# Patient Record
Sex: MALE | Race: WHITE | NOT HISPANIC OR LATINO | Employment: OTHER | URBAN - METROPOLITAN AREA
[De-identification: names, ages, dates, MRNs, and addresses within clinical notes are randomized per-mention and may not be internally consistent; named-entity substitution may affect disease eponyms.]

---

## 2017-01-25 ENCOUNTER — PATIENT OUTREACH (OUTPATIENT)
Dept: ADMINISTRATIVE | Facility: HOSPITAL | Age: 57
End: 2017-01-25

## 2017-01-25 NOTE — PROGRESS NOTES
A letter was mailed to the patient on today in regards to the information below.    The patient is due for a diabetes and hypertension follow up.The patient is also due for immunizations(pneumonia,tetanus, & flu), colonoscopy, and a diabetic eye and foot exam.

## 2017-03-01 ENCOUNTER — PATIENT OUTREACH (OUTPATIENT)
Dept: ADMINISTRATIVE | Facility: HOSPITAL | Age: 57
End: 2017-03-01

## 2017-03-01 NOTE — PROGRESS NOTES
Spoke w/ patient, he was informed of the information below. The patient was made aware that Dr. Topete has retired and that a new provider is needed. The patient verbalized understanding. The patient was scheduled an appointment with MORAIMA Steele on 03/02/17 @ 9 am. The patient will call back to establish care with a new physician once seen by the Nurse Practitioner.        The patient is due for a diabetes and hypertension follow up.The patient is also due for immunizations(pneumonia,tetanus, & flu), colonoscopy, and a diabetic eye and foot exam.

## 2017-03-02 ENCOUNTER — TELEPHONE (OUTPATIENT)
Dept: DIABETES | Facility: CLINIC | Age: 57
End: 2017-03-02

## 2017-03-02 ENCOUNTER — OFFICE VISIT (OUTPATIENT)
Dept: DIABETES | Facility: CLINIC | Age: 57
End: 2017-03-02
Payer: COMMERCIAL

## 2017-03-02 VITALS
SYSTOLIC BLOOD PRESSURE: 176 MMHG | DIASTOLIC BLOOD PRESSURE: 116 MMHG | HEART RATE: 89 BPM | WEIGHT: 218.94 LBS | BODY MASS INDEX: 31.34 KG/M2 | HEIGHT: 70 IN

## 2017-03-02 DIAGNOSIS — F32.89 DEPRESSIVE DISORDER, NOT ELSEWHERE CLASSIFIED: ICD-10-CM

## 2017-03-02 DIAGNOSIS — Z91.199 PATIENT NONADHERENCE: ICD-10-CM

## 2017-03-02 DIAGNOSIS — Z71.9 COUNSELING NOS(V65.40): ICD-10-CM

## 2017-03-02 DIAGNOSIS — E78.1 HYPERTRIGLYCERIDEMIA: ICD-10-CM

## 2017-03-02 DIAGNOSIS — E66.9 NON MORBID OBESITY, UNSPECIFIED OBESITY TYPE: ICD-10-CM

## 2017-03-02 DIAGNOSIS — I10 ESSENTIAL HYPERTENSION: ICD-10-CM

## 2017-03-02 PROCEDURE — 3046F HEMOGLOBIN A1C LEVEL >9.0%: CPT | Mod: S$GLB,,, | Performed by: NURSE PRACTITIONER

## 2017-03-02 PROCEDURE — 1160F RVW MEDS BY RX/DR IN RCRD: CPT | Mod: S$GLB,,, | Performed by: NURSE PRACTITIONER

## 2017-03-02 PROCEDURE — 2022F DILAT RTA XM EVC RTNOPTHY: CPT | Mod: S$GLB,,, | Performed by: NURSE PRACTITIONER

## 2017-03-02 PROCEDURE — 3077F SYST BP >= 140 MM HG: CPT | Mod: S$GLB,,, | Performed by: NURSE PRACTITIONER

## 2017-03-02 PROCEDURE — 99999 PR PBB SHADOW E&M-EST. PATIENT-LVL III: CPT | Mod: PBBFAC,,, | Performed by: NURSE PRACTITIONER

## 2017-03-02 PROCEDURE — 99215 OFFICE O/P EST HI 40 MIN: CPT | Mod: S$GLB,,, | Performed by: NURSE PRACTITIONER

## 2017-03-02 PROCEDURE — 3080F DIAST BP >= 90 MM HG: CPT | Mod: S$GLB,,, | Performed by: NURSE PRACTITIONER

## 2017-03-02 PROCEDURE — 3060F POS MICROALBUMINURIA REV: CPT | Mod: S$GLB,,, | Performed by: NURSE PRACTITIONER

## 2017-03-02 RX ORDER — METFORMIN HYDROCHLORIDE 500 MG/1
1000 TABLET, EXTENDED RELEASE ORAL 2 TIMES DAILY WITH MEALS
Qty: 360 TABLET | Refills: 0 | Status: SHIPPED | OUTPATIENT
Start: 2017-03-02 | End: 2017-09-20 | Stop reason: SDUPTHER

## 2017-03-02 RX ORDER — INSULIN GLARGINE 100 [IU]/ML
INJECTION, SOLUTION SUBCUTANEOUS
Qty: 15 ML | Refills: 11
Start: 2017-03-02 | End: 2017-03-22 | Stop reason: ALTCHOICE

## 2017-03-02 NOTE — PROGRESS NOTES
"CC: This 57 y.o. White male  is here for evaluation of  T2DM along with comorbidities indicated in the Visit Diagnosis section of this encounter.    HPI: Bessy Nunez was diagnosed with T2DM in the 1990s. Metformin started at the time of diagnosis. Admits he was in denial for years after diagnosis.     Last seen by ELY Beauchamp,  in 3/2015.   bp elevated.  He has been "in a rebellious stage." He's tired of taking injections and pills. Bydureon hurts. He's been off of all the medications for two months. He's also frustrated that DM control was poor despite taking multiple meds and eating well. Feels like he has to starve himself to lower his bgs. And timing of meds is inconsistent as well d/t being retired and has no routine.    The holidays are a rough time for him as both is parents passed away then. Awaiting for his pension and 401k to come in and he's stressed about that.       PMHX: CHASE, lipodystrophy, stroke 1995?    LAST DIABETES EDUCATION: 10/2013 with LEVI Thompson, CDE/RD for Bydureon training and MNT (Rec. 45-60g CHO per meal and 15g CHO per snack with low fat choices)    RECENT ILLNESSES/HOSPITALIZATIONS - -No.     HOSPITALIZED FOR DIABETES  -  No.    DIABETES MEDICATIONS: Lantus 35-36 units hs, Bydureon 2 mg weekly   Misses medication doses - Yes - as above    DM COMPLICATIONS: peripheral neuropathy    SIGNIFICANT DIABETES MED HISTORY: denies    SELF MONITORING BLOOD GLUCOSE: Checks blood glucose at home - none. Previously bgs were in the 200s.     HYPOGLYCEMIC EPISODES: denies     CURRENT DIET: 3 meals/day; eats snacks. Drinks water and unsweet tea. Eats out for lunch and for dinner.   Diet recall from yesterday: breakfast was 2 fried eggs and grits, water; lunch was shrimp spaghetti, potato salad, and 1 slice of bread, water;  supper was shrimp spaghetti, potato salad, peas.     CURRENT EXERCISE: none; used to walk for stopped 2 months       BP (!) 176/116  Pulse 89  Ht 5' 10" (1.778 m)  Wt 99.3 " kg (218 lb 14.7 oz)  BMI 31.41 kg/m2      ROS:   CONSTITUTIONAL: Appetite good, + fatigue  SKIN: No rash or pruritis   EYES: No visual disturbances  RESPIRATORY: No shortness of breath or cough  CARDIAC: No chest pain or palpitations  GI: No nausea, vomiting, or diarrhea  : No urinary frequency or dysuria; + nocturia 3-4x   MS: No arthralgias or mylagias   NEURO: + numbness to feet; no tremors.   PSYCH: + depression - declines counseling. Doesn't feel like it's worse off of Zoloft    OTHER: n/a      PHYSICAL EXAM:  GENERAL: Well developed, well nourished. No acute distress.   PSYCH: AAOx3, appropriate mood and affect, conversant, well-groomed. Judgement and insight good.   NEURO: Cranial nerves grossly intact. Speech clear, no tremor.   NECK: Trachea midline, no thyromegaly or lymphadenopathy.   CHEST: Respirations even and unlabored. CTA bilaterally.  CARDIOVASCULAR: Regular rate and rhythm. No bruits. No murmur. No edema.   ABDOMEN: Soft, non-tender, non-distended. Bowel sounds present.   MS: Gait steady. No clubbing.   SKIN: Normal skin turgor. Skin warm and dry. No areas of breakdown. No acanthosis nigricans.  FEET: Footware appropriate. No blisters or ulcers. Nails well trimmed. No interspace maceration.  Dorsalis pedis pulses present 2+ bilaterally.  Protective sensation to monofilament intact BLE. vibratory sensation decreased to BLE.         Hemoglobin A1C   Date Value Ref Range Status   04/07/2016 11.8 (H) 4.5 - 6.2 % Final   01/30/2015 10.4 (H) 4.5 - 6.2 % Final   05/02/2014 8.3 (H) 4.5 - 6.2 % Final           Chemistry        Component Value Date/Time     04/07/2016 0930    K 5.0 04/07/2016 0930     04/07/2016 0930    CO2 26 04/07/2016 0930    BUN 17 04/07/2016 0930    CREATININE 1.2 04/07/2016 0930     (H) 04/07/2016 0930        Component Value Date/Time    CALCIUM 9.4 04/07/2016 0930    ALKPHOS 88 04/07/2016 0930    AST 40 04/07/2016 0930    ALT 37 04/07/2016 0930    BILITOT 0.6  04/07/2016 0930          Lab Results   Component Value Date    LDLCALC 78.2 04/19/2016        Ref. Range 4/19/2016 09:19   Cholesterol Latest Ref Range: 120 - 199 mg/dL 190   HDL Latest Ref Range: 40 - 75 mg/dL 50   LDL Cholesterol Latest Ref Range: 63.0 - 159.0 mg/dL 78.2   Total Cholesterol/HDL Ratio Latest Ref Range: 2.0 - 5.0  3.8   Triglycerides Latest Ref Range: 30 - 150 mg/dL 309 (H)     Lab Results   Component Value Date    MICALBCREAT Unable to calculate 04/12/2016           STANDARDS of CARE:        ASA:               ACEI/ARB:         Statin:         Last eye exam:       ASSESSMENT and PLAN:    A1C goal: <7%  Fasting/premeal blood glucose goal:   2 hour post-meal blood glucose goal: less than 180      1. Type 2 diabetes, uncontrolled, with neuropathy  Discussed progression of DM disease, long term complications, and tx options. Reviewed A1c and BG goals.       1. Start metformin 1 tablet morning and 1 tablet evening for 1 week. Then increase to metformin 2 tablets morning and 2 tablets evening. Take with meals to avoid GI distress.   2. Restart Lantus at 42 units every night around 9-10 pm when you watch the news. (when you run out of Lantus, we will switch to Tresiba. Please call office for rx).   3. Stop Bydureon due to pain.   4. Start Trulicity 0.75 mg once weekly for 1 month. Then increase to 1.5 mg once weekly. Both prescriptions have been sent.   5. Test glucoses 2x/day - fasting and bedtime. Bring written glucose logs to every visit.   6. Establish care with a new PCP.   7. Very crucial to improve diet.   8. See Certified Diabetes Educator/Registered Dietician for Medical Nutrition Therapy.   9. Labs today. Will release results onto [facility].   10. Return to clinic in 6 weeks.   11. Resume walking - try at least 20 minutes a day then increase gradually      Hemoglobin A1c    Microalbumin/creatinine urine ratio    Lipid panel    Comprehensive metabolic panel    C-peptide    Ambulatory  Referral to Diabetes Education   2. Depressive disorder, not elsewhere classified  Establish care with PCP   3. Essential hypertension  Resume meds. Improve diet and exercise    4. Non morbid obesity, unspecified obesity type  Increases insulin resistance.      5. Hypertriglyceridemia  omega-3 fatty acids-vitamin E (FISH OIL) 1,000 mg Cap - otc - 1 cap bid    6. Patient nonadherence  Improve adherence. He is ready to make changes.        Orders Placed This Encounter   Procedures    Hemoglobin A1c     Standing Status:   Future     Standing Expiration Date:   5/1/2018    Microalbumin/creatinine urine ratio     Standing Status:   Future     Standing Expiration Date:   5/1/2018     Order Specific Question:   Specimen Source     Answer:   Urine    Lipid panel     Standing Status:   Future     Standing Expiration Date:   3/2/2018    Comprehensive metabolic panel     Standing Status:   Future     Standing Expiration Date:   5/1/2018    C-peptide     Standing Status:   Future     Standing Expiration Date:   5/1/2018    Ambulatory Referral to Diabetes Education     Referral Priority:   Routine     Referral Type:   Consultation     Referral Reason:   Specialty Services Required     Requested Specialty:   Endocrinology     Number of Visits Requested:   1        Return in about 6 weeks (around 4/13/2017).     Thank you very much for allowing me to participate in Bessy Nunez's care.       Spent 60 minutes with patient with >50% time spent in counseling, as noted in # 1-6.

## 2017-03-02 NOTE — MR AVS SNAPSHOT
[facility]  [address]  [address]  [place] [address]  Phone: [phone]  Fax: [phone]                  Bessy Nunez   3/2/2017 9:00 AM   Office Visit    Description:  Male : 1960   Provider:  Promise Steele NP   Department:  [facility]           Diagnoses this Visit        Comments    Type 2 diabetes, uncontrolled, with neuropathy    -  Primary     Depressive disorder, not elsewhere classified         Essential hypertension         Morbid obesity due to excess calories         Hypertriglyceridemia                To Do List           Future Appointments        Provider Department Dept Phone    3/6/2017 1:00 PM Keyona Peck RD [facility] [phone]    4/3/2017 10:10 AM LAB, [facility] [phone]    4/3/2017 10:30 AM SPECIMEN, [facility] [phone]    4/10/2017 8:30 AM Promise Steele NP [facility] [phone]      Goals (5 Years of Data)     None      Follow-Up and Disposition     Return in about 6 weeks (around 2017) for labs; appt with Leslie, coupon cards. .       These Medications        Disp Refills Start End    dulaglutide (TRULICITY) 0.75 mg/0.5 mL PnIj 4 Syringe 0 3/2/2017     Inject 0.75 mg into the skin every 7 days. Inject 0.75 mg once weekly x 4 weeks then increase to 1.5 mg once weekly - Subcutaneous    Pharmacy: [facility] [address] [place] [address] Ph #: [phone]       dulaglutide (TRULICITY) 1.5 mg/0.5 mL PnIj 4 Syringe 0 3/2/2017     Inject 1.5 mg into the skin every 7 days. - Subcutaneous    Pharmacy: [facility] [address] [place] [address] Ph #: [phone]       insulin glargine (LANTUS SOLOSTAR) 100 unit/mL (3 mL) InPn pen 15 mL 11 3/2/2017     Inject 42 units every night.    Pharmacy: [facility] [address] [place] [address] Ph #: [phone]        metformin (GLUCOPHAGE-XR) 500 MG 24 hr tablet 360 tablet 0 3/2/2017 3/2/2018    Take 2 tablets (1,000 mg total) by mouth 2 (two) times daily with meals. - Oral    Pharmacy: [facility] [address] [place] [address] #: [phone]         PURCHASE these Medications (No prescription required)        Start End    omega-3 fatty acids-vitamin E (FISH OIL) 1,000 mg Cap 3/2/2017 3/2/2018    Sig - Route: Take 1 capsule by mouth 2 (two) times daily. - Oral    Class: OTC      [facility]     [facility] On Call Nurse Care Line - 24/7 Assistance  Registered nurses in the [facility] Center provide clinical advisement, health education, appointment booking, and other advisory services.  Call for this free service at [phone].             Medications           Message regarding Medications     Verify the changes and/or additions to your medication regime listed below are the same as discussed with your clinician today.  If any of these changes or additions are incorrect, please notify your healthcare provider.        START taking these NEW medications        Refills    dulaglutide (TRULICITY) 0.75 mg/0.5 mL PnIj 0    Sig: Inject 0.75 mg into the skin every 7 days. Inject 0.75 mg once weekly x 4 weeks then increase to 1.5 mg once weekly    Class: Normal    Route: Subcutaneous    dulaglutide (TRULICITY) 1.5 mg/0.5 mL PnIj 0    Sig: Inject 1.5 mg into the skin every 7 days.    Class: Normal    Route: Subcutaneous    omega-3 fatty acids-vitamin E (FISH OIL) 1,000 mg Cap 0    Sig: Take 1 capsule by mouth 2 (two) times daily.    Class: OTC    Route: Oral    metformin (GLUCOPHAGE-XR) 500 MG 24 hr tablet 0    Sig: Take 2 tablets (1,000 mg total) by mouth 2 (two) times daily with meals.    Class: Normal    Route: Oral      CHANGE how you are taking these medications     Start Taking Instead of    insulin glargine (LANTUS SOLOSTAR) 100 unit/mL (3 mL) InPn pen LANTUS SOLOSTAR 100 unit/mL (3 mL) InPn pen    Dosage:   "Inject 42 units every night. Dosage:  52 units qhs    Reason for Change:  Reorder       STOP taking these medications     omega-3 acid ethyl esters (LOVAZA) 1 gram capsule Take 2 capsules (2 g total) by mouth 2 (two) times daily.    metformin (GLUCOPHAGE) 500 MG tablet Take 2 tablets (1,000 mg total) by mouth 2 (two) times daily with meals.    BYDUREON 2 mg SSRR INJECT 2MG INTO THE SKIN EVERY 7 DAYS.           Verify that the below list of medications is an accurate representation of the medications you are currently taking.  If none reported, the list may be blank. If incorrect, please contact your healthcare provider. Carry this list with you in case of emergency.           Current Medications     aspirin (ECOTRIN) 81 MG EC tablet Take 81 mg by mouth once daily.    atenolol (TENORMIN) 50 MG tablet Take 1 tablet (50 mg total) by mouth once daily.    atorvastatin (LIPITOR) 40 MG tablet Take 1 tablet (40 mg total) by mouth once daily.    diltiazem (DILACOR XR) 240 MG CDCR Take 1 capsule (240 mg total) by mouth once daily.    insulin glargine (LANTUS SOLOSTAR) 100 unit/mL (3 mL) InPn pen Inject 42 units every night.    insulin needles, disposable, (BD ULTRA-FINE ANA PEN NEEDLES) 32 x 5/32 " Ndle Inject twice daily    sertraline (ZOLOFT) 100 MG tablet TAKE ONE TABLET BY MOUTH ONCE DAILY    dulaglutide (TRULICITY) 0.75 mg/0.5 mL PnIj Inject 0.75 mg into the skin every 7 days. Inject 0.75 mg once weekly x 4 weeks then increase to 1.5 mg once weekly    dulaglutide (TRULICITY) 1.5 mg/0.5 mL PnIj Inject 1.5 mg into the skin every 7 days.    metformin (GLUCOPHAGE-XR) 500 MG 24 hr tablet Take 2 tablets (1,000 mg total) by mouth 2 (two) times daily with meals.    omega-3 fatty acids-vitamin E (FISH OIL) 1,000 mg Cap Take 1 capsule by mouth 2 (two) times daily.           Clinical Reference Information           Your Vitals Were     BP                   176/116           Blood Pressure          Most Recent Value    BP  (!)  " 176/116      Allergies as of 3/2/2017     No Known Allergies      Immunizations Administered on Date of Encounter - 3/2/2017     None      Orders Placed During Today's Visit      Normal Orders This Visit    Ambulatory Referral to Diabetes Education     Future Labs/Procedures Expected by Expires    C-peptide  3/2/2017 5/1/2018    Comprehensive metabolic panel  3/2/2017 5/1/2018    Hemoglobin A1c  3/2/2017 5/1/2018    Lipid panel  3/2/2017 3/2/2018    Microalbumin/creatinine urine ratio  3/2/2017 5/1/2018      Instructions    A1C goal: <7%  Fasting/premeal blood glucose goal:   2 hour post-meal blood glucose goal: less than 180        1. Start metformin 1 tablet morning and 1 tablet evening for 1 week. Then increase to metformin 2 tablets morning and 2 tablets evening. Take with meals to avoid GI distress.   2. Restart Lantus at 42 units every night around 9-10 pm when you watch the news. (when you run out of Lantus, we will switch to Tresiba. Please call office for rx).   3. Stop Bydureon due to pain.   4. Start Trulicity 0.75 mg once weekly for 1 month. Then increase to 1.5 mg once weekly. Both prescriptions have been sent.   5. Test glucoses 2x/day - fasting and bedtime. Bring written glucose logs to every visit.   6. Establish care with a new PCP.   7. Very crucial to improve diet.   8. See Certified Diabetes Educator/Registered Dietician for Medical Nutrition Therapy.   9. Labs today. Will release results onto [facility].   10. Return to clinic in 6 weeks.   11. Resume walking - try at least 20 minutes a day then increase gradually    Start fish oil over the counter 1000 mg twice daily.     Important to resume all meds, especially for blood pressure and depression.            Language Assistance Services     ATTENTION: Language assistance services are available, free of charge. Please call [phone].      ATENCIÓN: Si habla vesna, tiene a malin disposición servicios gratuitos de asistencia lingüística.  Ellis gee [phone].     EMILY Ý: N?u b?n nói Ti?ng Vi?t, có các d?ch v? h? tr? ngôn ng? mi?n phí dành cho b?n. G?i s? [phone].         [facility] complies with applicable Federal civil rights laws and does not discriminate on the basis of race, color, national origin, age, disability, or sex.

## 2017-03-02 NOTE — TELEPHONE ENCOUNTER
----- Message from Promise Steele NP sent at 3/2/2017 12:59 PM CST -----  Pt was seen today and i wanted his labs for today but it was scheduled prior to his next visit. Please ask pt to draw his labs at his earliest convenience.

## 2017-03-02 NOTE — PATIENT INSTRUCTIONS
A1C goal: <7%  Fasting/premeal blood glucose goal:   2 hour post-meal blood glucose goal: less than 180        1. Start metformin 1 tablet morning and 1 tablet evening for 1 week. Then increase to metformin 2 tablets morning and 2 tablets evening. Take with meals to avoid GI distress.   2. Restart Lantus at 42 units every night around 9-10 pm when you watch the news. (when you run out of Lantus, we will switch to Tresiba. Please call office for rx).   3. Stop Bydureon due to pain.   4. Start Trulicity 0.75 mg once weekly for 1 month. Then increase to 1.5 mg once weekly. Both prescriptions have been sent.   5. Test glucoses 2x/day - fasting and bedtime. Bring written glucose logs to every visit.   6. Establish care with a new PCP.   7. Very crucial to improve diet.   8. See Certified Diabetes Educator/Registered Dietician for Medical Nutrition Therapy.   9. Labs today. Will release results onto [facility].   10. Return to clinic in 6 weeks.   11. Resume walking - try at least 20 minutes a day then increase gradually    Start fish oil over the counter 1000 mg twice daily.     Important to resume all meds, especially for blood pressure and depression.

## 2017-03-22 ENCOUNTER — OFFICE VISIT (OUTPATIENT)
Dept: FAMILY MEDICINE | Facility: CLINIC | Age: 57
End: 2017-03-22
Payer: COMMERCIAL

## 2017-03-22 ENCOUNTER — HOSPITAL ENCOUNTER (OUTPATIENT)
Dept: RADIOLOGY | Facility: HOSPITAL | Age: 57
Discharge: HOME | End: 2017-03-22
Attending: INTERNAL MEDICINE
Payer: COMMERCIAL

## 2017-03-22 ENCOUNTER — TELEPHONE (OUTPATIENT)
Dept: ENDOCRINOLOGY | Facility: CLINIC | Age: 57
End: 2017-03-22

## 2017-03-22 ENCOUNTER — TELEPHONE (OUTPATIENT)
Dept: FAMILY MEDICINE | Facility: CLINIC | Age: 57
End: 2017-03-22

## 2017-03-22 VITALS
OXYGEN SATURATION: 96 % | HEART RATE: 64 BPM | TEMPERATURE: 98 F | WEIGHT: 212.5 LBS | SYSTOLIC BLOOD PRESSURE: 130 MMHG | BODY MASS INDEX: 30.42 KG/M2 | HEIGHT: 70 IN | DIASTOLIC BLOOD PRESSURE: 80 MMHG

## 2017-03-22 DIAGNOSIS — F32.89 DEPRESSIVE DISORDER, NOT ELSEWHERE CLASSIFIED: Chronic | ICD-10-CM

## 2017-03-22 DIAGNOSIS — E78.5 OTHER AND UNSPECIFIED HYPERLIPIDEMIA: Chronic | ICD-10-CM

## 2017-03-22 DIAGNOSIS — Z86.010 HX OF COLONIC POLYP: ICD-10-CM

## 2017-03-22 DIAGNOSIS — R20.0 LEFT FACIAL NUMBNESS: ICD-10-CM

## 2017-03-22 DIAGNOSIS — R42 DIZZINESS: ICD-10-CM

## 2017-03-22 DIAGNOSIS — I10 ESSENTIAL HYPERTENSION: Primary | Chronic | ICD-10-CM

## 2017-03-22 DIAGNOSIS — R06.83 SNORING: ICD-10-CM

## 2017-03-22 LAB — GLUCOSE SERPL-MCNC: 437 MG/DL (ref 70–110)

## 2017-03-22 PROCEDURE — 99215 OFFICE O/P EST HI 40 MIN: CPT | Mod: 25,S$GLB,, | Performed by: INTERNAL MEDICINE

## 2017-03-22 PROCEDURE — 4010F ACE/ARB THERAPY RXD/TAKEN: CPT | Mod: S$GLB,,, | Performed by: INTERNAL MEDICINE

## 2017-03-22 PROCEDURE — 70496 CT ANGIOGRAPHY HEAD: CPT | Mod: TC

## 2017-03-22 PROCEDURE — 70498 CT ANGIOGRAPHY NECK: CPT | Mod: 26,,, | Performed by: RADIOLOGY

## 2017-03-22 PROCEDURE — 2022F DILAT RTA XM EVC RTNOPTHY: CPT | Mod: S$GLB,,, | Performed by: INTERNAL MEDICINE

## 2017-03-22 PROCEDURE — 70496 CT ANGIOGRAPHY HEAD: CPT | Mod: 26,,, | Performed by: RADIOLOGY

## 2017-03-22 PROCEDURE — 3079F DIAST BP 80-89 MM HG: CPT | Mod: S$GLB,,, | Performed by: INTERNAL MEDICINE

## 2017-03-22 PROCEDURE — 1160F RVW MEDS BY RX/DR IN RCRD: CPT | Mod: S$GLB,,, | Performed by: INTERNAL MEDICINE

## 2017-03-22 PROCEDURE — 25500020 PHARM REV CODE 255: Performed by: INTERNAL MEDICINE

## 2017-03-22 PROCEDURE — 3075F SYST BP GE 130 - 139MM HG: CPT | Mod: S$GLB,,, | Performed by: INTERNAL MEDICINE

## 2017-03-22 PROCEDURE — 3046F HEMOGLOBIN A1C LEVEL >9.0%: CPT | Mod: S$GLB,,, | Performed by: INTERNAL MEDICINE

## 2017-03-22 PROCEDURE — 82948 REAGENT STRIP/BLOOD GLUCOSE: CPT | Mod: S$GLB,,, | Performed by: INTERNAL MEDICINE

## 2017-03-22 PROCEDURE — 99999 PR PBB SHADOW E&M-EST. PATIENT-LVL III: CPT | Mod: PBBFAC,,, | Performed by: INTERNAL MEDICINE

## 2017-03-22 RX ORDER — SERTRALINE HYDROCHLORIDE 100 MG/1
TABLET, FILM COATED ORAL
Qty: 135 TABLET | Refills: 3 | Status: SHIPPED | OUTPATIENT
Start: 2017-03-22 | End: 2018-02-19

## 2017-03-22 RX ORDER — ATORVASTATIN CALCIUM 40 MG/1
40 TABLET, FILM COATED ORAL DAILY
Qty: 90 TABLET | Refills: 3 | Status: SHIPPED | OUTPATIENT
Start: 2017-03-22 | End: 2018-03-27 | Stop reason: SDUPTHER

## 2017-03-22 RX ORDER — VALSARTAN 320 MG/1
320 TABLET ORAL DAILY
Qty: 90 TABLET | Refills: 3
Start: 2017-03-22 | End: 2018-04-19 | Stop reason: SDUPTHER

## 2017-03-22 RX ORDER — INSULIN DEGLUDEC 200 U/ML
42 INJECTION, SOLUTION SUBCUTANEOUS NIGHTLY
Qty: 3 SYRINGE | Refills: 1 | Status: SHIPPED | OUTPATIENT
Start: 2017-03-22 | End: 2017-06-07 | Stop reason: SDUPTHER

## 2017-03-22 RX ORDER — DILTIAZEM HYDROCHLORIDE 240 MG/1
240 CAPSULE, EXTENDED RELEASE ORAL DAILY
Qty: 90 CAPSULE | Refills: 3 | Status: SHIPPED | OUTPATIENT
Start: 2017-03-22 | End: 2018-03-27 | Stop reason: SDUPTHER

## 2017-03-22 RX ORDER — ATENOLOL 50 MG/1
50 TABLET ORAL DAILY
Qty: 90 TABLET | Refills: 3 | Status: SHIPPED | OUTPATIENT
Start: 2017-03-22 | End: 2018-04-06 | Stop reason: SDUPTHER

## 2017-03-22 RX ADMIN — IOHEXOL 100 ML: 350 INJECTION, SOLUTION INTRAVENOUS at 11:03

## 2017-03-22 NOTE — TELEPHONE ENCOUNTER
----- Message from [facility] GLADYS Leslie sent at 3/22/2017 10:35 AM CDT -----  Contact: Chemistry Lab Evon Barnard would like to speak to the nurse regarding about the pt's critical result. Please contact [phone]. Thanks!

## 2017-03-22 NOTE — PROGRESS NOTES
Assessment & Plan    Dizziness   Left facial numbness - could be due to uncontrolled DM but has risk factors for CVA.  Check noncontrast head CT but also check CTA neck for any lesion amenable to intervention.  -     CT Head Without Contrast; Future; Expected date: 3/22/17  -     CTA Neck; Future; Expected date: 3/22/17    Essential hypertension - stable today, no changes in regimen.  Refilled prescriptions to the pharmacy.  -     valsartan (DIOVAN) 320 MG tablet; Take 1 tablet (320 mg total) by mouth once daily.  Dispense: 90 tablet; Refill: 3  -     atenolol (TENORMIN) 50 MG tablet; Take 1 tablet (50 mg total) by mouth once daily.  Dispense: 90 tablet; Refill: 3  -     diltiaZEM (DILACOR XR) 240 MG CDCR; Take 1 capsule (240 mg total) by mouth once daily.  Dispense: 90 capsule; Refill: 3    Hx of colonic polyp    Type 2 diabetes, uncontrolled, with neuropathy -Followed by diabetes NP.  Pt under the impression he was supposed to wait to start Trulicity - instructed to start that now.  on Lantus.  Needs to start glucose checks at home.  -     POCT glucose    Depressive disorder, not elsewhere classified - not controlled; increase the zoloft.  -     sertraline (ZOLOFT) 100 MG tablet; 1 and half tablet daily  Dispense: 135 tablet; Refill: 3    Other and unspecified hyperlipidemia - nonfasting, will have upcoming lipid check.  On atorva 40.  Has room to increase if needed.  -     atorvastatin (LIPITOR) 40 MG tablet; Take 1 tablet (40 mg total) by mouth once daily.  Dispense: 90 tablet; Refill: 3    Snoring - he's losing insurance end of the month.  Submitted referral to sleep but unlikely to get that done before insurance lapses.  -     Ambulatory consult for Sleep Study            Medications Discontinued During This Encounter   Medication Reason    valsartan (DIOVAN) 320 MG tablet     atenolol (TENORMIN) 50 MG tablet Reorder    atorvastatin (LIPITOR) 40 MG tablet Reorder    diltiazem (DILACOR XR) 240 MG CDCR  Reorder    sertraline (ZOLOFT) 100 MG tablet Reorder       Follow-up: No Follow-up on file. ideally return for OV in 1-2 months; however, losing insurance.  Will ask him to contact us with update on zoloft efficacy.      =================================================================      Chief Complaint   Patient presents with    Establish Care    Medication Refill       HPI  Enrique is a 57 y.o. male, last appointment with this clinic was Visit date not found.    Former patient of Dr. Topete.  Diabetes, with peripheral neuropathy, recently seen by the nurse practitioner with endocrinology.  At the time he noted that he was not taking his medications.  Bydureon was painful.  Plan at the time, start metformin, restart Lantus, change to Tresiba.  Stop the Bydureon.  Start Trulicity.  Patient does not monitor his blood sugars.  He has not started the Trulicity.  He was confused and thought he was supposed to start that when he switched over from Lantus to Tresiba.  Encouraged him to start the Trulicity.  Hypertension, seen by cardiology.  Most recently last May.  Stress echo negative for ischemia but showed low normal EF.  On diltiazem and atenolol. also on valsartan.  This dropped off the medication list.    5/11/2016 TTE:   1 - Mildly depressed left ventricular systolic function (EF 45-50%).  Mild global hypokinesis at rest. 2 - Eccentric hypertrophy. 3 - Left ventricular diastolic dysfunction.  Hyperlipidemia, atorvastatin  5/11/2015 RUQ US: Hepatomegaly and fatty infiltration of the liver. Stable small hypoechoic lesion in the left hepatic lobe. Splenomegaly, stable. Cholelithiasis  6/12/2015 for biopsy showing advanced stage fibrosis with bridging fibrosis and scattered nodules.  2/21/2014 colonoscopy showing mild nonspecific acute and chronic inflammation of the ascending colon.  Hyperplastic polyp. Repeat 3 years.  Depression, Zoloft; thinks works OK but on discussion, depression uncontrolled. Denies SE.  PHQ9 score of 17 today.  Likely sleep apnea likely but never dx'd.  Is open to eval.  However, is losing insurance end of this month - he is unemployed and going to apply for tension.  His wife works but she is going to be changing jobs and anticipate he may not be able to get insurance again until December.      I also notes an episode of dizziness this weekend while standing up a sports event.  When asked if he could determine whether this was a sensation of lightheadedness versus dizziness/instability, could not really describe the difference.  His wife notes he seemed to walk around as if he were drunk.  He notes this morning on arising, left-sided facial numbness.  Not throughout the body but just the face.  Still feels an area of numbness on the mustache line.      Patient Care Team:  Edgar Nova MD as PCP - General (Internal Medicine)    Patient Active Problem List    Diagnosis Date Noted    Hx of colonic polyp 01/30/2015 2/21/2014 colonoscopy showing mild nonspecific acute and chronic inflammation of the ascending colon.  Hyperplastic polyp. Repeat 3 years.      Lipodystrophy 10/11/2013    Type 2 diabetes, uncontrolled, with neuropathy 10/11/2013    NAFLD (nonalcoholic fatty liver disease) 10/11/2013    Obesity 10/11/2013    ED (erectile dysfunction) 10/11/2013    Sleep apnea 10/11/2013    Depressive disorder, not elsewhere classified 11/09/2012    Other and unspecified hyperlipidemia 11/09/2012    Essential hypertension 11/09/2012       PAST MEDICAL HISTORY:  Past Medical History:   Diagnosis Date    Diabetes mellitus type II     Hyperlipidemia     Hypertension     Lipodystrophy     CHASE (nonalcoholic steatohepatitis)     Obesity     Stroke        PAST SURGICAL HISTORY:  Past Surgical History:   Procedure Laterality Date    SKIN CANCER EXCISION         SOCIAL HISTORY:  Social History     Social History    Marital status:      Spouse name: N/A    Number of children: N/A     "Years of education: N/A     Occupational History    unemployed      Social History Main Topics    Smoking status: Never Smoker    Smokeless tobacco: Never Used    Alcohol use No    Drug use: No    Sexual activity: Not on file     Other Topics Concern    Not on file     Social History Narrative       ALLERGIES AND MEDICATIONS: updated and reviewed.  Review of patient's allergies indicates:  No Known Allergies  Current Outpatient Prescriptions   Medication Sig Dispense Refill    aspirin (ECOTRIN) 81 MG EC tablet Take 81 mg by mouth once daily.      atenolol (TENORMIN) 50 MG tablet Take 1 tablet (50 mg total) by mouth once daily. 30 tablet 12    atorvastatin (LIPITOR) 40 MG tablet Take 1 tablet (40 mg total) by mouth once daily. 30 tablet 12    diltiazem (DILACOR XR) 240 MG CDCR Take 1 capsule (240 mg total) by mouth once daily. 30 capsule 12    dulaglutide (TRULICITY) 0.75 mg/0.5 mL PnIj Inject 0.75 mg into the skin every 7 days. Inject 0.75 mg once weekly x 4 weeks then increase to 1.5 mg once weekly 4 Syringe 0    dulaglutide (TRULICITY) 1.5 mg/0.5 mL PnIj Inject 1.5 mg into the skin every 7 days. 4 Syringe 0    insulin glargine (LANTUS SOLOSTAR) 100 unit/mL (3 mL) InPn pen Inject 42 units every night. 15 mL 11    insulin needles, disposable, (BD ULTRA-FINE ANA PEN NEEDLES) 32 x 5/32 " Ndle Inject twice daily 100 each 3    metformin (GLUCOPHAGE-XR) 500 MG 24 hr tablet Take 2 tablets (1,000 mg total) by mouth 2 (two) times daily with meals. 360 tablet 0    omega-3 fatty acids-vitamin E (FISH OIL) 1,000 mg Cap Take 1 capsule by mouth 2 (two) times daily.  0    sertraline (ZOLOFT) 100 MG tablet TAKE ONE TABLET BY MOUTH ONCE DAILY 30 tablet 5     No current facility-administered medications for this visit.        Review of Systems   Respiratory: Negative for shortness of breath.    Cardiovascular: Negative for chest pain and palpitations.   Neurological: Positive for tingling and sensory change. " "Negative for speech change and focal weakness.   Psychiatric/Behavioral: Positive for depression. Negative for suicidal ideas.       Physical Exam   Vitals:    03/22/17 0808   BP: 130/80   Pulse: 64   Temp: 97.8 °F (36.6 °C)   TempSrc: Oral   SpO2: 96%   Weight: 96.4 kg (212 lb 8.4 oz)   Height: 5' 10" (1.778 m)    Body mass index is 30.49 kg/(m^2).  Weight: 96.4 kg (212 lb 8.4 oz)   Height: 5' 10" (177.8 cm)     Physical Exam   Constitutional: He is oriented to person, place, and time. He appears well-developed and well-nourished. No distress.   Eyes: EOM are normal.   Cardiovascular: Normal rate, regular rhythm and normal heart sounds.    No murmur heard.  No carotid bruits   Pulmonary/Chest: Effort normal and breath sounds normal.   Musculoskeletal: Normal range of motion.   Neurological: He is alert and oriented to person, place, and time. Coordination normal.   Notes dysesthesia on light touch to the mustache line on the left side of his face.  The rest of the cranial sensory nerves are intact symmetrically.  Shoulder shrug 5/5 bilaterally.   5/5.  Finger-nose intact bilaterally.  Romberg negative.  Pronator drift negative.  Normal gait.  No nystagmus.   Skin: Skin is warm and dry.   Psychiatric: He has a normal mood and affect. His behavior is normal. Thought content normal.     POC Glucose testing 437  "

## 2017-03-22 NOTE — MR AVS SNAPSHOT
[facility]  [address]  [place] [address]  Phone: [phone]  Fax: [phone]                  Bessy Nunez   3/22/2017 8:20 AM   Office Visit    Description:  Male : 1960   Provider:  Edgar Nova MD   Department:  [facility]           Reason for Visit     Establish Care     Medication Refill           Diagnoses this Visit        Comments    Essential hypertension    -  Primary     Hx of colonic polyp         Type 2 diabetes, uncontrolled, with neuropathy         Depressive disorder, not elsewhere classified         Other and unspecified hyperlipidemia         Snoring         Dizziness         Left facial numbness                To Do List           Future Appointments        Provider Department Dept Phone    3/22/2017 9:30 AM [facility] CT1 LIMIT [address] [facility] [phone]    3/22/2017 10:00 AM [facility] CT1 LIMIT [address] [facility] [phone]    4/3/2017 10:10 AM LAB, [facility] [phone]    4/3/2017 10:30 AM SPECIMEN, [facility] [phone]    4/10/2017 8:30 AM Promise Steele NP [facility] [phone]      Goals (5 Years of Data)     None       These Medications        Disp Refills Start End    valsartan (DIOVAN) 320 MG tablet 90 tablet 3 3/22/2017 3/22/2018    Take 1 tablet (320 mg total) by mouth once daily. - Oral    Pharmacy: [place] [id number] Ph #: [phone]       atenolol (TENORMIN) 50 MG tablet 90 tablet 3 3/22/2017     Take 1 tablet (50 mg total) by mouth once daily. - Oral    Pharmacy: [place] [address] Ph #: [phone]       atorvastatin (LIPITOR) 40 MG tablet 90 tablet 3 3/22/2017     Take 1 tablet (40 mg total) by mouth once daily. - Oral    Pharmacy: [place] [address] Ph #: [phone]        diltiaZEM (DILACOR XR) 240 MG CDCR 90 capsule 3 3/22/2017     Take 1 capsule (240 mg total) by mouth once daily. - Oral    Pharmacy: [facility] [address] [place] [address] Ph #: [phone]       sertraline (ZOLOFT) 100 MG tablet 135 tablet 3 3/22/2017     1 and half tablet daily    Pharmacy: [place] [address] Ph #: [phone]         [facility]     [facility] On Call Nurse Care Line - 24/7 Assistance  Registered nurses in the [facility] On Call Center provide clinical advisement, health education, appointment booking, and other advisory services.  Call for this free service at [phone].             Medications           Message regarding Medications     Verify the changes and/or additions to your medication regime listed below are the same as discussed with your clinician today.  If any of these changes or additions are incorrect, please notify your healthcare provider.        START taking these NEW medications        Refills    valsartan (DIOVAN) 320 MG tablet 3    Sig: Take 1 tablet (320 mg total) by mouth once daily.    Class: No Print    Route: Oral      CHANGE how you are taking these medications     Start Taking Instead of    diltiaZEM (DILACOR XR) 240 MG CDCR diltiazem (DILACOR XR) 240 MG CDCR    Dosage:  Take 1 capsule (240 mg total) by mouth once daily. Dosage:  Take 1 capsule (240 mg total) by mouth once daily.    Reason for Change:  Reorder     sertraline (ZOLOFT) 100 MG tablet sertraline (ZOLOFT) 100 MG tablet    Dosage:  1 and half tablet daily Dosage:  TAKE ONE TABLET BY MOUTH ONCE DAILY    Reason for Change:  Reorder            Verify that the below list of medications is an accurate representation of the medications you are currently taking.  If none reported, the list may be blank. If incorrect, please contact your healthcare provider. Carry this list with you in case of emergency.           Current Medications     aspirin (ECOTRIN) 81 MG EC  "tablet Take 81 mg by mouth once daily.    atenolol (TENORMIN) 50 MG tablet Take 1 tablet (50 mg total) by mouth once daily.    atorvastatin (LIPITOR) 40 MG tablet Take 1 tablet (40 mg total) by mouth once daily.    diltiaZEM (DILACOR XR) 240 MG CDCR Take 1 capsule (240 mg total) by mouth once daily.    dulaglutide (TRULICITY) 0.75 mg/0.5 mL PnIj Inject 0.75 mg into the skin every 7 days. Inject 0.75 mg once weekly x 4 weeks then increase to 1.5 mg once weekly    dulaglutide (TRULICITY) 1.5 mg/0.5 mL PnIj Inject 1.5 mg into the skin every 7 days.    insulin glargine (LANTUS SOLOSTAR) 100 unit/mL (3 mL) InPn pen Inject 42 units every night.    insulin needles, disposable, (BD ULTRA-FINE ANA PEN NEEDLES) 32 x 5/32 " Ndle Inject twice daily    metformin (GLUCOPHAGE-XR) 500 MG 24 hr tablet Take 2 tablets (1,000 mg total) by mouth 2 (two) times daily with meals.    omega-3 fatty acids-vitamin E (FISH OIL) 1,000 mg Cap Take 1 capsule by mouth 2 (two) times daily.    sertraline (ZOLOFT) 100 MG tablet 1 and half tablet daily    valsartan (DIOVAN) 320 MG tablet Take 1 tablet (320 mg total) by mouth once daily.           Clinical Reference Information           Your Vitals Were     BP Pulse Temp Height Weight SpO2    130/80 64 97.8 °F (36.6 °C) (Oral) 5' 10" (1.778 m) 96.4 kg (212 lb 8.4 oz) 96%    BMI                30.49 kg/m2          Blood Pressure          Most Recent Value    BP  130/80      Allergies as of 3/22/2017     No Known Allergies      Immunizations Administered on Date of Encounter - 3/22/2017     None      Orders Placed During Today's Visit      Normal Orders This Visit    Ambulatory consult for Sleep Study     POCT glucose     Future Labs/Procedures Expected by Expires    CT Head Without Contrast  3/22/2017 3/22/2018    CTA Neck  3/22/2017 3/22/2018         3/22/2017  8:59 AM - Lois Grajeda MA      Component Results     Component Value Flag Ref Range Units Status    POC Glucose 437 (A) 70 - 110 mg/dL " Final    Comment:    Dr Nova advised            Language Assistance Services     ATTENTION: Language assistance services are available, free of charge. Please call [phone].      ATENCIÓN: Si habla vesna, tiene a malin disposición servicios gratuitos de asistencia lingüística. Llame al [phone].     CHÚ Ý: N?u b?n nói Ti?ng Vi?t, có các d?ch v? h? tr? ngôn ng? mi?n phí dành cho b?n. G?i s? [phone].         [facility] complies with applicable Federal civil rights laws and does not discriminate on the basis of race, color, national origin, age, disability, or sex.

## 2017-03-22 NOTE — TELEPHONE ENCOUNTER
Spoke to pt -   He has been taking metformin 2 grams/day and misses it 2x/week.   Takes Lantus 42 units at night but misses it 4 nights/week.  Not taking Trulicity.   Cancelled his appt with diab educator.   Hasn't been testing bgs.   His insurance will be ending at the end of the month and he will reschedule his visit with me before that. Advised him that he needs to test his bgs and bring logs to the visit. Will send in new rx for Tresiba since his Lantus has run out. He needs to start Trulicity once weekly. He verbalized understanding.

## 2017-03-24 ENCOUNTER — TELEPHONE (OUTPATIENT)
Dept: ENDOCRINOLOGY | Facility: CLINIC | Age: 57
End: 2017-03-24

## 2017-03-24 ENCOUNTER — TELEPHONE (OUTPATIENT)
Dept: FAMILY MEDICINE | Facility: CLINIC | Age: 57
End: 2017-03-24

## 2017-03-24 NOTE — TELEPHONE ENCOUNTER
Called pharmacy to see which medications were covered by patients insurance. Insurance did not cover Tanzeum but did cover Victoza. Informed pharmacy to fill prescription per providers request for Victoza 1.8mg once daily, 30 day supply with 2 refills. Pharmacy verbalized understanding.

## 2017-03-24 NOTE — TELEPHONE ENCOUNTER
Called pharmacy to verify if they received the diovan rx we send in on 3/22/17. They did not get rx so I verbally gave orders to the pharmacist to fill for pt. Called pt left a message informing him that his rx was called in and should be ready for  later this evening.

## 2017-03-24 NOTE — TELEPHONE ENCOUNTER
----- Message from Promise Steele NP sent at 3/24/2017  2:14 PM CDT -----  Contact: self  Please call pharmacy to see if Tanzeum 30 mg once weekly (disp 4 syringes, no refills) is covered.   If not, see if Victoza 1.8 mg once daily is covered (dispense 6 ml, no refills).     ----- Message -----     From: Jaja Kowalski LPN     Sent: 3/24/2017   1:27 PM       To: Promise Steele NP        ----- Message -----     From: Lenora Ellington     Sent: 3/24/2017  12:26 PM       To: Ivette Virgen Staff    Patient states his insurance will not cover -- dulaglutide (TRULICITY) 0.75 mg/0.5 mL  & dulaglutide (TRULICITY) 1.5 mg/0.5 mL     .     [phone]    LL

## 2017-03-24 NOTE — TELEPHONE ENCOUNTER
----- Message from Lenora Ellington sent at 3/24/2017 12:23 PM CDT -----  Contact: self  Patient states the pharmacy did not receive his prescription for his Diovan .      [phone]   ll

## 2017-03-24 NOTE — TELEPHONE ENCOUNTER
----- Message from Jaja Kowalski LPN sent at 3/24/2017  1:27 PM CDT -----  Contact: self      ----- Message -----     From: Lenora Ellington     Sent: 3/24/2017  12:26 PM       To: Ivette Virgen Staff    Patient states his insurance will not cover -- dulaglutide (TRULICITY) 0.75 mg/0.5 mL  & dulaglutide (TRULICITY) 1.5 mg/0.5 mL     .     [phone]    LL

## 2017-03-24 NOTE — TELEPHONE ENCOUNTER
----- Message from Promise Steele NP sent at 3/24/2017  2:25 PM CDT -----  Contact: self  Please let pt know to take Victoza instead of Trulicity. He can  a coupon card.   Take 0.6 mg x 1 week; if no nausea, 1.2 mg x 2 wks; if no nausea, then increase to 1.8 mg once daily.     Will send rx for needles as well.     Still needs to take his metformin bid and Lantus nightly.     ----- Message -----     From: Jaja Kowalski LPN     Sent: 3/24/2017   1:27 PM       To: Promise Steele NP        ----- Message -----     From: Lenora Ellington     Sent: 3/24/2017  12:26 PM       To: Ivette Virgen Staff    Patient states his insurance will not cover -- dulaglutide (TRULICITY) 0.75 mg/0.5 mL  & dulaglutide (TRULICITY) 1.5 mg/0.5 mL     .     [phone]    LL

## 2017-03-24 NOTE — TELEPHONE ENCOUNTER
Called patient regarding the change to his medication due to insurance coverage. Informed him of providers instructions and that voucher for Victoza and for Pen Needles would be at the  for him to . Patient verbalized understanding.

## 2017-03-29 ENCOUNTER — TELEPHONE (OUTPATIENT)
Dept: ENDOCRINOLOGY | Facility: CLINIC | Age: 57
End: 2017-03-29

## 2017-03-29 NOTE — TELEPHONE ENCOUNTER
Called patient to rescheduled appointment that was on Mon 4/10/17 at 8:30a per providers request. Patient stated that he wanted to go ahead and cancel the visit without rescheduling at this time.

## 2017-05-13 ENCOUNTER — HOSPITAL ENCOUNTER (INPATIENT)
Facility: HOSPITAL | Age: 57
LOS: 4 days | Discharge: HOME | DRG: 091 | End: 2017-05-18
Attending: EMERGENCY MEDICINE | Admitting: PSYCHIATRY & NEUROLOGY
Payer: MEDICAID

## 2017-05-13 DIAGNOSIS — E78.2 MIXED HYPERLIPIDEMIA: ICD-10-CM

## 2017-05-13 DIAGNOSIS — H53.2 DIPLOPIA: ICD-10-CM

## 2017-05-13 DIAGNOSIS — Z79.4 TYPE 2 DIABETES MELLITUS WITH HYPERGLYCEMIA, WITH LONG-TERM CURRENT USE OF INSULIN: ICD-10-CM

## 2017-05-13 DIAGNOSIS — E11.65 TYPE 2 DIABETES MELLITUS WITH HYPERGLYCEMIA, WITH LONG-TERM CURRENT USE OF INSULIN: ICD-10-CM

## 2017-05-13 DIAGNOSIS — H51.22 INTERNUCLEAR OPHTHALMOPLEGIA OF LEFT EYE: ICD-10-CM

## 2017-05-13 DIAGNOSIS — H53.002 AMBLYOPIA, LEFT: Primary | ICD-10-CM

## 2017-05-13 DIAGNOSIS — G37.9 DEMYELINATING CHANGES IN BRAIN: ICD-10-CM

## 2017-05-13 PROBLEM — H53.009 AMBLYOPIA: Status: ACTIVE | Noted: 2017-05-13

## 2017-05-13 PROBLEM — R27.0 ATAXIA: Status: ACTIVE | Noted: 2017-05-13

## 2017-05-13 LAB
ALBUMIN SERPL BCP-MCNC: 3.5 G/DL
ALP SERPL-CCNC: 85 U/L
ALT SERPL W/O P-5'-P-CCNC: 39 U/L
ANION GAP SERPL CALC-SCNC: 15 MMOL/L
APTT BLDCRRT: <21 SEC
AST SERPL-CCNC: 59 U/L
BASOPHILS # BLD AUTO: 0.02 K/UL
BASOPHILS NFR BLD: 0.3 %
BILIRUB SERPL-MCNC: 0.7 MG/DL
BUN SERPL-MCNC: 19 MG/DL
CALCIUM SERPL-MCNC: 9.4 MG/DL
CHLORIDE SERPL-SCNC: 101 MMOL/L
CHOLEST/HDLC SERPL: 8.8 {RATIO}
CO2 SERPL-SCNC: 17 MMOL/L
CREAT SERPL-MCNC: 1.2 MG/DL
DIFFERENTIAL METHOD: ABNORMAL
EOSINOPHIL # BLD AUTO: 0.1 K/UL
EOSINOPHIL NFR BLD: 1.3 %
ERYTHROCYTE [DISTWIDTH] IN BLOOD BY AUTOMATED COUNT: 13.8 %
EST. GFR  (AFRICAN AMERICAN): >60 ML/MIN/1.73 M^2
EST. GFR  (NON AFRICAN AMERICAN): >60 ML/MIN/1.73 M^2
ESTIMATED AVG GLUCOSE: 280 MG/DL
GLUCOSE SERPL-MCNC: 383 MG/DL
HBA1C MFR BLD HPLC: 11.4 %
HCT VFR BLD AUTO: 41.4 %
HDL/CHOLESTEROL RATIO: 11.3 %
HDLC SERPL-MCNC: 380 MG/DL
HDLC SERPL-MCNC: 43 MG/DL
HGB BLD-MCNC: 15.2 G/DL
INR PPP: 1
LDLC SERPL CALC-MCNC: ABNORMAL MG/DL
LYMPHOCYTES # BLD AUTO: 1.7 K/UL
LYMPHOCYTES NFR BLD: 26.4 %
MCH RBC QN AUTO: 30.1 PG
MCHC RBC AUTO-ENTMCNC: 36.7 %
MCV RBC AUTO: 82 FL
MITRAL VALVE MOBILITY: NORMAL
MONOCYTES # BLD AUTO: 0.4 K/UL
MONOCYTES NFR BLD: 5.7 %
NEUTROPHILS # BLD AUTO: 4.1 K/UL
NEUTROPHILS NFR BLD: 66 %
NONHDLC SERPL-MCNC: 337 MG/DL
PLATELET # BLD AUTO: 190 K/UL
PLATELET BLD QL SMEAR: ABNORMAL
PMV BLD AUTO: 9.7 FL
POCT GLUCOSE: 346 MG/DL (ref 70–110)
POCT GLUCOSE: 355 MG/DL (ref 70–110)
POCT GLUCOSE: 403 MG/DL (ref 70–110)
POTASSIUM SERPL-SCNC: 4.8 MMOL/L
PROT SERPL-MCNC: 7.8 G/DL
PROTHROMBIN TIME: 10.3 SEC
RBC # BLD AUTO: 5.05 M/UL
RETIRED EF AND QEF - SEE NOTES: 65 (ref 55–65)
SODIUM SERPL-SCNC: 133 MMOL/L
TRIGL SERPL-MCNC: 1246 MG/DL
TSH SERPL DL<=0.005 MIU/L-ACNC: 1.25 UIU/ML
WBC # BLD AUTO: 6.28 K/UL

## 2017-05-13 PROCEDURE — 83036 HEMOGLOBIN GLYCOSYLATED A1C: CPT

## 2017-05-13 PROCEDURE — 99285 EMERGENCY DEPT VISIT HI MDM: CPT | Mod: 25

## 2017-05-13 PROCEDURE — G0378 HOSPITAL OBSERVATION PER HR: HCPCS

## 2017-05-13 PROCEDURE — 99214 OFFICE O/P EST MOD 30 MIN: CPT | Mod: ,,, | Performed by: PSYCHIATRY & NEUROLOGY

## 2017-05-13 PROCEDURE — G8988 SELF CARE GOAL STATUS: HCPCS | Mod: CI

## 2017-05-13 PROCEDURE — 93010 ELECTROCARDIOGRAM REPORT: CPT | Mod: ,,, | Performed by: INTERNAL MEDICINE

## 2017-05-13 PROCEDURE — 97802 MEDICAL NUTRITION INDIV IN: CPT | Performed by: NUTRITIONIST

## 2017-05-13 PROCEDURE — 99220 PR INITIAL OBSERVATION CARE,LEVL III: CPT | Mod: ,,, | Performed by: PSYCHIATRY & NEUROLOGY

## 2017-05-13 PROCEDURE — 93306 TTE W/DOPPLER COMPLETE: CPT | Mod: 26,,, | Performed by: INTERNAL MEDICINE

## 2017-05-13 PROCEDURE — 93005 ELECTROCARDIOGRAM TRACING: CPT

## 2017-05-13 PROCEDURE — 86038 ANTINUCLEAR ANTIBODIES: CPT

## 2017-05-13 PROCEDURE — 97530 THERAPEUTIC ACTIVITIES: CPT

## 2017-05-13 PROCEDURE — 82164 ANGIOTENSIN I ENZYME TEST: CPT

## 2017-05-13 PROCEDURE — 11000001 HC ACUTE MED/SURG PRIVATE ROOM

## 2017-05-13 PROCEDURE — 85610 PROTHROMBIN TIME: CPT

## 2017-05-13 PROCEDURE — 82962 GLUCOSE BLOOD TEST: CPT

## 2017-05-13 PROCEDURE — 25000003 PHARM REV CODE 250: Performed by: INTERNAL MEDICINE

## 2017-05-13 PROCEDURE — A9585 GADOBUTROL INJECTION: HCPCS | Performed by: EMERGENCY MEDICINE

## 2017-05-13 PROCEDURE — 85025 COMPLETE CBC W/AUTO DIFF WBC: CPT

## 2017-05-13 PROCEDURE — G8979 MOBILITY GOAL STATUS: HCPCS | Mod: CI

## 2017-05-13 PROCEDURE — 63600175 PHARM REV CODE 636 W HCPCS: Performed by: INTERNAL MEDICINE

## 2017-05-13 PROCEDURE — 99285 EMERGENCY DEPT VISIT HI MDM: CPT | Mod: ,,, | Performed by: EMERGENCY MEDICINE

## 2017-05-13 PROCEDURE — 25500020 PHARM REV CODE 255: Performed by: EMERGENCY MEDICINE

## 2017-05-13 PROCEDURE — 36415 COLL VENOUS BLD VENIPUNCTURE: CPT

## 2017-05-13 PROCEDURE — 85730 THROMBOPLASTIN TIME PARTIAL: CPT

## 2017-05-13 PROCEDURE — 86255 FLUORESCENT ANTIBODY SCREEN: CPT

## 2017-05-13 PROCEDURE — G8987 SELF CARE CURRENT STATUS: HCPCS | Mod: CJ

## 2017-05-13 PROCEDURE — 93306 TTE W/DOPPLER COMPLETE: CPT

## 2017-05-13 PROCEDURE — 80061 LIPID PANEL: CPT

## 2017-05-13 PROCEDURE — 97161 PT EVAL LOW COMPLEX 20 MIN: CPT

## 2017-05-13 PROCEDURE — 84443 ASSAY THYROID STIM HORMONE: CPT

## 2017-05-13 PROCEDURE — 94761 N-INVAS EAR/PLS OXIMETRY MLT: CPT

## 2017-05-13 PROCEDURE — G8978 MOBILITY CURRENT STATUS: HCPCS | Mod: CJ

## 2017-05-13 PROCEDURE — 80053 COMPREHEN METABOLIC PANEL: CPT

## 2017-05-13 PROCEDURE — 97165 OT EVAL LOW COMPLEX 30 MIN: CPT

## 2017-05-13 PROCEDURE — 25000003 PHARM REV CODE 250: Performed by: NURSE PRACTITIONER

## 2017-05-13 PROCEDURE — 63600175 PHARM REV CODE 636 W HCPCS: Performed by: NURSE PRACTITIONER

## 2017-05-13 RX ORDER — ATORVASTATIN CALCIUM 20 MG/1
80 TABLET, FILM COATED ORAL DAILY
Status: DISCONTINUED | OUTPATIENT
Start: 2017-05-13 | End: 2017-05-18 | Stop reason: HOSPADM

## 2017-05-13 RX ORDER — SODIUM CHLORIDE 9 MG/ML
INJECTION, SOLUTION INTRAVENOUS CONTINUOUS
Status: DISCONTINUED | OUTPATIENT
Start: 2017-05-13 | End: 2017-05-18 | Stop reason: HOSPADM

## 2017-05-13 RX ORDER — VALSARTAN 160 MG/1
320 TABLET ORAL DAILY
Status: DISCONTINUED | OUTPATIENT
Start: 2017-05-13 | End: 2017-05-13

## 2017-05-13 RX ORDER — GLUCAGON 1 MG
1 KIT INJECTION
Status: DISCONTINUED | OUTPATIENT
Start: 2017-05-13 | End: 2017-05-18 | Stop reason: HOSPADM

## 2017-05-13 RX ORDER — INSULIN ASPART 100 [IU]/ML
1-10 INJECTION, SOLUTION INTRAVENOUS; SUBCUTANEOUS
Status: DISCONTINUED | OUTPATIENT
Start: 2017-05-13 | End: 2017-05-18 | Stop reason: HOSPADM

## 2017-05-13 RX ORDER — GADOBUTROL 604.72 MG/ML
10 INJECTION INTRAVENOUS
Status: COMPLETED | OUTPATIENT
Start: 2017-05-13 | End: 2017-05-13

## 2017-05-13 RX ORDER — ASPIRIN 81 MG/1
81 TABLET ORAL DAILY
Status: DISCONTINUED | OUTPATIENT
Start: 2017-05-13 | End: 2017-05-18 | Stop reason: HOSPADM

## 2017-05-13 RX ORDER — IBUPROFEN 200 MG
24 TABLET ORAL
Status: DISCONTINUED | OUTPATIENT
Start: 2017-05-13 | End: 2017-05-18 | Stop reason: HOSPADM

## 2017-05-13 RX ORDER — ATORVASTATIN CALCIUM 20 MG/1
40 TABLET, FILM COATED ORAL DAILY
Status: DISCONTINUED | OUTPATIENT
Start: 2017-05-13 | End: 2017-05-13

## 2017-05-13 RX ORDER — LABETALOL HYDROCHLORIDE 5 MG/ML
10 INJECTION, SOLUTION INTRAVENOUS EVERY 6 HOURS PRN
Status: DISCONTINUED | OUTPATIENT
Start: 2017-05-13 | End: 2017-05-13

## 2017-05-13 RX ORDER — ATENOLOL 50 MG/1
50 TABLET ORAL DAILY
Status: DISCONTINUED | OUTPATIENT
Start: 2017-05-13 | End: 2017-05-13

## 2017-05-13 RX ORDER — DILTIAZEM HYDROCHLORIDE 240 MG/1
240 CAPSULE, COATED, EXTENDED RELEASE ORAL DAILY
Status: DISCONTINUED | OUTPATIENT
Start: 2017-05-13 | End: 2017-05-18 | Stop reason: HOSPADM

## 2017-05-13 RX ORDER — SODIUM CHLORIDE 0.9 % (FLUSH) 0.9 %
3 SYRINGE (ML) INJECTION EVERY 8 HOURS
Status: DISCONTINUED | OUTPATIENT
Start: 2017-05-13 | End: 2017-05-18 | Stop reason: HOSPADM

## 2017-05-13 RX ORDER — INSULIN ASPART 100 [IU]/ML
7 INJECTION, SOLUTION INTRAVENOUS; SUBCUTANEOUS
Status: DISCONTINUED | OUTPATIENT
Start: 2017-05-13 | End: 2017-05-14

## 2017-05-13 RX ORDER — IBUPROFEN 200 MG
16 TABLET ORAL
Status: DISCONTINUED | OUTPATIENT
Start: 2017-05-13 | End: 2017-05-18 | Stop reason: HOSPADM

## 2017-05-13 RX ORDER — HEPARIN SODIUM 5000 [USP'U]/ML
5000 INJECTION, SOLUTION INTRAVENOUS; SUBCUTANEOUS EVERY 8 HOURS
Status: DISCONTINUED | OUTPATIENT
Start: 2017-05-13 | End: 2017-05-14

## 2017-05-13 RX ADMIN — DILTIAZEM HYDROCHLORIDE 240 MG: 240 CAPSULE, COATED, EXTENDED RELEASE ORAL at 09:05

## 2017-05-13 RX ADMIN — SERTRALINE HYDROCHLORIDE 150 MG: 50 TABLET ORAL at 09:05

## 2017-05-13 RX ADMIN — INSULIN ASPART 10 UNITS: 100 INJECTION, SOLUTION INTRAVENOUS; SUBCUTANEOUS at 05:05

## 2017-05-13 RX ADMIN — HEPARIN SODIUM 5000 UNITS: 5000 INJECTION, SOLUTION INTRAVENOUS; SUBCUTANEOUS at 05:05

## 2017-05-13 RX ADMIN — SODIUM CHLORIDE: 0.9 INJECTION, SOLUTION INTRAVENOUS at 05:05

## 2017-05-13 RX ADMIN — INSULIN ASPART 4 UNITS: 100 INJECTION, SOLUTION INTRAVENOUS; SUBCUTANEOUS at 10:05

## 2017-05-13 RX ADMIN — Medication 3 ML: at 06:05

## 2017-05-13 RX ADMIN — GADOBUTROL 10 ML: 604.72 INJECTION INTRAVENOUS at 05:05

## 2017-05-13 RX ADMIN — Medication 3 ML: at 10:05

## 2017-05-13 RX ADMIN — INSULIN ASPART 7 UNITS: 100 INJECTION, SOLUTION INTRAVENOUS; SUBCUTANEOUS at 05:05

## 2017-05-13 RX ADMIN — ATORVASTATIN CALCIUM 80 MG: 20 TABLET, FILM COATED ORAL at 09:05

## 2017-05-13 RX ADMIN — INSULIN ASPART 10 UNITS: 100 INJECTION, SOLUTION INTRAVENOUS; SUBCUTANEOUS at 12:05

## 2017-05-13 RX ADMIN — HEPARIN SODIUM 5000 UNITS: 5000 INJECTION, SOLUTION INTRAVENOUS; SUBCUTANEOUS at 06:05

## 2017-05-13 RX ADMIN — INSULIN DETEMIR 17 UNITS: 100 INJECTION, SOLUTION SUBCUTANEOUS at 06:05

## 2017-05-13 RX ADMIN — ASPIRIN 81 MG: 81 TABLET, COATED ORAL at 09:05

## 2017-05-13 RX ADMIN — DEXTROSE MONOHYDRATE: 50 INJECTION, SOLUTION INTRAVENOUS at 07:05

## 2017-05-13 NOTE — CONSULTS
"[facility]  Neurology  Consult Note    Patient Name: Bessy Nunez  MRN: [id number]  Admission Date: 5/13/2017  Hospital Length of Stay: 0 days  Code Status: Full Code   Attending Provider: Derrick Agudelo MD   Consulting Provider: Kenyatta Boone MD  Primary Care Physician: Edgar Nova MD  Principal Problem:Diplopia    Consults   Subjective:     Chief Complaint:  Demyelinating lesion     HPI:   56 yo M that I'm asked to see for possible MS.  Wife at bedside.  Came to ER today because of acute onset of diplopia Wednesday morning as well as ataxia "like i'm drunk."  Mri suggestive of demyelinating lesions (old and new).    Says he was told he had a stroke 16 years ago, but really "no one knew what was going on."  No symptoms since.    Since being in hospital, he has gotten confused and wandered out of obs unit looking for wife.  He is oriented currently.       Past Medical History:   Diagnosis Date    Diabetes mellitus type II     Hyperlipidemia     Hypertension     Lipodystrophy     CHASE (nonalcoholic steatohepatitis)     Obesity     Stroke        Past Surgical History:   Procedure Laterality Date    SKIN CANCER EXCISION         Review of patient's allergies indicates:  No Known Allergies        No current facility-administered medications on file prior to encounter.      Current Outpatient Prescriptions on File Prior to Encounter   Medication Sig    aspirin (ECOTRIN) 81 MG EC tablet Take 81 mg by mouth once daily.    atenolol (TENORMIN) 50 MG tablet Take 1 tablet (50 mg total) by mouth once daily.    atorvastatin (LIPITOR) 40 MG tablet Take 1 tablet (40 mg total) by mouth once daily.    diltiaZEM (DILACOR XR) 240 MG CDCR Take 1 capsule (240 mg total) by mouth once daily.    insulin degludec (TRESIBA FLEXTOUCH U-200) 200 unit/mL (3 mL) InPn Inject 42 Units into the skin every evening.    insulin needles, disposable, (BD ULTRA-FINE ANA PEN NEEDLES) 32 x 5/32 " Ndle Inject twice " "daily    liraglutide 0.6 mg/0.1 mL, 18 mg/3 mL, subq PNIJ (VICTOZA 3-TOMASZ) 0.6 mg/0.1 mL (18 mg/3 mL) PnIj Inject 1.8 mg into the skin once daily.    metformin (GLUCOPHAGE-XR) 500 MG 24 hr tablet Take 2 tablets (1,000 mg total) by mouth 2 (two) times daily with meals.    omega-3 fatty acids-vitamin E (FISH OIL) 1,000 mg Cap Take 1 capsule by mouth 2 (two) times daily.    pen needle, diabetic 32 gauge x 1/6" Ndle 1 Device by Misc.(Non-Drug; Combo Route) route once daily. Dispense novofine, or other brand as covered by insurance    sertraline (ZOLOFT) 100 MG tablet 1 and half tablet daily    valsartan (DIOVAN) 320 MG tablet Take 1 tablet (320 mg total) by mouth once daily.     Family History     Problem Relation (Age of Onset)    Cancer Father    Diabetes Maternal Aunt    Heart disease Mother    Heart failure Mother    Hypertension Mother        Social History Main Topics    Smoking status: Never Smoker    Smokeless tobacco: Never Used    Alcohol use No    Drug use: No    Sexual activity: Not on file     Review of Systems  Objective:     Vital Signs (Most Recent):  Temp: 99.1 °F (37.3 °C) (05/13/17 1200)  Pulse: 96 (05/13/17 1500)  Resp: 20 (05/13/17 1200)  BP: (!) 147/92 (05/13/17 1200)  SpO2: 96 % (05/13/17 1200) Vital Signs (24h Range):  Temp:  [98 °F (36.7 °C)-99.1 °F (37.3 °C)] 99.1 °F (37.3 °C)  Pulse:  [] 96  Resp:  [16-21] 20  SpO2:  [94 %-98 %] 96 %  BP: (132-153)/(79-92) 147/92     Weight: 93.6 kg (206 lb 5.6 oz)  Body mass index is 29.61 kg/(m^2).    Physical Exam      General appearance: Well nourished, well developed, no acute distress.         Cardiovascular:  pedal pulses 2, no edema or cyanosis, heart regular rate and rhythym, no carotid bruits.         -------------------------------------------------------------  Facial Expression: normal       Affect: full       Orientation to time & place:  Oriented to time, place, person and situation       Attention & concentration:  Normal " attention span and concentration       Memory:  Recent and remote memory intact  Language: Spontaneous, fluent; able to repeat and name objects        Fund of knowledge:  Aware of current events        Speech:  normal (not dysarthric)  -------------------------------------------------------  Cranial nerves: normal visual acuity, visual fields full, optic discs not well visualized due small pupils, pupils equal round and reactive, extraocular movements intact when separately tested, but clear JAYDEN,       facial sensation intact, face symmetrical, hearing intact to whisper, palate raises midline, shoulder shrug strength normal, tongue protrudes midline.        -------------------------------------------------------  Musculoskeletal  Muscle tone: all 4 extremities normal        Muscle Bulk: all 4 extremities normal        Muscle strength:  5/5 in all 4 extremities        No pronator drift  Sensation: Intact to light touch, pin prick, vibration in all extremities        Deep tendon Reflexes: 2+ bilateral biceps, triceps, patella and ankles        --------------------------------------------------------------  Cerebellar and Coordination  Gait:  normal, able to tandem without difficulty        Finger-nose: +dysmetria, mild, on right       Rapid Alternating Movements (pronation/supination):  R normal; L normal  --------------------------------------------------------------  MOVEMENT DISORDERS FOCUSED EXAM  Abnormality of movement (bradykinesia, hyperkinesia) present? No    Tremor present?   No   Posture:  normal  Postural stability:  no Rhomberg        Significant Labs:   BMP:   Recent Labs  Lab 05/13/17  0138   *   *   K 4.8      CO2 17*   BUN 19   CREATININE 1.2   CALCIUM 9.4     CBC:   Recent Labs  Lab 05/13/17  0138   WBC 6.28   HGB 15.2   HCT 41.4        Inflammatory Markers: No results for input(s): SEDRATE, CRP, PROCAL in the last 48 hours.    Significant Imaging: I have reviewed all  pertinent imaging results/findings within the past 24 hours.     MRI brain 5/13/17:  Multiple foci of FLAIR hyperintensity in the deep cerebral periventricular white matter in a configuration and distribution which can be seen in the setting of underlying demyelinating process such as multiple sclerosis. There are several punctate foci of restricted diffusion including the left aspect of the midbrain tectum which in light of the aforementioned white matter changes may reflect regions of recent demyelination. Superimposed small acute infarcts would be difficult to exclude, although are felt less likely given the overall constellation of findings. Additionally, there is a 0.7 cm enhancing T2/FLAIR identified in the anterior right thalamus concerning for focus of active demyelination.         Also old lesions in corpus collosum            Assessment and Plan:     Demyelinating changes in brain  MRI with active lesion in thalamus, query MS   -> admit to medicine, General neurology consulting   -> start solumedrol 500mg today, then 1 gm tomorrow, Monday and Tuesday AM   -> plan for LP with ms panel   -> labs today to r/o mimicks (ACE, REYMUNDO, nmo)    * Diplopia  Acute diplopia with new, enhancing lesion in right thalamus.  radiographically consistent with demyelinating disease, so will eval for MS.      Ataxia  Ataxia of gait since 5/10.     -> PT/OT evaluations   -> MS work-up as above        Thank you for your consult. I will follow-up with patient. Please contact us if you have any additional questions.    Kenyatta Boone MD  Neurology  [facility]

## 2017-05-13 NOTE — PLAN OF CARE
Problem: Patient Care Overview  Goal: Plan of Care Review  Outcome: Ongoing (interventions implemented as appropriate)  Recommendations     Recommendation/Intervention: 1) Continue Rx diet 2) Monitor labs as avaialble 3) RD to follow   Goals: 1) Patient to meet >85% of EEN/EPN  Nutrition Goal Status: new  Communication of RD Recs: other (comment) (reviewed w/ pateint & Spouse)

## 2017-05-13 NOTE — PROGRESS NOTES
Speech therapist at nurses station to see the patient and states that pt is not in room. Confirmed that pt was not in the room. Checked the unit restroom and patient was not in there either. Nursing then walked around the unit and the 1st floor. Pt's wife notified via cell phone and she stated pt was sleep in room when she left and she was in the garage charging phone. She was notified that pt was missing from unit and stated she would return to the room. Telemetry was notified to help track pt's box and they were able to locate box to [facility] 2nd floor. Arrived to [facility] with pt's wife and pt was not there. Security was then notified that pt was missing. After looking for the patient several times on the 1st and 2nd floor, pt was found in the parking garage. Pt still oriented but gait unsteady. Vascular neurology notified of pt being lost and found. Will continue to monitor closely.

## 2017-05-13 NOTE — PLAN OF CARE
Problem: Occupational Therapy Goal  Goal: Occupational Therapy Goal  OT evaluation completed.  Recommending outpt OT.   COLTON Cano  5/13/2017

## 2017-05-13 NOTE — IP AVS SNAPSHOT
[facility]  [address]  [facility] [address]  Phone: [phone]           Patient Discharge Instructions   Our goal is to set you up for success. This packet includes information on your condition, medications, and your home care.  It will help you care for yourself to prevent having to return to the hospital.     Please ask your nurse if you have any questions.      There are many details to remember when preparing to leave the hospital. Here is what you will need to do:    1. Take your medicine. If you are prescribed medications, review your Medication List on the following pages. You may have new medications to  at the pharmacy and others that you'll need to stop taking. Review the instructions for how and when to take your medications. Talk with your doctor or nurses if you are unsure of what to do.     2. Go to your follow-up appointments. Specific follow-up information is listed in the following pages. Your may be contacted by a nurse or clinical provider about future appointments. Be sure we have all of the phone numbers to reach you. Please contact your provider's office if you are unable to make an appointment.     3. Watch for warning signs. Your doctor or nurse will give you detailed warning signs to watch for and when to call for assistance. These instructions may also include educational information about your condition. If you experience any of warning signs to your health, call your doctor.           [facility]  Unless otherwise directed by your provider, please   contact [facility], our nurse care line   that is available for 24/7 assistance.     [phone] (toll-free)     Registered nurses in the [facility]   provide: appointment scheduling, clinical advisement, health education, and other advisory services.                  ** Verify the list of medication(s) below is accurate and up to date. Carry this with you in case of  emergency. If your medications have changed, please notify your healthcare provider.             Medication List      START taking these medications        Additional Info                      methylPREDNISolone 4 mg tablet   Commonly known as:  MEDROL DOSEPACK   Quantity:  1 Package   Refills:  0    Instructions:  Use as directed on dose-pack -Pharmacy please specify directions for patient     Begin Date    AM    Noon    PM    Bedtime       vitamin D 1000 units Tab   Refills:  0   Dose:  5000 Units    Last time this was given:  5,000 Units on 5/18/2017  8:03 AM   Instructions:  Take 5 tablets (5,000 Units total) by mouth once daily.     Begin Date    AM    Noon    PM    Bedtime         CHANGE how you take these medications        Additional Info                      sertraline 100 MG tablet   Commonly known as:  ZOLOFT   Quantity:  135 tablet   Refills:  3   What changed:    - how much to take  - how to take this  - when to take this  - additional instructions    Last time this was given:  150 mg on 5/18/2017  8:03 AM   Instructions:  1 and half tablet daily     Begin Date    AM    Noon    PM    Bedtime         CONTINUE taking these medications        Additional Info                      aspirin 81 MG EC tablet   Commonly known as:  ECOTRIN   Refills:  0   Dose:  81 mg    Last time this was given:  81 mg on 5/18/2017  8:03 AM   Instructions:  Take 81 mg by mouth once daily.     Begin Date    AM    Noon    PM    Bedtime       atenolol 50 MG tablet   Commonly known as:  TENORMIN   Quantity:  90 tablet   Refills:  3   Dose:  50 mg    Instructions:  Take 1 tablet (50 mg total) by mouth once daily.     Begin Date    AM    Noon    PM    Bedtime       atorvastatin 40 MG tablet   Commonly known as:  LIPITOR   Quantity:  90 tablet   Refills:  3   Dose:  40 mg    Last time this was given:  80 mg on 5/18/2017  8:03 AM   Instructions:  Take 1 tablet (40 mg total) by mouth once daily.     Begin Date    AM    Noon    PM     "Bedtime       diltiaZEM 240 MG Cdcr   Commonly known as:  DILACOR XR   Quantity:  90 capsule   Refills:  3   Dose:  240 mg    Instructions:  Take 1 capsule (240 mg total) by mouth once daily.     Begin Date    AM    Noon    PM    Bedtime       insulin degludec 200 unit/mL (3 mL) Inpn   Commonly known as:  TRESIBA FLEXTOUCH U-200   Quantity:  3 Syringe   Refills:  1   Dose:  42 Units    Instructions:  Inject 42 Units into the skin every evening.     Begin Date    AM    Noon    PM    Bedtime       liraglutide 0.6 mg/0.1 mL (18 mg/3 mL) subq PNIJ 0.6 mg/0.1 mL (18 mg/3 mL) Pnij   Commonly known as:  VICTOZA 3-TOMASZ   Quantity:  9 mL   Refills:  11   Dose:  1.8 mg    Instructions:  Inject 1.8 mg into the skin once daily.     Begin Date    AM    Noon    PM    Bedtime       metformin 500 MG 24 hr tablet   Commonly known as:  GLUCOPHAGE-XR   Quantity:  360 tablet   Refills:  0   Dose:  1000 mg    Instructions:  Take 2 tablets (1,000 mg total) by mouth 2 (two) times daily with meals.     Begin Date    AM    Noon    PM    Bedtime       omega-3 fatty acids-vitamin E 1,000 mg Cap   Commonly known as:  FISH OIL   Refills:  0   Dose:  1 capsule    Instructions:  Take 1 capsule by mouth 2 (two) times daily.     Begin Date    AM    Noon    PM    Bedtime       pen needle, diabetic 32 gauge x 5/32" Ndle   Commonly known as:  BD ULTRA-FINE ANA PEN NEEDLES   Quantity:  100 each   Refills:  3    Instructions:  Inject twice daily     Begin Date    AM    Noon    PM    Bedtime       valsartan 320 MG tablet   Commonly known as:  DIOVAN   Quantity:  90 tablet   Refills:  3   Dose:  320 mg    Last time this was given:  320 mg on 5/18/2017 11:09 AM   Instructions:  Take 1 tablet (320 mg total) by mouth once daily.     Begin Date    AM    Noon    PM    Bedtime            Where to Get Your Medications      These medications were sent to [facility] [address] [place] [address] [id number] [address]     Phone:  " [phone]     methylPREDNISolone 4 mg tablet         You can get these medications from any pharmacy     You don't need a prescription for these medications     vitamin D 1000 units Tab                  Please bring to all follow up appointments:    1. A copy of your discharge instructions.  2. All medicines you are currently taking in their original bottles.  3. Identification and insurance card.    Please arrive 15 minutes ahead of scheduled appointment time.    Please call 24 hours in advance if you must reschedule your appointment and/or time.        Your Scheduled Appointments     May 25, 2017  1:00 PM CDT   Hospital Follow Up with MERRY Littlejohn   [facility] ([facility])    [address]  [place] [id number] [phone]            Jun 30, 2017  2:00 PM CDT   Consult with Jez Espinoza MD   [facility] ([facility])    [address] [facility] [address] [phone]              Follow-up Information     Follow up with Shana Love MD In 1 week.    Specialty:  Neurology    Why:  L JAYDEN, demyelinating disease    Contact information:    [address] [facility] [address]  [phone]          Follow up with [facility] On 5/25/2017.    Specialty:  [facility]    Why:  1:00pm    Contact information:    [address] [facility] [id number] [phone]    Additional information:    [facility]      Referrals     Future Orders    Ambulatory Referral to Endocrinology     Ambulatory Referral to Neurology           Primary Diagnosis     Your primary diagnosis was:  Squint      Admission Information     Date & Time Provider Department CSN    5/13/2017 12:30 AM Elvira Hendricks MD [facility] [id number]      Care Providers     Provider Role Specialty Primary office phone    Elvira Hendricks MD Attending  "Provider Hospitalist [phone]    Kenyatta Boone MD Consulting Physician  Neurology [phone]    Elvira Hendricks MD Team Attending  Hospitalist [phone]      Your Vitals Were     BP Pulse Temp Resp Height Weight    181/92 (BP Location: Right arm, Patient Position: Sitting, BP Method: Automatic) 104 97.6 °F (36.4 °C) (Oral) 17 5' 10" (1.778 m) 93.6 kg (206 lb 5.6 oz)    SpO2 BMI             96% 29.61 kg/m2         Recent Lab Values        1/6/2012 11/9/2012 10/11/2013 1/24/2014 5/2/2014 1/30/2015 4/7/2016 5/13/2017      9:35 AM  9:32 AM  9:25 AM  8:01 AM  8:14 AM  9:44 AM  9:30 AM  1:38 AM    A1C 10.1 (H) 12.9 (H) 11.2 (H) 7.6 (H) 8.3 (H) 10.4 (H) 11.8 (H) 11.4 (H)    Comment for A1C at  1:38 AM on 5/13/2017:  According to ADA guidelines, hemoglobin A1C <7.0% represents  optimal control in non-pregnant diabetic patients.  Different  metrics may apply to specific populations.   Standards of Medical Care in Diabetes - 2016.  For the purpose of screening for the presence of diabetes:  <5.7%     Consistent with the absence of diabetes  5.7-6.4%  Consistent with increasing risk for diabetes   (prediabetes)  >or=6.5%  Consistent with diabetes  Currently no consensus exists for use of hemoglobin A1C  for diagnosis of diabetes for children.        Pending Labs     Order Current Status    Freeze and Hold,  Collected (05/14/17 1514)    Freeze and Hold,  Collected (05/14/17 1514)    ACE, CSF In process    RPR In process    VDRL, CSF In process    VDRL, CSF In process    CSF culture Preliminary result      Allergies as of 5/18/2017     No Known Allergies      Advance Directives     An advance directive is a document which, in the event you are no longer able to make decisions for yourself, tells your healthcare team what kind of treatment you do or do not want to receive, or who you would like to make those decisions for you.  If you do not currently have an advance directive, [facility] encourages you to create " one.  For more information call:  [phone]), [phone] ([phone]),  or log on to [email or web address].        Language Assistance Services     ATTENTION: Language assistance services are available, free of charge. Please call [phone].      ATENCIÓN: Si habla español, tiene a malin disposición servicios gratuitos de asistencia lingüística. Llame al [phone].     [facility] Ý: N?u b?n nói Ti?ng Vi?t, có các d?ch v? h? tr? ngôn ng? mi?n phí dành cho b?n. G?i s? [phone].        Diabetes Discharge Instructions                                    [facility] complies with applicable Federal civil rights laws and does not discriminate on the basis of race, color, national origin, age, disability, or sex.

## 2017-05-13 NOTE — PT/OT/SLP EVAL
"Occupational Therapy                                                                                                                             Evaluation/Education    Bessy Nunez   MRN: [id number]   Admitting Diagnosis: Diplopia    OT Date of Treatment: 17   OT Start Time: 626  OT Stop Time: 652  OT Total Time (min): 26 min    Billable Minutes:  Evaluation 16  Therapeutic Activity 10    Diagnosis: Diplopia     Past Medical History:   Diagnosis Date    Diabetes mellitus type II     Hyperlipidemia     Hypertension     Lipodystrophy     CHASE (nonalcoholic steatohepatitis)     Obesity     Stroke       Past Surgical History:   Procedure Laterality Date    SKIN CANCER EXCISION         Referring physician: MORAIMA Leon  Date referred to OT:   General Precautions: Standard, aspiration, fall, NPO  Orthopedic Precautions: N/A  Braces: N/A    Do you have any cultural, spiritual, Jain conflicts, given your current situation?: [place]     Patient History:  Prior level of function:   Patient resides in [place] with wife in 2 story home with bedroom on the first floor.  PTA patient independent with ADLs including driving.  Currently owns no DME.  Patient is right handed.  Retired:  .  Hobbies:  fishing.  Roles/ Responsibilities:  , father, all outside work at the house.     Subjective:  Communicated with nurse prior to session.  Patient:  "My balance was off when I walked.  I had a previous stroke 5 years ago.  I have diabetes, maxim blood pressure and high cholesterol.  I wouldn't say I have double vision, it's just mess up."  Pain Ratin/10   Pain Rating Post-Intervention: 0/10    Objective:  Patient found with: telemetry, peripheral IV, SCD  Wife present at end of the session.    Cognitive Exam:  Oriented to: Person, Place, Time and Situation  Follows Commands/attention: Follows one-step commands  Communication: clear/fluent  Memory:  Impaired STM  Safety awareness/insight to " disability: impaired    Visual/perceptual:  Diplopia, left hemianopsia, right gaze, left eye    Physical Exam:  Postural examination/scapula alignment: Rounded shoulder  Skin integrity: Visible skin intact  Edema: none noted    Sensation:   Intact    Upper Extremity Range of Motion:  Right Upper Extremity: WNL  Left Upper Extremity: WNL    Upper Extremity Strength:  Right Upper Extremity: WNL  Left Upper Extremity: WNL      Functional Mobility:  Bed Mobility:  Rolling/Turning to Left: Independent  Rolling/Turning Right: Independent  Scooting/Bridging: Independent  Supine to Sit: Independent  Sit to Supine: Independent    Transfers:  Sit <> Stand Assistance: Stand By Assistance  Sit <> Stand Assistive Device: No Assistive Device  Bed <> Chair Technique: Stand Pivot  Bed <> Chair Transfer Assistance: CGA (During turn)    Activities of Daily Living:  Feeding Level of Assistance:  (NPO)  UE Dressing Level of Assistance: Stand by assistance  LE Dressing Level of Assistance: Stand by assistance  Grooming Position: Standing  Grooming Level of Assistance: Stand by assistance  Toileting Where Assessed: Toilet  Toileting Level of Assistance: Stand by assistance     Additional Treatment:   Patient/ Family education provided for stroke warning signs, prevention guidelines and personal risk factors.  Patient/ Family verbalizing understanding via teach back method.  Patient education provided on role of OT and need for outpt OT upon discharge.  Patient education provided on fall prevention, visual scanning and driving evaluation. Patient verbalizing understanding via teach back method. Continued education, patient/ family training recommended.  Patient's functional status and disposition recommendation discussed with patient and wife.  Patient alert and oriented x 3; able to follow 4/4 one step commands.  Patient attentive and interactive throughout the session.  Able to identify 19/20 items on visual scanning task.  Demonstrated  "good spatial orientation on clock design.    White board updated in patient's room.  OT asked if there were any other questions; patient/ family had no further questions.    Postural Assessment Scale for Stroke: 26/36  1.  Sitting without support--3  2.  Standing with support--3  3.  Standing without support--3  4 and 5: Standing on nonparetic /paretic leg--2  6.  Supine to affected side lateral--3  7.  Supine to nonaffected side lateral--3  8.  Supine to sitting up on the edge of the table--3  9.  Sitting on the edge of the table to supine--3  10.  Sitting to standing up--3  11.  Standing up to sitting down--3  12.  Standing, picking up a pencil from the floor--2    AM-PAC 6 CLICK ADL  How much help from another person does this patient currently need?  1 = Unable, Total/Dependent Assistance  2 = A lot, Maximum/Moderate Assistance  3 = A little, Minimum/Contact Guard/Supervision  4 = None, Modified [place]/Independent    Putting on and taking off regular lower body clothing? : 3  Bathing (including washing, rinsing, drying)?: 3  Toileting, which includes using toilet, bedpan, or urinal? : 3  Putting on and taking off regular upper body clothing?: 3  Taking care of personal grooming such as brushing teeth?: 3  Eating meals?: 3  Total Score: 18    AM-PAC Raw Score CMS "G-Code Modifier Level of Impairment Assistance   6 % Total / Unable   7 - 9 CM 80 - 100% Maximal Assist   10 - 14 CL 60 - 80% Moderate Assist   15 - 19 CK 40 - 60% Moderate Assist   20 - 22 CJ 20 - 40% Minimal Assist   23 CI 1-20% SBA / CGA   24 CH 0% Independent/ Mod I       Patient left supine with all lines intact and call button in reach    Assessment:  Bessy Nunez is a 57 y.o. male with a medical diagnosis of Diplopia and presents with performance deficits of physical skills including impaired balance, mobility,  gross motor coordination, sensation (specifically viisual); demonstrating performance deficits of cognitive skills " including impaired problem solving, and memory all resulting in inability organizing occupational performance in a timely and safe manner.  These performance deficits have resulted in activity limitations including but not limited to:  transfers, ascending/ descending stairs, walking short and long distances, walking around obstacles, transitional movement patterns (kneeling, bending); upper body dressing, lower body dressing, brushing teeth, toileting, bathing, carrying objects, cutting grass and driving.   Patient's role as , father and independent caretaker for self has been affected. Patient will benefit from skilled OT services to maximize level of independence with self-care skills and functional mobility.  Will benefit from outpt OT.    Rehab identified problem list/impairments: Rehab identified problem list/impairments: impaired functional mobilty, decreased safety awareness, gait instability, decreased coordination, impaired balance, impaired self care skills, visual deficits    Rehab potential is good.    Activity tolerance: Good    Discharge recommendations: Discharge Facility/Level Of Care Needs: outpatient OT     Barriers to discharge: Barriers to Discharge: None    Equipment recommendations: shower chair     GOALS:   Occupational Therapy Goals        Problem: Occupational Therapy Goal    Goal Priority Disciplines Outcome Interventions   Occupational Therapy Goal     OT, PT/OT     Description:  Goals set 5/13 to be addressed for 7 days with expiration date, 5/20:  Patient will increase functional independence with ADLs by performing:  Patient will demonstrate stand pivot transfers with modified independence.   Not met  Patient will demonstrate grooming while standing with modified independence.   Not met  Patient will demonstrate upper body dressing with modified independence.   Not met  Patient will demonstrate lower body dressing with modified independence.   Not met  Patient will demonstrate  toileting with modified independence.   Not met  Patient will demonstrate bathing while seated EOB with modified independence.   Not met  Patient's family / caregiver will demonstrate independence and safety with assisting patient with self-care skills and functional mobility.     Not met  Patient and/or patient's family will verbalize understanding of stroke prevention guidelines, personal risk factors and stroke warning signs via teachback method.  Not met                     PLAN:  Patient to be seen 6 x/week to address the above listed problems via self-care/home management, therapeutic activities, therapeutic exercises, sensory integration  Plan of Care expires: 06/11/17  Plan of Care reviewed with: patient, spouse    OT G-codes  Functional Assessment Tool Used: FIM  Score: 5  Functional Limitation: Self care  Self Care Current Status (): CHET  Self Care Goal Status (): COLTON Farrar  05/13/2017

## 2017-05-13 NOTE — PROGRESS NOTES
MRI possible demyelinating disease vs possible stroke? Will consult General Neurology to see and Dr branham notified

## 2017-05-13 NOTE — ED PROVIDER NOTES
Encounter Date: 5/13/2017    SCRIBE #1 NOTE: I, Zita Soliz, am scribing for, and in the presence of,  Dr. Chavarria. I have scribed the following portions of the note - the Resident attestation.       History     Chief Complaint   Patient presents with    Weakness     Feels like balance is off. Also c/o vision changes. Onset yesterday morning     Review of patient's allergies indicates:  No Known Allergies  HPI Comments: 57-year-old gentleman with past medical history of hypertension, hyperlipidemia, type 2 diabetes and stroke who presents today with a one-day history of new onset amblyopia with left adduction impairment.  The patient states that he took a nap yesterday morning and awoke with double vision with images vertical to one another.  He reports associated dizziness which is relieved by closing one eye.  He reports that this is similar to his previous episode of stroke which was in the posterior aspect of his brain per his recollection.  He reports feeling otherwise well with no other associated symptoms.    The history is provided by the patient, medical records and the spouse.     Past Medical History:   Diagnosis Date    Diabetes mellitus type II     Hyperlipidemia     Hypertension     Lipodystrophy     CHASE (nonalcoholic steatohepatitis)     Obesity     Stroke      Past Surgical History:   Procedure Laterality Date    SKIN CANCER EXCISION       Family History   Problem Relation Age of Onset    Heart disease Mother     Hypertension Mother     Heart failure Mother     Cancer Father      lung    Diabetes Maternal Aunt      Social History   Substance Use Topics    Smoking status: Never Smoker    Smokeless tobacco: Never Used    Alcohol use No     Review of Systems   Constitutional: Negative for chills, fatigue and fever.   HENT: Negative for congestion and rhinorrhea.    Eyes: Positive for visual disturbance. Negative for pain, discharge and redness.   Respiratory: Negative for cough, chest  tightness, shortness of breath and wheezing.    Cardiovascular: Negative for chest pain, palpitations and leg swelling.   Gastrointestinal: Negative for abdominal distention, abdominal pain, blood in stool, constipation, diarrhea, nausea and vomiting.   Endocrine: Negative for polyuria.   Genitourinary: Negative for difficulty urinating, enuresis, hematuria and urgency.   Musculoskeletal: Negative for arthralgias and myalgias.   Neurological: Positive for dizziness. Negative for seizures, facial asymmetry, speech difficulty, weakness, light-headedness, numbness and headaches.   Hematological: Does not bruise/bleed easily.   Psychiatric/Behavioral: Negative for agitation, confusion and hallucinations. The patient is not nervous/anxious.        Physical Exam   Initial Vitals   BP Pulse Resp Temp SpO2   05/13/17 0024 05/13/17 0024 05/13/17 0024 05/13/17 0024 05/13/17 0024   136/84 102 16 98 °F (36.7 °C) 95 %     Physical Exam    Nursing note and vitals reviewed.  Constitutional: He appears well-developed and well-nourished. No distress.   HENT:   Head: Normocephalic and atraumatic.   Mouth/Throat: No oropharyngeal exudate.   Eyes: EOM are normal. Pupils are equal, round, and reactive to light. No scleral icterus.   Neck: Normal range of motion. Neck supple. No tracheal deviation present. No JVD present.   Cardiovascular: Normal rate, regular rhythm and normal heart sounds. Exam reveals no gallop.    No murmur heard.  Pulmonary/Chest: Breath sounds normal. He has no wheezes. He exhibits no tenderness.   Abdominal: Soft. Bowel sounds are normal. He exhibits no distension. There is tenderness.   Musculoskeletal: Normal range of motion. He exhibits no edema or tenderness.   Neurological: He is alert and oriented to person, place, and time. He has normal strength. A cranial nerve deficit (left medial gaze impairment) is present. No sensory deficit.   Skin: Skin is warm and dry. No erythema.   Psychiatric: He has a normal  mood and affect. His behavior is normal. Judgment and thought content normal.         ED Course   Procedures  Labs Reviewed   CBC W/ AUTO DIFFERENTIAL - Abnormal; Notable for the following:        Result Value    MCHC 36.7 (*)     All other components within normal limits   COMPREHENSIVE METABOLIC PANEL - Abnormal; Notable for the following:     Sodium 133 (*)     CO2 17 (*)     Glucose 383 (*)     AST 59 (*)     All other components within normal limits   LIPID PANEL - Abnormal; Notable for the following:     Cholesterol 380 (*)     Triglycerides 1246 (*)     HDL/Chol Ratio 11.3 (*)     Total Cholesterol/HDL Ratio 8.8 (*)     All other components within normal limits   POCT GLUCOSE - Abnormal; Notable for the following:     POCT Glucose 346 (*)     All other components within normal limits   PROTIME-INR   TSH   APTT   HEMOGLOBIN A1C   POCT GLUCOSE   POCT GLUCOSE MONITORING CONTINUOUS             Medical Decision Making:   History:   Old Medical Records: I decided to obtain old medical records.  Initial Assessment:   57-year-old gentleman with past medical history of stroke with risk factors of hypertension, hyperlipidemia and diabetes who presents with acute amblyopia  Differential Diagnosis:   Consider embolic stroke, tumor, abscess  Clinical Tests:   Lab Tests: Ordered  Medical Tests: Ordered  ED Management:  Plan:  CBC  CMP  PT/INR  TSH  Lipid panel  PTT  Glucose  Consults vascular neurology  Will defer imaging to vascular neurology  Will plan for admission  Other:   I have discussed this case with another health care provider.            Scribe Attestation:   Scribe #1: I performed the above scribed service and the documentation accurately describes the services I performed. I attest to the accuracy of the note.    Attending Attestation:   Physician Attestation Statement for Resident:  As the supervising MD   Physician Attestation Statement: I have personally seen and examined this patient.   I agree with the  above history. -:   As the supervising MD I agree with the above PE.    As the supervising MD I agree with the above treatment, course, plan, and disposition.   -: Pt presents with double vision for more than 24 hours. Left eye does not move medially. Will likely admit to stroke service and defer imaging to them.   I have reviewed and agree with the residents interpretation of the following: EKG, x-rays and lab data.  I have reviewed the following: old records at this facility, EKG reports and x-ray reports.          Physician Attestation for Scribe:  Physician Attestation Statement for Scribe #1: I, Dr. Chavarria, reviewed documentation, as scribed by Zita Soliz in my presence, and it is both accurate and complete.                 ED Course     Clinical Impression:   The primary encounter diagnosis was Amblyopia, left. Diagnoses of Mixed hyperlipidemia and Diplopia were also pertinent to this visit.    Disposition:   Disposition: Admitted  Condition: Stable       Anjum Green MD  Resident  05/13/17 6770

## 2017-05-13 NOTE — PLAN OF CARE
Hospital Medicine Consult Team - [facility] Med Team M    Requesting Physician for transfer to Hospital Medicine service /Team: Derrick Agudelo MD/Vascular Neurology    Code Status: Full Code     Accepting Physician: Emily Rainey     Date of Request for transfer: 05/13/2017     Reason for request for transfer to medicine service: Further inpatient hospitalization for suspected multiple sclerosis    Hospital Course: Patient is a 57 y.o. with past medical history of Type 2 diabetes with neuropathy, essential HTN, mixed hyperlipidemia, obesity was placed in observation by Vascular Neurology for ataxia and diplopia and concern for possible stroke. Patient had MRI of brain done and MRI is suspicious for demyelinating disease so General Neurology consulted and request made to transfer patient to medicine to take over the care of this patient by the primary service for continued inpatient treatment and management of suspected MS. Neurology recs noted in my note.     To Do List: Neuro recs:  -> Start Solumedrol 500mg today, then 1 gm tomorrow, Monday and Tuesday AM (I ordered)  -> plan for LP with ms panel  -> labs today to r/o mimicks (ACE, REYMUNDO, nmo)- I ordered labs just need to follow-up.  Need to closely watch blood sugars on high dose steroids as patient is diabetic. I am adjusting insulin and increased Levemir from 17 to 25 units nightly and adding Novolog 7 units with meals.    Anticipated Discharge Disposition: To be determined    Medicine will accept patient for transfer to a hospital medicine service but patient not to be transferred to medicine team until tomorrow, 05/14/17 at 7:00 am. I spoke with primary service who is requesting transfer and informed them they are responsible for patient's care until tomorrow morning. Patient to be assigned to a medicine team by nocturnist tonight and to be temporarily placed on IMT list for reassignment in the morning.       Thank you and please call if you have any  further questions.      DRU LEVINE MD  Attending Staff Physician   Hospital Medicine  Pager: [phone]  Spectralink: [phone]

## 2017-05-13 NOTE — CONSULTS
[facility]  Adult Nutrition  Consult Note    SUMMARY     Recommendations    Recommendation/Intervention: 1) Continue Rx diet  2) Monitor labs as avaialble 3) RD to follow   Goals: 1) Patient to meet >85% of EEN/EPN  Nutrition Goal Status: new  Communication of RD Recs: other (comment) (reviewed w/ pateint & Spouse)    Continuum of Care Plan      Reason for Assessment    Reason for Assessment: nurse/nurse practitioner consult  Diagnosis: other (see comments) (Diplopia)  Relevent Medical History: HLD, HTN,DM2,Stroke, obesity   Interdisciplinary Rounds: did not attend     General Information Comments: Pt states good PO intake pta, spoke w/ patient & spouse    Nutrition Discharge Planning: Home on PO diet    Nutrition Prescription Ordered    Current Diet Order: Cardiac  Nutrition Order Comments: Unable to assess at thist time      Evaluation of Received Nutrients/Fluid Intake       Nutrition Risk Screen     Nutrition Risk Screen: no indicators present    Nutrition/Diet History    Patient Reported Diet/Restrictions/Preferences: diabetic diet  Typical Food/Fluid Intake: Good  Food Preferences: Reviewed    Labs/Tests/Procedures/Meds    Pertinent Labs Reviewed: reviewed, pertinent  Pertinent Labs Comments: Na 133, K+4.8, Glucose 383, TG 1246  Pertinent Medications Reviewed: reviewed, pertinent  Pertinent Medications Comments: Statin, Heparin, Insulin    Physical Findings    Overall Physical Appearance: overweight     Oral/Mouth Cavity: tooth/teeth missing  Skin: intact, other (see comments) (Emre Score 21)    Anthropometrics    Height Method: Stated  Height (inches): 70.08 in  Weight Method: Bed Scale  Weight (kg): 93.6 kg  Ideal Body Weight (IBW), Male: 166.48 lb     % Ideal Body Weight, Male (lb): 123.95 lb     BMI (kg/m2): 29.54  BMI Grade: 25 - 29.9 - overweight    Anthropometrics (Special Considerations)      Estimated/Assessed Needs    Weight Used For Calorie Calculations: 93.6 kg (206 lb 5.6  oz) (20-25kcals/kg/BW)   Height (cm): 178 cm     Energy Need Method: Kcal/kg, other (see comments) (20-25kcals/kg/BW)     3618-3299 kcals  RMR (Herkimer-St. Jeor Equation): 1762.28        Weight Used For Protein Calculations: 93.6 kg (206 lb 5.6 oz)  Protein Requirements: 75-90 (0.8-1.0gmsProtein/kg/BW)    Fluid Need Method: other (see comments) (1ml/1kcal or MD Rx)    4866-8654 ml    Assessment and Plan    No Nutrition Diagnosis at this time.     Monitor and Evaluation    Food and Nutrient Intake: food and beverage intake  Food and Nutrient Adminstration: diet order     Physical Activity and Function: nutrition-related ADLs and IADLs  Anthropometric Measurements: weight, weight change  Biochemical Data, Medical Tests and Procedures: gastrointestinal profile, electrolyte and renal panel, glucose/endocrine profile, inflammatory profile  Nutrition-Focused Physical Findings: overall appearance      Nutrition Risk    Level of Risk: other (see comments) (follow up once weekly)    Nutrition Follow-Up    RD Follow-up?: Yes

## 2017-05-13 NOTE — CONSULTS
See admit H&P dated 05/13/2017 for full consult note.    Georgette Leon NP  Vascular Neurology

## 2017-05-13 NOTE — ASSESSMENT & PLAN NOTE
-Stroke risk factor.  Glucose uncontrolled.    -Detemir 17 u QHS, titrate prn  -Moderate correction dose SSI

## 2017-05-13 NOTE — H&P
[facility]  Vascular Neurology  Comprehensive Stroke Center  History & Physical    Subjective:     History of Present Illness:  Patient is a 57 y.o. male with significant past medical history of DM II, HTN, HLD, CHASE, and previous stroke presented to hospital complaining of acute onset diplopia that began yesterday morning.  The patient states he was in his usual state of health when he awoke from a nap with double vision.  He states as he attempted to ambulate, he would walk sideways and into walls secondary to his altered vision.  The patient denies HA, numbness, change in speech, CP, or N/V.  He endorses dizziness that is relieved by closing one eye.  The patient associates his current symptoms to symptoms he had with a previous stroke.  He has no residual deficits from that event.  ED workup revealed elevated glucose 383, elevated cholesterol 380, elevated triglycerides 1246.  Patient has diplopia, left hemianopsia, left partial gaze palsy (to the right).  MRI brain pending.  Patient will be admitted to Vascular Neurology for further evaluation.           Past Medical History:   Diagnosis Date    Diabetes mellitus type II     Hyperlipidemia     Hypertension     Lipodystrophy     CHASE (nonalcoholic steatohepatitis)     Obesity     Stroke      Past Surgical History:   Procedure Laterality Date    SKIN CANCER EXCISION       Family History   Problem Relation Age of Onset    Heart disease Mother     Hypertension Mother     Heart failure Mother     Cancer Father      lung    Diabetes Maternal Aunt      Social History   Substance Use Topics    Smoking status: Never Smoker    Smokeless tobacco: Never Used    Alcohol use No     Review of patient's allergies indicates:  No Known Allergies  Medications: I have reviewed the current medication administration record.    Current Outpatient Prescriptions:     aspirin (ECOTRIN) 81 MG EC tablet, Take 81 mg by mouth once daily., Disp: , Rfl:  "    atenolol (TENORMIN) 50 MG tablet, Take 1 tablet (50 mg total) by mouth once daily., Disp: 90 tablet, Rfl: 3    atorvastatin (LIPITOR) 40 MG tablet, Take 1 tablet (40 mg total) by mouth once daily., Disp: 90 tablet, Rfl: 3    diltiaZEM (DILACOR XR) 240 MG CDCR, Take 1 capsule (240 mg total) by mouth once daily., Disp: 90 capsule, Rfl: 3    insulin degludec (TRESIBA FLEXTOUCH U-200) 200 unit/mL (3 mL) InPn, Inject 42 Units into the skin every evening., Disp: 3 Syringe, Rfl: 1    insulin needles, disposable, (BD ULTRA-FINE ANA PEN NEEDLES) 32 x 5/32 " Ndle, Inject twice daily, Disp: 100 each, Rfl: 3    liraglutide 0.6 mg/0.1 mL, 18 mg/3 mL, subq PNIJ (VICTOZA 3-TOMASZ) 0.6 mg/0.1 mL (18 mg/3 mL) PnIj, Inject 1.8 mg into the skin once daily., Disp: 9 mL, Rfl: 11    metformin (GLUCOPHAGE-XR) 500 MG 24 hr tablet, Take 2 tablets (1,000 mg total) by mouth 2 (two) times daily with meals., Disp: 360 tablet, Rfl: 0    omega-3 fatty acids-vitamin E (FISH OIL) 1,000 mg Cap, Take 1 capsule by mouth 2 (two) times daily., Disp: , Rfl: 0    pen needle, diabetic 32 gauge x 1/6" Ndle, 1 Device by Misc.(Non-Drug; Combo Route) route once daily. Dispense novofine, or other brand as covered by insurance, Disp: 303 each, Rfl: 3    sertraline (ZOLOFT) 100 MG tablet, 1 and half tablet daily, Disp: 135 tablet, Rfl: 3    valsartan (DIOVAN) 320 MG tablet, Take 1 tablet (320 mg total) by mouth once daily., Disp: 90 tablet, Rfl: 3        Review of Systems   Constitutional: Negative for chills and fever.   HENT: Negative for drooling and trouble swallowing.    Eyes: Positive for visual disturbance. Negative for photophobia, pain, discharge and redness.   Respiratory: Negative for cough and shortness of breath.    Cardiovascular: Negative for chest pain, palpitations and leg swelling.   Gastrointestinal: Negative for abdominal pain, nausea and vomiting.   Endocrine: Negative for cold intolerance and heat intolerance.   Genitourinary: " Negative for dysuria and hematuria.   Musculoskeletal: Positive for gait problem.   Skin: Negative for rash.   Allergic/Immunologic: Positive for food allergies. Negative for environmental allergies.   Neurological: Positive for dizziness. Negative for seizures, facial asymmetry, speech difficulty, weakness, light-headedness, numbness and headaches.   Hematological: Negative for adenopathy. Does not bruise/bleed easily.   Psychiatric/Behavioral: Negative for confusion.     Objective:     Vital Signs (Most Recent):  Temp: 98 °F (36.7 °C) (05/13/17 0024)  Pulse: 88 (05/13/17 0231)  Resp: 20 (05/13/17 0231)  BP: (!) 150/80 (05/13/17 0231)  SpO2: 97 % (05/13/17 0231)    Vital Signs Range (Last 24H):  Temp:  [98 °F (36.7 °C)]   Pulse:  []   Resp:  [16-20]   BP: (132-153)/(79-85)   SpO2:  [94 %-98 %]     Physical Exam   Constitutional: He is oriented to person, place, and time. He appears well-developed and well-nourished. No distress.   HENT:   Head: Normocephalic and atraumatic.   Right Ear: External ear normal.   Left Ear: External ear normal.   Nose: Nose normal.   Mouth/Throat: Oropharynx is clear and moist. No oropharyngeal exudate.   Eyes: Conjunctivae are normal. Pupils are equal, round, and reactive to light. Right eye exhibits no discharge. Left eye exhibits no discharge. No scleral icterus.   Neck: Normal range of motion. Neck supple. No thyromegaly present.   Cardiovascular: Normal rate, regular rhythm and intact distal pulses.    Murmur heard.  Pulmonary/Chest: Effort normal and breath sounds normal. No respiratory distress. He has no wheezes.   Abdominal: Soft. Bowel sounds are normal. He exhibits no distension. There is no tenderness.   Musculoskeletal: He exhibits no edema.   Lymphadenopathy:     He has no cervical adenopathy.   Neurological: He is alert and oriented to person, place, and time. GCS eye subscore is 4 - spontaneous. GCS verbal subscore is 5 - oriented. GCS motor subscore is 6 - obeys  commands.   Skin: Skin is warm and dry. He is not diaphoretic.   Psychiatric: He has a normal mood and affect.   Nursing note and vitals reviewed.      Neurological Exam:   LOC: alert and follows requests  Language: No aphasia  Speech: No dysarthria  Orientation: Person, Place, Time  Visual Fields (CN II): Hemianopsia  left  EOM (CN III, IV, VI): Palsy: CN III: left upper  Pupils (CN III, IV, VI): PERRL  Facial Sensation (CN V): Symmetric  Facial Movement (CN VII): symmetric facial expression  Tongue (CN XII): to midline  Motor*: Arm Left:  Normal (5/5), Leg Left:   Normal (5/5), Arm Right:   Normal (5/5), Leg Right:   Normal (5/5)  Cerebellar*: Finger/Nose Abnormal - right upper  Sensation: intact to light touch, temperature and vibration    NIH Stroke Scale:  Interval: baseline (upon arrival/admit)  Level of Consciousness: 0 - alert  LOC Questions: 0 - answers both correctly  LOC Commands: 0 - performs both correctly  Best Gaze: 1 - partial gaze palsy (left eye, right gaze)  Visual: 1 - partial hemianopia (left eye)  Facial Palsy: 0 - normal  Motor Left Arm: 0 - no drift  Motor Right Arm: 0 - no drift  Motor Left Le - no drift  Motor Right Le - no drift  Limb Ataxia: 1 - present in one limb (RUE finger overshoot)  Sensory: 0 - normal  Best Language: 0 - no aphasia  Dysarthria: 0 - normal articulation  Extinction and Inattention: 0 - no neglect  NIH Stroke Scale Total: 3  Winston Coma Scale:  Best Eye Response: 4 - spontaneous  Best Motor Response: 6 - obeys commands  Best Verbal Response: 5 - oriented  Winston Coma Scale Total: 15  Modified Lyons Scale:   Timeline: Prior to symptoms onset  Modified Lyons Score: 1 - no significant disability    [id number] Scale:   Age: 0 - < 65 years old  CHF History: 1 - yes  HTN History: 1 - yes  Stroke/TIA/Thromboembolism History: 2 - yes  Vascular Disease History: 0 - no  Diabetes Mellitus in History: 1 - yes  Female: 0 - no  [id number] Scale Total:  5      Laboratory:  CMP:   Recent Labs  Lab 05/13/17 0138   CALCIUM 9.4   ALBUMIN 3.5   PROT 7.8   *   K 4.8   CO2 17*      BUN 19   CREATININE 1.2   ALKPHOS 85   ALT 39   AST 59*   BILITOT 0.7     CBC:   Recent Labs  Lab 05/13/17 0138   WBC 6.28   RBC 5.05   HGB 15.2   HCT 41.4      MCV 82   MCH 30.1   MCHC 36.7*     Lipid Panel:   Recent Labs  Lab 05/13/17 0138   CHOL 380*   LDLCALC Invalid, Trig>400.0   HDL 43   TRIG 1246*     Coagulation:   Recent Labs  Lab 05/13/17 0138   INR 1.0   APTT <21.0     Hgb A1C: No results for input(s): HGBA1C in the last 168 hours.  TSH:   Recent Labs  Lab 05/13/17 0138   TSH 1.248       Diagnostic Results:  Brain Imaging: pending    Cerebrovascular Imaging: pending    Cervical Vascular Imaging: pending    Cardiac Evaluation: pending  EKG/Telemetry: Sinus rhythm    Assessment/Plan:     * Diplopia  57 y.o. male with significant past medical history of DM II, HTN, HLD, CHASE, and previous stroke presented to hospital complaining of acute onset diplopia that began yesterday morning.  MRI pending.  Antithrombotics for secondary stroke prevention: Antiplatelets:  Aspirin: 81 mg oral now and daily  Statins for secondary stroke prevention and hyperlipidemia, if present: Atorvastatin- 80 mg oral daily  Aggressive risk factor modification: Hypertension, Diabetes, High Cholesterol and Obesity Rehab Efforts: Physical Therapy, Occupational Therapy and Speech and Language Pathology Diagnostics: Ordered/Pending HgbA1C to assess blood glucose levels, Lipid Profile to assess cholesterol levels, MRA head to assess vasculature, MRA neck/arch to assess vasculature, MRI head without contrast to assess brain parenchyma, Trans-thoracic cardiac echo to assess cardiac function/status, TSH to assess thyroid function  VTE Prophylaxis: Heparin 5000 units SQ every 8 hours  BP Parameters: SBP <180  Nursing Orders: Neuro checks- Q 4 hours, Complete SHIRLEY unless evaluated by speech, Seizure  precautions, Out of bed BID, Avoid Toro catheter, Pneumatic compression device, Stroke Education, Blood glucose target 100-130, Up with assistance    Type 2 diabetes, uncontrolled, with neuropathy  -Stroke risk factor.  Glucose uncontrolled.    -Detemir 17 u QHS, titrate prn  -Moderate correction dose SSI    Mixed hyperlipidemia  -Stroke risk factor.  Plan as above.    Essential hypertension  -Stroke risk factor.  Plan as above.      Obesity  -Stroke risk factor.  Encourage proper diet, regular exercise.      Thrombolysis Candidate? No  1. Contraindications: Outside of treament window    Interventional Revascularization Candidate?  No; No large vessel occlusion    Research Candidate? No    Georgette Leon NP  [facility]  Department of Vascular Neurology   [facility]

## 2017-05-13 NOTE — PLAN OF CARE
Problem: Patient Care Overview  Goal: Plan of Care Review  Outcome: Ongoing (interventions implemented as appropriate)  Plan of care reviewed with patient. No distress noted at this time. Fall precautions maintained with bed in lowest position, wheels locked, and bed alarm on. Cardiac monitoring maintained with no signs of ectopy noted. Blood glucose monitoring in place with insulin given. Will continue to monitor closely.

## 2017-05-13 NOTE — PT/OT/SLP EVAL
"Physical Therapy  Evaluation    Bessy Nunez   MRN: [id number]   Admitting Diagnosis: Diplopia    PT Received On: 17  PT Start Time: 912     PT Stop Time: 928    PT Total Time (min): 16 min       Billable Minutes:  Evaluation 15    Diagnosis: Diplopia    Past Medical History:   Diagnosis Date    Diabetes mellitus type II     Hyperlipidemia     Hypertension     Lipodystrophy     CHASE (nonalcoholic steatohepatitis)     Obesity     Stroke       Past Surgical History:   Procedure Laterality Date    SKIN CANCER EXCISION       Referring physician: Magnus  Date referred to PT: 2017    General Precautions: Standard, aspiration, fall    Do you have any cultural, spiritual, Gnosticism conflicts, given your current situation?: Faith    Patient History:  Lives With: spouse  Living Arrangements: house  Transportation Available: family or friend will provide  Living Environment Comment: Pt lives in 2SH with 0STE with spouse in [place], [place]. PTA pt was (I) with mobility and ADLs; driving, retired from computer training. Pt is responsible for all yard work/outdoor management. Pt enjoys fishing. Pt reports x2 falls in the home previously.  Equipment Currently Used at Home: none  DME owned (not currently used): none    Previous Level of Function:  Ambulation Skills: independent  Transfer Skills: independent  ADL Skills: independent  Work/Leisure Activity: independent    Subjective:  Communicated with RN (Yareli) prior to session.    Chief Complaint: "I wonder what they will send me to do next."  Patient goals: "I hope they let me go home soon."    Pain Ratin/10   Pain Rating Post-Intervention: 0/10    Objective:   Patient found with: telemetry     Cognitive Exam:  Oriented to: Person, Place and Situation    Follows Commands/attention: Follows multistep  commands  Communication: clear/fluent  Safety awareness/insight to disability: intact    Physical Exam:  Postural examination/scapula alignment: Rounded " shoulder, Head forward and Posterior pelvic tilt    Skin integrity: Visible skin intact, Thin and Dry  Edema: None noted    Sensation:   Intact ; upon evaluation and per pt report, no changes in sensation to B LE.    Lower Extremity Range of Motion:  Right Lower Extremity: WFL  Left Lower Extremity: WFL    Lower Extremity Strength: No strength deficits noted  Right Lower Extremity: WNL  Left Lower Extremity: WNL     Gross motor coordination: WFL    Functional Mobility:  Bed Mobility:  Rolling/Turning Right: Independent  Scooting/Bridging: Independent  Supine to Sit: Independent  Sit to Supine:  (pt remained sitting EOB for breakfast)    Transfers:  Sit <> Stand Assistance: Supervision (from EOB x2 trials)  Sit <> Stand Assistive Device: No Assistive Device    Gait:   Gait Distance: Pt ambulated in hallway setting with CGA from PT initially using B UE support. Pt progressed to single UE support. Pt progressed to no support; however, demo x3 minor LOB req CGA for balance correction. x200 feet total  in OBs unit. No overt LOB, signicant and unpredictable sway noted.  Assistance 1: Stand by Assistance, Contact Guard Assistance  Gait Assistive Device: No device  Gait Pattern: reciprocal  Gait Deviation(s): decreased justin, decreased weight-shifting ability, foot flat, decreased step length, lateral lean, increased stride width    Stairs: Deferred this date 2/2 pt with concerns regarding overground walking; will attempt stairs next session    Balance:   Static Sit: GOOD-: Takes MODERATE challenges from all directions but inconsistently  Dynamic Sit: GOOD-: Maintains balance through MODERATE excursions of active trunk movement,     Static Stand: FAIR+: Takes MINIMAL challenges from all directions  Dynamic stand: FAIR+: Needs CLOSE SUPERVISION during gait and is able to right self with minor LOB    Therapeutic Activities and Exercises:  PT reviewed stroke s/s, risk factors, and mobility needs with pt and pt's spouse at  bedside. PT reviewed recommendations for use of RW for bilateral UE stabilization and for OP PT to address high level balance. Questions/concerns addressed within PT scope of practice.     AM-PAC 6 CLICK MOBILITY  How much help from another person does this patient currently need?   1 = Unable, Total/Dependent Assistance  2 = A lot, Maximum/Moderate Assistance  3 = A little, Minimum/Contact Guard/Supervision  4 = None, Modified [place]/Independent    Turning over in bed (including adjusting bedclothes, sheets and blankets)?: 4  Sitting down on and standing up from a chair with arms (e.g., wheelchair, bedside commode, etc.): 4  Moving from lying on back to sitting on the side of the bed?: 4  Moving to and from a bed to a chair (including a wheelchair)?: 4  Need to walk in hospital room?: 3  Climbing 3-5 steps with a railing?: 3  Total Score: 22     AM-PAC Raw Score CMS G-Code Modifier Level of Impairment Assistance   6 % Total / Unable   7 - 9 CM 80 - 100% Maximal Assist   10 - 14 CL 60 - 80% Moderate Assist   15 - 19 CK 40 - 60% Moderate Assist   20 - 22 CJ 20 - 40% Minimal Assist   23 CI 1-20% SBA / CGA   24 CH 0% Independent/ Mod I     Patient left sitting EOB to eat breakfast with all lines intact, call button in reach and RN notified.    Assessment:   Bessy Nunez is a 57 y.o. male with a medical diagnosis of Diplopia and presents with impaired dynamic standing balance and gait instability. Pt req assist for gait in hallway setting and req intermittent CGA for balance correction given LOB. Pt is a high fall risk and demo a fear of falling. Education provided on need for OP PT. Patient will benefit from continued PT services to address:    Rehab identified problem list/impairments: Rehab identified problem list/impairments: impaired functional mobilty, gait instability, impaired balance, visual deficits, decreased coordination, impaired cognition, decreased safety awareness    Rehab potential is  good.    Activity tolerance: Good    Discharge recommendations: Discharge Facility/Level Of Care Needs: outpatient PT     Barriers to discharge: Barriers to Discharge: None    Equipment Needed After Discharge: shower chair and rolling walker.    GOALS:   Physical Therapy Goals        Problem: Physical Therapy Goal    Goal Priority Disciplines Outcome Goal Variances Interventions   Physical Therapy Goal     PT/OT, PT Ongoing (interventions implemented as appropriate)     Description:  Goals to be met by: 2017     Patient will increase functional independence with mobility by performin. Sit to stand transfer with [place]  2. Bed to chair transfer with Supervision using AD or No AD  3. Gait x150 feet with Supervision using AD or No AD  4. Ascend/descend x1 flight of stairs with left Handrails Supervision  5. Stand for x10 minutes with Stand-by Assistance while performing dynamic standing balance tasks  6. Lower extremity exercise program x15 reps per handout, with supervision              PLAN:    Patient to be seen 6 x/week to address the above listed problems via gait training, therapeutic activities, therapeutic exercises, neuromuscular re-education  Plan of Care expires: 17  Plan of Care reviewed with: patient, spouse     Loren Barcenas, PT, DPT  [phone]  2017

## 2017-05-13 NOTE — SUBJECTIVE & OBJECTIVE
"Past Medical History:   Diagnosis Date    Diabetes mellitus type II     Hyperlipidemia     Hypertension     Lipodystrophy     CHASE (nonalcoholic steatohepatitis)     Obesity     Stroke        Past Surgical History:   Procedure Laterality Date    SKIN CANCER EXCISION         Review of patient's allergies indicates:  No Known Allergies        No current facility-administered medications on file prior to encounter.      Current Outpatient Prescriptions on File Prior to Encounter   Medication Sig    aspirin (ECOTRIN) 81 MG EC tablet Take 81 mg by mouth once daily.    atenolol (TENORMIN) 50 MG tablet Take 1 tablet (50 mg total) by mouth once daily.    atorvastatin (LIPITOR) 40 MG tablet Take 1 tablet (40 mg total) by mouth once daily.    diltiaZEM (DILACOR XR) 240 MG CDCR Take 1 capsule (240 mg total) by mouth once daily.    insulin degludec (TRESIBA FLEXTOUCH U-200) 200 unit/mL (3 mL) InPn Inject 42 Units into the skin every evening.    insulin needles, disposable, (BD ULTRA-FINE ANA PEN NEEDLES) 32 x 5/32 " Ndle Inject twice daily    liraglutide 0.6 mg/0.1 mL, 18 mg/3 mL, subq PNIJ (VICTOZA 3-TOMASZ) 0.6 mg/0.1 mL (18 mg/3 mL) PnIj Inject 1.8 mg into the skin once daily.    metformin (GLUCOPHAGE-XR) 500 MG 24 hr tablet Take 2 tablets (1,000 mg total) by mouth 2 (two) times daily with meals.    omega-3 fatty acids-vitamin E (FISH OIL) 1,000 mg Cap Take 1 capsule by mouth 2 (two) times daily.    pen needle, diabetic 32 gauge x 1/6" Ndle 1 Device by Misc.(Non-Drug; Combo Route) route once daily. Dispense novofine, or other brand as covered by insurance    sertraline (ZOLOFT) 100 MG tablet 1 and half tablet daily    valsartan (DIOVAN) 320 MG tablet Take 1 tablet (320 mg total) by mouth once daily.     Family History     Problem Relation (Age of Onset)    Cancer Father    Diabetes Maternal Aunt    Heart disease Mother    Heart failure Mother    Hypertension Mother        Social History Main Topics    " Smoking status: Never Smoker    Smokeless tobacco: Never Used    Alcohol use No    Drug use: No    Sexual activity: Not on file     Review of Systems  Objective:     Vital Signs (Most Recent):  Temp: 99.1 °F (37.3 °C) (05/13/17 1200)  Pulse: 96 (05/13/17 1500)  Resp: 20 (05/13/17 1200)  BP: (!) 147/92 (05/13/17 1200)  SpO2: 96 % (05/13/17 1200) Vital Signs (24h Range):  Temp:  [98 °F (36.7 °C)-99.1 °F (37.3 °C)] 99.1 °F (37.3 °C)  Pulse:  [] 96  Resp:  [16-21] 20  SpO2:  [94 %-98 %] 96 %  BP: (132-153)/(79-92) 147/92     Weight: 93.6 kg (206 lb 5.6 oz)  Body mass index is 29.61 kg/(m^2).    Physical Exam      General appearance: Well nourished, well developed, no acute distress.         Cardiovascular:  pedal pulses 2, no edema or cyanosis, heart regular rate and rhythym, no carotid bruits.         -------------------------------------------------------------  Facial Expression: normal       Affect: full       Orientation to time & place:  Oriented to time, place, person and situation       Attention & concentration:  Normal attention span and concentration       Memory:  Recent and remote memory intact  Language: Spontaneous, fluent; able to repeat and name objects        Fund of knowledge:  Aware of current events        Speech:  normal (not dysarthric)  -------------------------------------------------------  Cranial nerves: normal visual acuity, visual fields full, optic discs not well visualized due small pupils, pupils equal round and reactive, extraocular movements intact when separately tested, but clear JAYDEN,       facial sensation intact, face symmetrical, hearing intact to whisper, palate raises midline, shoulder shrug strength normal, tongue protrudes midline.        -------------------------------------------------------  Musculoskeletal  Muscle tone: all 4 extremities normal        Muscle Bulk: all 4 extremities normal        Muscle strength:  5/5 in all 4 extremities        No pronator  drift  Sensation: Intact to light touch, pin prick, vibration in all extremities        Deep tendon Reflexes: 2+ bilateral biceps, triceps, patella and ankles        --------------------------------------------------------------  Cerebellar and Coordination  Gait:  normal, able to tandem without difficulty        Finger-nose: +dysmetria, mild, on right       Rapid Alternating Movements (pronation/supination):  R normal; L normal  --------------------------------------------------------------  MOVEMENT DISORDERS FOCUSED EXAM  Abnormality of movement (bradykinesia, hyperkinesia) present? No    Tremor present?   No   Posture:  normal  Postural stability:  no [place]        Significant Labs:   BMP:   Recent Labs  Lab 05/13/17 0138   *   *   K 4.8      CO2 17*   BUN 19   CREATININE 1.2   CALCIUM 9.4     CBC:   Recent Labs  Lab 05/13/17 0138   WBC 6.28   HGB 15.2   HCT 41.4        Inflammatory Markers: No results for input(s): SEDRATE, CRP, PROCAL in the last 48 hours.    Significant Imaging: I have reviewed all pertinent imaging results/findings within the past 24 hours.     MRI brain 5/13/17:  Multiple foci of FLAIR hyperintensity in the deep cerebral periventricular white matter in a configuration and distribution which can be seen in the setting of underlying demyelinating process such as multiple sclerosis. There are several punctate foci of restricted diffusion including the left aspect of the midbrain tectum which in light of the aforementioned white matter changes may reflect regions of recent demyelination. Superimposed small acute infarcts would be difficult to exclude, although are felt less likely given the overall constellation of findings. Additionally, there is a 0.7 cm enhancing T2/FLAIR identified in the anterior right thalamus concerning for focus of active demyelination.         Also old lesions in corpus collosum

## 2017-05-13 NOTE — NURSING
Received from MRI per w/c in NAD. Awake alert and oriented,skin warm and dry to touch color pink. Oriented to room and call bell system. Placed on telemetry normal sinus rhythm. Neuro assess WNL right eye does not track well. Call bell in reach

## 2017-05-13 NOTE — ASSESSMENT & PLAN NOTE
57 y.o. male with significant past medical history of DM II, HTN, HLD, CHASE, and previous stroke presented to hospital complaining of acute onset diplopia that began yesterday morning.  MRI pending.  Antithrombotics for secondary stroke prevention: Antiplatelets:  Aspirin: 81 mg oral now and daily  Statins for secondary stroke prevention and hyperlipidemia, if present: Atorvastatin- 80 mg oral daily  Aggressive risk factor modification: Hypertension, Diabetes, High Cholesterol and Obesity Rehab Efforts: Physical Therapy, Occupational Therapy and Speech and Language Pathology Diagnostics: Ordered/Pending HgbA1C to assess blood glucose levels, Lipid Profile to assess cholesterol levels, MRA head to assess vasculature, MRA neck/arch to assess vasculature, MRI head without contrast to assess brain parenchyma, Trans-thoracic cardiac echo to assess cardiac function/status, TSH to assess thyroid function  VTE Prophylaxis: Heparin 5000 units SQ every 8 hours  BP Parameters: SBP <180  Nursing Orders: Neuro checks- Q 4 hours, Complete SHIRLEY unless evaluated by speech, Seizure precautions, Out of bed BID, Avoid Toro catheter, Pneumatic compression device, Stroke Education, Blood glucose target 100-130, Up with assistance

## 2017-05-13 NOTE — ASSESSMENT & PLAN NOTE
MRI with active lesion in thalamus, query MS   -> admit to medicine, General neurology consulting   -> start solumedrol 500mg today, then 1 gm tomorrow, Monday and Tuesday AM   -> plan for LP with ms panel   -> labs today to r/o mimicks (ACE, REYMUNDO, nmo)

## 2017-05-13 NOTE — SUBJECTIVE & OBJECTIVE
"     Past Medical History:   Diagnosis Date    Diabetes mellitus type II     Hyperlipidemia     Hypertension     Lipodystrophy     CHASE (nonalcoholic steatohepatitis)     Obesity     Stroke      Past Surgical History:   Procedure Laterality Date    SKIN CANCER EXCISION       Family History   Problem Relation Age of Onset    Heart disease Mother     Hypertension Mother     Heart failure Mother     Cancer Father      lung    Diabetes Maternal Aunt      Social History   Substance Use Topics    Smoking status: Never Smoker    Smokeless tobacco: Never Used    Alcohol use No     Review of patient's allergies indicates:  No Known Allergies  Medications: I have reviewed the current medication administration record.    Current Outpatient Prescriptions:     aspirin (ECOTRIN) 81 MG EC tablet, Take 81 mg by mouth once daily., Disp: , Rfl:     atenolol (TENORMIN) 50 MG tablet, Take 1 tablet (50 mg total) by mouth once daily., Disp: 90 tablet, Rfl: 3    atorvastatin (LIPITOR) 40 MG tablet, Take 1 tablet (40 mg total) by mouth once daily., Disp: 90 tablet, Rfl: 3    diltiaZEM (DILACOR XR) 240 MG CDCR, Take 1 capsule (240 mg total) by mouth once daily., Disp: 90 capsule, Rfl: 3    insulin degludec (TRESIBA FLEXTOUCH U-200) 200 unit/mL (3 mL) InPn, Inject 42 Units into the skin every evening., Disp: 3 Syringe, Rfl: 1    insulin needles, disposable, (BD ULTRA-FINE ANA PEN NEEDLES) 32 x 5/32 " Ndle, Inject twice daily, Disp: 100 each, Rfl: 3    liraglutide 0.6 mg/0.1 mL, 18 mg/3 mL, subq PNIJ (VICTOZA 3-TOMASZ) 0.6 mg/0.1 mL (18 mg/3 mL) PnIj, Inject 1.8 mg into the skin once daily., Disp: 9 mL, Rfl: 11    metformin (GLUCOPHAGE-XR) 500 MG 24 hr tablet, Take 2 tablets (1,000 mg total) by mouth 2 (two) times daily with meals., Disp: 360 tablet, Rfl: 0    omega-3 fatty acids-vitamin E (FISH OIL) 1,000 mg Cap, Take 1 capsule by mouth 2 (two) times daily., Disp: , Rfl: 0    pen needle, diabetic 32 gauge x 1/6" Ndle, " 1 Device by Misc.(Non-Drug; Combo Route) route once daily. Dispense novofine, or other brand as covered by insurance, Disp: 303 each, Rfl: 3    sertraline (ZOLOFT) 100 MG tablet, 1 and half tablet daily, Disp: 135 tablet, Rfl: 3    valsartan (DIOVAN) 320 MG tablet, Take 1 tablet (320 mg total) by mouth once daily., Disp: 90 tablet, Rfl: 3        Review of Systems   Constitutional: Negative for chills and fever.   HENT: Negative for drooling and trouble swallowing.    Eyes: Positive for visual disturbance. Negative for photophobia, pain, discharge and redness.   Respiratory: Negative for cough and shortness of breath.    Cardiovascular: Negative for chest pain, palpitations and leg swelling.   Gastrointestinal: Negative for abdominal pain, nausea and vomiting.   Endocrine: Negative for cold intolerance and heat intolerance.   Genitourinary: Negative for dysuria and hematuria.   Musculoskeletal: Positive for gait problem.   Skin: Negative for rash.   Allergic/Immunologic: Positive for food allergies. Negative for environmental allergies.   Neurological: Positive for dizziness. Negative for seizures, facial asymmetry, speech difficulty, weakness, light-headedness, numbness and headaches.   Hematological: Negative for adenopathy. Does not bruise/bleed easily.   Psychiatric/Behavioral: Negative for confusion.     Objective:     Vital Signs (Most Recent):  Temp: 98 °F (36.7 °C) (05/13/17 0024)  Pulse: 88 (05/13/17 0231)  Resp: 20 (05/13/17 0231)  BP: (!) 150/80 (05/13/17 0231)  SpO2: 97 % (05/13/17 0231)    Vital Signs Range (Last 24H):  Temp:  [98 °F (36.7 °C)]   Pulse:  []   Resp:  [16-20]   BP: (132-153)/(79-85)   SpO2:  [94 %-98 %]     Physical Exam   Constitutional: He is oriented to person, place, and time. He appears well-developed and well-nourished. No distress.   HENT:   Head: Normocephalic and atraumatic.   Right Ear: External ear normal.   Left Ear: External ear normal.   Nose: Nose normal.    Mouth/Throat: Oropharynx is clear and moist. No oropharyngeal exudate.   Eyes: Conjunctivae are normal. Pupils are equal, round, and reactive to light. Right eye exhibits no discharge. Left eye exhibits no discharge. No scleral icterus.   Neck: Normal range of motion. Neck supple. No thyromegaly present.   Cardiovascular: Normal rate, regular rhythm and intact distal pulses.    Murmur heard.  Pulmonary/Chest: Effort normal and breath sounds normal. No respiratory distress. He has no wheezes.   Abdominal: Soft. Bowel sounds are normal. He exhibits no distension. There is no tenderness.   Musculoskeletal: He exhibits no edema.   Lymphadenopathy:     He has no cervical adenopathy.   Neurological: He is alert and oriented to person, place, and time. GCS eye subscore is 4 - spontaneous. GCS verbal subscore is 5 - oriented. GCS motor subscore is 6 - obeys commands.   Skin: Skin is warm and dry. He is not diaphoretic.   Psychiatric: He has a normal mood and affect.   Nursing note and vitals reviewed.      Neurological Exam:   LOC: alert and follows requests  Language: No aphasia  Speech: No dysarthria  Orientation: Person, Place, Time  Visual Fields (CN II): Hemianopsia  left  EOM (CN III, IV, VI): Palsy: CN III: left upper  Pupils (CN III, IV, VI): PERRL  Facial Sensation (CN V): Symmetric  Facial Movement (CN VII): symmetric facial expression  Tongue (CN XII): to midline  Motor*: Arm Left:  Normal (5/5), Leg Left:   Normal (5/5), Arm Right:   Normal (5/5), Leg Right:   Normal (5/5)  Cerebellar*: Finger/Nose Abnormal - right upper  Sensation: intact to light touch, temperature and vibration    NIH Stroke Scale:  Interval: baseline (upon arrival/admit)  Level of Consciousness: 0 - alert  LOC Questions: 0 - answers both correctly  LOC Commands: 0 - performs both correctly  Best Gaze: 1 - partial gaze palsy (left eye, right gaze)  Visual: 1 - partial hemianopia (left eye)  Facial Palsy: 0 - normal  Motor Left Arm: 0 - no  drift  Motor Right Arm: 0 - no drift  Motor Left Le - no drift  Motor Right Le - no drift  Limb Ataxia: 1 - present in one limb (RUE finger overshoot)  Sensory: 0 - normal  Best Language: 0 - no aphasia  Dysarthria: 0 - normal articulation  Extinction and Inattention: 0 - no neglect  NIH Stroke Scale Total: 3  [place] Coma Scale:  Best Eye Response: 4 - spontaneous  Best Motor Response: 6 - obeys commands  Best Verbal Response: 5 - oriented  [place] Coma Scale Total: 15  Modified [place] Scale:   Timeline: Prior to symptoms onset  Modified [place] Score: 1 - no significant disability    [id number] Scale:   Age: 0 - < 65 years old  CHF History: 1 - yes  HTN History: 1 - yes  Stroke/TIA/Thromboembolism History: 2 - yes  Vascular Disease History: 0 - no  Diabetes Mellitus in History: 1 - yes  Female: 0 - no  [id number] Scale Total: 5      Laboratory:  CMP:   Recent Labs  Lab 17   CALCIUM 9.4   ALBUMIN 3.5   PROT 7.8   *   K 4.8   CO2 17*      BUN 19   CREATININE 1.2   ALKPHOS 85   ALT 39   AST 59*   BILITOT 0.7     CBC:   Recent Labs  Lab 17   WBC 6.28   RBC 5.05   HGB 15.2   HCT 41.4      MCV 82   MCH 30.1   MCHC 36.7*     Lipid Panel:   Recent Labs  Lab 17   CHOL 380*   LDLCALC Invalid, Trig>400.0   HDL 43   TRIG 1246*     Coagulation:   Recent Labs  Lab 17   INR 1.0   APTT <21.0     Hgb A1C: No results for input(s): HGBA1C in the last 168 hours.  TSH:   Recent Labs  Lab 17   TSH 1.248       Diagnostic Results:  Brain Imaging: pending    Cerebrovascular Imaging: pending    Cervical Vascular Imaging: pending    Cardiac Evaluation: pending  EKG/Telemetry: Sinus rhythm

## 2017-05-13 NOTE — PLAN OF CARE
Problem: Physical Therapy Goal  Goal: Physical Therapy Goal  Goals to be met by: 2017     Patient will increase functional independence with mobility by performin. Sit to stand transfer with [place]  2. Bed to chair transfer with Supervision using AD or No AD  3. Gait x150 feet with Supervision using AD or No AD  4. Ascend/descend x1 flight of stairs with left Handrails Supervision  5. Stand for x10 minutes with Stand-by Assistance while performing dynamic standing balance tasks  6. Lower extremity exercise program x15 reps per handout, with supervision  Outcome: Ongoing (interventions implemented as appropriate)     PT Evaluation complete. Recommending OP PT upon D/C.     Loren Barcenas, PT, DPT  [phone]  10/5/2016

## 2017-05-13 NOTE — ED TRIAGE NOTES
Yesterday afternoon pt awoke and was unbalanced with double vision in his right eye. On exam right eye moves independently from left eye. JO 4mm.

## 2017-05-13 NOTE — PT/OT/SLP EVAL
Speech Language Pathology      Bessy Nunez  MRN: [id number]  OBS 11/OBS 11A     Patient not seen today secondary to Other (Comment) (Pt not in room. Unable to locate. RN aware. ). Will follow-up 5/14.    JULIO Alexandra, CCC-SLP, [facility], 5/13/2017

## 2017-05-13 NOTE — ASSESSMENT & PLAN NOTE
Acute diplopia with new, enhancing lesion in right thalamus.  radiographically consistent with demyelinating disease, so will eval for MS.

## 2017-05-14 PROBLEM — F05 ACUTE CONFUSIONAL STATE: Status: ACTIVE | Noted: 2017-05-14

## 2017-05-14 PROBLEM — Z79.4 TYPE 2 DIABETES MELLITUS WITH DIABETIC POLYNEUROPATHY, WITH LONG-TERM CURRENT USE OF INSULIN: Status: ACTIVE | Noted: 2017-05-14

## 2017-05-14 PROBLEM — H51.22 INTERNUCLEAR OPHTHALMOPLEGIA OF LEFT EYE: Status: ACTIVE | Noted: 2017-05-13

## 2017-05-14 PROBLEM — E11.42 TYPE 2 DIABETES MELLITUS WITH DIABETIC POLYNEUROPATHY, WITH LONG-TERM CURRENT USE OF INSULIN: Status: ACTIVE | Noted: 2017-05-14

## 2017-05-14 LAB
ALBUMIN SERPL BCP-MCNC: 3.4 G/DL
ALP SERPL-CCNC: 77 U/L
ALT SERPL W/O P-5'-P-CCNC: 34 U/L
ANION GAP SERPL CALC-SCNC: 12 MMOL/L
ANION GAP SERPL CALC-SCNC: 12 MMOL/L
AST SERPL-CCNC: 30 U/L
BASOPHILS # BLD AUTO: 0.01 K/UL
BASOPHILS NFR BLD: 0.1 %
BILIRUB SERPL-MCNC: 1.1 MG/DL
BUN SERPL-MCNC: 19 MG/DL
BUN SERPL-MCNC: 22 MG/DL
CALCIUM SERPL-MCNC: 8.9 MG/DL
CALCIUM SERPL-MCNC: 9 MG/DL
CHLORIDE SERPL-SCNC: 105 MMOL/L
CHLORIDE SERPL-SCNC: 105 MMOL/L
CLARITY CSF: CLEAR
CLARITY CSF: CLEAR
CO2 SERPL-SCNC: 18 MMOL/L
CO2 SERPL-SCNC: 19 MMOL/L
COLOR CSF: ABNORMAL
COLOR CSF: COLORLESS
CREAT SERPL-MCNC: 1.2 MG/DL
CREAT SERPL-MCNC: 1.5 MG/DL
DIFFERENTIAL METHOD: ABNORMAL
EOSINOPHIL # BLD AUTO: 0 K/UL
EOSINOPHIL NFR BLD: 0 %
ERYTHROCYTE [DISTWIDTH] IN BLOOD BY AUTOMATED COUNT: 13.6 %
EST. GFR  (AFRICAN AMERICAN): 58.1 ML/MIN/1.73 M^2
EST. GFR  (AFRICAN AMERICAN): >60 ML/MIN/1.73 M^2
EST. GFR  (NON AFRICAN AMERICAN): 50.2 ML/MIN/1.73 M^2
EST. GFR  (NON AFRICAN AMERICAN): >60 ML/MIN/1.73 M^2
GLUCOSE CSF-MCNC: 208 MG/DL
GLUCOSE SERPL-MCNC: 438 MG/DL
GLUCOSE SERPL-MCNC: 496 MG/DL
HCT VFR BLD AUTO: 38 %
HGB BLD-MCNC: 13.5 G/DL
LYMPHOCYTES # BLD AUTO: 0.3 K/UL
LYMPHOCYTES NFR BLD: 3.3 %
LYMPHOCYTES NFR CSF MANUAL: 83 %
LYMPHOCYTES NFR CSF MANUAL: 84 %
MCH RBC QN AUTO: 29.6 PG
MCHC RBC AUTO-ENTMCNC: 35.5 %
MCV RBC AUTO: 83 FL
MONOCYTES # BLD AUTO: 0.2 K/UL
MONOCYTES NFR BLD: 1.7 %
MONOS+MACROS NFR CSF MANUAL: 13 %
MONOS+MACROS NFR CSF MANUAL: 17 %
NEUTROPHILS # BLD AUTO: 9 K/UL
NEUTROPHILS NFR BLD: 94.6 %
NEUTROPHILS NFR CSF MANUAL: 3 %
PLATELET # BLD AUTO: 164 K/UL
PMV BLD AUTO: 9.5 FL
POCT GLUCOSE: 331 MG/DL (ref 70–110)
POCT GLUCOSE: 358 MG/DL (ref 70–110)
POCT GLUCOSE: 433 MG/DL (ref 70–110)
POCT GLUCOSE: 461 MG/DL (ref 70–110)
POCT GLUCOSE: 474 MG/DL (ref 70–110)
POCT GLUCOSE: 480 MG/DL (ref 70–110)
POTASSIUM SERPL-SCNC: 4.3 MMOL/L
POTASSIUM SERPL-SCNC: 4.5 MMOL/L
PROT CSF-MCNC: 93 MG/DL
PROT SERPL-MCNC: 6.9 G/DL
RBC # BLD AUTO: 4.56 M/UL
RBC # CSF: 600 /CU MM
RBC # CSF: 9 /CU MM
SODIUM SERPL-SCNC: 135 MMOL/L
SODIUM SERPL-SCNC: 136 MMOL/L
SPECIMEN VOL CSF: 2.2 ML
SPECIMEN VOL CSF: 3.5 ML
WBC # BLD AUTO: 9.48 K/UL
WBC # CSF: 2 /CU MM
WBC # CSF: 4 /CU MM

## 2017-05-14 PROCEDURE — 88108 CYTOPATH CONCENTRATE TECH: CPT | Mod: 26,,, | Performed by: PATHOLOGY

## 2017-05-14 PROCEDURE — 88108 CYTOPATH CONCENTRATE TECH: CPT | Performed by: PATHOLOGY

## 2017-05-14 PROCEDURE — 87070 CULTURE OTHR SPECIMN AEROBIC: CPT

## 2017-05-14 PROCEDURE — 25000003 PHARM REV CODE 250: Performed by: NURSE PRACTITIONER

## 2017-05-14 PROCEDURE — 25000003 PHARM REV CODE 250: Performed by: INTERNAL MEDICINE

## 2017-05-14 PROCEDURE — 63600175 PHARM REV CODE 636 W HCPCS: Performed by: STUDENT IN AN ORGANIZED HEALTH CARE EDUCATION/TRAINING PROGRAM

## 2017-05-14 PROCEDURE — 89051 BODY FLUID CELL COUNT: CPT

## 2017-05-14 PROCEDURE — 99233 SBSQ HOSP IP/OBS HIGH 50: CPT | Mod: 25,,, | Performed by: PSYCHIATRY & NEUROLOGY

## 2017-05-14 PROCEDURE — G8996 SWALLOW CURRENT STATUS: HCPCS | Mod: CH

## 2017-05-14 PROCEDURE — 85025 COMPLETE CBC W/AUTO DIFF WBC: CPT

## 2017-05-14 PROCEDURE — 86592 SYPHILIS TEST NON-TREP QUAL: CPT

## 2017-05-14 PROCEDURE — 009U3ZX DRAINAGE OF SPINAL CANAL, PERCUTANEOUS APPROACH, DIAGNOSTIC: ICD-10-PCS | Performed by: PSYCHIATRY & NEUROLOGY

## 2017-05-14 PROCEDURE — 87205 SMEAR GRAM STAIN: CPT

## 2017-05-14 PROCEDURE — 62270 DX LMBR SPI PNXR: CPT | Mod: ,,, | Performed by: PSYCHIATRY & NEUROLOGY

## 2017-05-14 PROCEDURE — 89051 BODY FLUID CELL COUNT: CPT | Mod: 91

## 2017-05-14 PROCEDURE — 84157 ASSAY OF PROTEIN OTHER: CPT

## 2017-05-14 PROCEDURE — 99000 SPECIMEN HANDLING OFFICE-LAB: CPT

## 2017-05-14 PROCEDURE — 83916 OLIGOCLONAL BANDS: CPT

## 2017-05-14 PROCEDURE — 80053 COMPREHEN METABOLIC PANEL: CPT

## 2017-05-14 PROCEDURE — 92610 EVALUATE SWALLOWING FUNCTION: CPT

## 2017-05-14 PROCEDURE — 80048 BASIC METABOLIC PNL TOTAL CA: CPT

## 2017-05-14 PROCEDURE — 82962 GLUCOSE BLOOD TEST: CPT

## 2017-05-14 PROCEDURE — 82945 GLUCOSE OTHER FLUID: CPT

## 2017-05-14 PROCEDURE — 36415 COLL VENOUS BLD VENIPUNCTURE: CPT

## 2017-05-14 PROCEDURE — 99232 SBSQ HOSP IP/OBS MODERATE 35: CPT | Mod: ,,, | Performed by: HOSPITALIST

## 2017-05-14 PROCEDURE — G8997 SWALLOW GOAL STATUS: HCPCS | Mod: CH

## 2017-05-14 PROCEDURE — 82164 ANGIOTENSIN I ENZYME TEST: CPT

## 2017-05-14 PROCEDURE — 63600175 PHARM REV CODE 636 W HCPCS: Performed by: INTERNAL MEDICINE

## 2017-05-14 PROCEDURE — 11000001 HC ACUTE MED/SURG PRIVATE ROOM

## 2017-05-14 RX ORDER — INSULIN ASPART 100 [IU]/ML
12 INJECTION, SOLUTION INTRAVENOUS; SUBCUTANEOUS
Status: DISCONTINUED | OUTPATIENT
Start: 2017-05-14 | End: 2017-05-15

## 2017-05-14 RX ORDER — RAMELTEON 8 MG/1
8 TABLET ORAL NIGHTLY PRN
Status: DISCONTINUED | OUTPATIENT
Start: 2017-05-15 | End: 2017-05-18 | Stop reason: HOSPADM

## 2017-05-14 RX ORDER — LIDOCAINE HYDROCHLORIDE 10 MG/ML
1 INJECTION, SOLUTION EPIDURAL; INFILTRATION; INTRACAUDAL; PERINEURAL ONCE
Status: DISCONTINUED | OUTPATIENT
Start: 2017-05-14 | End: 2017-05-18 | Stop reason: HOSPADM

## 2017-05-14 RX ORDER — ACETAMINOPHEN 325 MG/1
650 TABLET ORAL EVERY 6 HOURS PRN
Status: DISCONTINUED | OUTPATIENT
Start: 2017-05-14 | End: 2017-05-18 | Stop reason: HOSPADM

## 2017-05-14 RX ADMIN — SERTRALINE HYDROCHLORIDE 150 MG: 50 TABLET ORAL at 08:05

## 2017-05-14 RX ADMIN — INSULIN ASPART 10 UNITS: 100 INJECTION, SOLUTION INTRAVENOUS; SUBCUTANEOUS at 11:05

## 2017-05-14 RX ADMIN — INSULIN ASPART 10 UNITS: 100 INJECTION, SOLUTION INTRAVENOUS; SUBCUTANEOUS at 05:05

## 2017-05-14 RX ADMIN — INSULIN ASPART 7 UNITS: 100 INJECTION, SOLUTION INTRAVENOUS; SUBCUTANEOUS at 11:05

## 2017-05-14 RX ADMIN — INSULIN ASPART 5 UNITS: 100 INJECTION, SOLUTION INTRAVENOUS; SUBCUTANEOUS at 09:05

## 2017-05-14 RX ADMIN — RAMELTEON 8 MG: 8 TABLET, FILM COATED ORAL at 11:05

## 2017-05-14 RX ADMIN — ASPIRIN 81 MG: 81 TABLET, COATED ORAL at 08:05

## 2017-05-14 RX ADMIN — Medication 3 ML: at 09:05

## 2017-05-14 RX ADMIN — DILTIAZEM HYDROCHLORIDE 240 MG: 240 CAPSULE, COATED, EXTENDED RELEASE ORAL at 08:05

## 2017-05-14 RX ADMIN — INSULIN DETEMIR 20 UNITS: 100 INJECTION, SOLUTION SUBCUTANEOUS at 09:05

## 2017-05-14 RX ADMIN — INSULIN ASPART 12 UNITS: 100 INJECTION, SOLUTION INTRAVENOUS; SUBCUTANEOUS at 05:05

## 2017-05-14 RX ADMIN — INSULIN DETEMIR 15 UNITS: 100 INJECTION, SOLUTION SUBCUTANEOUS at 12:05

## 2017-05-14 RX ADMIN — ATORVASTATIN CALCIUM 80 MG: 20 TABLET, FILM COATED ORAL at 08:05

## 2017-05-14 RX ADMIN — HEPARIN SODIUM 5000 UNITS: 5000 INJECTION, SOLUTION INTRAVENOUS; SUBCUTANEOUS at 05:05

## 2017-05-14 RX ADMIN — SODIUM CHLORIDE: 0.9 INJECTION, SOLUTION INTRAVENOUS at 05:05

## 2017-05-14 RX ADMIN — INSULIN ASPART 10 UNITS: 100 INJECTION, SOLUTION INTRAVENOUS; SUBCUTANEOUS at 07:05

## 2017-05-14 RX ADMIN — INSULIN ASPART 7 UNITS: 100 INJECTION, SOLUTION INTRAVENOUS; SUBCUTANEOUS at 07:05

## 2017-05-14 RX ADMIN — DEXTROSE: 50 INJECTION, SOLUTION INTRAVENOUS at 05:05

## 2017-05-14 NOTE — ASSESSMENT & PLAN NOTE
Acute diplopia (appears to be left JAYDEN) with new, enhancing lesion in right thalamus.  radiographically consistent with demyelinating disease, so will eval for MS.    5/13 start steroids  5/14 LP    RECOMMENDATIONS:   -> LP today   -> continue solumedrol 1000mg daily for total of 4 days (500x1, 1000x3)

## 2017-05-14 NOTE — PROGRESS NOTES
Pt called for assistance. Upon entering room, pt was standing by bed with telemetry box in hand.  All leads and pads had been removed from pt. As pt stumbled backwards onto bed he mumbled something about having to get this off. Instructed pt he must keep pads and leads attached to his body per MD orders. Reattached pads and leads to pt. Dressed pt into gown again. Wife at bedside in chair.

## 2017-05-14 NOTE — PROGRESS NOTES
Patient's wife states that she is angry and frustrated that it's taking the doctors a long time to perform the lumbar puncture. She said that if the LP will not be done by 1 PM, they are leaving and she will take the patient to another hospital. Notified IM 3 of situation. Reassured the wife and patient that the LP will be done this afternoon after MD's finish their rounds. Patient and wife accepted explanation for the delay and said that they will wait.

## 2017-05-14 NOTE — ASSESSMENT & PLAN NOTE
MRI with active lesion in thalamus, query MS     5/13 labs pending REYMUNDO, NMO, ACE    RECOMMENDATIONS:   -> as below

## 2017-05-14 NOTE — PROGRESS NOTES
Progress Note  Hospital Medicine    Primary Team: Networked reference to record PCT   Admit Date: 5/13/2017   Length of Stay:  LOS: 0 days   Attending Physician: Dr. Matilda Rivas    SUBJECTIVE:   Reason for Admission:  Internuclear ophthalmoplegia of left eye    Interval history (See H&P for complete P,F,SHx):   NAEON; VSS. Patient with frequent ambulation without supervision as well as confusion; needs to be frequently reoriented to time but is otherwise awake and oriented. No falls. Transferred from Vascular Neurology now that stroke has been ruled out now with concerns for MS. Neurology consulted and plan for LP today; patient consented this morning. Started on solumedrol for presumed MS; patient reports improvement in balance but still with double vision today.    Medications:  Scheduled Meds:   aspirin  81 mg Oral Daily    atorvastatin  80 mg Oral Daily    diltiaZEM  240 mg Oral Daily    heparin (porcine)  5,000 Units Subcutaneous Q8H    insulin aspart  7 Units Subcutaneous TIDWM    insulin detemir  25 Units Subcutaneous QHS    methylPREDNISolone (SOLU-Medrol) IVPB (doses > 250 mg)   Intravenous Daily    sertraline  150 mg Oral Daily    sodium chloride 0.9%  3 mL Intravenous Q8H                               Continuous Infusions:   sodium chloride 0.9% 150 mL/hr at 05/13/17 1735    sodium chloride 0.9%         PRN Meds:  dextrose 50%, dextrose 50%, glucagon (human recombinant), glucose, glucose, insulin aspart, sodium chloride 0.9%    Allergies:  Review of patient's allergies indicates:  No Known Allergies    Review of Systems:  Constitutional- negative for fever, negative for weakness  Neuro- negative for confusion, negative for hallucinations  CV- negative for chest pain, negative for palpitations  Resp- negative for cough, negative for SOB  GI- negative for nausea, vomiting, negative for diarrhea; negative for constipation  Extrem- negative for pain,negative for swelling    OBJECTIVE:     Temp:   [96.8 °F (36 °C)-99.1 °F (37.3 °C)]   Pulse:  []   Resp:  [16-20]   BP: (134-167)/(81-92)   SpO2:  [94 %-97 %]  Body mass index is 29.61 kg/(m^2).  Intake/Outake:  This Shift:       Net I/O past 24h:   No intake or output data in the 24 hours ending 05/14/17 1131          Physical Exam:  General: well developed, well nourished, no distress   Neck: no JVD, Supple  CV: regular rate and rhythm, S1, S2 normal, no murmur, click, rub or gallop  Resp: clear to auscultation bilaterally and normal respiratory effort  Abdomen:soft, non-tender, without masses or organomegaly, nondistended and normal bowel sounds  Extrem: No cyanosis or clubbing, no edema  Skin: No rashes or lesions or good skin turgor  Psych: Oriented to person, place and time    Laboratory:  CBC/Anemia Labs: Coags:      Recent Labs  Lab 05/13/17 0138 05/14/17 0456   WBC 6.28 9.48   HGB 15.2 13.5*   HCT 41.4 38.0*    164   MCV 82 83   RDW 13.8 13.6      Recent Labs  Lab 05/13/17 0138   INR 1.0   APTT <21.0        Chemistries:     Recent Labs  Lab 05/13/17 0138 05/14/17  0456   * 136   K 4.8 4.5    105   CO2 17* 19*   BUN 19 19   CREATININE 1.2 1.2   CALCIUM 9.4 9.0   PROT 7.8 6.9   BILITOT 0.7 1.1*   ALKPHOS 85 77   ALT 39 34   AST 59* 30        ABG:   No results for input(s): PH, PCO2, PO2, HCO3, POCSATURATED, BE in the last 168 hours.     Cardiac Enzymes: Ejection Fractions:    No results for input(s): CPK, CPKMB, MB, TROPONINI in the last 72 hours. EF   Date Value Ref Range Status   05/13/2017 65 55 - 65    05/11/2016 45 55 - 65         POCT Glucose: HbA1c:      Recent Labs  Lab 05/13/17  0153 05/13/17  1201 05/13/17  1650 05/13/17  2228 05/14/17  0737   POCTGLUCOSE 346* 355* 403* 331* 358*    Hemoglobin A1C   Date Value Ref Range Status   05/13/2017 11.4 (H) 4.5 - 6.2 % Final     Comment:     According to ADA guidelines, hemoglobin A1C <7.0% represents  optimal control in non-pregnant diabetic patients.  Different  metrics may  apply to specific populations.   Standards of Medical Care in Diabetes - 2016.  For the purpose of screening for the presence of diabetes:  <5.7%     Consistent with the absence of diabetes  5.7-6.4%  Consistent with increasing risk for diabetes   (prediabetes)  >or=6.5%  Consistent with diabetes  Currently no consensus exists for use of hemoglobin A1C  for diagnosis of diabetes for children.     04/07/2016 11.8 (H) 4.5 - 6.2 % Final   01/30/2015 10.4 (H) 4.5 - 6.2 % Final         ASSESSMENT/PLAN:     Admit Diagnosis:   Amblyopia, left [H53.002]    Current Problems:   Patient Active Problem List   Diagnosis    Depressive disorder, not elsewhere classified    Mixed hyperlipidemia    Essential hypertension    Lipodystrophy    Type 2 diabetes, uncontrolled, with neuropathy    NAFLD (nonalcoholic fatty liver disease)    Obesity    ED (erectile dysfunction)    Sleep apnea    Hx of colonic polyp    Left facial numbness    Internuclear ophthalmoplegia of left eye    Amblyopia    Demyelinating changes in brain    Ataxia    Acute confusional state       Bessy Nunez is a 57 y.o. male here with Internuclear ophthalmoplegia of left eye and PMH significant for DM2, HTN, HLD, CHASE, and previous stroke presented to hospital complaining of acute onset diplopia that began yesterday morning s/p negative stroke workup now with concerns for MS.     Internuclear ophthalmoplegia of left eye  - patient presented with double vision; seen in ED by Vascular Neurology  - MRI Brain 5/13: multiple foci of FLAIR hypersensitivity in the white matter concerning for demyelinating process  - MRA Head/Neck 5/13: unremarkable; no signs of ischemia  - TSH 5/13: 1.248  - 2D echo 5/13: EF 65; normal read  - Neurology consulted; started work up for MS  - continue solumedrol 1g IV QD for 3 doses  - LP 5/14: in process  - REYMUNDO, NMO, ACE ordered for MS mimics  - B1 and B12 levels ordered    Type 2 diabetes, uncontrolled, with neuropathy  -  stroke risk factor; glucose uncontrolled  - last HgbA1c 11.4 5/13  - home regimen consists of metformin 1000 mg BID; detemir 42U QHS  - hyperglycemia 2/2 IV steroids with chronic poor baseline control  - increased detemir to 15U BID; aspart 12U TIDWM 5/14   - continue moderate correction dose SSI     Mixed hyperlipidemia  - continue atorvastatin 80 mg QD     Essential hypertension  - patient elevated on admission; will continue to monitor  - will restart valsartan tomorrow after LP if BP still elevated     Obesity  - stroke risk factor. Encourage proper diet, regular exercise  - PT/OT ordered    Diet: Diabetic 2000 aleksander  Code Status: FULL  DVT PPx: SCD/ELIZABETH (holding heparin for LP this afternoon 5/14)    Dispo: pending workup of chief complaint; LP    Formal staff assessment to follow.    Fabio Becerra MD  IM PGY-1  Pager: [phone]  5/14/2017, 6:16 AM

## 2017-05-14 NOTE — PHARMACY MED REC
"MedMined Medication Reconciliation  Template    Admission Medication Reconciliation - Pharmacy Consult Note    The home medication history was taken by NAME, TITLE.  Based on information gathered and subsequent review by the clinical pharmacist, the items below may need attention.     You may go to "Admission" then "Reconcile Home Medications" tabs to review and/or act upon these items. Based on information gathered and subsequent review by the clinical pharmacist, the items below may need attention.    Potentially problematic discrepancies with current MAR  o Patient IS taking the following which was not ordered upon admit  o Atenolol 50mg po daily   o Valsartan 320mg po daily  o Liraglutide - inject 1.8mg into the skin once daily - Nonformulary but can be ordered as "patient supplied" with verification of the product by the inpatient pharmacy    o Patient is taking a drug DIFFERENTLY than how ordered upon admit  o Atorvastatin 40mg po daily     Please address this information as you see fit.  Feel free to contact us if you have any questions or require assistance.    Ilana Martínez, PharmD, PGY-1 Pharmacy Resident   [phone] [id number]    Patient's prior to admission medication regimen was as follows:  Prescriptions Prior to Admission   Medication Sig Dispense Refill Last Dose    aspirin (ECOTRIN) 81 MG EC tablet Take 81 mg by mouth once daily.   5/13/2017    atenolol (TENORMIN) 50 MG tablet Take 1 tablet (50 mg total) by mouth once daily. 90 tablet 3 5/13/2017    atorvastatin (LIPITOR) 40 MG tablet Take 1 tablet (40 mg total) by mouth once daily. 90 tablet 3 5/13/2017    diltiaZEM (DILACOR XR) 240 MG CDCR Take 1 capsule (240 mg total) by mouth once daily. 90 capsule 3 5/13/2017    insulin degludec (TRESIBA FLEXTOUCH U-200) 200 unit/mL (3 mL) InPn Inject 42 Units into the skin every evening. 3 Syringe 1 5/12/2017 at Unknown time    insulin needles, disposable, (BD ULTRA-FINE ANA PEN NEEDLES) 32 x 5/32 " " Ndle Inject twice daily 100 each 3 Past Week at Unknown time    liraglutide 0.6 mg/0.1 mL, 18 mg/3 mL, subq PNIJ (VICTOZA 3-TOMASZ) 0.6 mg/0.1 mL (18 mg/3 mL) PnIj Inject 1.8 mg into the skin once daily. 9 mL 11 5/13/2017    metformin (GLUCOPHAGE-XR) 500 MG 24 hr tablet Take 2 tablets (1,000 mg total) by mouth 2 (two) times daily with meals. 360 tablet 0 5/13/2017    omega-3 fatty acids-vitamin E (FISH OIL) 1,000 mg Cap Take 1 capsule by mouth 2 (two) times daily.  0 5/13/2017    sertraline (ZOLOFT) 100 MG tablet 1 and half tablet daily (Patient taking differently: Take 150 mg by mouth once daily. ) 135 tablet 3 5/13/2017    valsartan (DIOVAN) 320 MG tablet Take 1 tablet (320 mg total) by mouth once daily. 90 tablet 3 5/13/2017         Please add appropriate    SmartPhrase below:

## 2017-05-14 NOTE — PLAN OF CARE
Problem: SLP Goal  Goal: SLP Goal  Speech Language Pathology Goals  Goals expected to be met by 5/21  1. Pt will participate in speech language cognitive eval to determine need for intervention.      Swallow evaluation completed with initiated POC. Swallow at BL; suspect cognitive deficits.      aMrva Madrid M.A. CCC-SLP  Speech Language Pathologist  [phone]  5/14/2017

## 2017-05-14 NOTE — PROGRESS NOTES
and 461. Covered patient with mealtime Insulin Aspart 7 units and correction Aspart 10 units subcut. Patient alert and oriented x 4, no distress noted. BMP not performed because patient refused to wait for lab to draw specimen because he wants to eat now. IM 3 notified.

## 2017-05-14 NOTE — PLAN OF CARE
Problem: Patient Care Overview  Goal: Plan of Care Review  Outcome: Ongoing (interventions implemented as appropriate)  Rounding every 2 hours, call light within reach, wife at bedside and pt moved to room closer to nurse's station and further from unit exit to reduce risk of falls. Accuchecks and administer insulin per MD order to control blood sugar levels.

## 2017-05-14 NOTE — PT/OT/SLP EVAL
Speech Language Pathology  Evaluation    Bessy Nunez   MRN: [id number]   Admitting Diagnosis: Diplopia    Diet recommendations: Solid Diet Level: Regular  Liquid Diet Level: Thin   Standard precautions    SLP Treatment Date: 17  Speech Start Time: 747     Speech Stop Time: 075     Speech Total (min): 8 min       TREATMENT BILLABLE MINUTES:  Eval Swallow and Oral Function 8    Diagnosis: Diplopia      Past Medical History:   Diagnosis Date    Diabetes mellitus type II     Hyperlipidemia     Hypertension     Lipodystrophy     CHASE (nonalcoholic steatohepatitis)     Obesity     Stroke      Past Surgical History:   Procedure Laterality Date    SKIN CANCER EXCISION         Has the patient been evaluated by SLP for swallowing? : Yes  Keep patient NPO?: No   General Precautions: Standard,            Social Hx: Patient lives with spouse.    Prior diet: no reported restrictions    Occupational/hobbies/homemaking: Pt recently retired from [employer] and was indep pta. He now enjoys watching TV.     Subjective:  Pt awake; pleasant  Patient goals: did not state    Pain Ratin/10              Pain Rating Post-Intervention: 0/10    Objective:        Oral Musculature Evaluation  Oral Musculature: WNL  Dentition: present and adequate  Mucosal Quality: good  Mandibular Strength and Mobility: WNL  Oral Labial Strength and Mobility: WNL  Lingual Strength and Mobility: WNL  Velar Elevation: WNL  Buccal Strength and Mobility: WNL  Volitional Swallow: able to demonstrate  Voice Prior to PO Intake: clear     Bedside Swallow Eval:  Consistencies Assessed: Thin liquids 7 oz via straw, Puree 4 full spoonfuls, Soft solids 25% chopped potatoes and egg and Solids 1/2 randolph cracker  Oral Phase: WNL  Pharyngeal Phase: no overt clinical  signs/symptoms of aspiration and no overt clinical signs/symptoms of pharyngeal dysphagia    Additional Treatment:    Pt with mild tangential language during session and closing L eye. Spouse and pt  express concern with hallucinations and cognitive skill. Both deny dysphagia. Skilled education provided on aspiration precautions and diet recommendations. Whiteboard updated with diet recommendations/aspiration precautions. No further questions.                                   Assessment:  Bessy Nunez is a 57 y.o. male with a medical diagnosis of Diplopia and presents with baseline swallow function; concern for cognitive impairment.    Do you have any cultural, spiritual, Christianity conflicts, given your current situation?: no     Discharge recommendations: Discharge Facility/Level Of Care Needs: outpatient speech therapy     Goals:   SLP Goals        Problem: SLP Goal    Goal Priority Disciplines Outcome   SLP Goal     SLP    Description:  Speech Language Pathology Goals  Goals expected to be met by 5/21  1.  Pt will participate in speech language cognitive eval to determine need for intervention.                      Plan:   Patient to be seen Therapy Frequency: 5 x/week   Plan of Care expires: 06/12/17  Plan of Care reviewed with: patient, spouse  SLP Follow-up?: Yes              Marva Madrid M.A. CCC-SLP  Speech Language Pathologist  [phone]  5/14/2017

## 2017-05-14 NOTE — PROGRESS NOTES
Pt off telemetry monitor again. Pt sitting on side of bed picking at tape on IV site. Instructed pt to leave it alone. Rewrapped with coban. Placed new pads on pt and attached leads to pads. Asked pt why he removed them again after I instructed him not to and he stated because he was going home. Instructed pt he had to wait for the doctor to come see him first. He stated well he hasn't come to see me today. Instructed pt it was 0530 and the doctor does not come that early to make rounds. Reminded pt he told me yesterday he has a test scheduled for today before he can be discharged. Wife states pt is not like this at home. States he is very passive. Pt does seem to be anxious.

## 2017-05-14 NOTE — NURSING TRANSFER
Nursing Transfer Note      5/14/2017     Transfer To: room 1126    Transfer via wheelchair    Transfer with cardiac monitoring    Transported by patient escort    Medicines sent: Insulin    Chart send with patient: Yes    Notified: spouse    Patient reassessed at: 5/14/17 at 1800     Upon arrival to floor:

## 2017-05-14 NOTE — PROGRESS NOTES
"[facility]  Neurology  Progress Note    Patient Name: Bessy Nunez  MRN: [id number]  Admission Date: 5/13/2017  Hospital Length of Stay: 0 days  Code Status: Full Code   Attending Provider: Matilda Rivas MD  Primary Care Physician: Edgar Nova MD   Principal Problem:Internuclear ophthalmoplegia of left eye      Subjective:     Interval History:   Received solumedrol last night and this am.  Intermittently confused, though denies fairly vehemently   No changes in his diplopia    From Consult 5/13/17:  56 yo M that I'm asked to see for possible MS.  Wife at bedside.  Came to ER today because of acute onset of diplopia Wednesday morning as well as ataxia "like i'm drunk."  Mri suggestive of demyelinating lesions (old and new).  Says he was told he had a stroke 16 years ago, but really "no one knew what was going on."  No symptoms since.  Since being in hospital, he has gotten confused and wandered out of obs unit looking for wife.  He is oriented currently.      Current Facility-Administered Medications   Medication Dose Route Frequency Provider Last Rate Last Dose    0.9%  NaCl infusion   Intravenous Continuous Emily Rainey  mL/hr at 05/13/17 1735      aspirin EC tablet 81 mg  81 mg Oral Daily Georgette M. Carolyn, NP   81 mg at 05/14/17 0854    atorvastatin tablet 80 mg  80 mg Oral Daily Georgette M. Carolyn, NP   80 mg at 05/14/17 0854    dextrose 50% injection 12.5 g  12.5 g Intravenous PRN Georgette M. Carolyn, NP        dextrose 50% injection 25 g  25 g Intravenous PRN Georgette M. Carolyn, NP        diltiaZEM 24 hr capsule 240 mg  240 mg Oral Daily Georgette M. Carolyn, NP   240 mg at 05/14/17 0854    glucagon (human recombinant) injection 1 mg  1 mg Intramuscular PRN Georgette M. Carolyn, NP        glucose chewable tablet 16 g  16 g Oral PRN Georgette M. Carolyn, NP        glucose chewable tablet 24 g  24 g Oral PRN Georgette M. Carolyn, NP        heparin (porcine) injection 5,000 Units  5,000 " Units Subcutaneous Q8H Georgette Leon NP   5,000 Units at 05/14/17 0539    insulin aspart pen 1-10 Units  1-10 Units Subcutaneous QID (AC + HS) PRN Georgette Leon NP   10 Units at 05/14/17 0745    insulin aspart pen 7 Units  7 Units Subcutaneous TIDWM Emily Rainey MD   7 Units at 05/14/17 0745    insulin detemir pen 25 Units  25 Units Subcutaneous QHS Emily Rainey MD   25 Units at 05/13/17 2200    methylPREDNISolone sodium succinate (SOLU-MEDROL) 1,000 mg in dextrose 5 % 100 mL IVPB   Intravenous Daily Emily Rainey MD        sertraline tablet 150 mg  150 mg Oral Daily Georgette Leon NP   150 mg at 05/14/17 0854    sodium chloride 0.9% bolus 500 mL  500 mL Intravenous Continuous PRN Georgette Leon NP        sodium chloride 0.9% flush 3 mL  3 mL Intravenous Q8H Georgette Leon NP   3 mL at 05/13/17 2200       Review of Systems  Objective:     Vital Signs (Most Recent):  Temp: 96.8 °F (36 °C) (05/14/17 0812)  Pulse: 91 (05/14/17 0812)  Resp: 16 (05/14/17 0812)  BP: (!) 167/85 (05/14/17 0812)  SpO2: 95 % (05/14/17 0812) Vital Signs (24h Range):  Temp:  [96.8 °F (36 °C)-99.1 °F (37.3 °C)] 96.8 °F (36 °C)  Pulse:  [] 91  Resp:  [16-20] 16  SpO2:  [94 %-97 %] 95 %  BP: (134-167)/(81-92) 167/85     Weight: 93.6 kg (206 lb 5.6 oz)  Body mass index is 29.61 kg/(m^2).    Physical Exam      General appearance: Well nourished, well developed, no acute distress.   Cardiovascular: pedal pulses 2, no edema or cyanosis, heart regular rate and rhythym, no carotid bruits.   -------------------------------------------------------------  Facial Expression: normal   Affect: full   Orientation to time & place: Oriented to time, place, person and situation   Attention & concentration: Normal attention span and concentration   Memory: Recent and remote memory intact  Language: Spontaneous, fluent; able to repeat and name objects    Fund of knowledge: Aware of current events   Speech: normal  (not dysarthric)  -------------------------------------------------------  Cranial nerves: normal visual acuity, visual fields full, optic discs not well visualized due small pupils, pupils equal round and reactive, extraocular movements intact when separately tested, but clear JAYDEN, facial sensation intact, face symmetrical, hearing intact to whisper, palate raises midline, shoulder shrug strength normal, tongue protrudes midline.   -------------------------------------------------------  Musculoskeletal  Muscle strength: 5/5 in all 4 extremities   No pronator drift  Sensation: Intact to light touch in all extremities   --------------------------------------------------------------  Cerebellar and Coordination  Gait: normal, able to tandem without difficulty   Finger-nose: +dysmetria, mild, on right   Rapid Alternating Movements (pronation/supination): R normal; L normal  --------------------------------------------------------------  MOVEMENT DISORDERS FOCUSED EXAM  Abnormality of movement (bradykinesia, hyperkinesia) present? No   Tremor present? No   Posture: normal  Postural stability: no Rhomberg    Significant Labs:   CBC:   Recent Labs  Lab 05/13/17 0138 05/14/17 0456   WBC 6.28 9.48   HGB 15.2 13.5*   HCT 41.4 38.0*    164     CMP:   Recent Labs  Lab 05/13/17 0138 05/14/17 0456   * 438*   * 136   K 4.8 4.5    105   CO2 17* 19*   BUN 19 19   CREATININE 1.2 1.2   CALCIUM 9.4 9.0   PROT 7.8 6.9   ALBUMIN 3.5 3.4*   BILITOT 0.7 1.1*   ALKPHOS 85 77   AST 59* 30   ALT 39 34   ANIONGAP 15 12   EGFRNONAA >60.0 >60.0     Inflammatory Markers: No results for input(s): SEDRATE, CRP, PROCAL in the last 48 hours.    Significant Imaging: I have reviewed all pertinent imaging results/findings within the past 24 hours.     MRI brain 5/13/17:  Multiple foci of FLAIR hyperintensity in the deep cerebral periventricular white matter in a configuration and distribution which can be seen in the  setting of underlying demyelinating process such as multiple sclerosis. There are several punctate foci of restricted diffusion including the left aspect of the midbrain tectum which in light of the aforementioned white matter changes may reflect regions of recent demyelination. Superimposed small acute infarcts would be difficult to exclude, although are felt less likely given the overall constellation of findings. Additionally, there is a 0.7 cm enhancing T2/FLAIR identified in the anterior right thalamus concerning for focus of active demyelination.           Also old lesions in corpus collosum      Assessment and Plan:     Demyelinating changes in brain  MRI with active lesion in thalamus, query MS     5/13 labs pending REYMUNDO, NMO, ACE    RECOMMENDATIONS:   -> as below    * Internuclear ophthalmoplegia of left eye  Acute diplopia (appears to be left JAYDEN) with new, enhancing lesion in right thalamus.  radiographically consistent with demyelinating disease, so will eval for MS.    5/13 start steroids  5/14 LP    RECOMMENDATIONS:   -> LP today   -> continue solumedrol 1000mg daily for total of 4 days (500x1, 1000x3)    Acute confusional state  Intermittent confusion, disorientation.  Started 5/13 before steroids.   -> please check B12 and B1      Kenyatta Boone MD  Neurology  [facility]

## 2017-05-14 NOTE — NURSING
Pt to unit/room via W/C from OBS. Pt ambulated to bed c/ RN. Oriented to room, call-light/remote, and phone. Pt also instructed on safe methods to using the urinal.    Safety was stressed on not ambulating alone and to call nursing for assistance.

## 2017-05-14 NOTE — PLAN OF CARE
Problem: Diabetes, Type 2 (Adult)  Intervention: Support/Optimize Psychosocial Response to Condition    17   Coping/Psychosocial Interventions   Supportive Measures active listening utilized;relaxation techniques promoted;positive reinforcement provided   Environmental Support calm environment promoted   Psychosocial Support   Family/Support System Care caregiver stress acknowledged       Intervention: Optimize Glycemic Control    17   Nutrition Interventions   Glycemic Management blood glucose monitoring;supplemental insulin given           Problem: Fall Risk (Adult)  Intervention: Reduce Risk/Promote Restraint Free Environment    17   Safety Interventions   Safety Precautions emergency equipment at bedside   Safety Interventions   Environmental Safety Modification clutter free environment maintained;assistive device/personal items within reach;lighting adjusted;room near unit station   Prevent  Drop/Fall   Safety/Security Measures family to remain at bedside       Intervention: Review Medications/Identify Contributors to Fall Risk    17   Safety Interventions   Medication Review/Management medications reviewed;high risk medications identified       Intervention: Patient Rounds    17   Safety Interventions   Patient Rounds bed in low position;bed wheels locked;call light in reach;clutter free environment maintained;ID band on;placement of personal items at bedside;toileting offered;visualized patient       Intervention: Safety Promotion/Fall Prevention    17   Safety Interventions   Safety Promotion/Fall Prevention assistive device/personal item within reach;Fall Risk reviewed with patient/family;Fall Risk signage in place;family to remain at bedside;nonskid shoes/socks when out of bed;side rails raised x 2       Intervention: Safety Precautions    17   Safety Interventions   Safety Precautions emergency equipment at bedside            Comments:   Patient resting in bed. Alert and oriented x 4. Intermittent confusion noted but can be easily reoriented. Wife at bedside.  Patient still complains of generalized weakness, intermittent diplopia, and dizziness. Safety maintained at all times. Fall precautions explained to the patient and wife, teachings on fall prevention need to be reinforced.  S/P lumbar puncture today, patient tolerated the procedure well. Band-aid to lumbar area is clean, dry, and intact.  Patient is for transfer to [facility].

## 2017-05-14 NOTE — SUBJECTIVE & OBJECTIVE
"  Subjective:     Interval History:   Received solumedrol last night and this am.  Intermittently confused, though denies fairly vehemently   No changes in his diplopia    From Consult 5/13/17:  58 yo M that I'm asked to see for possible MS.  Wife at bedside.  Came to ER today because of acute onset of diplopia Wednesday morning as well as ataxia "like i'm drunk."  Mri suggestive of demyelinating lesions (old and new).  Says he was told he had a stroke 16 years ago, but really "no one knew what was going on."  No symptoms since.  Since being in hospital, he has gotten confused and wandered out of obs unit looking for wife.  He is oriented currently.      Current Facility-Administered Medications   Medication Dose Route Frequency Provider Last Rate Last Dose    0.9%  NaCl infusion   Intravenous Continuous Emily Rainey  mL/hr at 05/13/17 1735      aspirin EC tablet 81 mg  81 mg Oral Daily Parnassus campusRubén RamiresCarolyn, NP   81 mg at 05/14/17 0854    atorvastatin tablet 80 mg  80 mg Oral Daily Georgette Rubén RamiresCarolyn, NP   80 mg at 05/14/17 0854    dextrose 50% injection 12.5 g  12.5 g Intravenous PRN Georgette MRubén RamiresCarolyn, NP        dextrose 50% injection 25 g  25 g Intravenous PRN Georgette MRubén Leon, NP        diltiaZEM 24 hr capsule 240 mg  240 mg Oral Daily Georgette Rubén Leon, NP   240 mg at 05/14/17 0854    glucagon (human recombinant) injection 1 mg  1 mg Intramuscular PRN Georgette Rubén Leon, NP        glucose chewable tablet 16 g  16 g Oral PRN Georgette Rubén RamiresCarolyn, NP        glucose chewable tablet 24 g  24 g Oral PRN Georgette . Carolyn, NP        heparin (porcine) injection 5,000 Units  5,000 Units Subcutaneous Q8H Georgette Rubén Leon, NP   5,000 Units at 05/14/17 0539    insulin aspart pen 1-10 Units  1-10 Units Subcutaneous QID (AC + HS) PRN Georgette Leon, NP   10 Units at 05/14/17 0745    insulin aspart pen 7 Units  7 Units Subcutaneous TIDWM Emily Rainey MD   7 Units at 05/14/17 0745    insulin detemir pen 25 " Units  25 Units Subcutaneous QHS Emily Rainey MD   25 Units at 05/13/17 2200    methylPREDNISolone sodium succinate (SOLU-MEDROL) 1,000 mg in dextrose 5 % 100 mL IVPB   Intravenous Daily Emily Rainey MD        sertraline tablet 150 mg  150 mg Oral Daily Georgette Leon NP   150 mg at 05/14/17 0854    sodium chloride 0.9% bolus 500 mL  500 mL Intravenous Continuous PRN Georgette Leon NP        sodium chloride 0.9% flush 3 mL  3 mL Intravenous Q8H Georgette Leon NP   3 mL at 05/13/17 2200       Review of Systems  Objective:     Vital Signs (Most Recent):  Temp: 96.8 °F (36 °C) (05/14/17 0812)  Pulse: 91 (05/14/17 0812)  Resp: 16 (05/14/17 0812)  BP: (!) 167/85 (05/14/17 0812)  SpO2: 95 % (05/14/17 0812) Vital Signs (24h Range):  Temp:  [96.8 °F (36 °C)-99.1 °F (37.3 °C)] 96.8 °F (36 °C)  Pulse:  [] 91  Resp:  [16-20] 16  SpO2:  [94 %-97 %] 95 %  BP: (134-167)/(81-92) 167/85     Weight: 93.6 kg (206 lb 5.6 oz)  Body mass index is 29.61 kg/(m^2).    Physical Exam      General appearance: Well nourished, well developed, no acute distress.   Cardiovascular: pedal pulses 2, no edema or cyanosis, heart regular rate and rhythym, no carotid bruits.   -------------------------------------------------------------  Facial Expression: normal   Affect: full   Orientation to time & place: Oriented to time, place, person and situation   Attention & concentration: Normal attention span and concentration   Memory: Recent and remote memory intact  Language: Spontaneous, fluent; able to repeat and name objects    Fund of knowledge: Aware of current events   Speech: normal (not dysarthric)  -------------------------------------------------------  Cranial nerves: normal visual acuity, visual fields full, optic discs not well visualized due small pupils, pupils equal round and reactive, extraocular movements intact when separately tested, but clear JAYDEN, facial sensation intact, face symmetrical, hearing  intact to whisper, palate raises midline, shoulder shrug strength normal, tongue protrudes midline.   -------------------------------------------------------  Musculoskeletal  Muscle strength: 5/5 in all 4 extremities   No pronator drift  Sensation: Intact to light touch in all extremities   --------------------------------------------------------------  Cerebellar and Coordination  Gait: normal, able to tandem without difficulty   Finger-nose: +dysmetria, mild, on right   Rapid Alternating Movements (pronation/supination): R normal; L normal  --------------------------------------------------------------  MOVEMENT DISORDERS FOCUSED EXAM  Abnormality of movement (bradykinesia, hyperkinesia) present? No   Tremor present? No   Posture: normal  Postural stability: no Rhomberg    Significant Labs:   CBC:   Recent Labs  Lab 05/13/17 0138 05/14/17 0456   WBC 6.28 9.48   HGB 15.2 13.5*   HCT 41.4 38.0*    164     CMP:   Recent Labs  Lab 05/13/17 0138 05/14/17 0456   * 438*   * 136   K 4.8 4.5    105   CO2 17* 19*   BUN 19 19   CREATININE 1.2 1.2   CALCIUM 9.4 9.0   PROT 7.8 6.9   ALBUMIN 3.5 3.4*   BILITOT 0.7 1.1*   ALKPHOS 85 77   AST 59* 30   ALT 39 34   ANIONGAP 15 12   EGFRNONAA >60.0 >60.0     Inflammatory Markers: No results for input(s): SEDRATE, CRP, PROCAL in the last 48 hours.    Significant Imaging: I have reviewed all pertinent imaging results/findings within the past 24 hours.     MRI brain 5/13/17:  Multiple foci of FLAIR hyperintensity in the deep cerebral periventricular white matter in a configuration and distribution which can be seen in the setting of underlying demyelinating process such as multiple sclerosis. There are several punctate foci of restricted diffusion including the left aspect of the midbrain tectum which in light of the aforementioned white matter changes may reflect regions of recent demyelination. Superimposed small acute infarcts would be difficult to  exclude, although are felt less likely given the overall constellation of findings. Additionally, there is a 0.7 cm enhancing T2/FLAIR identified in the anterior right thalamus concerning for focus of active demyelination.           Also old lesions in corpus collosum

## 2017-05-14 NOTE — PROGRESS NOTES
. Patient appears in no acute distress. Insulin Aspart 22 units given subcutaneously per scheduled and PRN Insulin orders. CBG result reported to Dr. Quan, IM3. Stat BMP ordered.

## 2017-05-14 NOTE — PROGRESS NOTES
Pt and wife not in room. Wife returns and states she left pt in room. Pt found near hospital  elevator and returned to room. Instructed pt not to leave room unassisted and not to leave unit for any reason. Pt verbalized understanding. Pt transferred from room #11 to room #9 to be closer to nurse's station and further away from door leading off unit. Pt does not seem to understand seriousness of his condition and his inability to ambulate alone safely. Will continue to monitor.

## 2017-05-14 NOTE — PROGRESS NOTES
During neuro assessment, pt's right eye drifts to the right when assessing pupil response to light.

## 2017-05-14 NOTE — PROGRESS NOTES
Report given to DIANA Artis, on IMTA. Patient is for transfer to room 1126 but not clean yet at time of report.

## 2017-05-14 NOTE — ASSESSMENT & PLAN NOTE
Intermittent confusion, disorientation.  Started 5/13 before steroids.   -> please check B12 and B1

## 2017-05-15 LAB
ALBUMIN SERPL BCP-MCNC: 3.5 G/DL
ALP SERPL-CCNC: 80 U/L
ALT SERPL W/O P-5'-P-CCNC: 33 U/L
ANA SER QL IF: NORMAL
ANION GAP SERPL CALC-SCNC: 12 MMOL/L
AST SERPL-CCNC: 23 U/L
BASOPHILS # BLD AUTO: 0 K/UL
BASOPHILS NFR BLD: 0 %
BILIRUB SERPL-MCNC: 0.7 MG/DL
BUN SERPL-MCNC: 21 MG/DL
CALCIUM SERPL-MCNC: 9.2 MG/DL
CHLORIDE SERPL-SCNC: 104 MMOL/L
CO2 SERPL-SCNC: 22 MMOL/L
CREAT SERPL-MCNC: 1.3 MG/DL
DIFFERENTIAL METHOD: ABNORMAL
EOSINOPHIL # BLD AUTO: 0 K/UL
EOSINOPHIL NFR BLD: 0 %
ERYTHROCYTE [DISTWIDTH] IN BLOOD BY AUTOMATED COUNT: 13.9 %
EST. GFR  (AFRICAN AMERICAN): >60 ML/MIN/1.73 M^2
EST. GFR  (NON AFRICAN AMERICAN): 59.7 ML/MIN/1.73 M^2
GLUCOSE SERPL-MCNC: 447 MG/DL
HCT VFR BLD AUTO: 39.2 %
HGB BLD-MCNC: 13.7 G/DL
LYMPHOCYTES # BLD AUTO: 0.6 K/UL
LYMPHOCYTES NFR BLD: 5.9 %
MCH RBC QN AUTO: 29.3 PG
MCHC RBC AUTO-ENTMCNC: 34.9 %
MCV RBC AUTO: 84 FL
MONOCYTES # BLD AUTO: 0.3 K/UL
MONOCYTES NFR BLD: 2.9 %
NEUTROPHILS # BLD AUTO: 8.9 K/UL
NEUTROPHILS NFR BLD: 90.9 %
PLATELET # BLD AUTO: 182 K/UL
PMV BLD AUTO: 9.6 FL
POCT GLUCOSE: 322 MG/DL (ref 70–110)
POCT GLUCOSE: 391 MG/DL (ref 70–110)
POCT GLUCOSE: 421 MG/DL (ref 70–110)
POCT GLUCOSE: 482 MG/DL (ref 70–110)
POTASSIUM SERPL-SCNC: 4 MMOL/L
PROT SERPL-MCNC: 7 G/DL
RBC # BLD AUTO: 4.68 M/UL
SODIUM SERPL-SCNC: 138 MMOL/L
VIT B12 SERPL-MCNC: 434 PG/ML
WBC # BLD AUTO: 9.79 K/UL

## 2017-05-15 PROCEDURE — 80053 COMPREHEN METABOLIC PANEL: CPT

## 2017-05-15 PROCEDURE — 36415 COLL VENOUS BLD VENIPUNCTURE: CPT

## 2017-05-15 PROCEDURE — 11000001 HC ACUTE MED/SURG PRIVATE ROOM

## 2017-05-15 PROCEDURE — 25000003 PHARM REV CODE 250: Performed by: INTERNAL MEDICINE

## 2017-05-15 PROCEDURE — 97535 SELF CARE MNGMENT TRAINING: CPT

## 2017-05-15 PROCEDURE — 99232 SBSQ HOSP IP/OBS MODERATE 35: CPT | Mod: ,,, | Performed by: HOSPITALIST

## 2017-05-15 PROCEDURE — 63600175 PHARM REV CODE 636 W HCPCS: Performed by: INTERNAL MEDICINE

## 2017-05-15 PROCEDURE — 25000003 PHARM REV CODE 250: Performed by: STUDENT IN AN ORGANIZED HEALTH CARE EDUCATION/TRAINING PROGRAM

## 2017-05-15 PROCEDURE — 97116 GAIT TRAINING THERAPY: CPT

## 2017-05-15 PROCEDURE — 99233 SBSQ HOSP IP/OBS HIGH 50: CPT | Mod: ,,, | Performed by: PSYCHIATRY & NEUROLOGY

## 2017-05-15 PROCEDURE — 25000003 PHARM REV CODE 250: Performed by: NURSE PRACTITIONER

## 2017-05-15 PROCEDURE — 63600175 PHARM REV CODE 636 W HCPCS: Performed by: STUDENT IN AN ORGANIZED HEALTH CARE EDUCATION/TRAINING PROGRAM

## 2017-05-15 PROCEDURE — 82607 VITAMIN B-12: CPT

## 2017-05-15 PROCEDURE — 85025 COMPLETE CBC W/AUTO DIFF WBC: CPT

## 2017-05-15 PROCEDURE — 84425 ASSAY OF VITAMIN B-1: CPT

## 2017-05-15 PROCEDURE — 97110 THERAPEUTIC EXERCISES: CPT

## 2017-05-15 RX ORDER — VALSARTAN 160 MG/1
320 TABLET ORAL DAILY
Status: DISCONTINUED | OUTPATIENT
Start: 2017-05-16 | End: 2017-05-18 | Stop reason: HOSPADM

## 2017-05-15 RX ORDER — INSULIN ASPART 100 [IU]/ML
18 INJECTION, SOLUTION INTRAVENOUS; SUBCUTANEOUS
Status: DISCONTINUED | OUTPATIENT
Start: 2017-05-15 | End: 2017-05-17

## 2017-05-15 RX ADMIN — Medication 3 ML: at 05:05

## 2017-05-15 RX ADMIN — RAMELTEON 8 MG: 8 TABLET, FILM COATED ORAL at 08:05

## 2017-05-15 RX ADMIN — INSULIN ASPART 12 UNITS: 100 INJECTION, SOLUTION INTRAVENOUS; SUBCUTANEOUS at 08:05

## 2017-05-15 RX ADMIN — INSULIN ASPART 10 UNITS: 100 INJECTION, SOLUTION INTRAVENOUS; SUBCUTANEOUS at 04:05

## 2017-05-15 RX ADMIN — INSULIN ASPART 5 UNITS: 100 INJECTION, SOLUTION INTRAVENOUS; SUBCUTANEOUS at 08:05

## 2017-05-15 RX ADMIN — SERTRALINE HYDROCHLORIDE 150 MG: 50 TABLET ORAL at 08:05

## 2017-05-15 RX ADMIN — INSULIN ASPART 18 UNITS: 100 INJECTION, SOLUTION INTRAVENOUS; SUBCUTANEOUS at 04:05

## 2017-05-15 RX ADMIN — ATORVASTATIN CALCIUM 80 MG: 20 TABLET, FILM COATED ORAL at 08:05

## 2017-05-15 RX ADMIN — DILTIAZEM HYDROCHLORIDE 240 MG: 240 CAPSULE, COATED, EXTENDED RELEASE ORAL at 08:05

## 2017-05-15 RX ADMIN — INSULIN ASPART 8 UNITS: 100 INJECTION, SOLUTION INTRAVENOUS; SUBCUTANEOUS at 12:05

## 2017-05-15 RX ADMIN — ASPIRIN 81 MG: 81 TABLET, COATED ORAL at 08:05

## 2017-05-15 RX ADMIN — Medication 3 ML: at 09:05

## 2017-05-15 RX ADMIN — INSULIN ASPART 10 UNITS: 100 INJECTION, SOLUTION INTRAVENOUS; SUBCUTANEOUS at 08:05

## 2017-05-15 RX ADMIN — SODIUM CHLORIDE: 0.9 INJECTION, SOLUTION INTRAVENOUS at 09:05

## 2017-05-15 RX ADMIN — DEXTROSE: 50 INJECTION, SOLUTION INTRAVENOUS at 05:05

## 2017-05-15 RX ADMIN — INSULIN ASPART 18 UNITS: 100 INJECTION, SOLUTION INTRAVENOUS; SUBCUTANEOUS at 12:05

## 2017-05-15 NOTE — ASSESSMENT & PLAN NOTE
MRI with active lesion in thalamus, with concern for MS  - F/u REYMUNDO (wnl), NMO, ACE  - Check vitamin D level, and then start vitamin D 5K u daily  - continue solumedrol  - F/u CSF results  - MRI C/T/L WWO

## 2017-05-15 NOTE — PHYSICIAN QUERY
PT Name: Bessy Nunez  MR #: [id number]    Physician Query Form - Neurological Condition Clarification       CDS/: Luisa Beasley               Contact information: [id number] [id number]    This form is a permanent document in the medical record.     Query Date: May 15, 2017    By submitting this query, we are merely seeking further clarification of documentation. Please utilize your independent clinical judgment when addressing the question(s) below.    The Medical record contains the following:   Indicators   Supporting Clinical Findings Location in Medical Record   x AMS, Confusion, LOC, etc. Patient with frequent ambulation without supervision as well as confusion; needs to be frequently reoriented to time but is otherwise awake and oriented   Progress Note 5/14   x Acute / Chronic Illness Patient with frequent ambulation without supervision as well as confusion; needs to be frequently reoriented to time but is otherwise awake and oriented   H/P 5/13    Radiology Findings      Electrolyte Imbalance      Medication      Treatment      Other       Provider, please specify the diagnosis or diagnoses associated with above clinical findings.      [  ] Metabolic Encephalopathy    [  ] Toxic Encephalopathy    [  ] Other Encephalopathy    [x  ] Unspecified Encephalopathy    [  ] Other (please specify): _____________________________________    [  ] Clinically Undetermined      Please document in your progress notes daily for the duration of treatment until resolved, and  include in your discharge summary.

## 2017-05-15 NOTE — PROGRESS NOTES
"[facility]  Neurology  Progress Note    Patient Name: Bessy Nunez  MRN: [id number]  Admission Date: 5/13/2017  Hospital Length of Stay: 1 days  Code Status: Full Code   Attending Provider: Matilda Rivas MD  Primary Care Physician: Edgar Nova MD   Principal Problem:Internuclear ophthalmoplegia of left eye      Subjective:     Interval History:   LP yesterday.  Patient disoriented. Wanders off.  Removed two IVs and a telemetry box, and urinated on the floor OVN x3.    From Consult 5/13/17:  56 yo M that I'm asked to see for possible MS.  Wife at bedside.  Came to ER today because of acute onset of diplopia Wednesday morning as well as ataxia "like i'm drunk."  Mri suggestive of demyelinating lesions (old and new).  Says he was told he had a stroke 16 years ago, but really "no one knew what was going on."  No symptoms since.  Since being in hospital, he has gotten confused and wandered out of obs unit looking for wife.  He is oriented currently.      Current Facility-Administered Medications   Medication Dose Route Frequency Provider Last Rate Last Dose    0.9%  NaCl infusion   Intravenous Continuous Emily Rainey  mL/hr at 05/15/17 0946      acetaminophen tablet 650 mg  650 mg Oral Q6H PRN Fabio Becerra MD        aspirin EC tablet 81 mg  81 mg Oral Daily Georgette Leon, NP   81 mg at 05/15/17 0825    atorvastatin tablet 80 mg  80 mg Oral Daily Georgette Leon, NP   80 mg at 05/15/17 0824    dextrose 50% injection 12.5 g  12.5 g Intravenous PRN Georgette Leon, NP        dextrose 50% injection 25 g  25 g Intravenous PRN Georgette Leon, NP        diltiaZEM 24 hr capsule 240 mg  240 mg Oral Daily Georgette Leon, NP   240 mg at 05/15/17 0825    glucagon (human recombinant) injection 1 mg  1 mg Intramuscular PRN Georgette Leon, NP        glucose chewable tablet 16 g  16 g Oral PRN Georgette Leon, NP        glucose chewable tablet 24 g  24 g Oral PRN " Georgette Leon NP        insulin aspart pen 1-10 Units  1-10 Units Subcutaneous QID (AC + HS) PRN Georgette Leon NP   8 Units at 05/15/17 1244    insulin aspart pen 18 Units  18 Units Subcutaneous TIDWM Guanaco Story MD   18 Units at 05/15/17 1244    insulin detemir pen 26 Units  26 Units Subcutaneous BID Guanaco Story MD   26 Units at 05/15/17 0825    lidocaine (PF) 10 mg/ml (1%) injection 10 mg  1 mL Other Once [place] Mary Beth Quan MD        methylPREDNISolone sodium succinate (SOLU-MEDROL) 1,000 mg in dextrose 5 % 100 mL IVPB   Intravenous Daily Emily Rainey MD        ramelteon tablet 8 mg  8 mg Oral Nightly PRN Anselmo Dumont MD   8 mg at 05/14/17 2334    sertraline tablet 150 mg  150 mg Oral Daily Georgette Leon NP   150 mg at 05/15/17 0825    sodium chloride 0.9% bolus 500 mL  500 mL Intravenous Continuous PRN Georgette Leon NP        sodium chloride 0.9% flush 3 mL  3 mL Intravenous Q8H Georgette Leon NP   3 mL at 05/15/17 0528    [START ON 5/16/2017] valsartan tablet 320 mg  320 mg Oral Daily Guanaco Story MD           Review of Systems   Eyes: Positive for visual disturbance.   Respiratory: Negative for shortness of breath.    Cardiovascular: Negative for chest pain.   Gastrointestinal: Negative for constipation, diarrhea, nausea and vomiting.   Neurological: Negative for weakness.   Psychiatric/Behavioral: Positive for confusion. Negative for agitation. The patient is not nervous/anxious.      Objective:     Vital Signs (Most Recent):  Temp: 97.4 °F (36.3 °C) (05/15/17 1536)  Pulse: 90 (05/15/17 1536)  Resp: 18 (05/15/17 1536)  BP: (!) 141/75 (05/15/17 1536)  SpO2: 95 % (05/15/17 1536) Vital Signs (24h Range):  Temp:  [97.2 °F (36.2 °C)-98.1 °F (36.7 °C)] 97.4 °F (36.3 °C)  Pulse:  [68-95] 90  Resp:  [16-22] 18  SpO2:  [92 %-98 %] 95 %  BP: (135-164)/(65-87) 141/75     Weight: 93.6 kg (206 lb 5.6 oz)  Body mass index is 29.61  kg/(m^2).    Physical Exam        General appearance: Well nourished, well developed, no acute distress.   Cardiovascular: pedal pulses 2, no edema or cyanosis, heart regular rate and rhythym, no carotid bruits.   -------------------------------------------------------------  Facial Expression: normal   Affect: full   Orientation to time & place: Oriented to time, place, person and situation   Attention & concentration: Normal attention span and concentration   Memory: Recent and remote memory intact  Language: Spontaneous, fluent; able to repeat and name objects    Fund of knowledge: Aware of current events   Speech: normal (not dysarthric)  -------------------------------------------------------  Cranial nerves: normal visual acuity, visual fields full, optic discs not well visualized due small pupils, pupils equal round and reactive, extraocular movements intact when separately tested, but clear JAYDEN, facial sensation intact, face symmetrical, hearing intact to whisper, palate raises midline, shoulder shrug strength normal, tongue protrudes midline.   -------------------------------------------------------  Musculoskeletal  Muscle strength: 5/5 in BUE.  4/5 in LLE, 4+/5 RLE   No pronator drift  Sensation: Intact to light touch in all extremities   --------------------------------------------------------------  Cerebellar and Coordination  Gait: normal, able to tandem without difficulty   Finger-nose: +dysmetria, mild, on left  --------------------------------------------------------------  MOVEMENT DISORDERS FOCUSED EXAM  Abnormality of movement (bradykinesia, hyperkinesia) present? No   Tremor present? Yes.  Fine tremor noted in BUE   Posture: normal  Postural stability: no Romberg    Significant Labs:   CBC:     Recent Labs  Lab 05/14/17  0456 05/15/17  0348   WBC 9.48 9.79   HGB 13.5* 13.7*   HCT 38.0* 39.2*    182     CMP:     Recent Labs  Lab 05/14/17  0456 05/14/17  1736 05/15/17  0348   * 496*  447*    135* 138   K 4.5 4.3 4.0    105 104   CO2 19* 18* 22*   BUN 19 22* 21*   CREATININE 1.2 1.5* 1.3   CALCIUM 9.0 8.9 9.2   PROT 6.9  --  7.0   ALBUMIN 3.4*  --  3.5   BILITOT 1.1*  --  0.7   ALKPHOS 77  --  80   AST 30  --  23   ALT 34  --  33   ANIONGAP 12 12 12   EGFRNONAA >60.0 50.2* 59.7*     Inflammatory Markers: No results for input(s): SEDRATE, CRP, PROCAL in the last 48 hours.    Significant Imaging: I have reviewed all pertinent imaging results/findings within the past 24 hours.     MRI brain 5/13/17:  Multiple foci of FLAIR hyperintensity in the deep cerebral periventricular white matter in a configuration and distribution which can be seen in the setting of underlying demyelinating process such as multiple sclerosis. There are several punctate foci of restricted diffusion including the left aspect of the midbrain tectum which in light of the aforementioned white matter changes may reflect regions of recent demyelination. Superimposed small acute infarcts would be difficult to exclude, although are felt less likely given the overall constellation of findings. Additionally, there is a 0.7 cm enhancing T2/FLAIR identified in the anterior right thalamus concerning for focus of active demyelination.           Also old lesions in corpus collosum      Assessment and Plan:     * Internuclear ophthalmoplegia of left eye  Acute diplopia (appears to be left JAYDEN) with new, enhancing lesion in right thalamus, which are radiographically consistent with demyelinating disease    5/13 started steroids  5/14 LP    -> continue solumedrol 1000mg daily for total of 4 days (500x1, 1000x3)    Demyelinating changes in brain  MRI with active lesion in thalamus, with concern for MS  - F/u REYMUNDO (wnl), NMO, ACE  - Check vitamin D level, and then start vitamin D 5K u daily  - continue solumedrol  - F/u CSF results  - MRI C/T/L WWO      Ataxia  Ataxia of gait since 5/10.     -> PT/OT    -> MS work-up as above    Acute  confusional state  Intermittent confusion, disorientation, which started 5/13 before steroids.  - B12 wnl.  F/u B1  - Check SPEP, UPEP, RPR      VTE Risk Mitigation         Ordered     High Risk of VTE  Once      05/13/17 0259     Reason for No Pharmacological VTE Prophylaxis  Once      05/13/17 0259     Place sequential compression device  Until discontinued      05/13/17 0259          Sharon Luong MD  Neurology  [facility]

## 2017-05-15 NOTE — PLAN OF CARE
Problem: Patient Care Overview  Goal: Plan of Care Review  Outcome: Ongoing (interventions implemented as appropriate)  Plan of care reviewed with patient and spouse, pt oriented to self and time, any questions/concerns addressed. Spouse verbalized understanding. V/S stable, afebrile throughout shift. Medicated per MAR, denied pain. Pt free of fall/injury, Bed in lowest position with wheels locked, side rails up x3, call light and belongings within reach, bed alarm set. Will continue to monitor

## 2017-05-15 NOTE — PLAN OF CARE
Problem: Physical Therapy Goal  Goal: Physical Therapy Goal  Goals to be met by: 2017     Patient will increase functional independence with mobility by performin. Sit to stand transfer with [place]  2. Bed to chair transfer with Supervision using AD or No AD  3. Gait x150 feet with Supervision using AD or No AD  4. Ascend/descend x1 flight of stairs with left Handrails Supervision  5. Stand for x10 minutes with Stand-by Assistance while performing dynamic standing balance tasks  6. Lower extremity exercise program x15 reps per handout, with supervision   Outcome: Ongoing (interventions implemented as appropriate)  Goals remain appropriate. Continue with POC.

## 2017-05-15 NOTE — PLAN OF CARE
Problem: Occupational Therapy Goal  Goal: Occupational Therapy Goal  Goals set 5/13 to be addressed for 7 days with expiration date, 5/20:  Patient will increase functional independence with ADLs by performing:  Patient will demonstrate stand pivot transfers with modified independence. Not met  Patient will demonstrate grooming while standing with modified independence. Not met  Patient will demonstrate upper body dressing with modified independence. Not met  Patient will demonstrate lower body dressing with modified independence. Not met  Patient will demonstrate toileting with modified independence. Not met  Patient will demonstrate bathing while seated EOB with modified independence. Not met  Patients family / caregiver will demonstrate independence and safety with assisting patient with self-care skills and functional mobility. Not met  Patient and/or patients family will verbalize understanding of stroke prevention guidelines, personal risk factors and stroke warning signs via teachback method. Not met        Goals remain appropriate.  COLTON Cano  5/15/2017

## 2017-05-15 NOTE — NURSING
Throughout the night pt was confused, removed 2 IVs, and telemetry box, he urinated on the floor x3. He was restless and got out of bed on multiple occasion, reoriented/redirected patient to get back in bed, spouse stayed at the bedside. I administered sleep aid per MD orders, will continue to monitor

## 2017-05-15 NOTE — ASSESSMENT & PLAN NOTE
Acute diplopia (appears to be left JAYDEN) with new, enhancing lesion in right thalamus, which are radiographically consistent with demyelinating disease    5/13 started steroids  5/14 LP    -> continue solumedrol 1000mg daily for total of 4 days (500x1, 1000x3)

## 2017-05-15 NOTE — PLAN OF CARE
Patient and wife concerned 2/2 no insurance. CM explained that it doesn't affect medical care inpatient, but it will have an impact on discharge disposition. CM will discuss discharge in more detail when work up completed; patient and wife verbalize understanding and agreement. CM will confirm that patient has been screened for medicaid.       05/15/17 1308   Discharge Assessment   Assessment Type Discharge Planning Assessment   Confirmed/corrected address and phone number on facesheet? Yes   Assessment information obtained from? Patient;Caregiver;Medical Record   Expected Length of Stay (days) 5   Communicated expected length of stay with patient/caregiver yes   Prior to hospitilization cognitive status: Alert/Oriented   Prior to hospitalization functional status: Independent   Current cognitive status: Alert/Oriented   Current Functional Status: Independent   Arrived From home or self-care   Lives With spouse   Able to Return to Prior Arrangements yes   Is patient able to care for self after discharge? Yes   How many people do you have in your home that can help with your care after discharge? 1   Patient's perception of discharge disposition home or selfcare   Readmission Within The Last 30 Days no previous admission in last 30 days   Patient currently being followed by outpatient case management? No   Patient currently receives home health services? No   Does the patient currently use HME? No   Patient currently receives private duty nursing? No   Patient currently receives any other outside agency services? No   Equipment Currently Used at Home none   Do you have any problems affording any of your prescribed medications? No   Is the patient taking medications as prescribed? yes   Do you have any financial concerns preventing you from receiving the healthcare you need? No   Does the patient have transportation to healthcare appointments? Yes   Transportation Available family or friend will provide   On Dialysis?  No   Does the patient receive services at the [facility]? No   Are there any open cases? No   Discharge Plan A Home with family   Discharge Plan B Home with family   Patient/Family In Agreement With Plan yes

## 2017-05-15 NOTE — PROGRESS NOTES
Hospital Medicine  Progress Note      SUBJECTIVE:     Chief Complaint / Reason for Admission: Acute Onset Diplopia    LOS: 1 days  Admit Date: 5/13/2017     History of Present Illness:  Patient is a 57 y.o. male with significant past medical history of DM II, HTN, HLD, CHASE, and previous stroke presented to hospital complaining of acute onset diplopia that began yesterday morning. The patient states he was in his usual state of health when he awoke from a nap with double vision. He states as he attempted to ambulate, he would walk sideways and into walls secondary to his altered vision. The patient denies HA, numbness, change in speech, CP, or N/V. He endorses dizziness that is relieved by closing one eye. The patient associates his current symptoms to symptoms he had with a previous stroke. He has no residual deficits from that event.  ED workup revealed elevated glucose 383, elevated cholesterol 380, elevated triglycerides 1246. Patient has diplopia, left hemianopsia, left partial gaze palsy (to the right). MRI brain pending. Patient will be admitted to Vascular Neurology for further evaluation.    Transferred from vascular neurology to hospital medicine now that stroke has been ruled out, now with concerns for MS.  Neurology on board.    Interval history  No acute events overnight. VSS, afebrile. Patient and wife reports mild confusion after steroids.  Verbalizes concern about hypoglycemia with increasing insulin for steroid-induced hyperglycemia on top of uncontrolled diabetes.  Addressed patient's concerns that we're titrating up insulin slowly and closely monitoring POCT glucose.  Patient reports improvement in double vision.    Scheduled Medications   aspirin  81 mg Oral Daily    atorvastatin  80 mg Oral Daily    diltiaZEM  240 mg Oral Daily    insulin aspart  18 Units Subcutaneous TIDWM    insulin detemir  26 Units Subcutaneous BID    lidocaine (PF) 10 mg/ml (1%)  1 mL Other Once    methylPREDNISolone  (SOLU-Medrol) IVPB (doses > 250 mg)   Intravenous Daily    sertraline  150 mg Oral Daily    sodium chloride 0.9%  3 mL Intravenous Q8H       Continous Medications   sodium chloride 0.9% 150 mL/hr at 05/15/17 0946    sodium chloride 0.9%         Medications PRN  acetaminophen, dextrose 50%, dextrose 50%, glucagon (human recombinant), glucose, glucose, insulin aspart, ramelteon, sodium chloride 0.9%    OBJECTIVE:     Temp:  [97.2 °F (36.2 °C)-98.1 °F (36.7 °C)]   Pulse:  [68-93]   Resp:  [16-22]   BP: (135-164)/(65-87)   SpO2:  [92 %-98 %]     Vitals:    05/15/17 1209   BP: 135/65   Pulse: 90   Resp: 18   Temp: 97.5 °F (36.4 °C)       I & O (Last 24H):  I/O last 3 completed shifts:  In: 1560 [P.O.:1560]  Out: 1000 [Urine:1000]    Physical Exam:  Constitutional: Patient is oriented to person, place, and time. Patient appears well-developed and well-nourished. No distress.   HENT: Head: Normocephalic and atraumatic. Right Ear: External ear normal. Left Ear: External ear normal. Nose: Nose normal.   Mouth/Throat: Oropharynx is clear and moist. No oropharyngeal exudate.   Eyes: Extraocular movements intact when separately tested, but clear JAYDEN.  Conjunctivae normal. Pupils are equal, round, and reactive to light. Right eye exhibits no discharge. Left eye exhibits no discharge. No scleral icterus.   Neck: Normal range of motion. Neck supple. No JVD present. No tracheal deviation present. No thyromegaly present.   Cardiovascular: Normal rate, regular rhythm, normal heart sounds and intact distal pulses.  Exam reveals no gallop and no friction rub.  No murmur heard.  Pulmonary/Chest: Effort normal and breath sounds normal. No respiratory distress. Patient has no wheezes. Patient has no rales.   Abdominal: Soft. Bowel sounds are normal. Patient exhibits no distension and no mass. There is no tenderness.   Musculoskeletal: Normal range of motion. Patient exhibits no edema or tenderness.   Lymphadenopathy:   Patient has no  cervical adenopathy.   Neurological: Patient is alert and oriented to person, place, and time. Patient has normal reflexes. Patient exhibits normal muscle tone. Coordination normal.   Skin: Skin is warm and dry. No rash noted. Patient is not diaphoretic. No erythema. No pallor.   Psychiatric: Patient has a normal mood and affect. Behavior is normal. Judgment and thought content normal.     Laboratory:  CBC BMP     Recent Labs  Lab 05/14/17  0456 05/15/17  0348   WBC 9.48 9.79   HGB 13.5* 13.7*   HCT 38.0* 39.2*   MCV 83 84    182   RDW 13.6 13.9      Recent Labs  Lab 05/14/17  1736 05/15/17  0348   * 138   K 4.3 4.0    104   CO2 18* 22*   BUN 22* 21*   CREATININE 1.5* 1.3   * 447*   CALCIUM 8.9 9.2          LFTs    Recent Labs  Lab 05/14/17  0456 05/15/17  0348   PROT 6.9 7.0   BILITOT 1.1* 0.7   ALKPHOS 77 80   AST 30 23   ALT 34 33      Urine  No results for input(s): COLORU, CLARITYU, SPECGRAV, PHUR, PROTEINUA, GLUCOSEU, BILIRUBINCON, BLOODU, WBCU, RBCU, BACTERIA, MUCUS, NITRITE, LEUKOCYTESUR, UROBILINOGEN, HYALINECASTS in the last 168 hours.     Coag Labs    Recent Labs  Lab 05/13/17  0138   INR 1.0    Mag & Phos  No results for input(s): MG, PHOS in the last 168 hours.     Lactic Acid  No results for input(s): LACTATE in the last 168 hours.    Cardiac Enzyme  No results for input(s): CKTOTAL, CPK, TROPONINI, TROPONINT, TROPTSTAT, CPKMB, MB in the last 168 hours.    BNP  No results for input(s): BNP in the last 168 hours.    ABG  No results for input(s): PH, PO2, PCO2, HCO3, BE in the last 168 hours.    Thyroid    Recent Labs  Lab 05/13/17  0138   TSH 1.248       Lipase  No results for input(s): LIPASE in the last 168 hours.    Hemoglobin A1C    Recent Labs  Lab 05/13/17  0138   HGBA1C 11.4*       POCT Glucose  Recent Labs      05/13/17   2228  05/14/17   0737  05/14/17   1156  05/14/17   1157  05/14/17   1719  05/14/17   2106  05/15/17   0753  05/15/17   1211   POCTGLUCOSE  331*  358*   474*  461*  480*  433*  482*  322*         Radiology:  Imaging Results         MRI Brain W WO Contrast (Final result)    Abnormal Result time:  05/13/17 06:32:52    Final result by Mikhail Hatch MD (05/13/17 06:32:52)    Impression:         1. Multiple foci of FLAIR hyperintensity in the deep cerebral periventricular white matter in a configuration and distribution which can be seen in the setting of underlying demyelinating process such as multiple sclerosis. There are several punctate foci of restricted diffusion including the left aspect of the midbrain tectum which in light of the aforementioned white matter changes may reflect regions of recent demyelination. Superimposed small acute infarcts would be difficult to exclude, although are felt less likely given the overall constellation of findings. Additionally, there is a 0.7 cm enhancing T2/FLAIR identified in the anterior right thalamus concerning for focus of active demyelination. Clinical correlation with patient history is advised.     2. Unremarkable MRA head or neck without evidence of vascular occlusion.    This report has been flagged in the [facility] medical record.            Electronically signed by: MIKHAIL HATCH  Date:     05/13/17  Time:    06:32     Narrative:    MRI OF THE BRAIN WITHOUT AND WITH IV CONTRAST:     Technique: Precontrast sagittal and axial T1, axial T2, and axial FLAIR and diffusion weighted images of the brain obtained. Then, 10 cc of gadavist was injected intravenously and post gadolinium axial and coronal T1-weighted images obtained.  Noncontrast 3-D time of flight MRA head and postcontrast 3-D time-of-flight MRA head and neck were also performed.     Comparison: CTA head and neck 3/22/2017    Findings:    MRI Brain:     There are numerous foci of T2/FLAIR signal hyperintensity involving the supratentorial white matter as well as in the periventricular white matter and at the callosalseptal interface with outward radiation from  the lateral ventricles. There are additional scattered foci of T2/FLAIR hyperintensity identified within the jose as well as the cerebellum (right hemisphere greater than left). While nonspecific, this configuration and constellation of findings can be seen in the setting of underlying demyelinating process such as multiple sclerosis. Note is made that many of these foci of T2/FLAIR hyperintensity demonstrate corresponding T1 hypointense signal.    There are punctate foci of restricted diffusion involving the right frontal lobe periventricular white matter, right temporal lobe, and left midbrain tectum with corresponding FLAIR hyperintensity. There are foci of increased diffusivity in the right corona radiata and right periatrial white matter. Given the aforementioned white matter findings, these could represent regions of recent demyelination. There is an approximately 0.7 cm enhancing FLAIR hyperintense lesion in the right anterior thalamus which demonstrates no restricted diffusion concerning for focus of active demyelination.    There is no evidence of hydrocephalus. There is generalized cerebral atrophy. There is no evidence of intracranial hemorrhage. The sellar region is unremarkable. The globes appear unremarkable. The major vascular flow voids are patent.    MRA head:    Anterior circulation:  The bilateral distal cervical, yaya, cavernous and supraclinoid segments of the ICA's are patent without evidence of significant focal stenosis or aneurysm. The visualized bilateral anterior and middle cerebral arteries are patent without evidence for significant focal stenosis or aneurysm. There is a left-sided PCOM.    Posterior circulation:  The distal vertebral arteries, basilar artery and posterior cerebral arteries are patent without evidence for significant focal stenosis or aneurysm.    MRA neck: The origins of the right brachycephalic,  left common carotid and left subclavian arteries from the arch are  within normal limits. The origins of the vertebral arteries from the subclavian arteries are within normal limits. The vertebral arteries are unremarkable throughout their course without evidence for focal stenosis or occlusion.    Right carotid: The right common carotid artery, carotid bifurcation and extra-cranial portions of the internal carotid artery are patent without evidence for focal stenosis or occlusion.    Left carotid: The left common carotid artery, carotid bifurcation and extra-cranial portions of the internal carotid artery are patent without evidence for focal stenosis or occlusion.            MRA Neck (Final result)    Abnormal Result time:  05/13/17 06:32:52    Final result by Mikhail Hatch MD (05/13/17 06:32:52)    Impression:         1. Multiple foci of FLAIR hyperintensity in the deep cerebral periventricular white matter in a configuration and distribution which can be seen in the setting of underlying demyelinating process such as multiple sclerosis. There are several punctate foci of restricted diffusion including the left aspect of the midbrain tectum which in light of the aforementioned white matter changes may reflect regions of recent demyelination. Superimposed small acute infarcts would be difficult to exclude, although are felt less likely given the overall constellation of findings. Additionally, there is a 0.7 cm enhancing T2/FLAIR identified in the anterior right thalamus concerning for focus of active demyelination. Clinical correlation with patient history is advised.     2. Unremarkable MRA head or neck without evidence of vascular occlusion.    This report has been flagged in the [facility] medical record.            Electronically signed by: MIKHAIL HATCH  Date:     05/13/17  Time:    06:32     Narrative:    MRI OF THE BRAIN WITHOUT AND WITH IV CONTRAST:     Technique: Precontrast sagittal and axial T1, axial T2, and axial FLAIR and diffusion weighted images of the brain  obtained. Then, 10 cc of gadavist was injected intravenously and post gadolinium axial and coronal T1-weighted images obtained.  Noncontrast 3-D time of flight MRA head and postcontrast 3-D time-of-flight MRA head and neck were also performed.     Comparison: CTA head and neck 3/22/2017    Findings:    MRI Brain:     There are numerous foci of T2/FLAIR signal hyperintensity involving the supratentorial white matter as well as in the periventricular white matter and at the callosalseptal interface with outward radiation from the lateral ventricles. There are additional scattered foci of T2/FLAIR hyperintensity identified within the jose as well as the cerebellum (right hemisphere greater than left). While nonspecific, this configuration and constellation of findings can be seen in the setting of underlying demyelinating process such as multiple sclerosis. Note is made that many of these foci of T2/FLAIR hyperintensity demonstrate corresponding T1 hypointense signal.    There are punctate foci of restricted diffusion involving the right frontal lobe periventricular white matter, right temporal lobe, and left midbrain tectum with corresponding FLAIR hyperintensity. There are foci of increased diffusivity in the right corona radiata and right periatrial white matter. Given the aforementioned white matter findings, these could represent regions of recent demyelination. There is an approximately 0.7 cm enhancing FLAIR hyperintense lesion in the right anterior thalamus which demonstrates no restricted diffusion concerning for focus of active demyelination.    There is no evidence of hydrocephalus. There is generalized cerebral atrophy. There is no evidence of intracranial hemorrhage. The sellar region is unremarkable. The globes appear unremarkable. The major vascular flow voids are patent.    MRA head:    Anterior circulation:  The bilateral distal cervical, yaya, cavernous and supraclinoid segments of the ICA's are  patent without evidence of significant focal stenosis or aneurysm. The visualized bilateral anterior and middle cerebral arteries are patent without evidence for significant focal stenosis or aneurysm. There is a left-sided PCOM.    Posterior circulation:  The distal vertebral arteries, basilar artery and posterior cerebral arteries are patent without evidence for significant focal stenosis or aneurysm.    MRA neck: The origins of the right brachycephalic,  left common carotid and left subclavian arteries from the arch are within normal limits. The origins of the vertebral arteries from the subclavian arteries are within normal limits. The vertebral arteries are unremarkable throughout their course without evidence for focal stenosis or occlusion.    Right carotid: The right common carotid artery, carotid bifurcation and extra-cranial portions of the internal carotid artery are patent without evidence for focal stenosis or occlusion.    Left carotid: The left common carotid artery, carotid bifurcation and extra-cranial portions of the internal carotid artery are patent without evidence for focal stenosis or occlusion.            MRA Brain (Final result)    Abnormal Result time:  05/13/17 06:32:52    Final result by Bryn Hatch MD (05/13/17 06:32:52)    Impression:         1. Multiple foci of FLAIR hyperintensity in the deep cerebral periventricular white matter in a configuration and distribution which can be seen in the setting of underlying demyelinating process such as multiple sclerosis. There are several punctate foci of restricted diffusion including the left aspect of the midbrain tectum which in light of the aforementioned white matter changes may reflect regions of recent demyelination. Superimposed small acute infarcts would be difficult to exclude, although are felt less likely given the overall constellation of findings. Additionally, there is a 0.7 cm enhancing T2/FLAIR identified in the anterior  right thalamus concerning for focus of active demyelination. Clinical correlation with patient history is advised.     2. Unremarkable MRA head or neck without evidence of vascular occlusion.    This report has been flagged in the [facility] medical record.            Electronically signed by: MIKHAIL SUH  Date:     05/13/17  Time:    06:32     Narrative:    MRI OF THE BRAIN WITHOUT AND WITH IV CONTRAST:     Technique: Precontrast sagittal and axial T1, axial T2, and axial FLAIR and diffusion weighted images of the brain obtained. Then, 10 cc of gadavist was injected intravenously and post gadolinium axial and coronal T1-weighted images obtained.  Noncontrast 3-D time of flight MRA head and postcontrast 3-D time-of-flight MRA head and neck were also performed.     Comparison: CTA head and neck 3/22/2017    Findings:    MRI Brain:     There are numerous foci of T2/FLAIR signal hyperintensity involving the supratentorial white matter as well as in the periventricular white matter and at the callosalseptal interface with outward radiation from the lateral ventricles. There are additional scattered foci of T2/FLAIR hyperintensity identified within the jose as well as the cerebellum (right hemisphere greater than left). While nonspecific, this configuration and constellation of findings can be seen in the setting of underlying demyelinating process such as multiple sclerosis. Note is made that many of these foci of T2/FLAIR hyperintensity demonstrate corresponding T1 hypointense signal.    There are punctate foci of restricted diffusion involving the right frontal lobe periventricular white matter, right temporal lobe, and left midbrain tectum with corresponding FLAIR hyperintensity. There are foci of increased diffusivity in the right corona radiata and right periatrial white matter. Given the aforementioned white matter findings, these could represent regions of recent demyelination. There is an approximately 0.7 cm  enhancing FLAIR hyperintense lesion in the right anterior thalamus which demonstrates no restricted diffusion concerning for focus of active demyelination.    There is no evidence of hydrocephalus. There is generalized cerebral atrophy. There is no evidence of intracranial hemorrhage. The sellar region is unremarkable. The globes appear unremarkable. The major vascular flow voids are patent.    MRA head:    Anterior circulation:  The bilateral distal cervical, yaya, cavernous and supraclinoid segments of the ICA's are patent without evidence of significant focal stenosis or aneurysm. The visualized bilateral anterior and middle cerebral arteries are patent without evidence for significant focal stenosis or aneurysm. There is a left-sided PCOM.    Posterior circulation:  The distal vertebral arteries, basilar artery and posterior cerebral arteries are patent without evidence for significant focal stenosis or aneurysm.    MRA neck: The origins of the right brachycephalic,  left common carotid and left subclavian arteries from the arch are within normal limits. The origins of the vertebral arteries from the subclavian arteries are within normal limits. The vertebral arteries are unremarkable throughout their course without evidence for focal stenosis or occlusion.    Right carotid: The right common carotid artery, carotid bifurcation and extra-cranial portions of the internal carotid artery are patent without evidence for focal stenosis or occlusion.    Left carotid: The left common carotid artery, carotid bifurcation and extra-cranial portions of the internal carotid artery are patent without evidence for focal stenosis or occlusion.            X-Ray Chest AP Portable (Final result) Result time:  05/13/17 02:07:40    Final result by Evan Flores MD (05/13/17 02:07:40)    Impression:      No acute cardiopulmonary process.  ______________________________________     Electronically signed by resident: EVAN FLORES  MD  Date:     05/13/17  Time:    02:05            As the supervising and teaching physician, I personally reviewed the images and resident's interpretation and I agree with the findings.          Electronically signed by: TORSTEN KOLB MD  Date:     05/13/17  Time:    02:07     Narrative:    Chest Radiograph    Time of Procedure: 05/13/17 01:53:00  Accession # [id number]    Comparison: None    Number of views: 1    Findings:  Lines and tubes: None present.     Lungs and pleural space: Well aerated. No focal consolidation, pleural effusion or pneumothorax.     Mediastinum: Structures are midline. The cardiomediastinal silhouette is normal in size. There is calcification of the aortic arch.    Osseous structures: No significant abnormality.     Upper abdomen: Normal.              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  Started 5/13 before steroids.        Type 2 diabetes mellitus with diabetic polyneuropathy, with long-term current use of insulin [E11.42, Z79.4]  Not Applicable    Demyelinating changes in brain [G37.9]  Yes     By mri.  Several active lesions, many old.      Ataxia [R27.0]  Yes    Amblyopia [H53.009]  Yes    Type 2 diabetes mellitus with hyperglycemia, with long-term current use of insulin [E11.65, Z79.4]  Not Applicable    Obesity [E66.9]  Yes     Chronic    Sleep  apnea [G47.30]  Yes     Chronic    Essential hypertension [I10]  Yes     Chronic    Mixed hyperlipidemia [E78.2]  Yes     Chronic      Resolved Hospital Problems    Diagnosis Date Resolved POA   No resolved problems to display.       ASSESSMENT & PLAN:  Internuclear ophthalmoplegia of left eye  - patient presented with double vision; seen in ED by Vascular Neurology  - MRI Brain 5/13: multiple foci of FLAIR hypersensitivity in the white matter concerning for demyelinating process  - MRA Head/Neck 5/13: unremarkable; no signs of ischemia  - TSH 5/13: 1.248  - 2D echo 5/13: EF 65; normal read  - Neurology consulted; started work up for MS  - continue solumedrol 1g IV QD for 3 doses (ends 5/16)  - LP 5/14: in process  - REYMUNDO, NMO, ACE ordered for MS mimics  - B1 and B12 levels ordered     Type 2 diabetes, uncontrolled, with neuropathy  - stroke risk factor; glucose uncontrolled  - last HgbA1c 11.4 5/13  - home regimen consists of metformin 1000 mg BID; detemir 42U QHS  - hyperglycemia 2/2 IV steroids with chronic poor baseline control  - increased detemir to 26U BID; aspart 18U TIDWM  - continue moderate correction dose SSI      Mixed hyperlipidemia  - continue atorvastatin 80 mg QD      Essential hypertension  - patient elevated on admission; will continue to monitor  - continue home valsartan      Obesity  - stroke risk factor. Encourage proper diet, regular exercise  - PT/OT ordered    PT/OT: recommend outpatient PT/OT  Diet: diabetic 2k  DVT PPx: SCD/ELIZABETH, can start chemical anticoagulation on 5/16  Code Status: Full Code    Dispo:  Pending workup of JAYDEN with neurology.  Pending better diabetes control.     Patient seen and discussed with Dr. Matilda Rivas MD during rounds.    Guanaco Story MD  PGY-1  Pager: [phone]

## 2017-05-15 NOTE — SUBJECTIVE & OBJECTIVE
"  Subjective:     Interval History:   LP yesterday.  Patient disoriented. Wanders off.  Removed two IVs and a telemetry box, and urinated on the floor OVN x3.    From Consult 5/13/17:  58 yo M that I'm asked to see for possible MS.  Wife at bedside.  Came to ER today because of acute onset of diplopia Wednesday morning as well as ataxia "like i'm drunk."  Mri suggestive of demyelinating lesions (old and new).  Says he was told he had a stroke 16 years ago, but really "no one knew what was going on."  No symptoms since.  Since being in hospital, he has gotten confused and wandered out of obs unit looking for wife.  He is oriented currently.      Current Facility-Administered Medications   Medication Dose Route Frequency Provider Last Rate Last Dose    0.9%  NaCl infusion   Intravenous Continuous Emily Rainey  mL/hr at 05/15/17 0946      acetaminophen tablet 650 mg  650 mg Oral Q6H PRN Fabio Becerra MD        aspirin EC tablet 81 mg  81 mg Oral Daily Georgette Leon, NP   81 mg at 05/15/17 0825    atorvastatin tablet 80 mg  80 mg Oral Daily Georgette Leon, NP   80 mg at 05/15/17 0824    dextrose 50% injection 12.5 g  12.5 g Intravenous PRN Georgette Leon, NP        dextrose 50% injection 25 g  25 g Intravenous PRN Georgette Leon, NP        diltiaZEM 24 hr capsule 240 mg  240 mg Oral Daily Georgette Leon, NP   240 mg at 05/15/17 0825    glucagon (human recombinant) injection 1 mg  1 mg Intramuscular PRN Georgette Leon, NP        glucose chewable tablet 16 g  16 g Oral PRN Georgette Leon, NP        glucose chewable tablet 24 g  24 g Oral PRN Georgette Leon, NP        insulin aspart pen 1-10 Units  1-10 Units Subcutaneous QID (AC + HS) PRN Georgette Leon, NP   8 Units at 05/15/17 1244    insulin aspart pen 18 Units  18 Units Subcutaneous TIDWM Guanaco Story MD   18 Units at 05/15/17 1244    insulin detemir pen 26 Units  26 Units Subcutaneous BID Guanaco Ramirez" MD Jez   26 Units at 05/15/17 0825    lidocaine (PF) 10 mg/ml (1%) injection 10 mg  1 mL Other Once Preston Quan MD        methylPREDNISolone sodium succinate (SOLU-MEDROL) 1,000 mg in dextrose 5 % 100 mL IVPB   Intravenous Daily Emily Rainey MD        ramelteon tablet 8 mg  8 mg Oral Nightly PRN [place] Satish Dumont MD   8 mg at 05/14/17 2334    sertraline tablet 150 mg  150 mg Oral Daily Georgette Leon, NP   150 mg at 05/15/17 0825    sodium chloride 0.9% bolus 500 mL  500 mL Intravenous Continuous PRN Georgette Leon, NP        sodium chloride 0.9% flush 3 mL  3 mL Intravenous Q8H Georgette Leon, NP   3 mL at 05/15/17 0528    [START ON 5/16/2017] valsartan tablet 320 mg  320 mg Oral Daily Guanaco Story MD           Review of Systems   Eyes: Positive for visual disturbance.   Respiratory: Negative for shortness of breath.    Cardiovascular: Negative for chest pain.   Gastrointestinal: Negative for constipation, diarrhea, nausea and vomiting.   Neurological: Negative for weakness.   Psychiatric/Behavioral: Positive for confusion. Negative for agitation. The patient is not nervous/anxious.      Objective:     Vital Signs (Most Recent):  Temp: 97.4 °F (36.3 °C) (05/15/17 1536)  Pulse: 90 (05/15/17 1536)  Resp: 18 (05/15/17 1536)  BP: (!) 141/75 (05/15/17 1536)  SpO2: 95 % (05/15/17 1536) Vital Signs (24h Range):  Temp:  [97.2 °F (36.2 °C)-98.1 °F (36.7 °C)] 97.4 °F (36.3 °C)  Pulse:  [68-95] 90  Resp:  [16-22] 18  SpO2:  [92 %-98 %] 95 %  BP: (135-164)/(65-87) 141/75     Weight: 93.6 kg (206 lb 5.6 oz)  Body mass index is 29.61 kg/(m^2).    Physical Exam        General appearance: Well nourished, well developed, no acute distress.   Cardiovascular: pedal pulses 2, no edema or cyanosis, heart regular rate and rhythym, no carotid bruits.   -------------------------------------------------------------  Facial Expression: normal   Affect: full   Orientation to time & place:  Oriented to time, place, person and situation   Attention & concentration: Normal attention span and concentration   Memory: Recent and remote memory intact  Language: Spontaneous, fluent; able to repeat and name objects    Fund of knowledge: Aware of current events   Speech: normal (not dysarthric)  -------------------------------------------------------  Cranial nerves: normal visual acuity, visual fields full, optic discs not well visualized due small pupils, pupils equal round and reactive, extraocular movements intact when separately tested, but clear JAYDEN, facial sensation intact, face symmetrical, hearing intact to whisper, palate raises midline, shoulder shrug strength normal, tongue protrudes midline.   -------------------------------------------------------  Musculoskeletal  Muscle strength: 5/5 in BUE.  4/5 in LLE, 4+/5 RLE   No pronator drift  Sensation: Intact to light touch in all extremities   --------------------------------------------------------------  Cerebellar and Coordination  Gait: normal, able to tandem without difficulty   Finger-nose: +dysmetria, mild, on left  --------------------------------------------------------------  MOVEMENT DISORDERS FOCUSED EXAM  Abnormality of movement (bradykinesia, hyperkinesia) present? No   Tremor present? Yes.  Fine tremor noted in BUE   Posture: normal  Postural stability: no Romberg    Significant Labs:   CBC:     Recent Labs  Lab 05/14/17  0456 05/15/17  0348   WBC 9.48 9.79   HGB 13.5* 13.7*   HCT 38.0* 39.2*    182     CMP:     Recent Labs  Lab 05/14/17  0456 05/14/17  1736 05/15/17  0348   * 496* 447*    135* 138   K 4.5 4.3 4.0    105 104   CO2 19* 18* 22*   BUN 19 22* 21*   CREATININE 1.2 1.5* 1.3   CALCIUM 9.0 8.9 9.2   PROT 6.9  --  7.0   ALBUMIN 3.4*  --  3.5   BILITOT 1.1*  --  0.7   ALKPHOS 77  --  80   AST 30  --  23   ALT 34  --  33   ANIONGAP 12 12 12   EGFRNONAA >60.0 50.2* 59.7*     Inflammatory Markers: No results  for input(s): SEDRATE, CRP, PROCAL in the last 48 hours.    Significant Imaging: I have reviewed all pertinent imaging results/findings within the past 24 hours.     MRI brain 5/13/17:  Multiple foci of FLAIR hyperintensity in the deep cerebral periventricular white matter in a configuration and distribution which can be seen in the setting of underlying demyelinating process such as multiple sclerosis. There are several punctate foci of restricted diffusion including the left aspect of the midbrain tectum which in light of the aforementioned white matter changes may reflect regions of recent demyelination. Superimposed small acute infarcts would be difficult to exclude, although are felt less likely given the overall constellation of findings. Additionally, there is a 0.7 cm enhancing T2/FLAIR identified in the anterior right thalamus concerning for focus of active demyelination.           Also old lesions in corpus collosum

## 2017-05-15 NOTE — PLAN OF CARE
Problem: Patient Care Overview  Goal: Plan of Care Review  Outcome: Ongoing (interventions implemented as appropriate)  Pt free of fall this shift-high fall risk, bed alarm on, bed low position, call light within reach, wheels locked, instructed to call for help when want to get oob. Pt verbalized understandings. No restraints applied, no behaviors present that required restraints. No skin breakdown noted. Denied pain. Cbg monitored and insulin administered per MAR ordered. Pt AAOx4 but was told in report that pt had episode of confusion previous night. Afebrile. No acute distress noted. Vss. Will monitor pt.

## 2017-05-15 NOTE — PT/OT/SLP PROGRESS
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  Pt agreeable to physical therapy.    Pain Ratin/10  Pain Rating Post-Intervention: 0/10    Objective:   Patient found with: telemetry (PIV disconnected prior to gait training)   Found HOB elevated, spouse, Bekah present.     Functional Mobility:  Bed Mobility:   Rolling/Turning to Left: Independent  Rolling/Turning Right: Independent  Scooting/Bridging: Independent  Supine to Sit: Independent (HOB elevated)  Sit to Supine: Independent (HOB elevated)    Transfers:  Sit <> Stand Assistance: Supervision (x 6 trials EOB)  Sit <> Stand Assistive Device: No Assistive Device, Rolling Walker    Gait:   Gait Distance: > 400 feet (locomotion on unit), unpredictable lateral sway, BUE support (L HHA, R hand on spouses shoulder), no overt LOB  Assistance 1: Contact Guard Assistance  Gait Assistive Device: Hand held assist  Gait Pattern: reciprocal  Gait Deviation(s): decreased justin, decreased weight-shifting ability, decreased step length, increased stride width, foot flat, lateral lean    Stairs:  Not performed today 2/2 unpredictable lateral sway.     Balance:   Static Sit: GOOD-: Takes MODERATE challenges from all directions but inconsistently  Dynamic Sit: GOOD-: Maintains balance through MODERATE excursions of active trunk movement,     Static Stand: FAIR+: Takes MINIMAL challenges from all  directions  Dynamic stand: FAIR: Needs CONTACT GUARD during gait     Therapeutic Activities and Exercises:  · Donned gown around back.   · Pt instructed in and performed BLE therex in sitting, min a for safety, no BUE support on the bed to promote core strength:  LAQ, hip flexion, hip flexion with rotation, hip flexion with knee extension and hip abd/add  · Pt instructed in and performed BLE therex in standing, CGA for safety, BUE support on windowsill during gait training:   Hip flexion, mini squats with glute set with stand, and heel raises   · Pt educated to perform therex sitting at the EOB with his family there for safety. Pt and spouse verbalized understanding.   · Bed alarm turned on at the end of therapy.   · IV pole disconnected by nsg for gait training.   · Pt with soiled brief during therapy. Min A for brief change.    AM-PAC 6 CLICK MOBILITY  How much help from another person does this patient currently need?   1 = Unable, Total/Dependent Assistance  2 = A lot, Maximum/Moderate Assistance  3 = A little, Minimum/Contact Guard/Supervision  4 = None, Modified [place]/Independent    Turning over in bed (including adjusting bedclothes, sheets and blankets)?: 4  Sitting down on and standing up from a chair with arms (e.g., wheelchair, bedside commode, etc.): 4  Moving from lying on back to sitting on the side of the bed?: 4  Moving to and from a bed to a chair (including a wheelchair)?: 4  Need to walk in hospital room?: 3  Climbing 3-5 steps with a railing?: 3  Total Score: 22    AM-PAC Raw Score CMS G-Code Modifier Level of Impairment Assistance   6 % Total / Unable   7 - 9 CM 80 - 100% Maximal Assist   10 - 14 CL 60 - 80% Moderate Assist   15 - 19 CK 40 - 60% Moderate Assist   20 - 22 CJ 20 - 40% Minimal Assist   23 CI 1-20% SBA / CGA   24 CH 0% Independent/ Mod I     Patient left HOB elevated with all lines intact, call button in reach, bed alarm on and spouse present.    Assessment:  Bessy ACEVEDO  Enrique is a 57 y.o. male with a medical diagnosis of Internuclear ophthalmoplegia of left eye and presents with the rehab identified problem list below. He was engaged with therapy today and able to follow commands. The patient has increased sway with gait, but walks better and feels more comfortable with BUE support (see above). The patient tolerated treatment very well and would continue to benefit from skilled PT to address the deficits in his plan of care.     Rehab identified problem list/impairments: Rehab identified problem list/impairments: impaired balance, decreased safety awareness, impaired self care skills, decreased coordination, impaired functional mobilty, gait instability, impaired fine motor, impaired coordination, visual deficits, impaired cognition    Rehab potential is good.    Activity tolerance: Good    Discharge recommendations: Discharge Facility/Level Of Care Needs: outpatient OT     Barriers to discharge: Barriers to Discharge: None    Equipment recommendations: Equipment Needed After Discharge: shower chair     GOALS:   Physical Therapy Goals        Problem: Physical Therapy Goal    Goal Priority Disciplines Outcome Goal Variances Interventions   Physical Therapy Goal     PT/OT, PT Ongoing (interventions implemented as appropriate)     Description:  Goals to be met by: 2017     Patient will increase functional independence with mobility by performin. Sit to stand transfer with [place]  2. Bed to chair transfer with Supervision using AD or No AD  3. Gait x150 feet with Supervision using AD or No AD  4. Ascend/descend x1 flight of stairs with left Handrails Supervision  5. Stand for x10 minutes with Stand-by Assistance while performing dynamic standing balance tasks  6. Lower extremity exercise program x15 reps per handout, with supervision                PLAN:    Patient to be seen 6 x/week  to address the above listed problems via gait training, therapeutic activities,  therapeutic exercises, neuromuscular re-education  Plan of Care expires: 06/12/17  Plan of Care reviewed with: patient, spouse         Dorota Baird, PTA  05/15/2017

## 2017-05-15 NOTE — ASSESSMENT & PLAN NOTE
Intermittent confusion, disorientation, which started 5/13 before steroids.  - B12 wnl.  F/u B1  - Check SPEP, UPEP, RPR

## 2017-05-15 NOTE — PT/OT/SLP PROGRESS
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  Patient:  "They are thinking multiple sclerosis.They did a spinal tap and we're waiting for results.  My vision is not really good; it's blurred.  The medicines I am taking cause mass problems."  Pain Ratin/10   Pain Rating Post-Intervention: 0/10    Objective:  Patient found with: telemetry, peripheral IV   Wife asleep at bedside.     Functional Mobility:  Bed Mobility:  Rolling/Turning to Left: Modified independent  Rolling/Turning Right: Modified independent  Scooting/Bridging: Modified Independent  Supine to Sit: Modified Independent  Sit to Supine: Modified Independent    Transfers:   Sit <> Stand Assistance: Contact Guard Assistance  Sit <> Stand Assistive Device: No Assistive Device  Bed <> Chair Technique: Stand Pivot  Bed <> Chair Transfer Assistance: Contact Guard Assistance    Activities of Daily Living:  UE Dressing Level of Assistance: Contact guard (while standing to gather clothing)  LE Dressing Level of Assistance: Contact guard (assistance for standing balance)  Grooming Position: Standing  Grooming Level of Assistance: Contact guard assistance     Additional Treatment:   Patient education provided on role of OT and need for outpt OT upon discharge. Patient education provided on fall prevention, visual scanning and ADLs. Patient verbalizing understanding via teach back method. Continued education, patient/ family training recommended. Patient's functional status and " disposition recommendation discussed with patient. Patient alert and oriented x 3; able to follow 4/4 one step commands. Patient attentive and interactive throughout the session.   White board updated in patient's room. OT asked if there were any other questions; patient/ family had no further questions.       AM-PAC 6 CLICK ADL   How much help from another person does this patient currently need?   1 = Unable, Total/Dependent Assistance  2 = A lot, Maximum/Moderate Assistance  3 = A little, Minimum/Contact Guard/Supervision  4 = None, Modified [place]/Independent    Putting on and taking off regular lower body clothing? : 3  Bathing (including washing, rinsing, drying)?: 3  Toileting, which includes using toilet, bedpan, or urinal? : 3  Putting on and taking off regular upper body clothing?: 3  Taking care of personal grooming such as brushing teeth?: 3  Eating meals?: 3  Total Score: 18     AM-PAC Raw Score CMS G-Code Modifier Level of Impairment Assistance   6 % Total / Unable   7 - 9 CM 80 - 100% Maximal Assist   10 - 14 CL 60 - 80% Moderate Assist   15 - 19 CK 40 - 60% Moderate Assist   20 - 22 CJ 20 - 40% Minimal Assist   23 CI 1-20% SBA / CGA   24 CH 0% Independent/ Mod I     Patient left supine with all lines intact and call button in reach    Assessment:  Bessy Nunez is a 57 y.o. male with a medical diagnosis of Diplopia and presents with performance deficits of physical skills including impaired balance, mobility, gross motor coordination, sensation (specifically viisual); demonstrating performance deficits of cognitive skills including impaired problem solving, and memory all resulting in inability organizing occupational performance in a timely and safe manner. These performance deficits have resulted in activity limitations including but not limited to: transfers, ascending/ descending stairs, walking short and long distances, walking around obstacles, transitional movement patterns  (kneeling, bending); upper body dressing, lower body dressing, brushing teeth, toileting, bathing, carrying objects, cutting grass and driving. Patient's role as , father and independent caretaker for self has been affected. Patient will benefit from skilled OT services to maximize level of independence with self-care skills and functional mobility. Will benefit from outpt OT.    Rehab identified problem list/impairments: Rehab identified problem list/impairments: impaired balance, decreased safety awareness, impaired self care skills, decreased coordination, impaired functional mobilty, gait instability, impaired fine motor, impaired coordination, visual deficits    Rehab potential is good.    Activity tolerance: Good    Discharge recommendations: Discharge Facility/Level Of Care Needs: outpatient OT     Barriers to discharge: Barriers to Discharge: None    Equipment recommendations: shower chair     GOALS:   Occupational Therapy Goals        Problem: Occupational Therapy Goal    Goal Priority Disciplines Outcome Interventions   Occupational Therapy Goal     OT, PT/OT     Description:  Goals set 5/13 to be addressed for 7 days with expiration date, 5/20:  Patient will increase functional independence with ADLs by performing:  Patient will demonstrate stand pivot transfers with modified independence.   Not met  Patient will demonstrate grooming while standing with modified independence.   Not met  Patient will demonstrate upper body dressing with modified independence.   Not met  Patient will demonstrate lower body dressing with modified independence.   Not met  Patient will demonstrate toileting with modified independence.   Not met  Patient will demonstrate bathing while seated EOB with modified independence.   Not met  Patient's family / caregiver will demonstrate independence and safety with assisting patient with self-care skills and functional mobility.     Not met  Patient and/or patient's family will  verbalize understanding of stroke prevention guidelines, personal risk factors and stroke warning signs via teachback method.  Not met                     Plan:  Patient to be seen 6 x/week to address the above listed problems via self-care/home management, therapeutic activities, therapeutic exercises, neuromuscular re-education, cognitive retraining, sensory integration  Plan of Care expires: 06/11/17  Plan of Care reviewed with: patient, spouse         Guillermina COLTON Avelar  05/15/2017

## 2017-05-16 LAB
ACE SERPL-CCNC: 23 U/L
ALBUMIN CSF-MCNC: 51.4 MG/DL
ALBUMIN CSF-MCNC: 51.4 MG/DL
ALBUMIN SERPL-MCNC: 3820 MG/DL
ALBUMIN SERPL-MCNC: 3820 MG/DL
IGG CSF-MCNC: 6.6 MG/DL
IGG CSF-MCNC: 6.6 MG/DL
IGG SERPL-MCNC: 891 MG/DL
IGG SERPL-MCNC: 891 MG/DL
IGG SYNTH RATE SER+CSF CALC-MRATE: 5.49 MG/24 H
IGG SYNTH RATE SER+CSF CALC-MRATE: 5.49 MG/24 H
IGG/ALB CLEAR SER+CSF-RTO: 0.57
IGG/ALB CLEAR SER+CSF-RTO: 0.57
IGG/ALB CSF: 0.13 {RATIO}
IGG/ALB CSF: 0.13 {RATIO}
IGG/ALB SER: 0.23 {RATIO}
IGG/ALB SER: 0.23 {RATIO}
OLIGOCLONAL BANDS CSF ELPH-IMP: 0 BANDS
OLIGOCLONAL BANDS SERPL: 0 BANDS
OLIGOCLONAL BANDS SERPL: 0 BANDS
POCT GLUCOSE: 355 MG/DL (ref 70–110)
POCT GLUCOSE: 363 MG/DL (ref 70–110)
POCT GLUCOSE: 364 MG/DL (ref 70–110)
POCT GLUCOSE: 400 MG/DL (ref 70–110)

## 2017-05-16 PROCEDURE — 25000003 PHARM REV CODE 250: Performed by: INTERNAL MEDICINE

## 2017-05-16 PROCEDURE — 97112 NEUROMUSCULAR REEDUCATION: CPT

## 2017-05-16 PROCEDURE — 99233 SBSQ HOSP IP/OBS HIGH 50: CPT | Mod: ,,, | Performed by: HOSPITALIST

## 2017-05-16 PROCEDURE — 63600175 PHARM REV CODE 636 W HCPCS: Performed by: INTERNAL MEDICINE

## 2017-05-16 PROCEDURE — 25000003 PHARM REV CODE 250: Performed by: NURSE PRACTITIONER

## 2017-05-16 PROCEDURE — 97535 SELF CARE MNGMENT TRAINING: CPT

## 2017-05-16 PROCEDURE — 99233 SBSQ HOSP IP/OBS HIGH 50: CPT | Mod: ,,, | Performed by: PSYCHIATRY & NEUROLOGY

## 2017-05-16 PROCEDURE — 25000003 PHARM REV CODE 250: Performed by: STUDENT IN AN ORGANIZED HEALTH CARE EDUCATION/TRAINING PROGRAM

## 2017-05-16 PROCEDURE — 11000001 HC ACUTE MED/SURG PRIVATE ROOM

## 2017-05-16 RX ORDER — QUETIAPINE FUMARATE 25 MG/1
25 TABLET, FILM COATED ORAL NIGHTLY PRN
Status: DISCONTINUED | OUTPATIENT
Start: 2017-05-16 | End: 2017-05-18 | Stop reason: HOSPADM

## 2017-05-16 RX ADMIN — DILTIAZEM HYDROCHLORIDE 240 MG: 240 CAPSULE, COATED, EXTENDED RELEASE ORAL at 08:05

## 2017-05-16 RX ADMIN — SERTRALINE HYDROCHLORIDE 150 MG: 50 TABLET ORAL at 08:05

## 2017-05-16 RX ADMIN — SODIUM CHLORIDE: 0.9 INJECTION, SOLUTION INTRAVENOUS at 09:05

## 2017-05-16 RX ADMIN — VALSARTAN 320 MG: 160 TABLET, FILM COATED ORAL at 08:05

## 2017-05-16 RX ADMIN — DEXTROSE: 50 INJECTION, SOLUTION INTRAVENOUS at 08:05

## 2017-05-16 RX ADMIN — INSULIN ASPART 18 UNITS: 100 INJECTION, SOLUTION INTRAVENOUS; SUBCUTANEOUS at 08:05

## 2017-05-16 RX ADMIN — SODIUM CHLORIDE: 0.9 INJECTION, SOLUTION INTRAVENOUS at 04:05

## 2017-05-16 RX ADMIN — INSULIN ASPART 18 UNITS: 100 INJECTION, SOLUTION INTRAVENOUS; SUBCUTANEOUS at 12:05

## 2017-05-16 RX ADMIN — DEXTROSE: 50 INJECTION, SOLUTION INTRAVENOUS at 05:05

## 2017-05-16 RX ADMIN — INSULIN ASPART 18 UNITS: 100 INJECTION, SOLUTION INTRAVENOUS; SUBCUTANEOUS at 05:05

## 2017-05-16 RX ADMIN — INSULIN ASPART 10 UNITS: 100 INJECTION, SOLUTION INTRAVENOUS; SUBCUTANEOUS at 08:05

## 2017-05-16 RX ADMIN — ASPIRIN 81 MG: 81 TABLET, COATED ORAL at 08:05

## 2017-05-16 RX ADMIN — INSULIN ASPART 10 UNITS: 100 INJECTION, SOLUTION INTRAVENOUS; SUBCUTANEOUS at 12:05

## 2017-05-16 RX ADMIN — INSULIN ASPART 5 UNITS: 100 INJECTION, SOLUTION INTRAVENOUS; SUBCUTANEOUS at 08:05

## 2017-05-16 RX ADMIN — Medication 3 ML: at 08:05

## 2017-05-16 RX ADMIN — Medication 3 ML: at 03:05

## 2017-05-16 RX ADMIN — Medication 3 ML: at 05:05

## 2017-05-16 RX ADMIN — INSULIN ASPART 10 UNITS: 100 INJECTION, SOLUTION INTRAVENOUS; SUBCUTANEOUS at 05:05

## 2017-05-16 RX ADMIN — SODIUM CHLORIDE: 0.9 INJECTION, SOLUTION INTRAVENOUS at 12:05

## 2017-05-16 RX ADMIN — ATORVASTATIN CALCIUM 80 MG: 20 TABLET, FILM COATED ORAL at 08:05

## 2017-05-16 NOTE — PT/OT/SLP PROGRESS
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  Patient:  "When I do this, I can tell how much it is affecting me."  Wife:  "He used to have pretty good handwriting."  Pain Ratin/10   Pain Rating Post-Intervention: 0/10    Objective:  Patient found with: telemetry, peripheral IV  Family present.    Functional Mobility:  Bed Mobility:  Rolling/Turning to Left: Modified independent  Rolling/Turning Right: Modified independent  Scooting/Bridging: Modified Independent  Supine to Sit: Modified Independent  Sit to Supine: Modified Independent    Transfers:   Sit <> Stand Assistance: Contact Guard Assistance  Sit <> Stand Assistive Device: No Assistive Device  Bed <> Chair Technique: Stand Pivot  Bed <> Chair Transfer Assistance: Contact Guard Assistance    Activities of Daily Living:  UE Dressing Level of Assistance: Contact guard (for standing balance to retreive clothing)  LE Dressing Level of Assistance: Minimum assistance (for standing balance)  Grooming Position: Standing  Grooming Level of Assistance: Contact guard assistance    Additional Treatment:   Patient education provided on role of OT and need for outpt OT upon discharge. Patient education provided on fall prevention, handwriting, visual scanning and ADLs. Patient verbalizing understanding via teach back method. Continued education, patient/ family training recommended. Patient's functional status and disposition recommendation discussed " with patient. Patient alert and oriented x 3; able to follow 4/4 one step commands.  Addressed tracing exercises in preparation for handwriting.  Addressed core stabilization.  Min-Mod assist required during unilateral stance; ws to the right.  Patient attentive and interactive throughout the session. White board updated in patient's room. OT asked if there were any other questions; patient/ family had no further questions.    AM-PAC 6 CLICK ADL   How much help from another person does this patient currently need?   1 = Unable, Total/Dependent Assistance  2 = A lot, Maximum/Moderate Assistance  3 = A little, Minimum/Contact Guard/Supervision  4 = None, Modified [place]/Independent    Putting on and taking off regular lower body clothing? : 3  Bathing (including washing, rinsing, drying)?: 3  Toileting, which includes using toilet, bedpan, or urinal? : 3  Putting on and taking off regular upper body clothing?: 3  Taking care of personal grooming such as brushing teeth?: 3  Eating meals?: 3  Total Score: 18     AM-PAC Raw Score CMS G-Code Modifier Level of Impairment Assistance   6 % Total / Unable   7 - 9 CM 80 - 100% Maximal Assist   10 - 14 CL 60 - 80% Moderate Assist   15 - 19 CK 40 - 60% Moderate Assist   20 - 22 CJ 20 - 40% Minimal Assist   23 CI 1-20% SBA / CGA   24 CH 0% Independent/ Mod I     Patient left supine with all lines intact and call button in reach    Assessment:  Bessy Nunez is a 57 y.o. male with a medical diagnosis of Diplopia and presents with performance deficits of physical skills including impaired balance, mobility, gross motor coordination, sensation (specifically viisual); demonstrating performance deficits of cognitive skills including impaired problem solving, and memory all resulting in inability organizing occupational performance in a timely and safe manner. These performance deficits have resulted in activity limitations including but not limited to: transfers,  ascending/ descending stairs, walking short and long distances, walking around obstacles, transitional movement patterns (kneeling, bending); upper body dressing, lower body dressing, brushing teeth, toileting, bathing, carrying objects, cutting grass and driving. Patient's role as , father and independent caretaker for self has been affected. Patient will benefit from skilled OT services to maximize level of independence with self-care skills and functional mobility. Will benefit from outpt OT.    Rehab identified problem list/impairments: Rehab identified problem list/impairments: weakness, impaired functional mobilty, impaired cognition, impaired endurance, gait instability, decreased coordination, impaired self care skills, visual deficits, impaired balance, decreased upper extremity function, decreased lower extremity function, decreased ROM, abnormal tone, decreased safety awareness, impaired coordination    Rehab potential is good.    Activity tolerance: Good    Discharge recommendations: Discharge Facility/Level Of Care Needs: outpatient OT     Barriers to discharge: Barriers to Discharge: Inaccessible home environment, Decreased caregiver support    Equipment recommendations: none     GOALS:   Occupational Therapy Goals        Problem: Occupational Therapy Goal    Goal Priority Disciplines Outcome Interventions   Occupational Therapy Goal     OT, PT/OT     Description:  Goals set 5/13 to be addressed for 7 days with expiration date, 5/20:  Patient will increase functional independence with ADLs by performing:  Patient will demonstrate stand pivot transfers with modified independence.   Not met  Patient will demonstrate grooming while standing with modified independence.   Not met  Patient will demonstrate upper body dressing with modified independence.   Not met  Patient will demonstrate lower body dressing with modified independence.   Not met  Patient will demonstrate toileting with modified  independence.   Not met  Patient will demonstrate bathing while seated EOB with modified independence.   Not met  Patient's family / caregiver will demonstrate independence and safety with assisting patient with self-care skills and functional mobility.     Not met  Patient and/or patient's family will verbalize understanding of stroke prevention guidelines, personal risk factors and stroke warning signs via teachback method.  Not met                     Plan:  Patient to be seen 6 x/week to address the above listed problems via self-care/home management, neuromuscular re-education, cognitive retraining, therapeutic activities, therapeutic exercises  Plan of Care expires: 06/11/17  Plan of Care reviewed with: patient, spouse         Guillermina COLTON Avelar  05/16/2017

## 2017-05-16 NOTE — ASSESSMENT & PLAN NOTE
Acute diplopia (2/2 left JAYDEN) with new, enhancing lesion in right thalamus, which is radiographically consistent with demyelinating disease    5/13 started steroids  5/14 LP    -> continue solumedrol 1000mg daily for total of 5 days of 1g daily, or 500mg x1 followed by 1g daily x5 days

## 2017-05-16 NOTE — PROGRESS NOTES
"[facility]  Neurology  Progress Note    Patient Name: Bessy Nunez  MRN: [id number]  Admission Date: 5/13/2017  Hospital Length of Stay: 2 days  Code Status: Full Code   Attending Provider: Elvira Hendricks MD  Primary Care Physician: Edgar Nova MD   Principal Problem:Internuclear ophthalmoplegia of left eye      Subjective:     Interval History:   Still confused, but improving.    From Consult 5/13/17:  56 yo M that I'm asked to see for possible MS.  Wife at bedside.  Came to ER today because of acute onset of diplopia Wednesday morning as well as ataxia "like i'm drunk."  Mri suggestive of demyelinating lesions (old and new).  Says he was told he had a stroke 16 years ago, but really "no one knew what was going on."  No symptoms since.  Since being in hospital, he has gotten confused and wandered out of obs unit looking for wife.  He is oriented currently.      Current Facility-Administered Medications   Medication Dose Route Frequency Provider Last Rate Last Dose    0.9%  NaCl infusion   Intravenous Continuous Emily Rainey  mL/hr at 05/16/17 0901      acetaminophen tablet 650 mg  650 mg Oral Q6H PRN Fabio Becerra MD        aspirin EC tablet 81 mg  81 mg Oral Daily Georgette M. Carolyn, NP   81 mg at 05/16/17 0854    atorvastatin tablet 80 mg  80 mg Oral Daily Georgette M. Carolyn, NP   80 mg at 05/16/17 0854    dextrose 50% injection 12.5 g  12.5 g Intravenous PRN Georgette M. Carolyn, NP        dextrose 50% injection 25 g  25 g Intravenous PRN Georgette ESCAMILLA. Carolyn, NP        diltiaZEM 24 hr capsule 240 mg  240 mg Oral Daily Georgette M. Carolyn, NP   240 mg at 05/16/17 0855    glucagon (human recombinant) injection 1 mg  1 mg Intramuscular PRN Georgette M. Carolyn, NP        glucose chewable tablet 16 g  16 g Oral PRN Georgette M. Carolyn, NP        glucose chewable tablet 24 g  24 g Oral PRN Georgette M. Carolyn, NP        insulin aspart pen 1-10 Units  1-10 Units Subcutaneous QID (AC + " HS) PRN Georgette Leon NP   10 Units at 05/16/17 1237    insulin aspart pen 18 Units  18 Units Subcutaneous TIDWM Guanaco Story MD   18 Units at 05/16/17 1237    insulin detemir pen 26 Units  26 Units Subcutaneous BID Guanaco Story MD   26 Units at 05/16/17 0856    lidocaine (PF) 10 mg/ml (1%) injection 10 mg  1 mL Other Once [place] Mary Beth Quan MD        methylPREDNISolone sodium succinate (SOLU-MEDROL) 1,000 mg in dextrose 5 % 100 mL IVPB   Intravenous Daily DRubén Hall MD        ramelteon tablet 8 mg  8 mg Oral Nightly PRN Anselmo Dumont MD   8 mg at 05/15/17 2058    sertraline tablet 150 mg  150 mg Oral Daily Georgette Leon NP   150 mg at 05/16/17 0857    sodium chloride 0.9% bolus 500 mL  500 mL Intravenous Continuous PRN Georgette Leon NP        sodium chloride 0.9% flush 3 mL  3 mL Intravenous Q8H Georgette Leon NP   3 mL at 05/16/17 0523    valsartan tablet 320 mg  320 mg Oral Daily Guanaco Story MD   320 mg at 05/16/17 0857       Review of Systems   Eyes: Positive for visual disturbance.   Respiratory: Negative for shortness of breath.    Cardiovascular: Negative for chest pain.   Gastrointestinal: Negative for constipation, diarrhea, nausea and vomiting.   Neurological: Negative for weakness.   Psychiatric/Behavioral: Positive for confusion. Negative for agitation. The patient is not nervous/anxious.      Objective:     Vital Signs (Most Recent):  Temp: 98.3 °F (36.8 °C) (05/16/17 1159)  Pulse: 92 (05/16/17 1159)  Resp: 16 (05/16/17 1159)  BP: (!) 128/58 (05/16/17 1159)  SpO2: 95 % (05/16/17 1159) Vital Signs (24h Range):  Temp:  [97.4 °F (36.3 °C)-98.3 °F (36.8 °C)] 98.3 °F (36.8 °C)  Pulse:  [69-97] 92  Resp:  [16-18] 16  SpO2:  [92 %-95 %] 95 %  BP: (128-166)/(58-84) 128/58     Weight: 93.6 kg (206 lb 5.6 oz)  Body mass index is 29.61 kg/(m^2).    Physical Exam        General appearance: Well nourished, well developed, no acute distress.    Cardiovascular: pedal pulses 2, no edema or cyanosis, heart regular rate and rhythym, no carotid bruits.   -------------------------------------------------------------  Facial Expression: normal   Affect: full   Orientation to time & place: Oriented to time, place, person and situation   Attention & concentration: Normal attention span and concentration   Memory: Recent and remote memory intact  Language: Spontaneous, fluent; able to repeat and name objects.  However, when allowed to talk, he demonstrates subtle aphasia. He sometimes, but not all the time, seems to understand that what he is saying doesn't quite make sense.  Fund of knowledge: Aware of current events   Speech: normal (not dysarthric)  -------------------------------------------------------  Cranial nerves: normal visual acuity, visual fields full, optic discs not well visualized due small pupils, pupils equal round and reactive, extraocular movements intact when separately tested, but clear L JAYDEN.  Facial sensation intact, face symmetrical, hearing intact to whisper, palate raises midline, shoulder shrug strength normal, tongue protrudes midline.   -------------------------------------------------------  Musculoskeletal  Muscle strength: 5/5 in BUE.  4+/5 in LLE, 4+/5 RLE   No pronator drift  Sensation: Intact to light touch in all extremities   --------------------------------------------------------------  Cerebellar and Coordination  Gait: normal, able to tandem without difficulty   Finger-nose: +dysmetria, mild, on left  --------------------------------------------------------------  MOVEMENT DISORDERS FOCUSED EXAM  Abnormality of movement (bradykinesia, hyperkinesia) present? No   Tremor present? Yes.  Fine tremor noted in BUE   Posture: normal  Postural stability: no Romberg    Significant Labs:   CBC:     Recent Labs  Lab 05/15/17  9829   WBC 9.79   HGB 13.7*   HCT 39.2*        CMP:     Recent Labs  Lab 05/14/17  1736 05/15/17  7687    * 447*   * 138   K 4.3 4.0    104   CO2 18* 22*   BUN 22* 21*   CREATININE 1.5* 1.3   CALCIUM 8.9 9.2   PROT  --  7.0   ALBUMIN  --  3.5   BILITOT  --  0.7   ALKPHOS  --  80   AST  --  23   ALT  --  33   ANIONGAP 12 12   EGFRNONAA 50.2* 59.7*     Inflammatory Markers: No results for input(s): SEDRATE, CRP, PROCAL in the last 48 hours.    Significant Imaging: I have reviewed all pertinent imaging results/findings within the past 24 hours.     MRI brain 5/13/17:  Multiple foci of FLAIR hyperintensity in the deep cerebral periventricular white matter in a configuration and distribution which can be seen in the setting of underlying demyelinating process such as multiple sclerosis. There are several punctate foci of restricted diffusion including the left aspect of the midbrain tectum which in light of the aforementioned white matter changes may reflect regions of recent demyelination. Superimposed small acute infarcts would be difficult to exclude, although are felt less likely given the overall constellation of findings. Additionally, there is a 0.7 cm enhancing T2/FLAIR identified in the anterior right thalamus concerning for focus of active demyelination.           Also old lesions in corpus collosum      Assessment and Plan:     * Internuclear ophthalmoplegia of left eye  Acute diplopia (2/2 left JAYDEN) with new, enhancing lesion in right thalamus, which is radiographically consistent with demyelinating disease    5/13 started steroids  5/14 LP    -> continue solumedrol 1000mg daily for total of 5 days of 1g daily, or 500mg x1 followed by 1g daily x5 days    Demyelinating changes in brain  MRI with active lesion in thalamus, with concern for MS  - F/u REYMUNDO (wnl), NMO, ACE  - Check vitamin D level, and then start vitamin D 5K u daily  - continue solumedrol  - F/u CSF results  - MRI C/T/L WWO      Ataxia  Ataxia of gait since 5/10.     -> PT/OT    -> MS work-up as above    Acute confusional  state  Intermittent confusion, disorientation, which started 5/13 before steroids.  - B12 wnl.  F/u B1.  Consider empiric thiamine for possible Wernicke's aphasia.  - F/u SPEP, UPEP, RPR      VTE Risk Mitigation         Ordered     High Risk of VTE  Once      05/13/17 0259     Reason for No Pharmacological VTE Prophylaxis  Once      05/13/17 0259     Place sequential compression device  Until discontinued      05/13/17 0259          Sharon Luong MD  Neurology  [facility]

## 2017-05-16 NOTE — ASSESSMENT & PLAN NOTE
Intermittent confusion, disorientation, which started 5/13 before steroids.  - B12 wnl.  F/u B1.  Consider empiric thiamine for possible Wernicke's aphasia.  - F/u SPEP, UPEP, RPR

## 2017-05-16 NOTE — SUBJECTIVE & OBJECTIVE
"  Subjective:     Interval History:   Still confused, but improving.    From Consult 5/13/17:  56 yo M that I'm asked to see for possible MS.  Wife at bedside.  Came to ER today because of acute onset of diplopia Wednesday morning as well as ataxia "like i'm drunk."  Mri suggestive of demyelinating lesions (old and new).  Says he was told he had a stroke 16 years ago, but really "no one knew what was going on."  No symptoms since.  Since being in hospital, he has gotten confused and wandered out of obs unit looking for wife.  He is oriented currently.      Current Facility-Administered Medications   Medication Dose Route Frequency Provider Last Rate Last Dose    0.9%  NaCl infusion   Intravenous Continuous Emily Rainey  mL/hr at 05/16/17 0901      acetaminophen tablet 650 mg  650 mg Oral Q6H PRN Fabio Becerra MD        aspirin EC tablet 81 mg  81 mg Oral Daily Georgette Leon, NP   81 mg at 05/16/17 0854    atorvastatin tablet 80 mg  80 mg Oral Daily Georgette Leon, NP   80 mg at 05/16/17 0854    dextrose 50% injection 12.5 g  12.5 g Intravenous PRN Georgette Leon, NP        dextrose 50% injection 25 g  25 g Intravenous PRN Georgette Leon, NP        diltiaZEM 24 hr capsule 240 mg  240 mg Oral Daily Georgette Leon, NP   240 mg at 05/16/17 0855    glucagon (human recombinant) injection 1 mg  1 mg Intramuscular PRN Georgette Leon, NP        glucose chewable tablet 16 g  16 g Oral PRN Georgette Leon, NP        glucose chewable tablet 24 g  24 g Oral PRN Georgette Leon, NP        insulin aspart pen 1-10 Units  1-10 Units Subcutaneous QID (AC + HS) PRN Georgette Leon NP   10 Units at 05/16/17 1237    insulin aspart pen 18 Units  18 Units Subcutaneous TIDWM Guanaco Story MD   18 Units at 05/16/17 1237    insulin detemir pen 26 Units  26 Units Subcutaneous BID Guanaco Story MD   26 Units at 05/16/17 0856    lidocaine (PF) 10 mg/ml (1%) injection 10 mg  1 " mL Other Once Preston Quan MD        methylPREDNISolone sodium succinate (SOLU-MEDROL) 1,000 mg in dextrose 5 % 100 mL IVPB   Intravenous Daily BRIANA Hall MD        ramelteon tablet 8 mg  8 mg Oral Nightly PRN Anselmo Dumont MD   8 mg at 05/15/17 2058    sertraline tablet 150 mg  150 mg Oral Daily Georgette Leon, NP   150 mg at 05/16/17 0857    sodium chloride 0.9% bolus 500 mL  500 mL Intravenous Continuous PRN Georgette Leon, NP        sodium chloride 0.9% flush 3 mL  3 mL Intravenous Q8H Georgette Leon, NP   3 mL at 05/16/17 0523    valsartan tablet 320 mg  320 mg Oral Daily Guanaco Story MD   320 mg at 05/16/17 0857       Review of Systems   Eyes: Positive for visual disturbance.   Respiratory: Negative for shortness of breath.    Cardiovascular: Negative for chest pain.   Gastrointestinal: Negative for constipation, diarrhea, nausea and vomiting.   Neurological: Negative for weakness.   Psychiatric/Behavioral: Positive for confusion. Negative for agitation. The patient is not nervous/anxious.      Objective:     Vital Signs (Most Recent):  Temp: 98.3 °F (36.8 °C) (05/16/17 1159)  Pulse: 92 (05/16/17 1159)  Resp: 16 (05/16/17 1159)  BP: (!) 128/58 (05/16/17 1159)  SpO2: 95 % (05/16/17 1159) Vital Signs (24h Range):  Temp:  [97.4 °F (36.3 °C)-98.3 °F (36.8 °C)] 98.3 °F (36.8 °C)  Pulse:  [69-97] 92  Resp:  [16-18] 16  SpO2:  [92 %-95 %] 95 %  BP: (128-166)/(58-84) 128/58     Weight: 93.6 kg (206 lb 5.6 oz)  Body mass index is 29.61 kg/(m^2).    Physical Exam        General appearance: Well nourished, well developed, no acute distress.   Cardiovascular: pedal pulses 2, no edema or cyanosis, heart regular rate and rhythym, no carotid bruits.   -------------------------------------------------------------  Facial Expression: normal   Affect: full   Orientation to time & place: Oriented to time, place, person and situation   Attention & concentration: Normal attention span  and concentration   Memory: Recent and remote memory intact  Language: Spontaneous, fluent; able to repeat and name objects.  However, when allowed to talk, he demonstrates subtle aphasia. He sometimes, but not all the time, seems to understand that what he is saying doesn't quite make sense.  Fund of knowledge: Aware of current events   Speech: normal (not dysarthric)  -------------------------------------------------------  Cranial nerves: normal visual acuity, visual fields full, optic discs not well visualized due small pupils, pupils equal round and reactive, extraocular movements intact when separately tested, but clear L JAYDEN.  Facial sensation intact, face symmetrical, hearing intact to whisper, palate raises midline, shoulder shrug strength normal, tongue protrudes midline.   -------------------------------------------------------  Musculoskeletal  Muscle strength: 5/5 in BUE.  4+/5 in LLE, 4+/5 RLE   No pronator drift  Sensation: Intact to light touch in all extremities   --------------------------------------------------------------  Cerebellar and Coordination  Gait: normal, able to tandem without difficulty   Finger-nose: +dysmetria, mild, on left  --------------------------------------------------------------  MOVEMENT DISORDERS FOCUSED EXAM  Abnormality of movement (bradykinesia, hyperkinesia) present? No   Tremor present? Yes.  Fine tremor noted in BUE   Posture: normal  Postural stability: no Romberg    Significant Labs:   CBC:     Recent Labs  Lab 05/15/17  0348   WBC 9.79   HGB 13.7*   HCT 39.2*        CMP:     Recent Labs  Lab 05/14/17  1736 05/15/17  0348   * 447*   * 138   K 4.3 4.0    104   CO2 18* 22*   BUN 22* 21*   CREATININE 1.5* 1.3   CALCIUM 8.9 9.2   PROT  --  7.0   ALBUMIN  --  3.5   BILITOT  --  0.7   ALKPHOS  --  80   AST  --  23   ALT  --  33   ANIONGAP 12 12   EGFRNONAA 50.2* 59.7*     Inflammatory Markers: No results for input(s): SEDRATE, CRP, PROCAL in  the last 48 hours.    Significant Imaging: I have reviewed all pertinent imaging results/findings within the past 24 hours.     MRI brain 5/13/17:  Multiple foci of FLAIR hyperintensity in the deep cerebral periventricular white matter in a configuration and distribution which can be seen in the setting of underlying demyelinating process such as multiple sclerosis. There are several punctate foci of restricted diffusion including the left aspect of the midbrain tectum which in light of the aforementioned white matter changes may reflect regions of recent demyelination. Superimposed small acute infarcts would be difficult to exclude, although are felt less likely given the overall constellation of findings. Additionally, there is a 0.7 cm enhancing T2/FLAIR identified in the anterior right thalamus concerning for focus of active demyelination.           Also old lesions in corpus collosum

## 2017-05-16 NOTE — PLAN OF CARE
Problem: Patient Care Overview  Goal: Plan of Care Review  Outcome: Ongoing (interventions implemented as appropriate)  Plan of care reviewed with patient and his wife at the bedside, any questions/concerns addressed. Spouse verbalized understanding, pt is disoriented to time place and situation. V/S stable, afebrile throughout shift. Medicated per Mar denied pain. Pt free of fall/injury, Bed in lowest position with wheels locked, side rails up x3, call light and belongings within reach. Will continue to monitor

## 2017-05-16 NOTE — PLAN OF CARE
Problem: Patient Care Overview  Goal: Plan of Care Review  Outcome: Ongoing (interventions implemented as appropriate)  Pt free of falls this shift-high fall risk, bed alarm set, wheels locked, side rails 3x up, bed low position, call light within reach, instructed pt to call for help when want to get oob. Pt verbalized understandings. No skin breakdown noted. Behaviors that required restraints absent, no orders for restraints obtain. Denied pain. Cbg monitored and insulin administered per MAR order. No hypoglycemia noted or reported. Pt AAOx4. Afebrile. No acute distress noted. Vss. Will monitor pt.

## 2017-05-16 NOTE — PLAN OF CARE

## 2017-05-16 NOTE — PROGRESS NOTES
Progress Note  [facility] Medicine    Patient Name: Bessy Nunez  MRN: [id number]  YOB: 1960   Attending Physician: Elvira Hendricks MD  Admit Date: 5/13/2017  LOS: 2    SUBJECTIVE:     Reason for Admission:    See H&P for detailed presenting history and ROS.      Interval history: No acute events overnight. Remains mildly confused; per wife began around dose of first steroid administration. Glucose readings remain elevated; slightly improved from prior.    Review of Systems   Constitutional: Negative for chills and fever.   Eyes: Positive for double vision.   Respiratory: Negative for cough and shortness of breath.    Cardiovascular: Negative for chest pain and palpitations.   Gastrointestinal: Negative for abdominal pain and nausea.     OBJECTIVE:     Vital Signs (Most Recent):  Temp: 97.8 °F (36.6 °C) (05/16/17 2015)  Pulse: 82 (05/16/17 2015)  BP: (!) 155/81 (05/16/17 2015)  Resp: 17 (05/16/17 2015)  SpO2: (!) 94 % (05/16/17 2015)   Vital Signs Range (Last 24H):  Temp:  [97.7 °F (36.5 °C)-98.3 °F (36.8 °C)] 97.8 °F (36.6 °C)  Pulse:  [69-92] 82  Resp:  [16-18] 17  SpO2:  [92 %-95 %] 94 %  BP: (128-166)/(58-84) 155/81   Weight: 93.6 kg (206 lb 5.6 oz)  Body mass index is 29.61 kg/(m^2).    I & O (Last 24H):    Intake/Output Summary (Last 24 hours) at 05/16/17 2105  Last data filed at 05/16/17 1621   Gross per 24 hour   Intake             3900 ml   Output             2450 ml   Net             1450 ml     Physical Exam   Constitutional: He is oriented to person, place, and time. He appears well-developed and well-nourished.   HENT:   Head: Normocephalic and atraumatic.   Mouth/Throat: Oropharynx is clear and moist.   Eyes: Conjunctivae are normal. Pupils are equal, round, and reactive to light.   Cardiovascular: Normal rate, regular rhythm, normal heart sounds and intact distal pulses.    Pulmonary/Chest: Effort normal and breath sounds normal.   Abdominal: Soft. Bowel sounds are normal. He exhibits no  distension. There is no tenderness.   Musculoskeletal: Normal range of motion.   LLE 4+/5 strength; otherwise 5/5  ?Mild disconjugate gaze   Neurological: He is alert and oriented to person, place, and time.   Oriented currently but frequently deviates off topic; occasionally believes he is at his prior place of employment   Skin: Skin is warm and dry.   Vitals reviewed.    Laboratory Results (Last 24H):  Recent Results (from the past 24 hour(s))   POCT glucose    Collection Time: 05/16/17  8:13 AM   Result Value Ref Range    POCT Glucose 363 (H) 70 - 110 mg/dL   POCT glucose    Collection Time: 05/16/17 12:36 PM   Result Value Ref Range    POCT Glucose 364 (H) 70 - 110 mg/dL   POCT glucose    Collection Time: 05/16/17  4:19 PM   Result Value Ref Range    POCT Glucose 400 (H) 70 - 110 mg/dL     Significant Imaging:  No new imaging this morning.    ASSESSMENT/PLAN:     Internuclear ophthalmoplegia of left eye  - Presented with double vision; initial suspicion for stroke given history. MRI Brain 05/13 with multiple foci concerning for demyelinating process.  - MRA Head/Neck unremarkable for ischemia or significant stenosis.  - TSH WNL; 2D Echo unremarkable.  - General Neurology consulted for MS workup; continuing methylprednisolone 1g IV daily; extending course for additional two doses.  - LP performed 05/14.  - Will check vit D level.  - Will follow-up with further neurology recommendations.    DMII with neuropathy, uncontrolled  -  HgbA1c 11.4 on 05/13  - At home, metformin 1000mg PO BID, insulin detemir 42U subQ qHS.  - Remains hyperglycemic secondary to superimposed high-dose steroids on chronic poor baseline control  - Increased to insulin detemir 26U subQ BID, insulin aspart 18U subQ TIDWM, moderate dose insulin aspart 1-10U subQ PRN.       Mixed hyperlipidemia  - Continuing atorvastatin 80mg PO daily.      Essential hypertension  - Continuing valsartan 320mg PO daily.    VTE Risk Mitigation         Ordered      High Risk of VTE  Once      05/13/17 0259     Reason for No Pharmacological VTE Prophylaxis  Once      05/13/17 0259     Place sequential compression device  Until discontinued      05/13/17 0259        Diet/Code Status  - Diet diabetic 2000al.  - Full code.    D. Sanju Givens MD   PGY-2   [phone]

## 2017-05-17 LAB
25(OH)D3+25(OH)D2 SERPL-MCNC: 11 NG/ML
ANION GAP SERPL CALC-SCNC: 11 MMOL/L
BUN SERPL-MCNC: 21 MG/DL
CALCIUM SERPL-MCNC: 8.2 MG/DL
CHLORIDE SERPL-SCNC: 102 MMOL/L
CO2 SERPL-SCNC: 24 MMOL/L
CREAT SERPL-MCNC: 1 MG/DL
EST. GFR  (AFRICAN AMERICAN): >60 ML/MIN/1.73 M^2
EST. GFR  (NON AFRICAN AMERICAN): >60 ML/MIN/1.73 M^2
GLUCOSE SERPL-MCNC: 355 MG/DL
MAGNESIUM SERPL-MCNC: 1.9 MG/DL
PHOSPHATE SERPL-MCNC: 3.5 MG/DL
POCT GLUCOSE: 302 MG/DL (ref 70–110)
POCT GLUCOSE: 348 MG/DL (ref 70–110)
POCT GLUCOSE: 369 MG/DL (ref 70–110)
POTASSIUM SERPL-SCNC: 3.5 MMOL/L
SODIUM SERPL-SCNC: 137 MMOL/L
VIT B1 SERPL-MCNC: 69 UG/L (ref 38–122)

## 2017-05-17 PROCEDURE — 25000003 PHARM REV CODE 250: Performed by: NURSE PRACTITIONER

## 2017-05-17 PROCEDURE — 36415 COLL VENOUS BLD VENIPUNCTURE: CPT

## 2017-05-17 PROCEDURE — 84100 ASSAY OF PHOSPHORUS: CPT

## 2017-05-17 PROCEDURE — 99233 SBSQ HOSP IP/OBS HIGH 50: CPT | Mod: ,,, | Performed by: HOSPITALIST

## 2017-05-17 PROCEDURE — 97116 GAIT TRAINING THERAPY: CPT

## 2017-05-17 PROCEDURE — 83735 ASSAY OF MAGNESIUM: CPT

## 2017-05-17 PROCEDURE — 97112 NEUROMUSCULAR REEDUCATION: CPT

## 2017-05-17 PROCEDURE — 97530 THERAPEUTIC ACTIVITIES: CPT

## 2017-05-17 PROCEDURE — 11000001 HC ACUTE MED/SURG PRIVATE ROOM

## 2017-05-17 PROCEDURE — A9585 GADOBUTROL INJECTION: HCPCS | Performed by: HOSPITALIST

## 2017-05-17 PROCEDURE — 63600175 PHARM REV CODE 636 W HCPCS: Performed by: INTERNAL MEDICINE

## 2017-05-17 PROCEDURE — 97803 MED NUTRITION INDIV SUBSEQ: CPT

## 2017-05-17 PROCEDURE — 25500020 PHARM REV CODE 255: Performed by: HOSPITALIST

## 2017-05-17 PROCEDURE — 25000003 PHARM REV CODE 250: Performed by: INTERNAL MEDICINE

## 2017-05-17 PROCEDURE — 97535 SELF CARE MNGMENT TRAINING: CPT

## 2017-05-17 PROCEDURE — 86592 SYPHILIS TEST NON-TREP QUAL: CPT

## 2017-05-17 PROCEDURE — 80048 BASIC METABOLIC PNL TOTAL CA: CPT

## 2017-05-17 PROCEDURE — 82306 VITAMIN D 25 HYDROXY: CPT

## 2017-05-17 PROCEDURE — 99233 SBSQ HOSP IP/OBS HIGH 50: CPT | Mod: ,,, | Performed by: PSYCHIATRY & NEUROLOGY

## 2017-05-17 PROCEDURE — 25000003 PHARM REV CODE 250: Performed by: STUDENT IN AN ORGANIZED HEALTH CARE EDUCATION/TRAINING PROGRAM

## 2017-05-17 RX ORDER — THIAMINE HCL 100 MG
100 TABLET ORAL DAILY
Status: DISCONTINUED | OUTPATIENT
Start: 2017-05-17 | End: 2017-05-18 | Stop reason: HOSPADM

## 2017-05-17 RX ORDER — ENOXAPARIN SODIUM 100 MG/ML
40 INJECTION SUBCUTANEOUS EVERY 24 HOURS
Status: DISCONTINUED | OUTPATIENT
Start: 2017-05-17 | End: 2017-05-18 | Stop reason: HOSPADM

## 2017-05-17 RX ORDER — CHOLECALCIFEROL (VITAMIN D3) 25 MCG
5000 TABLET ORAL DAILY
Status: DISCONTINUED | OUTPATIENT
Start: 2017-05-17 | End: 2017-05-18 | Stop reason: HOSPADM

## 2017-05-17 RX ORDER — INSULIN ASPART 100 [IU]/ML
22 INJECTION, SOLUTION INTRAVENOUS; SUBCUTANEOUS
Status: DISCONTINUED | OUTPATIENT
Start: 2017-05-17 | End: 2017-05-18 | Stop reason: HOSPADM

## 2017-05-17 RX ORDER — GADOBUTROL 604.72 MG/ML
10 INJECTION INTRAVENOUS
Status: COMPLETED | OUTPATIENT
Start: 2017-05-17 | End: 2017-05-17

## 2017-05-17 RX ADMIN — VITAMIN D, TAB 1000IU (100/BT) 5000 UNITS: 25 TAB at 09:05

## 2017-05-17 RX ADMIN — Medication 3 ML: at 09:05

## 2017-05-17 RX ADMIN — VALSARTAN 320 MG: 160 TABLET, FILM COATED ORAL at 08:05

## 2017-05-17 RX ADMIN — SODIUM CHLORIDE: 0.9 INJECTION, SOLUTION INTRAVENOUS at 12:05

## 2017-05-17 RX ADMIN — DEXTROSE: 50 INJECTION, SOLUTION INTRAVENOUS at 05:05

## 2017-05-17 RX ADMIN — INSULIN ASPART 22 UNITS: 100 INJECTION, SOLUTION INTRAVENOUS; SUBCUTANEOUS at 12:05

## 2017-05-17 RX ADMIN — DILTIAZEM HYDROCHLORIDE 240 MG: 240 CAPSULE, COATED, EXTENDED RELEASE ORAL at 08:05

## 2017-05-17 RX ADMIN — ASPIRIN 81 MG: 81 TABLET, COATED ORAL at 08:05

## 2017-05-17 RX ADMIN — GADOBUTROL 10 ML: 604.72 INJECTION INTRAVENOUS at 06:05

## 2017-05-17 RX ADMIN — THIAMINE HCL TAB 100 MG 100 MG: 100 TAB at 08:05

## 2017-05-17 RX ADMIN — ATORVASTATIN CALCIUM 80 MG: 20 TABLET, FILM COATED ORAL at 08:05

## 2017-05-17 RX ADMIN — SERTRALINE HYDROCHLORIDE 150 MG: 50 TABLET ORAL at 08:05

## 2017-05-17 RX ADMIN — INSULIN ASPART 8 UNITS: 100 INJECTION, SOLUTION INTRAVENOUS; SUBCUTANEOUS at 08:05

## 2017-05-17 RX ADMIN — INSULIN ASPART 5 UNITS: 100 INJECTION, SOLUTION INTRAVENOUS; SUBCUTANEOUS at 09:05

## 2017-05-17 NOTE — ASSESSMENT & PLAN NOTE
MRI with active lesion in thalamus, with concern for MS  - F/u REYMUNDO (wnl), NMO, ACE  - Continue vitamin D 5K u daily.  Vitamin D 11 this admission.  - s/p 5.5g solumedrol  - CSF results from 5/14/17 show zero oligoclonal bands, and normal IgG  - MRI C/T/L WWO - ok to do as an outpatient if needed; recommend having completed prior to outpatient neuro/MS f/u

## 2017-05-17 NOTE — PLAN OF CARE
Problem: Physical Therapy Goal  Goal: Physical Therapy Goal  Goals to be met by: 2017     Patient will increase functional independence with mobility by performin. Sit to stand transfer with [place]  2. Bed to chair transfer with Supervision using AD or No AD  3. Gait x150 feet with Supervision using AD or No AD  4. Ascend/descend x1 flight of stairs with left Handrails Supervision  5. Stand for x10 minutes with Stand-by Assistance while performing dynamic standing balance tasks  6. Lower extremity exercise program x15 reps per handout, with supervision   Outcome: Ongoing (interventions implemented as appropriate)  Pt progressing towards goals. All goals remain appropriate at this time.     Dayana Fine DPT, PT  2017

## 2017-05-17 NOTE — ASSESSMENT & PLAN NOTE
Intermittent confusion, disorientation, which started 5/13 before steroids.  - B12 wnl.  B1 69.  Continue empiric thiamine for possible Wernicke's aphasia; speech is improving with B1 administration.  - F/u RPR

## 2017-05-17 NOTE — PT/OT/SLP PROGRESS
"Occupational Therapy  Treatment    Bessy Nunez   MRN: [id number]   Admitting Diagnosis: Internuclear ophthalmoplegia of left eye    OT Date of Treatment: 17   OT Start Time: 0750  OT Stop Time: 835  OT Total Time (min): 45 min    Billable Minutes:  Self Care/Home Management 20 and Neuromuscular Re-education 25    General Precautions: Standard, aspiration, fall  Orthopedic Precautions: N/A  Braces: N/A    Do you have any cultural, spiritual, Sikhism conflicts, given your current situation?: Worship    Subjective:  Communicated with nurse prior to session.  Patient:  "I was leaning to the right; I could tell."  Pain Ratin/10   Pain Rating Post-Intervention: 0/10    Objective:  Patient found with: telemetry, peripheral IV  Wife present.    Functional Mobility:  Bed Mobility:  Rolling/Turning to Left: Modified independent  Rolling/Turning Right: Modified independent  Scooting/Bridging: Modified Independent  Supine to Sit: Modified Independent  Sit to Supine: Modified Independent    Transfers:   Sit <> Stand Assistance: Contact Guard Assistance  Sit <> Stand Assistive Device: No Assistive Device  Bed <> Chair Technique: Stand Pivot  Bed <> Chair Transfer Assistance: Contact Guard Assistance    Activities of Daily Living:  UE Dressing Level of Assistance: Contact guard (for standing balance to gather clothing)  LE Dressing Level of Assistance: Minimum assistance (for standing balance)  Grooming Position: Standing  Grooming Level of Assistance: Contact guard assistance  Toileting Where Assessed: Toilet  Toileting Level of Assistance: Contact guard        Additional Treatment:   Patient education provided on role of OT, core stabilization and handwriting HEP.  Patient verbalizing understanding via teach back method. Continued education, patient/ family training recommended. Patient's functional status and disposition recommendation discussed with patient. Patient alert and oriented x 3; able to follow 4/4 one " step commands. Addressed tracing exercises in preparation for handwriting. Addressed core stabilization; min-mod assist required during unilateral stance; ws to the right.  Patient attentive and interactive throughout the session. White board updated in patient's room. OT asked if there were any other questions; patient/ family had no further questions.    AM-PAC 6 CLICK ADL   How much help from another person does this patient currently need?   1 = Unable, Total/Dependent Assistance  2 = A lot, Maximum/Moderate Assistance  3 = A little, Minimum/Contact Guard/Supervision  4 = None, Modified [place]/Independent    Putting on and taking off regular lower body clothing? : 3  Bathing (including washing, rinsing, drying)?: 3  Toileting, which includes using toilet, bedpan, or urinal? : 3  Putting on and taking off regular upper body clothing?: 3  Taking care of personal grooming such as brushing teeth?: 3  Eating meals?: 3  Total Score: 18     AM-PAC Raw Score CMS G-Code Modifier Level of Impairment Assistance   6 % Total / Unable   7 - 9 CM 80 - 100% Maximal Assist   10 - 14 CL 60 - 80% Moderate Assist   15 - 19 CK 40 - 60% Moderate Assist   20 - 22 CJ 20 - 40% Minimal Assist   23 CI 1-20% SBA / CGA   24 CH 0% Independent/ Mod I     Patient left supine with all lines intact and call button in reach    Assessment:  Bessy Nunez is a 57 y.o. male with a medical diagnosis of Diplopia and presents with performance deficits of physical skills including impaired balance, mobility, gross motor coordination, sensation (specifically viisual); demonstrating performance deficits of cognitive skills including impaired problem solving, and memory all resulting in inability organizing occupational performance in a timely and safe manner. These performance deficits have resulted in activity limitations including but not limited to: transfers, ascending/ descending stairs, walking short and long distances, walking around  obstacles, transitional movement patterns (kneeling, bending); upper body dressing, lower body dressing, brushing teeth, toileting, bathing, carrying objects, cutting grass and driving. Patient's role as , father and independent caretaker for self has been affected. Patient will benefit from skilled OT services to maximize level of independence with self-care skills and functional mobility. Will benefit from outpt OT.    Rehab identified problem list/impairments: Rehab identified problem list/impairments: weakness, gait instability, decreased upper extremity function, impaired endurance, impaired balance, decreased lower extremity function, impaired self care skills, impaired functional mobilty, decreased coordination, impaired cognition, decreased safety awareness, impaired fine motor, impaired coordination    Rehab potential is good.    Activity tolerance: Good    Discharge recommendations: Discharge Facility/Level Of Care Needs: outpatient OT     Barriers to discharge: Barriers to Discharge: None    Equipment recommendations: bath bench     GOALS:   Occupational Therapy Goals        Problem: Occupational Therapy Goal    Goal Priority Disciplines Outcome Interventions   Occupational Therapy Goal     OT, PT/OT     Description:  Goals set 5/13 to be addressed for 7 days with expiration date, 5/20:  Patient will increase functional independence with ADLs by performing:  Patient will demonstrate stand pivot transfers with modified independence.   Not met  Patient will demonstrate grooming while standing with modified independence.   Not met  Patient will demonstrate upper body dressing with modified independence.   Not met  Patient will demonstrate lower body dressing with modified independence.   Not met  Patient will demonstrate toileting with modified independence.   Not met  Patient will demonstrate bathing while seated EOB with modified independence.   Not met  Patient's family / caregiver will demonstrate  independence and safety with assisting patient with self-care skills and functional mobility.     Not met  Patient and/or patient's family will verbalize understanding of stroke prevention guidelines, personal risk factors and stroke warning signs via teachback method.  Not met                     Plan:  Patient to be seen 6 x/week to address the above listed problems via self-care/home management, cognitive retraining, therapeutic activities, therapeutic exercises, neuromuscular re-education  Plan of Care expires: 06/11/17  Plan of Care reviewed with: patient, spouse         COLTON Chacko  05/17/2017

## 2017-05-17 NOTE — SUBJECTIVE & OBJECTIVE
"  Subjective:     Interval History:   Vision / L JAYDEN / confusion improving.  S/p 5.5g solumedrol.    From Consult 5/13/17:  56 yo M that I'm asked to see for possible MS.  Wife at bedside.  Came to ER today because of acute onset of diplopia Wednesday morning as well as ataxia "like i'm drunk."  Mri suggestive of demyelinating lesions (old and new).  Says he was told he had a stroke 16 years ago, but really "no one knew what was going on."  No symptoms since.  Since being in hospital, he has gotten confused and wandered out of obs unit looking for wife.  He is oriented currently.      Current Facility-Administered Medications   Medication Dose Route Frequency Provider Last Rate Last Dose    0.9%  NaCl infusion   Intravenous Continuous Emily Rainey  mL/hr at 05/17/17 1248      acetaminophen tablet 650 mg  650 mg Oral Q6H PRN Fabio Becerra MD        aspirin EC tablet 81 mg  81 mg Oral Daily Georgette Leon, NP   81 mg at 05/17/17 0847    atorvastatin tablet 80 mg  80 mg Oral Daily Georgette Leon, NP   80 mg at 05/17/17 0848    dextrose 50% injection 12.5 g  12.5 g Intravenous PRN Georgette Leon NP        dextrose 50% injection 25 g  25 g Intravenous PRN Georgette Leon, MORAIMA        diltiaZEM 24 hr capsule 240 mg  240 mg Oral Daily Georgette Leon, NP   240 mg at 05/17/17 0847    enoxaparin injection 40 mg  40 mg Subcutaneous Daily Guanaco Story MD        glucagon (human recombinant) injection 1 mg  1 mg Intramuscular PRN Georgette Leon NP        glucose chewable tablet 16 g  16 g Oral PRN Georgette Leon NP        glucose chewable tablet 24 g  24 g Oral PRN Georgette Leon NP        insulin aspart pen 1-10 Units  1-10 Units Subcutaneous QID (AC + HS) PRN Georgette Leon NP   8 Units at 05/17/17 0852    insulin aspart pen 22 Units  22 Units Subcutaneous TIDWM Guanaco Story MD   22 Units at 05/17/17 1245    insulin detemir pen 32 Units  32 Units " Subcutaneous BID Guanaco Story MD   32 Units at 05/17/17 0853    lidocaine (PF) 10 mg/ml (1%) injection 10 mg  1 mL Other Once Preston Quan MD        quetiapine tablet 25 mg  25 mg Oral Nightly PRN BRIANA Hall MD        ramelteon tablet 8 mg  8 mg Oral Nightly PRN Anselmo Dumont MD   8 mg at 05/15/17 2058    sertraline tablet 150 mg  150 mg Oral Daily Georgette Leon NP   150 mg at 05/17/17 0847    sodium chloride 0.9% bolus 500 mL  500 mL Intravenous Continuous PRN Georgette Leon NP        sodium chloride 0.9% flush 3 mL  3 mL Intravenous Q8H Georgette Leon NP   3 mL at 05/16/17 2024    thiamine tablet 100 mg  100 mg Oral Daily Guanaco Story MD   100 mg at 05/17/17 0847    valsartan tablet 320 mg  320 mg Oral Daily Guanaco Story MD   320 mg at 05/17/17 0847    vitamin D 1000 units tablet 5,000 Units  5,000 Units Oral Daily Guanaco Story MD   5,000 Units at 05/17/17 0947       Review of Systems   Eyes: Positive for visual disturbance.   Respiratory: Negative for shortness of breath.    Cardiovascular: Negative for chest pain.   Gastrointestinal: Negative for constipation, diarrhea, nausea and vomiting.   Neurological: Negative for weakness.   Psychiatric/Behavioral: Positive for confusion. Negative for agitation. The patient is not nervous/anxious.      Objective:     Vital Signs (Most Recent):  Temp: 98 °F (36.7 °C) (05/17/17 1605)  Pulse: 93 (05/17/17 1605)  Resp: 18 (05/17/17 1605)  BP: (!) 177/86 (05/17/17 1605)  SpO2: 96 % (05/17/17 1605) Vital Signs (24h Range):  Temp:  [97.5 °F (36.4 °C)-98.8 °F (37.1 °C)] 98 °F (36.7 °C)  Pulse:  [] 93  Resp:  [16-20] 18  SpO2:  [93 %-96 %] 96 %  BP: (153-184)/(77-91) 177/86     Weight: 93.6 kg (206 lb 5.6 oz)  Body mass index is 29.61 kg/(m^2).    Physical Exam        General appearance: Well nourished, well developed, no acute distress.   Cardiovascular: pedal pulses 2, no edema or cyanosis, heart  regular rate and rhythym, no carotid bruits.   -------------------------------------------------------------  Facial Expression: normal   Affect: full   Orientation to time & place: Oriented to time, place, person and situation   Attention & concentration: Normal attention span and concentration   Memory: Recent and remote memory intact  Language: Spontaneous, fluent; able to repeat and name objects.  However, when allowed to talk, he demonstrates subtle aphasia (improving).  Fund of knowledge: Aware of current events   Speech: normal (not dysarthric)  -------------------------------------------------------  Cranial nerves: normal visual acuity, visual fields full, optic discs not well visualized due small pupils, pupils equal round and reactive, extraocular movements intact when separately tested, but clear L JAYDEN (improving).  Facial sensation intact, face symmetrical, hearing intact to whisper, palate raises midline, shoulder shrug strength normal, tongue protrudes midline.   -------------------------------------------------------  Musculoskeletal  Muscle strength: 5/5 in BUE.  4+/5 in LLE, 4+/5 RLE   No pronator drift  Sensation: Intact to light touch in all extremities   --------------------------------------------------------------  Cerebellar and Coordination  Gait: normal, able to tandem without difficulty   Finger-nose: +dysmetria, mild, on left (improving)  --------------------------------------------------------------  MOVEMENT DISORDERS FOCUSED EXAM  Abnormality of movement (bradykinesia, hyperkinesia) present? No   Tremor present? Yes.  Fine tremor noted in BUE   Posture: normal  Postural stability: no Romberg    Significant Labs:   CBC:   No results for input(s): WBC, HGB, HCT, PLT in the last 48 hours.  CMP:     Recent Labs  Lab 05/17/17  0527   *      K 3.5      CO2 24   BUN 21*   CREATININE 1.0   CALCIUM 8.2*   MG 1.9   ANIONGAP 11   EGFRNONAA >60.0     Inflammatory Markers: No  results for input(s): SEDRATE, CRP, PROCAL in the last 48 hours.    Significant Imaging: I have reviewed all pertinent imaging results/findings within the past 24 hours.     MRI brain 5/13/17:  Multiple foci of FLAIR hyperintensity in the deep cerebral periventricular white matter in a configuration and distribution which can be seen in the setting of underlying demyelinating process such as multiple sclerosis. There are several punctate foci of restricted diffusion including the left aspect of the midbrain tectum which in light of the aforementioned white matter changes may reflect regions of recent demyelination. Superimposed small acute infarcts would be difficult to exclude, although are felt less likely given the overall constellation of findings. Additionally, there is a 0.7 cm enhancing T2/FLAIR identified in the anterior right thalamus concerning for focus of active demyelination.           Also old lesions in corpus collosum

## 2017-05-17 NOTE — PT/OT/SLP PROGRESS
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  Pt agreeable to therapy session. Pt states "i'm going to try to take my time so I don't lose my balance."     Pain Ratin/10  Pain Rating Post-Intervention: 0/10    Objective:   Patient found with: peripheral IV, telemetry    Functional Mobility:  Bed Mobility:   Rolling/Turning to Left: Independent  Rolling/Turning Right: Independent  Scooting/Bridging: Independent  Supine to Sit: Independent  Sit to Supine: Independent    Transfers:  Sit <> Stand Assistance: Supervision  Sit <> Stand Assistive Device: Rolling Walker    Gait:   Gait Distance: 250' with SBA and RW. Pt required MAX verbal cueing for safety and AD management. Pt required CGA for turning. Pt experienced 1 unpreditable lateral sway with turning but able to self correct without assistance.   Assistance 1: Contact Guard Assistance, Stand by Assistance  Gait Assistive Device: Rolling walker  Gait Pattern: reciprocal  Gait Deviation(s): decreased justin, decreased step length, decreased stride length    Stairs:  Pt ascended/descend 3 stair(s) with No Assistive Device with right with Minimal Assistance.     Balance:   Static Sit: GOOD: Takes MODERATE challenges from all directions  Dynamic Sit: GOOD-: Maintains balance through MODERATE excursions of active trunk movement,     Static Stand: GOOD-: Takes MODERATE challenges from all " directions inconsistently  Dynamic stand: FAIR: Needs CONTACT GUARD during gait     Therapeutic Activities and Exercises:  EDGE BALANCE SCALE     1.SITTING TO STANDING:  (3) Pt able to stand independently using hands     2. STANDING UNSUPPORTED: (3) Pt able to stand 2 minutes with supervision     3. SITTING WITH BACK UNSUPPORTED BUT FEET SUPPORTED ON FLOOR: (4) Pt able to sit safely and securely 2 minutes     4. STANDING TO SITTING:(3) Pt controls descent by using hands     5. TRANSFERS:(2) Pt able to transfer with verbal cueing and/or supervision    6. STANDING UNSUPPORTED WITH EYES CLOSED:(3) Pt able to stand 10 seconds with supervision    7. STANDING UNSUPPORTED WITH FEET TOGETHER:(2) Pt able to place feet together independently and to hold for 30 seconds    8. REACHING FORWARD WITH OUTSTRETCHED ARM WHILE STANDING:(1) Pt reaches forward but needs supervision    9.  OBJECT FROM THE FLOOR FROM A STANDING POSITION:(3) Pt able to  slipper but needs supervision    10. TURNING TO LOOK BEHIND OVER LEFT AND RIGHT SHOULDERS WHILE STANDING:(3) Pt looks behind one side only other side shows less weight shift    11. TURN 360 DEGREES:(1) Pt needs close supervision or verbal cueing    12. PLACING ALTERNATE FOOT ON STEP OR STOOL WHILE STANDING UNSUPPORTED:(1) Pt able to complete >2 steps needs minimal assist    13. STANDING UNSUPPORTED ONE FOOT IN FRONT: (1) Pt aneeds help to step but can hold 15 seconds     14. STANDING ON ONE LEG:(0) Pt unable to try or needs assist to prevent fall    Total Score 30/56    41-56 = low fall risk  21-40 = medium fall risk  0 -20 = high fall risk       AM-PAC 6 CLICK MOBILITY  How much help from another person does this patient currently need?   1 = Unable, Total/Dependent Assistance  2 = A lot, Maximum/Moderate Assistance  3 = A little, Minimum/Contact Guard/Supervision  4 = None, Modified [place]/Independent    Turning over in bed (including adjusting bedclothes, sheets and  blankets)?: 4  Sitting down on and standing up from a chair with arms (e.g., wheelchair, bedside commode, etc.): 3  Moving from lying on back to sitting on the side of the bed?: 4  Moving to and from a bed to a chair (including a wheelchair)?: 3  Need to walk in hospital room?: 3  Climbing 3-5 steps with a railing?: 3  Total Score: 20    AM-PAC Raw Score CMS G-Code Modifier Level of Impairment Assistance   6 % Total / Unable   7 - 9 CM 80 - 100% Maximal Assist   10 - 14 CL 60 - 80% Moderate Assist   15 - 19 CK 40 - 60% Moderate Assist   20 - 22 CJ 20 - 40% Minimal Assist   23 CI 1-20% SBA / CGA   24 CH 0% Independent/ Mod I     Patient left seated EOB with all lines intact, call button in reach and spouse present.    Assessment:  Bessy Nunez is a 57 y.o. male with a medical diagnosis of Internuclear ophthalmoplegia of left eye and presents with problems listed below. Pt progressing towards goals, but not at PLOF. Pt tolerated session well with no increase in pain/discomfort.  Pt is improving with therapy evidenced by ambulating 250' with RW, SBA, and cueing for safety/AD management. Pt completed EDGE Balance Assessment placing him in medium fall risk category. Pt would benefit from continued PT services to improve overall functional mobility. Recommend d/c to Home to maximize functional independence.      Rehab identified problem list/impairments: Rehab identified problem list/impairments: impaired functional mobilty, gait instability, impaired balance, impaired cognition, decreased coordination, decreased safety awareness, impaired fine motor    Rehab potential is good.    Activity tolerance: Good    Discharge recommendations: Discharge Facility/Level Of Care Needs: outpatient PT     Barriers to discharge: Barriers to Discharge: None    Equipment recommendations: Equipment Needed After Discharge: bath bench, walker, rolling     GOALS:   Physical Therapy Goals        Problem: Physical Therapy Goal    Goal  Priority Disciplines Outcome Goal Variances Interventions   Physical Therapy Goal     PT/OT, PT Ongoing (interventions implemented as appropriate)     Description:  Goals to be met by: 2017     Patient will increase functional independence with mobility by performin. Sit to stand transfer with [place]  2. Bed to chair transfer with Supervision using AD or No AD  3. Gait x150 feet with Supervision using AD or No AD  4. Ascend/descend x1 flight of stairs with left Handrails Supervision  5. Stand for x10 minutes with Stand-by Assistance while performing dynamic standing balance tasks  6. Lower extremity exercise program x15 reps per handout, with supervision                PLAN:    Patient to be seen 3 x/week  to address the above listed problems via gait training, therapeutic activities, therapeutic exercises, neuromuscular re-education  Plan of Care expires: 17  Plan of Care reviewed with: patient, spouse         Dayana ESCAMILLA Rody, PT  2017

## 2017-05-17 NOTE — PROGRESS NOTES
Hospital Medicine  Progress Note      SUBJECTIVE:     Chief Complaint / Reason for Admission: L JAYDEN    LOS: 3 days  Admit Date: 5/13/2017    Interval history  No acute events overnight. VSS, afebrile. Patient was mistakenly administered two doses of solumedrol yesterday morning, medical error discussed with patient and family.  Today is last dose of solumedrol.    Scheduled Medications   aspirin  81 mg Oral Daily    atorvastatin  80 mg Oral Daily    diltiaZEM  240 mg Oral Daily    insulin aspart  22 Units Subcutaneous TIDWM    insulin detemir  32 Units Subcutaneous BID    lidocaine (PF) 10 mg/ml (1%)  1 mL Other Once    sertraline  150 mg Oral Daily    sodium chloride 0.9%  3 mL Intravenous Q8H    thiamine  100 mg Oral Daily    valsartan  320 mg Oral Daily    vitamin D  5,000 Units Oral Daily       Continous Medications   sodium chloride 0.9% 150 mL/hr at 05/17/17 1248    sodium chloride 0.9%         Medications PRN  acetaminophen, dextrose 50%, dextrose 50%, glucagon (human recombinant), glucose, glucose, insulin aspart, quetiapine, ramelteon, sodium chloride 0.9%    OBJECTIVE:     Temp:  [97.5 °F (36.4 °C)-98.8 °F (37.1 °C)]   Pulse:  []   Resp:  [16-20]   BP: (153-184)/(77-91)   SpO2:  [93 %-96 %]     Vitals:    05/17/17 1605   BP: (!) 177/86   Pulse: 93   Resp: 18   Temp: 98 °F (36.7 °C)       I & O (Last 24H):  I/O last 3 completed shifts:  In: 3900 [P.O.:3900]  Out: 2450 [Urine:2450]    Physical Exam:  Constitutional: He is oriented to person, place, and time. He appears well-developed and well-nourished.   HENT:   Head: Normocephalic and atraumatic.   Mouth/Throat: Oropharynx is clear and moist.   Eyes: Conjunctivae are normal. Pupils are equal, round, and reactive to light.   Cardiovascular: Normal rate, regular rhythm, normal heart sounds and intact distal pulses.   Pulmonary/Chest: Effort normal and breath sounds normal.   Abdominal: Soft. Bowel sounds are normal. He exhibits no  distension. There is no tenderness.   Musculoskeletal: Normal range of motion.   LLE 4+/5 strength; otherwise 5/5  Disconjugate gaze when looking right.  Neurological: He is alert and oriented to person, place, and time.   Skin: Skin is warm and dry.     Laboratory:  CBC BMP     Recent Labs  Lab 05/14/17  0456 05/15/17  0348   WBC 9.48 9.79   HGB 13.5* 13.7*   HCT 38.0* 39.2*   MCV 83 84    182   RDW 13.6 13.9      Recent Labs  Lab 05/15/17  0348 05/17/17  0527    137   K 4.0 3.5    102   CO2 22* 24   BUN 21* 21*   CREATININE 1.3 1.0   * 355*   CALCIUM 9.2 8.2*          LFTs    Recent Labs  Lab 05/14/17  0456 05/15/17  0348   PROT 6.9 7.0   BILITOT 1.1* 0.7   ALKPHOS 77 80   AST 30 23   ALT 34 33      Urine  No results for input(s): COLORU, CLARITYU, SPECGRAV, PHUR, PROTEINUA, GLUCOSEU, BILIRUBINCON, BLOODU, WBCU, RBCU, BACTERIA, MUCUS, NITRITE, LEUKOCYTESUR, UROBILINOGEN, HYALINECASTS in the last 168 hours.     Coag Labs    Recent Labs  Lab 05/13/17  0138   INR 1.0    Mag & Phos    Recent Labs  Lab 05/17/17  0527   MG 1.9   PHOS 3.5        Lactic Acid  No results for input(s): LACTATE in the last 168 hours.    Cardiac Enzyme  No results for input(s): CKTOTAL, CPK, TROPONINI, TROPONINT, TROPTSTAT, CPKMB, MB in the last 168 hours.    BNP  No results for input(s): BNP in the last 168 hours.    ABG  No results for input(s): PH, PO2, PCO2, HCO3, BE in the last 168 hours.    Thyroid    Recent Labs  Lab 05/13/17  0138   TSH 1.248       Lipase  No results for input(s): LIPASE in the last 168 hours.    Hemoglobin A1C    Recent Labs  Lab 05/13/17  0138   HGBA1C 11.4*       POCT Glucose  Recent Labs      05/15/17   1657  05/15/17   2057  05/16/17   0813  05/16/17   1236  05/16/17   1619  05/16/17   2018  05/17/17   0851  05/17/17   1244   POCTGLUCOSE  421*  391*  363*  364*  400*  355*  348*  302*         Radiology:  Imaging Results         MRI Brain W WO Contrast (Final result)    Abnormal Result  time:  05/13/17 06:32:52    Final result by Mikhail Hatch MD (05/13/17 06:32:52)    Impression:         1. Multiple foci of FLAIR hyperintensity in the deep cerebral periventricular white matter in a configuration and distribution which can be seen in the setting of underlying demyelinating process such as multiple sclerosis. There are several punctate foci of restricted diffusion including the left aspect of the midbrain tectum which in light of the aforementioned white matter changes may reflect regions of recent demyelination. Superimposed small acute infarcts would be difficult to exclude, although are felt less likely given the overall constellation of findings. Additionally, there is a 0.7 cm enhancing T2/FLAIR identified in the anterior right thalamus concerning for focus of active demyelination. Clinical correlation with patient history is advised.     2. Unremarkable MRA head or neck without evidence of vascular occlusion.    This report has been flagged in the [facility] medical record.            Electronically signed by: MIKHAIL HATCH  Date:     05/13/17  Time:    06:32     Narrative:    MRI OF THE BRAIN WITHOUT AND WITH IV CONTRAST:     Technique: Precontrast sagittal and axial T1, axial T2, and axial FLAIR and diffusion weighted images of the brain obtained. Then, 10 cc of gadavist was injected intravenously and post gadolinium axial and coronal T1-weighted images obtained.  Noncontrast 3-D time of flight MRA head and postcontrast 3-D time-of-flight MRA head and neck were also performed.     Comparison: CTA head and neck 3/22/2017    Findings:    MRI Brain:     There are numerous foci of T2/FLAIR signal hyperintensity involving the supratentorial white matter as well as in the periventricular white matter and at the callosalseptal interface with outward radiation from the lateral ventricles. There are additional scattered foci of T2/FLAIR hyperintensity identified within the jose as well as the  cerebellum (right hemisphere greater than left). While nonspecific, this configuration and constellation of findings can be seen in the setting of underlying demyelinating process such as multiple sclerosis. Note is made that many of these foci of T2/FLAIR hyperintensity demonstrate corresponding T1 hypointense signal.    There are punctate foci of restricted diffusion involving the right frontal lobe periventricular white matter, right temporal lobe, and left midbrain tectum with corresponding FLAIR hyperintensity. There are foci of increased diffusivity in the right corona radiata and right periatrial white matter. Given the aforementioned white matter findings, these could represent regions of recent demyelination. There is an approximately 0.7 cm enhancing FLAIR hyperintense lesion in the right anterior thalamus which demonstrates no restricted diffusion concerning for focus of active demyelination.    There is no evidence of hydrocephalus. There is generalized cerebral atrophy. There is no evidence of intracranial hemorrhage. The sellar region is unremarkable. The globes appear unremarkable. The major vascular flow voids are patent.    MRA head:    Anterior circulation:  The bilateral distal cervical, yaya, cavernous and supraclinoid segments of the ICA's are patent without evidence of significant focal stenosis or aneurysm. The visualized bilateral anterior and middle cerebral arteries are patent without evidence for significant focal stenosis or aneurysm. There is a left-sided PCOM.    Posterior circulation:  The distal vertebral arteries, basilar artery and posterior cerebral arteries are patent without evidence for significant focal stenosis or aneurysm.    MRA neck: The origins of the right brachycephalic,  left common carotid and left subclavian arteries from the arch are within normal limits. The origins of the vertebral arteries from the subclavian arteries are within normal limits. The vertebral  arteries are unremarkable throughout their course without evidence for focal stenosis or occlusion.    Right carotid: The right common carotid artery, carotid bifurcation and extra-cranial portions of the internal carotid artery are patent without evidence for focal stenosis or occlusion.    Left carotid: The left common carotid artery, carotid bifurcation and extra-cranial portions of the internal carotid artery are patent without evidence for focal stenosis or occlusion.            MRA Neck (Final result)    Abnormal Result time:  05/13/17 06:32:52    Final result by Mikhail Hatch MD (05/13/17 06:32:52)    Impression:         1. Multiple foci of FLAIR hyperintensity in the deep cerebral periventricular white matter in a configuration and distribution which can be seen in the setting of underlying demyelinating process such as multiple sclerosis. There are several punctate foci of restricted diffusion including the left aspect of the midbrain tectum which in light of the aforementioned white matter changes may reflect regions of recent demyelination. Superimposed small acute infarcts would be difficult to exclude, although are felt less likely given the overall constellation of findings. Additionally, there is a 0.7 cm enhancing T2/FLAIR identified in the anterior right thalamus concerning for focus of active demyelination. Clinical correlation with patient history is advised.     2. Unremarkable MRA head or neck without evidence of vascular occlusion.    This report has been flagged in the [facility] medical record.            Electronically signed by: MIKHAIL HATCH  Date:     05/13/17  Time:    06:32     Narrative:    MRI OF THE BRAIN WITHOUT AND WITH IV CONTRAST:     Technique: Precontrast sagittal and axial T1, axial T2, and axial FLAIR and diffusion weighted images of the brain obtained. Then, 10 cc of gadavist was injected intravenously and post gadolinium axial and coronal T1-weighted images obtained.   Noncontrast 3-D time of flight MRA head and postcontrast 3-D time-of-flight MRA head and neck were also performed.     Comparison: CTA head and neck 3/22/2017    Findings:    MRI Brain:     There are numerous foci of T2/FLAIR signal hyperintensity involving the supratentorial white matter as well as in the periventricular white matter and at the callosalseptal interface with outward radiation from the lateral ventricles. There are additional scattered foci of T2/FLAIR hyperintensity identified within the jose as well as the cerebellum (right hemisphere greater than left). While nonspecific, this configuration and constellation of findings can be seen in the setting of underlying demyelinating process such as multiple sclerosis. Note is made that many of these foci of T2/FLAIR hyperintensity demonstrate corresponding T1 hypointense signal.    There are punctate foci of restricted diffusion involving the right frontal lobe periventricular white matter, right temporal lobe, and left midbrain tectum with corresponding FLAIR hyperintensity. There are foci of increased diffusivity in the right corona radiata and right periatrial white matter. Given the aforementioned white matter findings, these could represent regions of recent demyelination. There is an approximately 0.7 cm enhancing FLAIR hyperintense lesion in the right anterior thalamus which demonstrates no restricted diffusion concerning for focus of active demyelination.    There is no evidence of hydrocephalus. There is generalized cerebral atrophy. There is no evidence of intracranial hemorrhage. The sellar region is unremarkable. The globes appear unremarkable. The major vascular flow voids are patent.    MRA head:    Anterior circulation:  The bilateral distal cervical, yaya, cavernous and supraclinoid segments of the ICA's are patent without evidence of significant focal stenosis or aneurysm. The visualized bilateral anterior and middle cerebral arteries  are patent without evidence for significant focal stenosis or aneurysm. There is a left-sided PCOM.    Posterior circulation:  The distal vertebral arteries, basilar artery and posterior cerebral arteries are patent without evidence for significant focal stenosis or aneurysm.    MRA neck: The origins of the right brachycephalic,  left common carotid and left subclavian arteries from the arch are within normal limits. The origins of the vertebral arteries from the subclavian arteries are within normal limits. The vertebral arteries are unremarkable throughout their course without evidence for focal stenosis or occlusion.    Right carotid: The right common carotid artery, carotid bifurcation and extra-cranial portions of the internal carotid artery are patent without evidence for focal stenosis or occlusion.    Left carotid: The left common carotid artery, carotid bifurcation and extra-cranial portions of the internal carotid artery are patent without evidence for focal stenosis or occlusion.            MRA Brain (Final result)    Abnormal Result time:  05/13/17 06:32:52    Final result by Bryn Hatch MD (05/13/17 06:32:52)    Impression:         1. Multiple foci of FLAIR hyperintensity in the deep cerebral periventricular white matter in a configuration and distribution which can be seen in the setting of underlying demyelinating process such as multiple sclerosis. There are several punctate foci of restricted diffusion including the left aspect of the midbrain tectum which in light of the aforementioned white matter changes may reflect regions of recent demyelination. Superimposed small acute infarcts would be difficult to exclude, although are felt less likely given the overall constellation of findings. Additionally, there is a 0.7 cm enhancing T2/FLAIR identified in the anterior right thalamus concerning for focus of active demyelination. Clinical correlation with patient history is advised.     2.  Unremarkable MRA head or neck without evidence of vascular occlusion.    This report has been flagged in the [facility] medical record.            Electronically signed by: MIKHAIL SUH  Date:     05/13/17  Time:    06:32     Narrative:    MRI OF THE BRAIN WITHOUT AND WITH IV CONTRAST:     Technique: Precontrast sagittal and axial T1, axial T2, and axial FLAIR and diffusion weighted images of the brain obtained. Then, 10 cc of gadavist was injected intravenously and post gadolinium axial and coronal T1-weighted images obtained.  Noncontrast 3-D time of flight MRA head and postcontrast 3-D time-of-flight MRA head and neck were also performed.     Comparison: CTA head and neck 3/22/2017    Findings:    MRI Brain:     There are numerous foci of T2/FLAIR signal hyperintensity involving the supratentorial white matter as well as in the periventricular white matter and at the callosalseptal interface with outward radiation from the lateral ventricles. There are additional scattered foci of T2/FLAIR hyperintensity identified within the jose as well as the cerebellum (right hemisphere greater than left). While nonspecific, this configuration and constellation of findings can be seen in the setting of underlying demyelinating process such as multiple sclerosis. Note is made that many of these foci of T2/FLAIR hyperintensity demonstrate corresponding T1 hypointense signal.    There are punctate foci of restricted diffusion involving the right frontal lobe periventricular white matter, right temporal lobe, and left midbrain tectum with corresponding FLAIR hyperintensity. There are foci of increased diffusivity in the right corona radiata and right periatrial white matter. Given the aforementioned white matter findings, these could represent regions of recent demyelination. There is an approximately 0.7 cm enhancing FLAIR hyperintense lesion in the right anterior thalamus which demonstrates no restricted diffusion concerning for  focus of active demyelination.    There is no evidence of hydrocephalus. There is generalized cerebral atrophy. There is no evidence of intracranial hemorrhage. The sellar region is unremarkable. The globes appear unremarkable. The major vascular flow voids are patent.    MRA head:    Anterior circulation:  The bilateral distal cervical, yaya, cavernous and supraclinoid segments of the ICA's are patent without evidence of significant focal stenosis or aneurysm. The visualized bilateral anterior and middle cerebral arteries are patent without evidence for significant focal stenosis or aneurysm. There is a left-sided PCOM.    Posterior circulation:  The distal vertebral arteries, basilar artery and posterior cerebral arteries are patent without evidence for significant focal stenosis or aneurysm.    MRA neck: The origins of the right brachycephalic,  left common carotid and left subclavian arteries from the arch are within normal limits. The origins of the vertebral arteries from the subclavian arteries are within normal limits. The vertebral arteries are unremarkable throughout their course without evidence for focal stenosis or occlusion.    Right carotid: The right common carotid artery, carotid bifurcation and extra-cranial portions of the internal carotid artery are patent without evidence for focal stenosis or occlusion.    Left carotid: The left common carotid artery, carotid bifurcation and extra-cranial portions of the internal carotid artery are patent without evidence for focal stenosis or occlusion.            X-Ray Chest AP Portable (Final result) Result time:  05/13/17 02:07:40    Final result by Evan Flores MD (05/13/17 02:07:40)    Impression:      No acute cardiopulmonary process.  ______________________________________     Electronically signed by resident: EVAN FLORES MD  Date:     05/13/17  Time:    02:05            As the supervising and teaching physician, I personally reviewed the  images and resident's interpretation and I agree with the findings.          Electronically signed by: TORSTEN KOLB MD  Date:     05/13/17  Time:    02:07     Narrative:    Chest Radiograph    Time of Procedure: 05/13/17 01:53:00  Accession # [id number]    Comparison: None    Number of views: 1    Findings:  Lines and tubes: None present.     Lungs and pleural space: Well aerated. No focal consolidation, pleural effusion or pneumothorax.     Mediastinum: Structures are midline. The cardiomediastinal silhouette is normal in size. There is calcification of the aortic arch.    Osseous structures: No significant abnormality.     Upper abdomen: Normal.              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  Started 5/13 before steroids.        Type 2 diabetes mellitus with diabetic polyneuropathy, with long-term current use of insulin [E11.42, Z79.4]  Not Applicable    Demyelinating changes in brain [G37.9]  Yes     By mri.  Several active lesions, many old.      Ataxia [R27.0]  Yes    Amblyopia [H53.009]  Yes    Type 2 diabetes mellitus with hyperglycemia, with long-term current use of insulin [E11.65, Z79.4]  Not Applicable    Obesity [E66.9]  Yes     Chronic    Sleep apnea [G47.30]  Yes     Chronic    Essential hypertension [I10]  Yes     Chronic    Mixed hyperlipidemia [E78.2]  Yes      Chronic      Resolved Hospital Problems    Diagnosis Date Resolved POA   No resolved problems to display.     ASSESSMENT/PLAN:      Internuclear ophthalmoplegia of left eye  - Presented with double vision; initial suspicion for stroke given history. MRI Brain 05/13 with multiple foci concerning for demyelinating process.  - MRA Head/Neck unremarkable for ischemia or significant stenosis.  - TSH WNL; 2D Echo unremarkable.  - General Neurology consulted for MS workup; continuing methylprednisolone 1g IV daily; extending course for additional two doses.  - LP performed 05/14. MS panel negative.  Neurology recommending outpatient follow up and MRI spine which has been ordered.  - vit D level low, started vit D supplement.  - Will follow-up with further neurology recommendations.     DMII with neuropathy, uncontrolled  -  HgbA1c 11.4 on 05/13  - At home, metformin 1000mg PO BID, insulin detemir 42U subQ qHS.  - Remains hyperglycemic secondary to superimposed high-dose steroids on chronic poor baseline control  - increasing insulin regimen as needed with POCT glucose checks      Mixed hyperlipidemia  - Continuing atorvastatin 80mg PO daily.      Essential hypertension  - Continuing valsartan 320mg PO daily.      PT/OT: recommend outpatient PT/OT  Diet: diabetic 2k diet  DVT PPx: SCD, ELIZABETH, start lovenox today, LP was on 5/14  Code Status: Full Code    Dispo:  Likely discharge tomorrow after MRI done today.     Patient seen and discussed with Dr. Elvira Hendricks MD during rounds.    Guanaco Story MD  PGY-1  Pager: [phone]

## 2017-05-17 NOTE — PROGRESS NOTES
"[facility]  Neurology  Progress Note    Patient Name: Bessy Nunez  MRN: [id number]  Admission Date: 5/13/2017  Hospital Length of Stay: 3 days  Code Status: Full Code   Attending Provider: Elvira Hendricks MD  Primary Care Physician: Edgar Nova MD   Principal Problem:Internuclear ophthalmoplegia of left eye      Subjective:     Interval History:   Vision / L JAYDEN / confusion improving.  S/p 5.5g solumedrol.    From Consult 5/13/17:  58 yo M that I'm asked to see for possible MS.  Wife at bedside.  Came to ER today because of acute onset of diplopia Wednesday morning as well as ataxia "like i'm drunk."  Mri suggestive of demyelinating lesions (old and new).  Says he was told he had a stroke 16 years ago, but really "no one knew what was going on."  No symptoms since.  Since being in hospital, he has gotten confused and wandered out of obs unit looking for wife.  He is oriented currently.      Current Facility-Administered Medications   Medication Dose Route Frequency Provider Last Rate Last Dose    0.9%  NaCl infusion   Intravenous Continuous Emily Rainey  mL/hr at 05/17/17 1248      acetaminophen tablet 650 mg  650 mg Oral Q6H PRN Fabio Becerra MD        aspirin EC tablet 81 mg  81 mg Oral Daily Georgette Leon, NP   81 mg at 05/17/17 0847    atorvastatin tablet 80 mg  80 mg Oral Daily Georgette Leon, NP   80 mg at 05/17/17 0848    dextrose 50% injection 12.5 g  12.5 g Intravenous PRN Georgette Leon, NP        dextrose 50% injection 25 g  25 g Intravenous PRN Georgette Leon, NP        diltiaZEM 24 hr capsule 240 mg  240 mg Oral Daily Georgette Leon, NP   240 mg at 05/17/17 0847    enoxaparin injection 40 mg  40 mg Subcutaneous Daily Guanaco Story MD        glucagon (human recombinant) injection 1 mg  1 mg Intramuscular PRN Georgette Leon, NP        glucose chewable tablet 16 g  16 g Oral PRN Georgette Leon, NP        glucose chewable tablet " 24 g  24 g Oral PRN Georgette Leon NP        insulin aspart pen 1-10 Units  1-10 Units Subcutaneous QID (AC + HS) PRN Georgette Leon NP   8 Units at 05/17/17 0852    insulin aspart pen 22 Units  22 Units Subcutaneous TIDWM Guanaco Story MD   22 Units at 05/17/17 1245    insulin detemir pen 32 Units  32 Units Subcutaneous BID Guanaco Story MD   32 Units at 05/17/17 0853    lidocaine (PF) 10 mg/ml (1%) injection 10 mg  1 mL Other Once [place] Mary Beth Quan MD        quetiapine tablet 25 mg  25 mg Oral Nightly PRN BRIANA Hall MD        ramelteon tablet 8 mg  8 mg Oral Nightly PRN Anselmo Dumont MD   8 mg at 05/15/17 2058    sertraline tablet 150 mg  150 mg Oral Daily Georgette Leon NP   150 mg at 05/17/17 0847    sodium chloride 0.9% bolus 500 mL  500 mL Intravenous Continuous PRN Georgette Leon NP        sodium chloride 0.9% flush 3 mL  3 mL Intravenous Q8H Georgette Leon NP   3 mL at 05/16/17 2024    thiamine tablet 100 mg  100 mg Oral Daily Guanaco Story MD   100 mg at 05/17/17 0847    valsartan tablet 320 mg  320 mg Oral Daily Guanaco Story MD   320 mg at 05/17/17 0847    vitamin D 1000 units tablet 5,000 Units  5,000 Units Oral Daily Guanaco Story MD   5,000 Units at 05/17/17 0947       Review of Systems   Eyes: Positive for visual disturbance.   Respiratory: Negative for shortness of breath.    Cardiovascular: Negative for chest pain.   Gastrointestinal: Negative for constipation, diarrhea, nausea and vomiting.   Neurological: Negative for weakness.   Psychiatric/Behavioral: Positive for confusion. Negative for agitation. The patient is not nervous/anxious.      Objective:     Vital Signs (Most Recent):  Temp: 98 °F (36.7 °C) (05/17/17 1605)  Pulse: 93 (05/17/17 1605)  Resp: 18 (05/17/17 1605)  BP: (!) 177/86 (05/17/17 1605)  SpO2: 96 % (05/17/17 1605) Vital Signs (24h Range):  Temp:  [97.5 °F (36.4 °C)-98.8 °F (37.1 °C)] 98 °F (36.7  °C)  Pulse:  [] 93  Resp:  [16-20] 18  SpO2:  [93 %-96 %] 96 %  BP: (153-184)/(77-91) 177/86     Weight: 93.6 kg (206 lb 5.6 oz)  Body mass index is 29.61 kg/(m^2).    Physical Exam        General appearance: Well nourished, well developed, no acute distress.   Cardiovascular: pedal pulses 2, no edema or cyanosis, heart regular rate and rhythym, no carotid bruits.   -------------------------------------------------------------  Facial Expression: normal   Affect: full   Orientation to time & place: Oriented to time, place, person and situation   Attention & concentration: Normal attention span and concentration   Memory: Recent and remote memory intact  Language: Spontaneous, fluent; able to repeat and name objects.  However, when allowed to talk, he demonstrates subtle aphasia (improving).  Fund of knowledge: Aware of current events   Speech: normal (not dysarthric)  -------------------------------------------------------  Cranial nerves: normal visual acuity, visual fields full, optic discs not well visualized due small pupils, pupils equal round and reactive, extraocular movements intact when separately tested, but clear L JAYDEN (improving).  Facial sensation intact, face symmetrical, hearing intact to whisper, palate raises midline, shoulder shrug strength normal, tongue protrudes midline.   -------------------------------------------------------  Musculoskeletal  Muscle strength: 5/5 in BUE.  4+/5 in LLE, 4+/5 RLE   No pronator drift  Sensation: Intact to light touch in all extremities   --------------------------------------------------------------  Cerebellar and Coordination  Gait: normal, able to tandem without difficulty   Finger-nose: +dysmetria, mild, on left (improving)  --------------------------------------------------------------  MOVEMENT DISORDERS FOCUSED EXAM  Abnormality of movement (bradykinesia, hyperkinesia) present? No   Tremor present? Yes.  Fine tremor noted in BUE   Posture:  normal  Postural stability: no Romberg    Significant Labs:   CBC:   No results for input(s): WBC, HGB, HCT, PLT in the last 48 hours.  CMP:     Recent Labs  Lab 05/17/17  0527   *      K 3.5      CO2 24   BUN 21*   CREATININE 1.0   CALCIUM 8.2*   MG 1.9   ANIONGAP 11   EGFRNONAA >60.0     Inflammatory Markers: No results for input(s): SEDRATE, CRP, PROCAL in the last 48 hours.    Significant Imaging: I have reviewed all pertinent imaging results/findings within the past 24 hours.     MRI brain 5/13/17:  Multiple foci of FLAIR hyperintensity in the deep cerebral periventricular white matter in a configuration and distribution which can be seen in the setting of underlying demyelinating process such as multiple sclerosis. There are several punctate foci of restricted diffusion including the left aspect of the midbrain tectum which in light of the aforementioned white matter changes may reflect regions of recent demyelination. Superimposed small acute infarcts would be difficult to exclude, although are felt less likely given the overall constellation of findings. Additionally, there is a 0.7 cm enhancing T2/FLAIR identified in the anterior right thalamus concerning for focus of active demyelination.           Also old lesions in corpus collosum      Assessment and Plan:     * Internuclear ophthalmoplegia of left eye  Acute diplopia (2/2 left JAYDEN) in the setting of new, enhancing lesion in right thalamus, which is radiographically consistent with demyelinating disease    5/13 started steroids  5/14 LP    Improving s/p 5.5 g solumedrol      Demyelinating changes in brain  MRI with active lesion in thalamus, with concern for MS  - F/u REYMUNDO (wnl), NMO, ACE  - Continue vitamin D 5K u daily.  Vitamin D 11 this admission.  - s/p 5.5g solumedrol  - CSF results from 5/14/17 show zero oligoclonal bands, and normal IgG  - MRI C/T/L WWO - ok to do as an outpatient if needed; recommend having completed prior to  outpatient neuro/MS f/u      Ataxia  Ataxia of gait since 5/10.  Improving s/p solumedrol.   -> PT/OT    -> MS work-up as above    Acute confusional state  Intermittent confusion, disorientation, which started 5/13 before steroids.  - B12 wnl.  B1 69.  Continue empiric thiamine for possible Wernicke's aphasia; speech is improving with B1 administration.  - F/u RPR      VTE Risk Mitigation         Ordered     enoxaparin injection 40 mg  Daily     Route:  Subcutaneous        05/17/17 1649     High Risk of VTE  Once      05/13/17 0259     Reason for No Pharmacological VTE Prophylaxis  Once      05/13/17 0259     Place sequential compression device  Until discontinued      05/13/17 0259          Sharon Luong MD  Neurology  [facility]

## 2017-05-17 NOTE — PLAN OF CARE
Problem: Diabetes, Type 2 (Adult)  Goal: Signs and Symptoms of Listed Potential Problems Will be Absent, Minimized or Managed (Diabetes, Type 2)  Signs and symptoms of listed potential problems will be absent, minimized or managed by discharge/transition of care (reference Diabetes, Type 2 (Adult) CPG).   Outcome: Ongoing (interventions implemented as appropriate)  Patient awake, alert, and oriented. Patient's vitals remain stable. Patient remains free from falls/injury throughout shift. Patient denies pain this shift. Accu checks done HS with scheduled and sliding scale insulin given per MD orders. Patient continued on cardiac monitoring per MD orders. Neuro checks done Q4hrs per MD orders. Normal saline infusing at rate of 150 continuously and solumedrol ivpb given per MD orders. Will continue to monitor.

## 2017-05-17 NOTE — PLAN OF CARE
Problem: Patient Care Overview  Goal: Plan of Care Review  Outcome: Ongoing (interventions implemented as appropriate)  VSS and afebrile this shift. Pt free from falls and safe. Pt has remained AOx4 this shift and exhibited no signs of disorientation.     Pt has denied pain and maintained continuous NS infusion.     Pt currently in Radio for MRI at time of this note.

## 2017-05-17 NOTE — PLAN OF CARE

## 2017-05-17 NOTE — ASSESSMENT & PLAN NOTE
Acute diplopia (2/2 left JAYDEN) in the setting of new, enhancing lesion in right thalamus, which is radiographically consistent with demyelinating disease    5/13 started steroids  5/14 LP    Improving s/p 5.5 g solumedrol

## 2017-05-18 VITALS
HEART RATE: 104 BPM | RESPIRATION RATE: 17 BRPM | WEIGHT: 206.38 LBS | SYSTOLIC BLOOD PRESSURE: 181 MMHG | OXYGEN SATURATION: 96 % | TEMPERATURE: 98 F | DIASTOLIC BLOOD PRESSURE: 92 MMHG | HEIGHT: 70 IN | BODY MASS INDEX: 29.54 KG/M2

## 2017-05-18 PROBLEM — F05 ACUTE CONFUSIONAL STATE: Status: RESOLVED | Noted: 2017-05-14 | Resolved: 2017-05-18

## 2017-05-18 LAB
ALBUMIN SERPL BCP-MCNC: 3 G/DL
ALP SERPL-CCNC: 67 U/L
ALT SERPL W/O P-5'-P-CCNC: 26 U/L
ANION GAP SERPL CALC-SCNC: 11 MMOL/L
AST SERPL-CCNC: 22 U/L
BILIRUB SERPL-MCNC: 0.8 MG/DL
BUN SERPL-MCNC: 25 MG/DL
CALCIUM SERPL-MCNC: 7.8 MG/DL
CHLORIDE SERPL-SCNC: 104 MMOL/L
CO2 SERPL-SCNC: 25 MMOL/L
CREAT SERPL-MCNC: 1.1 MG/DL
EST. GFR  (AFRICAN AMERICAN): >60 ML/MIN/1.73 M^2
EST. GFR  (NON AFRICAN AMERICAN): >60 ML/MIN/1.73 M^2
GLUCOSE SERPL-MCNC: 340 MG/DL
NMO/AQP4 FACS,S: NEGATIVE
POCT GLUCOSE: 296 MG/DL (ref 70–110)
POCT GLUCOSE: 314 MG/DL (ref 70–110)
POTASSIUM SERPL-SCNC: 3.1 MMOL/L
PROT SERPL-MCNC: 6.1 G/DL
RPR SER QL: NORMAL
SODIUM SERPL-SCNC: 140 MMOL/L
VDRL CSF QL: NORMAL
VDRL CSF QL: NORMAL

## 2017-05-18 PROCEDURE — 97530 THERAPEUTIC ACTIVITIES: CPT

## 2017-05-18 PROCEDURE — 97112 NEUROMUSCULAR REEDUCATION: CPT

## 2017-05-18 PROCEDURE — 80053 COMPREHEN METABOLIC PANEL: CPT

## 2017-05-18 PROCEDURE — G8989 SELF CARE D/C STATUS: HCPCS | Mod: CJ

## 2017-05-18 PROCEDURE — 97535 SELF CARE MNGMENT TRAINING: CPT

## 2017-05-18 PROCEDURE — 97110 THERAPEUTIC EXERCISES: CPT

## 2017-05-18 PROCEDURE — 36415 COLL VENOUS BLD VENIPUNCTURE: CPT

## 2017-05-18 PROCEDURE — 25000003 PHARM REV CODE 250: Performed by: STUDENT IN AN ORGANIZED HEALTH CARE EDUCATION/TRAINING PROGRAM

## 2017-05-18 PROCEDURE — G8988 SELF CARE GOAL STATUS: HCPCS | Mod: CI

## 2017-05-18 PROCEDURE — 25000003 PHARM REV CODE 250: Performed by: NURSE PRACTITIONER

## 2017-05-18 PROCEDURE — 99233 SBSQ HOSP IP/OBS HIGH 50: CPT | Mod: ,,, | Performed by: PSYCHIATRY & NEUROLOGY

## 2017-05-18 RX ORDER — POTASSIUM CHLORIDE 20 MEQ/1
60 TABLET, EXTENDED RELEASE ORAL ONCE
Status: COMPLETED | OUTPATIENT
Start: 2017-05-18 | End: 2017-05-18

## 2017-05-18 RX ORDER — METHYLPREDNISOLONE 4 MG/1
TABLET ORAL
Qty: 1 PACKAGE | Refills: 0 | Status: ON HOLD | OUTPATIENT
Start: 2017-05-18 | End: 2017-06-04 | Stop reason: HOSPADM

## 2017-05-18 RX ORDER — CHOLECALCIFEROL (VITAMIN D3) 25 MCG
5000 TABLET ORAL DAILY
COMMUNITY
Start: 2017-05-18

## 2017-05-18 RX ORDER — POTASSIUM CHLORIDE 20 MEQ/1
60 TABLET, EXTENDED RELEASE ORAL DAILY
Status: DISCONTINUED | OUTPATIENT
Start: 2017-05-18 | End: 2017-05-18

## 2017-05-18 RX ADMIN — INSULIN ASPART 6 UNITS: 100 INJECTION, SOLUTION INTRAVENOUS; SUBCUTANEOUS at 12:05

## 2017-05-18 RX ADMIN — ATORVASTATIN CALCIUM 80 MG: 20 TABLET, FILM COATED ORAL at 08:05

## 2017-05-18 RX ADMIN — ASPIRIN 81 MG: 81 TABLET, COATED ORAL at 08:05

## 2017-05-18 RX ADMIN — POTASSIUM CHLORIDE 60 MEQ: 1500 TABLET, EXTENDED RELEASE ORAL at 11:05

## 2017-05-18 RX ADMIN — INSULIN ASPART 22 UNITS: 100 INJECTION, SOLUTION INTRAVENOUS; SUBCUTANEOUS at 07:05

## 2017-05-18 RX ADMIN — THIAMINE HCL TAB 100 MG 100 MG: 100 TAB at 08:05

## 2017-05-18 RX ADMIN — DILTIAZEM HYDROCHLORIDE 240 MG: 240 CAPSULE, COATED, EXTENDED RELEASE ORAL at 08:05

## 2017-05-18 RX ADMIN — POTASSIUM CHLORIDE 60 MEQ: 1500 TABLET, EXTENDED RELEASE ORAL at 08:05

## 2017-05-18 RX ADMIN — INSULIN ASPART 22 UNITS: 100 INJECTION, SOLUTION INTRAVENOUS; SUBCUTANEOUS at 12:05

## 2017-05-18 RX ADMIN — VITAMIN D, TAB 1000IU (100/BT) 5000 UNITS: 25 TAB at 08:05

## 2017-05-18 RX ADMIN — SERTRALINE HYDROCHLORIDE 150 MG: 50 TABLET ORAL at 08:05

## 2017-05-18 RX ADMIN — VALSARTAN 320 MG: 160 TABLET, FILM COATED ORAL at 11:05

## 2017-05-18 RX ADMIN — INSULIN ASPART 8 UNITS: 100 INJECTION, SOLUTION INTRAVENOUS; SUBCUTANEOUS at 07:05

## 2017-05-18 RX ADMIN — Medication 3 ML: at 08:05

## 2017-05-18 NOTE — PLAN OF CARE
Problem: Patient Care Overview  Goal: Plan of Care Review  Outcome: Outcome(s) achieved Date Met:  05/18/17  VSS and afebrile. Pt free from falls and safe. Pt remained AOx4.      Pt to receive AVS and pertinent F/U info.     Pt to home c/ personal transport c/ spouse.

## 2017-05-18 NOTE — PLAN OF CARE
Problem: Patient Care Overview  Goal: Plan of Care Review  Outcome: Ongoing (interventions implemented as appropriate)  Pt free of falls/injuries throughout the shift. Bed locked, call bell within reach. Pain assessed & treated, VSS, pt in no distress, will continue to monitor.

## 2017-05-18 NOTE — ASSESSMENT & PLAN NOTE
MRI with active lesion in thalamus, with concern for MS  - F/u REYMUNDO (wnl), NMO (wnl), ACE  - Continue vitamin D 5K u daily.  Vitamin D 11 this admission.  - s/p 5.5g solumedrol  - CSF results from 5/14/17 show zero oligoclonal bands, and normal IgG  - MRI C/T/L WWO negative for additional active demyelinating lesions; possible prior lesions seen in jose/L cerebellum.  Renal cyst also noted.  - Outpatient f/u in the MS clinic next week.  TONY virus pending.

## 2017-05-18 NOTE — PT/OT/SLP PROGRESS
"Occupational Therapy  Treatment    Bessy Nunez   MRN: [id number]   Admitting Diagnosis: Internuclear ophthalmoplegia of left eye    OT Date of Treatment: 17   OT Start Time: 1015  OT Stop Time: 1109  OT Total Time (min): 54 min    Billable Minutes:  Self Care/Home Management 10, Therapeutic Activity 12, Therapeutic Exercise 10 and Neuromuscular Re-education 12    General Precautions: Standard, aspiration, fall  Orthopedic Precautions: N/A  Braces: N/A    Do you have any cultural, spiritual, Jehovah's witness conflicts, given your current situation?: Anabaptism    Subjective:  Communicated with nurse prior to session.  Patient:  "I worry about not being able to recognize if I am having another episode or if is just a normal slip or being off balance."  Wife:  "He is going for a kidney ultrasound today."  Pain Ratin/10   Pain Rating Post-Intervention: 0/10    Objective:  Patient found with: peripheral IV, telemetry  Wife present.  MD present during portion of session.    Functional Mobility:  Bed Mobility:  Rolling/Turning to Left: Modified independent  Rolling/Turning Right: Modified independent  Scooting/Bridging: Modified Independent  Supine to Sit: Modified Independent  Sit to Supine: Modified Independent    Transfers:   Sit <> Stand Assistance: Stand By Assistance  Sit <> Stand Assistive Device: No Assistive Device  Bed <> Chair Technique: Stand Pivot  Bed <> Chair Transfer Assistance: Stand By Assistance    Functional Ambulation: CGA    Activities of Daily Living:  UE Dressing Level of Assistance: Stand by assistance  LE Dressing Level of Assistance: Contact guard assistance for standing balance   Grooming Position: Standing  Grooming Level of Assistance: Stand by assistance  Toileting Where Assessed: Toilet  Toileting Level of Assistance: Stand by assistance       Additional Treatment:   Patient education provided on role of OT, core stabilization, fall prevention and handwriting HEP. Patient verbalizing " understanding via teach back method. Continued education, patient/ family training recommended. Patient's functional status and disposition recommendation discussed with patient. Patient alert and oriented x 3; able to follow 4/4 one step commands. Addressed tracing exercises in preparation for handwriting. Addressed core stabilization; min-mod assist required during unilateral stance; ws to the right. Patient attentive and interactive throughout the session. Able to recall 3/3 words following 5 minutes.  White board updated in patient's room. OT asked if there were any other questions; patient/ family had no further questions.    AM-PAC 6 CLICK ADL   How much help from another person does this patient currently need?   1 = Unable, Total/Dependent Assistance  2 = A lot, Maximum/Moderate Assistance  3 = A little, Minimum/Contact Guard/Supervision  4 = None, Modified [place]/Independent    Putting on and taking off regular lower body clothing? : 3  Bathing (including washing, rinsing, drying)?: 3  Toileting, which includes using toilet, bedpan, or urinal? : 3  Putting on and taking off regular upper body clothing?: 3  Taking care of personal grooming such as brushing teeth?: 3  Eating meals?: 4  Total Score: 19     AM-PAC Raw Score CMS G-Code Modifier Level of Impairment Assistance   6 % Total / Unable   7 - 9 CM 80 - 100% Maximal Assist   10 - 14 CL 60 - 80% Moderate Assist   15 - 19 CK 40 - 60% Moderate Assist   20 - 22 CJ 20 - 40% Minimal Assist   23 CI 1-20% SBA / CGA   24 CH 0% Independent/ Mod I     Patient left supine with all lines intact and call button in reach    Assessment:  Bessy Nunez is a 57 y.o. male with a medical diagnosis of Diplopia and presents with performance deficits of physical skills including impaired balance, mobility, gross motor coordination, sensation (specifically viisual); demonstrating performance deficits of cognitive skills including impaired problem solving, and  memory all resulting in inability organizing occupational performance in a timely and safe manner. These performance deficits have resulted in activity limitations including but not limited to: transfers, ascending/ descending stairs, walking short and long distances, walking around obstacles, transitional movement patterns (kneeling, bending); upper body dressing, lower body dressing, brushing teeth, toileting, bathing, carrying objects, cutting grass and driving. Patient's role as , father and independent caretaker for self has been affected. Patient will benefit from skilled OT services to maximize level of independence with self-care skills and functional mobility. Will benefit from outpt OT.    Rehab identified problem list/impairments: Rehab identified problem list/impairments: weakness, impaired self care skills, impaired balance, decreased coordination, decreased safety awareness, impaired endurance, impaired functional mobilty, gait instability, impaired cognition, impaired fine motor, impaired coordination    Rehab potential is good.    Activity tolerance: Good    Discharge recommendations: Discharge Facility/Level Of Care Needs: outpatient OT     Barriers to discharge: Barriers to Discharge: None    Equipment recommendations: bath bench     GOALS:   Occupational Therapy Goals        Problem: Occupational Therapy Goal    Goal Priority Disciplines Outcome Interventions   Occupational Therapy Goal     OT, PT/OT     Description:  Goals set 5/13 to be addressed for 7 days with expiration date, 5/20:  Patient will increase functional independence with ADLs by performing:  Patient will demonstrate stand pivot transfers with modified independence.   Not met  Patient will demonstrate grooming while standing with modified independence.   Not met  Patient will demonstrate upper body dressing with modified independence.   Not met  Patient will demonstrate lower body dressing with modified independence.   Not  met  Patient will demonstrate toileting with modified independence.   Not met  Patient will demonstrate bathing while seated EOB with modified independence.   Not met  Patient's family / caregiver will demonstrate independence and safety with assisting patient with self-care skills and functional mobility.     Not met  Patient and/or patient's family will verbalize understanding of stroke prevention guidelines, personal risk factors and stroke warning signs via teachback method.  Not met                     Plan:  Patient to be seen 6 x/week to address the above listed problems via self-care/home management, therapeutic activities, therapeutic exercises, sensory integration, cognitive retraining, neuromuscular re-education  Plan of Care expires: 06/11/17  Plan of Care reviewed with: patient, spouse         Guillermina COLTON Avelar  05/18/2017

## 2017-05-18 NOTE — DISCHARGE SUMMARY
DISCHARGE SUMMARY  Hospital Medicine    Team: [facility] HOSP MED 3    Patient Name: Bessy Nunez  YOB: 1960    Admit Date: 5/13/2017    Discharge Date: 05/18/2017    Discharge Attending Physician: Elvira Hendricks MD     Primary Diagnosis: Internuclear ophthalmoplegia of left eye    Secondary Diagnoses:  Patient Active Problem List   Diagnosis    Depressive disorder, not elsewhere classified    Mixed hyperlipidemia    Essential hypertension    Lipodystrophy    Type 2 diabetes mellitus with hyperglycemia, with long-term current use of insulin    NAFLD (nonalcoholic fatty liver disease)    Obesity    ED (erectile dysfunction)    Sleep apnea    Hx of colonic polyp    Left facial numbness    Internuclear ophthalmoplegia of left eye    Amblyopia    Demyelinating changes in brain    Ataxia    Type 2 diabetes mellitus with diabetic polyneuropathy, with long-term current use of insulin       Discharged Condition: admit problems have stabilized    HOSPITAL COURSE:    Initial Presentation:     Patient is a 57 y.o. male with significant past medical history of DM II, HTN, HLD, CHASE, and previous stroke presented to hospital complaining of acute onset diplopia that began yesterday morning. The patient states he was in his usual state of health when he awoke from a nap with double vision. He states as he attempted to ambulate, he would walk sideways and into walls secondary to his altered vision. The patient denies HA, numbness, change in speech, CP, or N/V. He endorses dizziness that is relieved by closing one eye. The patient associates his current symptoms to symptoms he had with a previous stroke. He has no residual deficits from that event.  ED workup revealed elevated glucose 383, elevated cholesterol 380, elevated triglycerides 1246. Patient has diplopia, left hemianopsia, left partial gaze palsy (to the right). MRI brain pending. Patient will be admitted to Vascular Neurology for further  evaluation.    Course of Principle Problem for Admission:    5/13: MRA brain/neck, MRI brain w/wo contrast show no vascular occlusion, multiple foci of hyperintensity in deep cerebral periventricular white matter in pattern of demyelinating process.  Stroke ruled out.  Solumedrol started.  5/14: LP performed, CSF labs sent.  Solumedrol continued.  Patient states vision mildly improved.  5/15: Labs pending, solumedrol continued.  5/16: Medical error patient received double dose of solumedrol, disclosed to patient.  Vitamin D level checked, found to be low, supplements started.  5/17: MRI spine performed, no spinal cord lesions.    Other Medical Problems Addressed in the Hospital:    DMII with neuropathy, uncontrolled  -  HgbA1c 11.4 on 05/13  - At home, metformin 1000mg PO BID, insulin detemir 42U subQ qHS.  - Remains hyperglycemic secondary to superimposed high-dose steroids on chronic poor baseline control  - increasing insulin regimen as needed with POCT glucose checks      Mixed hyperlipidemia  - Continuing atorvastatin 80mg PO daily.      Essential hypertension  - Continuing valsartan 320mg PO daily.    Incidental Right Renal Lesion  - found on MRI spine, possibly proteinaceous or hemorrhagic cyst  - Renal US: Grossly stable bilateral simple and minimally complicated renal cysts. RIGHT nonobstructive nephrolithiasis.    CONSULTS: Vascular Neurology, Neurology, Nutrition    Other Pertinent Lab Findings:    ACE level 23  REYMUNDO negative <1:160  NMO Aquaporin-4-IgG pending  MS profile:  CSF Bands bands 0 0     Olig Bands Interpretation, CSF <4 bands 0 0CM   Comments: The oligoclonal band assay detected 3 or less IgG bands in   the CSF, which are not present in the serum. This is a   Negative result.       Serum Bands bands 0 0    IgG Index, CSF <=0.85 0.57 0.57    IgG, CSF <=8.1 mg/dL 6.6 6.6    Albumin, CSF <=27.0 mg/dL 51.4 (H) 51.4 (H)    IgG/Albumin Ratio, CSF <=0.21 0.13 0.13    IgG Synthetic Rate <=12 mg/24 h  5.49 5.49    IgG,S 767 - 1590 mg/dL 891 891    Albumin, Serum 3200 - 4800 mg/dL 3820 3820    IgG/Albumin, S <=0.40 0.23 0.23CM     Pertinent/Significant Diagnostic Studies:    MRA Brain/Neck, MRI Brain w/wo Contrast 5/13/2017  1. Multiple foci of FLAIR hyperintensity in the deep cerebral periventricular white matter in a configuration and distribution which can be seen in the setting of underlying demyelinating process such as multiple sclerosis. There are several punctate foci of restricted diffusion including the left aspect of the midbrain tectum which in light of the aforementioned white matter changes may reflect regions of recent demyelination. Superimposed small acute infarcts would be difficult to exclude, although are felt less likely given the overall constellation of findings. Additionally, there is a 0.7 cm enhancing T2/FLAIR identified in the anterior right thalamus concerning for focus of active demyelination. Clinical correlation with patient history is advised.     2. Unremarkable MRA head or neck without evidence of vascular occlusion.    MRI Spine w/wo Contrast 5/17/2017  Normal MRI evaluation of the distal spinal cord and cauda equina without imaging findings to suggest demyelinating process.    Minimal lumbar degenerative changes without spinal canal stenosis or neural foraminal narrowing.    Indeterminate right renal lesion, possibly a proteinaceous or hemorrhagic cyst.  Recommend further evaluation with renal ultrasound.    Special Treatments/Procedures:   Solumedrol IV daily x5 days    Disposition:  Home    Future Scheduled Appointments:  Future Appointments  Date Time Provider Department Center   6/30/2017 2:00 PM Jez Espinoza [facility]       Follow-up Plans from This Hospitalization:  -Follow up with Dr. Love with Neurology.  -Follow up with [facility] in one week for diabetes medication adjustments given that patient has uncontrolled diabetes (likely due to insurance  issues and not being able to afford medicine), received solumedrol as inpatient for 5 days, and is going home with medrol pack that'll last another 5 days.    Last 24hour Physical Exam  Constitutional: He is oriented to person, place, and time. He appears well-developed and well-nourished. Anxious.  HENT:   Head: Normocephalic and atraumatic.   Mouth/Throat: Oropharynx is clear and moist.   Eyes: Conjunctivae are normal. Pupils are equal, round, and reactive to light.   Cardiovascular: Normal rate, regular rhythm, normal heart sounds and intact distal pulses.   Pulmonary/Chest: Effort normal and breath sounds normal.   Abdominal: Soft. Bowel sounds are normal. He exhibits no distension. There is no tenderness.   Musculoskeletal: Normal range of motion.   LLE 4+/5 strength; otherwise 5/5  Disconjugate gaze when looking right.  Neurological: He is alert and oriented to person, place, and time.   Skin: Skin is warm and dry.     Last CBC/BMP/HgbA1c (if applicable):  Recent Results (from the past 336 hour(s))   CBC auto differential    Collection Time: 05/15/17  3:48 AM   Result Value Ref Range    WBC 9.79 3.90 - 12.70 K/uL    Hemoglobin 13.7 (L) 14.0 - 18.0 g/dL    Hematocrit 39.2 (L) 40.0 - 54.0 %    Platelets 182 150 - 350 K/uL   CBC auto differential    Collection Time: 05/14/17  4:56 AM   Result Value Ref Range    WBC 9.48 3.90 - 12.70 K/uL    Hemoglobin 13.5 (L) 14.0 - 18.0 g/dL    Hematocrit 38.0 (L) 40.0 - 54.0 %    Platelets 164 150 - 350 K/uL   CBC W/ AUTO DIFFERENTIAL    Collection Time: 05/13/17  1:38 AM   Result Value Ref Range    WBC 6.28 3.90 - 12.70 K/uL    Hemoglobin 15.2 14.0 - 18.0 g/dL    Hematocrit 41.4 40.0 - 54.0 %    Platelets 190 150 - 350 K/uL     Recent Results (from the past 336 hour(s))   Basic metabolic panel    Collection Time: 05/17/17  5:27 AM   Result Value Ref Range    Sodium 137 136 - 145 mmol/L    Potassium 3.5 3.5 - 5.1 mmol/L    Chloride 102 95 - 110 mmol/L    CO2 24 23 - 29 mmol/L     BUN, Bld 21 (H) 6 - 20 mg/dL    Creatinine 1.0 0.5 - 1.4 mg/dL    Calcium 8.2 (L) 8.7 - 10.5 mg/dL    Anion Gap 11 8 - 16 mmol/L   Basic metabolic panel    Collection Time: 05/14/17  5:36 PM   Result Value Ref Range    Sodium 135 (L) 136 - 145 mmol/L    Potassium 4.3 3.5 - 5.1 mmol/L    Chloride 105 95 - 110 mmol/L    CO2 18 (L) 23 - 29 mmol/L    BUN, Bld 22 (H) 6 - 20 mg/dL    Creatinine 1.5 (H) 0.5 - 1.4 mg/dL    Calcium 8.9 8.7 - 10.5 mg/dL    Anion Gap 12 8 - 16 mmol/L     Lab Results   Component Value Date    HGBA1C 11.4 (H) 05/13/2017       Discharge Medication List:     Medication List      START taking these medications          vitamin D 1000 units Tab   Take 5 tablets (5,000 Units total) by mouth once daily.         CHANGE how you take these medications          sertraline 100 MG tablet   Commonly known as:  ZOLOFT   1 and half tablet daily   What changed:    - how much to take  - how to take this  - when to take this  - additional instructions         CONTINUE taking these medications          aspirin 81 MG EC tablet   Commonly known as:  ECOTRIN       atenolol 50 MG tablet   Commonly known as:  TENORMIN   Take 1 tablet (50 mg total) by mouth once daily.       atorvastatin 40 MG tablet   Commonly known as:  LIPITOR   Take 1 tablet (40 mg total) by mouth once daily.       diltiaZEM 240 MG Cdcr   Commonly known as:  DILACOR XR   Take 1 capsule (240 mg total) by mouth once daily.       insulin degludec 200 unit/mL (3 mL) Inpn   Commonly known as:  TRESIBA FLEXTOUCH U-200   Inject 42 Units into the skin every evening.       liraglutide 0.6 mg/0.1 mL (18 mg/3 mL) subq PNIJ 0.6 mg/0.1 mL (18 mg/3 mL) Pnij   Commonly known as:  VICTOZA 3-TOMASZ   Inject 1.8 mg into the skin once daily.       metformin 500 MG 24 hr tablet   Commonly known as:  GLUCOPHAGE-XR   Take 2 tablets (1,000 mg total) by mouth 2 (two) times daily with meals.       omega-3 fatty acids-vitamin E 1,000 mg Cap   Commonly known as:  FISH OIL  "  Take 1 capsule by mouth 2 (two) times daily.       pen needle, diabetic 32 gauge x 5/32" Ndle   Commonly known as:  BD ULTRA-FINE ANA PEN NEEDLES   Inject twice daily       valsartan 320 MG tablet   Commonly known as:  DIOVAN   Take 1 tablet (320 mg total) by mouth once daily.            Where to Get Your Medications      You can get these medications from any pharmacy     You don't need a prescription for these medications     vitamin D 1000 units Tab             Patient Instructions:    Discharge Procedure Orders  Ambulatory Referral to Endocrinology   Referral Priority: Routine Referral Type: Consultation   Referred to Provider: JOAN ARIAS Requested Specialty: Endocrinology   Number of Visits Requested: 1      Ambulatory Referral to Neurology   Referral Priority: Routine Referral Type: Consultation   Referral Reason: Specialty Services Required    Referred to Provider: MANJINDER NETTLES Requested Specialty: Neurology   Number of Visits Requested: 1          Guanaco Story MD  [id number]  Pager: [phone]    "

## 2017-05-18 NOTE — PT/OT/SLP DISCHARGE
Occupational Therapy Discharge Summary    Bessy Nunez  MRN: [id number]   Internuclear ophthalmoplegia of left eye   Patient Discharged from acute Occupational Therapy on 5/18.  Please refer to prior OT note dated on 5/18 for functional status.     Assessment:   Patient appropriate for care in another setting.  GOALS:   Occupational Therapy Goals        Problem: Occupational Therapy Goal    Goal Priority Disciplines Outcome Interventions   Occupational Therapy Goal     OT, PT/OT     Description:  Goals set 5/13 to be addressed for 7 days with expiration date, 5/20:  Patient will increase functional independence with ADLs by performing:  Patient will demonstrate stand pivot transfers with modified independence.   Not met  Patient will demonstrate grooming while standing with modified independence.   Not met  Patient will demonstrate upper body dressing with modified independence.   Not met  Patient will demonstrate lower body dressing with modified independence.   Not met  Patient will demonstrate toileting with modified independence.   Not met  Patient will demonstrate bathing while seated EOB with modified independence.   Not met  Patient's family / caregiver will demonstrate independence and safety with assisting patient with self-care skills and functional mobility.     Not met  Patient and/or patient's family will verbalize understanding of stroke prevention guidelines, personal risk factors and stroke warning signs via teachback method.  Not met                   Reasons for Discontinuation of Therapy Services  Transfer to alternate level of care.      Plan:  Patient Discharged to: Outpatient Therapy Services.  COLTON Cano  5/18/2017

## 2017-05-18 NOTE — PROGRESS NOTES
[facility]  Adult Nutrition  Progress Note    SUMMARY       Pt reports good appetite, intake 100%.  Denies n/v. Family present at bedside.     Recommendations    1. Continue current diet order.  2. Provided DM education/A1c goal, pt not ready for change.    RD to monitor and follow up as appropriate.     Goals: 1) Patient to meet >85% of EEN/EPN  Nutrition Goal Status: progressing towards goal  Communication of RD Recs: other (comment) (reviewed w/ pateint & Spouse)    Continuum of Care Plan    Nursing Team: obtain weekly wts.     Reason for Assessment    Reason for Assessment: RD follow-up  Diagnosis: other (see comments) (Diplopia)  Relevent Medical History: HLD, HTN,DM2,Stroke, obesity   Interdisciplinary Rounds: did not attend  Nutrition Discharge Planning: Home on PO diet    Nutrition Prescription Ordered    Current Diet Order: DM 2000 kcal  Oral Nutrition Supplement: none needed at this time      Evaluation of Received Nutrients/Fluid Intake     IV Fluid (mL): 3600       Nutrition Risk Screen     Nutrition Risk Screen: no indicators present    Nutrition/Diet History    Patient Reported Diet/Restrictions/Preferences: diabetic diet  Typical Food/Fluid Intake: Good  Food Preferences: No cultural/Orthodox limitations reported  Factors Affecting Nutritional Intake:  (N/A)     Labs/Tests/Procedures/Meds    Pertinent Labs Reviewed: reviewed, pertinent  Pertinent Labs Comments: BUN 21, , A1c 11.4  Pertinent Medications Reviewed: reviewed, pertinent  Pertinent Medications Comments: aspirin, statin, insulin, thiamine, vitamin D, IVF @ 150    Physical Findings    Overall Physical Appearance: overweight  Tubes:  (none)  Oral/Mouth Cavity: tooth/teeth missing  Skin: intact, other (see comments) (Emre Score 21)    Anthropometrics    Height Method: Stated  Height (inches): 70.08 in  Weight Method: Bed Scale  Weight (kg): 93.6 kg  Ideal Body Weight (IBW), Male: 166.48 lb  % Ideal Body Weight, Male  (lb): 123.95 lb  BMI (kg/m2): 29.54  BMI Grade: 25 - 29.9 - overweight     Estimated/Assessed Needs    Weight Used For Calorie Calculations: 93.6 kg (206 lb 5.6 oz) (20-25kcals/kg/BW)   Height (cm): 178 cm  Energy Need Method: Kcal/kg, other (see comments) (20-25kcals/kg/BW)  RMR (Pine-St. Jeor Equation): 1762.28  Weight Used For Protein Calculations: 93.6 kg (206 lb 5.6 oz)  Protein Requirements: 75-90 (0.8-1.0gmsProtein/kg/BW)  Fluid Need Method: other (see comments) (1ml/1kcal or MD Rx)     Nutrition Diagnosis    No Nutrition Diagnosis at this time.     Monitor and Evaluation    Food and Nutrient Intake: food and beverage intake  Food and Nutrient Adminstration: diet order  Physical Activity and Function: nutrition-related ADLs and IADLs  Anthropometric Measurements: weight, weight change  Biochemical Data, Medical Tests and Procedures: gastrointestinal profile, electrolyte and renal panel, glucose/endocrine profile, inflammatory profile  Nutrition-Focused Physical Findings: overall appearance    Nutrition Risk    Level of Risk: other (see comments) (follow up once weekly)    Nutrition Follow-Up    RD Follow-up?: Yes     Loren Patel, RD, LDN

## 2017-05-18 NOTE — PLAN OF CARE
CM spoke with wife, Suzie, to clarify discharge plans. Instructed wife that it is very important to keep [facility] appt next Thursday; they will be checking glucose levels s/p finishing steroids and will be able to answer questions; may assist with follow up recs. Pt/fly will  steroids on the way home today; cost of $30 + tax. Suggested getting a walker at [facility] as recommended by therapy, wife in agreement. Patient ready for discharge.

## 2017-05-18 NOTE — PROGRESS NOTES
"[facility]  Neurology  Progress Note    Patient Name: Bessy Nunez  MRN: [id number]  Admission Date: 5/13/2017  Hospital Length of Stay: 4 days  Code Status: Full Code   Attending Provider: Elvira Hendricks MD  Primary Care Physician: Edgar Nova MD   Principal Problem:Internuclear ophthalmoplegia of left eye      Subjective:     Interval History:   Vision / L JAYDEN / confusion improving.  Plan for DC today.    From Consult 5/13/17:  58 yo M that I'm asked to see for possible MS.  Wife at bedside.  Came to ER today because of acute onset of diplopia Wednesday morning as well as ataxia "like i'm drunk."  Mri suggestive of demyelinating lesions (old and new).  Says he was told he had a stroke 16 years ago, but really "no one knew what was going on."  No symptoms since.  Since being in hospital, he has gotten confused and wandered out of obs unit looking for wife.  He is oriented currently.      Current Facility-Administered Medications   Medication Dose Route Frequency Provider Last Rate Last Dose    0.9%  NaCl infusion   Intravenous Continuous Emily Rainey  mL/hr at 05/17/17 1248      acetaminophen tablet 650 mg  650 mg Oral Q6H PRN Fabio Becerra MD        aspirin EC tablet 81 mg  81 mg Oral Daily Georgette Leon, NP   81 mg at 05/18/17 0803    atorvastatin tablet 80 mg  80 mg Oral Daily Georgette LEVI Leon, NP   80 mg at 05/18/17 0803    dextrose 50% injection 12.5 g  12.5 g Intravenous PRN Georgette Leon, NP        dextrose 50% injection 25 g  25 g Intravenous PRN Georgette Leon, NP        diltiaZEM 24 hr capsule 240 mg  240 mg Oral Daily Georgette MRubén Leon, NP   240 mg at 05/18/17 0804    enoxaparin injection 40 mg  40 mg Subcutaneous Daily Guanaco Story MD        glucagon (human recombinant) injection 1 mg  1 mg Intramuscular PRN Georgette Leon, NP        glucose chewable tablet 16 g  16 g Oral PRN Georgette Leon, NP        glucose chewable tablet 24 " g  24 g Oral PRN Georgette Leon NP        insulin aspart pen 1-10 Units  1-10 Units Subcutaneous QID (AC + HS) PRN Georgette Leon NP   8 Units at 05/18/17 0759    insulin aspart pen 22 Units  22 Units Subcutaneous TIDWM Guanaco Story MD   22 Units at 05/18/17 0759    insulin detemir pen 32 Units  32 Units Subcutaneous BID Guanaco Story MD   32 Units at 05/18/17 1108    lidocaine (PF) 10 mg/ml (1%) injection 10 mg  1 mL Other Once [place] Mary Beth Quan MD        quetiapine tablet 25 mg  25 mg Oral Nightly PRN BRIANA Hall MD        ramelteon tablet 8 mg  8 mg Oral Nightly PRN Anselmo Dumont MD   8 mg at 05/15/17 2058    sertraline tablet 150 mg  150 mg Oral Daily Georgette Leon NP   150 mg at 05/18/17 0803    sodium chloride 0.9% bolus 500 mL  500 mL Intravenous Continuous PRN Georgette Leon NP        sodium chloride 0.9% flush 3 mL  3 mL Intravenous Q8H Georgette Leon NP   3 mL at 05/18/17 0801    thiamine tablet 100 mg  100 mg Oral Daily Guanaco Story MD   100 mg at 05/18/17 0804    valsartan tablet 320 mg  320 mg Oral Daily Guanaco Story MD   320 mg at 05/18/17 1109    vitamin D 1000 units tablet 5,000 Units  5,000 Units Oral Daily Guanaco Story MD   5,000 Units at 05/18/17 0803       Review of Systems   Eyes: Positive for visual disturbance.   Respiratory: Negative for shortness of breath.    Cardiovascular: Negative for chest pain.   Gastrointestinal: Negative for constipation, diarrhea, nausea and vomiting.   Neurological: Negative for weakness.   Psychiatric/Behavioral: Positive for confusion. Negative for agitation. The patient is not nervous/anxious.      Objective:     Vital Signs (Most Recent):  Temp: 97.6 °F (36.4 °C) (05/18/17 0746)  Pulse: 104 (05/18/17 1116)  Resp: 17 (05/18/17 1116)  BP: (!) 181/92 (05/18/17 1116)  SpO2: 96 % (05/18/17 1116) Vital Signs (24h Range):  Temp:  [97.4 °F (36.3 °C)-98.2 °F (36.8 °C)] 97.6 °F (36.4  °C)  Pulse:  [] 104  Resp:  [12-18] 17  SpO2:  [93 %-97 %] 96 %  BP: (170-187)/() 181/92     Weight: 93.6 kg (206 lb 5.6 oz)  Body mass index is 29.61 kg/(m^2).    Physical Exam        General appearance: Well nourished, well developed, no acute distress.   Cardiovascular: pedal pulses 2, no edema or cyanosis, heart regular rate and rhythym, no carotid bruits.   -------------------------------------------------------------  Facial Expression: normal   Affect: full   Orientation to time & place: Oriented to time, place, person and situation   Attention & concentration: Normal attention span and concentration   Memory: Recent and remote memory intact  Language: Spontaneous.  Able to repeat and name objects.  However, when allowed to talk, he demonstrates subtle aphasia (improving).  Fund of knowledge: Aware of current events   Speech: normal (not dysarthric)  -------------------------------------------------------  Cranial nerves: normal visual acuity, visual fields full, optic discs not well visualized due small pupils, pupils equal round and reactive, extraocular movements intact when separately tested, but clear L JAYDEN (improving).  Facial sensation intact, face symmetrical, hearing intact to whisper, palate raises midline, shoulder shrug strength normal, tongue protrudes midline.   -------------------------------------------------------  Musculoskeletal  Muscle strength: 5/5 in BUE.  4+/5 in LLE, 4+/5 RLE   No pronator drift  Sensation: Intact to light touch in all extremities   --------------------------------------------------------------  Cerebellar and Coordination  Gait: normal, able to tandem without difficulty   Finger-nose: +dysmetria, mild, on left (improving)  --------------------------------------------------------------  MOVEMENT DISORDERS FOCUSED EXAM  Abnormality of movement (bradykinesia, hyperkinesia) present? No   Tremor present? Yes.  Fine tremor noted in BUE   Posture:  normal  Postural stability: no Romberg    Significant Labs:   CBC:   No results for input(s): WBC, HGB, HCT, PLT in the last 48 hours.  CMP:     Recent Labs  Lab 05/17/17  0527 05/18/17  0439   * 340*    140   K 3.5 3.1*    104   CO2 24 25   BUN 21* 25*   CREATININE 1.0 1.1   CALCIUM 8.2* 7.8*   MG 1.9  --    PROT  --  6.1   ALBUMIN  --  3.0*   BILITOT  --  0.8   ALKPHOS  --  67   AST  --  22   ALT  --  26   ANIONGAP 11 11   EGFRNONAA >60.0 >60.0     Inflammatory Markers: No results for input(s): SEDRATE, CRP, PROCAL in the last 48 hours.    Significant Imaging: I have reviewed all pertinent imaging results/findings within the past 24 hours.     MRI brain 5/13/17:  Multiple foci of FLAIR hyperintensity in the deep cerebral periventricular white matter in a configuration and distribution which can be seen in the setting of underlying demyelinating process such as multiple sclerosis. There are several punctate foci of restricted diffusion including the left aspect of the midbrain tectum which in light of the aforementioned white matter changes may reflect regions of recent demyelination. Superimposed small acute infarcts would be difficult to exclude, although are felt less likely given the overall constellation of findings. Additionally, there is a 0.7 cm enhancing T2/FLAIR identified in the anterior right thalamus concerning for focus of active demyelination.           Also old lesions in corpus collosum        Assessment and Plan:     * Internuclear ophthalmoplegia of left eye  Acute diplopia (2/2 left JAYDEN) in the setting of new, enhancing lesion in right thalamus, which is radiographically consistent with demyelinating disease    5/13 started steroids  5/14 LP    Improving s/p 5.5 g solumedrol      Demyelinating changes in brain  MRI with active lesion in thalamus, with concern for MS  - F/u REYMUNDO (wnl), NMO (wnl), ACE  - Continue vitamin D 5K u daily.  Vitamin D 11 this admission.  - s/p 5.5g  solumedrol  - CSF results from 5/14/17 show zero oligoclonal bands, and normal IgG  - MRI C/T/L WWO negative for additional active demyelinating lesions; possible prior lesions seen in jose/L cerebellum.  Renal cyst also noted.  - Outpatient f/u in the MS clinic next week.  TONY virus pending.    Ataxia  Ataxia of gait since 5/10.  Improving s/p solumedrol.   -> PT/OT    -> MS work-up as above      VTE Risk Mitigation         Ordered     enoxaparin injection 40 mg  Daily     Route:  Subcutaneous        05/17/17 1649     High Risk of VTE  Once      05/13/17 0259     Reason for No Pharmacological VTE Prophylaxis  Once      05/13/17 0259     Place sequential compression device  Until discontinued      05/13/17 0259          Sharon Luong MD  Neurology  [facility]

## 2017-05-18 NOTE — SUBJECTIVE & OBJECTIVE
"  Subjective:     Interval History:   Vision / L JAYDEN / confusion improving.  Plan for DC today.    From Consult 5/13/17:  58 yo M that I'm asked to see for possible MS.  Wife at bedside.  Came to ER today because of acute onset of diplopia Wednesday morning as well as ataxia "like i'm drunk."  Mri suggestive of demyelinating lesions (old and new).  Says he was told he had a stroke 16 years ago, but really "no one knew what was going on."  No symptoms since.  Since being in hospital, he has gotten confused and wandered out of obs unit looking for wife.  He is oriented currently.      Current Facility-Administered Medications   Medication Dose Route Frequency Provider Last Rate Last Dose    0.9%  NaCl infusion   Intravenous Continuous Emily Rainey  mL/hr at 05/17/17 1248      acetaminophen tablet 650 mg  650 mg Oral Q6H PRN Fabio Becerra MD        aspirin EC tablet 81 mg  81 mg Oral Daily Georgette Leon, NP   81 mg at 05/18/17 0803    atorvastatin tablet 80 mg  80 mg Oral Daily Georgette Leon, NP   80 mg at 05/18/17 0803    dextrose 50% injection 12.5 g  12.5 g Intravenous PRN Georgette Leon, NP        dextrose 50% injection 25 g  25 g Intravenous PRN Georgette Leon, NP        diltiaZEM 24 hr capsule 240 mg  240 mg Oral Daily Georgette Leon, NP   240 mg at 05/18/17 0804    enoxaparin injection 40 mg  40 mg Subcutaneous Daily Guanaco Story MD        glucagon (human recombinant) injection 1 mg  1 mg Intramuscular PRN Georgette Leon, NP        glucose chewable tablet 16 g  16 g Oral PRN Georgette Leon, NP        glucose chewable tablet 24 g  24 g Oral PRN Georgette Leon, NP        insulin aspart pen 1-10 Units  1-10 Units Subcutaneous QID (AC + HS) PRN Georgette Leon NP   8 Units at 05/18/17 0759    insulin aspart pen 22 Units  22 Units Subcutaneous TIDWM Guanaco Story MD   22 Units at 05/18/17 0759    insulin detemir pen 32 Units  32 Units " Subcutaneous BID Guanaco Story MD   32 Units at 05/18/17 1108    lidocaine (PF) 10 mg/ml (1%) injection 10 mg  1 mL Other Once Preston Quan MD        quetiapine tablet 25 mg  25 mg Oral Nightly PRN BRIANA Hall MD        ramelteon tablet 8 mg  8 mg Oral Nightly PRN Anselmo Dumont MD   8 mg at 05/15/17 2058    sertraline tablet 150 mg  150 mg Oral Daily Georgette Leon NP   150 mg at 05/18/17 0803    sodium chloride 0.9% bolus 500 mL  500 mL Intravenous Continuous PRN Georgette Leon NP        sodium chloride 0.9% flush 3 mL  3 mL Intravenous Q8H Georgette Leon NP   3 mL at 05/18/17 0801    thiamine tablet 100 mg  100 mg Oral Daily Guanaco Story MD   100 mg at 05/18/17 0804    valsartan tablet 320 mg  320 mg Oral Daily Guanaco Story MD   320 mg at 05/18/17 1109    vitamin D 1000 units tablet 5,000 Units  5,000 Units Oral Daily Guanaco Sotry MD   5,000 Units at 05/18/17 0803       Review of Systems   Eyes: Positive for visual disturbance.   Respiratory: Negative for shortness of breath.    Cardiovascular: Negative for chest pain.   Gastrointestinal: Negative for constipation, diarrhea, nausea and vomiting.   Neurological: Negative for weakness.   Psychiatric/Behavioral: Positive for confusion. Negative for agitation. The patient is not nervous/anxious.      Objective:     Vital Signs (Most Recent):  Temp: 97.6 °F (36.4 °C) (05/18/17 0746)  Pulse: 104 (05/18/17 1116)  Resp: 17 (05/18/17 1116)  BP: (!) 181/92 (05/18/17 1116)  SpO2: 96 % (05/18/17 1116) Vital Signs (24h Range):  Temp:  [97.4 °F (36.3 °C)-98.2 °F (36.8 °C)] 97.6 °F (36.4 °C)  Pulse:  [] 104  Resp:  [12-18] 17  SpO2:  [93 %-97 %] 96 %  BP: (170-187)/() 181/92     Weight: 93.6 kg (206 lb 5.6 oz)  Body mass index is 29.61 kg/(m^2).    Physical Exam        General appearance: Well nourished, well developed, no acute distress.   Cardiovascular: pedal pulses 2, no edema or cyanosis,  heart regular rate and rhythym, no carotid bruits.   -------------------------------------------------------------  Facial Expression: normal   Affect: full   Orientation to time & place: Oriented to time, place, person and situation   Attention & concentration: Normal attention span and concentration   Memory: Recent and remote memory intact  Language: Spontaneous.  Able to repeat and name objects.  However, when allowed to talk, he demonstrates subtle aphasia (improving).  Fund of knowledge: Aware of current events   Speech: normal (not dysarthric)  -------------------------------------------------------  Cranial nerves: normal visual acuity, visual fields full, optic discs not well visualized due small pupils, pupils equal round and reactive, extraocular movements intact when separately tested, but clear L JAYDEN (improving).  Facial sensation intact, face symmetrical, hearing intact to whisper, palate raises midline, shoulder shrug strength normal, tongue protrudes midline.   -------------------------------------------------------  Musculoskeletal  Muscle strength: 5/5 in BUE.  4+/5 in LLE, 4+/5 RLE   No pronator drift  Sensation: Intact to light touch in all extremities   --------------------------------------------------------------  Cerebellar and Coordination  Gait: normal, able to tandem without difficulty   Finger-nose: +dysmetria, mild, on left (improving)  --------------------------------------------------------------  MOVEMENT DISORDERS FOCUSED EXAM  Abnormality of movement (bradykinesia, hyperkinesia) present? No   Tremor present? Yes.  Fine tremor noted in BUE   Posture: normal  Postural stability: no Romberg    Significant Labs:   CBC:   No results for input(s): WBC, HGB, HCT, PLT in the last 48 hours.  CMP:     Recent Labs  Lab 05/17/17  0527 05/18/17  0439   * 340*    140   K 3.5 3.1*    104   CO2 24 25   BUN 21* 25*   CREATININE 1.0 1.1   CALCIUM 8.2* 7.8*   MG 1.9  --    PROT  --   6.1   ALBUMIN  --  3.0*   BILITOT  --  0.8   ALKPHOS  --  67   AST  --  22   ALT  --  26   ANIONGAP 11 11   EGFRNONAA >60.0 >60.0     Inflammatory Markers: No results for input(s): SEDRATE, CRP, PROCAL in the last 48 hours.    Significant Imaging: I have reviewed all pertinent imaging results/findings within the past 24 hours.     MRI brain 5/13/17:  Multiple foci of FLAIR hyperintensity in the deep cerebral periventricular white matter in a configuration and distribution which can be seen in the setting of underlying demyelinating process such as multiple sclerosis. There are several punctate foci of restricted diffusion including the left aspect of the midbrain tectum which in light of the aforementioned white matter changes may reflect regions of recent demyelination. Superimposed small acute infarcts would be difficult to exclude, although are felt less likely given the overall constellation of findings. Additionally, there is a 0.7 cm enhancing T2/FLAIR identified in the anterior right thalamus concerning for focus of active demyelination.           Also old lesions in corpus collosum

## 2017-05-18 NOTE — PHYSICIAN QUERY
PT Name: Bessy Nunez  MR #: [id number]  Physician Query Form - Renal Clarification     CDS/: Luisa Beasley               Contact information: [id number] [phone]    This form is a permanent document in the medical record.     QueryDate: May 18, 2017    By submitting this query, we are merely seeking further clarification of documentation. Please utilize your independent clinical judgment when addressing the question(s) below.    The Medical record contains the following:   Indicator Supporting Clinical Findings Location in Medical Record    Kidney (Renal) Insufficiency      Kidney (Renal) Failure / Injury      Nephrotoxic Agents     x BUN/Creatinine GFR BUN 19..>22..>25    Creatine 1.2..>1.5..>1.1    GFR >60.0..50.2..>60.0   Labs 5/13..>5/18    Urine: Casts         Eosinophils      Dehydration      Nausea/Vomiting      Dialysis/CRRT      Treatment:     x Other:  MRI performed with and without the use of 10 cc of gadolinium based contrast   MRI Lumbar 5/17       Provider, please specify the diagnosis or diagnoses associated with above clinical findings.    [x] MANISH - resolved. Not related to gadolinium.  [ ] Other Acute Kidney Failure/Injury (please specify): ____________  [ ] Unspecified Acute Kidney Failure/Injury  [ ] Acute Renal Insufficiency  [ ] Chronic Renal Insufficiency (please specify stage*): _____________  [ ] Chronic Kidney Disease (CKD) (please specify stage* below):      *[employer] Definitions:   Stage Description      eGFR (mL/min)   [ ] I  Slight kidney damage with normal or increased filtration  90+   [ ] II  Mildly reduced kidney function     60-89   [ ] III  Moderately reduced kidney function    30-59   [ ] IV  Severly reduced kidney function     15-29   [ ] V  Kidney failure, requiring transplant or dialysis   < 15    [ ] Other (please specify): _______________________________  [ ] Clinically Undetermined    Please document in your progress notes daily for the duration of treatment,  until resolved, and include in your discharge summary.

## 2017-05-18 NOTE — PLAN OF CARE

## 2017-05-19 LAB
ACE CSF-CCNC: 1.5 U/L (ref 0–2.5)
CMV SPEC QL SHELL VIAL CULT: NO GROWTH
GRAM STN SPEC: NORMAL

## 2017-05-23 ENCOUNTER — HOSPITAL ENCOUNTER (INPATIENT)
Facility: HOSPITAL | Age: 57
LOS: 12 days | Discharge: HOME | DRG: 102 | End: 2017-06-04
Attending: INTERNAL MEDICINE | Admitting: INTERNAL MEDICINE
Payer: MEDICAID

## 2017-05-23 ENCOUNTER — HOSPITAL ENCOUNTER (EMERGENCY)
Facility: HOSPITAL | Age: 57
Discharge: ANOTHER HOSPITAL | End: 2017-05-23
Attending: SURGERY
Payer: MEDICAID

## 2017-05-23 VITALS
SYSTOLIC BLOOD PRESSURE: 141 MMHG | BODY MASS INDEX: 36.4 KG/M2 | DIASTOLIC BLOOD PRESSURE: 80 MMHG | HEART RATE: 88 BPM | RESPIRATION RATE: 20 BRPM | OXYGEN SATURATION: 98 % | TEMPERATURE: 98 F | WEIGHT: 260 LBS | HEIGHT: 71 IN

## 2017-05-23 DIAGNOSIS — I25.10 CAD (CORONARY ARTERY DISEASE): ICD-10-CM

## 2017-05-23 DIAGNOSIS — G37.9 DEMYELINATING DISEASE: ICD-10-CM

## 2017-05-23 DIAGNOSIS — R55 SYNCOPE, UNSPECIFIED SYNCOPE TYPE: Primary | ICD-10-CM

## 2017-05-23 DIAGNOSIS — R55 SYNCOPE: ICD-10-CM

## 2017-05-23 DIAGNOSIS — E11.65 TYPE 2 DIABETES MELLITUS WITH HYPERGLYCEMIA, WITH LONG-TERM CURRENT USE OF INSULIN: ICD-10-CM

## 2017-05-23 DIAGNOSIS — G93.40 ENCEPHALOPATHY: ICD-10-CM

## 2017-05-23 DIAGNOSIS — H53.2 DIPLOPIA: ICD-10-CM

## 2017-05-23 DIAGNOSIS — D62 ACUTE BLOOD LOSS ANEMIA: Primary | ICD-10-CM

## 2017-05-23 DIAGNOSIS — Z79.4 TYPE 2 DIABETES MELLITUS WITH HYPERGLYCEMIA, WITH LONG-TERM CURRENT USE OF INSULIN: ICD-10-CM

## 2017-05-23 DIAGNOSIS — G93.40 ACUTE ENCEPHALOPATHY: ICD-10-CM

## 2017-05-23 DIAGNOSIS — G37.9 DEMYELINATING CHANGES IN BRAIN: ICD-10-CM

## 2017-05-23 DIAGNOSIS — I77.6 CNS VASCULITIS: ICD-10-CM

## 2017-05-23 DIAGNOSIS — G35 MULTIPLE SCLEROSIS: ICD-10-CM

## 2017-05-23 LAB
ALBUMIN SERPL BCP-MCNC: 2.8 G/DL
ALP SERPL-CCNC: 62 U/L
ALT SERPL W/O P-5'-P-CCNC: 33 U/L
ANION GAP SERPL CALC-SCNC: 13 MMOL/L
APTT BLDCRRT: <21 SEC
AST SERPL-CCNC: 20 U/L
BASOPHILS NFR BLD: 0 %
BILIRUB SERPL-MCNC: 0.8 MG/DL
BILIRUB UR QL STRIP: NEGATIVE
BNP SERPL-MCNC: 13 PG/ML
BUN SERPL-MCNC: 40 MG/DL
CALCIUM SERPL-MCNC: 8.8 MG/DL
CHLORIDE SERPL-SCNC: 103 MMOL/L
CK MB SERPL-MCNC: 0.5 NG/ML
CK MB SERPL-RTO: 2.5 %
CK SERPL-CCNC: 20 U/L
CK SERPL-CCNC: 20 U/L
CLARITY UR: CLEAR
CO2 SERPL-SCNC: 21 MMOL/L
COLOR UR: YELLOW
CREAT SERPL-MCNC: 1.1 MG/DL
DACRYOCYTES BLD QL SMEAR: ABNORMAL
DIFFERENTIAL METHOD: ABNORMAL
EOSINOPHIL NFR BLD: 1 %
ERYTHROCYTE [DISTWIDTH] IN BLOOD BY AUTOMATED COUNT: 13.2 %
EST. GFR  (AFRICAN AMERICAN): >60 ML/MIN/1.73 M^2
EST. GFR  (NON AFRICAN AMERICAN): >60 ML/MIN/1.73 M^2
GLUCOSE SERPL-MCNC: 334 MG/DL
GLUCOSE UR QL STRIP: NEGATIVE
HCT VFR BLD AUTO: 33.5 %
HGB BLD-MCNC: 11.8 G/DL
HGB UR QL STRIP: NEGATIVE
INR PPP: 1
KETONES UR QL STRIP: NEGATIVE
LACTATE SERPL-SCNC: 3.3 MMOL/L
LEUKOCYTE ESTERASE UR QL STRIP: NEGATIVE
LYMPHOCYTES NFR BLD: 12 %
MCH RBC QN AUTO: 29.3 PG
MCHC RBC AUTO-ENTMCNC: 35.2 %
MCV RBC AUTO: 83 FL
METAMYELOCYTES NFR BLD MANUAL: 1 %
MONOCYTES NFR BLD: 3 %
MYELOCYTES NFR BLD MANUAL: 3 %
NEUTROPHILS NFR BLD: 77 %
NEUTS BAND NFR BLD MANUAL: 3 %
NITRITE UR QL STRIP: NEGATIVE
OVALOCYTES BLD QL SMEAR: ABNORMAL
PH UR STRIP: 5 [PH] (ref 5–8)
PLATELET # BLD AUTO: 236 K/UL
PLATELET BLD QL SMEAR: ABNORMAL
PMV BLD AUTO: 9.8 FL
POIKILOCYTOSIS BLD QL SMEAR: SLIGHT
POTASSIUM SERPL-SCNC: 4.6 MMOL/L
PROT SERPL-MCNC: 5.6 G/DL
PROT UR QL STRIP: NEGATIVE
PROTHROMBIN TIME: 10.4 SEC
RBC # BLD AUTO: 4.03 M/UL
SODIUM SERPL-SCNC: 137 MMOL/L
SP GR UR STRIP: 1.01 (ref 1–1.03)
TROPONIN I SERPL DL<=0.01 NG/ML-MCNC: 0.02 NG/ML
URN SPEC COLLECT METH UR: NORMAL
UROBILINOGEN UR STRIP-ACNC: NEGATIVE EU/DL
WBC # BLD AUTO: 19.22 K/UL

## 2017-05-23 PROCEDURE — 93010 ELECTROCARDIOGRAM REPORT: CPT | Mod: ,,, | Performed by: INTERNAL MEDICINE

## 2017-05-23 PROCEDURE — 20600001 HC STEP DOWN PRIVATE ROOM

## 2017-05-23 PROCEDURE — 85610 PROTHROMBIN TIME: CPT

## 2017-05-23 PROCEDURE — 82553 CREATINE MB FRACTION: CPT

## 2017-05-23 PROCEDURE — 80053 COMPREHEN METABOLIC PANEL: CPT

## 2017-05-23 PROCEDURE — 81003 URINALYSIS AUTO W/O SCOPE: CPT

## 2017-05-23 PROCEDURE — 83605 ASSAY OF LACTIC ACID: CPT

## 2017-05-23 PROCEDURE — 99285 EMERGENCY DEPT VISIT HI MDM: CPT | Mod: 25

## 2017-05-23 PROCEDURE — 36415 COLL VENOUS BLD VENIPUNCTURE: CPT

## 2017-05-23 PROCEDURE — 96361 HYDRATE IV INFUSION ADD-ON: CPT

## 2017-05-23 PROCEDURE — 87040 BLOOD CULTURE FOR BACTERIA: CPT

## 2017-05-23 PROCEDURE — 83880 ASSAY OF NATRIURETIC PEPTIDE: CPT

## 2017-05-23 PROCEDURE — 85730 THROMBOPLASTIN TIME PARTIAL: CPT

## 2017-05-23 PROCEDURE — 84484 ASSAY OF TROPONIN QUANT: CPT

## 2017-05-23 PROCEDURE — 96360 HYDRATION IV INFUSION INIT: CPT

## 2017-05-23 PROCEDURE — 25000003 PHARM REV CODE 250: Performed by: SURGERY

## 2017-05-23 PROCEDURE — 85027 COMPLETE CBC AUTOMATED: CPT

## 2017-05-23 PROCEDURE — 85007 BL SMEAR W/DIFF WBC COUNT: CPT

## 2017-05-23 PROCEDURE — 93005 ELECTROCARDIOGRAM TRACING: CPT

## 2017-05-23 RX ORDER — SODIUM CHLORIDE 9 MG/ML
1000 INJECTION, SOLUTION INTRAVENOUS CONTINUOUS
Status: ACTIVE | OUTPATIENT
Start: 2017-05-23 | End: 2017-05-23

## 2017-05-23 RX ORDER — SODIUM CHLORIDE 9 MG/ML
1000 INJECTION, SOLUTION INTRAVENOUS CONTINUOUS
Status: DISCONTINUED | OUTPATIENT
Start: 2017-05-23 | End: 2017-05-23 | Stop reason: HOSPADM

## 2017-05-23 RX ORDER — SODIUM CHLORIDE 9 MG/ML
1000 INJECTION, SOLUTION INTRAVENOUS
Status: COMPLETED | OUTPATIENT
Start: 2017-05-23 | End: 2017-05-23

## 2017-05-23 RX ADMIN — SODIUM CHLORIDE 1000 ML: 0.9 INJECTION, SOLUTION INTRAVENOUS at 11:05

## 2017-05-23 RX ADMIN — SODIUM CHLORIDE 1000 ML: 0.9 INJECTION, SOLUTION INTRAVENOUS at 05:05

## 2017-05-23 RX ADMIN — SODIUM CHLORIDE 1000 ML: 0.9 INJECTION, SOLUTION INTRAVENOUS at 02:05

## 2017-05-23 NOTE — ED NOTES
MONITORING PT. NO DISTRESS NOTED. STATES HE FEELS MUCH BETTER SINCE THE SYNCOPAL EPISODE THIS MORNING.

## 2017-05-23 NOTE — ED PROVIDER NOTES
[facility]                                        May 23, 2017                   Chief Complaint  57 y.o. male with Fatigue    History of Present Illness  Bessy Nunez presents to the emergency room after syncopal episode today  Recently DX with multiple sclerosis at [facility], discharged 5 days ago  Stood up this morning and passed out, questionable loss of consciousness  Patient states his previous double vision is now much worse after the syncope  Patient has no obvious new motor deficits, answers all questions appropriately  Wife states the patient did not know where he was after the event happened  Patient was given high-dose steroids in the hospital for his MS treatment then  Has had trouble with his blood pressure and diabetes since the MS diagnosis    The history is provided by the patient    Past Medical History   -- Essential hypertension    -- Internuclear ophthalmoplegia of left eye    -- Mixed hyperlipidemia    -- CHASE (nonalcoholic steatohepatitis)    -- Obesity    -- Stroke    -- Type 2 diabetes mellitus with diabetic polyneuropathy      Surgical history: Skin cancer incision  NO KNOWN DRUG ALLERGIES  Social history:  white male, nonsmoker  Family history: Heart disease, HTN, CHF, cancer, DM    Review of Systems and Physical Exam     Review of Systems  -- Constitution - fatigue, no weakness, no chills and no fever  -- Eyes - double vision  -- Ear, Nose - no tinnitus or earache, no nasal congestion or discharge  -- Mouth,Throat - no sore throat, no toothache, normal voice, normal swallowing  -- Respiratory - denies cough and congestion, no shortness of breath, no VALENTIN  -- Cardiovascular - denies chest pain, no palpitations, denies claudication  -- Gastrointestinal - denies abdominal pain, nausea, vomiting, or diarrhea  -- Musculoskeletal - denies back pain, negative for myalgias and arthralgias   -- Neurological - no headache, denies weakness or seizure; no LOC  -- Skin -  denies pallor, rash, or changes in skin. no hives or welts noted    Vital Signs  -- His oral temperature is 98 °F (36.7 °C).   -- His blood pressure is 111/78 and his pulse is 97.   -- His respiration is 20 and oxygen saturation is 98%.      Physical Exam  -- Nursing note and vitals reviewed  -- Constitutional: Appears well-developed and well-nourished  -- Head: Atraumatic. Normocephalic. No obvious abnormality  -- Eyes: Pupils are equal and reactive to light. Normal conjunctiva and lids  -- Cardiac: Normal rate, regular rhythm and normal heart sounds  -- Pulmonary: Normal respiratory effort, breath sounds clear to auscultation  -- Abdominal: Soft, no tenderness. Normal bowel sounds. Normal liver edge  -- Musculoskeletal: Normal range of motion, no effusions. Joints stable   -- Neurological: No focal deficits. Showed good interaction with staff  -- Vascular: Posterior tibial, dorsalis pedis and radial pulses 2+ bilaterally      Emergency Room Course     Treatment and Evaluation  -- The CT of the head performed in the ER today was negative for acute pathology  -- The EKG findings today were without concerning findings from baseline   -- Chest x-ray showed no infiltrate and showed no acute pathology   -- Blood cultures have also been drawn, results are pending  -- The BNP drawn in the ER today were within normal limits  -- The PT, PTT, and INR were within normal limits  -- Saline lock started in the ER per protocol  -- IV 1000 ml NS given in today in the ER  -- Cardiac monitoring per protocol    Abnormal lab values  -- CO2 21 (*)   -- Glucose 334 (*)   -- BUN, Bld 40 (*)   -- Total Protein 5.6 (*)   -- Albumin 2.8 (*)   -- WBC 19.22 (*)   -- RBC 4.03 (*)   -- Hemoglobin 11.8 (*)   -- Hematocrit 33.5 (*)   -- Gran% 77.0 (*)   -- Lymph% 12.0 (*)   -- Mono% 3.0 (*)   -- Lactate (Lactic Acid) 3.3 (*)     ED Physician Notes  -- 12:20 PM: Do not have full neurology coverage, I did discuss with Leighton   -- The patient  needs neurology consult for his MS, transfer to high level care     Diagnosis  -- Syncope  -- Diplopia     Disposition and Plan  -- Disposition: transfer  -- Condition: stable  -- The patient needs a higher level of care  -- The patient is appropriate for transfer    This note is dictated on Dragon Natural Speaking word recognition program.  There are word recognition mistakes that are occasionally missed on review.           Jayesh Cho MD  05/23/17 6418

## 2017-05-23 NOTE — PLAN OF CARE
Outside Transfer Acceptance Note    Transferring Physician or Mid Level Provider/Speciality: Dr. Jayesh Cho, Emergency Medicine    Accepting Physician: Emily Rainey     Date of Acceptance: 05/23/2017     Transferring Facility/Hospital: [facility] in [place], [place]    Reason for [place] [facility]: Needs Neurology evaluation for worsening double vision    Report from Transferring Physician or Mid-Level provider/Hospital course: 56 y/o male with poorly controlled Type 2 diabetes on insulin, HTN, mixed HLP and recently diagnosed with multiple [place] [facility] [place] ago and discharged last week [place] [facility] [place] Hospital Medicine Team 3 after receiving 5 days of high dose IV Solumedrol. Patient was discharged on Medrol dospepak. Patient according to Dr. Cho states double vision was improving and getting better until this am when had a severe episode of double vision and dizziness and blacked out at home. Patient came around but was complaining of severe double vision and wife noted patient also confused after coming around so went to [facility]. Patient noted to have WBC 19,000 but is on steroids. Blood culture done but no U/A. Blood culture sent and CXR done that was unremarkable. CT imaging of head unremarkable for any new findings. Patient noted to have  and BUN/Cr of 40/1.1 so Dr. Cho felt patient may just be dehydrated so patient given 1 liter of NS in ER. Patient feeling better. He spoke with Neurology here at [facility] who recommended admit for evaluation due to worsening double vision but Dr. Cho thinks likely just related to dehydration as patient has been having high blood sugars at home and during recent hospitalization.     To do list upon patient arrival:   · Bounceback to [facility] Hospital Medicine Team 3  · Consult General Neurology due to concern about MS as cause of current symptoms   · Evaluation of leukocytosis    Please call extension [phone] upon  patient arrival to floor for Hospital Medicine admit team assignment and for additional admit orders. If patient is coming from another [facility] facility please also call [phone] to inform the admit team/office that patient has arrived from the [facility] facility to the floor so patient can be evaluated.      DRU LEVINE MD  Attending Staff Physician   [facility]  Pager: [phone]  Spectralink: [phone]

## 2017-05-23 NOTE — ED TRIAGE NOTES
Patient presents to the ER via ambulance after having a syncope episode this morning.  Patient denies LOC, but does report having weakness for the past week.  Patient was recently diagnosed with MS.  Patient denies pain.  EMS reports that patient positive orthostatics.

## 2017-05-24 ENCOUNTER — TELEPHONE (OUTPATIENT)
Dept: NEUROLOGY | Facility: CLINIC | Age: 57
End: 2017-05-24

## 2017-05-24 PROBLEM — G35 MULTIPLE SCLEROSIS: Status: ACTIVE | Noted: 2017-05-24

## 2017-05-24 PROBLEM — H53.2 DIPLOPIA: Status: ACTIVE | Noted: 2017-05-24

## 2017-05-24 PROBLEM — G93.40 ENCEPHALOPATHY: Status: ACTIVE | Noted: 2017-05-24

## 2017-05-24 PROBLEM — D72.829 LEUKOCYTOSIS: Status: ACTIVE | Noted: 2017-05-24
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
## 2017-05-24 LAB
ALBUMIN SERPL BCP-MCNC: 2.5 G/DL
ALP SERPL-CCNC: 63 U/L
ALT SERPL W/O P-5'-P-CCNC: 41 U/L
ANION GAP SERPL CALC-SCNC: 6 MMOL/L
ANISOCYTOSIS BLD QL SMEAR: SLIGHT
AST SERPL-CCNC: 36 U/L
BASOPHILS NFR BLD: 0 %
BILIRUB SERPL-MCNC: 0.6 MG/DL
BUN SERPL-MCNC: 35 MG/DL
CALCIUM SERPL-MCNC: 8.5 MG/DL
CHLORIDE SERPL-SCNC: 104 MMOL/L
CO2 SERPL-SCNC: 25 MMOL/L
CREAT SERPL-MCNC: 1 MG/DL
DIFFERENTIAL METHOD: ABNORMAL
EOSINOPHIL NFR BLD: 2 %
ERYTHROCYTE [DISTWIDTH] IN BLOOD BY AUTOMATED COUNT: 13.3 %
EST. GFR  (AFRICAN AMERICAN): >60 ML/MIN/1.73 M^2
EST. GFR  (NON AFRICAN AMERICAN): >60 ML/MIN/1.73 M^2
GLUCOSE SERPL-MCNC: 353 MG/DL
HCT VFR BLD AUTO: 29 %
HGB BLD-MCNC: 10.1 G/DL
HYPOCHROMIA BLD QL SMEAR: ABNORMAL
LYMPHOCYTES NFR BLD: 27 %
MCH RBC QN AUTO: 29.5 PG
MCHC RBC AUTO-ENTMCNC: 34.8 %
MCV RBC AUTO: 85 FL
MONOCYTES NFR BLD: 2 %
NEUTROPHILS NFR BLD: 69 %
OVALOCYTES BLD QL SMEAR: ABNORMAL
PLATELET # BLD AUTO: 175 K/UL
PLATELET BLD QL SMEAR: ABNORMAL
PMV BLD AUTO: 9.4 FL
POCT GLUCOSE: 281 MG/DL (ref 70–110)
POCT GLUCOSE: 310 MG/DL (ref 70–110)
POCT GLUCOSE: 321 MG/DL (ref 70–110)
POCT GLUCOSE: 362 MG/DL (ref 70–110)
POCT GLUCOSE: 369 MG/DL (ref 70–110)
POCT GLUCOSE: 392 MG/DL (ref 70–110)
POIKILOCYTOSIS BLD QL SMEAR: SLIGHT
POLYCHROMASIA BLD QL SMEAR: ABNORMAL
POTASSIUM SERPL-SCNC: 5 MMOL/L
PROT SERPL-MCNC: 4.8 G/DL
RBC # BLD AUTO: 3.42 M/UL
SODIUM SERPL-SCNC: 135 MMOL/L
TSH SERPL DL<=0.005 MIU/L-ACNC: 1.79 UIU/ML
WBC # BLD AUTO: 15.66 K/UL

## 2017-05-24 PROCEDURE — 85027 COMPLETE CBC AUTOMATED: CPT

## 2017-05-24 PROCEDURE — 97162 PT EVAL MOD COMPLEX 30 MIN: CPT

## 2017-05-24 PROCEDURE — 20000000 HC ICU ROOM

## 2017-05-24 PROCEDURE — 97116 GAIT TRAINING THERAPY: CPT

## 2017-05-24 PROCEDURE — 97110 THERAPEUTIC EXERCISES: CPT

## 2017-05-24 PROCEDURE — 36415 COLL VENOUS BLD VENIPUNCTURE: CPT

## 2017-05-24 PROCEDURE — 25000003 PHARM REV CODE 250: Performed by: STUDENT IN AN ORGANIZED HEALTH CARE EDUCATION/TRAINING PROGRAM

## 2017-05-24 PROCEDURE — 80053 COMPREHEN METABOLIC PANEL: CPT

## 2017-05-24 PROCEDURE — 63600175 PHARM REV CODE 636 W HCPCS: Performed by: STUDENT IN AN ORGANIZED HEALTH CARE EDUCATION/TRAINING PROGRAM

## 2017-05-24 PROCEDURE — 84443 ASSAY THYROID STIM HORMONE: CPT

## 2017-05-24 PROCEDURE — 99232 SBSQ HOSP IP/OBS MODERATE 35: CPT | Mod: ,,, | Performed by: PSYCHIATRY & NEUROLOGY

## 2017-05-24 PROCEDURE — 85007 BL SMEAR W/DIFF WBC COUNT: CPT

## 2017-05-24 PROCEDURE — 20600001 HC STEP DOWN PRIVATE ROOM

## 2017-05-24 PROCEDURE — 99232 SBSQ HOSP IP/OBS MODERATE 35: CPT | Mod: ,,, | Performed by: HOSPITALIST

## 2017-05-24 RX ORDER — ATORVASTATIN CALCIUM 20 MG/1
40 TABLET, FILM COATED ORAL DAILY
Status: DISCONTINUED | OUTPATIENT
Start: 2017-05-24 | End: 2017-06-04 | Stop reason: HOSPADM

## 2017-05-24 RX ORDER — SODIUM CHLORIDE 9 MG/ML
INJECTION, SOLUTION INTRAVENOUS CONTINUOUS
Status: ACTIVE | OUTPATIENT
Start: 2017-05-24 | End: 2017-05-24

## 2017-05-24 RX ORDER — IBUPROFEN 200 MG
24 TABLET ORAL
Status: DISCONTINUED | OUTPATIENT
Start: 2017-05-24 | End: 2017-06-04 | Stop reason: HOSPADM

## 2017-05-24 RX ORDER — IBUPROFEN 200 MG
16 TABLET ORAL
Status: DISCONTINUED | OUTPATIENT
Start: 2017-05-24 | End: 2017-06-04 | Stop reason: HOSPADM

## 2017-05-24 RX ORDER — ASPIRIN 81 MG/1
81 TABLET ORAL DAILY
Status: DISCONTINUED | OUTPATIENT
Start: 2017-05-24 | End: 2017-05-28

## 2017-05-24 RX ORDER — INSULIN ASPART 100 [IU]/ML
0-5 INJECTION, SOLUTION INTRAVENOUS; SUBCUTANEOUS
Status: DISCONTINUED | OUTPATIENT
Start: 2017-05-24 | End: 2017-05-31

## 2017-05-24 RX ORDER — ENOXAPARIN SODIUM 100 MG/ML
40 INJECTION SUBCUTANEOUS EVERY 24 HOURS
Status: DISCONTINUED | OUTPATIENT
Start: 2017-05-24 | End: 2017-05-26

## 2017-05-24 RX ORDER — VALSARTAN 160 MG/1
320 TABLET ORAL DAILY
Status: DISCONTINUED | OUTPATIENT
Start: 2017-05-24 | End: 2017-05-27

## 2017-05-24 RX ORDER — DILTIAZEM HYDROCHLORIDE 240 MG/1
240 CAPSULE, COATED, EXTENDED RELEASE ORAL DAILY
Status: DISCONTINUED | OUTPATIENT
Start: 2017-05-24 | End: 2017-05-28

## 2017-05-24 RX ORDER — ATENOLOL 25 MG/1
50 TABLET ORAL DAILY
Status: DISCONTINUED | OUTPATIENT
Start: 2017-05-24 | End: 2017-05-27

## 2017-05-24 RX ORDER — GLUCAGON 1 MG
1 KIT INJECTION
Status: DISCONTINUED | OUTPATIENT
Start: 2017-05-24 | End: 2017-06-04 | Stop reason: HOSPADM

## 2017-05-24 RX ORDER — INSULIN ASPART 100 [IU]/ML
11 INJECTION, SOLUTION INTRAVENOUS; SUBCUTANEOUS
Status: DISCONTINUED | OUTPATIENT
Start: 2017-05-24 | End: 2017-05-25

## 2017-05-24 RX ADMIN — SODIUM CHLORIDE: 0.9 INJECTION, SOLUTION INTRAVENOUS at 08:05

## 2017-05-24 RX ADMIN — INSULIN ASPART 3 UNITS: 100 INJECTION, SOLUTION INTRAVENOUS; SUBCUTANEOUS at 05:05

## 2017-05-24 RX ADMIN — VALSARTAN 320 MG: 160 TABLET, FILM COATED ORAL at 08:05

## 2017-05-24 RX ADMIN — INSULIN ASPART 4 UNITS: 100 INJECTION, SOLUTION INTRAVENOUS; SUBCUTANEOUS at 08:05

## 2017-05-24 RX ADMIN — INSULIN ASPART 5 UNITS: 100 INJECTION, SOLUTION INTRAVENOUS; SUBCUTANEOUS at 01:05

## 2017-05-24 RX ADMIN — DILTIAZEM HYDROCHLORIDE 240 MG: 240 CAPSULE, COATED, EXTENDED RELEASE ORAL at 08:05

## 2017-05-24 RX ADMIN — INSULIN ASPART 11 UNITS: 100 INJECTION, SOLUTION INTRAVENOUS; SUBCUTANEOUS at 05:05

## 2017-05-24 RX ADMIN — INSULIN DETEMIR 33 UNITS: 100 INJECTION, SOLUTION SUBCUTANEOUS at 09:05

## 2017-05-24 RX ADMIN — INSULIN ASPART 2 UNITS: 100 INJECTION, SOLUTION INTRAVENOUS; SUBCUTANEOUS at 09:05

## 2017-05-24 RX ADMIN — ASPIRIN 81 MG: 81 TABLET, COATED ORAL at 08:05

## 2017-05-24 RX ADMIN — SERTRALINE HYDROCHLORIDE 150 MG: 50 TABLET ORAL at 08:05

## 2017-05-24 RX ADMIN — INSULIN ASPART 11 UNITS: 100 INJECTION, SOLUTION INTRAVENOUS; SUBCUTANEOUS at 01:05

## 2017-05-24 RX ADMIN — ATENOLOL 50 MG: 25 TABLET ORAL at 08:05

## 2017-05-24 RX ADMIN — INSULIN ASPART 11 UNITS: 100 INJECTION, SOLUTION INTRAVENOUS; SUBCUTANEOUS at 08:05

## 2017-05-24 RX ADMIN — ENOXAPARIN SODIUM 40 MG: 100 INJECTION SUBCUTANEOUS at 05:05

## 2017-05-24 RX ADMIN — ATORVASTATIN CALCIUM 40 MG: 20 TABLET, FILM COATED ORAL at 08:05

## 2017-05-24 NOTE — PT/OT/SLP EVAL
"Physical Therapy  Evaluation    Bessy Nunez   MRN: [id number]   Admitting Diagnosis: Syncope    PT Received On: 17  PT Start Time: 1037     PT Stop Time: 1142    PT Total Time (min): 65 min       Billable Minutes:  Evaluation 16, Gait Training 14 and Therapeutic Exercise 20    Diagnosis: Syncope      Past Medical History:   Diagnosis Date    Essential hypertension 2012    Internuclear ophthalmoplegia of left eye 2017    Mixed hyperlipidemia 2012    NAFLD (nonalcoholic fatty liver disease) 10/11/2013    CHASE (nonalcoholic steatohepatitis)     Obesity     Stroke     Type 2 diabetes mellitus with diabetic polyneuropathy, with long-term current use of insulin 2017      Past Surgical History:   Procedure Laterality Date    SKIN CANCER EXCISION         Referring physician: Donna Jason MD   Date referred to PT: 17    General Precautions: Standard, seizure, fall  Orthopedic Precautions: N/A   Braces: N/A       Do you have any cultural, spiritual, Voodoo conflicts, given your current situation?: none    Patient History:  Lives With: spouse  Living Arrangements: house  Home Accessibility: stairs within home  Home Layout: Able to live on 1st floor  Transportation Available: car, family or friend will provide  Equipment Currently Used at Home: walker, rolling  DME owned (not currently used): rolling walker    Previous Level of Function:  Ambulation Skills: independent  Transfer Skills: independent  ADL Skills: independent  Work/Leisure Activity: independent    Subjective:  Communicated with nursing prior to session.  "I am having trouble getting up quickly"    Chief Complaint: blurry vision  Patient goals: To go home    Pain Ratin/10               Pain Rating Post-Intervention: 0/10    Objective:   Patient found with: telemetry, peripheral IV     Cognitive Exam:  Oriented to: Person, Place, Time and Situation - though confused during interview and provided different " answers to the same question, also provided incorrect answers to questions that the pts wife had to correct.    Follows Commands/attention: Follows multistep  commands  Communication: clear/fluent and though short worded  Safety awareness/insight to disability: impaired    Physical Exam:  Postural examination/scapula alignment: No postural abnormalities identified    Skin integrity: Dry  - Callus formation of the R 1st digit  Edema: None noted     Sensation:   Impaired  light/touch R 1st digit and sharp/dull R 1st digit    Upper Extremity Range of Motion:  Right Upper Extremity: WNL  Left Upper Extremity: WNL    Upper Extremity Strength:  Right Upper Extremity: WNL  Left Upper Extremity: WNL    Lower Extremity Range of Motion:  Right Lower Extremity: WFL  Left Lower Extremity: WFL    Lower Extremity Strength:  Right Lower Extremity: WFL except 4/5 hip flex  Left Lower Extremity: WFL except 4/5 hip flex     Fine motor coordination:  Intact  RLE heel shin and LLE heel shin    Gross motor coordination: WFL    Functional Mobility:  Bed Mobility:  Supine to Sit: Supervision    Transfers:  Sit <> Stand Assistance: Supervision  Sit <> Stand Assistive Device: No Assistive Device  Bed <> Chair Technique: Stand Pivot  Bed <> Chair Assistance: Contact Guard Assistance  Bed <> Chair Assistive Device: No Assistive Device    Gait:   Gait Distance: Pt amb 616', ataxic gait and ataxic trunk control noted, decreased step length with forward propulsion and fwd trunk lean, verbal cuing and demo to inc step length and to facilitate heel strike  Assistance 1: Minimum assistance  Gait Assistive Device: No device  Gait Pattern: swing-to gait  Gait Deviation(s): decreased justin, decreased step length, decreased stride length, forward lean (Inc stance time on L LE)    Balance:   Static Sit: FAIR+: Able to take MINIMAL challenges from all directions  Dynamic Sit: FAIR+: Maintains balance through MINIMAL excursions of active trunk  motion  Static Stand: FAIR: Maintains without assist but unable to take challenges  Dynamic stand: POOR: N/A    Therapeutic Activities and Exercises:  Czuka 10x sit<>stand: 40.08 sec with use of UEs  6MWT: Pt amb 516' with Brian, 4/10 RPE following  Sit<>stand: 20 reps  SLR: 20 reps each LE perf individually, in sit  Hip abd/add: 20 reps each LE perf individually, in sit  Sit-ups: 30 reps      AM-PAC 6 CLICK MOBILITY  How much help from another person does this patient currently need?   1 = Unable, Total/Dependent Assistance  2 = A lot, Maximum/Moderate Assistance  3 = A little, Minimum/Contact Guard/Supervision  4 = None, Modified [place]/Independent    Turning over in bed (including adjusting bedclothes, sheets and blankets)?: 4  Sitting down on and standing up from a chair with arms (e.g., wheelchair, bedside commode, etc.): 4  Moving from lying on back to sitting on the side of the bed?: 4  Moving to and from a bed to a chair (including a wheelchair)?: 3  Need to walk in hospital room?: 3  Climbing 3-5 steps with a railing?: 2  Total Score: 20     AM-PAC Raw Score CMS G-Code Modifier Level of Impairment Assistance   6 % Total / Unable   7 - 9 CM 80 - 100% Maximal Assist   10 - 14 CL 60 - 80% Moderate Assist   15 - 19 CK 40 - 60% Moderate Assist   20 - 22 CJ 20 - 40% Minimal Assist   23 CI 1-20% SBA / CGA   24 CH 0% Independent/ Mod I     Patient left up in chair with all lines intact, call button in reach and spouse present.    Assessment:   Bessy Nunez is a 57 y.o. male with a medical diagnosis of Syncope and presents with impairments in gait and trunk control.  4/5 hip flex strength noted, B.  Pt tolerated PT tx session well, was confused at times, required inc time for verbal cuing and ed, automatic and agreeable when following commands.  Pt will benefit from skilled PT services to address the above impairments.        Rehab identified problem list/impairments: Rehab identified problem  list/impairments: gait instability, impaired balance, impaired endurance, impaired cardiopulmonary response to activity, weakness, decreased lower extremity function, impaired sensation, visual deficits, impaired functional mobilty, impaired skin    Rehab potential is fair.    Activity tolerance: Good    Discharge recommendations: Discharge Facility/Level Of Care Needs: outpatient PT     Barriers to discharge: Barriers to Discharge: None    Equipment recommendations: Equipment Needed After Discharge: none     GOALS:    Physical Therapy Goals        Problem: Physical Therapy Goal    Goal Priority Disciplines Outcome Goal Variances Interventions   Physical Therapy Goal     PT/OT, PT Ongoing (interventions implemented as appropriate)     Description:  Goals to be met by: 17     Patient will increase functional independence with mobility by performin. Pt to perf Czuka 10x sit<>stand test: 24.28 sec, with S to perf, to demonstrate improvements in B LE mm strength.  2. Bed to chair transfer with Supervision.  3. Pt to perf 6MWT: amb 616', with CGA PRN, to demonstrate a clinically significant improvement in aerobic capacity.    4. Lower extremity exercise program x 20 reps per handout, with assistance as needed.                      PLAN:    Patient to be seen 3 x/week to address the above listed problems via gait training, therapeutic activities, therapeutic exercises, neuromuscular re-education  Plan of Care expires: 17  Plan of Care reviewed with: patient, spouse          Zoila Gee, PT  2017

## 2017-05-24 NOTE — ASSESSMENT & PLAN NOTE
"- Originally presenting symptom of MS.   - Resolved by the time of my evaluation. EOMI on exam.   - Diplopia following syncope may have been "unmasking" as opposed to acute neurologic problem.     "

## 2017-05-24 NOTE — PROGRESS NOTES
RN went into room and noticed pt snacking on sweet potato chips. RN reviewed diabetic diet with pt. Pt and wife verbalized understanding. Dietary consult ordered. Will continue to monitor.

## 2017-05-24 NOTE — ASSESSMENT & PLAN NOTE
As seen on MRIs from 5/13  S/p 5.5g solumedrol last admission  Discussed with MS team - will need outpatient f/u appointment

## 2017-05-24 NOTE — SUBJECTIVE & OBJECTIVE
"Past Medical History:   Diagnosis Date    Essential hypertension 2012    Internuclear ophthalmoplegia of left eye 2017    Mixed hyperlipidemia 2012    NAFLD (nonalcoholic fatty liver disease) 10/11/2013    CHASE (nonalcoholic steatohepatitis)     Obesity     Stroke     Type 2 diabetes mellitus with diabetic polyneuropathy, with long-term current use of insulin 2017       Past Surgical History:   Procedure Laterality Date    SKIN CANCER EXCISION         Review of patient's allergies indicates:  No Known Allergies    Current Neurological Medications: sertraline    Current Facility-Administered Medications on File Prior to Encounter   Medication    [] 0.9%  NaCl infusion    [DISCONTINUED] 0.9%  NaCl infusion     Current Outpatient Prescriptions on File Prior to Encounter   Medication Sig    aspirin (ECOTRIN) 81 MG EC tablet Take 81 mg by mouth once daily.    atenolol (TENORMIN) 50 MG tablet Take 1 tablet (50 mg total) by mouth once daily.    atorvastatin (LIPITOR) 40 MG tablet Take 1 tablet (40 mg total) by mouth once daily.    diltiaZEM (DILACOR XR) 240 MG CDCR Take 1 capsule (240 mg total) by mouth once daily.    insulin degludec (TRESIBA FLEXTOUCH U-200) 200 unit/mL (3 mL) InPn Inject 42 Units into the skin every evening.    insulin needles, disposable, (BD ULTRA-FINE ANA PEN NEEDLES) 32 x 5/32 " Ndle Inject twice daily    liraglutide 0.6 mg/0.1 mL, 18 mg/3 mL, subq PNIJ (VICTOZA 3-TOMASZ) 0.6 mg/0.1 mL (18 mg/3 mL) PnIj Inject 1.8 mg into the skin once daily.    metformin (GLUCOPHAGE-XR) 500 MG 24 hr tablet Take 2 tablets (1,000 mg total) by mouth 2 (two) times daily with meals.    methylPREDNISolone (MEDROL DOSEPACK) 4 mg tablet Use as directed on dose-pack -Pharmacy please specify directions for patient    omega-3 fatty acids-vitamin E (FISH OIL) 1,000 mg Cap Take 1 capsule by mouth 2 (two) times daily.    sertraline (ZOLOFT) 100 MG tablet 1 and half tablet daily " (Patient taking differently: Take 150 mg by mouth once daily. )    valsartan (DIOVAN) 320 MG tablet Take 1 tablet (320 mg total) by mouth once daily.    vitamin D 1000 units Tab Take 5 tablets (5,000 Units total) by mouth once daily.     Family History     Problem Relation (Age of Onset)    Cancer Father    Diabetes Maternal Aunt    Heart disease Mother    Heart failure Mother    Hypertension Mother        Social History Main Topics    Smoking status: Never Smoker    Smokeless tobacco: Never Used    Alcohol use No    Drug use: No    Sexual activity: Not on file     Review of Systems   Constitutional: Positive for activity change.   Respiratory: Negative for shortness of breath.    Cardiovascular: Negative for chest pain.   Gastrointestinal: Negative for nausea and vomiting.   Skin: Negative for rash and wound.   Neurological: Positive for headaches. Negative for weakness.   Psychiatric/Behavioral: Positive for confusion. Negative for agitation and behavioral problems.     Objective:     Vital Signs (Most Recent):  Temp: 98 °F (36.7 °C) (05/24/17 1708)  Pulse: 80 (05/24/17 1708)  Resp: 17 (05/24/17 1708)  BP: 128/76 (05/24/17 1708)  SpO2: 96 % (05/24/17 1708) Vital Signs (24h Range):  Temp:  [97.9 °F (36.6 °C)-99 °F (37.2 °C)] 98 °F (36.7 °C)  Pulse:  [77-99] 80  Resp:  [16-28] 17  SpO2:  [93 %-97 %] 96 %  BP: (104-159)/(66-84) 128/76     Weight: 118.5 kg (261 lb 3.9 oz)  Body mass index is 36.44 kg/m².    Physical Exam   Constitutional: He is oriented to person, place, and time. He appears well-developed and well-nourished. No distress.   HENT:   Head: Normocephalic and atraumatic.   Pulmonary/Chest: Effort normal.   Neurological: He is alert and oriented to person, place, and time.   Skin: Skin is warm and dry. He is not diaphoretic.   Psychiatric: His speech is normal.   Slightly withdrawn.  Less expressive than last admission.  Paucity of speech.   Nursing note and vitals reviewed.      NEUROLOGICAL  EXAMINATION:     MENTAL STATUS   Oriented to person, place, and time.   Registration: recalls 3 of 3 objects. Recall of objects at 5 minutes: Recalls 0/3 at five minutes.  Recalls 2/3 with numerous hints for each.   Attention: normal.   Speech: speech is normal   Level of consciousness: alert    CRANIAL NERVES     CN II   Visual fields full to confrontation.     CN V   Facial sensation intact.     CN VII   Facial expression full, symmetric.     CN VIII   Hearing: intact    CN XI   Right sternocleidomastoid strength: normal  Left sternocleidomastoid strength: normal    CN XII   Tongue: not atrophic  Fasciculations: absent  Tongue deviation: none    MOTOR EXAM   Muscle bulk: normal  Overall muscle tone: normal    Strength   Right biceps: 5/5  Left biceps: 5/5  Right triceps: 5/5  Left triceps: 5/5  Right quadriceps: 5/5  Left quadriceps: 5/5    Significant Labs:   Hemoglobin A1c:   Recent Labs  Lab 05/13/17  0138   HGBA1C 11.4*     Blood Culture:   Recent Labs  Lab 05/23/17  1057   LABBLOO No Growth to date  No Growth to date     BMP:   Recent Labs  Lab 05/23/17  1057 05/24/17  0626   * 353*    135*   K 4.6 5.0    104   CO2 21* 25   BUN 40* 35*   CREATININE 1.1 1.0   CALCIUM 8.8 8.5*     CBC:   Recent Labs  Lab 05/23/17  1058 05/24/17  0626   WBC 19.22* 15.66*   HGB 11.8* 10.1*   HCT 33.5* 29.0*    175     CMP:   Recent Labs  Lab 05/23/17  1057 05/24/17  0626   * 353*    135*   K 4.6 5.0    104   CO2 21* 25   BUN 40* 35*   CREATININE 1.1 1.0   CALCIUM 8.8 8.5*   PROT 5.6* 4.8*   ALBUMIN 2.8* 2.5*   BILITOT 0.8 0.6   ALKPHOS 62 63   AST 20 36   ALT 33 41   ANIONGAP 13 6*   EGFRNONAA >60 >60.0     Inflammatory Markers: No results for input(s): SEDRATE, CRP, PROCAL in the last 48 hours.  Urine Culture: No results for input(s): LABURIN in the last 48 hours.  Urine Studies:   Recent Labs  Lab 05/23/17  1716   COLORU Yellow   APPEARANCEUA Clear   PHUR 5.0   SPECGRAV 1.015    PROTEINUA Negative   GLUCUA Negative   KETONESU Negative   BILIRUBINUA Negative   OCCULTUA Negative   NITRITE Negative   UROBILINOGEN Negative   LEUKOCYTESUR Negative     All pertinent lab results from the past 24 hours have been reviewed.    Significant Imaging:    MRI brain 5/13/17:  Multiple foci of FLAIR hyperintensity in the deep cerebral periventricular white matter in a configuration and distribution which can be seen in the setting of underlying demyelinating process such as multiple sclerosis. There are several punctate foci of restricted diffusion including the left aspect of the midbrain tectum which in light of the aforementioned white matter changes may reflect regions of recent demyelination. Superimposed small acute infarcts would be difficult to exclude, although are felt less likely given the overall constellation of findings. Additionally, there is a 0.7 cm enhancing T2/FLAIR identified in the anterior right thalamus concerning for focus of active demyelination.           Also old lesions in corpus collosum

## 2017-05-24 NOTE — SUBJECTIVE & OBJECTIVE
"Past Medical History:   Diagnosis Date    Essential hypertension 2012    Internuclear ophthalmoplegia of left eye 2017    Mixed hyperlipidemia 2012    NAFLD (nonalcoholic fatty liver disease) 10/11/2013    CHASE (nonalcoholic steatohepatitis)     Obesity     Stroke     Type 2 diabetes mellitus with diabetic polyneuropathy, with long-term current use of insulin 2017       Past Surgical History:   Procedure Laterality Date    SKIN CANCER EXCISION         Review of patient's allergies indicates:  No Known Allergies    Current Facility-Administered Medications on File Prior to Encounter   Medication    [COMPLETED] 0.9%  NaCl infusion    [] 0.9%  NaCl infusion    [DISCONTINUED] 0.9%  NaCl infusion     Current Outpatient Prescriptions on File Prior to Encounter   Medication Sig    aspirin (ECOTRIN) 81 MG EC tablet Take 81 mg by mouth once daily.    atenolol (TENORMIN) 50 MG tablet Take 1 tablet (50 mg total) by mouth once daily.    atorvastatin (LIPITOR) 40 MG tablet Take 1 tablet (40 mg total) by mouth once daily.    diltiaZEM (DILACOR XR) 240 MG CDCR Take 1 capsule (240 mg total) by mouth once daily.    insulin degludec (TRESIBA FLEXTOUCH U-200) 200 unit/mL (3 mL) InPn Inject 42 Units into the skin every evening.    insulin needles, disposable, (BD ULTRA-FINE ANA PEN NEEDLES) 32 x 5/32 " Ndle Inject twice daily    liraglutide 0.6 mg/0.1 mL, 18 mg/3 mL, subq PNIJ (VICTOZA 3-TOMASZ) 0.6 mg/0.1 mL (18 mg/3 mL) PnIj Inject 1.8 mg into the skin once daily.    metformin (GLUCOPHAGE-XR) 500 MG 24 hr tablet Take 2 tablets (1,000 mg total) by mouth 2 (two) times daily with meals.    methylPREDNISolone (MEDROL DOSEPACK) 4 mg tablet Use as directed on dose-pack -Pharmacy please specify directions for patient    omega-3 fatty acids-vitamin E (FISH OIL) 1,000 mg Cap Take 1 capsule by mouth 2 (two) times daily.    sertraline (ZOLOFT) 100 MG tablet 1 and half tablet daily (Patient taking " differently: Take 150 mg by mouth once daily. )    valsartan (DIOVAN) 320 MG tablet Take 1 tablet (320 mg total) by mouth once daily.    vitamin D 1000 units Tab Take 5 tablets (5,000 Units total) by mouth once daily.     Family History     Problem Relation (Age of Onset)    Cancer Father    Diabetes Maternal Aunt    Heart disease Mother    Heart failure Mother    Hypertension Mother        Social History Main Topics    Smoking status: Never Smoker    Smokeless tobacco: Never Used    Alcohol use No    Drug use: No    Sexual activity: Not on file     Review of Systems   Constitutional: Negative for chills and fever.   HENT: Negative for rhinorrhea and sore throat.    Eyes: Negative for visual disturbance (report of double vision earlier, however currently resolved).   Respiratory: Negative for cough and shortness of breath.    Cardiovascular: Negative for chest pain and leg swelling.   Gastrointestinal: Negative for abdominal pain, blood in stool, constipation, diarrhea, nausea and vomiting.   Genitourinary: Negative for dysuria and hematuria.   Skin: Negative for color change.   Neurological: Positive for syncope and weakness. Negative for dizziness, seizures, speech difficulty, light-headedness and headaches.   Psychiatric/Behavioral: Positive for confusion.     Objective:     Vital Signs (Most Recent):  Temp: 99 °F (37.2 °C) (05/24/17 0034)  Pulse: 99 (05/24/17 0034)  Resp: (!) 28 (05/24/17 0034)  BP: 127/75 (05/24/17 0034)  SpO2: (!) 93 % (05/23/17 2300) Vital Signs (24h Range):  Temp:  [97.2 °F (36.2 °C)-99 °F (37.2 °C)] 99 °F (37.2 °C)  Pulse:  [78-99] 99  Resp:  [16-28] 28  SpO2:  [93 %-98 %] 93 %  BP: (111-153)/(60-84) 127/75        There is no height or weight on file to calculate BMI.    Physical Exam   Constitutional: He appears well-developed and well-nourished. No distress.   HENT:   Head: Normocephalic and atraumatic.   Mouth/Throat: Oropharynx is clear and moist.   Eyes: EOM are normal.   Left  pupil slightly larger than right. React well to light.   No double vision on EOM exam.    Neck: Normal range of motion.   Cardiovascular: Normal rate, regular rhythm and normal heart sounds.    Pulmonary/Chest: Effort normal and breath sounds normal.   Abdominal: Soft. Bowel sounds are normal. He exhibits no distension. There is no tenderness.   Musculoskeletal: Normal range of motion. He exhibits no edema.   Neurological: He is alert. No cranial nerve deficit.   Lower extremity weakness   Skin: Skin is warm and dry. He is not diaphoretic.   Vitals reviewed.       Significant Labs:   CBC:   Recent Labs  Lab 05/23/17  1058   WBC 19.22*   HGB 11.8*   HCT 33.5*        CMP:   Recent Labs  Lab 05/23/17  1057      K 4.6      CO2 21*   *   BUN 40*   CREATININE 1.1   CALCIUM 8.8   PROT 5.6*   ALBUMIN 2.8*   BILITOT 0.8   ALKPHOS 62   AST 20   ALT 33   ANIONGAP 13   EGFRNONAA >60     Lactic acid 3.3     UA: no UTI.   BCx sent.       Significant Imaging: I have reviewed all pertinent imaging results/findings within the past 24 hours.     CT Head: no acute process. Demyelination present.   1.  No CT evidence of an acute intracranial abnormality.  Soft tissue injury overlying the posterior left scalp.  2.  Atrophy and decreased attenuation involving the periventricular white matter felt to be related to demyelinating disease on the recent MRI examination.  Old lacunar infarct.    CXR: no acute process

## 2017-05-24 NOTE — HPI
"Patient is a 56 y/o male with poorly controlled Type 2 diabetes on insulin, HTN, mixed HLP and recently diagnosed with multiple sclerosis here at [facility] 2 weeks ago and discharged last week from [facility] from Hospital Medicine Team 3 after receiving 5 days of high dose IV Solumedrol. Patient was discharged on Medrol dospepak. Patient according to Dr. Cho states double vision was improving and getting better until this am when had a severe episode of double vision and dizziness and blacked out at home. Patient came around but was complaining of severe double vision and wife noted patient also confused after coming around so went to [facility] ER. Patient fell on the way to the bathroom. He does not remember falling, nor having any prodrome. Wife states she heard the thump and found him sitting on the floor, staring blankly into space, and confused.     At [place], patient noted to have WBC 19, but is on steroids. Blood culture sent and CXR done that was unremarkable. CT imaging of head unremarkable for any new findings. Patient noted to have  and BUN/Cr of 40/1.1 so Dr. Cho felt patient may just be dehydrated so patient given 1 liter of NS in ER. Patient feeling better. He spoke with Neurology here at [facility] who recommended admit for evaluation due to worsening double vision but Dr. Cho thinks likely just related to dehydration as patient has been having high blood sugars at home and during recent hospitalization.     On evaluation here at [facility], pt states he feels well; however wife explains he is still acting "not himself," confused. He denies current double or blurry vision. He endorses stumbling and lower leg weakness. He reports his BG run high and increased thirst.   "

## 2017-05-24 NOTE — PLAN OF CARE
Problem: Physical Therapy Goal  Goal: Physical Therapy Goal  Goals to be met by: 17     Patient will increase functional independence with mobility by performin. Pt to perf Czuka 10x sit<>stand test: 24.28 sec, with S to perf, to demonstrate improvements in B LE mm strength.  2. Bed to chair transfer with Supervision.  3. Pt to perf 6MWT: amb 616', with CGA PRN, to demonstrate a clinically significant improvement in aerobic capacity.    4. Lower extremity exercise program x 20 reps per handout, with assistance as needed.    Outcome: Ongoing (interventions implemented as appropriate)  PT goals established.

## 2017-05-24 NOTE — PLAN OF CARE
Admitted to room 8067 via ambulance in no distress.  Patient walked from stretcher to ambulance without  Difficutly.  Oriented patient to room.  Patient verbalized his understanding of instructions given.  Patient states when he first stands up from lying down he feels some dizziness.  Instructed patient to get up slowly from lying position.  Also  Instructed patient to sit on side of bed before trying to stand.  Patient verbalized his understanding of instructions ishvne.  Will continue to monitor patient.

## 2017-05-24 NOTE — PLAN OF CARE
discussd d/c plan with wife at bedside.  Wife concerned about lack of insurance coverage, states she did reschedule phone interview with Medicaid reviewer.  Has been rescheduled for tomorrow.  Application is pending as of 5/10/2017.  Wife to apply for disability for pt as well.      CM reassured wife that pt will continue to receive the medical care he needs.  CM will assist with any d/c needs that should arise.  Pt may also [facility] at time of d/c.  More to come...     05/24/17 1543   Discharge Assessment   Assessment Type Discharge Planning Assessment   Confirmed/corrected address and phone number on facesheet? Yes   Assessment information obtained from? Caregiver  (spouse)   Expected Length of Stay (days) 3   Communicated expected length of stay with patient/caregiver yes   Prior to hospitilization cognitive status: Alert/Oriented   Prior to hospitalization functional status: Independent   Current cognitive status: Unable to Assess   Current Functional Status: Needs Assistance   Arrived From home or self-care   Lives With spouse   Able to Return to Prior Arrangements yes   How many people do you have in your home that can help with your care after discharge? 1   Who are your caregiver(s) and their phone number(s)? (wife Kelly [phone])   Patient's perception of discharge disposition home or selfcare   Readmission Within The Last 30 Days no previous admission in last 30 days;previous discharge plan unsuccessful   Patient currently being followed by outpatient case management? No   Patient currently receives home health services? No   Does the patient currently use HME? No   Patient currently receives private duty nursing? N/A   Patient currently receives any other outside agency services? No   Equipment Currently Used at Home none   Do you have any problems affording any of your prescribed medications? Yes   If yes, what medications? (pt has no insurance, currently has 3 month supply  of all home meds )   Is the patient taking medications as prescribed? yes   Do you have any financial concerns preventing you from receiving the healthcare you need? Yes  (no insurance)   Does the patient have transportation to healthcare appointments? Yes   Transportation Available car   On Dialysis? No   Does the patient receive services at the [facility]mad[facility] Clinic? No   Are there any open cases? No   Discharge Plan B Home with family   Patient/Family In Agreement With Plan yes

## 2017-05-24 NOTE — H&P
[facility] Hospital Medicine  History & Physical    Patient Name: Bessy Nunez  MRN: [id number]  Admission Date: 5/23/2017  Attending Physician: Elvira Hendricks MD   Primary Care Provider: Edgar Nova MD    [facility] Medicine Team: [facility] HOSP MED 3 Donna Jason MD     Patient information was obtained from patient, past medical records and ER records.     Subjective:     Principal Problem:Syncope    Chief Complaint: No chief complaint on file.       HPI: Patient is a 56 y/o male with poorly controlled Type 2 diabetes on insulin, HTN, mixed HLP and recently diagnosed with multiple sclerosis here at [facility] 2 weeks ago and discharged last week from [facility] from Hospital Medicine Team 3 after receiving 5 days of high dose IV Solumedrol. Patient was discharged on Medrol dospepak. Patient according to Dr. Cho states double vision was improving and getting better until this am when had a severe episode of double vision and dizziness and blacked out at home. Patient came around but was complaining of severe double vision and wife noted patient also confused after coming around so went to [facility] ER. Patient fell on the way to the bathroom. He does not remember falling, nor having any prodrome. Wife states she heard the thump and found him sitting on the floor, staring blankly into space, and confused.     At [place], patient noted to have WBC 19, but is on steroids. Blood culture sent and CXR done that was unremarkable. CT imaging of head unremarkable for any new findings. Patient noted to have  and BUN/Cr of 40/1.1 so Dr. Cho felt patient may just be dehydrated so patient given 1 liter of NS in ER. Patient feeling better. He spoke with Neurology here at [facility] who recommended admit for evaluation due to worsening double vision but Dr. Cho thinks likely just related to dehydration as patient has been having high blood sugars at home and during recent  "hospitalization.     On evaluation here at [facility], pt states he feels well; however wife explains he is still acting "not himself," confused. He denies current double or blurry vision. He endorses stumbling and lower leg weakness. He reports his BG run high and increased thirst.     Past Medical History:   Diagnosis Date    Essential hypertension 2012    Internuclear ophthalmoplegia of left eye 2017    Mixed hyperlipidemia 2012    NAFLD (nonalcoholic fatty liver disease) 10/11/2013    CHASE (nonalcoholic steatohepatitis)     Obesity     Stroke     Type 2 diabetes mellitus with diabetic polyneuropathy, with long-term current use of insulin 2017       Past Surgical History:   Procedure Laterality Date    SKIN CANCER EXCISION         Review of patient's allergies indicates:  No Known Allergies    Current Facility-Administered Medications on File Prior to Encounter   Medication    [COMPLETED] 0.9%  NaCl infusion    [] 0.9%  NaCl infusion    [DISCONTINUED] 0.9%  NaCl infusion     Current Outpatient Prescriptions on File Prior to Encounter   Medication Sig    aspirin (ECOTRIN) 81 MG EC tablet Take 81 mg by mouth once daily.    atenolol (TENORMIN) 50 MG tablet Take 1 tablet (50 mg total) by mouth once daily.    atorvastatin (LIPITOR) 40 MG tablet Take 1 tablet (40 mg total) by mouth once daily.    diltiaZEM (DILACOR XR) 240 MG CDCR Take 1 capsule (240 mg total) by mouth once daily.    insulin degludec (TRESIBA FLEXTOUCH U-200) 200 unit/mL (3 mL) InPn Inject 42 Units into the skin every evening.    insulin needles, disposable, (BD ULTRA-FINE ANA PEN NEEDLES) 32 x 5/32 " Ndle Inject twice daily    liraglutide 0.6 mg/0.1 mL, 18 mg/3 mL, subq PNIJ (VICTOZA 3-TOMASZ) 0.6 mg/0.1 mL (18 mg/3 mL) PnIj Inject 1.8 mg into the skin once daily.    metformin (GLUCOPHAGE-XR) 500 MG 24 hr tablet Take 2 tablets (1,000 mg total) by mouth 2 (two) times daily with meals.    methylPREDNISolone (MEDROL " DOSEPACK) 4 mg tablet Use as directed on dose-pack -Pharmacy please specify directions for patient    omega-3 fatty acids-vitamin E (FISH OIL) 1,000 mg Cap Take 1 capsule by mouth 2 (two) times daily.    sertraline (ZOLOFT) 100 MG tablet 1 and half tablet daily (Patient taking differently: Take 150 mg by mouth once daily. )    valsartan (DIOVAN) 320 MG tablet Take 1 tablet (320 mg total) by mouth once daily.    vitamin D 1000 units Tab Take 5 tablets (5,000 Units total) by mouth once daily.     Family History     Problem Relation (Age of Onset)    Cancer Father    Diabetes Maternal Aunt    Heart disease Mother    Heart failure Mother    Hypertension Mother        Social History Main Topics    Smoking status: Never Smoker    Smokeless tobacco: Never Used    Alcohol use No    Drug use: No    Sexual activity: Not on file     Review of Systems   Constitutional: Negative for chills and fever.   HENT: Negative for rhinorrhea and sore throat.    Eyes: Negative for visual disturbance (report of double vision earlier, however currently resolved).   Respiratory: Negative for cough and shortness of breath.    Cardiovascular: Negative for chest pain and leg swelling.   Gastrointestinal: Negative for abdominal pain, blood in stool, constipation, diarrhea, nausea and vomiting.   Genitourinary: Negative for dysuria and hematuria.   Skin: Negative for color change.   Neurological: Positive for syncope and weakness. Negative for dizziness, seizures, speech difficulty, light-headedness and headaches.   Psychiatric/Behavioral: Positive for confusion.     Objective:     Vital Signs (Most Recent):  Temp: 99 °F (37.2 °C) (05/24/17 0034)  Pulse: 99 (05/24/17 0034)  Resp: (!) 28 (05/24/17 0034)  BP: 127/75 (05/24/17 0034)  SpO2: (!) 93 % (05/23/17 2300) Vital Signs (24h Range):  Temp:  [97.2 °F (36.2 °C)-99 °F (37.2 °C)] 99 °F (37.2 °C)  Pulse:  [78-99] 99  Resp:  [16-28] 28  SpO2:  [93 %-98 %] 93 %  BP: (111-153)/(60-84) 127/75         There is no height or weight on file to calculate BMI.    Physical Exam   Constitutional: He appears well-developed and well-nourished. No distress.   HENT:   Head: Normocephalic and atraumatic.   Mouth/Throat: Oropharynx is clear and moist.   Eyes: EOM are normal.   Left pupil slightly larger than right. React well to light.   No double vision on EOM exam.    Neck: Normal range of motion.   Cardiovascular: Normal rate, regular rhythm and normal heart sounds.    Pulmonary/Chest: Effort normal and breath sounds normal.   Abdominal: Soft. Bowel sounds are normal. He exhibits no distension. There is no tenderness.   Musculoskeletal: Normal range of motion. He exhibits no edema.   Neurological: He is alert. No cranial nerve deficit.   Lower extremity weakness   Skin: Skin is warm and dry. He is not diaphoretic.   Vitals reviewed.       Significant Labs:   CBC:   Recent Labs  Lab 05/23/17  1058   WBC 19.22*   HGB 11.8*   HCT 33.5*        CMP:   Recent Labs  Lab 05/23/17  1057      K 4.6      CO2 21*   *   BUN 40*   CREATININE 1.1   CALCIUM 8.8   PROT 5.6*   ALBUMIN 2.8*   BILITOT 0.8   ALKPHOS 62   AST 20   ALT 33   ANIONGAP 13   EGFRNONAA >60     Lactic acid 3.3     UA: no UTI.   BCx sent.       Significant Imaging: I have reviewed all pertinent imaging results/findings within the past 24 hours.     CT Head: no acute process. Demyelination present.   1.  No CT evidence of an acute intracranial abnormality.  Soft tissue injury overlying the posterior left scalp.  2.  Atrophy and decreased attenuation involving the periventricular white matter felt to be related to demyelinating disease on the recent MRI examination.  Old lacunar infarct.    CXR: no acute process     Assessment/Plan:     * Syncope    - Most likely 2/2 dehydration/ hypovolemia in setting of hyperglycemia (especially on steroids). Other ddx includes weakness/miscoordination 2/2 newly diagnosed MS, seizure (no history);  "stroke is less likely given negative head CT, cardiac arrhythmia less likely given normal EKG.   - Received 1L IVF at OSH. Will continue IVF, NS 150ml/hr x10hr.   - Fall precautions, seizure precautions.   - PT/OT given weakness.   - Neurology consult.   - Defer further steroid use to Neurology.         Type 2 diabetes mellitus with diabetic polyneuropathy, with long-term current use of insulin    - Hyperglycemic in setting of uncontrolled insulin dependent DM and steroid use.   - DM diet.   - Home regimen includes: tresiba 42U qhs, victoza 1.8mg once daily, metformin.   - Last hospitalization was on Detemir 32U qhs, and Aspart 22U TIDWM.   - Will start with Detemir 33U, Aspart 11U TIDWM, and LDSSI. Titrate as needed.   - Dietary consult for polyphagia in setting of uncontrolled DM        Diplopia    - Originally presenting symptom of MS.   - Resolved by the time of my evaluation. EOMI on exam.   - Diplopia following syncope may have been "unmasking" as opposed to acute neurologic problem.           Leukocytosis    - WBC 19, most likely 2/2 steroids, especially given no s/sx of infection.           Essential hypertension    - Cont home: ASA, Atenolol, Diltiazem, Valsartan.             Mixed hyperlipidemia    - Cont home Atorvastatin             Depressive disorder, not elsewhere classified    - cont home zoloft            VTE Risk Mitigation         Ordered     enoxaparin injection 40 mg  Daily     Route:  Subcutaneous        05/24/17 0554     Place ELIZABETH hose  Until discontinued      05/24/17 0303     Place sequential compression device  Until discontinued      05/24/17 0303     Medium Risk of VTE  Once      05/24/17 0303        Ppx: Lovanox. ELIZABETH, SCD.  Diet: Diabetic  PT/OT: Ordered   Dispo: Pending neuro evaluation.     Discussed with staff, Dr. Kimball.   CHANCE Jason MD MPH PGY-1  Department of [place] [facility] Medicine   [facility]  "

## 2017-05-24 NOTE — ASSESSMENT & PLAN NOTE
- Hyperglycemic in setting of uncontrolled insulin dependent DM and steroid use.   - DM diet.   - Home regimen includes: tresiba 42U qhs, victoza 1.8mg once daily, metformin.   - Last hospitalization was on Detemir 32U qhs, and Aspart 22U TIDWM.   - Will start with Detemir 33U, Aspart 11U TIDWM, and LDSSI. Titrate as needed.   - Dietary consult for polyphagia in setting of uncontrolled DM

## 2017-05-24 NOTE — ASSESSMENT & PLAN NOTE
"Acute on chronic.  Patient's wife notes more obvious change over the past few weeks (since initially hospitalized for L JAYDEN), but believes that the changes may have started earlier.  She notes that he started to cut back on work (because he was not able to continue working at full pace, she hypothesizes) several months ago.    May actually be chronic, progressive cognitive dysfunction 2/2 significant WM burden (seen on MRI), exacerbated by steroids -- although it should be noted that the patients "confusion" did start prior to initiation of steroids.      UA, CXR wnl this admission.      Recommend checking lyme titer, WNV, SSA, SSB, dsDNA, vasculitis labs, ESR, CRP, HIV, REYMUNDO  "

## 2017-05-24 NOTE — ASSESSMENT & PLAN NOTE
- Most likely 2/2 dehydration/ hypovolemia in setting of hyperglycemia (especially on steroids). Other ddx includes weakness/miscoordination 2/2 newly diagnosed MS, seizure (no history); stroke is less likely given negative head CT, cardiac arrhythmia less likely given normal EKG.   - Received 1L IVF at OSH. Will continue IVF, NS 150ml/hr x10hr.   - Fall precautions, seizure precautions.   - PT/OT given weakness.   - Neurology consult.   - Defer further steroid use to Neurology.

## 2017-05-24 NOTE — CONSULTS
[facility]  Neurology  Consult Note    Patient Name: Bessy Nunez  MRN: [id number]  Admission Date: 5/23/2017  Hospital Length of Stay: 1 days  Code Status: Full Code   Attending Provider: Elvira Hendricks MD   Consulting Provider: Sharon Luong MD  Primary Care Physician: Edgar Nova MD  Principal Problem:Syncope    Inpatient consult to Neurology  Consult performed by: SHARON LUONG  Consult ordered by: FILIBERTO LINDSEY    Inpatient consult to Neurology  Consult performed by: SHARON LUONG  Consult ordered by: SHIV COLEMAN         Subjective:     Chief Complaint:  Fall     HPI:   Mr. Cain is a 56 yo M well-known to our service who was recently DCd with L JYADEN 2/2 suspected MS, and acute encephalopathy.  He returns after a fall.  He and his wife report that his vision has been fairly stable; he continues to have blurred - not double - vision.  His wife reports that he is talking less and less, and his confusion has remained about the same.  Yesterday morning, he experienced a fall.  He now states that he remembers the fall, but in the past denied that he did.  She reports that she was woken up by a loud thump, and found him lying on the floor in the bathroom, staring off into space, covered in urine, not responding to voice or touch for approximately fifteen minutes.  This was followed by approximately one day of increased confusion and decreased speech.  He has no prior h/o seizures.  No tongue biting, convulsions, or eye-rolling appreciated during this episode.  Of note, his MS panel came back negative (no oligoclonal bands; normal IgG index).  He did receive 5.5g solumedrol last admission, with some improvement in vision, and definite improvement in L JAYDEN.     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    Mixed hyperlipidemia 11/9/2012    NAFLD (nonalcoholic fatty liver  "disease) 10/11/2013    CHASE (nonalcoholic steatohepatitis)     Obesity     Stroke     Type 2 diabetes mellitus with diabetic polyneuropathy, with long-term current use of insulin 2017       Past Surgical History:   Procedure Laterality Date    SKIN CANCER EXCISION         Review of patient's allergies indicates:  No Known Allergies    Current Neurological Medications: sertraline    Current Facility-Administered Medications on File Prior to Encounter   Medication    [] 0.9%  NaCl infusion    [DISCONTINUED] 0.9%  NaCl infusion     Current Outpatient Prescriptions on File Prior to Encounter   Medication Sig    aspirin (ECOTRIN) 81 MG EC tablet Take 81 mg by mouth once daily.    atenolol (TENORMIN) 50 MG tablet Take 1 tablet (50 mg total) by mouth once daily.    atorvastatin (LIPITOR) 40 MG tablet Take 1 tablet (40 mg total) by mouth once daily.    diltiaZEM (DILACOR XR) 240 MG CDCR Take 1 capsule (240 mg total) by mouth once daily.    insulin degludec (TRESIBA FLEXTOUCH U-200) 200 unit/mL (3 mL) InPn Inject 42 Units into the skin every evening.    insulin needles, disposable, (BD ULTRA-FINE ANA PEN NEEDLES) 32 x 5/32 " Ndle Inject twice daily    liraglutide 0.6 mg/0.1 mL, 18 mg/3 mL, subq PNIJ (VICTOZA 3-TOMASZ) 0.6 mg/0.1 mL (18 mg/3 mL) PnIj Inject 1.8 mg into the skin once daily.    metformin (GLUCOPHAGE-XR) 500 MG 24 hr tablet Take 2 tablets (1,000 mg total) by mouth 2 (two) times daily with meals.    methylPREDNISolone (MEDROL DOSEPACK) 4 mg tablet Use as directed on dose-pack -Pharmacy please specify directions for patient    omega-3 fatty acids-vitamin E (FISH OIL) 1,000 mg Cap Take 1 capsule by mouth 2 (two) times daily.    sertraline (ZOLOFT) 100 MG tablet 1 and half tablet daily (Patient taking differently: Take 150 mg by mouth once daily. )    valsartan (DIOVAN) 320 MG tablet Take 1 tablet (320 mg total) by mouth once daily.    vitamin D 1000 units Tab Take 5 tablets (5,000 " Units total) by mouth once daily.     Family History     Problem Relation (Age of Onset)    Cancer Father    Diabetes Maternal Aunt    Heart disease Mother    Heart failure Mother    Hypertension Mother        Social History Main Topics    Smoking status: Never Smoker    Smokeless tobacco: Never Used    Alcohol use No    Drug use: No    Sexual activity: Not on file     Review of Systems   Constitutional: Positive for activity change.   Respiratory: Negative for shortness of breath.    Cardiovascular: Negative for chest pain.   Gastrointestinal: Negative for nausea and vomiting.   Skin: Negative for rash and wound.   Neurological: Positive for headaches. Negative for weakness.   Psychiatric/Behavioral: Positive for confusion. Negative for agitation and behavioral problems.     Objective:     Vital Signs (Most Recent):  Temp: 98 °F (36.7 °C) (05/24/17 1708)  Pulse: 80 (05/24/17 1708)  Resp: 17 (05/24/17 1708)  BP: 128/76 (05/24/17 1708)  SpO2: 96 % (05/24/17 1708) Vital Signs (24h Range):  Temp:  [97.9 °F (36.6 °C)-99 °F (37.2 °C)] 98 °F (36.7 °C)  Pulse:  [77-99] 80  Resp:  [16-28] 17  SpO2:  [93 %-97 %] 96 %  BP: (104-159)/(66-84) 128/76     Weight: 118.5 kg (261 lb 3.9 oz)  Body mass index is 36.44 kg/m².    Physical Exam   Constitutional: He is oriented to person, place, and time. He appears well-developed and well-nourished. No distress.   HENT:   Head: Normocephalic and atraumatic.   Pulmonary/Chest: Effort normal.   Neurological: He is alert and oriented to person, place, and time.   Skin: Skin is warm and dry. He is not diaphoretic.   Psychiatric: His speech is normal.   Slightly withdrawn.  Less expressive than last admission.  Paucity of speech.   Nursing note and vitals reviewed.      NEUROLOGICAL EXAMINATION:     MENTAL STATUS   Oriented to person, place, and time.   Registration: recalls 3 of 3 objects. Recall of objects at 5 minutes: Recalls 0/3 at five minutes.  Recalls 2/3 with numerous hints for  each.   Attention: normal.   Speech: speech is normal   Level of consciousness: alert    CRANIAL NERVES     CN II   Visual fields full to confrontation.     CN V   Facial sensation intact.     CN VII   Facial expression full, symmetric.     CN VIII   Hearing: intact    CN XI   Right sternocleidomastoid strength: normal  Left sternocleidomastoid strength: normal    CN XII   Tongue: not atrophic  Fasciculations: absent  Tongue deviation: none    MOTOR EXAM   Muscle bulk: normal  Overall muscle tone: normal    Strength   Right biceps: 5/5  Left biceps: 5/5  Right triceps: 5/5  Left triceps: 5/5  Right quadriceps: 5/5  Left quadriceps: 5/5    Significant Labs:   Hemoglobin A1c:   Recent Labs  Lab 05/13/17  0138   HGBA1C 11.4*     Blood Culture:   Recent Labs  Lab 05/23/17  1057   LABBLOO No Growth to date  No Growth to date     BMP:   Recent Labs  Lab 05/23/17  1057 05/24/17  0626   * 353*    135*   K 4.6 5.0    104   CO2 21* 25   BUN 40* 35*   CREATININE 1.1 1.0   CALCIUM 8.8 8.5*     CBC:   Recent Labs  Lab 05/23/17  1058 05/24/17  0626   WBC 19.22* 15.66*   HGB 11.8* 10.1*   HCT 33.5* 29.0*    175     CMP:   Recent Labs  Lab 05/23/17  1057 05/24/17  0626   * 353*    135*   K 4.6 5.0    104   CO2 21* 25   BUN 40* 35*   CREATININE 1.1 1.0   CALCIUM 8.8 8.5*   PROT 5.6* 4.8*   ALBUMIN 2.8* 2.5*   BILITOT 0.8 0.6   ALKPHOS 62 63   AST 20 36   ALT 33 41   ANIONGAP 13 6*   EGFRNONAA >60 >60.0     Inflammatory Markers: No results for input(s): SEDRATE, CRP, PROCAL in the last 48 hours.  Urine Culture: No results for input(s): LABURIN in the last 48 hours.  Urine Studies:   Recent Labs  Lab 05/23/17  1716   COLORU Yellow   APPEARANCEUA Clear   PHUR 5.0   SPECGRAV 1.015   PROTEINUA Negative   GLUCUA Negative   KETONESU Negative   BILIRUBINUA Negative   OCCULTUA Negative   NITRITE Negative   UROBILINOGEN Negative   LEUKOCYTESUR Negative     All pertinent lab results from the past  "24 hours have been reviewed.    Significant Imaging:    MRI brain 5/13/17:  Multiple foci of FLAIR hyperintensity in the deep cerebral periventricular white matter in a configuration and distribution which can be seen in the setting of underlying demyelinating process such as multiple sclerosis. There are several punctate foci of restricted diffusion including the left aspect of the midbrain tectum which in light of the aforementioned white matter changes may reflect regions of recent demyelination. Superimposed small acute infarcts would be difficult to exclude, although are felt less likely given the overall constellation of findings. Additionally, there is a 0.7 cm enhancing T2/FLAIR identified in the anterior right thalamus concerning for focus of active demyelination.           Also old lesions in corpus collosum         Assessment and Plan:     Fall    ?seizure    Recommend 24h EEG        Encephalopathy    Acute on chronic.  Patient's wife notes more obvious change over the past few weeks (since initially hospitalized for L JAYDEN), but believes that the changes may have started earlier.  She notes that he started to cut back on work (because he was not able to continue working at full pace, she hypothesizes) several months ago.    May actually be chronic, progressive cognitive dysfunction 2/2 significant WM burden (seen on MRI), exacerbated by steroids -- although it should be noted that the patients "confusion" did start prior to initiation of steroids.      UA, CXR wnl this admission.      Recommend checking lyme titer, WNV, SSA, SSB, dsDNA, vasculitis labs, ESR, CRP, HIV, REYMUNDO        Diplopia    Improving        Demyelinating changes in brain    As seen on MRIs from 5/13  S/p 5.5g solumedrol last admission  Discussed with MS team - will need outpatient f/u appointment        Internuclear ophthalmoplegia of left eye    Improving  Received 5.5g solumedrol last admission  May be 2/2 MS (suspected, but not " confirmed)        Depressive disorder, not elsewhere classified    Continue antidepressant.  Consider adjusting zoloft.              VTE Risk Mitigation         Ordered     enoxaparin injection 40 mg  Daily     Route:  Subcutaneous        05/24/17 0554     Place ELIZABETH hose  Until discontinued      05/24/17 0303     Place sequential compression device  Until discontinued      05/24/17 0303     Medium Risk of VTE  Once      05/24/17 0303          Thank you for your consult. I will follow-up with patient. Please contact us if you have any additional questions.    Sharon Luong MD  Neurology  [facility]    I have personally taken the history and examined the patient and agree with the resident's note as stated above.    Aldo Mercedes MD, JONATAN, FAAN  Department of Neurology   [facility] [place], [place]

## 2017-05-24 NOTE — PLAN OF CARE
"Problem: Patient Care Overview  Goal: Plan of Care Review  Outcome: Ongoing (interventions implemented as appropriate)  Pt remained free from injury during shift. VSS. Pt AAOx4. Ordered neuro checks q4hr WNL. At times has flat affect but is pleasant in conversation. Appearing forgetful at times when RN went to check BG pt asking "why are you checking everytime before I eat?" RN reinforcing glucose checks prior to meal time. Insulin and SSI given with each meal today. RN spoke to dietician who will be making rounds tomorrow am on pt. NS infusing @150ml/hr during shift. NS ordered to stop at 1800. Bed alarm remains on while pt in bed. Pt reminded to call for assistance while OOB. Wife remains attentive to pt needs. Call light in reach. Will continue to monitor.       "

## 2017-05-24 NOTE — HPI
Mr. Nunez is a 56 yo M well-known to our service who was recently DCd with L JAYDEN 2/2 suspected MS, and acute encephalopathy.  He returns after a fall.  He and his wife report that his vision has been fairly stable; he continues to have blurred - not double - vision.  His wife reports that he is talking less and less, and his confusion has remained about the same.  Yesterday morning, he experienced a fall.  He now states that he remembers the fall, but in the past denied that he did.  She reports that she was woken up by a loud thump, and found him lying on the floor in the bathroom, staring off into space, covered in urine, not responding to voice or touch for approximately fifteen minutes.  This was followed by approximately one day of increased confusion and decreased speech.  He has no prior h/o seizures.  No tongue biting, convulsions, or eye-rolling appreciated during this episode.  Of note, his MS panel came back negative (no oligoclonal bands; normal IgG index).  He did receive 5.5g solumedrol last admission, with some improvement in vision, and definite improvement in L JAYDEN.

## 2017-05-25 LAB
ALBUMIN SERPL BCP-MCNC: 2.3 G/DL
ALP SERPL-CCNC: 67 U/L
ALT SERPL W/O P-5'-P-CCNC: 42 U/L
ANION GAP SERPL CALC-SCNC: 8 MMOL/L
ANISOCYTOSIS BLD QL SMEAR: SLIGHT
AST SERPL-CCNC: 30 U/L
BASOPHILS NFR BLD: 0 %
BILIRUB SERPL-MCNC: 0.5 MG/DL
BUN SERPL-MCNC: 31 MG/DL
C3 SERPL-MCNC: 111 MG/DL
C4 SERPL-MCNC: 32 MG/DL
CALCIUM SERPL-MCNC: 8.1 MG/DL
CHLORIDE SERPL-SCNC: 105 MMOL/L
CO2 SERPL-SCNC: 22 MMOL/L
CREAT SERPL-MCNC: 0.9 MG/DL
CRP SERPL-MCNC: 5.37 MG/L
DIFFERENTIAL METHOD: ABNORMAL
EOSINOPHIL NFR BLD: 1 %
ERYTHROCYTE [DISTWIDTH] IN BLOOD BY AUTOMATED COUNT: 13.2 %
ERYTHROCYTE [SEDIMENTATION RATE] IN BLOOD BY WESTERGREN METHOD: 15 MM/HR
EST. GFR  (AFRICAN AMERICAN): >60 ML/MIN/1.73 M^2
EST. GFR  (NON AFRICAN AMERICAN): >60 ML/MIN/1.73 M^2
GLUCOSE SERPL-MCNC: 223 MG/DL
HCT VFR BLD AUTO: 25.1 %
HGB BLD-MCNC: 8.8 G/DL
HIV 1+2 AB+HIV1 P24 AG SERPL QL IA: NEGATIVE
HYPOCHROMIA BLD QL SMEAR: ABNORMAL
LYMPHOCYTES NFR BLD: 30 %
MCH RBC QN AUTO: 29.5 PG
MCHC RBC AUTO-ENTMCNC: 35.1 %
MCV RBC AUTO: 84 FL
MONOCYTES NFR BLD: 3 %
NEUTROPHILS NFR BLD: 66 %
OVALOCYTES BLD QL SMEAR: ABNORMAL
PLATELET # BLD AUTO: 164 K/UL
PLATELET BLD QL SMEAR: ABNORMAL
PMV BLD AUTO: 9.4 FL
POCT GLUCOSE: 182 MG/DL (ref 70–110)
POCT GLUCOSE: 196 MG/DL (ref 70–110)
POCT GLUCOSE: 301 MG/DL (ref 70–110)
POIKILOCYTOSIS BLD QL SMEAR: SLIGHT
POLYCHROMASIA BLD QL SMEAR: ABNORMAL
POTASSIUM SERPL-SCNC: 4 MMOL/L
PROT SERPL-MCNC: 4.6 G/DL
RBC # BLD AUTO: 2.98 M/UL
SODIUM SERPL-SCNC: 135 MMOL/L
WBC # BLD AUTO: 16.82 K/UL

## 2017-05-25 PROCEDURE — 86225 DNA ANTIBODY NATIVE: CPT

## 2017-05-25 PROCEDURE — 63600175 PHARM REV CODE 636 W HCPCS: Performed by: STUDENT IN AN ORGANIZED HEALTH CARE EDUCATION/TRAINING PROGRAM

## 2017-05-25 PROCEDURE — 85027 COMPLETE CBC AUTOMATED: CPT

## 2017-05-25 PROCEDURE — 83519 RIA NONANTIBODY: CPT | Mod: 59

## 2017-05-25 PROCEDURE — 20600001 HC STEP DOWN PRIVATE ROOM

## 2017-05-25 PROCEDURE — 4A10X4Z MONITORING OF CENTRAL NERVOUS ELECTRICAL ACTIVITY, EXTERNAL APPROACH: ICD-10-PCS | Performed by: PSYCHIATRY & NEUROLOGY

## 2017-05-25 PROCEDURE — 86618 LYME DISEASE ANTIBODY: CPT

## 2017-05-25 PROCEDURE — 80053 COMPREHEN METABOLIC PANEL: CPT

## 2017-05-25 PROCEDURE — 20000000 HC ICU ROOM

## 2017-05-25 PROCEDURE — 86038 ANTINUCLEAR ANTIBODIES: CPT

## 2017-05-25 PROCEDURE — 86160 COMPLEMENT ANTIGEN: CPT

## 2017-05-25 PROCEDURE — 86703 HIV-1/HIV-2 1 RESULT ANTBDY: CPT

## 2017-05-25 PROCEDURE — 95957 EEG DIGITAL ANALYSIS: CPT

## 2017-05-25 PROCEDURE — 86160 COMPLEMENT ANTIGEN: CPT | Mod: 59

## 2017-05-25 PROCEDURE — 85651 RBC SED RATE NONAUTOMATED: CPT

## 2017-05-25 PROCEDURE — 36415 COLL VENOUS BLD VENIPUNCTURE: CPT

## 2017-05-25 PROCEDURE — 25000003 PHARM REV CODE 250: Performed by: STUDENT IN AN ORGANIZED HEALTH CARE EDUCATION/TRAINING PROGRAM

## 2017-05-25 PROCEDURE — 86141 C-REACTIVE PROTEIN HS: CPT

## 2017-05-25 PROCEDURE — 99233 SBSQ HOSP IP/OBS HIGH 50: CPT | Mod: ,,, | Performed by: HOSPITALIST

## 2017-05-25 PROCEDURE — 86235 NUCLEAR ANTIGEN ANTIBODY: CPT

## 2017-05-25 PROCEDURE — 86235 NUCLEAR ANTIGEN ANTIBODY: CPT | Mod: 59

## 2017-05-25 PROCEDURE — 86256 FLUORESCENT ANTIBODY TITER: CPT | Mod: 91

## 2017-05-25 PROCEDURE — 95951 HC EEG MONITORING/VIDEO RECORD: CPT

## 2017-05-25 PROCEDURE — 87798 DETECT AGENT NOS DNA AMP: CPT

## 2017-05-25 PROCEDURE — 99233 SBSQ HOSP IP/OBS HIGH 50: CPT | Mod: ,,, | Performed by: PSYCHIATRY & NEUROLOGY

## 2017-05-25 PROCEDURE — 85007 BL SMEAR W/DIFF WBC COUNT: CPT

## 2017-05-25 PROCEDURE — 95813 EEG EXTND MNTR 61-119 MIN: CPT | Mod: 26,,, | Performed by: PSYCHIATRY & NEUROLOGY

## 2017-05-25 RX ORDER — CHOLECALCIFEROL (VITAMIN D3) 25 MCG
5000 TABLET ORAL DAILY
Status: DISCONTINUED | OUTPATIENT
Start: 2017-05-25 | End: 2017-06-04 | Stop reason: HOSPADM

## 2017-05-25 RX ORDER — INSULIN ASPART 100 [IU]/ML
16 INJECTION, SOLUTION INTRAVENOUS; SUBCUTANEOUS
Status: DISCONTINUED | OUTPATIENT
Start: 2017-05-25 | End: 2017-05-26

## 2017-05-25 RX ADMIN — INSULIN ASPART 1 UNITS: 100 INJECTION, SOLUTION INTRAVENOUS; SUBCUTANEOUS at 09:05

## 2017-05-25 RX ADMIN — INSULIN ASPART 16 UNITS: 100 INJECTION, SOLUTION INTRAVENOUS; SUBCUTANEOUS at 04:05

## 2017-05-25 RX ADMIN — SERTRALINE HYDROCHLORIDE 150 MG: 50 TABLET ORAL at 09:05

## 2017-05-25 RX ADMIN — INSULIN ASPART 16 UNITS: 100 INJECTION, SOLUTION INTRAVENOUS; SUBCUTANEOUS at 12:05

## 2017-05-25 RX ADMIN — VALSARTAN 320 MG: 160 TABLET, FILM COATED ORAL at 09:05

## 2017-05-25 RX ADMIN — INSULIN ASPART 11 UNITS: 100 INJECTION, SOLUTION INTRAVENOUS; SUBCUTANEOUS at 09:05

## 2017-05-25 RX ADMIN — ENOXAPARIN SODIUM 40 MG: 100 INJECTION SUBCUTANEOUS at 04:05

## 2017-05-25 RX ADMIN — VITAMIN D, TAB 1000IU (100/BT) 5000 UNITS: 25 TAB at 04:05

## 2017-05-25 RX ADMIN — ASPIRIN 81 MG: 81 TABLET, COATED ORAL at 09:05

## 2017-05-25 RX ADMIN — ATORVASTATIN CALCIUM 40 MG: 20 TABLET, FILM COATED ORAL at 09:05

## 2017-05-25 RX ADMIN — DILTIAZEM HYDROCHLORIDE 240 MG: 240 CAPSULE, COATED, EXTENDED RELEASE ORAL at 09:05

## 2017-05-25 RX ADMIN — ATENOLOL 50 MG: 25 TABLET ORAL at 09:05

## 2017-05-25 RX ADMIN — INSULIN ASPART 4 UNITS: 100 INJECTION, SOLUTION INTRAVENOUS; SUBCUTANEOUS at 12:05

## 2017-05-25 NOTE — PHARMACY MED REC
"[facility] Medication Reconciliation  Template    Patient was admitted on 5/23/2017 for Syncope.    Patient's prior to admission medication regimen was as follows:  Prescriptions Prior to Admission   Medication Sig Dispense Refill Last Dose    aspirin (ECOTRIN) 81 MG EC tablet Take 81 mg by mouth once daily.   5/13/2017    atenolol (TENORMIN) 50 MG tablet Take 1 tablet (50 mg total) by mouth once daily. 90 tablet 3 5/13/2017    atorvastatin (LIPITOR) 40 MG tablet Take 1 tablet (40 mg total) by mouth once daily. 90 tablet 3 5/13/2017    diltiaZEM (DILACOR XR) 240 MG CDCR Take 1 capsule (240 mg total) by mouth once daily. 90 capsule 3 5/13/2017    insulin degludec (TRESIBA FLEXTOUCH U-200) 200 unit/mL (3 mL) InPn Inject 42 Units into the skin every evening. 3 Syringe 1 5/12/2017 at Unknown time    insulin needles, disposable, (BD ULTRA-FINE ANA PEN NEEDLES) 32 x 5/32 " Ndle Inject twice daily 100 each 3 Past Week at Unknown time    liraglutide 0.6 mg/0.1 mL, 18 mg/3 mL, subq PNIJ (VICTOZA 3-TOMASZ) 0.6 mg/0.1 mL (18 mg/3 mL) PnIj Inject 1.8 mg into the skin once daily. 9 mL 11 5/13/2017    metformin (GLUCOPHAGE-XR) 500 MG 24 hr tablet Take 2 tablets (1,000 mg total) by mouth 2 (two) times daily with meals. 360 tablet 0 5/13/2017    methylPREDNISolone (MEDROL DOSEPACK) 4 mg tablet Use as directed on dose-pack -Pharmacy please specify directions for patient 1 Package 0     omega-3 fatty acids-vitamin E (FISH OIL) 1,000 mg Cap Take 1 capsule by mouth 2 (two) times daily.  0 5/13/2017    sertraline (ZOLOFT) 100 MG tablet 1 and half tablet daily (Patient taking differently: Take 150 mg by mouth once daily. ) 135 tablet 3 5/13/2017    valsartan (DIOVAN) 320 MG tablet Take 1 tablet (320 mg total) by mouth once daily. 90 tablet 3 5/13/2017    vitamin D 1000 units Tab Take 5 tablets (5,000 Units total) by mouth once daily.        Please add appropriate    SmartPhrase below:  Admission Medication Reconciliation - " "Pharmacy Consult Note    The home medication history was taken by Ebonie Cortez, pharmacy technician.  Based on information gathered and subsequent review by the clinical pharmacist, the items below may need attention.     You may go to "Admission" then "Reconcile Home Medications" tabs to review and/or act upon these items. Based on information gathered and subsequent review by the clinical pharmacist, the items below may need attention.    Potentially problematic discrepancies with current MAR  o Patient IS taking the following which was not ordered upon admit  o Liraglutide 1.8 mg QD (held)  o Metformin XR 1000 mg BID (held)  o Vitamin D 5000 Units QD    Potential issues to be addressed PRIOR TO DISCHARGE  o Metformin XR 1000 mg BID- consider adjusting frequency on discharge (this is extended release formulation and should be administered once daily not twice daily)     Please address this information as you see fit.  Feel free to contact us if you have any questions or require assistance.    Cheryl Stuart  EXT [phone]        "

## 2017-05-25 NOTE — ASSESSMENT & PLAN NOTE
Continue antidepressant.    Patient's wife reports that he has been taking 100mg zoloft, not 150mg, as reported in a pharmacy note(?).    Consider titration/medication adjustment for improved symptom control

## 2017-05-25 NOTE — SUBJECTIVE & OBJECTIVE
Subjective:     Interval History: One or two additional episodes of staring with urinary incontinence, as noticed by wife.  Patient does not recall these episodes.    Current Neurological Medications: sertraline    Current Facility-Administered Medications   Medication Dose Route Frequency Provider Last Rate Last Dose    aspirin EC tablet 81 mg  81 mg Oral Daily Donna Jason MD   81 mg at 05/25/17 0917    atenolol tablet 50 mg  50 mg Oral Daily Donna Jason MD   50 mg at 05/25/17 0918    atorvastatin tablet 40 mg  40 mg Oral Daily Donna Jason MD   40 mg at 05/25/17 0917    dextrose 50% injection 12.5 g  12.5 g Intravenous PRN Donna Jason MD        dextrose 50% injection 25 g  25 g Intravenous PRN Donna Jason MD        diltiaZEM 24 hr capsule 240 mg  240 mg Oral Daily Donna Jason MD   240 mg at 05/25/17 0918    enoxaparin injection 40 mg  40 mg Subcutaneous Daily Donna Jason MD   40 mg at 05/24/17 1709    glucagon (human recombinant) injection 1 mg  1 mg Intramuscular PRN Donna Jason MD        glucose chewable tablet 16 g  16 g Oral PRN Donna Jason MD        glucose chewable tablet 24 g  24 g Oral PRN Donna Jason MD        insulin aspart pen 0-5 Units  0-5 Units Subcutaneous QID (AC + HS) PRN Donna Jason MD   2 Units at 05/24/17 2110    insulin aspart pen 16 Units  16 Units Subcutaneous TIDWM Guanaco Story MD        insulin detemir pen 36 Units  36 Units Subcutaneous QHS Guanaco Story MD        sertraline tablet 150 mg  150 mg Oral Daily Donna Jason MD   150 mg at 05/25/17 0918    valsartan tablet 320 mg  320 mg Oral Daily Donna Jason MD   320 mg at 05/25/17 0923       Review of Systems   Constitutional: Positive for activity change.   Respiratory: Negative for shortness of breath.    Cardiovascular:  Negative for chest pain.   Gastrointestinal: Negative for nausea and vomiting.   Skin: Negative for rash and wound.   Neurological: Positive for headaches. Negative for weakness.   Psychiatric/Behavioral: Positive for confusion. Negative for agitation and behavioral problems.     Objective:     Vital Signs (Most Recent):  Temp: 98.5 °F (36.9 °C) (05/25/17 0815)  Pulse: 72 (05/25/17 0815)  Resp: 18 (05/25/17 0815)  BP: 120/67 (05/25/17 0815)  SpO2: 99 % (05/25/17 0815) Vital Signs (24h Range):  Temp:  [98 °F (36.7 °C)-99.3 °F (37.4 °C)] 98.5 °F (36.9 °C)  Pulse:  [66-81] 72  Resp:  [17-18] 18  SpO2:  [96 %-99 %] 99 %  BP: (115-131)/(57-76) 120/67     Weight: 118 kg (260 lb 2.3 oz)  Body mass index is 36.28 kg/m².    Physical Exam   Constitutional: He is oriented to person, place, and time. He appears well-developed and well-nourished. No distress.   HENT:   Head: Normocephalic and atraumatic.   Pulmonary/Chest: Effort normal.   Neurological: He is alert and oriented to person, place, and time.   Skin: Skin is warm and dry. He is not diaphoretic.   Psychiatric: His speech is normal.   Slightly withdrawn.  Less expressive than last admission.  Paucity of speech.   Nursing note and vitals reviewed.      NEUROLOGICAL EXAMINATION:     MENTAL STATUS   Oriented to person, place, and time.   Attention: normal.   Speech: speech is normal   Level of consciousness: alert       Able to answer numerous questions about his son and his pets.  Volunteers minimal information this admission, in contrast to last admission, when he would chitchat no matter what I was doing (LP, raising his leg, etc.)     CRANIAL NERVES     CN II   Visual fields full to confrontation.     CN V   Facial sensation intact.     CN VII   Facial expression full, symmetric.     CN VIII   Hearing: intact    CN XI   Right sternocleidomastoid strength: normal  Left sternocleidomastoid strength: normal    CN XII   Tongue: not atrophic  Fasciculations:  absent  Tongue deviation: none       Mild L JAYDEN.  Improved since last admission.     MOTOR EXAM   Muscle bulk: normal  Overall muscle tone: normal    Strength   Right biceps: 5/5  Left biceps: 5/5  Right triceps: 5/5  Left triceps: 5/5  Right quadriceps: 5/5  Left quadriceps: 5/5    SENSORY EXAM   Light touch normal.       Significant Labs:   Hemoglobin A1c:   Recent Labs  Lab 05/13/17  0138   HGBA1C 11.4*     Blood Culture: No results for input(s): LABBLOO in the last 48 hours.  BMP:   Recent Labs  Lab 05/24/17 0626 05/25/17  0518   * 223*   * 135*   K 5.0 4.0    105   CO2 25 22*   BUN 35* 31*   CREATININE 1.0 0.9   CALCIUM 8.5* 8.1*     CBC:   Recent Labs  Lab 05/24/17 0626 05/25/17 0518   WBC 15.66* 16.82*   HGB 10.1* 8.8*   HCT 29.0* 25.1*    164     CMP:   Recent Labs  Lab 05/24/17 0626 05/25/17  0518   * 223*   * 135*   K 5.0 4.0    105   CO2 25 22*   BUN 35* 31*   CREATININE 1.0 0.9   CALCIUM 8.5* 8.1*   PROT 4.8* 4.6*   ALBUMIN 2.5* 2.3*   BILITOT 0.6 0.5   ALKPHOS 63 67   AST 36 30   ALT 41 42   ANIONGAP 6* 8   EGFRNONAA >60.0 >60.0     Inflammatory Markers:   Recent Labs  Lab 05/25/17  0816   SEDRATE 15*     Urine Culture: No results for input(s): LABURIN in the last 48 hours.  Urine Studies:   Recent Labs  Lab 05/23/17  1716   COLORU Yellow   APPEARANCEUA Clear   PHUR 5.0   SPECGRAV 1.015   PROTEINUA Negative   GLUCUA Negative   KETONESU Negative   BILIRUBINUA Negative   OCCULTUA Negative   NITRITE Negative   UROBILINOGEN Negative   LEUKOCYTESUR Negative     All pertinent lab results from the past 24 hours have been reviewed.    Significant Imaging:    MRI brain 5/13/17:  Multiple foci of FLAIR hyperintensity in the deep cerebral periventricular white matter in a configuration and distribution which can be seen in the setting of underlying demyelinating process such as multiple sclerosis. There are several punctate foci of restricted diffusion including the  left aspect of the midbrain tectum which in light of the aforementioned white matter changes may reflect regions of recent demyelination. Superimposed small acute infarcts would be difficult to exclude, although are felt less likely given the overall constellation of findings. Additionally, there is a 0.7 cm enhancing T2/FLAIR identified in the anterior right thalamus concerning for focus of active demyelination.           Also old lesions in corpus collosum

## 2017-05-25 NOTE — ASSESSMENT & PLAN NOTE
"Acute on chronic.  Patient's wife notes more obvious change over the past few weeks (since initially hospitalized for L JAYDEN), but believes that the changes may have started earlier.  She notes that he started to cut back on work (because he was not able to continue working at full pace, she hypothesizes) several months ago.    May actually be chronic, progressive cognitive dysfunction 2/2 significant WM burden (seen on MRI), exacerbated by steroids -- although it should be noted that the patients "confusion" did start prior to initiation of steroids.      UA, CXR wnl this admission.      F/u lyme titer, WNV, SSA, SSB, dsDNA, vasculitis labs, ESR, CRP, HIV, REYMUNDO, as well as an autoimmune encephalopathy panel    24h EEG to assess for simple partial seizures / underlying etiology of waxing and waning MS  "

## 2017-05-25 NOTE — PROGRESS NOTES
[facility]  Neurology  Progress Note    Patient Name: Bessy Nunez  MRN: [id number]  Admission Date: 5/23/2017  Hospital Length of Stay: 2 days  Code Status: Full Code   Attending Provider: Elvira Hendricks MD  Primary Care Physician: Edgar Nova MD   Principal Problem:Syncope      Subjective:     Interval History: One or two additional episodes of staring with urinary incontinence, as noticed by wife.  Patient does not recall these episodes.    Current Neurological Medications: sertraline    Current Facility-Administered Medications   Medication Dose Route Frequency Provider Last Rate Last Dose    aspirin EC tablet 81 mg  81 mg Oral Daily Donna Jason MD   81 mg at 05/25/17 0917    atenolol tablet 50 mg  50 mg Oral Daily Donna Jason MD   50 mg at 05/25/17 0918    atorvastatin tablet 40 mg  40 mg Oral Daily Donna Jason MD   40 mg at 05/25/17 0917    dextrose 50% injection 12.5 g  12.5 g Intravenous PRN Donna Jason MD        dextrose 50% injection 25 g  25 g Intravenous PRN Donna Jason MD        diltiaZEM 24 hr capsule 240 mg  240 mg Oral Daily Donna Jason MD   240 mg at 05/25/17 0918    enoxaparin injection 40 mg  40 mg Subcutaneous Daily Donna Jason MD   40 mg at 05/24/17 1709    glucagon (human recombinant) injection 1 mg  1 mg Intramuscular PRN Donna Jason MD        glucose chewable tablet 16 g  16 g Oral PRN Donna Jason MD        glucose chewable tablet 24 g  24 g Oral PRN Donna Jason MD        insulin aspart pen 0-5 Units  0-5 Units Subcutaneous QID (AC + HS) PRN Donna Jason MD   2 Units at 05/24/17 2110    insulin aspart pen 16 Units  16 Units Subcutaneous TIDWM Guanaco Story MD        insulin detemir pen 36 Units  36 Units Subcutaneous QHS Guanaco Story MD        sertraline tablet 150 mg  150 mg Oral Daily  Donna Jason MD   150 mg at 05/25/17 0918    valsartan tablet 320 mg  320 mg Oral Daily Donna Jason MD   320 mg at 05/25/17 0923       Review of Systems   Constitutional: Positive for activity change.   Respiratory: Negative for shortness of breath.    Cardiovascular: Negative for chest pain.   Gastrointestinal: Negative for nausea and vomiting.   Skin: Negative for rash and wound.   Neurological: Positive for headaches. Negative for weakness.   Psychiatric/Behavioral: Positive for confusion. Negative for agitation and behavioral problems.     Objective:     Vital Signs (Most Recent):  Temp: 98.5 °F (36.9 °C) (05/25/17 0815)  Pulse: 72 (05/25/17 0815)  Resp: 18 (05/25/17 0815)  BP: 120/67 (05/25/17 0815)  SpO2: 99 % (05/25/17 0815) Vital Signs (24h Range):  Temp:  [98 °F (36.7 °C)-99.3 °F (37.4 °C)] 98.5 °F (36.9 °C)  Pulse:  [66-81] 72  Resp:  [17-18] 18  SpO2:  [96 %-99 %] 99 %  BP: (115-131)/(57-76) 120/67     Weight: 118 kg (260 lb 2.3 oz)  Body mass index is 36.28 kg/m².    Physical Exam   Constitutional: He is oriented to person, place, and time. He appears well-developed and well-nourished. No distress.   HENT:   Head: Normocephalic and atraumatic.   Pulmonary/Chest: Effort normal.   Neurological: He is alert and oriented to person, place, and time.   Skin: Skin is warm and dry. He is not diaphoretic.   Psychiatric: His speech is normal.   Slightly withdrawn.  Less expressive than last admission.  Paucity of speech.   Nursing note and vitals reviewed.      NEUROLOGICAL EXAMINATION:     MENTAL STATUS   Oriented to person, place, and time.   Attention: normal.   Speech: speech is normal   Level of consciousness: alert       Able to answer numerous questions about his son and his pets.  Volunteers minimal information this admission, in contrast to last admission, when he would chitchat no matter what I was doing (LP, raising his leg, etc.)     CRANIAL NERVES     CN II   Visual fields  full to confrontation.     CN V   Facial sensation intact.     CN VII   Facial expression full, symmetric.     CN VIII   Hearing: intact    CN XI   Right sternocleidomastoid strength: normal  Left sternocleidomastoid strength: normal    CN XII   Tongue: not atrophic  Fasciculations: absent  Tongue deviation: none       Mild L JAYDEN.  Improved since last admission.     MOTOR EXAM   Muscle bulk: normal  Overall muscle tone: normal    Strength   Right biceps: 5/5  Left biceps: 5/5  Right triceps: 5/5  Left triceps: 5/5  Right quadriceps: 5/5  Left quadriceps: 5/5    SENSORY EXAM   Light touch normal.       Significant Labs:   Hemoglobin A1c:   Recent Labs  Lab 05/13/17  0138   HGBA1C 11.4*     Blood Culture: No results for input(s): LABBLOO in the last 48 hours.  BMP:   Recent Labs  Lab 05/24/17 0626 05/25/17 0518   * 223*   * 135*   K 5.0 4.0    105   CO2 25 22*   BUN 35* 31*   CREATININE 1.0 0.9   CALCIUM 8.5* 8.1*     CBC:   Recent Labs  Lab 05/24/17 0626 05/25/17 0518   WBC 15.66* 16.82*   HGB 10.1* 8.8*   HCT 29.0* 25.1*    164     CMP:   Recent Labs  Lab 05/24/17 0626 05/25/17 0518   * 223*   * 135*   K 5.0 4.0    105   CO2 25 22*   BUN 35* 31*   CREATININE 1.0 0.9   CALCIUM 8.5* 8.1*   PROT 4.8* 4.6*   ALBUMIN 2.5* 2.3*   BILITOT 0.6 0.5   ALKPHOS 63 67   AST 36 30   ALT 41 42   ANIONGAP 6* 8   EGFRNONAA >60.0 >60.0     Inflammatory Markers:   Recent Labs  Lab 05/25/17  0816   SEDRATE 15*     Urine Culture: No results for input(s): LABURIN in the last 48 hours.  Urine Studies:   Recent Labs  Lab 05/23/17  1716   COLORU Yellow   APPEARANCEUA Clear   PHUR 5.0   SPECGRAV 1.015   PROTEINUA Negative   GLUCUA Negative   KETONESU Negative   BILIRUBINUA Negative   OCCULTUA Negative   NITRITE Negative   UROBILINOGEN Negative   LEUKOCYTESUR Negative     All pertinent lab results from the past 24 hours have been reviewed.    Significant Imaging:    MRI brain  "5/13/17:  Multiple foci of FLAIR hyperintensity in the deep cerebral periventricular white matter in a configuration and distribution which can be seen in the setting of underlying demyelinating process such as multiple sclerosis. There are several punctate foci of restricted diffusion including the left aspect of the midbrain tectum which in light of the aforementioned white matter changes may reflect regions of recent demyelination. Superimposed small acute infarcts would be difficult to exclude, although are felt less likely given the overall constellation of findings. Additionally, there is a 0.7 cm enhancing T2/FLAIR identified in the anterior right thalamus concerning for focus of active demyelination.           Also old lesions in corpus collosum         Assessment and Plan:     Fall    ?seizure    Recommend 24h EEG        Encephalopathy    Acute on chronic.  Patient's wife notes more obvious change over the past few weeks (since initially hospitalized for L JAYDEN), but believes that the changes may have started earlier.  She notes that he started to cut back on work (because he was not able to continue working at full pace, she hypothesizes) several months ago.    May actually be chronic, progressive cognitive dysfunction 2/2 significant WM burden (seen on MRI), exacerbated by steroids -- although it should be noted that the patients "confusion" did start prior to initiation of steroids.      UA, CXR wnl this admission.      F/u lyme titer, WNV, SSA, SSB, dsDNA, vasculitis labs, ESR, CRP, HIV, REYMUNDO, as well as an autoimmune encephalopathy panel    24h EEG to assess for simple partial seizures / underlying etiology of waxing and waning MS        Diplopia    2/2 JAYDEN  Improving        Demyelinating changes in brain    As seen on MRIs from 5/13  S/p 5.5g solumedrol last admission  Discussed with MS team - will need outpatient f/u appointment        Internuclear ophthalmoplegia of left eye    Improving  Received " 5.5g solumedrol last admission  May be 2/2 MS (suspected, but not confirmed)        Depressive disorder, not elsewhere classified    Continue antidepressant.    Patient's wife reports that he has been taking 100mg zoloft, not 150mg, as reported in a pharmacy note(?).    Consider titration/medication adjustment for improved symptom control            VTE Risk Mitigation         Ordered     enoxaparin injection 40 mg  Daily     Route:  Subcutaneous        05/24/17 0554     Place ELIZABETH hose  Until discontinued      05/24/17 0303     Place sequential compression device  Until discontinued      05/24/17 0303     Medium Risk of VTE  Once      05/24/17 0303          Sharon Luong MD  Neurology  [facility]    I have personally taken the history and examined the patient and agree with the resident's note as stated above.  Aldo Mercedes MD, JONATAN, FAAN  Department of Neurology   [facility] [place], [place]

## 2017-05-25 NOTE — PROGRESS NOTES
Hospital Medicine  Progress Note      SUBJECTIVE:     Chief Complaint / Reason for Admission: Syncope    LOS: 2 days  Admit Date: 5/23/2017    Interval history  No acute events overnight. VSS, afebrile. Patient's wife states patient is less talkative.    Scheduled Medications   aspirin  81 mg Oral Daily    atenolol  50 mg Oral Daily    atorvastatin  40 mg Oral Daily    diltiaZEM  240 mg Oral Daily    enoxaparin  40 mg Subcutaneous Daily    insulin aspart  16 Units Subcutaneous TIDWM    insulin detemir  36 Units Subcutaneous QHS    sertraline  150 mg Oral Daily    valsartan  320 mg Oral Daily       Continous Medications       Medications PRN  dextrose 50%, dextrose 50%, glucagon (human recombinant), glucose, glucose, insulin aspart    OBJECTIVE:     Temp:  [98 °F (36.7 °C)-99.3 °F (37.4 °C)]   Pulse:  [66-81]   Resp:  [16-18]   BP: (104-131)/(57-76)   SpO2:  [96 %-99 %]     Vitals:    05/25/17 0815   BP: 120/67   Pulse: 72   Resp: 18   Temp: 98.5 °F (36.9 °C)       I & O (Last 24H):  I/O last 3 completed shifts:  In: 3485 [P.O.:2610; I.V.:875]  Out: 2300 [Urine:2300]    Physical Exam:  Constitutional: He appears well-developed and well-nourished. No distress.   HENT:   Head: Normocephalic and atraumatic.   Mouth/Throat: Oropharynx is clear and moist.   Eyes: EOM are normal.   PERRLA.  No double vision on EOM exam.    Neck: Normal range of motion.   Cardiovascular: Normal rate, regular rhythm and normal heart sounds.    Pulmonary/Chest: Effort normal and breath sounds normal.   Abdominal: Soft. Bowel sounds are normal. He exhibits no distension. There is no tenderness.   Musculoskeletal: Normal range of motion. He exhibits no edema.   Neurological: He is verbal, alert and oriented x4. No cranial nerve deficit. No pronator drift.  No focal neurological deficits.  Psych: Patient's affect seems flat compared to previous admission where he was strongly engaged in conversation with physician.  Able to elicit  smiles but ephemeral.  When asked how he is emotionally, he shrugs and states he's fine.  Denies feelings of sadness or guilt.  Skin: Skin is warm and dry. He is not diaphoretic.     Laboratory:  CBC BMP     Recent Labs  Lab 05/24/17  0626 05/25/17  0518   WBC 15.66* 16.82*   HGB 10.1* 8.8*   HCT 29.0* 25.1*   MCV 85 84    164   RDW 13.3 13.2      Recent Labs  Lab 05/24/17  0626 05/25/17  0518   * 135*   K 5.0 4.0    105   CO2 25 22*   BUN 35* 31*   CREATININE 1.0 0.9   * 223*   CALCIUM 8.5* 8.1*          LFTs    Recent Labs  Lab 05/24/17  0626 05/25/17  0518   PROT 4.8* 4.6*   BILITOT 0.6 0.5   ALKPHOS 63 67   AST 36 30   ALT 41 42      Urine    Recent Labs  Lab 05/23/17  1716   COLORU Yellow   SPECGRAV 1.015   PHUR 5.0   PROTEINUA Negative   NITRITE Negative   LEUKOCYTESUR Negative   UROBILINOGEN Negative        Coag Labs    Recent Labs  Lab 05/23/17  1057   INR 1.0    Mag & Phos  No results for input(s): MG, PHOS in the last 168 hours.     Lactic Acid    Recent Labs  Lab 05/23/17  1058   LACTATE 3.3*       Cardiac Enzyme    Recent Labs  Lab 05/23/17  1057 05/23/17  1058   CPK 20  20  --    TROPONINI  --  0.024   CPKMB 0.5  --    MB 2.5  --        BNP    Recent Labs  Lab 05/23/17  1057   BNP 13       ABG  No results for input(s): PH, PO2, PCO2, HCO3, BE in the last 168 hours.    Thyroid    Recent Labs  Lab 05/24/17  0626   TSH 1.786       Lipase  No results for input(s): LIPASE in the last 168 hours.    Hemoglobin A1C  No results for input(s): HGBA1C in the last 168 hours.    POCT Glucose  Recent Labs      05/24/17   0719  05/24/17   0720  05/24/17   1328  05/24/17   1329  05/24/17   1645  05/24/17   2107   POCTGLUCOSE  310*  362*  369*  392*  281*  321*         Radiology:  Imaging Results    None         Microbiology:  Microbiology Results (last 7 days)     ** No results found for the last 168 hours. **          ASSESSMENT/PLAN:     Active Hospital Problems    Diagnosis  POA     *Syncope [R55]  Yes    Leukocytosis [D72.829]  Yes    Diplopia [H53.2]  Yes    Multiple sclerosis [G35]  Yes    Encephalopathy [G93.40]  Unknown    Fall [W19.XXXA]  Unknown    Type 2 diabetes mellitus with diabetic polyneuropathy, with long-term current use of insulin [E11.42, Z79.4]  Not Applicable    Ataxia [R27.0]  Yes    Demyelinating changes in brain [G37.9]  Yes     By mri.  Several active lesions, many old.  5/13: MRA brain/neck, MRI brain w/wo contrast show no vascular occlusion, multiple foci of hyperintensity in deep cerebral periventricular white matter in pattern of demyelinating process.   5/17: MRI spine performed, no spinal cord lesions.     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  5/17: MRI spine performed, no spinal cord lesions.      NAFLD (nonalcoholic fatty liver disease) [K76.0]  Yes     Chronic    Type 2 diabetes mellitus with hyperglycemia, with long-term current use of insulin [E11.65, Z79.4]  Not Applicable    Obesity [E66.9]  Yes     Chronic    Sleep apnea [G47.30]  Yes     Chronic    Depressive disorder, not elsewhere classified [F32.9]  Yes     Chronic    Essential hypertension [I10]  Yes     Chronic    Mixed hyperlipidemia [E78.2]  Yes     Chronic      Resolved Hospital Problems    Diagnosis Date Resolved POA   No resolved problems to display.       ASSESSMENT: Bessy Nunez is a 57 y.o. male with PMH of    PLAN:  Syncope  Encephalopathy   - Most likely 2/2 dehydration/ hypovolemia in setting of hyperglycemia (especially on steroids). Other ddx includes weakness/miscoordination 2/2 newly diagnosed demyelinating disease, seizure (no history); stroke is less likely given negative head CT, cardiac arrhythmia less likely given normal EKG.   - Received 1L IVF at OSH.  "Will continue IVF, NS 150ml/hr x10hr.   - Fall precautions, seizure precautions.   - PT/OT given weakness.   - Appreciate neurology recs: 24hr EEG, labs for encephalopathy workup  - Defer further steroid use to Neurology.      Type 2 diabetes mellitus with diabetic polyneuropathy, with long-term current use of insulin   - Hyperglycemic in setting of uncontrolled insulin dependent DM and steroid use.   - DM diet.   - Home regimen includes: tresiba 42U qhs, victoza 1.8mg once daily, metformin.   - Last hospitalization was on Detemir 32U qhs, and Aspart 22U TIDWM.   - Will start with Detemir 33U, Aspart 11U TIDWM, and LDSSI. Titrate as needed.   - Dietary consult for polyphagia in setting of uncontrolled DM     Diplopia   - Originally presenting symptom of demyelinating disease.   - Resolved.  - Diplopia following syncope may have been "unmasking" as opposed to acute neurologic problem.      Leukocytosis   - most likely 2/2 steroids, especially given no s/sx of infection.      Essential hypertension   - Cont home: ASA, Atenolol, Diltiazem, Valsartan.       Mixed hyperlipidemia   - Cont home Atorvastatin      Depressive disorder, not elsewhere classified   - cont home zoloft    PT/OT: recommend outpatient PT/OT  Diet: diabetic  DVT PPx: SCD/lovenox  Code Status: Full Code    Dispo:  Pending neuro workup     Patient seen and discussed with Dr. Elvira Hendricks MD during rounds.    Guanaco Story MD  PGY-1  Pager: [phone]    "

## 2017-05-25 NOTE — NURSING
EEG tech notified that patient has removed all electrodes/wired that were placed earlier.[id number]  EEG tech here to replace electrodes

## 2017-05-25 NOTE — PLAN OF CARE
Problem: Patient Care Overview  Goal: Plan of Care Review  Outcome: Ongoing (interventions implemented as appropriate)  Pt free from falls. Pt wears non slip socks when ambulating. Pt bed low and locked position. Pt afebrile. Pt IV site without redness or edema.  Pt has denied any pain or discomfort this shift. Pt BG monitored ACHS.

## 2017-05-26 PROBLEM — G93.40 ENCEPHALOPATHY: Status: ACTIVE | Noted: 2017-05-26

## 2017-05-26 LAB
ALBUMIN SERPL BCP-MCNC: 2.2 G/DL
ALBUMIN SERPL BCP-MCNC: 2.5 G/DL
ALLENS TEST: ABNORMAL
ALP SERPL-CCNC: 91 U/L
ALP SERPL-CCNC: 97 U/L
ALT SERPL W/O P-5'-P-CCNC: 66 U/L
ALT SERPL W/O P-5'-P-CCNC: 67 U/L
ANA SER QL IF: NORMAL
ANION GAP SERPL CALC-SCNC: 6 MMOL/L
ANION GAP SERPL CALC-SCNC: 7 MMOL/L
ANISOCYTOSIS BLD QL SMEAR: SLIGHT
AST SERPL-CCNC: 43 U/L
AST SERPL-CCNC: 55 U/L
B BURGDOR AB SER QL: 0.03 INDEX VALUE
BASOPHILS # BLD AUTO: 0.01 K/UL
BASOPHILS # BLD AUTO: ABNORMAL K/UL
BASOPHILS # BLD AUTO: ABNORMAL K/UL
BASOPHILS NFR BLD: 0 %
BASOPHILS NFR BLD: 0 %
BASOPHILS NFR BLD: 0.1 %
BILIRUB SERPL-MCNC: 0.4 MG/DL
BILIRUB SERPL-MCNC: 0.5 MG/DL
BUN SERPL-MCNC: 23 MG/DL
BUN SERPL-MCNC: 26 MG/DL
CALCIUM SERPL-MCNC: 8 MG/DL
CALCIUM SERPL-MCNC: 8.4 MG/DL
CHLORIDE SERPL-SCNC: 107 MMOL/L
CHLORIDE SERPL-SCNC: 108 MMOL/L
CO2 SERPL-SCNC: 25 MMOL/L
CO2 SERPL-SCNC: 26 MMOL/L
CREAT SERPL-MCNC: 0.9 MG/DL
CREAT SERPL-MCNC: 0.9 MG/DL
DELSYS: ABNORMAL
DIFFERENTIAL METHOD: ABNORMAL
DSDNA AB SER-ACNC: NORMAL [IU]/ML
EOSINOPHIL # BLD AUTO: 0.1 K/UL
EOSINOPHIL # BLD AUTO: ABNORMAL K/UL
EOSINOPHIL # BLD AUTO: ABNORMAL K/UL
EOSINOPHIL NFR BLD: 0 %
EOSINOPHIL NFR BLD: 0 %
EOSINOPHIL NFR BLD: 0.5 %
ERYTHROCYTE [DISTWIDTH] IN BLOOD BY AUTOMATED COUNT: 13.5 %
ERYTHROCYTE [DISTWIDTH] IN BLOOD BY AUTOMATED COUNT: 13.8 %
ERYTHROCYTE [DISTWIDTH] IN BLOOD BY AUTOMATED COUNT: 13.9 %
EST. GFR  (AFRICAN AMERICAN): >60 ML/MIN/1.73 M^2
EST. GFR  (AFRICAN AMERICAN): >60 ML/MIN/1.73 M^2
EST. GFR  (NON AFRICAN AMERICAN): >60 ML/MIN/1.73 M^2
EST. GFR  (NON AFRICAN AMERICAN): >60 ML/MIN/1.73 M^2
GLUCOSE SERPL-MCNC: 264 MG/DL
GLUCOSE SERPL-MCNC: 95 MG/DL
HAV IGM SERPL QL IA: NEGATIVE
HBV CORE IGM SERPL QL IA: NEGATIVE
HBV SURFACE AG SERPL QL IA: NEGATIVE
HCO3 UR-SCNC: 24.1 MMOL/L (ref 24–28)
HCT VFR BLD AUTO: 20.8 %
HCT VFR BLD AUTO: 20.9 %
HCT VFR BLD AUTO: 22.9 %
HCV AB SERPL QL IA: NEGATIVE
HGB BLD-MCNC: 7.2 G/DL
HGB BLD-MCNC: 7.3 G/DL
HGB BLD-MCNC: 8.1 G/DL
LDH SERPL L TO P-CCNC: 1.21 MMOL/L (ref 0.36–1.25)
LYMPHOCYTES # BLD AUTO: 2.8 K/UL
LYMPHOCYTES # BLD AUTO: ABNORMAL K/UL
LYMPHOCYTES # BLD AUTO: ABNORMAL K/UL
LYMPHOCYTES NFR BLD: 19.6 %
LYMPHOCYTES NFR BLD: 24 %
LYMPHOCYTES NFR BLD: 30 %
MAGNESIUM SERPL-MCNC: 1.6 MG/DL
MCH RBC QN AUTO: 29.5 PG
MCH RBC QN AUTO: 29.6 PG
MCH RBC QN AUTO: 30 PG
MCHC RBC AUTO-ENTMCNC: 34.6 %
MCHC RBC AUTO-ENTMCNC: 34.9 %
MCHC RBC AUTO-ENTMCNC: 35.4 %
MCV RBC AUTO: 85 FL
MODE: ABNORMAL
MONOCYTES # BLD AUTO: 0.8 K/UL
MONOCYTES # BLD AUTO: ABNORMAL K/UL
MONOCYTES # BLD AUTO: ABNORMAL K/UL
MONOCYTES NFR BLD: 3 %
MONOCYTES NFR BLD: 5 %
MONOCYTES NFR BLD: 5.9 %
NEUTROPHILS # BLD AUTO: 10.2 K/UL
NEUTROPHILS NFR BLD: 67 %
NEUTROPHILS NFR BLD: 71 %
NEUTROPHILS NFR BLD: 71.9 %
PCO2 BLDA: 38.3 MMHG (ref 35–45)
PH SMN: 7.41 [PH] (ref 7.35–7.45)
PHOSPHATE SERPL-MCNC: 3.9 MG/DL
PLATELET # BLD AUTO: 141 K/UL
PLATELET # BLD AUTO: 153 K/UL
PLATELET # BLD AUTO: 215 K/UL
PLATELET BLD QL SMEAR: ABNORMAL
PLATELET BLD QL SMEAR: ABNORMAL
PMV BLD AUTO: 8.8 FL
PMV BLD AUTO: 8.9 FL
PMV BLD AUTO: 9.1 FL
PO2 BLDA: 70 MMHG (ref 80–100)
POC BE: -1 MMOL/L
POC SATURATED O2: 94 % (ref 95–100)
POC TCO2: 25 MMOL/L (ref 23–27)
POCT GLUCOSE: 103 MG/DL (ref 70–110)
POCT GLUCOSE: 152 MG/DL (ref 70–110)
POCT GLUCOSE: 270 MG/DL (ref 70–110)
POCT GLUCOSE: 274 MG/DL (ref 70–110)
POLYCHROMASIA BLD QL SMEAR: ABNORMAL
POLYCHROMASIA BLD QL SMEAR: ABNORMAL
POTASSIUM SERPL-SCNC: 4 MMOL/L
POTASSIUM SERPL-SCNC: 4.3 MMOL/L
PROT SERPL-MCNC: 4.5 G/DL
PROT SERPL-MCNC: 5.1 G/DL
RBC # BLD AUTO: 2.44 M/UL
RBC # BLD AUTO: 2.47 M/UL
RBC # BLD AUTO: 2.7 M/UL
SAMPLE: ABNORMAL
SITE: ABNORMAL
SODIUM SERPL-SCNC: 139 MMOL/L
SODIUM SERPL-SCNC: 140 MMOL/L
WBC # BLD AUTO: 12.42 K/UL
WBC # BLD AUTO: 14.22 K/UL
WBC # BLD AUTO: 19.09 K/UL
WNV RNA SPEC QL NAA+PROBE: NEGATIVE

## 2017-05-26 PROCEDURE — 99233 SBSQ HOSP IP/OBS HIGH 50: CPT | Mod: ,,, | Performed by: HOSPITALIST

## 2017-05-26 PROCEDURE — 83605 ASSAY OF LACTIC ACID: CPT

## 2017-05-26 PROCEDURE — 95951 HC EEG MONITORING/VIDEO RECORD: CPT

## 2017-05-26 PROCEDURE — 99233 SBSQ HOSP IP/OBS HIGH 50: CPT | Mod: ,,, | Performed by: PSYCHIATRY & NEUROLOGY

## 2017-05-26 PROCEDURE — 99232 SBSQ HOSP IP/OBS MODERATE 35: CPT | Mod: ,,, | Performed by: NURSE PRACTITIONER

## 2017-05-26 PROCEDURE — 82300 ASSAY OF CADMIUM: CPT

## 2017-05-26 PROCEDURE — 63600175 PHARM REV CODE 636 W HCPCS

## 2017-05-26 PROCEDURE — 36415 COLL VENOUS BLD VENIPUNCTURE: CPT

## 2017-05-26 PROCEDURE — 25500020 PHARM REV CODE 255: Performed by: HOSPITALIST

## 2017-05-26 PROCEDURE — 82595 ASSAY OF CRYOGLOBULIN: CPT

## 2017-05-26 PROCEDURE — 36600 WITHDRAWAL OF ARTERIAL BLOOD: CPT

## 2017-05-26 PROCEDURE — 87040 BLOOD CULTURE FOR BACTERIA: CPT

## 2017-05-26 PROCEDURE — 84100 ASSAY OF PHOSPHORUS: CPT

## 2017-05-26 PROCEDURE — 80053 COMPREHEN METABOLIC PANEL: CPT

## 2017-05-26 PROCEDURE — 85045 AUTOMATED RETICULOCYTE COUNT: CPT

## 2017-05-26 PROCEDURE — 63600175 PHARM REV CODE 636 W HCPCS: Performed by: STUDENT IN AN ORGANIZED HEALTH CARE EDUCATION/TRAINING PROGRAM

## 2017-05-26 PROCEDURE — 25000003 PHARM REV CODE 250: Performed by: STUDENT IN AN ORGANIZED HEALTH CARE EDUCATION/TRAINING PROGRAM

## 2017-05-26 PROCEDURE — 63600175 PHARM REV CODE 636 W HCPCS: Performed by: NURSE PRACTITIONER

## 2017-05-26 PROCEDURE — 80074 ACUTE HEPATITIS PANEL: CPT

## 2017-05-26 PROCEDURE — 84165 PROTEIN E-PHORESIS SERUM: CPT

## 2017-05-26 PROCEDURE — 84165 PROTEIN E-PHORESIS SERUM: CPT | Mod: 26,,, | Performed by: PATHOLOGY

## 2017-05-26 PROCEDURE — 95957 EEG DIGITAL ANALYSIS: CPT

## 2017-05-26 PROCEDURE — 85027 COMPLETE CBC AUTOMATED: CPT

## 2017-05-26 PROCEDURE — 63600175 PHARM REV CODE 636 W HCPCS: Performed by: INTERNAL MEDICINE

## 2017-05-26 PROCEDURE — 97166 OT EVAL MOD COMPLEX 45 MIN: CPT

## 2017-05-26 PROCEDURE — 85025 COMPLETE CBC W/AUTO DIFF WBC: CPT

## 2017-05-26 PROCEDURE — 86256 FLUORESCENT ANTIBODY TITER: CPT

## 2017-05-26 PROCEDURE — 82728 ASSAY OF FERRITIN: CPT

## 2017-05-26 PROCEDURE — 20600001 HC STEP DOWN PRIVATE ROOM

## 2017-05-26 PROCEDURE — 83540 ASSAY OF IRON: CPT

## 2017-05-26 PROCEDURE — 85060 BLOOD SMEAR INTERPRETATION: CPT | Mod: ,,, | Performed by: PATHOLOGY

## 2017-05-26 PROCEDURE — 97535 SELF CARE MNGMENT TRAINING: CPT

## 2017-05-26 PROCEDURE — 82550 ASSAY OF CK (CPK): CPT

## 2017-05-26 PROCEDURE — 80053 COMPREHEN METABOLIC PANEL: CPT | Mod: 91

## 2017-05-26 PROCEDURE — 82803 BLOOD GASES ANY COMBINATION: CPT

## 2017-05-26 PROCEDURE — 86255 FLUORESCENT ANTIBODY SCREEN: CPT | Mod: 91

## 2017-05-26 PROCEDURE — 83735 ASSAY OF MAGNESIUM: CPT

## 2017-05-26 PROCEDURE — A9585 GADOBUTROL INJECTION: HCPCS | Performed by: HOSPITALIST

## 2017-05-26 PROCEDURE — 85007 BL SMEAR W/DIFF WBC COUNT: CPT | Mod: 91

## 2017-05-26 RX ORDER — LORAZEPAM 2 MG/ML
INJECTION INTRAMUSCULAR
Status: DISCONTINUED
Start: 2017-05-26 | End: 2017-05-26 | Stop reason: WASHOUT

## 2017-05-26 RX ORDER — MIDAZOLAM HYDROCHLORIDE 1 MG/ML
1 INJECTION INTRAMUSCULAR; INTRAVENOUS ONCE
Status: COMPLETED | OUTPATIENT
Start: 2017-05-26 | End: 2017-05-26

## 2017-05-26 RX ORDER — LORAZEPAM 2 MG/ML
2 INJECTION INTRAMUSCULAR ONCE
Status: COMPLETED | OUTPATIENT
Start: 2017-05-26 | End: 2017-05-26

## 2017-05-26 RX ORDER — LEVETIRACETAM 10 MG/ML
1000 INJECTION INTRAVASCULAR ONCE
Status: COMPLETED | OUTPATIENT
Start: 2017-05-26 | End: 2017-05-26

## 2017-05-26 RX ORDER — LEVETIRACETAM 500 MG/1
1000 TABLET ORAL 2 TIMES DAILY
Status: DISCONTINUED | OUTPATIENT
Start: 2017-05-26 | End: 2017-05-26

## 2017-05-26 RX ORDER — LORAZEPAM 2 MG/ML
INJECTION INTRAMUSCULAR
Status: DISPENSED
Start: 2017-05-26 | End: 2017-05-27

## 2017-05-26 RX ORDER — INSULIN ASPART 100 [IU]/ML
18 INJECTION, SOLUTION INTRAVENOUS; SUBCUTANEOUS
Status: DISCONTINUED | OUTPATIENT
Start: 2017-05-26 | End: 2017-05-31

## 2017-05-26 RX ORDER — GADOBUTROL 604.72 MG/ML
10 INJECTION INTRAVENOUS
Status: COMPLETED | OUTPATIENT
Start: 2017-05-26 | End: 2017-05-26

## 2017-05-26 RX ORDER — LEVETIRACETAM 10 MG/ML
1000 INJECTION INTRAVASCULAR EVERY 12 HOURS
Status: DISCONTINUED | OUTPATIENT
Start: 2017-05-26 | End: 2017-05-28

## 2017-05-26 RX ORDER — LORAZEPAM 2 MG/ML
INJECTION INTRAMUSCULAR
Status: COMPLETED
Start: 2017-05-26 | End: 2017-05-26

## 2017-05-26 RX ADMIN — INSULIN ASPART 16 UNITS: 100 INJECTION, SOLUTION INTRAVENOUS; SUBCUTANEOUS at 08:05

## 2017-05-26 RX ADMIN — VITAMIN D, TAB 1000IU (100/BT) 5000 UNITS: 25 TAB at 08:05

## 2017-05-26 RX ADMIN — VALSARTAN 320 MG: 160 TABLET, FILM COATED ORAL at 08:05

## 2017-05-26 RX ADMIN — SERTRALINE HYDROCHLORIDE 150 MG: 50 TABLET ORAL at 08:05

## 2017-05-26 RX ADMIN — ASPIRIN 81 MG: 81 TABLET, COATED ORAL at 08:05

## 2017-05-26 RX ADMIN — ATENOLOL 50 MG: 25 TABLET ORAL at 08:05

## 2017-05-26 RX ADMIN — GADOBUTROL 10 ML: 604.72 INJECTION INTRAVENOUS at 04:05

## 2017-05-26 RX ADMIN — DILTIAZEM HYDROCHLORIDE 240 MG: 240 CAPSULE, COATED, EXTENDED RELEASE ORAL at 08:05

## 2017-05-26 RX ADMIN — LEVETIRACETAM 1000 MG: 10 INJECTION INTRAVENOUS at 12:05

## 2017-05-26 RX ADMIN — LORAZEPAM 2 MG: 2 INJECTION INTRAMUSCULAR; INTRAVENOUS at 10:05

## 2017-05-26 RX ADMIN — MIDAZOLAM HYDROCHLORIDE 1 MG: 1 INJECTION, SOLUTION INTRAMUSCULAR; INTRAVENOUS at 04:05

## 2017-05-26 RX ADMIN — INSULIN ASPART 2 UNITS: 100 INJECTION, SOLUTION INTRAVENOUS; SUBCUTANEOUS at 08:05

## 2017-05-26 RX ADMIN — INSULIN ASPART 1 UNITS: 100 INJECTION, SOLUTION INTRAVENOUS; SUBCUTANEOUS at 02:05

## 2017-05-26 RX ADMIN — ATORVASTATIN CALCIUM 40 MG: 20 TABLET, FILM COATED ORAL at 08:05

## 2017-05-26 RX ADMIN — LORAZEPAM 2 MG: 2 INJECTION INTRAMUSCULAR at 10:05

## 2017-05-26 RX ADMIN — CEFTRIAXONE 1 G: 1 INJECTION, SOLUTION INTRAVENOUS at 10:05

## 2017-05-26 RX ADMIN — LEVETIRACETAM 1000 MG: 10 INJECTION INTRAVENOUS at 11:05

## 2017-05-26 RX ADMIN — INSULIN ASPART 18 UNITS: 100 INJECTION, SOLUTION INTRAVENOUS; SUBCUTANEOUS at 12:05

## 2017-05-26 NOTE — PROGRESS NOTES
Pt back to TSU room 8067. CT and MRI done with limited pictures due to pt moving.  Mental status unchanged at this time. Critical care NP Kathy Peter and Dr. Hall at bedside to assess pt.  VS stable at this time.  Wife at bedside.  Will continue to monitor pt closely.

## 2017-05-26 NOTE — PROGRESS NOTES
"IM3 paged. MD notified of the following:    - Drop in H&H from 8.8/25.1 to 7.2/20.8 this am.  AST and ALT elevated 55 and 67.  - Pt noted to be confused and unable give definitive answers to questions.  - Pts wife verbalized concern that pt is worse today than when they came to the hospital.    - Pt noted to be unaware of breakfast tray even after reminding him multiple times his food was in front of him.  After asking the pt multiple times if he was hungry and if he was going to eat his breakfast, pt later stated that he didn't know his breakfast was delivered to him although it was sitting next to him.  Pts wife had to feed pt herself.    - Upon Neuro assessments pt noting to have numbness and tingling bilaterally in UE and LE.  When asking pt if there was a difference in feeling between his extremities, pt stated "yes" when asked which extremity pt stated "left".  When pt asked if anything else was bothering him, pt stated "yes".  Pt asked to explain what was bothering him and pt was unable to do so.      No new orders given at this time.  MD stating that team will be rounding shortly.  Will continue to monitor pt.    "

## 2017-05-26 NOTE — SIGNIFICANT EVENT
Critical Care Outreach (CORE)  Critical Care Medicine  Consult Note      CORE Metrics:     Admit Date: 2017  LOS: 3  Code Status: Full Code   CC: Acute Encephalpathy  Date of Consult: 2017  : 1960  Age: 57 y.o.  Weight:   Wt Readings from Last 1 Encounters:   17 118.4 kg (261 lb 0.4 oz)     Race:   Sex: male  Bed: 8067/8067 A:   MRN: [id number]  RRT Indication(s): acute encephalopathy  Time CORE Call Received: 1430  Time CORE at Bedside: 1435  Was the patient discharged from an ICU this admission? no  Was the patient discharged from a PACU within last 24 hours? no  Did the patient receive conscious sedation/general anesthesia within last 24 hours? no  Was the patient in the ED within the past 24 hours? no  Was the patient started on NIPPV within the past 24 hours? no  Did this progress into an ARC or CPA: no  Attending Physician: Elvira Hendricks MD  Primary Service: [facility]OS[facility] MED 3  Illness Category: Neurology  (Medical Cardiac, Neurologic, Surgical Cardiac, Medical Non-Cardiac, Surgical Non-Cardiac, [place], Obstetric, Ambulatory/Outpatient, Trauma, Other i.e. Visitor/Employee)  Consult Requested By: Elivra Hendricks MD   Disposition:     SUBJECTIVE:     History of Present Illness:   Bessy Nunez is a 56 y/o male with history of DM2 on insulin, HTN, HLD,  HFrEF (EF 45% with diastolic dysfunction 2016), CHASE, CVA (16 years ago with previous right hemiparesis which had resolved) who presented after an episode of vision difficulties, dizziness, confusion, and syncopal episode at home with subsequent fall while on his way to the bathroom. He was taken to [facility] ED.  CT Head w/o contrast negative for acute intracranial abnormalities.  Soft tissue injury to posterior left scalp reported.  EKG and troponin wnl.  He was transferred to [facility] to for neurology evaluation of worsening vision.     Leukocytosis (19K) noted on admit. Blood cultures have been negative from 17.  Neurology  consulted. He was connected to EEG due to episodes of urinary incontinence and periods of staring with decreased responsiveness.  CSF studies negative for MS (no oligoclonal bands, normal IgG index).  CSF culture negative. RPR, HIV, acute hepatitis panel, lyme, complement 3 and 4, smooth muscle antibody, ds DNA ab, TSH, thiamine, and vitamin B12 wnl.  CSF cytology negative. Anti-neutrophilic cytoplasmic antibody, paraneoplastic autoantibody, heavy metal screen, WNV PCR,  and cryoglobulin pending.     Of note, he was recently admitted (5/13-5/18) with acute diplopia felt to be secondary to intranuclear ophthalmoplegia of the left eye with MRI Brain concerning for demyelinating disease.  He received pulse dose solumedrol followed by medrol dose pack at discharge.     Rapid Response called for worsening encephalopathy.   Reports of waxing and waning mental status since admission.  Patient lethargic, nonverbal, not following request, moving all extremities.  Vital signs:  's, HR 60s SR, RR 18, spo2 99% on room air.    On continuous EEG with bedside nurse reporting earlier improvement following administration of lorazepam with similar episode around 11 am.      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    Mixed hyperlipidemia 11/9/2012    NAFLD (nonalcoholic fatty liver disease) 10/11/2013    CHASE (nonalcoholic steatohepatitis)     Obesity     Stroke     Type 2 diabetes mellitus with diabetic polyneuropathy, with long-term current use of insulin 5/14/2017      Past Surgical History:   Procedure Laterality Date    SKIN CANCER EXCISION        Review of patient's allergies indicates:  No Known Allergies    Family History   Problem Relation Age of Onset    Heart disease Mother     Hypertension Mother     Heart failure Mother     Cancer Father      lung    Diabetes Maternal Aunt       Social History   Substance Use  Topics    Smoking status: Never Smoker    Smokeless tobacco: Never Used    Alcohol use No         Inpatient Medications:  Continuous Infusions:    Scheduled Meds:   aspirin  81 mg Oral Daily    atenolol  50 mg Oral Daily    atorvastatin  40 mg Oral Daily    diltiaZEM  240 mg Oral Daily    enoxaparin  40 mg Subcutaneous Daily    insulin aspart  18 Units Subcutaneous TIDWM    insulin detemir  40 Units Subcutaneous QHS    levetiracetam  1,000 mg Oral BID    lorazepam        sertraline  150 mg Oral Daily    valsartan  320 mg Oral Daily    vitamin D  5,000 Units Oral Daily      PRN Meds:.dextrose 50%, dextrose 50%, glucagon (human recombinant), glucose, glucose, insulin aspart     Review of Systems:  Unable to obtain given encephalopathy     OBJECTIVE:     Vital Signs (Most Recent):  Temp: 98.4 °F (36.9 °C) (05/26/17 1115)  Pulse: 65 (05/26/17 1430)  Resp: 18 (05/26/17 1406)  BP: 124/62 (05/26/17 1430)  SpO2: 96 % (05/26/17 1430) Vital Signs (24h Range):  Temp:  [97.8 °F (36.6 °C)-98.4 °F (36.9 °C)] 98.4 °F (36.9 °C)  Pulse:  [65-92] 65  Resp:  [18-19] 18  SpO2:  [96 %-98 %] 96 %  BP: (102-127)/(51-62) 124/62     I & O (24h):    Intake/Output Summary (Last 24 hours) at 05/26/17 1651  Last data filed at 05/26/17 0900   Gross per 24 hour   Intake              520 ml   Output              200 ml   Net              320 ml        Physical Exam:  GA: Lethargic, comfortable, no acute distress.   HEENT: Adequate dentition. No visible oropharyngeal abnormalities. No scleral icterus or JVD. No visible thyromegally.   Pulmonary: Chest wall symmetric. No accessory muscle use. No tenderness to palpation. Clear to auscultation  bilaterally. No wheezing, crackles, or rhonchi.  Cardiac: RRR S1 & S2 w/o rubs/murmurs/gallops. No lower extremity edema.   Abdominal: Bowel sounds present.  Soft, nontender.  Skin: No jaundice, rashes, or visible lesions.  Extremities: No clubbing or cyanosis.  Neuro:  --Mental Status:   Lethargic, nonverbal, not following request.  Moving all extremities. No nystagmus or eye deviation.  No facial weakness observed.   --CAM-ICU: positive  --Pupils 3 mm, PERRL.    Lines/Drains/Airway:           Nutrition:  Current Diet Order   Procedures    Diet Diabetic 1800 Calories         Labs:  CBC/Anemia Profile:     Recent Labs  Lab 05/26/17  0556 05/26/17  1138 05/26/17  1502   WBC 12.42 13.28* 19.09*   HGB 7.2* 7.4* 8.1*   HCT 20.8* 21.4* 22.9*   * 121* 215   MCV 85 85 85   RDW 13.5 13.9 13.9   IRON  --  66  --    FERRITIN  --  284  --    RETIC  --  3.5*  --         Coags:     Recent Labs  Lab 05/23/17  1057   INR 1.0   APTT <21.0        Chemistries:     Recent Labs  Lab 05/25/17  0518 05/26/17  0556 05/26/17  1502   * 139 140   K 4.0 4.3 4.0    107 108   CO2 22* 26 25   BUN 31* 26* 23*   CREATININE 0.9 0.9 0.9   CALCIUM 8.1* 8.0* 8.4*   PROT 4.6* 4.5* 5.1*   BILITOT 0.5 0.4 0.5   ALKPHOS 67 91 97   ALT 42 67* 66*   AST 30 55* 43*   MG  --   --  1.6   PHOS  --   --  3.9        Urine Studies:   Lab Results   Component Value Date    COLORU Yellow 05/23/2017    APPEARANCEUA Clear 05/23/2017    PHUR 5.0 05/23/2017    SPECGRAV 1.015 05/23/2017    PROTEINUA Negative 05/23/2017    GLUCUA Negative 05/23/2017    KETONESU Negative 05/23/2017    BILIRUBINUA Negative 05/23/2017    OCCULTUA Negative 05/23/2017    NITRITE Negative 05/23/2017    UROBILINOGEN Negative 05/23/2017    LEUKOCYTESUR Negative 05/23/2017    RBCUA 0 04/12/2016    WBCUA 1 04/12/2016    BACTERIA Occasional 04/12/2016    SQUAMEPITHEL 1 04/12/2016    HYALINECASTS 1 04/12/2016        Lactic Acid:   Lab Results   Component Value Date    LACTATE 3.3 (H) 05/23/2017        Cardiac Enzymes:   Recent Labs      05/26/17   1138   CPK  18*        Arterial Blood Gas:   No results for input(s): PH, PCO2, HCO3, POCSATURATED, BE in the last 24 hours.    Invalid input(s):  PO2     Microbiology Results (last 7 days)     Procedure Component Value  Units Date/Time    Blood culture [[id number]]     Order Status:  Sent Specimen:  Blood     Blood culture [[id number]]     Order Status:  Sent Specimen:  Blood           Imaging:  Reviewed      Diagnostics/Interventions by CORE:     1. MRI Brain w/wo contrast  2. CT Head w/o contrast  3. CBC, CMP  4. ABG with lactate    ASSESSMENT/PLAN:     Active Hospital Problems    Diagnosis    *Encephalopathy    Leukocytosis    Diplopia    Multiple sclerosis    Fall    Syncope    Type 2 diabetes mellitus with diabetic polyneuropathy, with long-term current use of insulin    Ataxia    Demyelinating changes in brain     By mri.  Several active lesions, many old.  5/13: MRA brain/neck, MRI brain w/wo contrast show no vascular occlusion, multiple foci of hyperintensity in deep cerebral periventricular white matter in pattern of demyelinating process.   5/17: MRI spine performed, no spinal cord lesions.      Internuclear ophthalmoplegia of left eye     5/13: MRA brain/neck, MRI brain w/wo contrast show no vascular occlusion, multiple foci of hyperintensity in deep cerebral periventricular white matter in pattern of demyelinating process.  5/17: MRI spine performed, no spinal cord lesions.      NAFLD (nonalcoholic fatty liver disease)    Type 2 diabetes mellitus with hyperglycemia, with long-term current use of insulin    Obesity    Sleep apnea    Depressive disorder, not elsewhere classified    Essential hypertension    Mixed hyperlipidemia          Acute Encephalopathy  --discussed with neurology and primary team, Dr. Duran, at bedside. White matter disease noted on MRI Brain 5/13/17, which was initially felt to be multiple sclerosis. MRA Brain and MRI spine negative at that time.  Previously received steriods during admission and steroid taper at discharge with subsequent worsening in vision and mental status.   -- EEG negative for seizures per Neurology. He was given lorazepam 2mg and keppra 1g earlier  today, which could contribute to decreased mental status.  --ABG 7.40/38/70/24/94% on room air.  Lactate 1.21.  He is protecting his airway without signs of distress.  --MRI Brain with and without contrast limited study given movement despite 2 mg versed.  Imaging concerning for acute infarction involving the right frontal and right temporal lobes as well as left cerebellar and left brainstem.  CT Head w/o contrast with no detrimental change, volume loss and multiple areas of hyperattenuation consistent with infarcts and periventricular hypoattenuation likely related to demyelinating disease.   --recommend repeating blood cultures given rising leukocytosis, currently afebrile  --consider checking ammonia .   --consider holding sertraline as this could potentially worsen mental status  --Primary team consulting vascular neurology given concern for acute strokes and neurocritical care given waxing mental status.       After imaging, I discussed with primary team, Dr. Hall, and Neurology, Dr. Mercedes.  Findings from imaging were discussed with patient's wife by Dr. Mercedes.  Mental status somewhat improved as Mr. Nunez is more alert,  oriented to person, and inconsistently verbalizing and following request. Awaiting vascular neurology and neurocritical are inputs. No acute distress noted at this time.  Telemetry ordered.     Discussed with Dr. Metzger, Critical Care Fellow and Dr. Garner, Critical Care Attending.     Please call for any questions, concerns, or changes in mental status.    I spent >70 minutes reviewing patient records, examining, and counseling the patient with greater than 50% of the time spent with direct patient care and coordination.     LEVI RIOS, ACNP-BC  Critical Care Medicine  [phone]

## 2017-05-26 NOTE — PROGRESS NOTES
Charge RN Ebonie Moore and Rochelle called to bedside to assess pt.  Pt oriented to person and place with delayed response, unable to answer any other questions.  Pt understanding of questions unclear however, pt able to follow commands at this time.  No weakness noted, equal  and leg strength.  Pupils equal and reactive.  No facial asymmetry.  Primary team again called to assess pt.      10:50pm - Neurology and Primary team at bedside to assess pt. Pt noted to be able to follow verbal and written directions.  Pts understanding noted.  Pt asked to write something on a piece of paper which he was able to do.  Expressive dysphagia noted. Orders given to push 2 mg IV ativan then follow it up with IV keppra.      1 mg IV ativan pushed as ordered.  IV noted to be leaking.  Pt noted to have positive response from ativan, pt able to answer orientation questions.  New IV started. Posturing to left hand/wrist noted.  1 mg IV ativan pushed.  Posturing resolved.  Pt able to answer orientation questions.

## 2017-05-26 NOTE — SUBJECTIVE & OBJECTIVE
Subjective:     Interval History: Wife reports additional episodes of staring with associated urinary incontinence OVN.  Hooked up to EEG OVN.    Called back to the room later this morning for an episode of staring and decreased responsiveness.  On exam, patient was able to follow verbal and written directions, but was not speaking.  He could write.  1mg Ativan was given, with resolution of symptoms.  cEEG in place.    Current Neurological Medications: sertraline    Current Facility-Administered Medications   Medication Dose Route Frequency Provider Last Rate Last Dose    aspirin EC tablet 81 mg  81 mg Oral Daily Donna Jason MD   81 mg at 05/26/17 0830    atenolol tablet 50 mg  50 mg Oral Daily Donna Jason MD   50 mg at 05/26/17 0829    atorvastatin tablet 40 mg  40 mg Oral Daily Donna Jason MD   40 mg at 05/26/17 0828    dextrose 50% injection 12.5 g  12.5 g Intravenous PRN Donna Jason MD        dextrose 50% injection 25 g  25 g Intravenous PRN Donna Jason MD        diltiaZEM 24 hr capsule 240 mg  240 mg Oral Daily Donna Jason MD   240 mg at 05/26/17 0828    enoxaparin injection 40 mg  40 mg Subcutaneous Daily Donna Jason MD   40 mg at 05/25/17 1648    glucagon (human recombinant) injection 1 mg  1 mg Intramuscular PRN Donna Jason MD        glucose chewable tablet 16 g  16 g Oral PRN Donna Jason MD        glucose chewable tablet 24 g  24 g Oral PRN Donna Jason MD        insulin aspart pen 0-5 Units  0-5 Units Subcutaneous QID (AC + HS) PRN Donna Jason MD   2 Units at 05/26/17 0839    insulin aspart pen 18 Units  18 Units Subcutaneous TIDWM Josefina Urena MD   18 Units at 05/26/17 1232    insulin detemir pen 40 Units  40 Units Subcutaneous QHS Josefina Urena MD        levetiracetam tablet 1,000 mg  1,000 mg Oral BID Sharon V.  MD Cherise        lorazepam (ATIVAN) 2 mg/mL injection             sertraline tablet 150 mg  150 mg Oral Daily Donna Jason MD   150 mg at 05/26/17 0829    valsartan tablet 320 mg  320 mg Oral Daily Donna Jason MD   320 mg at 05/26/17 0837    vitamin D 1000 units tablet 5,000 Units  5,000 Units Oral Daily Guanaco Story MD   5,000 Units at 05/26/17 0846       Review of Systems   Constitutional: Positive for activity change.   Respiratory: Negative for shortness of breath.    Cardiovascular: Negative for chest pain.   Gastrointestinal: Negative for nausea and vomiting.   Skin: Negative for rash and wound.   Neurological: Positive for headaches. Negative for weakness.   Psychiatric/Behavioral: Positive for decreased concentration. Negative for agitation and behavioral problems.     Objective:     Vital Signs (Most Recent):  Temp: 98.4 °F (36.9 °C) (05/26/17 1115)  Pulse: 65 (05/26/17 1430)  Resp: 18 (05/26/17 1406)  BP: 124/62 (05/26/17 1430)  SpO2: 96 % (05/26/17 1430) Vital Signs (24h Range):  Temp:  [97.8 °F (36.6 °C)-99.1 °F (37.3 °C)] 98.4 °F (36.9 °C)  Pulse:  [65-92] 65  Resp:  [18-19] 18  SpO2:  [96 %-98 %] 96 %  BP: (102-130)/(51-66) 124/62     Weight: 118.4 kg (261 lb 0.4 oz)  Body mass index is 36.41 kg/m².    Physical Exam   Constitutional: He is oriented to person, place, and time. He appears well-developed and well-nourished. No distress.   HENT:   Head: Normocephalic and atraumatic.   Pulmonary/Chest: Effort normal.   Neurological: He is alert and oriented to person, place, and time. He has a normal Finger-Nose-Finger Test.   Skin: Skin is warm and dry. He is not diaphoretic.   Psychiatric: His speech is normal.   Slightly withdrawn.  Less expressive than last admission.  Paucity of speech.   Nursing note and vitals reviewed.      NEUROLOGICAL EXAMINATION:     MENTAL STATUS   Oriented to person, place, and time.   Attention: normal.   Speech: speech is normal    Level of consciousness: alert    CRANIAL NERVES     CN II   Visual fields full to confrontation.     CN V   Facial sensation intact.     CN VII   Facial expression full, symmetric.     CN VIII   Hearing: intact    CN XI   Right sternocleidomastoid strength: normal  Left sternocleidomastoid strength: normal    CN XII   Tongue: not atrophic  Fasciculations: absent  Tongue deviation: none       Mild L JAYDEN.  Improving.     MOTOR EXAM   Muscle bulk: normal  Overall muscle tone: normal    Strength   Right biceps: 5/5  Left biceps: 5/5  Right triceps: 5/5  Left triceps: 5/5  Right quadriceps: 5/5  Left quadriceps: 5/5    SENSORY EXAM   Light touch normal.     GAIT AND COORDINATION      Coordination   Finger to nose coordination: normal      Significant Labs:   Hemoglobin A1c:     Recent Labs  Lab 05/13/17  0138   HGBA1C 11.4*     Blood Culture: No results for input(s): LABBLOO in the last 48 hours.  BMP:     Recent Labs  Lab 05/25/17 0518 05/26/17  0556   * 264*   * 139   K 4.0 4.3    107   CO2 22* 26   BUN 31* 26*   CREATININE 0.9 0.9   CALCIUM 8.1* 8.0*     CBC:     Recent Labs  Lab 05/25/17 0518 05/26/17  0556 05/26/17  1138   WBC 16.82* 12.42 13.28*   HGB 8.8* 7.2* 7.4*   HCT 25.1* 20.8* 21.4*    141* 121*     CMP:     Recent Labs  Lab 05/25/17 0518 05/26/17  0556   * 264*   * 139   K 4.0 4.3    107   CO2 22* 26   BUN 31* 26*   CREATININE 0.9 0.9   CALCIUM 8.1* 8.0*   PROT 4.6* 4.5*   ALBUMIN 2.3* 2.2*   BILITOT 0.5 0.4   ALKPHOS 67 91   AST 30 55*   ALT 42 67*   ANIONGAP 8 6*   EGFRNONAA >60.0 >60.0     Inflammatory Markers:     Recent Labs  Lab 05/25/17  0816   SEDRATE 15*     Urine Culture: No results for input(s): LABURIN in the last 48 hours.  Urine Studies: No results for input(s): COLORU, APPEARANCEUA, PHUR, SPECGRAV, PROTEINUA, GLUCUA, KETONESU, BILIRUBINUA, OCCULTUA, NITRITE, UROBILINOGEN, LEUKOCYTESUR, RBCUA, WBCUA, BACTERIA, SQUAMEPITHEL, HYALINECASTS in the  last 48 hours.    Invalid input(s): GUS  All pertinent lab results from the past 24 hours have been reviewed.    Significant Imaging:    MRI brain 5/13/17:  Multiple foci of FLAIR hyperintensity in the deep cerebral periventricular white matter in a configuration and distribution which can be seen in the setting of underlying demyelinating process such as multiple sclerosis. There are several punctate foci of restricted diffusion including the left aspect of the midbrain tectum which in light of the aforementioned white matter changes may reflect regions of recent demyelination. Superimposed small acute infarcts would be difficult to exclude, although are felt less likely given the overall constellation of findings. Additionally, there is a 0.7 cm enhancing T2/FLAIR identified in the anterior right thalamus concerning for focus of active demyelination.           Also old lesions in corpus collosum

## 2017-05-26 NOTE — PROGRESS NOTES
Charge nurse Ebonie Moore called to the bedside to assess pt.  Pt still unable to answer or respond to any questions.  Unable to follow commands at this time. Posturing still noted. Pupil to left side sluggish.    Rapid response called.  Will continue to monitor.

## 2017-05-26 NOTE — CONSULTS
[facility]  Vascular Neurology  Comprehensive Stroke Center  Consult Note      Inpatient consult to Vascular (Stroke) Neurology  Consult performed by: ANA COHEN  Consult ordered by: EFFIE PRITCHETT        Assessment/Plan:     Patient is a 57 y.o. year old male with:    * Encephalopathy    Patient with intermittent aphasia, inattention, and delayed response    Area in R frontal lobe concerning for possible new infarct. Patient has hyperdensity in DWI and correlating hypodensity in ADC. However this new lesion may reflect a new demyelinating lesion. Depending on stage of a demyelinating lesion, it could appear as an acute stroke on MRI. At this point, it is difficult to ascertain the cause of the R frontal lesion. Therefore, would treat as an acute stroke until proven otherwise.    Recommendations:  Repeat MRI brain in 2-3 days  Repeat LP  Repeat MRI spine to look for spinal lesions  Continue aspirin 81 and atorvastatin 40  SBP <220  Sq heparin for DVT prophylaxis  PT/OT and speech to evaluate and treat  Neuro checks q2-4h              Type 2 diabetes mellitus with hyperglycemia, with long-term current use of insulin    Stroke risk factor  A1C 11.4 on 5/13/17  Continue aspart and detemir   on medication compliance prior to discharge        Essential hypertension    Stroke risk factor  Allow permissive hypertension in setting of possible acute stroke  SBP <220          Mixed hyperlipidemia    Stroke risk factor  Continue atorvastatin 40            Thrombolysis Candidate? No  1. Contraindications: Outside of treament window    Interventional Revascularization Candidate?  No; No significant neurological deficit    Research Candidate? 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    Mixed hyperlipidemia 11/9/2012     "NAFLD (nonalcoholic fatty liver disease) 10/11/2013    CHASE (nonalcoholic steatohepatitis)     Obesity     Stroke     Type 2 diabetes mellitus with diabetic polyneuropathy, with long-term current use of insulin 5/14/2017     Past Surgical History:   Procedure Laterality Date    SKIN CANCER EXCISION       Family History   Problem Relation Age of Onset    Heart disease Mother     Hypertension Mother     Heart failure Mother     Cancer Father      lung    Diabetes Maternal Aunt      Social History   Substance Use Topics    Smoking status: Never Smoker    Smokeless tobacco: Never Used    Alcohol use No     Review of patient's allergies indicates:  No Known Allergies  Medications: I have reviewed the current medication administration record.    Prescriptions Prior to Admission   Medication Sig Dispense Refill Last Dose    aspirin (ECOTRIN) 81 MG EC tablet Take 81 mg by mouth once daily.   5/13/2017    atenolol (TENORMIN) 50 MG tablet Take 1 tablet (50 mg total) by mouth once daily. 90 tablet 3 5/13/2017    atorvastatin (LIPITOR) 40 MG tablet Take 1 tablet (40 mg total) by mouth once daily. 90 tablet 3 5/13/2017    diltiaZEM (DILACOR XR) 240 MG CDCR Take 1 capsule (240 mg total) by mouth once daily. 90 capsule 3 5/13/2017    insulin degludec (TRESIBA FLEXTOUCH U-200) 200 unit/mL (3 mL) InPn Inject 42 Units into the skin every evening. 3 Syringe 1 5/12/2017 at Unknown time    insulin needles, disposable, (BD ULTRA-FINE ANA PEN NEEDLES) 32 x 5/32 " Ndle Inject twice daily 100 each 3 Past Week at Unknown time    liraglutide 0.6 mg/0.1 mL, 18 mg/3 mL, subq PNIJ (VICTOZA 3-TOMASZ) 0.6 mg/0.1 mL (18 mg/3 mL) PnIj Inject 1.8 mg into the skin once daily. 9 mL 11 5/13/2017    metformin (GLUCOPHAGE-XR) 500 MG 24 hr tablet Take 2 tablets (1,000 mg total) by mouth 2 (two) times daily with meals. 360 tablet 0 5/13/2017    methylPREDNISolone (MEDROL DOSEPACK) 4 mg tablet Use as directed on dose-pack -Pharmacy please " specify directions for patient 1 Package 0     omega-3 fatty acids-vitamin E (FISH OIL) 1,000 mg Cap Take 1 capsule by mouth 2 (two) times daily.  0 5/13/2017    sertraline (ZOLOFT) 100 MG tablet 1 and half tablet daily (Patient taking differently: Take 150 mg by mouth once daily. ) 135 tablet 3 5/13/2017    valsartan (DIOVAN) 320 MG tablet Take 1 tablet (320 mg total) by mouth once daily. 90 tablet 3 5/13/2017    vitamin D 1000 units Tab Take 5 tablets (5,000 Units total) by mouth once daily.          Review of Systems   Constitutional: Negative for chills and fever.   Eyes: Positive for visual disturbance. Negative for redness.   Respiratory: Negative for cough and shortness of breath.    Cardiovascular: Negative for chest pain and palpitations.   Gastrointestinal: Negative for nausea and vomiting.   Skin: Negative for rash.   Neurological: Positive for speech difficulty. Negative for dizziness, seizures, weakness, numbness and headaches.   Psychiatric/Behavioral: Positive for confusion.     Objective:     Vital Signs (Most Recent):  Temp: 98.4 °F (36.9 °C) (05/26/17 1115)  Pulse: 65 (05/26/17 1430)  Resp: 18 (05/26/17 1430)  BP: 124/62 (05/26/17 1430)  SpO2: 96 % (05/26/17 1430)    Vital Signs Range (Last 24H):  Temp:  [97.8 °F (36.6 °C)-98.4 °F (36.9 °C)]   Pulse:  [65-92]   Resp:  [18-19]   BP: (102-127)/(51-62)   SpO2:  [96 %-98 %]     Physical Exam   Constitutional: He appears well-developed and well-nourished. No distress.   HENT:   Head: Normocephalic and atraumatic.   Eyes: EOM are normal. Pupils are equal, round, and reactive to light.   Cardiovascular: Normal rate.    Pulmonary/Chest: Effort normal. No respiratory distress.   Abdominal: Soft. He exhibits no distension.   Musculoskeletal: Normal range of motion.   Neurological: He is alert.   Skin: Skin is warm and dry.   Psychiatric: His affect is blunt. He is slowed. He is noncommunicative. He is inattentive.   Vitals reviewed.      Neurological  Exam:   LOC: alert and follows requests  Language: No aphasia  Speech: No dysarthria  Orientation: Person, Place, Time  Memory: Recent memory intact, Remote memory intact, Age correct, Month correct  Visual Fields (CN II): Full  EOM (CN III, IV, VI): Full/intact  Pupils (CN III, IV, VI): PERRL  Facial Sensation (CN V): Symmetric  Facial Movement (CN VII): symmetric facial expression  Hearing (CN VIII): intact bilaterally  Motor*: Arm Left:  Normal (5/5), Leg Left:   Normal (5/5), Arm Right:   Normal (5/5), Leg Right:   Normal (5/5)  Cerebellar*: Normal limb, Normal gait  , Normal stance  Sensation: intact to light touch, temperature and vibration  Tone: Arm-Left: normal; Leg-Left: normal; Arm-Right: normal; Leg-Right: normal    NIH Stroke Scale:  Interval: baseline (upon arrival/admit)  Level of Consciousness: 0 - alert  LOC Questions: 2 - answers none correctly  LOC Commands: 0 - performs both correctly  Best Gaze: 1 - partial gaze palsy  Visual: 0 - no visual loss  Facial Palsy: 0 - normal  Motor Left Arm: 0 - no drift  Motor Right Arm: 0 - no drift  Motor Left Le - no drift  Motor Right Le - no drift  Limb Ataxia: 0 - absent  Sensory: 0 - normal  Best Language: 2 - severe aphasia  Dysarthria: 2 - near to unintelligible  Extinction and Inattention: 0 - no neglect  NIH Stroke Scale Total: 7      Laboratory:  CMP:   Recent Labs  Lab 17  1502   CALCIUM 8.4*   ALBUMIN 2.5*   PROT 5.1*      K 4.0   CO2 25      BUN 23*   CREATININE 0.9   ALKPHOS 97   ALT 66*   AST 43*   BILITOT 0.5     CBC:   Recent Labs  Lab 17  1502   WBC 19.09*   RBC 2.70*   HGB 8.1*   HCT 22.9*      MCV 85   MCH 30.0   MCHC 35.4     Lipid Panel: No results for input(s): CHOL, LDLCALC, HDL, TRIG in the last 168 hours.  Coagulation:   Recent Labs  Lab 17  1057   INR 1.0   APTT <21.0     Platelet Aggregation Study: No results for input(s): PLTAGG, PLTAGINTERP, PLTAGREGLACO, ADPPLTAGGREG in the last 168  hours.  Hgb A1C: No results for input(s): HGBA1C in the last 168 hours.  TSH:   Recent Labs  Lab 05/24/17  0626   TSH 1.786       Diagnostic Results:  Brain Imaging:       MRI Head. Date: 05/26/17  Please note evaluation is markedly limited by motion artifact, and the patient could not complete the exam.  However, the limited diffusion MRI findings are most compatible with acute infarction involving the right frontal and right temporal lobes as well as the left cerebellar hemisphere and left brainstem, as above.  No evidence of intracranial hemorrhage.    Echo. Date: 05/13/17  -EF 65%  -mild LAE        Nancy Reyes PA-C  Comprehensive Stroke Center  Department of Vascular Neurology   [facility]

## 2017-05-26 NOTE — CONSULTS
[facility]  Adult Nutrition  Consult Note    SUMMARY     Recommendations    Recommendation/Intervention: Educated pt on Diabetic diet, pt will need reinforcement.  Pt consuming 100% of meals. Modify ADA 1800 to ADA 2200 to better meet pt estimated needs.  RD following.     Goals: Pt to adhere to ADA diet  Nutrition Goal Status: new  Communication of RD Recs: reviewed with RN      Reason for Assessment    Reason for Assessment: physician consult  Diagnosis: diabetes diagnosis/complications (syncope, DM2, NAFLD, MS)  General Information Comments: Pt receiving cords to his head while I educated pt on ADA diet.  Pt states his BS are normally in 300s at home.  Pt has been educated on diabetic diet previously, but pt has a knowledge deficit.  Reviewed, and asked pt to teach back.  Pt needs reinforcement, pt attention was in and out during ed, pt had delayed response times.    Nutrition Discharge Planning: Will assess daily, pt to follow ADA diet.    Nutrition Prescription Ordered    Current Diet Order: Diabetic 1800       Nutrition/Diet History    Patient Reported Diet/Restrictions/Preferences: general  Typical Food/Fluid Intake: Pt states he has a wonderful appetite, consuming 100% of meals.   Food Preferences: No cultural/Spiritism needs reported.        Factors Affecting Nutritional Intake:  (none at this time)       Labs/Tests/Procedures/Meds       Pertinent Labs Reviewed: reviewed  Pertinent Labs Comments: Na 135, BUN 31, glu 223, Ca 8.1, CRP 5.37, A1c 11.4 (5/13/17)  Pertinent Medications Reviewed: reviewed  Pertinent Medications Comments: statin, insulin, Vit D    Physical Findings    Overall Physical Appearance: obese     Oral/Mouth Cavity: tooth/teeth missing       Anthropometrics       Height (inches): 70.98 in  Weight Method: Bed Scale  Weight (kg): 118.4 kg  Ideal Body Weight (IBW), Male: 171.88 lb     % Ideal Body Weight, Male (lb): 151.87 lb     BMI (kg/m2): 36.42  BMI Grade: 35 - 39.9  - obesity - grade II          Estimated/Assessed Needs    Weight Used For Calorie Calculations: 118.4 kg (261 lb 0.4 oz)   Height (cm): 180.3 cm     Energy Need Method: Hobson-St Jeor (2226 kcal/day (1.1 PAL))  RMR (Hobson-St. Jeor Equation): 2024.41        Weight Used For Protein Calculations: 77.7 kg (171 lb 4.8 oz) (IBW)  Protein Requirements: 77-93 g/day  Fluid Need Method: RDA Method (or per MD)        Nutrition diagnosis:    Nutrition knowledge deficit r/t uncontrolled diabetes AEB A1c 11.4, pt consuming general diet.    Monitor and Evaluation    Food and Nutrient Intake: food and beverage intake, energy intake  Food and Nutrient Adminstration: diet order  Knowledge/Beliefs/Attitudes: food and nutrition knowledge/skill  Physical Activity and Function: nutrition-related ADLs and IADLs  Anthropometric Measurements: weight change, weight, body mass index  Biochemical Data, Medical Tests and Procedures: electrolyte and renal panel, gastrointestinal profile, glucose/endocrine profile  Nutrition-Focused Physical Findings: overall appearance  % Intake of Estimated Energy Needs: 75 - 100 %  % Meal Intake: 100%    Nutrition Risk    Level of Risk:  (1 x per week)    Nutrition Follow-Up    RD Follow-up?: Yes

## 2017-05-26 NOTE — PROGRESS NOTES
[facility]  Neurology  Progress Note    Patient Name: Bessy Nunez  MRN: [id number]  Admission Date: 5/23/2017  Hospital Length of Stay: 3 days  Code Status: Full Code   Attending Provider: Elvira Hendricks MD  Primary Care Physician: Edgar Nova MD   Principal Problem:Encephalopathy      Subjective:     Interval History: Wife reports additional episodes of staring with associated urinary incontinence OVN.  Hooked up to EEG OVN.    Called back to the room later this morning for an episode of staring and decreased responsiveness.  On exam, patient was able to follow verbal and written directions, but was not speaking.  He could write.  1mg Ativan was given, with resolution of symptoms.  cEEG in place.    Current Neurological Medications: sertraline    Current Facility-Administered Medications   Medication Dose Route Frequency Provider Last Rate Last Dose    aspirin EC tablet 81 mg  81 mg Oral Daily Donna Jason MD   81 mg at 05/26/17 0830    atenolol tablet 50 mg  50 mg Oral Daily Donna Jason MD   50 mg at 05/26/17 0829    atorvastatin tablet 40 mg  40 mg Oral Daily Donna Jason MD   40 mg at 05/26/17 0828    dextrose 50% injection 12.5 g  12.5 g Intravenous PRN Donna Jason MD        dextrose 50% injection 25 g  25 g Intravenous PRN Donna Jason MD        diltiaZEM 24 hr capsule 240 mg  240 mg Oral Daily Donna Jason MD   240 mg at 05/26/17 0828    enoxaparin injection 40 mg  40 mg Subcutaneous Daily Donna Jason MD   40 mg at 05/25/17 1648    glucagon (human recombinant) injection 1 mg  1 mg Intramuscular PRN Donna Jason MD        glucose chewable tablet 16 g  16 g Oral PRN Donna Jason MD        glucose chewable tablet 24 g  24 g Oral PRN Donna Jason MD        insulin aspart pen 0-5 Units  0-5 Units Subcutaneous QID (AC + HS) PRN Donna Conn  MD Eren   2 Units at 05/26/17 0839    insulin aspart pen 18 Units  18 Units Subcutaneous TIDWM Josefina Urena MD   18 Units at 05/26/17 1232    insulin detemir pen 40 Units  40 Units Subcutaneous QHS Josefina Urena MD        levetiracetam tablet 1,000 mg  1,000 mg Oral BID Sharon Luong MD        lorazepam (ATIVAN) 2 mg/mL injection             sertraline tablet 150 mg  150 mg Oral Daily Donna Jason MD   150 mg at 05/26/17 0829    valsartan tablet 320 mg  320 mg Oral Daily Donna Jason MD   320 mg at 05/26/17 0837    vitamin D 1000 units tablet 5,000 Units  5,000 Units Oral Daily Guanaco Story MD   5,000 Units at 05/26/17 0846       Review of Systems   Constitutional: Positive for activity change.   Respiratory: Negative for shortness of breath.    Cardiovascular: Negative for chest pain.   Gastrointestinal: Negative for nausea and vomiting.   Skin: Negative for rash and wound.   Neurological: Positive for headaches. Negative for weakness.   Psychiatric/Behavioral: Positive for decreased concentration. Negative for agitation and behavioral problems.     Objective:     Vital Signs (Most Recent):  Temp: 98.4 °F (36.9 °C) (05/26/17 1115)  Pulse: 65 (05/26/17 1430)  Resp: 18 (05/26/17 1406)  BP: 124/62 (05/26/17 1430)  SpO2: 96 % (05/26/17 1430) Vital Signs (24h Range):  Temp:  [97.8 °F (36.6 °C)-99.1 °F (37.3 °C)] 98.4 °F (36.9 °C)  Pulse:  [65-92] 65  Resp:  [18-19] 18  SpO2:  [96 %-98 %] 96 %  BP: (102-130)/(51-66) 124/62     Weight: 118.4 kg (261 lb 0.4 oz)  Body mass index is 36.41 kg/m².    Physical Exam   Constitutional: He is oriented to person, place, and time. He appears well-developed and well-nourished. No distress.   HENT:   Head: Normocephalic and atraumatic.   Pulmonary/Chest: Effort normal.   Neurological: He is alert and oriented to person, place, and time. He has a normal Finger-Nose-Finger Test.   Skin: Skin is warm and dry. He  is not diaphoretic.   Psychiatric: His speech is normal.   Slightly withdrawn.  Less expressive than last admission.  Paucity of speech.   Nursing note and vitals reviewed.      NEUROLOGICAL EXAMINATION:     MENTAL STATUS   Oriented to person, place, and time.   Attention: normal.   Speech: speech is normal   Level of consciousness: alert    CRANIAL NERVES     CN II   Visual fields full to confrontation.     CN V   Facial sensation intact.     CN VII   Facial expression full, symmetric.     CN VIII   Hearing: intact    CN XI   Right sternocleidomastoid strength: normal  Left sternocleidomastoid strength: normal    CN XII   Tongue: not atrophic  Fasciculations: absent  Tongue deviation: none       Mild L JAYDEN.  Improving.     MOTOR EXAM   Muscle bulk: normal  Overall muscle tone: normal    Strength   Right biceps: 5/5  Left biceps: 5/5  Right triceps: 5/5  Left triceps: 5/5  Right quadriceps: 5/5  Left quadriceps: 5/5    SENSORY EXAM   Light touch normal.     GAIT AND COORDINATION      Coordination   Finger to nose coordination: normal      Significant Labs:   Hemoglobin A1c:     Recent Labs  Lab 05/13/17  0138   HGBA1C 11.4*     Blood Culture: No results for input(s): LABBLOO in the last 48 hours.  BMP:     Recent Labs  Lab 05/25/17 0518 05/26/17  0556   * 264*   * 139   K 4.0 4.3    107   CO2 22* 26   BUN 31* 26*   CREATININE 0.9 0.9   CALCIUM 8.1* 8.0*     CBC:     Recent Labs  Lab 05/25/17 0518 05/26/17  0556 05/26/17  1138   WBC 16.82* 12.42 13.28*   HGB 8.8* 7.2* 7.4*   HCT 25.1* 20.8* 21.4*    141* 121*     CMP:     Recent Labs  Lab 05/25/17 0518 05/26/17  0556   * 264*   * 139   K 4.0 4.3    107   CO2 22* 26   BUN 31* 26*   CREATININE 0.9 0.9   CALCIUM 8.1* 8.0*   PROT 4.6* 4.5*   ALBUMIN 2.3* 2.2*   BILITOT 0.5 0.4   ALKPHOS 67 91   AST 30 55*   ALT 42 67*   ANIONGAP 8 6*   EGFRNONAA >60.0 >60.0     Inflammatory Markers:     Recent Labs  Lab 05/25/17  0816    SEDRATE 15*     Urine Culture: No results for input(s): LABURIN in the last 48 hours.  Urine Studies: No results for input(s): COLORU, APPEARANCEUA, PHUR, SPECGRAV, PROTEINUA, GLUCUA, KETONESU, BILIRUBINUA, OCCULTUA, NITRITE, UROBILINOGEN, LEUKOCYTESUR, RBCUA, WBCUA, BACTERIA, SQUAMEPITHEL, HYALINECASTS in the last 48 hours.    Invalid input(s): WRIGHTSUR  All pertinent lab results from the past 24 hours have been reviewed.    Significant Imaging:    MRI brain 5/13/17:  Multiple foci of FLAIR hyperintensity in the deep cerebral periventricular white matter in a configuration and distribution which can be seen in the setting of underlying demyelinating process such as multiple sclerosis. There are several punctate foci of restricted diffusion including the left aspect of the midbrain tectum which in light of the aforementioned white matter changes may reflect regions of recent demyelination. Superimposed small acute infarcts would be difficult to exclude, although are felt less likely given the overall constellation of findings. Additionally, there is a 0.7 cm enhancing T2/FLAIR identified in the anterior right thalamus concerning for focus of active demyelination.           Also old lesions in corpus collosum         Assessment and Plan:     Fall    ?seizure    Recommend 24h EEG        Diplopia    2/2 JAYDEN  Improving        Demyelinating changes in brain    As seen on MRIs from 5/13  S/p 5.5g solumedrol last admission  Discussed with MS team - will need outpatient f/u appointment        Internuclear ophthalmoplegia of left eye    Improving  Received 5.5g solumedrol last admission  May be 2/2 MS (suspected, but not confirmed)        Depressive disorder, not elsewhere classified    Continue antidepressant.    Patient's wife reports that he has been taking 100mg zoloft, not 150mg, as reported in a pharmacy note(?).    Consider titration/medication adjustment for improved symptom control        * Encephalopathy     "Acute on chronic.  Patient's wife notes more obvious change over the past few weeks (since initially hospitalized for L JAYDEN), but believes that the changes may have started earlier.  She notes that he started to cut back on work (because he was not able to continue working at full pace, she hypothesizes) several months ago.    May actually be chronic, progressive cognitive dysfunction 2/2 significant WM burden (seen on MRI), exacerbated by steroids -- although it should be noted that the patients "confusion" did start prior to initiation of steroids.      UA, CXR wnl this admission.      F/u lyme titer (neg), WNV, SSA , SSB, dsDNA, vasculitis labs, ESR 15, CRP 5.37, HIV neg, REYMUNDO, as well as an autoimmune encephalopathy panel    F/u 24h EEG to assess for simple partial seizures / underlying etiology of waxing and waning MS            VTE Risk Mitigation         Ordered     enoxaparin injection 40 mg  Daily     Route:  Subcutaneous        05/24/17 0554     Place ELIZABETH hose  Until discontinued      05/24/17 0303     Place sequential compression device  Until discontinued      05/24/17 0303     Medium Risk of VTE  Once      05/24/17 0303          Sharon Luong MD  Neurology  [facility]    I have personally taken the history and examined this patient and agree with the resident's note as state above, with the following exceptions:    Patient less responsive today than previous. Less interactive. EEG negative for seizures o/n and during spell of relative unresponsiveness. Of note, WBC count is up today from prior, although blood cx from 5/23 negative to date.    Impression: bihemispheric dysfunction due in part to white matter disease, thought to be MS. Have been looking for mimics this admission. We have considered that his subcortical burden could be causing some of his mental issues,  but decline in mental status and climbing white count would imply some other etiology.     Agree with scanning " this afternoon in light of clinical change. Would also suggest looking at causes of elevated WBC and dropping H/H.     Aldo Mercedes MD, JONATAN, FAAN  Department of Neurology   [facility] [place], [place]

## 2017-05-26 NOTE — ASSESSMENT & PLAN NOTE
Patient with intermittent aphasia, inattention, and delayed response    Area in R frontal lobe concerning for possible new infarct. Patient has hyperdensity in DWI and correlating hypodensity in ADC. However this new lesion may reflect a new demyelinating lesion. Depending on stage of a demyelinating lesion, it could appear as an acute stroke on MRI. At this point, it is difficult to ascertain the cause of the R frontal lesion. Therefore, would treat as an acute stroke until proven otherwise.    Recommendations:  Repeat MRI brain in 2-3 days  Repeat LP  Repeat MRI spine to look for spinal lesions  Continue aspirin 81 and atorvastatin 40  SBP <220  Sq heparin for DVT prophylaxis  PT/OT and speech to evaluate and treat  Neuro checks q2-4h

## 2017-05-26 NOTE — PT/OT/SLP EVAL
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    Mixed hyperlipidemia 11/9/2012    NAFLD (nonalcoholic fatty liver disease) 10/11/2013    CHASE (nonalcoholic steatohepatitis)     Obesity     Stroke     Type 2 diabetes mellitus with diabetic polyneuropathy, with long-term current use of insulin 5/14/2017      Past Surgical History:   Procedure Laterality Date    SKIN CANCER EXCISION         Referring physician: Eern  Date referred to OT: 05/24/17    General Precautions: Standard, fall, seizure  Orthopedic Precautions: N/A  Braces: N/A          Patient History:  Living Environment  Lives With: spouse  Living Arrangements: house  Home Layout: Bedroom on 2nd floor, Bathroom on 2nd floor  Stair Railings at Home: inside, present at both sides  Transportation Available: family or friend will provide  Living Environment Comment:  (Pt lives in 2 story home with no steps to enter with wife - pt states his needs are upstairs - PLOF was I with ADLs and mobility - will have 24 hours S at discharge- has tub/shower combo for bathing)  Equipment Currently Used at Home: none    Prior level of function:   Bed Mobility/Transfers: independent  Grooming: independent  Bathing: independent  Upper Body Dressing: independent  Lower Body Dressing: independent  Toileting: independent  Driving License: Yes  Mode of Transportation: Car     Dominant hand: right    Subjective:  Communicated with nurse prior to session.  "I don't know"  Chief Complaint: confusion  Patient/Family stated goals: pt's wife would love to know what is causing pt's continuing " decline in cognition, attention and function - return to PLOF    Pain/Comfort  Pain Rating 1: 0/10    Objective:  Patient found with: telemetry, peripheral IV (EEG)    Cognitive Exam:  Oriented to: Person, Place and Situation - pt able to state he is in a hospital but not which hospital; pt initially told OT it was  May 2012 but at end of session, able to correctly state year  Follows Commands/attention: Inattentive, Easily distracted and in and out of attention  Communication: occasional verbal responses - when verbal, answers are clear - pt requiring increased cues to return to task - possible seizures due to behaviors of staring into space, no movements, urinary incontinence, etc.   Memory:  Pt with alternating memory accuracy - some points, pt unable to answer questions and at other times, he is accurate - wife correcting pt throughout eval  Safety awareness/insight to disability: impaired  Coping skills/emotional control: stoic    Visual/perceptual:  Intact    Physical Exam:  Postural examination/scapula alignment: Head forward and Posterior pelvic tilt  Skin integrity: Visible skin intact  Edema: Mild UEs and LEs    Sensation:   Pt unable to answer questions during sensation testing - wife states sometimes he reports diminished light touch sensation in UEs/LEs and other times he doesn't    Upper Extremity Range of Motion:  Right Upper Extremity: WFL  Left Upper Extremity: WFL    Upper Extremity Strength:  Right Upper Extremity: WFL  Left Upper Extremity: WFL   Strength: good    Fine motor coordination:   Poor coordination during evaluation    Gross motor coordination: Poor coordination during evaluation    Functional Mobility:  Bed Mobility:  Rolling/Turning to Left: Moderate assistance  Rolling/Turning Right: Moderate assistance  Scooting/Bridging: Maximum Assistance  Supine to Sit: Moderate Assistance (able to lower B LEs but needed assist with trunk)  Sit to Supine: Maximum Assistance    Transfers:  Sit  <> Stand Assistance: Maximum Assistance  Sit <> Stand Assistive Device: No Assistive Device    Functional Ambulation: Not tested - pt unsafe to ambulate during evaluation due to altering levels of arousal throughout session    Activities of Daily Living:  · Feeding: Did not occur during evaluation - wife reports she needed to feed her  breakfast today    · UE Dressing: Mod assist - pt donned/doffed hospital gown while supine - needed assist doffing gown - able to insert UEs into sleeves while donning - needed increased cues to attend to task    · LE Dressing: Max assist - pt needed assistance doffing underwear and donning socks - able to doff socks while supine in bed    · Grooming: Mod assist to wash face with wash cloth - pt required increased cues to attend to task - performed supine in bed    · Toileting: Total assistance - pt incontinent of urine during evaluation - needed assistance with hygiene and clothing management    · Bathing: Max assist - pt bathed 3/10 body parts - pt able to wash abdomen, R upper leg and perineal during sponge bath in bed - while washing abdomen, pt perseverated on same spot and required cues to move washcloth around entire stomach area      Balance:   Static Sit: POOR: Needs MODERATE assist to maintain  Dynamic Sit: POOR: N/A  Static Stand: N/A  Dynamic stand: N/A   ** Once pt sat on EOB, he fell back and unable to maintain balance    Therapeutic Activities and Exercises:  · Pt completed ADLs and func mobility for evaluation  · Pt required increased assist with all self care tasks - wife noting decline of function since arrival stating he may be having seizures - behaviors observed are consistent with seizures (looking out in space, disorientation, not responding to cues/name, incontinence, decreased balance, etc.)  · OT assisted spouse with bed positioning and self care - educating on proper handling techniques while doing so.  · Whiteboard updated    AM-PAC 6 CLICK ADL  How  "much help from another person does this patient currently need?  1 = Unable, Total/Dependent Assistance  2 = A lot, Maximum/Moderate Assistance  3 = A little, Minimum/Contact Guard/Supervision  4 = None, Modified [place]/Independent    Putting on and taking off regular lower body clothing? : 2  Bathing (including washing, rinsing, drying)?: 2  Toileting, which includes using toilet, bedpan, or urinal? : 2  Putting on and taking off regular upper body clothing?: 2  Taking care of personal grooming such as brushing teeth?: 2  Eating meals?: 2  Total Score: 12    AM-PAC Raw Score CMS "G-Code Modifier Level of Impairment Assistance   6 % Total / Unable   7 - 9 CM 80 - 100% Maximal Assist   10-14 CL 60 - 80% Moderate Assist   15 - 19 CK 40 - 60% Moderate Assist   20 - 22 CJ 20 - 40% Minimal Assist   23 CI 1-20% SBA / CGA   24 CH 0% Independent/ Mod I       Patient left HOB elevated with all lines intact, call button in reach, nurse notified and wife present    Assessment:  Bessy Nunez is a 57 y.o. male with a medical diagnosis of Syncope and presents with decreased level of arousal, confusion, decreased self care, decreased endurance, decreased balance and decreased func mobility.  Pt currently with EEG and noted to progressively decline with ADls and func Mobility, as well as, is noted with decreased orientation/increased confusion.  Pt will benefit from continued skilled OT services in order to work towards increasing his safety and level of independence with self care and func mobility.  At this time, OT is recommending SNF vs HHOT, depending on progress with OT and medical stability.  DME recommendations include [facility] and [facility] - mobility AD devices are still being evaluated.    Rehab identified problem list/impairments: Rehab identified problem list/impairments: weakness, impaired endurance, impaired self care skills, impaired functional mobilty, impaired sensation, gait instability, impaired balance, " decreased coordination, decreased upper extremity function, decreased lower extremity function, decreased safety awareness, impaired coordination, impaired fine motor    Rehab potential is fair.    Activity tolerance: Fair    Discharge recommendations: Discharge Facility/Level Of Care Needs:  (TBD - pt is experience continued decline in function and undergoing extensive testing - at this time, functionally, pt would benefit from SNF vs HHOT)     Barriers to discharge: Barriers to Discharge: Inaccessible home environment    Equipment recommendations: bath bench, bedside commode     GOALS:    Occupational Therapy Goals        Problem: Occupational Therapy Goal    Goal Priority Disciplines Outcome Interventions   Occupational Therapy Goal     OT, PT/OT Ongoing (interventions implemented as appropriate)    Description:  Goals to be met by: 06/04/17     Patient will increase functional independence with ADLs by performing:    Feeding with Modified [place].  UE Dressing with Minimum [place].  LE Dressing with Moderate Assistance.  Grooming while seated with Set-up Assistance.  Toileting from bedside commode with Moderate Assistance for hygiene and clothing management.   Supine to sit with Minimal Assistance.  Toilet transfer to bedside commode with Moderate Assistance.  Upper extremity exercise program x12 reps per handout, with supervision.                      PLAN:  Patient to be seen 4 x/week to address the above listed problems via self-care/home management, therapeutic activities, therapeutic exercises, neuromuscular re-education  Plan of Care expires: 06/23/17  Plan of Care reviewed with: spouse, patient         Maxine Johnson, OT  05/26/2017

## 2017-05-26 NOTE — ASSESSMENT & PLAN NOTE
"Acute on chronic.  Patient's wife notes more obvious change over the past few weeks (since initially hospitalized for L JAYDEN), but believes that the changes may have started earlier.  She notes that he started to cut back on work (because he was not able to continue working at full pace, she hypothesizes) several months ago.    May actually be chronic, progressive cognitive dysfunction 2/2 significant WM burden (seen on MRI), exacerbated by steroids -- although it should be noted that the patients "confusion" did start prior to initiation of steroids.      UA, CXR wnl this admission.      F/u lyme titer (neg), WNV, SSA , SSB, dsDNA, vasculitis labs, ESR 15, CRP 5.37, HIV neg, REYMUNDO, as well as an autoimmune encephalopathy panel    F/u 24h EEG to assess for simple partial seizures / underlying etiology of waxing and waning MS  "

## 2017-05-26 NOTE — PLAN OF CARE
Problem: Patient Care Overview  Goal: Plan of Care Review  Outcome: Ongoing (interventions implemented as appropriate)   Pt continues on EEG monitoring. Pt having episodes of  Urine incontinence and unaware. Pt free from falls. Pt wears non slip socks when ambulating. Pt bed low and locked position. Pt afebrile. Pt speaks yes or no opal asked a question. Pt BG monitored ACHS and once this morning at 2am .  Pt IV site without redness or edema. Pt denies any discomfort.

## 2017-05-26 NOTE — PROGRESS NOTES
Pt being transferred down by stretcher with Charge DIANA Moore and Critical care NP Kathy Peter for STAT MRI and CT of head.

## 2017-05-26 NOTE — PROGRESS NOTES
Dr. Mago MD notified of the following:    - Pt unable to state his name or verbally respond to questions at this time.   - Posturing noted. Right side weakness, pt unable to flex knees to chest.    - Pt unable to follow any commands.    Verbal orders given by MD to pull ativan 2mg IV and wait to give medication until MD at the bedside. Will carry out all orders.    Update:  MD at the bedside.  EEG reviewed.  No seizure noted.  Orders by MD to hold Ativan at this time.  Will continue to monitor.

## 2017-05-26 NOTE — PROCEDURES
DATE OF STUDY:  05/25/2017    EEG NUMBER:  FH-.    REQUESTED BY:  Dr. Story.    LOCATION OF SERVICE:  [id number].    ICU EEG AND VIDEO MONITORING REPORT    METHODOLOGY:  Electroencephalographic (EEG) is recorded with electrodes placed   according to the International 10-20 placement system.  Thirty two (32) channels   of digital signal (sampling rate of 512/sec), including T1 and T2, were   simultaneously recorded from the scalp and may include EKG, EMG, and/or eye   monitors.  Recording band pass was 0.1 to 512 Hz.  Digital video recording of   the patient is simultaneously recorded with the EEG.  The patient is instructed   to report clinical symptoms which may occur during the recording session.  EEG   and video recording are stored and archived in digital format.  Activation   procedures, which include photic stimulation, hyperventilation and instructing   patients to perform simple tasks, are done in selected patients.    The EEG is displayed on a monitor screen and can be reviewed using different   montages.  Computer-assisted analysis is employed to detect spike and   electrographic seizure activity.  The entire record is submitted for computer   analysis.  The entire recording is visually reviewed, and the times identified   by computer analysis as being spikes or seizures are reviewed again.    Compressed spectral analysis (CSA) is also performed on the activity recorded   from each individual channel.  This is displayed as a power display of   frequencies from 0 to 30 Hz over time.  The CSA is reviewed looking for   asymmetries in power between homologous areas of the scalp, then compared with   the original EEG recording.    TapTap software was also utilized in the review of this study.  This software   suite analyzes the EEG recording in multiple domains.  Coherence and rhythmicity   are computed to identify EEG sections which may contain organized seizures.    Each channel undergoes analysis to detect  the presence of spike and sharp waves   which have special and morphological characteristics of epileptic activity.  The   routine EEG recording is converted from special into frequency domain.  This is   then displayed comparing homologous areas to identify areas of significant   asymmetry.  Algorithm to identify non-cortically generated artifact is used to   separate artifact from the EEG.    RECORDING TIMES:  Start on 05/25/2017, at 15:44.  End on 05/25/2017, at 18:35.  A total of 2 hours and 8 minutes of EEG monitoring was obtained.    EEG FINDINGS:  Recording was obtained at the patient's bedside in the ICU.  The   patient was moving around spontaneously and interacting with the nurses and his   wife.  Background was a somewhat irregular 5 Hz to 6 Hz theta, which was seen   diffusely.  At times, some slower theta was noted intermixed.  The composition   of the background fluctuated somewhat, but overall, there were no lateralized or   focal features and no spike or sharp wave activity was seen.  Activation   procedures were not carried out.    IMPRESSION:  Abnormal EEG with moderate disorganization and slowing indicative   of a nonfocal and nonspecific encephalopathy.    CLINICAL CORRELATION:  The patient is a 57-year-old male with fluctuating   cognitive function.  There are times of periods in which he is not responding.    There is a question of whether he is experiencing seizures, but this tracing   shows only a moderate encephalopathy, which is nonfocal and no evidence for an   irritative process.      RR/HN  dd: 05/26/2017 14:38:39 (CDT)  td: 05/26/2017 15:13:49 (CDT)  Doc ID   #[id number]  Job ID #[id number]    CC:

## 2017-05-26 NOTE — PLAN OF CARE
Problem: Occupational Therapy Goal  Goal: Occupational Therapy Goal  Goals to be met by: 06/04/17     Patient will increase functional independence with ADLs by performing:    Feeding with Modified [place].  UE Dressing with Minimum [place].  LE Dressing with Moderate Assistance.  Grooming while seated with Set-up Assistance.  Toileting from bedside commode with Moderate Assistance for hygiene and clothing management.   Supine to sit with Minimal Assistance.  Toilet transfer to bedside commode with Moderate Assistance.  Upper extremity exercise program x12 reps per handout, with supervision.    Outcome: Ongoing (interventions implemented as appropriate)    Pt was agreeable to OT evaluation.  Goals established to assist pt with returning to PLOF regarding ADLs and func mobility.  Pt will benefit from skilled OT services in order to increase his level of safety and independence with ADLs and mobility.      Maxine Johnson, OT  5/26/2017

## 2017-05-26 NOTE — PT/OT/SLP DISCHARGE
Physical Therapy Discharge Summary    Bessy Nunez  MRN: [id number]   Internuclear ophthalmoplegia of left eye   Patient Discharged from acute Physical Therapy on 17.  Please refer to prior PT noted date on 17 for functional status.     Assessment:   Patient appropriate for care in another setting.  GOALS:    Physical Therapy Goals        Problem: Physical Therapy Goal    Goal Priority Disciplines Outcome Goal Variances Interventions   Physical Therapy Goal     PT/OT, PT Ongoing (interventions implemented as appropriate)     Description:  Goals to be met by: 2017     Patient will increase functional independence with mobility by performin. Sit to stand transfer with [place]  2. Bed to chair transfer with Supervision using AD or No AD  3. Gait x150 feet with Supervision using AD or No AD  4. Ascend/descend x1 flight of stairs with left Handrails Supervision  5. Stand for x10 minutes with Stand-by Assistance while performing dynamic standing balance tasks  6. Lower extremity exercise program x15 reps per handout, with supervision                    Reasons for Discontinuation of Therapy Services  Transfer to alternate level of care.      Plan:  Patient Discharged to: Outpatient Therapy Services.    Dayana Fine DPT, PT  2017

## 2017-05-26 NOTE — SUBJECTIVE & OBJECTIVE
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    Mixed hyperlipidemia 11/9/2012    NAFLD (nonalcoholic fatty liver disease) 10/11/2013    CHASE (nonalcoholic steatohepatitis)     Obesity     Stroke     Type 2 diabetes mellitus with diabetic polyneuropathy, with long-term current use of insulin 5/14/2017     Past Surgical History:   Procedure Laterality Date    SKIN CANCER EXCISION       Family History   Problem Relation Age of Onset    Heart disease Mother     Hypertension Mother     Heart failure Mother     Cancer Father      lung    Diabetes Maternal Aunt      Social History   Substance Use Topics    Smoking status: Never Smoker    Smokeless tobacco: Never Used    Alcohol use No     Review of patient's allergies indicates:  No Known Allergies  Medications: I have reviewed the current medication administration record.    Prescriptions Prior to Admission   Medication Sig Dispense Refill Last Dose    aspirin (ECOTRIN) 81 MG EC tablet Take 81 mg by mouth once daily.   5/13/2017    atenolol (TENORMIN) 50 MG tablet Take 1 tablet (50 mg total) by mouth once daily. 90 tablet 3 5/13/2017    atorvastatin (LIPITOR) 40 MG tablet Take 1 tablet (40 mg total) by mouth once daily. 90 tablet 3 5/13/2017    diltiaZEM (DILACOR XR) 240 MG CDCR Take 1 capsule (240 mg total) by mouth once daily. 90 capsule 3 5/13/2017    insulin degludec (TRESIBA FLEXTOUCH U-200) 200 unit/mL (3 mL) InPn Inject 42 Units into the skin every evening. 3 Syringe 1 5/12/2017 at Unknown time    insulin needles, disposable, (BD ULTRA-FINE ANA PEN NEEDLES) 32 x 5/32 " Ndle Inject twice daily 100 each 3 Past Week at Unknown time    liraglutide 0.6 mg/0.1 mL, 18 mg/3 mL, subq PNIJ (VICTOZA 3-TOMASZ) 0.6 mg/0.1 mL (18 mg/3 mL) PnIj Inject 1.8 mg into the skin once daily. 9 mL 11 5/13/2017    metformin (GLUCOPHAGE-XR) 500 MG 24 hr tablet Take 2 tablets " (1,000 mg total) by mouth 2 (two) times daily with meals. 360 tablet 0 5/13/2017    methylPREDNISolone (MEDROL DOSEPACK) 4 mg tablet Use as directed on dose-pack -Pharmacy please specify directions for patient 1 Package 0     omega-3 fatty acids-vitamin E (FISH OIL) 1,000 mg Cap Take 1 capsule by mouth 2 (two) times daily.  0 5/13/2017    sertraline (ZOLOFT) 100 MG tablet 1 and half tablet daily (Patient taking differently: Take 150 mg by mouth once daily. ) 135 tablet 3 5/13/2017    valsartan (DIOVAN) 320 MG tablet Take 1 tablet (320 mg total) by mouth once daily. 90 tablet 3 5/13/2017    vitamin D 1000 units Tab Take 5 tablets (5,000 Units total) by mouth once daily.          Review of Systems   Constitutional: Negative for chills and fever.   Eyes: Positive for visual disturbance. Negative for redness.   Respiratory: Negative for cough and shortness of breath.    Cardiovascular: Negative for chest pain and palpitations.   Gastrointestinal: Negative for nausea and vomiting.   Skin: Negative for rash.   Neurological: Positive for speech difficulty. Negative for dizziness, seizures, weakness, numbness and headaches.   Psychiatric/Behavioral: Positive for confusion.     Objective:     Vital Signs (Most Recent):  Temp: 98.4 °F (36.9 °C) (05/26/17 1115)  Pulse: 65 (05/26/17 1430)  Resp: 18 (05/26/17 1430)  BP: 124/62 (05/26/17 1430)  SpO2: 96 % (05/26/17 1430)    Vital Signs Range (Last 24H):  Temp:  [97.8 °F (36.6 °C)-98.4 °F (36.9 °C)]   Pulse:  [65-92]   Resp:  [18-19]   BP: (102-127)/(51-62)   SpO2:  [96 %-98 %]     Physical Exam   Constitutional: He appears well-developed and well-nourished. No distress.   HENT:   Head: Normocephalic and atraumatic.   Eyes: EOM are normal. Pupils are equal, round, and reactive to light.   Cardiovascular: Normal rate.    Pulmonary/Chest: Effort normal. No respiratory distress.   Abdominal: Soft. He exhibits no distension.   Musculoskeletal: Normal range of motion.    Neurological: He is alert.   Skin: Skin is warm and dry.   Psychiatric: His affect is blunt. He is slowed. He is noncommunicative. He is inattentive.   Vitals reviewed.      Neurological Exam:   LOC: alert and follows requests  Language: No aphasia  Speech: No dysarthria  Orientation: Person, Place, Time  Memory: Recent memory intact, Remote memory intact, Age correct, Month correct  Visual Fields (CN II): Full  EOM (CN III, IV, VI): Full/intact  Pupils (CN III, IV, VI): PERRL  Facial Sensation (CN V): Symmetric  Facial Movement (CN VII): symmetric facial expression  Hearing (CN VIII): intact bilaterally  Motor*: Arm Left:  Normal (5/5), Leg Left:   Normal (5/5), Arm Right:   Normal (5/5), Leg Right:   Normal (5/5)  Cerebellar*: Normal limb, Normal gait  , Normal stance  Sensation: intact to light touch, temperature and vibration  Tone: Arm-Left: normal; Leg-Left: normal; Arm-Right: normal; Leg-Right: normal    NIH Stroke Scale:  Interval: baseline (upon arrival/admit)  Level of Consciousness: 0 - alert  LOC Questions: 2 - answers none correctly  LOC Commands: 0 - performs both correctly  Best Gaze: 1 - partial gaze palsy  Visual: 0 - no visual loss  Facial Palsy: 0 - normal  Motor Left Arm: 0 - no drift  Motor Right Arm: 0 - no drift  Motor Left Le - no drift  Motor Right Le - no drift  Limb Ataxia: 0 - absent  Sensory: 0 - normal  Best Language: 2 - severe aphasia  Dysarthria: 2 - near to unintelligible  Extinction and Inattention: 0 - no neglect  NIH Stroke Scale Total: 7      Laboratory:  CMP:   Recent Labs  Lab 17  1502   CALCIUM 8.4*   ALBUMIN 2.5*   PROT 5.1*      K 4.0   CO2 25      BUN 23*   CREATININE 0.9   ALKPHOS 97   ALT 66*   AST 43*   BILITOT 0.5     CBC:   Recent Labs  Lab 17  1502   WBC 19.09*   RBC 2.70*   HGB 8.1*   HCT 22.9*      MCV 85   MCH 30.0   MCHC 35.4     Lipid Panel: No results for input(s): CHOL, LDLCALC, HDL, TRIG in the last 168  hours.  Coagulation:   Recent Labs  Lab 05/23/17  1057   INR 1.0   APTT <21.0     Platelet Aggregation Study: No results for input(s): PLTAGG, PLTAGINTERP, PLTAGREGLACO, ADPPLTAGGREG in the last 168 hours.  Hgb A1C: No results for input(s): HGBA1C in the last 168 hours.  TSH:   Recent Labs  Lab 05/24/17  0626   TSH 1.786       Diagnostic Results:  Brain Imaging:       MRI Head. Date: 05/26/17  Please note evaluation is markedly limited by motion artifact, and the patient could not complete the exam.  However, the limited diffusion MRI findings are most compatible with acute infarction involving the right frontal and right temporal lobes as well as the left cerebellar hemisphere and left brainstem, as above.  No evidence of intracranial hemorrhage.    Echo. Date: 05/13/17  -EF 65%  -mild LAE

## 2017-05-27 LAB
ALBUMIN SERPL BCP-MCNC: 2.4 G/DL
ALP SERPL-CCNC: 90 U/L
ALT SERPL W/O P-5'-P-CCNC: 57 U/L
AMMONIA PLAS-SCNC: 24 UMOL/L
ANION GAP SERPL CALC-SCNC: 6 MMOL/L
ANTI-SSA ANTIBODY: 0.59 EU
ANTI-SSA INTERPRETATION: NEGATIVE
ANTI-SSB ANTIBODY: 0.28 EU
ANTI-SSB INTERPRETATION: NEGATIVE
ARSENIC BLD-MCNC: 7 NG/ML (ref 0–12)
AST SERPL-CCNC: 40 U/L
B2 MICROGLOB SERPL-MCNC: 2.2 UG/ML
BASOPHILS # BLD AUTO: 0.01 K/UL
BASOPHILS NFR BLD: 0.1 %
BILIRUB SERPL-MCNC: 0.5 MG/DL
BILIRUB UR QL STRIP: NEGATIVE
BUN SERPL-MCNC: 18 MG/DL
CADMIUM BLD-MCNC: <0.2 NG/ML (ref 0–4.9)
CALCIUM SERPL-MCNC: 7.8 MG/DL
CHLORIDE SERPL-SCNC: 109 MMOL/L
CITY: NORMAL
CLARITY UR REFRACT.AUTO: CLEAR
CO2 SERPL-SCNC: 25 MMOL/L
COLOR UR AUTO: YELLOW
COUNTY: NORMAL
CREAT SERPL-MCNC: 0.8 MG/DL
DIFFERENTIAL METHOD: ABNORMAL
EOSINOPHIL # BLD AUTO: 0.1 K/UL
EOSINOPHIL NFR BLD: 0.6 %
ERYTHROCYTE [DISTWIDTH] IN BLOOD BY AUTOMATED COUNT: 14.1 %
EST. GFR  (AFRICAN AMERICAN): >60 ML/MIN/1.73 M^2
EST. GFR  (NON AFRICAN AMERICAN): >60 ML/MIN/1.73 M^2
GLUCOSE SERPL-MCNC: 88 MG/DL
GLUCOSE UR QL STRIP: NEGATIVE
GUARDIAN FIRST NAME: NORMAL
GUARDIAN LAST NAME: NORMAL
HCT VFR BLD AUTO: 21.2 %
HGB BLD-MCNC: 7.2 G/DL
HGB UR QL STRIP: NEGATIVE
HOME PHONE: NORMAL
KETONES UR QL STRIP: NEGATIVE
LEAD BLD-MCNC: <1 MCG/DL (ref 0–4.9)
LEUKOCYTE ESTERASE UR QL STRIP: NEGATIVE
LYMPHOCYTES # BLD AUTO: 2.3 K/UL
LYMPHOCYTES NFR BLD: 18.8 %
MCH RBC QN AUTO: 29.3 PG
MCHC RBC AUTO-ENTMCNC: 34 %
MCV RBC AUTO: 86 FL
MERCURY BLD-MCNC: <1 NG/ML (ref 0–9)
MONOCYTES # BLD AUTO: 0.7 K/UL
MONOCYTES NFR BLD: 5.9 %
NEUTROPHILS # BLD AUTO: 9.1 K/UL
NEUTROPHILS NFR BLD: 73.2 %
NITRITE UR QL STRIP: NEGATIVE
PATH REV BLD -IMP: NORMAL
PH UR STRIP: 5 [PH] (ref 5–8)
PLATELET # BLD AUTO: 155 K/UL
PMV BLD AUTO: 9.1 FL
POCT GLUCOSE: 110 MG/DL (ref 70–110)
POCT GLUCOSE: 126 MG/DL (ref 70–110)
POCT GLUCOSE: 153 MG/DL (ref 70–110)
POCT GLUCOSE: 176 MG/DL (ref 70–110)
POCT GLUCOSE: 206 MG/DL (ref 70–110)
POCT GLUCOSE: 97 MG/DL (ref 70–110)
POTASSIUM SERPL-SCNC: 3.9 MMOL/L
PROT SERPL-MCNC: 4.9 G/DL
PROT UR QL STRIP: NEGATIVE
RACE: NORMAL
RBC # BLD AUTO: 2.46 M/UL
SODIUM SERPL-SCNC: 140 MMOL/L
SP GR UR STRIP: 1.01 (ref 1–1.03)
STATE: NORMAL
STREET ADDRESS: NORMAL
URN SPEC COLLECT METH UR: NORMAL
UROBILINOGEN UR STRIP-ACNC: 2 EU/DL
VENOUS/CAPILLARY: NORMAL
VIT B12 SERPL-MCNC: 907 PG/ML
WBC # BLD AUTO: 12.36 K/UL
ZIP: NORMAL

## 2017-05-27 PROCEDURE — 63600175 PHARM REV CODE 636 W HCPCS

## 2017-05-27 PROCEDURE — 86148 ANTI-PHOSPHOLIPID ANTIBODY: CPT

## 2017-05-27 PROCEDURE — 84207 ASSAY OF VITAMIN B-6: CPT

## 2017-05-27 PROCEDURE — 63600175 PHARM REV CODE 636 W HCPCS: Performed by: INTERNAL MEDICINE

## 2017-05-27 PROCEDURE — G8997 SWALLOW GOAL STATUS: HCPCS | Mod: CI

## 2017-05-27 PROCEDURE — 86147 CARDIOLIPIN ANTIBODY EA IG: CPT

## 2017-05-27 PROCEDURE — 36415 COLL VENOUS BLD VENIPUNCTURE: CPT

## 2017-05-27 PROCEDURE — 20600001 HC STEP DOWN PRIVATE ROOM

## 2017-05-27 PROCEDURE — 99233 SBSQ HOSP IP/OBS HIGH 50: CPT | Mod: ,,, | Performed by: HOSPITALIST

## 2017-05-27 PROCEDURE — 92610 EVALUATE SWALLOWING FUNCTION: CPT

## 2017-05-27 PROCEDURE — 25000003 PHARM REV CODE 250: Performed by: STUDENT IN AN ORGANIZED HEALTH CARE EDUCATION/TRAINING PROGRAM

## 2017-05-27 PROCEDURE — 80053 COMPREHEN METABOLIC PANEL: CPT

## 2017-05-27 PROCEDURE — 84425 ASSAY OF VITAMIN B-1: CPT

## 2017-05-27 PROCEDURE — 82607 VITAMIN B-12: CPT

## 2017-05-27 PROCEDURE — 82232 ASSAY OF BETA-2 PROTEIN: CPT

## 2017-05-27 PROCEDURE — 81003 URINALYSIS AUTO W/O SCOPE: CPT

## 2017-05-27 PROCEDURE — 85025 COMPLETE CBC W/AUTO DIFF WBC: CPT

## 2017-05-27 PROCEDURE — G8996 SWALLOW CURRENT STATUS: HCPCS | Mod: CJ

## 2017-05-27 PROCEDURE — 82140 ASSAY OF AMMONIA: CPT

## 2017-05-27 PROCEDURE — 99231 SBSQ HOSP IP/OBS SF/LOW 25: CPT | Mod: ,,, | Performed by: PSYCHIATRY & NEUROLOGY

## 2017-05-27 PROCEDURE — 99233 SBSQ HOSP IP/OBS HIGH 50: CPT | Mod: ,,, | Performed by: PSYCHIATRY & NEUROLOGY

## 2017-05-27 RX ORDER — SODIUM CHLORIDE 9 MG/ML
INJECTION, SOLUTION INTRAVENOUS CONTINUOUS
Status: DISCONTINUED | OUTPATIENT
Start: 2017-05-27 | End: 2017-05-28

## 2017-05-27 RX ADMIN — VITAMIN D, TAB 1000IU (100/BT) 5000 UNITS: 25 TAB at 05:05

## 2017-05-27 RX ADMIN — SODIUM CHLORIDE: 0.9 INJECTION, SOLUTION INTRAVENOUS at 03:05

## 2017-05-27 RX ADMIN — LEVETIRACETAM 1000 MG: 10 INJECTION INTRAVENOUS at 09:05

## 2017-05-27 RX ADMIN — ASPIRIN 81 MG: 81 TABLET, COATED ORAL at 05:05

## 2017-05-27 RX ADMIN — INSULIN ASPART 18 UNITS: 100 INJECTION, SOLUTION INTRAVENOUS; SUBCUTANEOUS at 06:05

## 2017-05-27 RX ADMIN — CEFTRIAXONE 1 G: 1 INJECTION, SOLUTION INTRAVENOUS at 11:05

## 2017-05-27 RX ADMIN — ATORVASTATIN CALCIUM 40 MG: 20 TABLET, FILM COATED ORAL at 05:05

## 2017-05-27 NOTE — PLAN OF CARE
Problem: Patient Care Overview  Goal: Plan of Care Review  Outcome: Ongoing (interventions implemented as appropriate)  Pt mental status has been waxing/waning this shift, q2 neuro checks this shift, pt disorientedx4 but able to state birthday sometimes. Pt is in bed wearing non-skid footwear, bed in low/locked position, call bell next to pt, bed alarm set, wife at bedisde. Pt VSS, on tele SR in the 60's-70's. Pt is afebrile at this time. Proper hand hygiene performed before and after pt care activities. BG monitored as ordered, no SSI needed this shift. Pt went down for abdominal US this shift, showed enlarged fatty liver and enlarged spleen. Pt has CT of head and MRI of brain on day shift yd, showed multiple infarcts. Repeat blood cx's NGTD. Pt wife reminded to use call bell to call for assistance, verbalizes understanding. Will continue to monitor.

## 2017-05-27 NOTE — PROGRESS NOTES
Patient arousable to touch upon assessment but is unable to answer any orientation questions - stares blankly when spoken to. Patient follows some commands - able to weakly squeeze hands and turn in bed when asked. Patient incontinent of stool - BM appears dark on pad. H/H 7.2/21.2 this am. /50, HR 65. Patient's wife reports his baseline BP is 150s/90s at home and voices concerns about BP trend.     Spoke with Sunil Hudson (neurology) and Jez (IM3) re: above assessment and wife's concerns. Both MDs to bedside to assess patient.     Okay per Dr. Story to perform I/O cath to obtain urine specimen for UA. MD to place order.

## 2017-05-27 NOTE — PROGRESS NOTES
Spoke with CT re: patient's scan. No time yet for CT scan - okay to feed patient/give meds now. CT to follow up with RN when ready to scan patient.

## 2017-05-27 NOTE — PROGRESS NOTES
Hospital Medicine  Progress Note      SUBJECTIVE:     Chief Complaint / Reason for Admission: Encephalopathy    LOS: 4 days  Admit Date: 5/23/2017    Interval history  No acute events overnight. VSS, afebrile. Patient has persistent flat affect with occasional smiling, waxing/waning of following commands and being verbal, no focal neuro deficits.  Nursing reports dark stool.  Hgb and hemodynamically stable.    Scheduled Medications   aspirin  81 mg Oral Daily    atenolol  50 mg Oral Daily    atorvastatin  40 mg Oral Daily    cefTRIAXone (ROCEPHIN) IVPB  1 g Intravenous Q24H    diltiaZEM  240 mg Oral Daily    insulin aspart  18 Units Subcutaneous TIDWM    insulin detemir  40 Units Subcutaneous QHS    levetiracetam IVPB  1,000 mg Intravenous Q12H    valsartan  320 mg Oral Daily    vitamin D  5,000 Units Oral Daily       Continous Medications       Medications PRN  dextrose 50%, dextrose 50%, glucagon (human recombinant), glucose, glucose, insulin aspart    OBJECTIVE:     Temp:  [97.8 °F (36.6 °C)-98.5 °F (36.9 °C)]   Pulse:  [62-92]   Resp:  [15-20]   BP: ()/(46-65)   SpO2:  [96 %-100 %]     Vitals:    05/27/17 0943   BP: (!) 103/58   Pulse: 69   Resp: 20   Temp: 98.5 °F (36.9 °C)       I & O (Last 24H):  I/O last 3 completed shifts:  In: 490 [P.O.:340; IV Piggyback:150]  Out: 200 [Urine:200]    Physical Exam:  General: well developed, well nourished, no distress  HENT: Head:normocephalic, atraumatic. Ears:not examined. Nose: no discharge. Throat: lips, mucosa, and tongue normal; teeth and gums normal and no throat erythema.  Eyes: conjunctivae/corneas clear. PERRL.   Neck: supple, symmetrical, trachea midline, no JVD  Lungs:  clear to auscultation bilaterally and normal respiratory effort  Cardiovascular: Heart: regular rate and rhythm, S1, S2 normal, no murmur, click, rub or gallop. Chest Wall: no tenderness. Extremities: no cyanosis or edema, or clubbing. Pulses: 2+ and symmetric.  Abdomen/Rectal:  Abdomen: soft, non-tender non-distented; bowel sounds normal; no masses,  no organomegaly. Rectal: not examined  Neuro: no focal neuro deficits, alert, waxing/waning of orientation  Psych/Behavioral: Persistent flat affect but can smile to jokes, following only some commands and answering some questions.    Laboratory:  CBC BMP     Recent Labs  Lab 05/26/17  1905 05/27/17  0500   WBC 14.22* 12.36   HGB 7.3* 7.2*   HCT 20.9* 21.2*   MCV 85 86    155   RDW 13.8 14.1      Recent Labs  Lab 05/26/17  1502 05/27/17  0500    140   K 4.0 3.9    109   CO2 25 25   BUN 23* 18   CREATININE 0.9 0.8   GLU 95 88   CALCIUM 8.4* 7.8*          LFTs    Recent Labs  Lab 05/26/17  1502 05/27/17  0500   PROT 5.1* 4.9*   BILITOT 0.5 0.5   ALKPHOS 97 90   AST 43* 40   ALT 66* 57*      Urine    Recent Labs  Lab 05/23/17  1716   COLORU Yellow   SPECGRAV 1.015   PHUR 5.0   PROTEINUA Negative   NITRITE Negative   LEUKOCYTESUR Negative   UROBILINOGEN Negative        Coag Labs    Recent Labs  Lab 05/23/17  1057   INR 1.0    Mag & Phos    Recent Labs  Lab 05/26/17  1502   MG 1.6   PHOS 3.9        Lactic Acid    Recent Labs  Lab 05/23/17  1058   LACTATE 3.3*       Cardiac Enzyme    Recent Labs  Lab 05/23/17  1057 05/23/17  1058 05/26/17  1138   CPK 20  20  --  18*   TROPONINI  --  0.024  --    CPKMB 0.5  --   --    MB 2.5  --   --        BNP    Recent Labs  Lab 05/23/17  1057   BNP 13       ABG    Recent Labs  Lab 05/26/17  1449   PH 7.406   PO2 70*   PCO2 38.3   HCO3 24.1   BE -1       Thyroid    Recent Labs  Lab 05/24/17  0626   TSH 1.786       Lipase  No results for input(s): LIPASE in the last 168 hours.    Hemoglobin A1C  No results for input(s): HGBA1C in the last 168 hours.    POCT Glucose  Recent Labs      05/25/17   1646  05/25/17   2141  05/26/17   0220  05/26/17   0758  05/26/17   1214  05/26/17   1427  05/26/17   1810  05/26/17   2214   POCTGLUCOSE  182*  270*  274*  206*  176*  103  126*  152*          Radiology:  Imaging Results          US Abdomen Limited (Final result)  Result time 05/26/17 21:09:13    Final result by Torsten Kolb MD (05/26/17 21:09:13)                 Impression:        Hepatosplenomegaly    Evidence of hepatic steatosis     ______________________________________     Electronically signed by resident: CONNIE FLORES MD  Date:     05/26/17  Time:    20:50            As the supervising and teaching physician, I personally reviewed the images and resident's interpretation and I agree with the findings.          Electronically signed by: TORSTEN KOLB MD  Date:     05/26/17  Time:    21:09              Narrative:    Time of Procedure: 05/26/17 19:54:00  Accession # [id number]    Reason for study: Transaminitis    Comparison: ultrasound abdomen 05/11/2015    Findings: The liver is enlarged measuring 16.7 cm.  Hepatic parenchyma is attenuating, with HRI of 1.77. There are no hepatic masses.  No intra- or extrahepatic biliary ductal dilatation. The common bile duct measures 0.5 cm.  The gallbladder appears normal. No evidence for cholelithiasis.  Sonographic Go's sign is negative. The visualized portions of the pancreas appear normal. The spleen is enlarged and measures 14.7 x 4.0 cm. No ascites.                             CT Head Without Contrast (Final result)  Result time 05/26/17 17:49:27    Final result by Connie Hagen MD (05/26/17 17:49:27)                 Impression:         Motion severely limits the examination.    No detrimental change in this patient with volume loss, and multiple areas of hyperattenuation consistent with a infarcts, and periventricular hypoattenuation likely related to the patient's demyelinating disease.  ______________________________________     Electronically signed by resident: EVELIN KAPADIA  Date:     05/26/17  Time:    17:39            As the supervising and teaching physician, I personally reviewed the images and resident's interpretation  and I agree with the findings.          Electronically signed by: Dr. Evan Hagen MD  Date:     05/26/17  Time:    17:49              Narrative:    CT head without contrast    CLINICAL INDICATION: acute encephalopathy    TECHNIQUE: Axial CT images obtained throughout the region of the head without the use of intravenous contrast. Axial, sagittal and coronal reconstructions were performed.    COMPARISON: CT head without contrast 5/23/2017 MRI brain 5/13/2017    FINDINGS:  The exam is limited by patient motion artifact.    There is cerebral volume loss, with diffuse ventricular hypoattenuation related to the patient's demyelinating disease.    There is an old lacunar infarct about the left internal capsule. No parenchymal mass, hemorrhage, edema or major vascular distribution infarct.    No extra-axial blood or fluid collections.    There is a small left posterior scalp hematoma, stable from prior. The cranium appears intact. Mastoid air cells and paranasal sinuses are essentially clear.                             MRI Brain W WO Contrast (Final result)     Abnormal  Result time 05/26/17 17:11:27    Final result by Hai Dove MD (05/26/17 17:11:27)                 Impression:      Please note evaluation is markedly limited by motion artifact, and the patient could not complete the exam.  However, the limited diffusion MRI findings are most compatible with acute infarction involving the right frontal and right temporal lobes as well as the left cerebellar hemisphere and left brainstem, as above.  No evidence of intracranial hemorrhage.    The impression was discussed with Dr. Urena by Dr. Benitez on behalf of Dr. Dove at 4:52 PM.      This report has been flagged in the [facility] medical record.    ______________________________________     Electronically signed by resident: AIRAM BENITEZ MD  Date:     05/26/17  Time:    17:11            As the supervising and teaching physician, I personally  reviewed the images and resident's interpretation and I agree with the findings.          Electronically signed by: ROSANGELA WREN MD  Date:     05/26/17  Time:    17:11              Narrative:    MRI brain with and without contrast.    05/26/17 16:07:49.    Accession# [id number].    CLINICAL INDICATION: 57 year old M with stroke.    TECHNIQUE: Multiplanar multisequence MR imaging of the brain was performed before and after the administration of intravenous contrast.     COMPARISON: CT head 5/23/2017 and brain MRI 5/13/2017.  FINDINGS:  Areas of restricted diffusion are noted bilaterally involving the right perisylvian frontal lobe, right anterior insular cortex, right temporal lobe, left brachium pontis, left anterior jose and left cerebellum, compatible with acute infarction.    The ventricles are stable in size and configuration without evidence of hydrocephalus.  Multiple foci demonstrating high T2/FLAIR signal intensity are noted scattered throughout the supratentorial matter, likely representing sequela of chronic microvascular ischemic changes.  Several chronic infarcts are noted.  There is no evidence of intracranial hemorrhage or abnormal postcontrast parenchymal enhancement allowing for limitation by motion artifact.    No definite extra-axial blood or fluid collections.     T2 skull base flow voids are preserved. Bone marrow signal intensity is unremarkable.                              Microbiology:  Microbiology Results (last 7 days)     Procedure Component Value Units Date/Time    Blood culture [[id number]] Collected:  05/26/17 1914    Order Status:  Completed Specimen:  Blood Updated:  05/27/17 0315     Blood Culture, Routine No Growth to date    Narrative:       Collection has been rescheduled by KM at 5/26/2017 16:42 Reason:   patient out for testing   Collection has been rescheduled by KM at 5/26/2017 16:42 Reason:   patient out for testing     Blood culture [[id number]] Collected:  05/26/17 1905     Order Status:  Completed Specimen:  Blood Updated:  05/27/17 0315     Blood Culture, Routine No Growth to date    Narrative:       Collection has been rescheduled by [email or web address] at 5/26/2017 16:42 Reason:   patient out for testing   Collection has been rescheduled by [email or web address] at 5/26/2017 16:42 Reason:   patient out for testing     Urine culture [[id number]]     Order Status:  Canceled Specimen:  Urine           ASSESSMENT/PLAN:     Active Hospital Problems    Diagnosis  POA    *Encephalopathy [G93.40]  Yes    Acute encephalopathy [G93.40]  Unknown    Leukocytosis [D72.829]  Yes    Diplopia [H53.2]  Yes    Multiple sclerosis [G35]  Yes    Fall [W19.XXXA]  Unknown    Syncope [R55]  Yes    Type 2 diabetes mellitus with diabetic polyneuropathy, with long-term current use of insulin [E11.42, Z79.4]  Not Applicable    Ataxia [R27.0]  Yes    Demyelinating changes in brain [G37.9]  Yes     By mri.  Several active lesions, many old.  5/13: MRA brain/neck, MRI brain w/wo contrast show no vascular occlusion, multiple foci of hyperintensity in deep cerebral periventricular white matter in pattern of demyelinating process.   5/17: MRI spine performed, no spinal cord lesions.     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  5/17: MRI spine performed, no spinal cord lesions.      NAFLD (nonalcoholic fatty liver disease) [K76.0]  Yes     Chronic    Type 2 diabetes mellitus with hyperglycemia, with long-term current use of insulin [E11.65, Z79.4]  Not Applicable    Obesity [E66.9]  Yes     Chronic    Sleep apnea [G47.30]  Yes     Chronic    Depressive disorder, not elsewhere classified [F32.9]  Yes     Chronic    Essential hypertension [I10]  Yes     Chronic    Mixed hyperlipidemia [E78.2]  Yes     Chronic      Resolved  Hospital Problems    Diagnosis Date Resolved POA   No resolved problems to display.       Syncope  Encephalopathy   - Most likely 2/2 dehydration/ hypovolemia in setting of hyperglycemia (especially on steroids). Other ddx includes weakness/miscoordination 2/2 newly diagnosed demyelinating disease, seizure (no history); stroke is less likely given negative head CT, cardiac arrhythmia less likely given normal EKG.   - Received 1L IVF at OSH. Will continue IVF, NS 150ml/hr x10hr.   - Fall precautions, seizure precautions.   - PT/OT given weakness.    - worsening encephalopathy 5/26: MRI findings are most compatible with acute infarction involving the right frontal and right temporal lobes as well as the left cerebellar hemisphere and left brainstem.  No evidence of intracranial hemorrhage.   -no acute signs of infection, ESR/CRP mildly elevated.  CT body ordered to evaluate for infection/malignancies.  Considering GIOVANNI.  -nursing reports dark stool, Hgb stable, ordered occult blood.  -holding sertraline.    - Appreciate stroke service recs:  Acute stroke vs demyelinating lesion, however, will proceed to treat as acute stroke.  Repeat MRI brain on 5/29 (will do on 5/29)  Repeat LP  Repeat MRI spine to look for spinal lesions (will do on 5/29)  Continue aspirin 81 and atorvastatin 40 (ordered)  SBP <220 (monitoring)  Sq heparin for DVT prophylaxis (being held for possible LP)  PT/OT and speech to evaluate and treat (ordered)  Neuro checks q2-4h (q2H)    - Appreciate neurology recs:   - EEG negative for seizures, per neuro. On prophylactic keppra.  - negative tests: abd US, hepatitis panel, WNV, C3, C4, lyme, HIV, REYMUNDO, anti-dsDNA, SPEP.  - positive tests: elevated retic, CRP, and ESR.  - pending tests: ANCA, cryoglobulin, heavy metals, B1, B6, B12, paraneoplastic autoantibody, encephalopathy autoimmune panel, UA  - Defer further steroid use to Neurology.   - leukocytosis could also indicate underlying process contributing  "to encephalopathy. F/up cultures and start empiric rocephin  - CT chest/abd/pelvis to evaluate for infection/malignancies  - possible GIOVANNI in future to rule out septic emboli to brain.  - LP pending     Type 2 diabetes mellitus with diabetic polyneuropathy, with long-term current use of insulin   - Hyperglycemic in setting of uncontrolled insulin dependent DM and steroid use.   - DM diet.   - Home regimen includes: tresiba 42U qhs, victoza 1.8mg once daily, metformin.   - Last hospitalization was on Detemir 32U qhs, and Aspart 22U TIDWM.   - titrating insulin, currently NPO due to encephalopathy and risk for aspiration     Diplopia   - Originally presenting symptom of demyelinating disease.   - Resolved.  - Diplopia following syncope may have been "unmasking" as opposed to acute neurologic problem.      Leukocytosis   - most likely 2/2 steroids, especially given no s/sx of infection.   - worsened 5/26 in setting of worsening encephalopathy  - BCx NGTD, UA pending, on empiric rocephin started 5/27.     Essential hypertension   - Cont home: ASA, Atenolol, Diltiazem, Valsartan.  - at goal, continue to monitor      Mixed hyperlipidemia   - Cont home Atorvastatin      Depressive disorder, not elsewhere classified   - holding zoloft     PT/OT: recommend outpatient PT/OT  Diet: NPO - risk of aspiration, SLP eval ordered  DVT PPx: hold chem dvt ppx for LP  Code Status: Full Code    Dispo:  Pending workup and treatment of encephalopathy.     Patient seen and discussed with Dr. Elvira Hendricks MD during rounds.    Guanaco Story MD  PGY-1  Pager: [phone]    "

## 2017-05-27 NOTE — HPI
Bessy Nunez is a 57 y.o. Male with a PMHx of HTN, DM, and demyelinating disease who was admitted to the stroke service about 2 weeks ago for diplopia, unsteady gait, and JAYDEN. Demyelinating disease was discovered and patient was transferred to hospital medicine with general neurology consult. He was discharged home on steroids. Patient returned to hospital after he had a fall and was nonverbal. Patient has been having intermittent aphasia, inattention, and delayed response. EEG was negative for seizures and labs WNL. LP from previous admission did not show evidence of MS. Repeat MRI revealed new lesions with one lesion in R frontal lobe concerning for stroke. Stroke team was consulted for further input.

## 2017-05-27 NOTE — PROGRESS NOTES
"Pt mental status still poor, during q2 neuro checks the pt not answering orientation questions properly. When asked questions such as "what is your name", "where are you", "do you know why you're here", and "who's that sitting next to you"-referring to his wife, the pt answers "[place]". Notified the CN Zoila ORTIZ. Pt also has Keppra PO ordered, but the pt is NPO and this RN is not sure if it is safe to give the pt any oral meds given his confused state. Paged the resident for IM3, stated she'll come up to assess the pt. Will continue to monitor.  "

## 2017-05-27 NOTE — PROGRESS NOTES
[facility]  Vascular Neurology  Comprehensive Stroke Center  Progress Note      Assessment/Plan:     56 y/o male with possible MS treated with steroids on last admission. Patient has been having intermittent aphasia, inattention, and delayed response. Repeat MRI revealed new lesions with one lesion in R frontal lobe concerning for stroke but very degrade by motion. Patient with no real focal neuro deficits, moving all extremities, flat affect, follows commands but has inattention and delayed response. Symptoms are waxing and waning.     * Encephalopathy    Patient with intermittent aphasia, inattention, and delayed response    Area in R frontal lobe concerning for possible new infarct. Patient has hyperdensity in DWI and correlating hypodensity in ADC. However this new lesion may reflect a new demyelinating lesion. Depending on stage of a demyelinating lesion, it could appear as an acute stroke on MRI. At this point, it is difficult to ascertain the cause of the R frontal lesion. Therefore, would treat as an acute stroke until proven otherwise.    Recommendations:  Repeat MRI brain in 2-3 days  Repeat LP  Repeat MRI spine to look for spinal lesions  Continue aspirin 81 and atorvastatin 40  SBP <220  Sq heparin for DVT prophylaxis  PT/OT and speech to evaluate and treat  Neuro checks q2-4h        Essential hypertension    Stroke risk factor  Allow permissive hypertension in setting of possible acute stroke  SBP <220          Mixed hyperlipidemia    Stroke risk factor  Continue atorvastatin 40        Type 2 diabetes mellitus with hyperglycemia, with long-term current use of insulin    Stroke risk factor  A1C 11.4 on 5/13/17  Continue aspart and detemir   on medication compliance prior to discharge            Neurologic Chief Complaint: abnormal MRI    Subjective:     Interval History: Patient is seen for follow-up neurological assessment and treatment recommendations: No issue overnight  still with waxing and waning symptoms    HPI, Past Medical, Family, and Social History remains the same as documented in the initial encounter.     Review of Systems   Constitutional: Negative for chills and fever.   Respiratory: Negative for cough and shortness of breath.    Neurological: Positive for speech difficulty.        Inattention, delayed response   Psychiatric/Behavioral:        Flat affect     Scheduled Meds:   aspirin  81 mg Oral Daily    atorvastatin  40 mg Oral Daily    cefTRIAXone (ROCEPHIN) IVPB  1 g Intravenous Q24H    diltiaZEM  240 mg Oral Daily    insulin aspart  18 Units Subcutaneous TIDWM    insulin detemir  40 Units Subcutaneous QHS    levetiracetam IVPB  1,000 mg Intravenous Q12H    sodium chloride 0.9%  1,000 mL Intravenous Once    vitamin D  5,000 Units Oral Daily     Continuous Infusions:   sodium chloride 0.9%       PRN Meds:dextrose 50%, dextrose 50%, glucagon (human recombinant), glucose, glucose, insulin aspart, omnipaque    Objective:     Vital Signs (Most Recent):  Temp: 98.2 °F (36.8 °C) (05/27/17 1400)  Pulse: 67 (05/27/17 1400)  Resp: 18 (05/27/17 1400)  BP: (!) 114/48 (p 1L bolus) (05/27/17 1400)  SpO2: 100 % (05/27/17 1400)  BP Location: Right arm    Vital Signs Range (Last 24H):  Temp:  [97.8 °F (36.6 °C)-98.5 °F (36.9 °C)]   Pulse:  [62-76]   Resp:  [15-20]   BP: ()/(46-65)   SpO2:  [96 %-100 %]   BP Location: Right arm    Physical Exam   Constitutional: He appears well-developed and well-nourished.   HENT:   Head: Normocephalic.   Eyes:   L JAYDEN   Neurological: He is alert.   Inattention, delayed responses, aphasia waxing and waning   Nursing note and vitals reviewed.      Neurological Exam:   LOC: alert and follows requests  Language: No aphasia  Speech: No dysarthria  Orientation: Person, Place, Time  Motor*: Arm Left:  Normal (5/5), Leg Left:   Normal (5/5), Arm Right:   Normal (5/5), Leg Right:   Normal (5/5)  Tone: Arm-Left: normal; Leg-Left: normal;  Arm-Right: normal; Leg-Right: normal    NIH Stroke Scale:    Level of Consciousness: 0 - alert  LOC Questions: 1 - answers one correctly  LOC Commands: 0 - performs both correctly  Best Gaze: 0 - normal  Visual: 1 - partial hemianopia  Facial Palsy: 0 - normal  Motor Left Arm: 0 - no drift  Motor Right Arm: 0 - no drift  Motor Left Le - no drift  Motor Right Le - no drift  Limb Ataxia: 0 - absent  Sensory: 0 - normal  Best Language: 0 - no aphasia  Dysarthria: 0 - normal articulation  Extinction and Inattention: 0 - no neglect  NIH Stroke Scale Total: 2      Laboratory:  CMP:   Recent Labs  Lab 17  0500   CALCIUM 7.8*   ALBUMIN 2.4*   PROT 4.9*      K 3.9   CO2 25      BUN 18   CREATININE 0.8   ALKPHOS 90   ALT 57*   AST 40   BILITOT 0.5     CBC:   Recent Labs  Lab 17  0500   WBC 12.36   RBC 2.46*   HGB 7.2*   HCT 21.2*      MCV 86   MCH 29.3   MCHC 34.0     Lipid Panel: No results for input(s): CHOL, LDLCALC, HDL, TRIG in the last 168 hours.  Coagulation:   Recent Labs  Lab 17  1057   INR 1.0   APTT <21.0     Platelet Aggregation Study: No results for input(s): PLTAGG, PLTAGINTERP, PLTAGREGLACO, ADPPLTAGGREG in the last 168 hours.  Hgb A1C: No results for input(s): HGBA1C in the last 168 hours.  TSH:   Recent Labs  Lab 17  0626   TSH 1.786       Diagnostic Results:  MRI Head. Date: 17  Please note evaluation is markedly limited by motion artifact, and the patient could not complete the exam.  However, the limited diffusion MRI findings are most compatible with acute infarction involving the right frontal and right temporal lobes as well as the left cerebellar hemisphere and left brainstem, as above.  No evidence of intracranial hemorrhage.     Echo. Date: 17  -EF 65%  -mild LAE      Jane Vidal NP  [facility]  Department of Vascular Neurology   [facility]

## 2017-05-27 NOTE — ASSESSMENT & PLAN NOTE
Patient demonstrates waxing and waning of mental status throughout the day. MRI findings concerning for ischemic event vs active demyelination, with ADC correlation of DWI abnormality. MRI shows lesions suggestive of infarct in the R frontal and temporal lobes as well as the left cerebellar hemisphere and left brainstem, correlating with multiple vascular territories. Will need to rule out toxic metabolic etiologies. Cultures thus far have been negative. Repeat UA negative.  Vascular Neurology is following. Please consider GIOVANNI as cardio-embolic phenomenon can present with ischemic events in multiple vascular territories. Will also obtain addition labs for auto-immune causes of clinical presentation.     F/u  anti-Phospholipid ab/cardiolipin ab  Decrease keppra to 500mg bid  GIOVANNI, as above  F/u vascular neurology recs.  Consider repeat MRI Brain today/tomorrow    F/u lyme titer (neg), WNV (neg), SSA (neg) , SSB (neg), dsDNA, vasculitis labs, ESR 15, CRP 5.37, HIV neg, REYMUNDO, as well as an autoimmune encephalopathy panel    24h EEG 5/26 negative for simple partial seizures / underlying etiology of waxing and waning MS

## 2017-05-27 NOTE — PROGRESS NOTES
Spoke with Dr. Story re: patient's most recent BP 97/60. Previous -114/50-58 this am. Patient's wife still verbalizing concerns about low BP. Also spoke with MD re: SLP consult and findings. Patient more communicative at this time - able to state his name and , his wife's name, and the year. However, also states he thinks he is in [place]. Awaiting new orders for IVF. Will continue to monitor.

## 2017-05-27 NOTE — SUBJECTIVE & OBJECTIVE
Subjective:     Interval History: Patient continues to wax and wane. Concern for R sided weakness earlier this morning; resolved for my exam.      Current Neurological Medications: Keppra     Current Facility-Administered Medications   Medication Dose Route Frequency Provider Last Rate Last Dose    aspirin EC tablet 81 mg  81 mg Oral Daily Donna Jason MD   81 mg at 05/26/17 0830    atenolol tablet 50 mg  50 mg Oral Daily Donna Jason MD   50 mg at 05/26/17 0829    atorvastatin tablet 40 mg  40 mg Oral Daily Donna aJson MD   40 mg at 05/26/17 0828    cefTRIAXone (ROCEPHIN) 1 g in dextrose 5 % 50 mL IVPB  1 g Intravenous Q24H Josefina Urena MD   1 g at 05/26/17 2223    dextrose 50% injection 12.5 g  12.5 g Intravenous PRN Donna Jason MD        dextrose 50% injection 25 g  25 g Intravenous PRN Donna Jason MD        diltiaZEM 24 hr capsule 240 mg  240 mg Oral Daily Donna Jason MD   240 mg at 05/26/17 0828    glucagon (human recombinant) injection 1 mg  1 mg Intramuscular PRN Donna Jason MD        glucose chewable tablet 16 g  16 g Oral PRN Donna Jason MD        glucose chewable tablet 24 g  24 g Oral PRN Donna Jason MD        insulin aspart pen 0-5 Units  0-5 Units Subcutaneous QID (AC + HS) PRN Donna Jason MD   2 Units at 05/26/17 0839    insulin aspart pen 18 Units  18 Units Subcutaneous TIDWM Josefina Urena MD   18 Units at 05/26/17 1232    insulin detemir pen 40 Units  40 Units Subcutaneous QHS Josefina Urena MD   40 Units at 05/26/17 2215    levetiracetam in NaCl (iso-os) IVPB 1,000 mg  1,000 mg Intravenous Q12H Michelle Jhaveri  mL/hr at 05/27/17 0955 1,000 mg at 05/27/17 0955    valsartan tablet 320 mg  320 mg Oral Daily Donna Jason MD   320 mg at 05/26/17 0837    vitamin D 1000 units tablet 5,000  Units  5,000 Units Oral Daily Guanaco Story MD   5,000 Units at 05/26/17 0846       Review of Systems   Constitutional: Positive for activity change.   Respiratory: Negative for shortness of breath.    Cardiovascular: Negative for chest pain.   Gastrointestinal: Negative for nausea and vomiting.   Skin: Negative for rash and wound.   Neurological: Positive for headaches.        Periods of decreased responsiveness    Psychiatric/Behavioral: Positive for decreased concentration. Negative for agitation and behavioral problems.     Objective:     Vital Signs (Most Recent):  Temp: 98.1 °F (36.7 °C) (05/27/17 1105)  Pulse: 76 (05/27/17 1105)  Resp: 20 (05/27/17 0943)  BP: 97/60 (05/27/17 1105)  SpO2: 100 % (05/27/17 1105) Vital Signs (24h Range):  Temp:  [97.8 °F (36.6 °C)-98.5 °F (36.9 °C)] 98.1 °F (36.7 °C)  Pulse:  [62-92] 76  Resp:  [15-20] 20  SpO2:  [96 %-100 %] 100 %  BP: ()/(46-65) 97/60     Weight: 118.4 kg (261 lb 0.4 oz)  Body mass index is 36.41 kg/m².    Physical Exam   Constitutional: He appears well-developed and well-nourished. No distress.   HENT:   Head: Normocephalic and atraumatic.   Pulmonary/Chest: Effort normal.   Neurological: He is alert.   Skin: Skin is warm and dry. He is not diaphoretic.   Psychiatric: His speech is normal.   Slightly withdrawn.  Less expressive than last admission.  Paucity of speech.   Nursing note and vitals reviewed.      NEUROLOGICAL EXAMINATION:     MENTAL STATUS   Attention: normal.   Speech: speech is normal   Level of consciousness: alert    CRANIAL NERVES     CN II   Visual fields full to confrontation.     CN V   Facial sensation intact.     CN VII   Facial expression full, symmetric.     CN VIII   Hearing: intact    CN XI   Right sternocleidomastoid strength: normal  Left sternocleidomastoid strength: normal    CN XII   Tongue: not atrophic  Fasciculations: absent  Tongue deviation: none       Mild L JAYDEN.  Improving.     MOTOR EXAM   Muscle bulk:  normal  Overall muscle tone: normal       More difficulty following commands today.  Perseverates.  Moves all extremities against gravity.     SENSORY EXAM   Light touch normal.       Significant Labs:   Hemoglobin A1c:   Recent Labs  Lab 05/13/17  0138   HGBA1C 11.4*     Blood Culture:   Recent Labs  Lab 05/26/17 1905 05/26/17 1914   LABBLOO No Growth to date No Growth to date     BMP:   Recent Labs  Lab 05/26/17  0556 05/26/17  1502 05/27/17  0500   * 95 88    140 140   K 4.3 4.0 3.9    108 109   CO2 26 25 25   BUN 26* 23* 18   CREATININE 0.9 0.9 0.8   CALCIUM 8.0* 8.4* 7.8*   MG  --  1.6  --      CBC:   Recent Labs  Lab 05/26/17  1502 05/26/17 1905 05/27/17  0500   WBC 19.09* 14.22* 12.36   HGB 8.1* 7.3* 7.2*   HCT 22.9* 20.9* 21.2*    153 155     CMP:   Recent Labs  Lab 05/26/17  0556 05/26/17  1502 05/27/17  0500   * 95 88    140 140   K 4.3 4.0 3.9    108 109   CO2 26 25 25   BUN 26* 23* 18   CREATININE 0.9 0.9 0.8   CALCIUM 8.0* 8.4* 7.8*   MG  --  1.6  --    PROT 4.5* 5.1* 4.9*   ALBUMIN 2.2* 2.5* 2.4*   BILITOT 0.4 0.5 0.5   ALKPHOS 91 97 90   AST 55* 43* 40   ALT 67* 66* 57*   ANIONGAP 6* 7* 6*   EGFRNONAA >60.0 >60.0 >60.0     CSF Culture: No results for input(s): CSFCULTURE in the last 48 hours.  CSF Studies: No results for input(s): ALIQUT, APPEARCSF, COLORCSF, CSFWBC, CSFRBC, GLUCCSF, LDHCSF, PROTEINCSF, VDRLCSF in the last 48 hours.  Inflammatory Markers: No results for input(s): SEDRATE, CRP, PROCAL in the last 48 hours.  POCT Glucose:   Recent Labs  Lab 05/26/17  1427 05/26/17  1810 05/26/17  2214   POCTGLUCOSE 103 126* 152*     Respiratory Culture: No results for input(s): GSRESP, RESPIRATORYC in the last 48 hours.  Urine Culture: No results for input(s): LABURIN in the last 48 hours.  Urine Studies: No results for input(s): COLORU, APPEARANCEUA, PHUR, SPECGRAV, PROTEINUA, GLUCUA, KETONESU, BILIRUBINUA, OCCULTUA, NITRITE, UROBILINOGEN, LEUKOCYTESUR,  RBCUA, WBCUA, BACTERIA, SQUAMEPITHEL, HYALINECASTS in the last 48 hours.    Invalid input(s): GUS  Recent Lab Results       05/27/17  0926 05/27/17  0500 05/27/17  0009 05/26/17  2214 05/26/17  1914      Albumin  2.4(L)        Alkaline Phosphatase  90        Allens Test          ALT  57(H)        Ammonia   24       Anion Gap  6(L)        AST  40        Baso #  0.01        Basophil%  0.1        Total Bilirubin  0.5  Comment:  For infants and newborns, interpretation of results should be based  on gestational age, weight and in agreement with clinical  observations.  Premature Infant recommended reference ranges:  Up to 24 hours.............<8.0 mg/dL  Up to 48 hours............<12.0 mg/dL  3-5 days..................<15.0 mg/dL  6-29 days.................<15.0 mg/dL          Blood Culture, Routine     No Growth to date[P]     Site          BUN, Bld  18        Calcium  7.8(L)        Chloride  109        CO2  25        CPK          Creatinine  0.8        DelSys          Differential Method  Automated        eGFR if   >60.0        eGFR if non   >60.0  Comment:  Calculation used to obtain the estimated glomerular filtration  rate (eGFR) is the CKD-EPI equation. Since race is unknown   in our information system, the eGFR values for   -American and Non--American patients are given   for each creatinine result.          Eos #  0.1        Eosinophil%  0.6        Ferritin          Glucose  88        Gran #  9.1(H)        Gran%  73.2(H)        Hematocrit  21.2(L)        Hemoglobin  7.2(L)        Iron          Lymph #  2.3        Lymph%  18.8        Magnesium          MCH  29.3        MCHC  34.0        MCV  86        Mode          Mono #  0.7        Mono%  5.9        MPV  9.1(L)        Pathologist Review  Review required        Phosphorus          Platelet Estimate          Platelets  155        POC BE          POC HCO3          POC Lactate          POC PCO2          POC PH           POC PO2          POC SATURATED O2          POC TCO2          POCT Glucose    152(H)      Poly          Potassium  3.9        Total Protein  4.9(L)        Protein, Serum          RBC  2.46(L)        RDW  14.1        Retic          Sample          Saturated Iron          Sodium  140        TIBC          Transferrin          Vitamin B-12 907         WBC  12.36                    05/26/17  1905 05/26/17  1810 05/26/17  1502 05/26/17  1449 05/26/17  1427      Albumin   2.5(L)       Alkaline Phosphatase   97       Allens Test    Pass      ALT   66(H)       Ammonia          Anion Gap   7(L)       AST   43(H)       Baso # 0.01  CANCELED  Comment:  Result canceled by the ancillary       Basophil% 0.1  0.0       Total Bilirubin   0.5  Comment:  For infants and newborns, interpretation of results should be based  on gestational age, weight and in agreement with clinical  observations.  Premature Infant recommended reference ranges:  Up to 24 hours.............<8.0 mg/dL  Up to 48 hours............<12.0 mg/dL  3-5 days..................<15.0 mg/dL  6-29 days.................<15.0 mg/dL         Blood Culture, Routine No Growth to date[P]         Site    RR      BUN, Bld   23(H)       Calcium   8.4(L)       Chloride   108       CO2   25       CPK          Creatinine   0.9       DelSys    Room Air      Differential Method Automated  Manual       eGFR if    >60.0       eGFR if non    >60.0  Comment:  Calculation used to obtain the estimated glomerular filtration  rate (eGFR) is the CKD-EPI equation. Since race is unknown   in our information system, the eGFR values for   -American and Non--American patients are given   for each creatinine result.         Eos # 0.1  CANCELED  Comment:  Result canceled by the ancillary       Eosinophil% 0.5  0.0       Ferritin          Glucose   95       Gran # 10.2(H)         Gran% 71.9  67.0       Hematocrit 20.9(L)  22.9(L)       Hemoglobin 7.3(L)   8.1(L)       Iron          Lymph # 2.8  CANCELED  Comment:  Result canceled by the ancillary       Lymph% 19.6  30.0       Magnesium   1.6       MCH 29.6  30.0       MCHC 34.9  35.4       MCV 85  85       Mode    SPONT      Mono # 0.8  CANCELED  Comment:  Result canceled by the ancillary       Mono% 5.9  3.0(L)       MPV 8.8(L)  8.9(L)       Pathologist Review          Phosphorus   3.9       Platelet Estimate   Appears normal       Platelets 153  215       POC BE    -1      POC HCO3    24.1      POC Lactate    1.21      POC PCO2    38.3      POC PH    7.406      POC PO2    70(L)      POC SATURATED O2    94(L)      POC TCO2    25      POCT Glucose  126(H)   103     Poly   Occasional       Potassium   4.0       Total Protein   5.1(L)       Protein, Serum          RBC 2.47(L)  2.70(L)       RDW 13.8  13.9       Retic          Sample    ARTERIAL      Saturated Iron          Sodium   140       TIBC          Transferrin          Vitamin B-12          WBC 14.22(H)  19.09(H)                   05/26/17  1214 05/26/17  1138      Albumin       Alkaline Phosphatase       Allens Test       ALT       Ammonia       Anion Gap       AST       Baso #  0.01     Basophil%  0.1     Total Bilirubin       Blood Culture, Routine       Site       BUN, Bld       Calcium       Chloride       CO2       CPK  18(L)     Creatinine       DelSys       Differential Method  Automated     eGFR if        eGFR if non        Eos #  0.1     Eosinophil%  0.8     Ferritin  284     Glucose       Gran #  9.1(H)     Gran%  68.7     Hematocrit  21.4(L)     Hemoglobin  7.4(L)     Iron  66     Lymph #  3.2     Lymph%  24.2     Magnesium       MCH  29.4     MCHC  34.6     MCV  85     Mode       Mono #  0.6     Mono%  4.3     MPV  9.9     Pathologist Review  Review required     Phosphorus       Platelet Estimate       Platelets  121(L)     POC BE       POC HCO3       POC Lactate       POC PCO2       POC PH       POC PO2       POC  SATURATED O2       POC TCO2       POCT Glucose 176(H)      Poly       Potassium       Total Protein       Protein, Serum  4.6(L)     RBC  2.52(L)     RDW  13.9     Retic  3.5(H)     Sample       Saturated Iron  22     Sodium       TIBC  300     Transferrin  203     Vitamin B-12       WBC  13.28(H)           Significant Imaging:  MRI brain 5/13/17:  Multiple foci of FLAIR hyperintensity in the deep cerebral periventricular white matter in a configuration and distribution which can be seen in the setting of underlying demyelinating process such as multiple sclerosis. There are several punctate foci of restricted diffusion including the left aspect of the midbrain tectum which in light of the aforementioned white matter changes may reflect regions of recent demyelination. Superimposed small acute infarcts would be difficult to exclude, although are felt less likely given the overall constellation of findings. Additionally, there is a 0.7 cm enhancing T2/FLAIR identified in the anterior right thalamus concerning for focus of active demyelination.           Also old lesions in corpus collosum         CTH 5/26/17  No detrimental change in this patient with volume loss, and multiple areas of hyperattenuation consistent with a infarcts, and periventricular hypoattenuation likely related to the patient's demyelinating disease.    MRI Brain 5/26/17  Please note evaluation is markedly limited by motion artifact, and the patient could not complete the exam.  However, the limited diffusion MRI findings are most compatible with acute infarction involving the right frontal and right temporal lobes as well as the left cerebellar hemisphere and left brainstem, as above.  No evidence of intracranial hemorrhage.

## 2017-05-27 NOTE — ASSESSMENT & PLAN NOTE
Resolving. Currently on Rocephin. Could also be steroid effect.  Consider further evaluation of infection with repeat urine studies and pan scan.

## 2017-05-27 NOTE — ASSESSMENT & PLAN NOTE
Patient with waxing and waning of mental status throughout the day. MRI findings concerning for ischemic event vs active demyelination. There is ADC correlation of DWI abnormality. MRI showing R frontal and right temporal lobes as well as the left cerebellar hemisphere and left brainstem. Multiple vascular territories. Will certainly need to rule out toxic metabolic etiologies. Cultures thus far have been negative. Will need to repeat urine studies. Vascular Neurology is following. Please consider GIOVANNI as cardio-embolic phenomenon can present with ischemic events in multiple vascular territories. Will also obtain addition labs for auto-immune causes of clinical presentation.     I have ordered anti-Phospholipid ab/cardiolipin ab  Please consider GIOVANNI  Repeat U/A and uCx  Consider pan scan to rule out infection/abscess     F/u lyme titer (neg), WNV (neg), SSA (neg) , SSB (neg), dsDNA, vasculitis labs, ESR 15, CRP 5.37, HIV neg, REYMUNDO, as well as an autoimmune encephalopathy panel    F/u 24h EEG to assess for simple partial seizures / underlying etiology of waxing and waning MS

## 2017-05-27 NOTE — PROGRESS NOTES
[facility]  Neurology  Progress Note    Patient Name: Bessy Nunez  MRN: [id number]  Admission Date: 5/23/2017  Hospital Length of Stay: 4 days  Code Status: Full Code   Attending Provider: Elvira Hendricks MD  Primary Care Physician: Edgar Nova MD   Principal Problem:Encephalopathy      Subjective:     Interval History: Patient continues to wax and wane. There are periods where patient verbally interacts and is able to answers questions and folow 2 step commands. Patient will (within minutes) not follow any commands and become non-verbal, but continue to track me across the room.     Current Neurological Medications: Keppra     Current Facility-Administered Medications   Medication Dose Route Frequency Provider Last Rate Last Dose    aspirin EC tablet 81 mg  81 mg Oral Daily Donna Jason MD   81 mg at 05/26/17 0830    atenolol tablet 50 mg  50 mg Oral Daily Donna Jason MD   50 mg at 05/26/17 0829    atorvastatin tablet 40 mg  40 mg Oral Daily Donna Jason MD   40 mg at 05/26/17 0828    cefTRIAXone (ROCEPHIN) 1 g in dextrose 5 % 50 mL IVPB  1 g Intravenous Q24H Josefina Urena MD   1 g at 05/26/17 2223    dextrose 50% injection 12.5 g  12.5 g Intravenous PRN Donna Jason MD        dextrose 50% injection 25 g  25 g Intravenous PRN Donna Jason MD        diltiaZEM 24 hr capsule 240 mg  240 mg Oral Daily Donna Jason MD   240 mg at 05/26/17 0828    glucagon (human recombinant) injection 1 mg  1 mg Intramuscular PRN Donna Jason MD        glucose chewable tablet 16 g  16 g Oral PRN Donna Jason MD        glucose chewable tablet 24 g  24 g Oral PRN Donna Jason MD        insulin aspart pen 0-5 Units  0-5 Units Subcutaneous QID (AC + HS) PRN Donna Jason MD   2 Units at 05/26/17 0839    insulin aspart pen 18 Units  18 Units Subcutaneous TIDWM Josefina COLBERT  MD Romel   18 Units at 05/26/17 1232    insulin detemir pen 40 Units  40 Units Subcutaneous QHS Josefina COLBERT MD Romel   40 Units at 05/26/17 2215    levetiracetam in NaCl (iso-os) IVPB 1,000 mg  1,000 mg Intravenous Q12H Michelle Jhaveri  mL/hr at 05/27/17 0955 1,000 mg at 05/27/17 0955    valsartan tablet 320 mg  320 mg Oral Daily Donna Jason MD   320 mg at 05/26/17 0837    vitamin D 1000 units tablet 5,000 Units  5,000 Units Oral Daily Guanaco Story MD   5,000 Units at 05/26/17 0846       Review of Systems   Constitutional: Positive for activity change.   Respiratory: Negative for shortness of breath.    Cardiovascular: Negative for chest pain.   Gastrointestinal: Negative for nausea and vomiting.   Skin: Negative for rash and wound.   Neurological: Negative for weakness.        Periods of decreased responsiveness    Psychiatric/Behavioral: Positive for decreased concentration. Negative for agitation and behavioral problems.     Objective:     Vital Signs (Most Recent):  Temp: 98.1 °F (36.7 °C) (05/27/17 1105)  Pulse: 76 (05/27/17 1105)  Resp: 20 (05/27/17 0943)  BP: 97/60 (05/27/17 1105)  SpO2: 100 % (05/27/17 1105) Vital Signs (24h Range):  Temp:  [97.8 °F (36.6 °C)-98.5 °F (36.9 °C)] 98.1 °F (36.7 °C)  Pulse:  [62-92] 76  Resp:  [15-20] 20  SpO2:  [96 %-100 %] 100 %  BP: ()/(46-65) 97/60     Weight: 118.4 kg (261 lb 0.4 oz)  Body mass index is 36.41 kg/m².    Physical Exam   Constitutional: He is oriented to person, place, and time. He appears well-developed and well-nourished. No distress.   HENT:   Head: Normocephalic and atraumatic.   Pulmonary/Chest: Effort normal.   Neurological: He is alert and oriented to person, place, and time. He has a normal Finger-Nose-Finger Test.   Skin: Skin is warm and dry. He is not diaphoretic.   Psychiatric: His speech is normal.   Slightly withdrawn.  Less expressive than last admission.  Paucity of speech.    Nursing note and vitals reviewed.      NEUROLOGICAL EXAMINATION:     MENTAL STATUS   Oriented to person, place, and time.   Attention: normal.   Speech: speech is normal   Level of consciousness: alert    CRANIAL NERVES     CN II   Visual fields full to confrontation.     CN V   Facial sensation intact.     CN VII   Facial expression full, symmetric.     CN VIII   Hearing: intact    CN XI   Right sternocleidomastoid strength: normal  Left sternocleidomastoid strength: normal    CN XII   Tongue: not atrophic  Fasciculations: absent  Tongue deviation: none       Mild L JAYDEN.  Improving.     MOTOR EXAM   Muscle bulk: normal  Overall muscle tone: normal    Strength   Right biceps: 5/5  Left biceps: 5/5  Right triceps: 5/5  Left triceps: 5/5  Right quadriceps: 5/5  Left quadriceps: 5/5    SENSORY EXAM   Light touch normal.     GAIT AND COORDINATION      Coordination   Finger to nose coordination: normal      Significant Labs:   Hemoglobin A1c:   Recent Labs  Lab 05/13/17  0138   HGBA1C 11.4*     Blood Culture:   Recent Labs  Lab 05/26/17  1905 05/26/17  1914   LABBLOO No Growth to date No Growth to date     BMP:   Recent Labs  Lab 05/26/17  0556 05/26/17  1502 05/27/17  0500   * 95 88    140 140   K 4.3 4.0 3.9    108 109   CO2 26 25 25   BUN 26* 23* 18   CREATININE 0.9 0.9 0.8   CALCIUM 8.0* 8.4* 7.8*   MG  --  1.6  --      CBC:   Recent Labs  Lab 05/26/17  1502 05/26/17  1905 05/27/17  0500   WBC 19.09* 14.22* 12.36   HGB 8.1* 7.3* 7.2*   HCT 22.9* 20.9* 21.2*    153 155     CMP:   Recent Labs  Lab 05/26/17  0556 05/26/17  1502 05/27/17  0500   * 95 88    140 140   K 4.3 4.0 3.9    108 109   CO2 26 25 25   BUN 26* 23* 18   CREATININE 0.9 0.9 0.8   CALCIUM 8.0* 8.4* 7.8*   MG  --  1.6  --    PROT 4.5* 5.1* 4.9*   ALBUMIN 2.2* 2.5* 2.4*   BILITOT 0.4 0.5 0.5   ALKPHOS 91 97 90   AST 55* 43* 40   ALT 67* 66* 57*   ANIONGAP 6* 7* 6*   EGFRNONAA >60.0 >60.0 >60.0     CSF  Culture: No results for input(s): CSFCULTURE in the last 48 hours.  CSF Studies: No results for input(s): ALIQUT, APPEARCSF, COLORCSF, CSFWBC, CSFRBC, GLUCCSF, LDHCSF, PROTEINCSF, VDRLCSF in the last 48 hours.  Inflammatory Markers: No results for input(s): SEDRATE, CRP, PROCAL in the last 48 hours.  POCT Glucose:   Recent Labs  Lab 05/26/17  1427 05/26/17  1810 05/26/17  2214   POCTGLUCOSE 103 126* 152*     Respiratory Culture: No results for input(s): GSRESP, RESPIRATORYC in the last 48 hours.  Urine Culture: No results for input(s): LABURIN in the last 48 hours.  Urine Studies: No results for input(s): COLORU, APPEARANCEUA, PHUR, SPECGRAV, PROTEINUA, GLUCUA, KETONESU, BILIRUBINUA, OCCULTUA, NITRITE, UROBILINOGEN, LEUKOCYTESUR, RBCUA, WBCUA, BACTERIA, SQUAMEPITHEL, HYALINECASTS in the last 48 hours.    Invalid input(s): WRIGHTSUR  Recent Lab Results       05/27/17  0926 05/27/17  0500 05/27/17  0009 05/26/17  2214 05/26/17  1914      Albumin  2.4(L)        Alkaline Phosphatase  90        Allens Test          ALT  57(H)        Ammonia   24       Anion Gap  6(L)        AST  40        Baso #  0.01        Basophil%  0.1        Total Bilirubin  0.5  Comment:  For infants and newborns, interpretation of results should be based  on gestational age, weight and in agreement with clinical  observations.  Premature Infant recommended reference ranges:  Up to 24 hours.............<8.0 mg/dL  Up to 48 hours............<12.0 mg/dL  3-5 days..................<15.0 mg/dL  6-29 days.................<15.0 mg/dL          Blood Culture, Routine     No Growth to date[P]     Site          BUN, Bld  18        Calcium  7.8(L)        Chloride  109        CO2  25        CPK          Creatinine  0.8        DelSys          Differential Method  Automated        eGFR if   >60.0        eGFR if non   >60.0  Comment:  Calculation used to obtain the estimated glomerular filtration  rate (eGFR) is the CKD-EPI  equation. Since race is unknown   in our information system, the eGFR values for   -American and Non--American patients are given   for each creatinine result.          Eos #  0.1        Eosinophil%  0.6        Ferritin          Glucose  88        Gran #  9.1(H)        Gran%  73.2(H)        Hematocrit  21.2(L)        Hemoglobin  7.2(L)        Iron          Lymph #  2.3        Lymph%  18.8        Magnesium          MCH  29.3        MCHC  34.0        MCV  86        Mode          Mono #  0.7        Mono%  5.9        MPV  9.1(L)        Pathologist Review  Review required        Phosphorus          Platelet Estimate          Platelets  155        POC BE          POC HCO3          POC Lactate          POC PCO2          POC PH          POC PO2          POC SATURATED O2          POC TCO2          POCT Glucose    152(H)      Poly          Potassium  3.9        Total Protein  4.9(L)        Protein, Serum          RBC  2.46(L)        RDW  14.1        Retic          Sample          Saturated Iron          Sodium  140        TIBC          Transferrin          Vitamin B-12 907         WBC  12.36                    05/26/17  1905 05/26/17  1810 05/26/17  1502 05/26/17  1449 05/26/17  1427      Albumin   2.5(L)       Alkaline Phosphatase   97       Allens Test    Pass      ALT   66(H)       Ammonia          Anion Gap   7(L)       AST   43(H)       Baso # 0.01  CANCELED  Comment:  Result canceled by the ancillary       Basophil% 0.1  0.0       Total Bilirubin   0.5  Comment:  For infants and newborns, interpretation of results should be based  on gestational age, weight and in agreement with clinical  observations.  Premature Infant recommended reference ranges:  Up to 24 hours.............<8.0 mg/dL  Up to 48 hours............<12.0 mg/dL  3-5 days..................<15.0 mg/dL  6-29 days.................<15.0 mg/dL         Blood Culture, Routine No Growth to date[P]         Site    RR      BUN, Bld   23(H)        Calcium   8.4(L)       Chloride   108       CO2   25       CPK          Creatinine   0.9       DelSys    Room Air      Differential Method Automated  Manual       eGFR if    >60.0       eGFR if non    >60.0  Comment:  Calculation used to obtain the estimated glomerular filtration  rate (eGFR) is the CKD-EPI equation. Since race is unknown   in our information system, the eGFR values for   -American and Non--American patients are given   for each creatinine result.         Eos # 0.1  CANCELED  Comment:  Result canceled by the ancillary       Eosinophil% 0.5  0.0       Ferritin          Glucose   95       Gran # 10.2(H)         Gran% 71.9  67.0       Hematocrit 20.9(L)  22.9(L)       Hemoglobin 7.3(L)  8.1(L)       Iron          Lymph # 2.8  CANCELED  Comment:  Result canceled by the ancillary       Lymph% 19.6  30.0       Magnesium   1.6       MCH 29.6  30.0       MCHC 34.9  35.4       MCV 85  85       Mode    SPONT      Mono # 0.8  CANCELED  Comment:  Result canceled by the ancillary       Mono% 5.9  3.0(L)       MPV 8.8(L)  8.9(L)       Pathologist Review          Phosphorus   3.9       Platelet Estimate   Appears normal       Platelets 153  215       POC BE    -1      POC HCO3    24.1      POC Lactate    1.21      POC PCO2    38.3      POC PH    7.406      POC PO2    70(L)      POC SATURATED O2    94(L)      POC TCO2    25      POCT Glucose  126(H)   103     Poly   Occasional       Potassium   4.0       Total Protein   5.1(L)       Protein, Serum          RBC 2.47(L)  2.70(L)       RDW 13.8  13.9       Retic          Sample    ARTERIAL      Saturated Iron          Sodium   140       TIBC          Transferrin          Vitamin B-12          WBC 14.22(H)  19.09(H)                   05/26/17  1214 05/26/17  1138      Albumin       Alkaline Phosphatase       Allens Test       ALT       Ammonia       Anion Gap       AST       Baso #  0.01     Basophil%  0.1     Total  Bilirubin       Blood Culture, Routine       Site       BUN, Bld       Calcium       Chloride       CO2       CPK  18(L)     Creatinine       DelSys       Differential Method  Automated     eGFR if        eGFR if non        Eos #  0.1     Eosinophil%  0.8     Ferritin  284     Glucose       Gran #  9.1(H)     Gran%  68.7     Hematocrit  21.4(L)     Hemoglobin  7.4(L)     Iron  66     Lymph #  3.2     Lymph%  24.2     Magnesium       MCH  29.4     MCHC  34.6     MCV  85     Mode       Mono #  0.6     Mono%  4.3     MPV  9.9     Pathologist Review  Review required     Phosphorus       Platelet Estimate       Platelets  121(L)     POC BE       POC HCO3       POC Lactate       POC PCO2       POC PH       POC PO2       POC SATURATED O2       POC TCO2       POCT Glucose 176(H)      Poly       Potassium       Total Protein       Protein, Serum  4.6(L)     RBC  2.52(L)     RDW  13.9     Retic  3.5(H)     Sample       Saturated Iron  22     Sodium       TIBC  300     Transferrin  203     Vitamin B-12       WBC  13.28(H)           Significant Imaging: I have reviewed all pertinent imaging results/findings within the past 24 hours.    Assessment and Plan:     Fall    24 hr EEG completed- non-focal slowing- indicative of encephalopathy         Diplopia    2/2 JAYDEN  Improving        Leukocytosis    Resolving. Currently on Rocephin. Could also be steroid effect.  Consider further evaluation of infection with repeat urine studies and pan scan.        Demyelinating changes in brain    As seen on MRIs from 5/13  S/p 5.5g solumedrol last admission  Discussed with MS team - will need outpatient f/u appointment        Internuclear ophthalmoplegia of left eye    Improving  Received 5.5g solumedrol last admission  May be 2/2 MS (suspected, but not confirmed)        Depressive disorder, not elsewhere classified    Continue antidepressant, Zoloft 100 mg PO          * Encephalopathy    Patient with waxing and  waning of mental status throughout the day. MRI findings concerning for ischemic event vs active demyelination. There is ADC correlation of DWI abnormality. MRI showing R frontal and right temporal lobes as well as the left cerebellar hemisphere and left brainstem. Multiple vascular territories. Will certainly need to rule out toxic metabolic etiologies. Cultures thus far have been negative. Will need to repeat urine studies. Vascular Neurology is following. Please consider GIOVANNI as cardio-embolic phenomenon can present with ischemic events in multiple vascular territories. Will also obtain addition labs for auto-immune causes of clinical presentation.     I have ordered anti-Phospholipid ab/cardiolipin ab  Please consider GIOVANNI  Repeat U/A and uCx  Consider pan scan to rule out infection/abscess     F/u lyme titer (neg), WNV (neg), SSA (neg) , SSB (neg), dsDNA, vasculitis labs, ESR 15, CRP 5.37, HIV neg, REYMUNDO, as well as an autoimmune encephalopathy panel    F/u 24h EEG to assess for simple partial seizures / underlying etiology of waxing and waning MS            VTE Risk Mitigation         Ordered     Place sequential compression device  Until discontinued      05/24/17 0303     Medium Risk of VTE  Once      05/24/17 0303          Val Hudson MD  Neurology  [facility]    I have personally taken the history and examined the patient and agree with the resident's note as stated above.    Aldo Mercedes MD, JONATAN, FAAN  Department of Neurology   [facility] [place], [place]

## 2017-05-27 NOTE — PLAN OF CARE
Problem: SLP Goal  Goal: SLP Goal  Swallow eval completed. Pt safe for dental soft diet and thin liquids when pt fully awake/alert. See note for details.     JULIO Pena, CCC-SLP  5/27/2017

## 2017-05-27 NOTE — PROGRESS NOTES
Progress Note  Hospital Medicine    Patient Name: Bessy Nunez  YOB: 1960     Admit Date: 5/23/2017                     LOS: 3    SUBJECTIVE:     Reason for Admission:  Encephalopathy  See H&P for detailed presenting history and ROS.      Interval history:  This morning pt was found awake, alert and answering to questions appropriately. However, wife reported pt was still not himself, and that she thought his mental status might be worsening. Pt deteriorated during the day. EEG negative for seizures per Neurology. He was given lorazepam 2mg and keppra 1g earlier in the day.  A rapid response was called for worsening encephalopathy.   ABG 7.40/38/70/24/94% on room air.  Lactate 1.21.  Pt was protecting his airway without signs of distress. MRI head STAT showed rt temporal and frontal stroke, however, study limited by movement. Stroke consulted.        OBJECTIVE:     Vital Signs Range (Last 24H):  Temp:  [97.8 °F (36.6 °C)-98.4 °F (36.9 °C)]   Pulse:  [65-92]   Resp:  [18-19]   BP: ()/(55-62)   SpO2:  [96 %-99 %] Body mass index is 36.41 kg/m².  Wt Readings from Last 1 Encounters:   05/26/17 0400 118.4 kg (261 lb 0.4 oz)   05/25/17 0400 118 kg (260 lb 2.3 oz)   05/24/17 0725 118.5 kg (261 lb 3.9 oz)       I & O (Last 24H):  Intake/Output Summary (Last 24 hours) at 05/26/17 1940  Last data filed at 05/26/17 0900   Gross per 24 hour   Intake              340 ml   Output              200 ml   Net              140 ml       Physical Exam:  General: well developed, well nourished, no distress  HENT: Head:normocephalic, atraumatic. Ears:not examined. Nose: no discharge. Throat: lips, mucosa, and tongue normal; teeth and gums normal and no throat erythema.  Eyes: conjunctivae/corneas clear. PERRL.   Neck: supple, symmetrical, trachea midline, no JVD  Lungs:  clear to auscultation bilaterally and normal respiratory effort  Cardiovascular: Heart: regular rate and rhythm, S1, S2 normal, no murmur, click,  rub or gallop. Chest Wall: no tenderness. Extremities: no cyanosis or edema, or clubbing. Pulses: 2+ and symmetric.  Abdomen/Rectal: Abdomen: soft, non-tender non-distented; bowel sounds normal; no masses,  no organomegaly. Rectal: not examined  Psych/Behavioral:  This morning pt had flat affect, but was oriented x4 and was following commands. upon later evaluation pt was extremely lethargic following only some commands.    Diagnostic Results:  Lab Results   Component Value Date    WBC 14.22 (H) 05/26/2017    HGB 7.3 (L) 05/26/2017    HCT 20.9 (L) 05/26/2017    MCV 85 05/26/2017     05/26/2017       Recent Labs  Lab 05/26/17  1502   GLU 95      K 4.0      CO2 25   BUN 23*   CREATININE 0.9   CALCIUM 8.4*   MG 1.6     Lab Results   Component Value Date    INR 1.0 05/23/2017    INR 1.0 05/13/2017    INR 1.0 05/29/2015     Lab Results   Component Value Date    HGBA1C 11.4 (H) 05/13/2017     Recent Labs      05/24/17   1329  05/24/17   1645  05/24/17   2107  05/25/17   0838  05/25/17   1237  05/25/17   1646  05/25/17   2141  05/26/17   0220   POCTGLUCOSE  392*  281*  321*  196*  301*  182*  270*  274*       ASSESSMENT/PLAN:     Active Hospital Problems    Diagnosis  POA    *Encephalopathy [G93.40]  Yes    Leukocytosis [D72.829]  Yes    Diplopia [H53.2]  Yes    Multiple sclerosis [G35]  Yes    Fall [W19.XXXA]  Unknown    Syncope [R55]  Yes    Type 2 diabetes mellitus with diabetic polyneuropathy, with long-term current use of insulin [E11.42, Z79.4]  Not Applicable    Ataxia [R27.0]  Yes    Demyelinating changes in brain [G37.9]  Yes     By mri.  Several active lesions, many old.  5/13: MRA brain/neck, MRI brain w/wo contrast show no vascular occlusion, multiple foci of hyperintensity in deep cerebral periventricular white matter in pattern of demyelinating process.   5/17: MRI spine performed, no spinal cord lesions.      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]  Yes     5/13: JOSE  brain/neck, MRI brain w/wo contrast show no vascular occlusion, multiple foci of hyperintensity in deep cerebral periventricular white matter in pattern of demyelinating process.  5/17: MRI spine performed, no spinal cord lesions.      NAFLD (nonalcoholic fatty liver disease) [K76.0]  Yes     Chronic    Type 2 diabetes mellitus with hyperglycemia, with long-term current use of insulin [E11.65, Z79.4]  Not Applicable    Obesity [E66.9]  Yes     Chronic    Sleep apnea [G47.30]  Yes     Chronic    Depressive disorder, not elsewhere classified [F32.9]  Yes     Chronic    Essential hypertension [I10]  Yes     Chronic    Mixed hyperlipidemia [E78.2]  Yes     Chronic      Resolved Hospital Problems    Diagnosis Date Resolved POA   No resolved problems to display.       *Syncope  Encephalopathy   - Most likely 2/2 dehydration/ hypovolemia in setting of hyperglycemia (especially on steroids). Other ddx includes weakness/miscoordination 2/2 newly diagnosed demyelinating disease, seizure (no history); stroke is less likely given negative head CT, cardiac arrhythmia less likely given normal EKG.   - Received 1L IVF at OSH. Will continue IVF, NS 150ml/hr x10hr.   - Fall precautions, seizure precautions.   - PT/OT given weakness.   - worsening encephalopathy today: MRI findings are most compatible with acute infarction involving the right frontal and right temporal lobes as well as the left cerebellar hemisphere and left brainstem.  No evidence of intracranial hemorrhage.   - stroke service consulted ; appreciate recs:  Acute stroke vs demyelinating lesion, however, will proceed to treat as acute stroke.  Repeat MRI brain in 2-3 days  Repeat LP  Repeat MRI spine to look for spinal lesions  Continue aspirin 81 and atorvastatin 40  SBP <220  Sq heparin for DVT prophylaxis  PT/OT and speech to evaluate and treat  Neuro checks q2-4h  - Appreciate neurology recs: 24hr EEG, labs for encephalopathy workup.   - EEG negative for  "seizures, per neuro. Pt loaded with keppra and 2mg IV ativan today in setting of worsened encephalopathy.  - Defer further steroid use to Neurology.   - worsening leukocytosis could also indicate underlying process contributing to encephalopathy. F/up cultures and start empiric abx for UTI/G coverage.      Type 2 diabetes mellitus with diabetic polyneuropathy, with long-term current use of insulin   - Hyperglycemic in setting of uncontrolled insulin dependent DM and steroid use.   - DM diet.   - Home regimen includes: tresiba 42U qhs, victoza 1.8mg once daily, metformin.   - Last hospitalization was on Detemir 32U qhs, and Aspart 22U TIDWM.   - Started on Detemir 33U, Aspart 11U TIDWM, and LDSSI.   - Increased detemir 40U QHS, and aspart to 18U TID for more adequate glucose control   - Dietary consult for polyphagia in setting of uncontrolled DM     Diplopia   - Originally presenting symptom of demyelinating disease.   - Resolved.  - Diplopia following syncope may have been "unmasking" as opposed to acute neurologic problem.      Leukocytosis   - most likely 2/2 steroids, especially given no s/sx of infection.   - worsening today in setting of worsening encephalopathy  - will pan-culture and start empiric abx     Essential hypertension   - Cont home: ASA, Atenolol, Diltiazem, Valsartan.  - at goal, continue to monitor      Mixed hyperlipidemia   - Cont home Atorvastatin      Depressive disorder, not elsewhere classified   - cont home zoloft     PT/OT: recommend outpatient PT/OT  Diet: NPO - risk of aspiration  DVT PPx: hold chem dvt ppx for LP  Code Status: Full Code     Dispo:  Pending neuro workup     Case discussed with Dr. Hendricks. Attestion to follow.  Joesfina Urena PGY-1    "

## 2017-05-27 NOTE — PROGRESS NOTES
Patient has not urinated since I/O cath done earlier today to obtain urine specimen. Patient attempted to void in urinal but was unable to do so. Bladder scan done - 184cc urine noted in bladder. Standing order noted to perform I/O cath for urine >300cc on bladder scan. IVF initiated per order. Will encourage patient to try to void later. Urinal at bedside. Patient's wife and son also at bedside.

## 2017-05-27 NOTE — SUBJECTIVE & OBJECTIVE
Neurologic Chief Complaint: abnormal MRI    Subjective:     Interval History: Patient is seen for follow-up neurological assessment and treatment recommendations: No issue overnight still with waxing and waning symptoms    HPI, Past Medical, Family, and Social History remains the same as documented in the initial encounter.     Review of Systems   Constitutional: Negative for chills and fever.   Respiratory: Negative for cough and shortness of breath.    Neurological: Positive for speech difficulty.        Inattention, delayed response   Psychiatric/Behavioral:        Flat affect     Scheduled Meds:   aspirin  81 mg Oral Daily    atorvastatin  40 mg Oral Daily    cefTRIAXone (ROCEPHIN) IVPB  1 g Intravenous Q24H    diltiaZEM  240 mg Oral Daily    insulin aspart  18 Units Subcutaneous TIDWM    insulin detemir  40 Units Subcutaneous QHS    levetiracetam IVPB  1,000 mg Intravenous Q12H    sodium chloride 0.9%  1,000 mL Intravenous Once    vitamin D  5,000 Units Oral Daily     Continuous Infusions:   sodium chloride 0.9%       PRN Meds:dextrose 50%, dextrose 50%, glucagon (human recombinant), glucose, glucose, insulin aspart, omnipaque    Objective:     Vital Signs (Most Recent):  Temp: 98.2 °F (36.8 °C) (05/27/17 1400)  Pulse: 67 (05/27/17 1400)  Resp: 18 (05/27/17 1400)  BP: (!) 114/48 (p 1L bolus) (05/27/17 1400)  SpO2: 100 % (05/27/17 1400)  BP Location: Right arm    Vital Signs Range (Last 24H):  Temp:  [97.8 °F (36.6 °C)-98.5 °F (36.9 °C)]   Pulse:  [62-76]   Resp:  [15-20]   BP: ()/(46-65)   SpO2:  [96 %-100 %]   BP Location: Right arm    Physical Exam   Constitutional: He appears well-developed and well-nourished.   HENT:   Head: Normocephalic.   Eyes:   L JAYDEN   Neurological: He is alert.   Inattention, delayed responses, aphasia waxing and waning   Nursing note and vitals reviewed.      Neurological Exam:   LOC: alert and follows requests  Language: No aphasia  Speech: No  dysarthria  Orientation: Person, Place, Time  Motor*: Arm Left:  Normal (5/5), Leg Left:   Normal (5/5), Arm Right:   Normal (5/5), Leg Right:   Normal (5/5)  Tone: Arm-Left: normal; Leg-Left: normal; Arm-Right: normal; Leg-Right: normal    NIH Stroke Scale:    Level of Consciousness: 0 - alert  LOC Questions: 1 - answers one correctly  LOC Commands: 0 - performs both correctly  Best Gaze: 0 - normal  Visual: 1 - partial hemianopia  Facial Palsy: 0 - normal  Motor Left Arm: 0 - no drift  Motor Right Arm: 0 - no drift  Motor Left Le - no drift  Motor Right Le - no drift  Limb Ataxia: 0 - absent  Sensory: 0 - normal  Best Language: 0 - no aphasia  Dysarthria: 0 - normal articulation  Extinction and Inattention: 0 - no neglect  NIH Stroke Scale Total: 2      Laboratory:  CMP:   Recent Labs  Lab 17  0500   CALCIUM 7.8*   ALBUMIN 2.4*   PROT 4.9*      K 3.9   CO2 25      BUN 18   CREATININE 0.8   ALKPHOS 90   ALT 57*   AST 40   BILITOT 0.5     CBC:   Recent Labs  Lab 17  0500   WBC 12.36   RBC 2.46*   HGB 7.2*   HCT 21.2*      MCV 86   MCH 29.3   MCHC 34.0     Lipid Panel: No results for input(s): CHOL, LDLCALC, HDL, TRIG in the last 168 hours.  Coagulation:   Recent Labs  Lab 17  1057   INR 1.0   APTT <21.0     Platelet Aggregation Study: No results for input(s): PLTAGG, PLTAGINTERP, PLTAGREGLACO, ADPPLTAGGREG in the last 168 hours.  Hgb A1C: No results for input(s): HGBA1C in the last 168 hours.  TSH:   Recent Labs  Lab 17  0626   TSH 1.786       Diagnostic Results:  MRI Head. Date: 17  Please note evaluation is markedly limited by motion artifact, and the patient could not complete the exam.  However, the limited diffusion MRI findings are most compatible with acute infarction involving the right frontal and right temporal lobes as well as the left cerebellar hemisphere and left brainstem, as above.  No evidence of intracranial hemorrhage.     Echo. Date:  05/13/17  -EF 65%  -mild LAE

## 2017-05-27 NOTE — HOSPITAL COURSE
58 y/o male with possible MS treated with steroids on last admission. Patient has been having intermittent aphasia, inattention, and delayed response. Repeat MRI revealed new lesions with one lesion in R frontal lobe concerning for stroke but very degrade by motion. Patient with no real focal neuro deficits, moving all extremities, flat affect, follows commands but has inattention and delayed response. Symptoms are waxing and waning.     5/29: Repeated MRI is limited 2/2 motion artifact, revealing multiple scattered foci suggesting demyelination vs stroke. Given recent MRI there suspicion of cardio-embolic phenomenon. Plan for GIOVANNI after EGD given h/o melena and drop in H/H.        5/30: EGD done which revealed duodenal and gastric ulcer treated with cautery. Bedside Echo WNL. Bcx NGTD. Cardiology postponed GIOVANNI given multiple ulcers seen during EGD.  Awaiting spine MRI     6/2: Cardiology deferred GIOVANNI to be done in the out patient settings since endocarditis is low in DDX. Neurology are suspecting vasculitis, w/u is underway. Cerebral angio revealing multifocal stenosis mainly in the anterior circulation that is consisting with vasculitis or multifocal atherosclerosis. 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 Pt is off steroid       6/3: Patient was given a dose of cytoxan for CNS vasculitis. Will need a dose once per month for three months. MRA brain consistent with CNS vasculitis. Still has urinary and fecal incontinence- neuro has no plans for this and believes this is secondary to infarct.     6/4: Patient discharging home with home health. Will be started on maintenance prednisone 60 mg and will need follow up with Dr. Love in 2-3 weeks following discharge.

## 2017-05-27 NOTE — PROGRESS NOTES
"Called to bedside by RN due to worsening AMS.  RN stated pt responds to all questions with "[place]", including "who is your wife?"  Family at bedside very concerned about "steady decline" in mental status, states pt was much more alert and oriented this am.  Upon my evaluation, pt is verbal and appears restless.  I introduced myself as Dr. Jhaveri and asked his name and he responded with "Dr. Jhaveri".  He is oriented to year 2017, but not place, self, situation.  Is able to follow some simple commands.  No focal neurological deficits detected, though exam somewhat limited by mental status.  Vitals stable and satting well on room air, appears to be adequately protecting airway.  Family at beside denies alcohol or drug use that pt could be potentially withdrawing from.  Will obtain ammonia level and continue to monitor.  Spoke with charge nurse and placed patient in "critical care status" for closer nursing monitoring.  Will continue with Q2 neuro checks.        Leida Jhaveri MD PGY-1  "

## 2017-05-27 NOTE — PT/OT/SLP EVAL
"Speech Language Pathology  Evaluation    Bessy Nunez   MRN: [id number]   Admitting Diagnosis: Encephalopathy    Diet recommendations: Solid Diet Level: Dental Soft  Liquid Diet Level: Thin Feed only when awake/alert, HOB to 90 degrees, Small bites/sips and Meds crushed in puree Watch for pocketing. 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 : Yes  Keep patient NPO?: No   General Precautions: Standard, fall, seizure          Social Hx: Patient lives with wife    Prior diet: regular/thin    Subjective:  " [place]" when asked where he was  Patient goals: did not state    Pain/Comfort  Pain Rating 1: 0/10  Pain Rating Post-Intervention 1: 0/10    Objective:        Oral Musculature Evaluation  Oral Musculature: other (see comments) (limited OME due to decreased participation initially)  Dentition: present and adequate  Mucosal Quality: good  Oral Labial Strength and Mobility: functional retraction, functional pursing, functional seal, functional coordination  Lingual Strength and Mobility: functional protrusion, impaired right lateral movement, functional anterior elevation  Volitional Cough: adequate  Voice Prior to PO Intake: clear     Bedside Swallow Eval:  Consistencies " Assessed: Thin liquids cup via straw and cup, Puree 3 teaspoons and Solids cracker  Oral Phase: WFL; slightly increased time for mastication  Pharyngeal Phase: no overt clinical signs/symptoms of pharyngeal dysphagia    Additional Treatment:    Pt with varying mental status. Pt initially slow to respond but did appear to perk up during session. Pt is at risk for aspiration when mental status declines. Pt likely to start holding food/liquid at that time. If/when that happens,  Discontinue feeding until pt more awake/alert. Crush meds. Strict aspiration precautions. White board updated.  FIM:                                 Assessment:  Bessy Nunez is a 57 y.o. male with a medical diagnosis of Encephalopathy and presents with mild oral pharyngeal dysphagia.          Discharge recommendations: Discharge Facility/Level Of Care Needs:  (TBD)     Goals:    SLP Goals        Problem: SLP Goal    Goal Priority Disciplines Outcome   SLP Goal     SLP    Description:  Goals to be met 6/2  1.Pt will tolerate soft diet and thin liquids  2 pt will participate in speech lang eval if ordered by MD                     Plan:   Patient to be seen Therapy Frequency: 5 x/week   Plan of Care expires:    Plan of Care reviewed with: patient, spouse  SLP Follow-up?: Yes              JULIO Pena, CCC-SLP  05/27/2017

## 2017-05-27 NOTE — PROGRESS NOTES
Spoke with CT - orders noted to be changed from routine to ASAP for CT chest/abd/pelvis. CT to send for patient now - okay to scan patient even though he ate dinner.

## 2017-05-28 PROBLEM — D62 ACUTE BLOOD LOSS ANEMIA: Status: ACTIVE | Noted: 2017-05-28

## 2017-05-28 PROBLEM — K92.2 UPPER GI BLEED: Status: ACTIVE | Noted: 2017-05-28

## 2017-05-28 LAB
ABO + RH BLD: NORMAL
ALBUMIN SERPL BCP-MCNC: 2.3 G/DL
ALP SERPL-CCNC: 76 U/L
ALT SERPL W/O P-5'-P-CCNC: 47 U/L
ANION GAP SERPL CALC-SCNC: 8 MMOL/L
AST SERPL-CCNC: 38 U/L
BACTERIA BLD CULT: NORMAL
BASOPHILS # BLD AUTO: 0.01 K/UL
BASOPHILS NFR BLD: 0.1 %
BILIRUB SERPL-MCNC: 0.4 MG/DL
BLD GP AB SCN CELLS X3 SERPL QL: NORMAL
BLD PROD TYP BPU: NORMAL
BLOOD UNIT EXPIRATION DATE: NORMAL
BLOOD UNIT TYPE CODE: 5100
BLOOD UNIT TYPE: NORMAL
BUN SERPL-MCNC: 13 MG/DL
CALCIUM SERPL-MCNC: 7.9 MG/DL
CHLORIDE SERPL-SCNC: 110 MMOL/L
CO2 SERPL-SCNC: 23 MMOL/L
CODING SYSTEM: NORMAL
CREAT SERPL-MCNC: 0.8 MG/DL
DIFFERENTIAL METHOD: ABNORMAL
DISPENSE STATUS: NORMAL
EOSINOPHIL # BLD AUTO: 0.1 K/UL
EOSINOPHIL NFR BLD: 1.2 %
ERYTHROCYTE [DISTWIDTH] IN BLOOD BY AUTOMATED COUNT: 14.8 %
ERYTHROCYTE [DISTWIDTH] IN BLOOD BY AUTOMATED COUNT: 14.8 %
EST. GFR  (AFRICAN AMERICAN): >60 ML/MIN/1.73 M^2
EST. GFR  (NON AFRICAN AMERICAN): >60 ML/MIN/1.73 M^2
GLUCOSE SERPL-MCNC: 96 MG/DL
HCT VFR BLD AUTO: 19.6 %
HCT VFR BLD AUTO: 21.7 %
HGB BLD-MCNC: 6.7 G/DL
HGB BLD-MCNC: 7.4 G/DL
LYMPHOCYTES # BLD AUTO: 2.1 K/UL
LYMPHOCYTES NFR BLD: 23.8 %
MAGNESIUM SERPL-MCNC: 1.5 MG/DL
MCH RBC QN AUTO: 30.1 PG
MCH RBC QN AUTO: 30.2 PG
MCHC RBC AUTO-ENTMCNC: 34.1 %
MCHC RBC AUTO-ENTMCNC: 34.2 %
MCV RBC AUTO: 88 FL
MCV RBC AUTO: 88 FL
MONOCYTES # BLD AUTO: 0.6 K/UL
MONOCYTES NFR BLD: 6.5 %
NEUTROPHILS # BLD AUTO: 6.1 K/UL
NEUTROPHILS NFR BLD: 68.4 %
OB PNL STL: POSITIVE
PHOSPHATE SERPL-MCNC: 4.2 MG/DL
PLATELET # BLD AUTO: 133 K/UL
PLATELET # BLD AUTO: 143 K/UL
PMV BLD AUTO: 8.8 FL
PMV BLD AUTO: 9.2 FL
POCT GLUCOSE: 139 MG/DL (ref 70–110)
POCT GLUCOSE: 171 MG/DL (ref 70–110)
POCT GLUCOSE: 184 MG/DL (ref 70–110)
POCT GLUCOSE: 71 MG/DL (ref 70–110)
POCT GLUCOSE: 92 MG/DL (ref 70–110)
POTASSIUM SERPL-SCNC: 3.9 MMOL/L
PROT SERPL-MCNC: 4.7 G/DL
RBC # BLD AUTO: 2.22 M/UL
RBC # BLD AUTO: 2.46 M/UL
SODIUM SERPL-SCNC: 141 MMOL/L
TRANS ERYTHROCYTES VOL PATIENT: NORMAL ML
WBC # BLD AUTO: 8.7 K/UL
WBC # BLD AUTO: 8.96 K/UL

## 2017-05-28 PROCEDURE — 25000003 PHARM REV CODE 250: Performed by: STUDENT IN AN ORGANIZED HEALTH CARE EDUCATION/TRAINING PROGRAM

## 2017-05-28 PROCEDURE — 36415 COLL VENOUS BLD VENIPUNCTURE: CPT

## 2017-05-28 PROCEDURE — 86920 COMPATIBILITY TEST SPIN: CPT

## 2017-05-28 PROCEDURE — 99233 SBSQ HOSP IP/OBS HIGH 50: CPT | Mod: ,,, | Performed by: HOSPITALIST

## 2017-05-28 PROCEDURE — 84100 ASSAY OF PHOSPHORUS: CPT

## 2017-05-28 PROCEDURE — 25000003 PHARM REV CODE 250: Performed by: HOSPITALIST

## 2017-05-28 PROCEDURE — 36430 TRANSFUSION BLD/BLD COMPNT: CPT

## 2017-05-28 PROCEDURE — 82272 OCCULT BLD FECES 1-3 TESTS: CPT

## 2017-05-28 PROCEDURE — 99232 SBSQ HOSP IP/OBS MODERATE 35: CPT | Mod: ,,, | Performed by: INTERNAL MEDICINE

## 2017-05-28 PROCEDURE — 83735 ASSAY OF MAGNESIUM: CPT

## 2017-05-28 PROCEDURE — 86850 RBC ANTIBODY SCREEN: CPT

## 2017-05-28 PROCEDURE — 99223 1ST HOSP IP/OBS HIGH 75: CPT | Mod: ,,, | Performed by: PHYSICIAN ASSISTANT

## 2017-05-28 PROCEDURE — 99233 SBSQ HOSP IP/OBS HIGH 50: CPT | Mod: ,,, | Performed by: PSYCHIATRY & NEUROLOGY

## 2017-05-28 PROCEDURE — 85027 COMPLETE CBC AUTOMATED: CPT

## 2017-05-28 PROCEDURE — C9113 INJ PANTOPRAZOLE SODIUM, VIA: HCPCS | Performed by: STUDENT IN AN ORGANIZED HEALTH CARE EDUCATION/TRAINING PROGRAM

## 2017-05-28 PROCEDURE — P9021 RED BLOOD CELLS UNIT: HCPCS

## 2017-05-28 PROCEDURE — 63600175 PHARM REV CODE 636 W HCPCS: Performed by: STUDENT IN AN ORGANIZED HEALTH CARE EDUCATION/TRAINING PROGRAM

## 2017-05-28 PROCEDURE — 80053 COMPREHEN METABOLIC PANEL: CPT

## 2017-05-28 PROCEDURE — 63600175 PHARM REV CODE 636 W HCPCS: Performed by: INTERNAL MEDICINE

## 2017-05-28 PROCEDURE — 20000000 HC ICU ROOM

## 2017-05-28 PROCEDURE — 86900 BLOOD TYPING SEROLOGIC ABO: CPT

## 2017-05-28 PROCEDURE — 63600175 PHARM REV CODE 636 W HCPCS

## 2017-05-28 PROCEDURE — 87040 BLOOD CULTURE FOR BACTERIA: CPT | Mod: 59

## 2017-05-28 PROCEDURE — 85025 COMPLETE CBC W/AUTO DIFF WBC: CPT

## 2017-05-28 RX ORDER — HYDROCODONE BITARTRATE AND ACETAMINOPHEN 500; 5 MG/1; MG/1
TABLET ORAL
Status: DISCONTINUED | OUTPATIENT
Start: 2017-05-28 | End: 2017-05-30

## 2017-05-28 RX ORDER — PANTOPRAZOLE SODIUM 40 MG/10ML
40 INJECTION, POWDER, LYOPHILIZED, FOR SOLUTION INTRAVENOUS ONCE
Status: COMPLETED | OUTPATIENT
Start: 2017-05-28 | End: 2017-05-28

## 2017-05-28 RX ORDER — PANTOPRAZOLE SODIUM 40 MG/10ML
40 INJECTION, POWDER, LYOPHILIZED, FOR SOLUTION INTRAVENOUS 2 TIMES DAILY
Status: DISCONTINUED | OUTPATIENT
Start: 2017-05-28 | End: 2017-05-28

## 2017-05-28 RX ORDER — ACETAMINOPHEN 325 MG/1
650 TABLET ORAL EVERY 6 HOURS PRN
Status: DISCONTINUED | OUTPATIENT
Start: 2017-05-28 | End: 2017-06-04 | Stop reason: HOSPADM

## 2017-05-28 RX ORDER — DEXTROSE 50 % IN WATER (D50W) INTRAVENOUS SYRINGE
Status: DISPENSED
Start: 2017-05-28 | End: 2017-05-28

## 2017-05-28 RX ORDER — LEVETIRACETAM 5 MG/ML
500 INJECTION INTRAVASCULAR EVERY 12 HOURS
Status: DISCONTINUED | OUTPATIENT
Start: 2017-05-28 | End: 2017-05-31

## 2017-05-28 RX ORDER — SODIUM CHLORIDE 9 MG/ML
INJECTION, SOLUTION INTRAVENOUS CONTINUOUS
Status: DISCONTINUED | OUTPATIENT
Start: 2017-05-28 | End: 2017-05-30

## 2017-05-28 RX ORDER — MAGNESIUM SULFATE HEPTAHYDRATE 40 MG/ML
2 INJECTION, SOLUTION INTRAVENOUS ONCE
Status: COMPLETED | OUTPATIENT
Start: 2017-05-28 | End: 2017-05-28

## 2017-05-28 RX ORDER — DILTIAZEM HYDROCHLORIDE 60 MG/1
60 TABLET, FILM COATED ORAL EVERY 8 HOURS
Status: DISCONTINUED | OUTPATIENT
Start: 2017-05-28 | End: 2017-06-04 | Stop reason: HOSPADM

## 2017-05-28 RX ORDER — MIDAZOLAM HYDROCHLORIDE 1 MG/ML
2 INJECTION INTRAMUSCULAR; INTRAVENOUS ONCE AS NEEDED
Status: ACTIVE | OUTPATIENT
Start: 2017-05-28 | End: 2017-05-28

## 2017-05-28 RX ADMIN — DEXTROSE 8 MG/HR: 50 INJECTION, SOLUTION INTRAVENOUS at 07:05

## 2017-05-28 RX ADMIN — DEXTROSE 8 MG/HR: 50 INJECTION, SOLUTION INTRAVENOUS at 02:05

## 2017-05-28 RX ADMIN — DEXTROSE MONOHYDRATE 12.5 G: 25 INJECTION, SOLUTION INTRAVENOUS at 09:05

## 2017-05-28 RX ADMIN — LEVETIRACETAM 1000 MG: 10 INJECTION INTRAVENOUS at 08:05

## 2017-05-28 RX ADMIN — SODIUM CHLORIDE: 0.9 INJECTION, SOLUTION INTRAVENOUS at 12:05

## 2017-05-28 RX ADMIN — CEFTRIAXONE 1 G: 1 INJECTION, SOLUTION INTRAVENOUS at 07:05

## 2017-05-28 RX ADMIN — LEVETIRACETAM 500 MG: 5 INJECTION INTRAVENOUS at 08:05

## 2017-05-28 RX ADMIN — ACETAMINOPHEN 650 MG: 325 TABLET ORAL at 04:05

## 2017-05-28 RX ADMIN — SODIUM CHLORIDE: 0.9 INJECTION, SOLUTION INTRAVENOUS at 02:05

## 2017-05-28 RX ADMIN — DILTIAZEM HYDROCHLORIDE 60 MG: 60 TABLET, FILM COATED ORAL at 10:05

## 2017-05-28 RX ADMIN — ATORVASTATIN CALCIUM 40 MG: 20 TABLET, FILM COATED ORAL at 12:05

## 2017-05-28 RX ADMIN — PANTOPRAZOLE SODIUM 40 MG: 40 INJECTION, POWDER, FOR SOLUTION INTRAVENOUS at 12:05

## 2017-05-28 RX ADMIN — PANTOPRAZOLE SODIUM 40 MG: 40 INJECTION, POWDER, FOR SOLUTION INTRAVENOUS at 08:05

## 2017-05-28 RX ADMIN — MAGNESIUM SULFATE IN WATER 2 G: 40 INJECTION, SOLUTION INTRAVENOUS at 09:05

## 2017-05-28 RX ADMIN — ACETAMINOPHEN 650 MG: 325 TABLET ORAL at 12:05

## 2017-05-28 RX ADMIN — VITAMIN D, TAB 1000IU (100/BT) 5000 UNITS: 25 TAB at 12:05

## 2017-05-28 NOTE — PROGRESS NOTES
Lab called, stated pt has critical hematocrit of 19.6. This RN paged IM3 to report lab value, will wait for call back.

## 2017-05-28 NOTE — PHYSICIAN QUERY
PT Name: Bessy Nunez  MR #: [id number]     Physician Query Form - Diagnosis Clarification      CDS/: Debo Mondragon               Contact information: [email or web address]    This form is a permanent document in the medical record.     Query Date: May 28, 2017    By submitting this query, we are merely seeking further clarification of documentation.  Please utilize your independent clinical judgment when addressing the question(s) below.     The medical record contains the following:      Findings Supporting Clinical Information Location in Medical Record     Multiple sclerosis concerning for demyelinating/auto-immune/inflammtory central process with clinical response to pulse steroids    - MRI C/T/L WWO negative for additional active demyelinating lesions; possible prior lesions seen in jose/L cerebellum.       CSF results from 5/14/17 show zero oligoclonal bands, and normal IgG    Outpatient f/u in the MS clinic next week.  TONY virus pending.      Pt presented with double vision and found to have new MS lesion.  MS Panel did not demonstrate oligoclonal bands but elevated albumin.        5-18-17 progress note     Please clarify if the  mutiple sclerosis diagnosis has been:    [  ] Ruled In  [  ] Ruled In, Now Resolved  [  ] Resolved Prior to My Assessment  [  ] Ruled Out  [  ] Clinically insignificant  [x ] Clinically undetermined  [  ] Other/Clarification of findings (please specify)_______________________________    Please document in your progress notes daily for the duration of treatment, until resolved, and include in your discharge summary.

## 2017-05-28 NOTE — SIGNIFICANT EVENT
Hgb of 6.7 noted.  Went to bedside to perform bedside KAI.  Patient had large dark tarry bowel movement, applied to hemoccult test, POSITIVE for blood.  Protonix IV BID, type and screen, 1U PRBC, two large bore IVs ordered, consent for transfusion obtained from wife and son at bedside after discussion of benefits and risks.  Patient's wife showed verbal understanding and signed consent form.  Paged GI.    Patient afebrile, HR 90 (has been climbing from 62 in past 24 hours), RR 18, /71, hemodynamically stable.  Satting well.  Patient leaning on his R, complaining of L sided headache described as sharp earlier in the night, examined by night float physician, no focal neuro deficits.  This morning patient has weakness in R leg and when asked, patient states that he cannot move it.  He does not verbally reply to why.  Unable to discern if due to patient cooperation or organic neurological finding.  Attempted to call neurology spectra at [phone] due to complicated picture of demyelinating disease with possible stroke that is difficult to discern on CT.    Guanaco Story MD  PGY-1  Pager: [phone]

## 2017-05-28 NOTE — PROGRESS NOTES
VS remain stable. Patient less conversant following dinner - still able to recognize wife, son, and family but unable to answer orientation questions of where he is, year, or situation. Still with purposeful movement, good hand strength, dorsi/plantar flexion remains consistent. Pupils PERRLA. Family at bedside.

## 2017-05-28 NOTE — CONSULTS
[facility]  Gastroenterology  Consult Note    Patient Name: Bessy Nunez  MRN: [id number]  Admission Date: 5/23/2017  Hospital Length of Stay: 5 days  Code Status: Full Code   Attending Provider: Elvira Hendricks MD   Consulting Provider: Karma Pearson MD  Primary Care Physician: Edgar Nova MD  Principal Problem:Encephalopathy    Inpatient consult to Gastroenterology  Consult performed by: KARMA PEARSON  Consult ordered by: TIGRE MCKINNEY  Reason for consult: GIB        Subjective:     HPI:    Bessy Nunez is a 57 y.o. male with poorly controlled Type 2 diabetes on insulin, HTN, mixed HLP and recently diagnosed with multiple sclerosis admitted on 5/23/2017 with double vision, confusion and dizziness. Seen by neurology and vascular medicine and this was thought to be due to demyelinating disorder of unclear etiology, possible ischemic strokes. GI consulted for dark stools for last 2 days and anemia. His Hgb on admission was 11 slowly trended to 6.7 with no overt GIB. Remains HD stable. On aspirin only. PPIs started today. No prior EGD but colonoscopy 2014 with small polyp. CT abd/pelvis from yesterday unremarkable. He is on aspirin.     Review of Systems:  Constitutional: No fever, chills.   Eyes: +visual changes, + diplopia, no eye discharge  ENT: no sore throat or runny nose.  Respiratory: no cough or shortness of breath    Cardiovascular: no chest pain or palpitations    Gastrointestinal: as per HPI  Hematologic/Lymphatic: No petechia, no lymphadenopathy  Musculoskeletal: no arthralgias or myalgias    Neurological: no seizures, tremors   Behavioral/Psych: No suicidal ideation, no hallucination  Skin: No rash, no raynaud's    Past Medical History: has a past medical history of Essential hypertension; Internuclear ophthalmoplegia of left eye; Mixed hyperlipidemia; NAFLD (nonalcoholic fatty liver disease); CHASE (nonalcoholic steatohepatitis); Obesity; Stroke; and Type  "2 diabetes mellitus with diabetic polyneuropathy, with long-term current use of insulin.    Past Surgical History: has a past surgical history that includes Skin cancer excision.    Family History:family history includes Cancer in his father; Diabetes in his maternal aunt; Heart disease in his mother; Heart failure in his mother; Hypertension in his mother.    Allergies: Reviewed in EPIC.     Social History: reports that he has never smoked. He has never used smokeless tobacco. He reports that he does not drink alcohol or use drugs.    Home medications:   No current facility-administered medications on file prior to encounter.      Current Outpatient Prescriptions on File Prior to Encounter   Medication Sig Dispense Refill    aspirin (ECOTRIN) 81 MG EC tablet Take 81 mg by mouth once daily.      atenolol (TENORMIN) 50 MG tablet Take 1 tablet (50 mg total) by mouth once daily. 90 tablet 3    atorvastatin (LIPITOR) 40 MG tablet Take 1 tablet (40 mg total) by mouth once daily. 90 tablet 3    diltiaZEM (DILACOR XR) 240 MG CDCR Take 1 capsule (240 mg total) by mouth once daily. 90 capsule 3    insulin degludec (TRESIBA FLEXTOUCH U-200) 200 unit/mL (3 mL) InPn Inject 42 Units into the skin every evening. 3 Syringe 1    insulin needles, disposable, (BD ULTRA-FINE ANA PEN NEEDLES) 32 x 5/32 " Ndle Inject twice daily 100 each 3    liraglutide 0.6 mg/0.1 mL, 18 mg/3 mL, subq PNIJ (VICTOZA 3-TOMASZ) 0.6 mg/0.1 mL (18 mg/3 mL) PnIj Inject 1.8 mg into the skin once daily. 9 mL 11    metformin (GLUCOPHAGE-XR) 500 MG 24 hr tablet Take 2 tablets (1,000 mg total) by mouth 2 (two) times daily with meals. 360 tablet 0    methylPREDNISolone (MEDROL DOSEPACK) 4 mg tablet Use as directed on dose-pack -Pharmacy please specify directions for patient 1 Package 0    omega-3 fatty acids-vitamin E (FISH OIL) 1,000 mg Cap Take 1 capsule by mouth 2 (two) times daily.  0    sertraline (ZOLOFT) 100 MG tablet 1 and half tablet daily " (Patient taking differently: Take 150 mg by mouth once daily. ) 135 tablet 3    valsartan (DIOVAN) 320 MG tablet Take 1 tablet (320 mg total) by mouth once daily. 90 tablet 3    vitamin D 1000 units Tab Take 5 tablets (5,000 Units total) by mouth once daily.         Scheduled Meds:    aspirin  81 mg Oral Daily    atorvastatin  40 mg Oral Daily    cefTRIAXone (ROCEPHIN) IVPB  1 g Intravenous Q24H    diltiaZEM  240 mg Oral Daily    insulin aspart  18 Units Subcutaneous TIDWM    insulin detemir  40 Units Subcutaneous QHS    levetiracetam IVPB  1,000 mg Intravenous Q12H    magnesium sulfate IVPB  2 g Intravenous Once    pantoprazole  40 mg Intravenous BID    sodium chloride 0.9%  1,000 mL Intravenous Once    vitamin D  5,000 Units Oral Daily       Continuous Infusions:      PRN Meds: sodium chloride, acetaminophen, dextrose 50%, dextrose 50%, glucagon (human recombinant), glucose, glucose, insulin aspart, omnipaque    Vital signs:  Temp:  [97.6 °F (36.4 °C)-98.5 °F (36.9 °C)]   Pulse:  [67-97]   Resp:  [17-20]   BP: ()/(48-72)   SpO2:  [95 %-100 %]     Physical exam:  General: No acute distress, obese  HEENT: Head: Normocephalic, atraumatic.   Eyes: Sclera non-icteric. EOMI.   Neck: Supple  Heart: Regular rate and rhythm, no gallops  Lungs: Non labored breathing, no wheezing  Abdomen: Bowel sounds normal, no organomegaly, masses, or hernia. No tenderness, no rebound or guarding. No distention.   Rectal: Deferred  Extremities: No deformities, no C/C/E  Neurologic: Able to follow commands and move 4 extremities. AOX1  Skin: No rash    Routine labs:    Recent Labs  Lab 05/28/17  0334   WBC 8.96   HGB 6.7*   HCT 19.6*   *   MCV 88       Recent Labs  Lab 05/28/17  0334      K 3.9   CO2 23   BUN 13   CREATININE 0.8       Recent Labs  Lab 05/28/17  0334   ALBUMIN 2.3*   ALKPHOS 76   ALT 47*   AST 38   BILITOT 0.4     Imaging and prior endoscopies  As above    Assessment/Plan:     Bessy Nunez  is a 57 y.o. male with dark stools. DDx PUD, gastritis, esophagitis.     Acute blood loss anemia    .          Plan:  Protonix 80mg IV bolus x 1 then gtt at 8mg/hr  Intravascular resuscitation/support with IVFs   Serial H/H's and pRBCs transfusion as indicated  Discontinue all NSAIDs and Heparin products  Please correct any coagulopathy with platelets and FFP to a goal of platelets >50K and INR <2.0  Maintain IV access with 2 large bore IVs  NPO  Plan for EGD tomorrow or emergently if there is hemodynamic compromise in the setting of overt GI bleeding  Please notify GI team if there is significant change in patient's clinical status      Adarsh Welsh MD  Gastroenterology  [facility]

## 2017-05-28 NOTE — PROGRESS NOTES
Patient again complaining of left sided headache, rated as a 2 out of 10. Also complaining of double vision. Pupils equal and reactive. Tylenol given per prn order - crushed and given in pudding. Dr. Story notified of patient's complaints. MD to bedside to assess.

## 2017-05-28 NOTE — ASSESSMENT & PLAN NOTE
-large melanotic stool prior to transfer to [facility]  -H 7.4, transfuse 2 U PRBC  -holding antiplatelets and DVT proph secondary to bleed  -possibly 2/2 recent steroids, protonix ggt  -EGD by GI tomorrow  -NPO  -continue to monitor

## 2017-05-28 NOTE — PROGRESS NOTES
BG 71. Patient appears more tired this morning, still arousable to gentle touch but falls back asleep - not alert/awake enough for PO intake at this time. 1/2 amp D50W given.     BG rechecked at 0955 - up to 171. Patient more awake and alert. States he no longer has headache.

## 2017-05-28 NOTE — PROGRESS NOTES
"Pt's son notified this nurse that the pt is c/o L-sided HA. This RN asked the pt if his head was hurting and the pt replied "yes". The pt was able to point to where his head was hurting. Notified IM3 resident and MD Arjun came up to the BS to exam the pt. No acute findings on neuro exam, MD Arjun ordered tylenol 650mg PO q6 prn for HA. Will continue to monitor.  "

## 2017-05-28 NOTE — PLAN OF CARE
Problem: Patient Care Overview  Goal: Plan of Care Review  Outcome: Ongoing (interventions implemented as appropriate)  VS stable, pt making progress toward goals. CT scan performed. Neuro status unchanged, disoriented, moves all extremities, pupils reactive. Meds given per MD orders. Condom cath placed. Blood cultures obtained. LP, MRI, EGD, and GIOVANNI pending for 05/29. 1 unit of RBC infusing, 1 unit pending. Protonix and NS gtt continued. Plan of care reviewed with Bessy Nunez, spouse and, son. Emotional support provided. Will continue to monitor and notify if needed.

## 2017-05-28 NOTE — PLAN OF CARE
Problem: Patient Care Overview  Goal: Plan of Care Review  Outcome: Ongoing (interventions implemented as appropriate)  Pt mental status has been waxing/waning this shift, q2 neuro checks this shift, pt disoriented to time/place/situation but oriented to self. Pt is in bed wearing non-skid footwear, bed in low/locked position, call bell next to pt, bed alarm set, wife and son at bedisde. Pt able to reposition self in bed, incontinence care provided for pt throughout shift. Pt c/o HA, tylenol 650mg PO given.Pt VSS, on tele SR in the 80's. Pt is afebrile at this time. Proper hand hygiene performed before and after pt care activities. BG monitored as ordered, no SSI needed this shift. Pt went down for CT of chest/abdomen/pelvis at start of shift, also had CXR as well, no acute findings on both. Stool sample sent off to check for blood in the stools 2/2 pt having dark tarry looking stools. Pt wife and son reminded to use call bell to call for assistance, verbalizes understanding. Will continue to monitor.

## 2017-05-28 NOTE — PROGRESS NOTES
Patient transferred to 7075 per bed. Chart and medications sent with patient. Protonix gtt and NS infusing. Wife and son updated on plan.

## 2017-05-28 NOTE — H&P
[facility]  Neurocritical Care  H&P    Admit Date: 5/23/2017  Service Date: 05/28/2017  Length of Stay: 5    Subjective:     Chief Complaint: Encephalopathy    History of Present Illness: Patient is a 58 y/o male with poorly controlled Type 2 diabetes on insulin, HTN, mixed HLP and recently diagnosed with multiple sclerosis here at [facility] 2 weeks ago and [place] [facility] [place] Medicine Team 3 after receiving 5 days of high dose IV Solumedrol. Patient was discharged on Medrol dospepak. Patient according to Dr. Cho states double vision was improving and getting better until this 5/24 when had a severe episode of double vision and dizziness and blacked out at home. Patient came around but was complaining of severe double vision and wife noted patient also confused after coming around so went to [facility]. Patient fell on the way to the bathroom. He does not remember falling, nor having any prodrome. Wife states she heard the thump and found him sitting on the floor, staring blankly into space, and confused.      At [place], patient noted to have WBC 19, but is on steroids. Blood culture sent and CXR done that was unremarkable. CT imaging of head unremarkable for any new findings. Patient noted to have  and BUN/Cr of 40/1.1 so Dr. Cho felt patient may just be dehydrated so patient given 1 liter of NS in ER. Patient feeling better. He spoke with Neurology here at [facility] who recommended admit for evaluation due to worsening double vision but Dr. Cho thinks likely just related to dehydration as patient has been having high blood sugars at home and during recent hospitalization.      Pt admitted to hospital medicine 5/24.     On 5/28, patient demonstrates waxing and waning of mental status throughout the day. MRI findings concerning for ischemic event vs active demyelination, with ADC correlation of DWI abnormality. MRI shows lesions  suggestive of infarct in the R frontal and temporal lobes as well as the left cerebellar hemisphere and left brainstem, correlating with multiple vascular territories. Will need to rule out toxic metabolic etiologies. Cultures thus far have been negative. Repeat UA negative.  Vascular Neurology is following. Hospital medicine recommending GIOVANNI as cardio-embolic phenomenon can present with ischemic events in multiple vascular territories. Additional labs for auto-immune causes of clinical presentation ordered.     Blood cx x 2 and echo ordered. Pt admitted to [facility] for higher level of care.     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    Mixed hyperlipidemia 11/9/2012    NAFLD (nonalcoholic fatty liver disease) 10/11/2013    CHASE (nonalcoholic steatohepatitis)     Obesity     Stroke     Type 2 diabetes mellitus with diabetic polyneuropathy, with long-term current use of insulin 5/14/2017     Past Surgical History:   Procedure Laterality Date    SKIN CANCER EXCISION       Family History   Problem Relation Age of Onset    Heart disease Mother     Hypertension Mother     Heart failure Mother     Cancer Father      lung    Diabetes Maternal Aunt      Social History     Social History    Marital status:      Spouse name: N/A    Number of children: N/A    Years of education: N/A     Occupational History    unemployed      Social History Main Topics    Smoking status: Never Smoker    Smokeless tobacco: Never Used    Alcohol use No    Drug use: No    Sexual activity: Not on file     Other Topics Concern    Not on file     Social History Narrative    No  narrative on file     Review of patient's allergies indicates:  No Known Allergies      Review of Systems:  Constitutional: Denies fevers or chills.  Pulmonary: Denies shortness of breath or cough.  Cardiology: Denies chest pain or palpitations.  GI: Denies abdominal pain or constipation.  Neurologic: Denies new weakness,  headache, or paresthesias. Denies double vision.     Vitals:   Temp: 98.7 °F (37.1 °C) (05/28/17 1618)  Pulse: 74 (05/28/17 1700)  Resp: 18 (05/28/17 1700)  BP: (!) 144/75 (05/28/17 1700)  SpO2: 98 % (05/28/17 1700)    Temp:  [97.6 °F (36.4 °C)-98.7 °F (37.1 °C)] 98.7 °F (37.1 °C)  Pulse:  [69-97] 74  Resp:  [17-20] 18  SpO2:  [95 %-100 %] 98 %  BP: (103-154)/(49-77) 144/75        05/27 0701 - 05/28 0700  In: 2811.7 [P.O.:120; I.V.:1441.7]  Out: 350 [Urine:350]     Examination:   Constitutional: Well-nourished and -developed. No apparent distress.   Eyes: Conjunctiva clear, anicteric. Lids no lesions.  Head/Ears/Nose/Mouth/Throat/Neck: Moist mucous membranes. External ears, nose atraumatic.   Cardiovascular: Regular rhythm. No murmurs. No leg edema.  Respiratory: Comfortable respirations. Clear to auscultation.  Gastrointestinal: No hernia. Soft, nondistended, nontender. + bowel sounds.    Neurologic:  -GCS [id number]  -Alert. Oriented to person, place, and time. Speech fluent. Follows commands.  -Cranial nerves II-XII intact. PERRLA  -Motor 5/5 strength BUE BLE. RUE pronator drift. R facial weakness.   -Sensation to soft touch intact throughout    Medications:   Continuous    sodium chloride 0.9% Last Rate: 75 mL/hr at 05/28/17 1618   pantoprazole 40 mg in dextrose 5 % 100 mL infusion (ready to mix system) Last Rate: 8 mg/hr (05/28/17 1618)   Scheduled    atorvastatin 40 mg Daily   cefTRIAXone (ROCEPHIN) IVPB 1 g Q24H   dextrose 50 % in water (D50W)     diltiaZEM 60 mg Q8H   insulin aspart 18 Units TIDWM   insulin detemir 35 Units QHS   levetiracetam IVPB 500 mg Q12H   sodium chloride 0.9% 1,000 mL  Once   vitamin D 5,000 Units Daily   PRN    sodium chloride  Q24H PRN   sodium chloride  Q24H PRN   acetaminophen 650 mg Q6H PRN   dextrose 50% 12.5 g PRN   dextrose 50% 25 g PRN   glucagon (human recombinant) 1 mg PRN   glucose 16 g PRN   glucose 24 g PRN   insulin aspart 0-5 Units QID (AC + HS) PRN   midazolam (PF) 2 mg Once PRN   omnipaque 15 mL PRN      Today I independently reviewed pertinent medications, lines/drains/airways, imaging, cardiology, lab results, notably: no leukocytosis, no need for intubation at this time, H 7.4, transfuse 2 U PRBC     Assessment/Plan:     Neuro   Acute encephalopathy    -24 hr EEG completed- non-focal slowing- indicative of encephalopathy   -keppra 500 BID  -MRI findings concerning for ischemic event vs active demyelination  -MRI shows lesions suggestive of infarct in the R frontal and temporal lobes as well as the left cerebellar hemisphere and left brainstem, correlating with multiple vascular territories.   -rule out toxic metabolic etiologies  -Cultures thus far have been negative. Repeat blood cultures x 2   -rocephin 1g qd  -Repeat UA negative.    -Vascular Neurology is following.  -echo pending as cardio-embolic phenomenon can present with ischemic events in multiple vascular territories  -GI to perform EGD tomorrow 2/2 UGIbleed/large melanotic stool, on protonix ggt, after EGD will schedule GIOVANNI if appropriate  -work up for auto-immune causes of clinical presentation.   -repeat MRI brain and spine scheduled for 5/29  -stat CT for neuro change   -PT OT SLP        Multiple sclerosis    -hx of  -recent admission for treatment with steroids         Demyelinating changes in brain    -As seen on MRIs from 5/13, no spinal cord lesions  -S/p 5.5g solumedrol last admission  -Discussed with MS team - will need outpatient f/u appointment        Internuclear ophthalmoplegia of left eye    -Improving  -Received 5.5g solumedrol last admission  -May be 2/2 MS (suspected, but not  confirmed)        Cardiac   Essential hypertension    -diltiazem 60 mg q8h  -SBP<180        Hem/Onc   Leukocytosis    -resolved  -likely 2/2 steroids        Endocrine   Type 2 diabetes mellitus with hyperglycemia, with long-term current use of insulin    -SSI  -A1C 11.4        Fluids/Electrolytes/Nutrition/GI   Upper GI bleed    -large melanotic stool prior to transfer to [facility]  -H 7.4, transfuse 2 U PRBC  -holding antiplatelets and DVT proph secondary to bleed  -possibly 2/2 recent steroids, protonix ggt  -EGD by GI tomorrow  -NPO  -continue to monitor        Obesity    -BMI 36.4        Mixed hyperlipidemia    -continue daily statin         Other   Acute blood loss anemia    -2/2 UGI bleed  -transfuse 2 U PRBC  -H/H 7.4/21.4            Prophylaxis:  Venous Thromboembolism: mechanical  Stress Ulcer: PPI  Ventilator Pneumonia: not applicable     Activity Orders          Up with assistance starting at 05/28 1711    Up with assistance starting at 05/28 1635    Straight Cath starting at 05/27 0813        Full Code    Sandra Reyes PA-C  Neurocritical Care  [facility]

## 2017-05-28 NOTE — PROGRESS NOTES
Hospital Medicine  Progress Note      SUBJECTIVE:     Chief Complaint / Reason for Admission: Encephalopathy    LOS: 5 days  Admit Date: 5/23/2017    Interval history  No acute events overnight. VSS, afebrile. Continues to be encephalopathic, leaning on right, not moving R leg, discussed with vascular neurology.  Reasonable to get CT scan but would not , not a tPA candidate given positive FOBT.  GI consulted, likely EGD tomorrow.  PRBC 1U ordered.    Scheduled Medications   atorvastatin  40 mg Oral Daily    cefTRIAXone (ROCEPHIN) IVPB  1 g Intravenous Q24H    diltiaZEM  240 mg Oral Daily    insulin aspart  18 Units Subcutaneous TIDWM    insulin detemir  40 Units Subcutaneous QHS    levetiracetam IVPB  1,000 mg Intravenous Q12H    magnesium sulfate IVPB  2 g Intravenous Once    pantoprazole  40 mg Intravenous BID    sodium chloride 0.9%  1,000 mL Intravenous Once    vitamin D  5,000 Units Oral Daily       Continous Medications       Medications PRN  sodium chloride, acetaminophen, dextrose 50%, dextrose 50%, glucagon (human recombinant), glucose, glucose, insulin aspart, omnipaque    OBJECTIVE:     Temp:  [97.6 °F (36.4 °C)-98.5 °F (36.9 °C)]   Pulse:  [67-97]   Resp:  [17-20]   BP: ()/(48-72)   SpO2:  [95 %-100 %]     Vitals:    05/28/17 0916   BP: (!) 119/57   Pulse: 77   Resp: 18   Temp: 98.2 °F (36.8 °C)       I & O (Last 24H):  I/O last 3 completed shifts:  In: 2961.7 [P.O.:120; I.V.:1441.7; IV Piggyback:1400]  Out: 350 [Urine:350]    Physical Exam:  General: well developed, well nourished, no distress  HENT: Head:normocephalic, atraumatic. Ears:not examined. Nose: no discharge. Throat: lips, mucosa, and tongue normal; teeth and gums normal and no throat erythema.  Eyes: conjunctivae/corneas clear. PERRL.   Neck: supple, symmetrical, trachea midline, no JVD  Lungs:  clear to auscultation bilaterally and normal respiratory effort  Cardiovascular: Heart: regular rate and rhythm,  S1, S2 normal, no murmur, click, rub or gallop. Chest Wall: no tenderness. Extremities: no cyanosis or edema, or clubbing. Pulses: 2+ and symmetric.  Abdomen/Rectal: Abdomen: soft, non-tender non-distented; bowel sounds normal; no masses,  no organomegaly. Rectal: not examined  Neuro: R leg weak, unable to determine if due to cooperation and mental status, awake, alert, waxing/waning of orientation.  Psych/Behavioral: Persistent flat affect but can smile to jokes, following only some commands and answering some questions.     Laboratory:  CBC BMP     Recent Labs  Lab 05/27/17  0500 05/28/17  0334   WBC 12.36 8.96   HGB 7.2* 6.7*   HCT 21.2* 19.6*   MCV 86 88    143*   RDW 14.1 14.8*      Recent Labs  Lab 05/27/17  0500 05/28/17  0334    141   K 3.9 3.9    110   CO2 25 23   BUN 18 13   CREATININE 0.8 0.8   GLU 88 96   CALCIUM 7.8* 7.9*          LFTs    Recent Labs  Lab 05/27/17  0500 05/28/17  0334   PROT 4.9* 4.7*   BILITOT 0.5 0.4   ALKPHOS 90 76   AST 40 38   ALT 57* 47*      Urine    Recent Labs  Lab 05/27/17  0953   COLORU Yellow   SPECGRAV 1.015   PHUR 5.0   PROTEINUA Negative   NITRITE Negative   LEUKOCYTESUR Negative   UROBILINOGEN 2.0        Coag Labs    Recent Labs  Lab 05/23/17  1057   INR 1.0    Mag & Phos    Recent Labs  Lab 05/26/17  1502 05/28/17  0334   MG 1.6 1.5*   PHOS 3.9 4.2        Lactic Acid    Recent Labs  Lab 05/23/17  1058   LACTATE 3.3*       Cardiac Enzyme    Recent Labs  Lab 05/23/17  1057 05/23/17  1058 05/26/17  1138   CPK 20  20  --  18*   TROPONINI  --  0.024  --    CPKMB 0.5  --   --    MB 2.5  --   --        BNP    Recent Labs  Lab 05/23/17  1057   BNP 13       ABG    Recent Labs  Lab 05/26/17  1449   PH 7.406   PO2 70*   PCO2 38.3   HCO3 24.1   BE -1       Thyroid    Recent Labs  Lab 05/24/17  0626   TSH 1.786       Lipase  No results for input(s): LIPASE in the last 168 hours.    Hemoglobin A1C  No results for input(s): HGBA1C in the last 168 hours.    POCT  Glucose  Recent Labs      05/26/17   0758  05/26/17   1214  05/26/17   1427  05/26/17   1810  05/26/17   2214  05/27/17   0751  05/27/17   1304  05/27/17   1716   POCTGLUCOSE  206*  176*  103  126*  152*  110  153*  97         Radiology:  Imaging Results          X-Ray Chest 1 View (Final result)  Result time 05/27/17 21:10:05    Final result by Torsten Kolb MD (05/27/17 21:10:05)                 Impression:        No acute findings in the chest. No significant change from prior.  ______________________________________   ______________________________________     Electronically signed by resident: JENNIFER MAYS MD  Date:     05/27/17  Time:    20:49            As the supervising and teaching physician, I personally reviewed the images and resident's interpretation and I agree with the findings.          Electronically signed by: TORSTEN KOLB MD  Date:     05/27/17  Time:    21:10              Narrative:    Technique: One AP view of the chest.    Indication: Encephalopathy.    Comparison: CT 5/27/17, radiograph 5/23/17.    Findings:    Patient rotated to LEFT.    The trachea and mediastinum are midline.  The cardiac silhouette is prominent likely secondary to technique.  Cardiac monitoring leads overlie the thorax.  The lungs are symmetrically aerated and clear.  No significant pleural fluid.  No pneumothorax.  The visualized upper abdomen is unremarkable. Osseous structures appear unchanged.                             CT Abdomen Pelvis  Without Contrast (Final result)  Result time 05/27/17 22:29:58    Final result by Mikhail Hatch MD (05/27/17 22:29:58)                 Impression:        1. Mild cardiomegaly. Coronary artery disease.    2. Nonobstructing right renal stone.      Electronically signed by: MIKHAIL HATCH  Date:     05/27/17  Time:    22:29              Narrative:    Procedure comments: The patient was surveyed from the thoracic inlet through the pelvis without IV or oral contrast and data  was reconstructed for coronal, sagittal, and axial images.    Comparison: None.    Findings:    Examination of the vascular and soft tissue structures at the base of the neck is unremarkable.    The thoracic aorta maintains normal caliber, contour, and course without significant atherosclerotic calcification within its course.  The heart appears mildly enlarged. There are coronary artery calcifications. There is no significant pericardial effusion.    The esophagus maintains a normal course and caliber. There is no axillary, mediastinal, or hilar lymphadenopathy.      The trachea is midline, the proximal airways are patent and the lungs are symmetrically expanded.   Examination of the lung fields demonstrates no evidence of air space consolidation or pneumothorax. There is bilateral dependent atelectasis. There is no significant pleural effusion.      The noncontrast appearance of the hepatic parenchyma appears within normal limits.  The gallbladder shows no evidence of stones or pericholecystic fluid.  There is no intra-or extrahepatic biliary ductal dilatation.    The stomach, spleen, pancreas, and adrenal glands are unremarkable.    The kidneys are normal in size and location. There is no hydronephrosis.  There is a 4 mm nonobstructing right renal stone. The urinary bladder demonstrates no significant abnormality. The prostate appears within normal limits.    The abdominal aorta is normal in course and caliber with mild atherosclerotic change along its course.    The visualized loops of small and large bowel show no evidence of obstruction or inflammation. The appendix is unremarkable.  There is no ascites, free fluid, or intraperitoneal free air. No bulky lymphadenopathy is identified.    The osseous structures demonstrate mild degenerative changes of the spine. There is a sclerotic lesion within the L1 vertebral body previously characterized as an osseous hemangioma.  The extraperitoneal soft tissues are  unremarkable.                             CT Chest Without Contrast (Final result)  Result time 05/27/17 22:29:57    Final result by Mikhail Hatch MD (05/27/17 22:29:57)                 Impression:        1. Mild cardiomegaly. Coronary artery disease.    2. Nonobstructing right renal stone.      Electronically signed by: MIKHAIL HATCH  Date:     05/27/17  Time:    22:29              Narrative:    Procedure comments: The patient was surveyed from the thoracic inlet through the pelvis without IV or oral contrast and data was reconstructed for coronal, sagittal, and axial images.    Comparison: None.    Findings:    Examination of the vascular and soft tissue structures at the base of the neck is unremarkable.    The thoracic aorta maintains normal caliber, contour, and course without significant atherosclerotic calcification within its course.  The heart appears mildly enlarged. There are coronary artery calcifications. There is no significant pericardial effusion.    The esophagus maintains a normal course and caliber. There is no axillary, mediastinal, or hilar lymphadenopathy.      The trachea is midline, the proximal airways are patent and the lungs are symmetrically expanded.   Examination of the lung fields demonstrates no evidence of air space consolidation or pneumothorax. There is bilateral dependent atelectasis. There is no significant pleural effusion.      The noncontrast appearance of the hepatic parenchyma appears within normal limits.  The gallbladder shows no evidence of stones or pericholecystic fluid.  There is no intra-or extrahepatic biliary ductal dilatation.    The stomach, spleen, pancreas, and adrenal glands are unremarkable.    The kidneys are normal in size and location. There is no hydronephrosis.  There is a 4 mm nonobstructing right renal stone. The urinary bladder demonstrates no significant abnormality. The prostate appears within normal limits.    The abdominal aorta is normal in  course and caliber with mild atherosclerotic change along its course.    The visualized loops of small and large bowel show no evidence of obstruction or inflammation. The appendix is unremarkable.  There is no ascites, free fluid, or intraperitoneal free air. No bulky lymphadenopathy is identified.    The osseous structures demonstrate mild degenerative changes of the spine. There is a sclerotic lesion within the L1 vertebral body previously characterized as an osseous hemangioma.  The extraperitoneal soft tissues are unremarkable.                             US Abdomen Limited (Final result)  Result time 05/26/17 21:09:13    Final result by Seth Kolb MD (05/26/17 21:09:13)                 Impression:        Hepatosplenomegaly    Evidence of hepatic steatosis     ______________________________________     Electronically signed by resident: EVAN FLORES MD  Date:     05/26/17  Time:    20:50            As the supervising and teaching physician, I personally reviewed the images and resident's interpretation and I agree with the findings.          Electronically signed by: SETH KOLB MD  Date:     05/26/17  Time:    21:09              Narrative:    Time of Procedure: 05/26/17 19:54:00  Accession # [id number]    Reason for study: Transaminitis    Comparison: ultrasound abdomen 05/11/2015    Findings: The liver is enlarged measuring 16.7 cm.  Hepatic parenchyma is attenuating, with HRI of 1.77. There are no hepatic masses.  No intra- or extrahepatic biliary ductal dilatation. The common bile duct measures 0.5 cm.  The gallbladder appears normal. No evidence for cholelithiasis.  Sonographic Go's sign is negative. The visualized portions of the pancreas appear normal. The spleen is enlarged and measures 14.7 x 4.0 cm. No ascites.                             CT Head Without Contrast (Final result)  Result time 05/26/17 17:49:27    Final result by Evan Hagen MD (05/26/17 17:49:27)                  Impression:         Motion severely limits the examination.    No detrimental change in this patient with volume loss, and multiple areas of hyperattenuation consistent with a infarcts, and periventricular hypoattenuation likely related to the patient's demyelinating disease.  ______________________________________     Electronically signed by resident: EVELIN KAPADIA  Date:     05/26/17  Time:    17:39            As the supervising and teaching physician, I personally reviewed the images and resident's interpretation and I agree with the findings.          Electronically signed by: Dr. Evan Hagen MD  Date:     05/26/17  Time:    17:49              Narrative:    CT head without contrast    CLINICAL INDICATION: acute encephalopathy    TECHNIQUE: Axial CT images obtained throughout the region of the head without the use of intravenous contrast. Axial, sagittal and coronal reconstructions were performed.    COMPARISON: CT head without contrast 5/23/2017 MRI brain 5/13/2017    FINDINGS:  The exam is limited by patient motion artifact.    There is cerebral volume loss, with diffuse ventricular hypoattenuation related to the patient's demyelinating disease.    There is an old lacunar infarct about the left internal capsule. No parenchymal mass, hemorrhage, edema or major vascular distribution infarct.    No extra-axial blood or fluid collections.    There is a small left posterior scalp hematoma, stable from prior. The cranium appears intact. Mastoid air cells and paranasal sinuses are essentially clear.                             MRI Brain W WO Contrast (Final result)     Abnormal  Result time 05/26/17 17:11:27    Final result by Hai Dove MD (05/26/17 17:11:27)                 Impression:      Please note evaluation is markedly limited by motion artifact, and the patient could not complete the exam.  However, the limited diffusion MRI findings are most compatible with acute infarction involving the right  frontal and right temporal lobes as well as the left cerebellar hemisphere and left brainstem, as above.  No evidence of intracranial hemorrhage.    The impression was discussed with Dr. Urena by Dr. Benitez on behalf of Dr. Dove at 4:52 PM.      This report has been flagged in the [facility] medical record.    ______________________________________     Electronically signed by resident: AIRAM BENITEZ MD  Date:     05/26/17  Time:    17:11            As the supervising and teaching physician, I personally reviewed the images and resident's interpretation and I agree with the findings.          Electronically signed by: ROSANGELA DOVE MD  Date:     05/26/17  Time:    17:11              Narrative:    MRI brain with and without contrast.    05/26/17 16:07:49.    Accession# [id number].    CLINICAL INDICATION: 57 year old M with stroke.    TECHNIQUE: Multiplanar multisequence MR imaging of the brain was performed before and after the administration of intravenous contrast.     COMPARISON: CT head 5/23/2017 and brain MRI 5/13/2017.  FINDINGS:  Areas of restricted diffusion are noted bilaterally involving the right perisylvian frontal lobe, right anterior insular cortex, right temporal lobe, left brachium pontis, left anterior jose and left cerebellum, compatible with acute infarction.    The ventricles are stable in size and configuration without evidence of hydrocephalus.  Multiple foci demonstrating high T2/FLAIR signal intensity are noted scattered throughout the supratentorial matter, likely representing sequela of chronic microvascular ischemic changes.  Several chronic infarcts are noted.  There is no evidence of intracranial hemorrhage or abnormal postcontrast parenchymal enhancement allowing for limitation by motion artifact.    No definite extra-axial blood or fluid collections.     T2 skull base flow voids are preserved. Bone marrow signal intensity is unremarkable.                               Microbiology:  Microbiology Results (last 7 days)     Procedure Component Value Units Date/Time    Blood culture [[id number]] Collected:  05/26/17 1914    Order Status:  Completed Specimen:  Blood Updated:  05/27/17 2212     Blood Culture, Routine No Growth to date     Blood Culture, Routine No Growth to date    Narrative:       Collection has been rescheduled by KMG1 at 5/26/2017 16:42 Reason:   patient out for testing   Collection has been rescheduled by KMG1 at 5/26/2017 16:42 Reason:   patient out for testing     Blood culture [[id number]] Collected:  05/26/17 1905    Order Status:  Completed Specimen:  Blood Updated:  05/27/17 2212     Blood Culture, Routine No Growth to date     Blood Culture, Routine No Growth to date    Narrative:       Collection has been rescheduled by KMG1 at 5/26/2017 16:42 Reason:   patient out for testing   Collection has been rescheduled by KMG1 at 5/26/2017 16:42 Reason:   patient out for testing     Urine culture [[id number]]     Order Status:  Canceled Specimen:  Urine           ASSESSMENT/PLAN:     Active Hospital Problems    Diagnosis  POA    *Encephalopathy [G93.40]  Yes    Acute blood loss anemia [D62]  Unknown    Acute encephalopathy [G93.40]  Unknown    Leukocytosis [D72.829]  Yes    Diplopia [H53.2]  Yes    Multiple sclerosis [G35]  Yes    Fall [W19.XXXA]  Unknown    Syncope [R55]  Yes    Type 2 diabetes mellitus with diabetic polyneuropathy, with long-term current use of insulin [E11.42, Z79.4]  Not Applicable    Ataxia [R27.0]  Yes    Demyelinating changes in brain [G37.9]  Yes     By mri.  Several active lesions, many old.  5/13: MRA brain/neck, MRI brain w/wo contrast show no vascular occlusion, multiple foci of hyperintensity in deep cerebral periventricular white matter in pattern of demyelinating process.   5/17: MRI spine performed, no spinal cord lesions.     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  5/17: MRI spine performed, no spinal cord lesions.      NAFLD (nonalcoholic fatty liver disease) [K76.0]  Yes     Chronic    Type 2 diabetes mellitus with hyperglycemia, with long-term current use of insulin [E11.65, Z79.4]  Not Applicable    Obesity [E66.9]  Yes     Chronic    Sleep apnea [G47.30]  Yes     Chronic    Depressive disorder, not elsewhere classified [F32.9]  Yes     Chronic    Essential hypertension [I10]  Yes     Chronic    Mixed hyperlipidemia [E78.2]  Yes     Chronic      Resolved Hospital Problems    Diagnosis Date Resolved POA   No resolved problems to display.       ASSESSMENT: Bessy Nunez is a 57 y.o. male with PMH of poorly controlled Type 2 diabetes on insulin, HTN, mixed HLP and recently diagnosed with demyelinating disease [place] [facility] 2 weeks ago and [place] [facility] after receiving 5 days of high dose IV Solumedrol. Patient was discharged on Medrol dosepak.  Patient according to Dr. Cho states double vision was improving and getting better until this am when had a severe episode of double vision and dizziness and blacked out at home. Patient came around but was complaining of severe double vision and wife noted patient also confused after coming around so went to [facility]. Patient fell on the way to the bathroom. He does not remember falling, nor having any prodrome. Wife states she heard the thump and found him sitting on the floor, staring blankly into space, and confused.     PLAN:  Syncope  Encephalopathy  Possible Stroke   - Most likely 2/2 dehydration/ hypovolemia in setting of hyperglycemia (especially on steroids). Other ddx includes weakness/miscoordination 2/2 newly diagnosed demyelinating disease, seizure (no history); stroke is less likely given  negative head CT, cardiac arrhythmia less likely given normal EKG.   - Received 1L IVF at OSH.  - Fall precautions, seizure precautions.   - PT/OT given weakness.    - worsening encephalopathy 5/26: MRI findings are most compatible with acute infarction involving the right frontal and right temporal lobes as well as the left cerebellar hemisphere and left brainstem.  No evidence of intracranial hemorrhage.   -holding sertraline.     - Appreciate stroke service recs:  Acute stroke vs demyelinating lesion, however, will proceed to treat as acute stroke.  Repeat MRI brain on 5/29 (will do on 5/29)  Repeat LP  Repeat MRI spine to look for spinal lesions (will do on 5/29)  Continue aspirin 81 and atorvastatin 40 (ordered)  SBP <220 (monitoring)  Sq heparin for DVT prophylaxis (being held for possible LP)  PT/OT and speech to evaluate and treat (ordered)  Neuro checks q2-4h (q2H)     - Appreciate neurology recs:   - EEG negative for seizures, per neuro. On prophylactic keppra.  - negative tests: ammonia, abd US, hepatitis panel, WNV, C3, C4, lyme, HIV, REYMUNDO, anti-dsDNA, SPEP, B12, beta 2 microglobulin, heavy metals, UA  - positive tests: elevated retic, CRP, and ESR.  - pending tests: ANCA, cryoglobulin, B1, B6, paraneoplastic autoantibody, encephalopathy autoimmune panel, cardiolipin antibody, phosphatidylserine Ab  - Defer further steroid use to Neurology.     - leukocytosis could also indicate underlying process contributing to encephalopathy. Afebrile, ESR/CRP mildly elevated.  Cultures NGTD, on empiric rocephin since 5/26, WBC improved with rocephin.  ID consult for further recs.  - CT chest/abd/pelvis negative for acute infection or malignancies  - GIOVANNI ordered to rule out septic emboli to brain.  - LP pending     Upper GI Bleed  -started 5/27, dark tarry stool confirmed by FOBT for blood  -consent obtained for PRBC, 1U ordered, protonix 40 IV BID, two large bore IVs, consulted GI, likely EGD on 5/29.  -CBC q8H,  "hemodynamics stable.    Type 2 diabetes mellitus with diabetic polyneuropathy, with long-term current use of insulin   - Hyperglycemic in setting of uncontrolled insulin dependent DM and steroid use.   - DM diet.   - Home regimen includes: tresiba 42U qhs, victoza 1.8mg once daily, metformin.   - Last hospitalization was on Detemir 32U qhs, and Aspart 22U TIDWM.   - titrating insulin, currently NPO due to encephalopathy and risk for aspiration     Diplopia   - Originally presenting symptom of demyelinating disease.   - Resolved.  - Diplopia following syncope may have been "unmasking" as opposed to acute neurologic problem.      Leukocytosis   - most likely 2/2 steroids, especially given no s/sx of infection.   - worsened 5/26 in setting of worsening encephalopathy  - BCx NGTD, UA pending, on empiric rocephin started 5/27.     Essential hypertension   - Cont home: ASA, Atenolol, Diltiazem, Valsartan.  - at goal, continue to monitor      Mixed hyperlipidemia   - Cont home Atorvastatin      Depressive disorder, not elsewhere classified   - holding zoloft     PT/OT: recommend outpatient PT/OT  Diet: NPO - risk of aspiration, SLP eval ordered  DVT PPx: hold chem dvt ppx for LP  Code Status: Full Code    Dispo:  Pending demyelinating disease workup (MRI, GIOVANNI 5/29) and GI bleed control (EGD 5/29).     Patient seen and discussed with Dr. Elvira Hendricks MD during rounds.    Guanaco Story MD  PGY-1  Pager: [phone]    "

## 2017-05-28 NOTE — PROGRESS NOTES
Dr. Story notified that blood transfusion was initiated per his request. Discussed patient's mental status - patient more lethargic this am, arousable to touch. Vital signs remain stable. Will continue to monitor.

## 2017-05-28 NOTE — HPI
Patient is a 56 y/o male with poorly controlled Type 2 diabetes on insulin, HTN, mixed HLP and recently diagnosed with multiple sclerosis here at [facility] 2 weeks ago and discharged last week from [facility] from Hospital Medicine Team 3 after receiving 5 days of high dose IV Solumedrol. Patient was discharged on Medrol dospepak. Patient according to Dr. Cho states double vision was improving and getting better until this 5/24 when had a severe episode of double vision and dizziness and blacked out at home. Patient came around but was complaining of severe double vision and wife noted patient also confused after coming around so went to [facility]. Patient fell on the way to the bathroom. He does not remember falling, nor having any prodrome. Wife states she heard the thump and found him sitting on the floor, staring blankly into space, and confused.      At [facility], patient noted to have WBC 19, but is on steroids. Blood culture sent and CXR done that was unremarkable. CT imaging of head unremarkable for any new findings. Patient noted to have  and BUN/Cr of 40/1.1 so Dr. Cho felt patient may just be dehydrated so patient given 1 liter of NS in ER. Patient feeling better. He spoke with Neurology here at [facility] who recommended admit for evaluation due to worsening double vision but Dr. Cho thinks likely just related to dehydration as patient has been having high blood sugars at home and during recent hospitalization.      Pt admitted to hospital medicine 5/24.

## 2017-05-28 NOTE — ASSESSMENT & PLAN NOTE
-24 hr EEG completed- non-focal slowing- indicative of encephalopathy   -keppra 500 BID  -MRI findings concerning for ischemic event vs active demyelination  -MRI shows lesions suggestive of infarct in the R frontal and temporal lobes as well as the left cerebellar hemisphere and left brainstem, correlating with multiple vascular territories.   -rule out toxic metabolic etiologies  -Cultures thus far have been negative. Repeat blood cultures x 2   -rocephin 1g qd  -Repeat UA negative.    -Vascular Neurology is following.  -echo pending as cardio-embolic phenomenon can present with ischemic events in multiple vascular territories  -GI to perform EGD tomorrow 2/2 UGIbleed/large melanotic stool, on protonix ggt, after EGD will schedule GIOVANNI if appropriate  -work up for auto-immune causes of clinical presentation.   -repeat MRI brain and spine scheduled for 5/29  -stat CT for neuro change   -PT OT SLP

## 2017-05-28 NOTE — HOSPITAL COURSE
5/24: admitted to hospital medicine for MS flare/worsening double vision  5/28: -- waxing and waning of mental status throughout the day. MRI findings concerning for ischemic event vs active demyelination, with ADC correlation of DWI abnormality. MRI shows lesions suggestive of infarct in the R frontal and temporal lobes as well as the left cerebellar hemisphere and left brainstem, correlating with multiple vascular territories. .  5/29 EGD: Large duodenal ulcer & Multiple small clean based gastric ulcers treated with bipolar cautery.  5/30 Step Down to IM with Neurology following for MS, awaiting bed  5/31 Boarding for IM, plans for GIOVANNI today.

## 2017-05-28 NOTE — ASSESSMENT & PLAN NOTE
-Improving  -Received 5.5g solumedrol last admission  -May be 2/2 MS (suspected, but not confirmed)

## 2017-05-28 NOTE — NURSING
Pt arrived to unit via stretcher on tele monitor with RN x3. Pt sent on NS and protonix drip. NP with [facility] notified of pt arrival. Stated will be at bedside to assess.

## 2017-05-28 NOTE — ASSESSMENT & PLAN NOTE
-As seen on MRIs from 5/13, no spinal cord lesions  -S/p 5.5g solumedrol last admission  -Discussed with MS team - will need outpatient f/u appointment

## 2017-05-28 NOTE — PROGRESS NOTES
Vascular neurology at bedside.     Patient moving both right and left legs at this time. Patient awake and alert - able to follow directions to lift legs off bed but not to raise arms. MD reviewed plan for GIOVANNI as well as EGD tomorrow.

## 2017-05-28 NOTE — PROGRESS NOTES
[facility]  Neurology  Progress Note    Patient Name: Bessy Nunez  MRN: [id number]  Admission Date: 5/23/2017  Hospital Length of Stay: 5 days  Code Status: Full Code   Attending Provider: Elvira Hendricks MD  Primary Care Physician: Edgar Nova MD   Principal Problem:Encephalopathy      Subjective:     Interval History: Patient continues to wax and wane. Concern for R sided weakness earlier this morning; resolved for my exam.      Current Neurological Medications: Keppra     Current Facility-Administered Medications   Medication Dose Route Frequency Provider Last Rate Last Dose    aspirin EC tablet 81 mg  81 mg Oral Daily Donna Jason MD   81 mg at 05/26/17 0830    atenolol tablet 50 mg  50 mg Oral Daily Donna Jason MD   50 mg at 05/26/17 0829    atorvastatin tablet 40 mg  40 mg Oral Daily Donna Jason MD   40 mg at 05/26/17 0828    cefTRIAXone (ROCEPHIN) 1 g in dextrose 5 % 50 mL IVPB  1 g Intravenous Q24H Josefina Urena MD   1 g at 05/26/17 2223    dextrose 50% injection 12.5 g  12.5 g Intravenous PRN Donna Jason MD        dextrose 50% injection 25 g  25 g Intravenous PRN Donna Jason MD        diltiaZEM 24 hr capsule 240 mg  240 mg Oral Daily Donna Jason MD   240 mg at 05/26/17 0828    glucagon (human recombinant) injection 1 mg  1 mg Intramuscular PRN Donna Jason MD        glucose chewable tablet 16 g  16 g Oral PRN Donna Jason MD        glucose chewable tablet 24 g  24 g Oral PRN Donna Jason MD        insulin aspart pen 0-5 Units  0-5 Units Subcutaneous QID (AC + HS) PRN Donna Jason MD   2 Units at 05/26/17 0839    insulin aspart pen 18 Units  18 Units Subcutaneous TIDWM Josefina Urena MD   18 Units at 05/26/17 1232    insulin detemir pen 40 Units  40 Units Subcutaneous QHS Josefina Urena MD   40 Units at  05/26/17 2215    levetiracetam in NaCl (iso-os) IVPB 1,000 mg  1,000 mg Intravenous Q12H Michelle Jhaveri  mL/hr at 05/27/17 0955 1,000 mg at 05/27/17 0955    valsartan tablet 320 mg  320 mg Oral Daily Donna Komal Jason MD   320 mg at 05/26/17 0837    vitamin D 1000 units tablet 5,000 Units  5,000 Units Oral Daily Guanaco Story MD   5,000 Units at 05/26/17 0846       Review of Systems   Constitutional: Positive for activity change.   Respiratory: Negative for shortness of breath.    Cardiovascular: Negative for chest pain.   Gastrointestinal: Negative for nausea and vomiting.   Skin: Negative for rash and wound.   Neurological: Positive for headaches.        Periods of decreased responsiveness    Psychiatric/Behavioral: Positive for decreased concentration. Negative for agitation and behavioral problems.     Objective:     Vital Signs (Most Recent):  Temp: 98.1 °F (36.7 °C) (05/27/17 1105)  Pulse: 76 (05/27/17 1105)  Resp: 20 (05/27/17 0943)  BP: 97/60 (05/27/17 1105)  SpO2: 100 % (05/27/17 1105) Vital Signs (24h Range):  Temp:  [97.8 °F (36.6 °C)-98.5 °F (36.9 °C)] 98.1 °F (36.7 °C)  Pulse:  [62-92] 76  Resp:  [15-20] 20  SpO2:  [96 %-100 %] 100 %  BP: ()/(46-65) 97/60     Weight: 118.4 kg (261 lb 0.4 oz)  Body mass index is 36.41 kg/m².    Physical Exam   Constitutional: He appears well-developed and well-nourished. No distress.   HENT:   Head: Normocephalic and atraumatic.   Pulmonary/Chest: Effort normal.   Neurological: He is alert.   Skin: Skin is warm and dry. He is not diaphoretic.   Psychiatric: His speech is normal.   Slightly withdrawn.  Less expressive than last admission.  Paucity of speech.   Nursing note and vitals reviewed.      NEUROLOGICAL EXAMINATION:     MENTAL STATUS   Attention: normal.   Speech: speech is normal   Level of consciousness: alert    CRANIAL NERVES     CN II   Visual fields full to confrontation.     CN V   Facial sensation intact.     CN VII    Facial expression full, symmetric.     CN VIII   Hearing: intact    CN XI   Right sternocleidomastoid strength: normal  Left sternocleidomastoid strength: normal    CN XII   Tongue: not atrophic  Fasciculations: absent  Tongue deviation: none       Mild L JAYDEN.  Improving.     MOTOR EXAM   Muscle bulk: normal  Overall muscle tone: normal       More difficulty following commands today.  Perseverates.  Moves all extremities against gravity.     SENSORY EXAM   Light touch normal.       Significant Labs:   Hemoglobin A1c:   Recent Labs  Lab 05/13/17  0138   HGBA1C 11.4*     Blood Culture:   Recent Labs  Lab 05/26/17  1905 05/26/17  1914   LABBLOO No Growth to date No Growth to date     BMP:   Recent Labs  Lab 05/26/17  0556 05/26/17  1502 05/27/17  0500   * 95 88    140 140   K 4.3 4.0 3.9    108 109   CO2 26 25 25   BUN 26* 23* 18   CREATININE 0.9 0.9 0.8   CALCIUM 8.0* 8.4* 7.8*   MG  --  1.6  --      CBC:   Recent Labs  Lab 05/26/17  1502 05/26/17  1905 05/27/17  0500   WBC 19.09* 14.22* 12.36   HGB 8.1* 7.3* 7.2*   HCT 22.9* 20.9* 21.2*    153 155     CMP:   Recent Labs  Lab 05/26/17  0556 05/26/17  1502 05/27/17  0500   * 95 88    140 140   K 4.3 4.0 3.9    108 109   CO2 26 25 25   BUN 26* 23* 18   CREATININE 0.9 0.9 0.8   CALCIUM 8.0* 8.4* 7.8*   MG  --  1.6  --    PROT 4.5* 5.1* 4.9*   ALBUMIN 2.2* 2.5* 2.4*   BILITOT 0.4 0.5 0.5   ALKPHOS 91 97 90   AST 55* 43* 40   ALT 67* 66* 57*   ANIONGAP 6* 7* 6*   EGFRNONAA >60.0 >60.0 >60.0     CSF Culture: No results for input(s): CSFCULTURE in the last 48 hours.  CSF Studies: No results for input(s): ALIQUT, APPEARCSF, COLORCSF, CSFWBC, CSFRBC, GLUCCSF, LDHCSF, PROTEINCSF, VDRLCSF in the last 48 hours.  Inflammatory Markers: No results for input(s): SEDRATE, CRP, PROCAL in the last 48 hours.  POCT Glucose:   Recent Labs  Lab 05/26/17  1427 05/26/17  1810 05/26/17  2214   POCTGLUCOSE 103 126* 152*     Respiratory Culture: No  results for input(s): GSRESP, RESPIRATORYC in the last 48 hours.  Urine Culture: No results for input(s): LABURIN in the last 48 hours.  Urine Studies: No results for input(s): COLORU, APPEARANCEUA, PHUR, SPECGRAV, PROTEINUA, GLUCUA, KETONESU, BILIRUBINUA, OCCULTUA, NITRITE, UROBILINOGEN, LEUKOCYTESUR, RBCUA, WBCUA, BACTERIA, SQUAMEPITHEL, HYALINECASTS in the last 48 hours.    Invalid input(s): WRIGHTSUR  Recent Lab Results       05/27/17  0926 05/27/17  0500 05/27/17  0009 05/26/17  2214 05/26/17  1914      Albumin  2.4(L)        Alkaline Phosphatase  90        Allens Test          ALT  57(H)        Ammonia   24       Anion Gap  6(L)        AST  40        Baso #  0.01        Basophil%  0.1        Total Bilirubin  0.5  Comment:  For infants and newborns, interpretation of results should be based  on gestational age, weight and in agreement with clinical  observations.  Premature Infant recommended reference ranges:  Up to 24 hours.............<8.0 mg/dL  Up to 48 hours............<12.0 mg/dL  3-5 days..................<15.0 mg/dL  6-29 days.................<15.0 mg/dL          Blood Culture, Routine     No Growth to date[P]     Site          BUN, Bld  18        Calcium  7.8(L)        Chloride  109        CO2  25        CPK          Creatinine  0.8        DelSys          Differential Method  Automated        eGFR if   >60.0        eGFR if non   >60.0  Comment:  Calculation used to obtain the estimated glomerular filtration  rate (eGFR) is the CKD-EPI equation. Since race is unknown   in our information system, the eGFR values for   -American and Non--American patients are given   for each creatinine result.          Eos #  0.1        Eosinophil%  0.6        Ferritin          Glucose  88        Gran #  9.1(H)        Gran%  73.2(H)        Hematocrit  21.2(L)        Hemoglobin  7.2(L)        Iron          Lymph #  2.3        Lymph%  18.8        Magnesium          MCH  29.3         MCHC  34.0        MCV  86        Mode          Mono #  0.7        Mono%  5.9        MPV  9.1(L)        Pathologist Review  Review required        Phosphorus          Platelet Estimate          Platelets  155        POC BE          POC HCO3          POC Lactate          POC PCO2          POC PH          POC PO2          POC SATURATED O2          POC TCO2          POCT Glucose    152(H)      Poly          Potassium  3.9        Total Protein  4.9(L)        Protein, Serum          RBC  2.46(L)        RDW  14.1        Retic          Sample          Saturated Iron          Sodium  140        TIBC          Transferrin          Vitamin B-12 907         WBC  12.36                    05/26/17  1905 05/26/17  1810 05/26/17  1502 05/26/17  1449 05/26/17  1427      Albumin   2.5(L)       Alkaline Phosphatase   97       Allens Test    Pass      ALT   66(H)       Ammonia          Anion Gap   7(L)       AST   43(H)       Baso # 0.01  CANCELED  Comment:  Result canceled by the ancillary       Basophil% 0.1  0.0       Total Bilirubin   0.5  Comment:  For infants and newborns, interpretation of results should be based  on gestational age, weight and in agreement with clinical  observations.  Premature Infant recommended reference ranges:  Up to 24 hours.............<8.0 mg/dL  Up to 48 hours............<12.0 mg/dL  3-5 days..................<15.0 mg/dL  6-29 days.................<15.0 mg/dL         Blood Culture, Routine No Growth to date[P]         Site    RR      BUN, Bld   23(H)       Calcium   8.4(L)       Chloride   108       CO2   25       CPK          Creatinine   0.9       Dels    Room Air      Differential Method Automated  Manual       eGFR if    >60.0       eGFR if non    >60.0  Comment:  Calculation used to obtain the estimated glomerular filtration  rate (eGFR) is the CKD-EPI equation. Since race is unknown   in our information system, the eGFR values for   -American and  Non--American patients are given   for each creatinine result.         Eos # 0.1  CANCELED  Comment:  Result canceled by the ancillary       Eosinophil% 0.5  0.0       Ferritin          Glucose   95       Gran # 10.2(H)         Gran% 71.9  67.0       Hematocrit 20.9(L)  22.9(L)       Hemoglobin 7.3(L)  8.1(L)       Iron          Lymph # 2.8  CANCELED  Comment:  Result canceled by the ancillary       Lymph% 19.6  30.0       Magnesium   1.6       MCH 29.6  30.0       MCHC 34.9  35.4       MCV 85  85       Mode    SPONT      Mono # 0.8  CANCELED  Comment:  Result canceled by the ancillary       Mono% 5.9  3.0(L)       MPV 8.8(L)  8.9(L)       Pathologist Review          Phosphorus   3.9       Platelet Estimate   Appears normal       Platelets 153  215       POC BE    -1      POC HCO3    24.1      POC Lactate    1.21      POC PCO2    38.3      POC PH    7.406      POC PO2    70(L)      POC SATURATED O2    94(L)      POC TCO2    25      POCT Glucose  126(H)   103     Poly   Occasional       Potassium   4.0       Total Protein   5.1(L)       Protein, Serum          RBC 2.47(L)  2.70(L)       RDW 13.8  13.9       Retic          Sample    ARTERIAL      Saturated Iron          Sodium   140       TIBC          Transferrin          Vitamin B-12          WBC 14.22(H)  19.09(H)                   05/26/17  1214 05/26/17  1138      Albumin       Alkaline Phosphatase       Allens Test       ALT       Ammonia       Anion Gap       AST       Baso #  0.01     Basophil%  0.1     Total Bilirubin       Blood Culture, Routine       Site       BUN, Bld       Calcium       Chloride       CO2       CPK  18(L)     Creatinine       DelSys       Differential Method  Automated     eGFR if        eGFR if non        Eos #  0.1     Eosinophil%  0.8     Ferritin  284     Glucose       Gran #  9.1(H)     Gran%  68.7     Hematocrit  21.4(L)     Hemoglobin  7.4(L)     Iron  66     Lymph #  3.2     Lymph%  24.2      Magnesium       MCH  29.4     MCHC  34.6     MCV  85     Mode       Mono #  0.6     Mono%  4.3     MPV  9.9     Pathologist Review  Review required     Phosphorus       Platelet Estimate       Platelets  121(L)     POC BE       POC HCO3       POC Lactate       POC PCO2       POC PH       POC PO2       POC SATURATED O2       POC TCO2       POCT Glucose 176(H)      Poly       Potassium       Total Protein       Protein, Serum  4.6(L)     RBC  2.52(L)     RDW  13.9     Retic  3.5(H)     Sample       Saturated Iron  22     Sodium       TIBC  300     Transferrin  203     Vitamin B-12       WBC  13.28(H)           Significant Imaging:  MRI brain 5/13/17:  Multiple foci of FLAIR hyperintensity in the deep cerebral periventricular white matter in a configuration and distribution which can be seen in the setting of underlying demyelinating process such as multiple sclerosis. There are several punctate foci of restricted diffusion including the left aspect of the midbrain tectum which in light of the aforementioned white matter changes may reflect regions of recent demyelination. Superimposed small acute infarcts would be difficult to exclude, although are felt less likely given the overall constellation of findings. Additionally, there is a 0.7 cm enhancing T2/FLAIR identified in the anterior right thalamus concerning for focus of active demyelination.           Also old lesions in corpus collosum         CTH 5/26/17  No detrimental change in this patient with volume loss, and multiple areas of hyperattenuation consistent with a infarcts, and periventricular hypoattenuation likely related to the patient's demyelinating disease.    MRI Brain 5/26/17  Please note evaluation is markedly limited by motion artifact, and the patient could not complete the exam.  However, the limited diffusion MRI findings are most compatible with acute infarction involving the right frontal and right temporal lobes as well as the left cerebellar  hemisphere and left brainstem, as above.  No evidence of intracranial hemorrhage.        Assessment and Plan:     Fall    24 hr EEG completed- non-focal slowing- indicative of encephalopathy         Diplopia    2/2 JAYDEN  Improving        Leukocytosis    Resolved.  Likely 2/2 steroids        Demyelinating changes in brain    As seen on MRIs from 5/13  S/p 5.5g solumedrol last admission  Discussed with MS team - will need outpatient f/u appointment        Internuclear ophthalmoplegia of left eye    Improving  Received 5.5g solumedrol last admission  May be 2/2 MS (suspected, but not confirmed)        Depressive disorder, not elsewhere classified    Continue antidepressant.  Consider titrating for better symptom control.        * Encephalopathy    Patient demonstrates waxing and waning of mental status throughout the day. MRI findings concerning for ischemic event vs active demyelination, with ADC correlation of DWI abnormality. MRI shows lesions suggestive of infarct in the R frontal and temporal lobes as well as the left cerebellar hemisphere and left brainstem, correlating with multiple vascular territories. Will need to rule out toxic metabolic etiologies. Cultures thus far have been negative. Repeat UA negative.  Vascular Neurology is following. Please consider GIOVANNI as cardio-embolic phenomenon can present with ischemic events in multiple vascular territories. Will also obtain addition labs for auto-immune causes of clinical presentation.     F/u  anti-Phospholipid ab/cardiolipin ab  Decrease keppra to 500mg bid  GIOVANNI, as above  F/u vascular neurology recs.  Consider repeat MRI Brain today/tomorrow    F/u lyme titer (neg), WNV (neg), SSA (neg) , SSB (neg), dsDNA, vasculitis labs, ESR 15, CRP 5.37, HIV neg, REYMUNDO, as well as an autoimmune encephalopathy panel    24h EEG 5/26 negative for simple partial seizures / underlying etiology of waxing and waning MS            VTE Risk Mitigation         Ordered     Place  sequential compression device  Until discontinued      05/24/17 0303     Medium Risk of VTE  Once      05/24/17 0303          Sharon Luong MD  Neurology  [facility]    I have personally taken the history and examined the patient and agree with the resident's note as stated above.    Aldo Mercedes MD, JONATAN, FAAN  Department of Neurology   [facility] [place], [place]

## 2017-05-29 ENCOUNTER — ANESTHESIA EVENT (OUTPATIENT)
Dept: ENDOSCOPY | Facility: HOSPITAL | Age: 57
DRG: 102 | End: 2017-05-29
Payer: MEDICAID

## 2017-05-29 ENCOUNTER — SURGERY (OUTPATIENT)
Age: 57
End: 2017-05-29

## 2017-05-29 ENCOUNTER — ANESTHESIA (OUTPATIENT)
Dept: ENDOSCOPY | Facility: HOSPITAL | Age: 57
DRG: 102 | End: 2017-05-29
Payer: MEDICAID

## 2017-05-29 VITALS — RESPIRATION RATE: 12 BRPM

## 2017-05-29 DIAGNOSIS — K26.4 DUODENAL ULCER HEMORRHAGE: Primary | ICD-10-CM

## 2017-05-29 LAB
ALBUMIN SERPL BCP-MCNC: 2.5 G/DL
ALBUMIN SERPL ELPH-MCNC: 2.62 G/DL
ALP SERPL-CCNC: 81 U/L
ALPHA1 GLOB SERPL ELPH-MCNC: 0.23 G/DL
ALPHA2 GLOB SERPL ELPH-MCNC: 0.62 G/DL
ALT SERPL W/O P-5'-P-CCNC: 41 U/L
ANION GAP SERPL CALC-SCNC: 8 MMOL/L
AST SERPL-CCNC: 35 U/L
B-GLOBULIN SERPL ELPH-MCNC: 0.69 G/DL
BASOPHILS # BLD AUTO: 0.01 K/UL
BASOPHILS # BLD AUTO: 0.01 K/UL
BASOPHILS NFR BLD: 0.1 %
BASOPHILS NFR BLD: 0.1 %
BILIRUB SERPL-MCNC: 1 MG/DL
BUN SERPL-MCNC: 7 MG/DL
CALCIUM SERPL-MCNC: 8.3 MG/DL
CHLORIDE SERPL-SCNC: 111 MMOL/L
CK SERPL-CCNC: 18 U/L
CO2 SERPL-SCNC: 21 MMOL/L
CREAT SERPL-MCNC: 0.8 MG/DL
DIFFERENTIAL METHOD: ABNORMAL
DIFFERENTIAL METHOD: ABNORMAL
EOSINOPHIL # BLD AUTO: 0.1 K/UL
EOSINOPHIL # BLD AUTO: 0.1 K/UL
EOSINOPHIL NFR BLD: 0.8 %
EOSINOPHIL NFR BLD: 1.4 %
ERYTHROCYTE [DISTWIDTH] IN BLOOD BY AUTOMATED COUNT: 13.9 %
ERYTHROCYTE [DISTWIDTH] IN BLOOD BY AUTOMATED COUNT: 14.3 %
EST. GFR  (AFRICAN AMERICAN): >60 ML/MIN/1.73 M^2
EST. GFR  (NON AFRICAN AMERICAN): >60 ML/MIN/1.73 M^2
FERRITIN SERPL-MCNC: 284 NG/ML
GAMMA GLOB SERPL ELPH-MCNC: 0.43 G/DL
GLUCOSE SERPL-MCNC: 101 MG/DL
HCT VFR BLD AUTO: 21.4 %
HCT VFR BLD AUTO: 28.2 %
HGB BLD-MCNC: 7.4 G/DL
HGB BLD-MCNC: 9.1 G/DL
HGB BLD-MCNC: 9.4 G/DL
HGB BLD-MCNC: 9.6 G/DL
IRON SERPL-MCNC: 66 UG/DL
LYMPHOCYTES # BLD AUTO: 1.7 K/UL
LYMPHOCYTES # BLD AUTO: 3.2 K/UL
LYMPHOCYTES NFR BLD: 24.2 %
LYMPHOCYTES NFR BLD: 24.3 %
MAGNESIUM SERPL-MCNC: 1.6 MG/DL
MCH RBC QN AUTO: 29.4 PG
MCH RBC QN AUTO: 29.6 PG
MCHC RBC AUTO-ENTMCNC: 34 %
MCHC RBC AUTO-ENTMCNC: 34.6 %
MCV RBC AUTO: 85 FL
MCV RBC AUTO: 87 FL
MONOCYTES # BLD AUTO: 0.5 K/UL
MONOCYTES # BLD AUTO: 0.6 K/UL
MONOCYTES NFR BLD: 4.3 %
MONOCYTES NFR BLD: 6.5 %
NEUTROPHILS # BLD AUTO: 4.8 K/UL
NEUTROPHILS # BLD AUTO: 9.1 K/UL
NEUTROPHILS NFR BLD: 67.3 %
NEUTROPHILS NFR BLD: 68.7 %
PATH REV BLD -IMP: NORMAL
PATH REV BLD -IMP: NORMAL
PATHOLOGIST INTERPRETATION SPE: NORMAL
PHOSPHATE SERPL-MCNC: 3.4 MG/DL
PLATELET # BLD AUTO: 118 K/UL
PLATELET # BLD AUTO: 121 K/UL
PMV BLD AUTO: 8.6 FL
PMV BLD AUTO: 9.9 FL
POCT GLUCOSE: 111 MG/DL (ref 70–110)
POCT GLUCOSE: 111 MG/DL (ref 70–110)
POCT GLUCOSE: 125 MG/DL (ref 70–110)
POCT GLUCOSE: 261 MG/DL (ref 70–110)
POTASSIUM SERPL-SCNC: 3.5 MMOL/L
PROT SERPL-MCNC: 4.6 G/DL
PROT SERPL-MCNC: 5.1 G/DL
RBC # BLD AUTO: 2.52 M/UL
RBC # BLD AUTO: 3.24 M/UL
RETICS/RBC NFR AUTO: 3.5 %
SATURATED IRON: 22 %
SODIUM SERPL-SCNC: 140 MMOL/L
TOTAL IRON BINDING CAPACITY: 300 UG/DL
TRANSFERRIN SERPL-MCNC: 203 MG/DL
WBC # BLD AUTO: 13.28 K/UL
WBC # BLD AUTO: 7.08 K/UL

## 2017-05-29 PROCEDURE — 63600175 PHARM REV CODE 636 W HCPCS: Performed by: HOSPITALIST

## 2017-05-29 PROCEDURE — 63600175 PHARM REV CODE 636 W HCPCS: Performed by: INTERNAL MEDICINE

## 2017-05-29 PROCEDURE — 37000009 HC ANESTHESIA EA ADD 15 MINS: Performed by: INTERNAL MEDICINE

## 2017-05-29 PROCEDURE — 63600175 PHARM REV CODE 636 W HCPCS: Performed by: STUDENT IN AN ORGANIZED HEALTH CARE EDUCATION/TRAINING PROGRAM

## 2017-05-29 PROCEDURE — 27201038 HC PROBE, BI-POLAR: Performed by: INTERNAL MEDICINE

## 2017-05-29 PROCEDURE — 25500020 PHARM REV CODE 255: Performed by: HOSPITALIST

## 2017-05-29 PROCEDURE — D9220A PRA ANESTHESIA: Mod: ANES,,, | Performed by: ANESTHESIOLOGY

## 2017-05-29 PROCEDURE — 25000003 PHARM REV CODE 250: Performed by: NURSE ANESTHETIST, CERTIFIED REGISTERED

## 2017-05-29 PROCEDURE — 83735 ASSAY OF MAGNESIUM: CPT

## 2017-05-29 PROCEDURE — 0D598ZZ DESTRUCTION OF DUODENUM, VIA NATURAL OR ARTIFICIAL OPENING ENDOSCOPIC: ICD-10-PCS | Performed by: INTERNAL MEDICINE

## 2017-05-29 PROCEDURE — 99232 SBSQ HOSP IP/OBS MODERATE 35: CPT | Mod: ,,, | Performed by: PSYCHIATRY & NEUROLOGY

## 2017-05-29 PROCEDURE — 43255 EGD CONTROL BLEEDING ANY: CPT | Performed by: INTERNAL MEDICINE

## 2017-05-29 PROCEDURE — 85025 COMPLETE CBC W/AUTO DIFF WBC: CPT

## 2017-05-29 PROCEDURE — 84100 ASSAY OF PHOSPHORUS: CPT

## 2017-05-29 PROCEDURE — 85018 HEMOGLOBIN: CPT

## 2017-05-29 PROCEDURE — 99291 CRITICAL CARE FIRST HOUR: CPT | Mod: ,,, | Performed by: PSYCHIATRY & NEUROLOGY

## 2017-05-29 PROCEDURE — 25000003 PHARM REV CODE 250: Performed by: HOSPITALIST

## 2017-05-29 PROCEDURE — 85018 HEMOGLOBIN: CPT | Mod: 91

## 2017-05-29 PROCEDURE — A9585 GADOBUTROL INJECTION: HCPCS | Performed by: HOSPITALIST

## 2017-05-29 PROCEDURE — 0D568ZZ DESTRUCTION OF STOMACH, VIA NATURAL OR ARTIFICIAL OPENING ENDOSCOPIC: ICD-10-PCS | Performed by: INTERNAL MEDICINE

## 2017-05-29 PROCEDURE — C9113 INJ PANTOPRAZOLE SODIUM, VIA: HCPCS | Performed by: HOSPITALIST

## 2017-05-29 PROCEDURE — 37000008 HC ANESTHESIA 1ST 15 MINUTES: Performed by: INTERNAL MEDICINE

## 2017-05-29 PROCEDURE — D9220A PRA ANESTHESIA: Mod: CRNA,,, | Performed by: NURSE ANESTHETIST, CERTIFIED REGISTERED

## 2017-05-29 PROCEDURE — 25000003 PHARM REV CODE 250: Performed by: STUDENT IN AN ORGANIZED HEALTH CARE EDUCATION/TRAINING PROGRAM

## 2017-05-29 PROCEDURE — 80053 COMPREHEN METABOLIC PANEL: CPT

## 2017-05-29 PROCEDURE — 99222 1ST HOSP IP/OBS MODERATE 55: CPT | Mod: ,,, | Performed by: INTERNAL MEDICINE

## 2017-05-29 PROCEDURE — 63600175 PHARM REV CODE 636 W HCPCS: Performed by: NURSE ANESTHETIST, CERTIFIED REGISTERED

## 2017-05-29 PROCEDURE — 20000000 HC ICU ROOM

## 2017-05-29 PROCEDURE — 94761 N-INVAS EAR/PLS OXIMETRY MLT: CPT

## 2017-05-29 PROCEDURE — 43255 EGD CONTROL BLEEDING ANY: CPT | Mod: ,,, | Performed by: INTERNAL MEDICINE

## 2017-05-29 RX ORDER — LIDOCAINE HCL/PF 100 MG/5ML
SYRINGE (ML) INTRAVENOUS
Status: DISCONTINUED | OUTPATIENT
Start: 2017-05-29 | End: 2017-05-29

## 2017-05-29 RX ORDER — SODIUM CHLORIDE 9 MG/ML
INJECTION, SOLUTION INTRAVENOUS CONTINUOUS PRN
Status: DISCONTINUED | OUTPATIENT
Start: 2017-05-29 | End: 2017-05-29

## 2017-05-29 RX ORDER — GADOBUTROL 604.72 MG/ML
10 INJECTION INTRAVENOUS
Status: COMPLETED | OUTPATIENT
Start: 2017-05-29 | End: 2017-05-29

## 2017-05-29 RX ORDER — PROPOFOL 10 MG/ML
VIAL (ML) INTRAVENOUS
Status: DISCONTINUED | OUTPATIENT
Start: 2017-05-29 | End: 2017-05-29

## 2017-05-29 RX ORDER — PROPOFOL 10 MG/ML
VIAL (ML) INTRAVENOUS CONTINUOUS PRN
Status: DISCONTINUED | OUTPATIENT
Start: 2017-05-29 | End: 2017-05-29

## 2017-05-29 RX ADMIN — LIDOCAINE HYDROCHLORIDE 100 MG: 20 INJECTION, SOLUTION INTRAVENOUS at 03:05

## 2017-05-29 RX ADMIN — PROPOFOL 30 MG: 10 INJECTION, EMULSION INTRAVENOUS at 03:05

## 2017-05-29 RX ADMIN — DILTIAZEM HYDROCHLORIDE 60 MG: 60 TABLET, FILM COATED ORAL at 06:05

## 2017-05-29 RX ADMIN — SODIUM CHLORIDE: 0.9 INJECTION, SOLUTION INTRAVENOUS at 03:05

## 2017-05-29 RX ADMIN — DEXTROSE 8 MG/HR: 50 INJECTION, SOLUTION INTRAVENOUS at 05:05

## 2017-05-29 RX ADMIN — DILTIAZEM HYDROCHLORIDE 60 MG: 60 TABLET, FILM COATED ORAL at 09:05

## 2017-05-29 RX ADMIN — DEXTROSE 8 MG/HR: 50 INJECTION, SOLUTION INTRAVENOUS at 12:05

## 2017-05-29 RX ADMIN — PROPOFOL 80 MG: 10 INJECTION, EMULSION INTRAVENOUS at 03:05

## 2017-05-29 RX ADMIN — GADOBUTROL 10 ML: 604.72 INJECTION INTRAVENOUS at 04:05

## 2017-05-29 RX ADMIN — PROPOFOL 50 MG: 10 INJECTION, EMULSION INTRAVENOUS at 03:05

## 2017-05-29 RX ADMIN — CEFTRIAXONE 1 G: 1 INJECTION, SOLUTION INTRAVENOUS at 09:05

## 2017-05-29 RX ADMIN — PROPOFOL 20 MCG/KG/MIN: 10 INJECTION, EMULSION INTRAVENOUS at 03:05

## 2017-05-29 RX ADMIN — LEVETIRACETAM 500 MG: 5 INJECTION INTRAVENOUS at 09:05

## 2017-05-29 RX ADMIN — LEVETIRACETAM 500 MG: 5 INJECTION INTRAVENOUS at 08:05

## 2017-05-29 RX ADMIN — INSULIN ASPART 3 UNITS: 100 INJECTION, SOLUTION INTRAVENOUS; SUBCUTANEOUS at 11:05

## 2017-05-29 RX ADMIN — DEXTROSE 8 MG/HR: 50 INJECTION, SOLUTION INTRAVENOUS at 08:05

## 2017-05-29 NOTE — PROGRESS NOTES
[facility]  Neurology  Progress Note    Patient Name: Bessy Nunez  MRN: [id number]  Admission Date: 5/23/2017  Hospital Length of Stay: 6 days  Code Status: Full Code   Attending Provider: Elvira Hendricks MD  Primary Care Physician: Edgar Nova MD   Principal Problem:Encephalopathy      Subjective:     Interval History: No new events overnight    Current Neurological Medications:     Current Facility-Administered Medications   Medication Dose Route Frequency Provider Last Rate Last Dose    0.9%  NaCl infusion (for blood administration)   Intravenous Q24H PRN Guanaco Story MD        0.9%  NaCl infusion (for blood administration)   Intravenous Q24H PRN Sandra Reyes PA-C        0.9%  NaCl infusion   Intravenous Continuous Elvira Hendricks MD 75 mL/hr at 05/29/17 0900      acetaminophen tablet 650 mg  650 mg Oral Q6H PRN Gilberto Díaz MD   650 mg at 05/28/17 1243    atorvastatin tablet 40 mg  40 mg Oral Daily Donna Jason MD   40 mg at 05/28/17 1243    cefTRIAXone (ROCEPHIN) 1 g in dextrose 5 % 50 mL IVPB  1 g Intravenous Q24H Josefina Urena MD   1 g at 05/28/17 1945    dextrose 50% injection 12.5 g  12.5 g Intravenous PRN Donna Jason MD   12.5 g at 05/28/17 0936    dextrose 50% injection 25 g  25 g Intravenous PRN Donna Jason MD        diltiaZEM tablet 60 mg  60 mg Oral Q8H Guanaco Story MD   60 mg at 05/29/17 0635    glucagon (human recombinant) injection 1 mg  1 mg Intramuscular PRN Donna Jason MD        glucose chewable tablet 16 g  16 g Oral PRN Donna Jason MD        glucose chewable tablet 24 g  24 g Oral PRN Donna Jason MD        insulin aspart pen 0-5 Units  0-5 Units Subcutaneous QID (AC + HS) PRN Donna Jason MD   2 Units at 05/26/17 0839    insulin aspart pen 18 Units  18 Units Subcutaneous TIDWM oJsefina Urena MD   18 Units at  05/27/17 1817    insulin detemir pen 35 Units  35 Units Subcutaneous QHS Guanaco Story MD   35 Units at 05/28/17 2023    levetiracetam in NaCl (iso-os) IVPB 500 mg  500 mg Intravenous Q12H Guanaco Story  mL/hr at 05/29/17 0831 500 mg at 05/29/17 0831    omnipaque oral solution 15 mL  15 mL Oral PRN Horace Hernandez MD        pantoprazole 40 mg in dextrose 5 % 100 mL infusion (ready to mix system)  8 mg/hr Intravenous Continuous Elvira Hendricks MD 20 mL/hr at 05/29/17 0900 8 mg/hr at 05/29/17 0900    sodium chloride 0.9% bolus 1,000 mL  1,000 mL Intravenous Once Guanaco Story MD        vitamin D 1000 units tablet 5,000 Units  5,000 Units Oral Daily Guanaco Story MD   5,000 Units at 05/28/17 1244       Review of Systems  Objective:     Vital Signs (Most Recent):  Temp: 98.6 °F (37 °C) (05/29/17 0705)  Pulse: 67 (05/29/17 0900)  Resp: 14 (05/29/17 0900)  BP: (!) 154/79 (05/29/17 0900)  SpO2: 99 % (05/29/17 0900) Vital Signs (24h Range):  Temp:  [97.6 °F (36.4 °C)-98.8 °F (37.1 °C)] 98.6 °F (37 °C)  Pulse:  [63-83] 67  Resp:  [12-20] 14  SpO2:  [95 %-100 %] 99 %  BP: (109-168)/(49-92) 154/79     Weight: 118.4 kg (261 lb 0.4 oz)  Body mass index is 36.41 kg/m².    Physical Exam     He's awake and alert. Will answer some questions, but not others. Moving all extremities.         Assessment and Plan:     Fall    24 hr EEG completed- non-focal slowing- indicative of encephalopathy         Diplopia    2/2 JAYDEN  Improving        Leukocytosis    Resolved.  Likely 2/2 steroids        Demyelinating changes in brain    As seen on MRIs from 5/13  S/p 5.5g solumedrol last admission  Discussed with MS team - will need outpatient f/u appointment        Internuclear ophthalmoplegia of left eye    Improving  Received 5.5g solumedrol last admission  May be 2/2 MS (suspected, but not confirmed)        Depressive disorder, not elsewhere classified    Continue antidepressant.  Consider titrating  for better symptom control.        * Encephalopathy    Patient demonstrates waxing and waning of mental status throughout the day. MRI findings concerning for ischemic event vs active demyelination, with ADC correlation of DWI abnormality.  Most recent MRI does not appear to demonstrate new lesions. Currently,  lesions suggestive of infarct in the R frontal and temporal lobes as well as the left cerebellar hemisphere and left brainstem, correlating with multiple vascular territories. Will need to rule out toxic metabolic etiologies. Cultures thus far have been negative. Repeat UA negative.  Vascular Neurology is following. GIOVANNI planned after EGD.     F/u  anti-Phospholipid ab/cardiolipin ab  Decrease keppra to 500mg bid  GIOVANNI, as above  F/u vascular neurology recs.  Consider repeat MRI Brain today/tomorrow    F/u lyme titer (neg), WNV (neg), SSA (neg) , SSB (neg), dsDNA, vasculitis labs, ESR 15, CRP 5.37, HIV neg, REYMUNDO, as well as an autoimmune encephalopathy panel    24h EEG 5/26 negative for simple partial seizures / underlying etiology of waxing and waning MS            VTE Risk Mitigation         Ordered     Place sequential compression device  Until discontinued      05/24/17 0303     Medium Risk of VTE  Once      05/24/17 0303          Edgardo Mercedes MD  Neurology  [facility]

## 2017-05-29 NOTE — SUBJECTIVE & OBJECTIVE
"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    Mixed hyperlipidemia 11/9/2012    NAFLD (nonalcoholic fatty liver disease) 10/11/2013    CHASE (nonalcoholic steatohepatitis)     Obesity     Stroke     Type 2 diabetes mellitus with diabetic polyneuropathy, with long-term current use of insulin 5/14/2017       Past Surgical History:   Procedure Laterality Date    SKIN CANCER EXCISION         Review of patient's allergies indicates:  No Known Allergies    Medications:  Prescriptions Prior to Admission   Medication Sig    aspirin (ECOTRIN) 81 MG EC tablet Take 81 mg by mouth once daily.    atenolol (TENORMIN) 50 MG tablet Take 1 tablet (50 mg total) by mouth once daily.    atorvastatin (LIPITOR) 40 MG tablet Take 1 tablet (40 mg total) by mouth once daily.    diltiaZEM (DILACOR XR) 240 MG CDCR Take 1 capsule (240 mg total) by mouth once daily.    insulin degludec (TRESIBA FLEXTOUCH U-200) 200 unit/mL (3 mL) InPn Inject 42 Units into the skin every evening.    insulin needles, disposable, (BD ULTRA-FINE ANA PEN NEEDLES) 32 x 5/32 " Ndle Inject twice daily    liraglutide 0.6 mg/0.1 mL, 18 mg/3 mL, subq PNIJ (VICTOZA 3-TOMAZS) 0.6 mg/0.1 mL (18 mg/3 mL) PnIj Inject 1.8 mg into the skin once daily.    metformin (GLUCOPHAGE-XR) 500 MG 24 hr tablet Take 2 tablets (1,000 mg total) by mouth 2 (two) times daily with meals.    methylPREDNISolone (MEDROL DOSEPACK) 4 mg tablet Use as directed on dose-pack -Pharmacy please specify directions for patient    omega-3 fatty acids-vitamin E (FISH OIL) 1,000 mg Cap Take 1 capsule by mouth 2 (two) times daily.    sertraline (ZOLOFT) 100 MG tablet 1 and half tablet daily (Patient taking differently: Take 150 mg by mouth once daily. )    valsartan (DIOVAN) 320 MG tablet Take 1 tablet (320 mg total) by mouth once daily.    vitamin D 1000 units Tab Take 5 tablets (5,000 Units total) by mouth " once daily.     Antibiotics     Start     Stop Route Frequency Ordered    05/26/17 2030  cefTRIAXone (ROCEPHIN) 1 g in dextrose 5 % 50 mL IVPB      -- IV Every 24 hours (non-standard times) 05/26/17 1917        Antifungals     None        Antivirals     None           Immunization History   Administered Date(s) Administered    Influenza Split 11/22/2013       Family History     Problem Relation (Age of Onset)    Cancer Father    Diabetes Maternal Aunt    Heart disease Mother    Heart failure Mother    Hypertension Mother        Social History     Social History    Marital status:      Spouse name: N/A    Number of children: N/A    Years of education: N/A     Occupational History    unemployed      Social History Main Topics    Smoking status: Never Smoker    Smokeless tobacco: Never Used    Alcohol use No    Drug use: No    Sexual activity: Not on file     Other Topics Concern    Not on file     Social History Narrative    No narrative on file     Review of Systems   Constitutional: Negative for activity change, appetite change, chills, fatigue, fever and unexpected weight change.   HENT: Positive for sore throat. Negative for trouble swallowing.    Eyes: Positive for visual disturbance. Negative for photophobia, pain and redness.   Respiratory: Negative for cough and shortness of breath.    Cardiovascular: Negative for chest pain, palpitations and leg swelling.   Gastrointestinal: Positive for blood in stool. Negative for abdominal pain, constipation, diarrhea, nausea and vomiting.   Genitourinary: Negative for dysuria and hematuria.   Musculoskeletal: Negative for arthralgias and myalgias.   Skin: Negative for rash and wound.   Neurological: Positive for headaches. Negative for weakness.   Psychiatric/Behavioral: Positive for confusion. Negative for agitation and dysphoric mood.     Objective:     Vital Signs (Most Recent):  Temp: 98.1 °F (36.7 °C) (05/29/17 1100)  Pulse: 70 (05/29/17  1100)  Resp: 13 (05/29/17 1100)  BP: (!) 154/78 (05/29/17 1100)  SpO2: 100 % (05/29/17 1100) Vital Signs (24h Range):  Temp:  [98 °F (36.7 °C)-98.8 °F (37.1 °C)] 98.1 °F (36.7 °C)  Pulse:  [63-83] 70  Resp:  [11-20] 13  SpO2:  [95 %-100 %] 100 %  BP: (109-168)/(49-92) 154/78     Weight: 118.4 kg (261 lb 0.4 oz)  Body mass index is 36.41 kg/m².    Estimated Creatinine Clearance: 130.1 mL/min (based on Cr of 0.8).    Physical Exam   Constitutional: He appears well-developed and well-nourished.   HENT:   Head: Normocephalic and atraumatic.   Eyes: Conjunctivae and EOM are normal. Pupils are equal, round, and reactive to light.   Neck: Normal range of motion. Neck supple.   Cardiovascular: Normal rate and regular rhythm.    Murmur (systolic) heard.  Pulmonary/Chest: Effort normal and breath sounds normal.   Abdominal: Soft. Bowel sounds are normal. He exhibits no distension and no mass. There is no tenderness.   Musculoskeletal: He exhibits no edema or deformity.   Neurological: He is alert. No cranial nerve deficit. Coordination normal. GCS eye subscore is 4. GCS verbal subscore is 5. GCS motor subscore is 6.   Oriented to self.  States he is in a hotel south of [place] and it is year 2016 and Steven Cardenas is president.     Skin: Skin is warm and dry. No rash noted. No erythema.   Psychiatric: He has a normal mood and affect. His behavior is normal.   Nursing note and vitals reviewed.      Significant Labs:   CBC:   Recent Labs  Lab 05/28/17  0334 05/28/17  1350 05/29/17  0108 05/29/17  1133   WBC 8.96 8.70 7.08  7.08  7.08  --    HGB 6.7* 7.4* 9.6*  9.6*  9.6* 9.1*   HCT 19.6* 21.7* 28.2*  28.2*  28.2*  --    * 133* 118*  118*  118*  --      CMP:   Recent Labs  Lab 05/28/17  0334 05/29/17  0108    140   K 3.9 3.5    111*   CO2 23 21*   GLU 96 101   BUN 13 7   CREATININE 0.8 0.8   CALCIUM 7.9* 8.3*   PROT 4.7* 5.1*   ALBUMIN 2.3* 2.5*   BILITOT 0.4 1.0   ALKPHOS 76 81   AST 38 35   ALT 47* 41    ANIONGAP 8 8   EGFRNONAA >60.0 >60.0     CSF:   Recent Labs  Lab 05/14/17  1514   CSFCULTURE No Growth       Recent Labs  Lab 05/27/17  0953   COLORU Yellow   APPEARANCEUA Clear   PHUR 5.0   SPECGRAV 1.015   PROTEINUA Negative   GLUCUA Negative   KETONESU Negative   BILIRUBINUA Negative   OCCULTUA Negative   NITRITE Negative   UROBILINOGEN 2.0   LEUKOCYTESUR Negative       Significant Imaging: I have reviewed all pertinent imaging results/findings within the past 24 hours.

## 2017-05-29 NOTE — ASSESSMENT & PLAN NOTE
-Unclear etiology at this time  -Low suspicion for infectious etiology given lack of systemic symptoms including no fevers, chills, elevated WBC count, negative CSF and blood cultures, and negative UA  -Some viral infections can cause similar presentations, though generally in the setting of severe immunosuppression.  As HIV is negative and pt has not undergone any immunosuppressive therapy, feel that TONY virus (and PML), CMV, toxo, HHV-6 are highly unlikely as etiology for encephalopathy  -IF planning to perform repeat LP, could consider testing CSF for EBV, HTLV-1, HSV, Chlamydia pneumoniae, though suspicion remains low  -Do not recommend antimicrobial therapy at this time

## 2017-05-29 NOTE — HPI
Mr. Bessy Nunez is a 56 yo male with a PMH significant for T2DM, obesity, CHASE, HLD, HTN and remote stroke who is admitted with acute encephalopathy.  He initially presented to [facility] ED s/p fall (?syncope vs seizure) on 5/23 and was transferred to [facility] for neurology consult.  He had been recently admitted to [facility] from 5/13-5/18 with visual symptoms and a questionable diagnosis of MS (though MS panel resulted as negative).  He was treated with steroids.  Upon re-admit, the pt had elevated WBC of 19 (though still on steroids), white count has since trended down.  During this admission, he has undergone EEG on 5/25 with nonfocal, nonspecific encephalopathy.  He has had multiple brain/spine MRIs and CTs that were notable for numerous foci of T2/flair signal hyperintensity concerning for demyelinating disease vs infarction.  His mental status has been waxing and waning throughout this admission.  On 5/28 the pt was transferred to neurocritical care with a GIB requiring blood transfusion.  Infectious disease has been consulted due to acute encephalopathy in setting of multiple brain lesions and unclear etiology.      Infectious work up thus far:  5/14 LP with negative CSF culture, WBC 2, , neutrophils 3, lymphs 84%, monocytes 18%, protein 93, glucose 208 (blood glucose 480), VDRL negative.  5/17 Non-reactive RPR  5/23 Lactate 3.3, blood culture no growth after 5 days  5/25 Negative HIV 1 and 2 antibodies, negative lyme, negative WNV PCR  5/26 Hepatitis panel negative  5/26 and 5/28 Blood cultures NGTD  5/27 UA negative    Per the pt's wife, she initially noticed poor coordination and balance in January/February 2017.  She states the confusion and visual symptoms began as sudden onset on 5/13 after he had worked in the yard on 5/12.  [place]  Pt has one indoor cat and there are multiple outdoor feral cats in the area.  No recent bites.  Pt has not traveled recently.  No known sick contacts.   Only new medication is pumpkin seed oil.  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  Pt is a never smoker, drinks minimal alcohol (once annually) and denies recreational drug use.  Family hx of mother with melanoma and father passed from lung cancer.

## 2017-05-29 NOTE — PROGRESS NOTES
[facility]  Vascular Neurology  Comprehensive Stroke Center  Progress Note      Assessment/Plan:     58 y/o male with possible MS treated with steroids on last admission. Patient has been having intermittent aphasia, inattention, and delayed response. Repeat MRI revealed new lesions with one lesion in R frontal lobe concerning for stroke but very degrade by motion. Patient with no real focal neuro deficits, moving all extremities, flat affect, follows commands but has inattention and delayed response. Symptoms are waxing and waning.     5/29: Repeated MRI is limited 2/2 motion artifact, revealing multiple scattered foci suggesting demyelination vs stroke. Given recent MRI there suspicion of cardio-embolic phenomenon. Plan for GIOVANNI after EGD given h/o melena and drop in H/H.        Type 2 diabetes mellitus with hyperglycemia, with long-term current use of insulin    Stroke risk factor  A1C 11.4 on 5/13/17  Continue aspart and detemir   on medication compliance prior to discharge        Essential hypertension    Stroke risk factor  Per 1ry team         Mixed hyperlipidemia    Stroke risk factor  Continue atorvastatin 40        * Encephalopathy    Patient with intermittent aphasia, inattention, and delayed response    Area in R frontal lobe concerning for possible new infarct. Patient has hyperdensity in DWI and correlating hypodensity in ADC. However this new lesion may reflect a new demyelinating lesion. Depending on stage of a demyelinating lesion, it could appear as an acute stroke on MRI. At this point, it is difficult to ascertain the cause of the R frontal lesion. Therefore, would treat as an acute stroke until proven otherwise.    Repeated MRI is limited 2/2 motion artifact, revealing multiple scattered foci suggesting demyelination vs stroke.    Recommendations:  Repeat LP  Repeat MRI spine to look for spinal lesions  Continue aspirin 81 and atorvastatin 40  SBP <220  Sq heparin for  DVT prophylaxis  PT/OT and speech to evaluate and treat  Neuro checks q2-4h            Neurologic Chief Complaint: MS vs cardio-embolic vs watershade stroke     Subjective:     Interval History: Patient is seen for follow-up neurological assessment and treatment recommendations:     HALINA. Improvement in neurological status. Still aphasic. However, communicating with family through gesture.     HPI, Past Medical, Family, and Social History remains the same as documented in the initial encounter.     Review of Systems   Unable to perform ROS: Patient nonverbal     Scheduled Meds:   atorvastatin  40 mg Oral Daily    cefTRIAXone (ROCEPHIN) IVPB  1 g Intravenous Q24H    diltiaZEM  60 mg Oral Q8H    insulin aspart  18 Units Subcutaneous TIDWM    insulin detemir  35 Units Subcutaneous QHS    levetiracetam IVPB  500 mg Intravenous Q12H    sodium chloride 0.9%  1,000 mL Intravenous Once    vitamin D  5,000 Units Oral Daily     Continuous Infusions:   sodium chloride 0.9% 75 mL/hr at 05/29/17 1400    pantoprazole 40 mg in dextrose 5 % 100 mL infusion (ready to mix system) 8 mg/hr (05/29/17 1400)     PRN Meds:sodium chloride, sodium chloride, acetaminophen, dextrose 50%, dextrose 50%, glucagon (human recombinant), glucose, glucose, insulin aspart, omnipaque    Objective:     Vital Signs (Most Recent):  Temp: 98.1 °F (36.7 °C) (05/29/17 1100)  Pulse: 64 (05/29/17 1400)  Resp: 18 (05/29/17 1400)  BP: (!) 160/83 (05/29/17 1400)  SpO2: 99 % (05/29/17 1400)  BP Location: Left arm    Vital Signs Range (Last 24H):  Temp:  [98 °F (36.7 °C)-98.8 °F (37.1 °C)]   Pulse:  [63-83]   Resp:  [11-20]   BP: (140-168)/(69-92)   SpO2:  [95 %-100 %]   BP Location: Left arm    Physical Exam   Constitutional: He appears well-developed and well-nourished.   HENT:   Head: Normocephalic and atraumatic.   Eyes: Conjunctivae and EOM are normal. Pupils are equal, round, and reactive to light.   Neck: Normal range of motion. Neck supple.    Cardiovascular: Normal rate and regular rhythm.    Murmur (systolic) heard.  Pulmonary/Chest: Effort normal and breath sounds normal.   Abdominal: Soft. Bowel sounds are normal. He exhibits no distension and no mass. There is no tenderness.   Musculoskeletal: He exhibits no edema or deformity.   Neurological: He is alert. No cranial nerve deficit. Coordination normal. GCS eye subscore is 4. GCS verbal subscore is 5. GCS motor subscore is 6.   Skin: Skin is warm and dry. No rash noted. No erythema.   Psychiatric: He has a normal mood and affect. His behavior is normal.   Nursing note and vitals reviewed.      [place] Coma Scale:  Best Eye Response: 4  Best Motor Response: 6  Best Verbal Response: 5        Laboratory:  CMP:   Recent Labs  Lab 05/29/17  0108   CALCIUM 8.3*   ALBUMIN 2.5*   PROT 5.1*      K 3.5   CO2 21*   *   BUN 7   CREATININE 0.8   ALKPHOS 81   ALT 41   AST 35   BILITOT 1.0     CBC:   Recent Labs  Lab 05/29/17  0108 05/29/17  1133   WBC 7.08  7.08  7.08  --    RBC 3.24*  3.24*  3.24*  --    HGB 9.6*  9.6*  9.6* 9.1*   HCT 28.2*  28.2*  28.2*  --    *  118*  118*  --    MCV 87  87  87  --    MCH 29.6  29.6  29.6  --    MCHC 34.0  34.0  34.0  --      Lipid Panel: No results for input(s): CHOL, LDLCALC, HDL, TRIG in the last 168 hours.  Hgb A1C: No results for input(s): HGBA1C in the last 168 hours.  TSH:   Recent Labs  Lab 05/24/17  0626   TSH 1.786           Salima Brown MD  [facility]  Department of Vascular Neurology   [facility]

## 2017-05-29 NOTE — PT/OT/SLP PROGRESS
Speech Language Pathology Note    Pt DC'ed from SLP services 2/2 transferred to ICU. Please reconsult SLP when appropriate. Thank you.    Marva Madrid M.A. CCC-SLP  Speech Language Pathologist  [phone]  5/29/2017

## 2017-05-29 NOTE — PATIENT INSTRUCTIONS
Discharge Summary/Instructions for after EGD without Biopsy  Patient Name: Bessy Nunez  Patient MRN: [id number]  Patient YOB: 1960  Monday, May 29, 2017  Hai Carter MD  1.  Do Not eat or drink anything for 1 hour.  Try sips of water first.  If   tolerated, resume your regular diet or one recommended by your physician.  2.  Do not drive, operate machinery, make critical decisions, or do   activities that require coordination or balance for 24 hours.  3.  You may experience a sore throat for 24 to 48 hours.  You may use throat   lozenges or gargle with warm salt water to relieve the discomfort.  4.  Because air was put into your stomach during the procedure, you may   experience some belching.  5.  Go directly to the emergency room if you notice any of the following:   Chills and/or fever over 101   Persistent vomiting or vomiting with blood   Severe abdominal pain, other than gas cramps   Severe chest pain   Black, tarry stools  Your doctor recommends these additional instructions:  If any biopsies were performed, my office will call you in 5 to 6 business   days with any results.  Advance your diet as tolerated.   Continue your present medications.   Your physician has recommended a repeat upper endoscopy in two months to   check healing.   Your physician has recommended an H. pylori serology at appointment to be   scheduled.  If you have any questions or problems, please call your physician.  EMERGENCY PHONE NUMBER: [phone]  LAB RESULTS: [phone]  Hai Carter MD  5/29/2017 4:17:32 PM  This report has been verified and signed electronically.

## 2017-05-29 NOTE — PROGRESS NOTES
Pt is undergoing EGD today for possible GI bleed.  Will perform GIOVANNI after GI evaluation and once Hg has stabilized.  Will tentatively place on schedule for tomorrow.  Please keep NPO after MN tonight.    Michelle Daley MD, MPH  Cardiology Fellow

## 2017-05-29 NOTE — ANESTHESIA POSTPROCEDURE EVALUATION
"Anesthesia Post Evaluation    Patient: Bessy Nunez    Procedure(s) Performed: Procedure(s) (LRB):  ESOPHAGOGASTRODUODENOSCOPY (EGD) (N/A)    Final Anesthesia Type: general  Patient location during evaluation: ICU  Patient participation: Yes- Able to Participate  Level of consciousness: awake and alert  Post-procedure vital signs: reviewed and stable  Pain management: adequate  Airway patency: patent  PONV status at discharge: No PONV  Anesthetic complications: no      Cardiovascular status: blood pressure returned to baseline  Respiratory status: unassisted, spontaneous ventilation and room air  Hydration status: euvolemic  Follow-up not needed.        Visit Vitals  /71   Pulse 75   Temp 36.7 °C (98 °F) (Oral)   Resp 14   Ht 5' 11" (1.803 m)   Wt 118.4 kg (261 lb 0.4 oz)   SpO2 100%   BMI 36.41 kg/m²       Pain/Poonam Score: Pain Assessment Performed: Yes (5/29/2017  3:00 PM)  Presence of Pain: denies (5/29/2017  3:00 PM)  Pain Rating Prior to Med Admin: 2 (5/28/2017 12:43 PM)  Pain Rating Post Med Admin: 0 (5/28/2017  5:25 AM)      "

## 2017-05-29 NOTE — PLAN OF CARE
Problem: Patient Care Overview  Goal: Plan of Care Review  Outcome: Ongoing (interventions implemented as appropriate)  POC reviewed with pt and family at 1400. Pt verbalized understanding. Questions and concerns addressing confusion answered. No acute events today. Pt progressing toward goals. Will continue to monitor. See flowsheets for full assessment and VS info. Awaiting EGD, pt still NPO. Pt is more alert today than yesterday.

## 2017-05-29 NOTE — ASSESSMENT & PLAN NOTE
Patient with intermittent aphasia, inattention, and delayed response    Area in R frontal lobe concerning for possible new infarct. Patient has hyperdensity in DWI and correlating hypodensity in ADC. However this new lesion may reflect a new demyelinating lesion. Depending on stage of a demyelinating lesion, it could appear as an acute stroke on MRI. At this point, it is difficult to ascertain the cause of the R frontal lesion. Therefore, would treat as an acute stroke until proven otherwise.    Repeated MRI is limited 2/2 motion artifact, revealing multiple scattered foci suggesting demyelination vs stroke.    Recommendations:  Repeat LP  Repeat MRI spine to look for spinal lesions  Continue aspirin 81 and atorvastatin 40  SBP <220  Sq heparin for DVT prophylaxis  PT/OT and speech to evaluate and treat  Neuro checks q2-4h

## 2017-05-29 NOTE — CONSULTS
[facility]  Infectious Disease  Consult Note    Patient Name: Bessy Nunez  MRN: [id number]  Admission Date: 5/23/2017  Hospital Length of Stay: 6 days  Attending Physician: Elvira Hendricks MD  Primary Care Provider: Edgar Nova MD     Isolation Status: No active isolations    Patient information was obtained from spouse/SO, relative(s), past medical records and ER records.      Inpatient consult to Infectious Diseases  Consult performed by: DEEPIKA BAINS  Consult ordered by: TIGRE MCKINNEY  Reason for consult: Acute encephalopahty in setting of brain lesions        Assessment/Plan:     * Encephalopathy    -Unclear etiology at this time  -Low suspicion for infectious etiology given lack of systemic symptoms including no fevers, chills, elevated WBC count, negative CSF and blood cultures, and negative UA  -Some viral infections can cause similar presentations, though generally in the setting of severe immunosuppression.  As HIV is negative and pt has not undergone any immunosuppressive therapy, feel that TONY virus (and PML), CMV, toxo, HHV-6 are highly unlikely as etiology for encephalopathy  -IF planning to perform repeat LP, could consider testing CSF for EBV, HTLV-1, HSV, Chlamydia pneumoniae, though suspicion remains low  -Do not recommend antimicrobial therapy at this time          Deepika Bains MD  Infectious Disease  [facility]    Subjective:     Principal Problem: Encephalopathy    HPI: Mr. Bessy Nunez is a 56 yo male with a PMH significant for T2DM, obesity, CHASE, HLD, HTN and remote stroke who is admitted with acute encephalopathy.  He initially presented to [facility] ED s/p fall (?syncope vs seizure) on 5/23 and was transferred to [facility] for neurology consult.  He had been recently admitted to [facility] from 5/13-5/18 with visual symptoms and a questionable diagnosis of MS (though MS panel resulted as negative).  He was treated with steroids.  Upon re-admit,  the pt had elevated WBC of 19 (though still on steroids), white count has since trended down.  During this admission, he has undergone EEG on 5/25 with nonfocal, nonspecific encephalopathy.  He has had multiple brain/spine MRIs and CTs that were notable for numerous foci of T2/flair signal hyperintensity concerning for demyelinating disease vs infarction.  His mental status has been waxing and waning throughout this admission.  On 5/28 the pt was transferred to neurocritical care with a GIB requiring blood transfusion.  Infectious disease has been consulted due to acute encephalopathy in setting of multiple brain lesions and unclear etiology.      Infectious work up thus far:  5/14 LP with negative CSF culture, WBC 2, , neutrophils 3, lymphs 84%, monocytes 18%, protein 93, glucose 208 (blood glucose 480), VDRL negative.  5/17 Non-reactive RPR  5/23 Lactate 3.3, blood culture no growth after 5 days  5/25 Negative HIV 1 and 2 antibodies, negative lyme, negative WNV PCR  5/26 Hepatitis panel negative  5/26 and 5/28 Blood cultures NGTD  5/27 UA negative    Per the pt's wife, she initially noticed poor coordination and balance in January/February 2017.  She states the confusion and visual symptoms began as sudden onset on 5/13 after he had worked in the yard on 5/12.  [place]  Pt has one indoor cat and there are multiple outdoor feral cats in the area.  No recent bites.  Pt has not traveled recently.  No known sick contacts.  Only new medication is pumpkin seed oil.  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  Pt is a never smoker, drinks minimal alcohol (once annually) and denies recreational drug use.  Family hx of mother with melanoma and father passed from lung cancer.      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    atenolol (TENORMIN) 50 MG tablet Take 1 tablet (50 mg total) by mouth once daily.    atorvastatin (LIPITOR) 40 MG tablet Take 1 tablet (40 mg total) by mouth once daily.    diltiaZEM (DILACOR XR) 240 MG CDCR Take 1 capsule (240 mg total) by mouth once daily.    insulin degludec (TRESIBA FLEXTOUCH U-200) 200 unit/mL (3 mL) InPn Inject 42 Units into the skin every evening.    insulin needles, disposable, (BD ULTRA-FINE ANA PEN NEEDLES) 32 x 5/32 " Ndle Inject twice daily    liraglutide 0.6 mg/0.1 mL, 18 mg/3 mL, subq PNIJ (VICTOZA 3-TOMASZ) 0.6 mg/0.1 mL (18 mg/3 mL) PnIj Inject 1.8 mg into the skin once daily.    metformin (GLUCOPHAGE-XR) 500 MG 24 hr tablet Take 2 tablets (1,000 mg total) by mouth 2 (two) times daily with meals.    methylPREDNISolone (MEDROL DOSEPACK) 4 mg tablet Use as directed on dose-pack -Pharmacy please specify directions for patient    omega-3 fatty acids-vitamin E (FISH OIL) 1,000 mg Cap Take 1 capsule by mouth 2 (two) times daily.    sertraline (ZOLOFT) 100 MG tablet 1 and half tablet daily (Patient taking differently: Take 150 mg by mouth once daily. )    valsartan (DIOVAN) 320 MG tablet Take 1 tablet (320 mg total) by mouth once daily.    vitamin D 1000 units Tab Take 5 tablets (5,000 Units total) by mouth once daily.     Antibiotics     Start     Stop Route Frequency Ordered    " 05/26/17 2030  cefTRIAXone (ROCEPHIN) 1 g in dextrose 5 % 50 mL IVPB      -- IV Every 24 hours (non-standard times) 05/26/17 1917        Antifungals     None        Antivirals     None           Immunization History   Administered Date(s) Administered    Influenza Split 11/22/2013       Family History     Problem Relation (Age of Onset)    Cancer Father    Diabetes Maternal Aunt    Heart disease Mother    Heart failure Mother    Hypertension Mother        Social History     Social History    Marital status:      Spouse name: N/A    Number of children: N/A    Years of education: N/A     Occupational History    unemployed      Social History Main Topics    Smoking status: Never Smoker    Smokeless tobacco: Never Used    Alcohol use No    Drug use: No    Sexual activity: Not on file     Other Topics Concern    Not on file     Social History Narrative    No narrative on file     Review of Systems   Constitutional: Negative for activity change, appetite change, chills, fatigue, fever and unexpected weight change.   HENT: Positive for sore throat. Negative for trouble swallowing.    Eyes: Positive for visual disturbance. Negative for photophobia, pain and redness.   Respiratory: Negative for cough and shortness of breath.    Cardiovascular: Negative for chest pain, palpitations and leg swelling.   Gastrointestinal: Positive for blood in stool. Negative for abdominal pain, constipation, diarrhea, nausea and vomiting.   Genitourinary: Negative for dysuria and hematuria.   Musculoskeletal: Negative for arthralgias and myalgias.   Skin: Negative for rash and wound.   Neurological: Positive for headaches. Negative for weakness.   Psychiatric/Behavioral: Positive for confusion. Negative for agitation and dysphoric mood.     Objective:     Vital Signs (Most Recent):  Temp: 98.1 °F (36.7 °C) (05/29/17 1100)  Pulse: 70 (05/29/17 1100)  Resp: 13 (05/29/17 1100)  BP: (!) 154/78 (05/29/17 1100)  SpO2: 100 %  (05/29/17 1100) Vital Signs (24h Range):  Temp:  [98 °F (36.7 °C)-98.8 °F (37.1 °C)] 98.1 °F (36.7 °C)  Pulse:  [63-83] 70  Resp:  [11-20] 13  SpO2:  [95 %-100 %] 100 %  BP: (109-168)/(49-92) 154/78     Weight: 118.4 kg (261 lb 0.4 oz)  Body mass index is 36.41 kg/m².    Estimated Creatinine Clearance: 130.1 mL/min (based on Cr of 0.8).    Physical Exam   Constitutional: He appears well-developed and well-nourished.   HENT:   Head: Normocephalic and atraumatic.   Eyes: Conjunctivae and EOM are normal. Pupils are equal, round, and reactive to light.   Neck: Normal range of motion. Neck supple.   Cardiovascular: Normal rate and regular rhythm.    Murmur (systolic) heard.  Pulmonary/Chest: Effort normal and breath sounds normal.   Abdominal: Soft. Bowel sounds are normal. He exhibits no distension and no mass. There is no tenderness.   Musculoskeletal: He exhibits no edema or deformity.   Neurological: He is alert. No cranial nerve deficit. Coordination normal. GCS eye subscore is 4. GCS verbal subscore is 5. GCS motor subscore is 6.   Oriented to self.  States he is in a hotel [place] [facility] and it is year 2016 and Steven Cardenas is president.     Skin: Skin is warm and dry. No rash noted. No erythema.   Psychiatric: He has a normal mood and affect. His behavior is normal.   Nursing note and vitals reviewed.      Significant Labs:   CBC:   Recent Labs  Lab 05/28/17  0334 05/28/17  1350 05/29/17  0108 05/29/17  1133   WBC 8.96 8.70 7.08  7.08  7.08  --    HGB 6.7* 7.4* 9.6*  9.6*  9.6* 9.1*   HCT 19.6* 21.7* 28.2*  28.2*  28.2*  --    * 133* 118*  118*  118*  --      CMP:   Recent Labs  Lab 05/28/17  0334 05/29/17  0108    140   K 3.9 3.5    111*   CO2 23 21*   GLU 96 101   BUN 13 7   CREATININE 0.8 0.8   CALCIUM 7.9* 8.3*   PROT 4.7* 5.1*   ALBUMIN 2.3* 2.5*   BILITOT 0.4 1.0   ALKPHOS 76 81   AST 38 35   ALT 47* 41   ANIONGAP 8 8   EGFRNONAA >60.0 >60.0     CSF:   Recent Labs  Lab  05/14/17  1514   CSFCULTURE No Growth       Recent Labs  Lab 05/27/17  0953   COLORU Yellow   APPEARANCEUA Clear   PHUR 5.0   SPECGRAV 1.015   PROTEINUA Negative   GLUCUA Negative   KETONESU Negative   BILIRUBINUA Negative   OCCULTUA Negative   NITRITE Negative   UROBILINOGEN 2.0   LEUKOCYTESUR Negative       Significant Imaging: I have reviewed all pertinent imaging results/findings within the past 24 hours.

## 2017-05-29 NOTE — TRANSFER OF CARE
"Anesthesia Transfer of Care Note    Patient: Bessy Nunez    Procedure(s) Performed: Procedure(s) (LRB):  ESOPHAGOGASTRODUODENOSCOPY (EGD) (N/A)    Patient location: ICU    Anesthesia Type: general    Transport from OR: Transported from OR on 2-3 L/min O2 by NC with adequate spontaneous ventilation    Post pain: adequate analgesia    Post assessment: no apparent anesthetic complications    Post vital signs: stable    Level of consciousness: sedated    Nausea/Vomiting: no nausea/vomiting    Complications: none    Transfer of care protocol was followedComments: HR 72, NIBP 118/57, 100% O2 Sat on 4L/min NC      Last vitals:   Visit Vitals  BP (!) 149/67   Pulse 63   Temp 36.7 °C (98 °F) (Oral)   Resp 14   Ht 5' 11" (1.803 m)   Wt 118.4 kg (261 lb 0.4 oz)   SpO2 97%   BMI 36.41 kg/m²     "

## 2017-05-29 NOTE — TREATMENT PLAN
GI Follow-up Note    EGD done.  Large duodenal ulcer with visible vessel, s/p bipolar cautery.  Multiple small clean based gastric ulcers. 1 small gastric ulcer with tiny vessel, also treated with bipolar cautery.  No active bleeding seen.      Plan:  PPI IV BID x 72 hrs then transition to PPI BID PO for 8 weeks  Repeat EGD in 8 weeks to assess healing  Check H pylori serology and treat if positive.    Will sign off at this time.  Please call with any additional concerns /questions    Braydon Stewart MD  Gastroenterology Fellow (PGY-IV)  Pager: [phone]

## 2017-05-29 NOTE — PT/OT/SLP DISCHARGE
Physical Therapy Discharge Summary    Bessy Nunez  MRN: [id number]   Encephalopathy   Patient Discharged from acute Physical Therapy on 2017.  Please refer to prior PT noted date on 2017 for functional status.     Assessment:   Patient appropriate for care in another setting.  GOALS:    Physical Therapy Goals        Problem: Physical Therapy Goal    Goal Priority Disciplines Outcome Goal Variances Interventions   Physical Therapy Goal     PT/OT, PT Ongoing (interventions implemented as appropriate)     Description:  Goals to be met by: 17     Patient will increase functional independence with mobility by performin. Pt to perf [place] 10x sit<>stand test: 38 sec, with S to perf, to demonstrate improvements in B LE mm strength.  2. Bed to chair transfer with Supervision.  3. Pt to perf 6MWT: amb 616', with CGA PRN, to demonstrate a clinically significant improvement in aerobic capacity.    4. Lower extremity exercise program x 20 reps per handout, with assistance as needed.                     Reasons for Discontinuation of Therapy Services  Transfer to alternate level of care., Patient is unable to continue work toward goals because of medical or psychosocial complications. and Discharged to Neuro ICU      Plan:  Patient Discharged to: Neuro ICU     Alexa Smith, PT, DPT  2017  .

## 2017-05-29 NOTE — ASSESSMENT & PLAN NOTE
Stroke risk factor  A1C 11.4 on 5/13/17  Continue aspart and detemir   on medication compliance prior to discharge

## 2017-05-29 NOTE — ASSESSMENT & PLAN NOTE
Patient demonstrates waxing and waning of mental status throughout the day. MRI findings concerning for ischemic event vs active demyelination, with ADC correlation of DWI abnormality.  Most recent MRI does not appear to demonstrate new lesions. Currently,  lesions suggestive of infarct in the R frontal and temporal lobes as well as the left cerebellar hemisphere and left brainstem, correlating with multiple vascular territories. Will need to rule out toxic metabolic etiologies. Cultures thus far have been negative. Repeat UA negative.  Vascular Neurology is following. GIOVANNI planned after EGD.     F/u  anti-Phospholipid ab/cardiolipin ab  Decrease keppra to 500mg bid  GIOVANNI, as above  F/u vascular neurology recs.  Consider repeat MRI Brain today/tomorrow    F/u lyme titer (neg), WNV (neg), SSA (neg) , SSB (neg), dsDNA, vasculitis labs, ESR 15, CRP 5.37, HIV neg, REYMUNDO, as well as an autoimmune encephalopathy panel    24h EEG 5/26 negative for simple partial seizures / underlying etiology of waxing and waning MS

## 2017-05-29 NOTE — SUBJECTIVE & OBJECTIVE
Subjective:     Interval History: No new events overnight    Current Neurological Medications:     Current Facility-Administered Medications   Medication Dose Route Frequency Provider Last Rate Last Dose    0.9%  NaCl infusion (for blood administration)   Intravenous Q24H PRN Guanaco Story MD        0.9%  NaCl infusion (for blood administration)   Intravenous Q24H PRN Sandra Reyes PA-C        0.9%  NaCl infusion   Intravenous Continuous Elvira Hendricks MD 75 mL/hr at 05/29/17 0900      acetaminophen tablet 650 mg  650 mg Oral Q6H PRN Gilberto Díaz MD   650 mg at 05/28/17 1243    atorvastatin tablet 40 mg  40 mg Oral Daily Donna Jason MD   40 mg at 05/28/17 1243    cefTRIAXone (ROCEPHIN) 1 g in dextrose 5 % 50 mL IVPB  1 g Intravenous Q24H Josefina Urena MD   1 g at 05/28/17 1945    dextrose 50% injection 12.5 g  12.5 g Intravenous PRN Donna Jason MD   12.5 g at 05/28/17 0936    dextrose 50% injection 25 g  25 g Intravenous PRN Donna Jason MD        diltiaZEM tablet 60 mg  60 mg Oral Q8H Guanaco Story MD   60 mg at 05/29/17 0635    glucagon (human recombinant) injection 1 mg  1 mg Intramuscular PRN Donna Jason MD        glucose chewable tablet 16 g  16 g Oral PRN Donna Jason MD        glucose chewable tablet 24 g  24 g Oral PRN Donna Jason MD        insulin aspart pen 0-5 Units  0-5 Units Subcutaneous QID (AC + HS) PRN Donna Jason MD   2 Units at 05/26/17 0839    insulin aspart pen 18 Units  18 Units Subcutaneous TIDWM Josefina Urena MD   18 Units at 05/27/17 1817    insulin detemir pen 35 Units  35 Units Subcutaneous QHS Guanaco Story MD   35 Units at 05/28/17 2023    levetiracetam in NaCl (iso-os) IVPB 500 mg  500 mg Intravenous Q12H Guanaco Story  mL/hr at 05/29/17 0831 500 mg at 05/29/17 0831    omnipaque oral solution 15 mL   15 mL Oral PRN Horace Hernandez MD        pantoprazole 40 mg in dextrose 5 % 100 mL infusion (ready to mix system)  8 mg/hr Intravenous Continuous Elvira Hendricks MD 20 mL/hr at 05/29/17 0900 8 mg/hr at 05/29/17 0900    sodium chloride 0.9% bolus 1,000 mL  1,000 mL Intravenous Once Guanaco Story MD        vitamin D 1000 units tablet 5,000 Units  5,000 Units Oral Daily Guanaco Story MD   5,000 Units at 05/28/17 1244       Review of Systems  Objective:     Vital Signs (Most Recent):  Temp: 98.6 °F (37 °C) (05/29/17 0705)  Pulse: 67 (05/29/17 0900)  Resp: 14 (05/29/17 0900)  BP: (!) 154/79 (05/29/17 0900)  SpO2: 99 % (05/29/17 0900) Vital Signs (24h Range):  Temp:  [97.6 °F (36.4 °C)-98.8 °F (37.1 °C)] 98.6 °F (37 °C)  Pulse:  [63-83] 67  Resp:  [12-20] 14  SpO2:  [95 %-100 %] 99 %  BP: (109-168)/(49-92) 154/79     Weight: 118.4 kg (261 lb 0.4 oz)  Body mass index is 36.41 kg/m².    Physical Exam     He's awake and alert. Will answer some questions, but not others. Moving all extremities.

## 2017-05-29 NOTE — PT/OT/SLP DISCHARGE
Occupational Therapy Discharge Summary    Bessy Nunez  MRN: [id number]   Encephalopathy   Patient Discharged from acute Occupational Therapy on 5/29/2017.  Please refer to prior OT note dated on 5/26/2017 for functional status.     Assessment:   Patient has not met goals.     GOALS:    Occupational Therapy Goals        Problem: Occupational Therapy Goal    Goal Priority Disciplines Outcome Interventions   Occupational Therapy Goal     OT, PT/OT Ongoing (interventions implemented as appropriate)    Description:  Goals to be met by: 06/04/17     Patient will increase functional independence with ADLs by performing:    Feeding with Modified [place].  UE Dressing with Minimum [place].  LE Dressing with Moderate Assistance.  Grooming while seated with Set-up Assistance.  Toileting from bedside commode with Moderate Assistance for hygiene and clothing management.   Supine to sit with Minimal Assistance.  Toilet transfer to bedside commode with Moderate Assistance.  Upper extremity exercise program x12 reps per handout, with supervision.                    Reasons for Discontinuation of Therapy Services  Change in medial status; pt transer to ICU      Plan:  Patient Discharged to: Pt remains in NCC unit.  Current OT orders discontinued.  Will re-evaluate and continue treatment with new orders once pt medically stable and appropriate to be seen.    COLTON Marino  5/29/2017

## 2017-05-29 NOTE — INTERVAL H&P NOTE
The patient has been examined and the H&P has been reviewed:    I concur with the findings and no changes have occurred since H&P was written.    Anesthesia/Surgery risks, benefits and alternative options discussed and understood by patient/family.          Active Hospital Problems    Diagnosis  POA    *Encephalopathy [G93.40]  Yes    Acute blood loss anemia [D62]  Unknown    Upper GI bleed [K92.2]  Yes    Acute encephalopathy [G93.40]  Unknown    Leukocytosis [D72.829]  Yes    Diplopia [H53.2]  Yes    Multiple sclerosis [G35]  Yes    Fall [W19.XXXA]  Unknown    Syncope [R55]  Yes    Type 2 diabetes mellitus with diabetic polyneuropathy, with long-term current use of insulin [E11.42, Z79.4]  Not Applicable    Ataxia [R27.0]  Yes    Demyelinating changes in brain [G37.9]  Yes     By mri.  Several active lesions, many old.  5/13: MRA brain/neck, MRI brain w/wo contrast show no vascular occlusion, multiple foci of hyperintensity in deep cerebral periventricular white matter in pattern of demyelinating process.   5/17: MRI spine performed, no spinal cord lesions.     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  5/17: MRI spine performed, no spinal cord lesions.      NAFLD (nonalcoholic fatty liver disease) [K76.0]  Yes     Chronic    Type 2 diabetes mellitus with hyperglycemia, with long-term current use of insulin [E11.65, Z79.4]  Not Applicable    Obesity [E66.9]  Yes     Chronic    Sleep apnea [G47.30]  Yes     Chronic    Depressive disorder, not elsewhere classified [F32.9]  Yes     Chronic    Essential hypertension [I10]  Yes     Chronic    Mixed hyperlipidemia [E78.2]  Yes     Chronic      Resolved Hospital Problems    Diagnosis Date Resolved POA   No resolved problems to display.

## 2017-05-29 NOTE — H&P (VIEW-ONLY)
[facility]  Gastroenterology  Consult Note    Patient Name: Bessy Nunez  MRN: [id number]  Admission Date: 5/23/2017  Hospital Length of Stay: 5 days  Code Status: Full Code   Attending Provider: Elvira Hendricks MD   Consulting Provider: Karma Pearson MD  Primary Care Physician: Edgar Nova MD  Principal Problem:Encephalopathy    Inpatient consult to Gastroenterology  Consult performed by: KARMA PEARSON  Consult ordered by: TIGRE MCKINNEY  Reason for consult: GIB        Subjective:     HPI:    Bessy Nunez is a 57 y.o. male with poorly controlled Type 2 diabetes on insulin, HTN, mixed HLP and recently diagnosed with multiple sclerosis admitted on 5/23/2017 with double vision, confusion and dizziness. Seen by neurology and vascular medicine and this was thought to be due to demyelinating disorder of unclear etiology, possible ischemic strokes. GI consulted for dark stools for last 2 days and anemia. His Hgb on admission was 11 slowly trended to 6.7 with no overt GIB. Remains HD stable. On aspirin only. PPIs started today. No prior EGD but colonoscopy 2014 with small polyp. CT abd/pelvis from yesterday unremarkable. He is on aspirin.     Review of Systems:  Constitutional: No fever, chills.   Eyes: +visual changes, + diplopia, no eye discharge  ENT: no sore throat or runny nose.  Respiratory: no cough or shortness of breath    Cardiovascular: no chest pain or palpitations    Gastrointestinal: as per HPI  Hematologic/Lymphatic: No petechia, no lymphadenopathy  Musculoskeletal: no arthralgias or myalgias    Neurological: no seizures, tremors   Behavioral/Psych: No suicidal ideation, no hallucination  Skin: No rash, no raynaud's    Past Medical History: has a past medical history of Essential hypertension; Internuclear ophthalmoplegia of left eye; Mixed hyperlipidemia; NAFLD (nonalcoholic fatty liver disease); CHASE (nonalcoholic steatohepatitis); Obesity; Stroke; and Type  "2 diabetes mellitus with diabetic polyneuropathy, with long-term current use of insulin.    Past Surgical History: has a past surgical history that includes Skin cancer excision.    Family History:family history includes Cancer in his father; Diabetes in his maternal aunt; Heart disease in his mother; Heart failure in his mother; Hypertension in his mother.    Allergies: Reviewed in EPIC.     Social History: reports that he has never smoked. He has never used smokeless tobacco. He reports that he does not drink alcohol or use drugs.    Home medications:   No current facility-administered medications on file prior to encounter.      Current Outpatient Prescriptions on File Prior to Encounter   Medication Sig Dispense Refill    aspirin (ECOTRIN) 81 MG EC tablet Take 81 mg by mouth once daily.      atenolol (TENORMIN) 50 MG tablet Take 1 tablet (50 mg total) by mouth once daily. 90 tablet 3    atorvastatin (LIPITOR) 40 MG tablet Take 1 tablet (40 mg total) by mouth once daily. 90 tablet 3    diltiaZEM (DILACOR XR) 240 MG CDCR Take 1 capsule (240 mg total) by mouth once daily. 90 capsule 3    insulin degludec (TRESIBA FLEXTOUCH U-200) 200 unit/mL (3 mL) InPn Inject 42 Units into the skin every evening. 3 Syringe 1    insulin needles, disposable, (BD ULTRA-FINE ANA PEN NEEDLES) 32 x 5/32 " Ndle Inject twice daily 100 each 3    liraglutide 0.6 mg/0.1 mL, 18 mg/3 mL, subq PNIJ (VICTOZA 3-TOMASZ) 0.6 mg/0.1 mL (18 mg/3 mL) PnIj Inject 1.8 mg into the skin once daily. 9 mL 11    metformin (GLUCOPHAGE-XR) 500 MG 24 hr tablet Take 2 tablets (1,000 mg total) by mouth 2 (two) times daily with meals. 360 tablet 0    methylPREDNISolone (MEDROL DOSEPACK) 4 mg tablet Use as directed on dose-pack -Pharmacy please specify directions for patient 1 Package 0    omega-3 fatty acids-vitamin E (FISH OIL) 1,000 mg Cap Take 1 capsule by mouth 2 (two) times daily.  0    sertraline (ZOLOFT) 100 MG tablet 1 and half tablet daily " (Patient taking differently: Take 150 mg by mouth once daily. ) 135 tablet 3    valsartan (DIOVAN) 320 MG tablet Take 1 tablet (320 mg total) by mouth once daily. 90 tablet 3    vitamin D 1000 units Tab Take 5 tablets (5,000 Units total) by mouth once daily.         Scheduled Meds:    aspirin  81 mg Oral Daily    atorvastatin  40 mg Oral Daily    cefTRIAXone (ROCEPHIN) IVPB  1 g Intravenous Q24H    diltiaZEM  240 mg Oral Daily    insulin aspart  18 Units Subcutaneous TIDWM    insulin detemir  40 Units Subcutaneous QHS    levetiracetam IVPB  1,000 mg Intravenous Q12H    magnesium sulfate IVPB  2 g Intravenous Once    pantoprazole  40 mg Intravenous BID    sodium chloride 0.9%  1,000 mL Intravenous Once    vitamin D  5,000 Units Oral Daily       Continuous Infusions:      PRN Meds: sodium chloride, acetaminophen, dextrose 50%, dextrose 50%, glucagon (human recombinant), glucose, glucose, insulin aspart, omnipaque    Vital signs:  Temp:  [97.6 °F (36.4 °C)-98.5 °F (36.9 °C)]   Pulse:  [67-97]   Resp:  [17-20]   BP: ()/(48-72)   SpO2:  [95 %-100 %]     Physical exam:  General: No acute distress, obese  HEENT: Head: Normocephalic, atraumatic.   Eyes: Sclera non-icteric. EOMI.   Neck: Supple  Heart: Regular rate and rhythm, no gallops  Lungs: Non labored breathing, no wheezing  Abdomen: Bowel sounds normal, no organomegaly, masses, or hernia. No tenderness, no rebound or guarding. No distention.   Rectal: Deferred  Extremities: No deformities, no C/C/E  Neurologic: Able to follow commands and move 4 extremities. AOX1  Skin: No rash    Routine labs:    Recent Labs  Lab 05/28/17  0334   WBC 8.96   HGB 6.7*   HCT 19.6*   *   MCV 88       Recent Labs  Lab 05/28/17  0334      K 3.9   CO2 23   BUN 13   CREATININE 0.8       Recent Labs  Lab 05/28/17  0334   ALBUMIN 2.3*   ALKPHOS 76   ALT 47*   AST 38   BILITOT 0.4     Imaging and prior endoscopies  As above    Assessment/Plan:     Bessy Nunez  is a 57 y.o. male with dark stools. DDx PUD, gastritis, esophagitis.     Acute blood loss anemia    .          Plan:  Protonix 80mg IV bolus x 1 then gtt at 8mg/hr  Intravascular resuscitation/support with IVFs   Serial H/H's and pRBCs transfusion as indicated  Discontinue all NSAIDs and Heparin products  Please correct any coagulopathy with platelets and FFP to a goal of platelets >50K and INR <2.0  Maintain IV access with 2 large bore IVs  NPO  Plan for EGD tomorrow or emergently if there is hemodynamic compromise in the setting of overt GI bleeding  Please notify GI team if there is significant change in patient's clinical status      Adarsh Welsh MD  Gastroenterology  [facility]

## 2017-05-29 NOTE — ANESTHESIA PREPROCEDURE EVALUATION
05/29/2017  Pre-operative evaluation for Procedure(s) (LRB):  ESOPHAGOGASTRODUODENOSCOPY (EGD) (N/A)    Bessy Nunez is a 57 y.o. male hx of DM, JOHN, obesity,  HTN, MS (recently receiving solumedrol), presenting with double vision and dizziness.  On 5/28, patient demonstrates waxing and waning of mental status throughout the day. MRI findings concerning for ischemic event vs active demyelination, with ADC correlation of DWI abnormality. S/p 2U PRBC for upper GIB.      20g x2 and 22g    On protonix drip    Patient Active Problem List   Diagnosis    Depressive disorder, not elsewhere classified    Mixed hyperlipidemia    Essential hypertension    Lipodystrophy    Type 2 diabetes mellitus with hyperglycemia, with long-term current use of insulin    NAFLD (nonalcoholic fatty liver disease)    Obesity    ED (erectile dysfunction)    Sleep apnea    Hx of colonic polyp    Left facial numbness    Internuclear ophthalmoplegia of left eye    Amblyopia    Demyelinating changes in brain    Ataxia    Type 2 diabetes mellitus with diabetic polyneuropathy, with long-term current use of insulin    Syncope    Leukocytosis    Diplopia    Multiple sclerosis    Encephalopathy    Fall    Acute encephalopathy    Acute blood loss anemia    Upper GI bleed       Review of patient's allergies indicates:  No Known Allergies    No current facility-administered medications on file prior to encounter.      Current Outpatient Prescriptions on File Prior to Encounter   Medication Sig Dispense Refill    aspirin (ECOTRIN) 81 MG EC tablet Take 81 mg by mouth once daily.      atenolol (TENORMIN) 50 MG tablet Take 1 tablet (50 mg total) by mouth once daily. 90 tablet 3    atorvastatin (LIPITOR) 40 MG tablet Take 1 tablet (40 mg total) by mouth once daily. 90 tablet 3    diltiaZEM (DILACOR XR) 240 MG CDCR Take 1  "capsule (240 mg total) by mouth once daily. 90 capsule 3    insulin degludec (TRESIBA FLEXTOUCH U-200) 200 unit/mL (3 mL) InPn Inject 42 Units into the skin every evening. 3 Syringe 1    insulin needles, disposable, (BD ULTRA-FINE ANA PEN NEEDLES) 32 x 5/32 " Ndle Inject twice daily 100 each 3    liraglutide 0.6 mg/0.1 mL, 18 mg/3 mL, subq PNIJ (VICTOZA 3-TOMASZ) 0.6 mg/0.1 mL (18 mg/3 mL) PnIj Inject 1.8 mg into the skin once daily. 9 mL 11    metformin (GLUCOPHAGE-XR) 500 MG 24 hr tablet Take 2 tablets (1,000 mg total) by mouth 2 (two) times daily with meals. 360 tablet 0    methylPREDNISolone (MEDROL DOSEPACK) 4 mg tablet Use as directed on dose-pack -Pharmacy please specify directions for patient 1 Package 0    omega-3 fatty acids-vitamin E (FISH OIL) 1,000 mg Cap Take 1 capsule by mouth 2 (two) times daily.  0    sertraline (ZOLOFT) 100 MG tablet 1 and half tablet daily (Patient taking differently: Take 150 mg by mouth once daily. ) 135 tablet 3    valsartan (DIOVAN) 320 MG tablet Take 1 tablet (320 mg total) by mouth once daily. 90 tablet 3    vitamin D 1000 units Tab Take 5 tablets (5,000 Units total) by mouth once daily.         Past Surgical History:   Procedure Laterality Date    SKIN CANCER EXCISION         Social History     Social History    Marital status:      Spouse name: N/A    Number of children: N/A    Years of education: N/A     Occupational History    unemployed      Social History Main Topics    Smoking status: Never Smoker    Smokeless tobacco: Never Used    Alcohol use No    Drug use: No    Sexual activity: Not on file     Other Topics Concern    Not on file     Social History Narrative    No narrative on file         Vital Signs Range (Last 24H):  Temp:  [36.4 °C (97.6 °F)-37.1 °C (98.8 °F)]   Pulse:  [63-83]   Resp:  [12-20]   BP: (109-168)/(49-92)   SpO2:  [95 %-100 %]       CBC:   Recent Labs      05/28/17   1350  05/29/17   0108   WBC  8.70  7.08  7.08  7.08 "   RBC  2.46*  3.24*  3.24*  3.24*   HGB  7.4*  9.6*  9.6*  9.6*   HCT  21.7*  28.2*  28.2*  28.2*   PLT  133*  118*  118*  118*   MCV  88  87  87  87   MCH  30.1  29.6  29.6  29.6   MCHC  34.1  34.0  34.0  34.0       CMP:   Recent Labs      05/28/17   0334  05/29/17   0108   NA  141  140   K  3.9  3.5   CL  110  111*   CO2  23  21*   BUN  13  7   CREATININE  0.8  0.8   GLU  96  101   MG  1.5*  1.6   PHOS  4.2  3.4   CALCIUM  7.9*  8.3*   ALBUMIN  2.3*  2.5*   PROT  4.7*  5.1*   ALKPHOS  76  81   ALT  47*  41   AST  38  35   BILITOT  0.4  1.0       INR  No results for input(s): INR, PROTIME, APTT in the last 72 hours.    Invalid input(s): PT        Diagnostic Studies: MRI brain  Small to punctate-sized scattered foci of signal abnormality cerebral hemispheres, left cerebellum and brainstem similarly seen on the prior allowing for motion limitation.  This is superimposed on confluent regions of T2 flair signal abnormality supratentorial white matter and jose in light of corpus callosal involvement most suggestive for areas of recent or active demyelination superimposed on prior demyelination.  However alternative differential including areas of infarction with remote lacunar-type infarcts and age advanced chronic microvascular ischemic changes not excluded.    There is a small focus enhancement in the left dorsal jose differential to include but not limited to active demyelination versus subacute infarction.    Allowing for motion compared to prior there is no significant interval change.  No hydrocephalus or midline shift.    Clinical correlation and follow evaluation with additional imaging with alternative sedation methods to be considered in light of persistent motion limitation.      EKG:  Normal sinus rhythm  Minimal voltage criteria for LVH, may be normal variant    When compared with ECG of 13-MAY-2017 01:39,  Nonspecific T wave abnormality no longer evident in Inferior leads  Confirmed by SHEREE  HUMZA SANTOS ([id number]) on 5/23/2017 2:49:56 PM    2D Echo:   1 - Mild left atrial enlargement.     2 - Normal left ventricular systolic function (EF 60-65%).     3 - No wall motion abnormalities.     4 - Indeterminate LV diastolic function.     5 - Normal right ventricular systolic function .       Anesthesia Evaluation    I have reviewed the Patient Summary Reports.    I have reviewed the Nursing Notes.   I have reviewed the Medications.     Review of Systems  Anesthesia Hx:  No problems with previous Anesthesia  History of prior surgery of interest to airway management or planning: Denies Family Hx of Anesthesia complications.   Denies Personal Hx of Anesthesia complications.   Hematology/Oncology:         -- Anemia:   Cardiovascular:   Exercise tolerance: poor Hypertension Denies CAD.           Pulmonary:   Denies COPD.  Denies Asthma. Sleep Apnea    Renal/:   Denies Chronic Renal Disease.     Hepatic/GI:   Denies GERD. Liver Disease,    Neurological:   CVA Denies Seizures.    Endocrine:   Diabetes, poorly controlled        Physical Exam  General:  Well nourished    Airway/Jaw/Neck:  Airway Findings: Mouth Opening: Normal Tongue: Normal  General Airway Assessment: Adult  Mallampati: III  Improves to II with phonation.  TM Distance: 4-6 cm  Jaw/Neck Findings:  Neck ROM: Normal ROM      Dental:  Dental Findings:    Chest/Lungs:  Chest/Lungs Findings: Clear to auscultation, Normal Respiratory Rate     Heart/Vascular:  Heart Findings: Rate: Normal  Rhythm: Regular Rhythm        Mental Status:  Mental Status Findings:  Cooperative, Alert and Oriented         Anesthesia Plan  Type of Anesthesia, risks & benefits discussed:  Anesthesia Type:  general, MAC  Patient's Preference:   Intra-op Monitoring Plan: standard ASA monitors  Intra-op Monitoring Plan Comments:   Post Op Pain Control Plan:   Post Op Pain Control Plan Comments:   Induction:   IV  Beta Blocker:  Patient is not currently on a Beta-Blocker (No further  documentation required).       Informed Consent: Patient representative understands risks and agrees with Anesthesia plan.  Questions answered. Anesthesia consent signed with patient representative.  ASA Score: 3     Day of Surgery Review of History & Physical:    H&P update referred to the surgeon.         Ready For Surgery From Anesthesia Perspective.

## 2017-05-29 NOTE — SUBJECTIVE & OBJECTIVE
Neurologic Chief Complaint: MS vs cardio-embolic vs watershade stroke     Subjective:     Interval History: Patient is seen for follow-up neurological assessment and treatment recommendations:     HALINA. Improvement in neurological status. Still aphasic. However, communicating with family through gesture.     HPI, Past Medical, Family, and Social History remains the same as documented in the initial encounter.     Review of Systems   Unable to perform ROS: Patient nonverbal     Scheduled Meds:   atorvastatin  40 mg Oral Daily    cefTRIAXone (ROCEPHIN) IVPB  1 g Intravenous Q24H    diltiaZEM  60 mg Oral Q8H    insulin aspart  18 Units Subcutaneous TIDWM    insulin detemir  35 Units Subcutaneous QHS    levetiracetam IVPB  500 mg Intravenous Q12H    sodium chloride 0.9%  1,000 mL Intravenous Once    vitamin D  5,000 Units Oral Daily     Continuous Infusions:   sodium chloride 0.9% 75 mL/hr at 05/29/17 1400    pantoprazole 40 mg in dextrose 5 % 100 mL infusion (ready to mix system) 8 mg/hr (05/29/17 1400)     PRN Meds:sodium chloride, sodium chloride, acetaminophen, dextrose 50%, dextrose 50%, glucagon (human recombinant), glucose, glucose, insulin aspart, omnipaque    Objective:     Vital Signs (Most Recent):  Temp: 98.1 °F (36.7 °C) (05/29/17 1100)  Pulse: 64 (05/29/17 1400)  Resp: 18 (05/29/17 1400)  BP: (!) 160/83 (05/29/17 1400)  SpO2: 99 % (05/29/17 1400)  BP Location: Left arm    Vital Signs Range (Last 24H):  Temp:  [98 °F (36.7 °C)-98.8 °F (37.1 °C)]   Pulse:  [63-83]   Resp:  [11-20]   BP: (140-168)/(69-92)   SpO2:  [95 %-100 %]   BP Location: Left arm    Physical Exam   Constitutional: He appears well-developed and well-nourished.   HENT:   Head: Normocephalic and atraumatic.   Eyes: Conjunctivae and EOM are normal. Pupils are equal, round, and reactive to light.   Neck: Normal range of motion. Neck supple.   Cardiovascular: Normal rate and regular rhythm.    Murmur (systolic)  heard.  Pulmonary/Chest: Effort normal and breath sounds normal.   Abdominal: Soft. Bowel sounds are normal. He exhibits no distension and no mass. There is no tenderness.   Musculoskeletal: He exhibits no edema or deformity.   Neurological: He is alert. No cranial nerve deficit. Coordination normal. GCS eye subscore is 4. GCS verbal subscore is 5. GCS motor subscore is 6.   Skin: Skin is warm and dry. No rash noted. No erythema.   Psychiatric: He has a normal mood and affect. His behavior is normal.   Nursing note and vitals reviewed.      [place] Coma Scale:  Best Eye Response: 4  Best Motor Response: 6  Best Verbal Response: 5        Laboratory:  CMP:   Recent Labs  Lab 05/29/17  0108   CALCIUM 8.3*   ALBUMIN 2.5*   PROT 5.1*      K 3.5   CO2 21*   *   BUN 7   CREATININE 0.8   ALKPHOS 81   ALT 41   AST 35   BILITOT 1.0     CBC:   Recent Labs  Lab 05/29/17  0108 05/29/17  1133   WBC 7.08  7.08  7.08  --    RBC 3.24*  3.24*  3.24*  --    HGB 9.6*  9.6*  9.6* 9.1*   HCT 28.2*  28.2*  28.2*  --    *  118*  118*  --    MCV 87  87  87  --    MCH 29.6  29.6  29.6  --    MCHC 34.0  34.0  34.0  --      Lipid Panel: No results for input(s): CHOL, LDLCALC, HDL, TRIG in the last 168 hours.  Hgb A1C: No results for input(s): HGBA1C in the last 168 hours.  TSH:   Recent Labs  Lab 05/24/17  0626   TSH 1.786

## 2017-05-30 LAB
ALBUMIN SERPL BCP-MCNC: 2.7 G/DL
ALP SERPL-CCNC: 90 U/L
ALT SERPL W/O P-5'-P-CCNC: 36 U/L
ANION GAP SERPL CALC-SCNC: 11 MMOL/L
AST SERPL-CCNC: 29 U/L
BASOPHILS # BLD AUTO: 0 K/UL
BASOPHILS NFR BLD: 0 %
BILIRUB SERPL-MCNC: 0.7 MG/DL
BUN SERPL-MCNC: 5 MG/DL
CALCIUM SERPL-MCNC: 8.8 MG/DL
CHLORIDE SERPL-SCNC: 109 MMOL/L
CO2 SERPL-SCNC: 22 MMOL/L
CREAT SERPL-MCNC: 0.9 MG/DL
DIASTOLIC DYSFUNCTION: NO
DIFFERENTIAL METHOD: ABNORMAL
EOSINOPHIL # BLD AUTO: 0.1 K/UL
EOSINOPHIL NFR BLD: 0.9 %
ERYTHROCYTE [DISTWIDTH] IN BLOOD BY AUTOMATED COUNT: 14.5 %
EST. GFR  (AFRICAN AMERICAN): >60 ML/MIN/1.73 M^2
EST. GFR  (NON AFRICAN AMERICAN): >60 ML/MIN/1.73 M^2
GLUCOSE SERPL-MCNC: 98 MG/DL
HCT VFR BLD AUTO: 28.8 %
HGB BLD-MCNC: 9.9 G/DL
LYMPHOCYTES # BLD AUTO: 1.3 K/UL
LYMPHOCYTES NFR BLD: 23 %
MAGNESIUM SERPL-MCNC: 1.7 MG/DL
MCH RBC QN AUTO: 29.4 PG
MCHC RBC AUTO-ENTMCNC: 34.4 %
MCV RBC AUTO: 86 FL
MONOCYTES # BLD AUTO: 0.6 K/UL
MONOCYTES NFR BLD: 10.6 %
NEUTROPHILS # BLD AUTO: 3.7 K/UL
NEUTROPHILS NFR BLD: 65.1 %
PHOSPHATE SERPL-MCNC: 3.9 MG/DL
PLATELET # BLD AUTO: 137 K/UL
PMV BLD AUTO: 8.4 FL
POCT GLUCOSE: 106 MG/DL (ref 70–110)
POCT GLUCOSE: 91 MG/DL (ref 70–110)
POTASSIUM SERPL-SCNC: 3.7 MMOL/L
PROT SERPL-MCNC: 5.8 G/DL
RBC # BLD AUTO: 3.37 M/UL
RETIRED EF AND QEF - SEE NOTES: 60 (ref 55–65)
SODIUM SERPL-SCNC: 142 MMOL/L
WBC # BLD AUTO: 5.65 K/UL

## 2017-05-30 PROCEDURE — 20000000 HC ICU ROOM

## 2017-05-30 PROCEDURE — 80053 COMPREHEN METABOLIC PANEL: CPT

## 2017-05-30 PROCEDURE — 99233 SBSQ HOSP IP/OBS HIGH 50: CPT | Mod: ,,, | Performed by: PSYCHIATRY & NEUROLOGY

## 2017-05-30 PROCEDURE — 99232 SBSQ HOSP IP/OBS MODERATE 35: CPT | Mod: ,,, | Performed by: NURSE PRACTITIONER

## 2017-05-30 PROCEDURE — 83735 ASSAY OF MAGNESIUM: CPT

## 2017-05-30 PROCEDURE — G8997 SWALLOW GOAL STATUS: HCPCS | Mod: CH

## 2017-05-30 PROCEDURE — 92610 EVALUATE SWALLOWING FUNCTION: CPT

## 2017-05-30 PROCEDURE — 93306 TTE W/DOPPLER COMPLETE: CPT

## 2017-05-30 PROCEDURE — C9113 INJ PANTOPRAZOLE SODIUM, VIA: HCPCS | Performed by: NURSE PRACTITIONER

## 2017-05-30 PROCEDURE — 93306 TTE W/DOPPLER COMPLETE: CPT | Mod: 26,,, | Performed by: INTERNAL MEDICINE

## 2017-05-30 PROCEDURE — 84100 ASSAY OF PHOSPHORUS: CPT

## 2017-05-30 PROCEDURE — 63600175 PHARM REV CODE 636 W HCPCS: Performed by: STUDENT IN AN ORGANIZED HEALTH CARE EDUCATION/TRAINING PROGRAM

## 2017-05-30 PROCEDURE — 25000003 PHARM REV CODE 250: Performed by: HOSPITALIST

## 2017-05-30 PROCEDURE — 25000003 PHARM REV CODE 250: Performed by: STUDENT IN AN ORGANIZED HEALTH CARE EDUCATION/TRAINING PROGRAM

## 2017-05-30 PROCEDURE — 63600175 PHARM REV CODE 636 W HCPCS: Performed by: NURSE PRACTITIONER

## 2017-05-30 PROCEDURE — 94761 N-INVAS EAR/PLS OXIMETRY MLT: CPT

## 2017-05-30 PROCEDURE — 85025 COMPLETE CBC W/AUTO DIFF WBC: CPT

## 2017-05-30 PROCEDURE — 92523 SPEECH SOUND LANG COMPREHEN: CPT

## 2017-05-30 RX ORDER — PANTOPRAZOLE SODIUM 40 MG/10ML
40 INJECTION, POWDER, LYOPHILIZED, FOR SOLUTION INTRAVENOUS 2 TIMES DAILY
Status: DISCONTINUED | OUTPATIENT
Start: 2017-05-30 | End: 2017-06-02

## 2017-05-30 RX ADMIN — LEVETIRACETAM 500 MG: 5 INJECTION INTRAVENOUS at 08:05

## 2017-05-30 RX ADMIN — LEVETIRACETAM 500 MG: 5 INJECTION INTRAVENOUS at 09:05

## 2017-05-30 RX ADMIN — DEXTROSE 8 MG/HR: 50 INJECTION, SOLUTION INTRAVENOUS at 12:05

## 2017-05-30 RX ADMIN — INSULIN ASPART 1 UNITS: 100 INJECTION, SOLUTION INTRAVENOUS; SUBCUTANEOUS at 09:05

## 2017-05-30 RX ADMIN — DEXTROSE 8 MG/HR: 50 INJECTION, SOLUTION INTRAVENOUS at 07:05

## 2017-05-30 RX ADMIN — DEXTROSE 8 MG/HR: 50 INJECTION, SOLUTION INTRAVENOUS at 04:05

## 2017-05-30 RX ADMIN — PANTOPRAZOLE SODIUM 40 MG: 40 INJECTION, POWDER, FOR SOLUTION INTRAVENOUS at 10:05

## 2017-05-30 RX ADMIN — SODIUM CHLORIDE: 0.9 INJECTION, SOLUTION INTRAVENOUS at 08:05

## 2017-05-30 RX ADMIN — PANTOPRAZOLE SODIUM 40 MG: 40 INJECTION, POWDER, FOR SOLUTION INTRAVENOUS at 09:05

## 2017-05-30 RX ADMIN — DILTIAZEM HYDROCHLORIDE 60 MG: 60 TABLET, FILM COATED ORAL at 06:05

## 2017-05-30 RX ADMIN — DILTIAZEM HYDROCHLORIDE 60 MG: 60 TABLET, FILM COATED ORAL at 09:05

## 2017-05-30 RX ADMIN — DILTIAZEM HYDROCHLORIDE 60 MG: 60 TABLET, FILM COATED ORAL at 02:05

## 2017-05-30 RX ADMIN — INSULIN ASPART 2 UNITS: 100 INJECTION, SOLUTION INTRAVENOUS; SUBCUTANEOUS at 05:05

## 2017-05-30 NOTE — PROGRESS NOTES
[facility]  Vascular Neurology  Comprehensive Stroke Center  Progress Note      Assessment/Plan:     56 y/o male with possible MS treated with steroids on last admission. Patient has been having intermittent aphasia, inattention, and delayed response. Repeat MRI revealed new lesions with one lesion in R frontal lobe concerning for stroke but very degrade by motion. Patient with no real focal neuro deficits, moving all extremities, flat affect, follows commands but has inattention and delayed response. Symptoms are waxing and waning.     5/29: Repeated MRI is limited 2/2 motion artifact, revealing multiple scattered foci suggesting demyelination vs stroke. Given recent MRI there suspicion of cardio-embolic phenomenon. Plan for GIOVANNI after EGD given h/o melena and drop in H/H.        5/30: EGD done which revealed duodenal and gastric ulcer treated with cautery. Bedside Echo WNL. Bcx NGTD. Cardiology postponed GIOVANNI given multiple ulcers seen during EGD.  Awaiting spine MRI     Upper GI bleed    -- Per acute blood loss         Acute blood loss anemia    -- Risk factor for watershade infarct   -- Multiple gastric and duodenal ulcers treated via EGD   -- On PPI  -- Given h/o GI bleeding pt would benefit from clopidogrel instead of aspirin for stroke prevention         Type 2 diabetes mellitus with hyperglycemia, with long-term current use of insulin    Stroke risk factor  A1C 11.4 on 5/13/17  Continue aspart and detemir   on medication compliance prior to discharge        Essential hypertension    Stroke risk factor  Per 1ry team         Mixed hyperlipidemia    Stroke risk factor  Continue atorvastatin 40        * Encephalopathy    Patient with intermittent aphasia, inattention, and delayed response    Area in R frontal lobe concerning for possible new infarct. Patient has hyperdensity in DWI and correlating hypodensity in ADC. However this new lesion may reflect a new demyelinating lesion.  Depending on stage of a demyelinating lesion, it could appear as an acute stroke on MRI. At this point, it is difficult to ascertain the cause of the R frontal lesion. Therefore, would treat as an acute stroke until proven otherwise.    Repeated MRI is limited 2/2 motion artifact, revealing multiple scattered foci suggesting demyelination vs stroke.    Recommendations:  -- Repeat LP  -- Repeat MRI spine to look for spinal lesions  -- Continue atorvastatin 40  -- Given h/o GI bleeding pt would benefit from clopidogrel instead of aspirin for stroke prevention   -- SBP <220  -- Sq heparin for DVT prophylaxis  -- PT/OT and speech to evaluate and treat  -- Neuro checks q2-4h            Neurologic Chief Complaint: MS vs cardio-embolic vs watershade stroke     Subjective:     Interval History: Patient is seen for follow-up neurological assessment and treatment recommendations:     NAEON. EGD and TTE done. Feels better.     HPI, Past Medical, Family, and Social History remains the same as documented in the initial encounter.     Review of Systems   Unable to perform ROS: Patient nonverbal     Scheduled Meds:   atorvastatin  40 mg Oral Daily    cefTRIAXone (ROCEPHIN) IVPB  1 g Intravenous Q24H    diltiaZEM  60 mg Oral Q8H    insulin aspart  18 Units Subcutaneous TIDWM    insulin detemir  35 Units Subcutaneous QHS    levetiracetam IVPB  500 mg Intravenous Q12H    pantoprazole  40 mg Intravenous BID    sodium chloride 0.9%  1,000 mL Intravenous Once    vitamin D  5,000 Units Oral Daily     Continuous Infusions:   sodium chloride 0.9% 75 mL/hr at 05/30/17 1000     PRN Meds:sodium chloride, sodium chloride, acetaminophen, dextrose 50%, dextrose 50%, glucagon (human recombinant), glucose, glucose, insulin aspart, omnipaque    Objective:     Vital Signs (Most Recent):  Temp: 97.7 °F (36.5 °C) (05/30/17 0700)  Pulse: 71 (05/30/17 1000)  Resp: 18 (05/30/17 1000)  BP: 133/69 (05/30/17 1000)  SpO2: 96 % (05/30/17 1000)  BP  Location: Left arm    Vital Signs Range (Last 24H):  Temp:  [97.7 °F (36.5 °C)-98.1 °F (36.7 °C)]   Pulse:  [59-77]   Resp:  [10-26]   BP: (110-175)/()   SpO2:  [94 %-100 %]   BP Location: Left arm    Physical Exam   Constitutional: He appears well-developed and well-nourished.   HENT:   Head: Normocephalic and atraumatic.   Eyes: Conjunctivae and EOM are normal. Pupils are equal, round, and reactive to light.   Neck: Normal range of motion. Neck supple.   Cardiovascular: Normal rate and regular rhythm.    Murmur (systolic) heard.  Pulmonary/Chest: Effort normal and breath sounds normal.   Abdominal: Soft. Bowel sounds are normal. He exhibits no distension and no mass. There is no tenderness.   Musculoskeletal: He exhibits no edema or deformity.   Neurological: He is alert. No cranial nerve deficit. Coordination normal. GCS eye subscore is 4. GCS verbal subscore is 5. GCS motor subscore is 6.   Skin: Skin is warm and dry. No rash noted. No erythema.   Psychiatric: He has a normal mood and affect. His behavior is normal.   Nursing note and vitals reviewed.      [place] Coma Scale:  Best Eye Response: 4  Best Motor Response: 6  Best Verbal Response: 5        Laboratory:  CMP:     Recent Labs  Lab 05/30/17  0535   CALCIUM 8.8   ALBUMIN 2.7*   PROT 5.8*      K 3.7   CO2 22*      BUN 5*   CREATININE 0.9   ALKPHOS 90   ALT 36   AST 29   BILITOT 0.7     CBC:     Recent Labs  Lab 05/30/17  0634   WBC 5.65   RBC 3.37*   HGB 9.9*   HCT 28.8*   *   MCV 86   MCH 29.4   MCHC 34.4     Lipid Panel: No results for input(s): CHOL, LDLCALC, HDL, TRIG in the last 168 hours.  Hgb A1C: No results for input(s): HGBA1C in the last 168 hours.  TSH:     Recent Labs  Lab 05/24/17  0626   TSH 1.786           Salima Brown MD  [facility]  Department of Vascular Neurology   [facility]

## 2017-05-30 NOTE — SUBJECTIVE & OBJECTIVE
Subjective:     Interval History: Patient continues to wax and wane. Continued blurred vision.  No new  complaints today. Denies HA.    Current Neurological Medications: Keppra     Current Facility-Administered Medications   Medication Dose Route Frequency Provider Last Rate Last Dose    0.9%  NaCl infusion (for blood administration)   Intravenous Q24H PRN Guanaco Story MD        0.9%  NaCl infusion (for blood administration)   Intravenous Q24H PRN Sandra Reyes PA-C        0.9%  NaCl infusion   Intravenous Continuous Elvira Hendricks MD 75 mL/hr at 05/30/17 1300      acetaminophen tablet 650 mg  650 mg Oral Q6H PRN Gilberto Díaz MD   650 mg at 05/28/17 1243    atorvastatin tablet 40 mg  40 mg Oral Daily Donna Jason MD   Stopped at 05/30/17 0900    cefTRIAXone (ROCEPHIN) 1 g in dextrose 5 % 50 mL IVPB  1 g Intravenous Q24H Josefina Urena MD   1 g at 05/29/17 2124    dextrose 50% injection 12.5 g  12.5 g Intravenous PRN Donna Jason MD   12.5 g at 05/28/17 0936    dextrose 50% injection 25 g  25 g Intravenous PRN Donna Jason MD        diltiaZEM tablet 60 mg  60 mg Oral Q8H Guanaco Story MD   60 mg at 05/30/17 0600    glucagon (human recombinant) injection 1 mg  1 mg Intramuscular PRN Donna Jason MD        glucose chewable tablet 16 g  16 g Oral PRN Donna Jason MD        glucose chewable tablet 24 g  24 g Oral PRN Donna Jason MD        insulin aspart pen 0-5 Units  0-5 Units Subcutaneous QID (AC + HS) PRN Donna Jason MD   3 Units at 05/29/17 1133    insulin aspart pen 18 Units  18 Units Subcutaneous TIDWM Josefina Urena MD   Stopped at 05/30/17 1130    insulin detemir pen 35 Units  35 Units Subcutaneous QHS Guanaco Story MD   17 Units at 05/29/17 2125    levetiracetam in NaCl (iso-os) IVPB 500 mg  500 mg Intravenous Q12H Guanaco Story MD  200 mL/hr at 05/30/17 0827 500 mg at 05/30/17 0827    omnipaque oral solution 15 mL  15 mL Oral PRN Horace Hernandez MD        pantoprazole injection 40 mg  40 mg Intravenous BID William French NP   40 mg at 05/30/17 1042    sodium chloride 0.9% bolus 1,000 mL  1,000 mL Intravenous Once Guanaco Story MD        vitamin D 1000 units tablet 5,000 Units  5,000 Units Oral Daily Guanaco Story MD   Stopped at 05/30/17 0900       Review of Systems   Constitutional: Positive for activity change.   Eyes: Positive for visual disturbance.   Respiratory: Negative for shortness of breath.    Cardiovascular: Negative for chest pain.   Gastrointestinal: Negative for nausea and vomiting.   Skin: Negative for rash and wound.   Neurological:        Periods of decreased responsiveness    Psychiatric/Behavioral: Positive for confusion and decreased concentration. Negative for agitation and behavioral problems.     Objective:     Vital Signs (Most Recent):  Temp: 98 °F (36.7 °C) (05/30/17 1100)  Pulse: 72 (05/30/17 1300)  Resp: 13 (05/30/17 1300)  BP: (!) 140/71 (05/30/17 1300)  SpO2: 98 % (05/30/17 1300) Vital Signs (24h Range):  Temp:  [97.7 °F (36.5 °C)-98.1 °F (36.7 °C)] 98 °F (36.7 °C)  Pulse:  [59-77] 72  Resp:  [10-26] 13  SpO2:  [94 %-100 %] 98 %  BP: (110-175)/() 140/71     Weight: 118.4 kg (261 lb 0.4 oz)  Body mass index is 36.41 kg/m².    Physical Exam   Constitutional: He appears well-developed and well-nourished. No distress.   HENT:   Head: Normocephalic and atraumatic.   Pulmonary/Chest: Effort normal.   Neurological: He is alert. He has a normal Finger-Nose-Finger Test.   Reflex Scores:       Bicep reflexes are 1+ on the right side and 1+ on the left side.       Brachioradialis reflexes are 1+ on the right side and 1+ on the left side.       Patellar reflexes are 1+ on the right side and 1+ on the left side.  Skin: Skin is warm and dry. He is not diaphoretic.   Psychiatric: His speech is  normal.   Slightly withdrawn.  Less expressive than last admission.  Paucity of speech.   Nursing note and vitals reviewed.      NEUROLOGICAL EXAMINATION:     MENTAL STATUS   Attention: decreased. Concentration: normal.   Speech: speech is normal   Level of consciousness: alert       Oriented to name but not age.  Oriented to hospital, but thinks he is in [place].  Not sure what floor we are on.  Knows it's May 2017, but does not know date or day of the week.    Able to spell WORLD forwards but not backwards.  Cannot subtract 7 from 100.     CRANIAL NERVES     CN II   Visual fields full to confrontation.     CN V   Facial sensation intact.     CN VII   Facial expression full, symmetric.     CN VIII   Hearing: intact    CN XI   Right sternocleidomastoid strength: normal  Left sternocleidomastoid strength: normal    CN XII   Tongue: not atrophic  Fasciculations: absent  Tongue deviation: none       Mild L JAYDEN.  Improving.     MOTOR EXAM   Muscle bulk: normal  Overall muscle tone: normal    Strength   Right biceps: 5/5  Left biceps: 5/5  Right triceps: 5/5  Left triceps: 5/5  Right quadriceps: 5/5  Left quadriceps: 4/5       Weaker on LLE than RLE today.       REFLEXES     Reflexes   Right brachioradialis: 1+  Left brachioradialis: 1+  Right biceps: 1+  Left biceps: 1+  Right patellar: 1+  Left patellar: 1+    SENSORY EXAM   Light touch normal.     GAIT AND COORDINATION      Coordination   Finger to nose coordination: normal      Significant Labs:   Hemoglobin A1c:     Recent Labs  Lab 05/13/17  0138   HGBA1C 11.4*     Blood Culture:     Recent Labs  Lab 05/28/17  1601   LABBLOO No Growth to date  No Growth to date  No Growth to date  No Growth to date     BMP:     Recent Labs  Lab 05/29/17  0108 05/30/17  0535    98    142   K 3.5 3.7   * 109   CO2 21* 22*   BUN 7 5*   CREATININE 0.8 0.9   CALCIUM 8.3* 8.8   MG 1.6 1.7     CBC:     Recent Labs  Lab 05/28/17  1350 05/29/17  0108 05/29/17  1133  05/29/17  2131 05/30/17  0634   WBC 8.70 7.08  7.08  7.08  --   --  5.65   HGB 7.4* 9.6*  9.6*  9.6* 9.1* 9.4* 9.9*   HCT 21.7* 28.2*  28.2*  28.2*  --   --  28.8*   * 118*  118*  118*  --   --  137*     CMP:     Recent Labs  Lab 05/29/17  0108 05/30/17  0535    98    142   K 3.5 3.7   * 109   CO2 21* 22*   BUN 7 5*   CREATININE 0.8 0.9   CALCIUM 8.3* 8.8   MG 1.6 1.7   PROT 5.1* 5.8*   ALBUMIN 2.5* 2.7*   BILITOT 1.0 0.7   ALKPHOS 81 90   AST 35 29   ALT 41 36   ANIONGAP 8 11   EGFRNONAA >60.0 >60.0     CSF Culture: No results for input(s): CSFCULTURE in the last 48 hours.  CSF Studies: No results for input(s): ALIQUT, APPEARCSF, COLORCSF, CSFWBC, CSFRBC, GLUCCSF, LDHCSF, PROTEINCSF, VDRLCSF in the last 48 hours.  Inflammatory Markers: No results for input(s): SEDRATE, CRP, PROCAL in the last 48 hours.  POCT Glucose:     Recent Labs  Lab 05/29/17  1720 05/30/17  0749 05/30/17  1049   POCTGLUCOSE 111* 106 91     Respiratory Culture: No results for input(s): GSRESP, RESPIRATORYC in the last 48 hours.  Urine Culture: No results for input(s): LABURIN in the last 48 hours.  Urine Studies: No results for input(s): COLORU, APPEARANCEUA, PHUR, SPECGRAV, PROTEINUA, GLUCUA, KETONESU, BILIRUBINUA, OCCULTUA, NITRITE, UROBILINOGEN, LEUKOCYTESUR, RBCUA, WBCUA, BACTERIA, SQUAMEPITHEL, HYALINECASTS in the last 48 hours.    Invalid input(s): WRIGHTSUR  Recent Lab Results       05/30/17  1049 05/30/17  0830 05/30/17  0749 05/30/17  0634 05/30/17  0535      Albumin     2.7(L)     Alkaline Phosphatase     90     ALT     36     Anion Gap     11     AST     29     Baso #    0.00      Basophil%    0.0      Total Bilirubin     0.7  Comment:  For infants and newborns, interpretation of results should be based  on gestational age, weight and in agreement with clinical  observations.  Premature Infant recommended reference ranges:  Up to 24 hours.............<8.0 mg/dL  Up to 48 hours............<12.0  mg/dL  3-5 days..................<15.0 mg/dL  6-29 days.................<15.0 mg/dL       BUN, Bld     5(L)     Calcium     8.8     Chloride     109     CO2     22(L)     Creatinine     0.9     Diastolic Dysfunction  No        Differential Method    Automated      EF  60        eGFR if      >60.0     eGFR if non      >60.0  Comment:  Calculation used to obtain the estimated glomerular filtration  rate (eGFR) is the CKD-EPI equation. Since race is unknown   in our information system, the eGFR values for   -American and Non--American patients are given   for each creatinine result.       Eos #    0.1      Eosinophil%    0.9      Glucose     98     Gran #    3.7      Gran%    65.1      Hematocrit    28.8(L)      Hemoglobin    9.9(L)      Lymph #    1.3      Lymph%    23.0      Magnesium     1.7     MCH    29.4      MCHC    34.4      MCV    86      Mono #    0.6      Mono%    10.6      MPV    8.4(L)      Phosphorus     3.9     Platelets    137(L)      POCT Glucose 91  106       Potassium     3.7     Total Protein     5.8(L)     RBC    3.37(L)      RDW    14.5      Sodium     142     WBC    5.65                  05/29/17  2131 05/29/17  1720      Albumin       Alkaline Phosphatase       ALT       Anion Gap       AST       Baso #       Basophil%       Total Bilirubin       BUN, Bld       Calcium       Chloride       CO2       Creatinine       Diastolic Dysfunction       Differential Method       EF       eGFR if        eGFR if non        Eos #       Eosinophil%       Glucose       Gran #       Gran%       Hematocrit       Hemoglobin 9.4(L)      Lymph #       Lymph%       Magnesium       MCH       MCHC       MCV       Mono #       Mono%       MPV       Phosphorus       Platelets       POCT Glucose  111(H)     Potassium       Total Protein       RBC       RDW       Sodium       WBC             Significant Imaging:  MRI brain 5/13/17:  Multiple  foci of FLAIR hyperintensity in the deep cerebral periventricular white matter in a configuration and distribution which can be seen in the setting of underlying demyelinating process such as multiple sclerosis. There are several punctate foci of restricted diffusion including the left aspect of the midbrain tectum which in light of the aforementioned white matter changes may reflect regions of recent demyelination. Superimposed small acute infarcts would be difficult to exclude, although are felt less likely given the overall constellation of findings. Additionally, there is a 0.7 cm enhancing T2/FLAIR identified in the anterior right thalamus concerning for focus of active demyelination.           Also old lesions in corpus collosum         CTH 5/26/17  No detrimental change in this patient with volume loss, and multiple areas of hyperattenuation consistent with a infarcts, and periventricular hypoattenuation likely related to the patient's demyelinating disease.    MRI Brain 5/26/17  Please note evaluation is markedly limited by motion artifact, and the patient could not complete the exam.  However, the limited diffusion MRI findings are most compatible with acute infarction involving the right frontal and right temporal lobes as well as the left cerebellar hemisphere and left brainstem, as above.  No evidence of intracranial hemorrhage.    MRI Brain 5/28/17   Small to punctate-sized scattered foci of signal abnormality cerebral hemispheres, left cerebellum and brainstem similarly seen on the prior allowing for motion limitation.  This is superimposed on confluent regions of T2 flair signal abnormality supratentorial white matter and jose in light of corpus callosal involvement most suggestive for areas of recent or active demyelination superimposed on prior demyelination.  However alternative differential including areas of infarction with remote lacunar-type infarcts and age advanced chronic microvascular  ischemic changes not excluded.    There is a small focus enhancement in the left dorsal jose differential to include but not limited to active demyelination versus subacute infarction.    Allowing for motion compared to prior there is no significant interval change.  No hydrocephalus or midline shift.

## 2017-05-30 NOTE — ASSESSMENT & PLAN NOTE
Patient demonstrates waxing and waning of mental status throughout the day, with numerous additional lesions seen on MRI suggestive of demyelination>>stroke    F/u  anti-Phospholipid ab/cardiolipin ab  Continue empiric keppra 500mg bid for now, given hx  GIOVANNI, as above    Lyme titer (neg), WNV (neg), SSA (neg) , SSB (neg), dsDNA (neg), SS-A (neg), SS-B (neg), ESR 15, CRP 5.37, HIV neg, REYMUNDO (neg)     F/u autoimmune encephalopathy panel    24h EEG 5/26 negative for simple partial seizures / underlying etiology of waxing and waning MS

## 2017-05-30 NOTE — SUBJECTIVE & OBJECTIVE
Neurologic Chief Complaint: MS vs cardio-embolic vs watershade stroke     Subjective:     Interval History: Patient is seen for follow-up neurological assessment and treatment recommendations:     NAEON. EGD and TTE done. Feels better.     HPI, Past Medical, Family, and Social History remains the same as documented in the initial encounter.     Review of Systems   Unable to perform ROS: Patient nonverbal     Scheduled Meds:   atorvastatin  40 mg Oral Daily    cefTRIAXone (ROCEPHIN) IVPB  1 g Intravenous Q24H    diltiaZEM  60 mg Oral Q8H    insulin aspart  18 Units Subcutaneous TIDWM    insulin detemir  35 Units Subcutaneous QHS    levetiracetam IVPB  500 mg Intravenous Q12H    pantoprazole  40 mg Intravenous BID    sodium chloride 0.9%  1,000 mL Intravenous Once    vitamin D  5,000 Units Oral Daily     Continuous Infusions:   sodium chloride 0.9% 75 mL/hr at 05/30/17 1000     PRN Meds:sodium chloride, sodium chloride, acetaminophen, dextrose 50%, dextrose 50%, glucagon (human recombinant), glucose, glucose, insulin aspart, omnipaque    Objective:     Vital Signs (Most Recent):  Temp: 97.7 °F (36.5 °C) (05/30/17 0700)  Pulse: 71 (05/30/17 1000)  Resp: 18 (05/30/17 1000)  BP: 133/69 (05/30/17 1000)  SpO2: 96 % (05/30/17 1000)  BP Location: Left arm    Vital Signs Range (Last 24H):  Temp:  [97.7 °F (36.5 °C)-98.1 °F (36.7 °C)]   Pulse:  [59-77]   Resp:  [10-26]   BP: (110-175)/()   SpO2:  [94 %-100 %]   BP Location: Left arm    Physical Exam   Constitutional: He appears well-developed and well-nourished.   HENT:   Head: Normocephalic and atraumatic.   Eyes: Conjunctivae and EOM are normal. Pupils are equal, round, and reactive to light.   Neck: Normal range of motion. Neck supple.   Cardiovascular: Normal rate and regular rhythm.    Murmur (systolic) heard.  Pulmonary/Chest: Effort normal and breath sounds normal.   Abdominal: Soft. Bowel sounds are normal. He exhibits no distension and no mass. There  is no tenderness.   Musculoskeletal: He exhibits no edema or deformity.   Neurological: He is alert. No cranial nerve deficit. Coordination normal. GCS eye subscore is 4. GCS verbal subscore is 5. GCS motor subscore is 6.   Skin: Skin is warm and dry. No rash noted. No erythema.   Psychiatric: He has a normal mood and affect. His behavior is normal.   Nursing note and vitals reviewed.      Jarod Coma Scale:  Best Eye Response: 4  Best Motor Response: 6  Best Verbal Response: 5        Laboratory:  CMP:     Recent Labs  Lab 05/30/17  0535   CALCIUM 8.8   ALBUMIN 2.7*   PROT 5.8*      K 3.7   CO2 22*      BUN 5*   CREATININE 0.9   ALKPHOS 90   ALT 36   AST 29   BILITOT 0.7     CBC:     Recent Labs  Lab 05/30/17  0634   WBC 5.65   RBC 3.37*   HGB 9.9*   HCT 28.8*   *   MCV 86   MCH 29.4   MCHC 34.4     Lipid Panel: No results for input(s): CHOL, LDLCALC, HDL, TRIG in the last 168 hours.  Hgb A1C: No results for input(s): HGBA1C in the last 168 hours.  TSH:     Recent Labs  Lab 05/24/17  0626   TSH 1.786

## 2017-05-30 NOTE — ASSESSMENT & PLAN NOTE
-large melanotic stool prior to transfer to [facility]  -H 7.4, transfuse 2 U PRBC on 5/28  -holding antiplatelets and DVT proph secondary to bleed  -possibly 2/2 recent steroids  -EGD 5/29, multiple ulcers cauterized   GI recs: PPI IV BID x 72 hrs then transition to PPI BID PO for 8 weeks   Repeat EGD in 8 weeks to assess healing   Check H pylori serology and treat if positive.   Start Reg. diet

## 2017-05-30 NOTE — PROGRESS NOTES
[facility]  Neurocritical Care  Progress Note    Admit Date: 5/23/2017  Service Date: 05/29/2017  Length of Stay: 6    Subjective:     Chief Complaint: Encephalopathy    History of Present Illness: Patient is a 58 y/o male with poorly controlled Type 2 diabetes on insulin, HTN, mixed HLP and recently diagnosed with multiple sclerosis here at [facility] 2 weeks ago and discharged last week from [facility] from Hospital Medicine Team 3 after receiving 5 days of high dose IV Solumedrol. Patient was discharged on Medrol dospepak. Patient according to Dr. Cho states double vision was improving and getting better until this 5/24 when had a severe episode of double vision and dizziness and blacked out at home. Patient came around but was complaining of severe double vision and wife noted patient also confused after coming around so went to [facility]. Patient fell on the way to the bathroom. He does not remember falling, nor having any prodrome. Wife states she heard the thump and found him sitting on the floor, staring blankly into space, and confused.      At [place], patient noted to have WBC 19, but is on steroids. Blood culture sent and CXR done that was unremarkable. CT imaging of head unremarkable for any new findings. Patient noted to have  and BUN/Cr of 40/1.1 so Dr. Cho felt patient may just be dehydrated so patient given 1 liter of NS in ER. Patient feeling better. He spoke with Neurology here at [facility] who recommended admit for evaluation due to worsening double vision but Dr. Cho thinks likely just related to dehydration as patient has been having high blood sugars at home and during recent hospitalization.      Pt admitted to hospital medicine 5/24.          Hospital Course: 5/24: admitted to hospital medicine for MS flare/worsening double vision  5/28:   -- waxing and waning of mental status throughout the day. MRI findings concerning for ischemic event  vs active demyelination, with ADC correlation of DWI abnormality. MRI shows lesions suggestive of infarct in the R frontal and temporal lobes as well as the left cerebellar hemisphere and left brainstem, correlating with multiple vascular territories. Will need to rule out toxic metabolic etiologies. Cultures thus far have been negative. Repeat UA negative.  Vascular Neurology is following. Hospital medicine recommending GIOVANNI as cardio-embolic phenomenon can present with ischemic events in multiple vascular territories. Additional labs for auto-immune causes of clinical presentation ordered.         INTERVAL HISTORY --EGD today- positive for duodenal ulcers- cauterized. Hb stable- repeat pending. General neuro consulted for work up/management  of possible MS exacerbation       Review of Systems: Constitutional: Denies fevers or chills.  Pulmonary: Denies shortness of breath or cough.  Cardiology: Denies chest pain or palpitations.  GI: Denies abdominal pain or constipation.  Neurologic: Denies new weakness, headache, or paresthesias.    Vitals:   Temp: 97.7 °F (36.5 °C) (05/29/17 1900)  Pulse: 64 (05/29/17 1922)  Resp: 19 (05/29/17 1922)  BP: (!) 153/72 (05/29/17 1922)  SpO2: 98 % (05/29/17 1922)    Temp:  [97.7 °F (36.5 °C)-98.8 °F (37.1 °C)] 97.7 °F (36.5 °C)  Pulse:  [63-83] 64  Resp:  [10-26] 19  SpO2:  [95 %-100 %] 98 %  BP: (130-168)/(62-92) 153/72              05/28 0701 - 05/29 0700  In: 3276.6 [I.V.:2278.7]  Out: 2425 [Urine:2425]     Examination:   Constitutional: Well-nourished and -developed. No apparent distress.   Eyes: Conjunctiva clear, anicteric. Lids no lesions.  Head/Ears/Nose/Mouth/Throat/Neck: Moist mucous membranes. External ears, nose atraumatic.   Cardiovascular: Regular rhythm. No murmurs. No leg edema.  Respiratory: Comfortable respirations. Clear to auscultation.  Gastrointestinal: No hernia. Soft, nondistended, nontender. + bowel sounds.     Neurologic:  -GCS [id number]  -Alert. Oriented to  person, place, and time. Speech fluent. Follows commands.  -Cranial nerves II-XII intact. PERRL  --mild RUE drift, mild R facial droop  -Sensation to soft touch intact throughout    Medications:   Continuous  sodium chloride 0.9% Last Rate: 75 mL/hr at 05/29/17 1900   pantoprazole 40 mg in dextrose 5 % 100 mL infusion (ready to mix system) Last Rate: 8 mg/hr (05/29/17 1800)   Scheduled  atorvastatin 40 mg Daily   cefTRIAXone (ROCEPHIN) IVPB 1 g Q24H   diltiaZEM 60 mg Q8H   insulin aspart 18 Units TIDWM   insulin detemir 35 Units QHS   levetiracetam IVPB 500 mg Q12H   sodium chloride 0.9% 1,000 mL Once   vitamin D 5,000 Units Daily   PRN  sodium chloride  Q24H PRN   sodium chloride  Q24H PRN   acetaminophen 650 mg Q6H PRN   dextrose 50% 12.5 g PRN   dextrose 50% 25 g PRN   glucagon (human recombinant) 1 mg PRN   glucose 16 g PRN   glucose 24 g PRN   insulin aspart 0-5 Units QID (AC + HS) PRN   omnipaque 15 mL PRN      Today I independently reviewed pertinent medications, lines/drains/airways, imaging, cardiology, lab results,   Assessment/Plan:     Neuro   Acute encephalopathy    -24 hr EEG completed- non-focal slowing- indicative of encephalopathy   -keppra 500 BID  -MRI findings concerning for ischemic event vs active demyelination  -MRI shows lesions suggestive of infarct in the R frontal and temporal lobes as well as the left cerebellar hemisphere and left brainstem, correlating with multiple vascular territories.   -rule out toxic metabolic etiologies  -Cultures thus far have been negative. Repeat blood cultures x 2   -rocephin 1g qd  -Repeat UA negative.    -Vascular Neurology is following.  -echo pending as cardio-embolic phenomenon can present with ischemic events in multiple vascular territories  -GI to perform EGD tomorrow 2/2 UGIbleed/large melanotic stool, on protonix ggt, after EGD will schedule GIOVANNI if appropriate  -work up for auto-immune causes of clinical presentation.   -repeat MRI brain and spine  scheduled for 5/29  -stat CT for neuro change   -PT OT SLP        Demyelinating changes in brain    -As seen on MRIs from 5/13, no spinal cord lesions  -S/p 5.5g solumedrol last admission  -Discussed with MS team - will need outpatient f/u appointment        Internuclear ophthalmoplegia of left eye    -Improving  -Received 5.5g solumedrol last admission  -May be 2/2 MS (suspected, but not confirmed)        Cardiac   Essential hypertension    -diltiazem 60 mg q8h  -SBP<180        Hem/Onc   Leukocytosis    -resolved  -likely 2/2 steroids        Endocrine   Type 2 diabetes mellitus with hyperglycemia, with long-term current use of insulin    -SSI  -A1C 11.4        Fluids/Electrolytes/Nutrition/GI   Upper GI bleed    -large melanotic stool prior to transfer to [facility]  -H 7.4, transfuse 2 U PRBC on 5/28  -holding antiplatelets and DVT proph secondary to bleed  -possibly 2/2 recent steroids  --continue protonix ggt  -NPO  -continue to monitor Hb    --egd today, duodenal ulcers- cauterized.         Obesity    -BMI 36.4        Mixed hyperlipidemia    -continue daily statin         Other   Acute blood loss anemia    -2/2 UGI bleed  -transfuse 2 U PRBC  -H/H 7.4/21.4            Prophylaxis:  Venous Thromboembolism: mechanical  Stress Ulcer: PPI drip      Activity Orders          Up with assistance starting at 05/28 1711    Up with assistance starting at 05/28 1635    Straight Cath starting at 05/27 0813        Full Code    Merlene Angulo PA-C  Neurocritical Care  [facility]

## 2017-05-30 NOTE — PLAN OF CARE
05/29/17 2017   Discharge Reassessment   Assessment Type Discharge Planning Reassessment   Can the patient answer the patient profile reliably? (aphasic)   How does the patient rate their overall health at the present time? Fair   Describe the patient's ability to walk at the present time. Walks with the help of equipment   How often would a person be available to care for the patient? Whenever needed   Number of comorbid conditions (as recorded on the chart) Two   During the past month, has the patient often been bothered by feeling down, depressed or hopeless? No   During the past month, has the patient often been bothered by little interest or pleasure in doing things? No   Discharge Plan A Rehab   Discharge Plan B Skilled Nursing Facility       Lois Christina RN, CCRN-K, [facility]  Neuro-Critical Care   [phone] [id number]

## 2017-05-30 NOTE — ASSESSMENT & PLAN NOTE
Improving  Received 5.5g solumedrol last admission    Patient continues to complain of blurred vision  Concern for vasculitis    Plan for 1pm appointment with Dr. Mixon (ophthalmology - retina) in the ophthal clinic for eval for possible vasculitis (Susac's syndrome?), per Dr. Love's recommendations.  Appreciate ophthal's assistance.

## 2017-05-30 NOTE — ASSESSMENT & PLAN NOTE
-- Risk factor for watershade infarct   -- Multiple gastric and duodenal ulcers treated via EGD   -- On PPI

## 2017-05-30 NOTE — H&P
TRANSESOPHAGEAL ECHOCARDIOGRAPHY   PRE-PROCEDURE NOTE    05/30/2017    HPI:     Bessy Nunez is a 57 y.o. man with PMHx of DM 2 on insulin, HTN, HLD, and recent diagnosis of multiple sclerosis who presented again on 5/23/17 with worsening double vision and an episode of syncope.  GIOVANNI requested to evaluate for cardioembolic source.    Pt had active GI bleed and had EGD yesterday that showed 5 non-bleeding cratered gastric ulcers with a visible vessel in the gastric antrum.  Coagulation was used.  One non-bleeding cratered duodenal ulcer with visible vessel was also seen.  This was also coagulated.    The patient remains on a protonix drip and received 2 units PRBCs yesterday.    He has had blood cultures from 5/26 and 28 that remain NGTD.  He has been afebrile.  He initially had an elevated WBC count, but this was in the setting of recent high dose steroids.  It has since trended down and normalized.    Dysphagia or odynophagia:  No  Liver Disease, esophageal disease, or known varices:  No  Upper GI Bleeding: Yes  Snoring:  No  Sleep Apnea:  No  Prior neck surgery or radiation:  No  History of anesthetic difficulties:  No  Family history of anesthetic difficulties:  No  Last oral intake:  24 hours ago  Able to move neck in all directions:  Yes      Meds:     Scheduled Meds:   atorvastatin  40 mg Oral Daily    cefTRIAXone (ROCEPHIN) IVPB  1 g Intravenous Q24H    diltiaZEM  60 mg Oral Q8H    insulin aspart  18 Units Subcutaneous TIDWM    insulin detemir  35 Units Subcutaneous QHS    levetiracetam IVPB  500 mg Intravenous Q12H    sodium chloride 0.9%  1,000 mL Intravenous Once    vitamin D  5,000 Units Oral Daily     PRN Meds:sodium chloride, sodium chloride, acetaminophen, dextrose 50%, dextrose 50%, glucagon (human recombinant), glucose, glucose, insulin aspart, omnipaque  Continuous Infusions:   sodium chloride 0.9% 75 mL/hr at 05/30/17 0828    pantoprazole 40 mg in dextrose 5 % 100 mL infusion (ready to  mix system) 8 mg/hr (05/30/17 0742)       Physical Exam:     Vitals:  Temp:  [97.7 °F (36.5 °C)-98.1 °F (36.7 °C)]   Pulse:  [59-77]   Resp:  [10-26]   BP: (110-175)/(49-89)   SpO2:  [95 %-100 %]        HEENT:   Sclera anicteric, Uvula midline, EOMI, OP clear  Neck:               No JVD, moves to all direction without any limitations  CV:               RRR, no murmurs / rubs / gallops, normal S1/S2  Pulm:               CTAB, no wheezes, rales, or ronchi  GI:               Abdomen soft, NTND, +BS  Extremities:              No LE edema, warm with palpable pulses    Labs:     Recent Results (from the past 336 hour(s))   CBC auto differential    Collection Time: 05/30/17  6:34 AM   Result Value Ref Range    WBC 5.65 3.90 - 12.70 K/uL    Hemoglobin 9.9 (L) 14.0 - 18.0 g/dL    Hematocrit 28.8 (L) 40.0 - 54.0 %    Platelets 137 (L) 150 - 350 K/uL   CBC auto differential    Collection Time: 05/29/17  1:08 AM   Result Value Ref Range    WBC 7.08 3.90 - 12.70 K/uL    Hemoglobin 9.6 (L) 14.0 - 18.0 g/dL    Hematocrit 28.2 (L) 40.0 - 54.0 %    Platelets 118 (L) 150 - 350 K/uL   CBC auto differential    Collection Time: 05/28/17  3:34 AM   Result Value Ref Range    WBC 8.96 3.90 - 12.70 K/uL    Hemoglobin 6.7 (L) 14.0 - 18.0 g/dL    Hematocrit 19.6 (LL) 40.0 - 54.0 %    Platelets 143 (L) 150 - 350 K/uL       Recent Results (from the past 336 hour(s))   Basic metabolic panel    Collection Time: 05/17/17  5:27 AM   Result Value Ref Range    Sodium 137 136 - 145 mmol/L    Potassium 3.5 3.5 - 5.1 mmol/L    Chloride 102 95 - 110 mmol/L    CO2 24 23 - 29 mmol/L    BUN, Bld 21 (H) 6 - 20 mg/dL    Creatinine 1.0 0.5 - 1.4 mg/dL    Calcium 8.2 (L) 8.7 - 10.5 mg/dL    Anion Gap 11 8 - 16 mmol/L       Estimated Creatinine Clearance: 115.6 mL/min (based on Cr of 0.9).    Imaging:  MRI 5/29:   Small to punctate-sized scattered foci of signal abnormality cerebral hemispheres, left cerebellum and brainstem similarly seen on the prior allowing  for motion limitation.  This is superimposed on confluent regions of T2 flair signal abnormality supratentorial white matter and jose in light of corpus callosal involvement most suggestive for areas of recent or active demyelination superimposed on prior demyelination.  However alternative differential including areas of infarction with remote lacunar-type infarcts and age advanced chronic microvascular ischemic changes not excluded.    There is a small focus enhancement in the left dorsal jose differential to include but not limited to active demyelination versus subacute infarction.    Allowing for motion compared to prior there is no significant interval change.  No hydrocephalus or midline shift.    Clinical correlation and follow evaluation with additional imaging with alternative sedation methods to be considered in light of persistent motion limitation.      Assessment & Plan:     PLAN:  Pt with recent GI bleed and currently on protonix drip.  Received 2 units PRBCs yesterday and had EGD as detailed above.      Would recommend obtaining TTE today to evaluate valves.  Pt has had negative blood cultures and is afebrile making endocarditis less likely.  At this time GIOVANNI is higher risk procedure given recent GI bleed and known multiple gastric antrum ulcers.    Can reevaluate for GIOVANNI after TTE is obtained.    Discussed with Dr. Brooks.    Attending addendum is to follow.    Signed:  Michelle Daley MD, MPH  Cardiology Fellow, PGY-V  Pager: [phone]  5/30/2017 8:34 AM

## 2017-05-30 NOTE — ASSESSMENT & PLAN NOTE
As seen on numerous MRIs of the brain.   S/p 5.5g solumedrol last admission, followed by prednisone taper as an outpatient    DDx includes MS, Susac's syndrome, vasculitis.  Less likely stroke.  CSF 5/14 negative for oligoclonal bands.  Normal IgG index.  WBC 2.  Protein 93 (slightly more elevated than would be expected for a demyelinating process, but not thought to represent meningitis/encephalitis; no antibiotics or antivirals started.  Consider vasculitis.)  MRA Head and Neck negative for any significant stenosis or occlusion

## 2017-05-30 NOTE — PROGRESS NOTES
[facility]  Neurocritical Care  Progress Note    Admit Date: 5/23/2017  Service Date: 05/30/2017  Length of Stay: 7    Subjective:     Chief Complaint: Encephalopathy    History of Present Illness: Patient is a 58 y/o male with poorly controlled Type 2 diabetes on insulin, HTN, mixed HLP and recently diagnosed with multiple sclerosis here at [facility] 2 weeks ago and [place] [facility] [place] after receiving 5 days of high dose IV Solumedrol. Patient was discharged on Medrol dospepak. Patient according to Dr. Cho states double vision was improving and getting better until this 5/24 when had a severe episode of double vision and dizziness and blacked out at home. Patient came around but was complaining of severe double vision and wife noted patient also confused after coming around so went to [facility]. Patient fell on the way to the bathroom. He does not remember falling, nor having any prodrome. Wife states she heard the thump and found him sitting on the floor, staring blankly into space, and confused.      At [place], patient noted to have WBC 19, but is on steroids. Blood culture sent and CXR done that was unremarkable. CT imaging of head unremarkable for any new findings. Patient noted to have  and BUN/Cr of 40/1.1 so Dr. Cho felt patient may just be dehydrated so patient given 1 liter of NS in ER. Patient feeling better. He spoke with Neurology here at [facility] who recommended admit for evaluation due to worsening double vision but Dr. Cho thinks likely just related to dehydration as patient has been having high blood sugars at home and during recent hospitalization.      Pt admitted to hospital medicine 5/24.          Hospital Course: 5/24: admitted to hospital medicine for MS flare/worsening double vision  5/28: -- waxing and waning of mental status throughout the day. MRI findings concerning for ischemic event vs  active demyelination, with ADC correlation of DWI abnormality. MRI shows lesions suggestive of infarct in the R frontal and temporal lobes as well as the left cerebellar hemisphere and left brainstem, correlating with multiple vascular territories. .  5/29 EGD: Large duodenal ulcer & Multiple small clean based gastric ulcers treated with bipolar cautery.  5/30 Step Down to IM with Neurology following for MS        Interval History:    -Exam improving  -Transfer to IM with Neurology Following for MS  -Protonix gtt decreased to BID per GI recs      Review of Systems   Respiratory: Negative for apnea, cough, choking, chest tightness, shortness of breath, wheezing and stridor.    Cardiovascular: Negative for chest pain, palpitations and leg swelling.   Gastrointestinal: Negative for abdominal distention, constipation and vomiting.   Skin: Negative for color change and pallor.       Objective:     Vitals:  Temp: 98 °F (36.7 °C) (05/30/17 1100)  Pulse: 72 (05/30/17 1300)  Resp: 13 (05/30/17 1300)  BP: (!) 140/71 (05/30/17 1300)  SpO2: 98 % (05/30/17 1300)    Temp:  [97.7 °F (36.5 °C)-98.1 °F (36.7 °C)] 98 °F (36.7 °C)  Pulse:  [59-77] 72  Resp:  [10-26] 13  SpO2:  [94 %-100 %] 98 %  BP: (110-175)/() 140/71              05/29 0701 - 05/30 0700  In: 2685 [I.V.:2435]  Out: 1800 [Urine:1800]    Physical Exam   Eyes: EOM are normal. Pupils are equal, round, and reactive to light.   Cardiovascular: Normal rate, regular rhythm, normal heart sounds and intact distal pulses.  Exam reveals no gallop and no friction rub.    No murmur heard.  Pulmonary/Chest: Effort normal and breath sounds normal. No respiratory distress. He has no wheezes. He has no rales. He exhibits no tenderness.   Abdominal: Soft. Bowel sounds are normal. He exhibits no distension. There is no tenderness.   Skin: Skin is warm and dry. Capillary refill takes less than 2 seconds. There is erythema.            NEUROLOGICAL EXAMINATION:     MENTAL STATUS         E4 V5 M6  AAO x person, place, year.   Speech clear/fluent  PERRLA, EOMI  RAMIREZ, Follows commands  LUE 4/5  RUE 5/5  LLE 5/5  RLE 5/5       CRANIAL NERVES     CN III, IV, VI   Pupils are equal, round, and reactive to light.  Extraocular motions are normal.         Jarod Coma Scale    Medications:  Continuous  sodium chloride 0.9% Last Rate: 75 mL/hr at 05/30/17 1300   Scheduled  atorvastatin 40 mg Daily   cefTRIAXone (ROCEPHIN) IVPB 1 g Q24H   diltiaZEM 60 mg Q8H   insulin aspart 18 Units TIDWM   insulin detemir 35 Units QHS   levetiracetam IVPB 500 mg Q12H   pantoprazole 40 mg BID   sodium chloride 0.9% 1,000 mL Once   vitamin D 5,000 Units Daily   PRN  sodium chloride  Q24H PRN   sodium chloride  Q24H PRN   acetaminophen 650 mg Q6H PRN   dextrose 50% 12.5 g PRN   dextrose 50% 25 g PRN   glucagon (human recombinant) 1 mg PRN   glucose 16 g PRN   glucose 24 g PRN   insulin aspart 0-5 Units QID (AC + HS) PRN   omnipaque 15 mL PRN     Today I personally reviewed pertinent medications, lines/drains/airways, imaging, cardiology, lab results, microbiology results,     Assessment/Plan:     Neuro   Acute encephalopathy    -Vascular Neurology   - Neurology following for MS  -24 hr EEG completed- non-focal slowing- indicative of encephalopathy   -keppra 500 BID  -MRI findings concerning for ischemic event vs active demyelination  -MRI shows lesions suggestive of infarct in the R frontal and temporal lobes as well as the left cerebellar hemisphere and left brainstem, correlating with multiple vascular territories.   -GIOVANNI recommended r/o cardioembolic phenomenon and Cardiogy consulted. Cardiology feels TTE more appropriate given no leukocytosis, afebrile, Cx NGTD and increased risk with recent GI bleed  -work up for auto-immune causes of clinical presentation.   -ASA/DVT ppx held with GI bleed  -PT OT SLP        Demyelinating changes in brain    -As seen on MRIs from 5/13, no spinal cord lesions  -S/p 5.5g solumedrol last  admission  -Discussed with MS team - will need outpatient f/u appointment        Internuclear ophthalmoplegia of left eye    -Improving  -Received 5.5g solumedrol last admission  -May be 2/2 MS (suspected, but not confirmed)        Cardiac   Essential hypertension    -SBP<180  -Dilt resumed,   -Atenolol/diovan held, -150  -Ef 60-65        Hem/Onc   Leukocytosis    -resolved  -likely 2/2 steroids  -Cx NGTD          Endocrine   Type 2 diabetes mellitus with hyperglycemia, with long-term current use of insulin    -SSI  -Detemir 35,   -Asparte 18units preprandial  -A1C 11.4        Fluids/Electrolytes/Nutrition/GI   Upper GI bleed    -large melanotic stool prior to transfer to [facility]  -H 7.4, transfuse 2 U PRBC on 5/28  -holding antiplatelets and DVT proph secondary to bleed  -possibly 2/2 recent steroids  -EGD 5/29, multiple ulcers cauterized   GI recs: PPI IV BID x 72 hrs then transition to PPI BID PO for 8 weeks   Repeat EGD in 8 weeks to assess healing   Check H pylori serology and treat if positive.   Start Reg. diet            Obesity    -BMI 36.4  -Rec Lifestyle change        Mixed hyperlipidemia    -continue daily statin         Other   Acute blood loss anemia    -2/2 UGI bleed  -Hgb 9.9, HDS            Prophylaxis:  Venous Thromboembolism: mechanical  Stress Ulcer: PPI  Ventilator Pneumonia: not applicable     Activity Orders          Up with assistance starting at 05/28 1711    Up with assistance starting at 05/28 1635    Straight Cath starting at 05/27 0813        Full Code   Level 2    William French NP  Neurocritical Care  [facility]

## 2017-05-30 NOTE — PROGRESS NOTES
[facility]  Neurology  Progress Note    Patient Name: Bessy Nunez  MRN: [id number]  Admission Date: 5/23/2017  Hospital Length of Stay: 7 days  Code Status: Full Code   Attending Provider: Davey Tom MD  Primary Care Physician: Edgar Nova MD   Principal Problem:Encephalopathy      Subjective:     Interval History: Patient continues to wax and wane. Continued blurred vision.  No new  complaints today. Denies HA.    Current Neurological Medications: Keppra     Current Facility-Administered Medications   Medication Dose Route Frequency Provider Last Rate Last Dose    0.9%  NaCl infusion (for blood administration)   Intravenous Q24H PRN Guanaco Story MD        0.9%  NaCl infusion (for blood administration)   Intravenous Q24H PRN Sandra Reyes PA-C        0.9%  NaCl infusion   Intravenous Continuous Elvira Hendricks MD 75 mL/hr at 05/30/17 1300      acetaminophen tablet 650 mg  650 mg Oral Q6H PRN Gilberto Díaz MD   650 mg at 05/28/17 1243    atorvastatin tablet 40 mg  40 mg Oral Daily Donna Jason MD   Stopped at 05/30/17 0900    cefTRIAXone (ROCEPHIN) 1 g in dextrose 5 % 50 mL IVPB  1 g Intravenous Q24H Josefina Urena MD   1 g at 05/29/17 2124    dextrose 50% injection 12.5 g  12.5 g Intravenous PRN Donna Jason MD   12.5 g at 05/28/17 0936    dextrose 50% injection 25 g  25 g Intravenous PRN Donna Jason MD        diltiaZEM tablet 60 mg  60 mg Oral Q8H Guanaco Story MD   60 mg at 05/30/17 0600    glucagon (human recombinant) injection 1 mg  1 mg Intramuscular PRN Donna Jason MD        glucose chewable tablet 16 g  16 g Oral PRN Donna Jason MD        glucose chewable tablet 24 g  24 g Oral PRN Donna Jason MD        insulin aspart pen 0-5 Units  0-5 Units Subcutaneous QID (AC + HS) PRN Donna Jason MD   3 Units at 05/29/17 1133    insulin aspart  pen 18 Units  18 Units Subcutaneous TIDWM Josefina Urena MD   Stopped at 05/30/17 1130    insulin detemir pen 35 Units  35 Units Subcutaneous QHS Guanaco Story MD   17 Units at 05/29/17 2125    levetiracetam in NaCl (iso-os) IVPB 500 mg  500 mg Intravenous Q12H Guanaco Story  mL/hr at 05/30/17 0827 500 mg at 05/30/17 0827    omnipaque oral solution 15 mL  15 mL Oral PRN Horace Hernandez MD        pantoprazole injection 40 mg  40 mg Intravenous BID William French NP   40 mg at 05/30/17 1042    sodium chloride 0.9% bolus 1,000 mL  1,000 mL Intravenous Once Guanaco Story MD        vitamin D 1000 units tablet 5,000 Units  5,000 Units Oral Daily Guanaco Story MD   Stopped at 05/30/17 0900       Review of Systems   Constitutional: Positive for activity change.   Eyes: Positive for visual disturbance.   Respiratory: Negative for shortness of breath.    Cardiovascular: Negative for chest pain.   Gastrointestinal: Negative for nausea and vomiting.   Skin: Negative for rash and wound.   Neurological:        Periods of decreased responsiveness    Psychiatric/Behavioral: Positive for confusion and decreased concentration. Negative for agitation and behavioral problems.     Objective:     Vital Signs (Most Recent):  Temp: 98 °F (36.7 °C) (05/30/17 1100)  Pulse: 72 (05/30/17 1300)  Resp: 13 (05/30/17 1300)  BP: (!) 140/71 (05/30/17 1300)  SpO2: 98 % (05/30/17 1300) Vital Signs (24h Range):  Temp:  [97.7 °F (36.5 °C)-98.1 °F (36.7 °C)] 98 °F (36.7 °C)  Pulse:  [59-77] 72  Resp:  [10-26] 13  SpO2:  [94 %-100 %] 98 %  BP: (110-175)/() 140/71     Weight: 118.4 kg (261 lb 0.4 oz)  Body mass index is 36.41 kg/m².    Physical Exam   Constitutional: He appears well-developed and well-nourished. No distress.   HENT:   Head: Normocephalic and atraumatic.   Pulmonary/Chest: Effort normal.   Neurological: He is alert. He has a normal Finger-Nose-Finger Test.   Reflex Scores:        Bicep reflexes are 1+ on the right side and 1+ on the left side.       Brachioradialis reflexes are 1+ on the right side and 1+ on the left side.       Patellar reflexes are 1+ on the right side and 1+ on the left side.  Skin: Skin is warm and dry. He is not diaphoretic.   Psychiatric: His speech is normal.   Slightly withdrawn.  Less expressive than last admission.  Paucity of speech.   Nursing note and vitals reviewed.      NEUROLOGICAL EXAMINATION:     MENTAL STATUS   Attention: decreased. Concentration: normal.   Speech: speech is normal   Level of consciousness: alert       Oriented to name but not age.  Oriented to hospital, but thinks he is in [place].  Not sure what floor we are on.  Knows it's May 2017, but does not know date or day of the week.    Able to spell WORLD forwards but not backwards.  Cannot subtract 7 from 100.     CRANIAL NERVES     CN II   Visual fields full to confrontation.     CN V   Facial sensation intact.     CN VII   Facial expression full, symmetric.     CN VIII   Hearing: intact    CN XI   Right sternocleidomastoid strength: normal  Left sternocleidomastoid strength: normal    CN XII   Tongue: not atrophic  Fasciculations: absent  Tongue deviation: none       Mild L JAYDEN.  Improving.     MOTOR EXAM   Muscle bulk: normal  Overall muscle tone: normal    Strength   Right biceps: 5/5  Left biceps: 5/5  Right triceps: 5/5  Left triceps: 5/5  Right quadriceps: 5/5  Left quadriceps: 4/5       Weaker on LLE than RLE today.       REFLEXES     Reflexes   Right brachioradialis: 1+  Left brachioradialis: 1+  Right biceps: 1+  Left biceps: 1+  Right patellar: 1+  Left patellar: 1+    SENSORY EXAM   Light touch normal.     GAIT AND COORDINATION      Coordination   Finger to nose coordination: normal      Significant Labs:   Hemoglobin A1c:     Recent Labs  Lab 05/13/17  0138   HGBA1C 11.4*     Blood Culture:     Recent Labs  Lab 05/28/17  1601   LABBLOO No Growth to date  No Growth to date  No  Growth to date  No Growth to date     BMP:     Recent Labs  Lab 05/29/17  0108 05/30/17  0535    98    142   K 3.5 3.7   * 109   CO2 21* 22*   BUN 7 5*   CREATININE 0.8 0.9   CALCIUM 8.3* 8.8   MG 1.6 1.7     CBC:     Recent Labs  Lab 05/28/17  1350 05/29/17  0108 05/29/17  1133 05/29/17  2131 05/30/17  0634   WBC 8.70 7.08  7.08  7.08  --   --  5.65   HGB 7.4* 9.6*  9.6*  9.6* 9.1* 9.4* 9.9*   HCT 21.7* 28.2*  28.2*  28.2*  --   --  28.8*   * 118*  118*  118*  --   --  137*     CMP:     Recent Labs  Lab 05/29/17  0108 05/30/17  0535    98    142   K 3.5 3.7   * 109   CO2 21* 22*   BUN 7 5*   CREATININE 0.8 0.9   CALCIUM 8.3* 8.8   MG 1.6 1.7   PROT 5.1* 5.8*   ALBUMIN 2.5* 2.7*   BILITOT 1.0 0.7   ALKPHOS 81 90   AST 35 29   ALT 41 36   ANIONGAP 8 11   EGFRNONAA >60.0 >60.0     CSF Culture: No results for input(s): CSFCULTURE in the last 48 hours.  CSF Studies: No results for input(s): ALIQUT, APPEARCSF, COLORCSF, CSFWBC, CSFRBC, GLUCCSF, LDHCSF, PROTEINCSF, VDRLCSF in the last 48 hours.  Inflammatory Markers: No results for input(s): SEDRATE, CRP, PROCAL in the last 48 hours.  POCT Glucose:     Recent Labs  Lab 05/29/17  1720 05/30/17  0749 05/30/17  1049   POCTGLUCOSE 111* 106 91     Respiratory Culture: No results for input(s): GSRESP, RESPIRATORYC in the last 48 hours.  Urine Culture: No results for input(s): LABURIN in the last 48 hours.  Urine Studies: No results for input(s): COLORU, APPEARANCEUA, PHUR, SPECGRAV, PROTEINUA, GLUCUA, KETONESU, BILIRUBINUA, OCCULTUA, NITRITE, UROBILINOGEN, LEUKOCYTESUR, RBCUA, WBCUA, BACTERIA, SQUAMEPITHEL, HYALINECASTS in the last 48 hours.    Invalid input(s): GUS  Recent Lab Results       05/30/17  1049 05/30/17  0830 05/30/17  0749 05/30/17  0634 05/30/17  0535      Albumin     2.7(L)     Alkaline Phosphatase     90     ALT     36     Anion Gap     11     AST     29     Baso #    0.00      Basophil%    0.0       Total Bilirubin     0.7  Comment:  For infants and newborns, interpretation of results should be based  on gestational age, weight and in agreement with clinical  observations.  Premature Infant recommended reference ranges:  Up to 24 hours.............<8.0 mg/dL  Up to 48 hours............<12.0 mg/dL  3-5 days..................<15.0 mg/dL  6-29 days.................<15.0 mg/dL       BUN, Bld     5(L)     Calcium     8.8     Chloride     109     CO2     22(L)     Creatinine     0.9     Diastolic Dysfunction  No        Differential Method    Automated      EF  60        eGFR if      >60.0     eGFR if non      >60.0  Comment:  Calculation used to obtain the estimated glomerular filtration  rate (eGFR) is the CKD-EPI equation. Since race is unknown   in our information system, the eGFR values for   -American and Non--American patients are given   for each creatinine result.       Eos #    0.1      Eosinophil%    0.9      Glucose     98     Gran #    3.7      Gran%    65.1      Hematocrit    28.8(L)      Hemoglobin    9.9(L)      Lymph #    1.3      Lymph%    23.0      Magnesium     1.7     MCH    29.4      MCHC    34.4      MCV    86      Mono #    0.6      Mono%    10.6      MPV    8.4(L)      Phosphorus     3.9     Platelets    137(L)      POCT Glucose 91  106       Potassium     3.7     Total Protein     5.8(L)     RBC    3.37(L)      RDW    14.5      Sodium     142     WBC    5.65                  05/29/17  2131 05/29/17  1720      Albumin       Alkaline Phosphatase       ALT       Anion Gap       AST       Baso #       Basophil%       Total Bilirubin       BUN, Bld       Calcium       Chloride       CO2       Creatinine       Diastolic Dysfunction       Differential Method       EF       eGFR if        eGFR if non        Eos #       Eosinophil%       Glucose       Gran #       Gran%       Hematocrit       Hemoglobin 9.4(L)      Lymph #        Lymph%       Magnesium       MCH       MCHC       MCV       Mono #       Mono%       MPV       Phosphorus       Platelets       POCT Glucose  111(H)     Potassium       Total Protein       RBC       RDW       Sodium       WBC             Significant Imaging:  MRI brain 5/13/17:  Multiple foci of FLAIR hyperintensity in the deep cerebral periventricular white matter in a configuration and distribution which can be seen in the setting of underlying demyelinating process such as multiple sclerosis. There are several punctate foci of restricted diffusion including the left aspect of the midbrain tectum which in light of the aforementioned white matter changes may reflect regions of recent demyelination. Superimposed small acute infarcts would be difficult to exclude, although are felt less likely given the overall constellation of findings. Additionally, there is a 0.7 cm enhancing T2/FLAIR identified in the anterior right thalamus concerning for focus of active demyelination.           Also old lesions in corpus collosum         CTH 5/26/17  No detrimental change in this patient with volume loss, and multiple areas of hyperattenuation consistent with a infarcts, and periventricular hypoattenuation likely related to the patient's demyelinating disease.    MRI Brain 5/26/17  Please note evaluation is markedly limited by motion artifact, and the patient could not complete the exam.  However, the limited diffusion MRI findings are most compatible with acute infarction involving the right frontal and right temporal lobes as well as the left cerebellar hemisphere and left brainstem, as above.  No evidence of intracranial hemorrhage.    MRI Brain 5/28/17   Small to punctate-sized scattered foci of signal abnormality cerebral hemispheres, left cerebellum and brainstem similarly seen on the prior allowing for motion limitation.  This is superimposed on confluent regions of T2 flair signal abnormality supratentorial white  matter and jose in light of corpus callosal involvement most suggestive for areas of recent or active demyelination superimposed on prior demyelination.  However alternative differential including areas of infarction with remote lacunar-type infarcts and age advanced chronic microvascular ischemic changes not excluded.    There is a small focus enhancement in the left dorsal jose differential to include but not limited to active demyelination versus subacute infarction.    Allowing for motion compared to prior there is no significant interval change.  No hydrocephalus or midline shift.        Assessment and Plan:     Fall    Suspected seizure  Continue keppra 500mg bid  EEG negative for seizure; generalized slowing        Diplopia    Improving        Leukocytosis    Resolved.  Likely 2/2 steroids        Demyelinating changes in brain    As seen on numerous MRIs of the brain.   S/p 5.5g solumedrol last admission, followed by prednisone taper as an outpatient    DDx includes MS, Susac's syndrome, vasculitis.  Less likely stroke.  CSF 5/14 negative for oligoclonal bands.  Normal IgG index.  WBC 2.  Protein 93 (slightly more elevated than would be expected for a demyelinating process, but not thought to represent meningitis/encephalitis; no antibiotics or antivirals started.  Consider vasculitis.)  MRA Head and Neck negative for any significant stenosis or occlusion    Consider CTA H/N or conventional angiogram for additional w/u of potential vasculitis.        Internuclear ophthalmoplegia of left eye    Improving  Received 5.5g solumedrol last admission    Patient continues to complain of blurred vision  Concern for vasculitis    Plan for 1pm appointment with Dr. Mixon (ophthalmology - retina) in the ophthal clinic for eval for possible vasculitis (Susac's syndrome?), per Dr. Love's recommendations.  Appreciate ophthal's assistance.        Depressive disorder, not elsewhere classified    Sertraline 100mg  discontinued this admission - consider restarting / uptitrating / changing to a different antidepressant for better symptom control.    Abulia?  Numerous frontal lesions.        * Encephalopathy    Patient demonstrates waxing and waning of mental status throughout the day, with numerous additional lesions seen on MRI suggestive of demyelination>>stroke    F/u  anti-Phospholipid ab/cardiolipin ab  Continue empiric keppra 500mg bid for now, given hx  GIOVANNI, as above    Lyme titer (neg), WNV (neg), SSA (neg) , SSB (neg), dsDNA (neg), SS-A (neg), SS-B (neg), ESR 15, CRP 5.37, HIV neg, REYMUNDO (neg)     F/u autoimmune encephalopathy panel    24h EEG 5/26 negative for simple partial seizures / underlying etiology of waxing and waning MS            VTE Risk Mitigation         Ordered     Place sequential compression device  Until discontinued      05/24/17 0303     Medium Risk of VTE  Once      05/24/17 0303          Sharon Luong MD  Neurology  [facility]

## 2017-05-30 NOTE — PLAN OF CARE
TRANSITIONS OF CARE NOTE      TRANSFER TO:  IM    DIAGNOSIS:  Multiple sclerosis [G35]  Multiple sclerosis [G35]  Acute blood loss anemia [D62]  Multiple sclerosis [G35]  CVA (cerebral vascular accident) [I63.9]  HTN (hypertension) [I10]  Syncope [R55]  Multiple sclerosis [G35]  SBE (subacute bacterial endocarditis) [I33.0]  CVA (cerebral vascular accident) [I63.9]       FAMILY STATUS:    Who are your caregiver(s) and their phone number(s)? (wife Kelly [phone])     Prior to hospitalization functional status: Independent         NEAREST RELATIVE AND CONTACT #:  Extended Emergency Contact Information  Primary Emergency Contact: Kelly Nunez  Address: [address] [place] [address]  Home Phone: [phone]  Mobile Phone: [phone]  Relation: Spouse    PT/OT/ST NEEDS:  PT/OT has not re-evaluated patient since ICU admit/.  Prior OT recs were SNF vs HHOT      DISCHARGE PLAN:  SNF vs HHOT pending therapy re-evaluations and recs    INSURANCE:  Payor: MEDICAID / Plan: [employer] [facility] / Product Type: Managed Medicaid /         PCP:  Edgar Nova MD    PHARMACY:    [facility] [address] [place], [place] [address] [facility] [address] [address]  [facility] [address]  [facility] [address]  Phone: [phone] Fax: [phone]        Lois Christina RN, CCRN-K, [facility]  Neuro-Critical Care   X [phone]

## 2017-05-30 NOTE — ASSESSMENT & PLAN NOTE
Sertraline 100mg discontinued this admission - consider restarting / uptitrating / changing to a different antidepressant for better symptom control.    Abulia?  Numerous frontal lesions.

## 2017-05-30 NOTE — PT/OT/SLP PROGRESS
Physical Therapy      Bessy Nunez  MRN: [id number]    Patient not seen today secondary to Other (Comment). Pt undergoing echo scan and PT unable to return in day for re-evaluation. Will follow up on next scheduled visit to complete. Please place OT orders for re-consult as well.      Alexa Smith, PT, DPT  5/30/2017

## 2017-05-30 NOTE — PLAN OF CARE
Problem: SLP Goal  Goal: SLP Goal  Goals to be met 6/2  1.Pt will tolerate soft diet and thin liquids  2 pt will participate in speech lang eval if ordered by MD    Swallow eval and speech/lang/cognitive eval completed. Pt safe to begin regular diet and thin liquids when he is awake/alert.    JULIO Pena, CCC-SLP  5/30/2017

## 2017-05-30 NOTE — ASSESSMENT & PLAN NOTE
-Vascular Neurology   - Neurology following for MS  -24 hr EEG completed- non-focal slowing- indicative of encephalopathy   -keppra 500 BID  -MRI findings concerning for ischemic event vs active demyelination  -MRI shows lesions suggestive of infarct in the R frontal and temporal lobes as well as the left cerebellar hemisphere and left brainstem, correlating with multiple vascular territories.   -GIOVANNI recommended r/o cardioembolic phenomenon and Cardiogy consulted. Cardiology feels TTE more appropriate given no leukocytosis, afebrile, Cx NGTD and increased risk with recent GI bleed  -work up for auto-immune causes of clinical presentation.   -PT OT SLP

## 2017-05-30 NOTE — ASSESSMENT & PLAN NOTE
-large melanotic stool prior to transfer to [facility]  -H 7.4, transfuse 2 U PRBC on 5/28  -holding antiplatelets and DVT proph secondary to bleed  -possibly 2/2 recent steroids  --continue protonix ggt  -NPO  -continue to monitor Hb    --egd today, duodenal ulcers- cauterized.

## 2017-05-30 NOTE — RESIDENT HANDOFF
Neuro Critical Care Transfer of Care note    Date of Admit: 5/23/2017  Date of Transfer / Stepdown: 5/30/2017    Brief History of Present Illness:      Patient is a 56 y/o male with poorly controlled Type 2 diabetes on insulin, HTN, mixed HLP and recently diagnosed with multiple sclerosis here at [facility] 2 weeks ago and discharged last week from [facility] from Hospital Medicine Team 3 after receiving 5 days of high dose IV Solumedrol. Patient was discharged on Medrol dospepak. Patient according to Dr. Cho states double vision was improving and getting better until this 5/24 when had a severe episode of double vision and dizziness and blacked out at home. Patient came around but was complaining of severe double vision and wife noted patient also confused after coming around so went to [facility]. Patient fell on the way to the bathroom. He does not remember falling, nor having any prodrome. Wife states she heard the thump and found him sitting on the floor, staring blankly into space, and confused.      At [place], patient noted to have WBC 19, but is on steroids. Blood culture sent and CXR done that was unremarkable. CT imaging of head unremarkable for any new findings. Patient noted to have  and BUN/Cr of 40/1.1 so Dr. Cho felt patient may just be dehydrated so patient given 1 liter of NS in ER. Patient feeling better. He spoke with Neurology here at [facility] who recommended admit for evaluation due to worsening double vision but Dr. Cho thinks likely just related to dehydration as patient has been having high blood sugars at home and during recent hospitalization.           Hospital Course: 5/24: admitted to hospital medicine for MS flare/worsening double vision  5/28:Transfered to [facility],  D/t waxing and waning of mental status throughout the day. MRI findings concerning for ischemic event vs active demyelination, with ADC correlation of DWI abnormality. MRI shows lesions  suggestive of infarct in the R frontal and temporal lobes as well as the left cerebellar hemisphere and left brainstem, correlating with multiple vascular territories. .  5/29 EGD: Large duodenal ulcer & Multiple small clean based gastric ulcers treated with bipolar cautery.  5/30 Step Down to IM with Neurology following for MS. Weaning protonix, H/H stable    Hospital Course By Problem with Pertinent Findings:     Acute encephalopathy     -Vascular Neurology   - Neurology following for MS  -24 hr EEG completed- non-focal slowing- indicative of encephalopathy   -keppra 500 BID  -MRI findings concerning for ischemic event vs active demyelination  -MRI shows lesions suggestive of infarct in the R frontal and temporal lobes as well as the left cerebellar hemisphere and left brainstem, correlating with multiple vascular territories.   -GIOVANNI recommended r/o cardioembolic phenomenon and Cardiogy consulted. Cardiology feels TTE more appropriate given no leukocytosis, afebrile, Cx NGTD and increased risk with recent GI bleed  -work up for auto-immune causes of clinical presentation.   -PT OT SLP       Demyelinating changes in brain     -As seen on MRIs from 5/13, no spinal cord lesions  -S/p 5.5g solumedrol last admission  -Discussed with MS team - will need outpatient f/u appointment       Internuclear ophthalmoplegia of left eye     -Improving  -Received 5.5g solumedrol last admission  -May be 2/2 MS (suspected, but not confirmed)       Cardiac   Essential hypertension     -SBP<180  -Dilt resumed,   -Atenolol/diovan held, -150  -Ef 60-65       Hem/Onc   Leukocytosis     -resolved  -likely 2/2 steroids  -Cx NGTD          Endocrine   Type 2 diabetes mellitus with hyperglycemia, with long-term current use of insulin     -SSI  -Detemir 35,   -Asparte 18units preprandial  -A1C 11.4       Fluids/Electrolytes/Nutrition/GI   Upper GI bleed     -large melanotic stool prior to transfer to [facility]  -H 7.4, transfuse 2 U PRBC on  5/28  -holding antiplatelets and DVT proph secondary to bleed  -possibly 2/2 recent steroids  -EGD 5/29, multiple ulcers cauterized                        GI recs: PPI IV BID x 72 hrs then transition to PPI BID PO for 8 weeks                        Repeat EGD in 8 weeks to assess healing                        Check H pylori serology and treat if positive.                        Start Reg. diet             Obesity     -BMI 36.4  -Rec Lifestyle change       Mixed hyperlipidemia     -continue daily statin        Other   Acute blood loss anemia     -2/2 UGI bleed  -Hgb 9.9, HDS         Consultants and Procedures:     Consultants:  Neurology  Vascular Neurology    Procedures:        Transfer Information:     Diet:  Reg    Physical Activity:  PT/OT    To Do / Pending Studies / Follow ups:  US JOHNSON r/o DVT  Neurology following, possible opthalmology appointment  H/H with protonix weaning    William French  Neuro Crtical Care

## 2017-05-30 NOTE — SUBJECTIVE & OBJECTIVE
Interval History:    -Exam improving  -Transfer to IM with Neurology Following for MS  -Protonix gtt decreased to BID per GI recs      Review of Systems   Respiratory: Negative for apnea, cough, choking, chest tightness, shortness of breath, wheezing and stridor.    Cardiovascular: Negative for chest pain, palpitations and leg swelling.   Gastrointestinal: Negative for abdominal distention, constipation and vomiting.   Skin: Negative for color change and pallor.       Objective:     Vitals:  Temp: 98 °F (36.7 °C) (05/30/17 1100)  Pulse: 72 (05/30/17 1300)  Resp: 13 (05/30/17 1300)  BP: (!) 140/71 (05/30/17 1300)  SpO2: 98 % (05/30/17 1300)    Temp:  [97.7 °F (36.5 °C)-98.1 °F (36.7 °C)] 98 °F (36.7 °C)  Pulse:  [59-77] 72  Resp:  [10-26] 13  SpO2:  [94 %-100 %] 98 %  BP: (110-175)/() 140/71              05/29 0701 - 05/30 0700  In: 2685 [I.V.:2435]  Out: 1800 [Urine:1800]    Physical Exam   Eyes: EOM are normal. Pupils are equal, round, and reactive to light.   Cardiovascular: Normal rate, regular rhythm, normal heart sounds and intact distal pulses.  Exam reveals no gallop and no friction rub.    No murmur heard.  Pulmonary/Chest: Effort normal and breath sounds normal. No respiratory distress. He has no wheezes. He has no rales. He exhibits no tenderness.   Abdominal: Soft. Bowel sounds are normal. He exhibits no distension. There is no tenderness.   Skin: Skin is warm and dry. Capillary refill takes less than 2 seconds. There is erythema.            NEUROLOGICAL EXAMINATION:     MENTAL STATUS        E4 V5 M6  AAO x person, place, year.   Speech clear/fluent  PERRLA, EOMI  RAMIREZ, Follows commands  LUE 4/5  RUE 5/5  LLE 5/5  RLE 5/5       CRANIAL NERVES     CN III, IV, VI   Pupils are equal, round, and reactive to light.  Extraocular motions are normal.         Jarod Coma Scale    Medications:  Continuous  sodium chloride 0.9% Last Rate: 75 mL/hr at 05/30/17 1300   Scheduled  atorvastatin 40 mg Daily    cefTRIAXone (ROCEPHIN) IVPB 1 g Q24H   diltiaZEM 60 mg Q8H   insulin aspart 18 Units TIDWM   insulin detemir 35 Units QHS   levetiracetam IVPB 500 mg Q12H   pantoprazole 40 mg BID   sodium chloride 0.9% 1,000 mL Once   vitamin D 5,000 Units Daily   PRN  sodium chloride  Q24H PRN   sodium chloride  Q24H PRN   acetaminophen 650 mg Q6H PRN   dextrose 50% 12.5 g PRN   dextrose 50% 25 g PRN   glucagon (human recombinant) 1 mg PRN   glucose 16 g PRN   glucose 24 g PRN   insulin aspart 0-5 Units QID (AC + HS) PRN   omnipaque 15 mL PRN     Today I personally reviewed pertinent medications, lines/drains/airways, imaging, cardiology, lab results, microbiology results,

## 2017-05-30 NOTE — PT/OT/SLP EVAL
"Speech Language Pathology Evaluation    Bessy Nunez   MRN: [id number]   Admitting Diagnosis: Encephalopathy    Diet recommendations: Solid Diet Level: Regular  Liquid Diet Level: Thin Feed only when awake/alert, HOB to 90 degrees and Avoid talking while eating    SLP Treatment Date: 05/30/17  Speech Start Time: 1002     Speech Stop Time: 1025     Speech Total (min): 23 min     Returned to see pt from 10:31-10:39  8 min  TREATMENT BILLABLE MINUTES:  Eval 23  and Eval Swallow and Oral Function 8    Diagnosis: Encephalopathy      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 : Yes  Keep patient NPO?: No   General Precautions: Standard, fall, aspiration, seizure          Social Hx: Patient lives with his wife    Prior diet: regular/thin    Occupational/hobbies/homemaking: retired; one year of college    Subjective:  " Hospital" when asked where he was  Patient goals: to eat    Pain/Comfort  Pain Rating 1: 0/10  Pain Rating Post-Intervention 1: 0/10    Objective:        Oral Musculature Evaluation  Oral Musculature: WFL  Dentition: present and adequate  Mucosal Quality: good  Mandibular Strength and Mobility: WFL  Oral Labial Strength and Mobility: WFL  Lingual Strength and Mobility: WFL  Buccal Strength and Mobility: WFL  Volitional Cough: adequate  Voice Prior to PO Intake: clear     Cognitive Status:  Behavioral Observations: alert and w/ initiation problems-  Memory and Orientation: oriented to person, month and year. He knew hospital " but not the name of the hospital; pt recalled up to 5 digits and 3 words. After a delay, he recalled 0/3 and with cues 1/3  Attention: fair; pt distractable  Problem Solving: responses were accurate only 33% of the time; pt with concrete and incomplete answers and unable to generate multiple solutions; He was able to contrast items with cues but  Not compare them. He completed categorizational task with 75% acc and cues. Pt with variable delays in responding throughout. Pt named only 7 items when 15-20 is wnl  Pragmatics: flat affect and poor eye contact  Executive Function: insight/awareness and mental flexibility    Language: yes    Auditory Comprehension: pt responded to complex questions with 85% acc and followed one and two step commands.    Verbal Expression: Pt able to express his basic wants/needs. He named 3/6 pictured objets and with cues 100% acc    Motor Speech: no dysarthria    Voice: wfl    Augmentative Alternative Communication: no          Bedside Swallow Eval:  Consistencies Assessed: Thin liquids 1 1/2 cups via straw and Solids 2 crackers  Oral Phase: WFL  Pharyngeal Phase: no overt clinical signs/symptoms of pharyngeal dysphagia    Additional Treatment:    Education provided to pt, son and wife. Reviewed s/s of aspiration and swallowing precautions. Discussed feeding pt only when fully awake/alet. Discussed results of cognitive eval with son. Son reported pt doing much better today but definitely not at his cognitive baseline.       FIM:  Social Interaction: 3 Moderate Direction--The patient interacts appropriately 50 to 74% of the time.   Problem Solving: 3 Moderate Direction--The patient solves routine problems 50 to 74% of the time.   Comprehension: 4 Minimal Prompting--The patient understands directions and conversation about basid daily needs 75 to 90% of the time.   Expression: 4 Minimal Prompting--The patient expresses basic daily needs and ideas 75 to 90% of the time.   Memory: 3 Moderate  Prompting--The patient recognizes and remembers 50 to 74% of the time.     Assessment:  Bessy Nunez is a 57 y.o. male with a SLP diagnosis of Cognitive-Linguistic Impairment. He present with no deficits in swallowing.        Discharge recommendations: Discharge Facility/Level Of Care Needs: other (see comments) (tbd)     Goals:    SLP Goals        Problem: SLP Goal    Goal Priority Disciplines Outcome   SLP Goal     SLP    Description:  Goals to be met 6/6  1.Pt will tolerate regulart diet and thin liquids  2 pt will participate in eval of reading/writing and visual spatial deficits to determine further goals  3 Pt will be ox4 using any  Modality with min a to improve orientation  4 Pt will follow 3 step commands with 70% acc and mod a to improve receptive lang  5 Pt will complete mental manipulation task with 60% acc and mod a to improve cognition                     Plan:   Patient to be seen Therapy Frequency: 5 x/week   Plan of Care expires:    Plan of Care reviewed with: patient, spouse, son  SLP Follow-up?: Yes              JULIO Pena, CCC-SLP  05/30/2017

## 2017-05-30 NOTE — PLAN OF CARE
Problem: Patient Care Overview  Goal: Plan of Care Review  Outcome: Ongoing (interventions implemented as appropriate)  No acute events throughout the day, VS and assessment per flow sheet, patient progressing towards goal as tolerated. Plan of care reviewed with Bessy Nunez and family, all concerns addressed. Will continue to monitor.     Pt is currently without ICU needs and is awaiting on a floor bed.

## 2017-05-31 LAB
ALBUMIN SERPL BCP-MCNC: 2.4 G/DL
ALP SERPL-CCNC: 86 U/L
ALT SERPL W/O P-5'-P-CCNC: 26 U/L
ANION GAP SERPL CALC-SCNC: 8 MMOL/L
AST SERPL-CCNC: 22 U/L
BACTERIA BLD CULT: NORMAL
BACTERIA BLD CULT: NORMAL
BASOPHILS # BLD AUTO: 0.01 K/UL
BASOPHILS NFR BLD: 0.2 %
BILIRUB SERPL-MCNC: 0.5 MG/DL
BUN SERPL-MCNC: 10 MG/DL
CALCIUM SERPL-MCNC: 8.3 MG/DL
CHLORIDE SERPL-SCNC: 112 MMOL/L
CO2 SERPL-SCNC: 20 MMOL/L
CREAT SERPL-MCNC: 1 MG/DL
DIFFERENTIAL METHOD: ABNORMAL
EOSINOPHIL # BLD AUTO: 0.1 K/UL
EOSINOPHIL NFR BLD: 1.1 %
ERYTHROCYTE [DISTWIDTH] IN BLOOD BY AUTOMATED COUNT: 15 %
EST. GFR  (AFRICAN AMERICAN): >60 ML/MIN/1.73 M^2
EST. GFR  (NON AFRICAN AMERICAN): >60 ML/MIN/1.73 M^2
GLUCOSE SERPL-MCNC: 231 MG/DL
HCT VFR BLD AUTO: 28.2 %
HGB BLD-MCNC: 9.7 G/DL
LYMPHOCYTES # BLD AUTO: 1.3 K/UL
LYMPHOCYTES NFR BLD: 24.7 %
MAGNESIUM SERPL-MCNC: 1.8 MG/DL
MCH RBC QN AUTO: 29.7 PG
MCHC RBC AUTO-ENTMCNC: 34.4 %
MCV RBC AUTO: 86 FL
MONOCYTES # BLD AUTO: 0.5 K/UL
MONOCYTES NFR BLD: 9.5 %
NEUTROPHILS # BLD AUTO: 3.5 K/UL
NEUTROPHILS NFR BLD: 64.3 %
PHOSPHATE SERPL-MCNC: 3.3 MG/DL
PLATELET # BLD AUTO: 142 K/UL
PMV BLD AUTO: 8.6 FL
POCT GLUCOSE: 221 MG/DL (ref 70–110)
POCT GLUCOSE: 250 MG/DL (ref 70–110)
POCT GLUCOSE: 271 MG/DL (ref 70–110)
POCT GLUCOSE: 294 MG/DL (ref 70–110)
POCT GLUCOSE: 300 MG/DL (ref 70–110)
POTASSIUM SERPL-SCNC: 3.8 MMOL/L
PROT SERPL-MCNC: 5.3 G/DL
RBC # BLD AUTO: 3.27 M/UL
SODIUM SERPL-SCNC: 140 MMOL/L
WBC # BLD AUTO: 5.38 K/UL

## 2017-05-31 PROCEDURE — 83735 ASSAY OF MAGNESIUM: CPT

## 2017-05-31 PROCEDURE — 25000003 PHARM REV CODE 250: Performed by: NURSE PRACTITIONER

## 2017-05-31 PROCEDURE — 25000003 PHARM REV CODE 250: Performed by: STUDENT IN AN ORGANIZED HEALTH CARE EDUCATION/TRAINING PROGRAM

## 2017-05-31 PROCEDURE — 84100 ASSAY OF PHOSPHORUS: CPT

## 2017-05-31 PROCEDURE — 85025 COMPLETE CBC W/AUTO DIFF WBC: CPT

## 2017-05-31 PROCEDURE — 80053 COMPREHEN METABOLIC PANEL: CPT

## 2017-05-31 PROCEDURE — 63600175 PHARM REV CODE 636 W HCPCS: Performed by: NURSE PRACTITIONER

## 2017-05-31 PROCEDURE — 20600001 HC STEP DOWN PRIVATE ROOM

## 2017-05-31 PROCEDURE — C9113 INJ PANTOPRAZOLE SODIUM, VIA: HCPCS | Performed by: NURSE PRACTITIONER

## 2017-05-31 PROCEDURE — 92507 TX SP LANG VOICE COMM INDIV: CPT

## 2017-05-31 PROCEDURE — 63600175 PHARM REV CODE 636 W HCPCS: Performed by: STUDENT IN AN ORGANIZED HEALTH CARE EDUCATION/TRAINING PROGRAM

## 2017-05-31 PROCEDURE — 99233 SBSQ HOSP IP/OBS HIGH 50: CPT | Mod: ,,, | Performed by: NURSE PRACTITIONER

## 2017-05-31 RX ORDER — LEVETIRACETAM 500 MG/1
500 TABLET ORAL 2 TIMES DAILY
Status: DISCONTINUED | OUTPATIENT
Start: 2017-05-31 | End: 2017-06-04 | Stop reason: HOSPADM

## 2017-05-31 RX ORDER — INSULIN ASPART 100 [IU]/ML
1-10 INJECTION, SOLUTION INTRAVENOUS; SUBCUTANEOUS EVERY 4 HOURS
Status: DISCONTINUED | OUTPATIENT
Start: 2017-05-31 | End: 2017-05-31

## 2017-05-31 RX ORDER — GLUCAGON 1 MG
1 KIT INJECTION
Status: DISCONTINUED | OUTPATIENT
Start: 2017-05-31 | End: 2017-06-04 | Stop reason: HOSPADM

## 2017-05-31 RX ORDER — INSULIN ASPART 100 [IU]/ML
1-10 INJECTION, SOLUTION INTRAVENOUS; SUBCUTANEOUS
Status: DISCONTINUED | OUTPATIENT
Start: 2017-06-01 | End: 2017-06-02

## 2017-05-31 RX ORDER — INSULIN ASPART 100 [IU]/ML
10 INJECTION, SOLUTION INTRAVENOUS; SUBCUTANEOUS
Status: DISCONTINUED | OUTPATIENT
Start: 2017-05-31 | End: 2017-06-02

## 2017-05-31 RX ORDER — INSULIN ASPART 100 [IU]/ML
1-10 INJECTION, SOLUTION INTRAVENOUS; SUBCUTANEOUS EVERY 6 HOURS PRN
Status: DISCONTINUED | OUTPATIENT
Start: 2017-05-31 | End: 2017-05-31

## 2017-05-31 RX ADMIN — DILTIAZEM HYDROCHLORIDE 60 MG: 60 TABLET, FILM COATED ORAL at 10:05

## 2017-05-31 RX ADMIN — PANTOPRAZOLE SODIUM 40 MG: 40 INJECTION, POWDER, FOR SOLUTION INTRAVENOUS at 08:05

## 2017-05-31 RX ADMIN — LEVETIRACETAM 500 MG: 5 INJECTION INTRAVENOUS at 08:05

## 2017-05-31 RX ADMIN — LEVETIRACETAM 500 MG: 500 TABLET, FILM COATED ORAL at 10:05

## 2017-05-31 RX ADMIN — INSULIN ASPART 3 UNITS: 100 INJECTION, SOLUTION INTRAVENOUS; SUBCUTANEOUS at 07:05

## 2017-05-31 RX ADMIN — DILTIAZEM HYDROCHLORIDE 60 MG: 60 TABLET, FILM COATED ORAL at 05:05

## 2017-05-31 RX ADMIN — INSULIN ASPART 4 UNITS: 100 INJECTION, SOLUTION INTRAVENOUS; SUBCUTANEOUS at 04:05

## 2017-05-31 RX ADMIN — PANTOPRAZOLE SODIUM 40 MG: 40 INJECTION, POWDER, FOR SOLUTION INTRAVENOUS at 10:05

## 2017-05-31 RX ADMIN — INSULIN ASPART 10 UNITS: 100 INJECTION, SOLUTION INTRAVENOUS; SUBCUTANEOUS at 08:05

## 2017-05-31 RX ADMIN — DILTIAZEM HYDROCHLORIDE 60 MG: 60 TABLET, FILM COATED ORAL at 04:05

## 2017-05-31 RX ADMIN — INSULIN ASPART 4 UNITS: 100 INJECTION, SOLUTION INTRAVENOUS; SUBCUTANEOUS at 12:05

## 2017-05-31 NOTE — NURSING TRANSFER
Nursing Transfer Note      5/31/2017     Transfer To: 1024    Transfer via bed    Transfer with cardiac monitoring    Transported by RN and PCT    Medicines sent: Insulin Pen x2    Chart send with patient: Yes    Notified: spouse    Upon arrival to floor: cardiac monitor applied, patient oriented to room, call bell in reach and bed in lowest position    Report given to DIANA Root

## 2017-05-31 NOTE — PLAN OF CARE
SW met with Pt and Pt wife at bedside to introduce self. Discussed possible recs for rehab and reported therapy will see him tomorrow to make a recommendation.     Bessy Mcfarland, HANDY  Neurocritical Care   [facility]  [id number]

## 2017-05-31 NOTE — PROGRESS NOTES
[facility]  Neurology  Progress Note    Patient Name: Bessy Nunez  MRN: [id number]  Admission Date: 5/23/2017  Hospital Length of Stay: 8 days  Code Status: Full Code   Attending Provider: Davey Tom MD  Primary Care Physician: Edgar Nova MD   Principal Problem:Encephalopathy      Subjective:     Interval History: Per patient's wife, patient's MS best today than it has been all week.  Plan for ophthal eval today.    Current Neurological Medications: Keppra     Current Facility-Administered Medications   Medication Dose Route Frequency Provider Last Rate Last Dose    acetaminophen tablet 650 mg  650 mg Oral Q6H PRN Gilberto Díaz MD   650 mg at 05/28/17 1243    atorvastatin tablet 40 mg  40 mg Oral Daily Donna Jason MD   Stopped at 05/30/17 0900    dextrose 50% injection 12.5 g  12.5 g Intravenous PRN Donna Jason MD   12.5 g at 05/28/17 0936    dextrose 50% injection 12.5 g  12.5 g Intravenous PRN William French NP        dextrose 50% injection 25 g  25 g Intravenous PRN Donna Jason MD        diltiaZEM tablet 60 mg  60 mg Oral Q8H Guanaco Story MD   60 mg at 05/31/17 0519    glucagon (human recombinant) injection 1 mg  1 mg Intramuscular PRN Donna Jason MD        glucagon (human recombinant) injection 1 mg  1 mg Intramuscular PRN William French NP        glucose chewable tablet 16 g  16 g Oral PRN Donna Jason MD        glucose chewable tablet 24 g  24 g Oral PRN Donna Jason MD        insulin aspart pen 1-10 Units  1-10 Units Subcutaneous Q6H PRN William French NP   4 Units at 05/31/17 1220    insulin detemir pen 35 Units  35 Units Subcutaneous QHS Guanaco Story MD   35 Units at 05/30/17 2130    levetiracetam in NaCl (iso-os) IVPB 500 mg  500 mg Intravenous Q12H Guanaco Story  mL/hr at 05/31/17 0820 500 mg at 05/31/17 0820    omnipaque oral solution 15  mL  15 mL Oral PRN Horace Hernandez MD        pantoprazole injection 40 mg  40 mg Intravenous BID William French NP   40 mg at 05/31/17 0820    sodium chloride 0.9% bolus 1,000 mL  1,000 mL Intravenous Once Guanaco Story MD        vitamin D 1000 units tablet 5,000 Units  5,000 Units Oral Daily Guanaco Story MD   Stopped at 05/30/17 0900       Review of Systems   Constitutional: Positive for activity change.   Eyes: Positive for visual disturbance.   Respiratory: Negative for shortness of breath.    Cardiovascular: Negative for chest pain.   Gastrointestinal: Negative for nausea and vomiting.   Skin: Negative for rash and wound.   Psychiatric/Behavioral: Positive for confusion. Negative for agitation and behavioral problems.     Objective:     Vital Signs (Most Recent):  Temp: 97.8 °F (36.6 °C) (05/31/17 1100)  Pulse: 86 (05/31/17 1100)  Resp: (!) 26 (05/31/17 1100)  BP: 131/66 (05/31/17 1100)  SpO2: 98 % (05/31/17 1100) Vital Signs (24h Range):  Temp:  [97.7 °F (36.5 °C)-99.3 °F (37.4 °C)] 97.8 °F (36.6 °C)  Pulse:  [70-98] 86  Resp:  [12-32] 26  SpO2:  [93 %-99 %] 98 %  BP: (116-178)/(58-84) 131/66     Weight: 118.4 kg (261 lb 0.4 oz)  Body mass index is 36.41 kg/m².    Physical Exam   Constitutional: He appears well-developed and well-nourished. No distress.   HENT:   Head: Normocephalic and atraumatic.   Pulmonary/Chest: Effort normal.   Neurological: He is alert. He has a normal Finger-Nose-Finger Test.   Reflex Scores:       Bicep reflexes are 1+ on the right side and 1+ on the left side.       Brachioradialis reflexes are 1+ on the right side and 1+ on the left side.       Patellar reflexes are 1+ on the right side and 1+ on the left side.  Skin: Skin is warm and dry. He is not diaphoretic.   Psychiatric: His speech is normal.   More interactive today.   Nursing note and vitals reviewed.      NEUROLOGICAL EXAMINATION:     MENTAL STATUS   Attention: decreased. Concentration: normal.    Speech: speech is normal   Level of consciousness: alert       Able to spell WORLD forwards but not backwards.  Cannot subtract 7 from 100.     CRANIAL NERVES     CN II   Visual fields full to confrontation.     CN V   Facial sensation intact.     CN VII   Facial expression full, symmetric.     CN VIII   Hearing: intact    CN XI   Right sternocleidomastoid strength: normal  Left sternocleidomastoid strength: normal    CN XII   Tongue: not atrophic  Fasciculations: absent  Tongue deviation: none       Mild L JAYDEN.  Improving.     MOTOR EXAM   Muscle bulk: normal  Overall muscle tone: normal    Strength   Right biceps: 5/5  Left biceps: 5/5  Right triceps: 5/5  Left triceps: 5/5  Right quadriceps: 5/5  Left quadriceps: 4/5       Weaker on LLE than RLE.       REFLEXES     Reflexes   Right brachioradialis: 1+  Left brachioradialis: 1+  Right biceps: 1+  Left biceps: 1+  Right patellar: 1+  Left patellar: 1+    SENSORY EXAM   Light touch normal.     GAIT AND COORDINATION      Coordination   Finger to nose coordination: normal      Significant Labs:   Hemoglobin A1c:     Recent Labs  Lab 05/13/17  0138   HGBA1C 11.4*     Blood Culture:   No results for input(s): LABBLOO in the last 48 hours.  BMP:     Recent Labs  Lab 05/30/17 0535 05/31/17  0257   GLU 98 231*    140   K 3.7 3.8    112*   CO2 22* 20*   BUN 5* 10   CREATININE 0.9 1.0   CALCIUM 8.8 8.3*   MG 1.7 1.8     CBC:     Recent Labs  Lab 05/29/17  2131 05/30/17  0634 05/31/17 0257   WBC  --  5.65 5.38  5.38  5.38   HGB 9.4* 9.9* 9.7*  9.7*  9.7*   HCT  --  28.8* 28.2*  28.2*  28.2*   PLT  --  137* 142*  142*  142*     CMP:     Recent Labs  Lab 05/30/17 0535 05/31/17  0257   GLU 98 231*    140   K 3.7 3.8    112*   CO2 22* 20*   BUN 5* 10   CREATININE 0.9 1.0   CALCIUM 8.8 8.3*   MG 1.7 1.8   PROT 5.8* 5.3*   ALBUMIN 2.7* 2.4*   BILITOT 0.7 0.5   ALKPHOS 90 86   AST 29 22   ALT 36 26   ANIONGAP 11 8   EGFRNONAA >60.0 >60.0     CSF  Culture: No results for input(s): CSFCULTURE in the last 48 hours.  CSF Studies: No results for input(s): ALIQUT, APPEARCSF, COLORCSF, CSFWBC, CSFRBC, GLUCCSF, LDHCSF, PROTEINCSF, VDRLCSF in the last 48 hours.  Inflammatory Markers: No results for input(s): SEDRATE, CRP, PROCAL in the last 48 hours.  POCT Glucose:     Recent Labs  Lab 05/30/17  1734 05/30/17 2130   POCTGLUCOSE 221* 300*     Respiratory Culture: No results for input(s): GSRESP, RESPIRATORYC in the last 48 hours.  Urine Culture: No results for input(s): LABURIN in the last 48 hours.  Urine Studies: No results for input(s): COLORU, APPEARANCEUA, PHUR, SPECGRAV, PROTEINUA, GLUCUA, KETONESU, BILIRUBINUA, OCCULTUA, NITRITE, UROBILINOGEN, LEUKOCYTESUR, RBCUA, WBCUA, BACTERIA, SQUAMEPITHEL, HYALINECASTS in the last 48 hours.    Invalid input(s): WRIGHTSUR  Recent Lab Results       05/31/17  0257 05/30/17  2130 05/30/17  1734      Albumin 2.4(L)       Alkaline Phosphatase 86       ALT 26       Anion Gap 8       AST 22       Baso # 0.01       Basophil% 0.2       Total Bilirubin 0.5  Comment:  For infants and newborns, interpretation of results should be based  on gestational age, weight and in agreement with clinical  observations.  Premature Infant recommended reference ranges:  Up to 24 hours.............<8.0 mg/dL  Up to 48 hours............<12.0 mg/dL  3-5 days..................<15.0 mg/dL  6-29 days.................<15.0 mg/dL         BUN, Bld 10       Calcium 8.3(L)       Chloride 112(H)       CO2 20(L)       Creatinine 1.0       Differential Method Automated       eGFR if  >60.0       eGFR if non  >60.0  Comment:  Calculation used to obtain the estimated glomerular filtration  rate (eGFR) is the CKD-EPI equation. Since race is unknown   in our information system, the eGFR values for   -American and Non--American patients are given   for each creatinine result.         Eos # 0.1       Eosinophil% 1.1        Glucose 231(H)       Gran # 3.5       Gran% 64.3       Hematocrit 28.2(L)        28.2(L)        28.2(L)       Hemoglobin 9.7(L)        9.7(L)        9.7(L)       Lymph # 1.3       Lymph% 24.7       Magnesium 1.8       MCH 29.7        29.7        29.7       MCHC 34.4        34.4        34.4       MCV 86        86        86       Mono # 0.5       Mono% 9.5       MPV 8.6(L)        8.6(L)        8.6(L)       Phosphorus 3.3       Platelets 142(L)        142(L)        142(L)       POCT Glucose  300(H) 221(H)     Potassium 3.8       Total Protein 5.3(L)       RBC 3.27(L)        3.27(L)        3.27(L)       RDW 15.0(H)        15.0(H)        15.0(H)       Sodium 140       WBC 5.38        5.38        5.38             Significant Imaging:  MRI brain 5/13/17:  Multiple foci of FLAIR hyperintensity in the deep cerebral periventricular white matter in a configuration and distribution which can be seen in the setting of underlying demyelinating process such as multiple sclerosis. There are several punctate foci of restricted diffusion including the left aspect of the midbrain tectum which in light of the aforementioned white matter changes may reflect regions of recent demyelination. Superimposed small acute infarcts would be difficult to exclude, although are felt less likely given the overall constellation of findings. Additionally, there is a 0.7 cm enhancing T2/FLAIR identified in the anterior right thalamus concerning for focus of active demyelination.           Also old lesions in corpus collosum         CTH 5/26/17  No detrimental change in this patient with volume loss, and multiple areas of hyperattenuation consistent with a infarcts, and periventricular hypoattenuation likely related to the patient's demyelinating disease.    MRI Brain 5/26/17  Please note evaluation is markedly limited by motion artifact, and the patient could not complete the exam.  However, the limited diffusion MRI findings are most compatible with  acute infarction involving the right frontal and right temporal lobes as well as the left cerebellar hemisphere and left brainstem, as above.  No evidence of intracranial hemorrhage.    MRI Brain 5/28/17   Small to punctate-sized scattered foci of signal abnormality cerebral hemispheres, left cerebellum and brainstem similarly seen on the prior allowing for motion limitation.  This is superimposed on confluent regions of T2 flair signal abnormality supratentorial white matter and jose in light of corpus callosal involvement most suggestive for areas of recent or active demyelination superimposed on prior demyelination.  However alternative differential including areas of infarction with remote lacunar-type infarcts and age advanced chronic microvascular ischemic changes not excluded.    There is a small focus enhancement in the left dorsal jose differential to include but not limited to active demyelination versus subacute infarction.    Allowing for motion compared to prior there is no significant interval change.  No hydrocephalus or midline shift.        Assessment and Plan:     Fall    Suspected seizure  Continue keppra 500mg bid  EEG negative for seizure; generalized slowing        Diplopia    Improving        Leukocytosis    Resolved.  Likely 2/2 steroids        Demyelinating changes in brain    As seen on numerous MRIs of the brain.   S/p 5.5g solumedrol last admission, followed by prednisone taper as an outpatient    DDx includes MS, Susac's syndrome, vasculitis, stroke.    CSF 5/14 negative for oligoclonal bands.  Normal IgG index.  WBC 2.  Protein 93 (slightly more elevated than would be expected for a demyelinating process, but not thought to represent meningitis/encephalitis; no antibiotics or antivirals started.  Consider vasculitis.)  MRA Head and Neck negative for any significant stenosis or occlusion    Ok to hold off on GIOVANNI for now for possible stroke w/u / endocarditis causing infarcts in bilateral  anterior and posterior vascular territories; will pursue vasculitis workup at this time.  VN following (and do not recommend GIOVANNI at this time).        Internuclear ophthalmoplegia of left eye    Improving  Received 5.5g solumedrol last admission    Patient continues to complain of blurred vision  Concern for vasculitis    Plan for 1pm appointment with Dr. Mixon (ophthalmology - retina) in the ophthal clinic for eval for possible vasculitis (Susac's syndrome?  No hearing loss.).  Appreciate ophthal's assistance.        Depressive disorder, not elsewhere classified    Sertraline 100mg discontinued this admission - consider restarting / uptitrating / changing to a different antidepressant for better symptom control.    Abulia?  (Improving.)  Numerous frontal lesions.        * Encephalopathy    Improving    Anti-Phospholipid Ab  Cardiolipin Ab negative  Continue empiric keppra 500mg bid for now, given hx    Lyme titer (neg), WNV (neg), SSA (neg) , SSB (neg), dsDNA (neg), SS-A (neg), SS-B (neg), ESR 15, CRP 5.37, HIV neg, REYMUNDO (neg)     F/u autoimmune encephalopathy panel    24h EEG 5/26 negative for simple partial seizures / underlying etiology of waxing and waning MS            VTE Risk Mitigation         Ordered     Place sequential compression device  Until discontinued      05/24/17 0303     Medium Risk of VTE  Once      05/24/17 0303          Sharon Luong MD  Neurology  [facility]

## 2017-05-31 NOTE — PLAN OF CARE
Problem: Patient Care Overview  Goal: Plan of Care Review  Outcome: Ongoing (interventions implemented as appropriate)  POC reviewed w/ Mr. Nunez and family throughout the night; verbalized understanding. Questions and concerns addressed. Pt VSS. No acute events overnight. Will continue to monitor. See flowsheet for further assessment and VS info.

## 2017-05-31 NOTE — SUBJECTIVE & OBJECTIVE
Subjective:     Interval History: Per patient's wife, patient's MS best today than it has been all week.  Plan for ophthal eval today.    Current Neurological Medications: Keppra     Current Facility-Administered Medications   Medication Dose Route Frequency Provider Last Rate Last Dose    acetaminophen tablet 650 mg  650 mg Oral Q6H PRN Gilberto Díaz MD   650 mg at 05/28/17 1243    atorvastatin tablet 40 mg  40 mg Oral Daily Donna Jason MD   Stopped at 05/30/17 0900    dextrose 50% injection 12.5 g  12.5 g Intravenous PRN Donna Jason MD   12.5 g at 05/28/17 0936    dextrose 50% injection 12.5 g  12.5 g Intravenous PRN William French NP        dextrose 50% injection 25 g  25 g Intravenous PRN Donna Jason MD        diltiaZEM tablet 60 mg  60 mg Oral Q8H Guanaco Story MD   60 mg at 05/31/17 0519    glucagon (human recombinant) injection 1 mg  1 mg Intramuscular PRN Donna Jason MD        glucagon (human recombinant) injection 1 mg  1 mg Intramuscular PRN William French NP        glucose chewable tablet 16 g  16 g Oral PRN Donna Jason MD        glucose chewable tablet 24 g  24 g Oral PRN Donna Jason MD        insulin aspart pen 1-10 Units  1-10 Units Subcutaneous Q6H PRN William French NP   4 Units at 05/31/17 1220    insulin detemir pen 35 Units  35 Units Subcutaneous QHS Guanaco Story MD   35 Units at 05/30/17 2130    levetiracetam in NaCl (iso-os) IVPB 500 mg  500 mg Intravenous Q12H Guanaco Story  mL/hr at 05/31/17 0820 500 mg at 05/31/17 0820    omnipaque oral solution 15 mL  15 mL Oral PRN Horace Hernandez MD        pantoprazole injection 40 mg  40 mg Intravenous BID William French NP   40 mg at 05/31/17 0820    sodium chloride 0.9% bolus 1,000 mL  1,000 mL Intravenous Once Guanaco Story MD        vitamin D 1000 units tablet 5,000 Units  5,000 Units Oral Daily  Guanaco Story MD   Stopped at 05/30/17 0900       Review of Systems   Constitutional: Positive for activity change.   Eyes: Positive for visual disturbance.   Respiratory: Negative for shortness of breath.    Cardiovascular: Negative for chest pain.   Gastrointestinal: Negative for nausea and vomiting.   Skin: Negative for rash and wound.   Psychiatric/Behavioral: Positive for confusion. Negative for agitation and behavioral problems.     Objective:     Vital Signs (Most Recent):  Temp: 97.8 °F (36.6 °C) (05/31/17 1100)  Pulse: 86 (05/31/17 1100)  Resp: (!) 26 (05/31/17 1100)  BP: 131/66 (05/31/17 1100)  SpO2: 98 % (05/31/17 1100) Vital Signs (24h Range):  Temp:  [97.7 °F (36.5 °C)-99.3 °F (37.4 °C)] 97.8 °F (36.6 °C)  Pulse:  [70-98] 86  Resp:  [12-32] 26  SpO2:  [93 %-99 %] 98 %  BP: (116-178)/(58-84) 131/66     Weight: 118.4 kg (261 lb 0.4 oz)  Body mass index is 36.41 kg/m².    Physical Exam   Constitutional: He appears well-developed and well-nourished. No distress.   HENT:   Head: Normocephalic and atraumatic.   Pulmonary/Chest: Effort normal.   Neurological: He is alert. He has a normal Finger-Nose-Finger Test.   Reflex Scores:       Bicep reflexes are 1+ on the right side and 1+ on the left side.       Brachioradialis reflexes are 1+ on the right side and 1+ on the left side.       Patellar reflexes are 1+ on the right side and 1+ on the left side.  Skin: Skin is warm and dry. He is not diaphoretic.   Psychiatric: His speech is normal.   More interactive today.   Nursing note and vitals reviewed.      NEUROLOGICAL EXAMINATION:     MENTAL STATUS   Attention: decreased. Concentration: normal.   Speech: speech is normal   Level of consciousness: alert       Able to spell WORLD forwards but not backwards.  Cannot subtract 7 from 100.     CRANIAL NERVES     CN II   Visual fields full to confrontation.     CN V   Facial sensation intact.     CN VII   Facial expression full, symmetric.     CN VIII    Hearing: intact    CN XI   Right sternocleidomastoid strength: normal  Left sternocleidomastoid strength: normal    CN XII   Tongue: not atrophic  Fasciculations: absent  Tongue deviation: none       Mild L JAYDEN.  Improving.     MOTOR EXAM   Muscle bulk: normal  Overall muscle tone: normal    Strength   Right biceps: 5/5  Left biceps: 5/5  Right triceps: 5/5  Left triceps: 5/5  Right quadriceps: 5/5  Left quadriceps: 4/5       Weaker on LLE than RLE.       REFLEXES     Reflexes   Right brachioradialis: 1+  Left brachioradialis: 1+  Right biceps: 1+  Left biceps: 1+  Right patellar: 1+  Left patellar: 1+    SENSORY EXAM   Light touch normal.     GAIT AND COORDINATION      Coordination   Finger to nose coordination: normal      Significant Labs:   Hemoglobin A1c:     Recent Labs  Lab 05/13/17  0138   HGBA1C 11.4*     Blood Culture:   No results for input(s): LABBLOO in the last 48 hours.  BMP:     Recent Labs  Lab 05/30/17 0535 05/31/17 0257   GLU 98 231*    140   K 3.7 3.8    112*   CO2 22* 20*   BUN 5* 10   CREATININE 0.9 1.0   CALCIUM 8.8 8.3*   MG 1.7 1.8     CBC:     Recent Labs  Lab 05/29/17  2131 05/30/17  0634 05/31/17  0257   WBC  --  5.65 5.38  5.38  5.38   HGB 9.4* 9.9* 9.7*  9.7*  9.7*   HCT  --  28.8* 28.2*  28.2*  28.2*   PLT  --  137* 142*  142*  142*     CMP:     Recent Labs  Lab 05/30/17 0535 05/31/17  0257   GLU 98 231*    140   K 3.7 3.8    112*   CO2 22* 20*   BUN 5* 10   CREATININE 0.9 1.0   CALCIUM 8.8 8.3*   MG 1.7 1.8   PROT 5.8* 5.3*   ALBUMIN 2.7* 2.4*   BILITOT 0.7 0.5   ALKPHOS 90 86   AST 29 22   ALT 36 26   ANIONGAP 11 8   EGFRNONAA >60.0 >60.0     CSF Culture: No results for input(s): CSFCULTURE in the last 48 hours.  CSF Studies: No results for input(s): ALIQUT, APPEARCSF, COLORCSF, CSFWBC, CSFRBC, GLUCCSF, LDHCSF, PROTEINCSF, VDRLCSF in the last 48 hours.  Inflammatory Markers: No results for input(s): SEDRATE, CRP, PROCAL in the last 48 hours.  POCT  Glucose:     Recent Labs  Lab 05/30/17  1734 05/30/17 2130   POCTGLUCOSE 221* 300*     Respiratory Culture: No results for input(s): GSRESP, RESPIRATORYC in the last 48 hours.  Urine Culture: No results for input(s): LABURIN in the last 48 hours.  Urine Studies: No results for input(s): COLORU, APPEARANCEUA, PHUR, SPECGRAV, PROTEINUA, GLUCUA, KETONESU, BILIRUBINUA, OCCULTUA, NITRITE, UROBILINOGEN, LEUKOCYTESUR, RBCUA, WBCUA, BACTERIA, SQUAMEPITHEL, HYALINECASTS in the last 48 hours.    Invalid input(s): WRIGHTSUR  Recent Lab Results       05/31/17  0257 05/30/17 2130 05/30/17 1734      Albumin 2.4(L)       Alkaline Phosphatase 86       ALT 26       Anion Gap 8       AST 22       Baso # 0.01       Basophil% 0.2       Total Bilirubin 0.5  Comment:  For infants and newborns, interpretation of results should be based  on gestational age, weight and in agreement with clinical  observations.  Premature Infant recommended reference ranges:  Up to 24 hours.............<8.0 mg/dL  Up to 48 hours............<12.0 mg/dL  3-5 days..................<15.0 mg/dL  6-29 days.................<15.0 mg/dL         BUN, Bld 10       Calcium 8.3(L)       Chloride 112(H)       CO2 20(L)       Creatinine 1.0       Differential Method Automated       eGFR if  >60.0       eGFR if non  >60.0  Comment:  Calculation used to obtain the estimated glomerular filtration  rate (eGFR) is the CKD-EPI equation. Since race is unknown   in our information system, the eGFR values for   -American and Non--American patients are given   for each creatinine result.         Eos # 0.1       Eosinophil% 1.1       Glucose 231(H)       Gran # 3.5       Gran% 64.3       Hematocrit 28.2(L)        28.2(L)        28.2(L)       Hemoglobin 9.7(L)        9.7(L)        9.7(L)       Lymph # 1.3       Lymph% 24.7       Magnesium 1.8       MCH 29.7        29.7        29.7       MCHC 34.4        34.4        34.4       MCV 86         86        86       Mono # 0.5       Mono% 9.5       MPV 8.6(L)        8.6(L)        8.6(L)       Phosphorus 3.3       Platelets 142(L)        142(L)        142(L)       POCT Glucose  300(H) 221(H)     Potassium 3.8       Total Protein 5.3(L)       RBC 3.27(L)        3.27(L)        3.27(L)       RDW 15.0(H)        15.0(H)        15.0(H)       Sodium 140       WBC 5.38        5.38        5.38             Significant Imaging:  MRI brain 5/13/17:  Multiple foci of FLAIR hyperintensity in the deep cerebral periventricular white matter in a configuration and distribution which can be seen in the setting of underlying demyelinating process such as multiple sclerosis. There are several punctate foci of restricted diffusion including the left aspect of the midbrain tectum which in light of the aforementioned white matter changes may reflect regions of recent demyelination. Superimposed small acute infarcts would be difficult to exclude, although are felt less likely given the overall constellation of findings. Additionally, there is a 0.7 cm enhancing T2/FLAIR identified in the anterior right thalamus concerning for focus of active demyelination.           Also old lesions in corpus collosum         CTH 5/26/17  No detrimental change in this patient with volume loss, and multiple areas of hyperattenuation consistent with a infarcts, and periventricular hypoattenuation likely related to the patient's demyelinating disease.    MRI Brain 5/26/17  Please note evaluation is markedly limited by motion artifact, and the patient could not complete the exam.  However, the limited diffusion MRI findings are most compatible with acute infarction involving the right frontal and right temporal lobes as well as the left cerebellar hemisphere and left brainstem, as above.  No evidence of intracranial hemorrhage.    MRI Brain 5/28/17   Small to punctate-sized scattered foci of signal abnormality cerebral hemispheres, left cerebellum and  brainstem similarly seen on the prior allowing for motion limitation.  This is superimposed on confluent regions of T2 flair signal abnormality supratentorial white matter and jose in light of corpus callosal involvement most suggestive for areas of recent or active demyelination superimposed on prior demyelination.  However alternative differential including areas of infarction with remote lacunar-type infarcts and age advanced chronic microvascular ischemic changes not excluded.    There is a small focus enhancement in the left dorsal jose differential to include but not limited to active demyelination versus subacute infarction.    Allowing for motion compared to prior there is no significant interval change.  No hydrocephalus or midline shift.

## 2017-05-31 NOTE — SUBJECTIVE & OBJECTIVE
Interval History:    -plans for GIOVANNI today  -Cerebral angio to eval vasculitis per Neurology recs  -increase activity with PT/OT      Review of Systems   Respiratory: Negative for apnea, cough, choking, chest tightness, shortness of breath, wheezing and stridor.    Cardiovascular: Negative for chest pain, palpitations and leg swelling.   Gastrointestinal: Negative for abdominal distention, constipation and vomiting.   Skin: Negative for color change and pallor.       Objective:     Vitals:  Temp: 97.7 °F (36.5 °C) (05/31/17 0700)  Pulse: 81 (05/31/17 0700)  Resp: 14 (05/31/17 0700)  BP: 138/71 (05/31/17 0700)  SpO2: 95 % (05/31/17 0700)    Temp:  [97.7 °F (36.5 °C)-99.3 °F (37.4 °C)] 97.7 °F (36.5 °C)  Pulse:  [65-98] 81  Resp:  [12-32] 14  SpO2:  [93 %-99 %] 95 %  BP: (116-178)/(58-84) 138/71              05/30 0701 - 05/31 0700  In: 1329.8 [P.O.:400; I.V.:729.8]  Out: 1390 [Urine:1390]    Physical Exam   Eyes: EOM are normal. Pupils are equal, round, and reactive to light.   Cardiovascular: Normal rate, regular rhythm, normal heart sounds and intact distal pulses.  Exam reveals no gallop and no friction rub.    No murmur heard.  Pulmonary/Chest: Effort normal and breath sounds normal. No respiratory distress. He has no wheezes. He has no rales. He exhibits no tenderness.   Abdominal: Soft. Bowel sounds are normal. He exhibits no distension. There is no tenderness.   Skin: Skin is warm and dry. Capillary refill takes less than 2 seconds. There is erythema.            NEUROLOGICAL EXAMINATION:     MENTAL STATUS        E4 V5 M6  AAO x person, place, year.   Speech clear/fluent  PERRLA, EOMI  RAMIREZ, Follows commands  LUE 4/5  RUE 5/5  LLE 5/5  RLE 5/5       CRANIAL NERVES     CN III, IV, VI   Pupils are equal, round, and reactive to light.  Extraocular motions are normal.         Jarod Coma Scale      Medications:  Continuous Scheduled    atorvastatin 40 mg Daily   diltiaZEM 60 mg Q8H   insulin detemir 35 Units QHS    levetiracetam IVPB 500 mg Q12H   pantoprazole 40 mg BID   sodium chloride 0.9% 1,000 mL Once   vitamin D 5,000 Units Daily   PRN    acetaminophen 650 mg Q6H PRN   dextrose 50% 12.5 g PRN   dextrose 50% 12.5 g PRN   dextrose 50% 25 g PRN   glucagon (human recombinant) 1 mg PRN   glucagon (human recombinant) 1 mg PRN   glucose 16 g PRN   glucose 24 g PRN   insulin aspart 1-10 Units Q6H PRN   omnipaque 15 mL PRN     Today I personally reviewed pertinent medications, lines/drains/airways, imaging, cardiology, lab results, microbiology results,

## 2017-05-31 NOTE — PROGRESS NOTES
[facility]  Neurocritical Care  Progress Note    Admit Date: 5/23/2017  Service Date: 05/31/2017  Length of Stay: 8    Subjective:     Chief Complaint: Encephalopathy    History of Present Illness: Patient is a 56 y/o male with poorly controlled Type 2 diabetes on insulin, HTN, mixed HLP and recently diagnosed with multiple sclerosis here at [facility] 2 weeks ago and discharged last week from [facility] from Hospital Medicine Team 3 after receiving 5 days of high dose IV Solumedrol. Patient was discharged on Medrol dospepak. Patient according to Dr. Cho states double vision was improving and getting better until this 5/24 when had a severe episode of double vision and dizziness and blacked out at home. Patient came around but was complaining of severe double vision and wife noted patient also confused after coming around so went to [facility]. Patient fell on the way to the bathroom. He does not remember falling, nor having any prodrome. Wife states she heard the thump and found him sitting on the floor, staring blankly into space, and confused.      At [place], patient noted to have WBC 19, but is on steroids. Blood culture sent and CXR done that was unremarkable. CT imaging of head unremarkable for any new findings. Patient noted to have  and BUN/Cr of 40/1.1 so Dr. Cho felt patient may just be dehydrated so patient given 1 liter of NS in ER. Patient feeling better. He spoke with Neurology here at [facility] who recommended admit for evaluation due to worsening double vision but Dr. Cho thinks likely just related to dehydration as patient has been having high blood sugars at home and during recent hospitalization.      Pt admitted to hospital medicine 5/24.          Hospital Course: 5/24: admitted to hospital medicine for MS flare/worsening double vision  5/28: -- waxing and waning of mental status throughout the day. MRI findings concerning for ischemic event vs  active demyelination, with ADC correlation of DWI abnormality. MRI shows lesions suggestive of infarct in the R frontal and temporal lobes as well as the left cerebellar hemisphere and left brainstem, correlating with multiple vascular territories. .  5/29 EGD: Large duodenal ulcer & Multiple small clean based gastric ulcers treated with bipolar cautery.  5/30 Step Down to IM with Neurology following for MS, awaiting bed  5/31 Boarding for IM, plans for GIOVANNI today.         Interval History:    -Pending transfer to floor under IM with Neurology following  -plans for GIOVANNI today  -Cerebral angio to eval vasculitis per Neurology recs  -increase activity with PT/OT      Review of Systems   Respiratory: Negative for apnea, cough, choking, chest tightness, shortness of breath, wheezing and stridor.    Cardiovascular: Negative for chest pain, palpitations and leg swelling.   Gastrointestinal: Negative for abdominal distention, constipation and vomiting.   Skin: Negative for color change and pallor.       Objective:     Vitals:  Temp: 97.7 °F (36.5 °C) (05/31/17 0700)  Pulse: 81 (05/31/17 0700)  Resp: 14 (05/31/17 0700)  BP: 138/71 (05/31/17 0700)  SpO2: 95 % (05/31/17 0700)    Temp:  [97.7 °F (36.5 °C)-99.3 °F (37.4 °C)] 97.7 °F (36.5 °C)  Pulse:  [65-98] 81  Resp:  [12-32] 14  SpO2:  [93 %-99 %] 95 %  BP: (116-178)/(58-84) 138/71              05/30 0701 - 05/31 0700  In: 1329.8 [P.O.:400; I.V.:729.8]  Out: 1390 [Urine:1390]    Physical Exam   Eyes: EOM are normal. Pupils are equal, round, and reactive to light.   Cardiovascular: Normal rate, regular rhythm, normal heart sounds and intact distal pulses.  Exam reveals no gallop and no friction rub.    No murmur heard.  Pulmonary/Chest: Effort normal and breath sounds normal. No respiratory distress. He has no wheezes. He has no rales. He exhibits no tenderness.   Abdominal: Soft. Bowel sounds are normal. He exhibits no distension. There is no tenderness.   Skin: Skin is warm  and dry. Capillary refill takes less than 2 seconds. There is erythema.            NEUROLOGICAL EXAMINATION:     MENTAL STATUS        E4 V5 M6  AAO x person, place, year.   Speech clear/fluent  PERRLA, EOMI  RAMIREZ, Follows commands  LUE 4/5  RUE 5/5  LLE 5/5  RLE 5/5       CRANIAL NERVES     CN III, IV, VI   Pupils are equal, round, and reactive to light.  Extraocular motions are normal.         Jarod Coma Scale      Medications:  Continuous Scheduled    atorvastatin 40 mg Daily   diltiaZEM 60 mg Q8H   insulin detemir 35 Units QHS   levetiracetam IVPB 500 mg Q12H   pantoprazole 40 mg BID   sodium chloride 0.9% 1,000 mL Once   vitamin D 5,000 Units Daily   PRN    acetaminophen 650 mg Q6H PRN   dextrose 50% 12.5 g PRN   dextrose 50% 12.5 g PRN   dextrose 50% 25 g PRN   glucagon (human recombinant) 1 mg PRN   glucagon (human recombinant) 1 mg PRN   glucose 16 g PRN   glucose 24 g PRN   insulin aspart 1-10 Units Q6H PRN   omnipaque 15 mL PRN     Today I personally reviewed pertinent medications, lines/drains/airways, imaging, cardiology, lab results, microbiology results,     Assessment/Plan:     Neuro   Acute encephalopathy    -Vascular Neurology   - Neurology following for MS  -24 hr EEG completed- non-focal slowing- indicative of encephalopathy   -keppra 500 BID  -MRI findings concerning for ischemic event vs active demyelination  -MRI shows lesions suggestive of infarct in the R frontal and temporal lobes as well as the left cerebellar hemisphere and left brainstem, correlating with multiple vascular territories.   -GIOVANNI recommended r/o cardioembolic phenomenon and Cardiogy consulted. Cardiology feels TTE more appropriate given no leukocytosis, afebrile, Cx NGTD and increased risk with recent GI bleed  -work up for auto-immune as vasculitis in progress  -plan for cerebral angio to eval vasculitis 6/1  -PT OT SLP        Demyelinating changes in brain    -As seen on MRIs from 5/13, no spinal cord lesions  -S/p 5.5g  solumedrol last admission  -Discussed with MS team - will need outpatient f/u appointment        Internuclear ophthalmoplegia of left eye    -Improving  -Received 5.5g solumedrol last admission  -May be 2/2 MS (suspected, but not confirmed)        Cardiac   Essential hypertension    -SBP<180  -Dilt resumed,   -Atenolol/diovan held, -160  -Ef 60-65        Hem/Onc   Leukocytosis    -resolved  -likely 2/2 steroids  -Cx NGTD          Endocrine   Type 2 diabetes mellitus with hyperglycemia, with long-term current use of insulin    -SSI increased to moderate and changed to q4hrs.   -Detemir 35,   -Asparte 18units preprandial stopped, as patient is NPO.   -A1C 11.4        Fluids/Electrolytes/Nutrition/GI   Upper GI bleed    -large melanotic stool prior to transfer to [facility]  -H 7.4, transfuse 2 U PRBC on 5/28  -holding antiplatelets and DVT proph secondary to bleed  -possibly 2/2 recent steroids  -EGD 5/29, multiple ulcers cauterized   GI recs: PPI IV BID x 72 hrs then transition (6/2) to PPI BID PO for 8 weeks   Repeat EGD in 8 weeks to assess healing   Check H pylori serology and treat if positive.              Obesity    -BMI 36.4  -Rec Lifestyle change        Mixed hyperlipidemia    -continue daily statin         Other   Acute blood loss anemia    -2/2 UGI bleed  -Hgb 9.9, HDS            Prophylaxis:  Venous Thromboembolism: mechanical;  Stress Ulcer: PPI  Ventilator Pneumonia: not applicable     Activity Orders          Up with assistance starting at 05/28 1711    Up with assistance starting at 05/28 1635    Straight Cath starting at 05/27 0813        Full Code   Level 3  I spent >35 minutes reviewing patient records, examining, and counseling the patient with greater than 50% of the time spent with direct patient care and coordination.       William French, MORAIMA  Neurocritical Care  [facility]

## 2017-05-31 NOTE — PT/OT/SLP PROGRESS
Physical Therapy      Bessy Nunez  MRN: [id number]    PT attempted to see PT for evaluation twice this date. Pt MERCEDES for opthalmology appointment, then RN preparing to transfer pt to floor and NP present in room talking with pt and family. RN asked PT to see pt tomorrow.  Will follow-up at next scheduled visit.    Ebonie Harris, PT   05/31/2017

## 2017-05-31 NOTE — ASSESSMENT & PLAN NOTE
-large melanotic stool prior to transfer to [facility]  -H 7.4, transfuse 2 U PRBC on 5/28  -holding antiplatelets and DVT proph secondary to bleed  -possibly 2/2 recent steroids  -EGD 5/29, multiple ulcers cauterized   GI recs: PPI IV BID x 72 hrs then transition (6/2) to PPI BID PO for 8 weeks   Repeat EGD in 8 weeks to assess healing   Check H pylori serology and treat if positive.

## 2017-05-31 NOTE — ASSESSMENT & PLAN NOTE
-Vascular Neurology   - Neurology following for MS  -24 hr EEG completed- non-focal slowing- indicative of encephalopathy   -keppra 500 BID  -MRI findings concerning for ischemic event vs active demyelination  -MRI shows lesions suggestive of infarct in the R frontal and temporal lobes as well as the left cerebellar hemisphere and left brainstem, correlating with multiple vascular territories.   -GIOVANNI recommended r/o cardioembolic phenomenon and Cardiogy consulted. Cardiology feels TTE more appropriate given no leukocytosis, afebrile, Cx NGTD and increased risk with recent GI bleed  -work up for auto-immune as vasculitis in progress  -plan for cerebral angio to eval vasculitis 6/1  -PT OT SLP

## 2017-05-31 NOTE — PT/OT/SLP PROGRESS
"Speech Language Pathology  Treatment    Bessy Nunez   MRN: [id number]   Admitting Diagnosis: Encephalopathy    Diet recommendations: Solid Diet Level: Regular  Liquid Diet Level: Thin     SLP Treatment Date: 05/31/17  Speech Start Time: 1045     Speech Stop Time: 1110     Speech Total (min): 25 min       TREATMENT BILLABLE MINUTES:  Speech Therapy Individual 25    Has the patient been evaluated by SLP for swallowing? : Yes  Keep patient NPO?: No   General Precautions: Standard, fall, aspiration, seizure          Subjective:  "he is far from normal" per wife re cognition    Pain/Comfort  Pain Rating 1: 0/10  Pain Rating Post-Intervention 1: 0/10    Objective:   Pt. Seen while sitting up in chair and was oriented to place, year and month.  Recall of recent events was fair.  When asked to draw graphic representation of a clock, disorganization noted with poor monitoring of errors.  He orally read 4/5 sentences at midline.  On categorization/organization tasks, repetition of directions was needed with pt. Responded to category exclusion tasks in field of 4 with 60% accuracy.  He responded to word deduction tasks with 0% accuracy with perseveration noted.       Assessment:  Bessy Nunez is a 57 y.o. male with a medical diagnosis of Encephalopathy and presents with cog ling impairment  Discharge recommendations: Discharge Facility/Level Of Care Needs: rehabilitation facility     Goals:    SLP Goals        Problem: SLP Goal    Goal Priority Disciplines Outcome   SLP Goal     SLP Ongoing (interventions implemented as appropriate)   Description:  Goals to be met 6/6  1.Pt will tolerate regulart diet and thin liquids  2 pt will participate in eval of reading/writing and visual spatial deficits to determine further goals  3 Pt will be ox4 using any  Modality with min a to improve orientation  4 Pt will follow 3 step commands with 70% acc and mod a to improve receptive lang  5 Pt will complete mental manipulation task with 60% " acc and mod a to improve cognition                     Plan:   Patient to be seen Therapy Frequency: 5 x/week   Plan of Care expires: 06/29/17  Plan of Care reviewed with: patient, spouse  SLP Follow-up?: Yes  SLP - Next Visit Date: 06/01/17           Mckayla Merino MA, CCC-SLP  05/31/2017

## 2017-05-31 NOTE — ASSESSMENT & PLAN NOTE
Improving  Received 5.5g solumedrol last admission    Patient continues to complain of blurred vision  Concern for vasculitis    Plan for 1pm appointment with Dr. Mixon (ophthalmology - retina) in the ophthal clinic for eval for possible vasculitis (Susac's syndrome?  No hearing loss.).  Appreciate ophthal's assistance.

## 2017-05-31 NOTE — ASSESSMENT & PLAN NOTE
Improving    Anti-Phospholipid Ab  Cardiolipin Ab negative  Continue empiric keppra 500mg bid for now, given hx    Lyme titer (neg), WNV (neg), SSA (neg) , SSB (neg), dsDNA (neg), SS-A (neg), SS-B (neg), ESR 15, CRP 5.37, HIV neg, REYMUNDO (neg)     F/u autoimmune encephalopathy panel    24h EEG 5/26 negative for simple partial seizures / underlying etiology of waxing and waning MS

## 2017-05-31 NOTE — ASSESSMENT & PLAN NOTE
As seen on numerous MRIs of the brain.   S/p 5.5g solumedrol last admission, followed by prednisone taper as an outpatient    DDx includes MS, Susac's syndrome, vasculitis, stroke.    CSF 5/14 negative for oligoclonal bands.  Normal IgG index.  WBC 2.  Protein 93 (slightly more elevated than would be expected for a demyelinating process, but not thought to represent meningitis/encephalitis; no antibiotics or antivirals started.  Consider vasculitis.)  MRA Head and Neck negative for any significant stenosis or occlusion    Ok to hold off on GIOVANNI for now for possible stroke w/u / endocarditis causing infarcts in bilateral anterior and posterior vascular territories; will pursue vasculitis workup at this time.  VN following (and do not recommend GIOVANNI at this time).

## 2017-05-31 NOTE — PLAN OF CARE
Problem: SLP Goal  Goal: SLP Goal  Goals to be met 6/6  1.Pt will tolerate regulart diet and thin liquids  2 pt will participate in eval of reading/writing and visual spatial deficits to determine further goals  3 Pt will be ox4 using any  Modality with min a to improve orientation  4 Pt will follow 3 step commands with 70% acc and mod a to improve receptive lang  5 Pt will complete mental manipulation task with 60% acc and mod a to improve cognition   Outcome: Ongoing (interventions implemented as appropriate)  Pt. Progressing towards goals.    Mckayla Merino MA/EDMOND-SLP  Speech Language Pathologist  Pager [phone]  5/31/2017

## 2017-05-31 NOTE — ASSESSMENT & PLAN NOTE
-SSI increased to moderate and changed to q4hrs.   -Detemir 35,   -Asparte 18units preprandial stopped, as patient is NPO.   -A1C 11.4

## 2017-05-31 NOTE — PROGRESS NOTES
Pt was off the floor for clinical appointment from 12:30 until about 15:45.  I stayed with the pt the entire time, his vitals remained stable.

## 2017-05-31 NOTE — ASSESSMENT & PLAN NOTE
Sertraline 100mg discontinued this admission - consider restarting / uptitrating / changing to a different antidepressant for better symptom control.    Abulia?  (Improving.)  Numerous frontal lesions.

## 2017-05-31 NOTE — PROGRESS NOTES
Spoke with hugo clayton np . No answer from dr serna after multiple pages. Hugo spoke with vascular and wing is cancelled. Per echo fellow dr canseco, he spoke with echo staff.  Wing is not emergent and reccomending outpt. Wing for follow up. Wing is cx

## 2017-06-01 LAB
ALBUMIN SERPL BCP-MCNC: 2.4 G/DL
ALP SERPL-CCNC: 78 U/L
ALT SERPL W/O P-5'-P-CCNC: 23 U/L
ANION GAP SERPL CALC-SCNC: 10 MMOL/L
AST SERPL-CCNC: 17 U/L
BASOPHILS # BLD AUTO: 0.01 K/UL
BASOPHILS NFR BLD: 0.2 %
BILIRUB SERPL-MCNC: 0.6 MG/DL
BUN SERPL-MCNC: 12 MG/DL
CALCIUM SERPL-MCNC: 8.1 MG/DL
CHLORIDE SERPL-SCNC: 111 MMOL/L
CO2 SERPL-SCNC: 21 MMOL/L
CREAT SERPL-MCNC: 0.9 MG/DL
DIFFERENTIAL METHOD: ABNORMAL
EOSINOPHIL # BLD AUTO: 0.1 K/UL
EOSINOPHIL NFR BLD: 2.1 %
ERYTHROCYTE [DISTWIDTH] IN BLOOD BY AUTOMATED COUNT: 15.1 %
EST. GFR  (AFRICAN AMERICAN): >60 ML/MIN/1.73 M^2
EST. GFR  (NON AFRICAN AMERICAN): >60 ML/MIN/1.73 M^2
GLUCOSE SERPL-MCNC: 234 MG/DL
HCT VFR BLD AUTO: 27.8 %
HGB BLD-MCNC: 9.4 G/DL
LYMPHOCYTES # BLD AUTO: 1.5 K/UL
LYMPHOCYTES NFR BLD: 29.4 %
MAGNESIUM SERPL-MCNC: 1.5 MG/DL
MAYO MISCELLANEOUS RESULT (REF): NORMAL
MCH RBC QN AUTO: 29.5 PG
MCHC RBC AUTO-ENTMCNC: 33.8 %
MCV RBC AUTO: 87 FL
MONOCYTES # BLD AUTO: 0.4 K/UL
MONOCYTES NFR BLD: 8 %
NEUTROPHILS # BLD AUTO: 3.1 K/UL
NEUTROPHILS NFR BLD: 59.9 %
PHOSPHATE SERPL-MCNC: 2.8 MG/DL
PLATELET # BLD AUTO: 154 K/UL
PMV BLD AUTO: 8.4 FL
POCT GLUCOSE: 153 MG/DL (ref 70–110)
POCT GLUCOSE: 184 MG/DL (ref 70–110)
POCT GLUCOSE: 209 MG/DL (ref 70–110)
POCT GLUCOSE: 216 MG/DL (ref 70–110)
POCT GLUCOSE: 227 MG/DL (ref 70–110)
POCT GLUCOSE: 232 MG/DL (ref 70–110)
POCT GLUCOSE: 254 MG/DL (ref 70–110)
POCT GLUCOSE: 275 MG/DL (ref 70–110)
POTASSIUM SERPL-SCNC: 3.8 MMOL/L
PROT SERPL-MCNC: 5.3 G/DL
PS IGA SER-ACNC: <20 APS U/ML
PS IGG SER-ACNC: <11 GPS U/ML
PS IGM SER-ACNC: <25 MPS U/ML
PYRIDOXAL SERPL-MCNC: 6 UG/L (ref 5–50)
RBC # BLD AUTO: 3.19 M/UL
SODIUM SERPL-SCNC: 142 MMOL/L
WBC # BLD AUTO: 5.14 K/UL

## 2017-06-01 PROCEDURE — 99233 SBSQ HOSP IP/OBS HIGH 50: CPT | Mod: ,,, | Performed by: HOSPITALIST

## 2017-06-01 PROCEDURE — 86341 ISLET CELL ANTIBODY: CPT

## 2017-06-01 PROCEDURE — 30000890 HC MISC. SEND OUT TEST

## 2017-06-01 PROCEDURE — 86255 FLUORESCENT ANTIBODY SCREEN: CPT

## 2017-06-01 PROCEDURE — 25000003 PHARM REV CODE 250: Performed by: STUDENT IN AN ORGANIZED HEALTH CARE EDUCATION/TRAINING PROGRAM

## 2017-06-01 PROCEDURE — 83735 ASSAY OF MAGNESIUM: CPT

## 2017-06-01 PROCEDURE — 92507 TX SP LANG VOICE COMM INDIV: CPT

## 2017-06-01 PROCEDURE — 85025 COMPLETE CBC W/AUTO DIFF WBC: CPT

## 2017-06-01 PROCEDURE — 25000003 PHARM REV CODE 250: Performed by: NURSE PRACTITIONER

## 2017-06-01 PROCEDURE — 20600001 HC STEP DOWN PRIVATE ROOM

## 2017-06-01 PROCEDURE — 97164 PT RE-EVAL EST PLAN CARE: CPT

## 2017-06-01 PROCEDURE — 86519 HC MISC. SEND OUT TEST: CPT

## 2017-06-01 PROCEDURE — 80053 COMPREHEN METABOLIC PANEL: CPT

## 2017-06-01 PROCEDURE — 63600175 PHARM REV CODE 636 W HCPCS: Performed by: RADIOLOGY

## 2017-06-01 PROCEDURE — 63600175 PHARM REV CODE 636 W HCPCS: Performed by: NURSE PRACTITIONER

## 2017-06-01 PROCEDURE — C9113 INJ PANTOPRAZOLE SODIUM, VIA: HCPCS | Performed by: NURSE PRACTITIONER

## 2017-06-01 PROCEDURE — 25500020 PHARM REV CODE 255: Performed by: HOSPITALIST

## 2017-06-01 PROCEDURE — B3151ZZ FLUOROSCOPY OF BILATERAL COMMON CAROTID ARTERIES USING LOW OSMOLAR CONTRAST: ICD-10-PCS | Performed by: RADIOLOGY

## 2017-06-01 PROCEDURE — B31F1ZZ FLUOROSCOPY OF LEFT VERTEBRAL ARTERY USING LOW OSMOLAR CONTRAST: ICD-10-PCS | Performed by: RADIOLOGY

## 2017-06-01 PROCEDURE — B3181ZZ FLUOROSCOPY OF BILATERAL INTERNAL CAROTID ARTERIES USING LOW OSMOLAR CONTRAST: ICD-10-PCS | Performed by: RADIOLOGY

## 2017-06-01 PROCEDURE — 83519 RIA NONANTIBODY: CPT

## 2017-06-01 PROCEDURE — 97530 THERAPEUTIC ACTIVITIES: CPT

## 2017-06-01 PROCEDURE — 36415 COLL VENOUS BLD VENIPUNCTURE: CPT

## 2017-06-01 PROCEDURE — 97168 OT RE-EVAL EST PLAN CARE: CPT

## 2017-06-01 PROCEDURE — 63600175 PHARM REV CODE 636 W HCPCS: Performed by: PSYCHIATRY & NEUROLOGY

## 2017-06-01 PROCEDURE — 25000003 PHARM REV CODE 250: Performed by: HOSPITALIST

## 2017-06-01 PROCEDURE — 84100 ASSAY OF PHOSPHORUS: CPT

## 2017-06-01 PROCEDURE — 99233 SBSQ HOSP IP/OBS HIGH 50: CPT | Mod: ,,, | Performed by: PSYCHIATRY & NEUROLOGY

## 2017-06-01 PROCEDURE — 30000890 MAYO MISCELLANEOUS TEST (REFLEX)

## 2017-06-01 RX ORDER — IODIXANOL 320 MG/ML
200 INJECTION, SOLUTION INTRAVASCULAR
Status: COMPLETED | OUTPATIENT
Start: 2017-06-01 | End: 2017-06-01

## 2017-06-01 RX ORDER — GLUCAGON 1 MG
1 KIT INJECTION
Status: DISCONTINUED | OUTPATIENT
Start: 2017-06-01 | End: 2017-06-04 | Stop reason: HOSPADM

## 2017-06-01 RX ORDER — INSULIN ASPART 100 [IU]/ML
0-5 INJECTION, SOLUTION INTRAVENOUS; SUBCUTANEOUS EVERY 6 HOURS PRN
Status: DISCONTINUED | OUTPATIENT
Start: 2017-06-01 | End: 2017-06-04 | Stop reason: HOSPADM

## 2017-06-01 RX ORDER — SERTRALINE HYDROCHLORIDE 25 MG/1
25 TABLET, FILM COATED ORAL DAILY
Status: DISCONTINUED | OUTPATIENT
Start: 2017-06-01 | End: 2017-06-03

## 2017-06-01 RX ORDER — MIDAZOLAM HYDROCHLORIDE 1 MG/ML
INJECTION, SOLUTION INTRAMUSCULAR; INTRAVENOUS CODE/TRAUMA/SEDATION MEDICATION
Status: COMPLETED | OUTPATIENT
Start: 2017-06-01 | End: 2017-06-01

## 2017-06-01 RX ADMIN — VITAMIN D, TAB 1000IU (100/BT) 5000 UNITS: 25 TAB at 08:06

## 2017-06-01 RX ADMIN — INSULIN ASPART 10 UNITS: 100 INJECTION, SOLUTION INTRAVENOUS; SUBCUTANEOUS at 08:06

## 2017-06-01 RX ADMIN — ATORVASTATIN CALCIUM 40 MG: 20 TABLET, FILM COATED ORAL at 08:06

## 2017-06-01 RX ADMIN — INSULIN ASPART 1 UNITS: 100 INJECTION, SOLUTION INTRAVENOUS; SUBCUTANEOUS at 09:06

## 2017-06-01 RX ADMIN — PANTOPRAZOLE SODIUM 40 MG: 40 INJECTION, POWDER, FOR SOLUTION INTRAVENOUS at 08:06

## 2017-06-01 RX ADMIN — DILTIAZEM HYDROCHLORIDE 60 MG: 60 TABLET, FILM COATED ORAL at 09:06

## 2017-06-01 RX ADMIN — IODIXANOL 60 ML: 320 INJECTION, SOLUTION INTRAVASCULAR at 04:06

## 2017-06-01 RX ADMIN — LEVETIRACETAM 500 MG: 500 TABLET, FILM COATED ORAL at 08:06

## 2017-06-01 RX ADMIN — INSULIN ASPART 1 UNITS: 100 INJECTION, SOLUTION INTRAVENOUS; SUBCUTANEOUS at 01:06

## 2017-06-01 RX ADMIN — INSULIN ASPART 1 UNITS: 100 INJECTION, SOLUTION INTRAVENOUS; SUBCUTANEOUS at 05:06

## 2017-06-01 RX ADMIN — SERTRALINE HYDROCHLORIDE 25 MG: 25 TABLET ORAL at 09:06

## 2017-06-01 RX ADMIN — MIDAZOLAM 1 MG: 1 INJECTION INTRAMUSCULAR; INTRAVENOUS at 03:06

## 2017-06-01 RX ADMIN — DILTIAZEM HYDROCHLORIDE 60 MG: 60 TABLET, FILM COATED ORAL at 05:06

## 2017-06-01 NOTE — PLAN OF CARE
Problem: Patient Care Overview  Goal: Plan of Care Review  Outcome: Ongoing (interventions implemented as appropriate)  Pt awake, alert. Pt orientated with confused moments. Wife remain at bed side. VSS. Blood sugar checks maintained, coverage x1. No c/o pain. Pt up with 1 assist. Bed alarm on. Pt is to get cerebral angiogram today. Pt has no complaints. Free from injuries/falls. [facility]

## 2017-06-01 NOTE — PLAN OF CARE
Problem: Occupational Therapy Goal  Goal: Occupational Therapy Goal  Goals to be met by: 06/10/17     Patient will increase functional independence with ADLs by performing:    Feeding with Modified [place].  UE Dressing with Supervision  Dressing with SBA.  Grooming while seated with Set-up Assistance.  Toileting from bedside commode with Min Assistance for hygiene and clothing management.   Supine to sit with Supervision  Toilet transfer to bedside commode with SBA with RW.  Upper extremity exercise program x12 reps per handout, with supervision.     Outcome: Ongoing (interventions implemented as appropriate)  DU Ross/VIJAY      6/1/2017

## 2017-06-01 NOTE — PLAN OF CARE
Problem: Patient Care Overview  Goal: Plan of Care Review  Outcome: Ongoing (interventions implemented as appropriate)  Pt aaox4, bedrest, AVSS on RA. No c/o pain. Wife at bedside. accuchecks achs, insulin and diet orders revised by MD this evening. Pt's dinner tray still not arrived at shift change; night RN to recheck BG and administer mealtime insulin when tray arrives. Tele monitor on, NSR. Bed in lowest position, wheels locked, call light within reach, nonskid socks on, bed alarm set.

## 2017-06-01 NOTE — H&P
"Inpatient Radiology Pre-procedure Note    History of Present Illness:  Bessy Nunez is a 57 y.o. male who presents for diagnostic cerebral angiogram.  Patient with multifocal acute infarctions and question of vasculitis.    Admission H&P reviewed.  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 Authorizing Provider   aspirin (ECOTRIN) 81 MG EC tablet Take 81 mg by mouth once daily.   Yes Historical Provider, MD   atenolol (TENORMIN) 50 MG tablet Take 1 tablet (50 mg total) by mouth once daily. 3/22/17  Yes Edgar Nova MD   atorvastatin (LIPITOR) 40 MG tablet Take 1 tablet (40 mg total) by mouth once daily. 3/22/17  Yes Edgar Nova MD   diltiaZEM (DILACOR XR) 240 MG CDCR Take 1 capsule (240 mg total) by mouth once daily. 3/22/17  Yes Edgar Nova MD   insulin degludec (TRESIBA FLEXTOUCH U-200) 200 unit/mL (3 mL) InPn Inject 42 Units into the skin every evening. 3/22/17  Yes Promise Steele NP   insulin needles, disposable, (BD ULTRA-FINE ANA PEN NEEDLES) 32 x 5/32 " Ndle Inject twice daily 3/9/15  Yes Lisseth Rainey PA-C   liraglutide 0.6 mg/0.1 mL, 18 mg/3 mL, subq PNIJ (VICTOZA 3-TOMASZ) 0.6 mg/0.1 mL (18 mg/3 mL) PnIj Inject 1.8 mg into the skin once daily. 3/24/17 3/24/18 Yes Promise Steele NP   metformin (GLUCOPHAGE-XR) 500 MG 24 hr tablet Take 2 tablets (1,000 mg total) by mouth 2 (two) times daily with meals. 3/2/17 3/2/18 Yes " Promise Steele NP   methylPREDNISolone (MEDROL DOSEPACK) 4 mg tablet Use as directed on dose-pack -Pharmacy please specify directions for patient 5/18/17 6/8/17 Yes Elvira Hendricks MD   omega-3 fatty acids-vitamin E (FISH OIL) 1,000 mg Cap Take 1 capsule by mouth 2 (two) times daily. 3/2/17 3/2/18 Yes Promise Steele NP   sertraline (ZOLOFT) 100 MG tablet 1 and half tablet daily  Patient taking differently: Take 150 mg by mouth once daily.  3/22/17  Yes Edgar Nova MD   valsartan (DIOVAN) 320 MG tablet Take 1 tablet (320 mg total) by mouth once daily. 3/22/17 3/22/18 Yes Edgar Nova MD   vitamin D 1000 units Tab Take 5 tablets (5,000 Units total) by mouth once daily. 5/18/17  Yes Guanaco Story MD     Scheduled Meds:    atorvastatin  40 mg Oral Daily    diltiaZEM  60 mg Oral Q8H    insulin aspart  1-10 Units Subcutaneous QID (AC & HS)    insulin aspart  10 Units Subcutaneous TIDWM    insulin detemir  35 Units Subcutaneous QHS    levetiracetam  500 mg Oral BID    pantoprazole  40 mg Intravenous BID    sertraline  25 mg Oral Daily    sodium chloride 0.9%  1,000 mL Intravenous Once    vitamin D  5,000 Units Oral Daily     Continuous Infusions:    PRN Meds:acetaminophen, dextrose 50%, dextrose 50%, dextrose 50%, dextrose 50%, glucagon (human recombinant), glucagon (human recombinant), glucagon (human recombinant), glucose, glucose, insulin aspart, omnipaque  Anticoagulants/Antiplatelets: aspirin    Allergies: Review of patient's allergies indicates:  No Known Allergies  Sedation Hx: have not been any systemic reactions    Labs:  No results for input(s): INR in the last 168 hours.    Invalid input(s):  PT,  PTT    Recent Labs  Lab 06/01/17  0442   WBC 5.14  5.14  5.14   HGB 9.4*  9.4*  9.4*   HCT 27.8*  27.8*  27.8*   MCV 87  87  87     154  154      Recent Labs  Lab 06/01/17  0442   *      K 3.8   *   CO2 21*   BUN 12   CREATININE 0.9   CALCIUM 8.1*   MG 1.5*    ALT 23   AST 17   ALBUMIN 2.4*   BILITOT 0.6         Vitals:  Temp: 98.4 °F (36.9 °C) (06/01/17 1256)  Pulse: 65 (06/01/17 1256)  Resp: 16 (06/01/17 0734)  BP: (!) 115/58 (06/01/17 1256)  SpO2: 98 % (06/01/17 1256)     Physical Exam:  ASA: 3  Mallampati: 2    General: no acute distress  Mental Status: alert and oriented to person, place and time  HEENT: normocephalic, atraumatic  Chest: unlabored breathing  Heart: regular heart rate  Abdomen: nondistended  Extremity: moves all extremities    Plan: diagnostic cerebral angiogram  Sedation Plan: moderate    Guillaume Burgos MD  Department of Radiology  [phone]

## 2017-06-01 NOTE — PT/OT/SLP PROGRESS
"Speech Language Pathology  Treatment    Bessy Nunez   MRN: [id number]   1034/1034 A    Admitting Diagnosis: Encephalopathy    Diet recommendations: Solid Diet Level: Regular  Liquid Diet Level: Thin universal aspiration precautions.     SLP Treatment Date: 06/01/17  Speech Start Time: 1007     Speech Stop Time: 1033     Speech Total (min): 26 min       TREATMENT BILLABLE MINUTES:  Speech Therapy Individual 26    Has the patient been evaluated by SLP for swallowing? : Yes  Keep patient NPO?: No   General Precautions: Standard, fall, aspiration, seizure          Subjective:  Pt and pt's wife expressing agitation with hospital stay. Pt seated in chair at side of bed. Pt's wife reported, "Just to let you know, pt takes Zoloft and they have not been giving it to him."     Pain/Comfort  Pain Rating 1: 0/10    Objective:   Unable to assess tolerance of diet 2/2 pt NPO for angiogram today. Pt and pt's wife report no difficulties or s/s of aspiration noted during meals.     SLP recommended [employer] armando or similar apps for pt's phone to play free "brain games" as a way to independently work on cognitive-linguistic skills. SLP wrote the name of the armando down.   Pt named 1 similarity between two words with 70% accuracy and 1 difference with 60% accuracy. Pt required frequent redirection to task and repetitions of 2 words.   Pt not oriented to current month/season. SLP cued pt to utilize white board to indicate month. Pt correctly stated season when provided a month. Pt identified months given holidays. Pt oriented to birthday, however, not oriented to correct age.   Pt required cues to recall cell phone number. Pt recalled house number, address, son's age, son's location, and son's occupation.   Pt identified word given description with 100% accuracy.   Pt with difficulty following complex commands. May be negatively impacted by impaired sustained attention and requiring frequent redirection to tasks and repetitions of " instructions.   Pt identified the category given 3 words with 100% accuracy.  Pt completed simple money and time management tasks with 70% accuracy given MOD cues.     Assessment:  Bessy Nunez is a 57 y.o. male with a medical diagnosis of Encephalopathy and presents with impaired cognitive-linguistic skills. ST will continue to follow.     Discharge recommendations: Discharge Facility/Level Of Care Needs: rehabilitation facility     Goals:    SLP Goals        Problem: SLP Goal    Goal Priority Disciplines Outcome   SLP Goal     SLP Ongoing (interventions implemented as appropriate)   Description:  Goals to be met 6/6  1.Pt will tolerate regulart diet and thin liquids  2 pt will participate in eval of reading/writing and visual spatial deficits to determine further goals  3 Pt will be ox4 using any  Modality with min a to improve orientation  4 Pt will follow 3 step commands with 70% acc and mod a to improve receptive lang  5 Pt will complete mental manipulation task with 60% acc and mod a to improve cognition                     Plan:   Patient to be seen Therapy Frequency: 5 x/week   Plan of Care expires: 06/29/17  Plan of Care reviewed with: patient, spouse  SLP Follow-up?: Yes  SLP - Next Visit Date: 06/01/17           JULIO Walters, CCC-SLP  06/01/2017

## 2017-06-01 NOTE — PROGRESS NOTES
[facility]  Neurology  Progress Note    Patient Name: Bessy Nunez  MRN: [id number]  Admission Date: 5/23/2017  Hospital Length of Stay: 9 days  Code Status: Full Code   Attending Provider: Andrei Cornelius MD  Primary Care Physician: Edgar Nova MD   Principal Problem:Encephalopathy      Subjective:     Interval History: No acute events overnight     Current Neurological Medications: Keppra     Current Facility-Administered Medications   Medication Dose Route Frequency Provider Last Rate Last Dose    acetaminophen tablet 650 mg  650 mg Oral Q6H PRN Gilberto Díaz MD   650 mg at 05/28/17 1243    atorvastatin tablet 40 mg  40 mg Oral Daily Donna Jason MD   40 mg at 06/01/17 0848    dextrose 50% injection 12.5 g  12.5 g Intravenous PRN Donna Jason MD   12.5 g at 05/28/17 0936    dextrose 50% injection 12.5 g  12.5 g Intravenous PRN William French NP        dextrose 50% injection 12.5 g  12.5 g Intravenous PRN Cristina Harp MD        dextrose 50% injection 25 g  25 g Intravenous PRN Donna Jason MD        diltiaZEM tablet 60 mg  60 mg Oral Q8H Guanaco Story MD   60 mg at 06/01/17 0558    glucagon (human recombinant) injection 1 mg  1 mg Intramuscular PRN Donna Jason MD        glucagon (human recombinant) injection 1 mg  1 mg Intramuscular PRN William French NP        glucagon (human recombinant) injection 1 mg  1 mg Intramuscular PRN Cristina Harp MD        glucose chewable tablet 16 g  16 g Oral PRN Donna Jason MD        glucose chewable tablet 24 g  24 g Oral PRN Donna Jason MD        insulin aspart pen 0-5 Units  0-5 Units Subcutaneous Q6H PRN Cristina Harp MD   1 Units at 06/01/17 0558    insulin aspart pen 1-10 Units  1-10 Units Subcutaneous QID (AC & HS) William Fernch NP        insulin aspart pen 10 Units  10 Units Subcutaneous TIDWM William French NP   10 Units  at 05/31/17 2019    insulin detemir pen 35 Units  35 Units Subcutaneous QHS Guanaco Story MD   35 Units at 05/31/17 2018    levetiracetam tablet 500 mg  500 mg Oral BID William French NP   500 mg at 06/01/17 0848    omnipaque oral solution 15 mL  15 mL Oral PRN Horace Hernandez MD        pantoprazole injection 40 mg  40 mg Intravenous BID William French NP   40 mg at 06/01/17 0848    sertraline tablet 25 mg  25 mg Oral Daily Andrei Cornelius MD   25 mg at 06/01/17 0923    sodium chloride 0.9% bolus 1,000 mL  1,000 mL Intravenous Once Guanaco Story MD        vitamin D 1000 units tablet 5,000 Units  5,000 Units Oral Daily Guanaco Story MD   5,000 Units at 06/01/17 0848       Review of Systems   Constitutional: Positive for activity change.   Eyes: Positive for visual disturbance.   Respiratory: Negative for shortness of breath.    Cardiovascular: Negative for chest pain.   Gastrointestinal: Negative for nausea and vomiting.   Skin: Negative for rash and wound.   Psychiatric/Behavioral: Positive for confusion. Negative for agitation and behavioral problems.     Objective:     Vital Signs (Most Recent):  Temp: 98.4 °F (36.9 °C) (06/01/17 1256)  Pulse: 65 (06/01/17 1645)  Resp: 18 (06/01/17 1645)  BP: (!) 153/76 (06/01/17 1645)  SpO2: 100 % (06/01/17 1645) Vital Signs (24h Range):  Temp:  [97.6 °F (36.4 °C)-98.7 °F (37.1 °C)] 98.4 °F (36.9 °C)  Pulse:  [44-78] 65  Resp:  [8-18] 18  SpO2:  [96 %-100 %] 100 %  BP: (115-162)/(58-90) 153/76     Weight: 95.1 kg (209 lb 10.5 oz)  Body mass index is 29.24 kg/m².    Physical Exam   Constitutional: He appears well-developed and well-nourished. No distress.   HENT:   Head: Normocephalic and atraumatic.   Pulmonary/Chest: Effort normal.   Neurological: He is alert. He has a normal Finger-Nose-Finger Test.   Reflex Scores:       Bicep reflexes are 1+ on the right side and 1+ on the left side.       Brachioradialis reflexes are 1+ on the right side  and 1+ on the left side.       Patellar reflexes are 1+ on the right side and 1+ on the left side.  Skin: Skin is warm and dry. He is not diaphoretic.   Psychiatric: His speech is normal.   More interactive today.   Nursing note and vitals reviewed.      NEUROLOGICAL EXAMINATION:     MENTAL STATUS   Attention: decreased. Concentration: normal.   Speech: speech is normal   Level of consciousness: alert       Able to spell WORLD forwards but not backwards.  Cannot subtract 7 from 100.     CRANIAL NERVES     CN II   Visual fields full to confrontation.     CN V   Facial sensation intact.     CN VII   Facial expression full, symmetric.     CN VIII   Hearing: intact    CN XI   Right sternocleidomastoid strength: normal  Left sternocleidomastoid strength: normal    CN XII   Tongue: not atrophic  Fasciculations: absent  Tongue deviation: none       Mild L JAYDEN.  Improving.     MOTOR EXAM   Muscle bulk: normal  Overall muscle tone: normal    Strength   Right biceps: 5/5  Left biceps: 5/5  Right triceps: 5/5  Left triceps: 5/5  Right quadriceps: 5/5  Left quadriceps: 4/5       Weaker on LLE than RLE.       REFLEXES     Reflexes   Right brachioradialis: 1+  Left brachioradialis: 1+  Right biceps: 1+  Left biceps: 1+  Right patellar: 1+  Left patellar: 1+    SENSORY EXAM   Light touch normal.     GAIT AND COORDINATION      Coordination   Finger to nose coordination: normal      Significant Labs:   Hemoglobin A1c:     Recent Labs  Lab 05/13/17  0138   HGBA1C 11.4*     Blood Culture:   No results for input(s): LABBLOO in the last 48 hours.  BMP:     Recent Labs  Lab 05/31/17 0257 06/01/17  0442   * 234*    142   K 3.8 3.8   * 111*   CO2 20* 21*   BUN 10 12   CREATININE 1.0 0.9   CALCIUM 8.3* 8.1*   MG 1.8 1.5*     CBC:     Recent Labs  Lab 05/31/17  0257 06/01/17  0442   WBC 5.38  5.38  5.38 5.14  5.14  5.14   HGB 9.7*  9.7*  9.7* 9.4*  9.4*  9.4*   HCT 28.2*  28.2*  28.2* 27.8*  27.8*  27.8*   PLT  142*  142*  142* 154  154  154     CMP:     Recent Labs  Lab 05/31/17  0257 06/01/17  0442   * 234*    142   K 3.8 3.8   * 111*   CO2 20* 21*   BUN 10 12   CREATININE 1.0 0.9   CALCIUM 8.3* 8.1*   MG 1.8 1.5*   PROT 5.3* 5.3*   ALBUMIN 2.4* 2.4*   BILITOT 0.5 0.6   ALKPHOS 86 78   AST 22 17   ALT 26 23   ANIONGAP 8 10   EGFRNONAA >60.0 >60.0     CSF Culture: No results for input(s): CSFCULTURE in the last 48 hours.  CSF Studies: No results for input(s): ALIQUT, APPEARCSF, COLORCSF, CSFWBC, CSFRBC, GLUCCSF, LDHCSF, PROTEINCSF, VDRLCSF in the last 48 hours.  Inflammatory Markers: No results for input(s): SEDRATE, CRP, PROCAL in the last 48 hours.  POCT Glucose:     Recent Labs  Lab 06/01/17  0002 06/01/17  0556 06/01/17  1135   POCTGLUCOSE 232* 254* 227*     Respiratory Culture: No results for input(s): GSRESP, RESPIRATORYC in the last 48 hours.  Urine Culture: No results for input(s): LABURIN in the last 48 hours.  Urine Studies: No results for input(s): COLORU, APPEARANCEUA, PHUR, SPECGRAV, PROTEINUA, GLUCUA, KETONESU, BILIRUBINUA, OCCULTUA, NITRITE, UROBILINOGEN, LEUKOCYTESUR, RBCUA, WBCUA, BACTERIA, SQUAMEPITHEL, HYALINECASTS in the last 48 hours.    Invalid input(s): WRIGHTSUR  Recent Lab Results       06/01/17  1135 06/01/17  0912 06/01/17  0556 06/01/17  0442 06/01/17  0002      Albumin    2.4(L)      Alkaline Phosphatase    78      ALT    23      Anion Gap    10      AST    17      Baso #    0.01      Basophil%    0.2      Total Bilirubin    0.6  Comment:  For infants and newborns, interpretation of results should be based  on gestational age, weight and in agreement with clinical  observations.  Premature Infant recommended reference ranges:  Up to 24 hours.............<8.0 mg/dL  Up to 48 hours............<12.0 mg/dL  3-5 days..................<15.0 mg/dL  6-29 days.................<15.0 mg/dL        BUN, Bld    12      Calcium    8.1(L)      Chloride    111(H)      CO2    21(L)       Creatinine    0.9      Differential Method    Automated      eGFR if     >60.0      eGFR if non     >60.0  Comment:  Calculation used to obtain the estimated glomerular filtration  rate (eGFR) is the CKD-EPI equation. Since race is unknown   in our information system, the eGFR values for   -American and Non--American patients are given   for each creatinine result.        Eos #    0.1      Eosinophil%    2.1      Glucose    234(H)      Gran #    3.1      Gran%    59.9      Hematocrit    27.8(L)          27.8(L)          27.8(L)      Hemoglobin    9.4(L)          9.4(L)          9.4(L)      Lymph #    1.5      Lymph%    29.4      Magnesium    1.5(L)      [place] Miscellaneous Result  SEE COMMENTS  Comment:  Test                           Result   Flag  Unit    RefValue  --------------------------------------------------------------  Encephalopathy-Autoimmune Eval, S  Encephalopathy,  Interpretation, S  No informative autoantibodies were detected in this   evaluation. However, a negative result does not exclude   autoimmune encephalopathy, idiopathic or paraneoplastic.   Sensitivity and specificity of antibody testing are   enhanced by testing both serum and CSF.  NMDA-R Ab CBA, S             Negative               Negative  -------------------ADDITIONAL INFORMATION-------------------  This test was developed and its performance characteristics   determined by [facility] in a manner consistent with CLIA   requirements. This test has not been cleared or approved by   the U.S. Food and Drug Administration.  Neuronal (V-G) K+ Channel    0.00           nmol/L  <=0.02  Ab, S  -------------------ADDITIONAL INFORMATION-------------------  This test was developed and its performance characteristics   determined by [facility] in a manner consistent with CLIA   requirements. This test has not been cleared or approved by   the U.S. Food and Drug Administration.  LGI1-IgG CBA, S               Negative               Negative  -------------------ADDITIONAL INFORMATION-------------------  This test was developed and its performance characteristics   determined by [facility] in a manner consistent with CLIA   requirements. This test has not been cleared or approved by   the U.S. Food and Drug Administration.  CASPR2-IgG CBA, S            Negative               Negative  -------------------ADDITIONAL INFORMATION-------------------  This test was developed and its performance characteristics   determined by [facility] in a manner consistent with CLIA   requirements. This test has not been cleared or approved by   the U.S. Food and Drug Administration.  GAD65 Ab Assay, S            0.00           nmol/L  <= 0.02   -------------------ADDITIONAL INFORMATION-------------------  This test was developed and its performance characteristics   determined by [facility] in a manner consistent with CLIA   requirements. This test has not been cleared or approved by   the U.S. Food and Drug Administration.  JAKUB-B-R Ab CBA, S           Negative               Negative  -------------------ADDITIONAL INFORMATION-------------------  This test was developed and its performance characteristics   determined by [facility] in a manner consistent with CLIA   requirements. This test has not been cleared or approved by   the U.S. Food and Drug Administration.  AMPA-R Ab CBA, S             Negative               Negative  -------------------ADDITIONAL INFORMATION-------------------  This test was developed and its performance characteristics   determined by [facility] in a manner consistent with CLIA   requirements. This test has not been cleared or approved by   the U.S. Food and Drug Administration.  ALL-1, S                    Negative       titer   <1:240    Reflex Added                 None.                            -------------------ADDITIONAL INFORMATION-------------------  This test was developed and its  performance characteristics   determined by [facility] in a manner consistent with CLIA   requirements. This test has not been cleared or approved by   the U.S. Food and Drug Administration.  ALL-2, S                    Negative       titer   <1:240    -------------------ADDITIONAL INFORMATION-------------------  This test was developed and its performance characteristics   determined by [facility] in a manner consistent with CLIA   requirements. This test has not been cleared or approved by   the U.S. Food and Drug Administration.  ALL-3, S                    Negative       titer   <1:240    -------------------ADDITIONAL INFORMATION-------------------  This test was developed and its performance characteristics   determined by [facility] in a manner consistent with CLIA   requirements. This test has not been cleared or approved by   the U.S. Food and Drug Administration.  AGNA-1, S                    Negative       titer   <1:240    -------------------ADDITIONAL INFORMATION-------------------  This test was developed and its performance characteristics   determined by [facility] in a manner consistent with CLIA   requirements. This test has not been cleared or approved by   the U.S. Food and Drug Administration.  PCA-1, S                     Negative       titer   <1:240    -------------------ADDITIONAL INFORMATION-------------------  This test was developed and its performance characteristics   determined by [facility] in a manner consistent with CLIA   requirements. This test has not been cleared or approved by   the U.S. Food and Drug Administration.  PCA-2, S                     Negative       titer   <1:240    -------------------ADDITIONAL INFORMATION-------------------  This test was developed and its performance characteristics   determined by [facility] in a manner consistent with CLIA   requirements. This test has not been cleared or approved by   the U.S. Food and Drug Administration.  PCA-Tr, S                     Negative       titer   <1:240    -------------------ADDITIONAL INFORMATION-------------------  This test was developed and its performance characteristics   determined by [facility] in a manner consistent with CLIA   requirements. This test has not been cleared or approved by   the U.S. Food and Drug Administration.  Amphiphysin Ab, S            Negative       titer   <1:240    -------------------ADDITIONAL INFORMATION-------------------  This test was developed and its performance characteristics   determined by [facility] in a manner consistent with CLIA   requirements. This test has not been cleared or approved by   the U.S. Food and Drug Administration.  N-Type Calcium Channel Ab    0.00           nmol/L  <=0.03    -------------------ADDITIONAL INFORMATION-------------------  This test was developed and its performance characteristics   determined by [facility] in a manner consistent with CLIA   requirements. This test has not been cleared or approved by   the U.S. Food and Drug Administration.  P/Q-Type Calcium Channel Ab  0.00           nmol/L  <=0.02    -------------------ADDITIONAL INFORMATION-------------------  This test was developed and its performance characteristics   determined by [facility] in a manner consistent with CLIA   requirements. This test has not been cleared or approved by   the U.S. Food and Drug Administration.  ACh Receptor (Muscle)        0.00           nmol/L  <=0.02  Binding Ab  -------------------ADDITIONAL INFORMATION-------------------  This test was developed and its performance characteristics   determined by [facility] in a manner consistent with CLIA   requirements. This test has not been cleared or approved by   the U.S. Food and Drug Administration.  AChR Ganglionic Neuronal     0.00           nmol/L  <=0.02  Ab, S  -------------------ADDITIONAL INFORMATION-------------------  This test was developed and its performance characteristics   determined by  [facility] in a manner consistent with CLIA   requirements. This test has not been cleared or approved by   the U.S. Food and Drug Administration.  CRMP-5-IgG, S                Negative       titer   <1:240    -------------------ADDITIONAL INFORMATION-------------------  This test was developed and its performance characteristics   determined by [facility] in a manner consistent with CLIA   requirements. This test has not been cleared or approved by   the U.S. Food and Drug Administration.  Test Performed by:  [facility] - [address] [address]          MCH    29.5          29.5          29.5      MCHC    33.8          33.8          33.8      MCV    87          87          87      Mono #    0.4      Mono%    8.0      MPV    8.4(L)          8.4(L)          8.4(L)      Phosphorus    2.8      Platelets    154          154          154      POCT Glucose 227(H)  254(H)  232(H)     Potassium    3.8      Total Protein    5.3(L)      RBC    3.19(L)          3.19(L)          3.19(L)      RDW    15.1(H)          15.1(H)          15.1(H)      Sodium    142      WBC    5.14          5.14          5.14                  05/31/17 2015 05/31/17  1929 05/31/17  1812      Albumin        Alkaline Phosphatase        ALT        Anion Gap        AST        Baso #        Basophil%        Total Bilirubin        BUN, Bld        Calcium        Chloride        CO2        Creatinine        Differential Method        eGFR if         eGFR if non         Eos #        Eosinophil%        Glucose        Gran #        Gran%        Hematocrit        Hemoglobin        Lymph #        Lymph%        Magnesium        [place] Miscellaneous Result        MCH        MCHC        MCV        Mono #        Mono%        MPV        Phosphorus        Platelets        POCT Glucose 271(H) 250(H) 294(H)     Potassium        Total Protein        RBC        RDW        Sodium         WBC              Significant Imaging:  MRI brain 5/13/17:  Multiple foci of FLAIR hyperintensity in the deep cerebral periventricular white matter in a configuration and distribution which can be seen in the setting of underlying demyelinating process such as multiple sclerosis. There are several punctate foci of restricted diffusion including the left aspect of the midbrain tectum which in light of the aforementioned white matter changes may reflect regions of recent demyelination. Superimposed small acute infarcts would be difficult to exclude, although are felt less likely given the overall constellation of findings. Additionally, there is a 0.7 cm enhancing T2/FLAIR identified in the anterior right thalamus concerning for focus of active demyelination.           Also old lesions in corpus collosum         CTH 5/26/17  No detrimental change in this patient with volume loss, and multiple areas of hyperattenuation consistent with a infarcts, and periventricular hypoattenuation likely related to the patient's demyelinating disease.    MRI Brain 5/26/17  Please note evaluation is markedly limited by motion artifact, and the patient could not complete the exam.  However, the limited diffusion MRI findings are most compatible with acute infarction involving the right frontal and right temporal lobes as well as the left cerebellar hemisphere and left brainstem, as above.  No evidence of intracranial hemorrhage.    MRI Brain 5/28/17   Small to punctate-sized scattered foci of signal abnormality cerebral hemispheres, left cerebellum and brainstem similarly seen on the prior allowing for motion limitation.  This is superimposed on confluent regions of T2 flair signal abnormality supratentorial white matter and jose in light of corpus callosal involvement most suggestive for areas of recent or active demyelination superimposed on prior demyelination.  However alternative differential including areas of infarction with  remote lacunar-type infarcts and age advanced chronic microvascular ischemic changes not excluded.    There is a small focus enhancement in the left dorsal jose differential to include but not limited to active demyelination versus subacute infarction.    Allowing for motion compared to prior there is no significant interval change.  No hydrocephalus or midline shift.        Assessment and Plan:     Fall    Suspected seizure  Continue keppra 500mg bid  EEG negative for seizure; generalized slowing        Diplopia    Improving        Leukocytosis    Resolved.  Likely 2/2 steroids        Demyelinating changes in brain    As seen on numerous MRIs of the brain.   S/p 5.5g solumedrol last admission, followed by prednisone taper as an outpatient    DDx includes MS, Susac's syndrome, vasculitis, stroke.    CSF 5/14 negative for oligoclonal bands.  Normal IgG index.  WBC 2.  Protein 93 (slightly more elevated than would be expected for a demyelinating process, but not thought to represent meningitis/encephalitis; no antibiotics or antivirals started.  Consider vasculitis.)  MRA Head and Neck negative for any significant stenosis or occlusion    Ok to hold off on GIOVANNI for now for possible stroke w/u / endocarditis causing infarcts in bilateral anterior and posterior vascular territories; will pursue vasculitis workup at this time.  VN following (and do not recommend GIOVANNI at this time).        Internuclear ophthalmoplegia of left eye    Improving  Received 5.5g solumedrol last admission    Patient continues to complain of blurred vision  Concern for vasculitis    Plan for 1pm appointment with Dr. Mixon (ophthalmology - retina) in the ophthal clinic for eval for possible vasculitis (Susac's syndrome?  No hearing loss.).  Appreciate ophthal's assistance.        Depressive disorder, not elsewhere classified    Sertraline 100mg discontinued this admission - consider restarting / uptitrating / changing to a different  antidepressant for better symptom control.    Abulia?  (Improving.)  Numerous frontal lesions.        * Encephalopathy    Improving    Anti-Phospholipid Ab  Cardiolipin Ab negative  Continue empiric keppra 500mg bid for now, given hx    Lyme titer (neg), WNV (neg), SSA (neg) , SSB (neg), dsDNA (neg), SS-A (neg), SS-B (neg), ESR 15, CRP 5.37, HIV neg, REYMUNDO (neg)     F/u autoimmune encephalopathy panel    24h EEG 5/26 negative for simple partial seizures / underlying etiology of waxing and waning MS          Assessment and Plan:    Fall     Suspected seizure  Continue keppra 500mg bid  EEG negative for seizure; generalized slowing       Diplopia     Improving                Demyelinating changes in brain     As seen on numerous MRIs of the brain.   S/p 5.5g solumedrol last admission, followed by prednisone taper as an outpatient     DDx includes MS, Susac's syndrome, vasculitis, stroke.    CSF 5/14 negative for oligoclonal bands.  Normal IgG index.  WBC 2.  Protein 93 (slightly more elevated than would be expected for a demyelinating process, but not thought to represent meningitis/encephalitis; no antibiotics or antivirals started.  Consider vasculitis.)  MRA Head and Neck negative for any significant stenosis or occlusion     Ok to hold off on GIOVANNI for now for possible stroke w/u / endocarditis causing infarcts in bilateral anterior and posterior vascular territories; will pursue vasculitis workup at this time.  VN following (and do not recommend GIOVANNI at this time).    Follow angiogram today          Internuclear ophthalmoplegia of left eye     Improving  Received 5.5g solumedrol last admission     Patient continues to complain of blurred vision  Concern for vasculitis     Plan for 1pm appointment with Dr. Mixon (ophthalmology - retina) in the ophthal clinic for eval for possible vasculitis (Susac's syndrome?  No hearing loss.).  Appreciate ophthal's assistance.       Depressive disorder, not elsewhere classified      Restart Sertraline 100 mg     Abulia?  (Improving.)  Numerous frontal lesions.       * Encephalopathy     Improving     Anti-Phospholipid Ab  Cardiolipin Ab negative  Continue empiric keppra 500mg bid for now, given hx     Lyme titer (neg), WNV (neg), SSA (neg) , SSB (neg), dsDNA (neg), SS-A (neg), SS-B (neg), ESR 15, CRP 5.37, HIV neg, REYMUNDO (neg)     F/u autoimmune encephalopathy panel     24h EEG 5/26 negative for simple partial seizures / underlying etiology of waxing and waning MS         VTE Risk Mitigation         Ordered     Place sequential compression device  Until discontinued      05/24/17 0303     Medium Risk of VTE  Once      05/24/17 0303          Saima Olguin II, MD  Neurology  [facility]

## 2017-06-01 NOTE — PROGRESS NOTES
Pt arrives to [facility], report received per Birdie ORTIZ. VS obtained. Pt resting comfortably at this time.

## 2017-06-01 NOTE — PLAN OF CARE
Problem: Physical Therapy Goal  Goal: Physical Therapy Goal  Goals to be met by: 2017     Patient will increase functional independence with mobility by performin. Sit to stand transfer with Supervision  2. Gait  x 250 feet with Supervision using Rolling Walker.   3. Ascend/descend 12 stair with right Handrails Supervision.   4. Lower extremity exercise program x15 reps per handout, with supervision     Outcome: Ongoing (interventions implemented as appropriate)  Pt re-evaluation complete. Pt goal re-assessed.     ANNETTE FRANKLIN, PT  2017

## 2017-06-01 NOTE — SUBJECTIVE & OBJECTIVE
Subjective:     Interval History: Per patient's wife, patient's MS best today than it has been all week.  Plan for ophthal eval today.    Current Neurological Medications: Keppra     Current Facility-Administered Medications   Medication Dose Route Frequency Provider Last Rate Last Dose    acetaminophen tablet 650 mg  650 mg Oral Q6H PRN Gilberto Díaz MD   650 mg at 05/28/17 1243    atorvastatin tablet 40 mg  40 mg Oral Daily Donna Jason MD   40 mg at 06/01/17 0848    dextrose 50% injection 12.5 g  12.5 g Intravenous PRN Donna Jason MD   12.5 g at 05/28/17 0936    dextrose 50% injection 12.5 g  12.5 g Intravenous PRN William French NP        dextrose 50% injection 12.5 g  12.5 g Intravenous PRN Cristina Harp MD        dextrose 50% injection 25 g  25 g Intravenous PRN Donna Jason MD        diltiaZEM tablet 60 mg  60 mg Oral Q8H Guanaco Story MD   60 mg at 06/01/17 0558    glucagon (human recombinant) injection 1 mg  1 mg Intramuscular PRN Donna Jason MD        glucagon (human recombinant) injection 1 mg  1 mg Intramuscular PRN William French NP        glucagon (human recombinant) injection 1 mg  1 mg Intramuscular PRN Cristina Harp MD        glucose chewable tablet 16 g  16 g Oral PRN Donna Jason MD        glucose chewable tablet 24 g  24 g Oral PRN Donna Jason MD        insulin aspart pen 0-5 Units  0-5 Units Subcutaneous Q6H PRN Cristina Harp MD   1 Units at 06/01/17 0558    insulin aspart pen 1-10 Units  1-10 Units Subcutaneous QID (AC & HS) William French NP        insulin aspart pen 10 Units  10 Units Subcutaneous TIDWM William French NP   10 Units at 05/31/17 2019    insulin detemir pen 35 Units  35 Units Subcutaneous QHS Guanaco Story MD   35 Units at 05/31/17 2018    levetiracetam tablet 500 mg  500 mg Oral BID William French NP   500 mg at 06/01/17 0848     omnipaque oral solution 15 mL  15 mL Oral PRN Horace Hernandez MD        pantoprazole injection 40 mg  40 mg Intravenous BID William French NP   40 mg at 06/01/17 0848    sertraline tablet 25 mg  25 mg Oral Daily Andrei Cornelius MD   25 mg at 06/01/17 0923    sodium chloride 0.9% bolus 1,000 mL  1,000 mL Intravenous Once Guanaco Story MD        vitamin D 1000 units tablet 5,000 Units  5,000 Units Oral Daily Guanaco Story MD   5,000 Units at 06/01/17 0848       Review of Systems   Constitutional: Positive for activity change.   Eyes: Positive for visual disturbance.   Respiratory: Negative for shortness of breath.    Cardiovascular: Negative for chest pain.   Gastrointestinal: Negative for nausea and vomiting.   Skin: Negative for rash and wound.   Psychiatric/Behavioral: Positive for confusion. Negative for agitation and behavioral problems.     Objective:     Vital Signs (Most Recent):  Temp: 98.4 °F (36.9 °C) (06/01/17 1256)  Pulse: 65 (06/01/17 1645)  Resp: 18 (06/01/17 1645)  BP: (!) 153/76 (06/01/17 1645)  SpO2: 100 % (06/01/17 1645) Vital Signs (24h Range):  Temp:  [97.6 °F (36.4 °C)-98.7 °F (37.1 °C)] 98.4 °F (36.9 °C)  Pulse:  [44-78] 65  Resp:  [8-18] 18  SpO2:  [96 %-100 %] 100 %  BP: (115-162)/(58-90) 153/76     Weight: 95.1 kg (209 lb 10.5 oz)  Body mass index is 29.24 kg/m².    Physical Exam   Constitutional: He appears well-developed and well-nourished. No distress.   HENT:   Head: Normocephalic and atraumatic.   Pulmonary/Chest: Effort normal.   Neurological: He is alert. He has a normal Finger-Nose-Finger Test.   Reflex Scores:       Bicep reflexes are 1+ on the right side and 1+ on the left side.       Brachioradialis reflexes are 1+ on the right side and 1+ on the left side.       Patellar reflexes are 1+ on the right side and 1+ on the left side.  Skin: Skin is warm and dry. He is not diaphoretic.   Psychiatric: His speech is normal.   More interactive today.   Nursing note  and vitals reviewed.      NEUROLOGICAL EXAMINATION:     MENTAL STATUS   Attention: decreased. Concentration: normal.   Speech: speech is normal   Level of consciousness: alert       Able to spell WORLD forwards but not backwards.  Cannot subtract 7 from 100.     CRANIAL NERVES     CN II   Visual fields full to confrontation.     CN V   Facial sensation intact.     CN VII   Facial expression full, symmetric.     CN VIII   Hearing: intact    CN XI   Right sternocleidomastoid strength: normal  Left sternocleidomastoid strength: normal    CN XII   Tongue: not atrophic  Fasciculations: absent  Tongue deviation: none       Mild L JAYDEN.  Improving.     MOTOR EXAM   Muscle bulk: normal  Overall muscle tone: normal    Strength   Right biceps: 5/5  Left biceps: 5/5  Right triceps: 5/5  Left triceps: 5/5  Right quadriceps: 5/5  Left quadriceps: 4/5       Weaker on LLE than RLE.       REFLEXES     Reflexes   Right brachioradialis: 1+  Left brachioradialis: 1+  Right biceps: 1+  Left biceps: 1+  Right patellar: 1+  Left patellar: 1+    SENSORY EXAM   Light touch normal.     GAIT AND COORDINATION      Coordination   Finger to nose coordination: normal      Significant Labs:   Hemoglobin A1c:     Recent Labs  Lab 05/13/17  0138   HGBA1C 11.4*     Blood Culture:   No results for input(s): LABBLOO in the last 48 hours.  BMP:     Recent Labs  Lab 05/31/17 0257 06/01/17  0442   * 234*    142   K 3.8 3.8   * 111*   CO2 20* 21*   BUN 10 12   CREATININE 1.0 0.9   CALCIUM 8.3* 8.1*   MG 1.8 1.5*     CBC:     Recent Labs  Lab 05/31/17 0257 06/01/17  0442   WBC 5.38  5.38  5.38 5.14  5.14  5.14   HGB 9.7*  9.7*  9.7* 9.4*  9.4*  9.4*   HCT 28.2*  28.2*  28.2* 27.8*  27.8*  27.8*   *  142*  142* 154  154  154     CMP:     Recent Labs  Lab 05/31/17 0257 06/01/17  0442   * 234*    142   K 3.8 3.8   * 111*   CO2 20* 21*   BUN 10 12   CREATININE 1.0 0.9   CALCIUM 8.3* 8.1*   MG 1.8  1.5*   PROT 5.3* 5.3*   ALBUMIN 2.4* 2.4*   BILITOT 0.5 0.6   ALKPHOS 86 78   AST 22 17   ALT 26 23   ANIONGAP 8 10   EGFRNONAA >60.0 >60.0     CSF Culture: No results for input(s): CSFCULTURE in the last 48 hours.  CSF Studies: No results for input(s): ALIQUT, APPEARCSF, COLORCSF, CSFWBC, CSFRBC, GLUCCSF, LDHCSF, PROTEINCSF, VDRLCSF in the last 48 hours.  Inflammatory Markers: No results for input(s): SEDRATE, CRP, PROCAL in the last 48 hours.  POCT Glucose:     Recent Labs  Lab 06/01/17  0002 06/01/17  0556 06/01/17  1135   POCTGLUCOSE 232* 254* 227*     Respiratory Culture: No results for input(s): GSRESP, RESPIRATORYC in the last 48 hours.  Urine Culture: No results for input(s): LABURIN in the last 48 hours.  Urine Studies: No results for input(s): COLORU, APPEARANCEUA, PHUR, SPECGRAV, PROTEINUA, GLUCUA, KETONESU, BILIRUBINUA, OCCULTUA, NITRITE, UROBILINOGEN, LEUKOCYTESUR, RBCUA, WBCUA, BACTERIA, SQUAMEPITHEL, HYALINECASTS in the last 48 hours.    Invalid input(s): WRIGHTSUR  Recent Lab Results       06/01/17  1135 06/01/17  0912 06/01/17  0556 06/01/17  0442 06/01/17  0002      Albumin    2.4(L)      Alkaline Phosphatase    78      ALT    23      Anion Gap    10      AST    17      Baso #    0.01      Basophil%    0.2      Total Bilirubin    0.6  Comment:  For infants and newborns, interpretation of results should be based  on gestational age, weight and in agreement with clinical  observations.  Premature Infant recommended reference ranges:  Up to 24 hours.............<8.0 mg/dL  Up to 48 hours............<12.0 mg/dL  3-5 days..................<15.0 mg/dL  6-29 days.................<15.0 mg/dL        BUN, Bld    12      Calcium    8.1(L)      Chloride    111(H)      CO2    21(L)      Creatinine    0.9      Differential Method    Automated      eGFR if     >60.0      eGFR if non     >60.0  Comment:  Calculation used to obtain the estimated glomerular filtration  rate (eGFR) is  the CKD-EPI equation. Since race is unknown   in our information system, the eGFR values for   -American and Non--American patients are given   for each creatinine result.        Eos #    0.1      Eosinophil%    2.1      Glucose    234(H)      Gran #    3.1      Gran%    59.9      Hematocrit    27.8(L)          27.8(L)          27.8(L)      Hemoglobin    9.4(L)          9.4(L)          9.4(L)      Lymph #    1.5      Lymph%    29.4      Magnesium    1.5(L)      [place] Miscellaneous Result  SEE COMMENTS  Comment:  Test                           Result   Flag  Unit    RefValue  --------------------------------------------------------------  Encephalopathy-Autoimmune Eval, S  Encephalopathy,  Interpretation, S  No informative autoantibodies were detected in this   evaluation. However, a negative result does not exclude   autoimmune encephalopathy, idiopathic or paraneoplastic.   Sensitivity and specificity of antibody testing are   enhanced by testing both serum and CSF.  NMDA-R Ab CBA, S             Negative               Negative  -------------------ADDITIONAL INFORMATION-------------------  This test was developed and its performance characteristics   determined by [facility] in a manner consistent with CLIA   requirements. This test has not been cleared or approved by   the U.S. Food and Drug Administration.  Neuronal (V-G) K+ Channel    0.00           nmol/L  <=0.02  Ab, S  -------------------ADDITIONAL INFORMATION-------------------  This test was developed and its performance characteristics   determined by [facility] in a manner consistent with CLIA   requirements. This test has not been cleared or approved by   the U.S. Food and Drug Administration.  LGI1-IgG CBA, S              Negative               Negative  -------------------ADDITIONAL INFORMATION-------------------  This test was developed and its performance characteristics   determined by [facility] in a manner consistent with CLIA    requirements. This test has not been cleared or approved by   the U.S. Food and Drug Administration.  CASPR2-IgG CBA, S            Negative               Negative  -------------------ADDITIONAL INFORMATION-------------------  This test was developed and its performance characteristics   determined by [facility] in a manner consistent with CLIA   requirements. This test has not been cleared or approved by   the U.S. Food and Drug Administration.  GAD65 Ab Assay, S            0.00           nmol/L  <= 0.02   -------------------ADDITIONAL INFORMATION-------------------  This test was developed and its performance characteristics   determined by [facility] in a manner consistent with CLIA   requirements. This test has not been cleared or approved by   the U.S. Food and Drug Administration.  JAKUB-B-R Ab CBA, S           Negative               Negative  -------------------ADDITIONAL INFORMATION-------------------  This test was developed and its performance characteristics   determined by [facility] in a manner consistent with CLIA   requirements. This test has not been cleared or approved by   the U.S. Food and Drug Administration.  AMPA-R Ab CBA, S             Negative               Negative  -------------------ADDITIONAL INFORMATION-------------------  This test was developed and its performance characteristics   determined by [facility] in a manner consistent with CLIA   requirements. This test has not been cleared or approved by   the U.S. Food and Drug Administration.  ALL-1, S                    Negative       titer   <1:240    Reflex Added                 None.                            -------------------ADDITIONAL INFORMATION-------------------  This test was developed and its performance characteristics   determined by [facility] in a manner consistent with CLIA   requirements. This test has not been cleared or approved by   the U.S. Food and Drug Administration.  ALL-2, S                    Negative        titer   <1:240    -------------------ADDITIONAL INFORMATION-------------------  This test was developed and its performance characteristics   determined by [facility] in a manner consistent with CLIA   requirements. This test has not been cleared or approved by   the U.S. Food and Drug Administration.  ALL-3, S                    Negative       titer   <1:240    -------------------ADDITIONAL INFORMATION-------------------  This test was developed and its performance characteristics   determined by [facility] in a manner consistent with CLIA   requirements. This test has not been cleared or approved by   the U.S. Food and Drug Administration.  AGNA-1, S                    Negative       titer   <1:240    -------------------ADDITIONAL INFORMATION-------------------  This test was developed and its performance characteristics   determined by [facility] in a manner consistent with CLIA   requirements. This test has not been cleared or approved by   the U.S. Food and Drug Administration.  PCA-1, S                     Negative       titer   <1:240    -------------------ADDITIONAL INFORMATION-------------------  This test was developed and its performance characteristics   determined by [facility] in a manner consistent with CLIA   requirements. This test has not been cleared or approved by   the U.S. Food and Drug Administration.  PCA-2, S                     Negative       titer   <1:240    -------------------ADDITIONAL INFORMATION-------------------  This test was developed and its performance characteristics   determined by [facility] in a manner consistent with CLIA   requirements. This test has not been cleared or approved by   the U.S. Food and Drug Administration.  PCA-Tr, S                    Negative       titer   <1:240    -------------------ADDITIONAL INFORMATION-------------------  This test was developed and its performance characteristics   determined by [facility] in a manner consistent with  CLIA   requirements. This test has not been cleared or approved by   the U.S. Food and Drug Administration.  Amphiphysin Ab, S            Negative       titer   <1:240    -------------------ADDITIONAL INFORMATION-------------------  This test was developed and its performance characteristics   determined by [facility] in a manner consistent with CLIA   requirements. This test has not been cleared or approved by   the U.S. Food and Drug Administration.  N-Type Calcium Channel Ab    0.00           nmol/L  <=0.03    -------------------ADDITIONAL INFORMATION-------------------  This test was developed and its performance characteristics   determined by [facility] in a manner consistent with CLIA   requirements. This test has not been cleared or approved by   the U.S. Food and Drug Administration.  P/Q-Type Calcium Channel Ab  0.00           nmol/L  <=0.02    -------------------ADDITIONAL INFORMATION-------------------  This test was developed and its performance characteristics   determined by [facility] in a manner consistent with CLIA   requirements. This test has not been cleared or approved by   the U.S. Food and Drug Administration.  ACh Receptor (Muscle)        0.00           nmol/L  <=0.02  Binding Ab  -------------------ADDITIONAL INFORMATION-------------------  This test was developed and its performance characteristics   determined by [facility] in a manner consistent with CLIA   requirements. This test has not been cleared or approved by   the U.S. Food and Drug Administration.  AChR Ganglionic Neuronal     0.00           nmol/L  <=0.02  Ab, S  -------------------ADDITIONAL INFORMATION-------------------  This test was developed and its performance characteristics   determined by [facility] in a manner consistent with CLIA   requirements. This test has not been cleared or approved by   the U.S. Food and Drug Administration.  CRMP-5-IgG, S                Negative       titer   <1:240     -------------------ADDITIONAL INFORMATION-------------------  This test was developed and its performance characteristics   determined by [facility] in a manner consistent with CLIA   requirements. This test has not been cleared or approved by   the U.S. Food and Drug Administration.  Test Performed by:  [facility] - [address] [address]          MCH    29.5          29.5          29.5      MCHC    33.8          33.8          33.8      MCV    87          87          87      Mono #    0.4      Mono%    8.0      MPV    8.4(L)          8.4(L)          8.4(L)      Phosphorus    2.8      Platelets    154          154          154      POCT Glucose 227(H)  254(H)  232(H)     Potassium    3.8      Total Protein    5.3(L)      RBC    3.19(L)          3.19(L)          3.19(L)      RDW    15.1(H)          15.1(H)          15.1(H)      Sodium    142      WBC    5.14          5.14          5.14                  05/31/17 2015 05/31/17  1929 05/31/17  1812      Albumin        Alkaline Phosphatase        ALT        Anion Gap        AST        Baso #        Basophil%        Total Bilirubin        BUN, Bld        Calcium        Chloride        CO2        Creatinine        Differential Method        eGFR if         eGFR if non         Eos #        Eosinophil%        Glucose        Gran #        Gran%        Hematocrit        Hemoglobin        Lymph #        Lymph%        Magnesium        [place] Miscellaneous Result        MCH        MCHC        MCV        Mono #        Mono%        MPV        Phosphorus        Platelets        POCT Glucose 271(H) 250(H) 294(H)     Potassium        Total Protein        RBC        RDW        Sodium        WBC              Significant Imaging:  MRI brain 5/13/17:  Multiple foci of FLAIR hyperintensity in the deep cerebral periventricular white matter in a configuration and distribution which can be seen in the  setting of underlying demyelinating process such as multiple sclerosis. There are several punctate foci of restricted diffusion including the left aspect of the midbrain tectum which in light of the aforementioned white matter changes may reflect regions of recent demyelination. Superimposed small acute infarcts would be difficult to exclude, although are felt less likely given the overall constellation of findings. Additionally, there is a 0.7 cm enhancing T2/FLAIR identified in the anterior right thalamus concerning for focus of active demyelination.           Also old lesions in corpus collosum         CTH 5/26/17  No detrimental change in this patient with volume loss, and multiple areas of hyperattenuation consistent with a infarcts, and periventricular hypoattenuation likely related to the patient's demyelinating disease.    MRI Brain 5/26/17  Please note evaluation is markedly limited by motion artifact, and the patient could not complete the exam.  However, the limited diffusion MRI findings are most compatible with acute infarction involving the right frontal and right temporal lobes as well as the left cerebellar hemisphere and left brainstem, as above.  No evidence of intracranial hemorrhage.    MRI Brain 5/28/17   Small to punctate-sized scattered foci of signal abnormality cerebral hemispheres, left cerebellum and brainstem similarly seen on the prior allowing for motion limitation.  This is superimposed on confluent regions of T2 flair signal abnormality supratentorial white matter and jose in light of corpus callosal involvement most suggestive for areas of recent or active demyelination superimposed on prior demyelination.  However alternative differential including areas of infarction with remote lacunar-type infarcts and age advanced chronic microvascular ischemic changes not excluded.    There is a small focus enhancement in the left dorsal jose differential to include but not limited to active  demyelination versus subacute infarction.    Allowing for motion compared to prior there is no significant interval change.  No hydrocephalus or midline shift.

## 2017-06-01 NOTE — PLAN OF CARE
Problem: Patient Care Overview  Goal: Plan of Care Review  Outcome: Ongoing (interventions implemented as appropriate)  Pt aaox4, AVSS on RA, up with 1 assist. accuchecks q6, neuro checks q4 maintained. Pt's home med of zoloft added today per wife's request of pt in depressed/withdrawn mood. Pt had cerebral angiogram today, went through R groin, dressing CDI, pt to lay flat until 8:15pm. Pt worked with PT/OT today, ambulated in [place] with assist, tolerated well. Condom cath in place currently. Wife remains at bedside. WCTM.

## 2017-06-01 NOTE — SUBJECTIVE & OBJECTIVE
Interval History: Patient seen and examined at bedside. Transfer from neuro critical care.     Review of Systems   Constitutional: Positive for activity change.   Eyes: Positive for visual disturbance.   Respiratory: Negative for shortness of breath.    Cardiovascular: Negative for chest pain.   Gastrointestinal: Negative for nausea and vomiting.   Skin: Negative for rash and wound.   Psychiatric/Behavioral: Positive for confusion. Negative for agitation and behavioral problems.     Objective:     Vital Signs (Most Recent):  Temp: 98.4 °F (36.9 °C) (06/01/17 1256)  Pulse: 65 (06/01/17 1256)  Resp: 16 (06/01/17 0734)  BP: (!) 115/58 (06/01/17 1256)  SpO2: 98 % (06/01/17 1256) Vital Signs (24h Range):  Temp:  [97.6 °F (36.4 °C)-98.7 °F (37.1 °C)] 98.4 °F (36.9 °C)  Pulse:  [65-92] 65  Resp:  [16-22] 16  SpO2:  [97 %-100 %] 98 %  BP: (115-156)/(58-86) 115/58     Weight: 95.1 kg (209 lb 10.5 oz)  Body mass index is 29.24 kg/m².    Intake/Output Summary (Last 24 hours) at 06/01/17 1431  Last data filed at 06/01/17 0900   Gross per 24 hour   Intake              270 ml   Output               20 ml   Net              250 ml      Physical Exam   Constitutional: He appears well-developed and well-nourished. No distress.   HENT:   Head: Normocephalic and atraumatic.   Pulmonary/Chest: Effort normal.   Neurological: He is alert.   Reflex Scores:       Bicep reflexes are 1+ on the right side and 1+ on the left side.       Brachioradialis reflexes are 1+ on the right side and 1+ on the left side.       Patellar reflexes are 1+ on the right side and 1+ on the left side.  Skin: Skin is warm and dry. He is not diaphoretic.   Psychiatric: His speech is normal.   More interactive today.   Nursing note and vitals reviewed.      Significant Labs:   CBC:   Recent Labs  Lab 05/31/17  0257 06/01/17  0442   WBC 5.38  5.38  5.38 5.14  5.14  5.14   HGB 9.7*  9.7*  9.7* 9.4*  9.4*  9.4*   HCT 28.2*  28.2*  28.2* 27.8*  27.8*  27.8*    *  142*  142* 154  154  154     CMP:   Recent Labs  Lab 05/31/17  0257 06/01/17  0442    142   K 3.8 3.8   * 111*   CO2 20* 21*   * 234*   BUN 10 12   CREATININE 1.0 0.9   CALCIUM 8.3* 8.1*   PROT 5.3* 5.3*   ALBUMIN 2.4* 2.4*   BILITOT 0.5 0.6   ALKPHOS 86 78   AST 22 17   ALT 26 23   ANIONGAP 8 10   EGFRNONAA >60.0 >60.0       Significant Imaging: I have reviewed all pertinent imaging results/findings within the past 24 hours.

## 2017-06-01 NOTE — PROGRESS NOTES
[facility] Hospital Medicine  Progress Note    Patient Name: Bessy Nunez  MRN: [id number]  Patient Class: IP- Inpatient   Admission Date: 5/23/2017  Length of Stay: 9 days  Attending Physician: Andrei Cornelius MD  Primary Care Provider: Edgar Nova MD    [facility] Medicine Team: [facility] HOSP MED A Andrei Cornelius MD    Subjective:     Principal Problem:Encephalopathy    HPI:  Patient is a 56 y/o male with poorly controlled Type 2 diabetes on insulin, HTN, mixed HLP and recently diagnosed with multiple sclerosis here at [facility] 2 weeks ago and discharged last week from [facility] from Hospital Medicine Team 3 after receiving 5 days of high dose IV Solumedrol. Patient was discharged on Medrol dospepak. Patient according to Dr. Cho states double vision was improving and getting better until this am when had a severe episode of double vision and dizziness and blacked out at home. Patient came around but was complaining of severe double vision and wife noted patient also confused after coming around so went to [facility]. Patient fell on the way to the bathroom. He does not remember falling, nor having any prodrome. Wife states she heard the thump and found him sitting on the floor, staring blankly into space, and confused.     At [place], patient noted to have WBC 19, but is on steroids. Blood culture sent and CXR done that was unremarkable. CT imaging of head unremarkable for any new findings. Patient noted to have  and BUN/Cr of 40/1.1 so Dr. Cho felt patient may just be dehydrated so patient given 1 liter of NS in ER. Patient feeling better. He spoke with Neurology here at [facility] who recommended admit for evaluation due to worsening double vision but Dr. Cho thinks likely just related to dehydration as patient has been having high blood sugars at home and during recent hospitalization.     On evaluation here at [facility], pt states he feels well; however wife  "explains he is still acting "not himself," confused. He denies current double or blurry vision. He endorses stumbling and lower leg weakness. He reports his BG run high and increased thirst.     Hospital Course:  No notes on file    Interval History: Patient seen and examined at bedside. Transfer from neuro critical care.     Review of Systems   Constitutional: Positive for activity change.   Eyes: Positive for visual disturbance.   Respiratory: Negative for shortness of breath.    Cardiovascular: Negative for chest pain.   Gastrointestinal: Negative for nausea and vomiting.   Skin: Negative for rash and wound.   Psychiatric/Behavioral: Positive for confusion. Negative for agitation and behavioral problems.     Objective:     Vital Signs (Most Recent):  Temp: 98.4 °F (36.9 °C) (06/01/17 1256)  Pulse: 65 (06/01/17 1256)  Resp: 16 (06/01/17 0734)  BP: (!) 115/58 (06/01/17 1256)  SpO2: 98 % (06/01/17 1256) Vital Signs (24h Range):  Temp:  [97.6 °F (36.4 °C)-98.7 °F (37.1 °C)] 98.4 °F (36.9 °C)  Pulse:  [65-92] 65  Resp:  [16-22] 16  SpO2:  [97 %-100 %] 98 %  BP: (115-156)/(58-86) 115/58     Weight: 95.1 kg (209 lb 10.5 oz)  Body mass index is 29.24 kg/m².    Intake/Output Summary (Last 24 hours) at 06/01/17 1431  Last data filed at 06/01/17 0900   Gross per 24 hour   Intake              270 ml   Output               20 ml   Net              250 ml      Physical Exam   Constitutional: He appears well-developed and well-nourished. No distress.   HENT:   Head: Normocephalic and atraumatic.   Pulmonary/Chest: Effort normal.   Neurological: He is alert.   Reflex Scores:       Bicep reflexes are 1+ on the right side and 1+ on the left side.       Brachioradialis reflexes are 1+ on the right side and 1+ on the left side.       Patellar reflexes are 1+ on the right side and 1+ on the left side.  Skin: Skin is warm and dry. He is not diaphoretic.   Psychiatric: His speech is normal.   More interactive today.   Nursing note and " "vitals reviewed.      Significant Labs:   CBC:   Recent Labs  Lab 05/31/17 0257 06/01/17  0442   WBC 5.38  5.38  5.38 5.14  5.14  5.14   HGB 9.7*  9.7*  9.7* 9.4*  9.4*  9.4*   HCT 28.2*  28.2*  28.2* 27.8*  27.8*  27.8*   *  142*  142* 154  154  154     CMP:   Recent Labs  Lab 05/31/17 0257 06/01/17  0442    142   K 3.8 3.8   * 111*   CO2 20* 21*   * 234*   BUN 10 12   CREATININE 1.0 0.9   CALCIUM 8.3* 8.1*   PROT 5.3* 5.3*   ALBUMIN 2.4* 2.4*   BILITOT 0.5 0.6   ALKPHOS 86 78   AST 22 17   ALT 26 23   ANIONGAP 8 10   EGFRNONAA >60.0 >60.0       Significant Imaging: I have reviewed all pertinent imaging results/findings within the past 24 hours.    Assessment/Plan:      Diplopia    - Originally presenting symptom of MS.   - Resolved by the time of my evaluation. EOMI on exam.   - Diplopia following syncope may have been "unmasking" as opposed to acute neurologic problem.           Leukocytosis    - WBC 19, most likely 2/2 steroids, especially given no s/sx of infection.           Syncope    - Most likely 2/2 dehydration/ hypovolemia in setting of hyperglycemia (especially on steroids). Other ddx includes weakness/miscoordination 2/2 newly diagnosed MS, seizure (no history); stroke is less likely given negative head CT, cardiac arrhythmia less likely given normal EKG.   - Received 1L IVF at OSH. Will continue IVF, NS 150ml/hr x10hr.   - Fall precautions, seizure precautions.   - PT/OT given weakness.   - Neurology consult.   - Defer further steroid use to Neurology.         Type 2 diabetes mellitus with diabetic polyneuropathy, with long-term current use of insulin    - Hyperglycemic in setting of uncontrolled insulin dependent DM and steroid use.   - DM diet.   - Home regimen includes: tresiba 42U qhs, victoza 1.8mg once daily, metformin.   - Last hospitalization was on Detemir 32U qhs, and Aspart 22U TIDWM.   - Will start with Detemir 33U, Aspart 11U TIDWM, and LDSSI. " Titrate as needed.   - Dietary consult for polyphagia in setting of uncontrolled DM        Essential hypertension    - Cont home: ASA, Atenolol, Diltiazem, Valsartan.             Mixed hyperlipidemia    - Cont home Atorvastatin             Depressive disorder, not elsewhere classified    - cont home zoloft            VTE Risk Mitigation         Ordered     Place sequential compression device  Until discontinued      05/24/17 0303     Medium Risk of VTE  Once      05/24/17 0303          Andrei Cornelius MD  Department of Hospital Medicine   [facility]

## 2017-06-01 NOTE — PLAN OF CARE
Problem: SLP Goal  Goal: SLP Goal  Goals to be met 6/6  1.Pt will tolerate regulart diet and thin liquids  2 pt will participate in eval of reading/writing and visual spatial deficits to determine further goals  3 Pt will be ox4 using any  Modality with min a to improve orientation  4 Pt will follow 3 step commands with 70% acc and mod a to improve receptive lang  5 Pt will complete mental manipulation task with 60% acc and mod a to improve cognition   Outcome: Ongoing (interventions implemented as appropriate)  Pt progressing towards goals. Pt reporting tolerance of regular diet and thin liquids. Unable to check 2/2 pt NPO for angiogram today. ST will continue to follow.   TRICE Alfonso., CCC-SLP  06/01/2017

## 2017-06-01 NOTE — NURSING
Paged MED A MD regarding pt's wifes concern of pt not getting zoloft. Also need clarification of diet order for angiogram today, as well as POCT and insulin orders. Awaiting callback.

## 2017-06-01 NOTE — PROGRESS NOTES
Spoke to bedside RN to clarify if patient was able to consent for himself. RN stated, patient waxes and wanes. Upon assessment with MD patient is AAOX4.

## 2017-06-01 NOTE — PT/OT/SLP RE-EVAL
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    Mixed hyperlipidemia 11/9/2012    NAFLD (nonalcoholic fatty liver disease) 10/11/2013    CHASE (nonalcoholic steatohepatitis)     Obesity     Stroke     Type 2 diabetes mellitus with diabetic polyneuropathy, with long-term current use of insulin 5/14/2017      Past Surgical History:   Procedure Laterality Date    SKIN CANCER EXCISION         Referring physician: CURTIS Cornelius  Date referred to PT: 05/31/2017    General Precautions: Standard, fall, aspiration, seizure  Orthopedic Precautions: N/A   Braces: N/A       Do you have any cultural, spiritual, Uatsdin conflicts, given your current situation?: none    Patient History:  Lives With: spouse  Living Arrangements: house  Home Accessibility: stairs within home  Home Layout: Able to live on 1st floor  Transportation Available: car, family or friend will provide  Equipment Currently Used at Home: walker, rolling  DME owned (not currently used): rolling walker    Previous Level of Function:  Ambulation Skills: independent  Transfer Skills: independent  ADL Skills: independent  Work/Leisure Activity: independent    Subjective:  Communicated with RN prior to session.  Pt agreeable to therapy session.   Chief Complaint: NA  Patient goals: return home    Pain/Comfort  Pain Rating 1: 0/10  Pain Rating Post-Intervention 1: 0/10    Objective:   Patient found with: peripheral IV, telemetry     Cognitive Exam:  Oriented to: Person and Place    Follows Commands/attention: Follows multistep  commands  Communication: clear/fluent  Safety awareness/insight to disability:  impaired    Physical Exam:  Postural examination/scapula alignment: Rounded shoulder    Skin integrity: Visible skin intact  Edema: None noted B LE    Sensation:   Intact  light/touch B LE    Lower Extremity Range of Motion:  Right Lower Extremity: WFL  Left Lower Extremity: WFL    Lower Extremity Strength:  Right Lower Extremity: WFL  Left Lower Extremity: WFL     Gross motor coordination: WFL    Functional Mobility:  Bed Mobility:  Supine to Sit: Supervision    Transfers:  Sit <> Stand Assistance: Stand By Assistance (vc's for hand placement)  Sit <> Stand Assistive Device: Rolling Walker    Gait:   Gait Distance: ~200ft vc's for safety and AD management; no LOB and mild SOB  Assistance 1: Contact Guard Assistance, Stand by Assistance  Gait Assistive Device: Rolling walker  Gait Pattern: reciprocal  Gait Deviation(s): decreased justin, decreased step length, decreased stride length    Balance:   Static Sit: GOOD-: Takes MODERATE challenges from all directions but inconsistently  Dynamic Sit: GOOD-: Maintains balance through MODERATE excursions of active trunk movement,     Static Stand: FAIR+: Takes MINIMAL challenges from all directions  Dynamic stand: FAIR: Needs CONTACT GUARD during gait    Therapeutic Activities and Exercises:  Pt and wife educated on role of PT/POC.  Pt safe to amb with RW with RN staff.     AM-PAC 6 CLICK MOBILITY  How much help from another person does this patient currently need?   1 = Unable, Total/Dependent Assistance  2 = A lot, Maximum/Moderate Assistance  3 = A little, Minimum/Contact Guard/Supervision  4 = None, Modified [place]/Independent    Turning over in bed (including adjusting bedclothes, sheets and blankets)?: 4  Sitting down on and standing up from a chair with arms (e.g., wheelchair, bedside commode, etc.): 3  Moving from lying on back to sitting on the side of the bed?: 3  Moving to and from a bed to a chair (including a wheelchair)?: 3  Need to walk in hospital  room?: 3  Climbing 3-5 steps with a railing?: 3  Total Score: 19     AM-PAC Raw Score CMS G-Code Modifier Level of Impairment Assistance   6 % Total / Unable   7 - 9 CM 80 - 100% Maximal Assist   10 - 14 CL 60 - 80% Moderate Assist   15 - 19 CK 40 - 60% Moderate Assist   20 - 22 CJ 20 - 40% Minimal Assist   23 CI 1-20% SBA / CGA   24 CH 0% Independent/ Mod I     Patient left up in chair with all lines intact, call button in reach, RN notified and wife present.    Assessment:   Bessy Nunez is a 57 y.o. male with a medical diagnosis of Encephalopathy and presents with decreased balance, endurance, safety awareness and overall functional mobility. Pt performed bed mobility S and transfers SBA with RW. Pt amb ~200ft CGA/SBA with RW, vc's for safety and AD management; no LOB and mild SOB. Pt will continue to benefit from skilled PT to improve deficits and increase overall functional mobility.     Rehab identified problem list/impairments: Rehab identified problem list/impairments: gait instability, impaired endurance, impaired balance, impaired functional mobilty, decreased safety awareness    Rehab potential is good.    Activity tolerance: Good    Discharge recommendations: Discharge Facility/Level Of Care Needs: home with home health     Barriers to discharge: Barriers to Discharge: Inaccessible home environment    Equipment recommendations: Equipment Needed After Discharge: bath bench, bedside commode     GOALS:    Physical Therapy Goals        Problem: Physical Therapy Goal    Goal Priority Disciplines Outcome Goal Variances Interventions   Physical Therapy Goal     PT/OT, PT Ongoing (interventions implemented as appropriate)     Description:  Goals to be met by: 2017     Patient will increase functional independence with mobility by performin. Sit to stand transfer with Supervision  2. Gait  x 250 feet with Supervision using Rolling Walker.   3. Ascend/descend 12 stair with right Handrails  Supervision.   4. Lower extremity exercise program x15 reps per handout, with supervision                       PLAN:    Patient to be seen 3 x/week to address the above listed problems via gait training, therapeutic activities, therapeutic exercises  Plan of Care expires: 06/24/17  Plan of Care reviewed with: patient, spouse          ANNETTE FRANKLIN, PT  06/01/2017

## 2017-06-01 NOTE — NURSING
Pt back to room. AVSS on RA. No c/o pain. BG checked 184. Pt to remain flat until 8:15pm. Pt and wife verbalize understanding. Right groin assessed, WCTM.

## 2017-06-01 NOTE — PROCEDURES
Radiology Post-Procedure Note    Pre Op Diagnosis: vasculitis    Post Op Diagnosis: same    Procedure: Cerebral angiogram    Procedure performed by: Dr. Dove, Dr. Burgos    Written Informed Consent Obtained: Yes    Specimen Removed: NO    Estimated Blood Loss: Minimal    Procedure report:     A 5F sheath was placed into the right femoral artery and a 5F Edi catheter was advanced into the aortic arch.  The bilateral common carotid, bilateral internal carotid, and left vertebral arteries were subselected and angiography of the brain was performed after injection into each of these vessels.    Preliminary interpretation: multifocal areas of intracranial stenosis.  Please see Imaging report for full details.    A right femoral artery angiogram was performed, the sheath removed and hemostasis achieved using Exoseal.     The patient tolerated the procedure well.     Guillaume Burgos MD  Department of Radiology  [phone]

## 2017-06-01 NOTE — PT/OT/SLP EVAL
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    Mixed hyperlipidemia 11/9/2012    NAFLD (nonalcoholic fatty liver disease) 10/11/2013    CHASE (nonalcoholic steatohepatitis)     Obesity     Stroke     Type 2 diabetes mellitus with diabetic polyneuropathy, with long-term current use of insulin 5/14/2017      Past Surgical History:   Procedure Laterality Date    SKIN CANCER EXCISION         Referring physician: Dr Charla Mathias  Date referred to OT: 6/1/17    General Precautions: Standard, fall, aspiration, seizure  Orthopedic Precautions:    Braces: N/A    Do you have any cultural, spiritual, Sabianist conflicts, given your current situation?: None stated     Patient History:  Living Environment  Lives With: spouse  Living Arrangements: house  Home Accessibility: stairs to enter home, stairs within home  Home Layout: Bathroom on 2nd floor, Bedroom on 2nd floor  Number of Stairs Within Home:  (One flight from 1st to 2nd floors)  Stair Railings at Home: inside, present at both sides  Transportation Available: family or friend will provide  Living Environment Comment:  (Pt has bed and bathroom on 2nd floor of his home and will require (S) 24/7 for safety in home setting.)  Equipment Currently Used at Home: none    Prior level of function:   Bed Mobility/Transfers: independent  Grooming: independent  Bathing: independent  Upper Body Dressing: independent  Lower Body Dressing: independent  Toileting: independent  Driving License: Yes  Mode of Transportation: Car, Family  Occupation: Retired  Type of Occupation: Oil       Dominant hand: right    Subjective:  Communicated with nurse prior to session.    Chief Complaint: Encephalopathy  Patient/Family stated goals: Pt's wife would like for Pt to assist with ADLS more in home setting.    Pain/Comfort  Pain Rating 1: 0/10  Pain Rating Post-Intervention 1: 0/10    Objective:  Patient found with: peripheral IV, telemetry    Cognitive Exam:  Oriented to: Person and Place  Follows Commands/attention: Follows one-step commands  Communication: clear/fluent  Memory:  No Deficits noted  Safety awareness/insight to disability: intact  Coping skills/emotional control: Appropriate to situation    Visual/perceptual:  Intact    Physical Exam:  Postural examination/scapula alignment: No postural abnormalities identified  Skin integrity: Visible skin intact  Edema: None noted (B) UEs    Sensation:   Intact    Upper Extremity Range of Motion:  Right Upper Extremity: WFL  Left Upper Extremity: WFL    Upper Extremity Strength:  Right Upper Extremity: WFL  Left Upper Extremity: WFL   Strength: WFLS    Fine motor coordination:   Intact    Gross motor coordination: WFL    Functional Mobility:  Bed Mobility:  Rolling/Turning to Left: Supervision  Rolling/Turning Right: Supervision  Scooting/Bridging: Supervision  Supine to Sit: Supervision  Sit to Supine: Supervision    Transfers:  Sit <> Stand Assistance: Stand By Assistance (with sit to stand performed 4 times when performing functional transfers and LBD.)  Sit <> Stand Assistive Device: Rolling Walker  Toilet Transfer Technique: Stand Pivot  Toilet Transfer Assistance: Stand By Assistance    Functional Ambulation: Pt ambulated from EOB to restroom  13 ft with (S) and RW, Performed grooming and then ambulated 13 ft back to EOB    Activities of Daily Living:  Feeding Level of Assistance: Supervision    UE Dressing Level of Assistance: Set-up Assistance (with no assist needed after set up with [facility]  "[facility]    LE Dressing Level of Assistance: Set-up Assistance (with Pt able to don socks and pants  with (S) when standing)    Grooming Position: Standing at sink  Grooming Level of Assistance: Supervision     Balance:   Static Sit: GOOD+: Takes MAXIMAL challenges from all directions.    Dynamic Sit: FAIR+: Maintains balance through MINIMAL excursions of active trunk motion  Static Stand: FAIR: Maintains without assist but unable to take challenges  RW to stand  Dynamic stand: FAIR+: Needs CLOSE SUPERVISION during gait and is able to right self with minor LOB  RW to steady     Therapeutic Activities :  OT evaluation performed.  White board updated.  Pt educated on role of OT, safety with functional mobility and ADLs    AM-PAC 6 CLICK ADL  How much help from another person does this patient currently need?  1 = Unable, Total/Dependent Assistance  2 = A lot, Maximum/Moderate Assistance  3 = A little, Minimum/Contact Guard/Supervision  4 = None, Modified [place]/Independent    Putting on and taking off regular lower body clothing? : 3  Bathing (including washing, rinsing, drying)?: 3  Toileting, which includes using toilet, bedpan, or urinal? : 3  Putting on and taking off regular upper body clothing?: 3  Taking care of personal grooming such as brushing teeth?: 4  Eating meals?: 4  Total Score: 20    AM-PAC Raw Score CMS "G-Code Modifier Level of Impairment Assistance   6 % Total / Unable   7 - 9 CM 80 - 100% Maximal Assist   10-14 CL 60 - 80% Moderate Assist   15 - 19 CK 40 - 60% Moderate Assist   20 - 22 CJ 20 - 40% Minimal Assist   23 CI 1-20% SBA / CGA   24 CH 0% Independent/ Mod I       Patient left HOB elevated with call button in reach and nurse notified and wife present.    Assessment:  Bessy Nunez is a 57 y.o. male with a medical diagnosis of Encephalopathy and tolerated Tx without incident. He currently demonstrated decreased functional mobility, functional endurance, functional transfers, self " care task performance, functional strength and functional standing balance.  Rehab identified problem list/impairments: Rehab identified problem list/impairments: gait instability, impaired cognition, impaired endurance    Rehab potential is good.    Activity tolerance: Good    Discharge recommendations: Discharge Facility/Level Of Care Needs: home with home health, home health OT     Barriers to discharge: Barriers to Discharge: Inaccessible home environment    Equipment recommendations: bedside commode, shower chair     GOALS:    Occupational Therapy Goals        Problem: Occupational Therapy Goal    Goal Priority Disciplines Outcome Interventions   Occupational Therapy Goal     OT, PT/OT Ongoing (interventions implemented as appropriate)    Description:  Goals to be met by: 06/10/17     Patient will increase functional independence with ADLs by performing:    Feeding with Modified [place].  UE Dressing with Supervision  Dressing with SBA.  Grooming while seated with Set-up Assistance.  Toileting from bedside commode with Min Assistance for hygiene and clothing management.   Supine to sit with Supervision  Toilet transfer to bedside commode with SBA with RW.  Upper extremity exercise program x12 reps per handout, with supervision.                       PLAN:  Patient to be seen 3 x/week to address the above listed problems via self-care/home management, therapeutic activities, therapeutic exercises, cognitive retraining  Plan of Care expires: 07/01/17  Plan of Care reviewed with: patient         Salvador Garcia, BROWNR/L  06/01/2017

## 2017-06-01 NOTE — NURSING
Theres an order for scheduled aspart insulin to begin 6/01 at 2100. Pt is achs/nightly, Called neuro critical care to correct order. Md stated that he will. Will continue to monitor.

## 2017-06-01 NOTE — PLAN OF CARE
06/01/17 1231   Discharge Reassessment   Assessment Type Discharge Planning Reassessment   Can the patient answer the patient profile reliably? Yes, cognitively intact   How does the patient rate their overall health at the present time? Fair   Describe the patient's ability to walk at the present time. (awaiting therapy recommendations stepdown from ICU )   How often would a person be available to care for the patient? Whenever needed   Number of comorbid conditions (as recorded on the chart) Two   During the past month, has the patient often been bothered by feeling down, depressed or hopeless? No   During the past month, has the patient often been bothered by little interest or pleasure in doing things? No   Provided patient/caregiver education on the expected discharge date and the discharge plan Yes   Discharge Plan A Rehab   Discharge Plan B Home Health  (outpatient therapy)   Change in patient condition or support system No

## 2017-06-02 DIAGNOSIS — G35 MULTIPLE SCLEROSIS: ICD-10-CM

## 2017-06-02 PROBLEM — I77.6 CNS VASCULITIS: Status: ACTIVE | Noted: 2017-06-02

## 2017-06-02 LAB
ALBUMIN SERPL BCP-MCNC: 2.4 G/DL
ALP SERPL-CCNC: 82 U/L
ALT SERPL W/O P-5'-P-CCNC: 21 U/L
ANION GAP SERPL CALC-SCNC: 9 MMOL/L
AST SERPL-CCNC: 19 U/L
BACTERIA BLD CULT: NORMAL
BACTERIA BLD CULT: NORMAL
BASOPHILS # BLD AUTO: 0.01 K/UL
BASOPHILS NFR BLD: 0.2 %
BILIRUB SERPL-MCNC: 0.5 MG/DL
BUN SERPL-MCNC: 10 MG/DL
CALCIUM SERPL-MCNC: 7.9 MG/DL
CARDIOLIPIN IGG SER IA-ACNC: <9.4 GPL
CARDIOLIPIN IGM SER IA-ACNC: <9.4 MPL
CHLORIDE SERPL-SCNC: 111 MMOL/L
CO2 SERPL-SCNC: 21 MMOL/L
CREAT SERPL-MCNC: 1 MG/DL
DIFFERENTIAL METHOD: ABNORMAL
EOSINOPHIL # BLD AUTO: 0.1 K/UL
EOSINOPHIL NFR BLD: 2 %
ERYTHROCYTE [DISTWIDTH] IN BLOOD BY AUTOMATED COUNT: 15.4 %
EST. GFR  (AFRICAN AMERICAN): >60 ML/MIN/1.73 M^2
EST. GFR  (NON AFRICAN AMERICAN): >60 ML/MIN/1.73 M^2
GLUCOSE SERPL-MCNC: 232 MG/DL
HCT VFR BLD AUTO: 26.1 %
HGB BLD-MCNC: 9.1 G/DL
LYMPHOCYTES # BLD AUTO: 1.4 K/UL
LYMPHOCYTES NFR BLD: 25.8 %
MAGNESIUM SERPL-MCNC: 1.7 MG/DL
MCH RBC QN AUTO: 30.6 PG
MCHC RBC AUTO-ENTMCNC: 34.9 %
MCV RBC AUTO: 88 FL
MONOCYTES # BLD AUTO: 0.4 K/UL
MONOCYTES NFR BLD: 6.4 %
NEUTROPHILS # BLD AUTO: 3.6 K/UL
NEUTROPHILS NFR BLD: 65.4 %
OB PNL STL: POSITIVE
PHOSPHATE SERPL-MCNC: 2.7 MG/DL
PLATELET # BLD AUTO: 164 K/UL
PMV BLD AUTO: 8.6 FL
POCT GLUCOSE: 152 MG/DL (ref 70–110)
POCT GLUCOSE: 188 MG/DL (ref 70–110)
POCT GLUCOSE: 246 MG/DL (ref 70–110)
POCT GLUCOSE: 273 MG/DL (ref 70–110)
POCT GLUCOSE: 274 MG/DL (ref 70–110)
POCT GLUCOSE: 292 MG/DL (ref 70–110)
POTASSIUM SERPL-SCNC: 3.8 MMOL/L
PROT SERPL-MCNC: 5.7 G/DL
RBC # BLD AUTO: 2.97 M/UL
SODIUM SERPL-SCNC: 141 MMOL/L
WBC # BLD AUTO: 5.51 K/UL

## 2017-06-02 PROCEDURE — 83735 ASSAY OF MAGNESIUM: CPT

## 2017-06-02 PROCEDURE — C9113 INJ PANTOPRAZOLE SODIUM, VIA: HCPCS | Performed by: NURSE PRACTITIONER

## 2017-06-02 PROCEDURE — A9585 GADOBUTROL INJECTION: HCPCS | Performed by: HOSPITALIST

## 2017-06-02 PROCEDURE — 99233 SBSQ HOSP IP/OBS HIGH 50: CPT | Mod: ,,, | Performed by: PSYCHIATRY & NEUROLOGY

## 2017-06-02 PROCEDURE — 25000003 PHARM REV CODE 250: Performed by: HOSPITALIST

## 2017-06-02 PROCEDURE — 25000003 PHARM REV CODE 250: Performed by: NURSE PRACTITIONER

## 2017-06-02 PROCEDURE — 63600175 PHARM REV CODE 636 W HCPCS: Performed by: NURSE PRACTITIONER

## 2017-06-02 PROCEDURE — 80053 COMPREHEN METABOLIC PANEL: CPT

## 2017-06-02 PROCEDURE — 25000003 PHARM REV CODE 250: Performed by: STUDENT IN AN ORGANIZED HEALTH CARE EDUCATION/TRAINING PROGRAM

## 2017-06-02 PROCEDURE — 25500020 PHARM REV CODE 255: Performed by: HOSPITALIST

## 2017-06-02 PROCEDURE — 97803 MED NUTRITION INDIV SUBSEQ: CPT

## 2017-06-02 PROCEDURE — 85025 COMPLETE CBC W/AUTO DIFF WBC: CPT

## 2017-06-02 PROCEDURE — 84100 ASSAY OF PHOSPHORUS: CPT

## 2017-06-02 PROCEDURE — 20600001 HC STEP DOWN PRIVATE ROOM

## 2017-06-02 PROCEDURE — 36415 COLL VENOUS BLD VENIPUNCTURE: CPT

## 2017-06-02 PROCEDURE — 25000003 PHARM REV CODE 250: Performed by: PSYCHIATRY & NEUROLOGY

## 2017-06-02 PROCEDURE — 82595 ASSAY OF CRYOGLOBULIN: CPT

## 2017-06-02 PROCEDURE — 82272 OCCULT BLD FECES 1-3 TESTS: CPT

## 2017-06-02 PROCEDURE — 63600175 PHARM REV CODE 636 W HCPCS: Performed by: PSYCHIATRY & NEUROLOGY

## 2017-06-02 PROCEDURE — 63600175 PHARM REV CODE 636 W HCPCS: Performed by: HOSPITALIST

## 2017-06-02 PROCEDURE — 99233 SBSQ HOSP IP/OBS HIGH 50: CPT | Mod: ,,, | Performed by: INTERNAL MEDICINE

## 2017-06-02 PROCEDURE — 86146 BETA-2 GLYCOPROTEIN ANTIBODY: CPT | Mod: 59

## 2017-06-02 RX ORDER — PROCHLORPERAZINE MALEATE 10 MG
10 TABLET ORAL
Status: CANCELLED | OUTPATIENT
Start: 2017-06-02

## 2017-06-02 RX ORDER — PANTOPRAZOLE SODIUM 40 MG/1
40 TABLET, DELAYED RELEASE ORAL 2 TIMES DAILY
Status: DISCONTINUED | OUTPATIENT
Start: 2017-06-02 | End: 2017-06-04 | Stop reason: HOSPADM

## 2017-06-02 RX ORDER — SODIUM CHLORIDE 0.9 % (FLUSH) 0.9 %
10 SYRINGE (ML) INJECTION
Status: CANCELLED | OUTPATIENT
Start: 2017-06-02

## 2017-06-02 RX ORDER — DEXTROSE MONOHYDRATE AND SODIUM CHLORIDE 5; .45 G/100ML; G/100ML
INJECTION, SOLUTION INTRAVENOUS CONTINUOUS
Status: ACTIVE | OUTPATIENT
Start: 2017-06-02 | End: 2017-06-03

## 2017-06-02 RX ORDER — INSULIN ASPART 100 [IU]/ML
12 INJECTION, SOLUTION INTRAVENOUS; SUBCUTANEOUS
Status: DISCONTINUED | OUTPATIENT
Start: 2017-06-02 | End: 2017-06-03

## 2017-06-02 RX ORDER — PROCHLORPERAZINE EDISYLATE 5 MG/ML
10 INJECTION INTRAMUSCULAR; INTRAVENOUS ONCE
Status: DISCONTINUED | OUTPATIENT
Start: 2017-06-02 | End: 2017-06-02

## 2017-06-02 RX ORDER — GADOBUTROL 604.72 MG/ML
10 INJECTION INTRAVENOUS
Status: COMPLETED | OUTPATIENT
Start: 2017-06-02 | End: 2017-06-02

## 2017-06-02 RX ORDER — DEXTROSE MONOHYDRATE AND SODIUM CHLORIDE 5; .45 G/100ML; G/100ML
INJECTION, SOLUTION INTRAVENOUS CONTINUOUS
Status: CANCELLED | OUTPATIENT
Start: 2017-06-02

## 2017-06-02 RX ORDER — DEXTROSE MONOHYDRATE AND SODIUM CHLORIDE 5; .45 G/100ML; G/100ML
INJECTION, SOLUTION INTRAVENOUS CONTINUOUS
Status: ACTIVE | OUTPATIENT
Start: 2017-06-02 | End: 2017-06-02

## 2017-06-02 RX ORDER — PROCHLORPERAZINE EDISYLATE 5 MG/ML
10 INJECTION INTRAMUSCULAR; INTRAVENOUS ONCE
Status: COMPLETED | OUTPATIENT
Start: 2017-06-02 | End: 2017-06-02

## 2017-06-02 RX ORDER — HEPARIN 100 UNIT/ML
500 SYRINGE INTRAVENOUS
Status: CANCELLED | OUTPATIENT
Start: 2017-06-02

## 2017-06-02 RX ORDER — ONDANSETRON 2 MG/ML
8 INJECTION INTRAMUSCULAR; INTRAVENOUS ONCE
Status: DISCONTINUED | OUTPATIENT
Start: 2017-06-02 | End: 2017-06-02

## 2017-06-02 RX ORDER — DEXTROSE MONOHYDRATE AND SODIUM CHLORIDE 5; .45 G/100ML; G/100ML
INJECTION, SOLUTION INTRAVENOUS CONTINUOUS
Status: CANCELLED | OUTPATIENT
Start: 2017-06-02 | End: 2017-06-02

## 2017-06-02 RX ORDER — ONDANSETRON 2 MG/ML
8 INJECTION INTRAMUSCULAR; INTRAVENOUS ONCE
Status: COMPLETED | OUTPATIENT
Start: 2017-06-02 | End: 2017-06-02

## 2017-06-02 RX ORDER — DEXTROSE MONOHYDRATE AND SODIUM CHLORIDE 5; .45 G/100ML; G/100ML
INJECTION, SOLUTION INTRAVENOUS CONTINUOUS
Status: DISCONTINUED | OUTPATIENT
Start: 2017-06-02 | End: 2017-06-02

## 2017-06-02 RX ADMIN — LEVETIRACETAM 500 MG: 500 TABLET, FILM COATED ORAL at 09:06

## 2017-06-02 RX ADMIN — INSULIN ASPART 2 UNITS: 100 INJECTION, SOLUTION INTRAVENOUS; SUBCUTANEOUS at 01:06

## 2017-06-02 RX ADMIN — INSULIN ASPART 12 UNITS: 100 INJECTION, SOLUTION INTRAVENOUS; SUBCUTANEOUS at 05:06

## 2017-06-02 RX ADMIN — DEXTROSE AND SODIUM CHLORIDE: 5; .45 INJECTION, SOLUTION INTRAVENOUS at 10:06

## 2017-06-02 RX ADMIN — GADOBUTROL 10 ML: 604.72 INJECTION INTRAVENOUS at 04:06

## 2017-06-02 RX ADMIN — ONDANSETRON 8 MG: 2 INJECTION INTRAMUSCULAR; INTRAVENOUS at 09:06

## 2017-06-02 RX ADMIN — INSULIN ASPART 12 UNITS: 100 INJECTION, SOLUTION INTRAVENOUS; SUBCUTANEOUS at 01:06

## 2017-06-02 RX ADMIN — SERTRALINE HYDROCHLORIDE 25 MG: 25 TABLET ORAL at 10:06

## 2017-06-02 RX ADMIN — DILTIAZEM HYDROCHLORIDE 60 MG: 60 TABLET, FILM COATED ORAL at 05:06

## 2017-06-02 RX ADMIN — VITAMIN D, TAB 1000IU (100/BT) 5000 UNITS: 25 TAB at 10:06

## 2017-06-02 RX ADMIN — PANTOPRAZOLE SODIUM 40 MG: 40 INJECTION, POWDER, FOR SOLUTION INTRAVENOUS at 10:06

## 2017-06-02 RX ADMIN — PANTOPRAZOLE SODIUM 40 MG: 40 TABLET, DELAYED RELEASE ORAL at 09:06

## 2017-06-02 RX ADMIN — ATORVASTATIN CALCIUM 40 MG: 20 TABLET, FILM COATED ORAL at 10:06

## 2017-06-02 RX ADMIN — CYCLOPHOSPHAMIDE 1645 MG: 2 INJECTION, POWDER, FOR SOLUTION INTRAVENOUS; ORAL at 10:06

## 2017-06-02 RX ADMIN — DILTIAZEM HYDROCHLORIDE 60 MG: 60 TABLET, FILM COATED ORAL at 01:06

## 2017-06-02 RX ADMIN — PROCHLORPERAZINE EDISYLATE 10 MG: 5 INJECTION INTRAMUSCULAR; INTRAVENOUS at 09:06

## 2017-06-02 RX ADMIN — INSULIN ASPART 10 UNITS: 100 INJECTION, SOLUTION INTRAVENOUS; SUBCUTANEOUS at 10:06

## 2017-06-02 RX ADMIN — DILTIAZEM HYDROCHLORIDE 60 MG: 60 TABLET, FILM COATED ORAL at 09:06

## 2017-06-02 RX ADMIN — INSULIN ASPART 1 UNITS: 100 INJECTION, SOLUTION INTRAVENOUS; SUBCUTANEOUS at 09:06

## 2017-06-02 RX ADMIN — LEVETIRACETAM 500 MG: 500 TABLET, FILM COATED ORAL at 10:06

## 2017-06-02 RX ADMIN — INSULIN ASPART 1 UNITS: 100 INJECTION, SOLUTION INTRAVENOUS; SUBCUTANEOUS at 05:06

## 2017-06-02 RX ADMIN — INSULIN DETEMIR 38 UNITS: 100 INJECTION, SOLUTION SUBCUTANEOUS at 09:06

## 2017-06-02 RX ADMIN — INSULIN ASPART 6 UNITS: 100 INJECTION, SOLUTION INTRAVENOUS; SUBCUTANEOUS at 10:06

## 2017-06-02 NOTE — PLAN OF CARE
Problem: Patient Care Overview  Goal: Plan of Care Review  Outcome: Ongoing (interventions implemented as appropriate)  POC reviewed with pt, pt verbalizes understanding. Pt aaox4, flat affect. Slow to respond at times. RAMIREZ. Unsteady gait, OOB with assist. Pt on tele, NSR. Pt on RA. Pt with good appetite, ABGs AC&HS. Pt with one episode of tarry stool this afternoon. Pt with condom catheter on this AM, pt requesting to use urinal this afternoon, able to void in urinal successfully. Pt resting in bed at this time, pending transfer to NSU. wife at bedside, bed alarm on, call bell within reach, will continue to monitor.

## 2017-06-02 NOTE — PROGRESS NOTES
"  [facility]  Adult Nutrition  Consult Note    SUMMARY     Recommendations    1. Continue current 2200 kcal ADA diet.   2. Diabetic diet education given.   3. RD to monitor & follow-up.    Goals: PO intake >50%  Nutrition Goal Status: new  Communication of RD Recs: reviewed with RN    Reason for Assessment    Reason for Assessment: RD follow-up  Diagnosis: diabetes diagnosis/complications (syncope, DM2, NAFLD, MS)      Interdisciplinary Rounds: did not attend     General Information Comments: Pt with good appetite, consuming 100% of meals. Diabetic diet education given.    Nutrition Discharge Planning: Adequate PO intake; dietary compliance    Nutrition Prescription Ordered    Current Diet Order: 2200 kcal ADA      Nutrition Risk Screen     Nutrition Risk Screen: no indicators present    Nutrition/Diet History    Patient Reported Diet/Restrictions/Preferences: general     Food Preferences: No cultural/Alevism needs reported.     Factors Affecting Nutritional Intake:  (none at this time)    Labs/Tests/Procedures/Meds    Pertinent Labs Reviewed: reviewed  Pertinent Labs Comments: Gluc 232, A1C 11.4  Pertinent Medications Reviewed: reviewed  Pertinent Medications Comments: statin, insulin, Vit D    Physical Findings    Overall Physical Appearance: obese  Oral/Mouth Cavity: tooth/teeth missing    Anthropometrics    Height: 5' 11" (180.3 cm)  Weight Method: Bed Scale  Weight: 95.5 kg (210 lb 8.6 oz)    Ideal Body Weight (IBW), Male: 172 lb  % Ideal Body Weight, Male (lb): 122.41 lb     BMI (Calculated): 29.4  BMI Grade: 25 - 29.9 - overweight    Estimated/Assessed Needs    Weight Used For Calorie Calculations: 95.5 kg (210 lb 8.6 oz)   Height (cm): 180.3 cm     Energy Need Method: [facility], other (see comments) (2240 kcal/d)     Weight Used For Protein Calculations: 95.5 kg (210 lb 8.6 oz)  Protein Requirements: 77-96 g/d    Fluid Need Method: RDA Method (or per MD)    Assessment and " Plan    Nutrition knowledge deficit r/t uncontrolled diabetes AEB A1c 11.4, pt consuming general diet.    Monitor and Evaluation    Food and Nutrient Intake: energy intake, food and beverage intake  Food and Nutrient Adminstration: diet order  Knowledge/Beliefs/Attitudes: food and nutrition knowledge/skill  Physical Activity and Function: nutrition-related ADLs and IADLs  Anthropometric Measurements: weight, weight change, body mass index  Biochemical Data, Medical Tests and Procedures: electrolyte and renal panel, gastrointestinal profile, glucose/endocrine profile, inflammatory profile, lipid profile  Nutrition-Focused Physical Findings: overall appearance    Nutrition Risk    Level of Risk:  (1 x per week)    Nutrition Follow-Up    RD Follow-up?: Yes

## 2017-06-02 NOTE — NURSING
Pt had a BM, stool was black and tarry. Notified Med team, spoke with MD Garrett. Sent off occult blood order that was ordered on 5/30. Pt is asymptomatic. VSS, AAOX4. WCTM.

## 2017-06-02 NOTE — CONSULTS
"[facility]  Rheumatology  Consult Note    Patient Name: Bessy Nunez  MRN: [id number]  Admission Date: 5/23/2017  Hospital Length of Stay: 10 days  Code Status: Full Code   Attending Provider: Andrei Cornelius MD  Primary Care Physician: Edgar Nova MD  Principal Problem:Encephalopathy    Inpatient consult to Rheumatology  Consult performed by: FARIBA JURADO  Consult ordered by: KAREN CORREA II        Subjective:     HPI: 56 yo M with PMH poorly controlled DM2, HLD, HTN, CVA, CHASE, internuclear ophthalmoplegia of the L eye seen in consult for possible vasculitis with cerebral angiogram showing multifocal areas of stenosis.     He was initially admitted 5/13-5/18 with JAYDEN and diagnosed with MS. MRA brain/neck, MRI brain w/wo contrast showed no vascular occlusion, multiple foci of hyperintensity in deep cerebral periventricular white matter in pattern of demyelinating process. He was given 5 days of 1g IV solumedrol. His symptoms included double vision. After solumedrol he had improvement in symptoms and DC'd 5/18.    On 5/24 he was readmitted with worsening double vision, dizziness, syncope and confusion. On 5/28 he had waxing and waning mental status, MRI suggestive of active demyelination vs ischemia if multiple areas. Since then continues to have intermittent aphasia, inattention and delayed response.    He currently says he feels well and wants to go home. His only complaint is mild blurry vision. He denies ever having HA, fever, nosebleeds, oral/nasal ulcers, LNs, rash, tingling, numbness, weakness, CP/SOB, cough, joint pain or swelling, dysuria, blood in stool or urine.    Mentions that he had a "stroke" 16 years ago and that symptoms were just memory loss.    ESR 5/25 was 15, CRP 5, acl abs-, CK 18, ACE 23, phosphatidyl serine -, TSH nl, REYMUNDO - x 2, ANCA - x 2, C3, C4 nl, NMO -, SPEP no monoclonal bands, lyme, HIV, HBV/HCV, [place], RPR negative, UA nl, CSF showed RBC 9, WBC 4, glu " 280, pro 93, nl ACE, cytology negative.    He also has a duodenal ulcer treated with cautery on 5/30.          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    Mixed hyperlipidemia 11/9/2012    NAFLD (nonalcoholic fatty liver disease) 10/11/2013    CHASE (nonalcoholic steatohepatitis)     Obesity     Stroke     Type 2 diabetes mellitus with diabetic polyneuropathy, with long-term current use of insulin 5/14/2017       Past Surgical History:   Procedure Laterality Date    SKIN CANCER EXCISION         Immunization History   Administered Date(s) Administered    Influenza Split 11/22/2013       Review of patient's allergies indicates:  No Known Allergies  Current Facility-Administered Medications   Medication Frequency    acetaminophen tablet 650 mg Q6H PRN    atorvastatin tablet 40 mg Daily    dextrose 50% injection 12.5 g PRN    dextrose 50% injection 12.5 g PRN    dextrose 50% injection 12.5 g PRN    dextrose 50% injection 25 g PRN    diltiaZEM tablet 60 mg Q8H    glucagon (human recombinant) injection 1 mg PRN    glucagon (human recombinant) injection 1 mg PRN    glucagon (human recombinant) injection 1 mg PRN    glucose chewable tablet 16 g PRN    glucose chewable tablet 24 g PRN    insulin aspart pen 0-5 Units Q6H PRN    insulin aspart pen 12 Units TIDWM    insulin detemir pen 38 Units QHS    levetiracetam tablet 500 mg BID    omnipaque oral solution 15 mL PRN    pantoprazole EC tablet 40 mg BID    sertraline tablet 25 mg Daily    sodium chloride 0.9% bolus 1,000 mL Once    vitamin D 1000 units tablet 5,000 Units Daily     Family History     Problem Relation (Age of Onset)    Cancer Father    Diabetes Maternal Aunt    Heart disease Mother    Heart failure Mother    Hypertension Mother        Social History Main Topics    Smoking status: Never Smoker    Smokeless tobacco: Never Used    Alcohol use No     Drug use: No    Sexual activity: Not on file     Review of Systems   Constitutional: Negative for activity change, chills, fatigue and fever.   HENT: Negative for mouth sores and trouble swallowing.    Eyes: Positive for visual disturbance. Negative for pain and redness.   Respiratory: Negative for chest tightness and shortness of breath.    Cardiovascular: Negative for chest pain.   Gastrointestinal: Negative for abdominal pain, diarrhea and nausea.   Genitourinary: Negative for dysuria.   Musculoskeletal: Positive for arthralgias and joint swelling. Negative for myalgias.   Skin: Negative for color change and rash.   Neurological: Negative for weakness and numbness.   Hematological: Negative for adenopathy.     Objective:     Vital Signs (Most Recent):  Temp: 98.1 °F (36.7 °C) (06/02/17 1300)  Pulse: 77 (06/02/17 1300)  Resp: 20 (06/02/17 1300)  BP: 128/75 (06/02/17 1300)  SpO2: 95 % (06/02/17 1300)  O2 Device (Oxygen Therapy): room air (06/02/17 1300) Vital Signs (24h Range):  Temp:  [97.3 °F (36.3 °C)-98.5 °F (36.9 °C)] 98.1 °F (36.7 °C)  Pulse:  [44-82] 77  Resp:  [8-20] 20  SpO2:  [94 %-100 %] 95 %  BP: (123-162)/(64-90) 128/75     Weight: 95.5 kg (210 lb 8.6 oz) (06/02/17 1000)  Body mass index is 29.36 kg/m².  Body surface area is 2.19 meters squared.      Intake/Output Summary (Last 24 hours) at 06/02/17 1402  Last data filed at 06/02/17 1300   Gross per 24 hour   Intake              600 ml   Output               40 ml   Net              560 ml       Physical Exam   Constitutional: He is oriented to person, place, and time and well-developed, well-nourished, and in no distress. No distress.   HENT:   Head: Normocephalic and atraumatic.   Mouth/Throat: No oropharyngeal exudate.   Eyes: Conjunctivae are normal. No scleral icterus.   L lid droop    Neck: Normal range of motion. Neck supple. No thyromegaly present.   Cardiovascular: Normal rate, regular rhythm and normal heart sounds.    Pulmonary/Chest:  Effort normal and breath sounds normal.   Abdominal: Soft. There is no tenderness.       Right Side Rheumatological Exam     Examination finds the shoulder, elbow, wrist, knee, 1st PIP, 1st MCP, 2nd PIP, 2nd MCP, 3rd PIP, 3rd MCP, 4th PIP, 4th MCP, 5th PIP and 5th MCP normal.    Left Side Rheumatological Exam     Examination finds the shoulder, elbow, wrist, knee, 1st PIP, 1st MCP, 2nd PIP, 2nd MCP, 3rd PIP, 3rd MCP, 4th PIP, 4th MCP, 5th PIP and 5th MCP normal.      Lymphadenopathy:     He has no cervical adenopathy.   Neurological: He is alert and oriented to person, place, and time. No cranial nerve deficit. GCS score is 15.   Skin: Skin is warm and dry. No rash noted.     Musculoskeletal: Normal range of motion. He exhibits no edema or tenderness.         Significant Labs:  CBC:   Recent Labs  Lab 06/02/17  0511   WBC 5.51  5.51  5.51   HGB 9.1*  9.1*  9.1*   HCT 26.1*  26.1*  26.1*     164  164     CMP:   Recent Labs  Lab 06/02/17  0511   *   CALCIUM 7.9*   ALBUMIN 2.4*   PROT 5.7*      K 3.8   CO2 21*   *   BUN 10   CREATININE 1.0   ALKPHOS 82   ALT 21   AST 19   BILITOT 0.5     Coagulation: No results for input(s): INR, APTT in the last 24 hours.    Invalid input(s): PT  CPK: No results for input(s): CPK in the last 24 hours.  CRP: No results for input(s): CRP in the last 24 hours.  ESR: No results for input(s): SEDRATE in the last 24 hours.  Urinalysis: No results for input(s): COLORU, CLARITYU, SPECGRAV, PHUR, PROTEINUA, GLUCOSEU, BILIRUBINCON, BLOODU, WBCU, RBCU, BACTERIA, MUCUS, NITRITE, LEUKOCYTESUR, UROBILINOGEN, HYALINECASTS in the last 24 hours.  All pertinent lab results from the last 24 hours have been reviewed.    Significant Imaging:  Imaging results within the past 24 hours have been reviewed.     IR Angiogram Carotid Cerebral Bilateral   Status: Final result   [employer] Results Release     [employer] Status: Active Results Release   Signed by     Signed Credentials  Date/Time  Phone Pager   ROSANGELA WREN MD 6/02/2017 09:22 [phone]   PACS Images     Show images for IR Angiogram Carotid Cerebral Bilateral   External Result Report     External Result Report   Narrative     EXAM:   Cerebral angiogram.     CPT CODES:  49328, 30715, 99036,     CLINICAL HISTORY:  Patient is a 57-year-old male, referred for evaluation of multiple recent infarctions.    PROCEDURE COMMENT:   Informed consent was obtained prior to the examination. A timeout was performed.      Sedation: Light sedation was provided and the patient was monitored by an independent radiology nurse.     The right groin was prepared using standard sterile technique and a right common femoral artery puncture was performed with a micropuncture needle. A 5F sheath was placed and connected to saline flush.  Through this sheath a 5F Edi catheter was advanced over a wire and used to perform selective angiography of the following vessels: Bilateral common carotid, bilateral internal carotid, and left vertebral artery.    FINDINGS:  The right common carotid artery was selected and an angiogram was performed. Angiogram demonstrates no stenosis at the bifurcation.    The right internal carotid artery was selected and an angiogram was performed. Angiogram demonstrates no aneurysm or occlusion.  There are multifocal stenoses involving mid to distal branches of the middle and anterior cerebral arteries.  The distal internal carotid artery shows mild irregularity without hemodynamically significant stenosis.    The left common carotid artery was selected and an angiogram was performed. Angiogram demonstrates no stenosis at the bifurcation, there is an acute bend in the proximal left internal carotid artery.  Therefore angiography of the brain was performed during injection of the common carotid artery.  This revealed multifocal areas of stenosis involving the mid to distal branches of the middle and anterior  cerebral arteries, most notably the left A2 segment where there is at least 80% focal stenosis.  This angiogram also demonstrated similar appearing areas of narrowing involving at least one external carotid artery branch.    The left vertebral artery was selected and an angiogram was performed. Angiogram demonstrates an infundibulum of the proximal basilar artery.  Multifocal stenoses are noted, particularly of the superior cerebellar arteries as well as the P1 and P2 segments of both posterior cerebral arteries.      The venous drainage pattern was within normal limits.    The catheter was removed and an angiogram was performed through the femoral sheath demonstrating an access site appropriate for closure device.    Hemostasis was obtained in the right groin using the Exoseal device.  The total contrast dose for the procedure was: 60 cc of Visipaque.  The total radiation dose was approximately: 11,182 cGycm2.  Physicians in the procedure:  Dr. Rosangela Wren, Dr. Parrish Burgos. Images and report were permanently recorded.   Impression       Multifocal areas of stenosis involving the intracranial anterior and posterior circulation most notably in the left A2 segment and the superior cerebellar arteries as detailed above. These have an appearance consistent with cerebral vasculitis.  However, multifocal atherosclerotic disease can have a very similar appearance.  Repeat angiography may be useful following a period of treatment if vasculitis is a clinical consideration.    ______________________________________     Electronically signed by resident: PARRISH BURGOS MD  Date: 06/01/17  Time: 18:01            As the supervising and teaching physician, I personally reviewed the images and resident's interpretation and I agree with the findings.          Electronically signed by: ROSANGELA WREN MD  Date: 06/02/17  Time: 09:22         MRI Brain W WO Contrast   Status: Final result   [employer] Results Release     [employer]  Status: Active Results Release   Signed by     Signed Credentials Date/Time  Phone Pager   LEN GÓMEZ DO 5/29/2017 07:58 [phone]   PACS Images     Show images for MRI Brain W WO Contrast   External Result Report     External Result Report   Narrative     Procedure: MRI the brain with andwithout contrast.    Technique: Sagittal and axial T1, axial T2, axial FLAIR, axial gradient, axial diffusion, and axial, sagittal, and coronal postcontrast T1 images of the whole brain. 10 ml of Gadavist injected intravenously.         Comparison: 05/26/2017    Findings: Study is again limited by motion specifically on the post contrast imaging.  Overall the regions of restricted diffusion right inferior frontal lobe and right posterior temporal occipital lobes with punctate foci in the left centrum semiovale, posterior medial temporal lobe, left cerebellum and left brachium pontis similar to prior.  Allowing for motion limitation there is no definite enhancement in these regions.  There is continued superimposed T2 flair signal hyperintensity throughout the supratentorial white matter which may be sequela of advanced chronic microvascular ischemic change and prior infarcts versus prior region of demyelination.    There is a small linear focus of enhancement in the left paramedian dorsal jose with corresponding flair hyperintensity and subtle diffusion hyperintensity which may be an active area of demyelination however again is limited by motion.    There is a small focus of susceptibility within the left cerebellum suggestive for remote microhemorrhage.    Small areas of cystic encephalomalacia in the basal ganglia bilaterally suggestive for remote lacunar-type infarcts versus encephalomalacia from prior demyelination.  Allowing for motion limitation compared to recent prior there is no definite significant new lesion.  Electronic notification system activated.    There is continued flair hyperintensity  along the undersurface of the corpus callosum diffusely with several lesions extend to the margin of the body of the corpus callosum.   Impression      Small to punctate-sized scattered foci of signal abnormality cerebral hemispheres, left cerebellum and brainstem similarly seen on the prior allowing for motion limitation.  This is superimposed on confluent regions of T2 flair signal abnormality supratentorial white matter and jose in light of corpus callosal involvement most suggestive for areas of recent or active demyelination superimposed on prior demyelination.  However alternative differential including areas of infarction with remote lacunar-type infarcts and age advanced chronic microvascular ischemic changes not excluded.    There is a small focus enhancement in the left dorsal jose differential to include but not limited to active demyelination versus subacute infarction.    Allowing for motion compared to prior there is no significant interval change.  No hydrocephalus or midline shift.    Clinical correlation and follow evaluation with additional imaging with alternative sedation methods to be considered in light of persistent motion limitation.      Electronically signed by: LEN GÓMEZ DO  Date: 05/29/17  Time: 07:58             Assessment/Plan:     * Encephalopathy    58 yo M with PMH poorly controlled DM2, HLD, HTN, CVA, CHASE, internuclear ophthalmoplegia of the L eye seen in consult for possible vasculitis with cerebral angiogram showing multifocal areas of stenosis. Symptoms have included blurry vision and encephalopathy. MRI suggestive of demyelinating disease. Thorough autoimmune workup this far has been negative including REYMUNDO x 2, ANCA x 2, ACE, acl ab, C3, C4, SPEP, NMO also no clinical sign of systemic vasculitis. Infectious workup negative as well. PACNS is extremely rare and against it is elevated glucose (normal in PACNS) and lack of HA throughout which is most common feature of PACNS. He  also developed worsening symptoms requiring readmission after 5 days of pulse steroids. Given history of poorly controlled DM, HTN, HLD, obesity, suspect angiogram findings more likely related to atherosclerosis. Also pt was unable to have GIOVANNI so cannot rule out embolic etiology.     -To complete autoimmune workup will check B2-glycoprotein, cryos            Thank you for your consult. I will follow-up with patient. Please contact us if you have any additional questions.    Zan Levin,   Rheumatology  [facility]    Patient seen and examined with fellow.  All elements of history, physical exam and medical decision making independently confirmed by me.  Consult for possible CNS vasculitis in this complex patient with newly diagnosed multiple sclerosis, and poorly controlled DM2, HLD, HTN, CVA, CHASE, internuclear ophthalmoplegia of the L eye with cerebral angiogram showing multifocal areas of stenosis.  Patient with negative rheumatologic work up thus far. Clinical picture so far does not favor CNS vasculitis (sudden onset symptoms, no history of HA, symptoms worsened after pulse steroids).  Proceed with additional labs as above.  Will await today's specialized MRI study (black blood study) to help distinguish between vasculitis and atherosclerosis as cause of changes on angiogram.  Will follow with you. See note for details.

## 2017-06-02 NOTE — PROGRESS NOTES
Pharmacist Intervention IV to PO Note    Bessy Nunez is a 57 y.o. male being treated with IV medication pantoprazole    Patient Data:    Vital Signs (Most Recent):  Temp: 98.3 °F (36.8 °C) (06/02/17 0900)  Pulse: 81 (06/02/17 1100)  Resp: 18 (06/02/17 0900)  BP: 123/66 (06/02/17 0900)  SpO2: (!) 94 % (06/02/17 0900)   Vital Signs (72h Range):  Temp:  [97.3 °F (36.3 °C)-99.3 °F (37.4 °C)]   Pulse:  [44-98]   Resp:  [8-32]   BP: (115-178)/(58-90)   SpO2:  [93 %-100 %]      CBC:    Recent Labs     Lab 05/31/17 0257 06/01/17 0442 06/02/17  0511   WBC 5.38  5.38  5.38 5.14  5.14  5.14 5.51  5.51  5.51   RBC 3.27*  3.27*  3.27* 3.19*  3.19*  3.19* 2.97*  2.97*  2.97*   HGB 9.7*  9.7*  9.7* 9.4*  9.4*  9.4* 9.1*  9.1*  9.1*   HCT 28.2*  28.2*  28.2* 27.8*  27.8*  27.8* 26.1*  26.1*  26.1*   *  142*  142* 154  154  154 164  164  164   MCV 86  86  86 87  87  87 88  88  88   MCH 29.7  29.7  29.7 29.5  29.5  29.5 30.6  30.6  30.6   MCHC 34.4  34.4  34.4 33.8  33.8  33.8 34.9  34.9  34.9     CMP:     Recent Labs     Lab 05/31/17 0257 06/01/17 0442 06/02/17  0511   * 234* 232*   CALCIUM 8.3* 8.1* 7.9*   ALBUMIN 2.4* 2.4* 2.4*   PROT 5.3* 5.3* 5.7*    142 141   K 3.8 3.8 3.8   CO2 20* 21* 21*   * 111* 111*   BUN 10 12 10   CREATININE 1.0 0.9 1.0   ALKPHOS 86 78 82   ALT 26 23 21   AST 22 17 19   BILITOT 0.5 0.6 0.5       Dietary Orders:  Diet Orders            Diet Diabetic 2200 Calories: Diabetic 2200 starting at 06/01 1728            Based on the following criteria, this patient qualifies for intravenous to oral conversion:  [x] The patients gastrointestinal tract is functioning (tolerating medications via oral or enteral route for 24 hours and tolerating food or enteral feeds for 24 hours).  [x] The patient is hemodynamically stable for 24 hours (heart rate <100 beats per minute, systolic blood pressure >99 mm Hg, and respiratory rate <20 breaths  per minute).  [x] The patient shows clinical improvement (afebrile for at least 24 hours and white blood cell count downtrending or normalized). Additionally, the patient must be non-neutropenic (absolute neutrophil count >500 cells/mm3).  [x] For antimicrobials, the patient has received IV therapy for at least 24 hours.    IV medication pantoprazole 40 mg 2 times daily will be changed to oral medication pantoprazole 40 mg 2 times daily    Pharmacist's Name: Mari Tavera  Pharmacist's Extension: [phone]

## 2017-06-02 NOTE — NURSING
Spoke to Dr. Mendoza re: DCing pt's tele. States to maintain tele at this time. States will re-order. Will continue to monitor.    1500: Spoke to Dr. Mendoza, re-requested tele order be entered. States will enter order. Made [facility] RN aware.

## 2017-06-02 NOTE — SUBJECTIVE & OBJECTIVE
Neurologic Chief Complaint: MS vs cardio-embolic vs watershade stroke     Subjective:     Interval History: Patient is seen for follow-up neurological assessment and treatment recommendations:     HALINA.     HPI, Past Medical, Family, and Social History remains the same as documented in the initial encounter.     Review of Systems  Scheduled Meds:   atorvastatin  40 mg Oral Daily    diltiaZEM  60 mg Oral Q8H    insulin aspart  12 Units Subcutaneous TIDWM    insulin detemir  38 Units Subcutaneous QHS    levetiracetam  500 mg Oral BID    pantoprazole  40 mg Intravenous BID    sertraline  25 mg Oral Daily    sodium chloride 0.9%  1,000 mL Intravenous Once    vitamin D  5,000 Units Oral Daily     Continuous Infusions:     PRN Meds:acetaminophen, dextrose 50%, dextrose 50%, dextrose 50%, dextrose 50%, glucagon (human recombinant), glucagon (human recombinant), glucagon (human recombinant), glucose, glucose, insulin aspart, omnipaque    Objective:     Vital Signs (Most Recent):  Temp: 98.3 °F (36.8 °C) (06/02/17 0900)  Pulse: 81 (06/02/17 1100)  Resp: 18 (06/02/17 0900)  BP: 123/66 (06/02/17 0900)  SpO2: (!) 94 % (06/02/17 0900)  BP Location: Right arm    Vital Signs Range (Last 24H):  Temp:  [97.3 °F (36.3 °C)-98.5 °F (36.9 °C)]   Pulse:  [44-82]   Resp:  [8-18]   BP: (115-162)/(58-90)   SpO2:  [94 %-100 %]   BP Location: Right arm    Physical Exam   Constitutional: He appears well-developed and well-nourished.   HENT:   Head: Normocephalic and atraumatic.   Eyes: Conjunctivae and EOM are normal. Pupils are equal, round, and reactive to light.   Neck: Normal range of motion. Neck supple.   Cardiovascular: Normal rate and regular rhythm.    Murmur (systolic) heard.  Pulmonary/Chest: Effort normal and breath sounds normal.   Abdominal: Soft. Bowel sounds are normal. He exhibits no distension and no mass. There is no tenderness.   Musculoskeletal: He exhibits no edema or deformity.   Neurological: He is alert. No  cranial nerve deficit. Coordination normal. GCS eye subscore is 4. GCS verbal subscore is 5. GCS motor subscore is 6.   Skin: Skin is warm and dry. No rash noted. No erythema.   Psychiatric: He has a normal mood and affect. His behavior is normal.   Nursing note and vitals reviewed.      Jarod Coma Scale:  Best Eye Response: 4  Best Motor Response: 6  Best Verbal Response: 5        Laboratory:  CMP:     Recent Labs  Lab 06/02/17  0511   CALCIUM 7.9*   ALBUMIN 2.4*   PROT 5.7*      K 3.8   CO2 21*   *   BUN 10   CREATININE 1.0   ALKPHOS 82   ALT 21   AST 19   BILITOT 0.5     CBC:     Recent Labs  Lab 06/02/17  0511   WBC 5.51  5.51  5.51   RBC 2.97*  2.97*  2.97*   HGB 9.1*  9.1*  9.1*   HCT 26.1*  26.1*  26.1*     164  164   MCV 88  88  88   MCH 30.6  30.6  30.6   MCHC 34.9  34.9  34.9     Lipid Panel: No results for input(s): CHOL, LDLCALC, HDL, TRIG in the last 168 hours.  Hgb A1C: No results for input(s): HGBA1C in the last 168 hours.  TSH:   No results for input(s): TSH in the last 168 hours.

## 2017-06-02 NOTE — ASSESSMENT & PLAN NOTE
Patient with intermittent aphasia, inattention, and delayed response    Area in R frontal lobe concerning for possible new infarct. Patient has hyperdensity in DWI and correlating hypodensity in ADC. However this new lesion may reflect a new demyelinating lesion. Depending on stage of a demyelinating lesion, it could appear as an acute stroke on MRI. At this point, it is difficult to ascertain the cause of the R frontal lesion. Therefore, would treat as an acute stroke until proven otherwise.    Repeated MRI is limited 2/2 motion artifact, revealing multiple scattered foci suggesting demyelination vs stroke.    Neurology suspecting vasculitis, w/u underway. Cerebral angio showing multifocal stenosis consisting with vasculitis. Cardiology are not suspecting endocarditis and GIOVANNI recommended to be done in the out patient settings.

## 2017-06-02 NOTE — ASSESSMENT & PLAN NOTE
58 yo M with PMH poorly controlled DM2, HLD, HTN, CVA, CHASE, internuclear ophthalmoplegia of the L eye seen in consult for possible vasculitis with cerebral angiogram showing multifocal areas of stenosis. Symptoms have included blurry vision and encephalopathy. MRI suggestive of demyelinating disease. Thorough autoimmune workup this far has been negative including REYMUNDO x 2, ANCA x 2, ACE, acl ab, C3, C4, SPEP, NMO also no clinical sign of systemic vasculitis. Infectious workup negative as well. PACNS is extremely rare and against it is elevated glucose (normal in PACNS) and lack of HA throughout which is most common feature of PACNS. He also developed worsening symptoms requiring readmission after 5 days of pulse steroids. Given history of poorly controlled DM, HTN, HLD, obesity, suspect angiogram findings more likely related to atherosclerosis. Also pt was unable to have GIOVANNI so cannot rule out embolic etiology.     -To complete autoimmune workup will check B2-glycoprotein, cryos

## 2017-06-02 NOTE — NURSING
Pt to transfer to [facility] floor, MRI sending for pt. Per Neurology, OK to send pt to MRI prior to transfer. Pt to MRI via stretcher, MRI tech made aware of room/unit change to Rm 726 post-MRI.

## 2017-06-02 NOTE — PROGRESS NOTES
[facility]  Neurology  Progress Note    Patient Name: Bessy Nunez  MRN: [id number]  Admission Date: 5/23/2017  Hospital Length of Stay: 10 days  Code Status: Full Code   Attending Provider: Andrei Cornelius MD  Primary Care Physician: Edgar Nova MD   Principal Problem:Encephalopathy      Subjective:     Interval History: No acute events overnight     Current Facility-Administered Medications   Medication Dose Route Frequency Provider Last Rate Last Dose    acetaminophen tablet 650 mg  650 mg Oral Q6H PRN Gilberto Díaz MD   650 mg at 05/28/17 1243    atorvastatin tablet 40 mg  40 mg Oral Daily Donna Jason MD   40 mg at 06/01/17 0848    dextrose 50% injection 12.5 g  12.5 g Intravenous PRN Donna Jason MD   12.5 g at 05/28/17 0936    dextrose 50% injection 12.5 g  12.5 g Intravenous PRN William French, NP        dextrose 50% injection 12.5 g  12.5 g Intravenous PRN Cristina Harp MD        dextrose 50% injection 25 g  25 g Intravenous PRN Donna Jason MD        diltiaZEM tablet 60 mg  60 mg Oral Q8H Guanaco Story MD   60 mg at 06/01/17 0558    glucagon (human recombinant) injection 1 mg  1 mg Intramuscular PRN Donna Jason MD        glucagon (human recombinant) injection 1 mg  1 mg Intramuscular PRN William French NP        glucagon (human recombinant) injection 1 mg  1 mg Intramuscular PRN Cristina Harp MD        glucose chewable tablet 16 g  16 g Oral PRN Donna Jason MD        glucose chewable tablet 24 g  24 g Oral PRN Donna Jason MD        insulin aspart pen 0-5 Units  0-5 Units Subcutaneous Q6H PRN Cristina Harp MD   1 Units at 06/01/17 0558    insulin aspart pen 1-10 Units  1-10 Units Subcutaneous QID (AC & HS) William French NP        insulin aspart pen 10 Units  10 Units Subcutaneous TIDWM William French NP   10 Units at 05/31/17 2019    insulin detemir pen 35  Units  35 Units Subcutaneous QHS Guanaco Story MD   35 Units at 05/31/17 2018    levetiracetam tablet 500 mg  500 mg Oral BID William French NP   500 mg at 06/01/17 0848    omnipaque oral solution 15 mL  15 mL Oral PRN Horace Hernandez MD        pantoprazole injection 40 mg  40 mg Intravenous BID William French NP   40 mg at 06/01/17 0848    sertraline tablet 25 mg  25 mg Oral Daily Andrei Cornelius MD   25 mg at 06/01/17 0923    sodium chloride 0.9% bolus 1,000 mL  1,000 mL Intravenous Once Guanaco Story MD        vitamin D 1000 units tablet 5,000 Units  5,000 Units Oral Daily Guanaco Story MD   5,000 Units at 06/01/17 0848       Review of Systems   Constitutional: Positive for activity change.   Eyes: Positive for visual disturbance.   Respiratory: Negative for shortness of breath.    Cardiovascular: Negative for chest pain.   Gastrointestinal: Negative for nausea and vomiting.   Skin: Negative for rash and wound.   Psychiatric/Behavioral: Positive for confusion. Negative for agitation and behavioral problems.     Objective:     Vital Signs (Most Recent):  Temp: 98.4 °F (36.9 °C) (06/01/17 1256)  Pulse: 65 (06/01/17 1645)  Resp: 18 (06/01/17 1645)  BP: (!) 153/76 (06/01/17 1645)  SpO2: 100 % (06/01/17 1645) Vital Signs (24h Range):  Temp:  [97.6 °F (36.4 °C)-98.7 °F (37.1 °C)] 98.4 °F (36.9 °C)  Pulse:  [44-78] 65  Resp:  [8-18] 18  SpO2:  [96 %-100 %] 100 %  BP: (115-162)/(58-90) 153/76     Weight: 95.1 kg (209 lb 10.5 oz)  Body mass index is 29.24 kg/m².    Physical Exam   Constitutional: He appears well-developed and well-nourished. No distress.   HENT:   Head: Normocephalic and atraumatic.   Pulmonary/Chest: Effort normal.   Neurological: He is alert. He has a normal Finger-Nose-Finger Test.   Reflex Scores:       Bicep reflexes are 1+ on the right side and 1+ on the left side.       Brachioradialis reflexes are 1+ on the right side and 1+ on the left side.       Patellar  reflexes are 1+ on the right side and 1+ on the left side.  Skin: Skin is warm and dry. He is not diaphoretic.   Psychiatric: His speech is normal.   More interactive today.   Nursing note and vitals reviewed.      NEUROLOGICAL EXAMINATION:     MENTAL STATUS   Attention: decreased. Concentration: normal.   Speech: speech is normal   Level of consciousness: alert       Able to spell WORLD forwards but not backwards.  Cannot subtract 7 from 100.     CRANIAL NERVES     CN II   Visual fields full to confrontation.     CN V   Facial sensation intact.     CN VII   Facial expression full, symmetric.     CN VIII   Hearing: intact    CN XI   Right sternocleidomastoid strength: normal  Left sternocleidomastoid strength: normal    CN XII   Tongue: not atrophic  Fasciculations: absent  Tongue deviation: none       Mild L JAYDEN.  Improving.     MOTOR EXAM   Muscle bulk: normal  Overall muscle tone: normal    Strength   Right biceps: 5/5  Left biceps: 5/5  Right triceps: 5/5  Left triceps: 5/5  Right quadriceps: 5/5  Left quadriceps: 4/5       Weaker on LLE than RLE.       REFLEXES     Reflexes   Right brachioradialis: 1+  Left brachioradialis: 1+  Right biceps: 1+  Left biceps: 1+  Right patellar: 1+  Left patellar: 1+    SENSORY EXAM   Light touch normal.     GAIT AND COORDINATION      Coordination   Finger to nose coordination: normal      Significant Labs:   Hemoglobin A1c:     Recent Labs  Lab 05/13/17  0138   HGBA1C 11.4*     Blood Culture:   No results for input(s): LABBLOO in the last 48 hours.  BMP:     Recent Labs  Lab 05/31/17 0257 06/01/17  0442   * 234*    142   K 3.8 3.8   * 111*   CO2 20* 21*   BUN 10 12   CREATININE 1.0 0.9   CALCIUM 8.3* 8.1*   MG 1.8 1.5*     CBC:     Recent Labs  Lab 05/31/17 0257 06/01/17  0442   WBC 5.38  5.38  5.38 5.14  5.14  5.14   HGB 9.7*  9.7*  9.7* 9.4*  9.4*  9.4*   HCT 28.2*  28.2*  28.2* 27.8*  27.8*  27.8*   *  142*  142* 154  154  154      CMP:     Recent Labs  Lab 05/31/17  0257 06/01/17  0442   * 234*    142   K 3.8 3.8   * 111*   CO2 20* 21*   BUN 10 12   CREATININE 1.0 0.9   CALCIUM 8.3* 8.1*   MG 1.8 1.5*   PROT 5.3* 5.3*   ALBUMIN 2.4* 2.4*   BILITOT 0.5 0.6   ALKPHOS 86 78   AST 22 17   ALT 26 23   ANIONGAP 8 10   EGFRNONAA >60.0 >60.0     CSF Culture: No results for input(s): CSFCULTURE in the last 48 hours.  CSF Studies: No results for input(s): ALIQUT, APPEARCSF, COLORCSF, CSFWBC, CSFRBC, GLUCCSF, LDHCSF, PROTEINCSF, VDRLCSF in the last 48 hours.  Inflammatory Markers: No results for input(s): SEDRATE, CRP, PROCAL in the last 48 hours.  POCT Glucose:     Recent Labs  Lab 06/01/17  0002 06/01/17  0556 06/01/17  1135   POCTGLUCOSE 232* 254* 227*     Respiratory Culture: No results for input(s): GSRESP, RESPIRATORYC in the last 48 hours.  Urine Culture: No results for input(s): LABURIN in the last 48 hours.  Urine Studies: No results for input(s): COLORU, APPEARANCEUA, PHUR, SPECGRAV, PROTEINUA, GLUCUA, KETONESU, BILIRUBINUA, OCCULTUA, NITRITE, UROBILINOGEN, LEUKOCYTESUR, RBCUA, WBCUA, BACTERIA, SQUAMEPITHEL, HYALINECASTS in the last 48 hours.    Invalid input(s): WRIGHTSUR  Recent Lab Results       06/01/17  1135 06/01/17  0912 06/01/17  0556 06/01/17  0442 06/01/17  0002      Albumin    2.4(L)      Alkaline Phosphatase    78      ALT    23      Anion Gap    10      AST    17      Baso #    0.01      Basophil%    0.2      Total Bilirubin    0.6  Comment:  For infants and newborns, interpretation of results should be based  on gestational age, weight and in agreement with clinical  observations.  Premature Infant recommended reference ranges:  Up to 24 hours.............<8.0 mg/dL  Up to 48 hours............<12.0 mg/dL  3-5 days..................<15.0 mg/dL  6-29 days.................<15.0 mg/dL        BUN, Bld    12      Calcium    8.1(L)      Chloride    111(H)      CO2    21(L)      Creatinine    0.9      Differential  Method    Automated      eGFR if     >60.0      eGFR if non     >60.0  Comment:  Calculation used to obtain the estimated glomerular filtration  rate (eGFR) is the CKD-EPI equation. Since race is unknown   in our information system, the eGFR values for   -American and Non--American patients are given   for each creatinine result.        Eos #    0.1      Eosinophil%    2.1      Glucose    234(H)      Gran #    3.1      Gran%    59.9      Hematocrit    27.8(L)          27.8(L)          27.8(L)      Hemoglobin    9.4(L)          9.4(L)          9.4(L)      Lymph #    1.5      Lymph%    29.4      Magnesium    1.5(L)      [place] Miscellaneous Result  SEE COMMENTS  Comment:  Test                           Result   Flag  Unit    RefValue  --------------------------------------------------------------  Encephalopathy-Autoimmune Eval, S  Encephalopathy,  Interpretation, S  No informative autoantibodies were detected in this   evaluation. However, a negative result does not exclude   autoimmune encephalopathy, idiopathic or paraneoplastic.   Sensitivity and specificity of antibody testing are   enhanced by testing both serum and CSF.  NMDA-R Ab CBA, S             Negative               Negative  -------------------ADDITIONAL INFORMATION-------------------  This test was developed and its performance characteristics   determined by [facility] in a manner consistent with CLIA   requirements. This test has not been cleared or approved by   the U.S. Food and Drug Administration.  Neuronal (V-G) K+ Channel    0.00           nmol/L  <=0.02  Ab, S  -------------------ADDITIONAL INFORMATION-------------------  This test was developed and its performance characteristics   determined by [facility] in a manner consistent with CLIA   requirements. This test has not been cleared or approved by   the U.S. Food and Drug Administration.  LGI1-IgG CBA, S              Negative                Negative  -------------------ADDITIONAL INFORMATION-------------------  This test was developed and its performance characteristics   determined by [facility] in a manner consistent with CLIA   requirements. This test has not been cleared or approved by   the U.S. Food and Drug Administration.  CASPR2-IgG CBA, S            Negative               Negative  -------------------ADDITIONAL INFORMATION-------------------  This test was developed and its performance characteristics   determined by [facility] in a manner consistent with CLIA   requirements. This test has not been cleared or approved by   the U.S. Food and Drug Administration.  GAD65 Ab Assay, S            0.00           nmol/L  <= 0.02   -------------------ADDITIONAL INFORMATION-------------------  This test was developed and its performance characteristics   determined by [facility] in a manner consistent with CLIA   requirements. This test has not been cleared or approved by   the U.S. Food and Drug Administration.  JAKUB-B-R Ab CBA, S           Negative               Negative  -------------------ADDITIONAL INFORMATION-------------------  This test was developed and its performance characteristics   determined by [facility] in a manner consistent with CLIA   requirements. This test has not been cleared or approved by   the U.S. Food and Drug Administration.  AMPA-R Ab CBA, S             Negative               Negative  -------------------ADDITIONAL INFORMATION-------------------  This test was developed and its performance characteristics   determined by [facility] in a manner consistent with CLIA   requirements. This test has not been cleared or approved by   the U.S. Food and Drug Administration.  ALL-1, S                    Negative       titer   <1:240    Reflex Added                 None.                            -------------------ADDITIONAL INFORMATION-------------------  This test was developed and its performance characteristics    determined by [facility] in a manner consistent with CLIA   requirements. This test has not been cleared or approved by   the U.S. Food and Drug Administration.  ALL-2, S                    Negative       titer   <1:240    -------------------ADDITIONAL INFORMATION-------------------  This test was developed and its performance characteristics   determined by [facility] in a manner consistent with CLIA   requirements. This test has not been cleared or approved by   the U.S. Food and Drug Administration.  ALL-3, S                    Negative       titer   <1:240    -------------------ADDITIONAL INFORMATION-------------------  This test was developed and its performance characteristics   determined by [facility] in a manner consistent with CLIA   requirements. This test has not been cleared or approved by   the U.S. Food and Drug Administration.  AGNA-1, S                    Negative       titer   <1:240    -------------------ADDITIONAL INFORMATION-------------------  This test was developed and its performance characteristics   determined by [facility] in a manner consistent with CLIA   requirements. This test has not been cleared or approved by   the U.S. Food and Drug Administration.  PCA-1, S                     Negative       titer   <1:240    -------------------ADDITIONAL INFORMATION-------------------  This test was developed and its performance characteristics   determined by [facility] in a manner consistent with CLIA   requirements. This test has not been cleared or approved by   the U.S. Food and Drug Administration.  PCA-2, S                     Negative       titer   <1:240    -------------------ADDITIONAL INFORMATION-------------------  This test was developed and its performance characteristics   determined by [facility] in a manner consistent with CLIA   requirements. This test has not been cleared or approved by   the U.S. Food and Drug Administration.  PCA-Tr, S                    Negative        titer   <1:240    -------------------ADDITIONAL INFORMATION-------------------  This test was developed and its performance characteristics   determined by [facility] in a manner consistent with CLIA   requirements. This test has not been cleared or approved by   the U.S. Food and Drug Administration.  Amphiphysin Ab, S            Negative       titer   <1:240    -------------------ADDITIONAL INFORMATION-------------------  This test was developed and its performance characteristics   determined by [facility] in a manner consistent with CLIA   requirements. This test has not been cleared or approved by   the U.S. Food and Drug Administration.  N-Type Calcium Channel Ab    0.00           nmol/L  <=0.03    -------------------ADDITIONAL INFORMATION-------------------  This test was developed and its performance characteristics   determined by [facility] in a manner consistent with CLIA   requirements. This test has not been cleared or approved by   the U.S. Food and Drug Administration.  P/Q-Type Calcium Channel Ab  0.00           nmol/L  <=0.02    -------------------ADDITIONAL INFORMATION-------------------  This test was developed and its performance characteristics   determined by [facility] in a manner consistent with CLIA   requirements. This test has not been cleared or approved by   the U.S. Food and Drug Administration.  ACh Receptor (Muscle)        0.00           nmol/L  <=0.02  Binding Ab  -------------------ADDITIONAL INFORMATION-------------------  This test was developed and its performance characteristics   determined by [facility] in a manner consistent with CLIA   requirements. This test has not been cleared or approved by   the U.S. Food and Drug Administration.  AChR Ganglionic Neuronal     0.00           nmol/L  <=0.02  Ab, S  -------------------ADDITIONAL INFORMATION-------------------  This test was developed and its performance characteristics   determined by [facility] in a manner  consistent with CLIA   requirements. This test has not been cleared or approved by   the U.S. Food and Drug Administration.  CRMP-5-IgG, S                Negative       titer   <1:240    -------------------ADDITIONAL INFORMATION-------------------  This test was developed and its performance characteristics   determined by [facility] in a manner consistent with CLIA   requirements. This test has not been cleared or approved by   the U.S. Food and Drug Administration.  Test Performed by:  [facility] - [address] [address]          MCH    29.5          29.5          29.5      MCHC    33.8          33.8          33.8      MCV    87          87          87      Mono #    0.4      Mono%    8.0      MPV    8.4(L)          8.4(L)          8.4(L)      Phosphorus    2.8      Platelets    154          154          154      POCT Glucose 227(H)  254(H)  232(H)     Potassium    3.8      Total Protein    5.3(L)      RBC    3.19(L)          3.19(L)          3.19(L)      RDW    15.1(H)          15.1(H)          15.1(H)      Sodium    142      WBC    5.14          5.14          5.14                  05/31/17 2015 05/31/17  1929 05/31/17  1812      Albumin        Alkaline Phosphatase        ALT        Anion Gap        AST        Baso #        Basophil%        Total Bilirubin        BUN, Bld        Calcium        Chloride        CO2        Creatinine        Differential Method        eGFR if         eGFR if non         Eos #        Eosinophil%        Glucose        Gran #        Gran%        Hematocrit        Hemoglobin        Lymph #        Lymph%        Magnesium        [place] Miscellaneous Result        MCH        MCHC        MCV        Mono #        Mono%        MPV        Phosphorus        Platelets        POCT Glucose 271(H) 250(H) 294(H)     Potassium        Total Protein        RBC        RDW        Sodium        WBC               Significant Imaging:  MRI brain 5/13/17:  Multiple foci of FLAIR hyperintensity in the deep cerebral periventricular white matter in a configuration and distribution which can be seen in the setting of underlying demyelinating process such as multiple sclerosis. There are several punctate foci of restricted diffusion including the left aspect of the midbrain tectum which in light of the aforementioned white matter changes may reflect regions of recent demyelination. Superimposed small acute infarcts would be difficult to exclude, although are felt less likely given the overall constellation of findings. Additionally, there is a 0.7 cm enhancing T2/FLAIR identified in the anterior right thalamus concerning for focus of active demyelination.           Also old lesions in corpus collosum         CTH 5/26/17  No detrimental change in this patient with volume loss, and multiple areas of hyperattenuation consistent with a infarcts, and periventricular hypoattenuation likely related to the patient's demyelinating disease.    MRI Brain 5/26/17  Please note evaluation is markedly limited by motion artifact, and the patient could not complete the exam.  However, the limited diffusion MRI findings are most compatible with acute infarction involving the right frontal and right temporal lobes as well as the left cerebellar hemisphere and left brainstem, as above.  No evidence of intracranial hemorrhage.    MRI Brain 5/28/17   Small to punctate-sized scattered foci of signal abnormality cerebral hemispheres, left cerebellum and brainstem similarly seen on the prior allowing for motion limitation.  This is superimposed on confluent regions of T2 flair signal abnormality supratentorial white matter and jose in light of corpus callosal involvement most suggestive for areas of recent or active demyelination superimposed on prior demyelination.  However alternative differential including areas of infarction with remote  lacunar-type infarcts and age advanced chronic microvascular ischemic changes not excluded.    There is a small focus enhancement in the left dorsal jose differential to include but not limited to active demyelination versus subacute infarction.    Allowing for motion compared to prior there is no significant interval change.  No hydrocephalus or midline shift.      Assessment and Plan:     Assessment and Plan:    Demyelinating changes in brain: DDx includes MS, Susac's syndrome, vasculitis, stroke.      -- As seen on numerous MRIs of the brain.   -- S/p 5.5g solumedrol last admission, followed by prednisone taper as an outpatient  -- CSF 5/14 negative for oligoclonal bands.  Normal IgG index.  WBC 2.  Protein 93 (slightly more elevated than would be expected for a demyelinating process, but not thought to represent meningitis/encephalitis; no antibiotics or antivirals started.  Consider vasculitis.)  -- MRA Head and Neck negative for any significant stenosis or occlusion  -- Continue empiric keppra 500mg bid for now    -- Angiogram: Multifocal areas of stenosis involving the intracranial anterior and posterior circulation most notably in the left A2 segment and the superior cerebellar arteries as detailed above. These have an appearance consistent with cerebral vasculitis.  However, multifocal atherosclerotic disease can have a very similar appearance.  Repeat angiography may be useful following a period of treatment if vasculitis is a clinical consideration.   -- F/u autoimmune encephalopathy panel      -- Follow MRA Brain with and without contrast: Black blood vessel wall imaging to rule out vasculitis (Discussed with Dr Bautista)   -- Will recommend GIOVANNI   -- Consulted Rheumatology    -- Transfer to vascular neurology       Saima Olguin II, MD  Neurology  [facility]

## 2017-06-02 NOTE — PT/OT/SLP PROGRESS
Speech Language Pathology      Bessy Nunez  MRN: [id number]    Patient not seen today secondary to CT/MRI. Will follow-up as scheduled.     Inna Cervantes, EDMOND-SLP   6/2/2017

## 2017-06-02 NOTE — SUBJECTIVE & OBJECTIVE
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    Mixed hyperlipidemia 11/9/2012    NAFLD (nonalcoholic fatty liver disease) 10/11/2013    CHASE (nonalcoholic steatohepatitis)     Obesity     Stroke     Type 2 diabetes mellitus with diabetic polyneuropathy, with long-term current use of insulin 5/14/2017       Past Surgical History:   Procedure Laterality Date    SKIN CANCER EXCISION         Immunization History   Administered Date(s) Administered    Influenza Split 11/22/2013       Review of patient's allergies indicates:  No Known Allergies  Current Facility-Administered Medications   Medication Frequency    acetaminophen tablet 650 mg Q6H PRN    atorvastatin tablet 40 mg Daily    dextrose 50% injection 12.5 g PRN    dextrose 50% injection 12.5 g PRN    dextrose 50% injection 12.5 g PRN    dextrose 50% injection 25 g PRN    diltiaZEM tablet 60 mg Q8H    glucagon (human recombinant) injection 1 mg PRN    glucagon (human recombinant) injection 1 mg PRN    glucagon (human recombinant) injection 1 mg PRN    glucose chewable tablet 16 g PRN    glucose chewable tablet 24 g PRN    insulin aspart pen 0-5 Units Q6H PRN    insulin aspart pen 12 Units TIDWM    insulin detemir pen 38 Units QHS    levetiracetam tablet 500 mg BID    omnipaque oral solution 15 mL PRN    pantoprazole EC tablet 40 mg BID    sertraline tablet 25 mg Daily    sodium chloride 0.9% bolus 1,000 mL Once    vitamin D 1000 units tablet 5,000 Units Daily     Family History     Problem Relation (Age of Onset)    Cancer Father    Diabetes Maternal Aunt    Heart disease Mother    Heart failure Mother    Hypertension Mother        Social History Main Topics    Smoking status: Never Smoker    Smokeless tobacco: Never Used    Alcohol use No    Drug use: No    Sexual activity: Not on file     Review of Systems   Constitutional: Negative for activity  change, chills, fatigue and fever.   HENT: Negative for mouth sores and trouble swallowing.    Eyes: Positive for visual disturbance. Negative for pain and redness.   Respiratory: Negative for chest tightness and shortness of breath.    Cardiovascular: Negative for chest pain.   Gastrointestinal: Negative for abdominal pain, diarrhea and nausea.   Genitourinary: Negative for dysuria.   Musculoskeletal: Positive for arthralgias and joint swelling. Negative for myalgias.   Skin: Negative for color change and rash.   Neurological: Negative for weakness and numbness.   Hematological: Negative for adenopathy.     Objective:     Vital Signs (Most Recent):  Temp: 98.1 °F (36.7 °C) (06/02/17 1300)  Pulse: 77 (06/02/17 1300)  Resp: 20 (06/02/17 1300)  BP: 128/75 (06/02/17 1300)  SpO2: 95 % (06/02/17 1300)  O2 Device (Oxygen Therapy): room air (06/02/17 1300) Vital Signs (24h Range):  Temp:  [97.3 °F (36.3 °C)-98.5 °F (36.9 °C)] 98.1 °F (36.7 °C)  Pulse:  [44-82] 77  Resp:  [8-20] 20  SpO2:  [94 %-100 %] 95 %  BP: (123-162)/(64-90) 128/75     Weight: 95.5 kg (210 lb 8.6 oz) (06/02/17 1000)  Body mass index is 29.36 kg/m².  Body surface area is 2.19 meters squared.      Intake/Output Summary (Last 24 hours) at 06/02/17 1402  Last data filed at 06/02/17 1300   Gross per 24 hour   Intake              600 ml   Output               40 ml   Net              560 ml       Physical Exam   Constitutional: He is oriented to person, place, and time and well-developed, well-nourished, and in no distress. No distress.   HENT:   Head: Normocephalic and atraumatic.   Mouth/Throat: No oropharyngeal exudate.   2+ temporal pulses, scalp nontender   Eyes: Conjunctivae are normal. No scleral icterus.   L lid droop    Neck: Normal range of motion. Neck supple. No thyromegaly present.   Cardiovascular: Normal rate, regular rhythm and normal heart sounds.    Pulmonary/Chest: Effort normal and breath sounds normal.   Abdominal: Soft. There is no  tenderness.       Right Side Rheumatological Exam     Examination finds the shoulder, elbow, wrist, knee, 1st PIP, 1st MCP, 2nd PIP, 2nd MCP, 3rd PIP, 3rd MCP, 4th PIP, 4th MCP, 5th PIP and 5th MCP normal.    Left Side Rheumatological Exam     Examination finds the shoulder, elbow, wrist, knee, 1st PIP, 1st MCP, 2nd PIP, 2nd MCP, 3rd PIP, 3rd MCP, 4th PIP, 4th MCP, 5th PIP and 5th MCP normal.      Lymphadenopathy:     He has no cervical adenopathy.   Neurological: He is alert and oriented to person, place, and time. No cranial nerve deficit. GCS score is 15.   Skin: Skin is warm and dry. No rash noted.     Musculoskeletal: Normal range of motion. He exhibits no edema or tenderness.         Significant Labs:  CBC:   Recent Labs  Lab 06/02/17  0511   WBC 5.51  5.51  5.51   HGB 9.1*  9.1*  9.1*   HCT 26.1*  26.1*  26.1*     164  164     CMP:   Recent Labs  Lab 06/02/17  0511   *   CALCIUM 7.9*   ALBUMIN 2.4*   PROT 5.7*      K 3.8   CO2 21*   *   BUN 10   CREATININE 1.0   ALKPHOS 82   ALT 21   AST 19   BILITOT 0.5     Coagulation: No results for input(s): INR, APTT in the last 24 hours.    Invalid input(s): PT  CPK: No results for input(s): CPK in the last 24 hours.  CRP: No results for input(s): CRP in the last 24 hours.  ESR: No results for input(s): SEDRATE in the last 24 hours.  Urinalysis: No results for input(s): COLORU, CLARITYU, SPECGRAV, PHUR, PROTEINUA, GLUCOSEU, BILIRUBINCON, BLOODU, WBCU, RBCU, BACTERIA, MUCUS, NITRITE, LEUKOCYTESUR, UROBILINOGEN, HYALINECASTS in the last 24 hours.  All pertinent lab results from the last 24 hours have been reviewed.    Significant Imaging:  Imaging results within the past 24 hours have been reviewed.     IR Angiogram Carotid Cerebral Bilateral   Status: Final result   MyChart Results Release     [facility] Status: Active Results Release   Signed by     Signed Credentials Date/Time  Phone Pager   ROSANGELA WREN MD 6/02/2017 09:22  [phone]   PACS Images     Show images for IR Angiogram Carotid Cerebral Bilateral   External Result Report     External Result Report   Narrative     EXAM:   Cerebral angiogram.     CPT CODES:  55676, 50603, 83542,     CLINICAL HISTORY:  Patient is a 57-year-old male, referred for evaluation of multiple recent infarctions.    PROCEDURE COMMENT:   Informed consent was obtained prior to the examination. A timeout was performed.      Sedation: Light sedation was provided and the patient was monitored by an independent radiology nurse.     The right groin was prepared using standard sterile technique and a right common femoral artery puncture was performed with a micropuncture needle. A 5F sheath was placed and connected to saline flush.  Through this sheath a 5F Edi catheter was advanced over a wire and used to perform selective angiography of the following vessels: Bilateral common carotid, bilateral internal carotid, and left vertebral artery.    FINDINGS:  The right common carotid artery was selected and an angiogram was performed. Angiogram demonstrates no stenosis at the bifurcation.    The right internal carotid artery was selected and an angiogram was performed. Angiogram demonstrates no aneurysm or occlusion.  There are multifocal stenoses involving mid to distal branches of the middle and anterior cerebral arteries.  The distal internal carotid artery shows mild irregularity without hemodynamically significant stenosis.    The left common carotid artery was selected and an angiogram was performed. Angiogram demonstrates no stenosis at the bifurcation, there is an acute bend in the proximal left internal carotid artery.  Therefore angiography of the brain was performed during injection of the common carotid artery.  This revealed multifocal areas of stenosis involving the mid to distal branches of the middle and anterior cerebral arteries, most notably the left A2 segment where there is  at least 80% focal stenosis.  This angiogram also demonstrated similar appearing areas of narrowing involving at least one external carotid artery branch.    The left vertebral artery was selected and an angiogram was performed. Angiogram demonstrates an infundibulum of the proximal basilar artery.  Multifocal stenoses are noted, particularly of the superior cerebellar arteries as well as the P1 and P2 segments of both posterior cerebral arteries.      The venous drainage pattern was within normal limits.    The catheter was removed and an angiogram was performed through the femoral sheath demonstrating an access site appropriate for closure device.    Hemostasis was obtained in the right groin using the Exoseal device.  The total contrast dose for the procedure was: 60 cc of Visipaque.  The total radiation dose was approximately: 11,182 cGycm2.  Physicians in the procedure:  Dr. Rosangela Wren, Dr. Parrish Burgos. Images and report were permanently recorded.   Impression       Multifocal areas of stenosis involving the intracranial anterior and posterior circulation most notably in the left A2 segment and the superior cerebellar arteries as detailed above. These have an appearance consistent with cerebral vasculitis.  However, multifocal atherosclerotic disease can have a very similar appearance.  Repeat angiography may be useful following a period of treatment if vasculitis is a clinical consideration.    ______________________________________     Electronically signed by resident: PARRISH BURGOS MD  Date: 06/01/17  Time: 18:01            As the supervising and teaching physician, I personally reviewed the images and resident's interpretation and I agree with the findings.          Electronically signed by: ROSANGELA WREN MD  Date: 06/02/17  Time: 09:22         MRI Brain W WO Contrast   Status: Final result   MyChart Results Release     MyChart Status: Active Results Release   Signed by     Signed Credentials  Date/Time  Phone Pager   LEN GÓMEZ DO 5/29/2017 07:58 [phone]   PACS Images     Show images for MRI Brain W WO Contrast   External Result Report     External Result Report   Narrative     Procedure: MRI the brain with andwithout contrast.    Technique: Sagittal and axial T1, axial T2, axial FLAIR, axial gradient, axial diffusion, and axial, sagittal, and coronal postcontrast T1 images of the whole brain. 10 ml of Gadavist injected intravenously.         Comparison: 05/26/2017    Findings: Study is again limited by motion specifically on the post contrast imaging.  Overall the regions of restricted diffusion right inferior frontal lobe and right posterior temporal occipital lobes with punctate foci in the left centrum semiovale, posterior medial temporal lobe, left cerebellum and left brachium pontis similar to prior.  Allowing for motion limitation there is no definite enhancement in these regions.  There is continued superimposed T2 flair signal hyperintensity throughout the supratentorial white matter which may be sequela of advanced chronic microvascular ischemic change and prior infarcts versus prior region of demyelination.    There is a small linear focus of enhancement in the left paramedian dorsal jose with corresponding flair hyperintensity and subtle diffusion hyperintensity which may be an active area of demyelination however again is limited by motion.    There is a small focus of susceptibility within the left cerebellum suggestive for remote microhemorrhage.    Small areas of cystic encephalomalacia in the basal ganglia bilaterally suggestive for remote lacunar-type infarcts versus encephalomalacia from prior demyelination.  Allowing for motion limitation compared to recent prior there is no definite significant new lesion.  Electronic notification system activated.    There is continued flair hyperintensity along the undersurface of the corpus callosum diffusely with several  lesions extend to the margin of the body of the corpus callosum.   Impression      Small to punctate-sized scattered foci of signal abnormality cerebral hemispheres, left cerebellum and brainstem similarly seen on the prior allowing for motion limitation.  This is superimposed on confluent regions of T2 flair signal abnormality supratentorial white matter and jose in light of corpus callosal involvement most suggestive for areas of recent or active demyelination superimposed on prior demyelination.  However alternative differential including areas of infarction with remote lacunar-type infarcts and age advanced chronic microvascular ischemic changes not excluded.    There is a small focus enhancement in the left dorsal jose differential to include but not limited to active demyelination versus subacute infarction.    Allowing for motion compared to prior there is no significant interval change.  No hydrocephalus or midline shift.    Clinical correlation and follow evaluation with additional imaging with alternative sedation methods to be considered in light of persistent motion limitation.      Electronically signed by: LEN GÓMEZ DO  Date: 05/29/17  Time: 07:58

## 2017-06-02 NOTE — PROGRESS NOTES
[facility]  Vascular Neurology  Comprehensive Stroke Center  Progress Note    Neurologic Chief Complaint: MS vs cardio-embolic vs watershade stroke     Subjective:     Interval History: Patient is seen for follow-up neurological assessment and treatment recommendations:     HALINA.     HPI, Past Medical, Family, and Social History remains the same as documented in the initial encounter.     Review of Systems  Scheduled Meds:   atorvastatin  40 mg Oral Daily    diltiaZEM  60 mg Oral Q8H    insulin aspart  12 Units Subcutaneous TIDWM    insulin detemir  38 Units Subcutaneous QHS    levetiracetam  500 mg Oral BID    pantoprazole  40 mg Intravenous BID    sertraline  25 mg Oral Daily    sodium chloride 0.9%  1,000 mL Intravenous Once    vitamin D  5,000 Units Oral Daily     Continuous Infusions:     PRN Meds:acetaminophen, dextrose 50%, dextrose 50%, dextrose 50%, dextrose 50%, glucagon (human recombinant), glucagon (human recombinant), glucagon (human recombinant), glucose, glucose, insulin aspart, omnipaque    Objective:     Vital Signs (Most Recent):  Temp: 98.3 °F (36.8 °C) (06/02/17 0900)  Pulse: 81 (06/02/17 1100)  Resp: 18 (06/02/17 0900)  BP: 123/66 (06/02/17 0900)  SpO2: (!) 94 % (06/02/17 0900)  BP Location: Right arm    Vital Signs Range (Last 24H):  Temp:  [97.3 °F (36.3 °C)-98.5 °F (36.9 °C)]   Pulse:  [44-82]   Resp:  [8-18]   BP: (115-162)/(58-90)   SpO2:  [94 %-100 %]   BP Location: Right arm    Physical Exam   Constitutional: He appears well-developed and well-nourished.   HENT:   Head: Normocephalic and atraumatic.   Eyes: Conjunctivae and EOM are normal. Pupils are equal, round, and reactive to light.   Neck: Normal range of motion. Neck supple.   Cardiovascular: Normal rate and regular rhythm.    Murmur (systolic) heard.  Pulmonary/Chest: Effort normal and breath sounds normal.   Abdominal: Soft. Bowel sounds are normal. He exhibits no distension and no mass. There is no  tenderness.   Musculoskeletal: He exhibits no edema or deformity.   Neurological: He is alert. No cranial nerve deficit. Coordination normal. GCS eye subscore is 4. GCS verbal subscore is 5. GCS motor subscore is 6.   Skin: Skin is warm and dry. No rash noted. No erythema.   Psychiatric: He has a normal mood and affect. His behavior is normal.   Nursing note and vitals reviewed.      [place] Coma Scale:  Best Eye Response: 4  Best Motor Response: 6  Best Verbal Response: 5        Laboratory:  CMP:     Recent Labs  Lab 06/02/17  0511   CALCIUM 7.9*   ALBUMIN 2.4*   PROT 5.7*      K 3.8   CO2 21*   *   BUN 10   CREATININE 1.0   ALKPHOS 82   ALT 21   AST 19   BILITOT 0.5     CBC:     Recent Labs  Lab 06/02/17  0511   WBC 5.51  5.51  5.51   RBC 2.97*  2.97*  2.97*   HGB 9.1*  9.1*  9.1*   HCT 26.1*  26.1*  26.1*     164  164   MCV 88  88  88   MCH 30.6  30.6  30.6   MCHC 34.9  34.9  34.9     Lipid Panel: No results for input(s): CHOL, LDLCALC, HDL, TRIG in the last 168 hours.  Hgb A1C: No results for input(s): HGBA1C in the last 168 hours.  TSH:   No results for input(s): TSH in the last 168 hours.      Assessment/Plan:     56 y/o male with possible MS treated with steroids on last admission. Patient has been having intermittent aphasia, inattention, and delayed response. Repeat MRI revealed new lesions with one lesion in R frontal lobe concerning for stroke but very degrade by motion. Patient with no real focal neuro deficits, moving all extremities, flat affect, follows commands but has inattention and delayed response. Symptoms are waxing and waning.     5/29: Repeated MRI is limited 2/2 motion artifact, revealing multiple scattered foci suggesting demyelination vs stroke. Given recent MRI there suspicion of cardio-embolic phenomenon. Plan for GIOVANNI after EGD given h/o melena and drop in H/H.        5/30: EGD done which revealed duodenal and gastric ulcer treated with cautery. Bedside  Echo WNL. Bcx NGTD. Cardiology postponed GIOVANNI given multiple ulcers seen during EGD.  Awaiting spine MRI     6/2: Cardiology deferred GIOVANNI to be done in the out patient settings since endocarditis is low in DDX. Neurology are suspecting vasculitis, w/u is underway. Cerebral angio revealing multifocal stenosis mainly in the anterior circulation that is consisting with vasculitis or multifocal atherosclerosis. 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 Pt is off steroid. 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        Upper GI bleed    -- Per acute blood loss         Acute blood loss anemia    -- Risk factor for watershade infarct   -- Multiple gastric and duodenal ulcers treated via EGD   -- On PPI        Type 2 diabetes mellitus with hyperglycemia, with long-term current use of insulin    Stroke risk factor  A1C 11.4 on 5/13/17  Continue aspart and detemir   on medication compliance prior to discharge        Essential hypertension    Stroke risk factor          Mixed hyperlipidemia    Stroke risk factor  Continue atorvastatin 40        * Encephalopathy    Patient with intermittent aphasia, inattention, and delayed response    Area in R frontal lobe concerning for possible new infarct. Patient has hyperdensity in DWI and correlating hypodensity in ADC. However this new lesion may reflect a new demyelinating lesion. Depending on stage of a demyelinating lesion, it could appear as an acute stroke on MRI. At this point, it is difficult to ascertain the cause of the R frontal lesion. Therefore, would treat as an acute stroke until proven otherwise.    Repeated MRI is limited 2/2 motion artifact, revealing multiple scattered foci suggesting demyelination vs stroke.    Neurology suspecting vasculitis, w/u underway. Cerebral angio showing multifocal stenosis consisting with vasculitis. Cardiology are not  suspecting endocarditis and GIOVANNI recommended to be done in the out patient settings.             Salima Brown MD  Comprehensive Stroke Center  Department of Vascular Neurology   [facility]

## 2017-06-02 NOTE — PROGRESS NOTES
Called her wife and she said she will be in the patient room in about 20 minutes. Will round with Dr Love and discuss the imaging finding with the family.       Sharif Landaverde MD  Neurology Resident   [facility]  [address] [place] [address]  Pager: [phone]

## 2017-06-02 NOTE — NURSING
"Spoke to Dr. Mendoza re: pt's insulin orders, recent CBGs, and CBG frequency. MD states he will update orders. Also made him aware pt has not had ordered MRIs, per NOC RN, she was told by MRI that pt was not "sedated enough." No further instructions or orders received re: MRI. Will continue to monitor.  "

## 2017-06-02 NOTE — NURSING
Spoke to Dr. Mendoza re: DC tele order, Dr. Mendoza states to maintain tele, states will enter order.

## 2017-06-02 NOTE — PLAN OF CARE
Problem: Patient Care Overview  Goal: Plan of Care Review  Outcome: Ongoing (interventions implemented as appropriate)  Pt aaox4, VSS. No acute changes over night. wife remain at bedside.pt up with 1 assist, instructed to call when needing assistance OOB. BSC is on side of pts bed. Condom cath in place. Blood sugar checks maintained, coverage admin.neuro checks maintained.  Pt compliant with instructions of laying flat bc of cerebral angiogram that was done earlier yesterday. Pt right groin site dry, no bleeding and is intact.  no injuries/falls. WCTM

## 2017-06-03 LAB
ACHR BIND AB SER-SCNC: 0 NMOL/L
ALBUMIN SERPL BCP-MCNC: 2.4 G/DL
ALP SERPL-CCNC: 78 U/L
ALT SERPL W/O P-5'-P-CCNC: 18 U/L
AMPHIPHYSIN AB TITR SER: NEGATIVE TITER
ANION GAP SERPL CALC-SCNC: 10 MMOL/L
AST SERPL-CCNC: 16 U/L
BASOPHILS # BLD AUTO: 0.02 K/UL
BASOPHILS NFR BLD: 0.4 %
BILIRUB SERPL-MCNC: 0.4 MG/DL
BUN SERPL-MCNC: 9 MG/DL
CALCIUM SERPL-MCNC: 7.8 MG/DL
CHLORIDE SERPL-SCNC: 111 MMOL/L
CO2 SERPL-SCNC: 20 MMOL/L
CREAT SERPL-MCNC: 1 MG/DL
CV2 IGG TITR SER: NEGATIVE TITER
DIFFERENTIAL METHOD: ABNORMAL
EOSINOPHIL # BLD AUTO: 0.1 K/UL
EOSINOPHIL NFR BLD: 2.4 %
ERYTHROCYTE [DISTWIDTH] IN BLOOD BY AUTOMATED COUNT: 15 %
EST. GFR  (AFRICAN AMERICAN): >60 ML/MIN/1.73 M^2
EST. GFR  (NON AFRICAN AMERICAN): >60 ML/MIN/1.73 M^2
GLIAL NUC TYPE 1 AB TITR SER: NEGATIVE TITER
GLUCOSE SERPL-MCNC: 299 MG/DL
HCT VFR BLD AUTO: 25.2 %
HGB BLD-MCNC: 8.5 G/DL
HU1 AB TITR SER: NEGATIVE TITER
HU2 AB TITR SER IF: NEGATIVE TITER
HU3 AB TITR SER: NEGATIVE TITER
IMMUNOLOGIST REVIEW: NORMAL
LYMPHOCYTES # BLD AUTO: 1.7 K/UL
LYMPHOCYTES NFR BLD: 32.3 %
MAGNESIUM SERPL-MCNC: 1.4 MG/DL
MCH RBC QN AUTO: 29.2 PG
MCHC RBC AUTO-ENTMCNC: 33.7 %
MCV RBC AUTO: 87 FL
MONOCYTES # BLD AUTO: 0.5 K/UL
MONOCYTES NFR BLD: 8.7 %
NACHR AB SER-SCNC: 0 NMOL/L
NEUTROPHILS # BLD AUTO: 3 K/UL
NEUTROPHILS NFR BLD: 55.8 %
PAVAL REFLEX TEST ADDED: NORMAL
PCA-1 AB TITR SER: NEGATIVE TITER
PCA-2 AB TITR SER: NEGATIVE TITER
PCA-TR AB TITR SER: NEGATIVE TITER
PHOSPHATE SERPL-MCNC: 2.9 MG/DL
PLATELET # BLD AUTO: 164 K/UL
PMV BLD AUTO: 8.4 FL
POCT GLUCOSE: 151 MG/DL (ref 70–110)
POCT GLUCOSE: 195 MG/DL (ref 70–110)
POCT GLUCOSE: 213 MG/DL (ref 70–110)
POTASSIUM SERPL-SCNC: 4 MMOL/L
PROT SERPL-MCNC: 5.2 G/DL
RBC # BLD AUTO: 2.91 M/UL
SODIUM SERPL-SCNC: 141 MMOL/L
STRIA MUS AB TITR SER: NEGATIVE TITER
VGCC-N BIND AB SER-SCNC: 0 NMOL/L
VGCC-P/Q BIND AB SER-SCNC: 0 NMOL/L
VGKC AB SER-SCNC: 0 NMOL/L
WBC # BLD AUTO: 5.39 K/UL

## 2017-06-03 PROCEDURE — 25000003 PHARM REV CODE 250: Performed by: HOSPITALIST

## 2017-06-03 PROCEDURE — 25000003 PHARM REV CODE 250: Performed by: PSYCHIATRY & NEUROLOGY

## 2017-06-03 PROCEDURE — 63600175 PHARM REV CODE 636 W HCPCS: Performed by: PSYCHIATRY & NEUROLOGY

## 2017-06-03 PROCEDURE — 99233 SBSQ HOSP IP/OBS HIGH 50: CPT | Mod: ,,, | Performed by: PSYCHIATRY & NEUROLOGY

## 2017-06-03 PROCEDURE — 84100 ASSAY OF PHOSPHORUS: CPT

## 2017-06-03 PROCEDURE — 25000003 PHARM REV CODE 250: Performed by: INTERNAL MEDICINE

## 2017-06-03 PROCEDURE — 20600001 HC STEP DOWN PRIVATE ROOM

## 2017-06-03 PROCEDURE — 25000003 PHARM REV CODE 250: Performed by: STUDENT IN AN ORGANIZED HEALTH CARE EDUCATION/TRAINING PROGRAM

## 2017-06-03 PROCEDURE — 85025 COMPLETE CBC W/AUTO DIFF WBC: CPT

## 2017-06-03 PROCEDURE — 25000003 PHARM REV CODE 250: Performed by: NURSE PRACTITIONER

## 2017-06-03 PROCEDURE — 83735 ASSAY OF MAGNESIUM: CPT

## 2017-06-03 PROCEDURE — 36415 COLL VENOUS BLD VENIPUNCTURE: CPT

## 2017-06-03 PROCEDURE — 99233 SBSQ HOSP IP/OBS HIGH 50: CPT | Mod: ,,, | Performed by: INTERNAL MEDICINE

## 2017-06-03 PROCEDURE — 80053 COMPREHEN METABOLIC PANEL: CPT

## 2017-06-03 PROCEDURE — 63600175 PHARM REV CODE 636 W HCPCS: Performed by: INTERNAL MEDICINE

## 2017-06-03 RX ORDER — INSULIN ASPART 100 [IU]/ML
15 INJECTION, SOLUTION INTRAVENOUS; SUBCUTANEOUS
Status: DISCONTINUED | OUTPATIENT
Start: 2017-06-03 | End: 2017-06-04 | Stop reason: HOSPADM

## 2017-06-03 RX ORDER — ONDANSETRON 2 MG/ML
8 INJECTION INTRAMUSCULAR; INTRAVENOUS ONCE
Status: COMPLETED | OUTPATIENT
Start: 2017-06-03 | End: 2017-06-03

## 2017-06-03 RX ORDER — MAGNESIUM SULFATE HEPTAHYDRATE 40 MG/ML
2 INJECTION, SOLUTION INTRAVENOUS ONCE
Status: COMPLETED | OUTPATIENT
Start: 2017-06-03 | End: 2017-06-03

## 2017-06-03 RX ADMIN — LEVETIRACETAM 500 MG: 500 TABLET, FILM COATED ORAL at 08:06

## 2017-06-03 RX ADMIN — INSULIN ASPART 12 UNITS: 100 INJECTION, SOLUTION INTRAVENOUS; SUBCUTANEOUS at 09:06

## 2017-06-03 RX ADMIN — INSULIN ASPART 2 UNITS: 100 INJECTION, SOLUTION INTRAVENOUS; SUBCUTANEOUS at 09:06

## 2017-06-03 RX ADMIN — DEXTROSE AND SODIUM CHLORIDE: 5; .45 INJECTION, SOLUTION INTRAVENOUS at 01:06

## 2017-06-03 RX ADMIN — LEVETIRACETAM 500 MG: 500 TABLET, FILM COATED ORAL at 09:06

## 2017-06-03 RX ADMIN — ATORVASTATIN CALCIUM 40 MG: 20 TABLET, FILM COATED ORAL at 09:06

## 2017-06-03 RX ADMIN — INSULIN ASPART 1 UNITS: 100 INJECTION, SOLUTION INTRAVENOUS; SUBCUTANEOUS at 08:06

## 2017-06-03 RX ADMIN — INSULIN ASPART 15 UNITS: 100 INJECTION, SOLUTION INTRAVENOUS; SUBCUTANEOUS at 12:06

## 2017-06-03 RX ADMIN — PANTOPRAZOLE SODIUM 40 MG: 40 TABLET, DELAYED RELEASE ORAL at 08:06

## 2017-06-03 RX ADMIN — DILTIAZEM HYDROCHLORIDE 60 MG: 60 TABLET, FILM COATED ORAL at 01:06

## 2017-06-03 RX ADMIN — INSULIN ASPART 15 UNITS: 100 INJECTION, SOLUTION INTRAVENOUS; SUBCUTANEOUS at 04:06

## 2017-06-03 RX ADMIN — MAGNESIUM SULFATE HEPTAHYDRATE 2 G: 40 INJECTION, SOLUTION INTRAVENOUS at 09:06

## 2017-06-03 RX ADMIN — PANTOPRAZOLE SODIUM 40 MG: 40 TABLET, DELAYED RELEASE ORAL at 09:06

## 2017-06-03 RX ADMIN — DILTIAZEM HYDROCHLORIDE 60 MG: 60 TABLET, FILM COATED ORAL at 05:06

## 2017-06-03 RX ADMIN — VITAMIN D, TAB 1000IU (100/BT) 5000 UNITS: 25 TAB at 09:06

## 2017-06-03 RX ADMIN — SERTRALINE HYDROCHLORIDE 150 MG: 50 TABLET ORAL at 10:06

## 2017-06-03 RX ADMIN — ONDANSETRON 8 MG: 2 INJECTION INTRAMUSCULAR; INTRAVENOUS at 02:06

## 2017-06-03 RX ADMIN — DILTIAZEM HYDROCHLORIDE 60 MG: 60 TABLET, FILM COATED ORAL at 08:06

## 2017-06-03 NOTE — PROGRESS NOTES
[facility]  Vascular Neurology  Comprehensive Stroke Center  Progress Note    Neurologic Chief Complaint: MS vs cardio-embolic vs watershade stroke     Subjective:     Interval History: Patient is seen for follow-up neurological assessment and treatment recommendations:     Patient was given a dose of cytoxan for CNS vasculitis. Will need a dose once per month for three months. MRA brain consistent with CNS vasculitis. Still has urinary and fecal incontinence- neuro has no plans for this and believes this is secondary to infarct.     HPI, Past Medical, Family, and Social History remains the same as documented in the initial encounter.     Review of Systems   Constitutional: Negative for fever.   HENT: Negative for congestion.    Respiratory: Negative for shortness of breath.    Gastrointestinal: Negative for abdominal pain.   Genitourinary: Negative for dysuria.   Psychiatric/Behavioral: Positive for confusion.     Scheduled Meds:   atorvastatin  40 mg Oral Daily    diltiaZEM  60 mg Oral Q8H    insulin aspart  15 Units Subcutaneous TIDWM    insulin detemir  40 Units Subcutaneous QHS    levetiracetam  500 mg Oral BID    methylPREDNISolone (SOLU-Medrol) IVPB (doses > 250 mg)   Intravenous Once    pantoprazole  40 mg Oral BID    sertraline  150 mg Oral Daily    sodium chloride 0.9%  1,000 mL Intravenous Once    vitamin D  5,000 Units Oral Daily     Continuous Infusions:     PRN Meds:acetaminophen, dextrose 50%, dextrose 50%, dextrose 50%, dextrose 50%, glucagon (human recombinant), glucagon (human recombinant), glucagon (human recombinant), glucose, glucose, insulin aspart, omnipaque    Objective:     Vital Signs (Most Recent):  Temp: 98.3 °F (36.8 °C) (06/03/17 0748)  Pulse: 91 (06/03/17 1100)  Resp: 18 (06/03/17 0748)  BP: (!) 144/71 (06/03/17 0748)  SpO2: 95 % (06/03/17 0748)  BP Location: Right arm    Vital Signs Range (Last 24H):  Temp:  [97.1 °F (36.2 °C)-99.5 °F (37.5 °C)]   Pulse:   []   Resp:  [18-20]   BP: (128-163)/(70-83)   SpO2:  [95 %-99 %]   BP Location: Right arm    Physical Exam   Constitutional: He appears well-developed and well-nourished.   HENT:   Head: Normocephalic and atraumatic.   Eyes: Conjunctivae and EOM are normal. Pupils are equal, round, and reactive to light.   Neck: Normal range of motion. Neck supple.   Cardiovascular: Normal rate and regular rhythm.    Murmur (systolic) heard.  Pulmonary/Chest: Effort normal and breath sounds normal.   Abdominal: Soft. Bowel sounds are normal. He exhibits no distension and no mass. There is no tenderness.   Musculoskeletal: He exhibits no edema or deformity.   Neurological: He is alert. No cranial nerve deficit. Coordination normal. GCS eye subscore is 4. GCS verbal subscore is 5. GCS motor subscore is 6.   Skin: Skin is warm and dry. No rash noted. No erythema.   Psychiatric: He has a normal mood and affect. His behavior is normal.   Nursing note and vitals reviewed.      Jarod Coma Scale:  Best Eye Response: 4  Best Motor Response: 6  Best Verbal Response: 5        Laboratory:  CMP:     Recent Labs  Lab 06/03/17  0501   CALCIUM 7.8*   ALBUMIN 2.4*   PROT 5.2*      K 4.0   CO2 20*   *   BUN 9   CREATININE 1.0   ALKPHOS 78   ALT 18   AST 16   BILITOT 0.4     CBC:     Recent Labs  Lab 06/03/17  0501   WBC 5.39  5.39  5.39   RBC 2.91*  2.91*  2.91*   HGB 8.5*  8.5*  8.5*   HCT 25.2*  25.2*  25.2*     164  164   MCV 87  87  87   MCH 29.2  29.2  29.2   MCHC 33.7  33.7  33.7     Lipid Panel: No results for input(s): CHOL, LDLCALC, HDL, TRIG in the last 168 hours.  Hgb A1C: No results for input(s): HGBA1C in the last 168 hours.  TSH:   No results for input(s): TSH in the last 168 hours.      Assessment/Plan:     58 y/o male with possible MS treated with steroids on last admission. Patient has been having intermittent aphasia, inattention, and delayed response. Repeat MRI revealed new lesions with  one lesion in R frontal lobe concerning for stroke but very degrade by motion. Patient with no real focal neuro deficits, moving all extremities, flat affect, follows commands but has inattention and delayed response. Symptoms are waxing and waning.     5/29: Repeated MRI is limited 2/2 motion artifact, revealing multiple scattered foci suggesting demyelination vs stroke. Given recent MRI there suspicion of cardio-embolic phenomenon. Plan for GIOVANNI after EGD given h/o melena and drop in H/H.        5/30: EGD done which revealed duodenal and gastric ulcer treated with cautery. Bedside Echo WNL. Bcx NGTD. Cardiology postponed GIOVANNI given multiple ulcers seen during EGD.  Awaiting spine MRI     6/2: Cardiology deferred GIOVANNI to be done in the out patient settings since endocarditis is low in DDX. Neurology are suspecting vasculitis, w/u is underway. Cerebral angio revealing multifocal stenosis mainly in the anterior circulation that is consisting with vasculitis or multifocal atherosclerosis. 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 Pt is off steroid       6/3: Patient was given a dose of cytoxan for CNS vasculitis. Will need a dose once per month for three months. MRA brain consistent with CNS vasculitis. Still has urinary and fecal incontinence- neuro has no plans for this and believes this is secondary to infarct.     Upper GI bleed    -- Per acute blood loss         Acute blood loss anemia    -- Risk factor for watershade infarct   -- Multiple gastric and duodenal ulcers treated via EGD   -- On PPI        Encephalopathy    Patient with intermittent aphasia, inattention, and delayed response    Area in R frontal lobe concerning for possible new infarct. Patient has hyperdensity in DWI and correlating hypodensity in ADC. However this new lesion may reflect a new demyelinating lesion. Depending on stage of a demyelinating lesion, it could appear as an  acute stroke on MRI. At this point, it is difficult to ascertain the cause of the R frontal lesion. Therefore, would treat as an acute stroke until proven otherwise.    Repeated MRI is limited 2/2 motion artifact, revealing multiple scattered foci suggesting demyelination vs stroke.    Neurology suspecting vasculitis, w/u underway. Cerebral angio showing multifocal stenosis consisting with vasculitis. Cardiology are not suspecting endocarditis and GIOVANNI recommended to be done in the out patient settings.     Patient given cytoxan on night of 6/2. No complaints from this. Will need one dose monthly for three months as per neuro. Plan to keep one more night in observation.         Internuclear ophthalmoplegia of left eye    - seen by ophthal; needs outpatient follow up        Type 2 diabetes mellitus with hyperglycemia, with long-term current use of insulin    Stroke risk factor  A1C 11.4 on 5/13/17  Continue aspart and detemir   on medication compliance prior to discharge        Essential hypertension    Stroke risk factor          Mixed hyperlipidemia    Stroke risk factor  Continue atorvastatin 40        Depressive disorder, not elsewhere classified    - restarted on home zoloft 150 mg             Dispo: Home health.     Jess Sprague MD  Comprehensive Stroke Center  Department of Vascular Neurology   [facility]

## 2017-06-03 NOTE — PLAN OF CARE
Problem: Infection, Risk/Actual (Adult)  Goal: Infection Prevention/Resolution  Patient will demonstrate the desired outcomes by discharge/transition of care.   Outcome: Ongoing (interventions implemented as appropriate)  No respiratory distress noted at this time.  Patient c/o nausea and vomiting during Chemo infusion.  Educated patient and wife on side effects of chemo infusion.  Verbalized understanding at this time.  Incision to left side of head clean dry and intact, no drainage noted at this time.  Safety maintained.  Will monitor

## 2017-06-03 NOTE — ASSESSMENT & PLAN NOTE
56 yo M with PMH poorly controlled DM2, HLD, HTN, CVA, CHASE, internuclear ophthalmoplegia of the L eye seen in consult for possible vasculitis with cerebral angiogram showing multifocal areas of stenosis. Symptoms have included blurry vision and encephalopathy. MRI suggestive of demyelinating disease. Thorough autoimmune workup this far has been negative including REYMUNDO x 2, ANCA x 2, ACE, acl ab, C3, C4, SPEP, NMO also no clinical sign of systemic vasculitis. Infectious workup negative as well. PACNS is extremely rare and against it is elevated glucose (normal in PACNS) and lack of HA throughout which is most common feature of PACNS. He also developed worsening symptoms requiring readmission after 5 days of pulse steroids. Given history of poorly controlled DM, HTN, HLD, obesity atherosclerosis is still possible, however black blood MRI is read as more suggestive of vasculitis. Embolism also remains a possibility as pt was unable to have GIOVANNI. He was given CYC last night by neurology for suspected CNS vasculitis.     -A/w B2-glycoprotein, cryos   -Recommend CBC in 7 and 14 days for monitoring after CYC   -Consider prednisone 60mg daily but will defer to neurology, also zantac daily to protect stomach on high dose steroids   -Recommend 1000mg Ca++ and 1000mg vit D daily, and check DXA as o/p as steroids thin the bones   -Recommend o/p ID fastrack referral for immunizations and will likely need PCP prophylaxis while immunosuppressed

## 2017-06-03 NOTE — PROGRESS NOTES
Chemo note:  Cytoxan started through right forearm 20 g Iv. Iv noted to have positive blood return and flushed without difficulty.  Chemo to run over 3 hours.  Dosage and BSA checked by two chemotherapy certified nurses and premedications given prior to starting infusion.  Chemotherapy education completed at this time.  Chemotherapeutic precautions in place throughout therapy.  Will continue to monitor.

## 2017-06-03 NOTE — SUBJECTIVE & OBJECTIVE
Interval History: Pt seen and examined. Still a little confused. Knows we are at [facility], but thinks we are in [facility]. Black blood MRI yesterday more suggestive of vasculitis than atherosclerosis per the read so pt was given CYC by neurology last night.    Current Facility-Administered Medications   Medication Frequency    acetaminophen tablet 650 mg Q6H PRN    atorvastatin tablet 40 mg Daily    dextrose 50% injection 12.5 g PRN    dextrose 50% injection 12.5 g PRN    dextrose 50% injection 12.5 g PRN    dextrose 50% injection 25 g PRN    diltiaZEM tablet 60 mg Q8H    glucagon (human recombinant) injection 1 mg PRN    glucagon (human recombinant) injection 1 mg PRN    glucagon (human recombinant) injection 1 mg PRN    glucose chewable tablet 16 g PRN    glucose chewable tablet 24 g PRN    insulin aspart pen 0-5 Units Q6H PRN    insulin aspart pen 15 Units TIDWM    insulin detemir pen 40 Units QHS    levetiracetam tablet 500 mg BID    magnesium sulfate 2g in water 50mL IVPB (premix) Once    methylPREDNISolone sodium succinate (SOLU-MEDROL) 1,000 mg in dextrose 5 % 100 mL IVPB Once    omnipaque oral solution 15 mL PRN    pantoprazole EC tablet 40 mg BID    sertraline tablet 150 mg Daily    sodium chloride 0.9% bolus 1,000 mL Once    vitamin D 1000 units tablet 5,000 Units Daily     Objective:     Vital Signs (Most Recent):  Temp: 98.3 °F (36.8 °C) (06/03/17 0748)  Pulse: 90 (06/03/17 0748)  Resp: 18 (06/03/17 0748)  BP: (!) 144/71 (06/03/17 0748)  SpO2: 95 % (06/03/17 0748)  O2 Device (Oxygen Therapy): room air (06/03/17 0748) Vital Signs (24h Range):  Temp:  [97.1 °F (36.2 °C)-99.5 °F (37.5 °C)] 98.3 °F (36.8 °C)  Pulse:  [] 90  Resp:  [18-20] 18  SpO2:  [95 %-99 %] 95 %  BP: (128-163)/(70-83) 144/71     Weight: 95.5 kg (210 lb 8.6 oz) (06/02/17 1000)  Body mass index is 29.36 kg/m².  Body surface area is 2.19 meters squared.      Intake/Output Summary (Last 24 hours) at 06/03/17  0937  Last data filed at 06/03/17 0600   Gross per 24 hour   Intake             2850 ml   Output              325 ml   Net             2525 ml       Physical Exam   Constitutional: He is well-developed, well-nourished, and in no distress. No distress.   HENT:   Head: Normocephalic and atraumatic.   Mouth/Throat: No oropharyngeal exudate.   Eyes: Conjunctivae are normal. No scleral icterus.   Neck: Normal range of motion. Neck supple. No thyromegaly present.   Cardiovascular: Normal rate, regular rhythm and normal heart sounds.    Pulmonary/Chest: Effort normal and breath sounds normal.   Abdominal: Soft. There is no tenderness.       Right Side Rheumatological Exam     Examination finds the shoulder, elbow, wrist, knee, 1st PIP, 1st MCP, 2nd PIP, 2nd MCP, 3rd PIP, 3rd MCP, 4th PIP, 4th MCP, 5th PIP and 5th MCP normal.    Left Side Rheumatological Exam     Examination finds the shoulder, elbow, wrist, knee, 1st PIP, 1st MCP, 2nd PIP, 2nd MCP, 3rd PIP, 3rd MCP, 4th PIP, 4th MCP, 5th PIP and 5th MCP normal.      Lymphadenopathy:     He has no cervical adenopathy.   Neurological: He is alert. No cranial nerve deficit. GCS score is 15.   Skin: Skin is warm and dry. No rash noted.     Musculoskeletal: Normal range of motion. He exhibits no edema or tenderness.         Significant Labs:  CBC:   Recent Labs  Lab 06/03/17  0501   WBC 5.39  5.39  5.39   HGB 8.5*  8.5*  8.5*   HCT 25.2*  25.2*  25.2*     164  164     CMP:   Recent Labs  Lab 06/03/17  0501   *   CALCIUM 7.8*   ALBUMIN 2.4*   PROT 5.2*      K 4.0   CO2 20*   *   BUN 9   CREATININE 1.0   ALKPHOS 78   ALT 18   AST 16   BILITOT 0.4     All pertinent lab results from the last 24 hours have been reviewed.    Significant Imaging:  Imaging results within the past 24 hours have been reviewed.     MRA Brain with and without contrast   Status: Final result   MyChart Results Release     MyChart Status: Active Results Release   Signed by      Signed Credentials Date/Time  Phone Pager   DALIA HUNG MD 6/02/2017 17:07 [phone]    PACS Images     Show images for MRA Brain with and without contrast   External Result Report     External Result Report   Narrative     06/02/17 15:51:21    Accession# [id number]    CLINICAL INDICATION: 57 year old M with multiple recent strokes, concern of vasculitis on Angio.      TECHNIQUE: 3-D time-of-flight noncontrast MR angiogram of the intracranial vasculature with multiple MIP reconstructions. Examination performed in conjunction with vessel wall imaging using black blood MRA technique. Double IR T1-weighted sequence performed in the axial and coronal planes before and after the administration 8 ml Gadavist intravenous contrast.    COMPARISON:   CTA 03/22/2017, MRI/MRA 05/13/2017, MRI 05/29/2017, and DSA 06/01/2017    FINDINGS:    MR angiogram again demonstrates multifocal areas of irregularity and narrowing in the intracranial vasculature. Areas of involvement include the proximal left PCA (P2 segment), proximal right superior cerebellar artery, as well as the distal A2 segment of the left anterior cerebral artery. Areas of stenosis appear fixed, grossly stable from the prior examination, allowing for variation in modality and technique. This would essentially exclude reversible cerebral vasoconstriction syndrome (Call Boyce) from the differential diagnosis. There is no intrinsic T1 hyperintensity within the wall and multifocal nature would essentially exclude arterial dissection.    The vessel wall imaging portion of the examination was focused on the distal left ROMAIN stenosis. There is focal luminal narrowing, which extends over approximately a 7 mm segment. There is an eccentric wall thickening at this site (sequence 3 image 4) which is marked with arrows. These findings can be seen with atherosclerotic disease or vasculitis. Post contrast images demonstrate some subtle enhancement in this region  (sequence 6 image 4, sequence 5 image 4. Additional small focus of enhancement corresponding to an a small right ECA branch (additional site of narrowing identified on recent DSA) on sequence 5 image 6, which would favor vasculitis.      Enhancing focus in the right frontal lobe on postcontrast images, consistent with a subacute infarct.   Impression         Multifocal areas of intracranial arterial vascular narrowing as described above. Subtle wall thickening with mild postcontrast enhancement favor vasculitis over atherosclerosis. RCVS and dissection essentially excluded. Clinical correlation, with laboratory and CSF analysis suggested.      Electronically signed by: DALIA HUNG MD  Date: 06/02/17  Time: 17:07

## 2017-06-03 NOTE — PROGRESS NOTES
"[facility]  Rheumatology  Progress Note    Patient Name: Bessy Nunez  MRN: [id number]  Admission Date: 5/23/2017  Hospital Length of Stay: 11 days  Code Status: Full Code   Attending Provider: Derrick Agudelo MD  Primary Care Physician: Edgar Nova MD  Principal Problem: CNS vasculitis    Subjective:     HPI: 58 yo M with PMH poorly controlled DM2, HLD, HTN, CVA, CHASE, internuclear ophthalmoplegia of the L eye seen in consult for possible vasculitis with cerebral angiogram showing multifocal areas of stenosis.     He was initially admitted 5/13-5/18 with JAYDEN and diagnosed with MS. MRA brain/neck, MRI brain w/wo contrast showed no vascular occlusion, multiple foci of hyperintensity in deep cerebral periventricular white matter in pattern of demyelinating process. He was given 5 days of 1g IV solumedrol. His symptoms included double vision. After solumedrol he had improvement in symptoms and DC'd 5/18.    On 5/24 he was readmitted with worsening double vision, dizziness, syncope and confusion. On 5/28 he had waxing and waning mental status, MRI suggestive of active demyelination vs ischemia if multiple areas. Since then continues to have intermittent aphasia, inattention and delayed response.    He currently says he feels well and wants to go home. His only complaint is mild blurry vision. He denies ever having HA, fever, nosebleeds, oral/nasal ulcers, LNs, rash, tingling, numbness, weakness, CP/SOB, cough, joint pain or swelling, dysuria, blood in stool or urine.    Mentions that he had a "stroke" 16 years ago and that symptoms were just memory loss.    ESR 5/25 was 15, CRP 5, acl abs-, CK 18, ACE 23, phosphatidyl serine -, TSH nl, REYMUNDO - x 2, ANCA - x 2, C3, C4 nl, NMO -, SPEP no monoclonal bands, lyme, HIV, HBV/HCV, [place], RPR negative, UA nl, CSF showed RBC 9, WBC 4, glu 280, pro 93, nl ACE, cytology negative.    He also has a duodenal ulcer treated with cautery on " 5/30.          Interval History: Pt seen and examined. Still a little confused. Knows we are at [facility], but thinks we are in [facility]. Black blood MRI yesterday more suggestive of vasculitis than atherosclerosis per the read so pt was given CYC by neurology last night.    Current Facility-Administered Medications   Medication Frequency    acetaminophen tablet 650 mg Q6H PRN    atorvastatin tablet 40 mg Daily    dextrose 50% injection 12.5 g PRN    dextrose 50% injection 12.5 g PRN    dextrose 50% injection 12.5 g PRN    dextrose 50% injection 25 g PRN    diltiaZEM tablet 60 mg Q8H    glucagon (human recombinant) injection 1 mg PRN    glucagon (human recombinant) injection 1 mg PRN    glucagon (human recombinant) injection 1 mg PRN    glucose chewable tablet 16 g PRN    glucose chewable tablet 24 g PRN    insulin aspart pen 0-5 Units Q6H PRN    insulin aspart pen 15 Units TIDWM    insulin detemir pen 40 Units QHS    levetiracetam tablet 500 mg BID    magnesium sulfate 2g in water 50mL IVPB (premix) Once    methylPREDNISolone sodium succinate (SOLU-MEDROL) 1,000 mg in dextrose 5 % 100 mL IVPB Once    omnipaque oral solution 15 mL PRN    pantoprazole EC tablet 40 mg BID    sertraline tablet 150 mg Daily    sodium chloride 0.9% bolus 1,000 mL Once    vitamin D 1000 units tablet 5,000 Units Daily     Objective:     Vital Signs (Most Recent):  Temp: 98.3 °F (36.8 °C) (06/03/17 0748)  Pulse: 90 (06/03/17 0748)  Resp: 18 (06/03/17 0748)  BP: (!) 144/71 (06/03/17 0748)  SpO2: 95 % (06/03/17 0748)  O2 Device (Oxygen Therapy): room air (06/03/17 0748) Vital Signs (24h Range):  Temp:  [97.1 °F (36.2 °C)-99.5 °F (37.5 °C)] 98.3 °F (36.8 °C)  Pulse:  [] 90  Resp:  [18-20] 18  SpO2:  [95 %-99 %] 95 %  BP: (128-163)/(70-83) 144/71     Weight: 95.5 kg (210 lb 8.6 oz) (06/02/17 1000)  Body mass index is 29.36 kg/m².  Body surface area is 2.19 meters squared.      Intake/Output Summary (Last 24 hours)  at 06/03/17 0937  Last data filed at 06/03/17 0600   Gross per 24 hour   Intake             2850 ml   Output              325 ml   Net             2525 ml       Physical Exam   Constitutional: He is well-developed, well-nourished, and in no distress. No distress.   HENT:   Head: Normocephalic and atraumatic.   Mouth/Throat: No oropharyngeal exudate.   Eyes: Conjunctivae are normal. No scleral icterus.   Neck: Normal range of motion. Neck supple. No thyromegaly present.   Cardiovascular: Normal rate, regular rhythm and normal heart sounds.    Pulmonary/Chest: Effort normal and breath sounds normal.   Abdominal: Soft. There is no tenderness.       Right Side Rheumatological Exam     Examination finds the shoulder, elbow, wrist, knee, 1st PIP, 1st MCP, 2nd PIP, 2nd MCP, 3rd PIP, 3rd MCP, 4th PIP, 4th MCP, 5th PIP and 5th MCP normal.    Left Side Rheumatological Exam     Examination finds the shoulder, elbow, wrist, knee, 1st PIP, 1st MCP, 2nd PIP, 2nd MCP, 3rd PIP, 3rd MCP, 4th PIP, 4th MCP, 5th PIP and 5th MCP normal.      Lymphadenopathy:     He has no cervical adenopathy.   Neurological: He is alert. No cranial nerve deficit. GCS score is 15.   Skin: Skin is warm and dry. No rash noted.     Musculoskeletal: Normal range of motion. He exhibits no edema or tenderness.         Significant Labs:  CBC:   Recent Labs  Lab 06/03/17  0501   WBC 5.39  5.39  5.39   HGB 8.5*  8.5*  8.5*   HCT 25.2*  25.2*  25.2*     164  164     CMP:   Recent Labs  Lab 06/03/17  0501   *   CALCIUM 7.8*   ALBUMIN 2.4*   PROT 5.2*      K 4.0   CO2 20*   *   BUN 9   CREATININE 1.0   ALKPHOS 78   ALT 18   AST 16   BILITOT 0.4     All pertinent lab results from the last 24 hours have been reviewed.    Significant Imaging:  Imaging results within the past 24 hours have been reviewed.     MRA Brain with and without contrast   Status: Final result   MyChart Results Release     MyChart Status: Active Results Release    Signed by     Signed Credentials Date/Time  Phone Pager   DALIA HUNG MD 6/02/2017 17:07 [phone]    PACS Images     Show images for MRA Brain with and without contrast   External Result Report     External Result Report   Narrative     06/02/17 15:51:21    Accession# [id number]    CLINICAL INDICATION: 57 year old M with multiple recent strokes, concern of vasculitis on Angio.      TECHNIQUE: 3-D time-of-flight noncontrast MR angiogram of the intracranial vasculature with multiple MIP reconstructions. Examination performed in conjunction with vessel wall imaging using black blood MRA technique. Double IR T1-weighted sequence performed in the axial and coronal planes before and after the administration 8 ml Gadavist intravenous contrast.    COMPARISON:   CTA 03/22/2017, MRI/MRA 05/13/2017, MRI 05/29/2017, and DSA 06/01/2017    FINDINGS:    MR angiogram again demonstrates multifocal areas of irregularity and narrowing in the intracranial vasculature. Areas of involvement include the proximal left PCA (P2 segment), proximal right superior cerebellar artery, as well as the distal A2 segment of the left anterior cerebral artery. Areas of stenosis appear fixed, grossly stable from the prior examination, allowing for variation in modality and technique. This would essentially exclude reversible cerebral vasoconstriction syndrome ([place]) from the differential diagnosis. There is no intrinsic T1 hyperintensity within the wall and multifocal nature would essentially exclude arterial dissection.    The vessel wall imaging portion of the examination was focused on the distal left ROMAIN stenosis. There is focal luminal narrowing, which extends over approximately a 7 mm segment. There is an eccentric wall thickening at this site (sequence 3 image 4) which is marked with arrows. These findings can be seen with atherosclerotic disease or vasculitis. Post contrast images demonstrate some subtle enhancement in this  region (sequence 6 image 4, sequence 5 image 4. Additional small focus of enhancement corresponding to an a small right ECA branch (additional site of narrowing identified on recent DSA) on sequence 5 image 6, which would favor vasculitis.      Enhancing focus in the right frontal lobe on postcontrast images, consistent with a subacute infarct.   Impression         Multifocal areas of intracranial arterial vascular narrowing as described above. Subtle wall thickening with mild postcontrast enhancement favor vasculitis over atherosclerosis. RCVS and dissection essentially excluded. Clinical correlation, with laboratory and CSF analysis suggested.      Electronically signed by: DALIA HUNG MD  Date: 06/02/17  Time: 17:07             Assessment/Plan:     Encephalopathy    58 yo M with PMH poorly controlled DM2, HLD, HTN, CVA, CHASE, internuclear ophthalmoplegia of the L eye seen in consult for possible vasculitis with cerebral angiogram showing multifocal areas of stenosis. Symptoms have included blurry vision and encephalopathy. MRI suggestive of demyelinating disease. Thorough autoimmune workup this far has been negative including REYMUNDO x 2, ANCA x 2, ACE, acl ab, C3, C4, SPEP, NMO also no clinical sign of systemic vasculitis. Infectious workup negative as well. PACNS is extremely rare and against it is elevated glucose (normal in PACNS) and lack of HA throughout which is most common feature of PACNS. He also developed worsening symptoms requiring readmission after 5 days of pulse steroids. Given history of poorly controlled DM, HTN, HLD, obesity atherosclerosis is still possible, however black blood MRI is read as more suggestive of vasculitis. Embolism also remains a possibility as pt was unable to have GIOVANNI. He was given CYC last night by neurology for suspected CNS vasculitis.     -A/w B2-glycoprotein, cryos   -Recommend CBC in 7 and 14 days for monitoring after CYC   -Consider prednisone 60mg daily but will defer  to neurology, also zantac daily to protect stomach on high dose steroids   -Recommend 1000mg Ca++ and 1000mg vit D daily, and check DXA as o/p as steroids thin the bones   -Recommend o/p ID fastrack referral for immunizations and will likely need PCP prophylaxis while immunosuppressed                Zan Levin,   Rheumatology  [facility]    Patient seen and examined with fellow.  All elements of history, physical exam and medical decision making independently confirmed by me.  Consult for possible CNS vasculitis in this complex patient with newly diagnosed multiple sclerosis, and poorly controlled DM2, HLD, HTN, CVA, CHASE, internuclear ophthalmoplegia of the L eye with cerebral angiogram showing multifocal areas of stenosis.  Patient with negative rheumatologic work up thus far. Clinical picture so far does not favor CNS vasculitis (sudden onset symptoms, no history of HA, symptoms worsened after pulse steroids). Started on cyclophophamide by Neurology last night after specialized MRI study (black blood study) felt to show more so vasculitis than atherosclerosis as cause of changes.  See recommendations above.  Will follow with you. See note for details.

## 2017-06-03 NOTE — SUBJECTIVE & OBJECTIVE
Neurologic Chief Complaint: MS vs cardio-embolic vs watershade stroke     Subjective:     Interval History: Patient is seen for follow-up neurological assessment and treatment recommendations:     Patient was given a dose of cytoxan for CNS vasculitis. Will need a dose once per month for three months. MRA brain consistent with CNS vasculitis. Still has urinary and fecal incontinence- neuro has no plans for this and believes this is secondary to infarct.     HPI, Past Medical, Family, and Social History remains the same as documented in the initial encounter.     Review of Systems   Constitutional: Negative for fever.   HENT: Negative for congestion.    Respiratory: Negative for shortness of breath.    Gastrointestinal: Negative for abdominal pain.   Genitourinary: Negative for dysuria.   Psychiatric/Behavioral: Positive for confusion.     Scheduled Meds:   atorvastatin  40 mg Oral Daily    diltiaZEM  60 mg Oral Q8H    insulin aspart  15 Units Subcutaneous TIDWM    insulin detemir  40 Units Subcutaneous QHS    levetiracetam  500 mg Oral BID    methylPREDNISolone (SOLU-Medrol) IVPB (doses > 250 mg)   Intravenous Once    pantoprazole  40 mg Oral BID    sertraline  150 mg Oral Daily    sodium chloride 0.9%  1,000 mL Intravenous Once    vitamin D  5,000 Units Oral Daily     Continuous Infusions:     PRN Meds:acetaminophen, dextrose 50%, dextrose 50%, dextrose 50%, dextrose 50%, glucagon (human recombinant), glucagon (human recombinant), glucagon (human recombinant), glucose, glucose, insulin aspart, omnipaque    Objective:     Vital Signs (Most Recent):  Temp: 98.3 °F (36.8 °C) (06/03/17 0748)  Pulse: 91 (06/03/17 1100)  Resp: 18 (06/03/17 0748)  BP: (!) 144/71 (06/03/17 0748)  SpO2: 95 % (06/03/17 0748)  BP Location: Right arm    Vital Signs Range (Last 24H):  Temp:  [97.1 °F (36.2 °C)-99.5 °F (37.5 °C)]   Pulse:  []   Resp:  [18-20]   BP: (128-163)/(70-83)   SpO2:  [95 %-99 %]   BP Location: Right  arm    Physical Exam   Constitutional: He appears well-developed and well-nourished.   HENT:   Head: Normocephalic and atraumatic.   Eyes: Conjunctivae and EOM are normal. Pupils are equal, round, and reactive to light.   Neck: Normal range of motion. Neck supple.   Cardiovascular: Normal rate and regular rhythm.    Murmur (systolic) heard.  Pulmonary/Chest: Effort normal and breath sounds normal.   Abdominal: Soft. Bowel sounds are normal. He exhibits no distension and no mass. There is no tenderness.   Musculoskeletal: He exhibits no edema or deformity.   Neurological: He is alert. No cranial nerve deficit. Coordination normal. GCS eye subscore is 4. GCS verbal subscore is 5. GCS motor subscore is 6.   Skin: Skin is warm and dry. No rash noted. No erythema.   Psychiatric: He has a normal mood and affect. His behavior is normal.   Nursing note and vitals reviewed.      Jarod Coma Scale:  Best Eye Response: 4  Best Motor Response: 6  Best Verbal Response: 5        Laboratory:  CMP:     Recent Labs  Lab 06/03/17  0501   CALCIUM 7.8*   ALBUMIN 2.4*   PROT 5.2*      K 4.0   CO2 20*   *   BUN 9   CREATININE 1.0   ALKPHOS 78   ALT 18   AST 16   BILITOT 0.4     CBC:     Recent Labs  Lab 06/03/17  0501   WBC 5.39  5.39  5.39   RBC 2.91*  2.91*  2.91*   HGB 8.5*  8.5*  8.5*   HCT 25.2*  25.2*  25.2*     164  164   MCV 87  87  87   MCH 29.2  29.2  29.2   MCHC 33.7  33.7  33.7     Lipid Panel: No results for input(s): CHOL, LDLCALC, HDL, TRIG in the last 168 hours.  Hgb A1C: No results for input(s): HGBA1C in the last 168 hours.  TSH:   No results for input(s): TSH in the last 168 hours.

## 2017-06-04 VITALS
OXYGEN SATURATION: 95 % | DIASTOLIC BLOOD PRESSURE: 62 MMHG | BODY MASS INDEX: 29.48 KG/M2 | TEMPERATURE: 99 F | HEIGHT: 71 IN | HEART RATE: 75 BPM | RESPIRATION RATE: 18 BRPM | SYSTOLIC BLOOD PRESSURE: 117 MMHG | WEIGHT: 210.56 LBS

## 2017-06-04 PROBLEM — E83.42 HYPOMAGNESEMIA: Status: ACTIVE | Noted: 2017-06-04

## 2017-06-04 PROBLEM — R32 URINARY INCONTINENCE: Status: ACTIVE | Noted: 2017-06-04
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
## 2017-06-04 PROBLEM — E87.6 HYPOKALEMIA: Status: ACTIVE | Noted: 2017-06-04
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
## 2017-06-04 LAB
ALBUMIN SERPL BCP-MCNC: 2.7 G/DL
ALP SERPL-CCNC: 81 U/L
ALT SERPL W/O P-5'-P-CCNC: 17 U/L
ANION GAP SERPL CALC-SCNC: 8 MMOL/L
AST SERPL-CCNC: 15 U/L
BASOPHILS # BLD AUTO: 0.02 K/UL
BASOPHILS NFR BLD: 0.4 %
BILIRUB SERPL-MCNC: 0.7 MG/DL
BUN SERPL-MCNC: 7 MG/DL
CALCIUM SERPL-MCNC: 8.4 MG/DL
CHLORIDE SERPL-SCNC: 108 MMOL/L
CO2 SERPL-SCNC: 26 MMOL/L
CREAT SERPL-MCNC: 0.8 MG/DL
DIFFERENTIAL METHOD: ABNORMAL
EOSINOPHIL # BLD AUTO: 0.2 K/UL
EOSINOPHIL NFR BLD: 3.7 %
ERYTHROCYTE [DISTWIDTH] IN BLOOD BY AUTOMATED COUNT: 14.8 %
EST. GFR  (AFRICAN AMERICAN): >60 ML/MIN/1.73 M^2
EST. GFR  (NON AFRICAN AMERICAN): >60 ML/MIN/1.73 M^2
GLUCOSE SERPL-MCNC: 103 MG/DL
HCT VFR BLD AUTO: 26.1 %
HGB BLD-MCNC: 9.1 G/DL
LYMPHOCYTES # BLD AUTO: 1.7 K/UL
LYMPHOCYTES NFR BLD: 37.7 %
MAGNESIUM SERPL-MCNC: 1.4 MG/DL
MCH RBC QN AUTO: 29.6 PG
MCHC RBC AUTO-ENTMCNC: 34.9 %
MCV RBC AUTO: 85 FL
MONOCYTES # BLD AUTO: 0.3 K/UL
MONOCYTES NFR BLD: 6.7 %
NEUTROPHILS # BLD AUTO: 2.4 K/UL
NEUTROPHILS NFR BLD: 51.3 %
PHOSPHATE SERPL-MCNC: 3.4 MG/DL
PLATELET # BLD AUTO: 183 K/UL
PMV BLD AUTO: 8.5 FL
POCT GLUCOSE: 135 MG/DL (ref 70–110)
POCT GLUCOSE: 141 MG/DL (ref 70–110)
POCT GLUCOSE: 206 MG/DL (ref 70–110)
POTASSIUM SERPL-SCNC: 3.2 MMOL/L
PROT SERPL-MCNC: 5.6 G/DL
RBC # BLD AUTO: 3.07 M/UL
SODIUM SERPL-SCNC: 142 MMOL/L
WBC # BLD AUTO: 4.61 K/UL

## 2017-06-04 PROCEDURE — 36415 COLL VENOUS BLD VENIPUNCTURE: CPT

## 2017-06-04 PROCEDURE — 85025 COMPLETE CBC W/AUTO DIFF WBC: CPT

## 2017-06-04 PROCEDURE — 25000003 PHARM REV CODE 250: Performed by: STUDENT IN AN ORGANIZED HEALTH CARE EDUCATION/TRAINING PROGRAM

## 2017-06-04 PROCEDURE — 97535 SELF CARE MNGMENT TRAINING: CPT

## 2017-06-04 PROCEDURE — 99238 HOSP IP/OBS DSCHRG MGMT 30/<: CPT | Mod: ,,, | Performed by: PSYCHIATRY & NEUROLOGY

## 2017-06-04 PROCEDURE — 25000003 PHARM REV CODE 250: Performed by: HOSPITALIST

## 2017-06-04 PROCEDURE — 25000003 PHARM REV CODE 250: Performed by: NURSE PRACTITIONER

## 2017-06-04 PROCEDURE — 84100 ASSAY OF PHOSPHORUS: CPT

## 2017-06-04 PROCEDURE — 25000003 PHARM REV CODE 250: Performed by: INTERNAL MEDICINE

## 2017-06-04 PROCEDURE — 63600175 PHARM REV CODE 636 W HCPCS: Performed by: INTERNAL MEDICINE

## 2017-06-04 PROCEDURE — 83735 ASSAY OF MAGNESIUM: CPT

## 2017-06-04 PROCEDURE — 80053 COMPREHEN METABOLIC PANEL: CPT

## 2017-06-04 RX ORDER — MAGNESIUM SULFATE HEPTAHYDRATE 40 MG/ML
2 INJECTION, SOLUTION INTRAVENOUS
Status: COMPLETED | OUTPATIENT
Start: 2017-06-04 | End: 2017-06-04

## 2017-06-04 RX ORDER — PREDNISONE 20 MG/1
60 TABLET ORAL DAILY
Status: DISCONTINUED | OUTPATIENT
Start: 2017-06-04 | End: 2017-06-04 | Stop reason: HOSPADM

## 2017-06-04 RX ORDER — LEVETIRACETAM 500 MG/1
500 TABLET ORAL 2 TIMES DAILY
Qty: 60 TABLET | Refills: 2 | Status: SHIPPED | OUTPATIENT
Start: 2017-06-04 | End: 2017-09-20 | Stop reason: SDUPTHER

## 2017-06-04 RX ORDER — POTASSIUM CHLORIDE 20 MEQ/1
40 TABLET, EXTENDED RELEASE ORAL
Status: COMPLETED | OUTPATIENT
Start: 2017-06-04 | End: 2017-06-04

## 2017-06-04 RX ORDER — PREDNISONE 20 MG/1
60 TABLET ORAL DAILY
Qty: 30 TABLET | Refills: 1 | Status: SHIPPED | OUTPATIENT
Start: 2017-06-04 | End: 2017-06-14

## 2017-06-04 RX ADMIN — MAGNESIUM SULFATE HEPTAHYDRATE 2 G: 40 INJECTION, SOLUTION INTRAVENOUS at 10:06

## 2017-06-04 RX ADMIN — PANTOPRAZOLE SODIUM 40 MG: 40 TABLET, DELAYED RELEASE ORAL at 08:06

## 2017-06-04 RX ADMIN — VITAMIN D, TAB 1000IU (100/BT) 5000 UNITS: 25 TAB at 08:06

## 2017-06-04 RX ADMIN — DILTIAZEM HYDROCHLORIDE 60 MG: 60 TABLET, FILM COATED ORAL at 01:06

## 2017-06-04 RX ADMIN — SERTRALINE HYDROCHLORIDE 150 MG: 50 TABLET ORAL at 08:06

## 2017-06-04 RX ADMIN — POTASSIUM CHLORIDE 40 MEQ: 1500 TABLET, EXTENDED RELEASE ORAL at 10:06

## 2017-06-04 RX ADMIN — MAGNESIUM SULFATE HEPTAHYDRATE 2 G: 40 INJECTION, SOLUTION INTRAVENOUS at 08:06

## 2017-06-04 RX ADMIN — PREDNISONE 60 MG: 20 TABLET ORAL at 02:06

## 2017-06-04 RX ADMIN — ATORVASTATIN CALCIUM 40 MG: 20 TABLET, FILM COATED ORAL at 08:06

## 2017-06-04 RX ADMIN — INSULIN ASPART 15 UNITS: 100 INJECTION, SOLUTION INTRAVENOUS; SUBCUTANEOUS at 01:06

## 2017-06-04 RX ADMIN — POTASSIUM CHLORIDE 40 MEQ: 1500 TABLET, EXTENDED RELEASE ORAL at 08:06

## 2017-06-04 RX ADMIN — DILTIAZEM HYDROCHLORIDE 60 MG: 60 TABLET, FILM COATED ORAL at 05:06

## 2017-06-04 RX ADMIN — LEVETIRACETAM 500 MG: 500 TABLET, FILM COATED ORAL at 08:06

## 2017-06-04 RX ADMIN — INSULIN ASPART 15 UNITS: 100 INJECTION, SOLUTION INTRAVENOUS; SUBCUTANEOUS at 08:06

## 2017-06-04 NOTE — PLAN OF CARE
[facility]    HOME HEALTH ORDERS  FACE TO FACE ENCOUNTER    Patient Name: Bessy Nunez  YOB: 1960    PCP: Edgar Nova MD   PCP Address: [address] / [place] [address]  PCP Phone Number: [phone]  PCP Fax: [phone]    Encounter Date: 06/04/2017    Admit to Home Health    Diagnoses:  Active Hospital Problems    Diagnosis  POA    *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]  No    Fall [W19.XXXA]  Yes     Chronic    Hypomagnesemia [E83.42]  No    Hypokalemia [E87.6]  No    Urinary incontinence [R32]  Yes    Acute blood loss anemia [D62]  Unknown    Upper GI bleed [K92.2]  Yes    Encephalopathy [G93.40]  Yes    Acute encephalopathy [G93.40]  Unknown    Leukocytosis [D72.829]  Yes    Diplopia [H53.2]  Yes    Syncope [R55]  Yes    Type 2 diabetes mellitus with diabetic polyneuropathy, with long-term current use of insulin [E11.42, Z79.4]  Not Applicable    Ataxia [R27.0]  Yes    Demyelinating changes in brain [G37.9]  Yes     By mri.  Several active lesions, many old.  5/13: MRA brain/neck, MRI brain w/wo contrast show no vascular occlusion, multiple foci of hyperintensity in deep cerebral periventricular white matter in pattern of demyelinating process.   5/17: MRI spine performed, no spinal cord lesions.     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  5/17: MRI spine performed, no spinal cord lesions.      NAFLD (nonalcoholic fatty liver disease) [K76.0]  Yes     Chronic    Type 2 diabetes mellitus with hyperglycemia, with long-term current use of insulin [E11.65, Z79.4]  Not Applicable    Obesity [E66.9]  Yes     Chronic    Sleep apnea [G47.30]  Yes     Chronic    Depressive disorder, not elsewhere classified [F32.9]  Yes     Chronic    Essential hypertension  [I10]  Yes     Chronic    Mixed hyperlipidemia [E78.2]  Yes     Chronic      Resolved Hospital Problems    Diagnosis Date Resolved POA   No resolved problems to display.       Future Appointments  Date Time Provider Department Center   6/30/2017 2:00 PM Jez Espinoza MD [facility] [place]   8/31/2017 2:15 PM Isael Mixon MD [facility] [place]           I have seen and examined this patient face to face today. My clinical findings that support the need for the home health skilled services and home bound status are the following:  Weakness/numbness causing balance and gait disturbance due to CNS Vasculitis making it taxing to leave home.    Allergies:Review of patient's allergies indicates:  No Known Allergies    Diet: diabetic diet: 2000 calorie    Activities: activity as tolerated    Nursing:   SN to complete comprehensive assessment including routine vital signs. Instruct on disease process and s/s of complications to report to MD. Review/verify medication list sent home with the patient at time of discharge  and instruct patient/caregiver as needed. Frequency may be adjusted depending on start of care date.    Notify MD if SBP > 160 or < 90; DBP > 90 or < 50; HR > 120 or < 50; Temp > 101;      CONSULTS:    Physical Therapy to evaluate and treat. Evaluate for home safety and equipment needs; Establish/upgrade home exercise program. Perform / instruct on therapeutic exercises, gait training, transfer training, and Range of Motion.  Occupational Therapy to evaluate and treat. Evaluate home environment for safety and equipment needs. Perform/Instruct on transfers, ADL training, ROM, and therapeutic exercises.  Speech Therapy  to evaluate and treat for  Language, Swallowing and Cognition.   to evaluate for community resources/long-range planning.    MISCELLANEOUS CARE:  Diabetic Care:   SN to perform and educate Diabetic management with blood glucose monitoring: and Report CBG < 60 or > 350  "to physician.    WOUND CARE ORDERS  n/a      Medications: Review discharge medications with patient and family and provide education.      Current Discharge Medication List      START taking these medications    Details   levetiracetam (KEPPRA) 500 MG Tab Take 1 tablet (500 mg total) by mouth 2 (two) times daily.  Qty: 60 tablet, Refills: 2      predniSONE (DELTASONE) 20 MG tablet Take 3 tablets (60 mg total) by mouth once daily.  Qty: 30 tablet, Refills: 1      ranitidine (ZANTAC) 150 MG tablet Take 1 tablet (150 mg total) by mouth 2 (two) times daily.  Qty: 60 tablet, Refills: 11         CONTINUE these medications which have NOT CHANGED    Details   aspirin (ECOTRIN) 81 MG EC tablet Take 81 mg by mouth once daily.      atenolol (TENORMIN) 50 MG tablet Take 1 tablet (50 mg total) by mouth once daily.  Qty: 90 tablet, Refills: 3    Associated Diagnoses: Essential hypertension      atorvastatin (LIPITOR) 40 MG tablet Take 1 tablet (40 mg total) by mouth once daily.  Qty: 90 tablet, Refills: 3    Associated Diagnoses: Other and unspecified hyperlipidemia      diltiaZEM (DILACOR XR) 240 MG CDCR Take 1 capsule (240 mg total) by mouth once daily.  Qty: 90 capsule, Refills: 3    Associated Diagnoses: Essential hypertension      insulin degludec (TRESIBA FLEXTOUCH U-200) 200 unit/mL (3 mL) InPn Inject 42 Units into the skin every evening.  Qty: 3 Syringe, Refills: 1      insulin needles, disposable, (BD ULTRA-FINE ANA PEN NEEDLES) 32 x 5/32 " Ndle Inject twice daily  Qty: 100 each, Refills: 3      liraglutide 0.6 mg/0.1 mL, 18 mg/3 mL, subq PNIJ (VICTOZA 3-TOMASZ) 0.6 mg/0.1 mL (18 mg/3 mL) PnIj Inject 1.8 mg into the skin once daily.  Qty: 9 mL, Refills: 11      metformin (GLUCOPHAGE-XR) 500 MG 24 hr tablet Take 2 tablets (1,000 mg total) by mouth 2 (two) times daily with meals.  Qty: 360 tablet, Refills: 0      omega-3 fatty acids-vitamin E (FISH OIL) 1,000 mg Cap Take 1 capsule by mouth 2 (two) times daily.  Refills: 0    " Associated Diagnoses: Hypertriglyceridemia      sertraline (ZOLOFT) 100 MG tablet 1 and half tablet daily  Qty: 135 tablet, Refills: 3    Associated Diagnoses: Depressive disorder, not elsewhere classified      valsartan (DIOVAN) 320 MG tablet Take 1 tablet (320 mg total) by mouth once daily.  Qty: 90 tablet, Refills: 3    Associated Diagnoses: Essential hypertension      vitamin D 1000 units Tab Take 5 tablets (5,000 Units total) by mouth once daily.         STOP taking these medications       methylPREDNISolone (MEDROL DOSEPACK) 4 mg tablet Comments:   Reason for Stopping:               I certify that this patient is confined to his home and needs physical therapy, speech therapy and occupational therapy.

## 2017-06-04 NOTE — PLAN OF CARE
3:04 PM. On call JULIAN received page from pts MD informing of hh orders bedside commode and bath bench orders that have been placde. JULIAN informed MD that pt does not have the HH skilled benefit due to his insurance however he could go to out pt PT/OT. JULIAN also informed MD that no insurance covers a bath bench and that will be $65 out of pocket. JULIAN sent orders for bedside commode to [facility] at  [phone] [facility] dme expecting orders. Will be delivered to pts room.    CHUNG Lees LMSW  [id number]

## 2017-06-04 NOTE — PLAN OF CARE
Problem: Patient Care Overview  Goal: Plan of Care Review  Outcome: Ongoing (interventions implemented as appropriate)  No acute changes, falls, or events overnight. POC reviewed with pt and wife at start of shift. No questions expressed. Tele, PIV, SCDs in place. Pt denies pain/nausea/new symptoms overnight. Speech is intact and without delays. DAREN DUARTE.

## 2017-06-04 NOTE — SIGNIFICANT EVENT
Called patient's wife this afternoon to discuss discharge planning. Patient's wife was very concerned about Mr. Nunez leaving the hospital while he's so altered. Ms. Nunez expressed concerns that her  would get worse and have to come back to the hospital like the last time he was here. I expressed empathy to her concerns and asked her how I could help her and assist her. I explained to her that our therapy teams have seen her and have recommended outpatient therapy with the exception of speech therapy who recommended inpatient rehab. Relayed that we discussed collectively with our consulting providers who agreed that patient would be safe for discharge. Provided anticipatory guidance and explained that her 's inflammation of his brain may take time to recover but that we would be sending him home with prolonged course of high dose steroids. I also prepared her for the possibility that he may never regain mental capacity, but that at this time keeping him in the hospital would likely cause him more harm than good. She expressed understanding to this but understandably was very concerned about her 's wellbeing. She requested information on how to obtain his records which I provided to her. We also discussed the possibility of contesting the discharge at her request. I relayed to her that this is her right, but we would obviously love to help avoid that if possible if we could. At this time, she was unable to express how we could help her or what we could explain to her. She reports that she will think about it on her way up here and let us know once she's collected her thoughts.    Jess Sprague MD, PGY2  Pager [phone]  Staff recommendations to follow.

## 2017-06-04 NOTE — SUBJECTIVE & OBJECTIVE
Neurologic Chief Complaint: MS vs cardio-embolic vs watershade stroke     Subjective:     Interval History: Patient is seen for follow-up neurological assessment and treatment recommendations:     HALINA. Planning on discharging home today with maintenance prednisone. Patient up and walking about without difficulty.     HPI, Past Medical, Family, and Social History remains the same as documented in the initial encounter.     Review of Systems   Constitutional: Negative for fever.   HENT: Negative for congestion.    Respiratory: Negative for shortness of breath.    Gastrointestinal: Negative for abdominal pain.   Genitourinary: Negative for dysuria.   Psychiatric/Behavioral: Positive for confusion.     Scheduled Meds:   atorvastatin  40 mg Oral Daily    diltiaZEM  60 mg Oral Q8H    insulin aspart  15 Units Subcutaneous TIDWM    insulin detemir  40 Units Subcutaneous QHS    levetiracetam  500 mg Oral BID    methylPREDNISolone (SOLU-Medrol) IVPB (doses > 250 mg)   Intravenous Once    pantoprazole  40 mg Oral BID    predniSONE  60 mg Oral Daily    sertraline  150 mg Oral Daily    sodium chloride 0.9%  1,000 mL Intravenous Once    vitamin D  5,000 Units Oral Daily     Continuous Infusions:     PRN Meds:acetaminophen, dextrose 50%, dextrose 50%, dextrose 50%, dextrose 50%, glucagon (human recombinant), glucagon (human recombinant), glucagon (human recombinant), glucose, glucose, insulin aspart, omnipaque    Objective:     Vital Signs (Most Recent):  Temp: 98.2 °F (36.8 °C) (06/04/17 1110)  Pulse: 81 (06/04/17 1110)  Resp: 18 (06/04/17 1110)  BP: 135/64 (06/04/17 1110)  SpO2: 97 % (06/04/17 1110)  BP Location: Right arm    Vital Signs Range (Last 24H):  Temp:  [97.9 °F (36.6 °C)-98.6 °F (37 °C)]   Pulse:  [60-81]   Resp:  [17-20]   BP: (130-146)/(63-77)   SpO2:  [94 %-97 %]   BP Location: Right arm    Physical Exam   Constitutional: He appears well-developed and well-nourished.   HENT:   Head: Normocephalic and  atraumatic.   Eyes: Conjunctivae and EOM are normal. Pupils are equal, round, and reactive to light.   Neck: Normal range of motion. Neck supple.   Cardiovascular: Normal rate and regular rhythm.    Murmur (systolic) heard.  Pulmonary/Chest: Effort normal and breath sounds normal.   Abdominal: Soft. Bowel sounds are normal. He exhibits no distension and no mass. There is no tenderness.   Musculoskeletal: He exhibits no edema or deformity.   Neurological: He is alert. No cranial nerve deficit. Coordination normal. GCS eye subscore is 4. GCS verbal subscore is 5. GCS motor subscore is 6.   Skin: Skin is warm and dry. No rash noted. No erythema.   Psychiatric: He has a normal mood and affect. His behavior is normal.   Nursing note and vitals reviewed.      Jarod Coma Scale:  Best Eye Response: 4  Best Motor Response: 6  Best Verbal Response: 5        Laboratory:  CMP:     Recent Labs  Lab 06/04/17  0434   CALCIUM 8.4*   ALBUMIN 2.7*   PROT 5.6*      K 3.2*   CO2 26      BUN 7   CREATININE 0.8   ALKPHOS 81   ALT 17   AST 15   BILITOT 0.7     CBC:     Recent Labs  Lab 06/04/17  0434   WBC 4.61   RBC 3.07*   HGB 9.1*   HCT 26.1*      MCV 85   MCH 29.6   MCHC 34.9     Lipid Panel: No results for input(s): CHOL, LDLCALC, HDL, TRIG in the last 168 hours.  Hgb A1C: No results for input(s): HGBA1C in the last 168 hours.  TSH:   No results for input(s): TSH in the last 168 hours.

## 2017-06-04 NOTE — DISCHARGE SUMMARY
[facility]  Vascular Neurology  Comprehensive Stroke Center  Discharge Summary     Summary:     Admit Date: 5/23/2017 11:06 PM    Discharge Date and Time:  06/04/2017 2:09 PM    Attending Physician: Derrick Agudelo MD     Discharge Provider: Jess Sprague MD    History of Present Illness: Bessy Nunez is a 57 y.o. Male with a PMHx of HTN, DM, and demyelinating disease who was admitted to the stroke service about 2 weeks ago for diplopia, unsteady gait, and JAYDEN. Demyelinating disease was discovered and patient was transferred to hospital medicine with general neurology consult. He was discharged home on steroids. Patient returned to hospital after he had a fall and was nonverbal. Patient has been having intermittent aphasia, inattention, and delayed response. EEG was negative for seizures and labs WNL. LP from previous admission did not show evidence of MS. Repeat MRI revealed new lesions with one lesion in R frontal lobe concerning for stroke. Stroke team was consulted for further input.    Hospital Course (synopsis of major diagnoses, care, treatment, and services provided during the course of the hospital stay): 56 y/o male with possible MS treated with steroids on last admission. Patient has been having intermittent aphasia, inattention, and delayed response. Repeat MRI revealed new lesions with one lesion in R frontal lobe concerning for stroke but very degrade by motion. Patient with no real focal neuro deficits, moving all extremities, flat affect, follows commands but has inattention and delayed response. Symptoms are waxing and waning.     5/29: Repeated MRI is limited 2/2 motion artifact, revealing multiple scattered foci suggesting demyelination vs stroke. Given recent MRI there suspicion of cardio-embolic phenomenon. Plan for GIOVANNI after EGD given h/o melena and drop in H/H.        5/30: EGD done which revealed duodenal and gastric ulcer treated with cautery. Bedside Echo WNL. Bcx  NGTD. Cardiology postponed GIOVANNI given multiple ulcers seen during EGD.  Awaiting spine MRI     6/2: Cardiology deferred GIOVANNI to be done in the out patient settings since endocarditis is low in DDX. Neurology are suspecting vasculitis, w/u is underway. Cerebral angio revealing multifocal stenosis mainly in the anterior circulation that is consisting with vasculitis or multifocal atherosclerosis. 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 Pt is off steroid       6/3: Patient was given a dose of cytoxan for CNS vasculitis. Will need a dose once per month for three months. MRA brain consistent with CNS vasculitis. Still has urinary and fecal incontinence- neuro has no plans for this and believes this is secondary to infarct.     6/4: Patient discharging home with home health. Will be started on maintenance prednisone 60 mg and will need follow up with Dr. Love in 2-3 weeks following discharge.     Neurologic Chief Complaint: MS vs cardio-embolic vs watershade stroke     Subjective:     Interval History: Patient is seen for follow-up neurological assessment and treatment recommendations:     HALINA. Planning on discharging home today with maintenance prednisone. Patient up and walking about without difficulty.     HPI, Past Medical, Family, and Social History remains the same as documented in the initial encounter.     Review of Systems   Constitutional: Negative for fever.   HENT: Negative for congestion.    Respiratory: Negative for shortness of breath.    Gastrointestinal: Negative for abdominal pain.   Genitourinary: Negative for dysuria.   Psychiatric/Behavioral: Positive for confusion.     Scheduled Meds:   atorvastatin  40 mg Oral Daily    diltiaZEM  60 mg Oral Q8H    insulin aspart  15 Units Subcutaneous TIDWM    insulin detemir  40 Units Subcutaneous QHS    levetiracetam  500 mg Oral BID    methylPREDNISolone (SOLU-Medrol) IVPB (doses >  250 mg)   Intravenous Once    pantoprazole  40 mg Oral BID    predniSONE  60 mg Oral Daily    sertraline  150 mg Oral Daily    sodium chloride 0.9%  1,000 mL Intravenous Once    vitamin D  5,000 Units Oral Daily     Continuous Infusions:     PRN Meds:acetaminophen, dextrose 50%, dextrose 50%, dextrose 50%, dextrose 50%, glucagon (human recombinant), glucagon (human recombinant), glucagon (human recombinant), glucose, glucose, insulin aspart, omnipaque    Objective:     Vital Signs (Most Recent):  Temp: 98.2 °F (36.8 °C) (06/04/17 1110)  Pulse: 81 (06/04/17 1110)  Resp: 18 (06/04/17 1110)  BP: 135/64 (06/04/17 1110)  SpO2: 97 % (06/04/17 1110)  BP Location: Right arm    Vital Signs Range (Last 24H):  Temp:  [97.9 °F (36.6 °C)-98.6 °F (37 °C)]   Pulse:  [60-81]   Resp:  [17-20]   BP: (130-146)/(63-77)   SpO2:  [94 %-97 %]   BP Location: Right arm    Physical Exam   Constitutional: He appears well-developed and well-nourished.   HENT:   Head: Normocephalic and atraumatic.   Eyes: Conjunctivae and EOM are normal. Pupils are equal, round, and reactive to light.   Neck: Normal range of motion. Neck supple.   Cardiovascular: Normal rate and regular rhythm.    Murmur (systolic) heard.  Pulmonary/Chest: Effort normal and breath sounds normal.   Abdominal: Soft. Bowel sounds are normal. He exhibits no distension and no mass. There is no tenderness.   Musculoskeletal: He exhibits no edema or deformity.   Neurological: He is alert. No cranial nerve deficit. Coordination normal. GCS eye subscore is 4. GCS verbal subscore is 5. GCS motor subscore is 6.   Skin: Skin is warm and dry. No rash noted. No erythema.   Psychiatric: He has a normal mood and affect. His behavior is normal.   Nursing note and vitals reviewed.      Jarod Coma Scale:  Best Eye Response: 4  Best Motor Response: 6  Best Verbal Response: 5        Laboratory:  CMP:     Recent Labs  Lab 06/04/17  0434   CALCIUM 8.4*   ALBUMIN 2.7*   PROT 5.6*      K  3.2*   CO2 26      BUN 7   CREATININE 0.8   ALKPHOS 81   ALT 17   AST 15   BILITOT 0.7     CBC:     Recent Labs  Lab 06/04/17  0434   WBC 4.61   RBC 3.07*   HGB 9.1*   HCT 26.1*      MCV 85   MCH 29.6   MCHC 34.9     Lipid Panel: No results for input(s): CHOL, LDLCALC, HDL, TRIG in the last 168 hours.  Hgb A1C: No results for input(s): HGBA1C in the last 168 hours.  TSH:   No results for input(s): TSH in the last 168 hours.        Assessment/Plan:     Interventions: None    Complications: None    Research Candidate?:  No    Neurological deficit at discharge: Confusion     Disposition:     Final Active Diagnoses:    Diagnosis Date Noted POA    PRINCIPAL PROBLEM:  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] 06/02/2017 No    Fall [W19.XXXA] 06/04/2017 Yes     Chronic    Hypomagnesemia [E83.42] 06/04/2017 No    Hypokalemia [E87.6] 06/04/2017 No    Urinary incontinence [R32] 06/04/2017 Yes    Acute blood loss anemia [D62] 05/28/2017 Unknown    Upper GI bleed [K92.2] 05/28/2017 Yes    Encephalopathy [G93.40] 05/26/2017 Yes    Acute encephalopathy [G93.40]  Unknown    Leukocytosis [D72.829] 05/24/2017 Yes    Diplopia [H53.2] 05/24/2017 Yes    Syncope [R55] 05/23/2017 Yes    Type 2 diabetes mellitus with diabetic polyneuropathy, with long-term current use of insulin [E11.42, Z79.4] 05/14/2017 Not Applicable    Ataxia [R27.0] 05/13/2017 Yes    Demyelinating changes in brain [G37.9] 05/13/2017 Yes    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] 05/13/2017 Yes    NAFLD (nonalcoholic fatty liver disease) [K76.0] 10/11/2013 Yes     Chronic    Type 2 diabetes mellitus with hyperglycemia, with long-term current use of insulin [E11.65, Z79.4] 10/11/2013 Not Applicable    Obesity [E66.9] 10/11/2013 Yes     Chronic    Sleep apnea [G47.30] 10/11/2013 Yes     Chronic    Depressive disorder, not elsewhere classified [F32.9] 11/09/2012 Yes     Chronic    Essential hypertension [I10] 11/09/2012 Yes     Chronic    Mixed  "hyperlipidemia [E78.2] 11/09/2012 Yes     Chronic      Problems Resolved During this Admission:    Diagnosis Date Noted Date Resolved POA     No new Assessment & Plan notes have been filed under this hospital service since the last note was generated.  Service: Vascular Neurology      Recommendations:     Post-discharge complication risks: None    Stroke Education given to: patient and family    Follow-up in Neurology Clinic in 14-21 days    Discharge Plan:  Prednisone 60 mg daily until follow up with Dr. Love.     Follow Up:    Patient Instructions:     BATH/SHOWER CHAIR FOR HOME USE   Order Specific Question Answer Comments   Height: 5' 11" (1.803 m)    Weight: 95.5 kg (210 lb 8.6 oz)    Does patient have medical equipment at home? none    Length of need (1-99 months): 99    Type: With back      COMMODE FOR HOME USE   Order Specific Question Answer Comments   Type: Standard    Height: 5' 11" (1.803 m)    Weight: 95.5 kg (210 lb 8.6 oz)    Does patient have medical equipment at home? none    Length of need (1-99 months): 99      Ambulatory Referral to Neurology   Referral Priority: Routine Referral Type: Consultation   Referral Reason: Specialty Services Required    Referred to Provider: MANJINDER LOVE Requested Specialty: Neurology   Number of Visits Requested: 1        Medications:  Reconciled Home Medications:   Current Discharge Medication List      START taking these medications    Details   levetiracetam (KEPPRA) 500 MG Tab Take 1 tablet (500 mg total) by mouth 2 (two) times daily.  Qty: 60 tablet, Refills: 2      predniSONE (DELTASONE) 20 MG tablet Take 3 tablets (60 mg total) by mouth once daily.  Qty: 30 tablet, Refills: 1      ranitidine (ZANTAC) 150 MG tablet Take 1 tablet (150 mg total) by mouth 2 (two) times daily.  Qty: 60 tablet, Refills: 11         CONTINUE these medications which have NOT CHANGED    Details   aspirin (ECOTRIN) 81 MG EC tablet Take 81 mg by mouth once daily.      atenolol " "(TENORMIN) 50 MG tablet Take 1 tablet (50 mg total) by mouth once daily.  Qty: 90 tablet, Refills: 3    Associated Diagnoses: Essential hypertension      atorvastatin (LIPITOR) 40 MG tablet Take 1 tablet (40 mg total) by mouth once daily.  Qty: 90 tablet, Refills: 3    Associated Diagnoses: Other and unspecified hyperlipidemia      diltiaZEM (DILACOR XR) 240 MG CDCR Take 1 capsule (240 mg total) by mouth once daily.  Qty: 90 capsule, Refills: 3    Associated Diagnoses: Essential hypertension      insulin degludec (TRESIBA FLEXTOUCH U-200) 200 unit/mL (3 mL) InPn Inject 42 Units into the skin every evening.  Qty: 3 Syringe, Refills: 1      insulin needles, disposable, (BD ULTRA-FINE ANA PEN NEEDLES) 32 x 5/32 " Ndle Inject twice daily  Qty: 100 each, Refills: 3      liraglutide 0.6 mg/0.1 mL, 18 mg/3 mL, subq PNIJ (VICTOZA 3-TOMASZ) 0.6 mg/0.1 mL (18 mg/3 mL) PnIj Inject 1.8 mg into the skin once daily.  Qty: 9 mL, Refills: 11      metformin (GLUCOPHAGE-XR) 500 MG 24 hr tablet Take 2 tablets (1,000 mg total) by mouth 2 (two) times daily with meals.  Qty: 360 tablet, Refills: 0      omega-3 fatty acids-vitamin E (FISH OIL) 1,000 mg Cap Take 1 capsule by mouth 2 (two) times daily.  Refills: 0    Associated Diagnoses: Hypertriglyceridemia      sertraline (ZOLOFT) 100 MG tablet 1 and half tablet daily  Qty: 135 tablet, Refills: 3    Associated Diagnoses: Depressive disorder, not elsewhere classified      valsartan (DIOVAN) 320 MG tablet Take 1 tablet (320 mg total) by mouth once daily.  Qty: 90 tablet, Refills: 3    Associated Diagnoses: Essential hypertension      vitamin D 1000 units Tab Take 5 tablets (5,000 Units total) by mouth once daily.         STOP taking these medications       methylPREDNISolone (MEDROL DOSEPACK) 4 mg tablet Comments:   Reason for Stopping:               Jess Sprague MD  [facility]  Department of Vascular Neurology   [facility]   "

## 2017-06-04 NOTE — NURSING
DME BSC at bedside. Pt wife notified that BSC is for pt. Pt wife verbalized understanding and requisition placed in wife hand by pt.

## 2017-06-04 NOTE — PLAN OF CARE
Problem: Occupational Therapy Goal  Goal: Occupational Therapy Goal  Goals to be met by: 06/10/17     Patient will increase functional independence with ADLs by performing:    Feeding with Modified [place].  UE Dressing with Supervision  Dressing with SBA.  Grooming while seated with Set-up Assistance.  Toileting from bedside commode with Min Assistance for hygiene and clothing management.   Supine to sit with Supervision  Toilet transfer to bedside commode with SBA with RW.  Upper extremity exercise program x12 reps per handout, with supervision.      Outcome: Ongoing (interventions implemented as appropriate)  Goals remain appropriate, continue with OT POC.

## 2017-06-04 NOTE — PT/OT/SLP PROGRESS
"Occupational Therapy  Treatment    Bessy Nunez   MRN: [id number]   Admitting Diagnosis: CNS vasculitis    OT Date of Treatment: 06/04/17   OT Start Time: 1015  OT Stop Time: 1038  OT Total Time (min): 23 min    Billable Minutes:  Self Care/Home Management 23    General Precautions: Standard, fall, aspiration  Orthopedic Precautions: N/A  Braces: N/A    Do you have any cultural, spiritual, Zoroastrianism conflicts, given your current situation?: None stated    Subjective:  "I am not sure that I had a stroke."  Communicated with RN prior to session.  Pt agreeable to therapy.   Pain/Comfort  Pain Rating 1: 0/10  Pain Rating Post-Intervention 1: 0/10    Objective:  Patient found with: telemetry, peripheral IV     Functional Mobility:  Bed Mobility:  Rolling/Turning to Left: Modified independent, With side rail  Rolling/Turning Right: Modified independent, With side rail  Scooting/Bridging: Modified Independent, With side rail  Supine to Sit: Modified Independent, WIth side rail  Sit to Supine: Modified Independent    Transfers:   Sit <> Stand Assistance: Supervision  Sit <> Stand Assistive Device: No Assistive Device    Functional Ambulation: supervision to sink with no AD    Activities of Daily Living:  Grooming Position: Seated at sink  Grooming Level of Assistance: Supervision  Toileting Where Assessed: Bed level  Toileting Level of Assistance: Maximum assistance (pt had urinary incontinence and sheets/gown were wet. Pt did not seem to notice. Sheets/gowns/socks changed. Max A overall for clean up)  Pt encouraged to use bedside urinal.     Balance:   Static Sit: NORMAL: No deviations seen in posture held statically  Dynamic Sit: NORMAL: No deviations seen in posture held dynamically  Static Stand: GOOD-: Takes MODERATE challenges from all directions inconsistently  Dynamic stand: FAIR+: Needs CLOSE SUPERVISION during gait and is able to right self with minor LOB    AM-PAC 6 CLICK ADL   How much help from another person " "does this patient currently need?   1 = Unable, Total/Dependent Assistance  2 = A lot, Maximum/Moderate Assistance  3 = A little, Minimum/Contact Guard/Supervision  4 = None, Modified [place]/Independent    Putting on and taking off regular lower body clothing? : 3  Bathing (including washing, rinsing, drying)?: 3  Toileting, which includes using toilet, bedpan, or urinal? : 3  Putting on and taking off regular upper body clothing?: 3  Taking care of personal grooming such as brushing teeth?: 4  Eating meals?: 4  Total Score: 20     AM-PAC Raw Score CMS "G-Code Modifier Level of Impairment Assistance   6 % Total / Unable   7 - 8 CM 80 - 100% Maximal Assist   9-13 CL 60 - 80% Moderate Assist   14 - 19 CK 40 - 60% Moderate Assist   20 - 22 CJ 20 - 40% Minimal Assist   23 CI 1-20% SBA / CGA   24 CH 0% Independent/ Mod I       Patient left HOB elevated with all lines intact and call button in reach. Bed alarm on.     ASSESSMENT:  Bessy Nunez is a 57 y.o. male with a medical diagnosis of CNS vasculitis and presents with decline in ADLs and functional mobility. Pt would benefit from skilled OT services in order to maximize independence with ADLs and facilitate safe discharge.    Rehab identified problem list/impairments: Rehab identified problem list/impairments: weakness, impaired self care skills, impaired functional mobilty    Rehab potential is good.    Activity tolerance: Good    Discharge recommendations: Discharge Facility/Level Of Care Needs: home health OT     Barriers to discharge: Barriers to Discharge: Inaccessible home environment    Equipment recommendations: bedside commode, shower chair     GOALS:    Occupational Therapy Goals        Problem: Occupational Therapy Goal    Goal Priority Disciplines Outcome Interventions   Occupational Therapy Goal     OT, PT/OT Ongoing (interventions implemented as appropriate)    Description:  Goals to be met by: 06/10/17     Patient will increase functional " independence with ADLs by performing:    Feeding with Modified [place].  UE Dressing with Supervision  Dressing with SBA.  Grooming while seated with Set-up Assistance.  Toileting from bedside commode with Min Assistance for hygiene and clothing management.   Supine to sit with Supervision  Toilet transfer to bedside commode with SBA with RW.  Upper extremity exercise program x12 reps per handout, with supervision.                       Plan:  Patient to be seen 3 x/week to address the above listed problems via self-care/home management, therapeutic activities, therapeutic exercises, neuromuscular re-education  Plan of Care expires: 07/01/17  Plan of Care reviewed with: patient, spouse      COLTON Garza  06/04/2017

## 2017-06-04 NOTE — PLAN OF CARE
Problem: Fall Risk (Adult)  Goal: Absence of Falls  Patient will demonstrate the desired outcomes by discharge/transition of care.   Outcome: Ongoing (interventions implemented as appropriate)  Pt will remain free of falls during hospital stay.

## 2017-06-06 ENCOUNTER — PATIENT OUTREACH (OUTPATIENT)
Dept: ADMINISTRATIVE | Facility: CLINIC | Age: 57
End: 2017-06-06
Payer: MEDICAID

## 2017-06-06 ENCOUNTER — TELEPHONE (OUTPATIENT)
Dept: NEUROSURGERY | Facility: HOSPITAL | Age: 57
End: 2017-06-06

## 2017-06-06 ENCOUNTER — TELEPHONE (OUTPATIENT)
Dept: NEUROLOGY | Facility: CLINIC | Age: 57
End: 2017-06-06

## 2017-06-06 LAB
B2 GLYCOPROT1 IGA SER QL: <9 SAU
B2 GLYCOPROT1 IGG SER QL: <9 SGU
B2 GLYCOPROT1 IGM SER QL: <9 SMU

## 2017-06-06 NOTE — TELEPHONE ENCOUNTER
----- Message from Robin Mcclelland RN sent at 6/6/2017  1:07 PM CDT -----  Hey everyone,     I was wondering if you could schedule Mr. Nunez a follow up appointment in 4-6 weeks with a stroke provider.     Thanks so much for all you do for myself and our stroke patients,  JEY HoffmanN-RN

## 2017-06-06 NOTE — PATIENT INSTRUCTIONS
Discharge Instructions for Stroke  You have been diagnosed with a stroke, or with a TIA (transient ischemic attack). Or you have been identified as having a high risk for stroke. During a stroke, blood stops flowing to part of your brain. This can damage areas in the brain that control other parts of the body. Symptoms after a stroke depend on which part of the brain has been affected.  Stroke risk factors  Once youve had a stroke, youre at greater risk for another one. Listed below are some other factors that can increase your risk for a stroke:  · High blood pressure  · High cholesterol  · Cigarette or cigar smoking  · Diabetes  · Carotid or other artery disease  · Atrial fibrillation, atrial flutter, or other heart disease  · Not being physically active  · Obesity  · Certain blood disorders (such as sickle cell anemia)  · Excessive alcohol use  · Abuse of street drugs  · Race  · Gender  · Family history of stroke  · Diet high in salty, fried, or greasy foods  Changes in daily living  Doing your regular tasks may be difficult after youve had a stroke, but you can learn new ways to manage your daily activities. In fact, doing daily activities may help you to regain muscle strength and bring back function to affected limbs. Be patient, give yourself time to adjust, and appreciate the progress you make.  Daily activities  You may be at risk of falling. Make changes to your home to help you walk more easily. A therapist will decide if you need an assistive device to walk safely.  You may need to see an occupational therapist or physical therapist to learn new ways of doing things. For example, you may need to make adjustments when bathing or dressing:  Tips for showering or bathing  · Test the water temperature with a hand or foot that was not affected by the stroke.  · Use grab bars, a shower seat, a hand-held showerhead, and a long-handled brush.  Tips for getting dressed  · Dress while sitting, starting with  the affected side or limb.  · Wear shirts that pull easily over your head. Wear pants or skirts with elastic waistbands.  · Use zippers with loops attached to the pull tabs.  Lifestyle changes  · Take your medicines exactly as directed. Dont skip doses.  · Begin an exercise program. Ask your provider how to get started. Also ask how much activity you should try to get on a daily or weekly basis. You can benefit from simple activities such as walking or gardening.  · Limit alcohol intake. Men should have no more than 2 alcoholic drinks a day. Women should limit themselves to 1 alcoholic drink per day.  · Know your cholesterol level. Follow your providers recommendations about how to keep cholesterol under control.  · If you are a smoker, quit now. Join a stop-smoking program to improve your chances of success. Ask your provider about medicines or other methods to help you quit.  · Learn stress management techniques to help you deal with stress in your home and work life.  Diet  Your healthcare provider will give you information on dietary changes that you may need to make, based on your situation. Your provider may recommend that you see a registered dietitian for help with diet changes. Changes may include:  · Reducing fat and cholesterol intake  · Reducing salt (sodium) intake, especially if you have high blood pressure  · Eating more fresh vegetables and fruits  · Eating more lean proteins, such as fish, poultry, and beans and peas (legumes)  · Eating less red meat and processed meats  · Using low-fat dairy products  · Limiting vegetable oils and nut oils  · Limiting sweets and processed foods such as chips, cookies, and baked goods  Follow-up care  · Keep your medical appointments. Close follow-up is important to stroke rehabilitation and recovery.  · Some medicines require blood tests to check for progress or problems. Keep follow-up appointments for any blood tests ordered by your providers.  When to call  911  Call 911 right away if you have any of the following symptoms of stroke:  · Weakness, tingling, or loss of feeling on one side of your face or body  · Sudden double vision or trouble seeing in one or both eyes  · Sudden trouble talking or slurred speech  · Trouble understanding others  · Sudden, severe headache  · Dizziness, loss of balance, or a sense of falling  · Blackouts or seizures      F.A.S.T. is an easy way to remember the signs of stroke. When you see these signs, you know that you need to call 911 fast.  F.A.S.T. stands for:  · F is for face drooping. One side of the face is drooping or numb. When the person smiles, the smile is uneven.  · A is for arm weakness. One arm is weak or numb. When the person lifts both arms at the same time, one arm may drift downward.  · S is for speech difficulty. You may notice slurred speech or trouble speaking. The person can't repeat a simple sentence correctly when asked.  · T is for time to call 911. If someone shows any of these symptoms, even if they go away, call 911 immediately. Make note of the time the symptoms first appeared.  Date Last Reviewed: 8/26/2015 © 2000-2016 [employer]. [address] [place] [address], [place] [address]. All rights reserved. This information is not intended as a substitute for professional medical care. Always follow your healthcare professional's instructions.

## 2017-06-07 ENCOUNTER — LAB VISIT (OUTPATIENT)
Dept: LAB | Facility: HOSPITAL | Age: 57
End: 2017-06-07
Attending: INTERNAL MEDICINE
Payer: MEDICAID

## 2017-06-07 ENCOUNTER — OFFICE VISIT (OUTPATIENT)
Dept: FAMILY MEDICINE | Facility: CLINIC | Age: 57
End: 2017-06-07
Payer: MEDICAID

## 2017-06-07 VITALS
HEIGHT: 70 IN | SYSTOLIC BLOOD PRESSURE: 98 MMHG | TEMPERATURE: 98 F | BODY MASS INDEX: 29.35 KG/M2 | DIASTOLIC BLOOD PRESSURE: 58 MMHG | OXYGEN SATURATION: 98 % | WEIGHT: 205 LBS | HEART RATE: 64 BPM

## 2017-06-07 DIAGNOSIS — G93.40 ACUTE ENCEPHALOPATHY: ICD-10-CM

## 2017-06-07 DIAGNOSIS — K92.2 UPPER GI BLEED: ICD-10-CM

## 2017-06-07 DIAGNOSIS — Z79.4 TYPE 2 DIABETES MELLITUS WITH DIABETIC POLYNEUROPATHY, WITH LONG-TERM CURRENT USE OF INSULIN: Primary | ICD-10-CM

## 2017-06-07 DIAGNOSIS — I77.6 CNS VASCULITIS: ICD-10-CM

## 2017-06-07 DIAGNOSIS — E11.42 TYPE 2 DIABETES MELLITUS WITH DIABETIC POLYNEUROPATHY, WITH LONG-TERM CURRENT USE OF INSULIN: Primary | ICD-10-CM

## 2017-06-07 LAB
ALBUMIN SERPL BCP-MCNC: 3.2 G/DL
ALP SERPL-CCNC: 78 U/L
ALT SERPL W/O P-5'-P-CCNC: 24 U/L
ANION GAP SERPL CALC-SCNC: 12 MMOL/L
AST SERPL-CCNC: 34 U/L
BASOPHILS # BLD AUTO: 0.01 K/UL
BASOPHILS NFR BLD: 0.2 %
BILIRUB SERPL-MCNC: 1 MG/DL
BUN SERPL-MCNC: 23 MG/DL
CALCIUM SERPL-MCNC: 9.4 MG/DL
CHLORIDE SERPL-SCNC: 103 MMOL/L
CO2 SERPL-SCNC: 23 MMOL/L
CREAT SERPL-MCNC: 1.1 MG/DL
DIFFERENTIAL METHOD: ABNORMAL
EOSINOPHIL # BLD AUTO: 0 K/UL
EOSINOPHIL NFR BLD: 0 %
ERYTHROCYTE [DISTWIDTH] IN BLOOD BY AUTOMATED COUNT: 15.1 %
EST. GFR  (AFRICAN AMERICAN): >60 ML/MIN/1.73 M^2
EST. GFR  (NON AFRICAN AMERICAN): >60 ML/MIN/1.73 M^2
GLUCOSE SERPL-MCNC: 286 MG/DL
HCT VFR BLD AUTO: 29.7 %
HGB BLD-MCNC: 10.3 G/DL
LYMPHOCYTES # BLD AUTO: 0.7 K/UL
LYMPHOCYTES NFR BLD: 11.8 %
MCH RBC QN AUTO: 29.8 PG
MCHC RBC AUTO-ENTMCNC: 34.7 %
MCV RBC AUTO: 86 FL
MONOCYTES # BLD AUTO: 0.2 K/UL
MONOCYTES NFR BLD: 3.1 %
NEUTROPHILS # BLD AUTO: 5.2 K/UL
NEUTROPHILS NFR BLD: 84.6 %
PLATELET # BLD AUTO: 297 K/UL
PMV BLD AUTO: 8.8 FL
POTASSIUM SERPL-SCNC: 4 MMOL/L
PROT SERPL-MCNC: 6.5 G/DL
RBC # BLD AUTO: 3.46 M/UL
SODIUM SERPL-SCNC: 138 MMOL/L
VIT B1 SERPL-MCNC: 72 UG/L (ref 38–122)
WBC # BLD AUTO: 6.11 K/UL

## 2017-06-07 PROCEDURE — 80053 COMPREHEN METABOLIC PANEL: CPT

## 2017-06-07 PROCEDURE — 99999 PR PBB SHADOW E&M-EST. PATIENT-LVL V: CPT | Mod: PBBFAC,,, | Performed by: INTERNAL MEDICINE

## 2017-06-07 PROCEDURE — 85025 COMPLETE CBC W/AUTO DIFF WBC: CPT

## 2017-06-07 PROCEDURE — 36415 COLL VENOUS BLD VENIPUNCTURE: CPT | Mod: PO

## 2017-06-07 RX ORDER — INSULIN DEGLUDEC 200 U/ML
15 INJECTION, SOLUTION SUBCUTANEOUS NIGHTLY
Qty: 3 SYRINGE | Refills: 1
Start: 2017-06-07 | End: 2017-09-14 | Stop reason: ALTCHOICE

## 2017-06-07 RX ORDER — BENZPHETAMINE HYDROCHLORIDE 50 MG/1
1 TABLET ORAL 3 TIMES DAILY
Refills: 0 | COMMUNITY
Start: 2017-05-02 | End: 2017-06-25

## 2017-06-07 NOTE — PROGRESS NOTES
Transitional Care Note  Subjective:       Patient ID: Bessy Nunez is a 57 y.o. male.  Chief Complaint: Transitional Care    Family and/or Caretaker present at visit?  Yes.  Diagnostic tests reviewed/disposition: I have reviewed all completed as well as pending diagnostic tests at the time of discharge.  Disease/illness education: discussed discharge diagnoses with pt/family.  Home health/community services discussion/referrals: Patient does not have home health established from hospital visit.  They do need home health.  If needed, we will set up home health for the patient.   Establishment or re-establishment of referral orders for community resources: No other necessary community resources.   Discussion with other health care providers: will arrange follow up with GI on the [facility] and neuro on the main campus.     I previously saw him mid-March.  Diabetes, with peripheral neuropathy, followed by DM NP.  Hypertension, followed by cardiology. Stress echo negative for ischemia but showed low normal EF.    5/11/2016 TTE:   1 - Mildly depressed left ventricular systolic function (EF 45-50%).  Mild global hypokinesis at rest. 2 - Eccentric hypertrophy. 3 - Left ventricular diastolic dysfunction.  Hyperlipidemia, atorvastatin  5/11/2015 RUQ US: Hepatomegaly and fatty infiltration of the liver. Stable small hypoechoic lesion in the left hepatic lobe. Splenomegaly, stable. Cholelithiasis  6/12/2015 for biopsy showing advanced stage fibrosis with bridging fibrosis and scattered nodules.  2/21/2014 colonoscopy showing mild nonspecific acute and chronic inflammation of the ascending colon.  Hyperplastic polyp. Repeat 3 years.  Depression, Zoloft; last visit increased the dose.  Likely sleep apnea likely but never dx'd.   Last OV with me c/o numbness to the face, I ordered head CTA which was negative.  Since his last visit with me, internuclear ophthalmoplegia.  He underwent MRI brain and MRI neck, which showed no stroke but  concerning periventricular white matter findings consistent with demyelinating process.  MRI of the spine was performed showing no spinal cord lesions.  Incidental right renal lesion found on MRI of the spine.  Underwent renal ultrasound which showed stable bilateral simple and minimally, complicated renal cysts.  Incidental right nonobstructive nephrolithiasis.  Was discharged on Solu-Medrol.  Subsequent readmission after a fall with nonverbal.  EEG was performed negative for seizures.  Repeat MRI showing new lesion, question whether this was demyelination versus CVA.  Question whether cardioembolic phenomenon.  He had a drop in his hemoglobin, subsequent EGD showing duodenal and gastric ulcer which were treated with cautery.  GIOVANNI was planned but postponed given the multiple ulcers.  Neurology was concerned that this might represent CNS vasculitis and was started on Cytoxan     Reviewed hospital progress notes.  Rheumatology saw him in consult, did not feel presentation was consistent with rheumatologic disorder.  He is here accompanied by his wife.  Notes that they were discharged without clear plan for follow-up.  Does not have a scheduled follow-up with neurology or GI.  Patient has not seen blood in the stool grossly.  He feels okay though his wife does not agree.  She notes that he still gets confused.  Has to be reoriented in the mornings.  Notes that his blood sugar is running in the 200-300 range.  Lowest is 180.  Initially denied that he was taking Tresiba but on re-discussion he is taking it.  He thinks he is taking 12 units a day but his memory is suspect.  Initially he did not feel it necessary to get home health but given his issues with memory and that his wife is unavailable to closely monitor his sugars, I'm going to order home health for skilled nursing to follow along.    His blood pressure is low today.  Asymptomatic.  Taking the Diovan, diltiazem, and atenolol.    Notes decreased interest in  "things.  Just sits around all day.  He was not getting the Zoloft 150 while hospitalized she thinks.  Has resumed it on discharge.    MMSE not done 100% but 1/3 object recall after 5 min.  Able to follow 3 step command, written instruction, repeat sentence, spell WORLD backwards.  Alert and oriented to time place and person.      MMSE 6/7/2017   What is the (year), (season), (date), (day), (month)? 5   Where are we (state), (country), (town or city), (hospital), (floor)? 5   Name 3 common objects (eg. "apple", "table", "jojo"). Take 1 second to say each. Then ask the patient to repeat all 3. Give 1 point for each correct answer. Then repeat them until he/she learns all 3. Count trials and record. 3   Serial 7's backwards. Stop after 5 answers. (100,93,86,79,72) or alternatively  spell "WORLD" backwards. (D..L..R..O..W). The score is the number of letters in correct order. 5   Ask for the 3 common objects named earlier in the exam. Give 1 point for each correct answer. 1   Name a "pencil" and "watch." 2   Repeat the following: "No ifs, ands, or buts." 1   Follow a 3-stage command: "Take a paper in your right hand, fold it in half, & put it on the floor." 3   Read and obey the following: (see paper exam) 1       Review of Systems   Constitutional: Negative for chills, diaphoresis, fever and unexpected weight change.   Respiratory: Negative for shortness of breath.    Cardiovascular: Negative for chest pain and leg swelling.   Gastrointestinal: Negative for abdominal distention, abdominal pain, anal bleeding, blood in stool and constipation.   Neurological: Positive for speech difficulty. Negative for syncope and headaches.   Psychiatric/Behavioral: Positive for behavioral problems and confusion. Negative for agitation.       Objective:      Physical Exam   Constitutional: He is oriented to person, place, and time. He appears well-developed and well-nourished. No distress.   Eyes: EOM are normal.   Cardiovascular: " Normal rate, regular rhythm and normal heart sounds.    No murmur heard.  Pulmonary/Chest: Effort normal and breath sounds normal.   Musculoskeletal: Normal range of motion.   Neurological: He is alert and oriented to person, place, and time. Coordination normal.   Cranial nerves II through XII intact.   5/5 bilaterally.  Finger-nose intact bilaterally.  Romberg negative, pronator drift negative.  No diplopia visualized.   Skin: Skin is warm and dry.   Psychiatric: He has a normal mood and affect. His behavior is normal. Thought content normal.   Flattened affect.  Appropriate response to questions.  A little bit of psychomotor slowing.       Assessment:       1. Type 2 diabetes mellitus with diabetic polyneuropathy, with long-term current use of insulin    2. Acute encephalopathy    3. CNS vasculitis    4. Upper GI bleed        Plan:       Referral for home health, PT, OT, speech therapy.  Skilled nursing for glucose monitoring.  His poor memory asymmetric risk for hyperglycemic and hypoglycemic episodes.  On Victoza, metformin, on Tresiba 12 units he thinks.  Increase the dose to 15 units.  Referral to neurology at [facility]  Referral to GI [facility] as it is more convenient for him    Follow up 1 month.  Referrals in the interim and home health in the interim.    Type 2 diabetes mellitus with diabetic polyneuropathy, with long-term current use of insulin  -     Ambulatory referral to Home Health    Acute encephalopathy  -     Ambulatory referral to Home Health  -     Comprehensive metabolic panel; Future; Expected date: 06/07/2017    CNS vasculitis  -     Ambulatory referral to Home Health  -     Ambulatory referral to Neurology    Upper GI bleed  -     Ambulatory referral to Gastroenterology  -     Ambulatory referral to Home Health  -     CBC auto differential; Future; Expected date: 06/07/2017  -     Comprehensive metabolic panel; Future; Expected date: 06/07/2017    Other orders  -     insulin  degludec (TRESIBA FLEXTOUCH U-200) 200 unit/mL (3 mL) InPn; Inject 15 Units into the skin every evening.  Dispense: 3 Syringe; Refill: 1

## 2017-06-07 NOTE — PROGRESS NOTES
Assessment & Plan  There are no diagnoses linked to this encounter.    There are no discontinued medications.    Follow-up: No Follow-up on file.      =================================================================      No chief complaint on file.      CHRISTAL Nunez is a 57 y.o. male, last appointment with this clinic was 3/22/2017.    I previously saw him mid-March.  Diabetes, with peripheral neuropathy, followed by DM NP.  Hypertension, followed by cardiology. Stress echo negative for ischemia but showed low normal EF.    5/11/2016 TTE:   1 - Mildly depressed left ventricular systolic function (EF 45-50%).  Mild global hypokinesis at rest. 2 - Eccentric hypertrophy. 3 - Left ventricular diastolic dysfunction.  Hyperlipidemia, atorvastatin  5/11/2015 RUQ US: Hepatomegaly and fatty infiltration of the liver. Stable small hypoechoic lesion in the left hepatic lobe. Splenomegaly, stable. Cholelithiasis  6/12/2015 for biopsy showing advanced stage fibrosis with bridging fibrosis and scattered nodules.  2/21/2014 colonoscopy showing mild nonspecific acute and chronic inflammation of the ascending colon.  Hyperplastic polyp. Repeat 3 years.  Depression, Zoloft; last visit increased the dose.  Likely sleep apnea likely but never dx'd.   Last OV with me c/o numbness to the face, I ordered head CTA which was negative.  Since his last visit with me, internuclear ophthalmoplegia.  He underwent MRI brain and MRI neck, which showed no stroke but concerning periventricular white matter findings consistent with demyelinating process.  MRI of the spine was performed showing no spinal cord lesions.  Incidental right renal lesion found on MRI of the spine.  Underwent renal ultrasound which showed stable bilateral simple and minimally, complicated renal cysts.  Incidental right nonobstructive nephrolithiasis.  Was discharged on Solu-Medrol.  Subsequent readmission after a fall with nonverbal.  EEG was performed negative for seizures.   Repeat MRI showing new lesion, question whether this was demyelination versus CVA.  Question whether cardioembolic phenomenon.  He had a drop in his hemoglobin, subsequent EGD showing duodenal and gastric ulcer which were treated with cautery.  GIOVANNI was planned but postponed given the multiple ulcers.  Neurology was concerned that this might represent CNS vasculitis and was started on Cytoxan    Patient Care Team:  Edgar Nova MD as PCP - General (Internal Medicine)  Promise Steele NP as Nurse Practitioner (Endocrinology)    Patient Active Problem List    Diagnosis Date Noted    Fall 06/04/2017    Hypomagnesemia 06/04/2017    Hypokalemia 06/04/2017    Urinary incontinence 06/04/2017    Multiple sclerosis 06/02/2017    CNS vasculitis 06/02/2017    Acute blood loss anemia 05/28/2017    Upper GI bleed 05/28/2017    Encephalopathy 05/26/2017    Acute encephalopathy     Leukocytosis 05/24/2017    Diplopia 05/24/2017    Syncope 05/23/2017    Type 2 diabetes mellitus with diabetic polyneuropathy, with long-term current use of insulin 05/14/2017    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 5/13: MRA brain/neck, MRI brain w/wo contrast show no vascular occlusion, multiple foci of hyperintensity in deep cerebral periventricular white matter in pattern of demyelinating process.  5/17: MRI spine performed, no spinal cord lesions.      Amblyopia 05/13/2017    Demyelinating changes in brain 05/13/2017     By mri.  Several active lesions, many old.  5/13: MRA brain/neck, MRI brain w/wo contrast show no vascular occlusion, multiple foci of hyperintensity in deep cerebral periventricular white matter in pattern of demyelinating process.   5/17: MRI spine performed, no spinal cord lesions.      Ataxia 05/13/2017    Left facial numbness 03/22/2017     3/22/2017 CT angio head and neck: Intracranial and neck vessels are patent and symmetric. No acute process seen. Small vessel ischemic change and  remote white matter infarcts.      Hx of colonic polyp 01/30/2015 2/21/2014 colonoscopy showing mild nonspecific acute and chronic inflammation of the ascending colon.  Hyperplastic polyp. Repeat 3 years.      Lipodystrophy 10/11/2013    Type 2 diabetes mellitus with hyperglycemia, with long-term current use of insulin 10/11/2013    NAFLD (nonalcoholic fatty liver disease) 10/11/2013    Obesity 10/11/2013    ED (erectile dysfunction) 10/11/2013    Sleep apnea 10/11/2013    Depressive disorder, not elsewhere classified 11/09/2012    Mixed hyperlipidemia 11/09/2012    Essential hypertension 11/09/2012       PAST MEDICAL HISTORY:  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    Mixed hyperlipidemia 11/9/2012    NAFLD (nonalcoholic fatty liver disease) 10/11/2013    CHASE (nonalcoholic steatohepatitis)     Obesity     Stroke     Type 2 diabetes mellitus with diabetic polyneuropathy, with long-term current use of insulin 5/14/2017       PAST SURGICAL HISTORY:  Past Surgical History:   Procedure Laterality Date    SKIN CANCER EXCISION         SOCIAL HISTORY:  Social History     Social History    Marital status:      Spouse name: N/A    Number of children: N/A    Years of education: N/A     Occupational History    unemployed      Social History Main Topics    Smoking status: Never Smoker    Smokeless tobacco: Never Used    Alcohol use No    Drug use: No    Sexual activity: Not on file     Other Topics Concern    Not on file     Social History Narrative    No narrative on file       ALLERGIES AND MEDICATIONS: updated and reviewed.  Review of patient's allergies indicates:  No Known Allergies  Current Outpatient Prescriptions   Medication Sig Dispense Refill    aspirin (ECOTRIN) 81 MG EC tablet Take 81 mg by mouth once daily.      atenolol (TENORMIN) 50 MG tablet Take 1 tablet (50 mg total) by mouth  "once daily. 90 tablet 3    atorvastatin (LIPITOR) 40 MG tablet Take 1 tablet (40 mg total) by mouth once daily. 90 tablet 3    benzphetamine 50 mg Tab Take 1 tablet by mouth 3 (three) times daily.  0    diltiaZEM (DILACOR XR) 240 MG CDCR Take 1 capsule (240 mg total) by mouth once daily. 90 capsule 3    insulin degludec (TRESIBA FLEXTOUCH U-200) 200 unit/mL (3 mL) InPn Inject 42 Units into the skin every evening. 3 Syringe 1    insulin needles, disposable, (BD ULTRA-FINE ANA PEN NEEDLES) 32 x 5/32 " Ndle Inject twice daily 100 each 3    levetiracetam (KEPPRA) 500 MG Tab Take 1 tablet (500 mg total) by mouth 2 (two) times daily. 60 tablet 2    liraglutide 0.6 mg/0.1 mL, 18 mg/3 mL, subq PNIJ (VICTOZA 3-TOMASZ) 0.6 mg/0.1 mL (18 mg/3 mL) PnIj Inject 1.8 mg into the skin once daily. 9 mL 11    metformin (GLUCOPHAGE-XR) 500 MG 24 hr tablet Take 2 tablets (1,000 mg total) by mouth 2 (two) times daily with meals. 360 tablet 0    omega-3 fatty acids-vitamin E (FISH OIL) 1,000 mg Cap Take 1 capsule by mouth 2 (two) times daily.  0    predniSONE (DELTASONE) 20 MG tablet Take 3 tablets (60 mg total) by mouth once daily. 30 tablet 1    ranitidine (ZANTAC) 150 MG tablet Take 1 tablet (150 mg total) by mouth 2 (two) times daily. 60 tablet 11    sertraline (ZOLOFT) 100 MG tablet 1 and half tablet daily (Patient taking differently: Take 150 mg by mouth once daily. ) 135 tablet 3    valsartan (DIOVAN) 320 MG tablet Take 1 tablet (320 mg total) by mouth once daily. 90 tablet 3    vitamin D 1000 units Tab Take 5 tablets (5,000 Units total) by mouth once daily.       No current facility-administered medications for this visit.        ROS    Physical Exam ***  There were no vitals filed for this visit. There is no height or weight on file to calculate BMI.            Physical Exam  "

## 2017-06-07 NOTE — TELEPHONE ENCOUNTER
Never received response from [facility] or [facility] re: bedside commode via email. Left voicemail for [facility] [phone]  Sent high priority message to Dr. Derrick Agudelo's staff to see if they can send order to [facility] for bedside commode since I can't get in touch with [facility] or [facility]. Called patient to give update. No answer. Left message.  [phone] Spoke with Liliane Daugherty CM. States will resend order to [facility] [place].

## 2017-06-07 NOTE — TREATMENT PLAN
Occupational Therapy Discharge Summary    Bessy Nuenz  MRN: [id number]   CNS vasculitis   Patient Discharged from acute Occupational Therapy on 6/4/17.  Please refer to prior OT note dated on 6/1/17 for functional status.     Assessment:   Patient was discharge unexpectedly.  Information required to complete and accurate discharge summary is unknown.  Refer to therapy initial evaluation and last progress note for initial and most recent functional status and goal achievement.  Recommendations made may be found in medical record.  GOALS:    Occupational Therapy Goals        Problem: Occupational Therapy Goal    Goal Priority Disciplines Outcome Interventions   Occupational Therapy Goal     OT, PT/OT Ongoing (interventions implemented as appropriate)    Description:  Goals to be met by: 06/10/17     Patient will increase functional independence with ADLs by performing:    Feeding with Modified [place].  UE Dressing with Supervision  Dressing with SBA.  Grooming while seated with Set-up Assistance.  Toileting from bedside commode with Min Assistance for hygiene and clothing management.   Supine to sit with Supervision  Toilet transfer to bedside commode with SBA with RW.  Upper extremity exercise program x12 reps per handout, with supervision.                     Reasons for Discontinuation of Therapy Services  Transfer to alternate level of care.      Plan:  Patient Discharged to: Home with Home Health Service.

## 2017-06-08 ENCOUNTER — TELEPHONE (OUTPATIENT)
Dept: FAMILY MEDICINE | Facility: CLINIC | Age: 57
End: 2017-06-08

## 2017-06-08 NOTE — PROGRESS NOTES
Blood sugar was high.  Reportedly on tresiba 12 units.  Instructed at OV to increase to 15 units.  CBC hb stable.  Results to pt.  HH referred at OV for glc monitoring

## 2017-06-12 LAB — CRYOGLOB SER QL: NORMAL

## 2017-06-14 ENCOUNTER — CLINICAL SUPPORT (OUTPATIENT)
Dept: REHABILITATION | Facility: HOSPITAL | Age: 57
End: 2017-06-14
Attending: INTERNAL MEDICINE
Payer: MEDICAID

## 2017-06-14 DIAGNOSIS — Z74.09 IMPAIRED FUNCTIONAL MOBILITY, BALANCE, GAIT, AND ENDURANCE: ICD-10-CM

## 2017-06-14 DIAGNOSIS — Z91.81 HISTORY OF RECENT FALL: ICD-10-CM

## 2017-06-14 PROCEDURE — G8978 MOBILITY CURRENT STATUS: HCPCS | Mod: CL,PO

## 2017-06-14 PROCEDURE — G8979 MOBILITY GOAL STATUS: HCPCS | Mod: CJ,PO

## 2017-06-14 PROCEDURE — 97161 PT EVAL LOW COMPLEX 20 MIN: CPT | Mod: PO

## 2017-06-14 NOTE — PLAN OF CARE
OUTPATIENT NEUROLOGICAL REHABILITATION  PHYSICAL THERAPY EVALUATION    Name: Bessy Nunez  Clinic Number: [id number]    Diagnosis:   Encounter Diagnoses   Name Primary?   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 male that presents to [facility] secondary to decreased balance and decreased safety with functional mobility 2/2 medical diagnosis of CNS vasculitis. Pt poor historian and family not present during evaluation. Per medical chart, pt has had multiple recent hospitalizations over the past few months due to changes in cognition and balance. Pt was most recently hospitalized on 5/23/17 for changes in vision and unsteady gait following a fall at home. Hospital testing negative for MS or seizure.     Chief complaints:  1. Decreased balance  2. Decreased endurance  3. Decreased cognition- decreased attention, delayed response, delayed initiation, and flat affect    Falls in the past year: 2; 1 occurred a couple of weeks ago; pt reports that he lost his balance in the yard  Prior Therapy: PT/OT/SLP in acute at hospital  Nutrition:  Overweight  Social History/Support systems: wife  Place of Residence (Steps/Adaptations/Levels): 2[place]; bed/bath downstairs; 1  "flight of stairs; RHR; 0 AMANDA, tub no seat  Previous functional status includes: (I) with all functional mobility/ADL and IADL  Current functional status:  Mod I to (I) with all functional mobility/ADL, (A) c/ IADL  Exercise routine prior to onset : BLE exercises per HEP  DME owned: none  Work/Job description includes:  Working prior to CVA  for [employer]; not working at the moment      Subjective   Pt stated goals: "Get back to where I was."  Family present/states: NA  Pain: 0/10 pain    Objective   - Follows commands: 100% of time   - Speech: no deficits     Mental status: alert, oriented to person, place, and time, cooperative, calm, flat affect  Appearance: Casually dressed  Behavior:  calm, cooperative and delayed response, delayed initiation  Attention Span and Concentration:  Easily distracted    Dominant hand:  right     Posture Alignment: forward head, rounded shoulders    Sensation: Light Touch: Intact         Tone: 0 - No increase in muscle tone     Visual/Auditory: pt reports occasional blurriness but unable to specify further   Per medical record: pt with diploplia  Tracking:Intact  Saccades: Intact  R/L discrimination: Intact  Visual field: Intact    Coordination:     - LE coordination:  Heel/toe taps simultaneous, alternating, fast and slow intact    ROM:   UPPER EXTREMITY--AROM/PROM  (R) UE: WFLs  (L) UE: WFLs           RANGE OF MOTION--LOWER EXTREMITIES  (R) LE Hip: normal   Knee: normal   Ankle: normal    (L) LE: Hip: normal   Knee: normal   Ankle: normal      Lower Extremity Strength  Right LE  Left LE    Hip Flexion: 5/5 Hip Flexion: 5/5   Hip Extension:  5/5 Hip Extension: 5/5   Hip Abduction: 5/5 Hip Abduction: 5/5   Hip Adduction: 5/5 Hip Adduction 5/5   Knee Extension: 5/5 Knee Extension: 5/5   Knee Flexion: 5/5 Knee Flexion: 5/5   Ankle Dorsiflexion: 5/5 Ankle Dorsiflexion: 5/5   Ankle Plantarflexion: 5/5 Ankle Plantarflexion: 5/5        Evaluation   Single Limb " Stance R LE 2 sec  (<10 sec = HIGH FALL RISK)   Single Limb Stance L LE 4 sec  (<10 sec = HIGH FALL RISK)   30 second Chair Rise 5 completed with no arms     Postural control:  MCTSIB:  1. Eyes Open/feet together/Firm: 30 seconds  2. Eyes Closed/feet together/Firm: 30 seconds  3. Eyes Open/feet together/Foam: 30 seconds  4. Eyes Closed/feet together/Foam: 11 seconds    Gait Assessment:   - AD used: none  - Assistance: Mod I  - Distance: x 200'    GAIT DEVIATIONS:  Bessy displays the following deviations with ambulation: decreased B toe/floor clearance, dec B heel strike at IC, decreased B hip extension    Impairments contributing to deviations: impaired motor control, decreased balance, decreased attention    Endurance Deficit:       Evaluation   Self Selected Walking Speed 0.55 m/sec (6m/11s)   Fast Walking Speed 0.67 m/sec (6m/9s)     Functional Gait Assessment:   1. Gait on level surface =  1  2. Change in Gait Speed = 2  3. Gait with horizontal head turns  = 2  4. Gait with vertical head turns = 2  5. Gait with pivot turns = 3  6. Step over obstacle = 1  7. Gait with Narrow JOAQUIN = 2  8. Gait with eyes closed = 2  9. Ambulating Backwards = 2  10. Steps = 1    Score 18/30     Functional Mobility (Bed mobility, transfers)  Bed mobility: I  Supine to sit: I  Sit to supine: I  Transfers to bed: I  Transfers to toilet: I  Sit to stand:  I  Stand pivot:  I  Car transfers: I  Wheelchair mobility: NA    ADL's:  Feeding: I  Grooming: I  Hygiene: I  UB Dressing: I  LB Dressing: I  Toileting: I  Bathing: I    Functional Limitations Reports - G Codes  Category: Mobility  Tool: FOTO  Score: Status: 34%, Limitation: 66%  Current: CL at least 60% < 80% impaired, limited or restricted  Goal: CJ at least 20% < 40% impaired, limited or restricted    Education provided re:role of PT, goals for PT, scheduling - pt verbalized understanding. Discussed insurance limitations with pt.     Bessy verbalized good understanding of education  provided.   Pt has no cultural, educational or language barriers to learning provided.      Assessment   This is a 57 y.o. male referred to outpatient physical therapy and presents with a medical diagnosis of CNS vasculitis.  Patient presents with decreased balance, impaired gait, decreased cognition, decreased endurance, and recent falls. PT is warranted to improve mobility deficits listed above, improve safety with functional transfers, ADL, and ambulation, and decrease fall risk.      PT/PTA met face to face to discuss pt's treatment plan and established goals. Pt will be seen by physical therapy every 6th visit and minimally once per month.    Pt rehab potential is Excellent. Pt will benefit from continuing skilled outpatient physical therapy to address the deficits listed below in the problem list, provide pt/family education and to maximize pt's level of independence in the home and community environment.     History  Co-morbidities and personal factors that may impact the plan of care Examination  Body Structures and Functions, activity limitations and participation restrictions that may impact the plan of care. Medical necessity is demonstrated by the following impairments. Clinical Presentation Decision Making/ Complexity Score   1. Decreased cognition  2. History of recent falls 1. Decreased balance  2. Decreased endurance  3. Decreased attention stable    moderate high low low       Pt's spiritual, cultural and educational needs considered and pt agreeable to plan of care and goals as stated below:     GOALS:   Short term goals: 4 weeks, pt agrees to goals set.  4. Pt will be (I) with established HEP.   5. Pt will improve LLE SLS to at least 6 seconds to improve balance and decrease risk of fall.   6. Pt will improve RLE SLS to at least 4 seconds to improve balance and decrease risk of fall.   7. Pt will improve 30 second chair rise score to at least 6 times to improve safety and endurance with functional  mobility.   8. Pt will improve SSWS to at least 0.70 m/s to improve safety with community ambulation and to decrease fall risk.   9. Pt will improve FGA score to at least 22/30 to improve safety with functional mobility and decrease risk of fall.     Long term goals: 8 weeks, pt agrees to goals set  10. Pt will improve LLE SLS to at least 8 seconds to improve balance and decrease risk of fall.   11. Pt will improve RLE SLS to at least 6 seconds to improve balance and decrease risk of fall.   12. Pt will improve 30 second chair rise score to at least 7 times to improve safety and endurance with functional mobility.   13. Pt will improve SSWS to at least 0.90 m/s to improve safety with community ambulation and to decrease fall risk.   14. Pt will improve FGA score to at least 25/30 to improve safety with functional mobility and decrease risk of fall.   15. Pt will improve FOTO score to at least status 66% (limitation 34%) to improve self perception of functional mobility.       Plan   Outpatient physical therapy 2 times weekly to include: Pt Education, HEP, therapeutic exercises, neuromuscular re-education, therapeutic activities, manual therapy, joint mobilizations, aquatic therapy and modalities PRN to achieve established goals. Pt may be seen by PTA as part of the rehabilitation team.     Cont PT for  8 weeks.     Meghan Tian, PT  06/14/2017

## 2017-06-14 NOTE — PROGRESS NOTES
OUTPATIENT NEUROLOGICAL REHABILITATION  PHYSICAL THERAPY EVALUATION    Name: Bessy Nunez  Clinic Number: [id number]    Diagnosis:   Encounter Diagnoses   Name Primary?   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 male that presents to [facility] secondary to decreased balance and decreased safety with functional mobility 2/2 medical diagnosis of CNS vasculitis. Pt poor historian and family not present during evaluation. Per medical chart, pt has had multiple recent hospitalizations over the past few months due to changes in cognition and balance. Pt was most recently hospitalized on 5/23/17 for changes in vision and unsteady gait following a fall at home. Hospital testing negative for MS or seizure.     Chief complaints:  1. Decreased balance  2. Decreased endurance  3. Decreased cognition- decreased attention, delayed response, delayed initiation, and flat affect    Falls in the past year: 2; 1 occurred a couple of weeks ago; pt reports that he lost his balance in the yard  Prior Therapy: PT/OT/SLP in acute at hospital  Nutrition:  Overweight  Social History/Support systems: wife  Place of Residence (Steps/Adaptations/Levels): 2SH; bed/bath downstairs; 1  "flight of stairs; RHR; 0 AMANDA, tub no seat  Previous functional status includes: (I) with all functional mobility/ADL and IADL  Current functional status:  Mod I to (I) with all functional mobility/ADL, (A) c/ IADL  Exercise routine prior to onset : BLE exercises per HEP  DME owned: none  Work/Job description includes:  Working prior to CVA  for [employer]; not working at the moment      Subjective   Pt stated goals: "Get back to where I was."  Family present/states: NA  Pain: 0/10 pain    Objective   - Follows commands: 100% of time   - Speech: no deficits     Mental status: alert, oriented to person, place, and time, cooperative, calm, flat affect  Appearance: Casually dressed  Behavior:  calm, cooperative and delayed response, delayed initiation  Attention Span and Concentration:  Easily distracted    Dominant hand:  right     Posture Alignment: forward head, rounded shoulders    Sensation: Light Touch: Intact         Tone: 0 - No increase in muscle tone     Visual/Auditory: pt reports occasional blurriness but unable to specify further   Per medical record: pt with diploplia  Tracking:Intact  Saccades: Intact  R/L discrimination: Intact  Visual field: Intact    Coordination:     - LE coordination:  Heel/toe taps simultaneous, alternating, fast and slow intact    ROM:   UPPER EXTREMITY--AROM/PROM  (R) UE: WFLs  (L) UE: WFLs           RANGE OF MOTION--LOWER EXTREMITIES  (R) LE Hip: normal   Knee: normal   Ankle: normal    (L) LE: Hip: normal   Knee: normal   Ankle: normal      Lower Extremity Strength  Right LE  Left LE    Hip Flexion: 5/5 Hip Flexion: 5/5   Hip Extension:  5/5 Hip Extension: 5/5   Hip Abduction: 5/5 Hip Abduction: 5/5   Hip Adduction: 5/5 Hip Adduction 5/5   Knee Extension: 5/5 Knee Extension: 5/5   Knee Flexion: 5/5 Knee Flexion: 5/5   Ankle Dorsiflexion: 5/5 Ankle Dorsiflexion: 5/5   Ankle Plantarflexion: 5/5 Ankle Plantarflexion: 5/5        Evaluation   Single Limb " Stance R LE 2 sec  (<10 sec = HIGH FALL RISK)   Single Limb Stance L LE 4 sec  (<10 sec = HIGH FALL RISK)   30 second Chair Rise 5 completed with no arms     Postural control:  MCTSIB:  1. Eyes Open/feet together/Firm: 30 seconds  2. Eyes Closed/feet together/Firm: 30 seconds  3. Eyes Open/feet together/Foam: 30 seconds  4. Eyes Closed/feet together/Foam: 11 seconds    Gait Assessment:   - AD used: none  - Assistance: Mod I  - Distance: x 200'    GAIT DEVIATIONS:  Bessy displays the following deviations with ambulation: decreased B toe/floor clearance, dec B heel strike at IC, decreased B hip extension    Impairments contributing to deviations: impaired motor control, decreased balance, decreased attention    Endurance Deficit:       Evaluation   Self Selected Walking Speed 0.55 m/sec (6m/11s)   Fast Walking Speed 0.67 m/sec (6m/9s)     Functional Gait Assessment:   1. Gait on level surface =  1  2. Change in Gait Speed = 2  3. Gait with horizontal head turns  = 2  4. Gait with vertical head turns = 2  5. Gait with pivot turns = 3  6. Step over obstacle = 1  7. Gait with Narrow JOAQUIN = 2  8. Gait with eyes closed = 2  9. Ambulating Backwards = 2  10. Steps = 1    Score 18/30     Functional Mobility (Bed mobility, transfers)  Bed mobility: I  Supine to sit: I  Sit to supine: I  Transfers to bed: I  Transfers to toilet: I  Sit to stand:  I  Stand pivot:  I  Car transfers: I  Wheelchair mobility: NA    ADL's:  Feeding: I  Grooming: I  Hygiene: I  UB Dressing: I  LB Dressing: I  Toileting: I  Bathing: I    Functional Limitations Reports - G Codes  Category: Mobility  Tool: FOTO  Score: Status: 34%, Limitation: 66%  Current: CL at least 60% < 80% impaired, limited or restricted  Goal: CJ at least 20% < 40% impaired, limited or restricted    Education provided re:role of PT, goals for PT, scheduling - pt verbalized understanding. Discussed insurance limitations with pt.     Bessy verbalized good understanding of education  provided.   Pt has no cultural, educational or language barriers to learning provided.      Assessment   This is a 57 y.o. male referred to outpatient physical therapy and presents with a medical diagnosis of CNS vasculitis.  Patient presents with decreased balance, impaired gait, decreased cognition, decreased endurance, and recent falls. PT is warranted to improve mobility deficits listed above, improve safety with functional transfers, ADL, and ambulation, and decrease fall risk.      PT/PTA met face to face to discuss pt's treatment plan and established goals. Pt will be seen by physical therapy every 6th visit and minimally once per month.    Pt rehab potential is Excellent. Pt will benefit from continuing skilled outpatient physical therapy to address the deficits listed below in the problem list, provide pt/family education and to maximize pt's level of independence in the home and community environment.     History  Co-morbidities and personal factors that may impact the plan of care Examination  Body Structures and Functions, activity limitations and participation restrictions that may impact the plan of care. Medical necessity is demonstrated by the following impairments. Clinical Presentation Decision Making/ Complexity Score   1. Decreased cognition  2. History of recent falls 1. Decreased balance  2. Decreased endurance  3. Decreased attention stable    moderate high low low       Pt's spiritual, cultural and educational needs considered and pt agreeable to plan of care and goals as stated below:     GOALS:   Short term goals: 4 weeks, pt agrees to goals set.  4. Pt will be (I) with established HEP.   5. Pt will improve LLE SLS to at least 6 seconds to improve balance and decrease risk of fall.   6. Pt will improve RLE SLS to at least 4 seconds to improve balance and decrease risk of fall.   7. Pt will improve 30 second chair rise score to at least 6 times to improve safety and endurance with functional  mobility.   8. Pt will improve SSWS to at least 0.70 m/s to improve safety with community ambulation and to decrease fall risk.   9. Pt will improve FGA score to at least 22/30 to improve safety with functional mobility and decrease risk of fall.     Long term goals: 8 weeks, pt agrees to goals set  10. Pt will improve LLE SLS to at least 8 seconds to improve balance and decrease risk of fall.   11. Pt will improve RLE SLS to at least 6 seconds to improve balance and decrease risk of fall.   12. Pt will improve 30 second chair rise score to at least 7 times to improve safety and endurance with functional mobility.   13. Pt will improve SSWS to at least 0.90 m/s to improve safety with community ambulation and to decrease fall risk.   14. Pt will improve FGA score to at least 25/30 to improve safety with functional mobility and decrease risk of fall.   15. Pt will improve FOTO score to at least status 66% (limitation 34%) to improve self perception of functional mobility.       Plan   Outpatient physical therapy 2 times weekly to include: Pt Education, HEP, therapeutic exercises, neuromuscular re-education, therapeutic activities, manual therapy, joint mobilizations, aquatic therapy and modalities PRN to achieve established goals. Pt may be seen by PTA as part of the rehabilitation team.     Cont PT for  8 weeks.     Meghan Tian, PT  06/14/2017

## 2017-06-21 ENCOUNTER — TELEPHONE (OUTPATIENT)
Dept: INTERNAL MEDICINE | Facility: CLINIC | Age: 57
End: 2017-06-21

## 2017-06-21 DIAGNOSIS — R27.0 ATAXIA: Primary | ICD-10-CM

## 2017-06-21 DIAGNOSIS — H53.2 DIPLOPIA: ICD-10-CM

## 2017-06-21 DIAGNOSIS — I77.6 CNS VASCULITIS: ICD-10-CM
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
## 2017-06-21 NOTE — TELEPHONE ENCOUNTER
----- Message from Aga Woo sent at 6/21/2017 11:19 AM CDT -----  Contact: bencent [email or web address]/[phone]  Physical therapy called in regards to getting an order for physical therapy for the pt. She has to submit it for  further Approval. Fax number [phone]. Pt sees padmaja pace.        Please advise

## 2017-06-21 NOTE — TELEPHONE ENCOUNTER
Received a call from PT at [facility].  They need you to put orders in for more PT so his insurance can approve.  After you put them in they will grab them from the epic system.

## 2017-06-21 NOTE — TELEPHONE ENCOUNTER
----- Message from Aga Woo sent at 6/21/2017 11:19 AM CDT -----  Contact: bencent [email or web address]/[phone]  Physical therapy called in regards to getting an order for physical therapy for the pt. She has to submit it for  further Approval. Fax number [phone]. Pt sees padmaja pace.        Please advise

## 2017-06-22 ENCOUNTER — CLINICAL SUPPORT (OUTPATIENT)
Dept: REHABILITATION | Facility: HOSPITAL | Age: 57
End: 2017-06-22
Attending: INTERNAL MEDICINE
Payer: MEDICAID

## 2017-06-22 DIAGNOSIS — Z74.09 IMPAIRED FUNCTIONAL MOBILITY, BALANCE, GAIT, AND ENDURANCE: ICD-10-CM

## 2017-06-22 DIAGNOSIS — Z91.81 HISTORY OF RECENT FALL: ICD-10-CM

## 2017-06-22 PROCEDURE — 97110 THERAPEUTIC EXERCISES: CPT | Mod: PO

## 2017-06-22 PROCEDURE — 97112 NEUROMUSCULAR REEDUCATION: CPT | Mod: PO

## 2017-06-22 NOTE — PT/OT/SLP DISCHARGE
Occupational Therapy Discharge Summary    Bessy Nunez  MRN: [id number]   CNS vasculitis   Patient Discharged from acute Occupational Therapy on 6/4/2017.  Please refer to prior OT note dated on 6/4/2017 for functional status.     Assessment:   Patient appropriate for care in another setting.  GOALS:    Occupational Therapy Goals        Problem: Occupational Therapy Goal    Goal Priority Disciplines Outcome Interventions   Occupational Therapy Goal     OT, PT/OT Ongoing (interventions implemented as appropriate)    Description:  Goals to be met by: 06/10/17     Patient will increase functional independence with ADLs by performing:    Feeding with Modified [place].  UE Dressing with Supervision  Dressing with SBA.  Grooming while seated with Set-up Assistance.  Toileting from bedside commode with Min Assistance for hygiene and clothing management.   Supine to sit with Supervision  Toilet transfer to bedside commode with SBA with RW.  Upper extremity exercise program x12 reps per handout, with supervision.                     Reasons for Discontinuation of Therapy Services  Transfer to alternate level of care.      Plan:  Patient Discharged to: Home with Home Health Service.

## 2017-06-22 NOTE — PROGRESS NOTES
"                                                  Physical Therapy Progress Note     Name: Bessy Nunez  Clinic Number: [id number]  Diagnosis: CNS vasculitis  Encounter Diagnoses   Name Primary?   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   Pain Scale:  Denies pain    Objective     Patient received individual therapy with activities as follows:     Bessy received therapeutic exercises to develop strength, endurance, ROM and flexibility for 15 minutes including:   Recumbent bike x 10 minutes L5  Standing calf stretch on incline, 3 x 30"    Patient participated in neuromuscular re-education activities to improve: Balance, Coordination and Posture for 30 minutes. The following activities were included:   2x 30" tandem stance c/ each LE in front; CGA; 1 UE support <> no UE support  X 4 laps forward tandem ambulation in // bars; CGA to SBA; 1 UE support <> no UE support  Foam fitter: CGA to Min A   Static standing, no UE support, 2 x 30"   Forward chest press c/ yellow weighted ball, x 30"   Forward shoulder flexion/extension c/ yellow weighted ball, x 30"   LTR c/ yellow weighted ball, x 30"  Obstacle course: 3 rows of large cones placed on their side, 2 foam " no-pads, across foam fitter   X 4 trials forward ambulation; VCs for each step of obstacle course   X 4 trials sideways R and L: VCs for each step of obstacle course  Cone taps c/ BLE, 2 x 10, no UE support; CGA  Figure four: SLS c/ clockwise toe taps into each grid c/ each LE, 1 x 10, no UE support, CGA; VCs for sequencing    Written Home Exercises: next therapy session  Pt demo good understanding of the education provided. Bessy demonstrated good return demonstration of activities.     Education provided re: POC, HEP  No spiritual or educational barriers to learning provided    Pt has no cultural, educational or language barriers to learning provided.    Assessment   Bessy tolerated first follow-up therapy session well this morning. Endurance and balance activities initiated. Throughout balance activities, especially tasks with multiple steps, pt required constant VCs for problem solving and to complete following step. Decreased safety awareness, decreased initiation, decreased problem solving noted during more complex and multi-step balance activities. Continue therapy to progress strength, balance, endurance, and safety awareness with overall mobility.     Pt rehab potential is Excellent. Pt will benefit from continuing skilled outpatient physical therapy to address the deficits listed below in the problem list, provide pt/family education and to maximize pt's level of independence in the home and community environment.      History  Co-morbidities and personal factors that may impact the plan of care Examination  Body Structures and Functions, activity limitations and participation restrictions that may impact the plan of care. Medical necessity is demonstrated by the following impairments. Clinical Presentation Decision Making/ Complexity Score   1. Decreased cognition  2. History of recent falls 1. Decreased balance  2. Decreased endurance  3. Decreased attention stable     moderate high low low          Pt's spiritual, cultural and educational needs considered and pt agreeable to plan of care and goals as stated below:      GOALS:   Short term goals: 4 weeks, pt agrees to goals set.  4. Pt will be (I) with established HEP.   5. Pt will improve LLE SLS to at least 6 seconds to improve balance and decrease risk of fall.   6. Pt will improve RLE SLS to at least 4 seconds to improve balance and decrease risk of fall.   7. Pt will improve 30 second chair rise score to at least 6 times to improve safety and endurance with functional mobility.   8. Pt will improve SSWS to at least 0.70 m/s to improve safety with community ambulation and to decrease fall risk.   9. Pt will improve FGA score to at least 22/30 to improve safety with functional mobility and decrease risk of fall.      Long term goals: 8 weeks, pt agrees to goals set  10. Pt will improve LLE SLS to at least 8 seconds to improve balance and decrease risk of fall.   11. Pt will improve RLE SLS to at least 6 seconds to improve balance and decrease risk of fall.   12. Pt will improve 30 second chair rise score to at least 7 times to improve safety and endurance with functional mobility.   13. Pt will improve SSWS to at least 0.90 m/s to improve safety with community ambulation and to decrease fall risk.   14. Pt will improve FGA score to at least 25/30 to improve safety with functional mobility and decrease risk of fall.   15. Pt will improve FOTO score to at least status 66% (limitation 34%) to improve self perception of functional mobility.           Plan   Continue PT 2x weekly under established Plan of Care, with treatment to include: pt education, HEP, therapeutic exercises, neuromuscular re-education/balance exercises, therapeutic activities, joint mobilizations, and modalities PRN, to work towards established goals. Pt may be seen by PTA to carry out plan of care.     Meghan Tian, PT   06/22/2017

## 2017-06-25 ENCOUNTER — TELEPHONE (OUTPATIENT)
Dept: FAMILY MEDICINE | Facility: CLINIC | Age: 57
End: 2017-06-25

## 2017-06-25 NOTE — TELEPHONE ENCOUNTER
This clinical information was not verified.   Medications   This clinical information was not verified, but there are updates pending review:   No Longer Applicable Medications Start Date Reported By  Comments   benzphetamine 50 mg Tab 5/2/2017 Bessy Nunez I took this medication for a weightloss program that I'm no longer involved in.   Allergies   This clinical information was not verified.     Med card updated.

## 2017-06-30 ENCOUNTER — OFFICE VISIT (OUTPATIENT)
Dept: SLEEP MEDICINE | Facility: CLINIC | Age: 57
End: 2017-06-30
Payer: MEDICAID

## 2017-06-30 ENCOUNTER — CLINICAL SUPPORT (OUTPATIENT)
Dept: REHABILITATION | Facility: HOSPITAL | Age: 57
End: 2017-06-30
Attending: INTERNAL MEDICINE
Payer: MEDICAID

## 2017-06-30 VITALS
BODY MASS INDEX: 30.14 KG/M2 | SYSTOLIC BLOOD PRESSURE: 102 MMHG | HEART RATE: 91 BPM | WEIGHT: 210.56 LBS | DIASTOLIC BLOOD PRESSURE: 66 MMHG | HEIGHT: 70 IN | OXYGEN SATURATION: 95 %

## 2017-06-30 DIAGNOSIS — Z74.09 IMPAIRED FUNCTIONAL MOBILITY, BALANCE, GAIT, AND ENDURANCE: ICD-10-CM

## 2017-06-30 DIAGNOSIS — G47.10 HYPERSOMNIA: ICD-10-CM

## 2017-06-30 DIAGNOSIS — G47.33 OSA (OBSTRUCTIVE SLEEP APNEA): Primary | ICD-10-CM

## 2017-06-30 DIAGNOSIS — Z91.81 HISTORY OF RECENT FALL: ICD-10-CM

## 2017-06-30 PROCEDURE — 99999 PR PBB SHADOW E&M-EST. PATIENT-LVL III: CPT | Mod: PBBFAC,,, | Performed by: INTERNAL MEDICINE

## 2017-06-30 PROCEDURE — 97112 NEUROMUSCULAR REEDUCATION: CPT

## 2017-06-30 PROCEDURE — 99213 OFFICE O/P EST LOW 20 MIN: CPT | Mod: PBBFAC,PO | Performed by: INTERNAL MEDICINE

## 2017-06-30 PROCEDURE — 99204 OFFICE O/P NEW MOD 45 MIN: CPT | Mod: S$PBB,,, | Performed by: INTERNAL MEDICINE

## 2017-06-30 NOTE — LETTER
June 30, 2017      Edgar Nova MD  [address]  [place] [address]           [facility]  [address]  [place] [address]  Phone: [phone]  Fax: [phone]          Patient: Bessy Nunez   MR Number: [id number]   YOB: 1960   Date of Visit: 6/30/2017       Dear Dr. Edgar Nova:    Thank you for referring Bessy Nunez to me for evaluation. Attached you will find relevant portions of my assessment and plan of care.    If you have questions, please do not hesitate to call me. I look forward to following Bessy Nunez along with you.    Sincerely,    Jez Espinoza MD    Enclosure  CC:  No Recipients    If you would like to receive this communication electronically, please contact [email or web address] or [phone] to request more information on [facility] access.    For providers and/or their staff who would like to refer a patient to [facility], please contact us through our one-stop-shop provider referral line, [facility], at [phone].    If you feel you have received this communication in error or would no longer like to receive these types of communications, please e-mail [email or web address]

## 2017-06-30 NOTE — PROGRESS NOTES
Bessy Nunez  was seen as a new patient at the request of  Edgar Nova MD for the evaluation of  med.    CHIEF COMPLAINT:    Chief Complaint   Patient presents with    Sleep Apnea       HISTORY OF PRESENT ILLNESS: Bessy Nunez is a 57 y.o. male is here for sleep evaluation.   Patient was recently admitted 5/23/17 to 6/4/17 for ataxia and diplopia.  MRI revealed multiple scattered foci (cardio-embolic stroke vs demylination).  Patient was treated with cytoxan and steroid.  Patient was discharged with neuro follow up.  Patient was referred for excessive daytime sleepiness since hospitalization.  +snoring and witnessed apnea by wife x several years.  +fatigue upon awake.  Patient no longer drive.  No cataplexy.  No rls symptoms.      Hartley Sleepiness Scale score during initial sleep evaluation was 18.    SLEEP ROUTINE:  Activity the hour prior to sleep: watch tv     Bed partner:  wife  Time to bed:  9 pm   Lights off:  Yes   Sleep onset latency:  30 minutes         Disruptions or awakenings:    4-5 times per night (no difficulty going back to sleep)    Wakeup time:      8 am   Perceived sleep quality:  tire       Daytime naps:      Frequent napping  Weekend sleep routine:      same  Caffeine use: 1-2 diet coke per day   exercise habit:   none      PAST MEDICAL HISTORY:    Active Ambulatory Problems     Diagnosis Date Noted    Depressive disorder, not elsewhere classified 11/09/2012    Mixed hyperlipidemia 11/09/2012    Essential hypertension 11/09/2012    Lipodystrophy 10/11/2013    Type 2 diabetes mellitus with hyperglycemia, with long-term current use of insulin 10/11/2013    NAFLD (nonalcoholic fatty liver disease) 10/11/2013    Obesity 10/11/2013    ED (erectile dysfunction) 10/11/2013    Sleep apnea 10/11/2013    Hx of colonic polyp 01/30/2015    Left facial numbness 03/22/2017    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    Amblyopia 05/13/2017    Demyelinating changes in brain  05/13/2017    Ataxia 05/13/2017    Type 2 diabetes mellitus with diabetic polyneuropathy, with long-term current use of insulin 05/14/2017    Syncope 05/23/2017    Leukocytosis 05/24/2017    Diplopia 05/24/2017    Encephalopathy 05/26/2017    Fall 06/04/2017    Acute encephalopathy     Acute blood loss anemia 05/28/2017    Upper GI bleed 05/28/2017    Multiple sclerosis 06/02/2017    CNS vasculitis 06/02/2017    Hypomagnesemia 06/04/2017    Hypokalemia 06/04/2017    Urinary incontinence 06/04/2017    Impaired functional mobility, balance, gait, and endurance 06/14/2017    History of recent fall 06/14/2017     Resolved Ambulatory Problems     Diagnosis Date Noted    Benign neoplasm of colon 11/09/2012    Type II or unspecified type diabetes mellitus without mention of complication, not stated as uncontrolled 11/09/2012    Hyperlipidemia 10/11/2013    HTN (hypertension) 10/11/2013    Acute confusional state 05/14/2017     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    Mixed hyperlipidemia 11/9/2012    NAFLD (nonalcoholic fatty liver disease) 10/11/2013    CHASE (nonalcoholic steatohepatitis)     Obesity     Stroke     Type 2 diabetes mellitus with diabetic polyneuropathy, with long-term current use of insulin 5/14/2017                PAST SURGICAL HISTORY:    Past Surgical History:   Procedure Laterality Date    SKIN CANCER EXCISION           FAMILY HISTORY:                Family History   Problem Relation Age of Onset    Heart disease Mother     Hypertension Mother     Heart failure Mother     Cancer Father      lung    Diabetes Maternal Aunt        SOCIAL HISTORY:          Tobacco:   History   Smoking Status    Never Smoker   Smokeless Tobacco    Never Used       alcohol use:    History   Alcohol Use No                 Occupation:  Former  for [employer]    ALLERGIES:  Review of  "patient's allergies indicates:  No Known Allergies    CURRENT MEDICATIONS:    Current Outpatient Prescriptions   Medication Sig Dispense Refill    aspirin (ECOTRIN) 81 MG EC tablet Take 81 mg by mouth once daily.      atenolol (TENORMIN) 50 MG tablet Take 1 tablet (50 mg total) by mouth once daily. 90 tablet 3    atorvastatin (LIPITOR) 40 MG tablet Take 1 tablet (40 mg total) by mouth once daily. 90 tablet 3    diltiaZEM (DILACOR XR) 240 MG CDCR Take 1 capsule (240 mg total) by mouth once daily. 90 capsule 3    insulin degludec (TRESIBA FLEXTOUCH U-200) 200 unit/mL (3 mL) InPn Inject 15 Units into the skin every evening. 3 Syringe 1    insulin needles, disposable, (BD ULTRA-FINE ANA PEN NEEDLES) 32 x 5/32 " Ndle Inject twice daily 100 each 3    levetiracetam (KEPPRA) 500 MG Tab Take 1 tablet (500 mg total) by mouth 2 (two) times daily. 60 tablet 2    liraglutide 0.6 mg/0.1 mL, 18 mg/3 mL, subq PNIJ (VICTOZA 3-TOMASZ) 0.6 mg/0.1 mL (18 mg/3 mL) PnIj Inject 1.8 mg into the skin once daily. 9 mL 11    metformin (GLUCOPHAGE-XR) 500 MG 24 hr tablet Take 2 tablets (1,000 mg total) by mouth 2 (two) times daily with meals. 360 tablet 0    omega-3 fatty acids-vitamin E (FISH OIL) 1,000 mg Cap Take 1 capsule by mouth 2 (two) times daily.  0    ranitidine (ZANTAC) 150 MG tablet Take 1 tablet (150 mg total) by mouth 2 (two) times daily. 60 tablet 11    sertraline (ZOLOFT) 100 MG tablet 1 and half tablet daily (Patient taking differently: Take 150 mg by mouth once daily. ) 135 tablet 3    valsartan (DIOVAN) 320 MG tablet Take 1 tablet (320 mg total) by mouth once daily. 90 tablet 3    vitamin D 1000 units Tab Take 5 tablets (5,000 Units total) by mouth once daily.       No current facility-administered medications for this visit.                   REVIEW OF SYSTEMS:     Sleep related symptoms as per HPI.  CONST:Denies weight gain    HEENT: Denies sinus congestion; double vision  PULM: Denies dyspnea  CARD:  Denies " "palpitations   GI:  Denies acid reflux  : Denies polyuria  NEURO: Denies headaches; ataxia   PSYCH: Denies mood disturbance  HEME: Denies anemia   Otherwise, a balance of systems reviewed is negative.          PHYSICAL EXAM:  Vitals:    06/30/17 1317   BP: 102/66   Pulse: 91   SpO2: 95%   Weight: 95.5 kg (210 lb 8.6 oz)   Height: 5' 10" (1.778 m)   PainSc: 0-No pain     Body mass index is 30.21 kg/m².     GENERAL: Normal development, well groomed  HEENT:  Conjunctivae are non-erythematous; Pupils equal, round, and reactive to light; Nose is symmetrical; Nasal mucosa is pink and moist; Septum is midline; Inferior turbinates are normal; Nasal airflow is normal; Posterior pharynx is pink; Modified Mallampati: 3; Posterior palate is normal; Tonsils +1; Uvula is normal and pink;Tongue is normal; Dentition is fair; No TMJ tenderness; Jaw opening and protrusion without click and without discomfort.  NECK: Supple. Neck circumference is 17 inches. No thyromegaly. No palpable nodes.     SKIN: On face and neck: No abrasions, no rashes, no lesions.  No subcutaneous nodules are palpable.  RESPIRATORY: Chest is clear to auscultation.  Normal chest expansion and non-labored breathing at rest.  CARDIOVASCULAR: Normal S1, S2.  No murmurs, gallops or rubs. No carotid bruits bilaterally.  EXTREMITIES: No edema. No clubbing. No cyanosis. Station normal. Gait normal.        NEURO/PSYCH: Oriented to time, place and person. Normal attention span and concentration. Affect is full. Mood is normal.                                              DATA no prior sleep study  5/30/17    1 - Normal left ventricular systolic function (EF 60-65%).     2 - Normal right ventricular systolic function .     3 - Normal left ventricular diastolic function.     4 - Mild left atrial enlargement.     Mri 5/28/17 Small to punctate-sized scattered foci of signal abnormality cerebral hemispheres, left cerebellum and brainstem similarly seen on the prior " allowing for motion limitation.   Lab Results   Component Value Date    TSH 1.786 05/24/2017     ASSESSMENT    ICD-10-CM ICD-9-CM    1. JOHN (obstructive sleep apnea) G47.33 327.23 Polysomnogram (CPAP will be added if patient meets diagnostic criteria.)   2. Hypersomnia G47.10 780.54        PLAN:    Sleep Apnea NEC. The patient symptomatically has loud snoring, restless sleep with findings of daytime somnolence, htn, enlarge neck. This warrants further investigation for possible obstructive sleep apnea.  Patient will be contacted after sleep study is done.  CNS pathology warrants in lab evaluation.      Hypersomnia - untreated john vs CNS medical condition.  Will monitor.      Diagnostic: Polysomnogram. The nature of this procedure and its indication was discussed with the patient.     Education: During our discussion today, we talked about the etiology of obstructive sleep apnea as well as the potential ramifications of untreated sleep apnea, which could include daytime sleepiness, hypertension, heart disease and/or stroke.     Precautions: The patient was advised to abstain from driving should they feel sleepy or drowsy.       Thank you for allowing me the opportunity to participate in the care of your patient.    Patient will No Follow-up on file. with md.    Please cc note to  Edgar Nova MD.     This is 45 minutes visit, over 50% of time spent in direct consultation with patient.

## 2017-06-30 NOTE — PATIENT INSTRUCTIONS
Ingrid or Marcio will contact you to schedule your sleep study. Their number is [phone] (ext 2). The [facility] is located on 7th floor of the [facility].    We will call you when the sleep study results are ready - if you have not heard from us by 2 weeks from the date of the study, please call [phone] (ext 1).    You are advised to abstain from driving should you feel sleepy or drowsy.

## 2017-06-30 NOTE — PROGRESS NOTES
"Name: Bessy Nunez  Clinic Number: [id number]  Date of Treatment: 6/30/2017  Diagnosis:     ICD-10-CM ICD-9-CM    1. Impaired functional mobility, balance, gait, and endurance Z74.09 V49.89    2. History of recent fall Z91.81 V15.88      Evaluation Date: 6/14/17  Visit #: 3  Plan of care expiration: 8/9/17  Precautions: universal, fall    Subjective: Bessy Nunez reports denies pain.  Patient reports he has not had any falls since last PT session.  Objective:  Patient was educated and performed individual therapy to increase strength, endurance, improved balance, and gait stability with activities as follows:     Bessy Nunez was educated and performed therapeutic exercises to develop  strength, endurance, improved balance, and gait stability for 45 total minutes including:       Patient participated in neuromuscular re-education activities to improve: Balance, Coordination and Posture for 45 minutes. The following activities were included:   3 x 30" tandem stance c/ each LE in front; CGA; 1 UE support <> no UE support  4 laps forward tandem ambulation in // bars; SBA; 1 UE support <> no UE support  High step amb in // bars 4 x 10 ft  Lateral step outs with UEs extended  Forward step outs with patient sliding contralateral UE along parallel bar to increase step length  abd walking 4 x 30 ft with CGA  Retro walking 4 x 30 ft with CGA    Static standing on foam, no UE support, 2 x 30" with CGA to Min A   Forward chest press c/ yellow weighted ball, x 30" with CGA to Min A   Forward shoulder flexion/extension c/ yellow weighted ball, x 30" with CGA to Min A   LTR c/ yellow weighted ball on foam, x 30" with CGA to Min A  Cone taps c/ BLE, 2 x 10, no UE support; CGA      Not performed today:  Figure four: SLS c/ clockwise toe taps into each grid c/ each LE, 1 x 10, no UE support, CGA; VCs for sequencing    Written Home Exercises:   No HEP given today    No spiritual or educational barriers to learning " provided       Assessment:   Arrived to therapy displaying shuffling gait pattern with decreased heel strike bilaterally. Requires close supervision and frequent cuing to remain on task.    Pt will continue to benefit from skilled PT intervention. Medical Necessity is demonstrated by:  Fall Risk, Unable to participate fully in daily activities, Requires skilled supervision to complete and progress HEP and Weakness.    Patient is making good progress towards established goals.    New/Revised goals: continue with current goals  GOALS:   Short term goals: 4 weeks, pt agrees to goals set.  4. Pt will be (I) with established HEP.   5. Pt will improve LLE SLS to at least 6 seconds to improve balance and decrease risk of fall.   6. Pt will improve RLE SLS to at least 4 seconds to improve balance and decrease risk of fall.   7. Pt will improve 30 second chair rise score to at least 6 times to improve safety and endurance with functional mobility.   8. Pt will improve SSWS to at least 0.70 m/s to improve safety with community ambulation and to decrease fall risk.   9. Pt will improve FGA score to at least 22/30 to improve safety with functional mobility and decrease risk of fall.      Long term goals: 8 weeks, pt agrees to goals set  10. Pt will improve LLE SLS to at least 8 seconds to improve balance and decrease risk of fall.   11. Pt will improve RLE SLS to at least 6 seconds to improve balance and decrease risk of fall.   12. Pt will improve 30 second chair rise score to at least 7 times to improve safety and endurance with functional mobility.   13. Pt will improve SSWS to at least 0.90 m/s to improve safety with community ambulation and to decrease fall risk.   14. Pt will improve FGA score to at least 25/30 to improve safety with functional mobility and decrease risk of fall.   15. Pt will improve FOTO score to at least status 66% (limitation 34%) to improve self perception of functional mobility.      Plan:  Continue  with established Plan of Care towards PT goals.

## 2017-07-03 ENCOUNTER — DOCUMENTATION ONLY (OUTPATIENT)
Dept: REHABILITATION | Facility: HOSPITAL | Age: 57
End: 2017-07-03

## 2017-07-03 ENCOUNTER — TELEPHONE (OUTPATIENT)
Dept: NEUROLOGY | Facility: CLINIC | Age: 57
End: 2017-07-03

## 2017-07-03 PROCEDURE — 99496 TRANSJ CARE MGMT HIGH F2F 7D: CPT | Mod: S$PBB,,, | Performed by: INTERNAL MEDICINE

## 2017-07-03 NOTE — PROGRESS NOTES
Pt is being transferred to [facility] for continuation of physical therapy care under Uli Selby PT.

## 2017-07-07 ENCOUNTER — CLINICAL SUPPORT (OUTPATIENT)
Dept: REHABILITATION | Facility: HOSPITAL | Age: 57
End: 2017-07-07
Attending: INTERNAL MEDICINE
Payer: MEDICAID

## 2017-07-07 DIAGNOSIS — Z74.09 IMPAIRED FUNCTIONAL MOBILITY, BALANCE, GAIT, AND ENDURANCE: Primary | ICD-10-CM

## 2017-07-07 PROCEDURE — 97110 THERAPEUTIC EXERCISES: CPT

## 2017-07-07 PROCEDURE — 97112 NEUROMUSCULAR REEDUCATION: CPT

## 2017-07-07 NOTE — PROGRESS NOTES
"Name: Bessy Nunez  Clinic Number: [id number]  Date of Treatment: 7/7/2017  Diagnosis:   No diagnosis found.  Evaluation Date: 6/14/17  Visit #: 4  Plan of care expiration: 8/9/17  Precautions: universal, fall    Subjective:   Pt reports w/ no c/o pn and feels as if he is walking a little better.    Objective:  Patient was educated and performed individual therapy to increase strength, endurance, improved balance, and gait stability with activities as follows:     Bessy Nunez was educated and performed therapeutic exercises to develop  strength, endurance, improved balance, and gait stability for 45 total minutes including:       Patient participated in neuromuscular re-education activities to improve: Balance, Coordination and Posture for 52 minutes. The following activities were included:   3 x 30" tandem stance c/ each LE in front; CGA; 1 UE support <> no UE support NP  4 laps forward tandem ambulation in // bars; SBA; 1 UE support <> no UE support  NP  High step amb in // bars 6 x 10 ft  Lateral step outs with UEs extended NP  Forward step outs with patient sliding contralateral UE along parallel bar to increase step length Np  abd walking 4 x 30 ft with CGA no UE assist in parallel bars  Retro walking 4 x 30 ft with SBA no UE assist in parallel bars  Cone hesitation walk 4 cone 6 laps parallel bars CGA  March on foam SBA 1 min  Static standing on foam, no UE support, 2 x 30" with SBA  Standing hip ext x 10 ea leg CGA-min assist UE on SB B   Standing hip abd 2 x 10 ea leg CGA-min assist UE on SB B     Forward chest press c/ yellow weighted ball, x 30" with CGA to Min A Np  Forward shoulder flexion/extension c/ yellow weighted ball, x 30" with CGA to Min A NP  LTR c/ yellow weighted ball on foam, x 30" with CGA to Min A NP  Cone taps c/ BLE, 2 x 10, no UE support; CGA NP      Not performed today:  Figure four: SLS c/ clockwise toe taps into each grid c/ each LE, 1 x 10, no UE support, CGA; VCs for " sequencing    Written Home Exercises:   No HEP given today    No spiritual or educational barriers to learning provided       Assessment:   Pt sara tx well w/ no c/o pn.  Pt displayed increased core stability and gait during therex w/ VCs for technique.  Pt edu on and instructed to cont HEP.  Cont to progress as sara.      Pt will continue to benefit from skilled PT intervention. Medical Necessity is demonstrated by:  Fall Risk, Unable to participate fully in daily activities, Requires skilled supervision to complete and progress HEP and Weakness.    Patient is making good progress towards established goals.    New/Revised goals: continue with current goals  GOALS:   Short term goals: 4 weeks, pt agrees to goals set.  4. Pt will be (I) with established HEP.   5. Pt will improve LLE SLS to at least 6 seconds to improve balance and decrease risk of fall.   6. Pt will improve RLE SLS to at least 4 seconds to improve balance and decrease risk of fall.   7. Pt will improve 30 second chair rise score to at least 6 times to improve safety and endurance with functional mobility.   8. Pt will improve SSWS to at least 0.70 m/s to improve safety with community ambulation and to decrease fall risk.   9. Pt will improve FGA score to at least 22/30 to improve safety with functional mobility and decrease risk of fall.      Long term goals: 8 weeks, pt agrees to goals set  10. Pt will improve LLE SLS to at least 8 seconds to improve balance and decrease risk of fall.   11. Pt will improve RLE SLS to at least 6 seconds to improve balance and decrease risk of fall.   12. Pt will improve 30 second chair rise score to at least 7 times to improve safety and endurance with functional mobility.   13. Pt will improve SSWS to at least 0.90 m/s to improve safety with community ambulation and to decrease fall risk.   14. Pt will improve FGA score to at least 25/30 to improve safety with functional mobility and decrease risk of fall.   15. Pt  will improve FOTO score to at least status 66% (limitation 34%) to improve self perception of functional mobility.      Plan:  Continue with established Plan of Care towards PT goals.

## 2017-07-11 ENCOUNTER — CLINICAL SUPPORT (OUTPATIENT)
Dept: REHABILITATION | Facility: HOSPITAL | Age: 57
End: 2017-07-11
Attending: INTERNAL MEDICINE
Payer: MEDICAID

## 2017-07-11 DIAGNOSIS — Z74.09 IMPAIRED FUNCTIONAL MOBILITY, BALANCE, GAIT, AND ENDURANCE: Primary | ICD-10-CM

## 2017-07-11 PROCEDURE — 97110 THERAPEUTIC EXERCISES: CPT

## 2017-07-11 PROCEDURE — 97112 NEUROMUSCULAR REEDUCATION: CPT

## 2017-07-11 NOTE — PROGRESS NOTES
"Name: Bessy Nunez  Clinic Number: [id number]  Date of Treatment: 7/11/2017  Diagnosis:     ICD-10-CM ICD-9-CM    1. Impaired functional mobility, balance, gait, and endurance Z74.09 V49.89      Evaluation Date: 6/14/17  Visit #: 6  Plan of care expiration: 8/9/17  Precautions: universal, fall    Subjective:   Pt states feeling fine and waling better.       Objective:  Pt cont to amb into clinic w/ shuffling gait pattern     Treatment:  Bessy Nunez was educated and performed therapeutic exercises to develop  strength, endurance, improved balance, and gait stability including:     Patient participated in neuromuscular re-education activities to improve: Balance, Coordination and Posture for 30 min minutes. The following activities were included:   3 x 30" tandem stance c/ each LE in front; CGA to min assist no UE support   4 laps forward tandem ambulation in // bars; SBA; 1 UE support <> no UE support  NP  High step amb in // bars 6 x 10 ft UE assist   Lateral step outs with UEs extended NP  Forward step outs with patient sliding contralateral UE along parallel bar to increase step length Np  abd walking 4 x 30 ft with SBA no UE assist in parallel bars  Retro walking 4 x 30 ft with SBA no UE assist in parallel bars  Cone hesitation walk 4 cone 6 laps parallel bars SBA  March on foam SBA 1 min x 2  Static standing on foam, no UE support, 2 x 30" with SBA NP  Standing hip ext x 10 ea leg CGA-min assist UE on SB B   Standing hip abd 2 x 10 ea leg CGA-min assist UE on SB B     Forward chest press c/ yellow weighted ball, x 30" with CGA to Min A Np  Forward shoulder flexion/extension c/ yellow weighted ball, x 30" with CGA to Min A NP  LTR c/ yellow weighted ball on foam, x 30" with CGA to Min A NP  Cone taps c/ BLE, 2 x 10, no UE support; CGA NP      Not performed today:  Figure four: SLS c/ clockwise toe taps into each grid c/ each LE, 1 x 10, no UE support, CGA; VCs for sequencing    Written Home Exercises:   No HEP " given today    No spiritual or educational barriers to learning provided       Assessment:   Pt demonstrated increased balance and strength during therex w/ VCs for technique.  Pt sara tx well w/ no c/o pn.  Pt edu on and instructed to cont HEP.  Cont to progress as sara.      Pt will continue to benefit from skilled PT intervention. Medical Necessity is demonstrated by:  Fall Risk, Unable to participate fully in daily activities, Requires skilled supervision to complete and progress HEP and Weakness.    Patient is making good progress towards established goals.    New/Revised goals: continue with current goals  GOALS:   Short term goals: 4 weeks, pt agrees to goals set.  4. Pt will be (I) with established HEP.   5. Pt will improve LLE SLS to at least 6 seconds to improve balance and decrease risk of fall.   6. Pt will improve RLE SLS to at least 4 seconds to improve balance and decrease risk of fall.   7. Pt will improve 30 second chair rise score to at least 6 times to improve safety and endurance with functional mobility.   8. Pt will improve SSWS to at least 0.70 m/s to improve safety with community ambulation and to decrease fall risk.   9. Pt will improve FGA score to at least 22/30 to improve safety with functional mobility and decrease risk of fall.      Long term goals: 8 weeks, pt agrees to goals set  10. Pt will improve LLE SLS to at least 8 seconds to improve balance and decrease risk of fall.   11. Pt will improve RLE SLS to at least 6 seconds to improve balance and decrease risk of fall.   12. Pt will improve 30 second chair rise score to at least 7 times to improve safety and endurance with functional mobility.   13. Pt will improve SSWS to at least 0.90 m/s to improve safety with community ambulation and to decrease fall risk.   14. Pt will improve FGA score to at least 25/30 to improve safety with functional mobility and decrease risk of fall.   15. Pt will improve FOTO score to at least status 66%  (limitation 34%) to improve self perception of functional mobility.      Plan:  Continue with established Plan of Care towards PT goals.

## 2017-07-12 ENCOUNTER — TELEPHONE (OUTPATIENT)
Dept: PULMONOLOGY | Facility: CLINIC | Age: 57
End: 2017-07-12

## 2017-07-12 NOTE — TELEPHONE ENCOUNTER
----- Message from Valentine Traylor sent at 7/12/2017 12:15 PM CDT -----  Contact: Bekah  [phone]  [place]   -    Requesting a call back in regards to sleepy study appointment   Thanks,

## 2017-07-12 NOTE — TELEPHONE ENCOUNTER
----- Message from Norris Valdez sent at 7/11/2017 12:42 PM CDT -----  Contact: Bekah  Requesting a call back in regards to a sleep study appointment.    Please call [phone].

## 2017-07-13 ENCOUNTER — CLINICAL SUPPORT (OUTPATIENT)
Dept: REHABILITATION | Facility: HOSPITAL | Age: 57
End: 2017-07-13
Attending: INTERNAL MEDICINE
Payer: MEDICAID

## 2017-07-13 ENCOUNTER — TELEPHONE (OUTPATIENT)
Dept: SLEEP MEDICINE | Facility: CLINIC | Age: 57
End: 2017-07-13

## 2017-07-13 DIAGNOSIS — Z74.09 IMPAIRED FUNCTIONAL MOBILITY, BALANCE, GAIT, AND ENDURANCE: Primary | ICD-10-CM

## 2017-07-13 LAB
ANCA AB TITR SER IF: NORMAL TITER
ANCA AB TITR SER IF: NORMAL TITER
CRYOGLOB SER QL: NORMAL
P-ANCA TITR SER IF: NORMAL TITER
P-ANCA TITR SER IF: NORMAL TITER

## 2017-07-13 PROCEDURE — 97112 NEUROMUSCULAR REEDUCATION: CPT

## 2017-07-13 PROCEDURE — 97110 THERAPEUTIC EXERCISES: CPT

## 2017-07-13 NOTE — PROGRESS NOTES
"Name: Bessy Nunez  Clinic Number: [id number]  Date of Treatment: 7/13/2017  Diagnosis:     ICD-10-CM ICD-9-CM    1. Impaired functional mobility, balance, gait, and endurance Z74.09 V49.89      Evaluation Date: 6/14/17  Visit #: 7  Plan of care expiration: 8/9/17  Precautions: universal, fall    Subjective:   Pt reports w/ no c/o pn but is feeling a little off balance today.       Objective:  Pt cont to amb into clinic w/ shuffling gait pattern     Treatment:  Bessy Nunez was educated and performed therapeutic exercises to develop  strength, endurance, improved balance, and gait stability including:     Patient participated in neuromuscular re-education activities to improve: Balance, Coordination and Posture for 30 min minutes. The following activities were included:   3 x 30" tandem stance c/ each LE in front; CGA to min assist no UE support   4 laps forward tandem ambulation in // bars; SBA; 1 UE support <> no UE support  NP  High step amb in // bars 5 x 10 ft UE assist   Lateral step outs with UEs extended NP  Forward step outs with patient sliding contralateral UE along parallel bar to increase step length Np  abd walking 5 x 30 ft with CGA no UE assist in parallel bars  Retro walking 5 x 30 ft with CGA no UE assist in parallel bars  Cone hesitation walk 4 cone 6 laps parallel bars SBA NP  March on foam SBA 90 sec x 2 min  Heel taps over cone CGA 2 x 10 B   Amb around clinic 2 x 165 ft w/ VCs for heel-toe walking  Static standing on foam, no UE support, 2 x 30" with SBA NP  Standing hip ext x 10 ea leg CGA-min assist UE on SB B Np  Standing hip abd 2 x 10 ea leg CGA-min assist UE on SB B  NP    Forward chest press c/ yellow weighted ball, x 30" with CGA to Min A Np  Forward shoulder flexion/extension c/ yellow weighted ball, x 30" with CGA to Min A NP  LTR c/ yellow weighted ball on foam, x 30" with CGA to Min A NP  Cone taps c/ BLE, 2 x 10, no UE support; CGA NP      Not performed today:  Figure four: SLS c/ " clockwise toe taps into each grid c/ each LE, 1 x 10, no UE support, CGA; VCs for sequencing    Written Home Exercises:   No HEP given today    No spiritual or educational barriers to learning provided       Assessment:   Pt sara tx well w/ no c/o pn.  Pt displayed good effort during therex w/ VCs for technique.  Therex reduced 2* decreased balance today.  Pt edu on and instructed to cont HEP.  Cont to progress as sara.      Pt will continue to benefit from skilled PT intervention. Medical Necessity is demonstrated by:  Fall Risk, Unable to participate fully in daily activities, Requires skilled supervision to complete and progress HEP and Weakness.    Patient is making good progress towards established goals.    New/Revised goals: continue with current goals  GOALS:   Short term goals: 4 weeks, pt agrees to goals set.  4. Pt will be (I) with established HEP.   5. Pt will improve LLE SLS to at least 6 seconds to improve balance and decrease risk of fall.   6. Pt will improve RLE SLS to at least 4 seconds to improve balance and decrease risk of fall.   7. Pt will improve 30 second chair rise score to at least 6 times to improve safety and endurance with functional mobility.   8. Pt will improve SSWS to at least 0.70 m/s to improve safety with community ambulation and to decrease fall risk.   9. Pt will improve FGA score to at least 22/30 to improve safety with functional mobility and decrease risk of fall.      Long term goals: 8 weeks, pt agrees to goals set  10. Pt will improve LLE SLS to at least 8 seconds to improve balance and decrease risk of fall.   11. Pt will improve RLE SLS to at least 6 seconds to improve balance and decrease risk of fall.   12. Pt will improve 30 second chair rise score to at least 7 times to improve safety and endurance with functional mobility.   13. Pt will improve SSWS to at least 0.90 m/s to improve safety with community ambulation and to decrease fall risk.   14. Pt will improve FGA  score to at least 25/30 to improve safety with functional mobility and decrease risk of fall.   15. Pt will improve FOTO score to at least status 66% (limitation 34%) to improve self perception of functional mobility.      Plan:  Continue with established Plan of Care towards PT goals.

## 2017-07-13 NOTE — TELEPHONE ENCOUNTER
Spoken with pt wife and inform her that it takes 21 days for medicaid to approve sleep study her  caes is still pending

## 2017-07-19 ENCOUNTER — TELEPHONE (OUTPATIENT)
Dept: NEUROLOGY | Facility: CLINIC | Age: 57
End: 2017-07-19

## 2017-07-19 ENCOUNTER — OFFICE VISIT (OUTPATIENT)
Dept: NEUROLOGY | Facility: CLINIC | Age: 57
End: 2017-07-19
Payer: MEDICAID

## 2017-07-19 VITALS
HEART RATE: 83 BPM | HEIGHT: 70 IN | DIASTOLIC BLOOD PRESSURE: 70 MMHG | SYSTOLIC BLOOD PRESSURE: 108 MMHG | BODY MASS INDEX: 30.06 KG/M2 | WEIGHT: 210 LBS

## 2017-07-19 DIAGNOSIS — I77.6 VASCULITIS: Primary | ICD-10-CM

## 2017-07-19 DIAGNOSIS — R41.3 MEMORY PROBLEM: ICD-10-CM

## 2017-07-19 DIAGNOSIS — Z79.899 HIGH RISK MEDICATION USE: Primary | ICD-10-CM

## 2017-07-19 DIAGNOSIS — I77.6 CNS VASCULITIS: ICD-10-CM

## 2017-07-19 PROBLEM — D72.829 LEUKOCYTOSIS: Status: RESOLVED | Noted: 2017-05-24 | Resolved: 2017-07-19

## 2017-07-19 PROBLEM — H51.22 INTERNUCLEAR OPHTHALMOPLEGIA OF LEFT EYE: Status: RESOLVED | Noted: 2017-05-13 | Resolved: 2017-07-19

## 2017-07-19 PROBLEM — R20.0 LEFT FACIAL NUMBNESS: Status: RESOLVED | Noted: 2017-03-22 | Resolved: 2017-07-19

## 2017-07-19 PROBLEM — R55 SYNCOPE: Status: RESOLVED | Noted: 2017-05-23 | Resolved: 2017-07-19

## 2017-07-19 PROBLEM — H53.2 DIPLOPIA: Status: RESOLVED | Noted: 2017-05-24 | Resolved: 2017-07-19

## 2017-07-19 PROBLEM — K92.2 UPPER GI BLEED: Status: RESOLVED | Noted: 2017-05-28 | Resolved: 2017-07-19

## 2017-07-19 PROCEDURE — 99214 OFFICE O/P EST MOD 30 MIN: CPT | Mod: S$PBB,,, | Performed by: PSYCHIATRY & NEUROLOGY

## 2017-07-19 PROCEDURE — 99999 PR PBB SHADOW E&M-EST. PATIENT-LVL IV: CPT | Mod: PBBFAC,,, | Performed by: PSYCHIATRY & NEUROLOGY

## 2017-07-19 PROCEDURE — 99214 OFFICE O/P EST MOD 30 MIN: CPT | Mod: PBBFAC | Performed by: PSYCHIATRY & NEUROLOGY

## 2017-07-19 RX ORDER — PROCHLORPERAZINE MALEATE 10 MG
10 TABLET ORAL
Status: CANCELLED | OUTPATIENT
Start: 2017-07-19

## 2017-07-19 RX ORDER — DEXTROSE MONOHYDRATE AND SODIUM CHLORIDE 5; .45 G/100ML; G/100ML
INJECTION, SOLUTION INTRAVENOUS CONTINUOUS
Status: CANCELLED | OUTPATIENT
Start: 2017-07-19

## 2017-07-19 RX ORDER — SODIUM CHLORIDE 0.9 % (FLUSH) 0.9 %
10 SYRINGE (ML) INJECTION
Status: CANCELLED | OUTPATIENT
Start: 2017-07-19

## 2017-07-19 RX ORDER — HEPARIN 100 UNIT/ML
500 SYRINGE INTRAVENOUS
Status: CANCELLED | OUTPATIENT
Start: 2017-07-19

## 2017-07-19 NOTE — TELEPHONE ENCOUNTER
----- Message from Shana Love MD sent at 7/19/2017  4:32 PM CDT -----  S/M, kindly send me therapy plan for Cytoxan (the only protocol in the [employer] system for [place]) ;  Diagnosis is CNS vasculitis

## 2017-07-19 NOTE — ADDENDUM NOTE
Encounter addended by: Shana Love MD on: 7/19/2017  4:48 PM<BR>    Actions taken: Follow-up modified

## 2017-07-19 NOTE — PROGRESS NOTES
Patient Name: Bessy Nunez  MRN: [id number]    CC: Follow up for CNS Vasculitis     Interval History:(7/19/2017)  Since discharge he still has a balance problem. He walk from bedroom to the kitchen but fall when try to pick something from the floor. Wife reported no improvement in balance and walk.  Blurry and double vision resolved after the discharge. Per wife he still having memory problem. Sometime he does not recognized his wife and keep asking things again and again. He get confused like wife mentioned that sometime he does not follow the communication. All this happening everyday and intermittently. On other times he communicated appropriately but still very slow in understanding. He still having hard time using his iphone which he was using perfectly in the past. Denies HA, dizziness, vision changes, dysphagia, seizure or LOC.        History of Present Illness (During hospitalization June 2017): Bessy Nunez is a 57 y.o. Male with a PMHx of HTN, DM, and demyelinating disease who was admitted to the stroke service about 2 weeks ago for diplopia, unsteady gait, and JAYDEN. Demyelinating disease was discovered and patient was transferred to hospital medicine with general neurology consult. He was discharged home on steroids. Patient returned to hospital after he had a fall and was nonverbal. Patient has been having intermittent aphasia, inattention, and delayed response. EEG was negative for seizures and labs WNL. LP from previous admission did not show evidence of MS. Repeat MRI revealed new lesions with one lesion in R frontal lobe concerning for stroke. Stroke team was consulted for further input.     Hospital Course (synopsis of major diagnoses, care, treatment, and services provided during the course of the hospital stay): 58 y/o male with possible MS treated with steroids on last admission. Patient has been having intermittent aphasia, inattention, and delayed response. Repeat MRI revealed new lesions with  one lesion in R frontal lobe concerning for stroke but very degrade by motion. Patient with no real focal neuro deficits, moving all extremities, flat affect, follows commands but has inattention and delayed response. Symptoms are waxing and waning.      5/29: Repeated MRI is limited 2/2 motion artifact, revealing multiple scattered foci suggesting demyelination vs stroke. Given recent MRI there suspicion of cardio-embolic phenomenon. Plan for GIOVANNI after EGD given h/o melena and drop in H/H.         5/30: EGD done which revealed duodenal and gastric ulcer treated with cautery. Bedside Echo WNL. Bcx NGTD. Cardiology postponed GIOVANNI given multiple ulcers seen during EGD.  Awaiting spine MRI      6/2: Cardiology deferred GIOVANNI to be done in the out patient settings since endocarditis is low in DDX. Neurology are suspecting vasculitis, w/u is underway. Cerebral angio revealing multifocal stenosis mainly in the anterior circulation that is consisting with vasculitis or multifocal atherosclerosis. 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 Pt is off steroid        6/3: Patient was given a dose of cytoxan for CNS vasculitis. Will need a dose once per month for three months. MRA brain consistent with CNS vasculitis. Still has urinary and fecal incontinence- neuro has no plans for this and believes this is secondary to infarct.      6/4: Patient discharging home with home health. Will be started on maintenance prednisone 60 mg and will need follow up with Dr. Love in 2-3 weeks following discharge.     ROS:   Constitutional: Negative for malaise/fatigue. Negative for weight loss.   HENT: Negative for hearing loss.   Eyes: Negative for blurred vision and double vision.   Respiratory: Negative for shortness of breath and stridor.   Cardiovascular: Negative for chest pain and palpitations.   Gastrointestinal: Negative for nausea, vomiting and constipation.    Genitourinary: Negative for frequency. Negative for urgency.   Musculoskeletal: Negative for joint pain. Negative for myalgias and falls.   Skin: Negative for rash.   Neurological: Negative for dizziness and tremors. Negative for focal weakness and seizures.   Endo/Heme/Allergies: Does not bruise/bleed easily.   Psychiatric/Behavioral:. Negative for depression and hallucinations. The patient is not nervous/anxious.    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    Mixed hyperlipidemia 11/9/2012    NAFLD (nonalcoholic fatty liver disease) 10/11/2013    CHASE (nonalcoholic steatohepatitis)     Obesity     Stroke     Type 2 diabetes mellitus with diabetic polyneuropathy, with long-term current use of insulin 5/14/2017       Medications    Current Outpatient Prescriptions:     aspirin (ECOTRIN) 81 MG EC tablet, Take 81 mg by mouth once daily., Disp: , Rfl:     atenolol (TENORMIN) 50 MG tablet, Take 1 tablet (50 mg total) by mouth once daily., Disp: 90 tablet, Rfl: 3    atorvastatin (LIPITOR) 40 MG tablet, Take 1 tablet (40 mg total) by mouth once daily., Disp: 90 tablet, Rfl: 3    diltiaZEM (DILACOR XR) 240 MG CDCR, Take 1 capsule (240 mg total) by mouth once daily., Disp: 90 capsule, Rfl: 3    insulin degludec (TRESIBA FLEXTOUCH U-200) 200 unit/mL (3 mL) InPn, Inject 15 Units into the skin every evening., Disp: 3 Syringe, Rfl: 1    levetiracetam (KEPPRA) 500 MG Tab, Take 1 tablet (500 mg total) by mouth 2 (two) times daily., Disp: 60 tablet, Rfl: 2    liraglutide 0.6 mg/0.1 mL, 18 mg/3 mL, subq PNIJ (VICTOZA 3-TOMASZ) 0.6 mg/0.1 mL (18 mg/3 mL) PnIj, Inject 1.8 mg into the skin once daily., Disp: 9 mL, Rfl: 11    metformin (GLUCOPHAGE-XR) 500 MG 24 hr tablet, Take 2 tablets (1,000 mg total) by mouth 2 (two) times daily with meals., Disp: 360 tablet, Rfl: 0    omega-3 fatty acids-vitamin E (FISH OIL)  "1,000 mg Cap, Take 1 capsule by mouth 2 (two) times daily., Disp: , Rfl: 0    ranitidine (ZANTAC) 150 MG tablet, Take 1 tablet (150 mg total) by mouth 2 (two) times daily., Disp: 60 tablet, Rfl: 11    sertraline (ZOLOFT) 100 MG tablet, 1 and half tablet daily (Patient taking differently: Take 150 mg by mouth once daily. ), Disp: 135 tablet, Rfl: 3    valsartan (DIOVAN) 320 MG tablet, Take 1 tablet (320 mg total) by mouth once daily., Disp: 90 tablet, Rfl: 3    vitamin D 1000 units Tab, Take 5 tablets (5,000 Units total) by mouth once daily., Disp: , Rfl:     insulin needles, disposable, (Neocutis ULTRA-FINE ANA PEN NEEDLES) 32 x 5/32 " Ndle, Inject twice daily, Disp: 100 each, Rfl: 3    Allergies  Review of patient's allergies indicates:  No Known Allergies    Social History  Social History     Social History    Marital status:      Spouse name: N/A    Number of children: N/A    Years of education: N/A     Occupational History    unemployed      Social History Main Topics    Smoking status: Never Smoker    Smokeless tobacco: Never Used    Alcohol use No    Drug use: No    Sexual activity: Not on file     Other Topics Concern    Not on file     Social History Narrative    No narrative on file       Family History  Family History   Problem Relation Age of Onset    Heart disease Mother     Hypertension Mother     Heart failure Mother     Cancer Father      lung    Diabetes Maternal Aunt        Physical Exam  /70   Pulse 83   Ht 5' 10" (1.778 m)   Wt 95.3 kg (210 lb)   BMI 30.13 kg/m²     General appearance: Well-developed, well-groomed.     Neurologic Exam: The patient is awake, alert and oriented. Language is fluent. Recent and remote memory are normal. Registration 3/3, recall 2/3.      Cranial nerves: pupils are round and reactive to light and accommodation. Visual fields are full to confrontation. Fundoscopic exam reveals sharp discs bilaterally, with good venous pulsations. Ocular " motility is full in all cardinal positions of gaze. Facial sensation is normal to pinprick and light touch. Corneal reflexes are present bilaterally. Facial activation is symmetric. Hearing is normal bilaterally. Palate elevates symmetrically and gag reflex is intact bilaterally. Shoulder elevation is symmetric and full strength bilaterally. Tongue is midline and neck rotation strength is normal bilaterally. Neck range of motion is normal.     Motor examination of all extremities demonstrates normal bulk and tone in all four limbs. There are no atrophy or fasciculations. Strength is 5/5 in the upper and lower extremities bilaterally without pronator drift.     Sensory examination is normal to pinprick, vibration and proprioception in the upper and lower extremities bilaterally. Romberg is negative.    Deep tendon reflexes are 2+ and symmetric in the upper and lower extremities bilaterally. Toes are mute bilaterally.     Gait: Normal heel, toe, tandem, and casual gait.    Coordination: Finger to nose and heel to shin testing is normal in both upper and lower extremities. Rapid alternating movements are normal in both upper and lower extremities.     General exam  Cardiovascular: regular rate and rhythm with no murmurs, rubs or gallops. There are no carotid or vertebral artery bruits. Pulses in both upper and lower extremities are symmetric. There is no peripheral edema.   Head and neck: no cervical lymphadenopathy      Lab and Test Results    WBC   Date Value Ref Range Status   06/07/2017 6.11 3.90 - 12.70 K/uL Final   06/04/2017 4.61 3.90 - 12.70 K/uL Final   06/03/2017 5.39 3.90 - 12.70 K/uL Final   06/03/2017 5.39 3.90 - 12.70 K/uL Final   06/03/2017 5.39 3.90 - 12.70 K/uL Final     Hemoglobin   Date Value Ref Range Status   06/07/2017 10.3 (L) 14.0 - 18.0 g/dL Final   06/04/2017 9.1 (L) 14.0 - 18.0 g/dL Final   06/03/2017 8.5 (L) 14.0 - 18.0 g/dL Final   06/03/2017 8.5 (L) 14.0 - 18.0 g/dL Final   06/03/2017 8.5  (L) 14.0 - 18.0 g/dL Final     Hematocrit   Date Value Ref Range Status   06/07/2017 29.7 (L) 40.0 - 54.0 % Final   06/04/2017 26.1 (L) 40.0 - 54.0 % Final   06/03/2017 25.2 (L) 40.0 - 54.0 % Final   06/03/2017 25.2 (L) 40.0 - 54.0 % Final   06/03/2017 25.2 (L) 40.0 - 54.0 % Final     Platelets   Date Value Ref Range Status   06/07/2017 297 150 - 350 K/uL Final   06/04/2017 183 150 - 350 K/uL Final   06/03/2017 164 150 - 350 K/uL Final   06/03/2017 164 150 - 350 K/uL Final   06/03/2017 164 150 - 350 K/uL Final     Glucose   Date Value Ref Range Status   06/07/2017 286 (H) 70 - 110 mg/dL Final   06/04/2017 103 70 - 110 mg/dL Final   06/03/2017 299 (H) 70 - 110 mg/dL Final     Sodium   Date Value Ref Range Status   06/07/2017 138 136 - 145 mmol/L Final   06/04/2017 142 136 - 145 mmol/L Final   06/03/2017 141 136 - 145 mmol/L Final     Potassium   Date Value Ref Range Status   06/07/2017 4.0 3.5 - 5.1 mmol/L Final   06/04/2017 3.2 (L) 3.5 - 5.1 mmol/L Final   06/03/2017 4.0 3.5 - 5.1 mmol/L Final     Chloride   Date Value Ref Range Status   06/07/2017 103 95 - 110 mmol/L Final   06/04/2017 108 95 - 110 mmol/L Final   06/03/2017 111 (H) 95 - 110 mmol/L Final     CO2   Date Value Ref Range Status   06/07/2017 23 23 - 29 mmol/L Final   06/04/2017 26 23 - 29 mmol/L Final   06/03/2017 20 (L) 23 - 29 mmol/L Final     BUN, Bld   Date Value Ref Range Status   06/07/2017 23 (H) 6 - 20 mg/dL Final   06/04/2017 7 6 - 20 mg/dL Final   06/03/2017 9 6 - 20 mg/dL Final     Creatinine   Date Value Ref Range Status   06/07/2017 1.1 0.5 - 1.4 mg/dL Final   06/04/2017 0.8 0.5 - 1.4 mg/dL Final   06/03/2017 1.0 0.5 - 1.4 mg/dL Final     Calcium   Date Value Ref Range Status   06/07/2017 9.4 8.7 - 10.5 mg/dL Final   06/04/2017 8.4 (L) 8.7 - 10.5 mg/dL Final   06/03/2017 7.8 (L) 8.7 - 10.5 mg/dL Final     Magnesium   Date Value Ref Range Status   06/04/2017 1.4 (L) 1.6 - 2.6 mg/dL Final   06/03/2017 1.4 (L) 1.6 - 2.6 mg/dL Final    06/02/2017 1.7 1.6 - 2.6 mg/dL Final     Phosphorus   Date Value Ref Range Status   06/04/2017 3.4 2.7 - 4.5 mg/dL Final   06/03/2017 2.9 2.7 - 4.5 mg/dL Final   06/02/2017 2.7 2.7 - 4.5 mg/dL Final     Alkaline Phosphatase   Date Value Ref Range Status   06/07/2017 78 55 - 135 U/L Final   06/04/2017 81 55 - 135 U/L Final   06/03/2017 78 55 - 135 U/L Final     ALT   Date Value Ref Range Status   06/07/2017 24 10 - 44 U/L Final   06/04/2017 17 10 - 44 U/L Final   06/03/2017 18 10 - 44 U/L Final     AST   Date Value Ref Range Status   06/07/2017 34 10 - 40 U/L Final   06/04/2017 15 10 - 40 U/L Final   06/03/2017 16 10 - 40 U/L Final         Images:   MRI Brain in May 2017:  Small to punctate-sized scattered foci of signal abnormality cerebral hemispheres, left cerebellum and brainstem similarly seen on the prior allowing for motion limitation.  This is superimposed on confluent regions of T2 flair signal abnormality supratentorial white matter and jose in light of corpus callosal involvement most suggestive for areas of recent or active demyelination superimposed on prior demyelination.  However alternative differential including areas of infarction with remote lacunar-type infarcts and age advanced chronic microvascular ischemic changes not excluded.  There is a small focus enhancement in the left dorsal jose differential to include but not limited to active demyelination versus subacute infarction.  Allowing for motion compared to prior there is no significant interval change.  No hydrocephalus or midline shift.    MRA Brain: June 2017:  Multifocal areas of intracranial arterial vascular narrowing as described above. Subtle wall thickening with mild postcontrast enhancement favor vasculitis over atherosclerosis. RCVS and dissection essentially excluded. Clinical correlation, with laboratory and CSF analysis suggested.    Angio June 2017:  Multifocal areas of stenosis involving the intracranial anterior and  posterior circulation most notably in the left A2 segment and the superior cerebellar arteries as detailed above. These have an appearance consistent with cerebral vasculitis.  However, multifocal atherosclerotic disease can have a very similar appearance.  Repeat angiography may be useful following a period of treatment if vasculitis is a clinical consideration.      Other Tests     Assessment and Plan    Bessy Nunez is a 57 y.o. male presented to my clinic for the follow up after recent discharge from the hospital with a diagnosis of CNS Vasculitis.     -- Received cytoxan on June 3rd 2017  -- Will arrange for the next dose. Consent signed today.  -- Will decrease his prednisone from 60 mg to 40 mg daily. Her wife is not sure if he is taking prednisone. I will call her today evening and clear this confusion.  -- Referral for Neuro psych testing   -- CBC and CMP today     -- Case discussed with Dr Love   -- Follow up after 6 weeks with Dr Ivan Landaverde MD  Neurology Resident   [facility] [address] [place] [address]  Pager: [phone]

## 2017-07-19 NOTE — Clinical Note
S/M, kindly send me therapy plan for Cytoxan (the only protocol in the [facility] system for MS) ;  Diagnosis is CNS vasculitis

## 2017-07-20 ENCOUNTER — CLINICAL SUPPORT (OUTPATIENT)
Dept: REHABILITATION | Facility: HOSPITAL | Age: 57
End: 2017-07-20
Attending: INTERNAL MEDICINE
Payer: MEDICAID

## 2017-07-20 DIAGNOSIS — Z91.81 HISTORY OF RECENT FALL: ICD-10-CM

## 2017-07-20 DIAGNOSIS — Z74.09 IMPAIRED FUNCTIONAL MOBILITY, BALANCE, GAIT, AND ENDURANCE: ICD-10-CM

## 2017-07-20 PROCEDURE — 97112 NEUROMUSCULAR REEDUCATION: CPT

## 2017-07-20 NOTE — PROGRESS NOTES
"Name: Bessy Nunez  Clinic Number: [id number]  Date of Treatment: 7/20/2017  Diagnosis:     ICD-10-CM ICD-9-CM    1. Impaired functional mobility, balance, gait, and endurance Z74.09 V49.89    2. History of recent fall Z91.81 V15.88      Evaluation Date: 6/14/17  Visit #: 7  Plan of care expiration: 8/9/17  Precautions: universal, fall    Subjective:  Pt reports he has been having a HA for the past 1/2 hour..       Objective: Pt cont to amb into clinic w/ shuffling gait pattern     Treatment:  Bessy Nunez was educated and performed therapeutic exercises to develop  strength, endurance, improved balance, and gait stability including:     Patient participated in neuromuscular re-education activities to improve: Balance, Coordination and Posture for 30 min minutes. The following activities were included:   3 x 30" tandem stance with CGA    4 laps forward tandem ambulation in // bars; SBA; 1 UE support <> no UE support  NP  High step amb in // bars 5 x 10 ft UE assist   Lateral step outs with UEs extended  X 20 reps  Forward step outs with patient sliding contralateral UE along parallel bar to increase step length Np  abd walking 6 x 30 ft with CGA   Retro walking 6 x 30 ft with CGA   Cone hesitation walk 4 cone 6 laps parallel bars SBA NP  March on foam SBA 90 sec x 2 trials  Heel taps over cone CGA 2 x 10 B   Amb around clinic 2 x 165 ft w/ VCs for heel-toe walking  Static standing on foam, no UE support 3 x 30" with SBA   Cone taps 2 x 10 reps  Forward chest press c/ yellow weighted ball, x 30" with SBA   Forward shoulder flexion/extension c/ yellow weighted ball, x 30" with SBA  LTR c/ yellow weighted ball x 30" with SBA        Written Home Exercises:   No HEP given today     Assessment:   Increased stability with balance exercises requiring less A from PT.    Pt will continue to benefit from skilled PT intervention. Medical Necessity is demonstrated by:  Fall Risk, Unable to participate fully in daily activities, " Requires skilled supervision to complete and progress HEP and Weakness.    Patient is making good progress towards established goals.    New/Revised goals: continue with current goals  GOALS:   Short term goals: 4 weeks, pt agrees to goals set.  4. Pt will be (I) with established HEP.   5. Pt will improve LLE SLS to at least 6 seconds to improve balance and decrease risk of fall.   6. Pt will improve RLE SLS to at least 4 seconds to improve balance and decrease risk of fall.   7. Pt will improve 30 second chair rise score to at least 6 times to improve safety and endurance with functional mobility.   8. Pt will improve SSWS to at least 0.70 m/s to improve safety with community ambulation and to decrease fall risk.   9. Pt will improve FGA score to at least 22/30 to improve safety with functional mobility and decrease risk of fall.      Long term goals: 8 weeks, pt agrees to goals set  10. Pt will improve LLE SLS to at least 8 seconds to improve balance and decrease risk of fall.   11. Pt will improve RLE SLS to at least 6 seconds to improve balance and decrease risk of fall.   12. Pt will improve 30 second chair rise score to at least 7 times to improve safety and endurance with functional mobility.   13. Pt will improve SSWS to at least 0.90 m/s to improve safety with community ambulation and to decrease fall risk.   14. Pt will improve FGA score to at least 25/30 to improve safety with functional mobility and decrease risk of fall.   15. Pt will improve FOTO score to at least status 66% (limitation 34%) to improve self perception of functional mobility.      Plan:  Continue with established Plan of Care towards PT goals.

## 2017-07-25 ENCOUNTER — TELEPHONE (OUTPATIENT)
Dept: NEUROLOGY | Facility: CLINIC | Age: 57
End: 2017-07-25

## 2017-07-25 RX ORDER — PREDNISONE 20 MG/1
20 TABLET ORAL
COMMUNITY
Start: 2017-07-10 | End: 2017-07-25 | Stop reason: SDUPTHER

## 2017-07-26 ENCOUNTER — TELEPHONE (OUTPATIENT)
Dept: SLEEP MEDICINE | Facility: OTHER | Age: 57
End: 2017-07-26

## 2017-07-27 ENCOUNTER — CLINICAL SUPPORT (OUTPATIENT)
Dept: REHABILITATION | Facility: HOSPITAL | Age: 57
End: 2017-07-27
Attending: INTERNAL MEDICINE
Payer: MEDICAID

## 2017-07-27 DIAGNOSIS — Z74.09 IMPAIRED FUNCTIONAL MOBILITY, BALANCE, GAIT, AND ENDURANCE: ICD-10-CM

## 2017-07-27 DIAGNOSIS — Z91.81 HISTORY OF RECENT FALL: ICD-10-CM

## 2017-07-27 PROCEDURE — 97112 NEUROMUSCULAR REEDUCATION: CPT

## 2017-07-27 RX ORDER — PREDNISONE 20 MG/1
20 TABLET ORAL DAILY
Qty: 30 TABLET | Refills: 0 | Status: SHIPPED | OUTPATIENT
Start: 2017-07-27 | End: 2017-08-13 | Stop reason: SDUPTHER

## 2017-07-27 NOTE — PROGRESS NOTES
"Name: Bessy Nunez  Clinic Number: [id number]  Date of Treatment: 7/27/2017  Diagnosis:     ICD-10-CM ICD-9-CM    1. Impaired functional mobility, balance, gait, and endurance Z74.09 V49.89    2. History of recent fall Z91.81 V15.88      Evaluation Date: 6/14/17  Visit #: 8  Plan of care expiration: 8/9/17  Precautions: universal, fall    Subjective:  Pt     Objective: Pt cont to amb into clinic w/ shuffling gait pattern     Treatment:  Bessy Nunez was educated and performed therapeutic exercises to develop  strength, endurance, improved balance, and gait stability including:     Patient participated in neuromuscular re-education activities to improve: Balance, Coordination and Posture for 45 minutes. The following activities were included:     3 x 30" tandem stance with SBA    4 laps forward tandem ambulation in // bars; SBA; 1 UE support <> no UE support  NP  High step amb in // bars 5 x 10 ft UE assist   Lateral step outs with UEs extended x 20 reps  Forward step outs with patient sliding contralateral UE along parallel bar to increase step length Np  abd walking 6 x 30 ft with CGA   Retro walking 6 x 30 ft with CGA   March on foam SBA 90 sec x 2 trials  Heel taps over cone CGA 2 x 10 B   Static standing on foam, no UE support 3 x 30" with SBA   Cone taps 2 x 10 reps  Forward chest press c/ yellow weighted ball, x 30" with SBA   Forward shoulder flexion/extension c/ yellow weighted ball, x 30" with SBA  LTR c/ yellow weighted ball x 30" with SBA        Written Home Exercises:   No HEP given today     Assessment:   LOB noted today when patient turned around during retro walking    Pt will continue to benefit from skilled PT intervention. Medical Necessity is demonstrated by:  Fall Risk, Unable to participate fully in daily activities, Requires skilled supervision to complete and progress HEP and Weakness.    Patient is making good progress towards established goals.    New/Revised goals: continue with current " goals  GOALS:   Short term goals: 4 weeks, pt agrees to goals set.  4. Pt will be (I) with established HEP.   5. Pt will improve LLE SLS to at least 6 seconds to improve balance and decrease risk of fall.   6. Pt will improve RLE SLS to at least 4 seconds to improve balance and decrease risk of fall.   7. Pt will improve 30 second chair rise score to at least 6 times to improve safety and endurance with functional mobility.   8. Pt will improve SSWS to at least 0.70 m/s to improve safety with community ambulation and to decrease fall risk.   9. Pt will improve FGA score to at least 22/30 to improve safety with functional mobility and decrease risk of fall.      Long term goals: 8 weeks, pt agrees to goals set  10. Pt will improve LLE SLS to at least 8 seconds to improve balance and decrease risk of fall.   11. Pt will improve RLE SLS to at least 6 seconds to improve balance and decrease risk of fall.   12. Pt will improve 30 second chair rise score to at least 7 times to improve safety and endurance with functional mobility.   13. Pt will improve SSWS to at least 0.90 m/s to improve safety with community ambulation and to decrease fall risk.   14. Pt will improve FGA score to at least 25/30 to improve safety with functional mobility and decrease risk of fall.   15. Pt will improve FOTO score to at least status 66% (limitation 34%) to improve self perception of functional mobility.      Plan:  Continue with established Plan of Care towards PT goals.

## 2017-07-29 ENCOUNTER — HOSPITAL ENCOUNTER (OUTPATIENT)
Dept: SLEEP MEDICINE | Facility: OTHER | Age: 57
Discharge: HOME | End: 2017-07-29
Attending: INTERNAL MEDICINE
Payer: MEDICAID

## 2017-07-29 DIAGNOSIS — G47.33 OSA (OBSTRUCTIVE SLEEP APNEA): ICD-10-CM

## 2017-07-29 PROCEDURE — 95810 POLYSOM 6/> YRS 4/> PARAM: CPT | Mod: 26,,, | Performed by: INTERNAL MEDICINE

## 2017-07-29 PROCEDURE — 95810 POLYSOM 6/> YRS 4/> PARAM: CPT

## 2017-08-02 ENCOUNTER — CLINICAL SUPPORT (OUTPATIENT)
Dept: REHABILITATION | Facility: HOSPITAL | Age: 57
End: 2017-08-02
Attending: INTERNAL MEDICINE
Payer: MEDICAID

## 2017-08-02 DIAGNOSIS — Z74.09 IMPAIRED FUNCTIONAL MOBILITY, BALANCE, GAIT, AND ENDURANCE: ICD-10-CM

## 2017-08-02 DIAGNOSIS — Z91.81 HISTORY OF RECENT FALL: ICD-10-CM

## 2017-08-02 PROCEDURE — 97112 NEUROMUSCULAR REEDUCATION: CPT

## 2017-08-02 NOTE — PROGRESS NOTES
"Name: Bessy Nunez  Clinic Number: [id number]  Date of Treatment: 8/2/2017  Diagnosis:     ICD-10-CM ICD-9-CM    1. Impaired functional mobility, balance, gait, and endurance Z74.09 V49.89    2. History of recent fall Z91.81 V15.88      Evaluation Date: 6/14/17  Visit #: 9  Plan of care expiration: 8/9/17  Precautions: universal, fall    Subjective:       Objective: Pt cont to amb into clinic w/ shuffling gait pattern     Treatment:  Bessy Nunez was educated and performed therapeutic exercises to develop  strength, endurance, improved balance, and gait stability including:     Patient participated in neuromuscular re-education activities to improve: Balance, Coordination and Posture for 45 total minutes. The following activities were included:     One on one ther ex x 25 minutes    3 x 30" tandem stance with SBA    4 laps forward tandem ambulation in // bars; SBA  High step amb in // bars 5 x 10 ft UE assist   Lateral step outs with UEs extended x 20 reps  Forward step outs with patient sliding contralateral UE along parallel bar to increase step length x 20 reps  abd walking 6 x 30 ft with CGA   Retro walking 6 x 30 ft with CGA   March on foam SBA 2 x 90"   Static standing on foam, no UE support 3 x 30" with SBA   Cone taps 2 x 10 reps  Forward chest press c/ yellow weighted ball, 3 x 30" with SBA   Forward shoulder flexion/extension c/ yellow weighted ball, 3 x 30" with SBA  LTR c/ yellow weighted ball 3 x 30" with SBA        Written Home Exercises:   No HEP given today     Assessment:     Pt will continue to benefit from skilled PT intervention. Medical Necessity is demonstrated by:  Fall Risk, Unable to participate fully in daily activities, Requires skilled supervision to complete and progress HEP and Weakness.    Patient is making good progress towards established goals.    New/Revised goals: continue with current goals  GOALS:   Short term goals: 4 weeks, pt agrees to goals set.  4. Pt will be (I) with " established HEP.   5. Pt will improve LLE SLS to at least 6 seconds to improve balance and decrease risk of fall.   6. Pt will improve RLE SLS to at least 4 seconds to improve balance and decrease risk of fall.   7. Pt will improve 30 second chair rise score to at least 6 times to improve safety and endurance with functional mobility.   8. Pt will improve SSWS to at least 0.70 m/s to improve safety with community ambulation and to decrease fall risk.   9. Pt will improve FGA score to at least 22/30 to improve safety with functional mobility and decrease risk of fall.      Long term goals: 8 weeks, pt agrees to goals set  10. Pt will improve LLE SLS to at least 8 seconds to improve balance and decrease risk of fall.   11. Pt will improve RLE SLS to at least 6 seconds to improve balance and decrease risk of fall.   12. Pt will improve 30 second chair rise score to at least 7 times to improve safety and endurance with functional mobility.   13. Pt will improve SSWS to at least 0.90 m/s to improve safety with community ambulation and to decrease fall risk.   14. Pt will improve FGA score to at least 25/30 to improve safety with functional mobility and decrease risk of fall.   15. Pt will improve FOTO score to at least status 66% (limitation 34%) to improve self perception of functional mobility.      Plan:  Continue with established Plan of Care towards PT goals.

## 2017-08-04 ENCOUNTER — CLINICAL SUPPORT (OUTPATIENT)
Dept: REHABILITATION | Facility: HOSPITAL | Age: 57
End: 2017-08-04
Attending: INTERNAL MEDICINE
Payer: MEDICAID

## 2017-08-04 DIAGNOSIS — Z74.09 IMPAIRED FUNCTIONAL MOBILITY, BALANCE, GAIT, AND ENDURANCE: ICD-10-CM

## 2017-08-04 DIAGNOSIS — Z91.81 HISTORY OF RECENT FALL: ICD-10-CM

## 2017-08-04 PROCEDURE — G8978 MOBILITY CURRENT STATUS: HCPCS | Mod: CL

## 2017-08-04 PROCEDURE — 97110 THERAPEUTIC EXERCISES: CPT

## 2017-08-04 PROCEDURE — G8979 MOBILITY GOAL STATUS: HCPCS | Mod: CJ

## 2017-08-04 NOTE — PROGRESS NOTES
"Name: Bessy Nunez  Clinic Number: [id number]  Date of Treatment: 8/4/2017  Diagnosis:     ICD-10-CM ICD-9-CM    1. Impaired functional mobility, balance, gait, and endurance Z74.09 V49.89    2. History of recent fall Z91.81 V15.88      Evaluation Date: 6/14/17  Visit #: 10  Plan of care expiration: 8/9/17  Precautions: universal, fall    Subjective:   No c/o pain this afternoon.    Objective:    Treatment:  Bessy Nunez was educated and performed therapeutic exercises to develop  strength, endurance, improved balance, and gait stability including:     Patient participated in neuromuscular re-education activities to improve: Balance, Coordination and Posture for 50 total minutes. The following activities were included:       3 x 30" tandem stance with SBA    4 laps forward tandem ambulation in // bars; SBA  High step amb in // bars 5 x 10 ft UE assist   Lateral step outs with UEs extended x 20 reps  Forward step outs with patient sliding contralateral UE along parallel bar to increase step length x 20 reps  abd walking 6 x 30 ft with CGA   Retro walking 6 x 30 ft with CGA   March on foam SBA 2 x 90"   Static standing eyes closed, 3 x 30" with SBA   Static standing on foam, eyes closed 3 x 30" with CGA  Cone taps 2 x 10 reps  Forward chest press c/ yellow weighted ball, 3 x 30" with SBA   Forward shoulder flexion/extension c/ yellow weighted ball, 3 x 30" with SBA  LTR c/ yellow weighted ball 3 x 30" with SBA  SLS 3 x 10" with CGA      Functional Limitations  Reports - G Codes    Category:  Mobility   Tool:  FOTO Outcomes Measurement System.   Score:  Patient scored out 33 of 100 on the FOTO Outcomes Measurement System.   Current:   CL at least 60% < 80% impaired, limited or restricted   Goal:   CJ at least 20% < 40% impaired, limited or restricted         Written Home Exercises:   No HEP given today     Assessment:   Initiated single leg activities and eyes closed activities.  Pt will continue to benefit " from skilled PT intervention. Medical Necessity is demonstrated by:  Fall Risk, Unable to participate fully in daily activities, Requires skilled supervision to complete and progress HEP and Weakness.    Patient is making good progress towards established goals.    New/Revised goals: continue with current goals  GOALS:   Short term goals: 4 weeks, pt agrees to goals set.  4. Pt will be (I) with established HEP.   5. Pt will improve LLE SLS to at least 6 seconds to improve balance and decrease risk of fall.   6. Pt will improve RLE SLS to at least 4 seconds to improve balance and decrease risk of fall.   7. Pt will improve 30 second chair rise score to at least 6 times to improve safety and endurance with functional mobility.   8. Pt will improve SSWS to at least 0.70 m/s to improve safety with community ambulation and to decrease fall risk.   9. Pt will improve FGA score to at least 22/30 to improve safety with functional mobility and decrease risk of fall.      Long term goals: 8 weeks, pt agrees to goals set  10. Pt will improve LLE SLS to at least 8 seconds to improve balance and decrease risk of fall.   11. Pt will improve RLE SLS to at least 6 seconds to improve balance and decrease risk of fall.   12. Pt will improve 30 second chair rise score to at least 7 times to improve safety and endurance with functional mobility.   13. Pt will improve SSWS to at least 0.90 m/s to improve safety with community ambulation and to decrease fall risk.   14. Pt will improve FGA score to at least 25/30 to improve safety with functional mobility and decrease risk of fall.   15. Pt will improve FOTO score to at least status 66% (limitation 34%) to improve self perception of functional mobility.      Plan:  Continue with established Plan of Care towards PT goals.

## 2017-08-11 ENCOUNTER — CLINICAL SUPPORT (OUTPATIENT)
Dept: REHABILITATION | Facility: HOSPITAL | Age: 57
End: 2017-08-11
Attending: INTERNAL MEDICINE
Payer: MEDICAID

## 2017-08-11 ENCOUNTER — PATIENT MESSAGE (OUTPATIENT)
Dept: NEUROLOGY | Facility: CLINIC | Age: 57
End: 2017-08-11

## 2017-08-11 DIAGNOSIS — Z91.81 HISTORY OF RECENT FALL: ICD-10-CM

## 2017-08-11 DIAGNOSIS — Z74.09 IMPAIRED FUNCTIONAL MOBILITY, BALANCE, GAIT, AND ENDURANCE: ICD-10-CM

## 2017-08-11 PROCEDURE — 97110 THERAPEUTIC EXERCISES: CPT

## 2017-08-11 NOTE — TELEPHONE ENCOUNTER
Call placed to Bekah (wife). Pt is scheduled for Citoxan at chemo infusion suite on Tuesday at 1pm. She is aware and agreeable. We also discussed the regimen: infuse every 28 days, labs needed at day 12 and 27 post infusion every month. Verbalizes understanding of all. Will call pt's wife on Wednesday to check in and to schedule labs.

## 2017-08-11 NOTE — PROGRESS NOTES
"Name: Bessy Nunez  Clinic Number: [id number]  Date of Treatment: 8/11/2017  Diagnosis:     ICD-10-CM ICD-9-CM    1. Impaired functional mobility, balance, gait, and endurance Z74.09 V49.89    2. History of recent fall Z91.81 V15.88      Evaluation Date: 6/14/17  Visit #: 11  Plan of care expiration: 8/9/17  Precautions: universal, fall    Subjective: Patient denied pain this afternoon. Patient's wife reports patient is to start chemotherapy next week. Reports he fell earlier in the week.    Objective:    Treatment:  Bessy Nunez was educated and performed therapeutic exercises to develop  strength, endurance, improved balance, and gait stability including:     Patient participated in neuromuscular re-education activities to improve: Balance, Coordination and Posture for 45 total minutes. The following activities were included:     Ther ex x 25 minutes    3 x 30" tandem stance with SBA    4 laps forward tandem ambulation in // bars; SBA  High step amb in // bars 5 x 10 ft UE assist   Lateral step outs with UEs extended x 20 reps  Forward step outs with patient sliding contralateral UE along parallel bar to increase step length x 20 reps  abd walking 6 x 30 ft with CGA   Retro walking 4 x 30 ft with min A  March on foam SBA 2 x 90"   Static standing eyes closed, 3 x 30" with SBA   Static standing on foam, eyes closed 3 x 30" with CGA  Cone taps 2 x 10 reps  Forward chest press c/ yellow weighted ball, 3 x 30" with SBA   Forward shoulder flexion/extension c/ yellow weighted ball, 3 x 30" with SBA  LTR c/ yellow weighted ball 3 x 30" with SBA  SLS 3 x 10" with CGA      Written Home Exercises:   No HEP given today     Assessment:   Increased A given to pt today during retro walking.  Pt will continue to benefit from skilled PT intervention. Medical Necessity is demonstrated by:  Fall Risk, Unable to participate fully in daily activities, Requires skilled supervision to complete and progress HEP and Weakness.    Patient " is making good progress towards established goals.    New/Revised goals: continue with current goals  GOALS:   Short term goals: 4 weeks, pt agrees to goals set.  4. Pt will be (I) with established HEP.   5. Pt will improve LLE SLS to at least 6 seconds to improve balance and decrease risk of fall.   6. Pt will improve RLE SLS to at least 4 seconds to improve balance and decrease risk of fall.   7. Pt will improve 30 second chair rise score to at least 6 times to improve safety and endurance with functional mobility.   8. Pt will improve SSWS to at least 0.70 m/s to improve safety with community ambulation and to decrease fall risk.   9. Pt will improve FGA score to at least 22/30 to improve safety with functional mobility and decrease risk of fall.      Long term goals: 8 weeks, pt agrees to goals set  10. Pt will improve LLE SLS to at least 8 seconds to improve balance and decrease risk of fall.   11. Pt will improve RLE SLS to at least 6 seconds to improve balance and decrease risk of fall.   12. Pt will improve 30 second chair rise score to at least 7 times to improve safety and endurance with functional mobility.   13. Pt will improve SSWS to at least 0.90 m/s to improve safety with community ambulation and to decrease fall risk.   14. Pt will improve FGA score to at least 25/30 to improve safety with functional mobility and decrease risk of fall.   15. Pt will improve FOTO score to at least status 66% (limitation 34%) to improve self perception of functional mobility.      Plan:  Continue with established Plan of Care towards PT goals.

## 2017-08-13 RX ORDER — PREDNISONE 20 MG/1
20 TABLET ORAL DAILY
Qty: 30 TABLET | Refills: 4 | Status: SHIPPED | OUTPATIENT
Start: 2017-09-12 | End: 2017-09-14 | Stop reason: ALTCHOICE

## 2017-08-14 NOTE — TELEPHONE ENCOUNTER
Patient has been scheduled for 9/11/2017 at 10:20 with Dr. Love.  Appointment confirmed with Bekah

## 2017-08-14 NOTE — TELEPHONE ENCOUNTER
Left VM for Bekah to give our office a call to schedule appointment prior (9/11/2017) to infusion appointment on 9/15/2017.

## 2017-08-15 ENCOUNTER — INFUSION (OUTPATIENT)
Dept: INFUSION THERAPY | Facility: HOSPITAL | Age: 57
End: 2017-08-15
Attending: PSYCHIATRY & NEUROLOGY
Payer: MEDICAID

## 2017-08-15 VITALS
BODY MASS INDEX: 28.37 KG/M2 | WEIGHT: 209.44 LBS | HEIGHT: 72 IN | RESPIRATION RATE: 16 BRPM | TEMPERATURE: 98 F | HEART RATE: 84 BPM | DIASTOLIC BLOOD PRESSURE: 65 MMHG | SYSTOLIC BLOOD PRESSURE: 116 MMHG

## 2017-08-15 DIAGNOSIS — I77.6 CNS VASCULITIS: Primary | ICD-10-CM

## 2017-08-15 PROCEDURE — 96413 CHEMO IV INFUSION 1 HR: CPT

## 2017-08-15 PROCEDURE — 96367 TX/PROPH/DG ADDL SEQ IV INF: CPT

## 2017-08-15 PROCEDURE — 96361 HYDRATE IV INFUSION ADD-ON: CPT

## 2017-08-15 PROCEDURE — 25000003 PHARM REV CODE 250: Performed by: PSYCHIATRY & NEUROLOGY

## 2017-08-15 PROCEDURE — S5010 5% DEXTROSE AND 0.45% SALINE: HCPCS | Performed by: PSYCHIATRY & NEUROLOGY

## 2017-08-15 PROCEDURE — 63600175 PHARM REV CODE 636 W HCPCS: Performed by: PSYCHIATRY & NEUROLOGY

## 2017-08-15 RX ORDER — PROCHLORPERAZINE MALEATE 10 MG
10 TABLET ORAL
Status: COMPLETED | OUTPATIENT
Start: 2017-08-15 | End: 2017-08-15

## 2017-08-15 RX ORDER — ONDANSETRON HCL IN 0.9 % NACL 8 MG/50 ML
8 INTRAVENOUS SOLUTION, PIGGYBACK (ML) INTRAVENOUS
Status: CANCELLED | OUTPATIENT
Start: 2017-08-15

## 2017-08-15 RX ORDER — SODIUM CHLORIDE 0.9 % (FLUSH) 0.9 %
10 SYRINGE (ML) INJECTION
Status: CANCELLED | OUTPATIENT
Start: 2017-08-15

## 2017-08-15 RX ORDER — DEXTROSE MONOHYDRATE AND SODIUM CHLORIDE 5; .45 G/100ML; G/100ML
INJECTION, SOLUTION INTRAVENOUS CONTINUOUS
Status: CANCELLED | OUTPATIENT
Start: 2017-08-15

## 2017-08-15 RX ORDER — DEXTROSE MONOHYDRATE AND SODIUM CHLORIDE 5; .45 G/100ML; G/100ML
INJECTION, SOLUTION INTRAVENOUS CONTINUOUS
Status: DISCONTINUED | OUTPATIENT
Start: 2017-08-15 | End: 2017-08-15 | Stop reason: HOSPADM

## 2017-08-15 RX ORDER — PROCHLORPERAZINE MALEATE 10 MG
10 TABLET ORAL
Status: CANCELLED | OUTPATIENT
Start: 2017-08-15

## 2017-08-15 RX ORDER — ONDANSETRON HCL IN 0.9 % NACL 8 MG/50 ML
8 INTRAVENOUS SOLUTION, PIGGYBACK (ML) INTRAVENOUS
Status: COMPLETED | OUTPATIENT
Start: 2017-08-15 | End: 2017-08-15

## 2017-08-15 RX ORDER — DEXTROSE MONOHYDRATE AND SODIUM CHLORIDE 5; .45 G/100ML; G/100ML
INJECTION, SOLUTION INTRAVENOUS CONTINUOUS
Status: ACTIVE | OUTPATIENT
Start: 2017-08-15 | End: 2017-08-15

## 2017-08-15 RX ORDER — HEPARIN 100 UNIT/ML
500 SYRINGE INTRAVENOUS
Status: CANCELLED | OUTPATIENT
Start: 2017-08-15

## 2017-08-15 RX ADMIN — PROCHLORPERAZINE MALEATE 10 MG: 10 TABLET, FILM COATED ORAL at 01:08

## 2017-08-15 RX ADMIN — DEXTROSE: 50 INJECTION, SOLUTION INTRAVENOUS at 02:08

## 2017-08-15 RX ADMIN — SODIUM CHLORIDE 8 MG: 9 INJECTION, SOLUTION INTRAVENOUS at 01:08

## 2017-08-15 RX ADMIN — CYCLOPHOSPHAMIDE 1760 MG: 1 INJECTION, POWDER, FOR SOLUTION INTRAVENOUS; ORAL at 03:08

## 2017-08-15 RX ADMIN — DEXTROSE AND SODIUM CHLORIDE: 5; .45 INJECTION, SOLUTION INTRAVENOUS at 01:08

## 2017-08-15 RX ADMIN — DEXTROSE AND SODIUM CHLORIDE: 5; .45 INJECTION, SOLUTION INTRAVENOUS at 03:08

## 2017-08-15 NOTE — PLAN OF CARE
Problem: Multiple Sclerosis (Adult,Obstetrics,Pediatric)  Goal: Signs and Symptoms of Listed Potential Problems Will be Absent, Minimized or Managed (Multiple Sclerosis)  Signs and symptoms of listed potential problems will be absent, minimized or managed by discharge/transition of care (reference Multiple Sclerosis (Adult,Obstetrics,Pediatric) [facility]).   Outcome: Ongoing (interventions implemented as appropriate)  Patient here for Cytoxan for multiple sclerosis.  Assessment complete and labs reviewed.  Most recent labs 7/19/17.  Spoke with Charis at Dr. Love's office; no labs needed for today.  VSS.  Educated patient and wife on Cytoxan mechanism of action, side effects, and when to seek medical attention.  Issued [employer] handout.  Chair reclined and blanket offered.  No needs expressed at this time.  Will continue to monitor.

## 2017-08-15 NOTE — PLAN OF CARE
Problem: Patient Care Overview  Goal: Plan of Care Review  Outcome: Ongoing (interventions implemented as appropriate)  Patient tolerated infusion well.  No reaction suspected.  VSS.  No questions or concerns.  AVS given to patient.  Patient left unit in WC accompanied by his wife.

## 2017-08-16 ENCOUNTER — TELEPHONE (OUTPATIENT)
Dept: NEUROLOGY | Facility: CLINIC | Age: 57
End: 2017-08-16

## 2017-08-16 ENCOUNTER — NURSE TRIAGE (OUTPATIENT)
Dept: ADMINISTRATIVE | Facility: CLINIC | Age: 57
End: 2017-08-16

## 2017-08-16 RX ORDER — INSULIN ASPART 100 [IU]/ML
INJECTION, SOLUTION INTRAVENOUS; SUBCUTANEOUS
Qty: 3 ML | Refills: 1 | Status: ON HOLD | OUTPATIENT
Start: 2017-08-16 | End: 2017-09-19

## 2017-08-16 RX ORDER — INSULIN ASPART 100 [IU]/ML
INJECTION, SOLUTION INTRAVENOUS; SUBCUTANEOUS
Qty: 3 ML | Refills: 1 | Status: SHIPPED | OUTPATIENT
Start: 2017-08-16 | End: 2017-08-16 | Stop reason: SDUPTHER

## 2017-08-16 NOTE — TELEPHONE ENCOUNTER
Informed patient of information below. Verbalized understanding. Stated, she found an old pen of medication (Tresiba). Insurance will not pay for medication. Medication was discontinued for this reason. Medication changed to (Novolog). Wife given instruction twice to make sure she was clear about medication. She will discontinue (Tresiba) and replace it with (Novolog) with a sliding scale. They both verbalized understanding.

## 2017-08-16 NOTE — TELEPHONE ENCOUNTER
Wife called- reports patient's glucose is ranging between 400-500. Denies vomiting or weakness or confusion. Advised to go to ER for further eval  Reason for Disposition   Blood glucose > 500 mg/dl (27.5 mmol/l)    Protocols used: ST DIABETES - HIGH BLOOD SUGAR-A-

## 2017-08-16 NOTE — TELEPHONE ENCOUNTER
Patient states he's taking victoza 1.8 units , because the insurance will not cover tresiba. Please Advise

## 2017-08-16 NOTE — TELEPHONE ENCOUNTER
I will send in novolog, for sliding scale:    (Measure blood sugar - 100)/50    For blood sugar > 200.

## 2017-08-16 NOTE — TELEPHONE ENCOUNTER
Pt was recommended by nursing staff to go to ER.  Did he go?    If not, looks like the steroid IV he had yesterday raised his sugars.  He takes Tresiba 15 units, would take an additional 5 units today and back to regular dosing tomorrow.

## 2017-08-28 ENCOUNTER — TELEPHONE (OUTPATIENT)
Dept: NEUROLOGY | Facility: CLINIC | Age: 57
End: 2017-08-28

## 2017-08-28 ENCOUNTER — LAB VISIT (OUTPATIENT)
Dept: LAB | Facility: HOSPITAL | Age: 57
End: 2017-08-28
Attending: PSYCHIATRY & NEUROLOGY
Payer: MEDICAID

## 2017-08-28 ENCOUNTER — PATIENT MESSAGE (OUTPATIENT)
Dept: NEUROLOGY | Facility: CLINIC | Age: 57
End: 2017-08-28

## 2017-08-28 DIAGNOSIS — I77.6 CNS VASCULITIS: ICD-10-CM

## 2017-08-28 DIAGNOSIS — Z79.899 HIGH RISK MEDICATION USE: ICD-10-CM

## 2017-08-28 DIAGNOSIS — Z79.899 HIGH RISK MEDICATION USE: Primary | ICD-10-CM

## 2017-08-28 LAB
ALBUMIN SERPL BCP-MCNC: 3.4 G/DL
ALP SERPL-CCNC: 76 U/L
ALT SERPL W/O P-5'-P-CCNC: 22 U/L
ANION GAP SERPL CALC-SCNC: 11 MMOL/L
AST SERPL-CCNC: 18 U/L
BASOPHILS # BLD AUTO: ABNORMAL K/UL
BASOPHILS NFR BLD: 6 %
BILIRUB SERPL-MCNC: 0.9 MG/DL
BUN SERPL-MCNC: 8 MG/DL
CALCIUM SERPL-MCNC: 9.2 MG/DL
CHLORIDE SERPL-SCNC: 101 MMOL/L
CO2 SERPL-SCNC: 25 MMOL/L
CREAT SERPL-MCNC: 1 MG/DL
DIFFERENTIAL METHOD: ABNORMAL
EOSINOPHIL # BLD AUTO: ABNORMAL K/UL
EOSINOPHIL NFR BLD: 7 %
ERYTHROCYTE [DISTWIDTH] IN BLOOD BY AUTOMATED COUNT: 13.4 %
EST. GFR  (AFRICAN AMERICAN): >60 ML/MIN/1.73 M^2
EST. GFR  (NON AFRICAN AMERICAN): >60 ML/MIN/1.73 M^2
GIANT PLATELETS BLD QL SMEAR: PRESENT
GLUCOSE SERPL-MCNC: 439 MG/DL
HCT VFR BLD AUTO: 34.1 %
HGB BLD-MCNC: 11.9 G/DL
LYMPHOCYTES # BLD AUTO: ABNORMAL K/UL
LYMPHOCYTES NFR BLD: 74 %
MCH RBC QN AUTO: 27.8 PG
MCHC RBC AUTO-ENTMCNC: 34.9 G/DL
MCV RBC AUTO: 80 FL
MONOCYTES # BLD AUTO: ABNORMAL K/UL
MONOCYTES NFR BLD: 12 %
NEUTROPHILS NFR BLD: 1 %
OVALOCYTES BLD QL SMEAR: ABNORMAL
PLATELET # BLD AUTO: 244 K/UL
PMV BLD AUTO: 8.5 FL
POIKILOCYTOSIS BLD QL SMEAR: SLIGHT
POTASSIUM SERPL-SCNC: 4.1 MMOL/L
PROT SERPL-MCNC: 6.9 G/DL
RBC # BLD AUTO: 4.28 M/UL
SODIUM SERPL-SCNC: 137 MMOL/L
WBC # BLD AUTO: 1.08 K/UL

## 2017-08-28 PROCEDURE — 85027 COMPLETE CBC AUTOMATED: CPT

## 2017-08-28 PROCEDURE — 80053 COMPREHEN METABOLIC PANEL: CPT

## 2017-08-28 PROCEDURE — 85007 BL SMEAR W/DIFF WBC COUNT: CPT

## 2017-08-28 PROCEDURE — 36415 COLL VENOUS BLD VENIPUNCTURE: CPT

## 2017-08-29 NOTE — TELEPHONE ENCOUNTER
"Call placed to Bekah. Informed her of latest CBC (WBC) results. Discussed at length the potential seriousness of his lab results and why. Questions answered. States she will take him to [facility] lab tomorrow.    Bekah mentioned that pt has been more "foggy" over the last two days. She is unable to carry a decent conversation with him as he is incredibly somnolent. He has also been falling more.  "

## 2017-08-29 NOTE — TELEPHONE ENCOUNTER
Spoke to patient's wife who states he has been more somnolent over the past day.  Denies that the patient has any fever or cough or dysuria.      I advised her that should the patient get worse today with regard to his somnolence, that she should bring him to ER.  He is leukopenic right now.  Again, advised her that if he develops any fever, that she should bring him to ER.

## 2017-08-30 ENCOUNTER — HOSPITAL ENCOUNTER (EMERGENCY)
Facility: HOSPITAL | Age: 57
Discharge: ANOTHER HOSPITAL | End: 2017-08-30
Attending: SURGERY
Payer: MEDICAID

## 2017-08-30 ENCOUNTER — HOSPITAL ENCOUNTER (INPATIENT)
Facility: HOSPITAL | Age: 57
LOS: 1 days | Discharge: HOME | DRG: 640 | End: 2017-08-31
Attending: HOSPITALIST | Admitting: HOSPITALIST
Payer: MEDICAID

## 2017-08-30 VITALS
OXYGEN SATURATION: 98 % | DIASTOLIC BLOOD PRESSURE: 67 MMHG | BODY MASS INDEX: 28.58 KG/M2 | WEIGHT: 210.75 LBS | SYSTOLIC BLOOD PRESSURE: 118 MMHG | HEART RATE: 82 BPM | RESPIRATION RATE: 16 BRPM | TEMPERATURE: 99 F

## 2017-08-30 DIAGNOSIS — R41.82 ALTERED MENTAL STATUS, UNSPECIFIED ALTERED MENTAL STATUS TYPE: ICD-10-CM

## 2017-08-30 DIAGNOSIS — R41.82 ALTERED MENTAL STATUS: ICD-10-CM

## 2017-08-30 DIAGNOSIS — R41.82 ALTERED MENTAL STATUS: Primary | ICD-10-CM

## 2017-08-30 DIAGNOSIS — I77.6 CNS VASCULITIS: ICD-10-CM

## 2017-08-30 DIAGNOSIS — D70.9 NEUTROPENIA, UNSPECIFIED TYPE: Primary | ICD-10-CM

## 2017-08-30 PROBLEM — T45.1X5A CHEMOTHERAPY-INDUCED NEUTROPENIA: Status: ACTIVE | Noted: 2017-08-30

## 2017-08-30 PROBLEM — D70.1 CHEMOTHERAPY-INDUCED NEUTROPENIA: Status: ACTIVE | Noted: 2017-08-30

## 2017-08-30 LAB
ALBUMIN SERPL BCP-MCNC: 3.2 G/DL
ALP SERPL-CCNC: 82 U/L
ALT SERPL W/O P-5'-P-CCNC: 20 U/L
ANION GAP SERPL CALC-SCNC: 12 MMOL/L
APTT BLDCRRT: 23.9 SEC
AST SERPL-CCNC: 17 U/L
BACTERIA #/AREA URNS HPF: NORMAL /HPF
BASOPHILS # BLD AUTO: 0.03 K/UL
BASOPHILS NFR BLD: 1.8 %
BILIRUB SERPL-MCNC: 0.7 MG/DL
BILIRUB UR QL STRIP: NEGATIVE
BNP SERPL-MCNC: 50 PG/ML
BUN SERPL-MCNC: 12 MG/DL
CALCIUM SERPL-MCNC: 9.6 MG/DL
CHLORIDE SERPL-SCNC: 102 MMOL/L
CK MB SERPL-MCNC: 1.2 NG/ML
CK MB SERPL-RTO: 0.9 %
CK SERPL-CCNC: 130 U/L
CK SERPL-CCNC: 130 U/L
CLARITY UR: CLEAR
CO2 SERPL-SCNC: 26 MMOL/L
COLOR UR: YELLOW
CREAT SERPL-MCNC: 1.1 MG/DL
DIFFERENTIAL METHOD: ABNORMAL
EOSINOPHIL # BLD AUTO: 0 K/UL
EOSINOPHIL NFR BLD: 2.4 %
ERYTHROCYTE [DISTWIDTH] IN BLOOD BY AUTOMATED COUNT: 13.4 %
EST. GFR  (AFRICAN AMERICAN): >60 ML/MIN/1.73 M^2
EST. GFR  (NON AFRICAN AMERICAN): >60 ML/MIN/1.73 M^2
GLUCOSE SERPL-MCNC: 331 MG/DL
GLUCOSE UR QL STRIP: ABNORMAL
HCT VFR BLD AUTO: 33.7 %
HGB BLD-MCNC: 11.7 G/DL
HGB UR QL STRIP: ABNORMAL
HYALINE CASTS #/AREA URNS LPF: 0 /LPF
INR PPP: 0.9
INR PPP: 1
KETONES UR QL STRIP: NEGATIVE
LACTATE SERPL-SCNC: 3.1 MMOL/L
LEUKOCYTE ESTERASE UR QL STRIP: NEGATIVE
LYMPHOCYTES # BLD AUTO: 0.9 K/UL
LYMPHOCYTES NFR BLD: 53 %
MAGNESIUM SERPL-MCNC: 1.5 MG/DL
MCH RBC QN AUTO: 27.5 PG
MCHC RBC AUTO-ENTMCNC: 34.7 G/DL
MCV RBC AUTO: 79 FL
MICROSCOPIC COMMENT: NORMAL
MONOCYTES # BLD AUTO: 0.6 K/UL
MONOCYTES NFR BLD: 33.3 %
NEUTROPHILS # BLD AUTO: 0.2 K/UL
NEUTROPHILS NFR BLD: 11.3 %
NITRITE UR QL STRIP: NEGATIVE
PH UR STRIP: 6 [PH] (ref 5–8)
PHOSPHATE SERPL-MCNC: 2.6 MG/DL
PLATELET # BLD AUTO: 246 K/UL
PMV BLD AUTO: 8.5 FL
POCT GLUCOSE: 297 MG/DL (ref 70–110)
POCT GLUCOSE: 347 MG/DL (ref 70–110)
POCT GLUCOSE: 366 MG/DL (ref 70–110)
POTASSIUM SERPL-SCNC: 4.2 MMOL/L
PROCALCITONIN SERPL IA-MCNC: 0.1 NG/ML
PROT SERPL-MCNC: 7.1 G/DL
PROT UR QL STRIP: ABNORMAL
PROTHROMBIN TIME: 10.1 SEC
PROTHROMBIN TIME: 9.9 SEC
RBC # BLD AUTO: 4.25 M/UL
RBC #/AREA URNS HPF: 2 /HPF (ref 0–4)
SODIUM SERPL-SCNC: 140 MMOL/L
SP GR UR STRIP: >=1.03 (ref 1–1.03)
TROPONIN I SERPL DL<=0.01 NG/ML-MCNC: 0.03 NG/ML
TSH SERPL DL<=0.005 MIU/L-ACNC: 1.64 UIU/ML
URN SPEC COLLECT METH UR: ABNORMAL
UROBILINOGEN UR STRIP-ACNC: NEGATIVE EU/DL
WBC # BLD AUTO: 1.68 K/UL
WBC #/AREA URNS HPF: 3 /HPF (ref 0–5)

## 2017-08-30 PROCEDURE — 80053 COMPREHEN METABOLIC PANEL: CPT

## 2017-08-30 PROCEDURE — 84100 ASSAY OF PHOSPHORUS: CPT

## 2017-08-30 PROCEDURE — 93005 ELECTROCARDIOGRAM TRACING: CPT

## 2017-08-30 PROCEDURE — 63600175 PHARM REV CODE 636 W HCPCS: Performed by: SURGERY

## 2017-08-30 PROCEDURE — 85730 THROMBOPLASTIN TIME PARTIAL: CPT

## 2017-08-30 PROCEDURE — 96365 THER/PROPH/DIAG IV INF INIT: CPT

## 2017-08-30 PROCEDURE — 25000003 PHARM REV CODE 250: Performed by: HOSPITALIST

## 2017-08-30 PROCEDURE — 99223 1ST HOSP IP/OBS HIGH 75: CPT | Mod: ,,, | Performed by: HOSPITALIST

## 2017-08-30 PROCEDURE — 87040 BLOOD CULTURE FOR BACTERIA: CPT | Mod: 59

## 2017-08-30 PROCEDURE — 85025 COMPLETE CBC W/AUTO DIFF WBC: CPT

## 2017-08-30 PROCEDURE — 85610 PROTHROMBIN TIME: CPT | Mod: 91

## 2017-08-30 PROCEDURE — 85610 PROTHROMBIN TIME: CPT

## 2017-08-30 PROCEDURE — 96367 TX/PROPH/DG ADDL SEQ IV INF: CPT

## 2017-08-30 PROCEDURE — 82553 CREATINE MB FRACTION: CPT

## 2017-08-30 PROCEDURE — 84145 PROCALCITONIN (PCT): CPT

## 2017-08-30 PROCEDURE — 84443 ASSAY THYROID STIM HORMONE: CPT

## 2017-08-30 PROCEDURE — 25000003 PHARM REV CODE 250: Performed by: SURGERY

## 2017-08-30 PROCEDURE — 99285 EMERGENCY DEPT VISIT HI MDM: CPT | Mod: 25

## 2017-08-30 PROCEDURE — 63600175 PHARM REV CODE 636 W HCPCS: Performed by: HOSPITALIST

## 2017-08-30 PROCEDURE — 83605 ASSAY OF LACTIC ACID: CPT

## 2017-08-30 PROCEDURE — 96366 THER/PROPH/DIAG IV INF ADDON: CPT

## 2017-08-30 PROCEDURE — 36415 COLL VENOUS BLD VENIPUNCTURE: CPT

## 2017-08-30 PROCEDURE — 83880 ASSAY OF NATRIURETIC PEPTIDE: CPT

## 2017-08-30 PROCEDURE — 81000 URINALYSIS NONAUTO W/SCOPE: CPT

## 2017-08-30 PROCEDURE — 20600001 HC STEP DOWN PRIVATE ROOM

## 2017-08-30 PROCEDURE — 83735 ASSAY OF MAGNESIUM: CPT

## 2017-08-30 PROCEDURE — 84484 ASSAY OF TROPONIN QUANT: CPT

## 2017-08-30 PROCEDURE — 25000003 PHARM REV CODE 250: Performed by: STUDENT IN AN ORGANIZED HEALTH CARE EDUCATION/TRAINING PROGRAM

## 2017-08-30 PROCEDURE — 63600175 PHARM REV CODE 636 W HCPCS: Performed by: STUDENT IN AN ORGANIZED HEALTH CARE EDUCATION/TRAINING PROGRAM

## 2017-08-30 RX ORDER — LEVETIRACETAM 500 MG/1
500 TABLET ORAL 2 TIMES DAILY
Status: DISCONTINUED | OUTPATIENT
Start: 2017-08-30 | End: 2017-08-31 | Stop reason: HOSPADM

## 2017-08-30 RX ORDER — ATENOLOL 50 MG/1
50 TABLET ORAL DAILY
Status: DISCONTINUED | OUTPATIENT
Start: 2017-08-30 | End: 2017-08-31 | Stop reason: HOSPADM

## 2017-08-30 RX ORDER — DILTIAZEM HYDROCHLORIDE 120 MG/1
240 CAPSULE, COATED, EXTENDED RELEASE ORAL DAILY
Status: DISCONTINUED | OUTPATIENT
Start: 2017-08-30 | End: 2017-08-31 | Stop reason: HOSPADM

## 2017-08-30 RX ORDER — SERTRALINE HYDROCHLORIDE 50 MG/1
150 TABLET, FILM COATED ORAL DAILY
Status: DISCONTINUED | OUTPATIENT
Start: 2017-08-30 | End: 2017-08-31 | Stop reason: HOSPADM

## 2017-08-30 RX ORDER — GLUCAGON 1 MG
1 KIT INJECTION
Status: DISCONTINUED | OUTPATIENT
Start: 2017-08-30 | End: 2017-08-31 | Stop reason: HOSPADM

## 2017-08-30 RX ORDER — SODIUM CHLORIDE 9 MG/ML
1000 INJECTION, SOLUTION INTRAVENOUS
Status: COMPLETED | OUTPATIENT
Start: 2017-08-30 | End: 2017-08-30

## 2017-08-30 RX ORDER — IBUPROFEN 200 MG
24 TABLET ORAL
Status: DISCONTINUED | OUTPATIENT
Start: 2017-08-30 | End: 2017-08-31 | Stop reason: HOSPADM

## 2017-08-30 RX ORDER — ASPIRIN 81 MG/1
81 TABLET ORAL DAILY
Status: DISCONTINUED | OUTPATIENT
Start: 2017-08-30 | End: 2017-08-31 | Stop reason: HOSPADM

## 2017-08-30 RX ORDER — INSULIN ASPART 100 [IU]/ML
12 INJECTION, SOLUTION INTRAVENOUS; SUBCUTANEOUS
Status: DISCONTINUED | OUTPATIENT
Start: 2017-08-30 | End: 2017-08-31 | Stop reason: HOSPADM

## 2017-08-30 RX ORDER — INSULIN ASPART 100 [IU]/ML
0-5 INJECTION, SOLUTION INTRAVENOUS; SUBCUTANEOUS
Status: DISCONTINUED | OUTPATIENT
Start: 2017-08-30 | End: 2017-08-31 | Stop reason: HOSPADM

## 2017-08-30 RX ORDER — MAGNESIUM SULFATE HEPTAHYDRATE 40 MG/ML
2 INJECTION, SOLUTION INTRAVENOUS ONCE
Status: COMPLETED | OUTPATIENT
Start: 2017-08-30 | End: 2017-08-30

## 2017-08-30 RX ORDER — VALSARTAN 160 MG/1
320 TABLET ORAL DAILY
Status: DISCONTINUED | OUTPATIENT
Start: 2017-08-30 | End: 2017-08-31 | Stop reason: HOSPADM

## 2017-08-30 RX ORDER — IBUPROFEN 200 MG
16 TABLET ORAL
Status: DISCONTINUED | OUTPATIENT
Start: 2017-08-30 | End: 2017-08-31 | Stop reason: HOSPADM

## 2017-08-30 RX ORDER — ATORVASTATIN CALCIUM 20 MG/1
40 TABLET, FILM COATED ORAL DAILY
Status: DISCONTINUED | OUTPATIENT
Start: 2017-08-30 | End: 2017-08-31 | Stop reason: HOSPADM

## 2017-08-30 RX ADMIN — ATENOLOL 50 MG: 25 TABLET ORAL at 09:08

## 2017-08-30 RX ADMIN — LEVETIRACETAM 500 MG: 500 TABLET ORAL at 09:08

## 2017-08-30 RX ADMIN — DILTIAZEM HYDROCHLORIDE 240 MG: 240 CAPSULE, COATED, EXTENDED RELEASE ORAL at 09:08

## 2017-08-30 RX ADMIN — MAGNESIUM SULFATE IN WATER 2 G: 40 INJECTION, SOLUTION INTRAVENOUS at 09:08

## 2017-08-30 RX ADMIN — INSULIN ASPART 12 UNITS: 100 INJECTION, SOLUTION INTRAVENOUS; SUBCUTANEOUS at 12:08

## 2017-08-30 RX ADMIN — INSULIN ASPART 2 UNITS: 100 INJECTION, SOLUTION INTRAVENOUS; SUBCUTANEOUS at 10:08

## 2017-08-30 RX ADMIN — SODIUM CHLORIDE 1000 ML: 0.9 INJECTION, SOLUTION INTRAVENOUS at 01:08

## 2017-08-30 RX ADMIN — ASPIRIN 81 MG: 81 TABLET, COATED ORAL at 09:08

## 2017-08-30 RX ADMIN — INSULIN ASPART 12 UNITS: 100 INJECTION, SOLUTION INTRAVENOUS; SUBCUTANEOUS at 05:08

## 2017-08-30 RX ADMIN — SERTRALINE HYDROCHLORIDE 150 MG: 50 TABLET ORAL at 09:08

## 2017-08-30 RX ADMIN — PIPERACILLIN AND TAZOBACTAM 4.5 G: 4; .5 INJECTION, POWDER, LYOPHILIZED, FOR SOLUTION INTRAVENOUS; PARENTERAL at 01:08

## 2017-08-30 RX ADMIN — INSULIN ASPART 5 UNITS: 100 INJECTION, SOLUTION INTRAVENOUS; SUBCUTANEOUS at 12:08

## 2017-08-30 RX ADMIN — INSULIN ASPART 3 UNITS: 100 INJECTION, SOLUTION INTRAVENOUS; SUBCUTANEOUS at 05:08

## 2017-08-30 RX ADMIN — VANCOMYCIN HYDROCHLORIDE 1000 MG: 1 INJECTION, POWDER, LYOPHILIZED, FOR SOLUTION INTRAVENOUS at 02:08

## 2017-08-30 RX ADMIN — VALSARTAN 320 MG: 160 TABLET, FILM COATED ORAL at 09:08

## 2017-08-30 RX ADMIN — ATORVASTATIN CALCIUM 40 MG: 20 TABLET, FILM COATED ORAL at 09:08

## 2017-08-30 RX ADMIN — SODIUM CHLORIDE 1000 ML: 0.9 INJECTION, SOLUTION INTRAVENOUS at 02:08

## 2017-08-30 RX ADMIN — INSULIN DETEMIR 10 UNITS: 100 INJECTION, SOLUTION SUBCUTANEOUS at 09:08

## 2017-08-30 NOTE — ED NOTES
The patient is awake, alert and cooperative with a calm affect, patient is aware of environment. Airway is open and patent, respirations are spontaneous, normal respiratory effort and rate noted, skin warm and dry, full ROM in all extremities, appearance: no apparent distress noted, resting comfortably.  VSS, no change from previous assessment.  Bed in low, locked position, SR x2, HOB 30 degrees.  Pt able to change position independently.  Call bell within reach of pt.  Pt verbalizes understanding of use.  Will continue to monitor.  Awaiting [facility] for transport.

## 2017-08-30 NOTE — ED NOTES
The patient is awake, alert and cooperative with a calm affect, patient is aware of environment. Airway is open and patent, respirations are spontaneous, normal respiratory effort and rate noted, skin warm and dry, full ROM in all extremities, appearance: no apparent distress noted, resting comfortably.  VSS, no change from previous assessment.  Bed in low, locked position, SR x2, HOB 30 degrees.  Pt able to change position independently.  Call bell within reach of pt.  Pt verbalizes understanding of use.  Will continue to monitor.  Wife at bedside.  Awaiting [facility].

## 2017-08-30 NOTE — PROGRESS NOTES
Pt arrived via stretcher from [facility]. AAOX4. Pt orientated to room and call light. No signs of distress noted.  Pt told to call before getting gout of bed. Safety precautions maintained.  Bed is in low and locked position with side rails up x 2. Call bell is within reach of pt.

## 2017-08-30 NOTE — HPI
"57 y.o. Male with hx of HTN, HLD, DMII, depression and CNS vasculitis presented to ED 8/30/17 from home for somnolence, confusion, weakness, unsteady gait and recent leukopenia seen on lab work after cytoxan infusion (6/3/17) & (8/14/17). Wife called and spoke to MS team 8/28 complaining of "fogginess" over the last two days, inability to hold decent conversation, weakness and falls. She describes confusion as not knowing how to work [employer] or [employer] and talking less. Wife was cooking dinner last night while Mr. Nunez was in the restroom and when she went to check on him, she found him sitting on the ground between the toilet and tub. Patient denies feeling dizzy or lightheaded prior to fall. He does not think he hit his head and was on the floor for max of 20 minutes. Wife also reports urinary incontinence and 1 episode of bowel incontinence last night. Patient denies convulsions and numbness/tingling. Wife reports patient has not been eating or drinking well at home and sleep 18 continuous hours one day. Upon presentation to [place] ED, he was given IVFs and wife thinks his mental status improved, but still not back to cognitive baseline. Since arrival to [facility], he has been afebrile, with leukopenia (WBC 1.68) improved from outpatient labs, elevated lactate (3.1) and CXR showed opacity in left lower lobe concerning for atelectasis vs. pneumonia. Patient denies cough. Wife did not take temperature at home, but was concerned for fever because he was shivering. UA unremarkable for UTI and blood Cx and procalcitonin in process. Wife says she has not given prednisone at home since ~2 weeks prior to most recent cytoxan infusion in August. He has been going to [facility] 2x/wk which wife feels has helped somewhat with balance.     CNS Vasculitis hx:  Admission 5/13-5/18 for new onset diplopia  5/13: MRA brain/neck, MRI brain w/wo contrast showed multiple foci of hyperintensity in deep cerebral periventricular white " matter in pattern of demyelinating process. Stroke ruled out. Solumedrol started.  5/14: LP performed and CSF negative for oligoclonal bands.  Normal IgG index.  WBC 2.  Protein 93 (slightly more elevated than would be expected for a demyelinating process, but not thought to represent meningitis/encephalitis)  5/16:  Vitamin D low, supplemental started  5/17: MRI spine performed, no spinal cord lesions.  IV Solumedrol x 5 days during this admission  Exam was remarkable for L JAYDEN  Admission 5/23-6/4 after fall. Intermittent aphasia, inattention, and delayed response. EEG 5/25 (2 hr) showed moderate disorganization and slowing c/w nonspecific encephalopathy. No ictal discharges; however was discharged on Keppra 500 mg BID as ppx.   Repeat MRI revealed new lesions with one lesion in R frontal lobe concerning for stroke. Vascular Neuro and General Neuro consulted. Conventional angiogram and MRA imaging of CNS vessel walls both suggestive of CNS vasculitis. Given Cytoxan 6/3 for CNS vasculitis with plan to repeat cytoxan once monthly (for total of 3) and discharged home with home health on prednisone 60 mg daily.   7/19/17-seen in Neuro clinic with LEVI Landaverde and prednisone lowered to 40 mg daily

## 2017-08-30 NOTE — CONSULTS
"[facility] [address]  Neurology  Consult Note    Patient Name: Bessy Nunez  MRN: [id number]  Admission Date: 8/30/2017  Hospital Length of Stay: 0 days  Code Status: Full Code   Attending Provider: Jeremy Goddard MD   Consulting Provider: Magnolia Hebert PA-C  Primary Care Physician: Edgar Nova MD  Principal Problem:CNS vasculitis    Inpatient consult to Neurology  Consult performed by: MAGNOLIA HEBERT  Consult ordered by: JORJE TORRES         Subjective:     Chief Complaint:  AMS worse than baseline  Balance issues with previous dx of CNS vasculitis treated with Cytoxan     HPI:   57 y.o. Male with hx of HTN, HLD, DMII, depression and CNS vasculitis presented to ED 8/30/17 from home for somnolence, confusion, weakness, unsteady gait and recent leukopenia seen on lab work after cytoxan infusion (6/3/17) & (8/14/17). Wife called and spoke to MS team 8/28 complaining of "fogginess" over the last two days, inability to hold decent conversation, weakness and falls. She describes confusion as not knowing how to work Iphone or [employer] and talking less. Wife was cooking dinner last night while Mr. Nunez was in the restroom and when she went to check on him, she found him sitting on the ground between the toilet and tub. Patient denies feeling dizzy or lightheaded prior to fall. He does not think he hit his head and was on the floor for max of 20 minutes. Wife also reports urinary incontinence and 1 episode of bowel incontinence last night. Patient denies convulsions and numbness/tingling. Wife reports patient has not been eating or drinking well at home and sleep 18 continuous hours one day. Upon presentation to [place] ED, he was given IVFs and wife thinks his mental status improved, but still not back to cognitive baseline. Since arrival to [facility], he has been afebrile, with leukopenia (WBC 1.68) improved from outpatient labs, elevated lactate (3.1) and CXR showed opacity in left lower lobe concerning " for atelectasis vs. pneumonia. Patient denies cough. Wife did not take temperature at home, but was concerned for fever because he was shivering. UA unremarkable for UTI and blood Cx and procalcitonin in process. Wife says she has not given prednisone at home since ~2 weeks prior to most recent cytoxan infusion in August. He has been going to [facility] 2x/wk which wife feels has helped somewhat with balance.     CNS Vasculitis hx:  Admission 5/13-5/18 for new onset diplopia  5/13: MRA brain/neck, MRI brain w/wo contrast showed multiple foci of hyperintensity in deep cerebral periventricular white matter in pattern of demyelinating process. Stroke ruled out. Solumedrol started.  5/14: LP performed and CSF negative for oligoclonal bands.  Normal IgG index.  WBC 2.  Protein 93 (slightly more elevated than would be expected for a demyelinating process, but not thought to represent meningitis/encephalitis)  5/16:  Vitamin D low, supplemental started  5/17: MRI spine performed, no spinal cord lesions.  IV Solumedrol x 5 days during this admission  Exam was remarkable for L JAYDEN  Admission 5/23-6/4 after fall. Intermittent aphasia, inattention, and delayed response. EEG 5/25 (2 hr) showed moderate disorganization and slowing c/w nonspecific encephalopathy. No ictal discharges; however was discharged on Keppra 500 mg BID as ppx.   Repeat MRI revealed new lesions with one lesion in R frontal lobe concerning for stroke. Vascular Neuro and General Neuro consulted. Conventional angiogram and MRA imaging of CNS vessel walls both suggestive of CNS vasculitis. Given Cytoxan 6/3 for CNS vasculitis with plan to repeat cytoxan once monthly (for total of 3) and discharged home with home health on prednisone 60 mg daily.   7/19/17-seen in Neuro clinic with LEVI Landaverde and prednisone lowered to 40 mg daily. Wife reports he has not been taking oral prednisone since ~2 weeks prior to second cytoxan infusion in August 2017.    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    Mixed hyperlipidemia 11/9/2012    NAFLD (nonalcoholic fatty liver disease) 10/11/2013    CHASE (nonalcoholic steatohepatitis)     Obesity     Stroke     Type 2 diabetes mellitus with diabetic polyneuropathy, with long-term current use of insulin 5/14/2017     Past Surgical History:   Procedure Laterality Date    SKIN CANCER EXCISION       Review of patient's allergies indicates:  No Known Allergies    Current Facility-Administered Medications on File Prior to Encounter   Medication    [COMPLETED] 0.9%  NaCl infusion    [COMPLETED] 0.9%  NaCl infusion    [COMPLETED] piperacillin-tazobactam 4.5 g in dextrose 5 % 100 mL IVPB (ready to mix system)    [COMPLETED] vancomycin 1 g in dextrose 5 % 250 mL IVPB (ready to mix system)     Current Outpatient Prescriptions on File Prior to Encounter   Medication Sig    aspirin (ECOTRIN) 81 MG EC tablet Take 81 mg by mouth once daily.    atenolol (TENORMIN) 50 MG tablet Take 1 tablet (50 mg total) by mouth once daily.    atorvastatin (LIPITOR) 40 MG tablet Take 1 tablet (40 mg total) by mouth once daily.    diltiaZEM (DILACOR XR) 240 MG CDCR Take 1 capsule (240 mg total) by mouth once daily.    insulin aspart (NOVOLOG) 100 unit/mL InPn pen [measured glc - 100]/50 = # units sliding scale    insulin degludec (TRESIBA FLEXTOUCH U-200) 200 unit/mL (3 mL) InPn Inject 15 Units into the skin every evening.    insulin needles, disposable, (BD ULTRA-FINE ANA PEN NEEDLES) 32 x 5/32 " Ndle Inject twice daily    levetiracetam (KEPPRA) 500 MG Tab Take 1 tablet (500 mg total) by mouth 2 (two) times daily.    metformin (GLUCOPHAGE-XR) 500 MG 24 hr tablet Take 2 tablets (1,000 mg total) by mouth 2 (two) times daily with meals.    omega-3 fatty acids-vitamin E (FISH OIL) 1,000 mg Cap Take 1 capsule by mouth 2 (two) times daily.    [START ON 9/12/2017] " predniSONE (DELTASONE) 20 MG tablet Take 1 tablet (20 mg total) by mouth once daily.    ranitidine (ZANTAC) 150 MG tablet Take 1 tablet (150 mg total) by mouth 2 (two) times daily.    sertraline (ZOLOFT) 100 MG tablet 1 and half tablet daily (Patient taking differently: Take 150 mg by mouth once daily. )    valsartan (DIOVAN) 320 MG tablet Take 1 tablet (320 mg total) by mouth once daily.    vitamin D 1000 units Tab Take 5 tablets (5,000 Units total) by mouth once daily.    liraglutide 0.6 mg/0.1 mL, 18 mg/3 mL, subq PNIJ (VICTOZA 3-TOMASZ) 0.6 mg/0.1 mL (18 mg/3 mL) PnIj Inject 1.8 mg into the skin once daily.     Family History     Problem Relation (Age of Onset)    Cancer Father    Diabetes Maternal Aunt    Heart disease Mother    Heart failure Mother    Hypertension Mother        Social History Main Topics    Smoking status: Never Smoker    Smokeless tobacco: Never Used    Alcohol use No    Drug use: No    Sexual activity: Not on file     Review of Systems   Constitutional: Positive for activity change, appetite change and fatigue. Negative for diaphoresis and fever.   HENT: Negative for tinnitus, trouble swallowing and voice change.    Eyes: Negative for photophobia, pain and visual disturbance.   Respiratory: Negative for chest tightness and shortness of breath.    Cardiovascular: Negative for chest pain.   Gastrointestinal: Negative for abdominal pain, constipation, diarrhea, nausea and vomiting.   Genitourinary: Negative for dysuria.   Musculoskeletal: Positive for gait problem. Negative for neck pain and neck stiffness.   Skin: Negative for color change and rash.   Allergic/Immunologic: Positive for immunocompromised state.   Neurological: Positive for weakness. Negative for dizziness, tremors, seizures, syncope, facial asymmetry, speech difficulty, light-headedness, numbness and headaches.   Psychiatric/Behavioral: Positive for confusion and hallucinations.     Objective:     Vital Signs (Most  Recent):  Temp: 99.1 °F (37.3 °C) (08/30/17 1100)  Pulse: 93 (08/30/17 1100)  Resp: 18 (08/30/17 1100)  BP: 131/68 (08/30/17 1100)  SpO2: (!) 93 % (08/30/17 1100) Vital Signs (24h Range):  Temp:  [97.6 °F (36.4 °C)-99.1 °F (37.3 °C)] 99.1 °F (37.3 °C)  Pulse:  [80-95] 93  Resp:  [16-18] 18  SpO2:  [93 %-98 %] 93 %  BP: (117-164)/(64-86) 131/68     Weight: 97.3 kg (214 lb 8.1 oz)  Body mass index is 30.78 kg/m².    Physical Exam   Constitutional: He is oriented to person, place, and time. He appears well-nourished. No distress.   HENT:   Head: Normocephalic and atraumatic.   Eyes: Conjunctivae are normal. Pupils are equal, round, and reactive to light.   Neck: Normal range of motion. Neck supple.   Pulmonary/Chest: Effort normal.   Musculoskeletal: Normal range of motion.   Neurological: He is oriented to person, place, and time. He has a normal Finger-Nose-Finger Test.   Reflex Scores:       Bicep reflexes are 1+ on the right side and 1+ on the left side.       Brachioradialis reflexes are 1+ on the right side and 1+ on the left side.       Patellar reflexes are 1+ on the right side and 1+ on the left side.  Skin: He is not diaphoretic.   Psychiatric: His speech is normal.     NEUROLOGICAL EXAMINATION:     MENTAL STATUS   Oriented to person, place, and time.   Follows 3 step commands.   Attention: decreased (unable to spell WORLD backwards). Concentration: normal.   Speech: speech is normal   Level of consciousness: alert  Knowledge: good.   Able to name object. Able to repeat. Normal comprehension. Praxis: normal     CRANIAL NERVES     CN II   Visual fields full to confrontation.     CN III, IV, VI   Pupils are equal, round, and reactive to light.  Right pupil: Shape: regular. Reactivity: brisk.   Left pupil: Shape: regular. Reactivity: brisk.   Nystagmus: none   Diplopia: none    CN V   Facial sensation intact.     CN VII   Facial expression full, symmetric.     CN VIII   CN VIII normal.     CN IX, X   Palate:  symmetric    CN XI   CN XI normal.     CN XII   CN XII normal.     MOTOR EXAM   Muscle bulk: normal  Overall muscle tone: normal  Right arm pronator drift: absent  Left arm pronator drift: absent    Strength   Right deltoid: 5/5  Left deltoid: 5/5  Right biceps: 5/5  Left biceps: 5/5  Right triceps: 5/5  Left triceps: 5/5  Right wrist flexion: 5/5  Left wrist flexion: 5/5  Right wrist extension: 5/5  Left wrist extension: 5/5  Right interossei: 5/5  Left interossei: 5/5  Right iliopsoas: 4/5  Left iliopsoas: 4/5  Right quadriceps: 5/5  Left quadriceps: 5/5  Right anterior tibial: 5/5  Left anterior tibial: 5/5  Right peroneal: 5/5  Left peroneal: 5/5    REFLEXES     Reflexes   Right brachioradialis: 1+  Left brachioradialis: 1+  Right biceps: 1+  Left biceps: 1+  Right patellar: 1+  Left patellar: 1+  Right plantar: normal  Left plantar: normal  Right ankle clonus: absent  Left ankle clonus: absent    SENSORY EXAM   Light touch normal.   Right leg vibration: decreased from ankle  Left leg vibration: decreased from ankle    GAIT AND COORDINATION      Coordination   Finger to nose coordination: normal    Tremor   Resting tremor: absent    Significant Labs:   Hemoglobin A1c: No results for input(s): HGBA1C in the last 720 hours.  Blood Culture:   Recent Labs  Lab 08/30/17 0055   LABBLOO No Growth to date  No Growth to date     CBC:   Recent Labs  Lab 08/30/17 0055   WBC 1.68*   HGB 11.7*   HCT 33.7*        CMP:   Recent Labs  Lab 08/30/17 0055   *      K 4.2      CO2 26   BUN 12   CREATININE 1.1   CALCIUM 9.6   MG 1.5*   PROT 7.1   ALBUMIN 3.2*   BILITOT 0.7   ALKPHOS 82   AST 17   ALT 20   ANIONGAP 12   EGFRNONAA >60     Inflammatory Markers: No results for input(s): SEDRATE, CRP, PROCAL in the last 48 hours.  Prealbumin: No results for input(s): PREALBUMIN in the last 48 hours.  Respiratory Culture: No results for input(s): GSRESP, RESPIRATORYC in the last 48 hours.  Urine Culture: No  results for input(s): LABURIN in the last 48 hours.  Urine Studies:   Recent Labs  Lab 08/30/17  0202   COLORU Yellow   APPEARANCEUA Clear   PHUR 6.0   SPECGRAV >=1.030*   PROTEINUA 2+*   GLUCUA 2+*   KETONESU Negative   BILIRUBINUA Negative   OCCULTUA 1+*   NITRITE Negative   UROBILINOGEN Negative   LEUKOCYTESUR Negative   RBCUA 2   WBCUA 3   BACTERIA Occasional   HYALINECASTS 0     EEG (5/25/17) 2 hr 8 min:  Abnormal EEG with moderate disorganization and slowing indicative   of a nonfocal and nonspecific encephalopathy.    All pertinent lab results from the past 24 hours have been reviewed.    Significant Imaging: I have reviewed and interpreted all pertinent imaging results/findings within the past 24 hours.     CT Head WO contrast (8/30/17):  No evidence of acute infarction, hemorrhage, mass, or hydrocephalus. Scattered areas of decreased attenuation throughout the supratentorial white matter which is nonspecific but likely represents chronic microvascular ischemic changes, not significantly changed when compared to prior exam.    CXR (8/30/17):  Mild amount of opacity in the left lower lung zone which may represent atelectasis or pneumonia.  Assessment and Plan:     * CNS vasculitis    CNS vasculitis diagnosed after 2 recent admissions (5/13-5/18/17) and (5/24-6/4/17). Initially presented with new onset diplopia found to have L JAYDEN. MRI Brain W WO contrast showed multiple foci of hyperintensity in deep cerebral periventricular white matter in pattern of demyelinating process, CVA was ruled out. LP was performed 5/14, CSF negative for oligoclonal bands with Normal IgG index. WBC 2.  Protein 93. No spinal cord lesions on imaging. He received IV Solumedrol x 5 days during first admission without much improvement. Admitted 5/23-6/4 with intermittent aphasia, inattention, and delayed response. EEG 5/25 (2 hr) showed moderate disorganization and slowing c/w nonspecific encephalopathy. No ictal discharges; however was  discharged on Keppra 500 mg BID. Repeat MRI revealed new lesions with one lesion in R frontal lobe concerning for stroke. Vascular Neuro and General Neuro consulted. Conventional angiogram and MRA imaging of CNS vessel walls both suggestive of CNS vasculitis. Received Cytoxan 6/3 then discharged home 6/4 with home health on prednisone 60 mg daily. Saw Sharif Landaverde in Neuro clinic 7/19/17 and prednisone lowered to 40 mg daily. Wife reports patient has not received oral prednisone since ~2 weeks prior to second cytoxan infusion on 8/14/17.     This admission, patient presented to [facility] after wife was concerned for possible infection with recent leukopenia on labs and hypersomnolence, confusion, weakness and fall. Wife reports he has been confused on how to operate his Iphone, TV remote and falling asleep during conversation. Patient had unwitnessed fall last night at home in bathroom. Denies hitting head or loss of consciousness. Still experiencing some urinary incontinence and 1 episode of bowel incontinence last night. Has been taking Keppra 500 mg BID, although no documented clear epileptic episode on prior admissions (Keppra as ppx?) and EEG 8/25/17 was negative for ictal discharges. He has not been eating or drinking well at home. After receiving IVFs, wife feels his mental status has improved, but still not at baseline.     -Multifactorial AMS  Dehydration likely contributing, will r/o infection as patient is immunosuppressed with cytoxan     Recommendations:  -Infection workup per primary team/ID  Elevated lactate, procalcitonin pending, UA without evidence of UTI, blood Cx pending, CXR showed opacity in LLL atelectasis vs. pneumonia  -routine EEG, if negative for ictal discharges or other abnormalities, will likely discontinue Keppra to minimize medications/prevent possible mood changes/sedation  -continue home zoloft   -plan for 3rd cytoxan mid September (dose will likely be lowered based on WBC  count)  -scheduled for opthalmology appt 8/31, consider inpatient exam ?residual L JAYDEN  -PT/OT  -delirium precautions   Depressive disorder, not elsewhere classified    Continue home zoloft     VTE Risk Mitigation         Ordered     Medium Risk of VTE  Once      08/30/17 0801     Place ELIZABETH hose  Until discontinued      08/30/17 0801     Place sequential compression device  Until discontinued      08/30/17 0801        Dr. Love attestation to follow  Thank you for your consult. I will follow-up with patient. Please contact us if you have any additional questions.    Magnolia Hebert PA-C  General Neurology Consult  Neuro Consult [employer] # [id number]

## 2017-08-30 NOTE — ASSESSMENT & PLAN NOTE
CNS vasculitis diagnosed after 2 recent admissions (5/13-5/18/17) and (5/24-6/4/17). Initially presented with new onset diplopia found to have L JAYDEN. MRI Brain W WO contrast showed multiple foci of hyperintensity in deep cerebral periventricular white matter in pattern of demyelinating process, CVA was ruled out. LP was performed 5/14, CSF negative for oligoclonal bands with Normal IgG index. WBC 2.  Protein 93. No spinal cord lesions on imaging. He received IV Solumedrol x 5 days during first admission without much improvement. Admitted 5/23-6/4 with intermittent aphasia, inattention, and delayed response. EEG 5/25 (2 hr) showed moderate disorganization and slowing c/w nonspecific encephalopathy. No ictal discharges; however was discharged on Keppra 500 mg BID. Repeat MRI revealed new lesions with one lesion in R frontal lobe concerning for stroke. Vascular Neuro and General Neuro consulted. Conventional angiogram and MRA imaging of CNS vessel walls both suggestive of CNS vasculitis. Received Cytoxan 6/3 then discharged home 6/4 with home health on prednisone 60 mg daily. Saw Sharif Landaverde in Neuro clinic 7/19/17 and prednisone lowered to 40 mg daily. Wife reports patient has not received oral prednisone since ~2 weeks prior to second cytoxan infusion on 8/14/17.     This admission, patient presented to [facility] after wife was concerned for possible infection with recent leukopenia on labs and hypersomnolence, confusion, weakness and fall. Wife reports he has been confused on how to operate his Iphone, TV remote and falling asleep during conversation. Patient had unwitnessed fall last night at home in bathroom. Denies hitting head or loss of consciousness. Still experiencing some urinary incontinence and 1 episode of bowel incontinence last night. Has been taking Keppra 500 mg BID, although no documented clear epileptic episode on prior admissions (Keppra as ppx?) and EEG 8/25/17 was negative for ictal  discharges. He has not been eating or drinking well at home. After receiving IVFs, wife feels his mental status has improved, but still not at baseline.     -Multifactorial AMS  Dehydration likely contributing, will r/o infection as patient is immunosuppressed with cytoxan     Recommendations:  -Infection workup per primary team/ID  Elevated lactate, procalcitonin pending, UA without evidence of UTI, blood Cx pending, CXR showed opacity in LLL atelectasis vs. pneumonia  -routine EEG, if negative for ictal discharges or other abnormalities, will likely discontinue Keppra to minimize medications/prevent possible mood changes/sedation  -continue home zoloft   -plan for 3rd cytoxan mid September (dose will likely be lowered based on WBC count)  -scheduled for opthalmology appt 8/31, consider inpatient exam ?residual L JAYDEN  -daily PT/OT  -delirium precautions

## 2017-08-30 NOTE — PLAN OF CARE
Edgar Nova MD   [address] / [place] [id number]       [facility]  [address]  [address]  [place] [address]  Phone: [phone] Fax: [phone]    Payor: MEDICAID / [facility] Product Type: Managed Medicaid /         08/30/17 1233   Discharge Assessment   Assessment Type Discharge Planning Assessment   Confirmed/corrected address and phone number on facesheet? Yes   Assessment information obtained from? Patient   Expected Length of Stay (days) 3   Communicated expected length of stay with patient/caregiver yes   Prior to hospitilization cognitive status: Alert/Oriented   Prior to hospitalization functional status: Independent   Current cognitive status: Alert/Oriented   Current Functional Status: Independent   Lives With spouse   Able to Return to Prior Arrangements yes   Is patient able to care for self after discharge? Yes   Who are your caregiver(s) and their phone number(s)? Bekah Nunez ( spouse) [phone]   Patient's perception of discharge disposition home or selfcare   Readmission Within The Last 30 Days no previous admission in last 30 days   Patient currently being followed by outpatient case management? No   Patient currently receives any other outside agency services? No   Equipment Currently Used at Home none   Do you have any problems affording any of your prescribed medications? TBD   Is the patient taking medications as prescribed? yes   Does the patient have transportation home? Yes   Transportation Available family or friend will provide   Does the patient receive services at the [facility]madin Clinic? No   Discharge Plan A Home with family   Patient/Family In Agreement With Plan yes

## 2017-08-30 NOTE — ED PROVIDER NOTES
[facility]                                        August 30, 2017                   Chief Complaint  57 y.o. male with Abnormal Lab    History of Present Illness  Bessy Nunez presents to the emergency room with weakness and confusion this week  Patient is currently undergoing chemotherapy for CNS vasculitis and multiple sclerosis  Pt had one course of Cytoxan on 14 August 2017 at [facility]/Dr. Shana Love  Patient has been more confused this week and somnolent per wife's interview at bedside  Wife states the patient was confused and hard to arouse, defecated on himself today PTA  Wife states the patient is very weak in his gait, also had a subjective fever at home today  Patient currently is afebrile, recent outpatient lab work showed a neutropenia after chemo    The history is provided by the patient    Past Medical History   -- Essential hypertension    -- Internuclear ophthalmoplegia of left eye    -- Mixed hyperlipidemia    -- NAFLD (nonalcoholic fatty liver disease)    -- CHASE (nonalcoholic steatohepatitis)    -- Obesity    -- Stroke    -- Type 2 diabetes mellitus with diabetic polyneuropathy       Surgical history: Skin excision  No Known Allergies     Review of Systems and Physical Exam     Review of Systems  -- Constitution - fatigue, weakness, no chills, no fever  -- Eyes - no tearing or redness, no visual disturbance  -- Ear, Nose - no tinnitus or earache, no nasal congestion or discharge  -- Mouth,Throat - no sore throat, no toothache, normal voice, normal swallowing  -- Respiratory - denies cough and congestion, no shortness of breath, no VALENTIN  -- Cardiovascular - denies chest pain, no palpitations, denies claudication  -- Gastrointestinal - denies abdominal pain, nausea, vomiting, or diarrhea  -- Genitourinary - no dysuria, no denies flank pain, no hematuria or frequency   -- Musculoskeletal - denies back pain, negative for myalgias and arthralgias   -- Neurological -  confusion, altered mental status, incontinence  -- Skin - denies pallor, rash, or changes in skin. no hives or welts noted    Vital Signs  -- His oral temperature is 97.9 °F (36.6 °C).   -- His blood pressure is 120/73 and his pulse is 95.   -- His respiration is 16.      Physical Exam  -- Nursing note and vitals reviewed  -- Head: Atraumatic. Normocephalic. No obvious abnormality  -- Eyes: Pupils are equal and reactive to light. Normal conjunctiva and lids  -- Nose: Nose normal in appearance, nares grossly normal. No discharge  -- Throat: Mucous membranes moist, pharynx normal, normal tonsils. No lesions   -- Ears: External ears and TM normal bilaterally. Normal hearing and no drainage  -- Neck: Normal range of motion. Neck supple. No masses, trachea midline  -- Cardiac: Normal rate, regular rhythm and normal heart sounds  -- Pulmonary: Normal respiratory effort, breath sounds clear to auscultation  -- Abdominal: Soft, no tenderness. Normal bowel sounds. Normal liver edge  -- Musculoskeletal: Normal range of motion, no effusions. Joints stable   -- Neurological: No focal deficits. Showed good interaction with staff  -- Vascular: Posterior tibial, dorsalis pedis and radial pulses 2+ bilaterally      Emergency Room Course     Treatment and Evaluation  -- The CT of the head performed in the ER today was negative for acute pathology   -- The EKG findings today were without concerning findings from baseline   -- Chest x-ray showed no infiltrate and showed no acute pathology     Lab values  --    -- K 4.2   --    -- CO2 26   -- BUN 12   -- CREATININE 1.1   --  (H)   -- ALKPHOS 82   -- AST 17   -- ALT 20   -- BILITOT 0.7   -- ALBUMIN 3.2 (L)   -- PROT 7.1   -- WBC 1.68 (LL)   -- HGB 11.7 (L)   -- HCT 33.7 (L)   --    --    -- CPKMB 1.2   -- TROPONINI 0.029 (H)   -- INR 1.0   -- BNP 50   -- LACTATE 3.1 (H)     Medications Given  -- 0.9%  NaCl infusion (not administered)   --  piperacillin-tazobactam 4.5 g in dextrose 5 % 100 mL IVPB   -- vancomycin 1 g in dextrose 5 % 250 mL IVPB (ready to mix system) (not administered)   -- 0.9%  NaCl infusion (1,000 mLs Intravenous New Bag 8/30/17 0110)     Diagnosis  -- Neutropenia  -- Altered mental status     Disposition and Plan  -- Disposition: transfer  -- Condition: stable  -- The patient needs a higher level of care  -- The patient is appropriate for transfer     This note is dictated on Dragon Natural Speaking word recognition program.  There are word recognition mistakes that are occasionally missed on review.           Jayesh Cho MD  08/30/17 0149

## 2017-08-30 NOTE — SUBJECTIVE & OBJECTIVE
"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    Mixed hyperlipidemia 11/9/2012    NAFLD (nonalcoholic fatty liver disease) 10/11/2013    CHASE (nonalcoholic steatohepatitis)     Obesity     Stroke     Type 2 diabetes mellitus with diabetic polyneuropathy, with long-term current use of insulin 5/14/2017     Past Surgical History:   Procedure Laterality Date    SKIN CANCER EXCISION       Review of patient's allergies indicates:  No Known Allergies    Current Facility-Administered Medications on File Prior to Encounter   Medication    [COMPLETED] 0.9%  NaCl infusion    [COMPLETED] 0.9%  NaCl infusion    [COMPLETED] piperacillin-tazobactam 4.5 g in dextrose 5 % 100 mL IVPB (ready to mix system)    [COMPLETED] vancomycin 1 g in dextrose 5 % 250 mL IVPB (ready to mix system)     Current Outpatient Prescriptions on File Prior to Encounter   Medication Sig    aspirin (ECOTRIN) 81 MG EC tablet Take 81 mg by mouth once daily.    atenolol (TENORMIN) 50 MG tablet Take 1 tablet (50 mg total) by mouth once daily.    atorvastatin (LIPITOR) 40 MG tablet Take 1 tablet (40 mg total) by mouth once daily.    diltiaZEM (DILACOR XR) 240 MG CDCR Take 1 capsule (240 mg total) by mouth once daily.    insulin aspart (NOVOLOG) 100 unit/mL InPn pen [measured glc - 100]/50 = # units sliding scale    insulin degludec (TRESIBA FLEXTOUCH U-200) 200 unit/mL (3 mL) InPn Inject 15 Units into the skin every evening.    insulin needles, disposable, (BD ULTRA-FINE ANA PEN NEEDLES) 32 x 5/32 " Ndle Inject twice daily    levetiracetam (KEPPRA) 500 MG Tab Take 1 tablet (500 mg total) by mouth 2 (two) times daily.    metformin (GLUCOPHAGE-XR) 500 MG 24 hr tablet Take 2 tablets (1,000 mg total) by mouth 2 (two) times daily with meals.    omega-3 fatty acids-vitamin E (FISH OIL) 1,000 mg Cap Take 1 capsule by mouth 2 (two) times daily.    [START ON " 9/12/2017] predniSONE (DELTASONE) 20 MG tablet Take 1 tablet (20 mg total) by mouth once daily.    ranitidine (ZANTAC) 150 MG tablet Take 1 tablet (150 mg total) by mouth 2 (two) times daily.    sertraline (ZOLOFT) 100 MG tablet 1 and half tablet daily (Patient taking differently: Take 150 mg by mouth once daily. )    valsartan (DIOVAN) 320 MG tablet Take 1 tablet (320 mg total) by mouth once daily.    vitamin D 1000 units Tab Take 5 tablets (5,000 Units total) by mouth once daily.    liraglutide 0.6 mg/0.1 mL, 18 mg/3 mL, subq PNIJ (VICTOZA 3-TOMASZ) 0.6 mg/0.1 mL (18 mg/3 mL) PnIj Inject 1.8 mg into the skin once daily.     Family History     Problem Relation (Age of Onset)    Cancer Father    Diabetes Maternal Aunt    Heart disease Mother    Heart failure Mother    Hypertension Mother        Social History Main Topics    Smoking status: Never Smoker    Smokeless tobacco: Never Used    Alcohol use No    Drug use: No    Sexual activity: Not on file     Review of Systems   Constitutional: Positive for activity change, appetite change and fatigue. Negative for diaphoresis and fever.   HENT: Negative for tinnitus, trouble swallowing and voice change.    Eyes: Negative for photophobia, pain and visual disturbance.   Respiratory: Negative for chest tightness and shortness of breath.    Cardiovascular: Negative for chest pain.   Gastrointestinal: Negative for abdominal pain, constipation, diarrhea, nausea and vomiting.   Genitourinary: Negative for dysuria.   Musculoskeletal: Positive for gait problem. Negative for neck pain and neck stiffness.   Skin: Negative for color change and rash.   Allergic/Immunologic: Positive for immunocompromised state.   Neurological: Positive for weakness. Negative for dizziness, tremors, seizures, syncope, facial asymmetry, speech difficulty, light-headedness, numbness and headaches.   Psychiatric/Behavioral: Positive for confusion and hallucinations.     Objective:     Vital Signs  (Most Recent):  Temp: 99.1 °F (37.3 °C) (08/30/17 1100)  Pulse: 93 (08/30/17 1100)  Resp: 18 (08/30/17 1100)  BP: 131/68 (08/30/17 1100)  SpO2: (!) 93 % (08/30/17 1100) Vital Signs (24h Range):  Temp:  [97.6 °F (36.4 °C)-99.1 °F (37.3 °C)] 99.1 °F (37.3 °C)  Pulse:  [80-95] 93  Resp:  [16-18] 18  SpO2:  [93 %-98 %] 93 %  BP: (117-164)/(64-86) 131/68     Weight: 97.3 kg (214 lb 8.1 oz)  Body mass index is 30.78 kg/m².    Physical Exam   Constitutional: He is oriented to person, place, and time. He appears well-nourished. No distress.   HENT:   Head: Normocephalic and atraumatic.   Eyes: Conjunctivae are normal. Pupils are equal, round, and reactive to light.   Neck: Normal range of motion. Neck supple.   Pulmonary/Chest: Effort normal.   Musculoskeletal: Normal range of motion.   Neurological: He is oriented to person, place, and time. He has a normal Finger-Nose-Finger Test.   Reflex Scores:       Bicep reflexes are 1+ on the right side and 1+ on the left side.       Brachioradialis reflexes are 1+ on the right side and 1+ on the left side.       Patellar reflexes are 1+ on the right side and 1+ on the left side.  Skin: He is not diaphoretic.   Psychiatric: His speech is normal.     NEUROLOGICAL EXAMINATION:     MENTAL STATUS   Oriented to person, place, and time.   Follows 3 step commands.   Attention: decreased (unable to spell WORLD backwards). Concentration: normal.   Speech: speech is normal   Level of consciousness: alert  Knowledge: good.   Able to name object. Able to repeat. Normal comprehension. Praxis: normal     CRANIAL NERVES     CN II   Visual fields full to confrontation.     CN III, IV, VI   Pupils are equal, round, and reactive to light.  Right pupil: Shape: regular. Reactivity: brisk.   Left pupil: Shape: regular. Reactivity: brisk.   Nystagmus: none   Diplopia: none    CN V   Facial sensation intact.     CN VII   Facial expression full, symmetric.     CN VIII   CN VIII normal.     CN IX, X    Palate: symmetric    CN XI   CN XI normal.     CN XII   CN XII normal.     MOTOR EXAM   Muscle bulk: normal  Overall muscle tone: normal  Right arm pronator drift: absent  Left arm pronator drift: absent    Strength   Right deltoid: 5/5  Left deltoid: 5/5  Right biceps: 5/5  Left biceps: 5/5  Right triceps: 5/5  Left triceps: 5/5  Right wrist flexion: 5/5  Left wrist flexion: 5/5  Right wrist extension: 5/5  Left wrist extension: 5/5  Right interossei: 5/5  Left interossei: 5/5  Right iliopsoas: 4/5  Left iliopsoas: 4/5  Right quadriceps: 5/5  Left quadriceps: 5/5  Right anterior tibial: 5/5  Left anterior tibial: 5/5  Right peroneal: 5/5  Left peroneal: 5/5    REFLEXES     Reflexes   Right brachioradialis: 1+  Left brachioradialis: 1+  Right biceps: 1+  Left biceps: 1+  Right patellar: 1+  Left patellar: 1+  Right plantar: normal  Left plantar: normal  Right ankle clonus: absent  Left ankle clonus: absent    SENSORY EXAM   Light touch normal.   Right leg vibration: decreased from ankle  Left leg vibration: decreased from ankle    GAIT AND COORDINATION      Coordination   Finger to nose coordination: normal    Tremor   Resting tremor: absent    Significant Labs:   Hemoglobin A1c: No results for input(s): HGBA1C in the last 720 hours.  Blood Culture:   Recent Labs  Lab 08/30/17 0055   LABBLOO No Growth to date  No Growth to date     CBC:   Recent Labs  Lab 08/30/17 0055   WBC 1.68*   HGB 11.7*   HCT 33.7*        CMP:   Recent Labs  Lab 08/30/17 0055   *      K 4.2      CO2 26   BUN 12   CREATININE 1.1   CALCIUM 9.6   MG 1.5*   PROT 7.1   ALBUMIN 3.2*   BILITOT 0.7   ALKPHOS 82   AST 17   ALT 20   ANIONGAP 12   EGFRNONAA >60     Inflammatory Markers: No results for input(s): SEDRATE, CRP, PROCAL in the last 48 hours.  Prealbumin: No results for input(s): PREALBUMIN in the last 48 hours.  Respiratory Culture: No results for input(s): GSRESP, RESPIRATORYC in the last 48 hours.  Urine  Culture: No results for input(s): LABURIN in the last 48 hours.  Urine Studies:   Recent Labs  Lab 08/30/17  0202   COLORU Yellow   APPEARANCEUA Clear   PHUR 6.0   SPECGRAV >=1.030*   PROTEINUA 2+*   GLUCUA 2+*   KETONESU Negative   BILIRUBINUA Negative   OCCULTUA 1+*   NITRITE Negative   UROBILINOGEN Negative   LEUKOCYTESUR Negative   RBCUA 2   WBCUA 3   BACTERIA Occasional   HYALINECASTS 0     EEG (5/25/17) 2 hr 8 min:  Abnormal EEG with moderate disorganization and slowing indicative   of a nonfocal and nonspecific encephalopathy.    All pertinent lab results from the past 24 hours have been reviewed.    Significant Imaging: I have reviewed and interpreted all pertinent imaging results/findings within the past 24 hours.     CT Head WO contrast (8/30/17):  No evidence of acute infarction, hemorrhage, mass, or hydrocephalus. Scattered areas of decreased attenuation throughout the supratentorial white matter which is nonspecific but likely represents chronic microvascular ischemic changes, not significantly changed when compared to prior exam.    CXR (8/30/17):  Mild amount of opacity in the left lower lung zone which may represent atelectasis or pneumonia.

## 2017-08-31 ENCOUNTER — TELEPHONE (OUTPATIENT)
Dept: NEUROLOGY | Facility: CLINIC | Age: 57
End: 2017-08-31

## 2017-08-31 VITALS
BODY MASS INDEX: 30.71 KG/M2 | TEMPERATURE: 98 F | OXYGEN SATURATION: 96 % | WEIGHT: 214.5 LBS | DIASTOLIC BLOOD PRESSURE: 67 MMHG | HEIGHT: 70 IN | SYSTOLIC BLOOD PRESSURE: 131 MMHG | HEART RATE: 72 BPM | RESPIRATION RATE: 18 BRPM

## 2017-08-31 DIAGNOSIS — I77.6 CNS VASCULITIS: ICD-10-CM

## 2017-08-31 DIAGNOSIS — T45.1X5A CHEMOTHERAPY INDUCED NEUTROPENIA: Primary | ICD-10-CM

## 2017-08-31 DIAGNOSIS — D70.1 CHEMOTHERAPY INDUCED NEUTROPENIA: Primary | ICD-10-CM

## 2017-08-31 PROBLEM — G93.40 ENCEPHALOPATHY ACUTE: Status: ACTIVE | Noted: 2017-08-30

## 2017-08-31 LAB
ALBUMIN SERPL BCP-MCNC: 2.9 G/DL
ALP SERPL-CCNC: 76 U/L
ALT SERPL W/O P-5'-P-CCNC: 14 U/L
ANION GAP SERPL CALC-SCNC: 10 MMOL/L
ANISOCYTOSIS BLD QL SMEAR: SLIGHT
AST SERPL-CCNC: 15 U/L
BASOPHILS # BLD AUTO: ABNORMAL K/UL
BASOPHILS NFR BLD: 0 %
BILIRUB SERPL-MCNC: 0.7 MG/DL
BUN SERPL-MCNC: 8 MG/DL
CALCIUM SERPL-MCNC: 8.7 MG/DL
CHLORIDE SERPL-SCNC: 105 MMOL/L
CO2 SERPL-SCNC: 26 MMOL/L
CREAT SERPL-MCNC: 0.8 MG/DL
DIFFERENTIAL METHOD: ABNORMAL
EOSINOPHIL # BLD AUTO: ABNORMAL K/UL
EOSINOPHIL NFR BLD: 6 %
ERYTHROCYTE [DISTWIDTH] IN BLOOD BY AUTOMATED COUNT: 13.6 %
EST. GFR  (AFRICAN AMERICAN): >60 ML/MIN/1.73 M^2
EST. GFR  (NON AFRICAN AMERICAN): >60 ML/MIN/1.73 M^2
GLUCOSE SERPL-MCNC: 199 MG/DL
HCT VFR BLD AUTO: 31 %
HGB BLD-MCNC: 10.8 G/DL
LYMPHOCYTES # BLD AUTO: ABNORMAL K/UL
LYMPHOCYTES NFR BLD: 53 %
MAGNESIUM SERPL-MCNC: 1.1 MG/DL
MCH RBC QN AUTO: 27.3 PG
MCHC RBC AUTO-ENTMCNC: 34.8 G/DL
MCV RBC AUTO: 79 FL
METAMYELOCYTES NFR BLD MANUAL: 2 %
MONOCYTES # BLD AUTO: ABNORMAL K/UL
MONOCYTES NFR BLD: 16 %
MYELOCYTES NFR BLD MANUAL: 1 %
NEUTROPHILS NFR BLD: 18 %
NEUTS BAND NFR BLD MANUAL: 4 %
OVALOCYTES BLD QL SMEAR: ABNORMAL
PHOSPHATE SERPL-MCNC: 2.8 MG/DL
PLATELET # BLD AUTO: 157 K/UL
PMV BLD AUTO: 8.8 FL
POCT GLUCOSE: 229 MG/DL (ref 70–110)
POCT GLUCOSE: 243 MG/DL (ref 70–110)
POCT GLUCOSE: 266 MG/DL (ref 70–110)
POIKILOCYTOSIS BLD QL SMEAR: SLIGHT
POLYCHROMASIA BLD QL SMEAR: ABNORMAL
POTASSIUM SERPL-SCNC: 3.2 MMOL/L
PROT SERPL-MCNC: 6.1 G/DL
RBC # BLD AUTO: 3.95 M/UL
SODIUM SERPL-SCNC: 141 MMOL/L
WBC # BLD AUTO: 2.26 K/UL

## 2017-08-31 PROCEDURE — 80053 COMPREHEN METABOLIC PANEL: CPT

## 2017-08-31 PROCEDURE — 85027 COMPLETE CBC AUTOMATED: CPT

## 2017-08-31 PROCEDURE — 97530 THERAPEUTIC ACTIVITIES: CPT

## 2017-08-31 PROCEDURE — 25000003 PHARM REV CODE 250: Performed by: STUDENT IN AN ORGANIZED HEALTH CARE EDUCATION/TRAINING PROGRAM

## 2017-08-31 PROCEDURE — 99233 SBSQ HOSP IP/OBS HIGH 50: CPT | Mod: ,,, | Performed by: PSYCHIATRY & NEUROLOGY

## 2017-08-31 PROCEDURE — 97165 OT EVAL LOW COMPLEX 30 MIN: CPT

## 2017-08-31 PROCEDURE — 85007 BL SMEAR W/DIFF WBC COUNT: CPT

## 2017-08-31 PROCEDURE — 36415 COLL VENOUS BLD VENIPUNCTURE: CPT

## 2017-08-31 PROCEDURE — 84100 ASSAY OF PHOSPHORUS: CPT

## 2017-08-31 PROCEDURE — 97535 SELF CARE MNGMENT TRAINING: CPT

## 2017-08-31 PROCEDURE — 25000003 PHARM REV CODE 250: Performed by: HOSPITALIST

## 2017-08-31 PROCEDURE — 99239 HOSP IP/OBS DSCHRG MGMT >30: CPT | Mod: ,,, | Performed by: HOSPITALIST

## 2017-08-31 PROCEDURE — 83735 ASSAY OF MAGNESIUM: CPT

## 2017-08-31 PROCEDURE — 97162 PT EVAL MOD COMPLEX 30 MIN: CPT

## 2017-08-31 RX ORDER — MAGNESIUM SULFATE HEPTAHYDRATE 40 MG/ML
2 INJECTION, SOLUTION INTRAVENOUS ONCE
Status: DISCONTINUED | OUTPATIENT
Start: 2017-08-31 | End: 2017-08-31

## 2017-08-31 RX ORDER — POTASSIUM CHLORIDE 20 MEQ/1
40 TABLET, EXTENDED RELEASE ORAL DAILY
Status: DISCONTINUED | OUTPATIENT
Start: 2017-08-31 | End: 2017-08-31 | Stop reason: HOSPADM

## 2017-08-31 RX ADMIN — INSULIN ASPART 2 UNITS: 100 INJECTION, SOLUTION INTRAVENOUS; SUBCUTANEOUS at 09:08

## 2017-08-31 RX ADMIN — ATORVASTATIN CALCIUM 40 MG: 20 TABLET, FILM COATED ORAL at 09:08

## 2017-08-31 RX ADMIN — INSULIN ASPART 2 UNITS: 100 INJECTION, SOLUTION INTRAVENOUS; SUBCUTANEOUS at 11:08

## 2017-08-31 RX ADMIN — DILTIAZEM HYDROCHLORIDE 240 MG: 240 CAPSULE, COATED, EXTENDED RELEASE ORAL at 09:08

## 2017-08-31 RX ADMIN — INSULIN ASPART 12 UNITS: 100 INJECTION, SOLUTION INTRAVENOUS; SUBCUTANEOUS at 09:08

## 2017-08-31 RX ADMIN — POTASSIUM CHLORIDE 40 MEQ: 1500 TABLET, EXTENDED RELEASE ORAL at 11:08

## 2017-08-31 RX ADMIN — INSULIN ASPART 3 UNITS: 100 INJECTION, SOLUTION INTRAVENOUS; SUBCUTANEOUS at 04:08

## 2017-08-31 RX ADMIN — ASPIRIN 81 MG: 81 TABLET, COATED ORAL at 09:08

## 2017-08-31 RX ADMIN — LEVETIRACETAM 500 MG: 500 TABLET ORAL at 09:08

## 2017-08-31 RX ADMIN — SERTRALINE HYDROCHLORIDE 150 MG: 50 TABLET ORAL at 09:08

## 2017-08-31 RX ADMIN — ATENOLOL 50 MG: 25 TABLET ORAL at 09:08

## 2017-08-31 RX ADMIN — INSULIN ASPART 12 UNITS: 100 INJECTION, SOLUTION INTRAVENOUS; SUBCUTANEOUS at 04:08

## 2017-08-31 RX ADMIN — VALSARTAN 320 MG: 160 TABLET, FILM COATED ORAL at 09:08

## 2017-08-31 RX ADMIN — INSULIN ASPART 12 UNITS: 100 INJECTION, SOLUTION INTRAVENOUS; SUBCUTANEOUS at 11:08

## 2017-08-31 NOTE — SUBJECTIVE & OBJECTIVE
"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    Mixed hyperlipidemia 11/9/2012    NAFLD (nonalcoholic fatty liver disease) 10/11/2013    CHASE (nonalcoholic steatohepatitis)     Obesity     Stroke     Type 2 diabetes mellitus with diabetic polyneuropathy, with long-term current use of insulin 5/14/2017       Past Surgical History:   Procedure Laterality Date    SKIN CANCER EXCISION         Review of patient's allergies indicates:  No Known Allergies    Current Facility-Administered Medications on File Prior to Encounter   Medication    [COMPLETED] 0.9%  NaCl infusion    [COMPLETED] 0.9%  NaCl infusion    [COMPLETED] piperacillin-tazobactam 4.5 g in dextrose 5 % 100 mL IVPB (ready to mix system)    [COMPLETED] vancomycin 1 g in dextrose 5 % 250 mL IVPB (ready to mix system)     Current Outpatient Prescriptions on File Prior to Encounter   Medication Sig    aspirin (ECOTRIN) 81 MG EC tablet Take 81 mg by mouth once daily.    atenolol (TENORMIN) 50 MG tablet Take 1 tablet (50 mg total) by mouth once daily.    atorvastatin (LIPITOR) 40 MG tablet Take 1 tablet (40 mg total) by mouth once daily.    diltiaZEM (DILACOR XR) 240 MG CDCR Take 1 capsule (240 mg total) by mouth once daily.    insulin aspart (NOVOLOG) 100 unit/mL InPn pen [measured glc - 100]/50 = # units sliding scale    insulin degludec (TRESIBA FLEXTOUCH U-200) 200 unit/mL (3 mL) InPn Inject 15 Units into the skin every evening.    insulin needles, disposable, (BD ULTRA-FINE ANA PEN NEEDLES) 32 x 5/32 " Ndle Inject twice daily    levetiracetam (KEPPRA) 500 MG Tab Take 1 tablet (500 mg total) by mouth 2 (two) times daily.    metformin (GLUCOPHAGE-XR) 500 MG 24 hr tablet Take 2 tablets (1,000 mg total) by mouth 2 (two) times daily with meals.    omega-3 fatty acids-vitamin E (FISH OIL) 1,000 mg Cap Take 1 capsule by mouth 2 (two) times daily.    [START ON " 9/12/2017] predniSONE (DELTASONE) 20 MG tablet Take 1 tablet (20 mg total) by mouth once daily.    ranitidine (ZANTAC) 150 MG tablet Take 1 tablet (150 mg total) by mouth 2 (two) times daily.    sertraline (ZOLOFT) 100 MG tablet 1 and half tablet daily (Patient taking differently: Take 150 mg by mouth once daily. )    valsartan (DIOVAN) 320 MG tablet Take 1 tablet (320 mg total) by mouth once daily.    vitamin D 1000 units Tab Take 5 tablets (5,000 Units total) by mouth once daily.    liraglutide 0.6 mg/0.1 mL, 18 mg/3 mL, subq PNIJ (VICTOZA 3-TOMASZ) 0.6 mg/0.1 mL (18 mg/3 mL) PnIj Inject 1.8 mg into the skin once daily.     Family History     Problem Relation (Age of Onset)    Cancer Father    Diabetes Maternal Aunt    Heart disease Mother    Heart failure Mother    Hypertension Mother        Social History Main Topics    Smoking status: Never Smoker    Smokeless tobacco: Never Used    Alcohol use No    Drug use: No    Sexual activity: Not on file     Review of Systems   Constitutional: Positive for activity change, appetite change and fatigue. Negative for diaphoresis and fever.   HENT: Negative for tinnitus, trouble swallowing and voice change.    Eyes: Negative for photophobia, pain and visual disturbance.   Respiratory: Negative for chest tightness and shortness of breath.    Cardiovascular: Negative for chest pain.   Gastrointestinal: Negative for abdominal pain, constipation, diarrhea, nausea and vomiting.   Genitourinary: Negative for dysuria.   Musculoskeletal: Positive for gait problem. Negative for neck pain and neck stiffness.   Skin: Negative for color change and rash.   Allergic/Immunologic: Positive for immunocompromised state.   Neurological: Positive for weakness. Negative for dizziness, tremors, seizures, syncope, facial asymmetry, speech difficulty, light-headedness, numbness and headaches.   Psychiatric/Behavioral: Positive for confusion and hallucinations.     Objective:     Vital Signs  (Most Recent):  Temp: 97.7 °F (36.5 °C) (08/30/17 1539)  Pulse: 96 (08/30/17 1539)  Resp: 18 (08/30/17 1539)  BP: 121/64 (08/30/17 1539)  SpO2: 95 % (08/30/17 1539) Vital Signs (24h Range):  Temp:  [97.6 °F (36.4 °C)-99.1 °F (37.3 °C)] 97.7 °F (36.5 °C)  Pulse:  [80-96] 96  Resp:  [16-18] 18  SpO2:  [93 %-98 %] 95 %  BP: (117-164)/(64-86) 121/64     Weight: 97.3 kg (214 lb 8.1 oz)  Body mass index is 30.78 kg/m².    Physical Exam   Constitutional: He is oriented to person, place, and time. He appears well-nourished. No distress.   HENT:   Head: Normocephalic and atraumatic.   Eyes: Conjunctivae are normal. Pupils are equal, round, and reactive to light.   Neck: Normal range of motion. Neck supple.   Pulmonary/Chest: Effort normal.   Musculoskeletal: Normal range of motion.   Neurological: He is oriented to person, place, and time.   Reflex Scores:       Bicep reflexes are 1+ on the right side and 1+ on the left side.       Brachioradialis reflexes are 1+ on the right side and 1+ on the left side.       Patellar reflexes are 1+ on the right side and 1+ on the left side.  Skin: He is not diaphoretic.   Psychiatric: His speech is normal.     Recent Results (from the past 24 hour(s))   Lactic acid, plasma    Collection Time: 08/30/17 12:54 AM   Result Value Ref Range    Lactate (Lactic Acid) 3.1 (H) 0.5 - 2.2 mmol/L   Troponin I    Collection Time: 08/30/17 12:54 AM   Result Value Ref Range    Troponin I 0.029 (H) 0.000 - 0.026 ng/mL   CK-MB    Collection Time: 08/30/17 12:54 AM   Result Value Ref Range     20 - 200 U/L    CPK MB 1.2 0.1 - 6.5 ng/mL    MB% 0.9 0.0 - 5.0 %   CBC auto differential    Collection Time: 08/30/17 12:55 AM   Result Value Ref Range    WBC 1.68 (LL) 3.90 - 12.70 K/uL    RBC 4.25 (L) 4.60 - 6.20 M/uL    Hemoglobin 11.7 (L) 14.0 - 18.0 g/dL    Hematocrit 33.7 (L) 40.0 - 54.0 %    MCV 79 (L) 82 - 98 fL    MCH 27.5 27.0 - 31.0 pg    MCHC 34.7 32.0 - 36.0 g/dL    RDW 13.4 11.5 - 14.5 %     Platelets 246 150 - 350 K/uL    MPV 8.5 (L) 9.2 - 12.9 fL    Gran # 0.2 (L) 1.8 - 7.7 K/uL    Lymph # 0.9 (L) 1.0 - 4.8 K/uL    Mono # 0.6 0.3 - 1.0 K/uL    Eos # 0.0 0.0 - 0.5 K/uL    Baso # 0.03 0.00 - 0.20 K/uL    Gran% 11.3 (L) 38.0 - 73.0 %    Lymph% 53.0 (H) 18.0 - 48.0 %    Mono% 33.3 (H) 4.0 - 15.0 %    Eosinophil% 2.4 0.0 - 8.0 %    Basophil% 1.8 0.0 - 1.9 %    Differential Method Automated    Comprehensive metabolic panel    Collection Time: 08/30/17 12:55 AM   Result Value Ref Range    Sodium 140 136 - 145 mmol/L    Potassium 4.2 3.5 - 5.1 mmol/L    Chloride 102 95 - 110 mmol/L    CO2 26 23 - 29 mmol/L    Glucose 331 (H) 70 - 110 mg/dL    BUN, Bld 12 6 - 20 mg/dL    Creatinine 1.1 0.5 - 1.4 mg/dL    Calcium 9.6 8.7 - 10.5 mg/dL    Total Protein 7.1 6.0 - 8.4 g/dL    Albumin 3.2 (L) 3.5 - 5.2 g/dL    Total Bilirubin 0.7 0.1 - 1.0 mg/dL    Alkaline Phosphatase 82 55 - 135 U/L    AST 17 10 - 40 U/L    ALT 20 10 - 44 U/L    Anion Gap 12 8 - 16 mmol/L    eGFR if African American >60 >60 mL/min/1.73 m^2    eGFR if non African American >60 >60 mL/min/1.73 m^2   Blood culture    Collection Time: 08/30/17 12:55 AM   Result Value Ref Range    Blood Culture, Routine No Growth to date    Blood culture    Collection Time: 08/30/17 12:55 AM   Result Value Ref Range    Blood Culture, Routine No Growth to date    Phosphorus    Collection Time: 08/30/17 12:55 AM   Result Value Ref Range    Phosphorus 2.6 (L) 2.7 - 4.5 mg/dL   Magnesium    Collection Time: 08/30/17 12:55 AM   Result Value Ref Range    Magnesium 1.5 (L) 1.6 - 2.6 mg/dL   TSH    Collection Time: 08/30/17 12:55 AM   Result Value Ref Range    TSH 1.643 0.400 - 4.000 uIU/mL   CK    Collection Time: 08/30/17 12:55 AM   Result Value Ref Range     20 - 200 U/L   Brain natriuretic peptide    Collection Time: 08/30/17 12:55 AM   Result Value Ref Range    BNP 50 0 - 99 pg/mL   Protime-INR    Collection Time: 08/30/17 12:55 AM   Result Value Ref Range     Prothrombin Time 9.9 9.0 - 12.5 sec    INR 1.0 0.8 - 1.2   APTT    Collection Time: 08/30/17 12:55 AM   Result Value Ref Range    aPTT 23.9 21.0 - 32.0 sec   Urinalysis    Collection Time: 08/30/17  2:02 AM   Result Value Ref Range    Specimen UA Urine, Clean Catch     Color, UA Yellow Yellow, Straw, Beti    Appearance, UA Clear Clear    pH, UA 6.0 5.0 - 8.0    Specific Gravity, UA >=1.030 (A) 1.005 - 1.030    Protein, UA 2+ (A) Negative    Glucose, UA 2+ (A) Negative    Ketones, UA Negative Negative    Bilirubin (UA) Negative Negative    Occult Blood UA 1+ (A) Negative    Nitrite, UA Negative Negative    Urobilinogen, UA Negative <2.0 EU/dL    Leukocytes, UA Negative Negative   Urinalysis Microscopic    Collection Time: 08/30/17  2:02 AM   Result Value Ref Range    RBC, UA 2 0 - 4 /hpf    WBC, UA 3 0 - 5 /hpf    Bacteria, UA Occasional None-Occ /hpf    Hyaline Casts, UA 0 0-1/lpf /lpf    Microscopic Comment SEE COMMENT    PT/INR    Collection Time: 08/30/17  8:40 AM   Result Value Ref Range    Prothrombin Time 10.1 9.0 - 12.5 sec    INR 0.9 0.8 - 1.2   Procalcitonin    Collection Time: 08/30/17 12:08 PM   Result Value Ref Range    Procalcitonin 0.10 <0.25 ng/mL   POCT glucose    Collection Time: 08/30/17 12:10 PM   Result Value Ref Range    POCT Glucose 366 (H) 70 - 110 mg/dL   POCT glucose    Collection Time: 08/30/17  5:48 PM   Result Value Ref Range    POCT Glucose 297 (H) 70 - 110 mg/dL       Significant Imaging: Mild amount of opacity in the left lower lung zone which may represent atelectasis or pneumonia.

## 2017-08-31 NOTE — PROGRESS NOTES
[facility]  Neurology  Progress Note    Patient Name: Bessy Nunez  MRN: [id number]  Admission Date: 8/30/2017  Hospital Length of Stay: 1 days  Code Status: Full Code   Attending Provider: Jeremy Goddard MD  Primary Care Physician: Edgar Nova MD   Principal Problem:CNS vasculitis      Subjective:     Interval History:   No complaints overnight.   Wife says he slept all evening and majority of today  WBC count improving and no signs of active infection     Current Facility-Administered Medications   Medication Dose Route Frequency Provider Last Rate Last Dose    aspirin EC tablet 81 mg  81 mg Oral Daily NGA Jean-Baptiste   81 mg at 08/31/17 0913    atenolol tablet 50 mg  50 mg Oral Daily NGA Jean-Baptiste   50 mg at 08/31/17 0912    atorvastatin tablet 40 mg  40 mg Oral Daily NGA Jean-Baptiste   40 mg at 08/31/17 0912    dextrose 50% injection 12.5 g  12.5 g Intravenous PRN NGA Jean-Baptiste        dextrose 50% injection 25 g  25 g Intravenous PRN NGA Jean-Baptiste        diltiaZEM 24 hr capsule 240 mg  240 mg Oral Daily Louie Tafoya MD   240 mg at 08/31/17 0913    glucagon (human recombinant) injection 1 mg  1 mg Intramuscular PRN NGA Jean-Baptiste        glucose chewable tablet 16 g  16 g Oral PRN NGA Jean-Baptiste        glucose chewable tablet 24 g  24 g Oral PRN NGA Jean-Baptiste        insulin aspart pen 0-5 Units  0-5 Units Subcutaneous QID (AC + HS) PRN NGA Jean-Baptiste   3 Units at 08/31/17 1653    insulin aspart pen 12 Units  12 Units Subcutaneous TIDWM NGA Jean-Baptiste   12 Units at 08/31/17 1653    insulin detemir pen 10 Units  10 Units Subcutaneous QHS NGA Jean-Baptiste   10 Units at 08/30/17 2159    levetiracetam tablet 500 mg  500 mg Oral BID Louie Tafoya MD   500 mg at 08/31/17 0913    potassium chloride SA CR tablet 40 mEq  40 mEq Oral Daily Louie Tafoya MD   40 mEq at 08/31/17  1145    sertraline tablet 150 mg  150 mg Oral Daily Louie Tafoya MD   150 mg at 08/31/17 0912    valsartan tablet 320 mg  320 mg Oral Daily NGA Jean-Baptiste   320 mg at 08/31/17 0912     Review of Systems   Constitutional: Positive for fatigue. Negative for fever.   Respiratory: Negative for chest tightness and shortness of breath.    Cardiovascular: Negative for chest pain.   Gastrointestinal: Negative for nausea and vomiting.   Neurological: Positive for weakness. Negative for dizziness, tremors, seizures, syncope, facial asymmetry, speech difficulty, light-headedness, numbness and headaches.   Psychiatric/Behavioral: Positive for decreased concentration.     Objective:     Vital Signs (Most Recent):  Temp: 98 °F (36.7 °C) (08/31/17 1500)  Pulse: 72 (08/31/17 1500)  Resp: 18 (08/31/17 1500)  BP: 131/67 (08/31/17 1500)  SpO2: 96 % (08/31/17 1500) Vital Signs (24h Range):  Temp:  [97.5 °F (36.4 °C)-98.8 °F (37.1 °C)] 98 °F (36.7 °C)  Pulse:  [72-96] 72  Resp:  [18] 18  SpO2:  [95 %-97 %] 96 %  BP: (131-148)/(67-83) 131/67     Weight: 97.3 kg (214 lb 8.1 oz)  Body mass index is 30.78 kg/m².    Physical Exam   Constitutional: He appears well-developed and well-nourished. No distress.   HENT:   Head: Normocephalic and atraumatic.   Eyes: Conjunctivae are normal. Pupils are equal, round, and reactive to light.   Neck: Normal range of motion. Neck supple.   Pulmonary/Chest: Effort normal.   Musculoskeletal: Normal range of motion.   Skin: He is not diaphoretic.   Psychiatric: His speech is normal.     NEUROLOGICAL EXAMINATION:     MENTAL STATUS   Oriented to person.   Oriented to place. Oriented to country, city and area.   Oriented to time. Disoriented to date. Oriented to year, month, day and season.   Follows 3 step commands.   Attention: normal. Concentration: normal.   Speech: speech is normal   Level of consciousness: alert  Knowledge: good.   Normal comprehension.     CRANIAL NERVES     CN II   Visual  fields full to confrontation.     CN III, IV, VI   Pupils are equal, round, and reactive to light.  Right pupil: Shape: regular. Reactivity: brisk.   Left pupil: Shape: regular. Reactivity: brisk.   Nystagmus: none     CN V   Facial sensation intact.     CN VII   Facial expression full, symmetric.     CN VIII   CN VIII normal.     CN IX, X   Palate: symmetric    CN XI   CN XI normal.     CN XII   CN XII normal.     MOTOR EXAM   Muscle bulk: normal  Overall muscle tone: normal  Right arm pronator drift: absent  Left arm pronator drift: absent       Moving all extremities well and ambulating to restroom unassisted      SENSORY EXAM   Light touch normal.     GAIT AND COORDINATION     Tremor   Resting tremor: absent    Significant Labs:   Hemoglobin A1c: No results for input(s): HGBA1C in the last 720 hours.  Blood Culture:   Recent Labs  Lab 08/30/17 0055   LABBLOO No Growth to date  No Growth to date  No Growth to date  No Growth to date     CBC:   Recent Labs  Lab 08/30/17 0055 08/31/17  0451   WBC 1.68* 2.26*   HGB 11.7* 10.8*   HCT 33.7* 31.0*    157     CMP:   Recent Labs  Lab 08/30/17 0055 08/31/17  0450   * 199*    141   K 4.2 3.2*    105   CO2 26 26   BUN 12 8   CREATININE 1.1 0.8   CALCIUM 9.6 8.7   MG 1.5* 1.1*   PROT 7.1 6.1   ALBUMIN 3.2* 2.9*   BILITOT 0.7 0.7   ALKPHOS 82 76   AST 17 15   ALT 20 14   ANIONGAP 12 10   EGFRNONAA >60 >60.0     Inflammatory Markers:   Recent Labs  Lab 08/30/17  1208   PROCAL 0.10     Urine Culture: No results for input(s): LABURIN in the last 48 hours.  Urine Studies:   Recent Labs  Lab 08/30/17  0202   COLORU Yellow   APPEARANCEUA Clear   PHUR 6.0   SPECGRAV >=1.030*   PROTEINUA 2+*   GLUCUA 2+*   KETONESU Negative   BILIRUBINUA Negative   OCCULTUA 1+*   NITRITE Negative   UROBILINOGEN Negative   LEUKOCYTESUR Negative   RBCUA 2   WBCUA 3   BACTERIA Occasional   HYALINECASTS 0     All pertinent lab results from the past 24 hours have been  reviewed.    Significant Imaging: I have reviewed and interpreted all pertinent imaging results/findings within the past 24 hours.    Assessment and Plan:     * CNS vasculitis    CNS vasculitis diagnosed after 2 recent admissions (5/13-5/18/17) and (5/24-6/4/17). Initially presented with new onset diplopia found to have L JAYDEN. MRI Brain W WO contrast showed multiple foci of hyperintensity in deep cerebral periventricular white matter in pattern of demyelinating process, CVA was ruled out. LP was performed 5/14, CSF negative for oligoclonal bands with Normal IgG index. WBC 2.  Protein 93. No spinal cord lesions on imaging. He received IV Solumedrol x 5 days during first admission without much improvement. Admitted 5/23-6/4 with intermittent aphasia, inattention, and delayed response. EEG 5/25 (2 hr) showed moderate disorganization and slowing c/w nonspecific encephalopathy. No ictal discharges; however was discharged on Keppra 500 mg BID. Repeat MRI revealed new lesions with one lesion in R frontal lobe concerning for stroke. Vascular Neuro and General Neuro consulted. Conventional angiogram and MRA imaging of CNS vessel walls both suggestive of CNS vasculitis. Received Cytoxan 6/3 then discharged home 6/4 with home health on prednisone 60 mg daily. Saw Sharif Landaverde in Neuro clinic 7/19/17 and prednisone lowered to 40 mg daily. Wife reports patient has not received oral prednisone since ~2 weeks prior to second cytoxan infusion on 8/14/17.     This admission, patient presented to [facility] after wife was concerned for possible infection with recent leukopenia on labs and hypersomnolence, confusion, weakness and fall. Wife reports he has been confused on how to operate his Iphone, TV remote and falling asleep during conversation. Patient had unwitnessed fall last night at home in bathroom. Denies hitting head or loss of consciousness. Still experiencing some urinary incontinence and 1 episode of bowel  incontinence last night. Has been taking Keppra 500 mg BID, although no documented clear epileptic episode on prior admissions (Keppra as ppx?) and EEG 8/25/17 was negative for ictal discharges. He has not been eating or drinking well at home. After receiving IVFs, wife feels his mental status has improved, but still not at baseline.     8/31-No active infections per primary team. WBC count continues to improve. Patient able to converse and interact, but still sleeping majority of day. Was not hooked up to EEG 8/31 and planned to discharge home later today.     Recommendations:  -follow-up with Dr. Love and cytoxan #3 mid September  -outpatient EEG, will discontinue Keppra if unremarkable to limit excessive sedation/minimize medications  -continue home zoloft   -PT/OT recommend outpatient PT to improve mobility    Depressive disorder, not elsewhere classified    Continue home zoloft     VTE Risk Mitigation         Ordered     Medium Risk of VTE  Once      08/30/17 0801     Place ELIZABETH hose  Until discontinued      08/30/17 0801     Place sequential compression device  Until discontinued      08/30/17 0801        Magnolia Hebert PA-C  General Neurology Consult  Neuro Consult [facility] # [id number]

## 2017-08-31 NOTE — ASSESSMENT & PLAN NOTE
CNS vasculitis diagnosed after 2 recent admissions (5/13-5/18/17) and (5/24-6/4/17). Initially presented with new onset diplopia found to have L JAYDEN. MRI Brain W WO contrast showed multiple foci of hyperintensity in deep cerebral periventricular white matter in pattern of demyelinating process, CVA was ruled out. LP was performed 5/14, CSF negative for oligoclonal bands with Normal IgG index. WBC 2.  Protein 93. No spinal cord lesions on imaging. He received IV Solumedrol x 5 days during first admission without much improvement. Admitted 5/23-6/4 with intermittent aphasia, inattention, and delayed response. EEG 5/25 (2 hr) showed moderate disorganization and slowing c/w nonspecific encephalopathy. No ictal discharges; however was discharged on Keppra 500 mg BID. Repeat MRI revealed new lesions with one lesion in R frontal lobe concerning for stroke. Vascular Neuro and General Neuro consulted. Conventional angiogram and MRA imaging of CNS vessel walls both suggestive of CNS vasculitis. Received Cytoxan 6/3 then discharged home 6/4 with home health on prednisone 60 mg daily. Saw Sharif Landaverde in Neuro clinic 7/19/17 and prednisone lowered to 40 mg daily. Wife reports patient has not received oral prednisone since ~2 weeks prior to second cytoxan infusion on 8/14/17.     This admission, patient presented to [facility] after wife was concerned for possible infection with recent leukopenia on labs and hypersomnolence, confusion, weakness and fall. Wife reports he has been confused on how to operate his Iphone, TV remote and falling asleep during conversation. Patient had unwitnessed fall last night at home in bathroom. Denies hitting head or loss of consciousness. Still experiencing some urinary incontinence and 1 episode of bowel incontinence last night. Has been taking Keppra 500 mg BID, although no documented clear epileptic episode on prior admissions (Keppra as ppx?) and EEG 8/25/17 was negative for ictal  discharges. He has not been eating or drinking well at home. After receiving IVFs, wife feels his mental status has improved, but still not at baseline.     8/31-No active infections per primary team. WBC count continues to improve. Patient able to converse and interact, but still sleeping majority of day.   Was not hooked up to EEG 8/31 and planned to discharge.     Recommendations:  -follow-up with Dr. Love and dave #3 mid September  -outpatient EEG, will discontinue Keppra if unremarkable to limit excessive sedation/minimize medications  -continue home zoloft   -PT/OT recommend outpatient PT to improve mobility

## 2017-08-31 NOTE — ASSESSMENT & PLAN NOTE
Increased confusion, gait instability and incontinence. Patient is neutropenic but reports no fevers or sick contacts.   - continuing Keppra 500mg PO BID  - neurology consulted as patient is being treated for vasculitis vs MS, appreciate the recs  - IVF as he is reported to have decreased fluid intake and wife has noticed darker, concentrated urine.   - infection workup: UA clean, BC pending, Procalcitonin wnl, CXR showed possible PNA

## 2017-08-31 NOTE — H&P
"[facility] Medicine  History & Physical    Patient Name: Bessy Nunez  MRN: [id number]  Admission Date: 8/30/2017  Attending Physician: Jeremy Goddard MD   Primary Care Provider: Edgar Nova MD    [facility] Medicine Team: [facility] 2 Louie Tafoya MD     Patient information was obtained from patient, spouse/SO and ER records.     Subjective:     Principal Problem:CNS vasculitis    Chief Complaint: No chief complaint on file.       HPI: 57 y.o. Male with hx of HTN, HLD, DMII, depression and CNS vasculitis presented to ED 8/30/17 from home for somnolence, confusion, weakness, unsteady gait and recent leukopenia seen on lab work after cytoxan infusion (6/3/17) & (8/14/17). Wife called and spoke to MS team 8/28 complaining of "fogginess" over the last two days, inability to hold decent conversation, weakness and falls. Wife descirbes finding him in the bathroom but is unsure of whether he hit his head. Wife also reports urinary incontinence and 1 episode of bowel incontinence last night (though these have been ongoing issues since May when he was first diagnosed) Patient denies convulsions, numbness/tingling, weakness. Wife reports patient has not been eating or drinking well at home and sleeps 18 continuous hours one day. At [place] ED, he was given IVFs and wife thinks his mental status improved, but still not back to cognitive baseline. Since arrival to [facility], he has been afebrile, with leukopenia (WBC 1.68) improved from outpatient labs, elevated lactate (3.1) and CXR showed opacity in left lower lobe concerning for atelectasis vs. pneumonia. Patient denies cough, recent illness or sick contacts. Wife did not take temperature at home, but was concerned for fever because he was shivering. UA unremarkable for UTI and blood Cx and procalcitonin in process. Wife says she has not given prednisone at home since ~2 weeks prior to most recent cytoxan infusion in August and per wife was " "told to wait to resume until after final dose of cytoxan. He has been going to [facility] 2x/wk which wife feels has helped somewhat with balance.     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    Mixed hyperlipidemia 11/9/2012    NAFLD (nonalcoholic fatty liver disease) 10/11/2013    CHASE (nonalcoholic steatohepatitis)     Obesity     Stroke     Type 2 diabetes mellitus with diabetic polyneuropathy, with long-term current use of insulin 5/14/2017       Past Surgical History:   Procedure Laterality Date    SKIN CANCER EXCISION         Review of patient's allergies indicates:  No Known Allergies    Current Facility-Administered Medications on File Prior to Encounter   Medication    [COMPLETED] 0.9%  NaCl infusion    [COMPLETED] 0.9%  NaCl infusion    [COMPLETED] piperacillin-tazobactam 4.5 g in dextrose 5 % 100 mL IVPB (ready to mix system)    [COMPLETED] vancomycin 1 g in dextrose 5 % 250 mL IVPB (ready to mix system)     Current Outpatient Prescriptions on File Prior to Encounter   Medication Sig    aspirin (ECOTRIN) 81 MG EC tablet Take 81 mg by mouth once daily.    atenolol (TENORMIN) 50 MG tablet Take 1 tablet (50 mg total) by mouth once daily.    atorvastatin (LIPITOR) 40 MG tablet Take 1 tablet (40 mg total) by mouth once daily.    diltiaZEM (DILACOR XR) 240 MG CDCR Take 1 capsule (240 mg total) by mouth once daily.    insulin aspart (NOVOLOG) 100 unit/mL InPn pen [measured glc - 100]/50 = # units sliding scale    insulin degludec (TRESIBA FLEXTOUCH U-200) 200 unit/mL (3 mL) InPn Inject 15 Units into the skin every evening.    insulin needles, disposable, (BD ULTRA-FINE ANA PEN NEEDLES) 32 x 5/32 " Ndle Inject twice daily    levetiracetam (KEPPRA) 500 MG Tab Take 1 tablet (500 mg total) by mouth 2 (two) times daily.    metformin (GLUCOPHAGE-XR) 500 MG 24 hr tablet Take 2 tablets (1,000 mg total) by " mouth 2 (two) times daily with meals.    omega-3 fatty acids-vitamin E (FISH OIL) 1,000 mg Cap Take 1 capsule by mouth 2 (two) times daily.    [START ON 9/12/2017] predniSONE (DELTASONE) 20 MG tablet Take 1 tablet (20 mg total) by mouth once daily.    ranitidine (ZANTAC) 150 MG tablet Take 1 tablet (150 mg total) by mouth 2 (two) times daily.    sertraline (ZOLOFT) 100 MG tablet 1 and half tablet daily (Patient taking differently: Take 150 mg by mouth once daily. )    valsartan (DIOVAN) 320 MG tablet Take 1 tablet (320 mg total) by mouth once daily.    vitamin D 1000 units Tab Take 5 tablets (5,000 Units total) by mouth once daily.    liraglutide 0.6 mg/0.1 mL, 18 mg/3 mL, subq PNIJ (VICTOZA 3-TOMASZ) 0.6 mg/0.1 mL (18 mg/3 mL) PnIj Inject 1.8 mg into the skin once daily.     Family History     Problem Relation (Age of Onset)    Cancer Father    Diabetes Maternal Aunt    Heart disease Mother    Heart failure Mother    Hypertension Mother        Social History Main Topics    Smoking status: Never Smoker    Smokeless tobacco: Never Used    Alcohol use No    Drug use: No    Sexual activity: Not on file     Review of Systems   Constitutional: Positive for activity change, appetite change and fatigue. Negative for diaphoresis and fever.   HENT: Negative for tinnitus, trouble swallowing and voice change.    Eyes: Negative for photophobia, pain and visual disturbance.   Respiratory: Negative for chest tightness and shortness of breath.    Cardiovascular: Negative for chest pain.   Gastrointestinal: Negative for abdominal pain, constipation, diarrhea, nausea and vomiting.   Genitourinary: Negative for dysuria.   Musculoskeletal: Positive for gait problem. Negative for neck pain and neck stiffness.   Skin: Negative for color change and rash.   Allergic/Immunologic: Positive for immunocompromised state.   Neurological: Positive for weakness. Negative for dizziness, tremors, seizures, syncope, facial asymmetry, speech  difficulty, light-headedness, numbness and headaches.   Psychiatric/Behavioral: Positive for confusion and hallucinations.     Objective:     Vital Signs (Most Recent):  Temp: 97.7 °F (36.5 °C) (08/30/17 1539)  Pulse: 96 (08/30/17 1539)  Resp: 18 (08/30/17 1539)  BP: 121/64 (08/30/17 1539)  SpO2: 95 % (08/30/17 1539) Vital Signs (24h Range):  Temp:  [97.6 °F (36.4 °C)-99.1 °F (37.3 °C)] 97.7 °F (36.5 °C)  Pulse:  [80-96] 96  Resp:  [16-18] 18  SpO2:  [93 %-98 %] 95 %  BP: (117-164)/(64-86) 121/64     Weight: 97.3 kg (214 lb 8.1 oz)  Body mass index is 30.78 kg/m².    Physical Exam   Constitutional: He is oriented to person, place, and time. He appears well-nourished. No distress.   HENT:   Head: Normocephalic and atraumatic.   Eyes: Conjunctivae are normal. Pupils are equal, round, and reactive to light.   Neck: Normal range of motion. Neck supple.   Pulmonary/Chest: Effort normal.   Musculoskeletal: Normal range of motion.   Neurological: He is oriented to person, place, and time.   Reflex Scores:       Bicep reflexes are 1+ on the right side and 1+ on the left side.       Brachioradialis reflexes are 1+ on the right side and 1+ on the left side.       Patellar reflexes are 1+ on the right side and 1+ on the left side.  Skin: He is not diaphoretic.   Psychiatric: His speech is normal.     Recent Results (from the past 24 hour(s))   Lactic acid, plasma    Collection Time: 08/30/17 12:54 AM   Result Value Ref Range    Lactate (Lactic Acid) 3.1 (H) 0.5 - 2.2 mmol/L   Troponin I    Collection Time: 08/30/17 12:54 AM   Result Value Ref Range    Troponin I 0.029 (H) 0.000 - 0.026 ng/mL   CK-MB    Collection Time: 08/30/17 12:54 AM   Result Value Ref Range     20 - 200 U/L    CPK MB 1.2 0.1 - 6.5 ng/mL    MB% 0.9 0.0 - 5.0 %   CBC auto differential    Collection Time: 08/30/17 12:55 AM   Result Value Ref Range    WBC 1.68 (LL) 3.90 - 12.70 K/uL    RBC 4.25 (L) 4.60 - 6.20 M/uL    Hemoglobin 11.7 (L) 14.0 - 18.0  g/dL    Hematocrit 33.7 (L) 40.0 - 54.0 %    MCV 79 (L) 82 - 98 fL    MCH 27.5 27.0 - 31.0 pg    MCHC 34.7 32.0 - 36.0 g/dL    RDW 13.4 11.5 - 14.5 %    Platelets 246 150 - 350 K/uL    MPV 8.5 (L) 9.2 - 12.9 fL    Gran # 0.2 (L) 1.8 - 7.7 K/uL    Lymph # 0.9 (L) 1.0 - 4.8 K/uL    Mono # 0.6 0.3 - 1.0 K/uL    Eos # 0.0 0.0 - 0.5 K/uL    Baso # 0.03 0.00 - 0.20 K/uL    Gran% 11.3 (L) 38.0 - 73.0 %    Lymph% 53.0 (H) 18.0 - 48.0 %    Mono% 33.3 (H) 4.0 - 15.0 %    Eosinophil% 2.4 0.0 - 8.0 %    Basophil% 1.8 0.0 - 1.9 %    Differential Method Automated    Comprehensive metabolic panel    Collection Time: 08/30/17 12:55 AM   Result Value Ref Range    Sodium 140 136 - 145 mmol/L    Potassium 4.2 3.5 - 5.1 mmol/L    Chloride 102 95 - 110 mmol/L    CO2 26 23 - 29 mmol/L    Glucose 331 (H) 70 - 110 mg/dL    BUN, Bld 12 6 - 20 mg/dL    Creatinine 1.1 0.5 - 1.4 mg/dL    Calcium 9.6 8.7 - 10.5 mg/dL    Total Protein 7.1 6.0 - 8.4 g/dL    Albumin 3.2 (L) 3.5 - 5.2 g/dL    Total Bilirubin 0.7 0.1 - 1.0 mg/dL    Alkaline Phosphatase 82 55 - 135 U/L    AST 17 10 - 40 U/L    ALT 20 10 - 44 U/L    Anion Gap 12 8 - 16 mmol/L    eGFR if African American >60 >60 mL/min/1.73 m^2    eGFR if non African American >60 >60 mL/min/1.73 m^2   Blood culture    Collection Time: 08/30/17 12:55 AM   Result Value Ref Range    Blood Culture, Routine No Growth to date    Blood culture    Collection Time: 08/30/17 12:55 AM   Result Value Ref Range    Blood Culture, Routine No Growth to date    Phosphorus    Collection Time: 08/30/17 12:55 AM   Result Value Ref Range    Phosphorus 2.6 (L) 2.7 - 4.5 mg/dL   Magnesium    Collection Time: 08/30/17 12:55 AM   Result Value Ref Range    Magnesium 1.5 (L) 1.6 - 2.6 mg/dL   TSH    Collection Time: 08/30/17 12:55 AM   Result Value Ref Range    TSH 1.643 0.400 - 4.000 uIU/mL   CK    Collection Time: 08/30/17 12:55 AM   Result Value Ref Range     20 - 200 U/L   Brain natriuretic peptide    Collection Time:  08/30/17 12:55 AM   Result Value Ref Range    BNP 50 0 - 99 pg/mL   Protime-INR    Collection Time: 08/30/17 12:55 AM   Result Value Ref Range    Prothrombin Time 9.9 9.0 - 12.5 sec    INR 1.0 0.8 - 1.2   APTT    Collection Time: 08/30/17 12:55 AM   Result Value Ref Range    aPTT 23.9 21.0 - 32.0 sec   Urinalysis    Collection Time: 08/30/17  2:02 AM   Result Value Ref Range    Specimen UA Urine, Clean Catch     Color, UA Yellow Yellow, Straw, Beti    Appearance, UA Clear Clear    pH, UA 6.0 5.0 - 8.0    Specific Gravity, UA >=1.030 (A) 1.005 - 1.030    Protein, UA 2+ (A) Negative    Glucose, UA 2+ (A) Negative    Ketones, UA Negative Negative    Bilirubin (UA) Negative Negative    Occult Blood UA 1+ (A) Negative    Nitrite, UA Negative Negative    Urobilinogen, UA Negative <2.0 EU/dL    Leukocytes, UA Negative Negative   Urinalysis Microscopic    Collection Time: 08/30/17  2:02 AM   Result Value Ref Range    RBC, UA 2 0 - 4 /hpf    WBC, UA 3 0 - 5 /hpf    Bacteria, UA Occasional None-Occ /hpf    Hyaline Casts, UA 0 0-1/lpf /lpf    Microscopic Comment SEE COMMENT    PT/INR    Collection Time: 08/30/17  8:40 AM   Result Value Ref Range    Prothrombin Time 10.1 9.0 - 12.5 sec    INR 0.9 0.8 - 1.2   Procalcitonin    Collection Time: 08/30/17 12:08 PM   Result Value Ref Range    Procalcitonin 0.10 <0.25 ng/mL   POCT glucose    Collection Time: 08/30/17 12:10 PM   Result Value Ref Range    POCT Glucose 366 (H) 70 - 110 mg/dL   POCT glucose    Collection Time: 08/30/17  5:48 PM   Result Value Ref Range    POCT Glucose 297 (H) 70 - 110 mg/dL       Significant Imaging: Mild amount of opacity in the left lower lung zone which may represent atelectasis or pneumonia.    Assessment/Plan:     Altered mental status    - see CNS vasculitis          * CNS vasculitis    Increased confusion, gait instability and incontinence. Patient is neutropenic but reports no fevers or sick contacts.   - continuing Keppra 500mg PO BID  - Holding  prednisone as per wife it was to be resumed after third treatment.  - neurology consulted as patient is being treated for vasculitis vs MS, appreciate the recs  - IVF as he is reported to have decreased fluid intake and wife has noticed darker, concentrated urine, with improvement of mental status after fluids given in [place].  - infection workup: UA clean, BC pending, Procalcitonin wnl, CXR showed possible PNA   - EEG w/ discontinuing of keppra if no seizure activity per neurology        Chemotherapy-induced neutropenia    - daily CBC  - infection workup pending      Hypomagnesemia    - Mg 2g replacement IV today      Type 2 diabetes mellitus with hyperglycemia, with long-term current use of insulin    - detemir 10 U   - aspart 12U       Essential hypertension    - diltiazem 240 mg PO QD  - valsartan 320mg PO QD        Mixed hyperlipidemia    - artovastatin 40 mg PO QD      Depressive disorder, not elsewhere classified    - continuing zoloft home dose        VTE Risk Mitigation         Ordered     Medium Risk of VTE  Once      08/30/17 0801     Place ELIZABETH hose  Until discontinued      08/30/17 0801     Place sequential compression device  Until discontinued      08/30/17 0801             Louie Tafoya MD  Department of Hospital Medicine   [facility]

## 2017-08-31 NOTE — NURSING
Pt given all discharge information. Pt and wife verbalize understanding of all instructions, medication, and follow-up appointments. Glucose monitoring and insulin administration education reviewed. Pt is awaiting WC for discharge. No s/s of distress at time of discharge.

## 2017-08-31 NOTE — PLAN OF CARE
Problem: Patient Care Overview  Goal: Plan of Care Review  Outcome: Ongoing (interventions implemented as appropriate)  Patient remains free from falls and injury this shift. Bed in low, locked position with call bell in reach. Family at bedside. Patient encouraged to call for assistance when getting out of bed. Patient verbalized understanding. All belongings within reach. VS stable. Afebrile. BG monitored ACHS insulin given as needed. Pt AAOx3 states the month and year as August, 2-17? Will continue to monitor.

## 2017-08-31 NOTE — HPI
"57 y.o. Male with hx of HTN, HLD, DMII, depression and CNS vasculitis presented to ED 8/30/17 from home for somnolence, confusion, weakness, unsteady gait and recent leukopenia seen on lab work after cytoxan infusion (6/3/17) & (8/14/17). Wife called and spoke to MS team 8/28 complaining of "fogginess" over the last two days, inability to hold decent conversation, weakness and falls. Wife descirbes finding him in the bathroom but is unsure of whether he hit his head. Wife also reports urinary incontinence and 1 episode of bowel incontinence last night (though these have been ongoing issues since May when he was first diagnosed) Patient denies convulsions, numbness/tingling, weakness. Wife reports patient has not been eating or drinking well at home and sleeps 18 continuous hours one day. At [facility] ED, he was given IVFs and wife thinks his mental status improved, but still not back to cognitive baseline. Since arrival to [facility], he has been afebrile, with leukopenia (WBC 1.68) improved from outpatient labs, elevated lactate (3.1) and CXR showed opacity in left lower lobe concerning for atelectasis vs. pneumonia. Patient denies cough, recent illness or sick contacts. Wife did not take temperature at home, but was concerned for fever because he was shivering. UA unremarkable for UTI and blood Cx and procalcitonin in process. Wife says she has not given prednisone at home since ~2 weeks prior to most recent cytoxan infusion in August and per wife was told to wait to resume until after final dose of cytoxan. He has been going to [facility] 2x/wk which wife feels has helped somewhat with balance.   "

## 2017-08-31 NOTE — HOSPITAL COURSE
8/30 - admitted for AMS. Neurology consulted and feel dehydration is a likely contributing factor. Infection workup in process. BC pending. UA clean. Procalcitonin wnl.   8/31 - BC NGTD. Patient remains afebrile. EEG was to be completed inpatient but due to priority EEGs for N[facility], patient was unable to obtain today. After discussing with neurology it was felt that the EEG could be completed o/p and f/u with Dr. Love for cytoxin treatment in September. Given patient did not show signs of active infection and his neutropenic status, it was felt his mental status was not severely off baseline to warrant continued risk of exposing him to a nosocomial infection.

## 2017-08-31 NOTE — PLAN OF CARE
Problem: Occupational Therapy Goal  Goal: Occupational Therapy Goal  Goals to be met by: 9/14/17     Patient will increase functional independence with ADLs by performing:    Grooming while standing at sink with Modified [place].  Toileting from toilet with Modified [place] for hygiene and clothing management.   Toilet transfer to toilet with Modified [place].  Pt will perform functional reaching tasks in dynamic planes in standing for x 5 min with less than 2 seated rest breaks and less than x 3 LOB.      Outcome: Ongoing (interventions implemented as appropriate)  Initial eval completed   POC implemented, goals set     Yuliet Gurrola OT  8/31/2017

## 2017-08-31 NOTE — PLAN OF CARE
Problem: Physical Therapy Goal  Goal: Physical Therapy Goal  Goals to be met by: 9/10/17     Patient will increase functional independence with mobility by performin. Supine to sit with Modified [place]  2. Sit to supine with Modified [place]  3. Sit to stand transfer with Modified [place]  4. Bed to chair transfer with Supervision   5. Gait  x 200 feet with Supervision using LRAD  6. Stand for 5 minutes with SBA during dynamic activities.     Outcome: Ongoing (interventions implemented as appropriate)  PT evaluation complete and goals established.    Dayana Fine DPT, PT  2017

## 2017-08-31 NOTE — PT/OT/SLP EVAL
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    Mixed hyperlipidemia 11/9/2012    NAFLD (nonalcoholic fatty liver disease) 10/11/2013    CHASE (nonalcoholic steatohepatitis)     Obesity     Stroke     Type 2 diabetes mellitus with diabetic polyneuropathy, with long-term current use of insulin 5/14/2017      Past Surgical History:   Procedure Laterality Date    SKIN CANCER EXCISION         Referring physician: NGA Jean-Baptiste  Date referred to PT: 8/3017    General Precautions: Standard, fall  Orthopedic Precautions: N/A   Braces: N/A       Do you have any cultural, spiritual, Yarsani conflicts, given your current situation?: None stated     Patient History:  Lives With: spouse  Living Arrangements: house  Home Accessibility: stairs within home  Home Layout: Able to live on 1st floor  Number of Stairs Within Home:  (full flight)  Stair Railings at Home: inside, present at both sides  Living Environment Comment: Pt reports living with wife in two-story house with 0 AMANDA. Bedroom and bathroom located on the 1st floor. Bathroom has tub/shower combo with grab bars. PTA, pt was (I) with functional mobility or occassionally used QC. Pt was (I) with ADLs, driving, and working. Pt attends outpatient PT for imbalance/instability. Pt reprots 4 falls within the last 3 months in the bathroom in which wife had to assist him back to upright position. Pt's wife able to provide 24/7 assistance.   Equipment Currently Used at Home: none    Previous Level of Function:  Ambulation Skills: independent  Transfer  "Skills: independent  ADL Skills: independent  Work/Leisure Activity: independent    Subjective:  Communicated with RN prior to session.  Pt agreeable to PT evaluation. Pt states "I fall when I'm standing at the sink"  Chief Complaint: imbalance   Patient goals: return home     Pain/Comfort  Pain Rating 1: 0/10  Pain Rating Post-Intervention 1: 0/10    Objective:   Patient found with: PICC line, telemetry     Cognitive Exam:  Oriented to: Person, Place and Time    Follows Commands/attention: Follows two-step commands  Communication: clear/fluent  Safety awareness/insight to disability: impaired    Physical Exam:  Postural examination/scapula alignment: Rounded shoulder and Head forward    Skin integrity: Visible skin intact  Edema: None noted in BLE    Sensation:   Intact    Lower Extremity Range of Motion:  Right Lower Extremity: WFL  Left Lower Extremity: WFL    Lower Extremity Strength:  Right Lower Extremity: WFL except hip flexion 4-/5  Left Lower Extremity: WFL except hip flexion 4-/5     Fine motor coordination:  Intact    Gross motor coordination: WFL    Functional Mobility:  Bed Mobility:  Rolling/Turning Right: Stand by assistance  Scooting/Bridging: Stand by Assistance  Supine to Sit: Stand by Assistance, With side rail  Sit to Supine:  (Pt [facility] [place])    Transfers:  Sit <> Stand Assistance: Contact Guard Assistance  Sit <> Stand Assistive Device: No Assistive Device  Bed <> Chair Technique: Stand Pivot  Bed <> Chair Assistance: Stand By Assistance  Bed <> Chair Assistive Device: No Assistive Device  Toilet Transfer Technique: Stand Pivot  Toilet Transfer Assistance: Contact Guard Assistance  Toilet Transfer Assistive Device:  (grab bar)    Gait:   Gait Distance: 100'. Initially required CGA but progressed to SBA. Verbal cues for upright posture and safety.   Assistance 1: Contact Guard Assistance, Stand by Assistance  Gait Assistive Device: No device  Gait Pattern: reciprocal  Gait Deviation(s): decreased " justin, decreased step length, decreased stride length    Stairs: did not occur     Balance:   Static Sit: GOOD: Takes MODERATE challenges from all directions  Dynamic Sit: GOOD: Maintains balance through MODERATE excursions of active trunk movement  Static Stand: GOOD-: Takes MODERATE challenges from all directions inconsistently  Dynamic stand: POOR: N/A    Therapeutic Activities and Exercises:  Therapeutic activities aimed to increase pt's independence, safety, and efficiency with bed mobility and functional transfers. See above for assistance levels.   · Toilet transfer: CGA using grab bars. Verbal cues for safety. Static stand balance in restroom x 5 minutes to complete self care with SBA    Balance  Feet together eyes open: SBA x 30 seconds; minimal sway   Feet together eyes closed: SBAx 30 seconds; moderate sway   Tandem: SBA assistance to obtain position (unable to obtain full tandem position) and CGA to maintain position x 5 seconds until experienced significant LOB require mod/max A to recover  SLS: Mod assistance x 5 seconds on RLE    Pt educated on role of PT and POC/goals for therapy as well as safety with mobility. Pt verbalized understanding. Pt expressed no further concerns/questions.     AM-PAC 6 CLICK MOBILITY  How much help from another person does this patient currently need?   1 = Unable, Total/Dependent Assistance  2 = A lot, Maximum/Moderate Assistance  3 = A little, Minimum/Contact Guard/Supervision  4 = None, Modified [place]/Independent    Turning over in bed (including adjusting bedclothes, sheets and blankets)?: 4  Sitting down on and standing up from a chair with arms (e.g., wheelchair, bedside commode, etc.): 3  Moving from lying on back to sitting on the side of the bed?: 3  Moving to and from a bed to a chair (including a wheelchair)?: 3  Need to walk in hospital room?: 3  Climbing 3-5 steps with a railing?: 3  Total Score: 19     AM-PAC Raw Score CMS G-Code Modifier Level of  Impairment Assistance   6 % Total / Unable   7 - 9 CM 80 - 100% Maximal Assist   10 - 14 CL 60 - 80% Moderate Assist   15 - 19 CK 40 - 60% Moderate Assist   20 - 22 CJ 20 - 40% Minimal Assist   23 CI 1-20% SBA / CGA   24 CH 0% Independent/ Mod I     Patient left up in chair with all lines intact, call button in reach and RN notified.    Assessment:   Bessy Nunez is a 57 y.o. male with a medical diagnosis of CNS vasculitis. Upon initial PT evaluation, pt presents with weakness, impaired endurance, impaired self care skills, impaired functional mobility, gait instability, impaired balance, decreased coordination, and decreased safety awareness. Pt would benefit from skilled PT services to address these deficits and improve return to PLOF. Anticipate d/c to outpatient PT.     Rehab identified problem list/impairments: weakness, impaired endurance, impaired self care skills, impaired functional mobilty, gait instability, impaired balance, decreased coordination, decreased safety awareness    Rehab potential is good.    Activity tolerance: Good    Discharge recommendations: Discharge Facility/Level Of Care Needs: outpatient PT     Barriers to discharge: Barriers to Discharge: None    Equipment recommendations: Equipment Needed After Discharge: none     GOALS:    Physical Therapy Goals        Problem: Physical Therapy Goal    Goal Priority Disciplines Outcome Goal Variances Interventions   Physical Therapy Goal     PT/OT, PT Ongoing (interventions implemented as appropriate)     Description:  Goals to be met by: 9/10/17     Patient will increase functional independence with mobility by performin. Supine to sit with Modified [place]  2. Sit to supine with Modified [place]  3. Sit to stand transfer with Modified [place]  4. Bed to chair transfer with Supervision   5. Gait  x 200 feet with Supervision using LRAD  6. Stand for 5 minutes with SBA during dynamic activities.                        PLAN:    Patient to be seen 3 x/week to address the above listed problems via gait training, therapeutic activities, therapeutic exercises, neuromuscular re-education  Plan of Care expires: 09/30/17  Plan of Care reviewed with: patient, spouse          Dayana ANDI Rody, PT  08/31/2017

## 2017-08-31 NOTE — PT/OT/SLP EVAL
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    Mixed hyperlipidemia 11/9/2012    NAFLD (nonalcoholic fatty liver disease) 10/11/2013    CHASE (nonalcoholic steatohepatitis)     Obesity     Stroke     Type 2 diabetes mellitus with diabetic polyneuropathy, with long-term current use of insulin 5/14/2017      Past Surgical History:   Procedure Laterality Date    SKIN CANCER EXCISION         Referring physician: NGA Jean-Baptiste  Date referred to OT: 8/30/17    General Precautions: Standard, fall, neutropenic  Orthopedic Precautions: N/A  Braces: N/A    Do you have any cultural, spiritual, Hinduism conflicts, given your current situation?: None stated     Patient History:  Living Environment  Lives With: spouse  Living Arrangements: house  Home Accessibility: stairs within home  Home Layout: Able to live on 1st floor  Number of Stairs Within Home:  (flight of stairs)  Stair Railings at Home: inside, present at both sides  Transportation Available: family or friend will provide  Living Environment Comment: Pt reports he lives with wife in a 2 ST with no AMANDA. Bed/Bathroom on 1st level of home, pt states he occassionally goes to 2nd level of home. Pt has a tub/shower combo with a grab bar and shower chair. Pt reports he was (I) with functional mobility and required assistance from wife with LB dressing. Pt was dring and working PTA. Pt's wife reports that pt has had about 4 falls in the last few months in the bathroom when getting off the toilet and out of the shower. Pt's wife has had to assist pt with getting up in  "which is difficutly at times for wife. Pt's wife does not work and is able to assist pt upon d/c.    Equipment Currently Used at Home: cane, quad, grab bar, shower chair    Prior level of function:   Bed Mobility/Transfers: independent  Grooming: independent  Bathing: independent  Upper Body Dressing: independent  Lower Body Dressing: needs assist  Toileting: independent  Driving License: Yes  Mode of Transportation: Car, Family     Dominant hand: right    Subjective:  Communicated with RN prior to session.  " I've fallen a lot"  Chief Complaint: imbalance,falls  Patient/Family stated goals: to return home     Pain/Comfort  Pain Rating 1: 0/10  Pain Rating Post-Intervention 1: 0/10    Objective:  Patient found with: PICC line, telemetry    Cognitive Exam:  Oriented to: Person, Place and Time  Follows Commands/attention: Follows two-step commands  Communication: clear/fluent  Memory:  No Deficits noted and pt required cueing and redirection to task during session   Safety awareness/insight to disability: impaired  Coping skills/emotional control: Appropriate to situation    Visual/perceptual:  Not formally assessed     Physical Exam:  Postural examination/scapula alignment: Rounded shoulder and Head forward  Skin integrity: Visible skin intact  Edema: None noted BUEs    Sensation:   Intact  light/touch B UEs    Upper Extremity Range of Motion:  Right Upper Extremity: WFL  Left Upper Extremity: WFL    Upper Extremity Strength:  Right Upper Extremity: WFL  Left Upper Extremity: WFL   Strength: B  strength 5/5     Fine motor coordination:   Intact  Left hand thumb/finger opposition skills and Right hand thumb/finger opposition skills    Gross motor coordination: WFL    Functional Mobility:  Bed Mobility:  Scooting/Bridging: Stand by Assistance  Supine to Sit: Supervision, WIth side rail  Sit to Supine:  (Pt left UIC)    Transfers:  Sit <> Stand Assistance: Contact Guard Assistance  Sit <> Stand Assistive Device: " No Assistive Device  Bed <> Chair Technique: Stand Pivot  Bed <> Chair Transfer Assistance: Contact Guard Assistance (functional ambulation with CGA for safety )  Bed <> Chair Assistive Device: No Assistive Device  Toilet Transfer Technique: Stand Pivot  Toilet Transfer Assistance: Contact Guard Assistance  Toilet Transfer Assistive Device: grab bar (v/c's for safety and hand placement )    Functional Ambulation: Pt performed functional ambulation with CGA no AD. See PT note for further assessment of gait and balance deficits.     Activities of Daily Living:     UE Dressing Level of Assistance:  (Not assessed; pt dressed in personal clothing and preferred not to dress in gown)    LE Dressing Level of Assistance: Set-up Assistance (Sitting EOB donned B  socks. Pt required redirection to task at times due to inattention. )    Grooming Position: Standing at sink (Pt completed oral care, face washing, and hand hygiene standing at sink. Pt noted to lean into sink for steadying balance and stability. Pt noted to rest L hand on sink for stability')  Grooming Level of Assistance: Contact guard assistance     Toileting Where Assessed: Toilet (Simulated )  Toileting Level of Assistance: Contact guard        Balance:   Static Sit: GOOD: Takes MODERATE challenges from all directions  Dynamic Sit: GOOD: Maintains balance through MODERATE excursions of active trunk movement  Static Stand: GOOD-: Takes MODERATE challenges from all directions inconsistently  Dynamic stand: POOR: N/A    Therapeutic Activities and Exercises:  Pt educated by therapist on:   - Pt educated on role of OT, POC, and goals for therapy.     - Importance of OOB ax's with staff member assistance   - Pt completed ADLs and functional mobility for treatment session as noted above   - Pt educated on safety throughout functional transfer training and hand placement when completing functional transfers.   -Pt verbalized understanding. Pt expressed no further  "concerns/questions.      AM-PAC 6 CLICK ADL  How much help from another person does this patient currently need?  1 = Unable, Total/Dependent Assistance  2 = A lot, Maximum/Moderate Assistance  3 = A little, Minimum/Contact Guard/Supervision  4 = None, Modified [place]/Independent    Putting on and taking off regular lower body clothing? : 3  Bathing (including washing, rinsing, drying)?: 3  Toileting, which includes using toilet, bedpan, or urinal? : 3  Putting on and taking off regular upper body clothing?: 3  Taking care of personal grooming such as brushing teeth?: 3  Eating meals?: 3  Total Score: 18    AM-PAC Raw Score CMS "G-Code Modifier Level of Impairment Assistance   6 % Total / Unable   7 - 9 CM 80 - 100% Maximal Assist   10-14 CL 60 - 80% Moderate Assist   15 - 19 CK 40 - 60% Moderate Assist   20 - 22 CJ 20 - 40% Minimal Assist   23 CI 1-20% SBA / CGA   24 CH 0% Independent/ Mod I       Patient left up in chair with all lines intact, call button in reach and wife  present    Assessment:  Bessy Nunez is a 57 y.o. male with a medical diagnosis of CNS vasculitis. Pt tolerated session well this date. Pt presents with impaired balance, gait deficits, imbalance and coordination deficts, decreased functional mobility, and decreased self-care skills. Pt's balance and coordination deficits inhibits pt ability to successfully perform functional mobility and self-care skills independently and safely. Pt's wife reports that pt has fallen at least 4 times in the last 3 months with occurences in the bathroom in which pt's wife has had to assist pt off the floor. Pt will continue to benefit from skilled OT in order to maximize pt's safety and (I) with functional mobility, functional tasks in dynamic planes and self-care skills. Pt would benefit from OP PT in order to address gait, imbalance and coordination deficits. Pt's wife is able to provide 24 hr care and supervision.     Rehab identified problem " list/impairments: Rehab identified problem list/impairments: weakness, impaired endurance, impaired self care skills, impaired balance, gait instability, impaired functional mobilty, decreased coordination, decreased safety awareness    Rehab potential is good.    Activity tolerance: Good    Discharge recommendations: Discharge Facility/Level Of Care Needs: outpatient PT (to address balance and coordination deficits )     Barriers to discharge: Barriers to Discharge: None    Equipment recommendations: none     GOALS:    Occupational Therapy Goals        Problem: Occupational Therapy Goal    Goal Priority Disciplines Outcome Interventions   Occupational Therapy Goal     OT, PT/OT Ongoing (interventions implemented as appropriate)    Description:  Goals to be met by: 9/14/17     Patient will increase functional independence with ADLs by performing:    Grooming while standing at sink with Modified [place].  Toileting from toilet with Modified [place] for hygiene and clothing management.   Toilet transfer to toilet with Modified [place].  Pt will perform functional reaching tasks in dynamic planes in standing for x 5 min with less than 2 seated rest breaks and less than x 3 LOB.                        PLAN:  Patient to be seen 3 x/week to address the above listed problems via self-care/home management, therapeutic activities, therapeutic exercises, community/work re-entry  Plan of Care expires: 09/30/17  Plan of Care reviewed with: patient, spouse         Yuliet ESCAMILLA Galileo, OT  08/31/2017

## 2017-08-31 NOTE — SUBJECTIVE & OBJECTIVE
Subjective:     Interval History:   No complaints overnight.   Wife says he slept all evening and majority of today  WBC count improving and no signs of active infection     Current Facility-Administered Medications   Medication Dose Route Frequency Provider Last Rate Last Dose    aspirin EC tablet 81 mg  81 mg Oral Daily NGA Jean-Baptiste   81 mg at 08/31/17 0913    atenolol tablet 50 mg  50 mg Oral Daily NGA Jean-Baptiste   50 mg at 08/31/17 0912    atorvastatin tablet 40 mg  40 mg Oral Daily NGA Jean-Baptiste   40 mg at 08/31/17 0912    dextrose 50% injection 12.5 g  12.5 g Intravenous PRN NGA Jean-Baptiste        dextrose 50% injection 25 g  25 g Intravenous PRN NGA Jean-Baptiste        diltiaZEM 24 hr capsule 240 mg  240 mg Oral Daily Louie Tafoya MD   240 mg at 08/31/17 0913    glucagon (human recombinant) injection 1 mg  1 mg Intramuscular PRN NGA Jean-Baptiste        glucose chewable tablet 16 g  16 g Oral PRN NGA Jean-Baptiste        glucose chewable tablet 24 g  24 g Oral PRN NGA Jean-Baptiste        insulin aspart pen 0-5 Units  0-5 Units Subcutaneous QID (AC + HS) PRN NGA Jean-Baptiste   3 Units at 08/31/17 1653    insulin aspart pen 12 Units  12 Units Subcutaneous TIDWM NGA Jean-Baptiste   12 Units at 08/31/17 1653    insulin detemir pen 10 Units  10 Units Subcutaneous QHS NGA Jean-Baptiste   10 Units at 08/30/17 2159    levetiracetam tablet 500 mg  500 mg Oral BID Louie Tafoya MD   500 mg at 08/31/17 0913    potassium chloride SA CR tablet 40 mEq  40 mEq Oral Daily Louie Tafoya MD   40 mEq at 08/31/17 1145    sertraline tablet 150 mg  150 mg Oral Daily Louie Tafoya MD   150 mg at 08/31/17 0912    valsartan tablet 320 mg  320 mg Oral Daily NGA Jean-Baptiste   320 mg at 08/31/17 0912     Review of Systems   Constitutional: Positive for fatigue. Negative for fever.   Respiratory: Negative for chest  tightness and shortness of breath.    Cardiovascular: Negative for chest pain.   Gastrointestinal: Negative for nausea and vomiting.   Neurological: Positive for weakness. Negative for dizziness, tremors, seizures, syncope, facial asymmetry, speech difficulty, light-headedness, numbness and headaches.   Psychiatric/Behavioral: Positive for decreased concentration.     Objective:     Vital Signs (Most Recent):  Temp: 98 °F (36.7 °C) (08/31/17 1500)  Pulse: 72 (08/31/17 1500)  Resp: 18 (08/31/17 1500)  BP: 131/67 (08/31/17 1500)  SpO2: 96 % (08/31/17 1500) Vital Signs (24h Range):  Temp:  [97.5 °F (36.4 °C)-98.8 °F (37.1 °C)] 98 °F (36.7 °C)  Pulse:  [72-96] 72  Resp:  [18] 18  SpO2:  [95 %-97 %] 96 %  BP: (131-148)/(67-83) 131/67     Weight: 97.3 kg (214 lb 8.1 oz)  Body mass index is 30.78 kg/m².    Physical Exam   Constitutional: He appears well-developed and well-nourished. No distress.   HENT:   Head: Normocephalic and atraumatic.   Eyes: Conjunctivae are normal. Pupils are equal, round, and reactive to light.   Neck: Normal range of motion. Neck supple.   Pulmonary/Chest: Effort normal.   Musculoskeletal: Normal range of motion.   Skin: He is not diaphoretic.   Psychiatric: His speech is normal.     NEUROLOGICAL EXAMINATION:     MENTAL STATUS   Oriented to person.   Oriented to place. Oriented to country, city and area.   Oriented to time. Disoriented to date. Oriented to year, month, day and season.   Follows 3 step commands.   Attention: normal. Concentration: normal.   Speech: speech is normal   Level of consciousness: alert  Knowledge: good.   Normal comprehension.     CRANIAL NERVES     CN II   Visual fields full to confrontation.     CN III, IV, VI   Pupils are equal, round, and reactive to light.  Right pupil: Shape: regular. Reactivity: brisk.   Left pupil: Shape: regular. Reactivity: brisk.   Nystagmus: none     CN V   Facial sensation intact.     CN VII   Facial expression full, symmetric.     CN VIII    CN VIII normal.     CN IX, X   Palate: symmetric    CN XI   CN XI normal.     CN XII   CN XII normal.     MOTOR EXAM   Muscle bulk: normal  Overall muscle tone: normal  Right arm pronator drift: absent  Left arm pronator drift: absent       Moving all extremities well and ambulating to restroom unassisted      SENSORY EXAM   Light touch normal.     GAIT AND COORDINATION     Tremor   Resting tremor: absent    Significant Labs:   Hemoglobin A1c: No results for input(s): HGBA1C in the last 720 hours.  Blood Culture:   Recent Labs  Lab 08/30/17 0055   LABBLOO No Growth to date  No Growth to date  No Growth to date  No Growth to date     CBC:   Recent Labs  Lab 08/30/17 0055 08/31/17  0451   WBC 1.68* 2.26*   HGB 11.7* 10.8*   HCT 33.7* 31.0*    157     CMP:   Recent Labs  Lab 08/30/17 0055 08/31/17  0450   * 199*    141   K 4.2 3.2*    105   CO2 26 26   BUN 12 8   CREATININE 1.1 0.8   CALCIUM 9.6 8.7   MG 1.5* 1.1*   PROT 7.1 6.1   ALBUMIN 3.2* 2.9*   BILITOT 0.7 0.7   ALKPHOS 82 76   AST 17 15   ALT 20 14   ANIONGAP 12 10   EGFRNONAA >60 >60.0     Inflammatory Markers:   Recent Labs  Lab 08/30/17  1208   PROCAL 0.10     Urine Culture: No results for input(s): LABURIN in the last 48 hours.  Urine Studies:   Recent Labs  Lab 08/30/17  0202   COLORU Yellow   APPEARANCEUA Clear   PHUR 6.0   SPECGRAV >=1.030*   PROTEINUA 2+*   GLUCUA 2+*   KETONESU Negative   BILIRUBINUA Negative   OCCULTUA 1+*   NITRITE Negative   UROBILINOGEN Negative   LEUKOCYTESUR Negative   RBCUA 2   WBCUA 3   BACTERIA Occasional   HYALINECASTS 0     All pertinent lab results from the past 24 hours have been reviewed.    Significant Imaging: I have reviewed and interpreted all pertinent imaging results/findings within the past 24 hours.

## 2017-08-31 NOTE — NURSING TRANSFER
Nursing Transfer Note      8/31/2017     Transfer from 801 to 1148    Transfer via wheelchair    Transfer with personal belongings and meds. Wife at the bedside    Transported by RN     Medicines sent: insulin     Chart send with patient: yes    Notified: Jacky ORTIZ

## 2017-09-01 ENCOUNTER — PATIENT OUTREACH (OUTPATIENT)
Dept: ADMINISTRATIVE | Facility: CLINIC | Age: 57
End: 2017-09-01

## 2017-09-01 NOTE — PATIENT INSTRUCTIONS
Fall Prevention  Falls often occur due to slipping, tripping or losing your balance. Millions of people fall every year and injure themselves. Here are ways to reduce your risk of falling again.  · Think about your fall, was there anything that caused your fall that can be fixed, removed, or replaced?  · Make your home safe by keeping walkways clear of objects you may trip over.  · Use non-slip pads under rugs. Do not use area rugs or small throw rugs.  · Use non-slip mats in bathtubs and showers.  · Install handrails and lights on staircases.  · Do not walk in poorly lit areas.  · Do not stand on chairs or wobbly ladders.  · Use caution when reaching overhead or looking upward. This position can cause a loss of balance.  · Be sure your shoes fit properly, have non-slip bottoms and are in good condition.   · Wear shoes both inside and out. Avoid going barefoot or wearing slippers.  · Be cautious when going up and down stairs, curbs, and when walking on uneven sidewalks.  · If your balance is poor, consider using a cane or walker.  · If your fall was related to alcohol use, stop or limit alcohol intake.   · If your fall was related to use of sleeping medicines, talk to your doctor about this. You may need to reduce your dosage at bedtime if you awaken during the night to go to the bathroom.    · To reduce the need for nighttime bathroom trips:  ¨ Avoid drinking fluids for several hours before going to bed  ¨ Empty your bladder before going to bed  ¨ Men can keep a urinal at the bedside  · Stay as active as you can. Balance, flexibility, strength, and endurance all come from exercise. They all play a role in preventing falls. Ask your healthcare provider which types of activity are right for you.  · Get your vision checked on a regular basis.  · If you have pets, know where they are before you stand up or walk so you don't trip over them.  · Use night lights.  Date Last Reviewed: 11/5/2015  © 2827-7996 [employer]. [address] [place] [address], [place] [address]. All rights reserved. This information is not intended as a substitute for professional medical care. Always follow your healthcare professional's instructions.

## 2017-09-01 NOTE — DISCHARGE SUMMARY
"[facility] Hospital Medicine  Discharge Summary      Patient Name: Bessy Nunez  MRN: [id number]  Admission Date: 8/30/2017  Hospital Length of Stay: 1 days  Discharge Date and Time:  08/31/2017 11:34 PM  Attending Physician: No att. providers found   Discharging Provider: Louie Tafoya MD  Primary Care Provider: Edgar Nova MD    [facility] Medicine Team: [facility] HOSP MED 2     HPI: 57 y.o. Male with hx of HTN, HLD, DMII, depression and CNS vasculitis presented to ED 8/30/17 from home for somnolence, confusion, weakness, unsteady gait and recent leukopenia seen on lab work after cytoxan infusion (6/3/17) & (8/14/17). Wife called and spoke to MS team 8/28 complaining of "fogginess" over the last two days, inability to hold decent conversation, weakness and falls. Wife descirbes finding him in the bathroom but is unsure of whether he hit his head. Wife also reports urinary incontinence and 1 episode of bowel incontinence last night (though these have been ongoing issues since May when he was first diagnosed) Patient denies convulsions, numbness/tingling, weakness. Wife reports patient has not been eating or drinking well at home and sleeps 18 continuous hours one day. At [place] ED, he was given IVFs and wife thinks his mental status improved, but still not back to cognitive baseline. Since arrival to [facility], he has been afebrile, with leukopenia (WBC 1.68) improved from outpatient labs, elevated lactate (3.1) and CXR showed opacity in left lower lobe concerning for atelectasis vs. pneumonia. Patient denies cough, recent illness or sick contacts. Wife did not take temperature at home, but was concerned for fever because he was shivering. UA unremarkable for UTI and blood Cx and procalcitonin in process. Wife says she has not given prednisone at home since ~2 weeks prior to most recent cytoxan infusion in August and per wife was told to wait to resume until after final dose of cytoxan. He has been going " to [facility] 2x/wk which wife feels has helped somewhat with balance.     * No surgery found *      Indwelling Lines/Drains at time of discharge:   Lines/Drains/Airways          No matching active lines, drains, or airways        Hospital Course:   8/30 - admitted for AMS. Neurology consulted and feel dehydration is a likely contributing factor. Infection workup in process. BC pending. UA clean. Procalcitonin wnl. CT head showed no acute process.  8/31 - BC NGTD. UA clean. CXR showed mild patch that could be PNA but patient remains afebrile and non toxic. EEG was to be completed inpatient but due to priority EEGs for NCC, patient was unable to obtain today. After discussing with neurology it was felt that the EEG could be completed o/p and f/u with Dr. Love for cytoxin treatment in September. Given patient did not show signs of active infection and his neutropenic status, it was felt his mental status was not so severely off baseline to warrant continued risk of exposing him to a nosocomial infection. Patient is being dicharged w/ instructions to follow up for EEG outpatient and continuation of Keppra could be determined at that time.     Consults:   Consults         Status Ordering Provider     Inpatient consult to Neurology  Once     Provider:  (Not yet assigned)    Completed JORJE TORRES        Vitals:    08/31/17 1500   BP: 131/67   Pulse: 72   Resp: 18   Temp: 98 °F (36.7 °C)     Review of Systems   Constitutional: Positive for activity change, appetite change and fatigue. Negative for diaphoresis and fever.   HENT: Negative for tinnitus, trouble swallowing and voice change.    Eyes: Negative for photophobia, pain and visual disturbance.   Respiratory: Negative for chest tightness and shortness of breath.    Cardiovascular: Negative for chest pain.   Gastrointestinal: Negative for abdominal pain, constipation, diarrhea, nausea and vomiting.   Genitourinary: Negative for dysuria.   Musculoskeletal: Positive  for gait problem. Negative for neck pain and neck stiffness.   Skin: Negative for color change and rash.   Allergic/Immunologic: Positive for immunocompromised state.   Neurological: Positive for weakness. Negative for dizziness, tremors, seizures, syncope, facial asymmetry, speech difficulty, light-headedness, numbness and headaches.   Psychiatric/Behavioral: Positive for confusion and depressed mood.    Physical Exam   Constitutional: He is oriented to person, place, and time. He appears well-nourished. No distress.   HENT:   Head: Normocephalic and atraumatic.   Eyes: Conjunctivae are normal. Pupils are equal, round, and reactive to light.   Neck: Normal range of motion. Neck supple.   Pulmonary/Chest: Effort normal. No wheezes or crackles.  Musculoskeletal: Normal range of motion.   Neurological: He is oriented to person, place, and time.   Reflex Scores:       Bicep reflexes are 1+ on the right side and 1+ on the left side.       Brachioradialis reflexes are 1+ on the right side and 1+ on the left side.       Patellar reflexes are 1+ on the right side and 1+ on the left side.  Skin: He is not diaphoretic.   Psychiatric: His speech is normal.     Significant Diagnostic Studies:     BC: NGTD     CXR: Mild amount of opacity in the left lower lung zone which may represent atelectasis or pneumonia.    CT Head  No evidence of acute infarction, hemorrhage, mass, or hydrocephalus.     Scattered areas of decreased attenuation throughout the supratentorial white matter which is nonspecific but likely represents chronic microvascular ischemic changes, not significantly changed when compared to prior exam.    Pending Diagnostic Studies:     None        Final Active Diagnoses:    Diagnosis Date Noted POA    PRINCIPAL PROBLEM:  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] 06/02/2017 Yes    Encephalopathy acute [G93.40] 08/30/2017 Yes    Chemotherapy-induced neutropenia [D70.1] 08/30/2017 Yes    Hypomagnesemia [E83.42] 06/04/2017 Yes     Urinary incontinence [R32] 06/04/2017 Yes    Obesity [E66.9] 10/11/2013 Yes     Chronic    Type 2 diabetes mellitus with hyperglycemia, with long-term current use of insulin [E11.65, Z79.4] 10/11/2013 Not Applicable    Depressive disorder, not elsewhere classified [F32.9] 11/09/2012 Yes     Chronic    Mixed hyperlipidemia [E78.2] 11/09/2012 Yes     Chronic    Essential hypertension [I10] 11/09/2012 Yes     Chronic      Problems Resolved During this Admission:    Diagnosis Date Noted Date Resolved POA      Discharged Condition: fair    Disposition: Home or Self Care    Follow Up:  Follow-up Information     Edgar Nova MD.    Specialty:  Internal Medicine  Contact information:  [address] [address] [phone]             [facility].    Specialty:  Neurology  Why:  If you have not heard from them in two weeks time then reach out and call.   Contact information:  [address]  [facility] [address] [phone]  Additional information:  7th Floor - [facility]               Patient Instructions:     Ambulatory consult to Neurology   Referral Priority: Routine Referral Type: Consultation   Referral Reason: Specialty Services Required    Requested Specialty: Neurology    Number of Visits Requested: 1      Diet general     Call MD for:  temperature >100.4     Call MD for:  persistent nausea and vomiting or diarrhea     Call MD for:  severe uncontrolled pain     Call MD for:  redness, tenderness, or signs of infection (pain, swelling, redness, odor or green/yellow discharge around incision site)     Call MD for:  difficulty breathing or increased cough     Call MD for:  severe persistent headache     Call MD for:  persistent dizziness, light-headedness, or visual disturbances     Call MD for:  increased confusion or weakness       Medications:  Reconciled Home Medications:   Discharge Medication List as of 8/31/2017  5:50 PM      CONTINUE these medications which have  "NOT CHANGED    Details   aspirin (ECOTRIN) 81 MG EC tablet Take 81 mg by mouth once daily., Until Discontinued, Historical Med      atenolol (TENORMIN) 50 MG tablet Take 1 tablet (50 mg total) by mouth once daily., Starting 3/22/2017, Until Discontinued, Normal      atorvastatin (LIPITOR) 40 MG tablet Take 1 tablet (40 mg total) by mouth once daily., Starting 3/22/2017, Until Discontinued, Normal      diltiaZEM (DILACOR XR) 240 MG CDCR Take 1 capsule (240 mg total) by mouth once daily., Starting 3/22/2017, Until Discontinued, Normal      insulin aspart (NOVOLOG) 100 unit/mL InPn pen [measured glc - 100]/50 = # units sliding scale, Normal      insulin degludec (TRESIBA FLEXTOUCH U-200) 200 unit/mL (3 mL) InPn Inject 15 Units into the skin every evening., Starting Wed 6/7/2017, No Print      insulin needles, disposable, (BD ULTRA-FINE ANA PEN NEEDLES) 32 x 5/32 " Ndle Inject twice daily, Normal      levetiracetam (KEPPRA) 500 MG Tab Take 1 tablet (500 mg total) by mouth 2 (two) times daily., Starting Sun 6/4/2017, Until Mon 6/4/2018, Normal      liraglutide 0.6 mg/0.1 mL, 18 mg/3 mL, subq PNIJ (VICTOZA 3-TOMASZ) 0.6 mg/0.1 mL (18 mg/3 mL) PnIj Inject 1.8 mg into the skin once daily., Starting 3/24/2017, Until Sat 3/24/18, Normal      metformin (GLUCOPHAGE-XR) 500 MG 24 hr tablet Take 2 tablets (1,000 mg total) by mouth 2 (two) times daily with meals., Starting 3/2/2017, Until Fri 3/2/18, Normal      omega-3 fatty acids-vitamin E (FISH OIL) 1,000 mg Cap Take 1 capsule by mouth 2 (two) times daily., Starting 3/2/2017, Until Fri 3/2/18, OTC      predniSONE (DELTASONE) 20 MG tablet Take 1 tablet (20 mg total) by mouth once daily., Starting Tue 9/12/2017, Until Thu 10/12/2017, Normal      ranitidine (ZANTAC) 150 MG tablet Take 1 tablet (150 mg total) by mouth 2 (two) times daily., Starting Sun 6/4/2017, Until Mon 6/4/2018, Normal      sertraline (ZOLOFT) 100 MG tablet 1 and half tablet daily, Normal      valsartan (DIOVAN) " 320 MG tablet Take 1 tablet (320 mg total) by mouth once daily., Starting 3/22/2017, Until Thu 3/22/18, No Print      vitamin D 1000 units Tab Take 5 tablets (5,000 Units total) by mouth once daily., Starting 5/18/2017, Until Discontinued, OTC           Time spent on the discharge of patient: 30 minutes         Louie Tafoya MD  Department of Hospital Medicine  [facility]

## 2017-09-01 NOTE — PT/OT/SLP DISCHARGE
Physical Therapy Discharge Summary    Bessy Nunez  MRN: [id number]   CNS vasculitis   Patient Discharged from acute Physical Therapy on 17.  Please refer to prior PT noted date on 17 for functional status.     Assessment:   Patient was discharge unexpectedly.  Information required to complete and accurate discharge summary is unknown.  Refer to therapy initial evaluation and last progress note for initial and most recent functional status and goal achievement.  Recommendations made may be found in medical record.  GOALS:    Physical Therapy Goals        Problem: Physical Therapy Goal    Goal Priority Disciplines Outcome Goal Variances Interventions   Physical Therapy Goal     PT/OT, PT Ongoing (interventions implemented as appropriate)     Description:  Goals to be met by: 9/10/17     Patient will increase functional independence with mobility by performin. Supine to sit with Modified [place]  2. Sit to supine with Modified [place]  3. Sit to stand transfer with Modified [place]  4. Bed to chair transfer with Supervision   5. Gait  x 200 feet with Supervision using LRAD  6. Stand for 5 minutes with SBA during dynamic activities.                     Reasons for Discontinuation of Therapy Services  Transfer to alternate level of care.      Plan:  Patient Discharged to: OPPT.    Dayana Fine DPT, PT  2017

## 2017-09-01 NOTE — PLAN OF CARE
Future Appointments  Date Time Provider Department Center   9/11/2017 10:20 AM Shana Love MD [facility] [address]   9/11/2017 11:20 AM LAB, APPOINTMENT [place] [facility]   9/12/2017 11:00 AM [facility] [place]   9/29/2017 1:30 PM Jez Espinoza MD [facility] [place]        09/01/17 0752   Final Note   Assessment Type Final Discharge Note   Discharge Disposition Home   What phone number can be called within the next 1-3 days to see how you are doing after discharge? [phone]   Hospital Follow Up  Appt(s) scheduled? Yes   Right Care Referral Info   Post Acute Recommendation No Care

## 2017-09-04 LAB
BACTERIA BLD CULT: NORMAL
BACTERIA BLD CULT: NORMAL

## 2017-09-05 ENCOUNTER — TELEPHONE (OUTPATIENT)
Dept: NEUROLOGY | Facility: CLINIC | Age: 57
End: 2017-09-05

## 2017-09-05 DIAGNOSIS — I77.6 CNS VASCULITIS: ICD-10-CM

## 2017-09-05 DIAGNOSIS — R41.0 EPISODIC CONFUSION: Primary | ICD-10-CM

## 2017-09-05 NOTE — TELEPHONE ENCOUNTER
Call returned to Bekah. She states that when pt was admitted recently, Dr Love recommended he have another EEG done. She would like to know status of this. She also states pt falls frequently and sleeps nearly 20 hours a day.     Informed her that I will check with Dr Love about this order. If she does order it, someone with that dept will call her to get pt scheduled.

## 2017-09-05 NOTE — TELEPHONE ENCOUNTER
----- Message from Daniel Selby sent at 9/1/2017  4:35 PM CDT -----  Contact: Bekah  ( spouse ) [phone]  Caller is returning a missed call from lexie Vizcaino return call

## 2017-09-06 NOTE — TELEPHONE ENCOUNTER
ANTHONY left for [employer] tech at [facility] to call this office back (Evon Vanessa [phone]).

## 2017-09-07 NOTE — PT/OT/SLP DISCHARGE
Occupational Therapy Discharge Summary    Bessy Nunez  MRN: [id number]   <principal problem not specified>   Patient Discharged from acute Occupational Therapy on 8/31/17.  Please refer to prior OT note dated on 8/31/17 for functional status.     Assessment:   Patient was discharge unexpectedly.  Information required to complete and accurate discharge summary is unknown.  Refer to therapy initial evaluation and last progress note for initial and most recent functional status and goal achievement.  Recommendations made may be found in medical record.  GOALS:    Occupational Therapy Goals     Not on file              Reasons for Discontinuation of Therapy Services  Transfer to alternate level of care.      Plan:  Patient Discharged to: Home with OPPT .

## 2017-09-07 NOTE — TELEPHONE ENCOUNTER
----- Message from Mahnaz Crespo sent at 9/7/2017 11:10 AM CDT -----  Contact: Bekah (wife) @ [phone]  Returning Charis's call.

## 2017-09-07 NOTE — TELEPHONE ENCOUNTER
Call received from Evon at [facility]. She states they are unable to do EEGs at this time. They transmit to [facility], and that facility still does not have power. She suspects it will be up and running by end of next week or shortly thereafter.    ANTHONY left for Bekah to call this office back.

## 2017-09-08 ENCOUNTER — TELEPHONE (OUTPATIENT)
Dept: HEMATOLOGY/ONCOLOGY | Facility: CLINIC | Age: 57
End: 2017-09-08

## 2017-09-11 ENCOUNTER — LAB VISIT (OUTPATIENT)
Dept: LAB | Facility: HOSPITAL | Age: 57
End: 2017-09-11
Payer: MEDICAID

## 2017-09-11 ENCOUNTER — OFFICE VISIT (OUTPATIENT)
Dept: NEUROLOGY | Facility: CLINIC | Age: 57
End: 2017-09-11
Payer: MEDICAID

## 2017-09-11 VITALS — SYSTOLIC BLOOD PRESSURE: 134 MMHG | DIASTOLIC BLOOD PRESSURE: 81 MMHG | BODY MASS INDEX: 30.28 KG/M2 | WEIGHT: 211 LBS

## 2017-09-11 DIAGNOSIS — R26.9 GAIT DISTURBANCE: ICD-10-CM

## 2017-09-11 DIAGNOSIS — Z71.89 COUNSELING REGARDING GOALS OF CARE: ICD-10-CM

## 2017-09-11 DIAGNOSIS — I77.6 CNS VASCULITIS: ICD-10-CM

## 2017-09-11 DIAGNOSIS — Z79.899 ENCOUNTER FOR LONG-TERM (CURRENT) USE OF HIGH-RISK MEDICATION: ICD-10-CM

## 2017-09-11 DIAGNOSIS — I77.6 CNS VASCULITIS: Primary | ICD-10-CM

## 2017-09-11 DIAGNOSIS — Z79.899 HIGH RISK MEDICATION USE: ICD-10-CM

## 2017-09-11 LAB
BASOPHILS # BLD AUTO: 0.04 K/UL
BASOPHILS NFR BLD: 0.4 %
DIFFERENTIAL METHOD: ABNORMAL
EOSINOPHIL # BLD AUTO: 0 K/UL
EOSINOPHIL NFR BLD: 0.3 %
ERYTHROCYTE [DISTWIDTH] IN BLOOD BY AUTOMATED COUNT: 14.9 %
HCT VFR BLD AUTO: 33.5 %
HGB BLD-MCNC: 12 G/DL
LYMPHOCYTES # BLD AUTO: 1.7 K/UL
LYMPHOCYTES NFR BLD: 17.2 %
MCH RBC QN AUTO: 28 PG
MCHC RBC AUTO-ENTMCNC: 35.8 G/DL
MCV RBC AUTO: 78 FL
MONOCYTES # BLD AUTO: 0.6 K/UL
MONOCYTES NFR BLD: 5.6 %
NEUTROPHILS # BLD AUTO: 7.5 K/UL
NEUTROPHILS NFR BLD: 75.8 %
PLATELET # BLD AUTO: 204 K/UL
PMV BLD AUTO: 9.2 FL
RBC # BLD AUTO: 4.29 M/UL
TSH SERPL DL<=0.005 MIU/L-ACNC: 1.8 UIU/ML
VIT B12 SERPL-MCNC: 574 PG/ML
WBC # BLD AUTO: 9.85 K/UL

## 2017-09-11 PROCEDURE — 84207 ASSAY OF VITAMIN B-6: CPT

## 2017-09-11 PROCEDURE — 3008F BODY MASS INDEX DOCD: CPT | Mod: ,,, | Performed by: PSYCHIATRY & NEUROLOGY

## 2017-09-11 PROCEDURE — 99215 OFFICE O/P EST HI 40 MIN: CPT | Mod: S$PBB,,, | Performed by: PSYCHIATRY & NEUROLOGY

## 2017-09-11 PROCEDURE — 3079F DIAST BP 80-89 MM HG: CPT | Mod: ,,, | Performed by: PSYCHIATRY & NEUROLOGY

## 2017-09-11 PROCEDURE — 99999 PR PBB SHADOW E&M-EST. PATIENT-LVL III: CPT | Mod: PBBFAC,,, | Performed by: PSYCHIATRY & NEUROLOGY

## 2017-09-11 PROCEDURE — 82607 VITAMIN B-12: CPT

## 2017-09-11 PROCEDURE — 99213 OFFICE O/P EST LOW 20 MIN: CPT | Mod: PBBFAC | Performed by: PSYCHIATRY & NEUROLOGY

## 2017-09-11 PROCEDURE — 84425 ASSAY OF VITAMIN B-1: CPT

## 2017-09-11 PROCEDURE — 84443 ASSAY THYROID STIM HORMONE: CPT

## 2017-09-11 PROCEDURE — 80053 COMPREHEN METABOLIC PANEL: CPT

## 2017-09-11 PROCEDURE — 3075F SYST BP GE 130 - 139MM HG: CPT | Mod: ,,, | Performed by: PSYCHIATRY & NEUROLOGY

## 2017-09-11 PROCEDURE — 85025 COMPLETE CBC W/AUTO DIFF WBC: CPT

## 2017-09-11 NOTE — Clinical Note
Lobito, I'm seeing Mr. Nunez now.  He's pretty confused right now, and so I'm going to work this up with EEG/ MRI/Labs.  Would like to put tomorrow's cytoxan on hold until we have better sense of what's going on.  Will let you know if/when we'd like to restart this.  Thanks for all,  Shana

## 2017-09-11 NOTE — Clinical Note
CHRIS Thomas this is the patient that I referred to you to assume management of Cytoxan; last week I saw him and was concerned about worsening cognition; I advised him to hold off on more Cytoxan until MRI was done--this MRI showed ongoing evidene of vasculitis with some ischemic change as well; he was admitted, and long story short, was dosed again with Cytoxan when in house yesterday.  We lowered dose to 600mg/m2 given significan lymphopenia after previous dose.  Just wanted to give you heads up b/c I see he has no f/u with you on books.  Will likely need Cytoxan again in one month; thanks for all;

## 2017-09-11 NOTE — PROGRESS NOTES
Subjective:       Patient ID: Bessy Nunez is a 57 y.o. male who presents today for a routine clinic visit for CNS vasculitis; he is accompanied by his wife    · His wife reports that in the week after the last Cytoxan, he improved significantly; after that he became more confused; b/c of betty lymphopenia, he was admitted a few weeks ago; confusion has not improved;      SOCIAL HISTORY  Social History   Substance Use Topics    Smoking status: Never Smoker    Smokeless tobacco: Never Used    Alcohol use No     Living arrangements - the patient lives with their spouse        Objective:      Neurologic Exam      Today, pt is oriented to person, but not time or place;   Can follow one step commands, but not multi-step  Cannot spell WORLD backwards;   Wants to lie down on the stretcher;   Able to walk with walker      Labs:     Lab Results   Component Value Date    [id number] 11 (L) 05/17/2017       Diagnosis   1. CNS Vasculitis  2. Cognitive dysfunction  3. Recent lymphopenia on Cytoxan      Discussion   Next Cytoxan set for tomorrow with Dr. Goldstein, but given his cognitive decline I'd like to hold on this and get expedited MRI/EEG.    Lightweight walker   Labs today   Will likely continue Cytoxan, but will wait to dose it until w/u is done          Over 50% of this 40 minute visit was spent in direct face to face counseling of the patient and his wife about his CNS vasculitis and rationale for expedited w/u and delay of Cytoxan       CNS vasculitis  -     MRI Brain W WO Contrast; Future; Expected date: 09/11/2017  -     VITAMIN B6; Future; Expected date: 09/11/2017  -     VITAMIN B12; Future; Expected date: 09/11/2017  -     VITAMIN B1; Future; Expected date: 09/11/2017  -     TSH; Future; Expected date: 09/11/2017  -     Comprehensive metabolic panel; Future  -     WALKER FOR HOME USE  -     Ambulatory Consult to Home Health    Gait disturbance  -     WALKER FOR HOME USE  -     Ambulatory Consult to Home  Health

## 2017-09-11 NOTE — LETTER
September 14, 2017      Jess Sprague MD  [address]  [facility] [address]           [facility]  [address]  [facility] [address]  Phone: [phone]          Patient: Bessy Nunez   MR Number: [id number]   YOB: 1960   Date of Visit: 9/11/2017       Dear Dr. Jess Sprague:    Thank you for referring Bessy Nunez to me for evaluation. Attached you will find relevant portions of my assessment and plan of care.    If you have questions, please do not hesitate to call me. I look forward to following Bessy Nunez along with you.    Sincerely,        Enclosure  CC:  No Recipients    If you would like to receive this communication electronically, please contact [email or web address] or [phone] to request more information on [email or web address] Link access.    For providers and/or their staff who would like to refer a patient to [facility], please contact us through our one-stop-shop provider referral line, [facility], at [phone].    If you feel you have received this communication in error or would no longer like to receive these types of communications, please e-mail [email or web address]

## 2017-09-12 ENCOUNTER — TELEPHONE (OUTPATIENT)
Dept: NEUROLOGY | Facility: CLINIC | Age: 57
End: 2017-09-12

## 2017-09-12 LAB
ALBUMIN SERPL BCP-MCNC: 3.6 G/DL
ALP SERPL-CCNC: 86 U/L
ALT SERPL W/O P-5'-P-CCNC: 23 U/L
ANION GAP SERPL CALC-SCNC: 13 MMOL/L
AST SERPL-CCNC: 24 U/L
BILIRUB SERPL-MCNC: 0.9 MG/DL
BUN SERPL-MCNC: 17 MG/DL
CALCIUM SERPL-MCNC: 9.1 MG/DL
CHLORIDE SERPL-SCNC: 98 MMOL/L
CO2 SERPL-SCNC: 28 MMOL/L
CREAT SERPL-MCNC: 1.3 MG/DL
EST. GFR  (AFRICAN AMERICAN): >60 ML/MIN/1.73 M^2
EST. GFR  (NON AFRICAN AMERICAN): 59.7 ML/MIN/1.73 M^2
GLUCOSE SERPL-MCNC: 565 MG/DL
POTASSIUM SERPL-SCNC: 3.1 MMOL/L
PROT SERPL-MCNC: 7.1 G/DL
SODIUM SERPL-SCNC: 139 MMOL/L

## 2017-09-12 NOTE — TELEPHONE ENCOUNTER
Call again placed to Bekah. She states that his blood sugar this morning was 200. Informed of results from yesterday. No further recs at this time.

## 2017-09-12 NOTE — TELEPHONE ENCOUNTER
Call received from Joseph at lab. Pt's glucose yesterday is 565. Dr Love aware.     VM left for Bekah to call this office back.

## 2017-09-13 ENCOUNTER — NURSE TRIAGE (OUTPATIENT)
Dept: ADMINISTRATIVE | Facility: CLINIC | Age: 57
End: 2017-09-13

## 2017-09-13 ENCOUNTER — HOSPITAL ENCOUNTER (OUTPATIENT)
Dept: PULMONOLOGY | Facility: HOSPITAL | Age: 57
Discharge: HOME | End: 2017-09-13
Attending: PSYCHIATRY & NEUROLOGY
Payer: MEDICAID

## 2017-09-13 DIAGNOSIS — I77.6 CNS VASCULITIS: ICD-10-CM

## 2017-09-13 DIAGNOSIS — R41.0 EPISODIC CONFUSION: ICD-10-CM

## 2017-09-13 PROCEDURE — 95819 EEG AWAKE AND ASLEEP: CPT

## 2017-09-13 PROCEDURE — 95816 EEG AWAKE AND DROWSY: CPT | Mod: 26,,, | Performed by: PSYCHIATRY & NEUROLOGY

## 2017-09-14 ENCOUNTER — HOSPITAL ENCOUNTER (INPATIENT)
Facility: HOSPITAL | Age: 57
LOS: 5 days | Discharge: HOME | DRG: 064 | End: 2017-09-19
Attending: EMERGENCY MEDICINE | Admitting: PSYCHIATRY & NEUROLOGY
Payer: MEDICAID

## 2017-09-14 ENCOUNTER — TELEPHONE (OUTPATIENT)
Dept: FAMILY MEDICINE | Facility: CLINIC | Age: 57
End: 2017-09-14

## 2017-09-14 ENCOUNTER — HOSPITAL ENCOUNTER (OUTPATIENT)
Dept: RADIOLOGY | Facility: HOSPITAL | Age: 57
Discharge: HOME | End: 2017-09-14
Attending: PSYCHIATRY & NEUROLOGY
Payer: MEDICAID

## 2017-09-14 ENCOUNTER — TELEPHONE (OUTPATIENT)
Dept: HEMATOLOGY/ONCOLOGY | Facility: CLINIC | Age: 57
End: 2017-09-14

## 2017-09-14 ENCOUNTER — HOSPITAL ENCOUNTER (EMERGENCY)
Facility: HOSPITAL | Age: 57
Discharge: ANOTHER HOSPITAL | End: 2017-09-14
Attending: EMERGENCY MEDICINE
Payer: MEDICAID

## 2017-09-14 VITALS
BODY MASS INDEX: 32.86 KG/M2 | TEMPERATURE: 97 F | SYSTOLIC BLOOD PRESSURE: 154 MMHG | RESPIRATION RATE: 17 BRPM | HEART RATE: 78 BPM | DIASTOLIC BLOOD PRESSURE: 93 MMHG | WEIGHT: 229 LBS | OXYGEN SATURATION: 99 %

## 2017-09-14 DIAGNOSIS — I63.9 CEREBROVASCULAR ACCIDENT (CVA), UNSPECIFIED MECHANISM: Primary | ICD-10-CM

## 2017-09-14 DIAGNOSIS — T38.0X5A ADVERSE EFFECT OF GLUCOCORTICOIDS AND SYNTHETIC ANALOGUES, INITIAL ENCOUNTER: ICD-10-CM

## 2017-09-14 DIAGNOSIS — E11.65 TYPE 2 DIABETES MELLITUS WITH HYPERGLYCEMIA, WITH LONG-TERM CURRENT USE OF INSULIN: Primary | ICD-10-CM

## 2017-09-14 DIAGNOSIS — E66.9 OBESITY (BMI 30-39.9): Chronic | ICD-10-CM

## 2017-09-14 DIAGNOSIS — I77.6 CNS VASCULITIS: Primary | ICD-10-CM

## 2017-09-14 DIAGNOSIS — Z79.4 TYPE 2 DIABETES MELLITUS WITH HYPERGLYCEMIA, WITH LONG-TERM CURRENT USE OF INSULIN: ICD-10-CM

## 2017-09-14 DIAGNOSIS — I77.6 CNS VASCULITIS: ICD-10-CM

## 2017-09-14 DIAGNOSIS — I10 ESSENTIAL HYPERTENSION: Chronic | ICD-10-CM

## 2017-09-14 DIAGNOSIS — E11.42 TYPE 2 DIABETES MELLITUS WITH DIABETIC POLYNEUROPATHY, WITH LONG-TERM CURRENT USE OF INSULIN: ICD-10-CM

## 2017-09-14 DIAGNOSIS — I63.9 EMBOLIC STROKE OF LEFT BASAL GANGLIA: ICD-10-CM

## 2017-09-14 DIAGNOSIS — Z79.4 TYPE 2 DIABETES MELLITUS WITH DIABETIC POLYNEUROPATHY, WITH LONG-TERM CURRENT USE OF INSULIN: ICD-10-CM

## 2017-09-14 DIAGNOSIS — Z79.4 TYPE 2 DIABETES MELLITUS WITH HYPERGLYCEMIA, WITH LONG-TERM CURRENT USE OF INSULIN: Primary | ICD-10-CM

## 2017-09-14 DIAGNOSIS — Z74.09 IMPAIRED FUNCTIONAL MOBILITY, BALANCE, GAIT, AND ENDURANCE: ICD-10-CM

## 2017-09-14 DIAGNOSIS — E11.65 TYPE 2 DIABETES MELLITUS WITH HYPERGLYCEMIA, WITH LONG-TERM CURRENT USE OF INSULIN: ICD-10-CM

## 2017-09-14 DIAGNOSIS — E66.09 NON MORBID OBESITY DUE TO EXCESS CALORIES: Chronic | ICD-10-CM

## 2017-09-14 DIAGNOSIS — I63.81 LACUNAR STROKE OF LEFT SUBTHALAMIC REGION: ICD-10-CM

## 2017-09-14 DIAGNOSIS — R27.0 ATAXIA: ICD-10-CM

## 2017-09-14 DIAGNOSIS — K76.0 NAFLD (NONALCOHOLIC FATTY LIVER DISEASE): Chronic | ICD-10-CM

## 2017-09-14 DIAGNOSIS — G47.33 OSA (OBSTRUCTIVE SLEEP APNEA): Chronic | ICD-10-CM

## 2017-09-14 DIAGNOSIS — E87.6 HYPOKALEMIA: ICD-10-CM

## 2017-09-14 PROBLEM — G93.40 ENCEPHALOPATHY ACUTE: Status: RESOLVED | Noted: 2017-08-30 | Resolved: 2017-09-14

## 2017-09-14 PROBLEM — G35 MULTIPLE SCLEROSIS: Status: RESOLVED | Noted: 2017-06-02 | Resolved: 2017-09-14

## 2017-09-14 PROBLEM — I63.12 EMBOLIC STROKE INVOLVING BASILAR ARTERY: Status: ACTIVE | Noted: 2017-09-14

## 2017-09-14 PROBLEM — G93.40 ENCEPHALOPATHY: Status: RESOLVED | Noted: 2017-05-26 | Resolved: 2017-09-14

## 2017-09-14 PROBLEM — G37.9 DEMYELINATING CHANGES IN BRAIN: Status: RESOLVED | Noted: 2017-05-13 | Resolved: 2017-09-14

## 2017-09-14 LAB
ALBUMIN SERPL BCP-MCNC: 3.7 G/DL
ALP SERPL-CCNC: 82 U/L
ALT SERPL W/O P-5'-P-CCNC: 21 U/L
ANION GAP SERPL CALC-SCNC: 14 MMOL/L
AST SERPL-CCNC: 16 U/L
BASOPHILS # BLD AUTO: 0.03 K/UL
BASOPHILS NFR BLD: 0.3 %
BILIRUB SERPL-MCNC: 0.6 MG/DL
BUN SERPL-MCNC: 19 MG/DL
CALCIUM SERPL-MCNC: 9.7 MG/DL
CHLORIDE SERPL-SCNC: 100 MMOL/L
CO2 SERPL-SCNC: 25 MMOL/L
CREAT SERPL-MCNC: 1 MG/DL
DIFFERENTIAL METHOD: ABNORMAL
EOSINOPHIL # BLD AUTO: 0 K/UL
EOSINOPHIL NFR BLD: 0.3 %
ERYTHROCYTE [DISTWIDTH] IN BLOOD BY AUTOMATED COUNT: 15.5 %
EST. GFR  (AFRICAN AMERICAN): >60 ML/MIN/1.73 M^2
EST. GFR  (NON AFRICAN AMERICAN): >60 ML/MIN/1.73 M^2
GLUCOSE SERPL-MCNC: 338 MG/DL
HCT VFR BLD AUTO: 34.3 %
HGB BLD-MCNC: 12.1 G/DL
INR PPP: 1
LYMPHOCYTES # BLD AUTO: 2.1 K/UL
LYMPHOCYTES NFR BLD: 21.6 %
MCH RBC QN AUTO: 27.9 PG
MCHC RBC AUTO-ENTMCNC: 35.3 G/DL
MCV RBC AUTO: 79 FL
MONOCYTES # BLD AUTO: 0.9 K/UL
MONOCYTES NFR BLD: 8.8 %
NEUTROPHILS # BLD AUTO: 6.9 K/UL
NEUTROPHILS NFR BLD: 69 %
PLATELET # BLD AUTO: 239 K/UL
PMV BLD AUTO: 9.7 FL
POTASSIUM SERPL-SCNC: 2.9 MMOL/L
PROT SERPL-MCNC: 7.3 G/DL
PROTHROMBIN TIME: 10.2 SEC
PYRIDOXAL SERPL-MCNC: 8 UG/L (ref 5–50)
RBC # BLD AUTO: 4.33 M/UL
SODIUM SERPL-SCNC: 139 MMOL/L
VIT B1 SERPL-MCNC: 86 UG/L (ref 38–122)
WBC # BLD AUTO: 9.93 K/UL

## 2017-09-14 PROCEDURE — 80053 COMPREHEN METABOLIC PANEL: CPT

## 2017-09-14 PROCEDURE — 27000494 *HC OXYGEN SET UP (STAH ONLY)

## 2017-09-14 PROCEDURE — 99285 EMERGENCY DEPT VISIT HI MDM: CPT | Mod: 25

## 2017-09-14 PROCEDURE — 63600175 PHARM REV CODE 636 W HCPCS: Performed by: EMERGENCY MEDICINE

## 2017-09-14 PROCEDURE — 99285 EMERGENCY DEPT VISIT HI MDM: CPT | Mod: ,,, | Performed by: EMERGENCY MEDICINE

## 2017-09-14 PROCEDURE — 96372 THER/PROPH/DIAG INJ SC/IM: CPT

## 2017-09-14 PROCEDURE — 99285 EMERGENCY DEPT VISIT HI MDM: CPT | Mod: 25,27

## 2017-09-14 PROCEDURE — 82962 GLUCOSE BLOOD TEST: CPT

## 2017-09-14 PROCEDURE — 36415 COLL VENOUS BLD VENIPUNCTURE: CPT

## 2017-09-14 PROCEDURE — 25500020 PHARM REV CODE 255: Performed by: PSYCHIATRY & NEUROLOGY

## 2017-09-14 PROCEDURE — A9585 GADOBUTROL INJECTION: HCPCS | Performed by: PSYCHIATRY & NEUROLOGY

## 2017-09-14 PROCEDURE — 99223 1ST HOSP IP/OBS HIGH 75: CPT | Mod: ,,, | Performed by: NURSE PRACTITIONER

## 2017-09-14 PROCEDURE — 12000002 HC ACUTE/MED SURGE SEMI-PRIVATE ROOM

## 2017-09-14 PROCEDURE — 96374 THER/PROPH/DIAG INJ IV PUSH: CPT

## 2017-09-14 PROCEDURE — 70553 MRI BRAIN STEM W/O & W/DYE: CPT | Mod: 26,,, | Performed by: RADIOLOGY

## 2017-09-14 PROCEDURE — 85610 PROTHROMBIN TIME: CPT

## 2017-09-14 PROCEDURE — 85025 COMPLETE CBC W/AUTO DIFF WBC: CPT

## 2017-09-14 PROCEDURE — 70553 MRI BRAIN STEM W/O & W/DYE: CPT | Mod: TC

## 2017-09-14 RX ORDER — PANTOPRAZOLE SODIUM 40 MG/1
40 TABLET, DELAYED RELEASE ORAL DAILY
Status: DISCONTINUED | OUTPATIENT
Start: 2017-09-15 | End: 2017-09-19 | Stop reason: HOSPADM

## 2017-09-14 RX ORDER — ATENOLOL 50 MG/1
50 TABLET ORAL DAILY
Status: DISCONTINUED | OUTPATIENT
Start: 2017-09-15 | End: 2017-09-19 | Stop reason: HOSPADM

## 2017-09-14 RX ORDER — METFORMIN HYDROCHLORIDE 500 MG/1
1000 TABLET, EXTENDED RELEASE ORAL 2 TIMES DAILY WITH MEALS
Qty: 360 TABLET | Refills: 0 | Status: CANCELLED | OUTPATIENT
Start: 2017-09-14 | End: 2018-09-14

## 2017-09-14 RX ORDER — GADOBUTROL 604.72 MG/ML
9 INJECTION INTRAVENOUS
Status: COMPLETED | OUTPATIENT
Start: 2017-09-14 | End: 2017-09-14

## 2017-09-14 RX ORDER — LABETALOL HYDROCHLORIDE 5 MG/ML
10 INJECTION, SOLUTION INTRAVENOUS EVERY 6 HOURS PRN
Status: DISCONTINUED | OUTPATIENT
Start: 2017-09-15 | End: 2017-09-19 | Stop reason: HOSPADM

## 2017-09-14 RX ORDER — HEPARIN SODIUM 5000 [USP'U]/ML
5000 INJECTION, SOLUTION INTRAVENOUS; SUBCUTANEOUS EVERY 8 HOURS
Status: DISCONTINUED | OUTPATIENT
Start: 2017-09-15 | End: 2017-09-19 | Stop reason: HOSPADM

## 2017-09-14 RX ORDER — ATORVASTATIN CALCIUM 20 MG/1
40 TABLET, FILM COATED ORAL DAILY
Status: DISCONTINUED | OUTPATIENT
Start: 2017-09-15 | End: 2017-09-19 | Stop reason: HOSPADM

## 2017-09-14 RX ORDER — CHOLECALCIFEROL (VITAMIN D3) 25 MCG
5000 TABLET ORAL DAILY
Status: DISCONTINUED | OUTPATIENT
Start: 2017-09-15 | End: 2017-09-19 | Stop reason: HOSPADM

## 2017-09-14 RX ORDER — VALSARTAN 320 MG/1
320 TABLET ORAL DAILY
Qty: 90 TABLET | Refills: 3 | Status: CANCELLED
Start: 2017-09-14 | End: 2018-09-14

## 2017-09-14 RX ORDER — PEN NEEDLE, DIABETIC 30 GX3/16"
NEEDLE, DISPOSABLE MISCELLANEOUS
Qty: 100 EACH | Refills: 3 | Status: CANCELLED | OUTPATIENT
Start: 2017-09-14

## 2017-09-14 RX ORDER — DILTIAZEM HYDROCHLORIDE 240 MG/1
240 CAPSULE, COATED, EXTENDED RELEASE ORAL DAILY
Status: DISCONTINUED | OUTPATIENT
Start: 2017-09-15 | End: 2017-09-19 | Stop reason: HOSPADM

## 2017-09-14 RX ORDER — ONDANSETRON 8 MG/1
8 TABLET, ORALLY DISINTEGRATING ORAL EVERY 8 HOURS PRN
Status: DISCONTINUED | OUTPATIENT
Start: 2017-09-15 | End: 2017-09-19 | Stop reason: HOSPADM

## 2017-09-14 RX ORDER — VALSARTAN 160 MG/1
320 TABLET ORAL DAILY
Status: DISCONTINUED | OUTPATIENT
Start: 2017-09-15 | End: 2017-09-19 | Stop reason: HOSPADM

## 2017-09-14 RX ORDER — LEVETIRACETAM 500 MG/1
500 TABLET ORAL 2 TIMES DAILY
Status: DISCONTINUED | OUTPATIENT
Start: 2017-09-15 | End: 2017-09-19 | Stop reason: HOSPADM

## 2017-09-14 RX ORDER — SODIUM CHLORIDE 0.9 % (FLUSH) 0.9 %
3 SYRINGE (ML) INJECTION EVERY 8 HOURS
Status: DISCONTINUED | OUTPATIENT
Start: 2017-09-15 | End: 2017-09-19 | Stop reason: HOSPADM

## 2017-09-14 RX ORDER — ONDANSETRON 2 MG/ML
4 INJECTION INTRAMUSCULAR; INTRAVENOUS EVERY 12 HOURS PRN
Status: DISCONTINUED | OUTPATIENT
Start: 2017-09-15 | End: 2017-09-19 | Stop reason: HOSPADM

## 2017-09-14 RX ORDER — ASPIRIN 81 MG/1
81 TABLET ORAL DAILY
Status: DISCONTINUED | OUTPATIENT
Start: 2017-09-15 | End: 2017-09-19 | Stop reason: HOSPADM

## 2017-09-14 RX ORDER — POTASSIUM CHLORIDE 7.45 MG/ML
10 INJECTION INTRAVENOUS
Status: DISCONTINUED | OUTPATIENT
Start: 2017-09-14 | End: 2017-09-14 | Stop reason: HOSPADM

## 2017-09-14 RX ADMIN — POTASSIUM CHLORIDE 10 MEQ: 10 INJECTION, SOLUTION INTRAVENOUS at 08:09

## 2017-09-14 RX ADMIN — GADOBUTROL 9 ML: 604.72 INJECTION INTRAVENOUS at 12:09

## 2017-09-14 NOTE — TELEPHONE ENCOUNTER
Agree with recommendations taht pt should be seen today to rule out anything acute like infection.  Looks like he's going to get MRI today.  Please call pt/wife - recommend he be seen today, ER would be fine.

## 2017-09-14 NOTE — TELEPHONE ENCOUNTER
MRI done today shows very small but acute stroke.   Strongly agree with recommendation to go to ER ASAP.  M/S, please give his wife a call.

## 2017-09-14 NOTE — ED TRIAGE NOTES
Pt had an MRI of brain done today, wife reports was called by pt's neurologist to go to ER due to MRI showing a stroke, pt with no complaints or deficits at this time

## 2017-09-14 NOTE — TELEPHONE ENCOUNTER
"    Reason for Disposition   Difficult to awaken or acting confused  (e.g., disoriented, slurred speech)    Protocols used:  NEUROLOGIC [facility]-    Bekah called to say Bessy, her , has been acting very unusual today.  She states he has been fatigued since he began chemo for CNS vasculitis, but today she has not been able to wake him up except with great effort.  When she gets him up, he will say he is awake, but he falls back asleep immediately.  She also states that he is confused, disoriented, and "in a total fog."  He is being evaluated by Dr Love for gait disturbance, and Bekah states she found him on the floor after a fall yesterday.  Of note, he is scheduled for MRI of the brain tomorrow, per Bekah, but she states his breathing is very deep and heavy, he is extremely somnolent,  and she is concerned about waiting until tomorrow for him to be seen. Recommended that she call 911 for him to receive immediate medical attention.  She said she will do so.  Message to Edgar Nova MD ,pcp, and Dr Love.  Please contact caller directly with any additional care advice.    "

## 2017-09-14 NOTE — TELEPHONE ENCOUNTER
"----- Message from Kelley Michaels sent at 9/13/2017  4:38 PM CDT -----  Contact: Bekah (wife )[phone]  Pt is needing a sooner appointment for refills and follow up from hospital visit pt was offered 11/3 but declined because it was too far out pt needs refills on metformin 500mg ,insulin needles, disposable, (BD ULTRA-FINE ANA PEN NEEDLES) 32 x 5/32 " Ndle,valsartan (DIOVAN) 320 MG tablet  "

## 2017-09-14 NOTE — TELEPHONE ENCOUNTER
I spoke with Bekah and strongly advised, per Dr. Love, that she bring Bessy to the ER ASAP as the MRI done today shows very small but acute stroke. Bekah verbalized understanding and will bring patient to the ER now.

## 2017-09-14 NOTE — PROGRESS NOTES
EEG REPORT      Patients Name: Bessy Nunez  Date of Recordin17  Technician: Evon  Patient :[birth year]    Referring Physician: Dr. Love  Reason for Exam: Seizure  Notable medications: None significant      INTRODUCTION:  This EEG was  recorded using 10-channels and the International 10/20 electrode placement system.  The patient was sleep deprived.    FINDINGS:  This EEG was recording during wakefulness and drowsiness only.  A 9Hz posterior dominant rhythm was persistently observed and of normal amplitude and symmetry.   Mild muscle artifact, Eye Blinks, Beta frequency artifact occurred throughout the recording  Focal slowing was not observed.   Epileptiform activity was not observed    Hyperventilation: Was not Performed    Photic Stimulation: Was not Performed    Clinical Impression:  Normal awake and  Drowsy EEG.      Marcio Manzanares MD    CC: Dr Love

## 2017-09-14 NOTE — ED PROVIDER NOTES
[facility]                                                  Chief Complaint  57 y.o. male with Abnormal MRI    History of Present Illness  Bessy Nunez presents to the emergency room after his neurologist, Dr. Love, called him with concerning results from his MRI today after he presented to the ER.  His wife reports that she feels that he has been increasingly confused over past weeks, however he does appear to be getting better today.  Patient himself verbalizes no concerns or complaints.  He denies weakness, dizziness, difficulty moving, or any neurologic deficit.  He does endorse generalized fatigue which he attributes to chemotherapy for multiple sclerosis.      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    Mixed hyperlipidemia 11/9/2012    NAFLD (nonalcoholic fatty liver disease) 10/11/2013    CHASE (nonalcoholic steatohepatitis)     Obesity     Stroke     Type 2 diabetes mellitus with diabetic polyneuropathy, with long-term current use of insulin 5/14/2017     Past Surgical History:   Procedure Laterality Date    SKIN CANCER EXCISION        Review of patient's allergies indicates:  No Known Allergies     Review of Systems and Physical Exam     Review of Systems  -- Constitution - no fever, reports fatigue, no weakness, no chills  -- Eyes - no tearing or redness, no visual disturbance  -- Ear, Nose - no tinnitus or earache, no nasal congestion or discharge  -- Mouth,Throat - no sore throat, no toothache, normal voice, normal swallowing  -- Respiratory - denies cough and congestion, no shortness of breath, no wheezing  -- Cardiovascular - denies chest pain, no palpitations, denies claudication  -- Gastrointestinal - denies abdominal pain, nausea, vomiting, or diarrhea  -- Genitourinary - no dysuria, no denies flank pain, no hematuria or frequency   -- Musculoskeletal - denies back pain, negative  for myalgias and arthralgias   -- Neurological - no headache, denies weakness or seizure; no LOC  -- Skin - denies pallor, rash, or changes in skin. no hives or welts noted    Vital Signs   weight is 103.9 kg (229 lb). His temperature is 98.4 °F (36.9 °C). His blood pressure is 163/94 (abnormal) and his pulse is 87. His respiration is 18.      Physical Exam  -- Nursing note and vitals reviewed  -- Constitutional: Appears well-developed and well-nourished  -- Head: Atraumatic. Normocephalic. No obvious abnormality  -- Eyes: Pupils are equal and reactive to light. Normal conjunctiva and lids  -- Nose: Nose normal in appearance, nares grossly normal. No discharge  -- Throat: Mucous membranes moist, pharynx normal, normal tonsils. No lesions   -- Ears: External ears and TM normal bilaterally. Normal hearing and no drainage  -- Neck: Normal range of motion. Neck supple. No masses, trachea midline  -- Cardiac: Normal rate, regular rhythm and normal heart sounds  -- Pulmonary: Normal respiratory effort, breath sounds clear to auscultation  -- Abdominal: Soft, no tenderness. Normal bowel sounds. Normal liver edge  -- Musculoskeletal: Normal range of motion, no effusions. Joints stable   -- Neurological: No focal deficits.  Cranial nerves II through XII grossly intact.  Patient ambulates without issue.  Patient's strength in all 4 extremities 5 out of 5  -- Vascular: Posterior tibial, dorsalis pedis and radial pulses 2+ bilaterally    -- Lymphatics: No cervical or peripheral lymphadenopathy. No edema noted  -- Skin: Warm and dry. No evidence of rash or cellulitis  -- Psychiatric: Normal mood and affect. Bedside behavior is appropriate    Emergency Room Course     Treatment and Evaluation  1.  Physical exam unremarkable  2.  Dr. Love contacted through the transfer center.  Requests evaluation by vascular neurology  3.   except patient for transfer for subacute stroke  4.  Requests CBC/CMP/coag  5.  Transferred  [facility]        Diagnosis  -- Subacute stroke    Disposition and Plan  -- Disposition: Transfer to [facility]  -- Condition: stable  -       Rahel Valdez MD  09/14/17 2003

## 2017-09-15 PROBLEM — T38.0X5A ADVERSE EFFECT OF GLUCOCORTICOIDS AND SYNTHETIC ANALOGUES, INITIAL ENCOUNTER: Status: ACTIVE | Noted: 2017-09-15

## 2017-09-15 PROBLEM — D62 ACUTE BLOOD LOSS ANEMIA: Status: RESOLVED | Noted: 2017-05-28 | Resolved: 2017-09-15

## 2017-09-15 LAB
ALBUMIN SERPL BCP-MCNC: 3.1 G/DL
ALP SERPL-CCNC: 70 U/L
ALT SERPL W/O P-5'-P-CCNC: 19 U/L
ANION GAP SERPL CALC-SCNC: 11 MMOL/L
APTT BLDCRRT: 22.2 SEC
AST SERPL-CCNC: 23 U/L
BACTERIA #/AREA URNS AUTO: NORMAL /HPF
BASOPHILS # BLD AUTO: 0.03 K/UL
BASOPHILS NFR BLD: 0.4 %
BILIRUB SERPL-MCNC: 0.8 MG/DL
BILIRUB UR QL STRIP: NEGATIVE
BUN SERPL-MCNC: 13 MG/DL
CALCIUM SERPL-MCNC: 8.4 MG/DL
CHLORIDE SERPL-SCNC: 101 MMOL/L
CHOLEST SERPL-MCNC: 190 MG/DL
CHOLEST/HDLC SERPL: 3.7 {RATIO}
CK MB SERPL-MCNC: 0.8 NG/ML
CK MB SERPL-RTO: 2 %
CK SERPL-CCNC: 40 U/L
CLARITY UR REFRACT.AUTO: CLEAR
CO2 SERPL-SCNC: 26 MMOL/L
COLOR UR AUTO: YELLOW
CREAT SERPL-MCNC: 0.8 MG/DL
DIFFERENTIAL METHOD: ABNORMAL
EOSINOPHIL # BLD AUTO: 0.1 K/UL
EOSINOPHIL NFR BLD: 0.7 %
ERYTHROCYTE [DISTWIDTH] IN BLOOD BY AUTOMATED COUNT: 15.5 %
EST. GFR  (AFRICAN AMERICAN): >60 ML/MIN/1.73 M^2
EST. GFR  (NON AFRICAN AMERICAN): >60 ML/MIN/1.73 M^2
ESTIMATED AVG GLUCOSE: 263 MG/DL
GLUCOSE SERPL-MCNC: 253 MG/DL
GLUCOSE UR QL STRIP: ABNORMAL
HBA1C MFR BLD HPLC: 10.8 %
HCT VFR BLD AUTO: 31.8 %
HDLC SERPL-MCNC: 51 MG/DL
HDLC SERPL: 26.8 %
HGB BLD-MCNC: 11.5 G/DL
HGB UR QL STRIP: ABNORMAL
HYALINE CASTS UR QL AUTO: 0 /LPF
INR PPP: 1
KETONES UR QL STRIP: NEGATIVE
LDLC SERPL CALC-MCNC: 69.2 MG/DL
LEUKOCYTE ESTERASE UR QL STRIP: NEGATIVE
LYMPHOCYTES # BLD AUTO: 1.6 K/UL
LYMPHOCYTES NFR BLD: 22.8 %
MAGNESIUM SERPL-MCNC: 1.4 MG/DL
MCH RBC QN AUTO: 27.7 PG
MCHC RBC AUTO-ENTMCNC: 36.2 G/DL
MCV RBC AUTO: 77 FL
MICROSCOPIC COMMENT: NORMAL
MONOCYTES # BLD AUTO: 0.6 K/UL
MONOCYTES NFR BLD: 7.8 %
NEUTROPHILS # BLD AUTO: 4.8 K/UL
NEUTROPHILS NFR BLD: 67.9 %
NITRITE UR QL STRIP: NEGATIVE
NONHDLC SERPL-MCNC: 139 MG/DL
PH UR STRIP: 6 [PH] (ref 5–8)
PHOSPHATE SERPL-MCNC: 3.1 MG/DL
PLATELET # BLD AUTO: 176 K/UL
PMV BLD AUTO: 9.2 FL
POCT GLUCOSE: 267 MG/DL (ref 70–110)
POCT GLUCOSE: 269 MG/DL (ref 70–110)
POCT GLUCOSE: 299 MG/DL (ref 70–110)
POCT GLUCOSE: 338 MG/DL (ref 70–110)
POCT GLUCOSE: 411 MG/DL (ref 70–110)
POCT GLUCOSE: 483 MG/DL (ref 70–110)
POTASSIUM SERPL-SCNC: 2.8 MMOL/L
PROT SERPL-MCNC: 6.2 G/DL
PROT UR QL STRIP: ABNORMAL
PROTHROMBIN TIME: 10.8 SEC
RBC # BLD AUTO: 4.15 M/UL
RBC #/AREA URNS AUTO: 3 /HPF (ref 0–4)
SODIUM SERPL-SCNC: 138 MMOL/L
SP GR UR STRIP: 1.01 (ref 1–1.03)
SQUAMOUS #/AREA URNS AUTO: 0 /HPF
TRIGL SERPL-MCNC: 349 MG/DL
TROPONIN I SERPL DL<=0.01 NG/ML-MCNC: <0.006 NG/ML
URN SPEC COLLECT METH UR: ABNORMAL
UROBILINOGEN UR STRIP-ACNC: NEGATIVE EU/DL
WBC # BLD AUTO: 7.06 K/UL
WBC #/AREA URNS AUTO: 0 /HPF (ref 0–5)
YEAST UR QL AUTO: NORMAL

## 2017-09-15 PROCEDURE — 99900035 HC TECH TIME PER 15 MIN (STAT)

## 2017-09-15 PROCEDURE — 80061 LIPID PANEL: CPT

## 2017-09-15 PROCEDURE — 97165 OT EVAL LOW COMPLEX 30 MIN: CPT

## 2017-09-15 PROCEDURE — 63600175 PHARM REV CODE 636 W HCPCS: Performed by: NURSE PRACTITIONER

## 2017-09-15 PROCEDURE — 81001 URINALYSIS AUTO W/SCOPE: CPT

## 2017-09-15 PROCEDURE — 85610 PROTHROMBIN TIME: CPT

## 2017-09-15 PROCEDURE — 92610 EVALUATE SWALLOWING FUNCTION: CPT

## 2017-09-15 PROCEDURE — G8997 SWALLOW GOAL STATUS: HCPCS | Mod: CH

## 2017-09-15 PROCEDURE — 84484 ASSAY OF TROPONIN QUANT: CPT

## 2017-09-15 PROCEDURE — 82553 CREATINE MB FRACTION: CPT

## 2017-09-15 PROCEDURE — 63600175 PHARM REV CODE 636 W HCPCS: Performed by: PHYSICIAN ASSISTANT

## 2017-09-15 PROCEDURE — 94660 CPAP INITIATION&MGMT: CPT

## 2017-09-15 PROCEDURE — 20600001 HC STEP DOWN PRIVATE ROOM

## 2017-09-15 PROCEDURE — 25000003 PHARM REV CODE 250: Performed by: NURSE PRACTITIONER

## 2017-09-15 PROCEDURE — 97532 *HC OT COG SKL DEV EA 15: CPT

## 2017-09-15 PROCEDURE — 85025 COMPLETE CBC W/AUTO DIFF WBC: CPT

## 2017-09-15 PROCEDURE — G8987 SELF CARE CURRENT STATUS: HCPCS | Mod: CJ

## 2017-09-15 PROCEDURE — 83036 HEMOGLOBIN GLYCOSYLATED A1C: CPT

## 2017-09-15 PROCEDURE — 99223 1ST HOSP IP/OBS HIGH 75: CPT | Mod: ,,, | Performed by: INTERNAL MEDICINE

## 2017-09-15 PROCEDURE — G8988 SELF CARE GOAL STATUS: HCPCS | Mod: CI

## 2017-09-15 PROCEDURE — 99233 SBSQ HOSP IP/OBS HIGH 50: CPT | Mod: ,,, | Performed by: NURSE PRACTITIONER

## 2017-09-15 PROCEDURE — 92523 SPEECH SOUND LANG COMPREHEN: CPT

## 2017-09-15 PROCEDURE — A4216 STERILE WATER/SALINE, 10 ML: HCPCS | Performed by: NURSE PRACTITIONER

## 2017-09-15 PROCEDURE — 27000221 HC OXYGEN, UP TO 24 HOURS

## 2017-09-15 PROCEDURE — 25000003 PHARM REV CODE 250: Performed by: PHYSICIAN ASSISTANT

## 2017-09-15 PROCEDURE — 97161 PT EVAL LOW COMPLEX 20 MIN: CPT

## 2017-09-15 PROCEDURE — 84100 ASSAY OF PHOSPHORUS: CPT

## 2017-09-15 PROCEDURE — 36415 COLL VENOUS BLD VENIPUNCTURE: CPT

## 2017-09-15 PROCEDURE — 80053 COMPREHEN METABOLIC PANEL: CPT

## 2017-09-15 PROCEDURE — 99233 SBSQ HOSP IP/OBS HIGH 50: CPT | Mod: ,,, | Performed by: PSYCHIATRY & NEUROLOGY

## 2017-09-15 PROCEDURE — 85730 THROMBOPLASTIN TIME PARTIAL: CPT

## 2017-09-15 PROCEDURE — G8996 SWALLOW CURRENT STATUS: HCPCS | Mod: CI

## 2017-09-15 PROCEDURE — 83735 ASSAY OF MAGNESIUM: CPT

## 2017-09-15 PROCEDURE — 97530 THERAPEUTIC ACTIVITIES: CPT

## 2017-09-15 PROCEDURE — 27000190 HC CPAP FULL FACE MASK W/VALVE

## 2017-09-15 RX ORDER — INSULIN ASPART 100 [IU]/ML
10 INJECTION, SOLUTION INTRAVENOUS; SUBCUTANEOUS
Status: DISCONTINUED | OUTPATIENT
Start: 2017-09-15 | End: 2017-09-16

## 2017-09-15 RX ORDER — IBUPROFEN 200 MG
24 TABLET ORAL
Status: DISCONTINUED | OUTPATIENT
Start: 2017-09-15 | End: 2017-09-16

## 2017-09-15 RX ORDER — MAGNESIUM SULFATE HEPTAHYDRATE 40 MG/ML
2 INJECTION, SOLUTION INTRAVENOUS ONCE
Status: COMPLETED | OUTPATIENT
Start: 2017-09-15 | End: 2017-09-15

## 2017-09-15 RX ORDER — INSULIN ASPART 100 [IU]/ML
8 INJECTION, SOLUTION INTRAVENOUS; SUBCUTANEOUS
Status: DISCONTINUED | OUTPATIENT
Start: 2017-09-15 | End: 2017-09-15

## 2017-09-15 RX ORDER — GLUCAGON 1 MG
1 KIT INJECTION
Status: DISCONTINUED | OUTPATIENT
Start: 2017-09-15 | End: 2017-09-16

## 2017-09-15 RX ORDER — INSULIN ASPART 100 [IU]/ML
5 INJECTION, SOLUTION INTRAVENOUS; SUBCUTANEOUS
Status: DISCONTINUED | OUTPATIENT
Start: 2017-09-15 | End: 2017-09-15

## 2017-09-15 RX ORDER — INSULIN ASPART 100 [IU]/ML
1-10 INJECTION, SOLUTION INTRAVENOUS; SUBCUTANEOUS
Status: DISCONTINUED | OUTPATIENT
Start: 2017-09-15 | End: 2017-09-16

## 2017-09-15 RX ORDER — POTASSIUM CHLORIDE 20 MEQ/1
40 TABLET, EXTENDED RELEASE ORAL ONCE
Status: COMPLETED | OUTPATIENT
Start: 2017-09-15 | End: 2017-09-15

## 2017-09-15 RX ORDER — IBUPROFEN 200 MG
16 TABLET ORAL
Status: DISCONTINUED | OUTPATIENT
Start: 2017-09-15 | End: 2017-09-16

## 2017-09-15 RX ADMIN — INSULIN ASPART 5 UNITS: 100 INJECTION, SOLUTION INTRAVENOUS; SUBCUTANEOUS at 11:09

## 2017-09-15 RX ADMIN — PANTOPRAZOLE SODIUM 40 MG: 40 TABLET, DELAYED RELEASE ORAL at 08:09

## 2017-09-15 RX ADMIN — ASPIRIN 81 MG: 81 TABLET, COATED ORAL at 08:09

## 2017-09-15 RX ADMIN — INSULIN DETEMIR 30 UNITS: 100 INJECTION, SOLUTION SUBCUTANEOUS at 05:09

## 2017-09-15 RX ADMIN — INSULIN ASPART 6 UNITS: 100 INJECTION, SOLUTION INTRAVENOUS; SUBCUTANEOUS at 07:09

## 2017-09-15 RX ADMIN — Medication 1 CAPSULE: at 09:09

## 2017-09-15 RX ADMIN — Medication 3 ML: at 05:09

## 2017-09-15 RX ADMIN — INSULIN ASPART 5 UNITS: 100 INJECTION, SOLUTION INTRAVENOUS; SUBCUTANEOUS at 07:09

## 2017-09-15 RX ADMIN — INSULIN ASPART 10 UNITS: 100 INJECTION, SOLUTION INTRAVENOUS; SUBCUTANEOUS at 10:09

## 2017-09-15 RX ADMIN — POTASSIUM CHLORIDE 40 MEQ: 1500 TABLET, EXTENDED RELEASE ORAL at 11:09

## 2017-09-15 RX ADMIN — Medication 1 CAPSULE: at 08:09

## 2017-09-15 RX ADMIN — INSULIN ASPART 10 UNITS: 100 INJECTION, SOLUTION INTRAVENOUS; SUBCUTANEOUS at 05:09

## 2017-09-15 RX ADMIN — ATENOLOL 50 MG: 50 TABLET ORAL at 08:09

## 2017-09-15 RX ADMIN — ATORVASTATIN CALCIUM 40 MG: 20 TABLET, FILM COATED ORAL at 08:09

## 2017-09-15 RX ADMIN — Medication 1 CAPSULE: at 12:09

## 2017-09-15 RX ADMIN — LEVETIRACETAM 500 MG: 500 TABLET ORAL at 08:09

## 2017-09-15 RX ADMIN — DILTIAZEM HYDROCHLORIDE 240 MG: 240 CAPSULE, EXTENDED RELEASE ORAL at 08:09

## 2017-09-15 RX ADMIN — VALSARTAN 320 MG: 160 TABLET, FILM COATED ORAL at 08:09

## 2017-09-15 RX ADMIN — SERTRALINE HYDROCHLORIDE 150 MG: 100 TABLET ORAL at 08:09

## 2017-09-15 RX ADMIN — INSULIN DETEMIR 8 UNITS: 100 INJECTION, SOLUTION SUBCUTANEOUS at 12:09

## 2017-09-15 RX ADMIN — METHYLPREDNISOLONE SODIUM SUCCINATE: 1 INJECTION, POWDER, LYOPHILIZED, FOR SOLUTION INTRAMUSCULAR; INTRAVENOUS at 03:09

## 2017-09-15 RX ADMIN — MAGNESIUM SULFATE IN WATER 2 G: 40 INJECTION, SOLUTION INTRAVENOUS at 11:09

## 2017-09-15 RX ADMIN — HEPARIN SODIUM 5000 UNITS: 5000 INJECTION, SOLUTION INTRAVENOUS; SUBCUTANEOUS at 05:09

## 2017-09-15 RX ADMIN — Medication 3 ML: at 09:09

## 2017-09-15 RX ADMIN — HEPARIN SODIUM 5000 UNITS: 5000 INJECTION, SOLUTION INTRAVENOUS; SUBCUTANEOUS at 09:09

## 2017-09-15 RX ADMIN — INSULIN ASPART 8 UNITS: 100 INJECTION, SOLUTION INTRAVENOUS; SUBCUTANEOUS at 11:09

## 2017-09-15 RX ADMIN — INSULIN ASPART 6 UNITS: 100 INJECTION, SOLUTION INTRAVENOUS; SUBCUTANEOUS at 05:09

## 2017-09-15 RX ADMIN — VITAMIN D, TAB 1000IU (100/BT) 5000 UNITS: 25 TAB at 08:09

## 2017-09-15 NOTE — PLAN OF CARE
Problem: SLP Goal  Goal: SLP Goal  Speech Language Pathology Goals  Goals expected to be met by 9/22  1. Patient will tolerate regular consistency/thin liquids po diet with no overt s/s of aspiration.   2. Pt will complete categorization tasks with 80% acc provided min cues to improve cognitive-linguistic skill.   3.Pt will complete sentences with 80% acc provided min cues to improve expressive language.  4. Pt will solve mod level problems with 80% acc provided min cues to improve cognition.   5. Pt will complete high level word finding tasks with 80% acc provided min cues to improve expressive language.         Swallow and speech language cognitive evaluation completed with initiated POC.    aMrva Madrid M.A. CCC-SLP  Speech Language Pathologist  [phone]  9/15/2017

## 2017-09-15 NOTE — PT/OT/SLP EVAL
Speech Language Pathology Evaluation    Bessy Nunez   MRN: [id number]   Admitting Diagnosis: Lacunar stroke of left subthalamic region    Diet recommendations: Solid Diet Level: Regular  Liquid Diet Level: Thin    No straws, Small bites/sips and Meds whole 1 at a time    SLP Treatment Date: 09/15/17  Speech Start Time: 1052     Speech Stop Time: 1108     Speech Total (min): 16 min       TREATMENT BILLABLE MINUTES:  Eval 8  and Eval Swallow and Oral Function 8    Diagnosis: Lacunar stroke of left subthalamic region      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 : Yes  Keep patient NPO?: No   General Precautions: Standard,            Social Hx: Patient lives with spouse    Prior diet: Per SLP note 6/1: regular diet and thin liquid    Occupational/hobbies/homemaking: Pt working full time as a  for [employer]; works with computers. Pt completed 1 year of college.    Subjective:  Pt awake pleasant  Sitting in chair at BS  Patient goals: to go back to work    Pain/Comfort  Pain Rating 1: 0/10  Pain Rating Post-Intervention 1: 0/10    Objective:        Oral Musculature Evaluation  Oral Musculature: WFL  Dentition: present and adequate  Mucosal Quality: good  Mandibular Strength and Mobility: WNL  Oral Labial Strength and Mobility: WNL  Lingual Strength and Mobility: WNL  Buccal Strength and Mobility: WNL  Volitional Cough: strong  Volitional Swallow: able to demonstrate  Voice Prior to PO Intake: clear     Cognitive  Status:  Behavioral Observations: alert and appropriate-  Orientation, sequencing, immediate recall, complex reasonin% acc indep   Attention: WNL to assessment stimuli  mental manipulation,  complex problem solvin% acc indep  Delayed recall: 0% acc indep   convergent categorization: 66% acc indep    Language:   Receptive Language:  LR discrimination, complex YN questions, follow 1-3 step commands, paragraph comprehension, identified objects FC3: 100% acc indep     Expressive Language:  Basic conversation present with hesitations and few semantic paraphasias  rote greetings, naming objects: 100% acc indep   sentence completion: 66% acc indep    Motor Speech: mild apraxia    Voice: No evidence of impairment    Reading: Read aloud and identified 2 sentences: 100% acc indep     Writing: wrote name to dictation using dominant hand: 100% acc indep; mild impairment to handwriting that may have been 2/2 positioning     Visual-Spatial: Copied 3D object: approx 100% acc indep    Bedside Swallow Eval:  Consistencies Assessed: Thin liquids 6 straw sips, 6 cup sips Puree 3 full spoonfuls and Solids 1/4 randolph cracker  Oral Phase: WFL  Pharyngeal Phase: immediate cough x2 with thin liquid via straw following solid trial only; otherwise, no overt clinical  signs/symptoms of aspiration    Skilled education provided on aspiration precautions, diet recommendations, and communication strategies. Whiteboard updated with diet recommendations/aspiration precautions.  No further questions. Stroke team notified of diet recs.       FIM:  Social Interaction: 6 Complete [place]--The patient interacts appropriately with staff, other patients, and family members (e.g., controls temper, accepts criticism, is aware that words and actions have an impact on others), and does not require medication for   Problem Solvin Supervision--The patient requires supervision (e.g., cueing or coaxing) to solve less routine problems only  under stressful or unfamiliar conditions, but no more than 10% of the time.   Comprehension: 6 Modified [place]--In most situations, the patient understands readily or with only mild dificulty complex or abstract directions and conversation.  The patient does not require prompting, though (s)he may require a hearing or visual aid, other x   Expression: 5 Standby Prompting--The patient expresses basic daily needs and ideas more than 90% of the time.  Requires prompting (e.g., frequent repetition) less than 10% of the time to be understood.   Memory: 5 Supervision-The patient requires prompting (e.g., cueing, repetition, reminders) only under stressful or unfamiliar conditions, but no more than 10% of the time.     Assessment:  Bessy Nunez is a 57 y.o. male with a SLP diagnosis of mild pharyngeal dysphagia, Aphasia, Apraxia and Cognitive-Linguistic Impairment. .    Do you have any cultural, spiritual, Roman Catholic conflicts, given your current situation?: no    Discharge recommendations: Discharge Facility/Level Of Care Needs: home health speech therapy     Goals:    SLP Goals        Problem: SLP Goal    Goal Priority Disciplines Outcome   SLP Goal     SLP    Description:  Speech Language Pathology Goals  Goals expected to be met by 9/22  1. Patient will tolerate regular consistency/thin liquids po diet with no overt s/s of aspiration.   2. Pt will complete categorization tasks with 80% acc provided min cues to improve cognitive-linguistic skill.   3.Pt will complete sentences with 80% acc provided min cues to improve expressive language.  4. Pt will solve mod level problems with 80% acc provided min cues to improve cognition.   5. Pt will complete high level word finding tasks with 80% acc provided min cues to improve expressive language.                          Plan:   Patient to be seen Therapy Frequency: 5 x/week   Plan of Care expires: 10/14/17  Plan of Care reviewed with: patient, spouse  SLP Follow-up?:  Yes  SLP - Next Visit Date: 09/18/17          Marva Madrid M.A. CCC-SLP  Speech Language Pathologist  [phone]  9/15/2017

## 2017-09-15 NOTE — TELEPHONE ENCOUNTER
----- Message from Maxine López sent at 9/15/2017  9:05 AM CDT -----  Contact: spouse- Bekah  Patient's spouse is r/s appointment because pt is still in the hospital. Asking to r/s appointment but no available appointment until November.   [phone]

## 2017-09-15 NOTE — PLAN OF CARE
Chief Complaint: Diabetes Mellitus      HPI:  Suhas Castillo is 47 y.o. male here for the first time for diabetes management.    Diabetes was first diagnosed in   At the time of diagnosis, patient reported symptoms of nocturia, polydipsia, unexplained weight loss     Currently denies nocturia, polyuria, unexplained weight loss or blurred vision.     Current diabetes medications include:   Metformin 1000 mg po bid   Januvia 100 mg po qd     Other medications tried: none     Hypoglycemia: denies hypoglcyemic episodes or symptoms   Reports hospital admission related to diabetes at initial diagnosis     Diabetes complications include: retinopathy, neuropathy and chronic kidney disease.    Last diabetic eye exam: , next exam prior to  Gras 2017     Last podiatry exam: never     Deniespersonal history of numbness, tingling sensations to bilateral lower extremities     denies symptomatic CAD or CVD     24 hour dietary recall:   Breakfast: sausage irvin on wheat toast, diet cranberry juice   Lunch: black eyed peas and okra, water   Dinner: grilled chicken salad, water   Snacks: 1 slice of wheat bread with turkey breast , water     SMBG: tests BG once daily   Fastin-160 mg/dL , reports blood sugar was 160 after eating ice cream the night before     Diabetes education: No  Exercise: treadmill x 30 mins weekly, weights weekly     ADA STANDARDS OF CARE:   ACE inhibitor or angiotension II receptor blocker: benazepril   Statin drug: simvastatin   Low dose ASA: denies   Dental exam: , next appt scheduled 2017   Flu shot: denies   Pneumonia vaccine: denies     Wears medic alert tag: No    Has Glucagon ER kit: No      Review of Systems   Constitutional: Negative for fatigue and unexpected weight change.   HENT: Negative for sore throat, trouble swallowing and voice change.    Eyes: Negative for visual disturbance.   Respiratory: Negative for cough and shortness of breath.    Cardiovascular: Negative for  Problem: Patient Care Overview  Goal: Plan of Care Review  Outcome: Ongoing (interventions implemented as appropriate)  POC reviewed w pt and spouse, who verbalized understanding. Pt VS and neuro checks remain stable and WDL. No acute events overnight. Pt remains free from fall, injury, or skin breakdown. Bed in lowest position, wheels locked, side rails raised x2, and call light w/in reach. Will continue to monitor.        chest pain, palpitations and leg swelling.   Gastrointestinal: Negative for abdominal pain, constipation, diarrhea, nausea and vomiting.   Endocrine: Negative for cold intolerance, heat intolerance, polydipsia, polyphagia and polyuria.   Genitourinary: Negative for dysuria.   Musculoskeletal: Positive for myalgias.   Skin: Negative for rash and wound.   Allergic/Immunologic: Negative for immunocompromised state.   Neurological: Negative for dizziness, tremors and headaches.   Hematological: Does not bruise/bleed easily.   Psychiatric/Behavioral: Positive for sleep disturbance (+snoring ). Negative for behavioral problems.       Physical Exam   Constitutional: He is oriented to person, place, and time. He appears well-developed and well-nourished. No distress.   HENT:   Right Ear: External ear normal.   Left Ear: External ear normal.   Nose: Nose normal.   Hearing normal    Neck: No tracheal deviation present. No thyromegaly present.   Cardiovascular: Normal rate and regular rhythm.    No murmur heard.  Pulses:       Dorsalis pedis pulses are 3+ on the right side, and 3+ on the left side.   Pulmonary/Chest: Effort normal and breath sounds normal.   Abdominal: Soft. He exhibits no mass.   No hernia noted   Musculoskeletal: He exhibits no edema.        Right foot: There is no deformity.        Left foot: There is no deformity.   Feet:   Right Foot:   Protective Sensation: 10 sites tested. 10 sites sensed.   Skin Integrity: Negative for ulcer, blister, skin breakdown, erythema, warmth, callus or dry skin.   Left Foot:   Protective Sensation: 10 sites tested. 10 sites sensed.   Skin Integrity: Negative for ulcer, blister, skin breakdown, erythema, warmth, callus or dry skin.   Neurological: He is alert and oriented to person, place, and time. No cranial nerve deficit or sensory deficit. Coordination and gait normal.        Skin: Skin is warm and dry. No rash noted.   +acanthosis and skin tags  No supraclavicular  fulness  Normal proximal muscle strength     Psychiatric: He has a normal mood and affect. Judgment normal.   Nursing note and vitals reviewed.    Labs:  Hemoglobin A1C   Date Value Ref Range Status   01/10/2017 8.7 (H) 4.5 - 6.2 % Final     Comment:     According to ADA guidelines, hemoglobin A1C <7.0% represents  optimal control in non-pregnant diabetic patients.  Different  metrics may apply to specific populations.   Standards of Medical Care in Diabetes - 2016.  For the purpose of screening for the presence of diabetes:  <5.7%     Consistent with the absence of diabetes  5.7-6.4%  Consistent with increasing risk for diabetes   (prediabetes)  >or=6.5%  Consistent with diabetes  Currently no consensus exists for use of hemoglobin A1C  for diagnosis of diabetes for children.     01/10/2017 8.7 (H) 4.5 - 6.2 % Final     Comment:     According to ADA guidelines, hemoglobin A1C <7.0% represents  optimal control in non-pregnant diabetic patients.  Different  metrics may apply to specific populations.   Standards of Medical Care in Diabetes - 2016.  For the purpose of screening for the presence of diabetes:  <5.7%     Consistent with the absence of diabetes  5.7-6.4%  Consistent with increasing risk for diabetes   (prediabetes)  >or=6.5%  Consistent with diabetes  Currently no consensus exists for use of hemoglobin A1C  for diagnosis of diabetes for children.     06/23/2016 7.9 (H) 4.5 - 6.2 % Final   03/24/2016 9.6 (H) 4.5 - 6.2 % Final   11/19/2015 7.8 (H) 4.5 - 6.2 % Final     Lab Results   Component Value Date    CHOL 84 (L) 06/23/2016    HDL 28 (L) 06/23/2016    LDLCALC 43.2 (L) 06/23/2016    TRIG 64 06/23/2016    CHOLHDL 33.3 06/23/2016     Lab Results   Component Value Date     01/10/2017    K 4.5 01/10/2017     01/10/2017    CO2 25 01/10/2017     (H) 01/10/2017    BUN 15 01/10/2017    CREATININE 1.3 01/10/2017    CALCIUM 9.1 01/10/2017    PROT 7.3 06/23/2016    ALBUMIN 3.8 06/23/2016     BILITOT 0.4 06/23/2016    ALKPHOS 78 06/23/2016    AST 18 06/23/2016    ALT 23 06/23/2016    ANIONGAP 6 (L) 01/10/2017    ESTGFRAFRICA >60.0 01/10/2017    EGFRNONAA >60.0 01/10/2017         Assessment and Plan:  Uncontrolled type 2 diabetes mellitus without complication, without long-term current use of insulin  - discussed disease progression, risks and complications of uncontrolled diabetes   - discussed blood glucose goals   - reviewed treatment options : GLP1 , SGLT 2i , pt declined injectable therapy   - start Jardiance 10 mg qd, reviewed use and side effects  - encouraged to stay well hydrated while taking Jardiance   - continue Metformin and Januvia as previously prescribed     - continue to SMBG 2 times daily and bring glucose log or meter to office visit for review  - continue to follow dietary and lifestyle modifications   - reviewed signs and symptoms of hypoglycemia along with appropriate treatment protocol   -     empagliflozin (JARDIANCE) 10 mg Tab; Take 10 mg by mouth once daily.  Dispense: 30 tablet; Refill: 4  -     Comprehensive metabolic panel; Future; Expected date: 2/2/17  -     Hemoglobin A1c; Future; Expected date: 2/2/17    Hypertension associated with diabetes  - stable and controlled on ACEi   - discussed the importance of following a low sodium diet   - continue current treatment plan as previously prescribed     Hyperlipidemia associated with type 2 diabetes mellitus  - on statin therapy   - discussed the importance of following a low fat, low chol diet   - encouraged to continue exercise as tolerated      Vitamin D deficiency  -     Vitamin D; Future; Expected date: 2/2/17      Case discussed in consultation with Dr. Rucker  Recommendations were discussed with the patient in detail   The patient verbalized understanding and agrees with the treatment plan as outlined above     RTC in 3 months with labs prior   The patient is instructed to notify the office with BG concerns or questions

## 2017-09-15 NOTE — CONSULTS
"[facility]  Endocrinology  Diabetes Consult Note    Consult Requested by: Edgardo Forrest DO   Reason for admit: Lacunar stroke of left subthalamic region    HISTORY OF PRESENT ILLNESS:  Reason for Consult: Management of T2DM, Hyperglycemia     Diabetes diagnosis year: 9103-0613    Home Diabetes Medications:  Victoza 1.8mg QD, metformin BID, Novolog flextouch 20 units AC  Lab Results   Component Value Date    HGBA1C 10.8 (H) 09/15/2017     How often checking glucose at home? 1-3 x day   BG readings on regimen: 325-350s  Hypoglycemia on the regimen?  No  Missed doses on regimen?  None recently as wife has been assisting with BG monitoring and insulin administration since 2017.     Diabetes Complications include:   Hyperglycemia    Complicating diabetes co morbidities: Glucocorticoid use  and Dementia    HPI: Patient is a 57 y.o. male with a diagnosis of T2DM based on bloodwork. He is managed by CHANCE Steele NP on [facility]. Last seen 3/2017 when converted from Bydureon to Victoza and Levemir to Tresiba. Tresiba is NOT covered by insurance. He is now receiving Medicaid.  Never hospitalized r/t DM.     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 He underwent chemo with steroid therapy since May 2017 which contributed to extreme BG elevations in 300 range. He has been confused, unsteady, frequent falls at home, fatigue "sleeps up to 20 hours a day".  He was recently admitted with AMS. MRI revealed CVA. [place] [facility] [place] Started on low dose MDI. Plans for Solumedrol 1000mg IV x 3 days (9/15 - ).  Endocrine consulted for BG mgmt.     Interval HPI:   Overnight events: BG in 200 range on low dose MDI  Eatin%  Nausea: No  Hypoglycemia and intervention: No  Fever: No  TPN and/or TF: No    PMH, PSH, FH, SH reviewed     Review of Systems   Constitutional: + fatigue Negative for weight changes.  Eyes: Negative for visual disturbance.  Respiratory: Negative for " cough.   Cardiovascular: Negative for chest pain.  Gastrointestinal: Negative for nausea.  Endocrine: Negative for polyuria, polydipsia.  Musculoskeletal: + gait imbalance/ unsteady; + falls at home  Skin: Negative for rash.  Neurological: Negative for syncope.  Psychiatric/Behavioral: + depression.    Current Medications and/or Treatments Impacting Glycemic Control  Steroids:   Hormones     Start     Stop Route Frequency Ordered    09/15/17 1052  methylPREDNISolone sodium succinate (SOLU-MEDROL) 1,000 mg in dextrose 5 % 100 mL IVPB      09/18 0859 IV Daily 09/15/17 1053        Hyperglycemia/Diabetes Medications:   Antihyperglycemics     Start     Stop Route Frequency Ordered    09/15/17 1645  insulin aspart pen 5 Units      -- SubQ 3 times daily with meals 09/15/17 1333    09/15/17 1332  insulin detemir pen 8 Units      -- SubQ PM 09/15/17 1332    09/15/17 0103  insulin aspart pen 1-10 Units      -- SubQ Before meals & nightly PRN 09/15/17 0003            PHYSICAL EXAMINATION:Vitals:    09/15/17 0800   BP: 124/75   Pulse: 90   Resp: 20   Temp: 98.8 °F (37.1 °C)     Body mass index is 33.95 kg/m².    Physical Exam   Constitutional:  Well developed, well nourished, NAD.  ENT: External ears no masses with nose patent; normal hearing.   Neck:  Supple; trachea midline.   Cardiovascular: Normal heart sounds, no LE edema.     Lungs:  Normal effort; lungs anterior bilaterally clear to auscultation.  Abdomen:  Soft, no masses,  no hernias.  MS: No clubbing or cyanosis of nails noted;  unable to assess gait.  Skin: No rashes, lesions, or ulcers; no nodules.  Psychiatric: normal mood and affect.        Labs Reviewed and Include     Recent Labs  Lab 09/15/17  0424   *   CALCIUM 8.4*   ALBUMIN 3.1*   PROT 6.2      K 2.8*   CO2 26      BUN 13   CREATININE 0.8   ALKPHOS 70   ALT 19   AST 23   BILITOT 0.8     Lab Results   Component Value Date    WBC 7.06 09/15/2017    HGB 11.5 (L) 09/15/2017    HCT 31.8 (L)  09/15/2017    MCV 77 (L) 09/15/2017     09/15/2017       Recent Labs  Lab 09/11/17  1135   TSH 1.799     Lab Results   Component Value Date    HGBA1C 10.8 (H) 09/15/2017       Nutritional status:   Body mass index is 33.95 kg/m².  Lab Results   Component Value Date    ALBUMIN 3.1 (L) 09/15/2017    ALBUMIN 3.7 09/14/2017    ALBUMIN 3.6 09/11/2017     No results found for: PREALBUMIN    Estimated Creatinine Clearance: 123.9 mL/min (based on SCr of 0.8 mg/dL).    Accu-Checks  Recent Labs      09/15/17   0046  09/15/17   1132   POCTGLUCOSE  269*  338*        ASSESSMENT and PLAN    * Lacunar stroke of left subthalamic region    Seen on MRI  avoid hypoglycemia        Type 2 diabetes mellitus with hyperglycemia, with long-term current use of insulin    BG goal 140-180; recalculated MDI doses based on 109 kg x 0.6u/kg. Home doses MDI uneven basal / bolus insulin doses given steroid use.   Change Levemir to 30 units AM - first dose now.   Change Novolog to 10 units AC - given high dose Solumedrol pulse x 3 days  Continue moderate dose Novolog correction scale coverage.   BG monitoring ac/hs    -order bedside RN to perform insulin pen training for wife as she has never had DM edu/ insulin instruction.     Discharge planning: given Medicaid limited options - continue Victoza, metformin. Anticipate convert Tresiba to Levemir for insurance coverage. Anticipate adjust Novolog AC dose based on steroid dose.   F/u with CHANCE Steele NP if he wishes.           CNS vasculitis    sees Dr. Love outpatient and was taken off of chemotherapy due to decreased WBC count.    - avoid hypoglycemia          Adverse effect of glucocorticoids and synthetic analogues, initial encounter    Solumedrol 1000mg IV QD x 3 9/15/17 - 9/18/17  Anticipate high in BG readings; will increase prandial Novolog AC for better coverage.           Hypokalemia    Lab Results   Component Value Date    K 2.8 (L) 09/15/2017     Replete per primary team           Obesity    Body mass index is 33.95 kg/m².  may contribute to insulin resistance          NAFLD (nonalcoholic fatty liver disease)    Lab Results   Component Value Date    ALT 19 09/15/2017    AST 23 09/15/2017    ALKPHOS 70 09/15/2017    BILITOT 0.8 09/15/2017     Optimize DM control          JOHN (obstructive sleep apnea)    If uncontrolled, may contribute to insulin resistance              Plan discussed with patient and wife at bedside.     GABRIEL Humphrey,ANP-C  Endocrinology  [facility]

## 2017-09-15 NOTE — ASSESSMENT & PLAN NOTE
Lab Results   Component Value Date    ALT 19 09/15/2017    AST 23 09/15/2017    ALKPHOS 70 09/15/2017    BILITOT 0.8 09/15/2017     Optimize DM control

## 2017-09-15 NOTE — CONSULTS
Educated pt on low sodium/heart healthy, diabetic diet per pathway. Paperwork provided.     RD will continue to follow.

## 2017-09-15 NOTE — SUBJECTIVE & OBJECTIVE
Interval HPI:   Overnight events: BG in 200 range on low dose MDI  Eatin%  Nausea: No  Hypoglycemia and intervention: No  Fever: No  TPN and/or TF: No    PMH, PSH, FH, SH reviewed     Review of Systems   Constitutional: + fatigue Negative for weight changes.  Eyes: Negative for visual disturbance.  Respiratory: Negative for cough.   Cardiovascular: Negative for chest pain.  Gastrointestinal: Negative for nausea.  Endocrine: Negative for polyuria, polydipsia.  Musculoskeletal: + gait imbalance/ unsteady; + falls at home  Skin: Negative for rash.  Neurological: Negative for syncope.  Psychiatric/Behavioral: + depression.    Current Medications and/or Treatments Impacting Glycemic Control  Steroids:   Hormones     Start     Stop Route Frequency Ordered    09/15/17 1052  methylPREDNISolone sodium succinate (SOLU-MEDROL) 1,000 mg in dextrose 5 % 100 mL IVPB       0859 IV Daily 09/15/17 1053        Hyperglycemia/Diabetes Medications:   Antihyperglycemics     Start     Stop Route Frequency Ordered    09/15/17 1645  insulin aspart pen 5 Units      -- SubQ 3 times daily with meals 09/15/17 1333    09/15/17 1332  insulin detemir pen 8 Units      -- SubQ PM 09/15/17 1332    09/15/17 0103  insulin aspart pen 1-10 Units      -- SubQ Before meals & nightly PRN 09/15/17 0003            PHYSICAL EXAMINATION:Vitals:    09/15/17 0800   BP: 124/75   Pulse: 90   Resp: 20   Temp: 98.8 °F (37.1 °C)     Body mass index is 33.95 kg/m².    Physical Exam   Constitutional:  Well developed, well nourished, NAD.  ENT: External ears no masses with nose patent; normal hearing.   Neck:  Supple; trachea midline.   Cardiovascular: Normal heart sounds, no LE edema.     Lungs:  Normal effort; lungs anterior bilaterally clear to auscultation.  Abdomen:  Soft, no masses,  no hernias.  MS: No clubbing or cyanosis of nails noted;  unable to assess gait.  Skin: No rashes, lesions, or ulcers; no nodules.  Psychiatric: normal mood and  affect.

## 2017-09-15 NOTE — ASSESSMENT & PLAN NOTE
- Cerebral angiogram in May 2017 consistent with cerebral vasculitis vs multifocal atherosclerotic disease.  - Signs of demyelinating disease vs vasculitis on recent imaging. Pt sees Dr. Love outpatient and was recently taken off chemotherapy due to neutropenia.  - Last seen by Rheum (Dr. Durant) in May 2017 with thorough autoimmune workup negative at that time.  - IP consult to Rheumatology. Recs:   - Likely Primary CNS vasculitis. Pt currently encephalopathic.     - Start low dose Cytoxan or alternatively Rituximab (not much data on using other immunosuppressive treatments.)  - Attending physician spoke with Dr. Love who has concerns for starting pt on Rituximab d/t risk of prolonged immunosuppression without sufficient evidence of its efficacy. She recommends monitoring pt's response to CSTs x3 days and then considering plasma exchange on Monday. If pt responds well to plasma exchange, likely a good indication that Rituximab will be effective and can start that therapy. If plasma exchange is not effective however, will consider re-starting Cyclophosphamide at a lower dose at that time.  - IV Solumedrol 1g x 3 days, likely will contribute to hyperglycemia (Endocrine on board)

## 2017-09-15 NOTE — HPI
This is a 56yo M with PMH HTN, HLD, poorly controlled DMII, JAYDEN, who presented to ED after being notified about MRI results from 9/14/17 which revealed small L subcortical frontal infarct. Patient with no weakness, dizziness, headaches, sensation loss, changes in vision.   Patient with recent history of confusion, falls, and fatigue which started in May 2017. He was initially admitted from 5/13-5/18 with JAYDEN and diagnosed with MS. MRA brain/neck, MRI brain w/wo contrast showed no vascular occlusion, multiple foci of hyperintensity in deep cerebral periventricular white matter in pattern of demyelinating process. He was given 5 days of IV solumedrol which led to improvement in his symptoms.     On 5/24 he was admitted again with worsening double vision, dizziness, syncope and confusion. EEG 5/25 (2 hr) showed moderate disorganization and slowing c/w nonspecific encephalopathy.  On 5/28 he had waxing and waning mental status, MRI suggestive of active demyelination vs ischemia if multiple areas. Given Cytoxan 6/3 for CNS vasculitis with plan to repeat cytoxan once monthly (for total of 3) and discharged home with home health on prednisone 60 mg daily.   Per wife, patient has been sleeping most days and he has not been able to return to work since May. He has gets confused during normal conversation. He has difficulty completing complex tasks. Responded well to first dose of cyclophosphamide. He did well for about 1 week afterwards. Received 2nd treatment of cyclophosphamide in 8/2017. He was scheduled to receive another treatment but did not due to a low white count which was seen on blood work last week. Currently denies any skin ulcers, headaches, abdominal pain, fevers, numbness, arthralgias, changes in vision, blood in urine/stool. Has had bladder/bowel incontinence since May.   Rheumatology consulted regarding management options for possible CNS vasculitis. He was last seen by Rheumatology in May 2017. Thorough  autoimmune workup this far has been negative including REYMUNDO x 2, ANCA x 2, ACE, acl ab, C3, C4, SPEP, NMO also no clinical sign of systemic vasculitis.

## 2017-09-15 NOTE — TELEPHONE ENCOUNTER
Contacted by regional referral center, spoke with Dr. Valdez at [facility]. 56 yo gentleman with CNS vasculitis, HTN, hyperlipidemia, DM, JOHN, prior cerebral ischemic disease with recent workup in May/June this year.  Has been on Cytoxan, previously on prednisone being tapered down (20 mg currently per records, unclear if he has been taking)  Has been experiencing fluctuating mental status, ambulatory difficulty for what appears the past couple of weeks from recent records, worse again at recent follow up with Dr. Love.      MRI done today shows small L subcortical frontal infarct.  EEG today normal.     Will accept for direct admission if possible to stroke unit, otherwise will come to ED first.  Optimize stroke preventive measures, consider increase prednisone, assess therapy needs.  Given negative EEG, no definitive seizure activity would BAKARI/C Keppra

## 2017-09-15 NOTE — H&P
[facility]  Vascular Neurology  Comprehensive Stroke Center  History & Physical    Inpatient consult to Vascular (Stroke) Neurology  Consult performed by: CLARENCE DAVILA  Consult ordered by: CLARENCE DAVILA  Reason for consult: Lacunar stroke/vasculitis        Assessment/Plan:     Lacunar stroke of left subthalamic region    57 y.o. male with significant past medical history of HTN, HLD, DM II, CNS vasculitis-not currently on therapy, discharged in June 2017 for MS vs cardio-embolic vs watershade stroke, recently admitted and discharged for AMS presented to hospital as a transfer from [facility] for evaluation of acute stroke revealed on outpatient MRI imaging.  -Antithrombotics for secondary stroke prevention: Antiplatelets:  Aspirin: 81 mg oral daily  -Statins for secondary stroke prevention and hyperlipidemia, if present: Atorvastatin- 40 mg oral daily  -Aggressive risk factor modification: Hypertension, Diabetes, High Cholesterol and Obesity  -Rehab Efforts: Physical Therapy and Occupational Therapy  -VTE Prophylaxis: Heparin 5000 units SQ every 8 hours        CNS vasculitis    -Signs on recent imaging.  Patient sees Dr. Love outpatient and was recently taken off of chemotherapy due to decreased WBC count.  Consider general neurology consult.        Type 2 diabetes mellitus with hyperglycemia, with long-term current use of insulin    -Stroke risk factor.  Moderate correction SSI.  Recommend glucose 100-130.  Current glucose 338  -Levemir 8 u QHS, Novolog 5 u TIDWM, titrate prn        Essential hypertension    -Stroke risk factor.  Recommend SBP<180.  Continue home meds        Mixed hyperlipidemia    -Stroke risk factor.  Continue Atorvastatin.            Thrombolysis Candidate? No  1. Contraindications: Outside of treament window and Unclear onset of symptoms  2. Warnings: Stroke severity too mild     Interventional Revascularization Candidate?  No; No large vessel  "occlusion    Research Candidate? No     Subjective:     History of Present Illness:  Patient is a 57 y.o. male with significant past medical history of HTN, HLD, DM II, CNS vasculitis-not currently on therapy, discharged in June 2017 for MS vs cardio-embolic vs watershade stroke, recently admitted and discharged for AMS presented to hospital as a transfer from [facility] for evaluation of acute stroke revealed on outpatient MRI imaging.  The patient has no complaints at this time.  The patient's wife reports he was very "sleepy and hard to wake up" yesterday from ~5 pm to ~11 pm.  The patient denies weakness, HA, change in vision.  He and his wife endorse intermittent confusion/difficulty with tasks or conversation that are more complex in nature.  The patient recently discontinued chemotherapy for CNS vasculitis due to a decrease in his WBC count.  His wife states he hallucinates on high dose prednisone.  The patient will be admitted to Vascular Neurology for further evaluation of his symptoms.             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    Mixed hyperlipidemia 11/9/2012    NAFLD (nonalcoholic fatty liver disease) 10/11/2013    CHASE (nonalcoholic steatohepatitis)     Obesity     Stroke     Type 2 diabetes mellitus with diabetic polyneuropathy, with long-term current use of insulin 5/14/2017     Past Surgical History:   Procedure Laterality Date    SKIN CANCER EXCISION       Family History   Problem Relation Age of Onset    Heart disease Mother     Hypertension Mother     Heart failure Mother     Cancer Father      lung    Diabetes Maternal Aunt      Social History   Substance Use Topics    Smoking status: Never Smoker    Smokeless tobacco: Never Used    Alcohol use No     Review of patient's allergies indicates:  No Known Allergies  Medications: I have reviewed the current medication " "administration record.    Current Outpatient Prescriptions:     aspirin (ECOTRIN) 81 MG EC tablet, Take 81 mg by mouth once daily., Disp: , Rfl:     atenolol (TENORMIN) 50 MG tablet, Take 1 tablet (50 mg total) by mouth once daily., Disp: 90 tablet, Rfl: 3    atorvastatin (LIPITOR) 40 MG tablet, Take 1 tablet (40 mg total) by mouth once daily., Disp: 90 tablet, Rfl: 3    diltiaZEM (DILACOR XR) 240 MG CDCR, Take 1 capsule (240 mg total) by mouth once daily., Disp: 90 capsule, Rfl: 3    insulin aspart (NOVOLOG) 100 unit/mL InPn pen, [measured glc - 100]/50 = # units sliding scale, Disp: 3 mL, Rfl: 1    insulin needles, disposable, ([employer] ULTRA-FINE ANA PEN NEEDLES) 32 x 5/32 " Ndle, Inject twice daily, Disp: 100 each, Rfl: 3    levetiracetam (KEPPRA) 500 MG Tab, Take 1 tablet (500 mg total) by mouth 2 (two) times daily., Disp: 60 tablet, Rfl: 2    liraglutide 0.6 mg/0.1 mL, 18 mg/3 mL, subq PNIJ (VICTOZA 3-TOMASZ) 0.6 mg/0.1 mL (18 mg/3 mL) PnIj, Inject 1.8 mg into the skin once daily., Disp: 9 mL, Rfl: 11    metformin (GLUCOPHAGE-XR) 500 MG 24 hr tablet, Take 2 tablets (1,000 mg total) by mouth 2 (two) times daily with meals., Disp: 360 tablet, Rfl: 0    omega-3 fatty acids-vitamin E (FISH OIL) 1,000 mg Cap, Take 1 capsule by mouth 2 (two) times daily., Disp: , Rfl: 0    ranitidine (ZANTAC) 150 MG tablet, Take 1 tablet (150 mg total) by mouth 2 (two) times daily., Disp: 60 tablet, Rfl: 11    sertraline (ZOLOFT) 100 MG tablet, 1 and half tablet daily (Patient taking differently: Take 150 mg by mouth once daily. ), Disp: 135 tablet, Rfl: 3    valsartan (DIOVAN) 320 MG tablet, Take 1 tablet (320 mg total) by mouth once daily., Disp: 90 tablet, Rfl: 3    vitamin D 1000 units Tab, Take 5 tablets (5,000 Units total) by mouth once daily., Disp: , Rfl:       Review of Systems   Constitutional: Positive for fatigue. Negative for chills and fever.   HENT: Negative for drooling and trouble swallowing.    Eyes: Positive " for visual disturbance (intermittent double vision). Negative for pain, discharge and itching.   Respiratory: Negative for shortness of breath.    Cardiovascular: Negative for chest pain and palpitations.   Gastrointestinal: Positive for diarrhea. Negative for abdominal pain, nausea and vomiting.   Endocrine: Negative for cold intolerance and heat intolerance.   Genitourinary: Negative for dysuria and hematuria.   Musculoskeletal: Positive for gait problem (reports frequent falls). Negative for neck pain and neck stiffness.   Skin: Negative for rash.   Allergic/Immunologic: Negative for environmental allergies and food allergies.   Neurological: Negative for dizziness, facial asymmetry, speech difficulty, weakness and headaches.   Hematological: Negative for adenopathy. Does not bruise/bleed easily.   Psychiatric/Behavioral: Positive for confusion (intermittent).     Objective:     Vital Signs (Most Recent):  Temp: 98.5 °F (36.9 °C) (09/14/17 2146)  Pulse: 82 (09/14/17 2317)  Resp: 12 (09/14/17 2317)  BP: (!) 176/97 (09/14/17 2317)  SpO2: 95 % (09/14/17 2317)    Vital Signs Range (Last 24H):  Temp:  [97.3 °F (36.3 °C)-98.5 °F (36.9 °C)]   Pulse:  [73-87]   Resp:  [12-19]   BP: (144-188)/(74-98)   SpO2:  [93 %-100 %]     Physical Exam   Constitutional: He is oriented to person, place, and time. He appears well-developed and well-nourished. No distress.   HENT:   Head: Normocephalic and atraumatic.   Right Ear: External ear normal.   Left Ear: External ear normal.   Nose: Nose normal.   Mouth/Throat: Oropharynx is clear and moist. No oropharyngeal exudate.   Eyes: Conjunctivae and EOM are normal. Pupils are equal, round, and reactive to light. Right eye exhibits no discharge. Left eye exhibits no discharge. No scleral icterus.   Neck: Normal range of motion. Neck supple. No thyromegaly present.   Cardiovascular: Normal rate, regular rhythm, normal heart sounds and intact distal pulses.    No murmur  heard.  Pulmonary/Chest: Effort normal and breath sounds normal. No respiratory distress. He has no wheezes.   Abdominal: Soft. Bowel sounds are normal. He exhibits no distension. There is no tenderness.   Musculoskeletal: He exhibits no edema.   Lymphadenopathy:     He has no cervical adenopathy.   Neurological: He is alert and oriented to person, place, and time. GCS eye subscore is 4 - spontaneous. GCS verbal subscore is 5 - oriented. GCS motor subscore is 6 - obeys commands.   Skin: Skin is warm and dry. Capillary refill takes less than 2 seconds. He is not diaphoretic.   Psychiatric: He has a normal mood and affect.   Nursing note and vitals reviewed.      Neurological Exam:   LOC: alert and follows requests  Language: No aphasia  Speech: No dysarthria  Orientation: Person, Place, Time  Visual Fields (CN II): Full  EOM (CN III, IV, VI): Full/intact  Pupils (CN III, IV, VI): PERRL  Facial Movement (CN VII): symmetric facial expression  Tongue (CN XII): to midline  Motor*: Arm Left:  Normal (5/5), Leg Left:   Normal (5/5), Arm Right:   Normal (5/5), Leg Right:   Normal (5/5)  Cerebellar*: no dysmetria  Sensation: intact to light touch, temperature and vibration    NIH Stroke Scale:  Interval: baseline (upon arrival/admit)  Level of Consciousness: 0 - alert  LOC Questions: 0 - answers both correctly  LOC Commands: 0 - performs both correctly  Best Gaze: 0 - normal  Visual: 0 - no visual loss  Facial Palsy: 0 - normal  Motor Left Arm: 0 - no drift  Motor Right Arm: 0 - no drift  Motor Left Le - no drift  Motor Right Le - no drift  Limb Ataxia: 0 - absent  Sensory: 0 - normal  Best Language: 0 - no aphasia  Dysarthria: 0 - normal articulation  Extinction and Inattention: 0 - no neglect  NIH Stroke Scale Total: 0  Jarod Coma Scale:  Best Eye Response: 4 - spontaneous  Best Motor Response: 6 - obeys commands  Best Verbal Response: 5 - oriented  [place] Coma Scale Total: 15  Modified [place] Scale:   Timeline:  Prior to symptoms onset  Modified [place] Score: 1 - no significant disability        Laboratory:  CMP:   Recent Labs  Lab 09/14/17  1810   CALCIUM 9.7   ALBUMIN 3.7   PROT 7.3      K 2.9*   CO2 25      BUN 19   CREATININE 1.0   ALKPHOS 82   ALT 21   AST 16   BILITOT 0.6     CBC:   Recent Labs  Lab 09/14/17  1810   WBC 9.93   RBC 4.33*   HGB 12.1*   HCT 34.3*      MCV 79*   MCH 27.9   MCHC 35.3     Lipid Panel: No results for input(s): CHOL, LDLCALC, HDL, TRIG in the last 168 hours.  Coagulation:   Recent Labs  Lab 09/14/17  1810   INR 1.0     Hgb A1C: No results for input(s): HGBA1C in the last 168 hours.  TSH:   Recent Labs  Lab 09/11/17  1135   TSH 1.799       Diagnostic Results:  Brain Imaging: MRI Head. Date: 9/14/17  Acute Pathology: Infarct  Location: Frontal:  left  Old Vascular Pathology: None  Location: N/A    Cerebrovascular Imaging: none    Cervical Vascular Imaging: none    Cardiac Evaluation: Trans-thoracic. Date: 05/30/17  Key Findings: Enlarged left atria  EKG/Telemetry: Sinus rhythm      Georgette Leon NP  [facility]  Department of Vascular Neurology   [facility]

## 2017-09-15 NOTE — HPI
"Reason for Consult: Management of T2DM, Hyperglycemia     Diabetes diagnosis year: 8027-8561    Home Diabetes Medications:  Victoza 1.8mg QD, metformin BID, Levemir flextouch 20 units PM, Novolog flextouch 20 units AC  Lab Results   Component Value Date    HGBA1C 10.8 (H) 09/15/2017     How often checking glucose at home? 1-3 x day   BG readings on regimen: 325-350s  Hypoglycemia on the regimen?  No  Missed doses on regimen?  None recently as wife has been assisting with BG monitoring and insulin administration since 5/2017.     Diabetes Complications include:   Hyperglycemia    Complicating diabetes co morbidities: Glucocorticoid use  and Dementia    HPI: Patient is a 57 y.o. male with a diagnosis of T2DM based on bloodwork. He is managed by CHANCE Steele NP on [facility]. Last seen 3/2017 when converted from Bydureon to Victoza and Levemir to Tresiba. Tresiba is NOT covered by insurance. He is now receiving Medicaid.  Never hospitalized r/t DM.     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 He underwent chemo with steroid therapy since May 2017 which contributed to extreme BG elevations in 300 range. He has been confused, unsteady, frequent falls at home, fatigue "sleeps up to 20 hours a day".  He was recently admitted with AMS. MRI revealed CVA. [place] [facility] [place] Started on low dose MDI. Plans for Solumedrol 1000mg IV x 3 days (9/15 - 9/18).  Endocrine consulted for BG mgmt.   "

## 2017-09-15 NOTE — ASSESSMENT & PLAN NOTE
sees Dr. Love outpatient and was taken off of chemotherapy due to decreased WBC count.    - avoid hypoglycemia

## 2017-09-15 NOTE — PLAN OF CARE
Problem: Occupational Therapy Goal  Goal: Occupational Therapy Goal  Goals set 9/15 to be addressed for 7 days with expiration date, 9/22:  Patient will increase functional independence with ADLs by performing:    Patient will demonstrate rolling to the right with modified independence.  Not met   Patient will demonstrate rolling to the left with modified independence.   Not met  Patient will demonstrate supine -sit with modified independence.   Not met  Patient will demonstrate stand pivot transfers with modified independence.   Not met  Patient will demonstrate grooming while standing with modified independence.   Not met  Patient will demonstrate upper body dressing with modified independence.   Not met  Patient will demonstrate lower body dressing with modified independence.   Not met  Patient will demonstrate toileting with modified independence.   Not met  Patient's family / caregiver will demonstrate independence and safety with assisting patient with self-care skills and functional mobility.     Not met  Patient and/or patient's family will verbalize understanding of stroke prevention guidelines, personal risk factors and stroke warning signs via teachback method.  Not met         OT evaluation completed.  COLTON Cano  9/15/2017

## 2017-09-15 NOTE — ASSESSMENT & PLAN NOTE
-Signs on recent imaging.  Patient sees Dr. Love outpatient and was recently taken off of chemotherapy due to decreased WBC count.  Consider general neurology consult.

## 2017-09-15 NOTE — HPI
"Patient is a 57 y.o. male with significant past medical history of HTN, HLD, DM II, CNS vasculitis-not currently on therapy, discharged in June 2017 for MS vs cardio-embolic vs watershade stroke, recently admitted and discharged for AMS presented to hospital as a transfer from [facility] for evaluation of acute stroke revealed on outpatient MRI imaging.  The patient has no complaints at this time.  The patient's wife reports he was very "sleepy and hard to wake up" yesterday from ~5 pm to ~11 pm.  The patient denies weakness, HA, change in vision.  He and his wife endorse intermittent confusion/difficulty with tasks or conversation that are more complex in nature.  The patient recently discontinued chemotherapy for CNS vasculitis due to a decrease in his WBC count.  His wife states he hallucinates on high dose prednisone.  The patient will be admitted to Vascular Neurology for further evaluation of his symptoms.      "

## 2017-09-15 NOTE — PT/OT/SLP EVAL
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    Mixed hyperlipidemia 11/9/2012    NAFLD (nonalcoholic fatty liver disease) 10/11/2013    CHASE (nonalcoholic steatohepatitis)     Obesity     Stroke     Type 2 diabetes mellitus with diabetic polyneuropathy, with long-term current use of insulin 5/14/2017      Past Surgical History:   Procedure Laterality Date    SKIN CANCER EXCISION         Referring physician: Carolyn  Date referred to OT: 9/14  General Precautions: Standard, aspiration, fall, seizure  Orthopedic Precautions: N/A  Braces: N/A    Do you have any cultural, spiritual, Gnosticism conflicts, given your current situation?: Confucianism     Patient History:  Prior level of function:   Per patient and wife:  Patient resides in [place] with wife in 2 story home with bedroom on the first floor, no steps.  Patient is right handed.  PTA patient independent with eating, dressing, grooming; assistance provided for bathing and toileting.  Wears depends; incontinent.  Not driving since May 2017.  DME:  cane, which he occasionally uses.  Retired: . Hobbies:  TV, fishing.  Roles/Responsibilities:  , father, Prior to May involved in the following:  cooking, laundry, grocery shopping.  Since May, day consists of:  waking up at 6am, napping from 9am-2pm, watching TV and then going to bed at 8pm.     Subjective:  Communicated with nurse prior to session.  Patient:  "I had a stroke."  Wife: "I am exhausted.  He doesn't know how to operate the TV remote.  The " "doctor ordered HH and I think that will be great."  "He fell 2x in the last 2 weeks and sometimes it takes me 2 hours to get him up."   Pain/Comfort  Pain Rating 1: 0/10  Pain Rating Post-Intervention 1: 0/10    Objective:  Patient found with: peripheral IV, telemetry  Wife present.    Cognitive Exam:  Oriented to: Person, Place, Time and Situation  Follows Commands/attention: Follows one-step commands  Communication: clear/fluent  Memory:  Impaired STM  Safety awareness/insight to disability: intact  Coping skills/emotional control: Appropriate to situation    Visual/perceptual:  Patient reports double vision is no longer an issue    Physical Exam:  Postural examination/scapula alignment: Rounded shoulder  Skin integrity: Visible skin intact    Sensation:   Intact    Upper Extremity Range of Motion:  Right Upper Extremity: WNL  Left Upper Extremity: WNL    Upper Extremity Strength:  Right Upper Extremity: WNL  Left Upper Extremity: WNL    Functional Mobility:  Bed Mobility:  Rolling/Turning to Left: Supervision  Rolling/Turning Right: Supervision  Scooting/Bridging: Supervision  Supine to Sit: Supervision  Sit to Supine: Supervision    Transfers:  Sit <> Stand Assistance: Stand By Assistance  Sit <> Stand Assistive Device: No Assistive Device  Bed <> Chair Technique: Stand Pivot  Bed <> Chair Transfer Assistance: Stand By Assistance    Activities of Daily Living:  UE Dressing Level of Assistance: Stand by assistance  LE Dressing Level of Assistance: Stand by assistance  Grooming Position: Standing  Grooming Level of Assistance: Stand by assistance    Additional Treatment:   Patient/ Family education provided for stroke warning signs, prevention guidelines and personal risk factors.  Patient/ Family verbalizing understanding via teach back method.  Patient education provided on role of OT and need for HH upon discharge.   Patient verbalizing understanding via teach back method. Patient and family instructed on need " "to call for assistance for toileting needs and when getting up.  Continued education, patient/ family training recommended.  Patient/ caregiver education provided on the importance of occupational balance and suggestions provided on ways to meet their role responsibilities.  Discussion initiated on identifying ways to still engage in meaningful activities.  Opportunities provided for hands on practice and return demonstration.  Continued training recommended.  Patient alert and oriented x 3; able to follow 4/4 one step commands.  Patient attentive and interactive throughout the session.  Patient able to identify 5/5 body parts.  Able to name 5/5 objects.  Able to sequence 7/7 days of the week and 12/12 months of the year.  Patient's functional status and disposition recommendation discussed with stroke team in daily rounds.  White board updated in patient's room.  OT asked if there were any other questions; patient/ family had no further questions.         AM-PAC 6 CLICK ADL  How much help from another person does this patient currently need?  1 = Unable, Total/Dependent Assistance  2 = A lot, Maximum/Moderate Assistance  3 = A little, Minimum/Contact Guard/Supervision  4 = None, Modified [place]/Independent    Putting on and taking off regular lower body clothing? : 3  Bathing (including washing, rinsing, drying)?: 3  Toileting, which includes using toilet, bedpan, or urinal? : 3  Putting on and taking off regular upper body clothing?: 3  Taking care of personal grooming such as brushing teeth?: 3  Eating meals?: 3  Total Score: 18    AM-PAC Raw Score CMS "G-Code Modifier Level of Impairment Assistance   6 % Total / Unable   7 - 9 CM 80 - 100% Maximal Assist   10-14 CL 60 - 80% Moderate Assist   15 - 19 CK 40 - 60% Moderate Assist   20 - 22 CJ 20 - 40% Minimal Assist   23 CI 1-20% SBA / CGA   24 CH 0% Independent/ Mod I       Patient left up in chair with all lines intact and call button in " reach    Assessment:  Bessy Nunez is a 57 y.o. male with a medical diagnosis of left basal ganglia stroke and presents with performance deficits of physical skills including impaired balance, mobility, and endurance; demonstrating performance deficits of cognitive skills including impaired  attention, perception, understanding, problem solving, sequencing and memory all resulting in inability organizing occupational performance in a timely and safe manner.  These performance deficits have resulted in activity limitations including but not limited to:  bed mobility, transfers, ascending/ descending stairs, walking short and long distances, walking around obstacles, transitional movement patterns (kneeling, bending); eating, upper body dressing, lower body dressing, brushing teeth, toileting, bathing, and carrying objects.   Patient's role as , father and independent caretaker for self has been affected. Patient will benefit from skilled OT services to maximize level of independence with self-care skills and functional mobility.  Will benefit from HH.    Pt evaluation falls under low complexity for evaluation coding due to performance deficits noted in 1-3 areas as stated above and 0 co-morbities affecting current functional status. Data obtained from problem focused assessments. No modifications or assistance was required for completion of evaluation. Only brief occupational profile and history review completed.    Rehab identified problem list/impairments: Rehab identified problem list/impairments: weakness, impaired endurance, impaired self care skills, gait instability, impaired functional mobilty, impaired balance, impaired cognition, decreased coordination, decreased safety awareness    Rehab potential is good.    Activity tolerance: Good    Discharge recommendations: Discharge Facility/Level Of Care Needs: home health OT     Barriers to discharge: Barriers to Discharge: None    Equipment  recommendations: 3-in-1 commode, bath bench     GOALS:    Occupational Therapy Goals        Problem: Occupational Therapy Goal    Goal Priority Disciplines Outcome Interventions   Occupational Therapy Goal     OT, PT/OT     Description:  Goals set 9/15 to be addressed for 7 days with expiration date, 9/22:  Patient will increase functional independence with ADLs by performing:    Patient will demonstrate rolling to the right with modified independence.  Not met   Patient will demonstrate rolling to the left with modified independence.   Not met  Patient will demonstrate supine -sit with modified independence.   Not met  Patient will demonstrate stand pivot transfers with modified independence.   Not met  Patient will demonstrate grooming while standing with modified independence.   Not met  Patient will demonstrate upper body dressing with modified independence.   Not met  Patient will demonstrate lower body dressing with modified independence.   Not met  Patient will demonstrate toileting with modified independence.   Not met  Patient's family / caregiver will demonstrate independence and safety with assisting patient with self-care skills and functional mobility.     Not met  Patient and/or patient's family will verbalize understanding of stroke prevention guidelines, personal risk factors and stroke warning signs via teachback method.  Not met                           PLAN:  Patient to be seen 6 x/week to address the above listed problems via self-care/home management, therapeutic exercises, sensory integration, neuromuscular re-education, therapeutic activities, cognitive retraining  Plan of Care expires: 10/13/17  Plan of Care reviewed with: patient, spouse    OT G-codes  Functional Assessment Tool Used: FIM  Score: 5  Functional Limitation: Self care  Self Care Current Status (): CHET  Self Care Goal Status (): COLTON Farrar  09/15/2017

## 2017-09-15 NOTE — ASSESSMENT & PLAN NOTE
57 y.o. male with significant past medical history of HTN, HLD, DM II, CNS vasculitis (not currently on therapy), discharged in June 2017 for MS vs cardio-embolic vs watershade stroke. Cerebral angiogram in May 2017 consistent with cerebral vasculitis vs multifocal atherosclerotic disease. Recently admitted and discharged for AMS. Pt presented to hospital as a transfer from [facility] for evaluation of acute stroke revealed on outpatient MRI imaging.    - Antithrombotics for secondary stroke prevention: Antiplatelets: Aspirin: 81 mg oral daily  - Statins for secondary stroke prevention and hyperlipidemia, if present: Atorvastatin- 40 mg oral daily  - Aggressive risk factor modification: Hypertension, Diabetes, High Cholesterol and Obesity  - Rehab Efforts: Physical Therapy, Occupational Therapy, and Speech Therapy  - VTE Prophylaxis: Heparin 5000 units SQ every 8 hours

## 2017-09-15 NOTE — TELEPHONE ENCOUNTER
Contacted by regional referral center, spoke with Dr. Valdez at [facility] ED. 58 yo gentleman with CNS vasculitis, HTN, hyperlipidemia, DM, JOHN, prior cerebral ischemic disease with recent workup in May/June this year.  Has been on Cytoxan, previously on prednisone being tapered down (20 mg currently per records, unclear if he has been taking)  Has been experiencing fluctuating mental status, ambulatory difficulty for what appears the past couple of weeks from recent records, worse again at recent follow up with Dr. Love.      MRI done today shows small L subcortical frontal infarct.  EEG today normal.     Will accept for direct admission if possible to stroke unit, otherwise will come to ED first.  Optimize stroke preventive measures, consider increase prednisone, assess therapy needs.  Given negative EEG, no definitive seizure activity would BAKARI/C Keppra

## 2017-09-15 NOTE — PLAN OF CARE
Edgar Nova MD     Extended Emergency Contact Information  Primary Emergency Contact: Bekah Nunez  Address: [address] [place] [address] [facility]  Mobile Phone: [phone]  Relation: Spouse       [facility] - [place] - [address]  [address]  [place] [address]  Phone: [phone] Fax: [phone]    Payor: MEDICAID / Plan: [facility] [employer] [facility] / Product Type: Managed Medicaid /         09/15/17 1546   Discharge Assessment   Assessment Type Discharge Planning Assessment   Confirmed/corrected address and phone number on facesheet? Yes   Assessment information obtained from? Caregiver;Medical Record   Expected Length of Stay (days) 3   Communicated expected length of stay with patient/caregiver yes   Prior to hospitilization cognitive status: Alert/Oriented   Prior to hospitalization functional status: Independent   Current cognitive status: Unable to Assess   Current Functional Status: Needs Assistance   Lives With spouse   Able to Return to Prior Arrangements unable to determine at this time (comments)   Is patient able to care for self after discharge? Unable to determine at this time (comments)   Who are your caregiver(s) and their phone number(s)? Bekah Nunez   Patient's perception of discharge disposition home or selfcare;home health   Readmission Within The Last 30 Days current reason for admission unrelated to previous admission   If yes, [facility]   Patient currently being followed by outpatient case management? No   Patient currently receives any other outside agency services? No   Equipment Currently Used at Home shower chair   Do you have any problems affording any of your prescribed medications? No   Is the patient taking medications as prescribed? yes   Does the patient have transportation home? Yes   Transportation Available family or friend will provide   Dialysis Name and Scheduled days n/a   Does the  patient receive services at the [facility]mad[facility]? No   Discharge Plan A Home with family;Home Health   Discharge Plan B Rehab   Patient/Family In Agreement With Plan yes   Readmission Questionnaire   At the time of your discharge, did someone talk to you about what your health problems were? Yes   At the time of discharge, did someone talk to you about what to watch out for regarding worsening of your health problem? Yes   At the time of discharge, did someone talk to you about what to do if you experienced worsening of your health problem? Yes   At the time of discharge, did someone talk to you about which medication to take when you left the hospital and which ones to stop taking? Yes   At the time of discharge, did someone talk to you about when and where to follow up with a doctor after you left the hospital? Yes   What do you believe caused you to be sick enough to be re-admitted? stroke   How often do you need to have someone help you when you read instructions, pamphlets, or other written material from your doctor or pharmacy? Sometimes   Do you have problems taking your medications as prescribed? No   Do you have any problems affording any of  your prescribed medications? No   Do you have problems obtaining/receiving your medications? No   Does the patient have transportation to healthcare appointments? Yes   Does the patient have family/friends to help with healtcare needs after discharge? yes   Does your caregiver provide all the help you need? Yes   Are you currently feeling confused? Yes   Are you currently having problems thinking? Yes   Are you currently having memory problems? Yes   Have you felt down, depressed, or hopeless? 0   Have you felt little interest or pleasure in doing things? 0   In the last 7 days, my sleep quality was: fair

## 2017-09-15 NOTE — ED PROVIDER NOTES
Encounter Date: 9/14/2017    SCRIBE #1 NOTE: I, Shital Meadows, am scribing for, and in the presence of, Dr. Perkins.       History     Chief Complaint   Patient presents with    transfer     pt presents to the ed from [facility] for eval of cva no deficits noted      Time patient was seen by the provider: 10:04 PM      The patient is a 57 y.o. male with hx of: MS, DM II, HTN and HLD that presents to the ED as a transfer from [place] for further evaluation of findings on MRI. Pt has hx of a stroke 14 years ago. Pt denies numbness/weakness in extremities, aphasia, SOB, chest pain and hx of a-fib.       The history is provided by the patient and medical records.     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    Mixed hyperlipidemia 11/9/2012    NAFLD (nonalcoholic fatty liver disease) 10/11/2013    CHASE (nonalcoholic steatohepatitis)     Obesity     Stroke     Type 2 diabetes mellitus with diabetic polyneuropathy, with long-term current use of insulin 5/14/2017     Past Surgical History:   Procedure Laterality Date    SKIN CANCER EXCISION       Family History   Problem Relation Age of Onset    Heart disease Mother     Hypertension Mother     Heart failure Mother     Cancer Father      lung    Diabetes Maternal Aunt      Social History   Substance Use Topics    Smoking status: Never Smoker    Smokeless tobacco: Never Used    Alcohol use No     Review of Systems   Constitutional: Negative for chills and fever.   HENT: Negative for congestion.    Eyes: Negative for pain.   Respiratory: Negative for shortness of breath.    Cardiovascular: Negative for chest pain.   Gastrointestinal: Negative for diarrhea and vomiting.   Genitourinary: Negative for difficulty urinating.   Musculoskeletal: Negative for myalgias.   Skin: Negative for rash.   Neurological: Negative for  speech difficulty, weakness, numbness and headaches.       Physical Exam     Initial Vitals [09/14/17 2146]   BP Pulse Resp Temp SpO2   (!) 161/76 79 18 98.5 °F (36.9 °C) 100 %      MAP       104.33         Physical Exam    Vitals reviewed.  Constitutional: He appears well-developed and well-nourished. He is not diaphoretic. No distress.   57 y.o. White male    HENT:   Head: Normocephalic and atraumatic.   Eyes: EOM are normal. Pupils are equal, round, and reactive to light.   Neck: No tracheal deviation present.   Cardiovascular: Normal rate, regular rhythm, normal heart sounds and intact distal pulses.   Pulmonary/Chest: Breath sounds normal. No stridor. No respiratory distress.   Abdominal: Soft. He exhibits no distension. There is no tenderness.   Musculoskeletal: Normal range of motion. He exhibits no edema.   Moving all 4 extremities. No peripheral edema.     Neurological: He is alert and oriented to person, place, and time.   Speech fluid. No naming difficulty. Equal flexion and strength x4.   Psychiatric: His behavior is normal. Thought content normal.         ED Course   Procedures  Labs Reviewed   URINALYSIS   HEMOGLOBIN A1C   POCT GLUCOSE MONITORING CONTINUOUS             Medical Decision Making:   History:   Old Medical Records: I decided to obtain old medical records.  Differential Diagnosis:   My initial differential diagnoses include but are not limited to subacute stroke, feared complaint unfounded and MS.  Clinical Tests:   Lab Tests: Ordered  Radiological Study: Reviewed  Other:   I have discussed this case with another health care provider.            Scribe Attestation:   Scribe #1: I performed the above scribed service and the documentation accurately describes the services I performed. I attest to the accuracy of the note.    Attending Attestation:           Physician Attestation for Scribe:  Physician Attestation Statement for Scribe #1: I, Dr. Perkins, reviewed documentation, as scribed by Shital  English in my presence, and it is both accurate and complete.         Attending ED Notes:   Patient has maintained a conversant mental status throughout his emergency department observation.  He has been urgently evaluated by the vascular neurology service, and will be admitted to their service in stable condition for further therapy and management.          ED Course      Clinical Impression:   The encounter diagnosis was Embolic stroke of left basal ganglia.    Disposition:   Disposition: Admitted  Condition: Stable  Vascular neurology                        Himanshu Perkins MD  09/15/17 0012

## 2017-09-15 NOTE — TELEPHONE ENCOUNTER
Patient was admitted to the hospital on 09/14/17.    Patient's wife informed he will be rescheduled for a hospital follow up, after he's discharged from the hospital.

## 2017-09-15 NOTE — ASSESSMENT & PLAN NOTE
This is a 56 yo M with poorly controlled DM2, HLD, HTN, CVA, CHASE, internuclear ophthalmoplegia of the L eye, who presents with findings of small acute lacunar infarct as well as extensive increased signal intensity involving the periventricular white matter compatible with demyelinating disease versus vasculitis. Symptoms include confusion, drowsiness, and at times dizziness and double vision. Had rheumatological work-up which was negative in May. Patient with no systemic systemic symptoms of vasculitis at this time. Tried cyclophosphamide with some improvement in symptoms but has been unable to receive 3rd dose due to neutropenia. Differentials include primary CNS vasculitis vs MS vs atherosclwill erosis. Does not have headaches which is a common feature of CNS vasculitis.     Plan

## 2017-09-15 NOTE — PT/OT/SLP EVAL
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    Mixed hyperlipidemia 11/9/2012    NAFLD (nonalcoholic fatty liver disease) 10/11/2013    CHASE (nonalcoholic steatohepatitis)     Obesity     Stroke     Type 2 diabetes mellitus with diabetic polyneuropathy, with long-term current use of insulin 5/14/2017      Past Surgical History:   Procedure Laterality Date    SKIN CANCER EXCISION         Referring physician: Georgette Leon NP  Date referred to PT: 9/14/2017    General Precautions: Standard, aspiration, fall, seizure  Orthopedic Precautions: N/A   Braces:              Patient History:  Lives With: spouse  Living Arrangements: house  Home Accessibility: stairs within home  Living Environment Comment: Pt. spouse available, but may be of limited physical assistance.  Equipment Currently Used at Home: cane, quad, crutches      Previous Level of Function:  Ambulation Skills: independent  Transfer Skills: independent  ADL Skills: independent  Work/Leisure Activity: independent    Subjective:  Communicated with nursing prior to session.    Chief Complaint: weakness  Patient goals: to go home    Pain/Comfort  Pain Rating 1: 0/10  Pain Rating Post-Intervention 1: 0/10      Objective:   Patient found with: peripheral IV, telemetry     Cognitive Exam:  Oriented to: Person, Place, Time and Situation    Follows Commands/attention: Follows multistep  commands  Communication: clear/fluent  Safety awareness/insight to disability: slightly impaired    Physical Exam:  Postural examination/scapula alignment: No  postural abnormalities identified    Skin integrity: Visible skin intact  Edema: None noted     Sensation:   Intact    Upper Extremity Range of Motion:  Right Upper Extremity: WFL  Left Upper Extremity: WFL    Upper Extremity Strength:  Right Upper Extremity: WFL  Left Upper Extremity: WFL    Lower Extremity Range of Motion:  Right Lower Extremity: WFL  Left Lower Extremity: WFL    Lower Extremity Strength:  Right Lower Extremity: WFL  Left Lower Extremity: WFL     Fine motor coordination:  Intact    Gross motor coordination: WFL    Functional Mobility:  Bed Mobility:  Rolling/Turning to Left: Stand by assistance  Rolling/Turning Right: Stand by assistance  Scooting/Bridging: Stand by Assistance  Supine to Sit: Stand by Assistance  Sit to Supine: Stand by Assistance    Transfers:  Sit <> Stand Assistance: Stand By Assistance  Sit <> Stand Assistive Device: No Assistive Device  Bed <> Chair Technique: Stand Pivot  Bed <> Chair Assistance: Stand By Assistance  Bed <> Chair Assistive Device: No Assistive Device    Gait:   Gait Distance: 380'  Assistance 1: Stand by Assistance  Gait Assistive Device: No device  Gait Pattern: 2-point gait  Gait Deviation(s): decreased justin (slight unsteadiness/deviations from path, but able to self-correct and no significant LOB)    Stairs:      Balance:   Static Sit: FAIR+: Able to take MINIMAL challenges from all directions  Dynamic Sit: FAIR+: Maintains balance through MINIMAL excursions of active trunk motion  Static Stand: FAIR+: Takes MINIMAL challenges from all directions  Dynamic stand: FAIR+: Needs CLOSE SUPERVISION during gait and is able to right self with minor LOB    Therapeutic Activities and Exercises:  Discussed PT POC.    AM-PAC 6 CLICK MOBILITY  How much help from another person does this patient currently need?   1 = Unable, Total/Dependent Assistance  2 = A lot, Maximum/Moderate Assistance  3 = A little, Minimum/Contact Guard/Supervision  4 = None, Modified  [place]/Independent    Turning over in bed (including adjusting bedclothes, sheets and blankets)?: 4  Sitting down on and standing up from a chair with arms (e.g., wheelchair, bedside commode, etc.): 4  Moving from lying on back to sitting on the side of the bed?: 4  Moving to and from a bed to a chair (including a wheelchair)?: 4  Need to walk in hospital room?: 3  Climbing 3-5 steps with a railing?: 3  Total Score: 22     AM-PAC Raw Score CMS G-Code Modifier Level of Impairment Assistance   6 % Total / Unable   7 - 9 CM 80 - 100% Maximal Assist   10 - 14 CL 60 - 80% Moderate Assist   15 - 19 CK 40 - 60% Moderate Assist   20 - 22 CJ 20 - 40% Minimal Assist   23 CI 1-20% SBA / CGA   24 CH 0% Independent/ Mod I     Patient left up in chair with all lines intact and call button in reach.    Assessment:   Bessy Nunez is a 57 y.o. male with a medical diagnosis of Lacunar stroke of left subthalamic region and presents with fair mobility. Pt. cooperative and tolerated treatment well. Pt. would benefit from continued PT to increase strength/endurance and improve functional mobility.    Rehab identified problem list/impairments: Rehab identified problem list/impairments: weakness, impaired endurance, impaired functional mobilty, gait instability, impaired balance, decreased safety awareness    Rehab potential is good.    Activity tolerance: Good    Discharge recommendations: Discharge Facility/Level Of Care Needs: home health PT     Barriers to discharge: Barriers to Discharge: None    Equipment recommendations: Equipment Needed After Discharge: [email or web address]     GOALS:    Physical Therapy Goals        Problem: Physical Therapy Goal    Goal Priority Disciplines Outcome Goal Variances Interventions   Physical Therapy Goal     PT/OT, PT Ongoing (interventions implemented as appropriate)     Description:  Goals to be met by: 9/29/2017     Patient will increase functional independence with mobility by  performin. Supine to sit with Modified Louise  2. Sit to supine with Modified Louise  3. Sit to stand transfer with Supervision  4. Bed to chair transfer with Supervision without AD  5. Gait  x 300 feet with Supervision using Single-point Cane .   6. Lower extremity exercise program x20 reps per handout, with independence                      PLAN:    Patient to be seen 6 x/week to address the above listed problems via gait training, therapeutic activities, therapeutic exercises, neuromuscular re-education  Plan of Care expires: 10/15/17  Plan of Care reviewed with: patient, spouse          Puneet WU Rome, PT  09/15/2017

## 2017-09-15 NOTE — HOSPITAL COURSE
58 yo M with PMHx of HTN, HLD, DM II, CNS vasculitis--not currently on therapy, discharged in June 2017 for MS vs cardio-embolic vs watershade stroke, recently admitted and discharged for AMS presented to hospital as a transfer from [facility] for evaluation of acute stroke revealed on outpatient MRI imaging. Completed 3 d IV solumedrol, Rheumatology and Dr. Love recommend starting cyclophosphamide 600 mg/m2 with Hem/Onc consulted for assistance with infusion regimen and getting patient into outpatient infusion center for regular treatments.  Endocrine to provide recommendations for insulin on discharge.  Plan for monthly cyclophosphamide with Dr. Love and Dr. Olguin on board as well as assistance from Rheumatology and ID for vaccination and antibiotic ppx recommendations.  Follow up arranged with Dr. Olguin/Dr. Love in Neurology Clinic, PCP, Rheumatology, and Endocrine.  Patient's wife also requested [facility] Aide to assist with insulin and BG checks for first couple weeks while they are getting used to his new medication regimen.

## 2017-09-15 NOTE — ASSESSMENT & PLAN NOTE
- Stroke risk factor  - Consult to IP Endocrinology. Recs:   - BG goal 140-180; recalculated MDI doses based on 109 kg x 0.6u/kg. Home doses MDI uneven basal / bolus insulin doses given steroid use.    - Change Levemir to 30 units AM - first dose now.    - Change Novolog to 10 units AC - given high dose Solumedrol pulse x 3 days   - Continue moderate dose Novolog correction scale coverage.    - BG monitoring ac/hs      - Order bedside RN to perform insulin pen training for wife as she has never had DM edu/ insulin instruction.       - Discharge planning: given Medicaid limited options - continue Victoza, metformin. Anticipate convert Tresiba to Levemir for insurance coverage. Anticipate adjust Novolog AC dose based on steroid dose. F/u with CHANCE Steele NP if he wishes.

## 2017-09-15 NOTE — ED TRIAGE NOTES
"Bessy Nunez, a 57 y.o. male presents to the ED via EMS as tx from [place] for evaluation of CVA. Pt has had periods of altered mental status and ambulatory difficulty for "a couple of weeks." MRI done at [place] showed small left subcortical frontal infarct. No deficits noted at this time and AAOx4.      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

## 2017-09-15 NOTE — PROGRESS NOTES
[facility]  Vascular Neurology  Comprehensive Stroke Center  Progress Note    Assessment/Plan:     Bessy Nunez is a 57 y.o. male admitted to Stroke center for fluctuating mental status and ambulatory difficulties over the last couple weeks with new finding of small lacunar infarct of the Left frontal horn on outpatient MRI. Admitted to maximize preventive measures and assess therapy needs.  9/15/17- Pt is doing well, currently no acute complaints. Denies pain, dizziness, weakness, difficulty with speech, numbness/tingling, headache, or vision changes. Inpatient consults to Endocrine for diabetes management in the context of steroid administration and Rheumatology for adequate management of vasculitis. Replenishing K (2.8) and Mg (1.4) today. PT/OT/Speech following.    * Lacunar stroke of left subthalamic region    57 y.o. male with significant past medical history of HTN, HLD, DM II, CNS vasculitis (not currently on therapy), discharged in June 2017 for MS vs cardio-embolic vs watershade stroke. Cerebral angiogram in May 2017 consistent with cerebral vasculitis vs multifocal atherosclerotic disease. Recently admitted and discharged for AMS. Pt presented to hospital as a transfer from [facility] for evaluation of acute stroke revealed on outpatient MRI imaging.    - Antithrombotics for secondary stroke prevention: Antiplatelets: Aspirin: 81 mg oral daily  - Statins for secondary stroke prevention and hyperlipidemia, if present: Atorvastatin- 40 mg oral daily  - Aggressive risk factor modification: Hypertension, Diabetes, High Cholesterol and Obesity  - Rehab Efforts: Physical Therapy, Occupational Therapy, and Speech Therapy  - VTE Prophylaxis: Heparin 5000 units SQ every 8 hours        Hypokalemia    2.8 today   Repleting with KCl 40 meq x1        Hypomagnesemia    1.4 today  Repleting with Mag Sulfate 2g x1        Type 2 diabetes mellitus with hyperglycemia, with long-term current use  of insulin    - Stroke risk factor  - Consult to IP Endocrinology. Recs:   - BG goal 140-180; recalculated MDI doses based on 109 kg x 0.6u/kg. Home doses MDI uneven basal / bolus insulin doses given steroid use.    - Change Levemir to 30 units AM - first dose now.    - Change Novolog to 10 units AC - given high dose Solumedrol pulse x 3 days   - Continue moderate dose Novolog correction scale coverage.    - BG monitoring ac/hs      - Order bedside RN to perform insulin pen training for wife as she has never had DM edu/ insulin instruction.       - Discharge planning: given Medicaid limited options - continue Victoza, metformin. Anticipate convert Tresiba to Levemir for insurance coverage. Anticipate adjust Novolog AC dose based on steroid dose. F/u with CHANCE Steele NP if he wishes.         CNS vasculitis    - Cerebral angiogram in May 2017 consistent with cerebral vasculitis vs multifocal atherosclerotic disease.  - Signs of demyelinating disease vs vasculitis on recent imaging. Pt sees Dr. Love outpatient and was recently taken off chemotherapy due to neutropenia.  - Last seen by Rheum (Dr. Durant) in May 2017 with thorough autoimmune workup negative at that time.  - IP consult to Rheumatology. Recs:   - Likely Primary CNS vasculitis. Pt currently encephalopathic.     - Start low dose Cytoxan or alternatively Rituximab (not much data on using other immunosuppressive treatments.)  - Attending physician spoke with Dr. Love who has concerns for starting pt on Rituximab d/t risk of prolonged immunosuppression without sufficient evidence of its efficacy. She recommends monitoring pt's response to CSTs x3 days and then considering plasma exchange on Monday. If pt responds well to plasma exchange, likely a good indication that Rituximab will be effective and can start that therapy. If plasma exchange is not effective however, will consider re-starting Cyclophosphamide at a lower dose at that time.  - IV Solumedrol 1g x  3 days, likely will contribute to hyperglycemia (Endocrine on board)        JOHN (obstructive sleep apnea)    CPAP qHS  Needs appropriate fitting for mask at home        Essential hypertension    -Stroke risk factor.  Recommend SBP<180.  Continue home meds        Mixed hyperlipidemia    -Stroke risk factor.  Continue Atorvastatin.            Neurologic Chief Complaint: Stroke    Subjective:     Interval History: Patient is seen for follow-up neurological assessment and treatment recommendations. HALINA. Pt currently without any neurologic complaints. Rheumatology and Endocrine on board.    HPI, Past Medical, Family, and Social History remains the same as documented in the initial encounter.     Review of Systems   Constitutional: Negative for chills and fever.   HENT: Negative for congestion, rhinorrhea and sore throat.    Respiratory: Negative for cough and shortness of breath.    Cardiovascular: Negative for chest pain and palpitations.   Gastrointestinal: Negative for nausea and vomiting.   Musculoskeletal: Negative for arthralgias and back pain.   Neurological: Negative for dizziness, weakness and numbness.   Psychiatric/Behavioral: Negative for agitation. The patient is not nervous/anxious.      Scheduled Meds:   aspirin  81 mg Oral Daily    atenolol  50 mg Oral Daily    atorvastatin  40 mg Oral Daily    diltiaZEM  240 mg Oral Daily    heparin (porcine)  5,000 Units Subcutaneous Q8H    insulin aspart  10 Units Subcutaneous TIDWM    insulin detemir  30 Units Subcutaneous Once    levetiracetam  500 mg Oral BID    methylPREDNISolone (SOLU-Medrol) IVPB (doses > 250 mg)   Intravenous Daily    omega-3 fatty acids-fish oil  1 capsule Oral BID    pantoprazole  40 mg Oral Daily    sertraline  150 mg Oral Daily    sodium chloride 0.9%  3 mL Intravenous Q8H    valsartan  320 mg Oral Daily    vitamin D  5,000 Units Oral Daily     Continuous Infusions:   sodium chloride 0.9%       PRN Meds:dextrose 50%,  dextrose 50%, glucagon (human recombinant), glucose, glucose, insulin aspart, labetalol, ondansetron, ondansetron, sodium chloride 0.9%    Objective:     Vital Signs (Most Recent):  Temp: 96.7 °F (35.9 °C) (09/15/17 1500)  Pulse: 82 (09/15/17 1500)  Resp: 20 (09/15/17 1500)  BP: (!) 156/86 (09/15/17 1500)  SpO2: (!) 92 % (09/15/17 1500)  BP Location: Right arm    Vital Signs Range (Last 24H):  Temp:  [96.7 °F (35.9 °C)-98.8 °F (37.1 °C)]   Pulse:  [73-92]   Resp:  [12-20]   BP: (124-188)/(74-98)   SpO2:  [92 %-100 %]   BP Location: Right arm    Physical Exam   Constitutional: He is oriented to person, place, and time. He appears well-developed and well-nourished. No distress.   HENT:   Head: Normocephalic and atraumatic.   Eyes: Conjunctivae and EOM are normal.   Pulmonary/Chest: Effort normal. He exhibits no tenderness.   Musculoskeletal: Normal range of motion. He exhibits no tenderness.   Neurological: He is alert and oriented to person, place, and time. No cranial nerve deficit or sensory deficit.   Skin: Skin is warm and dry.   Psychiatric: He has a normal mood and affect. His behavior is normal.   Vitals reviewed.      Neurological Exam:   LOC: alert and follows requests  Language: No aphasia  Speech: No dysarthria  Orientation: Person, Place, Time  Memory: Age correct, Month correct, Abnormalities: Recent memory- Remembered 1/3 items  Visual Fields (CN II): Full  EOM (CN III, IV, VI): Full/intact  Facial Sensation (CN V): Symmetric  Facial Movement (CN VII): symmetric facial expression  Tongue (CN XII): to midline  Cerebellar*: Normal stance  Sensation: intact to light touch    NIH Stroke Scale:    Level of Consciousness: 0 - alert  LOC Questions: 0 - answers both correctly  LOC Commands: 0 - performs both correctly  Best Gaze: 0 - normal  Visual: 0 - no visual loss  Facial Palsy: 0 - normal  Motor Left Arm: 0 - no drift  Motor Right Arm: 0 - no drift  Motor Left Le - no drift  Motor Right Le - no  drift  Limb Ataxia: 0 - absent  Sensory: 0 - normal  Best Language: 0 - no aphasia  Dysarthria: 0 - normal articulation  Extinction and Inattention: 0 - no neglect  NIH Stroke Scale Total: 0      Laboratory:  CMP:   Recent Labs  Lab 09/15/17  0424   CALCIUM 8.4*   ALBUMIN 3.1*   PROT 6.2      K 2.8*   CO2 26      BUN 13   CREATININE 0.8   ALKPHOS 70   ALT 19   AST 23   BILITOT 0.8     CBC:   Recent Labs  Lab 09/15/17  0424   WBC 7.06   RBC 4.15*   HGB 11.5*   HCT 31.8*      MCV 77*   MCH 27.7   MCHC 36.2*     Lipid Panel:   Recent Labs  Lab 09/15/17  0819   CHOL 190   LDLCALC 69.2   HDL 51   TRIG 349*     Hgb A1C:   Recent Labs  Lab 09/15/17  0046   HGBA1C 10.8*     TSH:   Recent Labs  Lab 09/11/17  1135   TSH 1.799       Diagnostic Results:  I have personally reviewed: MRI Head. Date: 9/14/17         Findings:   1. Findings compatible with a small acute lacunar infarct adjacent to the left frontal horn. Nonspecific enhancement just inferior to this region and extending into the left basal ganglia, as well as within the inferior aspect of the left cerebellum. No evidence of a focal mass.  2. Extensive increased signal intensity involving the periventricular white matter compatible with demyelinating disease versus vasculitis.  3. Clinical correlation and followup recommended.      Shania Feliciano PA-C  Comprehensive Stroke Center  Department of Vascular Neurology   [facility]

## 2017-09-15 NOTE — SUBJECTIVE & OBJECTIVE
Neurologic Chief Complaint: Stroke    Subjective:     Interval History: Patient is seen for follow-up neurological assessment and treatment recommendations. ANGELICA Lester currently without any neurologic complaints. Rheumatology and Endocrine on board.    HPI, Past Medical, Family, and Social History remains the same as documented in the initial encounter.     Review of Systems   Constitutional: Negative for chills and fever.   HENT: Negative for congestion, rhinorrhea and sore throat.    Respiratory: Negative for cough and shortness of breath.    Cardiovascular: Negative for chest pain and palpitations.   Gastrointestinal: Negative for nausea and vomiting.   Musculoskeletal: Negative for arthralgias and back pain.   Neurological: Negative for dizziness, weakness and numbness.   Psychiatric/Behavioral: Negative for agitation. The patient is not nervous/anxious.      Scheduled Meds:   aspirin  81 mg Oral Daily    atenolol  50 mg Oral Daily    atorvastatin  40 mg Oral Daily    diltiaZEM  240 mg Oral Daily    heparin (porcine)  5,000 Units Subcutaneous Q8H    insulin aspart  10 Units Subcutaneous TIDWM    insulin detemir  30 Units Subcutaneous Once    levetiracetam  500 mg Oral BID    methylPREDNISolone (SOLU-Medrol) IVPB (doses > 250 mg)   Intravenous Daily    omega-3 fatty acids-fish oil  1 capsule Oral BID    pantoprazole  40 mg Oral Daily    sertraline  150 mg Oral Daily    sodium chloride 0.9%  3 mL Intravenous Q8H    valsartan  320 mg Oral Daily    vitamin D  5,000 Units Oral Daily     Continuous Infusions:   sodium chloride 0.9%       PRN Meds:dextrose 50%, dextrose 50%, glucagon (human recombinant), glucose, glucose, insulin aspart, labetalol, ondansetron, ondansetron, sodium chloride 0.9%    Objective:     Vital Signs (Most Recent):  Temp: 96.7 °F (35.9 °C) (09/15/17 1500)  Pulse: 82 (09/15/17 1500)  Resp: 20 (09/15/17 1500)  BP: (!) 156/86 (09/15/17 1500)  SpO2: (!) 92 % (09/15/17 1500)  BP  Location: Right arm    Vital Signs Range (Last 24H):  Temp:  [96.7 °F (35.9 °C)-98.8 °F (37.1 °C)]   Pulse:  [73-92]   Resp:  [12-20]   BP: (124-188)/(74-98)   SpO2:  [92 %-100 %]   BP Location: Right arm    Physical Exam   Constitutional: He is oriented to person, place, and time. He appears well-developed and well-nourished. No distress.   HENT:   Head: Normocephalic and atraumatic.   Eyes: Conjunctivae and EOM are normal.   Pulmonary/Chest: Effort normal. He exhibits no tenderness.   Musculoskeletal: Normal range of motion. He exhibits no tenderness.   Neurological: He is alert and oriented to person, place, and time. No cranial nerve deficit or sensory deficit.   Skin: Skin is warm and dry.   Psychiatric: He has a normal mood and affect. His behavior is normal.   Vitals reviewed.      Neurological Exam:   LOC: alert and follows requests  Language: No aphasia  Speech: No dysarthria  Orientation: Person, Place, Time  Memory: Age correct, Month correct, Abnormalities: Recent memory- Remembered 1/3 items  Visual Fields (CN II): Full  EOM (CN III, IV, VI): Full/intact  Facial Sensation (CN V): Symmetric  Facial Movement (CN VII): symmetric facial expression  Tongue (CN XII): to midline  Cerebellar*: Normal stance  Sensation: intact to light touch    NIH Stroke Scale:    Level of Consciousness: 0 - alert  LOC Questions: 0 - answers both correctly  LOC Commands: 0 - performs both correctly  Best Gaze: 0 - normal  Visual: 0 - no visual loss  Facial Palsy: 0 - normal  Motor Left Arm: 0 - no drift  Motor Right Arm: 0 - no drift  Motor Left Le - no drift  Motor Right Le - no drift  Limb Ataxia: 0 - absent  Sensory: 0 - normal  Best Language: 0 - no aphasia  Dysarthria: 0 - normal articulation  Extinction and Inattention: 0 - no neglect  NIH Stroke Scale Total: 0      Laboratory:  CMP:   Recent Labs  Lab 09/15/17  0424   CALCIUM 8.4*   ALBUMIN 3.1*   PROT 6.2      K 2.8*   CO2 26      BUN 13   CREATININE  0.8   ALKPHOS 70   ALT 19   AST 23   BILITOT 0.8     CBC:   Recent Labs  Lab 09/15/17  0424   WBC 7.06   RBC 4.15*   HGB 11.5*   HCT 31.8*      MCV 77*   MCH 27.7   MCHC 36.2*     Lipid Panel:   Recent Labs  Lab 09/15/17  0819   CHOL 190   LDLCALC 69.2   HDL 51   TRIG 349*     Hgb A1C:   Recent Labs  Lab 09/15/17  0046   HGBA1C 10.8*     TSH:   Recent Labs  Lab 09/11/17  1135   TSH 1.799       Diagnostic Results:  I have personally reviewed: MRI Head. Date: 9/14/17         Findings:   1. Findings compatible with a small acute lacunar infarct adjacent to the left frontal horn. Nonspecific enhancement just inferior to this region and extending into the left basal ganglia, as well as within the inferior aspect of the left cerebellum. No evidence of a focal mass.  2. Extensive increased signal intensity involving the periventricular white matter compatible with demyelinating disease versus vasculitis.  3. Clinical correlation and followup recommended.

## 2017-09-15 NOTE — CONSULTS
[facility]  Rheumatology  Consult Note    Patient Name: Bessy Nunez  MRN: [id number]  Admission Date: 9/14/2017  Hospital Length of Stay: 1 days  Code Status: Full Code   Attending Provider: Edgardo Forrest DO  Primary Care Physician: Edgar Nova MD  Principal Problem:Lacunar stroke of left subthalamic region    Inpatient consult to Rheumatology  Consult performed by: BINTA GIBSON  Consult ordered by: EULALIA ACOSTA        Subjective:     HPI: This is a 58yo M with PMH HTN, HLD, poorly controlled DMII, JAYDEN, who presented to ED after being notified about MRI results from 9/14/17 which revealed small L subcortical frontal infarct. Patient with no weakness, dizziness, headaches, sensation loss, changes in vision.   Patient with recent history of confusion, falls, and fatigue which started in May 2017. He was initially admitted from 5/13-5/18 with JAYDEN and diagnosed with MS. MRA brain/neck, MRI brain w/wo contrast showed no vascular occlusion, multiple foci of hyperintensity in deep cerebral periventricular white matter in pattern of demyelinating process. He was given 5 days of IV solumedrol which led to improvement in his symptoms.     On 5/24 he was admitted again with worsening double vision, dizziness, syncope and confusion. EEG 5/25 (2 hr) showed moderate disorganization and slowing c/w nonspecific encephalopathy.  On 5/28 he had waxing and waning mental status, MRI suggestive of active demyelination vs ischemia if multiple areas. Given Cytoxan 6/3 for CNS vasculitis with plan to repeat cytoxan once monthly (for total of 3) and discharged home with home health on prednisone 60 mg daily.   Per wife, patient has been sleeping most days and he has not been able to return to work since May. He has gets confused during normal conversation. He has difficulty completing complex tasks. Responded well to first dose of cyclophosphamide. He did well for about 1 week afterwards. Received 2nd  treatment of cyclophosphamide in 8/2017. He was scheduled to receive another treatment but did not due to a low white count which was seen on blood work last week. Currently denies any skin ulcers, headaches, abdominal pain, fevers, numbness, arthralgias, changes in vision, blood in urine/stool. Has had bladder/bowel incontinence since May.   Rheumatology consulted regarding management options for possible CNS vasculitis. He was last seen by Rheumatology in May 2017. Thorough autoimmune workup this far has been negative including REYMUNDO x 2, ANCA x 2, ACE, acl ab, C3, C4, SPEP, NMO also no clinical sign of systemic vasculitis.    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    Mixed hyperlipidemia 11/9/2012    NAFLD (nonalcoholic fatty liver disease) 10/11/2013    CHASE (nonalcoholic steatohepatitis)     Obesity     Stroke     Type 2 diabetes mellitus with diabetic polyneuropathy, with long-term current use of insulin 5/14/2017       Past Surgical History:   Procedure Laterality Date    SKIN CANCER EXCISION         Immunization History   Administered Date(s) Administered    Influenza Split 11/22/2013       Review of patient's allergies indicates:  No Known Allergies  Current Facility-Administered Medications   Medication Frequency    aspirin EC tablet 81 mg Daily    atenolol tablet 50 mg Daily    atorvastatin tablet 40 mg Daily    dextrose 50% injection 12.5 g PRN    dextrose 50% injection 25 g PRN    diltiaZEM 24 hr capsule 240 mg Daily    glucagon (human recombinant) injection 1 mg PRN    glucose chewable tablet 16 g PRN    glucose chewable tablet 24 g PRN    heparin (porcine) injection 5,000 Units Q8H    insulin aspart pen 1-10 Units QID (AC + HS) PRN    insulin aspart pen 10 Units TIDWM    insulin detemir pen 30 Units Once    labetalol injection 10 mg Q6H PRN    levetiracetam tablet 500 mg BID     methylPREDNISolone sodium succinate (SOLU-MEDROL) 1,000 mg in dextrose 5 % 100 mL IVPB Daily    omega-3 fatty acids-fish oil 340-1,000 mg capsule 1 capsule BID    ondansetron disintegrating tablet 8 mg Q8H PRN    ondansetron injection 4 mg Q12H PRN    pantoprazole EC tablet 40 mg Daily    sertraline tablet 150 mg Daily    sodium chloride 0.9% bolus 500 mL Continuous PRN    sodium chloride 0.9% flush 3 mL Q8H    valsartan tablet 320 mg Daily    vitamin D 1000 units tablet 5,000 Units Daily     Family History     Problem Relation (Age of Onset)    Cancer Father    Diabetes Maternal Aunt    Heart disease Mother    Heart failure Mother    Hypertension Mother        Social History Main Topics    Smoking status: Never Smoker    Smokeless tobacco: Never Used    Alcohol use No    Drug use: No    Sexual activity: Not on file     Review of Systems   Constitutional: Positive for activity change and fatigue. Negative for chills, fever and unexpected weight change.   HENT: Negative for congestion, facial swelling, mouth sores, sinus pressure, sneezing, trouble swallowing and voice change.    Eyes: Negative for photophobia, pain and redness.   Respiratory: Negative for chest tightness and shortness of breath.    Cardiovascular: Negative for chest pain, palpitations and leg swelling.   Gastrointestinal: Negative for abdominal distention, abdominal pain, diarrhea, nausea, rectal pain and vomiting.   Endocrine: Negative for cold intolerance, heat intolerance and polyphagia.   Genitourinary: Negative for difficulty urinating, enuresis and flank pain.        +bowel/bladder incontinence   Musculoskeletal: Negative for arthralgias, gait problem and joint swelling.   Neurological: Positive for dizziness. Negative for tremors, weakness, light-headedness and headaches.        Intermittent dizziness   Psychiatric/Behavioral: Positive for confusion and decreased concentration. Negative for agitation.     Objective:     Vital  Signs (Most Recent):  Temp: 98.8 °F (37.1 °C) (09/15/17 0800)  Pulse: 90 (09/15/17 0800)  Resp: 20 (09/15/17 0800)  BP: 124/75 (09/15/17 0800)  SpO2: 98 % (09/15/17 0800)  O2 Device (Oxygen Therapy): room air (09/15/17 0800) Vital Signs (24h Range):  Temp:  [97.3 °F (36.3 °C)-98.8 °F (37.1 °C)] 98.8 °F (37.1 °C)  Pulse:  [73-92] 90  Resp:  [12-20] 20  SpO2:  [93 %-100 %] 98 %  BP: (124-188)/(74-98) 124/75     Weight: 108.9 kg (240 lb) (09/15/17 0230)  Body mass index is 33.95 kg/m².  Body surface area is 2.33 meters squared.    No intake or output data in the 24 hours ending 09/15/17 1347    Physical Exam   Constitutional: He is oriented to person, place, and time and well-developed, well-nourished, and in no distress.   HENT:   Head: Normocephalic.   Mouth/Throat: No oropharyngeal exudate.   Decrease in pupillary constriction left eye  EOMI   Eyes: EOM are normal. Pupils are equal, round, and reactive to light.   Neck: Normal range of motion. Neck supple.   Cardiovascular: Normal rate, regular rhythm and normal heart sounds.    Pulmonary/Chest: Effort normal and breath sounds normal.   Abdominal: Soft. Bowel sounds are normal. He exhibits no distension.   Psych: Appears slightly confused during conversation but AAOx3    Right Side Rheumatological Exam     Examination finds the shoulder, elbow, wrist, knee, 1st PIP, 1st MCP, 2nd PIP, 2nd MCP, 3rd PIP, 3rd MCP, 4th PIP, 4th MCP, 5th PIP and 5th MCP normal.    Shoulder Exam   Sensation: normal    Knee Exam   Sensation: normal    Hip Exam   Sensation: normal    Elbow/Wrist Exam   Sensation: normal    Muscle Strength (0-5 scale):  Neck Flexion:  5  Deltoid:  5  Biceps: 5/5   Triceps:  5  : 5/5   Iliopsoas: 5  Quadriceps:  5   Distal Lower Extremity: 5    Left Side Rheumatological Exam     Examination finds the shoulder, elbow, wrist, knee, 1st PIP, 1st MCP, 2nd PIP, 2nd MCP, 3rd PIP, 3rd MCP, 4th PIP, 4th MCP, 5th PIP and 5th MCP normal.    Shoulder Exam    Sensation: normal    Knee Exam   Sensation: normal    Hip Exam   Sensation: normal    Elbow/Wrist Exam   Sensation: normal    Muscle Strength (0-5 scale):  Neck Flexion:  5  Deltoid:  5  Biceps: 5/5   Triceps:  5  :  5/5   Iliopsoas: 5  Quadriceps:  5   Distal Lower Extremity: 5      Lymphadenopathy:     He has no cervical adenopathy.   Neurological: He is alert and oriented to person, place, and time.   Reflex Scores:       Tricep reflexes are 2+ on the right side and 2+ on the left side.       Bicep reflexes are 2+ on the right side and 2+ on the left side.       Brachioradialis reflexes are 2+ on the right side and 2+ on the left side.       Patellar reflexes are 2+ on the right side and 2+ on the left side.       Achilles reflexes are 2+ on the right side and 2+ on the left side.  Skin: Skin is warm and dry.     Musculoskeletal: Normal range of motion. He exhibits no edema, tenderness or deformity.         Significant Labs:  Results for JEREMIAS LUCAS (MRN [id number]) as of 9/15/2017 13:26   Ref. Range 9/15/2017 04:24   WBC Latest Ref Range: 3.90 - 12.70 K/uL 7.06   RBC Latest Ref Range: 4.60 - 6.20 M/uL 4.15 (L)   Hemoglobin Latest Ref Range: 14.0 - 18.0 g/dL 11.5 (L)   Hematocrit Latest Ref Range: 40.0 - 54.0 % 31.8 (L)   MCV Latest Ref Range: 82 - 98 fL 77 (L)   MCH Latest Ref Range: 27.0 - 31.0 pg 27.7   MCHC Latest Ref Range: 32.0 - 36.0 g/dL 36.2 (H)   RDW Latest Ref Range: 11.5 - 14.5 % 15.5 (H)   Platelets Latest Ref Range: 150 - 350 K/uL 176   MPV Latest Ref Range: 9.2 - 12.9 fL 9.2   Gran% Latest Ref Range: 38.0 - 73.0 % 67.9   Gran # Latest Ref Range: 1.8 - 7.7 K/uL 4.8   Lymph% Latest Ref Range: 18.0 - 48.0 % 22.8   Lymph # Latest Ref Range: 1.0 - 4.8 K/uL 1.6   Mono% Latest Ref Range: 4.0 - 15.0 % 7.8   Mono # Latest Ref Range: 0.3 - 1.0 K/uL 0.6   Eosinophil% Latest Ref Range: 0.0 - 8.0 % 0.7   Eos # Latest Ref Range: 0.0 - 0.5 K/uL 0.1   Basophil% Latest Ref Range: 0.0 - 1.9 % 0.4   Baso #  Latest Ref Range: 0.00 - 0.20 K/uL 0.03   Protime Latest Ref Range: 9.0 - 12.5 sec 10.8   Coumadin Monitoring INR Latest Ref Range: 0.8 - 1.2  1.0   aPTT Latest Ref Range: 21.0 - 32.0 sec 22.2   Results for JEREMIAS LUCAS (MRN [id number]) as of 9/15/2017 13:26   Ref. Range 9/15/2017 04:24 9/15/2017 08:19   Sodium Latest Ref Range: 136 - 145 mmol/L 138    Potassium Latest Ref Range: 3.5 - 5.1 mmol/L 2.8 (L)    Chloride Latest Ref Range: 95 - 110 mmol/L 101    CO2 Latest Ref Range: 23 - 29 mmol/L 26    Anion Gap Latest Ref Range: 8 - 16 mmol/L 11    BUN, Bld Latest Ref Range: 6 - 20 mg/dL 13    Creatinine Latest Ref Range: 0.5 - 1.4 mg/dL 0.8    eGFR if non African American Latest Ref Range: >60 mL/min/1.73 m^2 >60.0    eGFR if African American Latest Ref Range: >60 mL/min/1.73 m^2 >60.0    Glucose Latest Ref Range: 70 - 110 mg/dL 253 (H)    Calcium Latest Ref Range: 8.7 - 10.5 mg/dL 8.4 (L)    Phosphorus Latest Ref Range: 2.7 - 4.5 mg/dL 3.1    Magnesium Latest Ref Range: 1.6 - 2.6 mg/dL 1.4 (L)    Alkaline Phosphatase Latest Ref Range: 55 - 135 U/L 70    Total Protein Latest Ref Range: 6.0 - 8.4 g/dL 6.2    Albumin Latest Ref Range: 3.5 - 5.2 g/dL 3.1 (L)    Total Bilirubin Latest Ref Range: 0.1 - 1.0 mg/dL 0.8    AST Latest Ref Range: 10 - 40 U/L 23    ALT Latest Ref Range: 10 - 44 U/L 19    Triglycerides Latest Ref Range: 30 - 150 mg/dL  349 (H)   Cholesterol Latest Ref Range: 120 - 199 mg/dL  190   HDL Latest Ref Range: 40 - 75 mg/dL  51   LDL Cholesterol Latest Ref Range: 63.0 - 159.0 mg/dL  69.2   Total Cholesterol/HDL Ratio Latest Ref Range: 2.0 - 5.0   3.7   CPK Latest Ref Range: 20 - 200 U/L 40    CPK MB Latest Ref Range: 0.1 - 6.5 ng/mL 0.8    MB% Latest Ref Range: 0.0 - 5.0 % 2.0    Troponin I Latest Ref Range: 0.000 - 0.026 ng/mL <0.006    Results for JEREMIAS LUCAS (MRN [id number]) as of 9/15/2017 13:26   Ref. Range 9/11/2017 11:35 9/14/2017 18:10 9/15/2017 00:46   Hemoglobin A1C Latest Ref Range: 4.0 - 5.6 %    10.8 (H)   Estimated Avg Glucose Latest Ref Range: 68 - 131 mg/dL   263 (H)   TSH Latest Ref Range: 0.400 - 4.000 uIU/mL 1.799     Results for JEREMIAS LUCAS (MRN [id number]) as of 9/15/2017 13:26   Ref. Range 4/29/2015 09:05 5/13/2017 16:47 5/25/2017 08:16   Anti-SSA Antibody Latest Ref Range: 0.00 - 19.99 EU   0.59   Anti-SSA Interpretation Latest Ref Range: Negative    Negative   Anti-SSB Antibody Latest Ref Range: 0.00 - 19.99 EU   0.28   Anti-SSB Interpretation Latest Ref Range: Negative    Negative   ds DNA Ab Latest Ref Range: Negative 1:10    Negative 1:10   Smooth Muscle Ab Latest Ref Range: Negative  Negative 1:40     Anti-Mitochon Ab IFA Latest Ref Range: Negative  Negative 1:40       Results for JEREMIAS LUCAS (MRN [id number]) as of 9/15/2017 13:26   Ref. Range 5/26/2017 05:56   Cytoplasmic Neutrophilic Ab Latest Ref Range: <1:20 Titer <1:20   Perinuclear (P-ANCA) Latest Ref Range: <1:20 Titer <1:20   Results for JEREMIAS LUCAS (MRN [id number]) as of 9/15/2017 13:26   Ref. Range 5/26/2017 11:38 6/2/2017 16:50   Protein, Serum Latest Ref Range: 6.0 - 8.4 g/dL 4.6 (L)    Albumin grams/dl Latest Ref Range: 3.35 - 5.55 g/dL 2.62 (L)    Alpha-1 grams/dl Latest Ref Range: 0.17 - 0.41 g/dL 0.23    Alpha-2 grams/dl Latest Ref Range: 0.43 - 0.99 g/dL 0.62    Beta grams/dl Latest Ref Range: 0.50 - 1.10 g/dL 0.69    Gamma grams/dl Latest Ref Range: 0.67 - 1.58 g/dL 0.43 (L)    Pathologist Interpretation SPE Unknown REVIEWED    Cryoglobulin, Qualitative Latest Ref Range: Absent   Absent     Results for JEREMIAS LUCAS (MRN [id number]) as of 9/15/2017 13:26   Ref. Range 5/17/2017 14:03 5/25/2017 08:16 5/25/2017 14:24 5/26/2017 05:56   Hep A IgM Unknown    Negative   Hep B C IgM Unknown    Negative   Hepatitis B Surface Ag Unknown    Negative   Hepatitis C Ab Unknown    Negative   West Nile Virus, PCR Latest Ref Range: Negative    Negative    HIV 1/2 Ag/Ab Latest Ref Range: Negative   Negative     RPR Latest Ref Range:  Non-reactive  Non-reactive      Lyme Ab Latest Ref Range: <0.90 Index Value  0.03     Results for JEREMIAS LUCAS (MRN [id number]) as of 9/15/2017 13:26   Ref. Range 9/15/2017 04:34   Specimen UA Unknown Urine, Clean Catch   Color, UA Latest Ref Range: Yellow, Straw, Beti  Yellow   pH, UA Latest Ref Range: 5.0 - 8.0  6.0   Specific Gravity, UA Latest Ref Range: 1.005 - 1.030  1.015   Appearance, UA Latest Ref Range: Clear  Clear   Protein, UA Latest Ref Range: Negative  1+ (A)   Glucose, UA Latest Ref Range: Negative  3+ (A)   Ketones, UA Latest Ref Range: Negative  Negative   Occult Blood UA Latest Ref Range: Negative  1+ (A)   Nitrite, UA Latest Ref Range: Negative  Negative   Urobilinogen, UA Latest Ref Range: <2.0 EU/dL Negative   Bilirubin (UA) Latest Ref Range: Negative  Negative   Leukocytes, UA Latest Ref Range: Negative  Negative   RBC, UA Latest Ref Range: 0 - 4 /hpf 3   WBC, UA Latest Ref Range: 0 - 5 /hpf 0   Bacteria, UA Latest Ref Range: None-Occ /hpf Rare   Yeast, UA Latest Ref Range: None  None   Squam Epithel, UA Latest Units: /hpf 0   Hyaline Casts, UA Latest Ref Range: 0-1/lpf /lpf 0   Microscopic Comment Unknown SEE COMMENT   Results for JEREMIAS LUCAS (MRN [id number]) as of 9/15/2017 13:26   Ref. Range 5/14/2017 15:14   Color, CSF Latest Ref Range: Colorless  Colorless   Heme Aliquot Latest Units: mL 2.2   Appearance, CSF Latest Ref Range: Clear  Clear   WBC, CSF Latest Ref Range: 0 - 5 /cu mm 4   RBC, CSF Latest Ref Range: 0 /cu mm 9 (A)   Lymphs, CSF Latest Ref Range: 40 - 80 % 83 (H)   Mono/Macrophage, CSF Latest Ref Range: 15 - 45 % 17   CSF Bands Latest Units: bands 0   Serum Bands Latest Units: bands 0   Olig Bands Interpretation, CSF Latest Ref Range: <4 bands 0   IgG Index, CSF Latest Ref Range: <=0.85  0.57   Albumin, CSF Latest Ref Range: <=27.0 mg/dL 51.4 (H)   IGG/ALBUMIN RATIO, CSF Latest Ref Range: <=0.21  0.13   IgG Synthetic Rate Latest Ref Range: <=12 mg/24 h 5.49   Albumin, Serum  Latest Ref Range: 3200 - 4800 mg/dL 3820   IgG, CSF Latest Ref Range: <=8.1 mg/dL 6.6             Significant Imaging:  CT Head (8/30/17):  Impression          No evidence of acute infarction, hemorrhage, mass, or hydrocephalus.     Scattered areas of decreased attenuation throughout the supratentorial white matter which is nonspecific but likely represents chronic microvascular ischemic changes, not significantly changed when compared to prior exam.     Chest xray (8/30/17)  Impression        Mild amount of opacity in the left lower lung zone which may represent atelectasis or pneumonia.     IR Angio (6/1/17):  Impression       Multifocal areas of stenosis involving the intracranial anterior and posterior circulation most notably in the left A2 segment and the superior cerebellar arteries as detailed above. These have an appearance consistent with cerebral vasculitis.  However, multifocal atherosclerotic disease can have a very similar appearance.  Repeat angiography may be useful following a period of treatment if vasculitis is a clinical consideration.          MRI 6/2/17:  Impression         Multifocal areas of intracranial arterial vascular narrowing as described above. Subtle wall thickening with mild postcontrast enhancement favor vasculitis over atherosclerosis. RCVS and dissection essentially excluded. Clinical correlation, with laboratory and CSF analysis suggested.       MRI 9/14/17:    Impression       1. Findings compatible with a small acute lacunar infarct adjacent to the left frontal horn.  Nonspecific enhancement just inferior to this region and extending into the left basal ganglia, as well as within the inferior aspect of the left cerebellum.  No evidence of a focal mass.  2.  Extensive increased signal intensity involving the periventricular white matter compatible with demyelinating disease versus vasculitis.  3.  Clinical correlation and followup recommended.  4.  This reportedly flattened in  the EPIC and the [facility] medical record system.             Assessment/Plan:     CNS vasculitis    This is a 56 yo M with poorly controlled DM2, HLD, HTN, CVA, CHASE, internuclear ophthalmoplegia of the L eye, who presents with findings of small acute lacunar infarct as well as extensive increased signal intensity involving the periventricular white matter compatible with demyelinating disease versus vasculitis. Symptoms include confusion, drowsiness, and at times dizziness and double vision. Tried cyclophosphamide with some improvement in symptoms but has been unable to receive 3rd dose due to neutropenia. Differentials include primary CNS vasculitis vs MS vs atherosclerosis. Primary CNS vasculitis more likely based on imaging results. Autoimmune work-up being negative also points to diagnosis of Primary CNS vasculitis.     Plan  - given rise in WBC count, can consider giving next dose of Cyclophosphamide at a lower dose. Alternative therapy is Rituximab which can also be considered.   - If starting on Cyclophosphamide, would check WBC count at week 1 to assess betty and adjust.             Thank you for your consult. I will follow-up with patient. Please contact us if you have any additional questions.    Zhao Barth MD  Rheumatology  [facility]    I have personally taken the history and examined the patient and concur with the resident's note as above.  He was previously evaluated by Dr. Durant in our department.  He was readmitted with new neurologic findings.  He had some response to corticosteroids.  He is definitely encephalopathic.  I concur with the diagnosis of primary CNS vasculitis.  He could be continued on cyclophosphamide but in the lower dose to prevent neutropenia.  The other alternative would be rituximab.  There is not much data on using other immunosuppressive treatments.

## 2017-09-15 NOTE — ASSESSMENT & PLAN NOTE
BG goal 140-180; recalculated MDI doses based on 109 kg x 0.6u/kg. Home doses MDI uneven basal / bolus insulin doses given steroid use.   Change Levemir to 30 units AM - first dose now.   Change Novolog to 10 units AC - given high dose Solumedrol pulse x 3 days  Continue moderate dose Novolog correction scale coverage.   BG monitoring ac/hs    -order bedside RN to perform insulin pen training for wife as she has never had DM edu/ insulin instruction.     Discharge planning: given Medicaid limited options - continue Victoza, metformin. Anticipate convert Tresiba to Levemir for insurance coverage. Anticipate adjust Novolog AC dose based on steroid dose.   F/u with CHANCE Steele NP if he wishes.

## 2017-09-15 NOTE — ASSESSMENT & PLAN NOTE
57 y.o. male with significant past medical history of HTN, HLD, DM II, CNS vasculitis-not currently on therapy, discharged in June 2017 for MS vs cardio-embolic vs watershade stroke, recently admitted and discharged for AMS presented to hospital as a transfer from [facility] for evaluation of acute stroke revealed on outpatient MRI imaging.  -Antithrombotics for secondary stroke prevention: Antiplatelets:  Aspirin: 81 mg oral daily  -Statins for secondary stroke prevention and hyperlipidemia, if present: Atorvastatin- 40 mg oral daily  -Aggressive risk factor modification: Hypertension, Diabetes, High Cholesterol and Obesity  -Rehab Efforts: Physical Therapy and Occupational Therapy  -VTE Prophylaxis: Heparin 5000 units SQ every 8 hours

## 2017-09-15 NOTE — SUBJECTIVE & OBJECTIVE
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    Mixed hyperlipidemia 11/9/2012    NAFLD (nonalcoholic fatty liver disease) 10/11/2013    CHASE (nonalcoholic steatohepatitis)     Obesity     Stroke     Type 2 diabetes mellitus with diabetic polyneuropathy, with long-term current use of insulin 5/14/2017     Past Surgical History:   Procedure Laterality Date    SKIN CANCER EXCISION       Family History   Problem Relation Age of Onset    Heart disease Mother     Hypertension Mother     Heart failure Mother     Cancer Father      lung    Diabetes Maternal Aunt      Social History   Substance Use Topics    Smoking status: Never Smoker    Smokeless tobacco: Never Used    Alcohol use No     Review of patient's allergies indicates:  No Known Allergies  Medications: I have reviewed the current medication administration record.    Current Outpatient Prescriptions:     aspirin (ECOTRIN) 81 MG EC tablet, Take 81 mg by mouth once daily., Disp: , Rfl:     atenolol (TENORMIN) 50 MG tablet, Take 1 tablet (50 mg total) by mouth once daily., Disp: 90 tablet, Rfl: 3    atorvastatin (LIPITOR) 40 MG tablet, Take 1 tablet (40 mg total) by mouth once daily., Disp: 90 tablet, Rfl: 3    diltiaZEM (DILACOR XR) 240 MG CDCR, Take 1 capsule (240 mg total) by mouth once daily., Disp: 90 capsule, Rfl: 3    insulin aspart (NOVOLOG) 100 unit/mL InPn pen, [measured glc - 100]/50 = # units sliding scale, Disp: 3 mL, Rfl: 1    insulin needles, disposable, (BD ULTRA-FINE ANA PEN NEEDLES) 32 x 5/32 " Ndle, Inject twice daily, Disp: 100 each, Rfl: 3    levetiracetam (KEPPRA) 500 MG Tab, Take 1 tablet (500 mg total) by mouth 2 (two) times daily., Disp: 60 tablet, Rfl: 2    liraglutide 0.6 mg/0.1 mL, 18 mg/3 mL, subq PNIJ (VICTOZA 3-TOMASZ) 0.6 mg/0.1 mL (18 mg/3 mL) PnIj, Inject 1.8 mg into the skin once daily., Disp: 9 mL, Rfl: 11    metformin " (GLUCOPHAGE-XR) 500 MG 24 hr tablet, Take 2 tablets (1,000 mg total) by mouth 2 (two) times daily with meals., Disp: 360 tablet, Rfl: 0    omega-3 fatty acids-vitamin E (FISH OIL) 1,000 mg Cap, Take 1 capsule by mouth 2 (two) times daily., Disp: , Rfl: 0    ranitidine (ZANTAC) 150 MG tablet, Take 1 tablet (150 mg total) by mouth 2 (two) times daily., Disp: 60 tablet, Rfl: 11    sertraline (ZOLOFT) 100 MG tablet, 1 and half tablet daily (Patient taking differently: Take 150 mg by mouth once daily. ), Disp: 135 tablet, Rfl: 3    valsartan (DIOVAN) 320 MG tablet, Take 1 tablet (320 mg total) by mouth once daily., Disp: 90 tablet, Rfl: 3    vitamin D 1000 units Tab, Take 5 tablets (5,000 Units total) by mouth once daily., Disp: , Rfl:       Review of Systems   Constitutional: Positive for fatigue. Negative for chills and fever.   HENT: Negative for drooling and trouble swallowing.    Eyes: Positive for visual disturbance (intermittent double vision). Negative for pain, discharge and itching.   Respiratory: Negative for shortness of breath.    Cardiovascular: Negative for chest pain and palpitations.   Gastrointestinal: Positive for diarrhea. Negative for abdominal pain, nausea and vomiting.   Endocrine: Negative for cold intolerance and heat intolerance.   Genitourinary: Negative for dysuria and hematuria.   Musculoskeletal: Positive for gait problem (reports frequent falls). Negative for neck pain and neck stiffness.   Skin: Negative for rash.   Allergic/Immunologic: Negative for environmental allergies and food allergies.   Neurological: Negative for dizziness, facial asymmetry, speech difficulty, weakness and headaches.   Hematological: Negative for adenopathy. Does not bruise/bleed easily.   Psychiatric/Behavioral: Positive for confusion (intermittent).     Objective:     Vital Signs (Most Recent):  Temp: 98.5 °F (36.9 °C) (09/14/17 2146)  Pulse: 82 (09/14/17 2317)  Resp: 12 (09/14/17 2317)  BP: (!) 176/97  (09/14/17 2317)  SpO2: 95 % (09/14/17 2317)    Vital Signs Range (Last 24H):  Temp:  [97.3 °F (36.3 °C)-98.5 °F (36.9 °C)]   Pulse:  [73-87]   Resp:  [12-19]   BP: (144-188)/(74-98)   SpO2:  [93 %-100 %]     Physical Exam   Constitutional: He is oriented to person, place, and time. He appears well-developed and well-nourished. No distress.   HENT:   Head: Normocephalic and atraumatic.   Right Ear: External ear normal.   Left Ear: External ear normal.   Nose: Nose normal.   Mouth/Throat: Oropharynx is clear and moist. No oropharyngeal exudate.   Eyes: Conjunctivae and EOM are normal. Pupils are equal, round, and reactive to light. Right eye exhibits no discharge. Left eye exhibits no discharge. No scleral icterus.   Neck: Normal range of motion. Neck supple. No thyromegaly present.   Cardiovascular: Normal rate, regular rhythm, normal heart sounds and intact distal pulses.    No murmur heard.  Pulmonary/Chest: Effort normal and breath sounds normal. No respiratory distress. He has no wheezes.   Abdominal: Soft. Bowel sounds are normal. He exhibits no distension. There is no tenderness.   Musculoskeletal: He exhibits no edema.   Lymphadenopathy:     He has no cervical adenopathy.   Neurological: He is alert and oriented to person, place, and time. GCS eye subscore is 4 - spontaneous. GCS verbal subscore is 5 - oriented. GCS motor subscore is 6 - obeys commands.   Skin: Skin is warm and dry. Capillary refill takes less than 2 seconds. He is not diaphoretic.   Psychiatric: He has a normal mood and affect.   Nursing note and vitals reviewed.      Neurological Exam:   LOC: alert and follows requests  Language: No aphasia  Speech: No dysarthria  Orientation: Person, Place, Time  Visual Fields (CN II): Full  EOM (CN III, IV, VI): Full/intact  Pupils (CN III, IV, VI): PERRL  Facial Movement (CN VII): symmetric facial expression  Tongue (CN XII): to midline  Motor*: Arm Left:  Normal (5/5), Leg Left:   Normal (5/5), Arm  Right:   Normal (5/5), Leg Right:   Normal (5/5)  Cerebellar*: no dysmetria  Sensation: intact to light touch, temperature and vibration    NIH Stroke Scale:  Interval: baseline (upon arrival/admit)  Level of Consciousness: 0 - alert  LOC Questions: 0 - answers both correctly  LOC Commands: 0 - performs both correctly  Best Gaze: 0 - normal  Visual: 0 - no visual loss  Facial Palsy: 0 - normal  Motor Left Arm: 0 - no drift  Motor Right Arm: 0 - no drift  Motor Left Le - no drift  Motor Right Le - no drift  Limb Ataxia: 0 - absent  Sensory: 0 - normal  Best Language: 0 - no aphasia  Dysarthria: 0 - normal articulation  Extinction and Inattention: 0 - no neglect  NIH Stroke Scale Total: 0  [place] Coma Scale:  Best Eye Response: 4 - spontaneous  Best Motor Response: 6 - obeys commands  Best Verbal Response: 5 - oriented  [place] Coma Scale Total: 15  Modified Green Scale:   Timeline: Prior to symptoms onset  Modified Gunjan Score: 1 - no significant disability        Laboratory:  CMP:   Recent Labs  Lab 17  1810   CALCIUM 9.7   ALBUMIN 3.7   PROT 7.3      K 2.9*   CO2 25      BUN 19   CREATININE 1.0   ALKPHOS 82   ALT 21   AST 16   BILITOT 0.6     CBC:   Recent Labs  Lab 17  1810   WBC 9.93   RBC 4.33*   HGB 12.1*   HCT 34.3*      MCV 79*   MCH 27.9   MCHC 35.3     Lipid Panel: No results for input(s): CHOL, LDLCALC, HDL, TRIG in the last 168 hours.  Coagulation:   Recent Labs  Lab 17  1810   INR 1.0     Hgb A1C: No results for input(s): HGBA1C in the last 168 hours.  TSH:   Recent Labs  Lab 17  1135   TSH 1.799       Diagnostic Results:  Brain Imaging: MRI Head. Date: 17  Acute Pathology: Infarct  Location: Frontal:  left  Old Vascular Pathology: None  Location: N/A    Cerebrovascular Imaging: none    Cervical Vascular Imaging: none    Cardiac Evaluation: Trans-thoracic. Date: 17  Key Findings: Enlarged left atria  EKG/Telemetry: Sinus rhythm

## 2017-09-15 NOTE — PLAN OF CARE
Problem: Physical Therapy Goal  Goal: Physical Therapy Goal  Goals to be met by: 2017     Patient will increase functional independence with mobility by performin. Supine to sit with Modified [place]  2. Sit to supine with Modified [place]  3. Sit to stand transfer with Supervision  4. Bed to chair transfer with Supervision without AD  5. Gait  x 300 feet with Supervision using Single-point Cane .   6. Lower extremity exercise program x20 reps per handout, with independence    Outcome: Ongoing (interventions implemented as appropriate)  Goals set

## 2017-09-15 NOTE — SUBJECTIVE & OBJECTIVE
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    Mixed hyperlipidemia 11/9/2012    NAFLD (nonalcoholic fatty liver disease) 10/11/2013    CHASE (nonalcoholic steatohepatitis)     Obesity     Stroke     Type 2 diabetes mellitus with diabetic polyneuropathy, with long-term current use of insulin 5/14/2017       Past Surgical History:   Procedure Laterality Date    SKIN CANCER EXCISION         Immunization History   Administered Date(s) Administered    Influenza Split 11/22/2013       Review of patient's allergies indicates:  No Known Allergies  Current Facility-Administered Medications   Medication Frequency    aspirin EC tablet 81 mg Daily    atenolol tablet 50 mg Daily    atorvastatin tablet 40 mg Daily    dextrose 50% injection 12.5 g PRN    dextrose 50% injection 25 g PRN    diltiaZEM 24 hr capsule 240 mg Daily    glucagon (human recombinant) injection 1 mg PRN    glucose chewable tablet 16 g PRN    glucose chewable tablet 24 g PRN    heparin (porcine) injection 5,000 Units Q8H    insulin aspart pen 1-10 Units QID (AC + HS) PRN    insulin aspart pen 10 Units TIDWM    insulin detemir pen 30 Units Once    labetalol injection 10 mg Q6H PRN    levetiracetam tablet 500 mg BID    methylPREDNISolone sodium succinate (SOLU-MEDROL) 1,000 mg in dextrose 5 % 100 mL IVPB Daily    omega-3 fatty acids-fish oil 340-1,000 mg capsule 1 capsule BID    ondansetron disintegrating tablet 8 mg Q8H PRN    ondansetron injection 4 mg Q12H PRN    pantoprazole EC tablet 40 mg Daily    sertraline tablet 150 mg Daily    sodium chloride 0.9% bolus 500 mL Continuous PRN    sodium chloride 0.9% flush 3 mL Q8H    valsartan tablet 320 mg Daily    vitamin D 1000 units tablet 5,000 Units Daily     Family History     Problem Relation (Age of Onset)    Cancer Father    Diabetes Maternal Aunt    Heart disease Mother    Heart failure  Mother    Hypertension Mother        Social History Main Topics    Smoking status: Never Smoker    Smokeless tobacco: Never Used    Alcohol use No    Drug use: No    Sexual activity: Not on file     Review of Systems   Constitutional: Positive for activity change and fatigue. Negative for chills, fever and unexpected weight change.   HENT: Negative for congestion, facial swelling, mouth sores, sinus pressure, sneezing, trouble swallowing and voice change.    Eyes: Negative for photophobia, pain and redness.   Respiratory: Negative for chest tightness and shortness of breath.    Cardiovascular: Negative for chest pain, palpitations and leg swelling.   Gastrointestinal: Negative for abdominal distention, abdominal pain, diarrhea, nausea, rectal pain and vomiting.   Endocrine: Negative for cold intolerance, heat intolerance and polyphagia.   Genitourinary: Negative for difficulty urinating, enuresis and flank pain.        +bowel/bladder incontinence   Musculoskeletal: Negative for arthralgias, gait problem and joint swelling.   Neurological: Positive for dizziness. Negative for tremors, weakness, light-headedness and headaches.        Intermittent dizziness   Psychiatric/Behavioral: Positive for confusion and decreased concentration. Negative for agitation.     Objective:     Vital Signs (Most Recent):  Temp: 98.8 °F (37.1 °C) (09/15/17 0800)  Pulse: 90 (09/15/17 0800)  Resp: 20 (09/15/17 0800)  BP: 124/75 (09/15/17 0800)  SpO2: 98 % (09/15/17 0800)  O2 Device (Oxygen Therapy): room air (09/15/17 0800) Vital Signs (24h Range):  Temp:  [97.3 °F (36.3 °C)-98.8 °F (37.1 °C)] 98.8 °F (37.1 °C)  Pulse:  [73-92] 90  Resp:  [12-20] 20  SpO2:  [93 %-100 %] 98 %  BP: (124-188)/(74-98) 124/75     Weight: 108.9 kg (240 lb) (09/15/17 0230)  Body mass index is 33.95 kg/m².  Body surface area is 2.33 meters squared.    No intake or output data in the 24 hours ending 09/15/17 1347    Physical Exam   Constitutional: He is  oriented to person, place, and time and well-developed, well-nourished, and in no distress.   HENT:   Head: Normocephalic.   Mouth/Throat: No oropharyngeal exudate.   Decrease in pupillary constriction left eye  EOMI   Eyes: EOM are normal. Pupils are equal, round, and reactive to light.   Neck: Normal range of motion. Neck supple.   Cardiovascular: Normal rate, regular rhythm and normal heart sounds.    Pulmonary/Chest: Effort normal and breath sounds normal.   Abdominal: Soft. Bowel sounds are normal. He exhibits no distension.       Right Side Rheumatological Exam     Examination finds the shoulder, elbow, wrist, knee, 1st PIP, 1st MCP, 2nd PIP, 2nd MCP, 3rd PIP, 3rd MCP, 4th PIP, 4th MCP, 5th PIP and 5th MCP normal.    Shoulder Exam   Sensation: normal    Knee Exam   Sensation: normal    Hip Exam   Sensation: normal    Elbow/Wrist Exam   Sensation: normal    Muscle Strength (0-5 scale):  Neck Flexion:  5  Deltoid:  5  Biceps: 5/5   Triceps:  5  : 5/5   Iliopsoas: 5  Quadriceps:  5   Distal Lower Extremity: 5    Left Side Rheumatological Exam     Examination finds the shoulder, elbow, wrist, knee, 1st PIP, 1st MCP, 2nd PIP, 2nd MCP, 3rd PIP, 3rd MCP, 4th PIP, 4th MCP, 5th PIP and 5th MCP normal.    Shoulder Exam   Sensation: normal    Knee Exam   Sensation: normal    Hip Exam   Sensation: normal    Elbow/Wrist Exam   Sensation: normal    Muscle Strength (0-5 scale):  Neck Flexion:  5  Deltoid:  5  Biceps: 5/5   Triceps:  5  :  5/5   Iliopsoas: 5  Quadriceps:  5   Distal Lower Extremity: 5      Lymphadenopathy:     He has no cervical adenopathy.   Neurological: He is alert and oriented to person, place, and time.   Reflex Scores:       Tricep reflexes are 2+ on the right side and 2+ on the left side.       Bicep reflexes are 2+ on the right side and 2+ on the left side.       Brachioradialis reflexes are 2+ on the right side and 2+ on the left side.       Patellar reflexes are 2+ on the right side and 2+  on the left side.       Achilles reflexes are 2+ on the right side and 2+ on the left side.  Skin: Skin is warm and dry.     Musculoskeletal: Normal range of motion. He exhibits no edema, tenderness or deformity.         Significant Labs:  Results for JEREMIAS LUCAS (MRN [id number]) as of 9/15/2017 13:26   Ref. Range 9/15/2017 04:24   WBC Latest Ref Range: 3.90 - 12.70 K/uL 7.06   RBC Latest Ref Range: 4.60 - 6.20 M/uL 4.15 (L)   Hemoglobin Latest Ref Range: 14.0 - 18.0 g/dL 11.5 (L)   Hematocrit Latest Ref Range: 40.0 - 54.0 % 31.8 (L)   MCV Latest Ref Range: 82 - 98 fL 77 (L)   MCH Latest Ref Range: 27.0 - 31.0 pg 27.7   MCHC Latest Ref Range: 32.0 - 36.0 g/dL 36.2 (H)   RDW Latest Ref Range: 11.5 - 14.5 % 15.5 (H)   Platelets Latest Ref Range: 150 - 350 K/uL 176   MPV Latest Ref Range: 9.2 - 12.9 fL 9.2   Gran% Latest Ref Range: 38.0 - 73.0 % 67.9   Gran # Latest Ref Range: 1.8 - 7.7 K/uL 4.8   Lymph% Latest Ref Range: 18.0 - 48.0 % 22.8   Lymph # Latest Ref Range: 1.0 - 4.8 K/uL 1.6   Mono% Latest Ref Range: 4.0 - 15.0 % 7.8   Mono # Latest Ref Range: 0.3 - 1.0 K/uL 0.6   Eosinophil% Latest Ref Range: 0.0 - 8.0 % 0.7   Eos # Latest Ref Range: 0.0 - 0.5 K/uL 0.1   Basophil% Latest Ref Range: 0.0 - 1.9 % 0.4   Baso # Latest Ref Range: 0.00 - 0.20 K/uL 0.03   Protime Latest Ref Range: 9.0 - 12.5 sec 10.8   Coumadin Monitoring INR Latest Ref Range: 0.8 - 1.2  1.0   aPTT Latest Ref Range: 21.0 - 32.0 sec 22.2   Results for JEREMIAS LUCAS (MRN [id number]) as of 9/15/2017 13:26   Ref. Range 9/15/2017 04:24 9/15/2017 08:19   Sodium Latest Ref Range: 136 - 145 mmol/L 138    Potassium Latest Ref Range: 3.5 - 5.1 mmol/L 2.8 (L)    Chloride Latest Ref Range: 95 - 110 mmol/L 101    CO2 Latest Ref Range: 23 - 29 mmol/L 26    Anion Gap Latest Ref Range: 8 - 16 mmol/L 11    BUN, Bld Latest Ref Range: 6 - 20 mg/dL 13    Creatinine Latest Ref Range: 0.5 - 1.4 mg/dL 0.8    eGFR if non African American Latest Ref Range: >60 mL/min/1.73  m^2 >60.0    eGFR if African American Latest Ref Range: >60 mL/min/1.73 m^2 >60.0    Glucose Latest Ref Range: 70 - 110 mg/dL 253 (H)    Calcium Latest Ref Range: 8.7 - 10.5 mg/dL 8.4 (L)    Phosphorus Latest Ref Range: 2.7 - 4.5 mg/dL 3.1    Magnesium Latest Ref Range: 1.6 - 2.6 mg/dL 1.4 (L)    Alkaline Phosphatase Latest Ref Range: 55 - 135 U/L 70    Total Protein Latest Ref Range: 6.0 - 8.4 g/dL 6.2    Albumin Latest Ref Range: 3.5 - 5.2 g/dL 3.1 (L)    Total Bilirubin Latest Ref Range: 0.1 - 1.0 mg/dL 0.8    AST Latest Ref Range: 10 - 40 U/L 23    ALT Latest Ref Range: 10 - 44 U/L 19    Triglycerides Latest Ref Range: 30 - 150 mg/dL  349 (H)   Cholesterol Latest Ref Range: 120 - 199 mg/dL  190   HDL Latest Ref Range: 40 - 75 mg/dL  51   LDL Cholesterol Latest Ref Range: 63.0 - 159.0 mg/dL  69.2   Total Cholesterol/HDL Ratio Latest Ref Range: 2.0 - 5.0   3.7   CPK Latest Ref Range: 20 - 200 U/L 40    CPK MB Latest Ref Range: 0.1 - 6.5 ng/mL 0.8    MB% Latest Ref Range: 0.0 - 5.0 % 2.0    Troponin I Latest Ref Range: 0.000 - 0.026 ng/mL <0.006    Results for JEREMIAS LUCAS (MRN [id number]) as of 9/15/2017 13:26   Ref. Range 9/11/2017 11:35 9/14/2017 18:10 9/15/2017 00:46   Hemoglobin A1C Latest Ref Range: 4.0 - 5.6 %   10.8 (H)   Estimated Avg Glucose Latest Ref Range: 68 - 131 mg/dL   263 (H)   TSH Latest Ref Range: 0.400 - 4.000 uIU/mL 1.799     Results for JEREMIAS LUCAS (MRN [id number]) as of 9/15/2017 13:26   Ref. Range 4/29/2015 09:05 5/13/2017 16:47 5/25/2017 08:16   Anti-SSA Antibody Latest Ref Range: 0.00 - 19.99 EU   0.59   Anti-SSA Interpretation Latest Ref Range: Negative    Negative   Anti-SSB Antibody Latest Ref Range: 0.00 - 19.99 EU   0.28   Anti-SSB Interpretation Latest Ref Range: Negative    Negative   ds DNA Ab Latest Ref Range: Negative 1:10    Negative 1:10   Smooth Muscle Ab Latest Ref Range: Negative  Negative 1:40     Anti-Mitochon Ab IFA Latest Ref Range: Negative  Negative 1:40       Results  for JEREMIAS LUCAS (MRN [id number]) as of 9/15/2017 13:26   Ref. Range 5/26/2017 05:56   Cytoplasmic Neutrophilic Ab Latest Ref Range: <1:20 Titer <1:20   Perinuclear (P-ANCA) Latest Ref Range: <1:20 Titer <1:20   Results for JEREMIAS LUCAS (MRN [id number]) as of 9/15/2017 13:26   Ref. Range 5/26/2017 11:38 6/2/2017 16:50   Protein, Serum Latest Ref Range: 6.0 - 8.4 g/dL 4.6 (L)    Albumin grams/dl Latest Ref Range: 3.35 - 5.55 g/dL 2.62 (L)    Alpha-1 grams/dl Latest Ref Range: 0.17 - 0.41 g/dL 0.23    Alpha-2 grams/dl Latest Ref Range: 0.43 - 0.99 g/dL 0.62    Beta grams/dl Latest Ref Range: 0.50 - 1.10 g/dL 0.69    Gamma grams/dl Latest Ref Range: 0.67 - 1.58 g/dL 0.43 (L)    Pathologist Interpretation SPE Unknown REVIEWED    Cryoglobulin, Qualitative Latest Ref Range: Absent   Absent     Results for JEREMIAS LUCAS (MRN [id number]) as of 9/15/2017 13:26   Ref. Range 5/17/2017 14:03 5/25/2017 08:16 5/25/2017 14:24 5/26/2017 05:56   Hep A IgM Unknown    Negative   Hep B C IgM Unknown    Negative   Hepatitis B Surface Ag Unknown    Negative   Hepatitis C Ab Unknown    Negative   West Nile Virus, PCR Latest Ref Range: Negative    Negative    HIV 1/2 Ag/Ab Latest Ref Range: Negative   Negative     RPR Latest Ref Range: Non-reactive  Non-reactive      Lyme Ab Latest Ref Range: <0.90 Index Value  0.03     Results for JEREMIAS LUCAS (MRN [id number]) as of 9/15/2017 13:26   Ref. Range 9/15/2017 04:34   Specimen UA Unknown Urine, Clean Catch   Color, UA Latest Ref Range: Yellow, Straw, Beti  Yellow   pH, UA Latest Ref Range: 5.0 - 8.0  6.0   Specific Gravity, UA Latest Ref Range: 1.005 - 1.030  1.015   Appearance, UA Latest Ref Range: Clear  Clear   Protein, UA Latest Ref Range: Negative  1+ (A)   Glucose, UA Latest Ref Range: Negative  3+ (A)   Ketones, UA Latest Ref Range: Negative  Negative   Occult Blood UA Latest Ref Range: Negative  1+ (A)   Nitrite, UA Latest Ref Range: Negative  Negative   Urobilinogen, UA Latest Ref Range:  <2.0 EU/dL Negative   Bilirubin (UA) Latest Ref Range: Negative  Negative   Leukocytes, UA Latest Ref Range: Negative  Negative   RBC, UA Latest Ref Range: 0 - 4 /hpf 3   WBC, UA Latest Ref Range: 0 - 5 /hpf 0   Bacteria, UA Latest Ref Range: None-Occ /hpf Rare   Yeast, UA Latest Ref Range: None  None   Squam Epithel, UA Latest Units: /hpf 0   Hyaline Casts, UA Latest Ref Range: 0-1/lpf /lpf 0   Microscopic Comment Unknown SEE COMMENT   Results for JEREMIAS LUCAS (MRN [id number]) as of 9/15/2017 13:26   Ref. Range 5/14/2017 15:14   Color, CSF Latest Ref Range: Colorless  Colorless   Heme Aliquot Latest Units: mL 2.2   Appearance, CSF Latest Ref Range: Clear  Clear   WBC, CSF Latest Ref Range: 0 - 5 /cu mm 4   RBC, CSF Latest Ref Range: 0 /cu mm 9 (A)   Lymphs, CSF Latest Ref Range: 40 - 80 % 83 (H)   Mono/Macrophage, CSF Latest Ref Range: 15 - 45 % 17   CSF Bands Latest Units: bands 0   Serum Bands Latest Units: bands 0   Olig Bands Interpretation, CSF Latest Ref Range: <4 bands 0   IgG Index, CSF Latest Ref Range: <=0.85  0.57   Albumin, CSF Latest Ref Range: <=27.0 mg/dL 51.4 (H)   IGG/ALBUMIN RATIO, CSF Latest Ref Range: <=0.21  0.13   IgG Synthetic Rate Latest Ref Range: <=12 mg/24 h 5.49   Albumin, Serum Latest Ref Range: 3200 - 4800 mg/dL 3820   IgG, CSF Latest Ref Range: <=8.1 mg/dL 6.6             Significant Imaging:  CT Head (8/30/17):  Impression          No evidence of acute infarction, hemorrhage, mass, or hydrocephalus.     Scattered areas of decreased attenuation throughout the supratentorial white matter which is nonspecific but likely represents chronic microvascular ischemic changes, not significantly changed when compared to prior exam.     Chest xray (8/30/17)  Impression        Mild amount of opacity in the left lower lung zone which may represent atelectasis or pneumonia.     IR Angio (6/1/17):  Impression       Multifocal areas of stenosis involving the intracranial anterior and posterior  circulation most notably in the left A2 segment and the superior cerebellar arteries as detailed above. These have an appearance consistent with cerebral vasculitis.  However, multifocal atherosclerotic disease can have a very similar appearance.  Repeat angiography may be useful following a period of treatment if vasculitis is a clinical consideration.          MRI 6/2/17:  Impression         Multifocal areas of intracranial arterial vascular narrowing as described above. Subtle wall thickening with mild postcontrast enhancement favor vasculitis over atherosclerosis. RCVS and dissection essentially excluded. Clinical correlation, with laboratory and CSF analysis suggested.       MRI 9/14/17:    Impression       1. Findings compatible with a small acute lacunar infarct adjacent to the left frontal horn.  Nonspecific enhancement just inferior to this region and extending into the left basal ganglia, as well as within the inferior aspect of the left cerebellum.  No evidence of a focal mass.  2.  Extensive increased signal intensity involving the periventricular white matter compatible with demyelinating disease versus vasculitis.  3.  Clinical correlation and followup recommended.  4.  This reportedly flattened in the EPIC and the corpus callosum medical record system.

## 2017-09-16 LAB
ALBUMIN SERPL BCP-MCNC: 3.1 G/DL
ALP SERPL-CCNC: 73 U/L
ALT SERPL W/O P-5'-P-CCNC: 20 U/L
ANION GAP SERPL CALC-SCNC: 13 MMOL/L
AST SERPL-CCNC: 19 U/L
B-OH-BUTYR BLD STRIP-SCNC: 0.1 MMOL/L
BASOPHILS # BLD AUTO: 0.01 K/UL
BASOPHILS NFR BLD: 0.1 %
BILIRUB SERPL-MCNC: 0.7 MG/DL
BUN SERPL-MCNC: 23 MG/DL
BUN SERPL-MCNC: 24 MG/DL
BUN SERPL-MCNC: 25 MG/DL
CALCIUM SERPL-MCNC: 8.9 MG/DL
CALCIUM SERPL-MCNC: 8.9 MG/DL
CALCIUM SERPL-MCNC: 9.1 MG/DL
CHLORIDE SERPL-SCNC: 104 MMOL/L
CHLORIDE SERPL-SCNC: 105 MMOL/L
CHLORIDE SERPL-SCNC: 106 MMOL/L
CO2 SERPL-SCNC: 19 MMOL/L
CO2 SERPL-SCNC: 20 MMOL/L
CO2 SERPL-SCNC: 21 MMOL/L
CREAT SERPL-MCNC: 1.1 MG/DL
CREAT SERPL-MCNC: 1.2 MG/DL
CREAT SERPL-MCNC: 1.2 MG/DL
DIFFERENTIAL METHOD: ABNORMAL
EOSINOPHIL # BLD AUTO: 0 K/UL
EOSINOPHIL NFR BLD: 0 %
ERYTHROCYTE [DISTWIDTH] IN BLOOD BY AUTOMATED COUNT: 15.3 %
EST. GFR  (AFRICAN AMERICAN): >60 ML/MIN/1.73 M^2
EST. GFR  (NON AFRICAN AMERICAN): >60 ML/MIN/1.73 M^2
GLUCOSE SERPL-MCNC: 342 MG/DL
GLUCOSE SERPL-MCNC: 402 MG/DL
GLUCOSE SERPL-MCNC: 462 MG/DL
HCT VFR BLD AUTO: 32.7 %
HGB BLD-MCNC: 11.3 G/DL
LYMPHOCYTES # BLD AUTO: 0.4 K/UL
LYMPHOCYTES NFR BLD: 3.9 %
MAGNESIUM SERPL-MCNC: 1.9 MG/DL
MCH RBC QN AUTO: 27.6 PG
MCHC RBC AUTO-ENTMCNC: 34.6 G/DL
MCV RBC AUTO: 80 FL
MONOCYTES # BLD AUTO: 0 K/UL
MONOCYTES NFR BLD: 0.1 %
NEUTROPHILS # BLD AUTO: 10.6 K/UL
NEUTROPHILS NFR BLD: 95.7 %
PHOSPHATE SERPL-MCNC: 3 MG/DL
PLATELET # BLD AUTO: 196 K/UL
PMV BLD AUTO: 9.6 FL
POCT GLUCOSE: 267 MG/DL (ref 70–110)
POCT GLUCOSE: 285 MG/DL (ref 70–110)
POCT GLUCOSE: 301 MG/DL (ref 70–110)
POCT GLUCOSE: 356 MG/DL (ref 70–110)
POCT GLUCOSE: 362 MG/DL (ref 70–110)
POCT GLUCOSE: 387 MG/DL (ref 70–110)
POCT GLUCOSE: 437 MG/DL (ref 70–110)
POCT GLUCOSE: 467 MG/DL (ref 70–110)
POCT GLUCOSE: 471 MG/DL (ref 70–110)
POTASSIUM SERPL-SCNC: 3.6 MMOL/L
POTASSIUM SERPL-SCNC: 4 MMOL/L
POTASSIUM SERPL-SCNC: 4.4 MMOL/L
PROT SERPL-MCNC: 6.1 G/DL
RBC # BLD AUTO: 4.09 M/UL
SODIUM SERPL-SCNC: 138 MMOL/L
WBC # BLD AUTO: 11.02 K/UL

## 2017-09-16 PROCEDURE — 27000221 HC OXYGEN, UP TO 24 HOURS

## 2017-09-16 PROCEDURE — 94660 CPAP INITIATION&MGMT: CPT

## 2017-09-16 PROCEDURE — 97532 *HC OT COG SKL DEV EA 15: CPT

## 2017-09-16 PROCEDURE — 63600175 PHARM REV CODE 636 W HCPCS: Performed by: NURSE PRACTITIONER

## 2017-09-16 PROCEDURE — 83735 ASSAY OF MAGNESIUM: CPT

## 2017-09-16 PROCEDURE — 80048 BASIC METABOLIC PNL TOTAL CA: CPT

## 2017-09-16 PROCEDURE — 20600001 HC STEP DOWN PRIVATE ROOM

## 2017-09-16 PROCEDURE — 97535 SELF CARE MNGMENT TRAINING: CPT

## 2017-09-16 PROCEDURE — 99233 SBSQ HOSP IP/OBS HIGH 50: CPT | Mod: ,,, | Performed by: PSYCHIATRY & NEUROLOGY

## 2017-09-16 PROCEDURE — A4216 STERILE WATER/SALINE, 10 ML: HCPCS | Performed by: NURSE PRACTITIONER

## 2017-09-16 PROCEDURE — 97116 GAIT TRAINING THERAPY: CPT

## 2017-09-16 PROCEDURE — 25000003 PHARM REV CODE 250: Performed by: PHYSICIAN ASSISTANT

## 2017-09-16 PROCEDURE — 94761 N-INVAS EAR/PLS OXIMETRY MLT: CPT

## 2017-09-16 PROCEDURE — 80053 COMPREHEN METABOLIC PANEL: CPT

## 2017-09-16 PROCEDURE — 36415 COLL VENOUS BLD VENIPUNCTURE: CPT

## 2017-09-16 PROCEDURE — 99900035 HC TECH TIME PER 15 MIN (STAT)

## 2017-09-16 PROCEDURE — 25000003 PHARM REV CODE 250: Performed by: NURSE PRACTITIONER

## 2017-09-16 PROCEDURE — 82010 KETONE BODYS QUAN: CPT

## 2017-09-16 PROCEDURE — 85025 COMPLETE CBC W/AUTO DIFF WBC: CPT

## 2017-09-16 PROCEDURE — 63600175 PHARM REV CODE 636 W HCPCS: Performed by: PHYSICIAN ASSISTANT

## 2017-09-16 PROCEDURE — 84100 ASSAY OF PHOSPHORUS: CPT

## 2017-09-16 PROCEDURE — 99232 SBSQ HOSP IP/OBS MODERATE 35: CPT | Mod: ,,, | Performed by: NURSE PRACTITIONER

## 2017-09-16 RX ORDER — INSULIN ASPART 100 [IU]/ML
20 INJECTION, SOLUTION INTRAVENOUS; SUBCUTANEOUS
Status: DISCONTINUED | OUTPATIENT
Start: 2017-09-16 | End: 2017-09-16

## 2017-09-16 RX ORDER — POTASSIUM CHLORIDE 7.45 MG/ML
10 INJECTION INTRAVENOUS
Status: DISCONTINUED | OUTPATIENT
Start: 2017-09-16 | End: 2017-09-16

## 2017-09-16 RX ORDER — INSULIN ASPART 100 [IU]/ML
12 INJECTION, SOLUTION INTRAVENOUS; SUBCUTANEOUS
Status: DISCONTINUED | OUTPATIENT
Start: 2017-09-16 | End: 2017-09-16

## 2017-09-16 RX ORDER — SODIUM CHLORIDE 9 MG/ML
INJECTION, SOLUTION INTRAVENOUS CONTINUOUS
Status: DISCONTINUED | OUTPATIENT
Start: 2017-09-16 | End: 2017-09-19 | Stop reason: HOSPADM

## 2017-09-16 RX ORDER — INSULIN ASPART 100 [IU]/ML
30 INJECTION, SOLUTION INTRAVENOUS; SUBCUTANEOUS ONCE
Status: COMPLETED | OUTPATIENT
Start: 2017-09-16 | End: 2017-09-16

## 2017-09-16 RX ADMIN — INSULIN ASPART 10 UNITS: 100 INJECTION, SOLUTION INTRAVENOUS; SUBCUTANEOUS at 02:09

## 2017-09-16 RX ADMIN — LEVETIRACETAM 500 MG: 500 TABLET ORAL at 08:09

## 2017-09-16 RX ADMIN — HEPARIN SODIUM 5000 UNITS: 5000 INJECTION, SOLUTION INTRAVENOUS; SUBCUTANEOUS at 05:09

## 2017-09-16 RX ADMIN — ASPIRIN 81 MG: 81 TABLET, COATED ORAL at 08:09

## 2017-09-16 RX ADMIN — METHYLPREDNISOLONE SODIUM SUCCINATE: 1 INJECTION, POWDER, LYOPHILIZED, FOR SOLUTION INTRAMUSCULAR; INTRAVENOUS at 08:09

## 2017-09-16 RX ADMIN — HEPARIN SODIUM 5000 UNITS: 5000 INJECTION, SOLUTION INTRAVENOUS; SUBCUTANEOUS at 09:09

## 2017-09-16 RX ADMIN — Medication 1 CAPSULE: at 08:09

## 2017-09-16 RX ADMIN — SODIUM CHLORIDE 5 UNITS/HR: 9 INJECTION, SOLUTION INTRAVENOUS at 04:09

## 2017-09-16 RX ADMIN — HEPARIN SODIUM 5000 UNITS: 5000 INJECTION, SOLUTION INTRAVENOUS; SUBCUTANEOUS at 02:09

## 2017-09-16 RX ADMIN — INSULIN ASPART 20 UNITS: 100 INJECTION, SOLUTION INTRAVENOUS; SUBCUTANEOUS at 02:09

## 2017-09-16 RX ADMIN — ATENOLOL 50 MG: 50 TABLET ORAL at 08:09

## 2017-09-16 RX ADMIN — Medication 3 ML: at 03:09

## 2017-09-16 RX ADMIN — VITAMIN D, TAB 1000IU (100/BT) 5000 UNITS: 25 TAB at 08:09

## 2017-09-16 RX ADMIN — LEVETIRACETAM 500 MG: 500 TABLET ORAL at 09:09

## 2017-09-16 RX ADMIN — DILTIAZEM HYDROCHLORIDE 240 MG: 240 CAPSULE, EXTENDED RELEASE ORAL at 08:09

## 2017-09-16 RX ADMIN — INSULIN ASPART 30 UNITS: 100 INJECTION, SOLUTION INTRAVENOUS; SUBCUTANEOUS at 02:09

## 2017-09-16 RX ADMIN — INSULIN HUMAN 10 UNITS: 100 INJECTION, SOLUTION PARENTERAL at 02:09

## 2017-09-16 RX ADMIN — VALSARTAN 320 MG: 160 TABLET, FILM COATED ORAL at 08:09

## 2017-09-16 RX ADMIN — PANTOPRAZOLE SODIUM 40 MG: 40 TABLET, DELAYED RELEASE ORAL at 08:09

## 2017-09-16 RX ADMIN — SODIUM CHLORIDE: 0.9 INJECTION, SOLUTION INTRAVENOUS at 09:09

## 2017-09-16 RX ADMIN — INSULIN ASPART 20 UNITS: 100 INJECTION, SOLUTION INTRAVENOUS; SUBCUTANEOUS at 08:09

## 2017-09-16 RX ADMIN — ATORVASTATIN CALCIUM 40 MG: 20 TABLET, FILM COATED ORAL at 08:09

## 2017-09-16 RX ADMIN — INSULIN ASPART 10 UNITS: 100 INJECTION, SOLUTION INTRAVENOUS; SUBCUTANEOUS at 08:09

## 2017-09-16 RX ADMIN — POTASSIUM CHLORIDE 10 MEQ: 10 INJECTION, SOLUTION INTRAVENOUS at 04:09

## 2017-09-16 RX ADMIN — SERTRALINE HYDROCHLORIDE 150 MG: 100 TABLET ORAL at 08:09

## 2017-09-16 RX ADMIN — Medication 1 CAPSULE: at 09:09

## 2017-09-16 RX ADMIN — SODIUM CHLORIDE: 0.9 INJECTION, SOLUTION INTRAVENOUS at 02:09

## 2017-09-16 RX ADMIN — SODIUM CHLORIDE 500 ML: 0.9 INJECTION, SOLUTION INTRAVENOUS at 02:09

## 2017-09-16 NOTE — PT/OT/SLP PROGRESS
Physical Therapy  Treatment    Bessy Nunez   MRN: [id number]   Admitting Diagnosis: Lacunar stroke of left subthalamic region    PT Received On: 09/16/17  PT Start Time: 0843     PT Stop Time: 0908    PT Total Time (min): 25 min       Billable Minutes:  Gait [id number]    Treatment Type: Treatment  PT/PTA: PT             General Precautions: Standard, aspiration, fall, seizure  Orthopedic Precautions: N/A   Braces:           Subjective:  Communicated with nursing prior to session.      Pain/Comfort  Pain Rating 1: 0/10  Pain Rating Post-Intervention 1: 0/10    Objective:   Patient found with: peripheral IV, telemetry    Functional Mobility:  Bed Mobility:   Rolling/Turning to Left: Stand by assistance  Scooting/Bridging: Stand by Assistance  Supine to Sit: Stand by Assistance    Transfers:  Sit <> Stand Assistance: Stand By Assistance  Sit <> Stand Assistive Device: Straight Cane  Bed <> Chair Technique: Stand Pivot  Bed <> Chair Assistance: Stand By Assistance  Bed <> Chair Assistive Device: Straight Cane    Gait:   Gait Distance: >450' with LOB x1 requiring only CGA to regain  Assistance 1: Stand by Assistance, Contact Guard Assistance  Gait Assistive Device: Single point cane  Gait Pattern: 3-point gait  Gait Deviation(s): decreased justin (limited use of SC)    Stairs:  Pt ascended/descend 5 stair(s) x2 trials with Straight Cane with left and right handrails with Contact Guard Assistance.     Balance:   Static Sit: FAIR+: Able to take MINIMAL challenges from all directions  Dynamic Sit: FAIR+: Maintains balance through MINIMAL excursions of active trunk motion  Static Stand: FAIR+: Takes MINIMAL challenges from all directions  Dynamic stand: FAIR: Needs CONTACT GUARD during gait     Therapeutic Activities and Exercises:  Pt. educated on proper use of cane and pt. progress.     AM-PAC 6 CLICK MOBILITY  How much help from another person does this patient currently need?   1 = Unable, Total/Dependent Assistance  2  = A lot, Maximum/Moderate Assistance  3 = A little, Minimum/Contact Guard/Supervision  4 = None, Modified [place]/Independent    Turning over in bed (including adjusting bedclothes, sheets and blankets)?: 4  Sitting down on and standing up from a chair with arms (e.g., wheelchair, bedside commode, etc.): 4  Moving from lying on back to sitting on the side of the bed?: 4  Moving to and from a bed to a chair (including a wheelchair)?: 4  Need to walk in hospital room?: 3  Climbing 3-5 steps with a railing?: 3  Total Score: 22    AM-PAC Raw Score CMS G-Code Modifier Level of Impairment Assistance   6 % Total / Unable   7 - 9 CM 80 - 100% Maximal Assist   10 - 14 CL 60 - 80% Moderate Assist   15 - 19 CK 40 - 60% Moderate Assist   20 - 22 CJ 20 - 40% Minimal Assist   23 CI 1-20% SBA / CGA   24 CH 0% Independent/ Mod I     Patient left up in chair with all lines intact and call button in reach.    Assessment:  Bessy Nunez is a 57 y.o. male with a medical diagnosis of Lacunar stroke of left subthalamic region and presents with increased gait distance and practiced with cane and attempted stairs. Pt. cooperative and tolerated treatment well. Pt. progressing with mobility. Pt. would benefit from continued PT to address functional deficits.    Rehab identified problem list/impairments: Rehab identified problem list/impairments: weakness, impaired endurance, impaired self care skills, impaired functional mobilty, gait instability, impaired balance, impaired cognition, decreased coordination, decreased safety awareness    Rehab potential is good.    Activity tolerance: Good    Discharge recommendations: Discharge Facility/Level Of Care Needs: home health PT     Barriers to discharge: Barriers to Discharge: None    Equipment recommendations: Equipment Needed After Discharge: cane, straight     GOALS:    Physical Therapy Goals        Problem: Physical Therapy Goal    Goal Priority Disciplines Outcome Goal  Variances Interventions   Physical Therapy Goal     PT/OT, PT Ongoing (interventions implemented as appropriate)     Description:  Goals to be met by: 2017     Patient will increase functional independence with mobility by performin. Supine to sit with Modified [place]  2. Sit to supine with Modified [place]  3. Sit to stand transfer with Supervision  4. Bed to chair transfer with Supervision without AD  5. Gait  x 300 feet with Supervision using Single-point Cane .   6. Lower extremity exercise program x20 reps per handout, with independence                      PLAN:    Patient to be seen 6 x/week  to address the above listed problems via gait training, therapeutic activities, therapeutic exercises, neuromuscular re-education  Plan of Care expires: 10/15/17  Plan of Care reviewed with: patient         Puneet Peter, PT  2017

## 2017-09-16 NOTE — PLAN OF CARE
Problem: Physical Therapy Goal  Goal: Physical Therapy Goal  Goals to be met by: 2017     Patient will increase functional independence with mobility by performin. Supine to sit with Modified [place]  2. Sit to supine with Modified [place]  3. Sit to stand transfer with Supervision  4. Bed to chair transfer with Supervision without AD  5. Gait  x 300 feet with Supervision using Single-point Cane .   6. Lower extremity exercise program x20 reps per handout, with independence     Outcome: Ongoing (interventions implemented as appropriate)  Pt. Progressing towards goals

## 2017-09-16 NOTE — ASSESSMENT & PLAN NOTE
BG goal 140-180; Increase Levemir to 40 units daily, 1st dose this AM, double Novolog dose to 20 units AC. Continue moderate dose Novolog correction scale coverage.   BG monitoring ac/hs    Repeat BMP and BG at lunch, if BG >350, will need IV insulin infusion     Will need decrease in doses on Monday 9/18 as last day of high dose Solumedrol is Sunday     -order bedside RN to perform insulin pen training for wife as she has never had DM edu/ insulin instruction.     Discharge planning: No d/c plans currently.  given Medicaid limited options - continue Victoza, metformin. Anticipate convert Tresiba to Levemir for insurance coverage. Anticipate adjust Novolog AC dose based on steroid dose.   F/u with CHANCE Steele NP - needs appt

## 2017-09-16 NOTE — PT/OT/SLP PROGRESS
"Occupational Therapy  Treatment    Bessy Nunez   MRN: [id number]   Admitting Diagnosis: Lacunar stroke of left subthalamic region    OT Date of Treatment: 09/16/17   OT Start Time: 1145  OT Stop Time: 1213  OT Total Time (min): 1408 min    Billable Minutes:  Self Care/Home Management 10 and Cognitive Retraining 18    General Precautions: Standard, aspiration, fall, seizure  Orthopedic Precautions: N/A  Braces: N/A    Do you have any cultural, spiritual, Spiritism conflicts, given your current situation?: [place]    Subjective:  Communicated with nurse prior to session.  Patient:  "I can't remember them all."  Pain/Comfort  Pain Rating 1: 0/10  Pain Rating Post-Intervention 1: 0/10    Objective:  Patient found with: peripheral IV, telemetry   Family not present.    Functional Mobility:  Bed Mobility:  Rolling/Turning to Left: Supervision  Rolling/Turning Right: Supervision  Scooting/Bridging: Supervision  Supine to Sit: Supervision  Sit to Supine: Supervision    Transfers:   Sit <> Stand Assistance: Stand By Assistance  Sit <> Stand Assistive Device: No Assistive Device  Bed <> Chair Technique: Stand Pivot  Bed <> Chair Transfer Assistance: Stand By Assistance    Activities of Daily Living:  Feeding Level of Assistance: Stand by assistance  Grooming Position: Standing  Grooming Level of Assistance: Stand by assistance            Additional Treatment:   Patient instructed on need to call for assistance for toileting needs and when getting up.  Continued education, patient/ family training recommended. Patient alert and oriented x 3; able to follow 4/4 one step commands.  Patient attentive and interactive throughout the session.    Patient's functional status and disposition recommendation discussed with patient.  White board updated in patient's room.  OT asked if there were any other questions; patient had no further questions.    MOCA: 15/30  Visuospatial/ Executive:  1/5  Naming:  3/3  Attention:  3/6  Language:  " "1/3  Abstraction: 0/2  Delayed Recall:  2/5  Orientation:  5/6    AM-PAC 6 CLICK ADL   How much help from another person does this patient currently need?   1 = Unable, Total/Dependent Assistance  2 = A lot, Maximum/Moderate Assistance  3 = A little, Minimum/Contact Guard/Supervision  4 = None, Modified [place]/Independent    Putting on and taking off regular lower body clothing? : 3  Bathing (including washing, rinsing, drying)?: 3  Toileting, which includes using toilet, bedpan, or urinal? : 3  Putting on and taking off regular upper body clothing?: 3  Taking care of personal grooming such as brushing teeth?: 3  Eating meals?: 3  Total Score: 18     AM-PAC Raw Score CMS "G-Code Modifier Level of Impairment Assistance   6 % Total / Unable   7 - 8 CM 80 - 100% Maximal Assist   9-13 CL 60 - 80% Moderate Assist   14 - 19 CK 40 - 60% Moderate Assist   20 - 22 CJ 20 - 40% Minimal Assist   23 CI 1-20% SBA / CGA   24 CH 0% Independent/ Mod I       Patient left up in chair with all lines intact and call button in reach    Assessment:  Bessy Nunez is a 57 y.o. male with a medical diagnosis of left basal ganglia stroke and presents with performance deficits of physical skills including impaired balance, mobility, and endurance; demonstrating performance deficits of cognitive skills including impaired  attention, perception, understanding, problem solving, sequencing and memory all resulting in inability organizing occupational performance in a timely and safe manner.  These performance deficits have resulted in activity limitations including but not limited to:  bed mobility, transfers, ascending/ descending stairs, walking short and long distances, walking around obstacles, transitional movement patterns (kneeling, bending); eating, upper body dressing, lower body dressing, brushing teeth, toileting, bathing, and carrying objects.   Patient's role as , father and independent caretaker for self has been " affected. Patient will benefit from skilled OT services to maximize level of independence with self-care skills and functional mobility.  Will benefit from HH.    Rehab identified problem list/impairments: Rehab identified problem list/impairments: weakness, impaired self care skills, impaired balance, decreased coordination, decreased safety awareness, impaired endurance, gait instability, impaired functional mobilty, impaired cognition, impaired coordination    Rehab potential is good.    Activity tolerance: Good    Discharge recommendations: Discharge Facility/Level Of Care Needs: home health OT     Barriers to discharge: Barriers to Discharge: None    Equipment recommendations: 3-in-1 commode, bath bench     GOALS:    Occupational Therapy Goals        Problem: Occupational Therapy Goal    Goal Priority Disciplines Outcome Interventions   Occupational Therapy Goal     OT, PT/OT     Description:  Goals set 9/15 to be addressed for 7 days with expiration date, 9/22:  Patient will increase functional independence with ADLs by performing:    Patient will demonstrate rolling to the right with modified independence.  Not met   Patient will demonstrate rolling to the left with modified independence.   Not met  Patient will demonstrate supine -sit with modified independence.   Not met  Patient will demonstrate stand pivot transfers with modified independence.   Not met  Patient will demonstrate grooming while standing with modified independence.   Not met  Patient will demonstrate upper body dressing with modified independence.   Not met  Patient will demonstrate lower body dressing with modified independence.   Not met  Patient will demonstrate toileting with modified independence.   Not met  Patient's family / caregiver will demonstrate independence and safety with assisting patient with self-care skills and functional mobility.     Not met  Patient and/or patient's family will verbalize understanding of stroke  prevention guidelines, personal risk factors and stroke warning signs via teachback method.  Not met                           Plan:  Patient to be seen 6 x/week to address the above listed problems via self-care/home management, neuromuscular re-education, cognitive retraining, sensory integration, therapeutic activities, therapeutic exercises  Plan of Care expires: 10/13/17  Plan of Care reviewed with: patient         Guillermina Avelar, LOTR  09/16/2017

## 2017-09-16 NOTE — NURSING
"Notified Georgette Leon pt K+ 4.4 this a.m. She states to "let first K rider finish infusing and do not hang the second dose."  "

## 2017-09-16 NOTE — ASSESSMENT & PLAN NOTE
Solumedrol 1000mg IV QD x 3 9/15/17 - 9/18/17  Anticipate high in BG readings; will increase prandial Novolog AC for better coverage.

## 2017-09-16 NOTE — SUBJECTIVE & OBJECTIVE
"Interval HPI:   Overnight events: Plan to receive higher dose of Levemir this AM, significantly elevated BG, likely related to high dose solumedrol (1000 mg x3 days, today is day 2). Per primary team, also considering PLEX starting Monday. Significant prandial excursions. No d/c plans this weekend  Eatin%  Nausea: No  Hypoglycemia and intervention: No  Fever: No  TPN and/or TF: No  If yes, type of TF/TPN and rate:     /81 (BP Location: Right arm, Patient Position: Lying)   Pulse 70   Temp 98 °F (36.7 °C) (Oral)   Resp 15   Ht 5' 10.5" (1.791 m)   Wt 108.9 kg (240 lb)   SpO2 96%   BMI 33.95 kg/m²     Labs Reviewed and Include      Recent Labs  Lab 17  0414   *   CALCIUM 8.9   ALBUMIN 3.1*   PROT 6.1      K 4.4   CO2 20*      BUN 25*   CREATININE 1.2   ALKPHOS 73   ALT 20   AST 19   BILITOT 0.7     Lab Results   Component Value Date    WBC 11.02 2017    HGB 11.3 (L) 2017    HCT 32.7 (L) 2017    MCV 80 (L) 2017     2017       Recent Labs  Lab 17  1135   TSH 1.799     Lab Results   Component Value Date    HGBA1C 10.8 (H) 09/15/2017       Nutritional status:   Body mass index is 33.95 kg/m².  Lab Results   Component Value Date    ALBUMIN 3.1 (L) 2017    ALBUMIN 3.1 (L) 09/15/2017    ALBUMIN 3.7 2017     No results found for: PREALBUMIN    Estimated Creatinine Clearance: 82.6 mL/min (based on SCr of 1.2 mg/dL).    Accu-Checks  Recent Labs      09/15/17   0046  09/15/17   0736  09/15/17   1132  09/15/17   1749  09/15/17   2143  09/15/17   2146  17   0152  17   0359   POCTGLUCOSE  269*  267*  338*  299*  411*  483*  467*  471*       Current Medications and/or Treatments Impacting Glycemic Control  Immunotherapy:  Immunosuppressants     None        Steroids:   Hormones     Start     Stop Route Frequency Ordered    09/15/17 1052  methylPREDNISolone sodium succinate (SOLU-MEDROL) 1,000 mg in dextrose 5 % 100 mL IVPB "      09/18 0859 IV Daily 09/15/17 1053        Pressors:    Autonomic Drugs     None        Hyperglycemia/Diabetes Medications: Antihyperglycemics     Start     Stop Route Frequency Ordered    09/16/17 1130  insulin aspart pen 12 Units      -- SubQ 3 times daily with meals 09/16/17 0737    09/16/17 0900  insulin detemir pen 36 Units      -- SubQ Daily 09/16/17 0737    09/15/17 0103  insulin aspart pen 1-10 Units      -- SubQ Before meals & nightly PRN 09/15/17 0003

## 2017-09-16 NOTE — PLAN OF CARE
Problem: Patient Care Overview  Goal: Plan of Care Review  Outcome: Ongoing (interventions implemented as appropriate)  Pt remained free from falls or injuries this shift. Pt independent in repositioning. No skin breakdown noticed. Pt rested well through the night. CPAP overnight. Blood glucose in 400's all night. Team aware. neurochecks q4hrs. Confused at times to place and sometimes delayed response. Otherwise no neuro deficits. No c/o pain

## 2017-09-16 NOTE — PROGRESS NOTES
Pt received 10u novolog at 2222. Pt accucheck at this time 467. Notified Georgette Leon. Orders to be written

## 2017-09-16 NOTE — PROGRESS NOTES
"[facility]  Endocrinology  Progress Note    Admit Date: 2017     Reason for Consult: Management of T2DM, Hyperglycemia     Diabetes diagnosis year: 3937-2562    Home Diabetes Medications:  Victoza 1.8mg QD, metformin BID, Levemir flextouch 20 units PM, Novolog flextouch 20 units AC  Lab Results   Component Value Date    HGBA1C 10.8 (H) 09/15/2017     How often checking glucose at home? 1-3 x day   BG readings on regimen: 325-350s  Hypoglycemia on the regimen?  No  Missed doses on regimen?  None recently as wife has been assisting with BG monitoring and insulin administration since 2017.     Diabetes Complications include:   Hyperglycemia    Complicating diabetes co morbidities: Glucocorticoid use  and Dementia    HPI: Patient is a 57 y.o. male with a diagnosis of T2DM based on bloodwork. He is managed by CHANCE Steele NP on [facility]. Last seen 3/2017 when converted from Bydureon to Victoza and Levemir to Tresiba. Tresiba is NOT covered by insurance. He is now receiving Medicaid.  Never hospitalized r/t DM.     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 He underwent chemo with steroid therapy since May 2017 which contributed to extreme BG elevations in 300 range. He has been confused, unsteady, frequent falls at home, fatigue "sleeps up to 20 hours a day".  He was recently admitted with AMS. MRI revealed CVA. He was admitted to [facility],  on admit. Started on low dose MDI. Plans for Solumedrol 1000mg IV x 3 days (9/15 - ).  Endocrine consulted for BG mgmt.     Interval HPI:   Overnight events: Plan to receive higher dose of Levemir this AM, significantly elevated BG, likely related to high dose solumedrol (1000 mg x3 days, today is day 2). Per primary team, also considering PLEX starting Monday. Significant prandial excursions. No d/c plans this weekend  Eatin%  Nausea: No  Hypoglycemia and intervention: No  Fever: No  TPN and/or TF: No  If yes, type of TF/TPN and rate: " "    /81 (BP Location: Right arm, Patient Position: Lying)   Pulse 70   Temp 98 °F (36.7 °C) (Oral)   Resp 15   Ht 5' 10.5" (1.791 m)   Wt 108.9 kg (240 lb)   SpO2 96%   BMI 33.95 kg/m²       Labs Reviewed and Include      Recent Labs  Lab 09/16/17  0414   *   CALCIUM 8.9   ALBUMIN 3.1*   PROT 6.1      K 4.4   CO2 20*      BUN 25*   CREATININE 1.2   ALKPHOS 73   ALT 20   AST 19   BILITOT 0.7     Lab Results   Component Value Date    WBC 11.02 09/16/2017    HGB 11.3 (L) 09/16/2017    HCT 32.7 (L) 09/16/2017    MCV 80 (L) 09/16/2017     09/16/2017       Recent Labs  Lab 09/11/17  1135   TSH 1.799     Lab Results   Component Value Date    HGBA1C 10.8 (H) 09/15/2017       Nutritional status:   Body mass index is 33.95 kg/m².  Lab Results   Component Value Date    ALBUMIN 3.1 (L) 09/16/2017    ALBUMIN 3.1 (L) 09/15/2017    ALBUMIN 3.7 09/14/2017     No results found for: PREALBUMIN    Estimated Creatinine Clearance: 82.6 mL/min (based on SCr of 1.2 mg/dL).    Accu-Checks  Recent Labs      09/15/17   0046  09/15/17   0736  09/15/17   1132  09/15/17   1749  09/15/17   2143  09/15/17   2146  09/16/17   0152  09/16/17   0359   POCTGLUCOSE  269*  267*  338*  299*  411*  483*  467*  471*       Current Medications and/or Treatments Impacting Glycemic Control  Immunotherapy:  Immunosuppressants     None        Steroids:   Hormones     Start     Stop Route Frequency Ordered    09/15/17 1052  methylPREDNISolone sodium succinate (SOLU-MEDROL) 1,000 mg in dextrose 5 % 100 mL IVPB      09/18 0859 IV Daily 09/15/17 1053        Pressors:    Autonomic Drugs     None        Hyperglycemia/Diabetes Medications: Antihyperglycemics     Start     Stop Route Frequency Ordered    09/16/17 1130  insulin aspart pen 12 Units      -- SubQ 3 times daily with meals 09/16/17 0737    09/16/17 0900  insulin detemir pen 36 Units      -- SubQ Daily 09/16/17 0737    09/15/17 0103  insulin aspart pen 1-10 Units      -- " SubQ Before meals & nightly PRN 09/15/17 0003          ASSESSMENT and PLAN    * Lacunar stroke of left subthalamic region    Seen on MRI  avoid hypoglycemia        Type 2 diabetes mellitus with hyperglycemia, with long-term current use of insulin    BG goal 140-180; Increase Levemir to 40 units daily, 1st dose this AM, double Novolog dose to 20 units AC. Continue moderate dose Novolog correction scale coverage.   BG monitoring ac/hs    Repeat BMP and BG at lunch, if BG >350, will need IV insulin infusion. Order placed for bedside RN to page endocrine with lunch BG    Will need decrease in doses on Monday 9/18 as last day of high dose Solumedrol is Sunday     -order bedside RN to perform insulin pen training for wife as she has never had DM edu/ insulin instruction.     Discharge planning: No d/c plans currently.  given Medicaid limited options - continue Victoza, metformin. Anticipate convert Tresiba to Levemir for insurance coverage. Anticipate adjust Novolog AC dose based on steroid dose.   F/u with CHANCE Steele NP - needs appt           CNS vasculitis    sees Dr. Love outpatient and was taken off of chemotherapy due to decreased WBC count.    - avoid hypoglycemia          Adverse effect of glucocorticoids and synthetic analogues, initial encounter    Solumedrol 1000mg IV QD x 3 9/15/17 - 9/18/17  Anticipate high in BG readings; will increase prandial Novolog AC for better coverage.           Obesity    Body mass index is 33.95 kg/m².  may contribute to insulin resistance              Corinne Renae NP  Endocrinology  [facility]

## 2017-09-16 NOTE — NURSING
Pt received 10u regular insulin IV at 0242 as well as ordered bolus and starting IV fluids. accucheck 471 at this time. Notified Latasha Leon. No new orders at this time. Awaiting results of beta-hydroxybutyrate

## 2017-09-16 NOTE — NURSING
"Pt accucheck 411. Repeat 483. Notified Robbie shay vascular neurology. Pt was given 16units novolog and 30units levemir at 1750. Pt was started on IV solumedrol today.  Informed her endocrinology consulted and I can page them for recommendations/orders. She states "no need to page them. I will review chart and place orders."  "

## 2017-09-16 NOTE — PLAN OF CARE
Problem: Occupational Therapy Goal  Goal: Occupational Therapy Goal  Goals set 9/15 to be addressed for 7 days with expiration date, 9/22:  Patient will increase functional independence with ADLs by performing:    Patient will demonstrate rolling to the right with modified independence.  Not met   Patient will demonstrate rolling to the left with modified independence.   Not met  Patient will demonstrate supine -sit with modified independence.   Not met  Patient will demonstrate stand pivot transfers with modified independence.   Not met  Patient will demonstrate grooming while standing with modified independence.   Not met  Patient will demonstrate upper body dressing with modified independence.   Not met  Patient will demonstrate lower body dressing with modified independence.   Not met  Patient will demonstrate toileting with modified independence.   Not met  Patient's family / caregiver will demonstrate independence and safety with assisting patient with self-care skills and functional mobility.     Not met  Patient and/or patient's family will verbalize understanding of stroke prevention guidelines, personal risk factors and stroke warning signs via teachback method.  Not met          Goals remain appropriate.  COLTON Cano  9/16/2017

## 2017-09-16 NOTE — PROGRESS NOTES
1420: Called by bedside RN with lunch BG 430s, minimal improvement despite significant increase in Novolog dose. Pt eating lunch, instructed to admin Novolog 30 units + correction scale (total 40 units of Novolog), will review labs shortly    1445: On repeat BMP , anion gap 13, beta hydroxy from this AM 0.1, bicarb 19. Discussed with staff Dr. Faulkner, will change diet to SF clears, change to intensive insulin infusion protocol for aggressive hyperglycemic management, repeat BMP @ 2000.     No d/c plans in near future, pt will complete Solumedrol 1000 mg x3 dose tomorrow (last dose tomorrow) then will start PLEX Monday, so will remain inpatient through week per primary team.   Discussed plan and diet change with primary team.

## 2017-09-17 PROBLEM — E87.6 HYPOKALEMIA: Status: RESOLVED | Noted: 2017-06-04 | Resolved: 2017-09-17

## 2017-09-17 PROBLEM — E83.42 HYPOMAGNESEMIA: Status: RESOLVED | Noted: 2017-06-04 | Resolved: 2017-09-17

## 2017-09-17 LAB
ALBUMIN SERPL BCP-MCNC: 2.9 G/DL
ALBUMIN SERPL BCP-MCNC: 2.9 G/DL
ALP SERPL-CCNC: 66 U/L
ALP SERPL-CCNC: 68 U/L
ALT SERPL W/O P-5'-P-CCNC: 16 U/L
ALT SERPL W/O P-5'-P-CCNC: 18 U/L
ANION GAP SERPL CALC-SCNC: 10 MMOL/L
ANION GAP SERPL CALC-SCNC: 11 MMOL/L
AST SERPL-CCNC: 13 U/L
AST SERPL-CCNC: 14 U/L
BILIRUB SERPL-MCNC: 0.5 MG/DL
BILIRUB SERPL-MCNC: 0.5 MG/DL
BUN SERPL-MCNC: 18 MG/DL
BUN SERPL-MCNC: 18 MG/DL
CALCIUM SERPL-MCNC: 8.6 MG/DL
CALCIUM SERPL-MCNC: 8.7 MG/DL
CHLORIDE SERPL-SCNC: 106 MMOL/L
CHLORIDE SERPL-SCNC: 106 MMOL/L
CO2 SERPL-SCNC: 24 MMOL/L
CO2 SERPL-SCNC: 25 MMOL/L
CREAT SERPL-MCNC: 0.8 MG/DL
CREAT SERPL-MCNC: 0.9 MG/DL
EST. GFR  (AFRICAN AMERICAN): >60 ML/MIN/1.73 M^2
EST. GFR  (AFRICAN AMERICAN): >60 ML/MIN/1.73 M^2
EST. GFR  (NON AFRICAN AMERICAN): >60 ML/MIN/1.73 M^2
EST. GFR  (NON AFRICAN AMERICAN): >60 ML/MIN/1.73 M^2
GLUCOSE SERPL-MCNC: 187 MG/DL
GLUCOSE SERPL-MCNC: 188 MG/DL
POCT GLUCOSE: 139 MG/DL (ref 70–110)
POCT GLUCOSE: 144 MG/DL (ref 70–110)
POCT GLUCOSE: 147 MG/DL (ref 70–110)
POCT GLUCOSE: 164 MG/DL (ref 70–110)
POCT GLUCOSE: 168 MG/DL (ref 70–110)
POCT GLUCOSE: 169 MG/DL (ref 70–110)
POCT GLUCOSE: 187 MG/DL (ref 70–110)
POCT GLUCOSE: 189 MG/DL (ref 70–110)
POCT GLUCOSE: 206 MG/DL (ref 70–110)
POCT GLUCOSE: 212 MG/DL (ref 70–110)
POCT GLUCOSE: 215 MG/DL (ref 70–110)
POCT GLUCOSE: 218 MG/DL (ref 70–110)
POCT GLUCOSE: 218 MG/DL (ref 70–110)
POCT GLUCOSE: 238 MG/DL (ref 70–110)
POCT GLUCOSE: 238 MG/DL (ref 70–110)
POCT GLUCOSE: 241 MG/DL (ref 70–110)
POTASSIUM SERPL-SCNC: 3.7 MMOL/L
POTASSIUM SERPL-SCNC: 3.8 MMOL/L
PROT SERPL-MCNC: 5.9 G/DL
PROT SERPL-MCNC: 6 G/DL
SODIUM SERPL-SCNC: 141 MMOL/L
SODIUM SERPL-SCNC: 141 MMOL/L

## 2017-09-17 PROCEDURE — 20600001 HC STEP DOWN PRIVATE ROOM

## 2017-09-17 PROCEDURE — A4216 STERILE WATER/SALINE, 10 ML: HCPCS | Performed by: NURSE PRACTITIONER

## 2017-09-17 PROCEDURE — 99233 SBSQ HOSP IP/OBS HIGH 50: CPT | Mod: ,,, | Performed by: PSYCHIATRY & NEUROLOGY

## 2017-09-17 PROCEDURE — 27000221 HC OXYGEN, UP TO 24 HOURS

## 2017-09-17 PROCEDURE — 36415 COLL VENOUS BLD VENIPUNCTURE: CPT

## 2017-09-17 PROCEDURE — 25000003 PHARM REV CODE 250: Performed by: PHYSICIAN ASSISTANT

## 2017-09-17 PROCEDURE — 63600175 PHARM REV CODE 636 W HCPCS: Performed by: NURSE PRACTITIONER

## 2017-09-17 PROCEDURE — 94660 CPAP INITIATION&MGMT: CPT

## 2017-09-17 PROCEDURE — 25000003 PHARM REV CODE 250: Performed by: NURSE PRACTITIONER

## 2017-09-17 PROCEDURE — 99900035 HC TECH TIME PER 15 MIN (STAT)

## 2017-09-17 PROCEDURE — 80053 COMPREHEN METABOLIC PANEL: CPT | Mod: 91

## 2017-09-17 PROCEDURE — 99232 SBSQ HOSP IP/OBS MODERATE 35: CPT | Mod: ,,, | Performed by: NURSE PRACTITIONER

## 2017-09-17 PROCEDURE — 63600175 PHARM REV CODE 636 W HCPCS: Performed by: PHYSICIAN ASSISTANT

## 2017-09-17 RX ADMIN — HEPARIN SODIUM 5000 UNITS: 5000 INJECTION, SOLUTION INTRAVENOUS; SUBCUTANEOUS at 02:09

## 2017-09-17 RX ADMIN — METHYLPREDNISOLONE SODIUM SUCCINATE: 1 INJECTION, POWDER, LYOPHILIZED, FOR SOLUTION INTRAMUSCULAR; INTRAVENOUS at 08:09

## 2017-09-17 RX ADMIN — VITAMIN D, TAB 1000IU (100/BT) 5000 UNITS: 25 TAB at 08:09

## 2017-09-17 RX ADMIN — ASPIRIN 81 MG: 81 TABLET, COATED ORAL at 08:09

## 2017-09-17 RX ADMIN — PANTOPRAZOLE SODIUM 40 MG: 40 TABLET, DELAYED RELEASE ORAL at 08:09

## 2017-09-17 RX ADMIN — DILTIAZEM HYDROCHLORIDE 240 MG: 240 CAPSULE, EXTENDED RELEASE ORAL at 08:09

## 2017-09-17 RX ADMIN — SODIUM CHLORIDE 4.9 UNITS/HR: 9 INJECTION, SOLUTION INTRAVENOUS at 07:09

## 2017-09-17 RX ADMIN — SERTRALINE HYDROCHLORIDE 150 MG: 100 TABLET ORAL at 08:09

## 2017-09-17 RX ADMIN — Medication 1 CAPSULE: at 09:09

## 2017-09-17 RX ADMIN — ATENOLOL 50 MG: 50 TABLET ORAL at 08:09

## 2017-09-17 RX ADMIN — VALSARTAN 320 MG: 160 TABLET, FILM COATED ORAL at 08:09

## 2017-09-17 RX ADMIN — HEPARIN SODIUM 5000 UNITS: 5000 INJECTION, SOLUTION INTRAVENOUS; SUBCUTANEOUS at 09:09

## 2017-09-17 RX ADMIN — HEPARIN SODIUM 5000 UNITS: 5000 INJECTION, SOLUTION INTRAVENOUS; SUBCUTANEOUS at 05:09

## 2017-09-17 RX ADMIN — ATORVASTATIN CALCIUM 40 MG: 20 TABLET, FILM COATED ORAL at 08:09

## 2017-09-17 RX ADMIN — Medication: at 05:09

## 2017-09-17 RX ADMIN — Medication 3 ML: at 09:09

## 2017-09-17 RX ADMIN — Medication 3 ML: at 02:09

## 2017-09-17 RX ADMIN — Medication 1 CAPSULE: at 08:09

## 2017-09-17 RX ADMIN — LEVETIRACETAM 500 MG: 500 TABLET ORAL at 09:09

## 2017-09-17 RX ADMIN — LEVETIRACETAM 500 MG: 500 TABLET ORAL at 08:09

## 2017-09-17 RX ADMIN — SODIUM CHLORIDE: 0.9 INJECTION, SOLUTION INTRAVENOUS at 06:09

## 2017-09-17 NOTE — PLAN OF CARE
Problem: Fall Risk (Adult)  Goal: Identify Related Risk Factors and Signs and Symptoms  Related risk factors and signs and symptoms are identified upon initiation of Human Response Clinical Practice Guideline (CPG)   Outcome: Ongoing (interventions implemented as appropriate)  Fall precautions maintained, call bell within reach, wife at bedside, pt AAOX4, no distress noted. VSS, pt ate all of meals, insulin infusion going, pt tolerating well. Will continue to monitor.

## 2017-09-17 NOTE — PROGRESS NOTES
[facility]  Vascular Neurology  Comprehensive Stroke Center  Progress Note    Assessment/Plan:     58 yo M with PMHx of HTN, HLD, DM II, CNS vasculitis--not currently on therapy, discharged in June 2017 for MS vs cardio-embolic vs watershade stroke, recently admitted and discharged for AMS presented to hospital as a transfer from [facility] for evaluation of acute stroke revealed on outpatient MRI imaging. Plan for 3 d IV solumedrol, placement of central line for PLEX to be started 09/18/17.    * Lacunar stroke of left subthalamic region    57 y.o. male with significant past medical history of HTN, HLD, DM II, CNS vasculitis (not currently on therapy), discharged in June 2017 for MS vs cardio-embolic vs watershade stroke. Cerebral angiogram in May 2017 consistent with cerebral vasculitis vs multifocal atherosclerotic disease. Recently admitted and discharged for AMS. Pt presented to hospital as a transfer from [facility] for evaluation of acute stroke revealed on outpatient MRI imaging.    - Antithrombotics for secondary stroke prevention: Antiplatelets: ASA 81 mg po qd  - Statins for secondary stroke prevention, HLD: Atorvastatin 40 mg po qd  - Aggressive risk factor modification: HTN, DM II, HLD, Obesity  - Rehab Efforts: PT/OT/SLP  - VTE Prophylaxis: Heparin 5000 U q8h      CNS vasculitis    -Cerebral angiogram 05/2017 c/w cerebral vasculitis vs multifocal atherosclerotic disease.  -Signs of demyelinating disease vs vasculitis on recent imaging. Pt sees Dr. Love, recently taken off chemo 2/2 neutropenia.  -Last seen by Rheum (Dr. Durant) in May 2017 with thorough autoimmune workup negative at that time.  -IP consult to Rheumatology. Recs:   - Likely Primary CNS vasculitis. Pt currently encephalopathic.     - Start low dose Cytoxan or Rituximab (not much data on using other immunosuppressive treatments.)  -Attending spoke with Dr. Love who has concerns for starting pt on Rituximab  d/t risk of prolonged immunosuppression without sufficient evidence of its efficacy. Recommends monitoring pt's response to CSTs x3 days and then considering PLEX 09/18/17. If pt responds well to plasma exchange, likely a good indication that Rituximab will be effective and can start that therapy. If plasma exchange is not effective however, will consider re-starting Cyclophosphamide at a lower dose at that time.    -IV Solumedrol 1g x 3 days (day 3), likely will contribute to hyperglycemia (Endocrine on board).  Anesthesia consult for central line placement for PLEX to start 09/18/17.  -Spoke to Marky with Anesthesia who asked to call in am regarding placement of Brevia catheter for PLEX 2/2 lack of personnel on weekend to accomodate procedure today.  Will call blood bank to arrange for starting PLEX 09/18/17 after Brevia placement.      JOHN (obstructive sleep apnea)    -CPAP qHS, tolerating well currently        Type 2 diabetes mellitus with hyperglycemia, with long-term current use of insulin    - Stroke risk factor  - Consult to IP Endocrinology. Recs:   - BG goal 140-180; recalculated MDI doses based on 109 kg x 0.6u/kg. Home doses MDI uneven basal / bolus insulin doses given steroid use.    - Change Levemir to 30 units AM - first dose now.    - Change Novolog to 10 units AC - given high dose Solumedrol pulse x 3 days   - Continue moderate dose Novolog correction scale coverage.    - BG monitoring ac/hs   - Order bedside RN to perform insulin pen training for wife as she has never had DM edu/insulin instruction.    - Discharge planning: given Medicaid limited options - continue Victoza, metformin. Anticipate convert Tresiba to Levemir for insurance coverage. Anticipate adjust Novolog AC dose based on steroid dose. F/u with CHANCE Steele NP.      Essential hypertension    -Stroke risk factor.  Recommend SBP<180.  Continue home meds.      Mixed hyperlipidemia    -Stroke risk factor.  LDL 69.2, continue home dose  atorvastatin.     Neurologic Chief Complaint: Acute encephalopathy, cognitive deficits    Subjective:     Interval History: Patient is seen for follow-up neurological assessment and treatment recommendations.  Patient has no complaints this am.  Patient and wife both note improvements.  Of note, improvement in L LR weakness seen on exam yesterday.  Wife also reports improved balance and cognition.  Plan for placement of Brevia--discussed with patient.    Talked to Marky with Anesthesia, who asked that we call in am for placement due to lack of personnel to complete procedure today.    HPI, Past Medical, Family, and Social History remains the same as documented in the initial encounter.     Review of Systems   Constitutional: Negative for chills and fever.   Eyes: Negative for photophobia and visual disturbance.   Respiratory: Negative for cough and shortness of breath.    Cardiovascular: Negative for chest pain and palpitations.   Musculoskeletal: Negative for arthralgias and myalgias.   Skin: Negative for rash and wound.   Neurological: Positive for dizziness. Negative for speech difficulty, weakness and numbness.   Hematological: Negative for adenopathy. Does not bruise/bleed easily.   Psychiatric/Behavioral: Positive for confusion and decreased concentration. Negative for agitation.     Scheduled Meds:   aspirin  81 mg Oral Daily    atenolol  50 mg Oral Daily    atorvastatin  40 mg Oral Daily    diltiaZEM  240 mg Oral Daily    heparin (porcine)  5,000 Units Subcutaneous Q8H    levetiracetam  500 mg Oral BID    omega-3 fatty acids-fish oil  1 capsule Oral BID    pantoprazole  40 mg Oral Daily    sertraline  150 mg Oral Daily    sodium chloride 0.9%  3 mL Intravenous Q8H    valsartan  320 mg Oral Daily    vitamin D  5,000 Units Oral Daily     Continuous Infusions:   sodium chloride 0.9% 125 mL/hr at 09/17/17 0700    insulin (HUMAN R) infusion (adults) 4.9 Units/hr (09/17/17 1625)    sodium chloride  0.9%       PRN Meds:dextrose 50%, dextrose 50%, labetalol, ondansetron, ondansetron, sodium chloride 0.9%    Objective:     Vital Signs (Most Recent):  Temp: 97.1 °F (36.2 °C) (09/17/17 1652)  Pulse: 72 (09/17/17 1652)  Resp: 18 (09/17/17 1652)  BP: 135/83 (09/17/17 1652)  SpO2: 98 % (09/17/17 1652)  BP Location: Right arm    Vital Signs Range (Last 24H):  Temp:  [97 °F (36.1 °C)-98 °F (36.7 °C)]   Pulse:  [68-82]   Resp:  [16-18]   BP: (123-166)/(66-89)   SpO2:  [92 %-99 %]   BP Location: Right arm    Physical Exam  General:  Well-developed, obese, nad  HEENT:  NCAT, PERRL, EOMI.  Oropharyngeal membranes non-erythematous/without exudate.  Neck:  Supple, no palpable lad, no apparent JVD  Resp:  Symmetric expansion, no increased wob  CVS:  Trace LE edema.  Peripheral pulses 2+ (radial, posterior tibial)  GI:  Abd obese, soft, non-distended, non-tender to palpation  Neurological Exam:   Mental Status:  AAOx3.  Speech, thought content appropriate.  Recent recall 3/3, remote recall with minor deficits.  Cranial Nerves:  PERRLA, EOMI--no longer L LR weakness.  VFs full.  Facial movement, sensation intact and symmetric.  Palate raise symmetric, tongue protrudes midline.  Trapezius/SCM 5/5.  Motor:  Normal bulk and tone.  Strength diffusely 5/5.  Sensory:  Intact to light touch and temperature at all extremities, no inattention.  Reflexes:  Biceps, brachioradialis, patellar 2+.  No ankle clonus.  Downgoing toe bilaterally.  Coordination:  FNF, ALONSO, HTS intact, symmetric.  Gait:  No imbalance, normal stride length, no shuffling, normal posture.  Mild sway on Romberg but maintains balance.    NIH Stroke Scale:  Interval: 7 days or at discharge (whichever comes first)  Level of Consciousness: 0 - alert  LOC Questions: 0 - answers both correctly  LOC Commands: 0 - performs both correctly  Best Gaze: 0 - normal  Visual: 0 - no visual loss  Facial Palsy: 0 - normal  Motor Left Arm: 0 - no drift  Motor Right Arm: 0 - no  drift  Motor Left Le - no drift  Motor Right Le - no drift  Limb Ataxia: 0 - absent  Sensory: 0 - normal  Best Language: 0 - no aphasia  Dysarthria: 0 - normal articulation  Extinction and Inattention: 0 - no neglect  NIH Stroke Scale Total: 0      Laboratory:  CMP:     Recent Labs  Lab 17  0455   CALCIUM 8.7  8.6*   ALBUMIN 2.9*  2.9*   PROT 6.0  5.9*     141   K 3.8  3.7   CO2 25  24     106   BUN 18  18   CREATININE 0.9  0.8   ALKPHOS 68  66   ALT 18  16   AST 13  14   BILITOT 0.5  0.5     CBC:     Recent Labs  Lab 17  0414   WBC 11.02   RBC 4.09*   HGB 11.3*   HCT 32.7*      MCV 80*   MCH 27.6   MCHC 34.6     Lipid Panel:     Recent Labs  Lab 09/15/17  0819   CHOL 190   LDLCALC 69.2   HDL 51   TRIG 349*     Coagulation:     Recent Labs  Lab 09/15/17  0424   INR 1.0   APTT 22.2     Hgb A1C:     Recent Labs  Lab 09/15/17  0046   HGBA1C 10.8*     TSH:     Recent Labs  Lab 17  1135   TSH 1.799     Diagnostic Results:  17 MRI Brain w/ w/o contrast:  1. Findings compatible with a small acute lacunar infarct adjacent to the left frontal horn.  Nonspecific enhancement just inferior to this region and extending into the left basal ganglia, as well as within the inferior aspect of the left cerebellum.  No evidence of a focal mass.  2.  Extensive increased signal intensity involving the periventricular white matter compatible with demyelinating disease versus vasculitis.  3.  Clinical correlation and followup recommended.  4.  This reportedly flattened in the EPIC and the corpus callosum medical record system.       Kae Armstrong MD  Comprehensive Stroke Center  Department of Vascular Neurology   [facility]

## 2017-09-17 NOTE — PLAN OF CARE
Problem: Patient Care Overview  Goal: Plan of Care Review  Outcome: Ongoing (interventions implemented as appropriate)  POC reviewed with pt at 21:00. VS and neuro checks performed q4hr. No acute changes overnight. Pt remained free from falls overnight. Wife remained at bedside. Pt remains on insulin drip with blood sugar checked q1hr. Continues to be hyperglycemic and will continue to titrate as ordered according to algorithm. Pt remained afebrile overnight. Pt repositioned himself independently. Pt slept soundly with CPAP machine on. Will continue to monitor.

## 2017-09-17 NOTE — SUBJECTIVE & OBJECTIVE
"Interval HPI:   Overnight events: BG much improved but remains above goal, on 2.8-7 units/hr overnight of intensive insulin infusion. Plan for last Solumedrol dose 1000 mg IV this AM. Also plan to start PLEX Monday. No d/c plans this weekend, will likely remain hospitalized through week per primary tem   Eating:   SF clears  Nausea: No  Hypoglycemia and intervention: No  Fever: No  TPN and/or TF: No  If yes, type of TF/TPN and rate:     BP (!) 146/88 (BP Location: Right arm, Patient Position: Lying)   Pulse 72   Temp 97.4 °F (36.3 °C) (Axillary)   Resp 16   Ht 5' 10" (1.778 m)   Wt 97.8 kg (215 lb 11.2 oz)   SpO2 98%   BMI 30.95 kg/m²     Labs Reviewed and Include      Recent Labs  Lab 09/17/17  0455   *  188*   CALCIUM 8.7  8.6*   ALBUMIN 2.9*  2.9*   PROT 6.0  5.9*     141   K 3.8  3.7   CO2 25  24     106   BUN 18  18   CREATININE 0.9  0.8   ALKPHOS 68  66   ALT 18  16   AST 13  14   BILITOT 0.5  0.5     Lab Results   Component Value Date    WBC 11.02 09/16/2017    HGB 11.3 (L) 09/16/2017    HCT 32.7 (L) 09/16/2017    MCV 80 (L) 09/16/2017     09/16/2017       Recent Labs  Lab 09/11/17  1135   TSH 1.799     Lab Results   Component Value Date    HGBA1C 10.8 (H) 09/15/2017       Nutritional status:   Body mass index is 30.95 kg/m².  Lab Results   Component Value Date    ALBUMIN 2.9 (L) 09/17/2017    ALBUMIN 2.9 (L) 09/17/2017    ALBUMIN 3.1 (L) 09/16/2017     No results found for: PREALBUMIN    Estimated Creatinine Clearance: 103.6 mL/min (based on SCr of 0.9 mg/dL).    Accu-Checks  Recent Labs      09/16/17   2144  09/16/17   2240  09/16/17   2345  09/17/17   0037  09/17/17   0139  09/17/17   0241  09/17/17   0341  09/17/17   0432  09/17/17   0551  09/17/17   0634   POCTGLUCOSE  301*  267*  241*  212*  168*  187*  218*  169*  238*  238*       Current Medications and/or Treatments Impacting Glycemic Control  Immunotherapy:  Immunosuppressants     None        Steroids: "   Hormones     Start     Stop Route Frequency Ordered    09/15/17 1052  methylPREDNISolone sodium succinate (SOLU-MEDROL) 1,000 mg in dextrose 5 % 100 mL IVPB      09/18 0859 IV Daily 09/15/17 1053        Pressors:    Autonomic Drugs     None        Hyperglycemia/Diabetes Medications: Antihyperglycemics     Start     Stop Route Frequency Ordered    09/16/17 1438  insulin regular (Humulin R) 100 Units in sodium chloride 0.9% 100 mL infusion      -- IV Continuous 09/16/17 1431

## 2017-09-17 NOTE — ASSESSMENT & PLAN NOTE
-Cerebral angiogram 05/2017 c/w cerebral vasculitis vs multifocal atherosclerotic disease.  -Signs of demyelinating disease vs vasculitis on recent imaging. Pt sees Dr. Love, recently taken off chemo 2/2 neutropenia.  -Last seen by Rheum (Dr. Durant) in May 2017 with thorough autoimmune workup negative at that time.  -IP consult to Rheumatology. Recs:   - Likely Primary CNS vasculitis. Pt currently encephalopathic.     - Start low dose Cytoxan or Rituximab (not much data on using other immunosuppressive treatments.)  -Attending spoke with Dr. Love who has concerns for starting pt on Rituximab d/t risk of prolonged immunosuppression without sufficient evidence of its efficacy. Recommends monitoring pt's response to CSTs x3 days and then considering PLEX 09/18/17. If pt responds well to plasma exchange, likely a good indication that Rituximab will be effective and can start that therapy. If plasma exchange is not effective however, will consider re-starting Cyclophosphamide at a lower dose at that time.    -IV Solumedrol 1g x 3 days (day 3), likely will contribute to hyperglycemia (Endocrine on board).  Anesthesia consult for central line placement for PLEX to start 09/18/17.  -Spoke to Marky with Anesthesia who asked to call in am regarding placement of Brevia catheter for PLEX 2/2 lack of personnel on weekend to accomodate procedure today.  Will call blood bank to arrange for starting PLEX 09/18/17 after Brevia placement.

## 2017-09-17 NOTE — ASSESSMENT & PLAN NOTE
- Cerebral angiogram in May 2017 consistent with cerebral vasculitis vs multifocal atherosclerotic disease.  - Signs of demyelinating disease vs vasculitis on recent imaging. Pt sees Dr. Love outpatient and was recently taken off chemotherapy due to neutropenia.  - Last seen by Rheum (Dr. Durant) in May 2017 with thorough autoimmune workup negative at that time.  - IP consult to Rheumatology. Recs:   - Likely Primary CNS vasculitis. Pt currently encephalopathic.     - Start low dose Cytoxan or alternatively Rituximab (not much data on using other immunosuppressive treatments.)  - Attending physician spoke with Dr. Love who has concerns for starting pt on Rituximab d/t risk of prolonged immunosuppression without sufficient evidence of its efficacy. She recommends monitoring pt's response to CSTs x3 days and then considering plasma exchange on Monday. If pt responds well to plasma exchange, likely a good indication that Rituximab will be effective and can start that therapy. If plasma exchange is not effective however, will consider re-starting Cyclophosphamide at a lower dose at that time.    -IV Solumedrol 1g x 3 days (day 2), likely will contribute to hyperglycemia (Endocrine on board).  Anesthesia consult for central line placement for PLEX to start 09/18/17.

## 2017-09-17 NOTE — ASSESSMENT & PLAN NOTE
57 y.o. male with significant past medical history of HTN, HLD, DM II, CNS vasculitis (not currently on therapy), discharged in June 2017 for MS vs cardio-embolic vs watershade stroke. Cerebral angiogram in May 2017 consistent with cerebral vasculitis vs multifocal atherosclerotic disease. Recently admitted and discharged for AMS. Pt presented to hospital as a transfer from [facility] for evaluation of acute stroke revealed on outpatient MRI imaging.    - Antithrombotics for secondary stroke prevention: Antiplatelets: ASA 81 mg po qd  - Statins for secondary stroke prevention, HLD: Atorvastatin 40 mg po qd  - Aggressive risk factor modification: HTN, DM II, HLD, Obesity  - Rehab Efforts: PT/OT/SLP  - VTE Prophylaxis: Heparin 5000 U q8h

## 2017-09-17 NOTE — ASSESSMENT & PLAN NOTE
BG goal 140-180; BG improving but anticipate increase in insulin infusion as yesterday's dose of Levemir wears off. Continue intensive insulin infusion protocol, change BG to q2h. Possible goal to change to transition insulin infusion tomorrow if BG stable.     Of note: was requiring 30-40 units of Novolog with meals without much improved while on Solumedrol 1000 mg daily on 9/16    Will need decrease in doses on Monday 9/18 as last day of high dose Solumedrol is Sunday     -order bedside RN to perform insulin pen training for wife as she has never had DM edu/ insulin instruction.     Discharge planning: No d/c plans currently.  given Medicaid limited options - continue Victoza, metformin. Anticipate convert Tresiba to Levemir for insurance coverage. Anticipate adjust Novolog AC dose based on steroid dose.   F/u with CHANCE Steele NP - needs appt

## 2017-09-17 NOTE — SUBJECTIVE & OBJECTIVE
Neurologic Chief Complaint: Acute encephalopathy, cognitive deficits    Subjective:     Interval History: Patient is seen for follow-up neurological assessment and treatment recommendations.  Patient has no complaints this am.  Patient and wife both note improvements.  Of note, improvement in L LR weakness seen on exam yesterday.  Wife also reports improved balance and cognition.  Plan for placement of Brevia--discussed with patient.    Talked to Marky with Anesthesia, who asked that we call in am for placement due to lack of personnel to complete procedure today.    HPI, Past Medical, Family, and Social History remains the same as documented in the initial encounter.     Review of Systems   Constitutional: Negative for chills and fever.   Eyes: Negative for photophobia and visual disturbance.   Respiratory: Negative for cough and shortness of breath.    Cardiovascular: Negative for chest pain and palpitations.   Musculoskeletal: Negative for arthralgias and myalgias.   Skin: Negative for rash and wound.   Neurological: Positive for dizziness. Negative for speech difficulty, weakness and numbness.   Hematological: Negative for adenopathy. Does not bruise/bleed easily.   Psychiatric/Behavioral: Positive for confusion and decreased concentration. Negative for agitation.     Scheduled Meds:   aspirin  81 mg Oral Daily    atenolol  50 mg Oral Daily    atorvastatin  40 mg Oral Daily    diltiaZEM  240 mg Oral Daily    heparin (porcine)  5,000 Units Subcutaneous Q8H    levetiracetam  500 mg Oral BID    omega-3 fatty acids-fish oil  1 capsule Oral BID    pantoprazole  40 mg Oral Daily    sertraline  150 mg Oral Daily    sodium chloride 0.9%  3 mL Intravenous Q8H    valsartan  320 mg Oral Daily    vitamin D  5,000 Units Oral Daily     Continuous Infusions:   sodium chloride 0.9% 125 mL/hr at 09/17/17 0700    insulin (HUMAN R) infusion (adults) 4.9 Units/hr (09/17/17 1625)    sodium chloride 0.9%       PRN  Meds:dextrose 50%, dextrose 50%, labetalol, ondansetron, ondansetron, sodium chloride 0.9%    Objective:     Vital Signs (Most Recent):  Temp: 97.1 °F (36.2 °C) (09/17/17 1652)  Pulse: 72 (09/17/17 1652)  Resp: 18 (09/17/17 1652)  BP: 135/83 (09/17/17 1652)  SpO2: 98 % (09/17/17 1652)  BP Location: Right arm    Vital Signs Range (Last 24H):  Temp:  [97 °F (36.1 °C)-98 °F (36.7 °C)]   Pulse:  [68-82]   Resp:  [16-18]   BP: (123-166)/(66-89)   SpO2:  [92 %-99 %]   BP Location: Right arm    Physical Exam  General:  Well-developed, obese, nad  HEENT:  NCAT, PERRL, EOMI with nystagmus and incomplete L LR movement on leftward gaze.  Neck:  Supple, no palpable lad, no apparent JVD  Resp:  Symmetric expansion, no increased wob  CVS:  Trace LE edema.  Peripheral pulses 2+ (radial, posterior tibial)  GI:  Abd obese, soft, non-distended, non-tender to palpation  Neurological Exam:   Mental Status:  AAOx3.  Speech, thought content appropriate.  Recent recall 3/3, remote recall with minor deficits.  Cranial Nerves:  Some L LR weakness on leftward gaze with few beats nystagmus.  VFs full.  Facial movement, sensation intact and symmetric.  Palate raise symmetric, tongue protrudes midline.  Trapezius/SCM 5/5.  Motor:  Normal bulk and tone.  Strength diffusely 5/5.  Sensory:  Intact to light touch and temperature at all extremities, no inattention.  Reflexes:  Biceps, brachioradialis, patellar 2+.  No ankle clonus.  Downgoing toe bilaterally.  Coordination:  FNF, ALONSO, HTS intact, symmetric.  Gait:  No imbalance, normal stride length, no shuffling, normal posture.  Mild sway on Romberg but maintains balance.    NIH Stroke Scale:  Interval: 7 days or at discharge (whichever comes first)  Level of Consciousness: 0 - alert  LOC Questions: 0 - answers both correctly  LOC Commands: 0 - performs both correctly  Best Gaze: 0 - normal  Visual: 0 - no visual loss  Facial Palsy: 0 - normal  Motor Left Arm: 0 - no drift  Motor Right Arm: 0 - no  drift  Motor Left Le - no drift  Motor Right Le - no drift  Limb Ataxia: 0 - absent  Sensory: 0 - normal  Best Language: 0 - no aphasia  Dysarthria: 0 - normal articulation  Extinction and Inattention: 0 - no neglect  NIH Stroke Scale Total: 0      Laboratory:  CMP:     Recent Labs  Lab 17  0455   CALCIUM 8.7  8.6*   ALBUMIN 2.9*  2.9*   PROT 6.0  5.9*     141   K 3.8  3.7   CO2 25  24     106   BUN 18  18   CREATININE 0.9  0.8   ALKPHOS 68  66   ALT 18  16   AST 13  14   BILITOT 0.5  0.5     CBC:     Recent Labs  Lab 17  0414   WBC 11.02   RBC 4.09*   HGB 11.3*   HCT 32.7*      MCV 80*   MCH 27.6   MCHC 34.6     Lipid Panel:     Recent Labs  Lab 09/15/17  0819   CHOL 190   LDLCALC 69.2   HDL 51   TRIG 349*     Coagulation:     Recent Labs  Lab 09/15/17  0424   INR 1.0   APTT 22.2     Hgb A1C:     Recent Labs  Lab 09/15/17  0046   HGBA1C 10.8*     TSH:     Recent Labs  Lab 17  1135   TSH 1.799     Diagnostic Results:  17 MRI Brain w/ w/o contrast:  1. Findings compatible with a small acute lacunar infarct adjacent to the left frontal horn.  Nonspecific enhancement just inferior to this region and extending into the left basal ganglia, as well as within the inferior aspect of the left cerebellum.  No evidence of a focal mass.  2.  Extensive increased signal intensity involving the periventricular white matter compatible with demyelinating disease versus vasculitis.  3.  Clinical correlation and followup recommended.  4.  This reportedly flattened in the EPIC and the corpus callosum medical record system.

## 2017-09-17 NOTE — PROGRESS NOTES
"[facility]  Endocrinology  Progress Note    Admit Date: 9/14/2017     Reason for Consult: Management of T2DM, Hyperglycemia     Diabetes diagnosis year: 9457-5545    Home Diabetes Medications:  Victoza 1.8mg QD, metformin BID, Levemir flextouch 20 units PM, Novolog flextouch 20 units AC  Lab Results   Component Value Date    HGBA1C 10.8 (H) 09/15/2017     How often checking glucose at home? 1-3 x day   BG readings on regimen: 325-350s  Hypoglycemia on the regimen?  No  Missed doses on regimen?  None recently as wife has been assisting with BG monitoring and insulin administration since 5/2017.     Diabetes Complications include:   Hyperglycemia    Complicating diabetes co morbidities: Glucocorticoid use  and Dementia    HPI: Patient is a 57 y.o. male with a diagnosis of T2DM based on bloodwork. He is managed by CHANCE Steele NP on [facility]. Last seen 3/2017 when converted from Bydureon to Victoza and Levemir to Tresiba. Tresiba is NOT covered by insurance. He is now receiving Medicaid.  Never hospitalized r/t DM.     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 He underwent chemo with steroid therapy since May 2017 which contributed to extreme BG elevations in 300 range. He has been confused, unsteady, frequent falls at home, fatigue "sleeps up to 20 hours a day".  He was recently admitted with AMS. MRI revealed CVA. He was admitted to [facility],  on admit. Started on low dose MDI. Plans for Solumedrol 1000mg IV x 3 days (9/15 - 9/18).  Endocrine consulted for BG mgmt.     Interval HPI:   Overnight events: BG much improved but remains above goal, on 2.8-7 units/hr overnight of intensive insulin infusion. Plan for last Solumedrol dose 1000 mg IV this AM. Also plan to start PLEX Monday. No d/c plans this weekend, will likely remain hospitalized through week per primary tem   Eating:   SF clears  Nausea: No  Hypoglycemia and intervention: No  Fever: No  TPN and/or TF: No  If yes, type of TF/TPN " "and rate:     BP (!) 146/88 (BP Location: Right arm, Patient Position: Lying)   Pulse 72   Temp 97.4 °F (36.3 °C) (Axillary)   Resp 16   Ht 5' 10" (1.778 m)   Wt 97.8 kg (215 lb 11.2 oz)   SpO2 98%   BMI 30.95 kg/m²       Labs Reviewed and Include      Recent Labs  Lab 09/17/17  0455   *  188*   CALCIUM 8.7  8.6*   ALBUMIN 2.9*  2.9*   PROT 6.0  5.9*     141   K 3.8  3.7   CO2 25  24     106   BUN 18  18   CREATININE 0.9  0.8   ALKPHOS 68  66   ALT 18  16   AST 13  14   BILITOT 0.5  0.5     Lab Results   Component Value Date    WBC 11.02 09/16/2017    HGB 11.3 (L) 09/16/2017    HCT 32.7 (L) 09/16/2017    MCV 80 (L) 09/16/2017     09/16/2017       Recent Labs  Lab 09/11/17  1135   TSH 1.799     Lab Results   Component Value Date    HGBA1C 10.8 (H) 09/15/2017       Nutritional status:   Body mass index is 30.95 kg/m².  Lab Results   Component Value Date    ALBUMIN 2.9 (L) 09/17/2017    ALBUMIN 2.9 (L) 09/17/2017    ALBUMIN 3.1 (L) 09/16/2017     No results found for: PREALBUMIN    Estimated Creatinine Clearance: 103.6 mL/min (based on SCr of 0.9 mg/dL).    Accu-Checks  Recent Labs      09/16/17   2144  09/16/17   2240  09/16/17   2345  09/17/17   0037  09/17/17   0139  09/17/17   0241  09/17/17   0341  09/17/17   0432  09/17/17   0551  09/17/17   0634   POCTGLUCOSE  301*  267*  241*  212*  168*  187*  218*  169*  238*  238*       Current Medications and/or Treatments Impacting Glycemic Control  Immunotherapy:  Immunosuppressants     None        Steroids:   Hormones     Start     Stop Route Frequency Ordered    09/15/17 1052  methylPREDNISolone sodium succinate (SOLU-MEDROL) 1,000 mg in dextrose 5 % 100 mL IVPB      09/18 0859 IV Daily 09/15/17 1053        Pressors:    Autonomic Drugs     None        Hyperglycemia/Diabetes Medications: Antihyperglycemics     Start     Stop Route Frequency Ordered    09/16/17 1438  insulin regular (Humulin R) 100 Units in sodium chloride " 0.9% 100 mL infusion      -- IV Continuous 09/16/17 2168          ASSESSMENT and PLAN    * Lacunar stroke of left subthalamic region    Seen on MRI  avoid hypoglycemia        Type 2 diabetes mellitus with diabetic polyneuropathy, with long-term current use of insulin    See above          Type 2 diabetes mellitus with hyperglycemia, with long-term current use of insulin    BG goal 140-180; BG improving but anticipate increase in insulin infusion as yesterday's dose of Levemir wears off. Continue intensive insulin infusion protocol, change BG to q2h. Possible goal to change to transition insulin infusion tomorrow if BG stable.     Of note: was requiring 30-40 units of Novolog with meals without much improved while on Solumedrol 1000 mg daily on 9/16    Will need decrease in doses on Monday 9/18 as last day of high dose Solumedrol is Sunday     -order bedside RN to perform insulin pen training for wife as she has never had DM edu/ insulin instruction.     Discharge planning: No d/c plans currently.  given Medicaid limited options - continue Victoza, metformin. Anticipate convert Tresiba to Levemir for insurance coverage. Anticipate adjust Novolog AC dose based on steroid dose.   F/u with CHANCE Steele NP - needs appt           CNS vasculitis    sees Dr. Love outpatient and was taken off of chemotherapy due to decreased WBC count.    - avoid hypoglycemia          Adverse effect of glucocorticoids and synthetic analogues, initial encounter    Solumedrol 1000mg IV QD x 3 9/15/17 - 9/18/17  Anticipate high in BG readings          Obesity (BMI 30-39.9)    Body mass index is 30.95 kg/m².  may contribute to insulin resistance              Corinne Renae NP  Endocrinology  [facility]

## 2017-09-17 NOTE — ASSESSMENT & PLAN NOTE
- Stroke risk factor  - Consult to IP Endocrinology. Recs:   - BG goal 140-180; recalculated MDI doses based on 109 kg x 0.6u/kg. Home doses MDI uneven basal / bolus insulin doses given steroid use.    - Change Levemir to 30 units AM - first dose now.    - Change Novolog to 10 units AC - given high dose Solumedrol pulse x 3 days   - Continue moderate dose Novolog correction scale coverage.    - BG monitoring ac/hs   - Order bedside RN to perform insulin pen training for wife as she has never had DM edu/insulin instruction.    - Discharge planning: given Medicaid limited options - continue Victoza, metformin. Anticipate convert Tresiba to Levemir for insurance coverage. Anticipate adjust Novolog AC dose based on steroid dose. F/u with CHANCE Steele NP.

## 2017-09-17 NOTE — SUBJECTIVE & OBJECTIVE
Neurologic Chief Complaint: Acute encephalopathy, cognitive deficits    Subjective:     Interval History: Patient is seen for follow-up neurological assessment and treatment recommendations.  Patient has no complaints this am.  Had high BG overnight, Endocrine providing active recs with plan to start IV insulin titrating drip.  Discussed plan for PLEX after 3rd dose of IV solumedrol.  Patient understands and agreeable.  No family at bedside this am, will discuss plan with patient and any family present tomorrow.    HPI, Past Medical, Family, and Social History remains the same as documented in the initial encounter.     Review of Systems   Constitutional: Negative for chills and fever.   Eyes: Negative for photophobia and visual disturbance.   Respiratory: Negative for cough and shortness of breath.    Cardiovascular: Negative for chest pain and palpitations.   Musculoskeletal: Negative for arthralgias and myalgias.   Skin: Negative for rash and wound.   Neurological: Positive for dizziness. Negative for speech difficulty, weakness and numbness.   Hematological: Negative for adenopathy. Does not bruise/bleed easily.   Psychiatric/Behavioral: Positive for confusion and decreased concentration. Negative for agitation.     Scheduled Meds:   aspirin  81 mg Oral Daily    atenolol  50 mg Oral Daily    atorvastatin  40 mg Oral Daily    diltiaZEM  240 mg Oral Daily    heparin (porcine)  5,000 Units Subcutaneous Q8H    levetiracetam  500 mg Oral BID    methylPREDNISolone (SOLU-Medrol) IVPB (doses > 250 mg)   Intravenous Daily    omega-3 fatty acids-fish oil  1 capsule Oral BID    pantoprazole  40 mg Oral Daily    sertraline  150 mg Oral Daily    sodium chloride 0.9%  3 mL Intravenous Q8H    valsartan  320 mg Oral Daily    vitamin D  5,000 Units Oral Daily     Continuous Infusions:   sodium chloride 0.9% 125 mL/hr at 09/16/17 0211    insulin (HUMAN R) infusion (adults) 7 Units/hr (09/16/17 1943)    sodium  chloride 0.9%       PRN Meds:dextrose 50%, dextrose 50%, labetalol, ondansetron, ondansetron, sodium chloride 0.9%    Objective:     Vital Signs (Most Recent):  Temp: 98.6 °F (37 °C) (09/16/17 1500)  Pulse: 81 (09/16/17 1900)  Resp: 18 (09/16/17 1500)  BP: (!) 143/80 (09/16/17 1500)  SpO2: 100 % (09/16/17 1500)  BP Location: Right arm    Vital Signs Range (Last 24H):  Temp:  [97.9 °F (36.6 °C)-98.6 °F (37 °C)]   Pulse:  [70-88]   Resp:  [15-18]   BP: (121-143)/(58-82)   SpO2:  [92 %-100 %]   BP Location: Right arm    Physical Exam  General:  Well-developed, obese, nad  HEENT:  NCAT, PERRL, EOMI with nystagmus and incomplete L LR movement on leftward gaze.  Neck:  Supple, no palpable lad, no apparent JVD  Resp:  Symmetric expansion, no increased wob  CVS:  Trace LE edema.  Peripheral pulses 2+ (radial, posterior tibial)  GI:  Abd obese, soft, non-distended, non-tender to palpation  Neurological Exam:   Mental Status:  AAOx3.  Speech, thought content appropriate.  Recent recall 3/3, remote recall with minor deficits.  Cranial Nerves:  Some L LR weakness on leftward gaze with few beats nystagmus.  VFs full.  Facial movement, sensation intact and symmetric.  Palate raise symmetric, tongue protrudes midline.  Trapezius/SCM 5/5.  Motor:  Normal bulk and tone.  Strength diffusely 5/5.  Sensory:  Intact to light touch and temperature at all extremities, no inattention.  Reflexes:  Biceps, brachioradialis, patellar 2+.  No ankle clonus.  Downgoing toe bilaterally.  Coordination:  FNF, ALONSO, HTS intact, symmetric.  Gait:  No imbalance, normal stride length, no shuffling, normal posture.  Mild sway on Romberg but maintains balance.    NIH Stroke Scale:  Interval: 7 days or at discharge (whichever comes first)  Level of Consciousness: 0 - alert  LOC Questions: 0 - answers both correctly  LOC Commands: 0 - performs both correctly  Best Gaze: 0 - normal  Visual: 0 - no visual loss  Facial Palsy: 0 - normal  Motor Left Arm: 0 - no  drift  Motor Right Arm: 0 - no drift  Motor Left Le - no drift  Motor Right Le - no drift  Limb Ataxia: 0 - absent  Sensory: 0 - normal  Best Language: 0 - no aphasia  Dysarthria: 0 - normal articulation  Extinction and Inattention: 0 - no neglect  NIH Stroke Scale Total: 0      Laboratory:  CMP:   Recent Labs  Lab 17  0414 17  1136   CALCIUM 8.9 8.9   ALBUMIN 3.1*  --    PROT 6.1  --     138   K 4.4 4.0   CO2 20* 19*    106   BUN 25* 23*   CREATININE 1.2 1.1   ALKPHOS 73  --    ALT 20  --    AST 19  --    BILITOT 0.7  --      CBC:   Recent Labs  Lab 17  041   WBC 11.02   RBC 4.09*   HGB 11.3*   HCT 32.7*      MCV 80*   MCH 27.6   MCHC 34.6     Lipid Panel:   Recent Labs  Lab 09/15/17  0819   CHOL 190   LDLCALC 69.2   HDL 51   TRIG 349*     Coagulation:   Recent Labs  Lab 09/15/17  0424   INR 1.0   APTT 22.2     Hgb A1C:   Recent Labs  Lab 09/15/17  0046   HGBA1C 10.8*     TSH:   Recent Labs  Lab 17  1135   TSH 1.799     Diagnostic Results:  17 MRI Brain w/ w/o contrast:  1. Findings compatible with a small acute lacunar infarct adjacent to the left frontal horn.  Nonspecific enhancement just inferior to this region and extending into the left basal ganglia, as well as within the inferior aspect of the left cerebellum.  No evidence of a focal mass.  2.  Extensive increased signal intensity involving the periventricular white matter compatible with demyelinating disease versus vasculitis.  3.  Clinical correlation and followup recommended.  4.  This reportedly flattened in the EPIC and the corpus callosum medical record system.

## 2017-09-17 NOTE — PROGRESS NOTES
[facility]  Vascular Neurology  Comprehensive Stroke Center  Progress Note    Assessment/Plan:     56 yo M with PMHx of HTN, HLD, DM II, CNS vasculitis--not currently on therapy, discharged in June 2017 for MS vs cardio-embolic vs watershade stroke, recently admitted and discharged for AMS presented to hospital as a transfer from [facility] for evaluation of acute stroke revealed on outpatient MRI imaging. Plan for 3 d IV solumedrol, placement of central line for PLEX to be started 09/18/17.    * Lacunar stroke of left subthalamic region    57 y.o. male with significant past medical history of HTN, HLD, DM II, CNS vasculitis (not currently on therapy), discharged in June 2017 for MS vs cardio-embolic vs watershade stroke. Cerebral angiogram in May 2017 consistent with cerebral vasculitis vs multifocal atherosclerotic disease. Recently admitted and discharged for AMS. Pt presented to hospital as a transfer from [facility] for evaluation of acute stroke revealed on outpatient MRI imaging.    - Antithrombotics for secondary stroke prevention: Antiplatelets: Aspirin: 81 mg oral daily  - Statins for secondary stroke prevention and hyperlipidemia, if present: Atorvastatin- 40 mg oral daily  - Aggressive risk factor modification: Hypertension, Diabetes, High Cholesterol and Obesity  - Rehab Efforts: Physical Therapy, Occupational Therapy, and Speech Therapy  - VTE Prophylaxis: Heparin 5000 units SQ every 8 hours      CNS vasculitis    - Cerebral angiogram in May 2017 consistent with cerebral vasculitis vs multifocal atherosclerotic disease.  -Signs of demyelinating disease vs vasculitis on imaging. Pt sees Dr. Love, taken off chemo 2/2 neutropenia.  -Last seen by Rheum (Dr. Durant) in May 2017 with thorough autoimmune workup negative at that time.  -IP consult to Rheumatology. Recs:   - Likely Primary CNS vasculitis. Pt currently encephalopathic.     - Start low dose Cytoxan or alternatively  Rituximab (not much data on other immunosuppressive tx.)  -Spoke with Dr. Love who has concerns for starting pt on Rituximab d/t risk of prolonged immunosuppression without sufficient evidence of its efficacy. She recommends monitoring pt's response to CSTs x3 days and then considering plasma exchange on Monday. If pt responds well to plasma exchange, likely a good indication that Rituximab will be effective and can start that therapy. If plasma exchange is not effective however, will consider re-starting Cyclophosphamide at a lower dose at that time.    -IV Solumedrol 1g x 3 days (day 2), likely will contribute to hyperglycemia (Endocrine on board).  Anesthesia consult for central line placement for PLEX to start 09/18/17.      JOHN (obstructive sleep apnea)    -CPAP qHS--Needs appropriate fitting for mask at home      Type 2 diabetes mellitus with hyperglycemia, with long-term current use of insulin    - Stroke risk factor  - Consult to IP Endocrinology. Recs:   - BG goal 140-180; recalculated MDI doses based on 109 kg x 0.6u/kg. Home doses MDI uneven basal / bolus insulin doses given steroid use.    - Change Levemir to 30 units AM - first dose now.    - Change Novolog to 10 units AC - given high dose Solumedrol pulse x 3 days   - Continue moderate dose Novolog correction scale coverage.    - BG monitoring ac/hs   - Bedside RN to perform insulin pen training for wife as she has never had DM edu/insulin instruction.    - Discharge planning: Continue Victoza, metformin. Anticipate convert Tresiba to Levemir for insurance coverage. Anticipate adjust Novolog AC dose based on steroid dose. F/u with CHANCE Steele NP.      Essential hypertension    -Stroke risk factor.  Recommend SBP<180.  Continue home meds      Mixed hyperlipidemia    -Stroke risk factor.  Continue Atorvastatin.     Neurologic Chief Complaint: Acute encephalopathy, cognitive deficits    Subjective:     Interval History: Patient is seen for follow-up  neurological assessment and treatment recommendations.  Patient has no complaints this am.  Had high BG overnight, Endocrine providing active recs with plan to start IV insulin titrating drip.  Discussed plan for PLEX after 3rd dose of IV solumedrol.  Patient understands and agreeable.  No family at bedside this am, will discuss plan with patient and any family present tomorrow.    HPI, Past Medical, Family, and Social History remains the same as documented in the initial encounter.     Review of Systems   Constitutional: Negative for chills and fever.   Eyes: Negative for photophobia and visual disturbance.   Respiratory: Negative for cough and shortness of breath.    Cardiovascular: Negative for chest pain and palpitations.   Musculoskeletal: Negative for arthralgias and myalgias.   Skin: Negative for rash and wound.   Neurological: Positive for dizziness. Negative for speech difficulty, weakness and numbness.   Hematological: Negative for adenopathy. Does not bruise/bleed easily.   Psychiatric/Behavioral: Positive for confusion and decreased concentration. Negative for agitation.     Scheduled Meds:   aspirin  81 mg Oral Daily    atenolol  50 mg Oral Daily    atorvastatin  40 mg Oral Daily    diltiaZEM  240 mg Oral Daily    heparin (porcine)  5,000 Units Subcutaneous Q8H    levetiracetam  500 mg Oral BID    methylPREDNISolone (SOLU-Medrol) IVPB (doses > 250 mg)   Intravenous Daily    omega-3 fatty acids-fish oil  1 capsule Oral BID    pantoprazole  40 mg Oral Daily    sertraline  150 mg Oral Daily    sodium chloride 0.9%  3 mL Intravenous Q8H    valsartan  320 mg Oral Daily    vitamin D  5,000 Units Oral Daily     Continuous Infusions:   sodium chloride 0.9% 125 mL/hr at 09/16/17 0211    insulin (HUMAN R) infusion (adults) 7 Units/hr (09/16/17 1943)    sodium chloride 0.9%       PRN Meds:dextrose 50%, dextrose 50%, labetalol, ondansetron, ondansetron, sodium chloride 0.9%    Objective:     Vital  Signs (Most Recent):  Temp: 98.6 °F (37 °C) (17 1500)  Pulse: 81 (17 1900)  Resp: 18 (17 1500)  BP: (!) 143/80 (17 1500)  SpO2: 100 % (17 1500)  BP Location: Right arm    Vital Signs Range (Last 24H):  Temp:  [97.9 °F (36.6 °C)-98.6 °F (37 °C)]   Pulse:  [70-88]   Resp:  [15-18]   BP: (121-143)/(58-82)   SpO2:  [92 %-100 %]   BP Location: Right arm    Physical Exam  General:  Well-developed, obese, nad  HEENT:  NCAT, PERRL, EOMI with nystagmus and incomplete L LR movement on leftward gaze.  Neck:  Supple, no palpable lad, no apparent JVD  Resp:  Symmetric expansion, no increased wob  CVS:  Trace LE edema.  Peripheral pulses 2+ (radial, posterior tibial)  GI:  Abd obese, soft, non-distended, non-tender to palpation  Neurological Exam:   Mental Status:  AAOx3.  Speech, thought content appropriate.  Recent recall 3/3, remote recall with minor deficits.  Cranial Nerves:  Some L LR weakness on leftward gaze with few beats nystagmus.  VFs full.  Facial movement, sensation intact and symmetric.  Palate raise symmetric, tongue protrudes midline.  Trapezius/SCM 5/5.  Motor:  Normal bulk and tone.  Strength diffusely 5/5.  Sensory:  Intact to light touch and temperature at all extremities, no inattention.  Reflexes:  Biceps, brachioradialis, patellar 2+.  No ankle clonus.  Downgoing toe bilaterally.  Coordination:  FNF, ALONSO, HTS intact, symmetric.  Gait:  No imbalance, normal stride length, no shuffling, normal posture.  Mild sway on Romberg but maintains balance.    NIH Stroke Scale:  Interval: 7 days or at discharge (whichever comes first)  Level of Consciousness: 0 - alert  LOC Questions: 0 - answers both correctly  LOC Commands: 0 - performs both correctly  Best Gaze: 0 - normal  Visual: 0 - no visual loss  Facial Palsy: 0 - normal  Motor Left Arm: 0 - no drift  Motor Right Arm: 0 - no drift  Motor Left Le - no drift  Motor Right Le - no drift  Limb Ataxia: 0 - absent  Sensory: 0 -  normal  Best Language: 0 - no aphasia  Dysarthria: 0 - normal articulation  Extinction and Inattention: 0 - no neglect  NIH Stroke Scale Total: 0      Laboratory:  CMP:   Recent Labs  Lab 09/16/17  0414 09/16/17  1136   CALCIUM 8.9 8.9   ALBUMIN 3.1*  --    PROT 6.1  --     138   K 4.4 4.0   CO2 20* 19*    106   BUN 25* 23*   CREATININE 1.2 1.1   ALKPHOS 73  --    ALT 20  --    AST 19  --    BILITOT 0.7  --      CBC:   Recent Labs  Lab 09/16/17  0414   WBC 11.02   RBC 4.09*   HGB 11.3*   HCT 32.7*      MCV 80*   MCH 27.6   MCHC 34.6     Lipid Panel:   Recent Labs  Lab 09/15/17  0819   CHOL 190   LDLCALC 69.2   HDL 51   TRIG 349*     Coagulation:   Recent Labs  Lab 09/15/17  0424   INR 1.0   APTT 22.2     Hgb A1C:   Recent Labs  Lab 09/15/17  0046   HGBA1C 10.8*     TSH:   Recent Labs  Lab 09/11/17  1135   TSH 1.799     Diagnostic Results:  09/14/17 MRI Brain w/ w/o contrast:  1. Findings compatible with a small acute lacunar infarct adjacent to the left frontal horn.  Nonspecific enhancement just inferior to this region and extending into the left basal ganglia, as well as within the inferior aspect of the left cerebellum.  No evidence of a focal mass.  2.  Extensive increased signal intensity involving the periventricular white matter compatible with demyelinating disease versus vasculitis.  3.  Clinical correlation and followup recommended.  4.  This reportedly flattened in the EPIC and the corpus callosum medical record system.    Kae Armstrong MD  Comprehensive Stroke Center  Department of Vascular Neurology   [facility]

## 2017-09-17 NOTE — PROGRESS NOTES
Per family request, patient's spouse would like adult briefs ordered for her  as he uses diapers when at home.

## 2017-09-17 NOTE — PROGRESS NOTES
Patient reached via Public Service (Telephone) after multiple attempts/ Advised needed IV started/ Stated was downstairs will return shortly.

## 2017-09-17 NOTE — PROGRESS NOTES
"Nurse entered room.  Patient tray over sink/ Water in room (large amount) - Room unsafe for patient.  Uable to contact EVS. Patient wife advised "will transport" patient in Wheelchair "around unit." / Nurse will attempt to clean.   "

## 2017-09-18 LAB
ALBUMIN SERPL BCP-MCNC: 2.9 G/DL
ALP SERPL-CCNC: 69 U/L
ALT SERPL W/O P-5'-P-CCNC: 17 U/L
ANION GAP SERPL CALC-SCNC: 10 MMOL/L
AST SERPL-CCNC: 15 U/L
BILIRUB SERPL-MCNC: 0.6 MG/DL
BUN SERPL-MCNC: 13 MG/DL
CALCIUM SERPL-MCNC: 8 MG/DL
CHLORIDE SERPL-SCNC: 105 MMOL/L
CO2 SERPL-SCNC: 25 MMOL/L
CREAT SERPL-MCNC: 0.7 MG/DL
EST. GFR  (AFRICAN AMERICAN): >60 ML/MIN/1.73 M^2
EST. GFR  (NON AFRICAN AMERICAN): >60 ML/MIN/1.73 M^2
GLUCOSE SERPL-MCNC: 162 MG/DL
POCT GLUCOSE: 141 MG/DL (ref 70–110)
POCT GLUCOSE: 155 MG/DL (ref 70–110)
POCT GLUCOSE: 165 MG/DL (ref 70–110)
POCT GLUCOSE: 167 MG/DL (ref 70–110)
POCT GLUCOSE: 168 MG/DL (ref 70–110)
POCT GLUCOSE: 198 MG/DL (ref 70–110)
POCT GLUCOSE: 257 MG/DL (ref 70–110)
POTASSIUM SERPL-SCNC: 2.9 MMOL/L
PROT SERPL-MCNC: 5.8 G/DL
SODIUM SERPL-SCNC: 140 MMOL/L

## 2017-09-18 PROCEDURE — 99900035 HC TECH TIME PER 15 MIN (STAT)

## 2017-09-18 PROCEDURE — 99233 SBSQ HOSP IP/OBS HIGH 50: CPT | Mod: ,,, | Performed by: NURSE PRACTITIONER

## 2017-09-18 PROCEDURE — 99231 SBSQ HOSP IP/OBS SF/LOW 25: CPT | Mod: ,,, | Performed by: INTERNAL MEDICINE

## 2017-09-18 PROCEDURE — 86704 HEP B CORE ANTIBODY TOTAL: CPT

## 2017-09-18 PROCEDURE — 97116 GAIT TRAINING THERAPY: CPT

## 2017-09-18 PROCEDURE — 63600175 PHARM REV CODE 636 W HCPCS: Performed by: NURSE PRACTITIONER

## 2017-09-18 PROCEDURE — 25000003 PHARM REV CODE 250: Performed by: NURSE PRACTITIONER

## 2017-09-18 PROCEDURE — 99233 SBSQ HOSP IP/OBS HIGH 50: CPT | Mod: ,,, | Performed by: PSYCHIATRY & NEUROLOGY

## 2017-09-18 PROCEDURE — 25000003 PHARM REV CODE 250: Performed by: INTERNAL MEDICINE

## 2017-09-18 PROCEDURE — G8988 SELF CARE GOAL STATUS: HCPCS | Mod: CI

## 2017-09-18 PROCEDURE — 97532 *HC OT COG SKL DEV EA 15: CPT

## 2017-09-18 PROCEDURE — 86706 HEP B SURFACE ANTIBODY: CPT

## 2017-09-18 PROCEDURE — 92526 ORAL FUNCTION THERAPY: CPT

## 2017-09-18 PROCEDURE — 36415 COLL VENOUS BLD VENIPUNCTURE: CPT

## 2017-09-18 PROCEDURE — 97530 THERAPEUTIC ACTIVITIES: CPT

## 2017-09-18 PROCEDURE — 94660 CPAP INITIATION&MGMT: CPT

## 2017-09-18 PROCEDURE — A4216 STERILE WATER/SALINE, 10 ML: HCPCS | Performed by: NURSE PRACTITIONER

## 2017-09-18 PROCEDURE — 63600175 PHARM REV CODE 636 W HCPCS: Performed by: INTERNAL MEDICINE

## 2017-09-18 PROCEDURE — 25000003 PHARM REV CODE 250: Performed by: STUDENT IN AN ORGANIZED HEALTH CARE EDUCATION/TRAINING PROGRAM

## 2017-09-18 PROCEDURE — 63600175 PHARM REV CODE 636 W HCPCS: Performed by: STUDENT IN AN ORGANIZED HEALTH CARE EDUCATION/TRAINING PROGRAM

## 2017-09-18 PROCEDURE — 99233 SBSQ HOSP IP/OBS HIGH 50: CPT | Mod: ,,, | Performed by: INTERNAL MEDICINE

## 2017-09-18 PROCEDURE — G8989 SELF CARE D/C STATUS: HCPCS | Mod: CJ

## 2017-09-18 PROCEDURE — 97535 SELF CARE MNGMENT TRAINING: CPT

## 2017-09-18 PROCEDURE — 20600001 HC STEP DOWN PRIVATE ROOM

## 2017-09-18 PROCEDURE — 81401 MOPATH PROCEDURE LEVEL 2: CPT

## 2017-09-18 PROCEDURE — 80053 COMPREHEN METABOLIC PANEL: CPT

## 2017-09-18 RX ORDER — CALCIUM CARBONATE 500(1250)
1000 TABLET ORAL ONCE
Refills: 0 | Status: ON HOLD | COMMUNITY
Start: 2017-09-18 | End: 2018-10-20 | Stop reason: HOSPADM

## 2017-09-18 RX ORDER — PREDNISONE 20 MG/1
60 TABLET ORAL DAILY
Status: DISCONTINUED | OUTPATIENT
Start: 2017-09-18 | End: 2017-09-19

## 2017-09-18 RX ORDER — HEPARIN 100 UNIT/ML
500 SYRINGE INTRAVENOUS
Status: DISCONTINUED | OUTPATIENT
Start: 2017-09-18 | End: 2017-09-19 | Stop reason: HOSPADM

## 2017-09-18 RX ORDER — SULFAMETHOXAZOLE AND TRIMETHOPRIM 800; 160 MG/1; MG/1
1 TABLET ORAL
Status: DISCONTINUED | OUTPATIENT
Start: 2017-09-20 | End: 2017-09-19 | Stop reason: HOSPADM

## 2017-09-18 RX ORDER — SULFAMETHOXAZOLE AND TRIMETHOPRIM 800; 160 MG/1; MG/1
1 TABLET ORAL
Qty: 12 TABLET | Refills: 3 | Status: SHIPPED | OUTPATIENT
Start: 2017-09-20 | End: 2017-10-20 | Stop reason: SDUPTHER

## 2017-09-18 RX ORDER — IBUPROFEN 200 MG
16 TABLET ORAL
Status: DISCONTINUED | OUTPATIENT
Start: 2017-09-18 | End: 2017-09-19

## 2017-09-18 RX ORDER — IBUPROFEN 200 MG
24 TABLET ORAL
Status: DISCONTINUED | OUTPATIENT
Start: 2017-09-18 | End: 2017-09-19

## 2017-09-18 RX ORDER — CALCIUM CARBONATE 500(1250)
1000 TABLET ORAL ONCE
Status: DISCONTINUED | OUTPATIENT
Start: 2017-09-18 | End: 2017-09-19 | Stop reason: HOSPADM

## 2017-09-18 RX ORDER — SODIUM CHLORIDE 0.9 % (FLUSH) 0.9 %
10 SYRINGE (ML) INJECTION
Status: DISCONTINUED | OUTPATIENT
Start: 2017-09-18 | End: 2017-09-19 | Stop reason: HOSPADM

## 2017-09-18 RX ORDER — INSULIN ASPART 100 [IU]/ML
10 INJECTION, SOLUTION INTRAVENOUS; SUBCUTANEOUS
Status: DISCONTINUED | OUTPATIENT
Start: 2017-09-18 | End: 2017-09-19

## 2017-09-18 RX ORDER — GLUCAGON 1 MG
1 KIT INJECTION
Status: DISCONTINUED | OUTPATIENT
Start: 2017-09-18 | End: 2017-09-19

## 2017-09-18 RX ORDER — INSULIN ASPART 100 [IU]/ML
0-10 INJECTION, SOLUTION INTRAVENOUS; SUBCUTANEOUS
Status: DISCONTINUED | OUTPATIENT
Start: 2017-09-18 | End: 2017-09-19

## 2017-09-18 RX ADMIN — VALSARTAN 320 MG: 160 TABLET, FILM COATED ORAL at 08:09

## 2017-09-18 RX ADMIN — Medication 3 ML: at 10:09

## 2017-09-18 RX ADMIN — CYCLOPHOSPHAMIDE 1320 MG: 2 INJECTION, POWDER, FOR SOLUTION INTRAVENOUS; ORAL at 06:09

## 2017-09-18 RX ADMIN — LEVETIRACETAM 500 MG: 500 TABLET ORAL at 08:09

## 2017-09-18 RX ADMIN — INSULIN ASPART 10 UNITS: 100 INJECTION, SOLUTION INTRAVENOUS; SUBCUTANEOUS at 05:09

## 2017-09-18 RX ADMIN — HEPARIN SODIUM 5000 UNITS: 5000 INJECTION, SOLUTION INTRAVENOUS; SUBCUTANEOUS at 10:09

## 2017-09-18 RX ADMIN — INSULIN ASPART 2 UNITS: 100 INJECTION, SOLUTION INTRAVENOUS; SUBCUTANEOUS at 11:09

## 2017-09-18 RX ADMIN — INSULIN ASPART 6 UNITS: 100 INJECTION, SOLUTION INTRAVENOUS; SUBCUTANEOUS at 10:09

## 2017-09-18 RX ADMIN — SERTRALINE HYDROCHLORIDE 150 MG: 100 TABLET ORAL at 08:09

## 2017-09-18 RX ADMIN — SODIUM CHLORIDE: 0.9 INJECTION, SOLUTION INTRAVENOUS at 05:09

## 2017-09-18 RX ADMIN — ATORVASTATIN CALCIUM 40 MG: 20 TABLET, FILM COATED ORAL at 08:09

## 2017-09-18 RX ADMIN — INSULIN ASPART 4 UNITS: 100 INJECTION, SOLUTION INTRAVENOUS; SUBCUTANEOUS at 05:09

## 2017-09-18 RX ADMIN — Medication 1 CAPSULE: at 08:09

## 2017-09-18 RX ADMIN — SODIUM CHLORIDE 2.5 UNITS/HR: 9 INJECTION, SOLUTION INTRAVENOUS at 08:09

## 2017-09-18 RX ADMIN — HEPARIN SODIUM 5000 UNITS: 5000 INJECTION, SOLUTION INTRAVENOUS; SUBCUTANEOUS at 05:09

## 2017-09-18 RX ADMIN — PREDNISONE 60 MG: 20 TABLET ORAL at 11:09

## 2017-09-18 RX ADMIN — Medication 3 ML: at 02:09

## 2017-09-18 RX ADMIN — VITAMIN D, TAB 1000IU (100/BT) 5000 UNITS: 25 TAB at 08:09

## 2017-09-18 RX ADMIN — POTASSIUM BICARBONATE 50 MEQ: 25 TABLET, EFFERVESCENT ORAL at 11:09

## 2017-09-18 RX ADMIN — DILTIAZEM HYDROCHLORIDE 240 MG: 240 CAPSULE, EXTENDED RELEASE ORAL at 08:09

## 2017-09-18 RX ADMIN — HEPARIN SODIUM 5000 UNITS: 5000 INJECTION, SOLUTION INTRAVENOUS; SUBCUTANEOUS at 01:09

## 2017-09-18 RX ADMIN — ATENOLOL 50 MG: 50 TABLET ORAL at 08:09

## 2017-09-18 RX ADMIN — PANTOPRAZOLE SODIUM 40 MG: 40 TABLET, DELAYED RELEASE ORAL at 08:09

## 2017-09-18 RX ADMIN — LEVETIRACETAM 500 MG: 500 TABLET ORAL at 09:09

## 2017-09-18 RX ADMIN — Medication 3 ML: at 05:09

## 2017-09-18 RX ADMIN — ASPIRIN 81 MG: 81 TABLET, COATED ORAL at 08:09

## 2017-09-18 RX ADMIN — Medication 1 CAPSULE: at 09:09

## 2017-09-18 RX ADMIN — DEXAMETHASONE SODIUM PHOSPHATE: 4 INJECTION, SOLUTION INTRAMUSCULAR; INTRAVENOUS at 05:09

## 2017-09-18 RX ADMIN — SODIUM CHLORIDE 1320 MG: 900 INJECTION, SOLUTION INTRAVENOUS at 06:09

## 2017-09-18 NOTE — ASSESSMENT & PLAN NOTE
-Stroke risk factor.  Recent increased BG 2/2 high dose steroid use.  -Endocrinology following and providing recommendations for BG management.

## 2017-09-18 NOTE — PROGRESS NOTES
cyclophosphamide (CYTOXAN) 600 mg/m2 = 1,320 mg in sodium chloride 0.9% 250 mL chemo infusion        Infusion initiated over 1 hour to R hand PIV with positive blood return. Chemotherapy consent on chart. Drug, 2 pt identifiers, BSA and chemo calculation checked with second certified RN. Instructed pt to call with any problems/discomfort. Educated on need to urinate often and to notify nurse with any urine color changes/hematuria. Verbalized understanding. Call light within reach.

## 2017-09-18 NOTE — SUBJECTIVE & OBJECTIVE
Interval History: Patient seen and examined at bedside. Per patient and wife at bedside, he has had significant improvement in cognition and balance since admission. Denies headaches, fevers, chills, nausea, vomiting, abdominal pain, rashes, arthralgias, myalgias, chest pain.      Current Facility-Administered Medications   Medication Frequency    0.9%  NaCl infusion Continuous    aspirin EC tablet 81 mg Daily    atenolol tablet 50 mg Daily    atorvastatin tablet 40 mg Daily    dextrose 50% injection 12.5 g PRN    dextrose 50% injection 25 g PRN    diltiaZEM 24 hr capsule 240 mg Daily    glucagon (human recombinant) injection 1 mg PRN    glucose chewable tablet 16 g PRN    glucose chewable tablet 24 g PRN    heparin (porcine) injection 5,000 Units Q8H    insulin aspart pen 0-10 Units PRN    insulin regular (Humulin R) 100 Units in sodium chloride 0.9% 100 mL infusion Continuous    labetalol injection 10 mg Q6H PRN    levetiracetam tablet 500 mg BID    omega-3 fatty acids-fish oil 340-1,000 mg capsule 1 capsule BID    ondansetron disintegrating tablet 8 mg Q8H PRN    ondansetron injection 4 mg Q12H PRN    pantoprazole EC tablet 40 mg Daily    potassium bicarbonate disintegrating tablet 50 mEq Daily    predniSONE tablet 60 mg Daily    sertraline tablet 150 mg Daily    sodium chloride 0.9% bolus 500 mL Continuous PRN    sodium chloride 0.9% flush 3 mL Q8H    valsartan tablet 320 mg Daily    vitamin D 1000 units tablet 5,000 Units Daily     Objective:     Vital Signs (Most Recent):  Temp: 97.5 °F (36.4 °C) (09/18/17 0705)  Pulse: 60 (09/18/17 0705)  Resp: 19 (09/18/17 0705)  BP: (!) 164/88 (09/18/17 0705)  SpO2: 100 % (09/18/17 0705)  O2 Device (Oxygen Therapy): CPAP (09/18/17 0705) Vital Signs (24h Range):  Temp:  [96.3 °F (35.7 °C)-97.5 °F (36.4 °C)] 97.5 °F (36.4 °C)  Pulse:  [54-83] 60  Resp:  [18-19] 19  SpO2:  [96 %-100 %] 100 %  BP: (135-164)/(76-88) 164/88     Weight: 97.8 kg (215 lb  11.2 oz) (09/17/17 0400)  Body mass index is 30.95 kg/m².  Body surface area is 2.2 meters squared.      Intake/Output Summary (Last 24 hours) at 09/18/17 1116  Last data filed at 09/18/17 1024   Gross per 24 hour   Intake             2402 ml   Output             1000 ml   Net             1402 ml       Physical Exam   Constitutional: He is oriented to person, place, and time and well-developed, well-nourished, and in no distress.   HENT:   Head: Normocephalic.   Mouth/Throat: No oropharyngeal exudate.   Decrease in pupillary constriction left eye  EOMI   Eyes: EOM are normal. Pupils are equal, round, and reactive to light.   Neck: Normal range of motion. Neck supple.   Cardiovascular: Normal rate, regular rhythm and normal heart sounds.    Pulmonary/Chest: Effort normal and breath sounds normal.   Abdominal: Soft. Bowel sounds are normal. He exhibits no distension.       Right Side Rheumatological Exam     Examination finds the shoulder, elbow, wrist, knee, 1st PIP, 1st MCP, 2nd PIP, 2nd MCP, 3rd PIP, 3rd MCP, 4th PIP, 4th MCP, 5th PIP and 5th MCP normal.    Shoulder Exam   Sensation: normal    Knee Exam   Sensation: normal    Hip Exam   Sensation: normal    Elbow/Wrist Exam   Sensation: normal    Muscle Strength (0-5 scale):  Neck Flexion:  5  Deltoid:  5  Biceps: 5/5   Triceps:  5  : 5/5   Iliopsoas: 5  Quadriceps:  5   Distal Lower Extremity: 5    Left Side Rheumatological Exam     Examination finds the shoulder, elbow, wrist, knee, 1st PIP, 1st MCP, 2nd PIP, 2nd MCP, 3rd PIP, 3rd MCP, 4th PIP, 4th MCP, 5th PIP and 5th MCP normal.    Shoulder Exam   Sensation: normal    Knee Exam   Sensation: normal    Hip Exam   Sensation: normal    Elbow/Wrist Exam   Sensation: normal    Muscle Strength (0-5 scale):  Neck Flexion:  5  Deltoid:  5  Biceps: 5/5   Triceps:  5  :  5/5   Iliopsoas: 5  Quadriceps:  5   Distal Lower Extremity: 5      Lymphadenopathy:     He has no cervical adenopathy.   Neurological: He is  alert and oriented to person, place, and time.   Reflex Scores:       Tricep reflexes are 2+ on the right side and 2+ on the left side.       Bicep reflexes are 2+ on the right side and 2+ on the left side.       Brachioradialis reflexes are 2+ on the right side and 2+ on the left side.       Patellar reflexes are 2+ on the right side and 2+ on the left side.       Achilles reflexes are 2+ on the right side and 2+ on the left side.  Skin: Skin is warm and dry.     Musculoskeletal: Normal range of motion. He exhibits no edema, tenderness or deformity.         Significant Labs:  Results for JEREMIAS LUCAS (MRN [id number]) as of 9/18/2017 11:22   Ref. Range 9/15/2017 04:24 9/16/2017 04:14   WBC Latest Ref Range: 3.90 - 12.70 K/uL 7.06 11.02   RBC Latest Ref Range: 4.60 - 6.20 M/uL 4.15 (L) 4.09 (L)   Hemoglobin Latest Ref Range: 14.0 - 18.0 g/dL 11.5 (L) 11.3 (L)   Hematocrit Latest Ref Range: 40.0 - 54.0 % 31.8 (L) 32.7 (L)   MCV Latest Ref Range: 82 - 98 fL 77 (L) 80 (L)   MCH Latest Ref Range: 27.0 - 31.0 pg 27.7 27.6   MCHC Latest Ref Range: 32.0 - 36.0 g/dL 36.2 (H) 34.6   RDW Latest Ref Range: 11.5 - 14.5 % 15.5 (H) 15.3 (H)   Platelets Latest Ref Range: 150 - 350 K/uL 176 196   MPV Latest Ref Range: 9.2 - 12.9 fL 9.2 9.6   Gran% Latest Ref Range: 38.0 - 73.0 % 67.9 95.7 (H)   Gran # Latest Ref Range: 1.8 - 7.7 K/uL 4.8 10.6 (H)   Lymph% Latest Ref Range: 18.0 - 48.0 % 22.8 3.9 (L)   Lymph # Latest Ref Range: 1.0 - 4.8 K/uL 1.6 0.4 (L)   Mono% Latest Ref Range: 4.0 - 15.0 % 7.8 0.1 (L)   Mono # Latest Ref Range: 0.3 - 1.0 K/uL 0.6 0.0 (L)   Eosinophil% Latest Ref Range: 0.0 - 8.0 % 0.7 0.0   Eos # Latest Ref Range: 0.0 - 0.5 K/uL 0.1 0.0   Basophil% Latest Ref Range: 0.0 - 1.9 % 0.4 0.1   Baso # Latest Ref Range: 0.00 - 0.20 K/uL 0.03 0.01   Protime Latest Ref Range: 9.0 - 12.5 sec 10.8    Coumadin Monitoring INR Latest Ref Range: 0.8 - 1.2  1.0    aPTT Latest Ref Range: 21.0 - 32.0 sec 22.2      Results for SHAYY  JEREMIAS ACEVEDO (MRN [id number]) as of 9/18/2017 11:22   Ref. Range 9/18/2017 04:06   Sodium Latest Ref Range: 136 - 145 mmol/L 140   Potassium Latest Ref Range: 3.5 - 5.1 mmol/L 2.9 (L)   Chloride Latest Ref Range: 95 - 110 mmol/L 105   CO2 Latest Ref Range: 23 - 29 mmol/L 25   Anion Gap Latest Ref Range: 8 - 16 mmol/L 10   BUN, Bld Latest Ref Range: 6 - 20 mg/dL 13   Creatinine Latest Ref Range: 0.5 - 1.4 mg/dL 0.7   eGFR if non African American Latest Ref Range: >60 mL/min/1.73 m^2 >60.0   eGFR if African American Latest Ref Range: >60 mL/min/1.73 m^2 >60.0   Glucose Latest Ref Range: 70 - 110 mg/dL 162 (H)   Calcium Latest Ref Range: 8.7 - 10.5 mg/dL 8.0 (L)   Alkaline Phosphatase Latest Ref Range: 55 - 135 U/L 69   Total Protein Latest Ref Range: 6.0 - 8.4 g/dL 5.8 (L)   Albumin Latest Ref Range: 3.5 - 5.2 g/dL 2.9 (L)   Total Bilirubin Latest Ref Range: 0.1 - 1.0 mg/dL 0.6   AST Latest Ref Range: 10 - 40 U/L 15   ALT Latest Ref Range: 10 - 44 U/L 17     Results for JEREMIAS LUCAS (MRN [id number]) as of 9/18/2017 11:22   Ref. Range 5/25/2017 14:24 5/26/2017 05:56 5/26/2017 05:56 5/26/2017 11:38 6/2/2017 16:50   Cytoplasmic Neutrophilic Ab Latest Ref Range: <1:20 Titer  <1:20 <1:20     Perinuclear (P-ANCA) Latest Ref Range: <1:20 Titer  <1:20 <1:20     Complement (C-3) Latest Ref Range: 50 - 180 mg/dL 111       Complement (C-4) Latest Ref Range: 11 - 44 mg/dL 32       Protein, Serum Latest Ref Range: 6.0 - 8.4 g/dL    4.6 (L)    Albumin grams/dl Latest Ref Range: 3.35 - 5.55 g/dL    2.62 (L)    Alpha-1 grams/dl Latest Ref Range: 0.17 - 0.41 g/dL    0.23    Alpha-2 grams/dl Latest Ref Range: 0.43 - 0.99 g/dL    0.62    Beta grams/dl Latest Ref Range: 0.50 - 1.10 g/dL    0.69    Gamma grams/dl Latest Ref Range: 0.67 - 1.58 g/dL    0.43 (L)    Pathologist Interpretation SPE Unknown    REVIEWED    Cryoglobulin, Qualitative Latest Ref Range: Absent   Absent   Absent   AChR Binding Ab, Serum Latest Ref Range: <=0.02 nmol/L    0.00     AChR Ganglionic Neuronal Ab Latest Ref Range: <=0.02 nmol/L    0.00    NMO Interpretive Comments Unknown    SEE BELOW    PAVAL reflex test added Unknown    None.    Striated Muscle Ab Latest Ref Range: <1:120 titer    Negative       Ref. Range 5/17/2017 14:03 5/25/2017 08:16 5/25/2017 14:24 5/26/2017 05:56   Hep A IgM Unknown       Negative   Hep B C IgM Unknown       Negative   Hepatitis B Surface Ag Unknown       Negative   Hepatitis C Ab Unknown       Negative   West Nile Virus, PCR Latest Ref Range: Negative      Negative     HIV 1/2 Ag/Ab Latest Ref Range: Negative    Negative       RPR Latest Ref Range: Non-reactive  Non-reactive         Lyme Ab Latest Ref Range: <0.90 Index Value   0.03          Ref. Range 9/15/2017 04:34   Specimen UA Unknown Urine, Clean Catch   Color, UA Latest Ref Range: Yellow, Straw, Beti  Yellow   pH, UA Latest Ref Range: 5.0 - 8.0  6.0   Specific Gravity, UA Latest Ref Range: 1.005 - 1.030  1.015   Appearance, UA Latest Ref Range: Clear  Clear   Protein, UA Latest Ref Range: Negative  1+ (A)   Glucose, UA Latest Ref Range: Negative  3+ (A)   Ketones, UA Latest Ref Range: Negative  Negative   Occult Blood UA Latest Ref Range: Negative  1+ (A)   Nitrite, UA Latest Ref Range: Negative  Negative   Urobilinogen, UA Latest Ref Range: <2.0 EU/dL Negative   Bilirubin (UA) Latest Ref Range: Negative  Negative   Leukocytes, UA Latest Ref Range: Negative  Negative   RBC, UA Latest Ref Range: 0 - 4 /hpf 3   WBC, UA Latest Ref Range: 0 - 5 /hpf 0   Bacteria, UA Latest Ref Range: None-Occ /hpf Rare   Yeast, UA Latest Ref Range: None  None   Squam Epithel, UA Latest Units: /hpf 0   Hyaline Casts, UA Latest Ref Range: 0-1/lpf /lpf 0   Microscopic Comment Unknown SEE COMMENT      Ref. Range 5/14/2017 15:14   Color, CSF Latest Ref Range: Colorless  Colorless   Heme Aliquot Latest Units: mL 2.2   Appearance, CSF Latest Ref Range: Clear  Clear   WBC, CSF Latest Ref Range: 0 - 5 /cu mm 4   RBC, CSF  Latest Ref Range: 0 /cu mm 9 (A)   Lymphs, CSF Latest Ref Range: 40 - 80 % 83 (H)   Mono/Macrophage, CSF Latest Ref Range: 15 - 45 % 17   CSF Bands Latest Units: bands 0   Serum Bands Latest Units: bands 0   Olig Bands Interpretation, CSF Latest Ref Range: <4 bands 0   IgG Index, CSF Latest Ref Range: <=0.85  0.57   Albumin, CSF Latest Ref Range: <=27.0 mg/dL 51.4 (H)   IGG/ALBUMIN RATIO, CSF Latest Ref Range: <=0.21  0.13   IgG Synthetic Rate Latest Ref Range: <=12 mg/24 h 5.49   Albumin, Serum Latest Ref Range: 3200 - 4800 mg/dL 3820   IgG, CSF Latest Ref Range: <=8.1 mg/dL 6.6             Significant Imaging:  CT Head (8/30/17):  Impression           No evidence of acute infarction, hemorrhage, mass, or hydrocephalus.     Scattered areas of decreased attenuation throughout the supratentorial white matter which is nonspecific but likely represents chronic microvascular ischemic changes, not significantly changed when compared to prior exam.      Chest xray (8/30/17)  Impression         Mild amount of opacity in the left lower lung zone which may represent atelectasis or pneumonia.      IR Angio (6/1/17):  Impression        Multifocal areas of stenosis involving the intracranial anterior and posterior circulation most notably in the left A2 segment and the superior cerebellar arteries as detailed above. These have an appearance consistent with cerebral vasculitis.  However, multifocal atherosclerotic disease can have a very similar appearance.  Repeat angiography may be useful following a period of treatment if vasculitis is a clinical consideration.             MRI 6/2/17:  Impression          Multifocal areas of intracranial arterial vascular narrowing as described above. Subtle wall thickening with mild postcontrast enhancement favor vasculitis over atherosclerosis. RCVS and dissection essentially excluded. Clinical correlation, with laboratory and CSF analysis suggested.         MRI 9/14/17:     Impression         1. Findings compatible with a small acute lacunar infarct adjacent to the left frontal horn.  Nonspecific enhancement just inferior to this region and extending into the left basal ganglia, as well as within the inferior aspect of the left cerebellum.  No evidence of a focal mass.  2.  Extensive increased signal intensity involving the periventricular white matter compatible with demyelinating disease versus vasculitis.  3.  Clinical correlation and followup recommended.  4.  This reportedly flattened in the EPIC and the corpus callosum medical record system.

## 2017-09-18 NOTE — ASSESSMENT & PLAN NOTE
-Cerebral angiogram 05/2017 c/w cerebral vasculitis vs multifocal atherosclerotic disease.  -Signs of demyelinating disease vs vasculitis on recent imaging. Pt sees Dr. Love, recently taken off chemo 2/2 neutropenia.  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  Will start prednisone 60 mg po qd.  -Seen by Rheum (Dr. Durant) 05/2017 with autoimmune workup negative at that time.  -IP consult to Rheumatology--recommended cyclophosphamide vs rituximab.  Discussed with Dr. Love, decided on cyclophosphamide 600 mg/m2--requesting Hem/Onc consult to assist with dosing.  -Will attempt to set patient up with outpatient infusions per Rheumatology and Dr. Love's recommendation

## 2017-09-18 NOTE — PROGRESS NOTES
[facility]  Rheumatology  Progress Note    Patient Name: Bessy Nunez  MRN: [id number]  Admission Date: 9/14/2017  Hospital Length of Stay: 4 days  Code Status: Full Code   Attending Provider: Edgardo Forrest DO  Primary Care Physician: Edgar Nova MD  Principal Problem: Lacunar stroke of left subthalamic region    Subjective:     HPI: This is a 58yo M with PMH HTN, HLD, poorly controlled DMII, JAYDEN, who presented to ED after being notified about MRI results from 9/14/17 which revealed small L subcortical frontal infarct. Patient with no weakness, dizziness, headaches, sensation loss, changes in vision.   Patient with recent history of confusion, falls, and fatigue which started in May 2017. He was initially admitted from 5/13-5/18 with JAYDEN and diagnosed with MS. MRA brain/neck, MRI brain w/wo contrast showed no vascular occlusion, multiple foci of hyperintensity in deep cerebral periventricular white matter in pattern of demyelinating process. He was given 5 days of IV solumedrol which led to improvement in his symptoms.     On 5/24 he was admitted again with worsening double vision, dizziness, syncope and confusion. EEG 5/25 (2 hr) showed moderate disorganization and slowing c/w nonspecific encephalopathy.  On 5/28 he had waxing and waning mental status, MRI suggestive of active demyelination vs ischemia if multiple areas. Given Cytoxan 6/3 for CNS vasculitis with plan to repeat cytoxan once monthly (for total of 3) and discharged home with home health on prednisone 60 mg daily.   Per wife, patient has been sleeping most days and he has not been able to return to work since May. He has gets confused during normal conversation. He has difficulty completing complex tasks. Responded well to first dose of cyclophosphamide. He did well for about 1 week afterwards. Received 2nd treatment of cyclophosphamide in 8/2017. He was scheduled to receive another treatment but did not due to a low white  count which was seen on blood work last week. Currently denies any skin ulcers, headaches, abdominal pain, fevers, numbness, arthralgias, changes in vision, blood in urine/stool. Has had bladder/bowel incontinence since May.   Rheumatology consulted regarding management options for possible CNS vasculitis. He was last seen by Rheumatology in May 2017. Thorough autoimmune workup this far has been negative including REYMUNDO x 2, ANCA x 2, ACE, acl ab, C3, C4, SPEP, NMO also no clinical sign of systemic vasculitis.    Interval History: Patient seen and examined at bedside. Per patient and wife at bedside, he has had significant improvement in cognition and balance since admission. Denies headaches, fevers, chills, nausea, vomiting, abdominal pain, rashes, arthralgias, myalgias, chest pain.      Current Facility-Administered Medications   Medication Frequency    0.9%  NaCl infusion Continuous    aspirin EC tablet 81 mg Daily    atenolol tablet 50 mg Daily    atorvastatin tablet 40 mg Daily    dextrose 50% injection 12.5 g PRN    dextrose 50% injection 25 g PRN    diltiaZEM 24 hr capsule 240 mg Daily    glucagon (human recombinant) injection 1 mg PRN    glucose chewable tablet 16 g PRN    glucose chewable tablet 24 g PRN    heparin (porcine) injection 5,000 Units Q8H    insulin aspart pen 0-10 Units PRN    insulin regular (Humulin R) 100 Units in sodium chloride 0.9% 100 mL infusion Continuous    labetalol injection 10 mg Q6H PRN    levetiracetam tablet 500 mg BID    omega-3 fatty acids-fish oil 340-1,000 mg capsule 1 capsule BID    ondansetron disintegrating tablet 8 mg Q8H PRN    ondansetron injection 4 mg Q12H PRN    pantoprazole EC tablet 40 mg Daily    potassium bicarbonate disintegrating tablet 50 mEq Daily    predniSONE tablet 60 mg Daily    sertraline tablet 150 mg Daily    sodium chloride 0.9% bolus 500 mL Continuous PRN    sodium chloride 0.9% flush 3 mL Q8H    valsartan tablet 320 mg Daily     vitamin D 1000 units tablet 5,000 Units Daily     Objective:     Vital Signs (Most Recent):  Temp: 97.5 °F (36.4 °C) (09/18/17 0705)  Pulse: 60 (09/18/17 0705)  Resp: 19 (09/18/17 0705)  BP: (!) 164/88 (09/18/17 0705)  SpO2: 100 % (09/18/17 0705)  O2 Device (Oxygen Therapy): CPAP (09/18/17 0705) Vital Signs (24h Range):  Temp:  [96.3 °F (35.7 °C)-97.5 °F (36.4 °C)] 97.5 °F (36.4 °C)  Pulse:  [54-83] 60  Resp:  [18-19] 19  SpO2:  [96 %-100 %] 100 %  BP: (135-164)/(76-88) 164/88     Weight: 97.8 kg (215 lb 11.2 oz) (09/17/17 0400)  Body mass index is 30.95 kg/m².  Body surface area is 2.2 meters squared.      Intake/Output Summary (Last 24 hours) at 09/18/17 1116  Last data filed at 09/18/17 1024   Gross per 24 hour   Intake             2402 ml   Output             1000 ml   Net             1402 ml       Physical Exam   Constitutional: He is oriented to person, place, and time and well-developed, well-nourished, and in no distress.   HENT:   Head: Normocephalic.   Mouth/Throat: No oropharyngeal exudate.   Decrease in pupillary constriction left eye  EOMI   Eyes: EOM are normal. Pupils are equal, round, and reactive to light.   Neck: Normal range of motion. Neck supple.   Cardiovascular: Normal rate, regular rhythm and normal heart sounds.    Pulmonary/Chest: Effort normal and breath sounds normal.   Abdominal: Soft. Bowel sounds are normal. He exhibits no distension.       Right Side Rheumatological Exam     Examination finds the shoulder, elbow, wrist, knee, 1st PIP, 1st MCP, 2nd PIP, 2nd MCP, 3rd PIP, 3rd MCP, 4th PIP, 4th MCP, 5th PIP and 5th MCP normal.    Shoulder Exam   Sensation: normal    Knee Exam   Sensation: normal    Hip Exam   Sensation: normal    Elbow/Wrist Exam   Sensation: normal    Muscle Strength (0-5 scale):  Neck Flexion:  5  Deltoid:  5  Biceps: 5/5   Triceps:  5  : 5/5   Iliopsoas: 5  Quadriceps:  5   Distal Lower Extremity: 5    Left Side Rheumatological Exam     Examination finds the  shoulder, elbow, wrist, knee, 1st PIP, 1st MCP, 2nd PIP, 2nd MCP, 3rd PIP, 3rd MCP, 4th PIP, 4th MCP, 5th PIP and 5th MCP normal.    Shoulder Exam   Sensation: normal    Knee Exam   Sensation: normal    Hip Exam   Sensation: normal    Elbow/Wrist Exam   Sensation: normal    Muscle Strength (0-5 scale):  Neck Flexion:  5  Deltoid:  5  Biceps: 5/5   Triceps:  5  :  5/5   Iliopsoas: 5  Quadriceps:  5   Distal Lower Extremity: 5      Lymphadenopathy:     He has no cervical adenopathy.   Neurological: He is alert and oriented to person, place, and time.   Reflex Scores:       Tricep reflexes are 2+ on the right side and 2+ on the left side.       Bicep reflexes are 2+ on the right side and 2+ on the left side.       Brachioradialis reflexes are 2+ on the right side and 2+ on the left side.       Patellar reflexes are 2+ on the right side and 2+ on the left side.       Achilles reflexes are 2+ on the right side and 2+ on the left side.  Skin: Skin is warm and dry.     Musculoskeletal: Normal range of motion. He exhibits no edema, tenderness or deformity.         Significant Labs:  Results for JEREMIAS LUCAS (MRN [id number]) as of 9/18/2017 11:22   Ref. Range 9/15/2017 04:24 9/16/2017 04:14   WBC Latest Ref Range: 3.90 - 12.70 K/uL 7.06 11.02   RBC Latest Ref Range: 4.60 - 6.20 M/uL 4.15 (L) 4.09 (L)   Hemoglobin Latest Ref Range: 14.0 - 18.0 g/dL 11.5 (L) 11.3 (L)   Hematocrit Latest Ref Range: 40.0 - 54.0 % 31.8 (L) 32.7 (L)   MCV Latest Ref Range: 82 - 98 fL 77 (L) 80 (L)   MCH Latest Ref Range: 27.0 - 31.0 pg 27.7 27.6   MCHC Latest Ref Range: 32.0 - 36.0 g/dL 36.2 (H) 34.6   RDW Latest Ref Range: 11.5 - 14.5 % 15.5 (H) 15.3 (H)   Platelets Latest Ref Range: 150 - 350 K/uL 176 196   MPV Latest Ref Range: 9.2 - 12.9 fL 9.2 9.6   Gran% Latest Ref Range: 38.0 - 73.0 % 67.9 95.7 (H)   Gran # Latest Ref Range: 1.8 - 7.7 K/uL 4.8 10.6 (H)   Lymph% Latest Ref Range: 18.0 - 48.0 % 22.8 3.9 (L)   Lymph # Latest Ref Range: 1.0  - 4.8 K/uL 1.6 0.4 (L)   Mono% Latest Ref Range: 4.0 - 15.0 % 7.8 0.1 (L)   Mono # Latest Ref Range: 0.3 - 1.0 K/uL 0.6 0.0 (L)   Eosinophil% Latest Ref Range: 0.0 - 8.0 % 0.7 0.0   Eos # Latest Ref Range: 0.0 - 0.5 K/uL 0.1 0.0   Basophil% Latest Ref Range: 0.0 - 1.9 % 0.4 0.1   Baso # Latest Ref Range: 0.00 - 0.20 K/uL 0.03 0.01   Protime Latest Ref Range: 9.0 - 12.5 sec 10.8    Coumadin Monitoring INR Latest Ref Range: 0.8 - 1.2  1.0    aPTT Latest Ref Range: 21.0 - 32.0 sec 22.2      Results for JEREMIAS LUCAS (MRN [id number]) as of 9/18/2017 11:22   Ref. Range 9/18/2017 04:06   Sodium Latest Ref Range: 136 - 145 mmol/L 140   Potassium Latest Ref Range: 3.5 - 5.1 mmol/L 2.9 (L)   Chloride Latest Ref Range: 95 - 110 mmol/L 105   CO2 Latest Ref Range: 23 - 29 mmol/L 25   Anion Gap Latest Ref Range: 8 - 16 mmol/L 10   BUN, Bld Latest Ref Range: 6 - 20 mg/dL 13   Creatinine Latest Ref Range: 0.5 - 1.4 mg/dL 0.7   eGFR if non African American Latest Ref Range: >60 mL/min/1.73 m^2 >60.0   eGFR if African American Latest Ref Range: >60 mL/min/1.73 m^2 >60.0   Glucose Latest Ref Range: 70 - 110 mg/dL 162 (H)   Calcium Latest Ref Range: 8.7 - 10.5 mg/dL 8.0 (L)   Alkaline Phosphatase Latest Ref Range: 55 - 135 U/L 69   Total Protein Latest Ref Range: 6.0 - 8.4 g/dL 5.8 (L)   Albumin Latest Ref Range: 3.5 - 5.2 g/dL 2.9 (L)   Total Bilirubin Latest Ref Range: 0.1 - 1.0 mg/dL 0.6   AST Latest Ref Range: 10 - 40 U/L 15   ALT Latest Ref Range: 10 - 44 U/L 17     Results for JEREMIAS LUCAS (MRN [id number]) as of 9/18/2017 11:22   Ref. Range 5/25/2017 14:24 5/26/2017 05:56 5/26/2017 05:56 5/26/2017 11:38 6/2/2017 16:50   Cytoplasmic Neutrophilic Ab Latest Ref Range: <1:20 Titer  <1:20 <1:20     Perinuclear (P-ANCA) Latest Ref Range: <1:20 Titer  <1:20 <1:20     Complement (C-3) Latest Ref Range: 50 - 180 mg/dL 111       Complement (C-4) Latest Ref Range: 11 - 44 mg/dL 32       Protein, Serum Latest Ref Range: 6.0 - 8.4 g/dL    4.6  (L)    Albumin grams/dl Latest Ref Range: 3.35 - 5.55 g/dL    2.62 (L)    Alpha-1 grams/dl Latest Ref Range: 0.17 - 0.41 g/dL    0.23    Alpha-2 grams/dl Latest Ref Range: 0.43 - 0.99 g/dL    0.62    Beta grams/dl Latest Ref Range: 0.50 - 1.10 g/dL    0.69    Gamma grams/dl Latest Ref Range: 0.67 - 1.58 g/dL    0.43 (L)    Pathologist Interpretation SPE Unknown    REVIEWED    Cryoglobulin, Qualitative Latest Ref Range: Absent   Absent   Absent   AChR Binding Ab, Serum Latest Ref Range: <=0.02 nmol/L    0.00    AChR Ganglionic Neuronal Ab Latest Ref Range: <=0.02 nmol/L    0.00    NMO Interpretive Comments Unknown    SEE BELOW    PAVAL reflex test added Unknown    None.    Striated Muscle Ab Latest Ref Range: <1:120 titer    Negative       Ref. Range 5/17/2017 14:03 5/25/2017 08:16 5/25/2017 14:24 5/26/2017 05:56   Hep A IgM Unknown       Negative   Hep B C IgM Unknown       Negative   Hepatitis B Surface Ag Unknown       Negative   Hepatitis C Ab Unknown       Negative   West Nile Virus, PCR Latest Ref Range: Negative      Negative     HIV 1/2 Ag/Ab Latest Ref Range: Negative    Negative       RPR Latest Ref Range: Non-reactive  Non-reactive         Lyme Ab Latest Ref Range: <0.90 Index Value   0.03          Ref. Range 9/15/2017 04:34   Specimen UA Unknown Urine, Clean Catch   Color, UA Latest Ref Range: Yellow, Straw, Beti  Yellow   pH, UA Latest Ref Range: 5.0 - 8.0  6.0   Specific Gravity, UA Latest Ref Range: 1.005 - 1.030  1.015   Appearance, UA Latest Ref Range: Clear  Clear   Protein, UA Latest Ref Range: Negative  1+ (A)   Glucose, UA Latest Ref Range: Negative  3+ (A)   Ketones, UA Latest Ref Range: Negative  Negative   Occult Blood UA Latest Ref Range: Negative  1+ (A)   Nitrite, UA Latest Ref Range: Negative  Negative   Urobilinogen, UA Latest Ref Range: <2.0 EU/dL Negative   Bilirubin (UA) Latest Ref Range: Negative  Negative   Leukocytes, UA Latest Ref Range: Negative  Negative   RBC, UA Latest Ref  Range: 0 - 4 /hpf 3   WBC, UA Latest Ref Range: 0 - 5 /hpf 0   Bacteria, UA Latest Ref Range: None-Occ /hpf Rare   Yeast, UA Latest Ref Range: None  None   Squam Epithel, UA Latest Units: /hpf 0   Hyaline Casts, UA Latest Ref Range: 0-1/lpf /lpf 0   Microscopic Comment Unknown SEE COMMENT      Ref. Range 5/14/2017 15:14   Color, CSF Latest Ref Range: Colorless  Colorless   Heme Aliquot Latest Units: mL 2.2   Appearance, CSF Latest Ref Range: Clear  Clear   WBC, CSF Latest Ref Range: 0 - 5 /cu mm 4   RBC, CSF Latest Ref Range: 0 /cu mm 9 (A)   Lymphs, CSF Latest Ref Range: 40 - 80 % 83 (H)   Mono/Macrophage, CSF Latest Ref Range: 15 - 45 % 17   CSF Bands Latest Units: bands 0   Serum Bands Latest Units: bands 0   Olig Bands Interpretation, CSF Latest Ref Range: <4 bands 0   IgG Index, CSF Latest Ref Range: <=0.85  0.57   Albumin, CSF Latest Ref Range: <=27.0 mg/dL 51.4 (H)   IGG/ALBUMIN RATIO, CSF Latest Ref Range: <=0.21  0.13   IgG Synthetic Rate Latest Ref Range: <=12 mg/24 h 5.49   Albumin, Serum Latest Ref Range: 3200 - 4800 mg/dL 3820   IgG, CSF Latest Ref Range: <=8.1 mg/dL 6.6             Significant Imaging:  CT Head (8/30/17):  Impression           No evidence of acute infarction, hemorrhage, mass, or hydrocephalus.     Scattered areas of decreased attenuation throughout the supratentorial white matter which is nonspecific but likely represents chronic microvascular ischemic changes, not significantly changed when compared to prior exam.      Chest xray (8/30/17)  Impression         Mild amount of opacity in the left lower lung zone which may represent atelectasis or pneumonia.      IR Angio (6/1/17):  Impression        Multifocal areas of stenosis involving the intracranial anterior and posterior circulation most notably in the left A2 segment and the superior cerebellar arteries as detailed above. These have an appearance consistent with cerebral vasculitis.  However, multifocal atherosclerotic disease  can have a very similar appearance.  Repeat angiography may be useful following a period of treatment if vasculitis is a clinical consideration.             MRI 6/2/17:  Impression          Multifocal areas of intracranial arterial vascular narrowing as described above. Subtle wall thickening with mild postcontrast enhancement favor vasculitis over atherosclerosis. RCVS and dissection essentially excluded. Clinical correlation, with laboratory and CSF analysis suggested.         MRI 9/14/17:     Impression        1. Findings compatible with a small acute lacunar infarct adjacent to the left frontal horn.  Nonspecific enhancement just inferior to this region and extending into the left basal ganglia, as well as within the inferior aspect of the left cerebellum.  No evidence of a focal mass.  2.  Extensive increased signal intensity involving the periventricular white matter compatible with demyelinating disease versus vasculitis.  3.  Clinical correlation and followup recommended.  4.  This reportedly flattened in the EPIC and the corpus callosum medical record system.             Assessment/Plan:     CNS vasculitis    This is a 58 yo M with poorly controlled DM2, HLD, HTN, CVA, CHASE, internuclear ophthalmoplegia of the L eye, who presents with findings of small acute lacunar infarct as well as extensive increased signal intensity involving the periventricular white matter compatible with demyelinating disease versus vasculitis. Symptoms include confusion, drowsiness, and at times dizziness and double vision. Had rheumatological work-up which was negative in May. Patient with no systemic systemic symptoms of vasculitis at this time. Tried cyclophosphamide with some improvement in symptoms but has been unable to receive 3rd dose due to neutropenia. Differentials include primary CNS vasculitis vs MS vs atherosclwill erosis. Does not have headaches which is a common feature of CNS vasculitis.     Plan  - given rise in  WBC count, can consider giving next dose of Cyclophosphamide at a lower dose. Discussed with Dr. Love (Neurology) who agrees with plan. Alternative therapy is Rituximab which can also be considered however very limited data on its use.   - If starting on Cyclophosphamide, would check WBC count at week 1 to assess betty and adjust.   - received pulsed-dose steroids with solumedrol 1g for 3 days. Currently on prednisone 60 mg daily.               Zhao Barth MD  Rheumatology  [facility]          Multifocal areas of stenosis involving the intracranial anterior and posterior circulation most notably in the left A2 segment and the superior cerebellar arteries as detailed above. These have an appearance consistent with cerebral vasculitis.  However, multifocal atherosclerotic disease can have a very similar appearance.  Repeat angiography may be useful following a period of treatment if vasculitis is a clinical consideration.    ______________________________________     Electronically signed by resident: PARRISH ALMARAZ MD  Date: 06/01/17  Time: 18:01        First dose 6/3/17 cyclophosphamide 750mg/m2 ordered by Dr. Love tolerated no cbc 1-2 wks afterwards  Lost to follow up  Second dose 8/15/17 800mg/m2  And 8/28/17 WBC ANC 11 8/30/17 20 then 9/11/17:ANC 7500.  Also stopped prednisone 40mg daily from 7/14/ to 8/1/17 abruptly  Admitted 9/15/17 b/o symptoms of confusion, receptive aphasia, ataxia MRI of brain 9/14/17:    1. Findings compatible with a small acute lacunar infarct adjacent to the left frontal horn.  Nonspecific enhancement just inferior to this region and extending into the left basal ganglia, as well as within the inferior aspect of the left cerebellum.  No evidence of a focal mass.  2.  Extensive increased signal intensity involving the periventricular white matter compatible with demyelinating disease versus vasculitis.  3.  Clinical correlation and followup recommended.  4.   This reportedly flattened in the EPIC and the corpus callosum medical record system.        Electronically signed by: ADAN LAKHANI MD  Date: 09/14/17  Time: 12:58     Encounter     View Encounter           Admitted to Vascular Neurology for above.    Dr. Figueredo saw Friday and deferred Rx to Dr. Love but recommended resumption of cyclophosphamide at reduced dose. Dr. Love recommended PLEX. He completed 3 days of Solu-Medrol 1g IV daily, now prednisone 60mg daily. Much improved since admission, less confused, balance improved. No headaches, fever, focal motor weakness.    Would consider resumption of cyclophosphamide IV at reduced dose of  500-600 mg/m2 and check CBC in 7, 10, 14 days. Would plan to complete 3-6 month total course of cyclophosphamide, and then change to maintenance of azathioprine or perhaps mycophenolate  No objection to PLEX but cyclophosphamide likely more important overall would need to be dosed after PLEX if PLEX continued.   Prednisone 60mg daily for the present, then gradually taper  Bactrim-DS M-W-F for PJP  1200mg dietary calcium, vitamin D 1000 units daily  TPMT, HBcAB total, HBsAB  DXA as outpatient

## 2017-09-18 NOTE — ASSESSMENT & PLAN NOTE
This is a 58 yo M with poorly controlled DM2, HLD, HTN, CVA, CHASE, internuclear ophthalmoplegia of the L eye, who presents with findings of small acute lacunar infarct as well as extensive increased signal intensity involving the periventricular white matter compatible with demyelinating disease versus vasculitis. Symptoms include confusion, drowsiness, and at times dizziness and double vision. Had rheumatological work-up which was negative in May. Patient with no systemic systemic symptoms of vasculitis at this time. Tried cyclophosphamide with some improvement in symptoms but has been unable to receive 3rd dose due to neutropenia. Differentials include primary CNS vasculitis vs MS vs atherosclwill erosis. Does not have headaches which is a common feature of CNS vasculitis.     Plan  - given rise in WBC count, can consider giving next dose of Cyclophosphamide at a lower dose. Discussed with Dr. Love (Neurology) who agrees with plan. Alternative therapy is Rituximab which can also be considered however very limited data on its use.   - If starting on Cyclophosphamide, would check WBC count at week 1 to assess betty and adjust.

## 2017-09-18 NOTE — PLAN OF CARE
Problem: Occupational Therapy Goal  Goal: Occupational Therapy Goal  Goals set 9/15 to be addressed for 7 days with expiration date, 9/22:  Patient will increase functional independence with ADLs by performing:    Patient will demonstrate rolling to the right with modified independence.  Not met   Patient will demonstrate rolling to the left with modified independence.   Not met  Patient will demonstrate supine -sit with modified independence.   Not met  Patient will demonstrate stand pivot transfers with modified independence.   Not met  Patient will demonstrate grooming while standing with modified independence.   Not met  Patient will demonstrate upper body dressing with modified independence.   Not met  Patient will demonstrate lower body dressing with modified independence.   Not met  Patient will demonstrate toileting with modified independence.   Not met  Patient's family / caregiver will demonstrate independence and safety with assisting patient with self-care skills and functional mobility.     Not met  Patient and/or patient's family will verbalize understanding of stroke prevention guidelines, personal risk factors and stroke warning signs via teachback method.  Not met          Goals remain appropriate.  COLTON Cano  9/18/2017

## 2017-09-18 NOTE — CONSULTS
[facility]  Hematology/Oncology  Consult Note    Patient Name: Bessy Nunez  MRN: [id number]  Admission Date: 9/14/2017  Hospital Length of Stay: 4 days  Code Status: Full Code   Attending Provider: Edgardo Forrest DO  Consulting Provider: Nataly Chavez MD  Primary Care Physician: Edgar Nova MD  Principal Problem:Lacunar stroke of left subthalamic region    Inpatient consult to Hematology/Oncology  Consult performed by: NATALY CHAVEZ  Consult ordered by: ALINA CORDOVA  Reason for consult: cyclophosphamide for CNS vasculitis        Subjective:     HPI:   57 year-old male with a history of CNS vasculitis was admitted for a lacunar stroke of the left subthalamic region. We have been consulted for assistance with placement of a treatment plan for cyclophosphamide for the treatment of patient's CNS vasculitis.    He has received two doses of cyclophosphamide, once in July and once in August. Side effects from treatment include same-day bowel/bladder incontinence. Otherwise, he has tolerated the therapy relatively well. He was unable to get his third dose as an outpatient because he was admitted for the stroke.    Oncology Treatment Plan:   CYCLOPHOSPHAMIDE    Medications:  Continuous Infusions:   sodium chloride 0.9% 125 mL/hr at 09/18/17 1300    insulin (HUMAN R) infusion (adults) 2.5 Units/hr (09/18/17 0817)    sodium chloride 0.9%       Scheduled Meds:   aspirin  81 mg Oral Daily    atenolol  50 mg Oral Daily    atorvastatin  40 mg Oral Daily    diltiaZEM  240 mg Oral Daily    heparin (porcine)  5,000 Units Subcutaneous Q8H    levetiracetam  500 mg Oral BID    omega-3 fatty acids-fish oil  1 capsule Oral BID    pantoprazole  40 mg Oral Daily    potassium bicarbonate  50 mEq Oral Daily    predniSONE  60 mg Oral Daily    sertraline  150 mg Oral Daily    sodium chloride 0.9%  3 mL Intravenous Q8H    valsartan  320 mg Oral Daily    vitamin D  5,000 Units Oral  Daily     PRN Meds:dextrose 50%, dextrose 50%, glucagon (human recombinant), glucose, glucose, insulin aspart, labetalol, ondansetron, ondansetron, sodium chloride 0.9%     Review of patient's allergies indicates:  No Known Allergies     Past Medical History:   Diagnosis Date    CNS vasculitis 6/2/2017    Follows with Dr. Love By mri.  Several active lesions, many old. 5/13: MRA brain/neck, MRI brain w/wo contrast show no vascular occlusion, multiple foci of hyperintensity in deep cerebral periventricular white matter in pattern of demyelinating process.  5/17: MRI spine performed, no spinal cord lesions. Hospitalization 6/2017. EEG was performed negative for seizures.  Repeat MRI showing new lesion, question whether this was demyelination versus CVA. 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 Findings compatible with a small acute lacunar infarct adjacent to the left frontal horn.  Nonspecific enhancement just inferior to this region and extending into the left basal ganglia, as well as within the inferior aspect of the left cerebellum.  No evidence of a focal mass. 2.  Extensive increased signal intensity involving the periventricular white matter compatible with demyelinating disease versus vasculitis. 3.  Clinical correlation and followup recommended. 4.  This reportedly flattened in the EPIC and the corpus callosum medical record system.    Mixed hyperlipidemia 11/9/2012    NAFLD (nonalcoholic fatty liver disease) 10/11/2013    CHASE (nonalcoholic steatohepatitis)     Obesity     Stroke     Type 2 diabetes mellitus with diabetic polyneuropathy, with long-term current use of insulin 5/14/2017    Type 2 diabetes mellitus with hyperglycemia, with long-term current use of insulin 10/11/2013      Past Surgical History:   Procedure Laterality Date    SKIN CANCER EXCISION       Family History     Problem Relation (Age of Onset)    Cancer Father    Diabetes Maternal Aunt    Heart disease Mother    Heart failure Mother    Hypertension Mother        Social History Main Topics    Smoking status: Never Smoker    Smokeless tobacco: Never Used    Alcohol use No    Drug use: No    Sexual activity: Not on file       Review of Systems   Constitutional: Positive for fatigue.   HENT: Negative for sore throat.    Respiratory: Negative for shortness of breath.    Cardiovascular: Negative for chest pain.   Gastrointestinal: Negative for constipation and diarrhea.   Genitourinary: Negative for dysuria.   Musculoskeletal: Positive for gait problem.   Neurological: Positive for weakness.   Psychiatric/Behavioral: Positive for confusion. Negative for dysphoric mood.     Objective:     Vital Signs (Most Recent):  Temp: 98.4 °F (36.9 °C) (09/18/17 1221)  Pulse: 73 (09/18/17 1221)  Resp: 18 (09/18/17 1221)  BP: 113/69 (09/18/17 1221)  SpO2: 96 % (09/18/17 1221) Vital Signs (24h Range):  Temp:  [96.3 °F (35.7 °C)-98.4 °F (36.9 °C)] 98.4 °F (36.9 °C)  Pulse:  [54-83] 73  Resp:  [18-19] 18  SpO2:  [96 %-100 %] 96 %  BP: (113-164)/(69-88) 113/69     Weight: 97.8 kg (215 lb 11.2 oz)  Body mass index is 30.95 kg/m².  Body surface area is 2.2 meters squared.      Intake/Output Summary (Last 24 hours) at 09/18/17 1341  Last data filed at 09/18/17 1300   Gross per 24 hour   Intake             2892 ml   Output             1000 ml   Net             1892 ml     Physical Exam   Constitutional: He is oriented to person, place, and time. He appears well-developed and well-nourished.   HENT:   Head: Normocephalic and atraumatic.   Eyes: Pupils are equal, round, and reactive to light.   Neck: Normal range of motion. Neck supple.   Cardiovascular: Normal rate.    Pulmonary/Chest: Effort normal and breath sounds normal.   Abdominal: Soft.  Bowel sounds are normal. There is no tenderness.   Neurological: He is alert and oriented to person, place, and time.   Skin: Skin is warm and dry.   Psychiatric: He has a normal mood and affect. His behavior is normal.     Significant Labs:   Labs have been reviewed.    Assessment/Plan:     Active Diagnoses:    Diagnosis Date Noted POA    PRINCIPAL PROBLEM:  Lacunar stroke of left subthalamic region [I63.50] 09/14/2017 Yes    Adverse effect of glucocorticoids and synthetic analogues, initial encounter [T38.0X5A] 09/15/2017 Yes    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] 06/02/2017 Yes    Type 2 diabetes mellitus with diabetic polyneuropathy, with long-term current use of insulin [E11.42, Z79.4] 05/14/2017 Not Applicable    Type 2 diabetes mellitus with hyperglycemia, with long-term current use of insulin [E11.65, Z79.4] 10/11/2013 Not Applicable    JOHN (obstructive sleep apnea) [G47.33] 10/11/2013 Yes    Obesity (BMI 30-39.9) [E66.9] 10/11/2013 Yes    Mixed hyperlipidemia [E78.2] 11/09/2012 Yes    Essential hypertension [I10] 11/09/2012 Yes      Problems Resolved During this Admission:    Diagnosis Date Noted Date Resolved POA    Hypokalemia [E87.6] 06/04/2017 09/17/2017 Yes    Hypomagnesemia [E83.42] 06/04/2017 09/17/2017 Yes     1. CNS vasculitis  - I have placed a treatment plan for cyclophosphamide 600 mg/m2 IV   - The risks and benefits of chemotherapy were discussed, and informed consent was obtained.  - My staff, Dr. Perez, will sign the chemotherapy order.  - I will talk to the charge nurse from oncology floor about logistics of administering the cyclophosphamide on the 7th floor. We may need to send an oncology nurse from the 8th floor to the patient's room to assist with administration of chemotherapy.    Thank you for your consult. I will sign off. Please contact us if you have any additional questions.    Jayesh Chavez MD  Hematology/Oncology  [facility]

## 2017-09-18 NOTE — ASSESSMENT & PLAN NOTE
BG goal 140-180; BG improving on IV insulin infusion. Will convert to IV insulin transition infusion rate of 2.5u/hr given no steroids scheduled today. Change BG monitoring ac/hs/2am and add Novolog SQ correction scale. If diet advances, will resume scheduled Novolog AC for prandial coverage.     -9/15/17 ordered bedside RN to perform insulin pen training for wife as she has never had DM edu/ insulin instruction.   9/18/17 - wife reports never received inpt DM insulin instruction, will reorder    Discharge planning: No d/c plans currently.  given Medicaid limited options - continue Victoza, metformin. Anticipate convert Tresiba to Levemir for insurance coverage. Anticipate adjust Novolog AC dose based on steroid dose.   F/u with CHANCE Steele NP - needs appt if able to see Medicaid pts

## 2017-09-18 NOTE — PT/OT/SLP PROGRESS
Speech Language Pathology  Treatment    Bessy Nunez   MRN: [id number]   Admitting Diagnosis: Lacunar stroke of left subthalamic region    Diet recommendations: Solid Diet Level: Regular  Liquid Diet Level: Nectar Thick    No straws, Small bites/sips and Meds whole 1 at a time  To achieve nectar thick liquid: 4 oz of any liquid to 1 pack of ThickenUp Clear or 6 oz of any liquid to 1 pack of ThickenUp       SLP Treatment Date: 09/18/17  Speech Start Time: 1156     Speech Stop Time: 1207     Speech Total (min): 11 min       TREATMENT BILLABLE MINUTES:  Treatment Swallowing Dysfunction 11    Has the patient been evaluated by SLP for swallowing? : Yes  Keep patient NPO?: No   General Precautions: Standard,            Subjective:  Pt awake; pleasant  Pain/Comfort  Pain Rating 1: 0/10  Pain Rating Post-Intervention 1: 0/10    Objective:    Pt/spouse report some coughing during meals. Pt tolerated 3 cup sips thin liquid with throat clear x1, followed by an additional 3 cup sips thin liquid with subsequent throat clear x1. Pt then tolerated 6 cup sips nectar thick liquid without s/s of airway compromise. Oral phase WNL. Concerns for pt to consistently demonstrate swallow precautions to remain safe on thin liquids. Skilled education provided on aspiration precautions and diet recommendations. Per spouse, pt on clear liquid diet 2/2 glucose; confirmed with pillo Mcnally. Whiteboard updated with diet recommendations/aspiration precautions. No further questions. Recalexander Mcnally RN.                                    Assessment:  Bessy Nunez is a 57 y.o. male with a medical diagnosis of Lacunar stroke of left subthalamic region and presents with cognitve-linguistic impairment, apraxia, aphasia, pharyngeal dysphagia. continued progress towards goals.      Discharge recommendations: Discharge Facility/Level Of Care Needs: home health speech therapy     Goals:    SLP Goals        Problem: SLP Goal    Goal Priority Disciplines  Outcome   SLP Goal     SLP    Description:  Speech Language Pathology Goals  Goals expected to be met by 9/22  1. Patient will tolerate regular consistency/thin liquids po diet with no overt s/s of aspiration.   2. Pt will complete categorization tasks with 80% acc provided min cues to improve cognitive-linguistic skill.   3.Pt will complete sentences with 80% acc provided min cues to improve expressive language.  4. Pt will solve mod level problems with 80% acc provided min cues to improve cognition.   5. Pt will complete high level word finding tasks with 80% acc provided min cues to improve expressive language.                          Plan:   Patient to be seen Therapy Frequency: 5 x/week   Plan of Care expires: 10/14/17  Plan of Care reviewed with: patient, spouse  SLP Follow-up?: Yes  SLP - Next Visit Date: 09/19/17          Marva Madrid M.A. CCC-SLP  Speech Language Pathologist  [phone]  9/18/2017

## 2017-09-18 NOTE — PLAN OF CARE
Problem: Patient Care Overview  Goal: Individualization & Mutuality  Outcome: Ongoing (interventions implemented as appropriate)  POC reviewed with patient and spouse at 2000. AAOx4;responds appropriately. Pt has remained free from falls/injury tonight. No new skin impairments noted. Afebrile. Safety precautions maintained. Continuous telemetry monitoring in progress. Independent with bed mobility. Pt compliant with sugar free clear liquid diet. Remains on continuous regular insulin infusion with q2 hr blood glucose monitoring. Blood glucose range this shift maintained between 139-155 thus far. CPAP use overnight. Rested well. Using urinal more tonight but continues to require assist with toileting. Wife to bedside overnight.

## 2017-09-18 NOTE — PROGRESS NOTES
"[facility]  Endocrinology  Progress Note    Admit Date: 9/14/2017     Reason for Consult: Management of T2DM, Hyperglycemia     Diabetes diagnosis year: 9706-7370    Home Diabetes Medications:  Victoza 1.8mg QD, metformin BID, Levemir flextouch 20 units PM, Novolog flextouch 20 units AC  Lab Results   Component Value Date    HGBA1C 10.8 (H) 09/15/2017     How often checking glucose at home? 1-3 x day   BG readings on regimen: 325-350s  Hypoglycemia on the regimen?  No  Missed doses on regimen?  None recently as wife has been assisting with BG monitoring and insulin administration since 5/2017.     Diabetes Complications include:   Hyperglycemia    Complicating diabetes co morbidities: Glucocorticoid use  and Dementia    HPI: Patient is a 57 y.o. male with a diagnosis of T2DM based on bloodwork. He is managed by CHANCE Steele NP on [facility]. Last seen 3/2017 when converted from Bydureon to Victoza and Levemir to Tresiba. Tresiba is NOT covered by insurance. He is now receiving Medicaid.  Never hospitalized r/t DM.     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 He underwent chemo with steroid therapy since May 2017 which contributed to extreme BG elevations in 300 range. He has been confused, unsteady, frequent falls at home, fatigue "sleeps up to 20 hours a day".  He was recently admitted with AMS. MRI revealed CVA. [facility],  on admit. Started on low dose MDI. Plans for Solumedrol 1000mg IV x 3 days (9/15 - 9/18).  Endocrine consulted for BG mgmt.     Interval HPI:   Overnight events: BG reasonably controlled on IV insulin infusion protocol, requiring 2.9u/hr for past several hours. Completed Solumedrol 1000mg IV pulse x3 yesterday, prednisone 60mg po today; plan for line placement and PLEX today. He remains confused at times.   Eating:   <25% of SF clears but reports hungry, would like solid food  Nausea: No  Hypoglycemia and intervention: No  Fever: No  TPN and/or TF: " "No  BP (!) 164/88 (BP Location: Right arm, Patient Position: Lying)   Pulse 60   Temp 97.5 °F (36.4 °C) (Axillary)   Resp 19   Ht 5' 10" (1.778 m)   Wt 97.8 kg (215 lb 11.2 oz)   SpO2 100%   BMI 30.95 kg/m²       Labs Reviewed and Include      Recent Labs  Lab 09/18/17  0406   *   CALCIUM 8.0*   ALBUMIN 2.9*   PROT 5.8*      K 2.9*   CO2 25      BUN 13   CREATININE 0.7   ALKPHOS 69   ALT 17   AST 15   BILITOT 0.6     Lab Results   Component Value Date    WBC 11.02 09/16/2017    HGB 11.3 (L) 09/16/2017    HCT 32.7 (L) 09/16/2017    MCV 80 (L) 09/16/2017     09/16/2017       Recent Labs  Lab 09/11/17  1135   TSH 1.799     Lab Results   Component Value Date    HGBA1C 10.8 (H) 09/15/2017       Nutritional status:   Body mass index is 30.95 kg/m².  Lab Results   Component Value Date    ALBUMIN 2.9 (L) 09/18/2017    ALBUMIN 2.9 (L) 09/17/2017    ALBUMIN 2.9 (L) 09/17/2017     No results found for: PREALBUMIN    Estimated Creatinine Clearance: 133.2 mL/min (based on SCr of 0.7 mg/dL).    Accu-Checks  Recent Labs      09/17/17   1227  09/17/17   1426  09/17/17   1623  09/17/17   1822  09/17/17   2106  09/17/17   2246  09/18/17   0038  09/18/17   0243  09/18/17   0437  09/18/17   0641   POCTGLUCOSE  189*  206*  164*  144*  139*  147*  141*  155*  167*  165*       Hyperglycemia/Diabetes Medications: Antihyperglycemics     Start     Stop Route Frequency Ordered    09/18/17 0753  insulin regular (Humulin R) 100 Units in sodium chloride 0.9% 100 mL infusion      -- IV Continuous 09/18/17 0754          ASSESSMENT and PLAN    * Lacunar stroke of left subthalamic region    Seen on MRI  avoid hypoglycemia        Type 2 diabetes mellitus with hyperglycemia, with long-term current use of insulin    BG goal 140-180; BG improving on IV insulin infusion. Will convert to IV insulin transition infusion rate of 2.5u/hr given no steroids scheduled today. Change BG monitoring ac/hs/2am and add Novolog SQ " correction scale. If diet advances, will resume scheduled Novolog AC for prandial coverage.     -9/15/17 ordered bedside RN to perform insulin pen training for wife as she has never had DM edu/ insulin instruction.   9/18/17 - wife reports never received inpt DM insulin instruction, will reorder    Discharge planning: No d/c plans currently.  given Medicaid limited options - continue Victoza, metformin. Anticipate convert Tresiba to Levemir for insurance coverage. Anticipate adjust Novolog AC dose based on steroid dose.   F/u with CHANCE Steele NP - needs appt if able to see Medicaid pts          CNS vasculitis    sees Dr. Love outpatient and was taken off of chemotherapy due to decreased WBC count.    - avoid hypoglycemia          Adverse effect of glucocorticoids and synthetic analogues, initial encounter    Solumedrol 1000mg IV QD x 3 9/15/17 - 9/17/17, prednisone 60mg QD 9/18/17  Anticipate high in BG readings          Obesity (BMI 30-39.9)    Body mass index is 30.95 kg/m².  may contribute to insulin resistance          NAFLD (nonalcoholic fatty liver disease)    Lab Results   Component Value Date    ALT 17 09/18/2017    AST 15 09/18/2017    ALKPHOS 69 09/18/2017    BILITOT 0.6 09/18/2017     Optimize DM control          JOHN (obstructive sleep apnea)    If uncontrolled, may contribute to insulin resistance  Wearing CPAP inpt              GABRIEL Humphrey,ANP-C  Endocrinology  [facility]

## 2017-09-18 NOTE — PT/OT/SLP PROGRESS
"Occupational Therapy  Treatment    Bessy Nunez   MRN: [id number]   Admitting Diagnosis: Lacunar stroke of left subthalamic region    OT Date of Treatment: 09/18/17   OT Start Time: 0940  OT Stop Time: 1020  OT Total Time (min): 40 min    Billable Minutes:  Self Care/Home Management 16 and Cognitive Retraining 24    General Precautions: Standard, aspiration, fall, seizure  Orthopedic Precautions: N/A  Braces: N/A    Do you have any cultural, spiritual, Sikhism conflicts, given your current situation?: Amish    Subjective:  Communicated with nurse prior to session.  Patient:  "I am waiting for the doctors to come.  I think I am going to start a new medicine regimen."   Pain/Comfort  Pain Rating 1: 0/10  Pain Rating Post-Intervention 1: 0/10    Objective:  Patient found with: telemetry, SCD, peripheral IV  Wife present at the beginning of the session.      Functional Mobility:  Bed Mobility:  Rolling/Turning to Left: Supervision  Rolling/Turning Right: Supervision  Scooting/Bridging: Stand by Assistance  Supine to Sit: Contact Guard Assistance (without use of bedrail)  Sit to Supine: Supervision    Transfers:   Sit <> Stand Assistance: Stand By Assistance  Sit <> Stand Assistive Device: No Assistive Device  Bed <> Chair Technique: Stand Pivot  Bed <> Chair Transfer Assistance:  (Varies from SBA-CGA)    Activities of Daily Living:  UE Dressing Level of Assistance: Stand by assistance  LE Dressing Level of Assistance: Stand by assistance  Grooming Position: Standing  Grooming Level of Assistance: Stand by assistance  Toileting Where Assessed: Toilet (incontinent episode)  Toileting Level of Assistance: Minimum assistance     Additional Treatment:   Patient instructed on need to call for assistance for toileting needs and when getting up.  Continued education, patient/ family training recommended. Patient alert and oriented x 3; able to follow 4/4 one step commands.  Patient attentive and interactive throughout the " "session.  Patient able to recall 3/3 words following 2, 5 and 7 minutes.  Able to identify 4/4 problem solving scenarios appropriately.  Core stabilization ex performed while standing; min assist required.  Patient's functional status and disposition recommendation discussed with patient /wife and stroke team.  White board updated in patient's room.  OT asked if there were any other questions; patient had no further questions.    Trail Making Test Part A:  50 seconds  Average: 29 seconds  Deficient: > 78 seconds     Trail Making Test Part B:  >300 seconds  Average: 75 seconds  Deficient:  > 273 seconds    AM-PAC 6 CLICK ADL   How much help from another person does this patient currently need?   1 = Unable, Total/Dependent Assistance  2 = A lot, Maximum/Moderate Assistance  3 = A little, Minimum/Contact Guard/Supervision  4 = None, Modified [place]/Independent    Putting on and taking off regular lower body clothing? : 3  Bathing (including washing, rinsing, drying)?: 3  Toileting, which includes using toilet, bedpan, or urinal? : 3  Putting on and taking off regular upper body clothing?: 3  Taking care of personal grooming such as brushing teeth?: 3  Eating meals?: 3  Total Score: 18     AM-PAC Raw Score CMS "G-Code Modifier Level of Impairment Assistance   6 % Total / Unable   7 - 8 CM 80 - 100% Maximal Assist   9-13 CL 60 - 80% Moderate Assist   14 - 19 CK 40 - 60% Moderate Assist   20 - 22 CJ 20 - 40% Minimal Assist   23 CI 1-20% SBA / CGA   24 CH 0% Independent/ Mod I       Patient left supine with all lines intact, call button in reach and bed alarm on    ASSESSMENT:  Bessy Nunez is a 57 y.o. male with a medical diagnosis of left basal ganglia stroke and presents with performance deficits of physical skills including impaired balance, mobility, and endurance; demonstrating performance deficits of cognitive skills including impaired  attention, perception, understanding, problem solving, " sequencing and memory all resulting in inability organizing occupational performance in a timely and safe manner.  These performance deficits have resulted in activity limitations including but not limited to:  bed mobility, transfers, ascending/ descending stairs, walking short and long distances, walking around obstacles, transitional movement patterns (kneeling, bending); eating, upper body dressing, lower body dressing, brushing teeth, toileting, bathing, and carrying objects.   Patient's role as , father and independent caretaker for self has been affected. Patient will benefit from skilled OT services to maximize level of independence with self-care skills and functional mobility.  Will benefit from HH.    Rehab identified problem list/impairments: Rehab identified problem list/impairments: weakness, gait instability, impaired endurance, impaired balance, impaired self care skills, impaired functional mobilty, decreased coordination, decreased upper extremity function, decreased lower extremity function, decreased safety awareness, impaired coordination, impaired cognition    Rehab potential is good.    Activity tolerance: Good    Discharge recommendations: Discharge Facility/Level Of Care Needs: home health OT     Barriers to discharge: Barriers to Discharge: None    Equipment recommendations: none     GOALS:    Occupational Therapy Goals        Problem: Occupational Therapy Goal    Goal Priority Disciplines Outcome Interventions   Occupational Therapy Goal     OT, PT/OT     Description:  Goals set 9/15 to be addressed for 7 days with expiration date, 9/22:  Patient will increase functional independence with ADLs by performing:    Patient will demonstrate rolling to the right with modified independence.  Not met   Patient will demonstrate rolling to the left with modified independence.   Not met  Patient will demonstrate supine -sit with modified independence.   Not met  Patient will demonstrate stand  pivot transfers with modified independence.   Not met  Patient will demonstrate grooming while standing with modified independence.   Not met  Patient will demonstrate upper body dressing with modified independence.   Not met  Patient will demonstrate lower body dressing with modified independence.   Not met  Patient will demonstrate toileting with modified independence.   Not met  Patient's family / caregiver will demonstrate independence and safety with assisting patient with self-care skills and functional mobility.     Not met  Patient and/or patient's family will verbalize understanding of stroke prevention guidelines, personal risk factors and stroke warning signs via teachback method.  Not met                           Plan:  Patient to be seen 6 x/week to address the above listed problems via self-care/home management, therapeutic activities, therapeutic exercises, neuromuscular re-education, cognitive retraining, sensory integration  Plan of Care expires: 10/13/17  Plan of Care reviewed with: patient, spouse         COLTON Chacko  09/18/2017

## 2017-09-18 NOTE — PLAN OF CARE
Problem: Physical Therapy Goal  Goal: Physical Therapy Goal  Goals to be met by: 2017     Patient will increase functional independence with mobility by performin. Supine to sit with Modified [place]  2. Sit to supine with Modified [place]  3. Sit to stand transfer with Supervision  4. Bed to chair transfer with Supervision without AD  5. Gait  x 300 feet with Supervision using Single-point Cane .   6. Lower extremity exercise program x20 reps per handout, with independence           Goals remain appropriate.     Inés Alfonso, PTA.  2017

## 2017-09-18 NOTE — SUBJECTIVE & OBJECTIVE
"Interval HPI:   Overnight events: BG reasonably controlled on IV insulin infusion protocol, requiring 2.9u/hr for past several hours. Completed Solumedrol 1000mg IV pulse x3 yesterday, prednisone 60mg po today; plan for line placement and PLEX today. He remains confused at times.   Eating:   <25% of SF clears but reports hungry, would like solid food  Nausea: No  Hypoglycemia and intervention: No  Fever: No  TPN and/or TF: No  BP (!) 164/88 (BP Location: Right arm, Patient Position: Lying)   Pulse 60   Temp 97.5 °F (36.4 °C) (Axillary)   Resp 19   Ht 5' 10" (1.778 m)   Wt 97.8 kg (215 lb 11.2 oz)   SpO2 100%   BMI 30.95 kg/m²     Labs Reviewed and Include      Recent Labs  Lab 09/18/17  0406   *   CALCIUM 8.0*   ALBUMIN 2.9*   PROT 5.8*      K 2.9*   CO2 25      BUN 13   CREATININE 0.7   ALKPHOS 69   ALT 17   AST 15   BILITOT 0.6     Lab Results   Component Value Date    WBC 11.02 09/16/2017    HGB 11.3 (L) 09/16/2017    HCT 32.7 (L) 09/16/2017    MCV 80 (L) 09/16/2017     09/16/2017       Recent Labs  Lab 09/11/17  1135   TSH 1.799     Lab Results   Component Value Date    HGBA1C 10.8 (H) 09/15/2017       Nutritional status:   Body mass index is 30.95 kg/m².  Lab Results   Component Value Date    ALBUMIN 2.9 (L) 09/18/2017    ALBUMIN 2.9 (L) 09/17/2017    ALBUMIN 2.9 (L) 09/17/2017     No results found for: PREALBUMIN    Estimated Creatinine Clearance: 133.2 mL/min (based on SCr of 0.7 mg/dL).    Accu-Checks  Recent Labs      09/17/17   1227  09/17/17   1426  09/17/17   1623  09/17/17   1822  09/17/17   2106  09/17/17   2246  09/18/17   0038  09/18/17   0243  09/18/17   0437  09/18/17   0641   POCTGLUCOSE  189*  206*  164*  144*  139*  147*  141*  155*  167*  165*       Hyperglycemia/Diabetes Medications: Antihyperglycemics     Start     Stop Route Frequency Ordered    09/18/17 6856  insulin regular (Humulin R) 100 Units in sodium chloride 0.9% 100 mL infusion      -- IV Continuous " 09/18/17 0751

## 2017-09-18 NOTE — PLAN OF CARE
Problem: Patient Care Overview  Goal: Plan of Care Review  Outcome: Ongoing (interventions implemented as appropriate)  POC reviewed with pt and family at 1515. Pt verbalized understanding. Questions and concerns addressed. Pt progressing toward goals. Will continue to monitor. See flowsheets for full assessment and VS info.

## 2017-09-18 NOTE — PT/OT/SLP PROGRESS
"Physical Therapy  Treatment    Bessy Nunez   MRN: [id number]   Admitting Diagnosis: Lacunar stroke of left subthalamic region    PT Received On: 09/18/17  PT Start Time: 1208     PT Stop Time: 1232    PT Total Time (min): 24 min       Billable Minutes:  Gait Training 14 and Therapeutic Activity 10    Treatment Type: Treatment  PT/PTA: PTA     PTA Visit Number: 1       General Precautions: Standard, aspiration, fall, seizure, nectar thick  Orthopedic Precautions: N/A   Braces: N/A         Subjective:  Communicated with RN (Emilie) prior to session.  Pt agreeable to PT session. "I don't have to go upstairs.  My bedroom and bathroom are downstairs"      Pain/Comfort  Pain Rating 1: 0/10  Pain Rating Post-Intervention 1: 0/10    Objective:   Patient found with: peripheral IV, SCD, telemetry (Pt found sup in bed with wife present in the room, however, she leaves at beginning of session)   2 attempts for tx session 2* ST present for tx session      FUNCTIONAL MOBILITY    Bed Mobility (with vc's for sequencing and safe technique of functional mobility):        Sup > sit at the EOB with sup from L side lying        Sit > sup with sup    Transfers  (vc's for hand placement and safety of task)       Sit < > stand from EOB with SC requiring SBA for safety         Gait        Distance: 300ft        Assistive Device: SC       Assistance Required: SBA with vc's for justin and safety, especially when turning       Gait Deviations: Pt demonstrates increased justin, decreased B step length,                                  decreased stride length and decreased toe-to-floor clearance      THERAPEUTIC ACTIVITIES     SITTING (5 min)       Pt tolerates sitting at the EOB with sup A for safety     STANDING        Pt tolerates standing with SC requiring SBA for safety with vc's      Education:  Education provided to pt regarding: safety at home and use of SC. Verbalized understanding.     Whiteboard updated with correct mobility " information. RN/PCT notified.  Pt safe to amb with RN/PCT. Use SC or no AD & 1 person assist.      AM-PAC 6 CLICK MOBILITY  How much help from another person does this patient currently need?   1 = Unable, Total/Dependent Assistance  2 = A lot, Maximum/Moderate Assistance  3 = A little, Minimum/Contact Guard/Supervision  4 = None, Modified [place]/Independent    Turning over in bed (including adjusting bedclothes, sheets and blankets)?: 4  Sitting down on and standing up from a chair with arms (e.g., wheelchair, bedside commode, etc.): 4  Moving from lying on back to sitting on the side of the bed?: 4  Moving to and from a bed to a chair (including a wheelchair)?: 4  Need to walk in hospital room?: 3  Climbing 3-5 steps with a railing?: 3  Total Score: 22    AM-PAC Raw Score CMS G-Code Modifier Level of Impairment Assistance   6 % Total / Unable   7 - 9 CM 80 - 100% Maximal Assist   10 - 14 CL 60 - 80% Moderate Assist   15 - 19 CK 40 - 60% Moderate Assist   20 - 22 CJ 20 - 40% Minimal Assist   23 CI 1-20% SBA / CGA   24 CH 0% Independent/ Mod I     Patient left supine with all lines intact, call button in reach, bed alarm on, RN  notified and family not present.    Assessment:  Bessy Nunez is a 57 y.o. male with a medical diagnosis of Lacunar stroke of left subthalamic region and presents with minor instability during gait, however, no LOB requiring SBA and vc's for safety. Pt will cont to benefit from skilled PT intervention to address deficits and improve functional mobility.     Rehab identified problem list/impairments: Rehab identified problem list/impairments: weakness, impaired endurance, impaired self care skills, gait instability, impaired balance, impaired cognition, decreased coordination, decreased safety awareness    Rehab potential is good.    Activity tolerance: Good    Discharge recommendations: Discharge Facility/Level Of Care Needs: home health PT     Barriers to discharge: Barriers  to Discharge: None    Equipment recommendations: Equipment Needed After Discharge: cane, straight     GOALS:    Physical Therapy Goals        Problem: Physical Therapy Goal    Goal Priority Disciplines Outcome Goal Variances Interventions   Physical Therapy Goal     PT/OT, PT Ongoing (interventions implemented as appropriate)     Description:  Goals to be met by: 2017     Patient will increase functional independence with mobility by performin. Supine to sit with Modified [place]  2. Sit to supine with Modified [place]  3. Sit to stand transfer with Supervision  4. Bed to chair transfer with Supervision without AD  5. Gait  x 300 feet with Supervision using Single-point Cane .   6. Lower extremity exercise program x20 reps per handout, with independence                      PLAN:    Patient to be seen 6 x/week  to address the above listed problems via gait training, therapeutic activities, therapeutic exercises, neuromuscular re-education  Plan of Care expires: 10/15/17  Plan of Care reviewed with: patient         Inés ESCAMILLA Kaden, PTA  2017

## 2017-09-18 NOTE — SUBJECTIVE & OBJECTIVE
Neurologic Chief Complaint: Acute encephalopathy, cognitive deficits    Subjective:     Interval History: Patient is seen for follow-up neurological assessment and treatment recommendations.  Patient has no complaints this am.  Patient and wife both note continued improvement.  Discussed plan with Rheumatology this am who have spoken to Dr. Love and agreed to start the patient on cyclophosphamide today.  Requesting Hem/Onc consult to assist with dosing of cyclophosphamide and will discuss if patient can get infusions outpatient.    HPI, Past Medical, Family, and Social History remains the same as documented in the initial encounter.     Review of Systems   Constitutional: Negative for chills and fever.   Eyes: Negative for photophobia and visual disturbance.   Respiratory: Negative for cough and shortness of breath.    Cardiovascular: Negative for chest pain and palpitations.   Musculoskeletal: Negative for arthralgias and myalgias.   Skin: Negative for rash and wound.   Neurological: Positive for dizziness. Negative for speech difficulty, weakness and numbness.   Hematological: Negative for adenopathy. Does not bruise/bleed easily.   Psychiatric/Behavioral: Positive for confusion and decreased concentration. Negative for agitation.     Scheduled Meds:   aspirin  81 mg Oral Daily    atenolol  50 mg Oral Daily    atorvastatin  40 mg Oral Daily    diltiaZEM  240 mg Oral Daily    heparin (porcine)  5,000 Units Subcutaneous Q8H    levetiracetam  500 mg Oral BID    omega-3 fatty acids-fish oil  1 capsule Oral BID    pantoprazole  40 mg Oral Daily    potassium bicarbonate  50 mEq Oral Daily    predniSONE  60 mg Oral Daily    sertraline  150 mg Oral Daily    sodium chloride 0.9%  3 mL Intravenous Q8H    valsartan  320 mg Oral Daily    vitamin D  5,000 Units Oral Daily     Continuous Infusions:   sodium chloride 0.9% 125 mL/hr at 09/18/17 1300    insulin (HUMAN R) infusion (adults) 2.5 Units/hr (09/18/17  0817)    sodium chloride 0.9%       PRN Meds:dextrose 50%, dextrose 50%, glucagon (human recombinant), glucose, glucose, insulin aspart, labetalol, ondansetron, ondansetron, sodium chloride 0.9%    Objective:     Vital Signs (Most Recent):  Temp: 98.4 °F (36.9 °C) (09/18/17 1221)  Pulse: 73 (09/18/17 1221)  Resp: 18 (09/18/17 1221)  BP: 113/69 (09/18/17 1221)  SpO2: 96 % (09/18/17 1221)  BP Location: Right arm    Vital Signs Range (Last 24H):  Temp:  [96.3 °F (35.7 °C)-98.4 °F (36.9 °C)]   Pulse:  [54-83]   Resp:  [18-19]   BP: (113-164)/(69-88)   SpO2:  [96 %-100 %]   BP Location: Right arm    Physical Exam  General:  Well-developed, obese, nad  HEENT:  NCAT, PERRL, EOMI with improved L LR movement on leftward gaze.    Neck:  Supple, no palpable lad, no apparent JVD  Resp:  Symmetric expansion, no increased wob  CVS:  Trace LE edema.  Peripheral pulses 2+ (radial, posterior tibial)  GI:  Abd obese, soft, non-distended, non-tender to palpation  Neurological Exam:   Mental Status:  AAOx3.  Speech, thought content appropriate.  Recent recall 0/3, remote recall intact per questions about work.  Cranial Nerves:  VFs full.  PERRL, EOMI--improved L LR movement. Facial movement, sensation intact and symmetric.  Palate raise symmetric, tongue protrudes midline.  Trapezius/SCM 5/5.  Motor:  Normal bulk and tone.  Strength diffusely 5/5.  Sensory:  Intact to light touch and temperature at all extremities, no inattention.  Reflexes:  Biceps, brachioradialis, patellar 2+.  No ankle clonus.  Downgoing toe bilaterally.  Coordination:  FNF, ALONSO, HTS intact, symmetric.  Gait:  No imbalance, normal stride length, no shuffling, normal posture.  Mild sway on Romberg but maintains balance.    NIH Stroke Scale:  Interval: 7 days or at discharge (whichever comes first)  Level of Consciousness: 0 - alert  LOC Questions: 0 - answers both correctly  LOC Commands: 0 - performs both correctly  Best Gaze: 0 - normal  Visual: 0 - no visual  loss  Facial Palsy: 0 - normal  Motor Left Arm: 0 - no drift  Motor Right Arm: 0 - no drift  Motor Left Le - no drift  Motor Right Le - no drift  Limb Ataxia: 0 - absent  Sensory: 0 - normal  Best Language: 0 - no aphasia  Dysarthria: 0 - normal articulation  Extinction and Inattention: 0 - no neglect  NIH Stroke Scale Total: 0      Laboratory:  CMP:     Recent Labs  Lab 17  0406   CALCIUM 8.0*   ALBUMIN 2.9*   PROT 5.8*      K 2.9*   CO2 25      BUN 13   CREATININE 0.7   ALKPHOS 69   ALT 17   AST 15   BILITOT 0.6     CBC:     Recent Labs  Lab 17  0414   WBC 11.02   RBC 4.09*   HGB 11.3*   HCT 32.7*      MCV 80*   MCH 27.6   MCHC 34.6     Lipid Panel:     Recent Labs  Lab 09/15/17  0819   CHOL 190   LDLCALC 69.2   HDL 51   TRIG 349*     Coagulation:     Recent Labs  Lab 09/15/17  0424   INR 1.0   APTT 22.2     Hgb A1C:     Recent Labs  Lab 09/15/17  0046   HGBA1C 10.8*     TSH:   No results for input(s): TSH in the last 168 hours.  Diagnostic Results:  17 MRI Brain w/ w/o contrast:  1. Findings compatible with a small acute lacunar infarct adjacent to the left frontal horn.  Nonspecific enhancement just inferior to this region and extending into the left basal ganglia, as well as within the inferior aspect of the left cerebellum.  No evidence of a focal mass.  2.  Extensive increased signal intensity involving the periventricular white matter compatible with demyelinating disease versus vasculitis.  3.  Clinical correlation and followup recommended.  4.  This reportedly flattened in the EPIC and the corpus callosum medical record system.

## 2017-09-18 NOTE — PROGRESS NOTES
[facility]  Vascular Neurology  Comprehensive Stroke Center  Progress Note    Assessment/Plan:     56 yo M with PMHx of HTN, HLD, DM II, CNS vasculitis--not currently on therapy, discharged in June 2017 for MS vs cardio-embolic vs watershade stroke, recently admitted and discharged for AMS presented to hospital as a transfer from [facility] for evaluation of acute stroke revealed on outpatient MRI imaging. Completed 3 d IV solumedrol, Rheumatology and Dr. Love recommend starting cyclophosphamide 600 mg/m2 with Hem/Onc consulted for assistance with infusion regimen and getting patient into outpatient infusion center for regular treatments.    * Lacunar stroke of left subthalamic region    57 y.o. male with significant past medical history of HTN, HLD, DM II, CNS vasculitis (not currently on therapy), discharged in June 2017 for MS vs cardio-embolic vs watershade stroke. Cerebral angiogram in May 2017 consistent with cerebral vasculitis vs multifocal atherosclerotic disease. Recently admitted and discharged for AMS. Pt presented to hospital as a transfer from [facility] for evaluation of acute stroke revealed on outpatient MRI imaging.    - Antithrombotics for secondary stroke prevention: Antiplatelets: ASA 81 mg po qd  - Statins for secondary stroke prevention, HLD: Atorvastatin 40 mg po qd  - Aggressive risk factor modification: HTN, DM II, HLD, Obesity  - Rehab Efforts: PT/OT/SLP  - VTE Prophylaxis: Heparin 5000 U q8h      CNS vasculitis    -Cerebral angiogram 05/2017 c/w cerebral vasculitis vs multifocal atherosclerotic disease.  -Signs of demyelinating disease vs vasculitis on recent imaging. Pt sees Dr. Love, recently taken off chemo 2/2 neutropenia.  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  Will start prednisone 60 mg po qd.  -Seen by Rheum (Dr. Durant) 05/2017 with autoimmune workup negative at that time.  -IP consult to  Rheumatology--recommended cyclophosphamide vs rituximab.  Discussed with Dr. Love, decided on cyclophosphamide 600 mg/m2--requesting Hem/Onc consult to assist with dosing.  -Will attempt to set patient up with outpatient infusions per Rheumatology and Dr. Love's recommendation      JOHN (obstructive sleep apnea)    -CPAP qHS, tolerating well currently      Type 2 diabetes mellitus with hyperglycemia, with long-term current use of insulin    -Stroke risk factor.  Recent increased BG 2/2 high dose steroid use.  -Endocrinology following and providing recommendations for BG management.      Essential hypertension    -Stroke risk factor.  Recommend SBP<180.  Continue home meds.      Mixed hyperlipidemia    -Stroke risk factor.  LDL 69.2, continue home dose atorvastatin.     Neurologic Chief Complaint: Acute encephalopathy, cognitive deficits    Subjective:     Interval History: Patient is seen for follow-up neurological assessment and treatment recommendations.  Patient has no complaints this am.  Patient and wife both note continued improvement.  Discussed plan with Rheumatology this am who have spoken to Dr. Love and agreed to start the patient on cyclophosphamide today.  Requesting Hem/Onc consult to assist with dosing of cyclophosphamide and will discuss if patient can get infusions outpatient.    HPI, Past Medical, Family, and Social History remains the same as documented in the initial encounter.     Review of Systems   Constitutional: Negative for chills and fever.   Eyes: Negative for photophobia and visual disturbance.   Respiratory: Negative for cough and shortness of breath.    Cardiovascular: Negative for chest pain and palpitations.   Musculoskeletal: Negative for arthralgias and myalgias.   Skin: Negative for rash and wound.   Neurological: Positive for dizziness. Negative for speech difficulty, weakness and numbness.   Hematological: Negative for adenopathy. Does not bruise/bleed easily.    Psychiatric/Behavioral: Positive for confusion and decreased concentration. Negative for agitation.     Scheduled Meds:   aspirin  81 mg Oral Daily    atenolol  50 mg Oral Daily    atorvastatin  40 mg Oral Daily    diltiaZEM  240 mg Oral Daily    heparin (porcine)  5,000 Units Subcutaneous Q8H    levetiracetam  500 mg Oral BID    omega-3 fatty acids-fish oil  1 capsule Oral BID    pantoprazole  40 mg Oral Daily    potassium bicarbonate  50 mEq Oral Daily    predniSONE  60 mg Oral Daily    sertraline  150 mg Oral Daily    sodium chloride 0.9%  3 mL Intravenous Q8H    valsartan  320 mg Oral Daily    vitamin D  5,000 Units Oral Daily     Continuous Infusions:   sodium chloride 0.9% 125 mL/hr at 09/18/17 1300    insulin (HUMAN R) infusion (adults) 2.5 Units/hr (09/18/17 0817)    sodium chloride 0.9%       PRN Meds:dextrose 50%, dextrose 50%, glucagon (human recombinant), glucose, glucose, insulin aspart, labetalol, ondansetron, ondansetron, sodium chloride 0.9%    Objective:     Vital Signs (Most Recent):  Temp: 98.4 °F (36.9 °C) (09/18/17 1221)  Pulse: 73 (09/18/17 1221)  Resp: 18 (09/18/17 1221)  BP: 113/69 (09/18/17 1221)  SpO2: 96 % (09/18/17 1221)  BP Location: Right arm    Vital Signs Range (Last 24H):  Temp:  [96.3 °F (35.7 °C)-98.4 °F (36.9 °C)]   Pulse:  [54-83]   Resp:  [18-19]   BP: (113-164)/(69-88)   SpO2:  [96 %-100 %]   BP Location: Right arm    Physical Exam  General:  Well-developed, obese, nad  HEENT:  NCAT, PERRL, EOMI with improved L LR movement on leftward gaze.    Neck:  Supple, no palpable lad, no apparent JVD  Resp:  Symmetric expansion, no increased wob  CVS:  Trace LE edema.  Peripheral pulses 2+ (radial, posterior tibial)  GI:  Abd obese, soft, non-distended, non-tender to palpation  Neurological Exam:   Mental Status:  AAOx3.  Speech, thought content appropriate.  Recent recall 0/3, remote recall intact per questions about work.  Cranial Nerves:  VFs full.  PERRL,  EOMI--improved L LR movement. Facial movement, sensation intact and symmetric.  Palate raise symmetric, tongue protrudes midline.  Trapezius/SCM 5/5.  Motor:  Normal bulk and tone.  Strength diffusely 5/5.  Sensory:  Intact to light touch and temperature at all extremities, no inattention.  Reflexes:  Biceps, brachioradialis, patellar 2+.  No ankle clonus.  Downgoing toe bilaterally.  Coordination:  FNF, ALONSO, HTS intact, symmetric.  Gait:  No imbalance, normal stride length, no shuffling, normal posture.  Mild sway on Romberg but maintains balance.    NIH Stroke Scale:  Interval: 7 days or at discharge (whichever comes first)  Level of Consciousness: 0 - alert  LOC Questions: 0 - answers both correctly  LOC Commands: 0 - performs both correctly  Best Gaze: 0 - normal  Visual: 0 - no visual loss  Facial Palsy: 0 - normal  Motor Left Arm: 0 - no drift  Motor Right Arm: 0 - no drift  Motor Left Le - no drift  Motor Right Le - no drift  Limb Ataxia: 0 - absent  Sensory: 0 - normal  Best Language: 0 - no aphasia  Dysarthria: 0 - normal articulation  Extinction and Inattention: 0 - no neglect  NIH Stroke Scale Total: 0      Laboratory:  CMP:     Recent Labs  Lab 17  0406   CALCIUM 8.0*   ALBUMIN 2.9*   PROT 5.8*      K 2.9*   CO2 25      BUN 13   CREATININE 0.7   ALKPHOS 69   ALT 17   AST 15   BILITOT 0.6     CBC:     Recent Labs  Lab 17  0414   WBC 11.02   RBC 4.09*   HGB 11.3*   HCT 32.7*      MCV 80*   MCH 27.6   MCHC 34.6     Lipid Panel:     Recent Labs  Lab 09/15/17  0819   CHOL 190   LDLCALC 69.2   HDL 51   TRIG 349*     Coagulation:     Recent Labs  Lab 09/15/17  0424   INR 1.0   APTT 22.2     Hgb A1C:     Recent Labs  Lab 09/15/17  0046   HGBA1C 10.8*     TSH:   No results for input(s): TSH in the last 168 hours.  Diagnostic Results:  17 MRI Brain w/ w/o contrast:  1. Findings compatible with a small acute lacunar infarct adjacent to the left frontal horn.  Nonspecific  enhancement just inferior to this region and extending into the left basal ganglia, as well as within the inferior aspect of the left cerebellum.  No evidence of a focal mass.  2.  Extensive increased signal intensity involving the periventricular white matter compatible with demyelinating disease versus vasculitis.  3.  Clinical correlation and followup recommended.  4.  This reportedly flattened in the EPIC and the corpus callosum medical record system.           Kae Armstrong MD  Comprehensive Stroke Center  Department of Vascular Neurology   [facility]

## 2017-09-18 NOTE — PLAN OF CARE
Problem: SLP Goal  Goal: SLP Goal  Speech Language Pathology Goals  Goals expected to be met by 9/22  1. Patient will tolerate regular consistency/thin liquids po diet with no overt s/s of aspiration.   2. Pt will complete categorization tasks with 80% acc provided min cues to improve cognitive-linguistic skill.   3.Pt will complete sentences with 80% acc provided min cues to improve expressive language.  4. Pt will solve mod level problems with 80% acc provided min cues to improve cognition.   5. Pt will complete high level word finding tasks with 80% acc provided min cues to improve expressive language.          Pt with continued progress towards goals.  Recommending nectar thick liquids.      Marva Madrid M.A. CCC-SLP  Speech Language Pathologist  [phone]  9/18/2017

## 2017-09-18 NOTE — ASSESSMENT & PLAN NOTE
Solumedrol 1000mg IV QD x 3 9/15/17 - 9/17/17, prednisone 60mg QD 9/18/17  Anticipate high in BG readings

## 2017-09-18 NOTE — ASSESSMENT & PLAN NOTE
Lab Results   Component Value Date    ALT 17 09/18/2017    AST 15 09/18/2017    ALKPHOS 69 09/18/2017    BILITOT 0.6 09/18/2017     Optimize DM control

## 2017-09-19 ENCOUNTER — TELEPHONE (OUTPATIENT)
Dept: ENDOCRINOLOGY | Facility: CLINIC | Age: 57
End: 2017-09-19

## 2017-09-19 ENCOUNTER — TELEPHONE (OUTPATIENT)
Dept: ENDOCRINOLOGY | Facility: HOSPITAL | Age: 57
End: 2017-09-19

## 2017-09-19 VITALS
HEART RATE: 66 BPM | WEIGHT: 212.88 LBS | BODY MASS INDEX: 30.48 KG/M2 | DIASTOLIC BLOOD PRESSURE: 65 MMHG | OXYGEN SATURATION: 91 % | TEMPERATURE: 99 F | HEIGHT: 70 IN | RESPIRATION RATE: 18 BRPM | SYSTOLIC BLOOD PRESSURE: 117 MMHG

## 2017-09-19 PROBLEM — R74.01 TRANSAMINITIS: Status: ACTIVE | Noted: 2017-09-19

## 2017-09-19 LAB
ALBUMIN SERPL BCP-MCNC: 2.7 G/DL
ALP SERPL-CCNC: 88 U/L
ALT SERPL W/O P-5'-P-CCNC: 119 U/L
ANION GAP SERPL CALC-SCNC: 9 MMOL/L
AST SERPL-CCNC: 130 U/L
BASOPHILS # BLD AUTO: 0 K/UL
BASOPHILS NFR BLD: 0 %
BILIRUB SERPL-MCNC: 0.6 MG/DL
BUN SERPL-MCNC: 21 MG/DL
CALCIUM SERPL-MCNC: 7.9 MG/DL
CHLORIDE SERPL-SCNC: 105 MMOL/L
CO2 SERPL-SCNC: 26 MMOL/L
CREAT SERPL-MCNC: 0.9 MG/DL
DIFFERENTIAL METHOD: ABNORMAL
EOSINOPHIL # BLD AUTO: 0 K/UL
EOSINOPHIL NFR BLD: 0 %
ERYTHROCYTE [DISTWIDTH] IN BLOOD BY AUTOMATED COUNT: 15.8 %
EST. GFR  (AFRICAN AMERICAN): >60 ML/MIN/1.73 M^2
EST. GFR  (NON AFRICAN AMERICAN): >60 ML/MIN/1.73 M^2
GLUCOSE SERPL-MCNC: 276 MG/DL
HBV CORE AB SERPL QL IA: NEGATIVE
HBV SURFACE AB SER-ACNC: NEGATIVE M[IU]/ML
HCT VFR BLD AUTO: 32.6 %
HGB BLD-MCNC: 11.5 G/DL
LYMPHOCYTES # BLD AUTO: 0.4 K/UL
LYMPHOCYTES NFR BLD: 6.9 %
MCH RBC QN AUTO: 28 PG
MCHC RBC AUTO-ENTMCNC: 35.3 G/DL
MCV RBC AUTO: 79 FL
MONOCYTES # BLD AUTO: 0.4 K/UL
MONOCYTES NFR BLD: 7.2 %
NEUTROPHILS # BLD AUTO: 4.6 K/UL
NEUTROPHILS NFR BLD: 85.3 %
PLATELET # BLD AUTO: 153 K/UL
PMV BLD AUTO: 9.4 FL
POCT GLUCOSE: 215 MG/DL (ref 70–110)
POCT GLUCOSE: 251 MG/DL (ref 70–110)
POCT GLUCOSE: 300 MG/DL (ref 70–110)
POTASSIUM SERPL-SCNC: 3.4 MMOL/L
PROT SERPL-MCNC: 5.5 G/DL
RBC # BLD AUTO: 4.11 M/UL
SODIUM SERPL-SCNC: 140 MMOL/L
WBC # BLD AUTO: 5.4 K/UL

## 2017-09-19 PROCEDURE — 97530 THERAPEUTIC ACTIVITIES: CPT

## 2017-09-19 PROCEDURE — 63600175 PHARM REV CODE 636 W HCPCS: Performed by: STUDENT IN AN ORGANIZED HEALTH CARE EDUCATION/TRAINING PROGRAM

## 2017-09-19 PROCEDURE — 25000003 PHARM REV CODE 250: Performed by: STUDENT IN AN ORGANIZED HEALTH CARE EDUCATION/TRAINING PROGRAM

## 2017-09-19 PROCEDURE — 25000003 PHARM REV CODE 250: Performed by: NURSE PRACTITIONER

## 2017-09-19 PROCEDURE — 99232 SBSQ HOSP IP/OBS MODERATE 35: CPT | Mod: ,,, | Performed by: NURSE PRACTITIONER

## 2017-09-19 PROCEDURE — 36415 COLL VENOUS BLD VENIPUNCTURE: CPT

## 2017-09-19 PROCEDURE — 63600175 PHARM REV CODE 636 W HCPCS: Performed by: PSYCHIATRY & NEUROLOGY

## 2017-09-19 PROCEDURE — 85025 COMPLETE CBC W/AUTO DIFF WBC: CPT

## 2017-09-19 PROCEDURE — 97116 GAIT TRAINING THERAPY: CPT

## 2017-09-19 PROCEDURE — 63600175 PHARM REV CODE 636 W HCPCS: Performed by: NURSE PRACTITIONER

## 2017-09-19 PROCEDURE — 90471 IMMUNIZATION ADMIN: CPT | Performed by: PSYCHIATRY & NEUROLOGY

## 2017-09-19 PROCEDURE — A4216 STERILE WATER/SALINE, 10 ML: HCPCS | Performed by: NURSE PRACTITIONER

## 2017-09-19 PROCEDURE — 90670 PCV13 VACCINE IM: CPT | Performed by: PSYCHIATRY & NEUROLOGY

## 2017-09-19 PROCEDURE — 99233 SBSQ HOSP IP/OBS HIGH 50: CPT | Mod: ,,, | Performed by: PSYCHIATRY & NEUROLOGY

## 2017-09-19 PROCEDURE — 80053 COMPREHEN METABOLIC PANEL: CPT

## 2017-09-19 RX ORDER — PREDNISONE 20 MG/1
TABLET ORAL
Qty: 90 TABLET | Refills: 2 | Status: SHIPPED | OUTPATIENT
Start: 2017-09-19 | End: 2017-11-10

## 2017-09-19 RX ORDER — INSULIN ASPART 100 [IU]/ML
0-5 INJECTION, SOLUTION INTRAVENOUS; SUBCUTANEOUS
Status: DISCONTINUED | OUTPATIENT
Start: 2017-09-19 | End: 2017-09-19 | Stop reason: HOSPADM

## 2017-09-19 RX ORDER — INSULIN ASPART 100 [IU]/ML
15 INJECTION, SOLUTION INTRAVENOUS; SUBCUTANEOUS
Qty: 1 BOX | Refills: 3 | Status: SHIPPED | OUTPATIENT
Start: 2017-09-19 | End: 2017-09-26 | Stop reason: SDUPTHER

## 2017-09-19 RX ORDER — PREDNISONE 10 MG/1
TABLET ORAL
Qty: 21 TABLET | Refills: 0 | Status: SHIPPED | OUTPATIENT
Start: 2017-09-19 | End: 2017-09-19 | Stop reason: HOSPADM

## 2017-09-19 RX ORDER — IBUPROFEN 200 MG
24 TABLET ORAL
Status: DISCONTINUED | OUTPATIENT
Start: 2017-09-19 | End: 2017-09-19 | Stop reason: HOSPADM

## 2017-09-19 RX ORDER — GLUCAGON 1 MG
1 KIT INJECTION
Status: DISCONTINUED | OUTPATIENT
Start: 2017-09-19 | End: 2017-09-19 | Stop reason: HOSPADM

## 2017-09-19 RX ORDER — INSULIN ASPART 100 [IU]/ML
13 INJECTION, SOLUTION INTRAVENOUS; SUBCUTANEOUS
Status: DISCONTINUED | OUTPATIENT
Start: 2017-09-19 | End: 2017-09-19

## 2017-09-19 RX ORDER — IBUPROFEN 200 MG
16 TABLET ORAL
Status: DISCONTINUED | OUTPATIENT
Start: 2017-09-19 | End: 2017-09-19 | Stop reason: HOSPADM

## 2017-09-19 RX ORDER — INSULIN ASPART 100 [IU]/ML
15 INJECTION, SOLUTION INTRAVENOUS; SUBCUTANEOUS
Status: DISCONTINUED | OUTPATIENT
Start: 2017-09-19 | End: 2017-09-19 | Stop reason: HOSPADM

## 2017-09-19 RX ORDER — PREDNISONE 20 MG/1
60 TABLET ORAL DAILY
Status: DISCONTINUED | OUTPATIENT
Start: 2017-09-19 | End: 2017-09-19 | Stop reason: HOSPADM

## 2017-09-19 RX ORDER — PREDNISONE 20 MG/1
TABLET ORAL
Qty: 42 TABLET | Refills: 0 | Status: SHIPPED | OUTPATIENT
Start: 2017-09-19 | End: 2017-09-19

## 2017-09-19 RX ADMIN — DILTIAZEM HYDROCHLORIDE 240 MG: 240 CAPSULE, EXTENDED RELEASE ORAL at 08:09

## 2017-09-19 RX ADMIN — VALSARTAN 320 MG: 160 TABLET, FILM COATED ORAL at 08:09

## 2017-09-19 RX ADMIN — PANTOPRAZOLE SODIUM 40 MG: 40 TABLET, DELAYED RELEASE ORAL at 08:09

## 2017-09-19 RX ADMIN — PNEUMOCOCCAL 13-VALENT CONJUGATE VACCINE 0.5 ML: 2.2; 2.2; 2.2; 2.2; 2.2; 4.4; 2.2; 2.2; 2.2; 2.2; 2.2; 2.2; 2.2 INJECTION, SUSPENSION INTRAMUSCULAR at 04:09

## 2017-09-19 RX ADMIN — HEPARIN SODIUM 5000 UNITS: 5000 INJECTION, SOLUTION INTRAVENOUS; SUBCUTANEOUS at 06:09

## 2017-09-19 RX ADMIN — HEPARIN SODIUM 5000 UNITS: 5000 INJECTION, SOLUTION INTRAVENOUS; SUBCUTANEOUS at 03:09

## 2017-09-19 RX ADMIN — ATORVASTATIN CALCIUM 40 MG: 20 TABLET, FILM COATED ORAL at 08:09

## 2017-09-19 RX ADMIN — VITAMIN D, TAB 1000IU (100/BT) 5000 UNITS: 25 TAB at 08:09

## 2017-09-19 RX ADMIN — INSULIN ASPART 13 UNITS: 100 INJECTION, SOLUTION INTRAVENOUS; SUBCUTANEOUS at 11:09

## 2017-09-19 RX ADMIN — ASPIRIN 81 MG: 81 TABLET, COATED ORAL at 08:09

## 2017-09-19 RX ADMIN — INSULIN ASPART 10 UNITS: 100 INJECTION, SOLUTION INTRAVENOUS; SUBCUTANEOUS at 07:09

## 2017-09-19 RX ADMIN — Medication 3 ML: at 03:09

## 2017-09-19 RX ADMIN — POTASSIUM BICARBONATE 50 MEQ: 25 TABLET, EFFERVESCENT ORAL at 08:09

## 2017-09-19 RX ADMIN — INSULIN ASPART 6 UNITS: 100 INJECTION, SOLUTION INTRAVENOUS; SUBCUTANEOUS at 03:09

## 2017-09-19 RX ADMIN — LEVETIRACETAM 500 MG: 500 TABLET ORAL at 08:09

## 2017-09-19 RX ADMIN — Medication 1 CAPSULE: at 08:09

## 2017-09-19 RX ADMIN — PREDNISONE 60 MG: 20 TABLET ORAL at 12:09

## 2017-09-19 RX ADMIN — SERTRALINE HYDROCHLORIDE 150 MG: 100 TABLET ORAL at 08:09

## 2017-09-19 RX ADMIN — ATENOLOL 50 MG: 50 TABLET ORAL at 08:09

## 2017-09-19 RX ADMIN — Medication 3 ML: at 06:09

## 2017-09-19 NOTE — ASSESSMENT & PLAN NOTE
-Stroke risk factor.  Recent increased BG 2/2 high dose steroid use.  -Endocrinology following and providing recommendations for BG management, appreciate recs   -Discharging on detemir 25 U bid, aspart 15 U tidwm   -[facility] aide to assist with insulin administration while patient's wife is still learning, orders placed   -Plan for Endocrine follow up with MORAIMA Steele

## 2017-09-19 NOTE — PROGRESS NOTES
Received call from Rheumatology regarding this patient.  Looked into previous immunizations, vaccine history and patient is appropriately vaccinated.  Had been checked before receiving cyclophosphamide last time.  Also started other medications per their recommendations--Bactrim DS M/W/F, vitamin D, calcium.  Will order DEXA scan follow up on discharge per their recommendations as well.    First dose cyclophosphamide initiated.  Will evaluate how patient tolerates this dose.  Discharge recommendations and plan for outpatient infusions to be completed 09/19/17 am barring any poor reaction to infusion.  Prednisone 60 mg po qd initiated, will taper per Dr. Love's recommendations.  Will recheck CBC in am.  Additional labwork ordered by Rheumatology to be completed prior to discharge, likely 09/19/17 am.    Kae Armstrong MD  PGY2, Neurology  Pager:  [phone]

## 2017-09-19 NOTE — ASSESSMENT & PLAN NOTE
-Cerebral angiogram 05/2017 c/w cerebral vasculitis vs multifocal atherosclerotic disease.  -Signs of demyelinating disease vs vasculitis on imaging. Pt sees Dr. Love, recently taken off chemo 2/2 neutropenia.     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  On prednisone 60 mg po qd with taper to 40 mg po qd per Dr. Olguin's last note.  -Seen by Rheum (Dr. Durant) 05/2017 with autoimmune workup negative at that time.  Will have patient follow up with Dr. Durant.  -IP consult to Rheumatology--recommended cyclophosphamide vs rituximab.  Discussed with Dr. Love, decided on cyclophosphamide 600 mg/m2--dose 09/18/17.  -Follow up to be established with Dr. Love/Dr. Olguin, Dr. Durant, Endocrine, and ID for recs for vaccination/antibiotic ppx in setting of immunosuppression.

## 2017-09-19 NOTE — SUBJECTIVE & OBJECTIVE
"Interval HPI:   No acute events overnight. BGs uncontrolled. No hypoglycemia. On a 1,800 aleksander diabetic diet. States he is eating 100% of his meals. Afebrile. No c/o nausea.     BP (!) 152/82 (BP Location: Right arm, Patient Position: Lying)   Pulse (!) 58   Temp 98 °F (36.7 °C) (Oral)   Resp 19   Ht 5' 10" (1.778 m)   Wt 96.6 kg (212 lb 13.7 oz)   SpO2 97%   BMI 30.54 kg/m²     Labs Reviewed and Include      Recent Labs  Lab 09/19/17  0426   *   CALCIUM 7.9*   ALBUMIN 2.7*   PROT 5.5*      K 3.4*   CO2 26      BUN 21*   CREATININE 0.9   ALKPHOS 88   *   *   BILITOT 0.6     Lab Results   Component Value Date    WBC 11.02 09/16/2017    HGB 11.3 (L) 09/16/2017    HCT 32.7 (L) 09/16/2017    MCV 80 (L) 09/16/2017     09/16/2017     No results for input(s): TSH, FREET4 in the last 168 hours.  Lab Results   Component Value Date    HGBA1C 10.8 (H) 09/15/2017       Nutritional status:   Body mass index is 30.54 kg/m².  Lab Results   Component Value Date    ALBUMIN 2.7 (L) 09/19/2017    ALBUMIN 2.9 (L) 09/18/2017    ALBUMIN 2.9 (L) 09/17/2017    ALBUMIN 2.9 (L) 09/17/2017     No results found for: PREALBUMIN    Estimated Creatinine Clearance: 103 mL/min (based on SCr of 0.9 mg/dL).    Accu-Checks  Recent Labs      09/17/17   1822  09/17/17   2106  09/17/17   2246  09/18/17   0038  09/18/17   0243  09/18/17   0437  09/18/17   0641  09/18/17   0809  09/18/17   1105  09/18/17   1704   POCTGLUCOSE  144*  139*  147*  141*  155*  167*  165*  168*  198*  257*       Current Medications and/or Treatments Impacting Glycemic Control  Immunotherapy:  Immunosuppressants     None        Steroids:   Hormones     None        Pressors:    Autonomic Drugs     None        Hyperglycemia/Diabetes Medications: Antihyperglycemics     Start     Stop Route Frequency Ordered    09/18/17 1700  insulin aspart pen 10 Units      -- SubQ 3 times daily with meals 09/18/17 1657    09/18/17 0854  insulin aspart " pen 0-10 Units      -- SubQ As needed (PRN) 09/18/17 0754    09/18/17 0753  insulin regular (Humulin R) 100 Units in sodium chloride 0.9% 100 mL infusion      -- IV Continuous 09/18/17 0754

## 2017-09-19 NOTE — PLAN OF CARE
Problem: Physical Therapy Goal  Goal: Physical Therapy Goal  Goals to be met by: 2017     Patient will increase functional independence with mobility by performin. Supine to sit with Modified [place]    MET 2017  2. Sit to supine with Modified [place]     MET 2017  3. Sit to stand transfer with Supervision     NOT MET  4. Bed to chair transfer with Supervision without AD   NOT MET  5. Gait  x 300 feet with Supervision using Single-point Cane .   NOT MET   6. Lower extremity exercise program x20 reps per handout, with independence    NOT MET        Goals remain appropriate.     Inés Alfonso, PTA.  2017

## 2017-09-19 NOTE — PROGRESS NOTES
[facility]ine loc dc'd. Telemetry monitor dc'd. Pt escort requested via wheelchair. No distress noted. Discharge instructions given to pt.

## 2017-09-19 NOTE — SUBJECTIVE & OBJECTIVE
Neurologic Chief Complaint: Acute encephalopathy, cognitive deficits    Subjective:     Interval History: Patient is seen for follow-up neurological assessment and treatment recommendations.  Patient did well with his cyclophosphamide infusion overnight.  No issues with medication and continues to do well today.  He is eager to go home.  Discussed plan on discharge with wife and patient for cyclophosphamide monthly with prednisone taper and close follow up of labs.  Patient will also have several appointments on discharge to follow up with Dr. Love/Dr. Olguin, follow up with Endocrine, follow up with Rheumatology, and an appointment with ID due to immunosuppression--recommendations for antibiotic ppx and vaccinations.  Patient and his wife understand this, requested some [facility] Aide assistance due to insulin recommendation.  Pt is already getting HHC through [facility]--updated [facility] orders.      HPI, Past Medical, Family, and Social History remains the same as documented in the initial encounter.     Review of Systems   Constitutional: Negative for chills and fever.   Eyes: Negative for photophobia and visual disturbance.   Respiratory: Negative for cough and shortness of breath.    Cardiovascular: Negative for chest pain and palpitations.   Gastrointestinal: Negative for abdominal pain, constipation, diarrhea, nausea and vomiting.   Musculoskeletal: Negative for arthralgias and myalgias.   Skin: Negative for rash and wound.   Neurological: Positive for dizziness. Negative for speech difficulty, weakness, numbness and headaches.   Hematological: Negative for adenopathy. Does not bruise/bleed easily.   Psychiatric/Behavioral: Positive for decreased concentration. Negative for agitation and behavioral problems.     Scheduled Meds:   aspirin  81 mg Oral Daily    atenolol  50 mg Oral Daily    atorvastatin  40 mg Oral Daily    calcium carbonate  1,000 mg Oral Once    diltiaZEM  240 mg Oral Daily    heparin (porcine)   5,000 Units Subcutaneous Q8H    insulin aspart  15 Units Subcutaneous TIDWM    insulin detemir  25 Units Subcutaneous BID    levetiracetam  500 mg Oral BID    omega-3 fatty acids-fish oil  1 capsule Oral BID    pantoprazole  40 mg Oral Daily    predniSONE  60 mg Oral Daily    sertraline  150 mg Oral Daily    sodium chloride 0.9%  3 mL Intravenous Q8H    [START ON 9/20/2017] sulfamethoxazole-trimethoprim 800-160mg  1 tablet Oral Every Mon, Wed, Fri    valsartan  320 mg Oral Daily    vitamin D  5,000 Units Oral Daily     Continuous Infusions:   sodium chloride 0.9% 125 mL/hr at 09/18/17 1700    sodium chloride 0.9%       PRN Meds:alteplase, dextrose 50%, dextrose 50%, glucagon (human recombinant), glucose, glucose, heparin, porcine (PF), insulin aspart, labetalol, ondansetron, ondansetron, sodium chloride 0.9%, sodium chloride 0.9%    Objective:     Vital Signs (Most Recent):  Temp: 96.8 °F (36 °C) (09/19/17 1231)  Pulse: (!) 59 (09/19/17 1231)  Resp: 16 (09/19/17 1231)  BP: 136/83 (09/19/17 1231)  SpO2: (!) 94 % (09/19/17 1231)  BP Location: Left arm    Vital Signs Range (Last 24H):  Temp:  [96.7 °F (35.9 °C)-98.7 °F (37.1 °C)]   Pulse:  [54-68]   Resp:  [16-19]   BP: (122-152)/(7-83)   SpO2:  [94 %-98 %]   BP Location: Left arm    Physical Exam  General:  Well-developed, obese, nad  HEENT:  NCAT, PERRL, EOMI with improved L LR movement on leftward gaze.    Neck:  Supple, no palpable lad, no apparent JVD  Resp:  Symmetric expansion, no increased wob  CVS:  Trace LE edema.  Peripheral pulses 2+ (radial, posterior tibial).  GI:  Abd obese, soft, non-distended, non-tender to palpation.  Neurological Exam:   Mental Status:  AAOx3.  Speech, thought content appropriate.  Recent recall 1/3, remote recall intact per questions about work.  Cranial Nerves:  VFs full.  PERRL, EOMI--improved L LR movement with no diplopia on leftward gaze. Facial movement, sensation intact and symmetric.  Palate raise symmetric,  tongue protrudes midline.  Trapezius/SCM 5/5.  Motor:  Normal bulk and tone.  Strength diffusely 5/5.  Sensory:  Intact to light touch and temperature at all extremities, no inattention.  Reflexes:  Biceps, brachioradialis, patellar 2+.  No ankle clonus.  Downgoing toe bilaterally.  Coordination:  FNF, ALONSO, HTS intact, symmetric.  No tremors, no myoclonus.  Gait:  No imbalance, normal stride length, no shuffling, normal posture.  Mild sway on Romberg but maintains balance.    NIH Stroke Scale:  Interval: 7 days or at discharge (whichever comes first)  Level of Consciousness: 0 - alert  LOC Questions: 0 - answers both correctly  LOC Commands: 0 - performs both correctly  Best Gaze: 0 - normal  Visual: 0 - no visual loss  Facial Palsy: 0 - normal  Motor Left Arm: 0 - no drift  Motor Right Arm: 0 - no drift  Motor Left Le - no drift  Motor Right Le - no drift  Limb Ataxia: 0 - absent  Sensory: 0 - normal  Best Language: 0 - no aphasia  Dysarthria: 0 - normal articulation  Extinction and Inattention: 0 - no neglect  NIH Stroke Scale Total: 0      Laboratory:  CMP:     Recent Labs  Lab 17  0426   CALCIUM 7.9*   ALBUMIN 2.7*   PROT 5.5*      K 3.4*   CO2 26      BUN 21*   CREATININE 0.9   ALKPHOS 88   *   *   BILITOT 0.6     CBC:     Recent Labs  Lab 17  0740   WBC 5.40   RBC 4.11*   HGB 11.5*   HCT 32.6*      MCV 79*   MCH 28.0   MCHC 35.3     Lipid Panel:     Recent Labs  Lab 09/15/17  0819   CHOL 190   LDLCALC 69.2   HDL 51   TRIG 349*     Coagulation:     Recent Labs  Lab 09/15/17  0424   INR 1.0   APTT 22.2     Hgb A1C:     Recent Labs  Lab 09/15/17  0046   HGBA1C 10.8*     TSH:   No results for input(s): TSH in the last 168 hours.  Diagnostic Results:  17 MRI Brain w/ w/o contrast:  1. Findings compatible with a small acute lacunar infarct adjacent to the left frontal horn.  Nonspecific enhancement just inferior to this region and extending into the left  basal ganglia, as well as within the inferior aspect of the left cerebellum.  No evidence of a focal mass.  2.  Extensive increased signal intensity involving the periventricular white matter compatible with demyelinating disease versus vasculitis.  3.  Clinical correlation and followup recommended.  4.  This reportedly flattened in the EPIC and the corpus callosum medical record system.

## 2017-09-19 NOTE — PLAN OF CARE
[facility]    HOME HEALTH ORDERS  FACE TO FACE ENCOUNTER    Patient Name: Bessy Nunez  YOB: 1960    PCP: Edgar Nova MD   PCP Address: [address] / [place] [address]  PCP Phone Number: [phone]  PCP Fax: [phone]    Encounter Date: 09/19/2017    Admit to Home Health    Diagnoses:  Active Hospital Problems    Diagnosis  POA    *Lacunar stroke of left subthalamic region [I63.50]  Yes     Priority: 2      9/14/2017 MRI brain: 1. Findings compatible with a small acute lacunar infarct adjacent to the left frontal horn.  Nonspecific enhancement just inferior to this region and extending into the left basal ganglia, as well as within the inferior aspect of the left cerebellum.  No evidence of a focal mass.  2.  Extensive increased signal intensity involving the periventricular white matter compatible with demyelinating disease versus vasculitis.  3.  Clinical correlation and followup recommended.  4.  This reportedly flattened in the EPIC and the corpus callosum medical record system.      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]  Yes     Priority: 1 - High     Follows with Dr. Love  By mri.  Several active lesions, many old.  5/13: MRA brain/neck, MRI brain w/wo contrast show no vascular occlusion, multiple foci of hyperintensity in deep cerebral periventricular white matter in pattern of demyelinating process.   5/17: MRI spine performed, no spinal cord lesions.  Hospitalization 6/2017. EEG was performed negative for seizures.  Repeat MRI showing new lesion, question whether this was demyelination versus CVA.  Cytoxan 6/3/17 & 8/14/17      Adverse effect of glucocorticoids and synthetic analogues, initial encounter [T38.0X5A]  Yes    Type 2 diabetes mellitus with diabetic polyneuropathy, with long-term current use of insulin [E11.42, Z79.4]  Not Applicable    Type 2 diabetes mellitus with hyperglycemia, with long-term current use of insulin [E11.65, Z79.4]  Not Applicable     JOHN (obstructive sleep apnea) [G47.33]  Yes    Obesity (BMI 30-39.9) [E66.9]  Yes    NAFLD (nonalcoholic fatty liver disease) [K76.0]  Yes     Chronic     6/12/2015 liver biopsy showing advanced stage fibrosis with bridging fibrosis and scattered nodules.      Mixed hyperlipidemia [E78.2]  Yes    Essential hypertension [I10]  Yes      Resolved Hospital Problems    Diagnosis Date Resolved POA    Hypokalemia [E87.6] 09/17/2017 Yes    Hypomagnesemia [E83.42] 09/17/2017 Yes       Future Appointments  Date Time Provider Department Center   9/29/2017 1:30 PM Jez Espinoza MD [facility] [place]   9/29/2017 2:30 PM Promise Steele NP [facility] ENDO [facility]   10/31/2017 10:20 AM Shana Love MD [facility] [place]     Follow-up Information     Schedule an appointment as soon as possible for a visit with Edgar Nova MD.    Specialty:  Internal Medicine  Why:  As needed for normal follow up  Contact information:  [address] [facility] [id number] [phone]             Shana Love MD. Schedule an appointment as soon as possible for a visit in 1 month.    Specialty:  Neurology  Why:  As needed for follow up of inpatient stay for CNS vasculitis, resuming cyclophosphamide therapy  Contact information:  [address]  [facility] [address] [phone]             Schedule an appointment as soon as possible for a visit with [facility] RHEUMATOLOGY.    Specialty:  Rheumatology  Why:  As needed for follow up of CNS vasculitis on cyclophosphamide, prednisone taper  Contact information:  [address]  [facility] [id number] [phone]           Saima Olguin II, MD.    Specialty:  Neurology  Why:  As needed--seen with Dr. Love in the past  Contact information:  [address]  [facility] [address] [phone]             [facility] - Infectious Diseases.    Specialty:  Infectious Diseases  Why:  As needed for ensuring appropriate ppx and vaccination in setting of possible chronic  immunosuppression on monthly cyclophosphamide and prednisone  Contact information:  [address]  [facility] [address] [phone]  Additional information:  1st Floor - [address]               I have seen and examined this patient face to face today. My clinical findings that support the need for the home health skilled services and home bound status are the following:  Weakness/numbness causing balance and gait disturbance due to Stroke and CNS Vasculitis making it taxing to leave home.  Patient with medication mismanagement issues requiring home bound status as evidenced by  Uncontrolled hyperglycemic/hypoglycemic events.    Allergies:Review of patient's allergies indicates:  No Known Allergies    Diet: diabetic diet: 1800 calorie    Activities: Up with assistance, remainder per home health PT/OT recs    Nursing:   SN to complete comprehensive assessment including routine vital signs. Instruct on disease process and s/s of complications to report to MD. Review/verify medication list sent home with the patient at time of discharge  and instruct patient/caregiver as needed. Frequency may be adjusted depending on start of care date.    Notify MD if SBP > 160 or < 90; DBP > 90 or < 50; HR > 120 or < 50; Temp > 101; Other:   Worsening/progressing neurologic deficits      CONSULTS:    Physical Therapy to evaluate and treat. Evaluate for home safety and equipment needs; Establish/upgrade home exercise program. Perform / instruct on therapeutic exercises, gait training, transfer training, and Range of Motion.  Occupational Therapy to evaluate and treat. Evaluate home environment for safety and equipment needs. Perform/Instruct on transfers, ADL training, ROM, and therapeutic exercises.  Speech Therapy  to evaluate and treat for  Language and Cognition.  Aide to provide assistance with personal care, ADLs, and vital signs.    MISCELLANEOUS CARE:  Diabetic Care:   SN to perform and educate Diabetic  "management with blood glucose monitoring:    WOUND CARE ORDERS  n/a    Medications: Review discharge medications with patient and family and provide education.      Current Discharge Medication List      START taking these medications    Details   calcium carbonate (OS-DONNA) 500 mg calcium (1,250 mg) tablet Take 2 tablets (1,000 mg total) by mouth once.  Refills: 0      insulin detemir (LEVEMIR FLEXTOUCH) 100 unit/mL (3 mL) SubQ InPn pen Inject 25 Units into the skin 2 (two) times daily.  Qty: 1 Box, Refills: 3      predniSONE (DELTASONE) 20 MG tablet Take 3 tablets daily (60 mg) for 2 wks. Then 2 tablets daily (40 mg). See Dr. Olguin, Dr. Love to adjust dose as needed long term.  Qty: 90 tablet, Refills: 2      sulfamethoxazole-trimethoprim 800-160mg (BACTRIM DS) 800-160 mg Tab Take 1 tablet by mouth every Mon, Wed, Fri.  Qty: 12 tablet, Refills: 3         CONTINUE these medications which have CHANGED    Details   insulin aspart (NOVOLOG) 100 unit/mL InPn pen Inject 15 Units into the skin 3 (three) times daily with meals.  Qty: 1 Box, Refills: 3         CONTINUE these medications which have NOT CHANGED    Details   aspirin (ECOTRIN) 81 MG EC tablet Take 81 mg by mouth once daily.      atenolol (TENORMIN) 50 MG tablet Take 1 tablet (50 mg total) by mouth once daily.  Qty: 90 tablet, Refills: 3    Associated Diagnoses: Essential hypertension      atorvastatin (LIPITOR) 40 MG tablet Take 1 tablet (40 mg total) by mouth once daily.  Qty: 90 tablet, Refills: 3    Associated Diagnoses: Other and unspecified hyperlipidemia      diltiaZEM (DILACOR XR) 240 MG CDCR Take 1 capsule (240 mg total) by mouth once daily.  Qty: 90 capsule, Refills: 3    Associated Diagnoses: Essential hypertension      insulin needles, disposable, (BD ULTRA-FINE ANA PEN NEEDLES) 32 x 5/32 " Ndle Inject twice daily  Qty: 100 each, Refills: 3      levetiracetam (KEPPRA) 500 MG Tab Take 1 tablet (500 mg total) by mouth 2 (two) times daily.  Qty: 60 " tablet, Refills: 2      liraglutide 0.6 mg/0.1 mL, 18 mg/3 mL, subq PNIJ (VICTOZA 3-TOMASZ) 0.6 mg/0.1 mL (18 mg/3 mL) PnIj Inject 1.8 mg into the skin once daily.  Qty: 9 mL, Refills: 11      metformin (GLUCOPHAGE-XR) 500 MG 24 hr tablet Take 2 tablets (1,000 mg total) by mouth 2 (two) times daily with meals.  Qty: 360 tablet, Refills: 0      omega-3 fatty acids-vitamin E (FISH OIL) 1,000 mg Cap Take 1 capsule by mouth 2 (two) times daily.  Refills: 0    Associated Diagnoses: Hypertriglyceridemia      ranitidine (ZANTAC) 150 MG tablet Take 1 tablet (150 mg total) by mouth 2 (two) times daily.  Qty: 60 tablet, Refills: 11      sertraline (ZOLOFT) 100 MG tablet 1 and half tablet daily  Qty: 135 tablet, Refills: 3    Associated Diagnoses: Depressive disorder, not elsewhere classified      valsartan (DIOVAN) 320 MG tablet Take 1 tablet (320 mg total) by mouth once daily.  Qty: 90 tablet, Refills: 3    Associated Diagnoses: Essential hypertension      vitamin D 1000 units Tab Take 5 tablets (5,000 Units total) by mouth once daily.           I certify that this patient is confined to his home and needs intermittent skilled nursing care, physical therapy, speech therapy and occupational therapy.

## 2017-09-19 NOTE — PROGRESS NOTES
[facility]  Vascular Neurology  Comprehensive Stroke Center  Progress Note    Assessment/Plan:     56 yo M with PMHx of HTN, HLD, DM II, CNS vasculitis--not currently on therapy, discharged in June 2017 for MS vs cardio-embolic vs watershade stroke, recently admitted and discharged for AMS presented to hospital as a transfer from [facility] for evaluation of acute stroke revealed on outpatient MRI imaging. Completed 3 d IV solumedrol, Rheumatology and Dr. Love recommend starting cyclophosphamide 600 mg/m2 with Hem/Onc consulted for assistance with infusion regimen and getting patient into outpatient infusion center for regular treatments.  Endocrine to provide recommendations for insulin on discharge.  Plan for monthly cyclophosphamide with Dr. Love and Dr. Olguin on board as well as assistance from Rheumatology and ID for vaccination and antibiotic ppx recommendations.  Follow up arranged with Dr. Olguin/Dr. Love in Neurology Clinic, PCP, Rheumatology, and Endocrine.  Patient's wife also requested C Aide to assist with insulin and BG checks for first couple weeks while they are getting used to his new medication regimen.    * Lacunar stroke of left subthalamic region    57 y.o. male with significant past medical history of HTN, HLD, DM II, CNS vasculitis (not currently on therapy), discharged in June 2017 for MS vs cardio-embolic vs watershade stroke. Cerebral angiogram in May 2017 consistent with cerebral vasculitis vs multifocal atherosclerotic disease. Recently admitted and discharged for AMS. Pt presented to hospital as a transfer from [facility] for evaluation of acute stroke revealed on outpatient MRI imaging.    - Antithrombotics for secondary stroke prevention: ASA 81 mg po qd  - Statins for secondary stroke prevention, HLD: Atorvastatin 40 mg po qd  - Aggressive risk factor modification: HTN, DM II, HLD, Obesity  - Rehab Efforts: PT/OT/SLP--HHC with PT/OT/SLP/HHC Aide  orders placed  - VTE Prophylaxis: Heparin 5000 U q8h      CNS vasculitis    -Cerebral angiogram 05/2017 c/w cerebral vasculitis vs multifocal atherosclerotic disease.  -Signs of demyelinating disease vs vasculitis on imaging. Pt sees Dr. Love, recently taken off chemo 2/2 neutropenia.     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  On prednisone 60 mg po qd with taper to 40 mg po qd per Dr. Olguin's last note.  -Seen by Rheum (Dr. Durant) 05/2017 with autoimmune workup negative at that time.  Will have patient follow up with Dr. Durant.  -IP consult to Rheumatology--recommended cyclophosphamide vs rituximab.  Discussed with Dr. Love, decided on cyclophosphamide 600 mg/m2--dose 09/18/17.  -Follow up to be established with Dr. Love/Dr. Olguin, Dr. Durant, Endocrine, and ID for recs for vaccination/antibiotic ppx in setting of immunosuppression.      Transaminitis    -Baseline AST and ALT in 10-20 range on current hospitalization and majority of lab data in this patient  -John to ,  09/18/17--only new medications cyclophosphamide, prednisone  -Patient has tolerated prednisone well in the past, less likely related to prednisone use but will follow CMP  -Discussed with Hem/Onc, note transaminitis can be seen with cyclophosphamide.  Will follow CMP.  -Patient asymptomatic at this time, bilirubin wnl.      NAFLD (nonalcoholic fatty liver disease)    -Baseline per patient and his wife--since 06/12/15 liver biopsy  -Transaminitis 09/19/17 with regular AST, ALT on current hospitalization otherwise  -Follow up CMP weekly, follow up with PCP for management       JOHN (obstructive sleep apnea)    -CPAP qHS, tolerating well currently  -Plan for CPAP qHS at home, will help with obesity as well      Obesity (BMI 30-39.9)    -Counseled on diet, exercise  -Also informed of steroid side effect of weight gain, will attempt to taper--long term plan per Dr. Olguin and Dr. Love      Type 2 diabetes  mellitus with hyperglycemia, with long-term current use of insulin    -Stroke risk factor.  Recent increased BG 2/2 high dose steroid use.  -Endocrinology following and providing recommendations for BG management, appreciate recs   -Discharging on detemir 25 U bid, aspart 15 U tidwm   -HHC aide to assist with insulin administration while patient's wife is still learning, orders placed   -Plan for Endocrine follow up with MORAIMA Steele      Essential hypertension    -Stroke risk factor.  Recommend SBP<180.  Continue home meds.  BP over past 24 h:  BP: Systolic 122 152   BP: Diastolic 7 83         Mixed hyperlipidemia    -Stroke risk factor.  LDL 69.2, continue home dose atorvastatin 40 mg po qd.     Neurologic Chief Complaint: Acute encephalopathy, cognitive deficits    Subjective:     Interval History: Patient is seen for follow-up neurological assessment and treatment recommendations.  Patient did well with his cyclophosphamide infusion overnight.  No issues with medication and continues to do well today.  He is eager to go home.  Discussed plan on discharge with wife and patient for cyclophosphamide monthly with prednisone taper and close follow up of labs.  Patient will also have several appointments on discharge to follow up with Dr. Love/Dr. Olguin, follow up with Endocrine, follow up with Rheumatology, and an appointment with ID due to immunosuppression--recommendations for antibiotic ppx and vaccinations.  Patient and his wife understand this, requested some C Aide assistance due to insulin recommendation.  Pt is already getting HHC through [facility] orders.      HPI, Past Medical, Family, and Social History remains the same as documented in the initial encounter.     Review of Systems   Constitutional: Negative for chills and fever.   Eyes: Negative for photophobia and visual disturbance.   Respiratory: Negative for cough and shortness of breath.    Cardiovascular: Negative for chest pain and  palpitations.   Gastrointestinal: Negative for abdominal pain, constipation, diarrhea, nausea and vomiting.   Musculoskeletal: Negative for arthralgias and myalgias.   Skin: Negative for rash and wound.   Neurological: Positive for dizziness. Negative for speech difficulty, weakness, numbness and headaches.   Hematological: Negative for adenopathy. Does not bruise/bleed easily.   Psychiatric/Behavioral: Positive for decreased concentration. Negative for agitation and behavioral problems.     Scheduled Meds:   aspirin  81 mg Oral Daily    atenolol  50 mg Oral Daily    atorvastatin  40 mg Oral Daily    calcium carbonate  1,000 mg Oral Once    diltiaZEM  240 mg Oral Daily    heparin (porcine)  5,000 Units Subcutaneous Q8H    insulin aspart  15 Units Subcutaneous TIDWM    insulin detemir  25 Units Subcutaneous BID    levetiracetam  500 mg Oral BID    omega-3 fatty acids-fish oil  1 capsule Oral BID    pantoprazole  40 mg Oral Daily    predniSONE  60 mg Oral Daily    sertraline  150 mg Oral Daily    sodium chloride 0.9%  3 mL Intravenous Q8H    [START ON 9/20/2017] sulfamethoxazole-trimethoprim 800-160mg  1 tablet Oral Every Mon, Wed, Fri    valsartan  320 mg Oral Daily    vitamin D  5,000 Units Oral Daily     Continuous Infusions:   sodium chloride 0.9% 125 mL/hr at 09/18/17 1700    sodium chloride 0.9%       PRN Meds:alteplase, dextrose 50%, dextrose 50%, glucagon (human recombinant), glucose, glucose, heparin, porcine (PF), insulin aspart, labetalol, ondansetron, ondansetron, sodium chloride 0.9%, sodium chloride 0.9%    Objective:     Vital Signs (Most Recent):  Temp: 96.8 °F (36 °C) (09/19/17 1231)  Pulse: (!) 59 (09/19/17 1231)  Resp: 16 (09/19/17 1231)  BP: 136/83 (09/19/17 1231)  SpO2: (!) 94 % (09/19/17 1231)  BP Location: Left arm    Vital Signs Range (Last 24H):  Temp:  [96.7 °F (35.9 °C)-98.7 °F (37.1 °C)]   Pulse:  [54-68]   Resp:  [16-19]   BP: (122-152)/(7-83)   SpO2:  [94 %-98 %]   BP  Location: Left arm    Physical Exam  General:  Well-developed, obese, nad  HEENT:  NCAT, PERRL, EOMI with improved L LR movement on leftward gaze.    Neck:  Supple, no palpable lad, no apparent JVD  Resp:  Symmetric expansion, no increased wob  CVS:  Trace LE edema.  Peripheral pulses 2+ (radial, posterior tibial).  GI:  Abd obese, soft, non-distended, non-tender to palpation.  Neurological Exam:   Mental Status:  AAOx3.  Speech, thought content appropriate.  Recent recall /3, remote recall intact per questions about work.  Cranial Nerves:  VFs full.  PERRL, EOMI--improved L LR movement with no diplopia on leftward gaze. Facial movement, sensation intact and symmetric.  Palate raise symmetric, tongue protrudes midline.  Trapezius/SCM 5/5.  Motor:  Normal bulk and tone.  Strength diffusely 5/5.  Sensory:  Intact to light touch and temperature at all extremities, no inattention.  Reflexes:  Biceps, brachioradialis, patellar 2+.  No ankle clonus.  Downgoing toe bilaterally.  Coordination:  FNF, ALONSO, HTS intact, symmetric.  No tremors, no myoclonus.  Gait:  No imbalance, normal stride length, no shuffling, normal posture.  Mild sway on Romberg but maintains balance.    NIH Stroke Scale:  Interval: 7 days or at discharge (whichever comes first)  Level of Consciousness: 0 - alert  LOC Questions: 0 - answers both correctly  LOC Commands: 0 - performs both correctly  Best Gaze: 0 - normal  Visual: 0 - no visual loss  Facial Palsy: 0 - normal  Motor Left Arm: 0 - no drift  Motor Right Arm: 0 - no drift  Motor Left Le - no drift  Motor Right Le - no drift  Limb Ataxia: 0 - absent  Sensory: 0 - normal  Best Language: 0 - no aphasia  Dysarthria: 0 - normal articulation  Extinction and Inattention: 0 - no neglect  NIH Stroke Scale Total: 0      Laboratory:  CMP:     Recent Labs  Lab 17  0426   CALCIUM 7.9*   ALBUMIN 2.7*   PROT 5.5*      K 3.4*   CO2 26      BUN 21*   CREATININE 0.9   ALKPHOS 88   ALT  119*   *   BILITOT 0.6     CBC:     Recent Labs  Lab 09/19/17  0740   WBC 5.40   RBC 4.11*   HGB 11.5*   HCT 32.6*      MCV 79*   MCH 28.0   MCHC 35.3     Lipid Panel:     Recent Labs  Lab 09/15/17  0819   CHOL 190   LDLCALC 69.2   HDL 51   TRIG 349*     Coagulation:     Recent Labs  Lab 09/15/17  0424   INR 1.0   APTT 22.2     Hgb A1C:     Recent Labs  Lab 09/15/17  0046   HGBA1C 10.8*     TSH:   No results for input(s): TSH in the last 168 hours.  Diagnostic Results:  09/14/17 MRI Brain w/ w/o contrast:  1. Findings compatible with a small acute lacunar infarct adjacent to the left frontal horn.  Nonspecific enhancement just inferior to this region and extending into the left basal ganglia, as well as within the inferior aspect of the left cerebellum.  No evidence of a focal mass.  2.  Extensive increased signal intensity involving the periventricular white matter compatible with demyelinating disease versus vasculitis.  3.  Clinical correlation and followup recommended.  4.  This reportedly flattened in the EPIC and the corpus callosum medical record system.           Kae Armstrong MD  Comprehensive Stroke Center  Department of Vascular Neurology   [facility]

## 2017-09-19 NOTE — ASSESSMENT & PLAN NOTE
-Stroke risk factor.  Recent increased BG 2/2 high dose steroid use.  -Endocrinology following and providing recommendations for BG management, appreciate recs   -Discharging on detemir 25 U bid, aspart 15 U tidwm   -[facility] aide to assist with insulin administration while patient's wife is still learning, orders placed   -Plan for Endocrine follow up with MORAIMA Steele

## 2017-09-19 NOTE — PLAN OF CARE
Problem: Patient Care Overview  Goal: Plan of Care Review  Outcome: Ongoing (interventions implemented as appropriate)  Plan of care reviewed with pt at bedside. Safety precautions initiated. Bed locked in lowest position, call bell within reach, Bed alarm on. AAOX4.VSS.Pt remains on insulin drip at 2.5 ml/hr. Will continue to monitor.

## 2017-09-19 NOTE — ASSESSMENT & PLAN NOTE
57 y.o. male with significant past medical history of HTN, HLD, DM II, CNS vasculitis (not currently on therapy), discharged in June 2017 for MS vs cardio-embolic vs watershade stroke. Cerebral angiogram in May 2017 consistent with cerebral vasculitis vs multifocal atherosclerotic disease. Recently admitted and discharged for AMS. Pt presented to hospital as a transfer from [facility] for evaluation of acute stroke revealed on outpatient MRI imaging.    - Antithrombotics for secondary stroke prevention: ASA 81 mg po qd  - Statins for secondary stroke prevention, HLD: Atorvastatin 40 mg po qd  - Aggressive risk factor modification: HTN, DM II, HLD, Obesity  - Rehab Efforts: PT/OT/SLP--HHC with PT/OT/SLP/HHC Aide orders placed  - VTE Prophylaxis: Heparin 5000 U q8h

## 2017-09-19 NOTE — PROGRESS NOTES
"[facility]  Endocrinology  Progress Note    Admit Date: 9/14/2017     Reason for Consult: Management of T2DM, Hyperglycemia     Diabetes diagnosis year: 9915-6711    Home Diabetes Medications:  Victoza 1.8mg QD, metformin BID, Levemir flextouch 20 units PM, Novolog flextouch 20 units AC  Lab Results   Component Value Date    HGBA1C 10.8 (H) 09/15/2017     How often checking glucose at home? 1-3 x day   BG readings on regimen: 325-350s  Hypoglycemia on the regimen?  No  Missed doses on regimen?  None recently as wife has been assisting with BG monitoring and insulin administration since 5/2017.     Diabetes Complications include:   Hyperglycemia    Complicating diabetes co morbidities: Glucocorticoid use  and Dementia    HPI: Patient is a 57 y.o. male with a diagnosis of T2DM based on bloodwork. He is managed by CHANCE Steele NP on [facility]. Last seen 3/2017 when converted from Bydureon to Victoza and Levemir to Tresiba. Tresiba is NOT covered by insurance. He is now receiving Medicaid.  Never hospitalized r/t DM.     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 He underwent chemo with steroid therapy since May 2017 which contributed to extreme BG elevations in 300 range. He has been confused, unsteady, frequent falls at home, fatigue "sleeps up to 20 hours a day".  He was recently admitted with AMS. MRI revealed CVA. He was admitted to [facility],  on admit. Started on low dose MDI. Plans for Solumedrol 1000mg IV x 3 days (9/15 - 9/18).  Endocrine consulted for BG mgmt.     Interval HPI:   No acute events overnight. BGs uncontrolled. No hypoglycemia. On a 1,800 aleksander diabetic diet. States he is eating 100% of his meals. Afebrile. No c/o nausea.     BP (!) 152/82 (BP Location: Right arm, Patient Position: Lying)   Pulse (!) 58   Temp 98 °F (36.7 °C) (Oral)   Resp 19   Ht 5' 10" (1.778 m)   Wt 96.6 kg (212 lb 13.7 oz)   SpO2 97%   BMI 30.54 kg/m²      Labs Reviewed and Include      Recent " Labs  Lab 09/19/17  0426   *   CALCIUM 7.9*   ALBUMIN 2.7*   PROT 5.5*      K 3.4*   CO2 26      BUN 21*   CREATININE 0.9   ALKPHOS 88   *   *   BILITOT 0.6     Lab Results   Component Value Date    WBC 11.02 09/16/2017    HGB 11.3 (L) 09/16/2017    HCT 32.7 (L) 09/16/2017    MCV 80 (L) 09/16/2017     09/16/2017     No results for input(s): TSH, FREET4 in the last 168 hours.  Lab Results   Component Value Date    HGBA1C 10.8 (H) 09/15/2017       Nutritional status:   Body mass index is 30.54 kg/m².  Lab Results   Component Value Date    ALBUMIN 2.7 (L) 09/19/2017    ALBUMIN 2.9 (L) 09/18/2017    ALBUMIN 2.9 (L) 09/17/2017    ALBUMIN 2.9 (L) 09/17/2017     No results found for: PREALBUMIN    Estimated Creatinine Clearance: 103 mL/min (based on SCr of 0.9 mg/dL).    Accu-Checks  Recent Labs      09/17/17   1822  09/17/17   2106  09/17/17   2246  09/18/17   0038  09/18/17   0243  09/18/17   0437  09/18/17   0641  09/18/17   0809  09/18/17   1105  09/18/17   1704   POCTGLUCOSE  144*  139*  147*  141*  155*  167*  165*  168*  198*  257*       Current Medications and/or Treatments Impacting Glycemic Control  Immunotherapy:  Immunosuppressants     None        Steroids:   Hormones     None        Pressors:    Autonomic Drugs     None        Hyperglycemia/Diabetes Medications: Antihyperglycemics     Start     Stop Route Frequency Ordered    09/18/17 1700  insulin aspart pen 10 Units      -- SubQ 3 times daily with meals 09/18/17 1657    09/18/17 0854  insulin aspart pen 0-10 Units      -- SubQ As needed (PRN) 09/18/17 0754    09/18/17 0753  insulin regular (Humulin R) 100 Units in sodium chloride 0.9% 100 mL infusion      -- IV Continuous 09/18/17 0754          ASSESSMENT and PLAN    * Lacunar stroke of left subthalamic region    Seen on MRI  Avoid hypoglycemia  Managed by Vascular Neurology        Type 2 diabetes mellitus with hyperglycemia, with long-term current use of insulin    BG  goal 140-180. Increase transition to 3 units/hr, Novolog 13 units with meals. Continue moderate dose correction scale secondary to high insulin needs. Monitor AC/HS/2A. Anticipate switching to subQ soon.     9/15/17 ordered bedside RN to perform insulin pen training for wife as she has never had DM edu/insulin instruction.     9/18/17 Insulin instruction reordered    Discharge planning:   No d/c plans currently.    Given Medicaid limited options - continue Victoza, metformin.   Anticipate convert Tresiba to Levemir for insurance coverage.   Anticipate adjust Novolog AC dose based on steroid dose.   F/u with CHANCE Steele NP - provider reports she has a few Medicaid slots and will be able to accommodate patient     Discharge on Levemir 25 units bid, Novolog 15 units with meals. Continue Victoza and Metformin. Rx for Levemir sent to pharmacy on file. F/U with GABRIEL Kendrick. Message sent for f/u          CNS vasculitis    Sees Dr. Love outpatient and was taken off of chemotherapy due to decreased WBC count.    Avoid hypoglycemia          Adverse effect of glucocorticoids and synthetic analogues, initial encounter    Solumedrol 1000mg IV QD x 3 9/15/17 - 9/17/17  Received Prednisone 60mg QD (last dose 9/18/17)  No steroids ordered yet as of 9/19/17 at 1055  Anticipate high in BG readings          NAFLD (nonalcoholic fatty liver disease)    Lab Results   Component Value Date     (H) 09/19/2017     (H) 09/19/2017    ALKPHOS 88 09/19/2017    BILITOT 0.6 09/19/2017     Optimize DM control          JOHN (obstructive sleep apnea)    If uncontrolled, may contribute to insulin resistance  Wearing CPAP inpt              Adrian Valdovinos, JOE, NP  Endocrinology  [facility]

## 2017-09-19 NOTE — ASSESSMENT & PLAN NOTE
BG goal 140-180. Increase transition to 3 units/hr, Novolog 13 units with meals. Continue moderate dose correction scale secondary to high insulin needs. Monitor AC/HS/2A. Anticipate switching to subQ soon.     9/15/17 ordered bedside RN to perform insulin pen training for wife as she has never had DM edu/insulin instruction.     9/18/17 Insulin instruction reordered    Discharge planning:   No d/c plans currently.    Given Medicaid limited options - continue Victoza, metformin.   Anticipate convert Tresiba to Levemir for insurance coverage.   Anticipate adjust Novolog AC dose based on steroid dose.   F/u with CHANCE tSeele NP - provider reports she has a few Medicaid slots and will be able to accommodate patient

## 2017-09-19 NOTE — ASSESSMENT & PLAN NOTE
Solumedrol 1000mg IV QD x 3 9/15/17 - 9/17/17  Received Prednisone 60mg QD (last dose 9/18/17)  No steroids ordered yet as of 9/19/17 at 1055  Anticipate high in BG readings

## 2017-09-19 NOTE — PT/OT/SLP DISCHARGE
Occupational Therapy Discharge Summary    Bessy Nunez  MRN: [id number]   Lacunar stroke of left subthalamic region   Patient Discharged from acute Occupational Therapy on 9/19.  Please refer to prior OT note dated on 9/18 for functional status.     Assessment:   Patient appropriate for care in another setting.  GOALS:    Occupational Therapy Goals     Not on file          Multidisciplinary Problems (Resolved)        Problem: Occupational Therapy Goal    Goal Priority Disciplines Outcome Interventions   Occupational Therapy Goal   (Resolved)     OT, PT/OT Outcome(s) achieved    Description:  Goals set 9/15 to be addressed for 7 days with expiration date, 9/22:  Patient will increase functional independence with ADLs by performing:    Patient will demonstrate rolling to the right with modified independence.  Not met   Patient will demonstrate rolling to the left with modified independence.   Not met  Patient will demonstrate supine -sit with modified independence.   Not met  Patient will demonstrate stand pivot transfers with modified independence.   Not met  Patient will demonstrate grooming while standing with modified independence.   Not met  Patient will demonstrate upper body dressing with modified independence.   Not met  Patient will demonstrate lower body dressing with modified independence.   Not met  Patient will demonstrate toileting with modified independence.   Not met  Patient's family / caregiver will demonstrate independence and safety with assisting patient with self-care skills and functional mobility.     Not met  Patient and/or patient's family will verbalize understanding of stroke prevention guidelines, personal risk factors and stroke warning signs via teachback method.  Not met                         Reasons for Discontinuation of Therapy Services  Transfer to alternate level of care.      Plan:  Patient Discharged to: Home with Home Health Service.  COLTON Cano  9/19/2017

## 2017-09-19 NOTE — ASSESSMENT & PLAN NOTE
Sees Dr. Love outpatient and was taken off of chemotherapy due to decreased WBC count.    Avoid hypoglycemia

## 2017-09-19 NOTE — ASSESSMENT & PLAN NOTE
BG goal 140-180. Increase transition to 3 units/hr, Novolog 13 units with meals. Continue moderate dose correction scale secondary to high insulin needs. Monitor AC/HS/2A. Anticipate switching to subQ soon.     9/15/17 ordered bedside RN to perform insulin pen training for wife as she has never had DM edu/insulin instruction.     9/18/17 Insulin instruction reordered    Discharge planning:   No d/c plans currently.    Given Medicaid limited options - continue Victoza, metformin.   Anticipate convert Tresiba to Levemir for insurance coverage.   Anticipate adjust Novolog AC dose based on steroid dose.   F/u with CHANCE Steele NP - provider reports she has a few Medicaid slots and will be able to accommodate patient     Addendum: discharge to home today on Levemir 25 units twice daily, Novolog 15 units with meals. Continue Victoza and Metformin. Rx for Levemir sent to pharmacy on file.

## 2017-09-19 NOTE — ASSESSMENT & PLAN NOTE
-Stroke risk factor.  Recommend SBP<180.  Continue home meds.  BP over past 24 h:  BP: Systolic 122 152   BP: Diastolic 7 83

## 2017-09-19 NOTE — SUBJECTIVE & OBJECTIVE
Interval History: Patient seen and examined at bedside. Received infusion of cyclophosphamide yesterday and tolerated treatment well. Denies changes in vision, arthralgias, headaches, SOB, chest pain.     Current Facility-Administered Medications   Medication Frequency    0.9%  NaCl infusion Continuous    alteplase injection 2 mg PRN    aspirin EC tablet 81 mg Daily    atenolol tablet 50 mg Daily    atorvastatin tablet 40 mg Daily    calcium carbonate tablet 1,000 mg Once    dextrose 50% injection 12.5 g PRN    dextrose 50% injection 25 g PRN    diltiaZEM 24 hr capsule 240 mg Daily    glucagon (human recombinant) injection 1 mg PRN    glucose chewable tablet 16 g PRN    glucose chewable tablet 24 g PRN    heparin (porcine) injection 5,000 Units Q8H    heparin, porcine (PF) 100 unit/mL injection flush 500 Units PRN    insulin aspart pen 0-5 Units QID (AC + HS) PRN    insulin aspart pen 15 Units TIDWM    insulin detemir pen 25 Units BID    labetalol injection 10 mg Q6H PRN    levetiracetam tablet 500 mg BID    omega-3 fatty acids-fish oil 340-1,000 mg capsule 1 capsule BID    ondansetron disintegrating tablet 8 mg Q8H PRN    ondansetron injection 4 mg Q12H PRN    pantoprazole EC tablet 40 mg Daily    predniSONE tablet 60 mg Daily    sertraline tablet 150 mg Daily    sodium chloride 0.9% bolus 500 mL Continuous PRN    sodium chloride 0.9% flush 10 mL PRN    sodium chloride 0.9% flush 3 mL Q8H    [START ON 9/20/2017] sulfamethoxazole-trimethoprim 800-160mg per tablet 1 tablet Every Mon, Wed, Fri    valsartan tablet 320 mg Daily    vitamin D 1000 units tablet 5,000 Units Daily     Objective:     Vital Signs (Most Recent):  Temp: 96.8 °F (36 °C) (09/19/17 1231)  Pulse: (!) 59 (09/19/17 1231)  Resp: 16 (09/19/17 1231)  BP: 136/83 (09/19/17 1231)  SpO2: (!) 94 % (09/19/17 1231)  O2 Device (Oxygen Therapy): room air (09/19/17 1231) Vital Signs (24h Range):  Temp:  [96.7 °F (35.9 °C)-98.7 °F  (37.1 °C)] 96.8 °F (36 °C)  Pulse:  [54-68] 59  Resp:  [16-19] 16  SpO2:  [94 %-98 %] 94 %  BP: (118-152)/(7-83) 136/83     Weight: 96.6 kg (212 lb 13.7 oz) (09/18/17 1449)  Body mass index is 30.54 kg/m².  Body surface area is 2.18 meters squared.      Intake/Output Summary (Last 24 hours) at 09/19/17 1311  Last data filed at 09/18/17 2035   Gross per 24 hour   Intake              510 ml   Output              750 ml   Net             -240 ml       Physical Exam   Constitutional: He is oriented to person, place, and time and well-developed, well-nourished, and in no distress.   HENT:   Head: Normocephalic.   Mouth/Throat: No oropharyngeal exudate.   Decrease in pupillary constriction left eye  EOMI   Eyes: EOM are normal. Pupils are equal, round, and reactive to light.   Neck: Normal range of motion. Neck supple.   Cardiovascular: Normal rate, regular rhythm and normal heart sounds.    Pulmonary/Chest: Effort normal and breath sounds normal.   Abdominal: Soft. Bowel sounds are normal. He exhibits no distension.       Right Side Rheumatological Exam     Examination finds the shoulder, elbow, wrist, knee, 1st PIP, 1st MCP, 2nd PIP, 2nd MCP, 3rd PIP, 3rd MCP, 4th PIP, 4th MCP, 5th PIP and 5th MCP normal.    Shoulder Exam   Sensation: normal    Knee Exam   Sensation: normal    Hip Exam   Sensation: normal    Elbow/Wrist Exam   Sensation: normal    Muscle Strength (0-5 scale):  Neck Flexion:  5  Deltoid:  5  Biceps: 5/5   Triceps:  5  : 5/5   Iliopsoas: 5  Quadriceps:  5   Distal Lower Extremity: 5    Left Side Rheumatological Exam     Examination finds the shoulder, elbow, wrist, knee, 1st PIP, 1st MCP, 2nd PIP, 2nd MCP, 3rd PIP, 3rd MCP, 4th PIP, 4th MCP, 5th PIP and 5th MCP normal.    Shoulder Exam   Sensation: normal    Knee Exam   Sensation: normal    Hip Exam   Sensation: normal    Elbow/Wrist Exam   Sensation: normal    Muscle Strength (0-5 scale):  Neck Flexion:  5  Deltoid:  5  Biceps: 5/5   Triceps:   5  :  5/5   Iliopsoas: 5  Quadriceps:  5   Distal Lower Extremity: 5      Lymphadenopathy:     He has no cervical adenopathy.   Neurological: He is alert and oriented to person, place, and time.   Reflex Scores:       Tricep reflexes are 2+ on the right side and 2+ on the left side.       Bicep reflexes are 2+ on the right side and 2+ on the left side.       Brachioradialis reflexes are 2+ on the right side and 2+ on the left side.       Patellar reflexes are 2+ on the right side and 2+ on the left side.       Achilles reflexes are 2+ on the right side and 2+ on the left side.  Skin: Skin is warm and dry.     Musculoskeletal: Normal range of motion. He exhibits no edema, tenderness or deformity.         Significant Labs:  Results for JEREMIAS LUCAS (MRN [id number]) as of 9/19/2017 13:13   Ref. Range 9/16/2017 04:14 9/19/2017 07:40   WBC Latest Ref Range: 3.90 - 12.70 K/uL 11.02 5.40   RBC Latest Ref Range: 4.60 - 6.20 M/uL 4.09 (L) 4.11 (L)   Hemoglobin Latest Ref Range: 14.0 - 18.0 g/dL 11.3 (L) 11.5 (L)   Hematocrit Latest Ref Range: 40.0 - 54.0 % 32.7 (L) 32.6 (L)   MCV Latest Ref Range: 82 - 98 fL 80 (L) 79 (L)   MCH Latest Ref Range: 27.0 - 31.0 pg 27.6 28.0   MCHC Latest Ref Range: 32.0 - 36.0 g/dL 34.6 35.3   RDW Latest Ref Range: 11.5 - 14.5 % 15.3 (H) 15.8 (H)   Platelets Latest Ref Range: 150 - 350 K/uL 196 153   MPV Latest Ref Range: 9.2 - 12.9 fL 9.6 9.4   Gran% Latest Ref Range: 38.0 - 73.0 % 95.7 (H) 85.3 (H)   Gran # Latest Ref Range: 1.8 - 7.7 K/uL 10.6 (H) 4.6   Lymph% Latest Ref Range: 18.0 - 48.0 % 3.9 (L) 6.9 (L)   Lymph # Latest Ref Range: 1.0 - 4.8 K/uL 0.4 (L) 0.4 (L)   Mono% Latest Ref Range: 4.0 - 15.0 % 0.1 (L) 7.2   Mono # Latest Ref Range: 0.3 - 1.0 K/uL 0.0 (L) 0.4   Eosinophil% Latest Ref Range: 0.0 - 8.0 % 0.0 0.0   Eos # Latest Ref Range: 0.0 - 0.5 K/uL 0.0 0.0   Basophil% Latest Ref Range: 0.0 - 1.9 % 0.1 0.0   Baso # Latest Ref Range: 0.00 - 0.20 K/uL 0.01 0.00   Results for  JEREMIAS LUCAS (MRN [id number]) as of 9/19/2017 13:13   Ref. Range 9/18/2017 04:06 9/19/2017 04:26   Sodium Latest Ref Range: 136 - 145 mmol/L 140 140   Potassium Latest Ref Range: 3.5 - 5.1 mmol/L 2.9 (L) 3.4 (L)   Chloride Latest Ref Range: 95 - 110 mmol/L 105 105   CO2 Latest Ref Range: 23 - 29 mmol/L 25 26   Anion Gap Latest Ref Range: 8 - 16 mmol/L 10 9   BUN, Bld Latest Ref Range: 6 - 20 mg/dL 13 21 (H)   Creatinine Latest Ref Range: 0.5 - 1.4 mg/dL 0.7 0.9   eGFR if non African American Latest Ref Range: >60 mL/min/1.73 m^2 >60.0 >60.0   eGFR if African American Latest Ref Range: >60 mL/min/1.73 m^2 >60.0 >60.0   Glucose Latest Ref Range: 70 - 110 mg/dL 162 (H) 276 (H)   Calcium Latest Ref Range: 8.7 - 10.5 mg/dL 8.0 (L) 7.9 (L)   Alkaline Phosphatase Latest Ref Range: 55 - 135 U/L 69 88   Total Protein Latest Ref Range: 6.0 - 8.4 g/dL 5.8 (L) 5.5 (L)   Albumin Latest Ref Range: 3.5 - 5.2 g/dL 2.9 (L) 2.7 (L)   Total Bilirubin Latest Ref Range: 0.1 - 1.0 mg/dL 0.6 0.6   AST Latest Ref Range: 10 - 40 U/L 15 130 (H)   ALT Latest Ref Range: 10 - 44 U/L 17 119 (H)

## 2017-09-19 NOTE — ASSESSMENT & PLAN NOTE
-Baseline per patient and his wife--since 06/12/15 liver biopsy  -Transaminitis 09/19/17 with regular AST, ALT on current hospitalization otherwise  -Follow up CMP weekly, follow up with PCP for management

## 2017-09-19 NOTE — PROGRESS NOTES
[facility]  Rheumatology  Progress Note    Patient Name: Bessy Nunez  MRN: [id number]  Admission Date: 9/14/2017  Hospital Length of Stay: 5 days  Code Status: Full Code   Attending Provider: Edgardo Forrest DO  Primary Care Physician: Edgar Nova MD  Principal Problem: Lacunar stroke of left subthalamic region    Subjective:     HPI: This is a 56yo M with PMH HTN, HLD, poorly controlled DMII, JAYDEN, who presented to ED after being notified about MRI results from 9/14/17 which revealed small L subcortical frontal infarct. Patient with no weakness, dizziness, headaches, sensation loss, changes in vision.   Patient with recent history of confusion, falls, and fatigue which started in May 2017. He was initially admitted from 5/13-5/18 with JAYDEN and diagnosed with MS. MRA brain/neck, MRI brain w/wo contrast showed no vascular occlusion, multiple foci of hyperintensity in deep cerebral periventricular white matter in pattern of demyelinating process. He was given 5 days of IV solumedrol which led to improvement in his symptoms.     On 5/24 he was admitted again with worsening double vision, dizziness, syncope and confusion. EEG 5/25 (2 hr) showed moderate disorganization and slowing c/w nonspecific encephalopathy.  On 5/28 he had waxing and waning mental status, MRI suggestive of active demyelination vs ischemia if multiple areas. Given Cytoxan 6/3 for CNS vasculitis with plan to repeat cytoxan once monthly (for total of 3) and discharged home with home health on prednisone 60 mg daily.   Per wife, patient has been sleeping most days and he has not been able to return to work since May. He has gets confused during normal conversation. He has difficulty completing complex tasks. Responded well to first dose of cyclophosphamide. He did well for about 1 week afterwards. Received 2nd treatment of cyclophosphamide in 8/2017. He was scheduled to receive another treatment but did not due to a low white  count which was seen on blood work last week. Currently denies any skin ulcers, headaches, abdominal pain, fevers, numbness, arthralgias, changes in vision, blood in urine/stool. Has had bladder/bowel incontinence since May.   Rheumatology consulted regarding management options for possible CNS vasculitis. He was last seen by Rheumatology in May 2017. Thorough autoimmune workup this far has been negative including REYMUNDO x 2, ANCA x 2, ACE, acl ab, C3, C4, SPEP, NMO also no clinical sign of systemic vasculitis.    Interval History: Patient seen and examined at bedside. Received infusion of cyclophosphamide yesterday and tolerated treatment well. Denies changes in vision, arthralgias, headaches, SOB, chest pain.     Current Facility-Administered Medications   Medication Frequency    0.9%  NaCl infusion Continuous    alteplase injection 2 mg PRN    aspirin EC tablet 81 mg Daily    atenolol tablet 50 mg Daily    atorvastatin tablet 40 mg Daily    calcium carbonate tablet 1,000 mg Once    dextrose 50% injection 12.5 g PRN    dextrose 50% injection 25 g PRN    diltiaZEM 24 hr capsule 240 mg Daily    glucagon (human recombinant) injection 1 mg PRN    glucose chewable tablet 16 g PRN    glucose chewable tablet 24 g PRN    heparin (porcine) injection 5,000 Units Q8H    heparin, porcine (PF) 100 unit/mL injection flush 500 Units PRN    insulin aspart pen 0-5 Units QID (AC + HS) PRN    insulin aspart pen 15 Units TIDWM    insulin detemir pen 25 Units BID    labetalol injection 10 mg Q6H PRN    levetiracetam tablet 500 mg BID    omega-3 fatty acids-fish oil 340-1,000 mg capsule 1 capsule BID    ondansetron disintegrating tablet 8 mg Q8H PRN    ondansetron injection 4 mg Q12H PRN    pantoprazole EC tablet 40 mg Daily    predniSONE tablet 60 mg Daily    sertraline tablet 150 mg Daily    sodium chloride 0.9% bolus 500 mL Continuous PRN    sodium chloride 0.9% flush 10 mL PRN    sodium chloride 0.9% flush  3 mL Q8H    [START ON 9/20/2017] sulfamethoxazole-trimethoprim 800-160mg per tablet 1 tablet Every Mon, Wed, Fri    valsartan tablet 320 mg Daily    vitamin D 1000 units tablet 5,000 Units Daily     Objective:     Vital Signs (Most Recent):  Temp: 96.8 °F (36 °C) (09/19/17 1231)  Pulse: (!) 59 (09/19/17 1231)  Resp: 16 (09/19/17 1231)  BP: 136/83 (09/19/17 1231)  SpO2: (!) 94 % (09/19/17 1231)  O2 Device (Oxygen Therapy): room air (09/19/17 1231) Vital Signs (24h Range):  Temp:  [96.7 °F (35.9 °C)-98.7 °F (37.1 °C)] 96.8 °F (36 °C)  Pulse:  [54-68] 59  Resp:  [16-19] 16  SpO2:  [94 %-98 %] 94 %  BP: (118-152)/(7-83) 136/83     Weight: 96.6 kg (212 lb 13.7 oz) (09/18/17 1449)  Body mass index is 30.54 kg/m².  Body surface area is 2.18 meters squared.      Intake/Output Summary (Last 24 hours) at 09/19/17 1311  Last data filed at 09/18/17 2035   Gross per 24 hour   Intake              510 ml   Output              750 ml   Net             -240 ml       Physical Exam   Constitutional: He is oriented to person, place, and time and well-developed, well-nourished, and in no distress.   HENT:   Head: Normocephalic.   Mouth/Throat: No oropharyngeal exudate.   Decrease in pupillary constriction left eye  EOMI   Eyes: EOM are normal. Pupils are equal, round, and reactive to light.   Neck: Normal range of motion. Neck supple.   Cardiovascular: Normal rate, regular rhythm and normal heart sounds.    Pulmonary/Chest: Effort normal and breath sounds normal.   Abdominal: Soft. Bowel sounds are normal. He exhibits no distension.       Right Side Rheumatological Exam     Examination finds the shoulder, elbow, wrist, knee, 1st PIP, 1st MCP, 2nd PIP, 2nd MCP, 3rd PIP, 3rd MCP, 4th PIP, 4th MCP, 5th PIP and 5th MCP normal.    Shoulder Exam   Sensation: normal    Knee Exam   Sensation: normal    Hip Exam   Sensation: normal    Elbow/Wrist Exam   Sensation: normal    Muscle Strength (0-5 scale):  Neck Flexion:  5  Deltoid:  5  Biceps:  5/5   Triceps:  5  : 5/5   Iliopsoas: 5  Quadriceps:  5   Distal Lower Extremity: 5    Left Side Rheumatological Exam     Examination finds the shoulder, elbow, wrist, knee, 1st PIP, 1st MCP, 2nd PIP, 2nd MCP, 3rd PIP, 3rd MCP, 4th PIP, 4th MCP, 5th PIP and 5th MCP normal.    Shoulder Exam   Sensation: normal    Knee Exam   Sensation: normal    Hip Exam   Sensation: normal    Elbow/Wrist Exam   Sensation: normal    Muscle Strength (0-5 scale):  Neck Flexion:  5  Deltoid:  5  Biceps: 5/5   Triceps:  5  :  5/5   Iliopsoas: 5  Quadriceps:  5   Distal Lower Extremity: 5      Lymphadenopathy:     He has no cervical adenopathy.   Neurological: He is alert and oriented to person, place, and time.   Reflex Scores:       Tricep reflexes are 2+ on the right side and 2+ on the left side.       Bicep reflexes are 2+ on the right side and 2+ on the left side.       Brachioradialis reflexes are 2+ on the right side and 2+ on the left side.       Patellar reflexes are 2+ on the right side and 2+ on the left side.       Achilles reflexes are 2+ on the right side and 2+ on the left side.  Skin: Skin is warm and dry.     Musculoskeletal: Normal range of motion. He exhibits no edema, tenderness or deformity.         Significant Labs:  Results for JEREMIAS LUCAS (MRN [id number]) as of 9/19/2017 13:13   Ref. Range 9/16/2017 04:14 9/19/2017 07:40   WBC Latest Ref Range: 3.90 - 12.70 K/uL 11.02 5.40   RBC Latest Ref Range: 4.60 - 6.20 M/uL 4.09 (L) 4.11 (L)   Hemoglobin Latest Ref Range: 14.0 - 18.0 g/dL 11.3 (L) 11.5 (L)   Hematocrit Latest Ref Range: 40.0 - 54.0 % 32.7 (L) 32.6 (L)   MCV Latest Ref Range: 82 - 98 fL 80 (L) 79 (L)   MCH Latest Ref Range: 27.0 - 31.0 pg 27.6 28.0   MCHC Latest Ref Range: 32.0 - 36.0 g/dL 34.6 35.3   RDW Latest Ref Range: 11.5 - 14.5 % 15.3 (H) 15.8 (H)   Platelets Latest Ref Range: 150 - 350 K/uL 196 153   MPV Latest Ref Range: 9.2 - 12.9 fL 9.6 9.4   Gran% Latest Ref Range: 38.0 - 73.0 % 95.7 (H)  85.3 (H)   Gran # Latest Ref Range: 1.8 - 7.7 K/uL 10.6 (H) 4.6   Lymph% Latest Ref Range: 18.0 - 48.0 % 3.9 (L) 6.9 (L)   Lymph # Latest Ref Range: 1.0 - 4.8 K/uL 0.4 (L) 0.4 (L)   Mono% Latest Ref Range: 4.0 - 15.0 % 0.1 (L) 7.2   Mono # Latest Ref Range: 0.3 - 1.0 K/uL 0.0 (L) 0.4   Eosinophil% Latest Ref Range: 0.0 - 8.0 % 0.0 0.0   Eos # Latest Ref Range: 0.0 - 0.5 K/uL 0.0 0.0   Basophil% Latest Ref Range: 0.0 - 1.9 % 0.1 0.0   Baso # Latest Ref Range: 0.00 - 0.20 K/uL 0.01 0.00   Results for JEREMIAS LUCAS (MRN [id number]) as of 9/19/2017 13:13   Ref. Range 9/18/2017 04:06 9/19/2017 04:26   Sodium Latest Ref Range: 136 - 145 mmol/L 140 140   Potassium Latest Ref Range: 3.5 - 5.1 mmol/L 2.9 (L) 3.4 (L)   Chloride Latest Ref Range: 95 - 110 mmol/L 105 105   CO2 Latest Ref Range: 23 - 29 mmol/L 25 26   Anion Gap Latest Ref Range: 8 - 16 mmol/L 10 9   BUN, Bld Latest Ref Range: 6 - 20 mg/dL 13 21 (H)   Creatinine Latest Ref Range: 0.5 - 1.4 mg/dL 0.7 0.9   eGFR if non African American Latest Ref Range: >60 mL/min/1.73 m^2 >60.0 >60.0   eGFR if African American Latest Ref Range: >60 mL/min/1.73 m^2 >60.0 >60.0   Glucose Latest Ref Range: 70 - 110 mg/dL 162 (H) 276 (H)   Calcium Latest Ref Range: 8.7 - 10.5 mg/dL 8.0 (L) 7.9 (L)   Alkaline Phosphatase Latest Ref Range: 55 - 135 U/L 69 88   Total Protein Latest Ref Range: 6.0 - 8.4 g/dL 5.8 (L) 5.5 (L)   Albumin Latest Ref Range: 3.5 - 5.2 g/dL 2.9 (L) 2.7 (L)   Total Bilirubin Latest Ref Range: 0.1 - 1.0 mg/dL 0.6 0.6   AST Latest Ref Range: 10 - 40 U/L 15 130 (H)   ALT Latest Ref Range: 10 - 44 U/L 17 119 (H)           Assessment/Plan:     CNS vasculitis    This is a 56 yo M with poorly controlled DM2, HLD, HTN, CVA, CHASE, internuclear ophthalmoplegia of the L eye, who presents with findings of small acute lacunar infarct as well as extensive increased signal intensity involving the periventricular white matter compatible with demyelinating disease versus vasculitis.  Symptoms include confusion, drowsiness, and at times dizziness and double vision. Had rheumatological work-up which was negative in May. Patient with no systemic systemic symptoms of vasculitis at this time. Tried cyclophosphamide with some improvement in symptoms but has been unable to receive 3rd dose due to neutropenia. Differentials include primary CNS vasculitis vs MS vs atherosclerosis. Does not have headaches which is a common feature of CNS vasculitis.     Plan  - agree with cyclophosphamide at lower dose. Patient received 600mg/m2 yesterday and tolerated treatment well.  Recommend checking CBC at 7, 10, and 14 days. Would continue for total of 3-6 months. Then, patient would need maintenance treatment with azathioprine or mycophenolate.  - continue with prednisone 60mg daily with gradual- close monitoring for elevated sugar levels.  - recommend started on H2 blocker while on prednisone  - Bactrim DS M-W-F for PJP ppx  - 1200 mg dietary calcium, vitamin D 1000 units daily   - TPMT ordered. HBcAB and HBsAB negative (will need vaccine). Should follow-up with PCP/ID as outpatient to make sure he is up to date with all vaccinations.  - DEXA scan as outpatient                 Zhao Barth MD  Rheumatology  [facility]

## 2017-09-19 NOTE — ASSESSMENT & PLAN NOTE
-Baseline AST and ALT in 10-20 range on current hospitalization and majority of lab data in this patient  -John to ,  09/18/17--only new medications cyclophosphamide, prednisone  -Patient has tolerated prednisone well in the past, less likely related to prednisone use but will follow CMP  -Discussed with Hem/Onc, note transaminitis can be seen with cyclophosphamide.  Will follow CMP.  -Patient asymptomatic at this time, bilirubin wnl.

## 2017-09-19 NOTE — SUBJECTIVE & OBJECTIVE
NIH Stroke Scale:  Interval: 7 days or at discharge (whichever comes first)  Level of Consciousness: 0 - alert  LOC Questions: 0 - answers both correctly  LOC Commands: 0 - performs both correctly  Best Gaze: 0 - normal  Visual: 0 - no visual loss  Facial Palsy: 0 - normal  Motor Left Arm: 0 - no drift  Motor Right Arm: 0 - no drift  Motor Left Le - no drift  Motor Right Le - no drift  Limb Ataxia: 0 - absent  Sensory: 0 - normal  Best Language: 0 - no aphasia  Dysarthria: 0 - normal articulation  Extinction and Inattention: 0 - no neglect  NIH Stroke Scale Total: 0  Jarod Coma Scale:  Best Eye Response: 4 - spontaneous  Best Motor Response: 6 - obeys commands  Best Verbal Response: 5 - oriented  [place] Coma Scale Total: 15  Modified [place] Scale:   Timeline: At discharge  Modified [place] Score: 1 - no significant disability

## 2017-09-19 NOTE — ASSESSMENT & PLAN NOTE
-Counseled on diet, exercise  -Also informed of steroid side effect of weight gain, will attempt to taper--long term plan per Dr. Olguin and Dr. Love

## 2017-09-19 NOTE — ASSESSMENT & PLAN NOTE
Lab Results   Component Value Date     (H) 09/19/2017     (H) 09/19/2017    ALKPHOS 88 09/19/2017    BILITOT 0.6 09/19/2017     Optimize DM control

## 2017-09-19 NOTE — ASSESSMENT & PLAN NOTE
This is a 58 yo M with poorly controlled DM2, HLD, HTN, CVA, CHASE, internuclear ophthalmoplegia of the L eye, who presents with findings of small acute lacunar infarct as well as extensive increased signal intensity involving the periventricular white matter compatible with demyelinating disease versus vasculitis. Symptoms include confusion, drowsiness, and at times dizziness and double vision. Had rheumatological work-up which was negative in May. Patient with no systemic systemic symptoms of vasculitis at this time. Tried cyclophosphamide with some improvement in symptoms but has been unable to receive 3rd dose due to neutropenia. Differentials include primary CNS vasculitis vs MS vs atherosclerosis. Does not have headaches which is a common feature of CNS vasculitis.     Plan  - agree with cyclophosphamide at lower dose. Patient received 600mg/m2 yesterday and tolerated treatment well.  Recommend checking CBC at 7, 10, and 14 days. Would continue for total of 3-6 months. Then, patient would need maintenance treatment with azathioprine or mycophenolate.  - continue with prednisone 60mg daily with gradual- close monitoring for elevated sugar levels.  - recommend started on H2 blocker while on prednisone  - Bactrim DS M-W-F for PJP ppx  - 1200 mg dietary calcium, vitamin D 1000 units daily   - TPMT ordered. HBcAB and HBsAB negative (will need vaccine). Should follow-up with PCP/ID as outpatient to make sure he is up to date with all vaccinations.  - DEXA scan as outpatient

## 2017-09-19 NOTE — PT/OT/SLP PROGRESS
"Physical Therapy  Treatment    Bessy Nunez   MRN: [id number]   Admitting Diagnosis: Lacunar stroke of left subthalamic region    PT Received On: 09/19/17  PT Start Time: 1333     PT Stop Time: 1411    PT Total Time (min): 38 min       Billable Minutes:  Gait Training 15 and Therapeutic Activity 15    Treatment Type: Treatment  PT/PTA: PTA     PTA Visit Number: 2       General Precautions: Standard, aspiration, fall, seizure, nectar thick  Orthopedic Precautions: N/A   Braces: N/A         Subjective:  Communicated with RN (Mya) prior to session.  Pt agreeable to PT session.  "I'm tired today"    Pain/Comfort  Pain Rating 1: 0/10  Pain Rating Post-Intervention 1: 0/10    Objective:   Patient found with: bed alarm, telemetry, peripheral IV (pt found sup in bed with wife present)  2 attempts for tx session 2* pt refused stating "Please come back later" at 10:41 am        FUNCTIONAL MOBILITY    Bed Mobility (with vc's for sequencing and safe technique of functional mobility):        Sup > sit at the EOB with mod I with HOB elevated        Sit > sup with mod I with HOB elevated        Scooting hips to the EOB upon sitting with sup       Pt performs scooting to HOB via bridging with SBA     Transfers  (vc's for hand placement and safety of task)       Sit < > stand from EOB with SC requiring SBA for safety          Gait        Distance: 180ft        Assistive Device: SC       Assistance Required: SBA for safety.  Pt had 1 LOB to the L requiring min A to recover       Verbal Cues required: for justin, safety       Gait Deviations: Pt demonstrates increased justin, i                             Decreased B step length, decreased stride length,                               decreased toe-to-floor clearance, decreased weight-shifting ability,                             foot flat      THERAPEUTIC ACTIVITIES     SITTING        Pt tolerates sitting at the EOB with sup for safety     STANDING        Pt tolerates standing " with SC requiring SBA for safety with vc's      Education:  Education provided to pt regarding: safety during gait. Verbalized understanding.     Education provided to patient and wife regarding safety at home/community.  Discussed identification of stroke warning signs and risk factors using Stroke Patient Education Guide booklet.   All questions and concerns addressed by PTA within scope of practice    Whiteboard updated with correct mobility information. RN/PCT notified.  Pt safe to amb with RN/PCT. Use SC & 1 person assist.    AM-PAC 6 CLICK MOBILITY  How much help from another person does this patient currently need?   1 = Unable, Total/Dependent Assistance  2 = A lot, Maximum/Moderate Assistance  3 = A little, Minimum/Contact Guard/Supervision  4 = None, Modified [place]/Independent    Turning over in bed (including adjusting bedclothes, sheets and blankets)?: 4  Sitting down on and standing up from a chair with arms (e.g., wheelchair, bedside commode, etc.): 4  Moving from lying on back to sitting on the side of the bed?: 4  Moving to and from a bed to a chair (including a wheelchair)?: 4  Need to walk in hospital room?: 3  Climbing 3-5 steps with a railing?: 3  Total Score: 22    AM-PAC Raw Score CMS G-Code Modifier Level of Impairment Assistance   6 % Total / Unable   7 - 9 CM 80 - 100% Maximal Assist   10 - 14 CL 60 - 80% Moderate Assist   15 - 19 CK 40 - 60% Moderate Assist   20 - 22 CJ 20 - 40% Minimal Assist   23 CI 1-20% SBA / CGA   24 CH 0% Independent/ Mod I     Patient left supine with all lines intact, call button in reach, bed alarm on, RN notified and wife present.    Assessment:  Bessy Nunez is a 57 y.o. male with a medical diagnosis of Lacunar stroke of left subthalamic region and presents with fatigue and impaired balance requiring assistance to prevent fall during gait.    Rehab identified problem list/impairments: Rehab identified problem list/impairments: weakness, impaired  endurance, impaired self care skills, impaired functional mobilty, gait instability, impaired balance, impaired cognition    Rehab potential is good.    Activity tolerance: Good    Discharge recommendations: Discharge Facility/Level Of Care Needs: home health PT     Barriers to discharge: Barriers to Discharge: None    Equipment recommendations: Equipment Needed After Discharge: cane, straight     GOALS:    Physical Therapy Goals     Not on file          Multidisciplinary Problems (Resolved)        Problem: Physical Therapy Goal    Goal Priority Disciplines Outcome Goal Variances Interventions   Physical Therapy Goal   (Resolved)     PT/OT, PT Outcome(s) achieved     Description:  Goals to be met by: 2017     Patient will increase functional independence with mobility by performin. Supine to sit with Modified [place]    MET 2017  2. Sit to supine with Modified [place]     MET 2017  3. Sit to stand transfer with Supervision     NOT MET  4. Bed to chair transfer with Supervision without AD   NOT MET  5. Gait  x 300 feet with Supervision using Single-point Cane .   NOT MET   6. Lower extremity exercise program x20 reps per handout, with independence    NOT MET                    PLAN:  Pt was discharged from hospital on this date.  Full discharge summary to follow.       Plan of Care reviewed with: patient, spouse         Inés Alfonso, PTA  2017

## 2017-09-19 NOTE — DISCHARGE SUMMARY
"[facility]  Vascular Neurology  Comprehensive Stroke Center  Discharge Summary     Summary:     Admit Date: 9/14/2017  9:53 PM    Discharge Date and Time:  09/19/2017 3:44 PM    Attending Physician: Edgardo Forrest DO     Discharge Provider: Kae Armstrong MD    History of Present Illness: Patient is a 57 y.o. male with significant past medical history of HTN, HLD, DM II, CNS vasculitis-not currently on therapy, discharged in June 2017 for MS vs cardio-embolic vs watershade stroke, recently admitted and discharged for AMS presented to hospital as a transfer from [facility] for evaluation of acute stroke revealed on outpatient MRI imaging.  The patient has no complaints at this time.  The patient's wife reports he was very "sleepy and hard to wake up" yesterday from ~5 pm to ~11 pm.  The patient denies weakness, HA, change in vision.  He and his wife endorse intermittent confusion/difficulty with tasks or conversation that are more complex in nature.  The patient recently discontinued chemotherapy for CNS vasculitis due to a decrease in his WBC count.  His wife states he hallucinates on high dose prednisone.  The patient will be admitted to Vascular Neurology for further evaluation of his symptoms.    Hospital Course (synopsis of major diagnoses, care, treatment, and services provided during the course of the hospital stay): 56 yo M with PMHx of HTN, HLD, DM II, CNS vasculitis--not currently on therapy, discharged in June 2017 for MS vs cardio-embolic vs watershade stroke, recently admitted and discharged for AMS presented to hospital as a transfer from [facility] for evaluation of acute stroke revealed on outpatient MRI imaging. Completed 3 d IV solumedrol, Rheumatology and Dr. Love recommend starting cyclophosphamide 600 mg/m2 with Hem/Onc consulted for assistance with infusion regimen and getting patient into outpatient infusion center for regular treatments.  Endocrine to " provide recommendations for insulin on discharge.  Plan for monthly cyclophosphamide with Dr. Love and Dr. Olguin on board as well as assistance from Rheumatology and ID for vaccination and antibiotic ppx recommendations.  Follow up arranged with Dr. Olguin/Dr. Love in Neurology Clinic, PCP, Rheumatology, and Endocrine.  Patient's wife also requested [facility] Aide to assist with insulin and BG checks for first couple weeks while they are getting used to his new medication regimen.    NIH Stroke Scale:  Interval: 7 days or at discharge (whichever comes first)  Level of Consciousness: 0 - alert  LOC Questions: 0 - answers both correctly  LOC Commands: 0 - performs both correctly  Best Gaze: 0 - normal  Visual: 0 - no visual loss  Facial Palsy: 0 - normal  Motor Left Arm: 0 - no drift  Motor Right Arm: 0 - no drift  Motor Left Le - no drift  Motor Right Le - no drift  Limb Ataxia: 0 - absent  Sensory: 0 - normal  Best Language: 0 - no aphasia  Dysarthria: 0 - normal articulation  Extinction and Inattention: 0 - no neglect  NIH Stroke Scale Total: 0  Jarod Coma Scale:  Best Eye Response: 4 - spontaneous  Best Motor Response: 6 - obeys commands  Best Verbal Response: 5 - oriented  Narka Coma Scale Total: 15  Modified D[place]ouzett Scale:   Timeline: At discharge  Modified Gunjan Score: 1 - no significant disability          Assessment/Plan:     Interventions: None    Complications: None    Research Candidate?:  No--> CNS Vasculitis, received steroids and cyclophosphamide    Neurological deficit at discharge: Confusion     Disposition: Home or Self Care    Final Active Diagnoses:    Diagnosis Date Noted POA    PRINCIPAL PROBLEM:  Lacunar stroke of left subthalamic region [I63.50] 2017 Yes    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] 2017 Yes    Transaminitis [R74.0] 2017 Unknown    NAFLD (nonalcoholic fatty liver disease) [K76.0] 10/11/2013 Yes    Type 2 diabetes mellitus with hyperglycemia, with long-term current  use of insulin [E11.65, Z79.4] 10/11/2013 Not Applicable    JOHN (obstructive sleep apnea) [G47.33] 10/11/2013 Yes    Obesity (BMI 30-39.9) [E66.9] 10/11/2013 Yes    Mixed hyperlipidemia [E78.2] 11/09/2012 Yes    Essential hypertension [I10] 11/09/2012 Yes    Adverse effect of glucocorticoids and synthetic analogues, initial encounter [T38.0X5A] 09/15/2017 Yes      Problems Resolved During this Admission:    Diagnosis Date Noted Date Resolved POA    Hypokalemia [E87.6] 06/04/2017 09/17/2017 Yes    Hypomagnesemia [E83.42] 06/04/2017 09/17/2017 Yes     * Lacunar stroke of left subthalamic region    57 y.o. male with significant past medical history of HTN, HLD, DM II, CNS vasculitis (not currently on therapy), discharged in June 2017 for MS vs cardio-embolic vs watershade stroke. Cerebral angiogram in May 2017 consistent with cerebral vasculitis vs multifocal atherosclerotic disease. Recently admitted and discharged for AMS. Pt presented to hospital as a transfer from [facility] for evaluation of acute stroke revealed on outpatient MRI imaging.    - Antithrombotics for secondary stroke prevention: ASA 81 mg po qd  - Statins for secondary stroke prevention, HLD: Atorvastatin 40 mg po qd  - Aggressive risk factor modification: HTN, DM II, HLD, Obesity  - Rehab Efforts: PT/OT/SLP--HHC with PT/OT/SLP/HHC Aide orders placed  - VTE Prophylaxis: Heparin 5000 U q8h        CNS vasculitis    -Cerebral angiogram 05/2017 c/w cerebral vasculitis vs multifocal atherosclerotic disease.  -Signs of demyelinating disease vs vasculitis on imaging. Pt sees Dr. Love, recently taken off chemo 2/2 neutropenia.     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  On prednisone 60 mg po qd with taper to 40 mg po qd per Dr. Olguin's last note.  -Seen by Rheum (Dr. Durant) 05/2017 with autoimmune workup negative at that time.  Will have patient follow up with Dr. Durant.  -IP consult to Rheumatology--recommended  cyclophosphamide vs rituximab.  Discussed with Dr. Love, decided on cyclophosphamide 600 mg/m2--dose 09/18/17.  -Follow up to be established with Dr. Love/Dr. Olguin, Dr. Durant, Endocrine, and ID for recs for vaccination/antibiotic ppx in setting of immunosuppression.        Transaminitis    -Baseline AST and ALT in 10-20 range on current hospitalization and majority of lab data in this patient  -John to ,  09/18/17--only new medications cyclophosphamide, prednisone  -Patient has tolerated prednisone well in the past, less likely related to prednisone use but will follow CMP  -Discussed with Hem/Onc, note transaminitis can be seen with cyclophosphamide.  Will follow CMP.  -Patient asymptomatic at this time, bilirubin wnl.        NAFLD (nonalcoholic fatty liver disease)    -Baseline per patient and his wife--since 06/12/15 liver biopsy  -Transaminitis 09/19/17 with regular AST, ALT on current hospitalization otherwise  -Follow up CMP weekly, follow up with PCP for management         Obesity (BMI 30-39.9)    -Counseled on diet, exercise  -Also informed of steroid side effect of weight gain, will attempt to taper--long term plan per Dr. Olguin and Dr. Love        Type 2 diabetes mellitus with hyperglycemia, with long-term current use of insulin    -Stroke risk factor.  Recent increased BG 2/2 high dose steroid use.  -Endocrinology following and providing recommendations for BG management, appreciate recs   -Discharging on detemir 25 U bid, aspart 15 U tidwm   -[facility] aide to assist with insulin administration while patient's wife is still learning, orders placed   -Plan for Endocrine follow up with MORAIMA Steele          Essential hypertension    -Stroke risk factor.  Recommend SBP<180.  Continue home meds.  BP over past 24 h:  BP: Systolic 122 152   BP: Diastolic 7 83             Mixed hyperlipidemia    -Stroke risk factor.  LDL 69.2, continue home dose atorvastatin 40 mg po qd.            Recommendations:      Post-discharge complication risks: None    Stroke Education given to: patient and family    Follow-up in Stroke Clinic in 30 days    Discharge Plan:  Antithrombotics: Aspirin 81 mg  Statin: Atorvastatin 40 mg  Aggresive risk factor modification:  Hypertension, Diabetes, High Cholesterol, Diet, Exercise and Obesity    Follow Up:  Follow-up Information     Schedule an appointment as soon as possible for a visit with Edgar Nova MD.    Specialty:  Internal Medicine  Why:  As needed for normal follow up  Contact information:  [address]  [place] [address]  [phone]             Sahna Love MD. Schedule an appointment as soon as possible for a visit in 1 month.    Specialty:  Neurology  Why:  As needed for follow up of inpatient stay for CNS vasculitis, resuming cyclophosphamide therapy  Contact information:  [address] [facility] [address] [phone]             Schedule an appointment as soon as possible for a visit with Kay Troy MD.    Specialty:  Rheumatology  Why:  As needed for follow up of CNS vasculitis on cyclophosphamide, prednisone taper  Contact information:  [address]  [place] [address] [phone]             Saima Olguin II, MD.    Specialty:  Neurology  Why:  As needed--seen with Dr. Love in the past  Contact information[facility]  [facility] [facility] [address] [phone]             [facility] - Infectious Diseases.    Specialty:  Infectious Diseases  Why:  As needed for ensuring appropriate ppx and vaccination in setting of possible chronic immunosuppression on monthly cyclophosphamide and prednisone  Contact information:  [address] [facility] [address] [phone]  Additional information:  1st Floor - [facility]           Schedule an appointment as soon as possible for a visit with [facility] - Gastroenterology.    Specialty:  Gastroenterology  Why:  As needed for follow up of reported gastric ulcers  Contact  information:  [address]  [facility] [address] [phone]  Additional information:  [facility] - 4th Floor               Patient Instructions:     CPAP FOR HOME USE   Order Specific Question Answer Comments   Type: CPAP    CPAP setting (cmH20): 5    Length of need (1-99 months): 99    Humidification: Heated    Type of mask: FFM    Headgear? Yes    Tubing? Yes    Humidifier chamber? Yes    Chin strap? Yes    Filters? Yes      CBC ONCOLOGY   Standing Status: Standing Number of Occurrences: 4 Standing Exp. Date: 11/18/18     COMPREHENSIVE METABOLIC PANEL   Standing Status: Standing Number of Occurrences: 9 Standing Exp. Date: 10/19/17     COMPREHENSIVE METABOLIC PANEL   Standing Status: Standing Number of Occurrences: 12 Standing Exp. Date: 11/18/18     Ambulatory Referral to Chemotherapy Infusion   Referral Priority: Routine Referral Type: Consultation   Referral Reason: Specialty Services Required    Requested Specialty: Hematology and Oncology    Number of Visits Requested: 1      Ambulatory consult to Endocrinology   Referral Priority: Routine Referral Type: Consultation   Referred to Provider: JOAN ARIAS Requested Specialty: Endocrinology   Number of Visits Requested: 1      SUBSEQUENT HOME HEALTH ORDERS   Order Comments: Subsequent Home Health Orders    Current Medications:  Current Facility-Administered Medications:  0.9%  NaCl infusion, , Intravenous, Continuous, Georgette Leon NP, Last Rate: 125 mL/hr at 09/18/17 1700  alteplase injection 2 mg, 2 mg, Intra-Catheter, PRN, Nkechi Perez MD  aspirin EC tablet 81 mg, 81 mg, Oral, Daily, Georgette Leon NP, 81 mg at 09/19/17 0802  atenolol tablet 50 mg, 50 mg, Oral, Daily, Georgette Leon NP, 50 mg at 09/19/17 0802  atorvastatin tablet 40 mg, 40 mg, Oral, Daily, Georgette Leon NP, 40 mg at 09/19/17 0802  calcium carbonate tablet 1,000 mg, 1,000 mg, Oral, Once, Kae Armstrong MD  dextrose 50% injection 12.5 g, 12.5 g,  Intravenous, PRN, Adrian Valdovinos DNP, NP  dextrose 50% injection 25 g, 25 g, Intravenous, PRN, Adrian Valdovinos DNP, NP  diltiaZEM 24 hr capsule 240 mg, 240 mg, Oral, Daily, Georgette Leon NP, 240 mg at 09/19/17 0803  glucagon (human recombinant) injection 1 mg, 1 mg, Intramuscular, PRN, Adrian Valdovinos DNP, NP  glucose chewable tablet 16 g, 16 g, Oral, PRN, Adrian Valdovinos DNP, NP  glucose chewable tablet 24 g, 24 g, Oral, PRN, Adrian Valdovinos DNP, NP  heparin (porcine) injection 5,000 Units, 5,000 Units, Subcutaneous, Q8H, Georgette Leon NP, 5,000 Units at 09/19/17 0634  heparin, porcine (PF) 100 unit/mL injection flush 500 Units, 500 Units, Intravenous, PRN, Nkechi Perez MD  insulin aspart pen 0-5 Units, 0-5 Units, Subcutaneous, QID (AC + HS) PRN, Adrian Valdovinos DNP, NP  insulin aspart pen 15 Units, 15 Units, Subcutaneous, TIDWM, Adrian Valdovinos DNP, MORAIMA  insulin detemir pen 25 Units, 25 Units, Subcutaneous, BID, Adrian Valdovinos DNP, MORAIMA  labetalol injection 10 mg, 10 mg, Intravenous, Q6H PRN, Georgette Leon NP  levetiracetam tablet 500 mg, 500 mg, Oral, BID, Georgette Leon NP, 500 mg at 09/19/17 0802  omega-3 fatty acids-fish oil 340-1,000 mg capsule 1 capsule, 1 capsule, Oral, BID, Georgette Leon NP, 1 capsule at 09/19/17 0802  ondansetron disintegrating tablet 8 mg, 8 mg, Oral, Q8H PRN, Georgette Leon NP  ondansetron injection 4 mg, 4 mg, Intravenous, Q12H PRN, Georgette Leon NP  pantoprazole EC tablet 40 mg, 40 mg, Oral, Daily, Georgette Leon NP, 40 mg at 09/19/17 0803  predniSONE tablet 60 mg, 60 mg, Oral, Daily, Kae Armstrong MD, 60 mg at 09/19/17 1230  sertraline tablet 150 mg, 150 mg, Oral, Daily, Georgette Leon NP, 150 mg at 09/19/17 0803  sodium chloride 0.9% bolus 500 mL, 500 mL, Intravenous, Continuous PRN, Georgette Leon NP  sodium chloride 0.9% flush 10 mL, 10 mL, Intravenous, PRN, Nkechi Perez MD  sodium chloride 0.9% flush 3 mL, 3 mL,  Intravenous, Q8H, Georgette Leon NP, 3 mL at 09/19/17 0600  [START ON 9/20/2017] sulfamethoxazole-trimethoprim 800-160mg per tablet 1 tablet, 1 tablet, Oral, Every Mon, Wed, Fri, Kae Armstrong MD  valsartan tablet 320 mg, 320 mg, Oral, Daily, Georgette Leon NP, 320 mg at 09/19/17 0802  vitamin D 1000 units tablet 5,000 Units, 5,000 Units, Oral, Daily, Georgette Leon NP, 5,000 Units at 09/19/17 0802        Nursing:   Diabetes education  Weekly CBC, CMP x 4 wks    Diet:   Diabetic 1800    Activities:   Up with assistance, remainder recommendations per PT/OT    Labs:  Weekly CBC, CMP for electrolyte and WBC monitoring    Referrals/ Consults  PT  OT  SLP  Home Health Aide/Skilled Nurse    Home Health Aide:  Monitoring with weekly CBC, CMP.   Re-education regarding insulin and blood glucose measurement.   Order Specific Question Answer Comments   What Home Health Agency is the patient currently using? [facility]        Medications:  Reconciled Home Medications:   Current Discharge Medication List      START taking these medications    Details   calcium carbonate (OS-DONNA) 500 mg calcium (1,250 mg) tablet Take 2 tablets (1,000 mg total) by mouth once.  Refills: 0      insulin detemir (LEVEMIR FLEXTOUCH) 100 unit/mL (3 mL) SubQ InPn pen Inject 25 Units into the skin 2 (two) times daily.  Qty: 1 Box, Refills: 3      predniSONE (DELTASONE) 20 MG tablet Take 3 tablets daily (60 mg) for 2 wks. Then 2 tablets daily (40 mg). See Dr. Olguin, Dr. Love to adjust dose as needed long term.  Qty: 90 tablet, Refills: 2      sulfamethoxazole-trimethoprim 800-160mg (BACTRIM DS) 800-160 mg Tab Take 1 tablet by mouth every Mon, Wed, Fri.  Qty: 12 tablet, Refills: 3         CONTINUE these medications which have CHANGED    Details   insulin aspart (NOVOLOG) 100 unit/mL InPn pen Inject 15 Units into the skin 3 (three) times daily with meals.  Qty: 1 Box, Refills: 3         CONTINUE these medications which have  "NOT CHANGED    Details   aspirin (ECOTRIN) 81 MG EC tablet Take 81 mg by mouth once daily.      atenolol (TENORMIN) 50 MG tablet Take 1 tablet (50 mg total) by mouth once daily.  Qty: 90 tablet, Refills: 3    Associated Diagnoses: Essential hypertension      atorvastatin (LIPITOR) 40 MG tablet Take 1 tablet (40 mg total) by mouth once daily.  Qty: 90 tablet, Refills: 3    Associated Diagnoses: Other and unspecified hyperlipidemia      diltiaZEM (DILACOR XR) 240 MG CDCR Take 1 capsule (240 mg total) by mouth once daily.  Qty: 90 capsule, Refills: 3    Associated Diagnoses: Essential hypertension      insulin needles, disposable, (BD ULTRA-FINE ANA PEN NEEDLES) 32 x 5/32 " Ndle Inject twice daily  Qty: 100 each, Refills: 3      levetiracetam (KEPPRA) 500 MG Tab Take 1 tablet (500 mg total) by mouth 2 (two) times daily.  Qty: 60 tablet, Refills: 2      liraglutide 0.6 mg/0.1 mL, 18 mg/3 mL, subq PNIJ (VICTOZA 3-TOMASZ) 0.6 mg/0.1 mL (18 mg/3 mL) PnIj Inject 1.8 mg into the skin once daily.  Qty: 9 mL, Refills: 11      metformin (GLUCOPHAGE-XR) 500 MG 24 hr tablet Take 2 tablets (1,000 mg total) by mouth 2 (two) times daily with meals.  Qty: 360 tablet, Refills: 0      omega-3 fatty acids-vitamin E (FISH OIL) 1,000 mg Cap Take 1 capsule by mouth 2 (two) times daily.  Refills: 0    Associated Diagnoses: Hypertriglyceridemia      ranitidine (ZANTAC) 150 MG tablet Take 1 tablet (150 mg total) by mouth 2 (two) times daily.  Qty: 60 tablet, Refills: 11      sertraline (ZOLOFT) 100 MG tablet 1 and half tablet daily  Qty: 135 tablet, Refills: 3    Associated Diagnoses: Depressive disorder, not elsewhere classified      valsartan (DIOVAN) 320 MG tablet Take 1 tablet (320 mg total) by mouth once daily.  Qty: 90 tablet, Refills: 3    Associated Diagnoses: Essential hypertension      vitamin D 1000 units Tab Take 5 tablets (5,000 Units total) by mouth once daily.           Kae Armstrong MD  Comprehensive Stroke " [place]  Department of Vascular Neurology   [facility]

## 2017-09-20 ENCOUNTER — PATIENT OUTREACH (OUTPATIENT)
Dept: ADMINISTRATIVE | Facility: CLINIC | Age: 57
End: 2017-09-20

## 2017-09-20 ENCOUNTER — TELEPHONE (OUTPATIENT)
Dept: NEUROSURGERY | Facility: HOSPITAL | Age: 57
End: 2017-09-20

## 2017-09-20 DIAGNOSIS — E11.65 TYPE 2 DIABETES MELLITUS WITH HYPERGLYCEMIA, WITH LONG-TERM CURRENT USE OF INSULIN: ICD-10-CM

## 2017-09-20 DIAGNOSIS — I77.6 CNS VASCULITIS: Primary | ICD-10-CM

## 2017-09-20 DIAGNOSIS — Z79.4 TYPE 2 DIABETES MELLITUS WITH HYPERGLYCEMIA, WITH LONG-TERM CURRENT USE OF INSULIN: ICD-10-CM

## 2017-09-20 RX ORDER — LEVETIRACETAM 500 MG/1
500 TABLET ORAL 2 TIMES DAILY
Qty: 60 TABLET | Refills: 2 | Status: SHIPPED | OUTPATIENT
Start: 2017-09-20 | End: 2018-01-14 | Stop reason: SDUPTHER

## 2017-09-20 RX ORDER — METFORMIN HYDROCHLORIDE 500 MG/1
1000 TABLET, EXTENDED RELEASE ORAL 2 TIMES DAILY WITH MEALS
Qty: 360 TABLET | Refills: 1 | Status: SHIPPED | OUTPATIENT
Start: 2017-09-20 | End: 2018-04-27 | Stop reason: SDUPTHER

## 2017-09-20 NOTE — PATIENT INSTRUCTIONS
Discharge Instructions for Stroke  You have been diagnosed with a stroke, or with a TIA (transient ischemic attack). Or you have been identified as having a high risk for stroke. During a stroke, blood stops flowing to part of your brain. This can damage areas in the brain that control other parts of the body. Symptoms after a stroke depend on which part of the brain has been affected.  Stroke risk factors  Once youve had a stroke, youre at greater risk for another one. Listed below are some other factors that can increase your risk for a stroke:  · High blood pressure  · High cholesterol  · Cigarette or cigar smoking  · Diabetes  · Carotid or other artery disease  · Atrial fibrillation, atrial flutter, or other heart disease  · Not being physically active  · Obesity  · Certain blood disorders (such as sickle cell anemia)  · Excessive alcohol use  · Abuse of street drugs  · Race  · Gender  · Family history of stroke  · Diet high in salty, fried, or greasy foods  Changes in daily living  Doing your regular tasks may be difficult after youve had a stroke, but you can learn new ways to manage your daily activities. In fact, doing daily activities may help you to regain muscle strength and bring back function to affected limbs. Be patient, give yourself time to adjust, and appreciate the progress you make.  Daily activities  You may be at risk of falling. Make changes to your home to help you walk more easily. A therapist will decide if you need an assistive device to walk safely.  You may need to see an occupational therapist or physical therapist to learn new ways of doing things. For example, you may need to make adjustments when bathing or dressing:  Tips for showering or bathing  · Test the water temperature with a hand or foot that was not affected by the stroke.  · Use grab bars, a shower seat, a hand-held showerhead, and a long-handled brush.  Tips for getting dressed  · Dress while sitting, starting with  the affected side or limb.  · Wear shirts that pull easily over your head. Wear pants or skirts with elastic waistbands.  · Use zippers with loops attached to the pull tabs.  Lifestyle changes  · Take your medicines exactly as directed. Dont skip doses.  · Begin an exercise program. Ask your provider how to get started. Also ask how much activity you should try to get on a daily or weekly basis. You can benefit from simple activities such as walking or gardening.  · Limit alcohol intake. Men should have no more than 2 alcoholic drinks a day. Women should limit themselves to 1 alcoholic drink per day.  · Know your cholesterol level. Follow your providers recommendations about how to keep cholesterol under control.  · If you are a smoker, quit now. Join a stop-smoking program to improve your chances of success. Ask your provider about medicines or other methods to help you quit.  · Learn stress management techniques to help you deal with stress in your home and work life.  Diet  Your healthcare provider will give you information on dietary changes that you may need to make, based on your situation. Your provider may recommend that you see a registered dietitian for help with diet changes. Changes may include:  · Reducing fat and cholesterol intake  · Reducing salt (sodium) intake, especially if you have high blood pressure  · Eating more fresh vegetables and fruits  · Eating more lean proteins, such as fish, poultry, and beans and peas (legumes)  · Eating less red meat and processed meats  · Using low-fat dairy products  · Limiting vegetable oils and nut oils  · Limiting sweets and processed foods such as chips, cookies, and baked goods  Follow-up care  · Keep your medical appointments. Close follow-up is important to stroke rehabilitation and recovery.  · Some medicines require blood tests to check for progress or problems. Keep follow-up appointments for any blood tests ordered by your providers.  When to call  911  Call 911 right away if you have any of the following symptoms of stroke:  · Weakness, tingling, or loss of feeling on one side of your face or body  · Sudden double vision or trouble seeing in one or both eyes  · Sudden trouble talking or slurred speech  · Trouble understanding others  · Sudden, severe headache  · Dizziness, loss of balance, or a sense of falling  · Blackouts or seizures      F.A.S.T. is an easy way to remember the signs of stroke. When you see these signs, you know that you need to call 911 fast.  F.A.S.T. stands for:  · F is for face drooping. One side of the face is drooping or numb. When the person smiles, the smile is uneven.  · A is for arm weakness. One arm is weak or numb. When the person lifts both arms at the same time, one arm may drift downward.  · S is for speech difficulty. You may notice slurred speech or trouble speaking. The person can't repeat a simple sentence correctly when asked.  · T is for time to call 911. If someone shows any of these symptoms, even if they go away, call 911 immediately. Make note of the time the symptoms first appeared.  Date Last Reviewed: 8/26/2015 © 2000-2017 [employer]. [address] [place] [address], [place] [address]. All rights reserved. This information is not intended as a substitute for professional medical care. Always follow your healthcare professional's instructions.

## 2017-09-20 NOTE — TELEPHONE ENCOUNTER
----- Message from Radha Thakur sent at 9/20/2017  1:35 PM CDT -----  Contact: lenin Godfrey is requesting is refill for levetiracetam (KEPPRA) 500 MG Tab & metformin (GLUCOPHAGE-XR) 500 MG 24 hr tablet. [phone]

## 2017-09-20 NOTE — TELEPHONE ENCOUNTER
Called number on file.  Spoke with patient.  Risk factors specific to patient for stroke discussed with teach back implemented.  Patient verbalized understanding of discharge instructions and medications.  Patient was asked about discharge appointments and follow up care.  Follow appointment scheduled for 10/31/2017 at 1020. Warning signs discussed with teach back discussion method implemented.  Notified to seek immediate medical help via 911 if new or worsening stroke symptoms occur.  Patient relayed no new questions or comments at this time.  Instructed to call [facility] with any further questions.

## 2017-09-20 NOTE — PHYSICIAN QUERY
PT Name: Bessy Nunez  MR #: [id number]    Physician Query Form - Neurological Condition Clarification       CDS/: Anai Chicas               Contact information:    This form is a permanent document in the medical record.     Query Date: September 20, 2017    By submitting this query, we are merely seeking further clarification of documentation. Please utilize your independent clinical judgment when addressing the question(s) below.    The Medical record contains the following:   Indicators   Supporting Clinical Findings Location in Medical Record   X AMS, Confusion, LOC, etc.  He and his wife endorse intermittent confusion/difficulty with tasks or conversation that are more complex in nature   H&P 9/14   X Acute / Chronic Illness Acute encephalopathy, cognitive deficits   Vascular Neurology PN 9/18    Radiology Findings small acute lacunar infarct adjacent to the left frontal horn.    Extensive increased signal intensity involving the periventricular white matter compatible with demyelinating disease versus vasculitis. MRI 9/14    Electrolyte Imbalance Hypokalemia   Hypomagnesemia  DC Summary 9/19    Medication      Treatment     X Other He is definitely encephalopathic  NAFLD   CHASE Rheumatology Consult 9/15  H&P 9/14  H&P 9/14     Provider, please specify the diagnosis or diagnoses associated with above clinical findings.    [  ] Alcoholic Encephalopathy    [  ] Hepatic Encephalopathy    [  ] Hypertensive Encephalopathy    [  ] Metabolic Encephalopathy    [  ] Toxic Encephalopathy    [  ] Traumatic Encephalopathy    [  ] Wernickes Encephalopathy    [  ] Other Encephalopathy    [ X ] Unspecified Encephalopathy    [  ] Other (please specify): _____________________________________    [  ] Clinically Undetermined      Please document in your progress notes daily for the duration of treatment until resolved, and  include in your discharge summary.

## 2017-09-21 ENCOUNTER — TELEPHONE (OUTPATIENT)
Dept: ENDOCRINOLOGY | Facility: CLINIC | Age: 57
End: 2017-09-21

## 2017-09-21 ENCOUNTER — DOCUMENTATION ONLY (OUTPATIENT)
Dept: NEUROLOGY | Facility: CLINIC | Age: 57
End: 2017-09-21

## 2017-09-21 LAB
TPMT GENOTYPE RESULT: NORMAL
TPMT INTERPRETATION: NORMAL
TPMT REVIEWED BY: NORMAL

## 2017-09-21 NOTE — TELEPHONE ENCOUNTER
Called patient's wife who stated that Adrian Valdovinos NP prescribed Levemir for patient after discharge from hospital but [facility] pharmacy will not fill it because it wasn't approved. Call disconnected, but when called back I spoke to patient. Informed him I will notify provider to see if she wants to change any medication before his visit or if he should wait until his follow up on Tues 9/26/17. Patient verbalized understanding. Called patient's wife Bekah back and she stated that patient has enough Tresiba left until follow up appointment and  is looking into why pharmacy wouldn't fill the Levemir.

## 2017-09-21 NOTE — PROGRESS NOTES
[facility]

## 2017-09-22 ENCOUNTER — TELEPHONE (OUTPATIENT)
Dept: FAMILY MEDICINE | Facility: CLINIC | Age: 57
End: 2017-09-22

## 2017-09-22 ENCOUNTER — TELEPHONE (OUTPATIENT)
Dept: RHEUMATOLOGY | Facility: CLINIC | Age: 57
End: 2017-09-22

## 2017-09-22 DIAGNOSIS — Z74.09 IMPAIRED FUNCTIONAL MOBILITY, BALANCE, GAIT, AND ENDURANCE: Primary | ICD-10-CM

## 2017-09-22 DIAGNOSIS — I77.6 CNS VASCULITIS: ICD-10-CM

## 2017-09-22 DIAGNOSIS — Z79.4 TYPE 2 DIABETES MELLITUS WITH DIABETIC POLYNEUROPATHY, WITH LONG-TERM CURRENT USE OF INSULIN: ICD-10-CM

## 2017-09-22 DIAGNOSIS — E11.42 TYPE 2 DIABETES MELLITUS WITH DIABETIC POLYNEUROPATHY, WITH LONG-TERM CURRENT USE OF INSULIN: ICD-10-CM

## 2017-09-22 RX ORDER — LANCETS 33 GAUGE
1 EACH MISCELLANEOUS 3 TIMES DAILY
Qty: 100 EACH | Refills: 11 | Status: SHIPPED | OUTPATIENT
Start: 2017-09-22 | End: 2017-09-26 | Stop reason: SDUPTHER

## 2017-09-22 NOTE — TELEPHONE ENCOUNTER
Lancets sent and referral for HH sent - already had sent orders so this was extension of previous HH orders.

## 2017-09-22 NOTE — TELEPHONE ENCOUNTER
----- Message from Zoya Meng sent at 9/21/2017 12:25 PM CDT -----  Contact: Elsy Dunne   [phone]  Pt needs a script for Lancet pen (it is covered by insurance) .Pls call [facility]  [phone]. Thanks......Mary      Also needs orders for [facility]. Elsy Dunne said that the pt can benefit from HH. Thanks.......Mary

## 2017-09-22 NOTE — TELEPHONE ENCOUNTER
Spoke to patient's wife over the phone who reports that she will be getting patient's CBC done Monday 9/25/17 (7 days after Cyclophosphamide infusion). She will also try to get labs done on 9/28/17 (10 days after infusion).     Will continue to follow.

## 2017-09-22 NOTE — TELEPHONE ENCOUNTER
Called and informed patient's wife Bekah that patient can taek Tresiba 25 units 2 times daily as provider instructed with Levemir until their visit on 9/26/17. Bekah verbalized understanding.

## 2017-09-25 NOTE — TELEPHONE ENCOUNTER
I notified the patient's wife the provider approved the prescription,and the pharmacy will notify when prescription is ready for .

## 2017-09-26 ENCOUNTER — OFFICE VISIT (OUTPATIENT)
Dept: ENDOCRINOLOGY | Facility: CLINIC | Age: 57
End: 2017-09-26
Payer: MEDICAID

## 2017-09-26 ENCOUNTER — OFFICE VISIT (OUTPATIENT)
Dept: FAMILY MEDICINE | Facility: CLINIC | Age: 57
End: 2017-09-26
Payer: MEDICAID

## 2017-09-26 ENCOUNTER — TELEPHONE (OUTPATIENT)
Dept: ENDOCRINOLOGY | Facility: CLINIC | Age: 57
End: 2017-09-26

## 2017-09-26 ENCOUNTER — TELEPHONE (OUTPATIENT)
Dept: SLEEP MEDICINE | Facility: CLINIC | Age: 57
End: 2017-09-26

## 2017-09-26 VITALS
WEIGHT: 219.13 LBS | DIASTOLIC BLOOD PRESSURE: 84 MMHG | BODY MASS INDEX: 31.37 KG/M2 | SYSTOLIC BLOOD PRESSURE: 124 MMHG | HEIGHT: 70 IN | HEART RATE: 77 BPM

## 2017-09-26 VITALS
WEIGHT: 217 LBS | OXYGEN SATURATION: 94 % | HEART RATE: 90 BPM | SYSTOLIC BLOOD PRESSURE: 106 MMHG | BODY MASS INDEX: 30.38 KG/M2 | DIASTOLIC BLOOD PRESSURE: 58 MMHG | TEMPERATURE: 98 F | HEIGHT: 71 IN

## 2017-09-26 DIAGNOSIS — R73.9 STEROID-INDUCED HYPERGLYCEMIA: ICD-10-CM

## 2017-09-26 DIAGNOSIS — I77.6 CNS VASCULITIS: ICD-10-CM

## 2017-09-26 DIAGNOSIS — Z74.09 IMPAIRED FUNCTIONAL MOBILITY, BALANCE, GAIT, AND ENDURANCE: ICD-10-CM

## 2017-09-26 DIAGNOSIS — E11.42 TYPE 2 DIABETES MELLITUS WITH DIABETIC POLYNEUROPATHY, WITH LONG-TERM CURRENT USE OF INSULIN: ICD-10-CM

## 2017-09-26 DIAGNOSIS — Z23 NEEDS FLU SHOT: ICD-10-CM

## 2017-09-26 DIAGNOSIS — D70.1 CHEMOTHERAPY-INDUCED NEUTROPENIA: Primary | ICD-10-CM

## 2017-09-26 DIAGNOSIS — T45.1X5A CHEMOTHERAPY-INDUCED NEUTROPENIA: Primary | ICD-10-CM

## 2017-09-26 DIAGNOSIS — I63.81 LACUNAR STROKE OF LEFT SUBTHALAMIC REGION: Primary | ICD-10-CM

## 2017-09-26 DIAGNOSIS — Z71.9 COUNSELING NOS(V65.40): ICD-10-CM

## 2017-09-26 DIAGNOSIS — Z79.4 TYPE 2 DIABETES MELLITUS WITH DIABETIC POLYNEUROPATHY, WITH LONG-TERM CURRENT USE OF INSULIN: ICD-10-CM

## 2017-09-26 DIAGNOSIS — T38.0X5A STEROID-INDUCED HYPERGLYCEMIA: ICD-10-CM

## 2017-09-26 PROCEDURE — 99213 OFFICE O/P EST LOW 20 MIN: CPT | Mod: PBBFAC,27,PO | Performed by: INTERNAL MEDICINE

## 2017-09-26 PROCEDURE — 99999 PR PBB SHADOW E&M-EST. PATIENT-LVL III: CPT | Mod: PBBFAC,,, | Performed by: INTERNAL MEDICINE

## 2017-09-26 PROCEDURE — 99215 OFFICE O/P EST HI 40 MIN: CPT | Mod: S$PBB,,, | Performed by: NURSE PRACTITIONER

## 2017-09-26 PROCEDURE — 90686 IIV4 VACC NO PRSV 0.5 ML IM: CPT | Mod: PBBFAC,PO

## 2017-09-26 PROCEDURE — 99495 TRANSJ CARE MGMT MOD F2F 14D: CPT | Mod: PBBFAC,PO | Performed by: INTERNAL MEDICINE

## 2017-09-26 PROCEDURE — 3074F SYST BP LT 130 MM HG: CPT | Mod: ,,, | Performed by: NURSE PRACTITIONER

## 2017-09-26 PROCEDURE — 99215 OFFICE O/P EST HI 40 MIN: CPT | Mod: PBBFAC,PN | Performed by: NURSE PRACTITIONER

## 2017-09-26 PROCEDURE — 3008F BODY MASS INDEX DOCD: CPT | Mod: ,,, | Performed by: NURSE PRACTITIONER

## 2017-09-26 PROCEDURE — 99495 TRANSJ CARE MGMT MOD F2F 14D: CPT | Mod: S$PBB,,, | Performed by: INTERNAL MEDICINE

## 2017-09-26 PROCEDURE — 99999 PR PBB SHADOW E&M-EST. PATIENT-LVL V: CPT | Mod: PBBFAC,,, | Performed by: NURSE PRACTITIONER

## 2017-09-26 PROCEDURE — 3079F DIAST BP 80-89 MM HG: CPT | Mod: ,,, | Performed by: NURSE PRACTITIONER

## 2017-09-26 RX ORDER — INSULIN PUMP SYRINGE, 3 ML
EACH MISCELLANEOUS
Qty: 1 EACH | Refills: 0 | Status: ON HOLD | OUTPATIENT
Start: 2017-09-26 | End: 2018-10-20 | Stop reason: HOSPADM

## 2017-09-26 RX ORDER — LANCETS
EACH MISCELLANEOUS
Qty: 400 EACH | Refills: 3 | Status: SHIPPED | OUTPATIENT
Start: 2017-09-26 | End: 2018-05-15 | Stop reason: SDUPTHER

## 2017-09-26 RX ORDER — INSULIN ASPART 100 [IU]/ML
INJECTION, SOLUTION INTRAVENOUS; SUBCUTANEOUS
Qty: 2 BOX | Refills: 1 | Status: SHIPPED | OUTPATIENT
Start: 2017-09-26 | End: 2017-09-28 | Stop reason: ALTCHOICE
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
## 2017-09-26 NOTE — PROGRESS NOTES
Influenza vaccine administered, sara well. Instructed to wait 15mins for observation, no reaction noted @ time of discharge.

## 2017-09-26 NOTE — TELEPHONE ENCOUNTER
----- Message from Valentine Traylor sent at 9/26/2017  9:33 AM CDT -----  Contact: Self  [phone]  Espinoza   -  Patient  Calling  To see  If he can schedule an  for his C Pap machine.   Call back number [phone]  Thanks,

## 2017-09-26 NOTE — PROGRESS NOTES
CC: This 57 y.o. White male  is here for evaluation of  T2DM along with comorbidities indicated in the Visit Diagnosis section of this encounter.    HPI: Bessy Nunez was diagnosed with T2DM in the 1990s. Metformin started at the time of diagnosis. Admits he was in denial for years after diagnosis.     Last seen by me in March 2017 when he was advised to start metformin. He had been off of his meds for 2 months prior to that visit and also c/o financial stress. After his lov, he was diagnosed with CNS vasculitis in 5/2017. He underwent chemo with steroid therapy which contributed to extreme BG elevations in 300 range. He has been confused, unsteady,w/  frequent falls at home, & fatigued. He was admitted in August for a stroke and was followed by Endocrine service at [facility].     He was just dc'd from hospital last week. He did receive prednisone 9/18/17 and solumedrol 9/15-9/17/17. He is on a prednisone taper with 60 mg in AM for 1 week and then will decrease to 40 mg daily until seen by Dr. Love. He will be undergoing chemo w/ steroid once monthly as well.     He arrives with his wife Bekah who feels overwhelmed with handling his insulin therapy.    His wife is his primary caretaker as pt is confused and forgetful. His wife states that she must monitor him constantly paula to ensure he actually swallows his pills.     His wife also reports he is imbalanced and tends to fall a lot.     PMHX: CHASE, lipodystrophy, stroke 1995?    LAST DIABETES EDUCATION: 10/2013 with LEVI Thompson, BERNARDINO/RD for Bydureon training and MNT (Rec. 45-60g CHO per meal and 15g CHO per snack with low fat choices)    RECENT ILLNESSES/HOSPITALIZATIONS - -No.     HOSPITALIZED FOR DIABETES  -  No.    DIABETES MEDICATIONS: Tresiba 25 units bid (noon and 9 pm, Novolog Flexpen 15 units ac, Victoza 1.8 mg once daily ~ 12 pm   Misses medication doses - Yes - he has missed tresiba a few times in the last week. There have also been missed  "novolog doses before meals bc pt will eat before his wife gets a chance to inject insulin.     DM COMPLICATIONS: peripheral neuropathy    SIGNIFICANT DIABETES MED HISTORY: denies    SELF MONITORING BLOOD GLUCOSE: Checks blood glucose at home ac - 2-3x/day. Lowest bg was 170. Mostly bgs 250-400s.     HYPOGLYCEMIC EPISODES: denies     CURRENT DIET: eats 3-4 meals/day -- he eats very large portions. Drinks water and Crystal lite.     CURRENT EXERCISE: none; used to walk for stopped 2 months       /84 (BP Location: Right arm, Patient Position: Sitting, BP Method: Large (Automatic))   Pulse 77   Ht 5' 10" (1.778 m)   Wt 99.4 kg (219 lb 2.2 oz)   BMI 31.44 kg/m²       ROS:   CONSTITUTIONAL: Appetite very high; denies fatigue   EYES: + impaired vision  RESPIRATORY: No shortness of breath or cough  CARDIAC: No chest pain         PHYSICAL EXAM:  GENERAL: Well developed, well nourished. No acute distress.   PSYCH:   conversant, well-groomed. Judgement and insight limited. Calm and cooperative, follows instructions, poor short term memory. Confused   CHEST: Respirations even and unlabored.        Hemoglobin A1C   Date Value Ref Range Status   09/15/2017 10.8 (H) 4.0 - 5.6 % Final     Comment:     According to ADA guidelines, hemoglobin A1c <7.0% represents  optimal control in non-pregnant diabetic patients. Different  metrics may apply to specific patient populations.   Standards of Medical Care in Diabetes-2016.  For the purpose of screening for the presence of diabetes:  <5.7%     Consistent with the absence of diabetes  5.7-6.4%  Consistent with increasing risk for diabetes   (prediabetes)  >or=6.5%  Consistent with diabetes  Currently, no consensus exists for use of hemoglobin A1c  for diagnosis of diabetes for children.  This Hemoglobin A1c assay has significant interference with fetal   hemoglobin   (HbF). The results are invalid for patients with abnormal amounts of   HbF,   including those with known " Hereditary Persistence   of Fetal Hemoglobin. Heterozygous hemoglobin variants (HbAS, HbAC,   HbAD, HbAE, HbA2) do not significantly interfere with this assay;   however, presence of multiple variants in a sample may impact the %   interference.     05/13/2017 11.4 (H) 4.5 - 6.2 % Final     Comment:     According to ADA guidelines, hemoglobin A1C <7.0% represents  optimal control in non-pregnant diabetic patients.  Different  metrics may apply to specific populations.   Standards of Medical Care in Diabetes - 2016.  For the purpose of screening for the presence of diabetes:  <5.7%     Consistent with the absence of diabetes  5.7-6.4%  Consistent with increasing risk for diabetes   (prediabetes)  >or=6.5%  Consistent with diabetes  Currently no consensus exists for use of hemoglobin A1C  for diagnosis of diabetes for children.     04/07/2016 11.8 (H) 4.5 - 6.2 % Final       Chemistry        Component Value Date/Time     09/19/2017 0426    K 3.4 (L) 09/19/2017 0426     09/19/2017 0426    CO2 26 09/19/2017 0426    BUN 21 (H) 09/19/2017 0426    CREATININE 0.9 09/19/2017 0426     (H) 09/19/2017 0426        Component Value Date/Time    CALCIUM 7.9 (L) 09/19/2017 0426    ALKPHOS 88 09/19/2017 0426     (H) 09/19/2017 0426     (H) 09/19/2017 0426    BILITOT 0.6 09/19/2017 0426    ESTGFRAFRICA >60.0 09/19/2017 0426    EGFRNONAA >60.0 09/19/2017 0426          Lab Results   Component Value Date    LDLCALC 69.2 09/15/2017        Ref. Range 9/15/2017 08:19   Cholesterol Latest Ref Range: 120 - 199 mg/dL 190   HDL Latest Ref Range: 40 - 75 mg/dL 51   LDL Cholesterol Latest Ref Range: 63.0 - 159.0 mg/dL 69.2   Total Cholesterol/HDL Ratio Latest Ref Range: 2.0 - 5.0  3.7   Triglycerides Latest Ref Range: 30 - 150 mg/dL 349 (H)       Lab Results   Component Value Date    MICALBCREAT Unable to calculate 04/12/2016        Ref. Range 4/12/2016 07:44   Microalbum.,U,Random Latest Units: ug/mL <2.5    Microalb Creat Ratio Latest Ref Range: 0.0 - 30.0 ug/mg Unable to calculate           ASSESSMENT and PLAN:    A1C goal: <  8 %      1. Uncontrolled type 2 diabetes mellitus with complication, with long-term current use of insulin  Long discussion with his wife re: timing and purpose of his insulins.     1. Increase long acting insulin Levemir to 32 units twice daily - 9 am and 9 pm. Same times every day.   2. Increase Novolog to 20 units before meals with correction scale. Use on premeal readings: 150-200=+2, 201-250=+4; 251-300=+6; 301-350=+8, over 350=+10 units;   May need 10-15 units for low carb meals   3. Test blood sugar before meals and bedtime, 4x/day. And keep a glucose log.   4. Send a log in 1 week. Our fax number is [phone].    5. See Diabetes Educator/Registered Dietician for Medical Nutrition Therapy.   6. Return to clinic in 2 weeks.     He will need frequent insulin dose titration to mirror prednisone taper and he will need a separate set of instructions and doses for his chemo day as well.         Ambulatory Referral to Diabetes Education   2. CNS vasculitis  Followed by Dr. Love. Will be on monthly chemo w/ steroid    3. Steroid-induced hyperglycemia  Increases insulin resistance. Expect prandial doses to be >> basal especially since it increases appetite as well.      Spent 50 minutes with patient with >50% time spent in counseling, as noted in # 1-3.          Orders Placed This Encounter   Procedures    Ambulatory Referral to Diabetes Education     Referral Priority:   Routine     Referral Type:   Consultation     Referral Reason:   Specialty Services Required     Requested Specialty:   Endocrinology     Number of Visits Requested:   1        Return in about 2 weeks (around 10/10/2017).

## 2017-09-26 NOTE — PATIENT INSTRUCTIONS
1. Increase long acting insulin Levemir to 32 units twice daily - 9 am and 9 pm.   2. Increase Novolog to 20 units before meals with correction scale. Use on premeal readings: 150-200=+2, 201-250=+4; 251-300=+6; 301-350=+8, over 350=+10 units  3. Test blood sugar before meals and bedtime, 4x/day. And keep a glucose log.   4. Send a log in 1 week. Our fax number is [phone].    5. See Diabetes Educator/Registered Dietician for Medical Nutrition Therapy.   6. Return to clinic in 2 weeks.

## 2017-09-27 ENCOUNTER — PATIENT MESSAGE (OUTPATIENT)
Dept: ADMINISTRATIVE | Facility: OTHER | Age: 57
End: 2017-09-27

## 2017-09-27 ENCOUNTER — TELEPHONE (OUTPATIENT)
Dept: NEUROLOGY | Facility: CLINIC | Age: 57
End: 2017-09-27

## 2017-09-27 DIAGNOSIS — I77.6 CNS VASCULITIS: Primary | ICD-10-CM

## 2017-09-27 NOTE — TELEPHONE ENCOUNTER
----- Message from Jonnykimshawn Branlorijaqueline sent at 9/27/2017  2:58 PM CDT -----  Contact: [employer]/[phone]  Yam[employer]x states that lancets is covered and she's requesting Accu Chek (Geovanna or Avviva) meter. Thank you.

## 2017-09-27 NOTE — TELEPHONE ENCOUNTER
----- Message from Daniel Selby sent at 9/27/2017  2:28 PM CDT -----  Contact: Bekah ( spouse ) [phone]  Caller is requesting a return call regarding home health and speech therapy orders, pls call

## 2017-09-27 NOTE — TELEPHONE ENCOUNTER
Called Yamilex with [employer] and informed her that patient was given the One Touch Verio Meter and will need the strips and lancets for that meter. [employer] stated that is wasn't preferred but it is covered and will talk to pharmacy to get it filled. She also stated that the Levemir and Tresiba are not preferred medications and require a prior authorization but Lantus vial or Basaglar Kwik pen is covered.

## 2017-09-28 ENCOUNTER — TELEPHONE (OUTPATIENT)
Dept: FAMILY MEDICINE | Facility: CLINIC | Age: 57
End: 2017-09-28

## 2017-09-28 ENCOUNTER — PATIENT MESSAGE (OUTPATIENT)
Dept: NEUROLOGY | Facility: HOSPITAL | Age: 57
End: 2017-09-28

## 2017-09-28 DIAGNOSIS — I77.6 CNS VASCULITIS: ICD-10-CM

## 2017-09-28 RX ORDER — INSULIN GLARGINE 100 [IU]/ML
32 INJECTION, SOLUTION SUBCUTANEOUS 2 TIMES DAILY
Qty: 2 BOX | Refills: 2 | Status: SHIPPED | OUTPATIENT
Start: 2017-09-28 | End: 2018-02-19 | Stop reason: SDUPTHER

## 2017-09-28 RX ORDER — INSULIN LISPRO 100 [IU]/ML
INJECTION, SOLUTION INTRAVENOUS; SUBCUTANEOUS
Qty: 2 BOX | Refills: 2 | Status: SHIPPED | OUTPATIENT
Start: 2017-09-28 | End: 2017-10-10 | Stop reason: ALTCHOICE

## 2017-09-28 NOTE — TELEPHONE ENCOUNTER
----- Message from Maxine López sent at 9/27/2017  2:20 PM CDT -----  Contact: spouse- Mrs. Nunez  Pt spouse states Dr. Nova was supposed to order home health for the pt. She states insurance company states it was never ordered and she needs help.     [phone]

## 2017-09-28 NOTE — TELEPHONE ENCOUNTER
----- Message from Maxine López sent at 9/27/2017  2:20 PM CDT -----  Contact: spouse- Mrs. Nunez  Pt spouse states Dr. Nova was supposed to order home health for the pt. She states insurance company states it was never ordered and she needs help.     [phone]

## 2017-09-28 NOTE — TELEPHONE ENCOUNTER
----- Message from Cirilo Snyder sent at 9/27/2017  4:30 PM CDT -----  Contact: [employer] Quiroga [phone]  Calling to speak with nurse to get prior autho for home healthcare for Pt. [facility] [phone]

## 2017-09-29 NOTE — TELEPHONE ENCOUNTER
Spoke with [employer] and was advised that the HH orders were never received, when I told her that the referral was approved she then asked to fax them to [facility] which was done. She then asked about the patient's insulin and that the novalog needed a PA because the pen was not on his plan and not exactly sure what the patient was getting because he was basically home bound and his wife was the one giving him his insulin and not quite sure of what she is doing.     Just received a call from [facility]  and they are unable to accept the patient due to staffing issues. The orders have been faxed to [facility]

## 2017-09-30 PROBLEM — R74.01 TRANSAMINITIS: Status: ACTIVE | Noted: 2017-09-30

## 2017-10-05 ENCOUNTER — TELEPHONE (OUTPATIENT)
Dept: FAMILY MEDICINE | Facility: CLINIC | Age: 57
End: 2017-10-05

## 2017-10-05 NOTE — TELEPHONE ENCOUNTER
Spoke to patient and informed him I faxed his home health order to [facility]. They also do home health and they accept Medicaid.

## 2017-10-05 NOTE — TELEPHONE ENCOUNTER
Home health orders being forward to a facility that accepts the patients insurance.  Referral dept will follow up with the patient.

## 2017-10-05 NOTE — TELEPHONE ENCOUNTER
----- Message from Zoya Meng sent at 9/29/2017  3:05 PM CDT -----  Contact: Jenny BROWNE  [phone]  Received order from you regarding , They do not take insurance.  Thanks......Mary

## 2017-10-06 ENCOUNTER — TELEPHONE (OUTPATIENT)
Dept: NEUROLOGY | Facility: CLINIC | Age: 57
End: 2017-10-06

## 2017-10-06 ENCOUNTER — LAB VISIT (OUTPATIENT)
Dept: LAB | Facility: HOSPITAL | Age: 57
End: 2017-10-06
Attending: PSYCHIATRY & NEUROLOGY
Payer: MEDICAID

## 2017-10-06 DIAGNOSIS — I77.6 CNS VASCULITIS: ICD-10-CM

## 2017-10-06 DIAGNOSIS — I77.6 CNS VASCULITIS: Primary | ICD-10-CM

## 2017-10-06 LAB
BASOPHILS # BLD AUTO: ABNORMAL K/UL
BASOPHILS NFR BLD: 0 %
DIFFERENTIAL METHOD: ABNORMAL
EOSINOPHIL # BLD AUTO: ABNORMAL K/UL
EOSINOPHIL NFR BLD: 0 %
ERYTHROCYTE [DISTWIDTH] IN BLOOD BY AUTOMATED COUNT: 15.8 %
HCT VFR BLD AUTO: 36.9 %
HGB BLD-MCNC: 12.6 G/DL
LYMPHOCYTES # BLD AUTO: ABNORMAL K/UL
LYMPHOCYTES NFR BLD: 10 %
MCH RBC QN AUTO: 28.6 PG
MCHC RBC AUTO-ENTMCNC: 34.1 G/DL
MCV RBC AUTO: 84 FL
MONOCYTES # BLD AUTO: ABNORMAL K/UL
MONOCYTES NFR BLD: 10 %
NEUTROPHILS NFR BLD: 72 %
NEUTS BAND NFR BLD MANUAL: 8 %
PLATELET # BLD AUTO: 173 K/UL
PMV BLD AUTO: 9 FL
RBC # BLD AUTO: 4.41 M/UL
WBC # BLD AUTO: 7.54 K/UL

## 2017-10-06 PROCEDURE — 85007 BL SMEAR W/DIFF WBC COUNT: CPT

## 2017-10-06 PROCEDURE — 85027 COMPLETE CBC AUTOMATED: CPT

## 2017-10-06 PROCEDURE — 36415 COLL VENOUS BLD VENIPUNCTURE: CPT

## 2017-10-06 NOTE — TELEPHONE ENCOUNTER
I spoke with Tabatha, who said she missed a call to schedule labs. She said Bessy is sleeping much more lately. Per Dr. Love scheduled CBC to be done at [facility] at 4:55, but advised her if Bessy's is worsening to go to the ER. Tabatha verbalized understanding.

## 2017-10-06 NOTE — TELEPHONE ENCOUNTER
----- Message from Ruby Staton sent at 10/6/2017 11:46 AM CDT -----  Contact: Wife Tabatha  Pt wife Tabatha needs to speak with nurse about blood work    Pt can be reached at [phone]    Thanks

## 2017-10-09 ENCOUNTER — TELEPHONE (OUTPATIENT)
Dept: FAMILY MEDICINE | Facility: CLINIC | Age: 57
End: 2017-10-09

## 2017-10-09 NOTE — TELEPHONE ENCOUNTER
----- Message from Edgar Nova MD sent at 10/9/2017 10:20 AM CDT -----  Contact: self      ----- Message -----  From: Javier Kemal  Sent: 10/9/2017   9:19 AM  To: Rohini Hernández Staff    Pt called stating Hospice arrived  instead of Home Health nurse. Please advise pt of reason for hospice. Contact pt at [phone].    Thanks

## 2017-10-09 NOTE — TELEPHONE ENCOUNTER
Called wife, Bekah, and spoke with her about [facility]. The nurse who came out states they do not do Home Health. I will call that office and get clarification, because the nurse who came and spoke to us about this business informed us they do home health as well as hospice. Will call wife back later.

## 2017-10-10 ENCOUNTER — TELEPHONE (OUTPATIENT)
Dept: ENDOCRINOLOGY | Facility: CLINIC | Age: 57
End: 2017-10-10

## 2017-10-10 ENCOUNTER — TELEPHONE (OUTPATIENT)
Dept: FAMILY MEDICINE | Facility: CLINIC | Age: 57
End: 2017-10-10

## 2017-10-10 RX ORDER — INSULIN LISPRO 100 [IU]/ML
INJECTION, SOLUTION INTRAVENOUS; SUBCUTANEOUS
Qty: 2 VIAL | Refills: 3 | Status: SHIPPED | OUTPATIENT
Start: 2017-10-10 | End: 2018-02-22 | Stop reason: SDUPTHER

## 2017-10-10 RX ORDER — SYRINGE,SAFETY WITH NEEDLE,1ML 25GX1"
SYRINGE (EA) MISCELLANEOUS
Qty: 300 EACH | Refills: 3 | Status: SHIPPED | OUTPATIENT
Start: 2017-10-10 | End: 2020-01-15 | Stop reason: SDUPTHER

## 2017-10-10 NOTE — TELEPHONE ENCOUNTER
----- Message from Javier Sommer sent at 10/9/2017  9:19 AM CDT -----  Contact: self  Pt called stating Hospice arrived  instead of Home Health nurse. Please advise pt of reason for hospice. Contact pt at [phone].    Thanks

## 2017-10-10 NOTE — TELEPHONE ENCOUNTER
----- Message from Rochelle Jose MA sent at 10/6/2017  3:32 PM CDT -----  Contact: Charlotte      ----- Message -----  From: Melina Judge  Sent: 10/6/2017  11:07 AM  To: Ivette Virgen Staff    Called to request Humalog Vial and syringes for pt. Tania can be reached @ [phone].

## 2017-10-11 NOTE — TELEPHONE ENCOUNTER
Called [facility] and they in fact do not do home health, faxed over referral to [facility]. They accept Medicaid.

## 2017-10-16 ENCOUNTER — TELEPHONE (OUTPATIENT)
Dept: NEUROLOGY | Facility: CLINIC | Age: 57
End: 2017-10-16

## 2017-10-16 ENCOUNTER — TELEPHONE (OUTPATIENT)
Dept: ENDOCRINOLOGY | Facility: CLINIC | Age: 57
End: 2017-10-16

## 2017-10-16 NOTE — TELEPHONE ENCOUNTER
----- Message from Jose Wong sent at 10/16/2017 10:24 AM CDT -----  Contact: Bekah @ [phone]  Bekah states pt has completed taking sulfamethoxazole-trimethoprim 800-160mg (BACTRIM DS) 800-160 mg Tab and wants to confirm if pt needs to refill and continue taking medication. Pls call.

## 2017-10-17 ENCOUNTER — OFFICE VISIT (OUTPATIENT)
Dept: ENDOCRINOLOGY | Facility: CLINIC | Age: 57
End: 2017-10-17
Payer: MEDICAID

## 2017-10-17 VITALS
BODY MASS INDEX: 30.03 KG/M2 | WEIGHT: 214.5 LBS | HEART RATE: 84 BPM | HEIGHT: 71 IN | DIASTOLIC BLOOD PRESSURE: 84 MMHG | SYSTOLIC BLOOD PRESSURE: 129 MMHG

## 2017-10-17 DIAGNOSIS — R73.9 STEROID-INDUCED HYPERGLYCEMIA: ICD-10-CM

## 2017-10-17 DIAGNOSIS — T38.0X5A STEROID-INDUCED HYPERGLYCEMIA: ICD-10-CM

## 2017-10-17 DIAGNOSIS — I77.6 CNS VASCULITIS: ICD-10-CM

## 2017-10-17 DIAGNOSIS — Z71.9 VISIT FOR COUNSELING: ICD-10-CM

## 2017-10-17 PROCEDURE — 99999 PR PBB SHADOW E&M-EST. PATIENT-LVL V: CPT | Mod: PBBFAC,,, | Performed by: NURSE PRACTITIONER

## 2017-10-17 PROCEDURE — 99214 OFFICE O/P EST MOD 30 MIN: CPT | Mod: S$PBB,,, | Performed by: NURSE PRACTITIONER

## 2017-10-17 PROCEDURE — 99215 OFFICE O/P EST HI 40 MIN: CPT | Mod: PBBFAC,PN | Performed by: NURSE PRACTITIONER

## 2017-10-17 NOTE — PATIENT INSTRUCTIONS
Decrease Basaglar to 25 units twice daily.   Decrease Novolog to 10 units before breakfast. Continue 20 units before lunch and dinner with correction scale.     On day of chemo and 1-2 days after chemo, increase Basaglar to 32 units twice daily.   And increase Novolog to 10(12) units before breakfast, 24 units before lunch and dinner with correction scale.     Test blood sugar 4x/day. Return to clinic in 2 weeks with written logs. Call with any issues.

## 2017-10-17 NOTE — PROGRESS NOTES
CC: This 57 y.o. White male  is here for evaluation of  T2DM along with comorbidities indicated in the Visit Diagnosis section of this encounter.    HPI: Bessy Nunez was diagnosed with T2DM in the 1990s. Metformin started at the time of diagnosis. Admits he was in denial for years after diagnosis.     Prior visit on 9/26/17  Last seen by me in March 2017 when he was advised to start metformin. He had been off of his meds for 2 months prior to that visit and also c/o financial stress. After his lov, he was diagnosed with CNS vasculitis in 5/2017. He underwent chemo with steroid therapy which contributed to extreme BG elevations in 300 range. He has been confused, unsteady,w/  frequent falls at home, & fatigued. He was admitted in August for a stroke and was followed by Endocrine service at [facility].   He was just dc'd from hospital last week. He did receive prednisone 9/18/17 and solumedrol 9/15-9/17/17. He is on a prednisone taper with 60 mg in AM for 1 week and then will decrease to 40 mg daily until seen by Dr. Love. He will be undergoing chemo w/ steroid once monthly as well.   He arrives with his wife Bekah who feels overwhelmed with handling his insulin therapy.    His wife is his primary caretaker as pt is confused and forgetful. His wife states that she must monitor him constantly paula to ensure he actually swallows his pills.   His wife also reports he is imbalanced and tends to fall a lot.   Plan Long discussion with his wife re: timing and purpose of his insulins.   1. Increase long acting insulin Levemir to 32 units twice daily - 9 am and 9 pm. Same times every day.   2. Increase Novolog to 20 units before meals with correction scale. Use on premeal readings: 150-200=+2, 201-250=+4; 251-300=+6; 301-350=+8, over 350=+10 units;   May need 10-15 units for low carb meals   3. Test blood sugar before meals and bedtime, 4x/day. And keep a glucose log.   4. Send a log in 1 week. Our fax number is  "[phone].    5. See Diabetes Educator/Registered Dietician for Medical Nutrition Therapy.   6. Return to clinic in 2 weeks. He will need frequent insulin dose titration to mirror prednisone taper and he will need a separate set of instructions and doses for his chemo day as well.     Interval history He arrives with his wife Bekah  He has switched from tresiba to basaglar d/t insurance preference.   If his bg in the AM is low like , she skips basaglar, novolog, and victoza doses. Still gives him metformin though.     He has tapered down to prednisone 10 mg once daily am. Tomorrow he will be getting his monthly chemo + steroid ~ 11 am.     Patient does not like to take his oral meds so his wife must closely monitor him.     PMHX: CHASE, lipodystrophy, stroke 1995?    LAST DIABETES EDUCATION: 10/2013 with LEVI Thompson, MINGE/RD for Bydureon training and MNT (Rec. 45-60g CHO per meal and 15g CHO per snack with low fat choices)    RECENT ILLNESSES/HOSPITALIZATIONS - -No.     HOSPITALIZED FOR DIABETES  -  No.    DIABETES MEDICATIONS: Basaglar 32 units bid (noon and 9 pm), Novolog Flexpen 20 units ac, Victoza 1.8 mg once daily at night, metformin xr 1000 mg bid   Misses medication doses - Yes -  He often eats before she gets a chance to administer novolog ac.     DM COMPLICATIONS: peripheral neuropathy    SIGNIFICANT DIABETES MED HISTORY: denies    SELF MONITORING BLOOD GLUCOSE: Checks blood glucose at home ac -4x/day.             HYPOGLYCEMIC EPISODES: none     CURRENT DIET: eats 3-4 meals/day -- he eats very large portions. Drinks water and Crystal lite.     CURRENT EXERCISE: none; used to walk for stopped 2 months       /84 (BP Location: Left arm, Patient Position: Sitting, BP Method: Large (Automatic))   Pulse 84   Ht 5' 11" (1.803 m)   Wt 97.3 kg (214 lb 8.1 oz)   BMI 29.92 kg/m²       ROS:   CONSTITUTIONAL: Appetite very high paula after meals; denies fatigue   RESPIRATORY: No shortness of breath "   CARDIAC: No chest pain         PHYSICAL EXAM:  GENERAL: Well developed, well nourished. No acute distress.   PSYCH:   conversant, well-groomed. Judgement and insight limited. Calm and cooperative, follows instructions, poor short term memory. Confused   CHEST: Respirations even and unlabored.        Hemoglobin A1C   Date Value Ref Range Status   09/15/2017 10.8 (H) 4.0 - 5.6 % Final     Comment:     According to ADA guidelines, hemoglobin A1c <7.0% represents  optimal control in non-pregnant diabetic patients. Different  metrics may apply to specific patient populations.   Standards of Medical Care in Diabetes-2016.  For the purpose of screening for the presence of diabetes:  <5.7%     Consistent with the absence of diabetes  5.7-6.4%  Consistent with increasing risk for diabetes   (prediabetes)  >or=6.5%  Consistent with diabetes  Currently, no consensus exists for use of hemoglobin A1c  for diagnosis of diabetes for children.  This Hemoglobin A1c assay has significant interference with fetal   hemoglobin   (HbF). The results are invalid for patients with abnormal amounts of   HbF,   including those with known Hereditary Persistence   of Fetal Hemoglobin. Heterozygous hemoglobin variants (HbAS, HbAC,   HbAD, HbAE, HbA2) do not significantly interfere with this assay;   however, presence of multiple variants in a sample may impact the %   interference.     05/13/2017 11.4 (H) 4.5 - 6.2 % Final     Comment:     According to ADA guidelines, hemoglobin A1C <7.0% represents  optimal control in non-pregnant diabetic patients.  Different  metrics may apply to specific populations.   Standards of Medical Care in Diabetes - 2016.  For the purpose of screening for the presence of diabetes:  <5.7%     Consistent with the absence of diabetes  5.7-6.4%  Consistent with increasing risk for diabetes   (prediabetes)  >or=6.5%  Consistent with diabetes  Currently no consensus exists for use of hemoglobin A1C  for diagnosis of  diabetes for children.     04/07/2016 11.8 (H) 4.5 - 6.2 % Final       Chemistry        Component Value Date/Time     09/19/2017 0426    K 3.4 (L) 09/19/2017 0426     09/19/2017 0426    CO2 26 09/19/2017 0426    BUN 21 (H) 09/19/2017 0426    CREATININE 0.9 09/19/2017 0426     (H) 09/19/2017 0426        Component Value Date/Time    CALCIUM 7.9 (L) 09/19/2017 0426    ALKPHOS 88 09/19/2017 0426     (H) 09/19/2017 0426     (H) 09/19/2017 0426    BILITOT 0.6 09/19/2017 0426    ESTGFRAFRICA >60.0 09/19/2017 0426    EGFRNONAA >60.0 09/19/2017 0426          Lab Results   Component Value Date    LDLCALC 69.2 09/15/2017        Ref. Range 9/15/2017 08:19   Cholesterol Latest Ref Range: 120 - 199 mg/dL 190   HDL Latest Ref Range: 40 - 75 mg/dL 51   LDL Cholesterol Latest Ref Range: 63.0 - 159.0 mg/dL 69.2   Total Cholesterol/HDL Ratio Latest Ref Range: 2.0 - 5.0  3.7   Triglycerides Latest Ref Range: 30 - 150 mg/dL 349 (H)       Lab Results   Component Value Date    MICALBCREAT Unable to calculate 04/12/2016        Ref. Range 4/12/2016 07:44   Microalbum.,U,Random Latest Units: ug/mL <2.5   Microalb Creat Ratio Latest Ref Range: 0.0 - 30.0 ug/mg Unable to calculate           ASSESSMENT and PLAN:    A1C goal: <  8.5 %    1. Uncontrolled type 2 diabetes mellitus with complication, with long-term current use of insulin  Decrease Basaglar to 25 units twice daily.   Decrease Novolog to 10 units before breakfast. Continue 20 units before lunch and dinner with correction scale.     On day of chemo and 1-2 days after chemo, increase Basaglar to 32 units twice daily.   And increase Novolog to 10(12) units before breakfast, 24 units before lunch and dinner with correction scale.     Test blood sugar 4x/day. Return to clinic in 2 weeks with written logs. Call with any issues.      2. Steroid-induced hyperglycemia  Followed by Dr. Love. Will be on monthly chemo w/ steroid    3. CNS vasculitis  Increases  insulin resistance. Expect prandial doses to be >> basal especially since it increases appetite as well.       Spent 25 minutes with patient with > 50% time spent in counseling, as noted in # 1-3.       No orders of the defined types were placed in this encounter.       Return in about 2 weeks (around 10/31/2017).

## 2017-10-18 ENCOUNTER — INFUSION (OUTPATIENT)
Dept: INFUSION THERAPY | Facility: HOSPITAL | Age: 57
End: 2017-10-18
Attending: PSYCHIATRY & NEUROLOGY
Payer: MEDICAID

## 2017-10-18 ENCOUNTER — TELEPHONE (OUTPATIENT)
Dept: NEUROLOGY | Facility: CLINIC | Age: 57
End: 2017-10-18

## 2017-10-18 ENCOUNTER — OFFICE VISIT (OUTPATIENT)
Dept: HEMATOLOGY/ONCOLOGY | Facility: CLINIC | Age: 57
End: 2017-10-18
Payer: MEDICAID

## 2017-10-18 VITALS
WEIGHT: 219.56 LBS | HEIGHT: 71 IN | TEMPERATURE: 98 F | BODY MASS INDEX: 30.74 KG/M2 | SYSTOLIC BLOOD PRESSURE: 146 MMHG | DIASTOLIC BLOOD PRESSURE: 93 MMHG | HEART RATE: 82 BPM

## 2017-10-18 VITALS
HEART RATE: 81 BPM | TEMPERATURE: 98 F | RESPIRATION RATE: 18 BRPM | SYSTOLIC BLOOD PRESSURE: 136 MMHG | DIASTOLIC BLOOD PRESSURE: 89 MMHG

## 2017-10-18 DIAGNOSIS — Z51.11 ENCOUNTER FOR CHEMOTHERAPY MANAGEMENT: ICD-10-CM

## 2017-10-18 DIAGNOSIS — I77.6 CNS VASCULITIS: Primary | ICD-10-CM

## 2017-10-18 DIAGNOSIS — G35 MULTIPLE SCLEROSIS: Primary | ICD-10-CM

## 2017-10-18 PROCEDURE — 63600175 PHARM REV CODE 636 W HCPCS: Performed by: INTERNAL MEDICINE

## 2017-10-18 PROCEDURE — 96367 TX/PROPH/DG ADDL SEQ IV INF: CPT

## 2017-10-18 PROCEDURE — 99214 OFFICE O/P EST MOD 30 MIN: CPT | Mod: S$PBB,,, | Performed by: INTERNAL MEDICINE

## 2017-10-18 PROCEDURE — 25000003 PHARM REV CODE 250: Performed by: INTERNAL MEDICINE

## 2017-10-18 PROCEDURE — 96411 CHEMO IV PUSH ADDL DRUG: CPT

## 2017-10-18 PROCEDURE — 99999 PR PBB SHADOW E&M-EST. PATIENT-LVL IV: CPT | Mod: PBBFAC,,, | Performed by: INTERNAL MEDICINE

## 2017-10-18 PROCEDURE — 96413 CHEMO IV INFUSION 1 HR: CPT

## 2017-10-18 PROCEDURE — 99214 OFFICE O/P EST MOD 30 MIN: CPT | Mod: PBBFAC,25 | Performed by: INTERNAL MEDICINE

## 2017-10-18 RX ORDER — HEPARIN 100 UNIT/ML
500 SYRINGE INTRAVENOUS
Status: CANCELLED | OUTPATIENT
Start: 2017-10-18

## 2017-10-18 RX ORDER — SODIUM CHLORIDE 0.9 % (FLUSH) 0.9 %
10 SYRINGE (ML) INJECTION
Status: CANCELLED | OUTPATIENT
Start: 2017-10-18

## 2017-10-18 RX ORDER — SODIUM CHLORIDE 0.9 % (FLUSH) 0.9 %
10 SYRINGE (ML) INJECTION
Status: DISCONTINUED | OUTPATIENT
Start: 2017-10-18 | End: 2017-10-18 | Stop reason: HOSPADM

## 2017-10-18 RX ORDER — HEPARIN 100 UNIT/ML
500 SYRINGE INTRAVENOUS
Status: DISCONTINUED | OUTPATIENT
Start: 2017-10-18 | End: 2017-10-18 | Stop reason: HOSPADM

## 2017-10-18 RX ADMIN — CYCLOPHOSPHAMIDE 1320 MG: 1 INJECTION, POWDER, FOR SOLUTION INTRAVENOUS; ORAL at 01:10

## 2017-10-18 RX ADMIN — MESNA 1320 MG: 100 INJECTION, SOLUTION INTRAVENOUS at 12:10

## 2017-10-18 RX ADMIN — DEXAMETHASONE SODIUM PHOSPHATE: 4 INJECTION, SOLUTION INTRAMUSCULAR; INTRAVENOUS at 12:10

## 2017-10-18 NOTE — PLAN OF CARE
Problem: Chemotherapy Effects (Adult)  Goal: Signs and Symptoms of Listed Potential Problems Will be Absent, Minimized or Managed (Chemotherapy Effects)  Signs and symptoms of listed potential problems will be absent, minimized or managed by discharge/transition of care (reference Chemotherapy Effects (Adult) CPG).  Outcome: Ongoing (interventions implemented as appropriate)  Pt. Here today for cytoxan. Reviewed treatment plan with wife, vss. Peripheral IV started, blood return present, infusing without difficulty. No questions at this time.

## 2017-10-18 NOTE — PROGRESS NOTES
SECTION OF HEMATOLOGY AND BONE MARROW TRANSPLANT  New Patient Visit   10/18/2017  Referred by:  No ref. provider found  Referred for:     CHIEF COMPLAINT: No chief complaint on file.      HISTORY OF PRESENT ILLNESS:   Presents for cycle #2 HD CTX for MS.   Per wife confusion stable from last appt.  Labs reviewed. Patient examined.  No contraindication to proceed with CTX.  Denies fever, chills, nightsweats, bleeding, brusing, lymphadenopathy, signs/symptoms of splenomegaly.      PAST MEDICAL HISTORY:   Past Medical History:   Diagnosis Date    CNS vasculitis 6/2/2017    Follows with Dr. Love By mri.  Several active lesions, many old. 5/13: MRA brain/neck, MRI brain w/wo contrast show no vascular occlusion, multiple foci of hyperintensity in deep cerebral periventricular white matter in pattern of demyelinating process.  5/17: MRI spine performed, no spinal cord lesions. Hospitalization 6/2017. EEG was performed negative for seizures.  Repeat MRI showing new lesion, question whether this was demyelination versus CVA. 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 Findings compatible with a small acute lacunar infarct adjacent to the left frontal horn.  Nonspecific enhancement just inferior to this region and extending into the left basal ganglia, as well as within the inferior aspect of the left cerebellum.  No evidence of a focal mass. 2.  Extensive increased signal intensity involving the periventricular white matter compatible with demyelinating disease versus vasculitis. 3.  Clinical correlation and followup recommended. 4.  This reportedly flattened in the EPIC and the corpus callosum medical record system.    Mixed hyperlipidemia 11/9/2012    NAFLD (nonalcoholic fatty liver disease) 10/11/2013     "CHASE (nonalcoholic steatohepatitis)     Obesity     Stroke     Type 2 diabetes mellitus with diabetic polyneuropathy, with long-term current use of insulin 5/14/2017    Type 2 diabetes mellitus with hyperglycemia, with long-term current use of insulin 10/11/2013       PAST SURGICAL HISTORY:   Past Surgical History:   Procedure Laterality Date    SKIN CANCER EXCISION         PAST SOCIAL HISTORY:   reports that he has never smoked. He has never used smokeless tobacco. He reports that he does not drink alcohol or use drugs.    FAMILY HISTORY:  Family History   Problem Relation Age of Onset    Heart disease Mother     Hypertension Mother     Heart failure Mother     Cancer Father      lung    Diabetes Maternal Aunt        CURRENT MEDICATIONS:   Current Outpatient Prescriptions   Medication Sig    aspirin (ECOTRIN) 81 MG EC tablet Take 81 mg by mouth once daily.    atenolol (TENORMIN) 50 MG tablet Take 1 tablet (50 mg total) by mouth once daily.    atorvastatin (LIPITOR) 40 MG tablet Take 1 tablet (40 mg total) by mouth once daily.    blood sugar diagnostic Strp To check BG 4 times daily, to use with insurance preferred meter    blood-glucose meter kit To check BG 4 times daily, one touch verio meter. Dx code e11.65    diltiaZEM (DILACOR XR) 240 MG CDCR Take 1 capsule (240 mg total) by mouth once daily.    insulin glargine (BASAGLAR KWIKPEN) 100 unit/mL (3 mL) InPn pen Inject 32 Units into the skin 2 (two) times daily. Once in the morning at 9 am and once in the evening at 9 pm.    insulin lispro (HUMALOG) 100 unit/mL injection Inject 20 units before meals    insulin needles, disposable, (BD ULTRA-FINE ANA PEN NEEDLES) 32 x 5/32 " Ndle Inject twice daily    insulin syringe-needle U-100 (INSULIN SYRINGE) 1/2 mL 30 gauge x 5/16 Syrg Use 3x/day    lancets Misc To check BG 4 times daily, one touch verio meter. Dx code e11.65    levetiracetam (KEPPRA) 500 MG Tab Take 1 tablet (500 mg total) by mouth 2 " (two) times daily.    liraglutide 0.6 mg/0.1 mL, 18 mg/3 mL, subq PNIJ (VICTOZA 3-TOMASZ) 0.6 mg/0.1 mL (18 mg/3 mL) PnIj Inject 1.8 mg into the skin once daily.    metformin (GLUCOPHAGE-XR) 500 MG 24 hr tablet Take 2 tablets (1,000 mg total) by mouth 2 (two) times daily with meals.    omega-3 fatty acids-vitamin E (FISH OIL) 1,000 mg Cap Take 1 capsule by mouth 2 (two) times daily.    ONETOUCH DELICA LANCETS 33 gauge Misc     predniSONE (DELTASONE) 20 MG tablet Take 3 tablets daily (60 mg) for 2 wks. Then 2 tablets daily (40 mg). See Dr. Olguin, Dr. Love to adjust dose as needed long term.    ranitidine (ZANTAC) 150 MG tablet Take 1 tablet (150 mg total) by mouth 2 (two) times daily.    sertraline (ZOLOFT) 100 MG tablet 1 and half tablet daily (Patient taking differently: Take 150 mg by mouth once daily. )    sulfamethoxazole-trimethoprim 800-160mg (BACTRIM DS) 800-160 mg Tab Take 1 tablet by mouth every Mon, Wed, Fri.    valsartan (DIOVAN) 320 MG tablet Take 1 tablet (320 mg total) by mouth once daily.    vitamin D 1000 units Tab Take 5 tablets (5,000 Units total) by mouth once daily.    calcium carbonate (OS-DONNA) 500 mg calcium (1,250 mg) tablet Take 2 tablets (1,000 mg total) by mouth once.     No current facility-administered medications for this visit.      ALLERGIES:   Review of patient's allergies indicates:  No Known Allergies          REVIEW OF SYSTEMS:   General ROS: negative  Psychological ROS: negative  Ophthalmic ROS: negative  ENT ROS: negative  Allergy and Immunology ROS: negative  Hematological and Lymphatic ROS: negative  Endocrine ROS: negative  Respiratory ROS: negative  Cardiovascular ROS: negative  Gastrointestinal ROS: negative  Genito-Urinary ROS: negative  Musculoskeletal ROS: negative  Neurological ROS: see HPI  Dermatological ROS: negative    PHYSICAL EXAM:   Vitals:    10/18/17 1030   BP: (!) 146/93   Pulse: 82   Temp: 98.3 °F (36.8 °C)       General - well developed, well  nourished, no apparent distress  Head & Face - no sinus tenderness  Eyes - normal conjunctivae and lids   ENT - normal external auditory canals and tympanic membranes bilaterally oropharynx clear,  Normal dentition and gums  Neck - normal thyroid  Chest and Lung - normal respiratory effort, clear to auscultation bilaterally   Cardiovascular - RRR with no MGR, normal S1 and S2; no pedal edema  Abdomen -  soft, nontender, no palpable hepatomegaly or splenomegaly  Lymph - no palpable lymphadenopathy  Extremities - unremarkable nails and digits  Heme - no bruising, petechiae, pallor  Skin - no rashes or lesions  Psych - appropriate mood and affect      ECOG Performance Status: (foot note - ECOG PS provided by Eastern Cooperative Oncology Group) 1 - Symptomatic but completely ambulatory    Karnofsky Performance Score:  90%- Able to Carry on Normal Activity: Minor Symptoms of Disease  DATA:   Lab Results   Component Value Date    WBC 8.21 10/18/2017    HGB 11.2 (L) 10/18/2017    HCT 32.9 (L) 10/18/2017    MCV 84 10/18/2017     (L) 10/18/2017     Gran #   Date Value Ref Range Status   10/18/2017 5.3 1.8 - 7.7 K/uL Final     Gran%   Date Value Ref Range Status   10/18/2017 64.6 38.0 - 73.0 % Final     CMP  Sodium   Date Value Ref Range Status   10/18/2017 140 136 - 145 mmol/L Final     Potassium   Date Value Ref Range Status   10/18/2017 4.5 3.5 - 5.1 mmol/L Final     Chloride   Date Value Ref Range Status   10/18/2017 107 95 - 110 mmol/L Final     CO2   Date Value Ref Range Status   10/18/2017 26 23 - 29 mmol/L Final     Glucose   Date Value Ref Range Status   10/18/2017 168 (H) 70 - 110 mg/dL Final     BUN, Bld   Date Value Ref Range Status   10/18/2017 21 (H) 6 - 20 mg/dL Final     Creatinine   Date Value Ref Range Status   10/18/2017 0.9 0.5 - 1.4 mg/dL Final     Calcium   Date Value Ref Range Status   10/18/2017 9.1 8.7 - 10.5 mg/dL Final     Total Protein   Date Value Ref Range Status   10/18/2017 6.5 6.0 - 8.4  g/dL Final     Albumin   Date Value Ref Range Status   10/18/2017 3.2 (L) 3.5 - 5.2 g/dL Final   10/11/2013 4.3 3.6 - 5.1 g/dL Final     Comment:     @ Test Performed By:  [employer] AMELIA Almazan M.D.,   [address] [place] [address]  IA #[id number]     Total Bilirubin   Date Value Ref Range Status   10/18/2017 0.8 0.1 - 1.0 mg/dL Final     Comment:     For infants and newborns, interpretation of results should be based  on gestational age, weight and in agreement with clinical  observations.  Premature Infant recommended reference ranges:  Up to 24 hours.............<8.0 mg/dL  Up to 48 hours............<12.0 mg/dL  3-5 days..................<15.0 mg/dL  6-29 days.................<15.0 mg/dL       Alkaline Phosphatase   Date Value Ref Range Status   10/18/2017 87 55 - 135 U/L Final     AST   Date Value Ref Range Status   10/18/2017 23 10 - 40 U/L Final     ALT   Date Value Ref Range Status   10/18/2017 53 (H) 10 - 44 U/L Final     Anion Gap   Date Value Ref Range Status   10/18/2017 7 (L) 8 - 16 mmol/L Final     eGFR if    Date Value Ref Range Status   10/18/2017 >60.0 >60 mL/min/1.73 m^2 Final     eGFR if non    Date Value Ref Range Status   10/18/2017 >60.0 >60 mL/min/1.73 m^2 Final     Comment:     Calculation used to obtain the estimated glomerular filtration  rate (eGFR) is the CKD-EPI equation. Since race is unknown   in our information system, the eGFR values for   -American and Non--American patients are given   for each creatinine result.           ASSESSMENT AND PLAN:   Encounter Diagnoses   Name Primary?    Multiple sclerosis Yes    Encounter for chemotherapy management      -proceed with HDCTX dose #2 with mesna support per MS protocol per Dr. Love  -patient with multiple MS related questions; I recommended they discuss these with Dr. Love at their upcoming 10/20 appt  -will inquire  with Dr Love plans for future CTX doses  -recommend lab monitoring on 10/30 endo appt     Follow Up:  Discuss fu plans with  Dr. Ivan Goldstein MD  Hematology/Oncology/Bone Marrow Transplant    -proceed with ctx  -l[place] [facility] [place]  -discuss with ema today and timing of next dosing

## 2017-10-18 NOTE — Clinical Note
-cbc, cmp type and screen at [facility] on 10/30 -same labs, md appt, chair for cytoxan infusion in 1 month

## 2017-10-18 NOTE — TELEPHONE ENCOUNTER
----- Message from Na Granger sent at 10/18/2017 12:50 PM CDT -----  Contact: Bekah wife [phone]  Bekah is calling to ask Dr. Armstrong to ask if patient should refill his sulfamethoxazole-trimethoprim 800-160mg (BACTRIM DS) 800-160 mg Tab while he is doing chemo. Please call

## 2017-10-19 ENCOUNTER — NURSE TRIAGE (OUTPATIENT)
Dept: ADMINISTRATIVE | Facility: CLINIC | Age: 57
End: 2017-10-19

## 2017-10-19 ENCOUNTER — TELEPHONE (OUTPATIENT)
Dept: ENDOCRINOLOGY | Facility: CLINIC | Age: 57
End: 2017-10-19

## 2017-10-19 NOTE — TELEPHONE ENCOUNTER
----- Message from Yasmeen Hoffman sent at 10/19/2017  3:25 PM CDT -----  Contact: wife  Wife called regarding insulin medication sent to pharmacy. Not sure how to give to patient. Please contact Bekah Nunez at [phone].    Thanks!

## 2017-10-19 NOTE — TELEPHONE ENCOUNTER
Patient's wife Bekah called to clarify medication dosage is the same with Humalog as it is with Novolog. Informed Bekah that per providers instruction patient is to take 10 units before breakfast and 20 units before lunch and dinner along with correction scale provided to her at last visit. Bekah verbalized understanding.

## 2017-10-20 ENCOUNTER — OFFICE VISIT (OUTPATIENT)
Dept: NEUROLOGY | Facility: CLINIC | Age: 57
End: 2017-10-20
Payer: MEDICAID

## 2017-10-20 VITALS
DIASTOLIC BLOOD PRESSURE: 71 MMHG | HEART RATE: 97 BPM | HEIGHT: 71 IN | SYSTOLIC BLOOD PRESSURE: 107 MMHG | BODY MASS INDEX: 30.66 KG/M2 | WEIGHT: 219 LBS

## 2017-10-20 DIAGNOSIS — I77.6 CNS VASCULITIS: Primary | ICD-10-CM

## 2017-10-20 DIAGNOSIS — Z79.899 ENCOUNTER FOR LONG-TERM (CURRENT) USE OF HIGH-RISK MEDICATION: ICD-10-CM

## 2017-10-20 DIAGNOSIS — R41.89 COGNITIVE IMPAIRMENT: ICD-10-CM

## 2017-10-20 DIAGNOSIS — Z71.89 COUNSELING REGARDING GOALS OF CARE: ICD-10-CM

## 2017-10-20 DIAGNOSIS — Z29.89 PROPHYLACTIC IMMUNOTHERAPY: ICD-10-CM

## 2017-10-20 DIAGNOSIS — R26.9 GAIT DISTURBANCE: ICD-10-CM

## 2017-10-20 PROCEDURE — 99215 OFFICE O/P EST HI 40 MIN: CPT | Mod: S$PBB,,, | Performed by: PSYCHIATRY & NEUROLOGY

## 2017-10-20 PROCEDURE — 99999 PR PBB SHADOW E&M-EST. PATIENT-LVL III: CPT | Mod: PBBFAC,,, | Performed by: PSYCHIATRY & NEUROLOGY

## 2017-10-20 PROCEDURE — 99213 OFFICE O/P EST LOW 20 MIN: CPT | Mod: PBBFAC | Performed by: PSYCHIATRY & NEUROLOGY

## 2017-10-20 RX ORDER — SULFAMETHOXAZOLE AND TRIMETHOPRIM 800; 160 MG/1; MG/1
TABLET ORAL
Qty: 12 TABLET | Refills: 3 | Status: SHIPPED | OUTPATIENT
Start: 2017-10-20 | End: 2018-06-12 | Stop reason: SDUPTHER

## 2017-10-20 NOTE — TELEPHONE ENCOUNTER
Reason for Disposition   Caller has medication question only, adult not sick, and triager answers question    Protocols used: ST MEDICATION QUESTION CALL-A-ALVIN Doherty's wife Bekah called to request going over his insulin doses, Levemir, Novolog, and Victoza.  They are in [place] to visit their son who is coming from [place] to see them.  She remembered all the medication and supplies, but forgot the paper with doses / sliding scales on it.  Provided this for her, which she did write down.  No other additional needs at this time.  Message to Edgar Nova MD and Promise Steele NP in [facility].  Please contact caller directly with any additional care advice.

## 2017-10-20 NOTE — PROGRESS NOTES
"Subjective:       Patient ID: Bessy Nunez is a 57 y.o. male who presents today for a routine clinic visit for CNS vasculitis; he is accompanied by his wife.  He had cytoxan again on Wednesday of this week.      Overall, pt's wife feels the cognitive is improved.  He can walk without a cane (but unsteady).     Right now, his wife estimates that pt's cognitive performance is about 30% of normal.   One month ago, his cognitive capacity was at 10% of normal.  In general, she observes sustained improvement    SOCIAL HISTORY       Social History   Substance Use Topics    Smoking status: Never Smoker    Smokeless tobacco: Never Used    Alcohol use No      Living arrangements - the patient lives with their spouse         Objective:      Neurologic Exam     Today, pt is oriented to person, [facility] and October (but not th 20th) does know day of the week;   He knows that Halloween is coming soon;   Spells WORLD forwards and DLOR"W" backwards (improved)  Does not ask questions--docile affect     Labs:      Lab Results   Component Value Date    WBC 8.21 10/18/2017    HGB 11.2 (L) 10/18/2017    HCT 32.9 (L) 10/18/2017    MCV 84 10/18/2017     (L) 10/18/2017     CMP  Sodium   Date Value Ref Range Status   10/18/2017 140 136 - 145 mmol/L Final     Potassium   Date Value Ref Range Status   10/18/2017 4.5 3.5 - 5.1 mmol/L Final     Chloride   Date Value Ref Range Status   10/18/2017 107 95 - 110 mmol/L Final     CO2   Date Value Ref Range Status   10/18/2017 26 23 - 29 mmol/L Final     Glucose   Date Value Ref Range Status   10/18/2017 168 (H) 70 - 110 mg/dL Final     BUN, Bld   Date Value Ref Range Status   10/18/2017 21 (H) 6 - 20 mg/dL Final     Creatinine   Date Value Ref Range Status   10/18/2017 0.9 0.5 - 1.4 mg/dL Final     Calcium   Date Value Ref Range Status   10/18/2017 9.1 8.7 - 10.5 mg/dL Final     Total Protein   Date Value Ref Range Status   10/18/2017 6.5 6.0 - 8.4 g/dL Final     Albumin   Date Value Ref " Range Status   10/18/2017 3.2 (L) 3.5 - 5.2 g/dL Final   10/11/2013 4.3 3.6 - 5.1 g/dL Final     Comment:     @ Test Performed By:  [facility] AMELIA Almazan M.D.,   [address] [place] [address]  [facility] #[id number]     Total Bilirubin   Date Value Ref Range Status   10/18/2017 0.8 0.1 - 1.0 mg/dL Final     Comment:     For infants and newborns, interpretation of results should be based  on gestational age, weight and in agreement with clinical  observations.  Premature Infant recommended reference ranges:  Up to 24 hours.............<8.0 mg/dL  Up to 48 hours............<12.0 mg/dL  3-5 days..................<15.0 mg/dL  6-29 days.................<15.0 mg/dL       Alkaline Phosphatase   Date Value Ref Range Status   10/18/2017 87 55 - 135 U/L Final     AST   Date Value Ref Range Status   10/18/2017 23 10 - 40 U/L Final     ALT   Date Value Ref Range Status   10/18/2017 53 (H) 10 - 44 U/L Final     Anion Gap   Date Value Ref Range Status   10/18/2017 7 (L) 8 - 16 mmol/L Final     eGFR if    Date Value Ref Range Status   10/18/2017 >60.0 >60 mL/min/1.73 m^2 Final     eGFR if non    Date Value Ref Range Status   10/18/2017 >60.0 >60 mL/min/1.73 m^2 Final     Comment:     Calculation used to obtain the estimated glomerular filtration  rate (eGFR) is the CKD-EPI equation. Since race is unknown   in our information system, the eGFR values for   -American and Non--American patients are given   for each creatinine result.            Diagnosis   1. CNS Vasculitis  2. Cognitive dysfunction        Discussion   1. Continue monthly pulse Cytoxan; would like to continue on this protocol for about a year; pt is improving, but still has long way to go to get back to cognitive baseline; appreciate partnership with Dr. Goldstein who will manage labs and dosing as per protocol   2. Home health for PT and [facility]  3. F/u 2 months with Beti Boswell;   4. Continue prednisone 10mg for now;   5. Consider repeat MRI brain in the spring of 2018     Over 50% of this 40 minute visit was spent in direct face to face counseling of the patient and his wife about his CNS vasculitis and rationale for expedited w/u and delay of Cytoxan

## 2017-10-20 NOTE — Clinical Note
Thanks for helping with this patient.  Cognitive function is improving slowly.  Would like continue monthly pulses of cytoxan for at least a year and then reassess.

## 2017-10-27 ENCOUNTER — TELEPHONE (OUTPATIENT)
Dept: ENDOCRINOLOGY | Facility: CLINIC | Age: 57
End: 2017-10-27

## 2017-10-30 ENCOUNTER — OFFICE VISIT (OUTPATIENT)
Dept: ENDOCRINOLOGY | Facility: CLINIC | Age: 57
End: 2017-10-30
Payer: MEDICAID

## 2017-10-30 VITALS
HEIGHT: 71 IN | HEART RATE: 99 BPM | BODY MASS INDEX: 30.98 KG/M2 | SYSTOLIC BLOOD PRESSURE: 116 MMHG | DIASTOLIC BLOOD PRESSURE: 71 MMHG | WEIGHT: 221.31 LBS

## 2017-10-30 DIAGNOSIS — I77.6 CNS VASCULITIS: ICD-10-CM

## 2017-10-30 DIAGNOSIS — T38.0X5A STEROID-INDUCED HYPERGLYCEMIA: ICD-10-CM

## 2017-10-30 DIAGNOSIS — R73.9 STEROID-INDUCED HYPERGLYCEMIA: ICD-10-CM

## 2017-10-30 PROCEDURE — 99215 OFFICE O/P EST HI 40 MIN: CPT | Mod: PBBFAC,PN | Performed by: NURSE PRACTITIONER

## 2017-10-30 PROCEDURE — 99999 PR PBB SHADOW E&M-EST. PATIENT-LVL V: CPT | Mod: PBBFAC,,, | Performed by: NURSE PRACTITIONER

## 2017-10-30 PROCEDURE — 99214 OFFICE O/P EST MOD 30 MIN: CPT | Mod: S$PBB,,, | Performed by: NURSE PRACTITIONER

## 2017-10-30 NOTE — PATIENT INSTRUCTIONS
Give Basaglar once a day only - continue 25 units every night.   Reduce Humalog to just 6 units before meals with same correction same. Administer even if blood sugar is good. Skip Humalog if blood sugar is less than 80.   Continue metformin twice a day.   Give Victoza 1.8 mg once daily even if blood sugar is good.   Test blood sugar before meals and bedtime.     On chemo day, give an extra Basaglar 10 units in the morning - a one time dose.     Return to clinic in 2 weeks with written blood sugar log. Call with any issue.

## 2017-10-30 NOTE — PROGRESS NOTES
"CC: This 57 y.o. White male  is here for evaluation of  T2DM along with comorbidities indicated in the Visit Diagnosis section of this encounter.    HPI: Bessy Nunez was diagnosed with T2DM in the 1990s. Metformin started at the time of diagnosis. Admits he was in denial for years after diagnosis.     Prior visit 10/17/17He arrives with his wife Bekah  He has switched from tresiba to basaglar d/t insurance preference.   If his bg in the AM is low like , she skips basaglar, novolog, and victoza doses. Still gives him metformin though.   He has tapered down to prednisone 10 mg once daily am. Tomorrow he will be getting his monthly chemo + steroid ~ 11 am.   Patient does not like to take his oral meds so his wife must closely monitor him.   Plan Decrease Basaglar to 25 units twice daily.   Decrease Novolog to 10 units before breakfast. Continue 20 units before lunch and dinner with correction scale.     On day of chemo and 1-2 days after chemo, increase Basaglar to 32 units twice daily.   And increase Novolog to 10(12) units before breakfast, 24 units before lunch and dinner with correction scale.   Test blood sugar 4x/day. Return to clinic in 2 weeks with written logs. Call with any issues.     Interval history  Pt will continue prednisone 10 mg daily for now per Dr. Love, Neurology. Not snacking between meals as much.   His wife is withholding both morning dose of Basaglar and daytime Novolog doses since his bgs have been "good" in the 100s or below. Glucoses tend to be highest at bedtime, after he had dinner without any Novolog. She gives him a dose of Novolog at bedtime since he does eat another meal at this time.   She also withheld victoza 1x bc his bgs were good.     His wife finds his cognition is better in general. He still eats often before she gets a chance to give him his Novolog doses.     PMHX: CHASE, lipodystrophy, stroke 1995?  -diagnosed with CNS vasculitis in 5/2017  - He was admitted in " "August 2017 for a stroke and was followed by Endocrine service at [facility].       LAST DIABETES EDUCATION: 10/2013 with LEVI Thompson, CDE/RD for Bydureon training and MNT (Rec. 45-60g CHO per meal and 15g CHO per snack with low fat choices)    RECENT ILLNESSES/HOSPITALIZATIONS - -No.     HOSPITALIZED FOR DIABETES  -  No.    DIABETES MEDICATIONS: Basaglar 32 units bid (noon and 9 pm), Novolog Flexpen 20 units ac, Victoza 1.8 mg once daily at night, metformin xr 1000 mg bid   Misses medication doses - Yes -  He often eats before she gets a chance to administer novolog ac.     DM COMPLICATIONS: peripheral neuropathy    SIGNIFICANT DIABETES MED HISTORY: denies    SELF MONITORING BLOOD GLUCOSE: Checks blood glucose at home ac -4x/day.                 HYPOGLYCEMIC EPISODES: his wife finds he does get weak when his bgs are 90s.     CURRENT DIET: eats 3-4 meals/day -- he eats very large portions. Drinks water and Crystal lite.     CURRENT EXERCISE: none; used to walk for stopped 2 months       /71 (BP Location: Right arm, Patient Position: Sitting, BP Method: Large (Automatic))   Pulse 99   Ht 5' 11" (1.803 m)   Wt 100.4 kg (221 lb 5.5 oz)   BMI 30.87 kg/m²       ROS:   CONSTITUTIONAL: Appetite very high paula after meals; denies fatigue   RESPIRATORY: No shortness of breath   CARDIAC: No chest pain         PHYSICAL EXAM:  GENERAL: Well developed, well nourished. No acute distress.   PSYCH:   Well-groomed. Judgement and insight limited. Calm and cooperative, follows instructions, poor short term memory. Confused, largely nonverbal.   CHEST: Respirations even and unlabored.        Hemoglobin A1C   Date Value Ref Range Status   09/15/2017 10.8 (H) 4.0 - 5.6 % Final     Comment:     According to ADA guidelines, hemoglobin A1c <7.0% represents  optimal control in non-pregnant diabetic patients. Different  metrics may apply to specific patient populations.   Standards of Medical Care in Diabetes-2016.  For the " purpose of screening for the presence of diabetes:  <5.7%     Consistent with the absence of diabetes  5.7-6.4%  Consistent with increasing risk for diabetes   (prediabetes)  >or=6.5%  Consistent with diabetes  Currently, no consensus exists for use of hemoglobin A1c  for diagnosis of diabetes for children.  This Hemoglobin A1c assay has significant interference with fetal   hemoglobin   (HbF). The results are invalid for patients with abnormal amounts of   HbF,   including those with known Hereditary Persistence   of Fetal Hemoglobin. Heterozygous hemoglobin variants (HbAS, HbAC,   HbAD, HbAE, HbA2) do not significantly interfere with this assay;   however, presence of multiple variants in a sample may impact the %   interference.     05/13/2017 11.4 (H) 4.5 - 6.2 % Final     Comment:     According to ADA guidelines, hemoglobin A1C <7.0% represents  optimal control in non-pregnant diabetic patients.  Different  metrics may apply to specific populations.   Standards of Medical Care in Diabetes - 2016.  For the purpose of screening for the presence of diabetes:  <5.7%     Consistent with the absence of diabetes  5.7-6.4%  Consistent with increasing risk for diabetes   (prediabetes)  >or=6.5%  Consistent with diabetes  Currently no consensus exists for use of hemoglobin A1C  for diagnosis of diabetes for children.     04/07/2016 11.8 (H) 4.5 - 6.2 % Final       Chemistry        Component Value Date/Time     10/18/2017 0954    K 4.5 10/18/2017 0954     10/18/2017 0954    CO2 26 10/18/2017 0954    BUN 21 (H) 10/18/2017 0954    CREATININE 0.9 10/18/2017 0954     (H) 10/18/2017 0954        Component Value Date/Time    CALCIUM 9.1 10/18/2017 0954    ALKPHOS 87 10/18/2017 0954    AST 23 10/18/2017 0954    ALT 53 (H) 10/18/2017 0954    BILITOT 0.8 10/18/2017 0954    ESTGFRAFRICA >60.0 10/18/2017 0954    EGFRNONAA >60.0 10/18/2017 0954          Lab Results   Component Value Date    LDLCALC 69.2 09/15/2017         Ref. Range 9/15/2017 08:19   Cholesterol Latest Ref Range: 120 - 199 mg/dL 190   HDL Latest Ref Range: 40 - 75 mg/dL 51   LDL Cholesterol Latest Ref Range: 63.0 - 159.0 mg/dL 69.2   Total Cholesterol/HDL Ratio Latest Ref Range: 2.0 - 5.0  3.7   Triglycerides Latest Ref Range: 30 - 150 mg/dL 349 (H)       Lab Results   Component Value Date    MICALBCREAT Unable to calculate 04/12/2016        Ref. Range 4/12/2016 07:44   Microalbum.,U,Random Latest Units: ug/mL <2.5   Microalb Creat Ratio Latest Ref Range: 0.0 - 30.0 ug/mg Unable to calculate           ASSESSMENT and PLAN:    A1C goal: <  8.5 %    1. Uncontrolled type 2 diabetes mellitus with complication, with long-term current use of insulin  Give Basaglar once a day only - continue 25 units every night.   Reduce Humalog to just 6 units before meals with same correction same. Administer even if blood sugar is good. Skip Humalog if blood sugar is less than 80.   Continue metformin twice a day.   Give Victoza 1.8 mg once daily even if blood sugar is good.   Test blood sugar before meals and bedtime.     On chemo day, give an extra Basaglar 10 units in the morning - a one time dose.     Return to clinic in 2 weeks with written blood sugar log. Call with any issue.      2. Steroid-induced hyperglycemia  Followed by Dr. Love. Will be on monthly chemo w/ steroid in addition to daily prednisone.    3. CNS vasculitis  Followed by HEMONC and Neurology. Will be on monthly chemo w/ steroid        No orders of the defined types were placed in this encounter.       Return in about 2 weeks (around 11/13/2017).

## 2017-11-03 ENCOUNTER — TELEPHONE (OUTPATIENT)
Dept: FAMILY MEDICINE | Facility: CLINIC | Age: 57
End: 2017-11-03

## 2017-11-03 DIAGNOSIS — E11.65 TYPE 2 DIABETES MELLITUS WITH HYPERGLYCEMIA, WITH LONG-TERM CURRENT USE OF INSULIN: ICD-10-CM
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
## 2017-11-03 DIAGNOSIS — D70.1 CHEMOTHERAPY-INDUCED NEUTROPENIA: Primary | ICD-10-CM

## 2017-11-03 DIAGNOSIS — Z79.4 TYPE 2 DIABETES MELLITUS WITH HYPERGLYCEMIA, WITH LONG-TERM CURRENT USE OF INSULIN: ICD-10-CM

## 2017-11-03 DIAGNOSIS — T45.1X5A CHEMOTHERAPY-INDUCED NEUTROPENIA: Primary | ICD-10-CM

## 2017-11-03 DIAGNOSIS — Z74.09 IMPAIRED FUNCTIONAL MOBILITY, BALANCE, GAIT, AND ENDURANCE: ICD-10-CM

## 2017-11-03 NOTE — TELEPHONE ENCOUNTER
Dr Nova patient's wife states insurance will cover home health provided by [facility]. She states his neurologist placed an order but it needs to come form his PCP.

## 2017-11-03 NOTE — TELEPHONE ENCOUNTER
----- Message from Javier Sommer sent at 11/3/2017  2:08 PM CDT -----  Contact: wife  Bekah called regarding pt home health care. Bekah stated [facility] accepts her insurance and they can be reached at [phone] to discuss orders. Contact her at [phone].    Thanks-

## 2017-11-07 ENCOUNTER — TELEPHONE (OUTPATIENT)
Dept: NEUROLOGY | Facility: CLINIC | Age: 57
End: 2017-11-07

## 2017-11-07 NOTE — TELEPHONE ENCOUNTER
----- Message from Jose Wong sent at 11/7/2017  2:48 PM CST -----  Contact: Bekha (wife) @ [phone]  Bekah is asking for refill on predniSONE (DELTASONE) 20 MG tablet    [facility] [place] - [address] [address]  Phone: [phone] Fax: [phone]

## 2017-11-09 ENCOUNTER — TELEPHONE (OUTPATIENT)
Dept: FAMILY MEDICINE | Facility: CLINIC | Age: 57
End: 2017-11-09

## 2017-11-09 DIAGNOSIS — R27.0 ATAXIA: Primary | ICD-10-CM

## 2017-11-09 DIAGNOSIS — I77.6 CNS VASCULITIS: ICD-10-CM

## 2017-11-09 DIAGNOSIS — E11.65 TYPE 2 DIABETES MELLITUS WITH HYPERGLYCEMIA, WITH LONG-TERM CURRENT USE OF INSULIN: ICD-10-CM

## 2017-11-09 DIAGNOSIS — I63.81 LACUNAR STROKE OF LEFT SUBTHALAMIC REGION: ICD-10-CM

## 2017-11-09 DIAGNOSIS — Z91.81 HISTORY OF RECENT FALL: ICD-10-CM

## 2017-11-09 DIAGNOSIS — Z79.4 TYPE 2 DIABETES MELLITUS WITH HYPERGLYCEMIA, WITH LONG-TERM CURRENT USE OF INSULIN: ICD-10-CM

## 2017-11-09 NOTE — TELEPHONE ENCOUNTER
----- Message from Clarissa Bangura sent at 11/9/2017 11:47 AM CST -----  Lexy moise [facility] is calling in regards to patient's home health. She states that [facility] declined the order, and she requests for the home health order to be sent to [facility] in [place]. She states that there is no worry for out-of-network.  She can be reached at [phone]. Thank you!

## 2017-11-10 ENCOUNTER — TELEPHONE (OUTPATIENT)
Dept: NEUROLOGY | Facility: CLINIC | Age: 57
End: 2017-11-10

## 2017-11-10 DIAGNOSIS — S32.040A CLOSED COMPRESSION FRACTURE OF FOURTH LUMBAR VERTEBRA, INITIAL ENCOUNTER: Primary | ICD-10-CM

## 2017-11-10 RX ORDER — PREDNISONE 10 MG/1
10 TABLET ORAL DAILY
Qty: 30 TABLET | Refills: 1 | Status: SHIPPED | OUTPATIENT
Start: 2017-11-10 | End: 2017-12-10

## 2017-11-10 NOTE — TELEPHONE ENCOUNTER
I spoke with Bekah, who said Bessy has been sleeping all day, not wanting to eat. She feels he is depressed. She also mentioned he does not currently have Home Health. They were sent to [facility], but were told they did not offer the services he needs.

## 2017-11-10 NOTE — TELEPHONE ENCOUNTER
----- Message from Carlos Delaney sent at 11/10/2017  2:53 PM CST -----  Contact: Pt   Pt would like a call back from nurse    Did not state the reason for call back, but stated she never receives calls back     Can be reached at [phone]

## 2017-11-13 ENCOUNTER — TELEPHONE (OUTPATIENT)
Dept: ENDOCRINOLOGY | Facility: CLINIC | Age: 57
End: 2017-11-13

## 2017-11-14 ENCOUNTER — HOSPITAL ENCOUNTER (EMERGENCY)
Facility: HOSPITAL | Age: 57
Discharge: HOME | End: 2017-11-14
Attending: FAMILY MEDICINE
Payer: MEDICAID

## 2017-11-14 VITALS
RESPIRATION RATE: 18 BRPM | DIASTOLIC BLOOD PRESSURE: 56 MMHG | TEMPERATURE: 99 F | HEART RATE: 87 BPM | SYSTOLIC BLOOD PRESSURE: 105 MMHG

## 2017-11-14 DIAGNOSIS — S20.222A BACK CONTUSION, LEFT, INITIAL ENCOUNTER: Primary | ICD-10-CM
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
## 2017-11-14 PROCEDURE — 99283 EMERGENCY DEPT VISIT LOW MDM: CPT

## 2017-11-14 PROCEDURE — 25000003 PHARM REV CODE 250: Performed by: FAMILY MEDICINE

## 2017-11-14 RX ORDER — KETOROLAC TROMETHAMINE 10 MG/1
10 TABLET, FILM COATED ORAL
Status: COMPLETED | OUTPATIENT
Start: 2017-11-14 | End: 2017-11-14

## 2017-11-14 RX ADMIN — KETOROLAC TROMETHAMINE 10 MG: 10 TABLET, FILM COATED ORAL at 08:11

## 2017-11-15 ENCOUNTER — TELEPHONE (OUTPATIENT)
Dept: NEUROLOGY | Facility: CLINIC | Age: 57
End: 2017-11-15

## 2017-11-15 ENCOUNTER — TELEPHONE (OUTPATIENT)
Dept: FAMILY MEDICINE | Facility: CLINIC | Age: 57
End: 2017-11-15

## 2017-11-15 DIAGNOSIS — Z79.4 TYPE 2 DIABETES MELLITUS WITH DIABETIC POLYNEUROPATHY, WITH LONG-TERM CURRENT USE OF INSULIN: ICD-10-CM
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
## 2017-11-15 DIAGNOSIS — I77.6 CNS VASCULITIS: ICD-10-CM

## 2017-11-15 DIAGNOSIS — E11.42 TYPE 2 DIABETES MELLITUS WITH DIABETIC POLYNEUROPATHY, WITH LONG-TERM CURRENT USE OF INSULIN: ICD-10-CM

## 2017-11-15 DIAGNOSIS — S32.040A CLOSED COMPRESSION FRACTURE OF FOURTH LUMBAR VERTEBRA, INITIAL ENCOUNTER: ICD-10-CM

## 2017-11-15 NOTE — PROVIDER PROGRESS NOTES - EMERGENCY DEPT.
Encounter Date: 11/14/2017    Emergency Room Physician: X-Ray Results          Radiology called me this morning alerting me to a possible L4 compression fracture on x-ray  I immediately called the patient who states he is having some back pain this morning and home  He is not having any leg weakness or signs of cauda equina syndrome on my phone interview  I told the patient's wife that I would try and make them a spine physician appointment ASAP    I called [facility], who stated they would not see him due to his Medicaid status  I then called [facility] orthopedics who stated they do not see any spine patients  I then called [facility], no one answered that line this morning    I then called the patient's wife back and gave her the number to [place]  I counseled her on warning signs and symptoms associated with back injury  I will also copy all of the patient's [facility] physicians to make sure this issues taken care of  Wife voiced understanding and will follow-up with orthopedic spine service at appointment       Jayesh Cho M.D. 11:19 AM 11/15/2017

## 2017-11-15 NOTE — TELEPHONE ENCOUNTER
----- Message from Nancy Canela sent at 11/15/2017 10:57 AM CST -----  Contact: wife  Pt wife calling to discuss pt ED visit. Please call [phone]

## 2017-11-15 NOTE — ED PROVIDER NOTES
Encounter Date: 11/14/2017       History     Chief Complaint   Patient presents with    Fall    Back Pain     The history is provided by the patient and the spouse. No  was used.   Fall   The accident occurred 2 to 3 hours ago. The fall occurred while standing. He fell from a height of 3 to 5 ft. He landed on concrete. The pain is present in the back. The pain is at a severity of 0/10. He was ambulatory at the scene. There was no entrapment after the fall. There was no drug use involved in the accident. There was no alcohol use involved in the accident. Associated symptoms include back pain. Pertinent negatives include no neck pain, no paresthesias, no paralysis, no visual change, no fever, no numbness, no abdominal pain, no bowel incontinence, no nausea, no vomiting, no hematuria, no headaches, no hearing loss, no loss of consciousness and no tingling. The symptoms are aggravated by activity. He has tried NSAIDs for the symptoms. The treatment provided moderate relief.     Patient was stepping up when he lost his balance and fell backwards in his back hit a brick wall.  He has no complaint of pain at this time.  No head injury.  He did bruise slightly his left elbow but he has no pain in that site this time.  No nausea or vomiting.    Review of patient's allergies indicates:  No Known Allergies  Past Medical History:   Diagnosis Date    CNS vasculitis 6/2/2017    Follows with Dr. Love By mri.  Several active lesions, many old. 5/13: MRA brain/neck, MRI brain w/wo contrast show no vascular occlusion, multiple foci of hyperintensity in deep cerebral periventricular white matter in pattern of demyelinating process.  5/17: MRI spine performed, no spinal cord lesions. Hospitalization 6/2017. EEG was performed negative for seizures.  Repeat MRI showing new lesion, question whether this was demyelination versus CVA. 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 Findings compatible with a small acute lacunar infarct adjacent to the left frontal horn.  Nonspecific enhancement just inferior to this region and extending into the left basal ganglia, as well as within the inferior aspect of the left cerebellum.  No evidence of a focal mass. 2.  Extensive increased signal intensity involving the periventricular white matter compatible with demyelinating disease versus vasculitis. 3.  Clinical correlation and followup recommended. 4.  This reportedly flattened in the EPIC and the corpus callosum medical record system.    Mixed hyperlipidemia 11/9/2012    NAFLD (nonalcoholic fatty liver disease) 10/11/2013    CHASE (nonalcoholic steatohepatitis)     Obesity     Stroke     Type 2 diabetes mellitus with diabetic polyneuropathy, with long-term current use of insulin 5/14/2017    Type 2 diabetes mellitus with hyperglycemia, with long-term current use of insulin 10/11/2013     Past Surgical History:   Procedure Laterality Date    SKIN CANCER EXCISION       Family History   Problem Relation Age of Onset    Heart disease Mother     Hypertension Mother     Heart failure Mother     Cancer Father      lung    Diabetes Maternal Aunt      Social History   Substance Use Topics    Smoking status: Never Smoker    Smokeless tobacco: Never Used    Alcohol use No     Review of Systems   Constitutional: Negative for fever.   Gastrointestinal: Negative for abdominal pain, bowel incontinence, nausea and vomiting.   Genitourinary: Negative for hematuria.   Musculoskeletal: Positive for back pain. Negative for neck pain.   Neurological: Negative for tingling, loss of consciousness, numbness, headaches and paresthesias.       Physical Exam     Initial Vitals [11/14/17 1942]   BP Pulse Resp Temp SpO2   (!)  105/56 87 18 98.6 °F (37 °C) --      MAP       72.33         Physical Exam    Nursing note and vitals reviewed.  Constitutional: He appears well-developed and well-nourished.   Patient no acute distress.   HENT:   Head: Normocephalic and atraumatic.   Left Ear: External ear normal.   Musculoskeletal:        Lumbar back: He exhibits tenderness. He exhibits normal range of motion, no bony tenderness, no swelling, no edema, no deformity, no laceration, no pain, no spasm and normal pulse.        Back:    Neurological: He is alert and oriented to person, place, and time.   Psychiatric: He has a normal mood and affect.         ED Course   Procedures  Labs Reviewed - No data to display       X-Rays:   Independently Interpreted Readings:   Other Readings:  Thoracic and cervical vertebral x-ray no abnormalities noted.                      ED Course      Clinical Impression:   The primary encounter diagnosis was Back contusion, left, initial encounter. A diagnosis of Fall was also pertinent to this visit.                           Claudio Jacome MD  11/14/17 3036

## 2017-11-15 NOTE — TELEPHONE ENCOUNTER
Pt is having back pain with a small fracture on his lower back back. Bekah said she is calling Dr. Nova to address patient's back pain. She said he has been falling all week. Pt no longer has Home Health. [facility] for Medicaid contacted her this morning and said [facility] in [place] can provide these services with order for Dr. Love.

## 2017-11-15 NOTE — TELEPHONE ENCOUNTER
Spoke with wife, her 's  is working on getting him expedited referral.  I anticipate she may be calling me for this.      Will submit referral for [facility] for PT.  See other message.

## 2017-11-15 NOTE — TELEPHONE ENCOUNTER
Spoke with wife she said that her  has been falling a lot and that he went to the ER yesterday and was told that on his xray that they took showed a fracture in his back and was told to see a back specialist for this. She was told that there is one at [facility] if so needs a referral. She has been given him Advil and wanted to know that was alright to give him. She said that he has been falling a lot and has never gotten any help with this.

## 2017-11-15 NOTE — TELEPHONE ENCOUNTER
----- Message from Mahnaz Crespo sent at 11/15/2017 12:47 PM CST -----  Contact: Bekah (wife) @ [phone]  Pt was injured his back last night really bad and went to the Ed.  Pls call to discuss.

## 2017-11-15 NOTE — TELEPHONE ENCOUNTER
Bekah is aware [facility] referral has been placed and someone from their department should be reaching out them to schedule him. She is also aware Dr. Love would like to hold off on ordering HH PT at this time. Bekah also mentioned Dr. Nova, PCP, said he would be placing HH orders for pt this afternoon.

## 2017-11-16 ENCOUNTER — TELEPHONE (OUTPATIENT)
Dept: NEUROLOGY | Facility: CLINIC | Age: 57
End: 2017-11-16

## 2017-11-16 ENCOUNTER — TELEPHONE (OUTPATIENT)
Dept: FAMILY MEDICINE | Facility: CLINIC | Age: 57
End: 2017-11-16

## 2017-11-16 NOTE — TELEPHONE ENCOUNTER
----- Message from Mahnaz Crespo sent at 11/16/2017  3:36 PM CST -----  Contact: Bekah (wife) @ [phone]  Pts wife is calling to speak with Charis.  She says pt is scheduled for chemo on tomorrow.  She is calling to see if he should have it or if Dr Love is wanting to hold him off on it.  pls call.

## 2017-11-16 NOTE — TELEPHONE ENCOUNTER
Call returned to [facility]. Informed her that the plan is to still move forward with Cytoxan and associated appts tomorrow.    Appt scheduled for lumbar MRI on Monday at 11:30am. She is aware of date, time, location. Message sent to Dr Maren mckeon.

## 2017-11-16 NOTE — TELEPHONE ENCOUNTER
----- Message from Javier Sommer sent at 11/15/2017 11:21 AM CST -----  Contact: Uzair/Tania Marin called stating [facility] declined pt Physical Therapy. Would like orders for home health faxed to [facility]. Contact Tania at [phone].    Thanks-

## 2017-11-17 ENCOUNTER — INFUSION (OUTPATIENT)
Dept: INFUSION THERAPY | Facility: HOSPITAL | Age: 57
End: 2017-11-17
Attending: INTERNAL MEDICINE
Payer: MEDICAID

## 2017-11-17 ENCOUNTER — OFFICE VISIT (OUTPATIENT)
Dept: HEMATOLOGY/ONCOLOGY | Facility: CLINIC | Age: 57
End: 2017-11-17
Payer: MEDICAID

## 2017-11-17 VITALS
BODY MASS INDEX: 31.18 KG/M2 | HEIGHT: 71 IN | HEART RATE: 103 BPM | DIASTOLIC BLOOD PRESSURE: 84 MMHG | TEMPERATURE: 99 F | OXYGEN SATURATION: 95 % | RESPIRATION RATE: 14 BRPM | SYSTOLIC BLOOD PRESSURE: 131 MMHG | WEIGHT: 222.69 LBS

## 2017-11-17 DIAGNOSIS — G35 MULTIPLE SCLEROSIS: Primary | ICD-10-CM

## 2017-11-17 DIAGNOSIS — Z09 CHEMOTHERAPY FOLLOW-UP EXAMINATION: ICD-10-CM

## 2017-11-17 DIAGNOSIS — I77.6 CNS VASCULITIS: Primary | ICD-10-CM

## 2017-11-17 PROCEDURE — 96367 TX/PROPH/DG ADDL SEQ IV INF: CPT

## 2017-11-17 PROCEDURE — 96411 CHEMO IV PUSH ADDL DRUG: CPT

## 2017-11-17 PROCEDURE — 99214 OFFICE O/P EST MOD 30 MIN: CPT | Mod: S$PBB,,, | Performed by: INTERNAL MEDICINE

## 2017-11-17 PROCEDURE — 99999 PR PBB SHADOW E&M-EST. PATIENT-LVL V: CPT | Mod: PBBFAC,,, | Performed by: INTERNAL MEDICINE

## 2017-11-17 PROCEDURE — 63600175 PHARM REV CODE 636 W HCPCS: Performed by: INTERNAL MEDICINE

## 2017-11-17 PROCEDURE — 25000003 PHARM REV CODE 250: Performed by: INTERNAL MEDICINE

## 2017-11-17 PROCEDURE — 99215 OFFICE O/P EST HI 40 MIN: CPT | Mod: PBBFAC,25 | Performed by: INTERNAL MEDICINE

## 2017-11-17 PROCEDURE — 96413 CHEMO IV INFUSION 1 HR: CPT

## 2017-11-17 RX ORDER — HEPARIN 100 UNIT/ML
500 SYRINGE INTRAVENOUS
Status: CANCELLED | OUTPATIENT
Start: 2017-11-17

## 2017-11-17 RX ORDER — HEPARIN 100 UNIT/ML
500 SYRINGE INTRAVENOUS
Status: DISCONTINUED | OUTPATIENT
Start: 2017-11-17 | End: 2017-11-17 | Stop reason: HOSPADM

## 2017-11-17 RX ORDER — SODIUM CHLORIDE 0.9 % (FLUSH) 0.9 %
10 SYRINGE (ML) INJECTION
Status: CANCELLED | OUTPATIENT
Start: 2017-11-17

## 2017-11-17 RX ORDER — SODIUM CHLORIDE 0.9 % (FLUSH) 0.9 %
10 SYRINGE (ML) INJECTION
Status: DISCONTINUED | OUTPATIENT
Start: 2017-11-17 | End: 2017-11-17 | Stop reason: HOSPADM

## 2017-11-17 RX ADMIN — CYCLOPHOSPHAMIDE 1320 MG: 1 INJECTION, POWDER, FOR SOLUTION INTRAVENOUS; ORAL at 12:11

## 2017-11-17 RX ADMIN — MESNA 1320 MG: 100 INJECTION, SOLUTION INTRAVENOUS at 12:11

## 2017-11-17 RX ADMIN — DEXAMETHASONE SODIUM PHOSPHATE: 4 INJECTION, SOLUTION INTRAMUSCULAR; INTRAVENOUS at 11:11

## 2017-11-17 NOTE — Clinical Note
Cbc, cmp, type and screen lab only in 2 weeks -same labs, MD fine, chair for cytoxan infusion in 4 weeks

## 2017-11-17 NOTE — PLAN OF CARE
Problem: Patient Care Overview  Goal: Plan of Care Review  Outcome: Ongoing (interventions implemented as appropriate)  Pt tolerated Cytoxan well.  No s/s of reaction. Vitals stable, NAD.

## 2017-11-17 NOTE — PROGRESS NOTES
SECTION OF HEMATOLOGY AND BONE MARROW TRANSPLANT  New Patient Visit   11/20/2017  Referred by:  No ref. provider found  Referred for:     CHIEF COMPLAINT:   Chief Complaint   Patient presents with    Pain     Lower back pain due to poatient falling down on this past Tuesday.       HISTORY OF PRESENT ILLNESS:   Presents for cycle #4 HD CTX for MS.   Per wife confusion stable from last appt.  Labs reviewed. Patient examined.  No contraindication to proceed with CTX.  Denies fever, chills, nightsweats, bleeding, brusing, lymphadenopathy, signs/symptoms of splenomegaly.    Neuro symptoms unchanged per wife.  Patient appears confused during appt.   PAST MEDICAL HISTORY:   Past Medical History:   Diagnosis Date    CNS vasculitis 6/2/2017    Follows with Dr. Love By mri.  Several active lesions, many old. 5/13: MRA brain/neck, MRI brain w/wo contrast show no vascular occlusion, multiple foci of hyperintensity in deep cerebral periventricular white matter in pattern of demyelinating process.  5/17: MRI spine performed, no spinal cord lesions. Hospitalization 6/2017. EEG was performed negative for seizures.  Repeat MRI showing new lesion, question whether this was demyelination versus CVA. 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 Findings compatible with a small acute lacunar infarct adjacent to the left frontal horn.  Nonspecific enhancement just inferior to this region and extending into the left basal ganglia, as well as within the inferior aspect of the left cerebellum.  No evidence of a focal mass. 2.  Extensive increased signal intensity involving the periventricular white matter compatible with demyelinating disease versus vasculitis. 3.  Clinical correlation and followup recommended. 4.  This  "reportedly flattened in the EPIC and the corpus callosum medical record system.    Mixed hyperlipidemia 11/9/2012    NAFLD (nonalcoholic fatty liver disease) 10/11/2013    CHASE (nonalcoholic steatohepatitis)     Obesity     Stroke     Type 2 diabetes mellitus with diabetic polyneuropathy, with long-term current use of insulin 5/14/2017    Type 2 diabetes mellitus with hyperglycemia, with long-term current use of insulin 10/11/2013       PAST SURGICAL HISTORY:   Past Surgical History:   Procedure Laterality Date    SKIN CANCER EXCISION         PAST SOCIAL HISTORY:   reports that he has never smoked. He has never used smokeless tobacco. He reports that he does not drink alcohol or use drugs.    FAMILY HISTORY:  Family History   Problem Relation Age of Onset    Heart disease Mother     Hypertension Mother     Heart failure Mother     Cancer Father      lung    Diabetes Maternal Aunt        CURRENT MEDICATIONS:   Current Outpatient Prescriptions   Medication Sig    aspirin (ECOTRIN) 81 MG EC tablet Take 81 mg by mouth once daily.    atenolol (TENORMIN) 50 MG tablet Take 1 tablet (50 mg total) by mouth once daily.    atorvastatin (LIPITOR) 40 MG tablet Take 1 tablet (40 mg total) by mouth once daily.    blood sugar diagnostic Strp To check BG 4 times daily, to use with insurance preferred meter    blood-glucose meter kit To check BG 4 times daily, one touch verio meter. Dx code e11.65    diltiaZEM (DILACOR XR) 240 MG CDCR Take 1 capsule (240 mg total) by mouth once daily.    insulin glargine (BASAGLAR KWIKPEN) 100 unit/mL (3 mL) InPn pen Inject 32 Units into the skin 2 (two) times daily. Once in the morning at 9 am and once in the evening at 9 pm.    insulin lispro (HUMALOG) 100 unit/mL injection Inject 20 units before meals    insulin needles, disposable, (BD ULTRA-FINE ANA PEN NEEDLES) 32 x 5/32 " Ndle Inject twice daily    insulin syringe-needle U-100 (INSULIN SYRINGE) 1/2 mL 30 gauge x 5/16 " Syrg Use 3x/day    lancets Misc To check BG 4 times daily, one touch verio meter. Dx code e11.65    levetiracetam (KEPPRA) 500 MG Tab Take 1 tablet (500 mg total) by mouth 2 (two) times daily.    liraglutide 0.6 mg/0.1 mL, 18 mg/3 mL, subq PNIJ (VICTOZA 3-TOMASZ) 0.6 mg/0.1 mL (18 mg/3 mL) PnIj Inject 1.8 mg into the skin once daily.    metformin (GLUCOPHAGE-XR) 500 MG 24 hr tablet Take 2 tablets (1,000 mg total) by mouth 2 (two) times daily with meals.    omega-3 fatty acids-vitamin E (FISH OIL) 1,000 mg Cap Take 1 capsule by mouth 2 (two) times daily.    ONETOUCH DELICA LANCETS 33 gauge Misc     predniSONE (DELTASONE) 10 MG tablet Take 1 tablet (10 mg total) by mouth once daily.    ranitidine (ZANTAC) 150 MG tablet Take 1 tablet (150 mg total) by mouth 2 (two) times daily.    sertraline (ZOLOFT) 100 MG tablet 1 and half tablet daily (Patient taking differently: Take 150 mg by mouth once daily. )    sulfamethoxazole-trimethoprim 800-160mg (BACTRIM DS) 800-160 mg Tab Take every Monday, Wednesday, and Friday.  Infectious Disease will decide if this is needed in 3-4 months.    valsartan (DIOVAN) 320 MG tablet Take 1 tablet (320 mg total) by mouth once daily.    vitamin D 1000 units Tab Take 5 tablets (5,000 Units total) by mouth once daily.    calcium carbonate (OS-DONNA) 500 mg calcium (1,250 mg) tablet Take 2 tablets (1,000 mg total) by mouth once.     No current facility-administered medications for this visit.      ALLERGIES:   Review of patient's allergies indicates:  No Known Allergies          REVIEW OF SYSTEMS:   General ROS: negative  Psychological ROS: negative  Ophthalmic ROS: negative  ENT ROS: negative  Allergy and Immunology ROS: negative  Hematological and Lymphatic ROS: negative  Endocrine ROS: negative  Respiratory ROS: negative  Cardiovascular ROS: negative  Gastrointestinal ROS: negative  Genito-Urinary ROS: negative  Musculoskeletal ROS: negative  Neurological ROS: see HPI  Dermatological  ROS: negative    PHYSICAL EXAM:   Vitals:    11/17/17 1101   BP: 131/84   Pulse: 103   Resp: 14   Temp: 98.5 °F (36.9 °C)       General - well developed, well nourished, no apparent distress  Head & Face - no sinus tenderness  Eyes - normal conjunctivae and lids   ENT - normal external auditory canals and tympanic membranes bilaterally oropharynx clear,  Normal dentition and gums  Neck - normal thyroid  Chest and Lung - normal respiratory effort, clear to auscultation bilaterally   Cardiovascular - RRR with no MGR, normal S1 and S2; no pedal edema  Abdomen -  soft, nontender, no palpable hepatomegaly or splenomegaly  Lymph - no palpable lymphadenopathy  Extremities - unremarkable nails and digits  Heme - no bruising, petechiae, pallor  Skin - no rashes or lesions  Psych - appropriate mood and affect      ECOG Performance Status: (foot note - ECOG PS provided by Eastern Cooperative Oncology Group) 1 - Symptomatic but completely ambulatory    Karnofsky Performance Score:  90%- Able to Carry on Normal Activity: Minor Symptoms of Disease  DATA:   Lab Results   Component Value Date    WBC 8.38 11/17/2017    HGB 9.6 (L) 11/17/2017    HCT 28.0 (L) 11/17/2017    MCV 85 11/17/2017     11/17/2017     Gran #   Date Value Ref Range Status   11/17/2017 5.6 1.8 - 7.7 K/uL Final     Gran%   Date Value Ref Range Status   11/17/2017 67.2 38.0 - 73.0 % Final     CMP  Sodium   Date Value Ref Range Status   11/17/2017 143 136 - 145 mmol/L Final     Potassium   Date Value Ref Range Status   11/17/2017 3.9 3.5 - 5.1 mmol/L Final     Chloride   Date Value Ref Range Status   11/17/2017 109 95 - 110 mmol/L Final     CO2   Date Value Ref Range Status   11/17/2017 25 23 - 29 mmol/L Final     Glucose   Date Value Ref Range Status   11/17/2017 223 (H) 70 - 110 mg/dL Final     BUN, Bld   Date Value Ref Range Status   11/17/2017 21 (H) 6 - 20 mg/dL Final     Creatinine   Date Value Ref Range Status   11/17/2017 1.4 0.5 - 1.4 mg/dL Final      Calcium   Date Value Ref Range Status   11/17/2017 9.2 8.7 - 10.5 mg/dL Final     Total Protein   Date Value Ref Range Status   11/17/2017 7.1 6.0 - 8.4 g/dL Final     Albumin   Date Value Ref Range Status   11/17/2017 3.4 (L) 3.5 - 5.2 g/dL Final   10/11/2013 4.3 3.6 - 5.1 g/dL Final     Comment:     @ Test Performed By:  [facility] [place]  AMLEIA Vázquez M.D.,   [id number] [place] [id number]  [facility] #[id number]     Total Bilirubin   Date Value Ref Range Status   11/17/2017 0.4 0.1 - 1.0 mg/dL Final     Comment:     For infants and newborns, interpretation of results should be based  on gestational age, weight and in agreement with clinical  observations.  Premature Infant recommended reference ranges:  Up to 24 hours.............<8.0 mg/dL  Up to 48 hours............<12.0 mg/dL  3-5 days..................<15.0 mg/dL  6-29 days.................<15.0 mg/dL       Alkaline Phosphatase   Date Value Ref Range Status   11/17/2017 72 55 - 135 U/L Final     AST   Date Value Ref Range Status   11/17/2017 16 10 - 40 U/L Final     ALT   Date Value Ref Range Status   11/17/2017 16 10 - 44 U/L Final     Anion Gap   Date Value Ref Range Status   11/17/2017 9 8 - 16 mmol/L Final     eGFR if    Date Value Ref Range Status   11/17/2017 >60.0 >60 mL/min/1.73 m^2 Final     eGFR if non    Date Value Ref Range Status   11/17/2017 54.6 (A) >60 mL/min/1.73 m^2 Final     Comment:     Calculation used to obtain the estimated glomerular filtration  rate (eGFR) is the CKD-EPI equation.            ASSESSMENT AND PLAN:   Encounter Diagnoses   Name Primary?    Multiple sclerosis Yes    Chemotherapy follow-up examination      -proceed with HDCTX 600mg/m2 dose #3 with mesna support per MS protocol per Dr. Love  -per neurology, plan for approximate 1 year of monthly therapy  -will inquire with Dr Love plans for future CTX doses  -he has some likely  chemo induced mild cytopenias and hood; recommend monitoring with serial labs     Follow Up:  Cbc, cmp, type and screen lab only in 2 weeks  -same labs, MD fine, chair for cytoxan infusion in 4 weeks   Doron Goldstein MD  Hematology/Oncology/Bone Marrow Transplant

## 2017-11-20 ENCOUNTER — TELEPHONE (OUTPATIENT)
Dept: NEUROLOGY | Facility: CLINIC | Age: 57
End: 2017-11-20

## 2017-11-20 ENCOUNTER — HOSPITAL ENCOUNTER (OUTPATIENT)
Dept: RADIOLOGY | Facility: HOSPITAL | Age: 57
Discharge: HOME | End: 2017-11-20
Attending: PSYCHIATRY & NEUROLOGY
Payer: MEDICAID

## 2017-11-20 ENCOUNTER — HOSPITAL ENCOUNTER (OUTPATIENT)
Facility: HOSPITAL | Age: 57
Discharge: HOME HEALTH | DRG: 552 | End: 2017-11-24
Attending: EMERGENCY MEDICINE | Admitting: HOSPITALIST
Payer: MEDICAID

## 2017-11-20 DIAGNOSIS — I10 ESSENTIAL HYPERTENSION: ICD-10-CM

## 2017-11-20 DIAGNOSIS — S32.040A CLOSED COMPRESSION FRACTURE OF FOURTH LUMBAR VERTEBRA, INITIAL ENCOUNTER: Primary | ICD-10-CM

## 2017-11-20 DIAGNOSIS — S32.040A CLOSED COMPRESSION FRACTURE OF FOURTH LUMBAR VERTEBRA, INITIAL ENCOUNTER: ICD-10-CM

## 2017-11-20 DIAGNOSIS — I63.81 LACUNAR STROKE OF LEFT SUBTHALAMIC REGION: ICD-10-CM

## 2017-11-20 DIAGNOSIS — R27.0 ATAXIA: ICD-10-CM

## 2017-11-20 DIAGNOSIS — S32.041A CLOSED STABLE BURST FRACTURE OF FOURTH LUMBAR VERTEBRA, INITIAL ENCOUNTER: ICD-10-CM

## 2017-11-20 DIAGNOSIS — R41.0 EPISODIC CONFUSION: ICD-10-CM

## 2017-11-20 DIAGNOSIS — I77.6 CNS VASCULITIS: ICD-10-CM

## 2017-11-20 PROBLEM — S32.049A L4 VERTEBRAL FRACTURE: Status: ACTIVE | Noted: 2017-11-20

## 2017-11-20 LAB
ALBUMIN SERPL BCP-MCNC: 3.3 G/DL
ALP SERPL-CCNC: 61 U/L
ALT SERPL W/O P-5'-P-CCNC: 17 U/L
ANION GAP SERPL CALC-SCNC: 11 MMOL/L
APTT BLDCRRT: 21.5 SEC
AST SERPL-CCNC: 16 U/L
BASOPHILS # BLD AUTO: 0 K/UL
BASOPHILS NFR BLD: 0 %
BILIRUB SERPL-MCNC: 0.6 MG/DL
BILIRUB UR QL STRIP: NEGATIVE
BUN SERPL-MCNC: 27 MG/DL
CALCIUM SERPL-MCNC: 9 MG/DL
CHLORIDE SERPL-SCNC: 104 MMOL/L
CLARITY UR REFRACT.AUTO: CLEAR
CO2 SERPL-SCNC: 22 MMOL/L
COLOR UR AUTO: YELLOW
CREAT SERPL-MCNC: 0.9 MG/DL
DIFFERENTIAL METHOD: ABNORMAL
EOSINOPHIL # BLD AUTO: 0 K/UL
EOSINOPHIL NFR BLD: 0.1 %
ERYTHROCYTE [DISTWIDTH] IN BLOOD BY AUTOMATED COUNT: 15.9 %
EST. GFR  (AFRICAN AMERICAN): >60 ML/MIN/1.73 M^2
EST. GFR  (NON AFRICAN AMERICAN): >60 ML/MIN/1.73 M^2
ESTIMATED AVG GLUCOSE: 146 MG/DL
GLUCOSE SERPL-MCNC: 208 MG/DL
GLUCOSE UR QL STRIP: ABNORMAL
HBA1C MFR BLD HPLC: 6.7 %
HCT VFR BLD AUTO: 26.7 %
HGB BLD-MCNC: 9.4 G/DL
HGB UR QL STRIP: NEGATIVE
IMM GRANULOCYTES # BLD AUTO: 0.03 K/UL
IMM GRANULOCYTES NFR BLD AUTO: 0.4 %
INR PPP: 1
KETONES UR QL STRIP: NEGATIVE
LEUKOCYTE ESTERASE UR QL STRIP: NEGATIVE
LYMPHOCYTES # BLD AUTO: 0.6 K/UL
LYMPHOCYTES NFR BLD: 8.4 %
MCH RBC QN AUTO: 29.9 PG
MCHC RBC AUTO-ENTMCNC: 35.2 G/DL
MCV RBC AUTO: 85 FL
MONOCYTES # BLD AUTO: 0.6 K/UL
MONOCYTES NFR BLD: 8.1 %
NEUTROPHILS # BLD AUTO: 6.4 K/UL
NEUTROPHILS NFR BLD: 83 %
NITRITE UR QL STRIP: NEGATIVE
NRBC BLD-RTO: 0 /100 WBC
PH UR STRIP: 5 [PH] (ref 5–8)
PLATELET # BLD AUTO: 159 K/UL
PMV BLD AUTO: 9.4 FL
POCT GLUCOSE: 181 MG/DL (ref 70–110)
POTASSIUM SERPL-SCNC: 4.1 MMOL/L
PROT SERPL-MCNC: 6.4 G/DL
PROT UR QL STRIP: NEGATIVE
PROTHROMBIN TIME: 10.4 SEC
RBC # BLD AUTO: 3.14 M/UL
SODIUM SERPL-SCNC: 137 MMOL/L
SP GR UR STRIP: 1.01 (ref 1–1.03)
URN SPEC COLLECT METH UR: ABNORMAL
UROBILINOGEN UR STRIP-ACNC: NEGATIVE EU/DL
WBC # BLD AUTO: 7.66 K/UL

## 2017-11-20 PROCEDURE — 80053 COMPREHEN METABOLIC PANEL: CPT

## 2017-11-20 PROCEDURE — 72148 MRI LUMBAR SPINE W/O DYE: CPT | Mod: 26,,, | Performed by: RADIOLOGY

## 2017-11-20 PROCEDURE — 25000003 PHARM REV CODE 250: Performed by: HOSPITALIST

## 2017-11-20 PROCEDURE — G0378 HOSPITAL OBSERVATION PER HR: HCPCS

## 2017-11-20 PROCEDURE — 72148 MRI LUMBAR SPINE W/O DYE: CPT | Mod: TC

## 2017-11-20 PROCEDURE — 81003 URINALYSIS AUTO W/O SCOPE: CPT

## 2017-11-20 PROCEDURE — 99285 EMERGENCY DEPT VISIT HI MDM: CPT | Mod: ,,, | Performed by: EMERGENCY MEDICINE

## 2017-11-20 PROCEDURE — 85610 PROTHROMBIN TIME: CPT

## 2017-11-20 PROCEDURE — 99223 1ST HOSP IP/OBS HIGH 75: CPT | Mod: ,,, | Performed by: HOSPITALIST

## 2017-11-20 PROCEDURE — 99284 EMERGENCY DEPT VISIT MOD MDM: CPT

## 2017-11-20 PROCEDURE — 63600175 PHARM REV CODE 636 W HCPCS: Performed by: HOSPITALIST

## 2017-11-20 PROCEDURE — 20600001 HC STEP DOWN PRIVATE ROOM

## 2017-11-20 PROCEDURE — 85025 COMPLETE CBC W/AUTO DIFF WBC: CPT

## 2017-11-20 PROCEDURE — 85730 THROMBOPLASTIN TIME PARTIAL: CPT

## 2017-11-20 PROCEDURE — 99232 SBSQ HOSP IP/OBS MODERATE 35: CPT | Mod: ,,, | Performed by: PSYCHIATRY & NEUROLOGY

## 2017-11-20 PROCEDURE — 99284 EMERGENCY DEPT VISIT MOD MDM: CPT | Mod: ,,, | Performed by: NEUROLOGICAL SURGERY

## 2017-11-20 PROCEDURE — 83036 HEMOGLOBIN GLYCOSYLATED A1C: CPT

## 2017-11-20 RX ORDER — ATORVASTATIN CALCIUM 20 MG/1
40 TABLET, FILM COATED ORAL DAILY
Status: DISCONTINUED | OUTPATIENT
Start: 2017-11-21 | End: 2017-11-24 | Stop reason: HOSPADM

## 2017-11-20 RX ORDER — VALSARTAN 160 MG/1
320 TABLET ORAL DAILY
Status: DISCONTINUED | OUTPATIENT
Start: 2017-11-21 | End: 2017-11-24 | Stop reason: HOSPADM

## 2017-11-20 RX ORDER — DILTIAZEM HYDROCHLORIDE 240 MG/1
240 CAPSULE, COATED, EXTENDED RELEASE ORAL DAILY
Status: DISCONTINUED | OUTPATIENT
Start: 2017-11-21 | End: 2017-11-24 | Stop reason: HOSPADM

## 2017-11-20 RX ORDER — IBUPROFEN 200 MG
24 TABLET ORAL
Status: DISCONTINUED | OUTPATIENT
Start: 2017-11-20 | End: 2017-11-24 | Stop reason: HOSPADM

## 2017-11-20 RX ORDER — ASPIRIN 81 MG/1
81 TABLET ORAL DAILY
Status: DISCONTINUED | OUTPATIENT
Start: 2017-11-21 | End: 2017-11-24 | Stop reason: HOSPADM

## 2017-11-20 RX ORDER — IBUPROFEN 200 MG
16 TABLET ORAL
Status: DISCONTINUED | OUTPATIENT
Start: 2017-11-20 | End: 2017-11-24 | Stop reason: HOSPADM

## 2017-11-20 RX ORDER — ATENOLOL 50 MG/1
50 TABLET ORAL DAILY
Status: DISCONTINUED | OUTPATIENT
Start: 2017-11-21 | End: 2017-11-24 | Stop reason: HOSPADM

## 2017-11-20 RX ORDER — CHOLECALCIFEROL (VITAMIN D3) 25 MCG
5000 TABLET ORAL DAILY
Status: DISCONTINUED | OUTPATIENT
Start: 2017-11-21 | End: 2017-11-24 | Stop reason: HOSPADM

## 2017-11-20 RX ORDER — CALCIUM CARBONATE 500(1250)
1000 TABLET ORAL ONCE
Status: COMPLETED | OUTPATIENT
Start: 2017-11-20 | End: 2017-11-20

## 2017-11-20 RX ORDER — SODIUM CHLORIDE 0.9 % (FLUSH) 0.9 %
5 SYRINGE (ML) INJECTION
Status: DISCONTINUED | OUTPATIENT
Start: 2017-11-20 | End: 2017-11-24 | Stop reason: HOSPADM

## 2017-11-20 RX ORDER — LEVETIRACETAM 500 MG/1
500 TABLET ORAL 2 TIMES DAILY
Status: DISCONTINUED | OUTPATIENT
Start: 2017-11-20 | End: 2017-11-24 | Stop reason: HOSPADM

## 2017-11-20 RX ORDER — ONDANSETRON 2 MG/ML
4 INJECTION INTRAMUSCULAR; INTRAVENOUS EVERY 8 HOURS PRN
Status: DISCONTINUED | OUTPATIENT
Start: 2017-11-20 | End: 2017-11-24 | Stop reason: HOSPADM

## 2017-11-20 RX ORDER — GLUCAGON 1 MG
1 KIT INJECTION
Status: DISCONTINUED | OUTPATIENT
Start: 2017-11-20 | End: 2017-11-24 | Stop reason: HOSPADM

## 2017-11-20 RX ORDER — SULFAMETHOXAZOLE AND TRIMETHOPRIM 800; 160 MG/1; MG/1
1 TABLET ORAL
Status: DISCONTINUED | OUTPATIENT
Start: 2017-11-22 | End: 2017-11-24 | Stop reason: HOSPADM

## 2017-11-20 RX ORDER — PREDNISONE 10 MG/1
10 TABLET ORAL DAILY
Status: DISCONTINUED | OUTPATIENT
Start: 2017-11-21 | End: 2017-11-24 | Stop reason: HOSPADM

## 2017-11-20 RX ORDER — INSULIN ASPART 100 [IU]/ML
10 INJECTION, SOLUTION INTRAVENOUS; SUBCUTANEOUS
Status: DISCONTINUED | OUTPATIENT
Start: 2017-11-21 | End: 2017-11-24 | Stop reason: HOSPADM

## 2017-11-20 RX ADMIN — LEVETIRACETAM 500 MG: 500 TABLET ORAL at 09:11

## 2017-11-20 RX ADMIN — INSULIN DETEMIR 20 UNITS: 100 INJECTION, SOLUTION SUBCUTANEOUS at 09:11

## 2017-11-20 RX ADMIN — CALCIUM 1000 MG: 500 TABLET ORAL at 09:11

## 2017-11-20 NOTE — ASSESSMENT & PLAN NOTE
57 y.o. male with CNS vasculitis and MS with L4 fracture s/p fall last week  -Patient neurologically stable on exam  -No acute neurosurgical intervention  -Recommend LSO brace and standing Xrays on the lumbar spine in the brace  -If xrays are stable, will plan to see the patient in Dr. Jaffe's clinic in 4 weeks with repeat Xrays.   -q4h neuro checks  -Imaging reviewed and plan discussed with Dr. Jaffe.

## 2017-11-20 NOTE — HPI
Bessy Nunez is a 57 y.o. male with history of CNS vasculitis, MS on HD CTX, CHASE, DMII, lacunar stroke 9/2017 and HTN who presented to the ED referred by his neurologist for evaluation of L4 fracture. The patient has baseline gait instability and fell backwards one week ago when walking into his house. He struck his back on a brick ledge which caused severe localized low back pain. He went to the ED that night where xrays were reportedly negative. He saw his neurologist today who ordered an MRI and sent the patient for neurosurgical evaluation. He denies radicular leg pain, new numbness or paresthesias. He has BLE neuropathy 2/2 his uncontrolled diabetes. He has intermittent incontinence per his wife. She reports neither his gait or incontinence have worsened since the fall.

## 2017-11-20 NOTE — TELEPHONE ENCOUNTER
I recommend that pt's wife bring Mr. Nunez to ER at [facility] for evaluation. Needs infectious w/u and MRI brain with and without contrast

## 2017-11-20 NOTE — TELEPHONE ENCOUNTER
MRI today shows burst fracture at L4.   [facility] was completed b/c of symptoms of worsening weakness and gait disturbance.     Recommend:   Admit  Neurosurgery consultation to address L4 fracture  Neurology consultation as pt has CNS vasculitis and is on monthly pulse Cytoxan

## 2017-11-20 NOTE — ASSESSMENT & PLAN NOTE
Mr. Nunez is a 57 year old man with CNS vasculitis, managed by Dr. Love and on monthly cyclophosphamide. He is admitted for a lumbar burst fracture after a fall. According to the patient and his wife he has been slowly improving with treatment with cytoxan. Neither are concerned for any active issues regarding this.    Recommendations:  -- No additional workup from out perspective  -- Continue cyclophosphamide as previously scheduled  -- Management of lumbar fracture per Neurosurgery

## 2017-11-20 NOTE — CONSULTS
[facility]  Neurosurgery  Consult Note    Consults  Subjective:         History of Present Illness: Bessy Nunez is a 57 y.o. male with history of CNS vasculitis, MS on HD CTX, CHASE, DMII, lacunar stroke 9/2017 and HTN who presented to the ED referred by his neurologist for evaluation of L4 fracture. The patient has baseline gait instability and fell backwards one week ago when walking into his house. He struck his back on a brick ledge which caused severe localized low back pain. He went to the ED that night where xrays were reportedly negative. He saw his neurologist today who ordered an MRI and sent the patient for neurosurgical evaluation. He denies radicular leg pain, new numbness or paresthesias. He has BLE neuropathy 2/2 his uncontrolled diabetes. He has intermittent incontinence per his wife. She reports neither his gait or incontinence have worsened since the fall.        (Not in a hospital admission)    Review of patient's allergies indicates:  No Known Allergies    Past Medical History:   Diagnosis Date    CNS vasculitis 6/2/2017    Follows with Dr. Love By mri.  Several active lesions, many old. 5/13: MRA brain/neck, MRI brain w/wo contrast show no vascular occlusion, multiple foci of hyperintensity in deep cerebral periventricular white matter in pattern of demyelinating process.  5/17: MRI spine performed, no spinal cord lesions. Hospitalization 6/2017. EEG was performed negative for seizures.  Repeat MRI showing new lesion, question whether this was demyelination versus CVA. 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 Findings compatible with a small acute lacunar infarct adjacent to the left frontal horn.  Nonspecific enhancement just inferior to this region and  extending into the left basal ganglia, as well as within the inferior aspect of the left cerebellum.  No evidence of a focal mass. 2.  Extensive increased signal intensity involving the periventricular white matter compatible with demyelinating disease versus vasculitis. 3.  Clinical correlation and followup recommended. 4.  This reportedly flattened in the EPIC and the corpus callosum medical record system.    Mixed hyperlipidemia 11/9/2012    NAFLD (nonalcoholic fatty liver disease) 10/11/2013    CHASE (nonalcoholic steatohepatitis)     Obesity     Stroke     Type 2 diabetes mellitus with diabetic polyneuropathy, with long-term current use of insulin 5/14/2017    Type 2 diabetes mellitus with hyperglycemia, with long-term current use of insulin 10/11/2013     Past Surgical History:   Procedure Laterality Date    SKIN CANCER EXCISION       Family History     Problem Relation (Age of Onset)    Cancer Father    Diabetes Maternal Aunt    Heart disease Mother    Heart failure Mother    Hypertension Mother        Social History Main Topics    Smoking status: Never Smoker    Smokeless tobacco: Never Used    Alcohol use No    Drug use: No    Sexual activity: Not on file     Review of Systems   Constitutional: Negative for activity change, diaphoresis, fatigue, fever and unexpected weight change.   Eyes: Negative for visual disturbance.   Respiratory: Negative for cough, shortness of breath and wheezing.    Cardiovascular: Negative for chest pain and palpitations.   Gastrointestinal: Negative for abdominal distention, abdominal pain, blood in stool, constipation, diarrhea, nausea and vomiting.   Genitourinary: Negative for difficulty urinating, enuresis, frequency and urgency.   Musculoskeletal: Positive for back pain. Negative for gait problem, joint swelling, myalgias, neck pain and neck stiffness.   Skin: Negative for color change, rash and wound.   Neurological: Positive for weakness. Negative for  dizziness, seizures, facial asymmetry, speech difficulty, light-headedness, numbness and headaches.   Hematological: Does not bruise/bleed easily.   Psychiatric/Behavioral: Negative for agitation, behavioral problems, dysphoric mood and hallucinations. The patient is not nervous/anxious.      Objective:     Weight: 115.7 kg (255 lb)  Body mass index is 35.57 kg/m².  Vital Signs (Most Recent):  Temp: 98.8 °F (37.1 °C) (11/20/17 1343)  Pulse: 85 (11/20/17 1451)  Resp: 14 (11/20/17 1451)  BP: 127/67 (11/20/17 1451)  SpO2: (!) 94 % (11/20/17 1451) Vital Signs (24h Range):  Temp:  [98.8 °F (37.1 °C)] 98.8 °F (37.1 °C)  Pulse:  [85] 85  Resp:  [14-16] 14  SpO2:  [94 %-98 %] 94 %  BP: (127)/(67-74) 127/67          Neurosurgery Physical Exam  General: well developed, well nourished, no distress.   Head: normocephalic, atraumatic  Neurologic: Alert and oriented. Mild confusion   GCS: Motor: 6/Verbal: 5/Eyes: 4 GCS Total: 15  Mental Status: Awake, Alert, Oriented x3  Cranial nerves: face symmetric, tongue midline, CN II-XII grossly intact.   Eyes: pupils equal, round, reactive to light with accomodation, EOMI.   Sensory: intact to light touch throughout  Motor Strength: Moves all extremities spontaneously with good tone.  Full strength upper and lower extremities.     Strength  Deltoids Triceps Biceps Wrist Extension Wrist Flexion Hand    Upper: R 5/5 5/5 5/5 5/5 5/5 5/5    L 5/5 5/5 5/5 5/5 5/5 5/5     Iliopsoas Quadriceps Knee  Flexion Tibialis  anterior Gastro- cnemius EHL   Lower: R 4/5 5/5 5/5 5/5 5/5 5/5    L 5/5 5/5 5/5 5/5 5/5 5/5     DTR's - 2 + and symmetric in UE and LE  Pronator Drift: no drift noted  Finger-to-nose: Intact bilaterally  Agarwal: absent  Clonus: absent  Babinski: absent  Pulses: 2+ and symmetric radial and dorsalis pedis. No lower extremity edema      Significant Labs:    Recent Labs  Lab 11/20/17  1502   *      K 4.1      CO2 22*   BUN 27*   CREATININE 0.9   CALCIUM 9.0        Recent Labs  Lab 11/20/17  1502   WBC 7.66   HGB 9.4*   HCT 26.7*          Recent Labs  Lab 11/20/17  1502   INR 1.0   APTT 21.5     Microbiology Results (last 7 days)     ** No results found for the last 168 hours. **        All pertinent labs from the last 24 hours have been reviewed.    Significant Diagnostics:  MRI lumbar spine 11/20 reviewed. Acute burst type fracture of the L4 vertebra with fracture line extending to the posterior cortex of the L4 vertebra.  There is loss of central vertebral body height of the superior end plate of approximately 40%. No retropulsion.     Assessment/Plan:     L4 vertebral fracture    57 y.o. male with CNS vasculitis and MS with L4 fracture s/p fall last week  -Patient neurologically stable on exam  -No acute neurosurgical intervention  -Recommend LSO brace and standing Xrays on the lumbar spine in the brace  -If xrays are stable, will plan to see the patient in Dr. Jaffe's clinic in 4 weeks with repeat Xrays.   -q4h neuro checks  -Imaging reviewed and plan discussed with Dr. Jaffe.            Linda Grey PA-C  Neurosurgery  [facility]

## 2017-11-20 NOTE — ED PROVIDER NOTES
Encounter Date: 11/20/2017    SCRIBE #1 NOTE: I, Zita Soliz, am scribing for, and in the presence of,  Dr. Traylor. I have scribed the following portions of the note - the Resident attestation.       History     Chief Complaint   Patient presents with    Fall     Pt having frequent falls.  Pt had MRI today, told to come to ED for evaluation.  Pt having weakness.       57-year-old male with known CNS vasculitis (diagnosed 5/2017, on monthly cyclophosphamide pulse therapy) who presented to the ED after being advised by his neurologist due to abnormal lumbar spine MRI. He reports gait instability, intermittent urine and stool incontinence, and LE weakness even before the fall.  Patient reports having frequent falls since he was diagnosed with vasculitis. He presented to the ED on 11/14 due to a fall where he hit his back against a brick wall. X-rays at that time didn't show a significant fracture and he was discharged. However, upon re-evaluation, a possible L4 fracture was identified. Patient didn't have an acute change in his CNS vasculitis neurological symptoms. Evaluation was further delayed secondary to insurance issues. Today, he had an MRI spine done, which showed acute burst type fracture of the L4. He was advised to come to the ED for neurosurgical evaluation and further work up of his vasculitis. He reports having back pain since his fall, and he had another fall in the bathtub on Thursday 11/16. His last cyclophosphamide therapy was on 11/17.      The history is provided by the patient, the spouse and medical records.     Review of patient's allergies indicates:  No Known Allergies  Past Medical History:   Diagnosis Date    CNS vasculitis 6/2/2017    Follows with Dr. Love By mri.  Several active lesions, many old. 5/13: MRA brain/neck, MRI brain w/wo contrast show no vascular occlusion, multiple foci of hyperintensity in deep cerebral periventricular white matter in pattern of demyelinating process.   5/17: MRI spine performed, no spinal cord lesions. Hospitalization 6/2017. EEG was performed negative for seizures.  Repeat MRI showing new lesion, question whether this was demyelination versus CVA. 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 Findings compatible with a small acute lacunar infarct adjacent to the left frontal horn.  Nonspecific enhancement just inferior to this region and extending into the left basal ganglia, as well as within the inferior aspect of the left cerebellum.  No evidence of a focal mass. 2.  Extensive increased signal intensity involving the periventricular white matter compatible with demyelinating disease versus vasculitis. 3.  Clinical correlation and followup recommended. 4.  This reportedly flattened in the EPIC and the corpus callosum medical record system.    Mixed hyperlipidemia 11/9/2012    NAFLD (nonalcoholic fatty liver disease) 10/11/2013    CHASE (nonalcoholic steatohepatitis)     Obesity     Stroke     Type 2 diabetes mellitus with diabetic polyneuropathy, with long-term current use of insulin 5/14/2017    Type 2 diabetes mellitus with hyperglycemia, with long-term current use of insulin 10/11/2013     Past Surgical History:   Procedure Laterality Date    SKIN CANCER EXCISION       Family History   Problem Relation Age of Onset    Heart disease Mother     Hypertension Mother     Heart failure Mother     Cancer Father      lung    Diabetes Maternal Aunt      Social History   Substance Use Topics    Smoking status: Never Smoker    Smokeless tobacco: Never Used    Alcohol use No     Review of Systems   Constitutional: Negative for chills and fever.   Eyes: Negative for visual disturbance.   Respiratory: Negative for cough and shortness of breath.     Cardiovascular: Negative for chest pain and leg swelling.   Gastrointestinal: Negative for abdominal pain, nausea and vomiting.   Genitourinary: Negative for dysuria and hematuria.   Musculoskeletal: Positive for back pain and gait problem.   Skin: Negative for color change.   Neurological: Negative for dizziness and syncope.   Psychiatric/Behavioral: Positive for confusion.       Physical Exam     Initial Vitals [11/20/17 1343]   BP Pulse Resp Temp SpO2   127/74 85 16 98.8 °F (37.1 °C) 98 %      MAP       91.67         Physical Exam    Constitutional: He appears well-developed and well-nourished. He is not diaphoretic. No distress.   HENT:   Head: Normocephalic and atraumatic.   Eyes: EOM are normal. Pupils are equal, round, and reactive to light.   Neck: Neck supple.   Cardiovascular: Normal rate, regular rhythm and normal heart sounds.   Pulmonary/Chest: Breath sounds normal. He has no wheezes. He has no rales.   Abdominal: Soft. Bowel sounds are normal. He exhibits no distension. There is no tenderness.   Musculoskeletal: He exhibits no edema.   Neurological: He is alert and oriented to person, place, and time. No sensory deficit. He exhibits abnormal muscle tone (Increased in LLE).   Power is 5/5 in BUE, 5/5 in RLE, and 4/5 in LLE (baseline, per patient)  Point tenderness on mid-thoracic spine and lower lumbar spine   Skin: Skin is warm and dry.   Psychiatric: He has a normal mood and affect. Thought content normal.         ED Course   Procedures  Labs Reviewed   COMPREHENSIVE METABOLIC PANEL - Abnormal; Notable for the following:        Result Value    CO2 22 (*)     Glucose 208 (*)     BUN, Bld 27 (*)     Albumin 3.3 (*)     All other components within normal limits   CBC W/ AUTO DIFFERENTIAL - Abnormal; Notable for the following:     RBC 3.14 (*)     Hemoglobin 9.4 (*)     Hematocrit 26.7 (*)     RDW 15.9 (*)     Lymph # 0.6 (*)     Gran% 83.0 (*)     Lymph% 8.4 (*)     All other components within normal  limits   HEMOGLOBIN A1C - Abnormal; Notable for the following:     Hemoglobin A1C 6.7 (*)     Estimated Avg Glucose 146 (*)     All other components within normal limits    Narrative:     ADD ON [facility] PER DR. HOMER DE LA TORRE AT  11/20/2017  16:55 (USED   LAVX)   APTT   PROTIME-INR   HEMOGLOBIN A1C   URINALYSIS, REFLEX TO URINE CULTURE             Medical Decision Making:   History:   Old Medical Records: I decided to obtain old medical records.  Old Records Summarized: records from previous admission(s) and records from clinic visits.  Initial Assessment:   57-year-old male with CNS vasculitis who presented due to L4 fracture found on MRI spine done today. He has been having worening of his neurological status over the past few months, which resulted in his recent fall causing his L4 fracture.  Differential Diagnosis:   Fracture due to trauma vs infectious cause.  Clinical Tests:   Lab Tests: Ordered and Reviewed  Radiological Study: Reviewed  ED Management:  Basic labs ordered in anticipation for possible neurosurgical intervention.  Discussed with neurosurgery and neurology.  Patient will be admitted to Hospital Medicine service with neurology and neurosurgery follow up.  Other:   I have discussed this case with another health care provider.            Scribe Attestation:   Scribe #1: I performed the above scribed service and the documentation accurately describes the services I performed. I attest to the accuracy of the note.    Attending Attestation:   Physician Attestation Statement for Resident:  As the supervising MD   Physician Attestation Statement: I have personally seen and examined this patient.   I agree with the above history. -: 56 yo man with hx of CNS vasculitis who presents with his wife. They state that over the past several weeks he has had increasing fatigue and feeling off balance. He has been falling. Fell a few days ago and was seen in an outside [facility] ED where he was found to have an L4  vertebral fracture. He had an MRI today which showed that this is a burst type fracture. His neurologist recommended he be admitted for further evaluation because of his increasing fatigue and frequent falls Pt states his pain in his back is well controlled until he tries to move around.   As the supervising MD I agree with the above PE.   -: Heart: RRR. Lungs: CTAB.    As the supervising MD I agree with the above treatment, course, plan, and disposition.   -: 58 yo male with vasculitis and frequent falls now had a fall significant enough to cause a L4 burst fracture. Neurosurgery consulted for this. He will require admission for further management.   I have reviewed and agree with the residents interpretation of the following: lab data.  I have reviewed the following: old records at this facility.                    ED Course      Clinical Impression:   The primary encounter diagnosis was Closed compression fracture of fourth lumbar vertebra, initial encounter. A diagnosis of CNS vasculitis was also pertinent to this visit.                           Tal Klein MD  Resident  11/20/17 [id number]

## 2017-11-20 NOTE — SUBJECTIVE & OBJECTIVE
(Not in a hospital admission)    Review of patient's allergies indicates:  No Known Allergies    Past Medical History:   Diagnosis Date    CNS vasculitis 6/2/2017    Follows with Dr. Love By mri.  Several active lesions, many old. 5/13: MRA brain/neck, MRI brain w/wo contrast show no vascular occlusion, multiple foci of hyperintensity in deep cerebral periventricular white matter in pattern of demyelinating process.  5/17: MRI spine performed, no spinal cord lesions. Hospitalization 6/2017. EEG was performed negative for seizures.  Repeat MRI showing new lesion, question whether this was demyelination versus CVA. 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 Findings compatible with a small acute lacunar infarct adjacent to the left frontal horn.  Nonspecific enhancement just inferior to this region and extending into the left basal ganglia, as well as within the inferior aspect of the left cerebellum.  No evidence of a focal mass. 2.  Extensive increased signal intensity involving the periventricular white matter compatible with demyelinating disease versus vasculitis. 3.  Clinical correlation and followup recommended. 4.  This reportedly flattened in the EPIC and the corpus callosum medical record system.    Mixed hyperlipidemia 11/9/2012    NAFLD (nonalcoholic fatty liver disease) 10/11/2013    CHASE (nonalcoholic steatohepatitis)     Obesity     Stroke     Type 2 diabetes mellitus with diabetic polyneuropathy, with long-term current use of insulin 5/14/2017    Type 2 diabetes mellitus with hyperglycemia, with long-term current use of insulin 10/11/2013     Past Surgical History:   Procedure Laterality Date    SKIN CANCER EXCISION       Family History     Problem Relation (Age of Onset)     Cancer Father    Diabetes Maternal Aunt    Heart disease Mother    Heart failure Mother    Hypertension Mother        Social History Main Topics    Smoking status: Never Smoker    Smokeless tobacco: Never Used    Alcohol use No    Drug use: No    Sexual activity: Not on file     Review of Systems   Constitutional: Negative for activity change, diaphoresis, fatigue, fever and unexpected weight change.   Eyes: Negative for visual disturbance.   Respiratory: Negative for cough, shortness of breath and wheezing.    Cardiovascular: Negative for chest pain and palpitations.   Gastrointestinal: Negative for abdominal distention, abdominal pain, blood in stool, constipation, diarrhea, nausea and vomiting.   Genitourinary: Negative for difficulty urinating, enuresis, frequency and urgency.   Musculoskeletal: Positive for back pain. Negative for gait problem, joint swelling, myalgias, neck pain and neck stiffness.   Skin: Negative for color change, rash and wound.   Neurological: Positive for weakness. Negative for dizziness, seizures, facial asymmetry, speech difficulty, light-headedness, numbness and headaches.   Hematological: Does not bruise/bleed easily.   Psychiatric/Behavioral: Negative for agitation, behavioral problems, dysphoric mood and hallucinations. The patient is not nervous/anxious.      Objective:     Weight: 115.7 kg (255 lb)  Body mass index is 35.57 kg/m².  Vital Signs (Most Recent):  Temp: 98.8 °F (37.1 °C) (11/20/17 1343)  Pulse: 85 (11/20/17 1451)  Resp: 14 (11/20/17 1451)  BP: 127/67 (11/20/17 1451)  SpO2: (!) 94 % (11/20/17 1451) Vital Signs (24h Range):  Temp:  [98.8 °F (37.1 °C)] 98.8 °F (37.1 °C)  Pulse:  [85] 85  Resp:  [14-16] 14  SpO2:  [94 %-98 %] 94 %  BP: (127)/(67-74) 127/67          Neurosurgery Physical Exam  General: well developed, well nourished, no distress.   Head: normocephalic, atraumatic  Neurologic: Alert and oriented. Mild confusion   GCS: Motor: 6/Verbal: 5/Eyes: 4 GCS  Total: 15  Mental Status: Awake, Alert, Oriented x3  Cranial nerves: face symmetric, tongue midline, CN II-XII grossly intact.   Eyes: pupils equal, round, reactive to light with accomodation, EOMI.   Sensory: intact to light touch throughout  Motor Strength: Moves all extremities spontaneously with good tone.  Full strength upper and lower extremities.     Strength  Deltoids Triceps Biceps Wrist Extension Wrist Flexion Hand    Upper: R 5/5 5/5 5/5 5/5 5/5 5/5    L 5/5 5/5 5/5 5/5 5/5 5/5     Iliopsoas Quadriceps Knee  Flexion Tibialis  anterior Gastro- cnemius EHL   Lower: R 4/5 5/5 5/5 5/5 5/5 5/5    L 5/5 5/5 5/5 5/5 5/5 5/5     DTR's - 2 + and symmetric in UE and LE  Pronator Drift: no drift noted  Finger-to-nose: Intact bilaterally  Agarwal: absent  Clonus: absent  Babinski: absent  Pulses: 2+ and symmetric radial and dorsalis pedis. No lower extremity edema      Significant Labs:    Recent Labs  Lab 11/20/17  1502   *      K 4.1      CO2 22*   BUN 27*   CREATININE 0.9   CALCIUM 9.0       Recent Labs  Lab 11/20/17  1502   WBC 7.66   HGB 9.4*   HCT 26.7*          Recent Labs  Lab 11/20/17  1502   INR 1.0   APTT 21.5     Microbiology Results (last 7 days)     ** No results found for the last 168 hours. **        All pertinent labs from the last 24 hours have been reviewed.    Significant Diagnostics:  MRI lumbar spine 11/20 reviewed. Acute burst type fracture of the L4 vertebra with fracture line extending to the posterior cortex of the L4 vertebra.  There is loss of central vertebral body height of the superior end plate of approximately 40%. No retropulsion.

## 2017-11-20 NOTE — H&P
[facility] Medicine  History & Physical    Patient Name: Bessy Nunez  MRN: [id number]  Admission Date: 11/20/2017  Attending Physician: Anthony Coffey MD  Primary Care Provider: Edgar Nova MD    [facility] Medicine Team: [facility] MED B Anthony Coffey MD     Patient information was obtained from patient, past medical records and ER records.     Subjective:     Principal Problem:Closed compression fracture of L4 lumbar vertebra    Chief Complaint:   Chief Complaint   Patient presents with    Fall     Pt having frequent falls.  Pt had MRI today, told to come to ED for evaluation.  Pt having weakness.          HPI: Bessy Nunez is a 57 y.o. male with a PMH of HTN, HLD, DM II, CNS vasculitis-not currently on therapy, discharged in June 2017 for MS vs cardio-embolic vs watershade stroke who presented to the ED on 11/20/2017 diagnosed with  Fall (Pt having frequent falls.  Pt had MRI today, told to come to ED for evaluation.  Pt having weakness.  )  Patient  presented to the ED on 11/14 due to a fall when he missed a step  where he hit his back against a brick wall. denies head trauma or loss of consciousness.  He came to ER due to back pain - reported at 8/10 on sitting and standing, nonradiating and  X-rays at that time didn't show a significant fracture and he was discharged.He reports gait instability for about 2 weeks , isubjectiev left LE weakness even before the fall. wife reports difficulty ambulating with back pain and on last saturday he could not get himself up from the bathtub; she had to call neighbor for help . wife called his neurologist Dr. love who ordered MRI spine Patient presented to the ED after being advised by Dr. Love -  neurologist due to abnormal lumbar spine MRI 11/20 - Acute burst type fracture of the L4 vertebra with fracture line extending to the posterior cortex of the L4 vertebra. He was advised to come to the ED for neurosurgical evaluation and  further work up. denies constipation, urinary renetion, groin numbness.  He  was on cyclophosphamide therapy for vasculitis monthely, last dose was on 11/17.    Lives in [place]. ambulates with cane and assistance      Past Medical History:   Diagnosis Date    CNS vasculitis 6/2/2017    Follows with Dr. Love By mri.  Several active lesions, many old. 5/13: MRA brain/neck, MRI brain w/wo contrast show no vascular occlusion, multiple foci of hyperintensity in deep cerebral periventricular white matter in pattern of demyelinating process.  5/17: MRI spine performed, no spinal cord lesions. Hospitalization 6/2017. EEG was performed negative for seizures.  Repeat MRI showing new lesion, question whether this was demyelination versus CVA. 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 Findings compatible with a small acute lacunar infarct adjacent to the left frontal horn.  Nonspecific enhancement just inferior to this region and extending into the left basal ganglia, as well as within the inferior aspect of the left cerebellum.  No evidence of a focal mass. 2.  Extensive increased signal intensity involving the periventricular white matter compatible with demyelinating disease versus vasculitis. 3.  Clinical correlation and followup recommended. 4.  This reportedly flattened in the EPIC and the [place] medical record system.    Mixed hyperlipidemia 11/9/2012    NAFLD (nonalcoholic fatty liver disease) 10/11/2013    CHASE (nonalcoholic steatohepatitis)     Obesity     Stroke     Type 2 diabetes mellitus with diabetic polyneuropathy, with long-term current use of insulin 5/14/2017    Type 2 diabetes mellitus with hyperglycemia, with long-term current use of insulin 10/11/2013       Past Surgical  "History:   Procedure Laterality Date    SKIN CANCER EXCISION         Review of patient's allergies indicates:  No Known Allergies    No current facility-administered medications on file prior to encounter.      Current Outpatient Prescriptions on File Prior to Encounter   Medication Sig    aspirin (ECOTRIN) 81 MG EC tablet Take 81 mg by mouth once daily.    atenolol (TENORMIN) 50 MG tablet Take 1 tablet (50 mg total) by mouth once daily.    atorvastatin (LIPITOR) 40 MG tablet Take 1 tablet (40 mg total) by mouth once daily.    blood sugar diagnostic Strp To check BG 4 times daily, to use with insurance preferred meter    blood-glucose meter kit To check BG 4 times daily, one touch verio meter. Dx code e11.65    diltiaZEM (DILACOR XR) 240 MG CDCR Take 1 capsule (240 mg total) by mouth once daily.    insulin glargine (BASAGLAR KWIKPEN) 100 unit/mL (3 mL) InPn pen Inject 32 Units into the skin 2 (two) times daily. Once in the morning at 9 am and once in the evening at 9 pm.    insulin lispro (HUMALOG) 100 unit/mL injection Inject 20 units before meals    insulin needles, disposable, (BD ULTRA-FINE ANA PEN NEEDLES) 32 x 5/32 " Ndle Inject twice daily    insulin syringe-needle U-100 (INSULIN SYRINGE) 1/2 mL 30 gauge x 5/16 Syrg Use 3x/day    lancets Misc To check BG 4 times daily, one touch verio meter. Dx code e11.65    levetiracetam (KEPPRA) 500 MG Tab Take 1 tablet (500 mg total) by mouth 2 (two) times daily.    liraglutide 0.6 mg/0.1 mL, 18 mg/3 mL, subq PNIJ (VICTOZA 3-TOMASZ) 0.6 mg/0.1 mL (18 mg/3 mL) PnIj Inject 1.8 mg into the skin once daily.    metformin (GLUCOPHAGE-XR) 500 MG 24 hr tablet Take 2 tablets (1,000 mg total) by mouth 2 (two) times daily with meals.    omega-3 fatty acids-vitamin E (FISH OIL) 1,000 mg Cap Take 1 capsule by mouth 2 (two) times daily.    ONETOUCH DELICA LANCETS 33 gauge Misc     predniSONE (DELTASONE) 10 MG tablet Take 1 tablet (10 mg total) by mouth once daily.    " ranitidine (ZANTAC) 150 MG tablet Take 1 tablet (150 mg total) by mouth 2 (two) times daily.    sertraline (ZOLOFT) 100 MG tablet 1 and half tablet daily (Patient taking differently: Take 150 mg by mouth once daily. )    sulfamethoxazole-trimethoprim 800-160mg (BACTRIM DS) 800-160 mg Tab Take every Monday, Wednesday, and Friday.  Infectious Disease will decide if this is needed in 3-4 months.    valsartan (DIOVAN) 320 MG tablet Take 1 tablet (320 mg total) by mouth once daily.    vitamin D 1000 units Tab Take 5 tablets (5,000 Units total) by mouth once daily.    calcium carbonate (OS-DONNA) 500 mg calcium (1,250 mg) tablet Take 2 tablets (1,000 mg total) by mouth once.     Family History     Problem Relation (Age of Onset)    Cancer Father    Diabetes Maternal Aunt    Heart disease Mother    Heart failure Mother    Hypertension Mother        Social History Main Topics    Smoking status: Never Smoker    Smokeless tobacco: Never Used    Alcohol use No    Drug use: No    Sexual activity: Not on file     Review of Systems   Constitutional: Positive for activity change. Negative for appetite change, chills, diaphoresis, fatigue and unexpected weight change.   HENT: Negative for congestion, ear discharge, ear pain, facial swelling and sneezing.    Eyes: Negative for pain, discharge, redness and itching.   Respiratory: Negative for apnea, cough, choking, chest tightness, shortness of breath and wheezing.    Cardiovascular: Negative for chest pain, palpitations and leg swelling.   Gastrointestinal: Negative for abdominal distention, abdominal pain, blood in stool, constipation, diarrhea, nausea and vomiting.   Endocrine: Negative for cold intolerance and heat intolerance.   Genitourinary: Negative for difficulty urinating, dysuria, flank pain, hematuria and testicular pain.   Musculoskeletal: Negative for arthralgias, back pain, gait problem, joint swelling, myalgias and neck pain.   Skin: Negative for color change,  pallor and rash.   Allergic/Immunologic: Negative for environmental allergies.   Neurological: Negative for dizziness, seizures, syncope, facial asymmetry, weakness and headaches.   Hematological: Negative for adenopathy.   Psychiatric/Behavioral: Negative for agitation.     Objective:     Vital Signs (Most Recent):  Temp: 98.8 °F (37.1 °C) (11/20/17 1343)  Pulse: 85 (11/20/17 1451)  Resp: 14 (11/20/17 1451)  BP: 127/67 (11/20/17 1451)  SpO2: (!) 94 % (11/20/17 1451) Vital Signs (24h Range):  Temp:  [98.8 °F (37.1 °C)] 98.8 °F (37.1 °C)  Pulse:  [85] 85  Resp:  [14-16] 14  SpO2:  [94 %-98 %] 94 %  BP: (127)/(67-74) 127/67     Weight: 115.7 kg (255 lb)  Body mass index is 35.57 kg/m².    Physical Exam   Constitutional: He appears well-developed and well-nourished.   obesity   HENT:   Head: Normocephalic and atraumatic.   Mouth/Throat: Oropharynx is clear and moist. No oropharyngeal exudate.   Eyes: Conjunctivae and EOM are normal. Pupils are equal, round, and reactive to light. Right eye exhibits no discharge. Left eye exhibits no discharge. No scleral icterus.   Neck: Normal range of motion. Neck supple. No JVD present. No tracheal deviation present. No thyromegaly present.   Cardiovascular: Normal rate, regular rhythm and normal heart sounds.  Exam reveals no friction rub.    No murmur heard.  Pulmonary/Chest: Effort normal and breath sounds normal. No respiratory distress. He has no wheezes. He has no rales.   Abdominal: Soft. Bowel sounds are normal. He exhibits distension. He exhibits no mass. There is no tenderness. There is no rebound and no guarding.   Musculoskeletal: Normal range of motion. He exhibits no edema.   Power is 5/5 in BUE, 5/5 in RLE, and 5/5 in LLE (baseline, per patient)   tenderness  -  lower lumbar spine    Lymphadenopathy:     He has no cervical adenopathy.   Neurological:   AAOx 2. No focal neurological deficits   Skin: Skin is warm and dry.   Psychiatric: He has a normal mood and affect.    Nursing note and vitals reviewed.      Significant Labs:   A1C:   Recent Labs  Lab 09/15/17  0046   HGBA1C 10.8*     ABGs: No results for input(s): PH, PCO2, HCO3, POCSATURATED, BE, TOTALHB, COHB, METHB, O2HB, POCFIO2 in the last 48 hours.  Bilirubin:   Recent Labs  Lab 11/17/17  1022 11/20/17  1502   BILITOT 0.4 0.6     Blood Culture: No results for input(s): LABBLOO in the last 48 hours.  BMP:   Recent Labs  Lab 11/20/17  1502   *      K 4.1      CO2 22*   BUN 27*   CREATININE 0.9   CALCIUM 9.0     CBC:   Recent Labs  Lab 11/20/17  1502   WBC 7.66   HGB 9.4*   HCT 26.7*        CMP:   Recent Labs  Lab 11/20/17  1502      K 4.1      CO2 22*   *   BUN 27*   CREATININE 0.9   CALCIUM 9.0   PROT 6.4   ALBUMIN 3.3*   BILITOT 0.6   ALKPHOS 61   AST 16   ALT 17   ANIONGAP 11   EGFRNONAA >60.0     Cardiac Markers: No results for input(s): CKMB, MYOGLOBIN, BNP, TROPISTAT in the last 48 hours.  Coagulation:   Recent Labs  Lab 11/20/17  1502   INR 1.0   APTT 21.5     Lactic Acid: No results for input(s): LACTATE in the last 48 hours.  Lipase: No results for input(s): LIPASE in the last 48 hours.  Lipid Panel: No results for input(s): CHOL, HDL, LDLCALC, TRIG, CHOLHDL in the last 48 hours.  Magnesium: No results for input(s): MG in the last 48 hours.  POCT Glucose: No results for input(s): POCTGLUCOSE in the last 48 hours.  Prealbumin: No results for input(s): PREALBUMIN in the last 48 hours.  Respiratory Culture: No results for input(s): GSRESP, RESPIRATORYC in the last 48 hours.  Troponin: No results for input(s): TROPONINI in the last 48 hours.  TSH:   Recent Labs  Lab 09/11/17  1135   TSH 1.799     Urine Culture: No results for input(s): LABURIN in the last 48 hours.  Urine Studies: No results for input(s): COLORU, APPEARANCEUA, PHUR, SPECGRAV, PROTEINUA, GLUCUA, KETONESU, BILIRUBINUA, OCCULTUA, NITRITE, UROBILINOGEN, LEUKOCYTESUR, RBCUA, WBCUA, BACTERIA, SQUAMEPITHEL,  HYALINECASTS in the last 48 hours.    Invalid input(s): GUS  All pertinent labs within the past 24 hours have been reviewed.    Significant Imaging:  Imaging Results    None         Assessment/Plan:     Active Diagnoses:    Diagnosis Date Noted POA    PRINCIPAL PROBLEM:  Closed compression fracture of L4 lumbar vertebra [S32.040A] MRI lumbar spien - Acute burst type fracture of the L4 vertebra with fracture line extending to the posterior cortex of the L4 vertebra.  There is loss of central vertebral body height of the superior end plate of approximately 40%. neurosurgery evaluation  11/15/2017 Yes    Chemotherapy-induced neutropenia [D70.1, T45.1X5A] on cylcophosphamide monthly. WBC at 7.6 08/30/2017 Yes    History of recent fall [Z91.81]fall precuations 06/14/2017 Not Applicable    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]Cerebral angiogram 05/2017 c/w cerebral vasculitis vs multifocal atherosclerotic disease. Signs of demyelinating disease vs vasculitis on imaging. follows up with Dr. Love, on cyclophosphamide after Rheumatology evaluation. neurology evaluation   06/02/2017 Yes    Type 2 diabetes mellitus with diabetic polyneuropathy, with long-term current use of insulin [E11.42, Z79.4] on Detemir and aspart  05/14/2017 Not Applicable    NAFLD (nonalcoholic fatty liver disease) [K76.0]No active issues 10/11/2013 Yes    Obesity (BMI 30-39.9) [E66.9]  Body mass index is 35.57 kg/m². Morbid obesity complicates all aspects of disease management from diagnostic modalities to treatment. Weight loss encouraged  10/11/2013 Yes    JOHN (obstructive sleep apnea) [G47.33] 10/11/2013 Yes    Essential hypertension [I10]Blood pressure controlled on atenolol and diltiazem  11/09/2012 Yes    Mixed hyperlipidemia [E78.2]Lipitor  Depression - on zoloft  11/09/2012 Yes      Problems Resolved During this Admission:    Diagnosis Date Noted Date Resolved POA     VTE Risk Mitigation     None            Anthony Coffey MD  Department  [facility] Medicine   [facility]

## 2017-11-20 NOTE — CONSULTS
"[facility]  Neurology  Consult Note    Patient Name: Bessy Nunez  MRN: [id number]  Admission Date: 11/20/2017  Hospital Length of Stay: 0 days  Code Status: Prior   Attending Provider: Priyanka Canchola MD   Consulting Provider: Rivas Langley MD  Primary Care Physician: Edgar oNva MD  Principal Problem:Closed compression fracture of L4 lumbar vertebra    Inpatient consult to neurology  Consult performed by: RIVAS LANGLEY  Consult ordered by: AUGUSTIN DONALDSON         Subjective:     Chief Complaint:  Lumbar fracture     HPI:   Mr. Nunez is a 57 year old man with a history of CNS vasculitis diagnosed in May 2017, followed by Dr. Love on monthly cyclophosphamide. He presented to the ED today after getting an MRI of his lumbar spine which showed an L4 burst fracture. He had initially presented to the ED on 11/14/17 after a fall from tripping. It appears as though he was discharged, then radiology called the ordering physician the next day notifying him of the fracture (difficult to fully determine timeline). The ED physician attempted to get him an appointment at [facility] (limited by medicaid status), [facility] (don't see spine) and [facility] (no answer). The patient's wife got in contact with Dr. Love's office who ordered the MRI.    He was last seen by outpatient Neurology for CNS vasculitis on 10/20/17 and was noted to be improving though his "cognitive performance is about 30% of normal" according to his wife. His last dose of cytoxan was 11/17/17. At that time he was alert, oriented (person, place and month) and aware of the upcoming holiday (Halloween).      Past Medical History:   Diagnosis Date    CNS vasculitis 6/2/2017    Follows with Dr. Love By mri.  Several active lesions, many old. 5/13: MRA brain/neck, MRI brain w/wo contrast show no vascular occlusion, multiple foci of hyperintensity in deep cerebral periventricular white matter in pattern of " demyelinating process.  5/17: MRI spine performed, no spinal cord lesions. Hospitalization 6/2017. EEG was performed negative for seizures.  Repeat MRI showing new lesion, question whether this was demyelination versus CVA. 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 Findings compatible with a small acute lacunar infarct adjacent to the left frontal horn.  Nonspecific enhancement just inferior to this region and extending into the left basal ganglia, as well as within the inferior aspect of the left cerebellum.  No evidence of a focal mass. 2.  Extensive increased signal intensity involving the periventricular white matter compatible with demyelinating disease versus vasculitis. 3.  Clinical correlation and followup recommended. 4.  This reportedly flattened in the EPIC and the corpus callosum medical record system.    Mixed hyperlipidemia 11/9/2012    NAFLD (nonalcoholic fatty liver disease) 10/11/2013    CHASE (nonalcoholic steatohepatitis)     Obesity     Stroke     Type 2 diabetes mellitus with diabetic polyneuropathy, with long-term current use of insulin 5/14/2017    Type 2 diabetes mellitus with hyperglycemia, with long-term current use of insulin 10/11/2013       Past Surgical History:   Procedure Laterality Date    SKIN CANCER EXCISION         Review of patient's allergies indicates:  No Known Allergies    Current Neurological Medications: None    No current facility-administered medications on file prior to encounter.      Current Outpatient Prescriptions on File Prior to Encounter   Medication Sig    aspirin (ECOTRIN) 81 MG EC tablet Take 81 mg by mouth once daily.    atenolol (TENORMIN) 50 MG tablet Take 1 tablet (50 mg total) by mouth once daily.    atorvastatin (LIPITOR) 40 MG  "tablet Take 1 tablet (40 mg total) by mouth once daily.    blood sugar diagnostic Strp To check BG 4 times daily, to use with insurance preferred meter    blood-glucose meter kit To check BG 4 times daily, one touch verio meter. Dx code e11.65    diltiaZEM (DILACOR XR) 240 MG CDCR Take 1 capsule (240 mg total) by mouth once daily.    insulin glargine (BASAGLAR KWIKPEN) 100 unit/mL (3 mL) InPn pen Inject 32 Units into the skin 2 (two) times daily. Once in the morning at 9 am and once in the evening at 9 pm.    insulin lispro (HUMALOG) 100 unit/mL injection Inject 20 units before meals    insulin needles, disposable, (BD ULTRA-FINE ANA PEN NEEDLES) 32 x 5/32 " Ndle Inject twice daily    insulin syringe-needle U-100 (INSULIN SYRINGE) 1/2 mL 30 gauge x 5/16 Syrg Use 3x/day    lancets Misc To check BG 4 times daily, one touch verio meter. Dx code e11.65    levetiracetam (KEPPRA) 500 MG Tab Take 1 tablet (500 mg total) by mouth 2 (two) times daily.    liraglutide 0.6 mg/0.1 mL, 18 mg/3 mL, subq PNIJ (VICTOZA 3-TOMASZ) 0.6 mg/0.1 mL (18 mg/3 mL) PnIj Inject 1.8 mg into the skin once daily.    metformin (GLUCOPHAGE-XR) 500 MG 24 hr tablet Take 2 tablets (1,000 mg total) by mouth 2 (two) times daily with meals.    omega-3 fatty acids-vitamin E (FISH OIL) 1,000 mg Cap Take 1 capsule by mouth 2 (two) times daily.    ONETOUCH DELICA LANCETS 33 gauge Misc     predniSONE (DELTASONE) 10 MG tablet Take 1 tablet (10 mg total) by mouth once daily.    ranitidine (ZANTAC) 150 MG tablet Take 1 tablet (150 mg total) by mouth 2 (two) times daily.    sertraline (ZOLOFT) 100 MG tablet 1 and half tablet daily (Patient taking differently: Take 150 mg by mouth once daily. )    sulfamethoxazole-trimethoprim 800-160mg (BACTRIM DS) 800-160 mg Tab Take every Monday, Wednesday, and Friday.  Infectious Disease will decide if this is needed in 3-4 months.    valsartan (DIOVAN) 320 MG tablet Take 1 tablet (320 mg total) by mouth once " daily.    vitamin D 1000 units Tab Take 5 tablets (5,000 Units total) by mouth once daily.    calcium carbonate (OS-DONNA) 500 mg calcium (1,250 mg) tablet Take 2 tablets (1,000 mg total) by mouth once.     Family History     Problem Relation (Age of Onset)    Cancer Father    Diabetes Maternal Aunt    Heart disease Mother    Heart failure Mother    Hypertension Mother        Social History Main Topics    Smoking status: Never Smoker    Smokeless tobacco: Never Used    Alcohol use No    Drug use: No    Sexual activity: Not on file     Review of Systems   Constitutional: Negative for fever.   Eyes: Negative for visual disturbance.   Respiratory: Negative for shortness of breath.    Cardiovascular: Negative for leg swelling.   Gastrointestinal: Negative for abdominal pain.   Musculoskeletal: Positive for back pain.   Neurological: Positive for weakness (left leg).   Psychiatric/Behavioral: Negative for agitation.     Objective:     Vital Signs (Most Recent):  Temp: 98.8 °F (37.1 °C) (11/20/17 1343)  Pulse: 85 (11/20/17 1451)  Resp: 14 (11/20/17 1451)  BP: 127/67 (11/20/17 1451)  SpO2: (!) 94 % (11/20/17 1451) Vital Signs (24h Range):  Temp:  [98.8 °F (37.1 °C)] 98.8 °F (37.1 °C)  Pulse:  [85] 85  Resp:  [14-16] 14  SpO2:  [94 %-98 %] 94 %  BP: (127)/(67-74) 127/67     Weight: 115.7 kg (255 lb)  Body mass index is 35.57 kg/m².    Physical Exam   Constitutional: He appears well-developed and well-nourished.   HENT:   Head: Normocephalic and atraumatic.   Pulmonary/Chest: Effort normal.   Abdominal: He exhibits no distension.   Neurological:   Reflex Scores:       Tricep reflexes are 2+ on the right side and 2+ on the left side.       Bicep reflexes are 2+ on the right side and 2+ on the left side.       Brachioradialis reflexes are 2+ on the right side and 2+ on the left side.       Patellar reflexes are 2+ on the right side and 2+ on the left side.       Achilles reflexes are 2+ on the right side and 2+ on the  left side.  Skin: Skin is warm and dry.   Psychiatric: His speech is normal.       NEUROLOGICAL EXAMINATION:     MENTAL STATUS   Speech: speech is normal   Level of consciousness: alert    CRANIAL NERVES     CN VII   Facial expression full, symmetric.     MOTOR EXAM   Muscle bulk: normal  Overall muscle tone: normal    Strength   Right iliopsoas: 5/5  Left iliopsoas: 4/5  Right quadriceps: 5/5  Left quadriceps: 5/5  Right hamstrin/5  Left hamstrin/5  Right anterior tibial: 5/5  Left anterior tibial: 5/5  Right gastroc: 5/5  Left gastroc: 5/5    REFLEXES     Reflexes   Right brachioradialis: 2+  Left brachioradialis: 2+  Right biceps: 2+  Left biceps: 2+  Right triceps: 2+  Left triceps: 2+  Right patellar: 2+  Left patellar: 2+  Right achilles: 2+  Left achilles: 2+  Right plantar: normal  Left plantar: normal  Right ankle clonus: absent  Left ankle clonus: absent    SENSORY EXAM   Light touch normal.       Significant Labs:   CBC:   Recent Labs  Lab 17  1502   WBC 7.66   HGB 9.4*   HCT 26.7*        CMP:   Recent Labs  Lab 17  1502   *      K 4.1      CO2 22*   BUN 27*   CREATININE 0.9   CALCIUM 9.0   PROT 6.4   ALBUMIN 3.3*   BILITOT 0.6   ALKPHOS 61   AST 16   ALT 17   ANIONGAP 11   EGFRNONAA >60.0       Significant Imaging: I have reviewed all pertinent imaging results/findings within the past 24 hours.    MRI Brain w wo contrast 17  1. Findings compatible with a small acute lacunar infarct adjacent to the left frontal horn.  Nonspecific enhancement just inferior to this region and extending into the left basal ganglia, as well as within the inferior aspect of the left cerebellum.  No evidence of a focal mass.  2.  Extensive increased signal intensity involving the periventricular white matter compatible with demyelinating disease versus vasculitis.    MRI lumbar spine 17  Acute burst type fracture of the L4 vertebra with fracture line extending to the  posterior cortex of the L4 vertebra.  There is loss of central vertebral body height of the superior end plate of approximately 40%    Assessment and Plan:     CNS vasculitis    Mr. Nunez is a 57 year old man with CNS vasculitis, managed by Dr. Love and on monthly cyclophosphamide. He is admitted for a lumbar burst fracture after a fall. According to the patient and his wife he has been slowly improving with treatment with cytoxan. Neither are concerned for any active issues regarding this.    Recommendations:  -- No additional workup from out perspective  -- Continue cyclophosphamide as previously scheduled  -- Management of lumbar fracture per Neurosurgery            VTE Risk Mitigation         Ordered     Medium Risk of VTE  Once      11/20/17 1639     Place sequential compression device  Until discontinued      11/20/17 1639          Thank you for your consult. I will sign off. Please contact us if you have any additional questions.    Sary Langley MD  Neurology  [facility]    I have seen the patient, reviewed the Resident's history and physical, assessment and plan. I have personally interviewed and examined the patient at bedside and agree with the findings. The Resident Physician and I have spent a total of more than 50% in face to face time in a 45 minute visit in guidance, counseling, and discussion of treatment options.     Himanshu Watson MD  [facility]

## 2017-11-20 NOTE — HPI
"Mr. Nunez is a 57 year old man with a history of CNS vasculitis diagnosed in May 2017, followed by Dr. Love on monthly cyclophosphamide. He presented to the ED today after getting an MRI of his lumbar spine which showed an L4 burst fracture. He had initially presented to the ED on 11/14/17 after a fall from tripping. It appears as though he was discharged, then radiology called the ordering physician the next day notifying him of the fracture (difficult to fully determine timeline). The ED physician attempted to get him an appointment at [facility] (limited by medicaid status), [facility] (don't see spine) and [facility] (no answer). The patient's wife got in contact with Dr. Love's office who ordered the MRI.    He was last seen by outpatient Neurology for CNS vasculitis on 10/20/17 and was noted to be improving though his "cognitive performance is about 30% of normal" according to his wife. His last dose of cytoxan was 11/17/17. At that time he was alert, oriented (person, place and month) and aware of the upcoming holiday (Halloween).   "

## 2017-11-20 NOTE — TELEPHONE ENCOUNTER
Call received from Bekah (wife). She states pt had another fall in the bathtub on Saturday. He denies injury. She states she is very worried about him because she feels as though he is getting weaker and weaker by the day. Lumbar MRI scheduled today.

## 2017-11-20 NOTE — ED NOTES
"Pt states "I have been losing my balance a lot lately for the past two months. I fell twice and after the second fall I went to my doctor. She ordered an MRI which I had this morning in [place] and they called him to come here to the ED. I guess they saw something." He reports lower back pain, denies hitting head, denies LOC.   "

## 2017-11-20 NOTE — SUBJECTIVE & OBJECTIVE
Past Medical History:   Diagnosis Date    CNS vasculitis 6/2/2017    Follows with Dr. Love By mri.  Several active lesions, many old. 5/13: MRA brain/neck, MRI brain w/wo contrast show no vascular occlusion, multiple foci of hyperintensity in deep cerebral periventricular white matter in pattern of demyelinating process.  5/17: MRI spine performed, no spinal cord lesions. Hospitalization 6/2017. EEG was performed negative for seizures.  Repeat MRI showing new lesion, question whether this was demyelination versus CVA. 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 Findings compatible with a small acute lacunar infarct adjacent to the left frontal horn.  Nonspecific enhancement just inferior to this region and extending into the left basal ganglia, as well as within the inferior aspect of the left cerebellum.  No evidence of a focal mass. 2.  Extensive increased signal intensity involving the periventricular white matter compatible with demyelinating disease versus vasculitis. 3.  Clinical correlation and followup recommended. 4.  This reportedly flattened in the EPIC and the corpus callosum medical record system.    Mixed hyperlipidemia 11/9/2012    NAFLD (nonalcoholic fatty liver disease) 10/11/2013    CHASE (nonalcoholic steatohepatitis)     Obesity     Stroke     Type 2 diabetes mellitus with diabetic polyneuropathy, with long-term current use of insulin 5/14/2017    Type 2 diabetes mellitus with hyperglycemia, with long-term current use of insulin 10/11/2013       Past Surgical History:   Procedure Laterality Date    SKIN CANCER EXCISION         Review of patient's allergies indicates:  No Known Allergies    Current Neurological Medications: None    No current facility-administered medications  "on file prior to encounter.      Current Outpatient Prescriptions on File Prior to Encounter   Medication Sig    aspirin (ECOTRIN) 81 MG EC tablet Take 81 mg by mouth once daily.    atenolol (TENORMIN) 50 MG tablet Take 1 tablet (50 mg total) by mouth once daily.    atorvastatin (LIPITOR) 40 MG tablet Take 1 tablet (40 mg total) by mouth once daily.    blood sugar diagnostic Strp To check BG 4 times daily, to use with insurance preferred meter    blood-glucose meter kit To check BG 4 times daily, one touch verio meter. Dx code e11.65    diltiaZEM (DILACOR XR) 240 MG CDCR Take 1 capsule (240 mg total) by mouth once daily.    insulin glargine (BASAGLAR KWIKPEN) 100 unit/mL (3 mL) InPn pen Inject 32 Units into the skin 2 (two) times daily. Once in the morning at 9 am and once in the evening at 9 pm.    insulin lispro (HUMALOG) 100 unit/mL injection Inject 20 units before meals    insulin needles, disposable, (BD ULTRA-FINE ANA PEN NEEDLES) 32 x 5/32 " Ndle Inject twice daily    insulin syringe-needle U-100 (INSULIN SYRINGE) 1/2 mL 30 gauge x 5/16 Syrg Use 3x/day    lancets Misc To check BG 4 times daily, one touch verio meter. Dx code e11.65    levetiracetam (KEPPRA) 500 MG Tab Take 1 tablet (500 mg total) by mouth 2 (two) times daily.    liraglutide 0.6 mg/0.1 mL, 18 mg/3 mL, subq PNIJ (VICTOZA 3-TOMASZ) 0.6 mg/0.1 mL (18 mg/3 mL) PnIj Inject 1.8 mg into the skin once daily.    metformin (GLUCOPHAGE-XR) 500 MG 24 hr tablet Take 2 tablets (1,000 mg total) by mouth 2 (two) times daily with meals.    omega-3 fatty acids-vitamin E (FISH OIL) 1,000 mg Cap Take 1 capsule by mouth 2 (two) times daily.    ONETOUCH DELICA LANCETS 33 gauge Misc     predniSONE (DELTASONE) 10 MG tablet Take 1 tablet (10 mg total) by mouth once daily.    ranitidine (ZANTAC) 150 MG tablet Take 1 tablet (150 mg total) by mouth 2 (two) times daily.    sertraline (ZOLOFT) 100 MG tablet 1 and half tablet daily (Patient taking " differently: Take 150 mg by mouth once daily. )    sulfamethoxazole-trimethoprim 800-160mg (BACTRIM DS) 800-160 mg Tab Take every Monday, Wednesday, and Friday.  Infectious Disease will decide if this is needed in 3-4 months.    valsartan (DIOVAN) 320 MG tablet Take 1 tablet (320 mg total) by mouth once daily.    vitamin D 1000 units Tab Take 5 tablets (5,000 Units total) by mouth once daily.    calcium carbonate (OS-DONNA) 500 mg calcium (1,250 mg) tablet Take 2 tablets (1,000 mg total) by mouth once.     Family History     Problem Relation (Age of Onset)    Cancer Father    Diabetes Maternal Aunt    Heart disease Mother    Heart failure Mother    Hypertension Mother        Social History Main Topics    Smoking status: Never Smoker    Smokeless tobacco: Never Used    Alcohol use No    Drug use: No    Sexual activity: Not on file     Review of Systems   Constitutional: Negative for fever.   Eyes: Negative for visual disturbance.   Respiratory: Negative for shortness of breath.    Cardiovascular: Negative for leg swelling.   Gastrointestinal: Negative for abdominal pain.   Musculoskeletal: Positive for back pain.   Neurological: Positive for weakness (left leg).   Psychiatric/Behavioral: Negative for agitation.     Objective:     Vital Signs (Most Recent):  Temp: 98.8 °F (37.1 °C) (11/20/17 1343)  Pulse: 85 (11/20/17 1451)  Resp: 14 (11/20/17 1451)  BP: 127/67 (11/20/17 1451)  SpO2: (!) 94 % (11/20/17 1451) Vital Signs (24h Range):  Temp:  [98.8 °F (37.1 °C)] 98.8 °F (37.1 °C)  Pulse:  [85] 85  Resp:  [14-16] 14  SpO2:  [94 %-98 %] 94 %  BP: (127)/(67-74) 127/67     Weight: 115.7 kg (255 lb)  Body mass index is 35.57 kg/m².    Physical Exam   Constitutional: He appears well-developed and well-nourished.   HENT:   Head: Normocephalic and atraumatic.   Pulmonary/Chest: Effort normal.   Abdominal: He exhibits no distension.   Neurological:   Reflex Scores:       Tricep reflexes are 2+ on the right side and 2+ on  the left side.       Bicep reflexes are 2+ on the right side and 2+ on the left side.       Brachioradialis reflexes are 2+ on the right side and 2+ on the left side.       Patellar reflexes are 2+ on the right side and 2+ on the left side.       Achilles reflexes are 2+ on the right side and 2+ on the left side.  Skin: Skin is warm and dry.   Psychiatric: His speech is normal.       NEUROLOGICAL EXAMINATION:     MENTAL STATUS   Speech: speech is normal   Level of consciousness: alert    CRANIAL NERVES     CN VII   Facial expression full, symmetric.     MOTOR EXAM   Muscle bulk: normal  Overall muscle tone: normal    Strength   Right iliopsoas: 5/5  Left iliopsoas: 4/5  Right quadriceps: 5/5  Left quadriceps: 5/5  Right hamstrin/5  Left hamstrin/5  Right anterior tibial: 5/5  Left anterior tibial: 5/5  Right gastroc: 5/5  Left gastroc: 5/5    REFLEXES     Reflexes   Right brachioradialis: 2+  Left brachioradialis: 2+  Right biceps: 2+  Left biceps: 2+  Right triceps: 2+  Left triceps: 2+  Right patellar: 2+  Left patellar: 2+  Right achilles: 2+  Left achilles: 2+  Right plantar: normal  Left plantar: normal  Right ankle clonus: absent  Left ankle clonus: absent    SENSORY EXAM   Light touch normal.       Significant Labs:   CBC:   Recent Labs  Lab 17  1502   WBC 7.66   HGB 9.4*   HCT 26.7*        CMP:   Recent Labs  Lab 17  1502   *      K 4.1      CO2 22*   BUN 27*   CREATININE 0.9   CALCIUM 9.0   PROT 6.4   ALBUMIN 3.3*   BILITOT 0.6   ALKPHOS 61   AST 16   ALT 17   ANIONGAP 11   EGFRNONAA >60.0       Significant Imaging: I have reviewed all pertinent imaging results/findings within the past 24 hours.

## 2017-11-20 NOTE — TELEPHONE ENCOUNTER
Call placed to Bekah. Informed her of Dr Love rec of ED. Bekah agrees with this as pt is in a lot of pain today as well. Plan is to have lumbar MRI at [facility] as planned and then come to [facility] ED. States should be here by 1pm.

## 2017-11-21 LAB
ALBUMIN SERPL BCP-MCNC: 3.1 G/DL
ALP SERPL-CCNC: 62 U/L
ALT SERPL W/O P-5'-P-CCNC: 16 U/L
ANION GAP SERPL CALC-SCNC: 9 MMOL/L
AST SERPL-CCNC: 15 U/L
BASOPHILS # BLD AUTO: 0.01 K/UL
BASOPHILS NFR BLD: 0.2 %
BILIRUB SERPL-MCNC: 0.7 MG/DL
BUN SERPL-MCNC: 19 MG/DL
CALCIUM SERPL-MCNC: 9.9 MG/DL
CHLORIDE SERPL-SCNC: 105 MMOL/L
CO2 SERPL-SCNC: 28 MMOL/L
CREAT SERPL-MCNC: 0.8 MG/DL
DIFFERENTIAL METHOD: ABNORMAL
EOSINOPHIL # BLD AUTO: 0 K/UL
EOSINOPHIL NFR BLD: 0.4 %
ERYTHROCYTE [DISTWIDTH] IN BLOOD BY AUTOMATED COUNT: 15.4 %
EST. GFR  (AFRICAN AMERICAN): >60 ML/MIN/1.73 M^2
EST. GFR  (NON AFRICAN AMERICAN): >60 ML/MIN/1.73 M^2
GLUCOSE SERPL-MCNC: 90 MG/DL
HCT VFR BLD AUTO: 25.8 %
HGB BLD-MCNC: 9 G/DL
IMM GRANULOCYTES # BLD AUTO: 0.02 K/UL
IMM GRANULOCYTES NFR BLD AUTO: 0.4 %
LYMPHOCYTES # BLD AUTO: 1.3 K/UL
LYMPHOCYTES NFR BLD: 22.1 %
MAGNESIUM SERPL-MCNC: 1.6 MG/DL
MCH RBC QN AUTO: 29.5 PG
MCHC RBC AUTO-ENTMCNC: 34.9 G/DL
MCV RBC AUTO: 85 FL
MONOCYTES # BLD AUTO: 0.6 K/UL
MONOCYTES NFR BLD: 9.7 %
NEUTROPHILS # BLD AUTO: 3.8 K/UL
NEUTROPHILS NFR BLD: 67.2 %
NRBC BLD-RTO: 0 /100 WBC
PHOSPHATE SERPL-MCNC: 3.5 MG/DL
PLATELET # BLD AUTO: 152 K/UL
PMV BLD AUTO: 9.4 FL
POCT GLUCOSE: 105 MG/DL (ref 70–110)
POCT GLUCOSE: 139 MG/DL (ref 70–110)
POCT GLUCOSE: 211 MG/DL (ref 70–110)
POCT GLUCOSE: 257 MG/DL (ref 70–110)
POCT GLUCOSE: 88 MG/DL (ref 70–110)
POTASSIUM SERPL-SCNC: 4.5 MMOL/L
PROT SERPL-MCNC: 6.2 G/DL
RBC # BLD AUTO: 3.05 M/UL
SODIUM SERPL-SCNC: 142 MMOL/L
WBC # BLD AUTO: 5.66 K/UL

## 2017-11-21 PROCEDURE — 25000003 PHARM REV CODE 250: Performed by: HOSPITALIST

## 2017-11-21 PROCEDURE — 84100 ASSAY OF PHOSPHORUS: CPT

## 2017-11-21 PROCEDURE — G0378 HOSPITAL OBSERVATION PER HR: HCPCS

## 2017-11-21 PROCEDURE — 20600001 HC STEP DOWN PRIVATE ROOM

## 2017-11-21 PROCEDURE — 63600175 PHARM REV CODE 636 W HCPCS: Performed by: HOSPITALIST

## 2017-11-21 PROCEDURE — 85025 COMPLETE CBC W/AUTO DIFF WBC: CPT

## 2017-11-21 PROCEDURE — 36415 COLL VENOUS BLD VENIPUNCTURE: CPT

## 2017-11-21 PROCEDURE — 80053 COMPREHEN METABOLIC PANEL: CPT

## 2017-11-21 PROCEDURE — 99233 SBSQ HOSP IP/OBS HIGH 50: CPT | Mod: ,,, | Performed by: INTERNAL MEDICINE

## 2017-11-21 PROCEDURE — 83735 ASSAY OF MAGNESIUM: CPT

## 2017-11-21 RX ADMIN — SERTRALINE 150 MG: 100 TABLET, FILM COATED ORAL at 09:11

## 2017-11-21 RX ADMIN — LEVETIRACETAM 500 MG: 500 TABLET ORAL at 10:11

## 2017-11-21 RX ADMIN — VITAMIN D, TAB 1000IU (100/BT) 5000 UNITS: 25 TAB at 09:11

## 2017-11-21 RX ADMIN — INSULIN ASPART 10 UNITS: 100 INJECTION, SOLUTION INTRAVENOUS; SUBCUTANEOUS at 05:11

## 2017-11-21 RX ADMIN — LEVETIRACETAM 500 MG: 500 TABLET ORAL at 09:11

## 2017-11-21 RX ADMIN — INSULIN ASPART 10 UNITS: 100 INJECTION, SOLUTION INTRAVENOUS; SUBCUTANEOUS at 12:11

## 2017-11-21 RX ADMIN — ATORVASTATIN CALCIUM 40 MG: 20 TABLET, FILM COATED ORAL at 09:11

## 2017-11-21 RX ADMIN — VALSARTAN 320 MG: 160 TABLET ORAL at 09:11

## 2017-11-21 RX ADMIN — DILTIAZEM HYDROCHLORIDE 240 MG: 240 CAPSULE, COATED, EXTENDED RELEASE ORAL at 09:11

## 2017-11-21 RX ADMIN — ASPIRIN 81 MG: 81 TABLET, COATED ORAL at 09:11

## 2017-11-21 RX ADMIN — INSULIN DETEMIR 20 UNITS: 100 INJECTION, SOLUTION SUBCUTANEOUS at 10:11

## 2017-11-21 RX ADMIN — PREDNISONE 10 MG: 10 TABLET ORAL at 09:11

## 2017-11-21 RX ADMIN — ATENOLOL 50 MG: 50 TABLET ORAL at 09:11

## 2017-11-21 NOTE — SUBJECTIVE & OBJECTIVE
"Prescriptions Prior to Admission   Medication Sig Dispense Refill Last Dose    aspirin (ECOTRIN) 81 MG EC tablet Take 81 mg by mouth once daily.   11/20/2017    atenolol (TENORMIN) 50 MG tablet Take 1 tablet (50 mg total) by mouth once daily. 90 tablet 3 11/20/2017    atorvastatin (LIPITOR) 40 MG tablet Take 1 tablet (40 mg total) by mouth once daily. 90 tablet 3 11/20/2017    blood sugar diagnostic Strp To check BG 4 times daily, to use with insurance preferred meter 400 each 3 11/20/2017    blood-glucose meter kit To check BG 4 times daily, one touch verio meter. Dx code e11.65 1 each 0 11/20/2017    diltiaZEM (DILACOR XR) 240 MG CDCR Take 1 capsule (240 mg total) by mouth once daily. 90 capsule 3 11/20/2017    insulin glargine (BASAGLAR KWIKPEN) 100 unit/mL (3 mL) InPn pen Inject 32 Units into the skin 2 (two) times daily. Once in the morning at 9 am and once in the evening at 9 pm. 2 Box 2 11/20/2017    insulin lispro (HUMALOG) 100 unit/mL injection Inject 20 units before meals 2 vial 3 11/20/2017    insulin needles, disposable, (BD ULTRA-FINE ANA PEN NEEDLES) 32 x 5/32 " Ndle Inject twice daily 100 each 3 11/20/2017    insulin syringe-needle U-100 (INSULIN SYRINGE) 1/2 mL 30 gauge x 5/16 Syrg Use 3x/day 300 each 3 11/20/2017    lancets Misc To check BG 4 times daily, one touch verio meter. Dx code e11.65 400 each 3 11/20/2017    levetiracetam (KEPPRA) 500 MG Tab Take 1 tablet (500 mg total) by mouth 2 (two) times daily. 60 tablet 2 11/20/2017    liraglutide 0.6 mg/0.1 mL, 18 mg/3 mL, subq PNIJ (VICTOZA 3-TOMASZ) 0.6 mg/0.1 mL (18 mg/3 mL) PnIj Inject 1.8 mg into the skin once daily. 9 mL 3 11/20/2017    metformin (GLUCOPHAGE-XR) 500 MG 24 hr tablet Take 2 tablets (1,000 mg total) by mouth 2 (two) times daily with meals. 360 tablet 1 11/20/2017    omega-3 fatty acids-vitamin E (FISH OIL) 1,000 mg Cap Take 1 capsule by mouth 2 (two) times daily.  0 11/20/2017    ONETOUCH DELICA LANCETS 33 gauge Misc "    11/20/2017    predniSONE (DELTASONE) 10 MG tablet Take 1 tablet (10 mg total) by mouth once daily. 30 tablet 1 11/20/2017    ranitidine (ZANTAC) 150 MG tablet Take 1 tablet (150 mg total) by mouth 2 (two) times daily. 60 tablet 11 11/20/2017    sertraline (ZOLOFT) 100 MG tablet 1 and half tablet daily (Patient taking differently: Take 150 mg by mouth once daily. ) 135 tablet 3 11/20/2017    sulfamethoxazole-trimethoprim 800-160mg (BACTRIM DS) 800-160 mg Tab Take every Monday, Wednesday, and Friday.  Infectious Disease will decide if this is needed in 3-4 months. 12 tablet 3 11/20/2017    valsartan (DIOVAN) 320 MG tablet Take 1 tablet (320 mg total) by mouth once daily. 90 tablet 3 11/20/2017    vitamin D 1000 units Tab Take 5 tablets (5,000 Units total) by mouth once daily.   11/20/2017    calcium carbonate (OS-DONNA) 500 mg calcium (1,250 mg) tablet Take 2 tablets (1,000 mg total) by mouth once.  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 Love By mri.  Several active lesions, many old. 5/13: MRA brain/neck, MRI brain w/wo contrast show no vascular occlusion, multiple foci of hyperintensity in deep cerebral periventricular white matter in pattern of demyelinating process.  5/17: MRI spine performed, no spinal cord lesions. Hospitalization 6/2017. EEG was performed negative for seizures.  Repeat MRI showing new lesion, question whether this was demyelination versus CVA. 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 Findings compatible with a small acute lacunar infarct adjacent to the left frontal horn.  Nonspecific enhancement just inferior to this region and extending into the  left basal ganglia, as well as within the inferior aspect of the left cerebellum.  No evidence of a focal mass. 2.  Extensive increased signal intensity involving the periventricular white matter compatible with demyelinating disease versus vasculitis. 3.  Clinical correlation and followup recommended. 4.  This reportedly flattened in the EPIC and the corpus callosum medical record system.    Mixed hyperlipidemia 11/9/2012    NAFLD (nonalcoholic fatty liver disease) 10/11/2013    CHASE (nonalcoholic steatohepatitis)     Obesity     Stroke     Type 2 diabetes mellitus with diabetic polyneuropathy, with long-term current use of insulin 5/14/2017    Type 2 diabetes mellitus with hyperglycemia, with long-term current use of insulin 10/11/2013     Past Surgical History:   Procedure Laterality Date    SKIN CANCER EXCISION       Family History     Problem Relation (Age of Onset)    Cancer Father    Diabetes Maternal Aunt    Heart disease Mother    Heart failure Mother    Hypertension Mother        Social History Main Topics    Smoking status: Never Smoker    Smokeless tobacco: Never Used    Alcohol use No    Drug use: No    Sexual activity: Not on file     Review of Systems   Constitutional: Negative for activity change, diaphoresis, fatigue, fever and unexpected weight change.   Eyes: Negative for visual disturbance.   Respiratory: Negative for cough, shortness of breath and wheezing.    Cardiovascular: Negative for chest pain and palpitations.   Gastrointestinal: Negative for abdominal distention, abdominal pain, blood in stool, constipation, diarrhea, nausea and vomiting.   Genitourinary: Negative for difficulty urinating, enuresis, frequency and urgency.   Musculoskeletal: Positive for back pain. Negative for gait problem, joint swelling, myalgias, neck pain and neck stiffness.   Skin: Negative for color change, rash and wound.   Neurological: Positive for weakness. Negative for dizziness, seizures, facial  asymmetry, speech difficulty, light-headedness, numbness and headaches.   Hematological: Does not bruise/bleed easily.   Psychiatric/Behavioral: Negative for agitation, behavioral problems, dysphoric mood and hallucinations. The patient is not nervous/anxious.      Objective:     Weight: 115.7 kg (255 lb)  Body mass index is 35.57 kg/m².  Vital Signs (Most Recent):  Temp: 99.2 °F (37.3 °C) (11/21/17 0803)  Pulse: 86 (11/21/17 0803)  Resp: 16 (11/21/17 0803)  BP: (!) 153/93 (11/21/17 0803)  SpO2: 98 % (11/21/17 0803) Vital Signs (24h Range):  Temp:  [97.7 °F (36.5 °C)-99.2 °F (37.3 °C)] 99.2 °F (37.3 °C)  Pulse:  [73-86] 86  Resp:  [14-19] 16  SpO2:  [94 %-98 %] 98 %  BP: (127-190)/(67-98) 153/93          Neurosurgery Physical Exam  General: well developed, well nourished, no distress.   Head: normocephalic, atraumatic  Neurologic: Alert and oriented. Mild confusion   GCS: Motor: 6/Verbal: 5/Eyes: 4 GCS Total: 15  Mental Status: Awake, Alert, Oriented x3  Cranial nerves: face symmetric, tongue midline, CN II-XII grossly intact.   Eyes: pupils equal, round, reactive to light with accomodation, EOMI.   Sensory: intact to light touch throughout  Motor Strength: Moves all extremities spontaneously with good tone.  Full strength upper and lower extremities.     Strength  Deltoids Triceps Biceps Wrist Extension Wrist Flexion Hand    Upper: R 5/5 5/5 5/5 5/5 5/5 5/5    L 5/5 5/5 5/5 5/5 5/5 5/5     Iliopsoas Quadriceps Knee  Flexion Tibialis  anterior Gastro- cnemius EHL   Lower: R 4/5 5/5 5/5 5/5 5/5 5/5    L 5/5 5/5 5/5 5/5 5/5 5/5     DTR's - 2 + and symmetric in UE and LE  Pronator Drift: no drift noted  Finger-to-nose: Intact bilaterally  Agarwal: absent  Clonus: absent  Babinski: absent  Pulses: 2+ and symmetric radial and dorsalis pedis. No lower extremity edema      Significant Labs:    Recent Labs  Lab 11/20/17  1502 11/21/17  0501   * 90    142   K 4.1 4.5    105   CO2 22* 28   BUN 27* 19    CREATININE 0.9 0.8   CALCIUM 9.0 9.9   MG  --  1.6       Recent Labs  Lab 11/20/17  1502 11/21/17  0501   WBC 7.66 5.66   HGB 9.4* 9.0*   HCT 26.7* 25.8*    152       Recent Labs  Lab 11/20/17  1502   INR 1.0   APTT 21.5     Microbiology Results (last 7 days)     ** No results found for the last 168 hours. **        All pertinent labs from the last 24 hours have been reviewed.    Significant Diagnostics:  MRI lumbar spine 11/20 reviewed. Acute burst type fracture of the L4 vertebra with fracture line extending to the posterior cortex of the L4 vertebra.  There is loss of central vertebral body height of the superior end plate of approximately 40%. No retropulsion.     Physical Exam

## 2017-11-21 NOTE — SUBJECTIVE & OBJECTIVE
Interval History: NAEON. Pending brace delivery and standing films.     Medications:  Continuous Infusions:   Scheduled Meds:   aspirin  81 mg Oral Daily    atenolol  50 mg Oral Daily    atorvastatin  40 mg Oral Daily    diltiaZEM  240 mg Oral Daily    insulin aspart  10 Units Subcutaneous TIDWM    insulin detemir  20 Units Subcutaneous QHS    levETIRAcetam  500 mg Oral BID    predniSONE  10 mg Oral Daily    sertraline  150 mg Oral Daily    [START ON 11/22/2017] sulfamethoxazole-trimethoprim 800-160mg  1 tablet Oral Every Mon, Wed, Fri    valsartan  320 mg Oral Daily    vitamin D  5,000 Units Oral Daily     PRN Meds:dextrose 50%, dextrose 50%, glucagon (human recombinant), glucose, glucose, ondansetron, sodium chloride 0.9%     Review of Systems  Objective:     Weight: 115.7 kg (255 lb)  Body mass index is 35.57 kg/m².  Vital Signs (Most Recent):  Temp: 98.3 °F (36.8 °C) (11/21/17 1234)  Pulse: 94 (11/21/17 1234)  Resp: 18 (11/21/17 1234)  BP: (!) 144/74 (11/21/17 1234)  SpO2: (!) 94 % (11/21/17 1234) Vital Signs (24h Range):  Temp:  [97.7 °F (36.5 °C)-99.2 °F (37.3 °C)] 98.3 °F (36.8 °C)  Pulse:  [73-94] 94  Resp:  [16-19] 18  SpO2:  [94 %-98 %] 94 %  BP: (131-190)/(68-98) 144/74                           Neurosurgery Physical Exam  General: well developed, well nourished, no distress.   Head: normocephalic, atraumatic  Neurologic: Alert and oriented. Mild confusion   GCS: Motor: 6/Verbal: 5/Eyes: 4 GCS Total: 15  Mental Status: Awake, Alert, Oriented x3  Cranial nerves: face symmetric, tongue midline, CN II-XII grossly intact.   Eyes: pupils equal, round, reactive to light with accomodation, EOMI.   Sensory: intact to light touch throughout  Motor Strength: Moves all extremities spontaneously with good tone.  Full strength upper and lower extremities.      Strength   Deltoids Triceps Biceps Wrist Extension Wrist Flexion Hand    Upper: R 5/5 5/5 5/5 5/5 5/5 5/5     L 5/5 5/5 5/5 5/5 5/5 5/5        Iliopsoas Quadriceps Knee  Flexion Tibialis  anterior Gastro- cnemius EHL   Lower: R 4/5 5/5 5/5 5/5 5/5 5/5     L 5/5 5/5 5/5 5/5 5/5 5/5      DTR's - 2 + and symmetric in UE and LE  Pronator Drift: no drift noted  Finger-to-nose: Intact bilaterally  Agarwal: absent  Clonus: absent  Babinski: absent  Pulses: 2+ and symmetric radial and dorsalis pedis. No lower extremity edema    Significant Labs:    Recent Labs  Lab 11/20/17  1502 11/21/17  0501   * 90    142   K 4.1 4.5    105   CO2 22* 28   BUN 27* 19   CREATININE 0.9 0.8   CALCIUM 9.0 9.9   MG  --  1.6       Recent Labs  Lab 11/20/17  1502 11/21/17  0501   WBC 7.66 5.66   HGB 9.4* 9.0*   HCT 26.7* 25.8*    152       Recent Labs  Lab 11/20/17  1502   INR 1.0   APTT 21.5     Microbiology Results (last 7 days)     ** No results found for the last 168 hours. **        Significant Diagnostics:  Standing xrays pending.

## 2017-11-21 NOTE — PLAN OF CARE
11/21/17 1026   Discharge Assessment   Assessment Type Discharge Planning Assessment   Confirmed/corrected address and phone number on facesheet? Yes   Assessment information obtained from? Patient;Caregiver   Expected Length of Stay (days) 3   Communicated expected length of stay with patient/caregiver yes   Prior to hospitilization cognitive status: Alert/Oriented   Prior to hospitalization functional status: Needs Assistance   Current cognitive status: Alert/Oriented   Current Functional Status: Needs Assistance   Lives With spouse   Able to Return to Prior Arrangements unable to determine at this time (comments)   Is patient able to care for self after discharge? No   Patient's perception of discharge disposition rehab facility   Readmission Within The Last 30 Days no previous admission in last 30 days   Patient currently being followed by outpatient case management? No   Equipment Currently Used at Home walker, rolling;cane, straight;shower chair   Do you have any problems affording any of your prescribed medications? No   Is the patient taking medications as prescribed? yes   Does the patient have transportation home? Yes   Transportation Available family or friend will provide   Discharge Plan A Rehab   Discharge Plan B Skilled Nursing Facility   Patient/Family In Agreement With Plan yes   Met with patient and spouse to discuss discharge planning. Patient's spouse states that patient has been falling at home and has had a decline in functional status. She is finding it difficult to manage him at home. She states that physician has ordered home health but it was never set up due to insurance coverage.(Medicaid plan). Therapy orders in place. Discussed possible rehab admission. They are interested in [facility] re[facility].

## 2017-11-21 NOTE — PLAN OF CARE
Problem: Patient Care Overview  Goal: Plan of Care Review  Outcome: Ongoing (interventions implemented as appropriate)  Pt remained free from falls. Wife remained at his bedside. Still waiting for pts back brace. Can't get up w/o it. Call light and personal items in reach.

## 2017-11-21 NOTE — PROGRESS NOTES
"[facility] Hospital Medicine  Progress Note    Patient Name: Bessy Nunez  MRN: [id number]  Patient Class: IP- Inpatient   Admission Date: 11/20/2017  Length of Stay: 1 days  Attending Physician: Braydon Lowe MD  Primary Care Provider: Edgar Nova MD    Hospital Medicine Team: [facility] HOSP MED B Braydon Lowe MD    Subjective:     Principal Problem:Closed compression fracture of L4 lumbar vertebra    HPI:  No notes on file    Hospital Course:  No notes on file    Prescriptions Prior to Admission   Medication Sig Dispense Refill Last Dose    aspirin (ECOTRIN) 81 MG EC tablet Take 81 mg by mouth once daily.   11/20/2017    atenolol (TENORMIN) 50 MG tablet Take 1 tablet (50 mg total) by mouth once daily. 90 tablet 3 11/20/2017    atorvastatin (LIPITOR) 40 MG tablet Take 1 tablet (40 mg total) by mouth once daily. 90 tablet 3 11/20/2017    blood sugar diagnostic Strp To check BG 4 times daily, to use with insurance preferred meter 400 each 3 11/20/2017    blood-glucose meter kit To check BG 4 times daily, one touch verio meter. Dx code e11.65 1 each 0 11/20/2017    diltiaZEM (DILACOR XR) 240 MG CDCR Take 1 capsule (240 mg total) by mouth once daily. 90 capsule 3 11/20/2017    insulin glargine (BASAGLAR KWIKPEN) 100 unit/mL (3 mL) InPn pen Inject 32 Units into the skin 2 (two) times daily. Once in the morning at 9 am and once in the evening at 9 pm. 2 Box 2 11/20/2017    insulin lispro (HUMALOG) 100 unit/mL injection Inject 20 units before meals 2 vial 3 11/20/2017    insulin needles, disposable, (BD ULTRA-FINE ANA PEN NEEDLES) 32 x 5/32 " Ndle Inject twice daily 100 each 3 11/20/2017    insulin syringe-needle U-100 (INSULIN SYRINGE) 1/2 mL 30 gauge x 5/16 Syrg Use 3x/day 300 each 3 11/20/2017    lancets Misc To check BG 4 times daily, one touch verio meter. Dx code e11.65 400 each 3 11/20/2017    levetiracetam (KEPPRA) 500 MG Tab Take 1 tablet (500 mg total) by mouth 2 (two) times " daily. 60 tablet 2 11/20/2017    liraglutide 0.6 mg/0.1 mL, 18 mg/3 mL, subq PNIJ (VICTOZA 3-TOMASZ) 0.6 mg/0.1 mL (18 mg/3 mL) PnIj Inject 1.8 mg into the skin once daily. 9 mL 3 11/20/2017    metformin (GLUCOPHAGE-XR) 500 MG 24 hr tablet Take 2 tablets (1,000 mg total) by mouth 2 (two) times daily with meals. 360 tablet 1 11/20/2017    omega-3 fatty acids-vitamin E (FISH OIL) 1,000 mg Cap Take 1 capsule by mouth 2 (two) times daily.  0 11/20/2017    ONETOUCH DELICA LANCETS 33 gauge Misc    11/20/2017    predniSONE (DELTASONE) 10 MG tablet Take 1 tablet (10 mg total) by mouth once daily. 30 tablet 1 11/20/2017    ranitidine (ZANTAC) 150 MG tablet Take 1 tablet (150 mg total) by mouth 2 (two) times daily. 60 tablet 11 11/20/2017    sertraline (ZOLOFT) 100 MG tablet 1 and half tablet daily (Patient taking differently: Take 150 mg by mouth once daily. ) 135 tablet 3 11/20/2017    sulfamethoxazole-trimethoprim 800-160mg (BACTRIM DS) 800-160 mg Tab Take every Monday, Wednesday, and Friday.  Infectious Disease will decide if this is needed in 3-4 months. 12 tablet 3 11/20/2017    valsartan (DIOVAN) 320 MG tablet Take 1 tablet (320 mg total) by mouth once daily. 90 tablet 3 11/20/2017    vitamin D 1000 units Tab Take 5 tablets (5,000 Units total) by mouth once daily.   11/20/2017    calcium carbonate (OS-DONNA) 500 mg calcium (1,250 mg) tablet Take 2 tablets (1,000 mg total) by mouth once.  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 Love By mri.  Several active lesions, many old. 5/13: MRA brain/neck, MRI brain w/wo contrast show no vascular occlusion, multiple foci of hyperintensity in deep cerebral periventricular white matter in pattern of demyelinating process.  5/17: MRI spine performed, no spinal cord lesions. Hospitalization 6/2017. EEG was performed negative for seizures.  Repeat MRI showing  new lesion, question whether this was demyelination versus CVA. 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 Findings compatible with a small acute lacunar infarct adjacent to the left frontal horn.  Nonspecific enhancement just inferior to this region and extending into the left basal ganglia, as well as within the inferior aspect of the left cerebellum.  No evidence of a focal mass. 2.  Extensive increased signal intensity involving the periventricular white matter compatible with demyelinating disease versus vasculitis. 3.  Clinical correlation and followup recommended. 4.  This reportedly flattened in the EPIC and the corpus callosum medical record system.    Mixed hyperlipidemia 11/9/2012    NAFLD (nonalcoholic fatty liver disease) 10/11/2013    CHASE (nonalcoholic steatohepatitis)     Obesity     Stroke     Type 2 diabetes mellitus with diabetic polyneuropathy, with long-term current use of insulin 5/14/2017    Type 2 diabetes mellitus with hyperglycemia, with long-term current use of insulin 10/11/2013     Past Surgical History:   Procedure Laterality Date    SKIN CANCER EXCISION       Family History     Problem Relation (Age of Onset)    Cancer Father    Diabetes Maternal Aunt    Heart disease Mother    Heart failure Mother    Hypertension Mother        Social History Main Topics    Smoking status: Never Smoker    Smokeless tobacco: Never Used    Alcohol use No    Drug use: No    Sexual activity: Not on file     Review of Systems   Constitutional: Negative for activity change, diaphoresis, fatigue, fever and unexpected weight change.   Eyes: Negative for visual disturbance.   Respiratory: Negative for cough, shortness of breath and wheezing.    Cardiovascular: Negative for chest pain  and palpitations.   Gastrointestinal: Negative for abdominal distention, abdominal pain, blood in stool, constipation, diarrhea, nausea and vomiting.   Genitourinary: Negative for difficulty urinating, enuresis, frequency and urgency.   Musculoskeletal: Positive for back pain. Negative for gait problem, joint swelling, myalgias, neck pain and neck stiffness.   Skin: Negative for color change, rash and wound.   Neurological: Positive for weakness. Negative for dizziness, seizures, facial asymmetry, speech difficulty, light-headedness, numbness and headaches.   Hematological: Does not bruise/bleed easily.   Psychiatric/Behavioral: Negative for agitation, behavioral problems, dysphoric mood and hallucinations. The patient is not nervous/anxious.      Objective:     Weight: 115.7 kg (255 lb)  Body mass index is 35.57 kg/m².  Vital Signs (Most Recent):  Temp: 99.2 °F (37.3 °C) (11/21/17 0803)  Pulse: 86 (11/21/17 0803)  Resp: 16 (11/21/17 0803)  BP: (!) 153/93 (11/21/17 0803)  SpO2: 98 % (11/21/17 0803) Vital Signs (24h Range):  Temp:  [97.7 °F (36.5 °C)-99.2 °F (37.3 °C)] 99.2 °F (37.3 °C)  Pulse:  [73-86] 86  Resp:  [14-19] 16  SpO2:  [94 %-98 %] 98 %  BP: (127-190)/(67-98) 153/93          Neurosurgery Physical Exam  General: well developed, well nourished, no distress.   Head: normocephalic, atraumatic  Neurologic: Alert and oriented. Mild confusion   GCS: Motor: 6/Verbal: 5/Eyes: 4 GCS Total: 15  Mental Status: Awake, Alert, Oriented x3  Cranial nerves: face symmetric, tongue midline, CN II-XII grossly intact.   Eyes: pupils equal, round, reactive to light with accomodation, EOMI.   Sensory: intact to light touch throughout  Motor Strength: Moves all extremities spontaneously with good tone.  Full strength upper and lower extremities.     Strength  Deltoids Triceps Biceps Wrist Extension Wrist Flexion Hand    Upper: R 5/5 5/5 5/5 5/5 5/5 5/5    L 5/5 5/5 5/5 5/5 5/5 5/5     Iliopsoas Quadriceps Knee  Flexion  Tibialis  anterior Gastro- cnemius EHL   Lower: R 4/5 5/5 5/5 5/5 5/5 5/5    L 5/5 5/5 5/5 5/5 5/5 5/5     DTR's - 2 + and symmetric in UE and LE  Pronator Drift: no drift noted  Finger-to-nose: Intact bilaterally  Agarwal: absent  Clonus: absent  Babinski: absent  Pulses: 2+ and symmetric radial and dorsalis pedis. No lower extremity edema      Significant Labs:    Recent Labs  Lab 11/20/17  1502 11/21/17  0501   * 90    142   K 4.1 4.5    105   CO2 22* 28   BUN 27* 19   CREATININE 0.9 0.8   CALCIUM 9.0 9.9   MG  --  1.6       Recent Labs  Lab 11/20/17  1502 11/21/17  0501   WBC 7.66 5.66   HGB 9.4* 9.0*   HCT 26.7* 25.8*    152       Recent Labs  Lab 11/20/17  1502   INR 1.0   APTT 21.5     Microbiology Results (last 7 days)     ** No results found for the last 168 hours. **        All pertinent labs from the last 24 hours have been reviewed.    Significant Diagnostics:  MRI lumbar spine 11/20 reviewed. Acute burst type fracture of the L4 vertebra with fracture line extending to the posterior cortex of the L4 vertebra.  There is loss of central vertebral body height of the superior end plate of approximately 40%. No retropulsion.     Physical Exam      Assessment/Plan:      * Closed compression fracture of L4 lumbar vertebra    No intervention p[er NS  Plan on SNF or rehab admit  Pian in reasonable control  Back brace            VTE Risk Mitigation         Ordered     Medium Risk of VTE  Once      11/20/17 1935     Place ELIZABETH hose  Until discontinued      11/20/17 1935     Place sequential compression device  Until discontinued      11/20/17 1935     Place sequential compression device  Until discontinued      11/20/17 1639              Braydon Lowe MD  Department of Hospital Medicine   [facility]

## 2017-11-21 NOTE — ASSESSMENT & PLAN NOTE
57 y.o. male with CNS vasculitis and MS with L4 fracture s/p fall last week.    -Patient neurologically stable on exam.  -No acute neurosurgical intervention.  -Recommend LSO brace and standing Xrays on the lumbar spine in the brace.  -If xrays are stable, will plan to see the patient in Dr. Jaffe's clinic in 4 weeks with repeat Xrays.   -q4h neuro checks

## 2017-11-21 NOTE — PROGRESS NOTES
[facility]  Neurosurgery  Progress Note    Subjective:     History of Present Illness: Bessy Nunez is a 57 y.o. male with history of CNS vasculitis, MS on HD CTX, CHASE, DMII, lacunar stroke 9/2017 and HTN who presented to the ED referred by his neurologist for evaluation of L4 fracture. The patient has baseline gait instability and fell backwards one week ago when walking into his house. He struck his back on a brick ledge which caused severe localized low back pain. He went to the ED that night where xrays were reportedly negative. He saw his neurologist today who ordered an MRI and sent the patient for neurosurgical evaluation. He denies radicular leg pain, new numbness or paresthesias. He has BLE neuropathy 2/2 his uncontrolled diabetes. He has intermittent incontinence per his wife. She reports neither his gait or incontinence have worsened since the fall.      Post-Op Info:  * No surgery found *         Interval History: NAEON. Pending brace delivery and standing films.     Medications:  Continuous Infusions:   Scheduled Meds:   aspirin  81 mg Oral Daily    atenolol  50 mg Oral Daily    atorvastatin  40 mg Oral Daily    diltiaZEM  240 mg Oral Daily    insulin aspart  10 Units Subcutaneous TIDWM    insulin detemir  20 Units Subcutaneous QHS    levETIRAcetam  500 mg Oral BID    predniSONE  10 mg Oral Daily    sertraline  150 mg Oral Daily    [START ON 11/22/2017] sulfamethoxazole-trimethoprim 800-160mg  1 tablet Oral Every Mon, Wed, Fri    valsartan  320 mg Oral Daily    vitamin D  5,000 Units Oral Daily     PRN Meds:dextrose 50%, dextrose 50%, glucagon (human recombinant), glucose, glucose, ondansetron, sodium chloride 0.9%     Review of Systems  Objective:     Weight: 115.7 kg (255 lb)  Body mass index is 35.57 kg/m².  Vital Signs (Most Recent):  Temp: 98.3 °F (36.8 °C) (11/21/17 1234)  Pulse: 94 (11/21/17 1234)  Resp: 18 (11/21/17 1234)  BP: (!) 144/74 (11/21/17 1234)  SpO2:  (!) 94 % (11/21/17 1234) Vital Signs (24h Range):  Temp:  [97.7 °F (36.5 °C)-99.2 °F (37.3 °C)] 98.3 °F (36.8 °C)  Pulse:  [73-94] 94  Resp:  [16-19] 18  SpO2:  [94 %-98 %] 94 %  BP: (131-190)/(68-98) 144/74                           Neurosurgery Physical Exam  General: well developed, well nourished, no distress.   Head: normocephalic, atraumatic  Neurologic: Alert and oriented. Mild confusion   GCS: Motor: 6/Verbal: 5/Eyes: 4 GCS Total: 15  Mental Status: Awake, Alert, Oriented x3  Cranial nerves: face symmetric, tongue midline, CN II-XII grossly intact.   Eyes: pupils equal, round, reactive to light with accomodation, EOMI.   Sensory: intact to light touch throughout  Motor Strength: Moves all extremities spontaneously with good tone.  Full strength upper and lower extremities.      Strength   Deltoids Triceps Biceps Wrist Extension Wrist Flexion Hand    Upper: R 5/5 5/5 5/5 5/5 5/5 5/5     L 5/5 5/5 5/5 5/5 5/5 5/5       Iliopsoas Quadriceps Knee  Flexion Tibialis  anterior Gastro- cnemius EHL   Lower: R 4/5 5/5 5/5 5/5 5/5 5/5     L 5/5 5/5 5/5 5/5 5/5 5/5      DTR's - 2 + and symmetric in UE and LE  Pronator Drift: no drift noted  Finger-to-nose: Intact bilaterally  Agarwal: absent  Clonus: absent  Babinski: absent  Pulses: 2+ and symmetric radial and dorsalis pedis. No lower extremity edema    Significant Labs:    Recent Labs  Lab 11/20/17  1502 11/21/17  0501   * 90    142   K 4.1 4.5    105   CO2 22* 28   BUN 27* 19   CREATININE 0.9 0.8   CALCIUM 9.0 9.9   MG  --  1.6       Recent Labs  Lab 11/20/17  1502 11/21/17  0501   WBC 7.66 5.66   HGB 9.4* 9.0*   HCT 26.7* 25.8*    152       Recent Labs  Lab 11/20/17  1502   INR 1.0   APTT 21.5     Microbiology Results (last 7 days)     ** No results found for the last 168 hours. **        Significant Diagnostics:  Standing xrays pending.    Assessment/Plan:     L4 vertebral fracture    57 y.o. male with CNS vasculitis and MS with L4  fracture s/p fall last week.    -Patient neurologically stable on exam.  -No acute neurosurgical intervention.  -Recommend LSO brace and standing Xrays on the lumbar spine in the brace.  -If xrays are stable, will plan to see the patient in Dr. Jaffe's clinic in 4 weeks with repeat Xrays.   -q4h neuro checks            Puja Carmichael PA-C  Neurosurgery  [facility]

## 2017-11-22 PROBLEM — S32.041A: Status: ACTIVE | Noted: 2017-11-22

## 2017-11-22 LAB
ALBUMIN SERPL BCP-MCNC: 3.2 G/DL
ALP SERPL-CCNC: 67 U/L
ALT SERPL W/O P-5'-P-CCNC: 19 U/L
ANION GAP SERPL CALC-SCNC: 8 MMOL/L
AST SERPL-CCNC: 20 U/L
BASOPHILS # BLD AUTO: 0.01 K/UL
BASOPHILS NFR BLD: 0.2 %
BILIRUB SERPL-MCNC: 0.8 MG/DL
BUN SERPL-MCNC: 22 MG/DL
CALCIUM SERPL-MCNC: 9.4 MG/DL
CHLORIDE SERPL-SCNC: 103 MMOL/L
CO2 SERPL-SCNC: 28 MMOL/L
CREAT SERPL-MCNC: 0.9 MG/DL
DIFFERENTIAL METHOD: ABNORMAL
EOSINOPHIL # BLD AUTO: 0.1 K/UL
EOSINOPHIL NFR BLD: 0.9 %
ERYTHROCYTE [DISTWIDTH] IN BLOOD BY AUTOMATED COUNT: 15.1 %
EST. GFR  (AFRICAN AMERICAN): >60 ML/MIN/1.73 M^2
EST. GFR  (NON AFRICAN AMERICAN): >60 ML/MIN/1.73 M^2
GLUCOSE SERPL-MCNC: 129 MG/DL
HCT VFR BLD AUTO: 28 %
HGB BLD-MCNC: 9.8 G/DL
IMM GRANULOCYTES # BLD AUTO: 0.03 K/UL
IMM GRANULOCYTES NFR BLD AUTO: 0.5 %
LYMPHOCYTES # BLD AUTO: 1.5 K/UL
LYMPHOCYTES NFR BLD: 23.4 %
MAGNESIUM SERPL-MCNC: 1.6 MG/DL
MCH RBC QN AUTO: 29.7 PG
MCHC RBC AUTO-ENTMCNC: 35 G/DL
MCV RBC AUTO: 85 FL
MONOCYTES # BLD AUTO: 0.4 K/UL
MONOCYTES NFR BLD: 6.1 %
NEUTROPHILS # BLD AUTO: 4.5 K/UL
NEUTROPHILS NFR BLD: 68.9 %
NRBC BLD-RTO: 0 /100 WBC
PHOSPHATE SERPL-MCNC: 4.2 MG/DL
PLATELET # BLD AUTO: 184 K/UL
PMV BLD AUTO: 9.3 FL
POCT GLUCOSE: 119 MG/DL (ref 70–110)
POCT GLUCOSE: 140 MG/DL (ref 70–110)
POCT GLUCOSE: 152 MG/DL (ref 70–110)
POCT GLUCOSE: 325 MG/DL (ref 70–110)
POTASSIUM SERPL-SCNC: 4.4 MMOL/L
PROT SERPL-MCNC: 6.5 G/DL
RBC # BLD AUTO: 3.3 M/UL
SODIUM SERPL-SCNC: 139 MMOL/L
WBC # BLD AUTO: 6.57 K/UL

## 2017-11-22 PROCEDURE — 97530 THERAPEUTIC ACTIVITIES: CPT

## 2017-11-22 PROCEDURE — 85025 COMPLETE CBC W/AUTO DIFF WBC: CPT

## 2017-11-22 PROCEDURE — 25000003 PHARM REV CODE 250: Performed by: HOSPITALIST

## 2017-11-22 PROCEDURE — 36415 COLL VENOUS BLD VENIPUNCTURE: CPT

## 2017-11-22 PROCEDURE — G0378 HOSPITAL OBSERVATION PER HR: HCPCS

## 2017-11-22 PROCEDURE — 99233 SBSQ HOSP IP/OBS HIGH 50: CPT | Mod: ,,, | Performed by: INTERNAL MEDICINE

## 2017-11-22 PROCEDURE — 97163 PT EVAL HIGH COMPLEX 45 MIN: CPT

## 2017-11-22 PROCEDURE — 63600175 PHARM REV CODE 636 W HCPCS: Performed by: HOSPITALIST

## 2017-11-22 PROCEDURE — 99233 SBSQ HOSP IP/OBS HIGH 50: CPT | Mod: ,,, | Performed by: NEUROLOGICAL SURGERY

## 2017-11-22 PROCEDURE — 97166 OT EVAL MOD COMPLEX 45 MIN: CPT

## 2017-11-22 PROCEDURE — 20600001 HC STEP DOWN PRIVATE ROOM

## 2017-11-22 PROCEDURE — 84100 ASSAY OF PHOSPHORUS: CPT

## 2017-11-22 PROCEDURE — 80053 COMPREHEN METABOLIC PANEL: CPT

## 2017-11-22 PROCEDURE — 83735 ASSAY OF MAGNESIUM: CPT

## 2017-11-22 RX ADMIN — DILTIAZEM HYDROCHLORIDE 240 MG: 240 CAPSULE, COATED, EXTENDED RELEASE ORAL at 08:11

## 2017-11-22 RX ADMIN — ATORVASTATIN CALCIUM 40 MG: 20 TABLET, FILM COATED ORAL at 08:11

## 2017-11-22 RX ADMIN — INSULIN DETEMIR 20 UNITS: 100 INJECTION, SOLUTION SUBCUTANEOUS at 10:11

## 2017-11-22 RX ADMIN — ATENOLOL 50 MG: 50 TABLET ORAL at 08:11

## 2017-11-22 RX ADMIN — INSULIN ASPART 10 UNITS: 100 INJECTION, SOLUTION INTRAVENOUS; SUBCUTANEOUS at 05:11

## 2017-11-22 RX ADMIN — SERTRALINE 150 MG: 100 TABLET, FILM COATED ORAL at 08:11

## 2017-11-22 RX ADMIN — PREDNISONE 10 MG: 10 TABLET ORAL at 08:11

## 2017-11-22 RX ADMIN — ASPIRIN 81 MG: 81 TABLET, COATED ORAL at 08:11

## 2017-11-22 RX ADMIN — INSULIN ASPART 10 UNITS: 100 INJECTION, SOLUTION INTRAVENOUS; SUBCUTANEOUS at 11:11

## 2017-11-22 RX ADMIN — LEVETIRACETAM 500 MG: 500 TABLET ORAL at 08:11

## 2017-11-22 RX ADMIN — VALSARTAN 320 MG: 160 TABLET ORAL at 08:11

## 2017-11-22 RX ADMIN — SULFAMETHOXAZOLE AND TRIMETHOPRIM 1 TABLET: 800; 160 TABLET ORAL at 08:11

## 2017-11-22 RX ADMIN — LEVETIRACETAM 500 MG: 500 TABLET ORAL at 10:11

## 2017-11-22 RX ADMIN — INSULIN ASPART 10 UNITS: 100 INJECTION, SOLUTION INTRAVENOUS; SUBCUTANEOUS at 08:11

## 2017-11-22 RX ADMIN — VITAMIN D, TAB 1000IU (100/BT) 5000 UNITS: 25 TAB at 08:11

## 2017-11-22 NOTE — NURSING
LSO brace maintained, delay due to not knowing that a follow up call must be placed to DME once brace has been ordered. Pt does not c/o pain at any point during my shift. Wife consistently at bedside. Urinal in use by patient. No neuro changes.

## 2017-11-22 NOTE — PROGRESS NOTES
"[facility] Hospital Medicine  Progress Note    Patient Name: Bessy Nunez  MRN: [id number]  Patient Class: IP- Inpatient   Admission Date: 11/20/2017  Length of Stay: 2 days  Attending Physician: Braydon Lowe MD  Primary Care Provider: Edgar Nova MD    Hospital Medicine Team: [facility] Braydon Lowe MD    Subjective:     Principal Problem:Closed compression fracture of L4 lumbar vertebra    HPI:  No notes on file    Hospital Course:  No notes on file    Prescriptions Prior to Admission   Medication Sig Dispense Refill Last Dose    aspirin (ECOTRIN) 81 MG EC tablet Take 81 mg by mouth once daily.   11/20/2017    atenolol (TENORMIN) 50 MG tablet Take 1 tablet (50 mg total) by mouth once daily. 90 tablet 3 11/20/2017    atorvastatin (LIPITOR) 40 MG tablet Take 1 tablet (40 mg total) by mouth once daily. 90 tablet 3 11/20/2017    blood sugar diagnostic Strp To check BG 4 times daily, to use with insurance preferred meter 400 each 3 11/20/2017    blood-glucose meter kit To check BG 4 times daily, one touch verio meter. Dx code e11.65 1 each 0 11/20/2017    diltiaZEM (DILACOR XR) 240 MG CDCR Take 1 capsule (240 mg total) by mouth once daily. 90 capsule 3 11/20/2017    insulin glargine (BASAGLAR KWIKPEN) 100 unit/mL (3 mL) InPn pen Inject 32 Units into the skin 2 (two) times daily. Once in the morning at 9 am and once in the evening at 9 pm. 2 Box 2 11/20/2017    insulin lispro (HUMALOG) 100 unit/mL injection Inject 20 units before meals 2 vial 3 11/20/2017    insulin needles, disposable, (BD ULTRA-FINE ANA PEN NEEDLES) 32 x 5/32 " Ndle Inject twice daily 100 each 3 11/20/2017    insulin syringe-needle U-100 (INSULIN SYRINGE) 1/2 mL 30 gauge x 5/16 Syrg Use 3x/day 300 each 3 11/20/2017    lancets Misc To check BG 4 times daily, one touch verio meter. Dx code e11.65 400 each 3 11/20/2017    levetiracetam (KEPPRA) 500 MG Tab Take 1 tablet (500 mg total) by mouth 2 (two) times " daily. 60 tablet 2 11/20/2017    liraglutide 0.6 mg/0.1 mL, 18 mg/3 mL, subq PNIJ (VICTOZA 3-TOMASZ) 0.6 mg/0.1 mL (18 mg/3 mL) PnIj Inject 1.8 mg into the skin once daily. 9 mL 3 11/20/2017    metformin (GLUCOPHAGE-XR) 500 MG 24 hr tablet Take 2 tablets (1,000 mg total) by mouth 2 (two) times daily with meals. 360 tablet 1 11/20/2017    omega-3 fatty acids-vitamin E (FISH OIL) 1,000 mg Cap Take 1 capsule by mouth 2 (two) times daily.  0 11/20/2017    ONETOUCH DELICA LANCETS 33 gauge Misc    11/20/2017    predniSONE (DELTASONE) 10 MG tablet Take 1 tablet (10 mg total) by mouth once daily. 30 tablet 1 11/20/2017    ranitidine (ZANTAC) 150 MG tablet Take 1 tablet (150 mg total) by mouth 2 (two) times daily. 60 tablet 11 11/20/2017    sertraline (ZOLOFT) 100 MG tablet 1 and half tablet daily (Patient taking differently: Take 150 mg by mouth once daily. ) 135 tablet 3 11/20/2017    sulfamethoxazole-trimethoprim 800-160mg (BACTRIM DS) 800-160 mg Tab Take every Monday, Wednesday, and Friday.  Infectious Disease will decide if this is needed in 3-4 months. 12 tablet 3 11/20/2017    valsartan (DIOVAN) 320 MG tablet Take 1 tablet (320 mg total) by mouth once daily. 90 tablet 3 11/20/2017    vitamin D 1000 units Tab Take 5 tablets (5,000 Units total) by mouth once daily.   11/20/2017    calcium carbonate (OS-DONNA) 500 mg calcium (1,250 mg) tablet Take 2 tablets (1,000 mg total) by mouth once.  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 Love By mri.  Several active lesions, many old. 5/13: MRA brain/neck, MRI brain w/wo contrast show no vascular occlusion, multiple foci of hyperintensity in deep cerebral periventricular white matter in pattern of demyelinating process.  5/17: MRI spine performed, no spinal cord lesions. Hospitalization 6/2017. EEG was performed negative for seizures.  Repeat MRI showing  new lesion, question whether this was demyelination versus CVA. 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 Findings compatible with a small acute lacunar infarct adjacent to the left frontal horn.  Nonspecific enhancement just inferior to this region and extending into the left basal ganglia, as well as within the inferior aspect of the left cerebellum.  No evidence of a focal mass. 2.  Extensive increased signal intensity involving the periventricular white matter compatible with demyelinating disease versus vasculitis. 3.  Clinical correlation and followup recommended. 4.  This reportedly flattened in the EPIC and the corpus callosum medical record system.    Mixed hyperlipidemia 11/9/2012    NAFLD (nonalcoholic fatty liver disease) 10/11/2013    CHASE (nonalcoholic steatohepatitis)     Obesity     Stroke     Type 2 diabetes mellitus with diabetic polyneuropathy, with long-term current use of insulin 5/14/2017    Type 2 diabetes mellitus with hyperglycemia, with long-term current use of insulin 10/11/2013     Past Surgical History:   Procedure Laterality Date    SKIN CANCER EXCISION       Family History     Problem Relation (Age of Onset)    Cancer Father    Diabetes Maternal Aunt    Heart disease Mother    Heart failure Mother    Hypertension Mother        Social History Main Topics    Smoking status: Never Smoker    Smokeless tobacco: Never Used    Alcohol use No    Drug use: No    Sexual activity: Not on file     Review of Systems   Constitutional: Negative for activity change, diaphoresis, fatigue, fever and unexpected weight change.   Eyes: Negative for visual disturbance.   Respiratory: Negative for cough, shortness of breath and wheezing.    Cardiovascular: Negative for chest pain  and palpitations.   Gastrointestinal: Negative for abdominal distention, abdominal pain, blood in stool, constipation, diarrhea, nausea and vomiting.   Genitourinary: Negative for difficulty urinating, enuresis, frequency and urgency.   Musculoskeletal: Positive for back pain. Negative for gait problem, joint swelling, myalgias, neck pain and neck stiffness.   Skin: Negative for color change, rash and wound.   Neurological: Positive for weakness. Negative for dizziness, seizures, facial asymmetry, speech difficulty, light-headedness, numbness and headaches.   Hematological: Does not bruise/bleed easily.   Psychiatric/Behavioral: Negative for agitation, behavioral problems, dysphoric mood and hallucinations. The patient is not nervous/anxious.      Objective:     Weight: 115.7 kg (255 lb)  Body mass index is 35.57 kg/m².  Vital Signs (Most Recent):  Temp: 97.3 °F (36.3 °C) (11/22/17 0742)  Pulse: 79 (11/22/17 0742)  Resp: 17 (11/22/17 0742)  BP: 117/67 (11/22/17 0742)  SpO2: 96 % (11/22/17 0742) Vital Signs (24h Range):  Temp:  [97.3 °F (36.3 °C)-98.3 °F (36.8 °C)] 97.3 °F (36.3 °C)  Pulse:  [79-94] 79  Resp:  [17-18] 17  SpO2:  [92 %-96 %] 96 %  BP: (109-167)/(57-96) 117/67          Neurosurgery Physical Exam  General: well developed, well nourished, no distress.   Head: normocephalic, atraumatic  Neurologic: Alert and oriented. Mild confusion   GCS: Motor: 6/Verbal: 5/Eyes: 4 GCS Total: 15  Mental Status: Awake, Alert, Oriented x3  Cranial nerves: face symmetric, tongue midline, CN II-XII grossly intact.   Eyes: pupils equal, round, reactive to light with accomodation, EOMI.   Sensory: intact to light touch throughout  Motor Strength: Moves all extremities spontaneously with good tone.  Full strength upper and lower extremities.     Strength  Deltoids Triceps Biceps Wrist Extension Wrist Flexion Hand    Upper: R 5/5 5/5 5/5 5/5 5/5 5/5    L 5/5 5/5 5/5 5/5 5/5 5/5     Iliopsoas Quadriceps Knee  Flexion  Tibialis  anterior Gastro- cnemius EHL   Lower: R 4/5 5/5 5/5 5/5 5/5 5/5    L 5/5 5/5 5/5 5/5 5/5 5/5     DTR's - 2 + and symmetric in UE and LE  Pronator Drift: no drift noted  Finger-to-nose: Intact bilaterally  Agarwal: absent  Clonus: absent  Babinski: absent  Pulses: 2+ and symmetric radial and dorsalis pedis. No lower extremity edema      Significant Labs:    Recent Labs  Lab 11/20/17  1502 11/21/17  0501 11/22/17  0637   * 90 129*    142 139   K 4.1 4.5 4.4    105 103   CO2 22* 28 28   BUN 27* 19 22*   CREATININE 0.9 0.8 0.9   CALCIUM 9.0 9.9 9.4   MG  --  1.6 1.6       Recent Labs  Lab 11/20/17  1502 11/21/17  0501 11/22/17  0637   WBC 7.66 5.66 6.57   HGB 9.4* 9.0* 9.8*   HCT 26.7* 25.8* 28.0*    152 184       Recent Labs  Lab 11/20/17  1502   INR 1.0   APTT 21.5     Microbiology Results (last 7 days)     ** No results found for the last 168 hours. **        All pertinent labs from the last 24 hours have been reviewed.    Significant Diagnostics:  MRI lumbar spine 11/20 reviewed. Acute burst type fracture of the L4 vertebra with fracture line extending to the posterior cortex of the L4 vertebra.  There is loss of central vertebral body height of the superior end plate of approximately 40%. No retropulsion.     Physical Exam      Assessment/Plan:      * Closed compression fracture of L4 lumbar vertebra    No intervention p[er NS  Plan on SNF or rehab admit  Pian in reasonable control  Back brace            VTE Risk Mitigation         Ordered     Medium Risk of VTE  Once      11/20/17 1935     Place ELIZABETH hose  Until discontinued      11/20/17 1935     Place sequential compression device  Until discontinued      11/20/17 1935     Place sequential compression device  Until discontinued      11/20/17 1639              Braydon Lowe MD  Department of Hospital Medicine   [facility]

## 2017-11-22 NOTE — PT/OT/SLP EVAL
Occupational Therapy  Evaluation/ Treatment    Bessy Nunez   MRN: [id number]   Admitting Diagnosis: Closed compression fracture of L4 lumbar vertebra    OT Date of Treatment: 11/22/17   OT Start Time: 0956  OT Stop Time: 1024  OT Total Time (min): 28 min    Billable Minutes:  Evaluation 10 minutes  Therapeutic Activity 8 minutes    Diagnosis: Closed compression fracture of L4 lumbar vertebra   Decreased vision (R peripheral < L peripheral); decreased balance, coordination, ROM    Past Medical History:   Diagnosis Date    CNS vasculitis 6/2/2017    Follows with Dr. Love By mri.  Several active lesions, many old. 5/13: MRA brain/neck, MRI brain w/wo contrast show no vascular occlusion, multiple foci of hyperintensity in deep cerebral periventricular white matter in pattern of demyelinating process.  5/17: MRI spine performed, no spinal cord lesions. Hospitalization 6/2017. EEG was performed negative for seizures.  Repeat MRI showing new lesion, question whether this was demyelination versus CVA. 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 Findings compatible with a small acute lacunar infarct adjacent to the left frontal horn.  Nonspecific enhancement just inferior to this region and extending into the left basal ganglia, as well as within the inferior aspect of the left cerebellum.  No evidence of a focal mass. 2.  Extensive increased signal intensity involving the periventricular white matter compatible with demyelinating disease versus vasculitis. 3.  Clinical correlation and followup recommended. 4.  This reportedly flattened in the EPIC and the corpus callosum medical record system.    Mixed hyperlipidemia 11/9/2012    NAFLD (nonalcoholic fatty liver disease) 10/11/2013    CHASE (nonalcoholic  steatohepatitis)     Obesity     Stroke     Type 2 diabetes mellitus with diabetic polyneuropathy, with long-term current use of insulin 5/14/2017    Type 2 diabetes mellitus with hyperglycemia, with long-term current use of insulin 10/11/2013      Past Surgical History:   Procedure Laterality Date    SKIN CANCER EXCISION         Referring physician: UMANG Lowe  Date referred to OT: 11/22/2017    General Precautions: Standard, fall  Orthopedic Precautions:  (spinal)  Braces: LSO    Do you have any cultural, spiritual, Bahai conflicts, given your current situation?: none stated     Patient History:  Living Environment  Lives With: spouse  Living Arrangements: house  Home Accessibility:  (TH to enter)  Transportation Available: family or friend will provide  Living Environment Comment: Pt lives with wife in a 1SH with TH to enter; has a walk-in shower with seat; pt reports multiple falls and is unclear re the reason/cause. pt was (I) in ADLs and mobility, but has a Rollator, BSC, and SPC. Wife is available to assist, but unable to assist with transfers.  Equipment Currently Used at Home: walker, rolling, shower chair, cane, straight    Prior level of function:   Bed Mobility/Transfers: independent  Grooming: independent  Bathing: independent  Upper Body Dressing: independent  Lower Body Dressing: independent  Toileting: independent  Home Management Skills: independent  Homemaking Responsibilities: Yes  Mode of Transportation: Family     Dominant hand: right    Subjective:  Communicated with RN prior to session.    Chief Complaint: none  Patient/Family stated goals: get better    Pain/Comfort  Pain Rating 1: 0/10  Pain Addressed 1: Cessation of Activity (pt reports 4/10 pain during movement)  Pain Rating Post-Intervention 1: 0/10    Objective:  Patient found with:  (LSO already donned), pt found sitting EOB and agreeable to therapy.    Cognitive Exam:  Oriented to: Person, Place, Time and Situation  Follows  Commands/attention: Follows multistep commands, but inattentive  Communication: clear/fluent  Memory:  No Deficits noted  Safety awareness/insight to disability: impaired  Coping skills/emotional control: Appropriate to situation    Visual/perceptual:  R peripheral view impaired to nearly only central vision; L impaired ~50%.    Physical Exam:  Postural examination/scapula alignment: No postural abnormalities identified  Skin integrity: Visible skin intact  Edema: None noted     Sensation:   Intact    Upper Extremity Range of Motion:  Right Upper Extremity: WFL  Left Upper Extremity: WFL    Upper Extremity Strength:  Right Upper Extremity: WFL  Left Upper Extremity: WFL   Strength: Good    Fine motor coordination:   Intact    Gross motor coordination: WFL    Functional Mobility:  Bed Mobility:   NT    Transfers:  Sit <> Stand Assistance: Stand By Assistance  Sit <> Stand Assistive Device: No Assistive Device  Bed <> Chair Technique: Stand Pivot  Bed <> Chair Transfer Assistance: Contact Guard Assistance  Bed <> Chair Assistive Device: No Assistive Device    Functional Ambulation: CGA with no AD; Min A for LOB    Activities of Daily Living:    UE Dressing Level of Assistance: Supervision (gown as ciara, EOB)  LE Dressing Level of Assistance: Maximum assistance (socks)    Balance:   Static Sit: NORMAL: No deviations seen in posture held statically  Dynamic Sit: GOOD: Maintains balance through MODERATE excursions of active trunk movement  Static Stand: FAIR: Maintains without assist but unable to take challenges  Dynamic stand: POOR: N/A    Therapeutic Activities and Exercises:  Pt ed re OT role and POC. Pt sitting EOB upon entry. Pt performed strength and ROM testing. Pt requiring Max A for LBD, but able to don gown as robe with Supervision (UBD). Pt performed sit to stand t/f with SBA and ambulated with CGA. Pt required Min A for LOB. Pt tolerated coordination and vision tests. Pt stood again with SBA and  "ambulated with SBA and slight LOB to chair. Pt sat in chair, then stood and walked to the EOB. Pt repeated, and sat in chair with SBA.    AM-PAC 6 CLICK ADL  How much help from another person does this patient currently need?  1 = Unable, Total/Dependent Assistance  2 = A lot, Maximum/Moderate Assistance  3 = A little, Minimum/Contact Guard/Supervision  4 = None, Modified [place]/Independent    Putting on and taking off regular lower body clothing? : 2  Bathing (including washing, rinsing, drying)?: 2  Toileting, which includes using toilet, bedpan, or urinal? : 3  Putting on and taking off regular upper body clothing?: 3  Taking care of personal grooming such as brushing teeth?: 4  Eating meals?: 4  Total Score: 18    AM-PAC Raw Score CMS "G-Code Modifier Level of Impairment Assistance   6 % Total / Unable   7 - 9 CM 80 - 100% Maximal Assist   10-14 CL 60 - 80% Moderate Assist   15 - 19 CK 40 - 60% Moderate Assist   20 - 22 CJ 20 - 40% Minimal Assist   23 CI 1-20% SBA / CGA   24 CH 0% Independent/ Mod I       Patient left up in chair with all lines intact, call button in reach and RN notified    Assessment:  Bessy Nunez is a 57 y.o. male with a medical diagnosis of Closed compression fracture of L4 lumbar vertebra and presents with the impairments listed below. Pt is inattentive, but participates well. Pt requiring increased assistance for LBD, and likely req assistance for bathing lower body areas. Pt is able to t/f with SBA/CGA, but demo impaired balance during ambulation. Pt also presents with decreased peripheral vision, worst on the R side, to which he tends to lose balance. Pt would benefit from cont OT improve balance and safety with self care tasks.    Rehab identified problem list/impairments: Rehab identified problem list/impairments: impaired self care skills, gait instability, decreased lower extremity function, visual deficits, decreased coordination, pain, orthopedic " precautions    Rehab potential is good.    Activity tolerance: Good    Discharge recommendations: Discharge Facility/Level Of Care Needs: home with home health (HHPT to focus on balance deficits; OT to focus on safety and DME for self care)     Barriers to discharge: Barriers to Discharge: Decreased caregiver support    Equipment recommendations: none     GOALS:    Occupational Therapy Goals        Problem: Occupational Therapy Goal    Goal Priority Disciplines Outcome Interventions   Occupational Therapy Goal     OT, PT/OT Ongoing (interventions implemented as appropriate)    Description:  Goals to be met by: 12/5/17     Patient will increase functional independence with ADLs by performing:    UE Dressing with Supervision, including LSO  LE Dressing with Stand-by Assistance and Assistive Devices as needed.  Grooming while standing at sink with Stand-by Assistance.  Toileting from toilet with Supervision for hygiene and clothing management.   Supine to sit with Modified [place].  Stand pivot transfers with Stand-by Assistance.  Toilet transfer to toilet with Stand-by Assistance.                      PLAN:  Patient to be seen 3 x/week to address the above listed problems via self-care/home management, therapeutic activities, therapeutic exercises  Plan of Care expires: 12/22/17  Plan of Care reviewed with: patient    COLTON Charlton  11/22/2017  Pager: [phone]

## 2017-11-22 NOTE — PLAN OF CARE
Problem: Patient Care Overview  Goal: Plan of Care Review  Outcome: Ongoing (interventions implemented as appropriate)  Pt vss. Pt remained free from falls. Call light and personal items in reach. Pt had a CT w/o contrast of lumbar/spine. Pt is wearing back brace.

## 2017-11-22 NOTE — PLAN OF CARE
Problem: Occupational Therapy Goal  Goal: Occupational Therapy Goal  Goals to be met by: 12/5/17     Patient will increase functional independence with ADLs by performing:    UE Dressing with Supervision, including LSO  LE Dressing with Stand-by Assistance and Assistive Devices as needed.  Grooming while standing at sink with Stand-by Assistance.  Toileting from toilet with Supervision for hygiene and clothing management.   Supine to sit with Modified [place].  Stand pivot transfers with Stand-by Assistance.  Toilet transfer to toilet with Stand-by Assistance.    Outcome: Ongoing (interventions implemented as appropriate)  OT eval completed. The above goals are established to improve functional (I) and mobility.    COLTON Charlton  11/22/2017  Pager: [phone]

## 2017-11-22 NOTE — ASSESSMENT & PLAN NOTE
57 y.o. male with CNS vasculitis and MS with L4 fracture s/p fall last week.    -Patient neurologically stable on exam. Xrays reviewed  -No acute neurosurgical intervention.  -Recommend LSO brace when up or OOB  -Plan to see the patient in Dr. Jaffe's clinic in 4 weeks with repeat Xrays. Appointment will be mailed to the patient   -NSGY will sign off today. Please call with any further questions.

## 2017-11-22 NOTE — PLAN OF CARE
Problem: Patient Care Overview  Goal: Plan of Care Review  Outcome: Ongoing (interventions implemented as appropriate)  Daily update.    Comments: Vitals stable, no c/o pain at this time.

## 2017-11-22 NOTE — PROGRESS NOTES
[facility]  Neurosurgery  Progress Note    Subjective:     History of Present Illness: Bessy Nunez is a 57 y.o. male with history of CNS vasculitis, MS on HD CTX, CHASE, DMII, lacunar stroke 9/2017 and HTN who presented to the ED referred by his neurologist for evaluation of L4 fracture. The patient has baseline gait instability and fell backwards one week ago when walking into his house. He struck his back on a brick ledge which caused severe localized low back pain. He went to the ED that night where xrays were reportedly negative. He saw his neurologist today who ordered an MRI and sent the patient for neurosurgical evaluation. He denies radicular leg pain, new numbness or paresthesias. He has BLE neuropathy 2/2 his uncontrolled diabetes. He has intermittent incontinence per his wife. She reports neither his gait or incontinence have worsened since the fall.      Post-Op Info:  * No surgery found *         Interval History: NAEON. Xrays reviewed. Patient without new complaints today. Reports moderate back pain without LE numbness/paresthesias/weakness/pain.    Medications:  Continuous Infusions:   Scheduled Meds:   aspirin  81 mg Oral Daily    atenolol  50 mg Oral Daily    atorvastatin  40 mg Oral Daily    diltiaZEM  240 mg Oral Daily    insulin aspart  10 Units Subcutaneous TIDWM    insulin detemir  20 Units Subcutaneous QHS    levETIRAcetam  500 mg Oral BID    predniSONE  10 mg Oral Daily    sertraline  150 mg Oral Daily    sulfamethoxazole-trimethoprim 800-160mg  1 tablet Oral Every Mon, Wed, Fri    valsartan  320 mg Oral Daily    vitamin D  5,000 Units Oral Daily     PRN Meds:dextrose 50%, dextrose 50%, glucagon (human recombinant), glucose, glucose, ondansetron, sodium chloride 0.9%     Review of Systems    Objective:     Weight: 115.7 kg (255 lb)  Body mass index is 35.57 kg/m².  Vital Signs (Most Recent):  Temp: 98.5 °F (36.9 °C) (11/22/17 1605)  Pulse: 86 (11/22/17  1605)  Resp: 16 (11/22/17 1605)  BP: 122/61 (11/22/17 1605)  SpO2: (!) 92 % (11/22/17 1605) Vital Signs (24h Range):  Temp:  [97.3 °F (36.3 °C)-98.5 °F (36.9 °C)] 98.5 °F (36.9 °C)  Pulse:  [79-90] 86  Resp:  [16-18] 16  SpO2:  [92 %-96 %] 92 %  BP: (102-167)/(57-96) 122/61        Neurosurgery Physical Exam    General: well developed, well nourished, no distress.   Head: normocephalic, atraumatic  Neurologic: Alert and oriented. Mild confusion   GCS: Motor: 6/Verbal: 5/Eyes: 4 GCS Total: 15  Mental Status: Awake, Alert, Oriented x3  Cranial nerves: face symmetric, tongue midline, CN II-XII grossly intact.   Eyes: pupils equal, round, reactive to light with accomodation, EOMI.   Sensory: intact to light touch throughout  Motor Strength: Moves all extremities spontaneously with good tone.  Full strength upper and lower extremities.      Strength   Deltoids Triceps Biceps Wrist Extension Wrist Flexion Hand    Upper: R 5/5 5/5 5/5 5/5 5/5 5/5     L 5/5 5/5 5/5 5/5 5/5 5/5       Iliopsoas Quadriceps Knee  Flexion Tibialis  anterior Gastro- cnemius EHL   Lower: R 4/5 5/5 5/5 5/5 5/5 5/5     L 5/5 5/5 5/5 5/5 5/5 5/5      DTR's - 2 + and symmetric in UE and LE  Pronator Drift: no drift noted  Finger-to-nose: Intact bilaterally  Agarwal: absent  Clonus: absent  Babinski: absent  Pulses: 2+ and symmetric radial and dorsalis pedis. No lower extremity edema    Significant Labs:    Recent Labs  Lab 11/21/17  0501 11/22/17  0637   GLU 90 129*    139   K 4.5 4.4    103   CO2 28 28   BUN 19 22*   CREATININE 0.8 0.9   CALCIUM 9.9 9.4   MG 1.6 1.6       Recent Labs  Lab 11/21/17  0501 11/22/17  0637   WBC 5.66 6.57   HGB 9.0* 9.8*   HCT 25.8* 28.0*    184     No results for input(s): LABPT, INR, APTT in the last 48 hours.  Microbiology Results (last 7 days)     ** No results found for the last 168 hours. **        Significant Diagnostics  Xrays lumbar spine reviewed. L4 fracture stable    Assessment/Plan:      L4 vertebral fracture    57 y.o. male with CNS vasculitis and MS with L4 fracture s/p fall last week.    -Patient neurologically stable on exam. Xrays reviewed  -No acute neurosurgical intervention.  -Recommend LSO brace when up or OOB  -Plan to see the patient in Dr. Jaffe's clinic in 4 weeks with repeat Xrays. Appointment will be mailed to the patient   -NSGY will sign off today. Please call with any further questions.               Linda Grey PA-C  Neurosurgery  [facility]

## 2017-11-22 NOTE — PLAN OF CARE
Problem: Physical Therapy Goal  Goal: Physical Therapy Goal  Goals to be met by: 2017    Patient will increase functional independence with mobility by performin. Supine to sit with Modified [place]  2. Sit to supine with Modified [place]  3. Sit to stand transfer with Mod I  4. Bed to chair transfer with Modified [place]  5. Gait  x 150 feet with Supervision using Rolling Walker or appropriate AD.  6. Improve EDGE score to 28/56 to improve safety and overall functional mobility.  7. Improve Tinetti score to 17/28 to improve safety and overall functional mobility.     Outcome: Ongoing (interventions implemented as appropriate)  Evaluation complete.    DC rec's Home with HHPT.    Hai Henson III, DPT, PT  2017

## 2017-11-22 NOTE — SUBJECTIVE & OBJECTIVE
Interval History: NAEON. Xrays reviewed. Patient without new complaints today. Reports moderate back pain without LE numbness/paresthesias/weakness/pain.    Medications:  Continuous Infusions:   Scheduled Meds:   aspirin  81 mg Oral Daily    atenolol  50 mg Oral Daily    atorvastatin  40 mg Oral Daily    diltiaZEM  240 mg Oral Daily    insulin aspart  10 Units Subcutaneous TIDWM    insulin detemir  20 Units Subcutaneous QHS    levETIRAcetam  500 mg Oral BID    predniSONE  10 mg Oral Daily    sertraline  150 mg Oral Daily    sulfamethoxazole-trimethoprim 800-160mg  1 tablet Oral Every Mon, Wed, Fri    valsartan  320 mg Oral Daily    vitamin D  5,000 Units Oral Daily     PRN Meds:dextrose 50%, dextrose 50%, glucagon (human recombinant), glucose, glucose, ondansetron, sodium chloride 0.9%     Review of Systems    Objective:     Weight: 115.7 kg (255 lb)  Body mass index is 35.57 kg/m².  Vital Signs (Most Recent):  Temp: 98.5 °F (36.9 °C) (11/22/17 1605)  Pulse: 86 (11/22/17 1605)  Resp: 16 (11/22/17 1605)  BP: 122/61 (11/22/17 1605)  SpO2: (!) 92 % (11/22/17 1605) Vital Signs (24h Range):  Temp:  [97.3 °F (36.3 °C)-98.5 °F (36.9 °C)] 98.5 °F (36.9 °C)  Pulse:  [79-90] 86  Resp:  [16-18] 16  SpO2:  [92 %-96 %] 92 %  BP: (102-167)/(57-96) 122/61        Neurosurgery Physical Exam    General: well developed, well nourished, no distress.   Head: normocephalic, atraumatic  Neurologic: Alert and oriented. Mild confusion   GCS: Motor: 6/Verbal: 5/Eyes: 4 GCS Total: 15  Mental Status: Awake, Alert, Oriented x3  Cranial nerves: face symmetric, tongue midline, CN II-XII grossly intact.   Eyes: pupils equal, round, reactive to light with accomodation, EOMI.   Sensory: intact to light touch throughout  Motor Strength: Moves all extremities spontaneously with good tone.  Full strength upper and lower extremities.      Strength   Deltoids Triceps Biceps Wrist Extension Wrist Flexion Hand    Upper: R 5/5 5/5 5/5 5/5  5/5 5/5     L 5/5 5/5 5/5 5/5 5/5 5/5       Iliopsoas Quadriceps Knee  Flexion Tibialis  anterior Gastro- cnemius EHL   Lower: R 4/5 5/5 5/5 5/5 5/5 5/5     L 5/5 5/5 5/5 5/5 5/5 5/5      DTR's - 2 + and symmetric in UE and LE  Pronator Drift: no drift noted  Finger-to-nose: Intact bilaterally  Agarwal: absent  Clonus: absent  Babinski: absent  Pulses: 2+ and symmetric radial and dorsalis pedis. No lower extremity edema    Significant Labs:    Recent Labs  Lab 11/21/17  0501 11/22/17  0637   GLU 90 129*    139   K 4.5 4.4    103   CO2 28 28   BUN 19 22*   CREATININE 0.8 0.9   CALCIUM 9.9 9.4   MG 1.6 1.6       Recent Labs  Lab 11/21/17  0501 11/22/17  0637   WBC 5.66 6.57   HGB 9.0* 9.8*   HCT 25.8* 28.0*    184     No results for input(s): LABPT, INR, APTT in the last 48 hours.  Microbiology Results (last 7 days)     ** No results found for the last 168 hours. **        Significant Diagnostics  Xrays lumbar spine reviewed. L4 fracture stable

## 2017-11-22 NOTE — PT/OT/SLP EVAL
Physical Therapy  Evaluation/Treat    Bessy Nunez   MRN: [id number]   Admitting Diagnosis: Closed compression fracture of L4 lumbar vertebra    PT Received On: 11/22/17  PT Start Time: 0956     PT Stop Time: 1023    PT Total Time (min): 27 min       Billable Minutes:  Evaluation 19 and Therapeutic Activity 8    Diagnosis: Closed compression fracture of L4 lumbar vertebra  LSO for EOB/OOB activity  Multiple recent falls with unknown cause per patient    Past Medical History:   Diagnosis Date    CNS vasculitis 6/2/2017    Follows with Dr. Love By mri.  Several active lesions, many old. 5/13: MRA brain/neck, MRI brain w/wo contrast show no vascular occlusion, multiple foci of hyperintensity in deep cerebral periventricular white matter in pattern of demyelinating process.  5/17: MRI spine performed, no spinal cord lesions. Hospitalization 6/2017. EEG was performed negative for seizures.  Repeat MRI showing new lesion, question whether this was demyelination versus CVA. 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 Findings compatible with a small acute lacunar infarct adjacent to the left frontal horn.  Nonspecific enhancement just inferior to this region and extending into the left basal ganglia, as well as within the inferior aspect of the left cerebellum.  No evidence of a focal mass. 2.  Extensive increased signal intensity involving the periventricular white matter compatible with demyelinating disease versus vasculitis. 3.  Clinical correlation and followup recommended. 4.  This reportedly flattened in the EPIC and the corpus callosum medical record system.    Mixed hyperlipidemia 11/9/2012    NAFLD (nonalcoholic fatty liver disease) 10/11/2013    CHASE (nonalcoholic steatohepatitis)     Obesity      Stroke     Type 2 diabetes mellitus with diabetic polyneuropathy, with long-term current use of insulin 5/14/2017    Type 2 diabetes mellitus with hyperglycemia, with long-term current use of insulin 10/11/2013      Past Surgical History:   Procedure Laterality Date    SKIN CANCER EXCISION         Referring physician: Mynor  Date referred to PT: 11/21/2017    General Precautions: Standard, fall  Orthopedic Precautions: N/A   Braces: LSO       Do you have any cultural, spiritual, Bahai conflicts, given your current situation?: none stated    Patient History:  Lives With: spouse  Living Arrangements: house  Home Accessibility:  (1 threshold step to enter)  Transportation Available: family or friend will provide  Living Environment Comment: Patient lives with spouse in 1-story home with 1 threshold step to enter. Multiple falls with patient unsure of reason for falling. Indep PTA with mobility and ADLs per report. Wife available to assist PRN; however, during last fall wife unable to get patient up off floor.  Equipment Currently Used at Home: walker, rolling, cane, straight, shower chair  DME owned (not currently used): rolling walker and single point cane    Previous Level of Function:  Ambulation Skills: independent  Transfer Skills: independent  ADL Skills: independent  Work/Leisure Activity: independent (driving not working)    Subjective:  Communicated with RN prior to session.  Patient agreeable to PT session. Reports unknown cause of falls. Asking to walk off floor  Chief Complaint: falls  Patient goals: return home    Pain/Comfort  Pain Rating 1: 0/10  Pain Rating Post-Intervention 1: 0/10      Objective:   Patient found with:  (LSO donned)     Cognitive Exam:  Oriented to: Person, Place, Time and Situation    Follows Commands/attention: Easily distracted/inattentive  Communication: clear/fluent/flat affect  Safety awareness/insight to disability: impaired    Physical Exam:  Postural  examination/scapula alignment: Rounded shoulder and Head forward    Skin integrity: Visible skin intact  Edema: None noted     Sensation:   Intact to light touch BLE    Lower Extremity Range of Motion:  Right Lower Extremity: WFL  Left Lower Extremity: WFL    Lower Extremity Strength:  Right Lower Extremity: WFL  Left Lower Extremity: WFL     Fine motor coordination:  Impaired  RLE heel shin      Gross motor coordination: impaired secondary to impaired static/dynamic balance    Functional Mobility:  Bed Mobility: not observed; patient seated EOB looking out window upon room entry     Transfers:  Sit <> Stand Assistance: Contact Guard Assistance (x2)  Sit <> Stand Assistive Device: No Assistive Device  Bed <> Chair Technique: Stand Pivot (x2)  Bed <> Chair Assistance: Stand By Assistance  Bed <> Chair Assistive Device: No Assistive Device    Gait:   Gait Distance: [id number]  Assistance 1: Contact Guard Assistance  Gait Assistive Device: No device  Gait Pattern: reciprocal  Gait Deviation(s): decreased stride length, decreased step length, decreased weight-shifting ability, foot flat    Balance:   Static Sit: NORMAL: No deviations seen in posture held statically  Dynamic Sit: GOOD-: Maintains balance through MODERATE excursions of active trunk movement,     Static Stand: FAIR: Maintains without assist but unable to take challenges  Dynamic stand: FAIR: Needs CONTACT GUARD during gait    Therapeutic Activities and Exercises:  Co-evaluation with OT.    Patient educated on:   - role of PT/POC   - log roll technique   - back precautions: no bending, lifting, or twisting   - wearing LSO for EOB/OOB activity   - high fall risk secondary to multiple falls and poor performance with balance testing   - calling RN/PCT staff to be present prior to mobilizing in room.    Verbalized understanding of all education provided; however, secondary flat affect and inattention difficult to assess education recall.    Decreased peripheral  vision with confrontation testing R>L  Impaired heel to shin R>L    EDGE BALANCE SCALE     1.SITTING TO STANDING:  (3) Pt able to stand independently using hands     2. STANDING UNSUPPORTED: (3) Pt able to stand 2 minutes with supervision     3. SITTING WITH BACK UNSUPPORTED BUT FEET SUPPORTED ON FLOOR: (4) Pt able to sit safely and securely 2 minutes     4. STANDING TO SITTING:(3) Pt controls descent by using hands     5. TRANSFERS:(2) Pt able to transfer with verbal cueing and/or supervision    6. STANDING UNSUPPORTED WITH EYES CLOSED:(0) Pt needs help to keep from falling    7. STANDING UNSUPPORTED WITH FEET TOGETHER:(1) Pt needs help to attain position but able to stand 15 seconds feet together    8. REACHING FORWARD WITH OUTSTRETCHED ARM WHILE STANDING:(2) Pt can reach forward >5 cm safely (2 inches)    9.  OBJECT FROM THE FLOOR FROM A STANDING POSITION:(1) Pt unable to  and needs supervision while trying    10. TURNING TO LOOK BEHIND OVER LEFT AND RIGHT SHOULDERS WHILE STANDING:(1) Pt needs supervision when turning    11. TURN 360 DEGREES:(1) Pt needs close supervision or verbal cueing    12. PLACING ALTERNATE FOOT ON STEP OR STOOL WHILE STANDING UNSUPPORTED:(0) Pt needs assistance to keep from falling/unable to try    13. STANDING UNSUPPORTED ONE FOOT IN FRONT: (1) Pt aneeds help to step but can hold 15 seconds     14. STANDING ON ONE LEG:(1) Pt tries to lift leg unable to hold 3 seconds but remains standing independently    Total Score 23/56  21-40 = medium fall risk    TINETTI BALANCE ASSESSMENT TOOL    Tinetti ME, Stas TF, Maycathleen R, Fall Risk Index for elderly patients based on number of chronic disabilities.Am J Med [id number]    Assistive Device  Normally Used: No  Assistive Device During Testing: No     BALANCE SECTION  Patient is seated in hard, armless chair;    1.Sitting Balance:   Leans or slides in chair = 0  2.Rises from chair :  Able, uses arms to help = 1  3.Attempts to  arise :  Able to arise, 1 attempt = 2  4.Immediate standing Balance (first 5 seconds) : Steady without walker or other support = 2  5.Standing balance: Unsteady = 0  6.Nudged : Begins to fall = 0  7.Eyes closed: Unsteady = 0  8.Turning 360 degrees:  Unsteady (grabs, staggers) = 0  9.Sitting Down : Uses arms or not a smooth motion = 1    Balance Score:  6/16    GAIT SECTION  Patient stands with therapist, walks across room (+/- aids), first at usual pace, then at rapid pace.    10.Initiation of Gait (Immediately after told to go.): No hesitancy = 1  11.Step length and height :  Step through R=1  12.Foot Clearance :  L foot clears floor=1   13.Step symmetry: Right & Left step length appear equal = 1  14.Step continuity : Steps appear continuous = 1  15.Path: Mild/moderate deviation or uses walking aid = 1  16.Trunk : Marked sway or uses walking aid = 0  17.Walking Time: Heels apart = 0    Gait Score: 6/12  Balance score:6/16  Total Score=Balance + Gait Score: 12/28     Risk Indicators: High fall risk    AM-PAC 6 CLICK MOBILITY  How much help from another person does this patient currently need?   1 = Unable, Total/Dependent Assistance  2 = A lot, Maximum/Moderate Assistance  3 = A little, Minimum/Contact Guard/Supervision  4 = None, Modified [place]/Independent    Turning over in bed (including adjusting bedclothes, sheets and blankets)?: 4  Sitting down on and standing up from a chair with arms (e.g., wheelchair, bedside commode, etc.): 3  Moving from lying on back to sitting on the side of the bed?: 3  Moving to and from a bed to a chair (including a wheelchair)?: 3  Need to walk in hospital room?: 3  Climbing 3-5 steps with a railing?: 3  Total Score: 19     AM-PAC Raw Score CMS G-Code Modifier Level of Impairment Assistance   6 % Total / Unable   7 - 9 CM 80 - 100% Maximal Assist   10 - 14 CL 60 - 80% Moderate Assist   15 - 19 CK 40 - 60% Moderate Assist   20 - 22 CJ 20 - 40% Minimal Assist   23 CI  1-20% SBA / CGA   24 CH 0% Independent/ Mod I     Patient left up in chair with all lines intact, call button in reach, chair alarm on, RN notified and OT present.    Assessment:   Bessy Nunez is a 57 y.o. male with a medical diagnosis of Closed compression fracture of L4 lumbar vertebra and presents with rehab identified impairments listed below. Flat affect during interview. Inattentive/distracted during ambulation in [place] without device. Minor gait instability noted with ambulation with one LOB to R with patient bouncing off wall and R-rail. Decreased LE coordination and peripheral vision R>L. Significant impaired static standing balance evidenced by poor EDGE and Tinetti scores. Per EDGE and Tinetti results patient moderate-high fall risk. To benefit from continued skilled intervention with an emphasis on balance prior to transition home with HHPT to improve safety and overall functional mobility to decrease fall risk.    History: personal factors and/or comorbidities that impact the plan of care: Closed compression fracture of L4 lumbar vertebra; LSO for EOB/OOB activity, multiple falls, high fall risk, CNS vasculitis; Lacunar stroke  Evaluation of Body Systems: cardiovascular/pulmonary, integumentary, musculoskeletal, neuromuscular, cognition/communication  Functional Outcome Tools: AMPAC, ROM, MMT  Clinical Presentation: unstable    Evaluation Complexity Level: High    Rehab identified problem list/impairments: Rehab identified problem list/impairments: impaired functional mobilty, gait instability, impaired balance, decreased lower extremity function, decreased safety awareness    Rehab potential is fair.    Activity tolerance: Good    Discharge recommendations: Discharge Facility/Level Of Care Needs: home health PT (with an emphasis on static/dynamic balance training)     Barriers to discharge: Barriers to Discharge: Decreased caregiver support (patient requiring increased assistance at this time to  ensure safety)    Equipment recommendations: Equipment Needed After Discharge: none     GOALS:    Physical Therapy Goals     Not on file                PLAN:    Patient to be seen 3 x/week to address the above listed problems via gait training, therapeutic activities, therapeutic exercises  Plan of Care expires: 12/22/17  Plan of Care reviewed with: patient          Hai MOHINI Sixto SOMERS, PT  11/22/2017

## 2017-11-22 NOTE — SUBJECTIVE & OBJECTIVE
"Prescriptions Prior to Admission   Medication Sig Dispense Refill Last Dose    aspirin (ECOTRIN) 81 MG EC tablet Take 81 mg by mouth once daily.   11/20/2017    atenolol (TENORMIN) 50 MG tablet Take 1 tablet (50 mg total) by mouth once daily. 90 tablet 3 11/20/2017    atorvastatin (LIPITOR) 40 MG tablet Take 1 tablet (40 mg total) by mouth once daily. 90 tablet 3 11/20/2017    blood sugar diagnostic Strp To check BG 4 times daily, to use with insurance preferred meter 400 each 3 11/20/2017    blood-glucose meter kit To check BG 4 times daily, one touch verio meter. Dx code e11.65 1 each 0 11/20/2017    diltiaZEM (DILACOR XR) 240 MG CDCR Take 1 capsule (240 mg total) by mouth once daily. 90 capsule 3 11/20/2017    insulin glargine (BASAGLAR KWIKPEN) 100 unit/mL (3 mL) InPn pen Inject 32 Units into the skin 2 (two) times daily. Once in the morning at 9 am and once in the evening at 9 pm. 2 Box 2 11/20/2017    insulin lispro (HUMALOG) 100 unit/mL injection Inject 20 units before meals 2 vial 3 11/20/2017    insulin needles, disposable, (BD ULTRA-FINE ANA PEN NEEDLES) 32 x 5/32 " Ndle Inject twice daily 100 each 3 11/20/2017    insulin syringe-needle U-100 (INSULIN SYRINGE) 1/2 mL 30 gauge x 5/16 Syrg Use 3x/day 300 each 3 11/20/2017    lancets Misc To check BG 4 times daily, one touch verio meter. Dx code e11.65 400 each 3 11/20/2017    levetiracetam (KEPPRA) 500 MG Tab Take 1 tablet (500 mg total) by mouth 2 (two) times daily. 60 tablet 2 11/20/2017    liraglutide 0.6 mg/0.1 mL, 18 mg/3 mL, subq PNIJ (VICTOZA 3-TOMASZ) 0.6 mg/0.1 mL (18 mg/3 mL) PnIj Inject 1.8 mg into the skin once daily. 9 mL 3 11/20/2017    metformin (GLUCOPHAGE-XR) 500 MG 24 hr tablet Take 2 tablets (1,000 mg total) by mouth 2 (two) times daily with meals. 360 tablet 1 11/20/2017    omega-3 fatty acids-vitamin E (FISH OIL) 1,000 mg Cap Take 1 capsule by mouth 2 (two) times daily.  0 11/20/2017    ONETOUCH DELICA LANCETS 33 gauge Misc "    11/20/2017    predniSONE (DELTASONE) 10 MG tablet Take 1 tablet (10 mg total) by mouth once daily. 30 tablet 1 11/20/2017    ranitidine (ZANTAC) 150 MG tablet Take 1 tablet (150 mg total) by mouth 2 (two) times daily. 60 tablet 11 11/20/2017    sertraline (ZOLOFT) 100 MG tablet 1 and half tablet daily (Patient taking differently: Take 150 mg by mouth once daily. ) 135 tablet 3 11/20/2017    sulfamethoxazole-trimethoprim 800-160mg (BACTRIM DS) 800-160 mg Tab Take every Monday, Wednesday, and Friday.  Infectious Disease will decide if this is needed in 3-4 months. 12 tablet 3 11/20/2017    valsartan (DIOVAN) 320 MG tablet Take 1 tablet (320 mg total) by mouth once daily. 90 tablet 3 11/20/2017    vitamin D 1000 units Tab Take 5 tablets (5,000 Units total) by mouth once daily.   11/20/2017    calcium carbonate (OS-DONNA) 500 mg calcium (1,250 mg) tablet Take 2 tablets (1,000 mg total) by mouth once.  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 Love By mri.  Several active lesions, many old. 5/13: MRA brain/neck, MRI brain w/wo contrast show no vascular occlusion, multiple foci of hyperintensity in deep cerebral periventricular white matter in pattern of demyelinating process.  5/17: MRI spine performed, no spinal cord lesions. Hospitalization 6/2017. EEG was performed negative for seizures.  Repeat MRI showing new lesion, question whether this was demyelination versus CVA. 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 Findings compatible with a small acute lacunar infarct adjacent to the left frontal horn.  Nonspecific enhancement just inferior to this region and extending into the  left basal ganglia, as well as within the inferior aspect of the left cerebellum.  No evidence of a focal mass. 2.  Extensive increased signal intensity involving the periventricular white matter compatible with demyelinating disease versus vasculitis. 3.  Clinical correlation and followup recommended. 4.  This reportedly flattened in the EPIC and the corpus callosum medical record system.    Mixed hyperlipidemia 11/9/2012    NAFLD (nonalcoholic fatty liver disease) 10/11/2013    CHASE (nonalcoholic steatohepatitis)     Obesity     Stroke     Type 2 diabetes mellitus with diabetic polyneuropathy, with long-term current use of insulin 5/14/2017    Type 2 diabetes mellitus with hyperglycemia, with long-term current use of insulin 10/11/2013     Past Surgical History:   Procedure Laterality Date    SKIN CANCER EXCISION       Family History     Problem Relation (Age of Onset)    Cancer Father    Diabetes Maternal Aunt    Heart disease Mother    Heart failure Mother    Hypertension Mother        Social History Main Topics    Smoking status: Never Smoker    Smokeless tobacco: Never Used    Alcohol use No    Drug use: No    Sexual activity: Not on file     Review of Systems   Constitutional: Negative for activity change, diaphoresis, fatigue, fever and unexpected weight change.   Eyes: Negative for visual disturbance.   Respiratory: Negative for cough, shortness of breath and wheezing.    Cardiovascular: Negative for chest pain and palpitations.   Gastrointestinal: Negative for abdominal distention, abdominal pain, blood in stool, constipation, diarrhea, nausea and vomiting.   Genitourinary: Negative for difficulty urinating, enuresis, frequency and urgency.   Musculoskeletal: Positive for back pain. Negative for gait problem, joint swelling, myalgias, neck pain and neck stiffness.   Skin: Negative for color change, rash and wound.   Neurological: Positive for weakness. Negative for dizziness, seizures, facial  asymmetry, speech difficulty, light-headedness, numbness and headaches.   Hematological: Does not bruise/bleed easily.   Psychiatric/Behavioral: Negative for agitation, behavioral problems, dysphoric mood and hallucinations. The patient is not nervous/anxious.      Objective:     Weight: 115.7 kg (255 lb)  Body mass index is 35.57 kg/m².  Vital Signs (Most Recent):  Temp: 97.3 °F (36.3 °C) (11/22/17 0742)  Pulse: 79 (11/22/17 0742)  Resp: 17 (11/22/17 0742)  BP: 117/67 (11/22/17 0742)  SpO2: 96 % (11/22/17 0742) Vital Signs (24h Range):  Temp:  [97.3 °F (36.3 °C)-98.3 °F (36.8 °C)] 97.3 °F (36.3 °C)  Pulse:  [79-94] 79  Resp:  [17-18] 17  SpO2:  [92 %-96 %] 96 %  BP: (109-167)/(57-96) 117/67          Neurosurgery Physical Exam  General: well developed, well nourished, no distress.   Head: normocephalic, atraumatic  Neurologic: Alert and oriented. Mild confusion   GCS: Motor: 6/Verbal: 5/Eyes: 4 GCS Total: 15  Mental Status: Awake, Alert, Oriented x3  Cranial nerves: face symmetric, tongue midline, CN II-XII grossly intact.   Eyes: pupils equal, round, reactive to light with accomodation, EOMI.   Sensory: intact to light touch throughout  Motor Strength: Moves all extremities spontaneously with good tone.  Full strength upper and lower extremities.     Strength  Deltoids Triceps Biceps Wrist Extension Wrist Flexion Hand    Upper: R 5/5 5/5 5/5 5/5 5/5 5/5    L 5/5 5/5 5/5 5/5 5/5 5/5     Iliopsoas Quadriceps Knee  Flexion Tibialis  anterior Gastro- cnemius EHL   Lower: R 4/5 5/5 5/5 5/5 5/5 5/5    L 5/5 5/5 5/5 5/5 5/5 5/5     DTR's - 2 + and symmetric in UE and LE  Pronator Drift: no drift noted  Finger-to-nose: Intact bilaterally  Agarwal: absent  Clonus: absent  Babinski: absent  Pulses: 2+ and symmetric radial and dorsalis pedis. No lower extremity edema      Significant Labs:    Recent Labs  Lab 11/20/17  1502 11/21/17  0501 11/22/17  0637   * 90 129*    142 139   K 4.1 4.5 4.4    105 103    CO2 22* 28 28   BUN 27* 19 22*   CREATININE 0.9 0.8 0.9   CALCIUM 9.0 9.9 9.4   MG  --  1.6 1.6       Recent Labs  Lab 11/20/17  1502 11/21/17  0501 11/22/17  0637   WBC 7.66 5.66 6.57   HGB 9.4* 9.0* 9.8*   HCT 26.7* 25.8* 28.0*    152 184       Recent Labs  Lab 11/20/17  1502   INR 1.0   APTT 21.5     Microbiology Results (last 7 days)     ** No results found for the last 168 hours. **        All pertinent labs from the last 24 hours have been reviewed.    Significant Diagnostics:  MRI lumbar spine 11/20 reviewed. Acute burst type fracture of the L4 vertebra with fracture line extending to the posterior cortex of the L4 vertebra.  There is loss of central vertebral body height of the superior end plate of approximately 40%. No retropulsion.     Physical Exam

## 2017-11-23 LAB
ALBUMIN SERPL BCP-MCNC: 3.1 G/DL
ALP SERPL-CCNC: 60 U/L
ALT SERPL W/O P-5'-P-CCNC: 19 U/L
ANION GAP SERPL CALC-SCNC: 8 MMOL/L
AST SERPL-CCNC: 18 U/L
BASOPHILS # BLD AUTO: 0.01 K/UL
BASOPHILS NFR BLD: 0.2 %
BILIRUB SERPL-MCNC: 0.6 MG/DL
BUN SERPL-MCNC: 26 MG/DL
CALCIUM SERPL-MCNC: 8.8 MG/DL
CHLORIDE SERPL-SCNC: 109 MMOL/L
CO2 SERPL-SCNC: 25 MMOL/L
CREAT SERPL-MCNC: 1 MG/DL
DIFFERENTIAL METHOD: ABNORMAL
EOSINOPHIL # BLD AUTO: 0.1 K/UL
EOSINOPHIL NFR BLD: 1.4 %
ERYTHROCYTE [DISTWIDTH] IN BLOOD BY AUTOMATED COUNT: 15.1 %
EST. GFR  (AFRICAN AMERICAN): >60 ML/MIN/1.73 M^2
EST. GFR  (NON AFRICAN AMERICAN): >60 ML/MIN/1.73 M^2
GLUCOSE SERPL-MCNC: 100 MG/DL
HCT VFR BLD AUTO: 25.8 %
HGB BLD-MCNC: 9 G/DL
IMM GRANULOCYTES # BLD AUTO: 0.04 K/UL
IMM GRANULOCYTES NFR BLD AUTO: 0.6 %
LYMPHOCYTES # BLD AUTO: 1.4 K/UL
LYMPHOCYTES NFR BLD: 22.4 %
MAGNESIUM SERPL-MCNC: 1.9 MG/DL
MCH RBC QN AUTO: 29.8 PG
MCHC RBC AUTO-ENTMCNC: 34.9 G/DL
MCV RBC AUTO: 85 FL
MONOCYTES # BLD AUTO: 0.5 K/UL
MONOCYTES NFR BLD: 7.1 %
NEUTROPHILS # BLD AUTO: 4.3 K/UL
NEUTROPHILS NFR BLD: 68.3 %
NRBC BLD-RTO: 0 /100 WBC
PHOSPHATE SERPL-MCNC: 3.5 MG/DL
PLATELET # BLD AUTO: 166 K/UL
PMV BLD AUTO: 8.6 FL
POCT GLUCOSE: 112 MG/DL (ref 70–110)
POCT GLUCOSE: 191 MG/DL (ref 70–110)
POCT GLUCOSE: 194 MG/DL (ref 70–110)
POCT GLUCOSE: 230 MG/DL (ref 70–110)
POCT GLUCOSE: 313 MG/DL (ref 70–110)
POCT GLUCOSE: 335 MG/DL (ref 70–110)
POTASSIUM SERPL-SCNC: 4.1 MMOL/L
PROT SERPL-MCNC: 6.1 G/DL
RBC # BLD AUTO: 3.02 M/UL
SODIUM SERPL-SCNC: 142 MMOL/L
WBC # BLD AUTO: 6.3 K/UL

## 2017-11-23 PROCEDURE — 84100 ASSAY OF PHOSPHORUS: CPT

## 2017-11-23 PROCEDURE — 85025 COMPLETE CBC W/AUTO DIFF WBC: CPT

## 2017-11-23 PROCEDURE — 20600001 HC STEP DOWN PRIVATE ROOM

## 2017-11-23 PROCEDURE — G0378 HOSPITAL OBSERVATION PER HR: HCPCS

## 2017-11-23 PROCEDURE — 25000003 PHARM REV CODE 250: Performed by: HOSPITALIST

## 2017-11-23 PROCEDURE — 83735 ASSAY OF MAGNESIUM: CPT

## 2017-11-23 PROCEDURE — 63600175 PHARM REV CODE 636 W HCPCS: Performed by: HOSPITALIST

## 2017-11-23 PROCEDURE — 80053 COMPREHEN METABOLIC PANEL: CPT

## 2017-11-23 PROCEDURE — 99233 SBSQ HOSP IP/OBS HIGH 50: CPT | Mod: ,,, | Performed by: INTERNAL MEDICINE

## 2017-11-23 PROCEDURE — 36415 COLL VENOUS BLD VENIPUNCTURE: CPT

## 2017-11-23 RX ADMIN — INSULIN ASPART 10 UNITS: 100 INJECTION, SOLUTION INTRAVENOUS; SUBCUTANEOUS at 09:11

## 2017-11-23 RX ADMIN — ATORVASTATIN CALCIUM 40 MG: 20 TABLET, FILM COATED ORAL at 09:11

## 2017-11-23 RX ADMIN — ASPIRIN 81 MG: 81 TABLET, COATED ORAL at 09:11

## 2017-11-23 RX ADMIN — PREDNISONE 10 MG: 10 TABLET ORAL at 09:11

## 2017-11-23 RX ADMIN — INSULIN ASPART 10 UNITS: 100 INJECTION, SOLUTION INTRAVENOUS; SUBCUTANEOUS at 11:11

## 2017-11-23 RX ADMIN — INSULIN ASPART 10 UNITS: 100 INJECTION, SOLUTION INTRAVENOUS; SUBCUTANEOUS at 04:11

## 2017-11-23 RX ADMIN — VITAMIN D, TAB 1000IU (100/BT) 5000 UNITS: 25 TAB at 09:11

## 2017-11-23 RX ADMIN — VALSARTAN 320 MG: 160 TABLET ORAL at 09:11

## 2017-11-23 RX ADMIN — INSULIN DETEMIR 20 UNITS: 100 INJECTION, SOLUTION SUBCUTANEOUS at 08:11

## 2017-11-23 RX ADMIN — LEVETIRACETAM 500 MG: 500 TABLET ORAL at 09:11

## 2017-11-23 RX ADMIN — DILTIAZEM HYDROCHLORIDE 240 MG: 240 CAPSULE, COATED, EXTENDED RELEASE ORAL at 09:11

## 2017-11-23 RX ADMIN — LEVETIRACETAM 500 MG: 500 TABLET ORAL at 08:11

## 2017-11-23 RX ADMIN — ATENOLOL 50 MG: 50 TABLET ORAL at 09:11

## 2017-11-23 RX ADMIN — SERTRALINE 150 MG: 100 TABLET, FILM COATED ORAL at 09:11

## 2017-11-23 NOTE — PLAN OF CARE
Problem: Patient Care Overview  Goal: Plan of Care Review  Outcome: Ongoing (interventions implemented as appropriate)  Daily update    Comments: Pt received Xray today.Brace being worn as ordered, even when in bed(pt preference). No changes

## 2017-11-23 NOTE — PROGRESS NOTES
"[facility] Hospital Medicine  Progress Note    Patient Name: Bessy Nunez  MRN: [id number]  Patient Class: IP- Inpatient   Admission Date: 11/20/2017  Length of Stay: 3 days  Attending Physician: Braydon Lowe MD  Primary Care Provider: Edgar Nova MD    Hospital Medicine Team: [facility] Braydon Lowe MD    Subjective:     Principal Problem:Closed compression fracture of L4 lumbar vertebra    HPI:  No notes on file    Hospital Course:  No notes on file    Prescriptions Prior to Admission   Medication Sig Dispense Refill Last Dose    aspirin (ECOTRIN) 81 MG EC tablet Take 81 mg by mouth once daily.   11/20/2017    atenolol (TENORMIN) 50 MG tablet Take 1 tablet (50 mg total) by mouth once daily. 90 tablet 3 11/20/2017    atorvastatin (LIPITOR) 40 MG tablet Take 1 tablet (40 mg total) by mouth once daily. 90 tablet 3 11/20/2017    blood sugar diagnostic Strp To check BG 4 times daily, to use with insurance preferred meter 400 each 3 11/20/2017    blood-glucose meter kit To check BG 4 times daily, one touch verio meter. Dx code e11.65 1 each 0 11/20/2017    diltiaZEM (DILACOR XR) 240 MG CDCR Take 1 capsule (240 mg total) by mouth once daily. 90 capsule 3 11/20/2017    insulin glargine (BASAGLAR KWIKPEN) 100 unit/mL (3 mL) InPn pen Inject 32 Units into the skin 2 (two) times daily. Once in the morning at 9 am and once in the evening at 9 pm. 2 Box 2 11/20/2017    insulin lispro (HUMALOG) 100 unit/mL injection Inject 20 units before meals 2 vial 3 11/20/2017    insulin needles, disposable, (BD ULTRA-FINE ANA PEN NEEDLES) 32 x 5/32 " Ndle Inject twice daily 100 each 3 11/20/2017    insulin syringe-needle U-100 (INSULIN SYRINGE) 1/2 mL 30 gauge x 5/16 Syrg Use 3x/day 300 each 3 11/20/2017    lancets Misc To check BG 4 times daily, one touch verio meter. Dx code e11.65 400 each 3 11/20/2017    levetiracetam (KEPPRA) 500 MG Tab Take 1 tablet (500 mg total) by mouth 2 (two) times " daily. 60 tablet 2 11/20/2017    liraglutide 0.6 mg/0.1 mL, 18 mg/3 mL, subq PNIJ (VICTOZA 3-TOMASZ) 0.6 mg/0.1 mL (18 mg/3 mL) PnIj Inject 1.8 mg into the skin once daily. 9 mL 3 11/20/2017    metformin (GLUCOPHAGE-XR) 500 MG 24 hr tablet Take 2 tablets (1,000 mg total) by mouth 2 (two) times daily with meals. 360 tablet 1 11/20/2017    omega-3 fatty acids-vitamin E (FISH OIL) 1,000 mg Cap Take 1 capsule by mouth 2 (two) times daily.  0 11/20/2017    ONETOUCH DELICA LANCETS 33 gauge Misc    11/20/2017    predniSONE (DELTASONE) 10 MG tablet Take 1 tablet (10 mg total) by mouth once daily. 30 tablet 1 11/20/2017    ranitidine (ZANTAC) 150 MG tablet Take 1 tablet (150 mg total) by mouth 2 (two) times daily. 60 tablet 11 11/20/2017    sertraline (ZOLOFT) 100 MG tablet 1 and half tablet daily (Patient taking differently: Take 150 mg by mouth once daily. ) 135 tablet 3 11/20/2017    sulfamethoxazole-trimethoprim 800-160mg (BACTRIM DS) 800-160 mg Tab Take every Monday, Wednesday, and Friday.  Infectious Disease will decide if this is needed in 3-4 months. 12 tablet 3 11/20/2017    valsartan (DIOVAN) 320 MG tablet Take 1 tablet (320 mg total) by mouth once daily. 90 tablet 3 11/20/2017    vitamin D 1000 units Tab Take 5 tablets (5,000 Units total) by mouth once daily.   11/20/2017    calcium carbonate (OS-DONNA) 500 mg calcium (1,250 mg) tablet Take 2 tablets (1,000 mg total) by mouth once.  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 Love By mri.  Several active lesions, many old. 5/13: MRA brain/neck, MRI brain w/wo contrast show no vascular occlusion, multiple foci of hyperintensity in deep cerebral periventricular white matter in pattern of demyelinating process.  5/17: MRI spine performed, no spinal cord lesions. Hospitalization 6/2017. EEG was performed negative for seizures.  Repeat MRI showing  new lesion, question whether this was demyelination versus CVA. 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 Findings compatible with a small acute lacunar infarct adjacent to the left frontal horn.  Nonspecific enhancement just inferior to this region and extending into the left basal ganglia, as well as within the inferior aspect of the left cerebellum.  No evidence of a focal mass. 2.  Extensive increased signal intensity involving the periventricular white matter compatible with demyelinating disease versus vasculitis. 3.  Clinical correlation and followup recommended. 4.  This reportedly flattened in the EPIC and the corpus callosum medical record system.    Mixed hyperlipidemia 11/9/2012    NAFLD (nonalcoholic fatty liver disease) 10/11/2013    CHASE (nonalcoholic steatohepatitis)     Obesity     Stroke     Type 2 diabetes mellitus with diabetic polyneuropathy, with long-term current use of insulin 5/14/2017    Type 2 diabetes mellitus with hyperglycemia, with long-term current use of insulin 10/11/2013     Past Surgical History:   Procedure Laterality Date    SKIN CANCER EXCISION       Family History     Problem Relation (Age of Onset)    Cancer Father    Diabetes Maternal Aunt    Heart disease Mother    Heart failure Mother    Hypertension Mother        Social History Main Topics    Smoking status: Never Smoker    Smokeless tobacco: Never Used    Alcohol use No    Drug use: No    Sexual activity: Not on file     Review of Systems   Constitutional: Negative for activity change, diaphoresis, fatigue, fever and unexpected weight change.   Eyes: Negative for visual disturbance.   Respiratory: Negative for cough, shortness of breath and wheezing.    Cardiovascular: Negative for chest pain  and palpitations.   Gastrointestinal: Negative for abdominal distention, abdominal pain, blood in stool, constipation, diarrhea, nausea and vomiting.   Genitourinary: Negative for difficulty urinating, enuresis, frequency and urgency.   Musculoskeletal: Positive for back pain. Negative for gait problem, joint swelling, myalgias, neck pain and neck stiffness.   Skin: Negative for color change, rash and wound.   Neurological: Positive for weakness. Negative for dizziness, seizures, facial asymmetry, speech difficulty, light-headedness, numbness and headaches.   Hematological: Does not bruise/bleed easily.   Psychiatric/Behavioral: Negative for agitation, behavioral problems, dysphoric mood and hallucinations. The patient is not nervous/anxious.      Objective:     Weight: 115.7 kg (255 lb 1.2 oz)  Body mass index is 35.59 kg/m².  Vital Signs (Most Recent):  Temp: 97.9 °F (36.6 °C) (11/23/17 1127)  Pulse: 79 (11/23/17 1127)  Resp: 16 (11/23/17 1127)  BP: (!) 112/59 (11/23/17 1127)  SpO2: (!) 94 % (11/23/17 1127) Vital Signs (24h Range):  Temp:  [97.5 °F (36.4 °C)-98.5 °F (36.9 °C)] 97.9 °F (36.6 °C)  Pulse:  [79-86] 79  Resp:  [16-18] 16  SpO2:  [92 %-98 %] 94 %  BP: (112-139)/(59-80) 112/59       Date 11/23/17 0700 - 11/24/17 0659   Shift 1292-9896 4113-9819 6185-8956 24 Hour Total   I  N  T  A  K  E   P.O. 240   240    Shift Total  (mL/kg) 240  (2.1)   240  (2.1)   O  U  T  P  U  T   Shift Total  (mL/kg)       Weight (kg) 115.7 115.7 115.7 115.7       Neurosurgery Physical Exam  General: well developed, well nourished, no distress.   Head: normocephalic, atraumatic  Neurologic: Alert and oriented. Mild confusion   GCS: Motor: 6/Verbal: 5/Eyes: 4 GCS Total: 15  Mental Status: Awake, Alert, Oriented x3  Cranial nerves: face symmetric, tongue midline, CN II-XII grossly intact.   Eyes: pupils equal, round, reactive to light with accomodation, EOMI.   Sensory: intact to light touch throughout  Motor Strength: Moves all  extremities spontaneously with good tone.  Full strength upper and lower extremities.     Strength  Deltoids Triceps Biceps Wrist Extension Wrist Flexion Hand    Upper: R 5/5 5/5 5/5 5/5 5/5 5/5    L 5/5 5/5 5/5 5/5 5/5 5/5     Iliopsoas Quadriceps Knee  Flexion Tibialis  anterior Gastro- cnemius EHL   Lower: R 4/5 5/5 5/5 5/5 5/5 5/5    L 5/5 5/5 5/5 5/5 5/5 5/5     DTR's - 2 + and symmetric in UE and LE  Pronator Drift: no drift noted  Finger-to-nose: Intact bilaterally  Agarwal: absent  Clonus: absent  Babinski: absent  Pulses: 2+ and symmetric radial and dorsalis pedis. No lower extremity edema      Significant Labs:    Recent Labs  Lab 11/22/17  0637 11/23/17  0449   * 100    142   K 4.4 4.1    109   CO2 28 25   BUN 22* 26*   CREATININE 0.9 1.0   CALCIUM 9.4 8.8   MG 1.6 1.9       Recent Labs  Lab 11/22/17  0637 11/23/17  0449   WBC 6.57 6.30   HGB 9.8* 9.0*   HCT 28.0* 25.8*    166     No results for input(s): LABPT, INR, APTT in the last 48 hours.  Microbiology Results (last 7 days)     ** No results found for the last 168 hours. **        All pertinent labs from the last 24 hours have been reviewed.    Significant Diagnostics:  MRI lumbar spine 11/20 reviewed. Acute burst type fracture of the L4 vertebra with fracture line extending to the posterior cortex of the L4 vertebra.  There is loss of central vertebral body height of the superior end plate of approximately 40%. No retropulsion.     Physical Exam      Assessment/Plan:      * Closed compression fracture of L4 lumbar vertebra    No intervention p[er NS  Plan on SNF or rehab admit  Pian in reasonable control  Back brace            VTE Risk Mitigation         Ordered     Medium Risk of VTE  Once      11/20/17 1935     Place ELIZABETH hose  Until discontinued      11/20/17 1935     Place sequential compression device  Until discontinued      11/20/17 1935     Place sequential compression device  Until discontinued      11/20/17  3721              Braydon Lowe MD  Department of [facility] Medicine   [facility]

## 2017-11-23 NOTE — PLAN OF CARE
Problem: Fall Risk (Adult)  Intervention: Reduce Risk/Promote Restraint Free Environment   17   Safety Interventions   Safety Precautions emergency equipment at bedside   Safety Interventions   Environmental Safety Modification clutter free environment maintained;lighting adjusted   Prevent Roca Drop/Fall   Safety/Security Measures bed alarm set;family to remain at bedside     Intervention: Review Medications/Identify Contributors to Fall Risk   17   Safety Interventions   Medication Review/Management medications reviewed     Intervention: Patient Rounds   17   Safety Interventions   Patient Rounds bed in low position;bed wheels locked;call light in reach;clutter free environment maintained;ID band on;placement of personal items at bedside;toileting offered;visualized patient     Intervention: Safety Promotion/Fall Prevention   17   Safety Interventions   Safety Promotion/Fall Prevention assistive device/personal item within reach;upper side rails raised x 2, lower siderails raised x 1 (Peds only);bed alarm set     Intervention: Safety Precautions   17   Safety Interventions   Safety Precautions emergency equipment at bedside       Goal: Absence of Falls  Patient will demonstrate the desired outcomes by discharge/transition of care.   Outcome: Ongoing (interventions implemented as appropriate)   17   Fall Risk (Adult)   Absence of Falls achieves outcome       Problem: Patient Care Overview  Goal: Plan of Care Review  Outcome: Ongoing (interventions implemented as appropriate)   17   Coping/Psychosocial   Plan Of Care Reviewed With patient     Assumed care of patient at 1900. Aox3. Flat affect. Wife at bedside most of the night. No complaints or concerns voiced. Patient slept through the night. VSS. Lungs clear. Patient continues to wear LSO brace. Neuro q 4, no changes noted. Patient educated on call light use. Bed in lowest position,  call light in reach. Will continue to monitor.

## 2017-11-23 NOTE — SUBJECTIVE & OBJECTIVE
"Prescriptions Prior to Admission   Medication Sig Dispense Refill Last Dose    aspirin (ECOTRIN) 81 MG EC tablet Take 81 mg by mouth once daily.   11/20/2017    atenolol (TENORMIN) 50 MG tablet Take 1 tablet (50 mg total) by mouth once daily. 90 tablet 3 11/20/2017    atorvastatin (LIPITOR) 40 MG tablet Take 1 tablet (40 mg total) by mouth once daily. 90 tablet 3 11/20/2017    blood sugar diagnostic Strp To check BG 4 times daily, to use with insurance preferred meter 400 each 3 11/20/2017    blood-glucose meter kit To check BG 4 times daily, one touch verio meter. Dx code e11.65 1 each 0 11/20/2017    diltiaZEM (DILACOR XR) 240 MG CDCR Take 1 capsule (240 mg total) by mouth once daily. 90 capsule 3 11/20/2017    insulin glargine (BASAGLAR KWIKPEN) 100 unit/mL (3 mL) InPn pen Inject 32 Units into the skin 2 (two) times daily. Once in the morning at 9 am and once in the evening at 9 pm. 2 Box 2 11/20/2017    insulin lispro (HUMALOG) 100 unit/mL injection Inject 20 units before meals 2 vial 3 11/20/2017    insulin needles, disposable, (BD ULTRA-FINE ANA PEN NEEDLES) 32 x 5/32 " Ndle Inject twice daily 100 each 3 11/20/2017    insulin syringe-needle U-100 (INSULIN SYRINGE) 1/2 mL 30 gauge x 5/16 Syrg Use 3x/day 300 each 3 11/20/2017    lancets Misc To check BG 4 times daily, one touch verio meter. Dx code e11.65 400 each 3 11/20/2017    levetiracetam (KEPPRA) 500 MG Tab Take 1 tablet (500 mg total) by mouth 2 (two) times daily. 60 tablet 2 11/20/2017    liraglutide 0.6 mg/0.1 mL, 18 mg/3 mL, subq PNIJ (VICTOZA 3-TOMASZ) 0.6 mg/0.1 mL (18 mg/3 mL) PnIj Inject 1.8 mg into the skin once daily. 9 mL 3 11/20/2017    metformin (GLUCOPHAGE-XR) 500 MG 24 hr tablet Take 2 tablets (1,000 mg total) by mouth 2 (two) times daily with meals. 360 tablet 1 11/20/2017    omega-3 fatty acids-vitamin E (FISH OIL) 1,000 mg Cap Take 1 capsule by mouth 2 (two) times daily.  0 11/20/2017    ONETOUCH DELICA LANCETS 33 gauge Misc "    11/20/2017    predniSONE (DELTASONE) 10 MG tablet Take 1 tablet (10 mg total) by mouth once daily. 30 tablet 1 11/20/2017    ranitidine (ZANTAC) 150 MG tablet Take 1 tablet (150 mg total) by mouth 2 (two) times daily. 60 tablet 11 11/20/2017    sertraline (ZOLOFT) 100 MG tablet 1 and half tablet daily (Patient taking differently: Take 150 mg by mouth once daily. ) 135 tablet 3 11/20/2017    sulfamethoxazole-trimethoprim 800-160mg (BACTRIM DS) 800-160 mg Tab Take every Monday, Wednesday, and Friday.  Infectious Disease will decide if this is needed in 3-4 months. 12 tablet 3 11/20/2017    valsartan (DIOVAN) 320 MG tablet Take 1 tablet (320 mg total) by mouth once daily. 90 tablet 3 11/20/2017    vitamin D 1000 units Tab Take 5 tablets (5,000 Units total) by mouth once daily.   11/20/2017    calcium carbonate (OS-DONNA) 500 mg calcium (1,250 mg) tablet Take 2 tablets (1,000 mg total) by mouth once.  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 Love By mri.  Several active lesions, many old. 5/13: MRA brain/neck, MRI brain w/wo contrast show no vascular occlusion, multiple foci of hyperintensity in deep cerebral periventricular white matter in pattern of demyelinating process.  5/17: MRI spine performed, no spinal cord lesions. Hospitalization 6/2017. EEG was performed negative for seizures.  Repeat MRI showing new lesion, question whether this was demyelination versus CVA. 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 Findings compatible with a small acute lacunar infarct adjacent to the left frontal horn.  Nonspecific enhancement just inferior to this region and extending into the  left basal ganglia, as well as within the inferior aspect of the left cerebellum.  No evidence of a focal mass. 2.  Extensive increased signal intensity involving the periventricular white matter compatible with demyelinating disease versus vasculitis. 3.  Clinical correlation and followup recommended. 4.  This reportedly flattened in the EPIC and the corpus callosum medical record system.    Mixed hyperlipidemia 11/9/2012    NAFLD (nonalcoholic fatty liver disease) 10/11/2013    CHASE (nonalcoholic steatohepatitis)     Obesity     Stroke     Type 2 diabetes mellitus with diabetic polyneuropathy, with long-term current use of insulin 5/14/2017    Type 2 diabetes mellitus with hyperglycemia, with long-term current use of insulin 10/11/2013     Past Surgical History:   Procedure Laterality Date    SKIN CANCER EXCISION       Family History     Problem Relation (Age of Onset)    Cancer Father    Diabetes Maternal Aunt    Heart disease Mother    Heart failure Mother    Hypertension Mother        Social History Main Topics    Smoking status: Never Smoker    Smokeless tobacco: Never Used    Alcohol use No    Drug use: No    Sexual activity: Not on file     Review of Systems   Constitutional: Negative for activity change, diaphoresis, fatigue, fever and unexpected weight change.   Eyes: Negative for visual disturbance.   Respiratory: Negative for cough, shortness of breath and wheezing.    Cardiovascular: Negative for chest pain and palpitations.   Gastrointestinal: Negative for abdominal distention, abdominal pain, blood in stool, constipation, diarrhea, nausea and vomiting.   Genitourinary: Negative for difficulty urinating, enuresis, frequency and urgency.   Musculoskeletal: Positive for back pain. Negative for gait problem, joint swelling, myalgias, neck pain and neck stiffness.   Skin: Negative for color change, rash and wound.   Neurological: Positive for weakness. Negative for dizziness, seizures, facial  asymmetry, speech difficulty, light-headedness, numbness and headaches.   Hematological: Does not bruise/bleed easily.   Psychiatric/Behavioral: Negative for agitation, behavioral problems, dysphoric mood and hallucinations. The patient is not nervous/anxious.      Objective:     Weight: 115.7 kg (255 lb 1.2 oz)  Body mass index is 35.59 kg/m².  Vital Signs (Most Recent):  Temp: 97.9 °F (36.6 °C) (11/23/17 1127)  Pulse: 79 (11/23/17 1127)  Resp: 16 (11/23/17 1127)  BP: (!) 112/59 (11/23/17 1127)  SpO2: (!) 94 % (11/23/17 1127) Vital Signs (24h Range):  Temp:  [97.5 °F (36.4 °C)-98.5 °F (36.9 °C)] 97.9 °F (36.6 °C)  Pulse:  [79-86] 79  Resp:  [16-18] 16  SpO2:  [92 %-98 %] 94 %  BP: (112-139)/(59-80) 112/59       Date 11/23/17 0700 - 11/24/17 0659   Shift 7437-9203 1367-9480 2922-2246 24 Hour Total   I  N  T  A  K  E   P.O. 240   240    Shift Total  (mL/kg) 240  (2.1)   240  (2.1)   O  U  T  P  U  T   Shift Total  (mL/kg)       Weight (kg) 115.7 115.7 115.7 115.7       Neurosurgery Physical Exam  General: well developed, well nourished, no distress.   Head: normocephalic, atraumatic  Neurologic: Alert and oriented. Mild confusion   GCS: Motor: 6/Verbal: 5/Eyes: 4 GCS Total: 15  Mental Status: Awake, Alert, Oriented x3  Cranial nerves: face symmetric, tongue midline, CN II-XII grossly intact.   Eyes: pupils equal, round, reactive to light with accomodation, EOMI.   Sensory: intact to light touch throughout  Motor Strength: Moves all extremities spontaneously with good tone.  Full strength upper and lower extremities.     Strength  Deltoids Triceps Biceps Wrist Extension Wrist Flexion Hand    Upper: R 5/5 5/5 5/5 5/5 5/5 5/5    L 5/5 5/5 5/5 5/5 5/5 5/5     Iliopsoas Quadriceps Knee  Flexion Tibialis  anterior Gastro- cnemius EHL   Lower: R 4/5 5/5 5/5 5/5 5/5 5/5    L 5/5 5/5 5/5 5/5 5/5 5/5     DTR's - 2 + and symmetric in UE and LE  Pronator Drift: no drift noted  Finger-to-nose: Intact bilaterally  Agarwal:  absent  Clonus: absent  Babinski: absent  Pulses: 2+ and symmetric radial and dorsalis pedis. No lower extremity edema      Significant Labs:    Recent Labs  Lab 11/22/17  0637 11/23/17  0449   * 100    142   K 4.4 4.1    109   CO2 28 25   BUN 22* 26*   CREATININE 0.9 1.0   CALCIUM 9.4 8.8   MG 1.6 1.9       Recent Labs  Lab 11/22/17  0637 11/23/17  0449   WBC 6.57 6.30   HGB 9.8* 9.0*   HCT 28.0* 25.8*    166     No results for input(s): LABPT, INR, APTT in the last 48 hours.  Microbiology Results (last 7 days)     ** No results found for the last 168 hours. **        All pertinent labs from the last 24 hours have been reviewed.    Significant Diagnostics:  MRI lumbar spine 11/20 reviewed. Acute burst type fracture of the L4 vertebra with fracture line extending to the posterior cortex of the L4 vertebra.  There is loss of central vertebral body height of the superior end plate of approximately 40%. No retropulsion.     Physical Exam

## 2017-11-24 ENCOUNTER — TELEPHONE (OUTPATIENT)
Dept: NEUROLOGY | Facility: CLINIC | Age: 57
End: 2017-11-24

## 2017-11-24 VITALS
OXYGEN SATURATION: 99 % | DIASTOLIC BLOOD PRESSURE: 80 MMHG | BODY MASS INDEX: 35.71 KG/M2 | RESPIRATION RATE: 18 BRPM | HEIGHT: 71 IN | TEMPERATURE: 98 F | SYSTOLIC BLOOD PRESSURE: 138 MMHG | HEART RATE: 72 BPM | WEIGHT: 255.06 LBS

## 2017-11-24 LAB
ALBUMIN SERPL BCP-MCNC: 3.1 G/DL
ALP SERPL-CCNC: 65 U/L
ALT SERPL W/O P-5'-P-CCNC: 19 U/L
ANION GAP SERPL CALC-SCNC: 7 MMOL/L
AST SERPL-CCNC: 17 U/L
BASOPHILS # BLD AUTO: 0.05 K/UL
BASOPHILS NFR BLD: 0.9 %
BILIRUB SERPL-MCNC: 0.6 MG/DL
BUN SERPL-MCNC: 20 MG/DL
CALCIUM SERPL-MCNC: 8.5 MG/DL
CHLORIDE SERPL-SCNC: 106 MMOL/L
CO2 SERPL-SCNC: 26 MMOL/L
CREAT SERPL-MCNC: 0.9 MG/DL
DIFFERENTIAL METHOD: ABNORMAL
EOSINOPHIL # BLD AUTO: 0.1 K/UL
EOSINOPHIL NFR BLD: 1.9 %
ERYTHROCYTE [DISTWIDTH] IN BLOOD BY AUTOMATED COUNT: 15.4 %
EST. GFR  (AFRICAN AMERICAN): >60 ML/MIN/1.73 M^2
EST. GFR  (NON AFRICAN AMERICAN): >60 ML/MIN/1.73 M^2
GLUCOSE SERPL-MCNC: 162 MG/DL
HCT VFR BLD AUTO: 26.1 %
HGB BLD-MCNC: 8.9 G/DL
IMM GRANULOCYTES # BLD AUTO: 0.05 K/UL
IMM GRANULOCYTES NFR BLD AUTO: 0.9 %
LYMPHOCYTES # BLD AUTO: 1.4 K/UL
LYMPHOCYTES NFR BLD: 26.3 %
MAGNESIUM SERPL-MCNC: 1.8 MG/DL
MCH RBC QN AUTO: 29.7 PG
MCHC RBC AUTO-ENTMCNC: 34.1 G/DL
MCV RBC AUTO: 87 FL
MONOCYTES # BLD AUTO: 0.4 K/UL
MONOCYTES NFR BLD: 6.7 %
NEUTROPHILS # BLD AUTO: 3.4 K/UL
NEUTROPHILS NFR BLD: 63.3 %
NRBC BLD-RTO: 0 /100 WBC
PHOSPHATE SERPL-MCNC: 3.2 MG/DL
PLATELET # BLD AUTO: 161 K/UL
PMV BLD AUTO: 9.2 FL
POCT GLUCOSE: 172 MG/DL (ref 70–110)
POCT GLUCOSE: 207 MG/DL (ref 70–110)
POTASSIUM SERPL-SCNC: 4.5 MMOL/L
PROT SERPL-MCNC: 6.1 G/DL
RBC # BLD AUTO: 3 M/UL
SODIUM SERPL-SCNC: 139 MMOL/L
WBC # BLD AUTO: 5.39 K/UL

## 2017-11-24 PROCEDURE — 36415 COLL VENOUS BLD VENIPUNCTURE: CPT

## 2017-11-24 PROCEDURE — 63600175 PHARM REV CODE 636 W HCPCS: Performed by: HOSPITALIST

## 2017-11-24 PROCEDURE — 84100 ASSAY OF PHOSPHORUS: CPT

## 2017-11-24 PROCEDURE — 80053 COMPREHEN METABOLIC PANEL: CPT

## 2017-11-24 PROCEDURE — 99238 HOSP IP/OBS DSCHRG MGMT 30/<: CPT | Mod: ,,, | Performed by: INTERNAL MEDICINE

## 2017-11-24 PROCEDURE — 85025 COMPLETE CBC W/AUTO DIFF WBC: CPT

## 2017-11-24 PROCEDURE — 83735 ASSAY OF MAGNESIUM: CPT

## 2017-11-24 PROCEDURE — 25000003 PHARM REV CODE 250: Performed by: HOSPITALIST

## 2017-11-24 PROCEDURE — G0378 HOSPITAL OBSERVATION PER HR: HCPCS

## 2017-11-24 RX ADMIN — PREDNISONE 10 MG: 10 TABLET ORAL at 10:11

## 2017-11-24 RX ADMIN — INSULIN ASPART 10 UNITS: 100 INJECTION, SOLUTION INTRAVENOUS; SUBCUTANEOUS at 07:11

## 2017-11-24 RX ADMIN — ASPIRIN 81 MG: 81 TABLET, COATED ORAL at 10:11

## 2017-11-24 RX ADMIN — SERTRALINE 150 MG: 100 TABLET, FILM COATED ORAL at 10:11

## 2017-11-24 RX ADMIN — ATORVASTATIN CALCIUM 40 MG: 20 TABLET, FILM COATED ORAL at 10:11

## 2017-11-24 RX ADMIN — VITAMIN D, TAB 1000IU (100/BT) 5000 UNITS: 25 TAB at 10:11

## 2017-11-24 RX ADMIN — DILTIAZEM HYDROCHLORIDE 240 MG: 240 CAPSULE, COATED, EXTENDED RELEASE ORAL at 10:11

## 2017-11-24 RX ADMIN — ATENOLOL 50 MG: 50 TABLET ORAL at 10:11

## 2017-11-24 RX ADMIN — VALSARTAN 320 MG: 160 TABLET ORAL at 10:11

## 2017-11-24 RX ADMIN — SULFAMETHOXAZOLE AND TRIMETHOPRIM 1 TABLET: 800; 160 TABLET ORAL at 10:11

## 2017-11-24 RX ADMIN — LEVETIRACETAM 500 MG: 500 TABLET ORAL at 10:11

## 2017-11-24 RX ADMIN — INSULIN ASPART 10 UNITS: 100 INJECTION, SOLUTION INTRAVENOUS; SUBCUTANEOUS at 12:11

## 2017-11-24 NOTE — TELEPHONE ENCOUNTER
Call returned to Bekah. She is concerned that pt does not have home health PT--was given an rx for PT twice a week in [place]. Informed her that Dr Love and Sharda are aware of all. Plan to touch base with her on Monday.

## 2017-11-24 NOTE — NURSING
"Offered to assist pt with putting his pants on before departure as he was wearing only a gown and underwear on his bottom half. His wife stated "No, he can leave just like that."  "

## 2017-11-24 NOTE — NURSING
Patient ambulated to  independency to notify charge nurse that wife was present at bedside for discharge instructions. RN notified. Patient walked back to room with minimal stand by assistance.

## 2017-11-24 NOTE — PLAN OF CARE
Problem: Patient Care Overview  Goal: Plan of Care Review  Outcome: Ongoing (interventions implemented as appropriate)   11/24/17 0313   Coping/Psychosocial   Plan Of Care Reviewed With patient     Assumed care of patient at 1900. Aox3, flat affect at times. Wife at bedside, helps with ADLs. Patient ambulates with wife to the bathroom. Patient denies any pain and voices no concerns. CBG last night was below 200; no short acting ordered at this time, received lantus 20 units, see MAR. Bed in lowest position, call light in reach. Will continue to monitor.    Problem: Pressure Ulcer Risk (Emre Scale) (Adult,Obstetrics,Pediatric)  Intervention: Prevent/Minimize Sheer/Friction Injuries   11/24/17 0313   Skin Interventions   Pressure Reduction Techniques frequent weight shift encouraged     Intervention: Turn/Reposition Often   11/24/17 0200 11/24/17 0313   Skin Interventions   Pressure Reduction Techniques --  frequent weight shift encouraged   Positioning   Body Position side-lying, left --        Goal: Skin Integrity  Patient will demonstrate the desired outcomes by discharge/transition of care.   Outcome: Ongoing (interventions implemented as appropriate)   11/24/17 0313   Pressure Ulcer Risk (Emre Scale) (Adult,Obstetrics,Pediatric)   Skin Integrity achieves outcome

## 2017-11-24 NOTE — PLAN OF CARE
Problem: Patient Care Overview  Goal: Plan of Care Review  Outcome: Ongoing (interventions implemented as appropriate)  No acute changes. Pt compliant with [facility] OOB. Pt's POCT glucose 337 pre-prandial at dinner. Dr. Lowe contacted who said he would add sliding scale insulin to pt's regimen. VSS. AOx4. Pleasant and cooperative with care.

## 2017-11-24 NOTE — TELEPHONE ENCOUNTER
----- Message from Shadi Jose sent at 11/24/2017 11:54 AM CST -----  Contact: Bekah (Wife) [phone]  Bekah called to speak to someone regarding the patient's hospital visit. Please contact her to discuss further.

## 2017-11-24 NOTE — NURSING
IV removed. All orders reviewed with wife and pt. Wife verbalized she was unhappy with pt's discharge orders for outpatient therapy. She stated she thinks pt needs SNF. Pt and wife re-educated of reason for orders based on PT's evaluation of him. All belongings accounted for. Wheelchair escort en route.

## 2017-11-24 NOTE — PLAN OF CARE
Call received from patient's wife Bekah. She states that she believes that their insurance will cover therapy at home. She  would like referral to [facility] in [place]. Called [facility] and their office is closed today. Informed spouse that CM will follow up on Monday and see if services could be provided at home.

## 2017-11-24 NOTE — PLAN OF CARE
[facility]    HOME HEALTH ORDERS  FACE TO FACE ENCOUNTER    Patient Name: Bessy Nunez  YOB: 1960    PCP: Edgar Nova MD   PCP Address: [address] / [place] [address]  PCP Phone Number: [phone]  PCP Fax: [phone]    Encounter Date: 11/24/2017    Admit to Home Health    Diagnoses:  Active Hospital Problems    Diagnosis  POA    *Closed compression fracture of L4 lumbar vertebra [S32.040A]  Yes    Closed stable burst fracture of fourth lumbar vertebra [S32.041A]  Yes    L4 vertebral fracture [S32.049A]  Yes    Transaminitis [R74.0]  Yes     Increase in AST, ALT 09/19/17.  Started cyclophosphamide, prednisone 09/18/17.      Chemotherapy-induced neutropenia [D70.1, T45.1X5A]  Yes    History of recent fall [Z91.81]  Not Applicable    Urinary incontinence [R32]  Yes    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]  Yes     Follows with Dr. Love  By mri.  Several active lesions, many old.  5/13: MRA brain/neck, MRI brain w/wo contrast show no vascular occlusion, multiple foci of hyperintensity in deep cerebral periventricular white matter in pattern of demyelinating process.   5/17: MRI spine performed, no spinal cord lesions.  Hospitalization 6/2017. EEG was performed negative for seizures.  Repeat MRI showing new lesion, question whether this was demyelination versus CVA.  Cytoxan 6/3/17 & 8/14/17      Type 2 diabetes mellitus with diabetic polyneuropathy, with long-term current use of insulin [E11.42, Z79.4]  Not Applicable    NAFLD (nonalcoholic fatty liver disease) [K76.0]  Yes     6/12/2015 liver biopsy showing advanced stage fibrosis with bridging fibrosis and scattered nodules.      Obesity (BMI 30-39.9) [E66.9]  Yes    JOHN (obstructive sleep apnea) [G47.33]  Yes    Essential hypertension [I10]  Yes    Mixed hyperlipidemia [E78.2]  Yes      Resolved Hospital Problems    Diagnosis Date Resolved POA   No resolved problems to display.       Future Appointments  Date Time  Provider Department Center   11/30/2017 1:15 PM Carmencita Harman, CCC-SLP [facility] OP [facility] [place] [facility]   12/1/2017 9:30 AM Tami Luong, POPEYE [facility] RHB [place] [facility]   12/1/2017 11:00 AM LAB, HEMONC CANCER BLDG [facility] LAB [facility]   12/8/2017 2:30 PM Promise Steele NP [facility] ENDO DI [place] - B   12/14/2017 12:45 PM LAB, HEMONC SAME DAY [facility]   12/14/2017 1:40 PM Lobito Goldstein MD [facility] BM YEH [place]   12/14/2017 2:00 PM [facility], [facility]MO [facility]   12/28/2017 2:30 PM GARBIEL Somers, CNS [facility] MSC [facility]     Follow-up Information     [facility]- Multiple Sclerosis.    Specialty:  Neurology  Contact information:  [address]  [facility] [address] [phone]  Additional information:   [facility] [place] 6[facility] floor                   I have seen and examined this patient face to face today. My clinical findings that support the need for the home health skilled services and home bound status are the following:  Weakness/numbness causing balance and gait disturbance due to Weakness/Debility making it taxing to leave home.    Allergies:Review of patient's allergies indicates:  No Known Allergies    Diet: regular diet    Activities: activity as tolerated    Nursing:   SN to complete comprehensive assessment including routine vital signs. Instruct on disease process and s/s of complications to report to MD. Review/verify medication list sent home with the patient at time of discharge  and instruct patient/caregiver as needed. Frequency may be adjusted depending on start of care date.    Notify MD if SBP > 160 or < 90; DBP > 90 or < 50; HR > 120 or < 50; Temp > 101; Other:         CONSULTS:    Physical Therapy to evaluate and treat. Evaluate for home safety and equipment needs; Establish/upgrade home exercise program. Perform / instruct on therapeutic exercises, gait training, transfer training, and Range of Motion.  Speech Therapy  to evaluate and treat for   "Language.          Medications: Review discharge medications with patient and family and provide education.      Discharge Medication List as of 11/24/2017 11:27 AM      CONTINUE these medications which have NOT CHANGED    Details   aspirin (ECOTRIN) 81 MG EC tablet Take 81 mg by mouth once daily., Until Discontinued, Historical Med      atenolol (TENORMIN) 50 MG tablet Take 1 tablet (50 mg total) by mouth once daily., Starting 3/22/2017, Until Discontinued, Normal      atorvastatin (LIPITOR) 40 MG tablet Take 1 tablet (40 mg total) by mouth once daily., Starting 3/22/2017, Until Discontinued, Normal      blood sugar diagnostic Strp To check BG 4 times daily, to use with insurance preferred meter, Normal      blood-glucose meter kit To check BG 4 times daily, one touch verio meter. Dx code e11.65, Normal      diltiaZEM (DILACOR XR) 240 MG CDCR Take 1 capsule (240 mg total) by mouth once daily., Starting 3/22/2017, Until Discontinued, Normal      insulin glargine (BASAGLAR KWIKPEN) 100 unit/mL (3 mL) InPn pen Inject 32 Units into the skin 2 (two) times daily. Once in the morning at 9 am and once in the evening at 9 pm., Starting Thu 9/28/2017, Until Fri 9/28/2018, Normal      insulin lispro (HUMALOG) 100 unit/mL injection Inject 20 units before meals, Normal      insulin needles, disposable, (BD ULTRA-FINE ANA PEN NEEDLES) 32 x 5/32 " Ndle Inject twice daily, Normal      insulin syringe-needle U-100 (INSULIN SYRINGE) 1/2 mL 30 gauge x 5/16 Syrg Use 3x/day, Normal      !! lancets Misc To check BG 4 times daily, one touch verio meter. Dx code e11.65, Normal      levetiracetam (KEPPRA) 500 MG Tab Take 1 tablet (500 mg total) by mouth 2 (two) times daily., Starting Wed 9/20/2017, Until Thu 9/20/2018, Normal      liraglutide 0.6 mg/0.1 mL, 18 mg/3 mL, subq PNIJ (VICTOZA 3-TOMASZ) 0.6 mg/0.1 mL (18 mg/3 mL) PnIj Inject 1.8 mg into the skin once daily., Starting Thu 11/16/2017, Until Fri 11/16/2018, Normal      metformin " (GLUCOPHAGE-XR) 500 MG 24 hr tablet Take 2 tablets (1,000 mg total) by mouth 2 (two) times daily with meals., Starting Wed 9/20/2017, Until Thu 9/20/2018, Normal      omega-3 fatty acids-vitamin E (FISH OIL) 1,000 mg Cap Take 1 capsule by mouth 2 (two) times daily., Starting 3/2/2017, Until Fri 3/2/18, OTC      !! ONETOUCH DELICA LANCETS 33 gauge Misc Starting Wed 9/27/2017, Historical Med      predniSONE (DELTASONE) 10 MG tablet Take 1 tablet (10 mg total) by mouth once daily., Starting Fri 11/10/2017, Until Sun 12/10/2017, Normal      ranitidine (ZANTAC) 150 MG tablet Take 1 tablet (150 mg total) by mouth 2 (two) times daily., Starting Sun 6/4/2017, Until Mon 6/4/2018, Normal      sertraline (ZOLOFT) 100 MG tablet 1 and half tablet daily, Normal      sulfamethoxazole-trimethoprim 800-160mg (BACTRIM DS) 800-160 mg Tab Take every Monday, Wednesday, and Friday.  Infectious Disease will decide if this is needed in 3-4 months., Normal      valsartan (DIOVAN) 320 MG tablet Take 1 tablet (320 mg total) by mouth once daily., Starting 3/22/2017, Until Thu 3/22/18, No Print      vitamin D 1000 units Tab Take 5 tablets (5,000 Units total) by mouth once daily., Starting 5/18/2017, Until Discontinued, OTC      calcium carbonate (OS-DONNA) 500 mg calcium (1,250 mg) tablet Take 2 tablets (1,000 mg total) by mouth once., Starting Mon 9/18/2017, OTC       !! - Potential duplicate medications found. Please discuss with provider.          I certify that this patient is confined to his home and needs physical therapy.

## 2017-11-26 NOTE — DISCHARGE SUMMARY
[facility] Hospital Medicine  Discharge Summary      Patient Name: Bessy Nunez  MRN: [id number]  Admission Date: 11/20/2017  Hospital Length of Stay: 4 days  Discharge Date and Time: 11/24/2017  1:09 PM  Attending Physician: No att. providers found   Discharging Provider: Braydon Lowe MD  Primary Care Provider: Edgar Nova MD  Hospital Medicine Team: [facility] HOSP MED B Braydon Lowe MD    HPI:   No notes on file    * No surgery found *      Hospital Course:   Patient  presented to the ED on 11/14 due to a fall when he missed a step  where he hit his back against a brick wall. denies head trauma or loss of consciousness.  He came to ER due to back pain - reported at 8/10 on sitting and standing, nonradiating and  X-rays at that time didn't show a significant fracture and he was discharged.He reports gait instability for about 2 weeks , isubjectiev left LE weakness even before the fall. wife reports difficulty ambulating with back pain and on last saturday he could not get himself up from the bathtub; she had to call neighbor for help . wife called his neurologist Dr. love who ordered MRI spine Patient presented to the ED after being advised by Dr. Love -  neurologist due to abnormal lumbar spine MRI 11/20 - Acute burst type fracture of the L4 vertebra with fracture line extending to the posterior cortex of the L4 vertebra. He was advised to come to the ED for neurosurgical evaluation and further work up. denies constipation, urinary renetion, groin numbness.  He  was on cyclophosphamide therapy for vasculitis monthely, last dose was on 11/17.    NS recommend back brace. Was not approved for SNF. Will try to set up home health PT services as wife is having a difficult time at home.     Consults:   Consults         Status Ordering Provider     Inpatient consult to neurology  Once     Provider:  (Not yet assigned)    Completed AUGUSTIN DONALDSON          * Closed compression fracture of L4  lumbar vertebra    No intervention p[er NS  Plan on SNF or rehab admit  Painin reasonable control  Back brace placed            Final Active Diagnoses:    Diagnosis Date Noted POA    PRINCIPAL PROBLEM:  Closed compression fracture of L4 lumbar vertebra [S32.040A] 11/15/2017 Yes    Closed stable burst fracture of fourth lumbar vertebra [S32.041A] 11/22/2017 Yes    L4 vertebral fracture [S32.049A] 11/20/2017 Yes    Transaminitis [R74.0] 09/30/2017 Yes    Chemotherapy-induced neutropenia [D70.1, T45.1X5A] 08/30/2017 Yes    History of recent fall [Z91.81] 06/14/2017 Not Applicable    Urinary incontinence [R32] 06/04/2017 Yes    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] 06/02/2017 Yes    Type 2 diabetes mellitus with diabetic polyneuropathy, with long-term current use of insulin [E11.42, Z79.4] 05/14/2017 Not Applicable    NAFLD (nonalcoholic fatty liver disease) [K76.0] 10/11/2013 Yes    Obesity (BMI 30-39.9) [E66.9] 10/11/2013 Yes    JOHN (obstructive sleep apnea) [G47.33] 10/11/2013 Yes    Essential hypertension [I10] 11/09/2012 Yes    Mixed hyperlipidemia [E78.2] 11/09/2012 Yes      Problems Resolved During this Admission:    Diagnosis Date Noted Date Resolved POA       Discharged Condition: good    Disposition: Home or Self Care    Follow Up:  Follow-up Information     [facility]- Multiple Sclerosis.    Specialty:  Neurology  Contact information:  [address]  [facility] [address] [phone]  Additional information:   Clinic [place] 6th floor               Patient Instructions:     Ambulatory consult to Speech Therapy   Referral Priority: Routine Referral Type: Speech Therapy   Referral Reason: Specialty Services Required    Requested Specialty: Speech Pathology    Number of Visits Requested: 1      Ambulatory consult to Physical Therapy   Referral Priority: Routine Referral Type: Physical Medicine   Referral Reason: Specialty Services Required    Requested Specialty: Physical Therapy    Number of Visits  "Requested: 1          Significant Diagnostic Studies: Labs: BMP: No results for input(s): GLU, NA, K, CL, CO2, BUN, CREATININE, CALCIUM, MG in the last 48 hours.    Pending Diagnostic Studies:     None         Medications:  Reconciled Home Medications:   Discharge Medication List as of 11/24/2017 11:27 AM      CONTINUE these medications which have NOT CHANGED    Details   aspirin (ECOTRIN) 81 MG EC tablet Take 81 mg by mouth once daily., Until Discontinued, Historical Med      atenolol (TENORMIN) 50 MG tablet Take 1 tablet (50 mg total) by mouth once daily., Starting 3/22/2017, Until Discontinued, Normal      atorvastatin (LIPITOR) 40 MG tablet Take 1 tablet (40 mg total) by mouth once daily., Starting 3/22/2017, Until Discontinued, Normal      blood sugar diagnostic Strp To check BG 4 times daily, to use with insurance preferred meter, Normal      blood-glucose meter kit To check BG 4 times daily, one touch verio meter. Dx code e11.65, Normal      calcium carbonate (OS-DONNA) 500 mg calcium (1,250 mg) tablet Take 2 tablets (1,000 mg total) by mouth once., Starting Mon 9/18/2017, OTC      diltiaZEM (DILACOR XR) 240 MG CDCR Take 1 capsule (240 mg total) by mouth once daily., Starting 3/22/2017, Until Discontinued, Normal      insulin glargine (BASAGLAR KWIKPEN) 100 unit/mL (3 mL) InPn pen Inject 32 Units into the skin 2 (two) times daily. Once in the morning at 9 am and once in the evening at 9 pm., Starting Thu 9/28/2017, Until Fri 9/28/2018, Normal      insulin lispro (HUMALOG) 100 unit/mL injection Inject 20 units before meals, Normal      insulin needles, disposable, (BD ULTRA-FINE ANA PEN NEEDLES) 32 x 5/32 " Ndle Inject twice daily, Normal      insulin syringe-needle U-100 (INSULIN SYRINGE) 1/2 mL 30 gauge x 5/16 Syrg Use 3x/day, Normal      !! lancets Misc To check BG 4 times daily, one touch verio meter. Dx code e11.65, Normal      levetiracetam (KEPPRA) 500 MG Tab Take 1 tablet (500 mg total) by mouth 2 (two) " times daily., Starting Wed 9/20/2017, Until Thu 9/20/2018, Normal      liraglutide 0.6 mg/0.1 mL, 18 mg/3 mL, subq PNIJ (VICTOZA 3-TOMASZ) 0.6 mg/0.1 mL (18 mg/3 mL) PnIj Inject 1.8 mg into the skin once daily., Starting Thu 11/16/2017, Until Fri 11/16/2018, Normal      metformin (GLUCOPHAGE-XR) 500 MG 24 hr tablet Take 2 tablets (1,000 mg total) by mouth 2 (two) times daily with meals., Starting Wed 9/20/2017, Until Thu 9/20/2018, Normal      omega-3 fatty acids-vitamin E (FISH OIL) 1,000 mg Cap Take 1 capsule by mouth 2 (two) times daily., Starting 3/2/2017, Until Fri 3/2/18, OTC      !! ONETOUCH DELICA LANCETS 33 gauge Misc Starting Wed 9/27/2017, Historical Med      predniSONE (DELTASONE) 10 MG tablet Take 1 tablet (10 mg total) by mouth once daily., Starting Fri 11/10/2017, Until Sun 12/10/2017, Normal      ranitidine (ZANTAC) 150 MG tablet Take 1 tablet (150 mg total) by mouth 2 (two) times daily., Starting Sun 6/4/2017, Until Mon 6/4/2018, Normal      sertraline (ZOLOFT) 100 MG tablet 1 and half tablet daily, Normal      sulfamethoxazole-trimethoprim 800-160mg (BACTRIM DS) 800-160 mg Tab Take every Monday, Wednesday, and Friday.  Infectious Disease will decide if this is needed in 3-4 months., Normal      valsartan (DIOVAN) 320 MG tablet Take 1 tablet (320 mg total) by mouth once daily., Starting 3/22/2017, Until Thu 3/22/18, No Print      vitamin D 1000 units Tab Take 5 tablets (5,000 Units total) by mouth once daily., Starting 5/18/2017, Until Discontinued, OTC       !! - Potential duplicate medications found. Please discuss with provider.          Indwelling Lines/Drains at time of discharge:   Lines/Drains/Airways          No matching active lines, drains, or airways          Time spent on the discharge of patient: 30 minutes  Patient was seen and examined on the date of discharge and determined to be suitable for discharge.         Braydon Lowe MD  Department of [facility] Medicine  [facility]  [place]

## 2017-11-26 NOTE — HOSPITAL COURSE
Patient  presented to the ED on 11/14 due to a fall when he missed a step  where he hit his back against a brick wall. denies head trauma or loss of consciousness.  He came to ER due to back pain - reported at 8/10 on sitting and standing, nonradiating and  X-rays at that time didn't show a significant fracture and he was discharged.He reports gait instability for about 2 weeks , isubjectiev left LE weakness even before the fall. wife reports difficulty ambulating with back pain and on last saturday he could not get himself up from the bathtub; she had to call neighbor for help . wife called his neurologist Dr. love who ordered MRI spine Patient presented to the ED after being advised by Dr. Love -  neurologist due to abnormal lumbar spine MRI 11/20 - Acute burst type fracture of the L4 vertebra with fracture line extending to the posterior cortex of the L4 vertebra. He was advised to come to the ED for neurosurgical evaluation and further work up. denies constipation, urinary renetion, groin numbness.  He  was on cyclophosphamide therapy for vasculitis monthely, last dose was on 11/17.    NS recommend back brace. Was not approved for SNF. Will try to set up home health PT services as wife is having a difficult time at home.

## 2017-11-27 ENCOUNTER — PATIENT OUTREACH (OUTPATIENT)
Dept: ADMINISTRATIVE | Facility: CLINIC | Age: 57
End: 2017-11-27

## 2017-11-27 NOTE — PATIENT INSTRUCTIONS
Fall Prevention  Falls often occur due to slipping, tripping or losing your balance. Millions of people fall every year and injure themselves. Here are ways to reduce your risk of falling again.  · Think about your fall, was there anything that caused your fall that can be fixed, removed, or replaced?  · Make your home safe by keeping walkways clear of objects you may trip over.  · Use non-slip pads under rugs. Do notU use area rugs or small throw rugs.  · se non-slip mats in bathtubs and showers.  · Install handrails and lights on staircases.  · Do not walk in poorly lit areas.  · Do not stand on chairs or wobbly ladders.  · Use caution when reaching overhead or looking upward. This position can cause a loss of balance.  · Be sure your shoes fit properly, have non-slip bottoms and are in good condition.   · Wear shoes both inside and out. Avoid going barefoot or wearing slippers.  · Be cautious when going up and down stairs, curbs, and when walking on uneven sidewalks.  · If your balance is poor, consider using a cane or walker.  · If your fall was related to alcohol use, stop or limit alcohol intake.   · If your fall was related to use of sleeping medicines, talk to your doctor about this. You may need to reduce your dosage at bedtime if you awaken during the night to go to the bathroom.    · To reduce the need for nighttime bathroom trips:  ¨ Avoid drinking fluids for several hours before going to bed  ¨ Empty your bladder before going to bed  ¨ Men can keep a urinal at the bedside  · Stay as active as you can. Balance, flexibility, strength, and endurance all come from exercise. They all play a role in preventing falls. Ask your healthcare provider which types of activity are right for you.  · Get your vision checked on a regular basis.  · If you have pets, know where they are before you stand up or walk so you don't trip over them.  · Use night lights.  Date Last Reviewed: 11/5/2015  © 3024-2023 [employer]. [address] [place] [address], [place] [address]. All rights reserved. This information is not intended as a substitute for professional medical care. Always follow your healthcare professional's instructions.

## 2017-11-27 NOTE — PLAN OF CARE
Called [facility] [place] [phone] and they state that they are not able to accept patient. They are not able to provide therapy services with patient's Medicaid plan. Called spouse to inform.

## 2017-11-28 ENCOUNTER — TELEPHONE (OUTPATIENT)
Dept: FAMILY MEDICINE | Facility: CLINIC | Age: 57
End: 2017-11-28

## 2017-11-28 DIAGNOSIS — E11.65 TYPE 2 DIABETES MELLITUS WITH HYPERGLYCEMIA, WITH LONG-TERM CURRENT USE OF INSULIN: Primary | ICD-10-CM

## 2017-11-28 DIAGNOSIS — I63.81 LACUNAR STROKE OF LEFT SUBTHALAMIC REGION: ICD-10-CM

## 2017-11-28 DIAGNOSIS — I77.6 CNS VASCULITIS: ICD-10-CM

## 2017-11-28 DIAGNOSIS — Z79.4 TYPE 2 DIABETES MELLITUS WITH HYPERGLYCEMIA, WITH LONG-TERM CURRENT USE OF INSULIN: Primary | ICD-10-CM

## 2017-11-28 DIAGNOSIS — S32.041A CLOSED STABLE BURST FRACTURE OF FOURTH LUMBAR VERTEBRA, INITIAL ENCOUNTER: ICD-10-CM

## 2017-11-28 NOTE — TELEPHONE ENCOUNTER
----- Message from Dalia Carlos sent at 11/28/2017  3:58 PM CST -----  Contact: Chantelle/Uzair/[phone]/fax   Chantelle is requesting orders for . She states that she would like the staff to send the orders to [facility]. The fax number is [phone]. Thank you.

## 2017-11-28 NOTE — PROGRESS NOTES
I have personally seen and examined the patient along with Dr. Olguin , and agree with assessment and recommendations.    Shana Love MD

## 2017-11-28 NOTE — TELEPHONE ENCOUNTER
Phoned pt's wife to discuss needs and resources.  She advised that pt and wife are both unemployed and are receiving Medicaid. Wife did apply for SSA Disability for pt and he has been approved.  He begins receiving this benefit on December 8th ~ $2000/month.  She also applied to Medicaid Long-Term Personal Care Services waiver and was interviewed about 5 months ago.  She's not heard from the program since.  Attempted to call the program with wife, today, but we were disconnected.  Phoned her back immediately and she did not answer.  Pt could be eligible for LTP support but if he loses Medicaid he will no longer be eligible for this waiver and will have to apply for the [employer] Waiver which has a wait list of several years.  Mrs. Nunez did inquire about home health services for pt and [facility] does accept his insurance.  I explained, prior to getting disconnected, that we could place that referral.  She shared that he's also been referred to an outpatient rehabilitation center in [place] and is supposed to return there this week.  Unfortunately, the call was disconnected and despite three attempts to phone Mrs. Nunez on available numbers she did not answer.  I left my direct phone number on her cell and on pt's cell.

## 2017-11-30 ENCOUNTER — CLINICAL SUPPORT (OUTPATIENT)
Dept: REHABILITATION | Facility: HOSPITAL | Age: 57
End: 2017-11-30
Payer: MEDICAID

## 2017-11-30 DIAGNOSIS — R49.0 DYSPHONIA: ICD-10-CM

## 2017-11-30 DIAGNOSIS — R47.01 APHASIA: ICD-10-CM

## 2017-11-30 DIAGNOSIS — R41.89 COGNITIVE DEFICITS: ICD-10-CM

## 2017-11-30 PROCEDURE — 92523 SPEECH SOUND LANG COMPREHEN: CPT | Mod: PN

## 2017-11-30 NOTE — PT/OT/SLP DISCHARGE
Physical Therapy Discharge Summary    Bessy Nunez  MRN: [id number]   Closed compression fracture of L4 lumbar vertebra   Patient Discharged from acute Physical Therapy on 17.  Please refer to prior PT noted date on 17 for functional status.     Assessment:   Patient appropriate for care in another setting.  GOALS:    Physical Therapy Goals        Problem: Physical Therapy Goal    Goal Priority Disciplines Outcome Goal Variances Interventions   Physical Therapy Goal     PT/OT, PT Ongoing (interventions implemented as appropriate)     Description:  Goals to be met by: 2017    Patient will increase functional independence with mobility by performin. Supine to sit with Modified [place]  2. Sit to supine with Modified [place]  3. Sit to stand transfer with Mod I  4. Bed to chair transfer with Modified [place]  5. Gait  x 150 feet with Supervision using Rolling Walker or appropriate AD.  6. Improve EDGE score to 28/56 to improve safety and overall functional mobility.  7. Improve Tinetti score to 17/28 to improve safety and overall functional mobility.                     Reasons for Discontinuation of Therapy Services  Transfer to alternate level of care.      Plan:  Patient Discharged to: PT recommends HHPT/HHOT services; however, patient discharged home with family care.    Hai Henson III, DPT, PT  2017

## 2017-12-01 ENCOUNTER — CLINICAL SUPPORT (OUTPATIENT)
Dept: REHABILITATION | Facility: HOSPITAL | Age: 57
End: 2017-12-01
Payer: MEDICAID

## 2017-12-01 ENCOUNTER — LAB VISIT (OUTPATIENT)
Dept: LAB | Facility: HOSPITAL | Age: 57
End: 2017-12-01
Attending: INTERNAL MEDICINE
Payer: MEDICAID

## 2017-12-01 DIAGNOSIS — Z51.11 ENCOUNTER FOR CHEMOTHERAPY MANAGEMENT: ICD-10-CM

## 2017-12-01 DIAGNOSIS — R26.89 BALANCE PROBLEMS: ICD-10-CM

## 2017-12-01 DIAGNOSIS — G35 MULTIPLE SCLEROSIS: ICD-10-CM

## 2017-12-01 DIAGNOSIS — R29.6 FREQUENT FALLS: ICD-10-CM

## 2017-12-01 DIAGNOSIS — R26.81 GAIT INSTABILITY: ICD-10-CM

## 2017-12-01 DIAGNOSIS — R53.1 LEFT-SIDED WEAKNESS: ICD-10-CM

## 2017-12-01 LAB
ABO + RH BLD: NORMAL
ALBUMIN SERPL BCP-MCNC: 3.9 G/DL
ALP SERPL-CCNC: 110 U/L
ALT SERPL W/O P-5'-P-CCNC: 20 U/L
ANION GAP SERPL CALC-SCNC: 11 MMOL/L
AST SERPL-CCNC: 15 U/L
BASOPHILS # BLD AUTO: 0.04 K/UL
BASOPHILS NFR BLD: 1.2 %
BILIRUB SERPL-MCNC: 0.6 MG/DL
BLD GP AB SCN CELLS X3 SERPL QL: NORMAL
BUN SERPL-MCNC: 28 MG/DL
CALCIUM SERPL-MCNC: 10.6 MG/DL
CHLORIDE SERPL-SCNC: 108 MMOL/L
CO2 SERPL-SCNC: 26 MMOL/L
CREAT SERPL-MCNC: 1.1 MG/DL
DIFFERENTIAL METHOD: ABNORMAL
EOSINOPHIL # BLD AUTO: 0.1 K/UL
EOSINOPHIL NFR BLD: 2.7 %
ERYTHROCYTE [DISTWIDTH] IN BLOOD BY AUTOMATED COUNT: 15.6 %
EST. GFR  (AFRICAN AMERICAN): >60 ML/MIN/1.73 M^2
EST. GFR  (NON AFRICAN AMERICAN): >60 ML/MIN/1.73 M^2
GLUCOSE SERPL-MCNC: 121 MG/DL
HCT VFR BLD AUTO: 34.8 %
HGB BLD-MCNC: 11.5 G/DL
IMM GRANULOCYTES # BLD AUTO: 0.02 K/UL
IMM GRANULOCYTES NFR BLD AUTO: 0.6 %
LYMPHOCYTES # BLD AUTO: 1.4 K/UL
LYMPHOCYTES NFR BLD: 40.9 %
MCH RBC QN AUTO: 29.2 PG
MCHC RBC AUTO-ENTMCNC: 33 G/DL
MCV RBC AUTO: 88 FL
MONOCYTES # BLD AUTO: 0.8 K/UL
MONOCYTES NFR BLD: 23 %
NEUTROPHILS # BLD AUTO: 1 K/UL
NEUTROPHILS NFR BLD: 31.6 %
NRBC BLD-RTO: 0 /100 WBC
PLATELET # BLD AUTO: 230 K/UL
PMV BLD AUTO: 9.2 FL
POTASSIUM SERPL-SCNC: 4.7 MMOL/L
PROT SERPL-MCNC: 7.5 G/DL
RBC # BLD AUTO: 3.94 M/UL
SODIUM SERPL-SCNC: 145 MMOL/L
WBC # BLD AUTO: 3.3 K/UL

## 2017-12-01 PROCEDURE — 80053 COMPREHEN METABOLIC PANEL: CPT

## 2017-12-01 PROCEDURE — 86901 BLOOD TYPING SEROLOGIC RH(D): CPT

## 2017-12-01 PROCEDURE — 86900 BLOOD TYPING SEROLOGIC ABO: CPT

## 2017-12-01 PROCEDURE — 36415 COLL VENOUS BLD VENIPUNCTURE: CPT

## 2017-12-01 PROCEDURE — 97162 PT EVAL MOD COMPLEX 30 MIN: CPT | Mod: PN

## 2017-12-01 PROCEDURE — 85025 COMPLETE CBC W/AUTO DIFF WBC: CPT

## 2017-12-01 NOTE — PLAN OF CARE
TIME RECORD    Date: 12/1/17    Start Time:  0950  Stop Time:  1030    PROCEDURES:    TIMED  Procedure Min.                         UNTIMED  Procedure Min.   1 mod eval 40         Total Timed Minutes:  0  Total Timed Units:  0  Total Untimed Units:  1   Charges Billed/# of units:  1 mod eval      OUTPATIENT NEUROLOGICAL REHABILITATION  PHYSICAL THERAPY EVALUATION    Onset Date: CVA 9/17  Primary Diagnosis:   1. Frequent falls     2. Balance problems     3. Gait instability       Treatment Diagnosis: balance problems, L sided weakness, gait instability,   Past Medical History:   Diagnosis Date    CNS vasculitis 6/2/2017    Follows with Dr. Love By mri.  Several active lesions, many old. 5/13: MRA brain/neck, MRI brain w/wo contrast show no vascular occlusion, multiple foci of hyperintensity in deep cerebral periventricular white matter in pattern of demyelinating process.  5/17: MRI spine performed, no spinal cord lesions. Hospitalization 6/2017. EEG was performed negative for seizures.  Repeat MRI showing new lesion, question whether this was demyelination versus CVA. 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 Findings compatible with a small acute lacunar infarct adjacent to the left frontal horn.  Nonspecific enhancement just inferior to this region and extending into the left basal ganglia, as well as within the inferior aspect of the left cerebellum.  No evidence of a focal mass. 2.  Extensive increased signal intensity involving the periventricular white matter compatible with demyelinating disease versus vasculitis. 3.  Clinical correlation and followup recommended. 4.  This reportedly flattened in the EPIC and the corpus callosum medical record system.    Mixed hyperlipidemia 11/9/2012     "NAFLD (nonalcoholic fatty liver disease) 10/11/2013    CHASE (nonalcoholic steatohepatitis)     Obesity     Stroke     Type 2 diabetes mellitus with diabetic polyneuropathy, with long-term current use of insulin 5/14/2017    Type 2 diabetes mellitus with hyperglycemia, with long-term current use of insulin 10/11/2013     Precautions: compression fx with brace  Prior Therapy: OP PT in summer [facility] for impaired balance   Medications: Bessy Nunez has a current medication list which includes the following prescription(s): aspirin, atenolol, atorvastatin, blood sugar diagnostic, blood-glucose meter, calcium carbonate, diltiazem, insulin glargine, insulin lispro, pen needle, diabetic, insulin syringe-needle u-100, lancets, levetiracetam, liraglutide 0.6 mg/0.1 ml (18 mg/3 ml) subq pnij, metformin, omega-3 fatty acids-vitamin e, onetouch delica lancets, prednisone, ranitidine, sertraline, sulfamethoxazole-trimethoprim 800-160mg, valsartan, and vitamin d.  Nutrition:  Normal  History of Present Illness: impaired balance, L sided weakness, memory deficits  Prior Level of Function: since may pt has as an abundance of dx and illnesses affecting function and balance   Social History: lives with wife  Place of Residence (Steps/Adaptations): 2 story home with downstairs as main living area  Functional Deficits Leading to Referral/Nature of Injury: MS, vasculitis, CVA in September  Previous functional status includes: impaired mobility due to MS and vasculitis  Current functional status:  Needs assist with taking meds, preparing needs and sits in the shower, cant sit in the tub  DME owned: [employer], [employer],  Work/Job description:  Retired from [employer] since CVA  Fall Incidence: more than 10  In 6 months    Patient Therapy Goals: to have my balance back and to be more indep      Subjective:      Pt stated: "I fall a lot and I would use an AD if I had to"   Family present/states: --  Pain: 8/10 in the back due to compression " fx.    Objective:      - Command followin% simple commands    - Speech: memory deficits and pt was very flat and drowsy    Mental status: alert, oriented to person, place, and time  Behavior:  cooperative  Attention Span and Concentration:  Normal    Dominant hand:  right     Posture Alignment :slouched posture    Sensation:  Light Touch: Intact           Proprioception:   Intact    Tone: 0 - No increase in muscle tone    Visual/Auditory: vision changes due to the MS but no change since CVA    Coordination:   - fine motor: impaired   - UE coordination: impaired L UE ataxic, dysdiadokinesia  - LE coordination:  dysdiodokinesia in feet, unable to coordinate toe taps    ROM:   UPPER EXTREMITY--AROM/PROM  (R) UE: WFLs  (L) UE: WFLs           RANGE OF MOTION--LOWER EXTREMITIES  (R) LE Hip: WFL   Knee: WFL   Ankle: WFL    (L) LE: Hip: WFL   Knee: WFL   Ankle: WFL    Upper Extremity Strength    All major ms groups 3/5 or greater L UE coordination impairments    Lower Extremity Strength   RLE LLE   Hip Flexion: 4-/5 3/5   Hip Extension:  3+/5 3-/5   Hip Abduction: 4-/5 3+/5   Knee Extension: 4/5 4-/5   Knee Flexion: 4/5 3+/5   Ankle Dorsiflexion: 4/5 4/5   Ankle Plantarflexion: 4/5 4/5     Gait Assessment:   - AD used: none  - Assistance: CGA  - Distance: >200 ft  - Stairs: see DGI    Gait Analysis:  Deviations noted: wide JOAQUIN, decreased arm swing, decreased hip flexion, decreased heel contact in heel strike    Impairments contributing to deviations:  L sided weakness, general weakness    Endurance Deficit: pt is limited in community distances    Balance Assessment:    Sitting balance (static,dynamic):WFL  Standing balance (static, dynamic):see DGI    Dynamic Gait Index - assessed without AD  1. Gait level surface -  2  2. Change in gait speed - 1  3. Gait with horizontal head turns - 1  4. Gait with vertical head turns - 1  5. Gait and pivot turn - 2  6. Step over obstacle - 1  7. Step around obstacle - 1  8. Steps -  2  Total 11/24 54% impaired    Functional Mobility (Bed mobility, transfers)  Bed mobility: I  Supine to sit: I  Sit to supine: I  Rolling: I  Transfers to bed: I  Sit to stand:  I but unsteady  Stand pivot:  I  Stairs: supervision required  Wheelchair mobility: N/A      Patient Education/Response:     Written Home Exercises Provided: not issued today  Education provided re:role of PT, goals for PT, scheduling - pt verbalized fair understanding, wife was present due to pts poor ability to give a good history of medical issues.     Assessment:   Initial Assessment (Pertinent finding, problem list and factors affecting outcome):This is a 57 y.o. male referred to outpatient physical therapy and presents with a medical diagnosis of ataxia s/p CVA and demonstrates limitations as described in the problem list. Due to pt's deficits, his falls frequently due to gait and balance deficits on top of L sided weakness. THis pt unfortunately has many other medical problems and co-morbities like MS, DMII, vasculitis and a compression fx in the spine after all of the falls.     History  Co-morbidities and personal factors that may impact the plan of care Examination  Body Structures and Functions, activity limitations and participation restrictions that may impact the plan of care Clinical Presentation   Decision Making/ Complexity Score   Co-morbidities:   DMII, MS, depression, CVA, vasculitis        Personal Factors:     high Body Regions:  UE, trunk, back, LE    Body Systems: musculoskeletal - posture, ROM, strength; neuromuscular - sensation, balance, gait      Activity limitations Participation Restrictions: decreased coordination, frequent falls, requires back brace for compression fx, unable to ambulate safely indep    high     FOTO ABC and CVA Survey: 68% average limitation    moderate   moderate       Rehab Potiential: fair  Pt will benefit from continuing skilled outpatient physical therapy to address the deficits listed  below in the problem list, provide pt/family education and to maximize pt's level of independence in the home and community environment.     Medical necessity is demonstrated by the following IMPAIRMENTS/PROMBLEM LIST:   1. Fall Risk - impaired balance   2. Weakness L sided  3. Impaired coordination  4. Gait deviations   5. Decreased community ambulation   6. Decreased activity tolerance   7. Difficulty to participate in daily activities   8. Continued inability to participate in vocational pursuits   9. Requires skilled supervision to complete and progress HEP     Anticipated barriers to physical therapy: depression and other medical illnessess.      Plans and Goals:   Short Term Goals = Long Term Goals(8 Weeks):   1. Pt and wife will be indep with initial HEP.   2. Pt will have MMT score of 4/5 in all major ms groups in B LE.   3. Patient will be able to achieve greater than or equal to 15/24 on the DGI using least restrictive device decreasing patient's risk for falling and improving patient to 40-60% impaired, limited, or restricted category.     4. Pt will become a safe community ambulator with least restrictive device with low risk of falls and minimal gait deviations.  5. Pt will complete 6 minute walk test with 0  ft without AD.    6. Pt will report 34% on the FOTO Functional Assessment  indicating increased functional balance and mobility.  7. Pt will report 75% on abbrve ABC assessment indicating increase in balance confidence.     Certification Period: 12/1/17 to 2/1/17  Recommended Treatment Plan: 1-2 times per week for 8 weeks:  To recieve Education, HEP, therapeutic exercises, neuromuscular re-education, therapeutic activities, manual therapy, joint mobilizations, and modalities modalities prn, ASTYM prn, kinesiotape prn, Functional Dry Needling prn modalities, ASTYM prn, kinesiotape prn, Functional Dry Needling prn to achieve established goals. Pt may be seen by PTA as part of the  rehabilitation team.     Other Recommendations: SLP for cognitive problems and OT eval and treat to address any UE impairments and ataxia and dyscoordination      Therapist: Tami Luong, PT    I CERTIFY THE NEED FOR THESE SERVICES FURNISHED UNDER THIS PLAN OF TREATMENT AND WHILE UNDER MY CARE    Physician's comments: ____________________________________________________________________________________________________________________________________________    Physician's Name: ___________________________________

## 2017-12-04 PROBLEM — R41.89 COGNITIVE DEFICITS: Status: ACTIVE | Noted: 2017-12-04

## 2017-12-04 PROBLEM — R49.0 DYSPHONIA: Status: ACTIVE | Noted: 2017-12-04

## 2017-12-04 PROBLEM — R47.01 APHASIA: Status: ACTIVE | Noted: 2017-12-04

## 2017-12-04 NOTE — PLAN OF CARE
Date: 12/04/2017     Start Time:  1300  Stop Time:  1345      OUTPATIENT NEUROLOGICAL REHABILITATION  SPEECH THERAPY EVALUATION    Subjective/History  Onset Date:  May 2017  Primary Diagnosis:   lacunar stroke, CNS vasculitis, MS  Treatment Diagnosis:  Aphasia, cognitive deficits, dysphonia.  1. Aphasia     2. Dysphonia     3. Cognitive deficits        Referring Provider:  Dr. Braydon Lowe  Orders: ST for evaluation and treat  Current Medical History:  Bessy Nunez presents to the [facility] secondary to the diagnosis of CNS vasculitis, MS, and lacunar stroke.  Pt and wife report diagnosis of CNS vasculitis and MS came in May 2017 followed by mini strokes.    1. Aphasia     2. Dysphonia     3. Cognitive deficits        Past Medical History:   Past Medical History:   Diagnosis Date    CNS vasculitis 6/2/2017    Follows with Dr. Love By mri.  Several active lesions, many old. 5/13: MRA brain/neck, MRI brain w/wo contrast show no vascular occlusion, multiple foci of hyperintensity in deep cerebral periventricular white matter in pattern of demyelinating process.  5/17: MRI spine performed, no spinal cord lesions. Hospitalization 6/2017. EEG was performed negative for seizures.  Repeat MRI showing new lesion, question whether this was demyelination versus CVA. 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 Findings compatible with a small acute lacunar infarct adjacent to the left frontal horn.  Nonspecific enhancement just inferior to this region and extending into the left basal ganglia, as well as within the inferior aspect of the left cerebellum.  No evidence of a focal mass. 2.  Extensive increased signal intensity involving the periventricular white  matter compatible with demyelinating disease versus vasculitis. 3.  Clinical correlation and followup recommended. 4.  This reportedly flattened in the EPIC and the corpus callosum medical record system.    Mixed hyperlipidemia 11/9/2012    NAFLD (nonalcoholic fatty liver disease) 10/11/2013    CHASE (nonalcoholic steatohepatitis)     Obesity     Stroke     Type 2 diabetes mellitus with diabetic polyneuropathy, with long-term current use of insulin 5/14/2017    Type 2 diabetes mellitus with hyperglycemia, with long-term current use of insulin 10/11/2013     Precautions:  Fall, aspiration  Prior Therapy:  None reported  Pain: 0 /10  Nutrition:  Regular and thin liquid diet.   Environmental Concerns/Cultural/Spiritual/Developmental/Educational Needs: None at this time.   Prior Level of Function: Independent  Social History:  Pt lives with wife in [place]. One child lives out of town. Education level includes one year of college. Pt was a  for [employer]. He stopped working in September 2016.   Signs of Abuse: No  Functional Deficits Leading to Referral/Nature of Injury:  Difficulty finding words, disorganized speech,   Patient/family Therapy Goals:  Improve speech    Objective   Auditory Comprehension:   Simple yes/no; 100% acc  Complex yes/no: 80% acc  Simple 1-3 step directions: WFL  Identify pictures by pointing with 80% acc (4/5)  Identify body parts by pointing with 100% acc (4/4)    Reading Comprehension:  Follow simple written directions with min A    Verbal Expression:  Repetition of multi-syllabic words with 100% acc  Repetition of 3-7 word sentences with 100% acc  Automatic speech acts: WNL  Object naming 5/5  Sentence completion 5/5  Responsive speech 10/10  Conversational speech: Short, delayed answers. Disorganized speech. Neologisms. Difficulty forming grammatically correct sentences in conversation.  Naming concrete category members: foods x 16 in 60 seconds (WNL); colors x 10 in 60  "seconds (below average).    Written Expression:  Pt wrote name and one short sentence in script; illegible.  Print is legible. Pt reports I can read it. It looks fine to me.     Cognition:  Behavior: alert, attentive  Insight: pt unaware of deficits. Pt reported no difficulty finding words, remembering things, writing, etc. Pt's wife silently disagreed with head nods during sessions.  For example, when pt was asked "do you have any difficulty finding your words?" Pt stated "No. I don't think so." Pt's wife shaking her head yes in the background.  Orientation: 5/7 impaired  Immediate recall of 5 numbers: WFL  Immediate recall of 7 numbers: impaired  Immediate recall of 4 words: WFL  Immediate recall of 5 words: impaired  Short term recall: repeat 3 words after 3-5 minutes: impaired  Long term recall: / Independently; 7/7 with 2 self correct; WFL. Delayed responses noted.  Reasonin% acc  Sequencing/organization: 25% acc Independently; 50% acc mod A  Problem Solvin% acc    Motor Speech/Fluency/Voice: Dysphonia c/b low volume, breathiness, harsh vocal quality. Pt reports that he runs out of breath when he is speaking. Pt reports that he voice is at baseline. Pt's wife reports that pt's voice is not at baseline.     Swallowing: No deficits reported. Pt and wife deny coughing, throat clearing, and wet vocal quality during meals. Deny globus sensation.     Hearing/Vision: No hearing aids. Denies difficulty hearing. Pt wears glasses for reading.    Assessment/Impressions  Mr. Nunez presents with mild receptive aphasia c/b difficulty following multi-step directions at times and difficulty following written directions. Mild-moderate expressive aphasia c/b disorganized speech, neologisms, Difficulty forming grammatically correct sentences in conversation.  Moderate dysphonia c/b low volume, breathiness, harsh vocal quality. Cognitive deficits c/b difficulty with orientation, immediate recall, reasoning, " sequencing/organization, problem solving.  No significant dysphagia noted.   Patient will benefit from outpatient neurological rehabilitation speech therapy.    Rehab Potential: good    Short Term Goals   1. Pt will name 15 concrete category members and 10 abstract category members in 60 seconds with min A to improve expressive language.   2. Pt will answer orientation questions with 90% acc given min A to improve cognition.  3. Pt will answer problem solving questions with 80% acc given mod A to improve cognition.  4. Pt will participate in mental manipulation tasks with 80% acc given mod A to improve cognition.  5. Pt will sequence 3 step picture cards with 80% acc given mod A to improve cognition.   6. Pt will describe pictures with 80% acc given min A to improve expressive language.  7. Pt will follow simple written directions with 80% acc given min A to improve reading comprehension.     Long Term Goals   1. Pt will name concrete and abstract category members Independently to improve expressive language.   2. Pt will answer orientation questions Independently to improve cognition.  3. Pt will answer problem solving questions, mental manipulation tasks, sequencing tasks with 90% acc given min A to improve cognition.  4. Pt will describe pictures with 90% acc Independently to improve expressive language.  5. Pt will follow simple written directions with 90% acc given min A to improve reading comprehension.     Education: Patient and family were educated on the plan of care. They verbalized and demonstrated understanding and agreed with the plan of care.     Plan  Certification Period: November 24, 2017 to December 31, 2017  Plan of Care Certification Period: 11/30/17 to 1/30/17    Recommended Treatment Plan:  Patient will participate in the [facility] program for speech therapy 2 times per week to address his  language/cognitive/speech deficits, educate patient/family, and home exercise  program.    Other Recommendations: ENT to evaluate vocal cord function.    Therapist's Name:   Carmencita Harman M.S.CCC-SLP  Speech Language Pathologist     Date: 12/04/2017    I certify the need for these services furnished under this plan of treatment and while under my care.  ____________________________________ Physician/Referring Practitioner   Date of Signature

## 2017-12-05 PROBLEM — R26.81 GAIT INSTABILITY: Status: ACTIVE | Noted: 2017-12-05

## 2017-12-05 PROBLEM — R29.6 FREQUENT FALLS: Status: ACTIVE | Noted: 2017-12-05

## 2017-12-05 PROBLEM — R53.1 LEFT-SIDED WEAKNESS: Status: ACTIVE | Noted: 2017-12-05

## 2017-12-05 PROBLEM — R26.89 BALANCE PROBLEMS: Status: ACTIVE | Noted: 2017-12-05

## 2017-12-12 ENCOUNTER — TELEPHONE (OUTPATIENT)
Dept: ENDOCRINOLOGY | Facility: CLINIC | Age: 57
End: 2017-12-12

## 2017-12-12 NOTE — TELEPHONE ENCOUNTER
Attempted to confirm appointment with patient but there was a no answer when I called, and could not leave a message.

## 2017-12-14 ENCOUNTER — OFFICE VISIT (OUTPATIENT)
Dept: HEMATOLOGY/ONCOLOGY | Facility: CLINIC | Age: 57
End: 2017-12-14
Payer: MEDICAID

## 2017-12-14 ENCOUNTER — INFUSION (OUTPATIENT)
Dept: INFUSION THERAPY | Facility: HOSPITAL | Age: 57
End: 2017-12-14
Attending: INTERNAL MEDICINE
Payer: MEDICAID

## 2017-12-14 VITALS
HEIGHT: 71 IN | HEART RATE: 95 BPM | OXYGEN SATURATION: 96 % | HEIGHT: 71 IN | SYSTOLIC BLOOD PRESSURE: 114 MMHG | DIASTOLIC BLOOD PRESSURE: 70 MMHG | TEMPERATURE: 98 F | WEIGHT: 222.44 LBS | BODY MASS INDEX: 31.14 KG/M2 | WEIGHT: 222.44 LBS | BODY MASS INDEX: 31.14 KG/M2 | RESPIRATION RATE: 16 BRPM | SYSTOLIC BLOOD PRESSURE: 113 MMHG | HEART RATE: 91 BPM | RESPIRATION RATE: 18 BRPM | DIASTOLIC BLOOD PRESSURE: 71 MMHG

## 2017-12-14 DIAGNOSIS — Z51.11 ENCOUNTER FOR CHEMOTHERAPY MANAGEMENT: Primary | ICD-10-CM

## 2017-12-14 DIAGNOSIS — I77.6 CNS VASCULITIS: Primary | ICD-10-CM

## 2017-12-14 PROCEDURE — 99214 OFFICE O/P EST MOD 30 MIN: CPT | Mod: S$PBB,,, | Performed by: INTERNAL MEDICINE

## 2017-12-14 PROCEDURE — 63600175 PHARM REV CODE 636 W HCPCS: Mod: JW | Performed by: INTERNAL MEDICINE

## 2017-12-14 PROCEDURE — 96413 CHEMO IV INFUSION 1 HR: CPT

## 2017-12-14 PROCEDURE — 96411 CHEMO IV PUSH ADDL DRUG: CPT

## 2017-12-14 PROCEDURE — 96367 TX/PROPH/DG ADDL SEQ IV INF: CPT

## 2017-12-14 PROCEDURE — 99213 OFFICE O/P EST LOW 20 MIN: CPT | Mod: PBBFAC,25 | Performed by: INTERNAL MEDICINE

## 2017-12-14 PROCEDURE — A4216 STERILE WATER/SALINE, 10 ML: HCPCS | Performed by: INTERNAL MEDICINE

## 2017-12-14 PROCEDURE — 25000003 PHARM REV CODE 250: Performed by: INTERNAL MEDICINE

## 2017-12-14 PROCEDURE — 99999 PR PBB SHADOW E&M-EST. PATIENT-LVL III: CPT | Mod: PBBFAC,,, | Performed by: INTERNAL MEDICINE

## 2017-12-14 RX ORDER — NAPROXEN SODIUM 220 MG
TABLET ORAL
Status: ON HOLD | COMMUNITY
Start: 2017-12-06 | End: 2018-10-20 | Stop reason: HOSPADM

## 2017-12-14 RX ORDER — SODIUM CHLORIDE 0.9 % (FLUSH) 0.9 %
10 SYRINGE (ML) INJECTION
Status: DISCONTINUED | OUTPATIENT
Start: 2017-12-14 | End: 2017-12-14 | Stop reason: HOSPADM

## 2017-12-14 RX ORDER — SODIUM CHLORIDE 0.9 % (FLUSH) 0.9 %
10 SYRINGE (ML) INJECTION
Status: CANCELLED | OUTPATIENT
Start: 2017-12-17

## 2017-12-14 RX ORDER — PEN NEEDLE, DIABETIC 32 GX 1/6"
NEEDLE, DISPOSABLE MISCELLANEOUS
COMMUNITY
Start: 2017-12-06 | End: 2019-04-22

## 2017-12-14 RX ORDER — HEPARIN 100 UNIT/ML
500 SYRINGE INTRAVENOUS
Status: CANCELLED | OUTPATIENT
Start: 2017-12-17

## 2017-12-14 RX ORDER — HEPARIN 100 UNIT/ML
500 SYRINGE INTRAVENOUS
Status: DISCONTINUED | OUTPATIENT
Start: 2017-12-14 | End: 2017-12-14 | Stop reason: HOSPADM

## 2017-12-14 RX ADMIN — SODIUM CHLORIDE 1320 MG: 900 INJECTION, SOLUTION INTRAVENOUS at 03:12

## 2017-12-14 RX ADMIN — SODIUM CHLORIDE, PRESERVATIVE FREE 10 ML: 5 INJECTION INTRAVENOUS at 05:12

## 2017-12-14 RX ADMIN — PALONOSETRON HYDROCHLORIDE: 0.25 INJECTION INTRAVENOUS at 03:12

## 2017-12-14 RX ADMIN — CYCLOPHOSPHAMIDE 1320 MG: 1 INJECTION, POWDER, FOR SOLUTION INTRAVENOUS; ORAL at 03:12

## 2017-12-14 NOTE — PLAN OF CARE
Problem: Patient Care Overview  Goal: Plan of Care Review  Outcome: Ongoing (interventions implemented as appropriate)  Pt tolerated tx without complications. VSS. No s/s of reaction. Instructed to contact MD with any questions. AVS given to patient.

## 2017-12-14 NOTE — PLAN OF CARE
Problem: Chemotherapy Effects (Adult)  Goal: Signs and Symptoms of Listed Potential Problems Will be Absent, Minimized or Managed (Chemotherapy Effects)  Signs and symptoms of listed potential problems will be absent, minimized or managed by discharge/transition of care (reference Chemotherapy Effects (Adult) CPG).   Outcome: Ongoing (interventions implemented as appropriate)  Pt here for D1C4 Cytoxan/mesna. VSS. No complaints voiced. Consent/labs/meds/allergies reviewed. PIV to right hand with blood return noted. All questions answered. Will continue to monitor.

## 2017-12-14 NOTE — PT/OT/SLP DISCHARGE
Occupational Therapy Discharge Summary    Bessy Nunez  MRN: [id number]   Principal Problem: Closed compression fracture of L4 lumbar vertebra      Patient Discharged from acute Occupational Therapy on 11/24/17.  Please refer to prior OT note dated 11/22/17 for functional status.    Assessment:      Patient appropriate for care in another setting.    Objective:     GOALS:    Occupational Therapy Goals        Problem: Occupational Therapy Goal    Goal Priority Disciplines Outcome Interventions   Occupational Therapy Goal     OT, PT/OT Ongoing (interventions implemented as appropriate)    Description:  Goals to be met by: 12/5/17     Patient will increase functional independence with ADLs by performing:    UE Dressing with Supervision, including LSO  LE Dressing with Stand-by Assistance and Assistive Devices as needed.  Grooming while standing at sink with Stand-by Assistance.  Toileting from toilet with Supervision for hygiene and clothing management.   Supine to sit with Modified [place].  Stand pivot transfers with Stand-by Assistance.  Toilet transfer to toilet with Stand-by Assistance.                      Reasons for Discontinuation of Therapy Services  Transfer to alternate level of care.      Plan:     Patient Discharged to: Home with Home Health Service    COLTON Charlton  12/14/2017  Pager: [phone]

## 2017-12-14 NOTE — PROGRESS NOTES
SECTION OF HEMATOLOGY AND BONE MARROW TRANSPLANT  New Patient Visit   12/16/2017  Referred by:  No ref. provider found  Referred for:     CHIEF COMPLAINT:   No chief complaint on file.      HISTORY OF PRESENT ILLNESS:   Presents for cycle #5 HD CTX for MS.   Per wife confusion stable from last appt.  Labs reviewed. Patient examined.  No contraindication to proceed with CTX.  Denies fever, chills, nightsweats, bleeding, brusing, lymphadenopathy, signs/symptoms of splenomegaly.    Neuro symptoms unchanged per wife.  Patient appears confused during appt.   PAST MEDICAL HISTORY:   Past Medical History:   Diagnosis Date    CNS vasculitis 6/2/2017    Follows with Dr. Love By mri.  Several active lesions, many old. 5/13: MRA brain/neck, MRI brain w/wo contrast show no vascular occlusion, multiple foci of hyperintensity in deep cerebral periventricular white matter in pattern of demyelinating process.  5/17: MRI spine performed, no spinal cord lesions. Hospitalization 6/2017. EEG was performed negative for seizures.  Repeat MRI showing new lesion, question whether this was demyelination versus CVA. 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 Findings compatible with a small acute lacunar infarct adjacent to the left frontal horn.  Nonspecific enhancement just inferior to this region and extending into the left basal ganglia, as well as within the inferior aspect of the left cerebellum.  No evidence of a focal mass. 2.  Extensive increased signal intensity involving the periventricular white matter compatible with demyelinating disease versus vasculitis. 3.  Clinical correlation and followup recommended. 4.  This reportedly flattened in the EPIC and the corpus callosum medical record system.    Mixed  "hyperlipidemia 11/9/2012    NAFLD (nonalcoholic fatty liver disease) 10/11/2013    CHASE (nonalcoholic steatohepatitis)     Obesity     Stroke     Type 2 diabetes mellitus with diabetic polyneuropathy, with long-term current use of insulin 5/14/2017    Type 2 diabetes mellitus with hyperglycemia, with long-term current use of insulin 10/11/2013       PAST SURGICAL HISTORY:   Past Surgical History:   Procedure Laterality Date    SKIN CANCER EXCISION         PAST SOCIAL HISTORY:   reports that he has never smoked. He has never used smokeless tobacco. He reports that he does not drink alcohol or use drugs.    FAMILY HISTORY:  Family History   Problem Relation Age of Onset    Heart disease Mother     Hypertension Mother     Heart failure Mother     Cancer Father      lung    Diabetes Maternal Aunt        CURRENT MEDICATIONS:   Current Outpatient Prescriptions   Medication Sig    aspirin (ECOTRIN) 81 MG EC tablet Take 81 mg by mouth once daily.    atenolol (TENORMIN) 50 MG tablet Take 1 tablet (50 mg total) by mouth once daily.    atorvastatin (LIPITOR) 40 MG tablet Take 1 tablet (40 mg total) by mouth once daily.    blood sugar diagnostic Strp To check BG 4 times daily, to use with insurance preferred meter    blood-glucose meter kit To check BG 4 times daily, one touch verio meter. Dx code e11.65    calcium carbonate (OS-DONNA) 500 mg calcium (1,250 mg) tablet Take 2 tablets (1,000 mg total) by mouth once.    diltiaZEM (DILACOR XR) 240 MG CDCR Take 1 capsule (240 mg total) by mouth once daily.    insulin glargine (BASAGLAR KWIKPEN) 100 unit/mL (3 mL) InPn pen Inject 32 Units into the skin 2 (two) times daily. Once in the morning at 9 am and once in the evening at 9 pm.    insulin lispro (HUMALOG) 100 unit/mL injection Inject 20 units before meals    insulin needles, disposable, (BD ULTRA-FINE ANA PEN NEEDLES) 32 x 5/32 " Ndle Inject twice daily    insulin syringe-needle U-100 (INSULIN SYRINGE) 1/2 " "mL 30 gauge x 5/16 Syrg Use 3x/day    insulin syringe-needle U-100 0.5 mL 31 gauge x 5/16 Syrg     lancets Misc To check BG 4 times daily, one touch verio meter. Dx code e11.65    levetiracetam (KEPPRA) 500 MG Tab Take 1 tablet (500 mg total) by mouth 2 (two) times daily.    liraglutide 0.6 mg/0.1 mL, 18 mg/3 mL, subq PNIJ (VICTOZA 3-TOMASZ) 0.6 mg/0.1 mL (18 mg/3 mL) PnIj Inject 1.8 mg into the skin once daily.    metformin (GLUCOPHAGE-XR) 500 MG 24 hr tablet Take 2 tablets (1,000 mg total) by mouth 2 (two) times daily with meals.    NOVOFINE PLUS 32 gauge x 1/6" Ndle     omega-3 fatty acids-vitamin E (FISH OIL) 1,000 mg Cap Take 1 capsule by mouth 2 (two) times daily.    ONETOUCH DELICA LANCETS 33 gauge Misc     ranitidine (ZANTAC) 150 MG tablet Take 1 tablet (150 mg total) by mouth 2 (two) times daily.    sertraline (ZOLOFT) 100 MG tablet 1 and half tablet daily (Patient taking differently: Take 150 mg by mouth once daily. )    sulfamethoxazole-trimethoprim 800-160mg (BACTRIM DS) 800-160 mg Tab Take every Monday, Wednesday, and Friday.  Infectious Disease will decide if this is needed in 3-4 months.    valsartan (DIOVAN) 320 MG tablet Take 1 tablet (320 mg total) by mouth once daily.    vitamin D 1000 units Tab Take 5 tablets (5,000 Units total) by mouth once daily.     No current facility-administered medications for this visit.      ALLERGIES:   Review of patient's allergies indicates:  No Known Allergies          REVIEW OF SYSTEMS:   General ROS: negative  Psychological ROS: negative  Ophthalmic ROS: negative  ENT ROS: negative  Allergy and Immunology ROS: negative  Hematological and Lymphatic ROS: negative  Endocrine ROS: negative  Respiratory ROS: negative  Cardiovascular ROS: negative  Gastrointestinal ROS: negative  Genito-Urinary ROS: negative  Musculoskeletal ROS: negative  Neurological ROS: see HPI  Dermatological ROS: negative    PHYSICAL EXAM:   Vitals:    12/14/17 1400   BP: 113/70   Pulse: " 95   Resp: 16   Temp: 98.2 °F (36.8 °C)       General - well developed, well nourished, no apparent distress  Head & Face - no sinus tenderness  Eyes - normal conjunctivae and lids   ENT - normal external auditory canals and tympanic membranes bilaterally oropharynx clear,  Normal dentition and gums  Neck - normal thyroid  Chest and Lung - normal respiratory effort, clear to auscultation bilaterally   Cardiovascular - RRR with no MGR, normal S1 and S2; no pedal edema  Abdomen -  soft, nontender, no palpable hepatomegaly or splenomegaly  Lymph - no palpable lymphadenopathy  Extremities - unremarkable nails and digits  Heme - no bruising, petechiae, pallor  Skin - no rashes or lesions  Psych - appropriate mood and affect      ECOG Performance Status: (foot note - ECOG PS provided by [employer] Co[employer]cology [employer]) 1 - Symptomatic but completely ambulatory    Karnofsky Performance Score:  90%- Able to Carry on Normal Activity: Minor Symptoms of Disease  DATA:   Lab Results   Component Value Date    WBC 11.98 12/14/2017    HGB 11.8 (L) 12/14/2017    HCT 35.0 (L) 12/14/2017    MCV 85 12/14/2017     12/14/2017     Gran #   Date Value Ref Range Status   12/14/2017 8.9 (H) 1.8 - 7.7 K/uL Final     Gran%   Date Value Ref Range Status   12/14/2017 74.5 (H) 38.0 - 73.0 % Final     CMP  Sodium   Date Value Ref Range Status   12/14/2017 141 136 - 145 mmol/L Final     Potassium   Date Value Ref Range Status   12/14/2017 4.2 3.5 - 5.1 mmol/L Final     Chloride   Date Value Ref Range Status   12/14/2017 106 95 - 110 mmol/L Final     CO2   Date Value Ref Range Status   12/14/2017 25 23 - 29 mmol/L Final     Glucose   Date Value Ref Range Status   12/14/2017 159 (H) 70 - 110 mg/dL Final     BUN, Bld   Date Value Ref Range Status   12/14/2017 24 (H) 6 - 20 mg/dL Final     Creatinine   Date Value Ref Range Status   12/14/2017 1.3 0.5 - 1.4 mg/dL Final     Calcium   Date Value Ref Range Status   12/14/2017 10.3 8.7 - 10.5  mg/dL Final     Total Protein   Date Value Ref Range Status   12/14/2017 7.1 6.0 - 8.4 g/dL Final     Albumin   Date Value Ref Range Status   12/14/2017 3.7 3.5 - 5.2 g/dL Final   10/11/2013 4.3 3.6 - 5.1 g/dL Final     Comment:     @ Test Performed By:  [employer] Vera AMELIA Almazan M.D.,   [address] [place] [id number]  [facility] #[id number]     Total Bilirubin   Date Value Ref Range Status   12/14/2017 0.5 0.1 - 1.0 mg/dL Final     Comment:     For infants and newborns, interpretation of results should be based  on gestational age, weight and in agreement with clinical  observations.  Premature Infant recommended reference ranges:  Up to 24 hours.............<8.0 mg/dL  Up to 48 hours............<12.0 mg/dL  3-5 days..................<15.0 mg/dL  6-29 days.................<15.0 mg/dL       Alkaline Phosphatase   Date Value Ref Range Status   12/14/2017 87 55 - 135 U/L Final     AST   Date Value Ref Range Status   12/14/2017 20 10 - 40 U/L Final     ALT   Date Value Ref Range Status   12/14/2017 22 10 - 44 U/L Final     Anion Gap   Date Value Ref Range Status   12/14/2017 10 8 - 16 mmol/L Final     eGFR if    Date Value Ref Range Status   12/14/2017 >60.0 >60 mL/min/1.73 m^2 Final     eGFR if non    Date Value Ref Range Status   12/14/2017 59.7 (A) >60 mL/min/1.73 m^2 Final     Comment:     Calculation used to obtain the estimated glomerular filtration  rate (eGFR) is the CKD-EPI equation.            ASSESSMENT AND PLAN:   Encounter Diagnosis   Name Primary?    Encounter for chemotherapy management Yes     -proceed with HDCTX 600mg/m2 dose #5  with mesna support per MS protocol per Dr. Love  -per neurology, plan for approximate 1 year of monthly therapy  -he has some likely chemo induced mild cytopenias and hood; recommend monitoring with serial labs to ensure does not need transfusions for chemotherapy induced  cytopenias    Follow Up:  Cbc, cmp, type and screen lab only in 2 weeks  -same labs, MD fine, chair for cytoxan infusion in 4 weeks   Doron Goldstein MD  Hematology/Oncology/Bone Marrow Transplant

## 2017-12-21 ENCOUNTER — CLINICAL SUPPORT (OUTPATIENT)
Dept: REHABILITATION | Facility: HOSPITAL | Age: 57
End: 2017-12-21
Payer: MEDICAID

## 2017-12-21 DIAGNOSIS — R41.89 COGNITIVE DEFICITS: ICD-10-CM

## 2017-12-21 DIAGNOSIS — R26.81 GAIT INSTABILITY: ICD-10-CM

## 2017-12-21 DIAGNOSIS — R49.0 DYSPHONIA: ICD-10-CM

## 2017-12-21 DIAGNOSIS — R47.01 APHASIA: ICD-10-CM

## 2017-12-21 DIAGNOSIS — R53.1 LEFT-SIDED WEAKNESS: ICD-10-CM

## 2017-12-21 DIAGNOSIS — R26.89 BALANCE PROBLEMS: ICD-10-CM

## 2017-12-21 DIAGNOSIS — R29.6 FREQUENT FALLS: ICD-10-CM

## 2017-12-21 PROCEDURE — 97532 *HC SP COG SKL DEV EA 15 MIN: CPT | Mod: PN

## 2017-12-21 PROCEDURE — 92507 TX SP LANG VOICE COMM INDIV: CPT | Mod: PN

## 2017-12-21 PROCEDURE — 97110 THERAPEUTIC EXERCISES: CPT | Mod: 59,PN

## 2017-12-21 NOTE — PROGRESS NOTES
OUTPATIENT REHABILITATION  SPEECH THERAPY PROGRESS NOTE    Date:  12/21/2017    Start Time:  1445  Stop Time:  1530    Subjective/History  Onset Date:  May 2017  Primary Diagnosis:   lacunar stroke, CNS vasculitis, MS  Treatment Diagnosis:  Aphasia, cognitive deficits, dysphonia.    Referring Provider:  Dr. Braydon Lowe  Orders: ST for evaluation and treat  Current Medical History:  Bessy Nunez presents to the [facility] secondary to the diagnosis of CNS vasculitis, MS, and lacunar stroke.  Pt and wife report diagnosis of CNS vasculitis and MS came in May 2017 followed by mini tali.      Past Medical History:   Past Medical History:   Diagnosis Date    CNS vasculitis 6/2/2017    Follows with Dr. Love By mri.  Several active lesions, many old. 5/13: MRA brain/neck, MRI brain w/wo contrast show no vascular occlusion, multiple foci of hyperintensity in deep cerebral periventricular white matter in pattern of demyelinating process.  5/17: MRI spine performed, no spinal cord lesions. Hospitalization 6/2017. EEG was performed negative for seizures.  Repeat MRI showing new lesion, question whether this was demyelination versus CVA. 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 Findings compatible with a small acute lacunar infarct adjacent to the left frontal horn.  Nonspecific enhancement just inferior to this region and extending into the left basal ganglia, as well as within the inferior aspect of the left cerebellum.  No evidence of a focal mass. 2.  Extensive increased signal intensity involving the periventricular white matter compatible with demyelinating disease versus vasculitis. 3.  Clinical correlation and followup recommended. 4.  This  reportedly flattened in the EPIC and the corpus callosum medical record system.    Mixed hyperlipidemia 11/9/2012    NAFLD (nonalcoholic fatty liver disease) 10/11/2013    CHASE (nonalcoholic steatohepatitis)     Obesity     Stroke     Type 2 diabetes mellitus with diabetic polyneuropathy, with long-term current use of insulin 5/14/2017    Type 2 diabetes mellitus with hyperglycemia, with long-term current use of insulin 10/11/2013       TIMED  Procedure Min.   Cognitive tx  30 minutes                     UNTIMED  Procedure Min.   Speech/lang tx  15 minutes         Total Minutes: 45  Total Timed Units: 3  Total Untimed Units: 0  Charges Billed/# of units: 3    Visit #: 2  Date of Evaluation: 12/5/17  Plan of Care Expiration:   2/5/18  Extended POC: Dec 5, 2017 to Feb 5, 2018    Progress/Current Status    Subjective:     Pain: 0 /10  No pain. No complaints. Pleasant.     Objective:     1. Pt will name 15 concrete category members and 10 abstract category members in 60 seconds with min A to improve expressive language.   -Animals x 9 Independently in 60 seconds; x 20 min A untimed  -bathroom x 6 Independently in 60 seconds; x 13 min A untimed   -kitchen x 10 Independently in 60 seconds; x 16 min A untimed  Category matrix 75% acc (12/16) Independently     2. Pt will answer orientation questions with 90% acc given min A to improve cognition.  70% acc Independently (7/10) ; 80% acc with one self correction (8/10)    3. Pt will answer problem solving questions with 80% acc given mod A to improve cognition.  -Not addressed this session.     4. Pt will participate in mental manipulation tasks with 80% acc given mod A to improve cognition.  -Not addressed this session.     5. Pt will sequence 3 step picture cards with 80% acc given mod A to improve cognition.   -sequencing 4 step picture cards with 20% acc Independently; 40% acc min A    6. Pt will describe pictures with 80% acc given min A to improve expressive  language.  -90% acc    7. Pt will follow simple written directions with 80% acc given min A to improve reading comprehension.   75% acc Independently (6/8)    Assessment:     Mr. Nunez presents with mild receptive aphasia c/b difficulty following multi-step directions at times and difficulty following written directions. Mild-moderate expressive aphasia c/b disorganized speech, neologisms, Difficulty forming grammatically correct sentences in conversation.  Moderate dysphonia c/b low volume, breathiness, harsh vocal quality. Cognitive deficits c/b difficulty with orientation, immediate recall, reasoning, sequencing/organization, problem solving.  No significant dysphagia noted.   Patient will benefit from outpatient neurological rehabilitation speech therapy.    Patient Education/Response:     Educated on goals and plan of care. Pt reported understanding. Home program: following simple written directions.     Plans and Goals:     Continue POC.   Updated POC due 2/5/18.     Carmencita Harman M.S.CCC-SLP  Speech Language Pathologist

## 2017-12-21 NOTE — PROGRESS NOTES
TIME RECORD    Date: 12/21/2017      Start Time:  1400  Stop Time:  1445    PROCEDURES:    TIMED  Procedure Min.   TE 45                     UNTIMED  Procedure Min.             Total Timed Minutes:  45  Total Timed Units:  3  Total Untimed Units:  0  Charges Billed/# of units:  3 TE      Progress/Current Status    Subjective:     Patient ID: Bessy Nunez is a 57 y.o. male.  Diagnosis:   1. Frequent falls     2. Balance problems     3. Gait instability     4. Left-sided weakness       Pain: 0 /10  Pt presented to session with TLSO brace on.  No new complaints today.    Objective:     Session initiated with Neuro re-ed and endurance training with B UE/LE extremities for reciprocal motion of all limbs on sci-fit x 8' min at level 2 at > or equal to 50 spm  w/o rest. Pt performed TE per log x 35' with PT 1:1.         DATE 12/21/17   Visit 2   G CODE  POC exp 2/10/17 2/10   FOTO 2/5   Standing feet apart --   Standing feet together --   Standing 1/2 tandem --   Standing tandem --   SLS R --   SLS L --   Side stepping 4 laps //   Cross overs --   [place] --   Wii fit balance --   Bosu --   Beep board --   Physioball sitting balance --   SLR 2 x 15 B   clamshells Supine 2 x 10 B RTB   Hip abduction --   bridges Hold compression fx   Adduction isometric 5'' x 15   LAQ 2 x 15 B   HS curls 2 x 15 PTB   HS stretch with strap MT   Gastroc stretch --   Leg press --   Hip extension 2 x 10 B   Hip flexion 2 x 10 B   Hip Abduction 2 x 10 B   Hip adduction --   Knee flexion --   Toe raises --   Heel raises --   Nu-step 8' L2   Step ups --   Lateral step ups --   INITIALS FS     Assessment:     Pt is very flat during session but he is cooperative and would cont to benefit from PT to increase strength and balance.     Patient Education/Response:     CONT HEP    Plans and Goals:     Short Term Goals = Long Term Goals(8 Weeks):   1. Pt and wife will be indep with initial HEP.   2. Pt will have MMT score of 4/5 in all major ms groups in  B LE.   3. Patient will be able to achieve greater than or equal to 15/24 on the DGI using least restrictive device decreasing patient's risk for falling and improving patient to 40-60% impaired, limited, or restricted category.     4. Pt will become a safe community ambulator with least restrictive device with low risk of falls and minimal gait deviations.  5. Pt will complete 6 minute walk test with 0  ft without AD.    6. Pt will report 34% on the FOTO Functional Assessment  indicating increased functional balance and mobility.  7. Pt will report 75% on abbrve ABC assessment indicating increase in balance confidence.

## 2017-12-26 ENCOUNTER — CLINICAL SUPPORT (OUTPATIENT)
Dept: REHABILITATION | Facility: HOSPITAL | Age: 57
End: 2017-12-26
Payer: MEDICAID

## 2017-12-26 ENCOUNTER — TELEPHONE (OUTPATIENT)
Dept: HEMATOLOGY/ONCOLOGY | Facility: CLINIC | Age: 57
End: 2017-12-26

## 2017-12-26 DIAGNOSIS — R26.89 BALANCE PROBLEMS: ICD-10-CM

## 2017-12-26 DIAGNOSIS — R29.6 FREQUENT FALLS: ICD-10-CM

## 2017-12-26 DIAGNOSIS — R53.1 LEFT-SIDED WEAKNESS: ICD-10-CM

## 2017-12-26 DIAGNOSIS — R26.81 GAIT INSTABILITY: ICD-10-CM

## 2017-12-26 DIAGNOSIS — R47.01 APHASIA: ICD-10-CM

## 2017-12-26 DIAGNOSIS — R41.89 COGNITIVE DEFICITS: ICD-10-CM

## 2017-12-26 DIAGNOSIS — R49.0 DYSPHONIA: ICD-10-CM

## 2017-12-26 PROCEDURE — 92507 TX SP LANG VOICE COMM INDIV: CPT | Mod: PN

## 2017-12-26 PROCEDURE — 97532 *HC SP COG SKL DEV EA 15 MIN: CPT | Mod: PN

## 2017-12-26 PROCEDURE — 97110 THERAPEUTIC EXERCISES: CPT | Mod: PN

## 2017-12-26 RX ORDER — HEPARIN 100 UNIT/ML
500 SYRINGE INTRAVENOUS
Status: CANCELLED | OUTPATIENT
Start: 2018-01-16

## 2017-12-26 RX ORDER — SODIUM CHLORIDE 0.9 % (FLUSH) 0.9 %
10 SYRINGE (ML) INJECTION
Status: CANCELLED | OUTPATIENT
Start: 2018-01-16

## 2017-12-26 NOTE — TELEPHONE ENCOUNTER
----- Message from Mya Trinidad sent at 12/26/2017  3:40 PM CST -----  Contact: Pt  Pt calling regarding lab appt on 12/29. Pt would like to move it to 12/28        Pt call back number [phone]

## 2017-12-26 NOTE — PROGRESS NOTES
OUTPATIENT REHABILITATION  SPEECH THERAPY PROGRESS NOTE    Date:  12/26/2017    Start Time:  1615  Stop Time:  1700    Subjective/History  Onset Date:  May 2017  Primary Diagnosis:   lacunar stroke, CNS vasculitis, MS  Treatment Diagnosis:  Aphasia, cognitive deficits, dysphonia.    Referring Provider:  Dr. Braydon Lowe  Orders: ST for evaluation and treat  Current Medical History:  Bessy Nunez presents to the [facility] secondary to the diagnosis of CNS vasculitis, MS, and lacunar stroke.  Pt and wife report diagnosis of CNS vasculitis and MS came in May 2017 followed by mini tali.      Past Medical History:   Past Medical History:   Diagnosis Date    CNS vasculitis 6/2/2017    Follows with Dr. Love By mri.  Several active lesions, many old. 5/13: MRA brain/neck, MRI brain w/wo contrast show no vascular occlusion, multiple foci of hyperintensity in deep cerebral periventricular white matter in pattern of demyelinating process.  5/17: MRI spine performed, no spinal cord lesions. Hospitalization 6/2017. EEG was performed negative for seizures.  Repeat MRI showing new lesion, question whether this was demyelination versus CVA. 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 Findings compatible with a small acute lacunar infarct adjacent to the left frontal horn.  Nonspecific enhancement just inferior to this region and extending into the left basal ganglia, as well as within the inferior aspect of the left cerebellum.  No evidence of a focal mass. 2.  Extensive increased signal intensity involving the periventricular white matter compatible with demyelinating disease versus vasculitis. 3.  Clinical correlation and followup recommended. 4.  This  reportedly flattened in the EPIC and the corpus callosum medical record system.    Mixed hyperlipidemia 11/9/2012    NAFLD (nonalcoholic fatty liver disease) 10/11/2013    CHASE (nonalcoholic steatohepatitis)     Obesity     Stroke     Type 2 diabetes mellitus with diabetic polyneuropathy, with long-term current use of insulin 5/14/2017    Type 2 diabetes mellitus with hyperglycemia, with long-term current use of insulin 10/11/2013       TIMED  Procedure Min.   Cognitive tx  30 minutes                     UNTIMED  Procedure Min.   Speech/lang tx  15 minutes         Total Minutes: 45  Total Timed Units: 3  Total Untimed Units: 0  Charges Billed/# of units: 3    Visit #: 3  Date of Evaluation: 12/5/17  Plan of Care Expiration:   2/5/18  Extended POC: Dec 5, 2017 to Feb 5, 2018    Progress/Current Status    Subjective:     Pain: 0 /10  No pain. No complaints. Pleasant.     Objective:     1. Pt will name 15 concrete category members and 10 abstract category members in 60 seconds with min A to improve expressive language.   [place] x 3 Independently; x 12 mod A  Toys x 4 Independently; x 9 min-mod A  [place]ities x 13 Independently; x 19 min A   Category matrix 75% acc (12/16)    2. Pt will answer orientation questions with 90% acc given min A to improve cognition.  90% acc Independently     3. Pt will answer problem solving questions with 80% acc given mod A to improve cognition.  -Not addressed this session.     4. Pt will participate in mental manipulation tasks with 80% acc given mod A to improve cognition.  -Not addressed this session.     5. Pt will sequence 3 step picture cards with 80% acc given mod A to improve cognition.   -Not addressed this session.     6. Pt will describe pictures with 80% acc given min A to improve expressive language.  --Not addressed this session.     7. Pt will follow simple written directions with 80% acc given min A to improve reading comprehension.   50% acc Independently  (4/8)  100% acc min A (8/8)    Assessment:     Mr. Nunez presents with mild receptive aphasia c/b difficulty following multi-step directions at times and difficulty following written directions. Mild-moderate expressive aphasia c/b disorganized speech, neologisms, Difficulty forming grammatically correct sentences in conversation.  Moderate dysphonia c/b low volume, breathiness, harsh vocal quality. Cognitive deficits c/b difficulty with orientation, immediate recall, reasoning, sequencing/organization, problem solving.  No significant dysphagia noted.   Patient will benefit from outpatient neurological rehabilitation speech therapy.    Patient Education/Response:     Educated on goals and plan of care. Pt reported understanding. Home program: category matrix. Provided a list of activities that he can do with wife/son at home including reading the newspaper, playing card games, watching and participating in television shows like family feud, wheel of fortune, and 7-bites for word finding.     Plans and Goals:     Continue POC.   Updated POC due 2/5/18.     Carmencita Harman M.S.CCC-SLP  Speech Language Pathologist

## 2017-12-26 NOTE — PROGRESS NOTES
"TIME RECORD    Date: 12/26/2017      Start Time:  5:00  Stop Time:  5:45    PROCEDURES:    TIMED  Procedure Min.   TE 45                     UNTIMED  Procedure Min.             Total Timed Minutes:  45  Total Timed Units:  3  Total Untimed Units:  0  Charges Billed/# of units:  3 TE      Progress/Current Status    Subjective:     Patient ID: Bessy Nunez is a 57 y.o. male.  Diagnosis:   1. Frequent falls     2. Balance problems     3. Gait instability     4. Left-sided weakness       Pain: 0 /10  Pt presented from [facility] with no new complaints. He did not have his TSLO on today and when asked why he said "i dont know".    Objective:     Session initiated  Neuro re-ed and endurance training with B UE/LE extremities for reciprocal motion of all limbs on sci-fit x 10 min at level L2.5 at > or equal to 50 spm  w/o rest. with TE per log x 25' f/b standing balance training per log  15' with PT 1:1.      DATE 12/26/17 12/21/17   Visit 3 2   G CODE  POC exp 2/10/17 3/10 2/10   FOTO 3/5 2/5   Standing feet apart -- --   Standing feet together -- --   Standing 1/2 tandem - --   Standing tandem -- --   SLS R -- --   SLS L -- --   Side stepping 2 laps  20' in gym 4 laps //   Cross overs -- --   Step overs -- 1 lap length of orange tiles   Wii fit balance -- --   Bosu -- --   Beep board -- --   Physioball sitting balance -- --   SLR NT no brace 2 x 15 B   clamshells NT no brace Supine 2 x 10 B RTB   Hip abduction -- --   bridges NT no brace Hold compression fx   Adduction isometric NT no brace 5'' x 15   LAQ 2 x 15 2# 2 x 15 B   HS curls 2 x 15 PTB 2 x 15 PTB   HS stretch with strap NT no brace on  MT   Gastroc stretch 3 x 30'' on fitter --   Leg press -- --   Hip extension 2 x 10 B 2 x 10 B   Hip flexion 2 x 10 B 2 x 10 B   Hip Abduction 2 x 10 B 2 x 10 B   Hip adduction -- --   Knee flexion -- --   Toe raises -- --   Heel raises  x 20 B --   Nu-step 8' L2 8' L2   Step ups X 20 --   Lateral step ups X 20 --   INITIALS FS FS "       Assessment:     Pt stated that he feels like therapy is making a difference.  Pt will cont to benefit from PT to increase mobility.    Patient Education/Response:     CONT HEP    Plans and Goals:     Short Term Goals = Long Term Goals(8 Weeks)  1. Pt and wife will be indep with initial HEP.   2. Pt will have MMT score of 4/5 in all major ms groups in B LE.   3. Patient will be able to achieve greater than or equal to 15/24 on the DGI using least restrictive device decreasing patient's risk for falling and improving patient to 40-60% impaired, limited, or restricted category.     4. Pt will become a safe community ambulator with least restrictive device with low risk of falls and minimal gait deviations.  5. Pt will complete 6 minute walk test with 0  ft without AD.    6. Pt will report 34% on the FOTO Functional Assessment  indicating increased functional balance and mobility.  7. Pt will report 75% on abbrve ABC assessment indicating increase in balance confidence.

## 2017-12-28 ENCOUNTER — OFFICE VISIT (OUTPATIENT)
Dept: NEUROLOGY | Facility: CLINIC | Age: 57
End: 2017-12-28
Payer: MEDICAID

## 2017-12-28 ENCOUNTER — LAB VISIT (OUTPATIENT)
Dept: LAB | Facility: HOSPITAL | Age: 57
End: 2017-12-28
Attending: INTERNAL MEDICINE
Payer: MEDICAID

## 2017-12-28 VITALS
SYSTOLIC BLOOD PRESSURE: 99 MMHG | WEIGHT: 225 LBS | HEIGHT: 71 IN | BODY MASS INDEX: 31.5 KG/M2 | HEART RATE: 82 BPM | DIASTOLIC BLOOD PRESSURE: 65 MMHG

## 2017-12-28 DIAGNOSIS — Z71.89 COUNSELING REGARDING GOALS OF CARE: ICD-10-CM

## 2017-12-28 DIAGNOSIS — R26.89 POOR BALANCE: ICD-10-CM

## 2017-12-28 DIAGNOSIS — Z79.899 HIGH RISK MEDICATION USE: ICD-10-CM

## 2017-12-28 DIAGNOSIS — D72.819 LEUKOPENIA, UNSPECIFIED TYPE: ICD-10-CM

## 2017-12-28 DIAGNOSIS — F32.A DEPRESSION, UNSPECIFIED DEPRESSION TYPE: ICD-10-CM

## 2017-12-28 DIAGNOSIS — D70.1 CHEMOTHERAPY-INDUCED NEUTROPENIA: ICD-10-CM

## 2017-12-28 DIAGNOSIS — T45.1X5A CHEMOTHERAPY-INDUCED NEUTROPENIA: ICD-10-CM

## 2017-12-28 DIAGNOSIS — I77.6 CNS VASCULITIS: Primary | ICD-10-CM

## 2017-12-28 DIAGNOSIS — S32.001S LUMBAR BURST FRACTURE, SEQUELA: ICD-10-CM

## 2017-12-28 LAB
ABO + RH BLD: NORMAL
ALBUMIN SERPL BCP-MCNC: 3.5 G/DL
ALP SERPL-CCNC: 62 U/L
ALT SERPL W/O P-5'-P-CCNC: 21 U/L
ANION GAP SERPL CALC-SCNC: 12 MMOL/L
AST SERPL-CCNC: 16 U/L
BASOPHILS # BLD AUTO: 0.02 K/UL
BASOPHILS NFR BLD: 0.6 %
BILIRUB SERPL-MCNC: 0.5 MG/DL
BLD GP AB SCN CELLS X3 SERPL QL: NORMAL
BUN SERPL-MCNC: 15 MG/DL
CALCIUM SERPL-MCNC: 9.5 MG/DL
CHLORIDE SERPL-SCNC: 108 MMOL/L
CO2 SERPL-SCNC: 25 MMOL/L
CREAT SERPL-MCNC: 1.2 MG/DL
DIFFERENTIAL METHOD: ABNORMAL
EOSINOPHIL # BLD AUTO: 0.1 K/UL
EOSINOPHIL NFR BLD: 3.2 %
ERYTHROCYTE [DISTWIDTH] IN BLOOD BY AUTOMATED COUNT: 14.4 %
EST. GFR  (AFRICAN AMERICAN): >60 ML/MIN/1.73 M^2
EST. GFR  (NON AFRICAN AMERICAN): >60 ML/MIN/1.73 M^2
GLUCOSE SERPL-MCNC: 126 MG/DL
HCT VFR BLD AUTO: 33.2 %
HGB BLD-MCNC: 11.1 G/DL
IMM GRANULOCYTES # BLD AUTO: 0.03 K/UL
IMM GRANULOCYTES NFR BLD AUTO: 0.9 %
LYMPHOCYTES # BLD AUTO: 1 K/UL
LYMPHOCYTES NFR BLD: 32.5 %
MCH RBC QN AUTO: 28.4 PG
MCHC RBC AUTO-ENTMCNC: 33.4 G/DL
MCV RBC AUTO: 85 FL
MONOCYTES # BLD AUTO: 0.5 K/UL
MONOCYTES NFR BLD: 15.8 %
NEUTROPHILS # BLD AUTO: 1.5 K/UL
NEUTROPHILS NFR BLD: 47 %
NRBC BLD-RTO: 0 /100 WBC
PLATELET # BLD AUTO: 179 K/UL
PMV BLD AUTO: 9 FL
POTASSIUM SERPL-SCNC: 4.5 MMOL/L
PROT SERPL-MCNC: 6.7 G/DL
RBC # BLD AUTO: 3.91 M/UL
SODIUM SERPL-SCNC: 145 MMOL/L
WBC # BLD AUTO: 3.17 K/UL

## 2017-12-28 PROCEDURE — 85025 COMPLETE CBC W/AUTO DIFF WBC: CPT

## 2017-12-28 PROCEDURE — 80053 COMPREHEN METABOLIC PANEL: CPT

## 2017-12-28 PROCEDURE — 86850 RBC ANTIBODY SCREEN: CPT

## 2017-12-28 PROCEDURE — 99999 PR PBB SHADOW E&M-EST. PATIENT-LVL V: CPT | Mod: PBBFAC,,, | Performed by: CLINICAL NURSE SPECIALIST

## 2017-12-28 PROCEDURE — 99215 OFFICE O/P EST HI 40 MIN: CPT | Mod: S$PBB,,, | Performed by: CLINICAL NURSE SPECIALIST

## 2017-12-28 PROCEDURE — 99215 OFFICE O/P EST HI 40 MIN: CPT | Mod: PBBFAC | Performed by: CLINICAL NURSE SPECIALIST

## 2017-12-28 RX ORDER — ARIPIPRAZOLE 5 MG/1
5 TABLET ORAL DAILY
Qty: 30 TABLET | Refills: 3 | Status: SHIPPED | OUTPATIENT
Start: 2017-12-28 | End: 2018-02-19

## 2017-12-28 NOTE — PROGRESS NOTES
"  Subjective:       Patient ID: Bessy Nunez is a 57 y.o. male who presents today for a routine clinic visit for CNS vasculitis; he is accompanied by his wife and son.  He was last seen in October 2017.     Since last visit, he had a fall and hit his back against a brick wall (mid-November). He presented to ED. X-rays did not show significant fracture, and he was discharged. After ongoing pain and gait instability, Dr. Love ordered MRI of the lumbar spine, which showed "acute burst type fracture of the L4 vertebra with fracture line extending to the posterior cortex of the L4 vertebra." He was advised to go to the ER for neurosurgery eval. No surgical intervention was recommended. He is currently wearing a back brace. He denies being in any significant pain, but certain positions are more painful than others. He has not had any falls since early November.     He has recently started speech therapy and occupational therapy as outpatient.   The patient's wife states that he has qualified for long-term home health, and she plans to pick a provider soon so that it can be started.    His son reports that the patient has loud outbursts at times.   His wife reports that the patient lacks motivation to do things. He sometimes does not get up to go to the bathroom and will have an accident.   He denies urinary retention. He does not currently feel constipated. He denies any muscle spasms.     He continues to receive monthly Cytoxan treatments for CNS vasculitis. Rght after Cytoxan, he feels better. A few days after his infusion, though, his wife feels that he is more foggy and stumbles more.     Overall, pt's wife feels that his cognition is stable. She has not seen continued improvements. He can walk without a cane. To be remembered is that infarct was seen on brain MRI done in September. He was admitted after this finding and evaluated by stroke service.        SOCIAL HISTORY          Social History   Substance Use " Topics    Smoking status: Never Smoker    Smokeless tobacco: Never Used    Alcohol use No      Living arrangements - the patient lives with his spouse         Objective:      Neurologic Exam     Today, pt is oriented to person, place and time. He correctly identified the day of the week, date, month, and year. He identifies that the next holiday is New Year's Ashley. He is correctly able to spell WORLD backwards.   Does not ask questions--docile affect    Extraocular movements are intact.   Facial movements are normal. He has normal strength in upper and lower extremities. Rapid sequential movements are normal in the upper extremities.   Reflexes are 2+ and symmetric throughout.   Gait is steady (does not use a cane today).   Romberg is positive.   Finger to nose coordination is normal.   He is able to follow 3 step commands.   Labs:            Lab Results   Component Value Date     WBC 8.21 10/18/2017     HGB 11.2 (L) 10/18/2017     HCT 32.9 (L) 10/18/2017     MCV 84 10/18/2017      (L) 10/18/2017      CMP        Sodium   Date Value Ref Range Status   10/18/2017 140 136 - 145 mmol/L Final            Potassium   Date Value Ref Range Status   10/18/2017 4.5 3.5 - 5.1 mmol/L Final            Chloride   Date Value Ref Range Status   10/18/2017 107 95 - 110 mmol/L Final            CO2   Date Value Ref Range Status   10/18/2017 26 23 - 29 mmol/L Final            Glucose   Date Value Ref Range Status   10/18/2017 168 (H) 70 - 110 mg/dL Final            BUN, Bld   Date Value Ref Range Status   10/18/2017 21 (H) 6 - 20 mg/dL Final            Creatinine   Date Value Ref Range Status   10/18/2017 0.9 0.5 - 1.4 mg/dL Final            Calcium   Date Value Ref Range Status   10/18/2017 9.1 8.7 - 10.5 mg/dL Final            Total Protein   Date Value Ref Range Status   10/18/2017 6.5 6.0 - 8.4 g/dL Final              Albumin   Date Value Ref Range Status   10/18/2017 3.2 (L) 3.5 - 5.2 g/dL Final   10/11/2013 4.3 3.6 - 5.1  g/dL Final       Comment:       @ Test Performed By:  [facility] JAYCOB Almazan M.D..,   [address] [place] [address]  [facility] #[id number]              Total Bilirubin   Date Value Ref Range Status   10/18/2017 0.8 0.1 - 1.0 mg/dL Final       Comment:       For infants and newborns, interpretation of results should be based  on gestational age, weight and in agreement with clinical  observations.  Premature Infant recommended reference ranges:  Up to 24 hours.............<8.0 mg/dL  Up to 48 hours............<12.0 mg/dL  3-5 days..................<15.0 mg/dL  6-29 days.................<15.0 mg/dL            Alkaline Phosphatase   Date Value Ref Range Status   10/18/2017 87 55 - 135 U/L Final            AST   Date Value Ref Range Status   10/18/2017 23 10 - 40 U/L Final      ALT   Date Value Ref Range Status   10/18/2017 53 (H) 10 - 44 U/L Final            Anion Gap   Date Value Ref Range Status   10/18/2017 7 (L) 8 - 16 mmol/L Final            eGFR if    Date Value Ref Range Status   10/18/2017 >60.0 >60 mL/min/1.73 m^2 Final              eGFR if non    Date Value Ref Range Status   10/18/2017 >60.0 >60 mL/min/1.73 m^2 Final       Comment:       Calculation used to obtain the estimated glomerular filtration  rate (eGFR) is the CKD-EPI equation. Since race is unknown   in our information system, the eGFR values for   -American and Non--American patients are given   for each creatinine result.            Diagnosis   1. CNS Vasculitis  2. Cognitive dysfunction  3. Depression        Discussion   1. Continue monthly pulse Cytoxan; As per Dr. Love, we would like to continue on this protocol for about a year. Pt is improving, but still has long way to go to get back to cognitive baseline. We appreciate partnership with Dr. Goldstein who will manage labs and dosing as per protocol. Recent WBC is low. I will  communicate this to Dr. Goldstein. I think that depression might be confounding the patient's recovery (causing lack of motivation to do things; perhaps also contributing to him not talking much at home). He admits to feeling depressed and is open to counseling. We will add Abilify 5mg as adjunct treatment. I will also refer him for counseling with either Sharda Gonzalez LCSW or with the psychiatry department.   2.Physical therapy at [facility]  3. F/u 2 months with Dr. Love.   4. Continue prednisone 10mg for now  5. Consider repeat MRI brain in the spring of 2018--can order at next visit if needed       Over 50% of this 40 minute visit was spent in direct face to face counseling of the patient and his wife and son about his CNS vasculitis, current symptoms of depression, and plans for treatment. The patient and his family agree with the plan of care.

## 2017-12-28 NOTE — Clinical Note
Michael Goldstein, I saw Mr. Nunez today, and his white count seems to be trending lower again. I just wanted to bring this to your attention. If you would like for me to arrange a repeat, I'm happy to do that. Thanks.   COURTNEY Richardson

## 2017-12-29 DIAGNOSIS — F32.A DEPRESSION, UNSPECIFIED DEPRESSION TYPE: Primary | ICD-10-CM

## 2018-01-02 ENCOUNTER — TELEPHONE (OUTPATIENT)
Dept: NEUROLOGY | Facility: CLINIC | Age: 58
End: 2018-01-02

## 2018-01-04 ENCOUNTER — OFFICE VISIT (OUTPATIENT)
Dept: INTERNAL MEDICINE | Facility: CLINIC | Age: 58
End: 2018-01-04
Payer: MEDICAID

## 2018-01-04 ENCOUNTER — TELEPHONE (OUTPATIENT)
Dept: REHABILITATION | Facility: HOSPITAL | Age: 58
End: 2018-01-04

## 2018-01-04 VITALS
WEIGHT: 225.75 LBS | HEART RATE: 78 BPM | BODY MASS INDEX: 32.32 KG/M2 | SYSTOLIC BLOOD PRESSURE: 130 MMHG | HEIGHT: 70 IN | DIASTOLIC BLOOD PRESSURE: 76 MMHG | RESPIRATION RATE: 18 BRPM

## 2018-01-04 DIAGNOSIS — I10 ESSENTIAL HYPERTENSION: ICD-10-CM

## 2018-01-04 DIAGNOSIS — Z79.4 TYPE 2 DIABETES MELLITUS WITH OTHER NEUROLOGIC COMPLICATION, WITH LONG-TERM CURRENT USE OF INSULIN: ICD-10-CM

## 2018-01-04 DIAGNOSIS — E66.9 OBESITY (BMI 30-39.9): ICD-10-CM

## 2018-01-04 DIAGNOSIS — G47.33 OSA (OBSTRUCTIVE SLEEP APNEA): ICD-10-CM

## 2018-01-04 DIAGNOSIS — E78.2 MIXED HYPERLIPIDEMIA: ICD-10-CM

## 2018-01-04 DIAGNOSIS — R41.89 COGNITIVE DEFICITS: ICD-10-CM

## 2018-01-04 DIAGNOSIS — E78.5 HYPERLIPIDEMIA, UNSPECIFIED HYPERLIPIDEMIA TYPE: ICD-10-CM

## 2018-01-04 DIAGNOSIS — Z13.29 SCREENING FOR HYPOTHYROIDISM: ICD-10-CM

## 2018-01-04 DIAGNOSIS — I77.6 CNS VASCULITIS: ICD-10-CM

## 2018-01-04 DIAGNOSIS — T45.1X5A CHEMOTHERAPY-INDUCED NEUTROPENIA: ICD-10-CM

## 2018-01-04 DIAGNOSIS — S32.040A CLOSED COMPRESSION FRACTURE OF FOURTH LUMBAR VERTEBRA, INITIAL ENCOUNTER: ICD-10-CM

## 2018-01-04 DIAGNOSIS — I63.81 LACUNAR STROKE OF LEFT SUBTHALAMIC REGION: ICD-10-CM

## 2018-01-04 DIAGNOSIS — R53.1 LEFT-SIDED WEAKNESS: ICD-10-CM

## 2018-01-04 DIAGNOSIS — E11.42 TYPE 2 DIABETES MELLITUS WITH DIABETIC POLYNEUROPATHY, WITH LONG-TERM CURRENT USE OF INSULIN: ICD-10-CM

## 2018-01-04 DIAGNOSIS — Z79.4 TYPE 2 DIABETES MELLITUS WITH DIABETIC POLYNEUROPATHY, WITH LONG-TERM CURRENT USE OF INSULIN: ICD-10-CM

## 2018-01-04 DIAGNOSIS — D72.819 LEUKOPENIA, UNSPECIFIED TYPE: ICD-10-CM

## 2018-01-04 DIAGNOSIS — I63.9 CEREBROVASCULAR ACCIDENT (CVA), UNSPECIFIED MECHANISM: Primary | ICD-10-CM

## 2018-01-04 DIAGNOSIS — E11.49 TYPE 2 DIABETES MELLITUS WITH OTHER NEUROLOGIC COMPLICATION, WITH LONG-TERM CURRENT USE OF INSULIN: ICD-10-CM

## 2018-01-04 DIAGNOSIS — D70.1 CHEMOTHERAPY-INDUCED NEUTROPENIA: ICD-10-CM

## 2018-01-04 LAB
BASOPHILS NFR BLD: 1 %
DIFFERENTIAL METHOD: ABNORMAL
EOSINOPHIL NFR BLD: 1 %
ERYTHROCYTE [DISTWIDTH] IN BLOOD BY AUTOMATED COUNT: 14.4 %
HCT VFR BLD AUTO: 34.9 %
HGB BLD-MCNC: 12.3 G/DL
LYMPHOCYTES NFR BLD: 24 %
MCH RBC QN AUTO: 29.2 PG
MCHC RBC AUTO-ENTMCNC: 35.2 G/DL
MCV RBC AUTO: 83 FL
METAMYELOCYTES NFR BLD MANUAL: 1 %
MONOCYTES NFR BLD: 14 %
NEUTROPHILS NFR BLD: 55 %
NEUTS BAND NFR BLD MANUAL: 4 %
OVALOCYTES BLD QL SMEAR: ABNORMAL
PLATELET # BLD AUTO: 188 K/UL
PMV BLD AUTO: 9.3 FL
POIKILOCYTOSIS BLD QL SMEAR: SLIGHT
RBC # BLD AUTO: 4.21 M/UL
SCHISTOCYTES BLD QL SMEAR: PRESENT
WBC # BLD AUTO: 8.08 K/UL

## 2018-01-04 PROCEDURE — 99215 OFFICE O/P EST HI 40 MIN: CPT | Mod: PBBFAC | Performed by: NURSE PRACTITIONER

## 2018-01-04 PROCEDURE — 99215 OFFICE O/P EST HI 40 MIN: CPT | Mod: S$PBB,,, | Performed by: NURSE PRACTITIONER

## 2018-01-04 PROCEDURE — 85007 BL SMEAR W/DIFF WBC COUNT: CPT

## 2018-01-04 PROCEDURE — 99999 PR PBB SHADOW E&M-EST. PATIENT-LVL V: CPT | Mod: PBBFAC,,, | Performed by: NURSE PRACTITIONER

## 2018-01-04 PROCEDURE — 85027 COMPLETE CBC AUTOMATED: CPT

## 2018-01-04 NOTE — PLAN OF CARE
01/04/18 1153   Discharge Reassessment   Assessment Type Discharge Planning Reassessment     Sw contacted pt and left a message for callback about hh request.

## 2018-01-04 NOTE — PLAN OF CARE
01/04/18 1433   Discharge Reassessment   Assessment Type Discharge Planning Reassessment     Sw contacted the pt and left a message for call back.

## 2018-01-04 NOTE — PROGRESS NOTES
Subjective:       Patient ID: Bessy Nunez is a 57 y.o. male.    Chief Complaint: Establish Care    HPI: Pt presents to clinic today new to me and clinic with c/o needing care closer to home. They are from [place], they recently got on medicaid and needed a provider who takes their insurance. Son is moving back home to help take care of his father. He is here today with him helping to muddle through his history as patient is unsure oif a lot of answers.     They also see Dr Love for M/S at [facility] every ? Months, last note is from 10/20/2017 and was suppose to f/u with Beti Boswell in 2 months. (neurology- APRN)- which he did and now he has f/u with Ivan 2 months 2/26 scheduled  There is a phone message in for Ivan about increasing zoloft instead because insurance not covering abilift Beti added for depression.     Also sees Charly Goldstein for heme onc due to MS meds 12/14 last visit for MS protocol meds Also for monitoring cytopenias and MANISH due to chemotherapy induced scheduled next Friday for labs. Does blood prior to chemo infusions monthly. Will get the CBC that is ordered for today here. Last chemo 12/14    Has speech and occupational therapy appt tomorrow due to late affects from CVA. Started  PT and ST already started- last visit  12/26.     He also sees deepti for DM. Sees Promise Steele.     He also had a fall in 11/2014. Hit head. Admitted after for a closed compression fracture of 4th lumbar. Had brace, no longer wearing only when needed. No complaints of that pain today. No surgery needed.     He last saw endo 10/17/17. He was suppose to go back in 2 weeks but never made ir back. He is taking basaglar and novolog. He takes meal time set doses plus sliding scale. Patient and son unsure of dosing. Last A1C was 6.7!!    Has not seen cardiology since before MS diagnosis. Was cleared for work 5/2016.     Review of Systems   Constitutional: Negative for chills and fever.   HENT: Negative for congestion,  postnasal drip and sore throat.    Eyes: Negative for photophobia.   Respiratory: Negative for chest tightness and shortness of breath.    Cardiovascular: Negative for chest pain.   Gastrointestinal: Negative for abdominal distention, abdominal pain, blood in stool and vomiting.   Genitourinary: Negative for dysuria, flank pain and hematuria.   Musculoskeletal: Negative for back pain.   Skin: Negative for pallor.   Neurological: Positive for weakness (left sided). Negative for dizziness, syncope, facial asymmetry, speech difficulty and numbness.   Hematological: Does not bruise/bleed easily.   Psychiatric/Behavioral: Negative for agitation and suicidal ideas. The patient is not nervous/anxious.        Objective:      Physical Exam   Constitutional: He is oriented to person, place, and time. He appears well-developed and well-nourished.   HENT:   Head: Normocephalic and atraumatic.   Right Ear: External ear normal.   Left Ear: External ear normal.   Nose: Nose normal.   Mouth/Throat: Oropharynx is clear and moist.   Eyes: Conjunctivae and EOM are normal. Pupils are equal, round, and reactive to light.   Neck: Normal range of motion. Neck supple. No JVD present. No thyromegaly present.   Cardiovascular: Normal rate, regular rhythm and normal heart sounds.    No murmur heard.  Pulmonary/Chest: Effort normal and breath sounds normal. No respiratory distress. He has no wheezes. He has no rales.   Abdominal: Soft. Bowel sounds are normal. He exhibits no distension and no mass. There is no rebound and no guarding.   Musculoskeletal: He exhibits no edema, tenderness or deformity.   Lymphadenopathy:     He has no cervical adenopathy.   Neurological: He is alert and oriented to person, place, and time.   Slightly weaker left sided upper and lower ext   Skin: Skin is warm and dry. Capillary refill takes less than 2 seconds. No erythema.   Psychiatric: He has a normal mood and affect. His behavior is normal. Judgment and  thought content normal.   Nursing note and vitals reviewed.      Assessment:       1. Cerebrovascular accident (CVA), unspecified mechanism    2. Left-sided weakness    3. Screening for hypothyroidism    4. Hyperlipidemia, unspecified hyperlipidemia type    5. Type 2 diabetes mellitus with other neurologic complication, with long-term current use of insulin    6. Mixed hyperlipidemia    7. Essential hypertension    8. Obesity (BMI 30-39.9)    9.  JOHN with CPAP at night    10. Type 2 diabetes mellitus with diabetic polyneuropathy, with long-term current use of insulin    11. CNS vasculitis    12. Chemotherapy-induced neutropenia    13. Lacunar stroke of left subthalamic region    14. Closed compression fracture of fourth lumbar vertebra, initial encounter    15. Cognitive deficits        Plan:   Bessy was seen today for establish care.    Diagnoses and all orders for this visit:    Cerebrovascular accident (CVA), unspecified mechanism  -     Ambulatory referral to Home Health    Left-sided weakness  -     Ambulatory referral to Home Health    Screening for hypothyroidism  -     TSH; Future    Hyperlipidemia, unspecified hyperlipidemia type  -     LIPID PANEL; Future    Type 2 diabetes mellitus with other neurologic complication, with long-term current use of insulin  -     HEMOGLOBIN A1C; Future    Mixed hyperlipidemia    Essential hypertension    Obesity (BMI 30-39.9)     JOHN with CPAP at night    Type 2 diabetes mellitus with diabetic polyneuropathy, with long-term current use of insulin    CNS vasculitis    Chemotherapy-induced neutropenia    Lacunar stroke of left subthalamic region    Closed compression fracture of fourth lumbar vertebra, initial encounter    Cognitive deficits    today we need a cbc. All other labs due in 1/12 prior to chemo. Will be due for tsh, lipid and a1c in feb. Will add on to prior to chemo labs for convenience. Attempt to find a home health agency for him that can do therapies in home.  If not may be able to transfer therapy to LOS for convenience. No need for endo currently. If DM well controlled can take care of that. Leave neiro and heme onc at [facility]. Will get cards here as well 2/2017

## 2018-01-05 ENCOUNTER — CLINICAL SUPPORT (OUTPATIENT)
Dept: REHABILITATION | Facility: HOSPITAL | Age: 58
End: 2018-01-05
Payer: MEDICAID

## 2018-01-05 ENCOUNTER — DOCUMENTATION ONLY (OUTPATIENT)
Dept: NEUROLOGY | Facility: CLINIC | Age: 58
End: 2018-01-05

## 2018-01-05 DIAGNOSIS — R26.81 GAIT INSTABILITY: ICD-10-CM

## 2018-01-05 DIAGNOSIS — R49.0 DYSPHONIA: ICD-10-CM

## 2018-01-05 DIAGNOSIS — R47.01 APHASIA: ICD-10-CM

## 2018-01-05 DIAGNOSIS — R53.1 LEFT-SIDED WEAKNESS: ICD-10-CM

## 2018-01-05 DIAGNOSIS — R41.89 COGNITIVE DEFICITS: ICD-10-CM

## 2018-01-05 DIAGNOSIS — R26.89 BALANCE PROBLEMS: ICD-10-CM

## 2018-01-05 DIAGNOSIS — R29.6 FREQUENT FALLS: ICD-10-CM

## 2018-01-05 PROCEDURE — 97110 THERAPEUTIC EXERCISES: CPT | Mod: 59,PN

## 2018-01-05 PROCEDURE — 97127 *HC SP COG SKL DEV EA 15 MIN: CPT | Mod: PN

## 2018-01-05 NOTE — PROGRESS NOTES
"TIME RECORD    Date: 1/5/2018      Start Time:  1:15  Stop Time:  2:05    PROCEDURES:    TIMED  Procedure Min.   TE 45                     UNTIMED  Procedure Min.             Total Timed Minutes:  45  Total Timed Units:  3  Total Untimed Units:  0  Charges Billed/# of units:  3 TE      Progress/Current Status    Subjective:     Patient ID: Bessy Nunez is a 57 y.o. male.  Diagnosis:   1. Frequent falls     2. Balance problems     3. Gait instability     4. Left-sided weakness       Pain: pt reports he doesn't use his brace. " I don't feel like I need it, I've been ok". Pt agre    Objective:     Session initiated  Neuro re-ed and endurance training with B UE/LE extremities for reciprocal motion of all limbs on sci-fit x 10 min at level L2.5 at > or equal to 50 spm  w/o rest. with TE per log x 15' f/b standing balance training per log 25 minwith PTA 1:1. , included 1/2 stance in //bars on blue step while performing trunk rotations with 2 kg ball 15 each.     DATE 1/5/18 12/26/17 12/21/17   Visit 4 3 2   G CODE  POC exp 2/10/17 4/10 3/10 2/10   FOTO 4/5 3/5 2/5   Standing feet apart - -- --   Standing feet together - -- --   Standing 1/2 tandem - - --   Standing tandem - -- --   SLS R - -- --   SLS L - -- --   Side stepping  2 laps  20' in gym 4 laps //   Cross overs - -- --   Step overs - -- 1 lap length of orange tiles   Wii fit balance - -- --   Bosu - -- --   Beep board - -- --   Physioball sitting balance - -- --   SLR  NT no brace 2 x 15 B   clamshells  NT no brace Supine 2 x 10 B RTB   Hip abduction  -- --   bridges  NT no brace Hold compression fx   Adduction isometric  NT no brace 5'' x 15   LAQ 3# 2x10 B  2 x 15 2# 2 x 15 B   HS curls  2 x 15 PTB 2 x 15 PTB   HS stretch with strap  NT no brace on  MT   Gastroc stretch  3 x 30'' on fitter --   Leg press  -- --   Hip extension 2x10 B 2 x 10 B 2 x 10 B   Hip flexion 2x10 B 2 x 10 B 2 x 10 B   Hip Abduction 2x10 B 2 x 10 B 2 x 10 B   Hip adduction - -- -- "   Knee flexion - -- --   Toe raises - -- --   Heel raises 2x10 B  x 20 B --   Nu-step 8' L2 8' L2 8' L2   Step ups x20 // X 20 --   Lateral step ups x20 // X 20 --   INITIALS JA 1/6 FS FS       Assessment:     Pt tolerated session with no reports of pain, required seated rest breaks while performing standing activities due to general fatigue. Pt required verbal instructions on postural awareness during standing activities.     Patient Education/Response:     CONT HEP    Plans and Goals:   Cont PT as per POC, progress as tolerated.     Short Term Goals = Long Term Goals(8 Weeks)  1. Pt and wife will be indep with initial HEP.   2. Pt will have MMT score of 4/5 in all major ms groups in B LE.   3. Patient will be able to achieve greater than or equal to 15/24 on the DGI using least restrictive device decreasing patient's risk for falling and improving patient to 40-60% impaired, limited, or restricted category.     4. Pt will become a safe community ambulator with least restrictive device with low risk of falls and minimal gait deviations.  5. Pt will complete 6 minute walk test with 0  ft without AD.    6. Pt will report 34% on the FOTO Functional Assessment  indicating increased functional balance and mobility.  7. Pt will report 75% on abbrve ABC assessment indicating increase in balance confidence.

## 2018-01-05 NOTE — PATIENT INSTRUCTIONS
Pt provided with category matrix from [facility] 8 Word Finding by [employer] to complete at home with assistance of son/wife.

## 2018-01-05 NOTE — PROGRESS NOTES
OUTPATIENT REHABILITATION  SPEECH THERAPY PROGRESS NOTE    Date:  01/05/2018    Start Time:  1225  Stop Time:  1310    Subjective/History  Onset Date:  May 2017  Primary Diagnosis:   lacunar stroke, CNS vasculitis, MS  Treatment Diagnosis:  Aphasia, cognitive deficits, dysphonia.    Referring Provider:  Dr. Braydon Lowe  Orders: ST for evaluation and treat  Current Medical History:  Bessy Nunez presents to the [facility] secondary to the diagnosis of CNS vasculitis, MS, and lacunar stroke.  Pt and wife report diagnosis of CNS vasculitis and MS came in May 2017 followed by mini strokes.      TIMED  Procedure Min.   Cognitive tx  45 minutes                     UNTIMED  Procedure Min.              Total Minutes: 45  Total Timed Units: 3  Total Untimed Units: 0  Charges Billed/# of units: 3    Visit #: 4  Date of Evaluation: 12/5/17  Plan of Care Expiration:   2/5/18  Extended POC: Dec 5, 2017 to Feb 5, 2018    Progress/Current Status    Subjective:     Pain: 0 /10  Pt reported he has been doing well. Pt was complaint and pleasant.     Objective:     1. Pt will name 15 concrete category members and 10 abstract category members in 60 seconds with min A to improve expressive language.   Candy x 6 Independently; x 7 max A  states x 13 Independently; x 15 min A   Category matrix 75% acc (12/16)    2. Pt will answer orientation questions with 90% acc given min A to improve cognition.  -not formally addressed today.     3. Pt will answer problem solving questions with 80% acc given mod A to improve cognition.  -Given a picture stimuli, pt provided solutions to problems with 75% accuracy given mod verbal cues.     4. Pt will participate in mental manipulation tasks with 80% acc given mod A to improve cognition.  -Not addressed this session.     5. Pt will sequence 3 step picture cards with 80% acc given mod A to improve cognition.   -Not addressed this session.     6. Pt will  describe pictures with 80% acc given min A to improve expressive language.  --Pt described pictures with 80% accuracy given min A to assist with word finding.     7. Pt will follow simple written directions with 80% acc given min A to improve reading comprehension.   Not addressed this session    Assessment:     Mr. Nunez presents with mild receptive aphasia c/b difficulty following multi-step directions at times and difficulty following written directions. Mild-moderate expressive aphasia c/b disorganized speech, neologisms, Difficulty forming grammatically correct sentences in conversation.  Moderate dysphonia c/b low volume, breathiness, harsh vocal quality. Cognitive deficits c/b difficulty with orientation, immediate recall, reasoning, sequencing/organization, problem solving.  No significant dysphagia noted. Pt did well describing pictures and providing solutions to functional daily problems. Patient will benefit from outpatient neurological rehabilitation speech therapy.    Patient Education/Response:     Educated on goals and plan of care. Pt reported understanding. Home program: category matrix. Provided a list of activities that he can do with wife/son at home including reading the newspaper, playing card games, watching and participating in television shows like family feRhapsody, wheel of fortune, and [employer] for word finding.     Plans and Goals:     Continue POC to target cognitive goals.   Updated POC due 2/5/18.     TRICE Padron., CCC-SLP  Speech-Language Pathologist  01/05/2018

## 2018-01-08 ENCOUNTER — PATIENT MESSAGE (OUTPATIENT)
Dept: NEUROLOGY | Facility: CLINIC | Age: 58
End: 2018-01-08

## 2018-01-10 ENCOUNTER — CLINICAL SUPPORT (OUTPATIENT)
Dept: REHABILITATION | Facility: HOSPITAL | Age: 58
End: 2018-01-10
Payer: MEDICAID

## 2018-01-10 DIAGNOSIS — R49.0 DYSPHONIA: ICD-10-CM

## 2018-01-10 DIAGNOSIS — R26.81 GAIT INSTABILITY: ICD-10-CM

## 2018-01-10 DIAGNOSIS — R41.89 COGNITIVE DEFICITS: ICD-10-CM

## 2018-01-10 DIAGNOSIS — R26.89 BALANCE PROBLEMS: ICD-10-CM

## 2018-01-10 DIAGNOSIS — R29.6 FREQUENT FALLS: ICD-10-CM

## 2018-01-10 DIAGNOSIS — R47.01 APHASIA: ICD-10-CM

## 2018-01-10 DIAGNOSIS — R53.1 LEFT-SIDED WEAKNESS: ICD-10-CM

## 2018-01-10 PROCEDURE — 97110 THERAPEUTIC EXERCISES: CPT | Mod: 59,PN

## 2018-01-10 PROCEDURE — 92507 TX SP LANG VOICE COMM INDIV: CPT | Mod: PN

## 2018-01-10 PROCEDURE — 97112 NEUROMUSCULAR REEDUCATION: CPT | Mod: PN

## 2018-01-10 RX ORDER — HEPARIN 100 UNIT/ML
500 SYRINGE INTRAVENOUS
Status: CANCELLED | OUTPATIENT
Start: 2018-01-11

## 2018-01-10 RX ORDER — SODIUM CHLORIDE 0.9 % (FLUSH) 0.9 %
10 SYRINGE (ML) INJECTION
Status: CANCELLED | OUTPATIENT
Start: 2018-01-11

## 2018-01-10 NOTE — PROGRESS NOTES
"OUTPATIENT NEUROLOGICAL REHABILITATION  SPEECH THERAPY PROGRESS NOTE    Date:  1/10/2018     Start Time:  1400  Stop Time:  1445    Subjective/History  Onset Date:  May 2017  Primary Diagnosis:  Lacunar stroke, CNS vasculitis, MS  Treatment Diagnosis:  Aphasia, dysphonia, cognitive deficits.   1. Aphasia     2. Dysphonia     3. Cognitive deficits        Referring Provider:  Dr. Braydon Lowe  Reason for Referral: Speech therapy for evaluation and treatment  Current Medical History:  Bessy Nunez presents to the [facility] secondary to the diagnosis of CNS vasculitis, MS, and lacunar stroke.  Pt and wife report diagnosis of CNS vasculitis and MS came in May 2017 followed by mini strokes.      Precautions:  Fall risk    TIMED  Procedure Min.             UNTIMED  Procedure Min.   Speech/lang tx 45         Total Minutes: 45  Total Timed Units: 0  Total Untimed Units: 1  Charges Billed/# of units: 1    Visit #:  4  Date of Evaluation:  12/5/17  Plan of Care Expiration:   2/5/18  Certification period: Dec 5, 2017 to Feb 5, 2018    Progress/Current Status  Subjective:   Pain: 0 /10  No pain reported.  Pt stated "I feel good."    Objective:     Short Term Goals: (8 weeks) Current Progress:   1. Pt will name 15 concrete category members and 10 abstract category members in 60 seconds with min A to improve expressive language.    · States x 11 Independently in 60 seconds; x 17 mod A  · Animals x 7 Independently in 60 seconds; x 12 mod A    2. Pt will answer orientation questions with 90% acc given min A to improve cognition.   · 75% acc (9/12)   3. Pt will answer problem solving questions with 80% acc given mod A to improve cognition.   · Min-mod A   4. Pt will participate in mental manipulation tasks with 80% acc given mod A to improve cognition.   · Mod A   5. Pt will sequence 3-4 step picture cards with 80% acc given mod A to improve cognition.    · 3 step - 80% acc " Independently         GOALS MET:    7. Pt will follow simple written directions with 80% acc given min A to improve reading comprehension. Goal met       Assessment:   Current goals remain appropriate. Goals will be updated as needed.      Mr. Nunez presents with Mild-moderate expressive aphasia c/b disorganized speech, neologisms, Difficulty forming grammatically correct sentences in conversation.  Moderate dysphonia c/b low volume, breathiness, harsh vocal quality. Cognitive deficits c/b difficulty with orientation, immediate recall, reasoning, sequencing/organization, problem solving.  No significant dysphagia noted.  Pt would benefit from continued skilled ST. Prognosis for improvement remains Fair     Patient Education/Response:     Educated on goals and plan of care. Pt reported understanding. Educated pt and wife on important of completing home program to improve progress and outcomes.     Home program: following written directions worksheet     Plans and Goals:     Continue POC with focus on word finding and cognition.   Updated POC due 2/5/18.     Carmencita Harman M.S.CCC-SLP  Speech Language Pathologist     1/10/2018

## 2018-01-10 NOTE — PROGRESS NOTES
TIME RECORD    Date: 1/10/2018      Start Time:  1315  Stop Time:  1400    PROCEDURES:    TIMED  Procedure Min.   TE 45                     UNTIMED  Procedure Min.             Total Timed Minutes:  45  Total Timed Units:  3  Total Untimed Units:  0  Charges Billed/# of units:  3 TE      Progress/Current Status    Subjective:     Patient ID: Bessy Nunez is a 57 y.o. male.  Diagnosis:   1. Frequent falls     2. Balance problems     3. Gait instability     4. Left-sided weakness       Pain: 0 /10  Pt presented with back brace on with no new complaints.     Objective:     Session initiated with Neuro re-ed and endurance training with B UE/LE extremities for reciprocal motion of all limbs on sci-fit x 10 min at level 3 at > or equal to 50 spm w/o rest. Pt performed standing balance x 10' per log with PT 1:1.  Pt performed TE x 25' per log with PT 1:1.      DATE 1/10/18 1/5/18 12/26/17 12/21/17   Visit 5 4 3 2   G CODE  POC exp 2/10/17 5/10 4/10 3/10 2/10   FOTO 5/5 4/5 3/5 2/5   Standing feet apart -- - -- --   Standing feet together -- - -- --   Standing 1/2 tandem -- - - --   Standing tandem -- - -- --   SLS R -- - -- --   SLS L -- - -- --   Side stepping 2 laps  20' in gym over ladder  2 laps  20' in gym 4 laps //   Cross overs -- - -- --   Step overs -- - -- 1 lap length of orange tiles   Wii fit balance -- - -- --   Bosu -- - -- --   Beep board -- - -- --   Developmental seq -- - -- --   SLR --  NT no brace 2 x 15 B   clamshells --  NT no brace Supine 2 x 10 B RTB   Hip abduction ----  -- --   bridges --  NT no brace Hold compression fx   Adduction isometric --  NT no brace 5'' x 15   LAQ -- 3# 2x10 B  2 x 15 2# 2 x 15 B   HS curls --  2 x 15 PTB 2 x 15 PTB   HS stretch with strap --  NT no brace on  MT   Gastroc stretch 3 x 30'' on fitter  3 x 30'' on fitter --   Leg press --  -- --   Hip extension OOT 2x10 B 2 x 10 B 2 x 10 B   Hip flexion OOT 2x10 B 2 x 10 B 2 x 10 B   Hip Abduction 2x10 B 2x10 B 2 x 10 B 2 x  10 B   Hip adduction -- - -- --   Knee flexion -- - -- --   Toe raises -- - -- --   Heel raises 2x10 B 2x10 B  x 20 B --   Nu-step 10' L3 8' L2 8' L2 8' L2   Step ups x20 // x20 // X 20 --   Lateral step ups x20 // x20 // X 20 --   INITIALS FS JA 1/6 FS FS       Assessment:     Pt is tolerated session well but he is not fully engaged in session and needs max cues.  Pt would cont to benefit from PT to increase balance.     Patient Education/Response:     CONT HEP    Plans and Goals:     Cont PT as per POC, progress as tolerated.     Short Term Goals = Long Term Goals(8 Weeks)  1. Pt and wife will be indep with initial HEP.   2. Pt will have MMT score of 4/5 in all major ms groups in B LE.   3. Patient will be able to achieve greater than or equal to 15/24 on the DGI using least restrictive device decreasing patient's risk for falling and improving patient to 40-60% impaired, limited, or restricted category.     4. Pt will become a safe community ambulator with least restrictive device with low risk of falls and minimal gait deviations.  5. Pt will complete 6 minute walk test with 0  ft without AD.    6. Pt will report 34% on the FOTO Functional Assessment  indicating increased functional balance and mobility.  7. Pt will report 75% on abbrve ABC assessment indicating increase in balance confidence.

## 2018-01-12 ENCOUNTER — OFFICE VISIT (OUTPATIENT)
Dept: HEMATOLOGY/ONCOLOGY | Facility: CLINIC | Age: 58
End: 2018-01-12
Payer: MEDICAID

## 2018-01-12 ENCOUNTER — PATIENT MESSAGE (OUTPATIENT)
Dept: NEUROLOGY | Facility: CLINIC | Age: 58
End: 2018-01-12

## 2018-01-12 VITALS
OXYGEN SATURATION: 96 % | DIASTOLIC BLOOD PRESSURE: 80 MMHG | RESPIRATION RATE: 16 BRPM | BODY MASS INDEX: 33.17 KG/M2 | HEIGHT: 70 IN | WEIGHT: 231.69 LBS | HEART RATE: 91 BPM | TEMPERATURE: 99 F | SYSTOLIC BLOOD PRESSURE: 130 MMHG

## 2018-01-12 DIAGNOSIS — I77.6 VASCULITIS: ICD-10-CM

## 2018-01-12 DIAGNOSIS — Z51.11 ENCOUNTER FOR CHEMOTHERAPY MANAGEMENT: Primary | ICD-10-CM

## 2018-01-12 PROCEDURE — 99999 PR PBB SHADOW E&M-EST. PATIENT-LVL V: CPT | Mod: PBBFAC,,, | Performed by: INTERNAL MEDICINE

## 2018-01-12 PROCEDURE — 99215 OFFICE O/P EST HI 40 MIN: CPT | Mod: PBBFAC,25 | Performed by: INTERNAL MEDICINE

## 2018-01-12 PROCEDURE — 99214 OFFICE O/P EST MOD 30 MIN: CPT | Mod: S$PBB,,, | Performed by: INTERNAL MEDICINE

## 2018-01-12 NOTE — Clinical Note
Cbc, cmp, type and screen lab only in 2 weeks -same labs, Nurse practioner  babatunde, chair for cytoxan #7  infusion in 4 weeks

## 2018-01-13 NOTE — PROGRESS NOTES
SECTION OF HEMATOLOGY AND BONE MARROW TRANSPLANT  New Patient Visit   01/13/2018  Referred by:  No ref. provider found  Referred for:     CHIEF COMPLAINT:   No chief complaint on file.      HISTORY OF PRESENT ILLNESS:   Presents for cycle #6  HD CTX for MS.   Per wife confusion slightly improved from last appt.  Labs reviewed. Patient examined.  No contraindication to proceed with CTX.  Denies fever, chills, nightsweats, bleeding, brusing, lymphadenopathy, signs/symptoms of splenomegaly.       Patient appears pleasant but  confused during appt.   PAST MEDICAL HISTORY:   Past Medical History:   Diagnosis Date    CNS vasculitis 6/2/2017    Follows with Dr. Love By mri.  Several active lesions, many old. 5/13: MRA brain/neck, MRI brain w/wo contrast show no vascular occlusion, multiple foci of hyperintensity in deep cerebral periventricular white matter in pattern of demyelinating process.  5/17: MRI spine performed, no spinal cord lesions. Hospitalization 6/2017. EEG was performed negative for seizures.  Repeat MRI showing new lesion, question whether this was demyelination versus CVA. 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 Findings compatible with a small acute lacunar infarct adjacent to the left frontal horn.  Nonspecific enhancement just inferior to this region and extending into the left basal ganglia, as well as within the inferior aspect of the left cerebellum.  No evidence of a focal mass. 2.  Extensive increased signal intensity involving the periventricular white matter compatible with demyelinating disease versus vasculitis. 3.  Clinical correlation and followup recommended. 4.  This reportedly flattened in the EPIC and the corpus callosum medical record system.    Mixed hyperlipidemia  "11/9/2012    NAFLD (nonalcoholic fatty liver disease) 10/11/2013    CHASE (nonalcoholic steatohepatitis)     Obesity     Stroke     Type 2 diabetes mellitus with diabetic polyneuropathy, with long-term current use of insulin 5/14/2017    Type 2 diabetes mellitus with hyperglycemia, with long-term current use of insulin 10/11/2013       PAST SURGICAL HISTORY:   Past Surgical History:   Procedure Laterality Date    SKIN CANCER EXCISION         PAST SOCIAL HISTORY:   reports that he has never smoked. He has never used smokeless tobacco. He reports that he does not drink alcohol or use drugs.    FAMILY HISTORY:  Family History   Problem Relation Age of Onset    Heart disease Mother     Hypertension Mother     Heart failure Mother     Cancer Father      lung    Diabetes Maternal Aunt        CURRENT MEDICATIONS:   Current Outpatient Prescriptions   Medication Sig    ARIPiprazole (ABILIFY) 5 MG Tab Take 1 tablet (5 mg total) by mouth once daily.    aspirin (ECOTRIN) 81 MG EC tablet Take 81 mg by mouth once daily.    atenolol (TENORMIN) 50 MG tablet Take 1 tablet (50 mg total) by mouth once daily.    atorvastatin (LIPITOR) 40 MG tablet Take 1 tablet (40 mg total) by mouth once daily.    blood sugar diagnostic Strp To check BG 4 times daily, to use with insurance preferred meter    blood-glucose meter kit To check BG 4 times daily, one touch verio meter. Dx code e11.65    diltiaZEM (DILACOR XR) 240 MG CDCR Take 1 capsule (240 mg total) by mouth once daily.    insulin glargine (BASAGLAR KWIKPEN) 100 unit/mL (3 mL) InPn pen Inject 32 Units into the skin 2 (two) times daily. Once in the morning at 9 am and once in the evening at 9 pm.    insulin lispro (HUMALOG) 100 unit/mL injection Inject 20 units before meals    insulin needles, disposable, (BD ULTRA-FINE ANA PEN NEEDLES) 32 x 5/32 " Ndle Inject twice daily    insulin syringe-needle U-100 (INSULIN SYRINGE) 1/2 mL 30 gauge x 5/16 Syrg Use 3x/day    " "insulin syringe-needle U-100 0.5 mL 31 gauge x 5/16 Syrg     lancets Misc To check BG 4 times daily, one touch verio meter. Dx code e11.65    levetiracetam (KEPPRA) 500 MG Tab Take 1 tablet (500 mg total) by mouth 2 (two) times daily.    liraglutide 0.6 mg/0.1 mL, 18 mg/3 mL, subq PNIJ (VICTOZA 3-TOMASZ) 0.6 mg/0.1 mL (18 mg/3 mL) PnIj Inject 1.8 mg into the skin once daily.    metformin (GLUCOPHAGE-XR) 500 MG 24 hr tablet Take 2 tablets (1,000 mg total) by mouth 2 (two) times daily with meals.    NOVOFINE PLUS 32 gauge x 1/6" Ndle     omega-3 fatty acids-vitamin E (FISH OIL) 1,000 mg Cap Take 1 capsule by mouth 2 (two) times daily.    ONETOUCH DELICA LANCETS 33 gauge Misc     ranitidine (ZANTAC) 150 MG tablet Take 1 tablet (150 mg total) by mouth 2 (two) times daily.    sertraline (ZOLOFT) 100 MG tablet 1 and half tablet daily (Patient taking differently: Take 150 mg by mouth once daily. )    sulfamethoxazole-trimethoprim 800-160mg (BACTRIM DS) 800-160 mg Tab Take every Monday, Wednesday, and Friday.  Infectious Disease will decide if this is needed in 3-4 months.    valsartan (DIOVAN) 320 MG tablet Take 1 tablet (320 mg total) by mouth once daily.    vitamin D 1000 units Tab Take 5 tablets (5,000 Units total) by mouth once daily.    calcium carbonate (OS-DONNA) 500 mg calcium (1,250 mg) tablet Take 2 tablets (1,000 mg total) by mouth once.     No current facility-administered medications for this visit.      ALLERGIES:   Review of patient's allergies indicates:  No Known Allergies          REVIEW OF SYSTEMS:   General ROS: negative  Psychological ROS: negative  Ophthalmic ROS: negative  ENT ROS: negative  Allergy and Immunology ROS: negative  Hematological and Lymphatic ROS: negative  Endocrine ROS: negative  Respiratory ROS: negative  Cardiovascular ROS: negative  Gastrointestinal ROS: negative  Genito-Urinary ROS: negative  Musculoskeletal ROS: negative  Neurological ROS: see HPI  Dermatological ROS: " negative    PHYSICAL EXAM:   Vitals:    01/12/18 1329   BP: 130/80   Pulse: 91   Resp: 16   Temp: 98.8 °F (37.1 °C)       General - well developed, well nourished, no apparent distress  Head & Face - no sinus tenderness  Eyes - normal conjunctivae and lids   ENT - normal external auditory canals and tympanic membranes bilaterally oropharynx clear,  Normal dentition and gums  Neck - normal thyroid  Chest and Lung - normal respiratory effort, clear to auscultation bilaterally   Cardiovascular - RRR with no MGR, normal S1 and S2; no pedal edema  Abdomen -  soft, nontender, no palpable hepatomegaly or splenomegaly  Lymph - no palpable lymphadenopathy  Extremities - unremarkable nails and digits  Heme - no bruising, petechiae, pallor  Skin - no rashes or lesions  Psych - appropriate mood and affect      ECOG Performance Status: (foot note - ECOG PS provided by Eastern Cooperative Oncology Group) 1 - Symptomatic but completely ambulatory    Karnofsky Performance Score:  90%- Able to Carry on Normal Activity: Minor Symptoms of Disease  DATA:   Lab Results   Component Value Date    WBC 11.73 01/12/2018    HGB 11.7 (L) 01/12/2018    HCT 33.4 (L) 01/12/2018    MCV 82 01/12/2018     01/12/2018     Gran #   Date Value Ref Range Status   01/12/2018 9.3 (H) 1.8 - 7.7 K/uL Final     Gran%   Date Value Ref Range Status   01/12/2018 79.1 (H) 38.0 - 73.0 % Final     CMP  Sodium   Date Value Ref Range Status   01/12/2018 143 136 - 145 mmol/L Final     Potassium   Date Value Ref Range Status   01/12/2018 3.7 3.5 - 5.1 mmol/L Final     Chloride   Date Value Ref Range Status   01/12/2018 103 95 - 110 mmol/L Final     CO2   Date Value Ref Range Status   01/12/2018 29 23 - 29 mmol/L Final     Glucose   Date Value Ref Range Status   01/12/2018 129 (H) 70 - 110 mg/dL Final     BUN, Bld   Date Value Ref Range Status   01/12/2018 15 6 - 20 mg/dL Final     Creatinine   Date Value Ref Range Status   01/12/2018 1.0 0.5 - 1.4 mg/dL Final      Calcium   Date Value Ref Range Status   01/12/2018 10.1 8.7 - 10.5 mg/dL Final     Total Protein   Date Value Ref Range Status   01/12/2018 7.3 6.0 - 8.4 g/dL Final     Albumin   Date Value Ref Range Status   01/12/2018 3.7 3.5 - 5.2 g/dL Final   10/11/2013 4.3 3.6 - 5.1 g/dL Final     Comment:     @ Test Performed By:  [facility]  AMELIA Vázquez M.D.,   [address] [place] [id number]  [facility] #[id number]     Total Bilirubin   Date Value Ref Range Status   01/12/2018 0.6 0.1 - 1.0 mg/dL Final     Comment:     For infants and newborns, interpretation of results should be based  on gestational age, weight and in agreement with clinical  observations.  Premature Infant recommended reference ranges:  Up to 24 hours.............<8.0 mg/dL  Up to 48 hours............<12.0 mg/dL  3-5 days..................<15.0 mg/dL  6-29 days.................<15.0 mg/dL       Alkaline Phosphatase   Date Value Ref Range Status   01/12/2018 74 55 - 135 U/L Final     AST   Date Value Ref Range Status   01/12/2018 23 10 - 40 U/L Final     ALT   Date Value Ref Range Status   01/12/2018 27 10 - 44 U/L Final     Anion Gap   Date Value Ref Range Status   01/12/2018 11 8 - 16 mmol/L Final     eGFR if    Date Value Ref Range Status   01/12/2018 >60.0 >60 mL/min/1.73 m^2 Final     eGFR if non    Date Value Ref Range Status   01/12/2018 >60.0 >60 mL/min/1.73 m^2 Final     Comment:     Calculation used to obtain the estimated glomerular filtration  rate (eGFR) is the CKD-EPI equation.            ASSESSMENT AND PLAN:   Encounter Diagnoses   Name Primary?    Encounter for chemotherapy management Yes    Vasculitis      -proceed with HDCTX 600mg/m2 dose #6 with mesna support per CNS vasculitis  protocol per Dr. Love  -per neurology, plan for approximate 1 year of monthly therapy  -he has some likely chemo induced mild cytopenias and hood; recommend  monitoring with serial labs to ensure does not need transfusions for chemotherapy induced cytopenias    Follow Up:  Cbc, cmp, type and screen lab only in 2 weeks  -same labs, Nurse practioner  armandot, chair for cytoxan #7  infusion in 4 weeks   Doron Goldstein MD  Hematology/Oncology/Bone Marrow Transplant

## 2018-01-14 DIAGNOSIS — I77.6 CNS VASCULITIS: ICD-10-CM

## 2018-01-14 RX ORDER — LEVETIRACETAM 500 MG/1
TABLET ORAL
Qty: 60 TABLET | Refills: 2 | Status: SHIPPED | OUTPATIENT
Start: 2018-01-14 | End: 2018-06-14

## 2018-01-16 NOTE — TELEPHONE ENCOUNTER
Yes, I have reached out to him and his wife twice on [facility]. The messages have been read, but I have not gotten a response yet.

## 2018-01-18 ENCOUNTER — TELEPHONE (OUTPATIENT)
Dept: HEMATOLOGY/ONCOLOGY | Facility: CLINIC | Age: 58
End: 2018-01-18

## 2018-01-18 NOTE — TELEPHONE ENCOUNTER
----- Message from Mya Trinidad sent at 1/18/2018  8:13 AM CST -----  Contact: Pt wife  Pt wife calling to cancel and reschedule chemo appt        Tabatha call back number [phone]

## 2018-01-22 ENCOUNTER — PATIENT MESSAGE (OUTPATIENT)
Dept: NEUROLOGY | Facility: CLINIC | Age: 58
End: 2018-01-22

## 2018-01-22 ENCOUNTER — TELEPHONE (OUTPATIENT)
Dept: NEUROLOGY | Facility: CLINIC | Age: 58
End: 2018-01-22

## 2018-01-22 ENCOUNTER — CLINICAL SUPPORT (OUTPATIENT)
Dept: REHABILITATION | Facility: HOSPITAL | Age: 58
End: 2018-01-22
Payer: MEDICAID

## 2018-01-22 DIAGNOSIS — R53.1 LEFT-SIDED WEAKNESS: ICD-10-CM

## 2018-01-22 DIAGNOSIS — R41.89 COGNITIVE IMPAIRMENT: Primary | ICD-10-CM

## 2018-01-22 DIAGNOSIS — I77.6 CNS VASCULITIS: ICD-10-CM

## 2018-01-22 DIAGNOSIS — R41.89 COGNITIVE DEFICITS: ICD-10-CM

## 2018-01-22 DIAGNOSIS — R26.81 GAIT INSTABILITY: ICD-10-CM

## 2018-01-22 DIAGNOSIS — R26.89 BALANCE PROBLEMS: ICD-10-CM

## 2018-01-22 DIAGNOSIS — R47.01 APHASIA: ICD-10-CM

## 2018-01-22 DIAGNOSIS — R29.6 FREQUENT FALLS: ICD-10-CM

## 2018-01-22 DIAGNOSIS — R49.0 DYSPHONIA: ICD-10-CM

## 2018-01-22 PROCEDURE — 97110 THERAPEUTIC EXERCISES: CPT | Mod: PN

## 2018-01-22 PROCEDURE — 92507 TX SP LANG VOICE COMM INDIV: CPT | Mod: PN

## 2018-01-22 NOTE — PROGRESS NOTES
TIME RECORD    Date: 1/22/2018      Start Time:  2:45  Stop Time:  3:30    PROCEDURES:    TIMED  Procedure Min.   NMR 10   TE 35                 UNTIMED  Procedure Min.             Total Timed Minutes:  45  Total Timed Units:  3  Total Untimed Units:  0  Charges Billed/# of units:  3 (3 TE)      Progress/Current Status    Subjective:     Patient ID: Bessy Nunez is a 57 y.o. male.  Diagnosis:   1. Frequent falls     2. Balance problems     3. Gait instability     4. Left-sided weakness       Pain: 0 /10  Pt presented to session with no new complaints. His shoes were not on totally today and he needed assist to fix them.     Objective:     Session initiated with Neuro re-ed and endurance training with B UE/LE extremities for reciprocal motion of all limbs on sci-fit x 10 min at level 3.0 at > or equal to 50 spm w/o rest. Pt performed standing balance and TE per log x 35' with PT 1:1.        DATE 1/22/18 1/10/18 1/5/18 12/26/17 12/21/17   Visit 6 5 4 3 2   G CODE  POC exp 2/10/17 6/10 5/10 4/10 3/10 2/10   FOTO 6/10 5/5 4/5 3/5 2/5   Standing feet apart -- -- - -- --   Standing feet together -- -- - -- --   Standing 1/2 tandem -- -- - - --   Standing tandem -- -- - -- --   SLS R -- -- - -- --   SLS L -- -- - -- --   Side stepping 2 laps  20' in gym over ladder 2 laps  20' in gym over ladder  2 laps  20' in gym 4 laps //   Cross overs -- -- - -- --   Step overs -- -- - -- 1 lap length of orange tiles   Wii fit balance -- -- - -- --   Bosu -- -- - -- --   Beep board -- -- - -- --   pDevelopmental seq -- -- - -- --   SLR -- --  NT no brace 2 x 15 B   clamshells -- --  NT no brace Supine 2 x 10 B RTB   Hip abduction -- ----  -- --   bridges -- --  NT no brace Hold compression fx   Adduction isometric -- --  NT no brace 5'' x 15   LAQ -- -- 3# 2x10 B  2 x 15 2# 2 x 15 B   HS curls -- --  2 x 15 PTB 2 x 15 PTB   HS stretch with strap 3 x 30''  --  NT no brace on  MT   Gastroc stretch 3 x 30'' on fitter 3 x 30'' on fitter   3 x 30'' on fitter --   Leg press -- --  -- --   Hip extension OOT OOT 2x10 B 2 x 10 B 2 x 10 B   Hip flexion 2 x 10 B OOT 2x10 B 2 x 10 B 2 x 10 B   Hip Abduction 2 x 10 B 2x10 B 2x10 B 2 x 10 B 2 x 10 B   Hip adduction -- -- - -- --   Knee flexion -- -- - -- --   Toe raises -- -- - -- --   Heel raises 2 x 10 B 2x10 B 2x10 B  x 20 B --   Nu-step 10' L3 10' L3 8' L2 8' L2 8' L2   Step ups X 20 //  x20 // x20 // X 20 --   Lateral step ups X 20// x20 // x20 // X 20 --   INITIALS FS FS JA 1/6 FS FS       Assessment:     Pt is progressing toward standing TE and balance and will cont to benefit from PT to increase balance, safety and function.     Patient Education/Response:     CONT HEP    Plans and Goals:     Cont PT as per POC, progress as tolerated.     Short Term Goals = Long Term Goals(8 Weeks)  1. Pt and wife will be indep with initial HEP.   2. Pt will have MMT score of 4/5 in all major ms groups in B LE.   3. Patient will be able to achieve greater than or equal to 15/24 on the DGI using least restrictive device decreasing patient's risk for falling and improving patient to 40-60% impaired, limited, or restricted category.     4. Pt will become a safe community ambulator with least restrictive device with low risk of falls and minimal gait deviations.  5. Pt will complete 6 minute walk test with 0  ft without AD.    6. Pt will report 34% on the FOTO Functional Assessment  indicating increased functional balance and mobility.  7. Pt will report 75% on abbrve ABC assessment indicating increase in balance confidence.

## 2018-01-22 NOTE — PROGRESS NOTES
"OUTPATIENT NEUROLOGICAL REHABILITATION  SPEECH THERAPY PROGRESS NOTE    Date:  1/22/2018     Start Time:  1530  Stop Time:  1615    Subjective/History  Onset Date:  May 2017  Primary Diagnosis:  Lacunar stroke, CNS vasculitis, MS  Treatment Diagnosis:  Aphasia, dysphonia, cognitive deficits.   1. Aphasia     2. Dysphonia     3. Cognitive deficits        Referring Provider:  Dr. Braydon Lowe  Reason for Referral: Speech therapy for evaluation and treatment  Current Medical History:  Bessy Nunez presents to the [facility] secondary to the diagnosis of CNS vasculitis, MS, and lacunar stroke.  Pt and wife report diagnosis of CNS vasculitis and MS came in May 2017 followed by mini strokes.      Precautions:  Fall risk    TIMED  Procedure Min.             UNTIMED  Procedure Min.   Speech/lang tx 45         Total Minutes: 45  Total Timed Units: 0  Total Untimed Units: 1  Charges Billed/# of units: 1    Visit #:  5  Date of Evaluation:  12/5/17  Plan of Care Expiration:   2/5/18  Certification period: Dec 5, 2017 to Feb 5, 2018    Progress/Current Status  Subjective:   Pain: 0 /10  No pain reported.  Pt stated "I feel good."    Objective:     Short Term Goals: (8 weeks) Current Progress:   1. Pt will name 15 concrete category members and 10 abstract category members in 60 seconds with min A to improve expressive language.    · Body parts x 9 Independently in 60 seconds; x 13 min A in 60 seconds; x 20 mod A untimed  · Cities x 14 Independently in 60 seconds; x 24 with min A untimed  · Food x 8 Independently in 60 seconds; x 13 min A in 60 seconds; x 19 mod A untimed     2. Pt will answer orientation questions with 90% acc given min A to improve cognition.   · Orientation questions x 16 with 69% acc Independently; 75% acc min A   3. Pt will answer problem solving questions with 80% acc given mod A to improve cognition.   · X 13 with 100% acc given min A   4. Pt will " participate in mental manipulation tasks with 80% acc given mod A to improve cognition.   · 3 word reverse with 40% acc   5. Pt will sequence 3-4 step picture cards with 80% acc given mod A to improve cognition.    · 3 step (80% acc previously)  · 4 step - 40% acc Independently    8. Pt will describe pictures with 85% acc to improve expressive language.      · Mod A        GOALS MET:    7. Pt will follow simple written directions with 80% acc given min A to improve reading comprehension. Goal met       Assessment:   One new goal added.  Improvement noted in word finding today.  Current goals remain appropriate. Goals will be updated as needed.      Mr. Nunez presents with Mild-moderate expressive aphasia c/b disorganized speech, neologisms, Difficulty forming grammatically correct sentences in conversation.  Moderate dysphonia c/b low volume, breathiness, harsh vocal quality. Cognitive deficits c/b difficulty with orientation, immediate recall, reasoning, sequencing/organization, problem solving.  No significant dysphagia noted.  Pt would benefit from continued skilled ST. Prognosis for improvement remains Fair     Patient Education/Response:     Educated on goals and plan of care. Pt reported understanding. Educated pt and wife on important of completing home program to improve progress and outcomes.     Home program: category matrix.     Plans and Goals:     Continue POC with focus on word finding and cognition.   Updated POC due 2/5/18.     Carmencita Harman M.S.CCC-SLP  Speech Language Pathologist     1/22/2018

## 2018-01-23 ENCOUNTER — INFUSION (OUTPATIENT)
Dept: INFUSION THERAPY | Facility: HOSPITAL | Age: 58
End: 2018-01-23
Attending: INTERNAL MEDICINE
Payer: MEDICAID

## 2018-01-23 VITALS
HEART RATE: 92 BPM | SYSTOLIC BLOOD PRESSURE: 117 MMHG | RESPIRATION RATE: 18 BRPM | DIASTOLIC BLOOD PRESSURE: 65 MMHG | TEMPERATURE: 98 F

## 2018-01-23 DIAGNOSIS — I77.6 CNS VASCULITIS: Primary | ICD-10-CM

## 2018-01-23 PROCEDURE — 96367 TX/PROPH/DG ADDL SEQ IV INF: CPT

## 2018-01-23 PROCEDURE — 96411 CHEMO IV PUSH ADDL DRUG: CPT

## 2018-01-23 PROCEDURE — 63600175 PHARM REV CODE 636 W HCPCS: Mod: JG | Performed by: INTERNAL MEDICINE

## 2018-01-23 PROCEDURE — 96413 CHEMO IV INFUSION 1 HR: CPT

## 2018-01-23 PROCEDURE — 25000003 PHARM REV CODE 250: Performed by: INTERNAL MEDICINE

## 2018-01-23 RX ORDER — HEPARIN 100 UNIT/ML
500 SYRINGE INTRAVENOUS
Status: DISCONTINUED | OUTPATIENT
Start: 2018-01-23 | End: 2018-01-23 | Stop reason: HOSPADM

## 2018-01-23 RX ORDER — SODIUM CHLORIDE 0.9 % (FLUSH) 0.9 %
10 SYRINGE (ML) INJECTION
Status: DISCONTINUED | OUTPATIENT
Start: 2018-01-23 | End: 2018-01-23 | Stop reason: HOSPADM

## 2018-01-23 RX ADMIN — CYCLOPHOSPHAMIDE 1350 MG: 2 INJECTION, POWDER, FOR SOLUTION INTRAVENOUS; ORAL at 03:01

## 2018-01-23 RX ADMIN — DEXAMETHASONE SODIUM PHOSPHATE: 4 INJECTION, SOLUTION INTRAMUSCULAR; INTRAVENOUS at 02:01

## 2018-01-23 RX ADMIN — MESNA 1350 MG: 100 INJECTION, SOLUTION INTRAVENOUS at 02:01

## 2018-01-23 NOTE — PLAN OF CARE
Problem: Patient Care Overview  Goal: Plan of Care Review  Outcome: Ongoing (interventions implemented as appropriate)  Pt tolerated mesna/cytoxan infusion without issue, avs given, pt has no upcoming appt.s at this time, pt aware, no distress noted upon d/c to home

## 2018-01-23 NOTE — PLAN OF CARE
Problem: Chemotherapy Effects (Adult)  Goal: Signs and Symptoms of Listed Potential Problems Will be Absent, Minimized or Managed (Chemotherapy Effects)  Signs and symptoms of listed potential problems will be absent, minimized or managed by discharge/transition of care (reference Chemotherapy Effects (Adult) CPG).  Outcome: Ongoing (interventions implemented as appropriate)  Pt here for Mesna,Cytoxan infusion, labs, hx, meds, allergies reviewed. Pt without complaints or concerns at this time just always tired, pt with flat affect, wife states from all meds. Reclined in chair, continue to monitor

## 2018-01-24 ENCOUNTER — TELEPHONE (OUTPATIENT)
Dept: NEUROLOGY | Facility: CLINIC | Age: 58
End: 2018-01-24

## 2018-01-24 ENCOUNTER — CLINICAL SUPPORT (OUTPATIENT)
Dept: REHABILITATION | Facility: HOSPITAL | Age: 58
End: 2018-01-24
Payer: MEDICAID

## 2018-01-24 DIAGNOSIS — R53.1 LEFT-SIDED WEAKNESS: ICD-10-CM

## 2018-01-24 DIAGNOSIS — R26.81 GAIT INSTABILITY: ICD-10-CM

## 2018-01-24 DIAGNOSIS — R29.6 FREQUENT FALLS: ICD-10-CM

## 2018-01-24 DIAGNOSIS — R26.89 BALANCE PROBLEMS: ICD-10-CM

## 2018-01-24 PROCEDURE — 97110 THERAPEUTIC EXERCISES: CPT | Mod: PN

## 2018-01-24 NOTE — PROGRESS NOTES
TIME RECORD    Date: 1/24/2018      Start Time:  1445  Stop Time:  1530    PROCEDURES:    TIMED  Procedure Min.   NMR 15   Supervised TE 5   TE 25             UNTIMED  Procedure Min.             Total Timed Minutes:  40  Total Timed Units:  3  Total Untimed Units:  0  Charges Billed/# of units:  3 TE      Progress/Current Status    Subjective:     Patient ID: Bessy Nunez is a 57 y.o. male.  Diagnosis:   1. Frequent falls     2. Balance problems     3. Gait instability     4. Left-sided weakness       Pain: 0 /10  Pt presented to session with no new complaints today.    Objective:     Session initiated with supervised TE on bike x 5' L3.     DATE 1/24/17 1/22/18 1/10/18 1/5/18 12/26/17 12/21/17   Visit 7 6 5 4 3 2   G CODE  POC exp 2/10/17 7/10 6/10 5/10 4/10 3/10 2/10   FOTO 7/0 6/10 5/5 4/5 3/5 2/5   Standing feet apart -- -- -- - -- --   Standing feet together -- -- -- - -- --   Standing 1/2 tandem -- -- -- - - --   Standing tandem -- -- -- - -- --   SLS R -- -- -- - -- --   SLS L -- -- -- - -- --   Side stepping 2 laps  20' / over hurdles 2 laps  20' in gym over ladder 2 laps  20' in gym over ladder  2 laps  20' in gym 4 laps //   Cross overs -- -- -- - -- --   Step overs 3 laps hurdles / 1 UE support -- -- - -- 1 lap length of orange tiles   Wii fit balance -- -- -- - -- --   Bosu -- -- -- - -- --   Beep board -- -- -- - -- --   Developmental seq -- -- -- - -- --   SLR -- -- --  NT no brace 2 x 15 B   clamshells -- -- --  NT no brace Supine 2 x 10 B RTB   Hip abduction -- -- ----  -- --   bridges -- -- --  NT no brace Hold compression fx   Adduction isometric -- -- --  NT no brace 5'' x 15   LAQ - -- -- 3# 2x10 B  2 x 15 2# 2 x 15 B   HS curls -- -- --  2 x 15 PTB 2 x 15 PTB   HS stretch with strap 3 x 30'' 3 x 30''  --  NT no brace on  MT   Gastroc stretch 3 x 30'' on fitter 3 x 30'' on fitter 3 x 30'' on fitter  3 x 30'' on fitter --   Leg press -- -- --  -- --   Hip extension 2 x 15 B OOT OOT 2x10 B 2 x 10  B 2 x 10 B   Hip flexion 2 x 10 B 2 x 10 B OOT 2x10 B 2 x 10 B 2 x 10 B   Hip Abduction 2 x 10 B 2 x 10 B 2x10 B 2x10 B 2 x 10 B 2 x 10 B   Hip adduction -- -- -- - -- --   Knee flexion -- -- -- - -- --   Toe raises -- -- -- - -- --   Heel raises 2 x 10 B 2 x 10 B 2x10 B 2x10 B  x 20 B --   Nu-step NT 10' L3 10' L3 8' L2 8' L2 8' L2   Step ups X 20 /  X 20 //  x20 // x20 // X 20 --   Lateral step ups X 20 /  X 20// x20 // x20 // X 20 --   Bike 5' L3        INITIALS FS FS FS JA 1/6 FS FS       Assessment:     Pt is able to perform balance and standing task with 1 UE support or none demonstrating increased balance.     Patient Education/Response:     CONT HEP    Plans and Goals:     Cont PT as per POC, progress as tolerated.     Short Term Goals = Long Term Goals(8 Weeks)  1. Pt and wife will be indep with initial HEP.   2. Pt will have MMT score of 4/5 in all major ms groups in B LE.   3. Patient will be able to achieve greater than or equal to 15/24 on the DGI using least restrictive device decreasing patient's risk for falling and improving patient to 40-60% impaired, limited, or restricted category.     4. Pt will become a safe community ambulator with least restrictive device with low risk of falls and minimal gait deviations.  5. Pt will complete 6 minute walk test with 0  ft without AD.    6. Pt will report 34% on the FOTO Functional Assessment  indicating increased functional balance and mobility.  7. Pt will report 75% on abbrve ABC assessment indicating increase in balance confidence.

## 2018-01-25 ENCOUNTER — HOSPITAL ENCOUNTER (EMERGENCY)
Facility: HOSPITAL | Age: 58
Discharge: HOME | End: 2018-01-25
Attending: SURGERY
Payer: MEDICAID

## 2018-01-25 VITALS
TEMPERATURE: 98 F | DIASTOLIC BLOOD PRESSURE: 75 MMHG | SYSTOLIC BLOOD PRESSURE: 135 MMHG | HEIGHT: 71 IN | WEIGHT: 210 LBS | HEART RATE: 88 BPM | BODY MASS INDEX: 29.4 KG/M2 | OXYGEN SATURATION: 98 % | RESPIRATION RATE: 17 BRPM

## 2018-01-25 DIAGNOSIS — R51.9 NONINTRACTABLE HEADACHE, UNSPECIFIED CHRONICITY PATTERN, UNSPECIFIED HEADACHE TYPE: Primary | ICD-10-CM

## 2018-01-25 DIAGNOSIS — R07.81 RIB PAIN ON RIGHT SIDE: ICD-10-CM
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
## 2018-01-25 PROCEDURE — 12011 RPR F/E/E/N/L/M 2.5 CM/<: CPT

## 2018-01-25 PROCEDURE — 25000003 PHARM REV CODE 250: Performed by: SURGERY

## 2018-01-25 PROCEDURE — 99284 EMERGENCY DEPT VISIT MOD MDM: CPT | Mod: 25

## 2018-01-25 RX ORDER — HYDROCODONE BITARTRATE AND ACETAMINOPHEN 10; 325 MG/1; MG/1
1 TABLET ORAL
Status: COMPLETED | OUTPATIENT
Start: 2018-01-25 | End: 2018-01-25

## 2018-01-25 RX ORDER — HYDROCODONE BITARTRATE AND ACETAMINOPHEN 5; 325 MG/1; MG/1
1 TABLET ORAL EVERY 4 HOURS PRN
Qty: 10 TABLET | Refills: 0 | Status: SHIPPED | OUTPATIENT
Start: 2018-01-25 | End: 2018-02-04

## 2018-01-25 RX ADMIN — HYDROCODONE BITARTRATE AND ACETAMINOPHEN 1 TABLET: 10; 325 TABLET ORAL at 04:01

## 2018-01-26 NOTE — ED PROVIDER NOTES
"[facility]                                         January 25, 2018                   Chief Complaint  57 y.o. male with Fall (Patient stated "I fell today at home."); Head Injury; and Rib Injury (right )    History of Present Illness  Bessy Nunez presents to the emergency room with headache and right rib pain today  Patient has had several slips and falls in last year, diagnosis of CNS vasculitis recently  Pt has been seen and evaluated last year for this vasculitis, currently on chemotherapy  Patient states this medicine leaves him weekend prone to fall, fell this a.m. at home  Patient on exam is a small right lateral eyebrow laceration no active bleeding noted  Pt has right rib pain with no bruising or signs of flail chest or fracture, clear lung exam    The history is provided by the patient     Past Medical History   -- Essential hypertension     -- Internuclear ophthalmoplegia of left eye     -- Mixed hyperlipidemia     -- NAFLD (nonalcoholic fatty liver disease)     -- CHASE (nonalcoholic steatohepatitis)     -- Obesity     -- Stroke     -- Type 2 diabetes mellitus with diabetic polyneuropathy        Surgical history: Skin excision  No Known Allergies     Review of Systems and Physical Exam     Review of Systems  -- Constitution - no fever, denies fatigue, no weakness, no chills  -- Eyes - no tearing or redness, no visual disturbance  -- Ear, Nose - no tinnitus or earache, no nasal congestion or discharge  -- Mouth,Throat - no sore throat, no toothache, normal voice, normal swallowing  -- Respiratory - denies cough and congestion, no shortness of breath, no VALENTIN  -- Cardiovascular - denies chest pain, no palpitations, denies claudication  -- Gastrointestinal - denies abdominal pain, nausea, vomiting, or diarrhea  -- Musculoskeletal - right rib pain, negative for myalgias and arthralgias   -- Neurological - headache, denies weakness or seizure; no LOC  -- Skin - denies pallor, rash, or " changes in skin. no hives or welts noted    Vital Signs  -- His oral temperature is 98.4 °F (36.9 °C).   -- His blood pressure is 135/75 and his pulse is 88.   -- His respiration is 17 and oxygen saturation is 98%.      Physical Exam  -- Nursing note and vitals reviewed  -- Constitutional: Appears well-developed and well-nourished  -- Head: Small 2 cm laceration of the right lateral eyebrow area  -- Eyes: Pupils are equal and reactive to light. Normal conjunctiva and lids  -- Nose: Nose normal in appearance, nares grossly normal. No discharge  -- Throat: Mucous membranes moist, pharynx normal, normal tonsils. No lesions   -- Ears: External ears and TM normal bilaterally. Normal hearing and no drainage  -- Neck: Normal range of motion. Neck supple. No masses, trachea midline  -- Cardiac: Normal rate, regular rhythm and normal heart sounds  -- Pulmonary: Normal respiratory effort, breath sounds clear to auscultation  -- Abdominal: Soft, no tenderness. Normal bowel sounds. Normal liver edge  -- Musculoskeletal: Normal range of motion, no effusions. Joints stable   -- Neurological: No focal deficits. Showed good interaction with staff    Emergency Room Course     Laceration Repair  -- Performed by: NATALY VERGARA  -- Consent Done: Emergent Situation  -- Body area: Right eyebrow area  -- Laceration length: 2 cm  -- Tendon involvement: none  -- Nerve involvement: none  -- Vascular damage: no  -- Irrigation solution: saline  -- Amount of cleaning: standard  -- Skin closure: Dermabond    Radiology  -- The CT of the head performed in the ER today was negative for acute pathology  -- Right rib series shows chronic fracture fourth fifth 6 and seventh right ribs  -- Pelvis x-ray showed no evidence of fracture or dislocation     Medications Given  -- PO 10 mg Norco given in today in the ER    Diagnosis  --  Nonintractable headache  -- Fall   -- Rib pain on right side     Disposition and Plan  -- Disposition: home  --  Condition: stable  -- Follow-up: Patient to follow up with Edgar Nova MD in 1-2 days.  -- I advised the patient that we have found no life threatening condition today  -- At this time, I believe the patient is clinically stable for discharge.   -- The patient acknowledges that close follow up with a MD is required   -- Patient agrees to comply with all instruction and direction given in the ER    This note is dictated on Dragon Natural Speaking word recognition program.  There are word recognition mistakes that are occasionally missed on review.           Jayesh Cho MD  01/26/18 0553

## 2018-01-29 ENCOUNTER — CLINICAL SUPPORT (OUTPATIENT)
Dept: REHABILITATION | Facility: HOSPITAL | Age: 58
End: 2018-01-29
Payer: MEDICAID

## 2018-01-29 ENCOUNTER — LAB VISIT (OUTPATIENT)
Dept: LAB | Facility: HOSPITAL | Age: 58
End: 2018-01-29
Attending: INTERNAL MEDICINE
Payer: MEDICAID

## 2018-01-29 DIAGNOSIS — R41.89 COGNITIVE DEFICITS: ICD-10-CM

## 2018-01-29 DIAGNOSIS — T45.1X5A CHEMOTHERAPY-INDUCED NEUTROPENIA: ICD-10-CM

## 2018-01-29 DIAGNOSIS — R49.0 DYSPHONIA: ICD-10-CM

## 2018-01-29 DIAGNOSIS — R26.89 BALANCE PROBLEMS: ICD-10-CM

## 2018-01-29 DIAGNOSIS — R47.01 APHASIA: ICD-10-CM

## 2018-01-29 DIAGNOSIS — R53.1 LEFT-SIDED WEAKNESS: ICD-10-CM

## 2018-01-29 DIAGNOSIS — R26.81 GAIT INSTABILITY: ICD-10-CM

## 2018-01-29 DIAGNOSIS — R29.6 FREQUENT FALLS: ICD-10-CM

## 2018-01-29 DIAGNOSIS — D70.1 CHEMOTHERAPY-INDUCED NEUTROPENIA: ICD-10-CM

## 2018-01-29 LAB
ABO + RH BLD: NORMAL
BASOPHILS # BLD AUTO: 0.03 K/UL
BASOPHILS NFR BLD: 0.7 %
BLD GP AB SCN CELLS X3 SERPL QL: NORMAL
DIFFERENTIAL METHOD: ABNORMAL
EOSINOPHIL # BLD AUTO: 0.1 K/UL
EOSINOPHIL NFR BLD: 2 %
ERYTHROCYTE [DISTWIDTH] IN BLOOD BY AUTOMATED COUNT: 14 %
HCT VFR BLD AUTO: 32.7 %
HGB BLD-MCNC: 11.1 G/DL
IMM GRANULOCYTES # BLD AUTO: 0.01 K/UL
IMM GRANULOCYTES NFR BLD AUTO: 0.2 %
LYMPHOCYTES # BLD AUTO: 0.8 K/UL
LYMPHOCYTES NFR BLD: 17 %
MCH RBC QN AUTO: 28.5 PG
MCHC RBC AUTO-ENTMCNC: 33.9 G/DL
MCV RBC AUTO: 84 FL
MONOCYTES # BLD AUTO: 0.3 K/UL
MONOCYTES NFR BLD: 6.5 %
NEUTROPHILS # BLD AUTO: 3.4 K/UL
NEUTROPHILS NFR BLD: 73.6 %
NRBC BLD-RTO: 0 /100 WBC
PLATELET # BLD AUTO: 176 K/UL
PMV BLD AUTO: 9.5 FL
RBC # BLD AUTO: 3.9 M/UL
WBC # BLD AUTO: 4.59 K/UL

## 2018-01-29 PROCEDURE — 85025 COMPLETE CBC W/AUTO DIFF WBC: CPT

## 2018-01-29 PROCEDURE — 36415 COLL VENOUS BLD VENIPUNCTURE: CPT

## 2018-01-29 PROCEDURE — 92507 TX SP LANG VOICE COMM INDIV: CPT | Mod: PN

## 2018-01-29 PROCEDURE — 86901 BLOOD TYPING SEROLOGIC RH(D): CPT

## 2018-01-29 PROCEDURE — 97110 THERAPEUTIC EXERCISES: CPT | Mod: 59,PN

## 2018-01-29 NOTE — PROGRESS NOTES
OUTPATIENT NEUROLOGICAL REHABILITATION  SPEECH THERAPY PROGRESS NOTE    Date:  1/29/2018     Start Time:  1315  Stop Time:  1400    Subjective/History  Onset Date:  May 2017  Primary Diagnosis:  Lacunar stroke, CNS vasculitis, MS  Treatment Diagnosis:  Aphasia, dysphonia, cognitive deficits.   1. Aphasia     2. Dysphonia     3. Cognitive deficits        Referring Provider:  Dr. Braydon Lowe  Reason for Referral: Speech therapy for evaluation and treatment  Current Medical History:  Bessy Nunez presents to the [facility] secondary to the diagnosis of CNS vasculitis, MS, and lacunar stroke.  Pt and wife report diagnosis of CNS vasculitis and MS came in May 2017 followed by mini strokes.      Precautions:  Fall risk    TIMED  Procedure Min.             UNTIMED  Procedure Min.   Speech/lang tx 45         Total Minutes: 45  Total Timed Units: 0  Total Untimed Units: 1  Charges Billed/# of units: 1    Visit #:  6  Date of Evaluation:  12/5/17  Plan of Care Expiration:   2/5/18  Certification period: Dec 5, 2017 to Feb 5, 2018    Progress/Current Status  Subjective:   Pain: 8 /10  Pt had a fall this weekend. Pain 8/10. Pt has 3 fractured ribs and evident bruise on right eye. Pt does not currently have medication for pain. Reportedly will get some pain medicine when he leaves therapy.     Objective:     Short Term Goals: (8 weeks) Current Progress:   1. Pt will name 15 concrete category members and 10 abstract category members in 60 seconds with min A to improve expressive language.    · States x 9 Independently in 60 seconds; x 18 untimed min A (verbal cues); perseveration x 1  · Cities x 10 Independently in 60 seconds; x 17 untimed min A (verbal cues)  · Food x 10 Independently in 60 seconds; x 25 mod A untimed    Category matrix x 16 with 75% acc Independently; 94% acc min A  Spelling of category matrix with 100% acc Independently     2. Pt will answer orientation  questions with 90% acc given min A to improve cognition.   · Orientation questions x 12 with 75% acc Independently   3. Pt will answer problem solving questions with 80% acc given mod A to improve cognition. GOAL MET x 1 session   · -Not addressed this session.    4. Pt will participate in mental manipulation tasks with 80% acc given mod A to improve cognition.   · 3 word progression: 5/5 for 100% acc with 1 self correction noted  · Alphabetical order x 5 Independently with 40% acc; with visual 3/3 100% acc   5. Pt will sequence 3-4 step picture cards with 80% acc given mod A to improve cognition.    · 3 step (80% acc previously)  · 4 step sequences x 4 events with 75% acc      8. Pt will describe pictures with 85% acc to improve expressive language.      · Describing sequence of events Independently with 67% acc; 80% acc min A        GOALS MET:    7. Pt will follow simple written directions with 80% acc given min A to improve reading comprehension. Goal met       Assessment:   Improvement noted in word finding today, attitude, orientation, timing of responses, affect. Current goals remain appropriate. Goals will be updated as needed.      Mr. Nunez presents with Mild-moderate expressive aphasia c/b disorganized speech, neologisms, Difficulty forming grammatically correct sentences in conversation.  Moderate dysphonia c/b low volume, breathiness, harsh vocal quality. Cognitive deficits c/b difficulty with orientation, immediate recall, reasoning, sequencing/organization, problem solving.  No significant dysphagia noted.  Pt would benefit from continued skilled ST. Prognosis for improvement remains Fair     Patient Education/Response:     Educated on goals and plan of care. Pt reported understanding. Educated pt and wife on important of completing home program to improve progress and outcomes.     Home program: category matrix from [facility].     Plans and Goals:     Continue POC with focus on word finding and  cognition.   Updated POC due 2/5/18.     Carmencita Harman M.S.CCC-SLP  Speech Language Pathologist     1/29/2018

## 2018-01-29 NOTE — PROGRESS NOTES
TIME RECORD    Date: 1/29/2018      Start Time:  2:00  Stop Time:  2:50  PROCEDURES:    TIMED  Procedure Min.   TE 45                     UNTIMED  Procedure Min.             Total Timed Minutes:  45  Total Timed Units:  3  Total Untimed Units:  0  Charges Billed/# of units:  3 TE      Progress/Current Status    Subjective:     Patient ID: Bessy Nunez is a 57 y.o. male.  Diagnosis:   1. Frequent falls     2. Balance problems     3. Gait instability     4. Left-sided weakness       Pain:8/10  Pt reports he suffered a fall on Saturday 1/27/18 trying to  a paper that fell on the ground, resulting in a loss of balance and fall. Pt states he suffered R sided broken ribs and bruised R eye socket. Pt agreeable to modified PT session.     Objective:   Pt initiated session on sci-fit stepper x 8 min for B LE / UE rom (as tolerated) without rest breaks. Pt then instructed on and completed therex as per logged below 1:1 w/ PTA.    DATE 1/29/18 1/24/17 1/22/18 1/10/18 1/5/18 12/26/17 12/21/17   Visit 8 7 6 5 4 3 2   G CODE  POC exp 2/10/17 8/10 7/10 6/10 5/10 4/10 3/10 2/10   FOTO 8/10 7/0 6/10 5/5 4/5 3/5 2/5   Standing feet apart - -- -- -- - -- --   Standing feet together - -- -- -- - -- --   Standing 1/2 tandem - -- -- -- - - --   Standing tandem - -- -- -- - -- --   SLS R - -- -- -- - -- --   SLS L - -- -- -- - -- --   Side stepping Deferred 2 laps  20' / over hurdles 2 laps  20' in gym over ladder 2 laps  20' in gym over ladder  2 laps  20' in gym 4 laps //   Cross overs  -- -- -- - -- --   Step overs deferred 3 laps hurdles / 1 UE support -- -- - -- 1 lap length of orange tiles   Wii fit balance - -- -- -- - -- --   Bosu - -- -- -- - -- --   Beep board - -- -- -- - -- --   Developmental seq - -- -- -- - -- --   SLR - -- -- --  NT no brace 2 x 15 B   clamshells - -- -- --  NT no brace Supine 2 x 10 B RTB   Hip abduction - -- -- ----  -- --   bridges - -- -- --  NT no brace Hold compression fx   Adduction  isometric - -- -- --  NT no brace 5'' x 15   LAQ - - -- -- 3# 2x10 B  2 x 15 2# 2 x 15 B   HS curls - -- -- --  2 x 15 PTB 2 x 15 PTB   HS stretch with strap  3 x 30'' 3 x 30''  --  NT no brace on  MT   Gastroc stretch 3 x 30'' on fitter 3 x 30'' on fitter 3 x 30'' on fitter 3 x 30'' on fitter  3 x 30'' on fitter --   Leg press - -- -- --  -- --   Hip extension 2x10 B 2 x 15 B OOT OOT 2x10 B 2 x 10 B 2 x 10 B   Hip flexion 2x10 B 2 x 10 B 2 x 10 B OOT 2x10 B 2 x 10 B 2 x 10 B   Hip Abduction 2x10 B 2 x 10 B 2 x 10 B 2x10 B 2x10 B 2 x 10 B 2 x 10 B   Hip adduction  -- -- -- - -- --   Knee flexion  -- -- -- - -- --   Toe raises 2x10 B // -- -- -- - -- --   Heel raises 2x10 B // 2 x 10 B 2 x 10 B 2x10 B 2x10 B  x 20 B --   Nu-step  NT 10' L3 10' L3 8' L2 8' L2 8' L2   Step ups X 20 B // BLUE X 20 /  X 20 //  x20 // x20 // X 20 --   Lateral step ups X 20 B // BLUE  X 20 /  X 20// x20 // x20 // X 20 --   Bike  5' L3        INITIALS JA 1/6 FS FS FS JA 1/6 FS FS       Assessment:     Pt tolerated sessio in pain free parameters. He completed all therex with no adverse reactions. Session modified for pt comfort     Patient Education/Response:     CONT HEP    Plans and Goals:     Cont PT as per POC, progress as tolerated.     Short Term Goals = Long Term Goals(8 Weeks)  1. Pt and wife will be indep with initial HEP.   2. Pt will have MMT score of 4/5 in all major ms groups in B LE.   3. Patient will be able to achieve greater than or equal to 15/24 on the DGI using least restrictive device decreasing patient's risk for falling and improving patient to 40-60% impaired, limited, or restricted category.     4. Pt will become a safe community ambulator with least restrictive device with low risk of falls and minimal gait deviations.  5. Pt will complete 6 minute walk test with 0  ft without AD.    6. Pt will report 34% on the FOTO Functional Assessment  indicating increased functional balance and mobility.  7. Pt will  report 75% on abbrve ABC assessment indicating increase in balance confidence.

## 2018-01-31 ENCOUNTER — OFFICE VISIT (OUTPATIENT)
Dept: NEUROLOGY | Facility: CLINIC | Age: 58
End: 2018-01-31
Payer: MEDICAID

## 2018-01-31 ENCOUNTER — CLINICAL SUPPORT (OUTPATIENT)
Dept: REHABILITATION | Facility: HOSPITAL | Age: 58
End: 2018-01-31
Payer: MEDICAID

## 2018-01-31 DIAGNOSIS — R41.89 COGNITIVE DEFICITS: ICD-10-CM

## 2018-01-31 DIAGNOSIS — R49.0 DYSPHONIA: ICD-10-CM

## 2018-01-31 DIAGNOSIS — F03.90 MAJOR NEUROCOGNITIVE DISORDER: ICD-10-CM

## 2018-01-31 DIAGNOSIS — R47.01 APHASIA: ICD-10-CM

## 2018-01-31 PROCEDURE — 96118 PR NEUROPSYCH TESTING BY PSYCH/PHYS: CPT | Mod: PBBFAC | Performed by: PSYCHIATRY & NEUROLOGY

## 2018-01-31 PROCEDURE — 92507 TX SP LANG VOICE COMM INDIV: CPT | Mod: PN

## 2018-01-31 PROCEDURE — 90791 PSYCH DIAGNOSTIC EVALUATION: CPT | Mod: PBBFAC | Performed by: PSYCHIATRY & NEUROLOGY

## 2018-01-31 PROCEDURE — 90791 PSYCH DIAGNOSTIC EVALUATION: CPT | Mod: AH,HB,S$PBB, | Performed by: PSYCHIATRY & NEUROLOGY

## 2018-01-31 PROCEDURE — 99499 UNLISTED E&M SERVICE: CPT | Mod: S$PBB,,, | Performed by: PSYCHIATRY & NEUROLOGY

## 2018-01-31 PROCEDURE — 96118 PR NEUROPSYCH TESTING BY PSYCH/PHYS: CPT | Mod: AH,HB,S$PBB, | Performed by: PSYCHIATRY & NEUROLOGY

## 2018-01-31 NOTE — PROGRESS NOTES
NEUROPSYCHOLOGICAL EVALUATION - CONFIDENTIAL    REFERRAL SOURCE: Beti Boswell,   MEDICAL NECESSITY:  Evaluate cognitive and neuropsychiatric functioning in the setting of CNS vasculitis.    DATE CONDUCTED: 1/31/2018    SOURCES OF INFORMATION:  The following was gathered from a clinical interview with Mr. Bessy Nunez, his wife, and review of the available medical records. Mr. Nunez expressed an understanding of the purpose of the evaluation and consented to all procedures. Total licensed billing psychologists professional time including clinical interview, test administration and interpretation of tests administered by the billing psychologist, integration of test results and other clinical data, preparing the final report, and personally reporting results to the patient   [id number] - 1 hour  [id number] - 1 hour    HISTORY OF PRESENT ILLNESS: Mr. Bessy Nunez is a 57-year-old, right-handed,  male with 14 years of education who was referred for an evaluation to assess his cognitive/behavioral functioning and appropriateness for psychotherapy.     Mr. Nunez has experienced a drastic change in cognitive and daily functioning following a stroke in 5/2017. He has some mild improvements in cognition, but he has not returned to baseline. He requires close to 24/7 supervision from his wife. He primarily described problems with short-term memory. His wife also described difficulties with attention and judgment/impulsivity.     DAILY FUNCTIONING: Mr. Nunez is not currently independent in any complex activities of daily living. His wife manages his medications, appointments, and finances, which are all a major change from baseline. In fact, his wife commented that she had to learn how to manage the finances after the stroke. He is not cooking, which is also a change from baseline. He has not driven since the stroke. He requires close to 24/7 supervision at the present time. He lives with his wife, although their son will  be moving from [place] in the near future to help care for Mr. Nunez.     NEUROPSYCHIATRIC SX: Apathy/reduced initiation. Mr. Nunez spends most of his time sleeping in bed or on the couch. He initiates very few activities, even turning on the television. He no longer uses the television remote control, cellphone, or a computer. Mr. Herrmann wife is especially surprised about the fact that he doesnt care about doing anything, which is a considerable change from his baseline. His wife also described reduced judgment as he has fallen on several occasions when trying to pick things up from the floor even though he has considerable imbalance since the stroke.     MEDICAL HISTORY: DMII, NAFLD, JOHN with CPAP, HTN, HLD, CNS vasculitis. He does not smoke cigarettes. He rarely drinks alcohol with no history of substance abuse.     Brain MRI 2017: 1. Findings compatible with a small acute lacunar infarct adjacent to the left frontal horn.  Nonspecific enhancement just inferior to this region and extending into the left basal ganglia, as well as within the inferior aspect of the left cerebellum.  No evidence of a focal mass. 2.  Extensive increased signal intensity involving the periventricular white matter compatible with demyelinating disease versus vasculitis.    CURRENT MEDICATIONS:     ARIPiprazole (ABILIFY) 5 MG Tab     aspirin (ECOTRIN) 81 MG EC tablet    atenolol (TENORMIN) 50 MG tablet    atorvastatin (LIPITOR) 40 MG tablet    blood sugar diagnostic Strp    blood-glucose meter kit    calcium carbonate (OS-DONNA) 500 mg calcium (1,250 mg) tablet ()    diltiaZEM (DILACOR XR) 240 MG CDCR    hydrocodone-acetaminophen 5-325mg (NORCO) 5-325 mg per tablet    insulin glargine (BASAGLAR KWIKPEN) 100 unit/mL (3 mL) InPn pen    insulin lispro (HUMALOG) 100 unit/mL injection    insulin syringe-needle U-100 (INSULIN SYRINGE) 1/2 mL 30 gauge x 5/16 Syrg    insulin syringe-needle U-100 0.5 mL 31 gauge x 5/16  Syrg    lancets Misc    levETIRAcetam (KEPPRA) 500 MG Tab    liraglutide 0.6 mg/0.1 mL, 18 mg/3 mL, subq PNIJ (VICTOZA 3-TOMASZ) 0.6 mg/0.1 mL (18 mg/3 mL) PnIj    metformin (GLUCOPHAGE-XR) 500 MG 24 hr tablet    omega-3 fatty acids-vitamin E (FISH OIL) 1,000 mg Cap    ONETOUCH DELICA LANCETS 33 gauge Misc    ranitidine (ZANTAC) 150 MG tablet    sertraline (ZOLOFT) 100 MG tablet    sulfamethoxazole-trimethoprim 800-160mg (BACTRIM DS) 800-160 mg Tab    valsartan (DIOVAN) 320 MG tablet    vitamin D 1000 units Tab       PSYCHIATRIC HISTORY: Longstanding history of depression with past suicide attempt (estimated to be in his 20s). At least 1 psychiatric hospitalization. His wife also described completing an IOP in the early 2000s. He was experiencing depression prior to a recent stroke, related to his son living in [place] and his parents recent deaths. He was especially close to his mother, who he called his best friend. Mr. Nunez endorsed feeling sad about his mother, but he otherwise described a euthymic mood. He denied active suicidal ideation, plan, or intent. He has not seen a psychiatrist in the past 10-15 years. He is currently treated with Abilify and Zoloft, prescribed by his PCP. He is not engaged in therapy/counseling.     PSYCHOSOCIAL HISTORY: High school graduate. 2 years of college. Employed as a  for [employer] for many years, retired in 2016.  with 1 son.     BEHAVIORAL OBSERVATIONS/MENTAL STATUS: The test scores are also included in an appendix to this report.      Behaviorally, Mr. Herrmann eyes wandered throughout the interviewing testing, which became especially pronounced in moments that required self-direction. Similarly, she demonstrated limited persistence on tests. For instance, he provided very few responses from 15-60 seconds on 2 verbal fluency trials and began scanning the room by the end of each trial. He did not appear to be scanning the room for a cue, but instead  because he lost of his train of thought and was no longer engaged in the task.     Mr. Nunez was not fully oriented to time. He stated Thursday, February 30, 2018. That date/day was Wednesday, January 31, 2018. He correctly stated the city and hospital he was at, although he later stated that he was in [place] rather than [place]. He had difficulty providing timelines of events as they pertained to his medical history, but he was not completely amnestic. For instance, he recalled that he was in [facility] last week due to a fall.     Mr. Herrmann total score of 18/30 on the MoCA was suggestive of global cognitive dysfunction. He encoded 5/5 words after 2 trials, freely recalling 2/5 following a brief delay. He recalled 1/3 words with categorical cueing and correctly identified the remaining 2 words with multiple choice cueing. He provided 3/5 correct responses on a serial 7 subtraction task. His drawing of a clock face was mildly inaccurate. Mr. Nunez had a disorganized approach to the task as he starting writing in the numbers counterclockwise, stopped at the number 10, and then wrote in the remaining numbers in a clockwise fashion. Several numbers were slightly misplaced due to poor planning. He accurately set the clock hands at the designated time. Letter verbal fluency was below expectation. Conceptual reasoning was grossly intact, although he had difficulty when asked to clarify a mildly complex concept. Confrontation naming was intact. He was unable to complete a brief task of divided attention/set shifting.     Mr. Herrmann total score of 10/18 on the Frontal Assessment Battery (NAVYA), was suggestive of significant frontal lobe dysfunction. He demonstrated poor initiation/persistence on verbal fluency tasks, provided very words after the 15 second rito of the tests. He had difficulty following a task of motor sequencing, even when attempting simultaneously with the examiner. He slightly grabbed  the examiners hand with his left hand during a task of environmental autonomy. He made several errors on a go-no go task.     SUMMARY AND IMPRESSION: Mr. Bessy Nunez is a 57-year-old male who was referred for a consultation to determine the utility of psychotherapy in the setting of post-stroke cognitive dysfunction.     Mr. Herrmann neurobehavioral examination revealed global cognitive decline at the level to warrant a diagnosis of major neurocognitive disorder. This is also consistent with his current level of functioning as he is unable to independently manage any complex activities of daily living.     In regards to the utility of psychotherapy, Mr. Nunez has a longstanding history of depression, with a suicide attempt and psychiatric hospitalization. He endorsed a euthymic mood at the present time, although he also acknowledged that recently losing his mother was difficult was she was his best friend. He can very likely engage and participate in therapy, but it is very important to have realistic expectations for the type of improvements he will experience. For instance, supportive therapy can be beneficial from a mood standpoint, but his current functioning appears primarily compromised by cognitive dysfunction/neuropsychiatric symptoms as opposed to hafsa depression. Specifically, he presents with considerable apathy/reduced initiation drive that can be seen in depression, but appears secondary to the stroke in Mr. Nunez's case. Several recommendations are offered below in hopes of improving his daily functioning, safety, and quality of life.     From a cognitive perspective, Mr. Nunez is still within a window of recovery, although most recovery in this time frame tends to be secondary to both his and his family's adaptations to cognitive changes. Continued therapies and implementation of compensatory mechanisms may aid in improving his level of independence.     DIAGNOSIS:  1. Major Neurocognitive  Disorder    RECOMMENDATIONS:    1. Neuropsychiatric Symptoms - Mr. Nunez appears to be experiencing several neuropsychiatric and cognitive symptoms that are impacting his daily functioning:    Apathy/Reduced initiation and drive - While this can be a symptom of depression, the extent of Mr. Herrmann apathy is very likely neuropsychiatric in nature and secondary to the stroke. As a result, it will be helpful for family members to provide external structure as well as ask him to complete certain tasks (drying the dishes, taking out light amounts of trash). His wife commented that he has no problem completing many things once he is asked. Reduced drive means that he is unlikely to initiate these activities on his own, so he will require a prompt to complete them.     Cognitive Dysfunction - With that said, it is important to only ask Mr. Nunez to complete activities that he can do so safely and effectively. For instance, he should not be asked to manage any complex activities of daily living at the present time, including medication, financial, or appointment management. He should also not be left alone to cook due to inattentiveness. Rather, he is more likely to be successful completing simple, rote activities as described above.      Reduced Judgment/Disinhibition - Mr. Nunez has imbalance since the stroke. He has fallen on almost a weekly basis since the stroke, primarily when he tries to  an item from the ground. Picking up the item is rarely necessary, but he is unable to control the impulse to  the object. For one, it will be important to leave the floor as free of clutter or objects to reduce fall risk. Family should also encourage him to use a walker in the house. Mr. Herrmann wife also noted that he doesnt tie his shoe laces despite multiple prompts to do so given his fall risk. She can consider purchasing Velcro shoes, slip-ons, or take the laces out of his shoes to avoid falls. Ultimately,  reduced judgment appears secondary to cognitive dysfunction, meaning that he may have difficulties with regulating his behaviors. As a result, he appears to require close to 24/7 supervision at the present time as well as environmental modifications to ensure his safety and improve quality of life.     2. Continued Participation in Speech Therapy - For persistent word finding and comprehension difficulties.     3. Consultation with Psychiatry - For medication management of depression and post-stroke neuropsychiatric symptoms highlighted by apathy. He was previously followed by [facility], but has not been seen in about 15 years. An appointment can be scheduled at [phone].      4. Counseling - Mr. Nunez is welcomed to engage in counseling due to sadness around the death of his mother. However, expectations for the therapy should be tempered as it is not believed that his symptoms are completely secondary to depression. Supportive therapy would be helpful. A therapist can also help him develop and implement behavioral/compensatory mechanisms to improve daily functioning.     5. Level of Care - Mr. Nunez requires close to 24/7 supervision at the present time due to cognitive dysfunction, neuropsychiatric symptoms, and fall risk. Home health would be especially beneficial as he is not always responsive to his wifes requests.     6. Neuropsychology Follow-up - Wife and son were welcomed to call at any time to discuss behavioral interventions. Otherwise, a comprehensive neuropsychological in 6-12 months is warranted to establish a more thorough baseline.       I provided Mr. Nunez and his wife my impressions on the day of the evaluation. Their son was also welcomed to call with additional questions.     Thank you for allowing me to participate in Mr. Herrmann care.  If you have any questions, please contact me at [phone].    Silverio Cross Psy.D., ABPP  Board Certified in Clinical  Neuropsychology  [facility] - Department of Neurology    APPENDIX  TESTS ADMINISTERED:  Clinical Interview and Review of Records, Fidencio Cognitive Assessment (MoCA), and the Frontal Assessment Battery (NAVYA).      MoCA    22/30    NAVYA  Conceptualization  3/3  Cognitive Flexibility  1/3  Motor Program  0/3  Contrast Motor  2/3  Inhibitory Control  1/3  Environ. Autonomy  3/3     Total    10/18

## 2018-02-05 ENCOUNTER — CLINICAL SUPPORT (OUTPATIENT)
Dept: REHABILITATION | Facility: HOSPITAL | Age: 58
End: 2018-02-05
Payer: MEDICAID

## 2018-02-05 ENCOUNTER — TELEPHONE (OUTPATIENT)
Dept: NEUROLOGY | Facility: CLINIC | Age: 58
End: 2018-02-05

## 2018-02-05 DIAGNOSIS — R49.0 DYSPHONIA: ICD-10-CM

## 2018-02-05 DIAGNOSIS — R41.89 COGNITIVE DEFICITS: ICD-10-CM

## 2018-02-05 DIAGNOSIS — R47.01 APHASIA: ICD-10-CM

## 2018-02-05 DIAGNOSIS — F32.A DEPRESSION, UNSPECIFIED DEPRESSION TYPE: Primary | ICD-10-CM

## 2018-02-05 NOTE — PLAN OF CARE
Date: 02/05/2018    SPEECH THERAPY UPDATED PLAN OF TREATMENT    Patient name: Bessy Nunez  Onset Date:  May 2017  SOC Date:  12/4/17    Primary Diagnosis: lacunar stroke, CNS vasculitis, MS     Treatment Diagnosis:  1. Aphasia     2. Dysphonia     3. Cognitive deficits           Certification Period:  Dec 5, 2017 to March 9, 2017  Plan of Care Certification Period: 2/5/18 to 4/5/18  Precautions:  Fall  Visits from SOC:  8    Updated Assessment:  Overall improvement noted word finding since initial evaluation. Pt answered orientation questions today with 100% acc. Three word progression activity with 60% acc. Alphabetical order from memory with 60% acc. Sequencing 4 step picture cards with 75% acc. Describing picture cards with 88% acc. Overall improvement noted in attitude and affect since initial evaluation. Pt started with a flat affect and very serious. Pt's wife reported positive changes in personality; improvement noted; however, not back to baseline.     GOALS MET:    7. Pt will follow simple written directions with 80% acc given min A to improve reading comprehension. Goal met  3. Pt will answer problem solving questions with 80% acc given mod A to improve cognition. GOAL MET 1/31/18    Current Goals:   1. Pt will name 15 concrete category members and 10 abstract category members in 60 seconds with min A to improve expressive language. Progressing   2. Pt will answer orientation questions with 90% acc given min A to improve cognition. GOAL MET x 1 session  4. Pt will participate in mental manipulation tasks with 80% acc given mod A to improve cognition. progressing  5. Pt will sequence 3-4 step picture cards with 80% acc given mod A to improve cognition. progressing  8. Pt will describe pictures with 85% acc to improve expressive language. progressing     Long Term Goal Status:   continue per initial plan of care.  Reasons for Recertification of Therapy:   Pt has met 2 goals since initial plan of care. He  is progressing towards all goals. Pt continues with Mild-moderate expressive aphasia c/b disorganized speech, neologisms, Difficulty forming grammatically correct sentences in conversation.  Moderate dysphonia c/b low volume, breathiness, harsh vocal quality. Cognitive deficits c/b difficulty with orientation, immediate recall, reasoning, sequencing/organization, problem solving.  No significant dysphagia noted.  Pt would benefit from continued skilled ST. Prognosis for improvement remains Fair     Recommended Treatment Plan: 2 times per week for 8 weeks: Patient Education, Self Care, Therapeutic Activites and Therapeutic Exercise  Other Recommendations: none at this time.        Therapist's Name:   Carmencita Harman M.S.CCC-SLP  Speech Language Pathologist   Date: 02/05/2018    I CERTIFY THE NEED FOR THESE SERVICES FURNISHED UNDER THIS PLAN OF TREATMENT AND WHILE UNDER MY CARE    Physician's comments: ________________________________________________________________________________________________________________________________________________      Physician's Name: ___________________________________

## 2018-02-05 NOTE — PROGRESS NOTES
OUTPATIENT NEUROLOGICAL REHABILITATION  SPEECH THERAPY PROGRESS NOTE    Date:  2/5/2018     Start Time:  1450  Stop Time:  1530    Subjective/History  Onset Date:  May 2017  Primary Diagnosis:  Lacunar stroke, CNS vasculitis, MS  Treatment Diagnosis:  Aphasia, dysphonia, cognitive deficits.   1. Aphasia     2. Dysphonia     3. Cognitive deficits        Referring Provider:  Dr. Braydon Lowe  Reason for Referral: Speech therapy for evaluation and treatment  Current Medical History:  Bessy Nunez presents to the [facility] secondary to the diagnosis of CNS vasculitis, MS, and lacunar stroke.  Pt and wife report diagnosis of CNS vasculitis and MS came in May 2017 followed by mini strokes.      Precautions:  Fall risk    TIMED  Procedure Min.             UNTIMED  Procedure Min.   Speech/lang tx 40         Total Minutes: 45  Total Timed Units: 0  Total Untimed Units: 1  Charges Billed/# of units: 1    Visit #:  8  Date of Evaluation:  12/5/17  Plan of Care Expiration:   2/5/18  Certification period: Dec 5, 2017 to Feb 5, 2018    Progress/Current Status  Subjective:   Pain: 0/10  Pt denies pain.     Objective:     Short Term Goals: (8 weeks) Current Progress:   1. Pt will name 15 concrete category members and 10 abstract category members in 60 seconds with min A to improve expressive language.    · States x 8 Independently in 60 seconds; x 15 min A untimed  · Cities x 7 Independently in 60 seconds; x 15 min A untimed  · Food x 7 Independently in 60 seconds; x 15 min A untimed      2. Pt will answer orientation questions with 90% acc given min A to improve cognition. GOAL MET x 1 session   · X 12 with 100% acc given min A    4. Pt will participate in mental manipulation tasks with 80% acc given mod A to improve cognition.   · 3 word progression: 3/5 for 60% acc with 1 re-direction to task  · Alphabetical order x 10 with 60% acc with visual   5. Pt will sequence 3-4 step  picture cards with 80% acc given mod A to improve cognition.    · 3 step (80% acc previously)  · 4 step sequences with 75% acc Independently      8. Pt will describe pictures with 85% acc to improve expressive language.      · Describing sequence of events Independently with 88% acc       Additional: Taboo: determining object from description with 100% acc; describing object with max A    GOALS MET:    7. Pt will follow simple written directions with 80% acc given min A to improve reading comprehension. Goal met  3. Pt will answer problem solving questions with 80% acc given mod A to improve cognition. GOAL MET 1/31/18       Assessment:   Decreased accuracy for word finding. Current goals remain appropriate. Goals will be updated as needed.      Mr. Nunez presents with Mild-moderate expressive aphasia c/b disorganized speech, neologisms, Difficulty forming grammatically correct sentences in conversation.  Moderate dysphonia c/b low volume, breathiness, harsh vocal quality. Cognitive deficits c/b difficulty with orientation, immediate recall, reasoning, sequencing/organization, problem solving.  No significant dysphagia noted.  Pt would benefit from continued skilled ST. Prognosis for improvement remains Fair     Patient Education/Response:     Educated on goals and plan of care. Pt reported understanding. Educated pt and wife on important of completing home program to improve progress and outcomes.     Home program: adding to the category from [facility].     Plans and Goals:     Continue POC with focus on word finding and cognition.   Updated POC due 2/5/18.     Carmencita Harman M.S.CCC-SLP  Speech Language Pathologist     2/5/2018

## 2018-02-05 NOTE — TELEPHONE ENCOUNTER
----- Message from Shana Love MD sent at 2/2/2018  6:38 PM CST -----  sharda  ----- Message -----  From: GABRIEL Somers, CNS  Sent: 2/2/2018   3:02 PM  To: Shana Love MD    With Sharda? Or psychiatry?   ----- Message -----  From: Shana Love MD  Sent: 2/1/2018   7:55 PM  To: GABRIEL Somers, [facility]    I think counseling okay  ----- Message -----  From: GABRIEL Somers, CNS  Sent: 1/31/2018   3:21 PM  To: Shana Love MD    Ok to proceed with counseling? Should we recommend this with psychiatry?   ----- Message -----  From: Silverio Cross PsyD  Sent: 1/31/2018   1:29 PM  To: Shana Love MD, #

## 2018-02-07 ENCOUNTER — CLINICAL SUPPORT (OUTPATIENT)
Dept: REHABILITATION | Facility: HOSPITAL | Age: 58
End: 2018-02-07
Attending: INTERNAL MEDICINE
Payer: MEDICAID

## 2018-02-07 DIAGNOSIS — R47.01 APHASIA: ICD-10-CM

## 2018-02-07 DIAGNOSIS — R49.0 DYSPHONIA: ICD-10-CM

## 2018-02-07 DIAGNOSIS — R41.89 COGNITIVE DEFICITS: ICD-10-CM

## 2018-02-07 DIAGNOSIS — R53.1 LEFT-SIDED WEAKNESS: ICD-10-CM

## 2018-02-07 DIAGNOSIS — R26.81 GAIT INSTABILITY: ICD-10-CM

## 2018-02-07 DIAGNOSIS — R29.6 FREQUENT FALLS: ICD-10-CM

## 2018-02-07 DIAGNOSIS — R26.89 BALANCE PROBLEMS: ICD-10-CM

## 2018-02-07 PROCEDURE — 97110 THERAPEUTIC EXERCISES: CPT | Mod: PN

## 2018-02-07 PROCEDURE — 92507 TX SP LANG VOICE COMM INDIV: CPT | Mod: PN

## 2018-02-07 NOTE — PROGRESS NOTES
"TIME RECORD    Date: 2/7/2018      Start Time:  2:45  Stop Time:  3:30  PROCEDURES:    TIMED  Procedure Min.   TE 45                     UNTIMED  Procedure Min.             Total Timed Minutes:  45  Total Timed Units:  3  Total Untimed Units:  0  Charges Billed/# of units:  3 TE      Progress/Current Status    Subjective:     Patient ID: Bessy Nunez is a 57 y.o. male.  Diagnosis:   1. Frequent falls     2. Balance problems     3. Gait instability     4. Left-sided weakness       Pain:pt agreeable to PT session. He reports he is "healing OK" since last session. Pt agreeable to PT session      Objective:   Pt initiated session on sci-fit stepper x 8 min for B LE / UE rom (as tolerated) without rest breaks. Pt then instructed on and completed therex as per logged below 1:1 w/ PTA. Added resisted marching with sports cord (blue) 30"x3 trials Fwd/ Bwd w. SBA    DATE 2/7/18 1/29/18 1/24/17 1/22/18 1/10/18 1/5/18 12/26/17 12/21/17   Visit 9 8 7 6 5 4 3 2   G CODE  POC exp 2/10/17 9/10 NEXT 8/10 7/10 6/10 5/10 4/10 3/10 2/10   FOTO 9/10 NEXT 8/10 7/0 6/10 5/5 4/5 3/5 2/5   Standing feet apart - - -- -- -- - -- --   Standing feet together - - -- -- -- - -- --   Standing 1/2 tandem - - -- -- -- - - --   Standing tandem - - -- -- -- - -- --   SLS R - - -- -- -- - -- --   SLS L - - -- -- -- - -- --   Side stepping  Deferred 2 laps  20' / over hurdles 2 laps  20' in gym over ladder 2 laps  20' in gym over ladder  2 laps  20' in gym 4 laps //   Cross overs   -- -- -- - -- --   Step overs  deferred 3 laps hurdles / 1 UE support -- -- - -- 1 lap length of orange tiles   Wii fit balance - - -- -- -- - -- --   Bosu - - -- -- -- - -- --   Beep board - - -- -- -- - -- --   Developmental seq - - -- -- -- - -- --   SLR - - -- -- --  NT no brace 2 x 15 B   clamshells - - -- -- --  NT no brace Supine 2 x 10 B RTB   Hip abduction - - -- -- ----  -- --   bridges - - -- -- --  NT no brace Hold compression fx   Adduction isometric - - -- -- " "--  NT no brace 5'' x 15   LAQ - - - -- -- 3# 2x10 B  2 x 15 2# 2 x 15 B   HS curls - - -- -- --  2 x 15 PTB 2 x 15 PTB   HS stretch with strap -  3 x 30'' 3 x 30''  --  NT no brace on  MT   Gastroc stretch 30"x3 3 x 30'' on fitter 3 x 30'' on fitter 3 x 30'' on fitter 3 x 30'' on fitter  3 x 30'' on fitter --   Leg press  - -- -- --  -- --   Hip extension 1# 2x10 B 2x10 B 2 x 15 B OOT OOT 2x10 B 2 x 10 B 2 x 10 B   Hip flexion 1# 2x10 B 2x10 B 2 x 10 B 2 x 10 B OOT 2x10 B 2 x 10 B 2 x 10 B   Hip Abduction 1# 2x10 B 2x10 B 2 x 10 B 2 x 10 B 2x10 B 2x10 B 2 x 10 B 2 x 10 B   Wall squats Blue swiss ball  2x10 against wall  -- -- -- - -- --   Knee flexion   -- -- -- - -- --   Toe raises 1# 3x10 B 2x10 B // -- -- -- - -- --   Heel raises 1# 3x10 B 2x10 B // 2 x 10 B 2 x 10 B 2x10 B 2x10 B  x 20 B --   Nu-step   NT 10' L3 10' L3 8' L2 8' L2 8' L2   Step ups X 20 B // BLUE X 20 B // BLUE X 20 /  X 20 //  x20 // x20 // X 20 --   Lateral step ups X 20 B // BLUE X 20 B // BLUE  X 20 /  X 20// x20 // x20 // X 20 --   Bike   5' L3        INITIALS JA 2/6 JA 1/6 FS FS FS JA 1/6 FS FS       Assessment:     Pt tolerated session with no reports of increased pain. Pt responded well to additional resisted balance and strengthening activities.     Patient Education/Response:     CONT HEP    Plans and Goals:     Cont PT as per POC, progress as tolerated.     Short Term Goals = Long Term Goals(8 Weeks)  1. Pt and wife will be indep with initial HEP.   2. Pt will have MMT score of 4/5 in all major ms groups in B LE.   3. Patient will be able to achieve greater than or equal to 15/24 on the DGI using least restrictive device decreasing patient's risk for falling and improving patient to 40-60% impaired, limited, or restricted category.     4. Pt will become a safe community ambulator with least restrictive device with low risk of falls and minimal gait deviations.  5. Pt will complete 6 minute walk test with 0  ft without AD.    6. " Pt will report 34% on the FOTO Functional Assessment  indicating increased functional balance and mobility.  7. Pt will report 75% on abbrve ABC assessment indicating increase in balance confidence.

## 2018-02-07 NOTE — PROGRESS NOTES
OUTPATIENT NEUROLOGICAL REHABILITATION  SPEECH THERAPY PROGRESS NOTE    Date:  2/7/2018     Start Time:  1400  Stop Time:  1445    Subjective/History  Onset Date:  May 2017  Primary Diagnosis:  Lacunar stroke, CNS vasculitis, MS  Treatment Diagnosis:  Aphasia, dysphonia, cognitive deficits.   1. Aphasia     2. Dysphonia     3. Cognitive deficits        Referring Provider:  Dr. Braydon Lowe  Reason for Referral: Speech therapy for evaluation and treatment  Current Medical History:  Bessy Nunez presents to the [facility] secondary to the diagnosis of CNS vasculitis, MS, and lacunar stroke.  Pt and wife report diagnosis of CNS vasculitis and MS came in May 2017 followed by mini strokes.      Precautions:  Fall risk    TIMED  Procedure Min.             UNTIMED  Procedure Min.   Speech/lang tx 45         Total Minutes: 45  Total Timed Units: 0  Total Untimed Units: 1  Charges Billed/# of units: 1    Visit #:  9  Date of Evaluation:  12/5/17  Plan of Care Expiration:   4/5/18    Progress/Current Status  Subjective:     Pain: 0/10  Pt denies pain.     Objective:     Short Term Goals: (8 weeks) Current Progress:   1. Pt will name 15 concrete category members and 10 abstract category members in 60 seconds with min A to improve expressive language.    · Body parts x 15 Independently in 60 seconds  · Colors x 15 Independently in 60 seconds  · Careers x 11 Independently in 60 seconds      2. Pt will answer orientation questions with 90% acc given min A to improve cognition. GOAL MET x 1 session   · X 17 with 82% acc Independently    4. Pt will participate in mental manipulation tasks with 80% acc given mod A to improve cognition.   · 3 word progression: -Not addressed this session.   · Alphabetical order -Not addressed this session.  · Problem solving x 6  · Unscramble with visual 60% acc Independently; 80% acc min A       5. Pt will sequence 3-4 step picture cards with 80% acc  given mod A to improve cognition.    · 3 step (80% acc previously)  · 4 step sequences with 75% acc Independently      8. Pt will describe pictures with 85% acc to improve expressive language. GOAL MET x 1 session     · Describing sequence of events with 100% acc min A       Additional: Taboo: determining object from description with 100% acc; describing object with max A    GOALS MET:    7. Pt will follow simple written directions with 80% acc given min A to improve reading comprehension. Goal met  3. Pt will answer problem solving questions with 80% acc given mod A to improve cognition. GOAL MET 1/31/18       Assessment:   Decreased accuracy for orientation. Improvement noted in naming and describing sequence of events. Current goals remain appropriate. Goals will be updated as needed.      Mr. Nunez presents with Mild-moderate expressive aphasia c/b disorganized speech, neologisms, Difficulty forming grammatically correct sentences in conversation.  Moderate dysphonia c/b low volume, breathiness, harsh vocal quality. Cognitive deficits c/b difficulty with orientation, immediate recall, reasoning, sequencing/organization, problem solving.  No significant dysphagia noted.  Pt would benefit from continued skilled ST. Prognosis for improvement remains Fair     Patient Education/Response:     Educated on goals and plan of care. Pt reported understanding. Educated pt and wife on important of completing home program to improve progress and outcomes.     Home program: adding to the category from [facility] 2.     Plans and Goals:     Continue POC with focus on word finding and cognition.   Updated POC due 4/5/18.     Carmencita Harman M.S.CCC-SLP  Speech Language Pathologist     2/7/2018

## 2018-02-09 ENCOUNTER — LAB VISIT (OUTPATIENT)
Dept: LAB | Facility: HOSPITAL | Age: 58
End: 2018-02-09
Attending: NURSE PRACTITIONER
Payer: MEDICAID

## 2018-02-09 DIAGNOSIS — Z79.4 TYPE 2 DIABETES MELLITUS WITH OTHER NEUROLOGIC COMPLICATION, WITH LONG-TERM CURRENT USE OF INSULIN: ICD-10-CM

## 2018-02-09 DIAGNOSIS — Z13.29 SCREENING FOR HYPOTHYROIDISM: ICD-10-CM

## 2018-02-09 DIAGNOSIS — E11.49 TYPE 2 DIABETES MELLITUS WITH OTHER NEUROLOGIC COMPLICATION, WITH LONG-TERM CURRENT USE OF INSULIN: ICD-10-CM

## 2018-02-09 DIAGNOSIS — E78.5 HYPERLIPIDEMIA, UNSPECIFIED HYPERLIPIDEMIA TYPE: ICD-10-CM

## 2018-02-09 LAB
CHOLEST SERPL-MCNC: 165 MG/DL
CHOLEST/HDLC SERPL: 3.4 {RATIO}
ESTIMATED AVG GLUCOSE: 157 MG/DL
HBA1C MFR BLD HPLC: 7.1 %
HDLC SERPL-MCNC: 49 MG/DL
HDLC SERPL: 29.7 %
LDLC SERPL CALC-MCNC: 48.6 MG/DL
NONHDLC SERPL-MCNC: 116 MG/DL
TRIGL SERPL-MCNC: 337 MG/DL
TSH SERPL DL<=0.005 MIU/L-ACNC: 3.18 UIU/ML

## 2018-02-09 PROCEDURE — 80061 LIPID PANEL: CPT

## 2018-02-09 PROCEDURE — 83036 HEMOGLOBIN GLYCOSYLATED A1C: CPT

## 2018-02-09 PROCEDURE — 84443 ASSAY THYROID STIM HORMONE: CPT

## 2018-02-09 PROCEDURE — 36415 COLL VENOUS BLD VENIPUNCTURE: CPT

## 2018-02-12 ENCOUNTER — CLINICAL SUPPORT (OUTPATIENT)
Dept: REHABILITATION | Facility: HOSPITAL | Age: 58
End: 2018-02-12
Payer: MEDICAID

## 2018-02-12 DIAGNOSIS — R41.89 COGNITIVE DEFICITS: ICD-10-CM

## 2018-02-12 DIAGNOSIS — R49.0 DYSPHONIA: ICD-10-CM

## 2018-02-12 DIAGNOSIS — R47.01 APHASIA: ICD-10-CM

## 2018-02-12 PROCEDURE — 92507 TX SP LANG VOICE COMM INDIV: CPT | Mod: PN

## 2018-02-12 NOTE — PROGRESS NOTES
OUTPATIENT NEUROLOGICAL REHABILITATION  SPEECH THERAPY PROGRESS NOTE    Date:  2/12/2018     Start Time:  1145  Stop Time:  1230    Subjective/History  Onset Date:  May 2017  Primary Diagnosis:  Lacunar stroke, CNS vasculitis, MS  Treatment Diagnosis:  Aphasia, dysphonia, cognitive deficits.   1. Aphasia     2. Dysphonia     3. Cognitive deficits        Referring Provider:  Dr. Braydon Lowe  Reason for Referral: Speech therapy for evaluation and treatment  Current Medical History:  Bessy Nunez presents to the [facility] secondary to the diagnosis of CNS vasculitis, MS, and lacunar stroke.  Pt and wife report diagnosis of CNS vasculitis and MS came in May 2017 followed by mini strokes.      Precautions:  Fall risk    TIMED  Procedure Min.             UNTIMED  Procedure Min.   Speech/lang tx 45         Total Minutes: 45  Total Timed Units: 0  Total Untimed Units: 1  Charges Billed/# of units: 1    Visit #:  10  Date of Evaluation:  12/5/17  Plan of Care Expiration:   4/5/18    Progress/Current Status  Subjective:   Pain: 0/10  Pt denies pain.     Objective:     Short Term Goals: (8 weeks) Current Progress:   1. Pt will name 15 concrete category members and 10 abstract category members in 60 seconds with min A to improve expressive language.    · States x 10 Independently in 60 seconds; x 15 min A untimed  · Cities x 9 Independently in 60 seconds; x 11 min A in 60 seconds  · Food x 11 Independently in 60 seconds  · Animals x 7 Independently in 60 seconds; x 15 min A untimed      2. Pt will answer orientation questions with 90% acc given min A to improve cognition. GOAL MET x 2 session   · X 12 with 100% acc Independently    4. Pt will participate in mental manipulation tasks with 80% acc given mod A to improve cognition.   · 3 word progression: 4/5 for 80% acc with min A  · 3 word Alphabetical order x 5 with 100% acc without visual   5. Pt will sequence 3-4 step  picture cards with 80% acc given mod A to improve cognition.    · 3 step (80% acc previously)  · 4 step sequences with 75% acc Independently; 100% acc min A     8. Pt will describe pictures with 85% acc to improve expressive language. GOAL MET x 2 session     · Describing sequence of events Independently with 94% acc    9. Pt will participate in simple crossword puzzles with 80% acc given min A to improve word finding and organization/cognitive skills.  · New goal   10. Pt will develop a grammatically correct sentences given one word with 80% acc given min A.   · New goal      Additional: Taboo: -Not addressed this session.     GOALS MET:    7. Pt will follow simple written directions with 80% acc given min A to improve reading comprehension. Goal met  3. Pt will answer problem solving questions with 80% acc given mod A to improve cognition. GOAL MET 1/31/18       Assessment:   Met two goals for describing pictures and orientation. Two new goals developed.  Increased accuracy of mental manipulation task. Current goals remain appropriate. Goals will be updated as needed.      Mr. Nunez presents with Mild expressive aphasia c/b disorganized speech, neologisms, Difficulty forming grammatically correct sentences in conversation.  Moderate dysphonia c/b low volume, breathiness, harsh vocal quality. Cognitive deficits c/b difficulty with immediate recall, sequencing/organization.  No significant dysphagia noted.  Pt would benefit from continued skilled ST. Prognosis for improvement remains Fair     Patient Education/Response:     Educated on goals and plan of care. Pt reported understanding. Educated pt and wife on important of completing home program to improve progress and outcomes.     Home program: crossword puzzle and category listing from [facility] 2.     Plans and Goals:     Continue POC with focus on word finding and cognition.   Updated POC due 4/5/18.     Carmencita Harman M.S.CCC-SLP  Speech Language Pathologist      2/12/2018

## 2018-02-14 ENCOUNTER — CLINICAL SUPPORT (OUTPATIENT)
Dept: REHABILITATION | Facility: HOSPITAL | Age: 58
End: 2018-02-14
Payer: MEDICAID

## 2018-02-14 DIAGNOSIS — R47.01 APHASIA: ICD-10-CM

## 2018-02-14 DIAGNOSIS — R41.89 COGNITIVE DEFICITS: ICD-10-CM

## 2018-02-14 DIAGNOSIS — R49.0 DYSPHONIA: ICD-10-CM

## 2018-02-14 PROCEDURE — 92507 TX SP LANG VOICE COMM INDIV: CPT | Mod: PN

## 2018-02-14 NOTE — PROGRESS NOTES
OUTPATIENT NEUROLOGICAL REHABILITATION  SPEECH THERAPY PROGRESS NOTE    Date:  2/14/2018     Start Time:  1445  Stop Time:  1530    Subjective/History  Onset Date:  May 2017  Primary Diagnosis:  Lacunar stroke, CNS vasculitis, MS  Treatment Diagnosis:  Aphasia, dysphonia, cognitive deficits.   1. Aphasia     2. Dysphonia     3. Cognitive deficits        Referring Provider:  Dr. Braydon Lowe  Reason for Referral: Speech therapy for evaluation and treatment  Current Medical History:  Bessy Nunez presents to the [facility] secondary to the diagnosis of CNS vasculitis, MS, and lacunar stroke.  Pt and wife report diagnosis of CNS vasculitis and MS came in May 2017 followed by mini strokes.      Precautions:  Fall risk    TIMED  Procedure Min.             UNTIMED  Procedure Min.   Speech/lang tx 45         Total Minutes: 45  Total Timed Units: 0  Total Untimed Units: 1  Charges Billed/# of units: 1    Visit #:  11  Date of Evaluation:  12/5/17  Plan of Care Expiration:   4/5/18    Progress/Current Status  Subjective:   Pain: 0/10  Pt denies pain.     Objective:     Short Term Goals: (8 weeks) Current Progress:   1. Pt will name 15 concrete category members and 10 abstract category members in 60 seconds with min A to improve expressive language.    · -Not addressed this session.     4. Pt will participate in mental manipulation tasks with 80% acc given mod A to improve cognition.   · Mod A   5. Pt will sequence 3-4 step picture cards with 80% acc given mod A to improve cognition.    · 3 step (80% acc previously)  · 4 step sequences with 25% acc Independently; 100% acc mod A  · Descriptions x 16 with 100% acc independently      9. Pt will participate in simple crossword puzzles with 80% acc given min A to improve word finding and organization/cognitive skills.  · X 6 with 75% acc min A   10. Pt will develop a grammatically correct sentences given one word with 80% acc given  min A.   · X 10 with 60% acc with A      Additional: Taboo: -Not addressed this session.     GOALS MET:    7. Pt will follow simple written directions with 80% acc given min A to improve reading comprehension. Goal met  3. Pt will answer problem solving questions with 80% acc given mod A to improve cognition. GOAL MET 1/31/18   2. Pt will answer orientation questions with 90% acc given min A to improve cognition. GOAL MET x 2 session  8. Pt will describe pictures with 85% acc to improve expressive language. GOAL MET x 2 session           Assessment:   Decrease noted in 4 step sequencing. Continued success describing pictures. Addressed two new goals today for crossword puzzles and developing a sentence given one word. Current goals remain appropriate. Goals will be updated as needed.      Mr. Nunez presents with Mild expressive aphasia c/b disorganized speech, neologisms, Difficulty forming grammatically correct sentences in conversation.  Moderate dysphonia c/b low volume, breathiness, harsh vocal quality. Cognitive deficits c/b difficulty with immediate recall, sequencing/organization.  No significant dysphagia noted.  Pt would benefit from continued skilled ST. Prognosis for improvement remains Fair     Patient Education/Response:     Educated on goals and plan of care. Pt reported understanding. Educated pt and wife on important of completing home program to improve progress and outcomes.     Home program: crossword puzzle and category listing from [facility] 2.     Plans and Goals:     Continue POC with focus on word finding and cognition.   Updated POC due 4/5/18.     Carmencita Harman M.S.CCC-SLP  Speech Language Pathologist     2/14/2018

## 2018-02-15 ENCOUNTER — INFUSION (OUTPATIENT)
Dept: INFUSION THERAPY | Facility: HOSPITAL | Age: 58
End: 2018-02-15
Attending: INTERNAL MEDICINE
Payer: MEDICAID

## 2018-02-15 ENCOUNTER — OFFICE VISIT (OUTPATIENT)
Dept: HEMATOLOGY/ONCOLOGY | Facility: CLINIC | Age: 58
End: 2018-02-15
Payer: MEDICAID

## 2018-02-15 VITALS
WEIGHT: 226.44 LBS | RESPIRATION RATE: 18 BRPM | DIASTOLIC BLOOD PRESSURE: 96 MMHG | HEIGHT: 71 IN | HEART RATE: 93 BPM | BODY MASS INDEX: 31.7 KG/M2 | SYSTOLIC BLOOD PRESSURE: 151 MMHG | OXYGEN SATURATION: 95 % | TEMPERATURE: 98 F

## 2018-02-15 DIAGNOSIS — E78.2 MIXED HYPERLIPIDEMIA: ICD-10-CM

## 2018-02-15 DIAGNOSIS — Z74.09 IMPAIRED FUNCTIONAL MOBILITY, BALANCE, GAIT, AND ENDURANCE: ICD-10-CM

## 2018-02-15 DIAGNOSIS — I77.6 CNS VASCULITIS: Primary | ICD-10-CM

## 2018-02-15 DIAGNOSIS — I10 ESSENTIAL HYPERTENSION: ICD-10-CM

## 2018-02-15 DIAGNOSIS — E11.42 TYPE 2 DIABETES MELLITUS WITH DIABETIC POLYNEUROPATHY, WITH LONG-TERM CURRENT USE OF INSULIN: ICD-10-CM

## 2018-02-15 DIAGNOSIS — R26.81 GAIT INSTABILITY: ICD-10-CM

## 2018-02-15 DIAGNOSIS — Z79.4 TYPE 2 DIABETES MELLITUS WITH DIABETIC POLYNEUROPATHY, WITH LONG-TERM CURRENT USE OF INSULIN: ICD-10-CM

## 2018-02-15 DIAGNOSIS — E66.9 OBESITY (BMI 30-39.9): ICD-10-CM

## 2018-02-15 PROBLEM — R27.0 ATAXIA: Status: RESOLVED | Noted: 2017-05-13 | Resolved: 2018-02-15

## 2018-02-15 PROBLEM — R47.01 APHASIA: Status: RESOLVED | Noted: 2017-12-04 | Resolved: 2018-02-15

## 2018-02-15 PROBLEM — S32.041A: Status: RESOLVED | Noted: 2017-11-22 | Resolved: 2018-02-15

## 2018-02-15 PROBLEM — Z91.81 HISTORY OF RECENT FALL: Status: RESOLVED | Noted: 2017-06-14 | Resolved: 2018-02-15
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
## 2018-02-15 PROBLEM — S32.049A L4 VERTEBRAL FRACTURE: Status: RESOLVED | Noted: 2017-11-20 | Resolved: 2018-02-15

## 2018-02-15 PROBLEM — R74.01 TRANSAMINITIS: Status: RESOLVED | Noted: 2017-09-30 | Resolved: 2018-02-15

## 2018-02-15 PROCEDURE — 63600175 PHARM REV CODE 636 W HCPCS: Performed by: INTERNAL MEDICINE

## 2018-02-15 PROCEDURE — 96367 TX/PROPH/DG ADDL SEQ IV INF: CPT

## 2018-02-15 PROCEDURE — 99215 OFFICE O/P EST HI 40 MIN: CPT | Mod: PBBFAC,25 | Performed by: NURSE PRACTITIONER

## 2018-02-15 PROCEDURE — 25000003 PHARM REV CODE 250: Performed by: INTERNAL MEDICINE

## 2018-02-15 PROCEDURE — 3008F BODY MASS INDEX DOCD: CPT | Mod: ,,, | Performed by: NURSE PRACTITIONER

## 2018-02-15 PROCEDURE — 96413 CHEMO IV INFUSION 1 HR: CPT

## 2018-02-15 PROCEDURE — 99213 OFFICE O/P EST LOW 20 MIN: CPT | Mod: S$PBB,,, | Performed by: NURSE PRACTITIONER

## 2018-02-15 PROCEDURE — 99999 PR PBB SHADOW E&M-EST. PATIENT-LVL V: CPT | Mod: PBBFAC,,, | Performed by: NURSE PRACTITIONER

## 2018-02-15 RX ORDER — HEPARIN 100 UNIT/ML
500 SYRINGE INTRAVENOUS
Status: CANCELLED | OUTPATIENT
Start: 2018-02-15

## 2018-02-15 RX ORDER — HEPARIN 100 UNIT/ML
500 SYRINGE INTRAVENOUS
Status: DISCONTINUED | OUTPATIENT
Start: 2018-02-15 | End: 2018-02-15 | Stop reason: HOSPADM

## 2018-02-15 RX ORDER — SODIUM CHLORIDE 0.9 % (FLUSH) 0.9 %
10 SYRINGE (ML) INJECTION
Status: DISCONTINUED | OUTPATIENT
Start: 2018-02-15 | End: 2018-02-15 | Stop reason: HOSPADM

## 2018-02-15 RX ORDER — SODIUM CHLORIDE 0.9 % (FLUSH) 0.9 %
10 SYRINGE (ML) INJECTION
Status: CANCELLED | OUTPATIENT
Start: 2018-02-15

## 2018-02-15 RX ADMIN — DEXAMETHASONE SODIUM PHOSPHATE: 4 INJECTION, SOLUTION INTRAMUSCULAR; INTRAVENOUS at 11:02

## 2018-02-15 RX ADMIN — CYCLOPHOSPHAMIDE 1350 MG: 500 INJECTION, POWDER, FOR SOLUTION INTRAVENOUS; ORAL at 11:02

## 2018-02-15 RX ADMIN — MESNA 1350 MG: 100 INJECTION, SOLUTION INTRAVENOUS at 11:02

## 2018-02-15 NOTE — PLAN OF CARE
Problem: Patient Care Overview  Goal: Individualization & Mutuality  Outcome: Ongoing (interventions implemented as appropriate)  Patient arrived to clinic accompanied by significant other. No s/s distress. Patient pending Cytoxan infusion. Will continue to monitor.

## 2018-02-15 NOTE — PROGRESS NOTES
SECTION OF HEMATOLOGY AND BONE MARROW TRANSPLANT  New Patient Visit   02/15/2018  Referred by:  No ref. provider found  Referred for:     CHIEF COMPLAINT:   Chief Complaint   Patient presents with    Encounter for chemotherapy management    Results       HISTORY OF PRESENT ILLNESS:   Presents for cycle #7  HD CTX for MS. He states he has been tolerating the previous cycles well. He does note that he is fatigued all the time. Denies fevers. Denies nausea, vomiting, constipation and diarrhea. Denies night sweats, bleeding from nose, urine and stool.  Labs reviewed. Patient examined.  No contraindication to proceed with CTX.   Patient appears pleasant but  confused during appt.     PAST MEDICAL HISTORY:   Past Medical History:   Diagnosis Date    CNS vasculitis 6/2/2017    Follows with Dr. Love By mri.  Several active lesions, many old. 5/13: MRA brain/neck, MRI brain w/wo contrast show no vascular occlusion, multiple foci of hyperintensity in deep cerebral periventricular white matter in pattern of demyelinating process.  5/17: MRI spine performed, no spinal cord lesions. Hospitalization 6/2017. EEG was performed negative for seizures.  Repeat MRI showing new lesion, question whether this was demyelination versus CVA. 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 Findings compatible with a small acute lacunar infarct adjacent to the left frontal horn.  Nonspecific enhancement just inferior to this region and extending into the left basal ganglia, as well as within the inferior aspect of the left cerebellum.  No evidence of a focal mass. 2.  Extensive increased signal intensity involving the periventricular white matter compatible with demyelinating disease versus vasculitis. 3.  Clinical correlation and  followup recommended. 4.  This reportedly flattened in the EPIC and the corpus callosum medical record system.    Mixed hyperlipidemia 11/9/2012    NAFLD (nonalcoholic fatty liver disease) 10/11/2013    CHASE (nonalcoholic steatohepatitis)     Obesity     Stroke     Type 2 diabetes mellitus with diabetic polyneuropathy, with long-term current use of insulin 5/14/2017    Type 2 diabetes mellitus with hyperglycemia, with long-term current use of insulin 10/11/2013       PAST SURGICAL HISTORY:   Past Surgical History:   Procedure Laterality Date    SKIN CANCER EXCISION         PAST SOCIAL HISTORY:   reports that he has never smoked. He has never used smokeless tobacco. He reports that he does not drink alcohol or use drugs.    FAMILY HISTORY:  Family History   Problem Relation Age of Onset    Heart disease Mother     Hypertension Mother     Heart failure Mother     Cancer Father      lung    Diabetes Maternal Aunt        CURRENT MEDICATIONS:   Current Outpatient Prescriptions   Medication Sig    ARIPiprazole (ABILIFY) 5 MG Tab Take 1 tablet (5 mg total) by mouth once daily.    aspirin (ECOTRIN) 81 MG EC tablet Take 81 mg by mouth once daily.    atenolol (TENORMIN) 50 MG tablet Take 1 tablet (50 mg total) by mouth once daily.    atorvastatin (LIPITOR) 40 MG tablet Take 1 tablet (40 mg total) by mouth once daily.    blood sugar diagnostic Strp To check BG 4 times daily, to use with insurance preferred meter    blood-glucose meter kit To check BG 4 times daily, one touch verio meter. Dx code e11.65    diltiaZEM (DILACOR XR) 240 MG CDCR Take 1 capsule (240 mg total) by mouth once daily.    insulin glargine (BASAGLAR KWIKPEN) 100 unit/mL (3 mL) InPn pen Inject 32 Units into the skin 2 (two) times daily. Once in the morning at 9 am and once in the evening at 9 pm.    insulin lispro (HUMALOG) 100 unit/mL injection Inject 20 units before meals    insulin needles, disposable, (BD ULTRA-FINE ANA PEN  "NEEDLES) 32 x 5/32 " Ndle Inject twice daily    insulin syringe-needle U-100 (INSULIN SYRINGE) 1/2 mL 30 gauge x 5/16 Syrg Use 3x/day    insulin syringe-needle U-100 0.5 mL 31 gauge x 5/16 Syrg     lancets Misc To check BG 4 times daily, one touch verio meter. Dx code e11.65    levETIRAcetam (KEPPRA) 500 MG Tab TAKE ONE TABLET BY MOUTH TWICE DAILY    liraglutide 0.6 mg/0.1 mL, 18 mg/3 mL, subq PNIJ (VICTOZA 3-TOMASZ) 0.6 mg/0.1 mL (18 mg/3 mL) PnIj Inject 1.8 mg into the skin once daily.    metformin (GLUCOPHAGE-XR) 500 MG 24 hr tablet Take 2 tablets (1,000 mg total) by mouth 2 (two) times daily with meals.    NOVOFINE PLUS 32 gauge x 1/6" Ndle     omega-3 fatty acids-vitamin E (FISH OIL) 1,000 mg Cap Take 1 capsule by mouth 2 (two) times daily.    ONETOUCH DELICA LANCETS 33 gauge Misc     ranitidine (ZANTAC) 150 MG tablet Take 1 tablet (150 mg total) by mouth 2 (two) times daily.    sertraline (ZOLOFT) 100 MG tablet 1 and half tablet daily (Patient taking differently: Take 150 mg by mouth once daily. )    sulfamethoxazole-trimethoprim 800-160mg (BACTRIM DS) 800-160 mg Tab Take every Monday, Wednesday, and Friday.  Infectious Disease will decide if this is needed in 3-4 months.    valsartan (DIOVAN) 320 MG tablet Take 1 tablet (320 mg total) by mouth once daily.    vitamin D 1000 units Tab Take 5 tablets (5,000 Units total) by mouth once daily.    calcium carbonate (OS-DONNA) 500 mg calcium (1,250 mg) tablet Take 2 tablets (1,000 mg total) by mouth once.     No current facility-administered medications for this visit.      ALLERGIES:   Review of patient's allergies indicates:  No Known Allergies          REVIEW OF SYSTEMS:   General ROS: Positive for fatigue.   Psychological ROS: Positive for confusion.   Ophthalmic ROS: negative  ENT ROS: negative  Allergy and Immunology ROS: negative  Hematological and Lymphatic ROS: negative  Endocrine ROS: negative  Respiratory ROS: negative  Cardiovascular ROS: " negative  Gastrointestinal ROS: negative  Genito-Urinary ROS: negative  Musculoskeletal ROS: negative  Neurological ROS: see HPI  Dermatological ROS: negative    PHYSICAL EXAM:   Vitals:    02/15/18 0900   BP: (!) 151/96   Pulse: 93   Resp: 18   Temp: 98.1 °F (36.7 °C)       General - well developed, well nourished, no apparent distress  Head & Face - no sinus tenderness  Eyes - normal conjunctivae and lids   ENT - normal external auditory canals  Chest and Lung - normal respiratory effort, clear to auscultation bilaterally   Cardiovascular - RRR with no MGR, normal S1 and S2; no pedal edema  Abdomen -  soft, nontender, no palpable hepatomegaly or splenomegaly  Lymph - no palpable lymphadenopathy  Extremities - unremarkable nails and digits  Heme - no bruising, petechiae, pallor  Skin - no rashes or lesions  Psych - Depressed mood and affect. Confusion noted.      ECOG Performance Status: (foot note - ECOG PS provided by Eastern Cooperative Oncology Group) 1 - Symptomatic but completely ambulatory    Karnofsky Performance Score:  90%- Able to Carry on Normal Activity: Minor Symptoms of Disease  DATA:   Lab Results   Component Value Date    WBC 8.86 02/15/2018    HGB 11.7 (L) 02/15/2018    HCT 33.5 (L) 02/15/2018    MCV 82 02/15/2018     02/15/2018     Gran # (ANC)   Date Value Ref Range Status   02/15/2018 5.9 1.8 - 7.7 K/uL Final     Gran%   Date Value Ref Range Status   02/15/2018 66.1 38.0 - 73.0 % Final     CMP  Sodium   Date Value Ref Range Status   02/15/2018 141 136 - 145 mmol/L Final     Potassium   Date Value Ref Range Status   02/15/2018 3.6 3.5 - 5.1 mmol/L Final     Chloride   Date Value Ref Range Status   02/15/2018 104 95 - 110 mmol/L Final     CO2   Date Value Ref Range Status   02/15/2018 24 23 - 29 mmol/L Final     Glucose   Date Value Ref Range Status   02/15/2018 110 70 - 110 mg/dL Final     BUN, Bld   Date Value Ref Range Status   02/15/2018 17 6 - 20 mg/dL Final     Creatinine   Date  Value Ref Range Status   02/15/2018 1.0 0.5 - 1.4 mg/dL Final     Calcium   Date Value Ref Range Status   02/15/2018 9.9 8.7 - 10.5 mg/dL Final     Total Protein   Date Value Ref Range Status   02/15/2018 7.4 6.0 - 8.4 g/dL Final     Albumin   Date Value Ref Range Status   02/15/2018 3.5 3.5 - 5.2 g/dL Final   10/11/2013 4.3 3.6 - 5.1 g/dL Final     Comment:     @ Test Performed By:  [facility]  Cici Estrada M.D., JAYCOB.,   [address] [place] [id number]  [facility] #[id number]     Total Bilirubin   Date Value Ref Range Status   02/15/2018 0.6 0.1 - 1.0 mg/dL Final     Comment:     For infants and newborns, interpretation of results should be based  on gestational age, weight and in agreement with clinical  observations.  Premature Infant recommended reference ranges:  Up to 24 hours.............<8.0 mg/dL  Up to 48 hours............<12.0 mg/dL  3-5 days..................<15.0 mg/dL  6-29 days.................<15.0 mg/dL       Alkaline Phosphatase   Date Value Ref Range Status   02/15/2018 91 55 - 135 U/L Final     AST   Date Value Ref Range Status   02/15/2018 20 10 - 40 U/L Final     ALT   Date Value Ref Range Status   02/15/2018 16 10 - 44 U/L Final     Anion Gap   Date Value Ref Range Status   02/15/2018 13 8 - 16 mmol/L Final     eGFR if    Date Value Ref Range Status   02/15/2018 >60.0 >60 mL/min/1.73 m^2 Final     eGFR if non    Date Value Ref Range Status   02/15/2018 >60.0 >60 mL/min/1.73 m^2 Final     Comment:     Calculation used to obtain the estimated glomerular filtration  rate (eGFR) is the CKD-EPI equation.            ASSESSMENT AND PLAN:   Encounter Diagnoses   Name Primary?    CNS vasculitis Yes    Essential hypertension     Mixed hyperlipidemia     Obesity (BMI 30-39.9)     Type 2 diabetes mellitus with diabetic polyneuropathy, with long-term current use of insulin     Gait instability     Impaired functional  mobility, balance, gait, and endurance      -proceed with HDCTX 600mg/m2 dose #7 with mesna support per CNS vasculitis, protocol per Dr. Love  -per neurology, plan for approximate 1 year of monthly therapy      Follow Up:  Cbc, cmp, type and screen lab only in 2 weeks  -same labs, Nurse practioner  appt, chair for cytoxan #8  infusion in 4 weeks     Nathalia Funk NP  Hematology/Oncology/Bone Marrow Transplant

## 2018-02-15 NOTE — Clinical Note
Cbc, cmp, only in 2 weeks -same labs, Nurse practioner  appt, chair for cytoxan #8  infusion in 4 weeks

## 2018-02-16 NOTE — PLAN OF CARE
Problem: Patient Care Overview  Goal: Plan of Care Review  Outcome: Ongoing (interventions implemented as appropriate)  Patient tolerated cytoxan and mesna well. Patient and significant other given AVS,instructed to call MD if any concerns.

## 2018-02-19 ENCOUNTER — OFFICE VISIT (OUTPATIENT)
Dept: INTERNAL MEDICINE | Facility: CLINIC | Age: 58
End: 2018-02-19
Payer: MEDICAID

## 2018-02-19 VITALS
WEIGHT: 222.69 LBS | BODY MASS INDEX: 31.18 KG/M2 | HEART RATE: 100 BPM | SYSTOLIC BLOOD PRESSURE: 112 MMHG | RESPIRATION RATE: 16 BRPM | HEIGHT: 71 IN | DIASTOLIC BLOOD PRESSURE: 72 MMHG

## 2018-02-19 DIAGNOSIS — E11.42 TYPE 2 DIABETES MELLITUS WITH DIABETIC POLYNEUROPATHY, WITH LONG-TERM CURRENT USE OF INSULIN: ICD-10-CM

## 2018-02-19 DIAGNOSIS — K76.0 NAFLD (NONALCOHOLIC FATTY LIVER DISEASE): ICD-10-CM

## 2018-02-19 DIAGNOSIS — F32.A DEPRESSION, UNSPECIFIED DEPRESSION TYPE: ICD-10-CM

## 2018-02-19 DIAGNOSIS — Z79.4 TYPE 2 DIABETES MELLITUS WITH DIABETIC POLYNEUROPATHY, WITH LONG-TERM CURRENT USE OF INSULIN: ICD-10-CM

## 2018-02-19 DIAGNOSIS — S32.040A CLOSED COMPRESSION FRACTURE OF FOURTH LUMBAR VERTEBRA, INITIAL ENCOUNTER: ICD-10-CM

## 2018-02-19 DIAGNOSIS — E66.9 OBESITY (BMI 30-39.9): ICD-10-CM

## 2018-02-19 DIAGNOSIS — R41.89 COGNITIVE DEFICITS: ICD-10-CM

## 2018-02-19 DIAGNOSIS — I10 ESSENTIAL HYPERTENSION: ICD-10-CM

## 2018-02-19 DIAGNOSIS — Z74.09 IMPAIRED FUNCTIONAL MOBILITY, BALANCE, GAIT, AND ENDURANCE: ICD-10-CM

## 2018-02-19 DIAGNOSIS — I77.6 CNS VASCULITIS: Primary | ICD-10-CM

## 2018-02-19 DIAGNOSIS — E78.2 MIXED HYPERLIPIDEMIA: ICD-10-CM

## 2018-02-19 DIAGNOSIS — I77.6 CNS VASCULITIS: ICD-10-CM

## 2018-02-19 PROCEDURE — 99999 PR PBB SHADOW E&M-EST. PATIENT-LVL V: CPT | Mod: PBBFAC,,, | Performed by: NURSE PRACTITIONER

## 2018-02-19 PROCEDURE — 99215 OFFICE O/P EST HI 40 MIN: CPT | Mod: PBBFAC | Performed by: NURSE PRACTITIONER

## 2018-02-19 PROCEDURE — 3008F BODY MASS INDEX DOCD: CPT | Mod: ,,, | Performed by: NURSE PRACTITIONER

## 2018-02-19 PROCEDURE — 99214 OFFICE O/P EST MOD 30 MIN: CPT | Mod: S$PBB,,, | Performed by: NURSE PRACTITIONER

## 2018-02-19 RX ORDER — VENLAFAXINE HYDROCHLORIDE 75 MG/1
75 CAPSULE, EXTENDED RELEASE ORAL DAILY
Qty: 30 CAPSULE | Refills: 1 | Status: SHIPPED | OUTPATIENT
Start: 2018-02-19 | End: 2018-04-23 | Stop reason: SDUPTHER

## 2018-02-19 RX ORDER — INSULIN GLARGINE 100 [IU]/ML
40 INJECTION, SOLUTION SUBCUTANEOUS NIGHTLY
Qty: 2 BOX | Refills: 2 | Status: SHIPPED | OUTPATIENT
Start: 2018-02-19 | End: 2018-08-01 | Stop reason: SDUPTHER

## 2018-02-19 NOTE — PROGRESS NOTES
Subjective:       Patient ID: Bessy Nunez is a 58 y.o. male.    Chief Complaint: Follow-up (Medication) and Fatigue    HPI: Pt presents to clinic today known to me now with his wife as he is not a reliable source for information. His son is moving from [place] today to help take care of him.     They have not had [facility] neuro f/u yet that is later this month. Never could get the abilify approved. Still on increased dose of zoloft. Dr Love has said NO MS just vasculitis.     Still following with [facility] onc for his chemo( to treat the vasculitis). Last week was most recent. He will need 1 year of treatment. Cont bactrim prophylactic till chemo x 1 year,    He has not gotten home health yet due to no one in that area takes his insurance. He is still going to [facility] for his speech. He is on break from PT due to recent falls causing rib fractures and back fracture    He is also having endo adjust meds. He had his insulin decreased but on the prednisone his bs was high so wife wants to  back up on meds. He has been cut back to 25 units basaglar daily but he is running 170-200. Still on metformin and victoza. Cont novolog 20 before meals    She would like to change his zoloft. Has been on it for years, used to work. Since losing his mother and now all this he has been depressed. . He denies thoughts of hurting self or others. Abilify was added but not approved.   Review of Systems   Constitutional: Negative for chills and fever.   HENT: Negative for congestion, postnasal drip and sore throat.    Eyes: Negative for photophobia.   Respiratory: Negative for chest tightness and shortness of breath.    Cardiovascular: Negative for chest pain.   Gastrointestinal: Negative for abdominal distention, abdominal pain, blood in stool and vomiting.   Genitourinary: Negative for dysuria, flank pain and hematuria.   Musculoskeletal: Negative for back pain.   Skin: Negative for pallor.   Neurological: Negative for dizziness, seizures, facial  asymmetry, speech difficulty and numbness.   Hematological: Does not bruise/bleed easily.   Psychiatric/Behavioral: Positive for confusion. Negative for agitation and suicidal ideas. The patient is not nervous/anxious.        Objective:      Physical Exam   Constitutional: He is oriented to person, place, and time. He appears well-developed and well-nourished.   HENT:   Head: Normocephalic and atraumatic.   Neck: No JVD present.   Cardiovascular: Normal rate, regular rhythm and normal heart sounds.    No murmur heard.  Pulmonary/Chest: Effort normal and breath sounds normal. No respiratory distress. He has no wheezes. He has no rales.   Abdominal: Soft. Bowel sounds are normal. There is no tenderness.   Musculoskeletal: He exhibits no edema.   Neurological: He is alert and oriented to person, place, and time.   Skin: Skin is warm and dry. Capillary refill takes less than 2 seconds.   Psychiatric: He has a normal mood and affect. His behavior is normal. Judgment and thought content normal.   Nursing note and vitals reviewed.      Assessment:       1. Uncontrolled type 2 diabetes mellitus without complication, with long-term current use of insulin    2. Depression, unspecified depression type    3. Essential hypertension    4. Mixed hyperlipidemia    5. NAFLD (nonalcoholic fatty liver disease)    6. Obesity (BMI 30-39.9)    7. Type 2 diabetes mellitus with diabetic polyneuropathy, with long-term current use of insulin    8. CNS vasculitis    9. Impaired functional mobility, balance, gait, and endurance    10. Closed compression fracture of fourth lumbar vertebra, initial encounter    11. Cognitive deficits        Plan:   Bessy was seen today for follow-up and fatigue.    Diagnoses and all orders for this visit:    Uncontrolled type 2 diabetes mellitus without complication, with long-term current use of insulin  -     insulin glargine (BASAGLAR KWIKPEN U-100 INSULIN) 100 unit/mL (3 mL) InPn pen; Inject 40 Units into  the skin every evening.  -     Ambulatory consult to Diabetic Education    Depression, unspecified depression type  -     venlafaxine (EFFEXOR-XR) 75 MG 24 hr capsule; Take 1 capsule (75 mg total) by mouth once daily.    Essential hypertension    Mixed hyperlipidemia    NAFLD (nonalcoholic fatty liver disease)    Obesity (BMI 30-39.9)    Type 2 diabetes mellitus with diabetic polyneuropathy, with long-term current use of insulin    CNS vasculitis    Impaired functional mobility, balance, gait, and endurance    Closed compression fracture of fourth lumbar vertebra, initial encounter    Cognitive deficits    She is very concerned about his fatigue, I have adjusted DM meds as his bs has been running high since steroids. This may help. Also I would question neuro about keppra. This could be contributing to fatigue. Looks like this is prophylactic due to stroke/vasculitis. She will discuss this with neuro. Also his zoloft. He has been on same dose x years. Will change to SNRI and see f this helps. The other issue is they had to return CPAP because he was not using it. Will try that again. F/u 4 weeks

## 2018-02-21 ENCOUNTER — CLINICAL SUPPORT (OUTPATIENT)
Dept: REHABILITATION | Facility: HOSPITAL | Age: 58
End: 2018-02-21
Payer: MEDICAID

## 2018-02-21 DIAGNOSIS — R53.1 LEFT-SIDED WEAKNESS: ICD-10-CM

## 2018-02-21 DIAGNOSIS — R29.6 FREQUENT FALLS: ICD-10-CM

## 2018-02-21 DIAGNOSIS — R49.0 DYSPHONIA: ICD-10-CM

## 2018-02-21 DIAGNOSIS — R41.89 COGNITIVE DEFICITS: ICD-10-CM

## 2018-02-21 DIAGNOSIS — R26.89 BALANCE PROBLEMS: ICD-10-CM

## 2018-02-21 PROCEDURE — 92507 TX SP LANG VOICE COMM INDIV: CPT | Mod: PN

## 2018-02-21 PROCEDURE — 97110 THERAPEUTIC EXERCISES: CPT | Mod: 59,PN

## 2018-02-21 NOTE — PROGRESS NOTES
TIME RECORD    Date: 2/21/2018      Start Time:  1445  Stop Time:  1530    PROCEDURES:    TIMED  Procedure Min.   TE 45                     UNTIMED  Procedure Min.             Total Timed Minutes:  45  Total Timed Units:  3  Total Untimed Units:  0  Charges Billed/# of units:  3 TE      Progress/Current Status    Subjective:     Patient ID: Bessy Nunez is a 58 y.o. male.  Diagnosis:   1. Frequent falls     2. Balance problems     3. Left-sided weakness       Pain: 0 /10  Pt denied any falls this week.      Objective:     Pt initiated session on sci-fit stepper x 8 min for B LE / UE rom (as tolerated) without rest breaks. Pt performed NMR x 10' per log. Pt then instructed on and completed therex as per logged below 1:1 w/ PT x 27.     DATE 2/21/18 2/7/18 1/29/18 1/24/17 1/22/18 1/10/18 1/5/18 12/26/17 12/21/17   Visit 10 9 8 7 6 5 4 3 2     POC exp 2/10/17    9/10 NEXT 8/10 7/10 6/10 5/10 4/10 3/10 2/10   FOTO done 9/10 NEXT 8/10 7/0 6/10 5/5 4/5 3/5 2/5   Standing feet apart -- - - -- -- -- - -- --   Standing feet together -- - - -- -- -- - -- --   Standing 1/2 tandem -- - - -- -- -- - - --   Standing tandem -- - - -- -- -- - -- --   SLS R -- - - -- -- -- - -- --   SLS L -- - - -- -- -- - -- --   Side stepping 2 laps  20' in gym over ladder  Deferred 2 laps  20' / over hurdles 2 laps  20' in gym over ladder 2 laps  20' in gym over ladder  2 laps  20' in gym 4 laps //   Cross overs --   -- -- -- - -- --   Step overs 2 laps  20' in gym over ladder  deferred 3 laps hurdles / 1 UE support -- -- - -- 1 lap length of orange tiles   Wii fit balance -- - - -- -- -- - -- --   Bosu -- - - -- -- -- - -- --   Beep board -- - - -- -- -- - -- --   Developmental seq - - - -- -- -- - -- --   SLR -- - - -- -- --  NT no brace 2 x 15 B   clamshells -- - - -- -- --  NT no brace Supine 2 x 10 B RTB   Hip abduction -- - - -- -- ----  -- --   bridges -- - - -- -- --  NT no brace Hold compression fx   Adduction isometric -- - - -- -- --  " NT no brace 5'' x 15   LAQ -- - - - -- -- 3# 2x10 B  2 x 15 2# 2 x 15 B   HS curls -- - - -- -- --  2 x 15 PTB 2 x 15 PTB   HS stretch with strap -- -  3 x 30'' 3 x 30''  --  NT no brace on  MT   Gastroc stretch 30"x3 30"x3 3 x 30'' on fitter 3 x 30'' on fitter 3 x 30'' on fitter 3 x 30'' on fitter  3 x 30'' on fitter --   Leg press --  - -- -- --  -- --   Hip extension OOT 1# 2x10 B 2x10 B 2 x 15 B OOT OOT 2x10 B 2 x 10 B 2 x 10 B   Hip flexion OOT 1# 2x10 B 2x10 B 2 x 10 B 2 x 10 B OOT 2x10 B 2 x 10 B 2 x 10 B   Hip Abduction 1.5# 2x10 B 1# 2x10 B 2x10 B 2 x 10 B 2 x 10 B 2x10 B 2x10 B 2 x 10 B 2 x 10 B   Wall squats next Blue swiss ball  2x10 against wall  -- -- -- - -- --   Knee flexion --   -- -- -- - -- --   Toe raises 1.5# 3x10  1# 3x10 B 2x10 B // -- -- -- - -- --   Heel raises 1.5# 3x10  1# 3x10 B 2x10 B // 2 x 10 B 2 x 10 B 2x10 B 2x10 B  x 20 B --   Nu-step L3 10'   NT 10' L3 10' L3 8' L2 8' L2 8' L2   Step ups X 20 B // BLUE X 20 B // BLUE X 20 B // BLUE X 20 /  X 20 //  x20 // x20 // X 20 --   Lateral step ups X 20 B // BLUE X 20 B // BLUE X 20 B // BLUE  X 20 /  X 20// x20 // x20 // X 20 --   Bike --   5' L3        INITIALS FS JA 2/6 JA 1/6 FS FS FS JA 1/6 FS FS       Assessment:     Pt is progressing but PT will cont to progress balance to reduce fall risk.     Patient Education/Response:     CONT HEP    Plans and Goals:   Cont PT as per POC, progress as tolerated.     Short Term Goals = Long Term Goals(8 Weeks)  1. Pt and wife will be indep with initial HEP.   2. Pt will have MMT score of 4/5 in all major ms groups in B LE.   3. Patient will be able to achieve greater than or equal to 15/24 on the DGI using least restrictive device decreasing patient's risk for falling and improving patient to 40-60% impaired, limited, or restricted category.     4. Pt will become a safe community ambulator with least restrictive device with low risk of falls and minimal gait deviations.  5. Pt will complete 6 " minute walk test with 0  ft without AD.    6. Pt will report 34% on the FOTO Functional Assessment  indicating increased functional balance and mobility.  7. Pt will report 75% on abbrve ABC assessment indicating increase in balance confidence.

## 2018-02-21 NOTE — PROGRESS NOTES
OUTPATIENT NEUROLOGICAL REHABILITATION  SPEECH THERAPY PROGRESS NOTE    Date:  2/21/2018     Start Time:  1410  Stop Time:  1445    Subjective/History  Onset Date:  May 2017  Primary Diagnosis:  Lacunar stroke, CNS vasculitis, MS  Treatment Diagnosis:  Aphasia, dysphonia, cognitive deficits.   1. Dysphonia     2. Cognitive deficits        Referring Provider:  Dr. Braydon Lowe  Reason for Referral: Speech therapy for evaluation and treatment  Current Medical History:  Bessy Nunez presents to the [facility] secondary to the diagnosis of CNS vasculitis, MS, and lacunar stroke.  Pt and wife report diagnosis of CNS vasculitis and MS came in May 2017 followed by mini strokes.      Precautions:  Fall risk    TIMED  Procedure Min.             UNTIMED  Procedure Min.   Speech/lang tx 35         Total Minutes: 35  Total Timed Units: 0  Total Untimed Units: 1  Charges Billed/# of units: 1    Visit #:  12  Date of Evaluation:  12/5/17  Plan of Care Expiration:   4/5/18    Progress/Current Status  Subjective:   Pain: 0/10  Pt denies pain. Pt was ten minutes late to today's session.    Objective:     Short Term Goals: (8 weeks) Current Progress:   1. Pt will name 15 concrete category members and 10 abstract category members in 60 seconds with min A to improve expressive language.    · States x 12 Independently in 60 seconds; plus perseveration noted x 1.   · Kitchen items x 9 Independently in 60 seconds; x 15 min A untimed  · Hot x 2 Independently in 60 seconds; x 13 mod A untimed  · Tall x 4 Independently in 60 seconds; x 8 mod A untimed  · Expensive x 3 Independently in 60 seconds; x 10 mod A untimed     4. Pt will participate in mental manipulation tasks with 80% acc given mod A to improve cognition.   · Immediate recall of 4 unrelated words x 4 with 100% acc  · Ranking 3 words with 75% acc given mod A   5. Pt will sequence 3-4 step picture cards with 80% acc given mod A to  improve cognition. MET GOAL x 1 session   · 3 step (80% acc previously met)  · 4 step sequences with 100% acc Independently  · Descriptions x 16 with 100% acc independently      9. Pt will participate in simple crossword puzzles with 80% acc given min A to improve word finding and organization/cognitive skills.  · -Not addressed this session.    10. Pt will develop a grammatically correct sentences given one word with 80% acc given min A.   · Simple words X 10 with 80% acc with A      Additional: Taboo: -Not addressed this session.     GOALS MET:    7. Pt will follow simple written directions with 80% acc given min A to improve reading comprehension. Goal met  3. Pt will answer problem solving questions with 80% acc given mod A to improve cognition. GOAL MET 1/31/18   2. Pt will answer orientation questions with 90% acc given min A to improve cognition. GOAL MET x 2 session  8. Pt will describe pictures with 85% acc to improve expressive language. GOAL MET x 2 session      Assessment:   Impovement noted in sequencing 4 steps and describing pictures; met goal x 1 session.  Improvement noted developing grammatically correct sentences given one word.  Difficulty noted with abstract category naming.  Current goals remain appropriate. Goals will be updated as needed.      Mr. Nunez presents with Mild expressive aphasia c/b disorganized speech, neologisms; improvement noted. Difficulty forming grammatically correct sentences in conversation; improvement noted.  Moderate dysphonia c/b low volume, breathiness, harsh vocal quality; improvement noted. Cognitive deficits c/b difficulty with immediate recall, sequencing/organization; improvement noted.  No significant dysphagia noted.  Pt would benefit from continued skilled ST. Prognosis for improvement remains Fair     Patient Education/Response:     Educated on goals and plan of care. Pt reported understanding. Educated pt and wife on important of completing home program to  improve progress and outcomes.     Home program: crossword puzzle and category listing from [facility] 2, Copyright 2002, [employer]     Plans and Goals:     Continue POC with focus on word finding and cognition.   Updated POC due 4/5/18.     Carmencita Harman M.S.CCC-SLP  Speech Language Pathologist     2/21/2018

## 2018-02-22 RX ORDER — INSULIN LISPRO 100 [IU]/ML
INJECTION, SOLUTION INTRAVENOUS; SUBCUTANEOUS
Qty: 2 VIAL | Refills: 3 | Status: SHIPPED | OUTPATIENT
Start: 2018-02-22 | End: 2018-04-30

## 2018-02-26 ENCOUNTER — TELEPHONE (OUTPATIENT)
Dept: NEUROLOGY | Facility: CLINIC | Age: 58
End: 2018-02-26

## 2018-02-26 ENCOUNTER — OFFICE VISIT (OUTPATIENT)
Dept: NEUROLOGY | Facility: CLINIC | Age: 58
End: 2018-02-26
Payer: MEDICAID

## 2018-02-26 VITALS
SYSTOLIC BLOOD PRESSURE: 106 MMHG | HEART RATE: 116 BPM | BODY MASS INDEX: 31.5 KG/M2 | DIASTOLIC BLOOD PRESSURE: 76 MMHG | HEIGHT: 71 IN | WEIGHT: 225 LBS

## 2018-02-26 DIAGNOSIS — I77.6 VASCULITIS, CNS: Primary | ICD-10-CM

## 2018-02-26 DIAGNOSIS — Z86.73 HISTORY OF COMPLETED STROKE: ICD-10-CM

## 2018-02-26 DIAGNOSIS — R41.89 COGNITIVE IMPAIRMENT: ICD-10-CM

## 2018-02-26 DIAGNOSIS — Z79.899 HIGH RISK MEDICATION USE: ICD-10-CM

## 2018-02-26 DIAGNOSIS — Z71.89 COUNSELING REGARDING GOALS OF CARE: ICD-10-CM

## 2018-02-26 DIAGNOSIS — I77.6 CNS VASCULITIS: ICD-10-CM

## 2018-02-26 DIAGNOSIS — R47.02 DYSPHASIA: ICD-10-CM

## 2018-02-26 DIAGNOSIS — R26.89 POOR BALANCE: ICD-10-CM

## 2018-02-26 PROCEDURE — 99215 OFFICE O/P EST HI 40 MIN: CPT | Mod: S$PBB,,, | Performed by: PSYCHIATRY & NEUROLOGY

## 2018-02-26 PROCEDURE — 99999 PR PBB SHADOW E&M-EST. PATIENT-LVL III: CPT | Mod: PBBFAC,,, | Performed by: PSYCHIATRY & NEUROLOGY

## 2018-02-26 PROCEDURE — 99213 OFFICE O/P EST LOW 20 MIN: CPT | Mod: PBBFAC | Performed by: PSYCHIATRY & NEUROLOGY

## 2018-02-26 NOTE — Clinical Note
Sharda / Guillermina, this pt has CNS Vasculitis, and cognitive issues;  Medicaid currently; recently was approved for SSDI;  Pt's wife thinks Medicare is coming soon-brittany;  Also she mentions something about long term home health?  Thinking she's speaking about Medicaid waiver program; pt's wife not sure about this;  Of course might lose Medicaid now; please call them as she needs help understanding; thanks

## 2018-02-26 NOTE — PROGRESS NOTES
Subjective:       Patient ID: Bessy Nunez is a 57 y.o. male who presents today for a routine clinic visit for CNS vasculitis; he is accompanied by his wife and son.  He was last seen in October 2017.     - Slowly improving, continues to receive PT and speech therapy   - Changed zoloft to effexor last week, with no clear changes yet - scheduled to meet with Bernarda this week (Wednesday)  - Difficult to get him to participate in ADLs - have to force him to bathe, brush teeth, shave. Requires constant reminders   - they feel that if they weren't around he would just lie around all day  - He's still very irritable and stubborn  - Have tried to get home health (nursing, diabetes management) but have been unable to get it  - Tolerating cytoxan well - his wife reports that for a few days after each treatment he has a drastic improvement   - 2 falls recently - January he fell and hit his head, broke 4 ribs; November/December fell and broke vertebrae     Pt recently received SSDI.     He ran out of prednisone in Jan; wife said she tried to refill via pharmacy, but did not hear back;     SOCIAL HISTORY          Social History   Substance Use Topics    Smoking status: Never Smoker    Smokeless tobacco: Never Used    Alcohol use No      Living arrangements - the patient lives with his spouse         Objective:      Neurologic Exam     25 foot timed walk: 12.96 with rollator;   MS: continues to have flat affect; does not initiate much conversation  Extraocular movements are intact.   Facial movements are normal. He has normal strength in upper and lower extremities. Rapid sequential movements are normal in the upper extremities.   Reflexes are 2+ and symmetric throughout.   Gait is steady (does not use a cane today).   Romberg is positive.   Finger to nose coordination is normal.   He is able to follow 3 step commands.     Labs:            Lab Results   Component Value Date     WBC 8.21 10/18/2017     HGB 11.2 (L)  10/18/2017     HCT 32.9 (L) 10/18/2017     MCV 84 10/18/2017      (L) 10/18/2017      CMP        Sodium   Date Value Ref Range Status   10/18/2017 140 136 - 145 mmol/L Final            Potassium   Date Value Ref Range Status   10/18/2017 4.5 3.5 - 5.1 mmol/L Final            Chloride   Date Value Ref Range Status   10/18/2017 107 95 - 110 mmol/L Final            CO2   Date Value Ref Range Status   10/18/2017 26 23 - 29 mmol/L Final            Glucose   Date Value Ref Range Status   10/18/2017 168 (H) 70 - 110 mg/dL Final            BUN, Bld   Date Value Ref Range Status   10/18/2017 21 (H) 6 - 20 mg/dL Final            Creatinine   Date Value Ref Range Status   10/18/2017 0.9 0.5 - 1.4 mg/dL Final            Calcium   Date Value Ref Range Status   10/18/2017 9.1 8.7 - 10.5 mg/dL Final            Total Protein   Date Value Ref Range Status   10/18/2017 6.5 6.0 - 8.4 g/dL Final              Albumin   Date Value Ref Range Status   10/18/2017 3.2 (L) 3.5 - 5.2 g/dL Final   10/11/2013 4.3 3.6 - 5.1 g/dL Final       Comment:       @ Test Performed By:  [employer] Vera Vance Estrada M.D., JAYCOB.,   [address] [place] [id number]  [facility] #[id number]              Total Bilirubin   Date Value Ref Range Status   10/18/2017 0.8 0.1 - 1.0 mg/dL Final       Comment:       For infants and newborns, interpretation of results should be based  on gestational age, weight and in agreement with clinical  observations.  Premature Infant recommended reference ranges:  Up to 24 hours.............<8.0 mg/dL  Up to 48 hours............<12.0 mg/dL  3-5 days..................<15.0 mg/dL  6-29 days.................<15.0 mg/dL            Alkaline Phosphatase   Date Value Ref Range Status   10/18/2017 87 55 - 135 U/L Final            AST   Date Value Ref Range Status   10/18/2017 23 10 - 40 U/L Final            ALT   Date Value Ref Range Status   10/18/2017 53 (H) 10 - 44 U/L Final             Anion Gap   Date Value Ref Range Status   10/18/2017 7 (L) 8 - 16 mmol/L Final            eGFR if    Date Value Ref Range Status   10/18/2017 >60.0 >60 mL/min/1.73 m^2 Final              eGFR if non    Date Value Ref Range Status   10/18/2017 >60.0 >60 mL/min/1.73 m^2 Final       Comment:       Calculation used to obtain the estimated glomerular filtration  rate (eGFR) is the CKD-EPI equation. Since race is unknown   in our information system, the eGFR values for   -American and Non--American patients are given   for each creatinine result.            Diagnosis   1. CNS Vasculitis  2. Cognitive dysfunction  3. Depression        Discussion   1. Continue monthly pulse Cytoxan;  We would like to continue on this protocol for about a year. Pt is improving, but still has long way to go to get back to cognitive baseline.   2.Physical therapy at [facility]  3. Repeat MRA brain  4. EEG    F/u 2 mo Beti Boswell CNS    Over 50% of the 40 minute visit was spent counseling the patient and his wife about his diagnosis and treatment plan

## 2018-02-27 ENCOUNTER — TELEPHONE (OUTPATIENT)
Dept: NEUROLOGY | Facility: CLINIC | Age: 58
End: 2018-02-27

## 2018-02-28 ENCOUNTER — TELEPHONE (OUTPATIENT)
Dept: HEMATOLOGY/ONCOLOGY | Facility: CLINIC | Age: 58
End: 2018-02-28

## 2018-02-28 ENCOUNTER — HOSPITAL ENCOUNTER (OUTPATIENT)
Dept: RADIOLOGY | Facility: HOSPITAL | Age: 58
Discharge: HOME | End: 2018-02-28
Attending: PSYCHIATRY & NEUROLOGY
Payer: MEDICAID

## 2018-02-28 ENCOUNTER — HOSPITAL ENCOUNTER (OUTPATIENT)
Dept: NEUROLOGY | Facility: CLINIC | Age: 58
Discharge: HOME | End: 2018-02-28
Payer: MEDICAID

## 2018-02-28 ENCOUNTER — INITIAL CONSULT (OUTPATIENT)
Dept: NEUROLOGY | Facility: CLINIC | Age: 58
End: 2018-02-28
Payer: MEDICAID

## 2018-02-28 DIAGNOSIS — F02.80 MAJOR NEUROCOGNITIVE DISORDER DUE TO ANOTHER MEDICAL CONDITION: ICD-10-CM

## 2018-02-28 DIAGNOSIS — Z63.9 FAMILY DISTRESS: ICD-10-CM

## 2018-02-28 DIAGNOSIS — Z86.73 HISTORY OF COMPLETED STROKE: ICD-10-CM

## 2018-02-28 DIAGNOSIS — I77.6 VASCULITIS, CNS: ICD-10-CM

## 2018-02-28 DIAGNOSIS — R47.02 DYSPHASIA: ICD-10-CM

## 2018-02-28 DIAGNOSIS — F32.A DEPRESSIVE DISORDER: Primary | ICD-10-CM

## 2018-02-28 PROCEDURE — 70544 MR ANGIOGRAPHY HEAD W/O DYE: CPT | Mod: TC

## 2018-02-28 PROCEDURE — 70544 MR ANGIOGRAPHY HEAD W/O DYE: CPT | Mod: 26,,, | Performed by: RADIOLOGY

## 2018-02-28 PROCEDURE — 90791 PSYCH DIAGNOSTIC EVALUATION: CPT | Mod: PBBFAC | Performed by: SOCIAL WORKER

## 2018-02-28 PROCEDURE — 95819 EEG AWAKE AND ASLEEP: CPT | Mod: 26,S$PBB,, | Performed by: PSYCHIATRY & NEUROLOGY

## 2018-02-28 PROCEDURE — 95819 PR EEG,W/AWAKE & ASLEEP RECORD: ICD-10-PCS | Mod: 26,S$PBB,, | Performed by: PSYCHIATRY & NEUROLOGY

## 2018-02-28 PROCEDURE — 95819 EEG AWAKE AND ASLEEP: CPT | Mod: PBBFAC | Performed by: PSYCHIATRY & NEUROLOGY

## 2018-02-28 PROCEDURE — 90791 PSYCH DIAGNOSTIC EVALUATION: CPT | Mod: AJ,HB,S$PBB, | Performed by: SOCIAL WORKER

## 2018-02-28 SDOH — SOCIAL DETERMINANTS OF HEALTH (SDOH): PROBLEM RELATED TO PRIMARY SUPPORT GROUP, UNSPECIFIED: Z63.9

## 2018-02-28 NOTE — TELEPHONE ENCOUNTER
----- Message from Yaquelin Kitchen sent at 2/28/2018 10:10 AM CST -----  Contact: Pt's wife  Pt's wife is calling to r/s labs and can be reached at [phone].    Thank you  Spoke with pt's wife, lab appointment was rescheduled to today per request.  Yamileth

## 2018-02-28 NOTE — PROGRESS NOTES
"Psychiatry Initial Visit (PhD/LCSW)  Diagnostic Interview - CPT 66639    Date: 2/28/2018    Site: [facility]    Referral source: Beti Boswell, CNS, neurology provider at [facility]    Clinical status of patient: Outpatient    Bessy Nunez, a 58 y.o. male, for initial evaluation visit.  Met with patient and obtained additional information from a review of available medical records.  Mr. Nunez had another appointment immediately following this initial visit, so I will contact his wife, by phone, for additional information..    Chief complaint/reason for encounter: depression, behavior, cognition and interpersonal    History of present illness: Mr. Nunez was referred to counseling by his [facility] outpatient neurology provider for concerns of depression and cognitive impairments following a stroke, secondary to CNS vasculitis, in May 2017.  Mr. Nunez was evaluated by Silverio Cross PsyD to assess his appropriateness for counseling.  He was diagnosed with a major neurocognitive disorder, but it was felt that he could participate in supportive therapy.  Mr. Nunez has a lengthy history of depression, as documented below, and according to his wife is currently experiencing increased apathy and loss of interest in almost all activities, hypersomnia (~ 20 hours/day), decreased appetite, and difficulties with attention, judgement and impulsivity.  She also describes him as irritable.  Per assessment of Dr. Cross the degree of Mr. Nunez's apathy is likely neuropsychiatric in nature, secondary to his stroke.  Mr. Nunez currently describes his mood as "difficulty, with all these appointments" and "catching up since my stroke".  When asked whether he feels happy, sad or angry he replied, "I don't know".  Mr. Nunez denies feelings of worthlessness or excessive guilt and denies thoughts of death or suicidal ideation.  He does report increased appetite, hypersomnia and difficulty with short-term memory.  (A review of Mr. " "Enrique's EMR does not reveal significant weight loss or gain over the past 2 months).  He also describes some frustration with his loss of independence in sharing that "my wife is always asking me questions and asking what I'm doing".        Pain: Not assessed.    Symptoms:   · Mood: diminished interest, hypersomnia and poor concentration  · Anxiety: denied  · Substance abuse: denied  · Cognitive functioning: major neurocognitive disorder with symptoms of apathy/decreased initiation, impulsivity, short-term memory difficulties  · Health behaviors: noncontributory    Psychiatric history: prior inpatient treatment, prior suicide attempt(s) and has participated in counseling/psychotherapy on an outpatient basis in the past   Mr. Nunez reports a history of depression dating back to his late teens/early twenties.  He and wife confirm suicide attempt in early twenties involving overdose on pills and wrist cutting.  His scars are visible.  Mr. Nunez was unable to clearly state the reason for his attempt, but his wife advised that "his mother was angry and not speaking with him," so he attempted suicide.  He was hospitalized at [facility] for a couple of weeks.  Mr. Nunez did see two mental health practitioners (unclear if psychiatrists) through his employer and he spent some time in an intensive outpatient program.  One outpatient follow up note was located in his medical records indicating a history of Major Depressive Disorder and treatment with Zoloft.  He is currently prescribed Effexor XR by his family medicine provider, ROCKY Reyes NP.        Medical history: Stroke in May 2017 secondary to CNS Vasculitis.  Previous stroke history documented in earlier medical records.  Hypertension, hyperlipidemia, CHASE, diabetes type 2. Closed compression fracture of fourth lumbar vertebra.    Family history of psychiatric illness: Mother - depression and addiction to prescription medications (opiods, diet pills); Father - substance " "abuse; Sister - substance abuse   Mrs. Nunez shared that her  and his parents were "closer than what is normal" and explained that his parents partied, a lot, and introduced Mr. Nunez to drugs.      Social history (marriage, employment, etc.): Mr. Nunez was born and raised in [place] by his parents.  His mother was sixteen at the time of his birth.  He has one younger sister, who currently resides in [place], [place].  He graduated high school and attended 1-2 years at [employer].  He has been  to his wife, Bekah, for 35 years and they have one son, Marky (28).  They currently live in [place], and their son recently moved home from [place] to help his parents.  Mr. Nunez's father  ~ 3 years ago of lung cancer, and his mother  more recently of congestive heart failure.  Mr. Nunez was very close to his mother, describing her as "my best friend".  Mr. Nunez was employed as a  for [employer] for 30+years.  He was offshore for 7 day increments.  His wife was employed in law enforcement but has not worked since 2017.  Mr. Nunez was recently awarded [employer] disability benefits.     Mr. Nunez previously enjoyed fishing, reading, watching television and playing on his computer.  His wife shared that he does not do any of these activities, now.  He identifies as Mormon and shared that he attends [employer], occasionally.  He currently requires almost 24-7 supervision.  He participates in physical and speech therapies at [facility].      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    Current Evaluation:     Mental Status Exam:  General Appearance:  casually dressed, neatly groomed, obese, seated in wheelchair   Speech: aphasic, disorganized speech      Level of Cooperation: cooperative      Thought Processes: perseverative, disorganized   Mood: "difficulty"       Thought Content: normal, no suicidality, no homicidality, delusions, or paranoia   Affect: blunted   Orientation: Oriented to person, Oriented to place, Not oriented to time (partially oriented - year and month only)   Memory: recent >  impaired   Attention Span & Concentration: impaired   Fund of General Knowledge: impaired   Abstract Reasoning: impaired   Judgment & Insight: poor     Language  aphasia     Diagnostic Impression - Plan:       ICD-10-CM ICD-9-CM   1. Depressive disorder F32.9 311   2. Major neurocognitive disorder due to another medical condition F02.80 294.9   3. Family distress Z63.9 V61.9       Plan:individual psychotherapy, family psychotherapy and medication management by physician    Return to Clinic: 1 week    Length of Service (minutes): 60  "

## 2018-03-12 ENCOUNTER — TELEPHONE (OUTPATIENT)
Dept: REHABILITATION | Facility: HOSPITAL | Age: 58
End: 2018-03-12

## 2018-03-12 NOTE — PROCEDURES
DATE OF SERVICE:  02/28/2018    DURATION:  29 minutes and 38 seconds.    ELECTROENCEPHALOGRAM REPORT    METHODOLOGY:  Electroencephalographic (EEG) recording is recorded with   electrodes placed according to the International 10-20 placement system.  Thirty   two (32) channels of digital signal (sampling rate of 512/sec), including T1   and T2, were simultaneously recorded from the scalp and may include EKG, EMG,   and/or eye monitors.  Recording band pass was 0.1 to 512 Hz.  Digital video   recording of the patient is simultaneously recorded with the EEG.  The patient   is instructed to report clinical symptoms which may occur during the recording   session.  EEG and video recording are stored and archived in digital format.    Activation procedures, which include photic stimulation, hyperventilation and   instructing patients to perform simple tasks, are done in selected patients.    The EEG is displayed on a monitor screen and can be reviewed using different   montages.  Computer assisted-analysis is employed to detect spike and   electrographic seizure activity.   The entire record is submitted for computer   analysis.  The entire recording is visually reviewed, and the times identified   by computer analysis as being spikes or seizures are reviewed again.      Compressed spectral analysis (CSA) is also performed on the activity recorded   from each individual channel.  This is displayed as a power display of   frequencies from 0 to 30 Hz over time.   The CSA is reviewed looking for   asymmetries in power between homologous areas of the scalp, then compared with   the original EEG recording.      [employer] software was also utilized in the review of this study.  This software   suite analyzes the EEG recording in multiple domains.  Coherence and rhythmicity   are computed to identify EEG sections which may contain organized seizures.    Each channel undergoes analysis to detect the presence of spike and sharp  waves   which have special and morphological characteristics of epileptic activity.  The   routine EEG recording is converted from special into frequency domain.  This is   then displayed comparing homologous areas to identify areas of significant   asymmetry.  Algorithm to identify non-cortically generated artifact is used to   separate artifact from the EEG.      EEG FINDINGS:  The background of this study contains mixed frequency activity of   medium amplitude with a 10 Hz posterior dominant rhythm seen and mixed alpha   and beta activity seen anteriorly.  There are runs of rhythmic delta activity,   which is generalized at about 3.5 Hz, which are seen few times during this study   and may be associated with drowsiness.  These runs of FIRDA are more commonly   seen in the first part of the study.  In the latter half when the patient is   more awake, the background tends to consist more of an alpha and beta pattern.    Overall, the record is symmetrical.  There are no epileptiform discharges.    There are no seizures.  There are no focal findings.  EKG reveals regular RR   interval.    INTERPRETATION:  Abnormal EEG due to the appearance of FIRDA seen several times   during the waking record, which indicates a mild encephalopathy.  There were no   focal findings and no evidence of seizures.          /lisa [id number] blank(s)          NBB/HN  dd: 03/06/2018 15:09:59 (CST)  td: 03/06/2018 23:48:42 (CST)  Doc ID   #[id number]  Job ID #[id number]    CC:

## 2018-03-15 ENCOUNTER — INFUSION (OUTPATIENT)
Dept: INFUSION THERAPY | Facility: HOSPITAL | Age: 58
End: 2018-03-15
Attending: INTERNAL MEDICINE
Payer: MEDICAID

## 2018-03-15 ENCOUNTER — OFFICE VISIT (OUTPATIENT)
Dept: HEMATOLOGY/ONCOLOGY | Facility: CLINIC | Age: 58
End: 2018-03-15
Payer: MEDICAID

## 2018-03-15 ENCOUNTER — LAB VISIT (OUTPATIENT)
Dept: LAB | Facility: HOSPITAL | Age: 58
End: 2018-03-15
Attending: INTERNAL MEDICINE
Payer: MEDICAID

## 2018-03-15 VITALS
OXYGEN SATURATION: 98 % | BODY MASS INDEX: 31.73 KG/M2 | HEIGHT: 71 IN | HEART RATE: 93 BPM | WEIGHT: 226.63 LBS | TEMPERATURE: 98 F | RESPIRATION RATE: 16 BRPM | DIASTOLIC BLOOD PRESSURE: 87 MMHG | SYSTOLIC BLOOD PRESSURE: 135 MMHG

## 2018-03-15 VITALS
HEIGHT: 71 IN | TEMPERATURE: 98 F | BODY MASS INDEX: 31.73 KG/M2 | HEART RATE: 95 BPM | SYSTOLIC BLOOD PRESSURE: 126 MMHG | RESPIRATION RATE: 16 BRPM | WEIGHT: 226.63 LBS | DIASTOLIC BLOOD PRESSURE: 87 MMHG

## 2018-03-15 DIAGNOSIS — I77.6 CNS VASCULITIS: Primary | ICD-10-CM

## 2018-03-15 DIAGNOSIS — Z51.11 ENCOUNTER FOR CHEMOTHERAPY MANAGEMENT: ICD-10-CM

## 2018-03-15 DIAGNOSIS — D70.1 CHEMOTHERAPY-INDUCED NEUTROPENIA: ICD-10-CM

## 2018-03-15 DIAGNOSIS — T45.1X5A CHEMOTHERAPY-INDUCED NEUTROPENIA: ICD-10-CM

## 2018-03-15 LAB
ALBUMIN SERPL BCP-MCNC: 4 G/DL
ALP SERPL-CCNC: 75 U/L
ALT SERPL W/O P-5'-P-CCNC: 15 U/L
ANION GAP SERPL CALC-SCNC: 13 MMOL/L
AST SERPL-CCNC: 21 U/L
BASOPHILS # BLD AUTO: 0.07 K/UL
BASOPHILS NFR BLD: 0.7 %
BILIRUB SERPL-MCNC: 0.6 MG/DL
BUN SERPL-MCNC: 18 MG/DL
CALCIUM SERPL-MCNC: 10.3 MG/DL
CHLORIDE SERPL-SCNC: 104 MMOL/L
CO2 SERPL-SCNC: 23 MMOL/L
CREAT SERPL-MCNC: 1.2 MG/DL
DIFFERENTIAL METHOD: ABNORMAL
EOSINOPHIL # BLD AUTO: 0.2 K/UL
EOSINOPHIL NFR BLD: 1.6 %
ERYTHROCYTE [DISTWIDTH] IN BLOOD BY AUTOMATED COUNT: 14.6 %
EST. GFR  (AFRICAN AMERICAN): >60 ML/MIN/1.73 M^2
EST. GFR  (NON AFRICAN AMERICAN): >60 ML/MIN/1.73 M^2
GLUCOSE SERPL-MCNC: 131 MG/DL
HCT VFR BLD AUTO: 36.9 %
HGB BLD-MCNC: 12.4 G/DL
IMM GRANULOCYTES # BLD AUTO: 0.1 K/UL
IMM GRANULOCYTES NFR BLD AUTO: 1 %
LYMPHOCYTES # BLD AUTO: 1.6 K/UL
LYMPHOCYTES NFR BLD: 16.1 %
MCH RBC QN AUTO: 28.2 PG
MCHC RBC AUTO-ENTMCNC: 33.6 G/DL
MCV RBC AUTO: 84 FL
MONOCYTES # BLD AUTO: 0.9 K/UL
MONOCYTES NFR BLD: 8.9 %
NEUTROPHILS # BLD AUTO: 7.1 K/UL
NEUTROPHILS NFR BLD: 71.7 %
NRBC BLD-RTO: 0 /100 WBC
PLATELET # BLD AUTO: 192 K/UL
PMV BLD AUTO: 9.6 FL
POTASSIUM SERPL-SCNC: 4.6 MMOL/L
PROT SERPL-MCNC: 7.3 G/DL
RBC # BLD AUTO: 4.4 M/UL
SODIUM SERPL-SCNC: 140 MMOL/L
WBC # BLD AUTO: 9.91 K/UL

## 2018-03-15 PROCEDURE — 96411 CHEMO IV PUSH ADDL DRUG: CPT

## 2018-03-15 PROCEDURE — 36415 COLL VENOUS BLD VENIPUNCTURE: CPT

## 2018-03-15 PROCEDURE — 99215 OFFICE O/P EST HI 40 MIN: CPT | Mod: PBBFAC,25 | Performed by: NURSE PRACTITIONER

## 2018-03-15 PROCEDURE — 63600175 PHARM REV CODE 636 W HCPCS: Performed by: INTERNAL MEDICINE

## 2018-03-15 PROCEDURE — 25000003 PHARM REV CODE 250: Performed by: INTERNAL MEDICINE

## 2018-03-15 PROCEDURE — 99999 PR PBB SHADOW E&M-EST. PATIENT-LVL V: CPT | Mod: PBBFAC,,, | Performed by: NURSE PRACTITIONER

## 2018-03-15 PROCEDURE — 96413 CHEMO IV INFUSION 1 HR: CPT

## 2018-03-15 PROCEDURE — 99213 OFFICE O/P EST LOW 20 MIN: CPT | Mod: S$PBB,,, | Performed by: NURSE PRACTITIONER

## 2018-03-15 PROCEDURE — 80053 COMPREHEN METABOLIC PANEL: CPT

## 2018-03-15 PROCEDURE — 96367 TX/PROPH/DG ADDL SEQ IV INF: CPT

## 2018-03-15 PROCEDURE — 85025 COMPLETE CBC W/AUTO DIFF WBC: CPT

## 2018-03-15 RX ORDER — SODIUM CHLORIDE 0.9 % (FLUSH) 0.9 %
10 SYRINGE (ML) INJECTION
Status: DISCONTINUED | OUTPATIENT
Start: 2018-03-15 | End: 2018-03-15 | Stop reason: HOSPADM

## 2018-03-15 RX ORDER — SODIUM CHLORIDE 0.9 % (FLUSH) 0.9 %
10 SYRINGE (ML) INJECTION
Status: CANCELLED | OUTPATIENT
Start: 2018-03-15

## 2018-03-15 RX ORDER — HEPARIN 100 UNIT/ML
500 SYRINGE INTRAVENOUS
Status: CANCELLED | OUTPATIENT
Start: 2018-03-15

## 2018-03-15 RX ADMIN — MESNA 1350 MG: 100 INJECTION, SOLUTION INTRAVENOUS at 10:03

## 2018-03-15 RX ADMIN — DEXAMETHASONE SODIUM PHOSPHATE: 4 INJECTION, SOLUTION INTRAMUSCULAR; INTRAVENOUS at 10:03

## 2018-03-15 RX ADMIN — SODIUM CHLORIDE 1350 MG: 9 INJECTION, SOLUTION INTRAVENOUS at 11:03

## 2018-03-15 NOTE — PLAN OF CARE
Problem: Patient Care Overview  Goal: Plan of Care Review  Outcome: Ongoing (interventions implemented as appropriate)  Patient tolerated mesna/cytoxan well today. NAD noted upon discharge. PIV removed, catheter tip intact. Verbalized understanding to call MD for any questions/concerns. AVS given. Discharged home, escorted in wheelchair by family.

## 2018-03-15 NOTE — PROGRESS NOTES
SECTION OF HEMATOLOGY AND BONE MARROW TRANSPLANT  New Patient Visit   03/16/2018  Referred by:  No ref. provider found  Referred for:     CHIEF COMPLAINT:   Chief Complaint   Patient presents with    Follow-up     chemo    Fatigue     Weakness and feeling tired       HISTORY OF PRESENT ILLNESS:   Presents for cycle #8  HD CTX for MS. He states he has been tolerating the previous cycles well. Patient appears pleasant but confused during appt. Mental status better after chemo, then starts to get confused again 1-2 weeks later per wife. No headaches, no diplopia. Wife reports he tires easily and stumbles a lot. Sleeps about 90% day. No nausea, vomiting, sometimes right after chemo has diarrhea. Eats well. No fevers or recent infections.    PAST MEDICAL HISTORY:   Past Medical History:   Diagnosis Date    CNS vasculitis 6/2/2017    Follows with Dr. Love By mri.  Several active lesions, many old. 5/13: MRA brain/neck, MRI brain w/wo contrast show no vascular occlusion, multiple foci of hyperintensity in deep cerebral periventricular white matter in pattern of demyelinating process.  5/17: MRI spine performed, no spinal cord lesions. Hospitalization 6/2017. EEG was performed negative for seizures.  Repeat MRI showing new lesion, question whether this was demyelination versus CVA. 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 Findings compatible with a small acute lacunar infarct adjacent to the left frontal horn.  Nonspecific enhancement just inferior to this region and extending into the left basal ganglia, as well as within the inferior aspect of the left cerebellum.  No evidence of a focal mass. 2.  Extensive increased signal intensity involving the periventricular white matter compatible with  "demyelinating disease versus vasculitis. 3.  Clinical correlation and followup recommended. 4.  This reportedly flattened in the EPIC and the corpus callosum medical record system.    Mixed hyperlipidemia 11/9/2012    NAFLD (nonalcoholic fatty liver disease) 10/11/2013    CHASE (nonalcoholic steatohepatitis)     Obesity     Stroke     Type 2 diabetes mellitus with diabetic polyneuropathy, with long-term current use of insulin 5/14/2017    Type 2 diabetes mellitus with hyperglycemia, with long-term current use of insulin 10/11/2013       PAST SURGICAL HISTORY:   Past Surgical History:   Procedure Laterality Date    SKIN CANCER EXCISION         PAST SOCIAL HISTORY:   reports that he has never smoked. He has never used smokeless tobacco. He reports that he does not drink alcohol or use drugs.    FAMILY HISTORY:  Family History   Problem Relation Age of Onset    Heart disease Mother     Hypertension Mother     Heart failure Mother     Cancer Father      lung    Diabetes Maternal Aunt        CURRENT MEDICATIONS:   Current Outpatient Prescriptions   Medication Sig    aspirin (ECOTRIN) 81 MG EC tablet Take 81 mg by mouth once daily.    atenolol (TENORMIN) 50 MG tablet Take 1 tablet (50 mg total) by mouth once daily.    atorvastatin (LIPITOR) 40 MG tablet Take 1 tablet (40 mg total) by mouth once daily.    blood sugar diagnostic Strp To check BG 4 times daily, to use with insurance preferred meter    blood-glucose meter kit To check BG 4 times daily, one touch verio meter. Dx code e11.65    diltiaZEM (DILACOR XR) 240 MG CDCR Take 1 capsule (240 mg total) by mouth once daily.    insulin glargine (BASAGLAR KWIKPEN U-100 INSULIN) 100 unit/mL (3 mL) InPn pen Inject 40 Units into the skin every evening.    insulin lispro (HUMALOG) 100 unit/mL injection Inject 20 units before meals    insulin needles, disposable, (BD ULTRA-FINE ANA PEN NEEDLES) 32 x 5/32 " Ndle Inject twice daily    insulin syringe-needle " "U-100 (INSULIN SYRINGE) 1/2 mL 30 gauge x 5/16 Syrg Use 3x/day    insulin syringe-needle U-100 0.5 mL 31 gauge x 5/16 Syrg     lancets Misc To check BG 4 times daily, one touch verio meter. Dx code e11.65    levETIRAcetam (KEPPRA) 500 MG Tab TAKE ONE TABLET BY MOUTH TWICE DAILY    liraglutide 0.6 mg/0.1 mL, 18 mg/3 mL, subq PNIJ (VICTOZA 3-TOMASZ) 0.6 mg/0.1 mL (18 mg/3 mL) PnIj Inject 1.8 mg into the skin once daily.    metformin (GLUCOPHAGE-XR) 500 MG 24 hr tablet Take 2 tablets (1,000 mg total) by mouth 2 (two) times daily with meals.    NOVOFINE PLUS 32 gauge x 1/6" Ndle     ONETOUCH DELICA LANCETS 33 gauge Misc     ranitidine (ZANTAC) 150 MG tablet Take 1 tablet (150 mg total) by mouth 2 (two) times daily.    sulfamethoxazole-trimethoprim 800-160mg (BACTRIM DS) 800-160 mg Tab Take every Monday, Wednesday, and Friday.  Infectious Disease will decide if this is needed in 3-4 months.    valsartan (DIOVAN) 320 MG tablet Take 1 tablet (320 mg total) by mouth once daily.    venlafaxine (EFFEXOR-XR) 75 MG 24 hr capsule Take 1 capsule (75 mg total) by mouth once daily.    vitamin D 1000 units Tab Take 5 tablets (5,000 Units total) by mouth once daily.    calcium carbonate (OS-DONNA) 500 mg calcium (1,250 mg) tablet Take 2 tablets (1,000 mg total) by mouth once.    omega-3 fatty acids-vitamin E (FISH OIL) 1,000 mg Cap Take 1 capsule by mouth 2 (two) times daily.     No current facility-administered medications for this visit.      ALLERGIES:   Review of patient's allergies indicates:  No Known Allergies          REVIEW OF SYSTEMS:   General ROS: Positive for fatigue.   Psychological ROS: Positive for confusion.   Ophthalmic ROS: negative  ENT ROS: negative  Allergy and Immunology ROS: negative  Hematological and Lymphatic ROS: negative  Endocrine ROS: negative  Respiratory ROS: negative  Cardiovascular ROS: negative  Gastrointestinal ROS: negative  Genito-Urinary ROS: negative  Musculoskeletal ROS: " negative  Neurological ROS: see HPI  Dermatological ROS: negative    PHYSICAL EXAM:   Vitals:    03/15/18 0852   BP: 135/87   Pulse: 93   Resp: 16   Temp: 97.7 °F (36.5 °C)       General - well developed, well nourished, no apparent distress  Head & Face - no sinus tenderness  Eyes - normal conjunctivae and lids   ENT - normal external auditory canals  Chest and Lung - normal respiratory effort, clear to auscultation bilaterally   Cardiovascular - RRR with no MGR, normal S1 and S2; no pedal edema  Abdomen -  soft, nontender, no palpable hepatomegaly or splenomegaly  Lymph - no palpable lymphadenopathy  Extremities - unremarkable nails and digits  Heme - no bruising, petechiae, pallor  Skin - no rashes or lesions  Psych - Depressed mood and affect. Confusion noted.      ECOG Performance Status: (foot note - ECOG PS provided by Eastern Cooperative Oncology Group) 1 - Symptomatic but completely ambulatory    Karnofsky Performance Score:  70%- Cares for Self: Unable to Carry on Normal Activity or Active Work  DATA:   Lab Results   Component Value Date    WBC 9.91 03/15/2018    HGB 12.4 (L) 03/15/2018    HCT 36.9 (L) 03/15/2018    MCV 84 03/15/2018     03/15/2018     Gran # (ANC)   Date Value Ref Range Status   03/15/2018 7.1 1.8 - 7.7 K/uL Final     Gran%   Date Value Ref Range Status   03/15/2018 71.7 38.0 - 73.0 % Final     CMP  Sodium   Date Value Ref Range Status   03/15/2018 140 136 - 145 mmol/L Final     Potassium   Date Value Ref Range Status   03/15/2018 4.6 3.5 - 5.1 mmol/L Final     Chloride   Date Value Ref Range Status   03/15/2018 104 95 - 110 mmol/L Final     CO2   Date Value Ref Range Status   03/15/2018 23 23 - 29 mmol/L Final     Glucose   Date Value Ref Range Status   03/15/2018 131 (H) 70 - 110 mg/dL Final     BUN, Bld   Date Value Ref Range Status   03/15/2018 18 6 - 20 mg/dL Final     Creatinine   Date Value Ref Range Status   03/15/2018 1.2 0.5 - 1.4 mg/dL Final     Calcium   Date Value Ref  Range Status   03/15/2018 10.3 8.7 - 10.5 mg/dL Final     Total Protein   Date Value Ref Range Status   03/15/2018 7.3 6.0 - 8.4 g/dL Final     Albumin   Date Value Ref Range Status   03/15/2018 4.0 3.5 - 5.2 g/dL Final   10/11/2013 4.3 3.6 - 5.1 g/dL Final     Comment:     @ Test Performed By:  [email or web address] Vera AMELIA Almazan M.D.,   [id number] [place] [id number]  [facility] #[id number]     Total Bilirubin   Date Value Ref Range Status   03/15/2018 0.6 0.1 - 1.0 mg/dL Final     Comment:     For infants and newborns, interpretation of results should be based  on gestational age, weight and in agreement with clinical  observations.  Premature Infant recommended reference ranges:  Up to 24 hours.............<8.0 mg/dL  Up to 48 hours............<12.0 mg/dL  3-5 days..................<15.0 mg/dL  6-29 days.................<15.0 mg/dL       Alkaline Phosphatase   Date Value Ref Range Status   03/15/2018 75 55 - 135 U/L Final     AST   Date Value Ref Range Status   03/15/2018 21 10 - 40 U/L Final     ALT   Date Value Ref Range Status   03/15/2018 15 10 - 44 U/L Final     Anion Gap   Date Value Ref Range Status   03/15/2018 13 8 - 16 mmol/L Final     eGFR if    Date Value Ref Range Status   03/15/2018 >60.0 >60 mL/min/1.73 m^2 Final     eGFR if non    Date Value Ref Range Status   03/15/2018 >60.0 >60 mL/min/1.73 m^2 Final     Comment:     Calculation used to obtain the estimated glomerular filtration  rate (eGFR) is the CKD-EPI equation.            ASSESSMENT AND PLAN:   Encounter Diagnoses   Name Primary?    CNS vasculitis Yes    Encounter for chemotherapy management      -proceed with HDCTX 600mg/m2 dose #8 with mesna support per CNS vasculitis, protocol per Dr. Love  -per neurology, plan for approximate 1 year of monthly therapy  -No contraindication to proceed with CTX today, wbc and platelet wnl, mild anemia noted. Creatinine  and liver enzymes wnl.      Follow Up:  Cbc, cmp, type and screen lab only in 2 weeks  -same labs, Nurse practioner appt, chair for cytoxan #9  infusion in 4 weeks     Carmel Montero NP  Hematology/Oncology/Bone Marrow Transplant

## 2018-03-20 ENCOUNTER — TELEPHONE (OUTPATIENT)
Dept: REHABILITATION | Facility: HOSPITAL | Age: 58
End: 2018-03-20

## 2018-03-26 ENCOUNTER — TELEPHONE (OUTPATIENT)
Dept: NEUROLOGY | Facility: CLINIC | Age: 58
End: 2018-03-26

## 2018-03-26 NOTE — TELEPHONE ENCOUNTER
----- Message from Ruby Staton sent at 3/26/2018  1:00 PM CDT -----  Contact: Pt wife Bekah Godfrey is requesting a callback from Charis regarding an appt.    Bekah can be reached at [phone]    Thanks

## 2018-03-27 DIAGNOSIS — I10 ESSENTIAL HYPERTENSION: Chronic | ICD-10-CM

## 2018-03-27 RX ORDER — DILTIAZEM HYDROCHLORIDE 240 MG/1
CAPSULE, EXTENDED RELEASE ORAL
Qty: 30 CAPSULE | Refills: 5 | Status: SHIPPED | OUTPATIENT
Start: 2018-03-27 | End: 2018-08-01 | Stop reason: SDUPTHER

## 2018-03-27 RX ORDER — ATORVASTATIN CALCIUM 40 MG/1
TABLET, FILM COATED ORAL
Qty: 30 TABLET | Refills: 5 | Status: SHIPPED | OUTPATIENT
Start: 2018-03-27 | End: 2018-08-01 | Stop reason: SDUPTHER

## 2018-03-27 NOTE — TELEPHONE ENCOUNTER
----- Message from Feli Stafford sent at 3/26/2018  4:41 PM CDT -----  Contact: Lisseth(wife) @ [phone]  Calling to speak with Charis regarding patient's insurance. Please call.

## 2018-03-27 NOTE — TELEPHONE ENCOUNTER
Call returned to Bekah. She states she would like a sooner appt with this office to discuss his confusion and whether continuing the prednisone will help that. Appt rescheduled from 4/26 with Beti to 4/4 at 11:30 with Beti. Aware of all.

## 2018-03-28 ENCOUNTER — TELEPHONE (OUTPATIENT)
Dept: PSYCHIATRY | Facility: CLINIC | Age: 58
End: 2018-03-28

## 2018-03-28 NOTE — TELEPHONE ENCOUNTER
Phoned pt's wife to check in as family cancelled pt's follow up with this writer at the beginning of March.  Wife advised that pt does not have insurance, but it will be active April 1, 2018.  She will call to reschedule appointments with all providers, soon.

## 2018-04-01 ENCOUNTER — HOSPITAL ENCOUNTER (OUTPATIENT)
Facility: HOSPITAL | Age: 58
Discharge: HOME | End: 2018-04-03
Attending: SURGERY | Admitting: INTERNAL MEDICINE
Payer: COMMERCIAL

## 2018-04-01 DIAGNOSIS — R55 SYNCOPE, UNSPECIFIED SYNCOPE TYPE: Primary | ICD-10-CM

## 2018-04-01 DIAGNOSIS — I25.10 CARDIOVASCULAR DISEASE: ICD-10-CM

## 2018-04-01 DIAGNOSIS — R53.1 WEAKNESS: ICD-10-CM

## 2018-04-01 DIAGNOSIS — I77.6 CNS VASCULITIS: ICD-10-CM

## 2018-04-01 DIAGNOSIS — F32.5 MAJOR DEPRESSIVE DISORDER WITH SINGLE EPISODE, IN FULL REMISSION: Chronic | ICD-10-CM

## 2018-04-01 DIAGNOSIS — G93.40 ENCEPHALOPATHY: ICD-10-CM

## 2018-04-01 LAB
ALBUMIN SERPL BCP-MCNC: 3.8 G/DL
ALP SERPL-CCNC: 73 U/L
ALT SERPL W/O P-5'-P-CCNC: 16 U/L
ANION GAP SERPL CALC-SCNC: 11 MMOL/L
ANISOCYTOSIS BLD QL SMEAR: SLIGHT
AST SERPL-CCNC: 18 U/L
BASOPHILS NFR BLD: 1 %
BILIRUB SERPL-MCNC: 0.8 MG/DL
BNP SERPL-MCNC: 48 PG/ML
BUN SERPL-MCNC: 13 MG/DL
CALCIUM SERPL-MCNC: 9.5 MG/DL
CHLORIDE SERPL-SCNC: 107 MMOL/L
CK MB SERPL-MCNC: 1.2 NG/ML
CK MB SERPL-RTO: 2.9 %
CK SERPL-CCNC: 42 U/L
CK SERPL-CCNC: 42 U/L
CO2 SERPL-SCNC: 23 MMOL/L
CREAT SERPL-MCNC: 1 MG/DL
DIFFERENTIAL METHOD: ABNORMAL
EOSINOPHIL NFR BLD: 7 %
ERYTHROCYTE [DISTWIDTH] IN BLOOD BY AUTOMATED COUNT: 14.9 %
EST. GFR  (AFRICAN AMERICAN): >60 ML/MIN/1.73 M^2
EST. GFR  (NON AFRICAN AMERICAN): >60 ML/MIN/1.73 M^2
GLUCOSE SERPL-MCNC: 162 MG/DL
HCT VFR BLD AUTO: 32.2 %
HGB BLD-MCNC: 11.1 G/DL
LYMPHOCYTES NFR BLD: 36 %
MAGNESIUM SERPL-MCNC: 1.9 MG/DL
MCH RBC QN AUTO: 28.5 PG
MCHC RBC AUTO-ENTMCNC: 34.5 G/DL
MCV RBC AUTO: 83 FL
METAMYELOCYTES NFR BLD MANUAL: 1 %
MONOCYTES NFR BLD: 25 %
NEUTROPHILS NFR BLD: 28 %
NEUTS BAND NFR BLD MANUAL: 2 %
OVALOCYTES BLD QL SMEAR: ABNORMAL
PHOSPHATE SERPL-MCNC: 4.1 MG/DL
PLATELET # BLD AUTO: 227 K/UL
PLATELET BLD QL SMEAR: ABNORMAL
PMV BLD AUTO: 8.6 FL
POCT GLUCOSE: 140 MG/DL (ref 70–110)
POLYCHROMASIA BLD QL SMEAR: ABNORMAL
POTASSIUM SERPL-SCNC: 3.7 MMOL/L
PROT SERPL-MCNC: 6.8 G/DL
RBC # BLD AUTO: 3.9 M/UL
SCHISTOCYTES BLD QL SMEAR: ABNORMAL
SODIUM SERPL-SCNC: 141 MMOL/L
SPHEROCYTES BLD QL SMEAR: ABNORMAL
TROPONIN I SERPL DL<=0.01 NG/ML-MCNC: 0.01 NG/ML
TSH SERPL DL<=0.005 MIU/L-ACNC: 1.39 UIU/ML
WBC # BLD AUTO: 2.01 K/UL

## 2018-04-01 PROCEDURE — 84484 ASSAY OF TROPONIN QUANT: CPT

## 2018-04-01 PROCEDURE — 83735 ASSAY OF MAGNESIUM: CPT

## 2018-04-01 PROCEDURE — 84443 ASSAY THYROID STIM HORMONE: CPT

## 2018-04-01 PROCEDURE — 36415 COLL VENOUS BLD VENIPUNCTURE: CPT

## 2018-04-01 PROCEDURE — 25000003 PHARM REV CODE 250: Performed by: SURGERY

## 2018-04-01 PROCEDURE — 82550 ASSAY OF CK (CPK): CPT

## 2018-04-01 PROCEDURE — 82553 CREATINE MB FRACTION: CPT

## 2018-04-01 PROCEDURE — 83880 ASSAY OF NATRIURETIC PEPTIDE: CPT

## 2018-04-01 PROCEDURE — 96361 HYDRATE IV INFUSION ADD-ON: CPT

## 2018-04-01 PROCEDURE — 85007 BL SMEAR W/DIFF WBC COUNT: CPT

## 2018-04-01 PROCEDURE — 94760 N-INVAS EAR/PLS OXIMETRY 1: CPT

## 2018-04-01 PROCEDURE — 93005 ELECTROCARDIOGRAM TRACING: CPT

## 2018-04-01 PROCEDURE — 83036 HEMOGLOBIN GLYCOSYLATED A1C: CPT

## 2018-04-01 PROCEDURE — 96360 HYDRATION IV INFUSION INIT: CPT

## 2018-04-01 PROCEDURE — 80177 DRUG SCRN QUAN LEVETIRACETAM: CPT

## 2018-04-01 PROCEDURE — G0378 HOSPITAL OBSERVATION PER HR: HCPCS

## 2018-04-01 PROCEDURE — 99285 EMERGENCY DEPT VISIT HI MDM: CPT | Mod: 25

## 2018-04-01 PROCEDURE — 85027 COMPLETE CBC AUTOMATED: CPT

## 2018-04-01 PROCEDURE — 84100 ASSAY OF PHOSPHORUS: CPT

## 2018-04-01 PROCEDURE — 93010 ELECTROCARDIOGRAM REPORT: CPT | Mod: ,,, | Performed by: INTERNAL MEDICINE

## 2018-04-01 PROCEDURE — 80053 COMPREHEN METABOLIC PANEL: CPT

## 2018-04-01 RX ORDER — ATENOLOL 25 MG/1
50 TABLET ORAL DAILY
Status: DISCONTINUED | OUTPATIENT
Start: 2018-04-02 | End: 2018-04-03 | Stop reason: HOSPADM

## 2018-04-01 RX ORDER — VENLAFAXINE HYDROCHLORIDE 37.5 MG/1
75 CAPSULE, EXTENDED RELEASE ORAL DAILY
Status: DISCONTINUED | OUTPATIENT
Start: 2018-04-02 | End: 2018-04-03 | Stop reason: HOSPADM

## 2018-04-01 RX ORDER — PANTOPRAZOLE SODIUM 40 MG/1
40 TABLET, DELAYED RELEASE ORAL DAILY
Status: DISCONTINUED | OUTPATIENT
Start: 2018-04-02 | End: 2018-04-03 | Stop reason: HOSPADM

## 2018-04-01 RX ORDER — INSULIN ASPART 100 [IU]/ML
7 INJECTION, SOLUTION INTRAVENOUS; SUBCUTANEOUS
Status: DISCONTINUED | OUTPATIENT
Start: 2018-04-02 | End: 2018-04-03 | Stop reason: HOSPADM

## 2018-04-01 RX ORDER — ASPIRIN 81 MG/1
81 TABLET ORAL DAILY
Status: DISCONTINUED | OUTPATIENT
Start: 2018-04-02 | End: 2018-04-03 | Stop reason: HOSPADM

## 2018-04-01 RX ORDER — IBUPROFEN 200 MG
16 TABLET ORAL
Status: DISCONTINUED | OUTPATIENT
Start: 2018-04-01 | End: 2018-04-03 | Stop reason: SDUPTHER

## 2018-04-01 RX ORDER — ACETAMINOPHEN 325 MG/1
650 TABLET ORAL EVERY 8 HOURS PRN
Status: DISCONTINUED | OUTPATIENT
Start: 2018-04-01 | End: 2018-04-03 | Stop reason: HOSPADM

## 2018-04-01 RX ORDER — INSULIN ASPART 100 [IU]/ML
1-10 INJECTION, SOLUTION INTRAVENOUS; SUBCUTANEOUS
Status: DISCONTINUED | OUTPATIENT
Start: 2018-04-01 | End: 2018-04-03 | Stop reason: HOSPADM

## 2018-04-01 RX ORDER — IBUPROFEN 200 MG
24 TABLET ORAL
Status: DISCONTINUED | OUTPATIENT
Start: 2018-04-01 | End: 2018-04-03 | Stop reason: SDUPTHER

## 2018-04-01 RX ORDER — ATORVASTATIN CALCIUM 40 MG/1
40 TABLET, FILM COATED ORAL DAILY
Status: DISCONTINUED | OUTPATIENT
Start: 2018-04-02 | End: 2018-04-03 | Stop reason: HOSPADM

## 2018-04-01 RX ORDER — GLUCAGON 1 MG
1 KIT INJECTION
Status: DISCONTINUED | OUTPATIENT
Start: 2018-04-01 | End: 2018-04-03 | Stop reason: HOSPADM

## 2018-04-01 RX ORDER — IBUPROFEN 200 MG
16 TABLET ORAL
Status: DISCONTINUED | OUTPATIENT
Start: 2018-04-01 | End: 2018-04-03 | Stop reason: HOSPADM

## 2018-04-01 RX ORDER — SODIUM CHLORIDE 9 MG/ML
INJECTION, SOLUTION INTRAVENOUS CONTINUOUS
Status: DISCONTINUED | OUTPATIENT
Start: 2018-04-01 | End: 2018-04-02

## 2018-04-01 RX ORDER — ONDANSETRON 2 MG/ML
4 INJECTION INTRAMUSCULAR; INTRAVENOUS EVERY 8 HOURS PRN
Status: DISCONTINUED | OUTPATIENT
Start: 2018-04-01 | End: 2018-04-03 | Stop reason: HOSPADM

## 2018-04-01 RX ORDER — VALSARTAN 80 MG/1
320 TABLET ORAL DAILY
Status: DISCONTINUED | OUTPATIENT
Start: 2018-04-02 | End: 2018-04-03 | Stop reason: HOSPADM

## 2018-04-01 RX ORDER — LEVETIRACETAM 500 MG/1
500 TABLET ORAL 2 TIMES DAILY
Status: DISCONTINUED | OUTPATIENT
Start: 2018-04-01 | End: 2018-04-03 | Stop reason: HOSPADM

## 2018-04-01 RX ORDER — IBUPROFEN 200 MG
24 TABLET ORAL
Status: DISCONTINUED | OUTPATIENT
Start: 2018-04-01 | End: 2018-04-03 | Stop reason: HOSPADM

## 2018-04-01 RX ORDER — DILTIAZEM HYDROCHLORIDE 120 MG/1
240 CAPSULE, COATED, EXTENDED RELEASE ORAL DAILY
Status: DISCONTINUED | OUTPATIENT
Start: 2018-04-02 | End: 2018-04-03 | Stop reason: HOSPADM

## 2018-04-01 RX ADMIN — SODIUM CHLORIDE: 0.9 INJECTION, SOLUTION INTRAVENOUS at 09:04

## 2018-04-01 RX ADMIN — SODIUM CHLORIDE 500 ML: 0.9 INJECTION, SOLUTION INTRAVENOUS at 06:04

## 2018-04-01 NOTE — ED TRIAGE NOTES
Enters er per aasi.  His son had woke pt up from an afternoon nap and the pt stated he had a moment of dizziness.  No confusion noted upon arrival to the er.  No chest pain, n/v or sob.

## 2018-04-02 ENCOUNTER — TELEPHONE (OUTPATIENT)
Dept: NEUROLOGY | Facility: CLINIC | Age: 58
End: 2018-04-02

## 2018-04-02 PROBLEM — R40.4 TRANSIENT ALTERATION OF AWARENESS: Status: ACTIVE | Noted: 2018-04-01

## 2018-04-02 LAB
ALBUMIN SERPL BCP-MCNC: 3.3 G/DL
ALP SERPL-CCNC: 64 U/L
ALT SERPL W/O P-5'-P-CCNC: 14 U/L
ANION GAP SERPL CALC-SCNC: 9 MMOL/L
AST SERPL-CCNC: 17 U/L
BASOPHILS # BLD AUTO: 0.03 K/UL
BASOPHILS NFR BLD: 1.3 %
BILIRUB SERPL-MCNC: 0.5 MG/DL
BILIRUB UR QL STRIP: NEGATIVE
BUN SERPL-MCNC: 11 MG/DL
CALCIUM SERPL-MCNC: 8.9 MG/DL
CHLORIDE SERPL-SCNC: 113 MMOL/L
CLARITY UR: CLEAR
CO2 SERPL-SCNC: 22 MMOL/L
COLOR UR: YELLOW
CREAT SERPL-MCNC: 0.9 MG/DL
DIFFERENTIAL METHOD: ABNORMAL
EOSINOPHIL # BLD AUTO: 0.2 K/UL
EOSINOPHIL NFR BLD: 6.7 %
ERYTHROCYTE [DISTWIDTH] IN BLOOD BY AUTOMATED COUNT: 14.9 %
EST. GFR  (AFRICAN AMERICAN): >60 ML/MIN/1.73 M^2
EST. GFR  (NON AFRICAN AMERICAN): >60 ML/MIN/1.73 M^2
ESTIMATED AVG GLUCOSE: 117 MG/DL
GLUCOSE SERPL-MCNC: 150 MG/DL
GLUCOSE UR QL STRIP: NEGATIVE
HBA1C MFR BLD HPLC: 5.7 %
HCT VFR BLD AUTO: 29.9 %
HGB BLD-MCNC: 10.1 G/DL
HGB UR QL STRIP: ABNORMAL
KETONES UR QL STRIP: NEGATIVE
LEUKOCYTE ESTERASE UR QL STRIP: NEGATIVE
LYMPHOCYTES # BLD AUTO: 0.9 K/UL
LYMPHOCYTES NFR BLD: 37.7 %
MCH RBC QN AUTO: 28.1 PG
MCHC RBC AUTO-ENTMCNC: 33.8 G/DL
MCV RBC AUTO: 83 FL
MONOCYTES # BLD AUTO: 0.7 K/UL
MONOCYTES NFR BLD: 28 %
NEUTROPHILS # BLD AUTO: 0.6 K/UL
NEUTROPHILS NFR BLD: 27.6 %
NITRITE UR QL STRIP: NEGATIVE
PH UR STRIP: 5 [PH] (ref 5–8)
PLATELET # BLD AUTO: 212 K/UL
PMV BLD AUTO: 9.1 FL
POCT GLUCOSE: 152 MG/DL (ref 70–110)
POCT GLUCOSE: 190 MG/DL (ref 70–110)
POCT GLUCOSE: 201 MG/DL (ref 70–110)
POCT GLUCOSE: 226 MG/DL (ref 70–110)
POTASSIUM SERPL-SCNC: 3.4 MMOL/L
PROT SERPL-MCNC: 5.9 G/DL
PROT UR QL STRIP: ABNORMAL
RBC # BLD AUTO: 3.59 M/UL
SODIUM SERPL-SCNC: 144 MMOL/L
SP GR UR STRIP: 1.02 (ref 1–1.03)
TROPONIN I SERPL DL<=0.01 NG/ML-MCNC: 0.01 NG/ML
TROPONIN I SERPL DL<=0.01 NG/ML-MCNC: 0.02 NG/ML
TROPONIN I SERPL DL<=0.01 NG/ML-MCNC: 0.02 NG/ML
URN SPEC COLLECT METH UR: ABNORMAL
UROBILINOGEN UR STRIP-ACNC: NEGATIVE EU/DL
WBC # BLD AUTO: 2.39 K/UL

## 2018-04-02 PROCEDURE — 99220 PR INITIAL OBSERVATION CARE,LEVL III: CPT | Mod: ,,, | Performed by: INTERNAL MEDICINE

## 2018-04-02 PROCEDURE — 94761 N-INVAS EAR/PLS OXIMETRY MLT: CPT

## 2018-04-02 PROCEDURE — 84484 ASSAY OF TROPONIN QUANT: CPT

## 2018-04-02 PROCEDURE — 25500020 PHARM REV CODE 255: Performed by: INTERNAL MEDICINE

## 2018-04-02 PROCEDURE — 81003 URINALYSIS AUTO W/O SCOPE: CPT

## 2018-04-02 PROCEDURE — 96372 THER/PROPH/DIAG INJ SC/IM: CPT

## 2018-04-02 PROCEDURE — 25000003 PHARM REV CODE 250: Performed by: INTERNAL MEDICINE

## 2018-04-02 PROCEDURE — 63600175 PHARM REV CODE 636 W HCPCS: Performed by: INTERNAL MEDICINE

## 2018-04-02 PROCEDURE — 85025 COMPLETE CBC W/AUTO DIFF WBC: CPT

## 2018-04-02 PROCEDURE — G0378 HOSPITAL OBSERVATION PER HR: HCPCS

## 2018-04-02 PROCEDURE — A9585 GADOBUTROL INJECTION: HCPCS | Performed by: INTERNAL MEDICINE

## 2018-04-02 PROCEDURE — 99215 OFFICE O/P EST HI 40 MIN: CPT | Mod: ,,, | Performed by: PSYCHIATRY & NEUROLOGY

## 2018-04-02 PROCEDURE — 36415 COLL VENOUS BLD VENIPUNCTURE: CPT

## 2018-04-02 PROCEDURE — 84484 ASSAY OF TROPONIN QUANT: CPT | Mod: 91

## 2018-04-02 PROCEDURE — 93005 ELECTROCARDIOGRAM TRACING: CPT

## 2018-04-02 PROCEDURE — 96361 HYDRATE IV INFUSION ADD-ON: CPT

## 2018-04-02 PROCEDURE — 80053 COMPREHEN METABOLIC PANEL: CPT

## 2018-04-02 PROCEDURE — 63600175 PHARM REV CODE 636 W HCPCS: Performed by: SURGERY

## 2018-04-02 PROCEDURE — 25000003 PHARM REV CODE 250: Performed by: SURGERY

## 2018-04-02 RX ORDER — GADOBUTROL 604.72 MG/ML
10 INJECTION INTRAVENOUS
Status: COMPLETED | OUTPATIENT
Start: 2018-04-02 | End: 2018-04-02

## 2018-04-02 RX ADMIN — INSULIN ASPART 4 UNITS: 100 INJECTION, SOLUTION INTRAVENOUS; SUBCUTANEOUS at 04:04

## 2018-04-02 RX ADMIN — DILTIAZEM HYDROCHLORIDE 240 MG: 120 CAPSULE, EXTENDED RELEASE ORAL at 08:04

## 2018-04-02 RX ADMIN — ATENOLOL 50 MG: 25 TABLET ORAL at 08:04

## 2018-04-02 RX ADMIN — INSULIN ASPART 7 UNITS: 100 INJECTION, SOLUTION INTRAVENOUS; SUBCUTANEOUS at 08:04

## 2018-04-02 RX ADMIN — LEVETIRACETAM 500 MG: 500 TABLET, FILM COATED ORAL at 10:04

## 2018-04-02 RX ADMIN — ATORVASTATIN CALCIUM 40 MG: 40 TABLET, FILM COATED ORAL at 08:04

## 2018-04-02 RX ADMIN — INSULIN ASPART 7 UNITS: 100 INJECTION, SOLUTION INTRAVENOUS; SUBCUTANEOUS at 04:04

## 2018-04-02 RX ADMIN — INSULIN ASPART 1 UNITS: 100 INJECTION, SOLUTION INTRAVENOUS; SUBCUTANEOUS at 10:04

## 2018-04-02 RX ADMIN — VALSARTAN 320 MG: 80 TABLET, FILM COATED ORAL at 08:04

## 2018-04-02 RX ADMIN — VENLAFAXINE HYDROCHLORIDE 75 MG: 37.5 CAPSULE, EXTENDED RELEASE ORAL at 08:04

## 2018-04-02 RX ADMIN — LEVETIRACETAM 500 MG: 500 TABLET, FILM COATED ORAL at 08:04

## 2018-04-02 RX ADMIN — GADOBUTROL 10 ML: 604.72 INJECTION INTRAVENOUS at 01:04

## 2018-04-02 RX ADMIN — PANTOPRAZOLE SODIUM 40 MG: 40 TABLET, DELAYED RELEASE ORAL at 08:04

## 2018-04-02 RX ADMIN — INSULIN ASPART 7 UNITS: 100 INJECTION, SOLUTION INTRAVENOUS; SUBCUTANEOUS at 11:04

## 2018-04-02 RX ADMIN — ASPIRIN 81 MG: 81 TABLET, COATED ORAL at 08:04

## 2018-04-02 RX ADMIN — INSULIN ASPART 4 UNITS: 100 INJECTION, SOLUTION INTRAVENOUS; SUBCUTANEOUS at 11:04

## 2018-04-02 RX ADMIN — INSULIN DETEMIR 30 UNITS: 100 INJECTION, SOLUTION SUBCUTANEOUS at 10:04

## 2018-04-02 RX ADMIN — SODIUM CHLORIDE: 0.9 INJECTION, SOLUTION INTRAVENOUS at 05:04

## 2018-04-02 NOTE — PLAN OF CARE
Problem: Patient Care Overview  Goal: Plan of Care Review  Outcome: Ongoing (interventions implemented as appropriate)  Patient had a good night. Slept well. No neuro changes or telemetry changes, Sr. NS infusing at 125 cc/hr. POC reviewed and patient instructed to call for needs/asst.

## 2018-04-02 NOTE — TELEPHONE ENCOUNTER
----- Message from Shadi Jose sent at 4/2/2018 12:32 PM CDT -----  Contact: Bekah (Wife) [phone]  Bekah called to speak to someone regarding the patient. He's been hospitalized as of last night. Please contact her to discuss further.

## 2018-04-02 NOTE — H&P
[facility] Medicine  History & Physical    Patient Name: Bessy Nunez  MRN: [id number]  Admission Date: 4/1/2018  Attending Physician: Ivett Orr MD   Primary Care Provider: Edgar Nova MD         Patient information was obtained from relative(s) and ER records.     Subjective:     Principal Problem:Transient alteration of awareness    Chief Complaint:   Chief Complaint   Patient presents with    Dizziness        HPI: Pt presented to ER yesterday with c/o weakness and dizziness. He is not very reliable for hx. His son is at bedside. He reports that he woke the patient up yesterday and he was more confused that his baseline. He reports that the patient was on prednisone for vasculitis. Unsure how long he has been off. He reports that the pharmacy was unable to get a refill from neurology. He is confused at baseline, yesterday was more than his usual. This am he is better and back to his baseline since he had run out of steroids. He denies any LOC. Patient can not remeber how he was feeling to tell us if he is better. Son reports that he was not eating much.     Has a significant hx of vasculitis/stroke. [place] [facility]/onc which has been treating him since his last admission.     Past Medical History:   Diagnosis Date    CNS vasculitis 6/2/2017    Follows with Dr. Love By mri.  Several active lesions, many old. 5/13: MRA brain/neck, MRI brain w/wo contrast show no vascular occlusion, multiple foci of hyperintensity in deep cerebral periventricular white matter in pattern of demyelinating process.  5/17: MRI spine performed, no spinal cord lesions. Hospitalization 6/2017. EEG was performed negative for seizures.  Repeat MRI showing new lesion, question whether this was demyelination versus CVA. Cytoxan 6/3/17 & 8/14/17    Depressive disorder, not elsewhere classified 11/9/2012    Essential hypertension 11/9/2012    Internuclear ophthalmoplegia of left  eye 5/13/2017    Lacunar stroke of left subthalamic region 9/14/2017 9/14/2017 MRI brain: 1. Findings compatible with a small acute lacunar infarct adjacent to the left frontal horn.  Nonspecific enhancement just inferior to this region and extending into the left basal ganglia, as well as within the inferior aspect of the left cerebellum.  No evidence of a focal mass. 2.  Extensive increased signal intensity involving the periventricular white matter compatible with demyelinating disease versus vasculitis. 3.  Clinical correlation and followup recommended. 4.  This reportedly flattened in the EPIC and the corpus callosum medical record system.    Mixed hyperlipidemia 11/9/2012    NAFLD (nonalcoholic fatty liver disease) 10/11/2013    CHASE (nonalcoholic steatohepatitis)     Obesity     Stroke     Type 2 diabetes mellitus with diabetic polyneuropathy, with long-term current use of insulin 5/14/2017    Type 2 diabetes mellitus with hyperglycemia, with long-term current use of insulin 10/11/2013       Past Surgical History:   Procedure Laterality Date    SKIN CANCER EXCISION         Review of patient's allergies indicates:  No Known Allergies    No current facility-administered medications on file prior to encounter.      Current Outpatient Prescriptions on File Prior to Encounter   Medication Sig    aspirin (ECOTRIN) 81 MG EC tablet Take 81 mg by mouth once daily.    atenolol (TENORMIN) 50 MG tablet Take 1 tablet (50 mg total) by mouth once daily.    atorvastatin (LIPITOR) 40 MG tablet TAKE ONE TABLET BY MOUTH ONCE DAILY    blood sugar diagnostic Strp To check BG 4 times daily, to use with insurance preferred meter    blood-glucose meter kit To check BG 4 times daily, one touch verio meter. Dx code e11.65    calcium carbonate (OS-DONNA) 500 mg calcium (1,250 mg) tablet Take 2 tablets (1,000 mg total) by mouth once.    diltiaZEM (DILACOR XR) 240 MG CDCR TAKE ONE CAPSULE BY MOUTH ONCE DAILY    insulin  "glargine (BASAGLAR KWIKPEN U-100 INSULIN) 100 unit/mL (3 mL) InPn pen Inject 40 Units into the skin every evening.    insulin lispro (HUMALOG) 100 unit/mL injection Inject 20 units before meals    insulin needles, disposable, (BD ULTRA-FINE ANA PEN NEEDLES) 32 x 5/32 " Ndle Inject twice daily    insulin syringe-needle U-100 (INSULIN SYRINGE) 1/2 mL 30 gauge x 5/16 Syrg Use 3x/day    insulin syringe-needle U-100 0.5 mL 31 gauge x 5/16 Syrg     lancets Misc To check BG 4 times daily, one touch verio meter. Dx code e11.65    levETIRAcetam (KEPPRA) 500 MG Tab TAKE ONE TABLET BY MOUTH TWICE DAILY    liraglutide 0.6 mg/0.1 mL, 18 mg/3 mL, subq PNIJ (VICTOZA 3-TOMASZ) 0.6 mg/0.1 mL (18 mg/3 mL) PnIj Inject 1.8 mg into the skin once daily.    metformin (GLUCOPHAGE-XR) 500 MG 24 hr tablet Take 2 tablets (1,000 mg total) by mouth 2 (two) times daily with meals.    NOVOFINE PLUS 32 gauge x 1/6" Ndle     omega-3 fatty acids-vitamin E (FISH OIL) 1,000 mg Cap Take 1 capsule by mouth 2 (two) times daily.    ONETOUCH DELICA LANCETS 33 gauge Misc     ranitidine (ZANTAC) 150 MG tablet Take 1 tablet (150 mg total) by mouth 2 (two) times daily.    sulfamethoxazole-trimethoprim 800-160mg (BACTRIM DS) 800-160 mg Tab Take every Monday, Wednesday, and Friday.  Infectious Disease will decide if this is needed in 3-4 months.    valsartan (DIOVAN) 320 MG tablet Take 1 tablet (320 mg total) by mouth once daily.    venlafaxine (EFFEXOR-XR) 75 MG 24 hr capsule Take 1 capsule (75 mg total) by mouth once daily.    vitamin D 1000 units Tab Take 5 tablets (5,000 Units total) by mouth once daily.     Family History     Problem Relation (Age of Onset)    Cancer Father    Diabetes Maternal Aunt    Heart disease Mother    Heart failure Mother    Hypertension Mother        Social History Main Topics    Smoking status: Never Smoker    Smokeless tobacco: Never Used    Alcohol use No    Drug use: No    Sexual activity: Not on file "     Review of Systems   Unable to perform ROS: Other   Patient unreliable historian and unable to provide accurate review of systems due to chronic confusion.     Objective:     Vital Signs (Most Recent):  Temp: 96.1 °F (35.6 °C) (04/02/18 0740)  Pulse: 98 (04/02/18 0848)  Resp: 18 (04/02/18 0740)  BP: (!) 160/82 (04/02/18 0740)  SpO2: 96 % (04/02/18 0740) Vital Signs (24h Range):  Temp:  [96.1 °F (35.6 °C)-98 °F (36.7 °C)] 96.1 °F (35.6 °C)  Pulse:  [76-99] 98  Resp:  [16-20] 18  SpO2:  [95 %-100 %] 96 %  BP: (139-171)/(68-94) 160/82     Weight: 102.9 kg (226 lb 13.7 oz)  Body mass index is 32.55 kg/m².    Physical Exam   Constitutional: He appears well-developed and well-nourished. No distress.   HENT:   Head: Normocephalic and atraumatic.   Right Ear: External ear normal.   Left Ear: External ear normal.   Eyes: Conjunctivae and EOM are normal. Pupils are equal, round, and reactive to light. Right eye exhibits no discharge. Left eye exhibits no discharge.   Neck: Neck supple. No tracheal deviation present.   Cardiovascular: Normal rate and regular rhythm.  Exam reveals no gallop and no friction rub.    No murmur heard.  Pulmonary/Chest: Effort normal and breath sounds normal. No respiratory distress. He has no wheezes. He has no rales.   Abdominal: Soft. Bowel sounds are normal. He exhibits no distension. There is no tenderness. There is no rebound and no guarding.   Musculoskeletal: He exhibits no edema.   Neurological: He is alert. No cranial nerve deficit.   Oriented to person and place  Per son, he seems to be at his baseline status though son does not he was much better on when on steroids the confusion that he had yesterday is resolved   Skin: Skin is warm and dry.   Psychiatric: He has a normal mood and affect. His behavior is normal.   Nursing note and vitals reviewed.        CRANIAL NERVES     CN III, IV, VI   Pupils are equal, round, and reactive to light.  Extraocular motions are normal.         Significant Labs:   CBC:   Recent Labs  Lab 04/01/18 1825 04/02/18  0549   WBC 2.01* 2.39*   HGB 11.1* 10.1*   HCT 32.2* 29.9*    212     CMP:   Recent Labs  Lab 04/01/18 1825 04/02/18  0549    144   K 3.7 3.4*    113*   CO2 23 22*   * 150*   BUN 13 11   CREATININE 1.0 0.9   CALCIUM 9.5 8.9   PROT 6.8 5.9*   ALBUMIN 3.8 3.3*   BILITOT 0.8 0.5   ALKPHOS 73 64   AST 18 17   ALT 16 14   ANIONGAP 11 9   EGFRNONAA >60 >60     Recent Labs  Lab 04/01/18 1825 04/02/18  0549   CPK 42  42  --   --    CPKMB 1.2  --   --    TROPONINI 0.013  < > 0.018   MB 2.9  --   --    < > = values in this interval not displayed.     Lab Results   Component Value Date    TSH 1.385 04/01/2018     Mag 1.9    A1C 5.7   phos 4.1  BNP 48    keppra level in process    Urinalysis    Recent Labs  Lab 04/02/18  0828   COLORU Yellow   SPECGRAV 1.020   PHUR 5.0   PROTEINUA Trace*   NITRITE Negative   LEUKOCYTESUR Negative   UROBILINOGEN Negative       Cardiac Markers:   Recent Labs  Lab 04/01/18  1825   BNP 48       Significant Imaging:     CXR The lungs are clear, with normal appearance of pulmonary vasculature and no pleural effusion or pneumothorax.    The cardiac silhouette is normal in size. The hilar and mediastinal contours are unremarkable.    Bones are intact.    Ct head Intracranial compartment:    Ventricles and sulci are normal in size for age without evidence of hydrocephalus. No extra-axial blood or fluid collections.    Chronic infarct identified LEFT cerebellar hemisphere with lacunar infarct LEFT globus pallidus region.  No CT findings of acute large territorial infarct.  Periventricular low attenuation consistent with chronic microvascular ischemic change.  No parenchymal mass, hemorrhage, edema or major vascular distribution infarct. If there is additional concern for acute ischemia, MRI with diffusion-weighted imaging could be obtained.    Atherosclerotic change at level of the  carotids.    Skull/extracranial contents (limited evaluation): No fracture. Mastoid air cells and paranasal sinuses are essentially clear.    4/1 ekg Normal sinus rhythm  Moderate voltage criteria for LVH, may be normal variant  Nonspecific T wave abnormality  Abnormal ECG  When compared with ECG of 20-NOV-2017 14:50,  No significant change was found    4/2 ekg Normal sinus rhythm  Moderate voltage criteria for LVH, may be normal variant  Inferior infarct ,age undetermined  T wave abnormality, consider lateral ischemia  Abnormal ECG  When compared with ECG of 01-APR-2018 18:28,  Inferior infarct is now Present  Inverted T waves have replaced nonspecific T wave abnormality in Lateral leads    Assessment/Plan:     * Transient alteration of awareness    Woke up from nap disoriented--NO SYNCOPE reported by son who witnessed the event  Per son back to baseline now  Blood sugar was normal at that time  Checking keppra level  Cont telemetry but NSR since admit  Trending trop but thus far negative  Otherwise no metabolic cause found    Will have Dr. Leighton hsu to ensure no other imaging is needed but with stable CT head and recent stable MRA I don't feel strongly for imaging at this time.    However, he is certainly not safe to discharge home alone. Wife and son both state they can not care for him at home. SW/CM working on placement.           Cognitive deficits    Chronic due to CNS vasculitis  Son does note he was better when on steroids but will leave the decision to steroid therapy to outpatient neurologists  He is now back to his baseline           Type 2 diabetes mellitus with diabetic polyneuropathy, with long-term current use of insulin    Cont home insulin  Son states was on victoza as outpatient but ran out, will refill at discharge  No reported h/o hypoglycemia, monitor here          Essential hypertension    Cont home diltiazem, valsartan, atenolol  Stop IVF            Mixed hyperlipidemia    Cont home  atorvastatin          Major depressive disorder with single episode, in full remission    Cont home effexor            VTE Risk Mitigation         Ordered     IP VTE HIGH RISK PATIENT  Once      04/01/18 2122     Place sequential compression device  Until discontinued      04/01/18 2122             Ivett Orr MD  Department of Hospital Medicine   [facility]

## 2018-04-02 NOTE — ED PROVIDER NOTES
[facility]                                                 Chief Complaint  58 y.o. 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, also has had a left-sided stroke in the past years  Pt has been left with chronic episodic confusion at times, confused this afternoon  Pt was awoken by his side with acute confusion and memory issues prior to arrival  Patient has no acute focal deficit answers & all questions appropriately at this time  Patient's son states he has not eaten or drank in a couple days, dehydrated at home    The history is provided by the patient     Past Medical History   -- Essential hypertension     -- Internuclear ophthalmoplegia of left eye     -- Mixed hyperlipidemia     -- NAFLD (nonalcoholic fatty liver disease)     -- CHASE (nonalcoholic steatohepatitis)     -- Obesity     -- Stroke     -- Type 2 diabetes mellitus with diabetic polyneuropathy        Surgical history: Skin excision  No Known Allergies     Review of Systems and Physical Exam      Review of Systems  -- Constitution - no fever, denies fatigue, no weakness, no chills  -- Eyes - no tearing or redness, no visual disturbance  -- Ear, Nose - no tinnitus or earache, no nasal congestion or discharge  -- Mouth,Throat - no sore throat, no toothache, normal voice, normal swallowing  -- Respiratory - denies cough and congestion, no shortness of breath, no VALENTIN  -- Cardiovascular - denies chest pain, no palpitations, denies claudication  -- Gastrointestinal - denies abdominal pain, nausea, vomiting, or diarrhea  -- Genitourinary - no dysuria, no denies flank pain, no hematuria or frequency   -- Musculoskeletal - denies back pain, negative for myalgias and arthralgias   -- Neurological - dizziness and memory issues this evening  -- Skin - denies pallor, rash, or changes in skin. no hives or welts noted    BP (!)  160/80  Pulse 80   Temp 98 °F (36.7 °C) (Oral)   Resp 16  SpO2 97%      Physical Exam  -- Nursing note and vitals reviewed  -- Constitutional: Appears well-developed and well-nourished  -- Head: Atraumatic. Normocephalic. No obvious abnormality  -- Eyes: Pupils are equal and reactive to light. Normal conjunctiva and lids  -- Cardiac: Normal rate, regular rhythm and normal heart sounds  -- Pulmonary: Normal respiratory effort, breath sounds clear to auscultation  -- Abdominal: Soft, no tenderness. Normal bowel sounds. Normal liver edge  -- Musculoskeletal: Normal range of motion, no effusions. Joints stable   -- Neurological: No focal deficits. Showed good interaction with staff  -- Vascular: Posterior tibial, dorsalis pedis and radial pulses 2+ bilaterally      Emergency Room Course      Labs     K 3.7      CO2 23   BUN 13   CREATININE 1.0    (H)   ALKPHOS 73   AST 18   ALT 16   BILITOT 0.8   ALBUMIN 3.8   PROT 6.8   WBC 2.01 (L)   HGB 11.1 (L)   HCT 32.2 (L)      CPK 42   CPK 42   CPKMB 1.2   TROPONINI 0.013   INR 1.0   BNP 48   MG 1.9   TSH 1.385     EKG  -- The EKG findings today were without concerning findings from baseline    Radiology  -- The CT of the head performed in the ER today was negative for acute pathology  -- Chest x-ray showed no infiltrate and showed no acute pathology    Additional Work up  -- The magnesium and phosphorus were within normal limits  -- TSH drawn in the ER today was negative     Medications Given  -- sodium chloride 0.9% bolus 500 mL (0 mLs Intravenous Stopped 4/1/18 2030)     ED Physician Notes  -- The patient is not quite back to his neurologic baseline on ER evaluation  -- Stroke symptoms CNS vasculitis, patient feels dizzy and weak at this time  -- IV fluids and admitted overnight with neurology consult  in the morning  -- Family did not feel comfortable with his neurologic status and issues today    Diagnosis  -- The primary encounter diagnosis  was Syncope, unspecified syncope type.   -- A diagnosis of Weakness was also pertinent to this visit.    Disposition and Plan  -- Disposition: observation  -- Condition: stable  -- Telemetry monitoring  -- Morning labs  -- SCD hoses  -- Home medications  -- Nausea medication when necessary  -- Pain medication when necessary  -- Intravenous fluids  -- Routine monitoring  -- Bed rest until otherwise stated  -- Low salt diet  -- Protonix for GERD prophylaxis  -- EKG in the morning  -- Aspirin daily  -- Serial cardiac enzymes  -- Neurology consult  -- Neuro checks  -- 1800 diet  -- Hold home diabetes medications  -- [facility] insulin sliding scale     This note is dictated on Dragon Natural Speaking word recognition program.  There are word recognition mistakes that are occasionally missed on review.                  Jayesh Cho MD  04/02/18 0757

## 2018-04-02 NOTE — PLAN OF CARE
Problem: Patient Care Overview  Goal: Plan of Care Review  Outcome: Ongoing (interventions implemented as appropriate)  Patient confused to time. Reoriented frequently. IV fluids discontinued. Accu checks. Insulin given. Neurology consulted. MRI complete. Bed alarm engaged. Free of falls/injury. Son at side, recently moved from [place] to care for father. No distress this shift. Son understands plan of care and agrees.

## 2018-04-02 NOTE — TELEPHONE ENCOUNTER
Pt is admitted to [facility] currently--had an acute change in mental status. He is very confused and not talking much at all. States the treatment team feels he is back to baseline and want to discharge him. She reports he is nowhere near back to baseline. SW is attempting to help with placement after d/c for him. Bekah would like Dr Love and Beti to review the labs and images from this admission.

## 2018-04-02 NOTE — PLAN OF CARE
04/02/18 1254   Discharge Assessment   Assessment Type Discharge Planning Assessment   Confirmed/corrected address and phone number on facesheet? Yes   Assessment information obtained from? Patient;Caregiver;Medical Record   Expected Length of Stay (days) 2   Communicated expected length of stay with patient/caregiver yes   Prior to hospitilization cognitive status: Alert/Oriented   Prior to hospitalization functional status: Assistive Equipment;Needs Assistance   Current cognitive status: Alert/Oriented   Current Functional Status: Assistive Equipment;Needs Assistance   Lives With spouse;child(conrad), adult   Able to Return to Prior Arrangements unable to determine at this time (comments)   Is patient able to care for self after discharge? Unable to determine at this time (comments)   Who are your caregiver(s) and their phone number(s)? Lisseth Nunez [phone]   Patient's perception of discharge disposition home or selfcare   Readmission Within The Last 30 Days no previous admission in last 30 days   Patient currently being followed by outpatient case management? No   Patient currently receives any other outside agency services? No   Equipment Currently Used at Home cane, straight   Do you have any problems affording any of your prescribed medications? No   Is the patient taking medications as prescribed? yes   Does the patient have transportation home? Yes   Transportation Available family or friend will provide   Does the patient receive services at the [facility]? No   Discharge Plan A Home with family;Home Health   Discharge Plan B New Nursing Home placement - nursing home care facility   Patient/Family In Agreement With Plan yes     The pt is a 58 year old male who was admitted with weakness. The pt lives with his spouse and adult son. The pt does use a cane to ambulate. The pt does not use O2. The pt does not use hh. The pt is able to afford his medications. The pt has no other SW needs noted at this  time. SW will continue to follow and offer support as needed.    Garrett Lacey LMSW

## 2018-04-02 NOTE — HPI
Pt presented to ER yesterday with c/o weakness and dizziness. He is not very reliable for hx. His son is at bedside. He reports that he woke the patient up yesterday and he was more confused that his baseline. He reports that the patient was on prednisone for vasculitis. Unsure how long he has been off. He reports that the pharmacy was unable to get a refill from neurology. He is confused at baseline, yesterday was more than his usual. This am he is better and back to his baseline since he had run out of steroids. He denies any LOC. Patient can not remeber how he was feeling to tell us if he is better. Son reports that he was not eating much.     Has a significant hx of vasculitis/stroke. [place] [facility] heme/onc which has been treating him since his last admission.

## 2018-04-02 NOTE — HPI
"Bessy Nunez is a 58 y.o. male who is currently a patient of a demylinating disorder/ MS specialist in [facility] (Dr Love) for CNS Vasculitis which was found by angiogram after patient had presented initially with diplopia, stroke like changes,  and CNS white matter changes. The patient is on Cytoxan monthly via Neurology/ oncology for this.  He presented to the ER with dizziness and weakness yesterday- was admitted  after ER doc noted, "Patient's son states he has not eaten or drank in a couple days, dehydrated at home." He was given IVF in the ER.   Son stated to primary team that patient is back to his prior baseline since running out of steroids in January- neurology at [facility] is aware of this.   Patient is not able to give his history well.   There has been a change in caregiver status at home- wife has moved out and son now cares for patient and primary care team notes placement efforts are ongoing with case management.  Family stated keppra is for seizure prophylaxis.   EEG and MRA were checked last month (see below) and the patient has known cognitive slowing/ encephalopathy with vasculitis. He was on prednisone prior but is no longer on this.   "

## 2018-04-02 NOTE — PROGRESS NOTES
Staff Handoff  Phone report received from Brandan ORTIZ in ED and Melly ORTIZ brought up the patient in a w/c. Son at side. Patient has H/O CVA and CNS Vasculitis, has been having dizziness, confusion, and falls. Currently disoriented to time, not even aware that today is a holiday. Orthostatic vital signs done. POC reviewed and falls prec will be initiated and bed alarm set. Son is planning to stay after he goes home to get clothes. NS initiated at 125 cc/hr. Telemetry initiated. Has a little left sided weakness from old CVA. Sleepy and difficult to assess at present time. Patient going to sleep during admit questions and assessment. SCD's applied. Will continue to monitor and patient/son instructed to call for needs/asst.    Resident Handoff

## 2018-04-02 NOTE — PLAN OF CARE
04/02/18 1313   Discharge Reassessment   Assessment Type Discharge Planning Reassessment     Sw contacted pt's wife Lisseth and she stated that she was concerned bc the pt has declined in the last couple of weeks. Per pt's wife the pt is very confused and falls constantly. The pt's wife stated that the pt needs long term therapy and she would like to contact [facility]. She stated that it is difficult to care for the pt and that the pt is no where near his baseline. Jabari discussed nursing home placement with pt's wife but informed her that the pt is currently refusing nh placement. Jabari also discussed hiring a sitter to care for the pt and provided home instead contact information.     Garrett Lacey LMSW

## 2018-04-02 NOTE — SUBJECTIVE & OBJECTIVE
Past Medical History:   Diagnosis Date    CNS vasculitis 6/2/2017    Follows with Dr. Love By mri.  Several active lesions, many old. 5/13: MRA brain/neck, MRI brain w/wo contrast show no vascular occlusion, multiple foci of hyperintensity in deep cerebral periventricular white matter in pattern of demyelinating process.  5/17: MRI spine performed, no spinal cord lesions. Hospitalization 6/2017. EEG was performed negative for seizures.  Repeat MRI showing new lesion, question whether this was demyelination versus CVA. 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 Findings compatible with a small acute lacunar infarct adjacent to the left frontal horn.  Nonspecific enhancement just inferior to this region and extending into the left basal ganglia, as well as within the inferior aspect of the left cerebellum.  No evidence of a focal mass. 2.  Extensive increased signal intensity involving the periventricular white matter compatible with demyelinating disease versus vasculitis. 3.  Clinical correlation and followup recommended. 4.  This reportedly flattened in the EPIC and the corpus callosum medical record system.    Mixed hyperlipidemia 11/9/2012    NAFLD (nonalcoholic fatty liver disease) 10/11/2013    CHASE (nonalcoholic steatohepatitis)     Obesity     Stroke     Type 2 diabetes mellitus with diabetic polyneuropathy, with long-term current use of insulin 5/14/2017    Type 2 diabetes mellitus with hyperglycemia, with long-term current use of insulin 10/11/2013       Past Surgical History:   Procedure Laterality Date    SKIN CANCER EXCISION         Review of patient's allergies indicates:  No Known Allergies    No current facility-administered medications on file prior to encounter.      Current  "Outpatient Prescriptions on File Prior to Encounter   Medication Sig    aspirin (ECOTRIN) 81 MG EC tablet Take 81 mg by mouth once daily.    atenolol (TENORMIN) 50 MG tablet Take 1 tablet (50 mg total) by mouth once daily.    atorvastatin (LIPITOR) 40 MG tablet TAKE ONE TABLET BY MOUTH ONCE DAILY    blood sugar diagnostic Strp To check BG 4 times daily, to use with insurance preferred meter    blood-glucose meter kit To check BG 4 times daily, one touch verio meter. Dx code e11.65    calcium carbonate (OS-DONNA) 500 mg calcium (1,250 mg) tablet Take 2 tablets (1,000 mg total) by mouth once.    diltiaZEM (DILACOR XR) 240 MG CDCR TAKE ONE CAPSULE BY MOUTH ONCE DAILY    insulin glargine (BASAGLAR KWIKPEN U-100 INSULIN) 100 unit/mL (3 mL) InPn pen Inject 40 Units into the skin every evening.    insulin lispro (HUMALOG) 100 unit/mL injection Inject 20 units before meals    insulin needles, disposable, (BD ULTRA-FINE ANA PEN NEEDLES) 32 x 5/32 " Ndle Inject twice daily    insulin syringe-needle U-100 (INSULIN SYRINGE) 1/2 mL 30 gauge x 5/16 Syrg Use 3x/day    insulin syringe-needle U-100 0.5 mL 31 gauge x 5/16 Syrg     lancets Misc To check BG 4 times daily, one touch verio meter. Dx code e11.65    levETIRAcetam (KEPPRA) 500 MG Tab TAKE ONE TABLET BY MOUTH TWICE DAILY    liraglutide 0.6 mg/0.1 mL, 18 mg/3 mL, subq PNIJ (VICTOZA 3-TOMASZ) 0.6 mg/0.1 mL (18 mg/3 mL) PnIj Inject 1.8 mg into the skin once daily.    metformin (GLUCOPHAGE-XR) 500 MG 24 hr tablet Take 2 tablets (1,000 mg total) by mouth 2 (two) times daily with meals.    NOVOFINE PLUS 32 gauge x 1/6" Ndle     omega-3 fatty acids-vitamin E (FISH OIL) 1,000 mg Cap Take 1 capsule by mouth 2 (two) times daily.    ONETOUCH DELICA LANCETS 33 gauge Misc     ranitidine (ZANTAC) 150 MG tablet Take 1 tablet (150 mg total) by mouth 2 (two) times daily.    sulfamethoxazole-trimethoprim 800-160mg (BACTRIM DS) 800-160 mg Tab Take every Monday, Wednesday, and " Friday.  Infectious Disease will decide if this is needed in 3-4 months.    valsartan (DIOVAN) 320 MG tablet Take 1 tablet (320 mg total) by mouth once daily.    venlafaxine (EFFEXOR-XR) 75 MG 24 hr capsule Take 1 capsule (75 mg total) by mouth once daily.    vitamin D 1000 units Tab Take 5 tablets (5,000 Units total) by mouth once daily.     Family History     Problem Relation (Age of Onset)    Cancer Father    Diabetes Maternal Aunt    Heart disease Mother    Heart failure Mother    Hypertension Mother        Social History Main Topics    Smoking status: Never Smoker    Smokeless tobacco: Never Used    Alcohol use No    Drug use: No    Sexual activity: Not on file     Review of Systems   Unable to perform ROS: Other   Patient unreliable historian and unable to provide accurate review of systems due to chronic confusion.     Objective:     Vital Signs (Most Recent):  Temp: 96.1 °F (35.6 °C) (04/02/18 0740)  Pulse: 98 (04/02/18 0848)  Resp: 18 (04/02/18 0740)  BP: (!) 160/82 (04/02/18 0740)  SpO2: 96 % (04/02/18 0740) Vital Signs (24h Range):  Temp:  [96.1 °F (35.6 °C)-98 °F (36.7 °C)] 96.1 °F (35.6 °C)  Pulse:  [76-99] 98  Resp:  [16-20] 18  SpO2:  [95 %-100 %] 96 %  BP: (139-171)/(68-94) 160/82     Weight: 102.9 kg (226 lb 13.7 oz)  Body mass index is 32.55 kg/m².    Physical Exam   Constitutional: He appears well-developed and well-nourished. No distress.   HENT:   Head: Normocephalic and atraumatic.   Right Ear: External ear normal.   Left Ear: External ear normal.   Eyes: Conjunctivae and EOM are normal. Pupils are equal, round, and reactive to light. Right eye exhibits no discharge. Left eye exhibits no discharge.   Neck: Neck supple. No tracheal deviation present.   Cardiovascular: Normal rate and regular rhythm.  Exam reveals no gallop and no friction rub.    No murmur heard.  Pulmonary/Chest: Effort normal and breath sounds normal. No respiratory distress. He has no wheezes. He has no rales.    Abdominal: Soft. Bowel sounds are normal. He exhibits no distension. There is no tenderness. There is no rebound and no guarding.   Musculoskeletal: He exhibits no edema.   Neurological: He is alert. No cranial nerve deficit.   Oriented to person and place  Per son, he seems to be at his baseline status though son does not he was much better on when on steroids the confusion that he had yesterday is resolved   Skin: Skin is warm and dry.   Psychiatric: He has a normal mood and affect. His behavior is normal.   Nursing note and vitals reviewed.        CRANIAL NERVES     CN III, IV, VI   Pupils are equal, round, and reactive to light.  Extraocular motions are normal.        Significant Labs:   CBC:   Recent Labs  Lab 04/01/18 1825 04/02/18  0549   WBC 2.01* 2.39*   HGB 11.1* 10.1*   HCT 32.2* 29.9*    212     CMP:   Recent Labs  Lab 04/01/18 1825 04/02/18  0549    144   K 3.7 3.4*    113*   CO2 23 22*   * 150*   BUN 13 11   CREATININE 1.0 0.9   CALCIUM 9.5 8.9   PROT 6.8 5.9*   ALBUMIN 3.8 3.3*   BILITOT 0.8 0.5   ALKPHOS 73 64   AST 18 17   ALT 16 14   ANIONGAP 11 9   EGFRNONAA >60 >60     Recent Labs  Lab 04/01/18 1825 04/02/18  0549   CPK 42  42  --   --    CPKMB 1.2  --   --    TROPONINI 0.013  < > 0.018   MB 2.9  --   --    < > = values in this interval not displayed.     Lab Results   Component Value Date    TSH 1.385 04/01/2018     Mag 1.9    A1C 5.7   phos 4.1  BNP 48    keppra level in process    Urinalysis    Recent Labs  Lab 04/02/18  0828   COLORU Yellow   SPECGRAV 1.020   PHUR 5.0   PROTEINUA Trace*   NITRITE Negative   LEUKOCYTESUR Negative   UROBILINOGEN Negative       Cardiac Markers:   Recent Labs  Lab 04/01/18  1825   BNP 48       Significant Imaging:     CXR The lungs are clear, with normal appearance of pulmonary vasculature and no pleural effusion or pneumothorax.    The cardiac silhouette is normal in size. The hilar and mediastinal contours are  unremarkable.    Bones are intact.    Ct head Intracranial compartment:    Ventricles and sulci are normal in size for age without evidence of hydrocephalus. No extra-axial blood or fluid collections.    Chronic infarct identified LEFT cerebellar hemisphere with lacunar infarct LEFT globus pallidus region.  No CT findings of acute large territorial infarct.  Periventricular low attenuation consistent with chronic microvascular ischemic change.  No parenchymal mass, hemorrhage, edema or major vascular distribution infarct. If there is additional concern for acute ischemia, MRI with diffusion-weighted imaging could be obtained.    Atherosclerotic change at level of the carotids.    Skull/extracranial contents (limited evaluation): No fracture. Mastoid air cells and paranasal sinuses are essentially clear.    4/1 ekg Normal sinus rhythm  Moderate voltage criteria for LVH, may be normal variant  Nonspecific T wave abnormality  Abnormal ECG  When compared with ECG of 20-NOV-2017 14:50,  No significant change was found    4/2 ekg Normal sinus rhythm  Moderate voltage criteria for LVH, may be normal variant  Inferior infarct ,age undetermined  T wave abnormality, consider lateral ischemia  Abnormal ECG  When compared with ECG of 01-APR-2018 18:28,  Inferior infarct is now Present  Inverted T waves have replaced nonspecific T wave abnormality in Lateral leads

## 2018-04-02 NOTE — SUBJECTIVE & OBJECTIVE
Past Medical History:   Diagnosis Date    CNS vasculitis 6/2/2017    Follows with Dr. Love By mri.  Several active lesions, many old. 5/13: MRA brain/neck, MRI brain w/wo contrast show no vascular occlusion, multiple foci of hyperintensity in deep cerebral periventricular white matter in pattern of demyelinating process.  5/17: MRI spine performed, no spinal cord lesions. Hospitalization 6/2017. EEG was performed negative for seizures.  Repeat MRI showing new lesion, question whether this was demyelination versus CVA. 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 Findings compatible with a small acute lacunar infarct adjacent to the left frontal horn.  Nonspecific enhancement just inferior to this region and extending into the left basal ganglia, as well as within the inferior aspect of the left cerebellum.  No evidence of a focal mass. 2.  Extensive increased signal intensity involving the periventricular white matter compatible with demyelinating disease versus vasculitis. 3.  Clinical correlation and followup recommended. 4.  This reportedly flattened in the EPIC and the [facility] medical record system.    Mixed hyperlipidemia 11/9/2012    NAFLD (nonalcoholic fatty liver disease) 10/11/2013    CHASE (nonalcoholic steatohepatitis)     Obesity     Stroke     Type 2 diabetes mellitus with diabetic polyneuropathy, with long-term current use of insulin 5/14/2017    Type 2 diabetes mellitus with hyperglycemia, with long-term current use of insulin 10/11/2013       Past Surgical History:   Procedure Laterality Date    SKIN CANCER EXCISION         Review of patient's allergies indicates:  No Known Allergies    I have reviewed all of this patient's past medical and surgical histories as well as  "family and social histories and active allergies and medications as documented in the electronic medical record.      No current facility-administered medications on file prior to encounter.      Current Outpatient Prescriptions on File Prior to Encounter   Medication Sig    aspirin (ECOTRIN) 81 MG EC tablet Take 81 mg by mouth once daily.    atenolol (TENORMIN) 50 MG tablet Take 1 tablet (50 mg total) by mouth once daily.    atorvastatin (LIPITOR) 40 MG tablet TAKE ONE TABLET BY MOUTH ONCE DAILY    blood sugar diagnostic Strp To check BG 4 times daily, to use with insurance preferred meter    blood-glucose meter kit To check BG 4 times daily, one touch verio meter. Dx code e11.65    calcium carbonate (OS-DONNA) 500 mg calcium (1,250 mg) tablet Take 2 tablets (1,000 mg total) by mouth once.    diltiaZEM (DILACOR XR) 240 MG CDCR TAKE ONE CAPSULE BY MOUTH ONCE DAILY    insulin glargine (BASAGLAR KWIKPEN U-100 INSULIN) 100 unit/mL (3 mL) InPn pen Inject 40 Units into the skin every evening.    insulin lispro (HUMALOG) 100 unit/mL injection Inject 20 units before meals    insulin needles, disposable, (BD ULTRA-FINE ANA PEN NEEDLES) 32 x 5/32 " Ndle Inject twice daily    insulin syringe-needle U-100 (INSULIN SYRINGE) 1/2 mL 30 gauge x 5/16 Syrg Use 3x/day    insulin syringe-needle U-100 0.5 mL 31 gauge x 5/16 Syrg     lancets Misc To check BG 4 times daily, one touch verio meter. Dx code e11.65    levETIRAcetam (KEPPRA) 500 MG Tab TAKE ONE TABLET BY MOUTH TWICE DAILY    liraglutide 0.6 mg/0.1 mL, 18 mg/3 mL, subq PNIJ (VICTOZA 3-TOMASZ) 0.6 mg/0.1 mL (18 mg/3 mL) PnIj Inject 1.8 mg into the skin once daily.    metformin (GLUCOPHAGE-XR) 500 MG 24 hr tablet Take 2 tablets (1,000 mg total) by mouth 2 (two) times daily with meals.    NOVOFINE PLUS 32 gauge x 1/6" Ndle     omega-3 fatty acids-vitamin E (FISH OIL) 1,000 mg Cap Take 1 capsule by mouth 2 (two) times daily.    ONETOUCH DELICA LANCETS 33 gauge Misc  "    ranitidine (ZANTAC) 150 MG tablet Take 1 tablet (150 mg total) by mouth 2 (two) times daily.    sulfamethoxazole-trimethoprim 800-160mg (BACTRIM DS) 800-160 mg Tab Take every Monday, Wednesday, and Friday.  Infectious Disease will decide if this is needed in 3-4 months.    valsartan (DIOVAN) 320 MG tablet Take 1 tablet (320 mg total) by mouth once daily.    venlafaxine (EFFEXOR-XR) 75 MG 24 hr capsule Take 1 capsule (75 mg total) by mouth once daily.    vitamin D 1000 units Tab Take 5 tablets (5,000 Units total) by mouth once daily.     Family History     Problem Relation (Age of Onset)    Cancer Father    Diabetes Maternal Aunt    Heart disease Mother    Heart failure Mother    Hypertension Mother        Social History Main Topics    Smoking status: Never Smoker    Smokeless tobacco: Never Used    Alcohol use No    Drug use: No    Sexual activity: Not on file     Review of Systems   Unable to perform ROS: Other   encephalopathy from vasculitis ongoing/long standing  Objective:     Vital Signs (Most Recent):  Temp: 98.3 °F (36.8 °C) (04/02/18 1154)  Pulse: 74 (04/02/18 1154)  Resp: 16 (04/02/18 1154)  BP: 136/77 (04/02/18 1154)  SpO2: 95 % (04/02/18 1154) Vital Signs (24h Range):  Temp:  [96.1 °F (35.6 °C)-98.3 °F (36.8 °C)] 98.3 °F (36.8 °C)  Pulse:  [74-99] 74  Resp:  [16-20] 16  SpO2:  [95 %-100 %] 95 %  BP: (136-171)/(68-94) 136/77     Weight: 102.9 kg (226 lb 13.7 oz)  Body mass index is 32.55 kg/m².    Physical Exam         Gen Appearance, well developed/nourished in no apparent distress  CV: 2+ distal pulses with no edema or swelling  Neuro:  MS: Awake, alert, oriented to place, person, but not time or  situation. Sustains attention. Recent recall is mildly to moderately confused/remote memory intact, Language is full to spontaneous speech/repetition/naming/comprehension. Fund of Knowledge is full  CN: Optic discs are flat with normal vasculature, PERRL, Extraoccular movements and visual fields  are full. Normal facial sensation and strength, Hearing symmetric, Tongue and Palate are midline and strong. Shoulder Shrug symmetric and strong.  Motor: Normal bulk, tone, no abnormal movements. 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     CT head 4/1/2018: No acute abnormality. If there is additional concern for acute ischemia, MRI with diffusion-weighted imaging could be obtained.

## 2018-04-03 VITALS
RESPIRATION RATE: 20 BRPM | OXYGEN SATURATION: 98 % | WEIGHT: 226.88 LBS | SYSTOLIC BLOOD PRESSURE: 131 MMHG | HEIGHT: 70 IN | HEART RATE: 66 BPM | BODY MASS INDEX: 32.48 KG/M2 | TEMPERATURE: 97 F | DIASTOLIC BLOOD PRESSURE: 59 MMHG

## 2018-04-03 LAB
LEVETIRACETAM SERPL-MCNC: 19.9 UG/ML (ref 3–60)
POCT GLUCOSE: 148 MG/DL (ref 70–110)
POCT GLUCOSE: 219 MG/DL (ref 70–110)

## 2018-04-03 PROCEDURE — 25000003 PHARM REV CODE 250: Performed by: INTERNAL MEDICINE

## 2018-04-03 PROCEDURE — G0378 HOSPITAL OBSERVATION PER HR: HCPCS

## 2018-04-03 PROCEDURE — 25000003 PHARM REV CODE 250: Performed by: SURGERY

## 2018-04-03 PROCEDURE — 96372 THER/PROPH/DIAG INJ SC/IM: CPT

## 2018-04-03 PROCEDURE — 94760 N-INVAS EAR/PLS OXIMETRY 1: CPT

## 2018-04-03 PROCEDURE — 99217 PR OBSERVATION CARE DISCHARGE: CPT | Mod: ,,, | Performed by: INTERNAL MEDICINE

## 2018-04-03 RX ADMIN — INSULIN ASPART 7 UNITS: 100 INJECTION, SOLUTION INTRAVENOUS; SUBCUTANEOUS at 11:04

## 2018-04-03 RX ADMIN — DILTIAZEM HYDROCHLORIDE 240 MG: 120 CAPSULE, EXTENDED RELEASE ORAL at 08:04

## 2018-04-03 RX ADMIN — LEVETIRACETAM 500 MG: 500 TABLET, FILM COATED ORAL at 08:04

## 2018-04-03 RX ADMIN — ASPIRIN 81 MG: 81 TABLET, COATED ORAL at 08:04

## 2018-04-03 RX ADMIN — INSULIN ASPART 7 UNITS: 100 INJECTION, SOLUTION INTRAVENOUS; SUBCUTANEOUS at 08:04

## 2018-04-03 RX ADMIN — ATORVASTATIN CALCIUM 40 MG: 40 TABLET, FILM COATED ORAL at 08:04

## 2018-04-03 RX ADMIN — ATENOLOL 50 MG: 25 TABLET ORAL at 08:04

## 2018-04-03 RX ADMIN — VALSARTAN 320 MG: 80 TABLET, FILM COATED ORAL at 08:04

## 2018-04-03 RX ADMIN — INSULIN ASPART 4 UNITS: 100 INJECTION, SOLUTION INTRAVENOUS; SUBCUTANEOUS at 11:04

## 2018-04-03 RX ADMIN — PANTOPRAZOLE SODIUM 40 MG: 40 TABLET, DELAYED RELEASE ORAL at 08:04

## 2018-04-03 RX ADMIN — VENLAFAXINE HYDROCHLORIDE 75 MG: 37.5 CAPSULE, EXTENDED RELEASE ORAL at 08:04

## 2018-04-03 NOTE — HOSPITAL COURSE
He was kept over night for neuro consult and MRI. He shows no new lesions. Dr Manzanares saw the patent and would like him to be followed bu his neurologist at d/c. Already has appt scheduled for tomorrow.

## 2018-04-03 NOTE — PHARMACY MED REC
Discharge Medication Reconciliation - Pharmacy Consult Note     The discharge medication regimen was reviewed by Lavonne Cruz, Pharmacist. The following medications have been adjusted/changed:     Patient is to INITIATE the following medications   · No change    Patient is to DISCONTINUE the following medications   ·  ranitidine 150 MG tablet (ZANTAC)       Patient is to HOLD the following medications   · No change    The following medications DOSE or FREQUENCY was CHANGED  · No change    The following issues/concerns were addressed prior to discharge:   Discharge medication counseling   · Spoke with Mr. Nunez about inpatient medications. Patient seems disoriented. Nurse Liliana advised to speak with patients wife. Patient stated he did not experience any uncontrolled pain during his stay. States he does not have any questions about medications.  Patient does not have any discharge medications.     Bedside delivery of medications   · No comments     Medication affordability   · No comments     PHARMACIST NAME: Lavonne Cruz   EXT [id number]

## 2018-04-03 NOTE — PLAN OF CARE
Patient oriented x 4. Still forgetful at times. Accu checks. Insulin given. V/S stable. SR on tele. Ambulating well. Free of falls/injury. Follow up education given written and verbally to patient and wife. No questions or concerns. Patient and wife understand plan of care and agree.

## 2018-04-03 NOTE — SUBJECTIVE & OBJECTIVE
Review of Systems   Unable to perform ROS: Other   Constitutional: Negative for chills and fever.   HENT: Negative for congestion, postnasal drip and sore throat.    Eyes: Negative for photophobia.   Respiratory: Negative for chest tightness and shortness of breath.    Cardiovascular: Negative for chest pain.   Gastrointestinal: Negative for abdominal distention, abdominal pain, blood in stool and vomiting.   Genitourinary: Negative for dysuria, flank pain and hematuria.   Musculoskeletal: Negative for back pain.   Skin: Negative for pallor.   Neurological: Negative for dizziness, seizures, facial asymmetry, speech difficulty and numbness.   Hematological: Does not bruise/bleed easily.   Psychiatric/Behavioral: Negative for agitation and suicidal ideas. The patient is not nervous/anxious.    Patient unreliable historian and unable to provide accurate review of systems due to chronic confusion.     Objective:     Vital Signs (Most Recent):  Temp: 97.9 °F (36.6 °C) (04/03/18 1114)  Pulse: 70 (04/03/18 1114)  Resp: 20 (04/03/18 1114)  BP: (!) 163/79 (04/03/18 1114)  SpO2: 95 % (04/03/18 1114) Vital Signs (24h Range):  Temp:  [96.3 °F (35.7 °C)-98.5 °F (36.9 °C)] 97.9 °F (36.6 °C)  Pulse:  [64-77] 70  Resp:  [16-20] 20  SpO2:  [94 %-98 %] 95 %  BP: (115-175)/(60-80) 163/79     Weight: 102.9 kg (226 lb 13.7 oz)  Body mass index is 32.55 kg/m².    Physical Exam   Constitutional: He appears well-developed and well-nourished. No distress.   HENT:   Head: Normocephalic and atraumatic.   Right Ear: External ear normal.   Left Ear: External ear normal.   Eyes: Conjunctivae and EOM are normal. Pupils are equal, round, and reactive to light. Right eye exhibits no discharge. Left eye exhibits no discharge.   Neck: Neck supple. No tracheal deviation present.   Cardiovascular: Normal rate and regular rhythm.  Exam reveals no gallop and no friction rub.    No murmur heard.  Pulmonary/Chest: Effort normal and breath sounds normal.  No respiratory distress. He has no wheezes. He has no rales.   Abdominal: Soft. Bowel sounds are normal. He exhibits no distension. There is no tenderness. There is no rebound and no guarding.   Musculoskeletal: He exhibits no edema.   Neurological: He is alert. No cranial nerve deficit.   Oriented to person and place  Per son, he seems to be at his baseline status though son does not he was much better on when on steroids the confusion that he had yesterday is resolved   Skin: Skin is warm and dry.   Psychiatric: He has a normal mood and affect. His behavior is normal.   Nursing note and vitals reviewed.        CRANIAL NERVES     CN III, IV, VI   Pupils are equal, round, and reactive to light.  Extraocular motions are normal.        Significant Labs:   CBC:     Recent Labs  Lab 04/01/18 1825 04/02/18  0549   WBC 2.01* 2.39*   HGB 11.1* 10.1*   HCT 32.2* 29.9*    212     CMP:     Recent Labs  Lab 04/01/18 1825 04/02/18  0549    144   K 3.7 3.4*    113*   CO2 23 22*   * 150*   BUN 13 11   CREATININE 1.0 0.9   CALCIUM 9.5 8.9   PROT 6.8 5.9*   ALBUMIN 3.8 3.3*   BILITOT 0.8 0.5   ALKPHOS 73 64   AST 18 17   ALT 16 14   ANIONGAP 11 9   EGFRNONAA >60 >60     Recent Labs  Lab 04/01/18 1825 04/02/18  1200   CPK 42  42  --   --    CPKMB 1.2  --   --    TROPONINI 0.013  < > 0.011   MB 2.9  --   --    < > = values in this interval not displayed.     Lab Results   Component Value Date    TSH 1.385 04/01/2018     Mag 1.9    A1C 5.7   phos 4.1  BNP 48    keppra level in process    Urinalysis  Recent Labs  Lab 04/02/18  0828   COLORU Yellow   SPECGRAV 1.020   PHUR 5.0   PROTEINUA Trace*   NITRITE Negative   LEUKOCYTESUR Negative   UROBILINOGEN Negative         Cardiac Markers:     Recent Labs  Lab 04/01/18  1825   BNP 48       Significant Imaging:     CXR The lungs are clear, with normal appearance of pulmonary vasculature and no pleural effusion or pneumothorax.    The cardiac silhouette is normal  in size. The hilar and mediastinal contours are unremarkable.    Bones are intact.    Ct head Intracranial compartment:    Ventricles and sulci are normal in size for age without evidence of hydrocephalus. No extra-axial blood or fluid collections.    Chronic infarct identified LEFT cerebellar hemisphere with lacunar infarct LEFT globus pallidus region.  No CT findings of acute large territorial infarct.  Periventricular low attenuation consistent with chronic microvascular ischemic change.  No parenchymal mass, hemorrhage, edema or major vascular distribution infarct. If there is additional concern for acute ischemia, MRI with diffusion-weighted imaging could be obtained.    Atherosclerotic change at level of the carotids.    Skull/extracranial contents (limited evaluation): No fracture. Mastoid air cells and paranasal sinuses are essentially clear.      MRI  4/2 Age-appropriate generalized volume loss with scattered foci of T2/FLAIR signal abnormality within the supratentorial white matter and jose while nonspecific suggestive for moderate to severe degree of  chronic microvascular ischemic change.    No evidence for acute infarction or enhancing lesion.      4/1 ekg Normal sinus rhythm  Moderate voltage criteria for LVH, may be normal variant  Nonspecific T wave abnormality  Abnormal ECG  When compared with ECG of 20-NOV-2017 14:50,  No significant change was found    4/2 ekg Normal sinus rhythm  Moderate voltage criteria for LVH, may be normal variant  Inferior infarct ,age undetermined  T wave abnormality, consider lateral ischemia  Abnormal ECG  When compared with ECG of 01-APR-2018 18:28,  Inferior infarct is now Present  Inverted T waves have replaced nonspecific T wave abnormality in Lateral leads

## 2018-04-03 NOTE — PLAN OF CARE
Problem: Patient Care Overview  Goal: Plan of Care Review  Outcome: Ongoing (interventions implemented as appropriate)  Vitals stable, afebrile, no signs of distress noted. Pt denies any pain this shift. Pt denies any dizziness or syncopal episodes. Neuro checks are normal, pt AAOx4 when asked questions. Cardiac monitor, SR. Son at bedside. Accu checks AC HS, Aspart and Levimir given before bed. Fall precautions maintained, call bell in reach, bed locked and low. Pt agrees with plan of care.

## 2018-04-03 NOTE — SUBJECTIVE & OBJECTIVE
Review of Systems   Unable to perform ROS: Other   Patient unreliable historian and unable to provide accurate review of systems due to chronic confusion.     Objective:     Vital Signs (Most Recent):  Temp: 97.9 °F (36.6 °C) (04/03/18 1114)  Pulse: 70 (04/03/18 1114)  Resp: 20 (04/03/18 1114)  BP: (!) 163/79 (04/03/18 1114)  SpO2: 95 % (04/03/18 1114) Vital Signs (24h Range):  Temp:  [96.3 °F (35.7 °C)-98.5 °F (36.9 °C)] 97.9 °F (36.6 °C)  Pulse:  [64-77] 70  Resp:  [16-20] 20  SpO2:  [94 %-98 %] 95 %  BP: (115-175)/(60-80) 163/79     Weight: 102.9 kg (226 lb 13.7 oz)  Body mass index is 32.55 kg/m².    Physical Exam   Constitutional: He appears well-developed and well-nourished. No distress.   HENT:   Head: Normocephalic and atraumatic.   Right Ear: External ear normal.   Left Ear: External ear normal.   Eyes: Conjunctivae and EOM are normal. Pupils are equal, round, and reactive to light. Right eye exhibits no discharge. Left eye exhibits no discharge.   Neck: Neck supple. No tracheal deviation present.   Cardiovascular: Normal rate and regular rhythm.  Exam reveals no gallop and no friction rub.    No murmur heard.  Pulmonary/Chest: Effort normal and breath sounds normal. No respiratory distress. He has no wheezes. He has no rales.   Abdominal: Soft. Bowel sounds are normal. He exhibits no distension. There is no tenderness. There is no rebound and no guarding.   Musculoskeletal: He exhibits no edema.   Neurological: He is alert. No cranial nerve deficit.   Oriented to person and place  Per son, he seems to be at his baseline status though son does not he was much better on when on steroids the confusion that he had yesterday is resolved   Skin: Skin is warm and dry.   Psychiatric: He has a normal mood and affect. His behavior is normal.   Nursing note and vitals reviewed.        CRANIAL NERVES     CN III, IV, VI   Pupils are equal, round, and reactive to light.  Extraocular motions are normal.         Significant Labs:   CBC:     Recent Labs  Lab 04/01/18 1825 04/02/18  0549   WBC 2.01* 2.39*   HGB 11.1* 10.1*   HCT 32.2* 29.9*    212     CMP:     Recent Labs  Lab 04/01/18  1825 04/02/18  0549    144   K 3.7 3.4*    113*   CO2 23 22*   * 150*   BUN 13 11   CREATININE 1.0 0.9   CALCIUM 9.5 8.9   PROT 6.8 5.9*   ALBUMIN 3.8 3.3*   BILITOT 0.8 0.5   ALKPHOS 73 64   AST 18 17   ALT 16 14   ANIONGAP 11 9   EGFRNONAA >60 >60     Recent Labs  Lab 04/01/18 1825 04/02/18  1200   CPK 42  42  --   --    CPKMB 1.2  --   --    TROPONINI 0.013  < > 0.011   MB 2.9  --   --    < > = values in this interval not displayed.     Lab Results   Component Value Date    TSH 1.385 04/01/2018     Mag 1.9    A1C 5.7   phos 4.1  BNP 48    keppra level in process    Urinalysis  Recent Labs  Lab 04/02/18  0828   COLORU Yellow   SPECGRAV 1.020   PHUR 5.0   PROTEINUA Trace*   NITRITE Negative   LEUKOCYTESUR Negative   UROBILINOGEN Negative         Cardiac Markers:     Recent Labs  Lab 04/01/18  1825   BNP 48       Significant Imaging:     CXR The lungs are clear, with normal appearance of pulmonary vasculature and no pleural effusion or pneumothorax.    The cardiac silhouette is normal in size. The hilar and mediastinal contours are unremarkable.    Bones are intact.    Ct head Intracranial compartment:    Ventricles and sulci are normal in size for age without evidence of hydrocephalus. No extra-axial blood or fluid collections.    Chronic infarct identified LEFT cerebellar hemisphere with lacunar infarct LEFT globus pallidus region.  No CT findings of acute large territorial infarct.  Periventricular low attenuation consistent with chronic microvascular ischemic change.  No parenchymal mass, hemorrhage, edema or major vascular distribution infarct. If there is additional concern for acute ischemia, MRI with diffusion-weighted imaging could be obtained.    Atherosclerotic change at level of the  carotids.    Skull/extracranial contents (limited evaluation): No fracture. Mastoid air cells and paranasal sinuses are essentially clear.      MRI  4/2 Age-appropriate generalized volume loss with scattered foci of T2/FLAIR signal abnormality within the supratentorial white matter and jose while nonspecific suggestive for moderate to severe degree of  chronic microvascular ischemic change.    No evidence for acute infarction or enhancing lesion.      4/1 ekg Normal sinus rhythm  Moderate voltage criteria for LVH, may be normal variant  Nonspecific T wave abnormality  Abnormal ECG  When compared with ECG of 20-NOV-2017 14:50,  No significant change was found    4/2 ekg Normal sinus rhythm  Moderate voltage criteria for LVH, may be normal variant  Inferior infarct ,age undetermined  T wave abnormality, consider lateral ischemia  Abnormal ECG  When compared with ECG of 01-APR-2018 18:28,  Inferior infarct is now Present  Inverted T waves have replaced nonspecific T wave abnormality in Lateral leads

## 2018-04-03 NOTE — DISCHARGE SUMMARY
[facility] Medicine  Discharge Summary      Patient Name: Bessy Nunez  MRN: [id number]  Admission Date: 4/1/2018  Hospital Length of Stay: 0 days  Discharge Date and Time:  04/03/2018 12:10 PM  Attending Physician: Ivett Orr MD   Discharging Provider: Geno Reyes NP  Primary Care Provider: Edgar Nova MD      HPI:   Pt presented to ER yesterday with c/o weakness and dizziness. He is not very reliable for hx. His son is at bedside. He reports that he woke the patient up yesterday and he was more confused that his baseline. He reports that the patient was on prednisone for vasculitis. Unsure how long he has been off. He reports that the pharmacy was unable to get a refill from neurology. He is confused at baseline, yesterday was more than his usual. This am he is better and back to his baseline since he had run out of steroids. He denies any LOC. Patient can not remeber how he was feeling to tell us if he is better. Son reports that he was not eating much.     Has a significant hx of vasculitis/stroke. He [place] [facility] [place] as heme/onc which has been treating him since his last admission.     * No surgery found *      Hospital Course:   He was kept over night for neuro consult and MRI. He shows no new lesions. Dr Manzanares saw the patent and would like him to be followed bu his neurologist at d/c. Already has appt scheduled for tomorrow.      Consults:   Consults         Status Ordering Provider     Inpatient consult to Neurology  Once     Provider:  Marcio Manzanares MD    Completed NATALY VERGARA          * CNS vasculitis    See above        Transient alteration of awareness    Woke up from nap disoriented--NO SYNCOPE reported by son who witnessed the event  Per son back to baseline now  Blood sugar was normal at that time  Checking keppra level  Cont telemetry but NSR since admit  Trending trop but thus far negative  Otherwise no metabolic cause  found    Will have Dr. Leighton hsu to ensure no other imaging is needed but with stable CT head and recent stable MRA I don't feel strongly for imaging at this time. Did MRI nothing new    However, he is certainly not safe to discharge home alone. Wife and son both state they can not care for him at home. SW/CM working on placement. Pt refuses. 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 Today he was much more alert and oriented than yesterday. We discussed long term placement with wife,. Son and patient. Patient refuses.  has discussed that patient needs 24hr care. Info given for sitters, etc will resume home health as well upon d/c. F/u already made with Dr Alvarenga in am          Type 2 diabetes mellitus with diabetic polyneuropathy, with long-term current use of insulin    Cont home insulin  Son states was on victoza as outpatient but ran out, will refill at discharge  No reported h/o hypoglycemia, monitor here          Essential hypertension    Cont home diltiazem, valsartan, atenolol  Stop IVF            Mixed hyperlipidemia    Cont home atorvastatin          Major depressive disorder with single episode, in full remission    Cont home effexor            Final Active Diagnoses:    Diagnosis Date Noted POA    PRINCIPAL PROBLEM:  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] 06/02/2017 Yes    Transient alteration of awareness [R40.4] 04/01/2018 Yes    Cognitive deficits [R41.89] 12/04/2017 Yes    Encephalopathy [G93.40] 05/26/2017 Yes    Type 2 diabetes mellitus with diabetic polyneuropathy, with long-term current use of insulin [E11.42, Z79.4] 05/14/2017 Not Applicable    Major depressive disorder with single episode, in full remission [F32.5] 11/09/2012 Yes      Chronic    Mixed hyperlipidemia [E78.2] 11/09/2012 Yes    Essential hypertension [I10] 11/09/2012 Yes      Problems Resolved During this Admission:    Diagnosis Date Noted Date Resolved POA       Discharged Condition: stable    Disposition: Home or Self Care    Follow Up:  Follow-up Information     Edgar Nova MD.    Specialty:  Internal Medicine  Why:  outpatient services  Contact information:  [address] [address]  [phone]             Shana Love MD In 1 day.    Specialty:  Neurology  Why:  as scheduled  Contact information:  [address]  [place] [address]  [phone]                 Patient Instructions:     Ambulatory referral to Home Health   Referral Priority: Routine Referral Type: Home Health   Referral Reason: Specialty Services Required    Requested Specialty: Home Health Services    Number of Visits Requested: 1          Significant Diagnostic Studies:   Significant Labs:   CBC:     Recent Labs  Lab 04/01/18 1825 04/02/18  0549   WBC 2.01* 2.39*   HGB 11.1* 10.1*   HCT 32.2* 29.9*    212     CMP:     Recent Labs  Lab 04/01/18  1825 04/02/18  0549    144   K 3.7 3.4*    113*   CO2 23 22*   * 150*   BUN 13 11   CREATININE 1.0 0.9   CALCIUM 9.5 8.9   PROT 6.8 5.9*   ALBUMIN 3.8 3.3*   BILITOT 0.8 0.5   ALKPHOS 73 64   AST 18 17   ALT 16 14   ANIONGAP 11 9   EGFRNONAA >60 >60     Recent Labs  Lab 04/01/18  1825 04/02/18  1200   CPK 42  42  --   --    CPKMB 1.2  --   --    TROPONINI 0.013  < > 0.011   MB 2.9  --   --    < > = values in this interval not displayed.     Lab Results   Component Value Date    TSH 1.385 04/01/2018     Mag 1.9    A1C 5.7   phos 4.1  BNP 48    keppra level in process    Urinalysis  Recent Labs  Lab 04/02/18  0828   COLORU Yellow   SPECGRAV 1.020   PHUR 5.0   PROTEINUA Trace*   NITRITE Negative   LEUKOCYTESUR Negative   UROBILINOGEN Negative         Cardiac Markers:     Recent Labs  Lab 04/01/18  1825   BNP 48        Significant Imaging:     CXR The lungs are clear, with normal appearance of pulmonary vasculature and no pleural effusion or pneumothorax.    The cardiac silhouette is normal in size. The hilar and mediastinal contours are unremarkable.    Bones are intact.    Ct head Intracranial compartment:    Ventricles and sulci are normal in size for age without evidence of hydrocephalus. No extra-axial blood or fluid collections.    Chronic infarct identified LEFT cerebellar hemisphere with lacunar infarct LEFT globus pallidus region.  No CT findings of acute large territorial infarct.  Periventricular low attenuation consistent with chronic microvascular ischemic change.  No parenchymal mass, hemorrhage, edema or major vascular distribution infarct. If there is additional concern for acute ischemia, MRI with diffusion-weighted imaging could be obtained.    Atherosclerotic change at level of the carotids.    Skull/extracranial contents (limited evaluation): No fracture. Mastoid air cells and paranasal sinuses are essentially clear.      MRI  4/2 Age-appropriate generalized volume loss with scattered foci of T2/FLAIR signal abnormality within the supratentorial white matter and jose while nonspecific suggestive for moderate to severe degree of  chronic microvascular ischemic change.    No evidence for acute infarction or enhancing lesion.      4/1 ekg Normal sinus rhythm  Moderate voltage criteria for LVH, may be normal variant  Nonspecific T wave abnormality  Abnormal ECG  When compared with ECG of 20-NOV-2017 14:50,  No significant change was found    4/2 ekg Normal sinus rhythm  Moderate voltage criteria for LVH, may be normal variant  Inferior infarct ,age undetermined  T wave abnormality, consider lateral ischemia  Abnormal ECG  When compared with ECG of 01-APR-2018 18:28,  Inferior infarct is now Present  Inverted T waves have replaced nonspecific T wave abnormality in Lateral leads    Pending Diagnostic Studies:   "   None         Medications:  Reconciled Home Medications:      Medication List      CONTINUE taking these medications    aspirin 81 MG EC tablet  Commonly known as:  ECOTRIN     atenolol 50 MG tablet  Commonly known as:  TENORMIN  Take 1 tablet (50 mg total) by mouth once daily.     atorvastatin 40 MG tablet  Commonly known as:  LIPITOR  TAKE ONE TABLET BY MOUTH ONCE DAILY     blood sugar diagnostic Strp  To check BG 4 times daily, to use with insurance preferred meter     blood-glucose meter kit  To check BG 4 times daily, one touch verio meter. Dx code e11.65     calcium carbonate 500 mg calcium (1,250 mg) tablet  Commonly known as:  OS-DONNA  Take 2 tablets (1,000 mg total) by mouth once.     diltiaZEM 240 MG Cdcr  Commonly known as:  DILACOR XR  TAKE ONE CAPSULE BY MOUTH ONCE DAILY     insulin glargine 100 unit/mL (3 mL) Inpn pen  Commonly known as:  BASAGLAR KWIKPEN U-100 INSULIN  Inject 40 Units into the skin every evening.     insulin lispro 100 unit/mL injection  Commonly known as:  HUMALOG  Inject 20 units before meals     * insulin syringe-needle U-100 1/2 mL 30 gauge x 5/16 Syrg  Commonly known as:  INSULIN SYRINGE  Use 3x/day     * insulin syringe-needle U-100 0.5 mL 31 gauge x 5/16 Syrg     * lancets Misc  To check BG 4 times daily, one touch verio meter. Dx code e11.65     * ONETOUCH DELICA LANCETS 33 gauge Misc  Generic drug:  lancets     levETIRAcetam 500 MG Tab  Commonly known as:  KEPPRA  TAKE ONE TABLET BY MOUTH TWICE DAILY     liraglutide 0.6 mg/0.1 mL (18 mg/3 mL) subq PNIJ 0.6 mg/0.1 mL (18 mg/3 mL) Pnij  Commonly known as:  VICTOZA 3-TOMASZ  Inject 1.8 mg into the skin once daily.     metFORMIN 500 MG 24 hr tablet  Commonly known as:  GLUCOPHAGE-XR  Take 2 tablets (1,000 mg total) by mouth 2 (two) times daily with meals.     omega-3 fatty acids-vitamin E 1,000 mg Cap  Commonly known as:  FISH OIL  Take 1 capsule by mouth 2 (two) times daily.     pen needle, diabetic 32 gauge x 5/32" Ndle  Commonly " "known as:  BD ULTRA-FINE ANA PEN NEEDLES  Inject twice daily     NOVOFINE PLUS 32 gauge x 1/6" Ndle  Generic drug:  pen needle, diabetic     sulfamethoxazole-trimethoprim 800-160mg 800-160 mg Tab  Commonly known as:  BACTRIM DS  Take every Monday, Wednesday, and Friday.  Infectious Disease will decide if this is needed in 3-4 months.     valsartan 320 MG tablet  Commonly known as:  DIOVAN  Take 1 tablet (320 mg total) by mouth once daily.     venlafaxine 75 MG 24 hr capsule  Commonly known as:  EFFEXOR-XR  Take 1 capsule (75 mg total) by mouth once daily.     vitamin D 1000 units Tab  Take 5 tablets (5,000 Units total) by mouth once daily.        * This list has 4 medication(s) that are the same as other medications prescribed for you. Read the directions carefully, and ask your doctor or other care provider to review them with you.            STOP taking these medications    ranitidine 150 MG tablet  Commonly known as:  ZANTAC            Indwelling Lines/Drains at time of discharge:   Lines/Drains/Airways          No matching active lines, drains, or airways          Time spent on the discharge of patient: 30 minutes  Patient was seen and examined on the date of discharge and determined to be suitable for discharge.         Geno Reyes NP  Department of Hospital Medicine  [facility]  "

## 2018-04-03 NOTE — PLAN OF CARE
04/03/18 1501   Discharge Reassessment   Assessment Type Discharge Planning Reassessment     Pt signed choice form for [facility]. Pt referral sent through [facility].

## 2018-04-03 NOTE — PROGRESS NOTES
[facility] Medicine  Progress Note    Patient Name: Bessy Nunez  MRN: [id number]  Patient Class: OP- Observation   Admission Date: 4/1/2018  Length of Stay: 0 days  Attending Physician: Ivett Orr MD  Primary Care Provider: Edgar Nova MD        Subjective:     Principal Problem:Transient alteration of awareness    HPI:  Pt presented to ER yesterday with c/o weakness and dizziness. He is not very reliable for hx. His son is at bedside. He reports that he woke the patient up yesterday and he was more confused that his baseline. He reports that the patient was on prednisone for vasculitis. Unsure how long he has been off. He reports that the pharmacy was unable to get a refill from neurology. He is confused at baseline, yesterday was more than his usual. This am he is better and back to his baseline since he had run out of steroids. He denies any LOC. Patient can not remeber how he was feeling to tell us if he is better. Son reports that he was not eating much.     Has a significant hx of vasculitis/stroke. [place] [facility]onc which has been treating him since his last admission.     Hospital Course:  He was kept over night for neuro consult and MRI. He shows no new lesions. Dr Manzanares saw the patent and would like him to be followed bu his neurologist at d/c. Already has appt scheduled for tomorrow.       Review of Systems :  Look more alert reports wants to go home.      Objective:     Vital Signs (Most Recent):  Temp: 97.9 °F (36.6 °C) (04/03/18 1114)  Pulse: 70 (04/03/18 1114)  Resp: 20 (04/03/18 1114)  BP: (!) 163/79 (04/03/18 1114)  SpO2: 95 % (04/03/18 1114) Vital Signs (24h Range):  Temp:  [96.3 °F (35.7 °C)-98.5 °F (36.9 °C)] 97.9 °F (36.6 °C)  Pulse:  [64-77] 70  Resp:  [16-20] 20  SpO2:  [94 %-98 %] 95 %  BP: (115-175)/(60-80) 163/79     Weight: 102.9 kg (226 lb 13.7 oz)  Body mass index is 32.55 kg/m².    Physical Exam   Constitutional: He  appears well-developed and well-nourished. No distress.   HENT:   Head: Normocephalic and atraumatic.   Right Ear: External ear normal.   Left Ear: External ear normal.   Eyes: Conjunctivae and EOM are normal. Pupils are equal, round, and reactive to light. Right eye exhibits no discharge. Left eye exhibits no discharge.   Neck: Neck supple. No tracheal deviation present.   Cardiovascular: Normal rate and regular rhythm.  Exam reveals no gallop and no friction rub.    No murmur heard.  Pulmonary/Chest: Effort normal and breath sounds normal. No respiratory distress. He has no wheezes. He has no rales.   Abdominal: Soft. Bowel sounds are normal. He exhibits no distension. There is no tenderness. There is no rebound and no guarding.   Musculoskeletal: He exhibits no edema.   Neurological: He is alert. No cranial nerve deficit.   Oriented to person and place  Psychiatric: He has a normal mood and affect. His behavior is normal.   Nursing note and vitals reviewed.        CRANIAL NERVES     CN III, IV, VI   Pupils are equal, round, and reactive to light.  Extraocular motions are normal.        Significant Labs:   CBC:     Recent Labs  Lab 04/01/18 1825 04/02/18  0549   WBC 2.01* 2.39*   HGB 11.1* 10.1*   HCT 32.2* 29.9*    212     CMP:     Recent Labs  Lab 04/01/18 1825 04/02/18  0549    144   K 3.7 3.4*    113*   CO2 23 22*   * 150*   BUN 13 11   CREATININE 1.0 0.9   CALCIUM 9.5 8.9   PROT 6.8 5.9*   ALBUMIN 3.8 3.3*   BILITOT 0.8 0.5   ALKPHOS 73 64   AST 18 17   ALT 16 14   ANIONGAP 11 9   EGFRNONAA >60 >60     Recent Labs  Lab 04/01/18 1825 04/02/18  1200   CPK 42  42  --   --    CPKMB 1.2  --   --    TROPONINI 0.013  < > 0.011   MB 2.9  --   --    < > = values in this interval not displayed.     Lab Results   Component Value Date    TSH 1.385 04/01/2018     Mag 1.9    A1C 5.7   phos 4.1  BNP 48    keppra level in process    Urinalysis  Recent Labs  Lab 04/02/18  0828   COLORU Yellow    SPECGRAV 1.020   PHUR 5.0   PROTEINUA Trace*   NITRITE Negative   LEUKOCYTESUR Negative   UROBILINOGEN Negative         Cardiac Markers:     Recent Labs  Lab 04/01/18  1825   BNP 48       Significant Imaging:     CXR The lungs are clear, with normal appearance of pulmonary vasculature and no pleural effusion or pneumothorax.    The cardiac silhouette is normal in size. The hilar and mediastinal contours are unremarkable.    Bones are intact.    Ct head Intracranial compartment:    Ventricles and sulci are normal in size for age without evidence of hydrocephalus. No extra-axial blood or fluid collections.    Chronic infarct identified LEFT cerebellar hemisphere with lacunar infarct LEFT globus pallidus region.  No CT findings of acute large territorial infarct.  Periventricular low attenuation consistent with chronic microvascular ischemic change.  No parenchymal mass, hemorrhage, edema or major vascular distribution infarct. If there is additional concern for acute ischemia, MRI with diffusion-weighted imaging could be obtained.    Atherosclerotic change at level of the carotids.    Skull/extracranial contents (limited evaluation): No fracture. Mastoid air cells and paranasal sinuses are essentially clear.      MRI  4/2 Age-appropriate generalized volume loss with scattered foci of T2/FLAIR signal abnormality within the supratentorial white matter and jose while nonspecific suggestive for moderate to severe degree of  chronic microvascular ischemic change.    No evidence for acute infarction or enhancing lesion.      4/1 ekg Normal sinus rhythm  Moderate voltage criteria for LVH, may be normal variant  Nonspecific T wave abnormality  Abnormal ECG  When compared with ECG of 20-NOV-2017 14:50,  No significant change was found    4/2 ekg Normal sinus rhythm  Moderate voltage criteria for LVH, may be normal variant  Inferior infarct ,age undetermined  T wave abnormality, consider lateral ischemia  Abnormal ECG  When  compared with ECG of 01-APR-2018 18:28,  Inferior infarct is now Present  Inverted T waves have replaced nonspecific T wave abnormality in Lateral leads    Assessment/Plan:      * Transient alteration of awareness    Woke up from nap disoriented--NO SYNCOPE reported by son who witnessed the event  Per son back to baseline now  Blood sugar was normal at that time  Checking keppra level  Cont telemetry but NSR since admit  Trending trop but thus far negative  Otherwise no metabolic cause found    Will have Dr. Leighton hsu to ensure no other imaging is needed but with stable CT head and recent stable MRA I don't feel strongly for imaging at this time. Did MRI nothing new    However, he is certainly not safe to discharge home alone. Wife and son both state they can not care for him at home. SW/CM working on placement. Pt refuses. 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 Today he was much more alert and oriented than yesterday. We discussed long term placement with wife,. Son and patient. Patient refuses.  has discussed that patient needs 24hr care. Info given for sitters, etc will resume home health as well upon d/c. F/u already made with Dr Alvarenga in am          Type 2 diabetes mellitus with diabetic polyneuropathy, with long-term current use of insulin    Cont home insulin  Son states was on victoza as outpatient but ran out, will refill at discharge  No reported h/o hypoglycemia, monitor here          Essential hypertension    Cont home diltiazem, valsartan, atenolol  Stop IVF            Mixed hyperlipidemia    Cont home atorvastatin          Major depressive disorder with single episode, in full remission    Cont  home effexor            VTE Risk Mitigation         Ordered     IP VTE HIGH RISK PATIENT  Once      04/01/18 2122     Place sequential compression device  Until discontinued      04/01/18 2122              Geno Reyes NP  Department of Hospital Medicine   [facility]

## 2018-04-04 NOTE — PLAN OF CARE
04/04/18 1310   Final Note   Assessment Type Final Discharge Note   Discharge Disposition Home-Health   What phone number can be called within the next 1-3 days to see how you are doing after discharge? [phone]   Hospital Follow Up  Appt(s) scheduled? Yes   Discharge plans and expectations educations in teach back method with documentation complete? Yes   Right Care Referral Info   Post Acute Recommendation Home-care   Referral Type Home-care   Facility Name [facility], State [place], [place]

## 2018-04-05 ENCOUNTER — TELEPHONE (OUTPATIENT)
Dept: FAMILY MEDICINE | Facility: CLINIC | Age: 58
End: 2018-04-05

## 2018-04-05 DIAGNOSIS — I10 ESSENTIAL HYPERTENSION: Chronic | ICD-10-CM

## 2018-04-05 RX ORDER — ATENOLOL 50 MG/1
TABLET ORAL
Qty: 30 TABLET | Refills: 7 | Status: CANCELLED | OUTPATIENT
Start: 2018-04-05

## 2018-04-05 NOTE — TELEPHONE ENCOUNTER
[facility]  She would like you to put in orders for a  to go out  and speech therapy.

## 2018-04-06 DIAGNOSIS — I10 ESSENTIAL HYPERTENSION: Chronic | ICD-10-CM

## 2018-04-06 RX ORDER — ATENOLOL 50 MG/1
50 TABLET ORAL DAILY
Qty: 90 TABLET | Refills: 0 | Status: SHIPPED | OUTPATIENT
Start: 2018-04-06 | End: 2018-06-12 | Stop reason: SDUPTHER

## 2018-04-06 NOTE — TELEPHONE ENCOUNTER
----- Message from Suzi Jones sent at 2018 10:11 AM CDT -----  Contact: WIFE   Bessy Nunez  MRN: [id number]  : 1960  PCP: Edgar Nova  Home Phone      [phone]  Work Phone      Not on file.  Mobile          [phone]      MESSAGE: NEEDS REFILL ON VICTOZA AT [facility] IN [place]  AND ATENOLOL AT [facility] IN [place]. PT IS COMPLETELY OUT. CAN SOMEONE FILL FOR HIM TODAY?    PHONE: [phone]    PHARMACY:

## 2018-04-09 ENCOUNTER — OFFICE VISIT (OUTPATIENT)
Dept: INTERNAL MEDICINE | Facility: CLINIC | Age: 58
End: 2018-04-09
Payer: COMMERCIAL

## 2018-04-09 ENCOUNTER — TELEPHONE (OUTPATIENT)
Dept: REHABILITATION | Facility: HOSPITAL | Age: 58
End: 2018-04-09

## 2018-04-09 VITALS
RESPIRATION RATE: 16 BRPM | HEIGHT: 71 IN | DIASTOLIC BLOOD PRESSURE: 80 MMHG | WEIGHT: 224.63 LBS | SYSTOLIC BLOOD PRESSURE: 114 MMHG | HEART RATE: 72 BPM | BODY MASS INDEX: 31.45 KG/M2

## 2018-04-09 DIAGNOSIS — I77.6 CNS VASCULITIS: ICD-10-CM

## 2018-04-09 DIAGNOSIS — I77.6 VASCULITIS: Primary | ICD-10-CM

## 2018-04-09 DIAGNOSIS — R53.81 DEBILITY: ICD-10-CM

## 2018-04-09 DIAGNOSIS — E11.42 TYPE 2 DIABETES MELLITUS WITH DIABETIC POLYNEUROPATHY, WITH LONG-TERM CURRENT USE OF INSULIN: ICD-10-CM

## 2018-04-09 DIAGNOSIS — Z79.4 TYPE 2 DIABETES MELLITUS WITH DIABETIC POLYNEUROPATHY, WITH LONG-TERM CURRENT USE OF INSULIN: ICD-10-CM

## 2018-04-09 DIAGNOSIS — G47.33 OSA (OBSTRUCTIVE SLEEP APNEA): ICD-10-CM

## 2018-04-09 DIAGNOSIS — I10 ESSENTIAL HYPERTENSION: ICD-10-CM

## 2018-04-09 DIAGNOSIS — E78.2 MIXED HYPERLIPIDEMIA: ICD-10-CM

## 2018-04-09 DIAGNOSIS — F32.5 MAJOR DEPRESSIVE DISORDER WITH SINGLE EPISODE, IN FULL REMISSION: Chronic | ICD-10-CM

## 2018-04-09 DIAGNOSIS — K76.0 NAFLD (NONALCOHOLIC FATTY LIVER DISEASE): ICD-10-CM

## 2018-04-09 DIAGNOSIS — E66.9 OBESITY (BMI 30-39.9): ICD-10-CM

## 2018-04-09 DIAGNOSIS — L30.9 DERMATITIS: ICD-10-CM

## 2018-04-09 PROCEDURE — 99214 OFFICE O/P EST MOD 30 MIN: CPT | Mod: S$GLB,,, | Performed by: NURSE PRACTITIONER

## 2018-04-09 PROCEDURE — 99999 PR PBB SHADOW E&M-EST. PATIENT-LVL V: CPT | Mod: PBBFAC,,, | Performed by: NURSE PRACTITIONER

## 2018-04-09 PROCEDURE — 3044F HG A1C LEVEL LT 7.0%: CPT | Mod: CPTII,S$GLB,, | Performed by: NURSE PRACTITIONER

## 2018-04-09 PROCEDURE — 3074F SYST BP LT 130 MM HG: CPT | Mod: CPTII,S$GLB,, | Performed by: NURSE PRACTITIONER

## 2018-04-09 PROCEDURE — 3079F DIAST BP 80-89 MM HG: CPT | Mod: CPTII,S$GLB,, | Performed by: NURSE PRACTITIONER

## 2018-04-09 RX ORDER — ATENOLOL 50 MG/1
TABLET ORAL
Qty: 30 TABLET | Refills: 7 | Status: SHIPPED | OUTPATIENT
Start: 2018-04-09 | End: 2018-04-09

## 2018-04-09 NOTE — TELEPHONE ENCOUNTER
Call returned to Bekah. She is agreeable to soon f/u appt with Beti. Appt scheduled for 4/12 at 11:30 with Beti. She is aware of date/time.

## 2018-04-09 NOTE — PROGRESS NOTES
Subjective:       Patient ID: Bessy Nunez is a 58 y.o. male.    Chief Complaint: Follow-up (Hospitalized / Syncope)    HPI: Pt presents to clinic today known to me for hospital f/u. He is with his wife today. She reports that she has not pursued placement. They did get home health set up last week. She is also expecting Speech therapy to start this week and pt/ot already started. She reports that she is already seeing a big difference in his strength.     She reports that she was not in hospital with Bessy because she had to remove herself from this situation for a brief period because things got hard but she is back as his primary care taker now. She also reports that she will be getting a sitter a couple times a week.     They also did not f/u with [facility] because they were rescheduled due to inpatient admission. They are due to see them this week and Friday chemo. .     keppra level 19.9    She has been checking his sugars since they got home. She reports that she forgot his logs but he has been doing really well with his sugars. Not having to use the sliding scale. She reports that it has a , lot to do with being off steroids but would like to get back on because he is less confused when on them.   Review of Systems   Constitutional: Negative for chills and fever.   HENT: Negative for congestion, postnasal drip and sore throat.    Eyes: Negative for photophobia.   Respiratory: Negative for chest tightness and shortness of breath.    Cardiovascular: Negative for chest pain.   Gastrointestinal: Negative for abdominal distention, abdominal pain, blood in stool and vomiting.   Genitourinary: Negative for dysuria, flank pain and hematuria.   Musculoskeletal: Negative for back pain.   Skin: Negative for pallor.   Neurological: Negative for dizziness, seizures, facial asymmetry, speech difficulty and numbness.   Hematological: Does not bruise/bleed easily.   Psychiatric/Behavioral: Positive for agitation, confusion  and decreased concentration. Negative for suicidal ideas. The patient is not nervous/anxious.        Objective:      Physical Exam   Constitutional: He appears well-developed and well-nourished.   HENT:   Head: Normocephalic and atraumatic.   Neck: No JVD present.   Cardiovascular: Normal rate, regular rhythm and normal heart sounds.    No murmur heard.  Pulmonary/Chest: Effort normal and breath sounds normal. No respiratory distress. He has no wheezes. He has no rales.   Abdominal: Soft. Bowel sounds are normal. There is no tenderness.   Musculoskeletal: He exhibits no edema.   Neurological: He is alert.   Skin: Skin is warm and dry.   Nursing note and vitals reviewed.      Assessment:       1. Vasculitis    2. Debility    3. Major depressive disorder with single episode, in full remission    4. Mixed hyperlipidemia    5. Essential hypertension    6. NAFLD (nonalcoholic fatty liver disease)    7. Obesity (BMI 30-39.9)    8.  JOHN with CPAP at night was not using it so they took it away, may need back in furture once less confused    9. Type 2 diabetes mellitus with diabetic polyneuropathy, with long-term current use of insulin    10. CNS vasculitis        Plan:   Bessy was seen today for follow-up.    Diagnoses and all orders for this visit:    Vasculitis  -     Ambulatory referral to Home Health    Debility  -     COMMODE FOR HOME USE    Major depressive disorder with single episode, in full remission   will reset up with psychiatry, doing better on effexor in place of zoloft  Mixed hyperlipidemia  Cont lipitor and asa  Essential hypertension  Well controlled cont diovan and atenolol   NAFLD (nonalcoholic fatty liver disease)  Needs wt loss  Obesity (BMI 30-39.9)  Needs wt loss   JOHN with CPAP at night was not using it so they took it away, may need back in furture once less confused    Type 2 diabetes mellitus with diabetic polyneuropathy, with long-term current use of insulin  Cont victoza, cont 20 of humalog  prior to meals, and 40 unots of basaglar at [facility]. Still on metformin and cont    CNS vasculitis  Cont f/u with Dr Bray and chemo on Friday. F/u here as needed. Cont home health, same meds and treatment. He is better oriented today but still not fully oriented. Knows recent person and place and holiday and president, day of week, but confused as to season and date.

## 2018-04-09 NOTE — TELEPHONE ENCOUNTER
----- Message from Mahnaz Crespo sent at 4/9/2018 10:17 AM CDT -----  Contact: Bekah (wife) @ [phone]  Pts wife says she is returning Charis's call.

## 2018-04-12 ENCOUNTER — OFFICE VISIT (OUTPATIENT)
Dept: NEUROLOGY | Facility: CLINIC | Age: 58
End: 2018-04-12
Payer: COMMERCIAL

## 2018-04-12 VITALS
BODY MASS INDEX: 39.27 KG/M2 | HEART RATE: 64 BPM | SYSTOLIC BLOOD PRESSURE: 139 MMHG | WEIGHT: 200 LBS | DIASTOLIC BLOOD PRESSURE: 84 MMHG | HEIGHT: 60 IN

## 2018-04-12 DIAGNOSIS — Z86.73 HISTORY OF COMPLETED STROKE: ICD-10-CM

## 2018-04-12 DIAGNOSIS — F09 COGNITIVE DYSFUNCTION: ICD-10-CM

## 2018-04-12 DIAGNOSIS — I77.6 CNS VASCULITIS: Primary | ICD-10-CM

## 2018-04-12 DIAGNOSIS — Z71.89 COUNSELING REGARDING GOALS OF CARE: ICD-10-CM

## 2018-04-12 DIAGNOSIS — Z79.899 HIGH RISK MEDICATION USE: ICD-10-CM

## 2018-04-12 DIAGNOSIS — Z29.89 PROPHYLACTIC IMMUNOTHERAPY: ICD-10-CM

## 2018-04-12 PROCEDURE — 3079F DIAST BP 80-89 MM HG: CPT | Mod: CPTII,S$GLB,, | Performed by: CLINICAL NURSE SPECIALIST

## 2018-04-12 PROCEDURE — 99999 PR PBB SHADOW E&M-EST. PATIENT-LVL III: CPT | Mod: PBBFAC,,, | Performed by: CLINICAL NURSE SPECIALIST

## 2018-04-12 PROCEDURE — 3075F SYST BP GE 130 - 139MM HG: CPT | Mod: CPTII,S$GLB,, | Performed by: CLINICAL NURSE SPECIALIST

## 2018-04-12 PROCEDURE — 99214 OFFICE O/P EST MOD 30 MIN: CPT | Mod: S$GLB,,, | Performed by: CLINICAL NURSE SPECIALIST

## 2018-04-12 NOTE — Clinical Note
He had EEG after your last visit with no focal findings and no evidence of seizures. He is still taking Keppra. No rec was made by Dr. Manzanares in the hospital. Think we should wean off?

## 2018-04-12 NOTE — PROGRESS NOTES
Subjective:       Patient ID: Bessy Nunez is a 58 y.o. male. He presents today for a routine clinic visit for CNS vasculitis; he is accompanied by his wife and son.  He was last seen in February 2018.       HPI  He was seen in the ER on 4/2 (brought by his son) with reports of weakness and dizziness, as well as worsened confusion. MRI brain in the ER did not show any acute infarction or enhancing lesion.     He is receiving Cytoxan monthly that is being managed by hem-onc. His wife feels that she notices a benefit for about a week or two after the infusion in terms of his confusion. He is more verbal during this time.   His wife states that, in general, he is able to follow directions and complete tasks/ADLs, but does not initiate a task.  He has been receiving home health for the past 2 weeks. He will be getting speech therapy at home, and he has been getting physical therapy at home.  His wife feels that his walking is getting better. He has not had any falls in the past few weeks.   He had one counseling session with Sharda and would like to continue this.  He switched insurances recently.   His wife is his primary caregiver.       Review of Systems    As above.   Social History     Social History    Marital status:      Spouse name: N/A    Number of children: N/A    Years of education: N/A     Occupational History    unemployed      Social History Main Topics    Smoking status: Never Smoker    Smokeless tobacco: Never Used    Alcohol use No    Drug use: No    Sexual activity: Not on file     Other Topics Concern    Not on file     Social History Narrative    No narrative on file     He lives with his wife.   He receives SSDI.   Objective:      Neurologic Exam    Physical Exam       25 foot timed walk: 11.2 seconds with rollator today; was 12.96 seconds at last visit with rollator; gait is steady with rollator; transfers easily  MS: continues to have flat affect; does not initiate much  conversation  Extraocular movements are intact.   Facial movements are normal. He has normal strength in upper and lower extremities. Rapid sequential movements are normal in the upper extremities. Vibratory sense is intact.  Reflexes are 2+ and symmetric throughout.      Romberg is positive, but only mild sway.   Finger to nose coordination is normal.   He is able to follow 3 step commands.     He knows the month and year, but not day of the week. He does not know the president of the [place]. He can spell WORLD forward and backward.     LABS    Lab Results   Component Value Date    WBC 2.39 (L) 04/02/2018    HGB 10.1 (L) 04/02/2018    HCT 29.9 (L) 04/02/2018    MCV 83 04/02/2018     04/02/2018     ANC=0.6    IMAGING:    Results for orders placed during the hospital encounter of 04/01/18   MRI Brain W WO Contrast    Narrative Procedure: MRI the brain with and without contrast.    Technique: Sagittal and axial T1, axial T2, axial FLAIR, axial gradient, axial diffusion, and axial, sagittal, and coronal postcontrast T1 images of the whole brain. 10.0 ml of Gadavist injected intravenously.    Comparison: 9/14/17    Findings: Age-appropriate generalized cerebral volume loss. There are scattered foci of  T2/FLAIR signal hyperintensity in the supratentorial white matter without corresponding diffusion signal abnormality or enhancement. These are nonspecific and suggestive for moderate/severe degree of chronic microvascular ischemic change.  Remote lacunar type infarctions are seen in the bilateral basal ganglia and bilateral cerebellar hemispheres.  There is no restricted diffusion to suggest acute infarction. No abnormal parenchymal enhancement.  A small developmental venous anomaly is seen in the right parieto-occipital region.  Ventricles normal in size and configuration without hydrocephalus. No midline shift or mass effect. No abnormal parenchymal gradient susceptibility. The major intracranial T2 flow-voids are  "present.    Impression  Age-appropriate generalized volume loss with scattered foci of T2/FLAIR signal abnormality within the supratentorial white matter and jose while nonspecific suggestive for moderate to severe degree of  chronic microvascular ischemic change.    No evidence for acute infarction or enhancing lesion.      Electronically signed by: Leida Garcia MD  Date:     04/02/18  Time:    14:41      EEG done 2/28/18 with the following findings: "INTERPRETATION:  Abnormal EEG due to the appearance of FIRDA seen several times during the waking record, which indicates a mild encephalopathy.  There were no focal findings and no evidence of seizures."    MRA from 2/28/18: "Relatively stable short segment regions multifocal stenosis involving the A2 segments of the ROMAIN, left superior cerebellar artery, and left P2 segment of the PCA. Incidental proximal basilar artery fenestration."   Assessment:     1. CNS Vasculitis  2. Cognitive dysfunction  His wife reports that Bessy has some improvement for about a week or two after his infusion. We will plan to increase his Cytoxan dose to 750mg/m2 and communicate with Dr. Goldstein regarding this.  He has infusion scheduled for tomorrow. Ideally, we'd like to see a more sustained improvement in his cognition with this dose.   Plan:       1. Continue monthly pulse Cytoxan. As above, will plan to increase dose to 750mg/m2.     2. Continue home health PT and ST    3. May D/C [place]. Will discuss with Dr. Love.     4. Will reschedule appt with Sharda Gonzalez LCSW for counseling    5. Follow up in clinic in 2 months.     Over 50% of this 35 minute visit was spent discussing the patien's diagnosis of CNS vasculitis, current symptoms, and plans for Cytoxan. The patient and his wife agree with the plan of care. I will see him back in 2 months.     Beti Boswell, [facility]-BC, MSCN    "

## 2018-04-13 ENCOUNTER — TELEPHONE (OUTPATIENT)
Dept: INTERNAL MEDICINE | Facility: CLINIC | Age: 58
End: 2018-04-13

## 2018-04-13 ENCOUNTER — INFUSION (OUTPATIENT)
Dept: INFUSION THERAPY | Facility: HOSPITAL | Age: 58
End: 2018-04-13
Attending: INTERNAL MEDICINE
Payer: COMMERCIAL

## 2018-04-13 ENCOUNTER — OFFICE VISIT (OUTPATIENT)
Dept: HEMATOLOGY/ONCOLOGY | Facility: CLINIC | Age: 58
End: 2018-04-13
Payer: COMMERCIAL

## 2018-04-13 ENCOUNTER — LAB VISIT (OUTPATIENT)
Dept: LAB | Facility: HOSPITAL | Age: 58
End: 2018-04-13
Attending: INTERNAL MEDICINE
Payer: COMMERCIAL

## 2018-04-13 VITALS
HEART RATE: 70 BPM | BODY MASS INDEX: 32.35 KG/M2 | DIASTOLIC BLOOD PRESSURE: 67 MMHG | TEMPERATURE: 97 F | SYSTOLIC BLOOD PRESSURE: 140 MMHG | RESPIRATION RATE: 18 BRPM | HEIGHT: 71 IN | OXYGEN SATURATION: 97 % | WEIGHT: 231.06 LBS

## 2018-04-13 VITALS
SYSTOLIC BLOOD PRESSURE: 115 MMHG | DIASTOLIC BLOOD PRESSURE: 68 MMHG | HEART RATE: 69 BPM | RESPIRATION RATE: 18 BRPM | TEMPERATURE: 99 F

## 2018-04-13 DIAGNOSIS — T45.1X5A CHEMOTHERAPY-INDUCED NEUTROPENIA: ICD-10-CM

## 2018-04-13 DIAGNOSIS — Z51.11 ENCOUNTER FOR CHEMOTHERAPY MANAGEMENT: ICD-10-CM

## 2018-04-13 DIAGNOSIS — T45.1X5A ANEMIA ASSOCIATED WITH CHEMOTHERAPY: ICD-10-CM

## 2018-04-13 DIAGNOSIS — D69.6 THROMBOCYTOPENIA: ICD-10-CM

## 2018-04-13 DIAGNOSIS — I77.6 CNS VASCULITIS: Primary | ICD-10-CM

## 2018-04-13 DIAGNOSIS — D64.81 ANEMIA ASSOCIATED WITH CHEMOTHERAPY: ICD-10-CM

## 2018-04-13 DIAGNOSIS — D70.1 CHEMOTHERAPY-INDUCED NEUTROPENIA: ICD-10-CM

## 2018-04-13 LAB
ALBUMIN SERPL BCP-MCNC: 3.5 G/DL
ALP SERPL-CCNC: 73 U/L
ALT SERPL W/O P-5'-P-CCNC: 20 U/L
ANION GAP SERPL CALC-SCNC: 12 MMOL/L
AST SERPL-CCNC: 29 U/L
BASOPHILS # BLD AUTO: 0.06 K/UL
BASOPHILS NFR BLD: 0.8 %
BILIRUB SERPL-MCNC: 0.6 MG/DL
BUN SERPL-MCNC: 14 MG/DL
CALCIUM SERPL-MCNC: 9.3 MG/DL
CHLORIDE SERPL-SCNC: 107 MMOL/L
CO2 SERPL-SCNC: 20 MMOL/L
CREAT SERPL-MCNC: 0.9 MG/DL
DIFFERENTIAL METHOD: ABNORMAL
EOSINOPHIL # BLD AUTO: 0.1 K/UL
EOSINOPHIL NFR BLD: 1.9 %
ERYTHROCYTE [DISTWIDTH] IN BLOOD BY AUTOMATED COUNT: 15 %
EST. GFR  (AFRICAN AMERICAN): >60 ML/MIN/1.73 M^2
EST. GFR  (NON AFRICAN AMERICAN): >60 ML/MIN/1.73 M^2
GLUCOSE SERPL-MCNC: 188 MG/DL
HCT VFR BLD AUTO: 34 %
HGB BLD-MCNC: 11.4 G/DL
IMM GRANULOCYTES # BLD AUTO: 0.05 K/UL
IMM GRANULOCYTES NFR BLD AUTO: 0.7 %
LYMPHOCYTES # BLD AUTO: 1.3 K/UL
LYMPHOCYTES NFR BLD: 18.2 %
MCH RBC QN AUTO: 27.9 PG
MCHC RBC AUTO-ENTMCNC: 33.5 G/DL
MCV RBC AUTO: 83 FL
MONOCYTES # BLD AUTO: 0.6 K/UL
MONOCYTES NFR BLD: 8.8 %
NEUTROPHILS # BLD AUTO: 5 K/UL
NEUTROPHILS NFR BLD: 69.6 %
NRBC BLD-RTO: 0 /100 WBC
PLATELET # BLD AUTO: 74 K/UL
PMV BLD AUTO: 10.6 FL
POTASSIUM SERPL-SCNC: 3.7 MMOL/L
PROT SERPL-MCNC: 7 G/DL
RBC # BLD AUTO: 4.08 M/UL
SODIUM SERPL-SCNC: 139 MMOL/L
WBC # BLD AUTO: 7.18 K/UL

## 2018-04-13 PROCEDURE — 25000003 PHARM REV CODE 250: Performed by: INTERNAL MEDICINE

## 2018-04-13 PROCEDURE — 85025 COMPLETE CBC W/AUTO DIFF WBC: CPT

## 2018-04-13 PROCEDURE — 96413 CHEMO IV INFUSION 1 HR: CPT

## 2018-04-13 PROCEDURE — 36415 COLL VENOUS BLD VENIPUNCTURE: CPT

## 2018-04-13 PROCEDURE — 96367 TX/PROPH/DG ADDL SEQ IV INF: CPT

## 2018-04-13 PROCEDURE — 63600175 PHARM REV CODE 636 W HCPCS: Mod: JG | Performed by: INTERNAL MEDICINE

## 2018-04-13 PROCEDURE — 99999 PR PBB SHADOW E&M-EST. PATIENT-LVL III: CPT | Mod: PBBFAC,,, | Performed by: NURSE PRACTITIONER

## 2018-04-13 PROCEDURE — 96411 CHEMO IV PUSH ADDL DRUG: CPT

## 2018-04-13 PROCEDURE — 99215 OFFICE O/P EST HI 40 MIN: CPT | Mod: S$GLB,,, | Performed by: NURSE PRACTITIONER

## 2018-04-13 PROCEDURE — 80053 COMPREHEN METABOLIC PANEL: CPT

## 2018-04-13 RX ORDER — SODIUM CHLORIDE 0.9 % (FLUSH) 0.9 %
10 SYRINGE (ML) INJECTION
Status: CANCELLED | OUTPATIENT
Start: 2018-04-13

## 2018-04-13 RX ORDER — HEPARIN 100 UNIT/ML
500 SYRINGE INTRAVENOUS
Status: CANCELLED | OUTPATIENT
Start: 2018-04-13

## 2018-04-13 RX ORDER — SODIUM CHLORIDE 0.9 % (FLUSH) 0.9 %
10 SYRINGE (ML) INJECTION
Status: DISCONTINUED | OUTPATIENT
Start: 2018-04-13 | End: 2018-04-13 | Stop reason: HOSPADM

## 2018-04-13 RX ADMIN — DEXAMETHASONE SODIUM PHOSPHATE: 4 INJECTION, SOLUTION INTRAMUSCULAR; INTRAVENOUS at 01:04

## 2018-04-13 RX ADMIN — MESNA 1688 MG: 100 INJECTION, SOLUTION INTRAVENOUS at 01:04

## 2018-04-13 RX ADMIN — CYCLOPHOSPHAMIDE 1690 MG: 1 INJECTION, POWDER, FOR SOLUTION INTRAVENOUS; ORAL at 01:04

## 2018-04-13 NOTE — Clinical Note
Labs and Appt with Dr. Goldstein in 4 weeks prior to chemo. Chair for chemo in 4 weeks. Labs: CBC, CMP, T&S. Also needs same labs in 2 weeks without visit.

## 2018-04-13 NOTE — PLAN OF CARE
Problem: Patient Care Overview  Goal: Plan of Care Review  Outcome: Ongoing (interventions implemented as appropriate)  Patient tolerated cytoxan well today. Very hard piv stick, took multiple attempts, patient tolerated well though. Verbalized understanding to call MD for any questions/concerns. PIV removed, catheter tip intact. AVS given. Discharged home with son by side.

## 2018-04-13 NOTE — TELEPHONE ENCOUNTER
"[employer] represenative called to state patient would not be able to get his Humalog insulin due to it needed a PA. Rep was very rude and when I attempted to say ok thank you. He said wait I need to know what you are going to do. I stated "I am going to call [facility] and try to get PA done. This patient is out of insulin.       PA started for Humalog with [facility] case ID [id number]. Rep states she will fax cover my meds PA for Victioza.       Samples given for Humalog    Spoke to [facility], Rx sent to them for PA processing. Bekah notified states she will switch patients pharmacy there. Bekah notified                        "

## 2018-04-13 NOTE — TELEPHONE ENCOUNTER
----- Message from Kelley Cabrera sent at 2018 10:09 AM CDT -----  Contact: pt's wife Bekah Nunez  MRN: [id number]  : 1960  PCP: Geno Reyes  Home Phone      [phone]  Work Phone      Not on file.  Mobile          [phone]      MESSAGE:  Pt requesting refill on Victoza and Humalog. Says the Victoza will require a prior authorization. Please advise.  PHONE:  [phone]

## 2018-04-13 NOTE — PROGRESS NOTES
SECTION OF HEMATOLOGY AND BONE MARROW TRANSPLANT  New Patient Visit   04/13/2018  Referred by:  No ref. provider found  Referred for:     CHIEF COMPLAINT:   Chief Complaint   Patient presents with    CNS vasculitis    Results       HISTORY OF PRESENT ILLNESS:   Presents today to clinic with son for next dose (#9) of Cytoxan for CNS Vasculitis. He states he has been tolerating the previous cycles well. Patient states his mental awareness has improved since starting chemo. He states he is more talkative and communicating better since starting chemo.   No headaches, no diplopia. Denies fevers, chills, N/V/D/C, chest pain, SOB, or bleeding. Denies hematuria, epistaxis, melena, or BRBPR. Pt reports good appetite. Son reports that pt is not drinking enough water. Pt recently seen by Dr. Boswell with neurology who would like to increase Cytoxan dose to 750mg/m2 today due to good response with treatment. Son reports that his father has had multiple falls over the past year. Most recently he fell in 2/2018. He has a walker at home he does not use. Educated the importance of using his walker, especially now that he is on chemotherapy with a low platelet count.     PAST MEDICAL HISTORY:   Past Medical History:   Diagnosis Date    CNS vasculitis 6/2/2017    Follows with Dr. Love By mri.  Several active lesions, many old. 5/13: MRA brain/neck, MRI brain w/wo contrast show no vascular occlusion, multiple foci of hyperintensity in deep cerebral periventricular white matter in pattern of demyelinating process.  5/17: MRI spine performed, no spinal cord lesions. Hospitalization 6/2017. EEG was performed negative for seizures.  Repeat MRI showing new lesion, question whether this was demyelination versus CVA. 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 Findings compatible with a small acute lacunar infarct adjacent to the left frontal horn.  Nonspecific enhancement just inferior to this region and extending into the left basal ganglia, as well as within the inferior aspect of the left cerebellum.  No evidence of a focal mass. 2.  Extensive increased signal intensity involving the periventricular white matter compatible with demyelinating disease versus vasculitis. 3.  Clinical correlation and followup recommended. 4.  This reportedly flattened in the EPIC and the corpus callosum medical record system.    Mixed hyperlipidemia 11/9/2012    NAFLD (nonalcoholic fatty liver disease) 10/11/2013    CHASE (nonalcoholic steatohepatitis)     Obesity     Stroke     Type 2 diabetes mellitus with diabetic polyneuropathy, with long-term current use of insulin 5/14/2017    Type 2 diabetes mellitus with hyperglycemia, with long-term current use of insulin 10/11/2013       PAST SURGICAL HISTORY:   Past Surgical History:   Procedure Laterality Date    SKIN CANCER EXCISION         PAST SOCIAL HISTORY:   reports that he has never smoked. He has never used smokeless tobacco. He reports that he does not drink alcohol or use drugs.    FAMILY HISTORY:  Family History   Problem Relation Age of Onset    Heart disease Mother     Hypertension Mother     Heart failure Mother     Cancer Father      lung    Diabetes Maternal Aunt        CURRENT MEDICATIONS:   Current Outpatient Prescriptions   Medication Sig    aspirin (ECOTRIN) 81 MG EC tablet Take 81 mg by mouth once daily.    atenolol (TENORMIN) 50 MG tablet Take 1 tablet (50 mg total) by mouth once daily.    atorvastatin (LIPITOR) 40 MG tablet TAKE ONE TABLET BY MOUTH ONCE DAILY    blood sugar diagnostic Strp To check BG 4 times daily, to use with insurance preferred meter    blood-glucose meter kit To check BG 4 times daily, one touch verio meter. Dx code e11.65    calcium carbonate  "(OS-DONNA) 500 mg calcium (1,250 mg) tablet Take 2 tablets (1,000 mg total) by mouth once.    diltiaZEM (DILACOR XR) 240 MG CDCR TAKE ONE CAPSULE BY MOUTH ONCE DAILY    insulin glargine (BASAGLAR KWIKPEN U-100 INSULIN) 100 unit/mL (3 mL) InPn pen Inject 40 Units into the skin every evening.    insulin lispro (HUMALOG) 100 unit/mL injection Inject 20 units before meals    insulin needles, disposable, (BD ULTRA-FINE ANA PEN NEEDLES) 32 x 5/32 " Ndle Inject twice daily    insulin syringe-needle U-100 (INSULIN SYRINGE) 1/2 mL 30 gauge x 5/16 Syrg Use 3x/day    insulin syringe-needle U-100 0.5 mL 31 gauge x 5/16 Syrg     lancets Misc To check BG 4 times daily, one touch verio meter. Dx code e11.65    levETIRAcetam (KEPPRA) 500 MG Tab TAKE ONE TABLET BY MOUTH TWICE DAILY    liraglutide 0.6 mg/0.1 mL, 18 mg/3 mL, subq PNIJ (VICTOZA 3-TOMASZ) 0.6 mg/0.1 mL (18 mg/3 mL) PnIj Inject 1.8 mg into the skin once daily.    metformin (GLUCOPHAGE-XR) 500 MG 24 hr tablet Take 2 tablets (1,000 mg total) by mouth 2 (two) times daily with meals.    NOVOFINE PLUS 32 gauge x 1/6" Ndle     omega-3 fatty acids-vitamin E (FISH OIL) 1,000 mg Cap Take 1 capsule by mouth 2 (two) times daily.    ONETOUCH DELICA LANCETS 33 gauge Misc     sulfamethoxazole-trimethoprim 800-160mg (BACTRIM DS) 800-160 mg Tab Take every Monday, Wednesday, and Friday.  Infectious Disease will decide if this is needed in 3-4 months.    valsartan (DIOVAN) 320 MG tablet Take 1 tablet (320 mg total) by mouth once daily.    venlafaxine (EFFEXOR-XR) 75 MG 24 hr capsule Take 1 capsule (75 mg total) by mouth once daily.    vitamin D 1000 units Tab Take 5 tablets (5,000 Units total) by mouth once daily.     No current facility-administered medications for this visit.      Facility-Administered Medications Ordered in Other Visits   Medication    sodium chloride 0.9% flush 10 mL     ALLERGIES:   Review of patient's allergies indicates:  No Known Allergies    REVIEW " OF SYSTEMS:   General ROS: Positive for fatigue.   Psychological ROS: Positive for confusion.   Ophthalmic ROS: negative  ENT ROS: negative  Allergy and Immunology ROS: negative  Hematological and Lymphatic ROS: negative  Endocrine ROS: negative  Respiratory ROS: negative  Cardiovascular ROS: negative  Gastrointestinal ROS: negative  Genito-Urinary ROS: negative  Musculoskeletal ROS: negative  Neurological ROS: see HPI  Dermatological ROS: negative    PHYSICAL EXAM:   Vitals:    04/13/18 1121   BP: (!) 140/67   Pulse: 70   Resp: 18   Temp: 97 °F (36.1 °C)       General - well developed, well nourished, no apparent distress  Head & Face - no sinus tenderness  Eyes - normal conjunctivae and lids   ENT - normal external auditory canals  Chest and Lung - normal respiratory effort, clear to auscultation bilaterally   Cardiovascular - RRR with no MGR, normal S1 and S2; no pedal edema  Abdomen -  soft, nontender, no palpable hepatomegaly or splenomegaly  Lymph - no palpable lymphadenopathy  Extremities - unremarkable nails and digits  Heme - no bruising, petechiae, pallor  Skin - no rashes or lesions  Psych - Normal mood and affect. Confusion noted.      ECOG Performance Status: (foot note - ECOG PS provided by Eastern Cooperative Oncology Group) 1 - Symptomatic but completely ambulatory    Karnofsky Performance Score:  70%- Cares for Self: Unable to Carry on Normal Activity or Active Work  DATA:   Lab Results   Component Value Date    WBC 7.18 04/13/2018    HGB 11.4 (L) 04/13/2018    HCT 34.0 (L) 04/13/2018    MCV 83 04/13/2018    PLT 74 (L) 04/13/2018     Gran # (ANC)   Date Value Ref Range Status   04/13/2018 5.0 1.8 - 7.7 K/uL Final     Gran%   Date Value Ref Range Status   04/13/2018 69.6 38.0 - 73.0 % Final     CMP  Sodium   Date Value Ref Range Status   04/13/2018 139 136 - 145 mmol/L Final     Potassium   Date Value Ref Range Status   04/13/2018 3.7 3.5 - 5.1 mmol/L Final     Chloride   Date Value Ref Range Status    04/13/2018 107 95 - 110 mmol/L Final     CO2   Date Value Ref Range Status   04/13/2018 20 (L) 23 - 29 mmol/L Final     Glucose   Date Value Ref Range Status   04/13/2018 188 (H) 70 - 110 mg/dL Final     BUN, Bld   Date Value Ref Range Status   04/13/2018 14 6 - 20 mg/dL Final     Creatinine   Date Value Ref Range Status   04/13/2018 0.9 0.5 - 1.4 mg/dL Final     Calcium   Date Value Ref Range Status   04/13/2018 9.3 8.7 - 10.5 mg/dL Final     Total Protein   Date Value Ref Range Status   04/13/2018 7.0 6.0 - 8.4 g/dL Final     Albumin   Date Value Ref Range Status   04/13/2018 3.5 3.5 - 5.2 g/dL Final   10/11/2013 4.3 3.6 - 5.1 g/dL Final     Comment:     @ Test Performed By:  [employer] [facility] AMELIA Almazan M.D.,   [address] [place] [id number]  [facility] #[id number]     Total Bilirubin   Date Value Ref Range Status   04/13/2018 0.6 0.1 - 1.0 mg/dL Final     Comment:     For infants and newborns, interpretation of results should be based  on gestational age, weight and in agreement with clinical  observations.  Premature Infant recommended reference ranges:  Up to 24 hours.............<8.0 mg/dL  Up to 48 hours............<12.0 mg/dL  3-5 days..................<15.0 mg/dL  6-29 days.................<15.0 mg/dL       Alkaline Phosphatase   Date Value Ref Range Status   04/13/2018 73 55 - 135 U/L Final     AST   Date Value Ref Range Status   04/13/2018 29 10 - 40 U/L Final     Comment:     *Result may be interfered by visible hemolysis     ALT   Date Value Ref Range Status   04/13/2018 20 10 - 44 U/L Final     Anion Gap   Date Value Ref Range Status   04/13/2018 12 8 - 16 mmol/L Final     eGFR if    Date Value Ref Range Status   04/13/2018 >60.0 >60 mL/min/1.73 m^2 Final     eGFR if non    Date Value Ref Range Status   04/13/2018 >60.0 >60 mL/min/1.73 m^2 Final     Comment:     Calculation used to obtain the estimated glomerular  filtration  rate (eGFR) is the CKD-EPI equation.            ASSESSMENT AND PLAN:   Encounter Diagnoses   Name Primary?    CNS vasculitis Yes    Encounter for chemotherapy management     Anemia associated with chemotherapy     Thrombocytopenia      -proceed with dose #9 Cytoxan increased to 750mg/m2 today with mesna support (mesna dose also increased) for CNS vasculitis, protocol per Dr. Love  -per neurology, plan for approximate 1 year of monthly therapy  -No contraindication to proceed with CTX today, WBC WNL, anemia stable, thrombocytopenia stable without bleeding. Creatinine and liver enzymes wnl.  - Transfuse for Hgb<7 and Plt <10k  - Pt educated to notify us of any bleeding or fevers or S/S infection. Educated on the importance of using walker while platelet count is low. Educated if he falls he could hit head and have a brain hemorrhage with low platelet count. Verbalizes understanding.     Follow Up:  Cbc, cmp, type and screen lab only in 2 weeks  -same labs, Nurse practioner appt, chair for #10 cytoxan  infusion in 4 weeks     Plan of care discussed with collaborating physician, Dr. Angelita Escoto, DNP, FNP  Hematology/Bone Marrow Transplant

## 2018-04-16 ENCOUNTER — TELEPHONE (OUTPATIENT)
Dept: INTERNAL MEDICINE | Facility: CLINIC | Age: 58
End: 2018-04-16

## 2018-04-16 NOTE — TELEPHONE ENCOUNTER
----- Message from Kelley Cabrera sent at 2018 10:50 AM CDT -----  Contact: pt's wife Bekah Nunez  MRN: [id number]  : 1960  PCP: Geno Reyes  Home Phone      [phone]  Work Phone      Not on file.  Mobile          [phone]      MESSAGE:  Says she just spoke to pt's insurance and the Humalog needs a prior auth as well. Please advise.  PHONE:  [phone]

## 2018-04-16 NOTE — TELEPHONE ENCOUNTER
Anaid with [facility] states that Victoza does not require a PA. The cost with insurance is $166. Please advise, thank you!!!        [facility]

## 2018-04-16 NOTE — TELEPHONE ENCOUNTER
----- Message from Suzi Jones sent at 2018  1:54 PM CDT -----  Contact: JAMES  / [facility]   Bessy Nunez  MRN: [id number]  : 1960  PCP: Geno Reyes  Home Phone      [phone]  Work Phone      Not on file.  Mobile          [phone]      MESSAGE: SAID TO LET JUAN CARLOS KNOW THAT THEY HAVE BEGUN PA ON HUMALOG AND VICTOZA IS $166.     PHONE: [phone]

## 2018-04-17 ENCOUNTER — TELEPHONE (OUTPATIENT)
Dept: INTERNAL MEDICINE | Facility: CLINIC | Age: 58
End: 2018-04-17

## 2018-04-17 NOTE — TELEPHONE ENCOUNTER
Spoke to Bekah, states she will finish sample of Victoza given. She thinks he is doing well on it. She will call us back and let us know if she wants to continue or not. She is aware of the cost per month.

## 2018-04-17 NOTE — TELEPHONE ENCOUNTER
----- Message from Suzi Jones sent at 2018 12:14 PM CDT -----  Contact: ARLINE / WIFE  Bessy Nunze  MRN: [id number]  : 1960  PCP: Geno Reyes  Home Phone      [phone]  Work Phone      Not on file.  [place]          [phone]      MESSAGE: RETURNING A CALL FROM JUAN CARLOS    PHONE: [phone]

## 2018-04-19 DIAGNOSIS — I10 ESSENTIAL HYPERTENSION: Chronic | ICD-10-CM

## 2018-04-20 ENCOUNTER — TELEPHONE (OUTPATIENT)
Dept: PSYCHIATRY | Facility: CLINIC | Age: 58
End: 2018-04-20

## 2018-04-20 NOTE — TELEPHONE ENCOUNTER
Phoned to reschedule pt's counseling appointment.  Spoke with wife and scheduled for Tuesday at 2pm.    ----- Message from GABRIEL Somers, CNS sent at 4/12/2018 12:10 PM CDT -----  Sharda Bessy has new insurance now and would like to reschedule his counseling sessions.

## 2018-04-22 RX ORDER — VALSARTAN 320 MG/1
TABLET ORAL
Qty: 30 TABLET | Refills: 11 | Status: SHIPPED | OUTPATIENT
Start: 2018-04-22 | End: 2018-08-01

## 2018-04-23 DIAGNOSIS — F32.A DEPRESSION, UNSPECIFIED DEPRESSION TYPE: ICD-10-CM

## 2018-04-23 RX ORDER — VENLAFAXINE HYDROCHLORIDE 75 MG/1
CAPSULE, EXTENDED RELEASE ORAL
Qty: 30 CAPSULE | Refills: 1 | Status: SHIPPED | OUTPATIENT
Start: 2018-04-23 | End: 2018-07-02 | Stop reason: SDUPTHER

## 2018-04-24 ENCOUNTER — OFFICE VISIT (OUTPATIENT)
Dept: PSYCHIATRY | Facility: CLINIC | Age: 58
End: 2018-04-24
Payer: COMMERCIAL

## 2018-04-24 DIAGNOSIS — F02.80 MAJOR NEUROCOGNITIVE DISORDER DUE TO ANOTHER MEDICAL CONDITION: ICD-10-CM

## 2018-04-24 DIAGNOSIS — Z63.9 FAMILY DISTRESS: ICD-10-CM

## 2018-04-24 DIAGNOSIS — F32.A DEPRESSIVE DISORDER: Primary | ICD-10-CM

## 2018-04-24 PROCEDURE — 90834 PSYTX W PT 45 MINUTES: CPT | Mod: S$GLB,,, | Performed by: SOCIAL WORKER

## 2018-04-24 SDOH — SOCIAL DETERMINANTS OF HEALTH (SDOH): PROBLEM RELATED TO PRIMARY SUPPORT GROUP, UNSPECIFIED: Z63.9

## 2018-04-25 ENCOUNTER — TELEPHONE (OUTPATIENT)
Dept: HEMATOLOGY/ONCOLOGY | Facility: CLINIC | Age: 58
End: 2018-04-25

## 2018-04-25 ENCOUNTER — TELEPHONE (OUTPATIENT)
Dept: NEUROLOGY | Facility: CLINIC | Age: 58
End: 2018-04-25

## 2018-04-25 NOTE — TELEPHONE ENCOUNTER
----- Message from Onel Pandya sent at 4/25/2018  1:10 PM CDT -----  Contact: Pt  Pt would like to be called back regarding blood work.    Pt can be reached at [phone].    Thank You.

## 2018-04-25 NOTE — TELEPHONE ENCOUNTER
----- Message from Mya Trinidad sent at 4/25/2018  1:03 PM CDT -----  Contact: Jess with home health  Jess calling to find out if labs can be drawn in home       Jess call back number [phone]  Return call at 205pm on 04/25/18, left message to have Jess return my call.  Yamileth
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
## 2018-04-27 ENCOUNTER — TELEPHONE (OUTPATIENT)
Dept: HEMATOLOGY/ONCOLOGY | Facility: CLINIC | Age: 58
End: 2018-04-27

## 2018-04-27 ENCOUNTER — LAB VISIT (OUTPATIENT)
Dept: LAB | Facility: HOSPITAL | Age: 58
End: 2018-04-27
Payer: COMMERCIAL

## 2018-04-27 DIAGNOSIS — E11.65 TYPE 2 DIABETES MELLITUS WITH HYPERGLYCEMIA, WITH LONG-TERM CURRENT USE OF INSULIN: ICD-10-CM

## 2018-04-27 DIAGNOSIS — I77.6 CNS VASCULITIS: ICD-10-CM

## 2018-04-27 DIAGNOSIS — Z79.4 TYPE 2 DIABETES MELLITUS WITH HYPERGLYCEMIA, WITH LONG-TERM CURRENT USE OF INSULIN: ICD-10-CM

## 2018-04-27 LAB
ABO + RH BLD: NORMAL
ALBUMIN SERPL BCP-MCNC: 3.7 G/DL
ALP SERPL-CCNC: 80 U/L
ALT SERPL W/O P-5'-P-CCNC: 15 U/L
ANION GAP SERPL CALC-SCNC: 9 MMOL/L
ANISOCYTOSIS BLD QL SMEAR: SLIGHT
AST SERPL-CCNC: 16 U/L
BASOPHILS # BLD AUTO: ABNORMAL K/UL
BASOPHILS NFR BLD: 4 %
BILIRUB SERPL-MCNC: 0.7 MG/DL
BLD GP AB SCN CELLS X3 SERPL QL: NORMAL
BUN SERPL-MCNC: 11 MG/DL
CALCIUM SERPL-MCNC: 9.5 MG/DL
CHLORIDE SERPL-SCNC: 108 MMOL/L
CO2 SERPL-SCNC: 24 MMOL/L
CREAT SERPL-MCNC: 0.9 MG/DL
DIFFERENTIAL METHOD: ABNORMAL
EOSINOPHIL # BLD AUTO: ABNORMAL K/UL
EOSINOPHIL NFR BLD: 6 %
ERYTHROCYTE [DISTWIDTH] IN BLOOD BY AUTOMATED COUNT: 14.4 %
EST. GFR  (AFRICAN AMERICAN): >60 ML/MIN/1.73 M^2
EST. GFR  (NON AFRICAN AMERICAN): >60 ML/MIN/1.73 M^2
GLUCOSE SERPL-MCNC: 172 MG/DL
HCT VFR BLD AUTO: 33.2 %
HGB BLD-MCNC: 11.2 G/DL
IMM GRANULOCYTES # BLD AUTO: ABNORMAL K/UL
IMM GRANULOCYTES NFR BLD AUTO: ABNORMAL %
LYMPHOCYTES # BLD AUTO: ABNORMAL K/UL
LYMPHOCYTES NFR BLD: 56 %
MCH RBC QN AUTO: 27.7 PG
MCHC RBC AUTO-ENTMCNC: 33.7 G/DL
MCV RBC AUTO: 82 FL
MONOCYTES # BLD AUTO: ABNORMAL K/UL
MONOCYTES NFR BLD: 28 %
NEUTROPHILS NFR BLD: 6 %
NRBC BLD-RTO: 0 /100 WBC
OVALOCYTES BLD QL SMEAR: ABNORMAL
PLATELET # BLD AUTO: 198 K/UL
PLATELET BLD QL SMEAR: ABNORMAL
PMV BLD AUTO: 8.7 FL
POIKILOCYTOSIS BLD QL SMEAR: SLIGHT
POTASSIUM SERPL-SCNC: 4.1 MMOL/L
PROT SERPL-MCNC: 6.9 G/DL
RBC # BLD AUTO: 4.04 M/UL
SODIUM SERPL-SCNC: 141 MMOL/L
WBC # BLD AUTO: 1.13 K/UL

## 2018-04-27 PROCEDURE — 80053 COMPREHEN METABOLIC PANEL: CPT

## 2018-04-27 PROCEDURE — 86850 RBC ANTIBODY SCREEN: CPT

## 2018-04-27 PROCEDURE — 36415 COLL VENOUS BLD VENIPUNCTURE: CPT

## 2018-04-27 PROCEDURE — 85027 COMPLETE CBC AUTOMATED: CPT

## 2018-04-27 PROCEDURE — 85007 BL SMEAR W/DIFF WBC COUNT: CPT

## 2018-04-30 RX ORDER — SERTRALINE HYDROCHLORIDE 100 MG/1
TABLET, FILM COATED ORAL
Qty: 45 TABLET | Refills: 11 | OUTPATIENT
Start: 2018-04-30

## 2018-04-30 RX ORDER — METFORMIN HYDROCHLORIDE 500 MG/1
TABLET, EXTENDED RELEASE ORAL
Qty: 360 TABLET | Refills: 1 | Status: SHIPPED | OUTPATIENT
Start: 2018-04-30 | End: 2018-08-01 | Stop reason: SDUPTHER

## 2018-04-30 RX ORDER — INSULIN ASPART 100 [IU]/ML
20 INJECTION, SOLUTION INTRAVENOUS; SUBCUTANEOUS
Status: DISCONTINUED | OUTPATIENT
Start: 2018-04-30 | End: 2018-05-10

## 2018-05-02 ENCOUNTER — TELEPHONE (OUTPATIENT)
Dept: ADMINISTRATIVE | Facility: CLINIC | Age: 58
End: 2018-05-02

## 2018-05-02 ENCOUNTER — OFFICE VISIT (OUTPATIENT)
Dept: PSYCHIATRY | Facility: CLINIC | Age: 58
End: 2018-05-02
Payer: COMMERCIAL

## 2018-05-02 DIAGNOSIS — F02.818 MAJOR NEUROCOGNITIVE DISORDER DUE TO ANOTHER MEDICAL CONDITION WITH BEHAVIORAL DISTURBANCE: ICD-10-CM

## 2018-05-02 DIAGNOSIS — F32.A DEPRESSIVE DISORDER: Primary | ICD-10-CM

## 2018-05-02 DIAGNOSIS — Z63.8 FAMILY CONFLICT: ICD-10-CM

## 2018-05-02 PROCEDURE — 90846 FAMILY PSYTX W/O PT 50 MIN: CPT | Mod: S$GLB,,, | Performed by: SOCIAL WORKER

## 2018-05-02 SDOH — SOCIAL DETERMINANTS OF HEALTH (SDOH): OTHER SPECIFIED PROBLEMS RELATED TO PRIMARY SUPPORT GROUP: Z63.8

## 2018-05-02 NOTE — PROGRESS NOTES
Family Psychotherapy (PhD/LCSW)    5/2/2018    Site: [facility]    Length of service: 50    Therapeutic intervention: 90846-Family therapy without patient; needed because SW agreed, with patient's permission, to meet with son to discuss his concerns and questions regarding Bessy, diagnosis, symptoms, and plan.    Persons present: patient (for 10 minutes) and then met with patient's son Marky for 50 minutes     Interval history: Met briefly with Bessy, first.  He presented with casual dress and adequate hygiene.  He was alert and mildly distracted during our conversation but is doing better with maintaining eye contact.  Disoriented to time/date.  Denies any changes since we last talked.  He denies depression or sadness; continues to experience audio-visual hallucinations of his parents; admits to arguments with wife but denies ever physically harming her (unreliable based on cognitive impairments).  Admits to hypersomnia.  Family continues to report significant apathy. Bessy was agreeable to this writer meeting with his son, Marky, alone.     Marky presented as concerned, overwhelmed, and frustrated.  Shared he is very worried about both parents as they seem depressed - sleeping many hours and low motivation.  He was asked by them to return home from [place], so he could help care for his father and assist them, financially, but he feels they are now resistant to his support.  He also shared that Mrs. Nunez has not enlisted the support of a friend to care for Mr. Nunez a few days/week.  We discussed the possibility of having a family therapy session for the next scheduled appointment and Marky will discuss this with both parents.  I also explained my role as a  and my willingness to offer support, outside of formal counseling sessions, should he and parents need assistance with resources and benefits.     Target symptoms: recurrent depression, adjustment     Patient's interpersonal/verbal  exchanges: 90846-Family therapy without patient: patient not present and met briefly with pt, alone.    Progress toward goals: progressing slowly    Diagnosis:  F32.9 Unspecified Depressive Disorder    F02.81 Major neurocognitive disorder due to another medical condition with behavioral disturbance    Z63.8 Family Conflict     Plan: individual psychotherapy  family psychotherapy    Return to clinic: 2 weeks

## 2018-05-03 NOTE — PATIENT INSTRUCTIONS
Home  Health [facility] 04/05/2018 [facility] SN, PT, OT, ST, MSW.

## 2018-05-09 ENCOUNTER — TELEPHONE (OUTPATIENT)
Dept: INTERNAL MEDICINE | Facility: CLINIC | Age: 58
End: 2018-05-09

## 2018-05-09 RX ORDER — INSULIN LISPRO 100 [IU]/ML
20 INJECTION, SOLUTION INTRAVENOUS; SUBCUTANEOUS
Qty: 18 ML | Refills: 11 | Status: SHIPPED | OUTPATIENT
Start: 2018-05-09 | End: 2018-05-10

## 2018-05-09 NOTE — TELEPHONE ENCOUNTER
----- Message from Kelley Cabrera sent at 2018  3:19 PM CDT -----  Contact: pt's spouse Bekah Nunez  MRN: [id number]  : 1960  PCP: Geno Reyes  Home Phone      [phone]  Work Phone      Not on file.  Mobile          [phone]      MESSAGE:   Pt requesting refill or new Rx.   Is this a refill or new RX:  refill  RX name and strength: Humalog and Victoza insulin  Last office visit: 18  Is this a 30-day or 90-day RX:  30  Pharmacy name and location:  [facility]  Comments:  Says pt is completely out of his insulin. Please advise.    Phone:  [phone]

## 2018-05-10 ENCOUNTER — TELEPHONE (OUTPATIENT)
Dept: INTERNAL MEDICINE | Facility: CLINIC | Age: 58
End: 2018-05-10

## 2018-05-10 RX ORDER — INSULIN ASPART 100 [IU]/ML
20 INJECTION, SOLUTION INTRAVENOUS; SUBCUTANEOUS
Qty: 18 ML | Refills: 11 | Status: SHIPPED | OUTPATIENT
Start: 2018-05-10 | End: 2018-08-01 | Stop reason: SDUPTHER

## 2018-05-10 NOTE — TELEPHONE ENCOUNTER
----- Message from Kelley Cabrera sent at 5/10/2018  3:00 PM CDT -----  Contact: pt's wife Bekah Nunez  MRN: [id number]  : 1960  PCP: Geno Reyes  Home Phone      [phone]  Work Phone      Not on file.  Mobile          [phone]      MESSAGE:  Mrs Godfrey is calling to discuss pt's new Novolog insulin. Says on the days that pt has chemo his blood sugar can get up to 300 or 400, so she's wondering what the sliding scale is for those days. Please advise.  PHONE:  [phone]

## 2018-05-11 ENCOUNTER — LAB VISIT (OUTPATIENT)
Dept: LAB | Facility: HOSPITAL | Age: 58
End: 2018-05-11
Attending: NURSE PRACTITIONER
Payer: COMMERCIAL

## 2018-05-11 DIAGNOSIS — E11.9 TYPE 2 DIABETES MELLITUS WITHOUT COMPLICATION: ICD-10-CM

## 2018-05-11 LAB
CREAT UR-MCNC: 377.3 MG/DL
MICROALBUMIN UR DL<=1MG/L-MCNC: 1055 UG/ML
MICROALBUMIN/CREATININE RATIO: 279.6 UG/MG

## 2018-05-11 PROCEDURE — 82043 UR ALBUMIN QUANTITATIVE: CPT

## 2018-05-15 ENCOUNTER — OFFICE VISIT (OUTPATIENT)
Dept: HEMATOLOGY/ONCOLOGY | Facility: CLINIC | Age: 58
End: 2018-05-15
Payer: COMMERCIAL

## 2018-05-15 ENCOUNTER — INFUSION (OUTPATIENT)
Dept: INFUSION THERAPY | Facility: HOSPITAL | Age: 58
End: 2018-05-15
Attending: INTERNAL MEDICINE
Payer: COMMERCIAL

## 2018-05-15 ENCOUNTER — LAB VISIT (OUTPATIENT)
Dept: LAB | Facility: HOSPITAL | Age: 58
End: 2018-05-15
Payer: COMMERCIAL

## 2018-05-15 VITALS
BODY MASS INDEX: 31.89 KG/M2 | HEART RATE: 90 BPM | HEIGHT: 71 IN | WEIGHT: 227.75 LBS | RESPIRATION RATE: 17 BRPM | TEMPERATURE: 98 F | DIASTOLIC BLOOD PRESSURE: 68 MMHG | OXYGEN SATURATION: 96 % | SYSTOLIC BLOOD PRESSURE: 108 MMHG

## 2018-05-15 DIAGNOSIS — I77.6 CNS VASCULITIS: Primary | ICD-10-CM

## 2018-05-15 DIAGNOSIS — Z51.11 ENCOUNTER FOR CHEMOTHERAPY MANAGEMENT: ICD-10-CM

## 2018-05-15 LAB
ABO + RH BLD: NORMAL
ALBUMIN SERPL BCP-MCNC: 3.7 G/DL
ALP SERPL-CCNC: 81 U/L
ALT SERPL W/O P-5'-P-CCNC: 17 U/L
ANION GAP SERPL CALC-SCNC: 9 MMOL/L
AST SERPL-CCNC: 20 U/L
BASOPHILS # BLD AUTO: 0.04 K/UL
BASOPHILS NFR BLD: 0.5 %
BILIRUB SERPL-MCNC: 0.6 MG/DL
BLD GP AB SCN CELLS X3 SERPL QL: NORMAL
BUN SERPL-MCNC: 20 MG/DL
CALCIUM SERPL-MCNC: 10 MG/DL
CHLORIDE SERPL-SCNC: 109 MMOL/L
CO2 SERPL-SCNC: 24 MMOL/L
CREAT SERPL-MCNC: 1.3 MG/DL
DIFFERENTIAL METHOD: ABNORMAL
EOSINOPHIL # BLD AUTO: 0.1 K/UL
EOSINOPHIL NFR BLD: 1.7 %
ERYTHROCYTE [DISTWIDTH] IN BLOOD BY AUTOMATED COUNT: 15.1 %
EST. GFR  (AFRICAN AMERICAN): >60 ML/MIN/1.73 M^2
EST. GFR  (NON AFRICAN AMERICAN): 59.3 ML/MIN/1.73 M^2
GLUCOSE SERPL-MCNC: 153 MG/DL
HCT VFR BLD AUTO: 31.4 %
HGB BLD-MCNC: 10.5 G/DL
IMM GRANULOCYTES # BLD AUTO: 0.01 K/UL
IMM GRANULOCYTES NFR BLD AUTO: 0.1 %
LYMPHOCYTES # BLD AUTO: 1.2 K/UL
LYMPHOCYTES NFR BLD: 15.6 %
MCH RBC QN AUTO: 27.9 PG
MCHC RBC AUTO-ENTMCNC: 33.4 G/DL
MCV RBC AUTO: 84 FL
MONOCYTES # BLD AUTO: 0.9 K/UL
MONOCYTES NFR BLD: 11.2 %
NEUTROPHILS # BLD AUTO: 5.5 K/UL
NEUTROPHILS NFR BLD: 70.9 %
NRBC BLD-RTO: 0 /100 WBC
PLATELET # BLD AUTO: 178 K/UL
PMV BLD AUTO: 8.9 FL
POTASSIUM SERPL-SCNC: 4.9 MMOL/L
PROT SERPL-MCNC: 7 G/DL
RBC # BLD AUTO: 3.76 M/UL
SODIUM SERPL-SCNC: 142 MMOL/L
WBC # BLD AUTO: 7.77 K/UL

## 2018-05-15 PROCEDURE — 86850 RBC ANTIBODY SCREEN: CPT

## 2018-05-15 PROCEDURE — 80053 COMPREHEN METABOLIC PANEL: CPT

## 2018-05-15 PROCEDURE — 3078F DIAST BP <80 MM HG: CPT | Mod: CPTII,S$GLB,, | Performed by: INTERNAL MEDICINE

## 2018-05-15 PROCEDURE — 63600175 PHARM REV CODE 636 W HCPCS: Performed by: INTERNAL MEDICINE

## 2018-05-15 PROCEDURE — 96417 CHEMO IV INFUS EACH ADDL SEQ: CPT

## 2018-05-15 PROCEDURE — 25000003 PHARM REV CODE 250: Performed by: INTERNAL MEDICINE

## 2018-05-15 PROCEDURE — 36415 COLL VENOUS BLD VENIPUNCTURE: CPT

## 2018-05-15 PROCEDURE — 96415 CHEMO IV INFUSION ADDL HR: CPT

## 2018-05-15 PROCEDURE — 96413 CHEMO IV INFUSION 1 HR: CPT

## 2018-05-15 PROCEDURE — 3008F BODY MASS INDEX DOCD: CPT | Mod: CPTII,S$GLB,, | Performed by: INTERNAL MEDICINE

## 2018-05-15 PROCEDURE — 85025 COMPLETE CBC W/AUTO DIFF WBC: CPT

## 2018-05-15 PROCEDURE — 3074F SYST BP LT 130 MM HG: CPT | Mod: CPTII,S$GLB,, | Performed by: INTERNAL MEDICINE

## 2018-05-15 PROCEDURE — 99999 PR PBB SHADOW E&M-EST. PATIENT-LVL V: CPT | Mod: PBBFAC,,, | Performed by: INTERNAL MEDICINE

## 2018-05-15 PROCEDURE — 99214 OFFICE O/P EST MOD 30 MIN: CPT | Mod: S$GLB,,, | Performed by: INTERNAL MEDICINE

## 2018-05-15 RX ORDER — KETOCONAZOLE 20 MG/G
CREAM TOPICAL
Status: ON HOLD | COMMUNITY
Start: 2018-04-10 | End: 2020-01-08

## 2018-05-15 RX ORDER — SODIUM CHLORIDE 0.9 % (FLUSH) 0.9 %
10 SYRINGE (ML) INJECTION
Status: CANCELLED | OUTPATIENT
Start: 2018-05-15

## 2018-05-15 RX ORDER — ALCLOMETASONE DIPROPIONATE 0.5 MG/G
CREAM TOPICAL
Status: ON HOLD | COMMUNITY
Start: 2018-04-10 | End: 2020-01-08

## 2018-05-15 RX ORDER — LANCETS
EACH MISCELLANEOUS
Qty: 400 EACH | Refills: 3 | Status: ON HOLD | OUTPATIENT
Start: 2018-05-15 | End: 2018-10-20 | Stop reason: HOSPADM

## 2018-05-15 RX ORDER — SODIUM CHLORIDE 0.9 % (FLUSH) 0.9 %
10 SYRINGE (ML) INJECTION
Status: DISCONTINUED | OUTPATIENT
Start: 2018-05-15 | End: 2018-05-15 | Stop reason: HOSPADM

## 2018-05-15 RX ORDER — HEPARIN 100 UNIT/ML
500 SYRINGE INTRAVENOUS
Status: DISCONTINUED | OUTPATIENT
Start: 2018-05-15 | End: 2018-05-15 | Stop reason: HOSPADM

## 2018-05-15 RX ORDER — HEPARIN 100 UNIT/ML
500 SYRINGE INTRAVENOUS
Status: CANCELLED | OUTPATIENT
Start: 2018-05-15

## 2018-05-15 RX ADMIN — SODIUM CHLORIDE 1690 MG: 9 INJECTION, SOLUTION INTRAVENOUS at 04:05

## 2018-05-15 RX ADMIN — MESNA 1688 MG: 100 INJECTION, SOLUTION INTRAVENOUS at 04:05

## 2018-05-15 RX ADMIN — DEXAMETHASONE SODIUM PHOSPHATE: 4 INJECTION, SOLUTION INTRA-ARTICULAR; INTRALESIONAL; INTRAMUSCULAR; INTRAVENOUS; SOFT TISSUE at 04:05

## 2018-05-15 NOTE — TELEPHONE ENCOUNTER
Requested Prescriptions     Pending Prescriptions Disp Refills    blood sugar diagnostic Strp 400 each 3     Sig: To check BG 4 times daily, to use with insurance preferred meter    lancets Misc 400 each 3     Sig: To check BG 4 times daily, one touch verio meter. Dx code e11.65

## 2018-05-15 NOTE — PLAN OF CARE
Problem: Patient Care Overview  Goal: Discharge Needs Assessment  Outcome: Ongoing (interventions implemented as appropriate)  Pt tolerated cytoxan mesna well no reactions noted, leaves clinic accompanied by wife, encouraged increased daily po fluid intake pt verbalized understanding, PIV d/c'd cath tip intact, site covered with dry dressing, no redness swelling present. Leaves clinic in w/c NAD noted instructed to contact MD office with any questions or concerns.

## 2018-05-15 NOTE — TELEPHONE ENCOUNTER
----- Message from Yaquelin Donaldson sent at 5/15/2018 11:42 AM CDT -----  Contact: Self  Bessy Nunez  MRN: [id number]  : 1960  PCP: Geno Reyes  Home Phone      [phone]  Work Phone      Not on file.  Mobile          [phone]      MESSAGE:   Patient needs a refill on his testing strips, and lancets called in to [facility] in [place]. Please return call @ [phone]. Thanks.

## 2018-05-15 NOTE — PROGRESS NOTES
SECTION OF HEMATOLOGY AND BONE MARROW TRANSPLANT  New Patient Visit   05/16/2018  Referred by:  No ref. provider found  Referred for:     CHIEF COMPLAINT:   Chief Complaint   Patient presents with    Fatigue       HISTORY OF PRESENT ILLNESS:   Presents today to clinic with son for next dose (#10 ) of Cytoxan for CNS Vasculitis.   Significant improvement in neuro symptoms/mentation with increase in CTX dose to 750 mg/m2.   Tolerating ctx well.  Denies fever, chills, nightsweats, bleeding, brusing, lymphadenopathy, signs/symptoms of splenomegaly.    Comes to clinic with wife.     PAST MEDICAL HISTORY:   Past Medical History:   Diagnosis Date    CNS vasculitis 6/2/2017    Follows with Dr. Love By mri.  Several active lesions, many old. 5/13: MRA brain/neck, MRI brain w/wo contrast show no vascular occlusion, multiple foci of hyperintensity in deep cerebral periventricular white matter in pattern of demyelinating process.  5/17: MRI spine performed, no spinal cord lesions. Hospitalization 6/2017. EEG was performed negative for seizures.  Repeat MRI showing new lesion, question whether this was demyelination versus CVA. 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 Findings compatible with a small acute lacunar infarct adjacent to the left frontal horn.  Nonspecific enhancement just inferior to this region and extending into the left basal ganglia, as well as within the inferior aspect of the left cerebellum.  No evidence of a focal mass. 2.  Extensive increased signal intensity involving the periventricular white matter compatible with demyelinating disease versus vasculitis. 3.  Clinical correlation and followup recommended. 4.  This reportedly flattened in the EPIC and the corpus callosum medical record  "system.    Mixed hyperlipidemia 11/9/2012    NAFLD (nonalcoholic fatty liver disease) 10/11/2013    CHASE (nonalcoholic steatohepatitis)     Obesity     Stroke     Type 2 diabetes mellitus with diabetic polyneuropathy, with long-term current use of insulin 5/14/2017    Type 2 diabetes mellitus with hyperglycemia, with long-term current use of insulin 10/11/2013       PAST SURGICAL HISTORY:   Past Surgical History:   Procedure Laterality Date    SKIN CANCER EXCISION         PAST SOCIAL HISTORY:   reports that he has never smoked. He has never used smokeless tobacco. He reports that he does not drink alcohol or use drugs.    FAMILY HISTORY:  Family History   Problem Relation Age of Onset    Heart disease Mother     Hypertension Mother     Heart failure Mother     Cancer Father         lung    Diabetes Maternal Aunt        CURRENT MEDICATIONS:   Current Outpatient Prescriptions   Medication Sig    alclomethasone (ACLOVATE) 0.05 % cream     aspirin (ECOTRIN) 81 MG EC tablet Take 81 mg by mouth once daily.    atenolol (TENORMIN) 50 MG tablet Take 1 tablet (50 mg total) by mouth once daily.    atorvastatin (LIPITOR) 40 MG tablet TAKE ONE TABLET BY MOUTH ONCE DAILY    blood-glucose meter kit To check BG 4 times daily, one touch verio meter. Dx code e11.65    diltiaZEM (DILACOR XR) 240 MG CDCR TAKE ONE CAPSULE BY MOUTH ONCE DAILY    insulin aspart U-100 (NOVOLOG) 100 unit/mL InPn pen Inject 20 Units into the skin 3 (three) times daily with meals.    insulin glargine (BASAGLAR KWIKPEN U-100 INSULIN) 100 unit/mL (3 mL) InPn pen Inject 40 Units into the skin every evening.    insulin needles, disposable, (BD ULTRA-FINE ANA PEN NEEDLES) 32 x 5/32 " Ndle Inject twice daily    insulin syringe-needle U-100 (INSULIN SYRINGE) 1/2 mL 30 gauge x 5/16 Syrg Use 3x/day    insulin syringe-needle U-100 0.5 mL 31 gauge x 5/16 Syrg     ketoconazole (NIZORAL) 2 % cream     levETIRAcetam (KEPPRA) 500 MG Tab TAKE ONE " "TABLET BY MOUTH TWICE DAILY    liraglutide 0.6 mg/0.1 mL, 18 mg/3 mL, subq PNIJ (VICTOZA 3-TOMASZ) 0.6 mg/0.1 mL (18 mg/3 mL) PnIj Inject 1.8 mg into the skin once daily.    metFORMIN (GLUCOPHAGE-XR) 500 MG 24 hr tablet TAKE TWO TABLETS BY MOUTH TWICE DAILY WITH MEALS    NOVOFINE PLUS 32 gauge x 1/6" Ndle     ONETOUCH DELICA LANCETS 33 gauge Misc     pen needle, diabetic (BD ULTRA-FINE ANA PEN NEEDLE) 32 gauge x 5/32" Ndle use to inject insulin    sulfamethoxazole-trimethoprim 800-160mg (BACTRIM DS) 800-160 mg Tab Take every Monday, Wednesday, and Friday.  Infectious Disease will decide if this is needed in 3-4 months.    valsartan (DIOVAN) 320 MG tablet TAKE ONE TABLET BY MOUTH ONCE DAILY    venlafaxine (EFFEXOR-XR) 75 MG 24 hr capsule TAKE 1 CAPSULE BY MOUTH ONCE DAILY    vitamin D 1000 units Tab Take 5 tablets (5,000 Units total) by mouth once daily.    blood sugar diagnostic Strp To check BG 4 times daily, to use with insurance preferred meter    calcium carbonate (OS-DONNA) 500 mg calcium (1,250 mg) tablet Take 2 tablets (1,000 mg total) by mouth once.    lancets Misc To check BG 4 times daily, one touch verio meter. Dx code e11.65    omega-3 fatty acids-vitamin E (FISH OIL) 1,000 mg Cap Take 1 capsule by mouth 2 (two) times daily.     No current facility-administered medications for this visit.      ALLERGIES:   Review of patient's allergies indicates:  No Known Allergies    REVIEW OF SYSTEMS:   General ROS: Positive for fatigue.   Psychological ROS: Positive for confusion.   Ophthalmic ROS: negative  ENT ROS: negative  Allergy and Immunology ROS: negative  Hematological and Lymphatic ROS: negative  Endocrine ROS: negative  Respiratory ROS: negative  Cardiovascular ROS: negative  Gastrointestinal ROS: negative  Genito-Urinary ROS: negative  Musculoskeletal ROS: negative  Neurological ROS: see HPI  Dermatological ROS: negative    PHYSICAL EXAM:   Vitals:    05/15/18 1434   BP: 108/68   Pulse: 90 "   Resp: 17   Temp: 98.4 °F (36.9 °C)       General - well developed, well nourished; much more interactive today   Head & Face - no sinus tenderness  Eyes - normal conjunctivae and lids   ENT - normal external auditory canals  Chest and Lung - normal respiratory effort, clear to auscultation bilaterally   Cardiovascular - RRR with no MGR, normal S1 and S2; no pedal edema  Abdomen -  soft, nontender, no palpable hepatomegaly or splenomegaly  Lymph - no palpable lymphadenopathy  Extremities - unremarkable nails and digits  Heme - no bruising, petechiae, pallor  Skin - no rashes or lesions  Psych - Normal mood and affect. Confusion noted.      ECOG Performance Status: (foot note - ECOG PS provided by Eastern Cooperative Oncology Group) 1 - Symptomatic but completely ambulatory    Karnofsky Performance Score:  70%- Cares for Self: Unable to Carry on Normal Activity or Active Work  DATA:   Lab Results   Component Value Date    WBC 7.77 05/15/2018    HGB 10.5 (L) 05/15/2018    HCT 31.4 (L) 05/15/2018    MCV 84 05/15/2018     05/15/2018     Gran # (ANC)   Date Value Ref Range Status   05/15/2018 5.5 1.8 - 7.7 K/uL Final     Gran%   Date Value Ref Range Status   05/15/2018 70.9 38.0 - 73.0 % Final     CMP  Sodium   Date Value Ref Range Status   05/15/2018 142 136 - 145 mmol/L Final     Potassium   Date Value Ref Range Status   05/15/2018 4.9 3.5 - 5.1 mmol/L Final     Chloride   Date Value Ref Range Status   05/15/2018 109 95 - 110 mmol/L Final     CO2   Date Value Ref Range Status   05/15/2018 24 23 - 29 mmol/L Final     Glucose   Date Value Ref Range Status   05/15/2018 153 (H) 70 - 110 mg/dL Final     BUN, Bld   Date Value Ref Range Status   05/15/2018 20 6 - 20 mg/dL Final     Creatinine   Date Value Ref Range Status   05/15/2018 1.3 0.5 - 1.4 mg/dL Final     Calcium   Date Value Ref Range Status   05/15/2018 10.0 8.7 - 10.5 mg/dL Final     Total Protein   Date Value Ref Range Status   05/15/2018 7.0 6.0 - 8.4  g/dL Final     Albumin   Date Value Ref Range Status   05/15/2018 3.7 3.5 - 5.2 g/dL Final   10/11/2013 4.3 3.6 - 5.1 g/dL Final     Comment:     @ Test Performed By:  [facility]  AMELIA Vázquez M.D.,   [address] [place] [address]  CLIA #[id number]     Total Bilirubin   Date Value Ref Range Status   05/15/2018 0.6 0.1 - 1.0 mg/dL Final     Comment:     For infants and newborns, interpretation of results should be based  on gestational age, weight and in agreement with clinical  observations.  Premature Infant recommended reference ranges:  Up to 24 hours.............<8.0 mg/dL  Up to 48 hours............<12.0 mg/dL  3-5 days..................<15.0 mg/dL  6-29 days.................<15.0 mg/dL       Alkaline Phosphatase   Date Value Ref Range Status   05/15/2018 81 55 - 135 U/L Final     AST   Date Value Ref Range Status   05/15/2018 20 10 - 40 U/L Final     ALT   Date Value Ref Range Status   05/15/2018 17 10 - 44 U/L Final     Anion Gap   Date Value Ref Range Status   05/15/2018 9 8 - 16 mmol/L Final     eGFR if    Date Value Ref Range Status   05/15/2018 >60.0 >60 mL/min/1.73 m^2 Final     eGFR if non    Date Value Ref Range Status   05/15/2018 59.3 (A) >60 mL/min/1.73 m^2 Final     Comment:     Calculation used to obtain the estimated glomerular filtration  rate (eGFR) is the CKD-EPI equation.            ASSESSMENT AND PLAN:   Encounter Diagnoses   Name Primary?    CNS vasculitis Yes    Encounter for chemotherapy management      -proceed with dose #10  Cytoxan increased to 750mg/m2 today with mesna support (mesna dose also increased) for CNS vasculitis, protocol per Dr. Love; improved symptoms at increased dose   -per neurology, plan for approximate 1 year of monthly therapy; will discuss duration of therapy with Dr. Love after dose #12   -No contraindication to proceed with CTX today, WBC WNL, anemia stable,  thrombocytopenia stable without bleeding. Creatinine and liver enzymes wnl.  - Transfuse for Hgb<7 and Plt <10k  - Pt educated to notify us of any bleeding or fevers or S/S infection. Educated on the importance of using walker while platelet count is low. Educated if he falls he could hit head and have a brain hemorrhage with low platelet count. Verbalizes understanding.     Follow Up:   -same labs, Nurse practioner appt, chair for #11 cytoxan  infusion in 4 weeks   -will see me with labs prior to infusion #12 in 8 weeks     Doron Goldstein MD  Hematology & Stem Cell Transplant

## 2018-05-17 ENCOUNTER — OFFICE VISIT (OUTPATIENT)
Dept: PSYCHIATRY | Facility: CLINIC | Age: 58
End: 2018-05-17
Payer: COMMERCIAL

## 2018-05-17 DIAGNOSIS — Z63.8 FAMILY CONFLICT: ICD-10-CM

## 2018-05-17 DIAGNOSIS — F02.818 MAJOR NEUROCOGNITIVE DISORDER DUE TO ANOTHER MEDICAL CONDITION WITH BEHAVIORAL DISTURBANCE: ICD-10-CM

## 2018-05-17 DIAGNOSIS — F32.A DEPRESSIVE DISORDER: Primary | ICD-10-CM

## 2018-05-17 PROCEDURE — 90847 FAMILY PSYTX W/PT 50 MIN: CPT | Mod: S$GLB,,, | Performed by: SOCIAL WORKER

## 2018-05-17 SDOH — SOCIAL DETERMINANTS OF HEALTH (SDOH): OTHER SPECIFIED PROBLEMS RELATED TO PRIMARY SUPPORT GROUP: Z63.8

## 2018-05-17 NOTE — PROGRESS NOTES
"Family Psychotherapy (PhD/LCSW)    5/17/2018    Site: [facility]    Length of service: 50    Therapeutic intervention: 90847-Family therapy with patient; needed because Bessy has neurocognitive disorder and needs support of family to be able to implement any changes/recommendations    Persons present: Bessy, wife Bekah, son Marky    Interval history: Wife and son report continued small improvements in Bessy's short-term memory and functioning.  His gait was observably improved, today.  Hygiene was adequate and he was neatly dressed.  He's made some improvement with completing self-care tasks (brushing teeth, washing face) with written and verbal reminders, per report of family.  Pt oriented (minus day of month) and participated in full hour session.  Family is currently struggling to adjust to changes in responsibilities and roles since Bessy's stroke.  Bessy expresses the desire to "do what I want" now that he's retired, but son and wife are concerned that he sleeps too much and doesn't show interest in doing much else.  Bekah wants more free time for herself but son is concerned that she is also sleeping too much and not keeping up with daily responsibilities around the house.  Marky feels like he gets mixed messages from his parents, who "tell me they want something different from me every time I ask".  He is seeking clarification on the support that his parents want and need.  Throughout the conversation it was noted that both Bessy and Bekah have difficulty staying focused on the present and instead resort to talking to Marky about the past and how Marky used to depend on them for everything.  SW highlighted this several times and helped refocus the discussion to the families present situation.  We will return to this discussion at the next session as this was the first full-family meeting we've had since Bessy began treatment.  Family was able to identify some goals: clarifying roles/responsibilities of " members, improving positive interactions between Bessy and Marky, and supporting Bessy to independently (as is possible) complete more self-care and household tasks.      Target symptoms: recurrent depression, adjustment     Patient's interpersonal/verbal exchanges: 40367 -Family therapy with patient.  Frequent interruptions between family members, difficulty staying focused on issues.  Positive use of humor.  Bekah supportive of Bessy (holding hands, acknowledging his improvements).    Progress toward goals: progressing slowly    Diagnosis:  F32.9 Unspecified Depressive Disorder    F02.81 Major neurocognitive disorder due to another medical condition with behavioral disturbance    Z63.8 Family Conflict     Plan: individual psychotherapy  family psychotherapy    Return to clinic: 2 weeks, family will call to schedule

## 2018-05-21 DIAGNOSIS — I10 ESSENTIAL HYPERTENSION: Chronic | ICD-10-CM

## 2018-05-28 RX ORDER — SERTRALINE HYDROCHLORIDE 100 MG/1
TABLET, FILM COATED ORAL
Qty: 45 TABLET | Refills: 11 | OUTPATIENT
Start: 2018-05-28

## 2018-05-28 NOTE — TELEPHONE ENCOUNTER
----- Message from Aaliyah Francisco NP sent at 2018  6:43 PM CDT -----  Contact: pt's wife Bekah  Please address on Monday - ok to send needed Diabetes supplies  ----- Message -----  From: Traci Stafford MA  Sent: 2018   4:24 PM  To: Aaliyah Francisco NP        ----- Message -----  From: Kelley Cabrera  Sent: 2018   3:50 PM  To: Amy LINARES Staff    Bessy CURTIS Baldwinby  MRN: [id number]  : 1960  PCP: Geno Reyes  Home Phone      [phone]  Work Phone      Not on file.  Mobile          [phone]      MESSAGE:  Pt's insurance won't cover the strips and lancets we called in last week. Asking if we can call in TruMaix Aur strips and lancets. Please advise.  PHONE:  [phone]  PHARMACY:  [facility]

## 2018-06-05 ENCOUNTER — TELEPHONE (OUTPATIENT)
Dept: INTERNAL MEDICINE | Facility: CLINIC | Age: 58
End: 2018-06-05

## 2018-06-05 RX ORDER — PEN NEEDLE, DIABETIC 30 GX3/16"
NEEDLE, DISPOSABLE MISCELLANEOUS
Qty: 100 EACH | Refills: 3 | Status: SHIPPED | OUTPATIENT
Start: 2018-06-05 | End: 2019-04-22

## 2018-06-05 NOTE — TELEPHONE ENCOUNTER
----- Message from Suzi Jones sent at 2018  7:23 AM CDT -----  Contact: MARTHA / SON  Bessy Nunez  MRN: [id number]  : 1960  PCP: Geno Reyes  Home Phone      [phone]  Work Phone      Not on file.  Mobile          [phone]      MESSAGE: LEFT A VOICEMAIL ON 18 WANTING TO KNOW WHY PT'S REFILLS HAVEN'T BEEN DONE YET. SAID HE WAS TOLD BY [facility] THAT THE MEDICINE SHOULD ALWAYS BE AVAILABLE TO HIM AT A MOMENT'S NOTICE AND THEY HAVE NOT HEARD ANYTHING BACK YET. PLEASE CALL    NEEDS NEEDLES AND VICTOZA SENT TO [facility]     [phone]

## 2018-06-05 NOTE — TELEPHONE ENCOUNTER
I refilled both. Not sure who said this would be ready on a moments notice. I filled as soon as I got the request

## 2018-06-07 RX ORDER — HEPARIN 100 UNIT/ML
500 SYRINGE INTRAVENOUS
Status: CANCELLED | OUTPATIENT
Start: 2018-06-07

## 2018-06-07 RX ORDER — SODIUM CHLORIDE 0.9 % (FLUSH) 0.9 %
10 SYRINGE (ML) INJECTION
Status: CANCELLED | OUTPATIENT
Start: 2018-06-07

## 2018-06-08 ENCOUNTER — OFFICE VISIT (OUTPATIENT)
Dept: PSYCHIATRY | Facility: CLINIC | Age: 58
End: 2018-06-08
Payer: COMMERCIAL

## 2018-06-08 ENCOUNTER — TELEPHONE (OUTPATIENT)
Dept: INTERNAL MEDICINE | Facility: CLINIC | Age: 58
End: 2018-06-08

## 2018-06-08 DIAGNOSIS — Z63.8 FAMILY CONFLICT: ICD-10-CM

## 2018-06-08 DIAGNOSIS — F32.A DEPRESSIVE DISORDER: Primary | ICD-10-CM

## 2018-06-08 DIAGNOSIS — F02.818 MAJOR NEUROCOGNITIVE DISORDER DUE TO ANOTHER MEDICAL CONDITION WITH BEHAVIORAL DISTURBANCE: ICD-10-CM

## 2018-06-08 PROCEDURE — 90847 FAMILY PSYTX W/PT 50 MIN: CPT | Mod: S$GLB,,, | Performed by: SOCIAL WORKER

## 2018-06-08 SDOH — SOCIAL DETERMINANTS OF HEALTH (SDOH): OTHER SPECIFIED PROBLEMS RELATED TO PRIMARY SUPPORT GROUP: Z63.8

## 2018-06-08 NOTE — TELEPHONE ENCOUNTER
----- Message from Lisseth Arce sent at 2018  2:09 PM CDT -----  Contact: [facility]  Bessy Nunez  MRN: [id number]  : 1960  PCP: Geno Reyes  Home Phone      [phone]  Work Phone      Not on file.  Mobile          [phone]      MESSAGE:  Needs prescription for TRUE METRIX Test strips and lancets  (He will run out over the weekend, so if this can be done today please)    Pharmacy: [facility]    Phone: [phone]

## 2018-06-08 NOTE — PROGRESS NOTES
Family Psychotherapy (PhD/LCSW)    6/8/2018    Site: [facility]    Length of service: 50    Therapeutic intervention: 97058-Family therapy with patient; needed because Bessy has neurocognitive disorder and needs support of family to be able to implement any changes/recommendations    Persons present: Bessy, wife Bekah, son Marky    Interval history: Bessy and family arrived to session, all dressed casually and with adequate hygiene.  Bessy was generally oriented (forgot SW name, incorrect day of month) and understood the purpose of our counseling session.  Bekah and Marky continue to express concerns about Bessy sleeping too much, eating too much, and not showing interest in hobbies.  Reminded family that many of these behaviors are common for stroke sufferers, while also supporting their desire to have Bessy more active.  Bessy makes conflicting statements, first saying he doesn't want to do anything that's bad for his health, then stating he wants to do whatever he pleases.  With further clarification, it seems that Bessy doesn't want to feel nagged about doing things and doesn't appreciate his wife's rough approach (ie: slapping her hand on the bed to wake him).  Bekah also shared that she is living with depression and anxiety and finds it hard to motivate Bessy, when she's not feeling very motivated, herself.  Finally, the couple has very little routine, so neither has much of a reason for getting out of bed and starting their day.  We discussed a plan, as outlined below, for increasing their activity, locating counseling/psychiatry for wife, and exploring private duty care giver services.    Target symptoms: recurrent depression, adjustment     Patient's interpersonal/verbal exchanges: 32715 -Family therapy with patient.  Frequent interruptions between family members, difficulty staying focused on issues.  Positive use of humor.  Bekah supportive of Bessy (holding hands, acknowledging his  improvements).    Progress toward goals: progressing slowly    Diagnosis:  F32.9 Unspecified Depressive Disorder    F02.81 Major neurocognitive disorder due to another medical condition with behavioral disturbance    Z63.8 Family Conflict     Plan: individual psychotherapy  family psychotherapy   SW to locate psychiatric services for Bekah to evaluate depression and anxiety (social isolation, fatigue, anxiety around leaving home).  SW will investigate cost of private care giving services.  SW will look into outpatient PT services and senior exercise classes.    Return to clinic: 1 week

## 2018-06-11 PROBLEM — D75.9 CYTOPENIA: Status: ACTIVE | Noted: 2017-08-30

## 2018-06-12 ENCOUNTER — OFFICE VISIT (OUTPATIENT)
Dept: HEMATOLOGY/ONCOLOGY | Facility: CLINIC | Age: 58
End: 2018-06-12
Payer: COMMERCIAL

## 2018-06-12 ENCOUNTER — INFUSION (OUTPATIENT)
Dept: INFUSION THERAPY | Facility: HOSPITAL | Age: 58
End: 2018-06-12
Attending: INTERNAL MEDICINE
Payer: COMMERCIAL

## 2018-06-12 ENCOUNTER — LAB VISIT (OUTPATIENT)
Dept: LAB | Facility: HOSPITAL | Age: 58
End: 2018-06-12
Payer: COMMERCIAL

## 2018-06-12 ENCOUNTER — TELEPHONE (OUTPATIENT)
Dept: HEMATOLOGY/ONCOLOGY | Facility: CLINIC | Age: 58
End: 2018-06-12

## 2018-06-12 VITALS
HEART RATE: 96 BPM | SYSTOLIC BLOOD PRESSURE: 135 MMHG | DIASTOLIC BLOOD PRESSURE: 83 MMHG | RESPIRATION RATE: 18 BRPM | OXYGEN SATURATION: 99 % | TEMPERATURE: 99 F

## 2018-06-12 VITALS
RESPIRATION RATE: 18 BRPM | DIASTOLIC BLOOD PRESSURE: 83 MMHG | WEIGHT: 224.63 LBS | TEMPERATURE: 99 F | BODY MASS INDEX: 32.16 KG/M2 | OXYGEN SATURATION: 95 % | SYSTOLIC BLOOD PRESSURE: 129 MMHG | HEART RATE: 110 BPM | HEIGHT: 70 IN

## 2018-06-12 DIAGNOSIS — I77.6 CNS VASCULITIS: Primary | ICD-10-CM

## 2018-06-12 DIAGNOSIS — T45.1X5A CHEMOTHERAPY INDUCED NAUSEA AND VOMITING: ICD-10-CM

## 2018-06-12 DIAGNOSIS — R11.2 CHEMOTHERAPY INDUCED NAUSEA AND VOMITING: ICD-10-CM

## 2018-06-12 DIAGNOSIS — T45.1X5A CHEMOTHERAPY-INDUCED NEUTROPENIA: ICD-10-CM

## 2018-06-12 DIAGNOSIS — D75.9 CYTOPENIA: ICD-10-CM

## 2018-06-12 DIAGNOSIS — D70.1 CHEMOTHERAPY-INDUCED NEUTROPENIA: ICD-10-CM

## 2018-06-12 DIAGNOSIS — I10 ESSENTIAL HYPERTENSION: Chronic | ICD-10-CM

## 2018-06-12 LAB
ABO + RH BLD: NORMAL
ALBUMIN SERPL BCP-MCNC: 4.1 G/DL
ALP SERPL-CCNC: 102 U/L
ALT SERPL W/O P-5'-P-CCNC: 20 U/L
ANION GAP SERPL CALC-SCNC: 12 MMOL/L
AST SERPL-CCNC: 26 U/L
BASOPHILS # BLD AUTO: 0.04 K/UL
BASOPHILS NFR BLD: 0.4 %
BILIRUB SERPL-MCNC: 0.8 MG/DL
BLD GP AB SCN CELLS X3 SERPL QL: NORMAL
BUN SERPL-MCNC: 14 MG/DL
CALCIUM SERPL-MCNC: 10.3 MG/DL
CHLORIDE SERPL-SCNC: 104 MMOL/L
CO2 SERPL-SCNC: 25 MMOL/L
CREAT SERPL-MCNC: 1.3 MG/DL
DIFFERENTIAL METHOD: ABNORMAL
EOSINOPHIL # BLD AUTO: 0.1 K/UL
EOSINOPHIL NFR BLD: 1 %
ERYTHROCYTE [DISTWIDTH] IN BLOOD BY AUTOMATED COUNT: 15.7 %
EST. GFR  (AFRICAN AMERICAN): >60 ML/MIN/1.73 M^2
EST. GFR  (NON AFRICAN AMERICAN): 59.3 ML/MIN/1.73 M^2
GLUCOSE SERPL-MCNC: 119 MG/DL
HCT VFR BLD AUTO: 35.6 %
HGB BLD-MCNC: 12 G/DL
IMM GRANULOCYTES # BLD AUTO: 0.06 K/UL
IMM GRANULOCYTES NFR BLD AUTO: 0.6 %
LYMPHOCYTES # BLD AUTO: 1.6 K/UL
LYMPHOCYTES NFR BLD: 15.7 %
MCH RBC QN AUTO: 27.8 PG
MCHC RBC AUTO-ENTMCNC: 33.7 G/DL
MCV RBC AUTO: 82 FL
MONOCYTES # BLD AUTO: 0.9 K/UL
MONOCYTES NFR BLD: 8.5 %
NEUTROPHILS # BLD AUTO: 7.5 K/UL
NEUTROPHILS NFR BLD: 73.8 %
NRBC BLD-RTO: 0 /100 WBC
PLATELET # BLD AUTO: 185 K/UL
PMV BLD AUTO: 9.2 FL
POTASSIUM SERPL-SCNC: 3.3 MMOL/L
PROT SERPL-MCNC: 7.5 G/DL
RBC # BLD AUTO: 4.32 M/UL
SODIUM SERPL-SCNC: 141 MMOL/L
WBC # BLD AUTO: 10.12 K/UL

## 2018-06-12 PROCEDURE — 86901 BLOOD TYPING SEROLOGIC RH(D): CPT

## 2018-06-12 PROCEDURE — 96411 CHEMO IV PUSH ADDL DRUG: CPT

## 2018-06-12 PROCEDURE — 63600175 PHARM REV CODE 636 W HCPCS: Mod: JG | Performed by: INTERNAL MEDICINE

## 2018-06-12 PROCEDURE — 96413 CHEMO IV INFUSION 1 HR: CPT

## 2018-06-12 PROCEDURE — 96367 TX/PROPH/DG ADDL SEQ IV INF: CPT

## 2018-06-12 PROCEDURE — 99999 PR PBB SHADOW E&M-EST. PATIENT-LVL III: CPT | Mod: PBBFAC,,, | Performed by: NURSE PRACTITIONER

## 2018-06-12 PROCEDURE — 96375 TX/PRO/DX INJ NEW DRUG ADDON: CPT

## 2018-06-12 PROCEDURE — 3008F BODY MASS INDEX DOCD: CPT | Mod: CPTII,S$GLB,, | Performed by: NURSE PRACTITIONER

## 2018-06-12 PROCEDURE — 85025 COMPLETE CBC W/AUTO DIFF WBC: CPT

## 2018-06-12 PROCEDURE — 25000003 PHARM REV CODE 250: Performed by: INTERNAL MEDICINE

## 2018-06-12 PROCEDURE — 63600175 PHARM REV CODE 636 W HCPCS: Performed by: NURSE PRACTITIONER

## 2018-06-12 PROCEDURE — 99215 OFFICE O/P EST HI 40 MIN: CPT | Mod: S$GLB,,, | Performed by: NURSE PRACTITIONER

## 2018-06-12 PROCEDURE — 80053 COMPREHEN METABOLIC PANEL: CPT

## 2018-06-12 RX ORDER — SODIUM CHLORIDE 0.9 % (FLUSH) 0.9 %
10 SYRINGE (ML) INJECTION
Status: DISCONTINUED | OUTPATIENT
Start: 2018-06-12 | End: 2018-06-12 | Stop reason: HOSPADM

## 2018-06-12 RX ORDER — ATENOLOL 50 MG/1
50 TABLET ORAL DAILY
Qty: 90 TABLET | Refills: 3 | Status: SHIPPED | OUTPATIENT
Start: 2018-06-12 | End: 2018-08-01 | Stop reason: SDUPTHER

## 2018-06-12 RX ORDER — ONDANSETRON 4 MG/1
4 TABLET, FILM COATED ORAL EVERY 6 HOURS PRN
Qty: 30 TABLET | Refills: 2 | Status: SHIPPED | OUTPATIENT
Start: 2018-06-12 | End: 2018-08-17 | Stop reason: CLARIF

## 2018-06-12 RX ORDER — ONDANSETRON 2 MG/ML
8 INJECTION INTRAMUSCULAR; INTRAVENOUS ONCE
Status: COMPLETED | OUTPATIENT
Start: 2018-06-12 | End: 2018-06-12

## 2018-06-12 RX ORDER — ONDANSETRON 4 MG/1
4 TABLET, FILM COATED ORAL EVERY 6 HOURS PRN
Qty: 30 TABLET | Refills: 2 | Status: SHIPPED | OUTPATIENT
Start: 2018-06-12 | End: 2018-06-12 | Stop reason: SDUPTHER

## 2018-06-12 RX ORDER — SULFAMETHOXAZOLE AND TRIMETHOPRIM 800; 160 MG/1; MG/1
TABLET ORAL
Qty: 12 TABLET | Refills: 9 | Status: SHIPPED | OUTPATIENT
Start: 2018-06-12 | End: 2018-08-17 | Stop reason: ALTCHOICE

## 2018-06-12 RX ADMIN — MESNA 1688 MG: 100 INJECTION, SOLUTION INTRAVENOUS at 01:06

## 2018-06-12 RX ADMIN — DEXAMETHASONE SODIUM PHOSPHATE: 4 INJECTION, SOLUTION INTRA-ARTICULAR; INTRALESIONAL; INTRAMUSCULAR; INTRAVENOUS; SOFT TISSUE at 12:06

## 2018-06-12 RX ADMIN — SODIUM CHLORIDE 1690 MG: 9 INJECTION, SOLUTION INTRAVENOUS at 01:06

## 2018-06-12 RX ADMIN — ONDANSETRON 8 MG: 2 INJECTION, SOLUTION INTRAMUSCULAR; INTRAVENOUS at 01:06

## 2018-06-12 NOTE — PLAN OF CARE
Problem: Patient Care Overview  Goal: Plan of Care Review  Outcome: Ongoing (interventions implemented as appropriate)  Following infusion of Aloxi/Decadron pre-med, Pt had episode of vomiting copious amounts of undigested food @ 12:30. Kalyn Quarles NP informed and Pt was given Zofran 8mg IVP @ 13:12, no further c/o nausea or vomiting observed or reported. Pt completed tx and tolerated well. VSS. Pt discharged home with copy of AVS, ambulated unassisted,  accompanied by son

## 2018-06-12 NOTE — PLAN OF CARE
Problem: Chemotherapy Effects (Adult)  Goal: Signs and Symptoms of Listed Potential Problems Will be Absent, Minimized or Managed (Chemotherapy Effects)  Signs and symptoms of listed potential problems will be absent, minimized or managed by discharge/transition of care (reference Chemotherapy Effects (Adult) CPG).   Outcome: Ongoing (interventions implemented as appropriate)  Pt arrived accompanied by  Son, ambulating on own with standby assist. Side effects of chemo and self care tips reviewed with Pt and son, they verbalized understanding. [place] offered and refreshmsnets

## 2018-06-12 NOTE — Clinical Note
Follow Up: -cbc, cmp, t+s, MD appt with Dr. Goldstein to discuss next plan in treatment, chair for #12 cytoxan infusion in 4 weeks

## 2018-06-12 NOTE — TELEPHONE ENCOUNTER
Bactrim and atenolol refilled and sent to pt's [facility] pharmacy. Also sent in zofran 4 mg q6hr prn for CINV. Pt was given zofran 8 mg IV in infusion center for emesis prior to chemotherapy today. Msg left for son with update.    Kalyn Quarles, JOE, NP  Hematology/Oncology    ----- Message from Mya Trinidad sent at 6/12/2018  1:30 PM CDT -----  Contact: Pt son  Pt son calling requesting refills on Bactrim and Atenolol     Pharmacy number [phone]    Marky call back number [phone]

## 2018-06-12 NOTE — PROGRESS NOTES
SECTION OF HEMATOLOGY AND BONE MARROW TRANSPLANT  New Patient Visit   06/12/2018  Referred by:  No ref. provider found  Referred for:     CHIEF COMPLAINT:   No chief complaint on file.      HISTORY OF PRESENT ILLNESS:   Presents today to clinic with son for next dose (#11 ) of Cytoxan for CNS Vasculitis. Significant improvement in neuro symptoms/mentation with increase in CTX dose to 750 mg/m2. Not as confused, this is much better, denies headaches and blurry vision.  Tolerating ctx well.  Denies fever, chills, nightsweats, bleeding, bruising, lymphadenopathy, n/v/d/c, signs/symptoms of splenomegaly.  Does report chronic back pain, currently not hurting. States that brace really helps.     PAST MEDICAL HISTORY:   Past Medical History:   Diagnosis Date    CNS vasculitis 6/2/2017    Follows with Dr. Love By mri.  Several active lesions, many old. 5/13: MRA brain/neck, MRI brain w/wo contrast show no vascular occlusion, multiple foci of hyperintensity in deep cerebral periventricular white matter in pattern of demyelinating process.  5/17: MRI spine performed, no spinal cord lesions. Hospitalization 6/2017. EEG was performed negative for seizures.  Repeat MRI showing new lesion, question whether this was demyelination versus CVA. 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 Findings compatible with a small acute lacunar infarct adjacent to the left frontal horn.  Nonspecific enhancement just inferior to this region and extending into the left basal ganglia, as well as within the inferior aspect of the left cerebellum.  No evidence of a focal mass. 2.  Extensive increased signal intensity involving the periventricular white matter compatible with demyelinating disease versus vasculitis. 3.  Clinical  correlation and followup recommended. 4.  This reportedly flattened in the EPIC and the corpus callosum medical record system.    Mixed hyperlipidemia 11/9/2012    NAFLD (nonalcoholic fatty liver disease) 10/11/2013    CHASE (nonalcoholic steatohepatitis)     Obesity     Stroke     Type 2 diabetes mellitus with diabetic polyneuropathy, with long-term current use of insulin 5/14/2017    Type 2 diabetes mellitus with hyperglycemia, with long-term current use of insulin 10/11/2013       PAST SURGICAL HISTORY:   Past Surgical History:   Procedure Laterality Date    SKIN CANCER EXCISION         PAST SOCIAL HISTORY:   reports that he has never smoked. He has never used smokeless tobacco. He reports that he does not drink alcohol or use drugs.    FAMILY HISTORY:  Family History   Problem Relation Age of Onset    Heart disease Mother     Hypertension Mother     Heart failure Mother     Cancer Father         lung    Diabetes Maternal Aunt        CURRENT MEDICATIONS:   Current Outpatient Prescriptions   Medication Sig    alclomethasone (ACLOVATE) 0.05 % cream     aspirin (ECOTRIN) 81 MG EC tablet Take 81 mg by mouth once daily.    atenolol (TENORMIN) 50 MG tablet Take 1 tablet (50 mg total) by mouth once daily.    atorvastatin (LIPITOR) 40 MG tablet TAKE ONE TABLET BY MOUTH ONCE DAILY    blood sugar diagnostic (TRUE METRIX GLUCOSE TEST STRIP) Strp Test four times a day    blood sugar diagnostic Strp To check BG 4 times daily, to use with insurance preferred meter    blood-glucose meter kit To check BG 4 times daily, one touch verio meter. Dx code e11.65    diltiaZEM (DILACOR XR) 240 MG CDCR TAKE ONE CAPSULE BY MOUTH ONCE DAILY    insulin aspart U-100 (NOVOLOG) 100 unit/mL InPn pen Inject 20 Units into the skin 3 (three) times daily with meals.    insulin glargine (BASAGLAR KWIKPEN U-100 INSULIN) 100 unit/mL (3 mL) InPn pen Inject 40 Units into the skin every evening.    insulin syringe-needle U-100  "(INSULIN SYRINGE) 1/2 mL 30 gauge x 5/16 Syrg Use 3x/day    insulin syringe-needle U-100 0.5 mL 31 gauge x 5/16 Syrg     ketoconazole (NIZORAL) 2 % cream     lancets 30 gauge Misc Test four times a day    lancets Misc To check BG 4 times daily, one touch verio meter. Dx code e11.65    levETIRAcetam (KEPPRA) 500 MG Tab TAKE ONE TABLET BY MOUTH TWICE DAILY    liraglutide 0.6 mg/0.1 mL, 18 mg/3 mL, subq PNIJ (VICTOZA 3-TOMASZ) 0.6 mg/0.1 mL (18 mg/3 mL) PnIj Inject 1.8 mg into the skin once daily.    metFORMIN (GLUCOPHAGE-XR) 500 MG 24 hr tablet TAKE TWO TABLETS BY MOUTH TWICE DAILY WITH MEALS    NOVOFINE PLUS 32 gauge x 1/6" Ndle     ONETOUCH DELICA LANCETS 33 gauge Misc     pen needle, diabetic (BD ULTRA-FINE ANA PEN NEEDLE) 32 gauge x 5/32" Ndle use to inject insulin    pen needle, diabetic (BD ULTRA-FINE ANA PEN NEEDLE) 32 gauge x 5/32" Ndle Use one needle twice daily    sulfamethoxazole-trimethoprim 800-160mg (BACTRIM DS) 800-160 mg Tab Take every Monday, Wednesday, and Friday.  Infectious Disease will decide if this is needed in 3-4 months.    valsartan (DIOVAN) 320 MG tablet TAKE ONE TABLET BY MOUTH ONCE DAILY    venlafaxine (EFFEXOR-XR) 75 MG 24 hr capsule TAKE 1 CAPSULE BY MOUTH ONCE DAILY    vitamin D 1000 units Tab Take 5 tablets (5,000 Units total) by mouth once daily.    calcium carbonate (OS-DONNA) 500 mg calcium (1,250 mg) tablet Take 2 tablets (1,000 mg total) by mouth once.    omega-3 fatty acids-vitamin E (FISH OIL) 1,000 mg Cap Take 1 capsule by mouth 2 (two) times daily.     No current facility-administered medications for this visit.      ALLERGIES:   Review of patient's allergies indicates:  No Known Allergies    REVIEW OF SYSTEMS:   General ROS: Positive for fatigue.   Psychological ROS: Confusion much improved.   Ophthalmic ROS: negative  ENT ROS: negative  Allergy and Immunology ROS: negative  Hematological and Lymphatic ROS: negative  Endocrine ROS: negative  Respiratory ROS: " negative  Cardiovascular ROS: negative  Gastrointestinal ROS: negative  Genito-Urinary ROS: negative  Musculoskeletal ROS: chronic back pain, brace helps  Neurological ROS: see HPI  Dermatological ROS: negative    PHYSICAL EXAM:   Vitals:    06/12/18 1008   BP: 129/83   Pulse: 110   Resp: 18   Temp: 98.6 °F (37 °C)       General - well developed, well nourished; much more interactive today. A+O x3  Head & Face - no sinus tenderness  Eyes - normal conjunctivae and lids   ENT - normal external auditory canals, no mouth sores  Chest and Lung - normal respiratory effort, clear to auscultation bilaterally   Cardiovascular - RRR with no MGR, normal S1 and S2; no pedal edema  Abdomen -  soft, nontender, no palpable hepatomegaly or splenomegaly  Lymph - no palpable lymphadenopathy  Extremities - unremarkable nails and digits  Heme - no bruising, petechiae, pallor  Skin - no rashes or lesions  Psych - Normal mood and affect. No confusion noted.  MS- wearing brace    ECOG Performance Status: (foot note - ECOG PS provided by Eastern Cooperative Oncology Group) 1 - Symptomatic but completely ambulatory    Karnofsky Performance Score:  80%- Normal Activity with Effort: Some Symptoms of Disease  DATA:   Lab Results   Component Value Date    WBC 10.12 06/12/2018    HGB 12.0 (L) 06/12/2018    HCT 35.6 (L) 06/12/2018    MCV 82 06/12/2018     06/12/2018     Gran # (ANC)   Date Value Ref Range Status   06/12/2018 7.5 1.8 - 7.7 K/uL Final     Gran%   Date Value Ref Range Status   06/12/2018 73.8 (H) 38.0 - 73.0 % Final     CMP  Sodium   Date Value Ref Range Status   06/12/2018 141 136 - 145 mmol/L Final     Potassium   Date Value Ref Range Status   06/12/2018 3.3 (L) 3.5 - 5.1 mmol/L Final     Chloride   Date Value Ref Range Status   06/12/2018 104 95 - 110 mmol/L Final     CO2   Date Value Ref Range Status   06/12/2018 25 23 - 29 mmol/L Final     Glucose   Date Value Ref Range Status   06/12/2018 119 (H) 70 - 110 mg/dL Final      BUN, Bld   Date Value Ref Range Status   06/12/2018 14 6 - 20 mg/dL Final     Creatinine   Date Value Ref Range Status   06/12/2018 1.3 0.5 - 1.4 mg/dL Final     Calcium   Date Value Ref Range Status   06/12/2018 10.3 8.7 - 10.5 mg/dL Final     Total Protein   Date Value Ref Range Status   06/12/2018 7.5 6.0 - 8.4 g/dL Final     Albumin   Date Value Ref Range Status   06/12/2018 4.1 3.5 - 5.2 g/dL Final   10/11/2013 4.3 3.6 - 5.1 g/dL Final     Comment:     @ Test Performed By:  [facility] AMELIA Almazan M.D.,   [address] [place] [id number]  [facility] #[id number]     Total Bilirubin   Date Value Ref Range Status   06/12/2018 0.8 0.1 - 1.0 mg/dL Final     Comment:     For infants and newborns, interpretation of results should be based  on gestational age, weight and in agreement with clinical  observations.  Premature Infant recommended reference ranges:  Up to 24 hours.............<8.0 mg/dL  Up to 48 hours............<12.0 mg/dL  3-5 days..................<15.0 mg/dL  6-29 days.................<15.0 mg/dL       Alkaline Phosphatase   Date Value Ref Range Status   06/12/2018 102 55 - 135 U/L Final     AST   Date Value Ref Range Status   06/12/2018 26 10 - 40 U/L Final     ALT   Date Value Ref Range Status   06/12/2018 20 10 - 44 U/L Final     Anion Gap   Date Value Ref Range Status   06/12/2018 12 8 - 16 mmol/L Final     eGFR if    Date Value Ref Range Status   06/12/2018 >60.0 >60 mL/min/1.73 m^2 Final     eGFR if non    Date Value Ref Range Status   06/12/2018 59.3 (A) >60 mL/min/1.73 m^2 Final     Comment:     Calculation used to obtain the estimated glomerular filtration  rate (eGFR) is the CKD-EPI equation.            ASSESSMENT AND PLAN:   Encounter Diagnoses   Name Primary?    CNS vasculitis Yes    Cytopenia      CNS vasculitis  -proceed with dose #11  Cytoxan was increased to 750mg/m2 at dose +10 with mesna  support (mesna dose also increased) for CNS vasculitis, protocol per Dr. Love; improved symptoms at increased dose   -per neurology, plan for approximate 1 year of monthly therapy; Dr. Goldstein to discuss duration of therapy with Dr. Love after dose #12   -No contraindication to proceed with CTX today    Cytopenias  -mild anemia 2/2 chemotherapy  -WBC and plt normal today, hgb stable at 12.0  -Transfuse for Hgb<7 and Plt <10k, no indication to transfuse today  -Pt educated to notify us of any bleeding or fevers or S/S infection. Educated on the importance of using walker. Educated if he falls he could hit head and have a brain hemorrhage if low platelet count. Verbalizes understanding.     Follow Up:  -cbc, cmp, t+s, MD appt with Dr. Goldstein to discuss next plan in treatment, chair for #12 cytoxan infusion in 4 weeks     Pt and son know they need refills but are unsure of which medications they need. Pt's son will call back once he gets in touch with his mother to clarify which medications need to be refilled.    Plan per collaborating physician, Dr. Lobito Goldstein.    Kalyn Quarles, JOE, NP  Hematology/Oncology    Distress Screening Results: Psychosocial Distress screening score of Distress Score: 0 noted and reviewed. No intervention indicated.

## 2018-06-14 ENCOUNTER — OFFICE VISIT (OUTPATIENT)
Dept: NEUROLOGY | Facility: CLINIC | Age: 58
End: 2018-06-14
Payer: COMMERCIAL

## 2018-06-14 ENCOUNTER — OFFICE VISIT (OUTPATIENT)
Dept: PSYCHIATRY | Facility: CLINIC | Age: 58
End: 2018-06-14
Payer: COMMERCIAL

## 2018-06-14 ENCOUNTER — TELEPHONE (OUTPATIENT)
Dept: NEUROLOGY | Facility: CLINIC | Age: 58
End: 2018-06-14

## 2018-06-14 VITALS
HEIGHT: 70 IN | WEIGHT: 218.38 LBS | HEART RATE: 85 BPM | DIASTOLIC BLOOD PRESSURE: 69 MMHG | SYSTOLIC BLOOD PRESSURE: 104 MMHG | BODY MASS INDEX: 31.26 KG/M2

## 2018-06-14 DIAGNOSIS — Z71.89 COUNSELING REGARDING GOALS OF CARE: ICD-10-CM

## 2018-06-14 DIAGNOSIS — I77.6 VASCULITIS: Primary | ICD-10-CM

## 2018-06-14 DIAGNOSIS — I10 HYPERTENSION, UNSPECIFIED TYPE: ICD-10-CM

## 2018-06-14 DIAGNOSIS — E11.8 TYPE 2 DIABETES MELLITUS WITH COMPLICATION, WITH LONG-TERM CURRENT USE OF INSULIN: ICD-10-CM

## 2018-06-14 DIAGNOSIS — F32.A DEPRESSIVE DISORDER: Primary | ICD-10-CM

## 2018-06-14 DIAGNOSIS — R41.89 COGNITIVE IMPAIRMENT: ICD-10-CM

## 2018-06-14 DIAGNOSIS — Z63.8 FAMILY CONFLICT: ICD-10-CM

## 2018-06-14 DIAGNOSIS — E78.00 HIGH CHOLESTEROL: ICD-10-CM

## 2018-06-14 DIAGNOSIS — F02.818 MAJOR NEUROCOGNITIVE DISORDER DUE TO ANOTHER MEDICAL CONDITION WITH BEHAVIORAL DISTURBANCE: ICD-10-CM

## 2018-06-14 DIAGNOSIS — Z79.4 TYPE 2 DIABETES MELLITUS WITH COMPLICATION, WITH LONG-TERM CURRENT USE OF INSULIN: ICD-10-CM

## 2018-06-14 DIAGNOSIS — Z79.899 HIGH RISK MEDICATION USE: ICD-10-CM

## 2018-06-14 PROCEDURE — 3078F DIAST BP <80 MM HG: CPT | Mod: CPTII,S$GLB,, | Performed by: CLINICAL NURSE SPECIALIST

## 2018-06-14 PROCEDURE — 90847 FAMILY PSYTX W/PT 50 MIN: CPT | Mod: S$GLB,,, | Performed by: SOCIAL WORKER

## 2018-06-14 PROCEDURE — 99215 OFFICE O/P EST HI 40 MIN: CPT | Mod: S$GLB,,, | Performed by: CLINICAL NURSE SPECIALIST

## 2018-06-14 PROCEDURE — 3074F SYST BP LT 130 MM HG: CPT | Mod: CPTII,S$GLB,, | Performed by: CLINICAL NURSE SPECIALIST

## 2018-06-14 PROCEDURE — 99999 PR PBB SHADOW E&M-EST. PATIENT-LVL III: CPT | Mod: PBBFAC,,, | Performed by: CLINICAL NURSE SPECIALIST

## 2018-06-14 PROCEDURE — 3008F BODY MASS INDEX DOCD: CPT | Mod: CPTII,S$GLB,, | Performed by: CLINICAL NURSE SPECIALIST

## 2018-06-14 PROCEDURE — 3044F HG A1C LEVEL LT 7.0%: CPT | Mod: CPTII,S$GLB,, | Performed by: CLINICAL NURSE SPECIALIST

## 2018-06-14 SDOH — SOCIAL DETERMINANTS OF HEALTH (SDOH): OTHER SPECIFIED PROBLEMS RELATED TO PRIMARY SUPPORT GROUP: Z63.8

## 2018-06-14 NOTE — PROGRESS NOTES
"Subjective:       Patient ID: Bessy Nunez is a 58 y.o. male. He presents today for a routine clinic visit for CNS vasculitis; he is accompanied by his wife and son.  He was last seen in April 2018.       Neurologic Problem       He is receiving Cytoxan monthly that is being managed by hem-onc, Dr. Doron Goldstein. His Cytoxan dose was increased after last visit, and he has one more planned infusion (#12). His son reports that he is more engaged and initiates conversation over the past 2 weeks.    His wife reports that he is doing more for himself, showering, eating, etc without prompting.   His wife reports that this last round of Cytoxan showed an effect almost the whole month. About two days before, she noticed a decline in his cognition and he seemed to "shut down." This is an improvement, as the effect would previously last just a week or two,. The patient states that he feels good. His wife reports that his mobility is better. He denies any falls.     His son reports that he is lethargic at times (not much energy), but initiates activities for himself. His sleep schedule seems to be "messed up."     Bowels and bladder are normal.   He is doing counseling with Sharda.     He has been discharged from home health.   His wife is his primary caregiver.     He walks around the home for exercise.   He denies depression. His son reports that he has anxiety before appointments. He won't sleep the night before and will want to leave home very early to come to an appt.   He is not taking Keppra.     His wife is interested in respite care. SHE has a lot of anxiety and depression.     Review of Systems    As above.   Social History     Social History    Marital status:      Spouse name: N/A    Number of children: N/A    Years of education: N/A     Occupational History    unemployed      Social History Main Topics    Smoking status: Never Smoker    Smokeless tobacco: Never Used    Alcohol use No    Drug use: " No    Sexual activity: Not on file     Other Topics Concern    Not on file     Social History Narrative    No narrative on file     He lives with his wife.   He receives SSDI.   Objective:      Neurologic Exam    Physical Exam       25 foot timed walk: 10.3 seconds today WITHOUT assistance; was 11.2 seconds with rollator at last visit   He continues to have flat affect, but participates more in conversation than he has at previous visits.   Extraocular movements are intact.   Facial movements are normal. He has normal strength in upper and lower extremities. Rapid sequential movements are normal in the upper extremities. Vibratory sense is intact.  Reflexes are 2+ and symmetric throughout.      Romberg is positive, but only mild sway.   Finger to nose coordination is normal.   He is able to follow 3 step commands.     He knows the day of the week and year, but not the month. He knows the .     LABS  Lab Results   Component Value Date    WBC 10.12 06/12/2018    HGB 12.0 (L) 06/12/2018    HCT 35.6 (L) 06/12/2018    MCV 82 06/12/2018     06/12/2018     ANC=7.5    IMAGING:    Results for orders placed during the hospital encounter of 04/01/18   MRI Brain W WO Contrast    Narrative Procedure: MRI the brain with and without contrast.    Technique: Sagittal and axial T1, axial T2, axial FLAIR, axial gradient, axial diffusion, and axial, sagittal, and coronal postcontrast T1 images of the whole brain. 10.0 ml of Gadavist injected intravenously.    Comparison: 9/14/17    Findings: Age-appropriate generalized cerebral volume loss. There are scattered foci of  T2/FLAIR signal hyperintensity in the supratentorial white matter without corresponding diffusion signal abnormality or enhancement. These are nonspecific and suggestive for moderate/severe degree of chronic microvascular ischemic change.  Remote lacunar type infarctions are seen in the bilateral basal ganglia and bilateral  cerebellar hemispheres.  There is no restricted diffusion to suggest acute infarction. No abnormal parenchymal enhancement.  A small developmental venous anomaly is seen in the right parieto-occipital region.  Ventricles normal in size and configuration without hydrocephalus. No midline shift or mass effect. No abnormal parenchymal gradient susceptibility. The major intracranial T2 flow-voids are present.    Impression  Age-appropriate generalized volume loss with scattered foci of T2/FLAIR signal abnormality within the supratentorial white matter and jose while nonspecific suggestive for moderate to severe degree of  chronic microvascular ischemic change.    No evidence for acute infarction or enhancing lesion.      Electronically signed by: Leida Garcia MD  Date:     04/02/18  Time:    14:41        Assessment:     1. CNS Vasculitis  2. Cognitive dysfunction    Plan:       1. Continue monthly pulse Cytoxan for one more dose. Will discuss plan to continue with Dr. Love.     2. Recommend home exercises--walking and home exercise PT program    3. Continue counseling with Sharda Gonzalez LCSW    4. Referral to Dr. Mace in primary care at [facility]      Over 50% of this 35 minute visit was spent discussing the patien's diagnosis of CNS vasculitis, current symptoms, and plans for Cytoxan. The patient and his wife agree with the plan of care. I will see him back in 2 months, and he will follow up with Dr. Love in October.     Beti Boswell, COURTNEY-BC, MSCN

## 2018-06-15 NOTE — PROGRESS NOTES
"Family Psychotherapy (PhD/LCSW)    6/14/2018    Site: [facility]    Length of service: 50    Therapeutic intervention: 90847-Family therapy with patient; needed because Bessy has neurocognitive disorder and needs support of family to be able to implement any changes/recommendations    Persons present: Bessy, wife Bekah, son Marky    Interval history: Bessy and family arrived to session, all dressed casually and with adequate hygiene.  Bessy was slightly less oriented because he was tired and had been at the [facility] center for a few hours, already. Bekah and Marky remarked that "talking with you you seems to be helping" and explained that Bessy has been more agreeable to activities.  He did go with Bekah to [place] this past weekend, though he originally said he would not go, and he's been walking for exercise (with encouragement and reminders).  Today, session focused on Bekah's depression and how this is concerning to both Bessy & Enrique.  Bekah talked openly about not wanting to leave the house, feeling anxious when leaving and socially isolating herself, experiencing fatigue, etc.  She also described how difficult it was for her when Bessy had his stroke as she became his full-time caregiver and had to stop working.  Previously, the couple had lived fairly independent lives - working different schedules then connecting "for the fun times" on the weekend.  Bekah misses working and having time to herself.  Bessy was supportive of Bekah as she shared her experience, stroking her arm and shoulder.  Marky praised Bekah for being an "incredible woman and mother" but also encouraged her to seek help.  As previously discussed, JULIAN provided Bekah with mental health resources and agreed to assist her further on Friday with setting up appointments.  JULAIN also provided the family with information on the [facility] and COA activities.  JULIAN also provided information on private duty aide " services in their area.  During this discussion, Bekah shared that pt is still eligible for the Long Term Personal Care Services waiver through Medicaid, even though his Medicaid is inactive.  She is working with Paula of [facility] to activate the services, which would give her respite/support 26 hours/week.  SW encouraged immediate follow up with Paula, and Bekah did phone her during session.  She's waiting on some paperwork.      Target symptoms: recurrent depression, adjustment     Patient's interpersonal/verbal exchanges: 63236 -Family therapy with patient.  Frequent interruptions between family members, difficulty staying focused on issues.  Positive use of humor.  Active listening is improving and family members using positive compliments and positive physical touch.    Progress toward goals: progressing slowly    Diagnosis:  F32.9 Unspecified Depressive Disorder    F02.81 Major neurocognitive disorder due to another medical condition with behavioral disturbance    Z63.8 Family Conflict     Plan: individual psychotherapy  family psychotherapy   SW to located psychiatric services for Bekah to evaluate depression and anxiety and will assist her to call some places, tomorrow.  SW provided family with private aide resources, COA activities and resources.  Family working on activating LTPCS waiver.    Return to clinic: 1 week

## 2018-06-18 ENCOUNTER — PATIENT OUTREACH (OUTPATIENT)
Dept: ADMINISTRATIVE | Facility: HOSPITAL | Age: 58
End: 2018-06-18

## 2018-06-18 DIAGNOSIS — Z13.5 DIABETIC RETINOPATHY SCREENING: ICD-10-CM

## 2018-06-18 NOTE — PROGRESS NOTES
[facility] is committed to your overall health.  To help you get the most out of each of your visits, we will review your information to make sure you are up to date on all of your recommended tests and/or procedures.       Your PCP   found that you may be due for:       Health Maintenance Due   Topic Date Due    TETANUS VACCINE  02/10/1976    Pneumococcal PPSV23 (Medium Risk) (1) 02/10/1976    Colonoscopy  02/21/2017    PROSTATE-SPECIFIC ANTIGEN  04/07/2017    Eye Exam  05/30/2018             If you have had any of the above done at another facility, please bring the records or information with you so that your record at [facility] will be complete.  If you would like to schedule any of these, please contact me.     If you are currently taking medication, please bring it with you to your appointment for review.     Also, if you have any type of Advanced Directives, please bring them with you to your office visit so we may scan them into your chart.       Thank you for Choosing [facility] for your healthcare needs.        Additional Information  If you have questions, you can email [email or web address] or call [phone]  to talk to our [facility] staff. Remember, [facility] is NOT to be used for urgent needs. For medical emergencies, dial 911.

## 2018-06-20 RX ORDER — ATENOLOL 50 MG/1
TABLET ORAL
Qty: 90 TABLET | Refills: 0 | OUTPATIENT
Start: 2018-06-20

## 2018-06-21 ENCOUNTER — OFFICE VISIT (OUTPATIENT)
Dept: PSYCHIATRY | Facility: CLINIC | Age: 58
End: 2018-06-21
Payer: COMMERCIAL

## 2018-06-21 ENCOUNTER — TELEPHONE (OUTPATIENT)
Dept: NEUROLOGY | Facility: CLINIC | Age: 58
End: 2018-06-21

## 2018-06-21 DIAGNOSIS — R26.89 BALANCE PROBLEM: ICD-10-CM

## 2018-06-21 DIAGNOSIS — R26.9 ABNORMAL GAIT: ICD-10-CM

## 2018-06-21 DIAGNOSIS — R41.89 COGNITIVE IMPAIRMENT: ICD-10-CM

## 2018-06-21 DIAGNOSIS — Z63.8 FAMILY CONFLICT: ICD-10-CM

## 2018-06-21 DIAGNOSIS — I77.6 VASCULITIS: Primary | ICD-10-CM

## 2018-06-21 DIAGNOSIS — F32.A DEPRESSIVE DISORDER: Primary | ICD-10-CM

## 2018-06-21 DIAGNOSIS — F02.80 MAJOR NEUROCOGNITIVE DISORDER DUE TO ANOTHER MEDICAL CONDITION WITHOUT BEHAVIORAL DISTURBANCE: ICD-10-CM

## 2018-06-21 PROCEDURE — 90847 FAMILY PSYTX W/PT 50 MIN: CPT | Mod: S$GLB,,, | Performed by: SOCIAL WORKER

## 2018-06-21 RX ORDER — SERTRALINE HYDROCHLORIDE 100 MG/1
TABLET, FILM COATED ORAL
Qty: 45 TABLET | Refills: 11 | OUTPATIENT
Start: 2018-06-21

## 2018-06-21 SDOH — SOCIAL DETERMINANTS OF HEALTH (SDOH): OTHER SPECIFIED PROBLEMS RELATED TO PRIMARY SUPPORT GROUP: Z63.8

## 2018-06-21 NOTE — PROGRESS NOTES
Family Psychotherapy (PhD/LCSW)    6/21/2018    Site: [facility]    Length of service: 50    Therapeutic intervention: 76981-Family therapy with patient; needed because Bessy has neurocognitive disorder and needs support of family to be able to implement any changes/recommendations    Persons present: Bessy and wife Bekah    Interval history: Bessy and Bekah arrived to session both dressed casually and with adequate hygiene, though Bessy's was poorer than in previous sessions (hair and facial hair not trimmed).  Bessy was engaged in session, though slightly more distracted this week.  Bekah shared that they have spent more time in bed this week since Marky has been away.  Bessy is not walking and is no longer receiving home health services; his gait appears slightly worse, today.  Followed up on topics that we discussed, last week.  First, Bekah did make an appointment with her PCP to discuss her depression and the PCP will refer her to a local therapist/psychiatrist.  Next, she states that Bessy has committed to visiting family in [place] every Friday, which is improvement.  SW challenged the couple to incorporate one additional day of activity into their weekly schedule as a step toward battling depression and improving health.  The couple agreed that they would either take a drive or walk or spend time upstairs (Bekah hasn't been upstairs for 6 months and Bessy for 12 mos) in their hobby rooms for at least 20-30 minutes every Tuesday.  They were also open to a referral for outpatient physical therapy at [facility] in [place].  SW will discuss this referral with provider.      Target symptoms: recurrent depression, adjustment     Patient's interpersonal/verbal exchanges: 63743 -Family therapy with patient.  Frequent interruptions between family members, difficulty staying focused on issues.  Positive use of humor.  Active listening is improving and family members using positive compliments and positive  physical touch.    Progress toward goals: progressing slowly    Diagnosis:  F32.9 Unspecified Depressive Disorder    F02.80 Major neurocognitive disorder due to another medical condition without behavioral disturbance    Z63.8 Family Conflict     Plan: individual psychotherapy  family psychotherapy   Pt's wife to see PCP and accept referral from PCP to behavioral health provider for assessment and treatment of depression.  Couple to increase activities outside/inside home to 2/week.    SW to follow up on LTPCS Waiver for pt, discuss referral to outpatient physical therapy, and request refill on Zoloft.    Return to clinic: 1 week

## 2018-06-21 NOTE — TELEPHONE ENCOUNTER
----- Message from Sharda Gonzalez, LCSW-BACS sent at 6/21/2018  3:59 PM CDT -----  Regarding: Zoloft & PT referral  Hi Beti,  I was going to see if you could refill Zoloft, but looks like wife called Dr. Nova at [facility], today, and refill is in process.  We discussed referral for outpatient physical therapy at [facility] in [place]:  [place]  [id number], [place] [address]  Contact us  [phone] office  [phone] fax    Would you be agreeable?  I think it will help him get out more, exercise, and improve gait/balance, etc.      Sharda

## 2018-06-22 ENCOUNTER — TELEPHONE (OUTPATIENT)
Dept: NEUROLOGY | Facility: CLINIC | Age: 58
End: 2018-06-22

## 2018-06-22 ENCOUNTER — TELEPHONE (OUTPATIENT)
Dept: INTERNAL MEDICINE | Facility: CLINIC | Age: 58
End: 2018-06-22

## 2018-06-22 ENCOUNTER — TELEPHONE (OUTPATIENT)
Dept: PSYCHIATRY | Facility: CLINIC | Age: 58
End: 2018-06-22

## 2018-06-22 DIAGNOSIS — F32.A DEPRESSION, UNSPECIFIED DEPRESSION TYPE: Primary | ICD-10-CM

## 2018-06-22 RX ORDER — SERTRALINE HYDROCHLORIDE 25 MG/1
TABLET, FILM COATED ORAL
Qty: 105 TABLET | Refills: 0 | Status: SHIPPED | OUTPATIENT
Start: 2018-06-22 | End: 2018-08-01 | Stop reason: ALTCHOICE

## 2018-06-22 NOTE — TELEPHONE ENCOUNTER
During recent visit pt's wife requested refill on Zoloft, which had been discontinued earlier this year.  Pt has been taking Zoloft and Effexor XR together.  Discussed this with GABRIEL Richardson, CNS and she is discontinuing by gradual taper.  Directions relayed to pt's wife by phone and RX was sent by provider to [facility].  With wife's permission this information was also relayed to pt's son, Marky.  Geno Reyes NP, pt's primary care provider, notified.

## 2018-06-22 NOTE — TELEPHONE ENCOUNTER
No answer. Mailbox is full           ----- Message from Geno Reyes NP sent at 6/22/2018  9:33 AM CDT -----  This patient had a request for zoloft refill.... He has effexor also on his med card, Can we find out what he needs refill on and which one he is taking, He should not be on both  ----- Message -----  From: Edgar Nova MD  Sent: 6/21/2018   5:03 PM  To: Geno Reyes NP, #    Pt sees Geno Reyes for primary care now I believe.  He is on effexor apparently (or should be).    Edgar Nova    ----- Message -----  From: Sharda Gonzalez [facility]  Sent: 6/21/2018   4:02 PM  To: Rad Leslie PharmD, #    Looks like Dr. Nova's office is handling this request.    Dr. Nova - Pt/wife requests refill be sent to [facility], not [facility].    ----- Message -----  From: Michelle Yañez  Sent: 6/21/2018   3:34 PM  To: Rad Leslie PharmD, #    Patient came in today looking for a prescription for zoloft. Could you send a prescription to our pharmacy for the patient. Thank you. Have a good day.

## 2018-06-25 ENCOUNTER — DOCUMENTATION ONLY (OUTPATIENT)
Dept: NEUROLOGY | Facility: CLINIC | Age: 58
End: 2018-06-25

## 2018-06-25 ENCOUNTER — TELEPHONE (OUTPATIENT)
Dept: PSYCHIATRY | Facility: CLINIC | Age: 58
End: 2018-06-25

## 2018-06-25 NOTE — TELEPHONE ENCOUNTER
----- Message from Feli Stafford sent at 6/25/2018 12:32 PM CDT -----  Contact: Marky (son) @ [phone]  Asking for information on the home health, wanting a contact number. Asking if home health has made it to their home. Please call.

## 2018-06-26 ENCOUNTER — TELEPHONE (OUTPATIENT)
Dept: PSYCHIATRY | Facility: CLINIC | Age: 58
End: 2018-06-26

## 2018-06-26 NOTE — TELEPHONE ENCOUNTER
Left voicemail for pt's wife to call re: outcome of visit from home care company that will provide Medicaid LTPCS waiver services.

## 2018-06-27 ENCOUNTER — OFFICE VISIT (OUTPATIENT)
Dept: PSYCHIATRY | Facility: CLINIC | Age: 58
End: 2018-06-27
Payer: COMMERCIAL

## 2018-06-27 DIAGNOSIS — F32.A DEPRESSIVE DISORDER: Primary | ICD-10-CM

## 2018-06-27 DIAGNOSIS — F02.818 MAJOR NEUROCOGNITIVE DISORDER DUE TO ANOTHER MEDICAL CONDITION WITH BEHAVIORAL DISTURBANCE: ICD-10-CM

## 2018-06-27 DIAGNOSIS — Z63.8 FAMILY CONFLICT: ICD-10-CM

## 2018-06-27 PROCEDURE — 90847 FAMILY PSYTX W/PT 50 MIN: CPT | Mod: S$GLB,,, | Performed by: SOCIAL WORKER

## 2018-06-27 SDOH — SOCIAL DETERMINANTS OF HEALTH (SDOH): OTHER SPECIFIED PROBLEMS RELATED TO PRIMARY SUPPORT GROUP: Z63.8

## 2018-06-27 NOTE — PROGRESS NOTES
"Family Psychotherapy (PhD/LCSW)    6/27/2018    Site: [facility]    Length of service: 50    Therapeutic intervention: 90523-Family therapy with patient; needed because Bessy has neurocognitive disorder and needs support of family to be able to implement any changes/recommendations    Persons present: Bessy and son Marky    Interval history: This was supposed to be a full family session, but Bessy and Marky explained that Bekah did not want to come, today.  Both report that she is drinking frequently and that she and Bessy argue frequently.  Bessy presented with poorer hygiene, today - his head and facial hair were tousled and overgrown, whereas normally he is clean shaven.  Marky explained that Bessy has become increasingly argumentative and is refusing some care, but he also explained that Bekah is instigating and provoking Bessy.  Marky and Bessy also explained that Bekah is drinking heavily, "waking up and drinking and then going back to bed".  On a positive note, Bessy explained that the [facility] agency came to visit him on Monday and that he'll have help M-F.  He could not recall the details of the conversation and states Bekah was asleep.  Marky explained that Bekah had a difficult morning with Bessy and almost cancelled the home visit.  Additionally, [facility] will be [facility] for outpatient physical therapy.  During our discussion Marky mentioned concern about change in Bessy's behavior being related to the change in pt's Zoloft dose, which seemed unlikely given the dose would have only decreased by 25mg.  However, this prompted further discussion about whether or not Bessy was actually taking Zoloft, as reported by Bekah.  SW agreed to follow up with medical providers.      Target symptoms: recurrent depression, adjustment     Patient's interpersonal/verbal exchanges: 12819 -Family therapy with patient.  Family currently experiencing crisis; wife did not attend.  Bessy and Marky communicated " respectfully during session.      Progress toward goals: progressing slowly, currently family is experiencing a crisis    Diagnosis:  F32.9 Unspecified Depressive Disorder    F02.80 Major neurocognitive disorder due to another medical condition without behavioral disturbance    Z63.8 Family Conflict     Plan: individual psychotherapy  family psychotherapy   Pt's wife scheduled to see behavioral health provider for assessment and treatment of depression.  Couple to increase activities outside/inside home to 2/week - not meeting goal currently.  SW to follow up on LTPCS Waiver for pt to confirm start date.  Pt to start physical therapy.   SW to follow up on concerns about Zoloft.    Return to clinic: 3 weeks

## 2018-06-28 ENCOUNTER — TELEPHONE (OUTPATIENT)
Dept: PSYCHIATRY | Facility: CLINIC | Age: 58
End: 2018-06-28

## 2018-06-28 NOTE — TELEPHONE ENCOUNTER
Discussed Zoloft concerns that were raised during pt's most recent follow up visit, with his wife, by phone.  She confirmed, again, that pt was definitely taking Zoloft and that his home health team was aware he was taking the medication even though it was not listed on his medication list.  She shared he has started the taper and is currently taking 125mg.

## 2018-06-29 ENCOUNTER — PATIENT MESSAGE (OUTPATIENT)
Dept: INTERNAL MEDICINE | Facility: CLINIC | Age: 58
End: 2018-06-29

## 2018-07-02 ENCOUNTER — TELEPHONE (OUTPATIENT)
Dept: INTERNAL MEDICINE | Facility: CLINIC | Age: 58
End: 2018-07-02

## 2018-07-02 DIAGNOSIS — F32.A DEPRESSION, UNSPECIFIED DEPRESSION TYPE: ICD-10-CM

## 2018-07-02 RX ORDER — VENLAFAXINE HYDROCHLORIDE 75 MG/1
75 CAPSULE, EXTENDED RELEASE ORAL DAILY
Qty: 30 CAPSULE | Refills: 1 | Status: SHIPPED | OUTPATIENT
Start: 2018-07-02 | End: 2018-08-01 | Stop reason: SDUPTHER

## 2018-07-02 NOTE — TELEPHONE ENCOUNTER
Spoke to his wife Bekah. She stated he is taking both at the moment. He is weaning off the Zoloft.       ----- Message from Geno Reyes NP sent at 7/1/2018  6:03 PM CDT -----  Please try to contact wife, is care taker and make sure she received the message. Should not be on both effexor and zoloft  ----- Message -----  From: Sharda Gonzalez [facility]  Sent: 6/22/2018   2:55 PM  To: Geno Reyes NP, Gilles GrantD, #    Beti Boswell just sent in a RX for Zoloft to [facility].  Pt has been taking Zoloft and Effexor ER, together, for a few months as wife did not realized Effexor was meant to replace Zoloft.  Beti provided instructions for a taper and I will relay this to the pt's wife.      ----- Message -----  From: Edgar Nova MD  Sent: 6/21/2018   5:03 PM  To: Geno Reyes NP, #    Pt sees Geno Reyes for primary care now I believe.  He is on effexor apparently (or should be).    Edgar Nova    ----- Message -----  From: Sharda Gonzalez [facility]  Sent: 6/21/2018   4:02 PM  To: Rad Leslie PharmD, #    Looks like Dr. Nova's office is handling this request.    Dr. Nova - Pt/wife requests refill be sent to [facility] pharmacy, not [facility].    ----- Message -----  From: Michelle Yañez  Sent: 6/21/2018   3:34 PM  To: Rad Leslie PharmD, #    Patient came in today looking for a prescription for zoloft. Could you send a prescription to our pharmacy for the patient. Thank you. Have a good day.

## 2018-07-11 ENCOUNTER — INFUSION (OUTPATIENT)
Dept: INFUSION THERAPY | Facility: HOSPITAL | Age: 58
End: 2018-07-11
Attending: INTERNAL MEDICINE
Payer: COMMERCIAL

## 2018-07-11 ENCOUNTER — LAB VISIT (OUTPATIENT)
Dept: LAB | Facility: HOSPITAL | Age: 58
End: 2018-07-11
Attending: INTERNAL MEDICINE
Payer: COMMERCIAL

## 2018-07-11 ENCOUNTER — OFFICE VISIT (OUTPATIENT)
Dept: HEMATOLOGY/ONCOLOGY | Facility: CLINIC | Age: 58
End: 2018-07-11
Payer: COMMERCIAL

## 2018-07-11 VITALS
HEART RATE: 97 BPM | DIASTOLIC BLOOD PRESSURE: 76 MMHG | BODY MASS INDEX: 31.16 KG/M2 | HEIGHT: 70 IN | SYSTOLIC BLOOD PRESSURE: 111 MMHG | TEMPERATURE: 99 F | OXYGEN SATURATION: 97 % | WEIGHT: 217.63 LBS

## 2018-07-11 VITALS
HEART RATE: 93 BPM | TEMPERATURE: 47 F | HEIGHT: 70 IN | SYSTOLIC BLOOD PRESSURE: 112 MMHG | BODY MASS INDEX: 31.07 KG/M2 | RESPIRATION RATE: 20 BRPM | DIASTOLIC BLOOD PRESSURE: 71 MMHG | WEIGHT: 217 LBS

## 2018-07-11 DIAGNOSIS — T45.1X5A CHEMOTHERAPY-INDUCED NEUTROPENIA: ICD-10-CM

## 2018-07-11 DIAGNOSIS — D70.1 CHEMOTHERAPY-INDUCED NEUTROPENIA: ICD-10-CM

## 2018-07-11 DIAGNOSIS — D64.81 ANEMIA ASSOCIATED WITH CHEMOTHERAPY: ICD-10-CM

## 2018-07-11 DIAGNOSIS — T45.1X5A ANEMIA ASSOCIATED WITH CHEMOTHERAPY: ICD-10-CM

## 2018-07-11 DIAGNOSIS — I77.6 CNS VASCULITIS: Primary | ICD-10-CM

## 2018-07-11 DIAGNOSIS — Z51.11 ENCOUNTER FOR CHEMOTHERAPY MANAGEMENT: ICD-10-CM

## 2018-07-11 LAB
ABO + RH BLD: NORMAL
ALBUMIN SERPL BCP-MCNC: 4.1 G/DL
ALP SERPL-CCNC: 88 U/L
ALT SERPL W/O P-5'-P-CCNC: 18 U/L
ANION GAP SERPL CALC-SCNC: 10 MMOL/L
AST SERPL-CCNC: 24 U/L
BASOPHILS # BLD AUTO: 0.06 K/UL
BASOPHILS NFR BLD: 0.6 %
BILIRUB SERPL-MCNC: 0.7 MG/DL
BLD GP AB SCN CELLS X3 SERPL QL: NORMAL
BUN SERPL-MCNC: 20 MG/DL
CALCIUM SERPL-MCNC: 9.7 MG/DL
CHLORIDE SERPL-SCNC: 105 MMOL/L
CO2 SERPL-SCNC: 22 MMOL/L
CREAT SERPL-MCNC: 1.2 MG/DL
DIFFERENTIAL METHOD: ABNORMAL
EOSINOPHIL # BLD AUTO: 0.2 K/UL
EOSINOPHIL NFR BLD: 1.6 %
ERYTHROCYTE [DISTWIDTH] IN BLOOD BY AUTOMATED COUNT: 15.7 %
EST. GFR  (AFRICAN AMERICAN): >60 ML/MIN/1.73 M^2
EST. GFR  (NON AFRICAN AMERICAN): >60 ML/MIN/1.73 M^2
GLUCOSE SERPL-MCNC: 85 MG/DL
HCT VFR BLD AUTO: 32.7 %
HGB BLD-MCNC: 11 G/DL
IMM GRANULOCYTES # BLD AUTO: 0.05 K/UL
IMM GRANULOCYTES NFR BLD AUTO: 0.5 %
LYMPHOCYTES # BLD AUTO: 1.6 K/UL
LYMPHOCYTES NFR BLD: 14.8 %
MCH RBC QN AUTO: 28.3 PG
MCHC RBC AUTO-ENTMCNC: 33.6 G/DL
MCV RBC AUTO: 84 FL
MONOCYTES # BLD AUTO: 1 K/UL
MONOCYTES NFR BLD: 9 %
NEUTROPHILS # BLD AUTO: 7.9 K/UL
NEUTROPHILS NFR BLD: 73.5 %
NRBC BLD-RTO: 0 /100 WBC
PLATELET # BLD AUTO: 179 K/UL
PMV BLD AUTO: 9.3 FL
POTASSIUM SERPL-SCNC: 3.9 MMOL/L
PROT SERPL-MCNC: 7.5 G/DL
RBC # BLD AUTO: 3.89 M/UL
SODIUM SERPL-SCNC: 137 MMOL/L
WBC # BLD AUTO: 10.71 K/UL

## 2018-07-11 PROCEDURE — 80053 COMPREHEN METABOLIC PANEL: CPT

## 2018-07-11 PROCEDURE — 99999 PR PBB SHADOW E&M-EST. PATIENT-LVL III: CPT | Mod: PBBFAC,,, | Performed by: NURSE PRACTITIONER

## 2018-07-11 PROCEDURE — 86901 BLOOD TYPING SEROLOGIC RH(D): CPT

## 2018-07-11 PROCEDURE — 99215 OFFICE O/P EST HI 40 MIN: CPT | Mod: S$GLB,,, | Performed by: NURSE PRACTITIONER

## 2018-07-11 PROCEDURE — 85025 COMPLETE CBC W/AUTO DIFF WBC: CPT

## 2018-07-11 PROCEDURE — 96367 TX/PROPH/DG ADDL SEQ IV INF: CPT

## 2018-07-11 PROCEDURE — 3008F BODY MASS INDEX DOCD: CPT | Mod: CPTII,S$GLB,, | Performed by: NURSE PRACTITIONER

## 2018-07-11 PROCEDURE — 25000003 PHARM REV CODE 250: Performed by: INTERNAL MEDICINE

## 2018-07-11 PROCEDURE — 96375 TX/PRO/DX INJ NEW DRUG ADDON: CPT

## 2018-07-11 PROCEDURE — 3074F SYST BP LT 130 MM HG: CPT | Mod: CPTII,S$GLB,, | Performed by: NURSE PRACTITIONER

## 2018-07-11 PROCEDURE — 96413 CHEMO IV INFUSION 1 HR: CPT

## 2018-07-11 PROCEDURE — 3078F DIAST BP <80 MM HG: CPT | Mod: CPTII,S$GLB,, | Performed by: NURSE PRACTITIONER

## 2018-07-11 PROCEDURE — 63600175 PHARM REV CODE 636 W HCPCS: Mod: JG | Performed by: INTERNAL MEDICINE

## 2018-07-11 RX ORDER — HEPARIN 100 UNIT/ML
500 SYRINGE INTRAVENOUS
Status: CANCELLED | OUTPATIENT
Start: 2018-07-11

## 2018-07-11 RX ORDER — SODIUM CHLORIDE 0.9 % (FLUSH) 0.9 %
10 SYRINGE (ML) INJECTION
Status: CANCELLED | OUTPATIENT
Start: 2018-07-11

## 2018-07-11 RX ORDER — ONDANSETRON 2 MG/ML
8 INJECTION INTRAMUSCULAR; INTRAVENOUS
Status: CANCELLED
Start: 2018-07-11 | End: 2018-07-11

## 2018-07-11 RX ORDER — ONDANSETRON HCL IN 0.9 % NACL 8 MG/50 ML
8 INTRAVENOUS SOLUTION, PIGGYBACK (ML) INTRAVENOUS
Status: COMPLETED | OUTPATIENT
Start: 2018-07-11 | End: 2018-07-11

## 2018-07-11 RX ADMIN — CYCLOPHOSPHAMIDE 1690 MG: 1 INJECTION, POWDER, FOR SOLUTION INTRAVENOUS; ORAL at 12:07

## 2018-07-11 RX ADMIN — MESNA 1688 MG: 100 INJECTION, SOLUTION INTRAVENOUS at 12:07

## 2018-07-11 RX ADMIN — DEXAMETHASONE SODIUM PHOSPHATE: 4 INJECTION, SOLUTION INTRA-ARTICULAR; INTRALESIONAL; INTRAMUSCULAR; INTRAVENOUS; SOFT TISSUE at 12:07

## 2018-07-11 RX ADMIN — ONDANSETRON HYDROCHLORIDE 8 MG: 2 INJECTION, SOLUTION INTRAMUSCULAR; INTRAVENOUS at 11:07

## 2018-07-11 NOTE — PLAN OF CARE
Problem: Patient Care Overview  Goal: Plan of Care Review  Outcome: Ongoing (interventions implemented as appropriate)  Tolerated chemo infusion without difficulty. No complaints voiced. AVS given to pt. Instructed to notify MD with any concerns or problems. Pt verbalized understanding. Strongly encouraged to drink plenty of fluids.

## 2018-07-11 NOTE — PROGRESS NOTES
SECTION OF HEMATOLOGY AND BONE MARROW TRANSPLANT    07/11/2018  Referred by:  Dr. Love  Referred for: Cytoxan    CHIEF COMPLAINT:   Chief Complaint   Patient presents with    CNS vasculitis       HISTORY OF PRESENT ILLNESS:   Presents today to clinic with son for his final dose (#12) of Cytoxan for CNS Vasculitis. Significant improvement in neuro symptoms/mentation with increase in CTX dose to 750 mg/m2. Tolerating ctx well.  Denies fever, chills, nightsweats, bleeding, bruising, lymphadenopathy, n/v/d/c, signs/symptoms of splenomegaly. Does report chronic back pain, worse when riding in car.      PAST MEDICAL HISTORY:   Past Medical History:   Diagnosis Date    CNS vasculitis 6/2/2017    Follows with Dr. Love By mri.  Several active lesions, many old. 5/13: MRA brain/neck, MRI brain w/wo contrast show no vascular occlusion, multiple foci of hyperintensity in deep cerebral periventricular white matter in pattern of demyelinating process.  5/17: MRI spine performed, no spinal cord lesions. Hospitalization 6/2017. EEG was performed negative for seizures.  Repeat MRI showing new lesion, question whether this was demyelination versus CVA. 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 Findings compatible with a small acute lacunar infarct adjacent to the left frontal horn.  Nonspecific enhancement just inferior to this region and extending into the left basal ganglia, as well as within the inferior aspect of the left cerebellum.  No evidence of a focal mass. 2.  Extensive increased signal intensity involving the periventricular white matter compatible with demyelinating disease versus vasculitis. 3.  Clinical correlation and followup recommended. 4.  This reportedly flattened in the EPIC and the corpus  [facility] medical record system.    Mixed hyperlipidemia 11/9/2012    NAFLD (nonalcoholic fatty liver disease) 10/11/2013    CHASE (nonalcoholic steatohepatitis)     Obesity     Stroke     Type 2 diabetes mellitus with diabetic polyneuropathy, with long-term current use of insulin 5/14/2017    Type 2 diabetes mellitus with hyperglycemia, with long-term current use of insulin 10/11/2013       PAST SURGICAL HISTORY:   Past Surgical History:   Procedure Laterality Date    SKIN CANCER EXCISION         PAST SOCIAL HISTORY:   reports that he has never smoked. He has never used smokeless tobacco. He reports that he does not drink alcohol or use drugs.    FAMILY HISTORY:  Family History   Problem Relation Age of Onset    Heart disease Mother     Hypertension Mother     Heart failure Mother     Cancer Father         lung    Diabetes Maternal Aunt        CURRENT MEDICATIONS:   Current Outpatient Prescriptions   Medication Sig    alclomethasone (ACLOVATE) 0.05 % cream     aspirin (ECOTRIN) 81 MG EC tablet Take 81 mg by mouth once daily.    atenolol (TENORMIN) 50 MG tablet Take 1 tablet (50 mg total) by mouth once daily.    atorvastatin (LIPITOR) 40 MG tablet TAKE ONE TABLET BY MOUTH ONCE DAILY    blood sugar diagnostic (TRUE METRIX GLUCOSE TEST STRIP) Strp Test four times a day    blood sugar diagnostic Strp To check BG 4 times daily, to use with insurance preferred meter    blood-glucose meter kit To check BG 4 times daily, one touch verio meter. Dx code e11.65    calcium carbonate (OS-DONNA) 500 mg calcium (1,250 mg) tablet Take 2 tablets (1,000 mg total) by mouth once.    diltiaZEM (DILACOR XR) 240 MG CDCR TAKE ONE CAPSULE BY MOUTH ONCE DAILY    insulin aspart U-100 (NOVOLOG) 100 unit/mL InPn pen Inject 20 Units into the skin 3 (three) times daily with meals.    insulin glargine (BASAGLAR KWIKPEN U-100 INSULIN) 100 unit/mL (3 mL) InPn pen Inject 40 Units into the skin every evening.    insulin  "syringe-needle U-100 (INSULIN SYRINGE) 1/2 mL 30 gauge x 5/16 Syrg Use 3x/day    insulin syringe-needle U-100 0.5 mL 31 gauge x 5/16 Syrg     ketoconazole (NIZORAL) 2 % cream     lancets 30 gauge Misc Test four times a day    lancets Misc To check BG 4 times daily, one touch verio meter. Dx code e11.65    liraglutide 0.6 mg/0.1 mL, 18 mg/3 mL, subq PNIJ (VICTOZA 3-TOMASZ) 0.6 mg/0.1 mL (18 mg/3 mL) PnIj Inject 1.8 mg into the skin once daily.    metFORMIN (GLUCOPHAGE-XR) 500 MG 24 hr tablet TAKE TWO TABLETS BY MOUTH TWICE DAILY WITH MEALS    NOVOFINE PLUS 32 gauge x 1/6" Ndle     omega-3 fatty acids-vitamin E (FISH OIL) 1,000 mg Cap Take 1 capsule by mouth 2 (two) times daily.    ondansetron (ZOFRAN) 4 MG tablet Take 1 tablet (4 mg total) by mouth every 6 (six) hours as needed for Nausea.    ONETOUCH DELICA LANCETS 33 gauge Misc     pen needle, diabetic (BD ULTRA-FINE ANA PEN NEEDLE) 32 gauge x 5/32" Ndle use to inject insulin    pen needle, diabetic (BD ULTRA-FINE ANA PEN NEEDLE) 32 gauge x 5/32" Ndle Use one needle twice daily    sertraline (ZOLOFT) 25 MG tablet Take 5 tablets by mouth daily for week 1; then 4 tablets daily for week 2; then 3 tablets daily for week 3; then 2 tablets daily for week 4; then 1 tablet daily for week 5, then STOP    sulfamethoxazole-trimethoprim 800-160mg (BACTRIM DS) 800-160 mg Tab Take every Monday, Wednesday, and Friday.  Infectious Disease will decide if this is needed in 3-4 months.    valsartan (DIOVAN) 320 MG tablet TAKE ONE TABLET BY MOUTH ONCE DAILY    venlafaxine (EFFEXOR-XR) 75 MG 24 hr capsule Take 1 capsule (75 mg total) by mouth once daily.    vitamin D 1000 units Tab Take 5 tablets (5,000 Units total) by mouth once daily.     No current facility-administered medications for this visit.      ALLERGIES:   Review of patient's allergies indicates:  No Known Allergies    REVIEW OF SYSTEMS:   General ROS: Positive for fatigue.   Psychological ROS: Confusion " much improved.   Ophthalmic ROS: negative  ENT ROS: negative  Allergy and Immunology ROS: negative  Hematological and Lymphatic ROS: negative  Endocrine ROS: negative  Respiratory ROS: negative  Cardiovascular ROS: negative  Gastrointestinal ROS: negative  Genito-Urinary ROS: negative  Musculoskeletal ROS: chronic back pain  Neurological ROS: see HPI  Dermatological ROS: negative    PHYSICAL EXAM:   Vitals:    07/11/18 1005   BP: 111/76   Pulse: 97   Temp: 98.5 °F (36.9 °C)       General - well developed, well nourished; much more interactive today. A+O x3  Head & Face - no sinus tenderness  Eyes - normal conjunctivae and lids   ENT - normal external auditory canals, no mouth sores  Chest and Lung - normal respiratory effort, clear to auscultation bilaterally   Cardiovascular - RRR with no MGR, normal S1 and S2; no pedal edema  Abdomen -  soft, nontender, no palpable hepatomegaly or splenomegaly  Lymph - no palpable lymphadenopathy  Extremities - unremarkable nails and digits  Heme - no bruising, petechiae, pallor  Skin - no rashes or lesions  Psych - Normal mood and affect. No confusion noted.  MS- wearing brace    ECOG Performance Status: (foot note - ECOG PS provided by Eastern Cooperative Oncology Group) 1 - Symptomatic but completely ambulatory    Karnofsky Performance Score:  80%- Normal Activity with Effort: Some Symptoms of Disease  DATA:   Lab Results   Component Value Date    WBC 10.71 07/11/2018    HGB 11.0 (L) 07/11/2018    HCT 32.7 (L) 07/11/2018    MCV 84 07/11/2018     07/11/2018     Gran # (ANC)   Date Value Ref Range Status   07/11/2018 7.9 (H) 1.8 - 7.7 K/uL Final     Gran%   Date Value Ref Range Status   07/11/2018 73.5 (H) 38.0 - 73.0 % Final     CMP  Sodium   Date Value Ref Range Status   07/11/2018 137 136 - 145 mmol/L Final     Potassium   Date Value Ref Range Status   07/11/2018 3.9 3.5 - 5.1 mmol/L Final     Chloride   Date Value Ref Range Status   07/11/2018 105 95 - 110 mmol/L Final      CO2   Date Value Ref Range Status   07/11/2018 22 (L) 23 - 29 mmol/L Final     Glucose   Date Value Ref Range Status   07/11/2018 85 70 - 110 mg/dL Final     BUN, Bld   Date Value Ref Range Status   07/11/2018 20 6 - 20 mg/dL Final     Creatinine   Date Value Ref Range Status   07/11/2018 1.2 0.5 - 1.4 mg/dL Final     Calcium   Date Value Ref Range Status   07/11/2018 9.7 8.7 - 10.5 mg/dL Final     Total Protein   Date Value Ref Range Status   07/11/2018 7.5 6.0 - 8.4 g/dL Final     Albumin   Date Value Ref Range Status   07/11/2018 4.1 3.5 - 5.2 g/dL Final   10/11/2013 4.3 3.6 - 5.1 g/dL Final     Comment:     @ Test Performed By:  [employer] Estrada M.D., JAYCOB.,   [address] [place] [id number]  [facility] #[id number]     Total Bilirubin   Date Value Ref Range Status   07/11/2018 0.7 0.1 - 1.0 mg/dL Final     Comment:     For infants and newborns, interpretation of results should be based  on gestational age, weight and in agreement with clinical  observations.  Premature Infant recommended reference ranges:  Up to 24 hours.............<8.0 mg/dL  Up to 48 hours............<12.0 mg/dL  3-5 days..................<15.0 mg/dL  6-29 days.................<15.0 mg/dL       Alkaline Phosphatase   Date Value Ref Range Status   07/11/2018 88 55 - 135 U/L Final     AST   Date Value Ref Range Status   07/11/2018 24 10 - 40 U/L Final     ALT   Date Value Ref Range Status   07/11/2018 18 10 - 44 U/L Final     Anion Gap   Date Value Ref Range Status   07/11/2018 10 8 - 16 mmol/L Final     eGFR if    Date Value Ref Range Status   07/11/2018 >60.0 >60 mL/min/1.73 m^2 Final     eGFR if non    Date Value Ref Range Status   07/11/2018 >60.0 >60 mL/min/1.73 m^2 Final     Comment:     Calculation used to obtain the estimated glomerular filtration  rate (eGFR) is the CKD-EPI equation.            ASSESSMENT AND PLAN:   Encounter Diagnosis    Name Primary?    CNS vasculitis Yes     CNS vasculitis  -proceed with dose #12  Cytoxan was increased to 750mg/m2 at dose +10 with mesna support (mesna dose also increased) for CNS vasculitis, protocol per Dr. Love; improved symptoms at increased dose   -per neurology, plan for approximate 1 year of monthly therapy (this will be his final dose today)  -No contraindication to proceed with CTX today    Cytopenias  -mild anemia 2/2 chemotherapy  -WBC and plt normal today, hgb stable   -Transfuse for Hgb<7 and Plt <10k, no indication to transfuse today  -Pt educated to notify us of any bleeding or fevers or S/S infection.     Meera Escoto, DNP, FNP  Hematology/Bone Marrow Transplant    F/U: With Dr. Love in clinic; Appt with Beti Boswell (neuro NP) on 8/14/18    Distress Screening Results: Psychosocial Distress screening score of Distress Score: 2 noted and reviewed. No intervention indicated.

## 2018-07-17 ENCOUNTER — DOCUMENTATION ONLY (OUTPATIENT)
Dept: NEUROLOGY | Facility: CLINIC | Age: 58
End: 2018-07-17

## 2018-07-17 ENCOUNTER — OFFICE VISIT (OUTPATIENT)
Dept: PSYCHIATRY | Facility: CLINIC | Age: 58
End: 2018-07-17
Payer: COMMERCIAL

## 2018-07-17 DIAGNOSIS — Z63.8 FAMILY CONFLICT: ICD-10-CM

## 2018-07-17 DIAGNOSIS — F32.A DEPRESSIVE DISORDER: Primary | ICD-10-CM

## 2018-07-17 DIAGNOSIS — F02.818 MAJOR NEUROCOGNITIVE DISORDER DUE TO ANOTHER MEDICAL CONDITION WITH BEHAVIORAL DISTURBANCE: ICD-10-CM

## 2018-07-17 PROCEDURE — 90847 FAMILY PSYTX W/PT 50 MIN: CPT | Mod: S$GLB,,, | Performed by: SOCIAL WORKER

## 2018-07-17 SDOH — SOCIAL DETERMINANTS OF HEALTH (SDOH): OTHER SPECIFIED PROBLEMS RELATED TO PRIMARY SUPPORT GROUP: Z63.8

## 2018-07-17 NOTE — PROGRESS NOTES
Family Psychotherapy (PhD/LCSW)    7/17/2018    Site: [facility]    Length of service: 50    Therapeutic intervention: 90847-Family therapy with patient; needed because Bessy has neurocognitive disorder and needs support of family to be able to implement any changes/recommendations    Persons present: Bessy, wife Bekah, son Marky    Interval history: Provider started session 15 late 2/2 to running late with previous patient.  Nonetheless, we were able to have a full 50 minute session.  Since last session family members deny any changes in pt's mood.  Pt did begin outpatient physical therapy at [facility] 2x/week.  PCA services, slated to begin 2 weeks ago, have not started and pt's wife is overwhelmed.  She has also not connected with mental health services for herself as PCP did not make a referral. SW will follow up on both issues.  Session focused on continued tension between Bessy and his wife and their son, Marky.  Couple feel that Marky is overstepping boundaries in his efforts to help them financially.  They see his comments about their eating habits and how they spend their money as intrusive and overbearing.  Marky explains that he feels frustrated when he is contributing to the household finances, because the couple cannot afford their bills, and learns that his parents are spending money on dining out.  The couple's expectations for Marky continue to seem unclear and even during session they relayed many mixed messages.  Assisted the family to make a concrete agreement/plan regarding finances.  Marky will pay his phone, car expenses, and [place] property expenses and couple asserts that this will free them to pay their own bills.    Marky also expressed discomfort at being placed in the middle of the couple's disagreements and requested that his mother, especially, not phone him to vent about his father.  SW provided redirection, clarification, and reframing throughout these discussions.     Target  "symptoms: recurrent depression, adjustment     Patient's interpersonal/verbal exchanges: 33204 -Family therapy with patient.  Family discussed issues, with support of SW.  Mixed messages negativity present and required assistance from SW through clarifying statements and reframing.        Progress toward goals: progressing slowly    Diagnosis:  F32.9 Unspecified Depressive Disorder    F02.81 Major neurocognitive disorder due to another medical condition with behavioral disturbance    Z63.8 Family Conflict     Plan: family psychotherapy   Obtain contact information for mental health clinic "near [facility]", per wife's request as she was unable to connect with therapist through her PCPs office.  Couple to increase activities outside/inside home to 2/week  - meeting goal through pt's PT appointments, meals out and visits to family.  SW to follow up on [facility] Waiver for pt to confirm start date.  Pt to continue with outpatient physical therapy .       Return to clinic: 1 week  "

## 2018-07-24 ENCOUNTER — TELEPHONE (OUTPATIENT)
Dept: PSYCHIATRY | Facility: CLINIC | Age: 58
End: 2018-07-24

## 2018-07-24 NOTE — TELEPHONE ENCOUNTER
Called pt's wife to obtain name of PCA agency that will be serving pt.  Pt to be served by [employer], [phone], fax: [phone], email: [email or web address].  Services are still not active.  Phoned the agency and left a message with Etat, who agreed to have the director call within the hour.   SW also investigated MH services for pt's wife as she did not have success connecting through her PCP.  Will discuss referral to:   [facility]  Address: [address] [place] [id number]  Phone Number: [phone]    Hours of Operation:   Monday-- 7:30AM-7:00PM   Tuesday-- 7:30AM-4:30PM   Wednesday-- 7:30AM-7:00PM   Thursday-- 7:30AM-4:30PM   Friday-- 7:30AM-4:30PM    Call or walk into the most convenient [facility] location to complete a brief screening and schedule an individualized Behavioral Health Assessment. Screening call 15-20 mins.  Wait time: Generally, 10 days to see counselor.  Counselor will help schedule you with psychiatrist.  Could be telemed psychiatrist or in-person psychiatrist.   Insurance: Accept BCBS

## 2018-07-26 ENCOUNTER — OFFICE VISIT (OUTPATIENT)
Dept: PSYCHIATRY | Facility: CLINIC | Age: 58
End: 2018-07-26
Payer: COMMERCIAL

## 2018-07-26 DIAGNOSIS — Z63.8 FAMILY CONFLICT: ICD-10-CM

## 2018-07-26 DIAGNOSIS — F02.818 MAJOR NEUROCOGNITIVE DISORDER DUE TO ANOTHER MEDICAL CONDITION WITH BEHAVIORAL DISTURBANCE: ICD-10-CM

## 2018-07-26 DIAGNOSIS — F32.A DEPRESSIVE DISORDER: Primary | ICD-10-CM

## 2018-07-26 PROCEDURE — 90847 FAMILY PSYTX W/PT 50 MIN: CPT | Mod: S$GLB,,, | Performed by: SOCIAL WORKER

## 2018-07-26 SDOH — SOCIAL DETERMINANTS OF HEALTH (SDOH): OTHER SPECIFIED PROBLEMS RELATED TO PRIMARY SUPPORT GROUP: Z63.8

## 2018-07-26 NOTE — PROGRESS NOTES
"Family Psychotherapy (PhD/LCSW)    7/26/2018    Site: [facility]    Length of service: 50    Therapeutic intervention: 90847-Family therapy with patient; needed because Bessy has neurocognitive disorder and needs support of family to be able to implement any changes/recommendations    Persons present: Bessy, wife Bekah    Interval history: Bessy and his wife presented without their son as he had to work.  Bessy presented with casual dress and adequate hygiene, though he's not been as clean-shaven for the past two sessions.  Couple reports that the family did not finalize switching accounts into Marky's name, yet, which would allow some separation between his expenses and couple's expenses.  However, Marky has been checking in less frequently and giving them more space as a couple.  Couple reports that they have been visiting family, almost weekly, as one of the two activities that they agreed to do each week, but they have not been spending time in their hobby rooms, upstairs as agreed.  They recommitted to engaging in two social/hobby activities each week and Bessy will continue in his physical therapy sessions, too.  We also used some time during session to follow up with [facility] regarding the status of pt's waiver services.  We learned that pt is no longer eligible for these services because he lost his Medicaid.  Therefore, family will need to explore private duty options.  Bekah remarked that Bessy (and she) need the most help at the beginning of the day - getting breakfast, completing hygiene tasks, and dressing.  Bekah still has the resource list that I provided.  Pt is almost finished his Zoloft taper.  Continues to experience audiovisual hallucinations of his parents.  Lacks insight into these experiences and states "it's confusing".  Finally, we discussed MH services for Bekah.  SW provided contact information for [facility], near [facility].  Explained steps for scheduling an appointment and Bekah agreed to call prior to our next session.  Of note, this is the first session where Bekah acknowledged that some of Besys's behavioral issues are likely linked to his stroke and may be permanent vs symptomatic of depression.      Target symptoms: recurrent depression, adjustment     Patient's interpersonal/verbal exchanges: 78519 -Family therapy with patient.  Bessy and wife display emotional comforting through holding hands, patting one another, etc.  Bekah occasionally speaks over Bessy or for him and other times shows great patience with his responses.          Progress toward goals: progressing slowly    Diagnosis:  F32.9 Unspecified Depressive Disorder    F02.81 Major neurocognitive disorder due to another medical condition with behavioral disturbance    Z63.8 Family Conflict     Plan: family psychotherapy   Bekah to call [facility] to schedule MH services for herself.   Couple to increase activities outside/inside home to 2/week   Bekah to investigate private duty aide services  Pt to continue with outpatient physical therapy .       Return to clinic: 1 week, will call to schedule

## 2018-08-01 ENCOUNTER — OFFICE VISIT (OUTPATIENT)
Dept: FAMILY MEDICINE | Facility: CLINIC | Age: 58
End: 2018-08-01
Payer: COMMERCIAL

## 2018-08-01 ENCOUNTER — LAB VISIT (OUTPATIENT)
Dept: LAB | Facility: HOSPITAL | Age: 58
End: 2018-08-01
Attending: INTERNAL MEDICINE
Payer: COMMERCIAL

## 2018-08-01 VITALS
WEIGHT: 223.88 LBS | OXYGEN SATURATION: 97 % | HEIGHT: 71 IN | TEMPERATURE: 98 F | DIASTOLIC BLOOD PRESSURE: 78 MMHG | BODY MASS INDEX: 31.34 KG/M2 | HEART RATE: 100 BPM | SYSTOLIC BLOOD PRESSURE: 114 MMHG

## 2018-08-01 DIAGNOSIS — E11.21 CONTROLLED TYPE 2 DIABETES MELLITUS WITH DIABETIC NEPHROPATHY, WITH LONG-TERM CURRENT USE OF INSULIN: ICD-10-CM

## 2018-08-01 DIAGNOSIS — I77.6 CNS VASCULITIS: ICD-10-CM

## 2018-08-01 DIAGNOSIS — E11.65 TYPE 2 DIABETES MELLITUS WITH HYPERGLYCEMIA, WITH LONG-TERM CURRENT USE OF INSULIN: ICD-10-CM

## 2018-08-01 DIAGNOSIS — G47.33 OSA (OBSTRUCTIVE SLEEP APNEA): ICD-10-CM

## 2018-08-01 DIAGNOSIS — Z79.4 CONTROLLED TYPE 2 DIABETES MELLITUS WITH DIABETIC NEPHROPATHY, WITH LONG-TERM CURRENT USE OF INSULIN: ICD-10-CM

## 2018-08-01 DIAGNOSIS — Z00.00 NORMAL PHYSICAL EXAM: Primary | ICD-10-CM

## 2018-08-01 DIAGNOSIS — F32.A DEPRESSION, UNSPECIFIED DEPRESSION TYPE: ICD-10-CM

## 2018-08-01 DIAGNOSIS — I10 ESSENTIAL HYPERTENSION: Chronic | ICD-10-CM

## 2018-08-01 DIAGNOSIS — Z79.4 TYPE 2 DIABETES MELLITUS WITH HYPERGLYCEMIA, WITH LONG-TERM CURRENT USE OF INSULIN: ICD-10-CM

## 2018-08-01 DIAGNOSIS — E78.2 MIXED HYPERLIPIDEMIA: ICD-10-CM

## 2018-08-01 LAB
ESTIMATED AVG GLUCOSE: 131 MG/DL
HBA1C MFR BLD HPLC: 6.2 %

## 2018-08-01 PROCEDURE — 3078F DIAST BP <80 MM HG: CPT | Mod: CPTII,S$GLB,, | Performed by: INTERNAL MEDICINE

## 2018-08-01 PROCEDURE — 3044F HG A1C LEVEL LT 7.0%: CPT | Mod: CPTII,S$GLB,, | Performed by: INTERNAL MEDICINE

## 2018-08-01 PROCEDURE — 3008F BODY MASS INDEX DOCD: CPT | Mod: CPTII,S$GLB,, | Performed by: INTERNAL MEDICINE

## 2018-08-01 PROCEDURE — 99999 PR PBB SHADOW E&M-EST. PATIENT-LVL IV: CPT | Mod: PBBFAC,,, | Performed by: INTERNAL MEDICINE

## 2018-08-01 PROCEDURE — 3074F SYST BP LT 130 MM HG: CPT | Mod: CPTII,S$GLB,, | Performed by: INTERNAL MEDICINE

## 2018-08-01 PROCEDURE — 83036 HEMOGLOBIN GLYCOSYLATED A1C: CPT

## 2018-08-01 PROCEDURE — 99214 OFFICE O/P EST MOD 30 MIN: CPT | Mod: S$GLB,,, | Performed by: INTERNAL MEDICINE

## 2018-08-01 PROCEDURE — 36415 COLL VENOUS BLD VENIPUNCTURE: CPT | Mod: PO

## 2018-08-01 RX ORDER — ATORVASTATIN CALCIUM 40 MG/1
40 TABLET, FILM COATED ORAL DAILY
Qty: 90 TABLET | Refills: 3 | Status: SHIPPED | OUTPATIENT
Start: 2018-08-01 | End: 2019-10-05 | Stop reason: SDUPTHER

## 2018-08-01 RX ORDER — INSULIN ASPART 100 [IU]/ML
20 INJECTION, SOLUTION INTRAVENOUS; SUBCUTANEOUS
Qty: 18 ML | Refills: 11 | Status: CANCELLED | OUTPATIENT
Start: 2018-08-01 | End: 2019-08-01

## 2018-08-01 RX ORDER — INSULIN GLARGINE 100 [IU]/ML
40 INJECTION, SOLUTION SUBCUTANEOUS NIGHTLY
Qty: 2 BOX | Refills: 2 | Status: CANCELLED | OUTPATIENT
Start: 2018-08-01 | End: 2019-08-01

## 2018-08-01 RX ORDER — VENLAFAXINE HYDROCHLORIDE 75 MG/1
150 CAPSULE, EXTENDED RELEASE ORAL DAILY
Qty: 60 CAPSULE | Refills: 1 | Status: SHIPPED | OUTPATIENT
Start: 2018-08-01 | End: 2018-09-19 | Stop reason: SDUPTHER

## 2018-08-01 RX ORDER — INSULIN GLARGINE 100 [IU]/ML
40 INJECTION, SOLUTION SUBCUTANEOUS NIGHTLY
Qty: 2 BOX | Refills: 2 | Status: SHIPPED | OUTPATIENT
Start: 2018-08-01 | End: 2018-08-15 | Stop reason: SDUPTHER

## 2018-08-01 RX ORDER — IRBESARTAN 300 MG/1
300 TABLET ORAL NIGHTLY
Qty: 90 TABLET | Refills: 3 | Status: SHIPPED | OUTPATIENT
Start: 2018-08-01 | End: 2019-10-29 | Stop reason: SDUPTHER

## 2018-08-01 RX ORDER — ATENOLOL 50 MG/1
50 TABLET ORAL DAILY
Qty: 90 TABLET | Refills: 3 | Status: SHIPPED | OUTPATIENT
Start: 2018-08-01 | End: 2019-11-21 | Stop reason: SDUPTHER

## 2018-08-01 RX ORDER — INSULIN ASPART 100 [IU]/ML
20 INJECTION, SOLUTION INTRAVENOUS; SUBCUTANEOUS
Qty: 18 ML | Refills: 11 | Status: SHIPPED | OUTPATIENT
Start: 2018-08-01 | End: 2018-08-15 | Stop reason: SDUPTHER

## 2018-08-01 RX ORDER — DILTIAZEM HYDROCHLORIDE 240 MG/1
240 CAPSULE, EXTENDED RELEASE ORAL DAILY
Qty: 90 CAPSULE | Refills: 3 | Status: SHIPPED | OUTPATIENT
Start: 2018-08-01 | End: 2019-10-03 | Stop reason: SDUPTHER

## 2018-08-01 RX ORDER — VALSARTAN 320 MG/1
320 TABLET ORAL DAILY
Qty: 90 TABLET | Refills: 3 | Status: CANCELLED | OUTPATIENT
Start: 2018-08-01

## 2018-08-01 RX ORDER — METFORMIN HYDROCHLORIDE 500 MG/1
1000 TABLET, EXTENDED RELEASE ORAL 2 TIMES DAILY WITH MEALS
Qty: 360 TABLET | Refills: 3 | Status: SHIPPED | OUTPATIENT
Start: 2018-08-01 | End: 2020-03-12

## 2018-08-03 ENCOUNTER — TELEPHONE (OUTPATIENT)
Dept: FAMILY MEDICINE | Facility: CLINIC | Age: 58
End: 2018-08-03

## 2018-08-14 ENCOUNTER — OFFICE VISIT (OUTPATIENT)
Dept: NEUROLOGY | Facility: CLINIC | Age: 58
End: 2018-08-14
Payer: COMMERCIAL

## 2018-08-14 VITALS
SYSTOLIC BLOOD PRESSURE: 138 MMHG | DIASTOLIC BLOOD PRESSURE: 93 MMHG | HEART RATE: 98 BPM | BODY MASS INDEX: 31.23 KG/M2 | WEIGHT: 220.81 LBS

## 2018-08-14 DIAGNOSIS — R26.9 GAIT DISTURBANCE: ICD-10-CM

## 2018-08-14 DIAGNOSIS — Z29.89 PROPHYLACTIC IMMUNOTHERAPY: ICD-10-CM

## 2018-08-14 DIAGNOSIS — Z79.899 HIGH RISK MEDICATION USE: ICD-10-CM

## 2018-08-14 DIAGNOSIS — Z71.89 COUNSELING REGARDING GOALS OF CARE: ICD-10-CM

## 2018-08-14 DIAGNOSIS — D84.9 IMMUNOSUPPRESSION: ICD-10-CM

## 2018-08-14 DIAGNOSIS — E55.9 VITAMIN D DEFICIENCY: ICD-10-CM

## 2018-08-14 DIAGNOSIS — R90.89 ABNORMAL BRAIN MRI: ICD-10-CM

## 2018-08-14 DIAGNOSIS — I77.6 CNS VASCULITIS: Primary | ICD-10-CM

## 2018-08-14 PROCEDURE — 99999 PR PBB SHADOW E&M-EST. PATIENT-LVL III: CPT | Mod: PBBFAC,,, | Performed by: CLINICAL NURSE SPECIALIST

## 2018-08-14 PROCEDURE — 99215 OFFICE O/P EST HI 40 MIN: CPT | Mod: S$GLB,,, | Performed by: CLINICAL NURSE SPECIALIST

## 2018-08-14 PROCEDURE — 3075F SYST BP GE 130 - 139MM HG: CPT | Mod: CPTII,S$GLB,, | Performed by: CLINICAL NURSE SPECIALIST

## 2018-08-14 PROCEDURE — 3080F DIAST BP >= 90 MM HG: CPT | Mod: CPTII,S$GLB,, | Performed by: CLINICAL NURSE SPECIALIST

## 2018-08-14 PROCEDURE — 3008F BODY MASS INDEX DOCD: CPT | Mod: CPTII,S$GLB,, | Performed by: CLINICAL NURSE SPECIALIST

## 2018-08-14 NOTE — PROGRESS NOTES
Subjective:       Patient ID: Bessy Nunez is a 58 y.o. male who presents today for a routine clinic visit for CNS vasculitis.  The history has been provided by the patient. He was last seen in 2018.  He is accompanied by his wife and son.     · Mr. Nunez has completed 12 treatments of Cytoxan under Dr. Goldstein in hem-onc. His wife reports a sustained effect from the medication for about a month. After 4 weeks or so, he starts to stumble and has more confusion. He also has some hallucinations during which he sees his  mother and talks to her.   · His Effexor was increased to 150mg last week by PCP, and his wife reports that this has helped with his energy level a bit.   · He is sleeping well at night.   · Bowel and bladder are normal.   · He is going to [facility] twice a week for PT.   · His walking has been stable. He denies falls, but stumbles often. His son states that the patient is not using his walker.   · Since Cytoxan has been complete, his memory and cognition has been worse (according to his family). He will forget if his wife tells him to go brush his teeth, etc. His son also comments that he will not answer sometimes when they are on the phone if he is watching TV (impaired concentration).   · He is doing counseling with Sharda.   · He denies any pain currently, but gets headaches from time to time.   · His wife will be seeing a psychiatrist next month to help her cope with her 's illness.   · He has some issues doing buttons. He has no trouble holding a fork or spoon. His appetite has been good.   · He is taking Vitamin D 5000 units daily.     SOCIAL HISTORY  Social History     Tobacco Use    Smoking status: Never Smoker    Smokeless tobacco: Never Used   Substance Use Topics    Alcohol use: No     Alcohol/week: 0.0 oz    Drug use: No     Living arrangements - the patient lives with his wife.   Employment: He receives disability.           Objective:        1. 25 foot timed  walk: 7.65 seconds today WITHOUT assistance; was 10.3 seconds at last visit WITHOUT assistance    Neurologic Exam      Physical Exam       He continues to have flat affect, but participates fairly well in conversation.   Extraocular movements are intact.   Facial movements are normal. He has normal strength in upper and lower extremities. Rapid sequential movements are normal in the upper extremities. Vibratory sense is intact.  Reflexes are 2+ and symmetric throughout.      Romberg is positive, but only mild sway.   Finger to nose coordination is normal.   He is able to follow 3 step commands and does so without hesitation, which is an improvement from previous visits.      He knows the day of the week, month, date, and year. He knows the . He knows that he is at [facility] at the main [place].     Imaging:     No new imaging to review today.     Labs:     Lab Results   Component Value Date    NYDERQZO12MF 11 (L) 05/17/2017     Lab Results   Component Value Date    WBC 10.71 07/11/2018    RBC 3.89 (L) 07/11/2018    HGB 11.0 (L) 07/11/2018    HCT 32.7 (L) 07/11/2018    MCV 84 07/11/2018    MCH 28.3 07/11/2018    MCHC 33.6 07/11/2018    RDW 15.7 (H) 07/11/2018     07/11/2018    MPV 9.3 07/11/2018    GRAN 7.9 (H) 07/11/2018    GRAN 73.5 (H) 07/11/2018    LYMPH 1.6 07/11/2018    LYMPH 14.8 (L) 07/11/2018    MONO 1.0 07/11/2018    MONO 9.0 07/11/2018    EOS 0.2 07/11/2018    BASO 0.06 07/11/2018    EOSINOPHIL 1.6 07/11/2018    BASOPHIL 0.6 07/11/2018     Sodium   Date Value Ref Range Status   07/11/2018 137 136 - 145 mmol/L Final     Potassium   Date Value Ref Range Status   07/11/2018 3.9 3.5 - 5.1 mmol/L Final     Chloride   Date Value Ref Range Status   07/11/2018 105 95 - 110 mmol/L Final     CO2   Date Value Ref Range Status   07/11/2018 22 (L) 23 - 29 mmol/L Final     Glucose   Date Value Ref Range Status   07/11/2018 85 70 - 110 mg/dL Final     BUN, Bld   Date Value Ref  Range Status   07/11/2018 20 6 - 20 mg/dL Final     Creatinine   Date Value Ref Range Status   07/11/2018 1.2 0.5 - 1.4 mg/dL Final     Calcium   Date Value Ref Range Status   07/11/2018 9.7 8.7 - 10.5 mg/dL Final     Total Protein   Date Value Ref Range Status   07/11/2018 7.5 6.0 - 8.4 g/dL Final     Albumin   Date Value Ref Range Status   07/11/2018 4.1 3.5 - 5.2 g/dL Final   10/11/2013 4.3 3.6 - 5.1 g/dL Final     Comment:     @ Test Performed By:  [facility] AMELIA Almazan M.D.,   [address] [place] [id number]  [facility] #[id number]     Total Bilirubin   Date Value Ref Range Status   07/11/2018 0.7 0.1 - 1.0 mg/dL Final     Comment:     For infants and newborns, interpretation of results should be based  on gestational age, weight and in agreement with clinical  observations.  Premature Infant recommended reference ranges:  Up to 24 hours.............<8.0 mg/dL  Up to 48 hours............<12.0 mg/dL  3-5 days..................<15.0 mg/dL  6-29 days.................<15.0 mg/dL       Alkaline Phosphatase   Date Value Ref Range Status   07/11/2018 88 55 - 135 U/L Final     AST   Date Value Ref Range Status   07/11/2018 24 10 - 40 U/L Final     ALT   Date Value Ref Range Status   07/11/2018 18 10 - 44 U/L Final     Anion Gap   Date Value Ref Range Status   07/11/2018 10 8 - 16 mmol/L Final     eGFR if    Date Value Ref Range Status   07/11/2018 >60.0 >60 mL/min/1.73 m^2 Final     eGFR if non    Date Value Ref Range Status   07/11/2018 >60.0 >60 mL/min/1.73 m^2 Final     Comment:     Calculation used to obtain the estimated glomerular filtration  rate (eGFR) is the CKD-EPI equation.        Lab Results   Component Value Date    COLORU Yellow 04/02/2018    APPEARANCEUA Clear 04/02/2018    PHUR 5.0 04/02/2018    SPECGRAV 1.020 04/02/2018    PROTEINUA Trace (A) 04/02/2018    GLUCUA Negative 04/02/2018    KETONESU Negative  04/02/2018    BILIRUBINUA Negative 04/02/2018    OCCULTUA Trace (A) 04/02/2018    NITRITE Negative 04/02/2018    UROBILINOGEN Negative 04/02/2018    LEUKOCYTESUR Negative 04/02/2018     Assessment:      1. CNS Vasculitis  2. Cognitive dysfunction     Plan:        1. Since he cannot take Cytoxan as a long-term medication, we have decided to switch him to Imuran as immune    therapy. Will get TPMT blood test (genotyping to show how well he will metabolize drug) to determine if Imuran is a good fit for him. Once this result is received and it is deemed appropriate for him to take Imuran, we can proceed with prescription for 50mg daily for 5 days, then increase to 100mg daily for 5 days, then 150mg daily thereafter.     2. Referral entered for infectious disease for vaccine management in light of immunosuppression. Labs ordered for Strongyloides, RPR, TB, Immunoglobulins, HIV, Hep A/B/C, CD8, CBC.     3. Continue Vitamin D. Will check Vitamin D level today.     4. Continue counseling with Sharda Gonzalez LCSW    5. Continue physical therapy at [facility].     6. D/C Bactrim as per Dr. Love.     7. MRI after he has been on Imuran for a few months (will order at next visit).     Over 50% of this 40 minute visit was spent discussing the patien's diagnosis of CNS vasculitis, current symptoms, and plans for Cytoxan. The patient and his wife agree with the plan of care. I will see him back in 4 weeks.   Dr. Love was present for the last 10 minutes of the visit and participated in the medical decision making.     Beti Boswell, [facility] Hospital[facility]-BC, MSCN      There are no diagnoses linked to this encounter.

## 2018-08-15 ENCOUNTER — LAB VISIT (OUTPATIENT)
Dept: LAB | Facility: HOSPITAL | Age: 58
End: 2018-08-15
Attending: CLINICAL NURSE SPECIALIST
Payer: COMMERCIAL

## 2018-08-15 DIAGNOSIS — E11.65 TYPE 2 DIABETES MELLITUS WITH HYPERGLYCEMIA, WITH LONG-TERM CURRENT USE OF INSULIN: ICD-10-CM

## 2018-08-15 DIAGNOSIS — E11.21 CONTROLLED TYPE 2 DIABETES MELLITUS WITH DIABETIC NEPHROPATHY, WITH LONG-TERM CURRENT USE OF INSULIN: ICD-10-CM

## 2018-08-15 DIAGNOSIS — Z79.4 TYPE 2 DIABETES MELLITUS WITH HYPERGLYCEMIA, WITH LONG-TERM CURRENT USE OF INSULIN: ICD-10-CM

## 2018-08-15 DIAGNOSIS — I77.6 CNS VASCULITIS: ICD-10-CM

## 2018-08-15 DIAGNOSIS — E55.9 VITAMIN D DEFICIENCY: ICD-10-CM

## 2018-08-15 DIAGNOSIS — Z79.4 CONTROLLED TYPE 2 DIABETES MELLITUS WITH DIABETIC NEPHROPATHY, WITH LONG-TERM CURRENT USE OF INSULIN: ICD-10-CM

## 2018-08-15 DIAGNOSIS — D84.9 IMMUNOSUPPRESSION: ICD-10-CM

## 2018-08-15 LAB
25(OH)D3+25(OH)D2 SERPL-MCNC: 36 NG/ML
ALBUMIN SERPL BCP-MCNC: 4.2 G/DL
ALP SERPL-CCNC: 71 U/L
ALT SERPL W/O P-5'-P-CCNC: 17 U/L
ANION GAP SERPL CALC-SCNC: 13 MMOL/L
AST SERPL-CCNC: 17 U/L
BASOPHILS # BLD AUTO: 0.03 K/UL
BASOPHILS NFR BLD: 0.4 %
BILIRUB SERPL-MCNC: 0.5 MG/DL
BUN SERPL-MCNC: 18 MG/DL
CALCIUM SERPL-MCNC: 10.1 MG/DL
CHLORIDE SERPL-SCNC: 105 MMOL/L
CO2 SERPL-SCNC: 23 MMOL/L
CREAT SERPL-MCNC: 1.1 MG/DL
DIFFERENTIAL METHOD: ABNORMAL
EOSINOPHIL # BLD AUTO: 0.1 K/UL
EOSINOPHIL NFR BLD: 1.9 %
ERYTHROCYTE [DISTWIDTH] IN BLOOD BY AUTOMATED COUNT: 15.3 %
EST. GFR  (AFRICAN AMERICAN): >60 ML/MIN/1.73 M^2
EST. GFR  (NON AFRICAN AMERICAN): >60 ML/MIN/1.73 M^2
GLUCOSE SERPL-MCNC: 120 MG/DL
HCT VFR BLD AUTO: 34 %
HGB BLD-MCNC: 11.6 G/DL
IGA SERPL-MCNC: 178 MG/DL
IGG SERPL-MCNC: 818 MG/DL
IGM SERPL-MCNC: 37 MG/DL
LYMPHOCYTES # BLD AUTO: 1.4 K/UL
LYMPHOCYTES NFR BLD: 20.3 %
MCH RBC QN AUTO: 28.6 PG
MCHC RBC AUTO-ENTMCNC: 34.1 G/DL
MCV RBC AUTO: 84 FL
MONOCYTES # BLD AUTO: 0.6 K/UL
MONOCYTES NFR BLD: 9.2 %
NEUTROPHILS # BLD AUTO: 4.7 K/UL
NEUTROPHILS NFR BLD: 68.2 %
PLATELET # BLD AUTO: 227 K/UL
PMV BLD AUTO: 9.1 FL
POTASSIUM SERPL-SCNC: 3.8 MMOL/L
PROT SERPL-MCNC: 7.5 G/DL
RBC # BLD AUTO: 4.05 M/UL
RPR SER QL: NORMAL
SODIUM SERPL-SCNC: 141 MMOL/L
WBC # BLD AUTO: 6.93 K/UL

## 2018-08-15 PROCEDURE — 86682 HELMINTH ANTIBODY: CPT

## 2018-08-15 PROCEDURE — 86803 HEPATITIS C AB TEST: CPT

## 2018-08-15 PROCEDURE — 86703 HIV-1/HIV-2 1 RESULT ANTBDY: CPT

## 2018-08-15 PROCEDURE — 36415 COLL VENOUS BLD VENIPUNCTURE: CPT

## 2018-08-15 PROCEDURE — 86706 HEP B SURFACE ANTIBODY: CPT

## 2018-08-15 PROCEDURE — 86790 VIRUS ANTIBODY NOS: CPT

## 2018-08-15 PROCEDURE — 86359 T CELLS TOTAL COUNT: CPT

## 2018-08-15 PROCEDURE — 87340 HEPATITIS B SURFACE AG IA: CPT

## 2018-08-15 PROCEDURE — 86360 T CELL ABSOLUTE COUNT/RATIO: CPT

## 2018-08-15 PROCEDURE — 86592 SYPHILIS TEST NON-TREP QUAL: CPT

## 2018-08-15 PROCEDURE — 80053 COMPREHEN METABOLIC PANEL: CPT

## 2018-08-15 PROCEDURE — 85025 COMPLETE CBC W/AUTO DIFF WBC: CPT

## 2018-08-15 PROCEDURE — 86704 HEP B CORE ANTIBODY TOTAL: CPT

## 2018-08-15 PROCEDURE — 86787 VARICELLA-ZOSTER ANTIBODY: CPT

## 2018-08-15 PROCEDURE — 86480 TB TEST CELL IMMUN MEASURE: CPT

## 2018-08-15 PROCEDURE — 82306 VITAMIN D 25 HYDROXY: CPT

## 2018-08-15 PROCEDURE — 82784 ASSAY IGA/IGD/IGG/IGM EACH: CPT | Mod: 59

## 2018-08-15 PROCEDURE — 81479 UNLISTED MOLECULAR PATHOLOGY: CPT

## 2018-08-15 RX ORDER — INSULIN GLARGINE 100 [IU]/ML
40 INJECTION, SOLUTION SUBCUTANEOUS 2 TIMES DAILY
Qty: 2 BOX | Refills: 11 | Status: SHIPPED | OUTPATIENT
Start: 2018-08-15 | End: 2018-12-14

## 2018-08-15 RX ORDER — INSULIN ASPART 100 [IU]/ML
20 INJECTION, SOLUTION INTRAVENOUS; SUBCUTANEOUS
Qty: 18 ML | Refills: 11 | Status: ON HOLD | OUTPATIENT
Start: 2018-08-15 | End: 2019-12-17 | Stop reason: HOSPADM

## 2018-08-15 RX ORDER — BLOOD-GLUCOSE CONTROL, NORMAL
EACH MISCELLANEOUS
Qty: 100 EACH | Refills: 11 | Status: SHIPPED | OUTPATIENT
Start: 2018-08-15

## 2018-08-15 RX ORDER — PEN NEEDLE, DIABETIC 30 GX3/16"
NEEDLE, DISPOSABLE MISCELLANEOUS
Qty: 100 EACH | Refills: 0 | Status: SHIPPED | OUTPATIENT
Start: 2018-08-15 | End: 2018-08-20 | Stop reason: SDUPTHER

## 2018-08-16 LAB
ABSOLUTE CD3: 1271 CELLS/UL (ref 700–2100)
ABSOLUTE CD8: 327 CELLS/UL (ref 200–900)
CD3%: 87.9 % (ref 55–83)
CD3+CD4+ CELLS # BLD: 892 CELLS/UL (ref 300–1400)
CD3+CD4+ CELLS NFR BLD: 61.7 % (ref 28–57)
CD4/CD8 RATIO: 2.73 (ref 0.9–3.6)
CD8 % SUPPRESSOR T CELL: 22.6 % (ref 10–39)
HBV CORE AB SERPL QL IA: NEGATIVE
HBV SURFACE AB SER-ACNC: NEGATIVE M[IU]/ML
HBV SURFACE AG SERPL QL IA: NEGATIVE
HCV AB SERPL QL IA: NEGATIVE
HEPATITIS A ANTIBODY, IGG: NEGATIVE
HIV 1+2 AB+HIV1 P24 AG SERPL QL IA: NEGATIVE
MITOGEN IGNF BCKGRD COR BLD-ACNC: 0.03 IU/ML
MITOGEN NIL: >10 IU/ML
TB GOLD PLUS: NEGATIVE
TB1 - NIL: 0.06 IU/ML
TB2 - NIL: 0.01 IU/ML
VARICELLA INTERPRETATION: POSITIVE
VARICELLA ZOSTER IGG: 3.8 ISR

## 2018-08-17 ENCOUNTER — OFFICE VISIT (OUTPATIENT)
Dept: PODIATRY | Facility: CLINIC | Age: 58
End: 2018-08-17
Payer: COMMERCIAL

## 2018-08-17 VITALS
BODY MASS INDEX: 30.9 KG/M2 | HEIGHT: 71 IN | SYSTOLIC BLOOD PRESSURE: 140 MMHG | WEIGHT: 220.69 LBS | DIASTOLIC BLOOD PRESSURE: 90 MMHG

## 2018-08-17 DIAGNOSIS — E11.42 TYPE 2 DIABETES MELLITUS WITH DIABETIC POLYNEUROPATHY, WITH LONG-TERM CURRENT USE OF INSULIN: Primary | ICD-10-CM

## 2018-08-17 DIAGNOSIS — M20.42 HAMMERTOES OF BOTH FEET: ICD-10-CM

## 2018-08-17 DIAGNOSIS — Z79.4 TYPE 2 DIABETES MELLITUS WITH DIABETIC POLYNEUROPATHY, WITH LONG-TERM CURRENT USE OF INSULIN: Primary | ICD-10-CM

## 2018-08-17 DIAGNOSIS — M20.41 HAMMERTOES OF BOTH FEET: ICD-10-CM

## 2018-08-17 LAB
NUDT15 GENOTYPE: NORMAL
NUDT15 PHENOTYPE: NORMAL
STRONGYLOIDES ANTIBODY IGG: NEGATIVE
TPMT ADDITIONAL INFORMATION: NORMAL
TPMT DISCLAIMER: NORMAL
TPMT GENOTYPE RESULT: NORMAL
TPMT INTERPRETATION: NORMAL
TPMT METHOD: NORMAL
TPMT PHENOTYPE: NORMAL
TPMT REVIEWED BY: NORMAL

## 2018-08-17 PROCEDURE — 3008F BODY MASS INDEX DOCD: CPT | Mod: CPTII,S$GLB,, | Performed by: PODIATRIST

## 2018-08-17 PROCEDURE — 99999 PR PBB SHADOW E&M-EST. PATIENT-LVL IV: CPT | Mod: PBBFAC,,, | Performed by: PODIATRIST

## 2018-08-17 PROCEDURE — 3080F DIAST BP >= 90 MM HG: CPT | Mod: CPTII,S$GLB,, | Performed by: PODIATRIST

## 2018-08-17 PROCEDURE — 3044F HG A1C LEVEL LT 7.0%: CPT | Mod: CPTII,S$GLB,, | Performed by: PODIATRIST

## 2018-08-17 PROCEDURE — 3077F SYST BP >= 140 MM HG: CPT | Mod: CPTII,S$GLB,, | Performed by: PODIATRIST

## 2018-08-17 PROCEDURE — 99213 OFFICE O/P EST LOW 20 MIN: CPT | Mod: S$GLB,,, | Performed by: PODIATRIST

## 2018-08-17 NOTE — PROGRESS NOTES
Subjective:      Patient ID: Bessy Nunez is a 58 y.o. male.    Chief Complaint: Diabetes Mellitus (pcp Dair 8-1-18); Diabetic Foot Exam; and Nail Care    Bessy is a 58 y.o. male who presents to the clinic upon referral from Dr. Nova  for evaluation and treatment of diabetic feet. 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 Patient relates no major problem with feet. Only complaints today consist of occasional paresthesia. Dirt noted to B/L plantar foot.     PCP: Edgar Nova MD    Date Last Seen by PCP: 8/1/18    Current shoe gear: Tennis shoes    Hemoglobin A1C   Date Value Ref Range Status   08/01/2018 6.2 (H) 4.0 - 5.6 % Final     Comment:     ADA Screening Guidelines:  5.7-6.4%  Consistent with prediabetes  >or=6.5%  Consistent with diabetes  High levels of fetal hemoglobin interfere with the HbA1C  assay. Heterozygous hemoglobin variants (HbS, HgC, etc)do  not significantly interfere with this assay.   However, presence of multiple variants may affect accuracy.     04/01/2018 5.7 (H) 4.0 - 5.6 % Final     Comment:     According to ADA guidelines, hemoglobin A1c <7.0% represents  optimal control in non-pregnant diabetic patients. Different  metrics may apply to specific patient populations.   Standards of Medical Care in Diabetes-2016.  For the purpose of screening for the presence of diabetes:  <5.7%     Consistent with the absence of  diabetes  5.7-6.4%  Consistent with increasing risk for diabetes   (prediabetes)  >or=6.5%  Consistent with diabetes  Currently, no consensus exists for use of hemoglobin A1c  for diagnosis of diabetes for children.  This Hemoglobin A1c assay has significant interference with fetal   hemoglobin   (HbF). The results are invalid for patients with abnormal amounts of   HbF,   including those with known Hereditary Persistence   of Fetal Hemoglobin. Heterozygous hemoglobin variants (HbAS, HbAC,   HbAD, HbAE, HbA2) do not significantly interfere with this assay;   however, presence of multiple variants in a sample may impact the %   interference.     02/09/2018 7.1 (H) 4.0 - 5.6 % Final     Comment:     According to ADA guidelines, hemoglobin A1c <7.0% represents  optimal control in non-pregnant diabetic patients. Different  metrics may apply to specific patient populations.   Standards of Medical Care in Diabetes-2016.  For the purpose of screening for the presence of diabetes:  <5.7%     Consistent with the absence of diabetes  5.7-6.4%  Consistent with increasing risk for diabetes   (prediabetes)  >or=6.5%  Consistent with diabetes  Currently, no consensus exists for use of hemoglobin A1c  for diagnosis of diabetes for children.  This Hemoglobin A1c assay has significant interference with fetal   hemoglobin   (HbF). The results are invalid for patients with abnormal amounts of   HbF,   including those with known Hereditary Persistence   of Fetal Hemoglobin. Heterozygous hemoglobin variants (HbAS, HbAC,   HbAD, HbAE, HbA2) do not significantly interfere with this assay;   however, presence of multiple variants in a sample may impact the %   interference.             Review of Systems   Constitution: Negative for chills, diaphoresis, fever and weakness.   Cardiovascular: Negative for claudication, cyanosis, leg swelling and syncope.   Respiratory: Negative for cough and shortness of breath.    Skin: Positive for color  change and dry skin. Negative for nail changes and suspicious lesions.   Musculoskeletal: Negative for falls, joint pain, muscle cramps and muscle weakness.   Gastrointestinal: Negative for diarrhea, nausea and vomiting.   Neurological: Positive for numbness and paresthesias. Negative for disturbances in coordination, sensory change and tremors.   Psychiatric/Behavioral: Negative for altered mental status.           Objective:      Physical Exam   Constitutional: He appears well-developed. He is cooperative.   Oriented to time, place, and person.   Cardiovascular:   DP and PT pulses are palpable bilaterally. 3 sec capillary refill time and toes and feet are warm to touch proximally .  There is  hair growth on the feet and toes b/l. There is no edema b/l. No spider veins or varicosities present b/l.      Musculoskeletal:   Equinus noted b/l ankles with < 10 deg DF noted. MMT 5/5 in DF/PF/Inv/Ev resistance with no reproduction of pain in any direction. Passive range of motion of ankle and pedal joints is painless b/l.     Feet:   Right Foot:   Skin Integrity: Negative for callus or dry skin.   Left Foot:   Skin Integrity: Negative for callus or dry skin.   Lymphadenopathy:   Negative lymphadenopathy bilateral popliteal fossa and tarsal tunnel.   Neurological: He is alert.   Light touch, proprioception, and sharp/dull sensation are all intact bilaterally. Protective threshold with the Smith-Wienstein monofilament is intact bilaterally.    Subjective paresthesias with no clearly identifiable source or trigger.      Skin:   No open lesions, lacerations or wounds noted.Interdigital spaces clean, dry and intact b/l. No erythema noted to b/l foot.  Nails neatly trimmed. Dirt noted to B/L plantar foot.      Psychiatric: He has a normal mood and affect.             Assessment:       Encounter Diagnoses   Name Primary?    Type 2 diabetes mellitus with diabetic polyneuropathy, with long-term current use of insulin Yes     Hammertoes of both feet          Plan:       Bessy was seen today for diabetes mellitus, diabetic foot exam and nail care.    Diagnoses and all orders for this visit:    Type 2 diabetes mellitus with diabetic polyneuropathy, with long-term current use of insulin    Hammertoes of both feet      I counseled the patient on his conditions, their implications and medical management.      - Shoe inspection. Diabetic Foot Education. Patient reminded of the importance of good nutrition and blood sugar control to help prevent podiatric complications of diabetes. Patient instructed on proper foot hygeine. We discussed wearing proper shoe gear, daily foot inspections, never walking without protective shoe gear, caution putting sharp instruments to feet     - Discussed DM foot care:  Wear comfortable, proper fitting shoes. Wash feet daily. Dry well. After drying, apply moisturizer to feet (no lotion to webspaces). Inspect feet daily for skin breaks, blisters, swelling, or redness. Wear cotton socks (preferably white)  Change socks every day. Do NOT walk barefoot. Do NOT use heating pads or warm/hot water soaks     F/u one year.     Sherrie Lu DPM

## 2018-08-17 NOTE — LETTER
August 17, 2018      Edgar Nova MD  [address]  [place] [address]           [facility]  [address]  [place] [address]  Phone: [phone]          Patient: Bessy Nunez   MR Number: [id number]   YOB: 1960   Date of Visit: 8/17/2018       Dear Dr. Edgar Nova:    Thank you for referring Bessy Nunez to me for evaluation. Attached you will find relevant portions of my assessment and plan of care.    If you have questions, please do not hesitate to call me. I look forward to following Bessy Nunez along with you.    Sincerely,    Sherrie Lu, NANCY    Enclosure  CC:  No Recipients    If you would like to receive this communication electronically, please contact [email or web address] or [phone] to request more information on [facility] [facility] access.    For providers and/or their staff who would like to refer a patient to [facility], please contact us through our one-stop-shop provider referral line, [facility], at [phone].    If you feel you have received this communication in error or would no longer like to receive these types of communications, please e-mail [email or web address]

## 2018-08-20 ENCOUNTER — TELEPHONE (OUTPATIENT)
Dept: FAMILY MEDICINE | Facility: CLINIC | Age: 58
End: 2018-08-20

## 2018-08-20 DIAGNOSIS — Z79.4 CONTROLLED TYPE 2 DIABETES MELLITUS WITH DIABETIC NEPHROPATHY, WITH LONG-TERM CURRENT USE OF INSULIN: Primary | ICD-10-CM

## 2018-08-20 DIAGNOSIS — E11.21 CONTROLLED TYPE 2 DIABETES MELLITUS WITH DIABETIC NEPHROPATHY, WITH LONG-TERM CURRENT USE OF INSULIN: Primary | ICD-10-CM

## 2018-08-20 RX ORDER — PEN NEEDLE, DIABETIC 30 GX3/16"
NEEDLE, DISPOSABLE MISCELLANEOUS
Qty: 100 EACH | Refills: 0 | Status: SHIPPED | OUTPATIENT
Start: 2018-08-20 | End: 2018-08-20 | Stop reason: SDUPTHER

## 2018-08-20 RX ORDER — PEN NEEDLE, DIABETIC 30 GX3/16"
NEEDLE, DISPOSABLE MISCELLANEOUS
Qty: 400 EACH | Refills: 5 | Status: SHIPPED | OUTPATIENT
Start: 2018-08-20 | End: 2018-11-05 | Stop reason: SDUPTHER

## 2018-08-20 NOTE — TELEPHONE ENCOUNTER
"----- Message from Nicki Grissom sent at 8/17/2018  3:38 PM CDT -----  Contact: Wife-   Med refill:    pen needle, diabetic (BD ULTRA-FINE ANA PEN NEEDLE) 32 gauge x 5/32" Ndle      Wife is calling for the insulin needles for the pen. She needs you all to write script in a different way. Its currently written to say he takes two shot daily but he really takes four daily. Please call at [phone]      [facility] [address]   "

## 2018-08-20 NOTE — TELEPHONE ENCOUNTER
----- Message from Patricia Chandra sent at 8/18/2018  1:27 PM CDT -----  Contact: Mrs Nunez  Pt would like to be called back regarding Rx for pen needles  Pt can be reached at [phone]

## 2018-08-21 ENCOUNTER — OFFICE VISIT (OUTPATIENT)
Dept: OPTOMETRY | Facility: CLINIC | Age: 58
End: 2018-08-21
Payer: COMMERCIAL

## 2018-08-21 ENCOUNTER — TELEPHONE (OUTPATIENT)
Dept: NEUROLOGY | Facility: CLINIC | Age: 58
End: 2018-08-21

## 2018-08-21 DIAGNOSIS — I77.6 CNS VASCULITIS: Primary | ICD-10-CM

## 2018-08-21 DIAGNOSIS — E11.3413 SEVERE NONPROLIFERATIVE DIABETIC RETINOPATHY OF BOTH EYES WITH MACULAR EDEMA ASSOCIATED WITH TYPE 2 DIABETES MELLITUS: Primary | ICD-10-CM

## 2018-08-21 DIAGNOSIS — H25.13 NUCLEAR SCLEROSIS OF BOTH EYES: ICD-10-CM

## 2018-08-21 DIAGNOSIS — H52.7 REFRACTIVE ERROR: ICD-10-CM

## 2018-08-21 PROCEDURE — 92134 CPTRZ OPH DX IMG PST SGM RTA: CPT | Mod: S$GLB,,, | Performed by: OPTOMETRIST

## 2018-08-21 PROCEDURE — 92014 COMPRE OPH EXAM EST PT 1/>: CPT | Mod: S$GLB,,, | Performed by: OPTOMETRIST

## 2018-08-21 PROCEDURE — 92015 DETERMINE REFRACTIVE STATE: CPT | Mod: S$GLB,,, | Performed by: OPTOMETRIST

## 2018-08-21 PROCEDURE — 99999 PR PBB SHADOW E&M-EST. PATIENT-LVL II: CPT | Mod: PBBFAC,,, | Performed by: OPTOMETRIST

## 2018-08-21 NOTE — PATIENT INSTRUCTIONS
DIABETIC RETINOPATHY    The retina is the light-sensitive part of the eye that allows you to see. High blood sugar can damage blood vessels of the retina and cause them to leak or bleed. This damage can lead to abnormal blood vessel growth. This condition is called diabetic retinopathy. It causes no symptoms early in the disease. Later, there may be floaters, blurred vision, poor night vision, partial or complete vision loss.  Early cases of diabetic retinopathy can be treated by carefully controlling blood sugar, blood pressure, and cholesterol. Surgery or laser treatments may help restore lost vision. Laser surgery can shrink abnormal blood vessels or close ones that are leaking.  HOME CARE  1. Take all medicines, including insulin or oral diabetic medicine, exactly as prescribed.  2. Follow your diabetic diet. If you have high cholesterol, follow a low-fat, low-cholesterol diet.  3. Monitor blood sugars as advised.  4. Try to achieve your ideal weight.  5. If you smoke, quit smoking. Tobacco use worsens the effect of diabetes on your blood vessels.  6. If you have high blood pressure, consider buying an automatic blood pressure machine (available at most pharmacies). Use this to monitor your blood pressure and report to your doctor.  7. Exercise regularly.  FOLLOW UP with your doctor or as directed by our staff. You must have a complete eye exam once a year. Untreated diabetic retinopathy can lead to complete loss of vision.    GET PROMPT MEDICAL ATTENTION if any of the following occur:  · Increasing blurriness or any sudden changes in your vision  · Sudden flash of light inside your eye  · New floaters (small dots or strings that seem to be moving across your field of vision)  · Eye pain, redness, or discharge from your eyelid  · New dark spots appearing in your field of vision  · Halos around lights  · Dimness of vision  · Partial or complete loss of vision  © 3282-1872 [employer], [address] [facility],  [place] [address]. All rights reserved. This information is not intended as a substitute for professional medical care. Always follow your healthcare professional's instructions.

## 2018-08-21 NOTE — PROGRESS NOTES
Subjective:       Patient ID: Bessy Nunez is a 58 y.o. male      Chief Complaint   Patient presents with    Diabetic Eye Exam     History of Present Illness  DLS: 5/31/2017 Dr. Mixon.     58 y.o. Male is here for DM check. Glucose this morning was 114. Denies eye pain and f/f. Blurred vision at distance and near w/o correction. Current glasses are broken. Wear +3.00 otc readers.     Eye Meds: AT's  prn OU         Assessment/Plan:     1. Severe nonproliferative diabetic retinopathy of both eyes with macular edema associated with type 2 diabetes mellitus  DME OU on OCT with DR OU. Discussed diagnosis with patient and possible treatment options including lasers, injections, and surgery if needed. Referral to Dr. Mixon for evaluation. Pt left today with appt scheduled.   - Posterior Segment OCT Retina-Both eyes  - Ambulatory referral to Ophthalmology    2. Nuclear sclerosis of both eyes  NVS. Monitor.    3. Refractive error  Will hold on Rx at this time. NI VA OD with refraction. Pt to see retina first for DME evaluation.    Follow-up for Retina consult with Dr. Mixon.

## 2018-08-21 NOTE — LETTER
August 21, 2018      Edgar Nova MD  [address] [place] [address]           [facility]  [address] [place] [address]  Phone: [phone]  Fax: [phone]          Patient: Bessy Nunez   MR Number: [id number]   YOB: 1960   Date of Visit: 8/21/2018       Dear Dr. Edgar Nova:    Thank you for referring Bessy Nunez to me for evaluation. Attached you will find relevant portions of my assessment and plan of care.    If you have questions, please do not hesitate to call me. I look forward to following Bessy Nunez along with you.    Sincerely,    Merlene Gorman, OD    Enclosure  CC:  No Recipients    If you would like to receive this communication electronically, please contact [email or web address] or [phone] to request more information on [employer] [facility] access.    For providers and/or their staff who would like to refer a patient to [facility], please contact us through our one-stop-shop provider referral line, [facility] , at [phone].    If you feel you have received this communication in error or would no longer like to receive these types of communications, please e-mail [email or web address]

## 2018-08-22 ENCOUNTER — TELEPHONE (OUTPATIENT)
Dept: NEUROLOGY | Facility: CLINIC | Age: 58
End: 2018-08-22

## 2018-08-22 ENCOUNTER — PATIENT MESSAGE (OUTPATIENT)
Dept: NEUROLOGY | Facility: CLINIC | Age: 58
End: 2018-08-22

## 2018-08-22 RX ORDER — AZATHIOPRINE 50 MG/1
TABLET ORAL
Qty: 90 TABLET | Refills: 3 | Status: ON HOLD | OUTPATIENT
Start: 2018-08-22 | End: 2018-10-20 | Stop reason: HOSPADM

## 2018-08-29 ENCOUNTER — CLINICAL SUPPORT (OUTPATIENT)
Dept: INFECTIOUS DISEASES | Facility: CLINIC | Age: 58
End: 2018-08-29
Payer: COMMERCIAL

## 2018-08-29 ENCOUNTER — OFFICE VISIT (OUTPATIENT)
Dept: INFECTIOUS DISEASES | Facility: CLINIC | Age: 58
End: 2018-08-29
Payer: COMMERCIAL

## 2018-08-29 VITALS
DIASTOLIC BLOOD PRESSURE: 75 MMHG | SYSTOLIC BLOOD PRESSURE: 113 MMHG | BODY MASS INDEX: 32.22 KG/M2 | HEART RATE: 98 BPM | TEMPERATURE: 99 F | WEIGHT: 225.06 LBS | HEIGHT: 70 IN

## 2018-08-29 DIAGNOSIS — Z71.85 VACCINE COUNSELING: ICD-10-CM

## 2018-08-29 DIAGNOSIS — D84.9 ACQUIRED IMMUNOCOMPROMISED STATE: ICD-10-CM

## 2018-08-29 DIAGNOSIS — I77.6 CNS VASCULITIS: Primary | ICD-10-CM

## 2018-08-29 DIAGNOSIS — Z23 NEED FOR HEPATITIS A AND B VACCINATION: ICD-10-CM

## 2018-08-29 DIAGNOSIS — Z23 NEED FOR TDAP VACCINATION: ICD-10-CM

## 2018-08-29 DIAGNOSIS — Z23 NEED FOR PNEUMOCOCCAL VACCINATION: ICD-10-CM

## 2018-08-29 PROCEDURE — 3074F SYST BP LT 130 MM HG: CPT | Mod: CPTII,S$GLB,, | Performed by: NURSE PRACTITIONER

## 2018-08-29 PROCEDURE — 90471 IMMUNIZATION ADMIN: CPT | Mod: S$GLB,,, | Performed by: INTERNAL MEDICINE

## 2018-08-29 PROCEDURE — 3078F DIAST BP <80 MM HG: CPT | Mod: CPTII,S$GLB,, | Performed by: NURSE PRACTITIONER

## 2018-08-29 PROCEDURE — 90715 TDAP VACCINE 7 YRS/> IM: CPT | Mod: S$GLB,,, | Performed by: INTERNAL MEDICINE

## 2018-08-29 PROCEDURE — 99214 OFFICE O/P EST MOD 30 MIN: CPT | Mod: S$GLB,,, | Performed by: NURSE PRACTITIONER

## 2018-08-29 PROCEDURE — 90472 IMMUNIZATION ADMIN EACH ADD: CPT | Mod: S$GLB,,, | Performed by: INTERNAL MEDICINE

## 2018-08-29 PROCEDURE — 90732 PPSV23 VACC 2 YRS+ SUBQ/IM: CPT | Mod: S$GLB,,, | Performed by: INTERNAL MEDICINE

## 2018-08-29 PROCEDURE — 99999 PR PBB SHADOW E&M-EST. PATIENT-LVL V: CPT | Mod: PBBFAC,,, | Performed by: NURSE PRACTITIONER

## 2018-08-29 PROCEDURE — 90636 HEP A/HEP B VACC ADULT IM: CPT | Mod: S$GLB,,, | Performed by: INTERNAL MEDICINE

## 2018-08-29 PROCEDURE — 3008F BODY MASS INDEX DOCD: CPT | Mod: CPTII,S$GLB,, | Performed by: NURSE PRACTITIONER

## 2018-08-29 NOTE — PATIENT INSTRUCTIONS
1.  Tdap, Twinrix (Hepatitis A/B), Pneumovax (PPSV 23) today.   2.  2nd Twinrix in 1 month ( on or after 9/29/2018) and 3rd Twinrix in 6 months (on or after 3/1/19)  3.  Flu vaccine when available.   4.  Recommend Shingrix vaccine series (two doses, 2-6 months apart).  We are currently out of stock.  I want to clear that it is ok with Dr. Love and if so, recommend you ask for this at your pharmacy.  You should not need a prescription, but call me if you do.    5.  Call me with any concerns.

## 2018-08-29 NOTE — Clinical Note
Lucila:  Will you please call Mr. Nunez's pharmacy and ask if he needs a prescription to get the Shingrix vaccine series?  Let me know.  If not, will you call him (or his wife) and let them know it is okay with Dr. Love to get the Shingrix. He needs the flu too.  Thanks and let me know if you have any questions.

## 2018-08-29 NOTE — PROGRESS NOTES
Pt received the Hepatitis A/B, Pneumovax 23 and Tdap vaccinations. Pt tolerated the injections well. Return appts provided. Pt left the unit in NAD.

## 2018-08-29 NOTE — PROGRESS NOTES
Subjective:      Patient ID: Jeremias Lucas is a 58 y.o. male.    Chief Complaint:Immunotherapy      History of Present Illness    Mr. Jeremias Lucas is a 58 year old gentleman with HTN, history of CVA, HASH, DM, and CNS vasculitis who was referred by Neurology for vaccine management and ID evaluation in anticipation of immunosuppression.  He is accompanied by his wife.  He reportedly completed 12 treatments of cytoxan with heme-onc, which / affected his memory and cognition.     Plan is to start Imuran for vasculitis.      Patient denies recent fevers, chills, infective illnesses.     Diabetic - denies history of foot wounds or bone infections.   Denies current or long term steroid use.   Denies splenectomy    Denies history of chronic, recurring infections of sinuses, throat, bladder/kidneys, intestines, skin, reproductive organs, teeth or gums.     History of chickenpox.  No shingles.   Denies history of syphilis, gonorrhea, other STDs/viral hepatitis/ or other infective illnesses.     Denies known exposure to TB  No history of residence in coccidioides endemic areas  Travel to [place] ([facility]) two weeks,short trips to [place].  No associated illnesses    Animal exposures: cats.  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 Seronegative  Hepatitis B - denies. Seronegative  Pneumonia - Prevnar 9/19/2017  Shingles  - denies     Results for JEREMIAS LUCAS (MRN [id number]) as of 8/31/2018 18:16   Ref. Range 8/15/2018 08:00 8/15/2018 08:06   Hep B Core Total Ab Unknown  Negative   Hep B S Ab Unknown  Negative   Hepatitis B Surface Ag Unknown  Negative   Hepatitis C Ab Unknown  Negative   Mitogen - Nil Latest Ref Range: See text IU/mL >10.000    NIL Latest Ref Range: See text IU/mL 0.030    TB Gold Plus Unknown Negative    TB1 - Nil  Latest Ref Range: See text IU/mL 0.060    TB2 - Nil Latest Ref Range: See text IU/mL 0.010    HIV 1/2 Ag/Ab Latest Ref Range: Negative   Negative   RPR Latest Ref Range: Non-reactive   Non-reactive   Strongyloides Ab IgG Latest Ref Range: Negative   Negative   Varicella IgG Latest Ref Range: 0.00 - 0.90 ISR  3.80 (H)   Varicella Interpretation Latest Ref Range: Negative   Positive (A)     Results for JEREMIAS LUCAS (MRN [id number]) as of 9/2/2018 15:13   Ref. Range 8/15/2018 08:06   Hepatitis A Antibody IgG Unknown Negative     Review of Systems   Constitution: Positive for weakness and malaise/fatigue. Negative for chills, decreased appetite, fever, night sweats, weight gain and weight loss.   HENT: Negative for congestion, ear pain, hearing loss, hoarse voice, sore throat and tinnitus.    Eyes: Positive for blurred vision and visual disturbance. Negative for redness.   Cardiovascular: Negative for chest pain, leg swelling and palpitations.   Respiratory: Negative for cough, hemoptysis, shortness of breath, sputum production and wheezing.    Endocrine: Negative for cold intolerance and heat intolerance.   Hematologic/Lymphatic: Negative for adenopathy. Does not bruise/bleed easily.   Skin: Negative for dry skin, itching, rash and suspicious lesions.   Musculoskeletal: Negative for back pain, joint pain, myalgias and neck pain.   Gastrointestinal: Negative for abdominal pain, constipation, diarrhea, heartburn, nausea and vomiting.   Genitourinary: Negative for dysuria, flank pain, frequency, hematuria, hesitancy and urgency.   Neurological: Negative for dizziness, headaches, numbness and paresthesias.   Psychiatric/Behavioral: Negative for depression and memory loss. The patient does not have insomnia and is not nervous/anxious.    Allergic/Immunologic: Negative for environmental allergies, HIV exposure, hives and persistent infections.     Objective:   Physical Exam   Constitutional: He is oriented to person, place,  and time. He appears well-developed and well-nourished.   Obese.     HENT:   Head: Normocephalic and atraumatic.   Mouth/Throat: Uvula is midline, oropharynx is clear and moist and mucous membranes are normal. He does not have dentures. No oral lesions. Normal dentition. No dental abscesses, lacerations or dental caries. No oropharyngeal exudate.   Multiple teeth worn, eroded       Eyes: Conjunctivae and lids are normal. No scleral icterus.   Neck: Neck supple.   Cardiovascular: Normal rate and regular rhythm. Exam reveals no gallop and no friction rub.   No murmur heard.  Pulmonary/Chest: Effort normal and breath sounds normal. No respiratory distress. He has no decreased breath sounds. He has no wheezes. He has no rhonchi. He has no rales.   Abdominal: Soft. Normal appearance, normal aorta and bowel sounds are normal. He exhibits no distension and no mass. There is no hepatosplenomegaly. There is no tenderness. There is no rebound and no guarding.   Musculoskeletal: He exhibits no edema.   Lymphadenopathy:        Head (right side): No submental, no submandibular, no tonsillar, no preauricular, no posterior auricular and no occipital adenopathy present.        Head (left side): No submental, no submandibular, no tonsillar, no preauricular, no posterior auricular and no occipital adenopathy present.     He has no cervical adenopathy.     He has no axillary adenopathy.        Right: No inguinal, no supraclavicular and no epitrochlear adenopathy present.        Left: No inguinal, no supraclavicular and no epitrochlear adenopathy present.   Neurological: He is alert and oriented to person, place, and time. No cranial nerve deficit.   Appropriate, but slowed responses   Skin: Skin is warm, dry and intact. No lesion and no rash noted. He is not diaphoretic. No erythema. No pallor.   Psychiatric: He has a normal mood and affect. His behavior is normal.   Vitals reviewed.    Assessment:       1. CNS vasculitis    2.  Acquired immunocompromised state    3. Need for Tdap vaccination    4. Need for hepatitis A and B vaccination    5. Need for pneumococcal vaccination    6. Vaccine counseling          Plan:    1. Screening for chronic infections:   HIV, HCV, Hep B surface antigen, RPR, quantiferon gold, strongyloides negative.   2. Immunizations:  Tdap, Pneumovax, Twinrix (Hep A/B) series initiated.  Recommend Shingrix (will get Neurology ok) series. Flu vaccine when available. The patient was encouraged to contact us about any problems that may develop after immunization and possible side effects were reviewed.   3. Counseling: I discussed with patient and his wife the risk for increased susceptibility to infections with Imuran therapy. He has been counseled on the importance of vaccinations including but not limited to a yearly flu vaccine. Specific guidance has beenprovided to the patient regarding the patient's occupation, hobbies and activities to avoid future infections, including but not limited to avoiding undercooked meats and seafood, proper hygiene and contact with animals.   4. Follow up as needed.

## 2018-08-29 NOTE — LETTER
September 2, 2018      GABRIEL Somers, CNS  [address]  [facility] [address]           [facility]  [address]  [facility] [address]  Phone: [phone]  Fax: [phone]          Patient: Bessy Nunez   MR Number: [id number]   YOB: 1960   Date of Visit: 8/29/2018       Dear Beti Boswell:    Thank you for referring Bessy uNnez to me for evaluation. Attached you will find relevant portions of my assessment and plan of care.    If you have questions, please do not hesitate to call me. I look forward to following Bessy Nunez along with you.    Sincerely,    GABRIEL Miller, ANP    Enclosure  CC:  No Recipients    If you would like to receive this communication electronically, please contact [email or web address] or [phone] to request more information on [employer] Link access.    For providers and/or their staff who would like to refer a patient to [facility], please contact us through our one-stop-shop provider referral line, [facility], at [phone].    If you feel you have received this communication in error or would no longer like to receive these types of communications, please e-mail [email or web address]

## 2018-08-30 ENCOUNTER — DOCUMENTATION ONLY (OUTPATIENT)
Dept: NEUROLOGY | Facility: CLINIC | Age: 58
End: 2018-08-30

## 2018-09-04 ENCOUNTER — TELEPHONE (OUTPATIENT)
Dept: INFECTIOUS DISEASES | Facility: CLINIC | Age: 58
End: 2018-09-04

## 2018-09-04 NOTE — TELEPHONE ENCOUNTER
Called patient back and let know can go get shingrix from pharmacy. Stated to patient called pharmacy and they stated he doesn't need a prescription to get vaccine and  approved of patient getting vaccine per ron berger. Patient stated he would go in and get it.

## 2018-09-04 NOTE — TELEPHONE ENCOUNTER
----- Message from Raegan Huggins MA sent at 9/4/2018  9:11 AM CDT -----  Contact: hillary [phone]      ----- Message -----  From: Rachna Jhaveri  Sent: 9/4/2018   9:02 AM  To: Brittany Alvarez Staff    .[facility]    Who Left Message for Patient: Lucila HARDING  Communication Preference:phone   Additional Information:

## 2018-09-07 ENCOUNTER — OFFICE VISIT (OUTPATIENT)
Dept: PSYCHIATRY | Facility: CLINIC | Age: 58
End: 2018-09-07
Payer: COMMERCIAL

## 2018-09-07 DIAGNOSIS — Z63.8 FAMILY CONFLICT: ICD-10-CM

## 2018-09-07 DIAGNOSIS — F32.A DEPRESSIVE DISORDER: ICD-10-CM

## 2018-09-07 DIAGNOSIS — F02.818 MAJOR NEUROCOGNITIVE DISORDER DUE TO ANOTHER MEDICAL CONDITION WITH BEHAVIORAL DISTURBANCE: ICD-10-CM

## 2018-09-07 PROCEDURE — 90847 FAMILY PSYTX W/PT 50 MIN: CPT | Mod: S$GLB,,, | Performed by: SOCIAL WORKER

## 2018-09-07 SDOH — SOCIAL DETERMINANTS OF HEALTH (SDOH): OTHER SPECIFIED PROBLEMS RELATED TO PRIMARY SUPPORT GROUP: Z63.8

## 2018-09-07 NOTE — PROGRESS NOTES
"Family Psychotherapy (PhD/LCSW)    9/7/2018    Site: [facility]    Length of service: 50    Therapeutic intervention: 90847-Family therapy with patient; needed because Bessy has neurocognitive disorder and needs support of family to be able to implement any changes/recommendations    Persons present: Bessy, son Marky    Interval history: Bessy presented with casual dress and adequate hygiene.  Bessy was more disoriented than in previous sessions (disoriented to date & reason for visit but oriented to person and general place "[facility]").  Reviewed progress that Bessy and family have made since last session: Bessy with improved walk time (as of last neuro follow up), wife saw a psychiatrist and has a visit with a therapist in October, Marky is giving the couple more space and has taken over some of his bills.  However, other challenges persist.  Marky reports ongoing negativity from Mrs. Nunez and shared that she is overwhelmed with caring for Bessy, partially because he is apathetic and at other times refuses her instructions (especially to take medications).  They've not hired an aide to help with this morning routine.  Marky remains frustrated with both parents and believes they do not make decisions that are good for their health.  Like Bekah, Marky also struggles to accept that ongoing apathy in his father may be long-term/permanent in nature, as a result of his stroke.  Bessy, too, has limited insight into the need for many of his medications and for the reasons that others are worried for him.  JULIAN will follow up with contact information for private duty agencies in their home area and agreed to discuss family concerns for pt's prognosis with Dr. America PsyD.      Just prior to session beginning, JULIAN spoke with Bekah by phone, with Bessy and son present.  Bekah shared that Bessy saw optometrist, who found "blood behind the eye" and that she learned that Effexor can contribute to this problem with " individuals, who are diabetic. She is concerned about him continuing the Effexor and wanted to discuss this with his providers.  He sees the ophthalmologist soon and they will discuss options for treating the eye condition.  Furthermore, Bekah voiced concerns that Zoloft was added to Bessy's allergy list and would like it removed as he is not actually allergic to the medication.  Will discuss both concerns with the neurology team.      Target symptoms: recurrent depression, adjustment     Patient's interpersonal/verbal exchanges: 15142 -Family therapy with patient.  Wife not present today.  Bessy and son able to engage in direct conversation of issues, but Bessy's insight is limited.    Progress toward goals: progressing slowly    Diagnosis:  F32.9 Unspecified Depressive Disorder    F02.81 Major neurocognitive disorder due to another medical condition with behavioral disturbance    Z63.8 Family Conflict     Plan: family psychotherapy   SW to provided family with private duty aide agencies in their area.  Will discuss case with Dr. Cross; discuss need for repeat NP testing, if appropriate.   Couple to increase activities outside/inside home to 2/week - not doing currently.  Pt to continue with outpatient physical therapy - not doing, currently .       Return to clinic: ~ 2 weeks, will call to schedule with Bekah and Bessy.  Next full family meeting on October 1st.

## 2018-09-10 ENCOUNTER — OFFICE VISIT (OUTPATIENT)
Dept: OPHTHALMOLOGY | Facility: CLINIC | Age: 58
End: 2018-09-10
Payer: COMMERCIAL

## 2018-09-10 VITALS — SYSTOLIC BLOOD PRESSURE: 121 MMHG | HEART RATE: 103 BPM | DIASTOLIC BLOOD PRESSURE: 88 MMHG

## 2018-09-10 DIAGNOSIS — E11.3312 TYPE 2 DIABETES MELLITUS WITH LEFT EYE AFFECTED BY MODERATE NONPROLIFERATIVE RETINOPATHY AND MACULAR EDEMA, WITH LONG-TERM CURRENT USE OF INSULIN: ICD-10-CM

## 2018-09-10 DIAGNOSIS — H25.811 MIXED TYPE AGE-RELATED CATARACT, RIGHT EYE: ICD-10-CM

## 2018-09-10 DIAGNOSIS — Z79.4 TYPE 2 DIABETES MELLITUS WITH LEFT EYE AFFECTED BY MODERATE NONPROLIFERATIVE RETINOPATHY AND MACULAR EDEMA, WITH LONG-TERM CURRENT USE OF INSULIN: ICD-10-CM

## 2018-09-10 DIAGNOSIS — H25.12 NUCLEAR SCLEROSIS, LEFT: ICD-10-CM

## 2018-09-10 DIAGNOSIS — E11.3391 TYPE 2 DIABETES MELLITUS WITH RIGHT EYE AFFECTED BY MODERATE NONPROLIFERATIVE RETINOPATHY WITHOUT MACULAR EDEMA, WITH LONG-TERM CURRENT USE OF INSULIN: Primary | ICD-10-CM

## 2018-09-10 DIAGNOSIS — Z79.4 TYPE 2 DIABETES MELLITUS WITH RIGHT EYE AFFECTED BY MODERATE NONPROLIFERATIVE RETINOPATHY WITHOUT MACULAR EDEMA, WITH LONG-TERM CURRENT USE OF INSULIN: Primary | ICD-10-CM

## 2018-09-10 PROCEDURE — 92134 CPTRZ OPH DX IMG PST SGM RTA: CPT | Mod: S$GLB,,, | Performed by: OPHTHALMOLOGY

## 2018-09-10 PROCEDURE — 92012 INTRM OPH EXAM EST PATIENT: CPT | Mod: S$GLB,,, | Performed by: OPHTHALMOLOGY

## 2018-09-10 PROCEDURE — 92225 PR SPECIAL EYE EXAM, INITIAL: CPT | Mod: RT,S$GLB,, | Performed by: OPHTHALMOLOGY

## 2018-09-10 PROCEDURE — 99999 PR PBB SHADOW E&M-EST. PATIENT-LVL III: CPT | Mod: PBBFAC,,, | Performed by: OPHTHALMOLOGY

## 2018-09-10 NOTE — PROGRESS NOTES
HPI     DME OU per Dr. Gorman   DLS-08/21/2018 Dr. Gorman     Pt sts vision fluctuates some days worse than other.  Notes it to be   blurrier overall OU.  Thinks OD may be worse eye for entire life but   unclear how much worse.  Denies pain.  No f/f OU.    Taking Effexor x 2 mo and read it causes swelling and bleeding behind the   eyes thinks problems may be due to med   (-)Flashes (-)Floaters  (-)Photophobia  (+)Glare    Visine PRN       Last edited by Isael Mixon MD on 9/11/2018  6:04 PM. (History)        [place] SDOCT:   OD: good quality, good contour, stable from 8/21/18 and 5/31/17  OS: good quality, cystic spaces and thickening at fovea, new from 8/21/18 and 5/31/17      Assessment /Plan     For exam results, see Encounter Report.    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, with long-term current use of insulin  -     Flourescein Angiography - OU - Both Eyes; Future  -     Color Fundus Photography - OU - Both Eyes; Future  -     Posterior Segment OCT Retina-Both eyes; Future  -     Posterior Segment OCT Retina-Both eyes    Type 2 diabetes mellitus with left eye affected by moderate nonproliferative retinopathy and macular edema, with long-term current use of insulin  -     Posterior Segment OCT Retina-Both eyes    Mixed type age-related cataract, right eye    Nuclear sclerosis, left      Va dec OD seems out of prop to cataract and without ME  Pt has h/o cerebral vasculitis and left subthalamic stroke and JAYDEN    Recommend FA repeat.  Transit OD  If FA not explaining Va OD, would consider VF and neurophth eval    ME OS with cystic thickening.  Will consider treatment options after FA    DR appears better OU.  May have had vasculitis component

## 2018-09-10 NOTE — LETTER
September 11, 2018      Merlene Gorman, OD  [address]  [place] [address]           [facility]  [address]  [place] [address]  Phone: [phone]  Fax: [phone]          Patient: Bessy Nunez   MR Number: [id number]   YOB: 1960   Date of Visit: 9/10/2018       Dear Dr. Merlene Gorman:    Thank you for referring Bessy Nunez to me for evaluation. Attached you will find relevant portions of my assessment and plan of care.    If you have questions, please do not hesitate to call me. I look forward to following Bessy Nunez along with you.    Sincerely,    Isael Mixon MD    Enclosure  CC:  No Recipients    If you would like to receive this communication electronically, please contact [email or web address] or [phone] to request more information on [employer] Link access.    For providers and/or their staff who would like to refer a patient to [facility], please contact us through our one-stop-shop provider referral line, [facility] , at [phone].    If you feel you have received this communication in error or would no longer like to receive these types of communications, please e-mail [email or web address]

## 2018-09-12 NOTE — PROGRESS NOTES
"Subjective:       Patient ID: Bessy Nunez is a 58 y.o. male who presents today for a routine clinic visit for CNS vasculitis. The history has been provided by the patient. He is accompanied by his wife and son.    HPI:  · DMT: Imuran 150mg daily  · Side effects from DMT? No  · Taking vitamin D3 as recommended? Yes -  Dose: 5000 units daily     He saw Dr. Mixon, his optometrist, who said that his "eyesight has actually improved." He does have "bleeding and swelling behind the eyes." His wife is concerned that Effexor might make this worse.   He seems to be sleeping more since the Effexor has been increased.     His blood pressure was elevated this morning. His wife is not sure if he took all of his BP medication this morning.     He is taking Imuran now, and his wife says that "this does not compare to the chemo (Cytoxan). He is forgetting things and is sleeping 22 hours a day and does not engage in conversation. He does not initiate activities, but will usually do something if instructed to do so. However, he may forget a step in the process (such as getting water to take his medications). He does not seem to concentrate or stay focused on anything.     He has seen the infectious disease provider and will receive appropriate vaccines.     He has been discharged from physical therapy.     He continues to do counseling with Sharda Gonzalez LCSW. He does endorse a lack of interest in most things. He doesn't give his wife any issues with attending appointments, but it takes 3-4 hours for him to get ready when he has to go somewhere.     He denies any bowel/bladder issues.     A man that was helpful to his wife with Bessy was killed in a motorcycle accident two weeks ago, so   she has not had much chance for respite lately. She is supposed to start seeing a psychiatrist in October.     He does have sleep apnea, but his machine was taken away because he was not using it for enough hours a day.     SOCIAL " HISTORY  Social History     Tobacco Use    Smoking status: Never Smoker    Smokeless tobacco: Never Used   Substance Use Topics    Alcohol use: No     Alcohol/week: 0.0 oz    Drug use: No     Living arrangements - the patient lives with his wife  Employment: receives disability        Objective:        1. 25 foot timed walk: 8.83 seconds today WITHOUT assistance; 7.65 seconds at last visit WITHOUT assistance    Neurologic Exam      He continues to have flat affect and does not participate much in conversation today.   Extraocular movements are intact.   Facial movements are normal. He has normal strength in upper and lower extremities. Rapid sequential movements are normal in the upper extremities. Vibratory sense is intact.  Reflexes are 2+ and symmetric throughout.      Romberg is positive, but only mild sway.   Finger to nose coordination is normal.   He is able to follow 3 step commands and does so without hesitation.      He DOES NOT know the week, month, or date, but does know the year. He knows the . He knows that he is at [facility] the main campus.     Imaging:     No new imaging to review today.     Labs:     Lab Results   Component Value Date    HBAISNDE23QU 36 08/15/2018    LCOUGLUC27CI 11 (L) 05/17/2017     Lab Results   Component Value Date    OA3JDOOB 87.9 (H) 08/15/2018    ABSOLUTECD3 1271 08/15/2018    QY7CKVMM 22.6 08/15/2018    ABSOLUTECD8 327 08/15/2018    GF5NRMMK 61.7 (H) 08/15/2018    ABSOLUTECD4 892 08/15/2018    LABCD48 2.73 08/15/2018     Lab Results   Component Value Date    WBC 6.93 08/15/2018    RBC 4.05 (L) 08/15/2018    HGB 11.6 (L) 08/15/2018    HCT 34.0 (L) 08/15/2018    MCV 84 08/15/2018    MCH 28.6 08/15/2018    MCHC 34.1 08/15/2018    RDW 15.3 (H) 08/15/2018     08/15/2018    MPV 9.1 (L) 08/15/2018    GRAN 4.7 08/15/2018    GRAN 68.2 08/15/2018    LYMPH 1.4 08/15/2018    LYMPH 20.3 08/15/2018    MONO 0.6 08/15/2018    MONO 9.2  08/15/2018    EOS 0.1 08/15/2018    BASO 0.03 08/15/2018    EOSINOPHIL 1.9 08/15/2018    BASOPHIL 0.4 08/15/2018     Sodium   Date Value Ref Range Status   08/15/2018 141 136 - 145 mmol/L Final     Potassium   Date Value Ref Range Status   08/15/2018 3.8 3.5 - 5.1 mmol/L Final     Chloride   Date Value Ref Range Status   08/15/2018 105 95 - 110 mmol/L Final     CO2   Date Value Ref Range Status   08/15/2018 23 23 - 29 mmol/L Final     Glucose   Date Value Ref Range Status   08/15/2018 120 (H) 70 - 110 mg/dL Final     BUN, Bld   Date Value Ref Range Status   08/15/2018 18 6 - 20 mg/dL Final     Creatinine   Date Value Ref Range Status   08/15/2018 1.1 0.5 - 1.4 mg/dL Final     Calcium   Date Value Ref Range Status   08/15/2018 10.1 8.7 - 10.5 mg/dL Final     Total Protein   Date Value Ref Range Status   08/15/2018 7.5 6.0 - 8.4 g/dL Final     Albumin   Date Value Ref Range Status   08/15/2018 4.2 3.5 - 5.2 g/dL Final   10/11/2013 4.3 3.6 - 5.1 g/dL Final     Comment:     @ Test Performed By:  [facility] Vance Estrada M.D., JAYCOB.,   [address] [place] [id number]  [facility] #[id number]     Total Bilirubin   Date Value Ref Range Status   08/15/2018 0.5 0.1 - 1.0 mg/dL Final     Comment:     For infants and newborns, interpretation of results should be based  on gestational age, weight and in agreement with clinical  observations.  Premature Infant recommended reference ranges:  Up to 24 hours.............<8.0 mg/dL  Up to 48 hours............<12.0 mg/dL  3-5 days..................<15.0 mg/dL  6-29 days.................<15.0 mg/dL       Alkaline Phosphatase   Date Value Ref Range Status   08/15/2018 71 55 - 135 U/L Final     AST   Date Value Ref Range Status   08/15/2018 17 10 - 40 U/L Final     ALT   Date Value Ref Range Status   08/15/2018 17 10 - 44 U/L Final     Anion Gap   Date Value Ref Range Status   08/15/2018 13 8 - 16 mmol/L Final     eGFR if African  American   Date Value Ref Range Status   08/15/2018 >60 >60 mL/min/1.73 m^2 Final     eGFR if non    Date Value Ref Range Status   08/15/2018 >60 >60 mL/min/1.73 m^2 Final     Comment:     Calculation used to obtain the estimated glomerular filtration  rate (eGFR) is the CKD-EPI equation.          Diagnosis/Assessment/Plan:    1. CNS vasculitis   · Assessment: Bessy's walk time is slower today, and he is not as engaged during the visit as he has been recently. His wife reports that he is sleeping more and does not self-motivate or participate in conversation at home. I will discuss with Dr. Love.   · Imaging:Will likely plan brain MRI prior to next visit.   · Disease Modifying Therapies: Continue Imuran for now. Will check CBC and LFT today.     2. Symptom Assessment / Management  · Sleep Disturbance: I encouraged him to go back to Dr. Espinoza to see about getting CPAP back.   · Cognitive: Will monitor.   · Mood Disorder: Continue Effexor for now.       Over 50% of this 40 minute visit was spent in direct face to face counseling of the patient about MS, DMT considerations, and MS symptom management. The patient agrees with the plan of care. He has an appt with Dr. Love in late October.    Beti Boswell, [facility]-BC, MSCN      There are no diagnoses linked to this encounter.

## 2018-09-13 ENCOUNTER — LAB VISIT (OUTPATIENT)
Dept: LAB | Facility: HOSPITAL | Age: 58
End: 2018-09-13
Payer: COMMERCIAL

## 2018-09-13 ENCOUNTER — OFFICE VISIT (OUTPATIENT)
Dept: NEUROLOGY | Facility: CLINIC | Age: 58
End: 2018-09-13
Payer: COMMERCIAL

## 2018-09-13 VITALS
SYSTOLIC BLOOD PRESSURE: 146 MMHG | HEIGHT: 70 IN | HEART RATE: 131 BPM | WEIGHT: 219.38 LBS | BODY MASS INDEX: 31.41 KG/M2 | DIASTOLIC BLOOD PRESSURE: 93 MMHG

## 2018-09-13 DIAGNOSIS — Z29.89 PROPHYLACTIC IMMUNOTHERAPY: ICD-10-CM

## 2018-09-13 DIAGNOSIS — Z79.899 HIGH RISK MEDICATION USE: ICD-10-CM

## 2018-09-13 DIAGNOSIS — R41.89 COGNITIVE IMPAIRMENT: ICD-10-CM

## 2018-09-13 DIAGNOSIS — Z71.89 COUNSELING REGARDING GOALS OF CARE: ICD-10-CM

## 2018-09-13 DIAGNOSIS — I77.6 CNS VASCULITIS: ICD-10-CM

## 2018-09-13 DIAGNOSIS — G47.30 SLEEP APNEA, UNSPECIFIED TYPE: ICD-10-CM

## 2018-09-13 DIAGNOSIS — I77.6 CNS VASCULITIS: Primary | ICD-10-CM

## 2018-09-13 LAB
ALBUMIN SERPL BCP-MCNC: 4.3 G/DL
ALP SERPL-CCNC: 68 U/L
ALT SERPL W/O P-5'-P-CCNC: 14 U/L
ANION GAP SERPL CALC-SCNC: 12 MMOL/L
AST SERPL-CCNC: 20 U/L
BASOPHILS # BLD AUTO: 0.04 K/UL
BASOPHILS NFR BLD: 0.6 %
BILIRUB SERPL-MCNC: 0.8 MG/DL
BUN SERPL-MCNC: 15 MG/DL
CALCIUM SERPL-MCNC: 10.5 MG/DL
CHLORIDE SERPL-SCNC: 104 MMOL/L
CO2 SERPL-SCNC: 25 MMOL/L
CREAT SERPL-MCNC: 1.2 MG/DL
DIFFERENTIAL METHOD: ABNORMAL
EOSINOPHIL # BLD AUTO: 0.2 K/UL
EOSINOPHIL NFR BLD: 2.2 %
ERYTHROCYTE [DISTWIDTH] IN BLOOD BY AUTOMATED COUNT: 14.9 %
EST. GFR  (AFRICAN AMERICAN): >60 ML/MIN/1.73 M^2
EST. GFR  (NON AFRICAN AMERICAN): >60 ML/MIN/1.73 M^2
GLUCOSE SERPL-MCNC: 104 MG/DL
HCT VFR BLD AUTO: 35.3 %
HGB BLD-MCNC: 12 G/DL
IMM GRANULOCYTES # BLD AUTO: 0.01 K/UL
IMM GRANULOCYTES NFR BLD AUTO: 0.1 %
LYMPHOCYTES # BLD AUTO: 1.6 K/UL
LYMPHOCYTES NFR BLD: 23.5 %
MCH RBC QN AUTO: 29.3 PG
MCHC RBC AUTO-ENTMCNC: 34 G/DL
MCV RBC AUTO: 86 FL
MONOCYTES # BLD AUTO: 0.8 K/UL
MONOCYTES NFR BLD: 12.1 %
NEUTROPHILS # BLD AUTO: 4.2 K/UL
NEUTROPHILS NFR BLD: 61.5 %
NRBC BLD-RTO: 0 /100 WBC
PLATELET # BLD AUTO: 218 K/UL
PMV BLD AUTO: 9.3 FL
POTASSIUM SERPL-SCNC: 3.4 MMOL/L
PROT SERPL-MCNC: 8 G/DL
RBC # BLD AUTO: 4.09 M/UL
SODIUM SERPL-SCNC: 141 MMOL/L
WBC # BLD AUTO: 6.8 K/UL

## 2018-09-13 PROCEDURE — 99215 OFFICE O/P EST HI 40 MIN: CPT | Mod: S$GLB,,, | Performed by: CLINICAL NURSE SPECIALIST

## 2018-09-13 PROCEDURE — 3077F SYST BP >= 140 MM HG: CPT | Mod: CPTII,S$GLB,, | Performed by: CLINICAL NURSE SPECIALIST

## 2018-09-13 PROCEDURE — 3008F BODY MASS INDEX DOCD: CPT | Mod: CPTII,S$GLB,, | Performed by: CLINICAL NURSE SPECIALIST

## 2018-09-13 PROCEDURE — 36415 COLL VENOUS BLD VENIPUNCTURE: CPT

## 2018-09-13 PROCEDURE — 3080F DIAST BP >= 90 MM HG: CPT | Mod: CPTII,S$GLB,, | Performed by: CLINICAL NURSE SPECIALIST

## 2018-09-13 PROCEDURE — 80053 COMPREHEN METABOLIC PANEL: CPT

## 2018-09-13 PROCEDURE — 99999 PR PBB SHADOW E&M-EST. PATIENT-LVL III: CPT | Mod: PBBFAC,,, | Performed by: CLINICAL NURSE SPECIALIST

## 2018-09-13 PROCEDURE — 85025 COMPLETE CBC W/AUTO DIFF WBC: CPT

## 2018-09-14 ENCOUNTER — PATIENT MESSAGE (OUTPATIENT)
Dept: NEUROLOGY | Facility: CLINIC | Age: 58
End: 2018-09-14

## 2018-09-14 DIAGNOSIS — I77.6 VASCULITIS, CNS: ICD-10-CM

## 2018-09-17 ENCOUNTER — PATIENT MESSAGE (OUTPATIENT)
Dept: NEUROLOGY | Facility: CLINIC | Age: 58
End: 2018-09-17

## 2018-09-19 DIAGNOSIS — F32.A DEPRESSION, UNSPECIFIED DEPRESSION TYPE: ICD-10-CM

## 2018-09-19 RX ORDER — VENLAFAXINE HYDROCHLORIDE 75 MG/1
150 CAPSULE, EXTENDED RELEASE ORAL DAILY
Qty: 180 CAPSULE | Refills: 1 | Status: SHIPPED | OUTPATIENT
Start: 2018-09-19 | End: 2018-11-05

## 2018-10-01 ENCOUNTER — PROCEDURE VISIT (OUTPATIENT)
Dept: OPHTHALMOLOGY | Facility: CLINIC | Age: 58
End: 2018-10-01
Payer: COMMERCIAL

## 2018-10-01 ENCOUNTER — CLINICAL SUPPORT (OUTPATIENT)
Dept: INFECTIOUS DISEASES | Facility: CLINIC | Age: 58
End: 2018-10-01
Payer: COMMERCIAL

## 2018-10-01 ENCOUNTER — OFFICE VISIT (OUTPATIENT)
Dept: PSYCHIATRY | Facility: CLINIC | Age: 58
End: 2018-10-01
Payer: COMMERCIAL

## 2018-10-01 VITALS — HEART RATE: 80 BPM | DIASTOLIC BLOOD PRESSURE: 85 MMHG | SYSTOLIC BLOOD PRESSURE: 121 MMHG

## 2018-10-01 DIAGNOSIS — E11.3493 TYPE 2 DIABETES MELLITUS WITH BOTH EYES AFFECTED BY SEVERE NONPROLIFERATIVE RETINOPATHY WITHOUT MACULAR EDEMA, WITH LONG-TERM CURRENT USE OF INSULIN: Primary | ICD-10-CM

## 2018-10-01 DIAGNOSIS — Z23 NEED FOR HEPATITIS A AND B VACCINATION: ICD-10-CM

## 2018-10-01 DIAGNOSIS — F32.A DEPRESSIVE DISORDER: ICD-10-CM

## 2018-10-01 DIAGNOSIS — Z63.8 FAMILY CONFLICT: ICD-10-CM

## 2018-10-01 DIAGNOSIS — F02.818 MAJOR NEUROCOGNITIVE DISORDER DUE TO ANOTHER MEDICAL CONDITION WITH BEHAVIORAL DISTURBANCE: ICD-10-CM

## 2018-10-01 DIAGNOSIS — I77.6 CNS VASCULITIS: ICD-10-CM

## 2018-10-01 DIAGNOSIS — D84.9 ACQUIRED IMMUNOCOMPROMISED STATE: ICD-10-CM

## 2018-10-01 DIAGNOSIS — Z79.4 TYPE 2 DIABETES MELLITUS WITH BOTH EYES AFFECTED BY SEVERE NONPROLIFERATIVE RETINOPATHY WITHOUT MACULAR EDEMA, WITH LONG-TERM CURRENT USE OF INSULIN: Primary | ICD-10-CM

## 2018-10-01 PROCEDURE — 90471 IMMUNIZATION ADMIN: CPT | Mod: S$GLB,,, | Performed by: INTERNAL MEDICINE

## 2018-10-01 PROCEDURE — 90847 FAMILY PSYTX W/PT 50 MIN: CPT | Mod: S$GLB,,, | Performed by: SOCIAL WORKER

## 2018-10-01 PROCEDURE — 92235 FLUORESCEIN ANGRPH MLTIFRAME: CPT | Mod: S$GLB,,, | Performed by: OPHTHALMOLOGY

## 2018-10-01 PROCEDURE — 99499 UNLISTED E&M SERVICE: CPT | Mod: S$GLB,,, | Performed by: OPHTHALMOLOGY

## 2018-10-01 PROCEDURE — 92250 FUNDUS PHOTOGRAPHY W/I&R: CPT | Mod: S$GLB,,, | Performed by: OPHTHALMOLOGY

## 2018-10-01 PROCEDURE — 90636 HEP A/HEP B VACC ADULT IM: CPT | Mod: S$GLB,,, | Performed by: INTERNAL MEDICINE

## 2018-10-01 SDOH — SOCIAL DETERMINANTS OF HEALTH (SDOH): OTHER SPECIFIED PROBLEMS RELATED TO PRIMARY SUPPORT GROUP: Z63.8

## 2018-10-01 NOTE — PATIENT INSTRUCTIONS
Fluorescein Angiography  Fluorescein angiography is an eye test. It is done to look at the back of your eye, including:  · The blood vessels in your eye  · The layer of tissue at the back of your eye (the retina)  · The center of your retina (the macula)  · The optic nerve  This test can diagnose diseases found in these areas. It can also diagnose other conditions that affect these areas. To do this test, a dye called fluorescein is shot (injected) into your arm. The dye goes into your bloodstream and up into the blood vessels in your eyes. A special camera is then used to take images (angiograms) of your eyes.     A fluorescein angiogram of the retina   Getting ready for your test  Tell your healthcare provider if you:  · Are pregnant or think you may be pregnant  · Are breastfeeding  · Have a history of severe allergic reactions, including to X-ray dye or other medicines  · Have kidney problems  Tell your provider about any medicines you are taking. You may need to stop taking all or some of these before the test. This includes:  · All prescription medicines  · Over-the-counter medicines such as aspirin or ibuprofen  · Street drugs  · Herbs, vitamins, and other supplements  You should arrange for an adult family member or friend to drive you home after your test. Your vision will be blurry for up to 12 hours.  Follow any other instructions from your healthcare provider.  During your test  · You are given eye drops to enlarge (dilate) your pupils.  · You then sit in front of a special camera. You place your chin on the chin rest and look into the camera.  · Images are taken of your eyes, one eye at a time.  · Fluorescein dye is then injected into your arm. The lights in the room are turned off. You may have mild nausea. You may have a warm feeling in your arm or upper body. Tell your provider if your skin feels itchy or if you are having trouble breathing. If so, you could be having an allergic reaction to the  dye.  · More pictures of your eyes are taken over 15 to 30 minutes. The camera shines a bright light into your eyes. Try to keep your head still and your eyes open.  · When enough images have been taken, the test is over.  After your test  Your vision will be blurry for up to 4 to 12 hours. This is because of your dilated pupils. Your eye will be more sensitive to light for up to 12 hours. You may want to wear sunglasses during this time. Do not drive if your vision is very blurry. You may also find it uncomfortable to read. Your skin may look yellow for a few hours. This is from the dye. Your urine will be bright yellow or orange for 24 to 48 hours after the test.     Risks and possible complications  All procedures have some risks. Possible risks of fluorescein angiography include:  · Upset stomach (nausea) and vomiting  · Leaking dye around the injection site that causes pain and swelling  · Metallic taste in your mouth  · Infection at injection site  · Allergic reaction to the dye  · Dry mouth or too much saliva  · Faster heart rate  · Sweating  · Lower back pain   © 0943-3656 [employer]. [address] [place] [address]. All rights reserved. This information is not intended as a substitute for professional medical care. Always follow your healthcare professional's instructions.

## 2018-10-01 NOTE — PROGRESS NOTES
Patient arrives for immunization injection of Hepatitis A & Hepatitis B (Recombinant) Vaccine/Twinrix - administered per order - patient understands need to remain on campus for at least 15 minutes and report any reaction to staff - left in no apparent distress

## 2018-10-01 NOTE — PROGRESS NOTES
HPI       DLS-9/10/18 dr ford    +no vision changes  -no pain or irritation  -no flashes or floaters    Visine PRN - hasnt used in a while    Ey hx:  DM W NP RETINOPATHY OU  CATARACT OU    IDDM  Hemoglobin A1C       Date                     Value               Ref Range             Status                08/01/2018               6.2 (H)             4.0 - 5.6 %           Final                      04/01/2018               5.7 (H)             4.0 - 5.6 %           Final                       02/09/2018              7.1 (H)             4.0 - 5.6 %         Final                   ----------          Last edited by Evon Teague on 10/1/2018  2:18 PM. (History)            Assessment /Plan     For exam results, see Encounter Report.    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

## 2018-10-01 NOTE — PROGRESS NOTES
HPI     Pt here for FA as planned  Va stable OU.  No f/f/pain OU    Visine PRN - hasnt used in a while    Ey hx:  DM W NP RETINOPATHY OU  CATARACT OU    IDDM  Hemoglobin A1C       Date                     Value               Ref Range             Status                08/01/2018               6.2 (H)             4.0 - 5.6 %           Final                      04/01/2018               5.7 (H)             4.0 - 5.6 %           Final                       02/09/2018              7.1 (H)             4.0 - 5.6 %         Final                   ----------          Last edited by Isael Mixon MD on 10/5/2018  4:53 PM. (History)        Fundus photos-good quality OU  OD: clear media, ONH s/p, vessels nl caliber, scattered IRH  OS: clear media, ONH s/p, vessels nl caliber, scattered IRH      FA:  OD: brisk, symmetric transit, numerous ma's with some peripheral cap dropout.  No NV  OS: numerous ma's with sig peripheral cap dropout.  No NV    FA OU about same as 5/31/17 scan  Fundus photos improved OS with fewer exudates      Assessment /Plan     For exam results, see Encounter Report.    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  Check refraction  Has OCT cystic changes OS.  Will observe  Pt has h/o cerebral vasculitis and left subthalamic stroke and JAYDEN         Recommend start PRP OS for ischemia, severe NPDR  OD with less ischemia.  Would observe for now

## 2018-10-01 NOTE — PROGRESS NOTES
"Family Psychotherapy (PhD/LCSW)    10/1/2018    Site: [facility]    Length of service: 50    Therapeutic intervention: [id number]-Family therapy with patient; needed because Bessy has neurocognitive disorder and needs support of family to be able to implement any changes/recommendations    Persons present: Bessy, wife Bekah, son Marky    Interval history:  Bessy arrived to session casually dressed and with adequate hygiene.  His facial hair has continued to appear overgrown and unkempt for the past few sessions, which is unusual.  He was oriented to person, place, and time but could not recall this SWs name.  Wife and son shared that he seems more forgetful and confused since stopping his Cytoxan infusion and starting Imuran.  They also report continued hypersomnia and apathetic.  Bessy denies feeling sad, depressed, or blue.  His wife and son remain frustrated and overwhelmed and continue to struggle with acceptance of Bessy's cognitive impairments and need for support and supervision.  They voiced concern to Bessy about his non-compliance with medications and becoming argumentative with Bkeah when she reminds him to take medications.  Bessy shared that he doesn't think the medications help because "they don't make me feel different/better".  SW reminded Bessy that many of his medications are meant to prevent strokes and keep his organs functioning properly and that there effects may not be noticeable to Bessy.  We used the analogy of car (since he owns a Corvette) and explained that the car will continue to look nice on the outside and feel comfortable to sit in, but without oil changes and other upkeep the internal parts won't function properly and eventually the car will stop working.  Bekah also shared that Bessy may have some difficulty swallowing the pills, though he did not agree or disagree with her comment.  On a positive note, Bekah is not connected with psychiatric services and     Family has not " follow up on calls to private duty caregivers, so JULIAN provided a list of agencies, again and encouraged them to follow through as this will provide some respite to Bekah and Marky.  Additionally, Bessy has seemed more willing to comply with requests made by non-relatives (PT, SW, etc), so he may be more likely to engage with an aide around medication compliance, taking walks, etc.      Target symptoms: recurrent depression, adjustment     Patient's interpersonal/verbal exchanges: [id number] -Family therapy with patient.  Bessy's insight and judgement are limited.  Family struggling to accept the permanence of Bessy's impairments and express anger and frustration toward Bessy for his apathy and non-compliance.    Progress toward goals: minimal progress    Diagnosis:  F32.9 Unspecified Depressive Disorder    F02.81 Major neurocognitive disorder due to another medical condition with behavioral disturbance    Z63.8 Family Conflict     Plan: family psychotherapy   JULIAN provided family with another list of caregiver agencies in their area.    Return to clinic: as needed, will call to schedule as family was in a hurry to get to another appointment.

## 2018-10-03 ENCOUNTER — HOSPITAL ENCOUNTER (OUTPATIENT)
Dept: RADIOLOGY | Facility: HOSPITAL | Age: 58
Discharge: HOME | End: 2018-10-03
Attending: CLINICAL NURSE SPECIALIST
Payer: COMMERCIAL

## 2018-10-03 DIAGNOSIS — I77.6 VASCULITIS, CNS: ICD-10-CM

## 2018-10-03 PROCEDURE — 70553 MRI BRAIN STEM W/O & W/DYE: CPT | Mod: 26,,, | Performed by: RADIOLOGY

## 2018-10-03 PROCEDURE — 70553 MRI BRAIN STEM W/O & W/DYE: CPT | Mod: TC

## 2018-10-03 PROCEDURE — A9585 GADOBUTROL INJECTION: HCPCS | Performed by: CLINICAL NURSE SPECIALIST

## 2018-10-03 PROCEDURE — 25500020 PHARM REV CODE 255: Performed by: CLINICAL NURSE SPECIALIST

## 2018-10-03 RX ORDER — GADOBUTROL 604.72 MG/ML
10 INJECTION INTRAVENOUS
Status: COMPLETED | OUTPATIENT
Start: 2018-10-03 | End: 2018-10-03

## 2018-10-03 RX ADMIN — GADOBUTROL 10 ML: 604.72 INJECTION INTRAVENOUS at 03:10

## 2018-10-15 ENCOUNTER — HOSPITAL ENCOUNTER (INPATIENT)
Facility: HOSPITAL | Age: 58
LOS: 4 days | Discharge: HOME HEALTH | DRG: 545 | End: 2018-10-20
Attending: EMERGENCY MEDICINE | Admitting: EMERGENCY MEDICINE
Payer: COMMERCIAL

## 2018-10-15 ENCOUNTER — TELEPHONE (OUTPATIENT)
Dept: NEUROLOGY | Facility: CLINIC | Age: 58
End: 2018-10-15

## 2018-10-15 DIAGNOSIS — R41.82 ALTERED MENTAL STATUS, UNSPECIFIED ALTERED MENTAL STATUS TYPE: ICD-10-CM

## 2018-10-15 DIAGNOSIS — I77.6 CNS VASCULITIS: Primary | ICD-10-CM

## 2018-10-15 DIAGNOSIS — G93.41 ACUTE METABOLIC ENCEPHALOPATHY: ICD-10-CM

## 2018-10-15 DIAGNOSIS — R00.0 TACHYCARDIA: ICD-10-CM
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
## 2018-10-15 DIAGNOSIS — F05 ACUTE CONFUSIONAL STATE: ICD-10-CM

## 2018-10-15 LAB
AMPHET+METHAMPHET UR QL: NEGATIVE
ANION GAP SERPL CALC-SCNC: 13 MMOL/L
BACTERIA #/AREA URNS AUTO: ABNORMAL /HPF
BARBITURATES UR QL SCN>200 NG/ML: NEGATIVE
BASOPHILS # BLD AUTO: 0.05 K/UL
BASOPHILS NFR BLD: 0.7 %
BENZODIAZ UR QL SCN>200 NG/ML: NORMAL
BILIRUB UR QL STRIP: NEGATIVE
BUN SERPL-MCNC: 15 MG/DL
BZE UR QL SCN: NEGATIVE
CALCIUM SERPL-MCNC: 10.2 MG/DL
CANNABINOIDS UR QL SCN: NEGATIVE
CHLORIDE SERPL-SCNC: 105 MMOL/L
CLARITY UR REFRACT.AUTO: ABNORMAL
CO2 SERPL-SCNC: 23 MMOL/L
COLOR UR AUTO: YELLOW
CREAT SERPL-MCNC: 0.9 MG/DL
CREAT UR-MCNC: 205 MG/DL
DIFFERENTIAL METHOD: ABNORMAL
EOSINOPHIL # BLD AUTO: 0.2 K/UL
EOSINOPHIL NFR BLD: 2.4 %
ERYTHROCYTE [DISTWIDTH] IN BLOOD BY AUTOMATED COUNT: 14.7 %
EST. GFR  (AFRICAN AMERICAN): >60 ML/MIN/1.73 M^2
EST. GFR  (NON AFRICAN AMERICAN): >60 ML/MIN/1.73 M^2
ESTIMATED AVG GLUCOSE: 131 MG/DL
ETHANOL UR-MCNC: <10 MG/DL
GLUCOSE SERPL-MCNC: 112 MG/DL
GLUCOSE UR QL STRIP: NEGATIVE
HBA1C MFR BLD HPLC: 6.2 %
HCT VFR BLD AUTO: 35.9 %
HGB BLD-MCNC: 12.6 G/DL
HGB UR QL STRIP: ABNORMAL
HYALINE CASTS UR QL AUTO: 9 /LPF
IMM GRANULOCYTES # BLD AUTO: 0.02 K/UL
IMM GRANULOCYTES NFR BLD AUTO: 0.3 %
KETONES UR QL STRIP: NEGATIVE
LEUKOCYTE ESTERASE UR QL STRIP: NEGATIVE
LYMPHOCYTES # BLD AUTO: 1.4 K/UL
LYMPHOCYTES NFR BLD: 19.5 %
MCH RBC QN AUTO: 29.8 PG
MCHC RBC AUTO-ENTMCNC: 35.1 G/DL
MCV RBC AUTO: 85 FL
METHADONE UR QL SCN>300 NG/ML: NEGATIVE
MICROSCOPIC COMMENT: ABNORMAL
MONOCYTES # BLD AUTO: 0.7 K/UL
MONOCYTES NFR BLD: 9.9 %
NEUTROPHILS # BLD AUTO: 4.7 K/UL
NEUTROPHILS NFR BLD: 67.2 %
NITRITE UR QL STRIP: NEGATIVE
NRBC BLD-RTO: 0 /100 WBC
OPIATES UR QL SCN: NEGATIVE
PCP UR QL SCN>25 NG/ML: NEGATIVE
PH UR STRIP: 5 [PH] (ref 5–8)
PLATELET # BLD AUTO: 214 K/UL
PMV BLD AUTO: 9.3 FL
POCT GLUCOSE: 127 MG/DL (ref 70–110)
POCT GLUCOSE: 134 MG/DL (ref 70–110)
POCT GLUCOSE: 167 MG/DL (ref 70–110)
POTASSIUM SERPL-SCNC: 3.2 MMOL/L
PROCALCITONIN SERPL IA-MCNC: 0.13 NG/ML
PROT UR QL STRIP: ABNORMAL
RBC # BLD AUTO: 4.23 M/UL
RBC #/AREA URNS AUTO: 14 /HPF (ref 0–4)
SODIUM SERPL-SCNC: 141 MMOL/L
SP GR UR STRIP: 1.02 (ref 1–1.03)
TOXICOLOGY INFORMATION: NORMAL
TSH SERPL DL<=0.005 MIU/L-ACNC: 2.46 UIU/ML
URN SPEC COLLECT METH UR: ABNORMAL
UROBILINOGEN UR STRIP-ACNC: NEGATIVE EU/DL
VIT B12 SERPL-MCNC: 251 PG/ML
WBC # BLD AUTO: 6.99 K/UL
WBC #/AREA URNS AUTO: 1 /HPF (ref 0–5)

## 2018-10-15 PROCEDURE — 84145 PROCALCITONIN (PCT): CPT

## 2018-10-15 PROCEDURE — 25000003 PHARM REV CODE 250: Performed by: HOSPITALIST

## 2018-10-15 PROCEDURE — 99285 EMERGENCY DEPT VISIT HI MDM: CPT | Mod: 25

## 2018-10-15 PROCEDURE — 99219 PR INITIAL OBSERVATION CARE,LEVL II: CPT | Mod: ,,, | Performed by: HOSPITALIST

## 2018-10-15 PROCEDURE — 99223 1ST HOSP IP/OBS HIGH 75: CPT | Mod: ,,, | Performed by: PSYCHIATRY & NEUROLOGY

## 2018-10-15 PROCEDURE — 82607 VITAMIN B-12: CPT

## 2018-10-15 PROCEDURE — 99285 EMERGENCY DEPT VISIT HI MDM: CPT | Mod: ,,, | Performed by: EMERGENCY MEDICINE

## 2018-10-15 PROCEDURE — 81001 URINALYSIS AUTO W/SCOPE: CPT

## 2018-10-15 PROCEDURE — G0378 HOSPITAL OBSERVATION PER HR: HCPCS

## 2018-10-15 PROCEDURE — 63600175 PHARM REV CODE 636 W HCPCS: Performed by: HOSPITALIST

## 2018-10-15 PROCEDURE — 84443 ASSAY THYROID STIM HORMONE: CPT

## 2018-10-15 PROCEDURE — 86592 SYPHILIS TEST NON-TREP QUAL: CPT

## 2018-10-15 PROCEDURE — 93010 ELECTROCARDIOGRAM REPORT: CPT | Mod: ,,, | Performed by: INTERNAL MEDICINE

## 2018-10-15 PROCEDURE — 83036 HEMOGLOBIN GLYCOSYLATED A1C: CPT

## 2018-10-15 PROCEDURE — 80048 BASIC METABOLIC PNL TOTAL CA: CPT

## 2018-10-15 PROCEDURE — 25000003 PHARM REV CODE 250: Performed by: STUDENT IN AN ORGANIZED HEALTH CARE EDUCATION/TRAINING PROGRAM

## 2018-10-15 PROCEDURE — 80307 DRUG TEST PRSMV CHEM ANLYZR: CPT

## 2018-10-15 PROCEDURE — 85025 COMPLETE CBC W/AUTO DIFF WBC: CPT

## 2018-10-15 RX ORDER — VENLAFAXINE HYDROCHLORIDE 150 MG/1
150 CAPSULE, EXTENDED RELEASE ORAL DAILY
Status: DISCONTINUED | OUTPATIENT
Start: 2018-10-15 | End: 2018-10-15

## 2018-10-15 RX ORDER — IBUPROFEN 200 MG
16 TABLET ORAL
Status: DISCONTINUED | OUTPATIENT
Start: 2018-10-15 | End: 2018-10-20 | Stop reason: HOSPADM

## 2018-10-15 RX ORDER — ONDANSETRON 8 MG/1
8 TABLET, ORALLY DISINTEGRATING ORAL EVERY 8 HOURS PRN
Status: DISCONTINUED | OUTPATIENT
Start: 2018-10-15 | End: 2018-10-20 | Stop reason: HOSPADM

## 2018-10-15 RX ORDER — SODIUM CHLORIDE 0.9 % (FLUSH) 0.9 %
5 SYRINGE (ML) INJECTION
Status: DISCONTINUED | OUTPATIENT
Start: 2018-10-15 | End: 2018-10-20 | Stop reason: HOSPADM

## 2018-10-15 RX ORDER — POTASSIUM CHLORIDE 20 MEQ/15ML
40 SOLUTION ORAL ONCE
Status: COMPLETED | OUTPATIENT
Start: 2018-10-15 | End: 2018-10-15

## 2018-10-15 RX ORDER — IBUPROFEN 200 MG
24 TABLET ORAL
Status: DISCONTINUED | OUTPATIENT
Start: 2018-10-15 | End: 2018-10-20 | Stop reason: HOSPADM

## 2018-10-15 RX ORDER — CHOLECALCIFEROL (VITAMIN D3) 25 MCG
5000 TABLET ORAL DAILY
Status: DISCONTINUED | OUTPATIENT
Start: 2018-10-15 | End: 2018-10-20 | Stop reason: HOSPADM

## 2018-10-15 RX ORDER — POTASSIUM CHLORIDE 20 MEQ/15ML
40 SOLUTION ORAL
Status: COMPLETED | OUTPATIENT
Start: 2018-10-15 | End: 2018-10-15

## 2018-10-15 RX ORDER — ATENOLOL 25 MG/1
50 TABLET ORAL DAILY
Status: DISCONTINUED | OUTPATIENT
Start: 2018-10-15 | End: 2018-10-20 | Stop reason: HOSPADM

## 2018-10-15 RX ORDER — IRBESARTAN 150 MG/1
300 TABLET ORAL NIGHTLY
Status: DISCONTINUED | OUTPATIENT
Start: 2018-10-15 | End: 2018-10-20 | Stop reason: HOSPADM

## 2018-10-15 RX ORDER — GLUCAGON 1 MG
1 KIT INJECTION
Status: DISCONTINUED | OUTPATIENT
Start: 2018-10-15 | End: 2018-10-20 | Stop reason: HOSPADM

## 2018-10-15 RX ORDER — INSULIN ASPART 100 [IU]/ML
10 INJECTION, SOLUTION INTRAVENOUS; SUBCUTANEOUS
Status: DISCONTINUED | OUTPATIENT
Start: 2018-10-15 | End: 2018-10-17

## 2018-10-15 RX ORDER — DILTIAZEM HYDROCHLORIDE 120 MG/1
240 CAPSULE, COATED, EXTENDED RELEASE ORAL DAILY
Status: DISCONTINUED | OUTPATIENT
Start: 2018-10-15 | End: 2018-10-20 | Stop reason: HOSPADM

## 2018-10-15 RX ORDER — AZATHIOPRINE 50 MG/1
150 TABLET ORAL DAILY
Status: DISCONTINUED | OUTPATIENT
Start: 2018-10-15 | End: 2018-10-20

## 2018-10-15 RX ORDER — ACETAMINOPHEN 325 MG/1
650 TABLET ORAL EVERY 4 HOURS PRN
Status: DISCONTINUED | OUTPATIENT
Start: 2018-10-15 | End: 2018-10-20 | Stop reason: HOSPADM

## 2018-10-15 RX ORDER — INSULIN ASPART 100 [IU]/ML
1-10 INJECTION, SOLUTION INTRAVENOUS; SUBCUTANEOUS
Status: DISCONTINUED | OUTPATIENT
Start: 2018-10-15 | End: 2018-10-17

## 2018-10-15 RX ORDER — ATORVASTATIN CALCIUM 20 MG/1
40 TABLET, FILM COATED ORAL DAILY
Status: DISCONTINUED | OUTPATIENT
Start: 2018-10-15 | End: 2018-10-20 | Stop reason: HOSPADM

## 2018-10-15 RX ORDER — CALCIUM CARBONATE 500(1250)
1000 TABLET ORAL ONCE
Status: COMPLETED | OUTPATIENT
Start: 2018-10-15 | End: 2018-10-15

## 2018-10-15 RX ORDER — ASPIRIN 81 MG/1
81 TABLET ORAL DAILY
Status: DISCONTINUED | OUTPATIENT
Start: 2018-10-15 | End: 2018-10-20 | Stop reason: HOSPADM

## 2018-10-15 RX ADMIN — DILTIAZEM HYDROCHLORIDE 240 MG: 240 CAPSULE, EXTENDED RELEASE ORAL at 02:10

## 2018-10-15 RX ADMIN — ATENOLOL 50 MG: 25 TABLET ORAL at 04:10

## 2018-10-15 RX ADMIN — INSULIN ASPART 10 UNITS: 100 INJECTION, SOLUTION INTRAVENOUS; SUBCUTANEOUS at 06:10

## 2018-10-15 RX ADMIN — DEXTROSE: 50 INJECTION, SOLUTION INTRAVENOUS at 07:10

## 2018-10-15 RX ADMIN — VENLAFAXINE HYDROCHLORIDE 150 MG: 75 CAPSULE, EXTENDED RELEASE ORAL at 04:10

## 2018-10-15 RX ADMIN — AZATHIOPRINE 150 MG: 50 TABLET ORAL at 04:10

## 2018-10-15 RX ADMIN — CALCIUM 1000 MG: 500 TABLET ORAL at 04:10

## 2018-10-15 RX ADMIN — VITAMIN D, TAB 1000IU (100/BT) 5000 UNITS: 25 TAB at 04:10

## 2018-10-15 RX ADMIN — ATORVASTATIN CALCIUM 40 MG: 20 TABLET, FILM COATED ORAL at 04:10

## 2018-10-15 RX ADMIN — POTASSIUM CHLORIDE 40 MEQ: 20 SOLUTION ORAL at 07:10

## 2018-10-15 RX ADMIN — POTASSIUM CHLORIDE 40 MEQ: 20 SOLUTION ORAL at 10:10

## 2018-10-15 RX ADMIN — ASPIRIN 81 MG: 81 TABLET, COATED ORAL at 04:10

## 2018-10-15 RX ADMIN — INSULIN DETEMIR 20 UNITS: 100 INJECTION, SOLUTION SUBCUTANEOUS at 09:10

## 2018-10-15 RX ADMIN — IRBESARTAN 300 MG: 150 TABLET ORAL at 09:10

## 2018-10-15 NOTE — TELEPHONE ENCOUNTER
Bessy is in the ER currently after experiencing a fall at 3am. His wife reports that he has been very confused lately, is unable to do things like button his own shirt, and sleeps for 23 hours a day, waking only to eat meals. We will fit him in on Thursday to discuss plan for continued Cytoxan. Dr. Love is aware and will join us in this visit.

## 2018-10-15 NOTE — ED NOTES
Pt resting on stretcher in NAD, respirations even and unlabored. Stretcher locked and in lowest position, side rails x 2, call bell within reach. Cardiac monitor, pulse ox and BP cuff applied cycling Q 30 minute vitals. Pt offered restroom assistance, pt states no needs at this time. Pt wife at the bedside, updated on wait for EEG monitoring. Will continue to monitor and update pt on plan of care.

## 2018-10-15 NOTE — ED TRIAGE NOTES
Bessy Nunez, a 58 y.o. male presents to the ED after having fall at home approx. 3 hours ago. Pt got up to go to the restroom and experienced some dizziness. Pt reports being at sons house and was unfamiliar with layout and thinks this may have caused fall. Pt reports hitting head and right arm on dresser. Denies LOC. Takes 81 mg Aspirin. Pt reports increasing dizziness since fall. Wife reports shuffling gait and recent falls - about 1 fall weekly. Wife also states that he began new medication (unknown what) about 2 weeks ago. She also reports increasing confusion x 1 week. States that he has intermittent episodes of confusion where he forgets daily tasks.     Triage note:  Chief Complaint   Patient presents with    Head Injury     C/o losing balance and hitting head on dresser when trying to go to bathroom. Denies LOC. No blood thinners. Hx CNS vasculitis. Wife endorses pt has been having worsening confusion x 1 week.     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 Love By mri.  Several active lesions, many old. 5/13: MRA brain/neck, MRI brain w/wo contrast show no vascular occlusion, multiple foci of hyperintensity in deep cerebral periventricular white matter in pattern of demyelinating process.  5/17: MRI spine performed, no spinal cord lesions. Hospitalization 6/2017. EEG was performed negative for seizures.  Repeat MRI showing new lesion, question whether this was demyelination versus CVA. 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 Findings compatible with a small acute lacunar infarct adjacent to the left frontal horn.  Nonspecific enhancement just inferior  to this region and extending into the left basal ganglia, as well as within the inferior aspect of the left cerebellum.  No evidence of a focal mass. 2.  Extensive increased signal intensity involving the periventricular white matter compatible with demyelinating disease versus vasculitis. 3.  Clinical correlation and followup recommended. 4.  This reportedly flattened in the EPIC and the [place] callos[place] medical record system.    Mixed hyperlipidemia 11/9/2012    NAFLD (nonalcoholic fatty liver disease) 10/11/2013    CHASE (nonalcoholic steatohepatitis)     Obesity     Stroke     Type 2 diabetes mellitus with diabetic polyneuropathy, with long-term current use of insulin 5/14/2017    Type 2 diabetes mellitus with hyperglycemia, with long-term current use of insulin 10/11/2013        Adult Physical Assessment  LOC: Bessy Nunez, 58 y.o. male verified via two identifiers.  The patient is awake, alert, oriented and speaking appropriately at this time.  APPEARANCE: Patient resting comfortably and appears to be in no acute distress at this time. Patient is clean and well groomed, patient's clothing is properly fastened.  SKIN:The skin is warm and dry, color consistent with ethnicity, patient has normal skin turgor and moist mucus membranes, skin intact, no breakdown noted.  MUSCULOSKELETAL: Patient moving all extremities well, no obvious swelling or deformities noted.Redness noted to back of head and right upper arm.  RESPIRATORY: Airway is open and patent, respirations are spontaneous, patient has a normal effort and rate, no accessory muscle use noted.  CARDIAC: Patient has a normal rate and rhythm, no periphreal edema noted in any extremity, capillary refill < 3 seconds in all extremities  ABDOMEN: Soft and non tender to palpation, no abdominal distention noted. Bowel sounds present in all four quadrants.  NEUROLOGIC: Eyes open spontaneously, behavior appropriate to situation, follows commands, facial  expression symmetrical, bilateral hand grasp equal and even, purposeful motor response noted, normal sensation in all extremities when touched with a finger.

## 2018-10-15 NOTE — ED NOTES
Assumed patient care. Pt resting on stretcher in NAD, respirations even and unlabored. Stretcher locked and in lowest position, side rails x 2, wife at the bedside. BP cuff, cardiac monitor and pulse ox applied vitals cycling Q 30 minutes. Pt offered restroom assistance, states no needs at this time. Will continue to monitor and update pt on plan of care.

## 2018-10-15 NOTE — ASSESSMENT & PLAN NOTE
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 Has been on Imuran since the past 2-3 weeks. Per the wife, has had some personality changes with increased lethargy, confusion since switching over to Imuran. MRI brain done on 9/14 shows supratentorial and infratentorial white matter changes with superimposed infarcts unchanged from prior imaging on 4/2.    - On exam, he is A&O x3, recal is 1/3+0 with prompting, Luria intact, unable to do serial 7s or count the months backwards. No motor/sensory deficits. DTRS 1+ in the lower extremities. Vibration and proprioception intact. No cerebellar signs.  - CT head shows patchy and confluent hypoattenuation in the deep cerebral and periventricular white matter in keeping with chronic microvascular ischemic change with superimposed remote lacunar type infarcts    Assessment and Recommendations:  - Does not appear lethargic on exam. Does have memory deficits which based on prior notes is unchanged.  - Continue Imuran 150mg daily  - Pulse steroids for 3 days.  - Will touch base with Dr Love for any additional recs.

## 2018-10-15 NOTE — CONSULTS
[facility]  Neurology  Consult Note    Patient Name: Bessy Nunez  MRN: [id number]  Admission Date: 10/15/2018  Hospital Length of Stay: 0 days  Code Status: Full Code   Attending Provider: Teresita Horton MD   Consulting Provider: Abe Kwok MD  Primary Care Physician: Edgar Nova MD  Principal Problem:<principal problem not specified>    Inpatient consult to neurology  Consult performed by: Abe Kwok MD  Consult ordered by: Teresita Horton MD         Subjective:     Chief Complaint:  Lethargy and confusion    HPI:   58 yr old male with PMH of CNS vasculitis, T2DM, CVA, who presented with increased lethargy since the past 2-3 weeks. Wife reports that patient was recently started on Imuran for CNS vasculitis. Since then he has apparently been more lethargic, sleeps for most of the day and constantly forgets things. He would forget how to use a remote or a key. He would frequently forget names and objects. 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 He has received 12 monthly infusions of cyclophosphamide with his last dose on 7/11. While on the cyclophosphamide, he was doing well; he would perk up right after the infusions and would decline in the week prior to his next dose. While on the Cytoxan, he was still having intermittent confusion and occasional memory loss. He also had multiple falls in the past few months. No LOC/urinary incontinence but has sustained rib fractures and vertebral fractures from the fall.  Follows up with Dr. Mixon for visual changes. Per his last note on 10/5, vision has been improving.  No h/o seizures.  Per Dr. Love's last clinic note:   - Slowly improving, continues to receive PT and speech therapy   - Changed zoloft to effexor last week, with no clear changes yet.  - Difficult to get him to participate in ADLs - have to force him to bathe, brush teeth, shave. Requires constant reminders              - they feel that if they  weren't around he would just lie around all day  - He's still very irritable and stubborn  - Have tried to get home health (nursing, diabetes management) but have been unable to get it  - Tolerating cytoxan well - his wife reports that for a few days after each treatment he has a drastic improvement   - 2 falls recently - January he fell and hit his head, broke 4 ribs; November/December fell and broke vertebrae       Past Medical History:   Diagnosis Date    Amblyopia     Cataract     CNS vasculitis 6/2/2017    Follows with Dr. Love By mri.  Several active lesions, many old. 5/13: MRA brain/neck, MRI brain w/wo contrast show no vascular occlusion, multiple foci of hyperintensity in deep cerebral periventricular white matter in pattern of demyelinating process.  5/17: MRI spine performed, no spinal cord lesions. Hospitalization 6/2017. EEG was performed negative for seizures.  Repeat MRI showing new lesion, question whether this was demyelination versus CVA. 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 Findings compatible with a small acute lacunar infarct adjacent to the left frontal horn.  Nonspecific enhancement just inferior to this region and extending into the left basal ganglia, as well as within the inferior aspect of the left cerebellum.  No evidence of a focal mass. 2.  Extensive increased signal intensity involving the periventricular white matter compatible with demyelinating disease versus vasculitis. 3.  Clinical correlation and followup recommended. 4.  This reportedly flattened in the EPIC and the corpus callosum medical record system.    Mixed hyperlipidemia 11/9/2012    NAFLD (nonalcoholic fatty liver disease) 10/11/2013    CHSAE (nonalcoholic steatohepatitis)     Obesity      Stroke     Type 2 diabetes mellitus with diabetic polyneuropathy, with long-term current use of insulin 5/14/2017    Type 2 diabetes mellitus with hyperglycemia, with long-term current use of insulin 10/11/2013       Past Surgical History:   Procedure Laterality Date    BIOPSY LIVER AND ULTRASOUND N/A 6/9/2015    Performed by [facility] OR 2ND FLR    COLONOSCOPY N/A 2/21/2014    Performed by [facility] at [facility] ENDO    ESOPHAGOGASTRODUODENOSCOPY (EGD) N/A 5/29/2017    Performed by Hai Carter MD at [facility] ENDO (2ND FLR)    SKIN CANCER EXCISION         Review of patient's allergies indicates:  No Known Allergies      No current facility-administered medications on file prior to encounter.      Current Outpatient Medications on File Prior to Encounter   Medication Sig    alclomethasone (ACLOVATE) 0.05 % cream     aspirin (ECOTRIN) 81 MG EC tablet Take 81 mg by mouth once daily.    atenolol (TENORMIN) 50 MG tablet Take 1 tablet (50 mg total) by mouth once daily.    atorvastatin (LIPITOR) 40 MG tablet Take 1 tablet (40 mg total) by mouth once daily.    azaTHIOprine (IMURAN) 50 mg Tab Take 50mg (1 tab ) daily x5 days, then 100mg (2 tabs) daily x5 days, then 150mg daily (3 tabs thereafter)    blood sugar diagnostic (TRUE METRIX GLUCOSE TEST STRIP) Strp Test blood sugar four times a day    blood sugar diagnostic Strp To check BG 4 times daily, to use with insurance preferred meter    calcium carbonate (OS-DONNA) 500 mg calcium (1,250 mg) tablet Take 2 tablets (1,000 mg total) by mouth once.    diltiaZEM (DILACOR XR) 240 MG CDCR Take 1 capsule (240 mg total) by mouth once daily.    insulin aspart U-100 (NOVOLOG) 100 unit/mL InPn pen Inject 20 Units into the skin 3 (three) times daily with meals.    insulin glargine (BASAGLAR KWIKPEN U-100 INSULIN) 100 unit/mL (3 mL) InPn pen Inject 40 Units into the skin 2 (two) times daily.    insulin syringe-needle U-100 (INSULIN SYRINGE) 1/2  "mL 30 gauge x 5/16 Syrg Use 3x/day    insulin syringe-needle U-100 0.5 mL 31 gauge x 5/16 Syrg     irbesartan (AVAPRO) 300 MG tablet Take 1 tablet (300 mg total) by mouth every evening.    ketoconazole (NIZORAL) 2 % cream     lancets 30 gauge Misc Test blood sugar four times a day    lancets Misc To check BG 4 times daily, one touch verio meter. Dx code e11.65    liraglutide 0.6 mg/0.1 mL, 18 mg/3 mL, subq PNIJ (VICTOZA 3-TOMASZ) 0.6 mg/0.1 mL (18 mg/3 mL) PnIj Inject 1.8 mg into the skin once daily.    metFORMIN (GLUCOPHAGE-XR) 500 MG 24 hr tablet Take 2 tablets (1,000 mg total) by mouth 2 (two) times daily with meals.    NOVOFINE PLUS 32 gauge x 1/6" Ndle     omega-3 fatty acids-vitamin E (FISH OIL) 1,000 mg Cap Take 1 capsule by mouth 2 (two) times daily.    ONETOUCH DELICA LANCETS 33 gauge Misc     pen needle, diabetic (BD ULTRA-FINE ANA PEN NEEDLE) 32 gauge x 5/32" Ndle Use one needle twice daily    pen needle, diabetic (BD ULTRA-FINE ANA PEN NEEDLE) 32 gauge x 5/32" Ndle 4 times daily insulin administration    venlafaxine (EFFEXOR-XR) 75 MG 24 hr capsule Take 2 capsules (150 mg total) by mouth once daily.    vitamin D 1000 units Tab Take 5 tablets (5,000 Units total) by mouth once daily.    blood-glucose meter kit To check BG 4 times daily, one touch verio meter. Dx code e11.65     Family History     Problem Relation (Age of Onset)    Cancer Father    Cataracts Mother    Diabetes Maternal Aunt    Heart disease Mother    Heart failure Mother    Hypertension Mother    Rheum arthritis Paternal Grandmother    Stroke Sister        Tobacco Use    Smoking status: Never Smoker    Smokeless tobacco: Never Used   Substance and Sexual Activity    Alcohol use: No     Alcohol/week: 0.0 oz    Drug use: No    Sexual activity: Not on file     Review of Systems   Constitutional: Negative for chills and fever.   Cardiovascular: Negative for chest pain.   Gastrointestinal: Negative for abdominal distention. "   Genitourinary: Negative for difficulty urinating.   Musculoskeletal: Negative for arthralgias.   Neurological: Negative for dizziness, seizures, weakness, numbness and headaches.   Psychiatric/Behavioral: Negative for agitation.     Objective:     Vital Signs (Most Recent):  Temp: 98 °F (36.7 °C) (10/15/18 1300)  Pulse: 99 (10/15/18 1300)  Resp: 18 (10/15/18 1300)  BP: (!) 162/90 (10/15/18 1300)  SpO2: 98 % (10/15/18 1300) Vital Signs (24h Range):  Temp:  [98 °F (36.7 °C)-99 °F (37.2 °C)] 98 °F (36.7 °C)  Pulse:  [] 99  Resp:  [18-20] 18  SpO2:  [95 %-99 %] 98 %  BP: (134-183)/() 162/90     Weight: 99.8 kg (220 lb)  Body mass index is 31.57 kg/m².    Physical Exam   Constitutional: He is oriented to person, place, and time. No distress.   Eyes: EOM are normal. Pupils are equal, round, and reactive to light.   Cardiovascular: Normal rate.   Pulmonary/Chest: Effort normal.   Neurological: He is alert and oriented to person, place, and time. He has a normal Finger-Nose-Finger Test.   Reflex Scores:       Bicep reflexes are 2+ on the right side and 2+ on the left side.       Brachioradialis reflexes are 2+ on the right side and 2+ on the left side.       Patellar reflexes are 2+ on the right side and 2+ on the left side.       Achilles reflexes are 1+ on the right side and 1+ on the left side.  Psychiatric: His speech is normal.   Nursing note and vitals reviewed.      NEUROLOGICAL EXAMINATION:     MENTAL STATUS   Oriented to person, place, and time.   Oriented to person.   Oriented to place.   Oriented to time.   Registration: recalls 3 of 3 objects. Recall at 5 minutes: recalls 1 of 3 objects. Follows 2 step commands.   Attention: decreased. Concentration: decreased.   Speech: speech is normal   Level of consciousness: alert  Knowledge: good.     CRANIAL NERVES     CN III, IV, VI   Pupils are equal, round, and reactive to light.  Extraocular motions are normal.     CN V   Facial sensation intact.     CN  VII   Facial expression full, symmetric.     CN VIII   CN VIII normal.     CN IX, X   CN IX normal.     CN XI   CN XI normal.     CN XII   CN XII normal.     MOTOR EXAM   Muscle bulk: normal  Overall muscle tone: normal  Right arm pronator drift: absent  Left arm pronator drift: absent    Strength   Right deltoid: 5/5  Left deltoid: 5/5  Right biceps: 5/5  Left biceps: 5/5  Right quadriceps: 5/5  Left quadriceps: 5/5  Right peroneal: 5/5  Left peroneal: 5/5  Right gastroc: 5/5  Left gastroc: 5/5    REFLEXES     Reflexes   Right brachioradialis: 2+  Left brachioradialis: 2+  Right biceps: 2+  Left biceps: 2+  Right patellar: 2+  Left patellar: 2+  Right achilles: 1+  Left achilles: 1+  Right plantar: normal  Left plantar: normal  Right ankle clonus: absent  Left ankle clonus: absent    SENSORY EXAM   Light touch normal.     GAIT AND COORDINATION      Coordination   Finger to nose coordination: normal    Tremor   Resting tremor: absent      Significant Labs:   Recent Lab Results       10/15/18  0638      Immature Granulocytes 0.3     Immature Grans (Abs) 0.02  Comment:  Mild elevation in immature granulocytes is non specific and   can be seen in a variety of conditions including stress response,   acute inflammation, trauma and pregnancy. Correlation with other   laboratory and clinical findings is essential.       Anion Gap 13     Baso # 0.05     Basophil% 0.7     BUN, Bld 15     Calcium 10.2     Chloride 105     CO2 23     Creatinine 0.9     Differential Method Automated     eGFR if African American >60.0     eGFR if non  >60.0  Comment:  Calculation used to obtain the estimated glomerular filtration  rate (eGFR) is the CKD-EPI equation.        Eos # 0.2     Eosinophil% 2.4     Glucose 112     Gran # (ANC) 4.7     Gran% 67.2     Hematocrit 35.9     Hemoglobin 12.6     Lymph # 1.4     Lymph% 19.5     MCH 29.8     MCHC 35.1     MCV 85     Mono # 0.7     Mono% 9.9     MPV 9.3     nRBC 0     Platelets  214     Potassium 3.2     RBC 4.23     RDW 14.7     Sodium 141     WBC 6.99         All pertinent lab results from the past 24 hours have been reviewed.    Significant Imaging: I have reviewed all pertinent imaging results/findings within the past 24 hours.    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 Has been on Imuran since the past 2-3 weeks. Per the wife, has had some personality changes with increased lethargy, confusion since switching over to Imuran. MRI brain done on 9/14 shows supratentorial and infratentorial white matter changes with superimposed infarcts unchanged from prior imaging on 4/2.    - On exam, he is A&O x3, recal is 1/3+0 with prompting, Luria intact, unable to do serial 7s or count the months backwards. No motor/sensory deficits. DTRS 1+ in the lower extremities. Vibration and proprioception intact. No cerebellar signs.  - CT head shows patchy and confluent hypoattenuation in the deep cerebral and periventricular white matter in keeping with chronic microvascular ischemic change with superimposed remote lacunar type infarcts    Assessment and Recommendations:  - Does not appear lethargic on exam. Does have memory deficits which based on prior notes is unchanged.  - Continue Imuran 150mg daily  - EEG  - Pulse steroids for 3 days.  - Will touch base with Dr Love for any additional recs.            VTE Risk Mitigation (From admission, onward)        Ordered     Place sequential compression device  Until discontinued      10/15/18 1344     Place ELIZABETH hose  Until discontinued      10/15/18 1344     IP VTE HIGH RISK PATIENT  Once      10/15/18 1344          Thank you for your consult. I will follow-up with patient. Please contact us if you have any additional questions.    Abe Kwok MD  Neurology  [facility]

## 2018-10-15 NOTE — H&P
Hospital Medicine  History and Physical Exam     Team: [facility]OSP MED R Teresita Horton MD  Admit Date: 10/15/2018  [place]EL[place]   Principal Problem:  <principal problem not specified>   Patient information was obtained from patient, spouse/SO and ER records.   Primary Care Physician: Edgar Nova MD  Code status: Full Code    HPI: Bessy Nunez is a 58 y.o. male with PMH of CNS vasculitis on azathioprine, immunosuppressed status, DM2, Hx of CVA (lacunar infarct of left subthalamic region), HTN, HLD, NAFLD who present to [facility] today after a fall. Pt reports he woke up around 3am to use the bathroom, and suffered a mechanical fall. He did not lose consciousness but reports hitting his head and right shoulder. Pt is noted to be a poor historian and hx of obtained from wife at bedside. Per wife, pt used to be veyr high functioning but about 1 year ago was diagnosed with CNS vasculitis after he started to develop confusion, short term memory deficits, gait abnormalities, instability, imbalance. He was being treated with cyclophosphamide Per neurologist Dr. Love and all his symptoms resolved. He was switched to azathioprine about three weeks ago and pt started experience all his symptoms again. Over the last 3 weeks, pt has become increasingly confused and had cognitive difficulties. HE has difficulty answering simple questions or operating the iphone, computer, remote control. He has become more forgetful. Pt has developed a shuffling gait where he doesn't lift his legs off the ground when he ambulates. He has been unsteady on his feet and suffered multiple falls / near falls. Wife has also noticed some changes in his personality where he is much quieter now. Per wife, pt sleeps about 23 hrs a day. Pt denies any head aches but wife reprots he asks for advil and grabs his head often. Wife reports he gets double vision and dizziness from time to time. He complains of BLE numbness and tingling that is chronic from DM and unchanged  today. Pt has intermittent episodes of dizziness and diplopia. Pt had diabetic retinopathy and has weak eye sight in right eye but his vision has been unchanged over the last 3 weeks. He has also had changes in hearing and has to get people to repeat things multiple times. He has also had urinary incontinence and increased urinary frequency. His appetite in the last 3 weeks has been much lower and has had decreasing PO intake. He denies nausea, vomiting, photophobia, phobophobia, sensitivity to smells, excessive lacrimation, rhinorrhea, fever, chills, malaise.    While in the ED, pt is hemodynamically stable, afebrile. He is noted to have hypokalemia and mild anemia. CT head does not reveal any acute pathology. Redemonstration of patchy and confluent hypoattenuation in the deep cerebral and periventricular white matter in keeping with chronic microvascular ischemic change with superimposed remote lacunar type infarcts. Pt was admitted for worsening complaints of CNS vasculitis. Neurology was consulted.       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    Past Surgical History: Patient has a past surgical history that includes Skin cancer excision; ESOPHAGOGASTRODUODENOSCOPY (EGD) (N/A, 5/29/2017); BIOPSY LIVER AND ULTRASOUND (N/A, 6/9/2015); and COLONOSCOPY (N/A,  2/21/2014).    Social History: Patient reports that  has never smoked. he has never used smokeless tobacco. He reports that he does not drink alcohol or use drugs.    Family History: family history includes Cancer in his father; Cataracts in his mother; Diabetes in his maternal aunt; Heart disease in his mother; Heart failure in his mother; Hypertension in his mother.    Medications: reviewed     Allergies: Patient has No Known Allergies.    Review of Systems: (BOLDED POSITIVE) (REMAINDER NEGATIVE)     General: fever, chills, night sweats, weakness, fatigue    Eyes/Head: vision changes, headache, dizziness   ENT: tinnitus, epistaxis, dysphagia  Respiratory: SOB, cough, wheezing, sputum, hemoptysis   CVS: chest pain, edema, syncope, palpitations, VALENTIN  GI:nausea, vomiting, diarrhea, constipation, abdominal pain  : dysuria, hematuria, nocturia, incontinence  Neurological: headace, weakness, slurred speech, numbness/tingling   Heme/Lymph: anemia, easy bruising, swollen glands    MSK:arthralgia, gout, back pain, stiffness     Psychiatric: insomnia, stress, depression, anxiety, SI, HI         Physical Exam:     Temp:  [98.2 °F (36.8 °C)-99 °F (37.2 °C)]   Pulse:  []   Resp:  [18-20]   BP: (134-183)/()   SpO2:  [95 %-99 %]   Body mass index is 31.57 kg/m².   No intake or output data in the 24 hours ending 10/15/18 1245       General appearance: NAD, cooperative, alert, appears stated age    Head: Normocephalic, without obvious abnormality, atraumatic     Eyes: conjunctivae/corneas clear. PERRLA, EOM's intact     Throat: Lips, mucosa, and tongue moist and without lesion     Neck: supple, trachea midline, no adenopathy     Lungs: clear to auscultation bilaterally, no wheezes, no crackles, no rales, no rhonchi   Heart: regular rate and rhythm, S1, S2 normal, no murmur, click, rub or gallop    Abdomen: Soft, non-tender, non-distended, normoactive bowel sounds. No masses, no organomegal   Extremities: atraumatic,  no deformities, no cyanosis or edema     Neurologic: Alert and oriented X 3, Normal symmetric reflexes. CN II-XII intact. No focal neurological deficits.     Musculoskeletal: Normal ROM, normal strength     Skin: No rash, tears, or ecchymosis, capillary refill brisk       Labs:  Recent Results (from the past 24 hour(s))   CBC auto differential    Collection Time: 10/15/18  6:38 AM   Result Value Ref Range    WBC 6.99 3.90 - 12.70 K/uL    RBC 4.23 (L) 4.60 - 6.20 M/uL    Hemoglobin 12.6 (L) 14.0 - 18.0 g/dL    Hematocrit 35.9 (L) 40.0 - 54.0 %    MCV 85 82 - 98 fL    MCH 29.8 27.0 - 31.0 pg    MCHC 35.1 32.0 - 36.0 g/dL    RDW 14.7 (H) 11.5 - 14.5 %    Platelets 214 150 - 350 K/uL    MPV 9.3 9.2 - 12.9 fL    Immature Granulocytes 0.3 0.0 - 0.5 %    Gran # (ANC) 4.7 1.8 - 7.7 K/uL    Immature Grans (Abs) 0.02 0.00 - 0.04 K/uL    Lymph # 1.4 1.0 - 4.8 K/uL    Mono # 0.7 0.3 - 1.0 K/uL    Eos # 0.2 0.0 - 0.5 K/uL    Baso # 0.05 0.00 - 0.20 K/uL    nRBC 0 0 /100 WBC    Gran% 67.2 38.0 - 73.0 %    Lymph% 19.5 18.0 - 48.0 %    Mono% 9.9 4.0 - 15.0 %    Eosinophil% 2.4 0.0 - 8.0 %    Basophil% 0.7 0.0 - 1.9 %    Differential Method Automated    Basic metabolic panel    Collection Time: 10/15/18  6:38 AM   Result Value Ref Range    Sodium 141 136 - 145 mmol/L    Potassium 3.2 (L) 3.5 - 5.1 mmol/L    Chloride 105 95 - 110 mmol/L    CO2 23 23 - 29 mmol/L    Glucose 112 (H) 70 - 110 mg/dL    BUN, Bld 15 6 - 20 mg/dL    Creatinine 0.9 0.5 - 1.4 mg/dL    Calcium 10.2 8.7 - 10.5 mg/dL    Anion Gap 13 8 - 16 mmol/L    eGFR if African American >60.0 >60 mL/min/1.73 m^2    eGFR if non African American >60.0 >60 mL/min/1.73 m^2       Hemoglobin A1C   Date Value Ref Range Status   08/01/2018 6.2 (H) 4.0 - 5.6 % Final     Comment:     ADA Screening Guidelines:  5.7-6.4%  Consistent with prediabetes  >or=6.5%  Consistent with diabetes  High levels of fetal hemoglobin interfere with the HbA1C  assay. Heterozygous hemoglobin variants (HbS,  HgC, etc)do  not significantly interfere with this assay.   However, presence of multiple variants may affect accuracy.     04/01/2018 5.7 (H) 4.0 - 5.6 % Final     Comment:     According to ADA guidelines, hemoglobin A1c <7.0% represents  optimal control in non-pregnant diabetic patients. Different  metrics may apply to specific patient populations.   Standards of Medical Care in Diabetes-2016.  For the purpose of screening for the presence of diabetes:  <5.7%     Consistent with the absence of diabetes  5.7-6.4%  Consistent with increasing risk for diabetes   (prediabetes)  >or=6.5%  Consistent with diabetes  Currently, no consensus exists for use of hemoglobin A1c  for diagnosis of diabetes for children.  This Hemoglobin A1c assay has significant interference with fetal   hemoglobin   (HbF). The results are invalid for patients with abnormal amounts of   HbF,   including those with known Hereditary Persistence   of Fetal Hemoglobin. Heterozygous hemoglobin variants (HbAS, HbAC,   HbAD, HbAE, HbA2) do not significantly interfere with this assay;   however, presence of multiple variants in a sample may impact the %   interference.     02/09/2018 7.1 (H) 4.0 - 5.6 % Final     Comment:     According to ADA guidelines, hemoglobin A1c <7.0% represents  optimal control in non-pregnant diabetic patients. Different  metrics may apply to specific patient populations.   Standards of Medical Care in Diabetes-2016.  For the purpose of screening for the presence of diabetes:  <5.7%     Consistent with the absence of diabetes  5.7-6.4%  Consistent with increasing risk for diabetes   (prediabetes)  >or=6.5%  Consistent with diabetes  Currently, no consensus exists for use of hemoglobin A1c  for diagnosis of diabetes for children.  This Hemoglobin A1c assay has significant interference with fetal   hemoglobin   (HbF). The results are invalid for patients with abnormal amounts of   HbF,   including those with known Hereditary  Persistence   of Fetal Hemoglobin. Heterozygous hemoglobin variants (HbAS, HbAC,   HbAD, HbAE, HbA2) do not significantly interfere with this assay;   however, presence of multiple variants in a sample may impact the %   interference.         No results for input(s): POCTGLUCOSE in the last 168 hours.    Active Hospital Problems    Diagnosis  POA    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]  Yes     Follows with Dr. Love  By mri.  Several active lesions, many old.  5/13: MRA brain/neck, MRI brain w/wo contrast show no vascular occlusion, multiple foci of hyperintensity in deep cerebral periventricular white matter in pattern of demyelinating process.   5/17: MRI spine performed, no spinal cord lesions.  Hospitalization 6/2017. EEG was performed negative for seizures.  Repeat MRI showing new lesion, question whether this was demyelination versus CVA.  Cytoxan 6/3/17 & 8/14/17        Resolved Hospital Problems   No resolved problems to display.       Assessment and Plan:    CNS Vasculitis:  Acute Confusional State:   Mechanical Falls  -pt presenting with recurrent falls, confusion, instability and other symptoms similar to his presentation of CNS vasculitis in the past  -symptoms started after he was switched from cyclophosphamide to azothioprine  -CT head does not reveal any acute pathology. Redemonstration of patchy and confluent hypoattenuation in the deep cerebral and periventricular white matter in keeping with chronic microvascular ischemic change with superimposed remote lacunar type infarcts.  -Neurology consulted.   -check B12, RPR  -start pulse dose steroid for 3 days with solumedrol 1000mg IV qD  -continue azathioprine for now  -Neurochecks with VS  -Will get bedside swallow, if OK may start diet, otherwise NPO  -PT/OT, progressive mobility  -fall precautions   -Continue correction of metabolic derangements  -Maintain Delirium precautions: Maintain regular sleep cycle. Early ambulation. Minimal interruptions overnight.  Re-orient patient frequently. Maintain adequate bowel regimen, hydration and electrolyte replenishments. Hearing Aids and eye glasses as needed.    Diabetes Mellitus:  -Last HbA1c: 6.2 on 10/15/18  -hold PTA metformin  -SSI with accuchecks qACHS  -scheduled PTA levemir at reduced dose of 20 units BID,novolog 10 units TID  -diabetic diet    Hypertension:  -continue atenolol, diltiazem, irbesartan    Depression:  -hold venlafazine for now as this is a new medication that was started few ago that could be contributing to his symptoms     Dyslipidemia:  -continue atorvastatin    Hx of CVA:  -cont asa and atorvastatin    DVT PPx: SCDs    Teresita Horton MD  Hospital Medicine Staff  [phone] pager

## 2018-10-15 NOTE — HPI
58 yr old male with PMH of CNS vasculitis, T2DM, CVA, who presented with increased lethargy since the past 2-3 weeks. Wife reports that patient was recently started on Imuran for CNS vasculitis. Since then he has apparently been more lethargic, sleeps for most of the day and constantly forgets things. He would forget how to use a remote or a key. He would frequently forget names and objects. 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 He has received 12 monthly infusions of cyclophosphamide with his last dose on 7/11. While on the cyclophosphamide, he was doing well; he would perk up right after the infusions and would decline in the week prior to his next dose. While on the Cytoxan, he was still having intermittent confusion and occasional memory loss. He also had multiple falls in the past few months. No LOC/urinary incontinence but has sustained rib fractures and vertebral fractures from the fall.  Follows up with Dr. Mixon for visual changes. Per his last note on 10/5, vision has been improving.  No h/o seizures.  Per Dr. Love's last clinic note:   - Slowly improving, continues to receive PT and speech therapy   - Changed zoloft to effexor last week, with no clear changes yet.  - Difficult to get him to participate in ADLs - have to force him to bathe, brush teeth, shave. Requires constant reminders              - they feel that if they weren't around he would just lie around all day  - He's still very irritable and stubborn  - Have tried to get home health (nursing, diabetes management) but have been unable to get it  - Tolerating cytoxan well - his wife reports that for a few days after each treatment he has a drastic improvement   - 2 falls recently - January he fell and hit his head, broke 4 ribs; November/December fell and broke vertebrae

## 2018-10-15 NOTE — ASSESSMENT & PLAN NOTE
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 Has been on Imuran since the past 2-3 weeks. Per the wife, has had some personality changes with increased lethargy, confusion since switching over to Imuran. MRI brain done on 9/14 shows supratentorial and infratentorial white matter changes with superimposed infarcts unchanged from prior imaging on 4/2.    - On exam, he is A&O x3, recal is 1/3+0 with prompting, Luria intact, unable to do serial 7s or count the months backwards. No motor/sensory deficits. DTRS 1+ in the lower extremities. Vibration and proprioception intact. No cerebellar signs.  - CT head shows patchy and confluent hypoattenuation in the deep cerebral and periventricular white matter in keeping with chronic microvascular ischemic change with superimposed remote lacunar type infarcts    Assessment and Recommendations:  - Does not appear lethargic on exam. Does have memory deficits which based on prior notes is unchanged.  - Was started on pulse dose steroids (1g IV Solumedrol) at admission. Received 2/3 doses.  - After speaking with Dr. Love, planned to restart the patient on Cytoxan. Will receive the first dose while inpatient. Consult Hem/Onc for Cytoxan infusion.  -Does not need the 3rd dose of steroids.

## 2018-10-15 NOTE — SUBJECTIVE & OBJECTIVE
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 Love By mri.  Several active lesions, many old. 5/13: MRA brain/neck, MRI brain w/wo contrast show no vascular occlusion, multiple foci of hyperintensity in deep cerebral periventricular white matter in pattern of demyelinating process.  5/17: MRI spine performed, no spinal cord lesions. Hospitalization 6/2017. EEG was performed negative for seizures.  Repeat MRI showing new lesion, question whether this was demyelination versus CVA. 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 Findings compatible with a small acute lacunar infarct adjacent to the left frontal horn.  Nonspecific enhancement just inferior to this region and extending into the left basal ganglia, as well as within the inferior aspect of the left cerebellum.  No evidence of a focal mass. 2.  Extensive increased signal intensity involving the periventricular white matter compatible with demyelinating disease versus vasculitis. 3.  Clinical correlation and followup recommended. 4.  This reportedly flattened in the EPIC and the corpus callosum medical record system.    Mixed hyperlipidemia 11/9/2012    NAFLD (nonalcoholic fatty liver disease) 10/11/2013    CHASE (nonalcoholic steatohepatitis)     Obesity     Stroke     Type 2 diabetes mellitus with diabetic polyneuropathy, with long-term current use of insulin 5/14/2017    Type 2 diabetes mellitus with hyperglycemia, with long-term current use of insulin 10/11/2013       Past Surgical History:   Procedure Laterality Date    BIOPSY LIVER AND ULTRASOUND N/A 6/9/2015    Performed by [facility] at [facility] OR [facility] F[facility]    COLONOSCOPY N/A 2/21/2014    Performed by  Mario Aceves [facility] at [facility] ENDO    ESOPHAGOGASTRODUODENOSCOPY (EGD) N/A 5/29/2017    Performed by Hai Carter MD at [facility] ENDO (2ND FLR)    SKIN CANCER EXCISION         Review of patient's allergies indicates:  No Known Allergies      No current facility-administered medications on file prior to encounter.      Current Outpatient Medications on File Prior to Encounter   Medication Sig    alclomethasone (ACLOVATE) 0.05 % cream     aspirin (ECOTRIN) 81 MG EC tablet Take 81 mg by mouth once daily.    atenolol (TENORMIN) 50 MG tablet Take 1 tablet (50 mg total) by mouth once daily.    atorvastatin (LIPITOR) 40 MG tablet Take 1 tablet (40 mg total) by mouth once daily.    azaTHIOprine (IMURAN) 50 mg Tab Take 50mg (1 tab ) daily x5 days, then 100mg (2 tabs) daily x5 days, then 150mg daily (3 tabs thereafter)    blood sugar diagnostic (TRUE METRIX GLUCOSE TEST STRIP) Strp Test blood sugar four times a day    blood sugar diagnostic Strp To check BG 4 times daily, to use with insurance preferred meter    calcium carbonate (OS-DONNA) 500 mg calcium (1,250 mg) tablet Take 2 tablets (1,000 mg total) by mouth once.    diltiaZEM (DILACOR XR) 240 MG CDCR Take 1 capsule (240 mg total) by mouth once daily.    insulin aspart U-100 (NOVOLOG) 100 unit/mL InPn pen Inject 20 Units into the skin 3 (three) times daily with meals.    insulin glargine (BASAGLAR KWIKPEN U-100 INSULIN) 100 unit/mL (3 mL) InPn pen Inject 40 Units into the skin 2 (two) times daily.    insulin syringe-needle U-100 (INSULIN SYRINGE) 1/2 mL 30 gauge x 5/16 Syrg Use 3x/day    insulin syringe-needle U-100 0.5 mL 31 gauge x 5/16 Syrg     irbesartan (AVAPRO) 300 MG tablet Take 1 tablet (300 mg total) by mouth every evening.    ketoconazole (NIZORAL) 2 % cream     lancets 30 gauge Misc Test blood sugar four times a day    lancets Misc To check BG 4 times daily, one touch verio meter. Dx code e11.65    liraglutide 0.6 mg/0.1 mL, 18 mg/3 mL, subq PNIJ  "(VICTOZA 3-TOMASZ) 0.6 mg/0.1 mL (18 mg/3 mL) PnIj Inject 1.8 mg into the skin once daily.    metFORMIN (GLUCOPHAGE-XR) 500 MG 24 hr tablet Take 2 tablets (1,000 mg total) by mouth 2 (two) times daily with meals.    NOVOFINE PLUS 32 gauge x 1/6" Ndle     omega-3 fatty acids-vitamin E (FISH OIL) 1,000 mg Cap Take 1 capsule by mouth 2 (two) times daily.    ONETOUCH DELICA LANCETS 33 gauge Misc     pen needle, diabetic (BD ULTRA-FINE ANA PEN NEEDLE) 32 gauge x 5/32" Ndle Use one needle twice daily    pen needle, diabetic (BD ULTRA-FINE ANA PEN NEEDLE) 32 gauge x 5/32" Ndle 4 times daily insulin administration    venlafaxine (EFFEXOR-XR) 75 MG 24 hr capsule Take 2 capsules (150 mg total) by mouth once daily.    vitamin D 1000 units Tab Take 5 tablets (5,000 Units total) by mouth once daily.    blood-glucose meter kit To check BG 4 times daily, one touch verio meter. Dx code e11.65     Family History     Problem Relation (Age of Onset)    Cancer Father    Cataracts Mother    Diabetes Maternal Aunt    Heart disease Mother    Heart failure Mother    Hypertension Mother    Rheum arthritis Paternal Grandmother    Stroke Sister        Tobacco Use    Smoking status: Never Smoker    Smokeless tobacco: Never Used   Substance and Sexual Activity    Alcohol use: No     Alcohol/week: 0.0 oz    Drug use: No    Sexual activity: Not on file     Review of Systems   Constitutional: Negative for chills and fever.   Cardiovascular: Negative for chest pain.   Gastrointestinal: Negative for abdominal distention.   Genitourinary: Negative for difficulty urinating.   Musculoskeletal: Negative for arthralgias.   Neurological: Negative for dizziness, seizures, weakness, numbness and headaches.   Psychiatric/Behavioral: Negative for agitation.     Objective:     Vital Signs (Most Recent):  Temp: 98 °F (36.7 °C) (10/15/18 1300)  Pulse: 99 (10/15/18 1300)  Resp: 18 (10/15/18 1300)  BP: (!) 162/90 (10/15/18 1300)  SpO2: 98 % (10/15/18 " 1300) Vital Signs (24h Range):  Temp:  [98 °F (36.7 °C)-99 °F (37.2 °C)] 98 °F (36.7 °C)  Pulse:  [] 99  Resp:  [18-20] 18  SpO2:  [95 %-99 %] 98 %  BP: (134-183)/() 162/90     Weight: 99.8 kg (220 lb)  Body mass index is 31.57 kg/m².    Physical Exam   Constitutional: He is oriented to person, place, and time. No distress.   Eyes: EOM are normal. Pupils are equal, round, and reactive to light.   Cardiovascular: Normal rate.   Pulmonary/Chest: Effort normal.   Neurological: He is alert and oriented to person, place, and time. He has a normal Finger-Nose-Finger Test.   Reflex Scores:       Bicep reflexes are 2+ on the right side and 2+ on the left side.       Brachioradialis reflexes are 2+ on the right side and 2+ on the left side.       Patellar reflexes are 2+ on the right side and 2+ on the left side.       Achilles reflexes are 1+ on the right side and 1+ on the left side.  Psychiatric: His speech is normal.   Nursing note and vitals reviewed.      NEUROLOGICAL EXAMINATION:     MENTAL STATUS   Oriented to person, place, and time.   Oriented to person.   Oriented to place.   Oriented to time.   Registration: recalls 3 of 3 objects. Recall at 5 minutes: recalls 1 of 3 objects. Follows 2 step commands.   Attention: decreased. Concentration: decreased.   Speech: speech is normal   Level of consciousness: alert  Knowledge: good.     CRANIAL NERVES     CN III, IV, VI   Pupils are equal, round, and reactive to light.  Extraocular motions are normal.     CN V   Facial sensation intact.     CN VII   Facial expression full, symmetric.     CN VIII   CN VIII normal.     CN IX, X   CN IX normal.     CN XI   CN XI normal.     CN XII   CN XII normal.     MOTOR EXAM   Muscle bulk: normal  Overall muscle tone: normal  Right arm pronator drift: absent  Left arm pronator drift: absent    Strength   Right deltoid: 5/5  Left deltoid: 5/5  Right biceps: 5/5  Left biceps: 5/5  Right quadriceps: 5/5  Left quadriceps:  5/5  Right peroneal: 5/5  Left peroneal: 5/5  Right gastroc: 5/5  Left gastroc: 5/5    REFLEXES     Reflexes   Right brachioradialis: 2+  Left brachioradialis: 2+  Right biceps: 2+  Left biceps: 2+  Right patellar: 2+  Left patellar: 2+  Right achilles: 1+  Left achilles: 1+  Right plantar: normal  Left plantar: normal  Right ankle clonus: absent  Left ankle clonus: absent    SENSORY EXAM   Light touch normal.     GAIT AND COORDINATION      Coordination   Finger to nose coordination: normal    Tremor   Resting tremor: absent      Significant Labs:   Recent Lab Results       10/15/18  0638      Immature Granulocytes 0.3     Immature Grans (Abs) 0.02  Comment:  Mild elevation in immature granulocytes is non specific and   can be seen in a variety of conditions including stress response,   acute inflammation, trauma and pregnancy. Correlation with other   laboratory and clinical findings is essential.       Anion Gap 13     Baso # 0.05     Basophil% 0.7     BUN, Bld 15     Calcium 10.2     Chloride 105     CO2 23     Creatinine 0.9     Differential Method Automated     eGFR if African American >60.0     eGFR if non  >60.0  Comment:  Calculation used to obtain the estimated glomerular filtration  rate (eGFR) is the CKD-EPI equation.        Eos # 0.2     Eosinophil% 2.4     Glucose 112     Gran # (ANC) 4.7     Gran% 67.2     Hematocrit 35.9     Hemoglobin 12.6     Lymph # 1.4     Lymph% 19.5     MCH 29.8     MCHC 35.1     MCV 85     Mono # 0.7     Mono% 9.9     MPV 9.3     nRBC 0     Platelets 214     Potassium 3.2     RBC 4.23     RDW 14.7     Sodium 141     WBC 6.99         All pertinent lab results from the past 24 hours have been reviewed.    Significant Imaging: I have reviewed all pertinent imaging results/findings within the past 24 hours.

## 2018-10-15 NOTE — ED NOTES
Pt resting on stretcher in NAD, respirations even and unlabored. Stretcher locked and in lowest position, side rails x 2, call bell within reach. Cardiac monitor, pulse ox and BP cuff applied cycling Q 30 minute vitals. Pt offered restroom assistance, pt states no needs at this time, wife at the bedside. Will continue to monitor and update pt on plan of care.

## 2018-10-15 NOTE — ED PROVIDER NOTES
Encounter Date: 10/15/2018    SCRIBE #1 NOTE: I, Joe Agosto, am scribing for, and in the presence of,  Dr. Chavarria. I have scribed the following portions of the note - the Resident attestation. Other sections scribed: Head CT.       History     Chief Complaint   Patient presents with    Head Injury     C/o losing balance and hitting head on dresser when trying to go to bathroom. Denies LOC. No blood thinners. Hx CNS vasculitis. Wife endorses pt has been having worsening confusion x 1 week.     Patient is a 58 year old male with history of CNS vasculitis, DM2, prev. CVA (lacunar infarct of left subthalamic region) who presents 3 hours following mechanical fall from standing at home. Patient was ambulating from the bathroom of his son's home around 3AM when he stumbled and fell, hitting his head and his arm on a dresser in the bedroom. He denies any confusion or loss of consciousness surrounding the event and experienced a short headache that has since resolved and some mild upper R arm pain that has also resolved. Per patient's wife, he has been taking cyclophosphamide for about 1 year for his CNS vasculitis and since that time has not been his normal self, having intermittent confusion, shuffling gait, and occasional memory loss with slight changes in personality. Approximately 2 weeks ago he was switched from monthly cyclophosphamide infusions to azathioprine and she notes that the patient has had worsening in all changes in personality and confusion acutely over the last week. She reports a history of multiple mechanical falls over the past week, most onto carpet, none with head injury or LOC. Patient did not experience any bowel or bladder incontinence following this fall. He denies any numbness or paresthesias. He takes 81mg ASA daily but is on no other form of anticoagulation.      The history is provided by the patient and the spouse. No  was used.   Fall   The accident occurred 3 to 5 hours  ago. The fall occurred while walking. He fell from a height of 3 to 5 ft. Impact surface: corner of hardwood furniture. There was no blood loss. The point of impact was the head and right shoulder. He was ambulatory at the scene. There was no entrapment after the fall. There was no drug use involved in the accident. There was no alcohol use involved in the accident. Associated symptoms include headaches. Pertinent negatives include no neck pain, no back pain, no paresthesias, no paralysis, no visual change, no fever, no numbness, no abdominal pain, no bowel incontinence, no nausea, no vomiting, no loss of consciousness and no tingling. He has tried nothing for the symptoms.     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 Love By mri.  Several active lesions, many old. 5/13: MRA brain/neck, MRI brain w/wo contrast show no vascular occlusion, multiple foci of hyperintensity in deep cerebral periventricular white matter in pattern of demyelinating process.  5/17: MRI spine performed, no spinal cord lesions. Hospitalization 6/2017. EEG was performed negative for seizures.  Repeat MRI showing new lesion, question whether this was demyelination versus CVA. 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 Findings compatible with a small acute lacunar infarct adjacent to the left frontal horn.  Nonspecific enhancement just inferior to this region and extending into the left basal ganglia, as well as within the inferior aspect of the left cerebellum.  No evidence of a focal mass. 2.  Extensive increased signal intensity involving the periventricular white matter compatible with  demyelinating disease versus vasculitis. 3.  Clinical correlation and followup recommended. 4.  This reportedly flattened in the EPIC and the corpus callosum medical record system.    Mixed hyperlipidemia 11/9/2012    NAFLD (nonalcoholic fatty liver disease) 10/11/2013    CHASE (nonalcoholic steatohepatitis)     Obesity     Stroke     Type 2 diabetes mellitus with diabetic polyneuropathy, with long-term current use of insulin 5/14/2017    Type 2 diabetes mellitus with hyperglycemia, with long-term current use of insulin 10/11/2013     Past Surgical History:   Procedure Laterality Date    BIOPSY LIVER AND ULTRASOUND N/A 6/9/2015    Performed by [facility] at [facility] OR 2ND FLR    COLONOSCOPY N/A 2/21/2014    Per[facility] by [facility] at [facility] ENDO    ESOPHAGOGASTRODUODENOSCOPY (EGD) N/A 5/29/2017    Performed by Hai Carter MD at [facility] ENDO (2ND FLR)    SKIN CANCER EXCISION       Family History   Problem Relation Age of Onset    Heart disease Mother     Hypertension Mother     Heart failure Mother     Cataracts Mother     Cancer Father         lung    Diabetes Maternal Aunt     Stroke Sister     Rheum arthritis Paternal Grandmother     Amblyopia Neg Hx     Blindness Neg Hx     Glaucoma Neg Hx     Macular degeneration Neg Hx     Retinal detachment Neg Hx     Strabismus Neg Hx      Social History     Tobacco Use    Smoking status: Never Smoker    Smokeless tobacco: Never Used   Substance Use Topics    Alcohol use: No     Alcohol/week: 0.0 oz    Drug use: No     Review of Systems   Constitutional: Positive for activity change, appetite change (decreased) and fatigue. Negative for chills, diaphoresis and fever.   HENT: Positive for postnasal drip and sneezing. Negative for rhinorrhea, sinus pressure, sinus pain and sore throat.    Respiratory: Positive for cough (nonproductive). Negative for chest tightness and shortness of breath.    Cardiovascular: Negative for chest pain,  palpitations and leg swelling.   Gastrointestinal: Negative for abdominal pain, bowel incontinence, constipation, diarrhea, nausea and vomiting.   Genitourinary: Negative for dysuria and enuresis.   Musculoskeletal: Negative for arthralgias, back pain, myalgias, neck pain and neck stiffness.   Skin: Negative for rash.   Neurological: Positive for headaches. Negative for dizziness, tingling, tremors, seizures, loss of consciousness, syncope, speech difficulty, weakness, light-headedness, numbness and paresthesias.   Hematological: Does not bruise/bleed easily.   Psychiatric/Behavioral: Positive for decreased concentration. Negative for confusion. The patient is not nervous/anxious.        Physical Exam     Initial Vitals [10/15/18 0553]   BP Pulse Resp Temp SpO2   (!) 140/92 (!) 116 20 99 °F (37.2 °C) 97 %      MAP       --         Physical Exam    Nursing note and vitals reviewed.  Constitutional: He appears well-developed and well-nourished. No distress.   HENT:   Head: Normocephalic.   Right Ear: External ear normal.   Left Ear: External ear normal.   Nose: Nose normal.   Mouth/Throat: Oropharynx is clear and moist. No oropharyngeal exudate.   There is a small linear abrasion to the right superior region of the head without bleeding or oozing.    Eyes: Conjunctivae and EOM are normal. Pupils are equal, round, and reactive to light. Right eye exhibits no discharge. Left eye exhibits no discharge. No scleral icterus.   Horizontal nystagmus    Neck: Normal range of motion. Neck supple. No JVD present.   Cardiovascular: Normal rate, regular rhythm and intact distal pulses.   Pulmonary/Chest: Breath sounds normal. No stridor. No respiratory distress. He has no wheezes. He has no rhonchi. He has no rales.   Abdominal: Soft. He exhibits no distension. There is no tenderness. There is no guarding.   Musculoskeletal: Normal range of motion. He exhibits no edema.   Neurological: He is alert and oriented to person, place,  and time. He has normal strength. No cranial nerve deficit or sensory deficit. GCS score is 15. GCS eye subscore is 4. GCS verbal subscore is 5. GCS motor subscore is 6.   Skin: Skin is warm and dry. Capillary refill takes less than 2 seconds. No rash noted.   There is an abrasion to the upper arm near the triceps without bleeding or oozing   Psychiatric: His speech is normal and behavior is normal. Judgment and thought content normal. His affect is blunt. He is attentive.         ED Course   Procedures  Labs Reviewed   CBC W/ AUTO DIFFERENTIAL - Abnormal; Notable for the following components:       Result Value    RBC 4.23 (*)     Hemoglobin 12.6 (*)     Hematocrit 35.9 (*)     RDW 14.7 (*)     All other components within normal limits   BASIC METABOLIC PANEL - Abnormal; Notable for the following components:    Potassium 3.2 (*)     Glucose 112 (*)     All other components within normal limits   PROCALCITONIN   HEMOGLOBIN A1C   RPR   TSH   VITAMIN B12   URINALYSIS, REFLEX TO URINE CULTURE   TOXICOLOGY SCREEN, URINE, RANDOM (COMPLIANCE)   HEMOGLOBIN A1C   RPR   VITAMIN B12   PROCALCITONIN   TSH        ECG Results          EKG 12-lead (Final result)  Result time 10/15/18 12:52:30    Final result by Interface, Lab In [facility] (10/15/18 12:52:30)                 Narrative:    Test Reason : R00.0,  Blood Pressure : 162/092 mmHG  Vent. Rate : 103 BPM     Atrial Rate : 103 BPM     P-R Int : 164 ms          QRS Dur : 088 ms      QT Int : 304 ms       P-R-T Axes : 057 -13 133 degrees     QTc Int : 398 ms    Sinus tachycardia  Nonspecific ST and T wave abnormality  LVH  Abnormal ECG  When compared with ECG of 02-APR-2018 07:57,  QRS duration has increased  Criteria for Inferior infarct are no longer Present  Confirmed by CLYDE GONZALEZ MD ([id number]) on 10/15/2018 12:52:20 PM    Referred By: [email or web address]   SELF           Confirmed By:CLYDE GONZALEZ MD                            Imaging Results          CT Head Without Contrast (Final  result)  Result time 10/15/18 07:21:17    Final result by Divine Hatch MD (10/15/18 07:21:17)                 Impression:      1. No CT evidence of acute intracranial abnormality. Clinical correlation and further evaluation as warranted.  2. Redemonstration of patchy and confluent hypoattenuation in the deep cerebral and periventricular white matter in keeping with chronic microvascular ischemic change with superimposed remote lacunar type infarcts as detailed above.      Electronically signed by: Divine Hatch MD  Date:    10/15/2018  Time:    07:21             Narrative:    EXAMINATION:  CT HEAD WITHOUT CONTRAST    CLINICAL HISTORY:  Head trauma, minor, GCS>=13, NOC/NEXUS/CCR neg, first study;    TECHNIQUE:  Low dose axial images were obtained through the head.  Coronal and sagittal reformations were also performed. Contrast was not administered.    COMPARISON:  *Head CT 04/01/2018  *MRI brain 10/03/2018    FINDINGS:  There is no acute intracranial hemorrhage, hydrocephalus, midline shift or mass effect. There is diffuse patchy and more confluent supratentorial and periventricular white matter hypoattenuation which is nonspecific but likely reflects sequela of chronic microvascular ischemic change.  There are superimposed remote lacunar type infarcts including within the bilateral basal ganglia and cerebellar hemispheres.  These findings are relatively unchanged compared to prior examination.  The basilar cisterns are patent.  The paranasal sinuses and mastoid air cells are clear of acute process.  Osseous calvarium is intact.                              X-Rays:   Independently Interpreted Readings:   Head CT: Does not reveal any acute traumatic injury.       Medical Decision Making:   History:   Old Medical Records: I decided to obtain old medical records.  Initial Assessment:   58 year old male with history of CNS vasculitis and prev stroke presents following mechanical fall with head injury and no LOC. Has  been having mild changes in cognition over past week since switching CNS vasculitis treatments from Cytoxan to Imuran.  Differential Diagnosis:   Includes but is not limited to ischemic stroke, drug side effect, subarachnoid hemorrhage, subdural hematoma, concussion, tension headache.   Independently Interpreted Test(s):   I have ordered and independently interpreted X-rays - see prior notes.  Clinical Tests:   Lab Tests: Ordered and Reviewed  The following lab test(s) were unremarkable: CBC       <> Summary of Lab: Stable normocytic anemia  CMP notable for hypokalemia  Radiological Study: Ordered and Reviewed  Medical Tests: Ordered and Reviewed  Other:   I have discussed this case with another health care provider.       APC / Resident Notes:   6:52 AM  Ordered CT scan head WO contrast to evaluate for possible intracranial hemorrhage.     7:57 AM  Consulted and spoke to general neurology team [facility] who will come to assess patient. Updated patient on results of CT scan and plan of care. Ordered 40 meq PO KCl for hypokalemia.    11:00 AM  Spoke to neurology team again who recommended EEG to assess for possible seizure activity. Case discussed with case management and hospital medicine, plan to place in observation.         Scribe Attestation:   Scribe #1: I performed the above scribed service and the documentation accurately describes the services I performed. I attest to the accuracy of the note.    Attending Attestation:   Physician Attestation Statement for Resident:  As the supervising MD   Physician Attestation Statement: I have personally seen and examined this patient.   I agree with the above history. -: 58 y.o. patient fell this morning striking his head, lost balance. He has been having increasing problems with walking and confusion over the past couple of weeks.  Recently meds were changed by neurology for CNS vasculitis.  Will consult Neurology for their evaluation. CT head does not reveal any  acute traumatic injury.      As the supervising MD I agree with the above PE.    As the supervising MD I agree with the above treatment, course, plan, and disposition.                       Clinical Impression:   The primary encounter diagnosis was CNS vasculitis. Diagnoses of Tachycardia, Fall, initial encounter, and Altered mental status, unspecified altered mental status type were also pertinent to this visit.      Disposition:   Disposition: Placed in Observation  Condition: Dariel Mendieta MD  Resident  10/15/18 6122

## 2018-10-16 ENCOUNTER — SOCIAL WORK (OUTPATIENT)
Dept: PSYCHIATRY | Facility: CLINIC | Age: 58
End: 2018-10-16
Payer: COMMERCIAL

## 2018-10-16 DIAGNOSIS — I77.6 CNS VASCULITIS: Primary | ICD-10-CM

## 2018-10-16 PROBLEM — R41.82 ALTERED MENTAL STATUS: Status: ACTIVE | Noted: 2018-04-01

## 2018-10-16 LAB
ANION GAP SERPL CALC-SCNC: 12 MMOL/L
BASOPHILS # BLD AUTO: 0.01 K/UL
BASOPHILS NFR BLD: 0.2 %
BUN SERPL-MCNC: 17 MG/DL
CALCIUM SERPL-MCNC: 10 MG/DL
CHLORIDE SERPL-SCNC: 106 MMOL/L
CO2 SERPL-SCNC: 21 MMOL/L
CREAT SERPL-MCNC: 1 MG/DL
DIFFERENTIAL METHOD: ABNORMAL
EOSINOPHIL # BLD AUTO: 0 K/UL
EOSINOPHIL NFR BLD: 0 %
ERYTHROCYTE [DISTWIDTH] IN BLOOD BY AUTOMATED COUNT: 14.6 %
EST. GFR  (AFRICAN AMERICAN): >60 ML/MIN/1.73 M^2
EST. GFR  (NON AFRICAN AMERICAN): >60 ML/MIN/1.73 M^2
GLUCOSE SERPL-MCNC: 276 MG/DL
HCT VFR BLD AUTO: 36 %
HGB BLD-MCNC: 12.4 G/DL
IMM GRANULOCYTES # BLD AUTO: 0 K/UL
IMM GRANULOCYTES NFR BLD AUTO: 0 %
LYMPHOCYTES # BLD AUTO: 0.4 K/UL
LYMPHOCYTES NFR BLD: 8.8 %
MAGNESIUM SERPL-MCNC: 1.5 MG/DL
MCH RBC QN AUTO: 29.4 PG
MCHC RBC AUTO-ENTMCNC: 34.4 G/DL
MCV RBC AUTO: 85 FL
MONOCYTES # BLD AUTO: 0 K/UL
MONOCYTES NFR BLD: 0.9 %
NEUTROPHILS # BLD AUTO: 3.8 K/UL
NEUTROPHILS NFR BLD: 90.1 %
NRBC BLD-RTO: 0 /100 WBC
PHOSPHATE SERPL-MCNC: 2.1 MG/DL
PLATELET # BLD AUTO: 192 K/UL
PMV BLD AUTO: 9.6 FL
POCT GLUCOSE: 263 MG/DL (ref 70–110)
POCT GLUCOSE: 310 MG/DL (ref 70–110)
POCT GLUCOSE: 367 MG/DL (ref 70–110)
POCT GLUCOSE: 382 MG/DL (ref 70–110)
POCT GLUCOSE: 481 MG/DL (ref 70–110)
POTASSIUM SERPL-SCNC: 4.3 MMOL/L
RBC # BLD AUTO: 4.22 M/UL
RPR SER QL: NORMAL
SODIUM SERPL-SCNC: 139 MMOL/L
WBC # BLD AUTO: 4.22 K/UL

## 2018-10-16 PROCEDURE — 83735 ASSAY OF MAGNESIUM: CPT

## 2018-10-16 PROCEDURE — 85025 COMPLETE CBC W/AUTO DIFF WBC: CPT

## 2018-10-16 PROCEDURE — 36415 COLL VENOUS BLD VENIPUNCTURE: CPT

## 2018-10-16 PROCEDURE — 80048 BASIC METABOLIC PNL TOTAL CA: CPT

## 2018-10-16 PROCEDURE — 99499 UNLISTED E&M SERVICE: CPT | Mod: S$GLB,,, | Performed by: SOCIAL WORKER

## 2018-10-16 PROCEDURE — 99233 SBSQ HOSP IP/OBS HIGH 50: CPT | Mod: ,,, | Performed by: PSYCHIATRY & NEUROLOGY

## 2018-10-16 PROCEDURE — 95813 EEG EXTND MNTR 61-119 MIN: CPT

## 2018-10-16 PROCEDURE — 95813 EEG EXTND MNTR 61-119 MIN: CPT | Mod: 26,,, | Performed by: PSYCHIATRY & NEUROLOGY

## 2018-10-16 PROCEDURE — 11000001 HC ACUTE MED/SURG PRIVATE ROOM

## 2018-10-16 PROCEDURE — 99232 SBSQ HOSP IP/OBS MODERATE 35: CPT | Mod: ,,, | Performed by: HOSPITALIST

## 2018-10-16 PROCEDURE — 84100 ASSAY OF PHOSPHORUS: CPT

## 2018-10-16 PROCEDURE — 63600175 PHARM REV CODE 636 W HCPCS: Performed by: HOSPITALIST

## 2018-10-16 PROCEDURE — C9113 INJ PANTOPRAZOLE SODIUM, VIA: HCPCS | Performed by: HOSPITALIST

## 2018-10-16 PROCEDURE — 25000003 PHARM REV CODE 250: Performed by: HOSPITALIST

## 2018-10-16 RX ORDER — ENOXAPARIN SODIUM 100 MG/ML
40 INJECTION SUBCUTANEOUS EVERY 24 HOURS
Status: DISCONTINUED | OUTPATIENT
Start: 2018-10-16 | End: 2018-10-20 | Stop reason: HOSPADM

## 2018-10-16 RX ORDER — PANTOPRAZOLE SODIUM 40 MG/10ML
40 INJECTION, POWDER, LYOPHILIZED, FOR SOLUTION INTRAVENOUS EVERY 24 HOURS
Status: DISCONTINUED | OUTPATIENT
Start: 2018-10-16 | End: 2018-10-16

## 2018-10-16 RX ADMIN — INSULIN ASPART 6 UNITS: 100 INJECTION, SOLUTION INTRAVENOUS; SUBCUTANEOUS at 07:10

## 2018-10-16 RX ADMIN — VITAMIN D, TAB 1000IU (100/BT) 5000 UNITS: 25 TAB at 08:10

## 2018-10-16 RX ADMIN — PANTOPRAZOLE SODIUM 40 MG: 40 INJECTION, POWDER, FOR SOLUTION INTRAVENOUS at 11:10

## 2018-10-16 RX ADMIN — INSULIN ASPART 10 UNITS: 100 INJECTION, SOLUTION INTRAVENOUS; SUBCUTANEOUS at 07:10

## 2018-10-16 RX ADMIN — DILTIAZEM HYDROCHLORIDE 240 MG: 240 CAPSULE, EXTENDED RELEASE ORAL at 08:10

## 2018-10-16 RX ADMIN — DEXTROSE: 50 INJECTION, SOLUTION INTRAVENOUS at 08:10

## 2018-10-16 RX ADMIN — INSULIN ASPART 10 UNITS: 100 INJECTION, SOLUTION INTRAVENOUS; SUBCUTANEOUS at 06:10

## 2018-10-16 RX ADMIN — INSULIN ASPART 5 UNITS: 100 INJECTION, SOLUTION INTRAVENOUS; SUBCUTANEOUS at 11:10

## 2018-10-16 RX ADMIN — INSULIN ASPART 8 UNITS: 100 INJECTION, SOLUTION INTRAVENOUS; SUBCUTANEOUS at 12:10

## 2018-10-16 RX ADMIN — ATENOLOL 50 MG: 25 TABLET ORAL at 08:10

## 2018-10-16 RX ADMIN — INSULIN DETEMIR 20 UNITS: 100 INJECTION, SOLUTION SUBCUTANEOUS at 09:10

## 2018-10-16 RX ADMIN — AZATHIOPRINE 150 MG: 50 TABLET ORAL at 08:10

## 2018-10-16 RX ADMIN — ASPIRIN 81 MG: 81 TABLET, COATED ORAL at 08:10

## 2018-10-16 RX ADMIN — INSULIN DETEMIR 20 UNITS: 100 INJECTION, SOLUTION SUBCUTANEOUS at 07:10

## 2018-10-16 RX ADMIN — ATORVASTATIN CALCIUM 40 MG: 20 TABLET, FILM COATED ORAL at 08:10

## 2018-10-16 RX ADMIN — INSULIN ASPART 10 UNITS: 100 INJECTION, SOLUTION INTRAVENOUS; SUBCUTANEOUS at 12:10

## 2018-10-16 RX ADMIN — IRBESARTAN 300 MG: 150 TABLET ORAL at 09:10

## 2018-10-16 NOTE — PLAN OF CARE
Problem: Patient Care Overview  Goal: Plan of Care Review  Outcome: Ongoing (interventions implemented as appropriate)  Pt resting in bed, bed alarm in place, pt a&o x4, VSS, call light in reach, side rails up x2, no c/o pain at this time

## 2018-10-16 NOTE — PROGRESS NOTES
[facility]  and [facility] visited pt and pt's wife, yesterday afternoon.  Pt was waiting for room assignment.  He was in good spirits, oriented to date, and recognized both providers but could not remember our names.  Wife shared that pt continues to be more confused since stopping switching medications for his CNS vasculitis.  Additionally, she states he's not walking as well.  SW agreed to visit pt again, if he is admitted.

## 2018-10-16 NOTE — PROGRESS NOTES
Hospital Medicine   Progress Note     Team: [facility]OSP MED R Jayesh Allen MD   Admit Date: 10/15/2018   BELIA    Code status: Full Code   Principal Problem: Acute confusional state     Interval hx: Patient seen and examined at bedside today. Overnight: ROVERTO. This AM, patient doing well, oriented to person and place, not to year or month. Difficulty with short term memory tasks and serial 7's. Neurology at bedside and evaluated as well. Plan to restart Cyclophosphamide injections for Primary CNS Vasculitis, hopefully first infusion in AM. Stopping steroids.     ROS   Constitutional: negative for fevers and chills  Respiratory: negative for cough, shortness of breath   Cardiovascular: negative for chest discomfort, palpitations   Gastrointestinal: negative for nausea or vomiting, abdominal pain, constipation, diarrhea     PEx   Temp:  [96.3 °F (35.7 °C)-98.4 °F (36.9 °C)]   Pulse:  [60-92]   Resp:  [18-20]   BP: (121-178)/(60-99)   SpO2:  [94 %-99 %]      I & O (Last 24H):     Intake/Output Summary (Last 24 hours) at 10/16/2018 1746  Last data filed at 10/16/2018 0800  Gross per 24 hour   Intake 360 ml   Output --   Net 360 ml     General appearance: NAD, cooperative, alert, appears stated age  Lungs: clear to auscultation bilaterally, no wheezes, no crackles, no rales, no rhonchi        Heart: regular rate and rhythm, S1, S2 normal, no murmur              Abdomen: Soft, non-tender, non-distended, normoactive bowel sounds. No masses, no organomegaly    Extremities: atraumatic, no deformities, no cyanosis or edema                                Neurologic: Alert and oriented X 3, Normal symmetric reflexes. CN II-XII intact. No focal neurological deficits.                            Musculoskeletal: Normal ROM, normal strength                               Skin: No rash, tears, or ecchymosis    Recent Results (from the past 24 hour(s))   POCT glucose    Collection Time: 10/15/18  6:36 PM   Result Value Ref Range    POCT  Glucose 127 (H) 70 - 110 mg/dL   POCT glucose    Collection Time: 10/15/18  8:35 PM   Result Value Ref Range    POCT Glucose 134 (H) 70 - 110 mg/dL   Basic Metabolic Panel (BMP)    Collection Time: 10/16/18  4:12 AM   Result Value Ref Range    Sodium 139 136 - 145 mmol/L    Potassium 4.3 3.5 - 5.1 mmol/L    Chloride 106 95 - 110 mmol/L    CO2 21 (L) 23 - 29 mmol/L    Glucose 276 (H) 70 - 110 mg/dL    BUN, Bld 17 6 - 20 mg/dL    Creatinine 1.0 0.5 - 1.4 mg/dL    Calcium 10.0 8.7 - 10.5 mg/dL    Anion Gap 12 8 - 16 mmol/L    eGFR if African American >60.0 >60 mL/min/1.73 m^2    eGFR if non African American >60.0 >60 mL/min/1.73 m^2   Magnesium    Collection Time: 10/16/18  4:12 AM   Result Value Ref Range    Magnesium 1.5 (L) 1.6 - 2.6 mg/dL   Phosphorus    Collection Time: 10/16/18  4:12 AM   Result Value Ref Range    Phosphorus 2.1 (L) 2.7 - 4.5 mg/dL   CBC with Automated Differential    Collection Time: 10/16/18  4:12 AM   Result Value Ref Range    WBC 4.22 3.90 - 12.70 K/uL    RBC 4.22 (L) 4.60 - 6.20 M/uL    Hemoglobin 12.4 (L) 14.0 - 18.0 g/dL    Hematocrit 36.0 (L) 40.0 - 54.0 %    MCV 85 82 - 98 fL    MCH 29.4 27.0 - 31.0 pg    MCHC 34.4 32.0 - 36.0 g/dL    RDW 14.6 (H) 11.5 - 14.5 %    Platelets 192 150 - 350 K/uL    MPV 9.6 9.2 - 12.9 fL    Immature Granulocytes 0.0 0.0 - 0.5 %    Gran # (ANC) 3.8 1.8 - 7.7 K/uL    Immature Grans (Abs) 0.00 0.00 - 0.04 K/uL    Lymph # 0.4 (L) 1.0 - 4.8 K/uL    Mono # 0.0 (L) 0.3 - 1.0 K/uL    Eos # 0.0 0.0 - 0.5 K/uL    Baso # 0.01 0.00 - 0.20 K/uL    nRBC 0 0 /100 WBC    Gran% 90.1 (H) 38.0 - 73.0 %    Lymph% 8.8 (L) 18.0 - 48.0 %    Mono% 0.9 (L) 4.0 - 15.0 %    Eosinophil% 0.0 0.0 - 8.0 %    Basophil% 0.2 0.0 - 1.9 %    Differential Method Automated    POCT glucose    Collection Time: 10/16/18  7:05 AM   Result Value Ref Range    POCT Glucose 263 (H) 70 - 110 mg/dL   POCT glucose    Collection Time: 10/16/18 12:32 PM   Result Value Ref Range    POCT Glucose 310 (H) 70 -  110 mg/dL       Recent Labs   Lab  10/15/18   1624  10/15/18   1836  10/15/18   2035  10/16/18   0705  10/16/18   1232   POCTGLUCOSE  167*  127*  134*  263*  310*        aspirin  81 mg Oral Daily    atenolol  50 mg Oral Daily    atorvastatin  40 mg Oral Daily    azaTHIOprine  150 mg Oral Daily    diltiaZEM  240 mg Oral Daily    insulin aspart U-100  10 Units Subcutaneous TID WM    insulin detemir U-100  20 Units Subcutaneous BID    irbesartan  300 mg Oral QHS    vitamin D  5,000 Units Oral Daily       acetaminophen, dextrose 50%, dextrose 50%, glucagon (human recombinant), glucose, glucose, insulin aspart U-100, ondansetron, sodium chloride 0.9%    Assessment & Plan:  Mr. Nunez is a 58 year old man with a hx of Primary CNS Vasculitis , IDDM2, Hx of CVA who presented to the ED following worsening confusion and a mechanical fall at home. Admitted to Hospital Medicine, and Neurology consulted due to concern for flare of known CNS Vasculitis. Plan to coordinate inpatient then outpatient resumption of Cyclophosphamide infusions, given previous clinical improvement on this regimen.    Primary CNS Vasculitis:  Acute Encephalopathy  Mechanical Falls  -pt presenting with recurrent falls, confusion, instability and other symptoms similar to his presentation of CNS vasculitis in the past. Symptoms started after he was switched from cyclophosphamide to azothioprine  -CT head negative for acute abnormalities. Redemonstration of patchy and confluent hypoattenuation in the deep cerebral and periventricular white matter in keeping with chronic microvascular ischemic change with superimposed remote lacunar type infarcts.  -Neurology consulted, appreciate assistance  - assembling multi-disciplinary team including Heme/Onc and inpatient pharmacy to restart Cyclophosphamide infusions while inpatient > transition to outpatient infusions. Stopped pulse dose steroids (received 2 doses total)  - RPR: NR, UDS: negative, UA: no  evidence of infection, TSH wnl's, Vitamin B12 wnl's  -continue azathioprine for now  -Neurochecks with VS  -PT/OT, progressive mobility  -fall precautions   -Maintain Delirium precautions     Insulin Dependent Diabetes Mellitus, Well Controlled  Long-Term Use of Insulin  -Last HbA1c: 6.2 on 10/15/18  -hold PTA metformin  -SSI with accuchecks qACHS  -scheduled PTA levemir at reduced dose of 20 units BID,novolog 10 units TID  -diabetic diet     Hypertension:  -continue atenolol, diltiazem, irbesartan     Depression:  -hold venlafazine for now as this is a new medication that was started few ago that could be contributing to his symptoms      Dyslipidemia:  -continue atorvastatin     Hx of CVA:  -cont asa and atorvastatin       DVT PPx: Lovenox 40 mg daily  Diet: Diabetic  Dispo: Pending coordination of Cyclophosphamide infusions       Jayesh Allen MD  Hospital Medicine Staff  [facility]   Pager: [phone]

## 2018-10-16 NOTE — PT/OT/SLP PROGRESS
Occupational Therapy      Patient Name:  Bessy Nunez   MRN:  [id number]    Occupational therapy orders acknowledged. Attempted in PM to evaluate patient, however not seen today secondary to currently on EEG monitoring. Will follow-up as scheduled.     Tamar gonzalez OT  10/16/2018

## 2018-10-16 NOTE — PROGRESS NOTES
[facility]  Neurology  Progress Note    Patient Name: Bessy Nunez  MRN: [id number]  Admission Date: 10/15/2018  Hospital Length of Stay: 0 days  Code Status: Full Code   Attending Provider: Jayesh Allen MD  Primary Care Physician: Edgar Nova MD   Principal Problem:<principal problem not specified>      Subjective:     Interval History: No acute events overnight. Seems confrontational on my exam today. Wife reports that he's having paranoid delusions since yesterday evening.    Current Neurological Medications:     Current Facility-Administered Medications   Medication Dose Route Frequency Provider Last Rate Last Dose    acetaminophen tablet 650 mg  650 mg Oral Q4H PRN Teresita Horton MD        aspirin EC tablet 81 mg  81 mg Oral Daily Teresita Horton MD   81 mg at 10/16/18 0807    atenolol tablet 50 mg  50 mg Oral Daily Teresita Horton MD   50 mg at 10/16/18 0806    atorvastatin tablet 40 mg  40 mg Oral Daily Teresita Horton MD   40 mg at 10/16/18 0806    azaTHIOprine tablet 150 mg  150 mg Oral Daily Teresita Horton MD   150 mg at 10/16/18 0808    dextrose 50% injection 12.5 g  12.5 g Intravenous PRN Teresita Horton MD        dextrose 50% injection 25 g  25 g Intravenous PRN Teresita Horton MD        diltiaZEM 24 hr capsule 240 mg  240 mg Oral Daily Teresita Horton MD   240 mg at 10/16/18 0806    glucagon (human recombinant) injection 1 mg  1 mg Intramuscular PRN Teresita Horton MD        glucose chewable tablet 16 g  16 g Oral PRN Teresita Horton MD        glucose chewable tablet 24 g  24 g Oral PRN Teresita Horton MD        insulin aspart U-100 pen 1-10 Units  1-10 Units Subcutaneous QID (AC + HS) PRN Teresita Horton MD   8 Units at 10/16/18 1243    insulin aspart U-100 pen 10 Units  10 Units Subcutaneous TID WM Teresita Horton MD   10 Units at 10/16/18 1243    insulin detemir U-100 pen 20 Units  20 Units Subcutaneous BID Teresita Horton MD   20 Units at  10/16/18 0709    irbesartan tablet 300 mg  300 mg Oral QHS Teresita Horton MD   300 mg at 10/15/18 2130    methylPREDNISolone sodium succinate (SOLU-MEDROL) 1,000 mg in dextrose 5 % 100 mL IVPB   Intravenous Daily Teresita Horton MD        ondansetron disintegrating tablet 8 mg  8 mg Oral Q8H PRN Teresita Horton MD        pantoprazole injection 40 mg  40 mg Intravenous Daily Jayesh Allen MD   40 mg at 10/16/18 1115    sodium chloride 0.9% flush 5 mL  5 mL Intravenous PRN Teresita Horton MD        vitamin D 1000 units tablet 5,000 Units  5,000 Units Oral Daily Teresita Horton MD   5,000 Units at 10/16/18 0806       Review of Systems   Unable to perform ROS: Other     Objective:     Vital Signs (Most Recent):  Temp: 98.4 °F (36.9 °C) (10/16/18 1126)  Pulse: 88 (10/16/18 1126)  Resp: 20 (10/16/18 1126)  BP: (!) 142/82 (10/16/18 1126)  SpO2: 95 % (10/16/18 1126) Vital Signs (24h Range):  Temp:  [96.3 °F (35.7 °C)-98.4 °F (36.9 °C)] 98.4 °F (36.9 °C)  Pulse:  [60-94] 88  Resp:  [16-20] 20  SpO2:  [95 %-99 %] 95 %  BP: (133-182)/() 142/82     Weight: 99.3 kg (218 lb 14.7 oz)  Body mass index is 31.41 kg/m².    Physical Exam   Constitutional: He is oriented to person, place, and time. No distress.   Eyes: EOM are normal. Pupils are equal, round, and reactive to light.   Cardiovascular: Normal rate and regular rhythm.   Pulmonary/Chest: Effort normal.   Abdominal: Soft.   Musculoskeletal: He exhibits no edema.   Neurological: He is alert and oriented to person, place, and time. He has a normal Finger-Nose-Finger Test.   Reflex Scores:       Bicep reflexes are 2+ on the right side and 2+ on the left side.       Brachioradialis reflexes are 2+ on the right side and 2+ on the left side.       Patellar reflexes are 1+ on the right side and 1+ on the left side.       Achilles reflexes are 1+ on the right side and 1+ on the left side.  Psychiatric: His speech is normal.   Paranoid delusions and intermittent  agitation.   Nursing note and vitals reviewed.      NEUROLOGICAL EXAMINATION:     MENTAL STATUS   Oriented to person, place, and time.   Oriented to person.   Oriented to place.   Oriented to time.   Registration: recalls 3 of 3 objects. Recall at 5 minutes: recalls 1 of 3 objects.   Attention: decreased. Concentration: decreased.   Speech: speech is normal   Level of consciousness: alert  Unable to perform simple calculations.     CRANIAL NERVES     CN III, IV, VI   Pupils are equal, round, and reactive to light.  Extraocular motions are normal.     CN V   Facial sensation intact.     CN VII   Facial expression full, symmetric.     CN IX, X   CN IX normal.     CN XII   CN XII normal.     MOTOR EXAM   Muscle bulk: normal  Overall muscle tone: normal  Right arm pronator drift: absent  Left arm pronator drift: absent    Strength   Right deltoid: 5/5  Left deltoid: 5/5  Right biceps: 5/5  Left biceps: 5/5  Right quadriceps: 5/5  Left quadriceps: 5/5  Right peroneal: 5/5  Left peroneal: 5/5  Right gastroc: 5/5  Left gastroc: 5/5    REFLEXES     Reflexes   Right brachioradialis: 2+  Left brachioradialis: 2+  Right biceps: 2+  Left biceps: 2+  Right patellar: 1+  Left patellar: 1+  Right achilles: 1+  Left achilles: 1+  Right plantar: normal  Left plantar: normal  Right ankle clonus: absent  Left ankle clonus: absent    SENSORY EXAM   Light touch normal.     GAIT AND COORDINATION      Coordination   Finger to nose coordination: normal    Tremor   Resting tremor: absent      Significant Labs:   Recent Lab Results       10/16/18  1232 10/16/18  0705 10/16/18  0412 10/15/18  2035 10/15/18  1836      Alcohol, Urine          Benzodiazepines          Methadone metabolites          Phencyclidine          Immature Granulocytes   0.0       Immature Grans (Abs)   0.00  Comment:  Mild elevation in immature granulocytes is non specific and   can be seen in a variety of conditions including stress response,   acute inflammation,  trauma and pregnancy. Correlation with other   laboratory and clinical findings is essential.         Procalcitonin          Amphetamine Screen, Ur          Anion Gap   12       Appearance, UA          Bacteria, UA          Barbiturate Screen, Ur          Baso #   0.01       Basophil%   0.2       Bilirubin (UA)          BUN, Bld   17       Calcium   10.0       Chloride   106       CO2   21       Cocaine (Metab.)          Color, UA          Creatinine   1.0       Creatinine, Random Ur          Differential Method   Automated       eGFR if    >60.0       eGFR if non    >60.0  Comment:  Calculation used to obtain the estimated glomerular filtration  rate (eGFR) is the CKD-EPI equation.          Eos #   0.0       Eosinophil%   0.0       Glucose   276       Glucose, UA          Gran # (ANC)   3.8       Gran%   90.1       Hematocrit   36.0       Hemoglobin   12.4       Hyaline Casts, UA          Ketones, UA          Leukocytes, UA          Lymph #   0.4       Lymph%   8.8       Magnesium   1.5       MCH   29.4       MCHC   34.4       MCV   85       Microscopic Comment          Mono #   0.0       Mono%   0.9       MPV   9.6       Nitrite, UA          nRBC   0       Occult Blood UA          Opiate Scrn, Ur          pH, UA          Phosphorus   2.1       Platelets   192       POCT Glucose 310 263  134 127     Potassium   4.3       Protein, UA          RBC   4.22       RBC, UA          RDW   14.6       RPR          Sodium   139       Specific Gravity, UA          Specimen UA          Marijuana (THC) Metabolite          Toxicology Information          Urobilinogen, UA          Vitamin B-12          WBC, UA          WBC   4.22                   10/15/18  1727 10/15/18  1624      Alcohol, Urine <10      Benzodiazepines Presumptive Positive      Methadone metabolites Negative      Phencyclidine Negative      Immature Granulocytes       Immature Grans (Abs)       Procalcitonin 0.13  Comment:  A  concentration < 0.25 ng/mL represents a low risk bacterial   infection.  Procalcitonin may not be accurate among patients with localized   infection, recent trauma or major surgery, immunosuppressed state,   invasive fungal infection, renal dysfunction. Decisions regarding   initiation or continuation of antibiotic therapy should not be based   solely on procalcitonin levels.        Amphetamine Screen, Ur Negative      Anion Gap       Appearance, UA Hazy      Bacteria, UA None      Barbiturate Screen, Ur Negative      Baso #       Basophil%       Bilirubin (UA) Negative      BUN, Bld       Calcium       Chloride       CO2       Cocaine (Metab.) Negative      Color, UA Yellow      Creatinine       Creatinine, Random Ur 205.0  Comment:  The random urine reference ranges provided were established   for 24 hour urine collections.  No reference ranges exist for  random urine specimens.  Correlate clinically.        Differential Method       eGFR if        eGFR if non        Eos #       Eosinophil%       Glucose       Glucose, UA Negative      Gran # (ANC)       Gran%       Hematocrit       Hemoglobin       Hyaline Casts, UA 9      Ketones, UA Negative      Leukocytes, UA Negative      Lymph #       Lymph%       Magnesium       MCH       MCHC       MCV       Microscopic Comment SEE COMMENT  Comment:  Other formed elements not mentioned in the report are not   present in the microscopic examination.         Mono #       Mono%       MPV       Nitrite, UA Negative      nRBC       Occult Blood UA 1+      Opiate Scrn, Ur Negative      pH, UA 5.0      Phosphorus       Platelets       POCT Glucose  167     Potassium       Protein, UA 2+  Comment:  Recommend a 24 hour urine protein or a urine   protein/creatinine ratio if globulin induced proteinuria is  clinically suspected.        RBC       RBC, UA 14      RDW       RPR Non-reactive      Sodium       Specific Gravity, UA 1.025      Specimen UA  Urine, Clean Catch      Marijuana (THC) Metabolite Negative      Toxicology Information SEE COMMENT  Comment:  This screen includes the following classes of drugs at the   listed cut-off:  Benzodiazepines                  200 ng/ml  Methadone                        300 ng/ml  Cocaine metabolite               300 ng/ml  Opiates                          300 ng/ml  Barbiturates                     200 ng/ml  Amphetamines                    1000 ng/ml  Marijuana metabs (THC)            50 ng/ml  Phencyclidine (PCP)               25 ng/ml  High concentrations of Diphenhydramine may cross-react with  Phencyclidine PCP screening immunoassay giving a false   positive result.  High concentrations of Methylenedioxymethamphetamine (MDMA aka  Ectasy) and other structurally similar compounds may cross-   react with the Amphetamine/Methamphetamine screening   immunoassay giving a false positive result.  A metabolite of the anti-HIV drug Sustiva () may cause  false positive results in the Marijuana metabolite (THC)   screening assay.  Note: This exception list includes only more common   interferants in toxicology screen testing.  Because of many   cross-reactantspositive results on toxicology drug screens   should be confirmed whenever results do not correlate with   clinical presentation.  This report is intended for use in clinical monitoring and  management of patients. It is not intended for use in   employment related drug testing.  Because of any cross-reactants, positive results on toxicology  drug screens should be confirmed whenever results do not  correlate with clinical presentation.  Presumptive positive results are unconfirmed and may be used   only for medical purposes.        Urobilinogen, UA Negative      Vitamin B-12 251      WBC, UA 1      WBC           All pertinent lab results from the past 24 hours have been reviewed.    Significant Imaging: I have reviewed all pertinent imaging results/findings within  the past 24 hours.    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 Has been on Imuran since the past 2-3 weeks. Per the wife, has had some personality changes with increased lethargy, confusion since switching over to Imuran. MRI brain done on 9/14 shows supratentorial and infratentorial white matter changes with superimposed infarcts unchanged from prior imaging on 4/2.    - On exam, he is A&O x3, recal is 1/3+0 with prompting, Luria intact, unable to do serial 7s or count the months backwards. No motor/sensory deficits. DTRS 1+ in the lower extremities. Vibration and proprioception intact. No cerebellar signs.  - CT head shows patchy and confluent hypoattenuation in the deep cerebral and periventricular white matter in keeping with chronic microvascular ischemic change with superimposed remote lacunar type infarcts    Assessment and Recommendations:  - Does not appear lethargic on exam. Does have memory deficits which based on prior notes is unchanged.  - Was started on pulse dose steroids (1g IV Solumedrol) at admission. Received 2/3 doses.  - After speaking with Dr. Love, planned to restart the patient on Cytoxan. Will receive the first dose while inpatient. Consult Hem/Onc for Cytoxan infusion.  -Does not need the 3rd dose of steroids.            VTE Risk Mitigation (From admission, onward)        Ordered     Place sequential compression device  Until discontinued      10/15/18 1344     Place ELIZABETH hose  Until discontinued      10/15/18 1344     IP VTE HIGH RISK PATIENT  Once      10/15/18 1344          Abe Kwok MD  Neurology  [facility]

## 2018-10-16 NOTE — SUBJECTIVE & OBJECTIVE
Subjective:     Interval History: No acute events overnight. Seems confrontational on my exam today. Wife reports that he's having paranoid delusions since yesterday evening.    Current Neurological Medications:     Current Facility-Administered Medications   Medication Dose Route Frequency Provider Last Rate Last Dose    acetaminophen tablet 650 mg  650 mg Oral Q4H PRN Teresita Horton MD        aspirin EC tablet 81 mg  81 mg Oral Daily Teresita Horton MD   81 mg at 10/16/18 0807    atenolol tablet 50 mg  50 mg Oral Daily Teresita Horton MD   50 mg at 10/16/18 0806    atorvastatin tablet 40 mg  40 mg Oral Daily Teresita Horton MD   40 mg at 10/16/18 0806    azaTHIOprine tablet 150 mg  150 mg Oral Daily Teresita Horton MD   150 mg at 10/16/18 0808    dextrose 50% injection 12.5 g  12.5 g Intravenous PRN Teresita Horton MD        dextrose 50% injection 25 g  25 g Intravenous PRN Teresita Horton MD        diltiaZEM 24 hr capsule 240 mg  240 mg Oral Daily Teresita Horton MD   240 mg at 10/16/18 0806    glucagon (human recombinant) injection 1 mg  1 mg Intramuscular PRN Teresita Horton MD        glucose chewable tablet 16 g  16 g Oral PRN Tereista Horton MD        glucose chewable tablet 24 g  24 g Oral PRN Teresita Horton MD        insulin aspart U-100 pen 1-10 Units  1-10 Units Subcutaneous QID (AC + HS) PRN Teresita Horton MD   8 Units at 10/16/18 1243    insulin aspart U-100 pen 10 Units  10 Units Subcutaneous TID WM Teresita Horton MD   10 Units at 10/16/18 1243    insulin detemir U-100 pen 20 Units  20 Units Subcutaneous BID Teresita Horton MD   20 Units at 10/16/18 0709    irbesartan tablet 300 mg  300 mg Oral QHS Teresita Horton MD   300 mg at 10/15/18 2130    methylPREDNISolone sodium succinate (SOLU-MEDROL) 1,000 mg in dextrose 5 % 100 mL IVPB   Intravenous Daily Teresita Horton MD        ondansetron disintegrating tablet 8 mg  8 mg Oral Q8H PRN Teresita Horton MD         pantoprazole injection 40 mg  40 mg Intravenous Daily Jayesh Allen MD   40 mg at 10/16/18 1115    sodium chloride 0.9% flush 5 mL  5 mL Intravenous PRN Teresita Horton MD        vitamin D 1000 units tablet 5,000 Units  5,000 Units Oral Daily Teresita Horton MD   5,000 Units at 10/16/18 0806       Review of Systems   Unable to perform ROS: Other     Objective:     Vital Signs (Most Recent):  Temp: 98.4 °F (36.9 °C) (10/16/18 1126)  Pulse: 88 (10/16/18 1126)  Resp: 20 (10/16/18 1126)  BP: (!) 142/82 (10/16/18 1126)  SpO2: 95 % (10/16/18 1126) Vital Signs (24h Range):  Temp:  [96.3 °F (35.7 °C)-98.4 °F (36.9 °C)] 98.4 °F (36.9 °C)  Pulse:  [60-94] 88  Resp:  [16-20] 20  SpO2:  [95 %-99 %] 95 %  BP: (133-182)/() 142/82     Weight: 99.3 kg (218 lb 14.7 oz)  Body mass index is 31.41 kg/m².    Physical Exam   Constitutional: He is oriented to person, place, and time. No distress.   Eyes: EOM are normal. Pupils are equal, round, and reactive to light.   Cardiovascular: Normal rate and regular rhythm.   Pulmonary/Chest: Effort normal.   Abdominal: Soft.   Musculoskeletal: He exhibits no edema.   Neurological: He is alert and oriented to person, place, and time. He has a normal Finger-Nose-Finger Test.   Reflex Scores:       Bicep reflexes are 2+ on the right side and 2+ on the left side.       Brachioradialis reflexes are 2+ on the right side and 2+ on the left side.       Patellar reflexes are 1+ on the right side and 1+ on the left side.       Achilles reflexes are 1+ on the right side and 1+ on the left side.  Psychiatric: His speech is normal.   Paranoid delusions and intermittent agitation.   Nursing note and vitals reviewed.      NEUROLOGICAL EXAMINATION:     MENTAL STATUS   Oriented to person, place, and time.   Oriented to person.   Oriented to place.   Oriented to time.   Registration: recalls 3 of 3 objects. Recall at 5 minutes: recalls 1 of 3 objects.   Attention: decreased. Concentration: decreased.    Speech: speech is normal   Level of consciousness: alert  Unable to perform simple calculations.     CRANIAL NERVES     CN III, IV, VI   Pupils are equal, round, and reactive to light.  Extraocular motions are normal.     CN V   Facial sensation intact.     CN VII   Facial expression full, symmetric.     CN IX, X   CN IX normal.     CN XII   CN XII normal.     MOTOR EXAM   Muscle bulk: normal  Overall muscle tone: normal  Right arm pronator drift: absent  Left arm pronator drift: absent    Strength   Right deltoid: 5/5  Left deltoid: 5/5  Right biceps: 5/5  Left biceps: 5/5  Right quadriceps: 5/5  Left quadriceps: 5/5  Right peroneal: 5/5  Left peroneal: 5/5  Right gastroc: 5/5  Left gastroc: 5/5    REFLEXES     Reflexes   Right brachioradialis: 2+  Left brachioradialis: 2+  Right biceps: 2+  Left biceps: 2+  Right patellar: 1+  Left patellar: 1+  Right achilles: 1+  Left achilles: 1+  Right plantar: normal  Left plantar: normal  Right ankle clonus: absent  Left ankle clonus: absent    SENSORY EXAM   Light touch normal.     GAIT AND COORDINATION      Coordination   Finger to nose coordination: normal    Tremor   Resting tremor: absent      Significant Labs:   Recent Lab Results       10/16/18  1232 10/16/18  0705 10/16/18  0412 10/15/18  2035 10/15/18  1836      Alcohol, Urine          Benzodiazepines          Methadone metabolites          Phencyclidine          Immature Granulocytes   0.0       Immature Grans (Abs)   0.00  Comment:  Mild elevation in immature granulocytes is non specific and   can be seen in a variety of conditions including stress response,   acute inflammation, trauma and pregnancy. Correlation with other   laboratory and clinical findings is essential.         Procalcitonin          Amphetamine Screen, Ur          Anion Gap   12       Appearance, UA          Bacteria, UA          Barbiturate Screen, Ur          Baso #   0.01       Basophil%   0.2       Bilirubin (UA)          BUN, Bld   17        Calcium   10.0       Chloride   106       CO2   21       Cocaine (Metab.)          Color, UA          Creatinine   1.0       Creatinine, Random Ur          Differential Method   Automated       eGFR if    >60.0       eGFR if non    >60.0  Comment:  Calculation used to obtain the estimated glomerular filtration  rate (eGFR) is the CKD-EPI equation.          Eos #   0.0       Eosinophil%   0.0       Glucose   276       Glucose, UA          Gran # (ANC)   3.8       Gran%   90.1       Hematocrit   36.0       Hemoglobin   12.4       Hyaline Casts, UA          Ketones, UA          Leukocytes, UA          Lymph #   0.4       Lymph%   8.8       Magnesium   1.5       MCH   29.4       MCHC   34.4       MCV   85       Microscopic Comment          Mono #   0.0       Mono%   0.9       MPV   9.6       Nitrite, UA          nRBC   0       Occult Blood UA          Opiate Scrn, Ur          pH, UA          Phosphorus   2.1       Platelets   192       POCT Glucose 310 263  134 127     Potassium   4.3       Protein, UA          RBC   4.22       RBC, UA          RDW   14.6       RPR          Sodium   139       Specific Gravity, UA          Specimen UA          Marijuana (THC) Metabolite          Toxicology Information          Urobilinogen, UA          Vitamin B-12          WBC, UA          WBC   4.22                   10/15/18  1727 10/15/18  1624      Alcohol, Urine <10      Benzodiazepines Presumptive Positive      Methadone metabolites Negative      Phencyclidine Negative      Immature Granulocytes       Immature Grans (Abs)       Procalcitonin 0.13  Comment:  A concentration < 0.25 ng/mL represents a low risk bacterial   infection.  Procalcitonin may not be accurate among patients with localized   infection, recent trauma or major surgery, immunosuppressed state,   invasive fungal infection, renal dysfunction. Decisions regarding   initiation or continuation of antibiotic therapy should not be  based   solely on procalcitonin levels.        Amphetamine Screen, Ur Negative      Anion Gap       Appearance, UA Hazy      Bacteria, UA None      Barbiturate Screen, Ur Negative      Baso #       Basophil%       Bilirubin (UA) Negative      BUN, Bld       Calcium       Chloride       CO2       Cocaine (Metab.) Negative      Color, UA Yellow      Creatinine       Creatinine, Random Ur 205.0  Comment:  The random urine reference ranges provided were established   for 24 hour urine collections.  No reference ranges exist for  random urine specimens.  Correlate clinically.        Differential Method       eGFR if        eGFR if non        Eos #       Eosinophil%       Glucose       Glucose, UA Negative      Gran # (ANC)       Gran%       Hematocrit       Hemoglobin       Hyaline Casts, UA 9      Ketones, UA Negative      Leukocytes, UA Negative      Lymph #       Lymph%       Magnesium       MCH       MCHC       MCV       Microscopic Comment SEE COMMENT  Comment:  Other formed elements not mentioned in the report are not   present in the microscopic examination.         Mono #       Mono%       MPV       Nitrite, UA Negative      nRBC       Occult Blood UA 1+      Opiate Scrn, Ur Negative      pH, UA 5.0      Phosphorus       Platelets       POCT Glucose  167     Potassium       Protein, UA 2+  Comment:  Recommend a 24 hour urine protein or a urine   protein/creatinine ratio if globulin induced proteinuria is  clinically suspected.        RBC       RBC, UA 14      RDW       RPR Non-reactive      Sodium       Specific Gravity, UA 1.025      Specimen UA Urine, Clean Catch      Marijuana (THC) Metabolite Negative      Toxicology Information SEE COMMENT  Comment:  This screen includes the following classes of drugs at the   listed cut-off:  Benzodiazepines                  200 ng/ml  Methadone                        300 ng/ml  Cocaine metabolite               300 ng/ml  Opiates                           300 ng/ml  Barbiturates                     200 ng/ml  Amphetamines                    1000 ng/ml  Marijuana metabs (THC)            50 ng/ml  Phencyclidine (PCP)               25 ng/ml  High concentrations of Diphenhydramine may cross-react with  Phencyclidine PCP screening immunoassay giving a false   positive result.  High concentrations of Methylenedioxymethamphetamine (MDMA aka  Ectasy) and other structurally similar compounds may cross-   react with the Amphetamine/Methamphetamine screening   immunoassay giving a false positive result.  A metabolite of the anti-HIV drug Sustiva () may cause  false positive results in the Marijuana metabolite (THC)   screening assay.  Note: This exception list includes only more common   interferants in toxicology screen testing.  Because of many   cross-reactantspositive results on toxicology drug screens   should be confirmed whenever results do not correlate with   clinical presentation.  This report is intended for use in clinical monitoring and  management of patients. It is not intended for use in   employment related drug testing.  Because of any cross-reactants, positive results on toxicology  drug screens should be confirmed whenever results do not  correlate with clinical presentation.  Presumptive positive results are unconfirmed and may be used   only for medical purposes.        Urobilinogen, UA Negative      Vitamin B-12 251      WBC, UA 1      WBC           All pertinent lab results from the past 24 hours have been reviewed.    Significant Imaging: I have reviewed all pertinent imaging results/findings within the past 24 hours.

## 2018-10-16 NOTE — PLAN OF CARE
Problem: Patient Care Overview  Goal: Plan of Care Review  Outcome: Ongoing (interventions implemented as appropriate)  NO ACUTE EVENTS DURING SHIFT. PT TOLERATED ANY AND ALL ACTIVITIES WELL. PT VOIDING WITH NO DIFFICULTY, INCONTINENT AT TIMES. EEG MONITOR PLACED AND MONITORED.   ALL QUESTIONS ANSWERED. WILL ENDORSE CARE TO NOC RN

## 2018-10-17 ENCOUNTER — DOCUMENTATION ONLY (OUTPATIENT)
Dept: REHABILITATION | Facility: HOSPITAL | Age: 58
End: 2018-10-17

## 2018-10-17 PROBLEM — R26.89 BALANCE PROBLEMS: Status: RESOLVED | Noted: 2017-12-05 | Resolved: 2018-10-17

## 2018-10-17 PROBLEM — R29.6 FREQUENT FALLS: Status: RESOLVED | Noted: 2017-12-05 | Resolved: 2018-10-17

## 2018-10-17 PROBLEM — R53.1 LEFT-SIDED WEAKNESS: Status: RESOLVED | Noted: 2017-12-05 | Resolved: 2018-10-17

## 2018-10-17 LAB
ANION GAP SERPL CALC-SCNC: 10 MMOL/L
ANION GAP SERPL CALC-SCNC: 8 MMOL/L
BASOPHILS # BLD AUTO: 0 K/UL
BASOPHILS # BLD AUTO: 0.01 K/UL
BASOPHILS NFR BLD: 0 %
BASOPHILS NFR BLD: 0.1 %
BUN SERPL-MCNC: 26 MG/DL
BUN SERPL-MCNC: 26 MG/DL
CALCIUM SERPL-MCNC: 9.3 MG/DL
CALCIUM SERPL-MCNC: 9.5 MG/DL
CHLORIDE SERPL-SCNC: 105 MMOL/L
CHLORIDE SERPL-SCNC: 108 MMOL/L
CO2 SERPL-SCNC: 20 MMOL/L
CO2 SERPL-SCNC: 23 MMOL/L
CREAT SERPL-MCNC: 1.2 MG/DL
CREAT SERPL-MCNC: 1.4 MG/DL
DIFFERENTIAL METHOD: ABNORMAL
DIFFERENTIAL METHOD: ABNORMAL
EOSINOPHIL # BLD AUTO: 0 K/UL
EOSINOPHIL # BLD AUTO: 0 K/UL
EOSINOPHIL NFR BLD: 0 %
EOSINOPHIL NFR BLD: 0 %
ERYTHROCYTE [DISTWIDTH] IN BLOOD BY AUTOMATED COUNT: 14.6 %
ERYTHROCYTE [DISTWIDTH] IN BLOOD BY AUTOMATED COUNT: 14.7 %
EST. GFR  (AFRICAN AMERICAN): >60 ML/MIN/1.73 M^2
EST. GFR  (AFRICAN AMERICAN): >60 ML/MIN/1.73 M^2
EST. GFR  (NON AFRICAN AMERICAN): 54.2 ML/MIN/1.73 M^2
EST. GFR  (NON AFRICAN AMERICAN): >60 ML/MIN/1.73 M^2
GLUCOSE SERPL-MCNC: 312 MG/DL
GLUCOSE SERPL-MCNC: 469 MG/DL
HCT VFR BLD AUTO: 30.6 %
HCT VFR BLD AUTO: 31.3 %
HGB BLD-MCNC: 10.1 G/DL
HGB BLD-MCNC: 10.3 G/DL
HGB BLD-MCNC: 10.4 G/DL
IMM GRANULOCYTES # BLD AUTO: 0.02 K/UL
IMM GRANULOCYTES # BLD AUTO: 0.03 K/UL
IMM GRANULOCYTES NFR BLD AUTO: 0.2 %
IMM GRANULOCYTES NFR BLD AUTO: 0.4 %
LACTATE SERPL-SCNC: 2.7 MMOL/L
LACTATE SERPL-SCNC: 2.7 MMOL/L
LYMPHOCYTES # BLD AUTO: 0.3 K/UL
LYMPHOCYTES # BLD AUTO: 0.4 K/UL
LYMPHOCYTES NFR BLD: 3.9 %
LYMPHOCYTES NFR BLD: 4.3 %
MAGNESIUM SERPL-MCNC: 1.9 MG/DL
MCH RBC QN AUTO: 29 PG
MCH RBC QN AUTO: 29.2 PG
MCHC RBC AUTO-ENTMCNC: 32.9 G/DL
MCHC RBC AUTO-ENTMCNC: 34 G/DL
MCV RBC AUTO: 86 FL
MCV RBC AUTO: 88 FL
MONOCYTES # BLD AUTO: 0.3 K/UL
MONOCYTES # BLD AUTO: 0.4 K/UL
MONOCYTES NFR BLD: 3.3 %
MONOCYTES NFR BLD: 4.3 %
NEUTROPHILS # BLD AUTO: 7.4 K/UL
NEUTROPHILS # BLD AUTO: 8.2 K/UL
NEUTROPHILS NFR BLD: 91 %
NEUTROPHILS NFR BLD: 92.5 %
NRBC BLD-RTO: 0 /100 WBC
NRBC BLD-RTO: 0 /100 WBC
PHOSPHATE SERPL-MCNC: 2.9 MG/DL
PLATELET # BLD AUTO: 175 K/UL
PLATELET # BLD AUTO: 181 K/UL
PMV BLD AUTO: 9.4 FL
PMV BLD AUTO: 9.8 FL
POCT GLUCOSE: 230 MG/DL (ref 70–110)
POCT GLUCOSE: 294 MG/DL (ref 70–110)
POCT GLUCOSE: 317 MG/DL (ref 70–110)
POCT GLUCOSE: 365 MG/DL (ref 70–110)
POCT GLUCOSE: 408 MG/DL (ref 70–110)
POCT GLUCOSE: 458 MG/DL (ref 70–110)
POTASSIUM SERPL-SCNC: 4 MMOL/L
POTASSIUM SERPL-SCNC: 4.6 MMOL/L
RBC # BLD AUTO: 3.55 M/UL
RBC # BLD AUTO: 3.56 M/UL
SODIUM SERPL-SCNC: 135 MMOL/L
SODIUM SERPL-SCNC: 139 MMOL/L
WBC # BLD AUTO: 8.14 K/UL
WBC # BLD AUTO: 8.82 K/UL

## 2018-10-17 PROCEDURE — 25000003 PHARM REV CODE 250: Performed by: HOSPITALIST

## 2018-10-17 PROCEDURE — 85025 COMPLETE CBC W/AUTO DIFF WBC: CPT | Mod: 91

## 2018-10-17 PROCEDURE — 83735 ASSAY OF MAGNESIUM: CPT

## 2018-10-17 PROCEDURE — 85018 HEMOGLOBIN: CPT

## 2018-10-17 PROCEDURE — 99233 SBSQ HOSP IP/OBS HIGH 50: CPT | Mod: ,,, | Performed by: PSYCHIATRY & NEUROLOGY

## 2018-10-17 PROCEDURE — 87040 BLOOD CULTURE FOR BACTERIA: CPT

## 2018-10-17 PROCEDURE — 63600175 PHARM REV CODE 636 W HCPCS: Performed by: HOSPITALIST

## 2018-10-17 PROCEDURE — 20600001 HC STEP DOWN PRIVATE ROOM

## 2018-10-17 PROCEDURE — 97161 PT EVAL LOW COMPLEX 20 MIN: CPT

## 2018-10-17 PROCEDURE — 63600175 PHARM REV CODE 636 W HCPCS: Performed by: PSYCHIATRY & NEUROLOGY

## 2018-10-17 PROCEDURE — 97165 OT EVAL LOW COMPLEX 30 MIN: CPT

## 2018-10-17 PROCEDURE — 83605 ASSAY OF LACTIC ACID: CPT

## 2018-10-17 PROCEDURE — 36415 COLL VENOUS BLD VENIPUNCTURE: CPT

## 2018-10-17 PROCEDURE — 80048 BASIC METABOLIC PNL TOTAL CA: CPT

## 2018-10-17 PROCEDURE — 99232 SBSQ HOSP IP/OBS MODERATE 35: CPT | Mod: ,,, | Performed by: HOSPITALIST

## 2018-10-17 PROCEDURE — 84100 ASSAY OF PHOSPHORUS: CPT

## 2018-10-17 PROCEDURE — 80048 BASIC METABOLIC PNL TOTAL CA: CPT | Mod: 91

## 2018-10-17 PROCEDURE — 25000003 PHARM REV CODE 250: Performed by: PSYCHIATRY & NEUROLOGY

## 2018-10-17 PROCEDURE — 99223 1ST HOSP IP/OBS HIGH 75: CPT | Mod: ,,, | Performed by: PSYCHIATRY & NEUROLOGY

## 2018-10-17 RX ORDER — INSULIN ASPART 100 [IU]/ML
1-10 INJECTION, SOLUTION INTRAVENOUS; SUBCUTANEOUS EVERY 4 HOURS
Status: DISCONTINUED | OUTPATIENT
Start: 2018-10-17 | End: 2018-10-17

## 2018-10-17 RX ORDER — SODIUM CHLORIDE 450 MG/100ML
INJECTION, SOLUTION INTRAVENOUS ONCE
Status: COMPLETED | OUTPATIENT
Start: 2018-10-17 | End: 2018-10-17

## 2018-10-17 RX ORDER — INSULIN ASPART 100 [IU]/ML
20 INJECTION, SOLUTION INTRAVENOUS; SUBCUTANEOUS
Status: DISCONTINUED | OUTPATIENT
Start: 2018-10-18 | End: 2018-10-18

## 2018-10-17 RX ORDER — ONDANSETRON HCL IN 0.9 % NACL 8 MG/50 ML
8 INTRAVENOUS SOLUTION, PIGGYBACK (ML) INTRAVENOUS
Status: DISCONTINUED | OUTPATIENT
Start: 2018-10-17 | End: 2018-10-17

## 2018-10-17 RX ORDER — HEPARIN 100 UNIT/ML
500 SYRINGE INTRAVENOUS
Status: CANCELLED | OUTPATIENT
Start: 2018-10-17

## 2018-10-17 RX ORDER — ONDANSETRON 2 MG/ML
8 INJECTION INTRAMUSCULAR; INTRAVENOUS ONCE
Status: COMPLETED | OUTPATIENT
Start: 2018-10-17 | End: 2018-10-17

## 2018-10-17 RX ORDER — DEXTROSE MONOHYDRATE AND SODIUM CHLORIDE 5; .45 G/100ML; G/100ML
INJECTION, SOLUTION INTRAVENOUS CONTINUOUS
Status: CANCELLED | OUTPATIENT
Start: 2018-10-17

## 2018-10-17 RX ORDER — SODIUM CHLORIDE 0.9 % (FLUSH) 0.9 %
10 SYRINGE (ML) INJECTION
Status: CANCELLED | OUTPATIENT
Start: 2018-10-17

## 2018-10-17 RX ORDER — SODIUM CHLORIDE 0.9 % (FLUSH) 0.9 %
10 SYRINGE (ML) INJECTION
Status: DISCONTINUED | OUTPATIENT
Start: 2018-10-17 | End: 2018-10-20 | Stop reason: HOSPADM

## 2018-10-17 RX ORDER — PROCHLORPERAZINE MALEATE 5 MG
10 TABLET ORAL
Status: CANCELLED | OUTPATIENT
Start: 2018-10-17

## 2018-10-17 RX ORDER — PROCHLORPERAZINE MALEATE 5 MG
10 TABLET ORAL
Status: COMPLETED | OUTPATIENT
Start: 2018-10-17 | End: 2018-10-17

## 2018-10-17 RX ORDER — INSULIN ASPART 100 [IU]/ML
1-10 INJECTION, SOLUTION INTRAVENOUS; SUBCUTANEOUS
Status: DISCONTINUED | OUTPATIENT
Start: 2018-10-17 | End: 2018-10-20 | Stop reason: HOSPADM

## 2018-10-17 RX ORDER — ONDANSETRON HCL IN 0.9 % NACL 8 MG/50 ML
8 INTRAVENOUS SOLUTION, PIGGYBACK (ML) INTRAVENOUS
Status: CANCELLED | OUTPATIENT
Start: 2018-10-17

## 2018-10-17 RX ORDER — INSULIN ASPART 100 [IU]/ML
15 INJECTION, SOLUTION INTRAVENOUS; SUBCUTANEOUS
Status: DISCONTINUED | OUTPATIENT
Start: 2018-10-17 | End: 2018-10-17

## 2018-10-17 RX ORDER — HEPARIN 100 UNIT/ML
500 SYRINGE INTRAVENOUS
Status: DISCONTINUED | OUTPATIENT
Start: 2018-10-17 | End: 2018-10-20 | Stop reason: HOSPADM

## 2018-10-17 RX ORDER — SODIUM CHLORIDE 450 MG/100ML
INJECTION, SOLUTION INTRAVENOUS ONCE
Status: CANCELLED
Start: 2018-10-17 | End: 2018-10-17

## 2018-10-17 RX ORDER — SODIUM CHLORIDE 9 MG/ML
INJECTION, SOLUTION INTRAVENOUS CONTINUOUS
Status: CANCELLED
Start: 2018-10-17

## 2018-10-17 RX ADMIN — ATENOLOL 50 MG: 25 TABLET ORAL at 09:10

## 2018-10-17 RX ADMIN — ASPIRIN 81 MG: 81 TABLET, COATED ORAL at 09:10

## 2018-10-17 RX ADMIN — ONDANSETRON 8 MG: 2 INJECTION INTRAMUSCULAR; INTRAVENOUS at 05:10

## 2018-10-17 RX ADMIN — PROCHLORPERAZINE MALEATE 10 MG: 5 TABLET, FILM COATED ORAL at 05:10

## 2018-10-17 RX ADMIN — IRBESARTAN 300 MG: 150 TABLET ORAL at 09:10

## 2018-10-17 RX ADMIN — AZATHIOPRINE 150 MG: 50 TABLET ORAL at 09:10

## 2018-10-17 RX ADMIN — INSULIN ASPART 4 UNITS: 100 INJECTION, SOLUTION INTRAVENOUS; SUBCUTANEOUS at 05:10

## 2018-10-17 RX ADMIN — INSULIN ASPART 8 UNITS: 100 INJECTION, SOLUTION INTRAVENOUS; SUBCUTANEOUS at 01:10

## 2018-10-17 RX ADMIN — INSULIN ASPART 5 UNITS: 100 INJECTION, SOLUTION INTRAVENOUS; SUBCUTANEOUS at 06:10

## 2018-10-17 RX ADMIN — DILTIAZEM HYDROCHLORIDE 240 MG: 240 CAPSULE, EXTENDED RELEASE ORAL at 09:10

## 2018-10-17 RX ADMIN — INSULIN ASPART 10 UNITS: 100 INJECTION, SOLUTION INTRAVENOUS; SUBCUTANEOUS at 09:10

## 2018-10-17 RX ADMIN — INSULIN ASPART 15 UNITS: 100 INJECTION, SOLUTION INTRAVENOUS; SUBCUTANEOUS at 05:10

## 2018-10-17 RX ADMIN — VITAMIN D, TAB 1000IU (100/BT) 5000 UNITS: 25 TAB at 09:10

## 2018-10-17 RX ADMIN — SODIUM CHLORIDE: 0.45 INJECTION, SOLUTION INTRAVENOUS at 05:10

## 2018-10-17 RX ADMIN — INSULIN ASPART 15 UNITS: 100 INJECTION, SOLUTION INTRAVENOUS; SUBCUTANEOUS at 01:10

## 2018-10-17 RX ADMIN — SODIUM CHLORIDE, SODIUM LACTATE, POTASSIUM CHLORIDE, AND CALCIUM CHLORIDE 1000 ML: .6; .31; .03; .02 INJECTION, SOLUTION INTRAVENOUS at 06:10

## 2018-10-17 RX ADMIN — INSULIN ASPART 15 UNITS: 100 INJECTION, SOLUTION INTRAVENOUS; SUBCUTANEOUS at 09:10

## 2018-10-17 RX ADMIN — INSULIN ASPART 5 UNITS: 100 INJECTION, SOLUTION INTRAVENOUS; SUBCUTANEOUS at 10:10

## 2018-10-17 RX ADMIN — ATORVASTATIN CALCIUM 40 MG: 20 TABLET, FILM COATED ORAL at 09:10

## 2018-10-17 RX ADMIN — INSULIN DETEMIR 40 UNITS: 100 INJECTION, SOLUTION SUBCUTANEOUS at 09:10

## 2018-10-17 NOTE — NURSING
AM assesment completed. Patient awake and alert. Oriented x 3, confused at time. Wife at bedside. No distress noted. No pain. IV intact. VSS. Tele monitor, NSR Will continue to monitor.

## 2018-10-17 NOTE — ASSESSMENT & PLAN NOTE
- Became delirious over the course of the day (10/17).  - Stroke called called. MRI brain was unremarkable for any acute ischemic event but he does have white matter changes.  - Post the 2 doses of steroids, his BG spiked and he also been dehydrated  - Will delay Cytoxan infusion

## 2018-10-17 NOTE — ASSESSMENT & PLAN NOTE
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 Has been on Imuran since the past 2-3 weeks. Per the wife, has had some personality changes with increased lethargy, confusion since switching over to Imuran. MRI brain done on 9/14 shows supratentorial and infratentorial white matter changes with superimposed infarcts unchanged from prior imaging on 4/2.    - On exam, he is A&O x3, recal is 1/3+0 with prompting, Luria intact, unable to do serial 7s or count the months backwards. No motor/sensory deficits. DTRS 1+ in the lower extremities. Vibration and proprioception intact. No cerebellar signs.  - CT head shows patchy and confluent hypoattenuation in the deep cerebral and periventricular white matter in keeping with chronic microvascular ischemic change with superimposed remote lacunar type infarcts    Assessment and Recommendations:  - Was alert on AM exam but became more disoriented as the day progressed. By the afternoon, patient was lethargic and not making any conversation.  - Received 2 doses of pulse steroids. 3rd dose held.  - After speaking with Dr. Love, planned to restart the patient on Cytoxan. Will receive the first dose on 10/18; will delay by one day given his acute confusional state.  -Does not need the 3rd dose of steroids.

## 2018-10-17 NOTE — SUBJECTIVE & OBJECTIVE
Subjective:     Interval History: Patient has reportedly been paranoid and talking inappropriately since yesterday. Makes conversation that does not make sense.  Vitals stable.    Current Neurological Medications:     Current Facility-Administered Medications   Medication Dose Route Frequency Provider Last Rate Last Dose    acetaminophen tablet 650 mg  650 mg Oral Q4H PRN Teresita Horton MD        alteplase injection 2 mg  2 mg Intra-Catheter PRN Shana Love MD        aspirin EC tablet 81 mg  81 mg Oral Daily Teresita Horton MD   81 mg at 10/17/18 0944    atenolol tablet 50 mg  50 mg Oral Daily Teresita Horton MD   50 mg at 10/17/18 0944    atorvastatin tablet 40 mg  40 mg Oral Daily Teresita Horton MD   40 mg at 10/17/18 0944    azaTHIOprine tablet 150 mg  150 mg Oral Daily Teresita Horton MD   150 mg at 10/17/18 0943    cyclophosphamide (CYTOXAN) 750 mg/m2 = 1,675 mg in sodium chloride 0.9% 250 mL chemo infusion  750 mg/m2 Intravenous 1 time in Clinic/HOD Shana Love MD        dextrose 50% injection 12.5 g  12.5 g Intravenous PRN Teresita Horton MD        dextrose 50% injection 25 g  25 g Intravenous PRN Teresita Horton MD        diltiaZEM 24 hr capsule 240 mg  240 mg Oral Daily Teresita Horton MD   240 mg at 10/17/18 0944    enoxaparin injection 40 mg  40 mg Subcutaneous Daily Jayesh Allen MD        glucagon (human recombinant) injection 1 mg  1 mg Intramuscular PRN Teresita Horton MD        glucose chewable tablet 16 g  16 g Oral PRN Teresita Horton MD        glucose chewable tablet 24 g  24 g Oral PRN Teresita Horton MD        heparin, porcine (PF) 100 unit/mL injection flush 500 Units  500 Units Intravenous PRN Shana Love MD        insulin aspart U-100 pen 1-10 Units  1-10 Units Subcutaneous QID (AC + HS) PRN Jayesh Allen MD   4 Units at 10/17/18 1750    insulin aspart U-100 pen 15 Units  15 Units Subcutaneous TID WM Jayesh Allen MD   15 Units at  10/17/18 1749    insulin detemir U-100 pen 30 Units  30 Units Subcutaneous BID Jayesh Allen MD   30 Units at 10/17/18 0944    irbesartan tablet 300 mg  300 mg Oral QHS Teresita LUBNA Horton MD   300 mg at 10/16/18 2106    lactated ringers bolus 1,000 mL  1,000 mL Intravenous Once Jayesh Allen MD   1,000 mL at 10/17/18 1800    mesna (MESNEX) 725 mg in sodium chloride 0.9% 50 mL infusion  325 mg/m2 Intravenous 1 time in Clinic/HOD Shana Love MD        mesna (MESNEX) 725 mg in sodium chloride 0.9% 50 mL infusion  325 mg/m2 Intravenous 1 time in Clinic/HOD Shana Love MD        ondansetron disintegrating tablet 8 mg  8 mg Oral Q8H PRN Teresita Horton MD        sodium chloride 0.9% flush 10 mL  10 mL Intravenous PRN Shana Love MD        sodium chloride 0.9% flush 5 mL  5 mL Intravenous PRN Teresita LUBNA Horton MD        vitamin D 1000 units tablet 5,000 Units  5,000 Units Oral Daily Teresita LUBNA Horton MD   5,000 Units at 10/17/18 0943       Review of Systems   Unable to perform ROS: Other     Objective:     Vital Signs (Most Recent):  Temp: 98.3 °F (36.8 °C) (10/17/18 1548)  Pulse: 72 (10/17/18 1655)  Resp: 15 (10/17/18 1549)  BP: (!) 153/82 (10/17/18 1549)  SpO2: 97 % (10/17/18 1549) Vital Signs (24h Range):  Temp:  [96.2 °F (35.7 °C)-98.5 °F (36.9 °C)] 98.3 °F (36.8 °C)  Pulse:  [72-93] 72  Resp:  [15-18] 15  SpO2:  [96 %-100 %] 97 %  BP: (116-217)/(60-88) 153/82     Weight: 102.4 kg (225 lb 12.8 oz)  Body mass index is 33.34 kg/m².    Physical Exam   Constitutional: No distress.   Eyes: EOM are normal. Pupils are equal, round, and reactive to light.   Neurological: He is alert.   Reflex Scores:       Bicep reflexes are 2+ on the right side and 2+ on the left side.       Brachioradialis reflexes are 2+ on the right side and 2+ on the left side.       Patellar reflexes are 2+ on the right side and 2+ on the left side.       Achilles reflexes are 2+ on the right side and 2+ on the left  side.  Psychiatric: His speech is normal.   Nursing note and vitals reviewed.      NEUROLOGICAL EXAMINATION:     MENTAL STATUS   Oriented to person.   Oriented to place.   Disoriented to time.   Speech: speech is normal   Level of consciousness: alert    CRANIAL NERVES     CN III, IV, VI   Pupils are equal, round, and reactive to light.  Extraocular motions are normal.     CN VII   Facial expression full, symmetric.     MOTOR EXAM   Overall muscle tone: normal    Strength   Right deltoid: 5/5  Left deltoid: 5/5  Right peroneal: 5/5  Left peroneal: 5/5  Right gastroc: 5/5  Left gastroc: 5/5    REFLEXES     Reflexes   Right brachioradialis: 2+  Left brachioradialis: 2+  Right biceps: 2+  Left biceps: 2+  Right patellar: 2+  Left patellar: 2+  Right achilles: 2+  Left achilles: 2+    GAIT AND COORDINATION     Tremor   Resting tremor: absent      Significant Labs:   Recent Lab Results       10/17/18  1425 10/17/18  1359 10/17/18  1334 10/17/18  0912 10/17/18  0418      Immature Granulocytes 0.4    0.2     Immature Grans (Abs) 0.03  Comment:  Mild elevation in immature granulocytes is non specific and   can be seen in a variety of conditions including stress response,   acute inflammation, trauma and pregnancy. Correlation with other   laboratory and clinical findings is essential.      0.02  Comment:  Mild elevation in immature granulocytes is non specific and   can be seen in a variety of conditions including stress response,   acute inflammation, trauma and pregnancy. Correlation with other   laboratory and clinical findings is essential.       Anion Gap 8         Baso # 0.00    0.01     Basophil% 0.0    0.1     BUN, Bld 26         Calcium 9.5         Chloride 108         CO2 23         Creatinine 1.2         Differential Method Automated    Automated     eGFR if  >60.0         eGFR if non  >60.0  Comment:  Calculation used to obtain the estimated glomerular filtration  rate (eGFR) is  the CKD-EPI equation.            Eos # 0.0    0.0     Eosinophil% 0.0    0.0     Glucose 312         Gran # (ANC) 7.4    8.2     Gran% 91.0    92.5     Hematocrit 30.6    31.3     Hemoglobin 10.4    10.3     Lactate, Jonatan 2.7  Comment:  Falsely low lactic acid results can be found in samples   containing >=13.0 mg/dL total bilirubin and/or >=3.5 mg/dL   direct bilirubin.           Lymph # 0.4    0.3     Lymph% 4.3    3.9     Magnesium          MCH 29.2    29.0     MCHC 34.0    32.9     MCV 86    88     Mono # 0.4    0.3     Mono% 4.3    3.3     MPV 9.4    9.8     nRBC 0    0     Phosphorus          Platelets 175    181     POCT Glucose  294 317 408      Potassium 4.0         RBC 3.56    3.55     RDW 14.6    14.7     Sodium 139         WBC 8.14    8.82                 10/17/18  0417 10/16/18  2341 10/16/18  2032      Immature Granulocytes        Immature Grans (Abs)        Anion Gap 10       Baso #        Basophil%        BUN, Bld 26       Calcium 9.3       Chloride 105       CO2 20       Creatinine 1.4       Differential Method        eGFR if  >60.0       eGFR if non  54.2  Comment:  Calculation used to obtain the estimated glomerular filtration  rate (eGFR) is the CKD-EPI equation.          Eos #        Eosinophil%        Glucose 469  Comment:  *Critical value -   Results called to and read back by:VERNON MAN RN       Gran # (ANC)        Gran%        Hematocrit        Hemoglobin        Lactate, Jonatan        Lymph #        Lymph%        Magnesium 1.9       MCH        MCHC        MCV        Mono #        Mono%        MPV        nRBC        Phosphorus 2.9       Platelets        POCT Glucose  367 481     Potassium 4.6       RBC        RDW        Sodium 135       WBC            All pertinent lab results from the past 24 hours have been reviewed.    Significant Imaging: I have reviewed all pertinent imaging results/findings within the past 24 hours.

## 2018-10-17 NOTE — PROGRESS NOTES
Pt was evaluated on 12/1/18 and was seen 10 times for PT. Pt elected to d/c PT and continue SLP follow ups.    Tami MCKEONT

## 2018-10-17 NOTE — HOSPITAL COURSE
10/15: Presented to [facility], admit to hospital medicine  10/17: Stroke code for acute onset aphasia

## 2018-10-17 NOTE — PT/OT/SLP EVAL
Occupational Therapy   Evaluation and Discharge Note    Name: Bessy Nunez  MRN: [id number]  Admitting Diagnosis:  Acute confusional state      Recommendations:     Discharge Recommendations: home health PT, home health OT  Discharge Equipment Recommendations:  none  Barriers to discharge:  None    History:     Occupational Profile:  Living Environment: [facility] with [facility], BR down, L side HR's  Previous level of function: has spvn/setup-assist and assist as needed with All ADL's; assist in/out of tub  Roles and Routines:   Equipment Owned:  rollator, bath bench  Assistance upon Discharge: yes    Past Medical History:   Diagnosis Date    Amblyopia     Cataract     CNS vasculitis 6/2/2017    Follows with Dr. Love By mri.  Several active lesions, many old. 5/13: MRA brain/neck, MRI brain w/wo contrast show no vascular occlusion, multiple foci of hyperintensity in deep cerebral periventricular white matter in pattern of demyelinating process.  5/17: MRI spine performed, no spinal cord lesions. Hospitalization 6/2017. EEG was performed negative for seizures.  Repeat MRI showing new lesion, question whether this was demyelination versus CVA. 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 Findings compatible with a small acute lacunar infarct adjacent to the left frontal horn.  Nonspecific enhancement just inferior to this region and extending into the left basal ganglia, as well as within the inferior aspect of the left cerebellum.  No evidence of a focal mass. 2.  Extensive increased signal intensity involving the periventricular white matter compatible with demyelinating disease versus vasculitis. 3.  Clinical correlation and followup recommended. 4.  This reportedly flattened in the EPIC  "and the corpus callosum medical record system.    Mixed hyperlipidemia 11/9/2012    NAFLD (nonalcoholic fatty liver disease) 10/11/2013    CHASE (nonalcoholic steatohepatitis)     Obesity     Stroke     Type 2 diabetes mellitus with diabetic polyneuropathy, with long-term current use of insulin 5/14/2017    Type 2 diabetes mellitus with hyperglycemia, with long-term current use of insulin 10/11/2013       Past Surgical History:   Procedure Laterality Date    BIOPSY LIVER AND ULTRASOUND N/A 6/9/2015    Performed by [facility] OR 2ND FLR    COLONOSCOPY N/A 2/21/2014    Performed by [facility] at [facility] ENDO    ESOPHAGOGASTRODUODENOSCOPY (EGD) N/A 5/29/2017    Performed by Hai Carter MD at [facility] ENDO (2ND FLR)    SKIN CANCER EXCISION         Subjective     Chief Complaint: none stated  Patient/Family stated goals: "i'm fine"  Communicated with: nsg prior to session.  Pain/Comfort:  · Pain Rating 1: 0/10    Patients cultural, spiritual, Mosque conflicts given the current situation: none    Objective:     Patient found with: peripheral IV    General Precautions: Standard, fall   Orthopedic Precautions:    Braces:       Occupational Performance:    Bed Mobility:    · Sup to sit with SBA    Functional Mobility/Transfers:  · Sit to stand with SBA  · Functional Mobility: ambulated with RW with CGA, cues on turns as pt gets distracted easily, community distance ambulation on unit    Activities of Daily Living:  · Donned UE dress with gown with setup/cues  · Setup/spvn socks      Cognitive/Visual Perceptual:  Follows basic commands, min cues for safety with mobility, some decreased insight/safety awarenss and decreased memory/recall noted    Physical Exam:  wfl BUe's    Patient left EOB with call button in reach and wife present    [facility] 6 Click:  [facility] Total Score: 19    Treatment & Education:  -Pt edu on OT role/POC  -Importance of OOB activity with staff assistance  -Safety " "during functional t/f and mobility   - White board updated  - Multiple self care tasks completed-- assistance level noted above  - All questions/concerns answered within OT scope of practice           Education:    Assessment:     Bessy Nunez is a 58 y.o. male with a medical diagnosis of Acute confusional state. At this time, patient is functioning at their prior level of function and does not require further acute OT services.     Clinical Decision Makin.  OT Low:  "Pt evaluation falls under low complexity for evaluation coding due to performance deficits noted in 1-3 areas as stated above and 0 co-morbities affecting current functional status. Data obtained from problem focused assessments. No modifications or assistance was required for completion of evaluation. Only brief occupational profile and history review completed."     Plan:     During this hospitalization, patient does not require further acute OT services.  Please re-consult if situation changes.    · Plan of Care Reviewed with: patient, spouse    This Plan of care has been discussed with the patient who was involved in its development and understands and is in agreement with the identified goals and treatment plan    GOALS:   Multidisciplinary Problems     Occupational Therapy Goals     Not on file          Multidisciplinary Problems (Resolved)        Problem: Occupational Therapy Goal    Goal Priority Disciplines Outcome Interventions   Occupational Therapy Goal   (Resolved)     OT, PT/OT Outcome(s) achieved                    Time Tracking:     OT Date of Treatment: 10/17/18  OT Start Time: 905  OT Stop Time: 921  OT Total Time (min): 16 min    Billable Minutes:Evaluation 16 min    Brina Hernandez OT  10/17/2018    "

## 2018-10-17 NOTE — CONSULTS
[facility]  Vascular Neurology  Comprehensive Stroke Center  Consult Note    Inpatient consult to Vascular (Stroke) Neurology  Consult performed by: Reynaldo Carter MD  Consult ordered by: Jayesh Allen MD        Assessment/Plan:     Patient is a 58 y.o. year old male with:    Aphasia    Assessment  Mr. Nunez is a 59 yo male w/ PMH significant for CNS vasculitis (dx 2017, followed by Dr. Love) DM2, HTN, HLD, NAFLD who presented to [facility] on 10/15 after fall.  Stroke code called on 10/17 afternoon for new-onset aphasia.    By the time returned from MRI, pt beginning to verbalize, aphasia improving.  MRI without acute infarction.  Hyperglycemic on exam.    Differential diagnoses include: Encephalopathy 2/2 metabolic (hyperglycemia), steroids.    Recommendations & Plan:  -No acute infarct on imaging.  -F/u repeat labs  -Control glucose  -Gen neuro recommending return to cytoxan  -F/u EEG (10/15, report not complete)  -Discussed with gen neurology and primary team    Vascular neurology will sign off.  Please call for any questions/concerns.          Acute metabolic encephalopathy    Likely significant contributor to transient aphasia as above        Acquired immunocompromised state    Cytoxan, azathioprine for CNS vasculitis.        CNS vasculitis    On pulse dose steroids.  Gen neuro recommending return to cytoxan.        Essential hypertension    Stroke risk factor        Mixed hyperlipidemia    Stroke risk factor  -continue atorvastatin            STROKE DOCUMENTATION     Acute Stroke Times   Last Known Normal Date: 10/17/18  Last Known Normal Time: 1200  Symptom Onset Date: 10/17/18  Symptom Onset Time: 1330  Stroke Team Called Date: 10/17/18  Stroke Team Called Time: 1347  Stroke Team Arrival Date: 10/17/18  Stroke Team Arrival Time: 1351    NIH Scale:  Interval: baseline (upon arrival/admit)  1a. Level Of Consciousness: 0-->Alert: keenly responsive  1b. LOC Questions: 2-->Answers neither  question correctly  1c. LOC Commands: 1-->Performs one task correctly  2. Best Gaze: 0-->Normal  3. Visual: 0-->No visual loss  4. Facial Palsy: 0-->Normal symmetrical movements  5a. Motor Arm, Left: 0-->No drift: limb holds 90 (or 45) degrees for full 10 secs  5b. Motor Arm, Right: 0-->No drift: limb holds 90 (or 45) degrees for full 10 secs  6a. Motor Leg, Left: 0-->No drift: leg holds 30 degree position for full 5 secs  6b. Motor Leg, Right: 0-->No drift: leg holds 30 degree position for full 5 secs  7. Limb Ataxia: 0-->Absent  8. Sensory: 0-->Normal: no sensory loss  9. Best Language: 2-->Severe aphasia: all communication is through fragmentary expression: great need for inference, questioning, and guessing by the listener. Range of information that can be exchanged is limited: listener carries burden of. . . (see row details)  10. Dysarthria: 2-->Severe dysarthria: patients speech is so slurred as to be unintelligible in the absence of or out of proportion to any dysphasia, or is mute/anarthric  11. Extinction and Inattention (formerly Neglect): 0-->No abnormality  Total (NIH Stroke Scale): 7    Modified Sumner Score: 3  Jarod Coma Scale:12   ABCD2 Score:    [id number] Score:   HAS -BLED Score:   ICH Score:   Hunt & Wilson Classification:       Thrombolysis Candidate? No, no acute infarct on MRI      Interventional Revascularization Candidate?   Is the patient eligible for mechanical endovascular reperfusion (MELINDA)?  No LVO, no acute infarct on MRI      Hemorrhagic change of an Ischemic Stroke: Does this patient have an ischemic stroke with hemorrhagic changes? No     Subjective:     History of Present Illness:  Mr. Nunez is a 57 yo male w/ PMH significant for CNS vasculitis (dx 2017, followed by Dr. Love) DM2, HTN, HLD, NAFLD who presented to [facility] on 10/15 after fall.  Stroke code called on 10/17 afternoon for new-onset aphasia.    Pt had previously been on cytoxan for management of vasculitis, but was  recently switched to azathioprine x2-3 weeks.  Family reports 2-3 weeks of gradual decline in mental status (increased lethargy, confusion), but no prior episodes of aphasia.  General neurology consulted and recommended pulse steroids x3 days.  Since admission, has been hyperglycemic.      Vascular neurology consulted for acute onset aphasia on 10/17.  He is able to ambulate without assistance, but requires help from family for ADLs.  On ASA 81 and atorvastatin 40 Qdaily at baseline.          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 Love By mri.  Several active lesions, many old. 5/13: MRA brain/neck, MRI brain w/wo contrast show no vascular occlusion, multiple foci of hyperintensity in deep cerebral periventricular white matter in pattern of demyelinating process.  5/17: MRI spine performed, no spinal cord lesions. Hospitalization 6/2017. EEG was performed negative for seizures.  Repeat MRI showing new lesion, question whether this was demyelination versus CVA. 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 Findings compatible with a small acute lacunar infarct adjacent to the left frontal horn.  Nonspecific enhancement just inferior to this region and extending into the left basal ganglia, as well as within the inferior aspect of the left cerebellum.  No evidence of a focal mass. 2.  Extensive increased signal intensity involving the periventricular white matter compatible with demyelinating disease versus vasculitis. 3.  Clinical correlation and followup recommended. 4.  This reportedly flattened in the EPIC and the corpus callosum medical record system.    Mixed hyperlipidemia 11/9/2012    NAFLD (nonalcoholic fatty liver  disease) 10/11/2013    CHASE (nonalcoholic steatohepatitis)     Obesity     Stroke     Type 2 diabetes mellitus with diabetic polyneuropathy, with long-term current use of insulin 5/14/2017    Type 2 diabetes mellitus with hyperglycemia, with long-term current use of insulin 10/11/2013     Past Surgical History:   Procedure Laterality Date    BIOPSY LIVER AND ULTRASOUND N/A 6/9/2015    Performed by [facility] Provider at [facility] OR 2ND FLR    COLONOSCOPY N/A 2/21/2014    [facility] ENDO    ESOPHAGOGASTRODUODENOSCOPY (EGD) N/A 5/29/2017    Performed by Hai Carter MD at [facility] ENDO (2ND FLR)    SKIN CANCER EXCISION       Family History   Problem Relation Age of Onset    Heart disease Mother     Hypertension Mother     Heart failure Mother     Cataracts Mother     Cancer Father         lung    Diabetes Maternal Aunt     Stroke Sister     Rheum arthritis Paternal Grandmother     Amblyopia Neg Hx     Blindness Neg Hx     Glaucoma Neg Hx     Macular degeneration Neg Hx     Retinal detachment Neg Hx     Strabismus Neg Hx      Social History     Tobacco Use    Smoking status: Never Smoker    Smokeless tobacco: Never Used   Substance Use Topics    Alcohol use: No     Alcohol/week: 0.0 oz    Drug use: No     Review of patient's allergies indicates:  No Known Allergies    Medications: I have reviewed the current medication administration record.    Medications Prior to Admission   Medication Sig Dispense Refill Last Dose    alclomethasone (ACLOVATE) 0.05 % cream    10/15/2018 at Unknown time    aspirin (ECOTRIN) 81 MG EC tablet Take 81 mg by mouth once daily.   10/14/2018 at Unknown time    atenolol (TENORMIN) 50 MG tablet Take 1 tablet (50 mg total) by mouth once daily. 90 tablet 3 10/14/2018 at Unknown time    atorvastatin (LIPITOR) 40 MG tablet Take 1 tablet (40 mg total) by mouth once daily. 90 tablet 3 10/14/2018 at Unknown time    azaTHIOprine (IMURAN) 50  mg Tab Take 50mg (1 tab ) daily x5 days, then 100mg (2 tabs) daily x5 days, then 150mg daily (3 tabs thereafter) 90 tablet 3 10/14/2018 at Unknown time    blood sugar diagnostic (TRUE METRIX GLUCOSE TEST STRIP) Strp Test blood sugar four times a day 100 each 11 10/14/2018 at Unknown time    blood sugar diagnostic Strp To check BG 4 times daily, to use with insurance preferred meter 400 each 3 10/14/2018 at Unknown time    calcium carbonate (OS-DONNA) 500 mg calcium (1,250 mg) tablet Take 2 tablets (1,000 mg total) by mouth once.  0 10/14/2018 at Unknown time    diltiaZEM (DILACOR XR) 240 MG CDCR Take 1 capsule (240 mg total) by mouth once daily. 90 capsule 3 10/14/2018 at Unknown time    insulin aspart U-100 (NOVOLOG) 100 unit/mL InPn pen Inject 20 Units into the skin 3 (three) times daily with meals. 18 mL 11 10/15/2018 at Unknown time    insulin glargine (BASAGLAR KWIKPEN U-100 INSULIN) 100 unit/mL (3 mL) InPn pen Inject 40 Units into the skin 2 (two) times daily. 2 Box 11 10/15/2018 at Unknown time    insulin syringe-needle U-100 (INSULIN SYRINGE) 1/2 mL 30 gauge x 5/16 Syrg Use 3x/day 300 each 3 10/15/2018 at Unknown time    insulin syringe-needle U-100 0.5 mL 31 gauge x 5/16 Syrg    10/15/2018 at Unknown time    irbesartan (AVAPRO) 300 MG tablet Take 1 tablet (300 mg total) by mouth every evening. 90 tablet 3 10/14/2018 at Unknown time    ketoconazole (NIZORAL) 2 % cream    10/14/2018 at Unknown time    lancets 30 gauge Misc Test blood sugar four times a day 100 each 11 10/14/2018 at Unknown time    lancets Misc To check BG 4 times daily, one touch verio meter. Dx code e11.65 400 each 3 10/14/2018 at Unknown time    liraglutide 0.6 mg/0.1 mL, 18 mg/3 mL, subq PNIJ (VICTOZA 3-TOMASZ) 0.6 mg/0.1 mL (18 mg/3 mL) PnIj Inject 1.8 mg into the skin once daily. 9 mL 11 10/14/2018 at Unknown time    metFORMIN (GLUCOPHAGE-XR) 500 MG 24 hr tablet Take 2 tablets (1,000 mg total) by mouth 2 (two) times daily with  "meals. 360 tablet 3 10/14/2018 at Unknown time    NOVOFINE PLUS 32 gauge x 1/6" Ndle    10/14/2018 at Unknown time    omega-3 fatty acids-vitamin E (FISH OIL) 1,000 mg Cap Take 1 capsule by mouth 2 (two) times daily.  0 10/14/2018 at Unknown time    ONETOUCH DELICA LANCETS 33 gauge Misc    10/14/2018 at Unknown time    pen needle, diabetic (BD ULTRA-FINE ANA PEN NEEDLE) 32 gauge x 5/32" Ndle Use one needle twice daily 100 each 3 10/14/2018 at Unknown time    pen needle, diabetic (BD ULTRA-FINE ANA PEN NEEDLE) 32 gauge x 5/32" Ndle 4 times daily insulin administration 400 each 5 10/14/2018 at Unknown time    venlafaxine (EFFEXOR-XR) 75 MG 24 hr capsule Take 2 capsules (150 mg total) by mouth once daily. 180 capsule 1 10/14/2018 at Unknown time    vitamin D 1000 units Tab Take 5 tablets (5,000 Units total) by mouth once daily.   10/14/2018 at Unknown time    blood-glucose meter kit To check BG 4 times daily, one touch verio meter. Dx code e11.65 1 each 0 Taking       Review of Systems   Unable to perform ROS: Mental status change   Constitutional: Negative for chills and fever.   HENT: Negative for hearing loss.    Eyes: Positive for visual disturbance.   Respiratory: Negative for shortness of breath.    Cardiovascular: Negative for chest pain.   Gastrointestinal: Negative for blood in stool and nausea.   Genitourinary: Negative for hematuria.   Skin: Negative for rash.   Allergic/Immunologic: Positive for immunocompromised state.   Neurological: Positive for speech difficulty.   Psychiatric/Behavioral: Positive for confusion and decreased concentration.     Objective:     Vital Signs (Most Recent):  Temp: 97.4 °F (36.3 °C) (10/17/18 1338)  Pulse: 73 (10/17/18 1338)  Resp: 16 (10/17/18 0727)  BP: 128/62 (10/17/18 1338)  SpO2: 97 % (10/17/18 1338)    Vital Signs Range (Last 24H):  Temp:  [96.2 °F (35.7 °C)-98.5 °F (36.9 °C)]   Pulse:  [73-93]   Resp:  [16-18]   BP: (116-155)/(60-85)   SpO2:  [94 %-100 %] "     Physical Exam   Constitutional: He appears well-developed. No distress.   HENT:   Head: Normocephalic.   Cardiovascular: Normal rate and regular rhythm. Exam reveals no friction rub.   No murmur heard.  Pulmonary/Chest: Effort normal and breath sounds normal. No stridor. No respiratory distress.   Abdominal: Soft. He exhibits no distension. There is no tenderness.   Musculoskeletal: He exhibits no edema.   Neurological: He is alert.       Neurological Exam:   LOC: alert  Attention Span: poor  Language: Global aphasia  Articulation: Mute/Anarthric  Orientation: mute  Visual Fields: blinks to threat b/l  EOM (CN III, IV, VI): Full/intact  Pupils (CN II, III): PERRL  Facial Movement (CN VII): Symmetric facial expression    Motor: 5/5 BUE, BLE  Sensation: localizes to pain in all 4 extremities    Laboratory:  CMP:   Recent Labs   Lab  10/17/18   1425   CALCIUM  9.5   NA  139   K  4.0   CO2  23   CL  108   BUN  26*   CREATININE  1.2     CBC:   Recent Labs   Lab  10/17/18   1425   WBC  8.14   RBC  3.56*   HGB  10.4*   HCT  30.6*   PLT  175   MCV  86   MCH  29.2   MCHC  34.0     Lipid Panel: No results for input(s): CHOL, LDLCALC, HDL, TRIG in the last 168 hours.  Coagulation: No results for input(s): PT, INR, APTT in the last 168 hours.  Hgb A1C:   Recent Labs   Lab  10/15/18   0638   HGBA1C  6.2*     TSH:   Recent Labs   Lab  10/15/18   0638   TSH  2.455       Diagnostic Results:      Brain imaging:  10/17/18 MRI Brain w/o; acute intervention protocol: Per independent resident review and interpretation,  No acute infarct, hemorrhage, nor mass effect.  Continued diffuse supratentorial and infratentorial T2 hyperintensities, similar to prior, although prior study with significant motion artifact.          Vessel Imaging:  10/17/18 MRA Brain w/o (part of acute intervention protocol study): Per independent resident review and interpretation,  No large-vessel occlusion, hemodynamically-significant stenosis, nor aneurysm  visualized.    Cardiac Evaluation:   10/15/18: Per independent resident review and interpretation,  Sinus tachycardia (103).  QTc 398.        Reynaldo Carter MD  [facility]  Department of Vascular Neurology   [facility]

## 2018-10-17 NOTE — SUBJECTIVE & OBJECTIVE
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 Love By mri.  Several active lesions, many old. 5/13: MRA brain/neck, MRI brain w/wo contrast show no vascular occlusion, multiple foci of hyperintensity in deep cerebral periventricular white matter in pattern of demyelinating process.  5/17: MRI spine performed, no spinal cord lesions. Hospitalization 6/2017. EEG was performed negative for seizures.  Repeat MRI showing new lesion, question whether this was demyelination versus CVA. 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 Findings compatible with a small acute lacunar infarct adjacent to the left frontal horn.  Nonspecific enhancement just inferior to this region and extending into the left basal ganglia, as well as within the inferior aspect of the left cerebellum.  No evidence of a focal mass. 2.  Extensive increased signal intensity involving the periventricular white matter compatible with demyelinating disease versus vasculitis. 3.  Clinical correlation and followup recommended. 4.  This reportedly flattened in the EPIC and the corpus callosum medical record system.    Mixed hyperlipidemia 11/9/2012    NAFLD (nonalcoholic fatty liver disease) 10/11/2013    CHASE (nonalcoholic steatohepatitis)     Obesity     Stroke     Type 2 diabetes mellitus with diabetic polyneuropathy, with long-term current use of insulin 5/14/2017    Type 2 diabetes mellitus with hyperglycemia, with long-term current use of insulin 10/11/2013     Past Surgical History:   Procedure Laterality Date    BIOPSY LIVER AND ULTRASOUND N/A 6/9/2015    Performed by [facility] FLR    COLONOSCOPY N/A 2/21/2014    Performed by  Mario Aceves MD at [facility] ENDO    ESOPHAGOGASTRODUODENOSCOPY (EGD) N/A 5/29/2017    Performed by Hai Carter [facility] at [facility] ENDO (2ND FLR)    SKIN CANCER EXCISION       Family History   Problem Relation Age of Onset    Heart disease Mother     Hypertension Mother     Heart failure Mother     Cataracts Mother     Cancer Father         lung    Diabetes Maternal Aunt     Stroke Sister     Rheum arthritis Paternal Grandmother     Amblyopia Neg Hx     Blindness Neg Hx     Glaucoma Neg Hx     Macular degeneration Neg Hx     Retinal detachment Neg Hx     Strabismus Neg Hx      Social History     Tobacco Use    Smoking status: Never Smoker    Smokeless tobacco: Never Used   Substance Use Topics    Alcohol use: No     Alcohol/week: 0.0 oz    Drug use: No     Review of patient's allergies indicates:  No Known Allergies    Medications: I have reviewed the current medication administration record.    Medications Prior to Admission   Medication Sig Dispense Refill Last Dose    alclomethasone (ACLOVATE) 0.05 % cream    10/15/2018 at Unknown time    aspirin (ECOTRIN) 81 MG EC tablet Take 81 mg by mouth once daily.   10/14/2018 at Unknown time    atenolol (TENORMIN) 50 MG tablet Take 1 tablet (50 mg total) by mouth once daily. 90 tablet 3 10/14/2018 at Unknown time    atorvastatin (LIPITOR) 40 MG tablet Take 1 tablet (40 mg total) by mouth once daily. 90 tablet 3 10/14/2018 at Unknown time    azaTHIOprine (IMURAN) 50 mg Tab Take 50mg (1 tab ) daily x5 days, then 100mg (2 tabs) daily x5 days, then 150mg daily (3 tabs thereafter) 90 tablet 3 10/14/2018 at Unknown time    blood sugar diagnostic (TRUE METRIX GLUCOSE TEST STRIP) Strp Test blood sugar four times a day 100 each 11 10/14/2018 at Unknown time    blood sugar diagnostic Strp To check BG 4 times daily, to use with insurance preferred meter 400 each 3 10/14/2018 at Unknown time    calcium carbonate (OS-DONNA) 500 mg calcium (1,250 mg) tablet Take 2  "tablets (1,000 mg total) by mouth once.  0 10/14/2018 at Unknown time    diltiaZEM (DILACOR XR) 240 MG CDCR Take 1 capsule (240 mg total) by mouth once daily. 90 capsule 3 10/14/2018 at Unknown time    insulin aspart U-100 (NOVOLOG) 100 unit/mL InPn pen Inject 20 Units into the skin 3 (three) times daily with meals. 18 mL 11 10/15/2018 at Unknown time    insulin glargine (BASAGLAR KWIKPEN U-100 INSULIN) 100 unit/mL (3 mL) InPn pen Inject 40 Units into the skin 2 (two) times daily. 2 Box 11 10/15/2018 at Unknown time    insulin syringe-needle U-100 (INSULIN SYRINGE) 1/2 mL 30 gauge x 5/16 Syrg Use 3x/day 300 each 3 10/15/2018 at Unknown time    insulin syringe-needle U-100 0.5 mL 31 gauge x 5/16 Syrg    10/15/2018 at Unknown time    irbesartan (AVAPRO) 300 MG tablet Take 1 tablet (300 mg total) by mouth every evening. 90 tablet 3 10/14/2018 at Unknown time    ketoconazole (NIZORAL) 2 % cream    10/14/2018 at Unknown time    lancets 30 gauge Misc Test blood sugar four times a day 100 each 11 10/14/2018 at Unknown time    lancets Misc To check BG 4 times daily, one touch verio meter. Dx code e11.65 400 each 3 10/14/2018 at Unknown time    liraglutide 0.6 mg/0.1 mL, 18 mg/3 mL, subq PNIJ (VICTOZA 3-TOMASZ) 0.6 mg/0.1 mL (18 mg/3 mL) PnIj Inject 1.8 mg into the skin once daily. 9 mL 11 10/14/2018 at Unknown time    metFORMIN (GLUCOPHAGE-XR) 500 MG 24 hr tablet Take 2 tablets (1,000 mg total) by mouth 2 (two) times daily with meals. 360 tablet 3 10/14/2018 at Unknown time    NOVOFINE PLUS 32 gauge x 1/6" Ndle    10/14/2018 at Unknown time    omega-3 fatty acids-vitamin E (FISH OIL) 1,000 mg Cap Take 1 capsule by mouth 2 (two) times daily.  0 10/14/2018 at Unknown time    ONETOUCH DELICA LANCETS 33 gauge Misc    10/14/2018 at Unknown time    pen needle, diabetic (BD ULTRA-FINE ANA PEN NEEDLE) 32 gauge x 5/32" Ndle Use one needle twice daily 100 each 3 10/14/2018 at Unknown time    pen needle, diabetic (BD " "ULTRA-FINE ANA PEN NEEDLE) 32 gauge x 5/32" Ndle 4 times daily insulin administration 400 each 5 10/14/2018 at Unknown time    venlafaxine (EFFEXOR-XR) 75 MG 24 hr capsule Take 2 capsules (150 mg total) by mouth once daily. 180 capsule 1 10/14/2018 at Unknown time    vitamin D 1000 units Tab Take 5 tablets (5,000 Units total) by mouth once daily.   10/14/2018 at Unknown time    blood-glucose meter kit To check BG 4 times daily, one touch verio meter. Dx code e11.65 1 each 0 Taking       Review of Systems   Unable to perform ROS: Mental status change   Constitutional: Negative for chills and fever.   HENT: Negative for hearing loss.    Eyes: Positive for visual disturbance.   Respiratory: Negative for shortness of breath.    Cardiovascular: Negative for chest pain.   Gastrointestinal: Negative for blood in stool and nausea.   Genitourinary: Negative for hematuria.   Skin: Negative for rash.   Allergic/Immunologic: Positive for immunocompromised state.   Neurological: Positive for speech difficulty.   Psychiatric/Behavioral: Positive for confusion and decreased concentration.     Objective:     Vital Signs (Most Recent):  Temp: 97.4 °F (36.3 °C) (10/17/18 1338)  Pulse: 73 (10/17/18 1338)  Resp: 16 (10/17/18 0727)  BP: 128/62 (10/17/18 1338)  SpO2: 97 % (10/17/18 1338)    Vital Signs Range (Last 24H):  Temp:  [96.2 °F (35.7 °C)-98.5 °F (36.9 °C)]   Pulse:  [73-93]   Resp:  [16-18]   BP: (116-155)/(60-85)   SpO2:  [94 %-100 %]     Physical Exam   Constitutional: He appears well-developed. No distress.   HENT:   Head: Normocephalic.   Cardiovascular: Normal rate and regular rhythm. Exam reveals no friction rub.   No murmur heard.  Pulmonary/Chest: Effort normal and breath sounds normal. No stridor. No respiratory distress.   Abdominal: Soft. He exhibits no distension. There is no tenderness.   Musculoskeletal: He exhibits no edema.   Neurological: He is alert.       Neurological Exam:   LOC: alert  Attention Span: " poor  Language: Global aphasia  Articulation: Mute/Anarthric  Orientation: mute  Visual Fields: blinks to threat b/l  EOM (CN III, IV, VI): Full/intact  Pupils (CN II, III): PERRL  Facial Movement (CN VII): Symmetric facial expression    Motor: 5/5 BUE, BLE  Sensation: localizes to pain in all 4 extremities    Laboratory:  CMP:   Recent Labs   Lab  10/17/18   1425   CALCIUM  9.5   NA  139   K  4.0   CO2  23   CL  108   BUN  26*   CREATININE  1.2     CBC:   Recent Labs   Lab  10/17/18   1425   WBC  8.14   RBC  3.56*   HGB  10.4*   HCT  30.6*   PLT  175   MCV  86   MCH  29.2   MCHC  34.0     Lipid Panel: No results for input(s): CHOL, LDLCALC, HDL, TRIG in the last 168 hours.  Coagulation: No results for input(s): PT, INR, APTT in the last 168 hours.  Hgb A1C:   Recent Labs   Lab  10/15/18   0638   HGBA1C  6.2*     TSH:   Recent Labs   Lab  10/15/18   0638   TSH  2.455       Diagnostic Results:      Brain imaging:  10/17/18 MRI Brain w/o; acute intervention protocol: Per independent resident review and interpretation,  No acute infarct, hemorrhage, nor mass effect.  Continued diffuse supratentorial and infratentorial T2 hyperintensities, similar to prior, although prior study with significant motion artifact.          Vessel Imaging:  10/17/18 MRA Brain w/o (part of acute intervention protocol study): Per independent resident review and interpretation,  No large-vessel occlusion, hemodynamically-significant stenosis, nor aneurysm visualized.    Cardiac Evaluation:   10/15/18: Per independent resident review and interpretation,  Sinus tachycardia (103).  QTc 398.

## 2018-10-17 NOTE — PROGRESS NOTES
[facility]  Neurology  Progress Note    Patient Name: Bessy Nunez  MRN: [id number]  Admission Date: 10/15/2018  Hospital Length of Stay: 1 days  Code Status: Full Code   Attending Provider: Jayesh Allen MD  Primary Care Physician: Edgar Nova MD   Principal Problem:Acute confusional state      Subjective:     Interval History: Patient has reportedly been paranoid and talking inappropriately since yesterday. Makes conversation that does not make sense.  Vitals stable.    Current Neurological Medications:     Current Facility-Administered Medications   Medication Dose Route Frequency Provider Last Rate Last Dose    acetaminophen tablet 650 mg  650 mg Oral Q4H PRN Teresita Horton MD        alteplase injection 2 mg  2 mg Intra-Catheter PRN Shana Love MD        aspirin EC tablet 81 mg  81 mg Oral Daily Teresita Horton MD   81 mg at 10/17/18 0944    atenolol tablet 50 mg  50 mg Oral Daily Teresita Horton MD   50 mg at 10/17/18 0944    atorvastatin tablet 40 mg  40 mg Oral Daily Teresita Horton MD   40 mg at 10/17/18 0944    azaTHIOprine tablet 150 mg  150 mg Oral Daily Teresita Horton MD   150 mg at 10/17/18 0943    cyclophosphamide (CYTOXAN) 750 mg/m2 = 1,675 mg in sodium chloride 0.9% 250 mL chemo infusion  750 mg/m2 Intravenous 1 time in Clinic/HOD Shana Love MD        dextrose 50% injection 12.5 g  12.5 g Intravenous PRN Teresita Horton MD        dextrose 50% injection 25 g  25 g Intravenous PRN Teresita Horton MD        diltiaZEM 24 hr capsule 240 mg  240 mg Oral Daily Teresita Horton MD   240 mg at 10/17/18 0944    enoxaparin injection 40 mg  40 mg Subcutaneous Daily Jayesh Allen MD        glucagon (human recombinant) injection 1 mg  1 mg Intramuscular PRN Teresita Horton MD        glucose chewable tablet 16 g  16 g Oral PRN Teresita Horton MD        glucose chewable tablet 24 g  24 g Oral PRN Teresita Horton MD        heparin, porcine (PF) 100  unit/mL injection flush 500 Units  500 Units Intravenous PRN Shana Love MD        insulin aspart U-100 pen 1-10 Units  1-10 Units Subcutaneous QID (AC + HS) PRN Jayesh Allen MD   4 Units at 10/17/18 1750    insulin aspart U-100 pen 15 Units  15 Units Subcutaneous TID WM Jayesh Allen MD   15 Units at 10/17/18 1749    insulin detemir U-100 pen 30 Units  30 Units Subcutaneous BID Jayesh Allen MD   30 Units at 10/17/18 0944    irbesartan tablet 300 mg  300 mg Oral QHS Teresita LUBNA Horton MD   300 mg at 10/16/18 2106    lactated ringers bolus 1,000 mL  1,000 mL Intravenous Once Jayesh Allen MD   1,000 mL at 10/17/18 1800    mesna (MESNEX) 725 mg in sodium chloride 0.9% 50 mL infusion  325 mg/m2 Intravenous 1 time in Clinic/HOD Shana Love MD        mesna (MESNEX) 725 mg in sodium chloride 0.9% 50 mL infusion  325 mg/m2 Intravenous 1 time in Clinic/HOD Shana Love MD        ondansetron disintegrating tablet 8 mg  8 mg Oral Q8H PRN Teresita Horton MD        sodium chloride 0.9% flush 10 mL  10 mL Intravenous PRN Shana Love MD        sodium chloride 0.9% flush 5 mL  5 mL Intravenous PRN Teresita Horton MD        vitamin D 1000 units tablet 5,000 Units  5,000 Units Oral Daily Teresita Horton MD   5,000 Units at 10/17/18 0943       Review of Systems   Unable to perform ROS: Other     Objective:     Vital Signs (Most Recent):  Temp: 98.3 °F (36.8 °C) (10/17/18 1548)  Pulse: 72 (10/17/18 1655)  Resp: 15 (10/17/18 1549)  BP: (!) 153/82 (10/17/18 1549)  SpO2: 97 % (10/17/18 1549) Vital Signs (24h Range):  Temp:  [96.2 °F (35.7 °C)-98.5 °F (36.9 °C)] 98.3 °F (36.8 °C)  Pulse:  [72-93] 72  Resp:  [15-18] 15  SpO2:  [96 %-100 %] 97 %  BP: (116-217)/(60-88) 153/82     Weight: 102.4 kg (225 lb 12.8 oz)  Body mass index is 33.34 kg/m².    Physical Exam   Constitutional: No distress.   Eyes: EOM are normal. Pupils are equal, round, and reactive to light.   Neurological: He is alert.    Reflex Scores:       Bicep reflexes are 2+ on the right side and 2+ on the left side.       Brachioradialis reflexes are 2+ on the right side and 2+ on the left side.       Patellar reflexes are 2+ on the right side and 2+ on the left side.       Achilles reflexes are 2+ on the right side and 2+ on the left side.  Psychiatric: His speech is normal.   Nursing note and vitals reviewed.      NEUROLOGICAL EXAMINATION:     MENTAL STATUS   Oriented to person.   Oriented to place.   Disoriented to time.   Speech: speech is normal   Level of consciousness: alert    CRANIAL NERVES     CN III, IV, VI   Pupils are equal, round, and reactive to light.  Extraocular motions are normal.     CN VII   Facial expression full, symmetric.     MOTOR EXAM   Overall muscle tone: normal    Strength   Right deltoid: 5/5  Left deltoid: 5/5  Right peroneal: 5/5  Left peroneal: 5/5  Right gastroc: 5/5  Left gastroc: 5/5    REFLEXES     Reflexes   Right brachioradialis: 2+  Left brachioradialis: 2+  Right biceps: 2+  Left biceps: 2+  Right patellar: 2+  Left patellar: 2+  Right achilles: 2+  Left achilles: 2+    GAIT AND COORDINATION     Tremor   Resting tremor: absent      Significant Labs:   Recent Lab Results       10/17/18  1425 10/17/18  1359 10/17/18  1334 10/17/18  0912 10/17/18  0418      Immature Granulocytes 0.4    0.2     Immature Grans (Abs) 0.03  Comment:  Mild elevation in immature granulocytes is non specific and   can be seen in a variety of conditions including stress response,   acute inflammation, trauma and pregnancy. Correlation with other   laboratory and clinical findings is essential.      0.02  Comment:  Mild elevation in immature granulocytes is non specific and   can be seen in a variety of conditions including stress response,   acute inflammation, trauma and pregnancy. Correlation with other   laboratory and clinical findings is essential.       Anion Gap 8         Baso # 0.00    0.01     Basophil% 0.0    0.1      BUN, Bld 26         Calcium 9.5         Chloride 108         CO2 23         Creatinine 1.2         Differential Method Automated    Automated     eGFR if  >60.0         eGFR if non  >60.0  Comment:  Calculation used to obtain the estimated glomerular filtration  rate (eGFR) is the CKD-EPI equation.            Eos # 0.0    0.0     Eosinophil% 0.0    0.0     Glucose 312         Gran # (ANC) 7.4    8.2     Gran% 91.0    92.5     Hematocrit 30.6    31.3     Hemoglobin 10.4    10.3     Lactate, Jonatan 2.7  Comment:  Falsely low lactic acid results can be found in samples   containing >=13.0 mg/dL total bilirubin and/or >=3.5 mg/dL   direct bilirubin.           Lymph # 0.4    0.3     Lymph% 4.3    3.9     Magnesium          MCH 29.2    29.0     MCHC 34.0    32.9     MCV 86    88     Mono # 0.4    0.3     Mono% 4.3    3.3     MPV 9.4    9.8     nRBC 0    0     Phosphorus          Platelets 175    181     POCT Glucose  294 317 408      Potassium 4.0         RBC 3.56    3.55     RDW 14.6    14.7     Sodium 139         WBC 8.14    8.82                 10/17/18  0417 10/16/18  2341 10/16/18  2032      Immature Granulocytes        Immature Grans (Abs)        Anion Gap 10       Baso #        Basophil%        BUN, Bld 26       Calcium 9.3       Chloride 105       CO2 20       Creatinine 1.4       Differential Method        eGFR if  >60.0       eGFR if non  54.2  Comment:  Calculation used to obtain the estimated glomerular filtration  rate (eGFR) is the CKD-EPI equation.          Eos #        Eosinophil%        Glucose 469  Comment:  *Critical value -   Results called to and read back by:VERNON MAN RN       Gran # (ANC)        Gran%        Hematocrit        Hemoglobin        Lactate, Jonatan        Lymph #        Lymph%        Magnesium 1.9       MCH        MCHC        MCV        Mono #        Mono%        MPV        nRBC        Phosphorus 2.9       Platelets        POCT  Glucose  367 481     Potassium 4.6       RBC        RDW        Sodium 135       WBC            All pertinent lab results from the past 24 hours have been reviewed.    Significant Imaging: I have reviewed all pertinent imaging results/findings within the past 24 hours.    Assessment and Plan:     Acute metabolic encephalopathy    - Became delirious over the course of the day (10/17).  - Stroke code called. MRI brain was unremarkable for any acute ischemic event but he does have white matter changes.  - Post the 2 doses of steroids, his BG spiked.  - Will delay Cytoxan infusion for today.  - Glucose control per primary        CNS vasculitis    58 yr old with CNC vasculitis , diagnosed in June 2017, has received 12 rounds of Cytoxan last dose on 7/11. Has been on Imuran since the past 2-3 weeks. Per the wife, has had some personality changes with increased lethargy, confusion since switching over to Imuran. MRI brain done on 9/14 shows supratentorial and infratentorial white matter changes with superimposed infarcts unchanged from prior imaging on 4/2.    - On exam, he is A&O x3, recal is 1/3+0 with prompting, Luria intact, unable to do serial 7s or count the months backwards. No motor/sensory deficits. DTRS 1+ in the lower extremities. Vibration and proprioception intact. No cerebellar signs.  - CT head shows patchy and confluent hypoattenuation in the deep cerebral and periventricular white matter in keeping with chronic microvascular ischemic change with superimposed remote lacunar type infarcts    Assessment and Recommendations:  - Was alert on AM exam but became more disoriented as the day progressed. By the afternoon, patient was lethargic and not making any conversation.  - Received 2 doses of pulse steroids. 3rd dose held.  - After speaking with Dr. Love, planned to restart the patient on Cytoxan. Will receive the first dose on 10/18; will delay by one day given his acute confusional state.  -Does not need  the 3rd dose of steroids.            VTE Risk Mitigation (From admission, onward)        Ordered     heparin, porcine (PF) 100 unit/mL injection flush 500 Units  As needed (PRN)      10/17/18 1648     enoxaparin injection 40 mg  Daily      10/16/18 1800     Place sequential compression device  Until discontinued      10/15/18 1344     Place ELIZABETH hose  Until discontinued      10/15/18 1344     IP VTE HIGH RISK PATIENT  Once      10/15/18 1344          Abe Kwok MD  Neurology  [facility]

## 2018-10-17 NOTE — PT/OT/SLP EVAL
"Physical Therapy Evaluation and Discharge Note    Patient Name:  Bessy Nunez   MRN:  [id number]    Recommendations:     Discharge Recommendations:  home health PT, home health OT   Discharge Equipment Recommendations: none   Barriers to discharge: None    Assessment:     Bessy Nunez is a 58 y.o. male admitted with a medical diagnosis of Acute confusional state. .  At this time, patient is functioning at their prior level of function and does not require further acute PT services.     Recent Surgery: * No surgery found *      Plan:     During this hospitalization, patient does not require further acute PT services.  Please re-consult if situation changes.      Subjective     Chief Complaint: headache  Patient/Family Comments/goals: "I can't tell if its day or night in here." - referring to not having windows in room  Pain/Comfort:  · Pain Rating 1: 0/10    Patients cultural, spiritual, Jewish conflicts given the current situation: none    Living Environment:  Pt lives c wife in 2SH c 0STE.  Living quarters on 1st floor and does not need to access 2nd.  PTA not driving, retired and 2x fall in past month.  Prior to admission, patients level of function was requiring assistance c ADLs and mobility.  Equipment used at home: rollator, bath bench.  DME owned (not currently used): none.  Upon discharge, patient will have assistance from wife.    Objective:     Communicated with RN and OT prior to session.  Patient found HOB elevated upon PT entry to room found with: peripheral IV     General Precautions: Standard, fall   Orthopedic Precautions:N/A   Braces: N/A     Exams:  · Cognitive Exam:  Patient is oriented to Person, Place, Time and Situation  · Gross Motor Coordination:  WFL  · Sensation:    · -       Intact  · RLE ROM: WFL  · RLE Strength: WFL  · LLE ROM: WFL  · LLE Strength: WFL    Functional Mobility:  · Bed Mobility:     · Rolling Left:  independence  · Scooting: independence  · Supine to Sit: " independence  · Transfers:     · Sit to Stand:  supervision with no AD  · Gait: 120ft c RW S  · Min cues to maintain safe proximity to RW, mild unsteadiness c walking + head turns, highly distractible, no overt LOB  · Balance: sitting (I); standing (S)    AM-PAC 6 CLICK MOBILITY  Total Score:21       Therapeutic Activities and Exercises:   Pt educated on: PT role/POC; safety c mobility; benefits of OOB activities; performing therex; d/c recs - v/u      AM-PAC 6 CLICK MOBILITY  Total Score:21     Patient left sitting EOB with all lines intact, call button in reach, RN notified and wife present.    GOALS:   Multidisciplinary Problems     Physical Therapy Goals     Not on file          Multidisciplinary Problems (Resolved)        Problem: Physical Therapy Goal    Goal Priority Disciplines Outcome Goal Variances Interventions   Physical Therapy Goal   (Resolved)     PT, PT/OT Outcome(s) achieved     Description:  Pt does not require additional acute PT services at this time d/t baseline c functional mobility. Pt amb 120 ft c RW S.    Pt educated on asking medical staff for PT consult if changes in functional status occurs. - v/u                          History:     Past Medical History:   Diagnosis Date    Amblyopia     Cataract     CNS vasculitis 6/2/2017    Follows with Dr. Love By mri.  Several active lesions, many old. 5/13: MRA brain/neck, MRI brain w/wo contrast show no vascular occlusion, multiple foci of hyperintensity in deep cerebral periventricular white matter in pattern of demyelinating process.  5/17: MRI spine performed, no spinal cord lesions. Hospitalization 6/2017. EEG was performed negative for seizures.  Repeat MRI showing new lesion, question whether this was demyelination versus CVA. 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 Findings compatible with a small acute lacunar infarct adjacent to the left frontal horn.  Nonspecific enhancement just inferior to this region and extending into the left basal ganglia, as well as within the inferior aspect of the left cerebellum.  No evidence of a focal mass. 2.  Extensive increased signal intensity involving the periventricular white matter compatible with demyelinating disease versus vasculitis. 3.  Clinical correlation and followup recommended. 4.  This reportedly flattened in the EPIC and the corpus callosum medical record system.    Mixed hyperlipidemia 11/9/2012    NAFLD (nonalcoholic fatty liver disease) 10/11/2013    CHASE (nonalcoholic steatohepatitis)     Obesity     Stroke     Type 2 diabetes mellitus with diabetic polyneuropathy, with long-term current use of insulin 5/14/2017    Type 2 diabetes mellitus with hyperglycemia, with long-term current use of insulin 10/11/2013       Past Surgical History:   Procedure Laterality Date    BIOPSY LIVER AND ULTRASOUND N/A 6/9/2015    Performed by [facility] at [facility] OR 2ND FLR    COLONOSCOPY N/A 2/21/2014    Per[facility]    ESOPHAGOGASTRODUODENOSCOPY (EGD) N/A 5/29/2017    Performed by Hai Carter MD at [facility] ENDO (2ND FLR)    SKIN CANCER EXCISION         Clinical Decision Making:     History  Co-morbidities and personal factors that may impact the plan of care Examination  Body Structures and Functions, activity limitations and participation restrictions that may impact the plan of care Clinical Presentation   Decision Making/ Complexity Score   Co-morbidities:   [] Time since onset of injury / illness / exacerbation  [] Status of current condition  []Patient's cognitive status and safety concerns    [] Multiple Medical Problems (see med hx)  Personal Factors:   [] Patient's age  [] Prior Level of function   [] Patient's home  situation (environment and family support)  [] Patient's level of motivation  [] Expected progression of patient      HISTORY:(criteria)    [x] 34157 - no personal factors/history    [] 33446 - has 1-2 personal factor/comorbidity     [] 36838 - has >3 personal factor/comorbidity     Body Regions:  [] Objective examination findings  [] Head     []  Neck  [] Trunk   [] Upper Extremity  [] Lower Extremity    Body Systems:  [] For communication ability, affect, cognition, language, and learning style: the assessment of the ability to make needs known, consciousness, orientation (person, place, and time), expected emotional /behavioral responses, and learning preferences (eg, learning barriers, education  needs)  [] For the neuromuscular system: a general assessment of gross coordinated movement (eg, balance, gait, locomotion, transfers, and transitions) and motor function  (motor control and motor learning)  [] For the musculoskeletal system: the assessment of gross symmetry, gross range of motion, gross strength, height, and weight  [] For the integumentary system: the assessment of pliability(texture), presence of scar formation, skin color, and skin integrity  [] For cardiovascular/pulmonary system: the assessment of heart rate, respiratory rate, blood pressure, and edema     Activity limitations:    [] Patient's cognitive status and saf ety concerns          [] Status of current condition      [] Weight bearing restriction  [] Cardiopulmunary Restriction    Participation Restrictions:   [] Goals and goal agreement with the patient     [] Rehab potential (prognosis) and probable outcome      Examination of Body System: (criteria)    [x] 43684 - addressing 1-2 elements    [] 04160 - addressing a total of 3 or more elements     [] 34445 -  Addressing a total of 4 or more elements         Clinical Presentation: (criteria)  Stable - 93230     On examination of body system using standardized tests and measures patient  presents with 1-2 elements from any of the following: body structures and functions, activity limitations, and/or participation restrictions.  Leading to a clinical presentation that is considered stable and/or uncomplicated                              Clinical Decision Making  (Eval Complexity):  Low- 86564     Time Tracking:     PT Received On: 10/17/18  PT Start Time: 0905     PT Stop Time: 0920  PT Total Time (min): 15 min     Billable Minutes: Evaluation 15 min      Edgardo Nolasco, PT  10/17/2018

## 2018-10-17 NOTE — NURSING
Notified care team of critical lab value of glucose 469    Per care team, sliding scale will be changed to q 4 hrs    5 units of sliding scale insulin given

## 2018-10-17 NOTE — ASSESSMENT & PLAN NOTE
- Became delirious over the course of the day (10/17).  - Stroke code called. MRI brain was unremarkable for any acute ischemic event but he does have white matter changes.  - Post the 2 doses of steroids, his BG spiked.  - Will delay Cytoxan infusion for today.  - Glucose control per primary

## 2018-10-17 NOTE — PROGRESS NOTES
Chemotherapy orders released but post-hydration not signed per MD. Paged Dr. Love twice to obtain order for post-hydration. No call back. Paged Dr. Allen - Dr Nelson called back and notified nurse chemotherapy on hold until am. Called chemo infusion pharm to notify not to mix. Stated chemo was already mixed. Good for 24hrs - will keep in chemo fridge until am when floor ready for chemo.

## 2018-10-17 NOTE — ASSESSMENT & PLAN NOTE
Assessment  Mr. Nunez is a 59 yo male w/ PMH significant for CNS vasculitis (dx 2017, followed by Dr. Love) DM2, HTN, HLD, NAFLD who presented to [facility] on 10/15 after fall.  Stroke code called on 10/17 afternoon for new-onset aphasia.    By the time returned from MRI, pt beginning to verbalize, aphasia improving.  MRI without acute infarction.  Hyperglycemic on exam.    Differential diagnoses include: Encephalopathy 2/2 metabolic (hyperglycemia), steroids.    Recommendations & Plan:  -No acute infarct on imaging.  -F/u repeat labs  -Control glucose  -Gen neuro recommending return to cytoxan  -F/u EEG (10/15, report not complete)  -Discussed with gen neurology and primary team    Vascular neurology will sign off.  Please call for any questions/concerns.

## 2018-10-17 NOTE — PLAN OF CARE
"Problem: Patient Care Overview  Goal: Plan of Care Review  Outcome: Ongoing (interventions implemented as appropriate)  Pt not oriented to time. Upon arrival to floor, pt hallucinating and seeing "tongues on the ceiling."  After a nap, pt now appears more calm and no longer c/o hallucinations. Pt has remained free from injury this shift. Pt had no c/o nausea or pain this shift. Pt's wife at bedside and involved in care. Bed in low locked position. Call light and personal belongings within reach. Side rails up x2. Nonskid socks in place. All questions and comments addressed at this time. Will continue to monitor.  Fall, Pressure ulcer, infection precautions continued.        "

## 2018-10-17 NOTE — NURSING
Patient's diaper changed, blue padding changed as well. Patient confused, thought it was 7 am; re oriented patient. Patient is calm and cooperative. Wife remains at the bedside; bed alarms on.

## 2018-10-17 NOTE — PLAN OF CARE
Problem: Occupational Therapy Goal  Goal: Occupational Therapy Goal  Outcome: Outcome(s) achieved Date Met: 10/17/18  jyothi/c   Brina Hernandez, OTR

## 2018-10-17 NOTE — HPI
Mr. Nunez is a 57 yo male w/ PMH significant for CNS vasculitis (dx 2017, followed by Dr. Love) DM2, HTN, HLD, NAFLD who presented to [facility] on 10/15 after fall.  Stroke code called on 10/17 afternoon for new-onset aphasia.    Pt had previously been on cytoxan for management of vasculitis, but was recently switched to azathioprine x2-3 weeks.  Family reports 2-3 weeks of gradual decline in mental status (increased lethargy, confusion), but no prior episodes of aphasia.  General neurology consulted and recommended pulse steroids x3 days.  Since admission, has been hyperglycemic.      Vascular neurology consulted for acute onset aphasia on 10/17.  He is able to ambulate without assistance, but requires help from family for ADLs.  On ASA 81 and atorvastatin 40 Qdaily at baseline.

## 2018-10-17 NOTE — PLAN OF CARE
Problem: Physical Therapy Goal  Goal: Physical Therapy Goal  Pt does not require additional acute PT services at this time d/t baseline c functional mobility. Pt amb 120 ft c RW S.    Pt educated on asking medical staff for PT consult if changes in functional status occurs. - v/u        Outcome: Outcome(s) achieved Date Met: 10/17/18  Eval and D/C from acute PT services    Edgardo Nolasco DPT  10/17/2018

## 2018-10-18 PROBLEM — G93.41 ACUTE METABOLIC ENCEPHALOPATHY: Status: RESOLVED | Noted: 2018-10-15 | Resolved: 2018-10-18

## 2018-10-18 LAB
ANION GAP SERPL CALC-SCNC: 6 MMOL/L
BASOPHILS # BLD AUTO: 0 K/UL
BASOPHILS NFR BLD: 0 %
BUN SERPL-MCNC: 27 MG/DL
CALCIUM SERPL-MCNC: 8.9 MG/DL
CHLORIDE SERPL-SCNC: 107 MMOL/L
CO2 SERPL-SCNC: 25 MMOL/L
CREAT SERPL-MCNC: 1.1 MG/DL
DIFFERENTIAL METHOD: ABNORMAL
EOSINOPHIL # BLD AUTO: 0 K/UL
EOSINOPHIL NFR BLD: 0 %
ERYTHROCYTE [DISTWIDTH] IN BLOOD BY AUTOMATED COUNT: 15.1 %
EST. GFR  (AFRICAN AMERICAN): >60 ML/MIN/1.73 M^2
EST. GFR  (NON AFRICAN AMERICAN): >60 ML/MIN/1.73 M^2
GLUCOSE SERPL-MCNC: 370 MG/DL
HCT VFR BLD AUTO: 30.7 %
HGB BLD-MCNC: 10.4 G/DL
IMM GRANULOCYTES # BLD AUTO: 0.03 K/UL
IMM GRANULOCYTES NFR BLD AUTO: 0.3 %
LYMPHOCYTES # BLD AUTO: 0.5 K/UL
LYMPHOCYTES NFR BLD: 5.3 %
MAGNESIUM SERPL-MCNC: 2 MG/DL
MCH RBC QN AUTO: 29.5 PG
MCHC RBC AUTO-ENTMCNC: 33.9 G/DL
MCV RBC AUTO: 87 FL
MONOCYTES # BLD AUTO: 0.7 K/UL
MONOCYTES NFR BLD: 7.9 %
NEUTROPHILS # BLD AUTO: 7.6 K/UL
NEUTROPHILS NFR BLD: 86.5 %
NRBC BLD-RTO: 0 /100 WBC
PHOSPHATE SERPL-MCNC: 2.9 MG/DL
PLATELET # BLD AUTO: 209 K/UL
PMV BLD AUTO: 10 FL
POCT GLUCOSE: 242 MG/DL (ref 70–110)
POCT GLUCOSE: 256 MG/DL (ref 70–110)
POCT GLUCOSE: 265 MG/DL (ref 70–110)
POCT GLUCOSE: 280 MG/DL (ref 70–110)
POCT GLUCOSE: 358 MG/DL (ref 70–110)
POTASSIUM SERPL-SCNC: 4.4 MMOL/L
RBC # BLD AUTO: 3.52 M/UL
SODIUM SERPL-SCNC: 138 MMOL/L
WBC # BLD AUTO: 8.75 K/UL

## 2018-10-18 PROCEDURE — 20600001 HC STEP DOWN PRIVATE ROOM

## 2018-10-18 PROCEDURE — 99232 SBSQ HOSP IP/OBS MODERATE 35: CPT | Mod: ,,, | Performed by: HOSPITALIST

## 2018-10-18 PROCEDURE — 84100 ASSAY OF PHOSPHORUS: CPT

## 2018-10-18 PROCEDURE — 36569 INSJ PICC 5 YR+ W/O IMAGING: CPT

## 2018-10-18 PROCEDURE — 36415 COLL VENOUS BLD VENIPUNCTURE: CPT

## 2018-10-18 PROCEDURE — 25000003 PHARM REV CODE 250: Performed by: HOSPITALIST

## 2018-10-18 PROCEDURE — 99233 SBSQ HOSP IP/OBS HIGH 50: CPT | Mod: ,,, | Performed by: PSYCHIATRY & NEUROLOGY

## 2018-10-18 PROCEDURE — 80048 BASIC METABOLIC PNL TOTAL CA: CPT

## 2018-10-18 PROCEDURE — C1751 CATH, INF, PER/CENT/MIDLINE: HCPCS

## 2018-10-18 PROCEDURE — 85025 COMPLETE CBC W/AUTO DIFF WBC: CPT

## 2018-10-18 PROCEDURE — 63600175 PHARM REV CODE 636 W HCPCS: Performed by: HOSPITALIST

## 2018-10-18 PROCEDURE — 83735 ASSAY OF MAGNESIUM: CPT

## 2018-10-18 PROCEDURE — 63600175 PHARM REV CODE 636 W HCPCS: Performed by: PSYCHIATRY & NEUROLOGY

## 2018-10-18 PROCEDURE — 25000003 PHARM REV CODE 250: Performed by: PSYCHIATRY & NEUROLOGY

## 2018-10-18 PROCEDURE — 87086 URINE CULTURE/COLONY COUNT: CPT

## 2018-10-18 PROCEDURE — 76937 US GUIDE VASCULAR ACCESS: CPT

## 2018-10-18 RX ORDER — SODIUM CHLORIDE 450 MG/100ML
INJECTION, SOLUTION INTRAVENOUS CONTINUOUS
Status: ACTIVE | OUTPATIENT
Start: 2018-10-18 | End: 2018-10-18

## 2018-10-18 RX ORDER — INSULIN ASPART 100 [IU]/ML
25 INJECTION, SOLUTION INTRAVENOUS; SUBCUTANEOUS
Status: DISCONTINUED | OUTPATIENT
Start: 2018-10-18 | End: 2018-10-19

## 2018-10-18 RX ORDER — SODIUM CHLORIDE 9 MG/ML
INJECTION, SOLUTION INTRAVENOUS CONTINUOUS
Status: ACTIVE | OUTPATIENT
Start: 2018-10-18 | End: 2018-10-18

## 2018-10-18 RX ORDER — SODIUM CHLORIDE 450 MG/100ML
INJECTION, SOLUTION INTRAVENOUS CONTINUOUS
Status: DISCONTINUED | OUTPATIENT
Start: 2018-10-18 | End: 2018-10-18

## 2018-10-18 RX ORDER — ONDANSETRON 2 MG/ML
8 INJECTION INTRAMUSCULAR; INTRAVENOUS ONCE
Status: COMPLETED | OUTPATIENT
Start: 2018-10-18 | End: 2018-10-18

## 2018-10-18 RX ADMIN — ENOXAPARIN SODIUM 40 MG: 100 INJECTION SUBCUTANEOUS at 04:10

## 2018-10-18 RX ADMIN — ASPIRIN 81 MG: 81 TABLET, COATED ORAL at 08:10

## 2018-10-18 RX ADMIN — SODIUM CHLORIDE: 0.45 INJECTION, SOLUTION INTRAVENOUS at 11:10

## 2018-10-18 RX ADMIN — INSULIN ASPART 25 UNITS: 100 INJECTION, SOLUTION INTRAVENOUS; SUBCUTANEOUS at 12:10

## 2018-10-18 RX ADMIN — INSULIN ASPART 25 UNITS: 100 INJECTION, SOLUTION INTRAVENOUS; SUBCUTANEOUS at 05:10

## 2018-10-18 RX ADMIN — INSULIN ASPART 5 UNITS: 100 INJECTION, SOLUTION INTRAVENOUS; SUBCUTANEOUS at 09:10

## 2018-10-18 RX ADMIN — ATENOLOL 50 MG: 25 TABLET ORAL at 08:10

## 2018-10-18 RX ADMIN — ATORVASTATIN CALCIUM 40 MG: 20 TABLET, FILM COATED ORAL at 08:10

## 2018-10-18 RX ADMIN — ONDANSETRON 8 MG: 2 INJECTION INTRAMUSCULAR; INTRAVENOUS at 12:10

## 2018-10-18 RX ADMIN — INSULIN ASPART 6 UNITS: 100 INJECTION, SOLUTION INTRAVENOUS; SUBCUTANEOUS at 05:10

## 2018-10-18 RX ADMIN — INSULIN ASPART 6 UNITS: 100 INJECTION, SOLUTION INTRAVENOUS; SUBCUTANEOUS at 12:10

## 2018-10-18 RX ADMIN — MESNA 725 MG: 100 INJECTION, SOLUTION INTRAVENOUS at 02:10

## 2018-10-18 RX ADMIN — INSULIN ASPART 6 UNITS: 100 INJECTION, SOLUTION INTRAVENOUS; SUBCUTANEOUS at 09:10

## 2018-10-18 RX ADMIN — INSULIN ASPART 25 UNITS: 100 INJECTION, SOLUTION INTRAVENOUS; SUBCUTANEOUS at 08:10

## 2018-10-18 RX ADMIN — SODIUM CHLORIDE: 0.9 INJECTION, SOLUTION INTRAVENOUS at 02:10

## 2018-10-18 RX ADMIN — VITAMIN D, TAB 1000IU (100/BT) 5000 UNITS: 25 TAB at 08:10

## 2018-10-18 RX ADMIN — CYCLOPHOSPHAMIDE 1675 MG: 1 INJECTION, POWDER, FOR SOLUTION INTRAVENOUS; ORAL at 02:10

## 2018-10-18 RX ADMIN — AZATHIOPRINE 150 MG: 50 TABLET ORAL at 08:10

## 2018-10-18 RX ADMIN — MESNA 725 MG: 100 INJECTION, SOLUTION INTRAVENOUS at 01:10

## 2018-10-18 RX ADMIN — DILTIAZEM HYDROCHLORIDE 240 MG: 240 CAPSULE, EXTENDED RELEASE ORAL at 08:10

## 2018-10-18 RX ADMIN — IRBESARTAN 300 MG: 150 TABLET ORAL at 09:10

## 2018-10-18 NOTE — ASSESSMENT & PLAN NOTE
- Developed acute onset confusion on 10/17 likely 2/2 steroids + dehydration + hyperglycemia  - Stroke code called due to concern for stroke. MRI unremarkable for acute infarct.  - On today's eval he seems A&O x3   - IVF, BG control per primary

## 2018-10-18 NOTE — PROGRESS NOTES
Cyclophosphamide initiated over 30 minutes to R upper arm midline with positive blood return. Chemotherapy consent on chart. Drug, 2 pt identifiers, BSA and chemo calculation checked with second certified RN. Instructed pt to call with any problems/discomfort. Verbalized understanding. Call light within reach.

## 2018-10-18 NOTE — CONSULTS
Placed 18g X 10cm midline in right basilic vein of RUE using realtime ultrasound guidance. Lot#[id number]. Remove on or before 11/16/2018.

## 2018-10-18 NOTE — PROGRESS NOTES
Hospital Medicine   Progress Note     [place] [facility] [place] Jayesh Allen MD   Admit Date: 10/15/2018   [facility]EL[place]    Code status: Full Code   Principal Problem: Acute confusional state     Interval hx: Patient seen and examined at bedside today. Overnight: ROVERTO. This afternoon, patient with acute change in mental status, became non-verbal and initially difficult to rouse. VS remained stable. BG checked and was elevated to mid 300's > given additional SS insulin. BMP rechecked - no evidence of DKA, lytes ok. Slight lactic acidosis but no fever, leukocytosis on repeat CBC, or hypotension. Stoke activation called, and patient taken for stat MRI which revealed no acute CVA. Per Neurology recs, holding initiating cyclophosphamide infusion until 10/18 given acute mental status change    ROS   Unable to assess as patient not speaking    PEx   Temp:  [96.2 °F (35.7 °C)-98.5 °F (36.9 °C)]   Pulse:  [72-90]   Resp:  [15-18]   BP: (116-217)/(60-88)   SpO2:  [96 %-100 %]      I & O (Last 24H):     Intake/Output Summary (Last 24 hours) at 10/17/2018 2038  Last data filed at 10/17/2018 1808  Gross per 24 hour   Intake 1703.33 ml   Output --   Net 1703.33 ml     General appearance: NAD, cooperative, alert but not verbal  Lungs: clear to auscultation bilaterally, no wheezes, no crackles, no rales, no rhonchi        Heart: regular rate and rhythm, S1, S2 normal, no murmur              Abdomen: Soft, non-tender, non-distended, normoactive bowel sounds. No masses, no organomegaly    Extremities: atraumatic, no deformities, no cyanosis or edema                                Neurologic: following some commands, limited attention, awake and tracking with to voice. 4/5 strength in UE's and 3/5 strength in LE's - likely due to inattention                                                      Skin: No rash, tears, or ecchymosis    Recent Results (from the past 24 hour(s))   POCT glucose    Collection Time: 10/16/18 11:41 PM   Result Value Ref  Range    POCT Glucose 367 (H) 70 - 110 mg/dL   Basic Metabolic Panel (BMP)    Collection Time: 10/17/18  4:17 AM   Result Value Ref Range    Sodium 135 (L) 136 - 145 mmol/L    Potassium 4.6 3.5 - 5.1 mmol/L    Chloride 105 95 - 110 mmol/L    CO2 20 (L) 23 - 29 mmol/L    Glucose 469 (HH) 70 - 110 mg/dL    BUN, Bld 26 (H) 6 - 20 mg/dL    Creatinine 1.4 0.5 - 1.4 mg/dL    Calcium 9.3 8.7 - 10.5 mg/dL    Anion Gap 10 8 - 16 mmol/L    eGFR if African American >60.0 >60 mL/min/1.73 m^2    eGFR if non  54.2 (A) >60 mL/min/1.73 m^2   Magnesium    Collection Time: 10/17/18  4:17 AM   Result Value Ref Range    Magnesium 1.9 1.6 - 2.6 mg/dL   Phosphorus    Collection Time: 10/17/18  4:17 AM   Result Value Ref Range    Phosphorus 2.9 2.7 - 4.5 mg/dL   CBC with Automated Differential    Collection Time: 10/17/18  4:18 AM   Result Value Ref Range    WBC 8.82 3.90 - 12.70 K/uL    RBC 3.55 (L) 4.60 - 6.20 M/uL    Hemoglobin 10.3 (L) 14.0 - 18.0 g/dL    Hematocrit 31.3 (L) 40.0 - 54.0 %    MCV 88 82 - 98 fL    MCH 29.0 27.0 - 31.0 pg    MCHC 32.9 32.0 - 36.0 g/dL    RDW 14.7 (H) 11.5 - 14.5 %    Platelets 181 150 - 350 K/uL    MPV 9.8 9.2 - 12.9 fL    Immature Granulocytes 0.2 0.0 - 0.5 %    Gran # (ANC) 8.2 (H) 1.8 - 7.7 K/uL    Immature Grans (Abs) 0.02 0.00 - 0.04 K/uL    Lymph # 0.3 (L) 1.0 - 4.8 K/uL    Mono # 0.3 0.3 - 1.0 K/uL    Eos # 0.0 0.0 - 0.5 K/uL    Baso # 0.01 0.00 - 0.20 K/uL    nRBC 0 0 /100 WBC    Gran% 92.5 (H) 38.0 - 73.0 %    Lymph% 3.9 (L) 18.0 - 48.0 %    Mono% 3.3 (L) 4.0 - 15.0 %    Eosinophil% 0.0 0.0 - 8.0 %    Basophil% 0.1 0.0 - 1.9 %    Differential Method Automated    POCT glucose    Collection Time: 10/17/18  9:12 AM   Result Value Ref Range    POCT Glucose 408 (H) 70 - 110 mg/dL   POCT glucose    Collection Time: 10/17/18  1:34 PM   Result Value Ref Range    POCT Glucose 317 (H) 70 - 110 mg/dL   POCT glucose    Collection Time: 10/17/18  1:59 PM   Result Value Ref Range    POCT  Glucose 294 (H) 70 - 110 mg/dL   Basic metabolic panel    Collection Time: 10/17/18  2:25 PM   Result Value Ref Range    Sodium 139 136 - 145 mmol/L    Potassium 4.0 3.5 - 5.1 mmol/L    Chloride 108 95 - 110 mmol/L    CO2 23 23 - 29 mmol/L    Glucose 312 (H) 70 - 110 mg/dL    BUN, Bld 26 (H) 6 - 20 mg/dL    Creatinine 1.2 0.5 - 1.4 mg/dL    Calcium 9.5 8.7 - 10.5 mg/dL    Anion Gap 8 8 - 16 mmol/L    eGFR if African American >60.0 >60 mL/min/1.73 m^2    eGFR if non African American >60.0 >60 mL/min/1.73 m^2   Lactic acid, plasma    Collection Time: 10/17/18  2:25 PM   Result Value Ref Range    Lactate (Lactic Acid) 2.7 (H) 0.5 - 2.2 mmol/L   CBC auto differential    Collection Time: 10/17/18  2:25 PM   Result Value Ref Range    WBC 8.14 3.90 - 12.70 K/uL    RBC 3.56 (L) 4.60 - 6.20 M/uL    Hemoglobin 10.4 (L) 14.0 - 18.0 g/dL    Hematocrit 30.6 (L) 40.0 - 54.0 %    MCV 86 82 - 98 fL    MCH 29.2 27.0 - 31.0 pg    MCHC 34.0 32.0 - 36.0 g/dL    RDW 14.6 (H) 11.5 - 14.5 %    Platelets 175 150 - 350 K/uL    MPV 9.4 9.2 - 12.9 fL    Immature Granulocytes 0.4 0.0 - 0.5 %    Gran # (ANC) 7.4 1.8 - 7.7 K/uL    Immature Grans (Abs) 0.03 0.00 - 0.04 K/uL    Lymph # 0.4 (L) 1.0 - 4.8 K/uL    Mono # 0.4 0.3 - 1.0 K/uL    Eos # 0.0 0.0 - 0.5 K/uL    Baso # 0.00 0.00 - 0.20 K/uL    nRBC 0 0 /100 WBC    Gran% 91.0 (H) 38.0 - 73.0 %    Lymph% 4.3 (L) 18.0 - 48.0 %    Mono% 4.3 4.0 - 15.0 %    Eosinophil% 0.0 0.0 - 8.0 %    Basophil% 0.0 0.0 - 1.9 %    Differential Method Automated    Lactic acid, plasma    Collection Time: 10/17/18  7:47 PM   Result Value Ref Range    Lactate (Lactic Acid) 2.7 (H) 0.5 - 2.2 mmol/L   Hemoglobin    Collection Time: 10/17/18  7:47 PM   Result Value Ref Range    Hemoglobin 10.1 (L) 14.0 - 18.0 g/dL       Recent Labs   Lab 10/16/18  1746 10/16/18  2032 10/16/18  2341 10/17/18  0912 10/17/18  1334 10/17/18  1359   POCTGLUCOSE 382* 481* 367* 408* 317* 294*        aspirin  81 mg Oral Daily    atenolol  50  mg Oral Daily    atorvastatin  40 mg Oral Daily    azaTHIOprine  150 mg Oral Daily    cyclophosphamide (CYTOXAN) chemo infusion  750 mg/m2 Intravenous 1 time in Clinic/HOD    diltiaZEM  240 mg Oral Daily    enoxaparin  40 mg Subcutaneous Daily    insulin aspart U-100  15 Units Subcutaneous TID WM    insulin detemir U-100  30 Units Subcutaneous BID    irbesartan  300 mg Oral QHS    mesna (MESNEX) infusion  325 mg/m2 Intravenous 1 time in Clinic/HOD    mesna (MESNEX) infusion  325 mg/m2 Intravenous 1 time in Clinic/HOD    vitamin D  5,000 Units Oral Daily       acetaminophen, alteplase, dextrose 50%, dextrose 50%, glucagon (human recombinant), glucose, glucose, heparin, porcine (PF), insulin aspart U-100, ondansetron, sodium chloride 0.9%, sodium chloride 0.9%    Assessment & Plan:  Mr. Nunez is a 58 year old man with a hx of Primary CNS Vasculitis , IDDM2, Hx of CVA who presented to the ED following worsening confusion and a mechanical fall at home. Admitted to Hospital Medicine, and Neurology consulted due to concern for flare of known CNS Vasculitis. Plan to coordinate inpatient then outpatient resumption of Cyclophosphamide infusions, given previous clinical improvement on this regimen.    Primary CNS Vasculitis:  Acute Encephalopathy, Worsening  Mechanical Falls  -pt presenting with recurrent falls, confusion, instability and other symptoms similar to his presentation of CNS vasculitis in the past. Symptoms started after he was switched from cyclophosphamide to azothioprine  -Initial CT head negative for acute abnormalities. MRI performed as part of stroke activation (10/17) negative for acute infarct or bleed. Appreciate Vascular Neurology assistance  - Plan to start Cyclophosphamide infusions on 10/18, delaying one day per Neurology recs given worsening encephalopathy  - Metabolic workup: RPR: NR, UDS: negative, UA: no evidence of infection, TSH wnl's, Vitamin B12 wnl's, BG elevated but improving  with increase Insulin dosing, no evidence of DKA. Slight lactic acidosis, but no leukocytosis, fever, or hypotension - BCx's and UCx's ordered to complete workup  - Final EEG report (10/15) pending  -continue azathioprine for now  -Neurochecks with VS  -PT/OT, progressive mobility  -fall precautions   -Maintain Delirium precautions     Insulin Dependent Diabetes Mellitus, Well Controlled  Long-Term Use of Insulin  Glucocorticoid-Induced Hyperglycemia  -Last HbA1c: 6.2 on 10/15/18  -hold PTA metformin  -SSI with accuchecks qACHS  -Increased Levemir to 40 units BID, Aspart to 20 units with meals, MD GEORGES insulin every 4 hours (eating pt protocol)  -diabetic diet     Hypertension:  -continue atenolol, diltiazem, irbesartan     Depression:  -hold venlafazine for now as this is a new medication that was started few days ago that could be contributing to his symptoms      Dyslipidemia:  -continue atorvastatin     Hx of CVA:  -cont asa and atorvastatin       DVT PPx: Lovenox 40 mg daily  Diet: Diabetic  Dispo: Pending coordination of Cyclophosphamide infusions and improvement in mental status      Jayesh Allen MD  Hospital Medicine Staff  [facility]   Pager: [phone]

## 2018-10-18 NOTE — PROGRESS NOTES
[facility]  Neurology  Progress Note    Patient Name: Bessy Nunez  MRN: [id number]  Admission Date: 10/15/2018  Hospital Length of Stay: 2 days  Code Status: Full Code   Attending Provider: Jayesh Allen MD  Primary Care Physician: Edgar Nova MD   Principal Problem:Acute confusional state      Subjective:     Interval History:   Seems more alert and oriented. Making appropriate conversation but is a bit agitated.   10/18: Patient has reportedly been paranoid and talking inappropriately since yesterday. Makes conversation that does not make sense.  Vitals stable.      Current Neurological Medications:     Current Facility-Administered Medications   Medication Dose Route Frequency Provider Last Rate Last Dose    acetaminophen tablet 650 mg  650 mg Oral Q4H PRN Teresita Horton MD        alteplase injection 2 mg  2 mg Intra-Catheter PRN Shana Love MD        aspirin EC tablet 81 mg  81 mg Oral Daily Teresita Horton MD   81 mg at 10/18/18 0845    atenolol tablet 50 mg  50 mg Oral Daily Teresita Horton MD   50 mg at 10/18/18 0846    atorvastatin tablet 40 mg  40 mg Oral Daily Teresita Horton MD   40 mg at 10/18/18 0846    azaTHIOprine tablet 150 mg  150 mg Oral Daily Teresita Horton MD   150 mg at 10/18/18 0846    dextrose 50% injection 12.5 g  12.5 g Intravenous PRN Teresita Horton MD        dextrose 50% injection 25 g  25 g Intravenous PRN Teresita Horton MD        diltiaZEM 24 hr capsule 240 mg  240 mg Oral Daily Teresita Horton MD   240 mg at 10/18/18 0845    enoxaparin injection 40 mg  40 mg Subcutaneous Daily Jayesh Allen MD   40 mg at 10/18/18 1653    glucagon (human recombinant) injection 1 mg  1 mg Intramuscular PRN Teresita Horton MD        glucose chewable tablet 16 g  16 g Oral PRN Teresita Horton MD        glucose chewable tablet 24 g  24 g Oral PRN Teresita Horton MD        heparin, porcine (PF) 100 unit/mL injection flush 500 Units  500 Units  Intravenous PRN Shana Love MD        insulin aspart U-100 pen 1-10 Units  1-10 Units Subcutaneous QID (AC + HS) PRN Jayesh Allen MD   6 Units at 10/18/18 1700    insulin aspart U-100 pen 25 Units  25 Units Subcutaneous TID WM Jayesh Allen MD   25 Units at 10/18/18 1700    insulin detemir U-100 pen 50 Units  50 Units Subcutaneous BID Jayesh Allen MD   50 Units at 10/18/18 0856    irbesartan tablet 300 mg  300 mg Oral QHS Teresita Horton MD   300 mg at 10/17/18 2147    ondansetron disintegrating tablet 8 mg  8 mg Oral Q8H PRN Teresita Horton MD        sodium chloride 0.9% flush 10 mL  10 mL Intravenous PRN Shana Love MD        sodium chloride 0.9% flush 5 mL  5 mL Intravenous PRN Teresita Horton MD        vitamin D 1000 units tablet 5,000 Units  5,000 Units Oral Daily Teresita Horton MD   5,000 Units at 10/18/18 0852       Review of Systems   Constitutional: Negative for chills, fatigue and fever.   HENT: Negative for trouble swallowing.    Eyes: Negative for visual disturbance.   Respiratory: Negative for shortness of breath.    Cardiovascular: Negative for chest pain.   Gastrointestinal: Negative for abdominal pain.   Neurological: Negative for dizziness, weakness, numbness and headaches.   Psychiatric/Behavioral: Positive for agitation. Negative for confusion.     Objective:     Vital Signs (Most Recent):  Temp: 97.5 °F (36.4 °C) (10/18/18 1527)  Pulse: 66 (10/18/18 1527)  Resp: 18 (10/18/18 1527)  BP: 119/65 (10/18/18 1527)  SpO2: 96 % (10/18/18 1527) Vital Signs (24h Range):  Temp:  [97.5 °F (36.4 °C)-98 °F (36.7 °C)] 97.5 °F (36.4 °C)  Pulse:  [66-73] 66  Resp:  [16-18] 18  SpO2:  [96 %-99 %] 96 %  BP: (119-159)/(62-89) 119/65     Weight: 102.4 kg (225 lb 12.8 oz)  Body mass index is 33.34 kg/m².    Physical Exam   Constitutional: He is oriented to person, place, and time. No distress.   Eyes: EOM are normal. Pupils are equal, round, and reactive to light.   Musculoskeletal:  He exhibits no edema.   Neurological: He is alert and oriented to person, place, and time.   Reflex Scores:       Bicep reflexes are 2+ on the right side and 2+ on the left side.       Brachioradialis reflexes are 2+ on the right side and 2+ on the left side.       Patellar reflexes are 2+ on the right side and 2+ on the left side.       Achilles reflexes are 1+ on the right side and 1+ on the left side.  Psychiatric: His speech is normal.   Nursing note and vitals reviewed.      NEUROLOGICAL EXAMINATION:     MENTAL STATUS   Oriented to person, place, and time.   Oriented to person.   Oriented to place.   Oriented to time.   Speech: speech is normal     CRANIAL NERVES     CN III, IV, VI   Pupils are equal, round, and reactive to light.  Extraocular motions are normal.     CN V   Facial sensation intact.     CN VII   Facial expression full, symmetric.     CN IX, X   Palate: symmetric    CN XII   CN XII normal.     MOTOR EXAM   Muscle bulk: normal  Overall muscle tone: normal    Strength   Right deltoid: 5/5  Left deltoid: 5/5  Right biceps: 5/5  Left biceps: 5/5  Right peroneal: 5/5  Left peroneal: 5/5  Right gastroc: 5/5  Left gastroc: 5/5    REFLEXES     Reflexes   Right brachioradialis: 2+  Left brachioradialis: 2+  Right biceps: 2+  Left biceps: 2+  Right patellar: 2+  Left patellar: 2+  Right achilles: 1+  Left achilles: 1+    GAIT AND COORDINATION     Tremor   Resting tremor: absent      Significant Labs:   Recent Lab Results       10/18/18  1658   10/18/18  1651   10/18/18  1125   10/18/18  0835   10/18/18  0346        Immature Granulocytes         0.3     Immature Grans (Abs)         0.03  Comment:  Mild elevation in immature granulocytes is non specific and   can be seen in a variety of conditions including stress response,   acute inflammation, trauma and pregnancy. Correlation with other   laboratory and clinical findings is essential.       Anion Gap         6     Baso #         0.00     Basophil%          0.0     BUN, Bld         27     Calcium         8.9     Chloride         107     CO2         25     Creatinine         1.1     Differential Method         Automated     eGFR if          >60.0     eGFR if non          >60.0  Comment:  Calculation used to obtain the estimated glomerular filtration  rate (eGFR) is the CKD-EPI equation.        Eos #         0.0     Eosinophil%         0.0     Glucose         370     Gran # (ANC)         7.6     Gran%         86.5     Hematocrit         30.7     Hemoglobin         10.4     Lactate, Jonatan               Lymph #         0.5     Lymph%         5.3     Magnesium         2.0     MCH         29.5     MCHC         33.9     MCV         87     Mono #         0.7     Mono%         7.9     MPV         10.0     nRBC         0     Phosphorus         2.9     Platelets         209     POCT Glucose 280 242 265 256       Potassium         4.4     RBC         3.52     RDW         15.1     Sodium         138     WBC         8.75                      10/17/18  2156   10/17/18  2153   10/17/18  1947        Immature Granulocytes           Immature Grans (Abs)           Anion Gap           Baso #           Basophil%           BUN, Bld           Calcium           Chloride           CO2           Creatinine           Differential Method           eGFR if            eGFR if non            Eos #           Eosinophil%           Glucose           Gran # (ANC)           Gran%           Hematocrit           Hemoglobin     10.1     Lactate, Jonatan     2.7  Comment:  Falsely low lactic acid results can be found in samples   containing >=13.0 mg/dL total bilirubin and/or >=3.5 mg/dL   direct bilirubin.       Lymph #           Lymph%           Magnesium           MCH           MCHC           MCV           Mono #           Mono%           MPV           nRBC           Phosphorus           Platelets           POCT Glucose 365 458       Potassium            RBC           RDW           Sodium           WBC               All pertinent lab results from the past 24 hours have been reviewed.    Significant Imaging: I have reviewed all pertinent imaging results/findings within the past 24 hours.    Assessment and Plan:     * Acute confusional state    - Developed acute onset confusion on 10/17 likely 2/2 steroids + dehydration (prerenal) + hyperglycemia  - Stroke code called due to concern for stroke. MRI unremarkable for acute infarct.  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 Has been on Imuran since the past 2-3 weeks. Per the wife, has had some personality changes with increased lethargy, confusion since switching over to Imuran. MRI brain done on 9/14 shows supratentorial and infratentorial white matter changes with superimposed infarcts unchanged from prior imaging on 4/2.    - On exam, he is A&O x3, recal is 1/3+0 with prompting, Luria intact, unable to do serial 7s or count the months backwards. No motor/sensory deficits. DTRS 1+ in the lower extremities. Vibration and proprioception intact. No cerebellar signs.  - CT head shows patchy and confluent hypoattenuation in the deep cerebral and periventricular white matter in keeping with chronic microvascular ischemic change with superimposed remote lacunar type infarcts    Assessment and Recommendations:  - Received 2 doses of pulse steroids.   - After speaking with Dr. Love, planned to restart the patient on Cytoxan. Scheduled to receive the first dose inpatient on 10/18. Will receive subsequent dose outpatient likely once a month.  - Continue to monitor.         VTE Risk Mitigation (From admission, onward)        Ordered     heparin, porcine (PF) 100 unit/mL injection flush 500 Units  As needed (PRN)      10/17/18 1648     enoxaparin injection 40 mg  Daily       10/16/18 1800     Place sequential compression device  Until discontinued      10/15/18 1344     Place ELIZABETH hose  Until discontinued      10/15/18 1344     IP VTE HIGH RISK PATIENT  Once      10/15/18 1344          Abe Kwok MD  Neurology  [facility]

## 2018-10-18 NOTE — ASSESSMENT & PLAN NOTE
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 Has been on Imuran since the past 2-3 weeks. Per the wife, has had some personality changes with increased lethargy, confusion since switching over to Imuran. MRI brain done on 9/14 shows supratentorial and infratentorial white matter changes with superimposed infarcts unchanged from prior imaging on 4/2.    - On exam, he is A&O x3, recal is 1/3+0 with prompting, Luria intact, unable to do serial 7s or count the months backwards. No motor/sensory deficits. DTRS 1+ in the lower extremities. Vibration and proprioception intact. No cerebellar signs.  - CT head shows patchy and confluent hypoattenuation in the deep cerebral and periventricular white matter in keeping with chronic microvascular ischemic change with superimposed remote lacunar type infarcts    Assessment and Recommendations:  - Was alert on AM exam but became more disoriented as the day progressed. By the afternoon, patient was lethargic and not making any conversation.  - Received 2 doses of pulse steroids.   - After speaking with Dr. Love, planned to restart the patient on Cytoxan. Scheduled to receive the first dose inpatient on 10/18. Will receive subsequent dose outpatient likely once a month.  - Continue to monitor.

## 2018-10-18 NOTE — PLAN OF CARE
Problem: Patient Care Overview  Goal: Plan of Care Review  Outcome: Ongoing (interventions implemented as appropriate)  Patient remains free from falls and injury this shift. Bed in low, locked position with call bell in reach. Family at bedside. Bed alarm active. Patient encouraged to call for assistance when getting out of bed. Patient verbalized understanding. All belongings within reach. Vs stable. Afebrile. No complaints thus far. Neuro check monitored q4 hrs. AAOx4. Blood glucose monitored ACHS, sliding scale and schedule Levemir given. Condom catheter in place. Will continue to monitor.

## 2018-10-18 NOTE — PLAN OF CARE
Edgar Nova MD     Payor: BLUE CROSS BLUE SHIELD / Plan: [place] HMO / Product Type: HMO /      Extended Emergency Contact Information  Primary Emergency Contact: Bekah Nunez  Address: [address] [place] [id number] [facility] [place]  Mobile Phone: [phone]  Relation: Spouse  Secondary Emergency Contact: Marky Nunez   [place]  Mobile Phone: [phone]  Relation: Son        10/18/18 1520   Discharge Assessment   Assessment Type Discharge Planning Assessment   Confirmed/corrected address and phone number on facesheet? Yes   Assessment information obtained from? Patient   Expected Length of Stay (days) 3   Communicated expected length of stay with patient/caregiver yes   Prior to hospitilization cognitive status: Alert/Oriented   Prior to hospitalization functional status: Assistive Equipment   Current cognitive status: Alert/Oriented   Current Functional Status: Assistive Equipment;Needs Assistance   Lives With spouse   Able to Return to Prior Arrangements yes   Is patient able to care for self after discharge? Yes   Patient's perception of discharge disposition home or selfcare   Readmission Within The Last 30 Days no previous admission in last 30 days   Patient currently being followed by outpatient case management? No   Patient currently receives any other outside agency services? No   Equipment Currently Used at Home bath bench;rollator   Do you have any problems affording any of your prescribed medications? No   Is the patient taking medications as prescribed? yes   Does the patient have transportation home? Yes   Transportation Available family or friend will provide   Does the patient receive services at the [facility]? No   Discharge Plan A Home with family;Home Health   Discharge Plan B Home with family   Patient/Family In Agreement With Plan yes

## 2018-10-18 NOTE — SUBJECTIVE & OBJECTIVE
Subjective:     Interval History:   Seems more alert and oriented. Making appropriate conversation but is a bit agitated.   10/18: Patient has reportedly been paranoid and talking inappropriately since yesterday. Makes conversation that does not make sense.  Vitals stable.      Current Neurological Medications:     Current Facility-Administered Medications   Medication Dose Route Frequency Provider Last Rate Last Dose    acetaminophen tablet 650 mg  650 mg Oral Q4H PRN Teresita Horton MD        alteplase injection 2 mg  2 mg Intra-Catheter PRN Shana Love MD        aspirin EC tablet 81 mg  81 mg Oral Daily Teresita Horton MD   81 mg at 10/18/18 0845    atenolol tablet 50 mg  50 mg Oral Daily Teresita Horton MD   50 mg at 10/18/18 0846    atorvastatin tablet 40 mg  40 mg Oral Daily Teresita Horton MD   40 mg at 10/18/18 0846    azaTHIOprine tablet 150 mg  150 mg Oral Daily Teresita Horton MD   150 mg at 10/18/18 0846    dextrose 50% injection 12.5 g  12.5 g Intravenous PRN Teresita Horton MD        dextrose 50% injection 25 g  25 g Intravenous PRN Teresita Horton MD        diltiaZEM 24 hr capsule 240 mg  240 mg Oral Daily Teresita Horton MD   240 mg at 10/18/18 0845    enoxaparin injection 40 mg  40 mg Subcutaneous Daily Jayesh Allen MD   40 mg at 10/18/18 1653    glucagon (human recombinant) injection 1 mg  1 mg Intramuscular PRN Teresita Horton MD        glucose chewable tablet 16 g  16 g Oral PRN Teresita Horton MD        glucose chewable tablet 24 g  24 g Oral PRN Teresita Horton MD        heparin, porcine (PF) 100 unit/mL injection flush 500 Units  500 Units Intravenous PRN Shana Love MD        insulin aspart U-100 pen 1-10 Units  1-10 Units Subcutaneous QID (AC + HS) PRN Jayesh Allen MD   6 Units at 10/18/18 1700    insulin aspart U-100 pen 25 Units  25 Units Subcutaneous TID WM Jayesh Allen MD   25 Units at 10/18/18 1700    insulin detemir U-100 pen 50  Units  50 Units Subcutaneous BID Jayesh Allen MD   50 Units at 10/18/18 0856    irbesartan tablet 300 mg  300 mg Oral QHS Teresita LUBNA Horton MD   300 mg at 10/17/18 2147    ondansetron disintegrating tablet 8 mg  8 mg Oral Q8H PRN Teresita Horton MD        sodium chloride 0.9% flush 10 mL  10 mL Intravenous PRN Shana Love MD        sodium chloride 0.9% flush 5 mL  5 mL Intravenous PRN Teresita LUBNA Horton MD        vitamin D 1000 units tablet 5,000 Units  5,000 Units Oral Daily Teresita LUBNA Horton MD   5,000 Units at 10/18/18 0852       Review of Systems   Constitutional: Negative for chills, fatigue and fever.   HENT: Negative for trouble swallowing.    Eyes: Negative for visual disturbance.   Respiratory: Negative for shortness of breath.    Cardiovascular: Negative for chest pain.   Gastrointestinal: Negative for abdominal pain.   Neurological: Negative for dizziness, weakness, numbness and headaches.   Psychiatric/Behavioral: Positive for agitation. Negative for confusion.     Objective:     Vital Signs (Most Recent):  Temp: 97.5 °F (36.4 °C) (10/18/18 1527)  Pulse: 66 (10/18/18 1527)  Resp: 18 (10/18/18 1527)  BP: 119/65 (10/18/18 1527)  SpO2: 96 % (10/18/18 1527) Vital Signs (24h Range):  Temp:  [97.5 °F (36.4 °C)-98 °F (36.7 °C)] 97.5 °F (36.4 °C)  Pulse:  [66-73] 66  Resp:  [16-18] 18  SpO2:  [96 %-99 %] 96 %  BP: (119-159)/(62-89) 119/65     Weight: 102.4 kg (225 lb 12.8 oz)  Body mass index is 33.34 kg/m².    Physical Exam   Constitutional: He is oriented to person, place, and time. No distress.   Eyes: EOM are normal. Pupils are equal, round, and reactive to light.   Musculoskeletal: He exhibits no edema.   Neurological: He is alert and oriented to person, place, and time.   Reflex Scores:       Bicep reflexes are 2+ on the right side and 2+ on the left side.       Brachioradialis reflexes are 2+ on the right side and 2+ on the left side.       Patellar reflexes are 2+ on the right side and 2+ on  the left side.       Achilles reflexes are 1+ on the right side and 1+ on the left side.  Psychiatric: His speech is normal.   Nursing note and vitals reviewed.      NEUROLOGICAL EXAMINATION:     MENTAL STATUS   Oriented to person, place, and time.   Oriented to person.   Oriented to place.   Oriented to time.   Speech: speech is normal     CRANIAL NERVES     CN III, IV, VI   Pupils are equal, round, and reactive to light.  Extraocular motions are normal.     CN V   Facial sensation intact.     CN VII   Facial expression full, symmetric.     CN IX, X   Palate: symmetric    CN XII   CN XII normal.     MOTOR EXAM   Muscle bulk: normal  Overall muscle tone: normal    Strength   Right deltoid: 5/5  Left deltoid: 5/5  Right biceps: 5/5  Left biceps: 5/5  Right peroneal: 5/5  Left peroneal: 5/5  Right gastroc: 5/5  Left gastroc: 5/5    REFLEXES     Reflexes   Right brachioradialis: 2+  Left brachioradialis: 2+  Right biceps: 2+  Left biceps: 2+  Right patellar: 2+  Left patellar: 2+  Right achilles: 1+  Left achilles: 1+    GAIT AND COORDINATION     Tremor   Resting tremor: absent      Significant Labs:   Recent Lab Results       10/18/18  1658   10/18/18  1651   10/18/18  1125   10/18/18  0835   10/18/18  0346        Immature Granulocytes         0.3     Immature Grans (Abs)         0.03  Comment:  Mild elevation in immature granulocytes is non specific and   can be seen in a variety of conditions including stress response,   acute inflammation, trauma and pregnancy. Correlation with other   laboratory and clinical findings is essential.       Anion Gap         6     Baso #         0.00     Basophil%         0.0     BUN, Bld         27     Calcium         8.9     Chloride         107     CO2         25     Creatinine         1.1     Differential Method         Automated     eGFR if          >60.0     eGFR if non          >60.0  Comment:  Calculation used to obtain the estimated glomerular  filtration  rate (eGFR) is the CKD-EPI equation.        Eos #         0.0     Eosinophil%         0.0     Glucose         370     Gran # (ANC)         7.6     Gran%         86.5     Hematocrit         30.7     Hemoglobin         10.4     Lactate, Jonatan               Lymph #         0.5     Lymph%         5.3     Magnesium         2.0     MCH         29.5     MCHC         33.9     MCV         87     Mono #         0.7     Mono%         7.9     MPV         10.0     nRBC         0     Phosphorus         2.9     Platelets         209     POCT Glucose 280 242 265 256       Potassium         4.4     RBC         3.52     RDW         15.1     Sodium         138     WBC         8.75                      10/17/18  2156   10/17/18  2153   10/17/18  1947        Immature Granulocytes           Immature Grans (Abs)           Anion Gap           Baso #           Basophil%           BUN, Bld           Calcium           Chloride           CO2           Creatinine           Differential Method           eGFR if            eGFR if non            Eos #           Eosinophil%           Glucose           Gran # (ANC)           Gran%           Hematocrit           Hemoglobin     10.1     Lactate, Jonatan     2.7  Comment:  Falsely low lactic acid results can be found in samples   containing >=13.0 mg/dL total bilirubin and/or >=3.5 mg/dL   direct bilirubin.       Lymph #           Lymph%           Magnesium           MCH           MCHC           MCV           Mono #           Mono%           MPV           nRBC           Phosphorus           Platelets           POCT Glucose 365 458       Potassium           RBC           RDW           Sodium           WBC               All pertinent lab results from the past 24 hours have been reviewed.    Significant Imaging: I have reviewed all pertinent imaging results/findings within the past 24 hours.

## 2018-10-18 NOTE — PROGRESS NOTES
Hospital Medicine   Progress Note     Team: [facility] [place] [facility] Jayesh Allen MD   Admit Date: 10/15/2018   BELIA    Code status: Full Code   Principal Problem: Acute confusional state     Interval hx: Patient seen and examined at bedside today. Overnight: ROVERTO. This AM, patient much more awake and conversant. Tangential speech but fully oriented and answers most questions appropriately - significant change compare to yesterday. BG's under better control today (mid 200's). Plan for cyclophosphamide infusion this afternoon.     ROS   Constitutional: no fever or chills  Respiratory: no cough or shortness of breath  Cardiovascular: no chest pain or palpitations  Gastrointestinal: no nausea or vomiting, no abdominal pain or change in bowel habits  Musculoskeletal: no arthralgias or myalgias  Neurological: no seizures or tremors    PEx   Temp:  [97.4 °F (36.3 °C)-98.3 °F (36.8 °C)]   Pulse:  [68-79]   Resp:  [15-18]   BP: (128-217)/(62-88)   SpO2:  [96 %-99 %]      I & O (Last 24H):     Intake/Output Summary (Last 24 hours) at 10/18/2018 0833  Last data filed at 10/17/2018 1808  Gross per 24 hour   Intake 1303.33 ml   Output --   Net 1303.33 ml     General appearance: NAD, cooperative, alert and oriented x 4  Lungs: clear to auscultation bilaterally, no wheezes, no crackles, no rales, no rhonchi        Heart: regular rate and rhythm, S1, S2 normal, no murmur              Abdomen: Soft, non-tender, non-distended, normoactive bowel sounds. No masses, no organomegaly    Extremities: atraumatic, no deformities, no cyanosis or edema                                Neurologic: awake and oriented x4, moves all extremities 5/5  strength, no slurred speech  Skin: No rash, tears, or ecchymosis    Recent Results (from the past 24 hour(s))   POCT glucose    Collection Time: 10/17/18  9:12 AM   Result Value Ref Range    POCT Glucose 408 (H) 70 - 110 mg/dL   POCT glucose    Collection Time: 10/17/18  1:34 PM   Result Value Ref Range     POCT Glucose 317 (H) 70 - 110 mg/dL   POCT glucose    Collection Time: 10/17/18  1:59 PM   Result Value Ref Range    POCT Glucose 294 (H) 70 - 110 mg/dL   Basic metabolic panel    Collection Time: 10/17/18  2:25 PM   Result Value Ref Range    Sodium 139 136 - 145 mmol/L    Potassium 4.0 3.5 - 5.1 mmol/L    Chloride 108 95 - 110 mmol/L    CO2 23 23 - 29 mmol/L    Glucose 312 (H) 70 - 110 mg/dL    BUN, Bld 26 (H) 6 - 20 mg/dL    Creatinine 1.2 0.5 - 1.4 mg/dL    Calcium 9.5 8.7 - 10.5 mg/dL    Anion Gap 8 8 - 16 mmol/L    eGFR if African American >60.0 >60 mL/min/1.73 m^2    eGFR if non African American >60.0 >60 mL/min/1.73 m^2   Lactic acid, plasma    Collection Time: 10/17/18  2:25 PM   Result Value Ref Range    Lactate (Lactic Acid) 2.7 (H) 0.5 - 2.2 mmol/L   CBC auto differential    Collection Time: 10/17/18  2:25 PM   Result Value Ref Range    WBC 8.14 3.90 - 12.70 K/uL    RBC 3.56 (L) 4.60 - 6.20 M/uL    Hemoglobin 10.4 (L) 14.0 - 18.0 g/dL    Hematocrit 30.6 (L) 40.0 - 54.0 %    MCV 86 82 - 98 fL    MCH 29.2 27.0 - 31.0 pg    MCHC 34.0 32.0 - 36.0 g/dL    RDW 14.6 (H) 11.5 - 14.5 %    Platelets 175 150 - 350 K/uL    MPV 9.4 9.2 - 12.9 fL    Immature Granulocytes 0.4 0.0 - 0.5 %    Gran # (ANC) 7.4 1.8 - 7.7 K/uL    Immature Grans (Abs) 0.03 0.00 - 0.04 K/uL    Lymph # 0.4 (L) 1.0 - 4.8 K/uL    Mono # 0.4 0.3 - 1.0 K/uL    Eos # 0.0 0.0 - 0.5 K/uL    Baso # 0.00 0.00 - 0.20 K/uL    nRBC 0 0 /100 WBC    Gran% 91.0 (H) 38.0 - 73.0 %    Lymph% 4.3 (L) 18.0 - 48.0 %    Mono% 4.3 4.0 - 15.0 %    Eosinophil% 0.0 0.0 - 8.0 %    Basophil% 0.0 0.0 - 1.9 %    Differential Method Automated    Blood culture    Collection Time: 10/17/18  5:43 PM   Result Value Ref Range    Blood Culture, Routine No Growth to date    POCT glucose    Collection Time: 10/17/18  5:49 PM   Result Value Ref Range    POCT Glucose 230 (H) 70 - 110 mg/dL   Blood culture    Collection Time: 10/17/18  5:50 PM   Result Value Ref Range    Blood Culture,  Routine No Growth to date    Lactic acid, plasma    Collection Time: 10/17/18  7:47 PM   Result Value Ref Range    Lactate (Lactic Acid) 2.7 (H) 0.5 - 2.2 mmol/L   Hemoglobin    Collection Time: 10/17/18  7:47 PM   Result Value Ref Range    Hemoglobin 10.1 (L) 14.0 - 18.0 g/dL   POCT glucose    Collection Time: 10/17/18  9:53 PM   Result Value Ref Range    POCT Glucose 458 (HH) 70 - 110 mg/dL   POCT glucose    Collection Time: 10/17/18  9:56 PM   Result Value Ref Range    POCT Glucose 365 (H) 70 - 110 mg/dL   Basic Metabolic Panel (BMP)    Collection Time: 10/18/18  3:46 AM   Result Value Ref Range    Sodium 138 136 - 145 mmol/L    Potassium 4.4 3.5 - 5.1 mmol/L    Chloride 107 95 - 110 mmol/L    CO2 25 23 - 29 mmol/L    Glucose 370 (H) 70 - 110 mg/dL    BUN, Bld 27 (H) 6 - 20 mg/dL    Creatinine 1.1 0.5 - 1.4 mg/dL    Calcium 8.9 8.7 - 10.5 mg/dL    Anion Gap 6 (L) 8 - 16 mmol/L    eGFR if African American >60.0 >60 mL/min/1.73 m^2    eGFR if non African American >60.0 >60 mL/min/1.73 m^2   Magnesium    Collection Time: 10/18/18  3:46 AM   Result Value Ref Range    Magnesium 2.0 1.6 - 2.6 mg/dL   Phosphorus    Collection Time: 10/18/18  3:46 AM   Result Value Ref Range    Phosphorus 2.9 2.7 - 4.5 mg/dL   CBC with Automated Differential    Collection Time: 10/18/18  3:46 AM   Result Value Ref Range    WBC 8.75 3.90 - 12.70 K/uL    RBC 3.52 (L) 4.60 - 6.20 M/uL    Hemoglobin 10.4 (L) 14.0 - 18.0 g/dL    Hematocrit 30.7 (L) 40.0 - 54.0 %    MCV 87 82 - 98 fL    MCH 29.5 27.0 - 31.0 pg    MCHC 33.9 32.0 - 36.0 g/dL    RDW 15.1 (H) 11.5 - 14.5 %    Platelets 209 150 - 350 K/uL    MPV 10.0 9.2 - 12.9 fL    Immature Granulocytes 0.3 0.0 - 0.5 %    Gran # (ANC) 7.6 1.8 - 7.7 K/uL    Immature Grans (Abs) 0.03 0.00 - 0.04 K/uL    Lymph # 0.5 (L) 1.0 - 4.8 K/uL    Mono # 0.7 0.3 - 1.0 K/uL    Eos # 0.0 0.0 - 0.5 K/uL    Baso # 0.00 0.00 - 0.20 K/uL    nRBC 0 0 /100 WBC    Gran% 86.5 (H) 38.0 - 73.0 %    Lymph% 5.3 (L) 18.0 -  48.0 %    Mono% 7.9 4.0 - 15.0 %    Eosinophil% 0.0 0.0 - 8.0 %    Basophil% 0.0 0.0 - 1.9 %    Differential Method Automated       aspirin  81 mg Oral Daily    atenolol  50 mg Oral Daily    atorvastatin  40 mg Oral Daily    azaTHIOprine  150 mg Oral Daily    cyclophosphamide (CYTOXAN) chemo infusion  750 mg/m2 Intravenous 1 time in Clinic/HOD    diltiaZEM  240 mg Oral Daily    enoxaparin  40 mg Subcutaneous Daily    insulin aspart U-100  25 Units Subcutaneous TID WM    insulin detemir U-100  50 Units Subcutaneous BID    irbesartan  300 mg Oral QHS    mesna (MESNEX) infusion  325 mg/m2 Intravenous 1 time in Clinic/HOD    mesna (MESNEX) infusion  325 mg/m2 Intravenous 1 time in Clinic/HOD    vitamin D  5,000 Units Oral Daily       acetaminophen, alteplase, dextrose 50%, dextrose 50%, glucagon (human recombinant), glucose, glucose, heparin, porcine (PF), insulin aspart U-100, ondansetron, sodium chloride 0.9%, sodium chloride 0.9%    Assessment & Plan:  Mr. Nunez is a 58 year old man with a hx of Primary CNS Vasculitis , IDDM2, Hx of CVA who presented to the ED following worsening confusion and a mechanical fall at home. Admitted to Hospital Medicine, and Neurology consulted due to concern for flare of known CNS Vasculitis. Plan to coordinate inpatient then outpatient resumption of Cyclophosphamide infusions, given previous clinical improvement on this regimen.    Primary CNS Vasculitis:  Acute Encephalopathy, Improved  Mechanical Falls  -pt presenting with recurrent falls, confusion, instability and other symptoms similar to his presentation of CNS vasculitis in the past. Symptoms started after he was switched from cyclophosphamide to azothioprine  -Initial CT head negative for acute abnormalities. MRI performed as part of stroke activation (10/17) negative for acute infarct or bleed. Appreciate Vascular Neurology assistance  - Plan to start Cyclophosphamide infusions this afternoon, appreciate  multi-disciplinary effort  - Metabolic workup: RPR: NR, UDS: negative, UA: no evidence of infection, TSH wnl's, Vitamin B12 wnl's, BG elevated but improving with increase Insulin dosing, no evidence of DKA.   - BCx's and UCx's (10/18) NGTD  - Final EEG report (10/15) pending  -continue azathioprine for now  -Neurochecks with VS  -PT/OT, progressive mobility  -fall precautions   -Maintain Delirium precautions     Insulin Dependent Diabetes Mellitus, Well Controlled  Long-Term Use of Insulin  Glucocorticoid-Induced Hyperglycemia  -Last HbA1c: 6.2 on 10/15/18  -hold PTA metformin  -SSI with accuchecks qACHS  -Increased Levemir to 50 units BID, Aspart to 25 units with meals, MD GEORGES insulin every 4 hours (eating pt protocol)  -diabetic diet     Hypertension:  -continue atenolol, diltiazem, irbesartan  -well controlled     Depression:  -hold venlafazine for now as this is a new medication that was started few days ago that could be contributing to his symptoms      Dyslipidemia:  -continue atorvastatin     Hx of CVA:  -cont asa and atorvastatin       DVT PPx: Lovenox 40 mg daily  Diet: Diabetic  Dispo: Pending Cyclophosphamide infusion and hopeful improvement in mental status      Jayesh Allen MD  Hospital Medicine Staff  [facility]   Pager: [phone]

## 2018-10-19 PROBLEM — F05 ACUTE CONFUSIONAL STATE: Status: RESOLVED | Noted: 2017-05-14 | Resolved: 2018-10-19

## 2018-10-19 LAB
ANION GAP SERPL CALC-SCNC: 6 MMOL/L
BACTERIA UR CULT: NORMAL
BASOPHILS # BLD AUTO: 0 K/UL
BASOPHILS NFR BLD: 0 %
BUN SERPL-MCNC: 20 MG/DL
CALCIUM SERPL-MCNC: 8.1 MG/DL
CHLORIDE SERPL-SCNC: 110 MMOL/L
CO2 SERPL-SCNC: 24 MMOL/L
CREAT SERPL-MCNC: 0.9 MG/DL
DIFFERENTIAL METHOD: ABNORMAL
EOSINOPHIL # BLD AUTO: 0 K/UL
EOSINOPHIL NFR BLD: 0 %
ERYTHROCYTE [DISTWIDTH] IN BLOOD BY AUTOMATED COUNT: 14.9 %
EST. GFR  (AFRICAN AMERICAN): >60 ML/MIN/1.73 M^2
EST. GFR  (NON AFRICAN AMERICAN): >60 ML/MIN/1.73 M^2
GLUCOSE SERPL-MCNC: 213 MG/DL
HCT VFR BLD AUTO: 31.6 %
HGB BLD-MCNC: 10.7 G/DL
IMM GRANULOCYTES # BLD AUTO: 0.06 K/UL
IMM GRANULOCYTES NFR BLD AUTO: 1 %
LYMPHOCYTES # BLD AUTO: 0.9 K/UL
LYMPHOCYTES NFR BLD: 14.9 %
MAGNESIUM SERPL-MCNC: 1.8 MG/DL
MCH RBC QN AUTO: 29.3 PG
MCHC RBC AUTO-ENTMCNC: 33.9 G/DL
MCV RBC AUTO: 87 FL
MONOCYTES # BLD AUTO: 0.8 K/UL
MONOCYTES NFR BLD: 13.6 %
NEUTROPHILS # BLD AUTO: 4.3 K/UL
NEUTROPHILS NFR BLD: 70.5 %
NRBC BLD-RTO: 0 /100 WBC
PHOSPHATE SERPL-MCNC: 2.3 MG/DL
PLATELET # BLD AUTO: 177 K/UL
PMV BLD AUTO: 9.9 FL
POCT GLUCOSE: 125 MG/DL (ref 70–110)
POCT GLUCOSE: 154 MG/DL (ref 70–110)
POCT GLUCOSE: 177 MG/DL (ref 70–110)
POCT GLUCOSE: 181 MG/DL (ref 70–110)
POTASSIUM SERPL-SCNC: 3.5 MMOL/L
RBC # BLD AUTO: 3.65 M/UL
SODIUM SERPL-SCNC: 140 MMOL/L
WBC # BLD AUTO: 6.12 K/UL

## 2018-10-19 PROCEDURE — 63600175 PHARM REV CODE 636 W HCPCS: Performed by: HOSPITALIST

## 2018-10-19 PROCEDURE — 80048 BASIC METABOLIC PNL TOTAL CA: CPT

## 2018-10-19 PROCEDURE — 25000003 PHARM REV CODE 250: Performed by: HOSPITALIST

## 2018-10-19 PROCEDURE — 20600001 HC STEP DOWN PRIVATE ROOM

## 2018-10-19 PROCEDURE — 83735 ASSAY OF MAGNESIUM: CPT

## 2018-10-19 PROCEDURE — 85025 COMPLETE CBC W/AUTO DIFF WBC: CPT

## 2018-10-19 PROCEDURE — 84100 ASSAY OF PHOSPHORUS: CPT

## 2018-10-19 PROCEDURE — 99233 SBSQ HOSP IP/OBS HIGH 50: CPT | Mod: ,,, | Performed by: PSYCHIATRY & NEUROLOGY

## 2018-10-19 PROCEDURE — 99232 SBSQ HOSP IP/OBS MODERATE 35: CPT | Mod: ,,, | Performed by: HOSPITALIST

## 2018-10-19 PROCEDURE — 36415 COLL VENOUS BLD VENIPUNCTURE: CPT

## 2018-10-19 RX ORDER — INSULIN ASPART 100 [IU]/ML
30 INJECTION, SOLUTION INTRAVENOUS; SUBCUTANEOUS
Status: DISCONTINUED | OUTPATIENT
Start: 2018-10-19 | End: 2018-10-19

## 2018-10-19 RX ORDER — ONDANSETRON 2 MG/ML
4 INJECTION INTRAMUSCULAR; INTRAVENOUS EVERY 8 HOURS PRN
Status: DISCONTINUED | OUTPATIENT
Start: 2018-10-19 | End: 2018-10-20 | Stop reason: HOSPADM

## 2018-10-19 RX ORDER — INSULIN ASPART 100 [IU]/ML
15 INJECTION, SOLUTION INTRAVENOUS; SUBCUTANEOUS
Status: DISCONTINUED | OUTPATIENT
Start: 2018-10-19 | End: 2018-10-20 | Stop reason: HOSPADM

## 2018-10-19 RX ADMIN — INSULIN ASPART 3 UNITS: 100 INJECTION, SOLUTION INTRAVENOUS; SUBCUTANEOUS at 09:10

## 2018-10-19 RX ADMIN — ATENOLOL 50 MG: 25 TABLET ORAL at 09:10

## 2018-10-19 RX ADMIN — ONDANSETRON 8 MG: 8 TABLET, ORALLY DISINTEGRATING ORAL at 09:10

## 2018-10-19 RX ADMIN — VITAMIN D, TAB 1000IU (100/BT) 5000 UNITS: 25 TAB at 09:10

## 2018-10-19 RX ADMIN — DILTIAZEM HYDROCHLORIDE 240 MG: 240 CAPSULE, EXTENDED RELEASE ORAL at 09:10

## 2018-10-19 RX ADMIN — AZATHIOPRINE 150 MG: 50 TABLET ORAL at 09:10

## 2018-10-19 RX ADMIN — IRBESARTAN 300 MG: 150 TABLET ORAL at 08:10

## 2018-10-19 RX ADMIN — INSULIN ASPART 15 UNITS: 100 INJECTION, SOLUTION INTRAVENOUS; SUBCUTANEOUS at 06:10

## 2018-10-19 RX ADMIN — ASPIRIN 81 MG: 81 TABLET, COATED ORAL at 09:10

## 2018-10-19 RX ADMIN — ENOXAPARIN SODIUM 40 MG: 100 INJECTION SUBCUTANEOUS at 04:10

## 2018-10-19 RX ADMIN — INSULIN ASPART 15 UNITS: 100 INJECTION, SOLUTION INTRAVENOUS; SUBCUTANEOUS at 12:10

## 2018-10-19 RX ADMIN — ATORVASTATIN CALCIUM 40 MG: 20 TABLET, FILM COATED ORAL at 09:10

## 2018-10-19 NOTE — NURSING
Spoke with Dr. Allen today , patient's blood sugar is 125  I asked about giving him 30 units of aspart and 60 detemir .  He said hold 30 unit til he eats and give 15 units of detemir.  He was headed to huddle will write order after huddle.  Will continue to monitor patient.

## 2018-10-19 NOTE — SUBJECTIVE & OBJECTIVE
Subjective:     Interval History: No acute events overnight. S/p Cytoxan. Vitals stable.    10/18: Seems more alert and oriented. Making appropriate conversation but is a bit agitated.   10/18: Patient has reportedly been paranoid and talking inappropriately since yesterday. Makes conversation that does not make sense.  Vitals stable.      Current Neurological Medications:     Current Facility-Administered Medications   Medication Dose Route Frequency Provider Last Rate Last Dose    acetaminophen tablet 650 mg  650 mg Oral Q4H PRN Teresita Horton MD        alteplase injection 2 mg  2 mg Intra-Catheter PRN Shana Love MD        aspirin EC tablet 81 mg  81 mg Oral Daily Teresita Horton MD   81 mg at 10/19/18 0926    atenolol tablet 50 mg  50 mg Oral Daily Teresita Horton MD   50 mg at 10/19/18 0926    atorvastatin tablet 40 mg  40 mg Oral Daily Teresita Horton MD   40 mg at 10/19/18 0926    azaTHIOprine tablet 150 mg  150 mg Oral Daily Teresita Horton MD   150 mg at 10/19/18 0926    dextrose 50% injection 12.5 g  12.5 g Intravenous PRN Teresita Horton MD        dextrose 50% injection 25 g  25 g Intravenous PRN Teresita Horton MD        diltiaZEM 24 hr capsule 240 mg  240 mg Oral Daily Teresita Horton MD   240 mg at 10/19/18 0937    enoxaparin injection 40 mg  40 mg Subcutaneous Daily Jayesh Allen MD   40 mg at 10/19/18 1656    glucagon (human recombinant) injection 1 mg  1 mg Intramuscular PRN Teresita Horton MD        glucose chewable tablet 16 g  16 g Oral PRN Teresita Horton MD        glucose chewable tablet 24 g  24 g Oral PRN Teresita Horton MD        heparin, porcine (PF) 100 unit/mL injection flush 500 Units  500 Units Intravenous PRN Shana Love MD        insulin aspart U-100 pen 1-10 Units  1-10 Units Subcutaneous QID (AC + HS) PRN Jayesh Allen MD   5 Units at 10/18/18 2118    insulin aspart U-100 pen 15 Units  15 Units Subcutaneous TID  Jayesh Allen MD    15 Units at 10/19/18 1250    insulin detemir U-100 pen 20 Units  20 Units Subcutaneous BID Jayesh Allen MD        irbesartan tablet 300 mg  300 mg Oral QHS Teresita Horton MD   300 mg at 10/18/18 2109    ondansetron disintegrating tablet 8 mg  8 mg Oral Q8H PRN Teresita Horton MD   8 mg at 10/19/18 0921    ondansetron injection 4 mg  4 mg Intravenous Q8H PRN Jayesh Allen MD        sodium chloride 0.9% flush 10 mL  10 mL Intravenous PRN Shana Love MD        sodium chloride 0.9% flush 5 mL  5 mL Intravenous PRN Teresita Horton MD        vitamin D 1000 units tablet 5,000 Units  5,000 Units Oral Daily Teresita Horton MD   5,000 Units at 10/19/18 0926       Review of Systems   Constitutional: Negative for chills and fever.   HENT: Negative for trouble swallowing.    Eyes: Negative for visual disturbance.   Respiratory: Negative for shortness of breath.    Neurological: Negative for weakness and numbness.   Psychiatric/Behavioral: Negative for agitation and confusion.     Objective:     Vital Signs (Most Recent):  Temp: 97.4 °F (36.3 °C) (10/19/18 1456)  Pulse: 70 (10/19/18 1456)  Resp: 18 (10/19/18 1456)  BP: 119/60 (10/19/18 1456)  SpO2: 96 % (10/19/18 1456) Vital Signs (24h Range):  Temp:  [97.4 °F (36.3 °C)-97.6 °F (36.4 °C)] 97.4 °F (36.3 °C)  Pulse:  [66-71] 70  Resp:  [16-18] 18  SpO2:  [93 %-98 %] 96 %  BP: (119-142)/(59-80) 119/60     Weight: 100.8 kg (222 lb 1.8 oz)  Body mass index is 32.8 kg/m².    Physical Exam   Constitutional: He is oriented to person, place, and time. No distress.   Eyes: EOM are normal. Pupils are equal, round, and reactive to light.   Neurological: He is alert and oriented to person, place, and time. He has a normal Finger-Nose-Finger Test.   Reflex Scores:       Bicep reflexes are 2+ on the right side and 2+ on the left side.       Brachioradialis reflexes are 2+ on the right side and 2+ on the left side.       Patellar reflexes are 2+ on the right side and 2+  on the left side.       Achilles reflexes are 2+ on the right side and 2+ on the left side.  Psychiatric: His speech is normal.   Nursing note and vitals reviewed.      NEUROLOGICAL EXAMINATION:     MENTAL STATUS   Oriented to person, place, and time.   Oriented to person.   Oriented to place.   Oriented to time.   Registration: recalls 3 of 3 objects. Recall at 5 minutes: recalls 1 of 3 objects. Follows 2 step commands.   Attention: decreased. Concentration: decreased.   Speech: speech is normal     CRANIAL NERVES     CN III, IV, VI   Pupils are equal, round, and reactive to light.  Extraocular motions are normal.     CN V   Facial sensation intact.     CN VII   Facial expression full, symmetric.     CN IX, X   CN IX normal.     CN XI   CN XI normal.     CN XII   CN XII normal.     MOTOR EXAM   Overall muscle tone: normal    Strength   Right deltoid: 5/5  Left deltoid: 5/5  Right biceps: 5/5  Left biceps: 5/5  Right peroneal: 5/5  Left peroneal: 5/5  Right gastroc: 5/5  Left gastroc: 5/5    REFLEXES     Reflexes   Right brachioradialis: 2+  Left brachioradialis: 2+  Right biceps: 2+  Left biceps: 2+  Right patellar: 2+  Left patellar: 2+  Right achilles: 2+  Left achilles: 2+    GAIT AND COORDINATION      Coordination   Finger to nose coordination: normal      Significant Labs:   Recent Lab Results       10/19/18  1652   10/19/18  1114   10/19/18  0745   10/19/18  0453   10/18/18  2113        Immature Granulocytes       1.0       Immature Grans (Abs)       0.06  Comment:  Mild elevation in immature granulocytes is non specific and   can be seen in a variety of conditions including stress response,   acute inflammation, trauma and pregnancy. Correlation with other   laboratory and clinical findings is essential.         Anion Gap       6       Baso #       0.00       Basophil%       0.0       BUN, Bld       20       Calcium       8.1       Chloride       110       CO2       24       Creatinine       0.9        Differential Method       Automated       eGFR if        >60.0       eGFR if non        >60.0  Comment:  Calculation used to obtain the estimated glomerular filtration  rate (eGFR) is the CKD-EPI equation.          Eos #       0.0       Eosinophil%       0.0       Glucose       213       Gran # (ANC)       4.3       Gran%       70.5       Hematocrit       31.6       Hemoglobin       10.7       Lymph #       0.9       Lymph%       14.9       Magnesium       1.8       MCH       29.3       MCHC       33.9       MCV       87       Mono #       0.8       Mono%       13.6       MPV       9.9       nRBC       0       Phosphorus       2.3       Platelets       177       POCT Glucose 154 177 125   358     Potassium       3.5       RBC       3.65       RDW       14.9       Sodium       140       WBC       6.12                          All pertinent lab results from the past 24 hours have been reviewed.    Significant Imaging: I have reviewed all pertinent imaging results/findings within the past 24 hours.

## 2018-10-19 NOTE — PLAN OF CARE
Problem: Patient Care Overview  Goal: Plan of Care Review  Outcome: Ongoing (interventions implemented as appropriate)  Plan of care reviewed with patient and family.  Fall precautions maintained,side rails upx2, call light in reach,bed in low position and locked, nonskid socks on.  Accuchecks ac and hs.  Patient has no complaints voiced.  Tolerating a diabetic diet without difficulty.  Wife at the bedside.

## 2018-10-19 NOTE — ASSESSMENT & PLAN NOTE
58 yr old with CNS vasculitis , diagnosed in June 2017, has received 12 rounds of Cytoxan last dose on 7/11. Has been on Imuran since the past 2-3 weeks. Per the wife, has had some personality changes with increased lethargy, confusion since switching over to Imuran. MRI brain done on 9/14 shows supratentorial and infratentorial white matter changes with superimposed infarcts unchanged from prior imaging on 4/2.    - On exam, he is A&O x3, recal is 1/3+0 with prompting, Luria intact, unable to do serial 7s or count the months backwards. No motor/sensory deficits. DTRS 1+ in the lower extremities. Vibration and proprioception intact. No cerebellar signs.  - CT head shows patchy and confluent hypoattenuation in the deep cerebral and periventricular white matter in keeping with chronic microvascular ischemic change with superimposed remote lacunar type infarcts    Assessment and Recommendations:  - Received 2 doses of pulse steroids.   - S/p Cytoxan 750mg/m2  - Will receive further doses outpatient.

## 2018-10-19 NOTE — PROGRESS NOTES
Hospital Medicine   Progress Note     Team: [facility] [place] [facility] Jayesh Allen MD   Admit Date: 10/15/2018   BEL[facility]    Code status: Full Code   Principal Problem: Acute confusional state     Interval hx: Patient seen and examined at bedside today. Overnight: ROVERTO. This AM, patient doing much better following Cytoxin infusion. Alert and fully oriented. Answers most questions appropriately but with some tangential speech. Using the bedside commode. Tolerating diet.    ROS   Constitutional: no fever or chills  Respiratory: no cough or shortness of breath  Cardiovascular: no chest pain or palpitations  Gastrointestinal: no nausea or vomiting, no abdominal pain or change in bowel habits  Musculoskeletal: no arthralgias or myalgias  Neurological: no seizures or tremors    PEx   Temp:  [97.4 °F (36.3 °C)-97.6 °F (36.4 °C)]   Pulse:  [66-71]   Resp:  [16-18]   BP: (119-142)/(59-80)   SpO2:  [93 %-98 %]      I & O (Last 24H):     Intake/Output Summary (Last 24 hours) at 10/19/2018 1805  Last data filed at 10/19/2018 1234  Gross per 24 hour   Intake 660 ml   Output 2400 ml   Net -1740 ml     General appearance: NAD, cooperative, alert and oriented x 4  Lungs: clear to auscultation bilaterally, no wheezes, no crackles, no rales, no rhonchi        Heart: regular rate and rhythm, S1, S2 normal, no murmur              Abdomen: Soft, non-tender, non-distended, normoactive bowel sounds. No masses, no organomegaly    Extremities: atraumatic, no deformities, no cyanosis or edema                                Neurologic: awake and oriented x4, moves all extremities 5/5  strength, no slurred speech  Skin: No rash, tears, or ecchymosis    Recent Results (from the past 24 hour(s))   POCT glucose    Collection Time: 10/18/18  9:13 PM   Result Value Ref Range    POCT Glucose 358 (H) 70 - 110 mg/dL   Basic Metabolic Panel (BMP)    Collection Time: 10/19/18  4:53 AM   Result Value Ref Range    Sodium 140 136 - 145 mmol/L    Potassium 3.5 3.5 -  5.1 mmol/L    Chloride 110 95 - 110 mmol/L    CO2 24 23 - 29 mmol/L    Glucose 213 (H) 70 - 110 mg/dL    BUN, Bld 20 6 - 20 mg/dL    Creatinine 0.9 0.5 - 1.4 mg/dL    Calcium 8.1 (L) 8.7 - 10.5 mg/dL    Anion Gap 6 (L) 8 - 16 mmol/L    eGFR if African American >60.0 >60 mL/min/1.73 m^2    eGFR if non African American >60.0 >60 mL/min/1.73 m^2   Magnesium    Collection Time: 10/19/18  4:53 AM   Result Value Ref Range    Magnesium 1.8 1.6 - 2.6 mg/dL   Phosphorus    Collection Time: 10/19/18  4:53 AM   Result Value Ref Range    Phosphorus 2.3 (L) 2.7 - 4.5 mg/dL   CBC with Automated Differential    Collection Time: 10/19/18  4:53 AM   Result Value Ref Range    WBC 6.12 3.90 - 12.70 K/uL    RBC 3.65 (L) 4.60 - 6.20 M/uL    Hemoglobin 10.7 (L) 14.0 - 18.0 g/dL    Hematocrit 31.6 (L) 40.0 - 54.0 %    MCV 87 82 - 98 fL    MCH 29.3 27.0 - 31.0 pg    MCHC 33.9 32.0 - 36.0 g/dL    RDW 14.9 (H) 11.5 - 14.5 %    Platelets 177 150 - 350 K/uL    MPV 9.9 9.2 - 12.9 fL    Immature Granulocytes 1.0 (H) 0.0 - 0.5 %    Gran # (ANC) 4.3 1.8 - 7.7 K/uL    Immature Grans (Abs) 0.06 (H) 0.00 - 0.04 K/uL    Lymph # 0.9 (L) 1.0 - 4.8 K/uL    Mono # 0.8 0.3 - 1.0 K/uL    Eos # 0.0 0.0 - 0.5 K/uL    Baso # 0.00 0.00 - 0.20 K/uL    nRBC 0 0 /100 WBC    Gran% 70.5 38.0 - 73.0 %    Lymph% 14.9 (L) 18.0 - 48.0 %    Mono% 13.6 4.0 - 15.0 %    Eosinophil% 0.0 0.0 - 8.0 %    Basophil% 0.0 0.0 - 1.9 %    Differential Method Automated    POCT glucose    Collection Time: 10/19/18  7:45 AM   Result Value Ref Range    POCT Glucose 125 (H) 70 - 110 mg/dL   POCT glucose    Collection Time: 10/19/18 11:14 AM   Result Value Ref Range    POCT Glucose 177 (H) 70 - 110 mg/dL   POCT glucose    Collection Time: 10/19/18  4:52 PM   Result Value Ref Range    POCT Glucose 154 (H) 70 - 110 mg/dL      aspirin  81 mg Oral Daily    atenolol  50 mg Oral Daily    atorvastatin  40 mg Oral Daily    azaTHIOprine  150 mg Oral Daily    diltiaZEM  240 mg Oral Daily     enoxaparin  40 mg Subcutaneous Daily    insulin aspart U-100  15 Units Subcutaneous TID WM    insulin detemir U-100  20 Units Subcutaneous BID    irbesartan  300 mg Oral QHS    vitamin D  5,000 Units Oral Daily       acetaminophen, alteplase, dextrose 50%, dextrose 50%, glucagon (human recombinant), glucose, glucose, heparin, porcine (PF), insulin aspart U-100, ondansetron, ondansetron, sodium chloride 0.9%, sodium chloride 0.9%    Assessment & Plan:  Mr. Nunez is a 58 year old man with a hx of Primary CNS Vasculitis , IDDM2, Hx of CVA who presented to the ED following worsening confusion and a mechanical fall at home. Admitted to Hospital Medicine, and Neurology consulted due to concern for flare of known CNS Vasculitis. Coordinating inpatient then outpatient resumption of Cyclophosphamide infusions, given previous clinical improvement on this regimen.    Primary CNS Vasculitis:  Acute Encephalopathy, Improved  Mechanical Falls  -pt presenting with recurrent falls, confusion, instability and other symptoms similar to his presentation of CNS vasculitis in the past. Symptoms started after he was switched from cyclophosphamide to azothioprine  -Initial CT head negative for acute abnormalities. MRI performed as part of stroke activation (10/17) negative for acute infarct or bleed. Appreciate Vascular Neurology assistance  - Received Cyclophosphamide evening of 10/18. Mental status much improved following infusion, approaching previous baseline per wife at bedside  - Metabolic workup: RPR: NR, UDS: negative, UA: no evidence of infection, TSH wnl's, Vitamin B12 wnl's, BG elevated but improving with increase Insulin dosing, no evidence of DKA.   - BCx's and UCx's (10/18) NGTD  -continue azathioprine  -Neurochecks with VS  -PT/OT, progressive mobility  -fall precautions   -Maintain Delirium precautions     Insulin Dependent Diabetes Mellitus, Well Controlled  Long-Term Use of Insulin  Glucocorticoid-Induced  Hyperglycemia  -Last HbA1c: 6.2 on 10/15/18  -hold PTA metformin  -SSI with accuchecks qACHS  -BG's under much better control, steroid effect wearing off - tapered to Insulin Levemir 15 units BID and Insulin Aspart 10 units AC  -diabetic diet     Hypertension:  -continue atenolol, diltiazem, irbesartan  -well controlled     Depression:  -hold venlafazine for now as this is a new medication that was started few days ago that could be contributing to his symptoms      Dyslipidemia:  -continue atorvastatin     Hx of CVA:  -cont asa and atorvastatin       DVT PPx: Lovenox 40 mg daily  Diet: Diabetic  Dispo: Anticipate discharge tomorrow AM. Home with outpatient vs. Home PT/OT      Jayesh Allen MD  Hospital Medicine Staff  [facility]   Pager: [phone]

## 2018-10-19 NOTE — PLAN OF CARE
Problem: Fall Risk (Adult)  Intervention: Patient Rounds  Pt woke up confused 2x.  Wife remained at bedside.  Re-oriented pt.  Hard of hearing.  Reported no pain, nausea, vomiting, or diarrhea.  Delirium precautions.  Fall risk.  Up with assistance.  Bed alarm on.  Condom catheter replaced x1; secured to R thigh.  Large BM x 1.  Stable vital signs.  .  Scheduled/sliding scale insulin administered as ordered.  Neuro checks every 4 hours.  Safety precautions in place.  Call light, bedside table, and telephone in reach.  Will continue to monitor.

## 2018-10-19 NOTE — PROGRESS NOTES
[facility]  Neurology  Progress Note    Patient Name: Bessy Nunez  MRN: [id number]  Admission Date: 10/15/2018  Hospital Length of Stay: 3 days  Code Status: Full Code   Attending Provider: Jayesh Allen MD  Primary Care Physician: Edgar Nova MD   Principal Problem:Acute confusional state      Subjective:     Interval History: No acute events overnight. S/p Cytoxan. Vitals stable.    10/18: Seems more alert and oriented. Making appropriate conversation but is a bit agitated.   10/18: Patient has reportedly been paranoid and talking inappropriately since yesterday. Makes conversation that does not make sense.  Vitals stable.      Current Neurological Medications:     Current Facility-Administered Medications   Medication Dose Route Frequency Provider Last Rate Last Dose    acetaminophen tablet 650 mg  650 mg Oral Q4H PRN Teresita Horton MD        alteplase injection 2 mg  2 mg Intra-Catheter PRN Shana Love MD        aspirin EC tablet 81 mg  81 mg Oral Daily Teresita Horton MD   81 mg at 10/19/18 0926    atenolol tablet 50 mg  50 mg Oral Daily Teresita Horton MD   50 mg at 10/19/18 0926    atorvastatin tablet 40 mg  40 mg Oral Daily Teresita Horton MD   40 mg at 10/19/18 0926    azaTHIOprine tablet 150 mg  150 mg Oral Daily Teresita Horton MD   150 mg at 10/19/18 0926    dextrose 50% injection 12.5 g  12.5 g Intravenous PRN Teresita Horton MD        dextrose 50% injection 25 g  25 g Intravenous PRN Teresita Horton MD        diltiaZEM 24 hr capsule 240 mg  240 mg Oral Daily Teresita Horton MD   240 mg at 10/19/18 0937    enoxaparin injection 40 mg  40 mg Subcutaneous Daily Jayesh Allen MD   40 mg at 10/19/18 1656    glucagon (human recombinant) injection 1 mg  1 mg Intramuscular PRN Teresita Horton MD        glucose chewable tablet 16 g  16 g Oral PRN Teresita Horton MD        glucose chewable tablet 24 g  24 g Oral PRN Teresita Horton MD        heparin,  porcine (PF) 100 unit/mL injection flush 500 Units  500 Units Intravenous PRN Shana Love MD        insulin aspart U-100 pen 1-10 Units  1-10 Units Subcutaneous QID (AC + HS) PRN Jayesh Allen MD   5 Units at 10/18/18 2118    insulin aspart U-100 pen 15 Units  15 Units Subcutaneous TID WM Jayesh Allen MD   15 Units at 10/19/18 1250    insulin detemir U-100 pen 20 Units  20 Units Subcutaneous BID Jayesh Allen MD        irbesartan tablet 300 mg  300 mg Oral QHS Teresita Horton MD   300 mg at 10/18/18 2109    ondansetron disintegrating tablet 8 mg  8 mg Oral Q8H PRN Teresita Horton MD   8 mg at 10/19/18 0921    ondansetron injection 4 mg  4 mg Intravenous Q8H PRN Jayesh Allen MD        sodium chloride 0.9% flush 10 mL  10 mL Intravenous PRN Shana Love MD        sodium chloride 0.9% flush 5 mL  5 mL Intravenous PRN Teresita Horton MD        vitamin D 1000 units tablet 5,000 Units  5,000 Units Oral Daily Teresita Horton MD   5,000 Units at 10/19/18 0926       Review of Systems   Constitutional: Negative for chills and fever.   HENT: Negative for trouble swallowing.    Eyes: Negative for visual disturbance.   Respiratory: Negative for shortness of breath.    Neurological: Negative for weakness and numbness.   Psychiatric/Behavioral: Negative for agitation and confusion.     Objective:     Vital Signs (Most Recent):  Temp: 97.4 °F (36.3 °C) (10/19/18 1456)  Pulse: 70 (10/19/18 1456)  Resp: 18 (10/19/18 1456)  BP: 119/60 (10/19/18 1456)  SpO2: 96 % (10/19/18 1456) Vital Signs (24h Range):  Temp:  [97.4 °F (36.3 °C)-97.6 °F (36.4 °C)] 97.4 °F (36.3 °C)  Pulse:  [66-71] 70  Resp:  [16-18] 18  SpO2:  [93 %-98 %] 96 %  BP: (119-142)/(59-80) 119/60     Weight: 100.8 kg (222 lb 1.8 oz)  Body mass index is 32.8 kg/m².    Physical Exam   Constitutional: He is oriented to person, place, and time. No distress.   Eyes: EOM are normal. Pupils are equal, round, and reactive to light.    Neurological: He is alert and oriented to person, place, and time. He has a normal Finger-Nose-Finger Test.   Reflex Scores:       Bicep reflexes are 2+ on the right side and 2+ on the left side.       Brachioradialis reflexes are 2+ on the right side and 2+ on the left side.       Patellar reflexes are 2+ on the right side and 2+ on the left side.       Achilles reflexes are 2+ on the right side and 2+ on the left side.  Psychiatric: His speech is normal.   Nursing note and vitals reviewed.      NEUROLOGICAL EXAMINATION:     MENTAL STATUS   Oriented to person, place, and time.   Oriented to person.   Oriented to place.   Oriented to time.   Registration: recalls 3 of 3 objects. Recall at 5 minutes: recalls 1 of 3 objects. Follows 2 step commands.   Attention: decreased. Concentration: decreased.   Speech: speech is normal     CRANIAL NERVES     CN III, IV, VI   Pupils are equal, round, and reactive to light.  Extraocular motions are normal.     CN V   Facial sensation intact.     CN VII   Facial expression full, symmetric.     CN IX, X   CN IX normal.     CN XI   CN XI normal.     CN XII   CN XII normal.     MOTOR EXAM   Overall muscle tone: normal    Strength   Right deltoid: 5/5  Left deltoid: 5/5  Right biceps: 5/5  Left biceps: 5/5  Right peroneal: 5/5  Left peroneal: 5/5  Right gastroc: 5/5  Left gastroc: 5/5    REFLEXES     Reflexes   Right brachioradialis: 2+  Left brachioradialis: 2+  Right biceps: 2+  Left biceps: 2+  Right patellar: 2+  Left patellar: 2+  Right achilles: 2+  Left achilles: 2+    GAIT AND COORDINATION      Coordination   Finger to nose coordination: normal      Significant Labs:   Recent Lab Results       10/19/18  1652   10/19/18  1114   10/19/18  0745   10/19/18  0453   10/18/18  2113        Immature Granulocytes       1.0       Immature Grans (Abs)       0.06  Comment:  Mild elevation in immature granulocytes is non specific and   can be seen in a variety of conditions including  stress response,   acute inflammation, trauma and pregnancy. Correlation with other   laboratory and clinical findings is essential.         Anion Gap       6       Baso #       0.00       Basophil%       0.0       BUN, Bld       20       Calcium       8.1       Chloride       110       CO2       24       Creatinine       0.9       Differential Method       Automated       eGFR if        >60.0       eGFR if non        >60.0  Comment:  Calculation used to obtain the estimated glomerular filtration  rate (eGFR) is the CKD-EPI equation.          Eos #       0.0       Eosinophil%       0.0       Glucose       213       Gran # (ANC)       4.3       Gran%       70.5       Hematocrit       31.6       Hemoglobin       10.7       Lymph #       0.9       Lymph%       14.9       Magnesium       1.8       MCH       29.3       MCHC       33.9       MCV       87       Mono #       0.8       Mono%       13.6       MPV       9.9       nRBC       0       Phosphorus       2.3       Platelets       177       POCT Glucose 154 177 125   358     Potassium       3.5       RBC       3.65       RDW       14.9       Sodium       140       WBC       6.12                          All pertinent lab results from the past 24 hours have been reviewed.    Significant Imaging: I have reviewed all pertinent imaging results/findings within the past 24 hours.    Assessment and Plan:     CNS vasculitis    58 yr old with CNS vasculitis , diagnosed in June 2017, has received 12 rounds of Cytoxan last dose on 7/11. Has been on Imuran since the past 2-3 weeks. Per the wife, has had some personality changes with increased lethargy, confusion since switching over to Imuran. MRI brain done on 9/14 shows supratentorial and infratentorial white matter changes with superimposed infarcts unchanged from prior imaging on 4/2.    - On exam, he is A&O x3, recal is 1/3+0 with prompting, Luria intact, unable to do serial 7s or count the  months backwards. No motor/sensory deficits. DTRS 1+ in the lower extremities. Vibration and proprioception intact. No cerebellar signs.  - CT head shows patchy and confluent hypoattenuation in the deep cerebral and periventricular white matter in keeping with chronic microvascular ischemic change with superimposed remote lacunar type infarcts    Assessment and Recommendations:  - Received 2 doses of pulse steroids.   - S/p Cytoxan 750mg/m2  - Will receive further doses outpatient.         VTE Risk Mitigation (From admission, onward)        Ordered     heparin, porcine (PF) 100 unit/mL injection flush 500 Units  As needed (PRN)      10/17/18 1648     enoxaparin injection 40 mg  Daily      10/16/18 1800     Place sequential compression device  Until discontinued      10/15/18 1344     Place ELIZABETH hose  Until discontinued      10/15/18 1344     IP VTE HIGH RISK PATIENT  Once      10/15/18 1344        Will sign off. Please call with any questions.    Abe Kwok MD  Neurology  [facility]

## 2018-10-20 VITALS
HEIGHT: 69 IN | WEIGHT: 219.94 LBS | OXYGEN SATURATION: 95 % | BODY MASS INDEX: 32.57 KG/M2 | RESPIRATION RATE: 17 BRPM | DIASTOLIC BLOOD PRESSURE: 66 MMHG | TEMPERATURE: 98 F | SYSTOLIC BLOOD PRESSURE: 135 MMHG | HEART RATE: 62 BPM

## 2018-10-20 PROBLEM — R41.82 ALTERED MENTAL STATUS: Status: RESOLVED | Noted: 2018-04-01 | Resolved: 2018-10-20

## 2018-10-20 LAB
ANION GAP SERPL CALC-SCNC: 6 MMOL/L
BASOPHILS # BLD AUTO: 0 K/UL
BASOPHILS NFR BLD: 0 %
BUN SERPL-MCNC: 19 MG/DL
CALCIUM SERPL-MCNC: 8.6 MG/DL
CHLORIDE SERPL-SCNC: 107 MMOL/L
CO2 SERPL-SCNC: 26 MMOL/L
CREAT SERPL-MCNC: 0.9 MG/DL
DIFFERENTIAL METHOD: ABNORMAL
EOSINOPHIL # BLD AUTO: 0.1 K/UL
EOSINOPHIL NFR BLD: 1.6 %
ERYTHROCYTE [DISTWIDTH] IN BLOOD BY AUTOMATED COUNT: 14.9 %
EST. GFR  (AFRICAN AMERICAN): >60 ML/MIN/1.73 M^2
EST. GFR  (NON AFRICAN AMERICAN): >60 ML/MIN/1.73 M^2
GLUCOSE SERPL-MCNC: 155 MG/DL
GLUCOSE SERPL-MCNC: 72 MG/DL (ref 70–110)
HCT VFR BLD AUTO: 31.9 %
HGB BLD-MCNC: 11 G/DL
IMM GRANULOCYTES # BLD AUTO: 0.02 K/UL
IMM GRANULOCYTES NFR BLD AUTO: 0.5 %
LYMPHOCYTES # BLD AUTO: 0.9 K/UL
LYMPHOCYTES NFR BLD: 23.2 %
MAGNESIUM SERPL-MCNC: 1.8 MG/DL
MCH RBC QN AUTO: 29.5 PG
MCHC RBC AUTO-ENTMCNC: 34.5 G/DL
MCV RBC AUTO: 86 FL
MONOCYTES # BLD AUTO: 0.4 K/UL
MONOCYTES NFR BLD: 10.5 %
NEUTROPHILS # BLD AUTO: 2.4 K/UL
NEUTROPHILS NFR BLD: 64.2 %
NRBC BLD-RTO: 0 /100 WBC
PHOSPHATE SERPL-MCNC: 3.6 MG/DL
PLATELET # BLD AUTO: 166 K/UL
PMV BLD AUTO: 10 FL
POCT GLUCOSE: 154 MG/DL (ref 70–110)
POCT GLUCOSE: 68 MG/DL (ref 70–110)
POCT GLUCOSE: 97 MG/DL (ref 70–110)
POTASSIUM SERPL-SCNC: 3.6 MMOL/L
RBC # BLD AUTO: 3.73 M/UL
SODIUM SERPL-SCNC: 139 MMOL/L
WBC # BLD AUTO: 3.8 K/UL

## 2018-10-20 PROCEDURE — 99239 HOSP IP/OBS DSCHRG MGMT >30: CPT | Mod: ,,, | Performed by: HOSPITALIST

## 2018-10-20 PROCEDURE — 25000003 PHARM REV CODE 250: Performed by: HOSPITALIST

## 2018-10-20 PROCEDURE — 85025 COMPLETE CBC W/AUTO DIFF WBC: CPT

## 2018-10-20 PROCEDURE — 80048 BASIC METABOLIC PNL TOTAL CA: CPT

## 2018-10-20 PROCEDURE — 83735 ASSAY OF MAGNESIUM: CPT

## 2018-10-20 PROCEDURE — 36415 COLL VENOUS BLD VENIPUNCTURE: CPT

## 2018-10-20 PROCEDURE — 84100 ASSAY OF PHOSPHORUS: CPT

## 2018-10-20 RX ADMIN — INSULIN ASPART 2 UNITS: 100 INJECTION, SOLUTION INTRAVENOUS; SUBCUTANEOUS at 08:10

## 2018-10-20 RX ADMIN — INSULIN ASPART 15 UNITS: 100 INJECTION, SOLUTION INTRAVENOUS; SUBCUTANEOUS at 12:10

## 2018-10-20 RX ADMIN — DILTIAZEM HYDROCHLORIDE 240 MG: 240 CAPSULE, EXTENDED RELEASE ORAL at 08:10

## 2018-10-20 RX ADMIN — VITAMIN D, TAB 1000IU (100/BT) 5000 UNITS: 25 TAB at 08:10

## 2018-10-20 RX ADMIN — ATORVASTATIN CALCIUM 40 MG: 20 TABLET, FILM COATED ORAL at 08:10

## 2018-10-20 RX ADMIN — ATENOLOL 50 MG: 25 TABLET ORAL at 08:10

## 2018-10-20 RX ADMIN — INSULIN ASPART 15 UNITS: 100 INJECTION, SOLUTION INTRAVENOUS; SUBCUTANEOUS at 08:10

## 2018-10-20 RX ADMIN — ASPIRIN 81 MG: 81 TABLET, COATED ORAL at 08:10

## 2018-10-20 NOTE — PLAN OF CARE
HECTOR called Teri [phone] with [facility] to inform that patient is being discharged today and needs to be seen tomorrow.  Teri states that will be no problem.

## 2018-10-20 NOTE — DISCHARGE SUMMARY
Discharge Summary  Hospital Medicine    Attending Provider on Discharge: Jayesh Allen MD  Hospital Medicine Team: [facility]  Date of Admission:  10/15/2018     Date of Discharge:    Code status: Full Code    Active Hospital Problems    Diagnosis  POA    Acquired immunocompromised state [D84.9]  Yes    Cognitive deficits [R41.89]  Yes    Aphasia [R47.01]  Yes    Urinary incontinence [R32]  Yes    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]  Yes     Follows with Dr. Love  By mri.  Several active lesions, many old.  5/13: MRA brain/neck, MRI brain w/wo contrast show no vascular occlusion, multiple foci of hyperintensity in deep cerebral periventricular white matter in pattern of demyelinating process.   5/17: MRI spine performed, no spinal cord lesions.  Hospitalization 6/2017. EEG was performed negative for seizures.  Repeat MRI showing new lesion, question whether this was demyelination versus CVA.  Cytoxan 6/3/17 & 8/14/17      NAFLD (nonalcoholic fatty liver disease) [K76.0]  Yes     6/12/2015 liver biopsy showing advanced stage fibrosis with bridging fibrosis and scattered nodules.       JOHN with CPAP at night [G47.33]  Yes    Major depressive disorder with single episode, in full remission [F32.5]  Yes     Chronic    Mixed hyperlipidemia [E78.2]  Yes    Essential hypertension [I10]  Yes      Resolved Hospital Problems    Diagnosis Date Resolved POA    *Acute confusional state [F05] 10/19/2018 Yes     Intermittent confusion, disorientation.  Started 5/13 before steroids.        Acute metabolic encephalopathy [G93.41] 10/18/2018 Yes    Altered mental status [R41.82] 10/20/2018 Yes     HPI  Mr. Nunez is a 58 year old man with a hx of Primary CNS Vasculitis , IDDM2, Hx of CVA who presented to the ED following worsening confusion and a mechanical fall at home. Admitted to Hospital Medicine, and Neurology consulted due to concern for flare of known CNS Vasculitis. Coordinated inpatient then outpatient resumption of  Cyclophosphamide infusions, given previous clinical improvement on this regimen.    Hospital Course     Primary CNS Vasculitis:  Acute Encephalopathy, Improved  Mechanical Falls  -pt presented with recurrent falls, confusion, instability and other symptoms similar to his presentation of CNS vasculitis in the past. Symptoms started after he was switched from cyclophosphamide to azothioprine  -Initial CT head negative for acute abnormalities. MRI performed as part of stroke activation (10/17) negative for acute infarct or bleed. Appreciate Vascular Neurology assistance  - Received Cyclophosphamide evening of 10/18. Mental status much improved following infusion, approaching previous baseline per wife at bedside  - Metabolic workup: RPR: NR, UDS: negative, UA: no evidence of infection, TSH wnl's, Vitamin B12 wnl's, BG elevated but improving with increase Insulin dosing, no evidence of DKA.   - BCx's and UCx's (10/18) NGTD  - discontinued azathioprine at discharge, per Neurology recs     Insulin Dependent Diabetes Mellitus, Well Controlled  Long-Term Use of Insulin  Glucocorticoid-Induced Hyperglycemia  - Last HbA1c: 6.2 on 10/15/18  - BG's initially elevated in setting of high dose IV steroids, as steroid effect wore off - tapered to Insulin Levemir 15 units BID and Insulin Aspart 10 units AC. Discharged on home basal bolus regimen     Hypertension:  - continued atenolol, diltiazem, irbesartan     Dyslipidemia:  - continued atorvastatin     Hx of CVA:  - continued asa and atorvastatin    Recent Labs   Lab 10/18/18  0346 10/19/18  0453 10/20/18  0544   WBC 8.75 6.12 3.80*   HGB 10.4* 10.7* 11.0*   HCT 30.7* 31.6* 31.9*    177 166     Recent Labs   Lab 10/18/18  0346 10/19/18  0453 10/20/18  0544    140 139   K 4.4 3.5 3.6    110 107   CO2 25 24 26   BUN 27* 20 19   CREATININE 1.1 0.9 0.9   * 213* 155*   CALCIUM 8.9 8.1* 8.6*   MG 2.0 1.8 1.8   PHOS 2.9 2.3* 3.6     No results for input(s):  ALKPHOS, ALT, AST, ALBUMIN, PROT, BILITOT, INR in the last 168 hours.   Recent Labs   Lab 10/18/18  1658 10/18/18  2113 10/19/18  0745 10/19/18  1114 10/19/18  1652 10/19/18  2103   POCTGLUCOSE 280* 358* 125* 177* 154* 181*     No results for input(s): CPK, CPKMB, MB, TROPONINI in the last 72 hours.    Current Discharge Medication List      CONTINUE these medications which have NOT CHANGED    Details   alclomethasone (ACLOVATE) 0.05 % cream       aspirin (ECOTRIN) 81 MG EC tablet Take 81 mg by mouth once daily.      atenolol (TENORMIN) 50 MG tablet Take 1 tablet (50 mg total) by mouth once daily.  Qty: 90 tablet, Refills: 3    Associated Diagnoses: Essential hypertension      atorvastatin (LIPITOR) 40 MG tablet Take 1 tablet (40 mg total) by mouth once daily.  Qty: 90 tablet, Refills: 3    Associated Diagnoses: Mixed hyperlipidemia      blood sugar diagnostic (TRUE METRIX GLUCOSE TEST STRIP) Strp Test blood sugar four times a day  Qty: 100 each, Refills: 11      diltiaZEM (DILACOR XR) 240 MG CDCR Take 1 capsule (240 mg total) by mouth once daily.  Qty: 90 capsule, Refills: 3    Associated Diagnoses: Essential hypertension      insulin aspart U-100 (NOVOLOG) 100 unit/mL InPn pen Inject 20 Units into the skin 3 (three) times daily with meals.  Qty: 18 mL, Refills: 11    Associated Diagnoses: Type 2 diabetes mellitus with hyperglycemia, with long-term current use of insulin; Controlled type 2 diabetes mellitus with diabetic nephropathy, with long-term current use of insulin      insulin glargine (BASAGLAR KWIKPEN U-100 INSULIN) 100 unit/mL (3 mL) InPn pen Inject 40 Units into the skin 2 (two) times daily.  Qty: 2 Box, Refills: 11    Associated Diagnoses: Type 2 diabetes mellitus with hyperglycemia, with long-term current use of insulin; Controlled type 2 diabetes mellitus with diabetic nephropathy, with long-term current use of insulin      insulin syringe-needle U-100 (INSULIN SYRINGE) 1/2 mL 30 gauge x 5/16 Syrg Use  "3x/day  Qty: 300 each, Refills: 3      irbesartan (AVAPRO) 300 MG tablet Take 1 tablet (300 mg total) by mouth every evening.  Qty: 90 tablet, Refills: 3    Associated Diagnoses: Essential hypertension      ketoconazole (NIZORAL) 2 % cream       lancets 30 gauge Misc Test blood sugar four times a day  Qty: 100 each, Refills: 11      liraglutide 0.6 mg/0.1 mL, 18 mg/3 mL, subq PNIJ (VICTOZA 3-TOMASZ) 0.6 mg/0.1 mL (18 mg/3 mL) PnIj Inject 1.8 mg into the skin once daily.  Qty: 9 mL, Refills: 11    Associated Diagnoses: Type 2 diabetes mellitus with hyperglycemia, with long-term current use of insulin; Controlled type 2 diabetes mellitus with diabetic nephropathy, with long-term current use of insulin      metFORMIN (GLUCOPHAGE-XR) 500 MG 24 hr tablet Take 2 tablets (1,000 mg total) by mouth 2 (two) times daily with meals.  Qty: 360 tablet, Refills: 3    Associated Diagnoses: Type 2 diabetes mellitus with hyperglycemia, with long-term current use of insulin; Controlled type 2 diabetes mellitus with diabetic nephropathy, with long-term current use of insulin      NOVOFINE PLUS 32 gauge x 1/6" Ndle       omega-3 fatty acids-vitamin E (FISH OIL) 1,000 mg Cap Take 1 capsule by mouth 2 (two) times daily.  Refills: 0    Associated Diagnoses: Hypertriglyceridemia      !! pen needle, diabetic (BD ULTRA-FINE ANA PEN NEEDLE) 32 gauge x 5/32" Ndle Use one needle twice daily  Qty: 100 each, Refills: 3      !! pen needle, diabetic (BD ULTRA-FINE ANA PEN NEEDLE) 32 gauge x 5/32" Ndle 4 times daily insulin administration  Qty: 400 each, Refills: 5    Associated Diagnoses: Controlled type 2 diabetes mellitus with diabetic nephropathy, with long-term current use of insulin      venlafaxine (EFFEXOR-XR) 75 MG 24 hr capsule Take 2 capsules (150 mg total) by mouth once daily.  Qty: 180 capsule, Refills: 1    Comments: Please consider 90 day supplies to promote better adherence  Associated Diagnoses: Depression, unspecified depression type "      vitamin D 1000 units Tab Take 5 tablets (5,000 Units total) by mouth once daily.       !! - Potential duplicate medications found. Please discuss with provider.      STOP taking these medications       azaTHIOprine (IMURAN) 50 mg Tab Comments:   Reason for Stopping:         calcium carbonate (OS-DONNA) 500 mg calcium (1,250 mg) tablet Comments:   Reason for Stopping:         insulin syringe-needle U-100 0.5 mL 31 gauge x 5/16 Syrg Comments:   Reason for Stopping:         blood-glucose meter kit Comments:   Reason for Stopping:               Discharge Diet:cardiac diet and diabetic diet: 1800 calorie with Normal Fluid intake of 1500 - 2000 mL per day    Activity: activity as tolerated    Discharge Condition: Good    Disposition: Home-Health Care c     Time spent  on the discharge of the patient including review of hospital course with the patient. reviewing discharge medications and arranging follow-up care: >35 mins    Discharge examination Patient was seen and examined on the date of discharge and determined to be suitable for discharge.    Discharge plan and follow up:    Future Appointments   Date Time Provider Department Center   10/30/2018  1:40 PM Shana Love MD [facility]   11/23/2018  7:45 AM Umesh Christina OD [facility]   2/28/2019 11:30 AM INJECTION, INFECTIOUS DISEASES [facility] ID IN[facility] Allen MD  Hospital Medicine Staff  [facility]   Pager: [phone]

## 2018-10-20 NOTE — PROGRESS NOTES
Pt's wife called nursing station stating that pt was unarousable.  Pt woke up for approximately 5 seconds each time sternal rubbing was performed, but was unable to remain awake.  BG of 98 noted. SpO2 ranging from 88-94%. Pt had an episode of incontinence.  Change of MS from earlier this shift reported to Dr. Allen who re-evaluated pt.  Pt remained awake while condom catheter being replaced and linens being changed.  Pt now AAOx4. VSS. Will continue to monitor.     10/20/18 1426   Vital Signs   Temp 98.2 °F (36.8 °C)   Temp src Oral   Pulse (!) 59   Heart Rate Source Monitor   Resp 16   SpO2 (!) 94 %   O2 Device (Oxygen Therapy) room air   /63   MAP (mmHg) 87   BP Location Left arm   Patient Position Lying   Assessments (Pre/Post)   Level of Consciousness (AVPU) responds to pain

## 2018-10-20 NOTE — PLAN OF CARE
[facility]  HOME  HEALTH ORDERS     10/20/2018    Admit to Home Health    Diagnoses:  Active Hospital Problems    Diagnosis  POA    Acquired immunocompromised state [D84.9]  Yes    Cognitive deficits [R41.89]  Yes    Aphasia [R47.01]  Yes    Urinary incontinence [R32]  Yes    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]  Yes     Follows with Dr. Love  By mri.  Several active lesions, many old.  5/13: MRA brain/neck, MRI brain w/wo contrast show no vascular occlusion, multiple foci of hyperintensity in deep cerebral periventricular white matter in pattern of demyelinating process.   5/17: MRI spine performed, no spinal cord lesions.  Hospitalization 6/2017. EEG was performed negative for seizures.  Repeat MRI showing new lesion, question whether this was demyelination versus CVA.  Cytoxan 6/3/17 & 8/14/17      NAFLD (nonalcoholic fatty liver disease) [K76.0]  Yes     6/12/2015 liver biopsy showing advanced stage fibrosis with bridging fibrosis and scattered nodules.       JOHN with CPAP at night [G47.33]  Yes    Major depressive disorder with single episode, in full remission [F32.5]  Yes     Chronic    Mixed hyperlipidemia [E78.2]  Yes    Essential hypertension [I10]  Yes      Resolved Hospital Problems    Diagnosis Date Resolved POA    *Acute confusional state [F05] 10/19/2018 Yes     Intermittent confusion, disorientation.  Started 5/13 before steroids.        Acute metabolic encephalopathy [G93.41] 10/18/2018 Yes    Altered mental status [R41.82] 10/20/2018 Yes       Patient is homebound due to:  Acute confusional state    Allergies:Review of patient's allergies indicates:  No Known Allergies    Diet: Diabetic, Cardiac     Acitivities: As tolerated per home PT/OT    Nursing:   SN to complete comprehensive assessment including routine vital signs. Instruct on disease process and s/s of complications to report to MD. Review/verify medication list sent home with the patient at time of discharge  and  instruct patient/caregiver as needed. Frequency may be adjusted depending on start of care date.    Notify MD if SBP > 160 or < 90; DBP > 90 or < 50; HR > 120 or < 50; Temp > 101; Other: BG > 400    CONSULTS:   PT to evaluate and treat. Evaluate for home safety and equipment needs; Establish/upgrade home exercise program. Perform / instruct on therapeutic exercises, gait training, transfer training, and Range of Motion.    OT to evaluate and treat. Evaluate home environment for safety and equipment needs. Perform/Instruct on transfers, ADL training, ROM, and therapeutic exercises.    Medications: Review discharge medications with patient and family and provide education.       Bessy Nunez   Home Medication Instructions SVEN:[id number]    Printed on:10/20/18 8907   Medication Information                      alclomethasone (ACLOVATE) 0.05 % cream               aspirin (ECOTRIN) 81 MG EC tablet  Take 81 mg by mouth once daily.             atenolol (TENORMIN) 50 MG tablet  Take 1 tablet (50 mg total) by mouth once daily.             atorvastatin (LIPITOR) 40 MG tablet  Take 1 tablet (40 mg total) by mouth once daily.             blood sugar diagnostic (TRUE METRIX GLUCOSE TEST STRIP) Strp  Test blood sugar four times a day             diltiaZEM (DILACOR XR) 240 MG CDCR  Take 1 capsule (240 mg total) by mouth once daily.             insulin aspart U-100 (NOVOLOG) 100 unit/mL InPn pen  Inject 20 Units into the skin 3 (three) times daily with meals.             insulin glargine (BASAGLAR KWIKPEN U-100 INSULIN) 100 unit/mL (3 mL) InPn pen  Inject 40 Units into the skin 2 (two) times daily.             insulin syringe-needle U-100 (INSULIN SYRINGE) 1/2 mL 30 gauge x 5/16 Syrg  Use 3x/day             irbesartan (AVAPRO) 300 MG tablet  Take 1 tablet (300 mg total) by mouth every evening.             ketoconazole (NIZORAL) 2 % cream               lancets 30 gauge Misc  Test blood sugar four times a day            "  liraglutide 0.6 mg/0.1 mL, 18 mg/3 mL, subq PNIJ (VICTOZA 3-TOMASZ) 0.6 mg/0.1 mL (18 mg/3 mL) PnIj  Inject 1.8 mg into the skin once daily.             metFORMIN (GLUCOPHAGE-XR) 500 MG 24 hr tablet  Take 2 tablets (1,000 mg total) by mouth 2 (two) times daily with meals.             NOVOFINE PLUS 32 gauge x 1/6" Ndle               omega-3 fatty acids-vitamin E (FISH OIL) 1,000 mg Cap  Take 1 capsule by mouth 2 (two) times daily.             pen needle, diabetic (BD ULTRA-FINE ANA PEN NEEDLE) 32 gauge x 5/32" Ndle  Use one needle twice daily             pen needle, diabetic (BD ULTRA-FINE ANA PEN NEEDLE) 32 gauge x 5/32" Ndle  4 times daily insulin administration             venlafaxine (EFFEXOR-XR) 75 MG 24 hr capsule  Take 2 capsules (150 mg total) by mouth once daily.             vitamin D 1000 units Tab  Take 5 tablets (5,000 Units total) by mouth once daily.                       _________________________________  Jayesh Allen MD  10/20/2018      "

## 2018-10-22 ENCOUNTER — TELEPHONE (OUTPATIENT)
Dept: NEUROLOGY | Facility: CLINIC | Age: 58
End: 2018-10-22

## 2018-10-22 LAB
BACTERIA BLD CULT: NORMAL
BACTERIA BLD CULT: NORMAL

## 2018-10-23 ENCOUNTER — PATIENT MESSAGE (OUTPATIENT)
Dept: NEUROLOGY | Facility: CLINIC | Age: 58
End: 2018-10-23

## 2018-10-25 ENCOUNTER — TELEPHONE (OUTPATIENT)
Dept: OPHTHALMOLOGY | Facility: CLINIC | Age: 58
End: 2018-10-25

## 2018-10-25 NOTE — TELEPHONE ENCOUNTER
10/25/18  Josefina returned Mrs. Godfrey ( Pt wife) call and I have rescheduled pt appt for his Laser treatment. I have placed appt.in out-going mail for a reminder of his rescheduled appt. stj       ----- Message from Ivett VIJAY Ramos sent at 10/25/2018  3:08 PM CDT -----  Contact: PT Portal Request  Appointment Request From: Bessy Nunez    With Provider: Isael Mixon MD [[facility] - Ophthalmology]    Preferred Date Range: Any    Preferred Times: Any time    Reason for visit: Existing Patient    Comments:  This message is being sent by Bekah Nunez on behalf of Bessy Nunez.  Reschedule laser eye surgery due to diabetic damage behind the eyes.

## 2018-10-30 ENCOUNTER — OFFICE VISIT (OUTPATIENT)
Dept: NEUROLOGY | Facility: CLINIC | Age: 58
End: 2018-10-30
Payer: COMMERCIAL

## 2018-10-30 VITALS
BODY MASS INDEX: 31.35 KG/M2 | HEIGHT: 70 IN | DIASTOLIC BLOOD PRESSURE: 86 MMHG | WEIGHT: 219 LBS | SYSTOLIC BLOOD PRESSURE: 122 MMHG | HEART RATE: 94 BPM

## 2018-10-30 DIAGNOSIS — Z79.630 ENCOUNTER FOR LONG TERM CURRENT USE OF CYCLOPHOSPHAMIDE: ICD-10-CM

## 2018-10-30 DIAGNOSIS — D84.9 ACQUIRED IMMUNOCOMPROMISED STATE: ICD-10-CM

## 2018-10-30 DIAGNOSIS — Z74.09 IMPAIRED FUNCTIONAL MOBILITY, BALANCE, GAIT, AND ENDURANCE: ICD-10-CM

## 2018-10-30 DIAGNOSIS — I77.6 CNS VASCULITIS: Primary | ICD-10-CM

## 2018-10-30 DIAGNOSIS — R41.0 EPISODIC CONFUSION: ICD-10-CM

## 2018-10-30 PROCEDURE — 3079F DIAST BP 80-89 MM HG: CPT | Mod: CPTII,S$GLB,, | Performed by: PSYCHIATRY & NEUROLOGY

## 2018-10-30 PROCEDURE — 99999 PR PBB SHADOW E&M-EST. PATIENT-LVL IV: CPT | Mod: PBBFAC,,, | Performed by: PSYCHIATRY & NEUROLOGY

## 2018-10-30 PROCEDURE — 3008F BODY MASS INDEX DOCD: CPT | Mod: CPTII,S$GLB,, | Performed by: PSYCHIATRY & NEUROLOGY

## 2018-10-30 PROCEDURE — 99215 OFFICE O/P EST HI 40 MIN: CPT | Mod: S$GLB,,, | Performed by: PSYCHIATRY & NEUROLOGY

## 2018-10-30 PROCEDURE — 3074F SYST BP LT 130 MM HG: CPT | Mod: CPTII,S$GLB,, | Performed by: PSYCHIATRY & NEUROLOGY

## 2018-10-30 NOTE — ASSESSMENT & PLAN NOTE
Continue monthly Cytoxan 750mg/m2 every 28 days;   Appreciate partnership with Dr. Goldstein  Pt has seen ID for vaccines in patients on chronic immunosuppression

## 2018-10-30 NOTE — PROGRESS NOTES
Subjective:       Patient ID: Bessy Nunez is a 58 y.o. male who presents today for a routine clinic visit for CNS vasculitis. The history has been provided by the patient and his family. He is accompanied by his wife and son.    HPI:  · DMT:  received Cytoxan in hospital 2 weeks ago;  Imuran discontinued  · Side effects from DMT? Diarrhea after each dose    Two weeks ago, patient was rapidly declining with regard to cognitive dysfunction and gait.  He had a fall at home, and so he was brought into ER at [facility].  MRI in hospital was stable;  Neurology was consulted, and decision was made that CNS vasculitis was not responding to Imuran, and that we should restart Cytoxan. This was given in the hospital, and he was discharged home with home health.  His wife and son state that after two days, he experienced a significant improvement in gait and cognitive function, and he's now returned to his baseline.      In general in the past, pt would have relatively good cognitive function in the immediate 3 weeks following his Cytoxan infusion, and in the week prior to the next infusion, the symptoms would subtly return.     SOCIAL HISTORY  Social History     Tobacco Use    Smoking status: Never Smoker    Smokeless tobacco: Never Used   Substance Use Topics    Alcohol use: No     Alcohol/week: 0.0 oz    Drug use: No     Living arrangements - the patient lives with his wife  Employment: receives disability; will be eligible for Medicare in November;          Objective:      25 foot timed walk:  7.8s without assist; was 8.83 seconds today WITHOUT assistance last visit;     Neurologic Exam      MS: continues to have flat affect; does not initiate much conversation; oriented to date and location; knows we are on the 6th floor; verbal comprehension and fluency are normal  Extraocular movements are intact.   Facial movements are normal. He has normal strength in upper and lower extremities. Rapid sequential  movements are normal in the upper extremities.   Reflexes are 2+ and symmetric throughout.   Gait is steady (does not use a cane today).   Romberg is positive.   Finger to nose coordination is normal.   He is able to follow 3 step commands.     Diagnosis/Assessment/Plan:     Problem List Items Addressed This Visit        1 - High    CNS vasculitis    Overview     Failed Cytoxan hiatus and transition to Imuran Sept/Oct 2018;   Now much improved back on Cytoxan;          Current Assessment & Plan     Continue monthly Cytoxan 750mg/m2 every 28 days;   Appreciate partnership with Dr. Goldstein  Pt has seen ID for vaccines in patients on chronic immunosuppression            2     Acquired immunocompromised state    Overview     cyclophosphamide            3     Episodic confusion    Overview     Improved now that back on cyclophosphamide            4     Encounter for long term current use of cyclophosphamide       5     Impaired functional mobility, balance, gait, and endurance    Current Assessment & Plan     Continue home healt             F/u Beti Chaskin CNS in 2 mo    Over 50% of this 40 minute visit was spent in direct face to face counseling of the patient about MS, DMT considerations, and MS symptom management. The patient agrees with the plan of care. He has an appt with Dr. Love in late October.

## 2018-11-02 ENCOUNTER — TELEPHONE (OUTPATIENT)
Dept: ADMINISTRATIVE | Facility: CLINIC | Age: 58
End: 2018-11-02

## 2018-11-02 NOTE — PROGRESS NOTES
"This note was created by combination of typed  and Dragon dictation.  Transcription errors may be present.  If there are any questions, please contact me.    Assessment & Plan:   Controlled type 2 diabetes mellitus with diabetic nephropathy, with long-term current use of insulin  -A1cs have been very good though his blood sugars do sound like the run high.  I wonder if this is a falsely low A1c.  I have asked family to send me glucose logs every week.  Currently on Basaglar 40 b.i.d. and NovoLog 20 with meals.  -     pen needle, diabetic (BD ULTRA-FINE ANA PEN NEEDLE) 32 gauge x 5/32" Ndle; 4 times daily insulin administration  Dispense: 400 each; Refill: 5  -     blood sugar diagnostic (TRUE METRIX GLUCOSE TEST STRIP) Strp; Test blood sugar four times a day  Dispense: 400 each; Refill: 11    Major depressive disorder with single episode, in full remission  -family questions whether he is more irritable on the Effexor.  He is only on 75 mg.  Has room to increase if needed.  They think in retrospect maybe this will of did better.  I will rechallenge him on Zoloft 50 mg.  Overlap with Effexor 37.5 daily for 1 week and then stop the Effexor.  Previously on Zoloft 150 and may need to go up to higher dose.  -     sertraline (ZOLOFT) 50 MG tablet; Take 1 tablet (50 mg total) by mouth once daily. Weaning off effexor  Dispense: 30 tablet; Refill: 11  -     venlafaxine (EFFEXOR) 37.5 MG Tab; Take 1 tablet (37.5 mg total) by mouth once daily. Then stop the effexor stay on zoloft for 7 days  Dispense: 7 tablet; Refill: 0    Needs flu shot  -     Influenza - Quadrivalent (3 years & older) (PF)        Medications Discontinued During This Encounter   Medication Reason    venlafaxine (EFFEXOR-XR) 75 MG 24 hr capsule     pen needle, diabetic (BD ULTRA-FINE ANA PEN NEEDLE) 32 gauge x 5/32" Ndle Reorder    blood sugar diagnostic (TRUE METRIX GLUCOSE TEST STRIP) Strp Reorder       meds sent this encounter:  Medications " "Ordered This Encounter   Medications    blood sugar diagnostic (TRUE METRIX GLUCOSE TEST STRIP) Strp     Sig: Test blood sugar four times a day     Dispense:  400 each     Refill:  11    pen needle, diabetic (BD ULTRA-FINE ANA PEN NEEDLE) 32 gauge x /32" Ndle     Si times daily insulin administration     Dispense:  400 each     Refill:  5    sertraline (ZOLOFT) 50 MG tablet     Sig: Take 1 tablet (50 mg total) by mouth once daily. Weaning off effexor     Dispense:  30 tablet     Refill:  11    venlafaxine (EFFEXOR) 37.5 MG Tab     Sig: Take 1 tablet (37.5 mg total) by mouth once daily. Then stop the effexor stay on zoloft for 7 days     Dispense:  7 tablet     Refill:  0       Follow Up: No Follow-up on file.    Subjective:     Chief Complaint   Patient presents with    Hospital Follow Up       HPI  Bessy is a 58 y.o. male, last appointment with this clinic was 2018.    Diabetes, with peripheral neuropathy  Hypertension  2016 TTE:   1 - Mildly depressed left ventricular systolic function (EF 45-50%).  Mild global hypokinesis at rest. 2 - Eccentric hypertrophy. 3 - Left ventricular diastolic dysfunction.  Hyperlipidemia, atorvastatin  CHASE  2015 RUQ US: Hepatomegaly and fatty infiltration of the liver. Stable small hypoechoic lesion in the left hepatic lobe. Splenomegaly, stable. Cholelithiasis  2015 liver biopsy showing advanced stage fibrosis with bridging fibrosis and scattered nodules.  2014 colonoscopy showing mild nonspecific acute and chronic inflammation of the ascending colon.  Hyperplastic polyp. Repeat 3 years.  2017 EGD showing duodenal and gastric ulcer which were treated with cautery.  Depression, Zoloft, 2018 changed to effexor. Zoloft SE of hallucinations.  JOHN not on CPAP - was not using b/c of confusion.  Incidental right renal lesion found on MRI of the spine.  Underwent renal ultrasound which showed stable bilateral simple and minimally, complicated renal " cysts.  Incidental right nonobstructive nephrolithiasis.   CNS vasculitis.  hx of steroid treatment, changed to cytoxan. 10/2018 trial of imuran did not do well.    2/2018 lipid good  10/2018 a1c 6.2.  TSH WNL    Here accompanied by wife and son.  Most of history from wife and son and some from the patient.    Hospitalization for worsening function off of Cytoxan with trial of Imdur and.  Originally the Cytoxan seem to have side effects.  He was reverted back to Cytoxan.    On cytoxan - the first week - hyperglycemia 200 to 300; hypoglycemia the last day of hospitalization.  Family notes that the 1st week after Cytoxan infusion his blood sugars tend to go up in the 200-300 range.  They do use a conservative sliding scale, if his blood sugars above 150 they will give him 22 units with a meal; for sugars greater than 200 they will use 24 units.  So 2 units every 50 above 150. basaglar the same 40 BID.  A1c has been good, suspicious a good given what they are reporting.  Blood loss?  I have asked them to send me blood sugars every week and I can make adjustments if needed.    He did have an episode of hypoglycemia down to 70 when he was staying with his son last week.    Not seeing endo NP at this time.     Wife and son are wondering if they should switch him back to Zoloft.  They think maybe with the Effexor he has been a bit more irritable, still with low energy.  I believe previously had been on Zoloft as high as 150 mg.  Had still experience low energy and that was why he was changed to Effexor in the 1st place.  His previous doctor had tried him with Abilify but that was never proved so he never took it.  Wife is also concerned because she had read where it can cause retinal hemorrhages.  She reports she brought this up with the ophthalmologist told her that certainly the confounder is longstanding diabetes.      Patient Care Team:  Edgar Nova MD as PCP - General (Internal Medicine)  Promise Steele NP as Nurse  Practitioner (Endocrinology)  Shana Love MD as Consulting Physician (Neurology)    Patient Active Problem List    Diagnosis Date Noted    Encounter for long term current use of cyclophosphamide 10/30/2018    Need for Tdap vaccination 08/29/2018    Need for hepatitis A and B vaccination 08/29/2018    Need for pneumococcal vaccination 08/29/2018    Acquired immunocompromised state 08/29/2018     cyclophosphamide      Controlled type 2 diabetes mellitus with diabetic nephropathy, with long-term current use of insulin 08/01/2018    Chemotherapy induced nausea and vomiting 06/12/2018    Dysphasia     Aphasia 12/04/2017    Dysphonia 12/04/2017    Cognitive deficits 12/04/2017    Closed compression fracture of L4 lumbar vertebra 11/15/2017    Adverse effect of glucocorticoids and synthetic analogues, initial encounter 09/15/2017    Lacunar stroke of left subthalamic region 09/14/2017 9/14/2017 MRI brain: 1. Findings compatible with a small acute lacunar infarct adjacent to the left frontal horn.  Nonspecific enhancement just inferior to this region and extending into the left basal ganglia, as well as within the inferior aspect of the left cerebellum.  No evidence of a focal mass.  2.  Extensive increased signal intensity involving the periventricular white matter compatible with demyelinating disease versus vasculitis.  3.  Clinical correlation and followup recommended.  4.  This reportedly flattened in the EPIC and the [place] callos[place] medical record system.      Episodic confusion      Improved now that back on cyclophosphamide      Cytopenia 08/30/2017    Impaired functional mobility, balance, gait, and endurance 06/14/2017    Urinary incontinence 06/04/2017    CNS vasculitis 06/02/2017     Failed Cytoxan hiatus and transition to Imuran Sept/Oct 2018;   Now much improved back on Cytoxan;       Encephalopathy 05/26/2017    Type 2 diabetes mellitus with diabetic polyneuropathy, with  long-term current use of insulin 05/14/2017    Amblyopia 05/13/2017    Hx of colonic polyp 01/30/2015 2/21/2014 colonoscopy showing mild nonspecific acute and chronic inflammation of the ascending colon.  Hyperplastic polyp. Repeat 3 years.      Lipodystrophy 10/11/2013    NAFLD (nonalcoholic fatty liver disease) 10/11/2013     6/12/2015 liver biopsy showing advanced stage fibrosis with bridging fibrosis and scattered nodules.      Obesity (BMI 30-39.9) 10/11/2013    ED (erectile dysfunction) 10/11/2013     JOHN with CPAP at night 10/11/2013    Major depressive disorder with single episode, in full remission 11/09/2012    Mixed hyperlipidemia 11/09/2012    Essential hypertension 11/09/2012       PAST MEDICAL HISTORY:  Past Medical History:   Diagnosis Date    Amblyopia     Cataract     CNS vasculitis 6/2/2017    Follows with Dr. Love By mri.  Several active lesions, many old. 5/13: MRA brain/neck, MRI brain w/wo contrast show no vascular occlusion, multiple foci of hyperintensity in deep cerebral periventricular white matter in pattern of demyelinating process.  5/17: MRI spine performed, no spinal cord lesions. Hospitalization 6/2017. EEG was performed negative for seizures.  Repeat MRI showing new lesion, question whether this was demyelination versus CVA. 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 Findings compatible with a small acute lacunar infarct adjacent to the left frontal horn.  Nonspecific enhancement just inferior to this region and extending into the left basal ganglia, as well as within the inferior aspect of the left cerebellum.  No evidence of a focal mass. 2.  Extensive increased signal intensity involving the periventricular white matter compatible with demyelinating  disease versus vasculitis. 3.  Clinical correlation and followup recommended. 4.  This reportedly flattened in the EPIC and the corpus callosum medical record system.    Mixed hyperlipidemia 11/9/2012    NAFLD (nonalcoholic fatty liver disease) 10/11/2013    CHASE (nonalcoholic steatohepatitis)     Obesity     Stroke     Type 2 diabetes mellitus with diabetic polyneuropathy, with long-term current use of insulin 5/14/2017    Type 2 diabetes mellitus with hyperglycemia, with long-term current use of insulin 10/11/2013       PAST SURGICAL HISTORY:  Past Surgical History:   Procedure Laterality Date    BIOPSY LIVER AND ULTRASOUND N/A 6/9/2015    Performed by [facility] at [facility] OR 2ND FLR    COLONOSCOPY N/A 2/21/2014    [facility]    ESOPHAGOGASTRODUODENOSCOPY (EGD) N/A 5/29/2017    Performed by Hai Carter MD at [facility] ENDO (2ND FLR)    SKIN CANCER EXCISION         SOCIAL HISTORY:  Social History     Socioeconomic History    Marital status:      Spouse name: Not on file    Number of children: Not on file    Years of education: Not on file    Highest education level: Not on file   Social Needs    Financial resource strain: Not on file    Food insecurity - worry: Not on file    Food insecurity - inability: Not on file    Transportation needs - medical: Not on file    Transportation needs - non-medical: Not on file   Occupational History    Occupation: unemployed   Tobacco Use    Smoking status: Never Smoker    Smokeless tobacco: Never Used   Substance and Sexual Activity    Alcohol use: No     Alcohol/week: 0.0 oz    Drug use: No    Sexual activity: Not on file   Other Topics Concern    Not on file   Social History Narrative    Not on file       ALLERGIES AND MEDICATIONS: updated and reviewed.  Review of patient's allergies indicates:  No Known Allergies  Current Outpatient Medications   Medication Sig Dispense Refill    alclomethasone  "(ACLOVATE) 0.05 % cream       aspirin (ECOTRIN) 81 MG EC tablet Take 81 mg by mouth once daily.      atenolol (TENORMIN) 50 MG tablet Take 1 tablet (50 mg total) by mouth once daily. 90 tablet 3    atorvastatin (LIPITOR) 40 MG tablet Take 1 tablet (40 mg total) by mouth once daily. 90 tablet 3    azaTHIOprine (IMURAN) 50 mg Tab       blood sugar diagnostic (TRUE METRIX GLUCOSE TEST STRIP) Strp Test blood sugar four times a day 100 each 11    diltiaZEM (DILACOR XR) 240 MG CDCR Take 1 capsule (240 mg total) by mouth once daily. 90 capsule 3    insulin aspart U-100 (NOVOLOG) 100 unit/mL InPn pen Inject 20 Units into the skin 3 (three) times daily with meals. 18 mL 11    insulin glargine (BASAGLAR KWIKPEN U-100 INSULIN) 100 unit/mL (3 mL) InPn pen Inject 40 Units into the skin 2 (two) times daily. 2 Box 11    insulin syringe-needle U-100 (INSULIN SYRINGE) 1/2 mL 30 gauge x 5/16 Syrg Use 3x/day 300 each 3    irbesartan (AVAPRO) 300 MG tablet Take 1 tablet (300 mg total) by mouth every evening. 90 tablet 3    ketoconazole (NIZORAL) 2 % cream       lancets 30 gauge Misc Test blood sugar four times a day 100 each 11    liraglutide 0.6 mg/0.1 mL, 18 mg/3 mL, subq PNIJ (VICTOZA 3-TOMASZ) 0.6 mg/0.1 mL (18 mg/3 mL) PnIj Inject 1.8 mg into the skin once daily. 9 mL 11    metFORMIN (GLUCOPHAGE-XR) 500 MG 24 hr tablet Take 2 tablets (1,000 mg total) by mouth 2 (two) times daily with meals. 360 tablet 3    NOVOFINE PLUS 32 gauge x 1/6" Ndle       omega-3 fatty acids-vitamin E (FISH OIL) 1,000 mg Cap Take 1 capsule by mouth 2 (two) times daily.  0    pen needle, diabetic (BD ULTRA-FINE ANA PEN NEEDLE) 32 gauge x 5/32" Ndle Use one needle twice daily 100 each 3    pen needle, diabetic (BD ULTRA-FINE ANA PEN NEEDLE) 32 gauge x 5/32" Ndle 4 times daily insulin administration 400 each 5    venlafaxine (EFFEXOR-XR) 75 MG 24 hr capsule Take 2 capsules (150 mg total) by mouth once daily. 180 capsule 1    vitamin D 1000 " "units Tab Take 5 tablets (5,000 Units total) by mouth once daily.       No current facility-administered medications for this visit.        Review of Systems   Constitutional: Negative for chills and fever.   Respiratory: Negative for shortness of breath.    Cardiovascular: Negative for chest pain and palpitations.       Objective:   Physical Exam   Vitals:    11/05/18 1033   BP: 130/88   Pulse: 88   Temp: 98 °F (36.7 °C)   SpO2: 97%   Weight: 98.9 kg (217 lb 14.8 oz)   Height: 5' 10" (1.778 m)    Body mass index is 31.27 kg/m².  Weight: 98.9 kg (217 lb 14.8 oz)   Height: 5' 10" (177.8 cm)     Physical Exam   Constitutional: He appears well-developed and well-nourished. No distress.   Eyes: EOM are normal.   Cardiovascular: Normal rate, regular rhythm and normal heart sounds.   No murmur heard.  Pulmonary/Chest: Effort normal and breath sounds normal.   Musculoskeletal: Normal range of motion. He exhibits no edema.   Neurological: He is alert. No cranial nerve deficit. Coordination normal.    5/5 bilaterally.   Skin: Skin is warm and dry.   Psychiatric: He has a normal mood and affect. His behavior is normal. Thought content normal.     "

## 2018-11-05 ENCOUNTER — OFFICE VISIT (OUTPATIENT)
Dept: OPHTHALMOLOGY | Facility: CLINIC | Age: 58
End: 2018-11-05
Payer: COMMERCIAL

## 2018-11-05 ENCOUNTER — OFFICE VISIT (OUTPATIENT)
Dept: FAMILY MEDICINE | Facility: CLINIC | Age: 58
End: 2018-11-05
Payer: COMMERCIAL

## 2018-11-05 ENCOUNTER — OFFICE VISIT (OUTPATIENT)
Dept: PSYCHIATRY | Facility: CLINIC | Age: 58
End: 2018-11-05
Payer: COMMERCIAL

## 2018-11-05 VITALS — DIASTOLIC BLOOD PRESSURE: 85 MMHG | SYSTOLIC BLOOD PRESSURE: 137 MMHG | HEART RATE: 82 BPM

## 2018-11-05 VITALS
HEART RATE: 88 BPM | SYSTOLIC BLOOD PRESSURE: 130 MMHG | DIASTOLIC BLOOD PRESSURE: 88 MMHG | BODY MASS INDEX: 31.2 KG/M2 | OXYGEN SATURATION: 97 % | TEMPERATURE: 98 F | WEIGHT: 217.94 LBS | HEIGHT: 70 IN

## 2018-11-05 DIAGNOSIS — F32.5 MAJOR DEPRESSIVE DISORDER WITH SINGLE EPISODE, IN FULL REMISSION: Chronic | ICD-10-CM

## 2018-11-05 DIAGNOSIS — E11.3493 TYPE 2 DIABETES MELLITUS WITH BOTH EYES AFFECTED BY SEVERE NONPROLIFERATIVE RETINOPATHY WITHOUT MACULAR EDEMA, WITH LONG-TERM CURRENT USE OF INSULIN: Primary | ICD-10-CM

## 2018-11-05 DIAGNOSIS — Z63.8 FAMILY CONFLICT: ICD-10-CM

## 2018-11-05 DIAGNOSIS — E11.21 CONTROLLED TYPE 2 DIABETES MELLITUS WITH DIABETIC NEPHROPATHY, WITH LONG-TERM CURRENT USE OF INSULIN: Primary | ICD-10-CM

## 2018-11-05 DIAGNOSIS — Z79.4 CONTROLLED TYPE 2 DIABETES MELLITUS WITH DIABETIC NEPHROPATHY, WITH LONG-TERM CURRENT USE OF INSULIN: Primary | ICD-10-CM

## 2018-11-05 DIAGNOSIS — Z23 NEEDS FLU SHOT: ICD-10-CM

## 2018-11-05 DIAGNOSIS — F32.A DEPRESSIVE DISORDER: ICD-10-CM

## 2018-11-05 DIAGNOSIS — F02.818 MAJOR NEUROCOGNITIVE DISORDER DUE TO ANOTHER MEDICAL CONDITION WITH BEHAVIORAL DISTURBANCE: ICD-10-CM

## 2018-11-05 DIAGNOSIS — Z79.4 TYPE 2 DIABETES MELLITUS WITH BOTH EYES AFFECTED BY SEVERE NONPROLIFERATIVE RETINOPATHY WITHOUT MACULAR EDEMA, WITH LONG-TERM CURRENT USE OF INSULIN: Primary | ICD-10-CM

## 2018-11-05 PROCEDURE — 67228 TREATMENT X10SV RETINOPATHY: CPT | Mod: LT,S$GLB,, | Performed by: OPHTHALMOLOGY

## 2018-11-05 PROCEDURE — 3044F HG A1C LEVEL LT 7.0%: CPT | Mod: CPTII,S$GLB,, | Performed by: INTERNAL MEDICINE

## 2018-11-05 PROCEDURE — 99999 PR PBB SHADOW E&M-EST. PATIENT-LVL III: CPT | Mod: PBBFAC,,, | Performed by: OPHTHALMOLOGY

## 2018-11-05 PROCEDURE — 99214 OFFICE O/P EST MOD 30 MIN: CPT | Mod: 25,S$GLB,, | Performed by: INTERNAL MEDICINE

## 2018-11-05 PROCEDURE — 3079F DIAST BP 80-89 MM HG: CPT | Mod: CPTII,S$GLB,, | Performed by: INTERNAL MEDICINE

## 2018-11-05 PROCEDURE — 99999 PR PBB SHADOW E&M-EST. PATIENT-LVL III: CPT | Mod: PBBFAC,,, | Performed by: INTERNAL MEDICINE

## 2018-11-05 PROCEDURE — 99499 UNLISTED E&M SERVICE: CPT | Mod: S$GLB,,, | Performed by: OPHTHALMOLOGY

## 2018-11-05 PROCEDURE — 3075F SYST BP GE 130 - 139MM HG: CPT | Mod: CPTII,S$GLB,, | Performed by: INTERNAL MEDICINE

## 2018-11-05 PROCEDURE — 90847 FAMILY PSYTX W/PT 50 MIN: CPT | Mod: S$GLB,,, | Performed by: SOCIAL WORKER

## 2018-11-05 PROCEDURE — 90471 IMMUNIZATION ADMIN: CPT | Mod: S$GLB,,, | Performed by: INTERNAL MEDICINE

## 2018-11-05 PROCEDURE — 90686 IIV4 VACC NO PRSV 0.5 ML IM: CPT | Mod: S$GLB,,, | Performed by: INTERNAL MEDICINE

## 2018-11-05 PROCEDURE — 3008F BODY MASS INDEX DOCD: CPT | Mod: CPTII,S$GLB,, | Performed by: INTERNAL MEDICINE

## 2018-11-05 RX ORDER — PEN NEEDLE, DIABETIC 30 GX3/16"
NEEDLE, DISPOSABLE MISCELLANEOUS
Qty: 400 EACH | Refills: 5 | Status: SHIPPED | OUTPATIENT
Start: 2018-11-05 | End: 2019-04-22

## 2018-11-05 RX ORDER — AZATHIOPRINE 50 MG/1
TABLET ORAL
COMMUNITY
Start: 2018-10-21 | End: 2018-12-14

## 2018-11-05 RX ORDER — SERTRALINE HYDROCHLORIDE 50 MG/1
50 TABLET, FILM COATED ORAL DAILY
Qty: 30 TABLET | Refills: 11 | Status: SHIPPED | OUTPATIENT
Start: 2018-11-05 | End: 2018-12-14 | Stop reason: SDUPTHER

## 2018-11-05 RX ORDER — VENLAFAXINE 37.5 MG/1
37.5 TABLET ORAL DAILY
Qty: 7 TABLET | Refills: 0 | Status: ON HOLD | OUTPATIENT
Start: 2018-11-05 | End: 2018-12-07

## 2018-11-05 SDOH — SOCIAL DETERMINANTS OF HEALTH (SDOH): OTHER SPECIFIED PROBLEMS RELATED TO PRIMARY SUPPORT GROUP: Z63.8

## 2018-11-05 NOTE — PROGRESS NOTES
Family Psychotherapy (PhD/LCSW)    11/5/2018    Site: [facility]    Length of service: 50    Therapeutic intervention: 90847-Family therapy with patient; needed because Bessy has neurocognitive disorder and needs support of family to be able to implement any changes/recommendations    Persons present: Bessy, wife Bekah, son Marky    Interval history:  Bessy arrived to session casually dressed and with adequate hygiene, although his facial hair has continues to be overgrown and unkempt.  Pt was recently hospitalized for increased confusion and wife states there was concern that he was having another stroke.  He was transitioned back to Cytoxan and Imuran was discontinued.  Wife and son believe pt has improved since this change.  Pt's wife is also active in her own psychiatric treatment, and she presented with brighter affect and was more engaged in the family session, today.  The family continues to struggle with pt's apathy and irritability and continue to vacillate between accepting this as his new baseline and blaming him for his actions.  We focused on one example that wife raised in which she states Bessy becomes angry and irritable when he knows home health providers are coming to the home.  He threatens to not answer the door or yell at them when they arrive.  SW asked the family how many times Bessy has actually followed through on these threats and they reported, never.  In fact, Bessy is cooperative and pleasant with the  staff and then seems to forget that he was ever upset with Bekah.  Therefore, SW encouraged family to ignore these threats, distract Bessy with other issues or tasks, or change the subject.  Family has not followed through with locating a private aide for Bessy to give Bekah some respite because of the aforementioned concerns, but SW encouraged them to follow through.  Wife has asked son and family to assist with respite, though, and she was even able to attend the [employer] game  this past weekend.      Target symptoms: recurrent depression, adjustment     Patient's interpersonal/verbal exchanges: 28024 -Family therapy with patient.  Bessy's insight and judgement are limited.  Family continues to struggle to accept the permanence of Bsesy's impairments and often express anger and frustration toward Bessy for his apathy and non-compliance with directions.    Progress toward goals: minimal progress    Diagnosis:  F32.9 Unspecified Depressive Disorder    F02.81 Major neurocognitive disorder due to another medical condition with behavioral disturbance    Z63.8 Family Conflict     Plan: family psychotherapy       Return to clinic: 1 week

## 2018-11-06 NOTE — PROGRESS NOTES
HPI     DLS 09/10/18  59 Y/O M here today for Laser PRP OS today. stj     No change vision OU.  No pain/f/f     this morning 11/05/18    POhx   DM W NP RETINOPATHY OU   CATARACT OU     Last edited by Isael Mixon MD on 11/6/2018  5:05 PM. (History)            Assessment /Plan     For exam results, see Encounter Report.    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  The patient voiced understanding and wished to proceed with the procedure.  See separate consent form.    Patient identified.  Timeout performed.    Laser Procedure Note  Dx: Severe NPDR OS with ischemia  Laser Procedure: PRP OS  Anesthetic: Topical Proparacaine  Argon Yellow  Spot size: 200 microns  Power: 100-180 mW  Dur: 100 ms  # of spots:  [phone]  Location: inf periphery  Complications: None  F/U as above, sooner PRN any changes

## 2018-11-07 ENCOUNTER — TELEPHONE (OUTPATIENT)
Dept: FAMILY MEDICINE | Facility: CLINIC | Age: 58
End: 2018-11-07

## 2018-11-07 ENCOUNTER — TELEPHONE (OUTPATIENT)
Dept: PSYCHIATRY | Facility: CLINIC | Age: 58
End: 2018-11-07

## 2018-11-07 NOTE — TELEPHONE ENCOUNTER
SPOKE WITH PATIENT WIFE TO GIVE DIRECTIONS, EFFEXOR, ONE BY MOUTH ONCE DAILY, THEN STOP EFFEXOR, STAY ON ZOLOFT FOR 7 DAYS, VERBALIZED UNDERSTANDING

## 2018-11-07 NOTE — TELEPHONE ENCOUNTER
During session with pt and family, earlier this week, pt's wife Bekah mentioned that her psychiatrist wanted to talk to this SW.  Pt and wife, both, signed release of information.  They were agreeable to SW sharing pt's medical history and the progress of our psychotherapy appointments.  Faxed release to Onel Smith MD of [facility], [phone].  Awaiting follow up.

## 2018-11-07 NOTE — TELEPHONE ENCOUNTER
----- Message from Jacqui Carrera sent at 11/7/2018  2:04 PM CST -----  Contact: Bekah-Wife  Wife called to discuss medication that  is slowly taking patient off of and putting him on another. Please call to discuss [phone]

## 2018-11-13 RX ORDER — ONDANSETRON HCL IN 0.9 % NACL 8 MG/50 ML
8 INTRAVENOUS SOLUTION, PIGGYBACK (ML) INTRAVENOUS
Status: CANCELLED | OUTPATIENT
Start: 2018-11-13

## 2018-11-13 RX ORDER — SODIUM CHLORIDE 0.9 % (FLUSH) 0.9 %
10 SYRINGE (ML) INJECTION
Status: CANCELLED | OUTPATIENT
Start: 2018-11-13

## 2018-11-13 RX ORDER — HEPARIN 100 UNIT/ML
500 SYRINGE INTRAVENOUS
Status: CANCELLED | OUTPATIENT
Start: 2018-11-13

## 2018-11-15 ENCOUNTER — INFUSION (OUTPATIENT)
Dept: INFUSION THERAPY | Facility: HOSPITAL | Age: 58
End: 2018-11-15
Attending: INTERNAL MEDICINE
Payer: COMMERCIAL

## 2018-11-15 ENCOUNTER — OFFICE VISIT (OUTPATIENT)
Dept: HEMATOLOGY/ONCOLOGY | Facility: CLINIC | Age: 58
End: 2018-11-15
Payer: COMMERCIAL

## 2018-11-15 ENCOUNTER — LAB VISIT (OUTPATIENT)
Dept: LAB | Facility: HOSPITAL | Age: 58
End: 2018-11-15
Payer: COMMERCIAL

## 2018-11-15 VITALS
WEIGHT: 218.5 LBS | HEART RATE: 69 BPM | BODY MASS INDEX: 31.28 KG/M2 | DIASTOLIC BLOOD PRESSURE: 79 MMHG | SYSTOLIC BLOOD PRESSURE: 127 MMHG | RESPIRATION RATE: 17 BRPM | HEIGHT: 70 IN | TEMPERATURE: 99 F | OXYGEN SATURATION: 99 %

## 2018-11-15 VITALS
HEART RATE: 65 BPM | TEMPERATURE: 98 F | SYSTOLIC BLOOD PRESSURE: 130 MMHG | RESPIRATION RATE: 20 BRPM | DIASTOLIC BLOOD PRESSURE: 71 MMHG

## 2018-11-15 DIAGNOSIS — I77.6 CNS VASCULITIS: Primary | ICD-10-CM

## 2018-11-15 DIAGNOSIS — T45.1X5A CHEMOTHERAPY-INDUCED NEUTROPENIA: ICD-10-CM

## 2018-11-15 DIAGNOSIS — Z51.11 ENCOUNTER FOR CHEMOTHERAPY MANAGEMENT: Primary | ICD-10-CM

## 2018-11-15 DIAGNOSIS — D70.1 CHEMOTHERAPY-INDUCED NEUTROPENIA: ICD-10-CM

## 2018-11-15 DIAGNOSIS — I77.6 CNS VASCULITIS: ICD-10-CM

## 2018-11-15 LAB
ABO + RH BLD: NORMAL
ALBUMIN SERPL BCP-MCNC: 3.7 G/DL
ALP SERPL-CCNC: 76 U/L
ALT SERPL W/O P-5'-P-CCNC: 20 U/L
ANION GAP SERPL CALC-SCNC: 9 MMOL/L
AST SERPL-CCNC: 27 U/L
BASOPHILS # BLD AUTO: 0.05 K/UL
BASOPHILS NFR BLD: 0.7 %
BILIRUB SERPL-MCNC: 0.7 MG/DL
BLD GP AB SCN CELLS X3 SERPL QL: NORMAL
BUN SERPL-MCNC: 12 MG/DL
CALCIUM SERPL-MCNC: 9.7 MG/DL
CHLORIDE SERPL-SCNC: 108 MMOL/L
CO2 SERPL-SCNC: 27 MMOL/L
CREAT SERPL-MCNC: 1 MG/DL
DIFFERENTIAL METHOD: ABNORMAL
EOSINOPHIL # BLD AUTO: 0.2 K/UL
EOSINOPHIL NFR BLD: 2 %
ERYTHROCYTE [DISTWIDTH] IN BLOOD BY AUTOMATED COUNT: 14.7 %
EST. GFR  (AFRICAN AMERICAN): >60 ML/MIN/1.73 M^2
EST. GFR  (NON AFRICAN AMERICAN): >60 ML/MIN/1.73 M^2
GLUCOSE SERPL-MCNC: 139 MG/DL
HCT VFR BLD AUTO: 33.6 %
HGB BLD-MCNC: 11.2 G/DL
IMM GRANULOCYTES # BLD AUTO: 0.06 K/UL
IMM GRANULOCYTES NFR BLD AUTO: 0.8 %
LYMPHOCYTES # BLD AUTO: 0.9 K/UL
LYMPHOCYTES NFR BLD: 12.1 %
MCH RBC QN AUTO: 28.4 PG
MCHC RBC AUTO-ENTMCNC: 33.3 G/DL
MCV RBC AUTO: 85 FL
MONOCYTES # BLD AUTO: 0.7 K/UL
MONOCYTES NFR BLD: 8.9 %
NEUTROPHILS # BLD AUTO: 5.5 K/UL
NEUTROPHILS NFR BLD: 75.5 %
NRBC BLD-RTO: 0 /100 WBC
PLATELET # BLD AUTO: 156 K/UL
PMV BLD AUTO: 9.6 FL
POTASSIUM SERPL-SCNC: 4.7 MMOL/L
PROT SERPL-MCNC: 7 G/DL
RBC # BLD AUTO: 3.95 M/UL
SODIUM SERPL-SCNC: 144 MMOL/L
WBC # BLD AUTO: 7.34 K/UL

## 2018-11-15 PROCEDURE — 85025 COMPLETE CBC W/AUTO DIFF WBC: CPT

## 2018-11-15 PROCEDURE — 99999 PR PBB SHADOW E&M-EST. PATIENT-LVL V: CPT | Mod: PBBFAC,,, | Performed by: INTERNAL MEDICINE

## 2018-11-15 PROCEDURE — 80053 COMPREHEN METABOLIC PANEL: CPT

## 2018-11-15 PROCEDURE — 63600175 PHARM REV CODE 636 W HCPCS: Performed by: INTERNAL MEDICINE

## 2018-11-15 PROCEDURE — 96411 CHEMO IV PUSH ADDL DRUG: CPT

## 2018-11-15 PROCEDURE — 99214 OFFICE O/P EST MOD 30 MIN: CPT | Mod: S$GLB,,, | Performed by: INTERNAL MEDICINE

## 2018-11-15 PROCEDURE — 3074F SYST BP LT 130 MM HG: CPT | Mod: CPTII,S$GLB,, | Performed by: INTERNAL MEDICINE

## 2018-11-15 PROCEDURE — 86901 BLOOD TYPING SEROLOGIC RH(D): CPT

## 2018-11-15 PROCEDURE — 3078F DIAST BP <80 MM HG: CPT | Mod: CPTII,S$GLB,, | Performed by: INTERNAL MEDICINE

## 2018-11-15 PROCEDURE — 3008F BODY MASS INDEX DOCD: CPT | Mod: CPTII,S$GLB,, | Performed by: INTERNAL MEDICINE

## 2018-11-15 PROCEDURE — 96367 TX/PROPH/DG ADDL SEQ IV INF: CPT

## 2018-11-15 PROCEDURE — 96413 CHEMO IV INFUSION 1 HR: CPT

## 2018-11-15 PROCEDURE — 25000003 PHARM REV CODE 250: Performed by: INTERNAL MEDICINE

## 2018-11-15 RX ORDER — ONDANSETRON HCL IN 0.9 % NACL 8 MG/50 ML
8 INTRAVENOUS SOLUTION, PIGGYBACK (ML) INTRAVENOUS
Status: COMPLETED | OUTPATIENT
Start: 2018-11-15 | End: 2018-11-15

## 2018-11-15 RX ADMIN — MESNA 1688 MG: 100 INJECTION, SOLUTION INTRAVENOUS at 02:11

## 2018-11-15 RX ADMIN — SODIUM CHLORIDE: 9 INJECTION, SOLUTION INTRAVENOUS at 01:11

## 2018-11-15 RX ADMIN — ONDANSETRON 8 MG: 2 INJECTION INTRAMUSCULAR; INTRAVENOUS at 01:11

## 2018-11-15 RX ADMIN — DEXAMETHASONE SODIUM PHOSPHATE: 4 INJECTION, SOLUTION INTRA-ARTICULAR; INTRALESIONAL; INTRAMUSCULAR; INTRAVENOUS; SOFT TISSUE at 01:11

## 2018-11-15 RX ADMIN — CYCLOPHOSPHAMIDE 1690 MG: 1 INJECTION, POWDER, FOR SOLUTION INTRAVENOUS; ORAL at 02:11

## 2018-11-15 NOTE — Clinical Note
-cbc, cmp, t+S lab only at [facility] in 2 weeks-same labs, NP appt, cytoxan infusion in 4 weeks

## 2018-11-15 NOTE — PLAN OF CARE
Problem: Patient Care Overview (Adult)  Goal: Plan of Care Review  Outcome: Ongoing (interventions implemented as appropriate)  Pt tolerated Cytoxan with no complications. VSS. Pt instructed to call MD with any problems. NAD. Pt discharged home independently with spouse at side.

## 2018-11-15 NOTE — PROGRESS NOTES
SECTION OF HEMATOLOGY AND BONE MARROW TRANSPLANT    11/15/2018  Referred by:  Dr. Love  Referred for: Cytoxan    CHIEF COMPLAINT:   No chief complaint on file.      HISTORY OF PRESENT ILLNESS:   Presents today to clinic with wife to resume cytoxan. This would be dose (#13) of Cytoxan for CNS Vasculitis.  Neuro Symptoms significantly improved so we stopped infusions but returned so recommendations of neurologist to resume infusions.    Has thus Tolerated ctx well.  Denies fever, chills, nightsweats, bleeding, bruising, lymphadenopathy, n/v/d/c, signs/symptoms of splenomegaly.  Comes to clinic with wife.     PAST MEDICAL HISTORY:   Past Medical History:   Diagnosis Date    Amblyopia     Cataract     CNS vasculitis 6/2/2017    Follows with Dr. Love By mri.  Several active lesions, many old. 5/13: MRA brain/neck, MRI brain w/wo contrast show no vascular occlusion, multiple foci of hyperintensity in deep cerebral periventricular white matter in pattern of demyelinating process.  5/17: MRI spine performed, no spinal cord lesions. Hospitalization 6/2017. EEG was performed negative for seizures.  Repeat MRI showing new lesion, question whether this was demyelination versus CVA. 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 Findings compatible with a small acute lacunar infarct adjacent to the left frontal horn.  Nonspecific enhancement just inferior to this region and extending into the left basal ganglia, as well as within the inferior aspect of the left cerebellum.  No evidence of a focal mass. 2.  Extensive increased signal intensity involving the periventricular white matter compatible with demyelinating disease versus vasculitis. 3.  Clinical correlation and followup recommended. 4.  This reportedly  flattened in the EPIC and the corpus callosum medical record system.    Mixed hyperlipidemia 11/9/2012    NAFLD (nonalcoholic fatty liver disease) 10/11/2013    CHASE (nonalcoholic steatohepatitis)     Obesity     Stroke     Type 2 diabetes mellitus with diabetic polyneuropathy, with long-term current use of insulin 5/14/2017    Type 2 diabetes mellitus with hyperglycemia, with long-term current use of insulin 10/11/2013       PAST SURGICAL HISTORY:   Past Surgical History:   Procedure Laterality Date    BIOPSY LIVER AND ULTRASOUND N/A 6/9/2015    [facility]formed [facility] 2ND FLR    COLONOSCOPY N/A 2/21/2014    [facility]    ESOPHAGOGASTRODUODENOSCOPY (EGD) N/A 5/29/2017    Performed by Hai Carter MD at [facility] ENDO (2ND FLR)    SKIN CANCER EXCISION         PAST SOCIAL HISTORY:   reports that  has never smoked. he has never used smokeless tobacco. He reports that he does not drink alcohol or use drugs.    FAMILY HISTORY:  Family History   Problem Relation Age of Onset    Heart disease Mother     Hypertension Mother     Heart failure Mother     Cataracts Mother     Cancer Father         lung    Diabetes Maternal Aunt     Stroke Sister     Rheum arthritis Paternal Grandmother     Amblyopia Neg Hx     Blindness Neg Hx     Glaucoma Neg Hx     Macular degeneration Neg Hx     Retinal detachment Neg Hx     Strabismus Neg Hx        CURRENT MEDICATIONS:   Current Outpatient Medications   Medication Sig    alclomethasone (ACLOVATE) 0.05 % cream     aspirin (ECOTRIN) 81 MG EC tablet Take 81 mg by mouth once daily.    atenolol (TENORMIN) 50 MG tablet Take 1 tablet (50 mg total) by mouth once daily.    atorvastatin (LIPITOR) 40 MG tablet Take 1 tablet (40 mg total) by mouth once daily.    azaTHIOprine (IMURAN) 50 mg Tab     blood sugar diagnostic (TRUE METRIX GLUCOSE TEST STRIP) Strp Test blood sugar four times a day    diltiaZEM (DILACOR  "XR) 240 MG CDCR Take 1 capsule (240 mg total) by mouth once daily.    insulin aspart U-100 (NOVOLOG) 100 unit/mL InPn pen Inject 20 Units into the skin 3 (three) times daily with meals.    insulin glargine (BASAGLAR KWIKPEN U-100 INSULIN) 100 unit/mL (3 mL) InPn pen Inject 40 Units into the skin 2 (two) times daily.    insulin syringe-needle U-100 (INSULIN SYRINGE) 1/2 mL 30 gauge x 5/16 Syrg Use 3x/day    irbesartan (AVAPRO) 300 MG tablet Take 1 tablet (300 mg total) by mouth every evening.    ketoconazole (NIZORAL) 2 % cream     lancets 30 gauge Misc Test blood sugar four times a day    liraglutide 0.6 mg/0.1 mL, 18 mg/3 mL, subq PNIJ (VICTOZA 3-TOMASZ) 0.6 mg/0.1 mL (18 mg/3 mL) PnIj Inject 1.8 mg into the skin once daily.    metFORMIN (GLUCOPHAGE-XR) 500 MG 24 hr tablet Take 2 tablets (1,000 mg total) by mouth 2 (two) times daily with meals.    NOVOFINE PLUS 32 gauge x 1/6" Ndle     omega-3 fatty acids-vitamin E (FISH OIL) 1,000 mg Cap Take 1 capsule by mouth 2 (two) times daily.    pen needle, diabetic (BD ULTRA-FINE ANA PEN NEEDLE) 32 gauge x 5/32" Ndle Use one needle twice daily    pen needle, diabetic (BD ULTRA-FINE ANA PEN NEEDLE) 32 gauge x 5/32" Ndle 4 times daily insulin administration    sertraline (ZOLOFT) 50 MG tablet Take 1 tablet (50 mg total) by mouth once daily. Weaning off effexor    vitamin D 1000 units Tab Take 5 tablets (5,000 Units total) by mouth once daily.    venlafaxine (EFFEXOR) 37.5 MG Tab Take 1 tablet (37.5 mg total) by mouth once daily. Then stop the effexor stay on zoloft for 7 days     No current facility-administered medications for this visit.      ALLERGIES:   Review of patient's allergies indicates:  No Known Allergies    REVIEW OF SYSTEMS:   General ROS: Positive for fatigue.   Psychological ROS: Confusion much improved.   Ophthalmic ROS: negative  ENT ROS: negative  Allergy and Immunology ROS: negative  Hematological and Lymphatic ROS: negative  Endocrine ROS: " negative  Respiratory ROS: negative  Cardiovascular ROS: negative  Gastrointestinal ROS: negative  Genito-Urinary ROS: negative  Musculoskeletal ROS: chronic back pain  Neurological ROS: see HPI  Dermatological ROS: negative    PHYSICAL EXAM:   Vitals:    11/15/18 1145   BP: 127/79   Pulse: 69   Resp: 17   Temp: 98.7 °F (37.1 °C)       General - well developed, well nourished; much more interactive today. A+O x3  Head & Face - no sinus tenderness  Eyes - normal conjunctivae and lids   ENT - normal external auditory canals, no mouth sores  Chest and Lung - normal respiratory effort, clear to auscultation bilaterally   Cardiovascular - RRR with no MGR, normal S1 and S2; no pedal edema  Abdomen -  soft, nontender, no palpable hepatomegaly or splenomegaly  Lymph - no palpable lymphadenopathy  Extremities - unremarkable nails and digits  Heme - no bruising, petechiae, pallor  Skin - no rashes or lesions  Psych - Normal mood and affect. No confusion noted.  MS- wearing brace    ECOG Performance Status: (foot note - ECOG PS provided by Eastern Cooperative Oncology Group) 1 - Symptomatic but completely ambulatory    Karnofsky Performance Score:  80%- Normal Activity with Effort: Some Symptoms of Disease  DATA:   Lab Results   Component Value Date    WBC 7.34 11/15/2018    HGB 11.2 (L) 11/15/2018    HCT 33.6 (L) 11/15/2018    MCV 85 11/15/2018     11/15/2018     Gran # (ANC)   Date Value Ref Range Status   11/15/2018 5.5 1.8 - 7.7 K/uL Final     Gran%   Date Value Ref Range Status   11/15/2018 75.5 (H) 38.0 - 73.0 % Final     CMP  Sodium   Date Value Ref Range Status   11/15/2018 144 136 - 145 mmol/L Final     Potassium   Date Value Ref Range Status   11/15/2018 4.7 3.5 - 5.1 mmol/L Final     Chloride   Date Value Ref Range Status   11/15/2018 108 95 - 110 mmol/L Final     CO2   Date Value Ref Range Status   11/15/2018 27 23 - 29 mmol/L Final     Glucose   Date Value Ref Range Status   11/15/2018 139 (H) 70 - 110  mg/dL Final     BUN, Bld   Date Value Ref Range Status   11/15/2018 12 6 - 20 mg/dL Final     Creatinine   Date Value Ref Range Status   11/15/2018 1.0 0.5 - 1.4 mg/dL Final     Calcium   Date Value Ref Range Status   11/15/2018 9.7 8.7 - 10.5 mg/dL Final     Total Protein   Date Value Ref Range Status   11/15/2018 7.0 6.0 - 8.4 g/dL Final     Albumin   Date Value Ref Range Status   11/15/2018 3.7 3.5 - 5.2 g/dL Final   10/11/2013 4.3 3.6 - 5.1 g/dL Final     Comment:     @ Test Performed By:  Silenseed Vera Vance Estrada M.D., AMELIA,   [address] [place] [id number]  [facility] #[id number]     Total Bilirubin   Date Value Ref Range Status   11/15/2018 0.7 0.1 - 1.0 mg/dL Final     Comment:     For infants and newborns, interpretation of results should be based  on gestational age, weight and in agreement with clinical  observations.  Premature Infant recommended reference ranges:  Up to 24 hours.............<8.0 mg/dL  Up to 48 hours............<12.0 mg/dL  3-5 days..................<15.0 mg/dL  6-29 days.................<15.0 mg/dL       Alkaline Phosphatase   Date Value Ref Range Status   11/15/2018 76 55 - 135 U/L Final     AST   Date Value Ref Range Status   11/15/2018 27 10 - 40 U/L Final     ALT   Date Value Ref Range Status   11/15/2018 20 10 - 44 U/L Final     Anion Gap   Date Value Ref Range Status   11/15/2018 9 8 - 16 mmol/L Final     eGFR if    Date Value Ref Range Status   11/15/2018 >60.0 >60 mL/min/1.73 m^2 Final     eGFR if non    Date Value Ref Range Status   11/15/2018 >60.0 >60 mL/min/1.73 m^2 Final     Comment:     Calculation used to obtain the estimated glomerular filtration  rate (eGFR) is the CKD-EPI equation.            ASSESSMENT AND PLAN:   Encounter Diagnoses   Name Primary?    Encounter for chemotherapy management Yes    CNS vasculitis      CNS vasculitis  -resume CTX per neuro recs  -proceed with dose #13   Cytoxan was increased to 750mg/m2 at dose +10 with mesna support (mesna dose also increased) for CNS vasculitis, protocol per Dr. Love; improved symptoms at increased dose   -willl clarify planned duration of monthly therapy with neurology  -No contraindication to proceed with CTX today    Cytopenias  -mild anemia 2/2 chemotherapy  -Transfuse for Hgb<7 and Plt <10k, no indication to transfuse today  -Pt educated to notify us of any bleeding or fevers or S/S infection.      F/U: cbc, cmp, t+s lab only in 2 weeks  -same labs, NP appt, cytoxan infusion in 4 weeks  -same labs 6 weeks  -same labs MD appt cytoxan infusion in 8 weeks   Distress Screening Results: Psychosocial Distress screening score of Distress Score: 0 noted and reviewed. No intervention indicated.

## 2018-11-19 ENCOUNTER — OFFICE VISIT (OUTPATIENT)
Dept: PSYCHIATRY | Facility: CLINIC | Age: 58
End: 2018-11-19
Payer: COMMERCIAL

## 2018-11-19 DIAGNOSIS — F32.A DEPRESSIVE DISORDER: Primary | ICD-10-CM

## 2018-11-19 DIAGNOSIS — Z63.8 FAMILY CONFLICT: ICD-10-CM

## 2018-11-19 DIAGNOSIS — F02.818 MAJOR NEUROCOGNITIVE DISORDER DUE TO ANOTHER MEDICAL CONDITION WITH BEHAVIORAL DISTURBANCE: ICD-10-CM

## 2018-11-19 PROCEDURE — 90847 FAMILY PSYTX W/PT 50 MIN: CPT | Mod: S$GLB,,, | Performed by: SOCIAL WORKER

## 2018-11-19 SDOH — SOCIAL DETERMINANTS OF HEALTH (SDOH): OTHER SPECIFIED PROBLEMS RELATED TO PRIMARY SUPPORT GROUP: Z63.8

## 2018-11-19 NOTE — PROGRESS NOTES
Family Psychotherapy (PhD/LCSW)    11/19/2018    Site: [facility]    Length of service: 30 (family arrived 45 minutes late)    Therapeutic intervention: 91002 (30 mins only) -Family therapy with patient; needed because Bessy has neurocognitive disorder and needs support of family to be able to implement any changes/recommendations    Persons present: Bessy, wife Bekah    Interval history:  Bessy arrived to session casually dressed and with adequate hygiene, although his facial hair has continues to be overgrown and unkempt.  He was somewhat distracted during session, looking at the hubbard and pictures.  Bekah reports improvement in pt's energy and engagement in conversations since resuming Cytoxan infusions, but he continues with irritability and apathy.  Bekah shared that she has disengaged from arguments more frequently and finds that this has been helpful.  She also believes this has been easier to do since she started taking medication for her own depression.  Bessy and Bekah are visiting family regularly and have home health visits during the week, so Bessy is staying engaged in activities.  They plan to visit family for Thanksgiving, and Bessy expressed frustration with the way Bekah's family celebrates.  We brainstormed some solutions for Bessy, which Bekah can support, so that he still has some say in how he spends the day.  Bekah said her sisters are helping at the house with cleaning, but they do not have a private duty aide, yet.      Target symptoms: recurrent depression, adjustment     Patient's interpersonal/verbal exchanges: 07709 (only 30 mins as family was 45 mins later and this was all that could be offered) -Family therapy with patient.  Bessy's insight and judgement are limited.  Bekah reports disengaging from arguments, more.  She was supportive of him in session.    Progress toward goals: Some progress    Diagnosis:  F32.9 Unspecified Depressive Disorder    F02.81 Major  neurocognitive disorder due to another medical condition with behavioral disturbance    Z63.8 Family Conflict     Plan: family psychotherapy       Return to clinic: 1 week

## 2018-11-29 ENCOUNTER — DOCUMENTATION ONLY (OUTPATIENT)
Dept: HEMATOLOGY/ONCOLOGY | Facility: CLINIC | Age: 58
End: 2018-11-29

## 2018-11-29 ENCOUNTER — LAB VISIT (OUTPATIENT)
Dept: LAB | Facility: HOSPITAL | Age: 58
End: 2018-11-29
Attending: INTERNAL MEDICINE
Payer: COMMERCIAL

## 2018-11-29 DIAGNOSIS — T45.1X5A CHEMOTHERAPY-INDUCED NEUTROPENIA: ICD-10-CM

## 2018-11-29 DIAGNOSIS — D70.1 CHEMOTHERAPY-INDUCED NEUTROPENIA: ICD-10-CM

## 2018-11-29 LAB
ABO + RH BLD: NORMAL
ALBUMIN SERPL BCP-MCNC: 3.7 G/DL
ALP SERPL-CCNC: 92 U/L
ALT SERPL W/O P-5'-P-CCNC: 28 U/L
ANION GAP SERPL CALC-SCNC: 9 MMOL/L
ANISOCYTOSIS BLD QL SMEAR: SLIGHT
AST SERPL-CCNC: 24 U/L
BASOPHILS # BLD AUTO: ABNORMAL K/UL
BASOPHILS NFR BLD: 5 %
BILIRUB SERPL-MCNC: 1 MG/DL
BLD GP AB SCN CELLS X3 SERPL QL: NORMAL
BUN SERPL-MCNC: 17 MG/DL
CALCIUM SERPL-MCNC: 9.7 MG/DL
CHLORIDE SERPL-SCNC: 108 MMOL/L
CO2 SERPL-SCNC: 25 MMOL/L
CREAT SERPL-MCNC: 1.2 MG/DL
DACRYOCYTES BLD QL SMEAR: ABNORMAL
DIFFERENTIAL METHOD: ABNORMAL
EOSINOPHIL # BLD AUTO: ABNORMAL K/UL
EOSINOPHIL NFR BLD: 10 %
ERYTHROCYTE [DISTWIDTH] IN BLOOD BY AUTOMATED COUNT: 15 %
EST. GFR  (AFRICAN AMERICAN): >60 ML/MIN/1.73 M^2
EST. GFR  (NON AFRICAN AMERICAN): >60 ML/MIN/1.73 M^2
GLUCOSE SERPL-MCNC: 228 MG/DL
HCT VFR BLD AUTO: 31.8 %
HGB BLD-MCNC: 10.6 G/DL
HYPOCHROMIA BLD QL SMEAR: ABNORMAL
LYMPHOCYTES # BLD AUTO: ABNORMAL K/UL
LYMPHOCYTES NFR BLD: 51 %
MCH RBC QN AUTO: 28.4 PG
MCHC RBC AUTO-ENTMCNC: 33.3 G/DL
MCV RBC AUTO: 85 FL
MONOCYTES # BLD AUTO: ABNORMAL K/UL
MONOCYTES NFR BLD: 29 %
NEUTROPHILS NFR BLD: 5 %
OVALOCYTES BLD QL SMEAR: ABNORMAL
PLATELET # BLD AUTO: 220 K/UL
PLATELET BLD QL SMEAR: ABNORMAL
PMV BLD AUTO: 8.2 FL
POLYCHROMASIA BLD QL SMEAR: ABNORMAL
POTASSIUM SERPL-SCNC: 4.3 MMOL/L
PROT SERPL-MCNC: 6.8 G/DL
RBC # BLD AUTO: 3.73 M/UL
SODIUM SERPL-SCNC: 142 MMOL/L
WBC # BLD AUTO: 1.56 K/UL

## 2018-11-29 PROCEDURE — 86850 RBC ANTIBODY SCREEN: CPT

## 2018-11-29 PROCEDURE — 85027 COMPLETE CBC AUTOMATED: CPT

## 2018-11-29 PROCEDURE — 80053 COMPREHEN METABOLIC PANEL: CPT

## 2018-11-29 PROCEDURE — 85007 BL SMEAR W/DIFF WBC COUNT: CPT

## 2018-11-29 PROCEDURE — 36415 COLL VENOUS BLD VENIPUNCTURE: CPT

## 2018-11-30 ENCOUNTER — OFFICE VISIT (OUTPATIENT)
Dept: OPHTHALMOLOGY | Facility: CLINIC | Age: 58
End: 2018-11-30
Payer: COMMERCIAL

## 2018-11-30 VITALS — SYSTOLIC BLOOD PRESSURE: 109 MMHG | HEART RATE: 66 BPM | DIASTOLIC BLOOD PRESSURE: 65 MMHG

## 2018-11-30 DIAGNOSIS — Z79.4 TYPE 2 DIABETES MELLITUS WITH BOTH EYES AFFECTED BY SEVERE NONPROLIFERATIVE RETINOPATHY WITHOUT MACULAR EDEMA, WITH LONG-TERM CURRENT USE OF INSULIN: Primary | ICD-10-CM

## 2018-11-30 DIAGNOSIS — E11.3493 TYPE 2 DIABETES MELLITUS WITH BOTH EYES AFFECTED BY SEVERE NONPROLIFERATIVE RETINOPATHY WITHOUT MACULAR EDEMA, WITH LONG-TERM CURRENT USE OF INSULIN: Primary | ICD-10-CM

## 2018-11-30 PROCEDURE — 99499 UNLISTED E&M SERVICE: CPT | Mod: S$GLB,,, | Performed by: OPHTHALMOLOGY

## 2018-11-30 PROCEDURE — 99999 PR PBB SHADOW E&M-EST. PATIENT-LVL II: CPT | Mod: PBBFAC,,, | Performed by: OPHTHALMOLOGY

## 2018-11-30 PROCEDURE — 67228 TREATMENT X10SV RETINOPATHY: CPT | Mod: LT,S$GLB,, | Performed by: OPHTHALMOLOGY

## 2018-11-30 NOTE — PROGRESS NOTES
HPI     DLS 11/05/18    57 Y/O M here today for Laser PRP OS today.   Pt reports   no complications afte last laser treatment. stj       this morning 11/30/18 stj     POhx   1. DM W NP RETINOPATHY OU      S/P Laser PRP OS 11/05/18     2.CATARACT OU     Last edited by Isael Mixon MD on 11/30/2018  5:23 PM. (History)            Assessment /Plan     For exam results, see Encounter Report.    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  The patient voiced understanding and wished to proceed with the procedure.  See separate consent form.    Patient identified.  Timeout performed.    Laser Procedure Note  Dx: Severe NPDR OS with ischemia  Laser Procedure: PRP OS  Anesthetic: Topical Proparacaine  Argon Yellow  Spot size: 200 microns  Power: 100-160 mW  Dur: 100 ms  # of spots:  928  Location: sup periphery  Complications: None  F/U as above, sooner PRN any changes

## 2018-12-03 DIAGNOSIS — Z51.11 ENCOUNTER FOR CHEMOTHERAPY MANAGEMENT: Primary | ICD-10-CM

## 2018-12-05 NOTE — ASSESSMENT & PLAN NOTE
No intervention p[er NS  Plan on SNF or rehab admit  Painin reasonable control  Back brace placed     Telephone Encounter by Peabody, Diane, RN at 12/05/17 04:45 PM     Author:  Peabody, Diane, RN Service:  (none) Author Type:  Registered Nurse     Filed:  12/05/17 04:57 PM Encounter Date:  12/5/2017 Status:  Signed     :  Peabody, Diane, RN (Registered Nurse)            10w2d[DP1.1T]   Frozen embryo transfer 10.13.2017, EDC 7/1/2018  Pt of Dr. Elam - last appointment with him 12.6.2017.  NOB with RN today.  Pt did not bring records from Dr. Elam's office.  Asked her obtain these tomorrow and bring to next appointment.  --Pt also asking if it is ok to eat summer sausage.  States she ate it with her last pregnancy but was told by someone to avoid due to the sodium nitrates.  --Pt's most recent US with Dr. Elam 12/5/2017 - ok to schedule First Trimester US with [facility]?   --Pt will need [place] for NOB, prefers [facility] location.    Routed to Dr. Waldron --[DP1.2M] Please advise[DP1.2T] on above.[DP1.2M]      Revision History        User Key Date/Time User Provider Type Action    > DP1.1 12/05/17 04:57 PM Peabody, Diane, RN Registered Nurse Sign     DP1.2 12/05/17 04:45 PM Peabody, Diane, RN Registered Nurse     M - Manual, T - Template

## 2018-12-06 DIAGNOSIS — Z51.11 ENCOUNTER FOR CHEMOTHERAPY MANAGEMENT: Primary | ICD-10-CM

## 2018-12-07 ENCOUNTER — HOSPITAL ENCOUNTER (OUTPATIENT)
Facility: HOSPITAL | Age: 58
Discharge: HOME | End: 2018-12-07
Attending: INTERNAL MEDICINE | Admitting: INTERNAL MEDICINE
Payer: COMMERCIAL

## 2018-12-07 VITALS
HEART RATE: 70 BPM | HEIGHT: 70 IN | TEMPERATURE: 98 F | BODY MASS INDEX: 33.64 KG/M2 | DIASTOLIC BLOOD PRESSURE: 62 MMHG | OXYGEN SATURATION: 98 % | RESPIRATION RATE: 18 BRPM | WEIGHT: 235 LBS | SYSTOLIC BLOOD PRESSURE: 130 MMHG

## 2018-12-07 DIAGNOSIS — Z51.11 ENCOUNTER FOR CHEMOTHERAPY MANAGEMENT: ICD-10-CM

## 2018-12-07 LAB — POCT GLUCOSE: 194 MG/DL (ref 70–110)

## 2018-12-07 PROCEDURE — 25000003 PHARM REV CODE 250: Performed by: INTERNAL MEDICINE

## 2018-12-07 PROCEDURE — 82962 GLUCOSE BLOOD TEST: CPT

## 2018-12-07 PROCEDURE — 63600175 PHARM REV CODE 636 W HCPCS: Performed by: FAMILY MEDICINE

## 2018-12-07 PROCEDURE — 25000003 PHARM REV CODE 250: Performed by: FAMILY MEDICINE

## 2018-12-07 PROCEDURE — 63600175 PHARM REV CODE 636 W HCPCS: Performed by: RADIOLOGY

## 2018-12-07 RX ORDER — FENTANYL CITRATE 50 UG/ML
50 INJECTION, SOLUTION INTRAMUSCULAR; INTRAVENOUS
Status: DISCONTINUED | OUTPATIENT
Start: 2018-12-07 | End: 2018-12-07 | Stop reason: HOSPADM

## 2018-12-07 RX ORDER — LIDOCAINE HYDROCHLORIDE 10 MG/ML
1 INJECTION INFILTRATION; PERINEURAL ONCE
Status: COMPLETED | OUTPATIENT
Start: 2018-12-07 | End: 2018-12-07

## 2018-12-07 RX ORDER — FENTANYL CITRATE 50 UG/ML
INJECTION, SOLUTION INTRAMUSCULAR; INTRAVENOUS CODE/TRAUMA/SEDATION MEDICATION
Status: COMPLETED | OUTPATIENT
Start: 2018-12-07 | End: 2018-12-07

## 2018-12-07 RX ORDER — CEFAZOLIN SODIUM 1 G/3ML
1 INJECTION, POWDER, FOR SOLUTION INTRAMUSCULAR; INTRAVENOUS
Status: COMPLETED | OUTPATIENT
Start: 2018-12-07 | End: 2018-12-07

## 2018-12-07 RX ORDER — MIDAZOLAM HYDROCHLORIDE 1 MG/ML
1 INJECTION INTRAMUSCULAR; INTRAVENOUS
Status: DISCONTINUED | OUTPATIENT
Start: 2018-12-07 | End: 2018-12-07 | Stop reason: HOSPADM

## 2018-12-07 RX ORDER — MIDAZOLAM HYDROCHLORIDE 1 MG/ML
INJECTION INTRAMUSCULAR; INTRAVENOUS CODE/TRAUMA/SEDATION MEDICATION
Status: COMPLETED | OUTPATIENT
Start: 2018-12-07 | End: 2018-12-07

## 2018-12-07 RX ORDER — HEPARIN 100 UNIT/ML
SYRINGE INTRAVENOUS CODE/TRAUMA/SEDATION MEDICATION
Status: COMPLETED | OUTPATIENT
Start: 2018-12-07 | End: 2018-12-07

## 2018-12-07 RX ORDER — SODIUM CHLORIDE 9 MG/ML
500 INJECTION, SOLUTION INTRAVENOUS ONCE
Status: COMPLETED | OUTPATIENT
Start: 2018-12-07 | End: 2018-12-07

## 2018-12-07 RX ADMIN — FENTANYL CITRATE 50 MCG: 50 INJECTION, SOLUTION INTRAMUSCULAR; INTRAVENOUS at 12:12

## 2018-12-07 RX ADMIN — HEPARIN SODIUM (PORCINE) LOCK FLUSH IV SOLN 100 UNIT/ML 5 ML: 100 SOLUTION at 12:12

## 2018-12-07 RX ADMIN — MIDAZOLAM HYDROCHLORIDE 1 MG: 1 INJECTION, SOLUTION INTRAMUSCULAR; INTRAVENOUS at 12:12

## 2018-12-07 RX ADMIN — MIDAZOLAM HYDROCHLORIDE 1 MG: 1 INJECTION, SOLUTION INTRAMUSCULAR; INTRAVENOUS at 11:12

## 2018-12-07 RX ADMIN — CEFAZOLIN 1 G: 1 INJECTION, POWDER, FOR SOLUTION INTRAMUSCULAR; INTRAVENOUS at 11:12

## 2018-12-07 RX ADMIN — FENTANYL CITRATE 50 MCG: 50 INJECTION, SOLUTION INTRAMUSCULAR; INTRAVENOUS at 11:12

## 2018-12-07 RX ADMIN — LIDOCAINE HYDROCHLORIDE: 10 INJECTION, SOLUTION EPIDURAL; INFILTRATION; INTRACAUDAL; PERINEURAL at 09:12

## 2018-12-07 RX ADMIN — SODIUM CHLORIDE 500 ML: 0.9 INJECTION, SOLUTION INTRAVENOUS at 09:12

## 2018-12-07 NOTE — PROGRESS NOTES
Right chest port placement completed, pt tolerated well. No apparent distress noted. Dressing applied CDI. Pt to be transferred to [facility], report to be given at bedside.

## 2018-12-07 NOTE — DISCHARGE INSTRUCTIONS
Please call with any questions or concerns.      Monday thru Friday 8:00 am - 4:30 pm    Interventional Radiology   [phone]    After Hours    Ask for the Radiology Intern on call  [phone]

## 2018-12-07 NOTE — H&P
Radiology History & Physical      SUBJECTIVE:     Chief Complaint: CNS vasculitis    History of Present Illness:  Bessy Nunez is a 58 y.o. male who presents for Port placement.    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 Love By mri.  Several active lesions, many old. 5/13: MRA brain/neck, MRI brain w/wo contrast show no vascular occlusion, multiple foci of hyperintensity in deep cerebral periventricular white matter in pattern of demyelinating process.  5/17: MRI spine performed, no spinal cord lesions. Hospitalization 6/2017. EEG was performed negative for seizures.  Repeat MRI showing new lesion, question whether this was demyelination versus CVA. 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 Findings compatible with a small acute lacunar infarct adjacent to the left frontal horn.  Nonspecific enhancement just inferior to this region and extending into the left basal ganglia, as well as within the inferior aspect of the left cerebellum.  No evidence of a focal mass. 2.  Extensive increased signal intensity involving the periventricular white matter compatible with demyelinating disease versus vasculitis. 3.  Clinical correlation and followup recommended. 4.  This reportedly flattened in the EPIC and the corpus callosum medical record system.    Mixed hyperlipidemia 11/9/2012    NAFLD (nonalcoholic fatty liver disease) 10/11/2013    CHASE (nonalcoholic steatohepatitis)     Obesity     Stroke     Type 2 diabetes mellitus with diabetic polyneuropathy, with long-term current use of insulin 5/14/2017    Type 2 diabetes mellitus with hyperglycemia, with long-term current use of insulin 10/11/2013     Past Surgical History:    Procedure Laterality Date    BIOPSY LIVER AND ULTRASOUND N/A 6/9/2015    [facility]formed by [facility] OR 2ND FLR    COLONOSCOPY N/A 2/21/2014    Performed by [facility] ENDO    ESOPHAGOGASTRODUODENOSCOPY (EGD) N/A 5/29/2017    Performed by Hai Carter MD at [facility] ENDO (2ND FLR)    SKIN CANCER EXCISION         Home Meds:   Prior to Admission medications    Medication Sig Start Date End Date Taking? Authorizing Provider   alclomethasone (ACLOVATE) 0.05 % cream  4/10/18   Historical Provider, MD   aspirin (ECOTRIN) 81 MG EC tablet Take 81 mg by mouth once daily.    Historical Provider, MD   atenolol (TENORMIN) 50 MG tablet Take 1 tablet (50 mg total) by mouth once daily. 8/1/18   Edgar Nova MD   atorvastatin (LIPITOR) 40 MG tablet Take 1 tablet (40 mg total) by mouth once daily. 8/1/18   Edgar Nova MD   azaTHIOprine (IMURAN) 50 mg Tab  10/21/18   Historical Provider, MD   blood sugar diagnostic (TRUE METRIX GLUCOSE TEST STRIP) Strp Test blood sugar four times a day 11/5/18   Edgar Nova MD   diltiaZEM (DILACOR XR) 240 MG CDCR Take 1 capsule (240 mg total) by mouth once daily. 8/1/18   Edgar Nova MD   insulin aspart U-100 (NOVOLOG) 100 unit/mL InPn pen Inject 20 Units into the skin 3 (three) times daily with meals. 8/15/18 8/15/19  Edgar Nova MD   insulin glargine (BASAGLAR KWIKPEN U-100 INSULIN) 100 unit/mL (3 mL) InPn pen Inject 40 Units into the skin 2 (two) times daily. 8/15/18 8/15/19  Edgar Nova MD   insulin syringe-needle U-100 (INSULIN SYRINGE) 1/2 mL 30 gauge x 5/16 Syrg Use 3x/day 10/10/17   Promise Steele NP   irbesartan (AVAPRO) 300 MG tablet Take 1 tablet (300 mg total) by mouth every evening. 8/1/18 8/1/19  Edgar Nova MD   ketoconazole (NIZORAL) 2 % cream  4/10/18   Historical Provider, MD   lancets 30 gauge Misc Test blood sugar four times a day 8/15/18   Edgar Nova MD   liraglutide 0.6 mg/0.1 mL, 18 mg/3 mL, subq PNIJ  "(VICTOZA 3-TOMASZ) 0.6 mg/0.1 mL (18 mg/3 mL) PnIj Inject 1.8 mg into the skin once daily. 8/15/18   Edgar Nova MD   metFORMIN (GLUCOPHAGE-XR) 500 MG 24 hr tablet Take 2 tablets (1,000 mg total) by mouth 2 (two) times daily with meals. 8/1/18   Edgar Nova MD   NOVOFINE PLUS 32 gauge x 1/6" Ndle  12/6/17   Historical Provider, MD   omega-3 fatty acids-vitamin E (FISH OIL) 1,000 mg Cap Take 1 capsule by mouth 2 (two) times daily. 3/2/17   Promise Steele NP   pen needle, diabetic (BD ULTRA-FINE ANA PEN NEEDLE) 32 gauge x 5/32" Ndle Use one needle twice daily 6/5/18   Geno Reyes, NP   pen needle, diabetic (BD ULTRA-FINE ANA PEN NEEDLE) 32 gauge x 5/32" Ndle 4 times daily insulin administration 11/5/18   Edgar Nova MD   sertraline (ZOLOFT) 50 MG tablet Take 1 tablet (50 mg total) by mouth once daily. Weaning off effexor 11/5/18 11/5/19  Edgar Nova MD   venlafaxine (EFFEXOR) 37.5 MG Tab Take 1 tablet (37.5 mg total) by mouth once daily. Then stop the effexor stay on zoloft for 7 days 11/5/18 11/12/18  Edgar Nova MD   vitamin D 1000 units Tab Take 5 tablets (5,000 Units total) by mouth once daily. 5/18/17   Guanaco Story MD     Anticoagulants/Antiplatelets: aspirin last dose 5 days ago    Allergies: Review of patient's allergies indicates:  No Known Allergies  Sedation History:  no adverse reactions    Review of Systems:   Hematological: no known coagulopathies  Respiratory: no shortness of breath  Cardiovascular: no chest pain  Gastrointestinal: no abdominal pain  Genito-Urinary: no dysuria  Musculoskeletal: negative  Neurological: no TIA or stroke symptoms         OBJECTIVE:     Vital Signs (Most Recent)       Physical Exam:  ASA: 2  Mallampati: 2    General: no acute distress  Mental Status: alert and oriented to person, place and time  HEENT: normocephalic, atraumatic  Chest: unlabored breathing  Heart: regular heart rate  Abdomen: nondistended  Extremity: moves all " "extremities    Laboratory  Lab Results   Component Value Date    INR 1.0 12/07/2018       Lab Results   Component Value Date    WBC 7.28 12/07/2018    HGB 11.4 (L) 12/07/2018    HCT 33.6 (L) 12/07/2018    MCV 87 12/07/2018     12/07/2018      Lab Results   Component Value Date     (H) 11/29/2018     11/29/2018    K 4.3 11/29/2018     11/29/2018    CO2 25 11/29/2018    BUN 17 11/29/2018    CREATININE 1.2 11/29/2018    CALCIUM 9.7 11/29/2018    MG 1.8 10/20/2018    ALT 28 11/29/2018    AST 24 11/29/2018    ALBUMIN 3.7 11/29/2018    BILITOT 1.0 11/29/2018    BILIDIR 0.2 02/26/2015       ASSESSMENT/PLAN:     Sedation Plan: moderate  Patient will undergo port placement.    Evan Segal MD ([place])  Radiology PGY-5  [phone]      "

## 2018-12-07 NOTE — PROGRESS NOTES
Pt arrived to IR room 189 for Port placement, no acute distress noted. Orders, consent and labs reviewed on chart.

## 2018-12-07 NOTE — DISCHARGE SUMMARY
Radiology Discharge Summary      Hospital Course: No complications    Admit Date: 12/7/2018  Discharge Date: 12/07/2018     Instructions Given to Patient: Yes  Diet: Resume prior diet  Activity: activity as tolerated and no driving for today    Description of Condition on Discharge: Stable  Vital Signs (Most Recent): Temp: 98.1 °F (36.7 °C) (12/07/18 0937)  Pulse: 69 (12/07/18 1240)  Resp: 18 (12/07/18 1240)  BP: 104/67 (12/07/18 1240)  SpO2: 99 % (12/07/18 1240)    Discharge Disposition: Home    Discharge Diagnosis: Brain cancer s/p port placement. Follow up as scheduled.    Bryn Silvestre M.D.  Diagnostic and Interventional Radiologist  Department of Radiology  Pager: [phone]

## 2018-12-07 NOTE — PROCEDURES
Radiology Post-Procedure Note    Pre Op Diagnosis: Brain cancer    Post Op Diagnosis: Same    Procedure: PORT placement    Procedure performed by: Bryn Silvestre MD    Written Informed Consent Obtained: Yes    Specimen Removed: No    Estimated Blood Loss: Minimal    Findings:   Using realtime U/S guidance an 8 Fr port catheter was placed into the right IJ vein with tip of the catheter in the SVC.    Port is ready for use.     Bryn Silvestre M.D.  Diagnostic and Interventional Radiologist  Department of Radiology  Pager: [phone]

## 2018-12-07 NOTE — PROGRESS NOTES
Afia in IR notified pt took aspirin and fish oil yesterday, INR 1.0, states will notify staff MD and call me back.

## 2018-12-10 ENCOUNTER — TELEPHONE (OUTPATIENT)
Dept: PSYCHIATRY | Facility: CLINIC | Age: 58
End: 2018-12-10

## 2018-12-10 NOTE — TELEPHONE ENCOUNTER
Spoke with pt's wife; she had to cancel all of pt's appointments for today as she lost her wallet and did not have her 's license.  She later found the wallet and wants to reschedule.  Will forward to Elvia to reschedule with CONNER Boswell.

## 2018-12-13 ENCOUNTER — OFFICE VISIT (OUTPATIENT)
Dept: HEMATOLOGY/ONCOLOGY | Facility: CLINIC | Age: 58
End: 2018-12-13
Payer: COMMERCIAL

## 2018-12-13 ENCOUNTER — LAB VISIT (OUTPATIENT)
Dept: LAB | Facility: HOSPITAL | Age: 58
End: 2018-12-13
Payer: COMMERCIAL

## 2018-12-13 ENCOUNTER — INFUSION (OUTPATIENT)
Dept: INFUSION THERAPY | Facility: HOSPITAL | Age: 58
End: 2018-12-13
Attending: INTERNAL MEDICINE
Payer: COMMERCIAL

## 2018-12-13 VITALS
SYSTOLIC BLOOD PRESSURE: 144 MMHG | TEMPERATURE: 97 F | HEIGHT: 70 IN | RESPIRATION RATE: 20 BRPM | WEIGHT: 220 LBS | BODY MASS INDEX: 31.5 KG/M2 | DIASTOLIC BLOOD PRESSURE: 84 MMHG | OXYGEN SATURATION: 97 % | HEART RATE: 88 BPM

## 2018-12-13 VITALS
SYSTOLIC BLOOD PRESSURE: 131 MMHG | HEART RATE: 87 BPM | TEMPERATURE: 98 F | RESPIRATION RATE: 16 BRPM | DIASTOLIC BLOOD PRESSURE: 76 MMHG

## 2018-12-13 DIAGNOSIS — T45.1X5A CHEMOTHERAPY-INDUCED NEUTROPENIA: ICD-10-CM

## 2018-12-13 DIAGNOSIS — I77.6 CNS VASCULITIS: Primary | ICD-10-CM

## 2018-12-13 DIAGNOSIS — E11.42 TYPE 2 DIABETES MELLITUS WITH DIABETIC POLYNEUROPATHY, WITH LONG-TERM CURRENT USE OF INSULIN: ICD-10-CM

## 2018-12-13 DIAGNOSIS — D70.1 CHEMOTHERAPY-INDUCED NEUTROPENIA: ICD-10-CM

## 2018-12-13 DIAGNOSIS — D64.81 ANEMIA ASSOCIATED WITH CHEMOTHERAPY: ICD-10-CM

## 2018-12-13 DIAGNOSIS — I10 ESSENTIAL HYPERTENSION: ICD-10-CM

## 2018-12-13 DIAGNOSIS — Z51.11 ENCOUNTER FOR CHEMOTHERAPY MANAGEMENT: ICD-10-CM

## 2018-12-13 DIAGNOSIS — E78.2 MIXED HYPERLIPIDEMIA: ICD-10-CM

## 2018-12-13 DIAGNOSIS — F32.5 MAJOR DEPRESSIVE DISORDER WITH SINGLE EPISODE, IN FULL REMISSION: Chronic | ICD-10-CM

## 2018-12-13 DIAGNOSIS — T45.1X5A ANEMIA ASSOCIATED WITH CHEMOTHERAPY: ICD-10-CM

## 2018-12-13 DIAGNOSIS — Z79.4 TYPE 2 DIABETES MELLITUS WITH DIABETIC POLYNEUROPATHY, WITH LONG-TERM CURRENT USE OF INSULIN: ICD-10-CM

## 2018-12-13 LAB
ABO + RH BLD: NORMAL
ALBUMIN SERPL BCP-MCNC: 3.7 G/DL
ALP SERPL-CCNC: 96 U/L
ALT SERPL W/O P-5'-P-CCNC: 19 U/L
ANION GAP SERPL CALC-SCNC: 9 MMOL/L
AST SERPL-CCNC: 24 U/L
BASOPHILS # BLD AUTO: 0.03 K/UL
BASOPHILS NFR BLD: 0.4 %
BILIRUB SERPL-MCNC: 0.6 MG/DL
BLD GP AB SCN CELLS X3 SERPL QL: NORMAL
BUN SERPL-MCNC: 14 MG/DL
CALCIUM SERPL-MCNC: 10.3 MG/DL
CHLORIDE SERPL-SCNC: 107 MMOL/L
CO2 SERPL-SCNC: 27 MMOL/L
CREAT SERPL-MCNC: 1 MG/DL
DIFFERENTIAL METHOD: ABNORMAL
EOSINOPHIL # BLD AUTO: 0.1 K/UL
EOSINOPHIL NFR BLD: 1.4 %
ERYTHROCYTE [DISTWIDTH] IN BLOOD BY AUTOMATED COUNT: 15.4 %
EST. GFR  (AFRICAN AMERICAN): >60 ML/MIN/1.73 M^2
EST. GFR  (NON AFRICAN AMERICAN): >60 ML/MIN/1.73 M^2
GLUCOSE SERPL-MCNC: 186 MG/DL
HCT VFR BLD AUTO: 33.3 %
HGB BLD-MCNC: 10.9 G/DL
IMM GRANULOCYTES # BLD AUTO: 0.05 K/UL
IMM GRANULOCYTES NFR BLD AUTO: 0.7 %
LYMPHOCYTES # BLD AUTO: 1.1 K/UL
LYMPHOCYTES NFR BLD: 15.2 %
MCH RBC QN AUTO: 27.8 PG
MCHC RBC AUTO-ENTMCNC: 32.7 G/DL
MCV RBC AUTO: 85 FL
MONOCYTES # BLD AUTO: 0.5 K/UL
MONOCYTES NFR BLD: 6.5 %
NEUTROPHILS # BLD AUTO: 5.4 K/UL
NEUTROPHILS NFR BLD: 75.8 %
NRBC BLD-RTO: 0 /100 WBC
PLATELET # BLD AUTO: 168 K/UL
PMV BLD AUTO: 9.8 FL
POTASSIUM SERPL-SCNC: 3.6 MMOL/L
PROT SERPL-MCNC: 7.1 G/DL
RBC # BLD AUTO: 3.92 M/UL
SODIUM SERPL-SCNC: 143 MMOL/L
WBC # BLD AUTO: 7.12 K/UL

## 2018-12-13 PROCEDURE — 3008F BODY MASS INDEX DOCD: CPT | Mod: CPTII,S$GLB,, | Performed by: NURSE PRACTITIONER

## 2018-12-13 PROCEDURE — 3079F DIAST BP 80-89 MM HG: CPT | Mod: CPTII,S$GLB,, | Performed by: NURSE PRACTITIONER

## 2018-12-13 PROCEDURE — 96367 TX/PROPH/DG ADDL SEQ IV INF: CPT

## 2018-12-13 PROCEDURE — 86850 RBC ANTIBODY SCREEN: CPT

## 2018-12-13 PROCEDURE — 80053 COMPREHEN METABOLIC PANEL: CPT

## 2018-12-13 PROCEDURE — 99214 OFFICE O/P EST MOD 30 MIN: CPT | Mod: S$GLB,,, | Performed by: NURSE PRACTITIONER

## 2018-12-13 PROCEDURE — 63600175 PHARM REV CODE 636 W HCPCS: Performed by: INTERNAL MEDICINE

## 2018-12-13 PROCEDURE — 96413 CHEMO IV INFUSION 1 HR: CPT

## 2018-12-13 PROCEDURE — A4216 STERILE WATER/SALINE, 10 ML: HCPCS | Performed by: INTERNAL MEDICINE

## 2018-12-13 PROCEDURE — 99999 PR PBB SHADOW E&M-EST. PATIENT-LVL III: CPT | Mod: PBBFAC,,, | Performed by: NURSE PRACTITIONER

## 2018-12-13 PROCEDURE — 25000003 PHARM REV CODE 250: Performed by: INTERNAL MEDICINE

## 2018-12-13 PROCEDURE — 3074F SYST BP LT 130 MM HG: CPT | Mod: CPTII,S$GLB,, | Performed by: NURSE PRACTITIONER

## 2018-12-13 PROCEDURE — 3044F HG A1C LEVEL LT 7.0%: CPT | Mod: CPTII,S$GLB,, | Performed by: NURSE PRACTITIONER

## 2018-12-13 PROCEDURE — 85025 COMPLETE CBC W/AUTO DIFF WBC: CPT

## 2018-12-13 RX ORDER — HEPARIN 100 UNIT/ML
500 SYRINGE INTRAVENOUS
Status: DISCONTINUED | OUTPATIENT
Start: 2018-12-13 | End: 2018-12-13 | Stop reason: HOSPADM

## 2018-12-13 RX ORDER — SODIUM CHLORIDE 0.9 % (FLUSH) 0.9 %
10 SYRINGE (ML) INJECTION
Status: CANCELLED | OUTPATIENT
Start: 2018-12-13

## 2018-12-13 RX ORDER — ONDANSETRON HCL IN 0.9 % NACL 8 MG/50 ML
8 INTRAVENOUS SOLUTION, PIGGYBACK (ML) INTRAVENOUS
Status: CANCELLED | OUTPATIENT
Start: 2018-12-13 | End: 2018-12-13

## 2018-12-13 RX ORDER — ONDANSETRON HCL IN 0.9 % NACL 8 MG/50 ML
8 INTRAVENOUS SOLUTION, PIGGYBACK (ML) INTRAVENOUS
Status: COMPLETED | OUTPATIENT
Start: 2018-12-13 | End: 2018-12-13

## 2018-12-13 RX ORDER — SODIUM CHLORIDE 0.9 % (FLUSH) 0.9 %
10 SYRINGE (ML) INJECTION
Status: DISCONTINUED | OUTPATIENT
Start: 2018-12-13 | End: 2018-12-13 | Stop reason: HOSPADM

## 2018-12-13 RX ORDER — HEPARIN 100 UNIT/ML
500 SYRINGE INTRAVENOUS
Status: CANCELLED | OUTPATIENT
Start: 2018-12-13

## 2018-12-13 RX ADMIN — DEXAMETHASONE SODIUM PHOSPHATE: 4 INJECTION, SOLUTION INTRA-ARTICULAR; INTRALESIONAL; INTRAMUSCULAR; INTRAVENOUS; SOFT TISSUE at 02:12

## 2018-12-13 RX ADMIN — CYCLOPHOSPHAMIDE 1690 MG: 1 INJECTION, POWDER, FOR SOLUTION INTRAVENOUS; ORAL at 03:12

## 2018-12-13 RX ADMIN — HEPARIN 500 UNITS: 100 SYRINGE at 04:12

## 2018-12-13 RX ADMIN — Medication 10 ML: at 04:12

## 2018-12-13 RX ADMIN — SODIUM CHLORIDE 8 MG: 9 INJECTION, SOLUTION INTRAVENOUS at 02:12

## 2018-12-13 RX ADMIN — MESNA 1688 MG: 100 INJECTION, SOLUTION INTRAVENOUS at 03:12

## 2018-12-13 NOTE — Clinical Note
- cbc, cmp, t+s labs only in 2 weeks at [facility] ([place])- same labs with MD fine cytoxan infusion in 4 weeks

## 2018-12-13 NOTE — PROGRESS NOTES
SECTION OF HEMATOLOGY AND BONE MARROW TRANSPLANT    12/13/2018  Referred by:  Dr. Love  Referred for: Cytoxan      HISTORY OF PRESENT ILLNESS:   Presents today to clinic with wife to another cycle of chemotherapy. This would be dose (#14) of Cytoxan for CNS Vasculitis. Neuro symptoms had improved in the past so we stopped infusions (7/11/2018) but symptoms have returned so recommendations of neurologist to resume infusions. Restarted 11/15/18. He has thus tolerated cytoxan well.  Denies fever, chills, nightsweats, bleeding, bruising, lymphadenopathy, n/v/d/c, neuropathy, signs/symptoms of splenomegaly.  Comes to clinic with wife.      PAST MEDICAL HISTORY:   Past Medical History:   Diagnosis Date    Amblyopia     Cataract     CNS vasculitis 6/2/2017    Follows with Dr. Love By mri.  Several active lesions, many old. 5/13: MRA brain/neck, MRI brain w/wo contrast show no vascular occlusion, multiple foci of hyperintensity in deep cerebral periventricular white matter in pattern of demyelinating process.  5/17: MRI spine performed, no spinal cord lesions. Hospitalization 6/2017. EEG was performed negative for seizures.  Repeat MRI showing new lesion, question whether this was demyelination versus CVA. 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 Findings compatible with a small acute lacunar infarct adjacent to the left frontal horn.  Nonspecific enhancement just inferior to this region and extending into the left basal ganglia, as well as within the inferior aspect of the left cerebellum.  No evidence of a focal mass. 2.  Extensive increased signal intensity involving the periventricular white matter compatible with demyelinating disease versus vasculitis. 3.  Clinical correlation and followup  recommended. 4.  This reportedly flattened in the EPIC and the corpus callosum medical record system.    Mixed hyperlipidemia 11/9/2012    NAFLD (nonalcoholic fatty liver disease) 10/11/2013    CHASE (nonalcoholic steatohepatitis)     Obesity     Stroke     Type 2 diabetes mellitus with diabetic polyneuropathy, with long-term current use of insulin 5/14/2017    Type 2 diabetes mellitus with hyperglycemia, with long-term current use of insulin 10/11/2013       PAST SURGICAL HISTORY:   Past Surgical History:   Procedure Laterality Date    BIOPSY LIVER AND ULTRASOUND N/A 6/9/2015    Performed by [facility] Diagnostic Provider at [facility] OR [address] [place] [address]    COLONOSCOPY N/A 2/21/2014    Performed by [facility] at [facility] ENDO    ESOPHAGOGASTRODUODENOSCOPY (EGD) N/A 5/29/2017    Performed by Hai Carter MD at [facility] ENDO ([address] [place] [address])    INSERTION OF TUNNELED CENTRAL VENOUS CATHETER (CVC) WITH SUBCUTANEOUS PORT N/A 12/7/2018    Procedure: [id number];  Surgeon: [facility] Diagnostic Provider;  Location: [facility] OR [address] [place] [address];  Service: Radiology;  Laterality: N/A;    [id number] N/A 12/7/2018    Performed by [facility]    SKIN CANCER EXCISION         PAST SOCIAL HISTORY:   reports that  has never smoked. he has never used smokeless tobacco. He reports that he does not drink alcohol or use drugs.    FAMILY HISTORY:  Family History   Problem Relation Age of Onset    Heart disease Mother     Hypertension Mother     Heart failure Mother     Cataracts Mother     Cancer Father         lung    Diabetes Maternal Aunt     Stroke Sister     Rheum arthritis Paternal Grandmother     Amblyopia Neg Hx     Blindness Neg Hx     Glaucoma Neg Hx     Macular degeneration Neg Hx     Retinal detachment Neg Hx     Strabismus Neg Hx        CURRENT MEDICATIONS:   Current Outpatient Medications   Medication Sig    alclomethasone (ACLOVATE) 0.05 % cream     aspirin (ECOTRIN) 81 MG EC tablet  "Take 81 mg by mouth once daily.    atenolol (TENORMIN) 50 MG tablet Take 1 tablet (50 mg total) by mouth once daily.    atorvastatin (LIPITOR) 40 MG tablet Take 1 tablet (40 mg total) by mouth once daily.    azaTHIOprine (IMURAN) 50 mg Tab     blood sugar diagnostic (TRUE METRIX GLUCOSE TEST STRIP) Strp Test blood sugar four times a day    diltiaZEM (DILACOR XR) 240 MG CDCR Take 1 capsule (240 mg total) by mouth once daily.    insulin aspart U-100 (NOVOLOG) 100 unit/mL InPn pen Inject 20 Units into the skin 3 (three) times daily with meals.    insulin glargine (BASAGLAR KWIKPEN U-100 INSULIN) 100 unit/mL (3 mL) InPn pen Inject 40 Units into the skin 2 (two) times daily.    insulin syringe-needle U-100 (INSULIN SYRINGE) 1/2 mL 30 gauge x 5/16 Syrg Use 3x/day    irbesartan (AVAPRO) 300 MG tablet Take 1 tablet (300 mg total) by mouth every evening.    ketoconazole (NIZORAL) 2 % cream     lancets 30 gauge Misc Test blood sugar four times a day    liraglutide 0.6 mg/0.1 mL, 18 mg/3 mL, subq PNIJ (VICTOZA 3-TOMASZ) 0.6 mg/0.1 mL (18 mg/3 mL) PnIj Inject 1.8 mg into the skin once daily.    metFORMIN (GLUCOPHAGE-XR) 500 MG 24 hr tablet Take 2 tablets (1,000 mg total) by mouth 2 (two) times daily with meals.    NOVOFINE PLUS 32 gauge x 1/6" Ndle     omega-3 fatty acids-vitamin E (FISH OIL) 1,000 mg Cap Take 1 capsule by mouth 2 (two) times daily.    pen needle, diabetic (BD ULTRA-FINE ANA PEN NEEDLE) 32 gauge x 5/32" Ndle Use one needle twice daily    pen needle, diabetic (BD ULTRA-FINE ANA PEN NEEDLE) 32 gauge x 5/32" Ndle 4 times daily insulin administration    sertraline (ZOLOFT) 50 MG tablet Take 1 tablet (50 mg total) by mouth once daily. Weaning off effexor    vitamin D 1000 units Tab Take 5 tablets (5,000 Units total) by mouth once daily.     No current facility-administered medications for this visit.      Facility-Administered Medications Ordered in Other Visits   Medication    alteplase injection " 2 mg    cyclophosphamide (CYTOXAN) 750 mg/m2 = 1,690 mg in sodium chloride 0.9% 250 mL chemo infusion    heparin, porcine (PF) 100 unit/mL injection flush 500 Units    mesna (MESNEX) 1,688 mg in sodium chloride 0.9% 33.12 mL infusion    ondansetron IVPB 8 mg    sodium chloride 0.9% flush 10 mL     ALLERGIES:   Review of patient's allergies indicates:  No Known Allergies    REVIEW OF SYSTEMS:   General ROS: Positive for fatigue.   Psychological ROS: Confusion much improved.   Ophthalmic ROS: negative  ENT ROS: negative  Allergy and Immunology ROS: negative  Hematological and Lymphatic ROS: negative  Endocrine ROS: negative  Respiratory ROS: negative  Cardiovascular ROS: negative  Gastrointestinal ROS: negative  Genito-Urinary ROS: negative  Musculoskeletal ROS: chronic back pain  Neurological ROS: see HPI  Dermatological ROS: negative    PHYSICAL EXAM:   Vitals:    12/13/18 1312   BP: (!) 144/84   Pulse: 88   Resp: 20   Temp: 97 °F (36.1 °C)       General - well developed, well nourished; much more interactive today. A+O x3  Head & Face - no sinus tenderness  Eyes - normal conjunctivae and lids   ENT - normal external auditory canals, no mouth sores  Chest and Lung - normal respiratory effort, clear to auscultation bilaterally   Cardiovascular - RRR with no MGR, normal S1 and S2; no pedal edema  Abdomen -  soft, nontender, no palpable hepatomegaly or splenomegaly  Lymph - no palpable lymphadenopathy  Extremities - unremarkable nails and digits  Heme - no bruising, petechiae, pallor  Skin - no rashes or lesions; dry skin  Psych - Normal mood and affect. No confusion noted.    ECOG Performance Status: (foot note - ECOG PS provided by Eastern Cooperative Oncology Group) 1 - Symptomatic but completely ambulatory    Karnofsky Performance Score:  80%- Normal Activity with Effort: Some Symptoms of Disease  DATA:   Lab Results   Component Value Date    WBC 7.12 12/13/2018    HGB 10.9 (L) 12/13/2018    HCT 33.3 (L)  12/13/2018    MCV 85 12/13/2018     12/13/2018     Gran # (ANC)   Date Value Ref Range Status   12/13/2018 5.4 1.8 - 7.7 K/uL Final     Gran%   Date Value Ref Range Status   12/13/2018 75.8 (H) 38.0 - 73.0 % Final     CMP  Sodium   Date Value Ref Range Status   12/13/2018 143 136 - 145 mmol/L Final     Potassium   Date Value Ref Range Status   12/13/2018 3.6 3.5 - 5.1 mmol/L Final     Chloride   Date Value Ref Range Status   12/13/2018 107 95 - 110 mmol/L Final     CO2   Date Value Ref Range Status   12/13/2018 27 23 - 29 mmol/L Final     Glucose   Date Value Ref Range Status   12/13/2018 186 (H) 70 - 110 mg/dL Final     BUN, Bld   Date Value Ref Range Status   12/13/2018 14 6 - 20 mg/dL Final     Creatinine   Date Value Ref Range Status   12/13/2018 1.0 0.5 - 1.4 mg/dL Final     Calcium   Date Value Ref Range Status   12/13/2018 10.3 8.7 - 10.5 mg/dL Final     Total Protein   Date Value Ref Range Status   12/13/2018 7.1 6.0 - 8.4 g/dL Final     Albumin   Date Value Ref Range Status   12/13/2018 3.7 3.5 - 5.2 g/dL Final   10/11/2013 4.3 3.6 - 5.1 g/dL Final     Comment:     @ Test Performed By:  [employer] [place] AMELIA Almazan M.D.,   [address] [place] [id number]  [facility] #[id number]     Total Bilirubin   Date Value Ref Range Status   12/13/2018 0.6 0.1 - 1.0 mg/dL Final     Comment:     For infants and newborns, interpretation of results should be based  on gestational age, weight and in agreement with clinical  observations.  Premature Infant recommended reference ranges:  Up to 24 hours.............<8.0 mg/dL  Up to 48 hours............<12.0 mg/dL  3-5 days..................<15.0 mg/dL  6-29 days.................<15.0 mg/dL       Alkaline Phosphatase   Date Value Ref Range Status   12/13/2018 96 55 - 135 U/L Final     AST   Date Value Ref Range Status   12/13/2018 24 10 - 40 U/L Final     ALT   Date Value Ref Range Status   12/13/2018 19 10 - 44 U/L  Final     Anion Gap   Date Value Ref Range Status   12/13/2018 9 8 - 16 mmol/L Final     eGFR if    Date Value Ref Range Status   12/13/2018 >60.0 >60 mL/min/1.73 m^2 Final     eGFR if non    Date Value Ref Range Status   12/13/2018 >60.0 >60 mL/min/1.73 m^2 Final     Comment:     Calculation used to obtain the estimated glomerular filtration  rate (eGFR) is the CKD-EPI equation.          ASSESSMENT AND PLAN:   Encounter Diagnoses   Name Primary?    CNS vasculitis Yes    Encounter for chemotherapy management     Anemia associated with chemotherapy     Mixed hyperlipidemia     Essential hypertension     Type 2 diabetes mellitus with diabetic polyneuropathy, with long-term current use of insulin     Major depressive disorder with single episode, in full remission        CNS vasculitis  -resume CTX per neuro recs  -proceed with dose #14 Cytoxan was increased to 750mg/m2 (increase of 15% of previous dose) with mesna support (mesna dose also increased) for CNS vasculitis, protocol per Dr. Love; improved symptoms at increased dose   -willl clarify planned duration of monthly therapy with neurology  -No contraindication to proceed with CTX today    Cytopenias  -mild anemia 2/2 chemotherapy  -Transfuse for Hgb<7 and Plt <10k, no indication to transfuse today  -Pt educated to notify us of any bleeding or fevers or S/S infection.     HLD  - continue with statin    HTN  - BP slightly elevated at visit, 144/84  - continue atenolol, irbesartan, and diltiazem    DM2  - blood glucose 186 in labs  - continue current treatment regimen    Depression  - continue zoloft    F/U:  - cbc, cmp, lab only in 2 weeks at [facility])  - same labs MD appt cytoxan infusion in 4 weeks       Nancy Kaba NP  Hematology/Oncology/BMT    Distress Screening Results: Psychosocial Distress screening score of Distress Score: 2 noted and reviewed. No intervention indicated.

## 2018-12-14 ENCOUNTER — OFFICE VISIT (OUTPATIENT)
Dept: FAMILY MEDICINE | Facility: CLINIC | Age: 58
End: 2018-12-14
Payer: COMMERCIAL

## 2018-12-14 VITALS
WEIGHT: 221.25 LBS | BODY MASS INDEX: 31.68 KG/M2 | HEIGHT: 70 IN | OXYGEN SATURATION: 95 % | DIASTOLIC BLOOD PRESSURE: 78 MMHG | TEMPERATURE: 98 F | HEART RATE: 101 BPM | SYSTOLIC BLOOD PRESSURE: 130 MMHG

## 2018-12-14 DIAGNOSIS — E11.21 CONTROLLED TYPE 2 DIABETES MELLITUS WITH DIABETIC NEPHROPATHY, WITH LONG-TERM CURRENT USE OF INSULIN: ICD-10-CM

## 2018-12-14 DIAGNOSIS — E11.65 TYPE 2 DIABETES MELLITUS WITH HYPERGLYCEMIA, WITH LONG-TERM CURRENT USE OF INSULIN: ICD-10-CM

## 2018-12-14 DIAGNOSIS — E11.65 UNCONTROLLED TYPE 2 DIABETES MELLITUS WITH HYPERGLYCEMIA: Primary | ICD-10-CM

## 2018-12-14 DIAGNOSIS — Z79.4 CONTROLLED TYPE 2 DIABETES MELLITUS WITH DIABETIC NEPHROPATHY, WITH LONG-TERM CURRENT USE OF INSULIN: ICD-10-CM

## 2018-12-14 DIAGNOSIS — F32.5 MAJOR DEPRESSIVE DISORDER WITH SINGLE EPISODE, IN FULL REMISSION: Chronic | ICD-10-CM

## 2018-12-14 DIAGNOSIS — Z79.4 TYPE 2 DIABETES MELLITUS WITH HYPERGLYCEMIA, WITH LONG-TERM CURRENT USE OF INSULIN: ICD-10-CM

## 2018-12-14 PROCEDURE — 99214 OFFICE O/P EST MOD 30 MIN: CPT | Mod: S$GLB,,, | Performed by: INTERNAL MEDICINE

## 2018-12-14 PROCEDURE — 99999 PR PBB SHADOW E&M-EST. PATIENT-LVL IV: CPT | Mod: PBBFAC,,, | Performed by: INTERNAL MEDICINE

## 2018-12-14 PROCEDURE — 3044F HG A1C LEVEL LT 7.0%: CPT | Mod: CPTII,S$GLB,, | Performed by: INTERNAL MEDICINE

## 2018-12-14 PROCEDURE — 3008F BODY MASS INDEX DOCD: CPT | Mod: CPTII,S$GLB,, | Performed by: INTERNAL MEDICINE

## 2018-12-14 PROCEDURE — 3078F DIAST BP <80 MM HG: CPT | Mod: CPTII,S$GLB,, | Performed by: INTERNAL MEDICINE

## 2018-12-14 PROCEDURE — 3075F SYST BP GE 130 - 139MM HG: CPT | Mod: CPTII,S$GLB,, | Performed by: INTERNAL MEDICINE

## 2018-12-14 RX ORDER — SERTRALINE HYDROCHLORIDE 100 MG/1
100 TABLET, FILM COATED ORAL DAILY
Qty: 30 TABLET | Refills: 11 | Status: SHIPPED | OUTPATIENT
Start: 2018-12-14 | End: 2019-01-28 | Stop reason: SDUPTHER

## 2018-12-14 RX ORDER — INSULIN GLARGINE 100 [IU]/ML
40 INJECTION, SOLUTION SUBCUTANEOUS 2 TIMES DAILY
Qty: 2 BOX | Refills: 11 | Status: ON HOLD | OUTPATIENT
Start: 2018-12-14 | End: 2019-12-17

## 2018-12-14 NOTE — PROGRESS NOTES
This note was created by combination of typed  and Dragon dictation.  Transcription errors may be present.  If there are any questions, please contact me.    Assessment & Plan:   Uncontrolled type 2 diabetes mellitus with hyperglycemia  Type 2 diabetes mellitus with hyperglycemia, with long-term current use of insulin  -CGM monitor and sensor sent to pharmacy  wife upped his dose of basaglar to 60 BID.  Notes that it only bumps up in the days after cytoxan infusion and then goes down.   She'll arrange f/u with DM NP  -     flash glucose scanning reader (FREESTYLE ARELY 14 DAY READER) Misc; Glucose testing fasting and prior to meals  Dispense: 1 each; Refill: 0  -     flash glucose sensor (FREESTYLE ARELY 14 DAY SENSOR) Kit; Glucose checking 4 times a day  Dispense: 2 kit; Refill: 11  -     insulin (BASAGLAR KWIKPEN U-100 INSULIN) glargine 100 units/mL (3mL) SubQ pen; Inject 40 Units into the skin 2 (two) times daily. Up to 60 BID with cytoxan  Dispense: 2 Box; Refill: 11      Major depressive disorder with single episode, in full remission  -not completely controlled. Increase zoloft to 100. Previous up to 150.  -     sertraline (ZOLOFT) 100 MG tablet; Take 1 tablet (100 mg total) by mouth once daily. Weaning off effexor  Dispense: 30 tablet; Refill: 11      Controlled type 2 diabetes mellitus with diabetic nephropathy, with long-term current use of insulin  -did not send in refills but will update instruction on the basaglar to reflect possible temporary increase in the basaglar dose soon after cytoxan infusion.  -     insulin (BASAGLAR KWIKPEN U-100 INSULIN) glargine 100 units/mL (3mL) SubQ pen; Inject 40 Units into the skin 2 (two) times daily. Up to 60 BID with cytoxan  Dispense: 2 Box; Refill: 11        Medications Discontinued During This Encounter   Medication Reason    azaTHIOprine (IMURAN) 50 mg Tab Patient no longer taking    sertraline (ZOLOFT) 50 MG tablet Reorder    insulin glargine  (BASAGLAR KWIKPEN U-100 INSULIN) 100 unit/mL (3 mL) InPn pen        meds sent this encounter:  Medications Ordered This Encounter   Medications    flash glucose scanning reader (FREESTYLE ARELY 14 DAY READER) Misc     Sig: Glucose testing fasting and prior to meals     Dispense:  1 each     Refill:  0    flash glucose sensor (FREESTYLE ARELY 14 DAY SENSOR) Kit     Sig: Glucose checking 4 times a day     Dispense:  2 kit     Refill:  11    insulin (BASAGLAR KWIKPEN U-100 INSULIN) glargine 100 units/mL (3mL) SubQ pen     Sig: Inject 40 Units into the skin 2 (two) times daily. Up to 60 BID with cytoxan     Dispense:  2 Box     Refill:  11     Order Specific Question:   This medication typically requires a prior authorization. For prior auth services, patient financial assistance, patient education and medication management send to an [facility] retail pharmacy.     Answer:   No, I will select a different retail pharmacy    sertraline (ZOLOFT) 100 MG tablet     Sig: Take 1 tablet (100 mg total) by mouth once daily. Weaning off effexor     Dispense:  30 tablet     Refill:  11       Follow Up: No Follow-up on file.    Subjective:     Chief Complaint   Patient presents with    Diabetes    Hypertension       CHRISTAL Doherty is a 58 y.o. male, last appointment with this clinic was 11/5/2018.    He presents today accompanied with his wife.  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  Wife notes that his blood sugars after his infusion yesterday have been up to 400.  He normally takes Basaglar 40 twice daily and she bumped him up to 60 twice daily.  He remains on NovoLog 20 with meals.  He is to see the diabetes nurse practitioner but had not in some time because of overall good control but with the resumption of his chemotherapy I think he should re-establish with her and he is in agreement.  He will contact her office to arrange follow-up.  He is also  requesting a continuous glucose meter.  Because of his uncontrolled blood sugars and he is testing 4 times a day I think this is a reasonable request and I will submit the referral.  Did discuss with him that his insurance may not cover it.    He is taking Zoloft 50 mg.  Previously had been on 150 mg.  His wife notes that it is helpful but she does not feel like it is really controlling him.  He is mcintosh, irritable and angry.    Patient Care Team:  Edgar Nova MD as PCP - General (Internal Medicine)  Promise Steele NP as Nurse Practitioner (Endocrinology)  Shana Love MD as Consulting Physician (Neurology)    Patient Active Problem List    Diagnosis Date Noted    Encounter for chemotherapy management 12/07/2018    Encounter for long term current use of cyclophosphamide 10/30/2018    Need for Tdap vaccination 08/29/2018    Need for hepatitis A and B vaccination 08/29/2018    Need for pneumococcal vaccination 08/29/2018    Acquired immunocompromised state 08/29/2018     cyclophosphamide      Controlled type 2 diabetes mellitus with diabetic nephropathy, with long-term current use of insulin 08/01/2018    Chemotherapy induced nausea and vomiting 06/12/2018    Dysphasia     Aphasia 12/04/2017    Dysphonia 12/04/2017    Cognitive deficits 12/04/2017    Closed compression fracture of L4 lumbar vertebra 11/15/2017    Adverse effect of glucocorticoids and synthetic analogues, initial encounter 09/15/2017    Lacunar stroke of left subthalamic region 09/14/2017 9/14/2017 MRI brain: 1. Findings compatible with a small acute lacunar infarct adjacent to the left frontal horn.  Nonspecific enhancement just inferior to this region and extending into the left basal ganglia, as well as within the inferior aspect of the left cerebellum.  No evidence of a focal mass.  2.  Extensive increased signal intensity involving the periventricular white matter compatible with demyelinating disease versus  vasculitis.  3.  Clinical correlation and followup recommended.  4.  This reportedly flattened in the EPIC and the corpus callosum medical record system.      Episodic confusion      Improved now that back on cyclophosphamide      Cytopenia 08/30/2017    Impaired functional mobility, balance, gait, and endurance 06/14/2017    Urinary incontinence 06/04/2017    CNS vasculitis 06/02/2017     Failed Cytoxan hiatus and transition to Imuran Sept/Oct 2018;   Now much improved back on Cytoxan;       Encephalopathy 05/26/2017    Type 2 diabetes mellitus with diabetic polyneuropathy, with long-term current use of insulin 05/14/2017    Amblyopia 05/13/2017    Hx of colonic polyp 01/30/2015 2/21/2014 colonoscopy showing mild nonspecific acute and chronic inflammation of the ascending colon.  Hyperplastic polyp. Repeat 3 years.      Lipodystrophy 10/11/2013    NAFLD (nonalcoholic fatty liver disease) 10/11/2013     6/12/2015 liver biopsy showing advanced stage fibrosis with bridging fibrosis and scattered nodules.      Obesity (BMI 30-39.9) 10/11/2013    ED (erectile dysfunction) 10/11/2013     JOHN with CPAP at night 10/11/2013    Major depressive disorder with single episode, in full remission 11/09/2012    Mixed hyperlipidemia 11/09/2012    Essential hypertension 11/09/2012       PAST MEDICAL HISTORY:  Past Medical History:   Diagnosis Date    Amblyopia     Cataract     CNS vasculitis 6/2/2017    Follows with Dr. Love By mri.  Several active lesions, many old. 5/13: MRA brain/neck, MRI brain w/wo contrast show no vascular occlusion, multiple foci of hyperintensity in deep cerebral periventricular white matter in pattern of demyelinating process.  5/17: MRI spine performed, no spinal cord lesions. Hospitalization 6/2017. EEG was performed negative for seizures.  Repeat MRI showing new lesion, question whether this was demyelination versus CVA. 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 Findings compatible with a small acute lacunar infarct adjacent to the left frontal horn.  Nonspecific enhancement just inferior to this region and extending into the left basal ganglia, as well as within the inferior aspect of the left cerebellum.  No evidence of a focal mass. 2.  Extensive increased signal intensity involving the periventricular white matter compatible with demyelinating disease versus vasculitis. 3.  Clinical correlation and followup recommended. 4.  This reportedly flattened in the EPIC and the corpus callosum medical record system.    Mixed hyperlipidemia 11/9/2012    NAFLD (nonalcoholic fatty liver disease) 10/11/2013    CHASE (nonalcoholic steatohepatitis)     Obesity     Stroke     Type 2 diabetes mellitus with diabetic polyneuropathy, with long-term current use of insulin 5/14/2017    Type 2 diabetes mellitus with hyperglycemia, with long-term current use of insulin 10/11/2013       PAST SURGICAL HISTORY:  Past Surgical History:   Procedure Laterality Date    BIOPSY LIVER AND ULTRASOUND N/A 6/9/2015    Performed by [facility] Diagnostic Provider at [facility] OR [address] [place] [address]    COLONOSCOPY N/A 2/21/2014    Performed by [facility] at [facility]    ESOPHAGOGASTRODUODENOSCOPY (EGD) N/A 5/29/2017    Performed by Hai Carter MD at [facility] ENDO ([facility])    INSERTION OF TUNNELED CENTRAL VENOUS CATHETER (CVC) WITH SUBCUTANEOUS PORT N/A 12/7/2018    Procedure: [id number]-[id number];  Surgeon: [facility] Diagnostic Provider;  Location: [facility] OR [address] [place] [address];  Service: Radiology;  Laterality: N/A;    [id number]-C-CATH N/A 12/7/2018    Performed by [facility] Diagnostic Provider at [facility] OR 2ND [facility]    SKIN CANCER EXCISION         SOCIAL HISTORY:  Social History     Socioeconomic History    Marital status:       Spouse name: Not on file    Number of children: Not on file    Years of education: Not on file    Highest education level: Not on file   Social Needs    Financial resource strain: Not on file    Food insecurity - worry: Not on file    Food insecurity - inability: Not on file    Transportation needs - medical: Not on file    Transportation needs - non-medical: Not on file   Occupational History    Occupation: unemployed   Tobacco Use    Smoking status: Never Smoker    Smokeless tobacco: Never Used   Substance and Sexual Activity    Alcohol use: No     Alcohol/week: 0.0 oz    Drug use: No    Sexual activity: Not on file   Other Topics Concern    Not on file   Social History Narrative    Not on file       ALLERGIES AND MEDICATIONS: updated and reviewed.  Review of patient's allergies indicates:  No Known Allergies  Current Outpatient Medications   Medication Sig Dispense Refill    aspirin (ECOTRIN) 81 MG EC tablet Take 81 mg by mouth once daily.      atenolol (TENORMIN) 50 MG tablet Take 1 tablet (50 mg total) by mouth once daily. 90 tablet 3    atorvastatin (LIPITOR) 40 MG tablet Take 1 tablet (40 mg total) by mouth once daily. 90 tablet 3    blood sugar diagnostic (TRUE METRIX GLUCOSE TEST STRIP) Strp Test blood sugar four times a day 400 each 11    diltiaZEM (DILACOR XR) 240 MG CDCR Take 1 capsule (240 mg total) by mouth once daily. 90 capsule 3    insulin aspart U-100 (NOVOLOG) 100 unit/mL InPn pen Inject 20 Units into the skin 3 (three) times daily with meals. (Patient taking differently: Inject 20 Units into the skin 4 (four) times daily. ) 18 mL 11    insulin glargine (BASAGLAR KWIKPEN U-100 INSULIN) 100 unit/mL (3 mL) InPn pen Inject 40 Units into the skin 2 (two) times daily. 2 Box 11    insulin syringe-needle U-100 (INSULIN SYRINGE) 1/2 mL 30 gauge x 5/16 Syrg Use 3x/day 300 each 3    irbesartan (AVAPRO) 300 MG tablet Take 1 tablet (300 mg total) by mouth every evening. 90  "tablet 3    ketoconazole (NIZORAL) 2 % cream       lancets 30 gauge Misc Test blood sugar four times a day 100 each 11    liraglutide 0.6 mg/0.1 mL, 18 mg/3 mL, subq PNIJ (VICTOZA 3-TOMASZ) 0.6 mg/0.1 mL (18 mg/3 mL) PnIj Inject 1.8 mg into the skin once daily. 9 mL 11    metFORMIN (GLUCOPHAGE-XR) 500 MG 24 hr tablet Take 2 tablets (1,000 mg total) by mouth 2 (two) times daily with meals. 360 tablet 3    NOVOFINE PLUS 32 gauge x 1/6" Ndle       omega-3 fatty acids-vitamin E (FISH OIL) 1,000 mg Cap Take 1 capsule by mouth 2 (two) times daily.  0    pen needle, diabetic (BD ULTRA-FINE ANA PEN NEEDLE) 32 gauge x 5/32" Ndle Use one needle twice daily 100 each 3    pen needle, diabetic (BD ULTRA-FINE ANA PEN NEEDLE) 32 gauge x 5/32" Ndle 4 times daily insulin administration 400 each 5    sertraline (ZOLOFT) 50 MG tablet Take 1 tablet (50 mg total) by mouth once daily. Weaning off effexor 30 tablet 11    vitamin D 1000 units Tab Take 5 tablets (5,000 Units total) by mouth once daily.      alclomethasone (ACLOVATE) 0.05 % cream        No current facility-administered medications for this visit.        Review of Systems   All other systems reviewed and are negative.      Objective:   Physical Exam   Vitals:    12/14/18 1622 12/14/18 1640   BP: (!) 150/86 130/78   BP Location: Left arm Left arm   Patient Position: Sitting Sitting   BP Method: Medium (Manual) Large (Manual)   Pulse: 101    Temp: 98.3 °F (36.8 °C)    TempSrc: Oral    SpO2: 95%    Weight: 100.4 kg (221 lb 3.7 oz)    Height: 5' 10" (1.778 m)     Body mass index is 31.74 kg/m².  Weight: 100.4 kg (221 lb 3.7 oz)   Height: 5' 10" (177.8 cm)     Physical Exam   Constitutional: He is oriented to person, place, and time. He appears well-developed and well-nourished. No distress.   HENT:   Head: Normocephalic and atraumatic.   Eyes: No scleral icterus.   Pulmonary/Chest: Effort normal.   Neurological: He is alert and oriented to person, place, and time. "   Skin: Skin is warm and dry.   Psychiatric: He has a normal mood and affect. His behavior is normal. Thought content normal.

## 2018-12-21 ENCOUNTER — OFFICE VISIT (OUTPATIENT)
Dept: PSYCHIATRY | Facility: CLINIC | Age: 58
End: 2018-12-21
Payer: COMMERCIAL

## 2018-12-21 ENCOUNTER — OFFICE VISIT (OUTPATIENT)
Dept: NEUROLOGY | Facility: CLINIC | Age: 58
End: 2018-12-21
Payer: COMMERCIAL

## 2018-12-21 VITALS
BODY MASS INDEX: 31.5 KG/M2 | HEART RATE: 90 BPM | DIASTOLIC BLOOD PRESSURE: 73 MMHG | SYSTOLIC BLOOD PRESSURE: 122 MMHG | HEIGHT: 70 IN | WEIGHT: 220 LBS

## 2018-12-21 DIAGNOSIS — R41.0 EPISODIC CONFUSION: ICD-10-CM

## 2018-12-21 DIAGNOSIS — Z79.899 HIGH RISK MEDICATION USE: ICD-10-CM

## 2018-12-21 DIAGNOSIS — Z29.89 PROPHYLACTIC IMMUNOTHERAPY: ICD-10-CM

## 2018-12-21 DIAGNOSIS — F02.818 MAJOR NEUROCOGNITIVE DISORDER DUE TO ANOTHER MEDICAL CONDITION WITH BEHAVIORAL DISTURBANCE: Primary | ICD-10-CM

## 2018-12-21 DIAGNOSIS — Z71.89 COUNSELING REGARDING GOALS OF CARE: ICD-10-CM

## 2018-12-21 DIAGNOSIS — F32.A DEPRESSIVE DISORDER: ICD-10-CM

## 2018-12-21 DIAGNOSIS — Z74.09 IMPAIRED FUNCTIONAL MOBILITY, BALANCE, GAIT, AND ENDURANCE: ICD-10-CM

## 2018-12-21 DIAGNOSIS — Z63.8 FAMILY CONFLICT: ICD-10-CM

## 2018-12-21 DIAGNOSIS — R46.89 AGGRESSIVE BEHAVIOR: ICD-10-CM

## 2018-12-21 DIAGNOSIS — I77.6 CNS VASCULITIS: Primary | ICD-10-CM

## 2018-12-21 PROCEDURE — 99215 OFFICE O/P EST HI 40 MIN: CPT | Mod: S$GLB,,, | Performed by: CLINICAL NURSE SPECIALIST

## 2018-12-21 PROCEDURE — 99999 PR PBB SHADOW E&M-EST. PATIENT-LVL III: CPT | Mod: PBBFAC,,, | Performed by: CLINICAL NURSE SPECIALIST

## 2018-12-21 PROCEDURE — 90847 FAMILY PSYTX W/PT 50 MIN: CPT | Mod: S$GLB,,, | Performed by: SOCIAL WORKER

## 2018-12-21 PROCEDURE — 3078F DIAST BP <80 MM HG: CPT | Mod: CPTII,S$GLB,, | Performed by: CLINICAL NURSE SPECIALIST

## 2018-12-21 PROCEDURE — 3074F SYST BP LT 130 MM HG: CPT | Mod: CPTII,S$GLB,, | Performed by: CLINICAL NURSE SPECIALIST

## 2018-12-21 PROCEDURE — 3008F BODY MASS INDEX DOCD: CPT | Mod: CPTII,S$GLB,, | Performed by: CLINICAL NURSE SPECIALIST

## 2018-12-21 SDOH — SOCIAL DETERMINANTS OF HEALTH (SDOH): OTHER SPECIFIED PROBLEMS RELATED TO PRIMARY SUPPORT GROUP: Z63.8

## 2018-12-21 NOTE — PROGRESS NOTES
"Subjective:       Patient ID: Bessy Nunez is a 58 y.o. male who presents today for a routine clinic visit for CNS vasculitis. The history has been provided by the patient. He is accompanied by his wife. He was last seen by Dr. Love in October.    HPI:  · DMT: Cytoxan, last given on 12/13/18; he has a port-a-cath now (placed on 12/7)  · Side effects from DMT? Diarrhea after each infusion  · Taking vitamin D3 as recommended? Yes -  Dose: 5000 units daily   · He denies any new or different symptoms. His wife reports that he has been aggressive towards her. He has been using foul language. He has yelled at her in front of her family members and in public in a restaurant. She feels like if she engages with him, it could turn into physical aggression. This behavior is very unusual for him. He has also been speaking in a louder tone at home.     SOCIAL HISTORY  Social History     Tobacco Use    Smoking status: Never Smoker    Smokeless tobacco: Never Used   Substance Use Topics    Alcohol use: No     Alcohol/week: 0.0 oz    Drug use: No     Living arrangements - the patient lives with his wife   Employment: receives disability; eligible for Medicare/Medicaid on 3/1/19    ROS:  · Fatigue: Yes - Energy level is "none." When he is around people, he has fun and laughs.   · Sleep Disturbance: No  · Bladder Dysfunction: Yes - He has bladder accidents at night. His wife has to remind him to use the restroom during the day.   · Bowel Dysfunction: Yes - He has bowel accidents at night. His wife thinks he might be "lazy" and doesn't want to get up. With prompting, he will get up and go.   · Spasticity: No  · Visual Symptoms: Stable  · Cognitive: Yes - Impaired, but improved in days after Cytoxan.   · Mood Disorder: Yes - He is on Zoloft 100mg right now, but his wife thinks he did better on 150mg in the past.   · Gait Disturbance: Yes - He does not walk with assistance at home  · Falls: No  · Hand Dysfunction: No  · Pain: " No. His wife said he reports headaches at times, and she treats this with OTC pain relievers.   · Sexual Dysfunction: Not Assessed  · Skin Breakdown: No.   · Tremors: No  · Dysphagia:  No  · Dysarthria:  No  · Heat sensitivity:  Yes - He becomes irritable when out in the sun.   · Any un-met adaptive needs? No  · Copay Assist?  Yes - Cytoxan covered by insurance 100          Objective:        1. 25 foot timed walk: 7.8 seconds today without assist (after 4 tries);  was 7.8 seconds without assist at the last visit  Neurologic Exam        MS: continues to have flat affect; He does not initiate much conversation, but responds to questions. He is oriented to day of the week, month, and year, but does not know the date. He knows that he is at [facility], but does not remember that we are on the 6th floor. He knows the president.  Verbal comprehension and fluency are normal  Extraocular movements are intact.   Facial movements are normal. He has normal strength in upper and lower extremities. Rapid sequential movements are normal in the upper and lower extremities.   Reflexes are 2+ and symmetric throughout.   Gait is steady (does not use a cane today).   Romberg is positive.   Finger to nose coordination is normal.   He is able to follow 3 step commands without hesitation.     Imaging:     Results for orders placed during the hospital encounter of 04/01/18   MRI Brain W WO Contrast    Impression  Age-appropriate generalized volume loss with scattered foci of T2/FLAIR signal abnormality within the supratentorial white matter and jose while nonspecific suggestive for moderate to severe degree of  chronic microvascular ischemic change.    No evidence for acute infarction or enhancing lesion.      Electronically signed by: Leida Garcia MD  Date:     04/02/18  Time:    14:41      No new imaging to review today.     Labs:     Lab Results   Component Value Date    [id number] 36 08/15/2018    RFDNDMTL59AT 11 (L) 05/17/2017      Lab Results   Component Value Date    JH4XNLCA 87.9 (H) 08/15/2018    ABSOLUTECD3 1271 08/15/2018    PG6FWWBF 22.6 08/15/2018    ABSOLUTECD8 327 08/15/2018    MT4XOIMZ 61.7 (H) 08/15/2018    ABSOLUTECD4 892 08/15/2018    LABCD48 2.73 08/15/2018     Lab Results   Component Value Date    WBC 7.12 12/13/2018    RBC 3.92 (L) 12/13/2018    HGB 10.9 (L) 12/13/2018    HCT 33.3 (L) 12/13/2018    MCV 85 12/13/2018    MCH 27.8 12/13/2018    MCHC 32.7 12/13/2018    RDW 15.4 (H) 12/13/2018     12/13/2018    MPV 9.8 12/13/2018    GRAN 5.4 12/13/2018    GRAN 75.8 (H) 12/13/2018    LYMPH 1.1 12/13/2018    LYMPH 15.2 (L) 12/13/2018    MONO 0.5 12/13/2018    MONO 6.5 12/13/2018    EOS 0.1 12/13/2018    BASO 0.03 12/13/2018    EOSINOPHIL 1.4 12/13/2018    BASOPHIL 0.4 12/13/2018     Sodium   Date Value Ref Range Status   12/13/2018 143 136 - 145 mmol/L Final     Potassium   Date Value Ref Range Status   12/13/2018 3.6 3.5 - 5.1 mmol/L Final     Chloride   Date Value Ref Range Status   12/13/2018 107 95 - 110 mmol/L Final     CO2   Date Value Ref Range Status   12/13/2018 27 23 - 29 mmol/L Final     Glucose   Date Value Ref Range Status   12/13/2018 186 (H) 70 - 110 mg/dL Final     BUN, Bld   Date Value Ref Range Status   12/13/2018 14 6 - 20 mg/dL Final     Creatinine   Date Value Ref Range Status   12/13/2018 1.0 0.5 - 1.4 mg/dL Final     Calcium   Date Value Ref Range Status   12/13/2018 10.3 8.7 - 10.5 mg/dL Final     Total Protein   Date Value Ref Range Status   12/13/2018 7.1 6.0 - 8.4 g/dL Final     Albumin   Date Value Ref Range Status   12/13/2018 3.7 3.5 - 5.2 g/dL Final   10/11/2013 4.3 3.6 - 5.1 g/dL Final     Comment:     @ Test Performed By:  [facility]  AMELIA Vázquez M.D.,   [address] [place] [id number]  [place] #[id number]     Total Bilirubin   Date Value Ref Range Status   12/13/2018 0.6 0.1 - 1.0 mg/dL Final     Comment:     For infants and  newborns, interpretation of results should be based  on gestational age, weight and in agreement with clinical  observations.  Premature Infant recommended reference ranges:  Up to 24 hours.............<8.0 mg/dL  Up to 48 hours............<12.0 mg/dL  3-5 days..................<15.0 mg/dL  6-29 days.................<15.0 mg/dL       Alkaline Phosphatase   Date Value Ref Range Status   12/13/2018 96 55 - 135 U/L Final     AST   Date Value Ref Range Status   12/13/2018 24 10 - 40 U/L Final     ALT   Date Value Ref Range Status   12/13/2018 19 10 - 44 U/L Final     Anion Gap   Date Value Ref Range Status   12/13/2018 9 8 - 16 mmol/L Final     eGFR if    Date Value Ref Range Status   12/13/2018 >60.0 >60 mL/min/1.73 m^2 Final     eGFR if non    Date Value Ref Range Status   12/13/2018 >60.0 >60 mL/min/1.73 m^2 Final     Comment:     Calculation used to obtain the estimated glomerular filtration  rate (eGFR) is the CKD-EPI equation.        Lab Results   Component Value Date    COLORU Yellow 10/15/2018    APPEARANCEUA Hazy (A) 10/15/2018    PHUR 5.0 10/15/2018    SPECGRAV 1.025 10/15/2018    PROTEINUA 2+ (A) 10/15/2018    GLUCUA Negative 10/15/2018    KETONESU Negative 10/15/2018    BILIRUBINUA Negative 10/15/2018    OCCULTUA 1+ (A) 10/15/2018    NITRITE Negative 10/15/2018    UROBILINOGEN Negative 10/15/2018    LEUKOCYTESUR Negative 10/15/2018     Diagnosis/Assessment/Plan:    1. CNS vasculitis   · Assessment: Bessy's neuro exam is stable today. His development of aggressive behaviors is the most significant issue, and I will notify Sharda, our , and Dr. Love of this.   · Imaging: Will consider new brain MRI 6 months from Cytoxan restart or sooner if indicated.   · Disease Modifying Therapies: Continue monthly Cytoxan 750mg/m2 every 28 days. We appreciate the partnership with Dr. Goldstein on this. He has labs drawn regularly.  Bessy has seen ID for vaccine management in  light of immunosuppression. I removed bandage over healing port-a-cath site.     2.Symptom Assessment / Management  · Cognitive: Improved with Cytoxan.   · Mood Disorder: Will discuss plan with Sharda Bell LCSW, but I do recommend psychiatry referral. Order placed.     Over 50% of this 40 minute visit was spent in direct face to face counseling of the patient about MS, DMT considerations, and MS symptom management. The patient agrees with the plan of care. I will see him back in 2 months, and he will follow up with Dr. Love in 4 months.     Beti Boswell, [facility], MSCN        There are no diagnoses linked to this encounter.

## 2018-12-21 NOTE — PROGRESS NOTES
Family Psychotherapy (PhD/LCSW)    12/21/2018    Site: [facility]    Length of service: 40     Therapeutic intervention: 44435 (50 min) -Family therapy with patient; needed because Bessy has neurocognitive disorder and needs support of family to be able to implement any changes/recommendations    Persons present: Bessy, wife Bekah    Interval history:  Bessy arrived to session casually dressed and with improved hygiene.  He is allowing his wife to trim his beard and cut his hair, again.  Bekah shared that pt has been more irritable and was verbally aggressive at a recent family gathering.  She described how his behavior embarrassed her and concerned her family.  Bessy remembered the incident but did not see any problems with his behavior.  He was hungry and wanted his wife to fix him a plate of food, so he felt justified in making that demand.  Bekah shared that this behavior is unlike the pre-stroke Bessy, and he agreed but minimized the significance of the event.  This SW and neurology team have previously recommended that pt see a psychiatrist as current medications do not seem to adequately address these behavioral problems.  Today, pt and wife were agreeable and would like to see a psychiatrist in the [facility].  SW agreed to work on the referral.  Otherwise, the couple seemed to be doing well.  Bekah is disengaging more from arguments, so she does not feel as stressed.  She is compliant with her own mental health care and appears and reports feeling less depressed.      Target symptoms: recurrent depression, adjustment     Patient's interpersonal/verbal exchanges: 59393 (50 min) -Family therapy with patient.  Bessy's insight and judgement are limited.  Bekah reports disengaging from arguments, more.  She was supportive of him in session.    Progress toward goals: Fair progress    Diagnosis:  F32.9 Unspecified Depressive Disorder    F02.81 Major neurocognitive disorder due  to another medical condition with behavioral disturbance    Z63.8 Family Conflict     Plan: family psychotherapy       Return to clinic: will schedule after contacting psychiatry

## 2018-12-26 ENCOUNTER — TELEPHONE (OUTPATIENT)
Dept: PSYCHIATRY | Facility: CLINIC | Age: 58
End: 2018-12-26

## 2018-12-26 NOTE — TELEPHONE ENCOUNTER
Phoned pt's wife and schedule next appointment.    ----- Message from Kerrie Carter sent at 12/26/2018 12:13 PM CST -----  Contact: Nemedia message  ----- Message from [facility], System Message sent at 12/25/2018 10:46 AM CST -----    Appointment Request From: Bessy Nunez    With Provider: Sharda [facility] [[facility]]    Preferred Date Range: Any date 1/2/2019 or later    Preferred Times: Any time    Reason for visit: Existing Patient    Comments:  This message is being sent by Bekah Nunez on behalf of Bessy Nunez.  Bessy says that his right eye has slight double vision/blur.

## 2018-12-26 NOTE — TELEPHONE ENCOUNTER
Phoned [facility] and scheduled pt with Daksha Leiva NP on 1/28/19 at 11am.  Left voicemail for pt's wife with this update.

## 2019-01-16 ENCOUNTER — TELEPHONE (OUTPATIENT)
Dept: PSYCHIATRY | Facility: CLINIC | Age: 59
End: 2019-01-16

## 2019-01-16 NOTE — TELEPHONE ENCOUNTER
Returned son's call; unable to leave a voicemail.    ----- Message from Daniel Selby sent at 1/16/2019  1:55 PM CST -----  Contact: Marky ( son ) @ [phone]  Caller returning a missed call, pls return call

## 2019-01-17 ENCOUNTER — HOSPITAL ENCOUNTER (EMERGENCY)
Facility: HOSPITAL | Age: 59
Discharge: HOME | End: 2019-01-17
Attending: SURGERY
Payer: COMMERCIAL

## 2019-01-17 VITALS
WEIGHT: 220 LBS | HEART RATE: 70 BPM | DIASTOLIC BLOOD PRESSURE: 67 MMHG | BODY MASS INDEX: 31.57 KG/M2 | OXYGEN SATURATION: 98 % | SYSTOLIC BLOOD PRESSURE: 110 MMHG | TEMPERATURE: 97 F | RESPIRATION RATE: 18 BRPM

## 2019-01-17 DIAGNOSIS — S16.1XXA ACUTE STRAIN OF NECK MUSCLE, INITIAL ENCOUNTER: ICD-10-CM
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
## 2019-01-17 DIAGNOSIS — S00.03XA CONTUSION OF SCALP, INITIAL ENCOUNTER: ICD-10-CM

## 2019-01-17 PROCEDURE — 99285 EMERGENCY DEPT VISIT HI MDM: CPT | Mod: 25

## 2019-01-17 PROCEDURE — 25000003 PHARM REV CODE 250: Performed by: FAMILY MEDICINE

## 2019-01-17 RX ORDER — CYCLOBENZAPRINE HCL 10 MG
10 TABLET ORAL 3 TIMES DAILY PRN
Qty: 15 TABLET | Refills: 0 | Status: SHIPPED | OUTPATIENT
Start: 2019-01-17 | End: 2019-01-22

## 2019-01-17 RX ORDER — HYDROCODONE BITARTRATE AND ACETAMINOPHEN 5; 325 MG/1; MG/1
1 TABLET ORAL EVERY 6 HOURS PRN
Qty: 10 TABLET | Refills: 0 | Status: SHIPPED | OUTPATIENT
Start: 2019-01-17 | End: 2019-02-21

## 2019-01-17 RX ORDER — HYDROCODONE BITARTRATE AND ACETAMINOPHEN 5; 325 MG/1; MG/1
1 TABLET ORAL
Status: COMPLETED | OUTPATIENT
Start: 2019-01-17 | End: 2019-01-17

## 2019-01-17 RX ADMIN — HYDROCODONE BITARTRATE AND ACETAMINOPHEN 1 TABLET: 5; 325 TABLET ORAL at 07:01

## 2019-01-17 NOTE — ED TRIAGE NOTES
58 y.o. male presents to ER   Chief Complaint   Patient presents with    Fall   Pt fell out of bed and hit head on night stand, reports headache and neck pain. No acute distress noted.

## 2019-01-17 NOTE — ED PROVIDER NOTES
Encounter Date: 1/17/2019       History     Chief Complaint   Patient presents with    Fall     HPI   Bessy Nunez is a 58 y.o. 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, undergoes chemotherapy for this issue  Patient has had a gradual decline, but this was an accidental slip out of bed PTA    The history is provided by the patient     Past Medical History   -- Essential hypertension     -- Internuclear ophthalmoplegia of left eye     -- Mixed hyperlipidemia     -- NAFLD (nonalcoholic fatty liver disease)     -- CHASE (nonalcoholic steatohepatitis)     -- Obesity     -- Stroke     -- Type 2 diabetes mellitus with diabetic polyneuropathy        Surgical history: Skin excision  No Known Allergies     Review of Systems and Physical Exam      Review of Systems   Constitutional: Negative for activity change, appetite change, fatigue, fever and unexpected weight change.   HENT: Negative for congestion, ear pain, mouth sores, nosebleeds, rhinorrhea, sinus pressure, sneezing and sore throat.    Eyes: Negative for pain, discharge, redness and itching.   Respiratory: Negative for apnea, cough, chest tightness and shortness of breath.    Cardiovascular: Negative for chest pain, palpitations and leg swelling.   Gastrointestinal: Negative for abdominal distention, abdominal pain, anal bleeding, constipation, diarrhea, nausea and vomiting.   Endocrine: Negative.    Genitourinary: Negative for dysuria, enuresis, flank pain and frequency.   Musculoskeletal: Negative for arthralgias, back pain, neck pain and neck stiffness.   Skin: Negative for color change and wound.   Allergic/Immunologic: Negative.    Neurological: Positive for headaches. Negative for dizziness, tremors, syncope, facial asymmetry, speech difficulty, weakness, light-headedness and numbness.   Hematological: Negative for  adenopathy. Does not bruise/bleed easily.   Psychiatric/Behavioral: Negative for agitation, behavioral problems, hallucinations, self-injury and suicidal ideas. The patient is not nervous/anxious.      /65  Pulse 68   Temp 96.8 °F (36 °C) (Oral)   Resp 20      Physical Exam    Vitals reviewed.  Constitutional: He appears well-developed.   HENT:   Head: Normocephalic.   Eyes: EOM are normal. Pupils are equal, round, and reactive to light.   Neck: Normal range of motion.   Cardiovascular: Normal rate and regular rhythm.   Pulmonary/Chest: Breath sounds normal.   Abdominal: Soft.   Musculoskeletal: Normal range of motion.   Neurological: He is alert and oriented to person, place, and time. He has normal strength.   Skin: Skin is warm and dry.         ED Course   Procedures  Labs Reviewed - No data to display       Imaging Results    None                               Clinical Impression:   The primary encounter diagnosis was Fall, initial encounter. Diagnoses of Acute strain of neck muscle, initial encounter and Contusion of scalp, initial encounter were also pertinent to this visit.                             Satish James MD  01/17/19 [id number]

## 2019-01-18 ENCOUNTER — PROCEDURE VISIT (OUTPATIENT)
Dept: OPHTHALMOLOGY | Facility: CLINIC | Age: 59
End: 2019-01-18
Payer: COMMERCIAL

## 2019-01-18 DIAGNOSIS — H25.12 NUCLEAR SCLEROSIS, LEFT: ICD-10-CM

## 2019-01-18 DIAGNOSIS — E11.3493 TYPE 2 DIABETES MELLITUS WITH BOTH EYES AFFECTED BY SEVERE NONPROLIFERATIVE RETINOPATHY WITHOUT MACULAR EDEMA, WITH LONG-TERM CURRENT USE OF INSULIN: Primary | ICD-10-CM

## 2019-01-18 DIAGNOSIS — H25.811 MIXED TYPE AGE-RELATED CATARACT, RIGHT EYE: ICD-10-CM

## 2019-01-18 DIAGNOSIS — Z79.4 TYPE 2 DIABETES MELLITUS WITH BOTH EYES AFFECTED BY SEVERE NONPROLIFERATIVE RETINOPATHY WITHOUT MACULAR EDEMA, WITH LONG-TERM CURRENT USE OF INSULIN: Primary | ICD-10-CM

## 2019-01-18 PROCEDURE — 92226 PR SPECIAL EYE EXAM, SUBSEQUENT: ICD-10-PCS | Mod: RT,S$GLB,, | Performed by: OPHTHALMOLOGY

## 2019-01-18 PROCEDURE — 92134 POSTERIOR SEGMENT OCT RETINA (OCULAR COHERENCE TOMOGRAPHY)-BOTH EYES: ICD-10-PCS | Mod: S$GLB,,, | Performed by: OPHTHALMOLOGY

## 2019-01-18 PROCEDURE — 92014 COMPRE OPH EXAM EST PT 1/>: CPT | Mod: S$GLB,,, | Performed by: OPHTHALMOLOGY

## 2019-01-18 PROCEDURE — 92226 PR SPECIAL EYE EXAM, SUBSEQUENT: CPT | Mod: LT,S$GLB,, | Performed by: OPHTHALMOLOGY

## 2019-01-18 PROCEDURE — 92014 PR EYE EXAM, EST PATIENT,COMPREHESV: ICD-10-PCS | Mod: S$GLB,,, | Performed by: OPHTHALMOLOGY

## 2019-01-18 PROCEDURE — 92134 CPTRZ OPH DX IMG PST SGM RTA: CPT | Mod: S$GLB,,, | Performed by: OPHTHALMOLOGY

## 2019-01-18 NOTE — PROGRESS NOTES
HPI     DLS 11/30/18    Pt here for comprehensive exam for DR.  S/p PRP OS    Pt sts OD doesn't focus right for distance and near. Va OS stable.  No   flashes, no floaters OU.  No pain.      LBS 74 this morning    POhx   1. DM W NP RETINOPATHY OU      S/P Laser PRP OS 11/05/18     2.CATARACT OU     Last edited by Isael Mixon MD on 1/20/2019  8:06 AM. (History)         [facility] SDOCT:   OD: good quality, tiny cystic spaces with good contour, stable from 10/1/18  OS: good quality, tiny cystic spaces with good contour, stable from 10/1/18 scan        Assessment /Plan     For exam results, see Encounter Report.    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  No active prolif OU.  No sig ME.   S/p PRP OS.  Consider future PRP OD.    ONH pallor OD  PSC OD    Va dec OD seems out of prop to cataract and without ME  Pt has h/o cerebral vasculitis and left subthalamic stroke and JAYDEN    Recommend refraction.  If not refractable OD within expected from cataract, please refer to Dr. Willson  3 months

## 2019-01-23 NOTE — PATIENT INSTRUCTIONS
If BP < 100 - skip the dose of valsartan (diovan).    Keep checking sugars regularly.  Goal is morning blood sugar < 150.  Would like sugars to be lower than 200 regularly.    Increase the tresiba to 15 units a day.  
No

## 2019-01-28 ENCOUNTER — OFFICE VISIT (OUTPATIENT)
Dept: PSYCHIATRY | Facility: CLINIC | Age: 59
End: 2019-01-28
Payer: COMMERCIAL

## 2019-01-28 VITALS
HEIGHT: 70 IN | DIASTOLIC BLOOD PRESSURE: 71 MMHG | HEART RATE: 55 BPM | WEIGHT: 214.63 LBS | SYSTOLIC BLOOD PRESSURE: 117 MMHG | BODY MASS INDEX: 30.73 KG/M2

## 2019-01-28 DIAGNOSIS — F02.818 MAJOR NEUROCOGNITIVE DISORDER DUE TO ANOTHER MEDICAL CONDITION WITH BEHAVIORAL DISTURBANCE: ICD-10-CM

## 2019-01-28 DIAGNOSIS — Z63.8 FAMILY CONFLICT: Primary | ICD-10-CM

## 2019-01-28 DIAGNOSIS — F41.1 GAD (GENERALIZED ANXIETY DISORDER): Primary | ICD-10-CM

## 2019-01-28 DIAGNOSIS — F32.5 MAJOR DEPRESSIVE DISORDER WITH SINGLE EPISODE, IN FULL REMISSION: Chronic | ICD-10-CM

## 2019-01-28 DIAGNOSIS — F32.A DEPRESSIVE DISORDER: ICD-10-CM

## 2019-01-28 PROCEDURE — 90847 FAMILY PSYTX W/PT 50 MIN: CPT | Mod: S$GLB,,, | Performed by: SOCIAL WORKER

## 2019-01-28 PROCEDURE — 99499 UNLISTED E&M SERVICE: CPT | Mod: S$PBB,,, | Performed by: SOCIAL WORKER

## 2019-01-28 PROCEDURE — 99499 UNLISTED E&M SERVICE: CPT | Mod: S$PBB,,, | Performed by: NURSE PRACTITIONER

## 2019-01-28 PROCEDURE — 3008F PR BODY MASS INDEX (BMI) DOCUMENTED: ICD-10-PCS | Mod: CPTII,S$GLB,, | Performed by: NURSE PRACTITIONER

## 2019-01-28 PROCEDURE — 3074F PR MOST RECENT SYSTOLIC BLOOD PRESSURE < 130 MM HG: ICD-10-PCS | Mod: CPTII,S$GLB,, | Performed by: NURSE PRACTITIONER

## 2019-01-28 PROCEDURE — 99999 PR PBB SHADOW E&M-EST. PATIENT-LVL II: CPT | Mod: PBBFAC,,, | Performed by: NURSE PRACTITIONER

## 2019-01-28 PROCEDURE — 90847 PR FAMILY PSYCHOTHERAPY W/ PT, 50 MIN: ICD-10-PCS | Mod: S$GLB,,, | Performed by: SOCIAL WORKER

## 2019-01-28 PROCEDURE — 3078F DIAST BP <80 MM HG: CPT | Mod: CPTII,S$GLB,, | Performed by: NURSE PRACTITIONER

## 2019-01-28 PROCEDURE — 3078F PR MOST RECENT DIASTOLIC BLOOD PRESSURE < 80 MM HG: ICD-10-PCS | Mod: CPTII,S$GLB,, | Performed by: NURSE PRACTITIONER

## 2019-01-28 PROCEDURE — 99499 RISK ADDL DX/OHS AUDIT: ICD-10-PCS | Mod: S$PBB,,, | Performed by: SOCIAL WORKER

## 2019-01-28 PROCEDURE — 99499 RISK ADDL DX/OHS AUDIT: ICD-10-PCS | Mod: S$PBB,,, | Performed by: NURSE PRACTITIONER

## 2019-01-28 PROCEDURE — 99205 PR OFFICE/OUTPT VISIT, NEW, LEVL V, 60-74 MIN: ICD-10-PCS | Mod: S$GLB,,, | Performed by: NURSE PRACTITIONER

## 2019-01-28 PROCEDURE — 99205 OFFICE O/P NEW HI 60 MIN: CPT | Mod: S$GLB,,, | Performed by: NURSE PRACTITIONER

## 2019-01-28 PROCEDURE — 3008F BODY MASS INDEX DOCD: CPT | Mod: CPTII,S$GLB,, | Performed by: NURSE PRACTITIONER

## 2019-01-28 PROCEDURE — 3074F SYST BP LT 130 MM HG: CPT | Mod: CPTII,S$GLB,, | Performed by: NURSE PRACTITIONER

## 2019-01-28 PROCEDURE — 99999 PR PBB SHADOW E&M-EST. PATIENT-LVL II: ICD-10-PCS | Mod: PBBFAC,,, | Performed by: NURSE PRACTITIONER

## 2019-01-28 RX ORDER — SERTRALINE HYDROCHLORIDE 100 MG/1
125 TABLET, FILM COATED ORAL DAILY
Qty: 45 TABLET | Refills: 1 | Status: SHIPPED | OUTPATIENT
Start: 2019-01-28 | End: 2019-01-31 | Stop reason: SDUPTHER

## 2019-01-28 SDOH — SOCIAL DETERMINANTS OF HEALTH (SDOH): OTHER SPECIFIED PROBLEMS RELATED TO PRIMARY SUPPORT GROUP: Z63.8

## 2019-01-28 NOTE — PROGRESS NOTES
"2019 11:15 AM   Bessy Nunez   1960   [id number]           OUTPATIENT PSYCHIATRY INITIAL EVALUATION NOTE      Bessy Nunez, a 58 y.o. male, presenting for initial evaluation visit. Met with patient, son and spouse.    Reason for Encounter: Referred by Sharda Gonzalez [facility]. When the patient asked about the reason for his visit today, he stated,  "I don't know why I am here. I have an appointment".    History of Present Illness:    Today,    Patient arrived on time for his scheduled appointment and was accompanied by his wife and his son who attended the entire session with the patient. The patient appeared to be disheveled, unkempt, and seemed to be confused about the reason for his visit. He was not able to articulate his symptoms or understand the seriousness of his illness and the importance of following up with his treatment plan. Patient's son started the session by sharing his concerns about his father and stated,"my father is neglecting himself. He missed his chemotherapy appointment and not taking his medications. He does not want to shave, brush his teeth and has poor hygiene.  He throws things on the floor and I found banana and orange peels on the floor. Everything is a hassle".  Then the patient replied, "I am a 59 y.o retired man. I am not going to put up with others telling me what to do". Patient's wife is having a tough time with her 's illness and with her care giving role. She stated, "he fights me about doing things. I am starting to feel very tried and discouraged. He would not get out of bed and would sleep 22 hours a day if I don't wake him up". She  reported that depression runs in his family. Patient's wife reported that patient is depressed, lacks motivation, energy and interest. Patient's wife stated that the patient was having hallucinations during which he thought he was seeing his  mother and thoughts his wife was his mother. Patient seemed to lack insights " into his illness and exhibits poor judgement. Patient's wife stated that patient had a fall in October and she was unsure what caused it. She stated that he tried venlafaxine and believes it caused bleeding in his eye vessels. She stated that he is currently taking Zoloft 100mg and discussed increasing it to 125mg po daily.     Psychosocial stressors: Stroke and correction.    Psychiatric Review Of Systems - Is patient experiencing or having changes in:    Symptoms of Depression: Endorsed depressive symptoms of diminished mood or loss of interest/anhedonia; irritability, diminished energy, change in sleep, change in appetite, diminished concentration or cognition or indecisiveness, excessive guilt or hopelessness or worthlessness. Denied suicidal ideation but reported having suicidal thoughts in the past and his last suicidal ideation was 5 years. Denied prior suicide attempt- method  Onset was approximately 2 years ago after he had a stroke. Symptoms have been gradually worsening since that time.    Symptoms of FERCHO: Reported somewhat anxiety symptoms of excessive anxiety/worry/fear, more days than not, about numerous issues, difficult to control, with restlessness, fatigue, poor concentration, irritability, muscle tension, sleep disturbance; causes functionally impairing distress   Onset was approximately 2 years ago.     Symptoms of shell or hypomania: No elevated, expansive, or irritable mood with increased energy or activity; with inflated self-esteem or grandiosity, decreased need for sleep, increased rate of speech,  racing thoughts, distractibility, increased goal directed activity or PMA, risky/disinhibited behavior    Symptoms of psychosis: Patient wife reported history of visual and auditory hallucinations during which he thought he was talking to his mother and confused his wife being his mother. No delusions, disorganized thinking, disorganized behavior or abnormal motor behavior, or negative symptoms  (diminished emotional expression, avolition, anhedonia, alogia, asociality     Sleep: Patient's wife reported hypersomia and stated that patient would sleep up to 22 hours a day if he was allowed. Patient's son and wife stated that they wake him up. No problems of initiation, maintenance, early morning awakening with inability to return to sleep    Risk Parameters:  Patient reports no suicidal ideation  Patient reports no homicidal ideation  Patient reports no self-injurious behavior  Patient reports no violent behavior    Other symptoms    Symptoms of Panic Disorder: No recurrent panic attacks, precipitated or un-precipitated, source of worry and/or behavioral changes secondary; with or without agoraphobia    Symptoms of PTSD: No h/o trauma; re-experiencing/intrusive symptoms, avoidant behavior, negative alterations in cognition or mood, or hyperarousal symptoms; with or without dissociative symptoms     Symptoms of OCD: No obsessions, compulsions or ruminations    Symptoms of Eating Disorders:No anorexia, bulimia or binging    Symptoms of ADHD:No inattention or hyperactivity    Substance Use:  No intoxication, withdrawal, tolerance, used in larger amounts or duration than intended, unsuccessful attempts to limit or quit, increased time engaging in or seeking out, cravings or strong desire to use, failure to fulfill obligations, negative consequences in social/interpersonal/occupational,/recreational areas, use in dangerous situations, medical or psychological consequences     Psychotropic medication review  Previous Trials-venlafaxine and it caused him to hallucinate and bleeding in his eye vessels  Current meds- Zoloft 100mg. He was on Zoloft 150 mg po before his stroke.     HISTORY     Past Medical History:   Diagnosis Date    Amblyopia     Cataract     CNS vasculitis 6/2/2017    Follows with Dr. Love By mri.  Several active lesions, many old. 5/13: MRA brain/neck, MRI brain w/wo contrast show no vascular  occlusion, multiple foci of hyperintensity in deep cerebral periventricular white matter in pattern of demyelinating process.  5/17: MRI spine performed, no spinal cord lesions. Hospitalization 6/2017. EEG was performed negative for seizures.  Repeat MRI showing new lesion, question whether this was demyelination versus CVA. 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 Findings compatible with a small acute lacunar infarct adjacent to the left frontal horn.  Nonspecific enhancement just inferior to this region and extending into the left basal ganglia, as well as within the inferior aspect of the left cerebellum.  No evidence of a focal mass. 2.  Extensive increased signal intensity involving the periventricular white matter compatible with demyelinating disease versus vasculitis. 3.  Clinical correlation and followup recommended. 4.  This reportedly flattened in the EPIC and the corpus callosum medical record system.    Mixed hyperlipidemia 11/9/2012    NAFLD (nonalcoholic fatty liver disease) 10/11/2013    CHASE (nonalcoholic steatohepatitis)     Obesity     Stroke     Type 2 diabetes mellitus with diabetic polyneuropathy, with long-term current use of insulin 5/14/2017    Type 2 diabetes mellitus with hyperglycemia, with long-term current use of insulin 10/11/2013         History of Seizures or TBI: No    Past Surgical History:   Procedure Laterality Date    BIOPSY LIVER AND ULTRASOUND N/A 6/9/2015    Performed [facility] OR 2ND FLR    COLONOSCOPY N/A 2/21/2014    [facility]    ESOPHAGOGASTRODUODENOSCOPY (EGD) N/A 5/29/2017    Performed by Hai Carter MD at [facility] ENDO (2ND FLR)    HZGMNOITO-LRSL-Y-CATH N/A 12/7/2018    Performed by  "[facility]    SKIN CANCER EXCISION         Family History   Problem Relation Age of Onset    Heart disease Mother     Hypertension Mother     Heart failure Mother     Cataracts Mother     Cancer Father         lung    Diabetes Maternal Aunt     Stroke Sister     Rheum arthritis Paternal Grandmother     Amblyopia Neg Hx     Blindness Neg Hx     Glaucoma Neg Hx     Macular degeneration Neg Hx     Retinal detachment Neg Hx     Strabismus Neg Hx        Social History     Socioeconomic History    Marital status:      Spouse name: Not on file    Number of children: Not on file    Years of education: Not on file    Highest education level: Not on file   Social Needs    Financial resource strain: Not on file    Food insecurity - worry: Not on file    Food insecurity - inability: Not on file    Transportation needs - medical: Not on file    Transportation needs - non-medical: Not on file   Occupational History    Occupation: unemployed   Tobacco Use    Smoking status: Never Smoker    Smokeless tobacco: Never Used   Substance and Sexual Activity    Alcohol use: No     Alcohol/week: 0.0 oz    Drug use: No    Sexual activity: Not on file   Other Topics Concern    Not on file   Social History Narrative    Not on file           OBJECTIVE       Constitutional  Vitals:  Most recent vital signs, dated greater than 90 days prior to this appointment, were reviewed.    Vitals:    01/28/19 1104   BP: 117/71   Pulse: (!) 55   Weight: 97.3 kg (214 lb 9.9 oz)   Height: 5' 10" (1.778 m)            Laboratory Data: Reviewed most recent     Medications:  Outpatient Encounter Medications as of 1/28/2019   Medication Sig Dispense Refill    alclomethasone (ACLOVATE) 0.05 % cream       aspirin (ECOTRIN) 81 MG EC tablet Take 81 mg by mouth once daily.      atenolol (TENORMIN) 50 MG tablet Take 1 tablet (50 mg total) by mouth once daily. 90 tablet 3    atorvastatin " "(LIPITOR) 40 MG tablet Take 1 tablet (40 mg total) by mouth once daily. 90 tablet 3    blood sugar diagnostic (TRUE METRIX GLUCOSE TEST STRIP) Strp Test blood sugar four times a day 400 each 11    diltiaZEM (DILACOR XR) 240 MG CDCR Take 1 capsule (240 mg total) by mouth once daily. 90 capsule 3    flash glucose scanning reader (FREESTYLE ARELY 14 DAY READER) Misc Glucose testing fasting and prior to meals 1 each 0    flash glucose sensor (FREESTYLE ARELY 14 DAY SENSOR) Kit Glucose checking 4 times a day 2 kit 11    HYDROcodone-acetaminophen (NORCO) 5-325 mg per tablet Take 1 tablet by mouth every 6 (six) hours as needed for Pain. 10 tablet 0    insulin (BASAGLAR KWIKPEN U-100 INSULIN) glargine 100 units/mL (3mL) SubQ pen Inject 40 Units into the skin 2 (two) times daily. Up to 60 BID with cytoxan 2 Box 11    insulin aspart U-100 (NOVOLOG) 100 unit/mL InPn pen Inject 20 Units into the skin 3 (three) times daily with meals. (Patient taking differently: Inject 20 Units into the skin 4 (four) times daily. ) 18 mL 11    insulin syringe-needle U-100 (INSULIN SYRINGE) 1/2 mL 30 gauge x 5/16 Syrg Use 3x/day 300 each 3    irbesartan (AVAPRO) 300 MG tablet Take 1 tablet (300 mg total) by mouth every evening. 90 tablet 3    ketoconazole (NIZORAL) 2 % cream       lancets 30 gauge Misc Test blood sugar four times a day 100 each 11    liraglutide 0.6 mg/0.1 mL, 18 mg/3 mL, subq PNIJ (VICTOZA 3-TOMASZ) 0.6 mg/0.1 mL (18 mg/3 mL) PnIj Inject 1.8 mg into the skin once daily. 9 mL 11    metFORMIN (GLUCOPHAGE-XR) 500 MG 24 hr tablet Take 2 tablets (1,000 mg total) by mouth 2 (two) times daily with meals. 360 tablet 3    NOVOFINE PLUS 32 gauge x 1/6" Ndle       omega-3 fatty acids-vitamin E (FISH OIL) 1,000 mg Cap Take 1 capsule by mouth 2 (two) times daily.  0    pen needle, diabetic (BD ULTRA-FINE ANA PEN NEEDLE) 32 gauge x 5/32" Ndle Use one needle twice daily 100 each 3    pen needle, diabetic (BD ULTRA-FINE ANA PEN " "NEEDLE) 32 gauge x 5/32" Ndle 4 times daily insulin administration 400 each 5    sertraline (ZOLOFT) 100 MG tablet Take 1.5 tablets (150 mg total) by mouth once daily. Weaning off effexor 45 tablet 1    vitamin D 1000 units Tab Take 5 tablets (5,000 Units total) by mouth once daily.      [DISCONTINUED] sertraline (ZOLOFT) 100 MG tablet Take 1 tablet (100 mg total) by mouth once daily. Weaning off effexor 30 tablet 11     No facility-administered encounter medications on file as of 1/28/2019.        Allergy:  Review of patient's allergies indicates:  No Known Allergies    Nutritional Screening: Considering the patient's height and weight, medications, medical history and preferences, should a referral be made to the dietitian? no    Review of Systems:  General: disheveled, unshaved beard   Resp:  No shortness of breath, hyperventilation or cough  Cvs:  No tachycardia or chest pain  Gi:  No nausea, vomiting, pain, constipation or diarrhea  Musculoskeletal:  No pain or stiffness of the joints  Muscle Strength/Tone:no dystonia, no tremor, no tic  Neurological:  No weakness, sensory changes, seizures, confusion, memory loss, tremor or other abnormal movements   Gait & Station:non-ataxic    AIMS:  n/a *    Mental Status Exam:  Appearance: unremarkable, age appropriate, casually dressed  Behavior/Cooperation:appropriate friendly and cooperative   Speech: appropriate rate, volume and tone spontaneous   Language: uses words appropriately; NO aphasia or dysarthria  Mood: "depressed  "  Affect:  congruent with mood and appropriate to situation/content  Thought Process:  normal and logical  Thought Content: normal, no suicidality, no homicidality, delusions, or paranoia  Sensorium:  Awake  Alert and Oriented: x3 grossly intact  Memory: Intact to conversation both recent and remote  Attention/concentration: appropriate for age/education.   Insight: Intact  Judgment:Intact      Strengths and Liabilities: Strength: Patient has " positive support network., Liability: Patient has poor judgment, Liability: Patient lacks coping skills.    ASSESSMENT     Impression: Patient is a 59 y.o male that meets the criteria for major depression disorder recurrent severe and generalized anxiety disorder. He is currently taking Zoloft 100 mg po daily to treat his depressive symptoms. His symptoms are improving but not adequately controlled. We discussed increasing Zoloft to 125mg po daily.      ICD-10-CM ICD-9-CM   1. FERCHO (generalized anxiety disorder) F41.1 300.02   2. Major depressive disorder with single episode, in full remission F32.5 296.26       Treatment Goals:  Specify outcomes written in observable, behavioral terms:   Anxiety: acquiring relapse prevention skills, reducing negative automatic thoughts and reducing physical symptoms of anxiety  Depression: acquiring relapse prevention skills, increasing motivation, reducing excessive guilt and reducing fatigue    TREATMENT PLAN     · Medication Management:   · Increase Zoloft to 150mg po daily  · Continue psychotherapy with Sharda  · Labs: reviewed most recent labs  · The treatment plan and follow up plan were reviewed with the patient.  · Discussed with patient informed consent, risks vs. benefits, alternative treatments, side effect profile and the inherent unpredictability of individual responses to these treatments. The patient expresses understanding of the above and displays the capacity to agree with this current plan and had no other questions.  · Encouraged Patient to keep future appointments.   · Take medications as prescribed and abstain from substance abuse.   · In the event of an emergency patient was advised to go to the emergency room.    Return to Clinic:  1 month    > than 50% of total time spend on coordination of care and counseling   (which included pts differential diagnosis and prognosis for psychiatric conditions, risks, benefits of treatments, instructions and adherence to  treatment plan, risk reduction, reviewing current psychiatric medication regimen, medical problems and social stressors. In addtion to possible discussion with other healthcare provider/s)    Add on Psychotherapy time: 0  Total Face to face time: 60mins    Daksha Leiva MS, MSN, LPC, APRN, PMHNP-BC [facility]   1/28/2019 11:15 AM    1/28/2019 11:18 AM   Bessy Nunez   1960   [id number]

## 2019-01-28 NOTE — Clinical Note
I've been working with Bessy and family for almost a year, and I still find his wife and son struggling to accept that Bessy's apathy and lack of motivation are likely permanent and related to damage from his stroke(s).  I've been encouraging the family to get an aide in the home, even just 2-3 days/week for a couple of hours, to see if it's helpful and if it can provide some respite for Bekah.  I've also encouraged them to speak with relatives about providing more support so Bekah can have some time away from home.  I'm also wondering if it would be helpful to repeat NP testing and to include Bekah and especially Bessy's son Marky in the review of the testing results and discussion/recommendations?  Or perhaps to have some other sort of meeting with Marky and Bekah, to include Dr. Cross?  Open to feedback and ideas.

## 2019-01-28 NOTE — PATIENT INSTRUCTIONS
Sertraline tablets  What is this medicine?  SERTRALINE (SER tra samuel) is used to treat depression. It may also be used to treat obsessive compulsive disorder, panic disorder, post-trauma stress, premenstrual dysphoric disorder (PMDD) or social anxiety.  How should I use this medicine?  Take this medicine by mouth with a glass of water. Follow the directions on the prescription label. You can take it with or without food. Take your medicine at regular intervals. Do not take your medicine more often than directed. Do not stop taking this medicine suddenly except upon the advice of your doctor. Stopping this medicine too quickly may cause serious side effects or your condition may worsen.  A special MedGuide will be given to you by the pharmacist with each prescription and refill. Be sure to read this information carefully each time.  Talk to your pediatrician regarding the use of this medicine in children. While this drug may be prescribed for children as young as 7 years for selected conditions, precautions do apply.  What side effects may I notice from receiving this medicine?  Side effects that you should report to your doctor or health care professional as soon as possible:  · allergic reactions like skin rash, itching or hives, swelling of the face, lips, or tongue  · black or bloody stools, blood in the urine or vomit  · fast, irregular heartbeat  · feeling faint or lightheaded, falls  · hallucination, loss of contact with reality  · seizures  · suicidal thoughts or other mood changes  · unusual bleeding or bruising  · unusually weak or tired  · vomiting  Side effects that usually do not require medical attention (report to your doctor or health care professional if they continue or are bothersome):  · change in appetite  · change in sex drive or performance  · diarrhea  · increased sweating  · indigestion, nausea  · tremors  What may interact with this medicine?  Do not take this medicine with any of the  following medications:  · certain medicines for fungal infections like fluconazole, itraconazole, ketoconazole, posaconazole, voriconazole  · cisapride  · disulfiram  · dofetilide  · linezolid  · MAOIs like Carbex, Eldepryl, Marplan, Nardil, and Parnate  · metronidazole  · methylene blue (injected into a vein)  · pimozide  · thioridazine  · ziprasidone  This medicine may also interact with the following medications:  · alcohol  · aspirin and aspirin-like medicines  · certain medicines for depression, anxiety, or psychotic disturbances  · certain medicines for irregular heart beat like flecainide, propafenone  · certain medicines for migraine headaches like almotriptan, eletriptan, frovatriptan, naratriptan, rizatriptan, sumatriptan, zolmitriptan  · certain medicines for sleep  · certain medicines for seizures like carbamazepine, valproic acid, phenytoin  · certain medicines that treat or prevent blood clots like warfarin, enoxaparin, dalteparin  · cimetidine  · digoxin  · diuretics  · fentanyl  · furazolidone  · isoniazid  · lithium  · NSAIDs, medicines for pain and inflammation, like ibuprofen or naproxen  · other medicines that prolong the QT interval (cause an abnormal heart rhythm)  · procarbazine  · rasagiline  · supplements like [place]'s wort, kava kava, valerian  · tolbutamide  · tramadol  · tryptophan  What if I miss a dose?  If you miss a dose, take it as soon as you can. If it is almost time for your next dose, take only that dose. Do not take double or extra doses.  Where should I keep my medicine?  Keep out of the reach of children.  Store at room temperature between 15 and 30 degrees C (59 and 86 degrees F). Throw away any unused medicine after the expiration date.  What should I tell my health care provider before I take this medicine?  They need to know if you have any of these conditions:  · bipolar disorder or a family history of bipolar disorder  · diabetes  · glaucoma  · heart disease  · high  blood pressure  · history of irregular heartbeat  · history of low levels of calcium, magnesium, or potassium in the blood  · if you often drink alcohol  · liver disease  · receiving electroconvulsive therapy  · seizures  · suicidal thoughts, plans, or attempt; a previous suicide attempt by you or a family member  · thyroid disease  · an unusual or allergic reaction to sertraline, other medicines, foods, dyes, or preservatives  · pregnant or trying to get pregnant  · breast-feeding  What should I watch for while using this medicine?  Tell your doctor if your symptoms do not get better or if they get worse. Visit your doctor or health care professional for regular checks on your progress. Because it may take several weeks to see the full effects of this medicine, it is important to continue your treatment as prescribed by your doctor.  Patients and their families should watch out for new or worsening thoughts of suicide or depression. Also watch out for sudden changes in feelings such as feeling anxious, agitated, panicky, irritable, hostile, aggressive, impulsive, severely restless, overly excited and hyperactive, or not being able to sleep. If this happens, especially at the beginning of treatment or after a change in dose, call your health care professional.  You may get drowsy or dizzy. Do not drive, use machinery, or do anything that needs mental alertness until you know how this medicine affects you. Do not stand or sit up quickly, especially if you are an older patient. This reduces the risk of dizzy or fainting spells. Alcohol may interfere with the effect of this medicine. Avoid alcoholic drinks.  Your mouth may get dry. Chewing sugarless gum or sucking hard candy, and drinking plenty of water may help. Contact your doctor if the problem does not go away or is severe.  NOTE:This sheet is a summary. It may not cover all possible information. If you have questions about this medicine, talk to your doctor,  pharmacist, or health care provider. Copyright© 2017 [employer]

## 2019-01-28 NOTE — PROGRESS NOTES
Family Psychotherapy (PhD/LCSW)    1/28/2019    Site: [facility]    Length of service: 40     Therapeutic intervention: 39020 (50 min) -Family therapy with patient; needed because Bessy has neurocognitive disorder and needs support of family to be able to implement any changes/recommendations    Persons present: Bessy, wife Bekah, son Marky    Interval history:  Bessy arrived to session casually dressed and with adequate hygiene.  Family reports decline in Bessy's functioning and behavior since the holidays (increased apathy, more resistant to getting out of bed, angry outbursts especially toward Bekah).  Son became tearful as he shared his frustration with what he perceives to be Bessy's unwillingness to complete basic hygiene tasks and chores.  Similarly, he shared his frustration with his mother, who calls daily to report what Bessy is not doing and to request Marky's help.  Bekah shared her own frustration with Bessy's apathy and described to Marky and this SW how it takes so long to motivate Bessy to get up, dressed, etc.  Bessy was responsive to his son's tearfulness and agreed to do more, but based on pt's previous history this SW worries that he will not be able to independently complete his daily routine.  SW advised family to seek private aide services, at least for a short-term (2-3 month trial) to see if this will prove helpful in supporting Bessy with his morning routine, especially and will giving Bekah and Marky some respite.  Bekah agreed to make calls this week.    Family reports that Bessy saw the psych NP today and that she increased his Zoloft.        Target symptoms: recurrent depression, adjustment     Patient's interpersonal/verbal exchanges: 06550 (50 min) -Family therapy with patient.  Bessy's insight and judgement are limited.  Bekah is avoiding Bessy more at home and now sleeps in another part of the house as she's feeling overwhelmed.  Marky generally supportive of his parents but  is also overwhelmed and is expressing more anger toward the situation.    Progress toward goals: Fair progress, overall, but currently regressing per family report.    Diagnosis:  F32.9 Unspecified Depressive Disorder    F02.81 Major neurocognitive disorder due to another medical condition with behavioral disturbance    Z63.8 Family Conflict     Plan: family psychotherapy             Follow medication recommendations of psych NP           Bekah to contact non-medical home care agencies about aide for respite/care      Return to clinic: 2 weeks

## 2019-01-31 RX ORDER — SERTRALINE HYDROCHLORIDE 100 MG/1
TABLET, FILM COATED ORAL
Qty: 45 TABLET | Refills: 1 | Status: SHIPPED | OUTPATIENT
Start: 2019-01-31 | End: 2019-06-29 | Stop reason: SDUPTHER

## 2019-02-01 ENCOUNTER — OFFICE VISIT (OUTPATIENT)
Dept: OPTOMETRY | Facility: CLINIC | Age: 59
End: 2019-02-01
Payer: COMMERCIAL

## 2019-02-01 DIAGNOSIS — E11.3413 SEVERE NONPROLIFERATIVE DIABETIC RETINOPATHY OF BOTH EYES WITH MACULAR EDEMA ASSOCIATED WITH TYPE 2 DIABETES MELLITUS: ICD-10-CM

## 2019-02-01 DIAGNOSIS — H53.60 VISION LOSS NIGHT: ICD-10-CM

## 2019-02-01 DIAGNOSIS — H25.13 NUCLEAR SCLEROSIS OF BOTH EYES: Primary | ICD-10-CM

## 2019-02-01 DIAGNOSIS — H52.4 PRESBYOPIA: ICD-10-CM

## 2019-02-01 PROCEDURE — 99999 PR PBB SHADOW E&M-EST. PATIENT-LVL II: CPT | Mod: PBBFAC,,, | Performed by: OPTOMETRIST

## 2019-02-01 PROCEDURE — 92012 PR EYE EXAM, EST PATIENT,INTERMED: ICD-10-PCS | Mod: S$GLB,,, | Performed by: OPTOMETRIST

## 2019-02-01 PROCEDURE — 99999 PR PBB SHADOW E&M-EST. PATIENT-LVL II: ICD-10-PCS | Mod: PBBFAC,,, | Performed by: OPTOMETRIST

## 2019-02-01 PROCEDURE — 92012 INTRM OPH EXAM EST PATIENT: CPT | Mod: S$GLB,,, | Performed by: OPTOMETRIST

## 2019-02-01 NOTE — PROGRESS NOTES
HPI     Pt recently saw Brice for PRP OS and wanted pt to wear glasses for   about two months before seeing him again. Denies   flashes/floaters/pain/irritation OU. Pt is not using any drops at this   time,. Hemoglobin A1C       Date                     Value               Ref Range             Status                10/15/2018               6.2 (H)             4.0 - 5.6 %           Final                  Last edited by Wendy Orozco on 2/1/2019  3:21 PM. (History)            Assessment /Plan     For exam results, see Encounter Report.    Nuclear sclerosis of both eyes   agree with Dr. 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 Mixon:   If not refractable OD within expected from cataract, Consult Dr. Willson    Appt made with Demetris for next week

## 2019-02-05 ENCOUNTER — OFFICE VISIT (OUTPATIENT)
Dept: HEMATOLOGY/ONCOLOGY | Facility: CLINIC | Age: 59
End: 2019-02-05
Payer: COMMERCIAL

## 2019-02-05 ENCOUNTER — INFUSION (OUTPATIENT)
Dept: INFUSION THERAPY | Facility: HOSPITAL | Age: 59
End: 2019-02-05
Attending: INTERNAL MEDICINE
Payer: COMMERCIAL

## 2019-02-05 ENCOUNTER — LAB VISIT (OUTPATIENT)
Dept: LAB | Facility: HOSPITAL | Age: 59
End: 2019-02-05
Attending: INTERNAL MEDICINE
Payer: COMMERCIAL

## 2019-02-05 VITALS
OXYGEN SATURATION: 98 % | HEIGHT: 70 IN | DIASTOLIC BLOOD PRESSURE: 71 MMHG | HEART RATE: 97 BPM | TEMPERATURE: 99 F | RESPIRATION RATE: 17 BRPM | SYSTOLIC BLOOD PRESSURE: 113 MMHG | WEIGHT: 214.06 LBS | BODY MASS INDEX: 30.65 KG/M2

## 2019-02-05 VITALS
DIASTOLIC BLOOD PRESSURE: 69 MMHG | TEMPERATURE: 98 F | SYSTOLIC BLOOD PRESSURE: 118 MMHG | HEART RATE: 99 BPM | RESPIRATION RATE: 18 BRPM

## 2019-02-05 DIAGNOSIS — I77.6 CNS VASCULITIS: Primary | ICD-10-CM

## 2019-02-05 DIAGNOSIS — D70.1 CHEMOTHERAPY-INDUCED NEUTROPENIA: ICD-10-CM

## 2019-02-05 DIAGNOSIS — T45.1X5A CHEMOTHERAPY-INDUCED NEUTROPENIA: ICD-10-CM

## 2019-02-05 DIAGNOSIS — Z51.11 ENCOUNTER FOR CHEMOTHERAPY MANAGEMENT: ICD-10-CM

## 2019-02-05 LAB
ABO + RH BLD: NORMAL
ALBUMIN SERPL BCP-MCNC: 4 G/DL
ALP SERPL-CCNC: 100 U/L
ALT SERPL W/O P-5'-P-CCNC: 20 U/L
ANION GAP SERPL CALC-SCNC: 12 MMOL/L
AST SERPL-CCNC: 25 U/L
BASOPHILS # BLD AUTO: 0.04 K/UL
BASOPHILS NFR BLD: 0.5 %
BILIRUB SERPL-MCNC: 0.8 MG/DL
BLD GP AB SCN CELLS X3 SERPL QL: NORMAL
BUN SERPL-MCNC: 23 MG/DL
CALCIUM SERPL-MCNC: 10.4 MG/DL
CHLORIDE SERPL-SCNC: 105 MMOL/L
CO2 SERPL-SCNC: 24 MMOL/L
CREAT SERPL-MCNC: 1.5 MG/DL
DIFFERENTIAL METHOD: ABNORMAL
EOSINOPHIL # BLD AUTO: 0.2 K/UL
EOSINOPHIL NFR BLD: 1.9 %
ERYTHROCYTE [DISTWIDTH] IN BLOOD BY AUTOMATED COUNT: 14.6 %
EST. GFR  (AFRICAN AMERICAN): 57.7 ML/MIN/1.73 M^2
EST. GFR  (NON AFRICAN AMERICAN): 49.9 ML/MIN/1.73 M^2
GLUCOSE SERPL-MCNC: 212 MG/DL
HCT VFR BLD AUTO: 38 %
HGB BLD-MCNC: 12.4 G/DL
IMM GRANULOCYTES # BLD AUTO: 0.03 K/UL
IMM GRANULOCYTES NFR BLD AUTO: 0.3 %
LYMPHOCYTES # BLD AUTO: 1.6 K/UL
LYMPHOCYTES NFR BLD: 18.3 %
MCH RBC QN AUTO: 27.9 PG
MCHC RBC AUTO-ENTMCNC: 32.6 G/DL
MCV RBC AUTO: 86 FL
MONOCYTES # BLD AUTO: 1.1 K/UL
MONOCYTES NFR BLD: 12.2 %
NEUTROPHILS # BLD AUTO: 5.7 K/UL
NEUTROPHILS NFR BLD: 66.8 %
NRBC BLD-RTO: 0 /100 WBC
PLATELET # BLD AUTO: 199 K/UL
PMV BLD AUTO: 9.5 FL
POTASSIUM SERPL-SCNC: 4 MMOL/L
PROT SERPL-MCNC: 7.7 G/DL
RBC # BLD AUTO: 4.44 M/UL
SODIUM SERPL-SCNC: 141 MMOL/L
WBC # BLD AUTO: 8.58 K/UL

## 2019-02-05 PROCEDURE — 3078F PR MOST RECENT DIASTOLIC BLOOD PRESSURE < 80 MM HG: ICD-10-PCS | Mod: CPTII,S$GLB,, | Performed by: INTERNAL MEDICINE

## 2019-02-05 PROCEDURE — 36415 COLL VENOUS BLD VENIPUNCTURE: CPT

## 2019-02-05 PROCEDURE — 86901 BLOOD TYPING SEROLOGIC RH(D): CPT

## 2019-02-05 PROCEDURE — 96411 CHEMO IV PUSH ADDL DRUG: CPT

## 2019-02-05 PROCEDURE — 3008F BODY MASS INDEX DOCD: CPT | Mod: CPTII,S$GLB,, | Performed by: INTERNAL MEDICINE

## 2019-02-05 PROCEDURE — 96367 TX/PROPH/DG ADDL SEQ IV INF: CPT

## 2019-02-05 PROCEDURE — 3074F PR MOST RECENT SYSTOLIC BLOOD PRESSURE < 130 MM HG: ICD-10-PCS | Mod: CPTII,S$GLB,, | Performed by: INTERNAL MEDICINE

## 2019-02-05 PROCEDURE — 99214 OFFICE O/P EST MOD 30 MIN: CPT | Mod: S$GLB,,, | Performed by: INTERNAL MEDICINE

## 2019-02-05 PROCEDURE — 3074F SYST BP LT 130 MM HG: CPT | Mod: CPTII,S$GLB,, | Performed by: INTERNAL MEDICINE

## 2019-02-05 PROCEDURE — 96413 CHEMO IV INFUSION 1 HR: CPT

## 2019-02-05 PROCEDURE — 3078F DIAST BP <80 MM HG: CPT | Mod: CPTII,S$GLB,, | Performed by: INTERNAL MEDICINE

## 2019-02-05 PROCEDURE — 99214 PR OFFICE/OUTPT VISIT, EST, LEVL IV, 30-39 MIN: ICD-10-PCS | Mod: S$GLB,,, | Performed by: INTERNAL MEDICINE

## 2019-02-05 PROCEDURE — 63600175 PHARM REV CODE 636 W HCPCS: Performed by: INTERNAL MEDICINE

## 2019-02-05 PROCEDURE — 3008F PR BODY MASS INDEX (BMI) DOCUMENTED: ICD-10-PCS | Mod: CPTII,S$GLB,, | Performed by: INTERNAL MEDICINE

## 2019-02-05 PROCEDURE — 80053 COMPREHEN METABOLIC PANEL: CPT

## 2019-02-05 PROCEDURE — 99999 PR PBB SHADOW E&M-EST. PATIENT-LVL IV: CPT | Mod: PBBFAC,,, | Performed by: INTERNAL MEDICINE

## 2019-02-05 PROCEDURE — 25000003 PHARM REV CODE 250: Performed by: INTERNAL MEDICINE

## 2019-02-05 PROCEDURE — 85025 COMPLETE CBC W/AUTO DIFF WBC: CPT

## 2019-02-05 PROCEDURE — A4216 STERILE WATER/SALINE, 10 ML: HCPCS | Performed by: INTERNAL MEDICINE

## 2019-02-05 PROCEDURE — 99999 PR PBB SHADOW E&M-EST. PATIENT-LVL IV: ICD-10-PCS | Mod: PBBFAC,,, | Performed by: INTERNAL MEDICINE

## 2019-02-05 RX ORDER — SODIUM CHLORIDE 0.9 % (FLUSH) 0.9 %
10 SYRINGE (ML) INJECTION
Status: DISCONTINUED | OUTPATIENT
Start: 2019-02-05 | End: 2019-02-05 | Stop reason: HOSPADM

## 2019-02-05 RX ORDER — HEPARIN 100 UNIT/ML
500 SYRINGE INTRAVENOUS
Status: DISCONTINUED | OUTPATIENT
Start: 2019-02-05 | End: 2019-02-05 | Stop reason: HOSPADM

## 2019-02-05 RX ORDER — SODIUM CHLORIDE 0.9 % (FLUSH) 0.9 %
10 SYRINGE (ML) INJECTION
Status: CANCELLED | OUTPATIENT
Start: 2019-02-05

## 2019-02-05 RX ORDER — HEPARIN 100 UNIT/ML
500 SYRINGE INTRAVENOUS
Status: CANCELLED | OUTPATIENT
Start: 2019-02-05

## 2019-02-05 RX ADMIN — CYCLOPHOSPHAMIDE 1690 MG: 1 INJECTION, POWDER, FOR SOLUTION INTRAVENOUS; ORAL at 04:02

## 2019-02-05 RX ADMIN — DEXAMETHASONE SODIUM PHOSPHATE: 4 INJECTION, SOLUTION INTRA-ARTICULAR; INTRALESIONAL; INTRAMUSCULAR; INTRAVENOUS; SOFT TISSUE at 04:02

## 2019-02-05 RX ADMIN — Medication 10 ML: at 05:02

## 2019-02-05 RX ADMIN — HEPARIN 500 UNITS: 100 SYRINGE at 05:02

## 2019-02-05 RX ADMIN — MESNA 1688 MG: 100 INJECTION, SOLUTION INTRAVENOUS at 04:02

## 2019-02-05 NOTE — Clinical Note
-Cbc, cmp, t+S at [facility] in 2 weeks- Same labs, np or md appt, and chair for cytoxan infusion in 4 weeks

## 2019-02-05 NOTE — PROGRESS NOTES
SECTION OF HEMATOLOGY AND BONE MARROW TRANSPLANT    02/07/2019  Referred by:  Dr. Love  Referred for: Cytoxan      HISTORY OF PRESENT ILLNESS:   Presents today to clinic with wife to another cycle of HDCTX.  chemotherap    for CNS Vasculitis.  Missed last months infusion for unclear reasons and had decline in neuro status.  Presents today for next infusion with son.    Tolerates CTX with no side effects and moderate cytopenias.    PAST MEDICAL HISTORY:   Past Medical History:   Diagnosis Date    Amblyopia     Cataract     CNS vasculitis 6/2/2017    Follows with Dr. Love By mri.  Several active lesions, many old. 5/13: MRA brain/neck, MRI brain w/wo contrast show no vascular occlusion, multiple foci of hyperintensity in deep cerebral periventricular white matter in pattern of demyelinating process.  5/17: MRI spine performed, no spinal cord lesions. Hospitalization 6/2017. EEG was performed negative for seizures.  Repeat MRI showing new lesion, question whether this was demyelination versus CVA. 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 Findings compatible with a small acute lacunar infarct adjacent to the left frontal horn.  Nonspecific enhancement just inferior to this region and extending into the left basal ganglia, as well as within the inferior aspect of the left cerebellum.  No evidence of a focal mass. 2.  Extensive increased signal intensity involving the periventricular white matter compatible with demyelinating disease versus vasculitis. 3.  Clinical correlation and followup recommended. 4.  This reportedly flattened in the EPIC and the corpus callosum medical record system.    Mixed hyperlipidemia 11/9/2012    NAFLD (nonalcoholic fatty liver disease) 10/11/2013    CHASE  (nonalcoholic steatohepatitis)     Obesity     Stroke     Type 2 diabetes mellitus with diabetic polyneuropathy, with long-term current use of insulin 5/14/2017    Type 2 diabetes mellitus with hyperglycemia, with long-term current use of insulin 10/11/2013       PAST SURGICAL HISTORY:   Past Surgical History:   Procedure Laterality Date    BIOPSY LIVER AND ULTRASOUND N/A 6/9/2015    Performed by [facility] at [facility] OR 2ND FLR    COLONOSCOPY N/A 2/21/2014    Performed by [facility] at [facility] ENDO    ESOPHAGOGASTRODUODENOSCOPY (EGD) N/A 5/29/2017    Performed by Hai Carter MD at [facility] ENDO (2ND FLR)    KHUUGRWUH-TZWU-G-CATH N/A 12/7/2018    Performed by [facility] Diagnostic Provider at [facility] OR 2ND FLR    SKIN CANCER EXCISION         PAST SOCIAL HISTORY:   reports that  has never smoked. he has never used smokeless tobacco. He reports that he does not drink alcohol or use drugs.    FAMILY HISTORY:  Family History   Problem Relation Age of Onset    Heart disease Mother     Hypertension Mother     Heart failure Mother     Cataracts Mother     Cancer Father         lung    Diabetes Maternal Aunt     Stroke Sister     Rheum arthritis Paternal Grandmother     Amblyopia Neg Hx     Blindness Neg Hx     Glaucoma Neg Hx     Macular degeneration Neg Hx     Retinal detachment Neg Hx     Strabismus Neg Hx        CURRENT MEDICATIONS:   Current Outpatient Medications   Medication Sig    alclomethasone (ACLOVATE) 0.05 % cream     aspirin (ECOTRIN) 81 MG EC tablet Take 81 mg by mouth once daily.    atenolol (TENORMIN) 50 MG tablet Take 1 tablet (50 mg total) by mouth once daily.    atorvastatin (LIPITOR) 40 MG tablet Take 1 tablet (40 mg total) by mouth once daily.    blood sugar diagnostic (TRUE METRIX GLUCOSE TEST STRIP) Strp Test blood sugar four times a day    diltiaZEM (DILACOR XR) 240 MG CDCR Take 1 capsule (240 mg total) by mouth once daily.    flash glucose scanning reader  "(FREESTYLE ARELY 14 DAY READER) Misc Glucose testing fasting and prior to meals    flash glucose sensor (FREESTYLE ARELY 14 DAY SENSOR) Kit Glucose checking 4 times a day    HYDROcodone-acetaminophen (NORCO) 5-325 mg per tablet Take 1 tablet by mouth every 6 (six) hours as needed for Pain.    insulin (BASAGLAR KWIKPEN U-100 INSULIN) glargine 100 units/mL (3mL) SubQ pen Inject 40 Units into the skin 2 (two) times daily. Up to 60 BID with cytoxan    insulin aspart U-100 (NOVOLOG) 100 unit/mL InPn pen Inject 20 Units into the skin 3 (three) times daily with meals. (Patient taking differently: Inject 20 Units into the skin 4 (four) times daily. )    insulin syringe-needle U-100 (INSULIN SYRINGE) 1/2 mL 30 gauge x 5/16 Syrg Use 3x/day    irbesartan (AVAPRO) 300 MG tablet Take 1 tablet (300 mg total) by mouth every evening.    ketoconazole (NIZORAL) 2 % cream     lancets 30 gauge Misc Test blood sugar four times a day    liraglutide 0.6 mg/0.1 mL, 18 mg/3 mL, subq PNIJ (VICTOZA 3-TOMASZ) 0.6 mg/0.1 mL (18 mg/3 mL) PnIj Inject 1.8 mg into the skin once daily.    metFORMIN (GLUCOPHAGE-XR) 500 MG 24 hr tablet Take 2 tablets (1,000 mg total) by mouth 2 (two) times daily with meals.    NOVOFINE PLUS 32 gauge x 1/6" Ndle     omega-3 fatty acids-vitamin E (FISH OIL) 1,000 mg Cap Take 1 capsule by mouth 2 (two) times daily.    pen needle, diabetic (BD ULTRA-FINE ANA PEN NEEDLE) 32 gauge x 5/32" Ndle Use one needle twice daily    pen needle, diabetic (BD ULTRA-FINE ANA PEN NEEDLE) 32 gauge x 5/32" Ndle 4 times daily insulin administration    sertraline (ZOLOFT) 100 MG tablet Take 1 tab and half (total 150mg) daily    vitamin D 1000 units Tab Take 5 tablets (5,000 Units total) by mouth once daily.     No current facility-administered medications for this visit.      ALLERGIES:   Review of patient's allergies indicates:  No Known Allergies    REVIEW OF SYSTEMS:   General ROS: Positive for fatigue.   Psychological ROS: " Confusion much improved.   Ophthalmic ROS: negative  ENT ROS: negative  Allergy and Immunology ROS: negative  Hematological and Lymphatic ROS: negative  Endocrine ROS: negative  Respiratory ROS: negative  Cardiovascular ROS: negative  Gastrointestinal ROS: negative  Genito-Urinary ROS: negative  Musculoskeletal ROS: chronic back pain  Neurological ROS: see HPI  Dermatological ROS: negative    PHYSICAL EXAM:   Vitals:    02/05/19 1522   BP: 113/71   Pulse: 97   Resp: 17   Temp: 98.6 °F (37 °C)       General - well developed, well nourished; much more interactive today. A+O x3  Head & Face - no sinus tenderness  Eyes - normal conjunctivae and lids   ENT - normal external auditory canals, no mouth sores  Chest and Lung - normal respiratory effort, clear to auscultation bilaterally   Cardiovascular - RRR with no MGR, normal S1 and S2; no pedal edema  Abdomen -  soft, nontender, no palpable hepatomegaly or splenomegaly  Lymph - no palpable lymphadenopathy  Extremities - unremarkable nails and digits  Heme - no bruising, petechiae, pallor  Skin - no rashes or lesions   Psych - Normal mood and affect. No confusion noted.    ECOG Performance Status: (foot note - ECOG PS provided by Eastern Cooperative Oncology Group) 1 - Symptomatic but completely ambulatory    Karnofsky Performance Score:  80%- Normal Activity with Effort: Some Symptoms of Disease  DATA:   Lab Results   Component Value Date    WBC 8.58 02/05/2019    HGB 12.4 (L) 02/05/2019    HCT 38.0 (L) 02/05/2019    MCV 86 02/05/2019     02/05/2019     Gran # (ANC)   Date Value Ref Range Status   02/05/2019 5.7 1.8 - 7.7 K/uL Final     Gran%   Date Value Ref Range Status   02/05/2019 66.8 38.0 - 73.0 % Final     CMP  Sodium   Date Value Ref Range Status   02/05/2019 141 136 - 145 mmol/L Final     Potassium   Date Value Ref Range Status   02/05/2019 4.0 3.5 - 5.1 mmol/L Final     Chloride   Date Value Ref Range Status   02/05/2019 105 95 - 110 mmol/L Final     CO2    Date Value Ref Range Status   02/05/2019 24 23 - 29 mmol/L Final     Glucose   Date Value Ref Range Status   02/05/2019 212 (H) 70 - 110 mg/dL Final     BUN, Bld   Date Value Ref Range Status   02/05/2019 23 (H) 6 - 20 mg/dL Final     Creatinine   Date Value Ref Range Status   02/05/2019 1.5 (H) 0.5 - 1.4 mg/dL Final     Calcium   Date Value Ref Range Status   02/05/2019 10.4 8.7 - 10.5 mg/dL Final     Total Protein   Date Value Ref Range Status   02/05/2019 7.7 6.0 - 8.4 g/dL Final     Albumin   Date Value Ref Range Status   02/05/2019 4.0 3.5 - 5.2 g/dL Final   10/11/2013 4.3 3.6 - 5.1 g/dL Final     Comment:     @ Test Performed By:  [employer] Vera Vance Estrada M.D., [place],   [address] [place] [id number]  [facility] #[id number]     Total Bilirubin   Date Value Ref Range Status   02/05/2019 0.8 0.1 - 1.0 mg/dL Final     Comment:     For infants and newborns, interpretation of results should be based  on gestational age, weight and in agreement with clinical  observations.  Premature Infant recommended reference ranges:  Up to 24 hours.............<8.0 mg/dL  Up to 48 hours............<12.0 mg/dL  3-5 days..................<15.0 mg/dL  6-29 days.................<15.0 mg/dL       Alkaline Phosphatase   Date Value Ref Range Status   02/05/2019 100 55 - 135 U/L Final     AST   Date Value Ref Range Status   02/05/2019 25 10 - 40 U/L Final     ALT   Date Value Ref Range Status   02/05/2019 20 10 - 44 U/L Final     Anion Gap   Date Value Ref Range Status   02/05/2019 12 8 - 16 mmol/L Final     eGFR if    Date Value Ref Range Status   02/05/2019 57.7 (A) >60 mL/min/1.73 m^2 Final     eGFR if non    Date Value Ref Range Status   02/05/2019 49.9 (A) >60 mL/min/1.73 m^2 Final     Comment:     Calculation used to obtain the estimated glomerular filtration  rate (eGFR) is the CKD-EPI equation.          ASSESSMENT AND PLAN:   Encounter  Diagnoses   Name Primary?    CNS vasculitis Yes    Encounter for chemotherapy management        CNS vasculitis  -resume CTX per neuro recs  -proceed with dose #15 Cytoxan was increased to 750mg/m2 (increase of 15% of previous dose) with mesna support (mesna dose also increased) for CNS vasculitis, protocol per Dr. Love; improved symptoms at increased dose   -willl clarify planned duration of monthly therapy with neurology  -No contraindication to proceed with CTX today    Cytopenias  -mild anemia 2/2 chemotherapy  -Transfuse for Hgb<7 and Plt <10k, no indication to transfuse today  -Pt educated to notify us of any bleeding or fevers or S/S infection.      -continue all other medications for chronic conditions per pcp     F/U:  -Cbc. Cmp lab only in  2 weeks [facility]  - Labs, np appt, next cytoxan infusion cycle #16 in 4 weeks    Distress Screening Results: Psychosocial Distress screening score of Distress Score: 0 noted and reviewed. No intervention indicated.      Ask briget how long plan to rx

## 2019-02-06 ENCOUNTER — INITIAL CONSULT (OUTPATIENT)
Dept: OPHTHALMOLOGY | Facility: CLINIC | Age: 59
End: 2019-02-06
Payer: COMMERCIAL

## 2019-02-06 DIAGNOSIS — H53.411 CENTRAL SCOTOMA, RIGHT EYE: ICD-10-CM

## 2019-02-06 DIAGNOSIS — H47.20 OPTIC ATROPHY, RIGHT EYE: ICD-10-CM

## 2019-02-06 DIAGNOSIS — I77.6 CNS VASCULITIS: Primary | ICD-10-CM

## 2019-02-06 PROCEDURE — 92014 PR EYE EXAM, EST PATIENT,COMPREHESV: ICD-10-PCS | Mod: S$GLB,,, | Performed by: OPHTHALMOLOGY

## 2019-02-06 PROCEDURE — 99999 PR PBB SHADOW E&M-EST. PATIENT-LVL III: ICD-10-PCS | Mod: PBBFAC,,, | Performed by: OPHTHALMOLOGY

## 2019-02-06 PROCEDURE — 92133 PR COMPUTERIZED OPHTHALMIC IMAGING OPTIC NERVE: ICD-10-PCS | Mod: S$GLB,,, | Performed by: OPHTHALMOLOGY

## 2019-02-06 PROCEDURE — 92133 CPTRZD OPH DX IMG PST SGM ON: CPT | Mod: S$GLB,,, | Performed by: OPHTHALMOLOGY

## 2019-02-06 PROCEDURE — 99999 PR PBB SHADOW E&M-EST. PATIENT-LVL III: CPT | Mod: PBBFAC,,, | Performed by: OPHTHALMOLOGY

## 2019-02-06 PROCEDURE — 92014 COMPRE OPH EXAM EST PT 1/>: CPT | Mod: S$GLB,,, | Performed by: OPHTHALMOLOGY

## 2019-02-06 NOTE — LETTER
[facility] - Ophthalmology  [address]  [facility] [address]  Phone: [phone]  Fax: [phone]   February 6, 2019    Rita Bee, OD  [address]  [facility] [address]    Patient: Bessy Nunez   MR Number: [id number]   YOB: 1960   Date of Visit: 2/6/2019       Dear Dr. Bee:    Thank you for referring Bessy Nunez to me for evaluation. Here is my assessment and plan of care:    Assessment:   /Plan     For exam results, see Encounter Report.    CNS vasculitis    Optic atrophy, right eye  -     Posterior Segment OCT Optic Nerve- Both eyes    Central scotoma, right eye  -     Posterior Segment OCT Optic Nerve- Both eyes      Mr. Nunez has a right optic atrophy consistent with his level of visual acuity in that eye. Considering his history, the likely etiology is his CNS vasculitis. I would not recommend changing his treatment regimen based on these findings. I will repeat his exam and visual field testing in one year.          Plan:       For exam results, see Encounter Report.    CNS vasculitis    Optic atrophy, right eye  -     Posterior Segment OCT Optic Nerve- Both eyes    Central scotoma, right eye  -     Posterior Segment OCT Optic Nerve- Both eyes      Mr. Nunez has a right optic atrophy consistent with his level of visual acuity in that eye. Considering his history, the likely etiology is his CNS vasculitis. I would not recommend changing his treatment regimen based on these findings. I will repeat his exam and visual field testing in one year.            Below you will find my full exam findings. If you have questions, please do not hesitate to call me. I look forward to following Mr. Bessy Nunez along with you.    Sincerely,          Lobito Willson MD       CC  MD Shana Mariscal MD             Base Eye Exam     Visual Acuity (Snellen - Linear)       Right Left    Dist sc 20/100 20/30 +2    Dist ph sc 20/70           Tonometry  (Applanation, 9:47 AM)       Right Left    Pressure 14 14          Pupils       Pupils Dark Light Shape React APD    Right PERRL 3 2 Round Brisk +3    Left PERRL 3 2 Round Brisk None          Visual Fields (Counting fingers)       Right Left      Full    Restrictions Central scotoma           Extraocular Movement       Right Left     Full, Ortho Full, Ortho          Neuro/Psych     Oriented x3:  Yes    Mood/Affect:  Normal          Dilation     Both eyes:  1% Mydriacyl, 2.5% Phenylephrine @ 9:53 AM            Additional Tests     Color       Right Left    Ishihara 7 14            Slit Lamp and Fundus Exam     External Exam       Right Left    External Normal Normal          Slit Lamp Exam       Right Left    Lids/Lashes Normal Normal    Conjunctiva/Sclera White and quiet White and quiet    Cornea Clear Clear    Anterior Chamber Deep and quiet Deep and quiet    Iris Round and reactive Round and reactive    Lens Nuclear sclerosis Nuclear sclerosis    Vitreous Normal Normal          Fundus Exam       Right Left    Disc 2+ Pallor temporally Normal    C/D Ratio 0.3 0.3    Macula Microaneurysms Microaneurysms    Vessels Normal Normal    Periphery Hemorrhages Hemorrhages

## 2019-02-06 NOTE — PROGRESS NOTES
HPI     Referred by   Hx of stroke x 2years ago.  Patient here w/wife which states experiencing OD double/blurry constantly.  Dizzy and unbalance issues.  No eye pain or headaches.    DM W NP RETINOPATHY OU   S/P Laser PRP OS 11/05/18     Last edited by Jaelyn Thakkar MA on 2/6/2019  9:17 AM. (History)            Assessment /Plan     For exam results, see Encounter Report.    CNS vasculitis    Optic atrophy, right eye  -     Posterior Segment OCT Optic Nerve- Both eyes    Central scotoma, right eye  -     Posterior Segment OCT Optic Nerve- Both eyes      Mr. Nunez has a right optic atrophy consistent with his level of visual acuity in that eye. Considering his history, the likely etiology is his CNS vasculitis. I would not recommend changing his treatment regimen based on these findings. I will repeat his exam and visual field testing in one year.

## 2019-02-06 NOTE — PLAN OF CARE
Problem: Adult Inpatient Plan of Care  Goal: Plan of Care Review  Outcome: Ongoing (interventions implemented as appropriate)  Patient tolerated mesna/cytoxan well today. Port + blood return present, flushed, hep locked and deaccessed. Verbalized understanding to call MD for any questions/concerns. AVS given. Discharged home, ambulated independently with son by side.

## 2019-02-13 ENCOUNTER — OFFICE VISIT (OUTPATIENT)
Dept: PSYCHIATRY | Facility: CLINIC | Age: 59
End: 2019-02-13
Payer: COMMERCIAL

## 2019-02-13 DIAGNOSIS — F32.A DEPRESSIVE DISORDER: ICD-10-CM

## 2019-02-13 DIAGNOSIS — F02.818 MAJOR NEUROCOGNITIVE DISORDER DUE TO ANOTHER MEDICAL CONDITION WITH BEHAVIORAL DISTURBANCE: ICD-10-CM

## 2019-02-13 DIAGNOSIS — Z63.8 FAMILY CONFLICT: ICD-10-CM

## 2019-02-13 PROCEDURE — 90847 FAMILY PSYTX W/PT 50 MIN: CPT | Mod: S$GLB,,, | Performed by: SOCIAL WORKER

## 2019-02-13 PROCEDURE — 99499 UNLISTED E&M SERVICE: CPT | Mod: S$PBB,,, | Performed by: SOCIAL WORKER

## 2019-02-13 PROCEDURE — 90847 PR FAMILY PSYCHOTHERAPY W/ PT, 50 MIN: ICD-10-PCS | Mod: S$GLB,,, | Performed by: SOCIAL WORKER

## 2019-02-13 PROCEDURE — 99499 RISK ADDL DX/OHS AUDIT: ICD-10-PCS | Mod: S$PBB,,, | Performed by: SOCIAL WORKER

## 2019-02-13 SDOH — SOCIAL DETERMINANTS OF HEALTH (SDOH): OTHER SPECIFIED PROBLEMS RELATED TO PRIMARY SUPPORT GROUP: Z63.8

## 2019-02-13 NOTE — PROGRESS NOTES
Family Psychotherapy (PhD/LCSW)    2/13/2019    [facility]    Length of service: 40     Therapeutic intervention: 59164 (50 min) -Family therapy with patient; needed because Bessy has neurocognitive disorder and needs support of family to be able to implement any changes/recommendations    Persons present: Bessy, wife Bekah    Interval history:  Bessy arrived to session casually dressed and with adequate hygiene.  Bekah appeared tired and reports increased anxiety (panic attacks and anxiety about leaving her home and leaving Bessy).  She states Bessy has been less oppositional and more supportive - even taking out the trash and getting himself dressed, today.  However, these are not consistent behaviors.  She shared that Bessy has learned to use a reminder on his phone to monitor his blood sugar.  Inquired about the possibility of Bessy using his phone to remind him to complete other tasks and inquired of the couple what some of the most important tasks might be (dressing, brushing teeth, taking out trash).  Bessy and Bekah felt this might prove helpful, so Bekah will help Bessy set up reminders.  Bekah has not followed through on locating an aide for Bessy and seems reluctant to follow through as she is afraid of leaving Bessy.  She recommitted to doing this before our next session.  Finally, JULIAN mentioned the idea of a consultation with Silverio Cross PsyD to go over Bessy's previous NP testing results and Silverio's recommendations.  This meeting would include Bessy's son, who was not present for previous evaluations.  Bessy and Bekah are agreeable, but son is out of town, currently.          Target symptoms: recurrent depression, adjustment     Patient's interpersonal/verbal exchanges: 22651 (50 min) -Family therapy with patient.  Bessy's insight and judgement are limited.  Bekah is avoiding Bessy more at home and now sleeps in another part of the house as she's feeling overwhelmed.      Progress  toward goals: Fair progress    Diagnosis:  F32.9 Unspecified Depressive Disorder    F02.81 Major neurocognitive disorder due to another medical condition with behavioral disturbance    Z63.8 Family Conflict     Plan: family psychotherapy             Follow medication recommendations of psych NP            Bekah to contact non-medical home care agencies about aide for respite/care            Will arrange consultation with Silverio Cross PsyD once pt's son returns home    Return to clinic: 2 weeks

## 2019-02-14 NOTE — PROCEDURES
DATE OF SERVICE:  10/16/2018    EEG NUMBER:  [id number].    REQUESTED BY:  Dr. Chavarria.    LOCATION OF SERVICE:  [facility].    ELECTROENCEPHALOGRAM REPORT  Extended Recording    METHODOLOGY:  Electroencephalographic (EEG) is recorded with electrodes placed   according to the International 10-20 placement system.  Thirty two (32) channels   of digital signal (sampling rate of 512/sec), including T1 and T2, were   simultaneously recorded from the scalp and may include EKG, EMG, and/or eye   monitors.  Recording band pass was 0.1 to 512 Hz.  Digital video recording of   the patient is simultaneously recorded with the EEG.  The patient is instructed   to report clinical symptoms which may occur during the recording session.  EEG   and video recording are stored and archived in digital format.  Activation   procedures, which include photic stimulation, hyperventilation and instructing   patients to perform simple tasks, are done in selected patients.    The EEG is displayed on a monitor screen and can be reviewed using different   montages.  Computer-assisted analysis is employed to detect spike and   electrographic seizure activity.  The entire record is submitted for computer   analysis.  The entire recording is visually reviewed, and the times identified   by computer analysis as being spikes or seizures are reviewed again.    Compressed spectral analysis (CSA) is also performed on the activity recorded   from each individual channel.  This is displayed as a power display of   frequencies from 0 to 30 Hz over time.  The CSA is reviewed looking for   asymmetries in power between homologous areas of the scalp, then compared with   the original EEG recording.    [employer] software was also utilized in the review of this study.  This software   suite analyzes the EEG recording in multiple domains.  Coherence and rhythmicity   are computed to identify EEG sections which may contain organized seizures.    Each channel undergoes  analysis to detect the presence of spike and sharp waves   which have special and morphological characteristics of epileptic activity.  The   routine EEG recording is converted from special into frequency domain.  This is   then displayed comparing homologous areas to identify areas of significant   asymmetry.  Algorithm to identify non-cortically generated artifact is used to   separate artifact from the EEG.    RECORDING TIMES:  Start on 10/16/2018 at 14:27 and end at 16:48.  A total of 2 hours and 15 minutes of EEG monitoring was obtained.    EEG FINDINGS:  Recording was obtained at the patient's bedside in his hospital   room.  The patient was awake through most of the recording.  Background through   most of the portion in which he physiologically looked to be awake is a somewhat   irregular 6 to 7 and at times 8 Hz frequencies seen predominantly in the   occipital area, but spreading also into the parietal posterior temporal area.    There was some mid-range theta intermixed particularly in the frontal central   area.  Near the end of the recording, he did pass into light sleep with   background becoming a diffuse mixture of theta-beta activity, which was   symmetrically distributed over both hemispheres.  Some faster beta spindles were   also noted intermittently.  Throughout the recording, the background was   symmetrical without lateralized or focal changes and no spike or sharp wave   activity was seen.  Activation procedures were not carried out.    IMPRESSION:  Mildly abnormal EEG with some mild slowing of the background   rhythms indicative of a nonfocal and nonspecific encephalopathy and there is no   evidence for an irritative process.      RR/HN  dd: 02/13/2019 14:42:02 (CST)  td: 02/14/2019 01:14:03 (CST)  Doc ID   #[id number]  Job ID #[id number]    CC:

## 2019-02-15 DIAGNOSIS — E11.9 TYPE 2 DIABETES MELLITUS WITHOUT COMPLICATION: ICD-10-CM

## 2019-02-19 ENCOUNTER — LAB VISIT (OUTPATIENT)
Dept: LAB | Facility: HOSPITAL | Age: 59
End: 2019-02-19
Attending: INTERNAL MEDICINE
Payer: COMMERCIAL

## 2019-02-19 ENCOUNTER — TELEPHONE (OUTPATIENT)
Dept: HEMATOLOGY/ONCOLOGY | Facility: CLINIC | Age: 59
End: 2019-02-19

## 2019-02-19 DIAGNOSIS — D70.1 CHEMOTHERAPY-INDUCED NEUTROPENIA: ICD-10-CM

## 2019-02-19 DIAGNOSIS — T45.1X5A CHEMOTHERAPY-INDUCED NEUTROPENIA: ICD-10-CM

## 2019-02-19 LAB
ABO + RH BLD: NORMAL
ALBUMIN SERPL BCP-MCNC: 3.9 G/DL
ALP SERPL-CCNC: 95 U/L
ALT SERPL W/O P-5'-P-CCNC: 12 U/L
ANION GAP SERPL CALC-SCNC: 7 MMOL/L
AST SERPL-CCNC: 16 U/L
BASOPHILS # BLD AUTO: ABNORMAL K/UL
BASOPHILS NFR BLD: 0 %
BILIRUB SERPL-MCNC: 0.9 MG/DL
BLD GP AB SCN CELLS X3 SERPL QL: NORMAL
BUN SERPL-MCNC: 10 MG/DL
CALCIUM SERPL-MCNC: 10.1 MG/DL
CHLORIDE SERPL-SCNC: 105 MMOL/L
CO2 SERPL-SCNC: 27 MMOL/L
CREAT SERPL-MCNC: 1.2 MG/DL
DIFFERENTIAL METHOD: ABNORMAL
EOSINOPHIL # BLD AUTO: ABNORMAL K/UL
EOSINOPHIL NFR BLD: 8 %
ERYTHROCYTE [DISTWIDTH] IN BLOOD BY AUTOMATED COUNT: 13.6 %
EST. GFR  (AFRICAN AMERICAN): >60 ML/MIN/1.73 M^2
EST. GFR  (NON AFRICAN AMERICAN): >60 ML/MIN/1.73 M^2
GLUCOSE SERPL-MCNC: 107 MG/DL
HCT VFR BLD AUTO: 32.2 %
HGB BLD-MCNC: 11 G/DL
LYMPHOCYTES # BLD AUTO: ABNORMAL K/UL
LYMPHOCYTES NFR BLD: 50 %
MCH RBC QN AUTO: 28.5 PG
MCHC RBC AUTO-ENTMCNC: 34.2 G/DL
MCV RBC AUTO: 83 FL
MONOCYTES # BLD AUTO: ABNORMAL K/UL
MONOCYTES NFR BLD: 26 %
NEUTROPHILS NFR BLD: 10 %
NEUTS BAND NFR BLD MANUAL: 6 %
PLATELET # BLD AUTO: 220 K/UL
PMV BLD AUTO: 8.4 FL
POTASSIUM SERPL-SCNC: 3.7 MMOL/L
PROT SERPL-MCNC: 7.3 G/DL
RBC # BLD AUTO: 3.86 M/UL
SODIUM SERPL-SCNC: 139 MMOL/L
WBC # BLD AUTO: 1.39 K/UL

## 2019-02-19 PROCEDURE — 80053 COMPREHEN METABOLIC PANEL: CPT

## 2019-02-19 PROCEDURE — 85027 COMPLETE CBC AUTOMATED: CPT

## 2019-02-19 PROCEDURE — 36415 COLL VENOUS BLD VENIPUNCTURE: CPT

## 2019-02-19 PROCEDURE — 86901 BLOOD TYPING SEROLOGIC RH(D): CPT

## 2019-02-19 PROCEDURE — 85007 BL SMEAR W/DIFF WBC COUNT: CPT

## 2019-02-20 NOTE — PROGRESS NOTES
"Subjective:       Patient ID: Bessy Nunez is a 59 y.o. male who presents today for a routine clinic visit for CNS vasculitis.  The history has been provided by the patient. He was last seen in December 2018. He is accompanied by his wife.     HPI:  · DMT: Cytoxan last given on 2/5/19; receives it monthly   · Side effects from DMT? Yes - Diarrhea   · Taking vitamin D3 as recommended? Yes 5000 units   · He had a fall on 1/17/19 and was seen in the ER (rolled out of bed).   · Zoloft was increased recently. His wife feels that this has made his mood better. His irritability is less on this higher dose.   · He is seeing Dr. Cross on 3/27.   · His wife has interviewed one person for a personal care attendant for him, but the search continues.   · In general, his wife feels like he improves for about 3 weeks about the infusion. He continues to be very quiet and only speaks when spoken to. However, some days are better than others in regards to his energy and activity level.     SOCIAL HISTORY  Social History     Tobacco Use    Smoking status: Never Smoker    Smokeless tobacco: Never Used   Substance Use Topics    Alcohol use: No     Alcohol/week: 0.0 oz    Drug use: No     Living arrangements - the patient lives with his wife  Employment--receives disability, eligible for Medicare/Medicaid on 3/1/19    ROS:  · Fatigue: Yes - Energy level is "good," according to the patient. However, he spends 22 hours a day in bed. His wife reports that he sleeps most of this time. He does participate in family activities, parties, etc.   · Sleep Disturbance: No  · Bladder Dysfunction: Yes - He has bladder accidents at night. His wife states "I'm not even sure that he knows when he has to go." With prompting, he will go. He wakes up wet usually.    · Bowel Dysfunction: Yes - He has bowel accidents at night. He does have diarrhea "most of the time." He has a bedside commode.   · Spasticity: No  · Visual Symptoms: " Stable  · Cognitive: Yes - Impaired, but improved in days after Cytoxan. He has some issues with word finding, as well.   · Mood Disorder: Yes -He is back to taking Zoloft 150mg.   · Gait Disturbance: Yes - He does not walk with assistance at home. He has assistive devices, but does not use them. His wife states that his walk is slow.   · Falls: He had a fall out of bed in January.   · Hand Dysfunction: No  · Pain: No.   · Sexual Dysfunction: Not Assessed  · Skin Breakdown: No.   · Tremors: His wife has noticed this at times.   · Dysphagia:  No  · Dysarthria:  No  · Heat sensitivity:  Yes - He becomes irritable when out in the sun.   · Any un-met adaptive needs? No  · Copay Assist?  Yes - Cytoxan covered by insurance 100%.       Objective:        1. 25 foot timed walk: 6.5 seconds today without assist; was 7.8 seconds today without assist   Neurologic Exam      MS: continues to have flat affect; He does not initiate much conversation, but responds to questions. He is oriented to the month today, but not the day of the week, date, or year.  He knows that he is at [facility] and remembers that we are on the 6th floor. He remembers my name.  He initially stated the wrong president, but then corrected himself. .  Verbal comprehension and fluency are normal  Extraocular movements are intact.   Facial movements are normal. He has normal strength in upper and lower extremities, except 5-/5 in right iliopsoas. Rapid sequential movements are normal in the upper and lower extremities.   Reflexes are 2+ and symmetric throughout.   Gait is steady (does not use a cane today).   Romberg is negative.   Finger to nose coordination is normal.   He is able to follow 3 step commands without hesitation.   Tandem walking is slightly unsteady.   Imaging:     No new imaging to review today.     Labs:     Lab Results   Component Value Date    XKPPWHFS73LO 36 08/15/2018    [id number] 11 (L) 05/17/2017     Lab Results   Component Value Date     WBC 1.39 (LL) 02/19/2019    RBC 3.86 (L) 02/19/2019    HGB 11.0 (L) 02/19/2019    HCT 32.2 (L) 02/19/2019    MCV 83 02/19/2019    MCH 28.5 02/19/2019    MCHC 34.2 02/19/2019    RDW 13.6 02/19/2019     02/19/2019    MPV 8.4 (L) 02/19/2019    GRAN 10.0 (L) 02/19/2019    LYMPH CANCELED 02/19/2019    LYMPH 50.0 (H) 02/19/2019    MONO CANCELED 02/19/2019    MONO 26.0 (H) 02/19/2019    EOS CANCELED 02/19/2019    BASO CANCELED 02/19/2019    EOSINOPHIL 8.0 02/19/2019    BASOPHIL 0.0 02/19/2019       ** Patient was advised to call hem-onc clinic or go to ER if he has a fever greater than 100.4.   Sodium   Date Value Ref Range Status   02/19/2019 139 136 - 145 mmol/L Final     Potassium   Date Value Ref Range Status   02/19/2019 3.7 3.5 - 5.1 mmol/L Final     Chloride   Date Value Ref Range Status   02/19/2019 105 95 - 110 mmol/L Final     CO2   Date Value Ref Range Status   02/19/2019 27 23 - 29 mmol/L Final     Glucose   Date Value Ref Range Status   02/19/2019 107 70 - 110 mg/dL Final     BUN, Bld   Date Value Ref Range Status   02/19/2019 10 8 - 23 mg/dL Final     Creatinine   Date Value Ref Range Status   02/19/2019 1.2 0.5 - 1.4 mg/dL Final     Calcium   Date Value Ref Range Status   02/19/2019 10.1 8.7 - 10.5 mg/dL Final     Total Protein   Date Value Ref Range Status   02/19/2019 7.3 6.0 - 8.4 g/dL Final     Albumin   Date Value Ref Range Status   02/19/2019 3.9 3.5 - 5.2 g/dL Final   10/11/2013 4.3 3.6 - 5.1 g/dL Final     Comment:     @ Test Performed By:  [facility] M.D., [address]   [address] [place] [address]  [facility] #[id number]     Total Bilirubin   Date Value Ref Range Status   02/19/2019 0.9 0.1 - 1.0 mg/dL Final     Comment:     For infants and newborns, interpretation of results should be based  on gestational age, weight and in agreement with clinical  observations.  Premature Infant recommended reference ranges:  Up to 24  hours.............<8.0 mg/dL  Up to 48 hours............<12.0 mg/dL  3-5 days..................<15.0 mg/dL  6-29 days.................<15.0 mg/dL       Alkaline Phosphatase   Date Value Ref Range Status   02/19/2019 95 55 - 135 U/L Final     AST   Date Value Ref Range Status   02/19/2019 16 10 - 40 U/L Final     ALT   Date Value Ref Range Status   02/19/2019 12 10 - 44 U/L Final     Anion Gap   Date Value Ref Range Status   02/19/2019 7 (L) 8 - 16 mmol/L Final     eGFR if    Date Value Ref Range Status   02/19/2019 >60 >60 mL/min/1.73 m^2 Final     eGFR if non    Date Value Ref Range Status   02/19/2019 >60 >60 mL/min/1.73 m^2 Final     Comment:     Calculation used to obtain the estimated glomerular filtration  rate (eGFR) is the CKD-EPI equation.          Diagnosis/Assessment/Plan:    1. CNS vasculitis  · Assessment: Bessy's neuro exam is stable today. An increase in his Zoloft has seemed to help with his mood. We discussed setting realistic goals of more activity during the day. His wife continues to feel that there is an overall improvement in the weeks following a Cytoxan infusion.   · Imaging: MRI brain with contrast in April; ordered today  · Disease Modifying Therapies: Continue monthly Cytoxan 750mg/m2. Labs are currently monitored by Dr. Goldstein in hem/onc. We appreciate the partnership with Dr. Goldstein on this. He was advised about current low WBC count and urged to go to ER if he has a fever over 100.4.   2.   Symptom Assessment / Management  · Fatigue/Sleep: Discussed trying to set a realistic goal of getting out of bed for an additional 15 minutes a day and performing some type of exercise (perhaps using floor pedal machine), with the ultimate goal of him sleeping much less throughout the day and being more engaged.  I advised his wife that he will need many reminders about this, but he seems willing today.   · Bladder Dysfunction: provided him with a urinal to keep  at his bedside.   · Bowel Dysfunction: will monitor  · Mood Disorder: Continue Zoloft as prescribed.    Over 50% of this 30 minute visit was spent in direct face to face counseling of the patient about CNS vasculitis, DMT considerations, and symptom management. The patient agrees with the plan of care. He will follow up with Dr. Love as scheduled in April 2019 (after MRI).     Beti Boswell, [facility] Hospital[facility]-BC, MSCN      There are no diagnoses linked to this encounter.

## 2019-02-21 ENCOUNTER — OFFICE VISIT (OUTPATIENT)
Dept: NEUROLOGY | Facility: CLINIC | Age: 59
End: 2019-02-21
Payer: COMMERCIAL

## 2019-02-21 VITALS
HEIGHT: 71 IN | SYSTOLIC BLOOD PRESSURE: 123 MMHG | BODY MASS INDEX: 29.95 KG/M2 | WEIGHT: 213.94 LBS | DIASTOLIC BLOOD PRESSURE: 84 MMHG | HEART RATE: 111 BPM

## 2019-02-21 DIAGNOSIS — Z71.89 COUNSELING REGARDING GOALS OF CARE: ICD-10-CM

## 2019-02-21 DIAGNOSIS — F32.A DEPRESSION, UNSPECIFIED DEPRESSION TYPE: ICD-10-CM

## 2019-02-21 DIAGNOSIS — Z79.899 HIGH RISK MEDICATION USE: ICD-10-CM

## 2019-02-21 DIAGNOSIS — Z29.89 PROPHYLACTIC IMMUNOTHERAPY: ICD-10-CM

## 2019-02-21 DIAGNOSIS — I77.6 CNS VASCULITIS: Primary | ICD-10-CM

## 2019-02-21 DIAGNOSIS — R41.89 COGNITIVE IMPAIRMENT: ICD-10-CM

## 2019-02-21 PROCEDURE — 99214 OFFICE O/P EST MOD 30 MIN: CPT | Mod: S$GLB,,, | Performed by: CLINICAL NURSE SPECIALIST

## 2019-02-21 PROCEDURE — 99999 PR PBB SHADOW E&M-EST. PATIENT-LVL III: ICD-10-PCS | Mod: PBBFAC,,, | Performed by: CLINICAL NURSE SPECIALIST

## 2019-02-21 PROCEDURE — 99499 UNLISTED E&M SERVICE: CPT | Mod: S$PBB,,, | Performed by: CLINICAL NURSE SPECIALIST

## 2019-02-21 PROCEDURE — 99214 PR OFFICE/OUTPT VISIT, EST, LEVL IV, 30-39 MIN: ICD-10-PCS | Mod: S$GLB,,, | Performed by: CLINICAL NURSE SPECIALIST

## 2019-02-21 PROCEDURE — 99499 RISK ADDL DX/OHS AUDIT: ICD-10-PCS | Mod: S$PBB,,, | Performed by: CLINICAL NURSE SPECIALIST

## 2019-02-21 PROCEDURE — 3008F BODY MASS INDEX DOCD: CPT | Mod: CPTII,S$GLB,, | Performed by: CLINICAL NURSE SPECIALIST

## 2019-02-21 PROCEDURE — 3079F DIAST BP 80-89 MM HG: CPT | Mod: CPTII,S$GLB,, | Performed by: CLINICAL NURSE SPECIALIST

## 2019-02-21 PROCEDURE — 3079F PR MOST RECENT DIASTOLIC BLOOD PRESSURE 80-89 MM HG: ICD-10-PCS | Mod: CPTII,S$GLB,, | Performed by: CLINICAL NURSE SPECIALIST

## 2019-02-21 PROCEDURE — 3074F SYST BP LT 130 MM HG: CPT | Mod: CPTII,S$GLB,, | Performed by: CLINICAL NURSE SPECIALIST

## 2019-02-21 PROCEDURE — 99999 PR PBB SHADOW E&M-EST. PATIENT-LVL III: CPT | Mod: PBBFAC,,, | Performed by: CLINICAL NURSE SPECIALIST

## 2019-02-21 PROCEDURE — 3008F PR BODY MASS INDEX (BMI) DOCUMENTED: ICD-10-PCS | Mod: CPTII,S$GLB,, | Performed by: CLINICAL NURSE SPECIALIST

## 2019-02-21 PROCEDURE — 3074F PR MOST RECENT SYSTOLIC BLOOD PRESSURE < 130 MM HG: ICD-10-PCS | Mod: CPTII,S$GLB,, | Performed by: CLINICAL NURSE SPECIALIST

## 2019-02-27 ENCOUNTER — NURSE TRIAGE (OUTPATIENT)
Dept: ADMINISTRATIVE | Facility: CLINIC | Age: 59
End: 2019-02-27

## 2019-02-27 ENCOUNTER — HOSPITAL ENCOUNTER (EMERGENCY)
Facility: HOSPITAL | Age: 59
Discharge: HOME | End: 2019-02-27
Attending: SURGERY
Payer: COMMERCIAL

## 2019-02-27 ENCOUNTER — TELEPHONE (OUTPATIENT)
Dept: NEUROLOGY | Facility: CLINIC | Age: 59
End: 2019-02-27

## 2019-02-27 VITALS
TEMPERATURE: 98 F | OXYGEN SATURATION: 96 % | HEART RATE: 87 BPM | RESPIRATION RATE: 16 BRPM | SYSTOLIC BLOOD PRESSURE: 139 MMHG | DIASTOLIC BLOOD PRESSURE: 79 MMHG

## 2019-02-27 DIAGNOSIS — I77.6 CNS VASCULITIS: ICD-10-CM

## 2019-02-27 DIAGNOSIS — R53.83 FATIGUE: Primary | ICD-10-CM

## 2019-02-27 LAB
ALBUMIN SERPL BCP-MCNC: 3.4 G/DL
ALP SERPL-CCNC: 99 U/L
ALT SERPL W/O P-5'-P-CCNC: 8 U/L
AMPHET+METHAMPHET UR QL: NEGATIVE
ANION GAP SERPL CALC-SCNC: 8 MMOL/L
AST SERPL-CCNC: 16 U/L
BACTERIA #/AREA URNS HPF: NORMAL /HPF
BARBITURATES UR QL SCN>200 NG/ML: NEGATIVE
BASOPHILS # BLD AUTO: 0.03 K/UL
BASOPHILS NFR BLD: 0.4 %
BENZODIAZ UR QL SCN>200 NG/ML: NEGATIVE
BILIRUB SERPL-MCNC: 0.6 MG/DL
BILIRUB UR QL STRIP: NEGATIVE
BNP SERPL-MCNC: 21 PG/ML
BUN SERPL-MCNC: 15 MG/DL
BZE UR QL SCN: NEGATIVE
CALCIUM SERPL-MCNC: 9.8 MG/DL
CANNABINOIDS UR QL SCN: NEGATIVE
CHLORIDE SERPL-SCNC: 106 MMOL/L
CK MB SERPL-MCNC: 1.1 NG/ML
CK MB SERPL-MCNC: 1.1 NG/ML
CK MB SERPL-RTO: 2.6 %
CK MB SERPL-RTO: 2.7 %
CK SERPL-CCNC: 41 U/L
CK SERPL-CCNC: 41 U/L
CK SERPL-CCNC: 43 U/L
CK SERPL-CCNC: 43 U/L
CLARITY UR: CLEAR
CO2 SERPL-SCNC: 28 MMOL/L
COLOR UR: YELLOW
CREAT SERPL-MCNC: 1.1 MG/DL
CREAT UR-MCNC: 118.1 MG/DL
DIFFERENTIAL METHOD: ABNORMAL
EOSINOPHIL # BLD AUTO: 0.2 K/UL
EOSINOPHIL NFR BLD: 2 %
ERYTHROCYTE [DISTWIDTH] IN BLOOD BY AUTOMATED COUNT: 13.9 %
EST. GFR  (AFRICAN AMERICAN): >60 ML/MIN/1.73 M^2
EST. GFR  (NON AFRICAN AMERICAN): >60 ML/MIN/1.73 M^2
GLUCOSE SERPL-MCNC: 81 MG/DL
GLUCOSE UR QL STRIP: NEGATIVE
HCT VFR BLD AUTO: 30.6 %
HGB BLD-MCNC: 10.3 G/DL
HGB UR QL STRIP: ABNORMAL
HYALINE CASTS #/AREA URNS LPF: 0 /LPF
INFLUENZA A, MOLECULAR: NEGATIVE
INFLUENZA B, MOLECULAR: NEGATIVE
KETONES UR QL STRIP: NEGATIVE
LEUKOCYTE ESTERASE UR QL STRIP: NEGATIVE
LYMPHOCYTES # BLD AUTO: 0.9 K/UL
LYMPHOCYTES NFR BLD: 10.6 %
MAGNESIUM SERPL-MCNC: 1.6 MG/DL
MCH RBC QN AUTO: 28.4 PG
MCHC RBC AUTO-ENTMCNC: 33.7 G/DL
MCV RBC AUTO: 84 FL
METHADONE UR QL SCN>300 NG/ML: NEGATIVE
MICROSCOPIC COMMENT: NORMAL
MONOCYTES # BLD AUTO: 0.8 K/UL
MONOCYTES NFR BLD: 10 %
NEUTROPHILS # BLD AUTO: 6.3 K/UL
NEUTROPHILS NFR BLD: 77 %
NITRITE UR QL STRIP: NEGATIVE
OPIATES UR QL SCN: NEGATIVE
PCP UR QL SCN>25 NG/ML: NEGATIVE
PH UR STRIP: 6 [PH] (ref 5–8)
PHOSPHATE SERPL-MCNC: 4.2 MG/DL
PLATELET # BLD AUTO: 171 K/UL
PMV BLD AUTO: 8.4 FL
POCT GLUCOSE: 100 MG/DL (ref 70–110)
POCT GLUCOSE: 109 MG/DL (ref 70–110)
POTASSIUM SERPL-SCNC: 3.9 MMOL/L
PROT SERPL-MCNC: 6.8 G/DL
PROT UR QL STRIP: ABNORMAL
RBC # BLD AUTO: 3.63 M/UL
RBC #/AREA URNS HPF: 1 /HPF (ref 0–4)
SODIUM SERPL-SCNC: 142 MMOL/L
SP GR UR STRIP: 1.02 (ref 1–1.03)
SPECIMEN SOURCE: NORMAL
SQUAMOUS #/AREA URNS HPF: 0 /HPF
TOXICOLOGY INFORMATION: NORMAL
TROPONIN I SERPL DL<=0.01 NG/ML-MCNC: 0.01 NG/ML
TROPONIN I SERPL DL<=0.01 NG/ML-MCNC: 0.01 NG/ML
TSH SERPL DL<=0.005 MIU/L-ACNC: 1.86 UIU/ML
URN SPEC COLLECT METH UR: ABNORMAL
UROBILINOGEN UR STRIP-ACNC: NEGATIVE EU/DL
WBC # BLD AUTO: 8.2 K/UL
WBC #/AREA URNS HPF: 1 /HPF (ref 0–5)

## 2019-02-27 PROCEDURE — 93010 ELECTROCARDIOGRAM REPORT: CPT | Mod: ,,, | Performed by: INTERNAL MEDICINE

## 2019-02-27 PROCEDURE — 82553 CREATINE MB FRACTION: CPT | Mod: 91

## 2019-02-27 PROCEDURE — 83880 ASSAY OF NATRIURETIC PEPTIDE: CPT

## 2019-02-27 PROCEDURE — 84484 ASSAY OF TROPONIN QUANT: CPT | Mod: 91

## 2019-02-27 PROCEDURE — 93010 EKG 12-LEAD: ICD-10-PCS | Mod: ,,, | Performed by: INTERNAL MEDICINE

## 2019-02-27 PROCEDURE — 87502 INFLUENZA DNA AMP PROBE: CPT

## 2019-02-27 PROCEDURE — 93005 ELECTROCARDIOGRAM TRACING: CPT

## 2019-02-27 PROCEDURE — 82550 ASSAY OF CK (CPK): CPT | Mod: 91

## 2019-02-27 PROCEDURE — 82962 GLUCOSE BLOOD TEST: CPT

## 2019-02-27 PROCEDURE — 96361 HYDRATE IV INFUSION ADD-ON: CPT

## 2019-02-27 PROCEDURE — 85025 COMPLETE CBC W/AUTO DIFF WBC: CPT

## 2019-02-27 PROCEDURE — 83735 ASSAY OF MAGNESIUM: CPT

## 2019-02-27 PROCEDURE — 81000 URINALYSIS NONAUTO W/SCOPE: CPT | Mod: 59

## 2019-02-27 PROCEDURE — 36415 COLL VENOUS BLD VENIPUNCTURE: CPT

## 2019-02-27 PROCEDURE — 25000003 PHARM REV CODE 250: Performed by: NURSE PRACTITIONER

## 2019-02-27 PROCEDURE — 80307 DRUG TEST PRSMV CHEM ANLYZR: CPT

## 2019-02-27 PROCEDURE — 84443 ASSAY THYROID STIM HORMONE: CPT

## 2019-02-27 PROCEDURE — 99285 EMERGENCY DEPT VISIT HI MDM: CPT | Mod: 25

## 2019-02-27 PROCEDURE — 96360 HYDRATION IV INFUSION INIT: CPT

## 2019-02-27 PROCEDURE — 84100 ASSAY OF PHOSPHORUS: CPT

## 2019-02-27 PROCEDURE — 80053 COMPREHEN METABOLIC PANEL: CPT

## 2019-02-27 RX ADMIN — SODIUM CHLORIDE 1000 ML: 0.9 INJECTION, SOLUTION INTRAVENOUS at 11:02

## 2019-02-27 NOTE — ED NOTES
Pt resting comfortably on ED stretcher. NADN. AAOx3. Respirations even and unlabored. Bed locked in lowest position. Call bell within reach. Family at bedside. Fluids infusing without difficulty. Awaiting MD orders.

## 2019-02-27 NOTE — TELEPHONE ENCOUNTER
Call placed to pt's wife. States that she brought pt to ED at [facility]--they are waiting to be seen now. For the last 2-3 days, pt has refused to eat (no appetite), his blood sugar has been very low (was 49 this morning), and has had an increase in confusion. Advised Bekah that she did the right thing. Asked her to keep this office posted on his status and all would be relayed to Dr Love and Beti.

## 2019-02-27 NOTE — ED PROVIDER NOTES
Encounter Date: 2/27/2019       History     Chief Complaint   Patient presents with    Fatigue     Patient presents with generalized weakness and fatigue over the last 2-3 days.  Patient has a history of CNS vasculitis and is followed by Neurology.  He receives chemotherapy infusions for this disease process.  The wife reports that he usually has noticeable fatigue and weakness just a few days prior to his next chemotherapy infusion, but she feels like this weakness and fatigue is different.  Next chemotherapy infusion is scheduled for March 4th.  Life reports that the patient seems sleepier than normal, but remains oriented.  On interview the patient has his eyes closed, but is easily arousable.  Oriented x4.  The wife also reports a decrease in appetite.  Patient has a history of diabetes.  She checks his blood sugar regularly.  Reports that this morning the blood sugar was 49.  Denies recent falls or head injury.  Denies headache.  Denies chest pain or shortness of breath.  Denies abdominal pain. Denies nausea vomiting or diarrhea.  Denies urinary symptoms.  Denies URI symptoms or fever.      The history is provided by the patient and the spouse.     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 Love By mri.  Several active lesions, many old. 5/13: MRA brain/neck, MRI brain w/wo contrast show no vascular occlusion, multiple foci of hyperintensity in deep cerebral periventricular white matter in pattern of demyelinating process.  5/17: MRI spine performed, no spinal cord lesions. Hospitalization 6/2017. EEG was performed negative for seizures.  Repeat MRI showing new lesion, question whether this was demyelination versus CVA. 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 Findings compatible with a small acute lacunar infarct adjacent to the left frontal horn.  Nonspecific enhancement just inferior to this region and extending into the left basal ganglia, as well as within the inferior aspect of the left cerebellum.  No evidence of a focal mass. 2.  Extensive increased signal intensity involving the periventricular white matter compatible with demyelinating disease versus vasculitis. 3.  Clinical correlation and followup recommended. 4.  This reportedly flattened in the EPIC and the corpus callosum medical record system.    Mixed hyperlipidemia 11/9/2012    NAFLD (nonalcoholic fatty liver disease) 10/11/2013    CHASE (nonalcoholic steatohepatitis)     Obesity     Stroke     Type 2 diabetes mellitus with diabetic polyneuropathy, with long-term current use of insulin 5/14/2017    Type 2 diabetes mellitus with hyperglycemia, with long-term current use of insulin 10/11/2013     Past Surgical History:   Procedure Laterality Date    BIOPSY LIVER AND ULTRASOUND N/A 6/9/2015    Performed by [facility] Diagnostic Provider at [facility] OR 2ND FLR    COLONOSCOPY N/A 2/21/2014    [facility] at [facility] ENDO    ESOPHAGOGASTRODUODENOSCOPY (EGD) N/A 5/29/2017    Performed by Hai Carter MD at [facility] ENDO (2ND FLR)    FOHNEEZTB-PVAZ-J-CATH N/A 12/7/2018    Performed by [facility] Diagnostic Provider at [facility] OR 2ND FLR    SKIN CANCER EXCISION       Family History   Problem Relation Age of Onset    Heart disease Mother     Hypertension Mother     Heart failure Mother     Cataracts Mother     Cancer Father         lung    Diabetes Maternal Aunt     Stroke Sister     Rheum arthritis Paternal Grandmother     Amblyopia Neg Hx     Blindness Neg Hx     Glaucoma Neg Hx     Macular degeneration Neg Hx     Retinal detachment Neg Hx     Strabismus Neg Hx      Social History      Tobacco Use    Smoking status: Never Smoker    Smokeless tobacco: Never Used   Substance Use Topics    Alcohol use: No     Alcohol/week: 0.0 oz    Drug use: No     Review of Systems   Constitutional: Positive for fatigue. Negative for fever.   HENT: Negative for congestion, ear pain, rhinorrhea, sore throat and trouble swallowing.    Eyes: Negative for pain, discharge, redness and visual disturbance.   Respiratory: Negative for cough, shortness of breath and wheezing.    Cardiovascular: Negative for chest pain and leg swelling.   Gastrointestinal: Negative for abdominal pain, constipation, diarrhea, nausea and vomiting.   Genitourinary: Negative for difficulty urinating, dysuria, flank pain, frequency and urgency.   Musculoskeletal: Negative for arthralgias, back pain, myalgias and neck pain.   Skin: Negative for rash and wound.   Neurological: Positive for weakness. Negative for seizures, syncope, facial asymmetry, speech difficulty and headaches.   Psychiatric/Behavioral: Negative.        Physical Exam     Initial Vitals [02/27/19 0932]   BP Pulse Resp Temp SpO2   121/78 91 18 98.2 °F (36.8 °C) 100 %      MAP       --         Physical Exam    Nursing note and vitals reviewed.  Constitutional: No distress.   HENT:   Head: Normocephalic and atraumatic.   Right Ear: External ear normal.   Left Ear: External ear normal.   Nose: Nose normal.   Mouth/Throat: Oropharynx is clear and moist.   Eyes: Conjunctivae, EOM and lids are normal. Pupils are equal, round, and reactive to light.   Neck: Neck supple.   Cardiovascular: Normal rate, regular rhythm, normal heart sounds and intact distal pulses.   Pulmonary/Chest: Breath sounds normal. No respiratory distress.   Abdominal: Soft. Bowel sounds are normal. There is no tenderness.   Musculoskeletal: Normal range of motion.   Neurological: He is oriented to person, place, and time. He has normal strength. No cranial nerve deficit or sensory deficit. GCS eye subscore is  3. GCS verbal subscore is 5. GCS motor subscore is 6.   Skin: Skin is warm and dry. Capillary refill takes less than 2 seconds. No rash noted.   Psychiatric: He has a normal mood and affect. His behavior is normal.         ED Course   Procedures  Labs Reviewed   CBC W/ AUTO DIFFERENTIAL - Abnormal; Notable for the following components:       Result Value    RBC 3.63 (*)     Hemoglobin 10.3 (*)     Hematocrit 30.6 (*)     MPV 8.4 (*)     Lymph # 0.9 (*)     Gran% 77.0 (*)     Lymph% 10.6 (*)     All other components within normal limits   COMPREHENSIVE METABOLIC PANEL - Abnormal; Notable for the following components:    Albumin 3.4 (*)     ALT 8 (*)     All other components within normal limits   URINALYSIS, REFLEX TO URINE CULTURE - Abnormal; Notable for the following components:    Protein, UA 1+ (*)     Occult Blood UA Trace (*)     All other components within normal limits    Narrative:     Preferred Collection Type->Urine, Clean Catch   INFLUENZA A & B BY MOLECULAR   CK-MB   CK   B-TYPE NATRIURETIC PEPTIDE   TROPONIN I   MAGNESIUM   PHOSPHORUS   DRUG SCREEN PANEL, URINE EMERGENCY    Narrative:     Preferred Collection Type->Urine, Clean Catch   URINALYSIS MICROSCOPIC    Narrative:     Preferred Collection Type->Urine, Clean Catch   CK-MB   CK   TROPONIN I   TSH   POCT GLUCOSE   POCT GLUCOSE   POCT GLUCOSE MONITORING CONTINUOUS     EKG Readings: (Independently Interpreted)   EKG read with MD at the time it was performed. EKG without concerning findings.      ECG Results          EKG 12-lead (In process)  Result time 02/27/19 13:27:49    In process by Interface, Lab In [facility] (02/27/19 13:27:49)                 Narrative:    Test Reason : R53.83    Vent. Rate : 088 BPM     Atrial Rate : 088 BPM     P-R Int : 202 ms          QRS Dur : 088 ms      QT Int : 374 ms       P-R-T Axes : 058 -04 -39 degrees     QTc Int : 452 ms    Normal sinus rhythm  Nonspecific T wave abnormality  Abnormal ECG  When compared with ECG  of 15-OCT-2018 06:40,  Nonspecific T wave abnormality no longer evident in Anterior leads  QT has lengthened    Referred By: [email or web address]   SELF           Confirmed By:                             Imaging Results          CT Head Without Contrast (Final result)  Result time 02/27/19 11:36:03    Final result by Leida Garcia MD (02/27/19 11:36:03)                 Impression:      No acute intracranial findings.    Age-appropriate cerebral volume loss with moderate severe patchy decreased attenuation supratentorial and infratentorial white matter while nonspecific suggestive for chronic ischemic change.    No evidence for acute intracranial hemorrhage.  Clinical correlation and further evaluation as warranted.      Electronically signed by: Leida Garcia MD  Date:    02/27/2019  Time:    11:36             Narrative:    EXAMINATION:  CT HEAD WITHOUT CONTRAST    CLINICAL HISTORY:  Confusion/delirium, altered LOC, unexplained;    TECHNIQUE:  Multiple sequential 5 mm axial images of the head without contrast.  Coronal and sagittal reformatted imaging from the axial acquisition.    COMPARISON:  01/17/2019    FINDINGS:  There is mild age-appropriate generalized cerebral volume loss.  Compensatory enlargement of the ventricle sulci and cisterns without hydrocephalus.  There is no midline shift or mass effect.  There is moderate severe ill-defined decreased attenuation in the supratentorial white matter and cerebellum while nonspecific suggestive for chronic ischemic change.  There is no evidence for acute intracranial hemorrhage or sulcal effacement.  There is no midline shift or mass effect.  Prominent vascular calcifications.  Visualized paranasal sinuses and mastoid air cells are clear..                                        Medications   sodium chloride 0.9% bolus 1,000 mL (0 mLs Intravenous Stopped 2/27/19 1300)      --TSH is pending because this was a send out today.   --Patient was given a diabetic meal tray while  here in the emergency room.  He ate most of the meal and his blood sugar was checked approximately 1 hr afterwards.  No hypoglycemia while here in the emergency room.  As mentioned before patient is easily arousable and remains oriented x4.  No changes are noted in head CT.  Patient and spouse were educated sick keep a blood sugar log and follow up with the primary care doctor regarding long-term diabetes medication/management.  Patient was also educated to follow up with Neurology; wife reports that they have a follow-up appointment tomorrow.           Clinical Impression:       ICD-10-CM ICD-9-CM   1. Fatigue R53.83 780.79   2. CNS vasculitis I77.6 447.6     --No medications were prescribed at discharge.    Disposition:   Disposition: Discharged  Condition: Stable    The patient acknowledges that close follow up with medical provider is required. Instructed to follow up with PCP within 2 days. Patient was given specific return precautions. The patient agrees to comply with all instruction and directions given in the ER.                       Inés Soriano NP  02/27/19 1149

## 2019-02-27 NOTE — DISCHARGE INSTRUCTIONS
Continue to monitor blood sugar at home.  Discuss low blood sugars with PCP at follow-up.  Tried increase your daily food intake.    **Follow up with PCP in 24-48 hours.  Return to ER with worsening of symptoms.     **Drink plenty fluids. Get plenty rest. Wash hands frequently.     **Our goal in the emergency department is to always give you outstanding care and exceptional service. You may receive a survey by mail or e-mail in the next week regarding your experience in our ED. We would greatly appreciate your completing and returning the survey. Your feedback provides us with a way to recognize our staff who give very good care and it helps us learn how to improve when your experience was below our aspiration of excellence.

## 2019-02-27 NOTE — ED NOTES
Pt resting comfortably on ED stretcher. NADN. AAOx3. Respirations even and unlabored. Bed locked in lowest position. Call bell within reach. Family at bedside. Awaiting MD orders.

## 2019-02-27 NOTE — TELEPHONE ENCOUNTER
----- Message from Jose Wong sent at 2/27/2019  8:16 AM CST -----  Needs Advice    Reason for call: Bekah is asking to speak w/ Bessy about pt not being stable and not eating, possibly due to chemo.         Communication Preference: Bekah (wife) @ [phone]    Additional Information:

## 2019-02-28 ENCOUNTER — CLINICAL SUPPORT (OUTPATIENT)
Dept: INFECTIOUS DISEASES | Facility: CLINIC | Age: 59
End: 2019-02-28
Payer: COMMERCIAL

## 2019-02-28 ENCOUNTER — OFFICE VISIT (OUTPATIENT)
Dept: PSYCHIATRY | Facility: CLINIC | Age: 59
End: 2019-02-28
Payer: COMMERCIAL

## 2019-02-28 DIAGNOSIS — F32.A DEPRESSIVE DISORDER: ICD-10-CM

## 2019-02-28 DIAGNOSIS — D84.9 ACQUIRED IMMUNOCOMPROMISED STATE: ICD-10-CM

## 2019-02-28 DIAGNOSIS — I77.6 CNS VASCULITIS: ICD-10-CM

## 2019-02-28 DIAGNOSIS — F02.818 MAJOR NEUROCOGNITIVE DISORDER DUE TO ANOTHER MEDICAL CONDITION WITH BEHAVIORAL DISTURBANCE: ICD-10-CM

## 2019-02-28 DIAGNOSIS — Z23 NEED FOR HEPATITIS A AND B VACCINATION: ICD-10-CM

## 2019-02-28 PROCEDURE — 90471 IMMUNIZATION ADMIN: CPT | Mod: S$GLB,,, | Performed by: INTERNAL MEDICINE

## 2019-02-28 PROCEDURE — 99499 UNLISTED E&M SERVICE: CPT | Mod: S$PBB,,, | Performed by: SOCIAL WORKER

## 2019-02-28 PROCEDURE — 90471 HEPATITIS A HEPATITIS B COMBINED VACCINE IM: ICD-10-PCS | Mod: S$GLB,,, | Performed by: INTERNAL MEDICINE

## 2019-02-28 PROCEDURE — 99499 RISK ADDL DX/OHS AUDIT: ICD-10-PCS | Mod: S$PBB,,, | Performed by: SOCIAL WORKER

## 2019-02-28 PROCEDURE — 90847 PR FAMILY PSYCHOTHERAPY W/ PT, 50 MIN: ICD-10-PCS | Mod: S$GLB,,, | Performed by: SOCIAL WORKER

## 2019-02-28 PROCEDURE — 90636 HEPATITIS A HEPATITIS B COMBINED VACCINE IM: ICD-10-PCS | Mod: S$GLB,,, | Performed by: INTERNAL MEDICINE

## 2019-02-28 PROCEDURE — 90636 HEP A/HEP B VACC ADULT IM: CPT | Mod: S$GLB,,, | Performed by: INTERNAL MEDICINE

## 2019-02-28 PROCEDURE — 90847 FAMILY PSYTX W/PT 50 MIN: CPT | Mod: S$GLB,,, | Performed by: SOCIAL WORKER

## 2019-02-28 NOTE — Clinical Note
Per our discussion, wondering if pt could benefit from HH PT/OT 2-3 times per year.  Think we could make the case that he's homebound?  I think it's possible.  I'd suggest OP PT but he doesn't do exercises unless he's fully supervised and given prompts.

## 2019-02-28 NOTE — PROGRESS NOTES
"Family Psychotherapy (PhD/LCSW)    2/28/2019    Site: [facility]    Length of service: 45     Therapeutic intervention: 46377 (50 min) -Family therapy with patient; needed because Bessy has neurocognitive disorder and needs support of family to be able to implement any changes/recommendations    Persons present: Bessy, wife Bekah    Interval history:  Bessy arrived to session casually dressed and with adequate hygiene.  Bekah took him to the emergency room, yesterday, 2/2 low blood sugar.  He was not admitted and is doing better today.  He was oriented to person and place but not time.  Of note, today was the first time that Bessy initiated conversation with this SW, asking "How are you, today?"  Session focused on helping the couple to continue working on the goal of helping Bessy to establish and follow a daily routine with at least 10 minutes of exercise.  Bessy was receptive and was able to repeat the goal back to this writer at the end of session, but it's likely he will need reminders, supervision to follow through.  Bekah also shared that she connected the couple to the local [facility] for in-home and center services (aide, social activities).  We also discussed the resumption of  services, and SW agreed to discuss this with neurology provider.  We discussed termination of therapy and will aim for the end of April.         Target symptoms: recurrent depression, adjustment     Patient's interpersonal/verbal exchanges: 87343 (50 min) -Family therapy with patient.  Bessy's insight and judgement are limited.  Bekah with improved mood and is doing more to connect Bessy to services in and outside the home.      Progress toward goals: Fair progress    Diagnosis:  F32.9 Unspecified Depressive Disorder    F02.81 Major neurocognitive disorder due to another medical condition with behavioral disturbance    Z63.8 Family Conflict     Plan: family psychotherapy             Follow medication recommendations of psych " NP            Attend family consult with Silverio Cross PsyD in March    Return to clinic: 2 weeks

## 2019-02-28 NOTE — TELEPHONE ENCOUNTER
Reason for Disposition   Shingles (Herpes zoster; Zostavax) vaccine reactions  (all triage questions negative)    Answer Assessment - Initial Assessment Questions  Wife reported cbg was low this am- gave him peppermint candy and took him to ER. She was concerned as he has been confused for 2-3 days but since yesterday has had no appetite and keeps falling asleep. Everything checked out ok in ER today- she did report with his meds the confusion is not uncommon but the malaise is new- after she got home from the ER she remembered he got a shingles vaccine yesterday and wanted to know if this could be causing his sx's.    Protocols used: ST IMMUNIZATION [id number]

## 2019-03-03 NOTE — PROGRESS NOTES
SECTION OF HEMATOLOGY AND BONE MARROW TRANSPLANT    03/04/2019  Referred by:  Dr. Love  Referred for: Cytoxan      HISTORY OF PRESENT ILLNESS:   Presents today to clinic with wife to another cycle of cytoxan chemotherpay for CNS Vasculitis. This would be dose (#16). Neurological symptoms had improved in the past, so infusions were stopped (7/11/18). However, symptoms returned so recommendation of neurologist to resume infusions. Restarted 11/15/18. He continues to tolerate cytoxan well.  Denies fever, chills, nightsweats, bleeding, bruising, lymphadenopathy, chest pain, sob, n/v/d/c, neuropathy, signs/symptoms of splenomegaly.    Pt presented to ED 2/27 for extreme fatigue; does have chronic fatigue per wife. Per spouse, pt had been sleeping a lot more over the course of a few days. His blood sugar that morning was 49. He presented to the ED, workup was negative. CT head showing no acute process. Spouse did recall that patient did have the shingles vaccine two days prior to this incident. These symptoms have now resolved and patient is feeling back to baseline.          PAST MEDICAL HISTORY:   Past Medical History:   Diagnosis Date    Amblyopia     Cataract     CNS vasculitis 6/2/2017    Follows with Dr. Love By mri.  Several active lesions, many old. 5/13: MRA brain/neck, MRI brain w/wo contrast show no vascular occlusion, multiple foci of hyperintensity in deep cerebral periventricular white matter in pattern of demyelinating process.  5/17: MRI spine performed, no spinal cord lesions. Hospitalization 6/2017. EEG was performed negative for seizures.  Repeat MRI showing new lesion, question whether this was demyelination versus CVA. 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 Findings  compatible with a small acute lacunar infarct adjacent to the left frontal horn.  Nonspecific enhancement just inferior to this region and extending into the left basal ganglia, as well as within the inferior aspect of the left cerebellum.  No evidence of a focal mass. 2.  Extensive increased signal intensity involving the periventricular white matter compatible with demyelinating disease versus vasculitis. 3.  Clinical correlation and followup recommended. 4.  This reportedly flattened in the EPIC and the corpus callosum medical record system.    Mixed hyperlipidemia 11/9/2012    NAFLD (nonalcoholic fatty liver disease) 10/11/2013    CHASE (nonalcoholic steatohepatitis)     Obesity     Stroke     Type 2 diabetes mellitus with diabetic polyneuropathy, with long-term current use of insulin 5/14/2017    Type 2 diabetes mellitus with hyperglycemia, with long-term current use of insulin 10/11/2013       PAST SURGICAL HISTORY:   Past Surgical History:   Procedure Laterality Date    BIOPSY LIVER AND ULTRASOUND N/A 6/9/2015    Performed by [facility] at [facility] OR 2ND FLR    COLONOSCOPY N/A 2/21/2014    [facility]    ESOPHAGOGASTRODUODENOSCOPY (EGD) N/A 5/29/2017    Performed by Hai Carter MD at [facility] ENDO (2ND FLR)    UFMCSTBER-MBOO-S-CATH N/A 12/7/2018    Performed by [facility] at [facility] OR 2ND FLR    SKIN CANCER EXCISION         PAST SOCIAL HISTORY:   reports that  has never smoked. he has never used smokeless tobacco. He reports that he does not drink alcohol or use drugs.    FAMILY HISTORY:  Family History   Problem Relation Age of Onset    Heart disease Mother     Hypertension Mother     Heart failure Mother     Cataracts Mother     Cancer Father         lung    Diabetes Maternal Aunt     Stroke Sister     Rheum arthritis Paternal Grandmother     Amblyopia Neg Hx     Blindness Neg Hx     Glaucoma Neg Hx     Macular degeneration Neg  "Hx     Retinal detachment Neg Hx     Strabismus Neg Hx        CURRENT MEDICATIONS:   Current Outpatient Medications   Medication Sig    alclomethasone (ACLOVATE) 0.05 % cream     aspirin (ECOTRIN) 81 MG EC tablet Take 81 mg by mouth once daily.    atenolol (TENORMIN) 50 MG tablet Take 1 tablet (50 mg total) by mouth once daily.    atorvastatin (LIPITOR) 40 MG tablet Take 1 tablet (40 mg total) by mouth once daily.    blood sugar diagnostic (TRUE METRIX GLUCOSE TEST STRIP) Strp Test blood sugar four times a day    diltiaZEM (DILACOR XR) 240 MG CDCR Take 1 capsule (240 mg total) by mouth once daily.    flash glucose scanning reader (FREESTYLE ARELY 14 DAY READER) Misc Glucose testing fasting and prior to meals    flash glucose sensor (FREESTYLE ARELY 14 DAY SENSOR) Kit Glucose checking 4 times a day    insulin (BASAGLAR KWIKPEN U-100 INSULIN) glargine 100 units/mL (3mL) SubQ pen Inject 40 Units into the skin 2 (two) times daily. Up to 60 BID with cytoxan    insulin aspart U-100 (NOVOLOG) 100 unit/mL InPn pen Inject 20 Units into the skin 3 (three) times daily with meals. (Patient taking differently: Inject 20 Units into the skin 4 (four) times daily. )    insulin syringe-needle U-100 (INSULIN SYRINGE) 1/2 mL 30 gauge x 5/16 Syrg Use 3x/day    irbesartan (AVAPRO) 300 MG tablet Take 1 tablet (300 mg total) by mouth every evening.    ketoconazole (NIZORAL) 2 % cream     lancets 30 gauge Misc Test blood sugar four times a day    liraglutide 0.6 mg/0.1 mL, 18 mg/3 mL, subq PNIJ (VICTOZA 3-TOMASZ) 0.6 mg/0.1 mL (18 mg/3 mL) PnIj Inject 1.8 mg into the skin once daily.    metFORMIN (GLUCOPHAGE-XR) 500 MG 24 hr tablet Take 2 tablets (1,000 mg total) by mouth 2 (two) times daily with meals.    NOVOFINE PLUS 32 gauge x 1/6" Ndle     omega-3 fatty acids-vitamin E (FISH OIL) 1,000 mg Cap Take 1 capsule by mouth 2 (two) times daily.    pen needle, diabetic (BD ULTRA-FINE ANA PEN NEEDLE) 32 gauge x 5/32" Ndle Use " "one needle twice daily    pen needle, diabetic (BD ULTRA-FINE ANA PEN NEEDLE) 32 gauge x 5/32" Ndle 4 times daily insulin administration    sertraline (ZOLOFT) 100 MG tablet Take 1 tab and half (total 150mg) daily    vitamin D 1000 units Tab Take 5 tablets (5,000 Units total) by mouth once daily.     No current facility-administered medications for this visit.      ALLERGIES:   Review of patient's allergies indicates:  No Known Allergies    REVIEW OF SYSTEMS:   General ROS: Positive for fatigue.   Psychological ROS: Confusion much improved.   Ophthalmic ROS: negative  ENT ROS: negative  Allergy and Immunology ROS: negative  Hematological and Lymphatic ROS: negative  Endocrine ROS: negative  Respiratory ROS: negative  Cardiovascular ROS: negative  Gastrointestinal ROS: negative  Genito-Urinary ROS: negative  Musculoskeletal ROS: chronic back pain  Neurological ROS: see HPI  Dermatological ROS: negative    PHYSICAL EXAM:   Vitals:    03/04/19 1312   BP: 114/76   Pulse: 89   Temp: 98.2 °F (36.8 °C)       General - well developed, well nourished; much more interactive today. A+O x3  Head & Face - no sinus tenderness  Eyes - normal conjunctivae and lids   ENT - normal external auditory canals, no mouth sores  Chest and Lung - normal respiratory effort, clear to auscultation bilaterally   Cardiovascular - RRR with no MGR, normal S1 and S2; no pedal edema  Abdomen -  soft, nontender, no palpable hepatomegaly or splenomegaly  Lymph - no palpable lymphadenopathy  Extremities - unremarkable nails and digits  Heme - no bruising, petechiae, pallor  Skin - no rashes or lesions   Psych - Normal mood and affect. No confusion noted.    ECOG Performance Status: (foot note - ECOG PS provided by Eastern Cooperative Oncology Group) 1 - Symptomatic but completely ambulatory    Karnofsky Performance Score:  80%- Normal Activity with Effort: Some Symptoms of Disease  DATA:   Lab Results   Component Value Date    WBC 10.38 03/04/2019 "    HGB 11.7 (L) 03/04/2019    HCT 35.3 (L) 03/04/2019    MCV 86 03/04/2019     03/04/2019     Gran # (ANC)   Date Value Ref Range Status   03/04/2019 8.0 (H) 1.8 - 7.7 K/uL Final     Gran%   Date Value Ref Range Status   03/04/2019 76.8 (H) 38.0 - 73.0 % Final     CMP  Sodium   Date Value Ref Range Status   03/04/2019 144 136 - 145 mmol/L Final     Potassium   Date Value Ref Range Status   03/04/2019 4.8 3.5 - 5.1 mmol/L Final     Chloride   Date Value Ref Range Status   03/04/2019 107 95 - 110 mmol/L Final     CO2   Date Value Ref Range Status   03/04/2019 25 23 - 29 mmol/L Final     Glucose   Date Value Ref Range Status   03/04/2019 124 (H) 70 - 110 mg/dL Final     BUN, Bld   Date Value Ref Range Status   03/04/2019 20 8 - 23 mg/dL Final     Creatinine   Date Value Ref Range Status   03/04/2019 1.3 0.5 - 1.4 mg/dL Final     Calcium   Date Value Ref Range Status   03/04/2019 10.4 8.7 - 10.5 mg/dL Final     Total Protein   Date Value Ref Range Status   03/04/2019 7.6 6.0 - 8.4 g/dL Final     Albumin   Date Value Ref Range Status   03/04/2019 3.7 3.5 - 5.2 g/dL Final   10/11/2013 4.3 3.6 - 5.1 g/dL Final     Comment:     @ Test Performed By:  [facility] M.D., [place],   [address] [place] [id number]  IA #[id number]     Total Bilirubin   Date Value Ref Range Status   03/04/2019 0.6 0.1 - 1.0 mg/dL Final     Comment:     For infants and newborns, interpretation of results should be based  on gestational age, weight and in agreement with clinical  observations.  Premature Infant recommended reference ranges:  Up to 24 hours.............<8.0 mg/dL  Up to 48 hours............<12.0 mg/dL  3-5 days..................<15.0 mg/dL  6-29 days.................<15.0 mg/dL       Alkaline Phosphatase   Date Value Ref Range Status   03/04/2019 106 55 - 135 U/L Final     AST   Date Value Ref Range Status   03/04/2019 21 10 - 40 U/L Final     ALT   Date  Value Ref Range Status   03/04/2019 10 10 - 44 U/L Final     Anion Gap   Date Value Ref Range Status   03/04/2019 12 8 - 16 mmol/L Final     eGFR if    Date Value Ref Range Status   03/04/2019 >60.0 >60 mL/min/1.73 m^2 Final     eGFR if non    Date Value Ref Range Status   03/04/2019 58.9 (A) >60 mL/min/1.73 m^2 Final     Comment:     Calculation used to obtain the estimated glomerular filtration  rate (eGFR) is the CKD-EPI equation.          ASSESSMENT AND PLAN:   Encounter Diagnoses   Name Primary?    CNS vasculitis Yes    Encounter for chemotherapy management     Cytopenia     Major depressive disorder with single episode, in full remission     Mixed hyperlipidemia     Essential hypertension     Type 2 diabetes mellitus with diabetic polyneuropathy, with long-term current use of insulin        CNS vasculitis  -resume CTX per neuro recs  -proceed with dose #16 Cytoxan was increased to 750mg/m2 (increase of 15% of previous dose) with mesna support (mesna dose also increased 325mg/m2) for CNS vasculitis, protocol per Dr. Love; improved symptoms at increased dose   -willl clarify planned duration of monthly therapy with neurology  -No contraindication to proceed with CTX today    Cytopenias- Anemia  -mild anemia 2/2 chemotherapy  -Transfuse for Hgb<7 and Plt <10k, no indication to transfuse today  -Pt educated to notify us of any bleeding or fevers or S/S infection.     HLD  - continue with statin     HTN  - continue atenolol, irbesartan, and diltiazem     DM2  - blood glucose 124 in labs  - continue current treatment regimen     Depression  - continue zoloft     F/U:  - cbc, cmp, lab only in 2 weeks at [facility])  - same labs, MD appt, & cytoxan infusion #17 in 4 weeks      Nancy Kaba NP  Hematology/Oncology/BMT

## 2019-03-04 ENCOUNTER — OFFICE VISIT (OUTPATIENT)
Dept: HEMATOLOGY/ONCOLOGY | Facility: CLINIC | Age: 59
End: 2019-03-04
Payer: MEDICARE

## 2019-03-04 ENCOUNTER — INFUSION (OUTPATIENT)
Dept: INFUSION THERAPY | Facility: HOSPITAL | Age: 59
End: 2019-03-04
Attending: INTERNAL MEDICINE
Payer: MEDICARE

## 2019-03-04 ENCOUNTER — LAB VISIT (OUTPATIENT)
Dept: LAB | Facility: HOSPITAL | Age: 59
End: 2019-03-04
Attending: INTERNAL MEDICINE
Payer: MEDICARE

## 2019-03-04 VITALS
SYSTOLIC BLOOD PRESSURE: 89 MMHG | HEART RATE: 60 BPM | DIASTOLIC BLOOD PRESSURE: 51 MMHG | RESPIRATION RATE: 18 BRPM | HEIGHT: 71 IN | TEMPERATURE: 99 F | WEIGHT: 215.38 LBS | BODY MASS INDEX: 30.15 KG/M2

## 2019-03-04 VITALS
OXYGEN SATURATION: 98 % | HEART RATE: 89 BPM | DIASTOLIC BLOOD PRESSURE: 76 MMHG | TEMPERATURE: 98 F | WEIGHT: 215.38 LBS | BODY MASS INDEX: 30.15 KG/M2 | HEIGHT: 71 IN | SYSTOLIC BLOOD PRESSURE: 114 MMHG

## 2019-03-04 DIAGNOSIS — D70.1 CHEMOTHERAPY-INDUCED NEUTROPENIA: ICD-10-CM

## 2019-03-04 DIAGNOSIS — I95.1 ORTHOSTATIC HYPOTENSION: ICD-10-CM

## 2019-03-04 DIAGNOSIS — I77.6 CNS VASCULITIS: Primary | ICD-10-CM

## 2019-03-04 DIAGNOSIS — T45.1X5A CHEMOTHERAPY-INDUCED NEUTROPENIA: ICD-10-CM

## 2019-03-04 DIAGNOSIS — T45.1X5A ANEMIA ASSOCIATED WITH CHEMOTHERAPY: ICD-10-CM

## 2019-03-04 DIAGNOSIS — Z51.11 ENCOUNTER FOR CHEMOTHERAPY MANAGEMENT: ICD-10-CM

## 2019-03-04 DIAGNOSIS — Z79.4 TYPE 2 DIABETES MELLITUS WITH DIABETIC POLYNEUROPATHY, WITH LONG-TERM CURRENT USE OF INSULIN: ICD-10-CM

## 2019-03-04 DIAGNOSIS — I77.6 CNS VASCULITIS: ICD-10-CM

## 2019-03-04 DIAGNOSIS — E11.42 TYPE 2 DIABETES MELLITUS WITH DIABETIC POLYNEUROPATHY, WITH LONG-TERM CURRENT USE OF INSULIN: ICD-10-CM

## 2019-03-04 DIAGNOSIS — I10 ESSENTIAL HYPERTENSION: ICD-10-CM

## 2019-03-04 DIAGNOSIS — D64.81 ANEMIA ASSOCIATED WITH CHEMOTHERAPY: ICD-10-CM

## 2019-03-04 DIAGNOSIS — F32.5 MAJOR DEPRESSIVE DISORDER WITH SINGLE EPISODE, IN FULL REMISSION: Chronic | ICD-10-CM

## 2019-03-04 DIAGNOSIS — E78.2 MIXED HYPERLIPIDEMIA: ICD-10-CM

## 2019-03-04 LAB
25(OH)D3+25(OH)D2 SERPL-MCNC: 47 NG/ML
ABO + RH BLD: NORMAL
ALBUMIN SERPL BCP-MCNC: 3.7 G/DL
ALP SERPL-CCNC: 106 U/L
ALT SERPL W/O P-5'-P-CCNC: 10 U/L
ANION GAP SERPL CALC-SCNC: 12 MMOL/L
AST SERPL-CCNC: 21 U/L
BASOPHILS # BLD AUTO: 0.06 K/UL
BASOPHILS NFR BLD: 0.6 %
BILIRUB SERPL-MCNC: 0.6 MG/DL
BLD GP AB SCN CELLS X3 SERPL QL: NORMAL
BUN SERPL-MCNC: 20 MG/DL
CALCIUM SERPL-MCNC: 10.4 MG/DL
CHLORIDE SERPL-SCNC: 107 MMOL/L
CO2 SERPL-SCNC: 25 MMOL/L
CREAT SERPL-MCNC: 1.3 MG/DL
DIFFERENTIAL METHOD: ABNORMAL
EOSINOPHIL # BLD AUTO: 0.2 K/UL
EOSINOPHIL NFR BLD: 1.5 %
ERYTHROCYTE [DISTWIDTH] IN BLOOD BY AUTOMATED COUNT: 14.1 %
EST. GFR  (AFRICAN AMERICAN): >60 ML/MIN/1.73 M^2
EST. GFR  (NON AFRICAN AMERICAN): 58.9 ML/MIN/1.73 M^2
GLUCOSE SERPL-MCNC: 124 MG/DL
HCT VFR BLD AUTO: 35.3 %
HGB BLD-MCNC: 11.7 G/DL
IMM GRANULOCYTES # BLD AUTO: 0.05 K/UL
IMM GRANULOCYTES NFR BLD AUTO: 0.5 %
LYMPHOCYTES # BLD AUTO: 1.2 K/UL
LYMPHOCYTES NFR BLD: 11.4 %
MCH RBC QN AUTO: 28.3 PG
MCHC RBC AUTO-ENTMCNC: 33.1 G/DL
MCV RBC AUTO: 86 FL
MONOCYTES # BLD AUTO: 1 K/UL
MONOCYTES NFR BLD: 9.2 %
NEUTROPHILS # BLD AUTO: 8 K/UL
NEUTROPHILS NFR BLD: 76.8 %
NRBC BLD-RTO: 0 /100 WBC
PLATELET # BLD AUTO: 222 K/UL
PMV BLD AUTO: 9.1 FL
POTASSIUM SERPL-SCNC: 4.8 MMOL/L
PROT SERPL-MCNC: 7.6 G/DL
RBC # BLD AUTO: 4.13 M/UL
SODIUM SERPL-SCNC: 144 MMOL/L
WBC # BLD AUTO: 10.38 K/UL

## 2019-03-04 PROCEDURE — 99214 PR OFFICE/OUTPT VISIT, EST, LEVL IV, 30-39 MIN: ICD-10-PCS | Mod: S$GLB,,, | Performed by: NURSE PRACTITIONER

## 2019-03-04 PROCEDURE — 99999 PR PBB SHADOW E&M-EST. PATIENT-LVL III: CPT | Mod: PBBFAC,,, | Performed by: NURSE PRACTITIONER

## 2019-03-04 PROCEDURE — 85025 COMPLETE CBC W/AUTO DIFF WBC: CPT

## 2019-03-04 PROCEDURE — 3074F PR MOST RECENT SYSTOLIC BLOOD PRESSURE < 130 MM HG: ICD-10-PCS | Mod: CPTII,S$GLB,, | Performed by: NURSE PRACTITIONER

## 2019-03-04 PROCEDURE — 96361 HYDRATE IV INFUSION ADD-ON: CPT

## 2019-03-04 PROCEDURE — 86850 RBC ANTIBODY SCREEN: CPT

## 2019-03-04 PROCEDURE — 99214 OFFICE O/P EST MOD 30 MIN: CPT | Mod: S$GLB,,, | Performed by: NURSE PRACTITIONER

## 2019-03-04 PROCEDURE — 63600175 PHARM REV CODE 636 W HCPCS: Performed by: INTERNAL MEDICINE

## 2019-03-04 PROCEDURE — 99999 PR PBB SHADOW E&M-EST. PATIENT-LVL III: ICD-10-PCS | Mod: PBBFAC,,, | Performed by: NURSE PRACTITIONER

## 2019-03-04 PROCEDURE — 3008F BODY MASS INDEX DOCD: CPT | Mod: CPTII,S$GLB,, | Performed by: NURSE PRACTITIONER

## 2019-03-04 PROCEDURE — 3078F PR MOST RECENT DIASTOLIC BLOOD PRESSURE < 80 MM HG: ICD-10-PCS | Mod: CPTII,S$GLB,, | Performed by: NURSE PRACTITIONER

## 2019-03-04 PROCEDURE — 3044F PR MOST RECENT HEMOGLOBIN A1C LEVEL <7.0%: ICD-10-PCS | Mod: CPTII,S$GLB,, | Performed by: NURSE PRACTITIONER

## 2019-03-04 PROCEDURE — 25000003 PHARM REV CODE 250: Performed by: INTERNAL MEDICINE

## 2019-03-04 PROCEDURE — 3078F DIAST BP <80 MM HG: CPT | Mod: CPTII,S$GLB,, | Performed by: NURSE PRACTITIONER

## 2019-03-04 PROCEDURE — 96417 CHEMO IV INFUS EACH ADDL SEQ: CPT

## 2019-03-04 PROCEDURE — 3074F SYST BP LT 130 MM HG: CPT | Mod: CPTII,S$GLB,, | Performed by: NURSE PRACTITIONER

## 2019-03-04 PROCEDURE — 80053 COMPREHEN METABOLIC PANEL: CPT

## 2019-03-04 PROCEDURE — 82306 VITAMIN D 25 HYDROXY: CPT

## 2019-03-04 PROCEDURE — 3008F PR BODY MASS INDEX (BMI) DOCUMENTED: ICD-10-PCS | Mod: CPTII,S$GLB,, | Performed by: NURSE PRACTITIONER

## 2019-03-04 PROCEDURE — 96413 CHEMO IV INFUSION 1 HR: CPT

## 2019-03-04 PROCEDURE — A4216 STERILE WATER/SALINE, 10 ML: HCPCS | Performed by: INTERNAL MEDICINE

## 2019-03-04 PROCEDURE — 3044F HG A1C LEVEL LT 7.0%: CPT | Mod: CPTII,S$GLB,, | Performed by: NURSE PRACTITIONER

## 2019-03-04 PROCEDURE — 96367 TX/PROPH/DG ADDL SEQ IV INF: CPT

## 2019-03-04 RX ORDER — SODIUM CHLORIDE 0.9 % (FLUSH) 0.9 %
10 SYRINGE (ML) INJECTION
Status: CANCELLED | OUTPATIENT
Start: 2019-03-04

## 2019-03-04 RX ORDER — HEPARIN 100 UNIT/ML
500 SYRINGE INTRAVENOUS
Status: CANCELLED | OUTPATIENT
Start: 2019-03-04

## 2019-03-04 RX ORDER — SODIUM CHLORIDE 0.9 % (FLUSH) 0.9 %
10 SYRINGE (ML) INJECTION
Status: DISCONTINUED | OUTPATIENT
Start: 2019-03-04 | End: 2019-03-04 | Stop reason: HOSPADM

## 2019-03-04 RX ORDER — HEPARIN 100 UNIT/ML
500 SYRINGE INTRAVENOUS
Status: DISCONTINUED | OUTPATIENT
Start: 2019-03-04 | End: 2019-03-04 | Stop reason: HOSPADM

## 2019-03-04 RX ADMIN — MESNA 1500 MG: 100 INJECTION, SOLUTION INTRAVENOUS at 03:03

## 2019-03-04 RX ADMIN — HEPARIN SODIUM (PORCINE) LOCK FLUSH IV SOLN 100 UNIT/ML 500 UNITS: 100 SOLUTION at 04:03

## 2019-03-04 RX ADMIN — SODIUM CHLORIDE: 0.9 INJECTION, SOLUTION INTRAVENOUS at 02:03

## 2019-03-04 RX ADMIN — CYCLOPHOSPHAMIDE 1500 MG: 1 INJECTION, POWDER, FOR SOLUTION INTRAVENOUS; ORAL at 03:03

## 2019-03-04 RX ADMIN — Medication 10 ML: at 04:03

## 2019-03-04 RX ADMIN — DEXAMETHASONE SODIUM PHOSPHATE: 4 INJECTION, SOLUTION INTRA-ARTICULAR; INTRALESIONAL; INTRAMUSCULAR; INTRAVENOUS; SOFT TISSUE at 03:03

## 2019-03-04 NOTE — Clinical Note
- cbc, cmp, lab only in 2 weeks at [facility] ([facility] same labs, MD appt, & cytoxan infusion #17 in 4 weeks

## 2019-03-04 NOTE — NURSING
Pt. Seems very dehydrated.Added 1 litre of fluids to treatment.pt. States he has diarrhea everyday. Encouraged to take immodium. Encouraged to drink fluids.

## 2019-03-04 NOTE — PLAN OF CARE
Problem: Adult Inpatient Plan of Care  Goal: Plan of Care Review  Outcome: Ongoing (interventions implemented as appropriate)  Left feeling much better. That litre of fluid perked him up. Did well with cytoxan.

## 2019-03-05 NOTE — PROCEDURES
March 8, 2019       Keegan Elizondo  [address] [facility] [address]        Dear Mr. Elizondo,    Please carefully read through the enclosed prep instructions at least 7 days prior to your procedure with Dr Matias Felipe. If you have questions regarding the instructions, please call [phone].    Date:  April 12, 2019  Procedure Time:  Someone from the hospital will call you 2-3 business days prior with your procedure and arrival times.    Location:  [facility]  [address] [place]  [address]  [phone]  Please enter the main entrance, pass the glass elevator, and follow the orange signs for GI Services.     If you need to cancel or reschedule your procedure, please contact the  at the number listed below.    Thank you,    Gunjan [phone]  Surgery Scheduler  Pre-Admit Department                            [facility] Colonoscopy Prep  Pre-Procedure Instructions    Matias Felipe MD           SEVEN (7) DAYS BEFORE THE PROCEDURE:  Do not take any Plavix, Brillinta, Effient, Aggrenox, Pepto-Bismol or iron supplements. If you have any questions or concerns about holding any of these medications, please contact your cardiologist or primary care physician.    THREE (3) DAYS BEFORE THE PROCEDURE:  Do not take any Coumadin (warfarin) or Pradaxa. Please contact your cardiologist or primary care physician for any questions or concerns regarding holding this medication.      Please contact your primary care physician if you normally take antibiotics before dental procedures, or if you take insulin for diabetes.    Purchase some clear liquids (without red or purple coloring) to drink or eat. Clear liquids include: water, fruit juices without pulp (apple, white grape), coffee or tea without creamer, Gatorade/clear sports drinks, Popsicles, carbonated or non-carbonated soft drinks, Julio Cesar-aid or other flavored drinks, plain Jell-O without fruit or toppings, hard candy and  "Bessy Nunez is a 57 y.o. male patient.    Temp: 99.5 °F (37.5 °C) (05/14/17 1155)  Pulse: 97 (05/14/17 1500)  Resp: 18 (05/14/17 1155)  BP: (!) 146/87 (05/14/17 1155)  SpO2: 96 % (05/14/17 1155)  Weight: 93.6 kg (206 lb 5.6 oz) (05/13/17 0552)  Height: 5' 10" (177.8 cm) (05/13/17 0552)       Lumbar Puncture  Date/Time: 5/14/2017 3:22 PM  Performed by: SHARON LUONG.  Authorized by: SHARON LUONG.   Consent Done: Yes  Indications: evaluation for infection and evaluation for altered mental status (eval for MS)  Anesthesia: local infiltration    Anesthesia:  Anesthesia: local infiltration  Local Anesthetic: lidocaine 1% without epinephrine   Lumbar space: L4-L5 interspace  Patient's position: left lateral decubitus  Number of attempts: 2  Opening pressure: 19 cm H2O  Comments: Consent obtained from both wife and patient.          Sharon Luong  5/14/2017  " beef, chicken or vegetable broth or bouillon.      Avoid seeds, nuts, corn and popcorn for 7 days prior to the procedure.    Make arrangements for someone to drive you home after the procedure because you will be very drowsy. Please make these arrangements in advance. A taxi is not acceptable transportation. If you do not have transportation arranged, we will not be able to do the colonoscopy.    Make arrangements for someone to stay with you or check in on you frequently the rest of the day/evening until the drowsiness has completely worn off.    Due to everyone's busy schedules, it is important that you keep your appointment. If you must reschedule or cancel your appointment, please notify your physician's office at least 48 hours in advance.                          THE DAY BEFORE YOUR COLONOSCOPY:    · You may have only clear liquids to eat or drink all day long. You may not have any solid foods or dairy products, and you may not eat or drink anything that is red or purple. Try to drink 8 oz every 60 minutes throughout the day. The more clear liquids you drink, the better off you will be.    · Do not drink any alcoholic beverages for at least 24 hours before the procedure.    · Do not take Lomotil, Imodium, Effer-syllium, Metamucil, Citrucel, Konsyl, Hydrocil, or FiberCon.    DAY OF YOUR COLONOSCOPY:    · You may have clear liquids until your arrival time at the hospital.    · Do not take any medications until after the procedure.    · You will be given your bowel prep at the facility    AFTER YOUR COLONOSCOPY:    Following the colonoscopy, you will be given after-procedure information and instructions.  In addition:    · Do not plan on going to work or doing anything for the rest of the day.    · You may resume eating and drinking with no limitations following the procedure.      Please call your physician's office at [phone] if you have any questions regarding the prep instructions.  If you need to  reschedule please contact the surgery scheduler Gunjan [phone].         Thank you for entrusting your care to [facility].

## 2019-03-13 ENCOUNTER — OFFICE VISIT (OUTPATIENT)
Dept: PSYCHIATRY | Facility: CLINIC | Age: 59
End: 2019-03-13
Payer: MEDICARE

## 2019-03-13 DIAGNOSIS — Z63.8 FAMILY CONFLICT: ICD-10-CM

## 2019-03-13 DIAGNOSIS — I77.6 CNS VASCULITIS: ICD-10-CM

## 2019-03-13 DIAGNOSIS — F32.A DEPRESSIVE DISORDER: ICD-10-CM

## 2019-03-13 DIAGNOSIS — F02.818 MAJOR NEUROCOGNITIVE DISORDER DUE TO ANOTHER MEDICAL CONDITION WITH BEHAVIORAL DISTURBANCE: ICD-10-CM

## 2019-03-13 PROCEDURE — 99499 RISK ADDL DX/OHS AUDIT: ICD-10-PCS | Mod: S$GLB,,, | Performed by: SOCIAL WORKER

## 2019-03-13 PROCEDURE — 90847 FAMILY PSYTX W/PT 50 MIN: CPT | Mod: S$GLB,,, | Performed by: SOCIAL WORKER

## 2019-03-13 PROCEDURE — 90847 PR FAMILY PSYCHOTHERAPY W/ PT, 50 MIN: ICD-10-PCS | Mod: S$GLB,,, | Performed by: SOCIAL WORKER

## 2019-03-13 PROCEDURE — 99499 UNLISTED E&M SERVICE: CPT | Mod: S$GLB,,, | Performed by: SOCIAL WORKER

## 2019-03-13 SDOH — SOCIAL DETERMINANTS OF HEALTH (SDOH): OTHER SPECIFIED PROBLEMS RELATED TO PRIMARY SUPPORT GROUP: Z63.8

## 2019-03-13 NOTE — PROGRESS NOTES
"Family Psychotherapy (PhD/LCSW)    3/13/2019    Site: [facility]    Length of service: 35     Therapeutic intervention: 99552 (50 min) -Family therapy with patient; needed because Bessy has neurocognitive disorder and needs support of family to be able to implement any changes/recommendations    Persons present: Bessy, wife Bekah    Interval history:  Bessy and his wife were scheduled to see this JULIAN tomorrow, but were confused about the appointment date and arrived today.  JULIAN was able to fit them in and offered a 35 min session.  Both arrived to session neatly dressed and with good hygiene; Bessy's moreno and hair were cut/shaved and Bekah commented that he allowed her perform these activities without opposition.  Bekah continues to observe a pattern of increased energy, compliance, and cognitive functioning for approximately 1 week following Bessy's Cytoxan infusions, followed by gradual decline in all areas.  She states Bessy is back to spending most of his daytime hours in bed and is resistant to walks, sitting outside, etc.  We reviewed the goals we set last week with Bessy - walk/exercise 10 mins/day, complete daily hygiene/dressing with assistance and without resistance, complete one chore (simple) with reminder.  Bessy did not remember the details of these goals but that he was generally being asked to do more.  Initially, Bessy resisted sharing his perception that "everyone wants me to do what they want and to do too much".  When Bekah repeated the goals again, and reminded Bessy that she'll help him, he acknowledged that he can do these things and that he needs/want to help out, more.  JULIAN reiterated throughout that Bessy will need reminding and support and even reminders about these tasks being part of the goals that he set in counseling.      Bekah is still waiting to hear from the local [facility] about aide services and will follow up with them.  She is having some work done around the house in " preparation for selling their home.  She would like to move to [place] with her family, where she'll have more help with the care of Bessy.  Bessy is in agreement with this plan.          Target symptoms: recurrent depression, adjustment     Patient's interpersonal/verbal exchanges: 72314 (50 min) -Family therapy with patient.  Bessy's insight and judgement are limited.  Bekah with improved mood and is doing more to connect Bessy to services in and outside the home.      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

## 2019-03-21 DIAGNOSIS — Z12.11 SCREENING FOR MALIGNANT NEOPLASM OF COLON: Primary | ICD-10-CM

## 2019-03-27 ENCOUNTER — OFFICE VISIT (OUTPATIENT)
Dept: PSYCHIATRY | Facility: CLINIC | Age: 59
End: 2019-03-27
Payer: COMMERCIAL

## 2019-03-27 ENCOUNTER — OFFICE VISIT (OUTPATIENT)
Dept: NEUROLOGY | Facility: CLINIC | Age: 59
End: 2019-03-27
Payer: COMMERCIAL

## 2019-03-27 DIAGNOSIS — Z63.8 FAMILY CONFLICT: ICD-10-CM

## 2019-03-27 DIAGNOSIS — F32.A DEPRESSIVE DISORDER: Primary | ICD-10-CM

## 2019-03-27 PROCEDURE — 90846 FAMILY PSYTX W/O PT 50 MIN: CPT | Mod: S$GLB,,, | Performed by: PSYCHIATRY & NEUROLOGY

## 2019-03-27 PROCEDURE — 90846 PR FAMILY PSYCHOTHERAPY W/O PT, 50 MIN: ICD-10-PCS | Mod: S$GLB,,, | Performed by: SOCIAL WORKER

## 2019-03-27 PROCEDURE — 90846 FAMILY PSYTX W/O PT 50 MIN: CPT | Mod: S$GLB,,, | Performed by: SOCIAL WORKER

## 2019-03-27 PROCEDURE — 99499 NO LOS: ICD-10-PCS | Mod: S$GLB,,, | Performed by: PSYCHIATRY & NEUROLOGY

## 2019-03-27 PROCEDURE — 99499 UNLISTED E&M SERVICE: CPT | Mod: S$GLB,,, | Performed by: PSYCHIATRY & NEUROLOGY

## 2019-03-27 PROCEDURE — 90846 PR FAMILY PSYCHOTHERAPY W/O PT, 50 MIN: ICD-10-PCS | Mod: S$GLB,,, | Performed by: PSYCHIATRY & NEUROLOGY

## 2019-03-27 SDOH — SOCIAL DETERMINANTS OF HEALTH (SDOH): OTHER SPECIFIED PROBLEMS RELATED TO PRIMARY SUPPORT GROUP: Z63.8

## 2019-03-27 NOTE — PROGRESS NOTES
Family Psychotherapy (PhD/LCSW)    3/27/2019    Site: [facility]    Length of service: 40    Therapeutic intervention: 90846-Family therapy without patient; needed because discussing sensitive subject of long-term care for patient that was only introduced today at an earlier appointment with JENS Cross PsyD    Persons present: spouse and son     Interval history: SW met for just a few minutes with Bessy and his wife and son, prior to meeting alone with them.  Bessy was pleasant and denied any concerns.  He did not recall the goals we set at the last session and his wife reported that he's been difficult to engage in all self-care activities and exercise.  Continued meeting with pt's wife Bekah and son Marky.  Both shared their understanding that Barrys stroke had caused permanent injury and cognitive impairment and that it may be time to consider long-term care options, even nursing home placement.  Bekah was tearful and described how difficult this would be for both she and patient.  Marky was supportive of his mother through this discussion.  We also explored the ways the benefits of long-term care of Barrys care and the family's emotional well-being.  Finally, we briefly discussed financial concerns and SW addressed questions to the best of her ability.  We decided on the following next steps:  1) Bekah to visit a nursing home and discuss costs of care and if Mr. Nunez is eligible for Medicaid  a. [facility] in [facility]. Another thing to keep in mind is that if Mr. Nunez resides in a nursing home for more than 90 days, he could become eligible for the Community [employer] Waiver program, which would provide aide services in the home.  2) Compare the cost of a nursing home with what youd pay to have a private aide in the home for 8-10 hours a day, 5 days/week (example: $15/hr x 8 hrs/day x 5 days/week x 4 weeks = $2400/month)  3) Consider consulting an Elder Care  to discuss your finances  and how to ensure there is enough money to meet your needs and Mr. Herrmann needs for as long as possible.  donal Ann [phone]  megha Macias [phone]      Target symptoms: recurrent depression, confusion, adjustment, cognitive impairments     Patient's interpersonal/verbal exchanges: 74516-Family therapy without patient: patient not present    Progress toward goals: progressing slowly    Diagnosis:    F32.9 Unspecified Depressive Disorder                       F01.51 Major vascular neurocognitive disorder, probable, with behavioral disturbance (per MEGHA Cross, PsAlex)                                Z63.8 Family Conflict                Plan: family psychotherapy    Return to clinic: 2 weeks

## 2019-03-27 NOTE — PROGRESS NOTES
"PSYCHOTHERAPY PROGRESS NOTE  CONFIDENTIAL    NAME: Bessy Nunez   MRN: [id number]   TYPE OF SERVICE: Family Psychotherapy without patient present  DATE: 3/27/2019   TIME: 2pm   SESSION: 1  LENGTH: 50 minutes (81026)    HPI: Mr. Nunez is previously known to me from an evaluation in 1/2018. Notes from that evaluation include: "Mr. Nunez has experienced a drastic change in cognitive and daily functioning following a stroke in 5/2017. He has some mild improvements in cognition, but he has not returned to baseline. He requires close to 24/7 supervision from his wife. He primarily described problems with short-term memory. His wife also described difficulties with attention and judgment/impulsivity." Mr. Lopez was not independent in any complex activities of daily living. He also presented with considerable apathy/reduced initiation.      INTERVAL HISTORY: The present evaluation was requested to provide caregiver education in the setting of dementia. Mr. Nunez's wife remained his primary caregiver while their son moved from [place] to help with his care. However, concerns remained about their appreciation of his capabilities and care needs. As an example, Mr. Nunez has urinary and fecal incontinence, but his wife believes he may be doing this intentionally to her. He has urinary incontinence on a daily basis and fecal incontinence more sporadically. He wears Depends, but they provide limited resistance. It is difficult to encourage him to shower/bathe. As a result, Mrs. Nunez has been less insistent on him bathing due to feeling he is doing this intentionally to her. Mr and Mrs. Nunez apparently use different bathrooms, which Mrs. Nunez acknowledged was at least partially due to avoidance on her part. This recently led to the toilet being clogged in his bathroom since he doesn't remember to flush the toilet.     Mr. Nunez son, Marky, expressed frustration on his current level of care. He indicated that his mother " "always calls him when faced with a difficult situation. He stated, "my life is on hold." He is also frustrated by barriers to receiving a higher level of care, such as home health. As a result, he wants his mother to consider additional caregiving options, including transition to a nursing home. Mrs. Nunez had not been amenable to this option in the past, but recognized in this session that it may be in everyone's best interest as he does require a very high level of care/support.     DIAGNOSIS:  1. 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     THERAPEUTIC APPROACH: Psychoeducation. Client Centered Therapy.     Silverio Cross Psy.D., ABPP  Board Certified in Clinical Neuropsychology  [facility] - Department of Neurology  "

## 2019-03-28 ENCOUNTER — TELEPHONE (OUTPATIENT)
Dept: ENDOSCOPY | Facility: HOSPITAL | Age: 59
End: 2019-03-28

## 2019-03-28 ENCOUNTER — OFFICE VISIT (OUTPATIENT)
Dept: FAMILY MEDICINE | Facility: CLINIC | Age: 59
End: 2019-03-28
Payer: MEDICARE

## 2019-03-28 VITALS
OXYGEN SATURATION: 97 % | TEMPERATURE: 98 F | WEIGHT: 228.06 LBS | DIASTOLIC BLOOD PRESSURE: 68 MMHG | HEIGHT: 71 IN | SYSTOLIC BLOOD PRESSURE: 106 MMHG | BODY MASS INDEX: 31.93 KG/M2 | HEART RATE: 60 BPM

## 2019-03-28 DIAGNOSIS — I10 ESSENTIAL HYPERTENSION: ICD-10-CM

## 2019-03-28 DIAGNOSIS — Z79.4 CONTROLLED TYPE 2 DIABETES MELLITUS WITH DIABETIC NEPHROPATHY, WITH LONG-TERM CURRENT USE OF INSULIN: Primary | ICD-10-CM

## 2019-03-28 DIAGNOSIS — Z86.010 HX OF COLONIC POLYP: Chronic | ICD-10-CM

## 2019-03-28 DIAGNOSIS — N28.1 RENAL CYST: ICD-10-CM

## 2019-03-28 DIAGNOSIS — I63.81 LACUNAR STROKE OF LEFT SUBTHALAMIC REGION: ICD-10-CM

## 2019-03-28 DIAGNOSIS — K26.9 DUODENAL ULCER: ICD-10-CM

## 2019-03-28 DIAGNOSIS — E11.21 CONTROLLED TYPE 2 DIABETES MELLITUS WITH DIABETIC NEPHROPATHY, WITH LONG-TERM CURRENT USE OF INSULIN: Primary | ICD-10-CM

## 2019-03-28 DIAGNOSIS — I77.6 CNS VASCULITIS: ICD-10-CM

## 2019-03-28 DIAGNOSIS — S32.040S CLOSED COMPRESSION FRACTURE OF FOURTH LUMBAR VERTEBRA, SEQUELA: ICD-10-CM

## 2019-03-28 DIAGNOSIS — Z12.11 SCREENING FOR COLON CANCER: ICD-10-CM

## 2019-03-28 PROCEDURE — 3044F HG A1C LEVEL LT 7.0%: CPT | Mod: CPTII,S$GLB,, | Performed by: INTERNAL MEDICINE

## 2019-03-28 PROCEDURE — 99499 RISK ADDL DX/OHS AUDIT: ICD-10-PCS | Mod: S$GLB,,, | Performed by: INTERNAL MEDICINE

## 2019-03-28 PROCEDURE — 3008F BODY MASS INDEX DOCD: CPT | Mod: CPTII,S$GLB,, | Performed by: INTERNAL MEDICINE

## 2019-03-28 PROCEDURE — 99214 PR OFFICE/OUTPT VISIT, EST, LEVL IV, 30-39 MIN: ICD-10-PCS | Mod: S$GLB,,, | Performed by: INTERNAL MEDICINE

## 2019-03-28 PROCEDURE — 99214 OFFICE O/P EST MOD 30 MIN: CPT | Mod: S$GLB,,, | Performed by: INTERNAL MEDICINE

## 2019-03-28 PROCEDURE — 3044F PR MOST RECENT HEMOGLOBIN A1C LEVEL <7.0%: ICD-10-PCS | Mod: CPTII,S$GLB,, | Performed by: INTERNAL MEDICINE

## 2019-03-28 PROCEDURE — 3078F DIAST BP <80 MM HG: CPT | Mod: CPTII,S$GLB,, | Performed by: INTERNAL MEDICINE

## 2019-03-28 PROCEDURE — 99499 UNLISTED E&M SERVICE: CPT | Mod: S$GLB,,, | Performed by: INTERNAL MEDICINE

## 2019-03-28 PROCEDURE — 99999 PR PBB SHADOW E&M-EST. PATIENT-LVL IV: ICD-10-PCS | Mod: PBBFAC,,, | Performed by: INTERNAL MEDICINE

## 2019-03-28 PROCEDURE — 3008F PR BODY MASS INDEX (BMI) DOCUMENTED: ICD-10-PCS | Mod: CPTII,S$GLB,, | Performed by: INTERNAL MEDICINE

## 2019-03-28 PROCEDURE — 3074F SYST BP LT 130 MM HG: CPT | Mod: CPTII,S$GLB,, | Performed by: INTERNAL MEDICINE

## 2019-03-28 PROCEDURE — 99999 PR PBB SHADOW E&M-EST. PATIENT-LVL IV: CPT | Mod: PBBFAC,,, | Performed by: INTERNAL MEDICINE

## 2019-03-28 PROCEDURE — 3078F PR MOST RECENT DIASTOLIC BLOOD PRESSURE < 80 MM HG: ICD-10-PCS | Mod: CPTII,S$GLB,, | Performed by: INTERNAL MEDICINE

## 2019-03-28 PROCEDURE — 3074F PR MOST RECENT SYSTOLIC BLOOD PRESSURE < 130 MM HG: ICD-10-PCS | Mod: CPTII,S$GLB,, | Performed by: INTERNAL MEDICINE

## 2019-03-28 NOTE — PATIENT INSTRUCTIONS
TELL SOMEONE IF YOU THINK YOUR BLOOD SUGAR IS LOW.    PHYSICAL ACTIVITY 30 MINUTES A DAY EVERY DAY.

## 2019-03-28 NOTE — PROGRESS NOTES
This note was created by combination of typed  and Dragon dictation.  Transcription errors may be present.  If there are any questions, please contact me.    Assessment & Plan:   Controlled type 2 diabetes mellitus with diabetic nephropathy, with long-term current use of insulin  -check upcoming A1c. Has some hypoglycemia but he is not very good at advocating for himself.  Has a CGM and wife checks pretty frequently. No change in insulin regimen - currently basaglar 40 (60 on chemo days) and novolog SSI typically 20 units with meals.  Referral to podiatry for nail care.  -     Ambulatory referral to Podiatry  -     Hemoglobin A1c; Standing    Essential hypertension  -stable on current regimen. Upcoming CMP    Duodenal ulcer 6/2017 EGD - duodenum and gastric ulcer s/p cautery    Renal cyst bilateral simple and minimally, complicated renal cysts.    Closed compression fracture of fourth lumbar vertebra, sequela  -will discuss with neuro about bisphosphonate but I don't foresee an issue. Discussed fosamax and SE profile.    Hx of colonic polyp 2014 rec's repeat 3 years    CNS vasculitis  Lacunar stroke of left subthalamic region  -upcoming cytoxan infusion; on zoloft; followed by psychiatry as well; seems to be responding better to higher dose cytoxan - lasting longer.    Screening for colon cancer  -colonoscopy ordered. Hx of colon polyp last done 2014 rec's were repeat 3 years so she's due.  -     Case request GI: COLONOSCOPY    There are no discontinued medications.    meds sent this encounter:       Follow Up: No follow-ups on file.    Subjective:     Chief Complaint   Patient presents with    Diabetes    referral to podiatry       CHRISTAL  Bessy is a 59 y.o. male, last appointment with this clinic was 12/14/2018.    Last seen in December.  Diabetes, I had submitted order for continuous glucose monitor.  Family had increased his Basaglar to 60 twice daily.  His glucose bumps after Cytoxan infusion.  I  increased his Zoloft to 100.  Previous high of 150.  Hx of L4 compression fracture.     Here with wife. History from wife predominantly.    Monthly cytoxan infusion. Wife notes that the week prior cognitively he declines. And less verbal.  Wife has to remind him to go void regularly else incontinence.  And remind him to eat.  The higher dose of chemotherapy - wife notes that it does help - lasts longer.   Sleeping a lot.  Has been chronically fatigued.  Hard to motivate him to get out to walk.     Got the CGM. [facility] does not cover this but they will continue to get this out of pocket.    Last 7 days avg glc 137  Last 30 114    glc this AM fasting 74.  Symptoms - fatigue.   8:30  10:30 73    The glucometer - when they replace the sensor, initially it will show readings of 49, 50 - she'll do finger stick - it's erroneously low because they just put on a new patch so false low.    basaglar 40 units; chemo days 60  novolog sliding scale; can be roughly 20 units.    victoza 1.8 lunch  Metformin typically just once daily in AM. Hard to cooperate for nighttime dose.    Eats when told.  But low self advocacy for hypoglycemia episodes.  He notes he'll feel tired but in a different way.  Emphasized with him the importance of alerting someone when he might be low but with his cognitive issues, self advocacy may be an ongoing problem.     Needs nails trimmed.  Needs to see podiatry.      Patient Care Team:  Edgar Nova MD as PCP - General (Internal Medicine)  Promise Steele NP as Nurse Practitioner (Endocrinology)  Shana Love MD as Consulting Physician (Neurology)    Patient Active Problem List    Diagnosis Date Noted    Duodenal ulcer 6/2017 EGD - duodenum and gastric ulcer s/p cautery 03/28/2019 6/2017 EGD showing duodenal and gastric ulcer which were treated with cautery.      Renal cyst bilateral simple and minimally, complicated renal cysts. 03/28/2019    Orthostatic hypotension 03/04/2019    Optic  atrophy, right eye 02/06/2019    Central scotoma, right eye 02/06/2019    Encounter for chemotherapy management 12/07/2018    Encounter for long term current use of cyclophosphamide 10/30/2018    Need for Tdap vaccination 08/29/2018    Need for hepatitis A and B vaccination 08/29/2018    Need for pneumococcal vaccination 08/29/2018    Acquired immunocompromised state 08/29/2018     cyclophosphamide      Controlled type 2 diabetes mellitus with diabetic nephropathy, with long-term current use of insulin 08/01/2018    Chemotherapy induced nausea and vomiting 06/12/2018    Dysphasia     Aphasia 12/04/2017    Dysphonia 12/04/2017    Cognitive deficits 12/04/2017    Closed compression fracture of L4 lumbar vertebra on MRI 11/2017 11/15/2017     11/2017 MRI L spine: Acute burst type fracture of the L4 vertebra with fracture line extending to the posterior cortex of the L4 vertebra. There is loss of central vertebral body height of the superior end plate of approximately 40%.      Adverse effect of glucocorticoids and synthetic analogues, initial encounter 09/15/2017    Lacunar stroke of left subthalamic region 09/14/2017 9/14/2017 MRI brain: 1. Findings compatible with a small acute lacunar infarct adjacent to the left frontal horn.  Nonspecific enhancement just inferior to this region and extending into the left basal ganglia, as well as within the inferior aspect of the left cerebellum.  No evidence of a focal mass.  2.  Extensive increased signal intensity involving the periventricular white matter compatible with demyelinating disease versus vasculitis.  3.  Clinical correlation and followup recommended.  4.  This reportedly flattened in the EPIC and the corpus callosum medical record system.      Episodic confusion      Improved now that back on cyclophosphamide      Cytopenia 08/30/2017    Impaired functional mobility, balance, gait, and endurance 06/14/2017    Urinary incontinence 06/04/2017     CNS vasculitis failed imuran; on cytoxan 06/02/2017     Failed Cytoxan hiatus and transition to Imuran Sept/Oct 2018;   Now much improved back on Cytoxan;       Encephalopathy 05/26/2017    Type 2 diabetes mellitus with diabetic polyneuropathy, with long-term current use of insulin 05/14/2017    Amblyopia 05/13/2017    Hx of colonic polyp 2014 rec's repeat 3 years 01/30/2015 2/21/2014 colonoscopy showing mild nonspecific acute and chronic inflammation of the ascending colon.  Hyperplastic polyp. Repeat 3 years.      Lipodystrophy 10/11/2013    NAFLD (nonalcoholic fatty liver disease) 6/12/2015 liver biopsy showing advanced stage fibrosis with bridging fibrosis and scattered nodules. 10/11/2013     6/12/2015 liver biopsy showing advanced stage fibrosis with bridging fibrosis and scattered nodules.      Obesity (BMI 30-39.9) 10/11/2013    ED (erectile dysfunction) 10/11/2013     JOHN with CPAP at night 10/11/2013    Episode of recurrent major depressive disorder 11/09/2012    Mixed hyperlipidemia 11/09/2012    Essential hypertension 5/2016 TTE diastolic dysfunction 11/09/2012 5/11/2016 TTE:   1 - Mildly depressed left ventricular systolic function (EF 45-50%).  Mild global hypokinesis at rest. 2 - Eccentric hypertrophy. 3 - Left ventricular diastolic dysfunction.         PAST MEDICAL HISTORY:  Past Medical History:   Diagnosis Date    Amblyopia     Cataract     CNS vasculitis 6/2/2017    Follows with Dr. Love By mri.  Several active lesions, many old. 5/13: MRA brain/neck, MRI brain w/wo contrast show no vascular occlusion, multiple foci of hyperintensity in deep cerebral periventricular white matter in pattern of demyelinating process.  5/17: MRI spine performed, no spinal cord lesions. Hospitalization 6/2017. EEG was performed negative for seizures.  Repeat MRI showing new lesion, question whether this was demyelination versus CVA. 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 Findings compatible with a small acute lacunar infarct adjacent to the left frontal horn.  Nonspecific enhancement just inferior to this region and extending into the left basal ganglia, as well as within the inferior aspect of the left cerebellum.  No evidence of a focal mass. 2.  Extensive increased signal intensity involving the periventricular white matter compatible with demyelinating disease versus vasculitis. 3.  Clinical correlation and followup recommended. 4.  This reportedly flattened in the EPIC and the corpus callosum medical record system.    Mixed hyperlipidemia 11/9/2012    NAFLD (nonalcoholic fatty liver disease) 10/11/2013    CHASE (nonalcoholic steatohepatitis)     Obesity     Stroke     Type 2 diabetes mellitus with diabetic polyneuropathy, with long-term current use of insulin 5/14/2017    Type 2 diabetes mellitus with hyperglycemia, with long-term current use of insulin 10/11/2013       PAST SURGICAL HISTORY:  Past Surgical History:   Procedure Laterality Date    BIOPSY LIVER AND ULTRASOUND N/A 6/9/2015    Performed by [facility] OR 2ND FLR    COLONOSCOPY N/A 2/21/2014    [facility]    ESOPHAGOGASTRODUODENOSCOPY (EGD) N/A 5/29/2017    Performed by Hai Carter MD at [facility] ENDO (2ND FLR)    [id number]-CATH N/A 12/7/2018    Performed by [facility] at [facility] OR 2ND FLR    SKIN CANCER EXCISION         SOCIAL HISTORY:  Social History     Socioeconomic History    Marital status:      Spouse name: Not on file    Number of children: Not on file    Years of education: Not on file    Highest education level: Not on file   Occupational History    Occupation: unemployed   Social Needs    Financial resource  strain: Not on file    Food insecurity:     Worry: Not on file     Inability: Not on file    Transportation needs:     Medical: Not on file     Non-medical: Not on file   Tobacco Use    Smoking status: Never Smoker    Smokeless tobacco: Never Used   Substance and Sexual Activity    Alcohol use: No     Alcohol/week: 0.0 oz    Drug use: No    Sexual activity: Not on file   Lifestyle    Physical activity:     Days per week: Not on file     Minutes per session: Not on file    Stress: Not on file   Relationships    Social connections:     Talks on phone: Not on file     Gets together: Not on file     Attends Jew service: Not on file     Active member of club or organization: Not on file     Attends meetings of clubs or organizations: Not on file     Relationship status: Not on file    Intimate partner violence:     Fear of current or ex partner: Not on file     Emotionally abused: Not on file     Physically abused: Not on file     Forced sexual activity: Not on file   Other Topics Concern    Not on file   Social History Narrative    Not on file       ALLERGIES AND MEDICATIONS: updated and reviewed.  Review of patient's allergies indicates:  No Known Allergies  Current Outpatient Medications   Medication Sig Dispense Refill    alclomethasone (ACLOVATE) 0.05 % cream       aspirin (ECOTRIN) 81 MG EC tablet Take 81 mg by mouth once daily.      atenolol (TENORMIN) 50 MG tablet Take 1 tablet (50 mg total) by mouth once daily. 90 tablet 3    atorvastatin (LIPITOR) 40 MG tablet Take 1 tablet (40 mg total) by mouth once daily. 90 tablet 3    blood sugar diagnostic (TRUE METRIX GLUCOSE TEST STRIP) Strp Test blood sugar four times a day 400 each 11    diltiaZEM (DILACOR XR) 240 MG CDCR Take 1 capsule (240 mg total) by mouth once daily. 90 capsule 3    flash glucose scanning reader (FREESTYLE ARELY 14 DAY READER) Misc Glucose testing fasting and prior to meals 1 each 0    flash glucose sensor (FREESTYLE  "ARELY 14 DAY SENSOR) Kit Glucose checking 4 times a day 2 kit 11    insulin (BASAGLAR KWIKPEN U-100 INSULIN) glargine 100 units/mL (3mL) SubQ pen Inject 40 Units into the skin 2 (two) times daily. Up to 60 BID with cytoxan 2 Box 11    insulin aspart U-100 (NOVOLOG) 100 unit/mL InPn pen Inject 20 Units into the skin 3 (three) times daily with meals. (Patient taking differently: Inject 20 Units into the skin 4 (four) times daily. ) 18 mL 11    insulin syringe-needle U-100 (INSULIN SYRINGE) 1/2 mL 30 gauge x 5/16 Syrg Use 3x/day 300 each 3    irbesartan (AVAPRO) 300 MG tablet Take 1 tablet (300 mg total) by mouth every evening. 90 tablet 3    ketoconazole (NIZORAL) 2 % cream       lancets 30 gauge Misc Test blood sugar four times a day 100 each 11    liraglutide 0.6 mg/0.1 mL, 18 mg/3 mL, subq PNIJ (VICTOZA 3-TOMASZ) 0.6 mg/0.1 mL (18 mg/3 mL) PnIj Inject 1.8 mg into the skin once daily. 9 mL 11    metFORMIN (GLUCOPHAGE-XR) 500 MG 24 hr tablet Take 2 tablets (1,000 mg total) by mouth 2 (two) times daily with meals. 360 tablet 3    NOVOFINE PLUS 32 gauge x 1/6" Ndle       omega-3 fatty acids-vitamin E (FISH OIL) 1,000 mg Cap Take 1 capsule by mouth 2 (two) times daily.  0    pen needle, diabetic (BD ULTRA-FINE ANA PEN NEEDLE) 32 gauge x 5/32" Ndle Use one needle twice daily 100 each 3    pen needle, diabetic (BD ULTRA-FINE ANA PEN NEEDLE) 32 gauge x 5/32" Ndle 4 times daily insulin administration 400 each 5    sertraline (ZOLOFT) 100 MG tablet Take 1 tab and half (total 150mg) daily 45 tablet 1    vitamin D 1000 units Tab Take 5 tablets (5,000 Units total) by mouth once daily.       No current facility-administered medications for this visit.        Review of Systems   All other systems reviewed and are negative.      Objective:   Physical Exam   Vitals:    03/28/19 1120   BP: 106/68   Pulse: 60   Temp: 98 °F (36.7 °C)   SpO2: 97%   Weight: 103.4 kg (228 lb 1.1 oz)   Height: 5' 11" (1.803 m)    Body mass " "index is 31.81 kg/m².  Weight: 103.4 kg (228 lb 1.1 oz)   Height: 5' 11" (180.3 cm)     Physical Exam   Constitutional: He is oriented to person, place, and time. He appears well-developed and well-nourished. No distress.   HENT:   Head: Normocephalic and atraumatic.   Eyes: No scleral icterus.   Pulmonary/Chest: Effort normal.   Neurological: He is alert and oriented to person, place, and time.   Skin: Skin is warm and dry.   Psychiatric: He has a normal mood and affect. His behavior is normal. Thought content normal.   Quiet but appropriate responses to questions.     "

## 2019-04-01 ENCOUNTER — INFUSION (OUTPATIENT)
Dept: INFUSION THERAPY | Facility: HOSPITAL | Age: 59
End: 2019-04-01
Attending: INTERNAL MEDICINE
Payer: MEDICARE

## 2019-04-01 ENCOUNTER — OFFICE VISIT (OUTPATIENT)
Dept: HEMATOLOGY/ONCOLOGY | Facility: CLINIC | Age: 59
End: 2019-04-01
Payer: MEDICARE

## 2019-04-01 ENCOUNTER — LAB VISIT (OUTPATIENT)
Dept: LAB | Facility: HOSPITAL | Age: 59
End: 2019-04-01
Attending: INTERNAL MEDICINE
Payer: MEDICARE

## 2019-04-01 VITALS
HEIGHT: 71 IN | HEART RATE: 64 BPM | TEMPERATURE: 98 F | SYSTOLIC BLOOD PRESSURE: 115 MMHG | BODY MASS INDEX: 30.83 KG/M2 | DIASTOLIC BLOOD PRESSURE: 58 MMHG | RESPIRATION RATE: 18 BRPM | WEIGHT: 220.25 LBS

## 2019-04-01 VITALS
BODY MASS INDEX: 30.83 KG/M2 | HEIGHT: 71 IN | HEART RATE: 65 BPM | WEIGHT: 220.25 LBS | TEMPERATURE: 98 F | RESPIRATION RATE: 17 BRPM | DIASTOLIC BLOOD PRESSURE: 66 MMHG | OXYGEN SATURATION: 98 % | SYSTOLIC BLOOD PRESSURE: 107 MMHG

## 2019-04-01 DIAGNOSIS — E11.21 CONTROLLED TYPE 2 DIABETES MELLITUS WITH DIABETIC NEPHROPATHY, WITH LONG-TERM CURRENT USE OF INSULIN: ICD-10-CM

## 2019-04-01 DIAGNOSIS — D70.1 CHEMOTHERAPY-INDUCED NEUTROPENIA: ICD-10-CM

## 2019-04-01 DIAGNOSIS — Z09 CHEMOTHERAPY FOLLOW-UP EXAMINATION: Primary | ICD-10-CM

## 2019-04-01 DIAGNOSIS — I77.6 CNS VASCULITIS: ICD-10-CM

## 2019-04-01 DIAGNOSIS — I77.6 CNS VASCULITIS: Primary | ICD-10-CM

## 2019-04-01 DIAGNOSIS — E11.9 TYPE 2 DIABETES MELLITUS WITHOUT COMPLICATION: ICD-10-CM

## 2019-04-01 DIAGNOSIS — T45.1X5A CHEMOTHERAPY-INDUCED NEUTROPENIA: ICD-10-CM

## 2019-04-01 DIAGNOSIS — Z79.4 CONTROLLED TYPE 2 DIABETES MELLITUS WITH DIABETIC NEPHROPATHY, WITH LONG-TERM CURRENT USE OF INSULIN: ICD-10-CM

## 2019-04-01 LAB
ALBUMIN SERPL BCP-MCNC: 3.7 G/DL (ref 3.5–5.2)
ALP SERPL-CCNC: 70 U/L (ref 55–135)
ALT SERPL W/O P-5'-P-CCNC: 15 U/L (ref 10–44)
ANION GAP SERPL CALC-SCNC: 11 MMOL/L (ref 8–16)
AST SERPL-CCNC: 22 U/L (ref 10–40)
BASOPHILS # BLD AUTO: 0.05 K/UL (ref 0–0.2)
BASOPHILS NFR BLD: 0.6 % (ref 0–1.9)
BILIRUB SERPL-MCNC: 0.6 MG/DL (ref 0.1–1)
BUN SERPL-MCNC: 17 MG/DL (ref 8–23)
CALCIUM SERPL-MCNC: 9.8 MG/DL (ref 8.7–10.5)
CHLORIDE SERPL-SCNC: 105 MMOL/L (ref 95–110)
CHOLEST SERPL-MCNC: 174 MG/DL (ref 120–199)
CHOLEST/HDLC SERPL: 3.5 {RATIO} (ref 2–5)
CO2 SERPL-SCNC: 26 MMOL/L (ref 23–29)
CREAT SERPL-MCNC: 1 MG/DL (ref 0.5–1.4)
DIFFERENTIAL METHOD: ABNORMAL
EOSINOPHIL # BLD AUTO: 0.2 K/UL (ref 0–0.5)
EOSINOPHIL NFR BLD: 2.1 % (ref 0–8)
ERYTHROCYTE [DISTWIDTH] IN BLOOD BY AUTOMATED COUNT: 15.3 % (ref 11.5–14.5)
EST. GFR  (AFRICAN AMERICAN): >60 ML/MIN/1.73 M^2
EST. GFR  (NON AFRICAN AMERICAN): >60 ML/MIN/1.73 M^2
ESTIMATED AVG GLUCOSE: 131 MG/DL (ref 68–131)
GLUCOSE SERPL-MCNC: 148 MG/DL (ref 70–110)
HBA1C MFR BLD HPLC: 6.2 % (ref 4–5.6)
HCT VFR BLD AUTO: 34.4 % (ref 40–54)
HDLC SERPL-MCNC: 50 MG/DL (ref 40–75)
HDLC SERPL: 28.7 % (ref 20–50)
HGB BLD-MCNC: 11.2 G/DL (ref 14–18)
IMM GRANULOCYTES # BLD AUTO: 0.05 K/UL (ref 0–0.04)
IMM GRANULOCYTES NFR BLD AUTO: 0.6 % (ref 0–0.5)
LDLC SERPL CALC-MCNC: 82 MG/DL (ref 63–159)
LYMPHOCYTES # BLD AUTO: 1.1 K/UL (ref 1–4.8)
LYMPHOCYTES NFR BLD: 13.2 % (ref 18–48)
MCH RBC QN AUTO: 27.7 PG (ref 27–31)
MCHC RBC AUTO-ENTMCNC: 32.6 G/DL (ref 32–36)
MCV RBC AUTO: 85 FL (ref 82–98)
MONOCYTES # BLD AUTO: 0.7 K/UL (ref 0.3–1)
MONOCYTES NFR BLD: 8.4 % (ref 4–15)
NEUTROPHILS # BLD AUTO: 6.4 K/UL (ref 1.8–7.7)
NEUTROPHILS NFR BLD: 75.1 % (ref 38–73)
NONHDLC SERPL-MCNC: 124 MG/DL
NRBC BLD-RTO: 0 /100 WBC
PLATELET # BLD AUTO: 168 K/UL (ref 150–350)
PMV BLD AUTO: 9.8 FL (ref 9.2–12.9)
POTASSIUM SERPL-SCNC: 3.8 MMOL/L (ref 3.5–5.1)
PROT SERPL-MCNC: 7.1 G/DL (ref 6–8.4)
RBC # BLD AUTO: 4.05 M/UL (ref 4.6–6.2)
SODIUM SERPL-SCNC: 142 MMOL/L (ref 136–145)
TRIGL SERPL-MCNC: 210 MG/DL (ref 30–150)
WBC # BLD AUTO: 8.55 K/UL (ref 3.9–12.7)

## 2019-04-01 PROCEDURE — 3008F PR BODY MASS INDEX (BMI) DOCUMENTED: ICD-10-PCS | Mod: CPTII,S$GLB,, | Performed by: INTERNAL MEDICINE

## 2019-04-01 PROCEDURE — 99499 UNLISTED E&M SERVICE: CPT | Mod: S$GLB,,, | Performed by: INTERNAL MEDICINE

## 2019-04-01 PROCEDURE — 99499 RISK ADDL DX/OHS AUDIT: ICD-10-PCS | Mod: S$GLB,,, | Performed by: INTERNAL MEDICINE

## 2019-04-01 PROCEDURE — 80061 LIPID PANEL: CPT

## 2019-04-01 PROCEDURE — 83036 HEMOGLOBIN GLYCOSYLATED A1C: CPT

## 2019-04-01 PROCEDURE — 99214 PR OFFICE/OUTPT VISIT, EST, LEVL IV, 30-39 MIN: ICD-10-PCS | Mod: S$GLB,,, | Performed by: INTERNAL MEDICINE

## 2019-04-01 PROCEDURE — 3008F BODY MASS INDEX DOCD: CPT | Mod: CPTII,S$GLB,, | Performed by: INTERNAL MEDICINE

## 2019-04-01 PROCEDURE — 3078F PR MOST RECENT DIASTOLIC BLOOD PRESSURE < 80 MM HG: ICD-10-PCS | Mod: CPTII,S$GLB,, | Performed by: INTERNAL MEDICINE

## 2019-04-01 PROCEDURE — 85025 COMPLETE CBC W/AUTO DIFF WBC: CPT

## 2019-04-01 PROCEDURE — 3074F SYST BP LT 130 MM HG: CPT | Mod: CPTII,S$GLB,, | Performed by: INTERNAL MEDICINE

## 2019-04-01 PROCEDURE — 25000003 PHARM REV CODE 250: Performed by: INTERNAL MEDICINE

## 2019-04-01 PROCEDURE — 36415 COLL VENOUS BLD VENIPUNCTURE: CPT

## 2019-04-01 PROCEDURE — 99999 PR PBB SHADOW E&M-EST. PATIENT-LVL IV: ICD-10-PCS | Mod: PBBFAC,,, | Performed by: INTERNAL MEDICINE

## 2019-04-01 PROCEDURE — 99214 OFFICE O/P EST MOD 30 MIN: CPT | Mod: S$GLB,,, | Performed by: INTERNAL MEDICINE

## 2019-04-01 PROCEDURE — 96413 CHEMO IV INFUSION 1 HR: CPT

## 2019-04-01 PROCEDURE — 3078F DIAST BP <80 MM HG: CPT | Mod: CPTII,S$GLB,, | Performed by: INTERNAL MEDICINE

## 2019-04-01 PROCEDURE — 63600175 PHARM REV CODE 636 W HCPCS: Mod: JG | Performed by: INTERNAL MEDICINE

## 2019-04-01 PROCEDURE — 96367 TX/PROPH/DG ADDL SEQ IV INF: CPT

## 2019-04-01 PROCEDURE — 99999 PR PBB SHADOW E&M-EST. PATIENT-LVL IV: CPT | Mod: PBBFAC,,, | Performed by: INTERNAL MEDICINE

## 2019-04-01 PROCEDURE — 80053 COMPREHEN METABOLIC PANEL: CPT

## 2019-04-01 PROCEDURE — 3074F PR MOST RECENT SYSTOLIC BLOOD PRESSURE < 130 MM HG: ICD-10-PCS | Mod: CPTII,S$GLB,, | Performed by: INTERNAL MEDICINE

## 2019-04-01 RX ORDER — SODIUM CHLORIDE 0.9 % (FLUSH) 0.9 %
10 SYRINGE (ML) INJECTION
Status: CANCELLED | OUTPATIENT
Start: 2019-04-01

## 2019-04-01 RX ORDER — HEPARIN 100 UNIT/ML
500 SYRINGE INTRAVENOUS
Status: DISCONTINUED | OUTPATIENT
Start: 2019-04-01 | End: 2019-04-01 | Stop reason: HOSPADM

## 2019-04-01 RX ORDER — HEPARIN 100 UNIT/ML
500 SYRINGE INTRAVENOUS
Status: CANCELLED | OUTPATIENT
Start: 2019-04-01

## 2019-04-01 RX ORDER — SODIUM CHLORIDE 0.9 % (FLUSH) 0.9 %
10 SYRINGE (ML) INJECTION
Status: DISCONTINUED | OUTPATIENT
Start: 2019-04-01 | End: 2019-04-01 | Stop reason: HOSPADM

## 2019-04-01 RX ADMIN — CYCLOPHOSPHAMIDE 1500 MG: 1 INJECTION, POWDER, FOR SOLUTION INTRAVENOUS; ORAL at 02:04

## 2019-04-01 RX ADMIN — PALONOSETRON: 0.25 INJECTION, SOLUTION INTRAVENOUS at 01:04

## 2019-04-01 RX ADMIN — MESNA 1500 MG: 100 INJECTION, SOLUTION INTRAVENOUS at 01:04

## 2019-04-01 NOTE — PLAN OF CARE
Problem: Adult Inpatient Plan of Care  Goal: Plan of Care Review  Outcome: Ongoing (interventions implemented as appropriate)  Pt tolerated infusion. VSS. Port hep-locked and de-accessed. No questions at this time.

## 2019-04-01 NOTE — Clinical Note
- cbc, cmp, lab only in 2 weeks at [place] ([place])- same labs, NP appt, & cytoxan infusion   4 weeks

## 2019-04-01 NOTE — PLAN OF CARE
Problem: Anemia (Chemotherapy Effects)  Goal: Anemia Symptom Improvement  Outcome: Ongoing (interventions implemented as appropriate)  Pt here today for mesna and cytoxan. Denies new or worsening symptoms. Port accessed easily, blood return present, no questions at this time.    Problem: Nausea and Vomiting (Chemotherapy Effects)  Goal: Fluid and Electrolyte Balance  Outcome: Ongoing (interventions implemented as appropriate)  Pt reports nausea usually comes after infusion. Currently has none. Has scripts at home for management. No questions  At this time.

## 2019-04-01 NOTE — PROGRESS NOTES
SECTION OF HEMATOLOGY AND BONE MARROW TRANSPLANT    04/02/2019  Referred by:  Dr. Love  Referred for: Cytoxan      HISTORY OF PRESENT ILLNESS:   Presents today to clinic with wife to another cycle of cytoxan chemotherpay for CNS Vasculitis. This would be dose (#20).  Intermittent since 2016. Neurological symptoms have improved overall however still waxes and wanes.  Plan to continue for now per neurology.  He has largely tolerated therapy with no major complications other than brief periods of neutropenia. 3      PAST MEDICAL HISTORY:   Past Medical History:   Diagnosis Date    Amblyopia     Cataract     CNS vasculitis 6/2/2017    Follows with Dr. Love By mri.  Several active lesions, many old. 5/13: MRA brain/neck, MRI brain w/wo contrast show no vascular occlusion, multiple foci of hyperintensity in deep cerebral periventricular white matter in pattern of demyelinating process.  5/17: MRI spine performed, no spinal cord lesions. Hospitalization 6/2017. EEG was performed negative for seizures.  Repeat MRI showing new lesion, question whether this was demyelination versus CVA. 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 Findings compatible with a small acute lacunar infarct adjacent to the left frontal horn.  Nonspecific enhancement just inferior to this region and extending into the left basal ganglia, as well as within the inferior aspect of the left cerebellum.  No evidence of a focal mass. 2.  Extensive increased signal intensity involving the periventricular white matter compatible with demyelinating disease versus vasculitis. 3.  Clinical correlation and followup recommended. 4.  This reportedly flattened in the EPIC and the corpus callosum medical record system.    Mixed hyperlipidemia  11/9/2012    NAFLD (nonalcoholic fatty liver disease) 10/11/2013    CHASE (nonalcoholic steatohepatitis)     Obesity     Stroke     Type 2 diabetes mellitus with diabetic polyneuropathy, with long-term current use of insulin 5/14/2017    Type 2 diabetes mellitus with hyperglycemia, with long-term current use of insulin 10/11/2013       PAST SURGICAL HISTORY:   Past Surgical History:   Procedure Laterality Date    BIOPSY LIVER AND ULTRASOUND N/A 6/9/2015    Performed by [facility] OR 2ND FLR    COLONOSCOPY N/A 2/21/2014    Performed by [facility] at [facility] ENDO    ESOPHAGOGASTRODUODENOSCOPY (EGD) N/A 5/29/2017    Performed by Hai Carter MD at [facility] ENDO (2ND FLR)    GJQERHPOD-BHHU-Y-CATH N/A 12/7/2018    Performed by [facility] at [facility] OR 2ND FLR    SKIN CANCER EXCISION         PAST SOCIAL HISTORY:   reports that he has never smoked. He has never used smokeless tobacco. He reports that he does not drink alcohol or use drugs.    FAMILY HISTORY:  Family History   Problem Relation Age of Onset    Heart disease Mother     Hypertension Mother     Heart failure Mother     Cataracts Mother     Cancer Father         lung    Diabetes Maternal Aunt     Stroke Sister     Rheum arthritis Paternal Grandmother     Amblyopia Neg Hx     Blindness Neg Hx     Glaucoma Neg Hx     Macular degeneration Neg Hx     Retinal detachment Neg Hx     Strabismus Neg Hx        CURRENT MEDICATIONS:   Current Outpatient Medications   Medication Sig    alclomethasone (ACLOVATE) 0.05 % cream     aspirin (ECOTRIN) 81 MG EC tablet Take 81 mg by mouth once daily.    atenolol (TENORMIN) 50 MG tablet Take 1 tablet (50 mg total) by mouth once daily.    atorvastatin (LIPITOR) 40 MG tablet Take 1 tablet (40 mg total) by mouth once daily.    blood sugar diagnostic (TRUE METRIX GLUCOSE TEST STRIP) Strp Test blood sugar four times a day    diltiaZEM (DILACOR XR) 240 MG CDCR Take 1  "capsule (240 mg total) by mouth once daily.    flash glucose scanning reader (FREESTYLE ARELY 14 DAY READER) Misc Glucose testing fasting and prior to meals    flash glucose sensor (FREESTYLE ARELY 14 DAY SENSOR) Kit Glucose checking 4 times a day    insulin (BASAGLAR KWIKPEN U-100 INSULIN) glargine 100 units/mL (3mL) SubQ pen Inject 40 Units into the skin 2 (two) times daily. Up to 60 BID with cytoxan    insulin aspart U-100 (NOVOLOG) 100 unit/mL InPn pen Inject 20 Units into the skin 3 (three) times daily with meals. (Patient taking differently: Inject 20 Units into the skin 4 (four) times daily. )    insulin syringe-needle U-100 (INSULIN SYRINGE) 1/2 mL 30 gauge x 5/16 Syrg Use 3x/day    irbesartan (AVAPRO) 300 MG tablet Take 1 tablet (300 mg total) by mouth every evening.    ketoconazole (NIZORAL) 2 % cream     lancets 30 gauge Misc Test blood sugar four times a day    liraglutide 0.6 mg/0.1 mL, 18 mg/3 mL, subq PNIJ (VICTOZA 3-TOMASZ) 0.6 mg/0.1 mL (18 mg/3 mL) PnIj Inject 1.8 mg into the skin once daily.    metFORMIN (GLUCOPHAGE-XR) 500 MG 24 hr tablet Take 2 tablets (1,000 mg total) by mouth 2 (two) times daily with meals.    NOVOFINE PLUS 32 gauge x 1/6" Ndle     omega-3 fatty acids-vitamin E (FISH OIL) 1,000 mg Cap Take 1 capsule by mouth 2 (two) times daily.    pen needle, diabetic (BD ULTRA-FINE ANA PEN NEEDLE) 32 gauge x 5/32" Ndle Use one needle twice daily    pen needle, diabetic (BD ULTRA-FINE ANA PEN NEEDLE) 32 gauge x 5/32" Ndle 4 times daily insulin administration    sertraline (ZOLOFT) 100 MG tablet Take 1 tab and half (total 150mg) daily    vitamin D 1000 units Tab Take 5 tablets (5,000 Units total) by mouth once daily.     No current facility-administered medications for this visit.      ALLERGIES:   Review of patient's allergies indicates:  No Known Allergies    REVIEW OF SYSTEMS:   General ROS: Positive for fatigue.   Psychological ROS: Confusion much improved.   Ophthalmic ROS: " negative  ENT ROS: negative  Allergy and Immunology ROS: negative  Hematological and Lymphatic ROS: negative  Endocrine ROS: negative  Respiratory ROS: negative  Cardiovascular ROS: negative  Gastrointestinal ROS: negative  Genito-Urinary ROS: negative  Musculoskeletal ROS: chronic back pain  Neurological ROS: see HPI  Dermatological ROS: negative    PHYSICAL EXAM:   Vitals:    04/01/19 1124   BP: 107/66   Pulse: 65   Resp: 17   Temp: 98 °F (36.7 °C)       General - well developed, well nourished; much more interactive today. A+O x3  Head & Face - no sinus tenderness  Eyes - normal conjunctivae and lids   ENT - normal external auditory canals, no mouth sores  Chest and Lung - normal respiratory effort, clear to auscultation bilaterally   Cardiovascular - RRR with no MGR, normal S1 and S2; no pedal edema  Abdomen -  soft, nontender, no palpable hepatomegaly or splenomegaly  Lymph - no palpable lymphadenopathy  Extremities - unremarkable nails and digits  Heme - no bruising, petechiae, pallor  Skin - no rashes or lesions   Psych - Normal mood and affect. No confusion noted.    ECOG Performance Status: (foot note - ECOG PS provided by Eastern Cooperative Oncology Group) 1 - Symptomatic but completely ambulatory    Karnofsky Performance Score:  80%- Normal Activity with Effort: Some Symptoms of Disease  DATA:   Lab Results   Component Value Date    WBC 8.55 04/01/2019    HGB 11.2 (L) 04/01/2019    HCT 34.4 (L) 04/01/2019    MCV 85 04/01/2019     04/01/2019     Gran # (ANC)   Date Value Ref Range Status   04/01/2019 6.4 1.8 - 7.7 K/uL Final     Gran%   Date Value Ref Range Status   04/01/2019 75.1 (H) 38.0 - 73.0 % Final     CMP  Sodium   Date Value Ref Range Status   04/01/2019 142 136 - 145 mmol/L Final     Potassium   Date Value Ref Range Status   04/01/2019 3.8 3.5 - 5.1 mmol/L Final     Chloride   Date Value Ref Range Status   04/01/2019 105 95 - 110 mmol/L Final     CO2   Date Value Ref Range Status    04/01/2019 26 23 - 29 mmol/L Final     Glucose   Date Value Ref Range Status   04/01/2019 148 (H) 70 - 110 mg/dL Final     BUN, Bld   Date Value Ref Range Status   04/01/2019 17 8 - 23 mg/dL Final     Creatinine   Date Value Ref Range Status   04/01/2019 1.0 0.5 - 1.4 mg/dL Final     Calcium   Date Value Ref Range Status   04/01/2019 9.8 8.7 - 10.5 mg/dL Final     Total Protein   Date Value Ref Range Status   04/01/2019 7.1 6.0 - 8.4 g/dL Final     Albumin   Date Value Ref Range Status   04/01/2019 3.7 3.5 - 5.2 g/dL Final   10/11/2013 4.3 3.6 - 5.1 g/dL Final     Comment:     @ Test Performed By:  [facility]  AMELIA Vázquez M.D.,   [address] [place] [id number]  [facility] #[id number]     Total Bilirubin   Date Value Ref Range Status   04/01/2019 0.6 0.1 - 1.0 mg/dL Final     Comment:     For infants and newborns, interpretation of results should be based  on gestational age, weight and in agreement with clinical  observations.  Premature Infant recommended reference ranges:  Up to 24 hours.............<8.0 mg/dL  Up to 48 hours............<12.0 mg/dL  3-5 days..................<15.0 mg/dL  6-29 days.................<15.0 mg/dL       Alkaline Phosphatase   Date Value Ref Range Status   04/01/2019 70 55 - 135 U/L Final     AST   Date Value Ref Range Status   04/01/2019 22 10 - 40 U/L Final     ALT   Date Value Ref Range Status   04/01/2019 15 10 - 44 U/L Final     Anion Gap   Date Value Ref Range Status   04/01/2019 11 8 - 16 mmol/L Final     eGFR if    Date Value Ref Range Status   04/01/2019 >60.0 >60 mL/min/1.73 m^2 Final     eGFR if non    Date Value Ref Range Status   04/01/2019 >60.0 >60 mL/min/1.73 m^2 Final     Comment:     Calculation used to obtain the estimated glomerular filtration  rate (eGFR) is the CKD-EPI equation.          ASSESSMENT AND PLAN:   Encounter Diagnoses   Name Primary?    Chemotherapy follow-up  examination Yes    CNS vasculitis        CNS vasculitis  -resume CTX per neuro recs  -proceed with dose #20 Cytoxan was decreased  to 600 mg/m2  (due to CrCl) with mesna support  for CNS vasculitis, protocol per Dr. Love; improved symptoms since initiation   -willl continue clarify planned duration of monthly therapy with neurology  -No contraindication to proceed with CTX today    Cytopenias- Anemia  -mild anemia 2/2 chemotherapy  -Transfuse for Hgb<7 and Plt <10k, no indication to transfuse today  -Pt educated to notify us of any bleeding or fevers or S/S infection.       -chronic medical issues per pcp      F/U:  - cbc, cmp, lab only in 2 weeks at [facility])  - same labs, NP,  appt, & cytoxan infusion    in 4 weeks

## 2019-04-05 NOTE — PROGRESS NOTES
a1c good.  Had c/o episodes of hypoglycemia at visit. On long acting 40 and short acting SSI typically 20 units with meals  Lipid OK on statin TG high  CMP WNL  CBC mild anemia stable    Results to pt via my [facility]. If still episodes of hypoglycemia would reduce mealtime dose of insulin by 5 units.

## 2019-04-10 ENCOUNTER — OFFICE VISIT (OUTPATIENT)
Dept: PSYCHIATRY | Facility: CLINIC | Age: 59
End: 2019-04-10
Payer: COMMERCIAL

## 2019-04-10 ENCOUNTER — PATIENT MESSAGE (OUTPATIENT)
Dept: FAMILY MEDICINE | Facility: CLINIC | Age: 59
End: 2019-04-10

## 2019-04-10 DIAGNOSIS — M80.80XD OTHER OSTEOPOROSIS WITH CURRENT PATHOLOGICAL FRACTURE WITH ROUTINE HEALING, SUBSEQUENT ENCOUNTER: ICD-10-CM

## 2019-04-10 DIAGNOSIS — F32.A DEPRESSIVE DISORDER: Primary | ICD-10-CM

## 2019-04-10 DIAGNOSIS — Z63.8 FAMILY CONFLICT: ICD-10-CM

## 2019-04-10 PROBLEM — M80.00XD OSTEOPOROSIS WITH CURRENT PATHOLOGICAL FRACTURE WITH ROUTINE HEALING: Status: ACTIVE | Noted: 2019-04-10

## 2019-04-10 PROCEDURE — 90846 PR FAMILY PSYCHOTHERAPY W/O PT, 50 MIN: ICD-10-PCS | Mod: S$GLB,,, | Performed by: SOCIAL WORKER

## 2019-04-10 PROCEDURE — 90846 FAMILY PSYTX W/O PT 50 MIN: CPT | Mod: S$GLB,,, | Performed by: SOCIAL WORKER

## 2019-04-10 RX ORDER — ALENDRONATE SODIUM 70 MG/1
70 TABLET ORAL
Qty: 4 TABLET | Refills: 11 | Status: SHIPPED | OUTPATIENT
Start: 2019-04-10 | End: 2019-12-19 | Stop reason: SDUPTHER

## 2019-04-10 SDOH — SOCIAL DETERMINANTS OF HEALTH (SDOH): OTHER SPECIFIED PROBLEMS RELATED TO PRIMARY SUPPORT GROUP: Z63.8

## 2019-04-10 NOTE — PROGRESS NOTES
"Family Psychotherapy (PhD/LCSW)    4/10/2019    Site: [facility]    Length of service: 50    Therapeutic intervention: 90846-Family therapy without patient; needed because discussing sensitive subject of long-term care for patient that was only introduced today at an earlier appointment with JENS Cross PsyD    Persons present: Bessy & son (5 mins), Son (45 mins)    Interval history: JULIAN met for just a few minutes with Bessy and his son, prior to meeting with Marky, alone.    Bessy was pleasant and denied any concerns, other than sharing that his wife and son are still "always telling me what to do".  SW questioned Bessy about the reasons for their constant reminders and he shared his belief that they do this because they think he's not doing the things they ask of him.  He stated that they should check first, before telling him what to do, again.  However, Marky shared that nothing has changed and that Bessy remains apathetic and does not follow through with requests without direct support and supervision.  Bessy did not connect this apathy as related to brain damage from his stroke, so SW reminded him again of these changes.  Continued meeting alone with Marky, who was less argumentative with his father today.  He acknowledges that Bessy will always need support and supervision and understood that this is related to his brain injury, not because of laziness.  However, he shared that his mother is depressed and overwhelmed and left the home for her brother's, last week.  So, Bessy has been staying with Marky.  Marky believes his mother did do some research into nursing homes and private duty caregivers, but she told him that both options were too expensive.  It's unclear whether she inquired about Bessy's eligibility for Medicaid.  It's also not clear if she spoke with the Elder Law attorneys.  JULIAN will follow up with Bekah.  Marky was tearful during session, sharing how he also feels overwhelmed and how the " relationships with his parents are stressed.  He is considering trying to get POA for Bessy, so SW provided education about that process.      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

## 2019-04-15 ENCOUNTER — HOSPITAL ENCOUNTER (OUTPATIENT)
Dept: RADIOLOGY | Facility: HOSPITAL | Age: 59
Discharge: HOME | End: 2019-04-15
Attending: CLINICAL NURSE SPECIALIST
Payer: MEDICARE

## 2019-04-15 DIAGNOSIS — I77.6 CNS VASCULITIS: ICD-10-CM

## 2019-04-15 PROCEDURE — 70553 MRI BRAIN DEMYELINATING W/ WO CONTRAST: ICD-10-PCS | Mod: 26,,, | Performed by: RADIOLOGY

## 2019-04-15 PROCEDURE — 70553 MRI BRAIN STEM W/O & W/DYE: CPT | Mod: 26,,, | Performed by: RADIOLOGY

## 2019-04-15 PROCEDURE — 70553 MRI BRAIN STEM W/O & W/DYE: CPT | Mod: TC

## 2019-04-15 PROCEDURE — 25500020 PHARM REV CODE 255: Performed by: CLINICAL NURSE SPECIALIST

## 2019-04-15 PROCEDURE — A9585 GADOBUTROL INJECTION: HCPCS | Performed by: CLINICAL NURSE SPECIALIST

## 2019-04-15 RX ORDER — GADOBUTROL 604.72 MG/ML
10 INJECTION INTRAVENOUS
Status: COMPLETED | OUTPATIENT
Start: 2019-04-15 | End: 2019-04-15

## 2019-04-15 RX ADMIN — GADOBUTROL 20 ML: 604.72 INJECTION INTRAVENOUS at 10:04

## 2019-04-18 ENCOUNTER — OFFICE VISIT (OUTPATIENT)
Dept: OPHTHALMOLOGY | Facility: CLINIC | Age: 59
End: 2019-04-18
Payer: MEDICARE

## 2019-04-18 DIAGNOSIS — E11.3493 TYPE 2 DIABETES MELLITUS WITH BOTH EYES AFFECTED BY SEVERE NONPROLIFERATIVE RETINOPATHY WITHOUT MACULAR EDEMA, WITH LONG-TERM CURRENT USE OF INSULIN: Primary | ICD-10-CM

## 2019-04-18 DIAGNOSIS — H47.20 OPTIC ATROPHY, RIGHT EYE: ICD-10-CM

## 2019-04-18 DIAGNOSIS — Z79.4 TYPE 2 DIABETES MELLITUS WITH BOTH EYES AFFECTED BY SEVERE NONPROLIFERATIVE RETINOPATHY WITHOUT MACULAR EDEMA, WITH LONG-TERM CURRENT USE OF INSULIN: Primary | ICD-10-CM

## 2019-04-18 DIAGNOSIS — I77.6 CNS VASCULITIS: ICD-10-CM

## 2019-04-18 PROCEDURE — 92012 PR EYE EXAM, EST PATIENT,INTERMED: ICD-10-PCS | Mod: S$GLB,,, | Performed by: OPHTHALMOLOGY

## 2019-04-18 PROCEDURE — 92226 PR SPECIAL EYE EXAM, SUBSEQUENT: CPT | Mod: RT,S$GLB,, | Performed by: OPHTHALMOLOGY

## 2019-04-18 PROCEDURE — 92134 POSTERIOR SEGMENT OCT RETINA (OCULAR COHERENCE TOMOGRAPHY)-BOTH EYES: ICD-10-PCS | Mod: S$GLB,,, | Performed by: OPHTHALMOLOGY

## 2019-04-18 PROCEDURE — 92012 INTRM OPH EXAM EST PATIENT: CPT | Mod: S$GLB,,, | Performed by: OPHTHALMOLOGY

## 2019-04-18 PROCEDURE — 99999 PR PBB SHADOW E&M-EST. PATIENT-LVL III: CPT | Mod: PBBFAC,,, | Performed by: OPHTHALMOLOGY

## 2019-04-18 PROCEDURE — 99999 PR PBB SHADOW E&M-EST. PATIENT-LVL III: ICD-10-PCS | Mod: PBBFAC,,, | Performed by: OPHTHALMOLOGY

## 2019-04-18 PROCEDURE — 92226 PR SPECIAL EYE EXAM, SUBSEQUENT: ICD-10-PCS | Mod: LT,S$GLB,, | Performed by: OPHTHALMOLOGY

## 2019-04-18 PROCEDURE — 92134 CPTRZ OPH DX IMG PST SGM RTA: CPT | Mod: S$GLB,,, | Performed by: OPHTHALMOLOGY

## 2019-04-18 NOTE — PROGRESS NOTES
HPI     DLS 2/6/19---Dr Willson   DLS 1/18/19---Dr Mixon     3 mons ck for DR/poor vision    Pt has no VA complaints, vision is stable OU.  Functioning well with   current vision.  -eye pain   -flashes or floaters        last night     POhx   1. DM W NP RETINOPATHY OU      S/P Laser PRP OS 11/05/18      S/P PRP OS    2.CATARACT OU     Last edited by Isael Mixon MD on 4/19/2019  9:09 AM. (History)            Assessment /Plan     For exam results, see Encounter Report.    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  No retinal vasculitis    Diabetic Retinopathy discussed in detail, all questions answered  Stressed importance of good BS/BP/Chol Control  RTC 4 months, sooner PRN

## 2019-04-22 ENCOUNTER — OFFICE VISIT (OUTPATIENT)
Dept: NEUROLOGY | Facility: CLINIC | Age: 59
End: 2019-04-22
Payer: MEDICARE

## 2019-04-22 ENCOUNTER — OFFICE VISIT (OUTPATIENT)
Dept: PSYCHIATRY | Facility: CLINIC | Age: 59
End: 2019-04-22
Payer: COMMERCIAL

## 2019-04-22 ENCOUNTER — TELEPHONE (OUTPATIENT)
Dept: FAMILY MEDICINE | Facility: CLINIC | Age: 59
End: 2019-04-22

## 2019-04-22 VITALS
SYSTOLIC BLOOD PRESSURE: 119 MMHG | DIASTOLIC BLOOD PRESSURE: 81 MMHG | WEIGHT: 218.38 LBS | HEART RATE: 101 BPM | BODY MASS INDEX: 30.57 KG/M2 | HEIGHT: 71 IN

## 2019-04-22 DIAGNOSIS — R41.0 EPISODIC CONFUSION: ICD-10-CM

## 2019-04-22 DIAGNOSIS — F03.91 DEMENTIA WITH BEHAVIORAL DISTURBANCE, UNSPECIFIED DEMENTIA TYPE: Primary | ICD-10-CM

## 2019-04-22 DIAGNOSIS — Z79.630: ICD-10-CM

## 2019-04-22 DIAGNOSIS — Z79.630 ENCOUNTER FOR LONG TERM CURRENT USE OF CYCLOPHOSPHAMIDE: ICD-10-CM

## 2019-04-22 DIAGNOSIS — Z63.8 FAMILY CONFLICT: ICD-10-CM

## 2019-04-22 DIAGNOSIS — R45.3 APATHY: ICD-10-CM

## 2019-04-22 DIAGNOSIS — D84.9 ACQUIRED IMMUNOCOMPROMISED STATE: ICD-10-CM

## 2019-04-22 DIAGNOSIS — F32.A DEPRESSIVE DISORDER: Primary | ICD-10-CM

## 2019-04-22 DIAGNOSIS — R53.83 FATIGUE, UNSPECIFIED TYPE: ICD-10-CM

## 2019-04-22 DIAGNOSIS — I77.6 CNS VASCULITIS: ICD-10-CM

## 2019-04-22 PROCEDURE — 99499 RISK ADDL DX/OHS AUDIT: ICD-10-PCS | Mod: S$GLB,,, | Performed by: PSYCHIATRY & NEUROLOGY

## 2019-04-22 PROCEDURE — 90847 PR FAMILY PSYCHOTHERAPY W/ PT, 50 MIN: ICD-10-PCS | Mod: S$GLB,,, | Performed by: SOCIAL WORKER

## 2019-04-22 PROCEDURE — 99999 PR PBB SHADOW E&M-EST. PATIENT-LVL III: ICD-10-PCS | Mod: PBBFAC,,, | Performed by: PSYCHIATRY & NEUROLOGY

## 2019-04-22 PROCEDURE — 3074F SYST BP LT 130 MM HG: CPT | Mod: CPTII,S$GLB,, | Performed by: PSYCHIATRY & NEUROLOGY

## 2019-04-22 PROCEDURE — 3079F PR MOST RECENT DIASTOLIC BLOOD PRESSURE 80-89 MM HG: ICD-10-PCS | Mod: CPTII,S$GLB,, | Performed by: PSYCHIATRY & NEUROLOGY

## 2019-04-22 PROCEDURE — 99215 OFFICE O/P EST HI 40 MIN: CPT | Mod: S$GLB,,, | Performed by: PSYCHIATRY & NEUROLOGY

## 2019-04-22 PROCEDURE — 3074F PR MOST RECENT SYSTOLIC BLOOD PRESSURE < 130 MM HG: ICD-10-PCS | Mod: CPTII,S$GLB,, | Performed by: PSYCHIATRY & NEUROLOGY

## 2019-04-22 PROCEDURE — 3008F PR BODY MASS INDEX (BMI) DOCUMENTED: ICD-10-PCS | Mod: CPTII,S$GLB,, | Performed by: PSYCHIATRY & NEUROLOGY

## 2019-04-22 PROCEDURE — 99499 UNLISTED E&M SERVICE: CPT | Mod: S$GLB,,, | Performed by: PSYCHIATRY & NEUROLOGY

## 2019-04-22 PROCEDURE — 99999 PR PBB SHADOW E&M-EST. PATIENT-LVL III: CPT | Mod: PBBFAC,,, | Performed by: PSYCHIATRY & NEUROLOGY

## 2019-04-22 PROCEDURE — 90847 FAMILY PSYTX W/PT 50 MIN: CPT | Mod: S$GLB,,, | Performed by: SOCIAL WORKER

## 2019-04-22 PROCEDURE — 99215 PR OFFICE/OUTPT VISIT, EST, LEVL V, 40-54 MIN: ICD-10-PCS | Mod: S$GLB,,, | Performed by: PSYCHIATRY & NEUROLOGY

## 2019-04-22 PROCEDURE — 3008F BODY MASS INDEX DOCD: CPT | Mod: CPTII,S$GLB,, | Performed by: PSYCHIATRY & NEUROLOGY

## 2019-04-22 PROCEDURE — 3079F DIAST BP 80-89 MM HG: CPT | Mod: CPTII,S$GLB,, | Performed by: PSYCHIATRY & NEUROLOGY

## 2019-04-22 RX ORDER — METHYLPHENIDATE HYDROCHLORIDE 5 MG/1
5 TABLET ORAL DAILY
Qty: 90 TABLET | Refills: 0 | Status: SHIPPED | OUTPATIENT
Start: 2019-04-22 | End: 2019-09-09 | Stop reason: DRUGHIGH

## 2019-04-22 RX ORDER — QUETIAPINE FUMARATE 25 MG/1
25 TABLET, FILM COATED ORAL NIGHTLY
Qty: 30 TABLET | Refills: 11 | Status: SHIPPED | OUTPATIENT
Start: 2019-04-22 | End: 2021-02-01 | Stop reason: SDUPTHER

## 2019-04-22 RX ORDER — PEN NEEDLE, DIABETIC 30 GX3/16"
NEEDLE, DISPOSABLE MISCELLANEOUS
Qty: 400 EACH | Refills: 3 | Status: SHIPPED | OUTPATIENT
Start: 2019-04-22 | End: 2020-07-22

## 2019-04-22 SDOH — SOCIAL DETERMINANTS OF HEALTH (SDOH): OTHER SPECIFIED PROBLEMS RELATED TO PRIMARY SUPPORT GROUP: Z63.8

## 2019-04-22 NOTE — TELEPHONE ENCOUNTER
"Pharmacist Carmita is  requesting that pen needle, diabetic (BD ULTRA-FINE ANA PEN NEEDLE) 32 gauge x 5/32" Ndle, be changed to the Relion brand.                                           "

## 2019-04-22 NOTE — PROGRESS NOTES
Subjective:       Patient ID: Bessy Nunez is a 59 y.o. male who presents today for a routine clinic visit for CNS vasculitis; Pt is accompanied by his wife;     HPI:  · DMT: Cytoxan monthly; sees Dr. Goldstein  · On 150mg/day of Zolfot; his wife does not think he is depressed;   · He continues to have some aggression;   · Is seeing Sharda Tyson regularly; working with pt's family re: long term care decision making and support;      SOCIAL HISTORY  Social History     Tobacco Use    Smoking status: Never Smoker    Smokeless tobacco: Never Used   Substance Use Topics    Alcohol use: No     Alcohol/week: 0.0 oz    Drug use: No     Living arrangements - the patient lives with their family.  Employment : SSDI, just received Medicare this month;            Objective:        25 foot timed walk: 7.75s without assist; was 6.5d last visit; no assist;     Neurologic Exam      Alert; flat affect; thinks it is April 28th (it's the 22nd); knows he's in my office; knows the floor; thinks it's 2018;  He recalls that yesterday was Easter;  He denies depression;        Extraocular movements are intact.   Facial movements are normal. He has normal strength in upper and lower extremities. Rapid sequential movements are normal in the upper extremities.   Reflexes are 2+ and symmetric throughout.    Romberg is positive.   Finger to nose coordination is normal.       Imaging:     Results for orders placed during the hospital encounter of 04/15/19   MRI Brain Demyelinating W W/O Contrast    Impression Remote microvascular ischemic changes supratentorial and infratentorial with areas suggesting amyloid angiopathy.    Incidental enhancing tiny venous angioma right parietal lobe.    findings in the cerebral white matter which are not typical for multiple sclerosis.  No new or enhancing lesions to suggest active disease.      Electronically signed by: Wing Galo MD  Date:    04/15/2019  Time:    11:32     No results found  for this or any previous visit.  No results found for this or any previous visit.      Labs:     Lab Results   Component Value Date    UAYBQNXW48AX 47 03/04/2019    KZTWAJDW46TJ 36 08/15/2018    NLHRFERY29YK 11 (L) 05/17/2017     No results found for: JCVINDEX, JCVANTIBODY  Lab Results   Component Value Date    JF0PMPAQ 87.9 (H) 08/15/2018    ABSOLUTECD3 1271 08/15/2018    XP4BJFWW 22.6 08/15/2018    ABSOLUTECD8 327 08/15/2018    NS4ZUZEY 61.7 (H) 08/15/2018    ABSOLUTECD4 892 08/15/2018    LABCD48 2.73 08/15/2018     Lab Results   Component Value Date    WBC 8.78 04/26/2019    RBC 3.91 (L) 04/26/2019    HGB 10.9 (L) 04/26/2019    HCT 32.4 (L) 04/26/2019    MCV 83 04/26/2019    MCH 27.9 04/26/2019    MCHC 33.6 04/26/2019    RDW 14.9 (H) 04/26/2019     04/26/2019    MPV 9.3 04/26/2019    GRAN 6.7 04/26/2019    GRAN 76.2 (H) 04/26/2019    LYMPH 1.0 04/26/2019    LYMPH 11.5 (L) 04/26/2019    MONO 0.8 04/26/2019    MONO 9.0 04/26/2019    EOS 0.1 04/26/2019    BASO 0.05 04/26/2019    EOSINOPHIL 1.6 04/26/2019    BASOPHIL 0.6 04/26/2019     Sodium   Date Value Ref Range Status   04/26/2019 139 136 - 145 mmol/L Final     Potassium   Date Value Ref Range Status   04/26/2019 3.8 3.5 - 5.1 mmol/L Final     Chloride   Date Value Ref Range Status   04/26/2019 104 95 - 110 mmol/L Final     CO2   Date Value Ref Range Status   04/26/2019 24 23 - 29 mmol/L Final     Glucose   Date Value Ref Range Status   04/26/2019 150 (H) 70 - 110 mg/dL Final     BUN, Bld   Date Value Ref Range Status   04/26/2019 21 8 - 23 mg/dL Final     Creatinine   Date Value Ref Range Status   04/26/2019 1.2 0.5 - 1.4 mg/dL Final     Calcium   Date Value Ref Range Status   04/26/2019 9.6 8.7 - 10.5 mg/dL Final     Total Protein   Date Value Ref Range Status   04/26/2019 7.2 6.0 - 8.4 g/dL Final     Albumin   Date Value Ref Range Status   04/26/2019 3.8 3.5 - 5.2 g/dL Final   10/11/2013 4.3 3.6 - 5.1 g/dL Final     Comment:     @ Test Performed  By:  [employer] [facility] [place]  Cici Estrada M.D., FCAP.,   [address] [place] [address]  [facility] #[id number]     Total Bilirubin   Date Value Ref Range Status   04/26/2019 0.6 0.1 - 1.0 mg/dL Final     Comment:     For infants and newborns, interpretation of results should be based  on gestational age, weight and in agreement with clinical  observations.  Premature Infant recommended reference ranges:  Up to 24 hours.............<8.0 mg/dL  Up to 48 hours............<12.0 mg/dL  3-5 days..................<15.0 mg/dL  6-29 days.................<15.0 mg/dL       Alkaline Phosphatase   Date Value Ref Range Status   04/26/2019 76 55 - 135 U/L Final     AST   Date Value Ref Range Status   04/26/2019 17 10 - 40 U/L Final     ALT   Date Value Ref Range Status   04/26/2019 9 (L) 10 - 44 U/L Final     Anion Gap   Date Value Ref Range Status   04/26/2019 11 8 - 16 mmol/L Final     eGFR if    Date Value Ref Range Status   04/26/2019 >60.0 >60 mL/min/1.73 m^2 Final     eGFR if non    Date Value Ref Range Status   04/26/2019 >60.0 >60 mL/min/1.73 m^2 Final     Comment:     Calculation used to obtain the estimated glomerular filtration  rate (eGFR) is the CKD-EPI equation.            Diagnosis/Assessment/Plan:       Problem List Items Addressed This Visit        1 - High    CNS vasculitis failed imuran; on cytoxan    Overview     Failed Cytoxan hiatus and transition to Imuran Sept/Oct 2018;   Now much improved back on Cytoxan;   Refer to Dr. Daley of rheumatology for second opinion on management         Relevant Orders    Ambulatory Referral to Rheumatology       2     Acquired immunocompromised state    Overview     cyclophosphamide         Dementia with behavioral disturbance - Primary    Relevant Medications    QUEtiapine (SEROQUEL) 25 MG Tab       3     Episodic confusion    Overview     Improved now that back on cyclophosphamide            4      Encounter for long term current use of cyclophosphamide      Other Visit Diagnoses     Apathy        Fatigue, unspecified type        Encounter for long term current cyclophosphamide therapy        Relevant Orders    Ambulatory Referral to Rheumatology        F/u Beti Boswell CNS 2 mo  Over 50% of this 40 minute visit was spent in direct face to face counseling of the patient and his wife about his CNS vasculitis and plan for immunotherapy and symptom management

## 2019-04-26 ENCOUNTER — OFFICE VISIT (OUTPATIENT)
Dept: HEMATOLOGY/ONCOLOGY | Facility: CLINIC | Age: 59
End: 2019-04-26
Payer: MEDICARE

## 2019-04-26 ENCOUNTER — INFUSION (OUTPATIENT)
Dept: INFUSION THERAPY | Facility: HOSPITAL | Age: 59
End: 2019-04-26
Attending: INTERNAL MEDICINE
Payer: MEDICARE

## 2019-04-26 VITALS
SYSTOLIC BLOOD PRESSURE: 131 MMHG | TEMPERATURE: 98 F | RESPIRATION RATE: 20 BRPM | BODY MASS INDEX: 31.41 KG/M2 | DIASTOLIC BLOOD PRESSURE: 90 MMHG | HEIGHT: 70 IN | SYSTOLIC BLOOD PRESSURE: 148 MMHG | DIASTOLIC BLOOD PRESSURE: 79 MMHG | OXYGEN SATURATION: 97 % | TEMPERATURE: 99 F | HEART RATE: 94 BPM | WEIGHT: 219.38 LBS | HEART RATE: 91 BPM

## 2019-04-26 DIAGNOSIS — Z51.11 ENCOUNTER FOR CHEMOTHERAPY MANAGEMENT: ICD-10-CM

## 2019-04-26 DIAGNOSIS — T45.1X5A ANEMIA ASSOCIATED WITH CHEMOTHERAPY: ICD-10-CM

## 2019-04-26 DIAGNOSIS — I77.6 CNS VASCULITIS: Primary | ICD-10-CM

## 2019-04-26 DIAGNOSIS — D64.81 ANEMIA ASSOCIATED WITH CHEMOTHERAPY: ICD-10-CM

## 2019-04-26 PROCEDURE — 63600175 PHARM REV CODE 636 W HCPCS: Performed by: INTERNAL MEDICINE

## 2019-04-26 PROCEDURE — 3008F BODY MASS INDEX DOCD: CPT | Mod: CPTII,S$GLB,, | Performed by: NURSE PRACTITIONER

## 2019-04-26 PROCEDURE — 3078F PR MOST RECENT DIASTOLIC BLOOD PRESSURE < 80 MM HG: ICD-10-PCS | Mod: CPTII,S$GLB,, | Performed by: NURSE PRACTITIONER

## 2019-04-26 PROCEDURE — 25000003 PHARM REV CODE 250: Performed by: INTERNAL MEDICINE

## 2019-04-26 PROCEDURE — 3078F DIAST BP <80 MM HG: CPT | Mod: CPTII,S$GLB,, | Performed by: NURSE PRACTITIONER

## 2019-04-26 PROCEDURE — 99214 OFFICE O/P EST MOD 30 MIN: CPT | Mod: S$GLB,,, | Performed by: NURSE PRACTITIONER

## 2019-04-26 PROCEDURE — 96367 TX/PROPH/DG ADDL SEQ IV INF: CPT

## 2019-04-26 PROCEDURE — 96413 CHEMO IV INFUSION 1 HR: CPT

## 2019-04-26 PROCEDURE — 99499 UNLISTED E&M SERVICE: CPT | Mod: S$GLB,,, | Performed by: NURSE PRACTITIONER

## 2019-04-26 PROCEDURE — 99999 PR PBB SHADOW E&M-EST. PATIENT-LVL V: CPT | Mod: PBBFAC,,, | Performed by: NURSE PRACTITIONER

## 2019-04-26 PROCEDURE — 3074F PR MOST RECENT SYSTOLIC BLOOD PRESSURE < 130 MM HG: ICD-10-PCS | Mod: CPTII,S$GLB,, | Performed by: NURSE PRACTITIONER

## 2019-04-26 PROCEDURE — 99999 PR PBB SHADOW E&M-EST. PATIENT-LVL V: ICD-10-PCS | Mod: PBBFAC,,, | Performed by: NURSE PRACTITIONER

## 2019-04-26 PROCEDURE — 3008F PR BODY MASS INDEX (BMI) DOCUMENTED: ICD-10-PCS | Mod: CPTII,S$GLB,, | Performed by: NURSE PRACTITIONER

## 2019-04-26 PROCEDURE — 99214 PR OFFICE/OUTPT VISIT, EST, LEVL IV, 30-39 MIN: ICD-10-PCS | Mod: S$GLB,,, | Performed by: NURSE PRACTITIONER

## 2019-04-26 PROCEDURE — 99499 RISK ADDL DX/OHS AUDIT: ICD-10-PCS | Mod: S$GLB,,, | Performed by: NURSE PRACTITIONER

## 2019-04-26 PROCEDURE — 96375 TX/PRO/DX INJ NEW DRUG ADDON: CPT

## 2019-04-26 PROCEDURE — 3074F SYST BP LT 130 MM HG: CPT | Mod: CPTII,S$GLB,, | Performed by: NURSE PRACTITIONER

## 2019-04-26 RX ORDER — HEPARIN 100 UNIT/ML
500 SYRINGE INTRAVENOUS
Status: DISCONTINUED | OUTPATIENT
Start: 2019-04-26 | End: 2019-04-26 | Stop reason: HOSPADM

## 2019-04-26 RX ORDER — HEPARIN 100 UNIT/ML
500 SYRINGE INTRAVENOUS
Status: CANCELLED | OUTPATIENT
Start: 2019-04-26

## 2019-04-26 RX ORDER — SODIUM CHLORIDE 0.9 % (FLUSH) 0.9 %
10 SYRINGE (ML) INJECTION
Status: CANCELLED | OUTPATIENT
Start: 2019-04-26

## 2019-04-26 RX ORDER — SODIUM CHLORIDE 0.9 % (FLUSH) 0.9 %
10 SYRINGE (ML) INJECTION
Status: DISCONTINUED | OUTPATIENT
Start: 2019-04-26 | End: 2019-04-26 | Stop reason: HOSPADM

## 2019-04-26 RX ADMIN — MESNA 1500 MG: 100 INJECTION, SOLUTION INTRAVENOUS at 12:04

## 2019-04-26 RX ADMIN — CYCLOPHOSPHAMIDE 1500 MG: 1 INJECTION, POWDER, FOR SOLUTION INTRAVENOUS; ORAL at 12:04

## 2019-04-26 RX ADMIN — DEXAMETHASONE SODIUM PHOSPHATE: 4 INJECTION, SOLUTION INTRAMUSCULAR; INTRAVENOUS at 11:04

## 2019-04-26 RX ADMIN — HEPARIN 500 UNITS: 100 SYRINGE at 01:04

## 2019-04-26 NOTE — PLAN OF CARE
Problem: Adult Inpatient Plan of Care  Goal: Plan of Care Review  Outcome: Outcome(s) achieved Date Met: 04/26/19  Patient tolerated Cytoxan infusion with no complications. Also, received mesna per treatment plan regimen. VS stable. Labs reviewed. Port flushed, heparin locked, and de-accessed. Patient has no c/o pain or discomfort. AVS declined. Discharged home.

## 2019-04-26 NOTE — Clinical Note
- cbc, cmp, lab only in 2 weeks at [facility] ([facility])- same labs, NP appt, & cytoxan infusion in 4 weeks

## 2019-04-26 NOTE — PROGRESS NOTES
SECTION OF HEMATOLOGY AND BONE MARROW TRANSPLANT    04/26/2019  Referred by:  Dr. Love  Referred for: Cytoxan    HISTORY OF PRESENT ILLNESS:   Presents today to clinic alone for another cycle of cytoxan chemotherpay for CNS Vasculitis. This would be dose (#21).  Neurologic symptoms worsened when Cytoxan was stopped in 2018. Neurological symptoms have improved overall since restarting Cytoxan however still waxes and wanes.  Plan to continue for now per neurology, he was seen for follow-up 4/22/2019.  He has largely tolerated therapy with no major complications other than brief periods of neutropenia. Today he states he just feels tired from waking up early but generally has good energy, sleeps well. He reports eating well with no nausea or diarrhea. Denies fevers or recent infections. Denies HA, dizziness or vision changes.        PAST MEDICAL HISTORY:   Past Medical History:   Diagnosis Date    Amblyopia     Cataract     CNS vasculitis 6/2/2017    Follows with Dr. Love By mri.  Several active lesions, many old. 5/13: MRA brain/neck, MRI brain w/wo contrast show no vascular occlusion, multiple foci of hyperintensity in deep cerebral periventricular white matter in pattern of demyelinating process.  5/17: MRI spine performed, no spinal cord lesions. Hospitalization 6/2017. EEG was performed negative for seizures.  Repeat MRI showing new lesion, question whether this was demyelination versus CVA. 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 Findings compatible with a small acute lacunar infarct adjacent to the left frontal horn.  Nonspecific enhancement just inferior to this region and extending into the left basal ganglia, as well as within the inferior aspect of the left cerebellum.  No evidence  of a focal mass. 2.  Extensive increased signal intensity involving the periventricular white matter compatible with demyelinating disease versus vasculitis. 3.  Clinical correlation and followup recommended. 4.  This reportedly flattened in the EPIC and the corpus callosum medical record system.    Mixed hyperlipidemia 11/9/2012    NAFLD (nonalcoholic fatty liver disease) 10/11/2013    CHASE (nonalcoholic steatohepatitis)     Obesity     Stroke     Type 2 diabetes mellitus with diabetic polyneuropathy, with long-term current use of insulin 5/14/2017    Type 2 diabetes mellitus with hyperglycemia, with long-term current use of insulin 10/11/2013       PAST SURGICAL HISTORY:   Past Surgical History:   Procedure Laterality Date    BIOPSY LIVER AND ULTRASOUND N/A 6/9/2015    Performed by [facility] Provider at [facility] OR 2ND FLR    COLONOSCOPY N/A 2/21/2014    Performed by [facility] Aceves [facility] at [facility] ENDO    ESOPHAGOGASTRODUODENOSCOPY (EGD) N/A 5/29/2017    Performed by Hai Carter MD at [facility] ENDO (2ND FLR)    TXQYDEJAH-MRLP-S-CATH N/A 12/7/2018    Performed by [facility] Provider at [facility] OR 2ND FLR    SKIN CANCER EXCISION         PAST SOCIAL HISTORY:   reports that he has never smoked. He has never used smokeless tobacco. He reports that he does not drink alcohol or use drugs.    FAMILY HISTORY:  Family History   Problem Relation Age of Onset    Heart disease Mother     Hypertension Mother     Heart failure Mother     Cataracts Mother     Cancer Father         lung    Diabetes Maternal Aunt     Stroke Sister     Rheum arthritis Paternal Grandmother     Amblyopia Neg Hx     Blindness Neg Hx     Glaucoma Neg Hx     Macular degeneration Neg Hx     Retinal detachment Neg Hx     Strabismus Neg Hx        CURRENT MEDICATIONS:   Current Outpatient Medications   Medication Sig    alclomethasone (ACLOVATE) 0.05 % cream     alendronate (FOSAMAX) 70 MG tablet Take 1 tablet (70 mg total) by  "mouth every 7 days.    aspirin (ECOTRIN) 81 MG EC tablet Take 81 mg by mouth once daily.    atenolol (TENORMIN) 50 MG tablet Take 1 tablet (50 mg total) by mouth once daily.    atorvastatin (LIPITOR) 40 MG tablet Take 1 tablet (40 mg total) by mouth once daily.    blood sugar diagnostic (TRUE METRIX GLUCOSE TEST STRIP) Strp Test blood sugar four times a day    diltiaZEM (DILACOR XR) 240 MG CDCR Take 1 capsule (240 mg total) by mouth once daily.    flash glucose scanning reader (VSS MonitoringSTYLE ARELY 14 DAY READER) Misc Glucose testing fasting and prior to meals    flash glucose sensor (FREESTYLE ARELY 14 DAY SENSOR) Kit Glucose checking 4 times a day    insulin (BASAGLAR KWIKPEN U-100 INSULIN) glargine 100 units/mL (3mL) SubQ pen Inject 40 Units into the skin 2 (two) times daily. Up to 60 BID with cytoxan    insulin aspart U-100 (NOVOLOG) 100 unit/mL InPn pen Inject 20 Units into the skin 3 (three) times daily with meals. (Patient taking differently: Inject 20 Units into the skin 4 (four) times daily. )    insulin syringe-needle U-100 (INSULIN SYRINGE) 1/2 mL 30 gauge x 5/16 Syrg Use 3x/day    irbesartan (AVAPRO) 300 MG tablet Take 1 tablet (300 mg total) by mouth every evening.    ketoconazole (NIZORAL) 2 % cream     lancets 30 gauge Misc Test blood sugar four times a day    liraglutide 0.6 mg/0.1 mL, 18 mg/3 mL, subq PNIJ (VICTOZA 3-TOMASZ) 0.6 mg/0.1 mL (18 mg/3 mL) PnIj Inject 1.8 mg into the skin once daily.    metFORMIN (GLUCOPHAGE-XR) 500 MG 24 hr tablet Take 2 tablets (1,000 mg total) by mouth 2 (two) times daily with meals.    methylphenidate HCl (RITALIN) 5 MG tablet Take 1 tablet (5 mg total) by mouth once daily.    omega-3 fatty acids-vitamin E (FISH OIL) 1,000 mg Cap Take 1 capsule by mouth 2 (two) times daily.    pen needle, diabetic (BD ULTRA-FINE ANA PEN NEEDLE) 32 gauge x 5/32" Ndle 4x daily insulin pen needle, relion    QUEtiapine (SEROQUEL) 25 MG Tab Take 1 tablet (25 mg total) by mouth " every evening.    sertraline (ZOLOFT) 100 MG tablet Take 1 tab and half (total 150mg) daily    vitamin D 1000 units Tab Take 5 tablets (5,000 Units total) by mouth once daily.     No current facility-administered medications for this visit.      Facility-Administered Medications Ordered in Other Visits   Medication    alteplase injection 2 mg    cyclophosphamide (CYTOXAN) 1,500 mg in sodium chloride 0.9% 250 mL chemo infusion    heparin, porcine (PF) 100 unit/mL injection flush 500 Units    sodium chloride 0.9% flush 10 mL     ALLERGIES:   Review of patient's allergies indicates:  No Known Allergies    REVIEW OF SYSTEMS:   General ROS: Positive for fatigue.   Psychological ROS: Confusion much improved.   Ophthalmic ROS: negative  ENT ROS: negative  Allergy and Immunology ROS: negative  Hematological and Lymphatic ROS: negative  Endocrine ROS: negative  Respiratory ROS: negative  Cardiovascular ROS: negative  Gastrointestinal ROS: negative  Genito-Urinary ROS: negative  Musculoskeletal ROS: negative  Neurological ROS: see HPI  Dermatological ROS: negative    PHYSICAL EXAM:   Vitals:    04/26/19 1112   BP: (Abnormal) 148/90   Pulse: 91   Temp: 98.6 °F (37 °C)       General - well developed, well nourished; much more interactive today. A+O x3  Head & Face - no sinus tenderness  Eyes - normal conjunctivae and lids   ENT - normal external auditory canals, no mouth sores  Chest and Lung - normal respiratory effort, clear to auscultation bilaterally   Cardiovascular - RRR with no MGR, normal S1 and S2; no pedal edema  Abdomen -  soft, nontender, no palpable hepatomegaly or splenomegaly  Lymph - no palpable lymphadenopathy  Extremities - unremarkable nails and digits  Heme - no bruising, petechiae, pallor  Skin - no rashes or lesions   Psych - Normal mood and affect. No confusion noted.    ECOG Performance Status: (foot note - ECOG PS provided by Eastern Cooperative Oncology Group) 1 - Symptomatic but completely  ambulatory    Karnofsky Performance Score:  80%- Normal Activity with Effort: Some Symptoms of Disease  DATA:   Lab Results   Component Value Date    WBC 8.78 04/26/2019    HGB 10.9 (L) 04/26/2019    HCT 32.4 (L) 04/26/2019    MCV 83 04/26/2019     04/26/2019     Gran # (ANC)   Date Value Ref Range Status   04/26/2019 6.7 1.8 - 7.7 K/uL Final     Gran%   Date Value Ref Range Status   04/26/2019 76.2 (H) 38.0 - 73.0 % Final     CMP  Sodium   Date Value Ref Range Status   04/26/2019 139 136 - 145 mmol/L Final     Potassium   Date Value Ref Range Status   04/26/2019 3.8 3.5 - 5.1 mmol/L Final     Chloride   Date Value Ref Range Status   04/26/2019 104 95 - 110 mmol/L Final     CO2   Date Value Ref Range Status   04/26/2019 24 23 - 29 mmol/L Final     Glucose   Date Value Ref Range Status   04/26/2019 150 (H) 70 - 110 mg/dL Final     BUN, Bld   Date Value Ref Range Status   04/26/2019 21 8 - 23 mg/dL Final     Creatinine   Date Value Ref Range Status   04/26/2019 1.2 0.5 - 1.4 mg/dL Final     Calcium   Date Value Ref Range Status   04/26/2019 9.6 8.7 - 10.5 mg/dL Final     Total Protein   Date Value Ref Range Status   04/26/2019 7.2 6.0 - 8.4 g/dL Final     Albumin   Date Value Ref Range Status   04/26/2019 3.8 3.5 - 5.2 g/dL Final   10/11/2013 4.3 3.6 - 5.1 g/dL Final     Comment:     @ Test Performed By:  Programeter Chuy Estrada M.D., [place],   [address] [place] [address]  [facility] #[id number]     Total Bilirubin   Date Value Ref Range Status   04/26/2019 0.6 0.1 - 1.0 mg/dL Final     Comment:     For infants and newborns, interpretation of results should be based  on gestational age, weight and in agreement with clinical  observations.  Premature Infant recommended reference ranges:  Up to 24 hours.............<8.0 mg/dL  Up to 48 hours............<12.0 mg/dL  3-5 days..................<15.0 mg/dL  6-29 days.................<15.0 mg/dL       Alkaline  Phosphatase   Date Value Ref Range Status   04/26/2019 76 55 - 135 U/L Final     AST   Date Value Ref Range Status   04/26/2019 17 10 - 40 U/L Final     ALT   Date Value Ref Range Status   04/26/2019 9 (L) 10 - 44 U/L Final     Anion Gap   Date Value Ref Range Status   04/26/2019 11 8 - 16 mmol/L Final     eGFR if    Date Value Ref Range Status   04/26/2019 >60.0 >60 mL/min/1.73 m^2 Final     eGFR if non    Date Value Ref Range Status   04/26/2019 >60.0 >60 mL/min/1.73 m^2 Final     Comment:     Calculation used to obtain the estimated glomerular filtration  rate (eGFR) is the CKD-EPI equation.          ASSESSMENT AND PLAN:   Encounter Diagnoses   Name Primary?    CNS vasculitis Yes    Encounter for chemotherapy management     Anemia associated with chemotherapy        CNS vasculitis  -resume CTX per neuro recs  -proceed with dose #21 Cytoxan was decreased to 600 mg/m2 (due to CrCl) with mesna support for CNS vasculitis, protocol per Dr. Love; improved symptoms since initiation   -willl continue per Neurology, failed Cytoxan hiatus and transition to Imuran Sept/Oct 2018; now much improved back on Cytoxan per Neurology visit 4/22/19   -No contraindication to proceed with CTX today    Cytopenias- Anemia  -mild anemia 2/2 chemotherapy, hgb stable at 10.9 today, WBC and platelet count wnl  -Transfuse for Hgb<7 and Plt <10k, no indication to transfuse today       F/U:  - cbc, cmp, lab only in 2 weeks at [facility])  - same labs, NP appt, & cytoxan infusion in 4 weeks    Carmel Montero NP  Hematology/BMT

## 2019-04-29 DIAGNOSIS — E11.65 UNCONTROLLED TYPE 2 DIABETES MELLITUS WITH HYPERGLYCEMIA: ICD-10-CM

## 2019-04-29 RX ORDER — FLASH GLUCOSE SCANNING READER
EACH MISCELLANEOUS
Qty: 1 EACH | Refills: 0 | Status: SHIPPED | OUTPATIENT
Start: 2019-04-29

## 2019-04-30 ENCOUNTER — OFFICE VISIT (OUTPATIENT)
Dept: PODIATRY | Facility: CLINIC | Age: 59
End: 2019-04-30
Payer: MEDICARE

## 2019-04-30 VITALS — HEIGHT: 70 IN | BODY MASS INDEX: 31.35 KG/M2 | WEIGHT: 219 LBS

## 2019-04-30 DIAGNOSIS — Z79.4 TYPE 2 DIABETES MELLITUS WITH DIABETIC POLYNEUROPATHY, WITH LONG-TERM CURRENT USE OF INSULIN: Primary | ICD-10-CM

## 2019-04-30 DIAGNOSIS — B35.1 ONYCHOMYCOSIS DUE TO DERMATOPHYTE: ICD-10-CM

## 2019-04-30 DIAGNOSIS — E11.42 TYPE 2 DIABETES MELLITUS WITH DIABETIC POLYNEUROPATHY, WITH LONG-TERM CURRENT USE OF INSULIN: Primary | ICD-10-CM

## 2019-04-30 PROBLEM — F03.918 DEMENTIA WITH BEHAVIORAL DISTURBANCE: Status: ACTIVE | Noted: 2019-04-30

## 2019-04-30 PROCEDURE — 99212 PR OFFICE/OUTPT VISIT, EST, LEVL II, 10-19 MIN: ICD-10-PCS | Mod: 25,S$GLB,, | Performed by: PODIATRIST

## 2019-04-30 PROCEDURE — 3044F PR MOST RECENT HEMOGLOBIN A1C LEVEL <7.0%: ICD-10-PCS | Mod: CPTII,S$GLB,, | Performed by: PODIATRIST

## 2019-04-30 PROCEDURE — 99999 PR PBB SHADOW E&M-EST. PATIENT-LVL III: ICD-10-PCS | Mod: PBBFAC,,, | Performed by: PODIATRIST

## 2019-04-30 PROCEDURE — 3008F BODY MASS INDEX DOCD: CPT | Mod: CPTII,S$GLB,, | Performed by: PODIATRIST

## 2019-04-30 PROCEDURE — 11721 DEBRIDE NAIL 6 OR MORE: CPT | Mod: Q9,S$GLB,, | Performed by: PODIATRIST

## 2019-04-30 PROCEDURE — 99212 OFFICE O/P EST SF 10 MIN: CPT | Mod: 25,S$GLB,, | Performed by: PODIATRIST

## 2019-04-30 PROCEDURE — 3008F PR BODY MASS INDEX (BMI) DOCUMENTED: ICD-10-PCS | Mod: CPTII,S$GLB,, | Performed by: PODIATRIST

## 2019-04-30 PROCEDURE — 11721 PR DEBRIDEMENT OF NAILS, 6 OR MORE: ICD-10-PCS | Mod: Q9,S$GLB,, | Performed by: PODIATRIST

## 2019-04-30 PROCEDURE — 99999 PR PBB SHADOW E&M-EST. PATIENT-LVL III: CPT | Mod: PBBFAC,,, | Performed by: PODIATRIST

## 2019-04-30 PROCEDURE — 3044F HG A1C LEVEL LT 7.0%: CPT | Mod: CPTII,S$GLB,, | Performed by: PODIATRIST

## 2019-04-30 NOTE — PROGRESS NOTES
Subjective:      Patient ID: Bessy Nunez is a 59 y.o. male.    Chief Complaint: Diabetes Mellitus (PCP Dr Nova 3/28/19); Diabetic Foot Exam; and Nail Care    Bessy is a 59 y.o. male who presents to the clinic upon referral from Dr. Nova  for evaluation and treatment of diabetic feet. Bessy has a past medical history of Amblyopia, Cataract, CNS vasculitis (6/2/2017), Depressive disorder, not elsewhere classified (11/9/2012), Essential hypertension (11/9/2012), Internuclear ophthalmoplegia of left eye (5/13/2017), Lacunar stroke of left subthalamic region (9/14/2017), Mixed hyperlipidemia (11/9/2012), NAFLD (nonalcoholic fatty liver disease) (10/11/2013), CHASE (nonalcoholic steatohepatitis), Obesity, Stroke, Type 2 diabetes mellitus with diabetic polyneuropathy, with long-term current use of insulin (5/14/2017), and Type 2 diabetes mellitus with hyperglycemia, with long-term current use of insulin (10/11/2013). Presents with elongated nails that are difficult to trim.     PCP: Edgar Nova MD    Date Last Seen by PCP: 8/1/18    Current shoe gear: Tennis shoes    Hemoglobin A1C   Date Value Ref Range Status   04/01/2019 6.2 (H) 4.0 - 5.6 % Final     Comment:     ADA Screening Guidelines:  5.7-6.4%  Consistent with prediabetes  >or=6.5%  Consistent with diabetes  High levels of fetal hemoglobin interfere with the HbA1C  assay. Heterozygous hemoglobin variants (HbS, HgC, etc)do  not significantly interfere with this assay.   However, presence of multiple variants may affect accuracy.     10/15/2018 6.2 (H) 4.0 - 5.6 % Final     Comment:     ADA Screening Guidelines:  5.7-6.4%  Consistent with prediabetes  >or=6.5%  Consistent with diabetes  High levels of fetal hemoglobin interfere with the HbA1C  assay. Heterozygous hemoglobin variants (HbS, HgC, etc)do  not significantly interfere with this assay.   However, presence of multiple variants may affect accuracy.     08/01/2018 6.2 (H) 4.0 - 5.6 % Final     Comment:      ADA Screening Guidelines:  5.7-6.4%  Consistent with prediabetes  >or=6.5%  Consistent with diabetes  High levels of fetal hemoglobin interfere with the HbA1C  assay. Heterozygous hemoglobin variants (HbS, HgC, etc)do  not significantly interfere with this assay.   However, presence of multiple variants may affect accuracy.             Review of Systems   Constitution: Negative for chills, diaphoresis and fever.   Cardiovascular: Negative for claudication, cyanosis, leg swelling and syncope.   Respiratory: Negative for cough and shortness of breath.    Skin: Positive for color change and dry skin. Negative for nail changes and suspicious lesions.   Musculoskeletal: Negative for falls, joint pain, muscle cramps and muscle weakness.   Gastrointestinal: Negative for diarrhea, nausea and vomiting.   Neurological: Positive for numbness and paresthesias. Negative for disturbances in coordination, sensory change, tremors and weakness.   Psychiatric/Behavioral: Negative for altered mental status.           Objective:      Physical Exam   Constitutional: He appears well-developed. He is cooperative.   Oriented to time, place, and person.   Cardiovascular:   DP and PT pulses are palpable bilaterally. 3 sec capillary refill time and toes and feet are warm to touch proximally .  There is  hair growth on the feet and toes b/l. There is no edema b/l. No spider veins or varicosities present b/l.      Musculoskeletal:   Equinus noted b/l ankles with < 10 deg DF noted. MMT 5/5 in DF/PF/Inv/Ev resistance with no reproduction of pain in any direction. Passive range of motion of ankle and pedal joints is painless b/l.     Feet:   Right Foot:   Skin Integrity: Negative for callus or dry skin.   Left Foot:   Skin Integrity: Negative for callus or dry skin.   Lymphadenopathy:   Negative lymphadenopathy bilateral popliteal fossa and tarsal tunnel.   Neurological: He is alert.   Light touch, proprioception, and sharp/dull sensation are all  intact bilaterally. Protective threshold with the [place]-Wienstein monofilament is intact bilaterally.    Subjective paresthesias with no clearly identifiable source or trigger.      Skin:   No open lesions, lacerations or wounds noted.Interdigital spaces clean, dry and intact b/l. No erythema noted to b/l foot.    Toenails 1-5 bilaterally are elongated by 2-3 mm, thickened by 2-3 mm, discolored/yellowed, dystrophic, brittle with subungual debris.    Scaling dryness in a moccasin distribution is noted to the bilateral lower extremities.        Psychiatric: He has a normal mood and affect.             Assessment:       Encounter Diagnoses   Name Primary?    Type 2 diabetes mellitus with diabetic polyneuropathy, with long-term current use of insulin Yes    Onychomycosis due to dermatophyte          Plan:       Bessy was seen today for diabetes mellitus, diabetic foot exam and nail care.    Diagnoses and all orders for this visit:    Type 2 diabetes mellitus with diabetic polyneuropathy, with long-term current use of insulin    Onychomycosis due to dermatophyte      I counseled the patient on his conditions, their implications and medical management.      - Shoe inspection. Diabetic Foot Education. Patient reminded of the importance of good nutrition and blood sugar control to help prevent podiatric complications of diabetes. Patient instructed on proper foot hygeine. We discussed wearing proper shoe gear, daily foot inspections, never walking without protective shoe gear, never putting sharp instruments to feet, routine podiatric nail visits every year.   - With patient's permission, nails were aggressively reduced and debrided x 10 to their soft tissue attachment mechanically and with electric , removing all offending nail and debris. Patient relates relief following the procedure. He will continue to monitor the areas daily, inspect his feet, wear protective shoe gear when ambulatory, moisturizer to  maintain skin integrity and follow in this office in approximately one year sooner p.r.n.     Instructed on application of ketoconazole cream, already has prescription.       F/u one year DM foot exam sooner PRN Proc JENS Lu DPM

## 2019-04-30 NOTE — LETTER
April 30, 2019      Edgar Nova MD  [address]  [place] [address]           [facility]  [address]  [place] [address]  Phone: [phone]          Patient: Bessy Nunez   MR Number: [id number]   YOB: 1960   Date of Visit: 4/30/2019       Dear Dr. Edgar Nova:    Thank you for referring Bessy Nunez to me for evaluation. Attached you will find relevant portions of my assessment and plan of care.    If you have questions, please do not hesitate to call me. I look forward to following Bessy Nunez along with you.    Sincerely,    Sherrie Lu, NANCY    Enclosure  CC:  No Recipients    If you would like to receive this communication electronically, please contact [email or web address] or [phone] to request more information on [facility] Link access.    For providers and/or their staff who would like to refer a patient to [facility], please contact us through our one-stop-shop provider referral line, [facility], at [phone].    If you feel you have received this communication in error or would no longer like to receive these types of communications, please e-mail [email or web address]

## 2019-05-09 ENCOUNTER — OFFICE VISIT (OUTPATIENT)
Dept: PSYCHIATRY | Facility: CLINIC | Age: 59
End: 2019-05-09
Payer: COMMERCIAL

## 2019-05-09 DIAGNOSIS — F32.A DEPRESSIVE DISORDER: Primary | ICD-10-CM

## 2019-05-09 DIAGNOSIS — Z63.8 FAMILY CONFLICT: ICD-10-CM

## 2019-05-09 PROCEDURE — 90847 PR FAMILY PSYCHOTHERAPY W/ PT, 50 MIN: ICD-10-PCS | Mod: S$GLB,,, | Performed by: SOCIAL WORKER

## 2019-05-09 PROCEDURE — 90847 FAMILY PSYTX W/PT 50 MIN: CPT | Mod: S$GLB,,, | Performed by: SOCIAL WORKER

## 2019-05-09 SDOH — SOCIAL DETERMINANTS OF HEALTH (SDOH): OTHER SPECIFIED PROBLEMS RELATED TO PRIMARY SUPPORT GROUP: Z63.8

## 2019-05-09 NOTE — Clinical Note
Ritalin not helpful.  Wife doubled the dose and it was still unhelpful.  Wants to know if she can increase further.  Thanks!

## 2019-05-09 NOTE — PROGRESS NOTES
Family Psychotherapy (PhD/LCSW)    5/9/2019    Site: [facility]    Length of service: 40    Therapeutic intervention: 90097-Family therapy with patient; needed because patient requires support from family to follow through on treatment recommendations 2/2 cognitive impairment    Persons present: Bessy and wife, Bekah    Interval history:  Bessy and his wife presented to session with casual dress and adequate hygiene.  They have followed through on their plan of living between their home, Marky's home, and Bekah's sister's home.  Bekah is getting more support to care for Bessy and Bessy doesn't seem to mind the change and enjoys seeing other people.  Bekah reports some increased forgetfulness in Bessy and denies any improvement in his apathy or energy.  However, she does observed improved behavior.  Bekah continues to appear depressed, so  followed up with her about her mood and medication compliance.  She admits to depression and anxiety and plans to follow up with her psychiatrist, but doesn't want to take as many pills.  SW again shared her observations that Bekah seemed to have more energy, patience, and happiness when she was taking medications.        Target symptoms: recurrent depression, confusion, adjustment, cognitive impairments     Patient's interpersonal/verbal exchanges: 90887-Family therapy with patient     Progress toward goals: progressing slowly    Diagnosis:    F32.9 Unspecified Depressive Disorder                       F01.51 Major vascular neurocognitive disorder, probable, with behavioral disturbance (per JENS Cross PsyD)                                Z63.8 Family Conflict                Plan: family psychotherapy    Return to clinic: 3 weeks

## 2019-05-13 DIAGNOSIS — Z79.4 CONTROLLED TYPE 2 DIABETES MELLITUS WITH DIABETIC NEPHROPATHY, WITH LONG-TERM CURRENT USE OF INSULIN: ICD-10-CM

## 2019-05-13 DIAGNOSIS — E11.21 CONTROLLED TYPE 2 DIABETES MELLITUS WITH DIABETIC NEPHROPATHY, WITH LONG-TERM CURRENT USE OF INSULIN: ICD-10-CM

## 2019-05-13 DIAGNOSIS — E11.65 TYPE 2 DIABETES MELLITUS WITH HYPERGLYCEMIA, WITH LONG-TERM CURRENT USE OF INSULIN: ICD-10-CM

## 2019-05-13 DIAGNOSIS — Z79.4 TYPE 2 DIABETES MELLITUS WITH HYPERGLYCEMIA, WITH LONG-TERM CURRENT USE OF INSULIN: ICD-10-CM

## 2019-05-13 RX ORDER — LIRAGLUTIDE 6 MG/ML
INJECTION SUBCUTANEOUS
Qty: 9 SYRINGE | Refills: 1 | Status: ON HOLD | OUTPATIENT
Start: 2019-05-13 | End: 2019-12-10 | Stop reason: SDUPTHER

## 2019-05-21 ENCOUNTER — ANESTHESIA (OUTPATIENT)
Dept: ENDOSCOPY | Facility: HOSPITAL | Age: 59
End: 2019-05-21
Payer: MEDICARE

## 2019-05-21 ENCOUNTER — HOSPITAL ENCOUNTER (OUTPATIENT)
Facility: HOSPITAL | Age: 59
Discharge: HOME | End: 2019-05-21
Attending: INTERNAL MEDICINE | Admitting: INTERNAL MEDICINE
Payer: MEDICARE

## 2019-05-21 ENCOUNTER — ANESTHESIA EVENT (OUTPATIENT)
Dept: ENDOSCOPY | Facility: HOSPITAL | Age: 59
End: 2019-05-21
Payer: MEDICARE

## 2019-05-21 VITALS
DIASTOLIC BLOOD PRESSURE: 78 MMHG | HEART RATE: 82 BPM | OXYGEN SATURATION: 99 % | RESPIRATION RATE: 18 BRPM | SYSTOLIC BLOOD PRESSURE: 134 MMHG | TEMPERATURE: 98 F

## 2019-05-21 DIAGNOSIS — Z86.010 HX OF COLONIC POLYP: Chronic | ICD-10-CM

## 2019-05-21 DIAGNOSIS — I10 ESSENTIAL HYPERTENSION: ICD-10-CM

## 2019-05-21 DIAGNOSIS — R41.89 COGNITIVE DEFICITS: ICD-10-CM

## 2019-05-21 DIAGNOSIS — Z79.4 CONTROLLED TYPE 2 DIABETES MELLITUS WITH DIABETIC NEPHROPATHY, WITH LONG-TERM CURRENT USE OF INSULIN: ICD-10-CM

## 2019-05-21 DIAGNOSIS — Z79.4 TYPE 2 DIABETES MELLITUS WITH DIABETIC POLYNEUROPATHY, WITH LONG-TERM CURRENT USE OF INSULIN: ICD-10-CM

## 2019-05-21 DIAGNOSIS — I77.6 CNS VASCULITIS: ICD-10-CM

## 2019-05-21 DIAGNOSIS — Z79.630 ENCOUNTER FOR LONG TERM CURRENT USE OF CYCLOPHOSPHAMIDE: ICD-10-CM

## 2019-05-21 DIAGNOSIS — E88.1 LIPODYSTROPHY: ICD-10-CM

## 2019-05-21 DIAGNOSIS — T38.0X5A ADVERSE EFFECT OF GLUCOCORTICOIDS AND SYNTHETIC ANALOGUES, INITIAL ENCOUNTER: ICD-10-CM

## 2019-05-21 DIAGNOSIS — I95.1 ORTHOSTATIC HYPOTENSION: ICD-10-CM

## 2019-05-21 DIAGNOSIS — F33.0 MILD EPISODE OF RECURRENT MAJOR DEPRESSIVE DISORDER: Primary | ICD-10-CM

## 2019-05-21 DIAGNOSIS — I63.81 LACUNAR STROKE OF LEFT SUBTHALAMIC REGION: ICD-10-CM

## 2019-05-21 DIAGNOSIS — G93.40 ENCEPHALOPATHY: ICD-10-CM

## 2019-05-21 DIAGNOSIS — Z51.11 ENCOUNTER FOR CHEMOTHERAPY MANAGEMENT: ICD-10-CM

## 2019-05-21 DIAGNOSIS — N52.9 ERECTILE DYSFUNCTION, UNSPECIFIED ERECTILE DYSFUNCTION TYPE: ICD-10-CM

## 2019-05-21 DIAGNOSIS — E78.2 MIXED HYPERLIPIDEMIA: ICD-10-CM

## 2019-05-21 DIAGNOSIS — S32.040S CLOSED COMPRESSION FRACTURE OF FOURTH LUMBAR VERTEBRA, SEQUELA: ICD-10-CM

## 2019-05-21 DIAGNOSIS — T45.1X5A CHEMOTHERAPY INDUCED NAUSEA AND VOMITING: ICD-10-CM

## 2019-05-21 DIAGNOSIS — H47.20 OPTIC ATROPHY, RIGHT EYE: ICD-10-CM

## 2019-05-21 DIAGNOSIS — F03.91 DEMENTIA WITH BEHAVIORAL DISTURBANCE, UNSPECIFIED DEMENTIA TYPE: ICD-10-CM

## 2019-05-21 DIAGNOSIS — E66.9 OBESITY (BMI 30-39.9): ICD-10-CM

## 2019-05-21 DIAGNOSIS — Z12.11 SCREEN FOR COLON CANCER: ICD-10-CM

## 2019-05-21 DIAGNOSIS — E11.21 CONTROLLED TYPE 2 DIABETES MELLITUS WITH DIABETIC NEPHROPATHY, WITH LONG-TERM CURRENT USE OF INSULIN: ICD-10-CM

## 2019-05-21 DIAGNOSIS — R47.02 DYSPHASIA: ICD-10-CM

## 2019-05-21 DIAGNOSIS — H53.411 CENTRAL SCOTOMA, RIGHT EYE: ICD-10-CM

## 2019-05-21 DIAGNOSIS — R41.0 EPISODIC CONFUSION: ICD-10-CM

## 2019-05-21 DIAGNOSIS — M80.80XD OTHER OSTEOPOROSIS WITH CURRENT PATHOLOGICAL FRACTURE WITH ROUTINE HEALING, SUBSEQUENT ENCOUNTER: ICD-10-CM

## 2019-05-21 DIAGNOSIS — Z23 NEED FOR PNEUMOCOCCAL VACCINATION: ICD-10-CM

## 2019-05-21 DIAGNOSIS — R49.0 DYSPHONIA: ICD-10-CM

## 2019-05-21 DIAGNOSIS — R11.2 CHEMOTHERAPY INDUCED NAUSEA AND VOMITING: ICD-10-CM

## 2019-05-21 DIAGNOSIS — E11.42 TYPE 2 DIABETES MELLITUS WITH DIABETIC POLYNEUROPATHY, WITH LONG-TERM CURRENT USE OF INSULIN: ICD-10-CM

## 2019-05-21 DIAGNOSIS — D75.9 CYTOPENIA: ICD-10-CM

## 2019-05-21 DIAGNOSIS — K76.0 NAFLD (NONALCOHOLIC FATTY LIVER DISEASE): ICD-10-CM

## 2019-05-21 DIAGNOSIS — Z23 NEED FOR TDAP VACCINATION: ICD-10-CM

## 2019-05-21 DIAGNOSIS — D84.9 ACQUIRED IMMUNOCOMPROMISED STATE: ICD-10-CM

## 2019-05-21 DIAGNOSIS — Z23 NEED FOR HEPATITIS A AND B VACCINATION: ICD-10-CM

## 2019-05-21 DIAGNOSIS — G47.33 OSA (OBSTRUCTIVE SLEEP APNEA): ICD-10-CM

## 2019-05-21 DIAGNOSIS — N28.1 RENAL CYST: ICD-10-CM

## 2019-05-21 DIAGNOSIS — H53.009 AMBLYOPIA, UNSPECIFIED LATERALITY: ICD-10-CM

## 2019-05-21 DIAGNOSIS — R32 URINARY INCONTINENCE, UNSPECIFIED TYPE: ICD-10-CM

## 2019-05-21 DIAGNOSIS — Z74.09 IMPAIRED FUNCTIONAL MOBILITY, BALANCE, GAIT, AND ENDURANCE: ICD-10-CM

## 2019-05-21 DIAGNOSIS — K26.9 DUODENAL ULCER: ICD-10-CM

## 2019-05-21 DIAGNOSIS — R47.01 APHASIA: ICD-10-CM

## 2019-05-21 PROCEDURE — 45385 PR COLONOSCOPY,REMV LESN,SNARE: ICD-10-PCS | Mod: PT,,, | Performed by: INTERNAL MEDICINE

## 2019-05-21 PROCEDURE — 37000008 HC ANESTHESIA 1ST 15 MINUTES: Performed by: INTERNAL MEDICINE

## 2019-05-21 PROCEDURE — 82962 GLUCOSE BLOOD TEST: CPT | Performed by: INTERNAL MEDICINE

## 2019-05-21 PROCEDURE — D9220A PRA ANESTHESIA: ICD-10-PCS | Mod: 33,CRNA,, | Performed by: NURSE ANESTHETIST, CERTIFIED REGISTERED

## 2019-05-21 PROCEDURE — 88305 TISSUE SPECIMEN TO PATHOLOGY - SURGERY: ICD-10-PCS | Mod: 26,,, | Performed by: PATHOLOGY

## 2019-05-21 PROCEDURE — D9220A PRA ANESTHESIA: ICD-10-PCS | Mod: 33,ANES,, | Performed by: ANESTHESIOLOGY

## 2019-05-21 PROCEDURE — 25000003 PHARM REV CODE 250: Performed by: NURSE ANESTHETIST, CERTIFIED REGISTERED

## 2019-05-21 PROCEDURE — 25000003 PHARM REV CODE 250: Performed by: INTERNAL MEDICINE

## 2019-05-21 PROCEDURE — 88305 TISSUE EXAM BY PATHOLOGIST: CPT | Performed by: PATHOLOGY

## 2019-05-21 PROCEDURE — 37000009 HC ANESTHESIA EA ADD 15 MINS: Performed by: INTERNAL MEDICINE

## 2019-05-21 PROCEDURE — 27201089 HC SNARE, DISP (ANY): Performed by: INTERNAL MEDICINE

## 2019-05-21 PROCEDURE — 45385 COLONOSCOPY W/LESION REMOVAL: CPT | Mod: PT,,, | Performed by: INTERNAL MEDICINE

## 2019-05-21 PROCEDURE — D9220A PRA ANESTHESIA: Mod: 33,ANES,, | Performed by: ANESTHESIOLOGY

## 2019-05-21 PROCEDURE — 63600175 PHARM REV CODE 636 W HCPCS: Performed by: NURSE ANESTHETIST, CERTIFIED REGISTERED

## 2019-05-21 PROCEDURE — 88305 TISSUE EXAM BY PATHOLOGIST: CPT | Mod: 26,,, | Performed by: PATHOLOGY

## 2019-05-21 PROCEDURE — D9220A PRA ANESTHESIA: Mod: 33,CRNA,, | Performed by: NURSE ANESTHETIST, CERTIFIED REGISTERED

## 2019-05-21 PROCEDURE — 45385 COLONOSCOPY W/LESION REMOVAL: CPT | Performed by: INTERNAL MEDICINE

## 2019-05-21 RX ORDER — PROPOFOL 10 MG/ML
VIAL (ML) INTRAVENOUS
Status: DISCONTINUED
Start: 2019-05-21 | End: 2019-05-21 | Stop reason: HOSPADM

## 2019-05-21 RX ORDER — LIDOCAINE HCL/PF 100 MG/5ML
SYRINGE (ML) INTRAVENOUS
Status: DISCONTINUED | OUTPATIENT
Start: 2019-05-21 | End: 2019-05-21

## 2019-05-21 RX ORDER — PROPOFOL 10 MG/ML
VIAL (ML) INTRAVENOUS
Status: DISCONTINUED | OUTPATIENT
Start: 2019-05-21 | End: 2019-05-21

## 2019-05-21 RX ORDER — SODIUM CHLORIDE 9 MG/ML
INJECTION, SOLUTION INTRAVENOUS CONTINUOUS PRN
Status: DISCONTINUED | OUTPATIENT
Start: 2019-05-21 | End: 2019-05-21

## 2019-05-21 RX ORDER — SODIUM CHLORIDE 9 MG/ML
INJECTION, SOLUTION INTRAVENOUS ONCE
Status: COMPLETED | OUTPATIENT
Start: 2019-05-21 | End: 2019-05-21

## 2019-05-21 RX ORDER — LIDOCAINE HYDROCHLORIDE 20 MG/ML
INJECTION, SOLUTION EPIDURAL; INFILTRATION; INTRACAUDAL; PERINEURAL
Status: DISCONTINUED
Start: 2019-05-21 | End: 2019-05-21 | Stop reason: HOSPADM

## 2019-05-21 RX ADMIN — LIDOCAINE HYDROCHLORIDE 100 MG: 20 INJECTION, SOLUTION INTRAVENOUS at 11:05

## 2019-05-21 RX ADMIN — PROPOFOL 20 MG: 10 INJECTION, EMULSION INTRAVENOUS at 12:05

## 2019-05-21 RX ADMIN — SODIUM CHLORIDE: 0.9 INJECTION, SOLUTION INTRAVENOUS at 11:05

## 2019-05-21 RX ADMIN — PROPOFOL 50 MG: 10 INJECTION, EMULSION INTRAVENOUS at 11:05

## 2019-05-21 RX ADMIN — PROPOFOL 40 MG: 10 INJECTION, EMULSION INTRAVENOUS at 11:05

## 2019-05-21 RX ADMIN — PROPOFOL 20 MG: 10 INJECTION, EMULSION INTRAVENOUS at 11:05

## 2019-05-21 NOTE — [employer] PATIENT INSTRUCTIONS
Discharge Summary/Instructions after an Endoscopic Procedure  Patient Name: Bessy Nunez  Patient MRN: [id number]  Patient YOB: 1960  Tuesday, May 21, 2019  Alex Ascencio MD  RESTRICTIONS:  During your procedure today, you received medications for sedation.  These   medications may affect your judgment, balance and coordination.  Therefore,   for 24 hours, you have the following restrictions:   - DO NOT drive a car, operate machinery, make legal/financial decisions,   sign important papers or drink alcohol.    ACTIVITY:  Today: no heavy lifting, straining or running due to procedural   sedation/anesthesia.  The following day: return to full activity including work.  DIET:  Eat and drink normally unless instructed otherwise.     TREATMENT FOR COMMON SIDE EFFECTS:  - Mild abdominal pain, nausea, belching, bloating or excessive gas:  rest,   eat lightly and use a heating pad.  - Sore Throat: treat with throat lozenges and/or gargle with warm salt   water.  - Because air was used during the procedure, expelling large amounts of air   from your rectum or belching is normal.  - If a bowel prep was taken, you may not have a bowel movement for 1-3 days.    This is normal.  SYMPTOMS TO WATCH FOR AND REPORT TO YOUR PHYSICIAN:  1. Abdominal pain or bloating, other than gas cramps.  2. Chest pain.  3. Back pain.  4. Signs of infection such as: chills or fever occurring within 24 hours   after the procedure.  5. Rectal bleeding, which would show as bright red, maroon, or black stools.   (A tablespoon of blood from the rectum is not serious, especially if   hemorrhoids are present.)  6. Vomiting.  7. Weakness or dizziness.  GO DIRECTLY TO THE NEAREST EMERGENCY ROOM IF YOU HAVE ANY OF THE FOLLOWING:      Difficulty breathing              Chills and/or fever over 101 F   Persistent vomiting and/or vomiting blood   Severe abdominal pain   Severe chest pain   Black, tarry stools   Bleeding- more than one  tablespoon   Any other symptom or condition that you feel may need urgent attention  Your doctor recommends these additional instructions:  If any biopsies were taken, your doctors clinic will contact you in 1 to 2   weeks with any results.  - Repeat colonoscopy in 1 year for surveillance.   - Return to primary care physician as previously scheduled.   - Discharge patient to home (via wheelchair).  For questions, problems or results please call your physician - Alex Ascencio MD at Work:  [phone].  [facility] can be reached at [phone]     IF A COMPLICATION OR EMERGENCY SITUATION ARISES AND YOU ARE UNABLE TO REACH   YOUR PHYSICIAN - GO DIRECTLY TO THE EMERGENCY ROOM.  Alex Ascencio MD  5/21/2019 12:15:07 PM  This report has been verified and signed electronically.  [employer]

## 2019-05-21 NOTE — ANESTHESIA PREPROCEDURE EVALUATION
05/21/2019  Bessy Nunez is a 59 y.o., male.    Anesthesia Evaluation    I have reviewed the Patient Summary Reports.    I have reviewed the Nursing Notes.      Review of Systems  Anesthesia Hx:  No problems with previous Anesthesia  Denies Family Hx of Anesthesia complications.   Denies Personal Hx of Anesthesia complications.   Social:  No Alcohol Use, Non-Smoker    Cardiovascular:   Exercise tolerance: poor Hypertension Denies MI.          Pulmonary:   Sleep Apnea    Renal/:   Chronic Renal Disease    Hepatic/GI:   PUD, Liver Disease, CHASE   Neurological:   CVA Neuromuscular Disease, CNS vasculitis, Multiple sclerosis   Endocrine:   Diabetes    Psych:   Psychiatric History depression          Physical Exam  General:  Well nourished    Airway/Jaw/Neck:  Airway Findings: Mouth Opening: Normal Tongue: Normal  Mallampati: III  TM Distance: 4 - 6 cm  Jaw/Neck Findings:  Neck ROM: Normal ROM      Dental:  Dental Findings: In tact   Chest/Lungs:  Chest/Lungs Findings: Clear to auscultation, Normal Respiratory Rate     Heart/Vascular:  Heart Findings: Rate: Normal  Rhythm: Regular Rhythm        Mental Status:  Mental Status Findings:  Flat affect, answers questions appropriately     Wt Readings from Last 3 Encounters:   04/30/19 99.3 kg (219 lb)   04/26/19 99.5 kg (219 lb 5.7 oz)   04/22/19 99 kg (218 lb 5.9 oz)     Temp Readings from Last 3 Encounters:   04/26/19 36.7 °C (98.1 °F)   04/26/19 37 °C (98.6 °F)   04/01/19 36.7 °C (98 °F)     BP Readings from Last 3 Encounters:   04/26/19 131/79   04/26/19 (!) 148/90   04/22/19 119/81     Pulse Readings from Last 3 Encounters:   04/26/19 94   04/26/19 91   04/22/19 101     Lab Results   Component Value Date    WBC 8.78 04/26/2019    HGB 10.9 (L) 04/26/2019    HCT 32.4 (L) 04/26/2019    MCV 83 04/26/2019     04/26/2019     CMP  Sodium   Date Value Ref Range  Status   04/26/2019 139 136 - 145 mmol/L Final     Potassium   Date Value Ref Range Status   04/26/2019 3.8 3.5 - 5.1 mmol/L Final     Chloride   Date Value Ref Range Status   04/26/2019 104 95 - 110 mmol/L Final     CO2   Date Value Ref Range Status   04/26/2019 24 23 - 29 mmol/L Final     Glucose   Date Value Ref Range Status   04/26/2019 150 (H) 70 - 110 mg/dL Final     BUN, Bld   Date Value Ref Range Status   04/26/2019 21 8 - 23 mg/dL Final     Creatinine   Date Value Ref Range Status   04/26/2019 1.2 0.5 - 1.4 mg/dL Final     Calcium   Date Value Ref Range Status   04/26/2019 9.6 8.7 - 10.5 mg/dL Final     Total Protein   Date Value Ref Range Status   04/26/2019 7.2 6.0 - 8.4 g/dL Final     Albumin   Date Value Ref Range Status   04/26/2019 3.8 3.5 - 5.2 g/dL Final   10/11/2013 4.3 3.6 - 5.1 g/dL Final     Comment:     @ Test Performed By:  [facility]  Cici Estrada M.D., JAYCOB.,   [address] [place] [id number]  [facility] #[id number]     Total Bilirubin   Date Value Ref Range Status   04/26/2019 0.6 0.1 - 1.0 mg/dL Final     Comment:     For infants and newborns, interpretation of results should be based  on gestational age, weight and in agreement with clinical  observations.  Premature Infant recommended reference ranges:  Up to 24 hours.............<8.0 mg/dL  Up to 48 hours............<12.0 mg/dL  3-5 days..................<15.0 mg/dL  6-29 days.................<15.0 mg/dL       Alkaline Phosphatase   Date Value Ref Range Status   04/26/2019 76 55 - 135 U/L Final     AST   Date Value Ref Range Status   04/26/2019 17 10 - 40 U/L Final     ALT   Date Value Ref Range Status   04/26/2019 9 (L) 10 - 44 U/L Final     Anion Gap   Date Value Ref Range Status   04/26/2019 11 8 - 16 mmol/L Final     eGFR if    Date Value Ref Range Status   04/26/2019 >60.0 >60 mL/min/1.73 m^2 Final     eGFR if non    Date Value Ref Range Status    04/26/2019 >60.0 >60 mL/min/1.73 m^2 Final     Comment:     Calculation used to obtain the estimated glomerular filtration  rate (eGFR) is the CKD-EPI equation.            Anesthesia Plan  Type of Anesthesia, risks & benefits discussed:  Anesthesia Type:  general, MAC  Patient's Preference:   Intra-op Monitoring Plan: standard ASA monitors  Intra-op Monitoring Plan Comments:   Post Op Pain Control Plan: multimodal analgesia and per primary service following discharge from PACU  Post Op Pain Control Plan Comments:   Induction:   IV  Beta Blocker:  Patient is not currently on a Beta-Blocker (No further documentation required).       Informed Consent: Patient understands risks and agrees with Anesthesia plan.  Questions answered. Anesthesia consent signed with patient.  ASA Score: 3     Day of Surgery Review of History & Physical: I have interviewed and examined the patient. I have reviewed the patient's H&P dated:            Ready For Surgery From Anesthesia Perspective.

## 2019-05-21 NOTE — TRANSFER OF CARE
Anesthesia Transfer of Care Note    Patient: Bessy Nunez    Procedure(s) Performed: Procedure(s) (LRB):  COLONOSCOPY (N/A)    Patient location: GI    Anesthesia Type: general    Transport from OR: Transported from OR on room air with adequate spontaneous ventilation    Post pain: adequate analgesia    Post assessment: no apparent anesthetic complications    Post vital signs: stable    Level of consciousness: awake, alert and oriented    Nausea/Vomiting: no nausea/vomiting    Complications: none    Transfer of care protocol was followed      Last vitals:   Visit Vitals  /82   Pulse 93   Temp 36.8 °C (98.2 °F)   Resp 16   SpO2 99%

## 2019-05-21 NOTE — ANESTHESIA POSTPROCEDURE EVALUATION
Anesthesia Post Evaluation    Patient: Bessy Nunez    Procedure(s) Performed: Procedure(s) (LRB):  COLONOSCOPY (N/A)    Final Anesthesia Type: general  Patient location during evaluation: GI PACU  Patient participation: Yes- Able to Participate  Level of consciousness: awake and alert  Post-procedure vital signs: reviewed and stable  Pain management: adequate  Airway patency: patent  PONV status at discharge: No PONV  Anesthetic complications: no      Cardiovascular status: hemodynamically stable  Respiratory status: unassisted and spontaneous ventilation  Hydration status: euvolemic  Follow-up not needed.          Vitals Value Taken Time   /78 5/21/2019 12:40 PM   Temp 36.8 °C (98.2 °F) 5/21/2019 12:13 PM   Pulse 82 5/21/2019 12:40 PM   Resp 18 5/21/2019 12:40 PM   SpO2 99 % 5/21/2019 12:40 PM         Event Time     Out of Recovery 12:41:25          Pain/Poonam Score: Poonam Score: 10 (5/21/2019 12:40 PM)

## 2019-05-21 NOTE — H&P
Pre-Procedure H&P:  Reason for Procedure: history of colon polyps    HPI:  Pt is a 59 y.o. male with a history of colon polyps    Past Medical History:   Diagnosis Date    Amblyopia     Cataract     CNS vasculitis 6/2/2017    Follows with Dr. Love By mri.  Several active lesions, many old. 5/13: MRA brain/neck, MRI brain w/wo contrast show no vascular occlusion, multiple foci of hyperintensity in deep cerebral periventricular white matter in pattern of demyelinating process.  5/17: MRI spine performed, no spinal cord lesions. Hospitalization 6/2017. EEG was performed negative for seizures.  Repeat MRI showing new lesion, question whether this was demyelination versus CVA. 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 Findings compatible with a small acute lacunar infarct adjacent to the left frontal horn.  Nonspecific enhancement just inferior to this region and extending into the left basal ganglia, as well as within the inferior aspect of the left cerebellum.  No evidence of a focal mass. 2.  Extensive increased signal intensity involving the periventricular white matter compatible with demyelinating disease versus vasculitis. 3.  Clinical correlation and followup recommended. 4.  This reportedly flattened in the EPIC and the corpus callosum medical record system.    Mixed hyperlipidemia 11/9/2012    NAFLD (nonalcoholic fatty liver disease) 10/11/2013    CHASE (nonalcoholic steatohepatitis)     Obesity     Stroke     Type 2 diabetes mellitus with diabetic polyneuropathy, with long-term current use of insulin 5/14/2017    Type 2 diabetes mellitus with hyperglycemia, with long-term current use of insulin 10/11/2013       Past Surgical History:   Procedure Laterality Date    BIOPSY LIVER AND  ULTRASOUND N/A 6/9/2015    Performed by [facility] OR 2ND FLR    COLONOSCOPY N/A 2/21/2014    Performed by [facility]ves [facility] at [facility] ENDO    ESOPHAGOGASTRODUODENOSCOPY (EGD) N/A 5/29/2017    Performed by Hai Carter MD at [facility] ENDO (2ND FLR)    LJONNXAST-MIQN-W-CATH N/A 12/7/2018    Performed by [facility] Provider [facility] OR 2ND FLR    SKIN CANCER EXCISION         Family History   Problem Relation Age of Onset    Heart disease Mother     Hypertension Mother     Heart failure Mother     Cataracts Mother     Cancer Father         lung    Diabetes Maternal Aunt     Stroke Sister     Rheum arthritis Paternal Grandmother     Amblyopia Neg Hx     Blindness Neg Hx     Glaucoma Neg Hx     Macular degeneration Neg Hx     Retinal detachment Neg Hx     Strabismus Neg Hx        Social History     Socioeconomic History    Marital status:      Spouse name: Not on file    Number of children: Not on file    Years of education: Not on file    Highest education level: Not on file   Occupational History    Occupation: unemployed   Social Needs    Financial resource strain: Not on file    Food insecurity:     Worry: Not on file     Inability: Not on file    Transportation needs:     Medical: Not on file     Non-medical: Not on file   Tobacco Use    Smoking status: Never Smoker    Smokeless tobacco: Never Used   Substance and Sexual Activity    Alcohol use: No     Alcohol/week: 0.0 oz    Drug use: No    Sexual activity: Not on file   Lifestyle    Physical activity:     Days per week: Not on file     Minutes per session: Not on file    Stress: Not on file   Relationships    Social connections:     Talks on phone: Not on file     Gets together: Not on file     Attends Sikh service: Not on file     Active member of club or organization: Not on file     Attends meetings of clubs or organizations: Not on file     Relationship status: Not on file   Other Topics  Concern    Not on file   Social History Narrative    Not on file       Endoscopic History:  2014    Review of patient's allergies indicates:  No Known Allergies    No current facility-administered medications on file prior to encounter.      Current Outpatient Medications on File Prior to Encounter   Medication Sig Dispense Refill    alclomethasone (ACLOVATE) 0.05 % cream       aspirin (ECOTRIN) 81 MG EC tablet Take 81 mg by mouth once daily.      atenolol (TENORMIN) 50 MG tablet Take 1 tablet (50 mg total) by mouth once daily. 90 tablet 3    atorvastatin (LIPITOR) 40 MG tablet Take 1 tablet (40 mg total) by mouth once daily. 90 tablet 3    blood sugar diagnostic (TRUE METRIX GLUCOSE TEST STRIP) Strp Test blood sugar four times a day 400 each 11    diltiaZEM (DILACOR XR) 240 MG CDCR Take 1 capsule (240 mg total) by mouth once daily. 90 capsule 3    flash glucose sensor (FREESTYLE ARELY 14 DAY SENSOR) Kit Glucose checking 4 times a day 2 kit 11    insulin (BASAGLAR KWIKPEN U-100 INSULIN) glargine 100 units/mL (3mL) SubQ pen Inject 40 Units into the skin 2 (two) times daily. Up to 60 BID with cytoxan 2 Box 11    insulin aspart U-100 (NOVOLOG) 100 unit/mL InPn pen Inject 20 Units into the skin 3 (three) times daily with meals. (Patient taking differently: Inject 20 Units into the skin 4 (four) times daily. ) 18 mL 11    insulin syringe-needle U-100 (INSULIN SYRINGE) 1/2 mL 30 gauge x 5/16 Syrg Use 3x/day 300 each 3    irbesartan (AVAPRO) 300 MG tablet Take 1 tablet (300 mg total) by mouth every evening. 90 tablet 3    ketoconazole (NIZORAL) 2 % cream       lancets 30 gauge Misc Test blood sugar four times a day 100 each 11    liraglutide 0.6 mg/0.1 mL, 18 mg/3 mL, subq PNIJ (VICTOZA 3-TOMASZ) 0.6 mg/0.1 mL (18 mg/3 mL) PnIj Inject 1.8 mg into the skin once daily. 9 mL 11    metFORMIN (GLUCOPHAGE-XR) 500 MG 24 hr tablet Take 2 tablets (1,000 mg total) by mouth 2 (two) times daily with meals. 360 tablet 3     omega-3 fatty acids-vitamin E (FISH OIL) 1,000 mg Cap Take 1 capsule by mouth 2 (two) times daily.  0    sertraline (ZOLOFT) 100 MG tablet Take 1 tab and half (total 150mg) daily 45 tablet 1    vitamin D 1000 units Tab Take 5 tablets (5,000 Units total) by mouth once daily.       No current facility-administered medications for this encounter.     Facility-Administered Medications Ordered in Other Encounters:     0.9%  NaCl infusion, , , Continuous PRN, Perla Bingham CRNA    ROS: Negative x 10    Patient Vitals for the past 24 hrs:   BP Temp Pulse Resp SpO2   05/21/19 1113 131/72 98.8 °F (37.1 °C) 96 18 95 %       Gen: Well developed, well nourished, no apparent distress  HEENT: Anicteric, PERRLA  CV: S1, S2, no murmers/rubs, non-displaced PMI  Lungs: CTA-B, normal excursion  Abd: Soft, NT, ND, normal BS's, no HSM  Ext: No c/c/e, 1+ DP pulses to BLE's  Neuro: CN II-XII grossly intact, no asterixis.  Skin: No rashes/lesions.  Psych: AA&O x 4    Assessment:  Pt. Is a 59 y.o. male with a history of colon polyps    Recommendations:  1.Colonoscopy

## 2019-05-21 NOTE — PROGRESS NOTES
SECTION OF HEMATOLOGY AND BONE MARROW TRANSPLANT    05/26/2019  Referred by:  Dr. Love  Referred for: Cytoxan    HISTORY OF PRESENT ILLNESS:   Presents today to clinic alone for another cycle of cytoxan chemotherpay for CNS Vasculitis. This would be dose (#22).  Neurologic symptoms worsened when Cytoxan was stopped in 2018. Neurological symptoms have improved overall since restarting Cytoxan.  Plan to continue for now per neurology, he was seen for follow-up 4/22/2019.  He has largely tolerated therapy with no major complications other than brief periods of neutropenia. Today he is feeling well without any complaints. Denies fevers, chills, night sweats, nausea, vomiting, constipation, neuropathy, chest pain, SOB.  Denies HA, dizziness or vision changes. Reports neurologic status improved. Denies any falls at home recently (he was falling when cytoxan was stopped). He has occasional diarrhea with cytoxan. He takes imodium OTC PRN.       PAST MEDICAL HISTORY:   Past Medical History:   Diagnosis Date    Amblyopia     Cataract     CNS vasculitis 6/2/2017    Follows with Dr. Love By mri.  Several active lesions, many old. 5/13: MRA brain/neck, MRI brain w/wo contrast show no vascular occlusion, multiple foci of hyperintensity in deep cerebral periventricular white matter in pattern of demyelinating process.  5/17: MRI spine performed, no spinal cord lesions. Hospitalization 6/2017. EEG was performed negative for seizures.  Repeat MRI showing new lesion, question whether this was demyelination versus CVA. 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 Findings compatible with a small acute lacunar infarct adjacent to the left frontal horn.  Nonspecific enhancement just inferior to this region and  extending into the left basal ganglia, as well as within the inferior aspect of the left cerebellum.  No evidence of a focal mass. 2.  Extensive increased signal intensity involving the periventricular white matter compatible with demyelinating disease versus vasculitis. 3.  Clinical correlation and followup recommended. 4.  This reportedly flattened in the EPIC and the corpus callosum medical record system.    Mixed hyperlipidemia 11/9/2012    NAFLD (nonalcoholic fatty liver disease) 10/11/2013    CHASE (nonalcoholic steatohepatitis)     Obesity     Stroke     Type 2 diabetes mellitus with diabetic polyneuropathy, with long-term current use of insulin 5/14/2017    Type 2 diabetes mellitus with hyperglycemia, with long-term current use of insulin 10/11/2013       PAST SURGICAL HISTORY:   Past Surgical History:   Procedure Laterality Date    BIOPSY LIVER AND ULTRASOUND N/A 6/9/2015    Performed by [facility] at [facility] OR 2ND FLR    COLONOSCOPY N/A 5/21/2019    [facility] by [facility] at [facility]    COLONOSCOPY N/A 2/21/2014    Performed by [facility] at [facility] END[facility]    ESOPHAGOGASTRODUODENOSCOPY (EGD) N/A 5/29/2017    Performed by Hai Carter [facility] at [facility] ENDO (2ND FLR)    IVVTAIBFT-NWYK-J-CATH N/A 12/7/2018    Performed by [facility] Diagnostic Provider at [facility] OR 2ND FLR    SKIN CANCER EXCISION         PAST SOCIAL HISTORY:   reports that he has never smoked. He has never used smokeless tobacco. He reports that he does not drink alcohol or use drugs.    FAMILY HISTORY:  Family History   Problem Relation Age of Onset    Heart disease Mother     Hypertension Mother     Heart failure Mother     Cataracts Mother     Cancer Father         lung    Diabetes Maternal Aunt     Stroke Sister     Rheum arthritis Paternal Grandmother     Amblyopia Neg Hx     Blindness Neg Hx     Glaucoma Neg Hx     Macular degeneration Neg Hx     Retinal detachment Neg Hx     Strabismus Neg Hx         CURRENT MEDICATIONS:   Current Outpatient Medications   Medication Sig    alclomethasone (ACLOVATE) 0.05 % cream     alendronate (FOSAMAX) 70 MG tablet Take 1 tablet (70 mg total) by mouth every 7 days.    aspirin (ECOTRIN) 81 MG EC tablet Take 81 mg by mouth once daily.    atenolol (TENORMIN) 50 MG tablet Take 1 tablet (50 mg total) by mouth once daily.    atorvastatin (LIPITOR) 40 MG tablet Take 1 tablet (40 mg total) by mouth once daily.    blood sugar diagnostic (TRUE METRIX GLUCOSE TEST STRIP) Strp Test blood sugar four times a day    diltiaZEM (DILACOR XR) 240 MG CDCR Take 1 capsule (240 mg total) by mouth once daily.    flash glucose sensor (FREESTYLE ARELY 14 DAY SENSOR) Kit Glucose checking 4 times a day    FREESTYLE ARELY 14 DAY READER Misc GLUCOSE TESTING : FASTING AND PRIOR TO MEALS    insulin (BASAGLAR KWIKPEN U-100 INSULIN) glargine 100 units/mL (3mL) SubQ pen Inject 40 Units into the skin 2 (two) times daily. Up to 60 BID with cytoxan    insulin aspart U-100 (NOVOLOG) 100 unit/mL InPn pen Inject 20 Units into the skin 3 (three) times daily with meals. (Patient taking differently: Inject 20 Units into the skin 4 (four) times daily. )    insulin syringe-needle U-100 (INSULIN SYRINGE) 1/2 mL 30 gauge x 5/16 Syrg Use 3x/day    irbesartan (AVAPRO) 300 MG tablet Take 1 tablet (300 mg total) by mouth every evening.    ketoconazole (NIZORAL) 2 % cream     lancets 30 gauge Misc Test blood sugar four times a day    liraglutide 0.6 mg/0.1 mL, 18 mg/3 mL, subq PNIJ (VICTOZA 3-TOMASZ) 0.6 mg/0.1 mL (18 mg/3 mL) PnIj Inject 1.8 mg into the skin once daily.    metFORMIN (GLUCOPHAGE-XR) 500 MG 24 hr tablet Take 2 tablets (1,000 mg total) by mouth 2 (two) times daily with meals.    methylphenidate HCl (RITALIN) 5 MG tablet Take 1 tablet (5 mg total) by mouth once daily.    omega-3 fatty acids-vitamin E (FISH OIL) 1,000 mg Cap Take 1 capsule by mouth 2 (two) times daily.    pen needle, diabetic  "(BD ULTRA-FINE ANA PEN NEEDLE) 32 gauge x 5/32" Ndle 4x daily insulin pen needle, relion    QUEtiapine (SEROQUEL) 25 MG Tab Take 1 tablet (25 mg total) by mouth every evening.    sertraline (ZOLOFT) 100 MG tablet Take 1 tab and half (total 150mg) daily    VICTOZA 2-TOMASZ 0.6 mg/0.1 mL (18 mg/3 mL) PnIj INJECT 1.8 MG INTO THE SKIN ONCE DAILY    vitamin D 1000 units Tab Take 5 tablets (5,000 Units total) by mouth once daily.     No current facility-administered medications for this visit.      ALLERGIES:   Review of patient's allergies indicates:  No Known Allergies    REVIEW OF SYSTEMS:   General ROS: Negative  Psychological ROS: Confusion much improved.   Ophthalmic ROS: negative  ENT ROS: negative  Allergy and Immunology ROS: negative  Hematological and Lymphatic ROS: negative  Endocrine ROS: negative  Respiratory ROS: negative  Cardiovascular ROS: negative  Gastrointestinal ROS: negative  Genito-Urinary ROS: negative  Musculoskeletal ROS: negative  Neurological ROS: see HPI  Dermatological ROS: negative    PHYSICAL EXAM:   Vitals:    05/24/19 1309   BP: 113/71   Pulse: 88   Temp: 98.5 °F (36.9 °C)       General - well developed, well nourished; much more interactive today. A+O x3  Head & Face - no sinus tenderness  Eyes - normal conjunctivae and lids   ENT - normal external auditory canals, no mouth sores  Chest and Lung - normal respiratory effort, clear to auscultation bilaterally   Cardiovascular - RRR with no MGR, normal S1 and S2; no pedal edema  Abdomen -  soft, nontender, no palpable hepatomegaly or splenomegaly  Lymph - no palpable lymphadenopathy  Extremities - unremarkable nails and digits  Heme - no bruising, petechiae, pallor  Skin - no rashes or lesions   Psych - Normal mood and affect. No confusion noted.    ECOG Performance Status: (foot note - ECOG PS provided by Eastern Cooperative Oncology Group) 1 - Symptomatic but completely ambulatory    Karnofsky Performance Score:  80%- Normal Activity " with Effort: Some Symptoms of Disease  DATA:   Lab Results   Component Value Date    WBC 7.28 05/24/2019    HGB 10.3 (L) 05/24/2019    HCT 30.8 (L) 05/24/2019    MCV 84 05/24/2019     (L) 05/24/2019     Gran # (ANC)   Date Value Ref Range Status   05/24/2019 5.3 1.8 - 7.7 K/uL Final     Gran%   Date Value Ref Range Status   05/24/2019 72.4 38.0 - 73.0 % Final     CMP  Sodium   Date Value Ref Range Status   05/24/2019 140 136 - 145 mmol/L Final     Potassium   Date Value Ref Range Status   05/24/2019 4.0 3.5 - 5.1 mmol/L Final     Chloride   Date Value Ref Range Status   05/24/2019 108 95 - 110 mmol/L Final     CO2   Date Value Ref Range Status   05/24/2019 24 23 - 29 mmol/L Final     Glucose   Date Value Ref Range Status   05/24/2019 232 (H) 70 - 110 mg/dL Final     BUN, Bld   Date Value Ref Range Status   05/24/2019 15 8 - 23 mg/dL Final     Creatinine   Date Value Ref Range Status   05/24/2019 1.2 0.5 - 1.4 mg/dL Final     Calcium   Date Value Ref Range Status   05/24/2019 9.5 8.7 - 10.5 mg/dL Final     Total Protein   Date Value Ref Range Status   05/24/2019 6.7 6.0 - 8.4 g/dL Final     Albumin   Date Value Ref Range Status   05/24/2019 3.6 3.5 - 5.2 g/dL Final   10/11/2013 4.3 3.6 - 5.1 g/dL Final     Comment:     @ Test Performed By:  [employer] AMELIA Almazan M.D.,   [address] [place] [id number]  CLIA #[id number]     Total Bilirubin   Date Value Ref Range Status   05/24/2019 0.5 0.1 - 1.0 mg/dL Final     Comment:     For infants and newborns, interpretation of results should be based  on gestational age, weight and in agreement with clinical  observations.  Premature Infant recommended reference ranges:  Up to 24 hours.............<8.0 mg/dL  Up to 48 hours............<12.0 mg/dL  3-5 days..................<15.0 mg/dL  6-29 days.................<15.0 mg/dL       Alkaline Phosphatase   Date Value Ref Range Status   05/24/2019 64 99 - 689  U/L Final     AST   Date Value Ref Range Status   05/24/2019 18 10 - 40 U/L Final     ALT   Date Value Ref Range Status   05/24/2019 14 10 - 44 U/L Final     Anion Gap   Date Value Ref Range Status   05/24/2019 8 8 - 16 mmol/L Final     eGFR if    Date Value Ref Range Status   05/24/2019 >60.0 >60 mL/min/1.73 m^2 Final     eGFR if non    Date Value Ref Range Status   05/24/2019 >60.0 >60 mL/min/1.73 m^2 Final     Comment:     Calculation used to obtain the estimated glomerular filtration  rate (eGFR) is the CKD-EPI equation.          ASSESSMENT AND PLAN:   Encounter Diagnoses   Name Primary?    CNS vasculitis Yes    Chemotherapy follow-up examination     Anemia associated with chemotherapy     Chemotherapy-induced thrombocytopenia     Chemotherapy induced diarrhea     Essential hypertension     Type 2 diabetes mellitus with diabetic polyneuropathy, with long-term current use of insulin     Mixed hyperlipidemia        CNS vasculitis  -continue CTX per neuro recs  -proceed with dose #22 Cytoxan was decreased to 600 mg/m2 (due to CrCl) with mesna support for CNS vasculitis, protocol per Dr. Love; improved symptoms since initiation   -will continue per Neurology, failed Cytoxan hiatus and transition to Imuran Sept/Oct 2018. Restarted cytoxan 1/23/2018. Now much improved back on Cytoxan.  - last saw Neurology 4/22/19   -No contraindication to proceed with CTX today    Cytopenias- Anemia/thrombocytopenia  -mild anemia and thromboctyopenia 2/2 chemotherapy, WBC wnl  -Transfuse for Hgb<7 and Plt <10k, no indication to transfuse today    Diarrhea  - imodium OTC PRN    HTN/HLD/DM2  - f/u with PCP  - continue metformin, liraglutide, novolog, irbesartan, diazepam, lipitor, atenolol, ASA  - BG elevated today; pt just ate lunch prior to lab draw     F/U:  - cbc, cmp, lab only in 2 weeks at [facility] (lab collect).  - CBC, CMP, NP appt or Dr Goldstein, & cytoxan infusion in 4  weeks (clinic collect labs from port).

## 2019-05-24 ENCOUNTER — OFFICE VISIT (OUTPATIENT)
Dept: HEMATOLOGY/ONCOLOGY | Facility: CLINIC | Age: 59
End: 2019-05-24
Payer: MEDICARE

## 2019-05-24 ENCOUNTER — INFUSION (OUTPATIENT)
Dept: INFUSION THERAPY | Facility: HOSPITAL | Age: 59
End: 2019-05-24
Attending: INTERNAL MEDICINE
Payer: MEDICARE

## 2019-05-24 ENCOUNTER — LAB VISIT (OUTPATIENT)
Dept: LAB | Facility: HOSPITAL | Age: 59
End: 2019-05-24
Attending: INTERNAL MEDICINE
Payer: MEDICARE

## 2019-05-24 VITALS
TEMPERATURE: 99 F | WEIGHT: 219.56 LBS | HEART RATE: 77 BPM | DIASTOLIC BLOOD PRESSURE: 71 MMHG | HEIGHT: 70 IN | RESPIRATION RATE: 18 BRPM | SYSTOLIC BLOOD PRESSURE: 123 MMHG | BODY MASS INDEX: 31.43 KG/M2

## 2019-05-24 VITALS
TEMPERATURE: 99 F | DIASTOLIC BLOOD PRESSURE: 71 MMHG | OXYGEN SATURATION: 96 % | SYSTOLIC BLOOD PRESSURE: 113 MMHG | HEIGHT: 70 IN | WEIGHT: 219.56 LBS | HEART RATE: 88 BPM | BODY MASS INDEX: 31.43 KG/M2

## 2019-05-24 DIAGNOSIS — T45.1X5A CHEMOTHERAPY-INDUCED NEUTROPENIA: ICD-10-CM

## 2019-05-24 DIAGNOSIS — I10 ESSENTIAL HYPERTENSION: ICD-10-CM

## 2019-05-24 DIAGNOSIS — E11.42 TYPE 2 DIABETES MELLITUS WITH DIABETIC POLYNEUROPATHY, WITH LONG-TERM CURRENT USE OF INSULIN: ICD-10-CM

## 2019-05-24 DIAGNOSIS — Z79.4 TYPE 2 DIABETES MELLITUS WITH DIABETIC POLYNEUROPATHY, WITH LONG-TERM CURRENT USE OF INSULIN: ICD-10-CM

## 2019-05-24 DIAGNOSIS — E78.2 MIXED HYPERLIPIDEMIA: ICD-10-CM

## 2019-05-24 DIAGNOSIS — D64.81 ANEMIA ASSOCIATED WITH CHEMOTHERAPY: ICD-10-CM

## 2019-05-24 DIAGNOSIS — T45.1X5A CHEMOTHERAPY INDUCED DIARRHEA: ICD-10-CM

## 2019-05-24 DIAGNOSIS — I77.6 CNS VASCULITIS: Primary | ICD-10-CM

## 2019-05-24 DIAGNOSIS — D69.59 CHEMOTHERAPY-INDUCED THROMBOCYTOPENIA: ICD-10-CM

## 2019-05-24 DIAGNOSIS — Z09 CHEMOTHERAPY FOLLOW-UP EXAMINATION: ICD-10-CM

## 2019-05-24 DIAGNOSIS — T45.1X5A ANEMIA ASSOCIATED WITH CHEMOTHERAPY: ICD-10-CM

## 2019-05-24 DIAGNOSIS — T45.1X5A CHEMOTHERAPY-INDUCED THROMBOCYTOPENIA: ICD-10-CM

## 2019-05-24 DIAGNOSIS — K52.1 CHEMOTHERAPY INDUCED DIARRHEA: ICD-10-CM

## 2019-05-24 DIAGNOSIS — D70.1 CHEMOTHERAPY-INDUCED NEUTROPENIA: ICD-10-CM

## 2019-05-24 LAB
ALBUMIN SERPL BCP-MCNC: 3.6 G/DL (ref 3.5–5.2)
ALP SERPL-CCNC: 71 U/L (ref 55–135)
ALT SERPL W/O P-5'-P-CCNC: 14 U/L (ref 10–44)
ANION GAP SERPL CALC-SCNC: 8 MMOL/L (ref 8–16)
AST SERPL-CCNC: 18 U/L (ref 10–40)
BASOPHILS # BLD AUTO: 0.03 K/UL (ref 0–0.2)
BASOPHILS NFR BLD: 0.4 % (ref 0–1.9)
BILIRUB SERPL-MCNC: 0.5 MG/DL (ref 0.1–1)
BUN SERPL-MCNC: 15 MG/DL (ref 8–23)
CALCIUM SERPL-MCNC: 9.5 MG/DL (ref 8.7–10.5)
CHLORIDE SERPL-SCNC: 108 MMOL/L (ref 95–110)
CO2 SERPL-SCNC: 24 MMOL/L (ref 23–29)
CREAT SERPL-MCNC: 1.2 MG/DL (ref 0.5–1.4)
DIFFERENTIAL METHOD: ABNORMAL
EOSINOPHIL # BLD AUTO: 0.2 K/UL (ref 0–0.5)
EOSINOPHIL NFR BLD: 2.1 % (ref 0–8)
ERYTHROCYTE [DISTWIDTH] IN BLOOD BY AUTOMATED COUNT: 15.2 % (ref 11.5–14.5)
EST. GFR  (AFRICAN AMERICAN): >60 ML/MIN/1.73 M^2
EST. GFR  (NON AFRICAN AMERICAN): >60 ML/MIN/1.73 M^2
GLUCOSE SERPL-MCNC: 232 MG/DL (ref 70–110)
HCT VFR BLD AUTO: 30.8 % (ref 40–54)
HGB BLD-MCNC: 10.3 G/DL (ref 14–18)
IMM GRANULOCYTES # BLD AUTO: 0.03 K/UL (ref 0–0.04)
IMM GRANULOCYTES NFR BLD AUTO: 0.4 % (ref 0–0.5)
LYMPHOCYTES # BLD AUTO: 1.2 K/UL (ref 1–4.8)
LYMPHOCYTES NFR BLD: 15.8 % (ref 18–48)
MCH RBC QN AUTO: 28.1 PG (ref 27–31)
MCHC RBC AUTO-ENTMCNC: 33.4 G/DL (ref 32–36)
MCV RBC AUTO: 84 FL (ref 82–98)
MONOCYTES # BLD AUTO: 0.7 K/UL (ref 0.3–1)
MONOCYTES NFR BLD: 8.9 % (ref 4–15)
NEUTROPHILS # BLD AUTO: 5.3 K/UL (ref 1.8–7.7)
NEUTROPHILS NFR BLD: 72.4 % (ref 38–73)
NRBC BLD-RTO: 0 /100 WBC
PLATELET # BLD AUTO: 142 K/UL (ref 150–350)
PMV BLD AUTO: 9 FL (ref 9.2–12.9)
POTASSIUM SERPL-SCNC: 4 MMOL/L (ref 3.5–5.1)
PROT SERPL-MCNC: 6.7 G/DL (ref 6–8.4)
RBC # BLD AUTO: 3.66 M/UL (ref 4.6–6.2)
SODIUM SERPL-SCNC: 140 MMOL/L (ref 136–145)
WBC # BLD AUTO: 7.28 K/UL (ref 3.9–12.7)

## 2019-05-24 PROCEDURE — 99999 PR PBB SHADOW E&M-EST. PATIENT-LVL IV: ICD-10-PCS | Mod: PBBFAC,,, | Performed by: NURSE PRACTITIONER

## 2019-05-24 PROCEDURE — 3008F BODY MASS INDEX DOCD: CPT | Mod: CPTII,S$GLB,, | Performed by: NURSE PRACTITIONER

## 2019-05-24 PROCEDURE — 99214 OFFICE O/P EST MOD 30 MIN: CPT | Mod: S$GLB,,, | Performed by: NURSE PRACTITIONER

## 2019-05-24 PROCEDURE — 3008F PR BODY MASS INDEX (BMI) DOCUMENTED: ICD-10-PCS | Mod: CPTII,S$GLB,, | Performed by: NURSE PRACTITIONER

## 2019-05-24 PROCEDURE — 63600175 PHARM REV CODE 636 W HCPCS: Performed by: INTERNAL MEDICINE

## 2019-05-24 PROCEDURE — 80053 COMPREHEN METABOLIC PANEL: CPT

## 2019-05-24 PROCEDURE — 36415 COLL VENOUS BLD VENIPUNCTURE: CPT

## 2019-05-24 PROCEDURE — 3074F SYST BP LT 130 MM HG: CPT | Mod: CPTII,S$GLB,, | Performed by: NURSE PRACTITIONER

## 2019-05-24 PROCEDURE — 3078F DIAST BP <80 MM HG: CPT | Mod: CPTII,S$GLB,, | Performed by: NURSE PRACTITIONER

## 2019-05-24 PROCEDURE — 99499 RISK ADDL DX/OHS AUDIT: ICD-10-PCS | Mod: S$GLB,,, | Performed by: NURSE PRACTITIONER

## 2019-05-24 PROCEDURE — 3044F HG A1C LEVEL LT 7.0%: CPT | Mod: CPTII,S$GLB,, | Performed by: NURSE PRACTITIONER

## 2019-05-24 PROCEDURE — A4216 STERILE WATER/SALINE, 10 ML: HCPCS | Performed by: INTERNAL MEDICINE

## 2019-05-24 PROCEDURE — 85025 COMPLETE CBC W/AUTO DIFF WBC: CPT

## 2019-05-24 PROCEDURE — 99999 PR PBB SHADOW E&M-EST. PATIENT-LVL IV: CPT | Mod: PBBFAC,,, | Performed by: NURSE PRACTITIONER

## 2019-05-24 PROCEDURE — 96367 TX/PROPH/DG ADDL SEQ IV INF: CPT

## 2019-05-24 PROCEDURE — 96413 CHEMO IV INFUSION 1 HR: CPT

## 2019-05-24 PROCEDURE — 3078F PR MOST RECENT DIASTOLIC BLOOD PRESSURE < 80 MM HG: ICD-10-PCS | Mod: CPTII,S$GLB,, | Performed by: NURSE PRACTITIONER

## 2019-05-24 PROCEDURE — 25000003 PHARM REV CODE 250: Performed by: INTERNAL MEDICINE

## 2019-05-24 PROCEDURE — 3074F PR MOST RECENT SYSTOLIC BLOOD PRESSURE < 130 MM HG: ICD-10-PCS | Mod: CPTII,S$GLB,, | Performed by: NURSE PRACTITIONER

## 2019-05-24 PROCEDURE — 99214 PR OFFICE/OUTPT VISIT, EST, LEVL IV, 30-39 MIN: ICD-10-PCS | Mod: S$GLB,,, | Performed by: NURSE PRACTITIONER

## 2019-05-24 PROCEDURE — 99499 UNLISTED E&M SERVICE: CPT | Mod: S$GLB,,, | Performed by: NURSE PRACTITIONER

## 2019-05-24 PROCEDURE — 3044F PR MOST RECENT HEMOGLOBIN A1C LEVEL <7.0%: ICD-10-PCS | Mod: CPTII,S$GLB,, | Performed by: NURSE PRACTITIONER

## 2019-05-24 RX ORDER — HEPARIN 100 UNIT/ML
500 SYRINGE INTRAVENOUS
Status: CANCELLED | OUTPATIENT
Start: 2019-05-24

## 2019-05-24 RX ORDER — HEPARIN 100 UNIT/ML
500 SYRINGE INTRAVENOUS
Status: DISCONTINUED | OUTPATIENT
Start: 2019-05-24 | End: 2019-05-24 | Stop reason: HOSPADM

## 2019-05-24 RX ORDER — SODIUM CHLORIDE 0.9 % (FLUSH) 0.9 %
10 SYRINGE (ML) INJECTION
Status: CANCELLED | OUTPATIENT
Start: 2019-05-24

## 2019-05-24 RX ORDER — SODIUM CHLORIDE 0.9 % (FLUSH) 0.9 %
10 SYRINGE (ML) INJECTION
Status: DISCONTINUED | OUTPATIENT
Start: 2019-05-24 | End: 2019-05-24 | Stop reason: HOSPADM

## 2019-05-24 RX ADMIN — HEPARIN 500 UNITS: 100 SYRINGE at 03:05

## 2019-05-24 RX ADMIN — DEXAMETHASONE SODIUM PHOSPHATE: 4 INJECTION, SOLUTION INTRA-ARTICULAR; INTRALESIONAL; INTRAMUSCULAR; INTRAVENOUS; SOFT TISSUE at 02:05

## 2019-05-24 RX ADMIN — MESNA 1500 MG: 100 INJECTION, SOLUTION INTRAVENOUS at 02:05

## 2019-05-24 RX ADMIN — SODIUM CHLORIDE: 0.9 INJECTION, SOLUTION INTRAVENOUS at 01:05

## 2019-05-24 RX ADMIN — CYCLOPHOSPHAMIDE 1500 MG: 1 INJECTION, POWDER, FOR SOLUTION INTRAVENOUS; ORAL at 02:05

## 2019-05-24 RX ADMIN — Medication 10 ML: at 03:05

## 2019-05-24 NOTE — NURSING
8570  Pt here for Cytoxan, Mesna infusion, accompanied by spouse, no new complaints or concerns at present, reports tolerating treatment; discussed treatment plan for today, all questions answered and pt agrees to proceed

## 2019-05-24 NOTE — Clinical Note
- cbc, cmp, lab only in 2 weeks at [facility]) (lab collect).- CBC, CMP, NP appt or Dr Goldstein, & cytoxan infusion in 4 weeks (clinic collect labs from [facility]).

## 2019-05-24 NOTE — PLAN OF CARE
Problem: Adult Inpatient Plan of Care  Goal: Plan of Care Review  Outcome: Ongoing (interventions implemented as appropriate)  Infusion completed, pt tolerated well; pt instructed to increase daily hydration with water-based fluids; reviewed when to contact MD, when to report to ER; spouse declined printed AVS, both pt and spouse verbalized understanding of all discussed and when to report next

## 2019-05-29 ENCOUNTER — TELEPHONE (OUTPATIENT)
Dept: PSYCHIATRY | Facility: CLINIC | Age: 59
End: 2019-05-29

## 2019-05-29 NOTE — TELEPHONE ENCOUNTER
Pt's wife called to reschedule appointment that they missed, yesterday.  During call, she advised that Bessy is taking his last pill of Ritalin, today. Needs refill, so forwarded to nurses.

## 2019-06-06 ENCOUNTER — HOSPITAL ENCOUNTER (EMERGENCY)
Facility: HOSPITAL | Age: 59
Discharge: HOME | End: 2019-06-06
Attending: SURGERY
Payer: MEDICARE

## 2019-06-06 VITALS
DIASTOLIC BLOOD PRESSURE: 55 MMHG | TEMPERATURE: 98 F | OXYGEN SATURATION: 97 % | HEART RATE: 87 BPM | RESPIRATION RATE: 18 BRPM | SYSTOLIC BLOOD PRESSURE: 115 MMHG

## 2019-06-06 DIAGNOSIS — R42 DIZZINESS: ICD-10-CM

## 2019-06-06 LAB
ALBUMIN SERPL BCP-MCNC: 3.8 G/DL (ref 3.5–5.2)
ALP SERPL-CCNC: 70 U/L (ref 55–135)
ALT SERPL W/O P-5'-P-CCNC: 16 U/L (ref 10–44)
ANION GAP SERPL CALC-SCNC: 14 MMOL/L (ref 8–16)
ANISOCYTOSIS BLD QL SMEAR: SLIGHT
AST SERPL-CCNC: 15 U/L (ref 10–40)
BACTERIA #/AREA URNS HPF: ABNORMAL /HPF
BASOPHILS # BLD AUTO: ABNORMAL K/UL (ref 0–0.2)
BASOPHILS NFR BLD: 1 % (ref 0–1.9)
BILIRUB SERPL-MCNC: 0.8 MG/DL (ref 0.1–1)
BILIRUB UR QL STRIP: ABNORMAL
BUN SERPL-MCNC: 22 MG/DL (ref 8–23)
CALCIUM SERPL-MCNC: 9.7 MG/DL (ref 8.7–10.5)
CHLORIDE SERPL-SCNC: 105 MMOL/L (ref 95–110)
CK MB SERPL-MCNC: 1.4 NG/ML (ref 0.1–6.5)
CK MB SERPL-RTO: 3.5 % (ref 0–5)
CK SERPL-CCNC: 40 U/L (ref 20–200)
CK SERPL-CCNC: 40 U/L (ref 20–200)
CLARITY UR: CLEAR
CO2 SERPL-SCNC: 21 MMOL/L (ref 23–29)
COLOR UR: YELLOW
CREAT SERPL-MCNC: 1.5 MG/DL (ref 0.5–1.4)
DIFFERENTIAL METHOD: ABNORMAL
EOSINOPHIL # BLD AUTO: ABNORMAL K/UL (ref 0–0.5)
EOSINOPHIL NFR BLD: 11 % (ref 0–8)
ERYTHROCYTE [DISTWIDTH] IN BLOOD BY AUTOMATED COUNT: 15.1 % (ref 11.5–14.5)
EST. GFR  (AFRICAN AMERICAN): 57 ML/MIN/1.73 M^2
EST. GFR  (NON AFRICAN AMERICAN): 50 ML/MIN/1.73 M^2
GLUCOSE SERPL-MCNC: 171 MG/DL (ref 70–110)
GLUCOSE UR QL STRIP: NEGATIVE
HCT VFR BLD AUTO: 32 % (ref 40–54)
HGB BLD-MCNC: 10.9 G/DL (ref 14–18)
HGB UR QL STRIP: ABNORMAL
HYALINE CASTS #/AREA URNS LPF: 3 /LPF
HYPOCHROMIA BLD QL SMEAR: ABNORMAL
KETONES UR QL STRIP: ABNORMAL
LEUKOCYTE ESTERASE UR QL STRIP: NEGATIVE
LYMPHOCYTES # BLD AUTO: ABNORMAL K/UL (ref 1–4.8)
LYMPHOCYTES NFR BLD: 41 % (ref 18–48)
MCH RBC QN AUTO: 27.9 PG (ref 27–31)
MCHC RBC AUTO-ENTMCNC: 34.1 G/DL (ref 32–36)
MCV RBC AUTO: 82 FL (ref 82–98)
MICROSCOPIC COMMENT: ABNORMAL
MONOCYTES # BLD AUTO: ABNORMAL K/UL (ref 0.3–1)
MONOCYTES NFR BLD: 18 % (ref 4–15)
NEUTROPHILS NFR BLD: 24 % (ref 38–73)
NEUTS BAND NFR BLD MANUAL: 5 %
NITRITE UR QL STRIP: NEGATIVE
OVALOCYTES BLD QL SMEAR: ABNORMAL
PH UR STRIP: 6 [PH] (ref 5–8)
PLATELET # BLD AUTO: 166 K/UL (ref 150–350)
PMV BLD AUTO: 8.5 FL (ref 9.2–12.9)
POCT GLUCOSE: 174 MG/DL (ref 70–110)
POIKILOCYTOSIS BLD QL SMEAR: ABNORMAL
POTASSIUM SERPL-SCNC: 3.6 MMOL/L (ref 3.5–5.1)
PROT SERPL-MCNC: 7 G/DL (ref 6–8.4)
PROT UR QL STRIP: ABNORMAL
RBC # BLD AUTO: 3.9 M/UL (ref 4.6–6.2)
RBC #/AREA URNS HPF: 1 /HPF (ref 0–4)
SODIUM SERPL-SCNC: 140 MMOL/L (ref 136–145)
SP GR UR STRIP: 1.02 (ref 1–1.03)
TROPONIN I SERPL DL<=0.01 NG/ML-MCNC: 0.01 NG/ML (ref 0–0.03)
URN SPEC COLLECT METH UR: ABNORMAL
UROBILINOGEN UR STRIP-ACNC: NEGATIVE EU/DL
WBC # BLD AUTO: 1.94 K/UL (ref 3.9–12.7)
WBC #/AREA URNS HPF: 1 /HPF (ref 0–5)

## 2019-06-06 PROCEDURE — 93005 ELECTROCARDIOGRAM TRACING: CPT

## 2019-06-06 PROCEDURE — 93010 ELECTROCARDIOGRAM REPORT: CPT | Mod: ,,, | Performed by: INTERNAL MEDICINE

## 2019-06-06 PROCEDURE — 99284 EMERGENCY DEPT VISIT MOD MDM: CPT | Mod: 25

## 2019-06-06 PROCEDURE — 25000003 PHARM REV CODE 250: Performed by: SURGERY

## 2019-06-06 PROCEDURE — 85007 BL SMEAR W/DIFF WBC COUNT: CPT

## 2019-06-06 PROCEDURE — 96360 HYDRATION IV INFUSION INIT: CPT

## 2019-06-06 PROCEDURE — 82553 CREATINE MB FRACTION: CPT

## 2019-06-06 PROCEDURE — 81000 URINALYSIS NONAUTO W/SCOPE: CPT

## 2019-06-06 PROCEDURE — 36415 COLL VENOUS BLD VENIPUNCTURE: CPT

## 2019-06-06 PROCEDURE — 85027 COMPLETE CBC AUTOMATED: CPT

## 2019-06-06 PROCEDURE — 84484 ASSAY OF TROPONIN QUANT: CPT

## 2019-06-06 PROCEDURE — 80053 COMPREHEN METABOLIC PANEL: CPT

## 2019-06-06 PROCEDURE — 82550 ASSAY OF CK (CPK): CPT

## 2019-06-06 PROCEDURE — 93010 EKG 12-LEAD: ICD-10-PCS | Mod: ,,, | Performed by: INTERNAL MEDICINE

## 2019-06-06 PROCEDURE — 82962 GLUCOSE BLOOD TEST: CPT

## 2019-06-06 RX ORDER — SODIUM CHLORIDE 9 MG/ML
1000 INJECTION, SOLUTION INTRAVENOUS
Status: COMPLETED | OUTPATIENT
Start: 2019-06-06 | End: 2019-06-06

## 2019-06-06 RX ADMIN — SODIUM CHLORIDE 1000 ML: 0.9 INJECTION, SOLUTION INTRAVENOUS at 02:06

## 2019-06-06 NOTE — ED TRIAGE NOTES
"59 y.o. male presents to ER ED 04/ED 04   Chief Complaint   Patient presents with    Dizziness   Wife reports patient "not acting right" this morning, came to hospital for routine blood work and became dizzy with blurred vision. Pt is on chemo. No acute distress noted.    "

## 2019-06-06 NOTE — ED PROVIDER NOTES
Encounter Date: 6/6/2019       History     Chief Complaint   Patient presents with    Dizziness     Patient is 59-year-old white male who was coming to the hospital today to get lab work done.  Upon attempting to get out of the car and stand up he had a syncopal episode, dizziness, and some to the ground.  He undergoes chemotherapy treatment for vasculitis.  At the time of this evaluation patient has recovered normal function and states that he feels like his usual self.  He does have a previous history of a stroke.        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 Love By mri.  Several active lesions, many old. 5/13: MRA brain/neck, MRI brain w/wo contrast show no vascular occlusion, multiple foci of hyperintensity in deep cerebral periventricular white matter in pattern of demyelinating process.  5/17: MRI spine performed, no spinal cord lesions. Hospitalization 6/2017. EEG was performed negative for seizures.  Repeat MRI showing new lesion, question whether this was demyelination versus CVA. 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 Findings compatible with a small acute lacunar infarct adjacent to the left frontal horn.  Nonspecific enhancement just inferior to this region and extending into the left basal ganglia, as well as within the inferior aspect of the left cerebellum.  No evidence of a focal mass. 2.  Extensive increased signal intensity involving the periventricular white matter compatible with demyelinating disease versus vasculitis. 3.  Clinical correlation and followup recommended. 4.  This reportedly flattened in the EPIC and the corpus callosum  medical record system.    Mixed hyperlipidemia 11/9/2012    NAFLD (nonalcoholic fatty liver disease) 10/11/2013    CHASE (nonalcoholic steatohepatitis)     Obesity     Stroke     Type 2 diabetes mellitus with diabetic polyneuropathy, with long-term current use of insulin 5/14/2017    Type 2 diabetes mellitus with hyperglycemia, with long-term current use of insulin 10/11/2013     Past Surgical History:   Procedure Laterality Date    BIOPSY LIVER AND ULTRASOUND N/A 6/9/2015    Performed by [facility] Diagnostic Provider at [facility] OR 2ND FLR    COLONOSCOPY N/A 5/21/2019    Performed by [facility] at [facility] ENDO    COLONOSCOPY N/A 2/21/2014    Performed by Mario Aceves [facility] at [facility] ENDO    ESOPHAGOGASTRODUODENOSCOPY (EGD) N/A 5/29/2017    Performed by Hai Carter MD at [facility] ENDO (2ND FLR)    COWNBFOZE-SSHE-I-CATH N/A 12/7/2018    Performed by [facility] Diagnostic Provider at [facility] OR 2ND FLR    SKIN CANCER EXCISION       Family History   Problem Relation Age of Onset    Heart disease Mother     Hypertension Mother     Heart failure Mother     Cataracts Mother     Cancer Father         lung    Diabetes Maternal Aunt     Stroke Sister     Rheum arthritis Paternal Grandmother     Amblyopia Neg Hx     Blindness Neg Hx     Glaucoma Neg Hx     Macular degeneration Neg Hx     Retinal detachment Neg Hx     Strabismus Neg Hx      Social History     Tobacco Use    Smoking status: Never Smoker    Smokeless tobacco: Never Used   Substance Use Topics    Alcohol use: No     Alcohol/week: 0.0 oz    Drug use: No     Review of Systems   Constitutional: Negative for fever.   HENT: Negative for sore throat.    Respiratory: Negative for shortness of breath.    Cardiovascular: Negative for chest pain.   Gastrointestinal: Negative for nausea.   Genitourinary: Negative for dysuria.   Musculoskeletal: Negative for back pain.   Skin: Negative for rash.   Neurological: Positive for syncope and weakness.    Hematological: Does not bruise/bleed easily.       Physical Exam     Initial Vitals [06/06/19 1422]   BP Pulse Resp Temp SpO2   -- -- -- 98.1 °F (36.7 °C) --      MAP       --         Physical Exam    Nursing note and vitals reviewed.  Constitutional: He appears well-developed and well-nourished.   HENT:   Head: Normocephalic and atraumatic.   Eyes: EOM are normal. Pupils are equal, round, and reactive to light.   Neck: Normal range of motion. Neck supple.   Cardiovascular: Normal rate.   Pulmonary/Chest: Breath sounds normal.   Abdominal: Soft.   Musculoskeletal: Normal range of motion.   Neurological: He is alert and oriented to person, place, and time.   Skin: Skin is warm and dry.   Psychiatric: He has a normal mood and affect. Thought content normal.         ED Course   Procedures  Labs Reviewed   POCT GLUCOSE - Abnormal; Notable for the following components:       Result Value    POCT Glucose 174 (*)     All other components within normal limits   CBC W/ AUTO DIFFERENTIAL   COMPREHENSIVE METABOLIC PANEL   URINALYSIS, REFLEX TO URINE CULTURE   CK   CK-MB   TROPONIN I   POCT GLUCOSE MONITORING CONTINUOUS          Imaging Results    None         patient appears to have been somewhat volume depleted, has responded quite well to IV fluid therapy.  Nowl of normal mental status and ready for discharge.                       Clinical Impression:       ICD-10-CM ICD-9-CM   1. Dizziness R42 780.4         Disposition:   Disposition: Discharged  Condition: Stable                        Jamaal Mark Jr., MD  06/06/19 [id number]

## 2019-06-20 RX ORDER — SODIUM CHLORIDE 0.9 % (FLUSH) 0.9 %
10 SYRINGE (ML) INJECTION
Status: CANCELLED | OUTPATIENT
Start: 2019-06-21

## 2019-06-20 RX ORDER — HEPARIN 100 UNIT/ML
500 SYRINGE INTRAVENOUS
Status: CANCELLED | OUTPATIENT
Start: 2019-06-21

## 2019-06-21 ENCOUNTER — CLINICAL SUPPORT (OUTPATIENT)
Dept: HEMATOLOGY/ONCOLOGY | Facility: CLINIC | Age: 59
End: 2019-06-21
Payer: MEDICARE

## 2019-06-21 ENCOUNTER — INFUSION (OUTPATIENT)
Dept: INFUSION THERAPY | Facility: HOSPITAL | Age: 59
End: 2019-06-21
Attending: INTERNAL MEDICINE
Payer: MEDICARE

## 2019-06-21 ENCOUNTER — TELEPHONE (OUTPATIENT)
Dept: PSYCHIATRY | Facility: CLINIC | Age: 59
End: 2019-06-21

## 2019-06-21 ENCOUNTER — OFFICE VISIT (OUTPATIENT)
Dept: HEMATOLOGY/ONCOLOGY | Facility: CLINIC | Age: 59
End: 2019-06-21
Payer: MEDICARE

## 2019-06-21 VITALS
OXYGEN SATURATION: 99 % | HEIGHT: 70 IN | BODY MASS INDEX: 31.07 KG/M2 | WEIGHT: 217 LBS | HEART RATE: 78 BPM | RESPIRATION RATE: 18 BRPM | TEMPERATURE: 98 F | SYSTOLIC BLOOD PRESSURE: 140 MMHG | DIASTOLIC BLOOD PRESSURE: 78 MMHG

## 2019-06-21 VITALS
TEMPERATURE: 98 F | SYSTOLIC BLOOD PRESSURE: 145 MMHG | WEIGHT: 213.63 LBS | OXYGEN SATURATION: 97 % | WEIGHT: 217.63 LBS | HEIGHT: 70 IN | BODY MASS INDEX: 30.58 KG/M2 | HEART RATE: 73 BPM | DIASTOLIC BLOOD PRESSURE: 80 MMHG | BODY MASS INDEX: 31.16 KG/M2 | HEIGHT: 70 IN

## 2019-06-21 DIAGNOSIS — I77.6 CNS VASCULITIS: ICD-10-CM

## 2019-06-21 DIAGNOSIS — I77.6 CNS VASCULITIS: Primary | ICD-10-CM

## 2019-06-21 DIAGNOSIS — E11.42 TYPE 2 DIABETES MELLITUS WITH DIABETIC POLYNEUROPATHY, WITH LONG-TERM CURRENT USE OF INSULIN: ICD-10-CM

## 2019-06-21 DIAGNOSIS — D64.81 ANEMIA ASSOCIATED WITH CHEMOTHERAPY: ICD-10-CM

## 2019-06-21 DIAGNOSIS — K52.1 CHEMOTHERAPY INDUCED DIARRHEA: ICD-10-CM

## 2019-06-21 DIAGNOSIS — Z71.3 NUTRITIONAL COUNSELING: Primary | ICD-10-CM

## 2019-06-21 DIAGNOSIS — N17.9 AKI (ACUTE KIDNEY INJURY): ICD-10-CM

## 2019-06-21 DIAGNOSIS — Z09 CHEMOTHERAPY FOLLOW-UP EXAMINATION: ICD-10-CM

## 2019-06-21 DIAGNOSIS — T45.1X5A ANEMIA ASSOCIATED WITH CHEMOTHERAPY: ICD-10-CM

## 2019-06-21 DIAGNOSIS — I95.1 ORTHOSTATIC HYPOTENSION: ICD-10-CM

## 2019-06-21 DIAGNOSIS — T45.1X5A CHEMOTHERAPY INDUCED DIARRHEA: ICD-10-CM

## 2019-06-21 DIAGNOSIS — I10 ESSENTIAL HYPERTENSION: ICD-10-CM

## 2019-06-21 DIAGNOSIS — Z79.4 TYPE 2 DIABETES MELLITUS WITH DIABETIC POLYNEUROPATHY, WITH LONG-TERM CURRENT USE OF INSULIN: ICD-10-CM

## 2019-06-21 DIAGNOSIS — E78.2 MIXED HYPERLIPIDEMIA: ICD-10-CM

## 2019-06-21 DIAGNOSIS — D69.59 CHEMOTHERAPY-INDUCED THROMBOCYTOPENIA: ICD-10-CM

## 2019-06-21 DIAGNOSIS — T45.1X5A CHEMOTHERAPY-INDUCED THROMBOCYTOPENIA: ICD-10-CM

## 2019-06-21 LAB
ALBUMIN SERPL BCP-MCNC: 3.7 G/DL (ref 3.5–5.2)
ALP SERPL-CCNC: 68 U/L (ref 55–135)
ALT SERPL W/O P-5'-P-CCNC: 15 U/L (ref 10–44)
ANION GAP SERPL CALC-SCNC: 11 MMOL/L (ref 8–16)
AST SERPL-CCNC: 17 U/L (ref 10–40)
BASOPHILS # BLD AUTO: 0.03 K/UL (ref 0–0.2)
BASOPHILS NFR BLD: 0.5 % (ref 0–1.9)
BILIRUB SERPL-MCNC: 0.7 MG/DL (ref 0.1–1)
BUN SERPL-MCNC: 21 MG/DL (ref 8–23)
CALCIUM SERPL-MCNC: 9.6 MG/DL (ref 8.7–10.5)
CHLORIDE SERPL-SCNC: 107 MMOL/L (ref 95–110)
CO2 SERPL-SCNC: 23 MMOL/L (ref 23–29)
CREAT SERPL-MCNC: 1.5 MG/DL (ref 0.5–1.4)
DIFFERENTIAL METHOD: ABNORMAL
EOSINOPHIL # BLD AUTO: 0.1 K/UL (ref 0–0.5)
EOSINOPHIL NFR BLD: 1.9 % (ref 0–8)
ERYTHROCYTE [DISTWIDTH] IN BLOOD BY AUTOMATED COUNT: 14.9 % (ref 11.5–14.5)
EST. GFR  (AFRICAN AMERICAN): 57.3 ML/MIN/1.73 M^2
EST. GFR  (NON AFRICAN AMERICAN): 49.5 ML/MIN/1.73 M^2
GLUCOSE SERPL-MCNC: 209 MG/DL (ref 70–110)
HCT VFR BLD AUTO: 31.2 % (ref 40–54)
HGB BLD-MCNC: 10.5 G/DL (ref 14–18)
IMM GRANULOCYTES # BLD AUTO: 0.04 K/UL (ref 0–0.04)
IMM GRANULOCYTES NFR BLD AUTO: 0.6 % (ref 0–0.5)
LYMPHOCYTES # BLD AUTO: 0.8 K/UL (ref 1–4.8)
LYMPHOCYTES NFR BLD: 12.8 % (ref 18–48)
MCH RBC QN AUTO: 28.6 PG (ref 27–31)
MCHC RBC AUTO-ENTMCNC: 33.7 G/DL (ref 32–36)
MCV RBC AUTO: 85 FL (ref 82–98)
MONOCYTES # BLD AUTO: 0.5 K/UL (ref 0.3–1)
MONOCYTES NFR BLD: 7.5 % (ref 4–15)
NEUTROPHILS # BLD AUTO: 4.8 K/UL (ref 1.8–7.7)
NEUTROPHILS NFR BLD: 76.7 % (ref 38–73)
NRBC BLD-RTO: 0 /100 WBC
PLATELET # BLD AUTO: 101 K/UL (ref 150–350)
PMV BLD AUTO: 10 FL (ref 9.2–12.9)
POTASSIUM SERPL-SCNC: 3.6 MMOL/L (ref 3.5–5.1)
PROT SERPL-MCNC: 6.8 G/DL (ref 6–8.4)
RBC # BLD AUTO: 3.67 M/UL (ref 4.6–6.2)
SODIUM SERPL-SCNC: 141 MMOL/L (ref 136–145)
WBC # BLD AUTO: 6.26 K/UL (ref 3.9–12.7)

## 2019-06-21 PROCEDURE — 99999 PR PBB SHADOW E&M-EST. PATIENT-LVL II: CPT | Mod: PBBFAC,,, | Performed by: DIETITIAN, REGISTERED

## 2019-06-21 PROCEDURE — 25000003 PHARM REV CODE 250: Performed by: NURSE PRACTITIONER

## 2019-06-21 PROCEDURE — 3008F PR BODY MASS INDEX (BMI) DOCUMENTED: ICD-10-PCS | Mod: CPTII,S$GLB,, | Performed by: NURSE PRACTITIONER

## 2019-06-21 PROCEDURE — 3079F PR MOST RECENT DIASTOLIC BLOOD PRESSURE 80-89 MM HG: ICD-10-PCS | Mod: CPTII,S$GLB,, | Performed by: NURSE PRACTITIONER

## 2019-06-21 PROCEDURE — 3044F HG A1C LEVEL LT 7.0%: CPT | Mod: CPTII,S$GLB,, | Performed by: NURSE PRACTITIONER

## 2019-06-21 PROCEDURE — 3044F PR MOST RECENT HEMOGLOBIN A1C LEVEL <7.0%: ICD-10-PCS | Mod: CPTII,S$GLB,, | Performed by: NURSE PRACTITIONER

## 2019-06-21 PROCEDURE — 99214 PR OFFICE/OUTPT VISIT, EST, LEVL IV, 30-39 MIN: ICD-10-PCS | Mod: S$GLB,,, | Performed by: NURSE PRACTITIONER

## 2019-06-21 PROCEDURE — 99999 PR PBB SHADOW E&M-EST. PATIENT-LVL IV: ICD-10-PCS | Mod: PBBFAC,,, | Performed by: NURSE PRACTITIONER

## 2019-06-21 PROCEDURE — 96367 TX/PROPH/DG ADDL SEQ IV INF: CPT

## 2019-06-21 PROCEDURE — 99999 PR PBB SHADOW E&M-EST. PATIENT-LVL II: ICD-10-PCS | Mod: PBBFAC,,, | Performed by: DIETITIAN, REGISTERED

## 2019-06-21 PROCEDURE — 63600175 PHARM REV CODE 636 W HCPCS: Performed by: INTERNAL MEDICINE

## 2019-06-21 PROCEDURE — 97802 PR MED NUTR THER, 1ST, INDIV, EA 15 MIN: ICD-10-PCS | Mod: S$GLB,,, | Performed by: DIETITIAN, REGISTERED

## 2019-06-21 PROCEDURE — 3074F SYST BP LT 130 MM HG: CPT | Mod: CPTII,S$GLB,, | Performed by: NURSE PRACTITIONER

## 2019-06-21 PROCEDURE — 3079F DIAST BP 80-89 MM HG: CPT | Mod: CPTII,S$GLB,, | Performed by: NURSE PRACTITIONER

## 2019-06-21 PROCEDURE — 96361 HYDRATE IV INFUSION ADD-ON: CPT

## 2019-06-21 PROCEDURE — 3008F BODY MASS INDEX DOCD: CPT | Mod: CPTII,S$GLB,, | Performed by: NURSE PRACTITIONER

## 2019-06-21 PROCEDURE — 80053 COMPREHEN METABOLIC PANEL: CPT

## 2019-06-21 PROCEDURE — A4216 STERILE WATER/SALINE, 10 ML: HCPCS | Performed by: INTERNAL MEDICINE

## 2019-06-21 PROCEDURE — 99999 PR PBB SHADOW E&M-EST. PATIENT-LVL IV: CPT | Mod: PBBFAC,,, | Performed by: NURSE PRACTITIONER

## 2019-06-21 PROCEDURE — 3074F PR MOST RECENT SYSTOLIC BLOOD PRESSURE < 130 MM HG: ICD-10-PCS | Mod: CPTII,S$GLB,, | Performed by: NURSE PRACTITIONER

## 2019-06-21 PROCEDURE — 99214 OFFICE O/P EST MOD 30 MIN: CPT | Mod: S$GLB,,, | Performed by: NURSE PRACTITIONER

## 2019-06-21 PROCEDURE — 96413 CHEMO IV INFUSION 1 HR: CPT

## 2019-06-21 PROCEDURE — 25000003 PHARM REV CODE 250: Performed by: INTERNAL MEDICINE

## 2019-06-21 PROCEDURE — 96376 TX/PRO/DX INJ SAME DRUG ADON: CPT

## 2019-06-21 PROCEDURE — 85025 COMPLETE CBC W/AUTO DIFF WBC: CPT

## 2019-06-21 PROCEDURE — 97802 MEDICAL NUTRITION INDIV IN: CPT | Mod: S$GLB,,, | Performed by: DIETITIAN, REGISTERED

## 2019-06-21 PROCEDURE — 99499 RISK ADDL DX/OHS AUDIT: ICD-10-PCS | Mod: S$GLB,,, | Performed by: NURSE PRACTITIONER

## 2019-06-21 PROCEDURE — 99499 UNLISTED E&M SERVICE: CPT | Mod: S$GLB,,, | Performed by: NURSE PRACTITIONER

## 2019-06-21 RX ORDER — ONDANSETRON HCL IN 0.9 % NACL 8 MG/50 ML
8 INTRAVENOUS SOLUTION, PIGGYBACK (ML) INTRAVENOUS
Status: CANCELLED | OUTPATIENT
Start: 2019-06-28

## 2019-06-21 RX ORDER — PROCHLORPERAZINE MALEATE 10 MG
10 TABLET ORAL
Status: CANCELLED | OUTPATIENT
Start: 2019-06-28

## 2019-06-21 RX ORDER — HEPARIN 100 UNIT/ML
500 SYRINGE INTRAVENOUS
Status: DISCONTINUED | OUTPATIENT
Start: 2019-06-21 | End: 2019-06-21 | Stop reason: HOSPADM

## 2019-06-21 RX ORDER — HEPARIN 100 UNIT/ML
500 SYRINGE INTRAVENOUS
Status: CANCELLED | OUTPATIENT
Start: 2019-06-28

## 2019-06-21 RX ORDER — SODIUM CHLORIDE 0.9 % (FLUSH) 0.9 %
10 SYRINGE (ML) INJECTION
Status: CANCELLED | OUTPATIENT
Start: 2019-06-21

## 2019-06-21 RX ORDER — SODIUM CHLORIDE 0.9 % (FLUSH) 0.9 %
10 SYRINGE (ML) INJECTION
Status: DISCONTINUED | OUTPATIENT
Start: 2019-06-21 | End: 2019-06-21 | Stop reason: HOSPADM

## 2019-06-21 RX ORDER — SODIUM CHLORIDE 450 MG/100ML
INJECTION, SOLUTION INTRAVENOUS ONCE
Status: CANCELLED
Start: 2019-06-28

## 2019-06-21 RX ORDER — SODIUM CHLORIDE 0.9 % (FLUSH) 0.9 %
10 SYRINGE (ML) INJECTION
Status: CANCELLED | OUTPATIENT
Start: 2019-06-28

## 2019-06-21 RX ORDER — HEPARIN 100 UNIT/ML
500 SYRINGE INTRAVENOUS
Status: CANCELLED | OUTPATIENT
Start: 2019-06-21

## 2019-06-21 RX ORDER — HEPARIN 100 UNIT/ML
500 SYRINGE INTRAVENOUS
Status: COMPLETED | OUTPATIENT
Start: 2019-06-21 | End: 2019-06-21

## 2019-06-21 RX ORDER — SODIUM CHLORIDE 0.9 % (FLUSH) 0.9 %
10 SYRINGE (ML) INJECTION
Status: COMPLETED | OUTPATIENT
Start: 2019-06-21 | End: 2019-06-21

## 2019-06-21 RX ADMIN — Medication 10 ML: at 12:06

## 2019-06-21 RX ADMIN — SODIUM CHLORIDE: 0.9 INJECTION, SOLUTION INTRAVENOUS at 04:06

## 2019-06-21 RX ADMIN — Medication 10 ML: at 05:06

## 2019-06-21 RX ADMIN — HEPARIN 500 UNITS: 100 SYRINGE at 12:06

## 2019-06-21 RX ADMIN — HEPARIN 500 UNITS: 100 SYRINGE at 05:06

## 2019-06-21 RX ADMIN — PALONOSETRON HYDROCHLORIDE: 0.25 INJECTION, SOLUTION INTRAVENOUS at 02:06

## 2019-06-21 RX ADMIN — MESNA 1500 MG: 100 INJECTION, SOLUTION INTRAVENOUS at 03:06

## 2019-06-21 RX ADMIN — CYCLOPHOSPHAMIDE 1500 MG: 1 INJECTION, POWDER, FOR SOLUTION INTRAVENOUS; ORAL at 03:06

## 2019-06-21 NOTE — NURSING
1240- Patient arrived on the unit in a wheelchair to have labs drawn from port prior to treatment.  Port flushes easily with good blood return, needle left in place for treatment later.  Patient discharged to next appointment.

## 2019-06-21 NOTE — TELEPHONE ENCOUNTER
Left voicemail for pt's wife.    ----- Message from Cleveland French, PhD sent at 6/21/2019 11:57 AM CDT -----  Regarding: FW: Needs appt  Hi Sharda-  I have never seen this patient, so I think they may be confusing me for you. It looks like they may wish to schedule an appt with you or their psychiatrist.  Thanks!  Cleveland French  ----- Message -----  From: Yarelis Sanchez RD  Sent: 6/21/2019  11:28 AM  To: Cleveland French, PhD  Subject: Needs appt                                       Good morning,  Mr. Nunez and his wife are interested in setting up an appt with you. They said they have met with you in the past. Pt is sleeping 20 hours/day, doesn't get out of bed, doesn't do much for himself--taking Zoloft. Hoping you can reassess his needs.    Thanks,  Bo

## 2019-06-21 NOTE — PROGRESS NOTES
"Medical Nutrition Therapy Oncology Progress Note    Name: Bessy Nunez MRN: [id number]  : 1960    Age: 59 y.o.  Ethnicity: /White Language: English    Diagnosis: No diagnosis found.    Chemo Regimen: Cytoxan   Referring MD: Dr. Goldstein Frequency:  Radiation:            Goal of Cancer treatment          Nutrition Assessment     Chief Complaint:   Chief Complaint   Patient presents with    Nutrition Counseling        Anthropometric Measurements:  Height: 5' 10" (1.778 m)  Current Weight: 96.9 kg (213 lb 10 oz)  Ideal Body Weight: 166#  Percent Ideal Body Weight:: 128%  BMI: Body mass index is 30.65 kg/m².     Weight History:   Wt Readings from Last 8 Encounters:   19 96.9 kg (213 lb 10 oz)   19 99.6 kg (219 lb 9.3 oz)   19 99.6 kg (219 lb 9.3 oz)   19 99.3 kg (219 lb)   19 99.5 kg (219 lb 5.7 oz)   19 99 kg (218 lb 5.9 oz)   19 99.9 kg (220 lb 3.8 oz)   19 99.9 kg (220 lb 3.8 oz)     Medical Health History:  Feeding tube placed: No  Pre-treatment: No    Past Surgical History:   Procedure Laterality Date    BIOPSY LIVER AND ULTRASOUND N/A 2015    Performed by [facility] Provider at [facility] OR 2ND FLR    COLONOSCOPY N/A 2019    Performed by [facility]encio [facility] at [facility] ENDO    COLONOSCOPY N/A 2014    Performed by Mario Aceves MD at [facility] ENDO    ESOPHAGOGASTRODUODENOSCOPY (EGD) N/A 2017    Performed by Hai Carter MD at [facility] ENDO (2ND FLR)    DVCIEXRFT-PRNW-F-CATH N/A 2018    [facility]    SKIN CANCER EXCISION          Medications:   Current Outpatient Medications:     alclomethasone (ACLOVATE) 0.05 % cream, , Disp: , Rfl:     alendronate (FOSAMAX) 70 MG tablet, Take 1 tablet (70 mg total) by mouth every 7 days., Disp: 4 tablet, Rfl: 11    aspirin (ECOTRIN) 81 MG EC tablet, Take 81 mg by mouth once daily., Disp: , Rfl:     atenolol (TENORMIN) 50 MG tablet, Take 1 tablet " (50 mg total) by mouth once daily., Disp: 90 tablet, Rfl: 3    atorvastatin (LIPITOR) 40 MG tablet, Take 1 tablet (40 mg total) by mouth once daily., Disp: 90 tablet, Rfl: 3    blood sugar diagnostic (TRUE METRIX GLUCOSE TEST STRIP) Strp, Test blood sugar four times a day, Disp: 400 each, Rfl: 11    diltiaZEM (DILACOR XR) 240 MG CDCR, Take 1 capsule (240 mg total) by mouth once daily., Disp: 90 capsule, Rfl: 3    flash glucose sensor (FREESTYLE ARELY 14 DAY SENSOR) Kit, Glucose checking 4 times a day, Disp: 2 kit, Rfl: 11    FREESTYLE ARELY 14 DAY READER Misc, GLUCOSE TESTING : FASTING AND PRIOR TO MEALS, Disp: 1 each, Rfl: 0    insulin (BASAGLAR KWIKPEN U-100 INSULIN) glargine 100 units/mL (3mL) SubQ pen, Inject 40 Units into the skin 2 (two) times daily. Up to 60 BID with cytoxan, Disp: 2 Box, Rfl: 11    insulin aspart U-100 (NOVOLOG) 100 unit/mL InPn pen, Inject 20 Units into the skin 3 (three) times daily with meals. (Patient taking differently: Inject 20 Units into the skin 4 (four) times daily. ), Disp: 18 mL, Rfl: 11    insulin syringe-needle U-100 (INSULIN SYRINGE) 1/2 mL 30 gauge x 5/16 Syrg, Use 3x/day, Disp: 300 each, Rfl: 3    irbesartan (AVAPRO) 300 MG tablet, Take 1 tablet (300 mg total) by mouth every evening., Disp: 90 tablet, Rfl: 3    ketoconazole (NIZORAL) 2 % cream, , Disp: , Rfl:     lancets 30 gauge Misc, Test blood sugar four times a day, Disp: 100 each, Rfl: 11    liraglutide 0.6 mg/0.1 mL, 18 mg/3 mL, subq PNIJ (VICTOZA 3-TOMASZ) 0.6 mg/0.1 mL (18 mg/3 mL) PnIj, Inject 1.8 mg into the skin once daily., Disp: 9 mL, Rfl: 11    metFORMIN (GLUCOPHAGE-XR) 500 MG 24 hr tablet, Take 2 tablets (1,000 mg total) by mouth 2 (two) times daily with meals., Disp: 360 tablet, Rfl: 3    methylphenidate HCl (RITALIN) 5 MG tablet, Take 1 tablet (5 mg total) by mouth once daily., Disp: 90 tablet, Rfl: 0    omega-3 fatty acids-vitamin E (FISH OIL) 1,000 mg Cap, Take 1 capsule by mouth 2 (two) times  "daily., Disp: , Rfl: 0    pen needle, diabetic (BD ULTRA-FINE ANA PEN NEEDLE) 32 gauge x 5/32" Ndle, 4x daily insulin pen needle, relion, Disp: 400 each, Rfl: 3    QUEtiapine (SEROQUEL) 25 MG Tab, Take 1 tablet (25 mg total) by mouth every evening., Disp: 30 tablet, Rfl: 11    sertraline (ZOLOFT) 100 MG tablet, Take 1 tab and half (total 150mg) daily, Disp: 45 tablet, Rfl: 1    VICTOZA 2-TOMASZ 0.6 mg/0.1 mL (18 mg/3 mL) PnIj, INJECT 1.8 MG INTO THE SKIN ONCE DAILY, Disp: 9 Syringe, Rfl: 1    vitamin D 1000 units Tab, Take 5 tablets (5,000 Units total) by mouth once daily., Disp: , Rfl:   No current facility-administered medications for this visit.     Last Labs:  Last Labs:  Glucose   Date Value Ref Range Status   06/06/2019 171 (H) 70 - 110 mg/dL Final   05/24/2019 232 (H) 70 - 110 mg/dL Final     BUN, Bld   Date Value Ref Range Status   06/06/2019 22 8 - 23 mg/dL Final   05/24/2019 15 8 - 23 mg/dL Final     Creatinine   Date Value Ref Range Status   06/06/2019 1.5 (H) 0.5 - 1.4 mg/dL Final   05/24/2019 1.2 0.5 - 1.4 mg/dL Final     Sodium   Date Value Ref Range Status   06/06/2019 140 136 - 145 mmol/L Final   05/24/2019 140 136 - 145 mmol/L Final     Potassium   Date Value Ref Range Status   06/06/2019 3.6 3.5 - 5.1 mmol/L Final   05/24/2019 4.0 3.5 - 5.1 mmol/L Final     Phosphorus   Date Value Ref Range Status   02/27/2019 4.2 2.7 - 4.5 mg/dL Final   10/20/2018 3.6 2.7 - 4.5 mg/dL Final     Calcium   Date Value Ref Range Status   06/06/2019 9.7 8.7 - 10.5 mg/dL Final   05/24/2019 9.5 8.7 - 10.5 mg/dL Final     No results found for: PREALBUMIN  Total Protein   Date Value Ref Range Status   06/06/2019 7.0 6.0 - 8.4 g/dL Final   05/24/2019 6.7 6.0 - 8.4 g/dL Final     Cholesterol   Date Value Ref Range Status   04/01/2019 174 120 - 199 mg/dL Final     Comment:     The National Cholesterol Education Program (NCEP) has set the  following guidelines (reference ranges) for " Cholesterol:  Optimal.....................<200 mg/dL  Borderline High.............200-239 mg/dL  High........................> or = 240 mg/dL     02/09/2018 165 120 - 199 mg/dL Final     Comment:     The National Cholesterol Education Program (NCEP) has set the  following guidelines (reference ranges) for Cholesterol:  Optimal.....................<200 mg/dL  Borderline High.............200-239 mg/dL  High........................> or = 240 mg/dL       Hemoglobin A1C   Date Value Ref Range Status   04/01/2019 6.2 (H) 4.0 - 5.6 % Final     Comment:     ADA Screening Guidelines:  5.7-6.4%  Consistent with prediabetes  >or=6.5%  Consistent with diabetes  High levels of fetal hemoglobin interfere with the HbA1C  assay. Heterozygous hemoglobin variants (HbS, HgC, etc)do  not significantly interfere with this assay.   However, presence of multiple variants may affect accuracy.     10/15/2018 6.2 (H) 4.0 - 5.6 % Final     Comment:     ADA Screening Guidelines:  5.7-6.4%  Consistent with prediabetes  >or=6.5%  Consistent with diabetes  High levels of fetal hemoglobin interfere with the HbA1C  assay. Heterozygous hemoglobin variants (HbS, HgC, etc)do  not significantly interfere with this assay.   However, presence of multiple variants may affect accuracy.       Hemoglobin   Date Value Ref Range Status   06/06/2019 10.9 (L) 14.0 - 18.0 g/dL Final   05/24/2019 10.3 (L) 14.0 - 18.0 g/dL Final     Hematocrit   Date Value Ref Range Status   06/06/2019 32.0 (L) 40.0 - 54.0 % Final   05/24/2019 30.8 (L) 40.0 - 54.0 % Final     Iron   Date Value Ref Range Status   05/26/2017 66 45 - 160 ug/dL Final     No components found for: FROLATE  Vit D, 25-Hydroxy   Date Value Ref Range Status   03/04/2019 47 30 - 96 ng/mL Final     Comment:     Vitamin D deficiency.........<10 ng/mL                              Vitamin D insufficiency......10-29 ng/mL       Vitamin D sufficiency........> or equal to 30 ng/mL  Vitamin D toxicity............>100  ng/mL     08/15/2018 36 30 - 96 ng/mL Final     Comment:     Vitamin D deficiency.........<10 ng/mL                              Vitamin D insufficiency......10-29 ng/mL       Vitamin D sufficiency........> or equal to 30 ng/mL  Vitamin D toxicity............>100 ng/mL       WBC   Date Value Ref Range Status   06/06/2019 1.94 (LL) 3.90 - 12.70 K/uL Final     Comment:     Panic WBC result called to Dolly Almeida RN, 06/06/2019 14:58   05/24/2019 7.28 3.90 - 12.70 K/uL Final         Client History/Food Access:    Living Situation: Lives with spouse   Who: Shops for Groceries? Spouse   Who : Prepares meals? Spouse   Who: Fills prescriptions? Spouse   Are there financial difficulties purchasing food? No   Personal History (occupation, physical activity level, exercise): Sleeps for 20 hours/day     Baseline for Outcomes Monitoring  Food and Nutrition History: Pt here with wife for nutrition counseling. Current weight of 213# which is a 7# weight loss in 1 month. Pt has good appetite, but only eats 1-2 times/day. Pt sleeps for 20 hours/day, rarely gets out of bed. Only gets out of bed to eat, but only eats if food is prepared, warmed, and set in front of him. Wife is frustrated and overwhelmed with having to take care of patient. Encouraged pt to increase activity and schedule regular meal times. Stressed importance of adequate intake to prevent further unintentional weight loss. Also, stressed importance of adequate hydration as wife reports pt of visits ED for dehydration. Encouraged pt and wife to develop a schedule as to when to get up, when to eat, when to take medications, etc. Wife tries to wake pt to give medications, but if she doesn't watch him take his meds, he hides them under the bedside table. Pt, wife, and son attend family counseling monthly. Pt occasionally has diarrhea which is controlled with Imodium. Otherwise, I feel most of these issues are stemming from depression and encouraged pt to schedule  appt with psychiatrist (has one here at [facility]). Reviewed medications, supplement/herbal intake and no food/med interactions noted. Lab results noted. Compliance is poor. Comprehension is fair.  24-hour recall:  Breakfast: skips  Lunch: beef stew  Dinner: pasta    Nutritional Needs:  Estimated Needs Method Use    2000 kcal/day [] Predictive Equation: Escamilla-Macomb   [x]  25 kcal/kg (adjusted)   Protein 90 g 1.0 gm/kg/day   Fluid 2000 ml 25 ml/kg/day     Food/Nutrient Intake (oral, enteral or parenteral) and Patient Behaviors     Calorie intake: Inadequate   Protein intake: Inadequate     Yes/No    N/A Uses medical food supplements   Yes Cooking techniques to minimize fatigue   N/A Currently modifying food textures   No Able to maintain usual physical actiivty     Nutrition Diagnosis     Nutrition Diagnosis Related to (Etiology) As Evidenced By (Signs/Symptoms)   Disordered eating pattern Psychological causes Depression; sleeping 20 hours/day; no energy to eat unless food brought to him                 Nutritional Intervention and Prescription        Nutrition Intervention Schedule of food/fluids   Goals/Expected Outcomes Pt to eat at least 3 meals/day at regular time intervals especially in setting of diabetes with insulin.   Progress Initial     Nutrition Intervention Other : exercise   Goals/Expected Outcomes Pt to get out of bed every 4 hours during the day and walk for 5 minutes   Progress Initial     Nutrition Intervention Fluid-modified diet   Goals/Expected Outcomes Pt to maintain hydration by drinking water every 4 hours as well.   Progress Initial     Pt needs education? yes (see intervention)    Coordination of Nutrition Care: Comments:   Collaboration with other providers MD         Monitoring and Evaluation     Monitor: diet education needs    Next Visit: PRN    Total time: 30 minutes    Nutrition Score:      ©2010 [employer] Oncology Toolkit

## 2019-06-21 NOTE — Clinical Note
- CBC and CMP in 1 week at [facility]) (lab collect).- cbc, cmp, lab only in 2 weeks at [facility]) (lab collect).- CBC, CMP, NP appt or Dr Goldstein, & cytoxan infusion in 4 weeks (clinic collect labs from [facility]).

## 2019-06-27 NOTE — ASSESSMENT & PLAN NOTE
-This is followed up by Dr Love (patient has follow up appointment with her Wednesday)  -He is on Cytoxan chronically for this condition  -Wife is requesting patient be placed back on Prednisone. I am not inclined to do so acutely unless MRI shows new changes. Otherwise, this can be further discussed with Dr Byrd at his pending outpatient visit    
-due to vasculitis  -Recent EEG shows FIRDA consistent with this.   -Continue Keppra for seizure prophylaxis. Level pending  
-no syncope or seizures reported by son who witnessed the event of patient being briefly more confused than baseline  - Check MRI brain to be sure no new vasculitis changes.  -Otherwise, this spell could have been temporary worsening of his known encephalopathy with vasculitis upon waking from sleep. There has been a caregiver change recently and case management is looking into placement for this patient long term.   
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     
Cont home atorvastatin    
Cont home diltiazem, valsartan, atenolol  Stop IVF      
Cont home effexor    
Cont home insulin  Son states was on victoza as outpatient but ran out, will refill at discharge  No reported h/o hypoglycemia, monitor here    
See above  
See above  
This is a chronmic condition. Today he was much more alert and oriented than yesterday. We discussed long term placement with wife,. Son and patient. Patient refuses.  has discussed that patient needs 24hr care. Info given for sitters, etc will resume home health as well upon d/c. F/u already made with Dr Alvarenga in am    
This is a chronmic condition. Today he was much more alert and oriented than yesterday. Yesterday  discussed long term placement with wife,. Son and patient. Patient refuses to go to NH .  has discussed that patient needs 24hr care. Info given for sitters, etc will resume home health as well upon d/c. F/u already made with Dr Alvarenga in am    
Woke up from nap disoriented--NO SYNCOPE reported by son who witnessed the event  Per son back to baseline now  Blood sugar was normal at that time  Checking keppra level  Cont telemetry but NSR since admit  Trending trop but thus far negative  Otherwise no metabolic cause found    Will have Dr. Leighton hsu to ensure no other imaging is needed but with stable CT head and recent stable MRA I don't feel strongly for imaging at this time.    However, he is certainly not safe to discharge home alone. Wife and son both state they can not care for him at home. SW/HECTOR working on placement.     
Woke up from nap disoriented--NO SYNCOPE reported by son who witnessed the event  Per son back to baseline now  Blood sugar was normal at that time  Checking keppra level  Cont telemetry but NSR since admit  Trending trop but thus far negative  Otherwise no metabolic cause found    Will have Dr. Leighton hsu to ensure no other imaging is needed but with stable CT head and recent stable MRA I don't feel strongly for imaging at this time. Did MRI nothing new    However, he is certainly not safe to discharge home alone. Wife and son both state they can not care for him at home. SW/CM working on placement. Pt refuses. Given info on 24hr care    
Woke up from nap disoriented--NO SYNCOPE reported by son who witnessed the event  Per son back to baseline now  Blood sugar was normal at that time  Checking keppra level  Cont telemetry but NSR since admit  Trending trop but thus far negative  Otherwise no metabolic cause found    Will have Dr. Leighton hsu to ensure no other imaging is needed but with stable CT head and recent stable MRA I don't feel strongly for imaging at this time. Did MRI nothing new    However, he is certainly not safe to discharge home alone. Wife and son both state they can not care for him at home. SW/CM working on placement. Pt refuses. Given info on 24hr care    
93

## 2019-06-29 DIAGNOSIS — F32.5 MAJOR DEPRESSIVE DISORDER WITH SINGLE EPISODE, IN FULL REMISSION: Chronic | ICD-10-CM

## 2019-06-30 RX ORDER — SERTRALINE HYDROCHLORIDE 100 MG/1
TABLET, FILM COATED ORAL
Qty: 45 TABLET | Refills: 1 | Status: SHIPPED | OUTPATIENT
Start: 2019-06-30 | End: 2019-10-05 | Stop reason: SDUPTHER

## 2019-07-15 NOTE — PROGRESS NOTES
SECTION OF HEMATOLOGY AND BONE MARROW TRANSPLANT    07/18/2019  Referred by:  Dr. Love  Referred for: Cytoxan    HISTORY OF PRESENT ILLNESS:   Mr. Nunez presents to clinic today with his wife for Cytoxan infusion for CNS Vasculitis. This will be dose #24. Cytoxan was stopped in 2018, and neurologic symptoms worsened. Since resuming Cytoxan, neurologic symptoms have improved. He was last seen by neurologist 4/22/19. He has a follow-up with neurology next week. He has tolerated Cytoxan with only minor complications, including brief periods of neutropenia and occasional diarrhea. Cytoxan is dose reduced due to crcl. He has no complaints today. He denies fevers, chills, night sweats, n/v/c/d, SOB, chest pain, neuropathy, and visual disturbances. Has intermittent headaches. His wife states that he has been sleeping a good bit and has been confused. She states that these symptoms improve with chemo. He is alert and oriented to person, place, and situation today, but is disoriented to time. He denies recent falls.       PAST MEDICAL HISTORY:   Past Medical History:   Diagnosis Date    Amblyopia     Cataract     CNS vasculitis 6/2/2017    Follows with Dr. Love By mri.  Several active lesions, many old. 5/13: MRA brain/neck, MRI brain w/wo contrast show no vascular occlusion, multiple foci of hyperintensity in deep cerebral periventricular white matter in pattern of demyelinating process.  5/17: MRI spine performed, no spinal cord lesions. Hospitalization 6/2017. EEG was performed negative for seizures.  Repeat MRI showing new lesion, question whether this was demyelination versus CVA. 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 Findings compatible with a small acute lacunar infarct adjacent to the left  frontal horn.  Nonspecific enhancement just inferior to this region and extending into the left basal ganglia, as well as within the inferior aspect of the left cerebellum.  No evidence of a focal mass. 2.  Extensive increased signal intensity involving the periventricular white matter compatible with demyelinating disease versus vasculitis. 3.  Clinical correlation and followup recommended. 4.  This reportedly flattened in the EPIC and the corpus callosum medical record system.    Mixed hyperlipidemia 11/9/2012    NAFLD (nonalcoholic fatty liver disease) 10/11/2013    CHASE (nonalcoholic steatohepatitis)     Obesity     Stroke     Type 2 diabetes mellitus with diabetic polyneuropathy, with long-term current use of insulin 5/14/2017    Type 2 diabetes mellitus with hyperglycemia, with long-term current use of insulin 10/11/2013       PAST SURGICAL HISTORY:   Past Surgical History:   Procedure Laterality Date    BIOPSY LIVER AND ULTRASOUND N/A 6/9/2015    Performed by [facility] Diagnostic Provider at [facility] OR 2ND FLR    COLONOSCOPY N/A 5/21/2019    Performed by Alex Ascencio [facility] at [facility] ENDO    COLONOSCOPY N/A 2/21/2014    Performed by Mario Aceves MD at [facility] ENDO    ESOPHAGOGASTRODUODENOSCOPY (EGD) N/A 5/29/2017    Performed by Hai Carter MD at [facility] ENDO (2ND FLR)    XCSDBOURR-HXBD-B-CATH N/A 12/7/2018    Performed by [facility] Diagnostic Provider at [facility] OR 2ND FLR    SKIN CANCER EXCISION         PAST SOCIAL HISTORY:   reports that he has never smoked. He has never used smokeless tobacco. He reports that he does not drink alcohol or use drugs.    FAMILY HISTORY:  Family History   Problem Relation Age of Onset    Heart disease Mother     Hypertension Mother     Heart failure Mother     Cataracts Mother     Cancer Father         lung    Diabetes Maternal Aunt     Stroke Sister     Rheum arthritis Paternal Grandmother     Amblyopia Neg Hx     Blindness Neg Hx     Glaucoma Neg Hx     Macular  degeneration Neg Hx     Retinal detachment Neg Hx     Strabismus Neg Hx        CURRENT MEDICATIONS:   Current Outpatient Medications   Medication Sig    alclomethasone (ACLOVATE) 0.05 % cream     alendronate (FOSAMAX) 70 MG tablet Take 1 tablet (70 mg total) by mouth every 7 days.    aspirin (ECOTRIN) 81 MG EC tablet Take 81 mg by mouth once daily.    atenolol (TENORMIN) 50 MG tablet Take 1 tablet (50 mg total) by mouth once daily.    atorvastatin (LIPITOR) 40 MG tablet Take 1 tablet (40 mg total) by mouth once daily.    blood sugar diagnostic (TRUE METRIX GLUCOSE TEST STRIP) Strp Test blood sugar four times a day    diltiaZEM (DILACOR XR) 240 MG CDCR Take 1 capsule (240 mg total) by mouth once daily.    flash glucose sensor (FREESTYLE ARELY 14 DAY SENSOR) Kit Glucose checking 4 times a day    FREESTYLE ARELY 14 DAY READER Misc GLUCOSE TESTING : FASTING AND PRIOR TO MEALS    insulin (BASAGLAR KWIKPEN U-100 INSULIN) glargine 100 units/mL (3mL) SubQ pen Inject 40 Units into the skin 2 (two) times daily. Up to 60 BID with cytoxan    insulin aspart U-100 (NOVOLOG) 100 unit/mL InPn pen Inject 20 Units into the skin 3 (three) times daily with meals. (Patient taking differently: Inject 20 Units into the skin 4 (four) times daily. )    insulin syringe-needle U-100 (INSULIN SYRINGE) 1/2 mL 30 gauge x 5/16 Syrg Use 3x/day    irbesartan (AVAPRO) 300 MG tablet Take 1 tablet (300 mg total) by mouth every evening.    ketoconazole (NIZORAL) 2 % cream     lancets 30 gauge Misc Test blood sugar four times a day    liraglutide 0.6 mg/0.1 mL, 18 mg/3 mL, subq PNIJ (VICTOZA 3-TOMASZ) 0.6 mg/0.1 mL (18 mg/3 mL) PnIj Inject 1.8 mg into the skin once daily.    metFORMIN (GLUCOPHAGE-XR) 500 MG 24 hr tablet Take 2 tablets (1,000 mg total) by mouth 2 (two) times daily with meals.    methylphenidate HCl (RITALIN) 5 MG tablet Take 1 tablet (5 mg total) by mouth once daily.    omega-3 fatty acids-vitamin E (FISH OIL) 1,000 mg  "Cap Take 1 capsule by mouth 2 (two) times daily.    pen needle, diabetic (BD ULTRA-FINE ANA PEN NEEDLE) 32 gauge x 5/32" Ndle 4x daily insulin pen needle, relion    QUEtiapine (SEROQUEL) 25 MG Tab Take 1 tablet (25 mg total) by mouth every evening.    sertraline (ZOLOFT) 100 MG tablet TAKE 1 & 1/2 (ONE & ONE-HALF) TABLETS BY MOUTH ONCE DAILY    VICTOZA 2-TOMASZ 0.6 mg/0.1 mL (18 mg/3 mL) PnIj INJECT 1.8 MG INTO THE SKIN ONCE DAILY    vitamin D 1000 units Tab Take 5 tablets (5,000 Units total) by mouth once daily.     No current facility-administered medications for this visit.      ALLERGIES:   Review of patient's allergies indicates:  No Known Allergies    REVIEW OF SYSTEMS:   General ROS: Negative  Psychological ROS: Confusion much improved.   Ophthalmic ROS: negative  ENT ROS: negative  Allergy and Immunology ROS: negative  Hematological and Lymphatic ROS: negative  Endocrine ROS: negative  Respiratory ROS: negative  Cardiovascular ROS: negative  Gastrointestinal ROS: negative  Genito-Urinary ROS: negative  Musculoskeletal ROS: negative  Neurological ROS: see HPI  Dermatological ROS: negative    PHYSICAL EXAM:   There were no vitals filed for this visit.    General - well developed, well nourished; much more interactive today. A+O to person, place, and situation, but not to time.  Head & Face - no sinus tenderness  Eyes - normal conjunctivae and lids   ENT - normal external auditory canals, no mouth sores  Chest and Lung - normal respiratory effort, clear to auscultation bilaterally   Cardiovascular - RRR with no MGR, normal S1 and S2; no pedal edema  Abdomen -  soft, nontender, no palpable hepatomegaly or splenomegaly  Lymph - no palpable lymphadenopathy  Extremities - unremarkable nails and digits  Heme - no bruising, petechiae, pallor  Skin - no rashes or lesions   Psych - Normal mood and affect. No confusion noted.    ECOG Performance Status: (foot note - ECOG PS provided by Eastern Cooperative Oncology " Group) 1 - Symptomatic but completely ambulatory    Karnofsky Performance Score:  80%- Normal Activity with Effort: Some Symptoms of Disease  DATA:   Lab Results   Component Value Date    WBC 6.26 06/21/2019    HGB 10.5 (L) 06/21/2019    HCT 31.2 (L) 06/21/2019    MCV 85 06/21/2019     (L) 06/21/2019     Gran # (ANC)   Date Value Ref Range Status   06/21/2019 4.8 1.8 - 7.7 K/uL Final     Gran%   Date Value Ref Range Status   06/21/2019 76.7 (H) 38.0 - 73.0 % Final     CMP  Sodium   Date Value Ref Range Status   06/21/2019 141 136 - 145 mmol/L Final     Potassium   Date Value Ref Range Status   06/21/2019 3.6 3.5 - 5.1 mmol/L Final     Chloride   Date Value Ref Range Status   06/21/2019 107 95 - 110 mmol/L Final     CO2   Date Value Ref Range Status   06/21/2019 23 23 - 29 mmol/L Final     Glucose   Date Value Ref Range Status   06/21/2019 209 (H) 70 - 110 mg/dL Final     BUN, Bld   Date Value Ref Range Status   06/21/2019 21 8 - 23 mg/dL Final     Creatinine   Date Value Ref Range Status   06/21/2019 1.5 (H) 0.5 - 1.4 mg/dL Final     Calcium   Date Value Ref Range Status   06/21/2019 9.6 8.7 - 10.5 mg/dL Final     Total Protein   Date Value Ref Range Status   06/21/2019 6.8 6.0 - 8.4 g/dL Final     Albumin   Date Value Ref Range Status   06/21/2019 3.7 3.5 - 5.2 g/dL Final   10/11/2013 4.3 3.6 - 5.1 g/dL Final     Comment:     @ Test Performed By:  [employer] VeraAMELIA Patterson M.D.,   [address] [place] [id number]  [place] #[id number]     Total Bilirubin   Date Value Ref Range Status   06/21/2019 0.7 0.1 - 1.0 mg/dL Final     Comment:     For infants and newborns, interpretation of results should be based  on gestational age, weight and in agreement with clinical  observations.  Premature Infant recommended reference ranges:  Up to 24 hours.............<8.0 mg/dL  Up to 48 hours............<12.0 mg/dL  3-5 days..................<15.0 mg/dL  6-29  days.................<15.0 mg/dL       Alkaline Phosphatase   Date Value Ref Range Status   06/21/2019 68 55 - 135 U/L Final     AST   Date Value Ref Range Status   06/21/2019 17 10 - 40 U/L Final     ALT   Date Value Ref Range Status   06/21/2019 15 10 - 44 U/L Final     Anion Gap   Date Value Ref Range Status   06/21/2019 11 8 - 16 mmol/L Final     eGFR if    Date Value Ref Range Status   06/21/2019 57.3 (A) >60 mL/min/1.73 m^2 Final     eGFR if non    Date Value Ref Range Status   06/21/2019 49.5 (A) >60 mL/min/1.73 m^2 Final     Comment:     Calculation used to obtain the estimated glomerular filtration  rate (eGFR) is the CKD-EPI equation.          ASSESSMENT AND PLAN:   Encounter Diagnoses   Name Primary?    CNS vasculitis failed imuran; on cytoxan Yes    Cytopenia     Diarrhea, unspecified type     Mixed hyperlipidemia     Essential hypertension 5/2016 TTE diastolic dysfunction     Controlled type 2 diabetes mellitus with diabetic nephropathy, with long-term current use of insulin     MANISH (acute kidney injury)        CNS vasculitis  -continue CTX per neuro recs  -proceed with dose #24 Cytoxan was decreased to 600 mg/m2 (due to CrCl) with mesna support for CNS vasculitis, protocol per Dr. Love; improved symptoms since initiation   -will continue per Neurology, failed Cytoxan hiatus and transition to Imuran Sept/Oct 2018. Restarted cytoxan 1/23/2018. Now much improved back on Cytoxan.  -last saw Neurology 4/22/19. Has follow-up with neurology tomorrow.  -No contraindication to proceed with CTX today    Cytopenias- Anemia/thrombocytopenia  -mild anemia and thromboctyopenia 2/2 chemotherapy, WBC wnl  -Transfuse for Hgb<7 and Plt <10k, no indication to transfuse today    Diarrhea  - imodium OTC PRN with relief    HTN/HLD/DM2  - f/u with PCP  - continue metformin, liraglutide, novolog, irbesartan, diazepam, lipitor, atenolol, ASA  - non-fasting blood glucose 168  today    MANISH  - in hospital with dehydration and MANISH in June  - secondary to poor oral intake  - reports good intake today  - creatinine is 1.1 today  - resolved     F/U:  - CBC and CMP in 1 week at [facility]) (lab collect).  - cbc, cmp, lab only in 2 weeks at [facility]) (lab collect).  - CBC, CMP, appt with Dr Goldstein or NP, & cytoxan infusion in 4 weeks (clinic collect labs from [facility]).    Future Appointments   Date Time Provider Department Center   7/18/2019  8:15 AM INJECTION, [facility] INFUSION [facility]   7/18/2019  9:30 AM Yulissa Klein, NP [facility] BM [facility]   7/18/2019 10:30 AM [facility], [facility]   7/23/2019  8:30 AM GABRIEL Somers, CNS [facility] MSC [facility]   7/23/2019  9:30 AM Sharda Bell, RDW-BACS [facility] MULTS [facility]   11/4/2019 10:30 AM Kay Troy MD [facility] RHEUM [facility]        Discussed with Dr. Angelita Klein, MARCINP  Hematology/Oncology/Bone Marrow Transplant

## 2019-07-18 ENCOUNTER — OFFICE VISIT (OUTPATIENT)
Dept: HEMATOLOGY/ONCOLOGY | Facility: CLINIC | Age: 59
End: 2019-07-18
Payer: MEDICARE

## 2019-07-18 ENCOUNTER — INFUSION (OUTPATIENT)
Dept: INFUSION THERAPY | Facility: HOSPITAL | Age: 59
End: 2019-07-18
Attending: INTERNAL MEDICINE
Payer: MEDICARE

## 2019-07-18 VITALS
TEMPERATURE: 99 F | RESPIRATION RATE: 18 BRPM | HEIGHT: 70 IN | BODY MASS INDEX: 31.78 KG/M2 | WEIGHT: 222 LBS | SYSTOLIC BLOOD PRESSURE: 136 MMHG | HEART RATE: 94 BPM | DIASTOLIC BLOOD PRESSURE: 80 MMHG

## 2019-07-18 VITALS
HEIGHT: 70 IN | WEIGHT: 222 LBS | OXYGEN SATURATION: 98 % | HEART RATE: 95 BPM | RESPIRATION RATE: 18 BRPM | BODY MASS INDEX: 31.78 KG/M2 | SYSTOLIC BLOOD PRESSURE: 136 MMHG | TEMPERATURE: 99 F | DIASTOLIC BLOOD PRESSURE: 71 MMHG

## 2019-07-18 DIAGNOSIS — T45.1X5A CHEMOTHERAPY-INDUCED THROMBOCYTOPENIA: ICD-10-CM

## 2019-07-18 DIAGNOSIS — R19.7 DIARRHEA, UNSPECIFIED TYPE: ICD-10-CM

## 2019-07-18 DIAGNOSIS — I77.6 CNS VASCULITIS: Primary | ICD-10-CM

## 2019-07-18 DIAGNOSIS — T45.1X5A ANEMIA ASSOCIATED WITH CHEMOTHERAPY: ICD-10-CM

## 2019-07-18 DIAGNOSIS — I77.6 CNS VASCULITIS: ICD-10-CM

## 2019-07-18 DIAGNOSIS — E78.2 MIXED HYPERLIPIDEMIA: ICD-10-CM

## 2019-07-18 DIAGNOSIS — I10 ESSENTIAL HYPERTENSION: ICD-10-CM

## 2019-07-18 DIAGNOSIS — D69.59 CHEMOTHERAPY-INDUCED THROMBOCYTOPENIA: ICD-10-CM

## 2019-07-18 DIAGNOSIS — D75.9 CYTOPENIA: ICD-10-CM

## 2019-07-18 DIAGNOSIS — D64.81 ANEMIA ASSOCIATED WITH CHEMOTHERAPY: ICD-10-CM

## 2019-07-18 DIAGNOSIS — E11.21 CONTROLLED TYPE 2 DIABETES MELLITUS WITH DIABETIC NEPHROPATHY, WITH LONG-TERM CURRENT USE OF INSULIN: ICD-10-CM

## 2019-07-18 DIAGNOSIS — Z79.4 CONTROLLED TYPE 2 DIABETES MELLITUS WITH DIABETIC NEPHROPATHY, WITH LONG-TERM CURRENT USE OF INSULIN: ICD-10-CM

## 2019-07-18 DIAGNOSIS — N17.9 AKI (ACUTE KIDNEY INJURY): ICD-10-CM

## 2019-07-18 LAB
ALBUMIN SERPL BCP-MCNC: 3.6 G/DL (ref 3.5–5.2)
ALP SERPL-CCNC: 77 U/L (ref 55–135)
ALT SERPL W/O P-5'-P-CCNC: 13 U/L (ref 10–44)
ANION GAP SERPL CALC-SCNC: 11 MMOL/L (ref 8–16)
AST SERPL-CCNC: 15 U/L (ref 10–40)
BASOPHILS # BLD AUTO: 0.03 K/UL (ref 0–0.2)
BASOPHILS NFR BLD: 0.4 % (ref 0–1.9)
BILIRUB SERPL-MCNC: 0.6 MG/DL (ref 0.1–1)
BUN SERPL-MCNC: 17 MG/DL (ref 8–23)
CALCIUM SERPL-MCNC: 9.5 MG/DL (ref 8.7–10.5)
CHLORIDE SERPL-SCNC: 107 MMOL/L (ref 95–110)
CO2 SERPL-SCNC: 24 MMOL/L (ref 23–29)
CREAT SERPL-MCNC: 1.1 MG/DL (ref 0.5–1.4)
DIFFERENTIAL METHOD: ABNORMAL
EOSINOPHIL # BLD AUTO: 0.2 K/UL (ref 0–0.5)
EOSINOPHIL NFR BLD: 2 % (ref 0–8)
ERYTHROCYTE [DISTWIDTH] IN BLOOD BY AUTOMATED COUNT: 14.9 % (ref 11.5–14.5)
EST. GFR  (AFRICAN AMERICAN): >60 ML/MIN/1.73 M^2
EST. GFR  (NON AFRICAN AMERICAN): >60 ML/MIN/1.73 M^2
GLUCOSE SERPL-MCNC: 163 MG/DL (ref 70–110)
HCT VFR BLD AUTO: 31.5 % (ref 40–54)
HGB BLD-MCNC: 10.3 G/DL (ref 14–18)
IMM GRANULOCYTES # BLD AUTO: 0.05 K/UL (ref 0–0.04)
IMM GRANULOCYTES NFR BLD AUTO: 0.6 % (ref 0–0.5)
LYMPHOCYTES # BLD AUTO: 0.8 K/UL (ref 1–4.8)
LYMPHOCYTES NFR BLD: 10.4 % (ref 18–48)
MCH RBC QN AUTO: 28.3 PG (ref 27–31)
MCHC RBC AUTO-ENTMCNC: 32.7 G/DL (ref 32–36)
MCV RBC AUTO: 87 FL (ref 82–98)
MONOCYTES # BLD AUTO: 0.7 K/UL (ref 0.3–1)
MONOCYTES NFR BLD: 9.1 % (ref 4–15)
NEUTROPHILS # BLD AUTO: 6.2 K/UL (ref 1.8–7.7)
NEUTROPHILS NFR BLD: 77.5 % (ref 38–73)
NRBC BLD-RTO: 0 /100 WBC
PLATELET # BLD AUTO: 78 K/UL (ref 150–350)
PMV BLD AUTO: 10.4 FL (ref 9.2–12.9)
POTASSIUM SERPL-SCNC: 3.8 MMOL/L (ref 3.5–5.1)
PROT SERPL-MCNC: 6.5 G/DL (ref 6–8.4)
RBC # BLD AUTO: 3.64 M/UL (ref 4.6–6.2)
SODIUM SERPL-SCNC: 142 MMOL/L (ref 136–145)
WBC # BLD AUTO: 7.99 K/UL (ref 3.9–12.7)

## 2019-07-18 PROCEDURE — 96374 THER/PROPH/DIAG INJ IV PUSH: CPT

## 2019-07-18 PROCEDURE — 3008F PR BODY MASS INDEX (BMI) DOCUMENTED: ICD-10-PCS | Mod: CPTII,S$GLB,, | Performed by: NURSE PRACTITIONER

## 2019-07-18 PROCEDURE — 96413 CHEMO IV INFUSION 1 HR: CPT

## 2019-07-18 PROCEDURE — 96367 TX/PROPH/DG ADDL SEQ IV INF: CPT

## 2019-07-18 PROCEDURE — 99999 PR PBB SHADOW E&M-EST. PATIENT-LVL III: CPT | Mod: PBBFAC,,, | Performed by: NURSE PRACTITIONER

## 2019-07-18 PROCEDURE — 99499 RISK ADDL DX/OHS AUDIT: ICD-10-PCS | Mod: S$GLB,,, | Performed by: NURSE PRACTITIONER

## 2019-07-18 PROCEDURE — 99213 PR OFFICE/OUTPT VISIT, EST, LEVL III, 20-29 MIN: ICD-10-PCS | Mod: S$GLB,,, | Performed by: NURSE PRACTITIONER

## 2019-07-18 PROCEDURE — 3078F DIAST BP <80 MM HG: CPT | Mod: CPTII,S$GLB,, | Performed by: NURSE PRACTITIONER

## 2019-07-18 PROCEDURE — 3075F SYST BP GE 130 - 139MM HG: CPT | Mod: CPTII,S$GLB,, | Performed by: NURSE PRACTITIONER

## 2019-07-18 PROCEDURE — 3078F PR MOST RECENT DIASTOLIC BLOOD PRESSURE < 80 MM HG: ICD-10-PCS | Mod: CPTII,S$GLB,, | Performed by: NURSE PRACTITIONER

## 2019-07-18 PROCEDURE — A4216 STERILE WATER/SALINE, 10 ML: HCPCS | Performed by: INTERNAL MEDICINE

## 2019-07-18 PROCEDURE — 3044F HG A1C LEVEL LT 7.0%: CPT | Mod: CPTII,S$GLB,, | Performed by: NURSE PRACTITIONER

## 2019-07-18 PROCEDURE — 96375 TX/PRO/DX INJ NEW DRUG ADDON: CPT

## 2019-07-18 PROCEDURE — 3075F PR MOST RECENT SYSTOLIC BLOOD PRESS GE 130-139MM HG: ICD-10-PCS | Mod: CPTII,S$GLB,, | Performed by: NURSE PRACTITIONER

## 2019-07-18 PROCEDURE — 25000003 PHARM REV CODE 250: Performed by: INTERNAL MEDICINE

## 2019-07-18 PROCEDURE — 3044F PR MOST RECENT HEMOGLOBIN A1C LEVEL <7.0%: ICD-10-PCS | Mod: CPTII,S$GLB,, | Performed by: NURSE PRACTITIONER

## 2019-07-18 PROCEDURE — 99213 OFFICE O/P EST LOW 20 MIN: CPT | Mod: S$GLB,,, | Performed by: NURSE PRACTITIONER

## 2019-07-18 PROCEDURE — 3008F BODY MASS INDEX DOCD: CPT | Mod: CPTII,S$GLB,, | Performed by: NURSE PRACTITIONER

## 2019-07-18 PROCEDURE — 85025 COMPLETE CBC W/AUTO DIFF WBC: CPT

## 2019-07-18 PROCEDURE — 99499 UNLISTED E&M SERVICE: CPT | Mod: S$GLB,,, | Performed by: NURSE PRACTITIONER

## 2019-07-18 PROCEDURE — 63600175 PHARM REV CODE 636 W HCPCS: Performed by: INTERNAL MEDICINE

## 2019-07-18 PROCEDURE — 99999 PR PBB SHADOW E&M-EST. PATIENT-LVL III: ICD-10-PCS | Mod: PBBFAC,,, | Performed by: NURSE PRACTITIONER

## 2019-07-18 PROCEDURE — 80053 COMPREHEN METABOLIC PANEL: CPT

## 2019-07-18 RX ORDER — SODIUM CHLORIDE 450 MG/100ML
INJECTION, SOLUTION INTRAVENOUS ONCE
Status: CANCELLED
Start: 2019-07-25

## 2019-07-18 RX ORDER — HEPARIN 100 UNIT/ML
500 SYRINGE INTRAVENOUS
Status: CANCELLED | OUTPATIENT
Start: 2019-07-25

## 2019-07-18 RX ORDER — SODIUM CHLORIDE 0.9 % (FLUSH) 0.9 %
10 SYRINGE (ML) INJECTION
Status: CANCELLED | OUTPATIENT
Start: 2019-07-25

## 2019-07-18 RX ORDER — HEPARIN 100 UNIT/ML
500 SYRINGE INTRAVENOUS
Status: COMPLETED | OUTPATIENT
Start: 2019-07-18 | End: 2019-07-18

## 2019-07-18 RX ORDER — HEPARIN 100 UNIT/ML
500 SYRINGE INTRAVENOUS
Status: CANCELLED | OUTPATIENT
Start: 2019-07-19

## 2019-07-18 RX ORDER — SODIUM CHLORIDE 0.9 % (FLUSH) 0.9 %
10 SYRINGE (ML) INJECTION
Status: DISCONTINUED | OUTPATIENT
Start: 2019-07-18 | End: 2019-07-18 | Stop reason: HOSPADM

## 2019-07-18 RX ORDER — SODIUM CHLORIDE 0.9 % (FLUSH) 0.9 %
10 SYRINGE (ML) INJECTION
Status: CANCELLED | OUTPATIENT
Start: 2019-07-19

## 2019-07-18 RX ORDER — ONDANSETRON HCL IN 0.9 % NACL 8 MG/50 ML
8 INTRAVENOUS SOLUTION, PIGGYBACK (ML) INTRAVENOUS
Status: CANCELLED | OUTPATIENT
Start: 2019-07-25

## 2019-07-18 RX ORDER — SODIUM CHLORIDE 0.9 % (FLUSH) 0.9 %
10 SYRINGE (ML) INJECTION
Status: COMPLETED | OUTPATIENT
Start: 2019-07-18 | End: 2019-07-18

## 2019-07-18 RX ORDER — PROCHLORPERAZINE MALEATE 10 MG
10 TABLET ORAL
Status: CANCELLED | OUTPATIENT
Start: 2019-07-25

## 2019-07-18 RX ORDER — HEPARIN 100 UNIT/ML
500 SYRINGE INTRAVENOUS
Status: DISCONTINUED | OUTPATIENT
Start: 2019-07-18 | End: 2019-07-18 | Stop reason: HOSPADM

## 2019-07-18 RX ADMIN — MESNA 1500 MG: 100 INJECTION, SOLUTION INTRAVENOUS at 12:07

## 2019-07-18 RX ADMIN — CYCLOPHOSPHAMIDE 1500 MG: 1 INJECTION, POWDER, FOR SOLUTION INTRAVENOUS; ORAL at 01:07

## 2019-07-18 RX ADMIN — HEPARIN 500 UNITS: 100 SYRINGE at 08:07

## 2019-07-18 RX ADMIN — Medication 10 ML: at 08:07

## 2019-07-18 RX ADMIN — Medication 10 ML: at 02:07

## 2019-07-18 RX ADMIN — HEPARIN 500 UNITS: 100 SYRINGE at 02:07

## 2019-07-18 RX ADMIN — DEXAMETHASONE SODIUM PHOSPHATE: 4 INJECTION, SOLUTION INTRAMUSCULAR; INTRAVENOUS at 12:07

## 2019-07-18 NOTE — PLAN OF CARE
Problem: Adult Inpatient Plan of Care  Goal: Plan of Care Review  Outcome: Ongoing (interventions implemented as appropriate)  Pt here for mesna/cytoxan infusion, labs, hx, meds, allergies reviewed, pt with no complaints at this time, reclined in chair, continue to monitor

## 2019-07-18 NOTE — Clinical Note
Mr. Nunez's appts, which should have been scheduled in 4 weeks, were scheduled on 8/27/19, which is 6 weeks away. Clinic appt can be with Dr. Goldstein or NP. Please reschedule for the week of August 12-16.

## 2019-07-18 NOTE — PLAN OF CARE
Problem: Adult Inpatient Plan of Care  Goal: Plan of Care Review  Outcome: Ongoing (interventions implemented as appropriate)  Pt tolerated mesna/cytoxan infusion without issue, pt to rtc 8/15/19, no distress noted upon d/c to home

## 2019-07-18 NOTE — Clinical Note
- CBC and CMP in 1 week at [facility]) (lab collect).- cbc, cmp, lab only in 2 weeks at [facility]) (lab collect).- CBC, CMP, appt with Dr Goldstein or NP, & cytoxan infusion in 4 weeks (clinic collect labs from [facility]).

## 2019-07-18 NOTE — NURSING
Pt here today for lab draw from port. Labs obtained easily, flushed, hep-locked and secured. Left in place for treatment today.

## 2019-07-19 DIAGNOSIS — E11.9 TYPE 2 DIABETES MELLITUS WITHOUT COMPLICATION: ICD-10-CM

## 2019-07-23 ENCOUNTER — OFFICE VISIT (OUTPATIENT)
Dept: PSYCHIATRY | Facility: CLINIC | Age: 59
End: 2019-07-23
Payer: COMMERCIAL

## 2019-07-23 ENCOUNTER — OFFICE VISIT (OUTPATIENT)
Dept: NEUROLOGY | Facility: CLINIC | Age: 59
End: 2019-07-23
Payer: MEDICARE

## 2019-07-23 VITALS
DIASTOLIC BLOOD PRESSURE: 66 MMHG | BODY MASS INDEX: 31.35 KG/M2 | SYSTOLIC BLOOD PRESSURE: 117 MMHG | WEIGHT: 219 LBS | HEIGHT: 70 IN | HEART RATE: 89 BPM

## 2019-07-23 DIAGNOSIS — G47.19 EXCESSIVE DAYTIME SLEEPINESS: ICD-10-CM

## 2019-07-23 DIAGNOSIS — I77.6 CNS VASCULITIS: ICD-10-CM

## 2019-07-23 DIAGNOSIS — Z29.89 PROPHYLACTIC IMMUNOTHERAPY: ICD-10-CM

## 2019-07-23 DIAGNOSIS — Z71.89 COUNSELING REGARDING GOALS OF CARE: Primary | ICD-10-CM

## 2019-07-23 DIAGNOSIS — Z63.9 FAMILY DISTRESS: ICD-10-CM

## 2019-07-23 DIAGNOSIS — Z79.899 HIGH RISK MEDICATION USE: ICD-10-CM

## 2019-07-23 DIAGNOSIS — F32.A DEPRESSIVE DISORDER: ICD-10-CM

## 2019-07-23 PROCEDURE — 3008F PR BODY MASS INDEX (BMI) DOCUMENTED: ICD-10-PCS | Mod: CPTII,S$GLB,, | Performed by: CLINICAL NURSE SPECIALIST

## 2019-07-23 PROCEDURE — 3078F PR MOST RECENT DIASTOLIC BLOOD PRESSURE < 80 MM HG: ICD-10-PCS | Mod: CPTII,S$GLB,, | Performed by: CLINICAL NURSE SPECIALIST

## 2019-07-23 PROCEDURE — 99214 PR OFFICE/OUTPT VISIT, EST, LEVL IV, 30-39 MIN: ICD-10-PCS | Mod: S$GLB,,, | Performed by: CLINICAL NURSE SPECIALIST

## 2019-07-23 PROCEDURE — 99214 OFFICE O/P EST MOD 30 MIN: CPT | Mod: S$GLB,,, | Performed by: CLINICAL NURSE SPECIALIST

## 2019-07-23 PROCEDURE — 90847 PR FAMILY PSYCHOTHERAPY W/ PT, 50 MIN: ICD-10-PCS | Mod: S$GLB,,, | Performed by: SOCIAL WORKER

## 2019-07-23 PROCEDURE — 3008F BODY MASS INDEX DOCD: CPT | Mod: CPTII,S$GLB,, | Performed by: CLINICAL NURSE SPECIALIST

## 2019-07-23 PROCEDURE — 90847 FAMILY PSYTX W/PT 50 MIN: CPT | Mod: S$GLB,,, | Performed by: SOCIAL WORKER

## 2019-07-23 PROCEDURE — 99999 PR PBB SHADOW E&M-EST. PATIENT-LVL III: CPT | Mod: PBBFAC,,, | Performed by: CLINICAL NURSE SPECIALIST

## 2019-07-23 PROCEDURE — 3078F DIAST BP <80 MM HG: CPT | Mod: CPTII,S$GLB,, | Performed by: CLINICAL NURSE SPECIALIST

## 2019-07-23 PROCEDURE — 99999 PR PBB SHADOW E&M-EST. PATIENT-LVL III: ICD-10-PCS | Mod: PBBFAC,,, | Performed by: CLINICAL NURSE SPECIALIST

## 2019-07-23 PROCEDURE — 3074F SYST BP LT 130 MM HG: CPT | Mod: CPTII,S$GLB,, | Performed by: CLINICAL NURSE SPECIALIST

## 2019-07-23 PROCEDURE — 3074F PR MOST RECENT SYSTOLIC BLOOD PRESSURE < 130 MM HG: ICD-10-PCS | Mod: CPTII,S$GLB,, | Performed by: CLINICAL NURSE SPECIALIST

## 2019-07-23 SDOH — SOCIAL DETERMINANTS OF HEALTH (SDOH): PROBLEM RELATED TO PRIMARY SUPPORT GROUP, UNSPECIFIED: Z63.9

## 2019-07-23 NOTE — PROGRESS NOTES
"Family Psychotherapy (PhD/LCSW)    7/23/2019    Site: [facility]    Length of service: 40    Therapeutic intervention: 90847-Family therapy with patient; needed because patient requires support from family to follow through on treatment recommendations 2/2 cognitive impairment    Persons present: Bessy and wife, Bekah    Interval history:  Bessy and his wife presented to session with casual dress and adequate hygiene.  They continue to visit with family frequently, which gives Bekah a break from caregiving and helps keep Bessy more awake and engaged in activities.  When at home, Bessy sleeps/is in bed much of the day.  This continues to be a source of tension as Bekah would like him more active and Bessy remains apathetic.  SW asked Bessy about his view on his wife's concern, and through this discussion he shared that he stays in bed a lot because "of what it could cause, with my condition".  SW asked clarifying questions, and it seems that Bessy may believe that activity will cause more strokes.  SW provided education on the importance of exercise, balancing rest and activity, keeping his brain engaged in hobbies and activities, etc.  Historically, Bessy expresses understanding and is agreeable in session to increasing his activity level but this does not translate to behavioral change once home - most likely due to significant apathy/reduced initiation (neurcognitive not depressive).  Family has not hired help or reconsidered assisted living, so it is hoped that they will continue to engage regularly with extended family for additional support.      SW reminded pt/wife that she will be moving and working remotely for [employer].  Provided education on virtual visits, and the couple is open to this format for visits.      Target symptoms: recurrent depression, confusion, adjustment, cognitive impairments     Patient's interpersonal/verbal exchanges: 90847-Family therapy with patient     Progress toward " goals: Wife increased support from extended family. Otherwise, no change.    

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
## 2019-07-23 NOTE — ASSESSMENT & PLAN NOTE
Stable; continue Cytoxan. He will see rheumatology in November for second opinion on Cytoxan management.

## 2019-08-01 ENCOUNTER — LAB VISIT (OUTPATIENT)
Dept: LAB | Facility: HOSPITAL | Age: 59
End: 2019-08-01
Attending: INTERNAL MEDICINE
Payer: MEDICARE

## 2019-08-01 ENCOUNTER — TELEPHONE (OUTPATIENT)
Dept: HEMATOLOGY/ONCOLOGY | Facility: CLINIC | Age: 59
End: 2019-08-01

## 2019-08-01 DIAGNOSIS — T45.1X5A CHEMOTHERAPY-INDUCED NEUTROPENIA: ICD-10-CM

## 2019-08-01 DIAGNOSIS — D70.1 CHEMOTHERAPY-INDUCED NEUTROPENIA: ICD-10-CM

## 2019-08-01 LAB
ABO + RH BLD: NORMAL
ALBUMIN SERPL BCP-MCNC: 3.8 G/DL (ref 3.5–5.2)
ALP SERPL-CCNC: 78 U/L (ref 55–135)
ALT SERPL W/O P-5'-P-CCNC: 17 U/L (ref 10–44)
ANION GAP SERPL CALC-SCNC: 11 MMOL/L (ref 8–16)
ANISOCYTOSIS BLD QL SMEAR: SLIGHT
AST SERPL-CCNC: 15 U/L (ref 10–40)
BASOPHILS # BLD AUTO: ABNORMAL K/UL (ref 0–0.2)
BASOPHILS NFR BLD: 0 % (ref 0–1.9)
BILIRUB SERPL-MCNC: 0.7 MG/DL (ref 0.1–1)
BLD GP AB SCN CELLS X3 SERPL QL: NORMAL
BUN SERPL-MCNC: 10 MG/DL (ref 8–23)
CALCIUM SERPL-MCNC: 9.3 MG/DL (ref 8.7–10.5)
CHLORIDE SERPL-SCNC: 107 MMOL/L (ref 95–110)
CO2 SERPL-SCNC: 23 MMOL/L (ref 23–29)
CREAT SERPL-MCNC: 1.1 MG/DL (ref 0.5–1.4)
DIFFERENTIAL METHOD: ABNORMAL
EOSINOPHIL # BLD AUTO: ABNORMAL K/UL (ref 0–0.5)
EOSINOPHIL NFR BLD: 10 % (ref 0–8)
ERYTHROCYTE [DISTWIDTH] IN BLOOD BY AUTOMATED COUNT: 14.9 % (ref 11.5–14.5)
EST. GFR  (AFRICAN AMERICAN): >60 ML/MIN/1.73 M^2
EST. GFR  (NON AFRICAN AMERICAN): >60 ML/MIN/1.73 M^2
GLUCOSE SERPL-MCNC: 168 MG/DL (ref 70–110)
HCT VFR BLD AUTO: 29.9 % (ref 40–54)
HGB BLD-MCNC: 10.3 G/DL (ref 14–18)
IMM GRANULOCYTES # BLD AUTO: ABNORMAL K/UL (ref 0–0.04)
IMM GRANULOCYTES NFR BLD AUTO: ABNORMAL % (ref 0–0.5)
LYMPHOCYTES # BLD AUTO: ABNORMAL K/UL (ref 1–4.8)
LYMPHOCYTES NFR BLD: 50 % (ref 18–48)
MCH RBC QN AUTO: 27.9 PG (ref 27–31)
MCHC RBC AUTO-ENTMCNC: 34.4 G/DL (ref 32–36)
MCV RBC AUTO: 81 FL (ref 82–98)
MONOCYTES # BLD AUTO: ABNORMAL K/UL (ref 0.3–1)
MONOCYTES NFR BLD: 18 % (ref 4–15)
NEUTROPHILS NFR BLD: 20 % (ref 38–73)
NEUTS BAND NFR BLD MANUAL: 2 %
NRBC BLD-RTO: 0 /100 WBC
OVALOCYTES BLD QL SMEAR: ABNORMAL
PLATELET # BLD AUTO: 125 K/UL (ref 150–350)
PMV BLD AUTO: 9.5 FL (ref 9.2–12.9)
POIKILOCYTOSIS BLD QL SMEAR: SLIGHT
POTASSIUM SERPL-SCNC: 3.7 MMOL/L (ref 3.5–5.1)
PROT SERPL-MCNC: 6.9 G/DL (ref 6–8.4)
RBC # BLD AUTO: 3.69 M/UL (ref 4.6–6.2)
SODIUM SERPL-SCNC: 141 MMOL/L (ref 136–145)
WBC # BLD AUTO: 1.74 K/UL (ref 3.9–12.7)

## 2019-08-01 PROCEDURE — 85007 BL SMEAR W/DIFF WBC COUNT: CPT

## 2019-08-01 PROCEDURE — 80053 COMPREHEN METABOLIC PANEL: CPT

## 2019-08-01 PROCEDURE — 36415 COLL VENOUS BLD VENIPUNCTURE: CPT

## 2019-08-01 PROCEDURE — 85027 COMPLETE CBC AUTOMATED: CPT

## 2019-08-01 PROCEDURE — 86901 BLOOD TYPING SEROLOGIC RH(D): CPT

## 2019-08-02 ENCOUNTER — PATIENT MESSAGE (OUTPATIENT)
Dept: NEUROLOGY | Facility: CLINIC | Age: 59
End: 2019-08-02

## 2019-08-15 ENCOUNTER — INFUSION (OUTPATIENT)
Dept: INFUSION THERAPY | Facility: HOSPITAL | Age: 59
End: 2019-08-15
Attending: INTERNAL MEDICINE
Payer: MEDICARE

## 2019-08-15 ENCOUNTER — OFFICE VISIT (OUTPATIENT)
Dept: HEMATOLOGY/ONCOLOGY | Facility: CLINIC | Age: 59
End: 2019-08-15
Payer: MEDICARE

## 2019-08-15 VITALS
WEIGHT: 220.44 LBS | BODY MASS INDEX: 36.73 KG/M2 | SYSTOLIC BLOOD PRESSURE: 140 MMHG | TEMPERATURE: 98 F | HEIGHT: 65 IN | OXYGEN SATURATION: 97 % | HEART RATE: 86 BPM | DIASTOLIC BLOOD PRESSURE: 63 MMHG

## 2019-08-15 VITALS — BODY MASS INDEX: 36.73 KG/M2 | HEIGHT: 65 IN | WEIGHT: 220.44 LBS

## 2019-08-15 DIAGNOSIS — I77.6 CNS VASCULITIS: ICD-10-CM

## 2019-08-15 DIAGNOSIS — R19.7 DIARRHEA, UNSPECIFIED TYPE: ICD-10-CM

## 2019-08-15 DIAGNOSIS — E78.2 MIXED HYPERLIPIDEMIA: ICD-10-CM

## 2019-08-15 DIAGNOSIS — T45.1X5A CHEMOTHERAPY-INDUCED NEUTROPENIA: Primary | ICD-10-CM

## 2019-08-15 DIAGNOSIS — T45.1X5A CHEMOTHERAPY-INDUCED THROMBOCYTOPENIA: ICD-10-CM

## 2019-08-15 DIAGNOSIS — D64.81 ANEMIA ASSOCIATED WITH CHEMOTHERAPY: ICD-10-CM

## 2019-08-15 DIAGNOSIS — I77.6 CNS VASCULITIS: Primary | ICD-10-CM

## 2019-08-15 DIAGNOSIS — E11.21 CONTROLLED TYPE 2 DIABETES MELLITUS WITH DIABETIC NEPHROPATHY, WITH LONG-TERM CURRENT USE OF INSULIN: ICD-10-CM

## 2019-08-15 DIAGNOSIS — I10 ESSENTIAL HYPERTENSION: ICD-10-CM

## 2019-08-15 DIAGNOSIS — N17.9 AKI (ACUTE KIDNEY INJURY): ICD-10-CM

## 2019-08-15 DIAGNOSIS — D69.59 CHEMOTHERAPY-INDUCED THROMBOCYTOPENIA: ICD-10-CM

## 2019-08-15 DIAGNOSIS — T45.1X5A ANEMIA ASSOCIATED WITH CHEMOTHERAPY: ICD-10-CM

## 2019-08-15 DIAGNOSIS — D70.1 CHEMOTHERAPY-INDUCED NEUTROPENIA: Primary | ICD-10-CM

## 2019-08-15 DIAGNOSIS — Z79.4 CONTROLLED TYPE 2 DIABETES MELLITUS WITH DIABETIC NEPHROPATHY, WITH LONG-TERM CURRENT USE OF INSULIN: ICD-10-CM

## 2019-08-15 LAB
ABO + RH BLD: NORMAL
ALBUMIN SERPL BCP-MCNC: 3.6 G/DL (ref 3.5–5.2)
ALP SERPL-CCNC: 86 U/L (ref 55–135)
ALT SERPL W/O P-5'-P-CCNC: 15 U/L (ref 10–44)
ANION GAP SERPL CALC-SCNC: 11 MMOL/L (ref 8–16)
AST SERPL-CCNC: 19 U/L (ref 10–40)
BASOPHILS # BLD AUTO: 0.04 K/UL (ref 0–0.2)
BASOPHILS NFR BLD: 0.6 % (ref 0–1.9)
BILIRUB SERPL-MCNC: 0.7 MG/DL (ref 0.1–1)
BLD GP AB SCN CELLS X3 SERPL QL: NORMAL
BUN SERPL-MCNC: 13 MG/DL (ref 8–23)
CALCIUM SERPL-MCNC: 9.3 MG/DL (ref 8.7–10.5)
CHLORIDE SERPL-SCNC: 105 MMOL/L (ref 95–110)
CO2 SERPL-SCNC: 24 MMOL/L (ref 23–29)
CREAT SERPL-MCNC: 1.5 MG/DL (ref 0.5–1.4)
DIFFERENTIAL METHOD: ABNORMAL
EOSINOPHIL # BLD AUTO: 0.1 K/UL (ref 0–0.5)
EOSINOPHIL NFR BLD: 2.1 % (ref 0–8)
ERYTHROCYTE [DISTWIDTH] IN BLOOD BY AUTOMATED COUNT: 15.4 % (ref 11.5–14.5)
EST. GFR  (AFRICAN AMERICAN): 57.3 ML/MIN/1.73 M^2
EST. GFR  (NON AFRICAN AMERICAN): 49.5 ML/MIN/1.73 M^2
GLUCOSE SERPL-MCNC: 289 MG/DL (ref 70–110)
HCT VFR BLD AUTO: 30.6 % (ref 40–54)
HGB BLD-MCNC: 10.5 G/DL (ref 14–18)
IMM GRANULOCYTES # BLD AUTO: 0.04 K/UL (ref 0–0.04)
IMM GRANULOCYTES NFR BLD AUTO: 0.6 % (ref 0–0.5)
LYMPHOCYTES # BLD AUTO: 1 K/UL (ref 1–4.8)
LYMPHOCYTES NFR BLD: 16.1 % (ref 18–48)
MCH RBC QN AUTO: 28.7 PG (ref 27–31)
MCHC RBC AUTO-ENTMCNC: 34.3 G/DL (ref 32–36)
MCV RBC AUTO: 84 FL (ref 82–98)
MONOCYTES # BLD AUTO: 0.4 K/UL (ref 0.3–1)
MONOCYTES NFR BLD: 6.8 % (ref 4–15)
NEUTROPHILS # BLD AUTO: 4.6 K/UL (ref 1.8–7.7)
NEUTROPHILS NFR BLD: 73.8 % (ref 38–73)
NRBC BLD-RTO: 0 /100 WBC
PLATELET # BLD AUTO: 90 K/UL (ref 150–350)
PMV BLD AUTO: 9.6 FL (ref 9.2–12.9)
POTASSIUM SERPL-SCNC: 3.5 MMOL/L (ref 3.5–5.1)
PROT SERPL-MCNC: 6.7 G/DL (ref 6–8.4)
RBC # BLD AUTO: 3.66 M/UL (ref 4.6–6.2)
SODIUM SERPL-SCNC: 140 MMOL/L (ref 136–145)
WBC # BLD AUTO: 6.2 K/UL (ref 3.9–12.7)

## 2019-08-15 PROCEDURE — 3078F PR MOST RECENT DIASTOLIC BLOOD PRESSURE < 80 MM HG: ICD-10-PCS | Mod: CPTII,S$GLB,, | Performed by: NURSE PRACTITIONER

## 2019-08-15 PROCEDURE — 99999 PR PBB SHADOW E&M-EST. PATIENT-LVL III: ICD-10-PCS | Mod: PBBFAC,,, | Performed by: NURSE PRACTITIONER

## 2019-08-15 PROCEDURE — 63600175 PHARM REV CODE 636 W HCPCS: Performed by: INTERNAL MEDICINE

## 2019-08-15 PROCEDURE — A4216 STERILE WATER/SALINE, 10 ML: HCPCS | Performed by: INTERNAL MEDICINE

## 2019-08-15 PROCEDURE — 99213 PR OFFICE/OUTPT VISIT, EST, LEVL III, 20-29 MIN: ICD-10-PCS | Mod: S$GLB,,, | Performed by: NURSE PRACTITIONER

## 2019-08-15 PROCEDURE — 63600175 PHARM REV CODE 636 W HCPCS: Performed by: NURSE PRACTITIONER

## 2019-08-15 PROCEDURE — 25000003 PHARM REV CODE 250: Performed by: INTERNAL MEDICINE

## 2019-08-15 PROCEDURE — 3008F PR BODY MASS INDEX (BMI) DOCUMENTED: ICD-10-PCS | Mod: CPTII,S$GLB,, | Performed by: NURSE PRACTITIONER

## 2019-08-15 PROCEDURE — 3008F BODY MASS INDEX DOCD: CPT | Mod: CPTII,S$GLB,, | Performed by: NURSE PRACTITIONER

## 2019-08-15 PROCEDURE — 96411 CHEMO IV PUSH ADDL DRUG: CPT

## 2019-08-15 PROCEDURE — 99999 PR PBB SHADOW E&M-EST. PATIENT-LVL III: CPT | Mod: PBBFAC,,, | Performed by: NURSE PRACTITIONER

## 2019-08-15 PROCEDURE — 99213 OFFICE O/P EST LOW 20 MIN: CPT | Mod: S$GLB,,, | Performed by: NURSE PRACTITIONER

## 2019-08-15 PROCEDURE — 86850 RBC ANTIBODY SCREEN: CPT

## 2019-08-15 PROCEDURE — 99499 UNLISTED E&M SERVICE: CPT | Mod: S$GLB,,, | Performed by: NURSE PRACTITIONER

## 2019-08-15 PROCEDURE — 96413 CHEMO IV INFUSION 1 HR: CPT

## 2019-08-15 PROCEDURE — 3044F HG A1C LEVEL LT 7.0%: CPT | Mod: CPTII,S$GLB,, | Performed by: NURSE PRACTITIONER

## 2019-08-15 PROCEDURE — 3044F PR MOST RECENT HEMOGLOBIN A1C LEVEL <7.0%: ICD-10-PCS | Mod: CPTII,S$GLB,, | Performed by: NURSE PRACTITIONER

## 2019-08-15 PROCEDURE — 85025 COMPLETE CBC W/AUTO DIFF WBC: CPT

## 2019-08-15 PROCEDURE — 3077F SYST BP >= 140 MM HG: CPT | Mod: CPTII,S$GLB,, | Performed by: NURSE PRACTITIONER

## 2019-08-15 PROCEDURE — 96367 TX/PROPH/DG ADDL SEQ IV INF: CPT

## 2019-08-15 PROCEDURE — 80053 COMPREHEN METABOLIC PANEL: CPT

## 2019-08-15 PROCEDURE — 3078F DIAST BP <80 MM HG: CPT | Mod: CPTII,S$GLB,, | Performed by: NURSE PRACTITIONER

## 2019-08-15 PROCEDURE — 3077F PR MOST RECENT SYSTOLIC BLOOD PRESSURE >= 140 MM HG: ICD-10-PCS | Mod: CPTII,S$GLB,, | Performed by: NURSE PRACTITIONER

## 2019-08-15 PROCEDURE — 99499 RISK ADDL DX/OHS AUDIT: ICD-10-PCS | Mod: S$GLB,,, | Performed by: NURSE PRACTITIONER

## 2019-08-15 RX ORDER — HEPARIN 100 UNIT/ML
500 SYRINGE INTRAVENOUS
Status: DISCONTINUED | OUTPATIENT
Start: 2019-08-15 | End: 2019-08-15 | Stop reason: HOSPADM

## 2019-08-15 RX ORDER — SODIUM CHLORIDE 0.9 % (FLUSH) 0.9 %
10 SYRINGE (ML) INJECTION
Status: DISCONTINUED | OUTPATIENT
Start: 2019-08-15 | End: 2019-08-15 | Stop reason: HOSPADM

## 2019-08-15 RX ORDER — HEPARIN 100 UNIT/ML
500 SYRINGE INTRAVENOUS
Status: CANCELLED | OUTPATIENT
Start: 2019-08-17

## 2019-08-15 RX ORDER — SODIUM CHLORIDE 0.9 % (FLUSH) 0.9 %
10 SYRINGE (ML) INJECTION
Status: CANCELLED | OUTPATIENT
Start: 2019-08-17

## 2019-08-15 RX ORDER — HEPARIN 100 UNIT/ML
500 SYRINGE INTRAVENOUS
Status: CANCELLED | OUTPATIENT
Start: 2019-08-15

## 2019-08-15 RX ORDER — SODIUM CHLORIDE 0.9 % (FLUSH) 0.9 %
10 SYRINGE (ML) INJECTION
Status: CANCELLED | OUTPATIENT
Start: 2019-08-15

## 2019-08-15 RX ORDER — HEPARIN 100 UNIT/ML
500 SYRINGE INTRAVENOUS
Status: COMPLETED | OUTPATIENT
Start: 2019-08-15 | End: 2019-08-15

## 2019-08-15 RX ORDER — SODIUM CHLORIDE 0.9 % (FLUSH) 0.9 %
10 SYRINGE (ML) INJECTION
Status: COMPLETED | OUTPATIENT
Start: 2019-08-15 | End: 2019-08-15

## 2019-08-15 RX ADMIN — HEPARIN 500 UNITS: 100 SYRINGE at 04:08

## 2019-08-15 RX ADMIN — SODIUM CHLORIDE 500 ML: 0.9 INJECTION, SOLUTION INTRAVENOUS at 03:08

## 2019-08-15 RX ADMIN — CYCLOPHOSPHAMIDE 1500 MG: 1 INJECTION, POWDER, FOR SOLUTION INTRAVENOUS; ORAL at 03:08

## 2019-08-15 RX ADMIN — Medication 10 ML: at 12:08

## 2019-08-15 RX ADMIN — HEPARIN 500 UNITS: 100 SYRINGE at 12:08

## 2019-08-15 RX ADMIN — MESNA 1500 MG: 100 INJECTION, SOLUTION INTRAVENOUS at 03:08

## 2019-08-15 RX ADMIN — Medication 10 ML: at 04:08

## 2019-08-15 RX ADMIN — DEXAMETHASONE SODIUM PHOSPHATE: 4 INJECTION, SOLUTION INTRA-ARTICULAR; INTRALESIONAL; INTRAMUSCULAR; INTRAVENOUS; SOFT TISSUE at 03:08

## 2019-08-15 NOTE — Clinical Note
-CBC and CMP in 1 week at [facility]) (lab collect)-cbc, cmp, lab only in 2 weeks at [facility]) (lab collect)-CBC, CMP, appt with Dr Goldstein & cytoxan infusion in 4 weeks (clinic collect labs from [facility]).

## 2019-08-15 NOTE — PROGRESS NOTES
SECTION OF HEMATOLOGY AND BONE MARROW TRANSPLANT    08/15/2019  Referred by:  Dr. Love  Referred for: Cytoxan    HISTORY OF PRESENT ILLNESS:   Mr. Nunez presents to clinic today with his wife for Cytoxan infusion for CNS Vasculitis. This will be dose #25. Cytoxan was stopped in 2018, and neurologic symptoms worsened. Since resuming Cytoxan, neurologic symptoms have improved; per son present at visit today they remain stable at this time. He was last seen by neurologist 7/23/19. He has tolerated Cytoxan with only minor complications. He has no complaints today. He denies fevers, chills, night sweats, n/v/c/d, SOB, chest pain, neuropathy, and visual disturbances. Has intermittent headaches and neck pain. He continues with intermittent confusion. He denies recent falls.       PAST MEDICAL HISTORY:   Past Medical History:   Diagnosis Date    Amblyopia     Cataract     CNS vasculitis 6/2/2017    Follows with Dr. Love By mri.  Several active lesions, many old. 5/13: MRA brain/neck, MRI brain w/wo contrast show no vascular occlusion, multiple foci of hyperintensity in deep cerebral periventricular white matter in pattern of demyelinating process.  5/17: MRI spine performed, no spinal cord lesions. Hospitalization 6/2017. EEG was performed negative for seizures.  Repeat MRI showing new lesion, question whether this was demyelination versus CVA. 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 Findings compatible with a small acute lacunar infarct adjacent to the left frontal horn.  Nonspecific enhancement just inferior to this region and extending into the left basal ganglia, as well as within the inferior aspect of the left cerebellum.  No evidence of a focal mass. 2.  Extensive increased signal intensity  involving the periventricular white matter compatible with demyelinating disease versus vasculitis. 3.  Clinical correlation and followup recommended. 4.  This reportedly flattened in the EPIC and the corpus callosum medical record system.    Mixed hyperlipidemia 11/9/2012    NAFLD (nonalcoholic fatty liver disease) 10/11/2013    CHASE (nonalcoholic steatohepatitis)     Obesity     Stroke     Type 2 diabetes mellitus with diabetic polyneuropathy, with long-term current use of insulin 5/14/2017    Type 2 diabetes mellitus with hyperglycemia, with long-term current use of insulin 10/11/2013       PAST SURGICAL HISTORY:   Past Surgical History:   Procedure Laterality Date    BIOPSY LIVER AND ULTRASOUND N/A 6/9/2015    Performed by [facility] Provider at [facility] OR 2ND FLR    COLONOSCOPY N/A 5/21/2019    Performed by [facility] at [facility] ENDO    COLONOSCOPY N/A 2/21/2014    Performed by [facility]ves [facility] at [facility] ENDO    ESOPHAGOGASTRODUODENOSCOPY (EGD) N/A 5/29/2017    Performed by Hai Carter MD at [facility] ENDO (2ND FLR)    [id number]-CATH N/A 12/7/2018    Performed by [facility] Provider at [facility] OR 2ND FLR    SKIN CANCER EXCISION         PAST SOCIAL HISTORY:   reports that he has never smoked. He has never used smokeless tobacco. He reports that he does not drink alcohol or use drugs.    FAMILY HISTORY:  Family History   Problem Relation Age of Onset    Heart disease Mother     Hypertension Mother     Heart failure Mother     Cataracts Mother     Cancer Father         lung    Diabetes Maternal Aunt     Stroke Sister     Rheum arthritis Paternal Grandmother     Amblyopia Neg Hx     Blindness Neg Hx     Glaucoma Neg Hx     Macular degeneration Neg Hx     Retinal detachment Neg Hx     Strabismus Neg Hx        CURRENT MEDICATIONS:   Current Outpatient Medications   Medication Sig    alclomethasone (ACLOVATE) 0.05 % cream     alendronate (FOSAMAX) 70 MG tablet Take 1 tablet  "(70 mg total) by mouth every 7 days.    aspirin (ECOTRIN) 81 MG EC tablet Take 81 mg by mouth once daily.    atenolol (TENORMIN) 50 MG tablet Take 1 tablet (50 mg total) by mouth once daily.    atorvastatin (LIPITOR) 40 MG tablet Take 1 tablet (40 mg total) by mouth once daily.    blood sugar diagnostic (TRUE METRIX GLUCOSE TEST STRIP) Strp Test blood sugar four times a day    diltiaZEM (DILACOR XR) 240 MG CDCR Take 1 capsule (240 mg total) by mouth once daily.    flash glucose sensor (FREESTYLE ARELY 14 DAY SENSOR) Kit Glucose checking 4 times a day    FREESTYLE ARELY 14 DAY READER Misc GLUCOSE TESTING : FASTING AND PRIOR TO MEALS    insulin (BASAGLAR KWIKPEN U-100 INSULIN) glargine 100 units/mL (3mL) SubQ pen Inject 40 Units into the skin 2 (two) times daily. Up to 60 BID with cytoxan    insulin aspart U-100 (NOVOLOG) 100 unit/mL InPn pen Inject 20 Units into the skin 3 (three) times daily with meals. (Patient taking differently: Inject 20 Units into the skin 4 (four) times daily. )    insulin syringe-needle U-100 (INSULIN SYRINGE) 1/2 mL 30 gauge x 5/16 Syrg Use 3x/day    ketoconazole (NIZORAL) 2 % cream     lancets 30 gauge Misc Test blood sugar four times a day    liraglutide 0.6 mg/0.1 mL, 18 mg/3 mL, subq PNIJ (VICTOZA 3-TOMASZ) 0.6 mg/0.1 mL (18 mg/3 mL) PnIj Inject 1.8 mg into the skin once daily.    metFORMIN (GLUCOPHAGE-XR) 500 MG 24 hr tablet Take 2 tablets (1,000 mg total) by mouth 2 (two) times daily with meals.    methylphenidate HCl (RITALIN) 5 MG tablet Take 1 tablet (5 mg total) by mouth once daily.    omega-3 fatty acids-vitamin E (FISH OIL) 1,000 mg Cap Take 1 capsule by mouth 2 (two) times daily.    pen needle, diabetic (BD ULTRA-FINE ANA PEN NEEDLE) 32 gauge x 5/32" Ndle 4x daily insulin pen needle, relion    QUEtiapine (SEROQUEL) 25 MG Tab Take 1 tablet (25 mg total) by mouth every evening.    sertraline (ZOLOFT) 100 MG tablet TAKE 1 & 1/2 (ONE & ONE-HALF) TABLETS BY MOUTH " ONCE DAILY    VICTOZA 2-TOMASZ 0.6 mg/0.1 mL (18 mg/3 mL) PnIj INJECT 1.8 MG INTO THE SKIN ONCE DAILY    vitamin D 1000 units Tab Take 5 tablets (5,000 Units total) by mouth once daily.    irbesartan (AVAPRO) 300 MG tablet Take 1 tablet (300 mg total) by mouth every evening.     No current facility-administered medications for this visit.      Facility-Administered Medications Ordered in Other Visits   Medication    alteplase injection 2 mg    cyclophosphamide (CYTOXAN) 1,500 mg in sodium chloride 0.9% 250 mL chemo infusion    heparin, porcine (PF) 100 unit/mL injection flush 500 Units    mesna (MESNEX) 1,500 mg in sodium chloride 0.9% 35 mL infusion    palonosetron 0.25mg/dexamethasone 20mg IVPB    sodium chloride 0.9% bolus 500 mL    sodium chloride 0.9% flush 10 mL     ALLERGIES:   Review of patient's allergies indicates:  No Known Allergies    REVIEW OF SYSTEMS:   General ROS: Negative  Psychological ROS: Confusion much improved.   Ophthalmic ROS: negative  ENT ROS: negative  Allergy and Immunology ROS: negative  Hematological and Lymphatic ROS: negative  Endocrine ROS: negative  Respiratory ROS: negative  Cardiovascular ROS: negative  Gastrointestinal ROS: negative  Genito-Urinary ROS: negative  Musculoskeletal ROS: negative  Neurological ROS: see HPI  Dermatological ROS: negative    PHYSICAL EXAM:   Vitals:    08/15/19 1437   BP: (!) 140/63   Pulse: 86   Temp: 98.4 °F (36.9 °C)       General - well developed, well nourished; much more interactive today. A+Ox3 today.  Head & Face - no sinus tenderness  Eyes - normal conjunctivae and lids   ENT - normal external auditory canals, no mouth sores  Chest and Lung - normal respiratory effort, clear to auscultation bilaterally   Cardiovascular - RRR with no MGR, normal S1 and S2; no pedal edema  Abdomen -  soft, nontender, no palpable hepatomegaly or splenomegaly  Lymph - no palpable lymphadenopathy  Extremities - unremarkable nails and digits  Heme - no  bruising, petechiae, pallor  Skin - no rashes or lesions   Psych - Normal mood and affect. Intermittent confusion noted (forgot what he had for lunch).    ECOG Performance Status: (foot note - ECOG PS provided by Eastern Cooperative Oncology Group) 1 - Symptomatic but completely ambulatory    Karnofsky Performance Score:  80%- Normal Activity with Effort: Some Symptoms of Disease  DATA:   Lab Results   Component Value Date    WBC 6.20 08/15/2019    HGB 10.5 (L) 08/15/2019    HCT 30.6 (L) 08/15/2019    MCV 84 08/15/2019    PLT 90 (L) 08/15/2019     Gran # (ANC)   Date Value Ref Range Status   08/15/2019 4.6 1.8 - 7.7 K/uL Final     Gran%   Date Value Ref Range Status   08/15/2019 73.8 (H) 38.0 - 73.0 % Final     CMP  Sodium   Date Value Ref Range Status   08/15/2019 140 136 - 145 mmol/L Final     Potassium   Date Value Ref Range Status   08/15/2019 3.5 3.5 - 5.1 mmol/L Final     Chloride   Date Value Ref Range Status   08/15/2019 105 95 - 110 mmol/L Final     CO2   Date Value Ref Range Status   08/15/2019 24 23 - 29 mmol/L Final     Glucose   Date Value Ref Range Status   08/15/2019 289 (H) 70 - 110 mg/dL Final     BUN, Bld   Date Value Ref Range Status   08/15/2019 13 8 - 23 mg/dL Final     Creatinine   Date Value Ref Range Status   08/15/2019 1.5 (H) 0.5 - 1.4 mg/dL Final     Calcium   Date Value Ref Range Status   08/15/2019 9.3 8.7 - 10.5 mg/dL Final     Total Protein   Date Value Ref Range Status   08/15/2019 6.7 6.0 - 8.4 g/dL Final     Albumin   Date Value Ref Range Status   08/15/2019 3.6 3.5 - 5.2 g/dL Final   10/11/2013 4.3 3.6 - 5.1 g/dL Final     Comment:     @ Test Performed By:  [employer] [facility] M.D., FCAP.,   [address] [place] [id number]  IA #[id number]     Total Bilirubin   Date Value Ref Range Status   08/15/2019 0.7 0.1 - 1.0 mg/dL Final     Comment:     For infants and newborns, interpretation of results should be based  on  gestational age, weight and in agreement with clinical  observations.  Premature Infant recommended reference ranges:  Up to 24 hours.............<8.0 mg/dL  Up to 48 hours............<12.0 mg/dL  3-5 days..................<15.0 mg/dL  6-29 days.................<15.0 mg/dL       Alkaline Phosphatase   Date Value Ref Range Status   08/15/2019 86 55 - 135 U/L Final     AST   Date Value Ref Range Status   08/15/2019 19 10 - 40 U/L Final     ALT   Date Value Ref Range Status   08/15/2019 15 10 - 44 U/L Final     Anion Gap   Date Value Ref Range Status   08/15/2019 11 8 - 16 mmol/L Final     eGFR if    Date Value Ref Range Status   08/15/2019 57.3 (A) >60 mL/min/1.73 m^2 Final     eGFR if non    Date Value Ref Range Status   08/15/2019 49.5 (A) >60 mL/min/1.73 m^2 Final     Comment:     Calculation used to obtain the estimated glomerular filtration  rate (eGFR) is the CKD-EPI equation.          ASSESSMENT AND PLAN:   Encounter Diagnoses   Name Primary?    CNS vasculitis failed imuran; on cytoxan Yes    Anemia associated with chemotherapy     Chemotherapy-induced thrombocytopenia     Controlled type 2 diabetes mellitus with diabetic nephropathy, with long-term current use of insulin     Essential hypertension 5/2016 TTE diastolic dysfunction     Mixed hyperlipidemia     Diarrhea, unspecified type     MANISH (acute kidney injury)        CNS vasculitis  -continue CTX per neuro recs  -proceed with dose #25 Cytoxan was decreased to 600 mg/m2 (due to CrCl) with mesna support for CNS vasculitis, protocol per Dr. Love; improved symptoms since initiation; per son at visit today symptoms have reached a plateau; pt with fatigue and labile affect most days    -will continue per Neurology, failed Cytoxan hiatus and transition to Imuran Sept/Oct 2018. Restarted cytoxan 1/23/2018. Now much improved back on Cytoxan.  -last saw Neurology 7/23/19  -No contraindication to proceed with CTX today (okay  per Dr. Goldstein to give chemo with plt count of 90K today)    Cytopenias- Anemia/thrombocytopenia  -mild anemia and thromboctyopenia 2/2 chemotherapy, WBC wnl  -Transfuse for Hgb<7 and Plt <10k, no indication to transfuse today    Diarrhea  -imodium OTC PRN with relief    HTN/HLD/DM2  -f/u with PCP  -continue metformin, liraglutide, novolog, irbesartan, diazepam, lipitor, atenolol, ASA  -non-fasting blood glucose 289 today    MANISH  -secondary to poor oral intake  -encouraged increase PO fluids  -creatinine is 1.5 today; will give 500cc NS bolus with chemo today     F/U:  -CBC and CMP in 1 week at [facility]) (lab collect)  -cbc, cmp, lab only in 2 weeks at [facility]) (lab collect)  -CBC, CMP, appt with Dr Goldstein & cytoxan infusion in 4 weeks (clinic collect labs from [facility])      Nancy Kaba NP  Hematology/Oncology/BMT

## 2019-08-15 NOTE — PLAN OF CARE
Problem: Adult Inpatient Plan of Care  Goal: Plan of Care Review  Outcome: Ongoing (interventions implemented as appropriate)  Pt tolerated cytoxan/mesna without complications. VSS. No s/s of reaction. Instructed to contact MD with any questions. PAC heparin locked and de-accessed. AVS given to patient.

## 2019-08-18 DIAGNOSIS — D69.6 THROMBOCYTOPENIA: Primary | ICD-10-CM

## 2019-09-09 ENCOUNTER — PATIENT MESSAGE (OUTPATIENT)
Dept: NEUROLOGY | Facility: CLINIC | Age: 59
End: 2019-09-09

## 2019-09-09 DIAGNOSIS — R53.83 FATIGUE, UNSPECIFIED TYPE: Primary | ICD-10-CM

## 2019-09-09 RX ORDER — METHYLPHENIDATE HYDROCHLORIDE 10 MG/1
10 TABLET ORAL 2 TIMES DAILY WITH MEALS
Qty: 60 TABLET | Refills: 0 | Status: SHIPPED | OUTPATIENT
Start: 2019-09-09 | End: 2019-10-10 | Stop reason: SDUPTHER

## 2019-09-16 ENCOUNTER — TELEPHONE (OUTPATIENT)
Dept: HEMATOLOGY/ONCOLOGY | Facility: CLINIC | Age: 59
End: 2019-09-16

## 2019-09-17 ENCOUNTER — INFUSION (OUTPATIENT)
Dept: INFUSION THERAPY | Facility: HOSPITAL | Age: 59
End: 2019-09-17
Attending: INTERNAL MEDICINE
Payer: MEDICARE

## 2019-09-17 ENCOUNTER — OFFICE VISIT (OUTPATIENT)
Dept: HEMATOLOGY/ONCOLOGY | Facility: CLINIC | Age: 59
End: 2019-09-17
Payer: MEDICARE

## 2019-09-17 VITALS
RESPIRATION RATE: 16 BRPM | TEMPERATURE: 98 F | HEART RATE: 66 BPM | OXYGEN SATURATION: 100 % | HEIGHT: 71 IN | BODY MASS INDEX: 30.34 KG/M2 | SYSTOLIC BLOOD PRESSURE: 119 MMHG | DIASTOLIC BLOOD PRESSURE: 74 MMHG | WEIGHT: 216.69 LBS

## 2019-09-17 VITALS
BODY MASS INDEX: 30.34 KG/M2 | WEIGHT: 216.69 LBS | HEART RATE: 66 BPM | SYSTOLIC BLOOD PRESSURE: 107 MMHG | TEMPERATURE: 98 F | HEIGHT: 71 IN | RESPIRATION RATE: 16 BRPM | DIASTOLIC BLOOD PRESSURE: 64 MMHG

## 2019-09-17 DIAGNOSIS — I77.6 CNS VASCULITIS: Primary | ICD-10-CM

## 2019-09-17 DIAGNOSIS — D64.81 ANEMIA ASSOCIATED WITH CHEMOTHERAPY: ICD-10-CM

## 2019-09-17 DIAGNOSIS — T45.1X5A CHEMOTHERAPY-INDUCED THROMBOCYTOPENIA: ICD-10-CM

## 2019-09-17 DIAGNOSIS — I77.6 CNS VASCULITIS: ICD-10-CM

## 2019-09-17 DIAGNOSIS — D69.6 THROMBOCYTOPENIA: ICD-10-CM

## 2019-09-17 DIAGNOSIS — T45.1X5A ANEMIA ASSOCIATED WITH CHEMOTHERAPY: ICD-10-CM

## 2019-09-17 DIAGNOSIS — D69.59 CHEMOTHERAPY-INDUCED THROMBOCYTOPENIA: ICD-10-CM

## 2019-09-17 LAB
ALBUMIN SERPL BCP-MCNC: 3.9 G/DL (ref 3.5–5.2)
ALP SERPL-CCNC: 76 U/L (ref 55–135)
ALT SERPL W/O P-5'-P-CCNC: 14 U/L (ref 10–44)
ANION GAP SERPL CALC-SCNC: 11 MMOL/L (ref 8–16)
AST SERPL-CCNC: 17 U/L (ref 10–40)
BASOPHILS # BLD AUTO: 0.03 K/UL (ref 0–0.2)
BASOPHILS NFR BLD: 0.5 % (ref 0–1.9)
BILIRUB SERPL-MCNC: 0.5 MG/DL (ref 0.1–1)
BUN SERPL-MCNC: 18 MG/DL (ref 8–23)
CALCIUM SERPL-MCNC: 9.1 MG/DL (ref 8.7–10.5)
CHLORIDE SERPL-SCNC: 105 MMOL/L (ref 95–110)
CO2 SERPL-SCNC: 24 MMOL/L (ref 23–29)
CREAT SERPL-MCNC: 1.3 MG/DL (ref 0.5–1.4)
DIFFERENTIAL METHOD: ABNORMAL
EOSINOPHIL # BLD AUTO: 0.1 K/UL (ref 0–0.5)
EOSINOPHIL NFR BLD: 1.6 % (ref 0–8)
ERYTHROCYTE [DISTWIDTH] IN BLOOD BY AUTOMATED COUNT: 15 % (ref 11.5–14.5)
EST. GFR  (AFRICAN AMERICAN): >60 ML/MIN/1.73 M^2
EST. GFR  (NON AFRICAN AMERICAN): 58.9 ML/MIN/1.73 M^2
GLUCOSE SERPL-MCNC: 200 MG/DL (ref 70–110)
HCT VFR BLD AUTO: 32.3 % (ref 40–54)
HGB BLD-MCNC: 11.3 G/DL (ref 14–18)
IMM GRANULOCYTES # BLD AUTO: 0.03 K/UL (ref 0–0.04)
IMM GRANULOCYTES NFR BLD AUTO: 0.5 % (ref 0–0.5)
LYMPHOCYTES # BLD AUTO: 1 K/UL (ref 1–4.8)
LYMPHOCYTES NFR BLD: 16.4 % (ref 18–48)
MCH RBC QN AUTO: 29.2 PG (ref 27–31)
MCHC RBC AUTO-ENTMCNC: 35 G/DL (ref 32–36)
MCV RBC AUTO: 84 FL (ref 82–98)
MONOCYTES # BLD AUTO: 0.6 K/UL (ref 0.3–1)
MONOCYTES NFR BLD: 9.3 % (ref 4–15)
NEUTROPHILS # BLD AUTO: 4.4 K/UL (ref 1.8–7.7)
NEUTROPHILS NFR BLD: 71.7 % (ref 38–73)
NRBC BLD-RTO: 0 /100 WBC
PLATELET # BLD AUTO: 148 K/UL (ref 150–350)
PMV BLD AUTO: 9.5 FL (ref 9.2–12.9)
POTASSIUM SERPL-SCNC: 3.9 MMOL/L (ref 3.5–5.1)
PROT SERPL-MCNC: 7 G/DL (ref 6–8.4)
RBC # BLD AUTO: 3.87 M/UL (ref 4.6–6.2)
SODIUM SERPL-SCNC: 140 MMOL/L (ref 136–145)
WBC # BLD AUTO: 6.15 K/UL (ref 3.9–12.7)

## 2019-09-17 PROCEDURE — 25000003 PHARM REV CODE 250: Performed by: INTERNAL MEDICINE

## 2019-09-17 PROCEDURE — 3078F DIAST BP <80 MM HG: CPT | Mod: CPTII,S$GLB,, | Performed by: INTERNAL MEDICINE

## 2019-09-17 PROCEDURE — 80053 COMPREHEN METABOLIC PANEL: CPT

## 2019-09-17 PROCEDURE — A4216 STERILE WATER/SALINE, 10 ML: HCPCS | Performed by: INTERNAL MEDICINE

## 2019-09-17 PROCEDURE — 96367 TX/PROPH/DG ADDL SEQ IV INF: CPT

## 2019-09-17 PROCEDURE — 3074F PR MOST RECENT SYSTOLIC BLOOD PRESSURE < 130 MM HG: ICD-10-PCS | Mod: CPTII,S$GLB,, | Performed by: INTERNAL MEDICINE

## 2019-09-17 PROCEDURE — 63600175 PHARM REV CODE 636 W HCPCS: Performed by: INTERNAL MEDICINE

## 2019-09-17 PROCEDURE — 85025 COMPLETE CBC W/AUTO DIFF WBC: CPT

## 2019-09-17 PROCEDURE — 99499 RISK ADDL DX/OHS AUDIT: ICD-10-PCS | Mod: S$GLB,,, | Performed by: INTERNAL MEDICINE

## 2019-09-17 PROCEDURE — 3008F PR BODY MASS INDEX (BMI) DOCUMENTED: ICD-10-PCS | Mod: CPTII,S$GLB,, | Performed by: INTERNAL MEDICINE

## 2019-09-17 PROCEDURE — 99999 PR PBB SHADOW E&M-EST. PATIENT-LVL IV: ICD-10-PCS | Mod: PBBFAC,,, | Performed by: INTERNAL MEDICINE

## 2019-09-17 PROCEDURE — 99499 UNLISTED E&M SERVICE: CPT | Mod: S$GLB,,, | Performed by: INTERNAL MEDICINE

## 2019-09-17 PROCEDURE — 3074F SYST BP LT 130 MM HG: CPT | Mod: CPTII,S$GLB,, | Performed by: INTERNAL MEDICINE

## 2019-09-17 PROCEDURE — 96413 CHEMO IV INFUSION 1 HR: CPT

## 2019-09-17 PROCEDURE — 99999 PR PBB SHADOW E&M-EST. PATIENT-LVL IV: CPT | Mod: PBBFAC,,, | Performed by: INTERNAL MEDICINE

## 2019-09-17 PROCEDURE — 96411 CHEMO IV PUSH ADDL DRUG: CPT

## 2019-09-17 PROCEDURE — 3078F PR MOST RECENT DIASTOLIC BLOOD PRESSURE < 80 MM HG: ICD-10-PCS | Mod: CPTII,S$GLB,, | Performed by: INTERNAL MEDICINE

## 2019-09-17 PROCEDURE — 3008F BODY MASS INDEX DOCD: CPT | Mod: CPTII,S$GLB,, | Performed by: INTERNAL MEDICINE

## 2019-09-17 PROCEDURE — 99214 PR OFFICE/OUTPT VISIT, EST, LEVL IV, 30-39 MIN: ICD-10-PCS | Mod: S$GLB,,, | Performed by: INTERNAL MEDICINE

## 2019-09-17 PROCEDURE — 99214 OFFICE O/P EST MOD 30 MIN: CPT | Mod: S$GLB,,, | Performed by: INTERNAL MEDICINE

## 2019-09-17 RX ORDER — SODIUM CHLORIDE 0.9 % (FLUSH) 0.9 %
10 SYRINGE (ML) INJECTION
Status: DISCONTINUED | OUTPATIENT
Start: 2019-09-17 | End: 2019-09-17 | Stop reason: HOSPADM

## 2019-09-17 RX ORDER — HEPARIN 100 UNIT/ML
500 SYRINGE INTRAVENOUS
Status: CANCELLED | OUTPATIENT
Start: 2019-09-17

## 2019-09-17 RX ORDER — SODIUM CHLORIDE 0.9 % (FLUSH) 0.9 %
10 SYRINGE (ML) INJECTION
Status: COMPLETED | OUTPATIENT
Start: 2019-09-17 | End: 2019-09-17

## 2019-09-17 RX ORDER — SODIUM CHLORIDE 0.9 % (FLUSH) 0.9 %
10 SYRINGE (ML) INJECTION
Status: CANCELLED | OUTPATIENT
Start: 2019-09-17

## 2019-09-17 RX ORDER — HEPARIN 100 UNIT/ML
500 SYRINGE INTRAVENOUS
Status: COMPLETED | OUTPATIENT
Start: 2019-09-17 | End: 2019-09-17

## 2019-09-17 RX ORDER — HEPARIN 100 UNIT/ML
500 SYRINGE INTRAVENOUS
Status: DISCONTINUED | OUTPATIENT
Start: 2019-09-17 | End: 2019-09-17 | Stop reason: HOSPADM

## 2019-09-17 RX ADMIN — CYCLOPHOSPHAMIDE 1500 MG: 1 INJECTION, POWDER, FOR SOLUTION INTRAVENOUS; ORAL at 11:09

## 2019-09-17 RX ADMIN — MESNA 1500 MG: 100 INJECTION, SOLUTION INTRAVENOUS at 11:09

## 2019-09-17 RX ADMIN — SODIUM CHLORIDE, PRESERVATIVE FREE 10 ML: 5 INJECTION INTRAVENOUS at 09:09

## 2019-09-17 RX ADMIN — DEXAMETHASONE SODIUM PHOSPHATE: 4 INJECTION, SOLUTION INTRAMUSCULAR; INTRAVENOUS at 11:09

## 2019-09-17 RX ADMIN — Medication 10 ML: at 12:09

## 2019-09-17 RX ADMIN — HEPARIN SODIUM (PORCINE) LOCK FLUSH IV SOLN 100 UNIT/ML 500 UNITS: 100 SOLUTION at 09:09

## 2019-09-17 RX ADMIN — HEPARIN SODIUM (PORCINE) LOCK FLUSH IV SOLN 100 UNIT/ML 500 UNITS: 100 SOLUTION at 12:09

## 2019-09-17 NOTE — PROGRESS NOTES
SECTION OF HEMATOLOGY AND BONE MARROW TRANSPLANT    09/20/2019  Referred by:  Dr. Love  Referred for: Cytoxan    HISTORY OF PRESENT ILLNESS:   Mr. Nunez presents to clinic today with his wife for Cytoxan infusion for CNS Vasculitis. This will be dose #27  Cytoxan was stopped in 2018, and neurologic symptoms worsened so cytoxan was resumed.. Since resuming Cytoxan, neurologic symptoms have improved though they are still profound per wife.  Other than moderate cytopenais mid cycles tolerating well.  Has upcoming rheumatology appt to discuss alternative therapies in light of ongoing concerns to prolonged high dose alkylator exposure.    Presents to clinic with wife.          PAST MEDICAL HISTORY:   Past Medical History:   Diagnosis Date    Amblyopia     Cataract     CNS vasculitis 6/2/2017    Follows with Dr. Love By mri.  Several active lesions, many old. 5/13: MRA brain/neck, MRI brain w/wo contrast show no vascular occlusion, multiple foci of hyperintensity in deep cerebral periventricular white matter in pattern of demyelinating process.  5/17: MRI spine performed, no spinal cord lesions. Hospitalization 6/2017. EEG was performed negative for seizures.  Repeat MRI showing new lesion, question whether this was demyelination versus CVA. 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 Findings compatible with a small acute lacunar infarct adjacent to the left frontal horn.  Nonspecific enhancement just inferior to this region and extending into the left basal ganglia, as well as within the inferior aspect of the left cerebellum.  No evidence of a focal mass. 2.  Extensive increased signal intensity involving the periventricular white matter compatible with demyelinating disease versus vasculitis. 3.   Clinical correlation and followup recommended. 4.  This reportedly flattened in the EPIC and the corpus callosum medical record system.    Mixed hyperlipidemia 11/9/2012    NAFLD (nonalcoholic fatty liver disease) 10/11/2013    CHASE (nonalcoholic steatohepatitis)     Obesity     Stroke     Type 2 diabetes mellitus with diabetic polyneuropathy, with long-term current use of insulin 5/14/2017    Type 2 diabetes mellitus with hyperglycemia, with long-term current use of insulin 10/11/2013       PAST SURGICAL HISTORY:   Past Surgical History:   Procedure Laterality Date    BIOPSY LIVER AND ULTRASOUND N/A 6/9/2015    Performed by [facility] at [facility] OR 2ND FLR    COLONOSCOPY N/A 5/21/2019    Performed by Aelx Ascencio [facility] at [facility] ENDO    COLONOSCOPY N/A 2/21/2014    Performed by Mario Aceves [facility] at [facility] ENDO    ESOPHAGOGASTRODUODENOSCOPY (EGD) N/A 5/29/2017    Performed by Hai Carter MD at [facility] ENDO (2ND FLR)    QTPIISZAH-WGEL-U-CATH N/A 12/7/2018    Performed by [facility] Provider at [facility] OR 2ND FLR    SKIN CANCER EXCISION         PAST SOCIAL HISTORY:   reports that he has never smoked. He has never used smokeless tobacco. He reports that he does not drink alcohol or use drugs.    FAMILY HISTORY:  Family History   Problem Relation Age of Onset    Heart disease Mother     Hypertension Mother     Heart failure Mother     Cataracts Mother     Cancer Father         lung    Diabetes Maternal Aunt     Stroke Sister     Rheum arthritis Paternal Grandmother     Amblyopia Neg Hx     Blindness Neg Hx     Glaucoma Neg Hx     Macular degeneration Neg Hx     Retinal detachment Neg Hx     Strabismus Neg Hx        CURRENT MEDICATIONS:   Current Outpatient Medications   Medication Sig    alclomethasone (ACLOVATE) 0.05 % cream     alendronate (FOSAMAX) 70 MG tablet Take 1 tablet (70 mg total) by mouth every 7 days.    aspirin (ECOTRIN) 81 MG EC tablet Take 81 mg by mouth once  "daily.    atenolol (TENORMIN) 50 MG tablet Take 1 tablet (50 mg total) by mouth once daily.    atorvastatin (LIPITOR) 40 MG tablet Take 1 tablet (40 mg total) by mouth once daily.    blood sugar diagnostic (TRUE METRIX GLUCOSE TEST STRIP) Strp Test blood sugar four times a day    diltiaZEM (DILACOR XR) 240 MG CDCR Take 1 capsule (240 mg total) by mouth once daily.    flash glucose sensor (FREESTYLE ARELY 14 DAY SENSOR) Kit Glucose checking 4 times a day    FREESTYLE ARELY 14 DAY READER Misc GLUCOSE TESTING : FASTING AND PRIOR TO MEALS    insulin (BASAGLAR KWIKPEN U-100 INSULIN) glargine 100 units/mL (3mL) SubQ pen Inject 40 Units into the skin 2 (two) times daily. Up to 60 BID with cytoxan    insulin syringe-needle U-100 (INSULIN SYRINGE) 1/2 mL 30 gauge x 5/16 Syrg Use 3x/day    ketoconazole (NIZORAL) 2 % cream     lancets 30 gauge Misc Test blood sugar four times a day    liraglutide 0.6 mg/0.1 mL, 18 mg/3 mL, subq PNIJ (VICTOZA 3-TOMASZ) 0.6 mg/0.1 mL (18 mg/3 mL) PnIj Inject 1.8 mg into the skin once daily.    metFORMIN (GLUCOPHAGE-XR) 500 MG 24 hr tablet Take 2 tablets (1,000 mg total) by mouth 2 (two) times daily with meals.    methylphenidate HCl (RITALIN) 10 MG tablet Take 1 tablet (10 mg total) by mouth 2 (two) times daily with meals.    omega-3 fatty acids-vitamin E (FISH OIL) 1,000 mg Cap Take 1 capsule by mouth 2 (two) times daily.    pen needle, diabetic (BD ULTRA-FINE ANA PEN NEEDLE) 32 gauge x 5/32" Ndle 4x daily insulin pen needle, relion    QUEtiapine (SEROQUEL) 25 MG Tab Take 1 tablet (25 mg total) by mouth every evening.    sertraline (ZOLOFT) 100 MG tablet TAKE 1 & 1/2 (ONE & ONE-HALF) TABLETS BY MOUTH ONCE DAILY    VICTOZA 2-TOMASZ 0.6 mg/0.1 mL (18 mg/3 mL) PnIj INJECT 1.8 MG INTO THE SKIN ONCE DAILY    vitamin D 1000 units Tab Take 5 tablets (5,000 Units total) by mouth once daily.    insulin aspart U-100 (NOVOLOG) 100 unit/mL InPn pen Inject 20 Units into the skin 3 (three) " times daily with meals. (Patient taking differently: Inject 20 Units into the skin 4 (four) times daily. )    irbesartan (AVAPRO) 300 MG tablet Take 1 tablet (300 mg total) by mouth every evening.     No current facility-administered medications for this visit.      ALLERGIES:   Review of patient's allergies indicates:  No Known Allergies    REVIEW OF SYSTEMS:   General ROS: Negative  Psychological ROS: Confusion much improved.   Ophthalmic ROS: negative  ENT ROS: negative  Allergy and Immunology ROS: negative  Hematological and Lymphatic ROS: negative  Endocrine ROS: negative  Respiratory ROS: negative  Cardiovascular ROS: negative  Gastrointestinal ROS: negative  Genito-Urinary ROS: negative  Musculoskeletal ROS: negative  Neurological ROS: see HPI  Dermatological ROS: negative    PHYSICAL EXAM:   Vitals:    09/17/19 1011   BP: 119/74   Pulse: 66   Resp: 16   Temp: 98 °F (36.7 °C)       General - well developed, well nourished; much more interactive today. A+Ox3 today.  Head & Face - no sinus tenderness  Eyes - normal conjunctivae and lids   ENT - normal external auditory canals, no mouth sores  Chest and Lung - normal respiratory effort, clear to auscultation bilaterally   Cardiovascular - RRR with no MGR, normal S1 and S2; no pedal edema  Abdomen -  soft, nontender, no palpable hepatomegaly or splenomegaly  Lymph - no palpable lymphadenopathy  Extremities - unremarkable nails and digits  Heme - no bruising, petechiae, pallor  Skin - no rashes or lesions   Psych - Normal mood and affect. Intermittent confusion noted (forgot what he had for lunch).    ECOG Performance Status: (foot note - ECOG PS provided by Eastern Cooperative Oncology Group) 1 - Symptomatic but completely ambulatory    Karnofsky Performance Score:  80%- Normal Activity with Effort: Some Symptoms of Disease  DATA:   Lab Results   Component Value Date    WBC 6.15 09/17/2019    HGB 11.3 (L) 09/17/2019    HCT 32.3 (L) 09/17/2019    MCV 84  09/17/2019     (L) 09/17/2019     Gran # (ANC)   Date Value Ref Range Status   09/17/2019 4.4 1.8 - 7.7 K/uL Final     Gran%   Date Value Ref Range Status   09/17/2019 71.7 38.0 - 73.0 % Final     CMP  Sodium   Date Value Ref Range Status   09/17/2019 140 136 - 145 mmol/L Final     Potassium   Date Value Ref Range Status   09/17/2019 3.9 3.5 - 5.1 mmol/L Final     Chloride   Date Value Ref Range Status   09/17/2019 105 95 - 110 mmol/L Final     CO2   Date Value Ref Range Status   09/17/2019 24 23 - 29 mmol/L Final     Glucose   Date Value Ref Range Status   09/17/2019 200 (H) 70 - 110 mg/dL Final     BUN, Bld   Date Value Ref Range Status   09/17/2019 18 8 - 23 mg/dL Final     Creatinine   Date Value Ref Range Status   09/17/2019 1.3 0.5 - 1.4 mg/dL Final     Calcium   Date Value Ref Range Status   09/17/2019 9.1 8.7 - 10.5 mg/dL Final     Total Protein   Date Value Ref Range Status   09/17/2019 7.0 6.0 - 8.4 g/dL Final     Albumin   Date Value Ref Range Status   09/17/2019 3.9 3.5 - 5.2 g/dL Final   10/11/2013 4.3 3.6 - 5.1 g/dL Final     Comment:     @ Test Performed By:  [employer] [facility] AMELIA Almazan M.D.,   [address] [place] [id number]  [facility] #[id number]     Total Bilirubin   Date Value Ref Range Status   09/17/2019 0.5 0.1 - 1.0 mg/dL Final     Comment:     For infants and newborns, interpretation of results should be based  on gestational age, weight and in agreement with clinical  observations.  Premature Infant recommended reference ranges:  Up to 24 hours.............<8.0 mg/dL  Up to 48 hours............<12.0 mg/dL  3-5 days..................<15.0 mg/dL  6-29 days.................<15.0 mg/dL       Alkaline Phosphatase   Date Value Ref Range Status   09/17/2019 76 55 - 135 U/L Final     AST   Date Value Ref Range Status   09/17/2019 17 10 - 40 U/L Final     ALT   Date Value Ref Range Status   09/17/2019 14 10 - 44 U/L Final     Anion Gap    Date Value Ref Range Status   09/17/2019 11 8 - 16 mmol/L Final     eGFR if    Date Value Ref Range Status   09/17/2019 >60.0 >60 mL/min/1.73 m^2 Final     eGFR if non    Date Value Ref Range Status   09/17/2019 58.9 (A) >60 mL/min/1.73 m^2 Final     Comment:     Calculation used to obtain the estimated glomerular filtration  rate (eGFR) is the CKD-EPI equation.          ASSESSMENT AND PLAN:   Encounter Diagnosis   Name Primary?    CNS vasculitis Yes       CNS vasculitis  -continue CTX per neuro recs  -proceed with dose #26 Cytoxan was decreased to 600 mg/m2 (due to CrCl) with mesna support for CNS vasculitis, protocol per Dr. Love; improved symptoms since initiation; per son at visit today symptoms have reached a plateau; pt with fatigue and labile affect most days    -will continue per Neurology, failed Cytoxan hiatus and transition to Imuran Sept/Oct 2018. Restarted cytoxan 1/23/2018. Now much improved back on Cytoxan.  -last saw Neurology 7/23/19  -No contraindication to proceed with CTX today   -in light of concern for long term complications from prolonged alkylator exposure have discussed case with Dr. Love who recommended referral to rheumatology for possible non cytotoxic approaches; appt scheduled for nov 2019; will see if appt can be moved up to avoid another 1-2 cycles of ctx    Cytopenias- Anemia/thrombocytopenia  -mild anemia and thromboctyopenia 2/2 chemotherapy,   -Transfuse for Hgb<7 and Plt <10k, no indication to transfuse today       -fu with pcp for chronic medical conditions       F/U:  -cbc, cmp, T+S at [facility] in 2 weeks  -same labs , np appt and cytoxan infusion in 4 weeks; may cancel if transitions to non chemotherapy with rheum/neuro

## 2019-09-17 NOTE — NURSING
Pt arrived for labs from port.  Port flushes easily with good blood return, labs sent. Port left access for chemo today.  Pt now going to MD fine

## 2019-09-17 NOTE — PLAN OF CARE
Problem: Adult Inpatient Plan of Care  Goal: Plan of Care Review  Outcome: Ongoing (interventions implemented as appropriate)  Pt tolerated Cytoxan & Mesna today. NAD. Port flushed + blood return present, flushed. Hep lock and deaccesed. delcined AVS. Uses my [facility]. Discharged home. Ambulated independently with family.

## 2019-09-17 NOTE — Clinical Note
-cbc, cmp, T+S at [facility] in 2 weeks-same labs , np appt and cytoxan infusion in 4 weeks

## 2019-09-26 ENCOUNTER — OFFICE VISIT (OUTPATIENT)
Dept: FAMILY MEDICINE | Facility: CLINIC | Age: 59
End: 2019-09-26
Payer: MEDICARE

## 2019-09-26 VITALS
DIASTOLIC BLOOD PRESSURE: 56 MMHG | TEMPERATURE: 98 F | BODY MASS INDEX: 29.82 KG/M2 | WEIGHT: 213 LBS | OXYGEN SATURATION: 98 % | SYSTOLIC BLOOD PRESSURE: 90 MMHG | HEART RATE: 74 BPM | HEIGHT: 71 IN

## 2019-09-26 DIAGNOSIS — Z23 NEEDS FLU SHOT: ICD-10-CM

## 2019-09-26 DIAGNOSIS — E11.21 CONTROLLED TYPE 2 DIABETES MELLITUS WITH DIABETIC NEPHROPATHY, WITH LONG-TERM CURRENT USE OF INSULIN: ICD-10-CM

## 2019-09-26 DIAGNOSIS — Z79.4 CONTROLLED TYPE 2 DIABETES MELLITUS WITH DIABETIC NEPHROPATHY, WITH LONG-TERM CURRENT USE OF INSULIN: ICD-10-CM

## 2019-09-26 DIAGNOSIS — R41.89 COGNITIVE IMPAIRMENT: Primary | ICD-10-CM

## 2019-09-26 PROCEDURE — 99999 PR PBB SHADOW E&M-EST. PATIENT-LVL III: CPT | Mod: PBBFAC,,, | Performed by: INTERNAL MEDICINE

## 2019-09-26 PROCEDURE — G0008 ADMIN INFLUENZA VIRUS VAC: HCPCS | Mod: S$GLB,,, | Performed by: INTERNAL MEDICINE

## 2019-09-26 PROCEDURE — 3008F PR BODY MASS INDEX (BMI) DOCUMENTED: ICD-10-PCS | Mod: CPTII,S$GLB,, | Performed by: INTERNAL MEDICINE

## 2019-09-26 PROCEDURE — 3044F HG A1C LEVEL LT 7.0%: CPT | Mod: CPTII,S$GLB,, | Performed by: INTERNAL MEDICINE

## 2019-09-26 PROCEDURE — 99214 PR OFFICE/OUTPT VISIT, EST, LEVL IV, 30-39 MIN: ICD-10-PCS | Mod: 25,S$GLB,, | Performed by: INTERNAL MEDICINE

## 2019-09-26 PROCEDURE — 99499 RISK ADDL DX/OHS AUDIT: ICD-10-PCS | Mod: S$GLB,,, | Performed by: INTERNAL MEDICINE

## 2019-09-26 PROCEDURE — 3008F BODY MASS INDEX DOCD: CPT | Mod: CPTII,S$GLB,, | Performed by: INTERNAL MEDICINE

## 2019-09-26 PROCEDURE — 90686 IIV4 VACC NO PRSV 0.5 ML IM: CPT | Mod: S$GLB,,, | Performed by: INTERNAL MEDICINE

## 2019-09-26 PROCEDURE — 90686 FLU VACCINE (QUAD) GREATER THAN OR EQUAL TO 3YO PRESERVATIVE FREE IM: ICD-10-PCS | Mod: S$GLB,,, | Performed by: INTERNAL MEDICINE

## 2019-09-26 PROCEDURE — 3078F DIAST BP <80 MM HG: CPT | Mod: CPTII,S$GLB,, | Performed by: INTERNAL MEDICINE

## 2019-09-26 PROCEDURE — 3044F PR MOST RECENT HEMOGLOBIN A1C LEVEL <7.0%: ICD-10-PCS | Mod: CPTII,S$GLB,, | Performed by: INTERNAL MEDICINE

## 2019-09-26 PROCEDURE — 3078F PR MOST RECENT DIASTOLIC BLOOD PRESSURE < 80 MM HG: ICD-10-PCS | Mod: CPTII,S$GLB,, | Performed by: INTERNAL MEDICINE

## 2019-09-26 PROCEDURE — G0008 FLU VACCINE (QUAD) GREATER THAN OR EQUAL TO 3YO PRESERVATIVE FREE IM: ICD-10-PCS | Mod: S$GLB,,, | Performed by: INTERNAL MEDICINE

## 2019-09-26 PROCEDURE — 99999 PR PBB SHADOW E&M-EST. PATIENT-LVL III: ICD-10-PCS | Mod: PBBFAC,,, | Performed by: INTERNAL MEDICINE

## 2019-09-26 PROCEDURE — 99499 UNLISTED E&M SERVICE: CPT | Mod: S$GLB,,, | Performed by: INTERNAL MEDICINE

## 2019-09-26 PROCEDURE — 3074F SYST BP LT 130 MM HG: CPT | Mod: CPTII,S$GLB,, | Performed by: INTERNAL MEDICINE

## 2019-09-26 PROCEDURE — 99214 OFFICE O/P EST MOD 30 MIN: CPT | Mod: 25,S$GLB,, | Performed by: INTERNAL MEDICINE

## 2019-09-26 PROCEDURE — 3074F PR MOST RECENT SYSTOLIC BLOOD PRESSURE < 130 MM HG: ICD-10-PCS | Mod: CPTII,S$GLB,, | Performed by: INTERNAL MEDICINE

## 2019-09-26 RX ORDER — DONEPEZIL HYDROCHLORIDE 5 MG/1
5 TABLET, FILM COATED ORAL NIGHTLY
Qty: 30 TABLET | Refills: 2 | Status: ON HOLD | OUTPATIENT
Start: 2019-09-26 | End: 2020-01-07

## 2019-09-26 NOTE — PROGRESS NOTES
This note was created by combination of typed  and Dragon dictation.  Transcription errors may be present.  If there are any questions, please contact me.    Assessment & Plan:   Cognitive impairment  -trial of donepezil, start low-dose 2.5 for the 1st week, then 5 mg thereafter.  At next visit if no issues then increase to 10.  Wife can also send me a message in the interim if she wants to be more aggressive and increase it more quickly  -     donepezil (ARICEPT) 5 MG tablet; Take 1 tablet (5 mg total) by mouth every evening. Start with 1/2 tab daily x 1 week then whole tablet maintenance  Dispense: 30 tablet; Refill: 2    Controlled type 2 diabetes mellitus with diabetic nephropathy, with long-term current use of insulin  -check A1c with upcoming labs  -     Hemoglobin A1c; Future; Expected date: 09/26/2019    Needs flu shot  -     Influenza - Quadrivalent (PF)        There are no discontinued medications.    meds sent this encounter:  Medications Ordered This Encounter   Medications    donepezil (ARICEPT) 5 MG tablet     Sig: Take 1 tablet (5 mg total) by mouth every evening. Start with 1/2 tab daily x 1 week then whole tablet maintenance     Dispense:  30 tablet     Refill:  2       Follow Up: No follow-ups on file.    Subjective:     Chief Complaint   Patient presents with    Altered Mental Status       HPI  Bessy is a 59 y.o. male, last appointment with this clinic was Visit date not found.    He is here accompanied by his wife.  History obtained from the wife.  She notes that he is becoming more more difficult.  Not doing what she asks him to do.  He typically does better soon after the Cytoxan infusion but the further out the get from the infusion the worse he gets.  [facility] wants him to see Rheumatology to see if they think any other treatment options would be acceptable as they are concerned about long-term effects of the Cytoxan.  He was started on Ritalin and wife notes that it has helped  his energy a fair amount.  Without it he tends to be more vegetative.  He was also given a prescription for Seroquel and she is using it sparingly because it makes him too sedated.  When he is super anxious she may give it to him.  So for example last night he was worried and anxious about his upcoming visit today so she gave it to him last night.  And it was hard to wake him this morning.    She is concerned because he seems to be exhibiting more cognitive impairment.  Forgetful.  Less cooperative.  He is already on Zoloft moderate dose 150 mg.    She has friends and family who have had issues with dementia and they seem to have found help with medications for this and I will try him on Aricept and titrate up.    He is due for an A1c.  Wife manages his insulin.  Give some extra insulin at the time of his Cytoxan because his sugars jump up.    Answers for HPI/ROS submitted by the patient on 9/25/2019   activity change: No  unexpected weight change: No  rhinorrhea: No  trouble swallowing: No  visual disturbance: No  chest tightness: No  polyuria: No  difficulty urinating: No  joint swelling: No  arthralgias: No  confusion: Yes  dysphoric mood: Yes      Patient Care Team:  Edgar Nova MD as PCP - General (Internal Medicine)  Promise Steele NP as Nurse Practitioner (Endocrinology)  Shana Love MD as Consulting Physician (Neurology)    Patient Active Problem List    Diagnosis Date Noted    Diarrhea 07/18/2019    Anemia associated with chemotherapy 07/18/2019    Chemotherapy-induced thrombocytopenia 07/18/2019    MANISH (acute kidney injury) 06/21/2019    Screen for colon cancer 05/21/2019    Dementia with behavioral disturbance 04/30/2019    Osteoporosis with current pathological fracture with routine healing from steroids; compression fx 2017; 4/2019 bisphosphonate 04/10/2019    Duodenal ulcer 6/2017 EGD - duodenum and gastric ulcer s/p cautery 03/28/2019 6/2017 EGD showing duodenal and gastric ulcer  which were treated with cautery.      Renal cyst bilateral simple and minimally, complicated renal cysts. 03/28/2019   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    Central scotoma, right eye 02/06/2019    Encounter for chemotherapy management 12/07/2018    Encounter for long term current use of cyclophosphamide 10/30/2018    Need for Tdap vaccination 08/29/2018    Need for hepatitis A and B vaccination 08/29/2018    Need for pneumococcal vaccination 08/29/2018    Acquired immunocompromised state 08/29/2018     cyclophosphamide      Controlled type 2 diabetes mellitus with diabetic nephropathy, with long-term current use of insulin 08/01/2018    Chemotherapy induced nausea and vomiting 06/12/2018    Dysphasia     Aphasia 12/04/2017    Dysphonia 12/04/2017    Cognitive deficits 12/04/2017    Closed compression fracture of L4 lumbar vertebra on MRI 11/2017 11/15/2017     11/2017 MRI L spine: Acute burst type fracture of the L4 vertebra with fracture line extending to the posterior cortex of the L4 vertebra. There is loss of central vertebral body height of the superior end plate of approximately 40%.      Adverse effect of glucocorticoids and synthetic analogues, initial encounter 09/15/2017    Lacunar stroke of left subthalamic region 09/14/2017 9/14/2017 MRI brain: 1. Findings compatible with a small acute lacunar infarct adjacent to the left frontal horn.  Nonspecific enhancement just inferior to this region and extending into the left basal ganglia, as well as within the inferior aspect of the left cerebellum.  No evidence of a focal mass.  2.  Extensive increased signal intensity involving the periventricular white matter compatible with demyelinating disease versus vasculitis.  3.  Clinical correlation and followup recommended.  4.  This reportedly flattened in the EPIC and the corpus callosum medical record system.      Episodic confusion      Improved now  that back on cyclophosphamide      Cytopenia 08/30/2017    Impaired functional mobility, balance, gait, and endurance 06/14/2017    Urinary incontinence 06/04/2017    CNS vasculitis failed imuran; on cytoxan 06/02/2017     Failed Cytoxan hiatus and transition to Imuran Sept/Oct 2018;   Now much improved back on Cytoxan;   Refer to Dr. Daley of rheumatology for second opinion on management      Encephalopathy 05/26/2017    Type 2 diabetes mellitus with diabetic polyneuropathy, with long-term current use of insulin 05/14/2017    Amblyopia 05/13/2017    Tubular adenoma of colon 2014 C scope polyp repeat 3 years; 5/21/2019 colonscopy 8 ascending 4 descending polyps path 2 tubular adenomas, rest are inflammatory polyps; repeat 1 year 01/30/2015 2/21/2014 colonoscopy showing mild nonspecific acute and chronic inflammation of the ascending colon.  Hyperplastic polyp. Repeat 3 years.  5/21/2019 colonscopy 8 ascending 4 descending polyps path 2 tubular adenomas, rest are inflammatory polyps; repeat 1 year      Lipodystrophy 10/11/2013    NAFLD (nonalcoholic fatty liver disease) 6/12/2015 liver biopsy showing advanced stage fibrosis with bridging fibrosis and scattered nodules. 10/11/2013     6/12/2015 liver biopsy showing advanced stage fibrosis with bridging fibrosis and scattered nodules.      Obesity (BMI 30-39.9) 10/11/2013    ED (erectile dysfunction) 10/11/2013     JOHN with CPAP at night 10/11/2013    Episode of recurrent major depressive disorder 11/09/2012    Mixed hyperlipidemia 11/09/2012    Essential hypertension 5/2016 TTE diastolic dysfunction 11/09/2012 5/11/2016 TTE:   1 - Mildly depressed left ventricular systolic function (EF 45-50%).  Mild global hypokinesis at rest. 2 - Eccentric hypertrophy. 3 - Left ventricular diastolic dysfunction.         PAST MEDICAL HISTORY:  Past Medical History:   Diagnosis Date    Amblyopia     Cataract     CNS vasculitis 6/2/2017    Follows with   [facility].  Several active lesions, many old. 5/13: MRA brain/neck, MRI brain w/wo contrast show no vascular occlusion, multiple foci of hyperintensity in deep cerebral periventricular white matter in pattern of demyelinating process.  5/17: MRI spine performed, no spinal cord lesions. Hospitalization 6/2017. EEG was performed negative for seizures.  Repeat MRI showing new lesion, question whether this was demyelination versus CVA. 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 Findings compatible with a small acute lacunar infarct adjacent to the left frontal horn.  Nonspecific enhancement just inferior to this region and extending into the left basal ganglia, as well as within the inferior aspect of the left cerebellum.  No evidence of a focal mass. 2.  Extensive increased signal intensity involving the periventricular white matter compatible with demyelinating disease versus vasculitis. 3.  Clinical correlation and followup recommended. 4.  This reportedly flattened in the EPIC and the corpus callosum medical record system.    Mixed hyperlipidemia 11/9/2012    NAFLD (nonalcoholic fatty liver disease) 10/11/2013    CHASE (nonalcoholic steatohepatitis)     Obesity     Stroke     Type 2 diabetes mellitus with diabetic polyneuropathy, with long-term current use of insulin 5/14/2017    Type 2 diabetes mellitus with hyperglycemia, with long-term current use of insulin 10/11/2013       PAST SURGICAL HISTORY:  Past Surgical History:   Procedure Laterality Date    COLONOSCOPY N/A 5/21/2019    Procedure: COLONOSCOPY;  Surgeon: Alex Ascencio MD;  [facility];  Service: Endoscopy;  Laterality: N/A;  confirmed appt-sp    INSERTION OF TUNNELED CENTRAL VENOUS CATHETER (CVC) WITH SUBCUTANEOUS PORT  N/A 12/7/2018    Procedure: [id number];  Surgeon: Luan Diagnostic Provider;  Location: [facility] OR [address] [place] [address];  Service: Radiology;  Laterality: N/A;    SKIN CANCER EXCISION         SOCIAL HISTORY:  Social History     Socioeconomic History    Marital status:      Spouse name: Not on file    Number of children: Not on file    Years of education: Not on file    Highest education level: Not on file   Occupational History    Occupation: unemployed   Social Needs    Financial resource strain: Very hard    Food insecurity:     Worry: Never true     Inability: Never true    Transportation needs:     Medical: No     Non-medical: No   Tobacco Use    Smoking status: Never Smoker    Smokeless tobacco: Never Used   Substance and Sexual Activity    Alcohol use: No     Alcohol/week: 0.0 standard drinks     Frequency: Never     Drinks per session: 1 or 2     Binge frequency: Never    Drug use: No    Sexual activity: Not on file   Lifestyle    Physical activity:     Days per week: 0 days     Minutes per session: 0 min    Stress: Only a little   Relationships    Social connections:     Talks on phone: More than three times a week     Gets together: Once a week     Attends [employer] service: Not on file     Active member of club or organization: No     Attends meetings of clubs or organizations: Never     Relationship status:    Other Topics Concern    Not on file   Social History Narrative    Not on file       ALLERGIES AND MEDICATIONS: updated and reviewed.  Review of patient's allergies indicates:  No Known Allergies  Current Outpatient Medications   Medication Sig Dispense Refill    alclomethasone (ACLOVATE) 0.05 % cream       alendronate (FOSAMAX) 70 MG tablet Take 1 tablet (70 mg total) by mouth every 7 days. 4 tablet 11    aspirin (ECOTRIN) 81 MG EC tablet Take 81 mg by mouth once daily.      atenolol (TENORMIN) 50 MG tablet Take 1 tablet (50 mg total) by mouth once daily. 90 tablet 3  "   atorvastatin (LIPITOR) 40 MG tablet Take 1 tablet (40 mg total) by mouth once daily. 90 tablet 3    blood sugar diagnostic (TRUE METRIX GLUCOSE TEST STRIP) Strp Test blood sugar four times a day 400 each 11    diltiaZEM (DILACOR XR) 240 MG CDCR Take 1 capsule (240 mg total) by mouth once daily. 90 capsule 3    flash glucose sensor (FREESTYLE ARELY 14 DAY SENSOR) Kit Glucose checking 4 times a day 2 kit 11    FREESTYLE ARELY 14 DAY READER Misc GLUCOSE TESTING : FASTING AND PRIOR TO MEALS 1 each 0    insulin (BASAGLAR KWIKPEN U-100 INSULIN) glargine 100 units/mL (3mL) SubQ pen Inject 40 Units into the skin 2 (two) times daily. Up to 60 BID with cytoxan 2 Box 11    insulin syringe-needle U-100 (INSULIN SYRINGE) 1/2 mL 30 gauge x 5/16 Syrg Use 3x/day 300 each 3    ketoconazole (NIZORAL) 2 % cream       lancets 30 gauge Misc Test blood sugar four times a day 100 each 11    liraglutide 0.6 mg/0.1 mL, 18 mg/3 mL, subq PNIJ (VICTOZA 3-TOMASZ) 0.6 mg/0.1 mL (18 mg/3 mL) PnIj Inject 1.8 mg into the skin once daily. 9 mL 11    metFORMIN (GLUCOPHAGE-XR) 500 MG 24 hr tablet Take 2 tablets (1,000 mg total) by mouth 2 (two) times daily with meals. 360 tablet 3    methylphenidate HCl (RITALIN) 10 MG tablet Take 1 tablet (10 mg total) by mouth 2 (two) times daily with meals. 60 tablet 0    omega-3 fatty acids-vitamin E (FISH OIL) 1,000 mg Cap Take 1 capsule by mouth 2 (two) times daily.  0    pen needle, diabetic (BD ULTRA-FINE ANA PEN NEEDLE) 32 gauge x 5/32" Ndle 4x daily insulin pen needle, relion 400 each 3    QUEtiapine (SEROQUEL) 25 MG Tab Take 1 tablet (25 mg total) by mouth every evening. 30 tablet 11    sertraline (ZOLOFT) 100 MG tablet TAKE 1 & 1/2 (ONE & ONE-HALF) TABLETS BY MOUTH ONCE DAILY 45 tablet 1    VICTOZA 2-TOMASZ 0.6 mg/0.1 mL (18 mg/3 mL) PnIj INJECT 1.8 MG INTO THE SKIN ONCE DAILY 9 Syringe 1    vitamin D 1000 units Tab Take 5 tablets (5,000 Units total) by mouth once daily.      insulin " "aspart U-100 (NOVOLOG) 100 unit/mL InPn pen Inject 20 Units into the skin 3 (three) times daily with meals. (Patient taking differently: Inject 20 Units into the skin 4 (four) times daily. ) 18 mL 11    irbesartan (AVAPRO) 300 MG tablet Take 1 tablet (300 mg total) by mouth every evening. 90 tablet 3     No current facility-administered medications for this visit.        Review of Systems   HENT: Negative for hearing loss.    Eyes: Negative for discharge.   Respiratory: Negative for wheezing.    Cardiovascular: Negative for chest pain and palpitations.   Gastrointestinal: Negative for blood in stool, constipation, diarrhea and vomiting.   Genitourinary: Negative for hematuria and urgency.   Musculoskeletal: Negative for neck pain.   Neurological: Negative for weakness and headaches.   Endo/Heme/Allergies: Negative for polydipsia.       Objective:   Physical Exam   Vitals:    09/26/19 1555   BP: (!) 90/56   BP Location: Right arm   Patient Position: Sitting   BP Method: Medium (Manual)   Pulse: 74   Temp: 97.9 °F (36.6 °C)   TempSrc: Oral   SpO2: 98%   Weight: 96.6 kg (213 lb)   Height: 5' 11" (1.803 m)    Body mass index is 29.71 kg/m².  Weight: 96.6 kg (213 lb)   Height: 5' 11" (180.3 cm)     Physical Exam   Constitutional: He appears well-developed and well-nourished. No distress.   HENT:   Head: Normocephalic and atraumatic.   Eyes: No scleral icterus.   Cardiovascular:   Pulses:       Dorsalis pedis pulses are 2+ on the right side, and 2+ on the left side.        Posterior tibial pulses are 2+ on the right side, and 2+ on the left side.   Pulmonary/Chest: Effort normal.   Musculoskeletal:        Right foot: There is no deformity.        Left foot: There is no deformity.   Feet:   Right Foot:   Protective Sensation: 5 sites tested. 5 sites sensed.   Skin Integrity: Negative for ulcer, blister, skin breakdown, erythema or warmth.   Left Foot:   Protective Sensation: 5 sites tested. 5 sites sensed.   Skin " Integrity: Negative for ulcer, blister, skin breakdown, erythema or warmth.   Neurological: He is alert. No cranial nerve deficit.   Skin: Skin is warm and dry.   Psychiatric:   Paucity of speech.  Normal eye contact when spoken to.  Brief answers.  No obvious internal stimuli.  Appropriate response to questions.

## 2019-10-03 DIAGNOSIS — I10 ESSENTIAL HYPERTENSION: Chronic | ICD-10-CM

## 2019-10-03 RX ORDER — DILTIAZEM HYDROCHLORIDE 240 MG/1
CAPSULE, EXTENDED RELEASE ORAL
Qty: 90 CAPSULE | Refills: 3 | Status: SHIPPED | OUTPATIENT
Start: 2019-10-03 | End: 2019-12-19 | Stop reason: SDUPTHER

## 2019-10-05 DIAGNOSIS — F32.5 MAJOR DEPRESSIVE DISORDER WITH SINGLE EPISODE, IN FULL REMISSION: Chronic | ICD-10-CM

## 2019-10-05 DIAGNOSIS — E78.2 MIXED HYPERLIPIDEMIA: ICD-10-CM

## 2019-10-06 RX ORDER — ATORVASTATIN CALCIUM 40 MG/1
TABLET, FILM COATED ORAL
Qty: 90 TABLET | Refills: 3 | Status: SHIPPED | OUTPATIENT
Start: 2019-10-06 | End: 2020-08-05 | Stop reason: SDUPTHER

## 2019-10-07 ENCOUNTER — PATIENT MESSAGE (OUTPATIENT)
Dept: PSYCHIATRY | Facility: CLINIC | Age: 59
End: 2019-10-07

## 2019-10-07 RX ORDER — SERTRALINE HYDROCHLORIDE 100 MG/1
TABLET, FILM COATED ORAL
Qty: 45 TABLET | Refills: 1 | Status: SHIPPED | OUTPATIENT
Start: 2019-10-07 | End: 2019-12-19 | Stop reason: SDUPTHER

## 2019-10-10 DIAGNOSIS — R53.83 FATIGUE, UNSPECIFIED TYPE: ICD-10-CM

## 2019-10-10 NOTE — TELEPHONE ENCOUNTER
----- Message from Daniel Selby sent at 10/10/2019  4:54 PM CDT -----  Contact: Bekah ( spouse )   Rx Refill/Request     Is this a Refill or New Rx:  Yes ( pt is out of medication )   Rx Name and Strength: methylphenidate HCl (RITALIN) 10 MG tablet     Preferred Pharmacy with phone number:     [facility] [address] [place] [id number]  Phone: [phone] Fax: [phone]

## 2019-10-11 RX ORDER — METHYLPHENIDATE HYDROCHLORIDE 10 MG/1
10 TABLET ORAL 2 TIMES DAILY WITH MEALS
Qty: 60 TABLET | Refills: 0 | Status: SHIPPED | OUTPATIENT
Start: 2019-10-11 | End: 2019-12-03 | Stop reason: SDUPTHER

## 2019-10-14 ENCOUNTER — TELEPHONE (OUTPATIENT)
Dept: HEMATOLOGY/ONCOLOGY | Facility: CLINIC | Age: 59
End: 2019-10-14

## 2019-10-15 NOTE — PROGRESS NOTES
SECTION OF HEMATOLOGY AND BONE MARROW TRANSPLANT    10/16/2019  Referred by:  Dr. Love  Referred for: Cytoxan    HISTORY OF PRESENT ILLNESS:   Mr. Nunez presents to clinic today with his wife for Cytoxan infusion for CNS Vasculitis. This will be dose #28. Cytoxan was stopped in 2018, and neurologic symptoms worsened so cytoxan was resumed. Since resuming Cytoxan, neurologic symptoms have improved. He notes profound chronic fatigue, but states energy levels improved right after receiving cytoxan. Other than moderate cytopenais mid cycles, pt is tolerating this well. Has upcoming rheumatology appt to discuss alternative therapies in light of ongoing concerns to prolonged high dose alkylator exposure. This appt is scheduled for 11/4/19, so he is planned to proceed with next cycle today. He missed his lab appt yesterday due to fatigue, so he was scheduled for labs today instead. Will will proceed with therapy today and schedule him for labs in 2 weeks and another tentative appt in 4 weeks for another cycle. Have informed pt and his spouse to cancel if rheum changes his therapy.    PAST MEDICAL HISTORY:   Past Medical History:   Diagnosis Date    Amblyopia     Cataract     CNS vasculitis 6/2/2017    Follows with Dr. Love By mri.  Several active lesions, many old. 5/13: MRA brain/neck, MRI brain w/wo contrast show no vascular occlusion, multiple foci of hyperintensity in deep cerebral periventricular white matter in pattern of demyelinating process.  5/17: MRI spine performed, no spinal cord lesions. Hospitalization 6/2017. EEG was performed negative for seizures.  Repeat MRI showing new lesion, question whether this was demyelination versus CVA. 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 Findings  compatible with a small acute lacunar infarct adjacent to the left frontal horn.  Nonspecific enhancement just inferior to this region and extending into the left basal ganglia, as well as within the inferior aspect of the left cerebellum.  No evidence of a focal mass. 2.  Extensive increased signal intensity involving the periventricular white matter compatible with demyelinating disease versus vasculitis. 3.  Clinical correlation and followup recommended. 4.  This reportedly flattened in the EPIC and the corpus callosum medical record system.    Mixed hyperlipidemia 11/9/2012    NAFLD (nonalcoholic fatty liver disease) 10/11/2013    CHASE (nonalcoholic steatohepatitis)     Obesity     Stroke     Type 2 diabetes mellitus with diabetic polyneuropathy, with long-term current use of insulin 5/14/2017    Type 2 diabetes mellitus with hyperglycemia, with long-term current use of insulin 10/11/2013       PAST SURGICAL HISTORY:   Past Surgical History:   Procedure Laterality Date    COLONOSCOPY N/A 5/21/2019    Procedure: COLONOSCOPY;  Surgeon: Alex Ascencio MD;  Location: [facility];  Service: Endoscopy;  Laterality: N/A;  confirmed appt-sp    INSERTION OF TUNNELED CENTRAL VENOUS CATHETER (CVC) WITH SUBCUTANEOUS PORT N/A 12/7/2018    Procedure: [id number];  Surgeon: Luan Diagnostic Provider;  Location: [facility] OR [address] [place] [address];  Service: Radiology;  Laterality: N/A;    SKIN CANCER EXCISION         PAST SOCIAL HISTORY:   reports that he has never smoked. He has never used smokeless tobacco. He reports that he does not drink alcohol or use drugs.    FAMILY HISTORY:  Family History   Problem Relation Age of Onset    Heart disease Mother     Hypertension Mother     Heart failure Mother     Cataracts Mother     Cancer Father         lung    Diabetes Maternal Aunt     Stroke Sister     Rheum arthritis Paternal Grandmother     Amblyopia Neg Hx     Blindness Neg Hx     Glaucoma Neg Hx     Macular  degeneration Neg Hx     Retinal detachment Neg Hx     Strabismus Neg Hx        CURRENT MEDICATIONS:   Current Outpatient Medications   Medication Sig    alclomethasone (ACLOVATE) 0.05 % cream     alendronate (FOSAMAX) 70 MG tablet Take 1 tablet (70 mg total) by mouth every 7 days.    aspirin (ECOTRIN) 81 MG EC tablet Take 81 mg by mouth once daily.    atenolol (TENORMIN) 50 MG tablet Take 1 tablet (50 mg total) by mouth once daily.    atorvastatin (LIPITOR) 40 MG tablet TAKE 1 TABLET BY MOUTH ONCE DAILY    blood sugar diagnostic (TRUE METRIX GLUCOSE TEST STRIP) Strp Test blood sugar four times a day    DILT- mg CDCR TAKE 1 CAPSULE BY MOUTH ONCE DAILY    donepezil (ARICEPT) 5 MG tablet Take 1 tablet (5 mg total) by mouth every evening. Start with 1/2 tab daily x 1 week then whole tablet maintenance    flash glucose sensor (FREESTYLE ARELY 14 DAY SENSOR) Kit Glucose checking 4 times a day    FREESTYLE ARELY 14 DAY READER Misc GLUCOSE TESTING : FASTING AND PRIOR TO MEALS    insulin (BASAGLAR KWIKPEN U-100 INSULIN) glargine 100 units/mL (3mL) SubQ pen Inject 40 Units into the skin 2 (two) times daily. Up to 60 BID with cytoxan    insulin aspart U-100 (NOVOLOG) 100 unit/mL InPn pen Inject 20 Units into the skin 3 (three) times daily with meals. (Patient taking differently: Inject 20 Units into the skin 4 (four) times daily. )    insulin syringe-needle U-100 (INSULIN SYRINGE) 1/2 mL 30 gauge x 5/16 Syrg Use 3x/day    irbesartan (AVAPRO) 300 MG tablet Take 1 tablet (300 mg total) by mouth every evening.    lancets 30 gauge Misc Test blood sugar four times a day    metFORMIN (GLUCOPHAGE-XR) 500 MG 24 hr tablet Take 2 tablets (1,000 mg total) by mouth 2 (two) times daily with meals.    methylphenidate HCl (RITALIN) 10 MG tablet Take 1 tablet (10 mg total) by mouth 2 (two) times daily with meals.    omega-3 fatty acids-vitamin E (FISH OIL) 1,000 mg Cap Take 1 capsule by mouth 2 (two) times daily.     "pen needle, diabetic (BD ULTRA-FINE ANA PEN NEEDLE) 32 gauge x 5/32" Ndle 4x daily insulin pen needle, relion    QUEtiapine (SEROQUEL) 25 MG Tab Take 1 tablet (25 mg total) by mouth every evening.    sertraline (ZOLOFT) 100 MG tablet TAKE 1 & 1/2 (ONE & ONE-HALF) TABLETS BY MOUTH ONCE DAILY    vitamin D 1000 units Tab Take 5 tablets (5,000 Units total) by mouth once daily.    ketoconazole (NIZORAL) 2 % cream     liraglutide 0.6 mg/0.1 mL, 18 mg/3 mL, subq PNIJ (VICTOZA 3-TOMASZ) 0.6 mg/0.1 mL (18 mg/3 mL) PnIj Inject 1.8 mg into the skin once daily. (Patient not taking: Reported on 10/16/2019)    VICTOZA 2-TOMASZ 0.6 mg/0.1 mL (18 mg/3 mL) PnIj INJECT 1.8 MG INTO THE SKIN ONCE DAILY (Patient not taking: Reported on 10/16/2019)     No current facility-administered medications for this visit.      ALLERGIES:   Review of patient's allergies indicates:  No Known Allergies    REVIEW OF SYSTEMS:   General ROS: Profound fatigue  Psychological ROS: Confusion much improved.   Ophthalmic ROS: negative  ENT ROS: negative  Allergy and Immunology ROS: negative  Hematological and Lymphatic ROS: negative  Endocrine ROS: negative  Respiratory ROS: negative  Cardiovascular ROS: negative  Gastrointestinal ROS: negative  Genito-Urinary ROS: negative  Musculoskeletal ROS: negative  Neurological ROS: see HPI  Dermatological ROS: negative    PHYSICAL EXAM:   Vitals:    10/16/19 1107   BP: (!) 157/100   Pulse: 105   Temp: 98.5 °F (36.9 °C)       General - well developed, well nourished; much more interactive today. A+Ox3 today.  Head & Face - no sinus tenderness  Eyes - normal conjunctivae and lids   ENT - no mouth sores  Chest and Lung - normal respiratory effort, clear to auscultation bilaterally   Cardiovascular - RRR with no MGR, normal S1 and S2; no pedal edema  Abdomen -  soft, nontender, no palpable hepatomegaly or splenomegaly  Extremities - unremarkable nails and digits  Heme - no bruising, petechiae, pallor  Skin - no rashes or " lesions   Psych - Normal mood and affect. Intermittent confusion noted     ECOG Performance Status: (foot note - ECOG PS provided by Eastern Cooperative Oncology Group) 1 - Symptomatic but completely ambulatory    Karnofsky Performance Score:  80%- Normal Activity with Effort: Some Symptoms of Disease  DATA:   Lab Results   Component Value Date    WBC 7.14 10/16/2019    HGB 11.3 (L) 10/16/2019    HCT 33.9 (L) 10/16/2019    MCV 86 10/16/2019     (L) 10/16/2019     Gran # (ANC)   Date Value Ref Range Status   10/16/2019 4.9 1.8 - 7.7 K/uL Final     Gran%   Date Value Ref Range Status   10/16/2019 68.8 38.0 - 73.0 % Final     CMP  Sodium   Date Value Ref Range Status   10/16/2019 146 (H) 136 - 145 mmol/L Final     Potassium   Date Value Ref Range Status   10/16/2019 4.3 3.5 - 5.1 mmol/L Final     Chloride   Date Value Ref Range Status   10/16/2019 109 95 - 110 mmol/L Final     CO2   Date Value Ref Range Status   10/16/2019 26 23 - 29 mmol/L Final     Glucose   Date Value Ref Range Status   10/16/2019 145 (H) 70 - 110 mg/dL Final     BUN, Bld   Date Value Ref Range Status   10/16/2019 13 8 - 23 mg/dL Final     Creatinine   Date Value Ref Range Status   10/16/2019 1.3 0.5 - 1.4 mg/dL Final     Calcium   Date Value Ref Range Status   10/16/2019 9.9 8.7 - 10.5 mg/dL Final     Total Protein   Date Value Ref Range Status   10/16/2019 7.5 6.0 - 8.4 g/dL Final     Albumin   Date Value Ref Range Status   10/16/2019 4.2 3.5 - 5.2 g/dL Final   10/11/2013 4.3 3.6 - 5.1 g/dL Final     Comment:     @ Test Performed By:  [facility] Patterson M.D.,   [address] [place] [id number]  CLIA #[id number]     Total Bilirubin   Date Value Ref Range Status   10/16/2019 0.8 0.1 - 1.0 mg/dL Final     Comment:     For infants and newborns, interpretation of results should be based  on gestational age, weight and in agreement with clinical  observations.  Premature Infant  recommended reference ranges:  Up to 24 hours.............<8.0 mg/dL  Up to 48 hours............<12.0 mg/dL  3-5 days..................<15.0 mg/dL  6-29 days.................<15.0 mg/dL       Alkaline Phosphatase   Date Value Ref Range Status   10/16/2019 82 55 - 135 U/L Final     AST   Date Value Ref Range Status   10/16/2019 21 10 - 40 U/L Final     ALT   Date Value Ref Range Status   10/16/2019 16 10 - 44 U/L Final     Anion Gap   Date Value Ref Range Status   10/16/2019 11 8 - 16 mmol/L Final     eGFR if    Date Value Ref Range Status   10/16/2019 >60.0 >60 mL/min/1.73 m^2 Final     eGFR if non    Date Value Ref Range Status   10/16/2019 58.9 (A) >60 mL/min/1.73 m^2 Final     Comment:     Calculation used to obtain the estimated glomerular filtration  rate (eGFR) is the CKD-EPI equation.          ASSESSMENT AND PLAN:   Encounter Diagnoses   Name Primary?    CNS vasculitis failed imuran; on cytoxan Yes    Chemotherapy-induced thrombocytopenia     Anemia associated with chemotherapy        CNS vasculitis  -continue CTX per neuro recs  -proceed with dose #28 Cytoxan was decreased to 600 mg/m2 (due to CrCl) with mesna support for CNS vasculitis, protocol per Dr. Love; improved symptoms since initiation; per son at visit today symptoms have reached a plateau; pt with fatigue and labile affect most days    -will continue per Neurology, failed Cytoxan hiatus and transition to Imuran Sept/Oct 2018. Restarted cytoxan 1/23/2018. Now much improved back on Cytoxan.  -last saw Neurology 7/23/19  -no contraindication to proceed with CTX today   -in light of concern for long term complications from prolonged alkylator exposure have discussed case with Dr. Love who recommended referral to rheumatology for possible non cytotoxic approaches; appt scheduled for nov 2019; unable to move appt to sooner, so he will keep it for 11/4/19. Thus, we will proceed with next cycle as planned today. Will  schedule labs in 2 weeks and a tentative appt 4 weeks from now for another cycle. Have told pt and his spouse to cancel if rheum changes therapy.    Cytopenias- Anemia/thrombocytopenia  -mild anemia and thromboctyopenia 2/2 chemotherapy,   -transfuse for Hgb<7 and Plt <10k, no indication to transfuse today    -fu with pcp for chronic medical conditions     F/U:  -cbc, cmp, T+S at [facility] in 2 weeks  -same labs, appt with Dr. Goldstein and cytoxan infusion in 4 weeks; may cancel if transitions to non chemotherapy with rheum/neuro      Kalyn Quarles, JOE, NP  Hematology/Oncology

## 2019-10-16 ENCOUNTER — OFFICE VISIT (OUTPATIENT)
Dept: HEMATOLOGY/ONCOLOGY | Facility: CLINIC | Age: 59
End: 2019-10-16
Payer: MEDICARE

## 2019-10-16 ENCOUNTER — INFUSION (OUTPATIENT)
Dept: INFUSION THERAPY | Facility: HOSPITAL | Age: 59
End: 2019-10-16
Attending: INTERNAL MEDICINE
Payer: MEDICARE

## 2019-10-16 VITALS
DIASTOLIC BLOOD PRESSURE: 100 MMHG | DIASTOLIC BLOOD PRESSURE: 85 MMHG | BODY MASS INDEX: 30.09 KG/M2 | HEART RATE: 106 BPM | TEMPERATURE: 99 F | TEMPERATURE: 99 F | RESPIRATION RATE: 18 BRPM | HEART RATE: 105 BPM | WEIGHT: 214.94 LBS | HEIGHT: 71 IN | OXYGEN SATURATION: 97 % | SYSTOLIC BLOOD PRESSURE: 157 MMHG | SYSTOLIC BLOOD PRESSURE: 163 MMHG

## 2019-10-16 DIAGNOSIS — D69.59 CHEMOTHERAPY-INDUCED THROMBOCYTOPENIA: ICD-10-CM

## 2019-10-16 DIAGNOSIS — I77.6 CNS VASCULITIS: Primary | ICD-10-CM

## 2019-10-16 DIAGNOSIS — T45.1X5A CHEMOTHERAPY-INDUCED THROMBOCYTOPENIA: ICD-10-CM

## 2019-10-16 DIAGNOSIS — T45.1X5A ANEMIA ASSOCIATED WITH CHEMOTHERAPY: ICD-10-CM

## 2019-10-16 DIAGNOSIS — D64.81 ANEMIA ASSOCIATED WITH CHEMOTHERAPY: ICD-10-CM

## 2019-10-16 PROCEDURE — 3077F SYST BP >= 140 MM HG: CPT | Mod: CPTII,S$GLB,, | Performed by: NURSE PRACTITIONER

## 2019-10-16 PROCEDURE — 3078F DIAST BP <80 MM HG: CPT | Mod: CPTII,S$GLB,, | Performed by: NURSE PRACTITIONER

## 2019-10-16 PROCEDURE — 99999 PR PBB SHADOW E&M-EST. PATIENT-LVL V: CPT | Mod: PBBFAC,,, | Performed by: NURSE PRACTITIONER

## 2019-10-16 PROCEDURE — 99999 PR PBB SHADOW E&M-EST. PATIENT-LVL V: ICD-10-PCS | Mod: PBBFAC,,, | Performed by: NURSE PRACTITIONER

## 2019-10-16 PROCEDURE — 96367 TX/PROPH/DG ADDL SEQ IV INF: CPT

## 2019-10-16 PROCEDURE — 3008F BODY MASS INDEX DOCD: CPT | Mod: CPTII,S$GLB,, | Performed by: NURSE PRACTITIONER

## 2019-10-16 PROCEDURE — 99215 OFFICE O/P EST HI 40 MIN: CPT | Mod: S$GLB,,, | Performed by: NURSE PRACTITIONER

## 2019-10-16 PROCEDURE — 99499 RISK ADDL DX/OHS AUDIT: ICD-10-PCS | Mod: S$GLB,,, | Performed by: NURSE PRACTITIONER

## 2019-10-16 PROCEDURE — 99499 UNLISTED E&M SERVICE: CPT | Mod: S$GLB,,, | Performed by: NURSE PRACTITIONER

## 2019-10-16 PROCEDURE — 99215 PR OFFICE/OUTPT VISIT, EST, LEVL V, 40-54 MIN: ICD-10-PCS | Mod: S$GLB,,, | Performed by: NURSE PRACTITIONER

## 2019-10-16 PROCEDURE — 96411 CHEMO IV PUSH ADDL DRUG: CPT

## 2019-10-16 PROCEDURE — 3008F PR BODY MASS INDEX (BMI) DOCUMENTED: ICD-10-PCS | Mod: CPTII,S$GLB,, | Performed by: NURSE PRACTITIONER

## 2019-10-16 PROCEDURE — 3078F PR MOST RECENT DIASTOLIC BLOOD PRESSURE < 80 MM HG: ICD-10-PCS | Mod: CPTII,S$GLB,, | Performed by: NURSE PRACTITIONER

## 2019-10-16 PROCEDURE — 96413 CHEMO IV INFUSION 1 HR: CPT

## 2019-10-16 PROCEDURE — 63600175 PHARM REV CODE 636 W HCPCS: Mod: JG | Performed by: INTERNAL MEDICINE

## 2019-10-16 PROCEDURE — 3077F PR MOST RECENT SYSTOLIC BLOOD PRESSURE >= 140 MM HG: ICD-10-PCS | Mod: CPTII,S$GLB,, | Performed by: NURSE PRACTITIONER

## 2019-10-16 RX ORDER — HEPARIN 100 UNIT/ML
500 SYRINGE INTRAVENOUS
Status: DISCONTINUED | OUTPATIENT
Start: 2019-10-16 | End: 2019-10-16 | Stop reason: HOSPADM

## 2019-10-16 RX ORDER — SODIUM CHLORIDE 0.9 % (FLUSH) 0.9 %
10 SYRINGE (ML) INJECTION
Status: DISCONTINUED | OUTPATIENT
Start: 2019-10-16 | End: 2019-10-16 | Stop reason: HOSPADM

## 2019-10-16 RX ADMIN — MESNA 1500 MG: 100 INJECTION, SOLUTION INTRAVENOUS at 12:10

## 2019-10-16 RX ADMIN — DEXAMETHASONE SODIUM PHOSPHATE: 4 INJECTION, SOLUTION INTRA-ARTICULAR; INTRALESIONAL; INTRAMUSCULAR; INTRAVENOUS; SOFT TISSUE at 12:10

## 2019-10-16 RX ADMIN — CYCLOPHOSPHAMIDE 1500 MG: 1 INJECTION, POWDER, FOR SOLUTION INTRAVENOUS; ORAL at 01:10

## 2019-10-16 RX ADMIN — HEPARIN SODIUM (PORCINE) LOCK FLUSH IV SOLN 100 UNIT/ML 500 UNITS: 100 SOLUTION at 02:10

## 2019-10-16 NOTE — Clinical Note
F/U:-cbc, cmp, T+S at [facility] in 2 weeks-same labs, appt with Dr. Goldstein and cytoxan infusion in 4 weeks; may cancel if transitions to non chemotherapy with rheum/neuro

## 2019-10-16 NOTE — PLAN OF CARE
Patient tolerated infusion with no complications. VSS. NAD. Pt instructed to call MD with any problems. Pt discharged home independently.

## 2019-10-18 NOTE — PLAN OF CARE
Discharge planning: Patient's spouse was hoping for inpatient rehab but patient does not qualify based on  evaluation by PT/OT. Plan for d/c home with outpatient services PT/OT/.   No respiratory distress. No stridor, Lungs sounds clear with good aeration bilaterally.

## 2019-10-22 NOTE — PROGRESS NOTES
CT angio brain and neck negative. Chief Complaint   Patient presents with     RECHECK     Psoriatic arthritis; Polyarthropathy         /71 (BP Location: Right arm, Patient Position: Sitting, Cuff Size: Adult Regular)   Pulse 73   Temp 97.9  F (36.6  C) (Oral)   Wt 75.4 kg (166 lb 4.8 oz)   LMP 03/06/2012   SpO2 98%   BMI 27.25 kg/m        Silvia Aguilar CMA CMA    10/22/2019 1:41 PM

## 2019-10-29 DIAGNOSIS — I10 ESSENTIAL HYPERTENSION: Chronic | ICD-10-CM

## 2019-10-29 RX ORDER — IRBESARTAN 300 MG/1
TABLET ORAL
Qty: 90 TABLET | Refills: 3 | Status: SHIPPED | OUTPATIENT
Start: 2019-10-29 | End: 2020-08-05 | Stop reason: SDUPTHER

## 2019-10-30 ENCOUNTER — PATIENT OUTREACH (OUTPATIENT)
Dept: ADMINISTRATIVE | Facility: OTHER | Age: 59
End: 2019-10-30

## 2019-11-04 ENCOUNTER — OFFICE VISIT (OUTPATIENT)
Dept: RHEUMATOLOGY | Facility: CLINIC | Age: 59
End: 2019-11-04
Payer: MEDICARE

## 2019-11-04 VITALS
SYSTOLIC BLOOD PRESSURE: 151 MMHG | WEIGHT: 222.69 LBS | DIASTOLIC BLOOD PRESSURE: 94 MMHG | HEIGHT: 70 IN | HEART RATE: 76 BPM | BODY MASS INDEX: 31.88 KG/M2

## 2019-11-04 DIAGNOSIS — I77.6 CNS VASCULITIS: Primary | ICD-10-CM

## 2019-11-04 DIAGNOSIS — F43.9 STRESS: ICD-10-CM

## 2019-11-04 DIAGNOSIS — D84.9 IMMUNOSUPPRESSION: ICD-10-CM

## 2019-11-04 PROCEDURE — 99214 OFFICE O/P EST MOD 30 MIN: CPT | Mod: S$GLB,,, | Performed by: INTERNAL MEDICINE

## 2019-11-04 PROCEDURE — 3080F DIAST BP >= 90 MM HG: CPT | Mod: CPTII,S$GLB,, | Performed by: INTERNAL MEDICINE

## 2019-11-04 PROCEDURE — 3080F PR MOST RECENT DIASTOLIC BLOOD PRESSURE >= 90 MM HG: ICD-10-PCS | Mod: CPTII,S$GLB,, | Performed by: INTERNAL MEDICINE

## 2019-11-04 PROCEDURE — 3008F PR BODY MASS INDEX (BMI) DOCUMENTED: ICD-10-PCS | Mod: CPTII,S$GLB,, | Performed by: INTERNAL MEDICINE

## 2019-11-04 PROCEDURE — 3008F BODY MASS INDEX DOCD: CPT | Mod: CPTII,S$GLB,, | Performed by: INTERNAL MEDICINE

## 2019-11-04 PROCEDURE — 99999 PR PBB SHADOW E&M-EST. PATIENT-LVL V: CPT | Mod: PBBFAC,,, | Performed by: INTERNAL MEDICINE

## 2019-11-04 PROCEDURE — 3077F PR MOST RECENT SYSTOLIC BLOOD PRESSURE >= 140 MM HG: ICD-10-PCS | Mod: CPTII,S$GLB,, | Performed by: INTERNAL MEDICINE

## 2019-11-04 PROCEDURE — 99999 PR PBB SHADOW E&M-EST. PATIENT-LVL V: ICD-10-PCS | Mod: PBBFAC,,, | Performed by: INTERNAL MEDICINE

## 2019-11-04 PROCEDURE — 99214 PR OFFICE/OUTPT VISIT, EST, LEVL IV, 30-39 MIN: ICD-10-PCS | Mod: S$GLB,,, | Performed by: INTERNAL MEDICINE

## 2019-11-04 PROCEDURE — 3077F SYST BP >= 140 MM HG: CPT | Mod: CPTII,S$GLB,, | Performed by: INTERNAL MEDICINE

## 2019-11-04 ASSESSMENT — ROUTINE ASSESSMENT OF PATIENT INDEX DATA (RAPID3)
PSYCHOLOGICAL DISTRESS SCORE: 5.5
AM STIFFNESS SCORE: 1, YES
MDHAQ FUNCTION SCORE: 2
PATIENT GLOBAL ASSESSMENT SCORE: 9
FATIGUE SCORE: 9.5
TOTAL RAPID3 SCORE: 7.72
WHEN YOU AWAKENED IN THE MORNING OVER THE LAST WEEK, PLEASE INDICATE THE AMOUNT OF TIME IT TAKES UNTIL YOU ARE AS LIMBER AS YOU WILL BE FOR THE DAY: 4 HOURS
PAIN SCORE: 7.5

## 2019-11-04 NOTE — Clinical Note
Thanks for the referral.  Mr. Nunez is still having symptoms despite Cytoxan.  Do you think he should be tried on Rituxan?  I fear switching him to a weaker immunosuppressant like Cellcept may result in more significant flares.  He has already tried azathioprine per the chart.

## 2019-11-04 NOTE — PROGRESS NOTES
Subjective:       Patient ID: Bessy Nunez is a 59 y.o. male.    Chief Complaint: Pain (3/10 ); Disease Management (headache and ghout); and Swelling (pt states his ankles swell 2x/week )      HPI:  Besys Nunez is a 59 y.o. 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  Treated with Cytoxan monthly since 2017.  After Cytoxan his head is cleared for 2-3 weeks and he falls left.    Dr. Urias is concerned about toxicity of chemotherapy.    Ritalin recently started by Dr. Love has helped to clear his brain up.      Due for Cytoxan 11/13/19 and wife feels that he is having more activity than usual now based on shuffling gait, almost fell and did not put his hands up, he has been hiding his medicine.      Ankles swell if he sits up a great deal.        Note per heme/onc NP Robina:  Mr. Nunez presents to clinic today with his wife for Cytoxan infusion for CNS Vasculitis. This will be dose #28. Cytoxan was stopped in 2018, and neurologic symptoms worsened so cytoxan was resumed. Since resuming Cytoxan, neurologic symptoms have improved. He notes profound chronic fatigue, but states energy levels improved right after receiving cytoxan. Other than moderate cytopenais mid cycles, pt is tolerating this well. Has upcoming rheumatology appt to discuss alternative therapies in light of ongoing concerns to prolonged high dose alkylator exposure. This appt is scheduled for 11/4/19, so he is planned to proceed with next cycle today. He missed his lab appt yesterday due to fatigue, so he was scheduled for labs today instead. Will will proceed with therapy today and schedule him for labs in 2 weeks and another tentative appt in 4 weeks for another cycle. Have informed pt and his spouse to cancel if rheum changes his therapy.    Review of Systems   Constitutional: Positive for fatigue.   Cardiovascular: Positive for chest pain (intermittent episode;  may have actually been the neck.).   Gastrointestinal:  "Positive for diarrhea.   Musculoskeletal: Positive for back pain and neck pain.   Neurological: Positive for headaches.         Objective:   BP (!) 151/94 (BP Location: Left arm, Patient Position: Sitting, BP Method: Medium (Automatic))   Pulse 76   Ht 5' 10" (1.778 m)   Wt 101 kg (222 lb 10.6 oz)   BMI 31.95 kg/m²      Physical Exam   Constitutional: He is oriented to person, place, and time and well-developed, well-nourished, and in no distress.   HENT:   Head: Normocephalic and atraumatic.   Eyes: Conjunctivae and EOM are normal.   Neck: Neck supple.   Cardiovascular: Normal rate, regular rhythm and normal heart sounds.    Pulmonary/Chest: Effort normal and breath sounds normal.   Abdominal: Soft. Bowel sounds are normal.   Neurological: He is alert and oriented to person, place, and time. Gait normal.   Skin: Skin is warm and dry.     Psychiatric: Mood and affect normal.   Musculoskeletal: He exhibits no edema, tenderness or deformity.              LABS    Component      Latest Ref Rng & Units 10/16/2019   WBC      3.90 - 12.70 K/uL 7.14   RBC      4.60 - 6.20 M/uL 3.96 (L)   Hemoglobin      14.0 - 18.0 g/dL 11.3 (L)   Hematocrit      40.0 - 54.0 % 33.9 (L)   MCV      82 - 98 fL 86   MCH      27.0 - 31.0 pg 28.5   MCHC      32.0 - 36.0 g/dL 33.3   RDW      11.5 - 14.5 % 15.1 (H)   Platelets      150 - 350 K/uL 140 (L)   MPV      9.2 - 12.9 fL 9.3   Immature Granulocytes      0.0 - 0.5 % 0.4   Gran # (ANC)      1.8 - 7.7 K/uL 4.9   Immature Grans (Abs)      0.00 - 0.04 K/uL 0.03   Lymph #      1.0 - 4.8 K/uL 1.3   Mono #      0.3 - 1.0 K/uL 0.7   Eos #      0.0 - 0.5 K/uL 0.1   Baso #      0.00 - 0.20 K/uL 0.03   nRBC      0 /100 WBC 0   Gran%      38.0 - 73.0 % 68.8   Lymph%      18.0 - 48.0 % 18.3   Mono%      4.0 - 15.0 % 10.4   Eosinophil%      0.0 - 8.0 % 1.7   Basophil%      0.0 - 1.9 % 0.4   Differential Method       Automated   Sodium      136 - 145 mmol/L 146 (H)   Potassium      3.5 - 5.1 mmol/L 4.3 "   Chloride      95 - 110 mmol/L 109   CO2      23 - 29 mmol/L 26   Glucose      70 - 110 mg/dL 145 (H)   BUN, Bld      8 - 23 mg/dL 13   Creatinine      0.5 - 1.4 mg/dL 1.3   Calcium      8.7 - 10.5 mg/dL 9.9   PROTEIN TOTAL      6.0 - 8.4 g/dL 7.5   Albumin      3.5 - 5.2 g/dL 4.2   BILIRUBIN TOTAL      0.1 - 1.0 mg/dL 0.8   Alkaline Phosphatase      55 - 135 U/L 82   AST      10 - 40 U/L 21   ALT      10 - 44 U/L 16   Anion Gap      8 - 16 mmol/L 11   eGFR if African American      >60 mL/min/1.73 m:2 >60.0   eGFR if non African American      >60 mL/min/1.73 m:2 58.9 (A)   Group & Rh       O POS   INDIRECT ALBERTO       NEG     Assessment:       1.  CNS vasculitis  2.  Immunosupprsession.  TPMT normal metabolizer in 8/2018 but failed per chart.    3.  Diabetes  4.  Compliance.  He hides his medicine some times  Plan:       1.  Labs  2.  Handout on Cellcept provided; Message to Dr. Love regarding Rituxan vs Cellcept.    3.  Had Flu vaccination  4.  Refer psychology to deal with stress of disease.      RTO 2 months/prn

## 2019-11-07 ENCOUNTER — TELEPHONE (OUTPATIENT)
Dept: RHEUMATOLOGY | Facility: CLINIC | Age: 59
End: 2019-11-07

## 2019-11-08 NOTE — TELEPHONE ENCOUNTER
----- Message from Shana Love MD sent at 11/7/2019  5:24 PM CST -----  Perhaps, but if it does not work then we'd be limited--unless we are okay combing them.  We've tried him on oral agent in past (I think it was imuran) and he relapsed immediately, so you are correct.     Is your sense that he is worsening in face of Cytoxan?   ----- Message -----  From: Kay Troy MD  Sent: 11/4/2019  12:15 PM CST  To: Shana Love MD    Thanks for the referral.  Mr. Nunez is still having symptoms despite Cytoxan.  Do you think he should be tried on Rituxan?  I fear switching him to a weaker immunosuppressant like Cellcept may result in more significant flares.  He has already tried azathioprine per the chart.

## 2019-11-13 ENCOUNTER — TELEPHONE (OUTPATIENT)
Dept: NEUROLOGY | Facility: CLINIC | Age: 59
End: 2019-11-13

## 2019-11-13 ENCOUNTER — LAB VISIT (OUTPATIENT)
Dept: LAB | Facility: HOSPITAL | Age: 59
End: 2019-11-13
Payer: MEDICARE

## 2019-11-13 ENCOUNTER — OFFICE VISIT (OUTPATIENT)
Dept: HEMATOLOGY/ONCOLOGY | Facility: CLINIC | Age: 59
End: 2019-11-13
Payer: MEDICARE

## 2019-11-13 VITALS
TEMPERATURE: 98 F | OXYGEN SATURATION: 99 % | HEIGHT: 70 IN | DIASTOLIC BLOOD PRESSURE: 96 MMHG | RESPIRATION RATE: 16 BRPM | HEART RATE: 82 BPM | SYSTOLIC BLOOD PRESSURE: 164 MMHG | BODY MASS INDEX: 32.13 KG/M2 | WEIGHT: 224.44 LBS

## 2019-11-13 DIAGNOSIS — D70.1 CHEMOTHERAPY-INDUCED NEUTROPENIA: ICD-10-CM

## 2019-11-13 DIAGNOSIS — I77.6 CNS VASCULITIS: Primary | ICD-10-CM

## 2019-11-13 DIAGNOSIS — I77.6 CNS VASCULITIS: ICD-10-CM

## 2019-11-13 DIAGNOSIS — D84.9 IMMUNOSUPPRESSION: ICD-10-CM

## 2019-11-13 DIAGNOSIS — T45.1X5A CHEMOTHERAPY-INDUCED NEUTROPENIA: ICD-10-CM

## 2019-11-13 LAB
ABO + RH BLD: NORMAL
ALBUMIN SERPL BCP-MCNC: 3.8 G/DL (ref 3.5–5.2)
ALP SERPL-CCNC: 78 U/L (ref 55–135)
ALT SERPL W/O P-5'-P-CCNC: 17 U/L (ref 10–44)
ANION GAP SERPL CALC-SCNC: 9 MMOL/L (ref 8–16)
AST SERPL-CCNC: 21 U/L (ref 10–40)
BASOPHILS # BLD AUTO: 0.02 K/UL (ref 0–0.2)
BASOPHILS NFR BLD: 0.3 % (ref 0–1.9)
BILIRUB SERPL-MCNC: 0.8 MG/DL (ref 0.1–1)
BLD GP AB SCN CELLS X3 SERPL QL: NORMAL
BUN SERPL-MCNC: 17 MG/DL (ref 8–23)
CALCIUM SERPL-MCNC: 9.4 MG/DL (ref 8.7–10.5)
CHLORIDE SERPL-SCNC: 104 MMOL/L (ref 95–110)
CO2 SERPL-SCNC: 24 MMOL/L (ref 23–29)
CREAT SERPL-MCNC: 1 MG/DL (ref 0.5–1.4)
CRP SERPL-MCNC: 5 MG/L (ref 0–8.2)
DIFFERENTIAL METHOD: ABNORMAL
EOSINOPHIL # BLD AUTO: 0.1 K/UL (ref 0–0.5)
EOSINOPHIL NFR BLD: 1.7 % (ref 0–8)
ERYTHROCYTE [DISTWIDTH] IN BLOOD BY AUTOMATED COUNT: 15.1 % (ref 11.5–14.5)
ERYTHROCYTE [SEDIMENTATION RATE] IN BLOOD BY WESTERGREN METHOD: 21 MM/HR (ref 0–23)
EST. GFR  (AFRICAN AMERICAN): >60 ML/MIN/1.73 M^2
EST. GFR  (NON AFRICAN AMERICAN): >60 ML/MIN/1.73 M^2
GLUCOSE SERPL-MCNC: 266 MG/DL (ref 70–110)
HCT VFR BLD AUTO: 34.4 % (ref 40–54)
HGB BLD-MCNC: 11.5 G/DL (ref 14–18)
IMM GRANULOCYTES # BLD AUTO: 0.04 K/UL (ref 0–0.04)
IMM GRANULOCYTES NFR BLD AUTO: 0.7 % (ref 0–0.5)
LYMPHOCYTES # BLD AUTO: 0.9 K/UL (ref 1–4.8)
LYMPHOCYTES NFR BLD: 15.1 % (ref 18–48)
MCH RBC QN AUTO: 29.3 PG (ref 27–31)
MCHC RBC AUTO-ENTMCNC: 33.4 G/DL (ref 32–36)
MCV RBC AUTO: 88 FL (ref 82–98)
MONOCYTES # BLD AUTO: 0.5 K/UL (ref 0.3–1)
MONOCYTES NFR BLD: 8.5 % (ref 4–15)
NEUTROPHILS # BLD AUTO: 4.2 K/UL (ref 1.8–7.7)
NEUTROPHILS NFR BLD: 73.7 % (ref 38–73)
NRBC BLD-RTO: 0 /100 WBC
PLATELET # BLD AUTO: 102 K/UL (ref 150–350)
PMV BLD AUTO: 9.6 FL (ref 9.2–12.9)
POTASSIUM SERPL-SCNC: 3.9 MMOL/L (ref 3.5–5.1)
PROT SERPL-MCNC: 7 G/DL (ref 6–8.4)
RBC # BLD AUTO: 3.92 M/UL (ref 4.6–6.2)
SODIUM SERPL-SCNC: 137 MMOL/L (ref 136–145)
WBC # BLD AUTO: 5.75 K/UL (ref 3.9–12.7)

## 2019-11-13 PROCEDURE — 99999 PR PBB SHADOW E&M-EST. PATIENT-LVL IV: CPT | Mod: PBBFAC,,, | Performed by: INTERNAL MEDICINE

## 2019-11-13 PROCEDURE — 85025 COMPLETE CBC W/AUTO DIFF WBC: CPT

## 2019-11-13 PROCEDURE — 86850 RBC ANTIBODY SCREEN: CPT

## 2019-11-13 PROCEDURE — 3080F DIAST BP >= 90 MM HG: CPT | Mod: CPTII,S$GLB,, | Performed by: INTERNAL MEDICINE

## 2019-11-13 PROCEDURE — 3008F BODY MASS INDEX DOCD: CPT | Mod: CPTII,S$GLB,, | Performed by: INTERNAL MEDICINE

## 2019-11-13 PROCEDURE — 80053 COMPREHEN METABOLIC PANEL: CPT

## 2019-11-13 PROCEDURE — 36415 COLL VENOUS BLD VENIPUNCTURE: CPT

## 2019-11-13 PROCEDURE — 99214 PR OFFICE/OUTPT VISIT, EST, LEVL IV, 30-39 MIN: ICD-10-PCS | Mod: S$GLB,,, | Performed by: INTERNAL MEDICINE

## 2019-11-13 PROCEDURE — 3080F PR MOST RECENT DIASTOLIC BLOOD PRESSURE >= 90 MM HG: ICD-10-PCS | Mod: CPTII,S$GLB,, | Performed by: INTERNAL MEDICINE

## 2019-11-13 PROCEDURE — 99499 UNLISTED E&M SERVICE: CPT | Mod: S$GLB,,, | Performed by: INTERNAL MEDICINE

## 2019-11-13 PROCEDURE — 99999 PR PBB SHADOW E&M-EST. PATIENT-LVL IV: ICD-10-PCS | Mod: PBBFAC,,, | Performed by: INTERNAL MEDICINE

## 2019-11-13 PROCEDURE — 3077F PR MOST RECENT SYSTOLIC BLOOD PRESSURE >= 140 MM HG: ICD-10-PCS | Mod: CPTII,S$GLB,, | Performed by: INTERNAL MEDICINE

## 2019-11-13 PROCEDURE — 86704 HEP B CORE ANTIBODY TOTAL: CPT

## 2019-11-13 PROCEDURE — 85652 RBC SED RATE AUTOMATED: CPT

## 2019-11-13 PROCEDURE — 86140 C-REACTIVE PROTEIN: CPT

## 2019-11-13 PROCEDURE — 99499 RISK ADDL DX/OHS AUDIT: ICD-10-PCS | Mod: S$GLB,,, | Performed by: INTERNAL MEDICINE

## 2019-11-13 PROCEDURE — 3008F PR BODY MASS INDEX (BMI) DOCUMENTED: ICD-10-PCS | Mod: CPTII,S$GLB,, | Performed by: INTERNAL MEDICINE

## 2019-11-13 PROCEDURE — 99214 OFFICE O/P EST MOD 30 MIN: CPT | Mod: S$GLB,,, | Performed by: INTERNAL MEDICINE

## 2019-11-13 PROCEDURE — 3077F SYST BP >= 140 MM HG: CPT | Mod: CPTII,S$GLB,, | Performed by: INTERNAL MEDICINE

## 2019-11-13 NOTE — PROGRESS NOTES
SECTION OF HEMATOLOGY AND BONE MARROW TRANSPLANT    11/14/2019  Referred by:  Dr. Love  Referred for: Cytoxan    HISTORY OF PRESENT ILLNESS:   Mr. Nunez presents to clinic today with his wife for Cytoxan infusion for CNS Vasculitis. This will be dose #29. Cytoxan was stopped in 2018, and neurologic symptoms worsened so cytoxan was resumed. Since resuming Cytoxan, neurologic symptoms have improved however duration of benefit much briefer now per wife, only 1-2 week.  Denies fever, chills, nightsweats, bleeding, brusing, lymphadenopathy, signs/symptoms of splenomegaly.        PAST MEDICAL HISTORY:   Past Medical History:   Diagnosis Date    Amblyopia     Cataract     CNS vasculitis 6/2/2017    Follows with Dr. Love By mri.  Several active lesions, many old. 5/13: MRA brain/neck, MRI brain w/wo contrast show no vascular occlusion, multiple foci of hyperintensity in deep cerebral periventricular white matter in pattern of demyelinating process.  5/17: MRI spine performed, no spinal cord lesions. Hospitalization 6/2017. EEG was performed negative for seizures.  Repeat MRI showing new lesion, question whether this was demyelination versus CVA. 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 Findings compatible with a small acute lacunar infarct adjacent to the left frontal horn.  Nonspecific enhancement just inferior to this region and extending into the left basal ganglia, as well as within the inferior aspect of the left cerebellum.  No evidence of a focal mass. 2.  Extensive increased signal intensity involving the periventricular white matter compatible with demyelinating disease versus vasculitis. 3.  Clinical correlation and followup recommended. 4.  This reportedly flattened in the EPIC and the  corpus callosum medical record system.    Mixed hyperlipidemia 11/9/2012    NAFLD (nonalcoholic fatty liver disease) 10/11/2013    CHASE (nonalcoholic steatohepatitis)     Obesity     Stroke     Type 2 diabetes mellitus with diabetic polyneuropathy, with long-term current use of insulin 5/14/2017    Type 2 diabetes mellitus with hyperglycemia, with long-term current use of insulin 10/11/2013       PAST SURGICAL HISTORY:   Past Surgical History:   Procedure Laterality Date    COLONOSCOPY N/A 5/21/2019    Procedure: COLONOSCOPY;  Surgeon: Alex Ascencio MD;  Location: [facility];  Service: Endoscopy;  Laterality: N/A;  confirmed appt-sp    INSERTION OF TUNNELED CENTRAL VENOUS CATHETER (CVC) WITH SUBCUTANEOUS PORT N/A 12/7/2018    Procedure: [id number];  Surgeon: Luan Diagnostic Provider;  Location: [facility] OR [address] [place] [address];  Service: Radiology;  Laterality: N/A;    SKIN CANCER EXCISION         PAST SOCIAL HISTORY:   reports that he has never smoked. He has never used smokeless tobacco. He reports that he does not drink alcohol or use drugs.    FAMILY HISTORY:  Family History   Problem Relation Age of Onset    Heart disease Mother     Hypertension Mother     Heart failure Mother     Cataracts Mother     Cancer Father         lung    Diabetes Maternal Aunt     Stroke Sister     Rheum arthritis Paternal Grandmother     Amblyopia Neg Hx     Blindness Neg Hx     Glaucoma Neg Hx     Macular degeneration Neg Hx     Retinal detachment Neg Hx     Strabismus Neg Hx        CURRENT MEDICATIONS:   Current Outpatient Medications   Medication Sig    alclomethasone (ACLOVATE) 0.05 % cream     alendronate (FOSAMAX) 70 MG tablet Take 1 tablet (70 mg total) by mouth every 7 days.    aspirin (ECOTRIN) 81 MG EC tablet Take 81 mg by mouth once daily.    atenolol (TENORMIN) 50 MG tablet Take 1 tablet (50 mg total) by mouth once daily.    atorvastatin (LIPITOR) 40 MG tablet TAKE 1 TABLET BY MOUTH ONCE DAILY     "blood sugar diagnostic (TRUE METRIX GLUCOSE TEST STRIP) Strp Test blood sugar four times a day    DILT- mg CDCR TAKE 1 CAPSULE BY MOUTH ONCE DAILY    donepezil (ARICEPT) 5 MG tablet Take 1 tablet (5 mg total) by mouth every evening. Start with 1/2 tab daily x 1 week then whole tablet maintenance    flash glucose sensor (FREESTYLE ARELY 14 DAY SENSOR) Kit Glucose checking 4 times a day    FREESTYLE ARELY 14 DAY READER Misc GLUCOSE TESTING : FASTING AND PRIOR TO MEALS    insulin (BASAGLAR KWIKPEN U-100 INSULIN) glargine 100 units/mL (3mL) SubQ pen Inject 40 Units into the skin 2 (two) times daily. Up to 60 BID with cytoxan    insulin syringe-needle U-100 (INSULIN SYRINGE) 1/2 mL 30 gauge x 5/16 Syrg Use 3x/day    irbesartan (AVAPRO) 300 MG tablet TAKE 1 TABLET BY MOUTH ONCE DAILY IN THE EVENING    ketoconazole (NIZORAL) 2 % cream     lancets 30 gauge Misc Test blood sugar four times a day    liraglutide 0.6 mg/0.1 mL, 18 mg/3 mL, subq PNIJ (VICTOZA 3-TOMASZ) 0.6 mg/0.1 mL (18 mg/3 mL) PnIj Inject 1.8 mg into the skin once daily.    metFORMIN (GLUCOPHAGE-XR) 500 MG 24 hr tablet Take 2 tablets (1,000 mg total) by mouth 2 (two) times daily with meals.    methylphenidate HCl (RITALIN) 10 MG tablet Take 1 tablet (10 mg total) by mouth 2 (two) times daily with meals.    omega-3 fatty acids-vitamin E (FISH OIL) 1,000 mg Cap Take 1 capsule by mouth 2 (two) times daily.    pen needle, diabetic (BD ULTRA-FINE ANA PEN NEEDLE) 32 gauge x 5/32" Ndle 4x daily insulin pen needle, relion    QUEtiapine (SEROQUEL) 25 MG Tab Take 1 tablet (25 mg total) by mouth every evening.    sertraline (ZOLOFT) 100 MG tablet TAKE 1 & 1/2 (ONE & ONE-HALF) TABLETS BY MOUTH ONCE DAILY    VICTOZA 2-TOMASZ 0.6 mg/0.1 mL (18 mg/3 mL) PnIj INJECT 1.8 MG INTO THE SKIN ONCE DAILY    vitamin D 1000 units Tab Take 5 tablets (5,000 Units total) by mouth once daily.    insulin aspart U-100 (NOVOLOG) 100 unit/mL InPn pen Inject 20 Units into " the skin 3 (three) times daily with meals. (Patient taking differently: Inject 20 Units into the skin 4 (four) times daily. )     No current facility-administered medications for this visit.      ALLERGIES:   Review of patient's allergies indicates:  No Known Allergies    REVIEW OF SYSTEMS:   General ROS: Profound fatigue  Psychological ROS: Confusion much improved.   Ophthalmic ROS: negative  ENT ROS: negative  Allergy and Immunology ROS: negative  Hematological and Lymphatic ROS: negative  Endocrine ROS: negative  Respiratory ROS: negative  Cardiovascular ROS: negative  Gastrointestinal ROS: negative  Genito-Urinary ROS: negative  Musculoskeletal ROS: negative  Neurological ROS: see HPI  Dermatological ROS: negative    PHYSICAL EXAM:   Vitals:    11/13/19 1550   BP: (!) 164/96   Pulse: 82   Resp: 16   Temp: 98.1 °F (36.7 °C)       General - well developed, well nourished; much more interactive today. A+Ox3 today.  Head & Face - no sinus tenderness  Eyes - normal conjunctivae and lids   ENT - no mouth sores  Chest and Lung - normal respiratory effort, clear to auscultation bilaterally   Cardiovascular - RRR with no MGR, normal S1 and S2; no pedal edema  Abdomen -  soft, nontender, no palpable hepatomegaly or splenomegaly  Extremities - unremarkable nails and digits  Heme - no bruising, petechiae, pallor  Skin - no rashes or lesions   Psych - Normal mood and affect. Intermittent confusion noted     ECOG Performance Status: (foot note - ECOG PS provided by Eastern Cooperative Oncology Group) 1 - Symptomatic but completely ambulatory    Karnofsky Performance Score:  80%- Normal Activity with Effort: Some Symptoms of Disease  DATA:   Lab Results   Component Value Date    WBC 5.75 11/13/2019    HGB 11.5 (L) 11/13/2019    HCT 34.4 (L) 11/13/2019    MCV 88 11/13/2019     (L) 11/13/2019     Gran # (ANC)   Date Value Ref Range Status   11/13/2019 4.2 1.8 - 7.7 K/uL Final     Gran%   Date Value Ref Range Status    11/13/2019 73.7 (H) 38.0 - 73.0 % Final     CMP  Sodium   Date Value Ref Range Status   11/13/2019 137 136 - 145 mmol/L Final     Potassium   Date Value Ref Range Status   11/13/2019 3.9 3.5 - 5.1 mmol/L Final     Chloride   Date Value Ref Range Status   11/13/2019 104 95 - 110 mmol/L Final     CO2   Date Value Ref Range Status   11/13/2019 24 23 - 29 mmol/L Final     Glucose   Date Value Ref Range Status   11/13/2019 266 (H) 70 - 110 mg/dL Final     BUN, Bld   Date Value Ref Range Status   11/13/2019 17 8 - 23 mg/dL Final     Creatinine   Date Value Ref Range Status   11/13/2019 1.0 0.5 - 1.4 mg/dL Final     Calcium   Date Value Ref Range Status   11/13/2019 9.4 8.7 - 10.5 mg/dL Final     Total Protein   Date Value Ref Range Status   11/13/2019 7.0 6.0 - 8.4 g/dL Final     Albumin   Date Value Ref Range Status   11/13/2019 3.8 3.5 - 5.2 g/dL Final   10/11/2013 4.3 3.6 - 5.1 g/dL Final     Comment:     @ Test Performed By:  [employer] AMELIA Almazan M.D.,   [address] [place] [id number]  [facility] #[id number]     Total Bilirubin   Date Value Ref Range Status   11/13/2019 0.8 0.1 - 1.0 mg/dL Final     Comment:     For infants and newborns, interpretation of results should be based  on gestational age, weight and in agreement with clinical  observations.  Premature Infant recommended reference ranges:  Up to 24 hours.............<8.0 mg/dL  Up to 48 hours............<12.0 mg/dL  3-5 days..................<15.0 mg/dL  6-29 days.................<15.0 mg/dL       Alkaline Phosphatase   Date Value Ref Range Status   11/13/2019 78 55 - 135 U/L Final     AST   Date Value Ref Range Status   11/13/2019 21 10 - 40 U/L Final     ALT   Date Value Ref Range Status   11/13/2019 17 10 - 44 U/L Final     Anion Gap   Date Value Ref Range Status   11/13/2019 9 8 - 16 mmol/L Final     eGFR if    Date Value Ref Range Status   11/13/2019 >60.0 >60 mL/min/1.73  m^2 Final     eGFR if non    Date Value Ref Range Status   11/13/2019 >60.0 >60 mL/min/1.73 m^2 Final     Comment:     Calculation used to obtain the estimated glomerular filtration  rate (eGFR) is the CKD-EPI equation.          ASSESSMENT AND PLAN:   Encounter Diagnosis   Name Primary?    CNS vasculitis Yes       CNS vasculitis  -continue CTX per neuro recs  -sp 28 doses  -at this point given declining benefit and increasing risk of cardiotoxicity, bone marrow damage (MDS, AML) from alkylating agent would prefer other modality of IST, however if none are available and pt and wife accept risk I would be willing to proceed  -will discuss with Dr. Love (neuro) and Dr. Durant (rheum)  -do not proceed with ctx today     Cytopenias- Anemia/thrombocytopenia  -mild anemia and thromboctyopenia 2/2 chemotherapy, resolving  -transfuse for Hgb<7 and Plt <10k, no indication to transfuse today    -fu with pcp for chronic medical conditions     F/U:  -pending conversation with neuro and rheum

## 2019-11-13 NOTE — TELEPHONE ENCOUNTER
Spoke with pt's wife to confirm that he should go ahead with his CHEMO appt today, wife verbalized understanding.

## 2019-11-14 LAB — HBV CORE AB SERPL QL IA: NEGATIVE

## 2019-11-18 ENCOUNTER — PATIENT MESSAGE (OUTPATIENT)
Dept: RHEUMATOLOGY | Facility: CLINIC | Age: 59
End: 2019-11-18

## 2019-11-21 DIAGNOSIS — I10 ESSENTIAL HYPERTENSION: Chronic | ICD-10-CM

## 2019-11-21 RX ORDER — ATENOLOL 50 MG/1
TABLET ORAL
Qty: 90 TABLET | Refills: 3 | Status: SHIPPED | OUTPATIENT
Start: 2019-11-21 | End: 2019-12-19 | Stop reason: SDUPTHER

## 2019-11-29 ENCOUNTER — TELEPHONE (OUTPATIENT)
Dept: RHEUMATOLOGY | Facility: CLINIC | Age: 59
End: 2019-11-29

## 2019-12-03 ENCOUNTER — DOCUMENTATION ONLY (OUTPATIENT)
Dept: NEUROLOGY | Facility: CLINIC | Age: 59
End: 2019-12-03

## 2019-12-03 ENCOUNTER — PATIENT MESSAGE (OUTPATIENT)
Dept: RHEUMATOLOGY | Facility: CLINIC | Age: 59
End: 2019-12-03

## 2019-12-03 ENCOUNTER — OFFICE VISIT (OUTPATIENT)
Dept: NEUROLOGY | Facility: CLINIC | Age: 59
DRG: 871 | End: 2019-12-03
Payer: MEDICARE

## 2019-12-03 ENCOUNTER — HOSPITAL ENCOUNTER (INPATIENT)
Facility: HOSPITAL | Age: 59
LOS: 6 days | Discharge: NURSING, REHAB OR LONG-TERM CARE | DRG: 871 | End: 2019-12-09
Attending: EMERGENCY MEDICINE | Admitting: INTERNAL MEDICINE
Payer: MEDICARE

## 2019-12-03 VITALS
DIASTOLIC BLOOD PRESSURE: 85 MMHG | BODY MASS INDEX: 33.93 KG/M2 | SYSTOLIC BLOOD PRESSURE: 131 MMHG | WEIGHT: 237 LBS | HEART RATE: 114 BPM | HEIGHT: 70 IN

## 2019-12-03 DIAGNOSIS — R26.9 GAIT DISTURBANCE: ICD-10-CM

## 2019-12-03 DIAGNOSIS — N39.0 SEPSIS DUE TO URINARY TRACT INFECTION: Primary | ICD-10-CM

## 2019-12-03 DIAGNOSIS — I77.6 CNS VASCULITIS: Primary | ICD-10-CM

## 2019-12-03 DIAGNOSIS — Z71.89 COUNSELING REGARDING GOALS OF CARE: ICD-10-CM

## 2019-12-03 DIAGNOSIS — R50.9 FEVER: ICD-10-CM

## 2019-12-03 DIAGNOSIS — Z91.89 AT RISK FOR INFECTION DUE TO CHEMOTHERAPY: ICD-10-CM

## 2019-12-03 DIAGNOSIS — Z29.89 PROPHYLACTIC IMMUNOTHERAPY: ICD-10-CM

## 2019-12-03 DIAGNOSIS — G93.40 ENCEPHALOPATHY: ICD-10-CM

## 2019-12-03 DIAGNOSIS — R82.90 MALODOROUS URINE: ICD-10-CM

## 2019-12-03 DIAGNOSIS — A41.9 SEPSIS DUE TO URINARY TRACT INFECTION: Primary | ICD-10-CM

## 2019-12-03 DIAGNOSIS — N28.9 RENAL INSUFFICIENCY: ICD-10-CM

## 2019-12-03 DIAGNOSIS — Z79.899 HIGH RISK MEDICATION USE: ICD-10-CM

## 2019-12-03 DIAGNOSIS — R53.83 FATIGUE, UNSPECIFIED TYPE: ICD-10-CM

## 2019-12-03 PROBLEM — R65.20 SEVERE SEPSIS: Status: ACTIVE | Noted: 2019-12-03

## 2019-12-03 LAB
ALBUMIN SERPL BCP-MCNC: 3.7 G/DL (ref 3.5–5.2)
ALP SERPL-CCNC: 81 U/L (ref 55–135)
ALT SERPL W/O P-5'-P-CCNC: 15 U/L (ref 10–44)
ANION GAP SERPL CALC-SCNC: 9 MMOL/L (ref 8–16)
AST SERPL-CCNC: 20 U/L (ref 10–40)
BACTERIA #/AREA URNS AUTO: ABNORMAL /HPF
BASOPHILS # BLD AUTO: 0.02 K/UL (ref 0–0.2)
BASOPHILS NFR BLD: 0.1 % (ref 0–1.9)
BILIRUB SERPL-MCNC: 1.3 MG/DL (ref 0.1–1)
BILIRUB UR QL STRIP: NEGATIVE
BUN SERPL-MCNC: 32 MG/DL (ref 8–23)
CALCIUM SERPL-MCNC: 9.9 MG/DL (ref 8.7–10.5)
CHLORIDE SERPL-SCNC: 102 MMOL/L (ref 95–110)
CLARITY UR REFRACT.AUTO: ABNORMAL
CO2 SERPL-SCNC: 24 MMOL/L (ref 23–29)
COLOR UR AUTO: ABNORMAL
CREAT SERPL-MCNC: 1.7 MG/DL (ref 0.5–1.4)
DIFFERENTIAL METHOD: ABNORMAL
EOSINOPHIL # BLD AUTO: 0 K/UL (ref 0–0.5)
EOSINOPHIL NFR BLD: 0 % (ref 0–8)
ERYTHROCYTE [DISTWIDTH] IN BLOOD BY AUTOMATED COUNT: 15 % (ref 11.5–14.5)
EST. GFR  (AFRICAN AMERICAN): 49.2 ML/MIN/1.73 M^2
EST. GFR  (NON AFRICAN AMERICAN): 42.6 ML/MIN/1.73 M^2
GLUCOSE SERPL-MCNC: 216 MG/DL (ref 70–110)
GLUCOSE UR QL STRIP: NEGATIVE
GRAM STN SPEC: NORMAL
GRAM STN SPEC: NORMAL
HCT VFR BLD AUTO: 32.3 % (ref 40–54)
HGB BLD-MCNC: 10.8 G/DL (ref 14–18)
HGB UR QL STRIP: ABNORMAL
HYALINE CASTS UR QL AUTO: 17 /LPF
IMM GRANULOCYTES # BLD AUTO: 0.05 K/UL (ref 0–0.04)
IMM GRANULOCYTES NFR BLD AUTO: 0.2 % (ref 0–0.5)
INFLUENZA A, MOLECULAR: NEGATIVE
INFLUENZA B, MOLECULAR: NEGATIVE
KETONES UR QL STRIP: NEGATIVE
LACTATE SERPL-SCNC: 2.8 MMOL/L (ref 0.5–2.2)
LEUKOCYTE ESTERASE UR QL STRIP: ABNORMAL
LYMPHOCYTES # BLD AUTO: 1 K/UL (ref 1–4.8)
LYMPHOCYTES NFR BLD: 5.1 % (ref 18–48)
MCH RBC QN AUTO: 29.1 PG (ref 27–31)
MCHC RBC AUTO-ENTMCNC: 33.4 G/DL (ref 32–36)
MCV RBC AUTO: 87 FL (ref 82–98)
MICROSCOPIC COMMENT: ABNORMAL
MONOCYTES # BLD AUTO: 1.8 K/UL (ref 0.3–1)
MONOCYTES NFR BLD: 9 % (ref 4–15)
NEUTROPHILS # BLD AUTO: 17.2 K/UL (ref 1.8–7.7)
NEUTROPHILS NFR BLD: 85.6 % (ref 38–73)
NITRITE UR QL STRIP: NEGATIVE
NRBC BLD-RTO: 0 /100 WBC
PH UR STRIP: 5 [PH] (ref 5–8)
PLATELET # BLD AUTO: 135 K/UL (ref 150–350)
PMV BLD AUTO: 9.8 FL (ref 9.2–12.9)
POTASSIUM SERPL-SCNC: 3.8 MMOL/L (ref 3.5–5.1)
PROT SERPL-MCNC: 7 G/DL (ref 6–8.4)
PROT UR QL STRIP: ABNORMAL
RBC # BLD AUTO: 3.71 M/UL (ref 4.6–6.2)
RBC #/AREA URNS AUTO: 34 /HPF (ref 0–4)
SODIUM SERPL-SCNC: 135 MMOL/L (ref 136–145)
SP GR UR STRIP: 1.02 (ref 1–1.03)
SPECIMEN SOURCE: NORMAL
URN SPEC COLLECT METH UR: ABNORMAL
WBC # BLD AUTO: 20.1 K/UL (ref 3.9–12.7)
WBC #/AREA URNS AUTO: 15 /HPF (ref 0–5)

## 2019-12-03 PROCEDURE — 99223 1ST HOSP IP/OBS HIGH 75: CPT | Mod: ,,, | Performed by: HOSPITALIST

## 2019-12-03 PROCEDURE — 99285 EMERGENCY DEPT VISIT HI MDM: CPT | Mod: ,,, | Performed by: PHYSICIAN ASSISTANT

## 2019-12-03 PROCEDURE — 96365 THER/PROPH/DIAG IV INF INIT: CPT

## 2019-12-03 PROCEDURE — 87086 URINE CULTURE/COLONY COUNT: CPT

## 2019-12-03 PROCEDURE — 3075F SYST BP GE 130 - 139MM HG: CPT | Mod: CPTII,S$GLB,, | Performed by: CLINICAL NURSE SPECIALIST

## 2019-12-03 PROCEDURE — 81001 URINALYSIS AUTO W/SCOPE: CPT

## 2019-12-03 PROCEDURE — 84300 ASSAY OF URINE SODIUM: CPT

## 2019-12-03 PROCEDURE — 87040 BLOOD CULTURE FOR BACTERIA: CPT | Mod: 59

## 2019-12-03 PROCEDURE — 51701 INSERT BLADDER CATHETER: CPT

## 2019-12-03 PROCEDURE — 87077 CULTURE AEROBIC IDENTIFY: CPT

## 2019-12-03 PROCEDURE — 87502 INFLUENZA DNA AMP PROBE: CPT

## 2019-12-03 PROCEDURE — 99285 PR EMERGENCY DEPT VISIT,LEVEL V: ICD-10-PCS | Mod: ,,, | Performed by: PHYSICIAN ASSISTANT

## 2019-12-03 PROCEDURE — 3008F PR BODY MASS INDEX (BMI) DOCUMENTED: ICD-10-PCS | Mod: CPTII,S$GLB,, | Performed by: CLINICAL NURSE SPECIALIST

## 2019-12-03 PROCEDURE — 3079F PR MOST RECENT DIASTOLIC BLOOD PRESSURE 80-89 MM HG: ICD-10-PCS | Mod: CPTII,S$GLB,, | Performed by: CLINICAL NURSE SPECIALIST

## 2019-12-03 PROCEDURE — 99999 PR PBB SHADOW E&M-EST. PATIENT-LVL III: ICD-10-PCS | Mod: PBBFAC,,, | Performed by: CLINICAL NURSE SPECIALIST

## 2019-12-03 PROCEDURE — 3008F BODY MASS INDEX DOCD: CPT | Mod: CPTII,S$GLB,, | Performed by: CLINICAL NURSE SPECIALIST

## 2019-12-03 PROCEDURE — 82565 ASSAY OF CREATININE: CPT

## 2019-12-03 PROCEDURE — 99285 EMERGENCY DEPT VISIT HI MDM: CPT | Mod: 25

## 2019-12-03 PROCEDURE — 99223 PR INITIAL HOSPITAL CARE,LEVL III: ICD-10-PCS | Mod: ,,, | Performed by: HOSPITALIST

## 2019-12-03 PROCEDURE — 51798 US URINE CAPACITY MEASURE: CPT

## 2019-12-03 PROCEDURE — 99214 OFFICE O/P EST MOD 30 MIN: CPT | Mod: S$GLB,,, | Performed by: CLINICAL NURSE SPECIALIST

## 2019-12-03 PROCEDURE — 63600175 PHARM REV CODE 636 W HCPCS: Performed by: PHYSICIAN ASSISTANT

## 2019-12-03 PROCEDURE — 87088 URINE BACTERIA CULTURE: CPT

## 2019-12-03 PROCEDURE — 3079F DIAST BP 80-89 MM HG: CPT | Mod: CPTII,S$GLB,, | Performed by: CLINICAL NURSE SPECIALIST

## 2019-12-03 PROCEDURE — 80053 COMPREHEN METABOLIC PANEL: CPT | Mod: 91

## 2019-12-03 PROCEDURE — 96361 HYDRATE IV INFUSION ADD-ON: CPT

## 2019-12-03 PROCEDURE — 87186 SC STD MICRODIL/AGAR DIL: CPT

## 2019-12-03 PROCEDURE — 96375 TX/PRO/DX INJ NEW DRUG ADDON: CPT

## 2019-12-03 PROCEDURE — 83605 ASSAY OF LACTIC ACID: CPT

## 2019-12-03 PROCEDURE — 12000002 HC ACUTE/MED SURGE SEMI-PRIVATE ROOM

## 2019-12-03 PROCEDURE — 25000003 PHARM REV CODE 250: Performed by: PHYSICIAN ASSISTANT

## 2019-12-03 PROCEDURE — 3075F PR MOST RECENT SYSTOLIC BLOOD PRESS GE 130-139MM HG: ICD-10-PCS | Mod: CPTII,S$GLB,, | Performed by: CLINICAL NURSE SPECIALIST

## 2019-12-03 PROCEDURE — 99999 PR PBB SHADOW E&M-EST. PATIENT-LVL III: CPT | Mod: PBBFAC,,, | Performed by: CLINICAL NURSE SPECIALIST

## 2019-12-03 PROCEDURE — 85025 COMPLETE CBC W/AUTO DIFF WBC: CPT | Mod: 91

## 2019-12-03 PROCEDURE — 82570 ASSAY OF URINE CREATININE: CPT

## 2019-12-03 PROCEDURE — 87205 SMEAR GRAM STAIN: CPT

## 2019-12-03 PROCEDURE — 99214 PR OFFICE/OUTPT VISIT, EST, LEVL IV, 30-39 MIN: ICD-10-PCS | Mod: S$GLB,,, | Performed by: CLINICAL NURSE SPECIALIST

## 2019-12-03 RX ORDER — ONDANSETRON 2 MG/ML
4 INJECTION INTRAMUSCULAR; INTRAVENOUS EVERY 8 HOURS PRN
Status: DISCONTINUED | OUTPATIENT
Start: 2019-12-04 | End: 2019-12-09 | Stop reason: HOSPADM

## 2019-12-03 RX ORDER — QUETIAPINE FUMARATE 25 MG/1
25 TABLET, FILM COATED ORAL NIGHTLY
Status: DISCONTINUED | OUTPATIENT
Start: 2019-12-04 | End: 2019-12-09 | Stop reason: HOSPADM

## 2019-12-03 RX ORDER — IBUPROFEN 200 MG
16 TABLET ORAL
Status: DISCONTINUED | OUTPATIENT
Start: 2019-12-04 | End: 2019-12-09 | Stop reason: HOSPADM

## 2019-12-03 RX ORDER — SODIUM CHLORIDE 0.9 % (FLUSH) 0.9 %
10 SYRINGE (ML) INJECTION
Status: DISCONTINUED | OUTPATIENT
Start: 2019-12-04 | End: 2019-12-09 | Stop reason: HOSPADM

## 2019-12-03 RX ORDER — IBUPROFEN 200 MG
24 TABLET ORAL
Status: DISCONTINUED | OUTPATIENT
Start: 2019-12-04 | End: 2019-12-09 | Stop reason: HOSPADM

## 2019-12-03 RX ORDER — HEPARIN SODIUM 5000 [USP'U]/ML
5000 INJECTION, SOLUTION INTRAVENOUS; SUBCUTANEOUS EVERY 8 HOURS
Status: DISCONTINUED | OUTPATIENT
Start: 2019-12-04 | End: 2019-12-06

## 2019-12-03 RX ORDER — ATORVASTATIN CALCIUM 20 MG/1
40 TABLET, FILM COATED ORAL DAILY
Status: DISCONTINUED | OUTPATIENT
Start: 2019-12-04 | End: 2019-12-09 | Stop reason: HOSPADM

## 2019-12-03 RX ORDER — PROCHLORPERAZINE EDISYLATE 5 MG/ML
5 INJECTION INTRAMUSCULAR; INTRAVENOUS EVERY 6 HOURS PRN
Status: DISCONTINUED | OUTPATIENT
Start: 2019-12-04 | End: 2019-12-09 | Stop reason: HOSPADM

## 2019-12-03 RX ORDER — ASPIRIN 81 MG/1
81 TABLET ORAL DAILY
Status: DISCONTINUED | OUTPATIENT
Start: 2019-12-04 | End: 2019-12-09 | Stop reason: HOSPADM

## 2019-12-03 RX ORDER — DILTIAZEM HYDROCHLORIDE 240 MG/1
240 CAPSULE, COATED, EXTENDED RELEASE ORAL DAILY
Status: DISCONTINUED | OUTPATIENT
Start: 2019-12-04 | End: 2019-12-09 | Stop reason: HOSPADM

## 2019-12-03 RX ORDER — ATENOLOL 50 MG/1
50 TABLET ORAL DAILY
Status: DISCONTINUED | OUTPATIENT
Start: 2019-12-04 | End: 2019-12-04

## 2019-12-03 RX ORDER — ACETAMINOPHEN 325 MG/1
650 TABLET ORAL
Status: COMPLETED | OUTPATIENT
Start: 2019-12-03 | End: 2019-12-03

## 2019-12-03 RX ORDER — IPRATROPIUM BROMIDE AND ALBUTEROL SULFATE 2.5; .5 MG/3ML; MG/3ML
3 SOLUTION RESPIRATORY (INHALATION) EVERY 6 HOURS PRN
Status: DISCONTINUED | OUTPATIENT
Start: 2019-12-04 | End: 2019-12-09 | Stop reason: HOSPADM

## 2019-12-03 RX ORDER — METHYLPHENIDATE HYDROCHLORIDE 10 MG/1
10 TABLET ORAL 2 TIMES DAILY WITH MEALS
Qty: 60 TABLET | Refills: 0 | Status: SHIPPED | OUTPATIENT
Start: 2019-12-03 | End: 2020-01-15 | Stop reason: SDUPTHER

## 2019-12-03 RX ORDER — TALC
6 POWDER (GRAM) TOPICAL NIGHTLY PRN
Status: DISCONTINUED | OUTPATIENT
Start: 2019-12-04 | End: 2019-12-09 | Stop reason: HOSPADM

## 2019-12-03 RX ORDER — DONEPEZIL HYDROCHLORIDE 5 MG/1
5 TABLET, FILM COATED ORAL NIGHTLY
Status: DISCONTINUED | OUTPATIENT
Start: 2019-12-04 | End: 2019-12-09 | Stop reason: HOSPADM

## 2019-12-03 RX ORDER — IRBESARTAN 300 MG/1
300 TABLET ORAL NIGHTLY
Status: DISCONTINUED | OUTPATIENT
Start: 2019-12-04 | End: 2019-12-03

## 2019-12-03 RX ORDER — ACETAMINOPHEN 325 MG/1
650 TABLET ORAL EVERY 4 HOURS PRN
Status: DISCONTINUED | OUTPATIENT
Start: 2019-12-04 | End: 2019-12-09 | Stop reason: HOSPADM

## 2019-12-03 RX ADMIN — ACETAMINOPHEN 650 MG: 325 TABLET ORAL at 07:12

## 2019-12-03 RX ADMIN — SODIUM CHLORIDE 3129 ML: 0.9 INJECTION, SOLUTION INTRAVENOUS at 07:12

## 2019-12-03 RX ADMIN — PIPERACILLIN AND TAZOBACTAM 4.5 G: 4; .5 INJECTION, POWDER, FOR SOLUTION INTRAVENOUS at 11:12

## 2019-12-03 RX ADMIN — CEFTRIAXONE 2 G: 2 INJECTION, SOLUTION INTRAVENOUS at 10:12

## 2019-12-03 NOTE — TELEPHONE ENCOUNTER
Spoke with patient and her .  She is concerned about the side effects of Rituxan but would like to proceed. Will hold on immunosuppression since patient may have a urinary tract infection.  She will contact me once it is treated.  She believes he had hep B vaccination but his hep B S antibody is negative.  Will ask ID if additional vaccines needed and if he has been adequately vaccinated for hepatitis-B.

## 2019-12-03 NOTE — PROGRESS NOTES
Subjective:       Patient ID: Bessy Nunez is a 59 y.o. male who presents today for a routine clinic visit for CNS vasculitis. He was last seen by me in July 2019. The history has been provided by the patient. He is accompanied by his wife. He is 40 minutes late for the appointment today.      HPI:  · DMT: Cytoxan monthly (he has had 29 doses); managed by Dr. Goldstein--His last infusion was 10/16/19.   · Side effects from DMT? No  · Taking vitamin D3 as recommended? Yes -  Dose: 5000 units daily   · He saw Dr. Durant in rheumatology recently who suggested use of Rituxan   · Symptoms are improved for a shorter duration of time than they were in the past post infusion.   His wife reports that Bessy is not cooperating lately. He has not been taking his medication. He does not take initiative to do anything. He has not bathed in a month.   Since he missed his November infusion, his wife reports that there has been a drastic decline. He has fallen a few times in the past few weeks.     Ritalin is helpful at times and helps to keep him awake.   He has had several episodes of urinary incontinence. He has also had some bowel accidents.   His wife reports that Bessy yells at her a lot.   Bessy denies any hallucinations. He denies any muscle spasms. He denies pain today, but tells his wife that he has neck pain sometimes.         SOCIAL HISTORY  Social History     Tobacco Use    Smoking status: Never Smoker    Smokeless tobacco: Never Used   Substance Use Topics    Alcohol use: No     Alcohol/week: 0.0 standard drinks     Frequency: Never     Drinks per session: 1 or 2     Binge frequency: Never    Drug use: No     Living arrangements - the patient lives with his wife  Employment: receives disability       Objective:        1. 25 foot timed walk: 18.42 seconds today (shuffling gait, unsteady); was 7.45 seconds at last visit without assist   Neurologic Exam      He states that the year is 2000. He knows the month is  December. He thinks it is the 20th. He knows he is at [facility]. He knows my name. He knows the [facility].   There is a strong urine odor.     Imaging:     Results for orders placed during the hospital encounter of 04/15/19   MRI Brain Demyelinating W W/O Contrast    Impression Remote microvascular ischemic changes supratentorial and infratentorial with areas suggesting amyloid angiopathy.    Incidental enhancing tiny venous angioma right parietal lobe.    findings in the cerebral white matter which are not typical for multiple sclerosis.  No new or enhancing lesions to suggest active disease.      Electronically signed by: Wing Galo MD  Date:    04/15/2019  Time:    11:32         Labs:     Lab Results   Component Value Date    FLTFDSJL70PH 47 03/04/2019    MVHQZIAF87BK 36 08/15/2018    PFBKTEEA49RM 11 (L) 05/17/2017     Lab Results   Component Value Date    WV9CJUFY 87.9 (H) 08/15/2018    ABSOLUTECD3 1271 08/15/2018    VZ4PPIAO 22.6 08/15/2018    ABSOLUTECD8 327 08/15/2018    EX1SZOUN 61.7 (H) 08/15/2018    ABSOLUTECD4 892 08/15/2018    LABCD48 2.73 08/15/2018     Lab Results   Component Value Date    WBC 5.75 11/13/2019    RBC 3.92 (L) 11/13/2019    HGB 11.5 (L) 11/13/2019    HCT 34.4 (L) 11/13/2019    MCV 88 11/13/2019    MCH 29.3 11/13/2019    MCHC 33.4 11/13/2019    RDW 15.1 (H) 11/13/2019     (L) 11/13/2019    MPV 9.6 11/13/2019    GRAN 4.2 11/13/2019    GRAN 73.7 (H) 11/13/2019    LYMPH 0.9 (L) 11/13/2019    LYMPH 15.1 (L) 11/13/2019    MONO 0.5 11/13/2019    MONO 8.5 11/13/2019    EOS 0.1 11/13/2019    BASO 0.02 11/13/2019    EOSINOPHIL 1.7 11/13/2019    BASOPHIL 0.3 11/13/2019     Sodium   Date Value Ref Range Status   11/13/2019 137 136 - 145 mmol/L Final     Potassium   Date Value Ref Range Status   11/13/2019 3.9 3.5 - 5.1 mmol/L Final     Chloride   Date Value Ref Range Status   11/13/2019 104 95 - 110 mmol/L Final     CO2   Date Value Ref Range Status   11/13/2019 24 23 - 29 mmol/L Final      Glucose   Date Value Ref Range Status   11/13/2019 266 (H) 70 - 110 mg/dL Final     BUN, Bld   Date Value Ref Range Status   11/13/2019 17 8 - 23 mg/dL Final     Creatinine   Date Value Ref Range Status   11/13/2019 1.0 0.5 - 1.4 mg/dL Final     Calcium   Date Value Ref Range Status   11/13/2019 9.4 8.7 - 10.5 mg/dL Final     Total Protein   Date Value Ref Range Status   11/13/2019 7.0 6.0 - 8.4 g/dL Final     Albumin   Date Value Ref Range Status   11/13/2019 3.8 3.5 - 5.2 g/dL Final   10/11/2013 4.3 3.6 - 5.1 g/dL Final     Comment:     @ Test Performed By:  [employer] AMELIA Almazan M.D.,   [id number] [place] [id number]  [facility] #[id number]     Total Bilirubin   Date Value Ref Range Status   11/13/2019 0.8 0.1 - 1.0 mg/dL Final     Comment:     For infants and newborns, interpretation of results should be based  on gestational age, weight and in agreement with clinical  observations.  Premature Infant recommended reference ranges:  Up to 24 hours.............<8.0 mg/dL  Up to 48 hours............<12.0 mg/dL  3-5 days..................<15.0 mg/dL  6-29 days.................<15.0 mg/dL       Alkaline Phosphatase   Date Value Ref Range Status   11/13/2019 78 55 - 135 U/L Final     AST   Date Value Ref Range Status   11/13/2019 21 10 - 40 U/L Final     ALT   Date Value Ref Range Status   11/13/2019 17 10 - 44 U/L Final     Anion Gap   Date Value Ref Range Status   11/13/2019 9 8 - 16 mmol/L Final     eGFR if    Date Value Ref Range Status   11/13/2019 >60.0 >60 mL/min/1.73 m^2 Final     eGFR if non    Date Value Ref Range Status   11/13/2019 >60.0 >60 mL/min/1.73 m^2 Final     Comment:     Calculation used to obtain the estimated glomerular filtration  rate (eGFR) is the CKD-EPI equation.        Diagnosis/Assessment/Plan:    1. CNS vasculitis  · Assessment: Bessy appears unwell today. He is unkempt and has a strong odor  of urine. His gait is slow and shuffling. I highly suspect that he has a UTI. We will check CBC, CMP, and get urine sample. His wife is having a very difficult time getting him to do anything, including taking any medications. She continues to endorse that in the last few months of Cytoxan treatment, the effect did not last as long as it had in the past.   · Imaging: May plan to get new MRI now  · Disease Modifying Therapies: Will discuss plan for DMT with Bhargav Kaur, and Angelita. It is clear that he has had a decline since he did not get a November Cytoxan treatment. However, we need to wait until infection is cleared before proceeding.     2. Symptom Assessment / Management  · Bladder Dysfunction: Will check UA today.   · Adaptive needs: His wife is working with [employer] to see about a home care attendant.   · Fatigue: Ritalin refilled     Our visit today lasted 30 minutes, and 100% of this time was spent face to face with the patient. Over 50% of this visit included discussion of the treatment plan/medication changes/symptom management/exam findings/imaging results/coordination of care. The patient agrees with the plan of care.       Problem List Items Addressed This Visit     CNS vasculitis failed imuran; on cytoxan    Overview     Failed Cytoxan hiatus and transition to Imuran Sept/Oct 2018;   Now much improved back on Cytoxan;   Refer to Dr. Daley of rheumatology for second opinion on management         Relevant Orders    Urinalysis, Reflex to Urine Culture Urine, Clean Catch    CBC auto differential (Completed)    Comprehensive metabolic panel (Completed)      Other Visit Diagnoses     Malodorous urine    -  Primary    Relevant Orders    Urinalysis, Reflex to Urine Culture Urine, Clean Catch    CBC auto differential (Completed)    Comprehensive metabolic panel (Completed)    Fatigue, unspecified type        Relevant Medications    methylphenidate HCl (RITALIN) 10 MG tablet    At risk for  infection due to chemotherapy        Relevant Orders    CBC auto differential (Completed)    Comprehensive metabolic panel (Completed)        Beti Boswell, AGCNS-BC, MSCN

## 2019-12-03 NOTE — Clinical Note
Hi there, I saw Mr. Nunez in clinic today, and he has had a very noticeable decline since we last saw him. He was disheveled, had a shuffling gait, and his wife reported that she cannot get him to do anything (including bathe, take medications). He had a very strong odor of urine, but he could not leave a sample, even after an hour of trying. His WBC is 21, and GFR is 45. I sent him to the [facility] once I got the results. It seems that he has had a decline without Cytoxan treatment in November. What are your thoughts on dosing him once this infection is cleared and then discussing other options? Perhaps there is some combination therapy we could consider? Dr. Love and I value your advice. Thank you!Beti

## 2019-12-03 NOTE — TELEPHONE ENCOUNTER
Left message for patient's wife on her cell phone to call the office to discuss the possibility of using Rituxan.

## 2019-12-04 LAB
ANION GAP SERPL CALC-SCNC: 10 MMOL/L (ref 8–16)
ANISOCYTOSIS BLD QL SMEAR: SLIGHT
BASOPHILS # BLD AUTO: 0.02 K/UL (ref 0–0.2)
BASOPHILS NFR BLD: 0.2 % (ref 0–1.9)
BUN SERPL-MCNC: 24 MG/DL (ref 8–23)
BURR CELLS BLD QL SMEAR: ABNORMAL
CALCIUM SERPL-MCNC: 8.4 MG/DL (ref 8.7–10.5)
CHLORIDE SERPL-SCNC: 110 MMOL/L (ref 95–110)
CO2 SERPL-SCNC: 19 MMOL/L (ref 23–29)
CREAT SERPL-MCNC: 1.2 MG/DL (ref 0.5–1.4)
CREAT UR-MCNC: 215 MG/DL (ref 23–375)
DIFFERENTIAL METHOD: ABNORMAL
EOSINOPHIL # BLD AUTO: 0 K/UL (ref 0–0.5)
EOSINOPHIL NFR BLD: 0.1 % (ref 0–8)
ERYTHROCYTE [DISTWIDTH] IN BLOOD BY AUTOMATED COUNT: 15.1 % (ref 11.5–14.5)
EST. GFR  (AFRICAN AMERICAN): >60 ML/MIN/1.73 M^2
EST. GFR  (NON AFRICAN AMERICAN): >60 ML/MIN/1.73 M^2
ESTIMATED AVG GLUCOSE: 166 MG/DL (ref 68–131)
GLUCOSE SERPL-MCNC: 149 MG/DL (ref 70–110)
HBA1C MFR BLD HPLC: 7.4 % (ref 4–5.6)
HCT VFR BLD AUTO: 29.6 % (ref 40–54)
HGB BLD-MCNC: 9.6 G/DL (ref 14–18)
HYPOCHROMIA BLD QL SMEAR: ABNORMAL
IMM GRANULOCYTES # BLD AUTO: 0.06 K/UL (ref 0–0.04)
IMM GRANULOCYTES NFR BLD AUTO: 0.5 % (ref 0–0.5)
LACTATE SERPL-SCNC: 1.5 MMOL/L (ref 0.5–2.2)
LYMPHOCYTES # BLD AUTO: 0.5 K/UL (ref 1–4.8)
LYMPHOCYTES NFR BLD: 4.5 % (ref 18–48)
MCH RBC QN AUTO: 28.4 PG (ref 27–31)
MCHC RBC AUTO-ENTMCNC: 32.4 G/DL (ref 32–36)
MCV RBC AUTO: 88 FL (ref 82–98)
MONOCYTES # BLD AUTO: 0.7 K/UL (ref 0.3–1)
MONOCYTES NFR BLD: 6.2 % (ref 4–15)
NEUTROPHILS # BLD AUTO: 9.8 K/UL (ref 1.8–7.7)
NEUTROPHILS NFR BLD: 88.5 % (ref 38–73)
NRBC BLD-RTO: 0 /100 WBC
OVALOCYTES BLD QL SMEAR: ABNORMAL
PLATELET # BLD AUTO: 93 K/UL (ref 150–350)
PMV BLD AUTO: 9.1 FL (ref 9.2–12.9)
POCT GLUCOSE: 121 MG/DL (ref 70–110)
POCT GLUCOSE: 125 MG/DL (ref 70–110)
POCT GLUCOSE: 156 MG/DL (ref 70–110)
POIKILOCYTOSIS BLD QL SMEAR: SLIGHT
POLYCHROMASIA BLD QL SMEAR: ABNORMAL
POTASSIUM SERPL-SCNC: 3.6 MMOL/L (ref 3.5–5.1)
RBC # BLD AUTO: 3.38 M/UL (ref 4.6–6.2)
SODIUM SERPL-SCNC: 139 MMOL/L (ref 136–145)
SODIUM UR-SCNC: 32 MMOL/L (ref 20–250)
WBC # BLD AUTO: 11.07 K/UL (ref 3.9–12.7)

## 2019-12-04 PROCEDURE — 99233 SBSQ HOSP IP/OBS HIGH 50: CPT | Mod: ,,, | Performed by: PSYCHIATRY & NEUROLOGY

## 2019-12-04 PROCEDURE — 25500020 PHARM REV CODE 255: Performed by: INTERNAL MEDICINE

## 2019-12-04 PROCEDURE — 11000001 HC ACUTE MED/SURG PRIVATE ROOM

## 2019-12-04 PROCEDURE — 80048 BASIC METABOLIC PNL TOTAL CA: CPT

## 2019-12-04 PROCEDURE — 25000003 PHARM REV CODE 250: Performed by: HOSPITALIST

## 2019-12-04 PROCEDURE — 99232 SBSQ HOSP IP/OBS MODERATE 35: CPT | Mod: ,,, | Performed by: INTERNAL MEDICINE

## 2019-12-04 PROCEDURE — 99233 PR SUBSEQUENT HOSPITAL CARE,LEVL III: ICD-10-PCS | Mod: ,,, | Performed by: PSYCHIATRY & NEUROLOGY

## 2019-12-04 PROCEDURE — 85025 COMPLETE CBC W/AUTO DIFF WBC: CPT

## 2019-12-04 PROCEDURE — 36415 COLL VENOUS BLD VENIPUNCTURE: CPT

## 2019-12-04 PROCEDURE — 97535 SELF CARE MNGMENT TRAINING: CPT

## 2019-12-04 PROCEDURE — 83036 HEMOGLOBIN GLYCOSYLATED A1C: CPT

## 2019-12-04 PROCEDURE — 63600175 PHARM REV CODE 636 W HCPCS: Performed by: HOSPITALIST

## 2019-12-04 PROCEDURE — C9399 UNCLASSIFIED DRUGS OR BIOLOG: HCPCS | Performed by: HOSPITALIST

## 2019-12-04 PROCEDURE — 97161 PT EVAL LOW COMPLEX 20 MIN: CPT

## 2019-12-04 PROCEDURE — 94761 N-INVAS EAR/PLS OXIMETRY MLT: CPT

## 2019-12-04 PROCEDURE — 97165 OT EVAL LOW COMPLEX 30 MIN: CPT

## 2019-12-04 PROCEDURE — A9585 GADOBUTROL INJECTION: HCPCS | Performed by: INTERNAL MEDICINE

## 2019-12-04 PROCEDURE — 99232 PR SUBSEQUENT HOSPITAL CARE,LEVL II: ICD-10-PCS | Mod: ,,, | Performed by: INTERNAL MEDICINE

## 2019-12-04 PROCEDURE — 97530 THERAPEUTIC ACTIVITIES: CPT

## 2019-12-04 PROCEDURE — 97116 GAIT TRAINING THERAPY: CPT

## 2019-12-04 RX ORDER — METHYLPHENIDATE HYDROCHLORIDE 5 MG/1
10 TABLET ORAL 2 TIMES DAILY WITH MEALS
Status: DISCONTINUED | OUTPATIENT
Start: 2019-12-04 | End: 2019-12-09 | Stop reason: HOSPADM

## 2019-12-04 RX ORDER — SERTRALINE HYDROCHLORIDE 50 MG/1
100 TABLET, FILM COATED ORAL DAILY
Status: DISCONTINUED | OUTPATIENT
Start: 2019-12-04 | End: 2019-12-09 | Stop reason: HOSPADM

## 2019-12-04 RX ORDER — MICONAZOLE NITRATE 2 %
POWDER (GRAM) TOPICAL 2 TIMES DAILY
Status: DISCONTINUED | OUTPATIENT
Start: 2019-12-04 | End: 2019-12-09 | Stop reason: HOSPADM

## 2019-12-04 RX ORDER — GLUCAGON 1 MG
1 KIT INJECTION
Status: DISCONTINUED | OUTPATIENT
Start: 2019-12-04 | End: 2019-12-09 | Stop reason: HOSPADM

## 2019-12-04 RX ORDER — INSULIN ASPART 100 [IU]/ML
1-10 INJECTION, SOLUTION INTRAVENOUS; SUBCUTANEOUS
Status: DISCONTINUED | OUTPATIENT
Start: 2019-12-04 | End: 2019-12-09 | Stop reason: HOSPADM

## 2019-12-04 RX ORDER — GADOBUTROL 604.72 MG/ML
10 INJECTION INTRAVENOUS
Status: COMPLETED | OUTPATIENT
Start: 2019-12-04 | End: 2019-12-04

## 2019-12-04 RX ORDER — SODIUM CHLORIDE 9 MG/ML
INJECTION, SOLUTION INTRAVENOUS CONTINUOUS
Status: ACTIVE | OUTPATIENT
Start: 2019-12-04 | End: 2019-12-04

## 2019-12-04 RX ADMIN — MICONAZOLE NITRATE: 20 POWDER TOPICAL at 08:12

## 2019-12-04 RX ADMIN — ACETAMINOPHEN 650 MG: 325 TABLET ORAL at 01:12

## 2019-12-04 RX ADMIN — HEPARIN SODIUM 5000 UNITS: 5000 INJECTION, SOLUTION INTRAVENOUS; SUBCUTANEOUS at 10:12

## 2019-12-04 RX ADMIN — SODIUM CHLORIDE: 0.9 INJECTION, SOLUTION INTRAVENOUS at 01:12

## 2019-12-04 RX ADMIN — ASPIRIN 81 MG: 81 TABLET, COATED ORAL at 10:12

## 2019-12-04 RX ADMIN — QUETIAPINE FUMARATE 25 MG: 25 TABLET, FILM COATED ORAL at 01:12

## 2019-12-04 RX ADMIN — QUETIAPINE FUMARATE 25 MG: 25 TABLET, FILM COATED ORAL at 08:12

## 2019-12-04 RX ADMIN — GADOBUTROL 10 ML: 604.72 INJECTION INTRAVENOUS at 04:12

## 2019-12-04 RX ADMIN — ACETAMINOPHEN 650 MG: 325 TABLET ORAL at 07:12

## 2019-12-04 RX ADMIN — HEPARIN SODIUM 5000 UNITS: 5000 INJECTION, SOLUTION INTRAVENOUS; SUBCUTANEOUS at 02:12

## 2019-12-04 RX ADMIN — SODIUM CHLORIDE: 0.9 INJECTION, SOLUTION INTRAVENOUS at 02:12

## 2019-12-04 RX ADMIN — PIPERACILLIN AND TAZOBACTAM 4.5 G: 4; .5 INJECTION, POWDER, FOR SOLUTION INTRAVENOUS at 06:12

## 2019-12-04 RX ADMIN — ACETAMINOPHEN 650 MG: 325 TABLET ORAL at 08:12

## 2019-12-04 RX ADMIN — DONEPEZIL HYDROCHLORIDE 5 MG: 5 TABLET, FILM COATED ORAL at 08:12

## 2019-12-04 RX ADMIN — INSULIN DETEMIR 20 UNITS: 100 INJECTION, SOLUTION SUBCUTANEOUS at 10:12

## 2019-12-04 RX ADMIN — SERTRALINE HYDROCHLORIDE 100 MG: 50 TABLET ORAL at 10:12

## 2019-12-04 RX ADMIN — ATORVASTATIN CALCIUM 40 MG: 20 TABLET, FILM COATED ORAL at 10:12

## 2019-12-04 RX ADMIN — PIPERACILLIN AND TAZOBACTAM 4.5 G: 4; .5 INJECTION, POWDER, FOR SOLUTION INTRAVENOUS at 02:12

## 2019-12-04 RX ADMIN — DILTIAZEM HYDROCHLORIDE 240 MG: 240 CAPSULE, COATED, EXTENDED RELEASE ORAL at 10:12

## 2019-12-04 RX ADMIN — PIPERACILLIN AND TAZOBACTAM 4.5 G: 4; .5 INJECTION, POWDER, FOR SOLUTION INTRAVENOUS at 11:12

## 2019-12-04 RX ADMIN — HEPARIN SODIUM 5000 UNITS: 5000 INJECTION, SOLUTION INTRAVENOUS; SUBCUTANEOUS at 06:12

## 2019-12-04 RX ADMIN — MICONAZOLE NITRATE: 20 POWDER TOPICAL at 01:12

## 2019-12-04 NOTE — ED NOTES
Pt brought into bed 16. Changed patient into gown and barrier cream applied to groin. Pt has redness present. Pt was saturated with foul smelling urine. Wife and son now at bedside. Report hand off to DIANA Bae.

## 2019-12-04 NOTE — PLAN OF CARE
12/04/19 1119   Discharge Assessment   Assessment Type Discharge Planning Assessment   Confirmed/corrected address and phone number on facesheet? Yes   Assessment information obtained from? Caregiver   Expected Length of Stay (days) 5   Communicated expected length of stay with patient/caregiver yes   Prior to hospitilization cognitive status: Alert/Oriented   Prior to hospitalization functional status: Independent   Current cognitive status: Not Oriented to Place;Not Oriented to Time   Current Functional Status: Needs Assistance   Lives With spouse   Able to Return to Prior Arrangements other (see comments)   Is patient able to care for self after discharge? No   Patient's perception of discharge disposition skilled nursing facility   Readmission Within the Last 30 Days no previous admission in last 30 days   Patient currently being followed by outpatient case management? No   Equipment Currently Used at Home bath bench;rollator   Do you have any problems affording any of your prescribed medications? No   Is the patient taking medications as prescribed? yes   Does the patient have transportation home? Yes   Transportation Anticipated family or friend will provide   Discharge Plan A Skilled Nursing Facility   Discharge Plan B Home Health   Patient/Family in Agreement with Plan yes   Met with patient's spouse at bedside. Discussed role of CM. Patient is not current with a home health agency but spouse believes he will need home health or perhaps snf placement.

## 2019-12-04 NOTE — PLAN OF CARE
Discharge Recommendation: SNF.    Eval completed today. PT goals appropriate.    Ceci Vilchis PT, DPT  2019  Pager: [phone]    Problem: Physical Therapy Goal  Goal: Physical Therapy Goal  Description  Goals to be met by: 19     Patient will increase functional independence with mobility by performin. Supine to sit with MInimal Assistance  2. Sit to supine with MInimal Assistance  3. Sit to stand transfer with Contact Guard Assistance with LRAD or no AD  4. Bed to chair transfer with Contact Guard Assistance using LRAD or no AD  5. Gait  x 25 feet with Contact Guard Assistance using LRAD or no AD.   6. Lower extremity exercise program x20 reps per handout, with assistance as needed  7. Stand for 1 minutes with Contact Guard Assistance with no AD to improve balance for standing ADLs     Outcome: Ongoing, Progressing

## 2019-12-04 NOTE — PROGRESS NOTES
Patient with obvious infection based on CBC; suspect UTI due to strong odor of urine today when he was in clinic. He was unable to successfully leave urine sample. GFR low and BUN elevated. Spoke with patient's wife. She opted out of oral antibiotics and prefers to take him to the ER for treatment.

## 2019-12-04 NOTE — PLAN OF CARE
Evaluated pt and established OT POC. Continue OT as tolerated.  Althea Ramos OT  12/4/2019    Problem: Occupational Therapy Goal  Goal: Occupational Therapy Goal  Description  Goals to be met by: 12/12/19     Patient will increase functional independence with ADLs by performing:    UE Dressing with Stand-by Assistance.  LE Dressing with Moderate Assistance.  Grooming while standing with Moderate Assistance.  Toileting from toilet with Minimal Assistance for hygiene and clothing management.   Rolling to Bilateral with Moderate Assistance.   Supine to sit with Moderate Assistance.  Step transfer with Contact Guard Assistance  Toilet transfer to toilet with Contact Guard Assistance.     Outcome: Ongoing, Progressing

## 2019-12-04 NOTE — PT/OT/SLP PROGRESS
Speech Language Pathology      Bessy Nunez  MRN: [id number]    Patient not seen today secondary to CT/MRI(pt leaving with transport to go for MRI. Nurse reported change mental status and poororal containment of liquids prompting order for bedside swallow evaluation. ). SLP likely unable to return today after pt returns from MRI. Will re-attempt 12/5.  Consider making pt NPO if concerns and risks of aspiration appear high given changes in mental status.     JULIO Mckeon, CCC-SLP     JULIO Mckeon, CCC-SLP  Speech Language Pathologist  [phone]  12/4/2019

## 2019-12-04 NOTE — PROGRESS NOTES
Hospital Medicine  Progress Note      Patient Name: Bessy Nunez  MRN: [id number]  Date of Admission: 12/3/2019     Principal Problem: Severe sepsis     Subjective     Febrile overnight. Wife concerned about worsening mental status this morning and seizure like activity (rt leg shaking and tachypnea with unresponsiveness). Neurology consulted and recommended MRI w/wo contrast and EEG. Disoriented to time, but awakens and answers some questions appropriately during eval. Denies dysuria, N/V, SOB.      Review of Systems   Per wife  Constitutional:POSTIVE for chills, fatigue, fever.   HENT: Negative for sore throat, trouble swallowing.    Eyes: Negative for photophobia, visual disturbance.   Respiratory: Negative for cough, shortness of breath.    Cardiovascular: Negative for chest pain, palpitations, leg swelling.   Gastrointestinal: Negative for abdominal pain, constipation, diarrhea, nausea, vomiting.   Endocrine: Negative for cold intolerance, heat intolerance.   Genitourinary: Negative for dysuria, frequency.   Musculoskeletal: Negative for arthralgias, myalgias.   Skin: Negative for rash, wound, erythema   Neurological: Negative for dizziness, syncope, weakness, light-headedness.   Psychiatric/Behavioral: POSITIVE for confusion,      Medications  Scheduled Meds:   aspirin  81 mg Oral Daily    atorvastatin  40 mg Oral Daily    diltiaZEM  240 mg Oral Daily    donepezil  5 mg Oral QHS    heparin (porcine)  5,000 Units Subcutaneous Q8H    insulin detemir U-100  20 Units Subcutaneous Daily    methylphenidate HCl  10 mg Oral BID WM    miconazole NITRATE 2 %   Topical (Top) BID    piperacillin-tazobactam (ZOSYN) IVPB  4.5 g Intravenous Q8H    QUEtiapine  25 mg Oral QHS    sertraline  100 mg Oral Daily     Continuous Infusions:  PRN Meds:.acetaminophen, albuterol-ipratropium, Dextrose 10% Bolus, Dextrose 10% Bolus, glucagon (human recombinant), glucose, glucose, insulin aspart U-100, melatonin, ondansetron,  prochlorperazine, sodium chloride 0.9%    Objective    Physical Examination    Temp:  [97.6 °F (36.4 °C)-101.9 °F (38.8 °C)]   Pulse:  []   Resp:  [16-20]   BP: (119-154)/(59-83)   SpO2:  [92 %-100 %]     Gen: NAD, ill appearing, lethargic  Head: NC, AT  Eyes: PERRLA, EOMI  Throat: MMM, OP clear  CV: RRR, no M/R/G, no peripheral edema, no JVD  Resp: CTAB, no increased work of breathing on room air  GI: Soft, NT, ND, +BS  Ext: MAEW, no c/c/e  Skin: erythematous skin at groin and lower abdomen  Neuro: AAOx2, CN grossly intact, no focal neurologic deficits  Psychiatry: Normal mood, normal affect, no SI/HI    CBC  Recent Labs   Lab 12/03/19  1547 12/03/19 1937 12/04/19  0454   WBC 21.48* 20.10* 11.07   HGB 11.2* 10.8* 9.6*   HCT 33.6* 32.3* 29.6*   * 135* 93*     CMP  Recent Labs   Lab 12/03/19  1547 12/03/19  1937 12/04/19  0454    135* 139   K 4.3 3.8 3.6    102 110   CO2 23 24 19*   BUN 28* 32* 24*   CREATININE 1.6* 1.7* 1.2   * 216* 149*   CALCIUM 9.6 9.9 8.4*   ALKPHOS 84 81  --    ALT 16 15  --    AST 20 20  --    ALBUMIN 3.7 3.7  --    PROT 7.2 7.0  --    BILITOT 1.3* 1.3*  --            Hospital Course:    Assessment and Plan:    Severe Sepsis 2/2 UTI  Acute Encephalopathy, Improved  -Pt. With low grade fever, WBC 20, and lactate elevated to 2.8  -CXR without consolidation, no URI symptoms  -U/A with 15 WBC, LE +, but nitrite negative. Possible source  -F/U urine and blood cultures- NGTD  -IV fluids given with normalization of lactate, continue with IV NS at 100 cc/hr  -Start zosyn for broad spectrum control and deescalate once clear source known  --flu negative  --f/u MRI, EEG, neuro recs     CNS Vasculitis  -Pt. Followed outpatient by neurology, continue cytoxan monthly  -Continue meds donepezil, sertraline, seroquel, and methylphenidate for mentation  - neuro consulted  -f/u MRI     Intertrigo  -Rash noted in folds of groin, will treat with topical  miconazole  -Monitor     MANISH  -Baseline Cr ~1.0, pt. Presents with Cr 1.7  -Suspect prerenal source in setting of sepsis  - improving today Cr 1.2  -Hold neprohotoxic meds such as ARB irbesartan  - daily bmp     DM2  - Last HbA1c: 6.2 on 4/2019; now 7.4  - Diabetic diet with SSI insulin ordered  -Lantus 40 units BID listed as home dose, pt. Reports only being on 40 unitsdaily and wife states he misses that dose sometimes  -Levemir 20 units for now, uptitrate as needed     Hypertension:  - continue atenolol, diltiazem. Hold irbesartan in setting of MANISH- can resume If needed     Dyslipidemia:  - continue atorvastatin     Hx of CVA:  - continue asa and atorvastatin    Diet: diabetic  VTE PPX: heparin    Goals of care: full  Josefina Urena M.D.  Department of Hospital Medicine  [facility]  [phone] (pager)

## 2019-12-04 NOTE — H&P
Hospital Medicine  History and Physical Exam    Team: [facility] Kiko Carter MD  Admit Date: 12/3/2019  Principal Problem:  Severe sepsis   Patient information was obtained from past medical records and ER records.   Primary care Physician: Edgar Nova MD  Code status: Full Code    HPI: 59 yo M with PMHx primary CNS Vasculitis (on cytoxan monthly), DM2, and Hx of CVA presented to Neurology clinic today for usual f/u. While in clinic, the patient's wife reported that the patient had been acting unusually over the last week or so. She reports that he has been refusing baths, hiding his medication, and urinating on himself frequently. The patient has some cognitive dysfunction from his CNS vasculitis at baseline, but the patient usually is competent and compliant with his grooming and medications. The patient and his wife deny and fevers, cough, nausea, vomiting, or diarrhea. The patient's wife does report that he has not been having as much PO intake as usual. The patient states that he is fine and nothing is wrong. He is alert to person and situation but not year.      Hemoglobin A1C   Date Value Ref Range Status   04/01/2019 6.2 (H) 4.0 - 5.6 % Final     Comment:     ADA Screening Guidelines:  5.7-6.4%  Consistent with prediabetes  >or=6.5%  Consistent with diabetes  High levels of fetal hemoglobin interfere with the HbA1C  assay. Heterozygous hemoglobin variants (HbS, HgC, etc)do  not significantly interfere with this assay.   However, presence of multiple variants may affect accuracy.     10/15/2018 6.2 (H) 4.0 - 5.6 % Final     Comment:     ADA Screening Guidelines:  5.7-6.4%  Consistent with prediabetes  >or=6.5%  Consistent with diabetes  High levels of fetal hemoglobin interfere with the HbA1C  assay. Heterozygous hemoglobin variants (HbS, HgC, etc)do  not significantly interfere with this assay.   However, presence of multiple variants may affect accuracy.     08/01/2018 6.2 (H) 4.0 - 5.6 % Final      Comment:     ADA Screening Guidelines:  5.7-6.4%  Consistent with prediabetes  >or=6.5%  Consistent with diabetes  High levels of fetal hemoglobin interfere with the HbA1C  assay. Heterozygous hemoglobin variants (HbS, HgC, etc)do  not significantly interfere with this assay.   However, presence of multiple variants may affect accuracy.         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    Past Surgical History: Patient has a past surgical history that includes Skin cancer excision; Insertion of tunneled central venous catheter (CVC) with subcutaneous port (N/A, 12/7/2018); and Colonoscopy (N/A, 5/21/2019).    Social History: Patient reports that he has never smoked. He has never used smokeless tobacco. He reports that he does not drink alcohol or use drugs.    Family History: family history includes Cancer in his father; Cataracts in his mother; Diabetes in his maternal aunt; Heart disease in his mother; Heart failure in his mother; Hypertension in his mother; Rheum arthritis in his paternal grandmother; Stroke in his sister.    Medications: reviewed     Allergies: Patient has No Known Allergies.    ROS  Pain Scale: 0 /10   Constitutional: no fever or chills  Respiratory: no cough or shortness of breath  Cardiovascular: no chest pain or palpitations  Gastrointestinal: no nausea or vomiting, no abdominal  pain or change in bowel habits  Genitourinary: no hematuria or dysuria  Integument/Breast: no rash or pruritis  Hematologic/Lymphatic: no easy bruising or lymphadenopathy  Musculoskeletal: no arthralgias or myalgias  Neurological: no seizures or tremors  Behavioral/Psych: Positive for mild confusion    PEx  Temp:  [98.4 °F (36.9 °C)-100.5 °F (38.1 °C)]   Pulse:  []   Resp:  [16-18]   BP: (125-141)/(59-85)   SpO2:  [99 %-100 %]   Body mass index is 32.54 kg/m².     Intake/Output Summary (Last 24 hours) at 12/3/2019 2345  Last data filed at 12/3/2019 2333  Gross per 24 hour   Intake 3179 ml   Output 100 ml   Net 3079 ml       General appearance: no distress, pt. Resting comfortably  Mental status: Alert and oriented x 3  HEENT:  conjunctivae/corneas clear, PERRL  Neck: supple, thyroid not enlarged  Pulm:   normal respiratory effort, CTA B, no c/w/r  Card: RRR, S1, S2 normal, no murmur, click, rub or gallop  Abd: soft, NT, ND, BS present; no masses, no organomegaly  Ext: no c/c/e  Pulses: 2+, symmetric  Skin: Moist, erythematous rash in groin region  Neuro: CN II-XII grossly intact, no focal numbness or weakness, normal strength and tone     Recent Results (from the past 24 hour(s))   CBC auto differential    Collection Time: 12/03/19  3:47 PM   Result Value Ref Range    WBC 21.48 (H) 3.90 - 12.70 K/uL    RBC 3.83 (L) 4.60 - 6.20 M/uL    Hemoglobin 11.2 (L) 14.0 - 18.0 g/dL    Hematocrit 33.6 (L) 40.0 - 54.0 %    Mean Corpuscular Volume 88 82 - 98 fL    Mean Corpuscular Hemoglobin 29.2 27.0 - 31.0 pg    Mean Corpuscular Hemoglobin Conc 33.3 32.0 - 36.0 g/dL    RDW 15.0 (H) 11.5 - 14.5 %    Platelets 139 (L) 150 - 350 K/uL    MPV 9.6 9.2 - 12.9 fL    Immature Granulocytes 0.3 0.0 - 0.5 %    Gran # (ANC) 18.6 (H) 1.8 - 7.7 K/uL    Immature Grans (Abs) 0.07 (H) 0.00 - 0.04 K/uL    Lymph # 0.8 (L) 1.0 - 4.8 K/uL    Mono # 2.0 (H) 0.3 - 1.0 K/uL    Eos # 0.0 0.0 - 0.5 K/uL    Baso # 0.03 0.00 - 0.20 K/uL    nRBC 0 0  /100 WBC    Gran% 86.8 (H) 38.0 - 73.0 %    Lymph% 3.5 (L) 18.0 - 48.0 %    Mono% 9.3 4.0 - 15.0 %    Eosinophil% 0.0 0.0 - 8.0 %    Basophil% 0.1 0.0 - 1.9 %    Aniso Slight     Poik Slight     Ovalocytes Occasional     Differential Method Automated    Comprehensive metabolic panel    Collection Time: 12/03/19  3:47 PM   Result Value Ref Range    Sodium 137 136 - 145 mmol/L    Potassium 4.3 3.5 - 5.1 mmol/L    Chloride 103 95 - 110 mmol/L    CO2 23 23 - 29 mmol/L    Glucose 271 (H) 70 - 110 mg/dL    BUN, Bld 28 (H) 8 - 23 mg/dL    Creatinine 1.6 (H) 0.5 - 1.4 mg/dL    Calcium 9.6 8.7 - 10.5 mg/dL    Total Protein 7.2 6.0 - 8.4 g/dL    Albumin 3.7 3.5 - 5.2 g/dL    Total Bilirubin 1.3 (H) 0.1 - 1.0 mg/dL    Alkaline Phosphatase 84 55 - 135 U/L    AST 20 10 - 40 U/L    ALT 16 10 - 44 U/L    Anion Gap 11 8 - 16 mmol/L    eGFR if African American 53.0 (A) >60 mL/min/1.73 m^2    eGFR if non  45.8 (A) >60 mL/min/1.73 m^2   CBC auto differential    Collection Time: 12/03/19  7:37 PM   Result Value Ref Range    WBC 20.10 (H) 3.90 - 12.70 K/uL    RBC 3.71 (L) 4.60 - 6.20 M/uL    Hemoglobin 10.8 (L) 14.0 - 18.0 g/dL    Hematocrit 32.3 (L) 40.0 - 54.0 %    Mean Corpuscular Volume 87 82 - 98 fL    Mean Corpuscular Hemoglobin 29.1 27.0 - 31.0 pg    Mean Corpuscular Hemoglobin Conc 33.4 32.0 - 36.0 g/dL    RDW 15.0 (H) 11.5 - 14.5 %    Platelets 135 (L) 150 - 350 K/uL    MPV 9.8 9.2 - 12.9 fL    Immature Granulocytes 0.2 0.0 - 0.5 %    Gran # (ANC) 17.2 (H) 1.8 - 7.7 K/uL    Immature Grans (Abs) 0.05 (H) 0.00 - 0.04 K/uL    Lymph # 1.0 1.0 - 4.8 K/uL    Mono # 1.8 (H) 0.3 - 1.0 K/uL    Eos # 0.0 0.0 - 0.5 K/uL    Baso # 0.02 0.00 - 0.20 K/uL    nRBC 0 0 /100 WBC    Gran% 85.6 (H) 38.0 - 73.0 %    Lymph% 5.1 (L) 18.0 - 48.0 %    Mono% 9.0 4.0 - 15.0 %    Eosinophil% 0.0 0.0 - 8.0 %    Basophil% 0.1 0.0 - 1.9 %    Differential Method Automated    Comprehensive metabolic panel    Collection Time: 12/03/19  7:37 PM    Result Value Ref Range    Sodium 135 (L) 136 - 145 mmol/L    Potassium 3.8 3.5 - 5.1 mmol/L    Chloride 102 95 - 110 mmol/L    CO2 24 23 - 29 mmol/L    Glucose 216 (H) 70 - 110 mg/dL    BUN, Bld 32 (H) 8 - 23 mg/dL    Creatinine 1.7 (H) 0.5 - 1.4 mg/dL    Calcium 9.9 8.7 - 10.5 mg/dL    Total Protein 7.0 6.0 - 8.4 g/dL    Albumin 3.7 3.5 - 5.2 g/dL    Total Bilirubin 1.3 (H) 0.1 - 1.0 mg/dL    Alkaline Phosphatase 81 55 - 135 U/L    AST 20 10 - 40 U/L    ALT 15 10 - 44 U/L    Anion Gap 9 8 - 16 mmol/L    eGFR if African American 49.2 (A) >60 mL/min/1.73 m^2    eGFR if non  42.6 (A) >60 mL/min/1.73 m^2   Lactic acid, plasma #1    Collection Time: 12/03/19  7:37 PM   Result Value Ref Range    Lactate (Lactic Acid) 2.8 (H) 0.5 - 2.2 mmol/L   Influenza A & B by Molecular    Collection Time: 12/03/19  7:58 PM   Result Value Ref Range    Influenza A, Molecular Negative Negative    Influenza B, Molecular Negative Negative    Flu A & B Source Nasal swab    Urinalysis, Reflex to Urine Culture Urine, Clean Catch    Collection Time: 12/03/19  8:51 PM   Result Value Ref Range    Specimen UA Urine, Catheterized     Color, UA Beti Yellow, Straw, Beti    Appearance, UA Cloudy (A) Clear    pH, UA 5.0 5.0 - 8.0    Specific Gravity, UA 1.020 1.005 - 1.030    Protein, UA 2+ (A) Negative    Glucose, UA Negative Negative    Ketones, UA Negative Negative    Bilirubin (UA) Negative Negative    Occult Blood UA 3+ (A) Negative    Nitrite, UA Negative Negative    Leukocytes, UA 2+ (A) Negative   Urinalysis Microscopic    Collection Time: 12/03/19  8:51 PM   Result Value Ref Range    RBC, UA 34 (H) 0 - 4 /hpf    WBC, UA 15 (H) 0 - 5 /hpf    Bacteria Occasional None-Occ /hpf    Hyaline Casts, UA 17 (A) 0-1/lpf /lpf    Microscopic Comment SEE COMMENT    Gram stain    Collection Time: 12/03/19  8:51 PM   Result Value Ref Range    Gram Stain Result Rare WBC's     Gram Stain Result No organisms seen        No results for  input(s): POCTGLUCOSE in the last 168 hours.    Active Hospital Problems    Diagnosis  POA    Sepsis due to urinary tract infection [A41.9, N39.0]  Yes      Resolved Hospital Problems   No resolved problems to display.     Assessment and Plan:  Severe Sepsis 2/2 UTI  Acute Encephalopathy, Improved  -Pt. With low grade fever, WBC 20, and lactate elevated to 2.8  -CXR without consolidation, no URI symptoms  -U/A with 15 WBC, LE +, but nitrite negative. Possible source  -F/U urine and blood cultures  -IV fluids given with normalization of lactate, continue with IV NS at 100 cc/hr  -Start zosyn for broad spectrum control and deescalate once clear source known    CNS Vasculitis  -Pt. Followed outpatient by neurology, continue cytoxan monthly  -Continue meds donepezil, sertraline, seroquel, and methylphenidate for mentation    Intertrigo  -Rash noted in folds of groin, will treat with topical miconazole  -Monitor     MANSIH  -Baseline Cr ~1.0, pt. Presents with Cr 1.7  -Suspect prerenal source in setting of sepsis  -Urine electrolytes ordered  -Hold neprohotoxic meds such as ARB irbesartan  -IV fluids started, monitor with daily BMP    DM2  - Last HbA1c: 6.2 on 4/2019  - Diabetic diet with SSI insulin ordered  -Lantus 40 units BID listed as home dose, pt. Reports only being on 40 unitsdaily and wife states he misses that dose sometimes  -Levemir 20 units for now, uptitrate as needed     Hypertension:  - continue atenolol, diltiazem. Hold irbesartan in setting of MANISH     Dyslipidemia:  - continue atorvastatin     Hx of CVA:  - continue asa and atorvastatin    DVT PPx: Heparin      Kiko Carter MD  Hospital Medicine Staff  [phone] pager

## 2019-12-04 NOTE — ED PROVIDER NOTES
Encounter Date: 12/3/2019       History     Chief Complaint   Patient presents with    Abnormal Lab     very high wbc 21k, hx MS, + uti     7:34 PM.   Patient is a 59 year old male with a history of DM, CNS vasculitis who presents the ED after being referred by Neurology for leukocytosis in further workup.  Patient saw his neurologist today and had complaints.  CBC and CMP obtain which revealed leukocytosis and mild renal insufficiency.  Patient and his wife were referred to the ED.  Patient endorses chronic fatigue that has worsened the past 2-3 days.  His wife has noted urinary incontinence for the past week.  She notes that he has been more confused lately than usual from his baseline with hx of CNS vasculitis.  He denies any chills, diaphoresis, body aches, sore throat, cough, chest pain, shortness of breath, abdominal pain, dysuria, vomiting, or recent diarrhea.  He denies any recent sick contacts and wife states that they mainly stay at home.        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 Love By mri.  Several active lesions, many old. 5/13: MRA brain/neck, MRI brain w/wo contrast show no vascular occlusion, multiple foci of hyperintensity in deep cerebral periventricular white matter in pattern of demyelinating process.  5/17: MRI spine performed, no spinal cord lesions. Hospitalization 6/2017. EEG was performed negative for seizures.  Repeat MRI showing new lesion, question whether this was demyelination versus CVA. 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 Findings compatible with a small acute lacunar infarct adjacent to the left frontal horn.   Nonspecific enhancement just inferior to this region and extending into the left basal ganglia, as well as within the inferior aspect of the left cerebellum.  No evidence of a focal mass. 2.  Extensive increased signal intensity involving the periventricular white matter compatible with demyelinating disease versus vasculitis. 3.  Clinical correlation and followup recommended. 4.  This reportedly flattened in the EPIC and the corpus callosum medical record system.    Mixed hyperlipidemia 11/9/2012    NAFLD (nonalcoholic fatty liver disease) 10/11/2013    CHASE (nonalcoholic steatohepatitis)     Obesity     Stroke     Type 2 diabetes mellitus with diabetic polyneuropathy, with long-term current use of insulin 5/14/2017    Type 2 diabetes mellitus with hyperglycemia, with long-term current use of insulin 10/11/2013     Past Surgical History:   Procedure Laterality Date    COLONOSCOPY N/A 5/21/2019    Procedure: COLONOSCOPY;  Surgeon: Alex Ascencio MD;  Location: [facility];  Service: Endoscopy;  Laterality: N/A;  confirmed appt-sp    INSERTION OF TUNNELED CENTRAL VENOUS CATHETER (CVC) WITH SUBCUTANEOUS PORT N/A 12/7/2018    Procedure: [id number];  Surgeon: Luan Diagnostic Provider;  Location: [facility] OR [address] [place] [address];  Service: Radiology;  Laterality: N/A;    SKIN CANCER EXCISION       Family History   Problem Relation Age of Onset    Heart disease Mother     Hypertension Mother     Heart failure Mother     Cataracts Mother     Cancer Father         lung    Diabetes Maternal Aunt     Stroke Sister     Rheum arthritis Paternal Grandmother     Amblyopia Neg Hx     Blindness Neg Hx     Glaucoma Neg Hx     Macular degeneration Neg Hx     Retinal detachment Neg Hx     Strabismus Neg Hx      Social History     Tobacco Use    Smoking status: Never Smoker    Smokeless tobacco: Never Used   Substance Use Topics    Alcohol use: No     Alcohol/week: 0.0 standard drinks     Frequency: Never     Drinks  per session: 1 or 2     Binge frequency: Never    Drug use: No     Review of Systems   Constitutional: Positive for fatigue. Negative for chills.   HENT: Negative for sore throat.    Respiratory: Negative for cough and shortness of breath.    Cardiovascular: Negative for chest pain.   Gastrointestinal: Negative for nausea.   Genitourinary: Negative for dysuria.        +urinary incontinence   Musculoskeletal: Negative for back pain.   Skin: Negative for rash.   Neurological: Negative for weakness.   Hematological: Does not bruise/bleed easily.   Psychiatric/Behavioral: Positive for confusion.       Physical Exam     Initial Vitals [12/03/19 1850]   BP Pulse Resp Temp SpO2   125/71 100 18 (!) 100.5 °F (38.1 °C) 100 %      MAP       --         Physical Exam    Vitals reviewed.  Constitutional: He appears well-developed and well-nourished. He is not diaphoretic. No distress.   Smells of urine.   HENT:   Head: Normocephalic and atraumatic.   Nose: Nose normal.   Eyes: Conjunctivae and EOM are normal.   Neck: Normal range of motion.   Cardiovascular: Normal rate.   Pulmonary/Chest: Breath sounds normal. No respiratory distress. He has no wheezes. He has no rales.   Abdominal: Soft. Bowel sounds are normal. He exhibits no distension. There is no tenderness. There is no rigidity, no rebound and no guarding.   Musculoskeletal: Normal range of motion.   Neurological: He is alert. He has normal strength.   Alert to person, but not year (2020) and place (psychiatric dept.). Answering all questions and following commands.   Skin: Skin is warm and dry. No erythema.   Psychiatric: He has a normal mood and affect. Thought content normal.         ED Course   Procedures  Labs Reviewed   CBC W/ AUTO DIFFERENTIAL - Abnormal; Notable for the following components:       Result Value    WBC 20.10 (*)     RBC 3.71 (*)     Hemoglobin 10.8 (*)     Hematocrit 32.3 (*)     RDW 15.0 (*)     Platelets 135 (*)     Gran # (ANC) 17.2 (*)      Immature Grans (Abs) 0.05 (*)     Mono # 1.8 (*)     Gran% 85.6 (*)     Lymph% 5.1 (*)     All other components within normal limits   COMPREHENSIVE METABOLIC PANEL - Abnormal; Notable for the following components:    Sodium 135 (*)     Glucose 216 (*)     BUN, Bld 32 (*)     Creatinine 1.7 (*)     Total Bilirubin 1.3 (*)     eGFR if  49.2 (*)     eGFR if non  42.6 (*)     All other components within normal limits   LACTIC ACID, PLASMA - Abnormal; Notable for the following components:    Lactate (Lactic Acid) 2.8 (*)     All other components within normal limits   URINALYSIS, REFLEX TO URINE CULTURE - Abnormal; Notable for the following components:    Appearance, UA Cloudy (*)     Protein, UA 2+ (*)     Occult Blood UA 3+ (*)     Leukocytes, UA 2+ (*)     All other components within normal limits    Narrative:     Preferred Collection Type->Urine, Clean Catch   URINALYSIS MICROSCOPIC - Abnormal; Notable for the following components:    RBC, UA 34 (*)     WBC, UA 15 (*)     Hyaline Casts, UA 17 (*)     All other components within normal limits    Narrative:     Preferred Collection Type->Urine, Clean Catch   INFLUENZA A & B BY MOLECULAR   GRAM STAIN   CULTURE, BLOOD   CULTURE, BLOOD   CULTURE, URINE   GRAM STAIN   LACTIC ACID, PLASMA          Imaging Results          X-Ray Chest PA And Lateral (Final result)  Result time 12/03/19 20:22:00    Final result by Seth Suarez MD (12/03/19 20:22:00)                 Impression:      No acute findings in the chest.      Electronically signed by: Seth Suarez MD  Date:    12/03/2019  Time:    20:22             Narrative:    EXAMINATION:  XR CHEST PA AND LATERAL    CLINICAL HISTORY:  Fever, unspecified    TECHNIQUE:  PA and lateral views of the chest were performed.    COMPARISON:  January 17, 2018.    FINDINGS:  Port catheter tip overlies the SVC.    There is no consolidation, effusion, or pneumothorax.    Cardiomediastinal silhouette  is unremarkable.    Regional osseous structures are unchanged.                                 Medical Decision Making:   History:   Old Medical Records: I decided to obtain old medical records.  Old Records Summarized: records from clinic visits.  Initial Assessment:   Patient is a 59 year old male with a history of DM, CNS vasculitis who presents the ED after being referred by Neurology for leukocytosis in further workup. Has been having urinary incontinence for 1 week and increase fatigue in 2-3 days.   Differential Diagnosis:   Includes but is not limited to UTI, pneumonia, influenza, other viral syndrome, bacteremia, sepsis, kidney injury, electrolyte abnormalities.  Clinical Tests:   Lab Tests: Ordered and Reviewed  Radiological Study: Ordered and Reviewed  ED Management:  Patient febrile and tachycardic, however he is not septic appearing.  Will initiate septic workup and continue to monitor.    CBC with leukocytosis at 20 with left shift at 85.6%.  H/H 11/32.  CMP with elevated BUN and creatinine at 32/1.7, up from baseline.  No transaminitis.  Lactic acid mildly elevated at 2.8.  Will trend.  UA with infection.  Negative nitrite.  Urine culture pending.  Chest x-ray with no acute findings.    Negative influenza.    Blood cultures pending.    Given history, physical exam, and workup today, patient be admitted to Hospital Medicine for urosepsis.  His vitals are improving.  He remains in NAD and nontoxic.  Family updated with labs.  All questions were answered.  Case discussed with Hospital Medicine who recommends adding on gram stain and Zosyn.  Other:   I have discussed this case with another health care provider.    I have reviewed patient's chart and discussed this case with my supervising MD.     Peyton Nolasco PA-C  Emergent Department  [facility]  Spectralink #[id number] or #[phone]                                   Clinical Impression:       ICD-10-CM ICD-9-CM   1. Sepsis due to urinary tract  infection A41.9 038.9    N39.0 995.91     599.0   2. Fever R50.9 780.60   3. Renal insufficiency N28.9 593.9         Disposition:   Disposition: Admitted  Condition: Stable                     Peyton Nolasco PA-C  12/03/19 [id number]

## 2019-12-04 NOTE — PT/OT/SLP EVAL
"Occupational Therapy   Evaluation    Name: Bessy Nunez  MRN: [id number]  Admitting Diagnosis:  Severe sepsis      Recommendations:     Discharge Recommendations: nursing facility, skilled  Discharge Equipment Recommendations:  walker, rolling  Barriers to discharge:  None    Assessment:     Bessy Nunez is a 59 y.o. male with a medical diagnosis of Severe sepsis.  He presents with impaired ADL and fx'l mobility performance deficits. Pt's spouse present and assisted therapy team in obtaining occupational profile and pt orinetedx2. Pt lives with spouse in [facility]. Per wife, pt requiring increased (A) as of last day or so with ADLs and mobility, explaining "I'm not sure what happened but before yesterday he was completely fine doing everything."   During OT/PT eval, pt demo max (A) with bed mobility and sit<Stand with max (a) on first attempt using HH. Pt then agreeable to use RW to which he required min (A) Pt then required frequent vc's with walker management and feet placement. Postural swaying noted. Performance deficits affecting function: weakness, impaired endurance, impaired self care skills, impaired functional mobilty, gait instability, impaired balance, decreased safety awareness, impaired cognition, decreased coordination, decreased lower extremity function. Pt would benefit from continued OT skilled services 3x/wk to improve daily living skills to optimize QOL. Pt is recommended to discharge to SNF at this time.      Rehab Prognosis: Good; patient would benefit from acute skilled OT services to address these deficits and reach maximum level of function.       Plan:     Patient to be seen 3 x/week to address the above listed problems via self-care/home management, therapeutic activities, therapeutic exercises, cognitive retraining, neuromuscular re-education  · Plan of Care Expires: 01/04/20  · Plan of Care Reviewed with: patient, spouse    Subjective     Chief Complaint: None stated   Patient/Family " "Comments/goals: "I know we are at the [facility]"= per pt when answering orientation questions    Occupational Profile:  Living Environment: Pt lives with wife in 2 story home with bedroom on first level. Pt with bath bench for bathing.   Previous level of function: (I) with ADLs and mobility   Roles and Routines: None stated   Equipment Used at Home:  bath bench  Assistance upon Discharge: Spouse as needed     Pain/Comfort:  · Pain Rating 1: 0/10  · Pain Rating 2: 0/10    Patients cultural, spiritual, Advent conflicts given the current situation: no    Objective:     Communicated with: RN prior to session.  Patient found HOB elevated with bed alarm, peripheral IV upon OT entry to room.    General Precautions: Standard, fall   Orthopedic Precautions:N/A   Braces: N/A     Occupational Performance:    Bed Mobility:    · Patient completed Rolling/Turning to Right with maximal assistance  · Patient completed Scooting/Bridging with maximal assistance  · Patient completed Supine to Sit with maximal assistance  · Patient completed Sit to Supine with maximal assistance    Functional Mobility/Transfers:  · Patient completed Sit <> Stand Transfer with maximal assistance  with  hand-held assist   · Patient completed Bed <> Chair Transfer using Step Transfer technique with moderate assistance with rolling walker  · Functional Mobility: Pt initially used HHA with max (A) then progressed to using RW with min (A) and max vc's.    Activities of Daily Living:  · Lower Body Dressing: total assistance donning socks at EOB     Cognitive/Visual Perceptual:  Cognitive/Psychosocial Skills:     -       Oriented to: Person and Place   -       Follows Commands/attention:Easily distracted and Follows one-step commands  -       Communication: clear/fluent  -       Memory: Impaired LTM and Poor immediate recall  -       Safety awareness/insight to disability: impaired   -       Mood/Affect/Coping skills/emotional control: " Appropriate to situation    Physical Exam:  Balance:    -       Demo good sitting balance using BL supports, fair standing balance with RW.  Upper Extremity Range of Motion:     -       Right Upper Extremity: WFL  -       Left Upper Extremity: WFL  Upper Extremity Strength:    -       Right Upper Extremity: WFL  -       Left Upper Extremity: WFL   Strength:    -       Right Upper Extremity: WFL  -       Left Upper Extremity: WFL    AMPAC 6 Click ADL:  AMPAC Total Score: 13    Treatment & Education:  Pt educated on role of occupational therapy, POC, and safety during ADLs and functional mobility. Pt and OT discussed importance of safe, continued mobility to optimize daily living skills. Pt verbalized understanding. White board updated during session. Pt given instruction to call for medical staff/nurse for assistance.     Education:    Patient left up in chair with all lines intact, call button in reach, RN notified and spouse present    GOALS:   Multidisciplinary Problems     Occupational Therapy Goals        Problem: Occupational Therapy Goal    Goal Priority Disciplines Outcome Interventions   Occupational Therapy Goal     OT, PT/OT Ongoing, Progressing    Description:  Goals to be met by: 12/12/19     Patient will increase functional independence with ADLs by performing:    UE Dressing with Stand-by Assistance.  LE Dressing with Moderate Assistance.  Grooming while standing with Moderate Assistance.  Toileting from toilet with Minimal Assistance for hygiene and clothing management.   Rolling to Bilateral with Moderate Assistance.   Supine to sit with Moderate Assistance.  Step transfer with Contact Guard Assistance  Toilet transfer to toilet with Contact Guard Assistance.                      History:     Past Medical History:   Diagnosis Date    Amblyopia     Cataract     CNS vasculitis 6/2/2017    Follows with Dr. Love By mri.  Several active lesions, many old. 5/13: MRA brain/neck, MRI brain w/wo  contrast show no vascular occlusion, multiple foci of hyperintensity in deep cerebral periventricular white matter in pattern of demyelinating process.  5/17: MRI spine performed, no spinal cord lesions. Hospitalization 6/2017. EEG was performed negative for seizures.  Repeat MRI showing new lesion, question whether this was demyelination versus CVA. 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 Findings compatible with a small acute lacunar infarct adjacent to the left frontal horn.  Nonspecific enhancement just inferior to this region and extending into the left basal ganglia, as well as within the inferior aspect of the left cerebellum.  No evidence of a focal mass. 2.  Extensive increased signal intensity involving the periventricular white matter compatible with demyelinating disease versus vasculitis. 3.  Clinical correlation and followup recommended. 4.  This reportedly flattened in the EPIC and the corpus callosum medical record system.    Mixed hyperlipidemia 11/9/2012    NAFLD (nonalcoholic fatty liver disease) 10/11/2013    CHASE (nonalcoholic steatohepatitis)     Obesity     Stroke     Type 2 diabetes mellitus with diabetic polyneuropathy, with long-term current use of insulin 5/14/2017    Type 2 diabetes mellitus with hyperglycemia, with long-term current use of insulin 10/11/2013       Past Surgical History:   Procedure Laterality Date    COLONOSCOPY N/A 5/21/2019    Procedure: COLONOSCOPY;  Surgeon: Alex Ascencio MD;  Location: [facility];  Service: Endoscopy;  Laterality: N/A;  confirmed appt-sp    INSERTION OF TUNNELED CENTRAL VENOUS CATHETER (CVC) WITH SUBCUTANEOUS PORT N/A 12/7/2018    Procedure: [id number];  Surgeon: Luan Diagnostic Provider;  [facility];  Service: Radiology;  Laterality: N/A;    SKIN CANCER EXCISION         Time Tracking:     OT Date of Treatment: 12/04/19  OT Start Time: 0850  OT Stop Time: 0914  OT Total Time (min): 24 min    Billable Minutes:Evaluation 14 min  Self Care/Home Management 10 min    Althea Ramos OT  12/4/2019

## 2019-12-04 NOTE — ED NOTES
Pt is resting in hallway with no complaints. He is aware of plan of care. Will continue to monitor.

## 2019-12-04 NOTE — PT/OT/SLP EVAL
Physical Therapy Evaluation    Patient Name:  Bessy Nunez   MRN:  [id number]  Admit Date: 12/3/2019  Admitting Diagnosis:  Severe sepsis   Length of Stay: 1 days  Recent Surgery: * No surgery found *      Recommendations:     Discharge Recommendations:  nursing facility, skilled   Discharge Equipment Recommendations: walker, rolling   Barriers to discharge: None    Assessment:     Bessy Nunez is a 59 y.o. male admitted with a medical diagnosis of Severe sepsis.  He presents with the following impairments/functional limitations: impaired functional mobilty, weakness, impaired endurance, impaired self care skills, gait instability, impaired balance, impaired cognition, decreased lower extremity function, decreased upper extremity function, decreased safety awareness, impaired coordination, impaired fine motor. Pt tolerated initial evaluation well today. Pt's wife, who is his primary caregiver, reported that the pt's performance today is well below pt's funcitonal baseline. Pt demonstrated increased postural sway and difficulties remaining in vertical upright in sitting. Furthermore, pt with poor motor planning for ambulation and unable to sequence steps requiring both verbal and visual cues in order to attempt steps to chair. Pt demonstrated improved standing balance and transfer ability with RW, most likely due to AD allowing pt to offload LE. Pt will benefit from SNF after discharge from acute services in order to continue to progress pt's strength, endurance, and balance to help pt maximize independence at or near OF.    Rehab Prognosis: Good; patient would benefit from acute skilled PT services to address these deficits and reach maximum level of function.      Plan:     During this hospitalization, patient to be seen 3 x/week to address the identified rehab impairments via gait training, therapeutic activities, therapeutic exercises, neuromuscular re-education and progress towards the established  "goals.    · Plan of Care Expires:  01/01/20    Subjective     RN notified prior to session. Wife present upon PT entrance into room.    Chief Complaint: "[place] are at the [facility] right now"  Patient/Family Comments/goals: have pt get stronger so he can return home  Pain/Comfort:  · Pain Rating 1: 0/10  · Pain Rating Post-Intervention 1: 0/10    Due to pt's confusion wife assisted with information on home environment and PLOF    Living Environment:  Patient lives with wife in a two story home with 0 AMANDA and bedroom/bathroom on 1st floor.   Prior Level of Function: Patient reports being Independent with mobility & with ADLs. Patient uses DME as follows: bath bench. DME owned (not currently used): none.  Roles/Repsonsibilities: Work: yes.  Managing Medicines/Managing Home: yes-with some assist from wife for IADLs.     Patient will have assistance from wife upon discharge.    Objective:     Additional staff present: OT for co-evaluation    Patient found HOB elevated with: bed alarm, telemetry, peripheral IV     General Precautions: Standard, Cardiac fall   Orthopedic Precautions:N/A   Braces: N/A   Body mass index is 32.54 kg/m².  Oxygen Device: Room Air    Exams:  · Mental Status: Patient is AxOx2 (self and situation) and follows all single-step verbal commands. Pt is Alert and Cooperative during session.  · Skin Integrity: Visible skin intact  · Edema: None noted   · Sensation: LEVI due to pt's decreased mental status  · Hearing: Intact  · Vision:  Intact  · Postural Assessment: slouched posture and rounded shoulders  · Range of Motion:  · RUE: WFL  · LUE: WFL  · RLE: WFL  · LLE: WFL  · Strength Exam:  · Lower Extremity Strength  Right LE  Left LE    Knee extension: 4+/5 Knee extension: 4+/5   Knee flexion: 4/5 Knee flexion: 4/5   Hip flexion: 3+/5 Hip flexion: 3+/5   Ankle dorsiflexion:   4/5 Ankle dorsiflexion:   4/5   Ankle plantarflexion: 4/5 Ankle plantarflexion: 4/5       Outcome Measures:  AM-PAC 6 CLICK " MOBILITY  Turning over in bed (including adjusting bedclothes, sheets and blankets)?: 2  Sitting down on and standing up from a chair with arms (e.g., wheelchair, bedside commode, etc.): 3  Moving from lying on back to sitting on the side of the bed?: 2  Moving to and from a bed to a chair (including a wheelchair)?: 3  Need to walk in hospital room?: 3  Climbing 3-5 steps with a railing?: 2  Basic Mobility Total Score: 15     Functional Mobility:    Bed Mobility:   · Rolling/Turning to Right: maximal assistance  · Scooting to HOB via supine bridge: maximal assistance  · Supine to Sit: maximal assistance; from Rt side of bed  · Scooting anteriorly to EOB to have both feet planted on floor: maximal assistance    Sitting Balance at Edge of Bed:   Assistance Level Required: Contact Guard Assistance   Time: 5 minutes    Transfers:   · Sit <> Stand Transfer: maximal assistance with hand-held assist   · Stand <> Sit Transfer: maximal assistance with hand-held assist   · [id number] from EOB   · Sit <> Stand Transfer: minimum assistance with rolling walker   · Stand <> Sit Transfer: minimum assistance with rolling walker   · x1 trials from EOB  · Bed <> Chair Transfer: Step Transfer technique with moderate assistance with rolling walker  · Chair on patient's Rt  · Mod A for guiding hips to safely sit in chair    Standing Balance:   Assistance Level Required: Contact Guard Assistance   Patient used: rolling walker       Gait:   · Patient ambulated: approx 6 steps   · Patient required: moderate assist  · Patient used:  rolling walker   · Gait Pattern observed: 3-point gait  · Gait Deviation(s): unsteady gait, decreased step length, wide base of support, decreased weight shift, shuffle gait and flexed posture  · Impairments due to: impaired balance and impaired motor control  · all lines remained intact throughout ambulation trial  · Comments: Pt unsteady throughout. Pt req'd Mod A for weight shifting to take step. Pt req'd  verbal cues (saying big step for each step) as well as visual cues (step over the line) in order to initiate proper step. Otherwise pt not able to forward progress step.    Stairs:   Deferred due to pt's performance with above listed functional mobility    Education:   Time provided for education, counseling and discussion of health disposition in regards to patient's current status   All questions answered within PT scope of practice and to patient's satisfaction   PT role in POC to address current functional deficits   Pt educated on proper body mechanics, safety techniques, and energy conservation with PT facilitation and cueing throughout session   Call nursing/pct to transfer to chair/use bathroom. Pt stated understanding.   RW ordered for in room use with nursing staff   Whiteboard updated with pt's current mobility status documented above   Safe to perform step transfer to/from chair/bedside commode Mod A and RW w/ nursing/PCT present    Wife instructed to notify nursing before leaving the room so that the pt can be safely returned to bed    Patient left up in chair with all lines intact, call button in reach and wife present.    GOALS:   Multidisciplinary Problems     Physical Therapy Goals        Problem: Physical Therapy Goal    Goal Priority Disciplines Outcome Goal Variances Interventions   Physical Therapy Goal     PT, PT/OT Ongoing, Progressing     Description:  Goals to be met by: 19     Patient will increase functional independence with mobility by performin. Supine to sit with MInimal Assistance  2. Sit to supine with MInimal Assistance  3. Sit to stand transfer with Contact Guard Assistance with LRAD or no AD  4. Bed to chair transfer with Contact Guard Assistance using LRAD or no AD  5. Gait  x 25 feet with Contact Guard Assistance using LRAD or no AD.   6. Lower extremity exercise program x20 reps per handout, with assistance as needed  7. Stand for 1 minutes with Contact  Guard Assistance with no AD to improve balance for standing ADLs                      History:     Past Medical History:   Diagnosis Date    Amblyopia     Cataract     CNS vasculitis 6/2/2017    Follows with Dr. Love By mri.  Several active lesions, many old. 5/13: MRA brain/neck, MRI brain w/wo contrast show no vascular occlusion, multiple foci of hyperintensity in deep cerebral periventricular white matter in pattern of demyelinating process.  5/17: MRI spine performed, no spinal cord lesions. Hospitalization 6/2017. EEG was performed negative for seizures.  Repeat MRI showing new lesion, question whether this was demyelination versus CVA. 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 Findings compatible with a small acute lacunar infarct adjacent to the left frontal horn.  Nonspecific enhancement just inferior to this region and extending into the left basal ganglia, as well as within the inferior aspect of the left cerebellum.  No evidence of a focal mass. 2.  Extensive increased signal intensity involving the periventricular white matter compatible with demyelinating disease versus vasculitis. 3.  Clinical correlation and followup recommended. 4.  This reportedly flattened in the EPIC and the corpus callosum medical record system.    Mixed hyperlipidemia 11/9/2012    NAFLD (nonalcoholic fatty liver disease) 10/11/2013    CHASE (nonalcoholic steatohepatitis)     Obesity     Stroke     Type 2 diabetes mellitus with diabetic polyneuropathy, with long-term current use of insulin 5/14/2017    Type 2 diabetes mellitus with hyperglycemia, with long-term current use of insulin 10/11/2013       Past Surgical History:   Procedure Laterality Date    COLONOSCOPY N/A 5/21/2019    Procedure:  COLONOSCOPY;  Surgeon: Alex Ascencio MD;  Location: [facility];  Service: Endoscopy;  Laterality: N/A;  confirmed appt-sp    INSERTION OF TUNNELED CENTRAL VENOUS CATHETER (CVC) WITH SUBCUTANEOUS PORT N/A 12/7/2018    Procedure: [id number];  Surgeon: Luan Diagnostic Provider;  Location: [facility] OR [address] [place] [address];  Service: Radiology;  Laterality: N/A;    SKIN CANCER EXCISION         Time Tracking:     PT Received On: 12/04/19  PT Start Time: 0850     PT Stop Time: 0914  PT Total Time (min): 24 min     Billable Minutes: Evaluation 14 and Therapeutic Activity 10    Ceci Vilchis PT, DPT  12/4/2019  Pager: [phone]

## 2019-12-04 NOTE — CARE UPDATE
Patient arrived to MRI in [facility] and placed on MRI safe monitor. War room notified.

## 2019-12-04 NOTE — ED TRIAGE NOTES
"Bessy Nunez, a 59 y.o. male presents to the ED     Triage note:  Chief Complaint   Patient presents with    Abnormal Lab     very high wbc 21k, hx MS, + uti     Pt has wife at bedside. She states patient has been slightly confused. Pt states it is "2020" and he is in a "psych collado" currently. He denies any pain. Wife states he has been having urinary incontinence. Pt has foul/ammonia smell.     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 Love By mri.  Several active lesions, many old. 5/13: MRA brain/neck, MRI brain w/wo contrast show no vascular occlusion, multiple foci of hyperintensity in deep cerebral periventricular white matter in pattern of demyelinating process.  5/17: MRI spine performed, no spinal cord lesions. Hospitalization 6/2017. EEG was performed negative for seizures.  Repeat MRI showing new lesion, question whether this was demyelination versus CVA. 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 Findings compatible with a small acute lacunar infarct adjacent to the left frontal horn.  Nonspecific enhancement just inferior to this region and extending into the left basal ganglia, as well as within the inferior aspect of the left cerebellum.  No evidence of a focal mass. 2.  Extensive increased signal intensity involving the periventricular white matter compatible with demyelinating disease versus vasculitis. 3.  Clinical correlation and followup recommended. 4.  This reportedly flattened in the EPIC and the corpus callosum medical record system.    Mixed hyperlipidemia 11/9/2012    NAFLD (nonalcoholic fatty liver disease) 10/11/2013    CHASE " (nonalcoholic steatohepatitis)     Obesity     Stroke     Type 2 diabetes mellitus with diabetic polyneuropathy, with long-term current use of insulin 5/14/2017    Type 2 diabetes mellitus with hyperglycemia, with long-term current use of insulin 10/11/2013         LOC: The patient is awake, alert, aware of environment with an appropriate affect. Disoriented to time and place.   APPEARANCE: Pt resting comfortably, in no acute distress, pt is clean and well groomed, clothing properly fastened  SKIN: Skin warm, dry and intact, normal skin turgor, moist mucus membranes  RESPIRATORY: Airway is open and patent, respirations are spontaneous, even and unlabored, normal effort and rate  CARDIAC: tachycardia at 100 bpm, no peripheral edema noted, capillary refill < 3 seconds, bilateral radial pulses 2+  ABDOMEN: Soft, nontender, nondistended.   GENITOURINARY:  Foul smelling urine  NEUROLOGIC: PERRL, facial expression is symmetrical, patient moving all extremities spontaneously, normal sensation in all extremities when touched with a finger.  Follows all commands appropriately  MUSCULOSKELETAL: No obvious deformities.

## 2019-12-05 LAB
ANION GAP SERPL CALC-SCNC: 8 MMOL/L (ref 8–16)
BASOPHILS # BLD AUTO: 0.01 K/UL (ref 0–0.2)
BASOPHILS NFR BLD: 0.2 % (ref 0–1.9)
BUN SERPL-MCNC: 20 MG/DL (ref 8–23)
CALCIUM SERPL-MCNC: 8.5 MG/DL (ref 8.7–10.5)
CHLORIDE SERPL-SCNC: 114 MMOL/L (ref 95–110)
CO2 SERPL-SCNC: 19 MMOL/L (ref 23–29)
CREAT SERPL-MCNC: 1.4 MG/DL (ref 0.5–1.4)
DIFFERENTIAL METHOD: ABNORMAL
EOSINOPHIL # BLD AUTO: 0.1 K/UL (ref 0–0.5)
EOSINOPHIL NFR BLD: 1.2 % (ref 0–8)
ERYTHROCYTE [DISTWIDTH] IN BLOOD BY AUTOMATED COUNT: 14.9 % (ref 11.5–14.5)
EST. GFR  (AFRICAN AMERICAN): >60 ML/MIN/1.73 M^2
EST. GFR  (NON AFRICAN AMERICAN): 53.8 ML/MIN/1.73 M^2
GLUCOSE SERPL-MCNC: 144 MG/DL (ref 70–110)
HCT VFR BLD AUTO: 28.2 % (ref 40–54)
HGB BLD-MCNC: 9.3 G/DL (ref 14–18)
IMM GRANULOCYTES # BLD AUTO: 0.02 K/UL (ref 0–0.04)
IMM GRANULOCYTES NFR BLD AUTO: 0.4 % (ref 0–0.5)
LYMPHOCYTES # BLD AUTO: 0.4 K/UL (ref 1–4.8)
LYMPHOCYTES NFR BLD: 8.3 % (ref 18–48)
MCH RBC QN AUTO: 29.3 PG (ref 27–31)
MCHC RBC AUTO-ENTMCNC: 33 G/DL (ref 32–36)
MCV RBC AUTO: 89 FL (ref 82–98)
MONOCYTES # BLD AUTO: 0.1 K/UL (ref 0.3–1)
MONOCYTES NFR BLD: 1 % (ref 4–15)
NEUTROPHILS # BLD AUTO: 4.5 K/UL (ref 1.8–7.7)
NEUTROPHILS NFR BLD: 88.9 % (ref 38–73)
NRBC BLD-RTO: 0 /100 WBC
PLATELET # BLD AUTO: 80 K/UL (ref 150–350)
PMV BLD AUTO: 10.3 FL (ref 9.2–12.9)
POCT GLUCOSE: 152 MG/DL (ref 70–110)
POCT GLUCOSE: 169 MG/DL (ref 70–110)
POCT GLUCOSE: 187 MG/DL (ref 70–110)
POTASSIUM SERPL-SCNC: 3.5 MMOL/L (ref 3.5–5.1)
RBC # BLD AUTO: 3.17 M/UL (ref 4.6–6.2)
SODIUM SERPL-SCNC: 141 MMOL/L (ref 136–145)
WBC # BLD AUTO: 5.05 K/UL (ref 3.9–12.7)

## 2019-12-05 PROCEDURE — 36415 COLL VENOUS BLD VENIPUNCTURE: CPT

## 2019-12-05 PROCEDURE — 63600175 PHARM REV CODE 636 W HCPCS: Performed by: HOSPITALIST

## 2019-12-05 PROCEDURE — 85025 COMPLETE CBC W/AUTO DIFF WBC: CPT

## 2019-12-05 PROCEDURE — 99232 SBSQ HOSP IP/OBS MODERATE 35: CPT | Mod: ,,, | Performed by: INTERNAL MEDICINE

## 2019-12-05 PROCEDURE — 95819 EEG AWAKE AND ASLEEP: CPT | Mod: 26,,, | Performed by: PSYCHIATRY & NEUROLOGY

## 2019-12-05 PROCEDURE — 95819 PR EEG,W/AWAKE & ASLEEP RECORD: ICD-10-PCS | Mod: 26,,, | Performed by: PSYCHIATRY & NEUROLOGY

## 2019-12-05 PROCEDURE — 99232 PR SUBSEQUENT HOSPITAL CARE,LEVL II: ICD-10-PCS | Mod: ,,, | Performed by: INTERNAL MEDICINE

## 2019-12-05 PROCEDURE — 95819 EEG AWAKE AND ASLEEP: CPT

## 2019-12-05 PROCEDURE — 99232 SBSQ HOSP IP/OBS MODERATE 35: CPT | Mod: ,,, | Performed by: PSYCHIATRY & NEUROLOGY

## 2019-12-05 PROCEDURE — 63600175 PHARM REV CODE 636 W HCPCS: Performed by: INTERNAL MEDICINE

## 2019-12-05 PROCEDURE — 25000003 PHARM REV CODE 250: Performed by: HOSPITALIST

## 2019-12-05 PROCEDURE — 80048 BASIC METABOLIC PNL TOTAL CA: CPT

## 2019-12-05 PROCEDURE — 99232 PR SUBSEQUENT HOSPITAL CARE,LEVL II: ICD-10-PCS | Mod: ,,, | Performed by: PSYCHIATRY & NEUROLOGY

## 2019-12-05 PROCEDURE — 92610 EVALUATE SWALLOWING FUNCTION: CPT

## 2019-12-05 PROCEDURE — 11000001 HC ACUTE MED/SURG PRIVATE ROOM

## 2019-12-05 RX ADMIN — SERTRALINE HYDROCHLORIDE 100 MG: 50 TABLET ORAL at 09:12

## 2019-12-05 RX ADMIN — MICONAZOLE NITRATE: 20 POWDER TOPICAL at 10:12

## 2019-12-05 RX ADMIN — ASPIRIN 81 MG: 81 TABLET, COATED ORAL at 09:12

## 2019-12-05 RX ADMIN — DILTIAZEM HYDROCHLORIDE 240 MG: 240 CAPSULE, COATED, EXTENDED RELEASE ORAL at 09:12

## 2019-12-05 RX ADMIN — HEPARIN SODIUM 5000 UNITS: 5000 INJECTION, SOLUTION INTRAVENOUS; SUBCUTANEOUS at 02:12

## 2019-12-05 RX ADMIN — PIPERACILLIN AND TAZOBACTAM 4.5 G: 4; .5 INJECTION, POWDER, FOR SOLUTION INTRAVENOUS at 02:12

## 2019-12-05 RX ADMIN — PIPERACILLIN AND TAZOBACTAM 4.5 G: 4; .5 INJECTION, POWDER, FOR SOLUTION INTRAVENOUS at 06:12

## 2019-12-05 RX ADMIN — PIPERACILLIN AND TAZOBACTAM 4.5 G: 4; .5 INJECTION, POWDER, FOR SOLUTION INTRAVENOUS at 10:12

## 2019-12-05 RX ADMIN — DONEPEZIL HYDROCHLORIDE 5 MG: 5 TABLET, FILM COATED ORAL at 10:12

## 2019-12-05 RX ADMIN — ATORVASTATIN CALCIUM 40 MG: 20 TABLET, FILM COATED ORAL at 09:12

## 2019-12-05 RX ADMIN — HEPARIN SODIUM 5000 UNITS: 5000 INJECTION, SOLUTION INTRAVENOUS; SUBCUTANEOUS at 10:12

## 2019-12-05 RX ADMIN — QUETIAPINE FUMARATE 25 MG: 25 TABLET, FILM COATED ORAL at 10:12

## 2019-12-05 RX ADMIN — SODIUM CHLORIDE 500 ML: 0.9 INJECTION, SOLUTION INTRAVENOUS at 10:12

## 2019-12-05 RX ADMIN — METHYLPHENIDATE HYDROCHLORIDE 10 MG: 5 TABLET ORAL at 05:12

## 2019-12-05 RX ADMIN — INSULIN ASPART 2 UNITS: 100 INJECTION, SOLUTION INTRAVENOUS; SUBCUTANEOUS at 05:12

## 2019-12-05 RX ADMIN — INSULIN DETEMIR 20 UNITS: 100 INJECTION, SOLUTION SUBCUTANEOUS at 10:12

## 2019-12-05 RX ADMIN — HEPARIN SODIUM 5000 UNITS: 5000 INJECTION, SOLUTION INTRAVENOUS; SUBCUTANEOUS at 06:12

## 2019-12-05 RX ADMIN — ACETAMINOPHEN 650 MG: 325 TABLET ORAL at 12:12

## 2019-12-05 NOTE — PLAN OF CARE
Problem: Fall Injury Risk  Goal: Absence of Fall and Fall-Related Injury  Outcome: Ongoing, Progressing     Problem: Adult Inpatient Plan of Care  Goal: Plan of Care Review  Outcome: Ongoing, Progressing     Problem: Fall Injury Risk  Goal: Absence of Fall and Fall-Related Injury  Outcome: Ongoing, Progressing     Problem: Adult Inpatient Plan of Care  Goal: Plan of Care Review  Outcome: Ongoing, Progressing  Goal: Patient-Specific Goal (Individualization)  Outcome: Ongoing, Progressing  Goal: Absence of Hospital-Acquired Illness or Injury  Outcome: Ongoing, Progressing  Goal: Optimal Comfort and Wellbeing  Outcome: Ongoing, Progressing  Goal: Readiness for Transition of Care  Outcome: Ongoing, Progressing  Goal: Rounds/Family Conference  Outcome: Ongoing, Progressing     Problem: Infection  Goal: Infection Symptom Resolution  Outcome: Ongoing, Progressing     Problem: Diabetes Comorbidity  Goal: Blood Glucose Level Within Desired Range  Outcome: Ongoing, Progressing     Problem: Skin Injury Risk Increased  Goal: Skin Health and Integrity  Outcome: Ongoing, Progressing     Problem: Skin Injury Risk Increased  Goal: Skin Health and Integrity  Outcome: Ongoing, Progressing     Problem: Diabetes Comorbidity  Goal: Blood Glucose Level Within Desired Range  Outcome: Ongoing, Progressing     Problem: Infection  Goal: Infection Symptom Resolution  Outcome: Ongoing, Progressing

## 2019-12-05 NOTE — ASSESSMENT & PLAN NOTE
Patient with known CNS vasculitis and seizures on cytoxan up until October, been off since then with plans to transition to another immunosuppressive. P/w subacute encephalopathy, urinary incontinence and fever, found to have urosepsis, most likely underlying etiology for encephalopathy.    - MRI of brain was obtained which was unremarkable  - routine EEG was obtained this morning which was negative for seizures and only intermittently slowing   - continue to treat UTI with antibiotics  - neurology will sign off, please call us with any questions or concerns  - patient will follow up in [facility] clinic

## 2019-12-05 NOTE — HPI
The patient is a 59-year-old male with known CNS vasculitis and multiple small infarcts in 2017, who is consulted to Neurology for evaluation of encephalopathy.    History is taken from the [facility] note and wife at the bedside. patient was gone for the MRI for the whole afternoon and is now eating dinner, not participating much in the history.  Patient has been on monthly Cytoxan for the last 2 years with a trial of transition to Imuran which he did not tolerate.  His last dose of Cytoxan and was administrated in October and he will remain off of with for the month of November with plans to transition to an other immunosuppressive medication since wife had reported that he is not responding to Cytoxan as much.  Within the last few days he has been more use, holding onto, hiding his medications, urinating on himself.  He was seen in the [facility] yesterday, he was febrile, urine urinalysis was sent with concerns for UTI which was positive. labs also notable for lactic acidosis (2.8).  Patient was cleared head and advised to go to the ED with concerns for sepsis.  Neurology is consulted with concerns for worsening of vasculitis and/or new strokes and/or seizures as contributing factors to his subacute encephalopathy.

## 2019-12-05 NOTE — ASSESSMENT & PLAN NOTE
Upon arrival febrile, WBC 20K, lactic acid 2.8, positive u/a (culture positive for GNR)  Urosepsis is most likely the etiology to AMS    - will defer treatment with antibiotics to the primary team

## 2019-12-05 NOTE — PROGRESS NOTES
[facility]  Neurology  Progress Note    Patient Name: Bessy Nunez  MRN: [id number]  Admission Date: 12/3/2019  Hospital Length of Stay: 2 days  Code Status: Full Code   Attending Provider: Josefina Urena*  Primary Care Physician: Edgar Nova MD   Principal Problem:Encephalopathy      Subjective:     Brief HPI:  The patient is a 59-year-old male with known CNS vasculitis and multiple small infarcts in 2017, on monthly Cytoxan, p/w subacute encephalopathy. Last dose of Cytoxan was administrated in October and remained off of it for the month of November with plans to transition to another immunosuppressive medication since he was not responding to Cytoxan anymore.    Within the last few days he has been more confused, hiding his medications, urinating on himself.  [facility] was [facility] in [facility] yesterday, he was febrile, urinalysis was sent with concerns for UTI which was positive. labs also notable for lactic acidosis (2.8).  Patient was called and advised to go to the ED with concerns for sepsis.  Neurology  was consulted with concerns for worsening of vasculitis and/or new strokes and/or seizures as contributing factors to his subacute encephalopathy.    Interval History:   12/5: No new neurological signs or symptoms. Patient with some baseline cognitive deficits that seems to be residual from vasculitis and previous strokes but no focal deficits. MRI without new strokes and EEG negative for seizures. Continued on antibiotics.    Current Neurological Medications: Seroquel + Ritalin    Current Facility-Administered Medications   Medication Dose Route Frequency Provider Last Rate Last Dose    acetaminophen tablet 650 mg  650 mg Oral Q4H PRN Kiko Carter MD   650 mg at 12/05/19 1231    albuterol-ipratropium 2.5 mg-0.5 mg/3 mL nebulizer solution 3 mL  3 mL Nebulization Q6H PRN Kiko Carter MD        aspirin EC tablet 81 mg  81 mg Oral Daily Kiko Carter MD   81 mg at 12/05/19  0955    atorvastatin tablet 40 mg  40 mg Oral Daily Kiko Carter MD   40 mg at 12/05/19 0955    dextrose 10% (D10W) Bolus  12.5 g Intravenous PRN Kiko Carter MD        dextrose 10% (D10W) Bolus  25 g Intravenous PRN Kiko Carter MD        diltiaZEM 24 hr capsule 240 mg  240 mg Oral Daily Kiko Carter MD   240 mg at 12/05/19 0955    donepezil tablet 5 mg  5 mg Oral QHS Kiko Carter MD   5 mg at 12/04/19 2056    glucagon (human recombinant) injection 1 mg  1 mg Intramuscular PRN Kiko Carter MD        glucose chewable tablet 16 g  16 g Oral PRN Kiko Carter MD        glucose chewable tablet 24 g  24 g Oral PRN Kiko Carter MD        heparin (porcine) injection 5,000 Units  5,000 Units Subcutaneous Q8H Kiko Carter MD   5,000 Units at 12/05/19 1455    insulin aspart U-100 pen 1-10 Units  1-10 Units Subcutaneous QID (AC + HS) PRN Kiko Carter MD   2 Units at 12/05/19 1701    insulin detemir U-100 pen 20 Units  20 Units Subcutaneous Daily Kiko Carter MD   20 Units at 12/05/19 1005    melatonin tablet 6 mg  6 mg Oral Nightly PRN Kiko Carter MD        methylphenidate HCl tablet 10 mg  10 mg Oral BID WM Kiko Carter MD   10 mg at 12/05/19 1701    miconazole NITRATE 2 % top powder   Topical (Top) BID Kiko Carter MD        ondansetron injection 4 mg  4 mg Intravenous Q8H PRN Kiko Carter MD        piperacillin-tazobactam 4.5 g in sodium chloride 0.9% 100 mL IVPB (ready to mix system)  4.5 g Intravenous Q8H Kiko Carter  mL/hr at 12/05/19 1442 4.5 g at 12/05/19 1442    prochlorperazine injection Soln 5 mg  5 mg Intravenous Q6H PRN Kiko Carter MD        QUEtiapine tablet 25 mg  25 mg Oral QHS Kiko Carter MD   25 mg at 12/04/19 2056    sertraline tablet 100 mg  100 mg Oral Daily Kiko Carter MD   100 mg at 12/05/19 0955    sodium chloride 0.9% flush 10 mL  10 mL Intravenous PRN Kiko Carter MD           Review of Systems   Constitutional:  Positive for fever. Negative for chills.   HENT: Negative for drooling and trouble swallowing.    Eyes: Negative for visual disturbance.   Respiratory: Negative for choking and shortness of breath.    Cardiovascular: Negative for chest pain and palpitations.   Gastrointestinal: Negative for nausea and vomiting.   Genitourinary: Positive for frequency.   Musculoskeletal: Positive for gait problem.   Neurological: Negative for facial asymmetry, speech difficulty and weakness.   Psychiatric/Behavioral: Positive for behavioral problems and confusion. Negative for agitation.     Objective:     Vital Signs (Most Recent):  Temp: 98.4 °F (36.9 °C) (12/05/19 1641)  Pulse: 86 (12/05/19 1641)  Resp: 19 (12/05/19 1641)  BP: (!) 146/69 (12/05/19 1641)  SpO2: (!) 93 % (12/05/19 1641) Vital Signs (24h Range):  Temp:  [96.4 °F (35.8 °C)-100.8 °F (38.2 °C)] 98.4 °F (36.9 °C)  Pulse:  [86-99] 86  Resp:  [18-20] 19  SpO2:  [90 %-93 %] 93 %  BP: (124-176)/(60-88) 146/69     Weight: 104.3 kg (230 lb)  Body mass index is 32.54 kg/m².    Physical Exam   Constitutional: He is oriented to person, place, and time. He appears well-developed and well-nourished. He is cooperative. He does not appear ill. No distress.   HENT:   Head: Normocephalic.   Eyes: Pupils are equal, round, and reactive to light. EOM are normal.   Neck: Normal range of motion.   Cardiovascular: Normal rate.   Pulmonary/Chest: Effort normal. No respiratory distress.   Abdominal: Soft. He exhibits no distension.   Musculoskeletal: Normal range of motion.   Neurological: He is alert and oriented to person, place, and time.   Speech normal   Facial expression symmetric  EOM intact  Cranial nerves otherwise intact  5/5 strength throughout  Sensation intact throughout  DTRs 1+ throughout  Toes down going  Coordination normal on upper extremitis  Skin: Skin is warm. He is not diaphoretic. No cyanosis.   Psychiatric: He has a normal mood and affect. His speech is normal.   Vitals  reviewed.      Significant Labs:   Hemoglobin A1c:   Recent Labs   Lab 12/04/19 0454   HGBA1C 7.4*     Blood Culture:   Recent Labs   Lab 12/03/19 1937 12/03/19 2157   LABBLOO No Growth to date  No Growth to date No Growth to date  No Growth to date     CBC:   Recent Labs   Lab 12/03/19 1937 12/04/19 0454 12/05/19  0431   WBC 20.10* 11.07 5.05   HGB 10.8* 9.6* 9.3*   HCT 32.3* 29.6* 28.2*   * 93* 80*     CMP:   Recent Labs   Lab 12/03/19 1937 12/04/19 0454 12/05/19  0431   * 149* 144*   * 139 141   K 3.8 3.6 3.5    110 114*   CO2 24 19* 19*   BUN 32* 24* 20   CREATININE 1.7* 1.2 1.4   CALCIUM 9.9 8.4* 8.5*   PROT 7.0  --   --    ALBUMIN 3.7  --   --    BILITOT 1.3*  --   --    ALKPHOS 81  --   --    AST 20  --   --    ALT 15  --   --    ANIONGAP 9 10 8   EGFRNONAA 42.6* >60.0 53.8*     POCT Glucose:   Recent Labs   Lab 12/04/19  1725 12/05/19  1339   POCTGLUCOSE 121* 187*     Urine Culture:   Recent Labs   Lab 12/03/19 2051   LABURIN GRAM NEGATIVE KENDALL  10,000 - 49,999 cfu/ml  Identification and susceptibility pending  *     Urine Studies:   Recent Labs   Lab 12/03/19 2051   COLORU Beti   APPEARANCEUA Cloudy*   PHUR 5.0   SPECGRAV 1.020   PROTEINUA 2+*   GLUCUA Negative   KETONESU Negative   BILIRUBINUA Negative   OCCULTUA 3+*   NITRITE Negative   LEUKOCYTESUR 2+*   RBCUA 34*   WBCUA 15*   BACTERIA Occasional   HYALINECASTS 17*       Significant Imaging:  Routine EEG (12/5/19):  Preliminary results: subtle intermittent temporal slowing (L>R)  No electrographic seizures or epileptiform discharges that could suggest seizure potential    MRI Brain w/wo (12/4/19):  Chronic microvascular changes and evidence of multiple remote small infarct and micro-hemorrhages  No new infarcts or any other acute intracranial pathology      Assessment and Plan:     * Encephalopathy  Patient with known CNS vasculitis and seizures on cytoxan up until October, been off since then with plans to  transition to another immunosuppressive. P/w subacute encephalopathy, urinary incontinence and fever, found to have urosepsis, most likely underlying etiology for encephalopathy.    - MRI of brain was obtained which was unremarkable  - routine EEG was obtained this morning which was negative for seizures and only intermittently slowing   - continue to treat UTI with antibiotics  - neurology will sign off, please call us with any questions or concerns  - patient will follow up in [facility]     CNS vasculitis failed imuran; on cytoxan  Patient with known Hx of CNS vasculitis and subsequent multiple small infarcts in 2017, stable with monthly cytoxan. Did not receive November dose due to plans to transition to alternate medication, which raised concerns for vasculitis flare and new infarcts as the potential etiology to change in mental status.    - MRI of Brain was negative for new infarcts  - continue to hold off on immunosuppressive medications in the setting f infection  - will have close follow up with [facility] (Dr. Love and Beti Boswell) for decision on future treatment plan    Severe sepsis  Upon arrival febrile, WBC 20K, lactic acid 2.8, positive u/a (culture positive for GNR)  Urosepsis is most likely the etiology to AMS    - will defer treatment with antibiotics to the primary team        VTE Risk Mitigation (From admission, onward)         Ordered     heparin (porcine) injection 5,000 Units  Every 8 hours      12/03/19 2353     IP VTE HIGH RISK PATIENT  Once      12/03/19 235                Barbie Suresh MD  Neurology  [facility]

## 2019-12-05 NOTE — CONSULTS
[facility]  Neurology  Consult Note    Patient Name: Bessy Nunez  MRN: [id number]  Admission Date: 12/3/2019  Hospital Length of Stay: 1 days  Code Status: Full Code   Attending Provider: Josefina Urena*   Consulting Provider: Barbie Suresh MD  Primary Care Physician: Edgar Nova MD  Principal Problem:Encephalopathy    Inpatient consult to Neurology  Consult performed by: Barbie Suresh MD  Consult ordered by: Josefina Urena MD  Reason for consult: Hx of vascultis with new encephalopathy         Subjective:     Chief Complaint:  Altered mental status    HPI:   The patient is a 59-year-old male with known CNS vasculitis and multiple small infarcts in 2017, who is consulted to Neurology for evaluation of encephalopathy.    History is taken from the [facility] note and wife at the bedside. patient was gone for the MRI for the whole afternoon and is now eating dinner, not participating much in the history.  Patient has been on monthly Cytoxan for the last 2 years with a trial of transition to Imuran which he did not tolerate.  His last dose of Cytoxan and was administrated in October and he will remain off of with for the month of November with plans to transition to an other immunosuppressive medication since wife had reported that he is not responding to Cytoxan as much.  Within the last few days he has been more use, holding onto, hiding his medications, urinating on himself.  He was seen in the [facility] yesterday, he was febrile, urine urinalysis was sent with concerns for UTI which was positive. labs also notable for lactic acidosis (2.8).  Patient was cleared head and advised to go to the ED with concerns for sepsis.  Neurology is consulted with concerns for worsening of vasculitis and/or new strokes and/or seizures as contributing factors to his subacute encephalopathy.         Past Medical History:   Diagnosis Date    Amblyopia     Cataract     CNS vasculitis  6/2/2017    Follows with Dr. Love By mri.  Several active lesions, many old. 5/13: MRA brain/neck, MRI brain w/wo contrast show no vascular occlusion, multiple foci of hyperintensity in deep cerebral periventricular white matter in pattern of demyelinating process.  5/17: MRI spine performed, no spinal cord lesions. Hospitalization 6/2017. EEG was performed negative for seizures.  Repeat MRI showing new lesion, question whether this was demyelination versus CVA. 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 Findings compatible with a small acute lacunar infarct adjacent to the left frontal horn.  Nonspecific enhancement just inferior to this region and extending into the left basal ganglia, as well as within the inferior aspect of the left cerebellum.  No evidence of a focal mass. 2.  Extensive increased signal intensity involving the periventricular white matter compatible with demyelinating disease versus vasculitis. 3.  Clinical correlation and followup recommended. 4.  This reportedly flattened in the EPIC and the corpus callosum medical record system.    Mixed hyperlipidemia 11/9/2012    NAFLD (nonalcoholic fatty liver disease) 10/11/2013    CHASE (nonalcoholic steatohepatitis)     Obesity     Stroke     Type 2 diabetes mellitus with diabetic polyneuropathy, with long-term current use of insulin 5/14/2017    Type 2 diabetes mellitus with hyperglycemia, with long-term current use of insulin 10/11/2013       Past Surgical History:   Procedure Laterality Date    COLONOSCOPY N/A 5/21/2019    Procedure: COLONOSCOPY;  Surgeon: Alex Ascencio MD;  Location[facility];  Service: Endoscopy;  Laterality: N/A;  confirmed appt-sp    INSERTION OF TUNNELED CENTRAL VENOUS CATHETER (CVC) WITH SUBCUTANEOUS  PORT N/A 12/7/2018    Procedure: [id number];  Surgeon: Luan Diagnostic Provider;  Location: [facility] OR [address] [place] [address];  Service: Radiology;  Laterality: N/A;    SKIN CANCER EXCISION         Review of patient's allergies indicates:  No Known Allergies    Current Neurological Medications: Seroquel + Ritalin    No current facility-administered medications on file prior to encounter.      Current Outpatient Medications on File Prior to Encounter   Medication Sig    alclomethasone (ACLOVATE) 0.05 % cream     alendronate (FOSAMAX) 70 MG tablet Take 1 tablet (70 mg total) by mouth every 7 days.    aspirin (ECOTRIN) 81 MG EC tablet Take 81 mg by mouth once daily.    atenolol (TENORMIN) 50 MG tablet TAKE 1 TABLET BY MOUTH ONCE DAILY    atorvastatin (LIPITOR) 40 MG tablet TAKE 1 TABLET BY MOUTH ONCE DAILY    blood sugar diagnostic (TRUE METRIX GLUCOSE TEST STRIP) Strp Test blood sugar four times a day    DILT- mg CDCR TAKE 1 CAPSULE BY MOUTH ONCE DAILY    donepezil (ARICEPT) 5 MG tablet Take 1 tablet (5 mg total) by mouth every evening. Start with 1/2 tab daily x 1 week then whole tablet maintenance    flash glucose sensor (FREESTYLE ARELY 14 DAY SENSOR) Kit Glucose checking 4 times a day    FREESTYLE ARELY 14 DAY READER Misc GLUCOSE TESTING : FASTING AND PRIOR TO MEALS    insulin (BASAGLAR KWIKPEN U-100 INSULIN) glargine 100 units/mL (3mL) SubQ pen Inject 40 Units into the skin 2 (two) times daily. Up to 60 BID with cytoxan    insulin aspart U-100 (NOVOLOG) 100 unit/mL InPn pen Inject 20 Units into the skin 3 (three) times daily with meals. (Patient taking differently: Inject 20 Units into the skin 4 (four) times daily. )    insulin syringe-needle U-100 (INSULIN SYRINGE) 1/2 mL 30 gauge x 5/16 Syrg Use 3x/day    irbesartan (AVAPRO) 300 MG tablet TAKE 1 TABLET BY MOUTH ONCE DAILY IN THE EVENING    ketoconazole (NIZORAL) 2 % cream     lancets 30 gauge Misc Test blood sugar four times a day     "liraglutide 0.6 mg/0.1 mL, 18 mg/3 mL, subq PNIJ (VICTOZA 3-TOMASZ) 0.6 mg/0.1 mL (18 mg/3 mL) PnIj Inject 1.8 mg into the skin once daily.    metFORMIN (GLUCOPHAGE-XR) 500 MG 24 hr tablet Take 2 tablets (1,000 mg total) by mouth 2 (two) times daily with meals.    methylphenidate HCl (RITALIN) 10 MG tablet Take 1 tablet (10 mg total) by mouth 2 (two) times daily with meals.    omega-3 fatty acids-vitamin E (FISH OIL) 1,000 mg Cap Take 1 capsule by mouth 2 (two) times daily.    pen needle, diabetic (BD ULTRA-FINE ANA PEN NEEDLE) 32 gauge x 5/32" Ndle 4x daily insulin pen needle, relion    QUEtiapine (SEROQUEL) 25 MG Tab Take 1 tablet (25 mg total) by mouth every evening.    sertraline (ZOLOFT) 100 MG tablet TAKE 1 & 1/2 (ONE & ONE-HALF) TABLETS BY MOUTH ONCE DAILY    VICTOZA 2-TOMASZ 0.6 mg/0.1 mL (18 mg/3 mL) PnIj INJECT 1.8 MG INTO THE SKIN ONCE DAILY    vitamin D 1000 units Tab Take 5 tablets (5,000 Units total) by mouth once daily.     Family History     Problem Relation (Age of Onset)    Cancer Father    Cataracts Mother    Diabetes Maternal Aunt    Heart disease Mother    Heart failure Mother    Hypertension Mother    Rheum arthritis Paternal Grandmother    Stroke Sister        Tobacco Use    Smoking status: Never Smoker    Smokeless tobacco: Never Used   Substance and Sexual Activity    Alcohol use: No     Alcohol/week: 0.0 standard drinks     Frequency: Never     Drinks per session: 1 or 2     Binge frequency: Never    Drug use: No    Sexual activity: Yes     Partners: Female     Review of Systems   Constitutional: Positive for fever. Negative for chills.   HENT: Negative for drooling and trouble swallowing.    Eyes: Negative for visual disturbance.   Respiratory: Negative for choking and shortness of breath.    Cardiovascular: Negative for chest pain and palpitations.   Gastrointestinal: Negative for nausea and vomiting.   Genitourinary: Positive for frequency.   Musculoskeletal: Positive for gait " problem.   Neurological: Negative for facial asymmetry, speech difficulty and weakness.   Psychiatric/Behavioral: Positive for behavioral problems and confusion. Negative for agitation.     Objective:     Vital Signs (Most Recent):  Temp: 98.7 °F (37.1 °C) (12/04/19 1511)  Pulse: 87 (12/04/19 1605)  Resp: 20 (12/04/19 1511)  BP: 119/62 (12/04/19 1511)  SpO2: 95 % (12/04/19 1605) Vital Signs (24h Range):  Temp:  [97.6 °F (36.4 °C)-101.9 °F (38.8 °C)] 98.7 °F (37.1 °C)  Pulse:  [] 87  Resp:  [16-20] 20  SpO2:  [92 %-100 %] 95 %  BP: (119-154)/(59-83) 119/62     Weight: 104.3 kg (230 lb)  Body mass index is 32.54 kg/m².    Physical Exam   Constitutional: He is oriented to person, place, and time. He appears well-developed and well-nourished. He is cooperative. He does not appear ill. No distress.   HENT:   Head: Normocephalic.   Eyes: EOM are normal.   Neck: Normal range of motion.   Cardiovascular: Normal rate.   Pulmonary/Chest: Effort normal. No respiratory distress.   Musculoskeletal: Normal range of motion.   Neurological: He is alert and oriented to person, place, and time.   AAOx3  Speech normal   Facial expression symmetric  EOM intact  Moves all 4 extremities, feeding himself  Patient was gone for the MRI for the afternoon and now is eating, therefore declining a full neuro exam  Skin: He is not diaphoretic. No cyanosis.   Psychiatric: He has a normal mood and affect. His speech is normal and behavior is normal.   Vitals reviewed.      Significant Labs:   Hemoglobin A1c:   Recent Labs   Lab 12/04/19  0454   HGBA1C 7.4*     Blood Culture:   Recent Labs   Lab 12/03/19 1937 12/03/19 2157   LABBLOO No Growth to date No Growth to date     CBC:   Recent Labs   Lab 12/03/19  1547 12/03/19 1937 12/04/19  0454   WBC 21.48* 20.10* 11.07   HGB 11.2* 10.8* 9.6*   HCT 33.6* 32.3* 29.6*   * 135* 93*     CMP:   Recent Labs   Lab 12/03/19  1547 12/03/19  1937 12/04/19  0454   * 216* 149*    135*  139   K 4.3 3.8 3.6    102 110   CO2 23 24 19*   BUN 28* 32* 24*   CREATININE 1.6* 1.7* 1.2   CALCIUM 9.6 9.9 8.4*   PROT 7.2 7.0  --    ALBUMIN 3.7 3.7  --    BILITOT 1.3* 1.3*  --    ALKPHOS 84 81  --    AST 20 20  --    ALT 16 15  --    ANIONGAP 11 9 10   EGFRNONAA 45.8* 42.6* >60.0     POCT Glucose:   Recent Labs   Lab 12/04/19  0743 12/04/19  1235 12/04/19  1725   POCTGLUCOSE 156* 125* 121*     Urine Culture:   Recent Labs   Lab 12/03/19 2051   LABURIN GRAM NEGATIVE KENDALL  10,000 - 49,999 cfu/ml  Identification and susceptibility pending  *     Urine Studies:   Recent Labs   Lab 12/03/19 2051   COLORU Beti   APPEARANCEUA Cloudy*   PHUR 5.0   SPECGRAV 1.020   PROTEINUA 2+*   GLUCUA Negative   KETONESU Negative   BILIRUBINUA Negative   OCCULTUA 3+*   NITRITE Negative   LEUKOCYTESUR 2+*   RBCUA 34*   WBCUA 15*   BACTERIA Occasional   HYALINECASTS 17*       Significant Imaging:  MRI Brain w/wo (12/4/19):  Chronic microvascular changes and evidence of multiple remote small infarct and micro-hemorrhages  No new infarcts or any other acute intracranial pathology      Assessment and Plan:     * Encephalopathy  Patient with known CNS vasculitis and seizures on cytoxan up until October, been off since then with plans to transition to another immunosuppressive. P/w subacute encephalopathy, urinary incontinence and fever, found to have urosepsis. Most likely underlying etiology for encephalopathy but still would rule out worsening of vasculitis, new infarcts, and subclinical seizures.    - recommended MRI of brain which was unremarkable  - recommended routine EEG to rule out NCSE  - continue to treat UTI with antibiotics        CNS vasculitis failed imuran; on cytoxan  Patient with known Hx of CNS vasculitis and subsequent multiple small infarcts in 2017, stable with monthly cytoxan. Did not received November dose due to plans to transition to alternate medication, which raised concerns for vasculitis flare and new  infarcts as the potential etiology to change in mental status.    - recommended to obtain MRI of Brain which was negative for new infarcts  - continue to hold off on immunosuppressive medications in the setting f infection  - close follow up with [facility] center (Dr. Love and Beti Boswell) for decision on future treatment plan    Severe sepsis  Upon arrival febrile, WBC 20K, lactic acid 2.8, positive u/a (culture positive for GNR)  Urosepsis is most likely the etiology to AMS    - will defer treatment with antibiotics to the primary team        VTE Risk Mitigation (From admission, onward)         Ordered     heparin (porcine) injection 5,000 Units  Every 8 hours      12/03/19 2353     IP VTE HIGH RISK PATIENT  Once      12/03/19 7750                Thank you for your consult. I will follow-up with patient. Please contact us if you have any additional questions.    Barbie Suresh MD  Neurology  [facility]

## 2019-12-05 NOTE — PROGRESS NOTES
Hospital Medicine  Progress Note      Patient Name: Bessy Nunez  MRN: [id number]  Date of Admission: 12/3/2019     Principal Problem: Encephalopathy     Subjective     Afebrile overnight. Mental status appears to be improving; answering some questions appropriately and following commands. However, disoriented to time, place and situation. MRI brain yesterday unremarkable. EEG pending.      Review of Systems      Constitutional:NEGATIVE for chills, fatigue, fever.   HENT: Negative for sore throat, trouble swallowing.    Eyes: Negative for photophobia, visual disturbance.   Respiratory: Negative for cough, shortness of breath.    Cardiovascular: Negative for chest pain, palpitations, leg swelling.   Gastrointestinal: Negative for abdominal pain, constipation, diarrhea, nausea, vomiting.   Endocrine: Negative for cold intolerance, heat intolerance.   Genitourinary: Negative for dysuria, frequency.   Musculoskeletal: Negative for arthralgias, myalgias.   Skin: Negative for rash, wound, erythema   Neurological: Negative for dizziness, syncope, weakness, light-headedness.   Psychiatric/Behavioral: POSITIVE for confusion,      Medications  Scheduled Meds:   aspirin  81 mg Oral Daily    atorvastatin  40 mg Oral Daily    diltiaZEM  240 mg Oral Daily    donepezil  5 mg Oral QHS    heparin (porcine)  5,000 Units Subcutaneous Q8H    insulin detemir U-100  20 Units Subcutaneous Daily    methylphenidate HCl  10 mg Oral BID WM    miconazole NITRATE 2 %   Topical (Top) BID    piperacillin-tazobactam (ZOSYN) IVPB  4.5 g Intravenous Q8H    QUEtiapine  25 mg Oral QHS    sertraline  100 mg Oral Daily    sodium chloride 0.9%  500 mL Intravenous Once     Continuous Infusions:  PRN Meds:.acetaminophen, albuterol-ipratropium, Dextrose 10% Bolus, Dextrose 10% Bolus, glucagon (human recombinant), glucose, glucose, insulin aspart U-100, melatonin, ondansetron, prochlorperazine, sodium chloride 0.9%    Objective    Physical  Examination    Temp:  [96.4 °F (35.8 °C)-101.9 °F (38.8 °C)]   Pulse:  [87-99]   Resp:  [18-20]   BP: (119-176)/(60-88)   SpO2:  [90 %-95 %]     Gen: NAD, ill appearing   Head: NC, AT  Eyes: PERRLA, EOMI  Throat: MMM, OP clear  CV: RRR, no M/R/G, no peripheral edema, no JVD  Resp: CTAB, no increased work of breathing on room air  GI: Soft, NT, ND, +BS  Ext: MAEW, no c/c/e  Skin: erythematous skin at groin and lower abdomen  Neuro: AAOx1, CN grossly intact, no focal neurologic deficits  Psychiatry: Normal mood, normal affect, no SI/HI    CBC  Recent Labs   Lab 12/03/19 1937 12/04/19 0454 12/05/19  0431   WBC 20.10* 11.07 5.05   HGB 10.8* 9.6* 9.3*   HCT 32.3* 29.6* 28.2*   * 93* 80*     CMP  Recent Labs   Lab 12/03/19  1547 12/03/19 1937 12/04/19 0454 12/05/19  0431    135* 139 141   K 4.3 3.8 3.6 3.5    102 110 114*   CO2 23 24 19* 19*   BUN 28* 32* 24* 20   CREATININE 1.6* 1.7* 1.2 1.4   * 216* 149* 144*   CALCIUM 9.6 9.9 8.4* 8.5*   ALKPHOS 84 81  --   --    ALT 16 15  --   --    AST 20 20  --   --    ALBUMIN 3.7 3.7  --   --    PROT 7.2 7.0  --   --    BILITOT 1.3* 1.3*  --   --            Hospital Course:  Admitted o  on 12/4 for acute encephalopathy and sepsis due to UTI.  Started empirically on zosyn. Febrile overnight. Wife concerned about worsening mental status this morning and seizure like activity (rt leg shaking and tachypnea with unresponsiveness) in the morning. Neurology consulted and recommended MRI w/wo contrast and EEG. Disoriented to time, but awakens and answers some questions appropriately during eval. Denies dysuria, N/V, SOB. MRI brain w chronic ischemic change, no evidence of recent infarction or other acute intracranial pathology.    Assessment and Plan:    Severe Sepsis 2/2 UTI    -Pt. With low grade fever, WBC 20, and lactate elevated to 2.8  -CXR without consolidation, no URI symptoms  -U/A with 15 WBC, LE +, but nitrite negative. Possible source  - urine  culture - GNR  - blood cultures- NGTD  --flu negative  - on day 2 zosyn, likely de-escalate tomorrow if remains afebrile and pending UCx speciation       Acute Encephalopathy  --pt w hx of cns vasculitis and baseline neurocognitive deficits, likely exacerbated by infection  --neuro consulted given seizure-like activity per wife and >1wk hx of AMS; appreciate assistance  --MRI without acute intracranial pathology  --f/u EEG  --continue empiric abx       CNS Vasculitis  -Pt. Followed outpatient by neurology, continue cytoxan monthly  -Continue meds donepezil, sertraline, seroquel, and methylphenidate for mentation  - neuro consulted  --MRI unremarkable     Intertrigo  -Rash noted in folds of groin, will treat with topical miconazole  -Monitor     MANISH  -Baseline Cr ~1.0, pt. Presents with Cr 1.7-->1.4  -Suspect prerenal source in setting of sepsis  - improving    -Hold neprohotoxic meds such as ARB irbesartan  --500cc NS ordered  - daily bmp     DM2  - Last HbA1c: 6.2 on 4/2019; now 7.4  - Diabetic diet with SSI insulin ordered  -Lantus 40 units BID listed as home dose, pt. Reports only being on 40 unitsdaily and wife states he misses that dose sometimes  -Levemir 20 units for now, uptitrate as needed     Hypertension:  - continue atenolol, diltiazem. Hold irbesartan in setting of MANISH- can resume If needed     Dyslipidemia:  - continue atorvastatin     Hx of CVA:  - continue asa and atorvastatin    Diet: diabetic  VTE PPX: heparin    Goals of care: full  Dispo: SNF pending clinical improvement    Josefina Urena M.D.  Department of Hospital Medicine  [facility]  [phone] (pager)

## 2019-12-05 NOTE — ASSESSMENT & PLAN NOTE
Patient with known Hx of CNS vasculitis and subsequent multiple small infarcts in 2017, stable with monthly cytoxan. Did not received November dose due to plans to transition to alternate medication, which raised concerns for vasculitis flare and new infarcts as the potential etiology to change in mental status.    - recommended to obtain MRI of Brain which was negative for new infarcts  - continue to hold off on immunosuppressive medications in the setting f infection  - close follow up with [facility] (Dr. Love and Beti Boswell) for decision on future treatment plan

## 2019-12-05 NOTE — PT/OT/SLP EVAL
Speech Language Pathology Evaluation  Bedside Swallow/Discharge Summary    Patient Name:  Bessy Nunez   MRN:  [id number]  Admitting Diagnosis: Encephalopathy    Recommendations:                 General Recommendations:  none  Diet recommendations:  Regular, Thin   Aspiration Precautions: Standard aspiration precautions   General Precautions: Standard, fall  Communication strategies:  none    History:     Past Medical History:   Diagnosis Date    Amblyopia     Cataract     CNS vasculitis 6/2/2017    Follows with Dr. Love By mri.  Several active lesions, many old. 5/13: MRA brain/neck, MRI brain w/wo contrast show no vascular occlusion, multiple foci of hyperintensity in deep cerebral periventricular white matter in pattern of demyelinating process.  5/17: MRI spine performed, no spinal cord lesions. Hospitalization 6/2017. EEG was performed negative for seizures.  Repeat MRI showing new lesion, question whether this was demyelination versus CVA. 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 Findings compatible with a small acute lacunar infarct adjacent to the left frontal horn.  Nonspecific enhancement just inferior to this region and extending into the left basal ganglia, as well as within the inferior aspect of the left cerebellum.  No evidence of a focal mass. 2.  Extensive increased signal intensity involving the periventricular white matter compatible with demyelinating disease versus vasculitis. 3.  Clinical correlation and followup recommended. 4.  This reportedly flattened in the EPIC and the corpus callosum medical record system.    Mixed hyperlipidemia 11/9/2012    NAFLD (nonalcoholic fatty liver disease) 10/11/2013    CHASE (nonalcoholic steatohepatitis)     Obesity     Stroke     Type  "2 diabetes mellitus with diabetic polyneuropathy, with long-term current use of insulin 5/14/2017    Type 2 diabetes mellitus with hyperglycemia, with long-term current use of insulin 10/11/2013       Past Surgical History:   Procedure Laterality Date    COLONOSCOPY N/A 5/21/2019    Procedure: COLONOSCOPY;  Surgeon: Alex Ascencio MD;  Location: [facility];  Service: Endoscopy;  Laterality: N/A;  confirmed appt-sp    INSERTION OF TUNNELED CENTRAL VENOUS CATHETER (CVC) WITH SUBCUTANEOUS PORT N/A 12/7/2018    Procedure: [id number];  Surgeon: Luan Diagnostic Provider;  Location: [facility] OR [address] [place] [address];  Service: Radiology;  Laterality: N/A;    SKIN CANCER EXCISION         Social History: Patient lives with his wife    Prior diet: regular/thin  Subjective     "My throat hurts"  Patient goals: go home    Pain/Comfort:  · Pain Rating 1: 0/10  · Pain Rating Post-Intervention 1: 0/10    Objective:     Oral Musculature Evaluation  · Oral Musculature: WFL  · Dentition: present and adequate  · Mucosal Quality: adequate  · Mandibular Strength and Mobility: WFL  · Oral Labial Strength and Mobility: WFL  · Lingual Strength and Mobility: WFL  · Volitional Cough: adequate  · Voice Prior to PO Intake: clear    Bedside Swallow Eval:   Consistencies Assessed:  · Thin liquids cup and straw sips x2  cups  · Puree pudding x 1/2 container  · Solids cracker     Oral Phase:   · WFL    Pharyngeal Phase:   · no overt clinical signs/symptoms of pharyngeal dysphagia    Compensatory Strategies  · None    Treatment: Pt reported sore throat and a "burnging pain". He reported pain at all times but felt it was worse when swallowing. However, this did not seem to affect his ability to eat as he tolerated all consistencies given. No overt s/s of aspiration. Pt safe for regular diet and thin liquids. White board updated    Assessment:     Bessy Nunez is a 59 y.o. male with an SLP diagnosis of swallowing wfl.  He presents with no overt s./s " of aspiration.    Goals:   Multidisciplinary Problems     SLP Goals        Problem: SLP Goal    Goal Priority Disciplines Outcome   SLP Goal     SLP Ongoing, Progressing                   Plan:     · Patient to be seen:      · Plan of Care expires:     · Plan of Care reviewed with:  patient   · SLP Follow-Up:  No       Discharge recommendations:  nursing facility, skilled       Time Tracking:     SLP Treatment Date:   12/05/19  Speech Start Time:  0940  Speech Stop Time:  0954     Speech Total Time (min):  14 min    Billable Minutes: Eval Swallow and Oral Function 14    JULIO Pena, CCC-SLP  12/05/2019

## 2019-12-05 NOTE — SUBJECTIVE & OBJECTIVE
Subjective:     Brief HPI:  The patient is a 59-year-old male with known CNS vasculitis and multiple small infarcts in 2017, on monthly Cytoxan, p/w subacute encephalopathy. Last dose of Cytoxan was administrated in October and remained off of it for the month of November with plans to transition to another immunosuppressive medication since he was not responding to Cytoxan anymore.    Within the last few days he has been more confused, hiding his medications, urinating on himself.  He was seen in the [facility] yesterday, he was febrile, urinalysis was sent with concerns for UTI which was positive. labs also notable for lactic acidosis (2.8).  Patient was called and advised to go to the ED with concerns for sepsis.  Neurology was consulted with concerns for worsening of vasculitis and/or new strokes and/or seizures as contributing factors to his subacute encephalopathy.    Interval History:   12/5: No new neurological signs or symptoms. Patient with some baseline cognitive deficits that seems to be residual from vasculitis and previous strokes but no focal deficits. MRI without new strokes and EEG negative for seizures. Continued on antibiotics.    Current Neurological Medications: Seroquel + Ritalin    Current Facility-Administered Medications   Medication Dose Route Frequency Provider Last Rate Last Dose    acetaminophen tablet 650 mg  650 mg Oral Q4H PRN Kiko Carter MD   650 mg at 12/05/19 1231    albuterol-ipratropium 2.5 mg-0.5 mg/3 mL nebulizer solution 3 mL  3 mL Nebulization Q6H PRN Kiko Carter MD        aspirin EC tablet 81 mg  81 mg Oral Daily Kiko Carter MD   81 mg at 12/05/19 0955    atorvastatin tablet 40 mg  40 mg Oral Daily Kiko Carter MD   40 mg at 12/05/19 0955    dextrose 10% (D10W) Bolus  12.5 g Intravenous PRN Kiko Carter MD        dextrose 10% (D10W) Bolus  25 g Intravenous PRN Kiko Carter MD        diltiaZEM 24 hr capsule 240 mg  240 mg Oral Daily Kiko SIMS  MD Raul   240 mg at 12/05/19 0955    donepezil tablet 5 mg  5 mg Oral QHS Kiko Carter MD   5 mg at 12/04/19 2056    glucagon (human recombinant) injection 1 mg  1 mg Intramuscular PRN Kiko Carter MD        glucose chewable tablet 16 g  16 g Oral PRN Kiko Carter MD        glucose chewable tablet 24 g  24 g Oral PRN Kiko Carter MD        heparin (porcine) injection 5,000 Units  5,000 Units Subcutaneous Q8H Kiko Carter MD   5,000 Units at 12/05/19 1455    insulin aspart U-100 pen 1-10 Units  1-10 Units Subcutaneous QID (AC + HS) PRN Kiko Carter MD   2 Units at 12/05/19 1701    insulin detemir U-100 pen 20 Units  20 Units Subcutaneous Daily Kiko Carter MD   20 Units at 12/05/19 1005    melatonin tablet 6 mg  6 mg Oral Nightly PRN Kiko Carter MD        methylphenidate HCl tablet 10 mg  10 mg Oral BID WM Kiko Carter MD   10 mg at 12/05/19 1701    miconazole NITRATE 2 % top powder   Topical (Top) BID Kiko Carter MD        ondansetron injection 4 mg  4 mg Intravenous Q8H PRN Kiko Carter MD        piperacillin-tazobactam 4.5 g in sodium chloride 0.9% 100 mL IVPB (ready to mix system)  4.5 g Intravenous Q8H Kiko Carter  mL/hr at 12/05/19 1442 4.5 g at 12/05/19 1442    prochlorperazine injection Soln 5 mg  5 mg Intravenous Q6H PRN Kiko Carter MD        QUEtiapine tablet 25 mg  25 mg Oral QHS Kiko Carter MD   25 mg at 12/04/19 2056    sertraline tablet 100 mg  100 mg Oral Daily Kiko Carter MD   100 mg at 12/05/19 0955    sodium chloride 0.9% flush 10 mL  10 mL Intravenous PRN Kiko Carter MD           Review of Systems   Constitutional: Positive for fever. Negative for chills.   HENT: Negative for drooling and trouble swallowing.    Eyes: Negative for visual disturbance.   Respiratory: Negative for choking and shortness of breath.    Cardiovascular: Negative for chest pain and palpitations.   Gastrointestinal: Negative for nausea and  vomiting.   Genitourinary: Positive for frequency.   Musculoskeletal: Positive for gait problem.   Neurological: Negative for facial asymmetry, speech difficulty and weakness.   Psychiatric/Behavioral: Positive for behavioral problems and confusion. Negative for agitation.     Objective:     Vital Signs (Most Recent):  Temp: 98.4 °F (36.9 °C) (12/05/19 1641)  Pulse: 86 (12/05/19 1641)  Resp: 19 (12/05/19 1641)  BP: (!) 146/69 (12/05/19 1641)  SpO2: (!) 93 % (12/05/19 1641) Vital Signs (24h Range):  Temp:  [96.4 °F (35.8 °C)-100.8 °F (38.2 °C)] 98.4 °F (36.9 °C)  Pulse:  [86-99] 86  Resp:  [18-20] 19  SpO2:  [90 %-93 %] 93 %  BP: (124-176)/(60-88) 146/69     Weight: 104.3 kg (230 lb)  Body mass index is 32.54 kg/m².    Physical Exam   Constitutional: He is oriented to person, place, and time. He appears well-developed and well-nourished. He is cooperative. He does not appear ill. No distress.   HENT:   Head: Normocephalic.   Eyes: Pupils are equal, round, and reactive to light. EOM are normal.   Neck: Normal range of motion.   Cardiovascular: Normal rate.   Pulmonary/Chest: Effort normal. No respiratory distress.   Abdominal: Soft. He exhibits no distension.   Musculoskeletal: Normal range of motion.   Neurological: He is alert and oriented to person, place, and time.     Speech normal   Facial expression symmetric  EOM intact  Cranial nerves otherwise intact  5/5 strength throughout  Sensation intact throughout  DTRs 1+ throughout  Toes down going  Coordination normal on upper extremitis       Skin: Skin is warm. He is not diaphoretic. No cyanosis.   Psychiatric: He has a normal mood and affect. His speech is normal.   Vitals reviewed.      NEUROLOGICAL EXAMINATION:     MENTAL STATUS   Oriented to person, place, and time.   Speech: speech is normal     CRANIAL NERVES     CN III, IV, VI   Pupils are equal, round, and reactive to light.  Extraocular motions are normal.       Significant Labs:   Hemoglobin A1c:    Recent Labs   Lab 12/04/19 0454   HGBA1C 7.4*     Blood Culture:   Recent Labs   Lab 12/03/19 1937 12/03/19 2157   LABBLOO No Growth to date  No Growth to date No Growth to date  No Growth to date     CBC:   Recent Labs   Lab 12/03/19 1937 12/04/19 0454 12/05/19  0431   WBC 20.10* 11.07 5.05   HGB 10.8* 9.6* 9.3*   HCT 32.3* 29.6* 28.2*   * 93* 80*     CMP:   Recent Labs   Lab 12/03/19 1937 12/04/19 0454 12/05/19  0431   * 149* 144*   * 139 141   K 3.8 3.6 3.5    110 114*   CO2 24 19* 19*   BUN 32* 24* 20   CREATININE 1.7* 1.2 1.4   CALCIUM 9.9 8.4* 8.5*   PROT 7.0  --   --    ALBUMIN 3.7  --   --    BILITOT 1.3*  --   --    ALKPHOS 81  --   --    AST 20  --   --    ALT 15  --   --    ANIONGAP 9 10 8   EGFRNONAA 42.6* >60.0 53.8*     POCT Glucose:   Recent Labs   Lab 12/04/19  1725 12/05/19  1339   POCTGLUCOSE 121* 187*     Urine Culture:   Recent Labs   Lab 12/03/19 2051   LABURIN GRAM NEGATIVE KENDALL  10,000 - 49,999 cfu/ml  Identification and susceptibility pending  *     Urine Studies:   Recent Labs   Lab 12/03/19 2051   COLORU Beti   APPEARANCEUA Cloudy*   PHUR 5.0   SPECGRAV 1.020   PROTEINUA 2+*   GLUCUA Negative   KETONESU Negative   BILIRUBINUA Negative   OCCULTUA 3+*   NITRITE Negative   LEUKOCYTESUR 2+*   RBCUA 34*   WBCUA 15*   BACTERIA Occasional   HYALINECASTS 17*       Significant Imaging:  Routine EEG (12/5/19):  Preliminary results: subtle intermittent temporal slowing (L>R)  No electrographic seizures or epileptiform discharges that could suggest seizure potential    MRI Brain w/wo (12/4/19):  Chronic microvascular changes and evidence of multiple remote small infarct and micro-hemorrhages  No new infarcts or any other acute intracranial pathology

## 2019-12-05 NOTE — PLAN OF CARE
Bedside swallow study completed. Pt safe for regular diet and thin liquids.   No further speech tx recommended.     JULIO Pena, CCC-SLP  12/5/2019

## 2019-12-05 NOTE — PLAN OF CARE
12/05/19 0919   Post-Acute Status   Post-Acute Authorization Placement   Post-Acute Placement Status Referrals Sent   Referral sent to [facility].

## 2019-12-05 NOTE — ASSESSMENT & PLAN NOTE
Patient with known Hx of CNS vasculitis and subsequent multiple small infarcts in 2017, stable with monthly cytoxan. Did not receive November dose due to plans to transition to alternate medication, which raised concerns for vasculitis flare and new infarcts as the potential etiology to change in mental status.    - MRI of Brain was negative for new infarcts  - continue to hold off on immunosuppressive medications in the setting f infection  - will have close follow up with [facility] center (Dr. Love and Beti Boswell) for decision on future treatment plan

## 2019-12-05 NOTE — SUBJECTIVE & OBJECTIVE
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 Love By mri.  Several active lesions, many old. 5/13: MRA brain/neck, MRI brain w/wo contrast show no vascular occlusion, multiple foci of hyperintensity in deep cerebral periventricular white matter in pattern of demyelinating process.  5/17: MRI spine performed, no spinal cord lesions. Hospitalization 6/2017. EEG was performed negative for seizures.  Repeat MRI showing new lesion, question whether this was demyelination versus CVA. 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 Findings compatible with a small acute lacunar infarct adjacent to the left frontal horn.  Nonspecific enhancement just inferior to this region and extending into the left basal ganglia, as well as within the inferior aspect of the left cerebellum.  No evidence of a focal mass. 2.  Extensive increased signal intensity involving the periventricular white matter compatible with demyelinating disease versus vasculitis. 3.  Clinical correlation and followup recommended. 4.  This reportedly flattened in the EPIC and the corpus callosum medical record system.    Mixed hyperlipidemia 11/9/2012    NAFLD (nonalcoholic fatty liver disease) 10/11/2013    CHASE (nonalcoholic steatohepatitis)     Obesity     Stroke     Type 2 diabetes mellitus with diabetic polyneuropathy, with long-term current use of insulin 5/14/2017    Type 2 diabetes mellitus with hyperglycemia, with long-term current use of insulin 10/11/2013       Past Surgical History:   Procedure Laterality Date    COLONOSCOPY N/A 5/21/2019    Procedure: COLONOSCOPY;  Surgeon: Alex Ascencio MD;  Location: [facility];  Service: Endoscopy;  Laterality: N/A;   confirmed appt-sp    INSERTION OF TUNNELED CENTRAL VENOUS CATHETER (CVC) WITH SUBCUTANEOUS PORT N/A 12/7/2018    Procedure: [id number];  Surgeon: Brucec Diagnostic Provider;  Location: [facility] OR [address] [place] [address];  Service: Radiology;  Laterality: N/A;    SKIN CANCER EXCISION         Review of patient's allergies indicates:  No Known Allergies    Current Neurological Medications: Seroquel + Ritalin    No current facility-administered medications on file prior to encounter.      Current Outpatient Medications on File Prior to Encounter   Medication Sig    alclomethasone (ACLOVATE) 0.05 % cream     alendronate (FOSAMAX) 70 MG tablet Take 1 tablet (70 mg total) by mouth every 7 days.    aspirin (ECOTRIN) 81 MG EC tablet Take 81 mg by mouth once daily.    atenolol (TENORMIN) 50 MG tablet TAKE 1 TABLET BY MOUTH ONCE DAILY    atorvastatin (LIPITOR) 40 MG tablet TAKE 1 TABLET BY MOUTH ONCE DAILY    blood sugar diagnostic (TRUE METRIX GLUCOSE TEST STRIP) Strp Test blood sugar four times a day    DILT- mg CDCR TAKE 1 CAPSULE BY MOUTH ONCE DAILY    donepezil (ARICEPT) 5 MG tablet Take 1 tablet (5 mg total) by mouth every evening. Start with 1/2 tab daily x 1 week then whole tablet maintenance    flash glucose sensor (FREESTYLE ARELY 14 DAY SENSOR) Kit Glucose checking 4 times a day    FREESTYLE ARELY 14 DAY READER Misc GLUCOSE TESTING : FASTING AND PRIOR TO MEALS    insulin (BASAGLAR KWIKPEN U-100 INSULIN) glargine 100 units/mL (3mL) SubQ pen Inject 40 Units into the skin 2 (two) times daily. Up to 60 BID with cytoxan    insulin aspart U-100 (NOVOLOG) 100 unit/mL InPn pen Inject 20 Units into the skin 3 (three) times daily with meals. (Patient taking differently: Inject 20 Units into the skin 4 (four) times daily. )    insulin syringe-needle U-100 (INSULIN SYRINGE) 1/2 mL 30 gauge x 5/16 Syrg Use 3x/day    irbesartan (AVAPRO) 300 MG tablet TAKE 1 TABLET BY MOUTH ONCE DAILY IN THE EVENING    ketoconazole  "(NIZORAL) 2 % cream     lancets 30 gauge Misc Test blood sugar four times a day    liraglutide 0.6 mg/0.1 mL, 18 mg/3 mL, subq PNIJ (VICTOZA 3-TOMASZ) 0.6 mg/0.1 mL (18 mg/3 mL) PnIj Inject 1.8 mg into the skin once daily.    metFORMIN (GLUCOPHAGE-XR) 500 MG 24 hr tablet Take 2 tablets (1,000 mg total) by mouth 2 (two) times daily with meals.    methylphenidate HCl (RITALIN) 10 MG tablet Take 1 tablet (10 mg total) by mouth 2 (two) times daily with meals.    omega-3 fatty acids-vitamin E (FISH OIL) 1,000 mg Cap Take 1 capsule by mouth 2 (two) times daily.    pen needle, diabetic (BD ULTRA-FINE ANA PEN NEEDLE) 32 gauge x 5/32" Ndle 4x daily insulin pen needle, relion    QUEtiapine (SEROQUEL) 25 MG Tab Take 1 tablet (25 mg total) by mouth every evening.    sertraline (ZOLOFT) 100 MG tablet TAKE 1 & 1/2 (ONE & ONE-HALF) TABLETS BY MOUTH ONCE DAILY    VICTOZA 2-TOMASZ 0.6 mg/0.1 mL (18 mg/3 mL) PnIj INJECT 1.8 MG INTO THE SKIN ONCE DAILY    vitamin D 1000 units Tab Take 5 tablets (5,000 Units total) by mouth once daily.     Family History     Problem Relation (Age of Onset)    Cancer Father    Cataracts Mother    Diabetes Maternal Aunt    Heart disease Mother    Heart failure Mother    Hypertension Mother    Rheum arthritis Paternal Grandmother    Stroke Sister        Tobacco Use    Smoking status: Never Smoker    Smokeless tobacco: Never Used   Substance and Sexual Activity    Alcohol use: No     Alcohol/week: 0.0 standard drinks     Frequency: Never     Drinks per session: 1 or 2     Binge frequency: Never    Drug use: No    Sexual activity: Yes     Partners: Female     Review of Systems   Constitutional: Positive for fever. Negative for chills.   HENT: Negative for drooling and trouble swallowing.    Eyes: Negative for visual disturbance.   Respiratory: Negative for choking and shortness of breath.    Cardiovascular: Negative for chest pain and palpitations.   Gastrointestinal: Negative for nausea and " vomiting.   Genitourinary: Positive for frequency.   Musculoskeletal: Positive for gait problem.   Neurological: Negative for facial asymmetry, speech difficulty and weakness.   Psychiatric/Behavioral: Positive for behavioral problems and confusion. Negative for agitation.     Objective:     Vital Signs (Most Recent):  Temp: 98.7 °F (37.1 °C) (12/04/19 1511)  Pulse: 87 (12/04/19 1605)  Resp: 20 (12/04/19 1511)  BP: 119/62 (12/04/19 1511)  SpO2: 95 % (12/04/19 1605) Vital Signs (24h Range):  Temp:  [97.6 °F (36.4 °C)-101.9 °F (38.8 °C)] 98.7 °F (37.1 °C)  Pulse:  [] 87  Resp:  [16-20] 20  SpO2:  [92 %-100 %] 95 %  BP: (119-154)/(59-83) 119/62     Weight: 104.3 kg (230 lb)  Body mass index is 32.54 kg/m².    Physical Exam   Constitutional: He is oriented to person, place, and time. He appears well-developed and well-nourished. He is cooperative. He does not appear ill. No distress.   HENT:   Head: Normocephalic.   Eyes: EOM are normal.   Neck: Normal range of motion.   Cardiovascular: Normal rate.   Pulmonary/Chest: Effort normal. No respiratory distress.   Musculoskeletal: Normal range of motion.   Neurological: He is alert and oriented to person, place, and time.   AAOx3  Speech normal   Facial expression symmetric  EOM intact  Moves all 4 extremities, feeding himself  Patient was gone for the MRI for the afternoon and now is eating, therefore declining a full neuro exam       Skin: He is not diaphoretic. No cyanosis.   Psychiatric: He has a normal mood and affect. His speech is normal and behavior is normal.   Vitals reviewed.      NEUROLOGICAL EXAMINATION:     MENTAL STATUS   Oriented to person, place, and time.   Speech: speech is normal     CRANIAL NERVES     CN III, IV, VI   Extraocular motions are normal.       Significant Labs:   Hemoglobin A1c:   Recent Labs   Lab 12/04/19  0454   HGBA1C 7.4*     Blood Culture:   Recent Labs   Lab 12/03/19  1937 12/03/19 2157   LABBLOO No Growth to date No Growth to  date     CBC:   Recent Labs   Lab 12/03/19  1547 12/03/19 1937 12/04/19  0454   WBC 21.48* 20.10* 11.07   HGB 11.2* 10.8* 9.6*   HCT 33.6* 32.3* 29.6*   * 135* 93*     CMP:   Recent Labs   Lab 12/03/19  1547 12/03/19 1937 12/04/19  0454   * 216* 149*    135* 139   K 4.3 3.8 3.6    102 110   CO2 23 24 19*   BUN 28* 32* 24*   CREATININE 1.6* 1.7* 1.2   CALCIUM 9.6 9.9 8.4*   PROT 7.2 7.0  --    ALBUMIN 3.7 3.7  --    BILITOT 1.3* 1.3*  --    ALKPHOS 84 81  --    AST 20 20  --    ALT 16 15  --    ANIONGAP 11 9 10   EGFRNONAA 45.8* 42.6* >60.0     POCT Glucose:   Recent Labs   Lab 12/04/19  0743 12/04/19  1235 12/04/19  1725   POCTGLUCOSE 156* 125* 121*     Urine Culture:   Recent Labs   Lab 12/03/19 2051   LABURIN GRAM NEGATIVE KENDALL  10,000 - 49,999 cfu/ml  Identification and susceptibility pending  *     Urine Studies:   Recent Labs   Lab 12/03/19 2051   COLORU Beti   APPEARANCEUA Cloudy*   PHUR 5.0   SPECGRAV 1.020   PROTEINUA 2+*   GLUCUA Negative   KETONESU Negative   BILIRUBINUA Negative   OCCULTUA 3+*   NITRITE Negative   LEUKOCYTESUR 2+*   RBCUA 34*   WBCUA 15*   BACTERIA Occasional   HYALINECASTS 17*       Significant Imaging:  MRI Brain w/wo (12/4/19):  Chronic microvascular changes and evidence of multiple remote small infarct and micro-hemorrhages  No new infarcts or any other acute intracranial pathology

## 2019-12-06 LAB
ANION GAP SERPL CALC-SCNC: 11 MMOL/L (ref 8–16)
BACTERIA UR CULT: ABNORMAL
BASOPHILS # BLD AUTO: 0.01 K/UL (ref 0–0.2)
BASOPHILS NFR BLD: 0.3 % (ref 0–1.9)
BUN SERPL-MCNC: 12 MG/DL (ref 8–23)
CALCIUM SERPL-MCNC: 9 MG/DL (ref 8.7–10.5)
CHLORIDE SERPL-SCNC: 110 MMOL/L (ref 95–110)
CO2 SERPL-SCNC: 21 MMOL/L (ref 23–29)
CREAT SERPL-MCNC: 1.1 MG/DL (ref 0.5–1.4)
DIFFERENTIAL METHOD: ABNORMAL
EOSINOPHIL # BLD AUTO: 0 K/UL (ref 0–0.5)
EOSINOPHIL NFR BLD: 0.8 % (ref 0–8)
ERYTHROCYTE [DISTWIDTH] IN BLOOD BY AUTOMATED COUNT: 14.7 % (ref 11.5–14.5)
EST. GFR  (AFRICAN AMERICAN): >60 ML/MIN/1.73 M^2
EST. GFR  (NON AFRICAN AMERICAN): >60 ML/MIN/1.73 M^2
FERRITIN SERPL-MCNC: 185 NG/ML (ref 20–300)
GLUCOSE SERPL-MCNC: 134 MG/DL (ref 70–110)
HCT VFR BLD AUTO: 28.8 % (ref 40–54)
HGB BLD-MCNC: 9.4 G/DL (ref 14–18)
IMM GRANULOCYTES # BLD AUTO: 0.02 K/UL (ref 0–0.04)
IMM GRANULOCYTES NFR BLD AUTO: 0.5 % (ref 0–0.5)
IRON SERPL-MCNC: 35 UG/DL (ref 45–160)
LYMPHOCYTES # BLD AUTO: 0.4 K/UL (ref 1–4.8)
LYMPHOCYTES NFR BLD: 10.1 % (ref 18–48)
MCH RBC QN AUTO: 28.8 PG (ref 27–31)
MCHC RBC AUTO-ENTMCNC: 32.6 G/DL (ref 32–36)
MCV RBC AUTO: 88 FL (ref 82–98)
MONOCYTES # BLD AUTO: 0.3 K/UL (ref 0.3–1)
MONOCYTES NFR BLD: 7.5 % (ref 4–15)
NEUTROPHILS # BLD AUTO: 3.1 K/UL (ref 1.8–7.7)
NEUTROPHILS NFR BLD: 80.8 % (ref 38–73)
NRBC BLD-RTO: 0 /100 WBC
PLATELET # BLD AUTO: 77 K/UL (ref 150–350)
PMV BLD AUTO: 9.8 FL (ref 9.2–12.9)
POCT GLUCOSE: 124 MG/DL (ref 70–110)
POCT GLUCOSE: 143 MG/DL (ref 70–110)
POCT GLUCOSE: 174 MG/DL (ref 70–110)
POTASSIUM SERPL-SCNC: 3.5 MMOL/L (ref 3.5–5.1)
RBC # BLD AUTO: 3.26 M/UL (ref 4.6–6.2)
SATURATED IRON: 11 % (ref 20–50)
SODIUM SERPL-SCNC: 142 MMOL/L (ref 136–145)
TOTAL IRON BINDING CAPACITY: 320 UG/DL (ref 250–450)
TRANSFERRIN SERPL-MCNC: 216 MG/DL (ref 200–375)
WBC # BLD AUTO: 3.87 K/UL (ref 3.9–12.7)

## 2019-12-06 PROCEDURE — 36415 COLL VENOUS BLD VENIPUNCTURE: CPT

## 2019-12-06 PROCEDURE — 63600175 PHARM REV CODE 636 W HCPCS: Performed by: HOSPITALIST

## 2019-12-06 PROCEDURE — 82728 ASSAY OF FERRITIN: CPT

## 2019-12-06 PROCEDURE — 83540 ASSAY OF IRON: CPT

## 2019-12-06 PROCEDURE — 25000003 PHARM REV CODE 250: Performed by: INTERNAL MEDICINE

## 2019-12-06 PROCEDURE — 11000001 HC ACUTE MED/SURG PRIVATE ROOM

## 2019-12-06 PROCEDURE — 97116 GAIT TRAINING THERAPY: CPT

## 2019-12-06 PROCEDURE — 25000003 PHARM REV CODE 250: Performed by: HOSPITALIST

## 2019-12-06 PROCEDURE — 99232 PR SUBSEQUENT HOSPITAL CARE,LEVL II: ICD-10-PCS | Mod: ,,, | Performed by: INTERNAL MEDICINE

## 2019-12-06 PROCEDURE — 80048 BASIC METABOLIC PNL TOTAL CA: CPT

## 2019-12-06 PROCEDURE — 99232 SBSQ HOSP IP/OBS MODERATE 35: CPT | Mod: ,,, | Performed by: INTERNAL MEDICINE

## 2019-12-06 PROCEDURE — 85025 COMPLETE CBC W/AUTO DIFF WBC: CPT

## 2019-12-06 PROCEDURE — 97535 SELF CARE MNGMENT TRAINING: CPT

## 2019-12-06 PROCEDURE — 94761 N-INVAS EAR/PLS OXIMETRY MLT: CPT

## 2019-12-06 PROCEDURE — 97530 THERAPEUTIC ACTIVITIES: CPT

## 2019-12-06 RX ORDER — IRBESARTAN 150 MG/1
300 TABLET ORAL NIGHTLY
Status: DISCONTINUED | OUTPATIENT
Start: 2019-12-06 | End: 2019-12-09 | Stop reason: HOSPADM

## 2019-12-06 RX ORDER — ATENOLOL 50 MG/1
50 TABLET ORAL DAILY
Status: DISCONTINUED | OUTPATIENT
Start: 2019-12-06 | End: 2019-12-07

## 2019-12-06 RX ORDER — CIPROFLOXACIN 500 MG/1
500 TABLET ORAL EVERY 12 HOURS
Status: DISCONTINUED | OUTPATIENT
Start: 2019-12-06 | End: 2019-12-06

## 2019-12-06 RX ADMIN — MICONAZOLE NITRATE: 20 POWDER TOPICAL at 08:12

## 2019-12-06 RX ADMIN — METHYLPHENIDATE HYDROCHLORIDE 10 MG: 5 TABLET ORAL at 08:12

## 2019-12-06 RX ADMIN — LEVOFLOXACIN 750 MG: 500 TABLET, FILM COATED ORAL at 08:12

## 2019-12-06 RX ADMIN — HEPARIN SODIUM 5000 UNITS: 5000 INJECTION, SOLUTION INTRAVENOUS; SUBCUTANEOUS at 05:12

## 2019-12-06 RX ADMIN — SERTRALINE HYDROCHLORIDE 100 MG: 50 TABLET ORAL at 08:12

## 2019-12-06 RX ADMIN — QUETIAPINE FUMARATE 25 MG: 25 TABLET, FILM COATED ORAL at 09:12

## 2019-12-06 RX ADMIN — DILTIAZEM HYDROCHLORIDE 240 MG: 240 CAPSULE, COATED, EXTENDED RELEASE ORAL at 08:12

## 2019-12-06 RX ADMIN — ACETAMINOPHEN 650 MG: 325 TABLET ORAL at 03:12

## 2019-12-06 RX ADMIN — PIPERACILLIN AND TAZOBACTAM 4.5 G: 4; .5 INJECTION, POWDER, FOR SOLUTION INTRAVENOUS at 06:12

## 2019-12-06 RX ADMIN — INSULIN ASPART 2 UNITS: 100 INJECTION, SOLUTION INTRAVENOUS; SUBCUTANEOUS at 03:12

## 2019-12-06 RX ADMIN — INSULIN DETEMIR 20 UNITS: 100 INJECTION, SOLUTION SUBCUTANEOUS at 08:12

## 2019-12-06 RX ADMIN — IRBESARTAN 300 MG: 150 TABLET, FILM COATED ORAL at 09:12

## 2019-12-06 RX ADMIN — METHYLPHENIDATE HYDROCHLORIDE 10 MG: 5 TABLET ORAL at 06:12

## 2019-12-06 RX ADMIN — DONEPEZIL HYDROCHLORIDE 5 MG: 5 TABLET, FILM COATED ORAL at 09:12

## 2019-12-06 RX ADMIN — ATORVASTATIN CALCIUM 40 MG: 20 TABLET, FILM COATED ORAL at 08:12

## 2019-12-06 RX ADMIN — ATENOLOL 50 MG: 50 TABLET ORAL at 10:12

## 2019-12-06 RX ADMIN — ASPIRIN 81 MG: 81 TABLET, COATED ORAL at 08:12

## 2019-12-06 NOTE — PT/OT/SLP PROGRESS
Occupational Therapy   Treatment    Name: Bessy Nunez  MRN: [id number]  Admitting Diagnosis:  Encephalopathy       Recommendations:     Discharge Recommendations: nursing facility, skilled  Discharge Equipment Recommendations:  walker, rolling  Barriers to discharge:  None    Assessment:     Bessy Nunez is a 59 y.o. male with a medical diagnosis of Encephalopathy.  Performance deficits affecting function are weakness, impaired endurance, impaired self care skills, impaired functional mobilty, gait instability, impaired balance, decreased coordination, impaired coordination, impaired cognition, decreased upper extremity function, decreased lower extremity function, decreased safety awareness, impaired fine motor.     Rehab Prognosis:  Good; patient would benefit from acute skilled OT services to address these deficits and reach maximum level of function.       Plan:     Patient to be seen 3 x/week to address the above listed problems via self-care/home management, therapeutic activities, therapeutic exercises, cognitive retraining, neuromuscular re-education  · Plan of Care Expires: 01/04/20  · Plan of Care Reviewed with: patient, spouse    Subjective     Pain/Comfort:  · Pain Rating 1: 0/10  · Pain Rating Post-Intervention 1: 0/10    Objective:     Communicated with: patient prior to session.  Patient found supine with telemetry, peripheral IV upon OT entry to room.    General Precautions: Standard, fall   Orthopedic Precautions:N/A   Braces: N/A     Occupational Performance:     Bed Mobility:    · Patient completed Rolling/Turning to Left with  minimum assistance  · Patient completed Rolling/Turning to Right with minimum assistance  · Patient completed Supine to Sit with minimum assistance    Functional Mobility/Transfers:  · Patient completed Sit <> Stand Transfer with contact guard assistance  with  rolling walker   · Patient completed Bed > Chair Transfer using Stand Pivot technique with CGA with rolling  walker    Patient and wife educated on importance of hand placement for safety for sit<>stands and functional transfers when using RW.     Activities of Daily Living:  · Feeding:  independence    · Upper Body Dressing: minimum assistance Donning back gown sitting up in chair  · Lower Body Dressing: supervision Maynard and donning socks  · Toileting: total assistance Patient was soiled from incontinence and having a BM needing to be cleaned prior to OOB activity      [facility] 6 Click ADL: 16    Treatment & Education:  Role of OT and POC  Safety  ADL retraining  Functional mobility training    Patient left up in chair with call button in reach, wife present and all needs met (nurse notified)Education:      GOALS:   Multidisciplinary Problems     Occupational Therapy Goals        Problem: Occupational Therapy Goal    Goal Priority Disciplines Outcome Interventions   Occupational Therapy Goal     OT, PT/OT Ongoing, Progressing    Description:  Goals to be met by: 12/12/19     Patient will increase functional independence with ADLs by performing:    UE Dressing with Stand-by Assistance.  LE Dressing with Moderate Assistance. Met  Grooming while standing with Moderate Assistance.  Toileting from toilet with Minimal Assistance for hygiene and clothing management.   Rolling to Bilateral with Moderate Assistance. Met   Supine to sit with Moderate Assistance. Met  GOAL revised: Supine to sit with modified independence.   Step transfer with Contact Guard Assistance Met  GOAL revised: Step transfer with supervision.   Toilet transfer to toilet with Contact Guard Assistance.                        Time Tracking:     OT Date of Treatment: 12/06/19  OT Start Time: 0914  OT Stop Time: 0938  OT Total Time (min): 24 min    Billable Minutes:Self Care/Home Management 24    COLTON Sepulveda  12/6/2019

## 2019-12-06 NOTE — PLAN OF CARE
Problem: Occupational Therapy Goal  Goal: Occupational Therapy Goal  Description  Goals to be met by: 12/12/19     Patient will increase functional independence with ADLs by performing:    UE Dressing with Stand-by Assistance.  LE Dressing with Moderate Assistance. Met  Grooming while standing with Moderate Assistance.  Toileting from toilet with Minimal Assistance for hygiene and clothing management.   Rolling to Bilateral with Moderate Assistance. Met   Supine to sit with Moderate Assistance. Met  GOAL revised: Supine to sit with modified independence.   Step transfer with Contact Guard Assistance Met  GOAL revised: Step transfer with supervision.   Toilet transfer to toilet with Contact Guard Assistance.       Outcome: Ongoing, Progressing  Patient's goals are appropriate.   COLTON Sepulveda  12/6/2019

## 2019-12-06 NOTE — PROGRESS NOTES
Hospital Medicine  Progress Note      Patient Name: Bessy Nunez  MRN: [id number]  Date of Admission: 12/3/2019     Principal Problem: Encephalopathy     Subjective     Afebrile overnight. Mental status at baseline per wife. Labs remarkable for thrombocytopenia (139 on admission, now 77). Has chronic thrombocytopenia of unclear etiology. Heparin discontinued today. Start SCDs. UCx ecoli. BCx NGTD. Medically stable for SNF.       Review of Systems      Constitutional:NEGATIVE for chills, fatigue, fever.   HENT: Negative for sore throat, trouble swallowing.    Eyes: Negative for photophobia, visual disturbance.   Respiratory: Negative for cough, shortness of breath.    Cardiovascular: Negative for chest pain, palpitations, leg swelling.   Gastrointestinal: Negative for abdominal pain, constipation, diarrhea, nausea, vomiting.   Endocrine: Negative for cold intolerance, heat intolerance.   Genitourinary: Negative for dysuria, frequency.   Musculoskeletal: Negative for arthralgias, myalgias.   Skin: Negative for rash, wound, erythema   Neurological: Negative for dizziness, syncope, weakness, light-headedness.   Psychiatric/Behavioral: POSITIVE for confusion; improved    Medications  Scheduled Meds:   aspirin  81 mg Oral Daily    atenolol  50 mg Oral Daily    atorvastatin  40 mg Oral Daily    diltiaZEM  240 mg Oral Daily    donepezil  5 mg Oral QHS    insulin detemir U-100  20 Units Subcutaneous Daily    irbesartan  300 mg Oral QHS    levoFLOXacin  750 mg Oral Daily    methylphenidate HCl  10 mg Oral BID WM    miconazole NITRATE 2 %   Topical (Top) BID    QUEtiapine  25 mg Oral QHS    sertraline  100 mg Oral Daily     Continuous Infusions:  PRN Meds:.acetaminophen, albuterol-ipratropium, Dextrose 10% Bolus, Dextrose 10% Bolus, glucagon (human recombinant), glucose, glucose, insulin aspart U-100, melatonin, ondansetron, prochlorperazine, sodium chloride 0.9%    Objective    Physical Examination    Temp:  [98.1  °F (36.7 °C)-99.6 °F (37.6 °C)]   Pulse:  [66-98]   Resp:  [17-18]   BP: (120-181)/(57-95)   SpO2:  [90 %-95 %]     Gen: NAD, conversant  Head: NC, AT  Eyes: PERRLA, EOMI  Throat: MMM, OP clear  CV: RRR, no M/R/G, no peripheral edema, no JVD  Resp: CTAB, no increased work of breathing on room air  GI: Soft, NT, ND, +BS  Ext: MAEW, no c/c/e  Skin: erythematous skin at groin and lower abdomen  Neuro: AAOx2, not year, but knew his age, CN grossly intact, no focal neurologic deficits  Psychiatry: Normal mood, normal affect, no SI/HI    CBC  Recent Labs   Lab 12/04/19  0454 12/05/19  0431 12/06/19  0446   WBC 11.07 5.05 3.87*   HGB 9.6* 9.3* 9.4*   HCT 29.6* 28.2* 28.8*   PLT 93* 80* 77*     CMP  Recent Labs   Lab 12/03/19  1547 12/03/19  1937 12/04/19  0454 12/05/19  0431 12/06/19  0446    135* 139 141 142   K 4.3 3.8 3.6 3.5 3.5    102 110 114* 110   CO2 23 24 19* 19* 21*   BUN 28* 32* 24* 20 12   CREATININE 1.6* 1.7* 1.2 1.4 1.1   * 216* 149* 144* 134*   CALCIUM 9.6 9.9 8.4* 8.5* 9.0   ALKPHOS 84 81  --   --   --    ALT 16 15  --   --   --    AST 20 20  --   --   --    ALBUMIN 3.7 3.7  --   --   --    PROT 7.2 7.0  --   --   --    BILITOT 1.3* 1.3*  --   --   --            Hospital Course:  Admitted o  on 12/4 for acute encephalopathy and sepsis due to UTI.  Started empirically on zosyn. Febrile overnight. Wife concerned about worsening mental status this morning and seizure like activity (rt leg shaking and tachypnea with unresponsiveness) in the morning. Neurology consulted and recommended MRI w/wo contrast and EEG. Disoriented to time, but awakens and answers some questions appropriately during eval. Denies dysuria, N/V, SOB. MRI brain w chronic ischemic change, no evidence of recent infarction or other acute intracranial pathology. Abx de-escalated to levaquin to cover for possible lower resp infection vs UTI. BCx NGTD. Labs significant for resolution of UTI and worsening thrombocytopenia.  Heparin subQ discontinued, however, has had plts as low as 78 in the past.     Assessment and Plan:    Severe Sepsis 2/2 UTI  Complicated UTI  -Pt. With low grade fever, WBC 20, and lactate elevated to 2.8  -CXR without consolidation, no URI symptoms  -U/A with 15 WBC, LE +, but nitrite negative. Possible source  - urine culture - GNR  - blood cultures- NGTD  --flu negative  - zosyn switched to levaquin (also with URI sx) to complete 10 days (complicated UTI)         Acute Encephalopathy  --pt w hx of cns vasculitis and baseline neurocognitive deficits, likely exacerbated by infection  --neuro consulted given seizure-like activity per wife and >1wk hx of AMS; appreciate assistance  --MRI without acute intracranial pathology  --EEG neg for seizures  --continue empiric abx  --IMPROVED       CNS Vasculitis  -Pt. Followed outpatient by neurology, continue cytoxan monthly  -Continue meds donepezil, sertraline, seroquel, and methylphenidate for mentation  - neuro consulted; f/u outpt  --MRI unremarkable     Intertrigo  -Rash noted in folds of groin, will treat with topical miconazole  -Monitor     MANISH  -Baseline Cr ~1.0, pt. Presents with Cr 1.7-->1.4  -Suspect prerenal source in setting of sepsis  -resolved with IVFs  -resume ARB  - daily bmp     DM2  - Last HbA1c: 6.2 on 4/2019; now 7.4  - Diabetic diet with SSI insulin ordered  -Lantus 40 units BID listed as home dose, pt. Reports only being on 40 unitsdaily and wife states he misses that dose sometimes  -Levemir 20 units for now, uptitrate as needed     Hypertension:  - continue atenolol, diltiazem.   - resume irbesartan today     Dyslipidemia:  - continue atorvastatin     Hx of CVA:  - continue asa and atorvastatin    Thrombocytopenia  --appears to be a chronic problem, however, worsening  --plts 77; 139 on admission  --as low as 78 on 7/18/19- at the time thought to be due to cytoxan (on dose #24)  --pt has had 28 doses of cytoxan which can have bone marrow  toxicity, so if worsening will consult heme  --hold heparin as has basically dropped by 50% since admission,   --f/u cbc in AM and if worsening order full workup      Diet: diabetic  VTE PPX: heparin    Goals of care: full  Dispo: SNF pending clinical improvement    Josefina Urena M.D.  Department of Hospital Medicine  [facility]  [phone] (pager)

## 2019-12-06 NOTE — PLAN OF CARE
12/06/19 1550   Post-Acute Status   Post-Acute Authorization Placement;Other   Other Status See Comments     SW uploaded the 142 into right care.    Gem Michaels LMSW  [facility]   [phone]

## 2019-12-06 NOTE — PLAN OF CARE
Problem: Fall Injury Risk  Goal: Absence of Fall and Fall-Related Injury  Outcome: Ongoing, Progressing     Problem: Adult Inpatient Plan of Care  Goal: Plan of Care Review  Outcome: Ongoing, Progressing     Problem: Adult Inpatient Plan of Care  Goal: Optimal Comfort and Wellbeing  Outcome: Ongoing, Progressing     Problem: Diabetes Comorbidity  Goal: Blood Glucose Level Within Desired Range  Outcome: Ongoing, Progressing     Problem: Skin Injury Risk Increased  Goal: Skin Health and Integrity  Outcome: Ongoing, Progressing

## 2019-12-06 NOTE — PLAN OF CARE
12/06/19 0948   Post-Acute Status   Post-Acute Authorization Placement;Other   Other Status See Comments     SW spoke with Brina admissions coordinator with [facility], and she reports that she does not have placement for today but states that she will reevaluate patient on Monday. JULIAN will continue to follow up.    Gem Michaels LMSW  [facility]   [phone]

## 2019-12-06 NOTE — PLAN OF CARE
1 goal met. Goals remain appropriate.     Problem: Physical Therapy Goal  Goal: Physical Therapy Goal  Description  Goals to be met by: 19     Patient will increase functional independence with mobility by performin. Supine to sit with MInimal Assistance  2. Sit to supine with MInimal Assistance  3. Sit to stand transfer with Contact Guard Assistance with LRAD or no AD  4. Bed to chair transfer with Contact Guard Assistance using LRAD or no AD  5. Gait  x 25 feet with Contact Guard Assistance using LRAD or no AD. - met  6. Lower extremity exercise program x20 reps per handout, with assistance as needed  7. Stand for 1 minutes with Contact Guard Assistance with no AD to improve balance for standing ADLs      Outcome: Ongoing, Progressing

## 2019-12-06 NOTE — PLAN OF CARE
12/06/19 1106   Post-Acute Status   Post-Acute Authorization Placement   Post-Acute Placement Status Referrals Sent     SW met with patient at the bedside to obtain more SNF choices. SW sent the referrals via [facility] and will follow up.Patients preference is unable to admit to [facility] he states is the [facility].    Gem Michaels LMSW  [facility]   [id number]

## 2019-12-06 NOTE — PLAN OF CARE
12/06/19 1153   Post-Acute Status   Post-Acute Authorization Placement;Other   Other Status See Comments     SW contacted the state to complete the locet, uploaded the PASSR into  and awaiting the 142.    Gem Michaels LMSW  [facility]   [phone]

## 2019-12-06 NOTE — PHYSICIAN QUERY
PT Name: Bessy Nunez  MR #: [id number]     CDS/: ROSE Escobar, RN, CDS               Contact information:[email or web address]  This form is a permanent document in the medical record.     Query Date: December 6, 2019    Physician Query - Neurological Condition Clarification    By submitting this query, we are merely seeking further clarification of documentation to reflect the severity of illness of your patient. Please utilize your independent clinical judgment when addressing the question(s) below.    The Medical record reflects the following:     Indicators   Supporting Clinical Findings Location in Medical Record   X AMS, Confusion,  LOC, etc.   He has been more confused lately than usual from his baseline with hx of CNS vasculitis     Positive for confusion.      Alert to person, but not year (2020) and place (psychiatric dept.). Answering all questions and following commands     Patient's wife reported that the patient had been acting unusually over the last week     He is alert to person and situation but not year.     Acute Encephalopathy, Improved     Wife concerned about worsening mental status this morning and seizure like activity      Mrs. Nunez indicates that since starting antibiotics she has noted a gradual improvement in Mr. Nunez's mentation     He reports approaching baseline but is not quite feeling back to normal.     Urosepsis is most likely the etiology to AMS    ED provider note, CHUNG Nolasco PA-C/ Dr. Traylor, 12/3               H&P, Dr. Carter, 12/3            , Dr. Urena, 12/4            Neurology, Dr. Suresh, 12/4       Neurology, Dr. Suresh, 12/5   X Acute / Chronic Illness  CNS Vasculitis (on cytoxan monthly), DM2, and Hx of CVA, Severe Sepsis 2/2 UTI  Acute Encephalopathy, MANISH    H&P, Dr. Carter, 12/3   X Radiology Findings  MRI without new strokes and EEG negative for seizures.    Neurology, Dr. Suresh, 12/5      Electrolyte Imbalance     X Medication   continue cytoxan monthly    H&P, Dr. Carter, 12/3   X Treatment          IV fluids given with normalization of lactate, continue with IV NS at 100 cc/hr     Start IV zosyn      Neuro consulted, MRI, EEG  H&P, Dr. Carter, 12/3         , Dr. Urena, 12/4    Other       Encephalopathy- is a general term for any diffuse disease of the brain that alters brain function or structure. Treatment of the cognitive dysfunction varies but is ultimately dependent on the treatment of the underlying condition.    Major Symptoms of Encephalopathy - Decreased level of consciousness, fluctuating alertness/concentration, confusion, agitation, lethargy, somnolence, drowsiness, obtundation, stupor, or coma.         References: National Institutes of Healths (NIH) [place] of Neurological Disorders and Strokes;  HCPro 2016; Advisory Board     Clinical Guidelines:   These guidelines will set system standards to assist providers in managing, documentation, and coding of encephalopathy. The intent of this document is to serve as a system guideline, not replace the providers clinical judgment:  Provider, please specify the diagnosis or diagnoses associated with above clinical findings.    Doctor, please further specify the encephalopathy.    [  x ] Metabolic Encephalopathy - Due to electrolye imbalance, metabolic derangements, or infections processes, includes Septic Encephalopathy   [   ] Other Encephalopathy - Includes uremic encephalopathy   [   ] Unspecified Encephalopathy      [   ] Other Neurological Condition-  Includes Post-ictal altered mental status. (please specify condition): _________   [   ]  Clinically Undetermined     Please document in your progress notes daily for the duration of treatment until resolved, and include in your discharge summary.

## 2019-12-06 NOTE — PT/OT/SLP PROGRESS
Physical Therapy Treatment    Patient Name:  Bessy Nunez   MRN:  [id number]    Recommendations:     Discharge Recommendations:  nursing facility, skilled   Discharge Equipment Recommendations: walker, rolling   Barriers to discharge: None    Assessment:     Bessy Nunez is a 59 y.o. male admitted with a medical diagnosis of Encephalopathy.  He presents with the following impairments/functional limitations:  weakness, impaired endurance, impaired self care skills, impaired functional mobilty, gait instability, impaired balance, impaired cognition, decreased coordination, decreased upper extremity function, decreased lower extremity function, decreased safety awareness, impaired coordination, impaired fine motor. Pt tolerated session well with focus on transfers and gait training. Pt already UIC after working with OT on this therapist entry. Pt agreeable to gait training. Pt mobilizing with Min to CGA for various transfers and balance with ambulation. Pt will continue to benefit from therapy services to address impairments listed above.     Rehab Prognosis: Good; patient would benefit from acute skilled PT services to address these deficits and reach maximum level of function.    Recent Surgery: * No surgery found *      Plan:     During this hospitalization, patient to be seen 3 x/week to address the identified rehab impairments via gait training, therapeutic activities, therapeutic exercises, neuromuscular re-education and progress toward the following goals:    · Plan of Care Expires:  01/01/20    Subjective     Chief Complaint: no c/o  Pain/Comfort:  ·  no pain      Objective:     Communicated with NSG prior to session.  Patient found up in chair with telemetry upon PTA entry to room. Spouse at bedside.    General Precautions: Standard, fall   Orthopedic Precautions:N/A   Braces: N/A     Functional Mobility:  · Transfers:     · Sit to Stand:  minimum assistance with rolling walker  · Gait: Pt ambulates ~165 ft  with RW and CGA. Pt with slow justin, decreased B step length, and decreased safety awareness. Pt steady with no LOB despite gait deficits.       AM-PAC 6 CLICK MOBILITY  Turning over in bed (including adjusting bedclothes, sheets and blankets)?: 3  Sitting down on and standing up from a chair with arms (e.g., wheelchair, bedside commode, etc.): 3  Moving from lying on back to sitting on the side of the bed?: 3  Moving to and from a bed to a chair (including a wheelchair)?: 3  Need to walk in hospital room?: 3  Climbing 3-5 steps with a railing?: 2  Basic Mobility Total Score: 17       Therapeutic Activities and Exercises:   Pt assisted with functional mobility as noted above.   Pt performs sit<>stand with RW and Min A x 3 attempts. Cues for hand placement and forward weight shift.     Patient left up in chair with all lines intact, call button in reach, NSG notified and spouse present..    GOALS:   Multidisciplinary Problems     Physical Therapy Goals        Problem: Physical Therapy Goal    Goal Priority Disciplines Outcome Goal Variances Interventions   Physical Therapy Goal     PT, PT/OT Ongoing, Progressing     Description:  Goals to be met by: 19     Patient will increase functional independence with mobility by performin. Supine to sit with MInimal Assistance  2. Sit to supine with MInimal Assistance  3. Sit to stand transfer with Contact Guard Assistance with LRAD or no AD  4. Bed to chair transfer with Contact Guard Assistance using LRAD or no AD  5. Gait  x 25 feet with Contact Guard Assistance using LRAD or no AD. - met  6. Lower extremity exercise program x20 reps per handout, with assistance as needed  7. Stand for 1 minutes with Contact Guard Assistance with no AD to improve balance for standing ADLs                       Time Tracking:     PT Received On: 19  PT Start Time: 1002     PT Stop Time: 1025  PT Total Time (min): 23 min     Billable Minutes: Gait Training 13 and  Therapeutic Activity 10    Treatment Type: Treatment  PT/PTA: PTA     PTA Visit Number: 1     Reynaldo Valladares, ANNALISE  12/06/2019

## 2019-12-07 LAB
ANION GAP SERPL CALC-SCNC: 8 MMOL/L (ref 8–16)
ANISOCYTOSIS BLD QL SMEAR: SLIGHT
BASOPHILS # BLD AUTO: 0.01 K/UL (ref 0–0.2)
BASOPHILS NFR BLD: 0.2 % (ref 0–1.9)
BUN SERPL-MCNC: 15 MG/DL (ref 8–23)
BURR CELLS BLD QL SMEAR: ABNORMAL
CALCIUM SERPL-MCNC: 9.3 MG/DL (ref 8.7–10.5)
CHLORIDE SERPL-SCNC: 109 MMOL/L (ref 95–110)
CO2 SERPL-SCNC: 25 MMOL/L (ref 23–29)
CREAT SERPL-MCNC: 1 MG/DL (ref 0.5–1.4)
DACRYOCYTES BLD QL SMEAR: ABNORMAL
DIFFERENTIAL METHOD: ABNORMAL
EOSINOPHIL # BLD AUTO: 0.1 K/UL (ref 0–0.5)
EOSINOPHIL NFR BLD: 2.1 % (ref 0–8)
ERYTHROCYTE [DISTWIDTH] IN BLOOD BY AUTOMATED COUNT: 14.8 % (ref 11.5–14.5)
EST. GFR  (AFRICAN AMERICAN): >60 ML/MIN/1.73 M^2
EST. GFR  (NON AFRICAN AMERICAN): >60 ML/MIN/1.73 M^2
GLUCOSE SERPL-MCNC: 128 MG/DL (ref 70–110)
HCT VFR BLD AUTO: 29.9 % (ref 40–54)
HGB BLD-MCNC: 9.9 G/DL (ref 14–18)
IMM GRANULOCYTES # BLD AUTO: 0.03 K/UL (ref 0–0.04)
IMM GRANULOCYTES NFR BLD AUTO: 0.6 % (ref 0–0.5)
INR PPP: 1 (ref 0.8–1.2)
LYMPHOCYTES # BLD AUTO: 0.7 K/UL (ref 1–4.8)
LYMPHOCYTES NFR BLD: 14.7 % (ref 18–48)
MCH RBC QN AUTO: 29.1 PG (ref 27–31)
MCHC RBC AUTO-ENTMCNC: 33.1 G/DL (ref 32–36)
MCV RBC AUTO: 88 FL (ref 82–98)
MONOCYTES # BLD AUTO: 0.6 K/UL (ref 0.3–1)
MONOCYTES NFR BLD: 13.2 % (ref 4–15)
NEUTROPHILS # BLD AUTO: 3.2 K/UL (ref 1.8–7.7)
NEUTROPHILS NFR BLD: 69.2 % (ref 38–73)
NRBC BLD-RTO: 0 /100 WBC
OVALOCYTES BLD QL SMEAR: ABNORMAL
PLATELET # BLD AUTO: 94 K/UL (ref 150–350)
PMV BLD AUTO: 9.7 FL (ref 9.2–12.9)
POCT GLUCOSE: 133 MG/DL (ref 70–110)
POCT GLUCOSE: 147 MG/DL (ref 70–110)
POCT GLUCOSE: 247 MG/DL (ref 70–110)
POCT GLUCOSE: 280 MG/DL (ref 70–110)
POIKILOCYTOSIS BLD QL SMEAR: SLIGHT
POTASSIUM SERPL-SCNC: 4.2 MMOL/L (ref 3.5–5.1)
PROTHROMBIN TIME: 10 SEC (ref 9–12.5)
RBC # BLD AUTO: 3.4 M/UL (ref 4.6–6.2)
SODIUM SERPL-SCNC: 142 MMOL/L (ref 136–145)
WBC # BLD AUTO: 4.69 K/UL (ref 3.9–12.7)

## 2019-12-07 PROCEDURE — 99232 PR SUBSEQUENT HOSPITAL CARE,LEVL II: ICD-10-PCS | Mod: ,,, | Performed by: INTERNAL MEDICINE

## 2019-12-07 PROCEDURE — 25000003 PHARM REV CODE 250: Performed by: INTERNAL MEDICINE

## 2019-12-07 PROCEDURE — 85610 PROTHROMBIN TIME: CPT

## 2019-12-07 PROCEDURE — 80048 BASIC METABOLIC PNL TOTAL CA: CPT

## 2019-12-07 PROCEDURE — 25000003 PHARM REV CODE 250: Performed by: HOSPITALIST

## 2019-12-07 PROCEDURE — 85025 COMPLETE CBC W/AUTO DIFF WBC: CPT

## 2019-12-07 PROCEDURE — 99232 SBSQ HOSP IP/OBS MODERATE 35: CPT | Mod: ,,, | Performed by: INTERNAL MEDICINE

## 2019-12-07 PROCEDURE — 36415 COLL VENOUS BLD VENIPUNCTURE: CPT

## 2019-12-07 PROCEDURE — 11000001 HC ACUTE MED/SURG PRIVATE ROOM

## 2019-12-07 RX ORDER — ATENOLOL 50 MG/1
50 TABLET ORAL DAILY
Status: DISCONTINUED | OUTPATIENT
Start: 2019-12-07 | End: 2019-12-09 | Stop reason: HOSPADM

## 2019-12-07 RX ORDER — HYDRALAZINE HYDROCHLORIDE 25 MG/1
25 TABLET, FILM COATED ORAL EVERY 8 HOURS PRN
Status: DISCONTINUED | OUTPATIENT
Start: 2019-12-07 | End: 2019-12-09 | Stop reason: HOSPADM

## 2019-12-07 RX ADMIN — METHYLPHENIDATE HYDROCHLORIDE 10 MG: 5 TABLET ORAL at 05:12

## 2019-12-07 RX ADMIN — DILTIAZEM HYDROCHLORIDE 240 MG: 240 CAPSULE, COATED, EXTENDED RELEASE ORAL at 09:12

## 2019-12-07 RX ADMIN — DONEPEZIL HYDROCHLORIDE 5 MG: 5 TABLET, FILM COATED ORAL at 08:12

## 2019-12-07 RX ADMIN — METHYLPHENIDATE HYDROCHLORIDE 10 MG: 5 TABLET ORAL at 08:12

## 2019-12-07 RX ADMIN — ATORVASTATIN CALCIUM 40 MG: 20 TABLET, FILM COATED ORAL at 09:12

## 2019-12-07 RX ADMIN — IRBESARTAN 300 MG: 150 TABLET, FILM COATED ORAL at 08:12

## 2019-12-07 RX ADMIN — Medication 6 MG: at 09:12

## 2019-12-07 RX ADMIN — MICONAZOLE NITRATE: 20 POWDER TOPICAL at 08:12

## 2019-12-07 RX ADMIN — QUETIAPINE FUMARATE 25 MG: 25 TABLET, FILM COATED ORAL at 08:12

## 2019-12-07 RX ADMIN — INSULIN ASPART 4 UNITS: 100 INJECTION, SOLUTION INTRAVENOUS; SUBCUTANEOUS at 05:12

## 2019-12-07 RX ADMIN — ASPIRIN 81 MG: 81 TABLET, COATED ORAL at 09:12

## 2019-12-07 RX ADMIN — SERTRALINE HYDROCHLORIDE 100 MG: 50 TABLET ORAL at 09:12

## 2019-12-07 RX ADMIN — ATENOLOL 50 MG: 50 TABLET ORAL at 09:12

## 2019-12-07 RX ADMIN — INSULIN ASPART 3 UNITS: 100 INJECTION, SOLUTION INTRAVENOUS; SUBCUTANEOUS at 09:12

## 2019-12-07 RX ADMIN — MICONAZOLE NITRATE: 20 POWDER TOPICAL at 10:12

## 2019-12-07 RX ADMIN — LEVOFLOXACIN 750 MG: 500 TABLET, FILM COATED ORAL at 09:12

## 2019-12-07 RX ADMIN — INSULIN DETEMIR 20 UNITS: 100 INJECTION, SOLUTION SUBCUTANEOUS at 09:12

## 2019-12-07 NOTE — PROGRESS NOTES
Hospital Medicine  Progress Note      Patient Name: Bessy Nunez  MRN: [id number]  Date of Admission: 12/3/2019     Principal Problem: Encephalopathy     Subjective     Hypertensive overnight. Home meds resumed yesterday. Feeling well this morning with mental status at baseline. Continuing 7-10 days levaquin for complicated UTI. Thrombocytopenia improving. Still holding heparin. Medically stabled for SNF.      Review of Systems      Constitutional:NEGATIVE for chills, fatigue, fever.   HENT: Negative for sore throat, trouble swallowing.    Eyes: Negative for photophobia, visual disturbance.   Respiratory: Negative for cough, shortness of breath.    Cardiovascular: Negative for chest pain, palpitations, leg swelling.   Gastrointestinal: Negative for abdominal pain, constipation, diarrhea, nausea, vomiting.   Endocrine: Negative for cold intolerance, heat intolerance.   Genitourinary: Negative for dysuria, frequency.   Musculoskeletal: Negative for arthralgias, myalgias.   Skin: Negative for rash, wound, erythema   Neurological: Negative for dizziness, syncope, weakness, light-headedness.   Psychiatric/Behavioral: POSITIVE for confusion; improved    Medications  Scheduled Meds:   aspirin  81 mg Oral Daily    atenolol  50 mg Oral Daily    atorvastatin  40 mg Oral Daily    diltiaZEM  240 mg Oral Daily    donepezil  5 mg Oral QHS    insulin detemir U-100  20 Units Subcutaneous Daily    irbesartan  300 mg Oral QHS    levoFLOXacin  750 mg Oral Daily    methylphenidate HCl  10 mg Oral BID WM    miconazole NITRATE 2 %   Topical (Top) BID    QUEtiapine  25 mg Oral QHS    sertraline  100 mg Oral Daily     Continuous Infusions:  PRN Meds:.acetaminophen, albuterol-ipratropium, Dextrose 10% Bolus, Dextrose 10% Bolus, glucagon (human recombinant), glucose, glucose, hydrALAZINE, insulin aspart U-100, melatonin, ondansetron, prochlorperazine, sodium chloride 0.9%    Objective    Physical Examination    Temp:  [96.9 °F  (36.1 °C)-99.2 °F (37.3 °C)]   Pulse:  [63-74]   Resp:  [17-20]   BP: (131-184)/()   SpO2:  [92 %-99 %]     Gen: NAD, conversant  Head: NC, AT  Eyes: PERRLA, EOMI  Throat: MMM, OP clear  CV: RRR, no M/R/G, no peripheral edema, no JVD  Resp: CTAB, no increased work of breathing on room air  GI: Soft, NT, ND, +BS  Ext: MAEW, no c/c/e  Skin: erythematous skin at groin and lower abdomen  Neuro: AAOx2, not year, but knew his age, CN grossly intact, no focal neurologic deficits  Psychiatry: Normal mood, normal affect, no SI/HI    CBC  Recent Labs   Lab 12/05/19  0431 12/06/19  0446 12/07/19  0457   WBC 5.05 3.87* 4.69   HGB 9.3* 9.4* 9.9*   HCT 28.2* 28.8* 29.9*   PLT 80* 77* 94*     CMP  Recent Labs   Lab 12/03/19  1547 12/03/19  1937  12/05/19  0431 12/06/19  0446 12/07/19  0457    135*   < > 141 142 142   K 4.3 3.8   < > 3.5 3.5 4.2    102   < > 114* 110 109   CO2 23 24   < > 19* 21* 25   BUN 28* 32*   < > 20 12 15   CREATININE 1.6* 1.7*   < > 1.4 1.1 1.0   * 216*   < > 144* 134* 128*   CALCIUM 9.6 9.9   < > 8.5* 9.0 9.3   ALKPHOS 84 81  --   --   --   --    ALT 16 15  --   --   --   --    AST 20 20  --   --   --   --    ALBUMIN 3.7 3.7  --   --   --   --    PROT 7.2 7.0  --   --   --   --    BILITOT 1.3* 1.3*  --   --   --   --    INR  --   --   --   --   --  1.0    < > = values in this interval not displayed.           Hospital Course:  Admitted o HM on 12/4 for acute encephalopathy and sepsis due to UTI.  Started empirically on zosyn. Febrile overnight. Wife concerned about worsening mental status this morning and seizure like activity (rt leg shaking and tachypnea with unresponsiveness) in the morning. Neurology consulted and recommended MRI w/wo contrast and EEG. Disoriented to time, but awakens and answers some questions appropriately during eval. Denies dysuria, N/V, SOB. MRI brain w chronic ischemic change, no evidence of recent infarction or other acute intracranial pathology. Abx  de-escalated to levaquin to cover for possible lower resp infection vs UTI. BCx NGTD. Labs significant for resolution of UTI and worsening thrombocytopenia. Heparin subQ discontinued, however, has had plts as low as 78 in the past. On 12/7 platelet count improving.     Assessment and Plan:    Severe Sepsis 2/2 UTI  Complicated UTI  -Pt. With low grade fever, WBC 20, and lactate elevated to 2.8  -CXR without consolidation, no URI symptoms  -U/A with 15 WBC, LE +, but nitrite negative. Possible source  - urine culture - GNR  - blood cultures- NGTD  --flu negative  - zosyn switched to levaquin (also with URI sx) to complete 10 days (complicated UTI); continue         Acute Encephalopathy  --pt w hx of cns vasculitis and baseline neurocognitive deficits, likely exacerbated by infection  --neuro consulted given seizure-like activity per wife and >1wk hx of AMS; appreciate assistance  --MRI without acute intracranial pathology  --EEG neg for seizures  --continue empiric abx  --RESOLVED  --outpt neuro f/u       CNS Vasculitis  -Pt. Followed outpatient by neurology, continue cytoxan monthly  -Continue meds donepezil, sertraline, seroquel, and methylphenidate for mentation  - neuro consulted; f/u outpt  --MRI unremarkable     Intertrigo  -Rash noted in folds of groin, will treat with topical miconazole  -Monitor     MANISH  -Baseline Cr ~1.0, pt. Presents with Cr 1.7-->1.4  -Suspect prerenal source in setting of sepsis  -resolved with IVFs  -resume ARB  - daily bmp     DM2  - Last HbA1c: 6.2 on 4/2019; now 7.4  - Diabetic diet with SSI insulin ordered  -Lantus 40 units BID listed as home dose, pt. Reports only being on 40 unitsdaily and wife states he misses that dose sometimes  -Levemir 20 units for now, uptitrate as needed     Hypertension:  - continue home atenolol, diltiazem, irbesartan   --prn hydralazine for sbp >180        Dyslipidemia:  - continue atorvastatin     Hx of CVA:  - continue asa and  atorvastatin    Thrombocytopenia  --appears to be a chronic problem, however, worsening  --plts 77; 139 on admission  --as low as 78 on 7/18/19- at the time thought to be due to cytoxan (on dose #24)  --pt has had 28 doses of cytoxan which can have bone marrow toxicity, so if worsening will consult heme  --hold heparin as has basically dropped by 50% since admission,   --on 12/7 improving (77-->94); continue to hold heparin         Diet: diabetic  VTE PPX: heparin    Goals of care: full  Dispo: SNF pending clinical improvement    Josefina Urena M.D.  Department of Hospital Medicine  [facility]  [phone] (pager)

## 2019-12-08 LAB
ANION GAP SERPL CALC-SCNC: 10 MMOL/L (ref 8–16)
BACTERIA BLD CULT: NORMAL
BACTERIA BLD CULT: NORMAL
BASOPHILS # BLD AUTO: 0.01 K/UL (ref 0–0.2)
BASOPHILS NFR BLD: 0.3 % (ref 0–1.9)
BUN SERPL-MCNC: 22 MG/DL (ref 8–23)
CALCIUM SERPL-MCNC: 9.4 MG/DL (ref 8.7–10.5)
CHLORIDE SERPL-SCNC: 111 MMOL/L (ref 95–110)
CO2 SERPL-SCNC: 21 MMOL/L (ref 23–29)
CREAT SERPL-MCNC: 1.2 MG/DL (ref 0.5–1.4)
DIFFERENTIAL METHOD: ABNORMAL
EOSINOPHIL # BLD AUTO: 0.1 K/UL (ref 0–0.5)
EOSINOPHIL NFR BLD: 2.9 % (ref 0–8)
ERYTHROCYTE [DISTWIDTH] IN BLOOD BY AUTOMATED COUNT: 14.6 % (ref 11.5–14.5)
EST. GFR  (AFRICAN AMERICAN): >60 ML/MIN/1.73 M^2
EST. GFR  (NON AFRICAN AMERICAN): >60 ML/MIN/1.73 M^2
GLUCOSE SERPL-MCNC: 217 MG/DL (ref 70–110)
HCT VFR BLD AUTO: 27.7 % (ref 40–54)
HGB BLD-MCNC: 9.2 G/DL (ref 14–18)
IMM GRANULOCYTES # BLD AUTO: 0.03 K/UL (ref 0–0.04)
IMM GRANULOCYTES NFR BLD AUTO: 0.8 % (ref 0–0.5)
LYMPHOCYTES # BLD AUTO: 0.8 K/UL (ref 1–4.8)
LYMPHOCYTES NFR BLD: 22.3 % (ref 18–48)
MCH RBC QN AUTO: 29.1 PG (ref 27–31)
MCHC RBC AUTO-ENTMCNC: 33.2 G/DL (ref 32–36)
MCV RBC AUTO: 88 FL (ref 82–98)
MONOCYTES # BLD AUTO: 0.5 K/UL (ref 0.3–1)
MONOCYTES NFR BLD: 14.1 % (ref 4–15)
NEUTROPHILS # BLD AUTO: 2.2 K/UL (ref 1.8–7.7)
NEUTROPHILS NFR BLD: 59.6 % (ref 38–73)
NRBC BLD-RTO: 0 /100 WBC
PLATELET # BLD AUTO: 103 K/UL (ref 150–350)
PMV BLD AUTO: 10.1 FL (ref 9.2–12.9)
POCT GLUCOSE: 167 MG/DL (ref 70–110)
POCT GLUCOSE: 167 MG/DL (ref 70–110)
POCT GLUCOSE: 182 MG/DL (ref 70–110)
POCT GLUCOSE: 216 MG/DL (ref 70–110)
POTASSIUM SERPL-SCNC: 3.5 MMOL/L (ref 3.5–5.1)
RBC # BLD AUTO: 3.16 M/UL (ref 4.6–6.2)
SODIUM SERPL-SCNC: 142 MMOL/L (ref 136–145)
WBC # BLD AUTO: 3.76 K/UL (ref 3.9–12.7)

## 2019-12-08 PROCEDURE — 25000003 PHARM REV CODE 250: Performed by: HOSPITALIST

## 2019-12-08 PROCEDURE — 99232 SBSQ HOSP IP/OBS MODERATE 35: CPT | Mod: ,,, | Performed by: INTERNAL MEDICINE

## 2019-12-08 PROCEDURE — 99232 PR SUBSEQUENT HOSPITAL CARE,LEVL II: ICD-10-PCS | Mod: ,,, | Performed by: INTERNAL MEDICINE

## 2019-12-08 PROCEDURE — 80048 BASIC METABOLIC PNL TOTAL CA: CPT

## 2019-12-08 PROCEDURE — 63600175 PHARM REV CODE 636 W HCPCS: Performed by: HOSPITALIST

## 2019-12-08 PROCEDURE — 25000003 PHARM REV CODE 250: Performed by: INTERNAL MEDICINE

## 2019-12-08 PROCEDURE — 36415 COLL VENOUS BLD VENIPUNCTURE: CPT

## 2019-12-08 PROCEDURE — 11000001 HC ACUTE MED/SURG PRIVATE ROOM

## 2019-12-08 PROCEDURE — 85025 COMPLETE CBC W/AUTO DIFF WBC: CPT

## 2019-12-08 RX ORDER — POTASSIUM CHLORIDE 20 MEQ/1
20 TABLET, EXTENDED RELEASE ORAL ONCE
Status: COMPLETED | OUTPATIENT
Start: 2019-12-08 | End: 2019-12-08

## 2019-12-08 RX ADMIN — INSULIN ASPART 2 UNITS: 100 INJECTION, SOLUTION INTRAVENOUS; SUBCUTANEOUS at 06:12

## 2019-12-08 RX ADMIN — INSULIN ASPART 1 UNITS: 100 INJECTION, SOLUTION INTRAVENOUS; SUBCUTANEOUS at 09:12

## 2019-12-08 RX ADMIN — INSULIN ASPART 2 UNITS: 100 INJECTION, SOLUTION INTRAVENOUS; SUBCUTANEOUS at 09:12

## 2019-12-08 RX ADMIN — LEVOFLOXACIN 750 MG: 500 TABLET, FILM COATED ORAL at 12:12

## 2019-12-08 RX ADMIN — INSULIN ASPART 4 UNITS: 100 INJECTION, SOLUTION INTRAVENOUS; SUBCUTANEOUS at 12:12

## 2019-12-08 RX ADMIN — Medication 6 MG: at 09:12

## 2019-12-08 RX ADMIN — DILTIAZEM HYDROCHLORIDE 240 MG: 240 CAPSULE, COATED, EXTENDED RELEASE ORAL at 09:12

## 2019-12-08 RX ADMIN — DONEPEZIL HYDROCHLORIDE 5 MG: 5 TABLET, FILM COATED ORAL at 09:12

## 2019-12-08 RX ADMIN — METHYLPHENIDATE HYDROCHLORIDE 10 MG: 5 TABLET ORAL at 09:12

## 2019-12-08 RX ADMIN — QUETIAPINE FUMARATE 25 MG: 25 TABLET, FILM COATED ORAL at 09:12

## 2019-12-08 RX ADMIN — SERTRALINE HYDROCHLORIDE 100 MG: 50 TABLET ORAL at 09:12

## 2019-12-08 RX ADMIN — ATORVASTATIN CALCIUM 40 MG: 20 TABLET, FILM COATED ORAL at 09:12

## 2019-12-08 RX ADMIN — ASPIRIN 81 MG: 81 TABLET, COATED ORAL at 09:12

## 2019-12-08 RX ADMIN — IRBESARTAN 300 MG: 150 TABLET, FILM COATED ORAL at 09:12

## 2019-12-08 RX ADMIN — ATENOLOL 50 MG: 50 TABLET ORAL at 09:12

## 2019-12-08 RX ADMIN — MICONAZOLE NITRATE: 20 POWDER TOPICAL at 08:12

## 2019-12-08 RX ADMIN — MICONAZOLE NITRATE: 20 POWDER TOPICAL at 12:12

## 2019-12-08 RX ADMIN — POTASSIUM CHLORIDE 20 MEQ: 1500 TABLET, EXTENDED RELEASE ORAL at 09:12

## 2019-12-08 RX ADMIN — METHYLPHENIDATE HYDROCHLORIDE 10 MG: 5 TABLET ORAL at 06:12

## 2019-12-08 NOTE — PROGRESS NOTES
Hospital Medicine  Progress Note      Patient Name: Bessy Nunez  MRN: [id number]  Date of Admission: 12/3/2019     Principal Problem: Encephalopathy     Subjective  BP well controlled today. Hyperglycemic in AM labs so levemir increased to 24u qhs. Somnolent on exam, but per wife, that is normal for him. Medically stable for SNF.      Review of Systems    Constitutional:NEGATIVE for chills, fatigue, fever.   HENT: Negative for sore throat, trouble swallowing.    Eyes: Negative for photophobia, visual disturbance.   Respiratory: Negative for cough, shortness of breath.    Cardiovascular: Negative for chest pain, palpitations, leg swelling.   Gastrointestinal: Negative for abdominal pain, constipation, diarrhea, nausea, vomiting.   Endocrine: Negative for cold intolerance, heat intolerance.   Genitourinary: Negative for dysuria, frequency.   Musculoskeletal: Negative for arthralgias, myalgias.   Skin: Negative for rash, wound, erythema   Neurological: Negative for dizziness, syncope, weakness, light-headedness.   Psychiatric/Behavioral:  NEGATIVE for confusion; improved    Medications  Scheduled Meds:   aspirin  81 mg Oral Daily    atenolol  50 mg Oral Daily    atorvastatin  40 mg Oral Daily    diltiaZEM  240 mg Oral Daily    donepezil  5 mg Oral QHS    insulin detemir U-100  24 Units Subcutaneous Daily    irbesartan  300 mg Oral QHS    levoFLOXacin  750 mg Oral Daily    methylphenidate HCl  10 mg Oral BID WM    miconazole NITRATE 2 %   Topical (Top) BID    QUEtiapine  25 mg Oral QHS    sertraline  100 mg Oral Daily     Continuous Infusions:  PRN Meds:.acetaminophen, albuterol-ipratropium, Dextrose 10% Bolus, Dextrose 10% Bolus, glucagon (human recombinant), glucose, glucose, hydrALAZINE, insulin aspart U-100, melatonin, ondansetron, prochlorperazine, sodium chloride 0.9%    Objective    Physical Examination    Temp:  [97.7 °F (36.5 °C)-98.6 °F (37 °C)]   Pulse:  [61-69]   Resp:  [14-20]   BP:  (123-162)/(63-85)   SpO2:  [94 %-98 %]     Gen: NAD, somnolent, but answering appropriately  Head: NC, AT  Eyes: PERRLA, EOMI  Throat: MMM, OP clear  CV: RRR, no M/R/G, no peripheral edema, no JVD  Resp: CTAB, no increased work of breathing on room air  GI: Soft, NT, ND, +BS  Ext: MAEW, no c/c/e  Skin: erythematous skin at groin and lower abdomen  Neuro: AAOx2, not year, but knew his age, CN grossly intact, no focal neurologic deficits  Psychiatry: Normal mood, normal affect, no SI/HI    CBC  Recent Labs   Lab 12/06/19  0446 12/07/19  0457 12/08/19  0415   WBC 3.87* 4.69 3.76*   HGB 9.4* 9.9* 9.2*   HCT 28.8* 29.9* 27.7*   PLT 77* 94* 103*     CMP  Recent Labs   Lab 12/03/19  1547 12/03/19  1937  12/06/19 0446 12/07/19  0457 12/08/19  0415    135*   < > 142 142 142   K 4.3 3.8   < > 3.5 4.2 3.5    102   < > 110 109 111*   CO2 23 24   < > 21* 25 21*   BUN 28* 32*   < > 12 15 22   CREATININE 1.6* 1.7*   < > 1.1 1.0 1.2   * 216*   < > 134* 128* 217*   CALCIUM 9.6 9.9   < > 9.0 9.3 9.4   ALKPHOS 84 81  --   --   --   --    ALT 16 15  --   --   --   --    AST 20 20  --   --   --   --    ALBUMIN 3.7 3.7  --   --   --   --    PROT 7.2 7.0  --   --   --   --    BILITOT 1.3* 1.3*  --   --   --   --    INR  --   --   --   --  1.0  --     < > = values in this interval not displayed.           Hospital Course:  Admitted o  on 12/4 for acute encephalopathy and sepsis due to UTI.  Started empirically on zosyn. Febrile overnight. Wife concerned about worsening mental status this morning and seizure like activity (rt leg shaking and tachypnea with unresponsiveness) in the morning. Neurology consulted and recommended MRI w/wo contrast and EEG. Disoriented to time, but awakens and answers some questions appropriately during eval. Denies dysuria, N/V, SOB. MRI brain w chronic ischemic change, no evidence of recent infarction or other acute intracranial pathology. Abx de-escalated to levaquin to cover for possible  lower resp infection vs UTI. BCx NGTD. Labs significant for resolution of UTI and worsening thrombocytopenia. Heparin subQ discontinued, however, has had plts as low as 78 in the past. On 12/7 platelet count improving.     Assessment and Plan:    Severe Sepsis 2/2 UTI  Complicated UTI  -Pt. With low grade fever, WBC 20, and lactate elevated to 2.8  -CXR without consolidation, no URI symptoms  -U/A with 15 WBC, LE +, but nitrite negative. Possible source  - urine culture - GNR  - blood cultures- NGTD  --flu negative  -- zosyn switched to levaquin (also with URI sx) to complete  7 days ; continue         Acute Encephalopathy  --pt w hx of cns vasculitis and baseline neurocognitive deficits, likely exacerbated by infection  --neuro consulted given seizure-like activity per wife and >1wk hx of AMS; appreciate assistance  --MRI without acute intracranial pathology  --EEG neg for seizures  --continue empiric abx  --RESOLVED  --outpt neuro f/u       CNS Vasculitis  -Pt. Followed outpatient by neurology, continue cytoxan monthly  -Continue meds donepezil, sertraline, seroquel, and methylphenidate for mentation  - neuro consulted; f/u outpt  --MRI unremarkable     Intertrigo  -Rash noted in folds of groin, will treat with topical miconazole  -Monitor     MANISH  -Baseline Cr ~1.0, pt. Presents with Cr 1.7-->1.4  -Suspect prerenal source in setting of sepsis  -resolved with IVFs  -resume ARB  - daily bmp     DM2  - Last HbA1c: 6.2 on 4/2019; now 7.4  - Diabetic diet with SSI insulin ordered  -Lantus 40 units BID listed as home dose, pt. Reports only being on 40 unitsdaily and wife states he misses that dose sometimes  -Levemir 20 units for now, uptitrate as needed     Hypertension:  - continue home atenolol, diltiazem, irbesartan   --prn hydralazine for sbp >180        Dyslipidemia:  - continue atorvastatin     Hx of CVA:  - continue asa and atorvastatin    Thrombocytopenia  --appears to be a chronic problem, however,  worsening  --plts 77; 139 on admission  --as low as 78 on 7/18/19- at the time thought to be due to cytoxan (on dose #24)  --pt has had 28 doses of cytoxan which can have bone marrow toxicity, so if worsening will consult heme  --hold heparin as has basically dropped by 50% since admission,   --on 12/7 improving (77-->94-->103); continue to hold heparin         Diet: diabetic  VTE PPX: scd  Goals of care: full  Dispo: SNF pending placement    Josefina Urena M.D.  Department of Hospital Medicine  [facility]  [phone] (pager)

## 2019-12-09 ENCOUNTER — HOSPITAL ENCOUNTER (INPATIENT)
Facility: HOSPITAL | Age: 59
LOS: 9 days | Discharge: HOME HEALTH | DRG: 872 | End: 2019-12-18
Attending: INTERNAL MEDICINE | Admitting: INTERNAL MEDICINE
Payer: MEDICARE

## 2019-12-09 VITALS
OXYGEN SATURATION: 97 % | HEIGHT: 71 IN | BODY MASS INDEX: 30.68 KG/M2 | DIASTOLIC BLOOD PRESSURE: 61 MMHG | WEIGHT: 219.13 LBS | RESPIRATION RATE: 18 BRPM | TEMPERATURE: 98 F | SYSTOLIC BLOOD PRESSURE: 129 MMHG | HEART RATE: 60 BPM

## 2019-12-09 DIAGNOSIS — N39.0 SEPSIS DUE TO URINARY TRACT INFECTION: ICD-10-CM

## 2019-12-09 DIAGNOSIS — A41.9 SEPSIS DUE TO URINARY TRACT INFECTION: ICD-10-CM

## 2019-12-09 DIAGNOSIS — Z79.4 TYPE 2 DIABETES MELLITUS WITH HYPERGLYCEMIA, WITH LONG-TERM CURRENT USE OF INSULIN: ICD-10-CM

## 2019-12-09 DIAGNOSIS — Z79.4 CONTROLLED TYPE 2 DIABETES MELLITUS WITH DIABETIC NEPHROPATHY, WITH LONG-TERM CURRENT USE OF INSULIN: ICD-10-CM

## 2019-12-09 DIAGNOSIS — E11.21 CONTROLLED TYPE 2 DIABETES MELLITUS WITH DIABETIC NEPHROPATHY, WITH LONG-TERM CURRENT USE OF INSULIN: ICD-10-CM

## 2019-12-09 DIAGNOSIS — Z74.09 IMPAIRED FUNCTIONAL MOBILITY, BALANCE, GAIT, AND ENDURANCE: Primary | ICD-10-CM

## 2019-12-09 DIAGNOSIS — E11.65 TYPE 2 DIABETES MELLITUS WITH HYPERGLYCEMIA, WITH LONG-TERM CURRENT USE OF INSULIN: ICD-10-CM

## 2019-12-09 LAB
ANION GAP SERPL CALC-SCNC: 8 MMOL/L (ref 8–16)
BASOPHILS # BLD AUTO: 0.03 K/UL (ref 0–0.2)
BASOPHILS NFR BLD: 0.6 % (ref 0–1.9)
BUN SERPL-MCNC: 19 MG/DL (ref 8–23)
CALCIUM SERPL-MCNC: 8.8 MG/DL (ref 8.7–10.5)
CHLORIDE SERPL-SCNC: 112 MMOL/L (ref 95–110)
CO2 SERPL-SCNC: 22 MMOL/L (ref 23–29)
CREAT SERPL-MCNC: 1 MG/DL (ref 0.5–1.4)
DIFFERENTIAL METHOD: ABNORMAL
EOSINOPHIL # BLD AUTO: 0.2 K/UL (ref 0–0.5)
EOSINOPHIL NFR BLD: 3.2 % (ref 0–8)
ERYTHROCYTE [DISTWIDTH] IN BLOOD BY AUTOMATED COUNT: 14.4 % (ref 11.5–14.5)
EST. GFR  (AFRICAN AMERICAN): >60 ML/MIN/1.73 M^2
EST. GFR  (NON AFRICAN AMERICAN): >60 ML/MIN/1.73 M^2
GLUCOSE SERPL-MCNC: 129 MG/DL (ref 70–110)
HCT VFR BLD AUTO: 27.8 % (ref 40–54)
HGB BLD-MCNC: 9.1 G/DL (ref 14–18)
IMM GRANULOCYTES # BLD AUTO: 0.07 K/UL (ref 0–0.04)
IMM GRANULOCYTES NFR BLD AUTO: 1.4 % (ref 0–0.5)
LYMPHOCYTES # BLD AUTO: 1.1 K/UL (ref 1–4.8)
LYMPHOCYTES NFR BLD: 21.8 % (ref 18–48)
MCH RBC QN AUTO: 28.8 PG (ref 27–31)
MCHC RBC AUTO-ENTMCNC: 32.7 G/DL (ref 32–36)
MCV RBC AUTO: 88 FL (ref 82–98)
MONOCYTES # BLD AUTO: 0.5 K/UL (ref 0.3–1)
MONOCYTES NFR BLD: 10.9 % (ref 4–15)
NEUTROPHILS # BLD AUTO: 3.1 K/UL (ref 1.8–7.7)
NEUTROPHILS NFR BLD: 62.1 % (ref 38–73)
NRBC BLD-RTO: 0 /100 WBC
PLATELET # BLD AUTO: 134 K/UL (ref 150–350)
PMV BLD AUTO: 9.9 FL (ref 9.2–12.9)
POCT GLUCOSE: 131 MG/DL (ref 70–110)
POCT GLUCOSE: 131 MG/DL (ref 70–110)
POCT GLUCOSE: 202 MG/DL (ref 70–110)
POCT GLUCOSE: 203 MG/DL (ref 70–110)
POTASSIUM SERPL-SCNC: 3.6 MMOL/L (ref 3.5–5.1)
RBC # BLD AUTO: 3.16 M/UL (ref 4.6–6.2)
SODIUM SERPL-SCNC: 142 MMOL/L (ref 136–145)
WBC # BLD AUTO: 4.95 K/UL (ref 3.9–12.7)

## 2019-12-09 PROCEDURE — 25000003 PHARM REV CODE 250: Performed by: INTERNAL MEDICINE

## 2019-12-09 PROCEDURE — 11000004 HC SNF PRIVATE

## 2019-12-09 PROCEDURE — 80048 BASIC METABOLIC PNL TOTAL CA: CPT

## 2019-12-09 PROCEDURE — 99239 HOSP IP/OBS DSCHRG MGMT >30: CPT | Mod: ,,, | Performed by: INTERNAL MEDICINE

## 2019-12-09 PROCEDURE — 25000003 PHARM REV CODE 250: Performed by: HOSPITALIST

## 2019-12-09 PROCEDURE — 36415 COLL VENOUS BLD VENIPUNCTURE: CPT

## 2019-12-09 PROCEDURE — 97535 SELF CARE MNGMENT TRAINING: CPT

## 2019-12-09 PROCEDURE — 85025 COMPLETE CBC W/AUTO DIFF WBC: CPT

## 2019-12-09 PROCEDURE — 99239 PR HOSPITAL DISCHARGE DAY,>30 MIN: ICD-10-PCS | Mod: ,,, | Performed by: INTERNAL MEDICINE

## 2019-12-09 RX ORDER — INSULIN ASPART 100 [IU]/ML
1-10 INJECTION, SOLUTION INTRAVENOUS; SUBCUTANEOUS
Status: CANCELLED | OUTPATIENT
Start: 2019-12-09

## 2019-12-09 RX ORDER — MICONAZOLE NITRATE 2 %
POWDER (GRAM) TOPICAL 2 TIMES DAILY
Status: DISCONTINUED | OUTPATIENT
Start: 2019-12-09 | End: 2019-12-18 | Stop reason: HOSPADM

## 2019-12-09 RX ORDER — TALC
6 POWDER (GRAM) TOPICAL NIGHTLY PRN
Status: CANCELLED | OUTPATIENT
Start: 2019-12-09

## 2019-12-09 RX ORDER — METHYLPHENIDATE HYDROCHLORIDE 5 MG/1
10 TABLET ORAL 2 TIMES DAILY WITH MEALS
Status: CANCELLED | OUTPATIENT
Start: 2019-12-09

## 2019-12-09 RX ORDER — INSULIN ASPART 100 [IU]/ML
1-10 INJECTION, SOLUTION INTRAVENOUS; SUBCUTANEOUS
Status: DISCONTINUED | OUTPATIENT
Start: 2019-12-09 | End: 2019-12-18 | Stop reason: HOSPADM

## 2019-12-09 RX ORDER — DEXTROSE 50 % IN WATER (D50W) INTRAVENOUS SYRINGE
12.5
Status: DISCONTINUED | OUTPATIENT
Start: 2019-12-09 | End: 2019-12-18 | Stop reason: HOSPADM

## 2019-12-09 RX ORDER — ASPIRIN 81 MG/1
81 TABLET ORAL DAILY
Status: CANCELLED | OUTPATIENT
Start: 2019-12-10

## 2019-12-09 RX ORDER — ATORVASTATIN CALCIUM 20 MG/1
40 TABLET, FILM COATED ORAL DAILY
Status: DISCONTINUED | OUTPATIENT
Start: 2019-12-10 | End: 2019-12-18 | Stop reason: HOSPADM

## 2019-12-09 RX ORDER — DEXTROSE 50 % IN WATER (D50W) INTRAVENOUS SYRINGE
25
Status: DISCONTINUED | OUTPATIENT
Start: 2019-12-09 | End: 2019-12-18 | Stop reason: HOSPADM

## 2019-12-09 RX ORDER — SERTRALINE HYDROCHLORIDE 50 MG/1
100 TABLET, FILM COATED ORAL DAILY
Status: CANCELLED | OUTPATIENT
Start: 2019-12-10

## 2019-12-09 RX ORDER — IPRATROPIUM BROMIDE AND ALBUTEROL SULFATE 2.5; .5 MG/3ML; MG/3ML
3 SOLUTION RESPIRATORY (INHALATION) EVERY 6 HOURS PRN
Status: DISCONTINUED | OUTPATIENT
Start: 2019-12-09 | End: 2019-12-18 | Stop reason: HOSPADM

## 2019-12-09 RX ORDER — IBUPROFEN 200 MG
16 TABLET ORAL
Status: CANCELLED | OUTPATIENT
Start: 2019-12-09

## 2019-12-09 RX ORDER — IRBESARTAN 75 MG/1
300 TABLET ORAL NIGHTLY
Status: DISCONTINUED | OUTPATIENT
Start: 2019-12-09 | End: 2019-12-18 | Stop reason: HOSPADM

## 2019-12-09 RX ORDER — METHYLPHENIDATE HYDROCHLORIDE 10 MG/1
10 TABLET ORAL 2 TIMES DAILY WITH MEALS
Status: DISCONTINUED | OUTPATIENT
Start: 2019-12-10 | End: 2019-12-18 | Stop reason: HOSPADM

## 2019-12-09 RX ORDER — AMOXICILLIN 250 MG
1 CAPSULE ORAL 2 TIMES DAILY
Status: DISCONTINUED | OUTPATIENT
Start: 2019-12-09 | End: 2019-12-18 | Stop reason: HOSPADM

## 2019-12-09 RX ORDER — ATENOLOL 50 MG/1
50 TABLET ORAL DAILY
Status: CANCELLED | OUTPATIENT
Start: 2019-12-10

## 2019-12-09 RX ORDER — AMOXICILLIN 250 MG
1 CAPSULE ORAL 2 TIMES DAILY
Status: CANCELLED | OUTPATIENT
Start: 2019-12-09

## 2019-12-09 RX ORDER — TALC
6 POWDER (GRAM) TOPICAL NIGHTLY PRN
Status: DISCONTINUED | OUTPATIENT
Start: 2019-12-09 | End: 2019-12-18 | Stop reason: HOSPADM

## 2019-12-09 RX ORDER — CALCIUM CARBONATE 200(500)MG
500 TABLET,CHEWABLE ORAL 2 TIMES DAILY PRN
Status: CANCELLED | OUTPATIENT
Start: 2019-12-09

## 2019-12-09 RX ORDER — QUETIAPINE FUMARATE 25 MG/1
25 TABLET, FILM COATED ORAL NIGHTLY
Status: DISCONTINUED | OUTPATIENT
Start: 2019-12-09 | End: 2019-12-18 | Stop reason: HOSPADM

## 2019-12-09 RX ORDER — IBUPROFEN 200 MG
24 TABLET ORAL
Status: DISCONTINUED | OUTPATIENT
Start: 2019-12-09 | End: 2019-12-18 | Stop reason: HOSPADM

## 2019-12-09 RX ORDER — ACETAMINOPHEN 325 MG/1
650 TABLET ORAL EVERY 4 HOURS PRN
Status: DISCONTINUED | OUTPATIENT
Start: 2019-12-09 | End: 2019-12-18 | Stop reason: HOSPADM

## 2019-12-09 RX ORDER — QUETIAPINE FUMARATE 25 MG/1
25 TABLET, FILM COATED ORAL NIGHTLY
Status: CANCELLED | OUTPATIENT
Start: 2019-12-09

## 2019-12-09 RX ORDER — ENOXAPARIN SODIUM 100 MG/ML
40 INJECTION SUBCUTANEOUS EVERY 24 HOURS
Status: DISCONTINUED | OUTPATIENT
Start: 2019-12-09 | End: 2019-12-09 | Stop reason: HOSPADM

## 2019-12-09 RX ORDER — GLUCAGON 1 MG
1 KIT INJECTION
Status: CANCELLED | OUTPATIENT
Start: 2019-12-09

## 2019-12-09 RX ORDER — IPRATROPIUM BROMIDE AND ALBUTEROL SULFATE 2.5; .5 MG/3ML; MG/3ML
3 SOLUTION RESPIRATORY (INHALATION) EVERY 6 HOURS PRN
Status: CANCELLED | OUTPATIENT
Start: 2019-12-09

## 2019-12-09 RX ORDER — IBUPROFEN 200 MG
24 TABLET ORAL
Status: CANCELLED | OUTPATIENT
Start: 2019-12-09

## 2019-12-09 RX ORDER — ACETAMINOPHEN 325 MG/1
650 TABLET ORAL EVERY 4 HOURS PRN
Status: CANCELLED | OUTPATIENT
Start: 2019-12-09

## 2019-12-09 RX ORDER — MICONAZOLE NITRATE 2 %
POWDER (GRAM) TOPICAL 2 TIMES DAILY
Status: CANCELLED | OUTPATIENT
Start: 2019-12-09

## 2019-12-09 RX ORDER — DONEPEZIL HYDROCHLORIDE 5 MG/1
5 TABLET, FILM COATED ORAL NIGHTLY
Status: CANCELLED | OUTPATIENT
Start: 2019-12-09

## 2019-12-09 RX ORDER — ASPIRIN 81 MG/1
81 TABLET ORAL DAILY
Status: DISCONTINUED | OUTPATIENT
Start: 2019-12-10 | End: 2019-12-18 | Stop reason: HOSPADM

## 2019-12-09 RX ORDER — IRBESARTAN 150 MG/1
300 TABLET ORAL NIGHTLY
Status: CANCELLED | OUTPATIENT
Start: 2019-12-09

## 2019-12-09 RX ORDER — ACETAMINOPHEN 325 MG/1
650 TABLET ORAL EVERY 6 HOURS PRN
Status: CANCELLED | OUTPATIENT
Start: 2019-12-09

## 2019-12-09 RX ORDER — IBUPROFEN 200 MG
16 TABLET ORAL
Status: DISCONTINUED | OUTPATIENT
Start: 2019-12-09 | End: 2019-12-18 | Stop reason: HOSPADM

## 2019-12-09 RX ORDER — SERTRALINE HYDROCHLORIDE 50 MG/1
100 TABLET, FILM COATED ORAL DAILY
Status: DISCONTINUED | OUTPATIENT
Start: 2019-12-10 | End: 2019-12-10

## 2019-12-09 RX ORDER — GLUCAGON 1 MG
1 KIT INJECTION
Status: DISCONTINUED | OUTPATIENT
Start: 2019-12-09 | End: 2019-12-18 | Stop reason: HOSPADM

## 2019-12-09 RX ORDER — ATORVASTATIN CALCIUM 20 MG/1
40 TABLET, FILM COATED ORAL DAILY
Status: CANCELLED | OUTPATIENT
Start: 2019-12-10

## 2019-12-09 RX ORDER — CALCIUM CARBONATE 200(500)MG
500 TABLET,CHEWABLE ORAL 2 TIMES DAILY PRN
Status: DISCONTINUED | OUTPATIENT
Start: 2019-12-09 | End: 2019-12-18 | Stop reason: HOSPADM

## 2019-12-09 RX ORDER — TALC
6 POWDER (GRAM) TOPICAL NIGHTLY PRN
Status: DISCONTINUED | OUTPATIENT
Start: 2019-12-09 | End: 2019-12-09

## 2019-12-09 RX ORDER — ACETAMINOPHEN 325 MG/1
650 TABLET ORAL EVERY 6 HOURS PRN
Status: DISCONTINUED | OUTPATIENT
Start: 2019-12-09 | End: 2019-12-09

## 2019-12-09 RX ORDER — DILTIAZEM HYDROCHLORIDE 240 MG/1
240 CAPSULE, COATED, EXTENDED RELEASE ORAL DAILY
Status: CANCELLED | OUTPATIENT
Start: 2019-12-10

## 2019-12-09 RX ORDER — DONEPEZIL HYDROCHLORIDE 5 MG/1
5 TABLET, FILM COATED ORAL NIGHTLY
Status: DISCONTINUED | OUTPATIENT
Start: 2019-12-09 | End: 2019-12-18 | Stop reason: HOSPADM

## 2019-12-09 RX ORDER — DILTIAZEM HYDROCHLORIDE 120 MG/1
240 CAPSULE, COATED, EXTENDED RELEASE ORAL DAILY
Status: DISCONTINUED | OUTPATIENT
Start: 2019-12-10 | End: 2019-12-18 | Stop reason: HOSPADM

## 2019-12-09 RX ADMIN — IRBESARTAN 300 MG: 75 TABLET ORAL at 09:12

## 2019-12-09 RX ADMIN — DONEPEZIL HYDROCHLORIDE 5 MG: 5 TABLET, FILM COATED ORAL at 08:12

## 2019-12-09 RX ADMIN — ASPIRIN 81 MG: 81 TABLET, COATED ORAL at 09:12

## 2019-12-09 RX ADMIN — SENNOSIDES AND DOCUSATE SODIUM 1 TABLET: 8.6; 5 TABLET ORAL at 08:12

## 2019-12-09 RX ADMIN — ATORVASTATIN CALCIUM 40 MG: 20 TABLET, FILM COATED ORAL at 09:12

## 2019-12-09 RX ADMIN — MICONAZOLE NITRATE: 20 POWDER TOPICAL at 09:12

## 2019-12-09 RX ADMIN — INSULIN ASPART 4 UNITS: 100 INJECTION, SOLUTION INTRAVENOUS; SUBCUTANEOUS at 12:12

## 2019-12-09 RX ADMIN — DILTIAZEM HYDROCHLORIDE 240 MG: 240 CAPSULE, COATED, EXTENDED RELEASE ORAL at 09:12

## 2019-12-09 RX ADMIN — METHYLPHENIDATE HYDROCHLORIDE 10 MG: 5 TABLET ORAL at 09:12

## 2019-12-09 RX ADMIN — ATENOLOL 50 MG: 50 TABLET ORAL at 09:12

## 2019-12-09 RX ADMIN — MICONAZOLE NITRATE: 20 POWDER TOPICAL at 08:12

## 2019-12-09 RX ADMIN — LEVOFLOXACIN 750 MG: 500 TABLET, FILM COATED ORAL at 09:12

## 2019-12-09 RX ADMIN — QUETIAPINE FUMARATE 25 MG: 25 TABLET ORAL at 08:12

## 2019-12-09 RX ADMIN — SERTRALINE HYDROCHLORIDE 100 MG: 50 TABLET ORAL at 09:12

## 2019-12-09 NOTE — PLAN OF CARE
Problem: Occupational Therapy Goal  Goal: Occupational Therapy Goal  Description  Goals to be met by: 12/12/19     Patient will increase functional independence with ADLs by performing:    UE Dressing with Stand-by Assistance.  LE Dressing with Moderate Assistance. Met  Grooming while standing with Moderate Assistance.  Toileting from toilet with Minimal Assistance for hygiene and clothing management.   Rolling to Bilateral with Moderate Assistance. Met   Supine to sit with Moderate Assistance. Met  GOAL revised: Supine to sit with modified independence.   Step transfer with Contact Guard Assistance Met  GOAL revised: Step transfer with supervision.   Toilet transfer to toilet with Contact Guard Assistance. Met        Outcome: Ongoing, Progressing   Patient's goals are appropriate.   COLTON Sepulveda  12/9/2019

## 2019-12-09 NOTE — PT/OT/SLP PROGRESS
Physical Therapy      Patient Name:  Bessy Nunez   MRN:  [id number]    Patient not seen today. Per chart review pt remains appropriate for therapy service. Will follow-up 12/10.     Reynaldo Valladares, PTA

## 2019-12-09 NOTE — PROCEDURES
DATE OF PROCEDURE:  12/05/2019    DURATION:  30 minutes and 5 seconds.    ELECTROENCEPHALOGRAM REPORT    METHODOLOGY:  Electroencephalographic (EEG) recording is recorded with   electrodes placed according to the International 10-20 placement system.  Thirty   two (32) channels of digital signal (sampling rate of 512/sec), including T1   and T2, were simultaneously recorded from the scalp and may include EKG, EMG,   and/or eye monitors.  Recording band pass was 0.1 to 512 Hz.  Digital video   recording of the patient is simultaneously recorded with the EEG.  The patient   is instructed to report clinical symptoms which may occur during the recording   session.  EEG and video recording are stored and archived in digital format.    Activation procedures, which include photic stimulation, hyperventilation and   instructing patients to perform simple tasks, are done in selected patients.  The EEG is displayed on a monitor screen and can be reviewed using different   montages.  Computer assisted-analysis is employed to detect spike and   electrographic seizure activity.  The entire record is submitted for computer   analysis.  The entire recording is visually reviewed, and the times identified   by computer analysis as being spikes or seizures are reviewed again.  Compressed spectral analysis (CSA) is also performed on the activity recorded   from each individual channel.  This is displayed as a power display of   frequencies from 0 to 30 Hz over time.  The CSA is reviewed looking for   asymmetries in power between homologous areas of the scalp, then compared with   the original EEG recording.  [employer] software was also utilized in the review of this study.  This software   suite analyzes the EEG recording in multiple domains.  Coherence and rhythmicity   are computed to identify EEG sections which may contain organized seizures.    Each channel undergoes analysis to detect the presence of spike and sharp waves   which  have special and morphological characteristics of epileptic activity.  The   routine EEG recording is converted from special into frequency domain.  This is   then displayed comparing homologous areas to identify areas of significant   asymmetry.  Algorithm to identify non-cortically generated artifact is used to   separate artifact from the EEG.    EEG FINDINGS:  This is a well recorded medium amplitude study, which contains   symmetric mixed frequency activity including a 9 to 10 Hz posterior dominant   rhythm seen in the occipital channels when the patient is stimulated.  Much of   the study takes place during drowsiness or light sleep.  Sleep spindles are   occasionally seen.  No epileptiform discharges are seen.  No focal or   asymmetrical findings are seen.    INTERPRETATION:  Normal awake, drowsy and asleep EEG.      NBB/IN  dd: 12/06/2019 17:57:33 (CST)  td: 12/06/2019 18:09:10 (CST)  Doc ID   #[id number]  Job ID #[id number]    CC:

## 2019-12-09 NOTE — PLAN OF CARE
JULIAN scheduled d/c transportation to [facility] through [facility]. Patient is scheduled to be picked up at 6:00 pm. JULIAN provided patient's nurse with report number [phone]. JULIAN is in communication with patient's CM, and patient's Care Team - [facility].      12/09/19 1641   Post-Acute Status   Post-Acute Authorization Placement   Post-Acute Placement Status Set-up Complete     Adriane Rizo LMSW   - [facility]  Ext. [phone]

## 2019-12-09 NOTE — PLAN OF CARE
Discharge planning: Received call from Faiza Castorena requesting updated PT/OT and progress notes. Sent via [facility].   Message sent to [facility] to see if beds available today.

## 2019-12-09 NOTE — PT/OT/SLP PROGRESS
Occupational Therapy   Treatment    Name: Bessy Nunez  MRN: [id number]  Admitting Diagnosis:  Encephalopathy       Recommendations:     Discharge Recommendations: nursing facility, skilled  Discharge Equipment Recommendations:  walker, rolling  Barriers to discharge:  None    Assessment:     Bessy Nunez is a 59 y.o. male with a medical diagnosis of Encephalopathy. Performance deficits affecting function are weakness, impaired endurance, impaired self care skills, impaired functional mobilty, gait instability, impaired balance, decreased coordination, impaired coordination, impaired cognition, decreased upper extremity function, decreased lower extremity function, decreased safety awareness, impaired fine motor.     Rehab Prognosis:  Good; patient would benefit from acute skilled OT services to address these deficits and reach maximum level of function.       Plan:     Patient to be seen 3 x/week to address the above listed problems via self-care/home management, therapeutic activities, therapeutic exercises, cognitive retraining, neuromuscular re-education  · Plan of Care Expires: 01/04/20  · Plan of Care Reviewed with: patient, spouse    Subjective     Pain/Comfort:  · Pain Rating 1: 0/10  · Pain Rating Post-Intervention 1: 0/10    Objective:     Communicated with: patient prior to session.  Patient found HOB elevated with telemetry, peripheral IV upon OT entry to room.    General Precautions: Standard, fall   Orthopedic Precautions:N/A   Braces:  N/A    Occupational Performance:     Bed Mobility:    · Patient completed Supine to Sit with contact guard assistance     Functional Mobility/Transfers:  · Patient completed Sit <> Stand Transfer with stand by assistance  with  rolling walker   · Patient completed Toilet Transfer bed>BSC; BSC>bedside chair Stand Pivot technique with contact guard assistance with  rolling walker    Activities of Daily Living:  · Upper Body Dressing: minimum assistance Donning back  gown  · Lower Body Dressing: stand by assistance Donning socks  · Toileting: total assistance        AMPA 6 Click ADL: 16    Treatment & Education:  Role of OT and POC  ADL retraining  Safety  Functional mobility training    Patient left up in chair with call button in reach and all need met (spouse present)Education:      GOALS:   Multidisciplinary Problems     Occupational Therapy Goals        Problem: Occupational Therapy Goal    Goal Priority Disciplines Outcome Interventions   Occupational Therapy Goal     OT, PT/OT Ongoing, Progressing    Description:  Goals to be met by: 12/12/19     Patient will increase functional independence with ADLs by performing:    UE Dressing with Stand-by Assistance.  LE Dressing with Moderate Assistance. Met  Grooming while standing with Moderate Assistance.  Toileting from toilet with Minimal Assistance for hygiene and clothing management.   Rolling to Bilateral with Moderate Assistance. Met   Supine to sit with Moderate Assistance. Met  GOAL revised: Supine to sit with modified independence.   Step transfer with Contact Guard Assistance Met  GOAL revised: Step transfer with supervision.   Toilet transfer to toilet with Contact Guard Assistance. Met                         Time Tracking:     OT Date of Treatment: 12/09/19  OT Start Time: 0939  OT Stop Time: 1002  OT Total Time (min): 23 min    Billable Minutes:Self Care/Home Management 23    COLTON Sepulveda  12/9/2019

## 2019-12-09 NOTE — PLAN OF CARE
Problem: Fall Injury Risk  Goal: Absence of Fall and Fall-Related Injury  Outcome: Met     Problem: Fall Injury Risk  Goal: Absence of Fall and Fall-Related Injury  Outcome: Met     Problem: Adult Inpatient Plan of Care  Goal: Plan of Care Review  Outcome: Met  Goal: Patient-Specific Goal (Individualization)  Outcome: Met  Goal: Absence of Hospital-Acquired Illness or Injury  Outcome: Met  Goal: Optimal Comfort and Wellbeing  Outcome: Met  Goal: Readiness for Transition of Care  Outcome: Met  Goal: Rounds/Family Conference  Outcome: Met     Problem: Adult Inpatient Plan of Care  Goal: Patient-Specific Goal (Individualization)  Outcome: Met     Problem: Adult Inpatient Plan of Care  Goal: Absence of Hospital-Acquired Illness or Injury  Outcome: Met     Problem: Adult Inpatient Plan of Care  Goal: Optimal Comfort and Wellbeing  Outcome: Met     Problem: Adult Inpatient Plan of Care  Goal: Readiness for Transition of Care  Outcome: Met     Problem: Adult Inpatient Plan of Care  Goal: Rounds/Family Conference  Outcome: Met     Problem: Skin Injury Risk Increased  Goal: Skin Health and Integrity  Outcome: Met     Problem: Diabetes Comorbidity  Goal: Blood Glucose Level Within Desired Range  Outcome: Met     Problem: Diabetes Comorbidity  Goal: Blood Glucose Level Within Desired Range  Outcome: Met     Problem: Skin Injury Risk Increased  Goal: Skin Health and Integrity  Outcome: Met  Patient is discharged to [facility]. Report called to DIANA Momin. Transportation is arranged per  here at [facility]. Informed patient and spouse, no further orders or concerns at this time.

## 2019-12-10 LAB
POCT GLUCOSE: 122 MG/DL (ref 70–110)
POCT GLUCOSE: 135 MG/DL (ref 70–110)
POCT GLUCOSE: 164 MG/DL (ref 70–110)
POCT GLUCOSE: 165 MG/DL (ref 70–110)

## 2019-12-10 PROCEDURE — 99306 PR NURSING FACILITY CARE, INIT, HIGH SEVERITY: ICD-10-PCS | Mod: AI,,, | Performed by: INTERNAL MEDICINE

## 2019-12-10 PROCEDURE — 97535 SELF CARE MNGMENT TRAINING: CPT

## 2019-12-10 PROCEDURE — 11000004 HC SNF PRIVATE

## 2019-12-10 PROCEDURE — 25000003 PHARM REV CODE 250: Performed by: INTERNAL MEDICINE

## 2019-12-10 PROCEDURE — 97110 THERAPEUTIC EXERCISES: CPT

## 2019-12-10 PROCEDURE — 97116 GAIT TRAINING THERAPY: CPT

## 2019-12-10 PROCEDURE — 99306 1ST NF CARE HIGH MDM 50: CPT | Mod: AI,,, | Performed by: INTERNAL MEDICINE

## 2019-12-10 PROCEDURE — C9399 UNCLASSIFIED DRUGS OR BIOLOG: HCPCS | Performed by: INTERNAL MEDICINE

## 2019-12-10 PROCEDURE — 92523 SPEECH SOUND LANG COMPREHEN: CPT

## 2019-12-10 PROCEDURE — 92610 EVALUATE SWALLOWING FUNCTION: CPT

## 2019-12-10 PROCEDURE — 63600175 PHARM REV CODE 636 W HCPCS: Performed by: INTERNAL MEDICINE

## 2019-12-10 PROCEDURE — 97165 OT EVAL LOW COMPLEX 30 MIN: CPT

## 2019-12-10 PROCEDURE — 97162 PT EVAL MOD COMPLEX 30 MIN: CPT

## 2019-12-10 RX ORDER — FERROUS SULFATE 325(65) MG
325 TABLET, DELAYED RELEASE (ENTERIC COATED) ORAL
Status: DISCONTINUED | OUTPATIENT
Start: 2019-12-10 | End: 2019-12-18 | Stop reason: HOSPADM

## 2019-12-10 RX ORDER — ASCORBIC ACID 250 MG
250 TABLET ORAL
Status: DISCONTINUED | OUTPATIENT
Start: 2019-12-10 | End: 2019-12-18 | Stop reason: HOSPADM

## 2019-12-10 RX ORDER — CHOLECALCIFEROL (VITAMIN D3) 25 MCG
5000 TABLET ORAL DAILY
Status: DISCONTINUED | OUTPATIENT
Start: 2019-12-11 | End: 2019-12-18 | Stop reason: HOSPADM

## 2019-12-10 RX ORDER — METFORMIN HYDROCHLORIDE 500 MG/1
1000 TABLET ORAL 2 TIMES DAILY WITH MEALS
Status: DISCONTINUED | OUTPATIENT
Start: 2019-12-10 | End: 2019-12-18 | Stop reason: HOSPADM

## 2019-12-10 RX ORDER — SERTRALINE HYDROCHLORIDE 50 MG/1
150 TABLET, FILM COATED ORAL DAILY
Status: DISCONTINUED | OUTPATIENT
Start: 2019-12-11 | End: 2019-12-18 | Stop reason: HOSPADM

## 2019-12-10 RX ADMIN — MICONAZOLE NITRATE: 20 POWDER TOPICAL at 10:12

## 2019-12-10 RX ADMIN — FERROUS SULFATE TAB EC 325 MG (65 MG FE EQUIVALENT) 325 MG: 325 (65 FE) TABLET DELAYED RESPONSE at 10:12

## 2019-12-10 RX ADMIN — DONEPEZIL HYDROCHLORIDE 5 MG: 5 TABLET, FILM COATED ORAL at 10:12

## 2019-12-10 RX ADMIN — SERTRALINE HYDROCHLORIDE 100 MG: 50 TABLET ORAL at 08:12

## 2019-12-10 RX ADMIN — METHYLPHENIDATE HYDROCHLORIDE 10 MG: 10 TABLET ORAL at 05:12

## 2019-12-10 RX ADMIN — METFORMIN HYDROCHLORIDE 1000 MG: 500 TABLET, FILM COATED ORAL at 10:12

## 2019-12-10 RX ADMIN — SENNOSIDES AND DOCUSATE SODIUM 1 TABLET: 8.6; 5 TABLET ORAL at 08:12

## 2019-12-10 RX ADMIN — INSULIN ASPART 1 UNITS: 100 INJECTION, SOLUTION INTRAVENOUS; SUBCUTANEOUS at 10:12

## 2019-12-10 RX ADMIN — METHYLPHENIDATE HYDROCHLORIDE 10 MG: 10 TABLET ORAL at 08:12

## 2019-12-10 RX ADMIN — ASPIRIN 81 MG: 81 TABLET, DELAYED RELEASE ORAL at 08:12

## 2019-12-10 RX ADMIN — DILTIAZEM HYDROCHLORIDE 240 MG: 120 CAPSULE, COATED, EXTENDED RELEASE ORAL at 08:12

## 2019-12-10 RX ADMIN — ATENOLOL 50 MG: 25 TABLET ORAL at 08:12

## 2019-12-10 RX ADMIN — INSULIN ASPART 2 UNITS: 100 INJECTION, SOLUTION INTRAVENOUS; SUBCUTANEOUS at 12:12

## 2019-12-10 RX ADMIN — ATORVASTATIN CALCIUM 40 MG: 20 TABLET, FILM COATED ORAL at 08:12

## 2019-12-10 RX ADMIN — QUETIAPINE FUMARATE 25 MG: 25 TABLET ORAL at 10:12

## 2019-12-10 RX ADMIN — INSULIN DETEMIR 24 UNITS: 100 INJECTION, SOLUTION SUBCUTANEOUS at 08:12

## 2019-12-10 RX ADMIN — MICONAZOLE NITRATE: 20 POWDER TOPICAL at 08:12

## 2019-12-10 RX ADMIN — Medication 250 MG: at 10:12

## 2019-12-10 RX ADMIN — IRBESARTAN 300 MG: 75 TABLET ORAL at 10:12

## 2019-12-10 NOTE — HOSPITAL COURSE
Admitted o  on 12/4 for acute encephalopathy and sepsis due to UTI.  Started empirically on zosyn. Febrile overnight. Wife concerned about worsening mental status this morning and seizure like activity (rt leg shaking and tachypnea with unresponsiveness) in the morning. Neurology consulted and recommended MRI w/wo contrast and EEG. Disoriented to time, but awakens and answers some questions appropriately during eval. Denies dysuria, N/V, SOB. MRI brain w chronic ischemic change, no evidence of recent infarction or other acute intracranial pathology. Abx de-escalated to levaquin to cover for possible lower resp infection vs UTI. BCx NGTD. Labs significant for resolution of UTI and worsening thrombocytopenia. Heparin subQ discontinued, however, has had plts as low as 78 in the past. On 12/7 platelet count improving. Completed 7 day course abx. Menatl status remained at baseline. Medically stable for discharge to SNF.

## 2019-12-10 NOTE — HPI
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 While in clinic, the patient's wife reported that the patient had been acting unusually over the last week or so. She reports that he has been refusing baths, hiding his medication, and urinating on himself frequently. The patient has some cognitive dysfunction from his CNS vasculitis at baseline, but the patient usually is competent and compliant with his grooming and medications. The patient and his wife deny and fevers, cough, nausea, vomiting, or diarrhea. The patient's wife does report that he has not been having as much PO intake as usual. The patient states that he is fine and nothing is wrong. He is alert to person and situation but not year.

## 2019-12-10 NOTE — DISCHARGE SUMMARY
[facility] Hospital Medicine  Discharge Summary      Patient Name: Bessy Nunez  MRN: [id number]  Admission Date: 12/3/2019  Hospital Length of Stay: 6 days  Discharge Date and Time:  12/09/2019 8:44 PM  Attending Physician: No att. 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 While in clinic, the patient's wife reported that the patient had been acting unusually over the last week or so. She reports that he has been refusing baths, hiding his medication, and urinating on himself frequently. The patient has some cognitive dysfunction from his CNS vasculitis at baseline, but the patient usually is competent and compliant with his grooming and medications. The patient and his wife deny and fevers, cough, nausea, vomiting, or diarrhea. The patient's wife does report that he has not been having as much PO intake as usual. The patient states that he is fine and nothing is wrong. He is alert to person and situation but not year.    * No surgery found *      Hospital Course:   Admitted o  on 12/4 for acute encephalopathy and sepsis due to UTI.  Started empirically on zosyn. Febrile overnight. Wife concerned about worsening mental status this morning and seizure like activity (rt leg shaking and tachypnea with unresponsiveness) in the morning. Neurology consulted and recommended MRI w/wo contrast and EEG. Disoriented to time, but awakens and answers some questions appropriately during eval. Denies dysuria, N/V, SOB. MRI brain w chronic ischemic change, no evidence of recent infarction or other acute intracranial pathology. Abx de-escalated to levaquin to cover for possible lower resp infection vs UTI. BCx NGTD. Labs significant  for resolution of UTI and worsening thrombocytopenia. Heparin subQ discontinued, however, has had plts as low as 78 in the past. On 12/7 platelet count improving. Completed 7 day course abx. Menatl status remained at baseline. Medically stable for discharge to SNF.        Consults:   Consults (From admission, onward)        Status Ordering Provider     Inpatient consult to Neurology  Once     Provider:  (Not yet assigned)    Completed EFFIE PRITCHETT          No new Assessment & Plan notes have been filed under this hospital service since the last note was generated.  Service: Hospital Medicine    Final Active Diagnoses:    Diagnosis Date Noted POA    PRINCIPAL PROBLEM:  Encephalopathy [G93.40] 05/26/2017 Yes    Severe sepsis [A41.9, R65.20] 12/03/2019 Yes    CNS vasculitis failed imuran; on cytoxan [I77.6] 06/02/2017 Yes    Type 2 diabetes mellitus with diabetic polyneuropathy, with long-term current use of insulin [E11.42, Z79.4] 05/14/2017 Not Applicable    Mixed hyperlipidemia [E78.2] 11/09/2012 Yes      Problems Resolved During this Admission:       Discharged Condition: stable    Disposition: Skilled Nursing Facility    Follow Up:  Follow-up Information     Edgar Nova MD.    Specialty:  Internal Medicine  Contact information:  [address] [place] [address]  Stefanie HONG [address] [phone]                 Patient Instructions:   No discharge procedures on file.    Significant Diagnostic Studies: Labs: All labs within the past 24 hours have been reviewed    Pending Diagnostic Studies:     None         Medications:  Reconciled Home Medications:      Medication List      ASK your doctor about these medications    alclomethasone 0.05 % cream  Commonly known as:  ACLOVATE     alendronate 70 MG tablet  Commonly known as:  FOSAMAX  Take 1 tablet (70 mg total) by mouth every 7 days.     aspirin 81 MG EC tablet  Commonly known as:  ECOTRIN  Take 81 mg by mouth once daily.     atenolol 50 MG tablet  Commonly known  "as:  TENORMIN  TAKE 1 TABLET BY MOUTH ONCE DAILY     atorvastatin 40 MG tablet  Commonly known as:  LIPITOR  TAKE 1 TABLET BY MOUTH ONCE DAILY     blood sugar diagnostic Strp  Commonly known as:  True Metrix Glucose Test Strip  Test blood sugar four times a day     DILT- MG Cdcr  Generic drug:  diltiaZEM  TAKE 1 CAPSULE BY MOUTH ONCE DAILY     donepezil 5 MG tablet  Commonly known as:  ARICEPT  Take 1 tablet (5 mg total) by mouth every evening. Start with 1/2 tab daily x 1 week then whole tablet maintenance     flash glucose sensor Kit  Commonly known as:  FreeStyle Patrick 14 Day Sensor  Glucose checking 4 times a day     FreeStyle Patrick 14 Day Marydel Misc  Generic drug:  flash glucose scanning reader  GLUCOSE TESTING : FASTING AND PRIOR TO MEALS     insulin aspart U-100 100 unit/mL (3 mL) Inpn pen  Commonly known as:  NovoLOG  Inject 20 Units into the skin 3 (three) times daily with meals.     insulin glargine 100 units/mL (3mL) SubQ pen  Commonly known as:  Basaglar KwikPen U-100 Insulin  Inject 40 Units into the skin 2 (two) times daily. Up to 60 BID with cytoxan     insulin syringe-needle U-100 0.5 mL 30 gauge x 5/16" Syrg  Commonly known as:  Insulin Syringe  Use 3x/day     irbesartan 300 MG tablet  Commonly known as:  AVAPRO  TAKE 1 TABLET BY MOUTH ONCE DAILY IN THE EVENING     ketoconazole 2 % cream  Commonly known as:  NIZORAL     * liraglutide 0.6 mg/0.1 mL (18 mg/3 mL) subq PNIJ 0.6 mg/0.1 mL (18 mg/3 mL) Pnij  Commonly known as:  Victoza 3-Matt  Inject 1.8 mg into the skin once daily.     * Victoza 2-Matt 0.6 mg/0.1 mL (18 mg/3 mL) Pnij  Generic drug:  liraglutide 0.6 mg/0.1 mL (18 mg/3 mL) subq PNIJ  INJECT 1.8 MG INTO THE SKIN ONCE DAILY     metFORMIN 500 MG 24 hr tablet  Commonly known as:  GLUCOPHAGE-XR  Take 2 tablets (1,000 mg total) by mouth 2 (two) times daily with meals.     methylphenidate HCl 10 MG tablet  Commonly known as:  RITALIN  Take 1 tablet (10 mg total) by mouth 2 (two) times daily " "with meals.     omega-3 fatty acids-vitamin E 1,000 mg Cap  Commonly known as:  Fish Oil  Take 1 capsule by mouth 2 (two) times daily.     pen needle, diabetic 32 gauge x 5/32" Ndle  Commonly known as:  BD Ultra-Fine Geovanna Pen Needle  4x daily insulin pen needle, relion     QUEtiapine 25 MG Tab  Commonly known as:  SEROQUEL  Take 1 tablet (25 mg total) by mouth every evening.     sertraline 100 MG tablet  Commonly known as:  ZOLOFT  TAKE 1 & 1/2 (ONE & ONE-HALF) TABLETS BY MOUTH ONCE DAILY     TRUEplus Lancets 30 gauge Misc  Generic drug:  lancets  Test blood sugar four times a day     vitamin D 1000 units Tab  Commonly known as:  VITAMIN D3  Take 5 tablets (5,000 Units total) by mouth once daily.         * This list has 2 medication(s) that are the same as other medications prescribed for you. Read the directions carefully, and ask your doctor or other care provider to review them with you.                Indwelling Lines/Drains at time of discharge:   Lines/Drains/Airways     Central Venous Catheter Line                 Port A Cath Single Lumen 12/07/18 1219 right internal jugular 367 days                Time spent on the discharge of patient: 35 minutes  Patient was seen and examined on the date of discharge and determined to be suitable for discharge.         Josefina Urena MD  Department of Hospital Medicine  [facility]  "

## 2019-12-10 NOTE — PLAN OF CARE
Problem: Adult Inpatient Plan of Care  Goal: Plan of Care Review  Outcome: Ongoing, Progressing     Problem: Adult Inpatient Plan of Care  Goal: Patient-Specific Goal (Individualization)  Outcome: Ongoing, Progressing     Problem: Adult Inpatient Plan of Care  Goal: Readiness for Transition of Care  Outcome: Ongoing, Progressing     Problem: Adult Inpatient Plan of Care  Goal: Rounds/Family Conference  Outcome: Ongoing, Progressing

## 2019-12-10 NOTE — PT/OT/SLP EVAL
Speech Language Pathology  Evaluation    Bessy Nunez   MRN: [id number]   Admitting Diagnosis: sepsis 2/2 UTI    Diet recommendations: Solid Diet Level: Regular  Liquid Diet Level: Thin 1 bite/sip at a time, Alternating bites/sips, Avoid talking while eating, HOB to 90 degrees, Monitor for s/s of aspiration, Small bites/sips and Standard aspiration precautions    SLP Treatment Date: 12/10/19  Speech Start Time: 1114     Speech Stop Time: 1140     Speech Total (min): 26 min       TREATMENT BILLABLE MINUTES:  Eval 18  and Eval Swallow and Oral Function 8    Diagnosis: sepsis 2/2 UTI      Past Medical History:   Diagnosis Date    Amblyopia     Cataract     CNS vasculitis 6/2/2017    Follows with Dr. Love By mri.  Several active lesions, many old. 5/13: MRA brain/neck, MRI brain w/wo contrast show no vascular occlusion, multiple foci of hyperintensity in deep cerebral periventricular white matter in pattern of demyelinating process.  5/17: MRI spine performed, no spinal cord lesions. Hospitalization 6/2017. EEG was performed negative for seizures.  Repeat MRI showing new lesion, question whether this was demyelination versus CVA. 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 Findings compatible with a small acute lacunar infarct adjacent to the left frontal horn.  Nonspecific enhancement just inferior to this region and extending into the left basal ganglia, as well as within the inferior aspect of the left cerebellum.  No evidence of a focal mass. 2.  Extensive increased signal intensity involving the periventricular white matter compatible with demyelinating disease versus vasculitis. 3.  Clinical correlation and followup recommended. 4.  This reportedly flattened in the EPIC and the corpus callosum  medical record system.    Mixed hyperlipidemia 11/9/2012    NAFLD (nonalcoholic fatty liver disease) 10/11/2013    CHASE (nonalcoholic steatohepatitis)     Obesity     Stroke     Type 2 diabetes mellitus with diabetic polyneuropathy, with long-term current use of insulin 5/14/2017    Type 2 diabetes mellitus with hyperglycemia, with long-term current use of insulin 10/11/2013     Past Surgical History:   Procedure Laterality Date    COLONOSCOPY N/A 5/21/2019    Procedure: COLONOSCOPY;  Surgeon: Alex Ascencio MD;  Location: [facility];  Service: Endoscopy;  Laterality: N/A;  confirmed appt-sp    INSERTION OF TUNNELED CENTRAL VENOUS CATHETER (CVC) WITH SUBCUTANEOUS PORT N/A 12/7/2018    Procedure: [id number];  Surgeon: Luan Diagnostic Provider;  Location: [facility] OR [address] [place] [address];  Service: Radiology;  Laterality: N/A;    SKIN CANCER EXCISION         Has the patient been evaluated by SLP for swallowing? : Yes  Keep patient NPO?: No   General Precautions: Standard, aspiration, fall, diabetic    Current Respiratory Status: room air     HPI:   57 yo M with PMHx primary CNS Vasculitis (on cytoxan monthly), DM2, and Hx of CVA presented to Neurology clinic today for usual f/u. While in clinic, the patient's wife reported that the patient had been acting unusually over the last week or so. She reports that he has been refusing baths, hiding his medication, and urinating on himself frequently. The patient has some cognitive dysfunction from his CNS vasculitis at baseline, but the patient usually is competent and compliant with his grooming and medications. The patient and his wife deny and fevers, cough, nausea, vomiting, or diarrhea. The patient's wife does report that he has not been having as much PO intake as usual. The patient states that he is fine and nothing is wrong. He is alert to person and situation but not year.     * No surgery found *       Hospital Course:   Admitted o  on 12/4 for acute  "encephalopathy and sepsis due to UTI.  Started empirically on zosyn. Febrile overnight. Wife concerned about worsening mental status this morning and seizure like activity (rt leg shaking and tachypnea with unresponsiveness) in the morning. Neurology consulted and recommended MRI w/wo contrast and EEG. Disoriented to time, but awakens and answers some questions appropriately during eval. Denies dysuria, N/V, SOB. MRI brain w chronic ischemic change, no evidence of recent infarction or other acute intracranial pathology. Abx de-escalated to levaquin to cover for possible lower resp infection vs UTI. BCx NGTD. Labs significant for resolution of UTI and worsening thrombocytopenia. Heparin subQ discontinued, however, has had plts as low as 78 in the past. On 12/7 platelet count improving. Completed 7 day course abx. Menatl status remained at baseline. Medically stable for discharge to SNF.    Social Hx: Patient lives with wife. Pt is only left alone for short periods of time when wife has to run an errand. Pt stopped driving 2 years ago, but drove wife home from the hospital 2 weeks ago.  Wife stated "I scared me. He wont' be driving anymore." Wife attributes decreased driving ability to visual difficulties.  Pt's wife manages pt's medications and finances.      Prior diet: regular consistencies/thin liquids    Subjective:  "I'd like to say no, but yes." pt's response when asked if he still drives. Pt's wife confirmed pt's current driving status (see Social hx).  "There's a lot of confusion." pt's wife states pt is below baseline for cognition since this hospitalization.          Objective:        Oral Musculature Evaluation  Oral Musculature: WFL  Dentition: present and adequate(a few missing lower ridge)  Secretion Management: adequate  Mucosal Quality: good  Mandibular Strength and Mobility: WFL  Lingual Strength and Mobility: WFL  Velar Elevation: WFL  Buccal Strength and Mobility: WFL  Volitional Swallow: " elicited  Voice Prior to PO Intake: dry, clear, mild hoarseness     Cognitive Status:  Behavioral Observations: alert and confused-  Memory and Orientation: O x 4. Recall of general information was WFL. Pt immediately recalled up to 7 digits in a series. Following a 3 minute delay, pt recalled 3/3 words with min cues (2/3 ind'ly).   Attention: decreased sustained attention  Problem Solving: Solutions to problems were stated with 83% accuracy.  Multiple causes for a hypothetical situation were generated with min cues.   Pragmatics: flat affect and initiation problems  Executive Function: initiation, insight/awareness and mental flexibility    Language: delayed     Auditory Comprehension: answers yes/no questions complex = 80% accuracy ind'ly/100% given cues.  Pt followed 2-step command accurately and complex command accurately, but was unable to follow a 3-step command despite cues.     Verbal Expression: Pt appears able to communicate basic wants/needs.  Responses to questions about premorbid functional independence were vague at times and reliability was questionable.  During a word fluency task, pt listed 7 items in a category in one minute given cue x 1 (15-20 is wnl).    Motor Speech: wfl    Voice: mild hoarseness    Reading: tba    Writing: tba    Visual-Spatial: tba    Bedside Swallow Eval:  Consistencies Assessed: Thin liquids cup sip x 1, straw sips x 4  Solids 1/2 cracker (3 portions)  Oral Phase: WNL  Pharyngeal Phase: dry cough after 3rd bite of solid    Additional Treatment:  Education provided to pt and wife regarding role of SLP, reason for SLP consult, cognition, cognitive changes associated with infection, SLP POC, and SLP treatment plan.  Pt/wife in agreement with plan.     Assessment:  Bessy Nunez is a 59 y.o. male with a medical diagnosis of <principal problem not specified> and presents with cognitive-linguistic deficits below baseline per wife.      Discharge recommendations: Discharge  Facility/Level of Care Needs: (tbd)     Diet recommendations: Solid Diet Level: Regular  Liquid Diet Level: Thin     Goals:   Multidisciplinary Problems     SLP Goals        Problem: SLP Goal    Goal Priority Disciplines Outcome   SLP Goal     SLP Ongoing, Progressing   Description:  Speech Language Pathology Goals  Goals expected to be met by 12/17:  1. Pt will tolerate a regular consistency diet and thin liquids without s/s of aspiration.   2. Pt will recall 3/3 words or facts after a 3 minute delay given supervision.   3. Pt will answer complex yes/no q's with 80% accuracy.   4. Pt will follow 3-step commands with 70% accuracy given moderate cues.   5. Pt will complete problem solving tasks with 80% accuracy given min-mod cues.   6. Pt will list 8 items in a category within one minute given min-mod cues.  7. Pt will participate in further assessment of reading, writing, and visual spatial abilities to determine need for further intervention.                          Plan:   Patient to be seen Therapy Frequency: 3 x/week   Planned Interventions: Dysphagia Therapy, Cognitive-Linguistic Therapy  Plan of Care expires: 01/09/20  Plan of Care reviewed with: patient, spouse  SLP Follow-up?: Yes              JULIO Mckeon, CCC-SLP  12/10/2019      JULIO Mckeon, CCC-SLP  Speech Language Pathologist  [phone]  12/10/2019

## 2019-12-10 NOTE — H&P
Hospital Medicine  History and Physical Exam    Admit Date: 12/9/2019  BELIA TBD  Principal Problem:  Sepsis due to urinary tract infection   Primary care Physician: Edgar Nova MD  Code status: Full Code    HPI:    59 yo M with PMHx primary CNS Vasculitis (on cytoxan monthly), DM2, and Hx of CVA presented to Neurology clinic 12/3 for usual f/u. While in clinic, the patient's wife reported that the patient had been acting unusually over the last week with refusing baths, hiding his medication, and urinating on himself frequently. The patient has some cognitive dysfunction from his CNS vasculitis at baseline, but the patient usually is competent and compliant with his grooming and medications. No associated fever, chills, nausea or vomiting. Patient was found to have E coli UTI with severe sepsis. He developed convulsions during his stay. Neurology evaluated patient. MRI brain did not show acute process and EEG was unremarkable for seizure. Convulsions were thought to be from septic encephalopathy. Patient's mental status returned to baseline. He completed a 7 day course of antibiotics. Patient denies any complaints.    Patient transferred to [facility] with PT and OT to improve functional status and ability to perform ADLs.     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    Past Surgical History: Patient has  "a past surgical history that includes Skin cancer excision; Insertion of tunneled central venous catheter (CVC) with subcutaneous port (N/A, 12/7/2018); and Colonoscopy (N/A, 5/21/2019).    Social History: Patient reports that he has never smoked. He has never used smokeless tobacco. He reports that he does not drink alcohol or use drugs.    Family History: family history includes Cancer in his father; Cataracts in his mother; Diabetes in his maternal aunt; Heart disease in his mother; Heart failure in his mother; Hypertension in his mother; Rheum arthritis in his paternal grandmother; Stroke in his sister.    Medications: reviewed     Allergies: Patient has No Known Allergies.    ROS  Constitutional: no fever or chills  Respiratory: no cough or shortness of breath  Cardiovascular: no chest pain or palpitations  Gastrointestinal: no nausea or vomiting, no abdominal pain or change in bowel habits  Genitourinary: no hematuria or dysuria  Integument/Breast: no rash or pruritis  Hematologic/Lymphatic: no easy bruising or lymphadenopathy  Musculoskeletal: no arthralgias or myalgias  Neurological: no seizures or tremors  Behavioral/Psych: no depression or anxiety    PEx  Temp:  [98 °F (36.7 °C)-98.2 °F (36.8 °C)]   Pulse:  [61-67]   Resp:  [18-20]   BP: (142-155)/(70-86)   SpO2:  [97 %-100 %]   Body mass index is 30.87 kg/m².     General appearance: no distress  Mental status: Alert and oriented to person, place, situation, bt states year is "1919"  HEENT:  conjunctivae/corneas clear, PERRL  Neck: supple, thyroid not enlarged  Pulm:   normal respiratory effort, CTA B, no c/w/r  Card: RRR, no murmur  Abd: soft, NT, ND, BS present; no masses, no organomegaly  Ext: no edema  Pulses: 2+, symmetric  Skin: color, texture, turgor normal. No rashes or lesions  Neuro: CN II-XII grossly intact, no focal numbness or weakness, normal strength and tone     Recent Labs   Lab 12/07/19  0457 12/08/19  0415 12/09/19  0412    142 142 "   K 4.2 3.5 3.6    111* 112*   CO2 25 21* 22*   BUN 15 22 19   CREATININE 1.0 1.2 1.0   * 217* 129*   CALCIUM 9.3 9.4 8.8     Recent Labs   Lab 12/07/19  0457   INR 1.0       Recent Labs   Lab 12/07/19  0457 12/08/19  0415 12/09/19  0412   WBC 4.69 3.76* 4.95   HGB 9.9* 9.2* 9.1*   HCT 29.9* 27.7* 27.8*   PLT 94* 103* 134*   GRAN 69.2  3.2 59.6  2.2 62.1  3.1   LYMPH 14.7*  0.7* 22.3  0.8* 21.8  1.1   MONO 13.2  0.6 14.1  0.5 10.9  0.5       Recent Labs   Lab 12/09/19  1129 12/09/19  1831 12/09/19  2059 12/10/19  0711 12/10/19  1134 12/10/19  1600   POCTGLUCOSE 202* 131* 203* 122* 165* 135*       Assessment and Plan:    Severe Sepsis 2/2 UTI  Complicated E. Coli UTI  Completed course of antibiotics     Septic Encephalopathy  Acute metabolic encephalopathy  At baseline     CNS Vasculitis  dementia with behavioral disturbance  Receives cytoxan monthly  Continue meds donepezil 5 mg qhs, sertraline 100 mg daily, quetiapine 25 mg qhs, and methylphenidate 10 mg BID  Vitamin D 5000 unit daily  Speech therapy     Candidal Intertrigo  -Rash noted in folds of groin, will treat with topical miconazole     Chronic diastolic dysfunction - euvolemic    MANISH/ATN due to sepsis   Resolved     DM2 with CKD II and HTN  12/4 HgbA1c now 7.4%  Home lantus 40 units BID (60 units BID with cytoxan infusions), novolog 20 units subq TID and liraglutide 1.8 mg subq daily, metformin 1 BID  Continue lantus 24 units subq qhs  Metformin 1 g BID    Essential HTN  Continue home atenolol 50 mg daily, diltiazem 240 mg daily, irbesartan 300 mg qhs  --prn hydralazine for sbp >180    CHASE Cirrhosis - stable     DM II and Dyslipidemia:  - continue atorvastatin 40 mg daily     Hx of CVA  Left sided weakness  continue asa 81 mg daily  Continue atorvastatin     Chemotherapy induced Thrombocytopenia  Attributed to cytoxan    Anemia of iron deficiency   Anemia associated with chemotherapy  Iron sulfate 325 mg + ascorbic acid 250 mg  qhs  Stool occult    DJD of multiple sites - PT/OT    Osteoporosis - receiving vitamin D supplementation and on bisphosphonate therapy    Depression - sertraline 100 mg daily    Continue the following medications for treatment of the indicated conditions:  ·  Indication Medication Dose Route  Frequency       Iron absorption ascorbic acid (vitamin C)  250 mg Oral Q48H    MI prevention aspirin  81 mg Oral Daily    HTN atenolol  50 mg Oral Daily    hyperlipidemia atorvastatin  40 mg Oral Daily    HTN diltiaZEM  240 mg Oral Daily    Cognitive impairment donepezil  5 mg Oral QHS    Iron deficiency ferrous sulfate  325 mg Oral Q48H    DM II insulin detemir U-100  24 Units Subcutaneous Daily    HTN irbesartan  300 mg Oral QHS    DM II metFORMIN  1,000 mg Oral BID WM    CNS vasculitis methylphenidate HCl  10 mg Oral BID WM    Candidal intertrigo miconazole NITRATE 2 %   Topical (Top) BID    Cognitive impairment QUEtiapine  25 mg Oral QHS    BM regimen senna-docusate 8.6-50 mg  1 tablet Oral BID    depression sertraline  100 mg Oral Daily       Future Appointments   Date Time Provider Department Center   12/16/2019 10:00 AM Kay Troy MD [facility] RHE[facility] [address]   12/19/2019  1:00 PM Edgar Nova MD [facility] FAM MED [place]   12/23/2019 11:00 AM Reese Holland LCSW [facility] [address]   1/6/2020  1:30 PM GABRIEL Somers, CNS [facility] [address]   2/7/2020  1:00 PM DC, VISUAL FIELDS NOM OPHTHAL [address]   2/7/2020  1:30 PM Lobito Willson MD [facility]     I certify that SNF services are required to be given on an inpatient basis because Bessy Nunez needs for skilled nursing care and/or skilled rehabilitation are required on a daily basis and such services can only practically be provided in a skilled nursing facility setting and are for an ongoing condition for which she received inpatient care in the hospital.     Time (minutes) spent in care of the patient including  face-to-face contact and coordination of care while on unit: 60 minutes    Rashida Fraire MD

## 2019-12-10 NOTE — PLAN OF CARE
PT eval completed. Pt will begin PT POC.  Sandra Donaldson, PT  12/10/2019    Problem: Physical Therapy Goal  Goal: Physical Therapy Goal  Description  Goals to be met by: 19     Patient will increase functional independence with mobility by performin. Supine to sit with Modified [place]  2. Sit to supine with Modified [place]  3. Sit to stand transfer with Modified [place]  4. Bed to chair transfer with Modified [place] w/ or w/o AD  4. Gait  x 150ft feet with Supervision using rollator  6. Gait x 100ft w/ SPV w/o AD  7. Ascend/Descend 4 inch curb step with Stand-by Assistance using rollator or RW  8. Stand for 5 minutes with Supervision w/ or w/o AD while performing activity  9. Pt will improve 5 times sit<>stand test time from 27.45s to 25s to decreased fall risk and improve functional mobility     Outcome: Ongoing, Progressing

## 2019-12-10 NOTE — PLAN OF CARE
Problem: SLP Goal  Goal: SLP Goal  Description  Speech Language Pathology Goals  Goals expected to be met by 12/17:  1. Pt will tolerate a regular consistency diet and thin liquids without s/s of aspiration.   2. Pt will recall 3/3 words or facts after a 3 minute delay given supervision.   3. Pt will answer complex yes/no q's with 80% accuracy.   4. Pt will follow 3-step commands with 70% accuracy given moderate cues.   5. Pt will complete problem solving tasks with 80% accuracy given min-mod cues.   6. Pt will list 8 items in a category within one minute given min-mod cues.  7. Pt will participate in further assessment of reading, writing, and visual spatial abilities to determine need for further intervention.        Outcome: Ongoing, Progressing    Swallow and speech/language/cognitive evaluations completed.  Pt's wife stating pt is below baseline for cognition since this hospitalization.  SLP to follow 3x/wk to address cognition and monitor diet tolerance.  JULIO Mckeon, CCC-SLP  Speech Language Pathologist  [phone]  12/10/2019

## 2019-12-10 NOTE — PT/OT/SLP EVAL
Occupational Therapy  Evaluation/tx    Bessy Nunez   MRN: [id number]   Admitting Diagnosis: debility/ acute encephalopathy, sepsis/CNS vasculitis    OT Date of Treatment: 12/10/19   OT Start Time: 0830  OT Stop Time: 0924  OT Total Time (min): 54 min    Billable Minutes:  Evaluation 15  Self Care/Home Management 39    Diagnosis:  debility/ acute encephalopathy, sepsis/CNS vasculitis    Past Medical History:   Diagnosis Date    Amblyopia     Cataract     CNS vasculitis 6/2/2017    Follows with Dr. Love By mri.  Several active lesions, many old. 5/13: MRA brain/neck, MRI brain w/wo contrast show no vascular occlusion, multiple foci of hyperintensity in deep cerebral periventricular white matter in pattern of demyelinating process.  5/17: MRI spine performed, no spinal cord lesions. Hospitalization 6/2017. EEG was performed negative for seizures.  Repeat MRI showing new lesion, question whether this was demyelination versus CVA. 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 Findings compatible with a small acute lacunar infarct adjacent to the left frontal horn.  Nonspecific enhancement just inferior to this region and extending into the left basal ganglia, as well as within the inferior aspect of the left cerebellum.  No evidence of a focal mass. 2.  Extensive increased signal intensity involving the periventricular white matter compatible with demyelinating disease versus vasculitis. 3.  Clinical correlation and followup recommended. 4.  This reportedly flattened in the EPIC and the corpus callosum medical record system.    Mixed hyperlipidemia 11/9/2012    NAFLD (nonalcoholic fatty liver disease) 10/11/2013    CHASE (nonalcoholic steatohepatitis)     Obesity     Stroke     Type 2 diabetes mellitus  with diabetic polyneuropathy, with long-term current use of insulin 5/14/2017    Type 2 diabetes mellitus with hyperglycemia, with long-term current use of insulin 10/11/2013      Past Surgical History:   Procedure Laterality Date    COLONOSCOPY N/A 5/21/2019    Procedure: COLONOSCOPY;  Surgeon: Alex Ascencio MD;  Location: [facility];  Service: Endoscopy;  Laterality: N/A;  confirmed appt-sp    INSERTION OF TUNNELED CENTRAL VENOUS CATHETER (CVC) WITH SUBCUTANEOUS PORT N/A 12/7/2018    Procedure: NCONYLYXA-[id number]-G-CATH;  Surgeon: Luan Diagnostic Provider;  Location: [facility] OR [address] [place] [address];  Service: Radiology;  Laterality: N/A;    SKIN CANCER EXCISION           General Precautions: Standard, fall, 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  Orthopedic Precautions: N/A  Braces: N/A    Spiritual, Cultural Beliefs, Orthodoxy Practices, Values that Affect Care: no     Patient History:  Lives With: spouse  Living Arrangements: house  Home Accessibility: (no stairs to enter ground floor)  Equipment Currently Used at Home: bath bench    Prior level of function:    Bed Mobility/Transfers: independent  Grooming: independent  Bathing: needs device  Upper Body Dressing: independent  Lower Body Dressing: independent  Toileting: independent  Driving License: No  Occupation: Retired  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 report and spouse:  pt. resides in [facility].  pt. lives in a 2 story home but does not have to go upstairs.  Wife has had to provide S and verbal cues for pt. for dressing and getting OOB however pt. has declined in past month.  Pt. has had falls at home with most recent being ~ 1 month ago.      Dominant hand: right    Subjective:  Communicated with nurse prior to session.    Chief Complaint: no specific complaints  Patient/Family stated goals: To have pt. Get back to baseline: (until 1 month ago could dress self when pt.  Was vazquez in was urinating on himself and sleeping like 20 hours a day per spouse)    Pain/Comfort  Pain Rating 1: 0/10  Pain Rating Post-Intervention 1: 0/10    Objective:   Patient found with: (supine in bed with spouse present)    Cognitive Exam:  Oriented to: Person, Place, Time and Situation  Follows Commands/attention: Follows multistep  commands  Communication: flat affect  Memory:  Appears slow with processing   Safety awareness/insight to disability: fair  Coping skills/emotional control: Appropriate to situation    Visual/perceptual:      Physical Exam:  Postural examination/scapula alignment:    -       Rounded shoulders  -       Forward head  -       Posterior pelvic tilt  Skin integrity: Visible skin intact  Edema: Moderate in BLE (feet)    Sensation:      -       Intact    Upper Extremity Range of Motion:  Right Upper Extremity: WFL  Left Upper Extremity: WFL    Upper Extremity Strength:  Right Upper Extremity: WFL  Left Upper Extremity: WFL   Strength: good    Fine motor coordination:      -       Intact    Gross motor coordination: WFL in BUE    Occupational Performance:    Bed Mobility:    · Patient completed Supine to Sit with stand by assistance     Functional Mobility/Transfers:  · Patient completed Sit <> Stand Transfer with contact guard assistance  with  no assistive device   · Patient completed Bed <> Chair Transfer using Stand Pivot technique with contact guard assistance with no assistive device  · Patient completed Toilet Transfer Stand Pivot technique with contact guard assistance with  grab bars  · Functional Mobility: Pt. Ambulated in room with RW but required Min A 2/2 cognition and picking up walker; pt. Ambulated without the RW with CGA 2/2 to quick impulsive movements noted.     Activities of Daily Living:  · Grooming: stand by assistance seated at sink to wash face and brush teeth  · Bathing: minimum assistance with sponge bath at sink when standing to clean matt/buttocks  region/ vc's for bathing tasks  · Upper Body Dressing: stand by assistance to don pull over shirt and jacket with vc's to manage shirt down over trunk   · Lower Body Dressing: minimum assistance with assist to don socks; able to don pants with CGA  · Toileting: minimum assistance for clothing management and cleaning BM on this date however pt. had started BM and unaware as well as dipaer was soiled with urine on OT arrival    AMPA 6 Click:  AMPA Total Score: 16    OT Exercises: not performed    Additional Treatment:  Pt. And family educated on role of oT and pOC   pt. Educated on safety with ADL tasks and functional mobility    Patient left up in chair with call button in reach and spouse present (notified spouse to notify nurses if pt. Will be alone in room)    Assessment:   Bessy Nunez is a 59 y.o. male with a medical diagnosis of    debility/ acute encephalopathy, sepsis/CNS vasculitis and presents with limitations in functional mobility, self-care skills, endurance and cognition. Pt. Tolerated evaluation fairly on this date but does require Min A for ADL task performance and is a fall risk.  Pt. Would benefit from continued oT services.   Rehab identified problem list/impairments: impaired endurance, impaired functional mobilty, impaired self care skills, gait instability, impaired balance, edema, decreased safety awareness    Rehab potential is good    Activity tolerance: Good    Discharge recommendations: home health OT(with S)     Barriers to discharge: None     Equipment recommendations: bedside commode, walker, rolling     GOALS:   Multidisciplinary Problems     Occupational Therapy Goals        Problem: Occupational Therapy Goal    Goal Priority Disciplines Outcome Interventions   Occupational Therapy Goal     OT, PT/OT     Description:  Goals to be met by: 12-18-19     Patient will increase functional independence with ADLs by performing:    UE Dressing with Set-up Assistance.  LE Dressing with  Supervision  Grooming while standing with Supervision.  Toileting from toilet with Supervision for hygiene and clothing management.   Bathing from  shower chair/bench with Stand-by Assistance.  Supine to sit with Modified [place].  Stand pivot transfers with Supervision with RW  Toilet transfer to toilet with Supervision with RW  Pt. To engage in standing task x 8 minutes with S  Pt. To identify 3 leisure activities to engage in weekly   Pt. To be I with HEP to improve level of endurance                      PLAN: Patient to be seen 5 x/week to address the above listed problems via self-care/home management, therapeutic activities, therapeutic exercises  Plan of Care expires: 01/09/20  Plan of Care reviewed with: patient, spouse    COLOTN Lehman  12/10/2019

## 2019-12-11 LAB
POCT GLUCOSE: 131 MG/DL (ref 70–110)
POCT GLUCOSE: 143 MG/DL (ref 70–110)
POCT GLUCOSE: 184 MG/DL (ref 70–110)
POCT GLUCOSE: 96 MG/DL (ref 70–110)

## 2019-12-11 PROCEDURE — 97535 SELF CARE MNGMENT TRAINING: CPT

## 2019-12-11 PROCEDURE — 97110 THERAPEUTIC EXERCISES: CPT

## 2019-12-11 PROCEDURE — 25000003 PHARM REV CODE 250: Performed by: NURSE PRACTITIONER

## 2019-12-11 PROCEDURE — 97530 THERAPEUTIC ACTIVITIES: CPT

## 2019-12-11 PROCEDURE — 25000003 PHARM REV CODE 250: Performed by: INTERNAL MEDICINE

## 2019-12-11 PROCEDURE — 97116 GAIT TRAINING THERAPY: CPT

## 2019-12-11 PROCEDURE — 92507 TX SP LANG VOICE COMM INDIV: CPT

## 2019-12-11 PROCEDURE — 11000004 HC SNF PRIVATE

## 2019-12-11 PROCEDURE — 97802 MEDICAL NUTRITION INDIV IN: CPT

## 2019-12-11 RX ORDER — POLYETHYLENE GLYCOL 3350 17 G/17G
17 POWDER, FOR SOLUTION ORAL DAILY
Status: DISCONTINUED | OUTPATIENT
Start: 2019-12-11 | End: 2019-12-18 | Stop reason: HOSPADM

## 2019-12-11 RX ADMIN — METFORMIN HYDROCHLORIDE 1000 MG: 500 TABLET, FILM COATED ORAL at 08:12

## 2019-12-11 RX ADMIN — ATENOLOL 50 MG: 25 TABLET ORAL at 08:12

## 2019-12-11 RX ADMIN — POLYETHYLENE GLYCOL 3350 17 G: 17 POWDER, FOR SOLUTION ORAL at 02:12

## 2019-12-11 RX ADMIN — SENNOSIDES AND DOCUSATE SODIUM 1 TABLET: 8.6; 5 TABLET ORAL at 08:12

## 2019-12-11 RX ADMIN — METFORMIN HYDROCHLORIDE 1000 MG: 500 TABLET, FILM COATED ORAL at 05:12

## 2019-12-11 RX ADMIN — ATORVASTATIN CALCIUM 40 MG: 20 TABLET, FILM COATED ORAL at 08:12

## 2019-12-11 RX ADMIN — MICONAZOLE NITRATE: 20 POWDER TOPICAL at 08:12

## 2019-12-11 RX ADMIN — INSULIN ASPART 2 UNITS: 100 INJECTION, SOLUTION INTRAVENOUS; SUBCUTANEOUS at 05:12

## 2019-12-11 RX ADMIN — INSULIN DETEMIR 24 UNITS: 100 INJECTION, SOLUTION SUBCUTANEOUS at 08:12

## 2019-12-11 RX ADMIN — ASPIRIN 81 MG: 81 TABLET, DELAYED RELEASE ORAL at 08:12

## 2019-12-11 RX ADMIN — DONEPEZIL HYDROCHLORIDE 5 MG: 5 TABLET, FILM COATED ORAL at 08:12

## 2019-12-11 RX ADMIN — METHYLPHENIDATE HYDROCHLORIDE 10 MG: 10 TABLET ORAL at 05:12

## 2019-12-11 RX ADMIN — SERTRALINE HYDROCHLORIDE 150 MG: 50 TABLET ORAL at 09:12

## 2019-12-11 RX ADMIN — QUETIAPINE FUMARATE 25 MG: 25 TABLET ORAL at 08:12

## 2019-12-11 RX ADMIN — Medication 5000 UNITS: at 09:12

## 2019-12-11 RX ADMIN — IRBESARTAN 300 MG: 75 TABLET ORAL at 08:12

## 2019-12-11 RX ADMIN — METHYLPHENIDATE HYDROCHLORIDE 10 MG: 10 TABLET ORAL at 08:12

## 2019-12-11 RX ADMIN — DILTIAZEM HYDROCHLORIDE 240 MG: 120 CAPSULE, COATED, EXTENDED RELEASE ORAL at 08:12

## 2019-12-11 NOTE — PT/OT/SLP PROGRESS
Speech Language Pathology  Treatment    Bessy Nunez   MRN: [id number]   Admitting Diagnosis: Sepsis due to urinary tract infection    Diet recommendations: Solid Diet Level: Regular  Liquid Diet Level: Thin HOB to 90 degrees, Monitor for s/s of aspiration and Standard aspiration precautions    SLP Treatment Date: 12/11/19  Speech Start Time: 1141     Speech Stop Time: 1206     Speech Total (min): 25 min       TREATMENT BILLABLE MINUTES:  Speech Therapy Individual 17 and Seld Care/Home Management Training 8    Has the patient been evaluated by SLP for swallowing? : Yes  Keep patient NPO?: No   General Precautions: Standard, aspiration, fall, diabetic  Current Respiratory Status: room air       Subjective:  Pt seen at bedside. Pt appearing to be napping upon entry, but woke easily.        Objective:      Pt completed a delayed memory task recalling simple facts after a 3 minute delay with 67% accuracy.  [place] convergent categorization tasks were completed with 100% accuracy. Abstract tasks were completed with 60% accuracy ind'ly/100% given cues.  Pt compared 2 given items with supervision.  He contrasted the items with 40% accuracy ind'ly/100% given cues.  A mental manipulation task was introduced, but task and session were ended when NP arrived to complete initial examination.  Education was provided during session regarding role of SLP, results of initial evaluation, cognition, benefits of cognitive therapy, and SLP treatment plan and POC. Pt will benefit from continued reinforcement.     Assessment:  Bessy Nunez is a 59 y.o. male with a medical diagnosis of Sepsis due to urinary tract infection and presents with cognitive-linguistic deficits.     Discharge recommendations: Discharge Facility/Level of Care Needs: (tbd)     Goals:   Multidisciplinary Problems     SLP Goals        Problem: SLP Goal    Goal Priority Disciplines Outcome   SLP Goal     SLP Ongoing, Progressing   Description:  Speech Language  Pathology Goals  Goals expected to be met by 12/17:  1. Pt will tolerate a regular consistency diet and thin liquids without s/s of aspiration.   2. Pt will recall 3/3 words or facts after a 3 minute delay given supervision.   3. Pt will answer complex yes/no q's with 80% accuracy.   4. Pt will follow 3-step commands with 70% accuracy given moderate cues.   5. Pt will complete problem solving tasks with 80% accuracy given min-mod cues.   6. Pt will list 8 items in a category within one minute given min-mod cues.  7. Pt will participate in further assessment of reading, writing, and visual spatial abilities to determine need for further intervention.                          Plan:   Patient to be seen Therapy Frequency: 3 x/week  Planned Interventions: Cognitive-Linguistic Therapy, Dysphagia Therapy  Plan of Care expires: 01/09/20  Plan of Care reviewed with: patient  SLP Follow-up?: Yes              JULIO Mckeon, CCC-SLP  12/11/2019     JULIO Mckeon, CCC-SLP  Speech Language Pathologist  [phone]  12/11/2019

## 2019-12-11 NOTE — PLAN OF CARE
Problem: Physical Therapy Goal  Goal: Physical Therapy Goal  Description  Goals to be met by: 19     Patient will increase functional independence with mobility by performin. Supine to sit with Modified [place]  2. Sit to supine with Modified [place]  3. Sit to stand transfer with Modified [place]  4. Bed to chair transfer with Modified [place] w/ or w/o AD  5. Gait  x 150ft feet with Supervision using rollator  6. Gait x 100ft w/ SPV w/o AD  7. Ascend/Descend 4 inch curb step with Stand-by Assistance using rollator or RW  8. Stand for 5 minutes with Supervision w/ or w/o AD while performing activity met 2019  9. Pt will improve 5 times sit<>stand test time from 27.45s to 25s to decreased fall risk and improve functional mobility       Outcome: Ongoing, Progressing

## 2019-12-11 NOTE — PLAN OF CARE
Problem: SLP Goal  Goal: SLP Goal  Description  Speech Language Pathology Goals  Goals expected to be met by 12/17:  1. Pt will tolerate a regular consistency diet and thin liquids without s/s of aspiration.   2. Pt will recall 3/3 words or facts after a 3 minute delay given supervision.   3. Pt will answer complex yes/no q's with 80% accuracy.   4. Pt will follow 3-step commands with 70% accuracy given moderate cues.   5. Pt will complete problem solving tasks with 80% accuracy given min-mod cues.   6. Pt will list 8 items in a category within one minute given min-mod cues.  7. Pt will participate in further assessment of reading, writing, and visual spatial abilities to determine need for further intervention.        Outcome: Ongoing, Progressing  Cont POC.   JULIO Mckeon, CCC-SLP  Speech Language Pathologist  [phone]  12/11/2019

## 2019-12-11 NOTE — PT/OT/SLP PROGRESS
"Occupational Therapy  Treatment    Bessy Nunez   MRN: [id number]   Admitting Diagnosis: Sepsis due to urinary tract infection    OT Date of Treatment: 12/11/19       Billable Minutes:  Self Care/Home Management 45    General Precautions: Standard, fall, diabetic(blind in right eye)  Orthopedic Precautions: N/A  Braces: N/A    Spiritual, Cultural Beliefs, Zoroastrianism Practices, Values that Affect Care: no    Subjective:  Communicated with nurse Zofia prior to session.  Pt reported he did not need to utilize the commode on approach yet when encouraged to "try" it was noted that he had had an episode of bowel incontinence which he did not make known to nurse or therapist.    Pain/Comfort  Pain Rating 1: 0/10  Pain Rating Post-Intervention 1: 0/10    Objective:   Pt seen in his room for ADL retraining as noted below:    Bed Mobility:    · CGA for rolling, supine <> sit utilizing bedrail and HOB elevated.      Functional Mobility/Transfers:  · Patient completed Sit <> Stand Transfer with contact guard assistance  with  hand-held assist   · Patient completed Bed <> Chair Transfer using Step Transfer technique with contact guard assistance with hand-held assist  · Patient completed Toilet Transfer Stand Pivot technique with contact guard assistance with  hand-held assist and grab bars  · Patient completed  Shower Transfer Step Transfer technique with contact guard assistance with hand-held assist  · Functional Mobility: Pt requires CGA for all functional transfers to ensure safety and facilitate proximal control.    Activities of Daily Living:  · Grooming: supervision sitting w/c level at sink  · Bathing: minimum assistance for posterior perineal care, back and max A for set up this day.  · Upper Body Dressing: supervision after set up  · Lower Body Dressing: minimum assistance with setup  · Toileting: dependence due to incontinence    AMPAC 6 Click:  AMPAC Total Score: 16    Additional Treatment:  OT provided education to " this patient re: safe techniques for self set up, progressing ADL tasks to ambulatory level and maximizing functional endurance and standing balance during all activities of daily living.    Patient left up in chair with call button in reach    ASSESSMENT:  Bessy Nunez is a 59 y.o. male with a medical diagnosis of Sepsis due to urinary tract infection who is steadily improving yet continues to require extended time, VC's and physical assist to safely perform ADL's and all functional mobility required to perform such ADL's. He continues to perform majority of ADL tasks in seated position due to standing balance/ tolerance deficits as well as LOB. He is being facilitated by OT to progress to ambulatory level during ADL's. He also requires assist to sequence his task and encouragement to direct his own care (I.e. Noted incontinence episode for which he did not identify care needs).    Rehab identified problem list/impairments: impaired endurance, impaired functional mobilty, impaired self care skills, gait instability, impaired balance, edema, decreased safety awareness    Rehab potential is good    Activity tolerance: Good    Discharge recommendations: home health OT(with S)     Barriers to discharge: None     Equipment recommendations: bedside commode, walker, rolling     GOALS:   Multidisciplinary Problems     Occupational Therapy Goals        Problem: Occupational Therapy Goal    Goal Priority Disciplines Outcome Interventions   Occupational Therapy Goal     OT, PT/OT     Description:  Goals to be met by: 12-18-19     Patient will increase functional independence with ADLs by performing:    UE Dressing with Set-up Assistance.Met 12/11/19  LE Dressing with Supervision  Grooming while standing with Supervision.  Toileting from toilet with Supervision for hygiene and clothing management.   Bathing from  shower chair/bench with Stand-by Assistance.  Supine to sit with Modified [place].  Stand pivot transfers  with Supervision with RW  Toilet transfer to toilet with Supervision with RW  Pt. To engage in standing task x 8 minutes with S  Pt. To identify 3 leisure activities to engage in weekly   Pt. To be I with HEP to improve level of endurance                       Plan:  Patient to be seen 5 x/week to address the above listed problems via self-care/home management, therapeutic activities, therapeutic exercises  Plan of Care expires: 01/09/20  Plan of Care reviewed with: patient    Carmencita Ocasio, OT  12/11/2019

## 2019-12-11 NOTE — PT/OT/SLP PROGRESS
"Physical Therapy  Treatment    Bessy Nunez   MRN: [id number]   Admitting Diagnosis: Sepsis due to urinary tract infection    PT Received On: 12/11/19          Billable Minutes:  Gait Training 23, Therapeutic Activity 15 and Therapeutic Exercise 15    Treatment Type: Treatment  PT/PTA: PTA     PTA Visit Number: 2       General Precautions: Standard, aspiration, fall, diabetic  Orthopedic Precautions: N/A   Braces: N/A    Spiritual, Cultural Beliefs, Scientology Practices, Values that Affect Care: no    Subjective:  "I'm good" Pt agreebale to therapy    Pain/Comfort  Pain Rating 1: 0/10  Pain Rating Post-Intervention 1: 0/10    Objective:  Patient found prone in bed       AM-PAC 6 CLICK MOBILITY  Total Score:19    Bed Mobility:  Supine>Sit: Mod I HOB elevated  Rolling prone>supine: Mod I    Transfers:  Sit<>Stand: S t/f bed and wc with rollator and no AD, vcs for hand placement and locking wheels  Stand Pivot Transfer: bed>wc>chair S no AD    Gait:  Amb 3 trials 120 rollator S, 76' no AD CGA, 130' rollator S, vcs for upright posture and decreased justin for safety, seated rest break between trials     Advanced Gait:  Curb Step: 4" CGA rollator vcs for tech    Wheelchair Mobility:  Patient propels wc using BLE on and off unit Mod I     Therex:  2 x 10 AP, LAQ, HF, abd/add, GS    Balance:  Dynamic standing at counter unilateral UE support while performing card matching activity S 7:45    Additional Treatment:  Patient educated on importance of increased time out of bed and JEROMY throughout the day    Patient left up in chair with call button in reach.    Assessment:  Bessy Nunez is a 59 y.o. male with a medical diagnosis of Sepsis due to urinary tract infection.  Patient tolerated treatment well focusing on bed mobility, transfers, gait, therex and standing balance/tolerance. Patient will continue to improve with skilled physical therapy services in order to return to functional baseline.    Rehab identified problem " list/impairments: impaired self care skills, impaired functional mobilty, gait instability, impaired balance    Rehab potential is good.    Activity tolerance: Good    Discharge recommendations: home with home health(w/ SPV from wife)     Barriers to discharge: None    Equipment recommendations: bedside commode     GOALS:   Multidisciplinary Problems     Physical Therapy Goals        Problem: Physical Therapy Goal    Goal Priority Disciplines Outcome Goal Variances Interventions   Physical Therapy Goal     PT, PT/OT Ongoing, Progressing     Description:  Goals to be met by: 19     Patient will increase functional independence with mobility by performin. Supine to sit with Modified [place]  2. Sit to supine with Modified [place]  3. Sit to stand transfer with Modified [place]  4. Bed to chair transfer with Modified [place] w/ or w/o AD  5. Gait  x 150ft feet with Supervision using rollator  6. Gait x 100ft w/ SPV w/o AD  7. Ascend/Descend 4 inch curb step with Stand-by Assistance using rollator or RW  8. Stand for 5 minutes with Supervision w/ or w/o AD while performing activity met 2019  9. Pt will improve 5 times sit<>stand test time from 27.45s to 25s to decreased fall risk and improve functional mobility                        PLAN:    Patient to be seen 5 x/week  to address the above listed problems via gait training, therapeutic activities, therapeutic exercises  Plan of Care expires: 20  Plan of Care reviewed with: patient    Loren Sifuentes, PTA  2019

## 2019-12-11 NOTE — PROGRESS NOTES
[facility]                                  Skilled Nursing Facility                   Progress Note     Admit Date: 12/9/2019  BELIA TBD  Principal Problem:  Sepsis due to urinary tract infection   HPI obtained from patient interview and chart review     Chief Complaint: Establish Care/ Initial Visit     HPI:   Mr. Nunez is 58 year old male with PMHx of primary CNS Vasculitis (on cytoxan monthly), DM2, and Hx of CVA who presents to SNF following a hospitalization for urosepsis with UTI.  Admission to SNF for secondary weakness and debility.    Patient had originally presented to Neurology clinic on 12/3 for usual follow-up appointment and while there his wife reported that the patient had been acting strangely over the last week with refusing baths, hiding his medication, and urinating on himself frequently. The patient has some cognitive dysfunction from his CNS vasculitis at baseline, but the patient usually is competent and compliant with his grooming and medications.      Patient was found to have E coli UTI with severe sepsis. He developed convulsions during his stay. Neurology evaluated patient. MRI brain did not show acute process and EEG was unremarkable for seizure. Convulsions were thought to be from septic encephalopathy. Patient's mental status returned to baseline. He completed a 7 day course of antibiotics.     Today, patient reports that his last bowel movement was on 12/08.  Patient reports passing flatus and has active bowel sounds throughout all 4 quadrants.  He denies any abdominal discomfort.  Patient states that he normally goes every 1-2 days.  Patient denies any other complaints at this time.    Patient will be treated at [facility] with PT and OT to improve functional status and ability to perform ADLs.     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    Past Surgical History: Patient has a past surgical history that includes Skin cancer excision; Insertion of tunneled central venous catheter (CVC) with subcutaneous port (N/A, 12/7/2018); and Colonoscopy (N/A, 5/21/2019).    Social History: Patient reports that he has never smoked. He has never used smokeless tobacco. He reports that he does not drink alcohol or use drugs.    Family History: family history includes Cancer in his father; Cataracts in his mother; Diabetes in his maternal aunt; Heart disease in his mother; Heart failure in his mother; Hypertension in his mother; Rheum arthritis in his paternal grandmother; Stroke in his sister.    Allergies: Patient has No Known Allergies.    ROS  Constitutional: Negative for fever. + fatigue.   Eyes: Negative for blurred vision, double vision and discharge.   Respiratory: Negative for cough, shortness of breath and wheezing.    Cardiovascular: Negative for chest pain, palpitations, and leg swelling.   Gastrointestinal: Negative for abdominal pain, diarrhea, nausea and vomiting.  +constipation  Genitourinary: Negative for dysuria, frequency and urgency.   Musculoskeletal:  + generalized weakness. Negative for back pain and myalgias.   Skin: Negative for itching and rash.   Neurological: Negative for dizziness, speech change, and headaches.   Psychiatric/Behavioral: Negative for depression. The patient is not  nervous/anxious.      PEx  Temp:  [98.2 °F (36.8 °C)]   Pulse:  [60-62]   Resp:  [18]   BP: (139-161)/(70-95)   SpO2:  [95 %]      Constitutional: Patient appears well-developed and in no distress   HENT:   Head: Normocephalic and atraumatic.   Eyes: Pupils are equal, round  Neck: Normal range of motion. Neck supple.   Cardiovascular: Normal rate, regular rhythm and normal heart sounds.    Pulmonary/Chest: Effort normal and breath sounds are clear  Abdominal:  Protuberant, Soft. Bowel sounds are normal.   Musculoskeletal: Normal range of motion.   Neurological: Alert and oriented to person, place, and time.  No focal weakness noted  Psychiatric:  Flat affect. Behavior is normal.   Skin: Skin is warm and dry. Full skin assessment completed.  Moisture associated dermatitis noted to groin area    No results for input(s): GLUCOSE, NA, K, CL, CO2, BUN, CREATININE, MG in the last 24 hours.    Invalid input(s):  CALCIUM    No results for input(s): WBC, RBC, HGB, HCT, PLT, MCV, MCH, MCHC in the last 24 hours.      Assessment and Plan:    Severe Sepsis 2/2 UTI  Complicated E. Coli UTI  - resolved, Completed course of antibiotics; no current symptoms     Septic Encephalopathy  Acute metabolic encephalopathy  - neuro status is currently at baseline     CNS Vasculitis  dementia with behavioral disturbance  - Receives cytoxan monthly  - Continue meds donepezil 5 mg qhs, sertraline 100 mg daily, quetiapine 25 mg qhs, and methylphenidate 10 mg BID  - continue Vitamin D 5000 unit daily  - initiated Speech therapy evaluation     Candidal Intertrigo  - Rash noted in folds of groin, continue 2% topical miconazole     Chronic diastolic dysfunction   - appears euvolemic on exam     DM2 with CKD II and HTN  - 12/4 HgbA1c now 7.4%  - Home lantus 40 units BID (60 units BID with cytoxan infusions), novolog 20 units subq TID and liraglutide 1.8 mg subq daily, metformin 1000mg BID  - Continue lantus 24 units subq qhs  - continue Metformin 1 g  BID     Essential HTN  - Continue home atenolol 50 mg daily, diltiazem 240 mg daily, irbesartan 300 mg qhs  - prn hydralazine for sbp >180     CHASE Cirrhosis   - chronic and stable     DM II and Dyslipidemia:  - continue atorvastatin 40 mg daily     Hx of CVA  Left sided weakness  - continue asa 81 mg daily and atorvastatin     Chemotherapy induced Thrombocytopenia  - Attributed to cytoxan     Anemia of iron deficiency   Anemia associated with chemotherapy  - continue Iron sulfate 325 mg + ascorbic acid 250 mg qhs  - checking Stool occult     DJD of multiple sites   - PT/OT     Osteoporosis   - receiving vitamin D supplementation and on bisphosphonate therapy     Depression, chronic recurring    - continue sertraline 100 mg daily    Constipation  - initiated senna docusate 1 tab BID and MiraLax daily    Future Appointments   Date Time Provider Department Center   12/16/2019 10:00 AM Kay Troy MD [facility] RHEUM [facility]   12/19/2019  1:00 PM Edgar Nova MD [facility] FAM MED [place]   12/23/2019 11:00 AM RD VelazquezW [facility] SOCL [facility]   12/24/2019  9:30 AM LAB, HEMONC SAME DAY [facility]   12/24/2019 10:30 AM Lobito Goldstein MD [facility] BM YEH [facility]   12/24/2019 11:00 AM [facility], CHEMO [facility]   1/6/2020  1:30 PM GABRIEL Somers, CNS [facility] MSC [facility]   2/7/2020  1:00 PM DC, VISUAL FIELDS NOM OPHTHAL [facility]   2/7/2020  1:30 PM Lobito Willson MD [facility] OPHTHAL [facility]         I certify that SNF services are required to be given on an inpatient basis because Bessy Nunez needs for skilled nursing care and/or skilled rehabilitation are required on a daily basis and such services can only practically be provided in a skilled nursing facility setting and are for an ongoing condition for which she received inpatient care in the hospital.     Total time of the visit 66 minutes [id number]  Non physical exam/ non charting time: 47 minutes    Description of non physical exam/non charting time: counseling patient on clinical conditions and therapies provided regarding bowel regimen, hypertensive medications, diabetes management, and the beginning of discharge planning.  Extensive chart review completed including all consultation notes.  All pertinent laboratory and radiographical images reviewed.        Ebonie Gonsalves NP      DOS: 12/11/2019       Patient note was created using odal Dictation.  Any errors in syntax or even information may not have been identified and edited on initial review prior to signing this note.

## 2019-12-12 LAB
ANION GAP SERPL CALC-SCNC: 9 MMOL/L (ref 8–16)
BASOPHILS # BLD AUTO: 0.03 K/UL (ref 0–0.2)
BASOPHILS NFR BLD: 0.5 % (ref 0–1.9)
BUN SERPL-MCNC: 20 MG/DL (ref 8–23)
CALCIUM SERPL-MCNC: 9 MG/DL (ref 8.7–10.5)
CHLORIDE SERPL-SCNC: 109 MMOL/L (ref 95–110)
CO2 SERPL-SCNC: 21 MMOL/L (ref 23–29)
CREAT SERPL-MCNC: 1.1 MG/DL (ref 0.5–1.4)
DIFFERENTIAL METHOD: ABNORMAL
EOSINOPHIL # BLD AUTO: 0.2 K/UL (ref 0–0.5)
EOSINOPHIL NFR BLD: 3 % (ref 0–8)
ERYTHROCYTE [DISTWIDTH] IN BLOOD BY AUTOMATED COUNT: 14.4 % (ref 11.5–14.5)
EST. GFR  (AFRICAN AMERICAN): >60 ML/MIN/1.73 M^2
EST. GFR  (NON AFRICAN AMERICAN): >60 ML/MIN/1.73 M^2
GLUCOSE SERPL-MCNC: 98 MG/DL (ref 70–110)
HCT VFR BLD AUTO: 28.7 % (ref 40–54)
HGB BLD-MCNC: 9.4 G/DL (ref 14–18)
IMM GRANULOCYTES # BLD AUTO: 0.08 K/UL (ref 0–0.04)
IMM GRANULOCYTES NFR BLD AUTO: 1.3 % (ref 0–0.5)
LYMPHOCYTES # BLD AUTO: 1.3 K/UL (ref 1–4.8)
LYMPHOCYTES NFR BLD: 21.9 % (ref 18–48)
MAGNESIUM SERPL-MCNC: 1.5 MG/DL (ref 1.6–2.6)
MCH RBC QN AUTO: 28.7 PG (ref 27–31)
MCHC RBC AUTO-ENTMCNC: 32.8 G/DL (ref 32–36)
MCV RBC AUTO: 88 FL (ref 82–98)
MONOCYTES # BLD AUTO: 0.4 K/UL (ref 0.3–1)
MONOCYTES NFR BLD: 6.9 % (ref 4–15)
NEUTROPHILS # BLD AUTO: 3.9 K/UL (ref 1.8–7.7)
NEUTROPHILS NFR BLD: 66.4 % (ref 38–73)
NRBC BLD-RTO: 0 /100 WBC
PHOSPHATE SERPL-MCNC: 4.2 MG/DL (ref 2.7–4.5)
PLATELET # BLD AUTO: 184 K/UL (ref 150–350)
PMV BLD AUTO: 9.4 FL (ref 9.2–12.9)
POCT GLUCOSE: 116 MG/DL (ref 70–110)
POCT GLUCOSE: 122 MG/DL (ref 70–110)
POCT GLUCOSE: 175 MG/DL (ref 70–110)
POCT GLUCOSE: 93 MG/DL (ref 70–110)
POTASSIUM SERPL-SCNC: 3.5 MMOL/L (ref 3.5–5.1)
RBC # BLD AUTO: 3.27 M/UL (ref 4.6–6.2)
SODIUM SERPL-SCNC: 139 MMOL/L (ref 136–145)
WBC # BLD AUTO: 5.94 K/UL (ref 3.9–12.7)

## 2019-12-12 PROCEDURE — 97116 GAIT TRAINING THERAPY: CPT

## 2019-12-12 PROCEDURE — 80048 BASIC METABOLIC PNL TOTAL CA: CPT

## 2019-12-12 PROCEDURE — 83735 ASSAY OF MAGNESIUM: CPT

## 2019-12-12 PROCEDURE — 25000003 PHARM REV CODE 250: Performed by: NURSE PRACTITIONER

## 2019-12-12 PROCEDURE — 97535 SELF CARE MNGMENT TRAINING: CPT

## 2019-12-12 PROCEDURE — 25000003 PHARM REV CODE 250: Performed by: INTERNAL MEDICINE

## 2019-12-12 PROCEDURE — 97530 THERAPEUTIC ACTIVITIES: CPT

## 2019-12-12 PROCEDURE — 97110 THERAPEUTIC EXERCISES: CPT

## 2019-12-12 PROCEDURE — 84100 ASSAY OF PHOSPHORUS: CPT

## 2019-12-12 PROCEDURE — 85025 COMPLETE CBC W/AUTO DIFF WBC: CPT

## 2019-12-12 PROCEDURE — 36415 COLL VENOUS BLD VENIPUNCTURE: CPT

## 2019-12-12 PROCEDURE — 11000004 HC SNF PRIVATE

## 2019-12-12 RX ORDER — LANOLIN ALCOHOL/MO/W.PET/CERES
400 CREAM (GRAM) TOPICAL 2 TIMES DAILY
Status: COMPLETED | OUTPATIENT
Start: 2019-12-12 | End: 2019-12-14

## 2019-12-12 RX ORDER — POTASSIUM CHLORIDE 20 MEQ/1
40 TABLET, EXTENDED RELEASE ORAL ONCE
Status: COMPLETED | OUTPATIENT
Start: 2019-12-12 | End: 2019-12-12

## 2019-12-12 RX ADMIN — ASPIRIN 81 MG: 81 TABLET, DELAYED RELEASE ORAL at 10:12

## 2019-12-12 RX ADMIN — METHYLPHENIDATE HYDROCHLORIDE 10 MG: 10 TABLET ORAL at 05:12

## 2019-12-12 RX ADMIN — Medication 5000 UNITS: at 10:12

## 2019-12-12 RX ADMIN — DONEPEZIL HYDROCHLORIDE 5 MG: 5 TABLET, FILM COATED ORAL at 08:12

## 2019-12-12 RX ADMIN — IRBESARTAN 300 MG: 75 TABLET ORAL at 08:12

## 2019-12-12 RX ADMIN — Medication 250 MG: at 08:12

## 2019-12-12 RX ADMIN — FERROUS SULFATE TAB EC 325 MG (65 MG FE EQUIVALENT) 325 MG: 325 (65 FE) TABLET DELAYED RESPONSE at 08:12

## 2019-12-12 RX ADMIN — METHYLPHENIDATE HYDROCHLORIDE 10 MG: 10 TABLET ORAL at 10:12

## 2019-12-12 RX ADMIN — POTASSIUM CHLORIDE 40 MEQ: 1500 TABLET, EXTENDED RELEASE ORAL at 02:12

## 2019-12-12 RX ADMIN — ATORVASTATIN CALCIUM 40 MG: 20 TABLET, FILM COATED ORAL at 10:12

## 2019-12-12 RX ADMIN — Medication 400 MG: at 08:12

## 2019-12-12 RX ADMIN — DILTIAZEM HYDROCHLORIDE 240 MG: 120 CAPSULE, COATED, EXTENDED RELEASE ORAL at 10:12

## 2019-12-12 RX ADMIN — INSULIN ASPART 1 UNITS: 100 INJECTION, SOLUTION INTRAVENOUS; SUBCUTANEOUS at 10:12

## 2019-12-12 RX ADMIN — MICONAZOLE NITRATE: 20 POWDER TOPICAL at 08:12

## 2019-12-12 RX ADMIN — INSULIN DETEMIR 24 UNITS: 100 INJECTION, SOLUTION SUBCUTANEOUS at 10:12

## 2019-12-12 RX ADMIN — QUETIAPINE FUMARATE 25 MG: 25 TABLET ORAL at 08:12

## 2019-12-12 RX ADMIN — SERTRALINE HYDROCHLORIDE 150 MG: 50 TABLET ORAL at 10:12

## 2019-12-12 RX ADMIN — METFORMIN HYDROCHLORIDE 1000 MG: 500 TABLET, FILM COATED ORAL at 05:12

## 2019-12-12 RX ADMIN — METFORMIN HYDROCHLORIDE 1000 MG: 500 TABLET, FILM COATED ORAL at 10:12

## 2019-12-12 RX ADMIN — Medication 400 MG: at 11:12

## 2019-12-12 RX ADMIN — ATENOLOL 50 MG: 25 TABLET ORAL at 10:12

## 2019-12-12 NOTE — PLAN OF CARE
Problem: Physical Therapy Goal  Goal: Physical Therapy Goal  Description  Goals to be met by: 19     Patient will increase functional independence with mobility by performin. Supine to sit with Modified [place]  2. Sit to supine with Modified [place]  3. Sit to stand transfer with Modified [place] met 2019  4. Bed to chair transfer with Modified [place] w/ or w/o AD  5. Gait  x 150ft feet with Supervision using rollator   6. Gait x 100ft w/ SPV w/o AD  7. Ascend/Descend 4 inch curb step with Stand-by Assistance using rollator or RW  8. Stand for 5 minutes with Supervision w/ or w/o AD while performing activity met 2019  9. Pt will improve 5 times sit<>stand test time from 27.45s to 25s to decreased fall risk and improve functional mobility        Outcome: Ongoing, Progressing

## 2019-12-12 NOTE — PROGRESS NOTES
[facility]                                  Skilled Nursing Facility                   Progress Note     Admit Date: 12/9/2019  BELIA TBD  Principal Problem:  Sepsis due to urinary tract infection   HPI obtained from patient interview and chart review     Chief Complaint:Revaluation of medical treatment and therapy status: Lab review; hypokalemia; hypomagnesemia    HPI:   Mr. Nunez is 58 year old male with PMHx of primary CNS Vasculitis (on cytoxan monthly), DM2, and Hx of CVA who presents to SNF following a hospitalization for urosepsis with UTI.  Admission to SNF for secondary weakness and debility.    Interval History  All of today's labs reviewed and are listed below. K low at 3.7, Mg low at 1.5- asymptomatic.  H&H stable at 9.4/28.  24 hr vital sign ranges listed below.  111/66 to 163/84.  24 hr blood glucose range is .  Yesterday patient complained of constipation and had not had a bowel movement since 12/09 he was given MiraLax and senna docusate and had a successful bowel movement this morning.  Patient denies trouble sleeping at night, shortness of breath or cough, abdominal discomfort, nausea, or vomiting.  Patient reports an adequate appetite.  Patient denies dysuria. Patient progessing with PT/OT. Continuing to follow and treat all acute and chronic conditions.     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    Past Surgical History: Patient has a past surgical history that includes Skin cancer excision; Insertion of tunneled central venous catheter (CVC) with subcutaneous port (N/A, 12/7/2018); and Colonoscopy (N/A, 5/21/2019).    Social History: Patient reports that he has never smoked. He has never used smokeless tobacco. He reports that he does not drink alcohol or use drugs.    Family History: family history includes Cancer in his father; Cataracts in his mother; Diabetes in his maternal aunt; Heart disease in his mother; Heart failure in his mother; Hypertension in his mother; Rheum arthritis in his paternal grandmother; Stroke in his sister.    Allergies: Patient has No Known Allergies.    ROS  Constitutional: Negative for fever. + fatigue.   Eyes: Negative for blurred vision, double vision and discharge.   Respiratory: Negative for cough, shortness of breath and wheezing.    Cardiovascular: Negative for chest pain, palpitations, and leg swelling.   Gastrointestinal: Negative for abdominal pain, diarrhea, nausea and vomiting., constipation  Genitourinary: Negative for dysuria, frequency and urgency.   Musculoskeletal:  + generalized weakness. Negative for back pain and myalgias.   Skin: Negative for itching and rash.   Neurological: Negative for dizziness, speech change, and headaches.   Psychiatric/Behavioral: Negative for depression. The patient is not nervous/anxious.      PEx  Temp:  [97.7 °F (36.5 °C)-98.5 °F (36.9 °C)]   Pulse:  [64-74]   Resp:  [20]   BP: (111-163)/(66-84)   SpO2:  [96 %-97 %]      Constitutional: Patient appears well-developed and in no distress   HENT:   Head: Normocephalic and atraumatic.   Eyes: Pupils are equal, round  Neck: Normal range of motion. Neck supple.   Cardiovascular: Normal rate, regular rhythm and normal heart sounds.    Pulmonary/Chest: Effort normal and breath sounds are  clear  Abdominal:  Protuberant, Soft. Bowel sounds are normal.   Musculoskeletal: Normal range of motion.   Neurological: Alert and oriented to person, place, and time.  No focal weakness noted  Psychiatric:  Flat affect. Behavior is normal.   Skin: Skin is warm and dry. Moisture associated dermatitis noted to groin area    Recent Labs   Lab 12/12/19  0545      K 3.5      CO2 21*   BUN 20   CREATININE 1.1   MG 1.5*       Recent Labs   Lab 12/12/19  0545   WBC 5.94   RBC 3.27*   HGB 9.4*   HCT 28.7*      MCV 88   MCH 28.7   MCHC 32.8         Assessment and Plan:         Problems addressed today      Hypomagnesemia  - initiated magnesium oxide 400 mg BID x 3 days    Hypokalemia  - initiated potassium 40 mEq x1 dose    Constipation  - resolved, continue senna docusate 1 tab BID and MiraLax daily    DM2 with CKD II and HTN  - 12/4 HgbA1c now 7.4%  - Home lantus 40 units BID (60 units BID with cytoxan infusions), novolog 20 units subq TID and liraglutide 1.8 mg subq daily, metformin 1000mg BID  - Continue lantus 24 units subq qhs  - continue Metformin 1 g BID  - 12/12 stable, continue current treatment    Essential HTN  - Continue home atenolol 50 mg daily, diltiazem 240 mg daily, irbesartan 300 mg qhs  - prn hydralazine for sbp >180  - 12/12 stable, continue current treatment    Septic Encephalopathy  Acute metabolic encephalopathy  - neuro status is currently at baseline    Chronic diastolic dysfunction   - appears euvolemic on exam         Ongoing but stable problems        Severe Sepsis 2/2 UTI  Complicated E. Coli UTI  - resolved, Completed course of antibiotics; no current symptoms     CNS Vasculitis  dementia with behavioral disturbance  - Receives cytoxan monthly  - Continue meds donepezil 5 mg qhs, sertraline 100 mg daily, quetiapine 25 mg qhs, and methylphenidate 10 mg BID  - continue Vitamin D 5000 unit daily  - continue Speech therapy evaluation     Candidal Intertrigo  - Rash noted in  folds of groin, continue 2% topical miconazole     CHASE Cirrhosis   - chronic and stable     DM II and Dyslipidemia:  - continue atorvastatin 40 mg daily     Hx of CVA  Left sided weakness  - continue asa 81 mg daily and atorvastatin     Chemotherapy induced Thrombocytopenia  - Attributed to cytoxan     Anemia of iron deficiency   Anemia associated with chemotherapy  - continue Iron sulfate 325 mg + ascorbic acid 250 mg qhs  - checking Stool occult     DJD of multiple sites   - PT/OT     Osteoporosis   - receiving vitamin D supplementation and on bisphosphonate therapy     Depression, chronic recurring    - continue sertraline 100 mg daily      Future Appointments   Date Time Provider Department Center   12/16/2019 10:00 AM Kay Troy MD [facility] RHEUM [facility]   12/19/2019  1:00 PM Edgar Nova MD [facility] MED [place]   12/23/2019 11:00 AM RD VelazquezW [facility] SOCL WK [facility]   12/24/2019  9:30 AM LAB, HEMONC SAME DAY [facility] LAB [facility]   12/24/2019 10:30 AM Lobito Goldstein MD [facility] BM YEH [facility]   12/24/2019 11:00 AM [facility], CHEMO [facility] CHE[facility]   1/6/2020  1:30 PM GABRIEL Somers, CNS [facility] MSC [facility]   2/7/2020  1:00 PM DC, VISUAL FIELDS NOM OPHTHAL [facility]   2/7/2020  1:30 PM Lobito Willson MD [facility] OPHTHAL [facility]       Ebonie Gonsalves NP      DOS: 12/12/2019       Patient note was created using MModal Dictation.  Any errors in syntax or even information may not have been identified and edited on initial review prior to signing this note.

## 2019-12-12 NOTE — PLAN OF CARE
Problem: Adult Inpatient Plan of Care  Goal: Plan of Care Review  Outcome: Ongoing, Progressing     Problem: Diabetes Comorbidity  Goal: Blood Glucose Level Within Desired Range  Outcome: Ongoing, Progressing     Problem: Fall Injury Risk  Goal: Absence of Fall and Fall-Related Injury  Outcome: Ongoing, Progressing

## 2019-12-12 NOTE — CONSULTS
"  [facility] - Skilled Nursing Care  Adult Nutrition  Consult Note    SUMMARY   Recommendations  Recommendation/Intervention: Continue diabetic diet, recommend double meats, RD following  Goals: PO to meet 85% of EEN by next RD fu  Nutrition Goal Status: new  Communication of RD Recs: other (comment)(POC)    Reason for Assessment    Reason For Assessment: consult  Diagnosis: (Sepsis 2/2 UTI)  Relevant Medical History: s/p encephalopathy, obesity,DM2, CVA, HTN, NAFLD, HF, CNS vasculitiis, depression, CHASE, CKD 2, anemia  Interdisciplinary Rounds: attended  General Information Comments: pt would sleep all day per spouse, no chewing or swallowing difficulty, eats 3 meals and 3 snacks daily, glucose is tested 4 times per day per spouse, NFPE completed pt was not very cooperative under the covers , so incomplete exam, pt complaining he did not get enough to eat for dinner, explained snack options on floor appropriate for diabetics, pt agrees to double meats,   Nutrition Discharge Planning: DC on diabetic diet    Nutrition Risk Screen    Nutrition Risk Screen: no indicators present    Nutrition/Diet History    Patient Reported Diet/Restrictions/Preferences: diabetic diet  Typical Food/Fluid Intake: see above summary  Food Preferences: no milk unless on cereal  Spiritual, Cultural Beliefs, Scientology Practices, Values that Affect Care: no  Food Allergies: NKFA  Factors Affecting Nutritional Intake: None identified at this time    Anthropometrics    Temp: 98.5 °F (36.9 °C)  Height Method: Stated  Height: 5' 10.5" (179.1 cm)  Height (inches): 70.5 in  Weight Method: Standard Scale  Weight: 99 kg (218 lb 4.1 oz)  Weight (lb): 218.26 lb  Ideal Body Weight (IBW), Male: 169 lb  % Ideal Body Weight, Male (lb): 129.15 lb  BMI (Calculated): 30.9  BMI Grade: 30 - 34.9- obesity - grade I       Lab/Procedures/Meds    Pertinent Labs Reviewed: reviewed  Pertinent Labs Comments: glucose 96, HgA1c 7.4, Hct 27.8, Hg 9.1,  Pertinent " Medications Reviewed: reviewed  Pertinent Medications Comments: ASA, Vit C, Fe, Vit D, senna-docusate, polyethylene gycol, statin, insulin, metformin,     Estimated/Assessed Needs    Weight Used For Calorie Calculations: 99 kg (218 lb 4.1 oz)  Energy Calorie Requirements (kcal): 1809 2/2 obesity  Energy Need Method: Jasper-St Jeor(x 1.0 (PAL) 2/2 obesity)  Protein Requirements: 100g  Weight Used For Protein Calculations: 76.8 kg (169 lb 5 oz)(IBW kg x 1.3)  Fluid Requirements (mL): per MD or 1ml/kcal  Estimated Fluid Requirement Method: RDA Method  RDA Method (mL): 29485  CHO Requirement: 225gm per day      Nutrition Prescription Ordered    Current Diet Order: 2000 diabetic  Nutrition Order Comments: %  Oral Nutrition Supplement: none    Evaluation of Received Nutrient/Fluid Intake    Energy Calories Required: meeting needs  Protein Required: not meeting needs  Fluid Required: meeting needs  Comments: recommend double meats  Tolerance: tolerating  % Intake of Estimated Energy Needs: 75 - 100 %  % Meal Intake: 75 - 100 %    Nutrition Risk    Level of Risk/Frequency of Follow-up: low     Assessment and Plan    Obesity (BMI 30-39.9)  Contributing Nutrition Diagnosis  Obesity related to excessive calorie consumption    Related to (etiology):   Inactivity, snacking, depression    Signs and Symptoms (as evidenced by):   BMI > 30, pt asking for more food, diet hx reveals 3 meals and 3 snacks daily with improved glucose control    Interventions/Recommendations (treatment strategy):  Carbohydrate modified diet- 2000 calories  Protein modified diet- double meats     Nutrition Diagnosis Status:   New           Monitor and Evaluation    Food and Nutrient Intake: food and beverage intake  Food and Nutrient Adminstration: diet order  Anthropometric Measurements: weight change  Biochemical Data, Medical Tests and Procedures: glucose/endocrine profile, electrolyte and renal panel, inflammatory profile  Nutrition-Focused  Physical Findings: overall appearance     Malnutrition Assessment 12/11/19 limited                 Orbital Region (Subcutaneous Fat Loss): well nourished  Upper Arm Region (Subcutaneous Fat Loss): mild depletion  Thoracic and Lumbar Region: well nourished   Orthodox Region (Muscle Loss): mild depletion  Dorsal Hand (Muscle Loss): moderate depletion  Anterior Thigh Region (Muscle Loss): moderate depletion   Edema (Fluid Accumulation): 0-->no edema present             Nutrition Follow-Up    RD Follow-up?: Yes

## 2019-12-12 NOTE — ASSESSMENT & PLAN NOTE
Contributing Nutrition Diagnosis  Obesity related to excessive calorie consumption    Related to (etiology):   Inactivity, snacking, depression    Signs and Symptoms (as evidenced by):   BMI > 30, pt asking for more food, diet hx reveals 3 meals and 3 snacks daily with improved glucose control    Interventions/Recommendations (treatment strategy):  Carbohydrate modified diet- 2000 calories  Protein modified diet- double meats     Nutrition Diagnosis Status:   New

## 2019-12-12 NOTE — PT/OT/SLP PROGRESS
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  Pt. Aware he is soiled but reported he did not call for assist to be cleaned    Pain/Comfort  Pain Rating 1: 0/10  Pain Rating Post-Intervention 1: 0/10    Objective:  Patient found with: (right sidelying in bed (soiled))    Occupational Performance:    Bed Mobility:    · Patient completed Supine to Sit with supervision     Functional Mobility/Transfers:  · Patient completed Sit <> Stand Transfer with stand by assistance  with  no assistive device   · Patient completed Bed <> Chair Transfer using Stand Pivot technique with stand by assistance with no assistive device  · Patient completed Toilet Transfer Stand Pivot technique with stand by assistance with  grab bars  · Functional Mobility: Pt. Ambulated in room with RW and SBA; pt. Ambulated in room without an AD and CGA/HHA    Activities of Daily Living:  · Grooming: stand by assistance seated at sink  · Bathing: contact guard assistance to shower seated on seat for standing portion when cleaning buttocks; vc's to clean all aspects of body  · Upper Body Dressing: stand by assistance to don pull over shirt seated  · Lower Body Dressing: stand by assistance to don pants and socks when standing to manage pants over hips in stand  · Toileting: dependence pt. soiled on OT arrival; able to assist with cleaning and clothing management with vc's    [facility] 6 Click:  [facility] Total Score: 16    OT Exercises: AROM with 2 # dowel for all major planes of BUE motion x 2 sets 10 reps with the exception of elbow  flex/ext that pt. Performed with 4 # dowel x 2 sets 10 reps  .   UE Ergometer  X 12 minutes with Mod resistance    Additional Treatment:  Pt. Educated on safety with self-care skills as well as safety with showers and transfers    Patient left up in chair with nurse notified and BLE elevated    ASSESSMENT:  Bessy Nunez is a 59 y.o. male with a medical diagnosis of Sepsis due to urinary tract infection and continues to appear to be confused at [facility]; pt. Was at a SBA level for safety with ADL task completion on this date as well as transfers and ambulation.    Rehab identified problem list/impairments: impaired endurance, impaired functional mobilty, impaired self care skills, gait instability, impaired balance, edema, decreased safety awareness    Rehab potential is fair    Activity tolerance: Good    Discharge recommendations: home health OT(with S)     Barriers to discharge: None     Equipment recommendations: bedside commode, walker, rolling     GOALS:   Multidisciplinary Problems     Occupational Therapy Goals        Problem: Occupational Therapy Goal    Goal Priority Disciplines Outcome Interventions   Occupational Therapy Goal     OT, PT/OT     Description:  Goals to be met by: 12-18-19     Patient will increase functional independence with ADLs by performing:    UE Dressing with Set-up Assistance.Met 12/11/19  LE Dressing with Supervision  Grooming while standing with Supervision.  Toileting from toilet with Supervision for hygiene and clothing management.   Bathing from  shower chair/bench with Stand-by Assistance.  Supine to sit with Modified [place].  Stand pivot transfers with Supervision with RW  Toilet transfer to toilet with Supervision with RW  Pt. To engage in standing task x 8 minutes with S  Pt. To identify 3 leisure activities to engage in weekly   Pt. To be I with HEP to improve level of endurance                       Plan:  Patient to be seen 5 x/week to address the above listed problems  via self-care/home management, therapeutic activities, therapeutic exercises  Plan of Care expires: 01/09/20  Plan of Care reviewed with: patient    COLTON Lehman  12/12/2019

## 2019-12-12 NOTE — CLINICAL REVIEW
Clinical Pharmacy Chart Review Note      Admit Date: 12/9/2019   LOS: 3 days       Bessy Nunez is a 59 y.o. male admitted to SNF for PT/OT after hospitalization for sepsis due to urinary tract infection.    Active Hospital Problems    Diagnosis  POA    *Sepsis due to urinary tract infection [A41.9, N39.0]  Yes    Severe sepsis [A41.9, R65.20]  Yes    Anemia associated with chemotherapy [D64.81, T45.1X5A]  Yes    Chemotherapy-induced thrombocytopenia [D69.59, T45.1X5A]  Yes    MANISH (acute kidney injury) [N17.9]  Yes    Dementia with behavioral disturbance [F03.91]  Yes    Osteoporosis with current pathological fracture with routine healing from steroids; compression fx 2017; 4/2019 bisphosphonate [M80.00XD]  Not Applicable    CNS vasculitis failed imuran; on cytoxan [I77.6]  Yes     Failed Cytoxan hiatus and transition to Imuran Sept/Oct 2018;   Now much improved back on Cytoxan;   Refer to Dr. Daley of rheumatology for second opinion on management      Encephalopathy [G93.40]  Yes    Type 2 diabetes mellitus with diabetic polyneuropathy, with long-term current use of insulin [E11.42, Z79.4]  Not Applicable    NAFLD (nonalcoholic fatty liver disease) 6/12/2015 liver biopsy showing advanced stage fibrosis with bridging fibrosis and scattered nodules. [K76.0]  Yes     6/12/2015 liver biopsy showing advanced stage fibrosis with bridging fibrosis and scattered nodules.      Obesity (BMI 30-39.9) [E66.9]  Yes    Mixed hyperlipidemia [E78.2]  Yes    Essential hypertension 5/2016 TTE diastolic dysfunction [I10]  Yes     5/11/2016 TTE:   1 - Mildly depressed left ventricular systolic function (EF 45-50%).  Mild global hypokinesis at rest. 2 - Eccentric hypertrophy. 3 - Left ventricular diastolic dysfunction.        Resolved Hospital Problems   No resolved problems to display.     Review of patient's allergies indicates:  No Known Allergies  Patient Active Problem List    Diagnosis Date Noted    Sepsis due to  urinary tract infection 12/09/2019    Severe sepsis 12/03/2019    Stress 11/04/2019    Diarrhea 07/18/2019    Anemia associated with chemotherapy 07/18/2019    Chemotherapy-induced thrombocytopenia 07/18/2019    MANISH (acute kidney injury) 06/21/2019    Screen for colon cancer 05/21/2019    Dementia with behavioral disturbance 04/30/2019    Osteoporosis with current pathological fracture with routine healing from steroids; compression fx 2017; 4/2019 bisphosphonate 04/10/2019    Duodenal ulcer 6/2017 EGD - duodenum and gastric ulcer s/p cautery 03/28/2019    Renal cyst bilateral simple and minimally, complicated renal cysts. 03/28/2019   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    Central scotoma, right eye 02/06/2019    Encounter for chemotherapy management 12/07/2018    Encounter for long term current use of cyclophosphamide 10/30/2018    Need for Tdap vaccination 08/29/2018    Need for hepatitis A and B vaccination 08/29/2018    Need for pneumococcal vaccination 08/29/2018    Acquired immunocompromised state 08/29/2018    Controlled type 2 diabetes mellitus with diabetic nephropathy, with long-term current use of insulin 08/01/2018    Chemotherapy induced nausea and vomiting 06/12/2018    Dysphasia     Aphasia 12/04/2017    Dysphonia 12/04/2017    Cognitive deficits 12/04/2017    Closed compression fracture of L4 lumbar vertebra on MRI 11/2017 11/15/2017    Adverse effect of glucocorticoids and synthetic analogues, initial encounter 09/15/2017    Lacunar stroke of left subthalamic region 09/14/2017    Episodic confusion     Cytopenia 08/30/2017    Impaired functional mobility, balance, gait, and endurance 06/14/2017    Urinary incontinence 06/04/2017    CNS vasculitis failed imuran; on cytoxan 06/02/2017    Encephalopathy 05/26/2017    Type 2 diabetes mellitus with diabetic polyneuropathy, with long-term current use of insulin 05/14/2017     Amblyopia 05/13/2017    Tubular adenoma of colon 2014 C scope polyp repeat 3 years; 5/21/2019 colonscopy 8 ascending 4 descending polyps path 2 tubular adenomas, rest are inflammatory polyps; repeat 1 year 01/30/2015    Lipodystrophy 10/11/2013    NAFLD (nonalcoholic fatty liver disease) 6/12/2015 liver biopsy showing advanced stage fibrosis with bridging fibrosis and scattered nodules. 10/11/2013    Obesity (BMI 30-39.9) 10/11/2013    ED (erectile dysfunction) 10/11/2013     JOHN with CPAP at night 10/11/2013    Episode of recurrent major depressive disorder 11/09/2012    Mixed hyperlipidemia 11/09/2012    Essential hypertension 5/2016 TTE diastolic dysfunction 11/09/2012       Scheduled Meds:    ascorbic acid (vitamin C)  250 mg Oral Q48H    aspirin  81 mg Oral Daily    atenolol  50 mg Oral Daily    atorvastatin  40 mg Oral Daily    diltiaZEM  240 mg Oral Daily    donepezil  5 mg Oral QHS    ferrous sulfate  325 mg Oral Q48H    insulin detemir U-100  24 Units Subcutaneous Daily    irbesartan  300 mg Oral QHS    metFORMIN  1,000 mg Oral BID WM    methylphenidate HCl  10 mg Oral BID WM    miconazole NITRATE 2 %   Topical (Top) BID    polyethylene glycol  17 g Oral Daily    QUEtiapine  25 mg Oral QHS    senna-docusate 8.6-50 mg  1 tablet Oral BID    sertraline  150 mg Oral Daily    vitamin D  5,000 Units Oral Daily     Continuous Infusions:   PRN Meds: acetaminophen, albuterol-ipratropium, calcium carbonate, dextrose 50 % in water (D50W), dextrose 50 % in water (D50W), glucagon (human recombinant), glucose, glucose, insulin aspart U-100, melatonin    OBJECTIVE:     Vital Signs (Last 24H)  Temp:  [97.7 °F (36.5 °C)-98.5 °F (36.9 °C)]   Pulse:  [64-74]   Resp:  [20]   BP: (111-163)/(66-84)   SpO2:  [96 %-97 %]     Laboratory:  CBC:   Recent Labs   Lab 12/08/19  0415 12/09/19  0412 12/12/19  0545   WBC 3.76* 4.95 5.94   RBC 3.16* 3.16* 3.27*   HGB 9.2* 9.1* 9.4*   HCT 27.7* 27.8* 28.7*   PLT  "103* 134* 184   MCV 88 88 88   MCH 29.1 28.8 28.7   MCHC 33.2 32.7 32.8     BMP:   Recent Labs   Lab 12/08/19 0415 12/09/19 0412 12/12/19  0545   * 129* 98    142 139   K 3.5 3.6 3.5   * 112* 109   CO2 21* 22* 21*   BUN 22 19 20   CREATININE 1.2 1.0 1.1   CALCIUM 9.4 8.8 9.0   MG  --   --  1.5*     CMP:   Recent Labs   Lab 12/08/19 0415 12/09/19 0412 12/12/19  0545   * 129* 98   CALCIUM 9.4 8.8 9.0    142 139   K 3.5 3.6 3.5   CO2 21* 22* 21*   * 112* 109   BUN 22 19 20   CREATININE 1.2 1.0 1.1     LFTs: No results for input(s): ALT, AST, ALKPHOS, BILITOT, PROT, ALBUMIN in the last 168 hours.  Coagulation:   Recent Labs   Lab 12/07/19  0457   INR 1.0     Lab Results   Component Value Date    HGBA1C 7.4 (H) 12/04/2019         ASSESSMENT/PLAN:     I have reviewed the medications in compliance with CMS Regulation F756 of the [employer]. Based on information gathered, the following items may need to be addressed:    **Patient is taking quetiapine and sertraline (home meds) for dementia with behavioral disturbances and major depression. A gradual dose reduction is not recommended at this time. Patient to follow-up with prescribing MD as outpatient.  Monitor:   Mental status for psychosis, delirium, depression, suicide ideation, anxiety, serotonin syndrome, hyponatremia, dizziness, drowsiness, orthostatic hypotension, hyperglycemia, hyperlipidemia, weight gain  Monitor:   "Extrapyramidal symptoms (EPS)" such as:  - Akathisia: a distressing feeling of internal restlessness that may appear as constant motion, the inability to sit still, fidgeting, pacing, or rocking.   - Medication-induced Parkinsonism: including tremors, shuffling gait, slowness of movement, expressionless face, drooling, postural unsteadiness and rigidity of muscles in the limbs, neck and trunk.   - Dystonia: acute, painful, spastic contraction of muscle groups (commonly the neck, eyes and trunk) that often occurs soon " "after initiating treatment and is more common in younger individuals   "Tardive dyskinesia"   - abnormal, recurrent, involuntary movements that may be irreversible and typically present as lateral movements of the tongue or jaw, tongue thrusting, chewing, frequent blinking, brow arching, grimacing, and lip smacking, although the trunk or other parts of the body may also be affected   "Neuroleptic Malignant Syndrome (NMS)"  - typically presents with a sudden onset of diffuse muscle rigidity, high fever, labile blood pressure, tremor, and notable cognitive dysfunction. It is potentially fatal if not treated immediately, including stopping the offending medications     Medications reviewed by PharmD, please re-consult if needed.      Michelle Ceron, Pharm. D.  Clinical Pharmacist  [facility]    "

## 2019-12-12 NOTE — PLAN OF CARE
Problem: Adult Inpatient Plan of Care  Goal: Plan of Care Review  Outcome: Ongoing, Progressing   Obesity (BMI 30-39.9)  Contributing Nutrition Diagnosis  Obesity related to excessive calorie consumption     Related to (etiology):   Inactivity, snacking, depression     Signs and Symptoms (as evidenced by):   BMI > 30, pt asking for more food, diet hx reveals 3 meals and 3 snacks daily with improved glucose control     Interventions/Recommendations (treatment strategy):  Carbohydrate modified diet- 2000 calories  Protein modified diet- double meats      Nutrition Diagnosis Status:   New

## 2019-12-12 NOTE — PT/OT/SLP PROGRESS
"Physical Therapy  Treatment    Bessy Nunez   MRN: [id number]   Admitting Diagnosis: Sepsis due to urinary tract infection    PT Received On: 12/12/19          Billable Minutes:  Gait Training 20, Therapeutic Activity 10 and Therapeutic Exercise 15    Treatment Type: Treatment  PT/PTA: PTA     PTA Visit Number: 3       General Precautions: Standard, aspiration, fall, diabetic  Orthopedic Precautions: N/A   Braces: N/A    Spiritual, Cultural Beliefs, Holiness Practices, Values that Affect Care: no    Subjective:  "I'm good" Pt agreebale to therapy    Pain/Comfort  Pain Rating 1: 0/10  Pain Rating Post-Intervention 1: 0/10    Objective:  Patient found in wc in gym       AM-PAC 6 CLICK MOBILITY  Total Score:19    Bed Mobility:  Sit>Supine: Mod I    Transfers:  Sit<>Stand: t/f wc, t/f recumbent stepper, to bed with rollator, and without AD Mod I  Stand Pivot Transfer: recumbent stepper>wc arm rest for support Mod I, wc>bed no AD Mod I    Gait:  Amb 2 x 120' rollator SBA with vcs for decreasing speed for safety, 1 x 120' no AD CGA mild lateral instability on turns      Advanced Gait:  Curb Step: 4" x 2 curb CGA vcs for tech    Wheelchair Mobility:  Patient propels w/c 200' BUE Mod I     Therex:  Recumbent stepper 15 minutes L 2    Balance:  No LOB noted throughout session    Additional Treatment:  Patient educated on importance of increased time out of bed and JEROMY throughout the day    Patient left HOB elevated with call button in reach.    Assessment:  Bessy Nunez is a 59 y.o. male with a medical diagnosis of Sepsis due to urinary tract infection.  Patient tolerated treatment well focusing on bed mobility, transfers, gait and therex. Patient will continue to improve with skilled physical therapy services in order to return to functional baseline.    Rehab identified problem list/impairments: impaired self care skills, impaired functional mobilty, gait instability, impaired balance    Rehab potential is " good.    Activity tolerance: Good    Discharge recommendations: home with home health(w/ SPV from wife)     Barriers to discharge: None    Equipment recommendations: bedside commode     GOALS:   Multidisciplinary Problems     Physical Therapy Goals        Problem: Physical Therapy Goal    Goal Priority Disciplines Outcome Goal Variances Interventions   Physical Therapy Goal     PT, PT/OT Ongoing, Progressing     Description:  Goals to be met by: 19     Patient will increase functional independence with mobility by performin. Supine to sit with Modified [place]  2. Sit to supine with Modified [place]  3. Sit to stand transfer with Modified [place] met 2019  4. Bed to chair transfer with Modified [place] w/ or w/o AD  5. Gait  x 150ft feet with Supervision using rollator   6. Gait x 100ft w/ SPV w/o AD  7. Ascend/Descend 4 inch curb step with Stand-by Assistance using rollator or RW  8. Stand for 5 minutes with Supervision w/ or w/o AD while performing activity met 2019  9. Pt will improve 5 times sit<>stand test time from 27.45s to 25s to decreased fall risk and improve functional mobility                         PLAN:    Patient to be seen 5 x/week  to address the above listed problems via gait training, therapeutic activities, therapeutic exercises  Plan of Care expires: 20  Plan of Care reviewed with: patient    Loren Bear, PTA  2019

## 2019-12-13 LAB
POCT GLUCOSE: 110 MG/DL (ref 70–110)
POCT GLUCOSE: 278 MG/DL (ref 70–110)
POCT GLUCOSE: 92 MG/DL (ref 70–110)
POCT GLUCOSE: 97 MG/DL (ref 70–110)

## 2019-12-13 PROCEDURE — 11000004 HC SNF PRIVATE

## 2019-12-13 PROCEDURE — 97110 THERAPEUTIC EXERCISES: CPT

## 2019-12-13 PROCEDURE — 25000003 PHARM REV CODE 250: Performed by: NURSE PRACTITIONER

## 2019-12-13 PROCEDURE — 25000003 PHARM REV CODE 250: Performed by: INTERNAL MEDICINE

## 2019-12-13 PROCEDURE — 97530 THERAPEUTIC ACTIVITIES: CPT

## 2019-12-13 PROCEDURE — 97535 SELF CARE MNGMENT TRAINING: CPT

## 2019-12-13 PROCEDURE — 97116 GAIT TRAINING THERAPY: CPT

## 2019-12-13 PROCEDURE — 92507 TX SP LANG VOICE COMM INDIV: CPT

## 2019-12-13 RX ADMIN — DONEPEZIL HYDROCHLORIDE 5 MG: 5 TABLET, FILM COATED ORAL at 09:12

## 2019-12-13 RX ADMIN — METHYLPHENIDATE HYDROCHLORIDE 10 MG: 10 TABLET ORAL at 10:12

## 2019-12-13 RX ADMIN — ACETAMINOPHEN 650 MG: 325 TABLET ORAL at 04:12

## 2019-12-13 RX ADMIN — SENNOSIDES AND DOCUSATE SODIUM 1 TABLET: 8.6; 5 TABLET ORAL at 09:12

## 2019-12-13 RX ADMIN — IRBESARTAN 300 MG: 75 TABLET ORAL at 10:12

## 2019-12-13 RX ADMIN — ATENOLOL 50 MG: 25 TABLET ORAL at 10:12

## 2019-12-13 RX ADMIN — METFORMIN HYDROCHLORIDE 1000 MG: 500 TABLET, FILM COATED ORAL at 06:12

## 2019-12-13 RX ADMIN — MICONAZOLE NITRATE: 20 POWDER TOPICAL at 10:12

## 2019-12-13 RX ADMIN — Medication 5000 UNITS: at 10:12

## 2019-12-13 RX ADMIN — Medication 400 MG: at 09:12

## 2019-12-13 RX ADMIN — INSULIN ASPART 6 UNITS: 100 INJECTION, SOLUTION INTRAVENOUS; SUBCUTANEOUS at 12:12

## 2019-12-13 RX ADMIN — METFORMIN HYDROCHLORIDE 1000 MG: 500 TABLET, FILM COATED ORAL at 10:12

## 2019-12-13 RX ADMIN — INSULIN DETEMIR 24 UNITS: 100 INJECTION, SOLUTION SUBCUTANEOUS at 10:12

## 2019-12-13 RX ADMIN — ATORVASTATIN CALCIUM 40 MG: 20 TABLET, FILM COATED ORAL at 10:12

## 2019-12-13 RX ADMIN — SERTRALINE HYDROCHLORIDE 150 MG: 50 TABLET ORAL at 10:12

## 2019-12-13 RX ADMIN — QUETIAPINE FUMARATE 25 MG: 25 TABLET ORAL at 09:12

## 2019-12-13 RX ADMIN — METHYLPHENIDATE HYDROCHLORIDE 10 MG: 10 TABLET ORAL at 06:12

## 2019-12-13 RX ADMIN — DILTIAZEM HYDROCHLORIDE 240 MG: 120 CAPSULE, COATED, EXTENDED RELEASE ORAL at 10:12

## 2019-12-13 RX ADMIN — ASPIRIN 81 MG: 81 TABLET, DELAYED RELEASE ORAL at 10:12

## 2019-12-13 RX ADMIN — Medication 400 MG: at 10:12

## 2019-12-13 NOTE — PT/OT/SLP PROGRESS
"Physical Therapy  Treatment    Bessy Nunez   MRN: [id number]   Admitting Diagnosis: Sepsis due to urinary tract infection    PT Received On: 12/13/19          Billable Minutes:  Gait Training 15, Therapeutic Activity 15 and Therapeutic Exercise 15    Treatment Type: Treatment  PT/PTA: PTA     PTA Visit Number: 4       General Precautions: Standard, aspiration, fall, diabetic  Orthopedic Precautions: N/A   Braces: N/A    Spiritual, Cultural Beliefs, Jewish Practices, Values that Affect Care: no    Subjective:  "I'm good" Pt agreebale to therapy    Pain/Comfort  Pain Rating 1: 0/10  Pain Rating Post-Intervention 1: 0/10    Objective:  Patient found in wc in gym        AM-PAC 6 CLICK MOBILITY  Total Score:21    Transfers:  Sit<>Stand: t/f wc, t/f Mod I rollator and without AD    Gait:  Amb 2 x 120 rollator S focus on decreasing speed and heel strike, demo'd improvement throughout trial, 1 x 120 no AD SBA mild lateral instability    Advanced Gait:  Curb Step: 4" Rollator 2 trials SB/CGA, vcs for tech on first trial    Wheelchair Mobility:  Patient propels w/c Mod I on and off unit     Therex:  2 x 10 AP, LAQ, HF, abd/add, GS    Balance:  No LOB noted throughout session    Additional Treatment:  Five Time Sit<>stand test= 42.74s indicating that pt is at risk for falls  Patient educated on importance of increased time out of bed and JEROMY throughout the day    Patient left up in chair with call button in reach.    Assessment:  Bessy Nunez is a 59 y.o. male with a medical diagnosis of Sepsis due to urinary tract infection.  Patient tolerated treatment well focusing on transfers, gait and therex. Patient will continue to improve with skilled physical therapy services in order to return to functional baseline.    Rehab identified problem list/impairments: impaired self care skills, impaired functional mobilty, gait instability, impaired balance    Rehab potential is good.    Activity tolerance: Good    Discharge " recommendations: home with home health(w/ SPV from wife)     Barriers to discharge: None    Equipment recommendations: bedside commode     GOALS:   Multidisciplinary Problems     Physical Therapy Goals        Problem: Physical Therapy Goal    Goal Priority Disciplines Outcome Goal Variances Interventions   Physical Therapy Goal     PT, PT/OT Ongoing, Progressing     Description:  Goals to be met by: 19     Patient will increase functional independence with mobility by performin. Supine to sit with Modified [place]  2. Sit to supine with Modified [place]  3. Sit to stand transfer with Modified [place] met 2019  4. Bed to chair transfer with Modified [place] w/ or w/o AD  5. Gait  x 150ft feet with Supervision using rollator met 2019  6. Gait x 100ft w/ SPV w/o AD  7. Ascend/Descend 4 inch curb step with Stand-by Assistance using rollator or RW  8. Stand for 5 minutes with Supervision w/ or w/o AD while performing activity met 2019  9. Pt will improve 5 times sit<>stand test time from 27.45s to 25s to decreased fall risk and improve functional mobility                          PLAN:    Patient to be seen 5 x/week  to address the above listed problems via gait training, therapeutic activities, therapeutic exercises  Plan of Care expires: 20  Plan of Care reviewed with: patient    Loren Bear, PTA  2019

## 2019-12-13 NOTE — PT/OT/SLP PROGRESS
Occupational Therapy  Treatment    Bessy Nunez   MRN: [id number]   Admitting Diagnosis: Sepsis due to urinary tract infection    OT Date of Treatment: 12/13/19       Billable Minutes:45  Therapeutic Activity 25 and Therapeutic Exercise 20    General Precautions: Standard, fall, diabetic(blind in right eye)  Orthopedic Precautions: N/A  Braces: N/A    Spiritual, Cultural Beliefs, Anglican Practices, Values that Affect Care: no    Subjective:  Communicated with nsg prior to session.  I am doing well today    Pain/Comfort  Pain Rating 1: 0/10  Pain Rating Post-Intervention 1: 0/10    Objective:   Pt. Supine on arrival with wife present     Occupational Performance:    Bed Mobility:    · Patient completed Supine to Sit with supervision  · Patient completed Sit to Supine with supervision     Functional Mobility/Transfers:  · Patient completed Sit <> Stand Transfer with contact guard assistance  with  no assistive device   · Patient completed Bed <> Chair Transfer using Stand Pivot technique with contact guard assistance with no assistive device      Activities of Daily Living:  · Not tested     [facility] 6 Click:  [facility] Total Score: 16    OT Exercises: UE Ergometer 10 min    Additional Treatment:  Pt. With standing act on this day with task. Pt. With CGA/SBA for balance aspects with task with  AD at raised counter Pt with visual perception task with discrimination of various shapes and sizes x 15  min with standing bal and min cues through out with weight shifting and use of BUE's incorporated and crossing mid line and facilitation with posture in prep for home management .   Pt. With 2# dowel activity with 2x10 reps with  shd flex, bicep curls horz adb/add and forward flex motion to increase BUE ROM and strength,.   Pt. With standing and therex performed to increase ROM, endurance selfcare task and fxl mobility for independence     Patient left supine with all lines intact, call button in reach and spouse  present    ASSESSMENT:  Bessy Nunez is a 59 y.o. male with a medical diagnosis of Sepsis due to urinary tract infection Pt. participated well with session on this day.Pt demos physical deficits with balance  functional mobility, UB strength, endurance  level of functional indep with daily tasks and activities and selfcare skills .Pt. Will continue to benefit from continued OT to progress towards goals  .    Rehab identified problem list/impairments: impaired endurance, impaired functional mobilty, impaired self care skills, gait instability, impaired balance, edema, decreased safety awareness    Rehab potential is fair    Activity tolerance: Fair    Discharge recommendations: home health OT(with S)     Barriers to discharge: None     Equipment recommendations: bedside commode, walker, rolling     GOALS:   Multidisciplinary Problems     Occupational Therapy Goals        Problem: Occupational Therapy Goal    Goal Priority Disciplines Outcome Interventions   Occupational Therapy Goal     OT, PT/OT     Description:  Goals to be met by: 12-18-19     Patient will increase functional independence with ADLs by performing:    UE Dressing with Set-up Assistance.Met 12/11/19  LE Dressing with Supervision  Grooming while standing with Supervision.  Toileting from toilet with Supervision for hygiene and clothing management.   Bathing from  shower chair/bench with Stand-by Assistance.  Supine to sit with Modified [place].  Stand pivot transfers with Supervision with RW  Toilet transfer to toilet with Supervision with RW  Pt. To engage in standing task x 8 minutes with S  Pt. To identify 3 leisure activities to engage in weekly   Pt. To be I with HEP to improve level of endurance                   Plan:  Patient to be seen 5 x/week to address the above listed problems via self-care/home management, therapeutic activities, therapeutic exercises  Plan of Care expires: 01/09/20  Plan of Care reviewed with: patient  [facility]  and ANGIE have collaborated and discussed the patient's status, treatment plan and progress toward established goals.   ANGIE Parkinson/VIJAY 12/13/2019

## 2019-12-13 NOTE — PLAN OF CARE
Problem: SLP Goal  Goal: SLP Goal  Description  Speech Language Pathology Goals  Goals expected to be met by 12/17:  1. Pt will tolerate a regular consistency diet and thin liquids without s/s of aspiration.   2. Pt will recall 3/3 words or facts after a 3 minute delay given supervision.   3. Pt will answer complex yes/no q's with 80% accuracy.   4. Pt will follow 3-step commands with 70% accuracy given moderate cues.   5. Pt will complete problem solving tasks with 80% accuracy given min-mod cues.   6. Pt will list 8 items in a category within one minute given min-mod cues.  7. Pt will participate in further assessment of reading, writing, and visual spatial abilities to determine need for further intervention.        Outcome: Ongoing, Progressing  Cont POC.     JULIO Mckeon, CCC-SLP  Speech Language Pathologist  [phone]  12/13/2019

## 2019-12-13 NOTE — PLAN OF CARE
Problem: Occupational Therapy Goal  Goal: Occupational Therapy Goal  Description  Goals to be met by: 12-18-19     Patient will increase functional independence with ADLs by performing:    UE Dressing with Set-up Assistance.Met 12/11/19  LE Dressing with Supervision  Grooming while standing with Supervision.  Toileting from toilet with Supervision for hygiene and clothing management.   Bathing from  shower chair/bench with Stand-by Assistance.  Supine to sit with Modified [place].  Stand pivot transfers with Supervision with RW  Toilet transfer to toilet with Supervision with RW  Pt. To engage in standing task x 8 minutes with S  Pt. To identify 3 leisure activities to engage in weekly   Pt. To be I with HEP to improve level of endurance      Outcome: Ongoing, Progressing

## 2019-12-13 NOTE — PLAN OF CARE
Problem: Diabetes Comorbidity  Goal: Blood Glucose Level Within Desired Range  Outcome: Ongoing, Progressing     Problem: Infection  Goal: Infection Symptom Resolution  Outcome: Ongoing, Progressing     Problem: Fall Injury Risk  Goal: Absence of Fall and Fall-Related Injury  Outcome: Ongoing, Progressing

## 2019-12-13 NOTE — PT/OT/SLP PROGRESS
"Speech Language Pathology  Treatment    Bessy Nunez   MRN: [id number]   Admitting Diagnosis: Sepsis due to urinary tract infection    Diet recommendations: Solid Diet Level: Regular  Liquid Diet Level: Thin HOB to 90 degrees, Monitor for s/s of aspiration and Standard aspiration precautions    SLP Treatment Date: 12/13/19  Speech Start Time: 1042     Speech Stop Time: 1114     Speech Total (min): 32 min       TREATMENT BILLABLE MINUTES:  Speech Therapy Individual 24 and [place]eld Care/Home Management Training 8    Has the patient been evaluated by SLP for swallowing? : Yes  Keep patient NPO?: No   General Precautions: Standard, aspiration, diabetic, fall  Current Respiratory Status: room air       Subjective:  "What is this for?" pt asked during a functional memory task.       Objective:      Pt was seen at the bedside with wife and son present.  Pt wore reading glasses to read a functional amara and appointment card.  Error x 1 noted.  Pt recalled information from the amara with 100% accuracy and from appointment card with 22% accuracy ind'ly/44% given cues.  Pt recalled 3/3 facts after a 3 minute delay with 100% accuracy.  Pt ID'd problems in photos with 40% accuracy ind'ly/60% given cues.  Effects and solutions to problems were provided with 80% accuracy ind'ly/100% given cues.  Pt stated possible causes for hypothetical situations with 75% accuracy.  Category items starting with given letters were recalled with 70% accuracy ind'ly/100% given cues.  Education provided to pt and family regarding ongoing assessment of cognitive-linguistic abilities, memory tasks, memory strategies, problem solving tasks, and SLP treatment plan and POC.  Pt will benefit from continued reinforcement.     Assessment:  Bessy Nunez is a 59 y.o. male with a medical diagnosis of Sepsis due to urinary tract infection and presents with cognitive-linguistic deficits.     Discharge recommendations: Discharge Facility/Level of Care Needs: (tbd) "     Goals:   Multidisciplinary Problems     SLP Goals        Problem: SLP Goal    Goal Priority Disciplines Outcome   SLP Goal     SLP Ongoing, Progressing   Description:  Speech Language Pathology Goals  Goals expected to be met by 12/17:  1. Pt will tolerate a regular consistency diet and thin liquids without s/s of aspiration.   2. Pt will recall 3/3 words or facts after a 3 minute delay given supervision.   3. Pt will answer complex yes/no q's with 80% accuracy.   4. Pt will follow 3-step commands with 70% accuracy given moderate cues.   5. Pt will complete problem solving tasks with 80% accuracy given min-mod cues.   6. Pt will list 8 items in a category within one minute given min-mod cues.  7. Pt will participate in further assessment of reading, writing, and visual spatial abilities to determine need for further intervention.                          Plan:   Patient to be seen Therapy Frequency: 3 x/week  Planned Interventions: Cognitive-Linguistic Therapy, Dysphagia Therapy  Plan of Care expires: 01/09/20  Plan of Care reviewed with: patient, spouse, son  SLP Follow-up?: Yes              JULIO Mckeon, CCC-SLP  12/13/2019     JULIO Mckeon, CCC-SLP  Speech Language Pathologist  [phone]  12/13/2019

## 2019-12-13 NOTE — PLAN OF CARE
Problem: Physical Therapy Goal  Goal: Physical Therapy Goal  Description  Goals to be met by: 19     Patient will increase functional independence with mobility by performin. Supine to sit with Modified [place]  2. Sit to supine with Modified [place]  3. Sit to stand transfer with Modified [place] met 2019  4. Bed to chair transfer with Modified [place] w/ or w/o AD  5. Gait  x 150ft feet with Supervision using rollator met 2019  6. Gait x 100ft w/ SPV w/o AD  7. Ascend/Descend 4 inch curb step with Stand-by Assistance using rollator or RW  8. Stand for 5 minutes with Supervision w/ or w/o AD while performing activity met 2019  9. Pt will improve 5 times sit<>stand test time from 27.45s to 25s to decreased fall risk and improve functional mobility         Outcome: Ongoing, Progressing

## 2019-12-14 LAB
POCT GLUCOSE: 100 MG/DL (ref 70–110)
POCT GLUCOSE: 100 MG/DL (ref 70–110)
POCT GLUCOSE: 113 MG/DL (ref 70–110)
POCT GLUCOSE: 124 MG/DL (ref 70–110)

## 2019-12-14 PROCEDURE — 97535 SELF CARE MNGMENT TRAINING: CPT

## 2019-12-14 PROCEDURE — 25000003 PHARM REV CODE 250: Performed by: INTERNAL MEDICINE

## 2019-12-14 PROCEDURE — 25000003 PHARM REV CODE 250: Performed by: NURSE PRACTITIONER

## 2019-12-14 PROCEDURE — 97110 THERAPEUTIC EXERCISES: CPT

## 2019-12-14 PROCEDURE — 11000004 HC SNF PRIVATE

## 2019-12-14 RX ADMIN — METHYLPHENIDATE HYDROCHLORIDE 10 MG: 10 TABLET ORAL at 06:12

## 2019-12-14 RX ADMIN — Medication 400 MG: at 08:12

## 2019-12-14 RX ADMIN — FERROUS SULFATE TAB EC 325 MG (65 MG FE EQUIVALENT) 325 MG: 325 (65 FE) TABLET DELAYED RESPONSE at 08:12

## 2019-12-14 RX ADMIN — Medication 400 MG: at 10:12

## 2019-12-14 RX ADMIN — MICONAZOLE NITRATE: 20 POWDER TOPICAL at 08:12

## 2019-12-14 RX ADMIN — DONEPEZIL HYDROCHLORIDE 5 MG: 5 TABLET, FILM COATED ORAL at 08:12

## 2019-12-14 RX ADMIN — METFORMIN HYDROCHLORIDE 1000 MG: 500 TABLET, FILM COATED ORAL at 06:12

## 2019-12-14 RX ADMIN — ASPIRIN 81 MG: 81 TABLET, DELAYED RELEASE ORAL at 10:12

## 2019-12-14 RX ADMIN — METHYLPHENIDATE HYDROCHLORIDE 10 MG: 10 TABLET ORAL at 07:12

## 2019-12-14 RX ADMIN — DILTIAZEM HYDROCHLORIDE 240 MG: 120 CAPSULE, COATED, EXTENDED RELEASE ORAL at 10:12

## 2019-12-14 RX ADMIN — ATENOLOL 50 MG: 25 TABLET ORAL at 10:12

## 2019-12-14 RX ADMIN — INSULIN DETEMIR 24 UNITS: 100 INJECTION, SOLUTION SUBCUTANEOUS at 10:12

## 2019-12-14 RX ADMIN — QUETIAPINE FUMARATE 25 MG: 25 TABLET ORAL at 08:12

## 2019-12-14 RX ADMIN — IRBESARTAN 300 MG: 75 TABLET ORAL at 08:12

## 2019-12-14 RX ADMIN — SERTRALINE HYDROCHLORIDE 150 MG: 50 TABLET ORAL at 10:12

## 2019-12-14 RX ADMIN — METFORMIN HYDROCHLORIDE 1000 MG: 500 TABLET, FILM COATED ORAL at 10:12

## 2019-12-14 RX ADMIN — MICONAZOLE NITRATE: 20 POWDER TOPICAL at 10:12

## 2019-12-14 RX ADMIN — ATORVASTATIN CALCIUM 40 MG: 20 TABLET, FILM COATED ORAL at 10:12

## 2019-12-14 RX ADMIN — Medication 5000 UNITS: at 10:12

## 2019-12-14 RX ADMIN — Medication 250 MG: at 08:12

## 2019-12-14 NOTE — PLAN OF CARE
Patient resting quietly. No acute distress noted. Encouraged and educated patient to call for assistance with needs to help prevent falls. Call bell in reach safety measures implemented.  Toro cath care performed. Incontinence pad checked frequently to help prevent skin breakdown. Blood glucose monitored as ordered.   Patient stable.

## 2019-12-14 NOTE — PLAN OF CARE
Reviewed plan of care with patient. Reviewed medication regimen with patient. Patient verbalized understanding. Will continue to monitor.

## 2019-12-14 NOTE — PT/OT/SLP PROGRESS
Occupational Therapy  Treatment    Bessy Nunez   MRN: [id number]   Admitting Diagnosis: Sepsis due to urinary tract infection    OT Date of Treatment: 12/14/19       Billable Minutes:  Therapeutic Activity 20 and Therapeutic Exercise 23    General Precautions: Standard, fall, diabetic(blind in right eye)  Orthopedic Precautions: N/A  Braces: N/A    Spiritual, Cultural Beliefs, Alevism Practices, Values that Affect Care: no    Subjective:  Communicated with nurse prior to session.  Pt. Reported that he did not remember what was for lunch today.     Pain/Comfort  Pain Rating 1: 0/10  Pain Rating Post-Intervention 1: 0/10    Objective:  Patient found with: (supine in bed with family present)    Occupational Performance:    Bed Mobility:    · Patient completed Supine to Sit with stand by assistance     Functional Mobility/Transfers:  · Patient completed Sit <> Stand Transfer with stand by assistance  with  no assistive device   · Patient completed Bed <> Chair Transfer using Stand Pivot technique with stand by assistance with no assistive device  · Functional Mobility: Pt. Ambulated in gym x 4 trials of 38 feet; 1st trial with HHA; remainding trials with RW and SBA; pt. Required  Seated rest break between trials    Activities of Daily Living:  · Not tested    [facility] 6 Click:  [facility] Total Score: 16    OT Exercises: AROM with 2# dowel for all major planes of BUE shoulder motion x 2 sets 10 reps and 4 # dowel x 2 sets 10 reps for elbow flex/ext   UBE x 12 minutes with mod resistance      Additional Treatment:  Pt. Engaged in dynamic balloon tapping activity seated with 2# dowel for chest presses x 4 sets 25 reps  Pt. Engaged in dynamic standing activity at counter focusing on balance while weightshifting and reaching into overj=head cabinets and required SBA for task completion.       Patient left up in chair with call button in reach and family present    ASSESSMENT:  Bessy Nunez is a 59 y.o. male with a medical  diagnosis of Sepsis due to urinary tract infection and continues to present with cognitive deficits as well as decreased performance with self-care skills as well as functional mobility. Pt. Tolerated session well and will benefitf rom continued OT services.     Rehab identified problem list/impairments: impaired endurance, impaired functional mobilty, impaired self care skills, gait instability, impaired balance, edema, decreased safety awareness    Rehab potential is good    Activity tolerance: Good    Discharge recommendations: home health OT(with S)     Barriers to discharge: None     Equipment recommendations: bedside commode, walker, rolling     GOALS:   Multidisciplinary Problems     Occupational Therapy Goals        Problem: Occupational Therapy Goal    Goal Priority Disciplines Outcome Interventions   Occupational Therapy Goal     OT, PT/OT Ongoing, Progressing    Description:  Goals to be met by: 12-18-19     Patient will increase functional independence with ADLs by performing:    UE Dressing with Set-up Assistance.Met 12/11/19  LE Dressing with Supervision  Grooming while standing with Supervision.  Toileting from toilet with Supervision for hygiene and clothing management.   Bathing from  shower chair/bench with Stand-by Assistance.  Supine to sit with Modified [place].  Stand pivot transfers with Supervision with RW  Toilet transfer to toilet with Supervision with RW  Pt. To engage in standing task x 8 minutes with S  Pt. To identify 3 leisure activities to engage in weekly   Pt. To be I with HEP to improve level of endurance                       Plan:  Patient to be seen 5 x/week to address the above listed problems via self-care/home management, therapeutic activities, therapeutic exercises  Plan of Care expires: 01/09/20  Plan of Care reviewed with: patient    COLTON Lehman  12/14/2019

## 2019-12-15 LAB
POCT GLUCOSE: 108 MG/DL (ref 70–110)
POCT GLUCOSE: 111 MG/DL (ref 70–110)
POCT GLUCOSE: 126 MG/DL (ref 70–110)
POCT GLUCOSE: 98 MG/DL (ref 70–110)

## 2019-12-15 PROCEDURE — 25000003 PHARM REV CODE 250: Performed by: INTERNAL MEDICINE

## 2019-12-15 PROCEDURE — 11000004 HC SNF PRIVATE

## 2019-12-15 RX ADMIN — ATENOLOL 50 MG: 25 TABLET ORAL at 10:12

## 2019-12-15 RX ADMIN — INSULIN DETEMIR 24 UNITS: 100 INJECTION, SOLUTION SUBCUTANEOUS at 10:12

## 2019-12-15 RX ADMIN — METFORMIN HYDROCHLORIDE 1000 MG: 500 TABLET, FILM COATED ORAL at 10:12

## 2019-12-15 RX ADMIN — METFORMIN HYDROCHLORIDE 1000 MG: 500 TABLET, FILM COATED ORAL at 05:12

## 2019-12-15 RX ADMIN — MICONAZOLE NITRATE: 20 POWDER TOPICAL at 08:12

## 2019-12-15 RX ADMIN — DONEPEZIL HYDROCHLORIDE 5 MG: 5 TABLET, FILM COATED ORAL at 08:12

## 2019-12-15 RX ADMIN — ATORVASTATIN CALCIUM 40 MG: 20 TABLET, FILM COATED ORAL at 10:12

## 2019-12-15 RX ADMIN — ASPIRIN 81 MG: 81 TABLET, DELAYED RELEASE ORAL at 10:12

## 2019-12-15 RX ADMIN — QUETIAPINE FUMARATE 25 MG: 25 TABLET ORAL at 08:12

## 2019-12-15 RX ADMIN — METHYLPHENIDATE HYDROCHLORIDE 10 MG: 10 TABLET ORAL at 10:12

## 2019-12-15 RX ADMIN — IRBESARTAN 300 MG: 75 TABLET ORAL at 08:12

## 2019-12-15 RX ADMIN — METHYLPHENIDATE HYDROCHLORIDE 10 MG: 10 TABLET ORAL at 05:12

## 2019-12-15 RX ADMIN — MICONAZOLE NITRATE: 20 POWDER TOPICAL at 10:12

## 2019-12-15 RX ADMIN — SERTRALINE HYDROCHLORIDE 150 MG: 50 TABLET ORAL at 10:12

## 2019-12-15 RX ADMIN — DILTIAZEM HYDROCHLORIDE 240 MG: 120 CAPSULE, COATED, EXTENDED RELEASE ORAL at 10:12

## 2019-12-15 RX ADMIN — Medication 5000 UNITS: at 10:12

## 2019-12-16 ENCOUNTER — OFFICE VISIT (OUTPATIENT)
Dept: RHEUMATOLOGY | Facility: CLINIC | Age: 59
DRG: 872 | End: 2019-12-16
Attending: INTERNAL MEDICINE
Payer: MEDICARE

## 2019-12-16 ENCOUNTER — LAB VISIT (OUTPATIENT)
Dept: LAB | Facility: HOSPITAL | Age: 59
End: 2019-12-16
Attending: INTERNAL MEDICINE
Payer: MEDICARE

## 2019-12-16 VITALS
DIASTOLIC BLOOD PRESSURE: 84 MMHG | HEIGHT: 71 IN | BODY MASS INDEX: 30.9 KG/M2 | HEART RATE: 68 BPM | SYSTOLIC BLOOD PRESSURE: 146 MMHG | TEMPERATURE: 99 F

## 2019-12-16 DIAGNOSIS — Z11.4 ENCOUNTER FOR SCREENING FOR HUMAN IMMUNODEFICIENCY VIRUS (HIV): ICD-10-CM

## 2019-12-16 DIAGNOSIS — I77.6 CNS VASCULITIS: ICD-10-CM

## 2019-12-16 DIAGNOSIS — D84.9 IMMUNOSUPPRESSION: ICD-10-CM

## 2019-12-16 DIAGNOSIS — I77.6 CNS VASCULITIS: Primary | ICD-10-CM

## 2019-12-16 LAB
ANION GAP SERPL CALC-SCNC: 9 MMOL/L (ref 8–16)
BASOPHILS # BLD AUTO: 0.03 K/UL (ref 0–0.2)
BASOPHILS NFR BLD: 0.5 % (ref 0–1.9)
BUN SERPL-MCNC: 17 MG/DL (ref 8–23)
CALCIUM SERPL-MCNC: 9 MG/DL (ref 8.7–10.5)
CHLORIDE SERPL-SCNC: 109 MMOL/L (ref 95–110)
CO2 SERPL-SCNC: 24 MMOL/L (ref 23–29)
CREAT SERPL-MCNC: 1.1 MG/DL (ref 0.5–1.4)
DIFFERENTIAL METHOD: ABNORMAL
EOSINOPHIL # BLD AUTO: 0.2 K/UL (ref 0–0.5)
EOSINOPHIL NFR BLD: 2.5 % (ref 0–8)
ERYTHROCYTE [DISTWIDTH] IN BLOOD BY AUTOMATED COUNT: 14.6 % (ref 11.5–14.5)
EST. GFR  (AFRICAN AMERICAN): >60 ML/MIN/1.73 M^2
EST. GFR  (NON AFRICAN AMERICAN): >60 ML/MIN/1.73 M^2
GLUCOSE SERPL-MCNC: 83 MG/DL (ref 70–110)
HBV CORE AB SERPL QL IA: NEGATIVE
HBV SURFACE AB SER-ACNC: NEGATIVE M[IU]/ML
HBV SURFACE AG SERPL QL IA: NEGATIVE
HCT VFR BLD AUTO: 29.4 % (ref 40–54)
HCV AB SERPL QL IA: NEGATIVE
HGB BLD-MCNC: 9.6 G/DL (ref 14–18)
HIV 1+2 AB+HIV1 P24 AG SERPL QL IA: NEGATIVE
IMM GRANULOCYTES # BLD AUTO: 0.02 K/UL (ref 0–0.04)
IMM GRANULOCYTES NFR BLD AUTO: 0.3 % (ref 0–0.5)
LYMPHOCYTES # BLD AUTO: 1.4 K/UL (ref 1–4.8)
LYMPHOCYTES NFR BLD: 22 % (ref 18–48)
MAGNESIUM SERPL-MCNC: 1.5 MG/DL (ref 1.6–2.6)
MCH RBC QN AUTO: 28.8 PG (ref 27–31)
MCHC RBC AUTO-ENTMCNC: 32.7 G/DL (ref 32–36)
MCV RBC AUTO: 88 FL (ref 82–98)
MONOCYTES # BLD AUTO: 0.6 K/UL (ref 0.3–1)
MONOCYTES NFR BLD: 8.7 % (ref 4–15)
NEUTROPHILS # BLD AUTO: 4.2 K/UL (ref 1.8–7.7)
NEUTROPHILS NFR BLD: 66 % (ref 38–73)
NRBC BLD-RTO: 0 /100 WBC
PHOSPHATE SERPL-MCNC: 3.8 MG/DL (ref 2.7–4.5)
PLATELET # BLD AUTO: 187 K/UL (ref 150–350)
PMV BLD AUTO: 9.6 FL (ref 9.2–12.9)
POCT GLUCOSE: 166 MG/DL (ref 70–110)
POCT GLUCOSE: 192 MG/DL (ref 70–110)
POCT GLUCOSE: 87 MG/DL (ref 70–110)
POCT GLUCOSE: 96 MG/DL (ref 70–110)
POTASSIUM SERPL-SCNC: 3.7 MMOL/L (ref 3.5–5.1)
RBC # BLD AUTO: 3.33 M/UL (ref 4.6–6.2)
SODIUM SERPL-SCNC: 142 MMOL/L (ref 136–145)
WBC # BLD AUTO: 6.35 K/UL (ref 3.9–12.7)

## 2019-12-16 PROCEDURE — 3008F BODY MASS INDEX DOCD: CPT | Mod: CPTII,S$GLB,, | Performed by: INTERNAL MEDICINE

## 2019-12-16 PROCEDURE — 99900058 HC 022 PAID UNDER SNF PPS

## 2019-12-16 PROCEDURE — 3077F SYST BP >= 140 MM HG: CPT | Mod: CPTII,S$GLB,, | Performed by: INTERNAL MEDICINE

## 2019-12-16 PROCEDURE — 25000003 PHARM REV CODE 250: Performed by: INTERNAL MEDICINE

## 2019-12-16 PROCEDURE — 92507 TX SP LANG VOICE COMM INDIV: CPT

## 2019-12-16 PROCEDURE — 86703 HIV-1/HIV-2 1 RESULT ANTBDY: CPT

## 2019-12-16 PROCEDURE — 85025 COMPLETE CBC W/AUTO DIFF WBC: CPT

## 2019-12-16 PROCEDURE — 86704 HEP B CORE ANTIBODY TOTAL: CPT

## 2019-12-16 PROCEDURE — 86803 HEPATITIS C AB TEST: CPT

## 2019-12-16 PROCEDURE — 3077F PR MOST RECENT SYSTOLIC BLOOD PRESSURE >= 140 MM HG: ICD-10-PCS | Mod: CPTII,S$GLB,, | Performed by: INTERNAL MEDICINE

## 2019-12-16 PROCEDURE — 83735 ASSAY OF MAGNESIUM: CPT

## 2019-12-16 PROCEDURE — 97535 SELF CARE MNGMENT TRAINING: CPT

## 2019-12-16 PROCEDURE — 99999 PR PBB SHADOW E&M-EST. PATIENT-LVL IV: ICD-10-PCS | Mod: PBBFAC,,, | Performed by: INTERNAL MEDICINE

## 2019-12-16 PROCEDURE — 25000003 PHARM REV CODE 250: Performed by: NURSE PRACTITIONER

## 2019-12-16 PROCEDURE — 3008F PR BODY MASS INDEX (BMI) DOCUMENTED: ICD-10-PCS | Mod: CPTII,S$GLB,, | Performed by: INTERNAL MEDICINE

## 2019-12-16 PROCEDURE — 99215 OFFICE O/P EST HI 40 MIN: CPT | Mod: S$GLB,,, | Performed by: INTERNAL MEDICINE

## 2019-12-16 PROCEDURE — 80048 BASIC METABOLIC PNL TOTAL CA: CPT

## 2019-12-16 PROCEDURE — 3079F PR MOST RECENT DIASTOLIC BLOOD PRESSURE 80-89 MM HG: ICD-10-PCS | Mod: CPTII,S$GLB,, | Performed by: INTERNAL MEDICINE

## 2019-12-16 PROCEDURE — 99215 PR OFFICE/OUTPT VISIT, EST, LEVL V, 40-54 MIN: ICD-10-PCS | Mod: S$GLB,,, | Performed by: INTERNAL MEDICINE

## 2019-12-16 PROCEDURE — 36415 COLL VENOUS BLD VENIPUNCTURE: CPT

## 2019-12-16 PROCEDURE — 3079F DIAST BP 80-89 MM HG: CPT | Mod: CPTII,S$GLB,, | Performed by: INTERNAL MEDICINE

## 2019-12-16 PROCEDURE — 99999 PR PBB SHADOW E&M-EST. PATIENT-LVL IV: CPT | Mod: PBBFAC,,, | Performed by: INTERNAL MEDICINE

## 2019-12-16 PROCEDURE — 84100 ASSAY OF PHOSPHORUS: CPT

## 2019-12-16 PROCEDURE — 87340 HEPATITIS B SURFACE AG IA: CPT

## 2019-12-16 PROCEDURE — 11000004 HC SNF PRIVATE

## 2019-12-16 PROCEDURE — 86706 HEP B SURFACE ANTIBODY: CPT

## 2019-12-16 RX ORDER — POTASSIUM CHLORIDE 20 MEQ/1
20 TABLET, EXTENDED RELEASE ORAL ONCE
Status: COMPLETED | OUTPATIENT
Start: 2019-12-16 | End: 2019-12-16

## 2019-12-16 RX ORDER — MEPERIDINE HYDROCHLORIDE 50 MG/ML
25 INJECTION INTRAMUSCULAR; INTRAVENOUS; SUBCUTANEOUS
Status: CANCELLED | OUTPATIENT
Start: 2019-12-16

## 2019-12-16 RX ORDER — ACETAMINOPHEN 325 MG/1
650 TABLET ORAL
Status: CANCELLED | OUTPATIENT
Start: 2019-12-16

## 2019-12-16 RX ORDER — HEPARIN 100 UNIT/ML
500 SYRINGE INTRAVENOUS
Status: CANCELLED | OUTPATIENT
Start: 2019-12-16

## 2019-12-16 RX ORDER — SODIUM CHLORIDE 0.9 % (FLUSH) 0.9 %
10 SYRINGE (ML) INJECTION
Status: CANCELLED | OUTPATIENT
Start: 2019-12-16

## 2019-12-16 RX ORDER — LANOLIN ALCOHOL/MO/W.PET/CERES
400 CREAM (GRAM) TOPICAL 2 TIMES DAILY
Status: COMPLETED | OUTPATIENT
Start: 2019-12-16 | End: 2019-12-17

## 2019-12-16 RX ORDER — METHYLPREDNISOLONE SOD SUCC 125 MG
125 VIAL (EA) INJECTION ONCE
Status: CANCELLED | OUTPATIENT
Start: 2019-12-16

## 2019-12-16 RX ORDER — DIPHENHYDRAMINE HCL 25 MG
25 CAPSULE ORAL
Status: CANCELLED | OUTPATIENT
Start: 2019-12-16

## 2019-12-16 RX ADMIN — INSULIN ASPART 2 UNITS: 100 INJECTION, SOLUTION INTRAVENOUS; SUBCUTANEOUS at 05:12

## 2019-12-16 RX ADMIN — DONEPEZIL HYDROCHLORIDE 5 MG: 5 TABLET, FILM COATED ORAL at 08:12

## 2019-12-16 RX ADMIN — INSULIN DETEMIR 24 UNITS: 100 INJECTION, SOLUTION SUBCUTANEOUS at 08:12

## 2019-12-16 RX ADMIN — ASPIRIN 81 MG: 81 TABLET, DELAYED RELEASE ORAL at 08:12

## 2019-12-16 RX ADMIN — SERTRALINE HYDROCHLORIDE 150 MG: 50 TABLET ORAL at 08:12

## 2019-12-16 RX ADMIN — METHYLPHENIDATE HYDROCHLORIDE 10 MG: 10 TABLET ORAL at 08:12

## 2019-12-16 RX ADMIN — Medication 400 MG: at 12:12

## 2019-12-16 RX ADMIN — METFORMIN HYDROCHLORIDE 1000 MG: 500 TABLET, FILM COATED ORAL at 08:12

## 2019-12-16 RX ADMIN — METHYLPHENIDATE HYDROCHLORIDE 10 MG: 10 TABLET ORAL at 05:12

## 2019-12-16 RX ADMIN — Medication 5000 UNITS: at 08:12

## 2019-12-16 RX ADMIN — QUETIAPINE FUMARATE 25 MG: 25 TABLET ORAL at 08:12

## 2019-12-16 RX ADMIN — METFORMIN HYDROCHLORIDE 1000 MG: 500 TABLET, FILM COATED ORAL at 05:12

## 2019-12-16 RX ADMIN — Medication 400 MG: at 08:12

## 2019-12-16 RX ADMIN — SENNOSIDES AND DOCUSATE SODIUM 1 TABLET: 8.6; 5 TABLET ORAL at 08:12

## 2019-12-16 RX ADMIN — INSULIN ASPART 2 UNITS: 100 INJECTION, SOLUTION INTRAVENOUS; SUBCUTANEOUS at 12:12

## 2019-12-16 RX ADMIN — POTASSIUM CHLORIDE 20 MEQ: 1500 TABLET, EXTENDED RELEASE ORAL at 12:12

## 2019-12-16 RX ADMIN — MICONAZOLE NITRATE: 20 POWDER TOPICAL at 09:12

## 2019-12-16 RX ADMIN — Medication 250 MG: at 08:12

## 2019-12-16 RX ADMIN — ATORVASTATIN CALCIUM 40 MG: 20 TABLET, FILM COATED ORAL at 08:12

## 2019-12-16 RX ADMIN — FERROUS SULFATE TAB EC 325 MG (65 MG FE EQUIVALENT) 325 MG: 325 (65 FE) TABLET DELAYED RESPONSE at 08:12

## 2019-12-16 RX ADMIN — IRBESARTAN 300 MG: 75 TABLET ORAL at 08:12

## 2019-12-16 RX ADMIN — MICONAZOLE NITRATE: 20 POWDER TOPICAL at 08:12

## 2019-12-16 RX ADMIN — DILTIAZEM HYDROCHLORIDE 240 MG: 120 CAPSULE, COATED, EXTENDED RELEASE ORAL at 08:12

## 2019-12-16 ASSESSMENT — ROUTINE ASSESSMENT OF PATIENT INDEX DATA (RAPID3)
MDHAQ FUNCTION SCORE: 1.6
PSYCHOLOGICAL DISTRESS SCORE: 2.2
PAIN SCORE: .5
PATIENT GLOBAL ASSESSMENT SCORE: 8
AM STIFFNESS SCORE: 0, NO
FATIGUE SCORE: 10
TOTAL RAPID3 SCORE: 4.61

## 2019-12-16 NOTE — PT/OT/SLP PROGRESS
Occupational Therapy  Treatment    Bessy Nunez   MRN: [id number]   Admitting Diagnosis: Sepsis due to urinary tract infection    OT Date of Treatment: 12/16/19       Billable Minutes:  Self Care/Home Management 40    General Precautions: Standard, fall, diabetic(blind in right eye)  Orthopedic Precautions: N/A  Braces: N/A    Spiritual, Cultural Beliefs, Caodaism Practices, Values that Affect Care: no    Subjective:  Communicated with nsg prior to session.  I am doing well today    Pain/Comfort  Pain Rating 1: 0/10  Pain Rating 2: 0/10    Objective:   Pt. Supine on arrival    Occupational Performance:    Bed Mobility:    · Patient completed Supine to Sit with stand by assistance     Functional Mobility/Transfers:  · Patient completed Sit <> Stand Transfer with stand by assistance and contact guard assistance  with  hand-held assist   · Patient completed Bed <> Chair Transfer using Stand Pivot technique with stand by assistance with no assistive device  · Patient completed Toilet Transfer Stand Pivot technique with contact guard assistance with  hand-held assist      Activities of Daily Living:  · Grooming: stand by assistance at sink level  · Bathing: contact guard assistance standing at sink level for matt area  · Upper Body Dressing: minimum assistance to don pull over shirt  · Lower Body Dressing: contact guard assistance to [place]ug[place] depends and patns (A) with donning RLE through pants  legs  · Toileting: contact guard assistance with clothing/cleaning management     [facility] 6 Click:  [facility] Total Score: 18    Patient left supine with all lines intact and call button in reach    ASSESSMENT:  Bessy Nunez is a 59 y.o. male with a medical diagnosis of Sepsis due to urinary tract infection Pt. participated well with session on this day. Pt. Continues to require cues through session with sequencing through task Pt. Does require cues for initiation  toward basic task.   Pt demos physical deficits with balance  functional  mobility, UB strength, endurance  level of functional indep with daily tasks and activities and selfcare skills .Pt. Will continue to benefit from continued OT to progress towards goals  .    Rehab identified problem list/impairments: impaired endurance, impaired functional mobilty, impaired self care skills, gait instability, impaired balance, edema, decreased safety awareness    Rehab potential is fair    Activity tolerance: Fair    Discharge recommendations: home health OT(with S)     Barriers to discharge: None     Equipment recommendations: bedside commode, walker, rolling     GOALS:   Multidisciplinary Problems     Occupational Therapy Goals        Problem: Occupational Therapy Goal    Goal Priority Disciplines Outcome Interventions   Occupational Therapy Goal     OT, PT/OT Ongoing, Progressing    Description:  Goals to be met by: 12-18-19     Patient will increase functional independence with ADLs by performing:    UE Dressing with Set-up Assistance.Met 12/11/19  LE Dressing with Supervision  Grooming while standing with Supervision.  Toileting from toilet with Supervision for hygiene and clothing management.   Bathing from  shower chair/bench with Stand-by Assistance.  Supine to sit with Modified [place].  Stand pivot transfers with Supervision with RW  Toilet transfer to toilet with Supervision with RW  Pt. To engage in standing task x 8 minutes with S  Pt. To identify 3 leisure activities to engage in weekly   Pt. To be I with HEP to improve level of endurance                   Plan:  Patient to be seen 5 x/week to address the above listed problems via self-care/home management, therapeutic activities, therapeutic exercises  Plan of Care expires: 01/09/20  Plan of Care reviewed with: patient    BONITA Parkinson  12/16/2019

## 2019-12-16 NOTE — PLAN OF CARE
Problem: SLP Goal  Goal: SLP Goal  Description  Speech Language Pathology Goals  Goals expected to be met by 12/17:  1. Pt will tolerate a regular consistency diet and thin liquids without s/s of aspiration.   2. Pt will recall 3/3 words or facts after a 3 minute delay given supervision.   3. Pt will answer complex yes/no q's with 80% accuracy.   4. Pt will follow 3-step commands with 70% accuracy given moderate cues.   5. Pt will complete problem solving tasks with 80% accuracy given min-mod cues.   6. Pt will list 8 items in a category within one minute given min-mod cues.  7. Pt will participate in further assessment of reading, writing, and visual spatial abilities to determine need for further intervention.        Outcome: Ongoing, Progressing  Pt making progress towards meeting goals.  D/c scheduled for 12/18.  Recommend  SLP evaluation in home setting to determine if pt can benefit from continued services in that setting.   JULIO Mckeon, CCC-SLP  Speech Language Pathologist  [phone]  12/16/2019

## 2019-12-16 NOTE — PLAN OF CARE
Problem: Adult Inpatient Plan of Care  Goal: Plan of Care Review  Outcome: Ongoing, Progressing  Goal: Patient-Specific Goal (Individualization)  Outcome: Ongoing, Progressing  Goal: Absence of Hospital-Acquired Illness or Injury  Outcome: Ongoing, Progressing  Goal: Optimal Comfort and Wellbeing  Outcome: Ongoing, Progressing  Goal: Readiness for Transition of Care  Outcome: Ongoing, Progressing  Goal: Rounds/Family Conference  Outcome: Ongoing, Progressing     Problem: Diabetes Comorbidity  Goal: Blood Glucose Level Within Desired Range  Outcome: Ongoing, Progressing     Problem: Infection  Goal: Infection Symptom Resolution  Outcome: Ongoing, Progressing     Problem: Fall Injury Risk  Goal: Absence of Fall and Fall-Related Injury  Outcome: Ongoing, Progressing     Problem: Skin Injury Risk Increased  Goal: Skin Health and Integrity  Outcome: Ongoing, Progressing

## 2019-12-16 NOTE — PT/OT/SLP PROGRESS
Speech Language Pathology  Treatment    Bessy Nunez   MRN: [id number]   Admitting Diagnosis: Sepsis due to urinary tract infection    Diet recommendations: Solid Diet Level: Regular  Liquid Diet Level: Thin Monitor for s/s of aspiration and Standard aspiration precautions    SLP Treatment Date: 12/16/19  Speech Start Time: 0834     Speech Stop Time: 0900     Speech Total (min): 26 min       TREATMENT BILLABLE MINUTES:  Speech Therapy Individual 26    Has the patient been evaluated by SLP for swallowing? : Yes  Keep patient NPO?: No   General Precautions: Standard, aspiration, diabetic, fall  Current Respiratory Status: room air       Subjective:  Pt finishing eating breakfast upon entry. Wife present.         Objective:      Pt observed eating one piece of fruit and swallowing 2 sets of multiple pills with cup sips of water with no s/s of aspiration.  Pt was O x 4.  He followed 3-step commands with 100% accuracy.  Functional directions of increasing length and complexity were immediately recalled with 75% accuracy ind'ly/85% given cues.  Word deductions were solved with 90% accuracy ind'ly/100% given cues.  Pt's discharge date noted to be scheduled for 12/18. SLP will plan to see pt on 12/17,but not on day of discharge.  Recommending  SLP evaluation to determine if pt would benefit from continued cognitive therapy in his home setting.     Assessment:  Bessy Nunez is a 59 y.o. male with a medical diagnosis of Sepsis due to urinary tract infection and presents with cognitive-linguistic deficits (below baseline per wife).     Discharge recommendations: Discharge Facility/Level of Care Needs: home health speech therapy     Goals:   Multidisciplinary Problems     SLP Goals        Problem: SLP Goal    Goal Priority Disciplines Outcome   SLP Goal     SLP Ongoing, Progressing   Description:  Speech Language Pathology Goals  Goals expected to be met by 12/17:  1. Pt will tolerate a regular consistency diet and thin  liquids without s/s of aspiration.   2. Pt will recall 3/3 words or facts after a 3 minute delay given supervision.   3. Pt will answer complex yes/no q's with 80% accuracy.   4. Pt will follow 3-step commands with 70% accuracy given moderate cues.   5. Pt will complete problem solving tasks with 80% accuracy given min-mod cues.   6. Pt will list 8 items in a category within one minute given min-mod cues.  7. Pt will participate in further assessment of reading, writing, and visual spatial abilities to determine need for further intervention.                          Plan:   Patient to be seen Therapy Frequency: 3 x/week  Planned Interventions: Cognitive-Linguistic Therapy, Dysphagia Therapy  Plan of Care expires: 01/09/20  Plan of Care reviewed with: patient, spouse  SLP Follow-up?: Yes              JULIO Mckeon, CCC-SLP  12/16/2019     JULIO Mckeon, CCC-SLP  Speech Language Pathologist  [phone]  12/16/2019

## 2019-12-16 NOTE — PLAN OF CARE
Problem: Occupational Therapy Goal  Goal: Occupational Therapy Goal  Description  Goals to be met by: 12-18-19     Patient will increase functional independence with ADLs by performing:    UE Dressing with Set-up Assistance.Met 12/11/19  LE Dressing with Supervision  Grooming while standing with Supervision.  Toileting from toilet with Supervision for hygiene and clothing management.   Bathing from  shower chair/bench with Stand-by Assistance.  Supine to sit with Modified [place].  Stand pivot transfers with Supervision with RW  Toilet transfer to toilet with Supervision with RW  Pt. To engage in standing task x 8 minutes with S  Pt. To identify 3 leisure activities to engage in weekly   Pt. To be I with HEP to improve level of endurance      Outcome: Ongoing, Progressing

## 2019-12-16 NOTE — LETTER
December 16, 2019      Josefina Urena MD  [address] [address]           [facility] - Rheumatology  [address]  [facility] [address]  Phone: [phone]  Fax: [phone]          Patient: Bessy Nunez   MR Number: [id number]   YOB: 1960   Date of Visit: 12/16/2019       Dear Dr. Josefina Urena:    Thank you for referring Bessy Nunez to me for evaluation. Attached you will find relevant portions of my assessment and plan of care.    If you have questions, please do not hesitate to call me. I look forward to following Bessy Nunez along with you.    Sincerely,    Kay Troy MD    Enclosure  CC:  No Recipients    If you would like to receive this communication electronically, please contact [email or web address] or [phone] to request more information on [facility] Link access.    For providers and/or their staff who would like to refer a patient to [facility], please contact us through our one-stop-shop provider referral line, [facility], at [phone].    If you feel you have received this communication in error or would no longer like to receive these types of communications, please e-mail [email or web address]

## 2019-12-16 NOTE — PROGRESS NOTES
[facility]                                  Skilled Nursing Facility                   Progress Note     Admit Date: 12/9/2019  BELIA TBD  Principal Problem:  Sepsis due to urinary tract infection   HPI obtained from patient interview and chart review     Chief Complaint:Revaluation of medical treatment and therapy status: Lab review; hypokalemia; hypomagnesemia    HPI:   Mr. Nunez is 58 year old male with PMHx of primary CNS Vasculitis (on cytoxan monthly), DM2, and Hx of CVA who presents to SNF following a hospitalization for urosepsis with UTI.  Admission to SNF for secondary weakness and debility.     Interval History   All of today's labs reviewed and are listed below. K low at 3.7, Mg low at 1.5- asymptomatic.  H&H stable at 9.6/29.  24 hr vital sign ranges listed below.  24 hr blood pressure range is 136/66 to 145/70.  24 hr blood glucose range is .  Patient's wife is bedside and states that her and the patient felt nauseous and had 1 episode of vomiting this morning.  She thinks he may have a stomach bug.  Patient has remained afebrile and was is without leukocytosis.  Patient denies any following nausea or abdominal cramping.  Patient is at his baseline mentation.  Patient now reports regular bowel movements.  Patient denies trouble sleeping at night, shortness of breath or cough, abdominal discomfort, nausea, or vomiting.  Patient reports an adequate appetite.  Patient denies dysuria. Patient progessing with PT/OT. Continuing to follow and treat all acute and chronic conditions.     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    Past Surgical History: Patient has a past surgical history that includes Skin cancer excision; Insertion of tunneled central venous catheter (CVC) with subcutaneous port (N/A, 12/7/2018); and Colonoscopy (N/A, 5/21/2019).    Social History: Patient reports that he has never smoked. He has never used smokeless tobacco. He reports that he does not drink alcohol or use drugs.    Family History: family history includes Cancer in his father; Cataracts in his mother; Diabetes in his maternal aunt; Heart disease in his mother; Heart failure in his mother; Hypertension in his mother; Rheum arthritis in his paternal grandmother; Stroke in his sister.    Allergies: Patient has No Known Allergies.    ROS  Constitutional: Negative for fever. + fatigue.   Eyes: Negative for blurred vision, double vision and discharge.   Respiratory: Negative for cough, shortness of breath and wheezing.    Cardiovascular: Negative for chest pain, palpitations, and leg swelling.   Gastrointestinal: Negative for abdominal pain, diarrhea, constipation.  +nausea, vomiting x1 episode  Genitourinary: Negative for dysuria, frequency and urgency.   Musculoskeletal:  + generalized weakness. Negative for back pain and myalgias.   Skin: Negative for itching and rash.   Neurological: Negative for dizziness, speech change, and headaches.   Psychiatric/Behavioral: Negative for depression. The patient is not nervous/anxious.      PEx  Temp:  [97.7 °F (36.5 °C)-97.8 °F (36.6 °C)]   Pulse:  [60-66]   Resp:  [18]   BP: (136-145)/(66-70)   SpO2:  [96 %]      Constitutional: Patient appears well-developed and in no distress   HENT:   Head: Normocephalic  and atraumatic.   Eyes: Pupils are equal, round  Neck: Normal range of motion. Neck supple.   Cardiovascular: Normal rate, regular rhythm and normal heart sounds.    Pulmonary/Chest: Effort normal and breath sounds are clear  Abdominal:  Protuberant, Soft. Bowel sounds are normal.   Musculoskeletal: Normal range of motion.   Neurological: Alert and oriented to person, place, and time.  No focal weakness noted. Impaired higher level thinking  Psychiatric:  Flat affect. Behavior is normal.   Skin: Skin is warm and dry. Moisture associated dermatitis noted to groin area    Recent Labs   Lab 12/16/19  0535      K 3.7      CO2 24   BUN 17   CREATININE 1.1   MG 1.5*       Recent Labs   Lab 12/16/19  0535   WBC 6.35   RBC 3.33*   HGB 9.6*   HCT 29.4*      MCV 88   MCH 28.8   MCHC 32.7         Assessment and Plan:         Problems addressed today      Hypomagnesemia  - initiated magnesium oxide 400 mg BID x 3 days    Constipation  - stable, continue senna docusate 1 tab BID and MiraLax daily    DM2 with CKD II and HTN  - 12/4 HgbA1c now 7.4%  - Home lantus 40 units BID (60 units BID with cytoxan infusions), novolog 20 units subq TID and liraglutide 1.8 mg subq daily, metformin 1000mg BID  - Continue lantus 24 units subq qhs  - continue Metformin 1 g BID  - 12/16 stable, continue current treatment    Essential HTN  - Continue home atenolol 50 mg daily, diltiazem 240 mg daily, irbesartan 300 mg qhs  - prn hydralazine for sbp >180  - 12/16 stable, continue current treatment    Septic Encephalopathy  Acute metabolic encephalopathy  - neuro status is currently at baseline    Chronic diastolic dysfunction   - appears euvolemic on exam         Ongoing but stable problems        Severe Sepsis 2/2 UTI  Complicated E. Coli UTI  - resolved, Completed course of antibiotics; no current symptoms     CNS Vasculitis  dementia with behavioral disturbance  - Receives cytoxan monthly  - Continue meds donepezil 5 mg qhs,  sertraline 100 mg daily, quetiapine 25 mg qhs, and methylphenidate 10 mg BID  - continue Vitamin D 5000 unit daily  - continue Speech therapy evaluation     Candidal Intertrigo  - Rash noted in folds of groin, continue 2% topical miconazole     CHASE Cirrhosis   - chronic and stable     DM II and Dyslipidemia:  - continue atorvastatin 40 mg daily     Hx of CVA  Left sided weakness  - continue asa 81 mg daily and atorvastatin     Chemotherapy induced Thrombocytopenia  - Attributed to cytoxan     Anemia of iron deficiency   Anemia associated with chemotherapy  - continue Iron sulfate 325 mg + ascorbic acid 250 mg qhs  - checking Stool occult     DJD of multiple sites   - PT/OT     Osteoporosis   - receiving vitamin D supplementation and on bisphosphonate therapy     Depression, chronic recurring    - continue sertraline 100 mg daily      Future Appointments   Date Time Provider Department Center   12/19/2019  1:00 PM Edgar Nova MD [facility]   12/23/2019 11:00 AM RD VelazquezW [facility] SOCL [facility]   12/24/2019  9:30 AM LAB, HEMONC SAME DAY [facility]   12/24/2019 10:30 AM Lobito Goldstein MD [facility] BM [facility]   12/24/2019 11:00 AM [facility], CHEMO [facility]   1/6/2020  1:30 PM GABRIEL Somers, CNS [facility]   2/7/2020  1:00 PM DC, VISUAL FIELDS NOM [facility]   2/7/2020  1:30 PM Lobito Willson MD [facility] OPHTHAL [facility]       Ebonie Gonsalves NP      DOS: 12/16/2019       Patient note was created using MModal Dictation.  Any errors in syntax or even information may not have been identified and edited on initial review prior to signing this note.

## 2019-12-16 NOTE — PT/OT/SLP PROGRESS
Physical Therapy      Patient Name:  Bessy Nunez   MRN:  [id number]    Patient not seen today secondary to Patient ill (Comment), Patient unwilling to participate(sleeping upon arrival). First attempt: pt sleeping upon arrival, wife stated pt had been throwing up earlier. Second attempt: pt unwilling to participate due to feeling ill and fatigued. Will follow-up 12/17/19.    Loren Sifuentes, PTA

## 2019-12-16 NOTE — PROGRESS NOTES
Subjective:       Patient ID: Bessy Nunez is a 59 y.o. male.    Chief Complaint: CNS Vasculitis    HPI:  Bessy Nunez is a 59 y.o. 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  Treated with Cytoxan monthly since 2017.  After Cytoxan his head is cleared for 2-3 weeks and he falls left.    Dr. Urias is concerned about toxicity of chemotherapy.    Ritalin recently started by Dr. Love has helped to clear his brain up.      Due for Cytoxan 11/13/19 and wife feels that he is having more activity than usual now based on shuffling gait, almost fell and did not put his hands up, he has been hiding his medicine.      Ankles swell if he sits up a great deal.        Note per heme/onc NP Robina:  Mr. Nunez presents to clinic today with his wife for Cytoxan infusion for CNS Vasculitis. This will be dose #28. Cytoxan was stopped in 2018, and neurologic symptoms worsened so cytoxan was resumed. Since resuming Cytoxan, neurologic symptoms have improved. He notes profound chronic fatigue, but states energy levels improved right after receiving cytoxan. Other than moderate cytopenais mid cycles, pt is tolerating this well. Has upcoming rheumatology appt to discuss alternative therapies in light of ongoing concerns to prolonged high dose alkylator exposure. This appt is scheduled for 11/4/19, so he is planned to proceed with next cycle today. He missed his lab appt yesterday due to fatigue, so he was scheduled for labs today instead. Will will proceed with therapy today and schedule him for labs in 2 weeks and another tentative appt in 4 weeks for another cycle. Have informed pt and his spouse to cancel if rheum changes his therapy.        Interval History:    Patient hospitalized for altered mental status and found to have UTI and fever 103 F.  Patient reports completing and antibiotic and feeling better.  Now in skilled nursing facility.   He feels back to baseline.   They are interested in switching to  "Rituxan from Cytoxan.   This morning patient and wife vomited once both ate fruit from the cafeteria.    Review of Systems   Constitutional: Positive for fatigue.   HENT: Positive for drooling.    Eyes: Negative.    Respiratory: Negative.    Cardiovascular: Negative.  Negative for chest pain.   Gastrointestinal: Positive for diarrhea.   Endocrine: Negative.    Genitourinary: Negative.    Musculoskeletal: Positive for arthralgias and neck pain (intermittent). Negative for back pain and gait problem.   Skin: Negative.    Neurological: Positive for headaches.   Psychiatric/Behavioral: Decreased concentration:                    Objective:   BP (!) 146/84   Pulse 68   Ht 5' 10.5" (1.791 m)   BMI 30.90 kg/m²      Physical Exam   Constitutional: He is oriented to person, place, and time and well-developed, well-nourished, and in no distress.   HENT:   Head: Normocephalic and atraumatic.   Eyes: Conjunctivae and EOM are normal.   Neck: Neck supple.   Cardiovascular: Normal rate, regular rhythm and normal heart sounds.    Pulmonary/Chest: Effort normal and breath sounds normal.   Abdominal: Soft. Bowel sounds are normal.   Neurological: He is alert and oriented to person, place, and time.   Sitting in wheelchair   Skin: Skin is warm and dry.     Psychiatric: Mood and affect normal.   Musculoskeletal: He exhibits no edema, tenderness or deformity.              LABS    Component      Latest Ref Rng & Units 10/16/2019   WBC      3.90 - 12.70 K/uL 7.14   RBC      4.60 - 6.20 M/uL 3.96 (L)   Hemoglobin      14.0 - 18.0 g/dL 11.3 (L)   Hematocrit      40.0 - 54.0 % 33.9 (L)   MCV      82 - 98 fL 86   MCH      27.0 - 31.0 pg 28.5   MCHC      32.0 - 36.0 g/dL 33.3   RDW      11.5 - 14.5 % 15.1 (H)   Platelets      150 - 350 K/uL 140 (L)   MPV      9.2 - 12.9 fL 9.3   Immature Granulocytes      0.0 - 0.5 % 0.4   Gran # (ANC)      1.8 - 7.7 K/uL 4.9   Immature Grans (Abs)      0.00 - 0.04 K/uL 0.03   Lymph #      1.0 - 4.8 K/uL " 1.3   Mono #      0.3 - 1.0 K/uL 0.7   Eos #      0.0 - 0.5 K/uL 0.1   Baso #      0.00 - 0.20 K/uL 0.03   nRBC      0 /100 WBC 0   Gran%      38.0 - 73.0 % 68.8   Lymph%      18.0 - 48.0 % 18.3   Mono%      4.0 - 15.0 % 10.4   Eosinophil%      0.0 - 8.0 % 1.7   Basophil%      0.0 - 1.9 % 0.4   Differential Method       Automated   Sodium      136 - 145 mmol/L 146 (H)   Potassium      3.5 - 5.1 mmol/L 4.3   Chloride      95 - 110 mmol/L 109   CO2      23 - 29 mmol/L 26   Glucose      70 - 110 mg/dL 145 (H)   BUN, Bld      8 - 23 mg/dL 13   Creatinine      0.5 - 1.4 mg/dL 1.3   Calcium      8.7 - 10.5 mg/dL 9.9   PROTEIN TOTAL      6.0 - 8.4 g/dL 7.5   Albumin      3.5 - 5.2 g/dL 4.2   BILIRUBIN TOTAL      0.1 - 1.0 mg/dL 0.8   Alkaline Phosphatase      55 - 135 U/L 82   AST      10 - 40 U/L 21   ALT      10 - 44 U/L 16   Anion Gap      8 - 16 mmol/L 11   eGFR if African American      >60 mL/min/1.73 m:2 >60.0   eGFR if non African American      >60 mL/min/1.73 m:2 58.9 (A)   Group & Rh       O POS   INDIRECT ALBERTO       NEG     Assessment:       1.  CNS vasculitis  2.  Immunosupprsession.  TPMT normal metabolizer in 8/2018 but failed per chart.    3.  Diabetes  4.  Compliance.  He hides his medicine some times  Plan:       1.  Labs  2.  Consent for Rituximab reviewed with patient and wife and signed by patient's wife. Rituxan orders placed..   Await approval.   3.  Had Flu vaccination  4. Follow with psychology to deal with stress of disease.      RTO 2 months/prn

## 2019-12-17 LAB
POCT GLUCOSE: 143 MG/DL (ref 70–110)
POCT GLUCOSE: 97 MG/DL (ref 70–110)

## 2019-12-17 PROCEDURE — 11000004 HC SNF PRIVATE

## 2019-12-17 PROCEDURE — 92507 TX SP LANG VOICE COMM INDIV: CPT

## 2019-12-17 PROCEDURE — 97110 THERAPEUTIC EXERCISES: CPT

## 2019-12-17 PROCEDURE — 97535 SELF CARE MNGMENT TRAINING: CPT

## 2019-12-17 PROCEDURE — 97116 GAIT TRAINING THERAPY: CPT

## 2019-12-17 PROCEDURE — 25000003 PHARM REV CODE 250: Performed by: INTERNAL MEDICINE

## 2019-12-17 PROCEDURE — 25000003 PHARM REV CODE 250: Performed by: NURSE PRACTITIONER

## 2019-12-17 PROCEDURE — 97530 THERAPEUTIC ACTIVITIES: CPT

## 2019-12-17 RX ORDER — INSULIN GLARGINE 100 [IU]/ML
25 INJECTION, SOLUTION SUBCUTANEOUS DAILY
Qty: 2 BOX | Refills: 11 | Status: SHIPPED | OUTPATIENT
Start: 2019-12-17 | End: 2020-08-05 | Stop reason: SDUPTHER

## 2019-12-17 RX ORDER — INSULIN ASPART 100 [IU]/ML
1-10 INJECTION, SOLUTION INTRAVENOUS; SUBCUTANEOUS
Qty: 3 ML | Refills: 0 | Status: SHIPPED | OUTPATIENT
Start: 2019-12-17 | End: 2020-08-05 | Stop reason: SDUPTHER

## 2019-12-17 RX ADMIN — METHYLPHENIDATE HYDROCHLORIDE 10 MG: 10 TABLET ORAL at 05:12

## 2019-12-17 RX ADMIN — Medication 400 MG: at 08:12

## 2019-12-17 RX ADMIN — QUETIAPINE FUMARATE 25 MG: 25 TABLET ORAL at 09:12

## 2019-12-17 RX ADMIN — ASPIRIN 81 MG: 81 TABLET, DELAYED RELEASE ORAL at 08:12

## 2019-12-17 RX ADMIN — Medication 400 MG: at 09:12

## 2019-12-17 RX ADMIN — ATORVASTATIN CALCIUM 40 MG: 20 TABLET, FILM COATED ORAL at 08:12

## 2019-12-17 RX ADMIN — METFORMIN HYDROCHLORIDE 1000 MG: 500 TABLET, FILM COATED ORAL at 08:12

## 2019-12-17 RX ADMIN — METFORMIN HYDROCHLORIDE 1000 MG: 500 TABLET, FILM COATED ORAL at 05:12

## 2019-12-17 RX ADMIN — SERTRALINE HYDROCHLORIDE 150 MG: 50 TABLET ORAL at 08:12

## 2019-12-17 RX ADMIN — MICONAZOLE NITRATE: 20 POWDER TOPICAL at 09:12

## 2019-12-17 RX ADMIN — DILTIAZEM HYDROCHLORIDE 240 MG: 120 CAPSULE, COATED, EXTENDED RELEASE ORAL at 08:12

## 2019-12-17 RX ADMIN — INSULIN DETEMIR 24 UNITS: 100 INJECTION, SOLUTION SUBCUTANEOUS at 08:12

## 2019-12-17 RX ADMIN — Medication 5000 UNITS: at 08:12

## 2019-12-17 RX ADMIN — METHYLPHENIDATE HYDROCHLORIDE 10 MG: 10 TABLET ORAL at 08:12

## 2019-12-17 RX ADMIN — IRBESARTAN 300 MG: 75 TABLET ORAL at 09:12

## 2019-12-17 RX ADMIN — DONEPEZIL HYDROCHLORIDE 5 MG: 5 TABLET, FILM COATED ORAL at 09:12

## 2019-12-17 RX ADMIN — ATENOLOL 50 MG: 25 TABLET ORAL at 08:12

## 2019-12-17 NOTE — PT/OT/SLP PROGRESS
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  I am doing well today    Pain/Comfort  Pain Rating 1: 0/10  Pain Rating Post-Intervention 1: 0/10    Objective:   Pt. Supine on arrival    Occupational Performance:    Bed Mobility:    · Patient completed Supine to Sit with modified independence  · Patient completed Sit to Supine with modified independence     Functional Mobility/Transfers:  · Patient completed Sit <> Stand Transfer with stand by assistance and contact guard assistance  with  no assistive device   · Patient completed Bed <> Chair Transfer using Stand Pivot technique with stand by assistance with no assistive device      Activities of Daily Living:  · Upper Body Dressing: supervision to eugenia/doff jacket  · Lower Body Dressing: stand by assistance to eugenia/doff pants seated and to manage over hips    AMPAC 6 Click:  AMPAC Total Score: 18    OT Exercises: UE Ergometer 10 min    Additional Treatment:  Pt. With  L;ateral side stepping activity along counter with standing act on this day with task. Pt. With CGA/SBA for balance aspects with task with out  AD at raised counter Pt with visual perception task with discrimination of various shapes and sizes x 9  min with standing bal and min cues through out with weight shifting and use of BUE's incorporated and crossing mid line and facilitation with posture in prep for home management .     Pt. With 3# dowel activity with 2x20 reps with  shd flex, bicep curls horz adb/add and forward flex motion to increase BUE ROM and strength,.    Pt. With standing and therex performed to increase ROM, endurance selfcare task and fxl mobility for independence   Patient left supine with all lines intact and call button in reach    ASSESSMENT:  Bessy Nunez is a 59 y.o. male with a medical diagnosis of Sepsis due to urinary tract infection Pt. participated well with session on this day.Pt. With cues with step by step instruction with task on this day. Does requires for initiation during task. Pt demos physical deficits with balance  functional mobility, UB strength, endurance  level of functional indep with daily tasks and activities and selfcare skills .Pt. Will continue to benefit from continued OT to progress towards goals      Rehab identified problem list/impairments: impaired endurance, impaired functional mobilty, impaired self care skills, gait instability, impaired balance, edema, decreased safety awareness    Rehab potential is fair    Activity tolerance: Fair    Discharge recommendations: home health OT(with S)     Barriers to discharge: None     Equipment recommendations: bedside commode, walker, rolling     GOALS:   Multidisciplinary Problems     Occupational Therapy Goals        Problem: Occupational Therapy Goal    Goal Priority Disciplines Outcome Interventions   Occupational Therapy Goal     OT, PT/OT Ongoing, Progressing    Description:  Goals to be met by: 12-18-19     Patient will increase functional independence with ADLs by performing:    UE Dressing with Set-up Assistance.Met 12/11/19  LE Dressing with Supervision  Grooming while standing with Supervision.  Toileting from toilet with Supervision for hygiene and clothing management.   Bathing from  shower chair/bench with Stand-by Assistance.  Supine to sit with Modified [place].  Stand pivot transfers with Supervision with RW  Toilet transfer to toilet with Supervision with RW  Pt. To engage in standing task x 8 minutes with S  Pt. To identify 3 leisure activities to engage in  weekly   Pt. To be I with HEP to improve level of endurance                       Plan:  Patient to be seen 5 x/week to address the above listed problems via self-care/home management, therapeutic activities, therapeutic exercises  Plan of Care expires: 01/09/20  Plan of Care reviewed with: patient    BONITA Parkinson  12/17/2019

## 2019-12-17 NOTE — PLAN OF CARE
Pt has met 8/9 goals and is appropriate for D/C home w/ home health and family SPV for safety.  Sandra Donaldson, PT  2019    Problem: Physical Therapy Goal  Goal: Physical Therapy Goal  Description  Goals to be met by: 19     Patient will increase functional independence with mobility by performin. Supine to sit with Modified [place]- Met 2019  2. Sit to supine with Modified [place]- Met 2019  3. Sit to stand transfer with Modified [place] met 2019  4. Bed to chair transfer with Modified [place] w/ or w/o AD- Met 2019  5. Gait  x 150ft feet with Supervision using rollator met 2019  6. Gait x 100ft w/ SPV w/o AD- not met  7. Ascend/Descend 4 inch curb step with Stand-by Assistance using rollator or RW- Met 2019  8. Stand for 5 minutes with Supervision w/ or w/o AD while performing activity met 2019  9. Pt will improve 5 times sit<>stand test time from 27.45s to 25s to decreased fall risk and improve functional mobility- Met 2019           Outcome: Adequate for Care Transition

## 2019-12-17 NOTE — PT/OT/SLP PROGRESS
"Speech Language Pathology  Treatment    Bessy Nunez   MRN: [id number]   Admitting Diagnosis: Sepsis due to urinary tract infection    Diet recommendations: Solid Diet Level: Regular  Liquid Diet Level: Thin Standard aspiration precautions    SLP Treatment Date: 12/17/19  Speech Start Time: 1013     Speech Stop Time: 1047     Speech Total (min): 34 min       TREATMENT BILLABLE MINUTES:  Speech Therapy Individual 26 and [place]eld Care/Home Management Training 8    Has the patient been evaluated by SLP for swallowing? : Yes  Keep patient NPO?: No   General Precautions: Standard, aspiration, diabetic, fall  Current Respiratory Status: room air       Subjective:  "It's a little small." pt commented regarding his writing.  Pt and wife both agree pt's writing is smaller than usual.     Objective:      Pt was oriented to month, year, and place, but only partially oriented to situation/reason for admission.  Pt recalled 3/3 words after a 3 minute delay given supervision to utilize compensatory strategies.  Mental manipulation tasks recalling largest double digit in fo3 were completed with 100% acc.  For fo4, pt achieved 80% accuracy ind'ly/100% given cues.  Pt recalled facts from a paragraph read aloud by SLP with 30% accuracy ind'ly/100% given cues.  Pt compared/contrasted 2 given items with 100% accuracy.  Pt stated primary and alternative uses for common objects with 85% accuracy ind'yl/100% given cues.  Pt used right/dominant hand to write name and address.  Legibility impacted by small writing, reported by pt/wife to be different than baseline handwriting.  Pt exhibited impaired attention and planning when filling in numbers on the face of a clock, placing numbers 1-24 on the face of the clock.  Education provided to pt regarding purpose of clock drawing assessment, sustained attention, visual spatial attention, and recommendations for SLP eval in home setting upon d/c. Understanding and agreement were expressed. Pt's wife " states pt has not yet returned to cognitive baseline from prior to current hospitalization.     Assessment:  Bessy Nunez is a 59 y.o. male with a medical diagnosis of Sepsis due to urinary tract infection and presents with cognitive-linguistic deficits.     Discharge recommendations: Discharge Facility/Level of Care Needs: home health speech therapy     Goals:   Multidisciplinary Problems     SLP Goals        Problem: SLP Goal    Goal Priority Disciplines Outcome   SLP Goal     SLP Ongoing, Progressing   Description:  Speech Language Pathology Goals  Goals expected to be met by 12/24:  1. Pt will tolerate a regular consistency diet and thin liquids without s/s of aspiration.   2. Pt will recall 3/3 words or facts after a 3 minute delay given modified independence.  3. Pt will answer complex yes/no q's with 90% accuracy.   4. Pt will follow 3-step commands with 90% accuracy.  5. Pt will complete problem solving tasks with 80% accuracy.  6. Pt will list 8 items in a category within one minute given min-mod cues.  7. Pt will complete sustained attention tasks with 70% accuracy given mod cues.     Goals expected to be met by 12/17:  1. Pt will tolerate a regular consistency diet and thin liquids without s/s of aspiration. ongoing  2. Pt will recall 3/3 words or facts after a 3 minute delay given supervision. Goal met  3. Pt will answer complex yes/no q's with 80% accuracy.  Goal met  4. Pt will follow 3-step commands with 70% accuracy given moderate cues. Goal met  5. Pt will complete problem solving tasks with 80% accuracy given min-mod cues.  Goal met  6. Pt will list 8 items in a category within one minute given min-mod cues. onoging  7. Pt will participate in further assessment of reading, writing, and visual spatial abilities to determine need for further intervention. Goal met. Attention deficits evident.                           Plan:   Patient to be seen Therapy Frequency: 3 x/week  Planned Interventions:  Cognitive-Linguistic Therapy, Dysphagia Therapy  Plan of Care expires: 01/09/20  Plan of Care reviewed with: patient, spouse  SLP Follow-up?: Yes              JULIO Mckeon, CCC-SLP  12/17/2019     JULIO Mckeon, CCC-SLP  Speech Language Pathologist  [phone]  12/17/2019

## 2019-12-17 NOTE — PLAN OF CARE
[facility]    HOME HEALTH ORDERS  FACE TO FACE ENCOUNTER    Patient Name: Bessy Nunez  YOB: 1960    PCP: Edgar Nova MD   PCP Address: [address] / [place] [id number]  PCP Phone Number: [phone]  PCP Fax: [phone]    Encounter Date: 12/17/2019    Admit to Home Health    Diagnoses:  Active Hospital Problems    Diagnosis  POA    *Sepsis due to urinary tract infection [A41.9, N39.0]  Yes    Severe sepsis [A41.9, R65.20]  Yes    Anemia associated with chemotherapy [D64.81, T45.1X5A]  Yes    Chemotherapy-induced thrombocytopenia [D69.59, T45.1X5A]  Yes    MANISH (acute kidney injury) [N17.9]  Yes    Dementia with behavioral disturbance [F03.91]  Yes    Osteoporosis with current pathological fracture with routine healing from steroids; compression fx 2017; 4/2019 bisphosphonate [M80.00XD]  Not Applicable    CNS vasculitis failed imuran; on cytoxan [I77.6]  Yes     Failed Cytoxan hiatus and transition to Imuran Sept/Oct 2018;   Now much improved back on Cytoxan;   Refer to Dr. Daley of rheumatology for second opinion on management      Encephalopathy [G93.40]  Yes    Type 2 diabetes mellitus with diabetic polyneuropathy, with long-term current use of insulin [E11.42, Z79.4]  Not Applicable    NAFLD (nonalcoholic fatty liver disease) 6/12/2015 liver biopsy showing advanced stage fibrosis with bridging fibrosis and scattered nodules. [K76.0]  Yes     6/12/2015 liver biopsy showing advanced stage fibrosis with bridging fibrosis and scattered nodules.      Obesity (BMI 30-39.9) [E66.9]  Yes    Mixed hyperlipidemia [E78.2]  Yes    Essential hypertension 5/2016 TTE diastolic dysfunction [I10]  Yes     5/11/2016 TTE:   1 - Mildly depressed left ventricular systolic function (EF 45-50%).  Mild global hypokinesis at rest. 2 - Eccentric hypertrophy. 3 - Left ventricular diastolic dysfunction.        Resolved Hospital Problems   No resolved problems to display.        Future Appointments   Date Time Provider Department Center   12/19/2019  1:00 PM Edgar Nova MD [facility] FAM MED [place]   12/23/2019  9:15 AM Sherrie Lu DPM [facility] POD [place]   12/23/2019 11[facility] [address]   12/24/2019  9:30 AM LAB, HEMONC SAME DAY [facility] LAB [facility] [place]   12/24/2019 10:30 AM Lobito Goldstein MD [facility] [place]   12/24/2019 11:00 AM [facility], CHEMO [facility] [place]   1/6/2020  1:30 PM Beti Boswell, APRN, CNS [facility] MSC [facility]   2/5/2020 10:00 AM Kay Troy MD [facility] RHEUM [facility]   2/7/2020  1:00 PM DC, VISUAL FIELDS [facility] OPHTHAL [facility]   2/7/2020  1:30 PM Lobito Willson MD [facility] OPHTHAL [facility]     Follow-up Information     Schedule an appointment as soon as possible for a visit with Edgar Nova MD.    Specialty:  Internal Medicine  Why:  WITHIN 1-2 WEEKS  Contact information:  [address]  [place] [address] [phone]                   I have seen and examined this patient face to face today. My clinical findings that support the need for the home health skilled services and home bound status are the following:  Weakness/numbness causing balance and gait disturbance due to Infection making it taxing to leave home.    Allergies:Review of patient's allergies indicates:  No Known Allergies    Diet: diabetic diet: 2000 calorie    Activities: activity as tolerated    Nursing:   SN to complete comprehensive assessment including routine vital signs. Instruct on disease process and s/s of complications to report to MD. Review/verify medication list sent home with the patient at time of discharge  and instruct patient/caregiver as needed. Frequency may be adjusted depending on start of care date.    Notify MD if SBP > 160 or < 90; DBP > 90 or < 50; HR > 120 or < 50; Temp > 101      CONSULTS:    Physical Therapy to evaluate and treat. Evaluate for home safety and equipment needs; Establish/upgrade home  exercise program. Perform / instruct on therapeutic exercises, gait training, transfer training, and Range of Motion.  Occupational Therapy to evaluate and treat. Evaluate home environment for safety and equipment needs. Perform/Instruct on transfers, ADL training, ROM, and therapeutic exercises.  Speech Therapy  to evaluate and treat for  Language, Swallowing and Cognition.  Aide to provide assistance with personal care, ADLs, and vital signs.    MISCELLANEOUS CARE:  Diabetic Care:   SN to perform and educate Diabetic management with blood glucose monitoring:, Fingerstick blood sugar AC and HS and Report CBG < 60 or > 350 to physician.      Medications: Review discharge medications with patient and family and provide education.      I certify that this patient is confined to his home and needs intermittent skilled nursing care, physical therapy, speech therapy and occupational therapy.

## 2019-12-17 NOTE — PLAN OF CARE
Problem: Diabetes Comorbidity  Goal: Blood Glucose Level Within Desired Range  Outcome: Ongoing, Progressing     Problem: Fall Injury Risk  Goal: Absence of Fall and Fall-Related Injury  Outcome: Ongoing, Progressing     Problem: Skin Injury Risk Increased  Goal: Skin Health and Integrity  Outcome: Ongoing, Progressing

## 2019-12-17 NOTE — PLAN OF CARE
Problem: SLP Goal  Goal: SLP Goal  Description  Speech Language Pathology Goals  Goals expected to be met by 12/24:  1. Pt will tolerate a regular consistency diet and thin liquids without s/s of aspiration.   2. Pt will recall 3/3 words or facts after a 3 minute delay given modified independence.  3. Pt will answer complex yes/no q's with 90% accuracy.   4. Pt will follow 3-step commands with 90% accuracy.  5. Pt will complete problem solving tasks with 80% accuracy.  6. Pt will list 8 items in a category within one minute given min-mod cues.  7. Pt will complete sustained attention tasks with 70% accuracy given mod cues.     Goals expected to be met by 12/17:  1. Pt will tolerate a regular consistency diet and thin liquids without s/s of aspiration. ongoing  2. Pt will recall 3/3 words or facts after a 3 minute delay given supervision. Goal met  3. Pt will answer complex yes/no q's with 80% accuracy.  Goal met  4. Pt will follow 3-step commands with 70% accuracy given moderate cues. Goal met  5. Pt will complete problem solving tasks with 80% accuracy given min-mod cues.  Goal met  6. Pt will list 8 items in a category within one minute given min-mod cues. onoging  7. Pt will participate in further assessment of reading, writing, and visual spatial abilities to determine need for further intervention. Goal met. Attention deficits evident.         Outcome: Ongoing, Progressing  Goals reviewed and revised. Pt is scheduled to discharge to home tomorrow. Recommending SLP eval in home setting. Pt's wife states pt has not returned to baseline for cognition.   JULIO Mckeon, CCC-SLP  Speech Language Pathologist  [phone]  12/17/2019

## 2019-12-17 NOTE — TREATMENT PLAN
Rehab Services' DME recommendations    Bessy Nunez  MRN: [id number]    [x] 3 in 1 [facility] Standard    [x] Home health PT, OT and SLP    Sandra Donaldson, PT 12/17/2019

## 2019-12-17 NOTE — PT/OT/SLP PROGRESS
"Physical Therapy  Treatment/Family Training/Discharge Summary    Bessy Nunez   MRN: [id number]   Admitting Diagnosis: Sepsis due to urinary tract infection    PT Received On: 12/17/19        Billable Minutes:  Gait Training 15, Therapeutic Activity 13, Therapeutic Exercise 10 and Total Time 38    Treatment Type: Treatment  PT/PTA: PT     PTA Visit Number: 0       General Precautions: Standard, aspiration, fall, diabetic  Orthopedic Precautions: N/A   Braces: N/A    Spiritual, Cultural Beliefs, Episcopal Practices, Values that Affect Care: no    Subjective:  Communicated with patient prior to session.  Pt and wife agreeable to family training session.    Pain/Comfort  Pain Rating 1: 0/10    Objective:  Patient found sitting EOB. Wife present t/o session.       AM-PAC 6 CLICK MOBILITY  Total Score:22    Bed Mobility:  Sit>Supine:on mat Pankaj  Supine>Sit: on mat Pankaj  Both via logroll/sidelying technique    Transfers:  Sit<>Stand: to/from EOB, w/c, and rollator seat; transfers w/ and w/o rollator; all Pankaj  Stand Pivot Transfer: EOB>w/c and w/c<>EOM; all w/o AD Pankaj  Simulated car transfer: w/c<>forward facing nustep w/o AD w/ SPV  Pt properly uses rollator by locking brakes prior to transfers    Gait:  Amb multiple trials  x240ft w/ rollator and SPV  x100ft (x2 trials) w/o AD w/ SBA for safety, mild instability noted     Advanced Gait:  Curb Step: asc/arlette 4" curb w/ rollator and SBA for safety  Cues for technique    Additional Treatment:  Recumbent cross , Level 4, x15min to inc overall strength/endurance    Five Times Sit<>Stand time = 20.34s w/o use of BUE    Pt's family completed family training. They verb and demo understanding on how to provide appropriate assistance/guarding for the following tasks: bed mobility, transfers, ambulation, curb step, and car transfer. They were also informed on DME recommendations (bedside commode) and that SPV is recommended for safety upon D/C. Pt's family verb understanding. "       Patient left up in chair with call button in reach and wife present.    Assessment:  Bessy Nunez is a 59 y.o. male with a medical diagnosis of Sepsis due to urinary tract infection.  Pt sara session well w/ good participation. He has made good progress overall w/ functional mobility and has met the majority of his goals. He is appropriate for D/C from SNF at this time w/ SPV for safety.    Rehab identified problem list/impairments: impaired self care skills, impaired functional mobilty, gait instability, impaired balance    Rehab potential is good.    Activity tolerance: Good    Discharge recommendations: home with home health(w/ SPV from wife)     Barriers to discharge: None    Equipment recommendations: bedside commode     GOALS:   Multidisciplinary Problems     Physical Therapy Goals        Problem: Physical Therapy Goal    Goal Priority Disciplines Outcome Goal Variances Interventions   Physical Therapy Goal     PT, PT/OT Adequate for Care Transition     Description:  Goals to be met by: 19     Patient will increase functional independence with mobility by performin. Supine to sit with Modified [place]- Met 2019  2. Sit to supine with Modified [place]- Met 2019  3. Sit to stand transfer with Modified [place] met 2019  4. Bed to chair transfer with Modified [place] w/ or w/o AD- Met 2019  5. Gait  x 150ft feet with Supervision using rollator met 2019  6. Gait x 100ft w/ SPV w/o AD- not met  7. Ascend/Descend 4 inch curb step with Stand-by Assistance using rollator or RW- Met 2019  8. Stand for 5 minutes with Supervision w/ or w/o AD while performing activity met 2019  9. Pt will improve 5 times sit<>stand test time from 27.45s to 25s to decreased fall risk and improve functional mobility- Met 2019                            PLAN:    D/C home w/ home health and family SPV for safety.  Plan of Care expires: 20  Plan of  Care reviewed with: patient    Sandra ESCAMILLA Mendoza, PT  12/17/2019

## 2019-12-18 VITALS
HEART RATE: 67 BPM | DIASTOLIC BLOOD PRESSURE: 66 MMHG | RESPIRATION RATE: 18 BRPM | HEIGHT: 71 IN | OXYGEN SATURATION: 96 % | BODY MASS INDEX: 30.59 KG/M2 | TEMPERATURE: 97 F | SYSTOLIC BLOOD PRESSURE: 134 MMHG | WEIGHT: 218.5 LBS

## 2019-12-18 LAB
POCT GLUCOSE: 74 MG/DL (ref 70–110)
POCT GLUCOSE: 83 MG/DL (ref 70–110)
POCT GLUCOSE: 91 MG/DL (ref 70–110)

## 2019-12-18 PROCEDURE — 25000003 PHARM REV CODE 250: Performed by: INTERNAL MEDICINE

## 2019-12-18 PROCEDURE — 25000003 PHARM REV CODE 250: Performed by: NURSE PRACTITIONER

## 2019-12-18 RX ADMIN — DILTIAZEM HYDROCHLORIDE 240 MG: 120 CAPSULE, COATED, EXTENDED RELEASE ORAL at 09:12

## 2019-12-18 RX ADMIN — ATENOLOL 50 MG: 25 TABLET ORAL at 09:12

## 2019-12-18 RX ADMIN — INSULIN DETEMIR 24 UNITS: 100 INJECTION, SOLUTION SUBCUTANEOUS at 09:12

## 2019-12-18 RX ADMIN — SENNOSIDES AND DOCUSATE SODIUM 1 TABLET: 8.6; 5 TABLET ORAL at 09:12

## 2019-12-18 RX ADMIN — ATORVASTATIN CALCIUM 40 MG: 20 TABLET, FILM COATED ORAL at 09:12

## 2019-12-18 RX ADMIN — SERTRALINE HYDROCHLORIDE 150 MG: 50 TABLET ORAL at 09:12

## 2019-12-18 RX ADMIN — Medication 5000 UNITS: at 09:12

## 2019-12-18 RX ADMIN — METFORMIN HYDROCHLORIDE 1000 MG: 500 TABLET, FILM COATED ORAL at 09:12

## 2019-12-18 RX ADMIN — METHYLPHENIDATE HYDROCHLORIDE 10 MG: 10 TABLET ORAL at 09:12

## 2019-12-18 RX ADMIN — ASPIRIN 81 MG: 81 TABLET, DELAYED RELEASE ORAL at 09:12

## 2019-12-18 RX ADMIN — POLYETHYLENE GLYCOL 3350 17 G: 17 POWDER, FOR SOLUTION ORAL at 09:12

## 2019-12-18 NOTE — PROGRESS NOTES
Discharge insrtuctions  Given per nurse. Patient discharged to home via wheelchair per PCT accompanied by wife.

## 2019-12-18 NOTE — PROGRESS NOTES
Patient discharge completed. Pt home health orders have been sent to [facility]. Patient receives no DME. Patients nurse has been informed pt ready for discharge. Family is in room.

## 2019-12-18 NOTE — HPI
Mr. Nunez is 58 year old male with PMHx of primary CNS Vasculitis (on cytoxan monthly), DM2, and Hx of CVA who presents to SNF following a hospitalization for urosepsis with UTI.  Admission to SNF for secondary weakness and debility.     Patient had originally presented to Neurology clinic on 12/3 for usual follow-up appointment and while there his wife reported that the patient had been acting strangely over the last week with refusing baths, hiding his medication, and urinating on himself frequently. The patient has some cognitive dysfunction from his CNS vasculitis at baseline, but the patient usually is competent and compliant with his grooming and medications.       Patient was found to have E coli UTI with severe sepsis. He developed convulsions during his stay. Neurology evaluated patient. MRI brain did not show acute process and EEG was unremarkable for seizure. Convulsions were thought to be from septic encephalopathy. Patient's mental status returned to baseline. He completed a 7 day course of antibiotics.      Today, patient reports that his last bowel movement was on 12/08.  Patient reports passing flatus and has active bowel sounds throughout all 4 quadrants.  He denies any abdominal discomfort.  Patient states that he normally goes every 1-2 days.  Patient denies any other complaints at this time.     Patient will be treated at [facility] with PT and OT to improve functional status and ability to perform ADLs.

## 2019-12-18 NOTE — HOSPITAL COURSE
Patient progressed well with PT and OT. Patient had no significant events during their stay at SNF. Home health was set up. DME was ordered if needed. Follow up appointment to be made by patient within one week. All prescriptions and discharge instructions were ordered to be given to the patient prior to discharge.     PEx  Constitutional: Patient appears well-developed and in no distress   HENT:   Head: Normocephalic and atraumatic.   Eyes: Pupils are equal, round  Neck: Normal range of motion. Neck supple.   Cardiovascular: Normal rate, regular rhythm and normal heart sounds.    Pulmonary/Chest: Effort normal and breath sounds are clear  Abdominal:  Protuberant, Soft. Bowel sounds are normal.   Musculoskeletal: Normal range of motion.   Neurological: Alert and oriented to person, place, and time.  No focal weakness noted. Impaired higher level thinking  Psychiatric:  Flat affect. Behavior is normal.   Skin: Skin is warm and dry. Moisture associated dermatitis noted to groin area

## 2019-12-18 NOTE — DISCHARGE SUMMARY
[facility] [place] [facility] - Skilled Nursing [place] Medicine  Discharge Summary      Patient Name: Bessy Nunez  MRN: [id number]  Admission Date: 12/9/2019  Hospital Length of Stay: 9 days  Discharge Date and Time:  12/18/2019 5:44 PM  Attending Physician: No att. providers found   Discharging Provider: Ebonie Gonsalves NP  Primary Care Provider: Edgar Nova MD      HPI:   Mr. Nunez is 58 year old male with PMHx of primary CNS Vasculitis (on cytoxan monthly), DM2, and Hx of CVA who presents to SNF following a hospitalization for urosepsis with UTI.  Admission to SNF for secondary weakness and debility.     Patient had originally presented to Neurology clinic on 12/3 for usual follow-up appointment and while there his wife reported that the patient had been acting strangely over the last week with refusing baths, hiding his medication, and urinating on himself frequently. The patient has some cognitive dysfunction from his CNS vasculitis at baseline, but the patient usually is competent and compliant with his grooming and medications.       Patient was found to have E coli UTI with severe sepsis. He developed convulsions during his stay. Neurology evaluated patient. MRI brain did not show acute process and EEG was unremarkable for seizure. Convulsions were thought to be from septic encephalopathy. Patient's mental status returned to baseline. He completed a 7 day course of antibiotics.      Today, patient reports that his last bowel movement was on 12/08.  Patient reports passing flatus and has active bowel sounds throughout all 4 quadrants.  He denies any abdominal discomfort.  Patient states that he normally goes every 1-2 days.  Patient denies any other complaints at this time.     Patient will be treated at [facility] with PT and OT to improve functional status and ability to perform ADLs.     * No surgery found *      Hospital Course:   Patient progressed well with PT and OT. Patient had no significant events during  their stay at SNF. Home health was set up. DME was ordered if needed. Follow up appointment to be made by patient within one week. All prescriptions and discharge instructions were ordered to be given to the patient prior to discharge.     PEx  Constitutional: Patient appears well-developed and in no distress   HENT:   Head: Normocephalic and atraumatic.   Eyes: Pupils are equal, round  Neck: Normal range of motion. Neck supple.   Cardiovascular: Normal rate, regular rhythm and normal heart sounds.    Pulmonary/Chest: Effort normal and breath sounds are clear  Abdominal:  Protuberant, Soft. Bowel sounds are normal.   Musculoskeletal: Normal range of motion.   Neurological: Alert and oriented to person, place, and time.  No focal weakness noted. Impaired higher level thinking  Psychiatric:  Flat affect. Behavior is normal.   Skin: Skin is warm and dry. Moisture associated dermatitis noted to groin area        Consults:   Consults (From admission, onward)        Status Ordering Provider     Inpatient consult to Registered Dietitian/Nutritionist  Once     Provider:  (Not yet assigned)    EFFIE Roper          No new Assessment & Plan notes have been filed under this hospital service since the last note was generated.  Service: Hospital Medicine    Final Active Diagnoses:    Diagnosis Date Noted POA    PRINCIPAL PROBLEM:  Sepsis due to urinary tract infection [A41.9, N39.0] 12/09/2019 Yes    Severe sepsis [A41.9, R65.20] 12/03/2019 Yes    Anemia associated with chemotherapy [D64.81, T45.1X5A] 07/18/2019 Yes    Chemotherapy-induced thrombocytopenia [D69.59, T45.1X5A] 07/18/2019 Yes    MANISH (acute kidney injury) [N17.9] 06/21/2019 Yes    Dementia with behavioral disturbance [F03.91] 04/30/2019 Yes    Osteoporosis with current pathological fracture with routine healing from steroids; compression fx 2017; 4/2019 bisphosphonate [M80.00XD] 04/10/2019 Not Applicable    CNS vasculitis failed  "imuran; on cytoxan [I77.6] 06/02/2017 Yes    Encephalopathy [G93.40] 05/26/2017 Yes    Type 2 diabetes mellitus with diabetic polyneuropathy, with long-term current use of insulin [E11.42, Z79.4] 05/14/2017 Not Applicable    NAFLD (nonalcoholic fatty liver disease) 6/12/2015 liver biopsy showing advanced stage fibrosis with bridging fibrosis and scattered nodules. [K76.0] 10/11/2013 Yes    Obesity (BMI 30-39.9) [E66.9] 10/11/2013 Yes    Mixed hyperlipidemia [E78.2] 11/09/2012 Yes    Essential hypertension 5/2016 TTE diastolic dysfunction [I10] 11/09/2012 Yes      Problems Resolved During this Admission:       Discharged Condition: good    Disposition: Home-Health Care [facility]    Follow Up:  Follow-up Information     Schedule an appointment as soon as possible for a visit with Edgar Nova MD.    Specialty:  Internal Medicine  Why:  WITHIN 1-2 WEEKS  Contact information:  [address] [facility] [address] [phone]             [facility].    Specialty:  DME Provider  Why:  Agency will contact pt to schedule a home health assessment.  Contact information:  [address]  [address]  [facility] [id number] [phone]                 Patient Instructions:      3 IN 1 COMMODE FOR HOME USE     Order Specific Question Answer Comments   Type: Standard    Height: 5' 10.5" (1.791 m)    Weight: 99.1 kg (218 lb 7.6 oz)    Does patient have medical equipment at home? bath bench    Length of need (1-99 months): 99    Vendor: Other (use comments)    Expected Date of Delivery: 12/18/2019      No driving until:   Order Comments: Cleared by PCP     Notify your health care provider if you experience any of the following:  temperature >100.4     Notify your health care provider if you experience any of the following:  persistent nausea and vomiting or diarrhea     Notify your health care provider if you experience any of the following:  severe uncontrolled pain     Notify your health care provider if you experience " any of the following:  redness, tenderness, or signs of infection (pain, swelling, redness, odor or green/yellow discharge around incision site)     Notify your health care provider if you experience any of the following:  difficulty breathing or increased cough     Notify your health care provider if you experience any of the following:  persistent dizziness, light-headedness, or visual disturbances     Notify your health care provider if you experience any of the following:  increased confusion or weakness     Activity as tolerated       Significant Diagnostic Studies: Labs: BMP: No results for input(s): GLU, NA, K, CL, CO2, BUN, CREATININE, CALCIUM, MG in the last 48 hours. and CBC No results for input(s): WBC, HGB, HCT, PLT in the last 48 hours.    Pending Diagnostic Studies:     None         Medications:  Reconciled Home Medications:      Medication List      CHANGE how you take these medications    insulin aspart U-100 100 unit/mL (3 mL) Inpn pen  Commonly known as:  NovoLOG  Inject 1-10 Units into the skin before meals and at bedtime as needed (Hyperglycemia).  What changed:    · how much to take  · when to take this  · reasons to take this     insulin glargine 100 units/mL (3mL) SubQ pen  Commonly known as:  Basaglar KwikPen U-100 Insulin  Inject 25 Units into the skin once daily. Up to 60 BID with cytoxan  What changed:    · how much to take  · when to take this        CONTINUE taking these medications    alclomethasone 0.05 % cream  Commonly known as:  ACLOVATE     alendronate 70 MG tablet  Commonly known as:  FOSAMAX  Take 1 tablet (70 mg total) by mouth every 7 days.     aspirin 81 MG EC tablet  Commonly known as:  ECOTRIN  Take 81 mg by mouth once daily.     atenolol 50 MG tablet  Commonly known as:  TENORMIN  TAKE 1 TABLET BY MOUTH ONCE DAILY     atorvastatin 40 MG tablet  Commonly known as:  LIPITOR  TAKE 1 TABLET BY MOUTH ONCE DAILY     blood sugar diagnostic Strp  Commonly known as:  True Metrix  "Glucose Test Strip  Test blood sugar four times a day     DILT- MG Cdcr  Generic drug:  diltiaZEM  TAKE 1 CAPSULE BY MOUTH ONCE DAILY     donepezil 5 MG tablet  Commonly known as:  ARICEPT  Take 1 tablet (5 mg total) by mouth every evening. Start with 1/2 tab daily x 1 week then whole tablet maintenance     flash glucose sensor Kit  Commonly known as:  FreeStyle Patrick 14 Day Sensor  Glucose checking 4 times a day     FreeStyle Patrick 14 Day [place] Misc  Generic drug:  flash glucose scanning reader  GLUCOSE TESTING : FASTING AND PRIOR TO MEALS     insulin syringe-needle U-100 0.5 mL 30 gauge x 5/16" Syrg  Commonly known as:  Insulin Syringe  Use 3x/day     irbesartan 300 MG tablet  Commonly known as:  AVAPRO  TAKE 1 TABLET BY MOUTH ONCE DAILY IN THE EVENING     ketoconazole 2 % cream  Commonly known as:  NIZORAL     liraglutide 0.6 mg/0.1 mL (18 mg/3 mL) subq PNIJ 0.6 mg/0.1 mL (18 mg/3 mL) Pnij  Commonly known as:  Victoza 3-Matt  Inject 1.8 mg into the skin once daily.     metFORMIN 500 MG 24 hr tablet  Commonly known as:  GLUCOPHAGE-XR  Take 2 tablets (1,000 mg total) by mouth 2 (two) times daily with meals.     methylphenidate HCl 10 MG tablet  Commonly known as:  RITALIN  Take 1 tablet (10 mg total) by mouth 2 (two) times daily with meals.     omega-3 fatty acids-vitamin E 1,000 mg Cap  Commonly known as:  Fish Oil  Take 1 capsule by mouth 2 (two) times daily.     pen needle, diabetic 32 gauge x 5/32" Ndle  Commonly known as:  BD Ultra-Fine Geovanna Pen Needle  4x daily insulin pen needle, relion     QUEtiapine 25 MG Tab  Commonly known as:  SEROQUEL  Take 1 tablet (25 mg total) by mouth every evening.     sertraline 100 MG tablet  Commonly known as:  ZOLOFT  TAKE 1 & 1/2 (ONE & ONE-HALF) TABLETS BY MOUTH ONCE DAILY     TRUEplus Lancets 30 gauge Misc  Generic drug:  lancets  Test blood sugar four times a day     vitamin D 1000 units Tab  Commonly known as:  VITAMIN D3  Take 5 tablets (5,000 Units total) by mouth " once daily.            Indwelling Lines/Drains at time of discharge:   Lines/Drains/Airways     Central Venous Catheter Line                 Port A Cath Single Lumen 12/07/18 1219 right internal jugular 376 days                Time spent on the discharge of patient: 37 minutes  Patient was seen and examined on the date of discharge and determined to be suitable for discharge.         Ebonie Gonsalves, NP  Department of Hospital Medicine  [facility] - Skilled Nursing Care

## 2019-12-19 ENCOUNTER — OFFICE VISIT (OUTPATIENT)
Dept: FAMILY MEDICINE | Facility: CLINIC | Age: 59
End: 2019-12-19
Payer: MEDICARE

## 2019-12-19 VITALS
BODY MASS INDEX: 30.49 KG/M2 | TEMPERATURE: 98 F | DIASTOLIC BLOOD PRESSURE: 88 MMHG | WEIGHT: 213 LBS | HEART RATE: 68 BPM | SYSTOLIC BLOOD PRESSURE: 124 MMHG | HEIGHT: 70 IN | OXYGEN SATURATION: 99 %

## 2019-12-19 DIAGNOSIS — I10 ESSENTIAL HYPERTENSION: Chronic | ICD-10-CM

## 2019-12-19 DIAGNOSIS — Z79.4 CONTROLLED TYPE 2 DIABETES MELLITUS WITH DIABETIC NEPHROPATHY, WITH LONG-TERM CURRENT USE OF INSULIN: ICD-10-CM

## 2019-12-19 DIAGNOSIS — R41.89 COGNITIVE IMPAIRMENT: ICD-10-CM

## 2019-12-19 DIAGNOSIS — R53.83 FATIGUE, UNSPECIFIED TYPE: ICD-10-CM

## 2019-12-19 DIAGNOSIS — E11.21 CONTROLLED TYPE 2 DIABETES MELLITUS WITH DIABETIC NEPHROPATHY, WITH LONG-TERM CURRENT USE OF INSULIN: ICD-10-CM

## 2019-12-19 DIAGNOSIS — M80.80XD OTHER OSTEOPOROSIS WITH CURRENT PATHOLOGICAL FRACTURE WITH ROUTINE HEALING, SUBSEQUENT ENCOUNTER: ICD-10-CM

## 2019-12-19 DIAGNOSIS — Z87.440 HISTORY OF UTI: Primary | ICD-10-CM

## 2019-12-19 DIAGNOSIS — Z79.4 TYPE 2 DIABETES MELLITUS WITH HYPERGLYCEMIA, WITH LONG-TERM CURRENT USE OF INSULIN: ICD-10-CM

## 2019-12-19 DIAGNOSIS — E11.65 TYPE 2 DIABETES MELLITUS WITH HYPERGLYCEMIA, WITH LONG-TERM CURRENT USE OF INSULIN: ICD-10-CM

## 2019-12-19 DIAGNOSIS — R82.90 UNSPECIFIED ABNORMAL FINDINGS IN URINE: ICD-10-CM

## 2019-12-19 DIAGNOSIS — I77.6 CNS VASCULITIS: ICD-10-CM

## 2019-12-19 DIAGNOSIS — F32.5 MAJOR DEPRESSIVE DISORDER WITH SINGLE EPISODE, IN FULL REMISSION: Chronic | ICD-10-CM

## 2019-12-19 DIAGNOSIS — I63.81 LACUNAR STROKE OF LEFT SUBTHALAMIC REGION: ICD-10-CM

## 2019-12-19 PROCEDURE — 3079F DIAST BP 80-89 MM HG: CPT | Mod: CPTII,S$GLB,, | Performed by: INTERNAL MEDICINE

## 2019-12-19 PROCEDURE — 3079F PR MOST RECENT DIASTOLIC BLOOD PRESSURE 80-89 MM HG: ICD-10-PCS | Mod: CPTII,S$GLB,, | Performed by: INTERNAL MEDICINE

## 2019-12-19 PROCEDURE — 99499 RISK ADDL DX/OHS AUDIT: ICD-10-PCS | Mod: S$GLB,,, | Performed by: INTERNAL MEDICINE

## 2019-12-19 PROCEDURE — 3074F SYST BP LT 130 MM HG: CPT | Mod: CPTII,S$GLB,, | Performed by: INTERNAL MEDICINE

## 2019-12-19 PROCEDURE — 3008F PR BODY MASS INDEX (BMI) DOCUMENTED: ICD-10-PCS | Mod: CPTII,S$GLB,, | Performed by: INTERNAL MEDICINE

## 2019-12-19 PROCEDURE — 82962 POCT GLUCOSE, HAND-HELD DEVICE: ICD-10-PCS | Mod: S$GLB,,, | Performed by: INTERNAL MEDICINE

## 2019-12-19 PROCEDURE — 99214 OFFICE O/P EST MOD 30 MIN: CPT | Mod: S$GLB,,, | Performed by: INTERNAL MEDICINE

## 2019-12-19 PROCEDURE — 82962 GLUCOSE BLOOD TEST: CPT | Mod: S$GLB,,, | Performed by: INTERNAL MEDICINE

## 2019-12-19 PROCEDURE — 99999 PR PBB SHADOW E&M-EST. PATIENT-LVL IV: ICD-10-PCS | Mod: PBBFAC,,, | Performed by: INTERNAL MEDICINE

## 2019-12-19 PROCEDURE — 99999 PR PBB SHADOW E&M-EST. PATIENT-LVL IV: CPT | Mod: PBBFAC,,, | Performed by: INTERNAL MEDICINE

## 2019-12-19 PROCEDURE — 99214 PR OFFICE/OUTPT VISIT, EST, LEVL IV, 30-39 MIN: ICD-10-PCS | Mod: S$GLB,,, | Performed by: INTERNAL MEDICINE

## 2019-12-19 PROCEDURE — 3008F BODY MASS INDEX DOCD: CPT | Mod: CPTII,S$GLB,, | Performed by: INTERNAL MEDICINE

## 2019-12-19 PROCEDURE — 99499 UNLISTED E&M SERVICE: CPT | Mod: S$GLB,,, | Performed by: INTERNAL MEDICINE

## 2019-12-19 PROCEDURE — 3074F PR MOST RECENT SYSTOLIC BLOOD PRESSURE < 130 MM HG: ICD-10-PCS | Mod: CPTII,S$GLB,, | Performed by: INTERNAL MEDICINE

## 2019-12-19 RX ORDER — DONEPEZIL HYDROCHLORIDE 5 MG/1
5 TABLET, FILM COATED ORAL NIGHTLY
Qty: 90 TABLET | Refills: 3 | Status: SHIPPED | OUTPATIENT
Start: 2019-12-19 | End: 2020-08-05 | Stop reason: SDUPTHER

## 2019-12-19 RX ORDER — DILTIAZEM HYDROCHLORIDE 240 MG/1
240 CAPSULE, EXTENDED RELEASE ORAL DAILY
Qty: 90 CAPSULE | Refills: 3 | Status: SHIPPED | OUTPATIENT
Start: 2019-12-19 | End: 2020-08-05 | Stop reason: SDUPTHER

## 2019-12-19 RX ORDER — METHYLPHENIDATE HYDROCHLORIDE 10 MG/1
10 TABLET ORAL 2 TIMES DAILY WITH MEALS
Qty: 60 TABLET | Refills: 0 | Status: CANCELLED | OUTPATIENT
Start: 2019-12-19

## 2019-12-19 RX ORDER — ATENOLOL 50 MG/1
50 TABLET ORAL DAILY
Qty: 90 TABLET | Refills: 3 | Status: SHIPPED | OUTPATIENT
Start: 2019-12-19 | End: 2020-01-15 | Stop reason: SDUPTHER

## 2019-12-19 RX ORDER — ALENDRONATE SODIUM 70 MG/1
70 TABLET ORAL
Qty: 4 TABLET | Refills: 11 | Status: SHIPPED | OUTPATIENT
Start: 2019-12-19 | End: 2020-08-05 | Stop reason: SDUPTHER

## 2019-12-19 RX ORDER — SERTRALINE HYDROCHLORIDE 100 MG/1
TABLET, FILM COATED ORAL
Qty: 135 TABLET | Refills: 3 | Status: SHIPPED | OUTPATIENT
Start: 2019-12-19 | End: 2020-08-05 | Stop reason: SDUPTHER

## 2019-12-19 NOTE — PATIENT INSTRUCTIONS
basaglar 25 units once daily  novolog 10 units with meals.    If sugars are high - > 200:  Then increase the basaglar to 35 units daily  And stay on novolog 10 units.

## 2019-12-19 NOTE — PROGRESS NOTES
This note was created by combination of typed  and M-Modal dictation.  Transcription errors may be present.  If there are any questions, please contact me.    Assessment & Plan:   History of UTI  Unspecified abnormal findings in urine   -repeat UA UCx.  Risk factors - wears diapers. Asymptomatic s/p treatment.  -     Urinalysis; Future; Expected date: 12/19/2019  -     Urine culture; Future; Expected date: 12/19/2019    Lacunar stroke of left subthalamic region  CNS vasculitis failed imuran; on cytoxan  -rheum and neuro plan to try rituxan.  Awaiting approval.    Type 2 diabetes mellitus with hyperglycemia, with long-term current use of insulin  Controlled type 2 diabetes mellitus with diabetic nephropathy, with long-term current use of insulin  -Victoza had been expensive but I think he is out of the donut hole.  Stay on Victoza.  Stay on metformin.  Insulin, previous to hospitalization had been on Basaglar 40 b.i.d. and NovoLog 20 with meals.  While in the hospital he seemed to do okay with much lower doses.  In so he was subsequently dropped to Basaglar 25 and NovoLog sliding scale.  His blood sugars while in rehab were good bit on the low side.  Stay on lower dose.  Basaglar 25 and NovoLog 10.  If sugars start to go up, they can contact me, in which case would increase him to Basaglar 35 if glucose 200 or above.  a1c was good on entering the hospital.  Could be that while inpatient, was eating different/more restricted carbs?   -     liraglutide 0.6 mg/0.1 mL, 18 mg/3 mL, subq PNIJ (VICTOZA 3-TOMASZ) 0.6 mg/0.1 mL (18 mg/3 mL) PnIj; Inject 1.8 mg into the skin once daily.  Dispense: 9 mL; Refill: 11  -     POCT Glucose, Hand-Held Device    Essential hypertension  -stable, refilled atenolol and diltiazem. Also on avapro  -     atenolol (TENORMIN) 50 MG tablet; Take 1 tablet (50 mg total) by mouth once daily.  Dispense: 90 tablet; Refill: 3  -     diltiaZEM (DILT-XR) 240 MG CDCR; Take 1 capsule (240 mg  total) by mouth once daily.  Dispense: 90 capsule; Refill: 3    Fatigue, unspecified type  -defer to neuro for ritalin refill    Major depressive disorder with single episode, in full remission  -stable on zoloft 100  -     sertraline (ZOLOFT) 100 MG tablet; 1.5 tablet daily  Dispense: 135 tablet; Refill: 3    Other osteoporosis with current pathological fracture with routine healing, subsequent encounter  -refilled fosamax  -     alendronate (FOSAMAX) 70 MG tablet; Take 1 tablet (70 mg total) by mouth every 7 days.  Dispense: 4 tablet; Refill: 11    Cognitive impairment  -refilled donepezil  -     donepezil (ARICEPT) 5 MG tablet; Take 1 tablet (5 mg total) by mouth every evening.  Dispense: 90 tablet; Refill: 3    Medications Discontinued During This Encounter   Medication Reason    liraglutide 0.6 mg/0.1 mL, 18 mg/3 mL, subq PNIJ (VICTOZA 3-TOMASZ) 0.6 mg/0.1 mL (18 mg/3 mL) PnIj Reorder    atenolol (TENORMIN) 50 MG tablet Reorder    sertraline (ZOLOFT) 100 MG tablet Reorder    DILT- mg CDCR Reorder    alendronate (FOSAMAX) 70 MG tablet Reorder       meds sent this encounter:  Medications Ordered This Encounter   Medications    alendronate (FOSAMAX) 70 MG tablet     Sig: Take 1 tablet (70 mg total) by mouth every 7 days.     Dispense:  4 tablet     Refill:  11    atenolol (TENORMIN) 50 MG tablet     Sig: Take 1 tablet (50 mg total) by mouth once daily.     Dispense:  90 tablet     Refill:  3    diltiaZEM (DILT-XR) 240 MG CDCR     Sig: Take 1 capsule (240 mg total) by mouth once daily.     Dispense:  90 capsule     Refill:  3    donepezil (ARICEPT) 5 MG tablet     Sig: Take 1 tablet (5 mg total) by mouth every evening.     Dispense:  90 tablet     Refill:  3    liraglutide 0.6 mg/0.1 mL, 18 mg/3 mL, subq PNIJ (VICTOZA 3-TOMASZ) 0.6 mg/0.1 mL (18 mg/3 mL) PnIj     Sig: Inject 1.8 mg into the skin once daily.     Dispense:  9 mL     Refill:  11    sertraline (ZOLOFT) 100 MG tablet     Si.5 tablet daily      Dispense:  135 tablet     Refill:  3     Please consider 90 day supplies to promote better adherence       Follow Up: No follow-ups on file.    Subjective:     Chief Complaint   Patient presents with    Hospital Follow Up       CHRISTAL Doherty is a 59 y.o. male, last appointment with this clinic was 9/26/2019.    Admission Date: 12/3/2019  Hospital Length of Stay: 6 days  Discharge Date and Time:  12/09/2019 8:44 PM  Hospital Course:   Admitted o  on 12/4 for acute encephalopathy and sepsis due to UTI.  Started empirically on zosyn. Febrile overnight. Wife concerned about worsening mental status this morning and seizure like activity (rt leg shaking and tachypnea with unresponsiveness) in the morning. Neurology consulted and recommended MRI w/wo contrast and EEG. Disoriented to time, but awakens and answers some questions appropriately during eval. Denies dysuria, N/V, SOB. MRI brain w chronic ischemic change, no evidence of recent infarction or other acute intracranial pathology. Abx de-escalated to levaquin to cover for possible lower resp infection vs UTI. BCx NGTD. Labs significant for resolution of UTI and worsening thrombocytopenia. Heparin subQ discontinued, however, has had plts as low as 78 in the past. On 12/7 platelet count improving. Completed 7 day course abx. Menatl status remained at baseline. Medically stable for discharge to SNF.    Admission Date: 12/9/2019  Hospital Length of Stay: 9 days  Discharge Date and Time:  12/18/2019 5:44 PM  Hospital Course:   Patient progressed well with PT and OT. Patient had no significant events during their stay at SNF. Home health was set up. DME was ordered if needed. Follow up appointment to be made by patient within one week. All prescriptions and discharge instructions were ordered to be given to the patient prior to discharge.     Discharged on novolog SSI  lantus 25 daily.  Can increase to up to 60 when on cytoxan.    No indwelling hunter. Incontinent so  wears a diaper. Wife now is more vigilant about diaper changing.  Wears a depends during the day. And wears diaper at night.  Will go to bathroom if he is reminded.     Wife is wary of change from cytoxan to rituxan.  Is still awaiting approval for this.  But after discussion she would be willing to try this as the cytoxan seems to have waning duration of efficacy.    DM - prior to hospitalization, glargin 40 BID and novolog 20 TID with meals.  But while inpatient was getting glargine 25 once daily.  Sugars were in the 200s initially and started coming down.  And in rehab, sugars were low.    Discharge instruction was glargine 25 daily and novolog sliding scale.    Wife did not believe this and was not aware that they had dropped administered dose during hospitalization    She administered basaglar 30 (compromise) and novolog 20 units this AM.  But he only ate crackers.  POCT glucose today 105.    victoza was expensive. But probably b/c at the time he was in the donut hole.  I suspect he is out of that now.    Patient Care Team:  Edgar Nova MD as PCP - General (Internal Medicine)  Promise Steele NP as Nurse Practitioner (Endocrinology)  Shana Love MD as Consulting Physician (Neurology)  Mima Pina MA as Care Coordinator    Patient Active Problem List    Diagnosis Date Noted    Sepsis due to urinary tract infection 12/09/2019    Severe sepsis 12/03/2019    Stress 11/04/2019    Diarrhea 07/18/2019    Anemia associated with chemotherapy 07/18/2019    Chemotherapy-induced thrombocytopenia 07/18/2019    MANISH (acute kidney injury) 06/21/2019    Screen for colon cancer 05/21/2019    Dementia with behavioral disturbance 04/30/2019    Osteoporosis with current pathological fracture with routine healing from steroids; compression fx 2017; 4/2019 bisphosphonate 04/10/2019    Duodenal ulcer 6/2017 EGD - duodenum and gastric ulcer s/p cautery 03/28/2019 6/2017 EGD showing duodenal and gastric  ulcer which were treated with cautery.      Renal cyst bilateral simple and minimally, complicated renal cysts. 03/28/2019   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    Central scotoma, right eye 02/06/2019    Encounter for chemotherapy management 12/07/2018    Encounter for long term current use of cyclophosphamide 10/30/2018    Need for Tdap vaccination 08/29/2018    Need for hepatitis A and B vaccination 08/29/2018    Need for pneumococcal vaccination 08/29/2018    Acquired immunocompromised state 08/29/2018     cyclophosphamide      Controlled type 2 diabetes mellitus with diabetic nephropathy, with long-term current use of insulin 08/01/2018    Chemotherapy induced nausea and vomiting 06/12/2018    Dysphasia     Aphasia 12/04/2017    Dysphonia 12/04/2017    Cognitive deficits 12/04/2017    Closed compression fracture of L4 lumbar vertebra on MRI 11/2017 11/15/2017     11/2017 MRI L spine: Acute burst type fracture of the L4 vertebra with fracture line extending to the posterior cortex of the L4 vertebra. There is loss of central vertebral body height of the superior end plate of approximately 40%.  IMO Regulatory Load October 2019      Adverse effect of glucocorticoids and synthetic analogues, initial encounter 09/15/2017    Lacunar stroke of left subthalamic region 09/14/2017 9/14/2017 MRI brain: 1. Findings compatible with a small acute lacunar infarct adjacent to the left frontal horn.  Nonspecific enhancement just inferior to this region and extending into the left basal ganglia, as well as within the inferior aspect of the left cerebellum.  No evidence of a focal mass.  2.  Extensive increased signal intensity involving the periventricular white matter compatible with demyelinating disease versus vasculitis.  3.  Clinical correlation and followup recommended.  4.  This reportedly flattened in the EPIC and the corpus callosum medical record system.       Episodic confusion      Improved now that back on cyclophosphamide      Cytopenia 08/30/2017    Impaired functional mobility, balance, gait, and endurance 06/14/2017    Urinary incontinence 06/04/2017    CNS vasculitis failed imuran; on cytoxan 06/02/2017     Failed Cytoxan hiatus and transition to Imuran Sept/Oct 2018;   Now much improved back on Cytoxan;   Refer to Dr. Daley of rheumatology for second opinion on management      Encephalopathy 05/26/2017    Type 2 diabetes mellitus with diabetic polyneuropathy, with long-term current use of insulin 05/14/2017    Amblyopia 05/13/2017    Tubular adenoma of colon 2014 C scope polyp repeat 3 years; 5/21/2019 colonscopy 8 ascending 4 descending polyps path 2 tubular adenomas, rest are inflammatory polyps; repeat 1 year 01/30/2015 2/21/2014 colonoscopy showing mild nonspecific acute and chronic inflammation of the ascending colon.  Hyperplastic polyp. Repeat 3 years.  5/21/2019 colonscopy 8 ascending 4 descending polyps path 2 tubular adenomas, rest are inflammatory polyps; repeat 1 year      Lipodystrophy 10/11/2013    NAFLD (nonalcoholic fatty liver disease) 6/12/2015 liver biopsy showing advanced stage fibrosis with bridging fibrosis and scattered nodules. 10/11/2013     6/12/2015 liver biopsy showing advanced stage fibrosis with bridging fibrosis and scattered nodules.      Obesity (BMI 30-39.9) 10/11/2013    ED (erectile dysfunction) 10/11/2013     JOHN with CPAP at night 10/11/2013    Episode of recurrent major depressive disorder 11/09/2012    Mixed hyperlipidemia 11/09/2012    Essential hypertension 5/2016 TTE diastolic dysfunction 11/09/2012 5/11/2016 TTE:   1 - Mildly depressed left ventricular systolic function (EF 45-50%).  Mild global hypokinesis at rest. 2 - Eccentric hypertrophy. 3 - Left ventricular diastolic dysfunction.         PAST MEDICAL HISTORY:  Past Medical History:   Diagnosis Date    Amblyopia     Cataract     CNS  vasculitis 6/2/2017    Follows with Dr. Love By mri.  Several active lesions, many old. 5/13: MRA brain/neck, MRI brain w/wo contrast show no vascular occlusion, multiple foci of hyperintensity in deep cerebral periventricular white matter in pattern of demyelinating process.  5/17: MRI spine performed, no spinal cord lesions. Hospitalization 6/2017. EEG was performed negative for seizures.  Repeat MRI showing new lesion, question whether this was demyelination versus CVA. 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 Findings compatible with a small acute lacunar infarct adjacent to the left frontal horn.  Nonspecific enhancement just inferior to this region and extending into the left basal ganglia, as well as within the inferior aspect of the left cerebellum.  No evidence of a focal mass. 2.  Extensive increased signal intensity involving the periventricular white matter compatible with demyelinating disease versus vasculitis. 3.  Clinical correlation and followup recommended. 4.  This reportedly flattened in the EPIC and the corpus callosum medical record system.    Mixed hyperlipidemia 11/9/2012    NAFLD (nonalcoholic fatty liver disease) 10/11/2013    CHASE (nonalcoholic steatohepatitis)     Obesity     Stroke     Type 2 diabetes mellitus with diabetic polyneuropathy, with long-term current use of insulin 5/14/2017    Type 2 diabetes mellitus with hyperglycemia, with long-term current use of insulin 10/11/2013       PAST SURGICAL HISTORY:  Past Surgical History:   Procedure Laterality Date    COLONOSCOPY N/A 5/21/2019    Procedure: COLONOSCOPY;  Surgeon: Alex Ascencio MD;  Location: [facility];  Service: Endoscopy;  Laterality: N/A;  confirmed appt-sp    INSERTION OF TUNNELED CENTRAL  VENOUS CATHETER (CVC) WITH SUBCUTANEOUS PORT N/A 12/7/2018    Procedure: [id number];  Surgeon: Luan Diagnostic Provider;  Location: [facility] OR [address] [place] [address];  Service: Radiology;  Laterality: N/A;    SKIN CANCER EXCISION         SOCIAL HISTORY:  Social History     Socioeconomic History    Marital status:      Spouse name: Not on file    Number of children: Not on file    Years of education: Not on file    Highest education level: Not on file   Occupational History    Occupation: unemployed   Social Needs    Financial resource strain: Very hard    Food insecurity:     Worry: Never true     Inability: Never true    Transportation needs:     Medical: No     Non-medical: No   Tobacco Use    Smoking status: Never Smoker    Smokeless tobacco: Never Used   Substance and Sexual Activity    Alcohol use: No     Alcohol/week: 0.0 standard drinks     Frequency: Never     Drinks per session: 1 or 2     Binge frequency: Never    Drug use: No    Sexual activity: Yes     Partners: Female   Lifestyle    Physical activity:     Days per week: 0 days     Minutes per session: 0 min    Stress: Only a little   Relationships    Social connections:     Talks on phone: More than three times a week     Gets together: Once a week     Attends Judaism service: Not on file     Active member of club or organization: No     Attends meetings of clubs or organizations: Never     Relationship status:    Other Topics Concern    Not on file   Social History Narrative    Not on file       ALLERGIES AND MEDICATIONS: updated and reviewed.  Review of patient's allergies indicates:  No Known Allergies  Current Outpatient Medications   Medication Sig Dispense Refill    alclomethasone (ACLOVATE) 0.05 % cream       alendronate (FOSAMAX) 70 MG tablet Take 1 tablet (70 mg total) by mouth every 7 days. 4 tablet 11    aspirin (ECOTRIN) 81 MG EC tablet Take 81 mg by mouth once daily.      atenolol (TENORMIN) 50 MG tablet TAKE  "1 TABLET BY MOUTH ONCE DAILY 90 tablet 3    atorvastatin (LIPITOR) 40 MG tablet TAKE 1 TABLET BY MOUTH ONCE DAILY 90 tablet 3    blood sugar diagnostic (TRUE METRIX GLUCOSE TEST STRIP) Strp Test blood sugar four times a day 400 each 11    DILT- mg CDCR TAKE 1 CAPSULE BY MOUTH ONCE DAILY 90 capsule 3    donepezil (ARICEPT) 5 MG tablet Take 1 tablet (5 mg total) by mouth every evening. Start with 1/2 tab daily x 1 week then whole tablet maintenance 30 tablet 2    flash glucose sensor (FREESTYLE ARELY 14 DAY SENSOR) Kit Glucose checking 4 times a day 2 kit 11    FREESTYLE ARELY 14 DAY READER Misc GLUCOSE TESTING : FASTING AND PRIOR TO MEALS 1 each 0    insulin (BASAGLAR KWIKPEN U-100 INSULIN) glargine 100 units/mL (3mL) SubQ pen Inject 25 Units into the skin once daily. Up to 60 BID with cytoxan 2 Box 11    insulin aspart U-100 (NOVOLOG) 100 unit/mL (3 mL) InPn pen Inject 1-10 Units into the skin before meals and at bedtime as needed (Hyperglycemia). 3 mL 0    insulin syringe-needle U-100 (INSULIN SYRINGE) 1/2 mL 30 gauge x 5/16 Syrg Use 3x/day 300 each 3    irbesartan (AVAPRO) 300 MG tablet TAKE 1 TABLET BY MOUTH ONCE DAILY IN THE EVENING 90 tablet 3    ketoconazole (NIZORAL) 2 % cream       lancets 30 gauge Misc Test blood sugar four times a day 100 each 11    liraglutide 0.6 mg/0.1 mL, 18 mg/3 mL, subq PNIJ (VICTOZA 3-TOMASZ) 0.6 mg/0.1 mL (18 mg/3 mL) PnIj Inject 1.8 mg into the skin once daily. 9 mL 11    metFORMIN (GLUCOPHAGE-XR) 500 MG 24 hr tablet Take 2 tablets (1,000 mg total) by mouth 2 (two) times daily with meals. 360 tablet 3    methylphenidate HCl (RITALIN) 10 MG tablet Take 1 tablet (10 mg total) by mouth 2 (two) times daily with meals. 60 tablet 0    omega-3 fatty acids-vitamin E (FISH OIL) 1,000 mg Cap Take 1 capsule by mouth 2 (two) times daily.  0    pen needle, diabetic (BD ULTRA-FINE ANA PEN NEEDLE) 32 gauge x 5/32" Ndle 4x daily insulin pen needle, relion 400 each 3    " "QUEtiapine (SEROQUEL) 25 MG Tab Take 1 tablet (25 mg total) by mouth every evening. 30 tablet 11    sertraline (ZOLOFT) 100 MG tablet TAKE 1 & 1/2 (ONE & ONE-HALF) TABLETS BY MOUTH ONCE DAILY 45 tablet 1    vitamin D 1000 units Tab Take 5 tablets (5,000 Units total) by mouth once daily.       No current facility-administered medications for this visit.        Review of Systems   Unable to perform ROS: Dementia   Constitutional: Negative for chills and fever.   Genitourinary: Negative for dysuria.       Objective:   Physical Exam   Vitals:    12/19/19 1333   BP: 124/88   BP Location: Left arm   Patient Position: Sitting   BP Method: Medium (Manual)   Pulse: 68   Temp: 98.1 °F (36.7 °C)   TempSrc: Oral   SpO2: 99%   Weight: 96.6 kg (213 lb)   Height: 5' 10" (1.778 m)    Body mass index is 30.56 kg/m².            Physical Exam   Constitutional: He appears well-developed and well-nourished. No distress.   Eyes: EOM are normal.   Cardiovascular: Normal rate, regular rhythm and normal heart sounds.   No murmur heard.  Pulmonary/Chest: Effort normal and breath sounds normal.   Musculoskeletal: Normal range of motion.   Neurological: He is alert. Coordination normal.   Skin: Skin is warm and dry.   Psychiatric:   Poor eye contact. No internal stimuli. Appropriate verbal response to questions.  Some psychomotor retardation.     "

## 2019-12-23 ENCOUNTER — TELEPHONE (OUTPATIENT)
Dept: PSYCHIATRY | Facility: CLINIC | Age: 59
End: 2019-12-23

## 2019-12-23 NOTE — TELEPHONE ENCOUNTER
Called pt and spoke to spouse.  Called to ascertain the reason of appointment with me for today.  She was not sure.   She did state that her  needed consultation for her zoloft.   She was given the phone number to set an appointment with Mrs. Leiva.  In addition I transferred her to set such appointment.   I informed her that cbt is unlikely to be useful for her  due to the memory issues.  She acknowledge understanding.

## 2019-12-27 ENCOUNTER — TELEPHONE (OUTPATIENT)
Dept: FAMILY MEDICINE | Facility: CLINIC | Age: 59
End: 2019-12-27

## 2019-12-27 DIAGNOSIS — I63.81 LACUNAR STROKE OF LEFT SUBTHALAMIC REGION: Primary | ICD-10-CM

## 2019-12-27 NOTE — TELEPHONE ENCOUNTER
----- Message from Jacqui Carrera sent at 12/27/2019  9:11 AM CST -----  Contact: [facility]  Type: Patient Call Back    Who called:Jennifer    What is the request in detail:Jennifer called to request orders for bedside commode and hospital bed. Please call to advise    Can the clinic reply by [facility]?    Would the patient rather a call back or a response via [facility]? Call    Best call back number:[phone]

## 2019-12-27 NOTE — TELEPHONE ENCOUNTER
"----- Message from [place] Laughlin sent at 12/27/2019  9:08 AM CST -----  Contact: moustapha " wife"  [phone]  .Type: Patient Call Back    Who called: moustapha " wife"    What is the request in detail: requesting a Rx for hospital bed to be sent to [facility]     Can the clinic reply by [facility]?  Call back     Would the patient rather a call back or a response via [facility]?  Call back     Best call back number: [phone]          "

## 2019-12-31 ENCOUNTER — TELEPHONE (OUTPATIENT)
Dept: FAMILY MEDICINE | Facility: CLINIC | Age: 59
End: 2019-12-31

## 2019-12-31 ENCOUNTER — EXTERNAL HOME HEALTH (OUTPATIENT)
Dept: HOME HEALTH SERVICES | Facility: HOSPITAL | Age: 59
End: 2019-12-31
Payer: MEDICARE

## 2019-12-31 NOTE — TELEPHONE ENCOUNTER
----- Message from Maxine López sent at 12/31/2019 12:43 PM CST -----  Type: Patient Call Back    Who called: Donna with [employer] Replaced by [facility]     What is the request in detail: Rep asking to clarify does of Lipitor. Pt currently take 40 MG but paperwork shows 10 MG but has been taking 40 MG    Can the clinic reply by [facility]? No     Would the patient rather a call back or a response via [facility]? Call back     Best call back number: [phone]    Additional Information:

## 2020-01-03 ENCOUNTER — TELEPHONE (OUTPATIENT)
Dept: FAMILY MEDICINE | Facility: CLINIC | Age: 60
End: 2020-01-03

## 2020-01-03 ENCOUNTER — HOSPITAL ENCOUNTER (EMERGENCY)
Facility: HOSPITAL | Age: 60
Discharge: HOME | End: 2020-01-03
Attending: SURGERY
Payer: MEDICARE

## 2020-01-03 VITALS
DIASTOLIC BLOOD PRESSURE: 84 MMHG | SYSTOLIC BLOOD PRESSURE: 151 MMHG | WEIGHT: 213 LBS | OXYGEN SATURATION: 100 % | BODY MASS INDEX: 30.56 KG/M2 | TEMPERATURE: 97 F | RESPIRATION RATE: 20 BRPM | HEART RATE: 82 BPM

## 2020-01-03 DIAGNOSIS — T14.8XXA ABRASION: ICD-10-CM
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
## 2020-01-03 LAB
GLUCOSE SERPL-MCNC: 101 MG/DL (ref 70–110)
GLUCOSE SERPL-MCNC: 101 MG/DL (ref 70–110)

## 2020-01-03 PROCEDURE — 82962 GLUCOSE BLOOD TEST: CPT

## 2020-01-03 PROCEDURE — 99282 EMERGENCY DEPT VISIT SF MDM: CPT

## 2020-01-03 NOTE — ED PROVIDER NOTES
Encounter Date: 1/3/2020       History     Chief Complaint   Patient presents with    Fall     Patient got up from bed and fell against a piece of furniture PTA. No LOC. Hit right ear and now bleeding. Bleeding has stopped and no other acute complaints.     The history is provided by the patient and the spouse.     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 Love By mri.  Several active lesions, many old. 5/13: MRA brain/neck, MRI brain w/wo contrast show no vascular occlusion, multiple foci of hyperintensity in deep cerebral periventricular white matter in pattern of demyelinating process.  5/17: MRI spine performed, no spinal cord lesions. Hospitalization 6/2017. EEG was performed negative for seizures.  Repeat MRI showing new lesion, question whether this was demyelination versus CVA. 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 Findings compatible with a small acute lacunar infarct adjacent to the left frontal horn.  Nonspecific enhancement just inferior to this region and extending into the left basal ganglia, as well as within the inferior aspect of the left cerebellum.  No evidence of a focal mass. 2.  Extensive increased signal intensity involving the periventricular white matter compatible with demyelinating disease versus vasculitis. 3.  Clinical correlation and followup recommended. 4.  This reportedly flattened in the EPIC and the corpus callosum medical record system.    Mixed hyperlipidemia 11/9/2012    NAFLD (nonalcoholic fatty liver disease) 10/11/2013    CHASE (nonalcoholic steatohepatitis)     Obesity     Stroke     Type 2 diabetes mellitus with  diabetic polyneuropathy, with long-term current use of insulin 5/14/2017    Type 2 diabetes mellitus with hyperglycemia, with long-term current use of insulin 10/11/2013     Past Surgical History:   Procedure Laterality Date    COLONOSCOPY N/A 5/21/2019    Procedure: COLONOSCOPY;  Surgeon: Alex Ascencio MD;  Location[facility];  Service: Endoscopy;  Laterality: N/A;  confirmed appt-sp    INSERTION OF TUNNELED CENTRAL VENOUS CATHETER (CVC) WITH SUBCUTANEOUS PORT N/A 12/7/2018    Procedure: [id number]-[id number];  Surgeon: Luan Diagnostic Provider;  Location: [facility] OR [address] [place] [address];  Service: Radiology;  Laterality: N/A;    SKIN CANCER EXCISION       Family History   Problem Relation Age of Onset    Heart disease Mother     Hypertension Mother     Heart failure Mother     Cataracts Mother     Cancer Father         lung    Diabetes Maternal Aunt     Stroke Sister     Rheum arthritis Paternal Grandmother     Amblyopia Neg Hx     Blindness Neg Hx     Glaucoma Neg Hx     Macular degeneration Neg Hx     Retinal detachment Neg Hx     Strabismus Neg Hx      Social History     Tobacco Use    Smoking status: Never Smoker    Smokeless tobacco: Never Used   Substance Use Topics    Alcohol use: No     Alcohol/week: 0.0 standard drinks     Frequency: Never     Drinks per session: 1 or 2     Binge frequency: Never    Drug use: No     Review of Systems   Skin: Positive for wound.   Neurological: Positive for weakness (chronic).   All other systems reviewed and are negative.      Physical Exam     Initial Vitals [01/03/20 1051]   BP Pulse Resp Temp SpO2   (!) 151/84 82 20 97.1 °F (36.2 °C) 100 %      MAP       --         Physical Exam    Nursing note and vitals reviewed.  Constitutional: He appears well-developed and well-nourished. He is cooperative.   HENT:   Head: Normocephalic. Head is with abrasion.       Right Ear: Hearing, tympanic membrane, external ear and ear canal normal.   Left Ear: Hearing, tympanic  membrane, external ear and ear canal normal.   Nose: Nose normal.   Mouth/Throat: Oropharynx is clear and moist and mucous membranes are normal.   Small superficial abrasion that bleeding has resolved.    Eyes: Conjunctivae, EOM and lids are normal. Pupils are equal, round, and reactive to light.   Neck: Trachea normal and normal range of motion. Neck supple.   Cardiovascular: Normal rate, regular rhythm, normal heart sounds, intact distal pulses and normal pulses.   Pulmonary/Chest: Breath sounds normal.   Abdominal: Soft. Normal appearance and bowel sounds are normal.   Musculoskeletal: Normal range of motion.   Neurological: He is alert. He has normal reflexes.   Pleasantly demented gentleman that answers simple questions appropriately.    Skin: Skin is warm, dry and intact.   Psychiatric: He has a normal mood and affect. His speech is normal. Cognition and memory are normal.         ED Course   Procedures  Labs Reviewed   POCT GLUCOSE MONITORING CONTINUOUS          Imaging Results    None           Wound cleaned with peroxide and placed antibiotic ointment. No active bleeding. Discussed wound care with wife. Return to ER or see PCP in 24-48 hours if not improved or sooner for new or worsening symptoms.                                Clinical Impression:       ICD-10-CM ICD-9-CM   1. Fall, initial encounter W19.XXXA E888.9   2. Abrasion T14.8XXA 919.0         Disposition:   Disposition: Discharged  Condition: Stable                     Tom MORAIMA Reyes  01/03/20 1107

## 2020-01-03 NOTE — ED TRIAGE NOTES
59 y.o. male presents to ER ED 01/ED 01A   Chief Complaint   Patient presents with    Fall   . Pt was with physical therapy this morning and had a fall, pt unsure if he passed out, pt AAOx4 in triage with no complaints

## 2020-01-03 NOTE — TELEPHONE ENCOUNTER
Spoke with patient's wife Bekah and she said that patient fell out of the bed today and scratched his ear. Physical therapy came by when this had happen. She said that she has requested a hospital bed through the home health agency [facility] and they said that they had placed order a week ago. I contact N and spoke with Jessika and she informed me that they did received the order and is waiting for the nurse to review. Informed her that patient fell out of the bed this morning and wife is concern on when bed will arrive. Irlanda informed me that she will place a email out to the reviewing nurse. This message was related to the patient's wife and she was also given the number to contact [facility].

## 2020-01-03 NOTE — ED NOTES
Wife states pt is a very poor historian due to his chronic illness of dementia.  Pt is alert. No neuro deficits. Moves all extrems.

## 2020-01-03 NOTE — ED NOTES
Cleansed right ear area aseptic technique with h2o2 sterile gauze. Minor ecchymosis noted to right ear lobe.  No distress noted to pt.

## 2020-01-03 NOTE — TELEPHONE ENCOUNTER
----- Message from Savanna Valdez sent at 1/3/2020 11:53 AM CST -----  Contact: Bekah ( wife )  Contact: Bekah ( wife )    What is the request in detail:   Requesting a call in regards to getting orders for a hospital bed     Can the clinic reply by [facility]:  No      What Number [facility]:   [phone]

## 2020-01-03 NOTE — TELEPHONE ENCOUNTER
----- Message from Phillip Ross sent at 1/3/2020 10:04 AM CST -----  Contact: [facility]- Jennifer TerrellSeytgdugt-[phone]  Type: Patient Call Back    Who called:Self  What is the request in detail:Patient is on the way to the hospital from a fall and is bleeding from the ear    Can the clinic reply by [facility]? NO    Would the patient rather a call back or a response via [facility]? Callback    Best call back number:[phone]

## 2020-01-06 ENCOUNTER — HOSPITAL ENCOUNTER (INPATIENT)
Facility: HOSPITAL | Age: 60
LOS: 3 days | Discharge: HOME HEALTH | DRG: 683 | End: 2020-01-09
Attending: EMERGENCY MEDICINE | Admitting: HOSPITALIST
Payer: MEDICARE

## 2020-01-06 ENCOUNTER — TELEPHONE (OUTPATIENT)
Dept: RHEUMATOLOGY | Facility: CLINIC | Age: 60
End: 2020-01-06

## 2020-01-06 ENCOUNTER — OFFICE VISIT (OUTPATIENT)
Dept: NEUROLOGY | Facility: CLINIC | Age: 60
End: 2020-01-06
Payer: MEDICARE

## 2020-01-06 VITALS
DIASTOLIC BLOOD PRESSURE: 69 MMHG | BODY MASS INDEX: 30.05 KG/M2 | HEART RATE: 110 BPM | WEIGHT: 209.88 LBS | HEIGHT: 70 IN | SYSTOLIC BLOOD PRESSURE: 101 MMHG

## 2020-01-06 DIAGNOSIS — G93.40 ENCEPHALOPATHY: ICD-10-CM

## 2020-01-06 DIAGNOSIS — N17.9 ACUTE RENAL FAILURE, UNSPECIFIED ACUTE RENAL FAILURE TYPE: Primary | ICD-10-CM

## 2020-01-06 DIAGNOSIS — I63.81 LACUNAR STROKE OF LEFT SUBTHALAMIC REGION: ICD-10-CM

## 2020-01-06 DIAGNOSIS — Z79.899 HIGH RISK MEDICATION USE: ICD-10-CM

## 2020-01-06 DIAGNOSIS — Z71.89 COUNSELING REGARDING GOALS OF CARE: ICD-10-CM

## 2020-01-06 DIAGNOSIS — R53.83 FATIGUE, UNSPECIFIED TYPE: ICD-10-CM

## 2020-01-06 DIAGNOSIS — R62.7 FAILURE TO THRIVE IN ADULT: ICD-10-CM

## 2020-01-06 DIAGNOSIS — I77.6 CNS VASCULITIS: Primary | ICD-10-CM

## 2020-01-06 DIAGNOSIS — E86.0 DEHYDRATION: ICD-10-CM

## 2020-01-06 DIAGNOSIS — R23.1 SKIN PALLOR: ICD-10-CM

## 2020-01-06 DIAGNOSIS — R51.9 ACUTE NONINTRACTABLE HEADACHE, UNSPECIFIED HEADACHE TYPE: ICD-10-CM

## 2020-01-06 DIAGNOSIS — Z29.89 PROPHYLACTIC IMMUNOTHERAPY: ICD-10-CM

## 2020-01-06 LAB
ALBUMIN SERPL BCP-MCNC: 3.9 G/DL (ref 3.5–5.2)
ALP SERPL-CCNC: 86 U/L (ref 55–135)
ALT SERPL W/O P-5'-P-CCNC: 16 U/L (ref 10–44)
ANION GAP SERPL CALC-SCNC: 12 MMOL/L (ref 8–16)
AST SERPL-CCNC: 23 U/L (ref 10–40)
BACTERIA #/AREA URNS AUTO: ABNORMAL /HPF
BASOPHILS # BLD AUTO: 0.04 K/UL (ref 0–0.2)
BASOPHILS NFR BLD: 0.5 % (ref 0–1.9)
BILIRUB SERPL-MCNC: 0.5 MG/DL (ref 0.1–1)
BILIRUB UR QL STRIP: NEGATIVE
BUN SERPL-MCNC: 25 MG/DL (ref 8–23)
CALCIUM SERPL-MCNC: 10.2 MG/DL (ref 8.7–10.5)
CHLORIDE SERPL-SCNC: 106 MMOL/L (ref 95–110)
CLARITY UR REFRACT.AUTO: ABNORMAL
CO2 SERPL-SCNC: 23 MMOL/L (ref 23–29)
COLOR UR AUTO: YELLOW
CREAT SERPL-MCNC: 2.3 MG/DL (ref 0.5–1.4)
CRP SERPL-MCNC: 6 MG/L (ref 0–8.2)
DIFFERENTIAL METHOD: ABNORMAL
EOSINOPHIL # BLD AUTO: 0.1 K/UL (ref 0–0.5)
EOSINOPHIL NFR BLD: 1.2 % (ref 0–8)
ERYTHROCYTE [DISTWIDTH] IN BLOOD BY AUTOMATED COUNT: 13.8 % (ref 11.5–14.5)
ERYTHROCYTE [SEDIMENTATION RATE] IN BLOOD BY WESTERGREN METHOD: 35 MM/HR (ref 0–23)
EST. GFR  (AFRICAN AMERICAN): 34.2 ML/MIN/1.73 M^2
EST. GFR  (NON AFRICAN AMERICAN): 29.5 ML/MIN/1.73 M^2
GLUCOSE SERPL-MCNC: 125 MG/DL (ref 70–110)
GLUCOSE UR QL STRIP: NEGATIVE
HCT VFR BLD AUTO: 33.8 % (ref 40–54)
HGB BLD-MCNC: 11.4 G/DL (ref 14–18)
HGB UR QL STRIP: ABNORMAL
HYALINE CASTS UR QL AUTO: 1 /LPF
IMM GRANULOCYTES # BLD AUTO: 0.03 K/UL (ref 0–0.04)
IMM GRANULOCYTES NFR BLD AUTO: 0.4 % (ref 0–0.5)
KETONES UR QL STRIP: NEGATIVE
LEUKOCYTE ESTERASE UR QL STRIP: NEGATIVE
LYMPHOCYTES # BLD AUTO: 1.6 K/UL (ref 1–4.8)
LYMPHOCYTES NFR BLD: 19.5 % (ref 18–48)
MCH RBC QN AUTO: 28.9 PG (ref 27–31)
MCHC RBC AUTO-ENTMCNC: 33.7 G/DL (ref 32–36)
MCV RBC AUTO: 86 FL (ref 82–98)
MICROSCOPIC COMMENT: ABNORMAL
MONOCYTES # BLD AUTO: 0.9 K/UL (ref 0.3–1)
MONOCYTES NFR BLD: 11 % (ref 4–15)
NEUTROPHILS # BLD AUTO: 5.6 K/UL (ref 1.8–7.7)
NEUTROPHILS NFR BLD: 67.4 % (ref 38–73)
NITRITE UR QL STRIP: NEGATIVE
NRBC BLD-RTO: 0 /100 WBC
PH UR STRIP: 5 [PH] (ref 5–8)
PLATELET # BLD AUTO: 129 K/UL (ref 150–350)
PMV BLD AUTO: 9.8 FL (ref 9.2–12.9)
POTASSIUM SERPL-SCNC: 4.2 MMOL/L (ref 3.5–5.1)
PROT SERPL-MCNC: 7.4 G/DL (ref 6–8.4)
PROT UR QL STRIP: ABNORMAL
RBC # BLD AUTO: 3.94 M/UL (ref 4.6–6.2)
RBC #/AREA URNS AUTO: 6 /HPF (ref 0–4)
SODIUM SERPL-SCNC: 141 MMOL/L (ref 136–145)
SP GR UR STRIP: 1.01 (ref 1–1.03)
SQUAMOUS #/AREA URNS AUTO: 0 /HPF
URN SPEC COLLECT METH UR: ABNORMAL
WBC # BLD AUTO: 8.31 K/UL (ref 3.9–12.7)
WBC #/AREA URNS AUTO: 1 /HPF (ref 0–5)

## 2020-01-06 PROCEDURE — 99285 PR EMERGENCY DEPT VISIT,LEVEL V: ICD-10-PCS | Mod: ,,, | Performed by: EMERGENCY MEDICINE

## 2020-01-06 PROCEDURE — 82570 ASSAY OF URINE CREATININE: CPT

## 2020-01-06 PROCEDURE — 99285 EMERGENCY DEPT VISIT HI MDM: CPT | Mod: ,,, | Performed by: EMERGENCY MEDICINE

## 2020-01-06 PROCEDURE — 3008F BODY MASS INDEX DOCD: CPT | Mod: CPTII,S$GLB,, | Performed by: CLINICAL NURSE SPECIALIST

## 2020-01-06 PROCEDURE — 11000001 HC ACUTE MED/SURG PRIVATE ROOM

## 2020-01-06 PROCEDURE — 80053 COMPREHEN METABOLIC PANEL: CPT

## 2020-01-06 PROCEDURE — 63600175 PHARM REV CODE 636 W HCPCS: Performed by: EMERGENCY MEDICINE

## 2020-01-06 PROCEDURE — 86140 C-REACTIVE PROTEIN: CPT

## 2020-01-06 PROCEDURE — 99999 PR PBB SHADOW E&M-EST. PATIENT-LVL III: CPT | Mod: PBBFAC,,, | Performed by: CLINICAL NURSE SPECIALIST

## 2020-01-06 PROCEDURE — 3074F SYST BP LT 130 MM HG: CPT | Mod: CPTII,S$GLB,, | Performed by: CLINICAL NURSE SPECIALIST

## 2020-01-06 PROCEDURE — 85652 RBC SED RATE AUTOMATED: CPT

## 2020-01-06 PROCEDURE — 12000002 HC ACUTE/MED SURGE SEMI-PRIVATE ROOM

## 2020-01-06 PROCEDURE — 3078F DIAST BP <80 MM HG: CPT | Mod: CPTII,S$GLB,, | Performed by: CLINICAL NURSE SPECIALIST

## 2020-01-06 PROCEDURE — 99999 PR PBB SHADOW E&M-EST. PATIENT-LVL III: ICD-10-PCS | Mod: PBBFAC,,, | Performed by: CLINICAL NURSE SPECIALIST

## 2020-01-06 PROCEDURE — 99285 EMERGENCY DEPT VISIT HI MDM: CPT

## 2020-01-06 PROCEDURE — 81001 URINALYSIS AUTO W/SCOPE: CPT

## 2020-01-06 PROCEDURE — 3074F PR MOST RECENT SYSTOLIC BLOOD PRESSURE < 130 MM HG: ICD-10-PCS | Mod: CPTII,S$GLB,, | Performed by: CLINICAL NURSE SPECIALIST

## 2020-01-06 PROCEDURE — 84300 ASSAY OF URINE SODIUM: CPT

## 2020-01-06 PROCEDURE — 99214 PR OFFICE/OUTPT VISIT, EST, LEVL IV, 30-39 MIN: ICD-10-PCS | Mod: S$GLB,,, | Performed by: CLINICAL NURSE SPECIALIST

## 2020-01-06 PROCEDURE — 85025 COMPLETE CBC W/AUTO DIFF WBC: CPT

## 2020-01-06 PROCEDURE — 99214 OFFICE O/P EST MOD 30 MIN: CPT | Mod: S$GLB,,, | Performed by: CLINICAL NURSE SPECIALIST

## 2020-01-06 PROCEDURE — 3078F PR MOST RECENT DIASTOLIC BLOOD PRESSURE < 80 MM HG: ICD-10-PCS | Mod: CPTII,S$GLB,, | Performed by: CLINICAL NURSE SPECIALIST

## 2020-01-06 PROCEDURE — 3008F PR BODY MASS INDEX (BMI) DOCUMENTED: ICD-10-PCS | Mod: CPTII,S$GLB,, | Performed by: CLINICAL NURSE SPECIALIST

## 2020-01-06 RX ORDER — GLUCAGON 1 MG
1 KIT INJECTION
Status: DISCONTINUED | OUTPATIENT
Start: 2020-01-06 | End: 2020-01-09 | Stop reason: HOSPADM

## 2020-01-06 RX ORDER — QUETIAPINE FUMARATE 25 MG/1
25 TABLET, FILM COATED ORAL NIGHTLY
Status: DISCONTINUED | OUTPATIENT
Start: 2020-01-07 | End: 2020-01-09 | Stop reason: HOSPADM

## 2020-01-06 RX ORDER — ATORVASTATIN CALCIUM 20 MG/1
40 TABLET, FILM COATED ORAL DAILY
Status: DISCONTINUED | OUTPATIENT
Start: 2020-01-07 | End: 2020-01-09 | Stop reason: HOSPADM

## 2020-01-06 RX ORDER — IBUPROFEN 200 MG
16 TABLET ORAL
Status: DISCONTINUED | OUTPATIENT
Start: 2020-01-06 | End: 2020-01-09 | Stop reason: HOSPADM

## 2020-01-06 RX ORDER — SODIUM CHLORIDE 0.9 % (FLUSH) 0.9 %
10 SYRINGE (ML) INJECTION
Status: DISCONTINUED | OUTPATIENT
Start: 2020-01-06 | End: 2020-01-09 | Stop reason: HOSPADM

## 2020-01-06 RX ORDER — DONEPEZIL HYDROCHLORIDE 5 MG/1
5 TABLET, FILM COATED ORAL NIGHTLY
Status: DISCONTINUED | OUTPATIENT
Start: 2020-01-07 | End: 2020-01-09 | Stop reason: HOSPADM

## 2020-01-06 RX ORDER — SERTRALINE HYDROCHLORIDE 100 MG/1
100 TABLET, FILM COATED ORAL DAILY
Status: DISCONTINUED | OUTPATIENT
Start: 2020-01-07 | End: 2020-01-09

## 2020-01-06 RX ORDER — CHOLECALCIFEROL (VITAMIN D3) 25 MCG
5000 TABLET ORAL DAILY
Status: DISCONTINUED | OUTPATIENT
Start: 2020-01-07 | End: 2020-01-09 | Stop reason: HOSPADM

## 2020-01-06 RX ORDER — ASPIRIN 81 MG/1
81 TABLET ORAL DAILY
Status: DISCONTINUED | OUTPATIENT
Start: 2020-01-07 | End: 2020-01-09 | Stop reason: HOSPADM

## 2020-01-06 RX ORDER — ENOXAPARIN SODIUM 100 MG/ML
40 INJECTION SUBCUTANEOUS EVERY 24 HOURS
Status: DISCONTINUED | OUTPATIENT
Start: 2020-01-07 | End: 2020-01-07

## 2020-01-06 RX ORDER — IBUPROFEN 200 MG
24 TABLET ORAL
Status: DISCONTINUED | OUTPATIENT
Start: 2020-01-06 | End: 2020-01-09 | Stop reason: HOSPADM

## 2020-01-06 RX ADMIN — SODIUM CHLORIDE, SODIUM LACTATE, POTASSIUM CHLORIDE, AND CALCIUM CHLORIDE 1000 ML: .6; .31; .03; .02 INJECTION, SOLUTION INTRAVENOUS at 10:01

## 2020-01-06 NOTE — TELEPHONE ENCOUNTER
ANTHONY left for Bekah to call this office back re: next labs and next Cytoxan appt.  
Another VM left for Bekah to call this office.  
Call placed to Bekah. Informed her of pt's lab appts on 8/28, 9/11, and infusion appt on 9/12. Appt reminders postal mailed to pt.  
06-Jan-2020 13:44

## 2020-01-06 NOTE — PROGRESS NOTES
"Subjective:       Patient ID: Bessy Nunez is a 59 y.o. male who presents today for a routine clinic visit for CNS vasculitis. He was last seen on 12/3/19. The history has been provided by the patient. He is accompanied by his wife.     HPI:  · DMT: Plan is to start Rituxan soon; he was scheduled to get it on 12/24, but his wife thought this appt was for Cytoxan  · He has not had a UTI since recent admission and hospitalization for sepsis.   · He reports a headache today and, in general, does not feel well.  He rates it 8/10. He has not taken anything for it.   · His appetite has been very low. He has lost about 10lbs in the past 3 weeks.   · His short-term memory seems to be getting worse. Within 1/2 hour of his wife telling him that he had an appointment with me today, he forgot about it.  · He had a fall 3 days ago and was seen in the ER.   · A hospital bed has been ordered. He is getting PT/OT at home with "[employer].   · He lost his hearing aides.   · He has had generalized weakness over the past few weeks. He does not feel able to walk in clinic today.   · He has been sleeping well at night and has been sleeping a lot during the day.   · Bowels were loose a few days ago, and he took Imodium.     SOCIAL HISTORY  Social History     Tobacco Use    Smoking status: Never Smoker    Smokeless tobacco: Never Used   Substance Use Topics    Alcohol use: No     Alcohol/week: 0.0 standard drinks     Frequency: Never     Drinks per session: 1 or 2     Binge frequency: Never    Drug use: No     Living arrangements - the patient lives with his wife   Employment--receives disability           Objective:        1. 25 foot timed walk: He is seated in his wheelchair today. He walks at home from his bed to his chair, but not much else.     Neurologic Exam      He states that the year is 2019. He knows the month is December. He does not know the date. He knows he is at [facility]. He does not know my name. He knows " that we just celebrated [place].     Imaging:     Narrative     EXAMINATION:  MRI BRAIN W WO CONTRAST    CLINICAL HISTORY:  Vasculitis, CNS;    TECHNIQUE:  Multiplanar multisequence MR imaging of the brain was performed before and after the administration of 10 mL Gadavist intravenous contrast.    COMPARISON:  04/15/2019    FINDINGS:  Prominence of the ventricles and sulci compatible with mild generalized cerebral volume loss.  No hydrocephalus.    Moderate chronic microvascular ischemic changes in the supratentorial white matter and jose.  Remote bilateral inferior cerebellar infarcts.  Remote lacunar type infarct in the left basal ganglia.  Prominent perivascular spaces throughout the basal ganglia bilaterally.    No diffusion restriction to suggest an acute infarction.    Multiple punctate foci of prior hemorrhage in the cerebellum as well as the jose.  Distribution suggesting chronic hypertension.  Few scattered supratentorial microhemorrhages.  No parenchymal mass or acute hemorrhage.    Small developmental venous anomaly in the paramedian right parietal lobe.  No abnormal parenchymal or leptomeningeal enhancement.    No extra-axial blood or fluid collections.    The T2 skull base flow voids are preserved.  Bone marrow signal intensity unremarkable.      Impression       Chronic ischemic change as further detailed above, similar to MRI of 04/15/2019.    Multiple small foci of prior hemorrhage in the cerebellum and jose, possible sequela from hypertension.    No evidence of recent infarction or other acute intracranial pathology.      Electronically signed by: Lobito Javier MD  Date: 12/04/2019  Time: 16:40         Labs:     Lab Results   Component Value Date    EQILKPUG02IF 47 03/04/2019    DHXGFVKK64IZ 36 08/15/2018    NYTRLXVE20TR 11 (L) 05/17/2017     Lab Results   Component Value Date    VT9VCXAS 87.9 (H) 08/15/2018    ABSOLUTECD3 1271 08/15/2018    ME8TLDLC 22.6 08/15/2018    ABSOLUTECD8 327 08/15/2018     JK0UQOQZ 61.7 (H) 08/15/2018    ABSOLUTECD4 892 08/15/2018    LABCD48 2.73 08/15/2018     Lab Results   Component Value Date    WBC 6.35 12/16/2019    RBC 3.33 (L) 12/16/2019    HGB 9.6 (L) 12/16/2019    HCT 29.4 (L) 12/16/2019    MCV 88 12/16/2019    MCH 28.8 12/16/2019    MCHC 32.7 12/16/2019    RDW 14.6 (H) 12/16/2019     12/16/2019    MPV 9.6 12/16/2019    GRAN 4.2 12/16/2019    GRAN 66.0 12/16/2019    LYMPH 1.4 12/16/2019    LYMPH 22.0 12/16/2019    MONO 0.6 12/16/2019    MONO 8.7 12/16/2019    EOS 0.2 12/16/2019    BASO 0.03 12/16/2019    EOSINOPHIL 2.5 12/16/2019    BASOPHIL 0.5 12/16/2019     Sodium   Date Value Ref Range Status   12/16/2019 142 136 - 145 mmol/L Final     Potassium   Date Value Ref Range Status   12/16/2019 3.7 3.5 - 5.1 mmol/L Final     Chloride   Date Value Ref Range Status   12/16/2019 109 95 - 110 mmol/L Final     CO2   Date Value Ref Range Status   12/16/2019 24 23 - 29 mmol/L Final     Glucose   Date Value Ref Range Status   12/16/2019 83 70 - 110 mg/dL Final     BUN, Bld   Date Value Ref Range Status   12/16/2019 17 8 - 23 mg/dL Final     Creatinine   Date Value Ref Range Status   12/16/2019 1.1 0.5 - 1.4 mg/dL Final     Calcium   Date Value Ref Range Status   12/16/2019 9.0 8.7 - 10.5 mg/dL Final     Total Protein   Date Value Ref Range Status   12/03/2019 7.0 6.0 - 8.4 g/dL Final     Albumin   Date Value Ref Range Status   12/03/2019 3.7 3.5 - 5.2 g/dL Final   10/11/2013 4.3 3.6 - 5.1 g/dL Final     Comment:     @ Test Performed By:  [facility] M.D., [place],   [address] [place] [address]  [place] #[id number]     Total Bilirubin   Date Value Ref Range Status   12/03/2019 1.3 (H) 0.1 - 1.0 mg/dL Final     Comment:     For infants and newborns, interpretation of results should be based  on gestational age, weight and in agreement with clinical  observations.  Premature Infant recommended reference  ranges:  Up to 24 hours.............<8.0 mg/dL  Up to 48 hours............<12.0 mg/dL  3-5 days..................<15.0 mg/dL  6-29 days.................<15.0 mg/dL       Alkaline Phosphatase   Date Value Ref Range Status   12/03/2019 81 55 - 135 U/L Final     AST   Date Value Ref Range Status   12/03/2019 20 10 - 40 U/L Final     ALT   Date Value Ref Range Status   12/03/2019 15 10 - 44 U/L Final     Anion Gap   Date Value Ref Range Status   12/16/2019 9 8 - 16 mmol/L Final     eGFR if    Date Value Ref Range Status   12/16/2019 >60.0 >60 mL/min/1.73 m^2 Final     eGFR if non    Date Value Ref Range Status   12/16/2019 >60.0 >60 mL/min/1.73 m^2 Final     Comment:     Calculation used to obtain the estimated glomerular filtration  rate (eGFR) is the CKD-EPI equation.          Diagnosis/Assessment/Plan:    1. CNS vasculitis   · Assessment/Plan: Patient seems very fatigued (even a bit lethargic today) and has his eyes closed for the majority of the visit. He appears pale, has HR of 110 at rest, and has lost 10 lbs in the past 3 weeks. He is afebrile. BP is on the low side for his normal range. He has headache rated 8/10. There is concern for anemia, dehydration/failure to thrive. We discussed going to the emergency room to be evaluated and treated with IV fluids. I also recommend that labs be checked for CBC, CMP, UA, ESR, and CRP. I discussed the case with Dr. Durant. I recommend both neurology and rheumatology consults with admission so that he can get Rituxan as inpatient to avoid further delays with outpatient scheduling (if medically cleared). Neurology may consider high dose steroids for concern of worsening CNS vasculitis.     Our visit today lasted 25 minutes, and 100% of this time was spent face to face with the patient. Over 50% of this visit included discussion of the treatment plan/coordination of care. The patient agrees with the plan of care. He will follow up with Dr. Love  in 4 weeks.     Beti Boswell, COURTNEY-BC, MSCN      Problem List Items Addressed This Visit     None

## 2020-01-06 NOTE — ED TRIAGE NOTES
"Medical screening exam completed.  I have conducted a focused provider triage encounter, findings are as follows:    Brief history of present illness:  Patient is a   59-year-old male with a history of CNS vasculitis who presents the ED after being referred by Neurology Clinic.  They recommend admission with Rheumatology and Neurology consult. Please refer to their note.      Currently, patient feels fine, although he does endorse feeling weak.  His family states that he does have memory problems and has been complaining of pain on and off.  On chart review, patient complained of a headache to the Neurology provider which he denies having at this time; states that he had a headache that improved after taking Advil x2 after office visit.    Vitals:    20 1521   BP: 102/63   Pulse: 100   Resp: 18   Temp: 98.5 °F (36.9 °C)   TempSrc: Oral   SpO2: 96%   Weight: 95.2 kg (209 lb 14.1 oz)   Height: 5' 11" (1.803 m)       Pertinent physical exam:  Afebrile. Tachy at 100.   Appears chronically ill. A and O to person, place, and , but not year (, thought new years faith was coming up). Moves all extremities spontaneously. Answering questions appropriately.     Brief workup plan:    Will initiate workup as recommended by Neurology and defer further evaluation treatment to primary ED team.    Preliminary workup initiated; this workup will be continued and followed by the physician or advanced practice provider that is assigned to the patient when roomed.    5:39 PM  Peyton Nolasco PA-C  Emergent Department  [facility]  Spectralink #[id number] or #[phone]      "

## 2020-01-07 ENCOUNTER — EXTERNAL HOME HEALTH (OUTPATIENT)
Dept: HOME HEALTH SERVICES | Facility: HOSPITAL | Age: 60
End: 2020-01-07
Payer: MEDICARE

## 2020-01-07 ENCOUNTER — EXTERNAL HOME HEALTH (OUTPATIENT)
Dept: HOME HEALTH SERVICES | Facility: HOSPITAL | Age: 60
End: 2020-01-07

## 2020-01-07 LAB
ALBUMIN SERPL BCP-MCNC: 3.8 G/DL (ref 3.5–5.2)
ALP SERPL-CCNC: 79 U/L (ref 55–135)
ALT SERPL W/O P-5'-P-CCNC: 37 U/L (ref 10–44)
ANION GAP SERPL CALC-SCNC: 11 MMOL/L (ref 8–16)
AST SERPL-CCNC: 47 U/L (ref 10–40)
BASOPHILS # BLD AUTO: 0.04 K/UL (ref 0–0.2)
BASOPHILS NFR BLD: 0.6 % (ref 0–1.9)
BILIRUB SERPL-MCNC: 0.5 MG/DL (ref 0.1–1)
BUN SERPL-MCNC: 31 MG/DL (ref 8–23)
CALCIUM SERPL-MCNC: 9.7 MG/DL (ref 8.7–10.5)
CHLORIDE SERPL-SCNC: 107 MMOL/L (ref 95–110)
CO2 SERPL-SCNC: 22 MMOL/L (ref 23–29)
CREAT SERPL-MCNC: 2.5 MG/DL (ref 0.5–1.4)
DIFFERENTIAL METHOD: ABNORMAL
EOSINOPHIL # BLD AUTO: 0.1 K/UL (ref 0–0.5)
EOSINOPHIL NFR BLD: 1.6 % (ref 0–8)
ERYTHROCYTE [DISTWIDTH] IN BLOOD BY AUTOMATED COUNT: 13.8 % (ref 11.5–14.5)
EST. GFR  (AFRICAN AMERICAN): 30.9 ML/MIN/1.73 M^2
EST. GFR  (NON AFRICAN AMERICAN): 26.7 ML/MIN/1.73 M^2
GLUCOSE SERPL-MCNC: 102 MG/DL (ref 70–110)
HCT VFR BLD AUTO: 32.8 % (ref 40–54)
HGB BLD-MCNC: 10.5 G/DL (ref 14–18)
IMM GRANULOCYTES # BLD AUTO: 0.03 K/UL (ref 0–0.04)
IMM GRANULOCYTES NFR BLD AUTO: 0.4 % (ref 0–0.5)
LYMPHOCYTES # BLD AUTO: 1.6 K/UL (ref 1–4.8)
LYMPHOCYTES NFR BLD: 24.2 % (ref 18–48)
MAGNESIUM SERPL-MCNC: 1.4 MG/DL (ref 1.6–2.6)
MCH RBC QN AUTO: 28.3 PG (ref 27–31)
MCHC RBC AUTO-ENTMCNC: 32 G/DL (ref 32–36)
MCV RBC AUTO: 88 FL (ref 82–98)
MONOCYTES # BLD AUTO: 0.8 K/UL (ref 0.3–1)
MONOCYTES NFR BLD: 11.4 % (ref 4–15)
NEUTROPHILS # BLD AUTO: 4.2 K/UL (ref 1.8–7.7)
NEUTROPHILS NFR BLD: 61.8 % (ref 38–73)
NRBC BLD-RTO: 0 /100 WBC
PHOSPHATE SERPL-MCNC: 4.3 MG/DL (ref 2.7–4.5)
PLATELET # BLD AUTO: 129 K/UL (ref 150–350)
PMV BLD AUTO: 9.7 FL (ref 9.2–12.9)
POCT GLUCOSE: 106 MG/DL (ref 70–110)
POCT GLUCOSE: 119 MG/DL (ref 70–110)
POCT GLUCOSE: 137 MG/DL (ref 70–110)
POCT GLUCOSE: 187 MG/DL (ref 70–110)
POCT GLUCOSE: 98 MG/DL (ref 70–110)
POTASSIUM SERPL-SCNC: 4 MMOL/L (ref 3.5–5.1)
PROT SERPL-MCNC: 6.7 G/DL (ref 6–8.4)
RBC # BLD AUTO: 3.71 M/UL (ref 4.6–6.2)
SODIUM SERPL-SCNC: 140 MMOL/L (ref 136–145)
WBC # BLD AUTO: 6.77 K/UL (ref 3.9–12.7)

## 2020-01-07 PROCEDURE — 85025 COMPLETE CBC W/AUTO DIFF WBC: CPT

## 2020-01-07 PROCEDURE — 94660 CPAP INITIATION&MGMT: CPT

## 2020-01-07 PROCEDURE — 99223 1ST HOSP IP/OBS HIGH 75: CPT | Mod: AI,GC,, | Performed by: INTERNAL MEDICINE

## 2020-01-07 PROCEDURE — 99900035 HC TECH TIME PER 15 MIN (STAT)

## 2020-01-07 PROCEDURE — 11000001 HC ACUTE MED/SURG PRIVATE ROOM

## 2020-01-07 PROCEDURE — 94761 N-INVAS EAR/PLS OXIMETRY MLT: CPT

## 2020-01-07 PROCEDURE — 97530 THERAPEUTIC ACTIVITIES: CPT

## 2020-01-07 PROCEDURE — 99223 PR INITIAL HOSPITAL CARE,LEVL III: ICD-10-PCS | Mod: AI,GC,, | Performed by: INTERNAL MEDICINE

## 2020-01-07 PROCEDURE — 97165 OT EVAL LOW COMPLEX 30 MIN: CPT

## 2020-01-07 PROCEDURE — 63600175 PHARM REV CODE 636 W HCPCS: Performed by: STUDENT IN AN ORGANIZED HEALTH CARE EDUCATION/TRAINING PROGRAM

## 2020-01-07 PROCEDURE — 99222 1ST HOSP IP/OBS MODERATE 55: CPT | Mod: GC,,, | Performed by: PSYCHIATRY & NEUROLOGY

## 2020-01-07 PROCEDURE — 97161 PT EVAL LOW COMPLEX 20 MIN: CPT

## 2020-01-07 PROCEDURE — 25000003 PHARM REV CODE 250: Performed by: STUDENT IN AN ORGANIZED HEALTH CARE EDUCATION/TRAINING PROGRAM

## 2020-01-07 PROCEDURE — 99222 PR INITIAL HOSPITAL CARE,LEVL II: ICD-10-PCS | Mod: GC,,, | Performed by: PSYCHIATRY & NEUROLOGY

## 2020-01-07 PROCEDURE — 97116 GAIT TRAINING THERAPY: CPT

## 2020-01-07 PROCEDURE — 36415 COLL VENOUS BLD VENIPUNCTURE: CPT

## 2020-01-07 PROCEDURE — 84100 ASSAY OF PHOSPHORUS: CPT

## 2020-01-07 PROCEDURE — 27000190 HC CPAP FULL FACE MASK W/VALVE

## 2020-01-07 PROCEDURE — 83735 ASSAY OF MAGNESIUM: CPT

## 2020-01-07 PROCEDURE — 92610 EVALUATE SWALLOWING FUNCTION: CPT

## 2020-01-07 PROCEDURE — 80053 COMPREHEN METABOLIC PANEL: CPT

## 2020-01-07 PROCEDURE — C9399 UNCLASSIFIED DRUGS OR BIOLOG: HCPCS | Performed by: STUDENT IN AN ORGANIZED HEALTH CARE EDUCATION/TRAINING PROGRAM

## 2020-01-07 RX ORDER — ATENOLOL 50 MG/1
50 TABLET ORAL DAILY
Status: DISCONTINUED | OUTPATIENT
Start: 2020-01-07 | End: 2020-01-09 | Stop reason: HOSPADM

## 2020-01-07 RX ORDER — ACETAMINOPHEN 325 MG/1
650 TABLET ORAL EVERY 6 HOURS PRN
Status: DISCONTINUED | OUTPATIENT
Start: 2020-01-07 | End: 2020-01-09 | Stop reason: HOSPADM

## 2020-01-07 RX ORDER — SODIUM CHLORIDE 0.9 % (FLUSH) 0.9 %
10 SYRINGE (ML) INJECTION
Status: DISCONTINUED | OUTPATIENT
Start: 2020-01-07 | End: 2020-01-09 | Stop reason: HOSPADM

## 2020-01-07 RX ORDER — MAGNESIUM SULFATE HEPTAHYDRATE 40 MG/ML
2 INJECTION, SOLUTION INTRAVENOUS ONCE
Status: COMPLETED | OUTPATIENT
Start: 2020-01-07 | End: 2020-01-07

## 2020-01-07 RX ORDER — HEPARIN SODIUM 5000 [USP'U]/ML
5000 INJECTION, SOLUTION INTRAVENOUS; SUBCUTANEOUS EVERY 8 HOURS
Status: DISCONTINUED | OUTPATIENT
Start: 2020-01-07 | End: 2020-01-09 | Stop reason: HOSPADM

## 2020-01-07 RX ORDER — DILTIAZEM HYDROCHLORIDE 240 MG/1
240 CAPSULE, COATED, EXTENDED RELEASE ORAL DAILY
Status: DISCONTINUED | OUTPATIENT
Start: 2020-01-07 | End: 2020-01-09 | Stop reason: HOSPADM

## 2020-01-07 RX ORDER — SODIUM CHLORIDE 9 MG/ML
INJECTION, SOLUTION INTRAVENOUS CONTINUOUS
Status: ACTIVE | OUTPATIENT
Start: 2020-01-07 | End: 2020-01-08

## 2020-01-07 RX ORDER — INSULIN ASPART 100 [IU]/ML
0-5 INJECTION, SOLUTION INTRAVENOUS; SUBCUTANEOUS
Status: DISCONTINUED | OUTPATIENT
Start: 2020-01-07 | End: 2020-01-09 | Stop reason: HOSPADM

## 2020-01-07 RX ADMIN — INSULIN DETEMIR 16 UNITS: 100 INJECTION, SOLUTION SUBCUTANEOUS at 08:01

## 2020-01-07 RX ADMIN — ATORVASTATIN CALCIUM 40 MG: 20 TABLET, FILM COATED ORAL at 10:01

## 2020-01-07 RX ADMIN — HEPARIN SODIUM 5000 UNITS: 5000 INJECTION, SOLUTION INTRAVENOUS; SUBCUTANEOUS at 02:01

## 2020-01-07 RX ADMIN — DONEPEZIL HYDROCHLORIDE 5 MG: 5 TABLET, FILM COATED ORAL at 08:01

## 2020-01-07 RX ADMIN — ASPIRIN 81 MG: 81 TABLET, DELAYED RELEASE ORAL at 10:01

## 2020-01-07 RX ADMIN — ATENOLOL 50 MG: 50 TABLET ORAL at 10:01

## 2020-01-07 RX ADMIN — DILTIAZEM HYDROCHLORIDE 240 MG: 240 CAPSULE, COATED, EXTENDED RELEASE ORAL at 10:01

## 2020-01-07 RX ADMIN — INSULIN DETEMIR 20 UNITS: 100 INJECTION, SOLUTION SUBCUTANEOUS at 01:01

## 2020-01-07 RX ADMIN — MAGNESIUM SULFATE IN WATER 2 G: 40 INJECTION, SOLUTION INTRAVENOUS at 10:01

## 2020-01-07 RX ADMIN — HEPARIN SODIUM 5000 UNITS: 5000 INJECTION, SOLUTION INTRAVENOUS; SUBCUTANEOUS at 05:01

## 2020-01-07 RX ADMIN — MELATONIN 5000 UNITS: at 10:01

## 2020-01-07 RX ADMIN — SERTRALINE HYDROCHLORIDE 100 MG: 100 TABLET ORAL at 10:01

## 2020-01-07 RX ADMIN — SODIUM CHLORIDE: 0.9 INJECTION, SOLUTION INTRAVENOUS at 05:01

## 2020-01-07 NOTE — PLAN OF CARE
Goals to be met by: 2019    Patient will increase functional independence with mobility by performin. Sit<>stand with RW with SPV.  2. Gait x 150 feet with RW with SPV.    Pt presented supine in bed, wife present.  Pt agreeable to PT.  Bed mobility supine>sit I.  Sit<>stand with SBA.  Gait x 50 ft no AD, pt experience 1 significant LOB requiring CGA to recover, slow justin, wide JOAQUIN.  Gait training with RW x 60ft with SBA, improved/normalized gait using RW, no LOB, improved justin and JOAQUIN.  [place] [facility] [place].  Pt educated in importance of use of RW during gait especially in light of 5 falls in the past 3 months.Wife also encouraging RW use.  Wife reports pt previously using rollator which pt has been having trouble controlling leading to an increased fall risk. Pt has chosen to walk holding on to wife's shoulder resulting in falls of both pt and wife.

## 2020-01-07 NOTE — PLAN OF CARE
Problem: SLP Goal  Goal: SLP Goal  Outcome: Met  Bedside swallow evaluation completed. Pt safe to remain on regular consistency diet and thin liquids.  No further skilled SLP services warranted at this time to address swallowing function.    JULIO Mckeon, CCC-SLP  Speech Language Pathologist  [phone]  1/7/2020

## 2020-01-07 NOTE — CONSULTS
[facility]  Neurology  Consult Note    Patient Name: Bessy Nunez  MRN: [id number]  Admission Date: 1/6/2020  Hospital Length of Stay: 1 days  Code Status: Full Code   Attending Provider: Jayesh Allen MD   Consulting Provider: Saleem Rayo MD  Primary Care Physician: Edgar Nova MD  Principal Problem:CNS vasculitis    Inpatient consult to Neurology  Consult performed by: Saleem Rayo MD  Consult ordered by: Jason Hercules MD         Subjective:     Chief Complaint: Initiation of rituxan therapy     HPI:   60 y/o M with a medical history of CNS vasculitis (diagnosed in 2017 via angiogram-established patient of Dr. Durant and the multiple sclerosis clinic),  CHASE cirrhosis, DMII (A1c 7.4), JOHN on CPAP, and underlying dementia (established patient of Dr. Cross) consulted by hospital medicine for treatment of possible vasculitis flare and initiation of rituxan.     Patient presented to MS clinic yesterday with complaints of a frontal headache that extended throughout the top of his head. He rates it as a 8/10 intensity. Duration is 1-2 hours. Wife states he normally does not experience headaches outside of his CNS vasculitis. Headache was alleviated by advil administration. Currently denies weakness, numbness, or blurry vision at the time of the headache. Besides the headache patient has had a 10 lb weight loss over the past 3 weeks. This is accompanied by generalized weakness, and confusion. He also had several loose bowel movement and increased frequency of fall.     Patient has been on cytoxan since being diagnosed with CNS vasculitis (past two years). Initially tolerated the medication well, but then switched to imuran when he was failing the therapy. Was switched back to cytoxan and was tolerating the medication well. However, for the past couple of months patient has been experiencing falls, confusion and headache. Patient did not receive cytoxan since October (was admitted for sepsis 2/2  UTI). Per last neurology clinic note, patient is going to be switched from cytoxan to rituxan. Dr. Durant from rheumatology is also following the patient .       In the ED, patient afebrile, tachycardic in low 100s with soft /63. He is satting 96% on room air. His labs are significant for normal WBC, H/H stable, elevation in BUN and sCr 25/2.3(baseline 1.1), normal CRP. ESR at 35. UA with 2+protein and 6 RBCs. He did not have any imaging done in the ED. He received 1L LR bolus with improvement in his BP and tachycardia.    On examination today patient states that his headache has resolved. At baseline he uses a walker. Patient is alert and oriented to person and place but not time (thinks its December 7, 2019). PERRLA. 5/5 strength in upper and lower extremities. Did not notice an increased tone. No intranucleolar ophthalmoplegia noted. PEERLA       Past Medical History:   Diagnosis Date    Amblyopia     Cataract     CNS vasculitis 6/2/2017    Follows with Dr. Love By mri.  Several active lesions, many old. 5/13: MRA brain/neck, MRI brain w/wo contrast show no vascular occlusion, multiple foci of hyperintensity in deep cerebral periventricular white matter in pattern of demyelinating process.  5/17: MRI spine performed, no spinal cord lesions. Hospitalization 6/2017. EEG was performed negative for seizures.  Repeat MRI showing new lesion, question whether this was demyelination versus CVA. 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 Findings compatible with a small acute lacunar infarct adjacent to the left frontal horn.  Nonspecific enhancement just inferior to this region and extending into the left basal ganglia, as well as within the inferior aspect of the left cerebellum.  No evidence of a  focal mass. 2.  Extensive increased signal intensity involving the periventricular white matter compatible with demyelinating disease versus vasculitis. 3.  Clinical correlation and followup recommended. 4.  This reportedly flattened in the EPIC and the corpus callosum medical record system.    Mixed hyperlipidemia 11/9/2012    NAFLD (nonalcoholic fatty liver disease) 10/11/2013    CHASE (nonalcoholic steatohepatitis)     Obesity     Stroke     Type 2 diabetes mellitus with diabetic polyneuropathy, with long-term current use of insulin 5/14/2017    Type 2 diabetes mellitus with hyperglycemia, with long-term current use of insulin 10/11/2013       Past Surgical History:   Procedure Laterality Date    COLONOSCOPY N/A 5/21/2019    Procedure: COLONOSCOPY;  Surgeon: Alex Ascencio MD;  Location: [facility];  Service: Endoscopy;  Laterality: N/A;  confirmed appt-sp    INSERTION OF TUNNELED CENTRAL VENOUS CATHETER (CVC) WITH SUBCUTANEOUS PORT N/A 12/7/2018    Procedure: [id number];  Surgeon: Luan Diagnostic Provider;  Location: [facility] OR [address] [place] [address];  Service: Radiology;  Laterality: N/A;    SKIN CANCER EXCISION         Review of patient's allergies indicates:  No Known Allergies      No current facility-administered medications on file prior to encounter.      Current Outpatient Medications on File Prior to Encounter   Medication Sig    alendronate (FOSAMAX) 70 MG tablet Take 1 tablet (70 mg total) by mouth every 7 days. (Patient taking differently: Take 70 mg by mouth every 7 days. Tuesday)    aspirin (ECOTRIN) 81 MG EC tablet Take 81 mg by mouth once daily.    atenolol (TENORMIN) 50 MG tablet Take 1 tablet (50 mg total) by mouth once daily.    atorvastatin (LIPITOR) 40 MG tablet TAKE 1 TABLET BY MOUTH ONCE DAILY    blood sugar diagnostic (TRUE METRIX GLUCOSE TEST STRIP) Strp Test blood sugar four times a day    diltiaZEM (DILT-XR) 240 MG CDCR Take 1 capsule (240 mg total) by mouth once daily.     "donepezil (ARICEPT) 5 MG tablet Take 1 tablet (5 mg total) by mouth every evening. Start with 1/2 tab daily x 1 week then whole tablet maintenance    donepezil (ARICEPT) 5 MG tablet Take 1 tablet (5 mg total) by mouth every evening.    flash glucose sensor (FREESTYLE ARELY 14 DAY SENSOR) Kit Glucose checking 4 times a day    FREESTYLE ARELY 14 DAY READER Misc GLUCOSE TESTING : FASTING AND PRIOR TO MEALS    insulin (BASAGLAR KWIKPEN U-100 INSULIN) glargine 100 units/mL (3mL) SubQ pen Inject 25 Units into the skin once daily. Up to 60 BID with cytoxan    insulin aspart U-100 (NOVOLOG) 100 unit/mL (3 mL) InPn pen Inject 1-10 Units into the skin before meals and at bedtime as needed (Hyperglycemia).    insulin syringe-needle U-100 (INSULIN SYRINGE) 1/2 mL 30 gauge x 5/16 Syrg Use 3x/day    irbesartan (AVAPRO) 300 MG tablet TAKE 1 TABLET BY MOUTH ONCE DAILY IN THE EVENING    lancets 30 gauge Misc Test blood sugar four times a day    liraglutide 0.6 mg/0.1 mL, 18 mg/3 mL, subq PNIJ (VICTOZA 3-TOMASZ) 0.6 mg/0.1 mL (18 mg/3 mL) PnIj Inject 1.8 mg into the skin once daily.    metFORMIN (GLUCOPHAGE-XR) 500 MG 24 hr tablet Take 2 tablets (1,000 mg total) by mouth 2 (two) times daily with meals.    methylphenidate HCl (RITALIN) 10 MG tablet Take 1 tablet (10 mg total) by mouth 2 (two) times daily with meals.    omega-3 fatty acids-vitamin E (FISH OIL) 1,000 mg Cap Take 1 capsule by mouth 2 (two) times daily.    pen needle, diabetic (BD ULTRA-FINE ANA PEN NEEDLE) 32 gauge x 5/32" Ndle 4x daily insulin pen needle, relion    QUEtiapine (SEROQUEL) 25 MG Tab Take 1 tablet (25 mg total) by mouth every evening.    sertraline (ZOLOFT) 100 MG tablet 1.5 tablet daily (Patient taking differently: Take 1 and 1/2 (150 mg) tablet by mouth daily)    vitamin D 1000 units Tab Take 5 tablets (5,000 Units total) by mouth once daily.    alclomethasone (ACLOVATE) 0.05 % cream     ketoconazole (NIZORAL) 2 % cream      Family History "     Problem Relation (Age of Onset)    Cancer Father    Cataracts Mother    Diabetes Maternal Aunt    Heart disease Mother    Heart failure Mother    Hypertension Mother    Rheum arthritis Paternal Grandmother    Stroke Sister        Tobacco Use    Smoking status: Never Smoker    Smokeless tobacco: Never Used   Substance and Sexual Activity    Alcohol use: No     Alcohol/week: 0.0 standard drinks     Frequency: Never     Drinks per session: 1 or 2     Binge frequency: Never    Drug use: No    Sexual activity: Yes     Partners: Female     Review of Systems   Constitutional: Negative for activity change, appetite change, chills, diaphoresis and fever.   HENT: Negative for congestion.    Gastrointestinal: Negative for abdominal distention, abdominal pain, diarrhea, nausea, rectal pain and vomiting.   Musculoskeletal: Positive for gait problem. Negative for arthralgias, back pain and joint swelling.   Psychiatric/Behavioral: Positive for decreased concentration. Negative for agitation, behavioral problems, confusion, hallucinations and self-injury. The patient is not nervous/anxious and is not hyperactive.      Objective:     Vital Signs (Most Recent):  Temp: 98.1 °F (36.7 °C) (01/07/20 1100)  Pulse: 78 (01/07/20 1100)  Resp: 16 (01/07/20 1100)  BP: 136/88 (01/07/20 1100)  SpO2: 97 % (01/07/20 1100) Vital Signs (24h Range):  Temp:  [97.6 °F (36.4 °C)-98.1 °F (36.7 °C)] 98.1 °F (36.7 °C)  Pulse:  [68-96] 78  Resp:  [12-16] 16  SpO2:  [96 %-100 %] 97 %  BP: ()/(52-88) 136/88     Weight: 94.8 kg (209 lb)  Body mass index is 29.15 kg/m².    Physical Exam  General: NAD, well nourished   Eyes: no tearing, discharge, no erythema   Cardiovascular: Warm and well perfused, pulses equal and symmetrical  Lungs: Normal work of breathing, normal chest wall excursions  Skin: No rash, lesions, or breakdown on exposed skin  Abdomen: soft, non tender, non distended  Extremeties: No cyanosis, clubbing or edema.         -------------------------------------------------------------  Facial Expression: normal       Affect: Flat  Orientation to time & place:  Oriented to  Place and person. Not oriented to situation or time        Attention & concentration:  Decreased attention span and concentration       Memory:  Recent and remote memory not intact  Language: Spontaneous, fluent; able to repeat and name objects       Fund of knowledge:  Aware of current events        Speech:  normal (not dysarthric)  -------------------------------------------------------  Cranial nerves: visual fields full, pupils equal round and reactive, extraocular movements intact, facial sensation intact, face symmetrical, hearing intact to whisper, palate raises midline, shoulder shrug strength normal, tongue protrudes midline.        -------------------------------------------------------  Musculoskeletal  Muscle tone: all 4 extremities normal        Muscle Bulk: all 4 extremities normal        Muscle strength:  5/5 in all 4 extremities        No pronator drift  Sensation: Intact to light touch, pin prick, vibration in all extremities        Deep tendon Reflexes: 2+ bilateral biceps, triceps, patella and ankles        --------------------------------------------------------------  Cerebellar and Coordination  Gait:  deferred    Finger-nose: no dysmetria       --------------------------------------------------------------          Significant Labs:   BMP:   Recent Seakeeper   Lab 01/06/20 1743 01/07/20 0629   * 102    140   K 4.2 4.0    107   CO2 23 22*   BUN 25* 31*   CREATININE 2.3* 2.5*   CALCIUM 10.2 9.7   MG  --  1.4*     CBC:   Recent Labs   Lab 01/06/20 1743 01/07/20 0629   WBC 8.31 6.77   HGB 11.4* 10.5*   HCT 33.8* 32.8*   * 129*     CMP:   Recent Labs   Lab 01/06/20 1743 01/07/20 0629   * 102    140   K 4.2 4.0    107   CO2 23 22*   BUN 25* 31*   CREATININE 2.3* 2.5*   CALCIUM 10.2 9.7   MG  --  1.4*    PROT 7.4 6.7   ALBUMIN 3.9 3.8   BILITOT 0.5 0.5   ALKPHOS 86 79   AST 23 47*   ALT 16 37   ANIONGAP 12 11   EGFRNONAA 29.5* 26.7*       Significant Imaging: I have reviewed all pertinent imaging results/findings within the past 24 hours.    Assessment and Plan:     * CNS vasculitis failed imuran; on cytoxan  60 y/o male with a medical history of CNS vasculitis (diagnosed in 2017 via angiogram-established patient of Dr. Durant and the multiple sclerosis clinic),  CHASE cirrhosis, DMII (A1c 7.4), JOHN on CPAP, and underlying dementia (established patient of Dr. Cross) consulted by hospital medicine for treatment of possible vasculitis flare and initiation of rituxan. Patient had initially failed cytoxan hiatus and transitioned to imuran in september 2018. He was then restarted on cytoxan in October 2018 which he was tolerating well. 12/4/19 MRI brain with/without showing chronic ischemic change, similar to MRI of 04/15/2019. Patient initially presented to the MS clinic yesterday due to a 8/10 headache which has resolved with the use of Advil/Aleve. Wife has mentioned that since October 2019 (when he was admitted to the hospital for sepsis 2/2 UTI), he has lost weight (10-15 lbs) and has cognitive decline. Please note that due to the recent hospital admission he was not able to receive his dose of cytoxan. Decision was made to switch him to rituxan. Neurology and rheumatology were consulted with admission so that he can get Rituxan as inpatient to avoid further delays with outpatient scheduling (if medically cleared). However due to his worsening kidney function would hold the treatment.     Plan   1) Hold rituxan therapy until patient's kidney function clears.   2) Hold steroids      MANISH (acute kidney injury)  BUN/Cr elevated- unclear etiology  -Would hold rituxan until MANISH has resolved         VTE Risk Mitigation (From admission, onward)         Ordered     heparin (porcine) injection 5,000 Units  Every 8 hours       01/07/20 0058     IP VTE LOW RISK PATIENT  Once      01/07/20 0122     Place ELIZABETH hose  Until discontinued      01/07/20 0122     Place sequential compression device  Until discontinued      01/06/20 7033                Thank you for your consult. I will follow-up with patient. Please contact us if you have any additional questions.    Saleem Rayo MD  Neurology  [facility]    Attending Attestation:  59 year old male with CNS vasculitis (failed imuran) who is an establish patient in the [facility]. He was diagnosed with CNS vasculitis in 2017 by angiogram. He was previously getting Cytoxan [id number] but last dose was in 10/2018. Wife noticed that his responses to cytoxan was getting shorter-lived with each dose. He presented to ER from clinic due to worsening cognitive decline since a few weeks after his last cytoxan dose in October. Wife states that he is sleeping and balance is worsening which typically happens when he is due for another treatment. He has also had decrease PO intact over the past few weeks. Plan from Neurology and Rheumatology outpatient is to switch to rituximab. He was admitted from clinic yesterday due to continued decline. On admission, she was found to have MANISH. He was initially having headache but it has resolved. On exam: alert, oriented to person, place, not to time (eventually said year is 2020 and month is December). He responded Trump to being the president. Immediate recall 3/3; delayed recall 1/3. Exam nonfocal.  Assessment and plan: CNS vasculitis: failed imuran and cytoxan. Plan is to get rituximab inpatient once medically cleared but currently he has MANISH. Appreciate Rheumatology assistance.

## 2020-01-07 NOTE — PT/OT/SLP EVAL
Physical Therapy Evaluation    Patient Name:  Bessy Nunez   MRN:  [id number]    Recommendations:     Discharge Recommendations:  home health PT   Discharge Equipment Recommendations: walker, rolling, hospital bed(pt with history of falling out of bed)   Barriers to discharge: None    Assessment:     Bessy Nunez is a 59 y.o. male admitted with a medical diagnosis of CNS vasculitis.  He presents with the following impairments/functional limitations:  impaired endurance, impaired functional mobilty, gait instability, impaired balance, impaired cognition, decreased safety awareness .Pt  with functional mobility deficits and should benefit from  PT  to maximize I and safety decrease risk of further decline of injury.    Rehab Prognosis: Good; patient would benefit from acute skilled PT services to address these deficits and reach maximum level of function.    Recent Surgery: * No surgery found *      Plan:     During this hospitalization, patient to be seen 3 x/week to address the identified rehab impairments via gait training, therapeutic activities, therapeutic exercises, neuromuscular re-education and progress toward the following goals:    · Plan of Care Expires:  02/07/20    Subjective     Chief Complaint: pt without c/o  Patient/Family Comments/goals: wife states she is hoping this is a very short stay  Pain/Comfort:  · Pain Rating 1: 0/10    Patients cultural, spiritual, Faith conflicts given the current situation: no    Living Environment:  Pt lives with wife in [address], 1TH step, downstairs set up for pt's needs does not need to go upstairs.  Prior to admission, patients level of function was SPV for transfers, gait holding wife's shoulder with resultant falls of both pt and wife..  Equipment used at home: glucometer(pt has rollator, does not use due to safety concerns).  DME owned (not currently used): single point cane and rollator.  Upon discharge, patient will have assistance from wife.    Objective:      Communicated with nursemickie prior to session.  Patient found supine with peripheral IV  upon PT entry to room.    General Precautions: Standard, fall(poor safety awareness)   Orthopedic Precautions:N/A   Braces: N/A     Exams:  · Cognitive Exam:  Patient is oriented to Person, Place and Situation  · Gross Motor Coordination:  WFL  · Postural Exam:  Patient presented with the following abnormalities:    · -       at baseline  · Sensation:    · -       Intact  · Skin Integrity/Edema:      · -       Skin integrity: Visible skin intact  · RLE ROM: WFL  · RLE Strength: WFL  · LLE ROM: WFL  · LLE Strength: WFL    Functional Mobility:  · Bed Mobility:     · Supine to Sit: independence  · Transfers:     · Sit to Stand:  stand by assistance with rolling walker  · Gait: t x 50 ft no AD, pt experience 1 significant LOB requiring CGA to recover, slow justin, wide JOAQUIN.  Gait training with RW x 60ft with SBA, improved/normalized gait using RW, no LOB, improved justin and JOAQUIN.  · Balance: good sitting dynamic and static.  Static standing;  Good I, dynamic standing; fair+  ·       Therapeutic Activities and Exercises:   Pt presented supine in bed, wife present.  Pt agreeable to PT.  Bed mobility supine>sit I.  Sit<>stand with SBA.  Gait x 50 ft no AD, pt experience 1 significant LOB requiring CGA to recover, slow justin, wide JOAQUIN.  Gait training with RW x 60ft with SBA, improved/normalized gait using RW, no LOB, improved justin and JOAQUIN.  Pt transferred to [facility] with SBA.  Pt educated in importance of use of RW during gait especially in light of 5 falls in the past 3 months.Wife also encouraging RW use.  Wife reports pt previously using rollator which pt has been having trouble controlling leading to an increased fall risk. Pt has chosen to walk holding on to wife's shoulder resulting in falls of both pt and wife.  AM-PAC 6 CLICK MOBILITY  Total Score:19     Patient left up in chair with all lines intact, call button in  reach and wife present.    GOALS:   Multidisciplinary Problems     Physical Therapy Goals        Problem: Physical Therapy Goal    Goal Priority Disciplines Outcome Goal Variances Interventions   Physical Therapy Goal     PT, PT/OT Ongoing, Progressing     Description:  Goals to be met by: 2019    Patient will increase functional independence with mobility by performin. Sit<>stand with RW with SPV.  2. Gait x 150 feet with RW with SPV.                      History:     Past Medical History:   Diagnosis Date    Amblyopia     Cataract     CNS vasculitis 2017    Follows with Dr. Love By mri.  Several active lesions, many old. : MRA brain/neck, MRI brain w/wo contrast show no vascular occlusion, multiple foci of hyperintensity in deep cerebral periventricular white matter in pattern of demyelinating process.  : MRI spine performed, no spinal cord lesions. Hospitalization 2017. EEG was performed negative for seizures.  Repeat MRI showing new lesion, question whether this was demyelination versus CVA. Cytoxan 6/3/17 & 17    Depressive disorder, not elsewhere classified 2012    Essential hypertension 2012    Internuclear ophthalmoplegia of left eye 2017    Lacunar stroke of left subthalamic region 2017 MRI brain: 1. Findings compatible with a small acute lacunar infarct adjacent to the left frontal horn.  Nonspecific enhancement just inferior to this region and extending into the left basal ganglia, as well as within the inferior aspect of the left cerebellum.  No evidence of a focal mass. 2.  Extensive increased signal intensity involving the periventricular white matter compatible with demyelinating disease versus vasculitis. 3.  Clinical correlation and followup recommended. 4.  This reportedly flattened in the EPIC and the corpus callosum medical record system.    Mixed hyperlipidemia 2012    NAFLD (nonalcoholic fatty liver disease)  10/11/2013    CHASE (nonalcoholic steatohepatitis)     Obesity     Stroke     Type 2 diabetes mellitus with diabetic polyneuropathy, with long-term current use of insulin 5/14/2017    Type 2 diabetes mellitus with hyperglycemia, with long-term current use of insulin 10/11/2013       Past Surgical History:   Procedure Laterality Date    COLONOSCOPY N/A 5/21/2019    Procedure: COLONOSCOPY;  Surgeon: Alex Ascencio MD;  Location[facility];  Service: Endoscopy;  Laterality: N/A;  confirmed appt-sp    INSERTION OF TUNNELED CENTRAL VENOUS CATHETER (CVC) WITH SUBCUTANEOUS PORT N/A 12/7/2018    Procedure: [id number];  Surgeon: Luan Diagnostic Provider;  Location: [facility] OR [address] [place] [address];  Service: Radiology;  Laterality: N/A;    SKIN CANCER EXCISION         Time Tracking:     PT Received On: 01/07/20  PT Start Time: 0902     PT Stop Time: 0925  PT Total Time (min): 23 min     Billable Minutes: Evaluation 14 and Gait Training 9      Toan Ramos, PT  01/07/2020

## 2020-01-07 NOTE — HPI
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 He is a poor historian and most of the history obtained from wife. He presented to the ED from neurology clinic with cc of HA. It is in the frontotemporal region and is a 8/10 pain scale and occurred early on the afternoon of 1/7. He describes it as throbbing and progressive but resolved with Advil. Denies weakness, numbness, altered vision.     Wife also reports the patient has had decreased PO intake with 10lb weight loss over past 3 weeks with weakness and confusion increased from baseline along with worsening memory loss, loose stools, and increased falls. Wife is concerned as these symptoms are consistent with vasculitis flares. He has been on cyclophosphamide monthly for abut 2 years with last dose on 10/16/19. Following dose in November was held by hematologist with concern for cardiotoxicity, nephrotoxicity, and further bone marrow damage with suspected declining benefit with relapsing of flare-like symptoms occurring every 4, then 3, than 2 weeks between treatments. He was scheduled to begin Rituximab treatment outpatient but there was confusion with wife who believed he was to continue cyclophosphamide and initiation was held.     Neurologic abnormalities initially noted in 2017 when wife began to notice increasing falls, dizziness, confusion, and intermittent nystagmus. Patient was a  with [employer] but has been unable to return to work since diagnoses with difficulty working with computers. Flares would occur intermittently and randomly until reported excellent responce to cyclophosphamide and Ritalin. Per reports he had previously failed azathioprine therapy. Followed with Dr. Troy with Rheum for  cyclophosphamide therapy. Last seen on 12/19/19 during which time Rituximab therapy was discussed.

## 2020-01-07 NOTE — HPI
58 y/o M with a medical history of CNS vasculitis (diagnosed in 2017 via angiogram-established patient of Dr. Durant and the multiple sclerosis clinic),  CHASE cirrhosis, DMII (A1c 7.4), JOHN on CPAP, and underlying dementia (established patient of Dr. Cross) consulted by hospital medicine for treatment of possible vasculitis flare and initiation of rituxan.     Patient presented to MS clinic yesterday with complaints of a frontal headache that extended throughout the top of his head. He rates it as a 8/10 intensity. Duration is 1-2 hours. Wife states he normally does not experience headaches outside of his CNS vasculitis. Headache was alleviated by advil administration. Currently denies weakness, numbness, or blurry vision at the time of the headache. Besides the headache patient has had a 10 lb weight loss over the past 3 weeks. This is accompanied by generalized weakness, and confusion. He also had several loose bowel movement and increased frequency of fall.     Patient has been on cytoxan since being diagnosed with CNS vasculitis (past two years). Initially tolerated the medication well, but then switched to imuran when he was failing the therapy. Was switched back to cytoxan and was tolerating the medication well. However, for the past couple of months patient has been experiencing falls, confusion and headache. Patient did not receive cytoxan since October (was admitted for sepsis 2/2 UTI). Per last neurology clinic note, patient is going to be switched from cytoxan to rituxan. Dr. Durant from rheumatology is also following the patient .       In the ED, patient afebrile, tachycardic in low 100s with soft /63. He is satting 96% on room air. His labs are significant for normal WBC, H/H stable, elevation in BUN and sCr 25/2.3(baseline 1.1), normal CRP. ESR at 35. UA with 2+protein and 6 RBCs. He did not have any imaging done in the ED. He received 1L LR bolus with improvement in his BP and tachycardia.    On  examination today patient states that his headache has resolved. At baseline he uses a walker. Patient is alert and oriented to person and place but not time (thinks its December 7, 2019). PERRLA. 5/5 strength in upper and lower extremities. Did not notice an increased tone. No intranucleolar ophthalmoplegia noted. DESI

## 2020-01-07 NOTE — ASSESSMENT & PLAN NOTE
A1c 7.4 on 12/4. Home regimen: Insulin basaglar 25 units qhs, Insulin aspart sliding scale, Metformin 500 mg BID, Victoza 1.8mg daily    Plan:  -Hold home oral hyperglycemic agents  -Will continue 80% of long acting insulin--20 U detemir qhs +LDSSI  -Assess sliding scale requirements before introduction of prandial insulin  -Diabetic diet  -POCT glucose ACHS  -Hypoglyemia protocol

## 2020-01-07 NOTE — ASSESSMENT & PLAN NOTE
Patient presents with 2 week history of increased falls, worsened short term memory loss, behavior changes, and 1 day history of headache which has now resolved. On exam the patient has no new focal neurologic deficits. His inflammatory markers are within normal limits. Patient has been taking Cyclophosphamide for about 2 years with last dose in October. Per neurology notes(patient seen by Beti Boswell), he is supposed to be switched to Rituximab. Delay in treatment has been secondary to recent episode of sepsis in the setting of UTI. I do not have evidence to suggest flare right now and patient's headache is resolved.    Plan:  -Will consult neurology and rheumatology per Neurology's clinic note  -q4 neuro checks  -Will hold off on steroid administration at this time  -Could consider repeat MRI  -Will attempt to have patient undergo treatment with Cyclophosphamide or Rituximab during hospitalization if possible pending neurology recommendations

## 2020-01-07 NOTE — H&P
[facility] Hospital Medicine  History & Physical    Patient Name: Bessy Nunez  MRN: [id number]  Admission Date: 1/6/2020  Attending Physician: Jayesh Allen MD  Primary Care Provider: Edgar Nova MD    Hospital Medicine Team: [facility] MED 1 Jason Hercules MD     Patient information was obtained from patient, spouse/SO, past medical records and ER records.     Subjective:     Principal Problem:CNS vasculitis    Chief Complaint:   Chief Complaint   Patient presents with    Headache     sent from neuro clinic, hx vasculitis, MS micki        HPI: Mr. Nunez is a 60 yo male with past medical history of CNS vasculitis, CVA, internuclear opthalmoplegia, CHASE cirrhosis, DMII(A1c 7.4), HTN, HLD, JOHN on CPAP, Dementia, and osteoporosis who presents with chief complaint of headache that occurred this morning. Much of the history is obtained from wife at bedside as patient is a poor historian/has underlying dementia and short term memory loss. The patient experienced 8/10 frontoparietal headache this morning that lasted for about 1-2 hours. He is unable to characterize the headache. His wife says he normally does not experience headaches outside of his CNS vasculitis. His headache essentially went away with advil administration. He did not have any weakness, numbness, blurred vision at the time of the headache.    The patient does not have any complaints at this time, however the wife reports he has not been doing so well for the last 2 weeks. He has had decreased PO intake, 10lb weight loss over the last 3 weeks, generalized weakness, confusion increased from baseline characterized by memory loss, loose bowel movement, and increased frequency of falls. She says that he just does not seem like himself. She says that she is concerned because he has been off of his Cyclophosphamide treatment for his CNS vasculitis and that some of the symptoms he is experiencing such as falls, confusion, headache are  seen with flares. He has been taking cyclophosphamide monthly for about 2 years and has been relatively well controlled on this medication up until recently. He not received it since October due to recent hospital admission for sepsis 2/2 UTI and per Neurology notes, there have been plans to switch him to Rituximab. He follows with Beti Boswell and most recently saw her today. She sent the patient to the ED for inpatient neurology and rheumatology eval and for potential inpatient Rituximab.     In the ED, patient afebrile, tachycardic in low 100s with soft /63. He is satting 96% on room air. His labs are significant for normal WBC, H/H stable, mildly hyponatremic Na 135, elevation in BUN and sCr 25/2.3(baseline 1.1), normal CRP and ESR, UA with 2+protein and 6 RBCs. He did not have any imaging done in the ED. He received 1L LR bolus with improvement in his BP and tachycardia.    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 Love By mri.  Several active lesions, many old. 5/13: MRA brain/neck, MRI brain w/wo contrast show no vascular occlusion, multiple foci of hyperintensity in deep cerebral periventricular white matter in pattern of demyelinating process.  5/17: MRI spine performed, no spinal cord lesions. Hospitalization 6/2017. EEG was performed negative for seizures.  Repeat MRI showing new lesion, question whether this was demyelination versus CVA. 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 Findings compatible with a small acute lacunar infarct adjacent to the left frontal horn.  Nonspecific enhancement just inferior to this region and extending into the left basal ganglia, as well as within the inferior aspect of the  left cerebellum.  No evidence of a focal mass. 2.  Extensive increased signal intensity involving the periventricular white matter compatible with demyelinating disease versus vasculitis. 3.  Clinical correlation and followup recommended. 4.  This reportedly flattened in the EPIC and the corpus callosum medical record system.    Mixed hyperlipidemia 11/9/2012    NAFLD (nonalcoholic fatty liver disease) 10/11/2013    CHASE (nonalcoholic steatohepatitis)     Obesity     Stroke     Type 2 diabetes mellitus with diabetic polyneuropathy, with long-term current use of insulin 5/14/2017    Type 2 diabetes mellitus with hyperglycemia, with long-term current use of insulin 10/11/2013       Past Surgical History:   Procedure Laterality Date    COLONOSCOPY N/A 5/21/2019    Procedure: COLONOSCOPY;  Surgeon: Alex Ascencio MD;  Location: [facility];  Service: Endoscopy;  Laterality: N/A;  confirmed appt-sp    INSERTION OF TUNNELED CENTRAL VENOUS CATHETER (CVC) WITH SUBCUTANEOUS PORT N/A 12/7/2018    Procedure: [id number];  Surgeon: Luan Diagnostic Provider;  Location: [facility] OR [address] [place] [address];  Service: Radiology;  Laterality: N/A;    SKIN CANCER EXCISION         Review of patient's allergies indicates:  No Known Allergies    No current facility-administered medications on file prior to encounter.      Current Outpatient Medications on File Prior to Encounter   Medication Sig    alclomethasone (ACLOVATE) 0.05 % cream     alendronate (FOSAMAX) 70 MG tablet Take 1 tablet (70 mg total) by mouth every 7 days.    aspirin (ECOTRIN) 81 MG EC tablet Take 81 mg by mouth once daily.    atenolol (TENORMIN) 50 MG tablet Take 1 tablet (50 mg total) by mouth once daily.    atorvastatin (LIPITOR) 40 MG tablet TAKE 1 TABLET BY MOUTH ONCE DAILY    blood sugar diagnostic (TRUE METRIX GLUCOSE TEST STRIP) Strp Test blood sugar four times a day    diltiaZEM (DILT-XR) 240 MG CDCR Take 1 capsule (240 mg total) by mouth once daily.  "   donepezil (ARICEPT) 5 MG tablet Take 1 tablet (5 mg total) by mouth every evening. Start with 1/2 tab daily x 1 week then whole tablet maintenance    donepezil (ARICEPT) 5 MG tablet Take 1 tablet (5 mg total) by mouth every evening.    flash glucose sensor (FREESTYLE ARELY 14 DAY SENSOR) Kit Glucose checking 4 times a day    FREESTYLE ARELY 14 DAY READER Misc GLUCOSE TESTING : FASTING AND PRIOR TO MEALS    insulin (BASAGLAR KWIKPEN U-100 INSULIN) glargine 100 units/mL (3mL) SubQ pen Inject 25 Units into the skin once daily. Up to 60 BID with cytoxan    insulin aspart U-100 (NOVOLOG) 100 unit/mL (3 mL) InPn pen Inject 1-10 Units into the skin before meals and at bedtime as needed (Hyperglycemia).    insulin syringe-needle U-100 (INSULIN SYRINGE) 1/2 mL 30 gauge x 5/16 Syrg Use 3x/day    irbesartan (AVAPRO) 300 MG tablet TAKE 1 TABLET BY MOUTH ONCE DAILY IN THE EVENING    ketoconazole (NIZORAL) 2 % cream     lancets 30 gauge Misc Test blood sugar four times a day    liraglutide 0.6 mg/0.1 mL, 18 mg/3 mL, subq PNIJ (VICTOZA 3-TOMASZ) 0.6 mg/0.1 mL (18 mg/3 mL) PnIj Inject 1.8 mg into the skin once daily.    metFORMIN (GLUCOPHAGE-XR) 500 MG 24 hr tablet Take 2 tablets (1,000 mg total) by mouth 2 (two) times daily with meals.    methylphenidate HCl (RITALIN) 10 MG tablet Take 1 tablet (10 mg total) by mouth 2 (two) times daily with meals.    omega-3 fatty acids-vitamin E (FISH OIL) 1,000 mg Cap Take 1 capsule by mouth 2 (two) times daily.    pen needle, diabetic (BD ULTRA-FINE ANA PEN NEEDLE) 32 gauge x 5/32" Ndle 4x daily insulin pen needle, relion    QUEtiapine (SEROQUEL) 25 MG Tab Take 1 tablet (25 mg total) by mouth every evening.    sertraline (ZOLOFT) 100 MG tablet 1.5 tablet daily    vitamin D 1000 units Tab Take 5 tablets (5,000 Units total) by mouth once daily.     Family History     Problem Relation (Age of Onset)    Cancer Father    Cataracts Mother    Diabetes Maternal Aunt    Heart disease " Mother    Heart failure Mother    Hypertension Mother    Rheum arthritis Paternal Grandmother    Stroke Sister        Tobacco Use    Smoking status: Never Smoker    Smokeless tobacco: Never Used   Substance and Sexual Activity    Alcohol use: No     Alcohol/week: 0.0 standard drinks     Frequency: Never     Drinks per session: 1 or 2     Binge frequency: Never    Drug use: No    Sexual activity: Yes     Partners: Female     Review of Systems   Unable to perform ROS: Dementia     Objective:     Vital Signs (Most Recent):  Temp: 98 °F (36.7 °C) (01/06/20 2343)  Pulse: 87 (01/06/20 2343)  Resp: 14 (01/06/20 2343)  BP: (!) 140/76 (01/06/20 2343)  SpO2: 100 % (01/06/20 2343) Vital Signs (24h Range):  Temp:  [97.9 °F (36.6 °C)-98.5 °F (36.9 °C)] 98 °F (36.7 °C)  Pulse:  [] 87  Resp:  [14-18] 14  SpO2:  [96 %-100 %] 100 %  BP: ()/(52-76) 140/76     Weight: 95.2 kg (209 lb 14.1 oz)  Body mass index is 29.27 kg/m².    Physical Exam   Constitutional: He is oriented to person, place, and time. He appears well-developed. No distress.   Comfortable appearing male in no acute distress   HENT:   Head: Normocephalic and atraumatic.   Mouth/Throat: No oropharyngeal exudate.   Dry mucous membranes   Eyes: Pupils are equal, round, and reactive to light. Conjunctivae are normal. No scleral icterus.   Neck: Normal range of motion. Neck supple. No JVD present.   Cardiovascular: Normal rate, regular rhythm, normal heart sounds and intact distal pulses. Exam reveals no gallop and no friction rub.   No murmur heard.  Pulmonary/Chest: Effort normal and breath sounds normal. No respiratory distress. He has no wheezes. He has no rales.   Abdominal: Soft. Bowel sounds are normal. He exhibits no distension. There is no tenderness.   Musculoskeletal: Normal range of motion. He exhibits no edema.   Neurological: He is alert and oriented to person, place, and time.   --He is oriented to person and place but disoriented to date  He  is responding to simple questions appropriately but has difficulty giving detailed history of events  -CN II-XII intact aside from impaired rightward gaze with leftward nystagmus   -5/5 strength in upper and lower extremities  -No deficits in sensation to light touch   -Normal finger to nose and heal to shin   Skin: Skin is warm and dry.        Significant Labs:   CBC:   Recent Labs   Lab 01/06/20  1743   WBC 8.31   HGB 11.4*   HCT 33.8*   *     CMP:   Recent Labs   Lab 01/06/20  1743      K 4.2      CO2 23   *   BUN 25*   CREATININE 2.3*   CALCIUM 10.2   PROT 7.4   ALBUMIN 3.9   BILITOT 0.5   ALKPHOS 86   AST 23   ALT 16   ANIONGAP 12   EGFRNONAA 29.5*     Lactic Acid: No results for input(s): LACTATE in the last 48 hours.  Magnesium: No results for input(s): MG in the last 48 hours.  Urine Studies:   Recent Labs   Lab 01/06/20  2215   COLORU Yellow   APPEARANCEUA Hazy*   PHUR 5.0   SPECGRAV 1.015   PROTEINUA 2+*   GLUCUA Negative   KETONESU Negative   BILIRUBINUA Negative   OCCULTUA 1+*   NITRITE Negative   LEUKOCYTESUR Negative   RBCUA 6*   WBCUA 1   BACTERIA None   SQUAMEPITHEL 0   HYALINECASTS 1       Significant Imaging: I have reviewed and interpreted all pertinent imaging results/findings within the past 24 hours.    Assessment/Plan:     * CNS vasculitis failed imuran; on cytoxan  Patient presents with 2 week history of increased falls, worsened short term memory loss, behavior changes, and 1 day history of headache which has now resolved. On exam the patient has no new focal neurologic deficits. His inflammatory markers are within normal limits. Patient has been taking Cyclophosphamide for about 2 years with last dose in October. Per neurology notes(patient seen by Beti Boswell), he is supposed to be switched to Rituximab. Delay in treatment has been secondary to recent episode of sepsis in the setting of UTI. I do not have evidence to suggest flare right now and patient's headache  is resolved.    Plan:  -Will consult neurology and rheumatology per Neurology's clinic note  -q4 neuro checks  -Will hold off on steroid administration at this time  -Could consider repeat MRI  -Will attempt to have patient undergo treatment with Cyclophosphamide or Rituximab during hospitalization if possible pending neurology recommendations      MANISH (acute kidney injury)  sCr on admission 2.3. Baseline is around 1.1. It is likely pre-renal in etiology in setting of decreased PO intake, low BP, tachycardia, dry on exam. Also on differential is post-obstructive and intrarenal.    Plan:  -LR fluid challenge  -Daily BMP  -Avoid nephrotoxic meds and renally dose all meds  -Holding home ARB  -Strict I/Os and daily standing weights   -Can consider urine lytes and/or US retroperitoneal should patient have no response to fluid administration      Dementia with behavioral disturbance  Medications: donepezil 5 mg qhs, seroquel 25 mg qd, and sertraline 100 mg qd    Plan:  -Continue above medications  -Delirium precautions in place      Osteoporosis with current pathological fracture with routine healing from steroids; compression fx 2017; 4/2019 bisphosphonate  Home medications: Fosamax 70 mg daily and Vitamin D 5000 units daily    Plan:  -Continue home bisphosphonate and Vit D supplementation      Impaired functional mobility, balance, gait, and endurance  Patient's wife reports patient with increased fall frequency. He was recently seen in ED on 1/3 after a fall and was treated for an abrasion on his ear.    Plan:  -Consult PT/OT. Appreciate dispo and DME recommendations.  -Fall precautions      Type 2 diabetes mellitus with diabetic polyneuropathy, with long-term current use of insulin  A1c 7.4 on 12/4. Home regimen: Insulin basaglar 25 units qhs, Insulin aspart sliding scale, Metformin 500 mg BID, Victoza 1.8mg daily    Plan:  -Hold home oral hyperglycemic agents  -Will continue 80% of long acting insulin--20 U detemir  qhs +LDSSI  -Assess sliding scale requirements before introduction of prandial insulin  -Diabetic diet  -POCT glucose ACHS  -Hypoglyemia protocol       JOHN with CPAP at night  -Continue CPAP qhs      Essential hypertension 5/2016 TTE diastolic dysfunction  Home medications: ibresartan 300 mg qhs, atenolol 50 mg qd, and diltiazem 240 mg qd  BP actually low on admission with associated tachycardia--suspect secondary to mild hypovolemia since now improved with LR administration    Plan:  -Continue atenolol and diltiazem   -Hold home ARB in the setting of MANISH      Mixed hyperlipidemia  Home medication: atorvastatin 40 mg    Plan:  -Continue home statin      VTE Risk Mitigation (From admission, onward)         Ordered     heparin (porcine) injection 5,000 Units  Every 8 hours      01/07/20 0058     IP VTE LOW RISK PATIENT  Once      01/07/20 0122     Place ELIZABETH hose  Until discontinued      01/07/20 0122     Place sequential compression device  Until discontinued      01/06/20 [id number]                   Jason Hercules MD  Department of Hospital Medicine   [facility]

## 2020-01-07 NOTE — PT/OT/SLP EVAL
"Occupational Therapy   Evaluation    Name: Bessy Nunez  MRN: [id number]  Admitting Diagnosis:  CNS vasculitis      Recommendations:     Discharge Recommendations: home health OT  Discharge Equipment Recommendations:  walker, rolling, grab bar  Barriers to discharge:  None    Assessment:     Bessy Nunez is a 59 y.o. male with a medical diagnosis of CNS vasculitis.  He presents with impaired ADL and functional mobility performance. Pt familiar to writing therapist with wife explaining pt has made much progress since last admission.  Pt still requires (A) with ADLs and mobilizes with guidance from wife (BUE support on spouse). Pt moving short household distances and refuses to wear rollator. Pt with 5+ falls in the last 3 months. Pt demo SBA at bed mobility and CGA with RW and with HHA. Pt with 1 LOB during HHA mobility trial. Performance deficits affecting function: weakness, impaired sensation, impaired functional mobilty, impaired self care skills, decreased safety awareness, gait instability, impaired balance, impaired cardiopulmonary response to activity.  Pt would benefit from continued OT skilled services 3x/wk to improve daily living skills to optimize QOL. Pt is recommended to discharge to OT at this time.      Rehab Prognosis: Good; patient would benefit from acute skilled OT services to address these deficits and reach maximum level of function.       Plan:     Patient to be seen 3 x/week to address the above listed problems via self-care/home management, therapeutic exercises, therapeutic activities  · Plan of Care Expires: 02/07/20  · Plan of Care Reviewed with: patient, spouse    Subjective     Chief Complaint: None stated   Patient/Family Comments/goals: "Sure I'll go work"    Occupational Profile:  Living Environment: Pt lives with wife in 2  with bedroom/bathroom on first level. Pt with shower/tub combination with bath bench.  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 Pt mobilized with guidance of wife (shoulder walking) and refused to use rollator.   Roles and Routines: Pt enjoys fishing   Equipment Used at Home:  glucometer, bath bench  Assistance upon Discharge: Wife home 24/7    Pain/Comfort:  · Pain Rating 1: 0/10    Patients cultural, spiritual, Religion conflicts given the current situation: no    Objective:     Communicated with: RN prior to session.  Patient found HOB elevated with bed alarm, peripheral IV upon OT entry to room.    General Precautions: Standard, fall   Orthopedic Precautions:N/A   Braces: N/A     Occupational Performance:    Bed Mobility:    · Patient completed Rolling/Turning to Left with  stand by assistance  · Patient completed Scooting/Bridging with stand by assistance  · Patient completed Supine to Sit with stand by assistance    Functional Mobility/Transfers:  · Patient completed Sit <> Stand Transfer with contact guard assistance  with  hand-held assist   · Patient completed Bed <> Chair Transfer using Step Transfer technique with contact guard assistance with rolling walker  · Functional Mobility: Pt initiated mobility evaluation with HHA with 1 LOB and CGA. Pt then switched to using RW with SBA-CGA and no LOB.     Activities of Daily Living:  · Upper Body Dressing: moderate assistance donning gown at EOB   · Lower Body Dressing: total assistance donning socks at EOB     Cognitive/Visual Perceptual:  Cognitive/Psychosocial Skills:     -       Oriented to: Person, Place, Time and Situation   -       Follows Commands/attention:Follows multistep  commands  -       Communication: clear/fluent  -       Memory: No Deficits noted  -       Safety awareness/insight to disability: intact   -       Mood/Affect/Coping skills/emotional control: Pleasant    Physical Exam:  Balance:    -       demo good sitting balance, fair standing balance using RW  Upper Extremity Range of Motion:     -       Right  Upper Extremity: WFL  -       Left Upper Extremity: WFL  Upper Extremity Strength:    -       Right Upper Extremity: WFL  -       Left Upper Extremity: WFL   Strength:    -       Right Upper Extremity: WFL  -       Left Upper Extremity: WFL    AMPAC 6 Click ADL:  AMPAC Total Score: 16    Treatment & Education:  Pt educated on role of occupational therapy, POC, and safety during ADLs and functional mobility. Pt and OT discussed importance of safe, continued mobility to optimize daily living skills. Pt verbalized understanding. White board updated during session. Pt given instruction to call for medical staff/nurse for assistance.     Education:    Patient left up in chair with all lines intact, call button in reach, RN notified and pt's spouse present    GOALS:   Multidisciplinary Problems     Occupational Therapy Goals        Problem: Occupational Therapy Goal    Goal Priority Disciplines Outcome Interventions   Occupational Therapy Goal     OT, PT/OT Ongoing, Progressing    Description:  Goals to be met by: 1/17/19     Patient will increase functional independence with ADLs by performing:      UE Dressing with Set-up Assistance.  LE Dressing with Maximum Assistance.  Grooming while standing with Stand-by Assistance.  Toileting from toilet with Stand-by Assistance for hygiene and clothing management.   Rolling to Bilateral with [place].   Supine to sit with [place].  Step transfer with Stand-by Assistance  Toilet transfer to toilet with Stand-by Assistance.                    History:     Past Medical History:   Diagnosis Date    Amblyopia     Cataract     CNS vasculitis 6/2/2017    Follows with Dr. Love By mri.  Several active lesions, many old. 5/13: MRA brain/neck, MRI brain w/wo contrast show no vascular occlusion, multiple foci of hyperintensity in deep cerebral periventricular white matter in pattern of demyelinating process.  5/17: MRI spine performed, no spinal cord lesions.  Hospitalization 6/2017. EEG was performed negative for seizures.  Repeat MRI showing new lesion, question whether this was demyelination versus CVA. 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 Findings compatible with a small acute lacunar infarct adjacent to the left frontal horn.  Nonspecific enhancement just inferior to this region and extending into the left basal ganglia, as well as within the inferior aspect of the left cerebellum.  No evidence of a focal mass. 2.  Extensive increased signal intensity involving the periventricular white matter compatible with demyelinating disease versus vasculitis. 3.  Clinical correlation and followup recommended. 4.  This reportedly flattened in the EPIC and the corpus [facility] medical record system.    Mixed hyperlipidemia 11/9/2012    NAFLD (nonalcoholic fatty liver disease) 10/11/2013    CHASE (nonalcoholic steatohepatitis)     Obesity     Stroke     Type 2 diabetes mellitus with diabetic polyneuropathy, with long-term current use of insulin 5/14/2017    Type 2 diabetes mellitus with hyperglycemia, with long-term current use of insulin 10/11/2013       Past Surgical History:   Procedure Laterality Date    COLONOSCOPY N/A 5/21/2019    Procedure: COLONOSCOPY;  Surgeon: Alex Ascencio MD;  Location: [facility];  Service: Endoscopy;  Laterality: N/A;  confirmed appt-sp    INSERTION OF TUNNELED CENTRAL VENOUS CATHETER (CVC) WITH SUBCUTANEOUS PORT N/A 12/7/2018    Procedure: [id number];  Surgeon: Luan Diagnostic Provider;  Location: [facility] OR [address] [place] [address];  Service: Radiology;  Laterality: N/A;    SKIN CANCER EXCISION         Time Tracking:     OT Date of Treatment: 01/07/20  OT Start Time: 0902  OT Stop Time: 0925  OT Total Time (min): 23  min    Billable Minutes:Evaluation 10 min  Therapeutic Activity 13 min    Althea Ramos, OT  1/7/2020

## 2020-01-07 NOTE — PLAN OF CARE
Problem: Occupational Therapy Goal  Goal: Occupational Therapy Goal  Description  Goals to be met by: 1/17/19     Patient will increase functional independence with ADLs by performing:      UE Dressing with Set-up Assistance.  LE Dressing with Maximum Assistance.  Grooming while standing with Stand-by Assistance.  Toileting from toilet with Stand-by Assistance for hygiene and clothing management.   Rolling to Bilateral with [place].   Supine to sit with [place].  Step transfer with Stand-by Assistance  Toilet transfer to toilet with Stand-by Assistance.   Evaluated pt and established OT POC.  Continue OT as tolerated.  Althea Ramos OT  1/7/2020    Outcome: Ongoing, Progressing

## 2020-01-07 NOTE — HPI
Mr. Nunez is a 60 yo male with past medical history of CNS vasculitis, CVA, internuclear opthalmoplegia, CHASE cirrhosis, DMII(A1c 7.4), HTN, HLD, JOHN on CPAP, Dementia, and osteoporosis who presents with chief complaint of headache that occurred this morning. Much of the history is obtained from wife at bedside as patient is a poor historian/has underlying dementia and short term memory loss. The patient experienced 8/10 frontoparietal headache this morning that lasted for about 1-2 hours. He is unable to characterize the headache. His wife says he normally does not experience headaches outside of his CNS vasculitis. His headache essentially went away with advil administration. He did not have any weakness, numbness, blurred vision at the time of the headache.    The patient does not have any complaints at this time, however the wife reports he has not been doing so well for the last 2 weeks. He has had decreased PO intake, 10lb weight loss over the last 3 weeks, generalized weakness, confusion increased from baseline characterized by memory loss, loose bowel movement, and increased frequency of falls. She says that he just does not seem like himself. She says that she is concerned because he has been off of his Cyclophosphamide treatment for his CNS vasculitis and that some of the symptoms he is experiencing such as falls, confusion, headache are seen with flares. He has been taking cyclophosphamide monthly for about 2 years and has been relatively well controlled on this medication up until recently. He not received it since October due to recent hospital admission for sepsis 2/2 UTI and per Neurology notes, there have been plans to switch him to Rituximab. He follows with Beti Boswell and most recently saw her today. She sent the patient to the ED for inpatient neurology and rheumatology eval and for potential inpatient Rituximab.     In the ED, patient afebrile, tachycardic in low 100s with soft /63. He  is satting 96% on room air. His labs are significant for normal WBC, H/H stable, mildly hyponatremic Na 135, elevation in BUN and sCr 25/2.3(baseline 1.1), normal CRP and ESR, UA with 2+protein and 6 RBCs. He did not have any imaging done in the ED. He received 1L LR bolus with improvement in his BP and tachycardia.

## 2020-01-07 NOTE — PT/OT/SLP EVAL
Speech Language Pathology Evaluation/Discharge  Bedside Swallow    Patient Name:  Bessy Nunez   MRN:  [id number]  Admitting Diagnosis: CNS vasculitis    Recommendations:                 General Recommendations:  Follow-up not indicated  Diet recommendations:  Regular, Thin   Aspiration Precautions: HOB to 90 degrees, Monitor for s/s of aspiration and Standard aspiration precautions   General Precautions: Standard, fall  Communication strategies:  provide increased time to answer    History:     Past Medical History:   Diagnosis Date    Amblyopia     Cataract     CNS vasculitis 6/2/2017    Follows with Dr. Love By mri.  Several active lesions, many old. 5/13: MRA brain/neck, MRI brain w/wo contrast show no vascular occlusion, multiple foci of hyperintensity in deep cerebral periventricular white matter in pattern of demyelinating process.  5/17: MRI spine performed, no spinal cord lesions. Hospitalization 6/2017. EEG was performed negative for seizures.  Repeat MRI showing new lesion, question whether this was demyelination versus CVA. 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 Findings compatible with a small acute lacunar infarct adjacent to the left frontal horn.  Nonspecific enhancement just inferior to this region and extending into the left basal ganglia, as well as within the inferior aspect of the left cerebellum.  No evidence of a focal mass. 2.  Extensive increased signal intensity involving the periventricular white matter compatible with demyelinating disease versus vasculitis. 3.  Clinical correlation and followup recommended. 4.  This reportedly flattened in the EPIC and the [facility] medical record system.    Mixed hyperlipidemia 11/9/2012    NAFLD (nonalcoholic fatty liver  disease) 10/11/2013    CHASE (nonalcoholic steatohepatitis)     Obesity     Stroke     Type 2 diabetes mellitus with diabetic polyneuropathy, with long-term current use of insulin 5/14/2017    Type 2 diabetes mellitus with hyperglycemia, with long-term current use of insulin 10/11/2013       Past Surgical History:   Procedure Laterality Date    COLONOSCOPY N/A 5/21/2019    Procedure: COLONOSCOPY;  Surgeon: Alex Ascencio MD;  Location[facility];  Service: Endoscopy;  Laterality: N/A;  confirmed appt-sp    INSERTION OF TUNNELED CENTRAL VENOUS CATHETER (CVC) WITH SUBCUTANEOUS PORT N/A 12/7/2018    Procedure: [id number];  Surgeon: Luan Diagnostic Provider;  Location: [facility] OR [address] [place] [address];  Service: Radiology;  Laterality: N/A;    SKIN CANCER EXCISION       HPI: Mr. Nunez is a 60 yo male with past medical history of CNS vasculitis, CVA, internuclear opthalmoplegia, CHASE cirrhosis, DMII(A1c 7.4), HTN, HLD, JOHN on CPAP, Dementia, and osteoporosis who presents with chief complaint of headache that occurred this morning. Much of the history is obtained from wife at bedside as patient is a poor historian/has underlying dementia and short term memory loss. The patient experienced 8/10 frontoparietal headache this morning that lasted for about 1-2 hours. He is unable to characterize the headache. His wife says he normally does not experience headaches outside of his CNS vasculitis. His headache essentially went away with advil administration. He did not have any weakness, numbness, blurred vision at the time of the headache.     The patient does not have any complaints at this time, however the wife reports he has not been doing so well for the last 2 weeks. He has had decreased PO intake, 10lb weight loss over the last 3 weeks, generalized weakness, confusion increased from baseline characterized by memory loss, loose bowel movement, and increased frequency of falls. She says that he just does not seem like  himself. She says that she is concerned because he has been off of his Cyclophosphamide treatment for his CNS vasculitis and that some of the symptoms he is experiencing such as falls, confusion, headache are seen with flares. He has been taking cyclophosphamide monthly for about 2 years and has been relatively well controlled on this medication up until recently. He not received it since October due to recent hospital admission for sepsis 2/2 UTI and per Neurology notes, there have been plans to switch him to Rituximab. He follows with Beti Boswell and most recently saw her today. She sent the patient to the ED for inpatient neurology and rheumatology eval and for potential inpatient Rituximab.      In the ED, patient afebrile, tachycardic in low 100s with soft /63. He is satting 96% on room air. His labs are significant for normal WBC, H/H stable, mildly hyponatremic Na 135, elevation in BUN and sCr 25/2.3(baseline 1.1), normal CRP and ESR, UA with 2+protein and 6 RBCs. He did not have any imaging done in the ED. He received 1L LR bolus with improvement in his BP and tachycardia.     Prior diet: regular consistencies/thin liquids    Subjective     Pt seen at bedside with wife present.    Pain/Comfort:  · Pain Rating 1: 0/10    Objective:     Oral Musculature Evaluation  · Oral Musculature: WNL  · Dentition: present and adequate  · Secretion Management: adequate  · Mucosal Quality: good  · Mandibular Strength and Mobility: WNL  · Oral Labial Strength and Mobility: WNL  · Lingual Strength and Mobility: WNL  · Velar Elevation: WNL  · Buccal Strength and Mobility: WNL  · Volitional Cough: strong  · Volitional Swallow: elicited  · Voice Prior to PO Intake: dry, clear    Bedside Swallow Eval:   Consistencies Assessed:  · Thin liquids cup sip x 1, approx 2oz via straw  · Solids 1/2 cracker      Oral Phase:   · WNL    Pharyngeal Phase:   · no overt clinical signs/symptoms of aspiration  · no overt clinical  signs/symptoms of pharyngeal dysphagia    Compensatory Strategies  · None    Assessment:     Bessy Nunez is a 59 y.o. male.  Oral and pharyngeal phases of the swallow appear to be WFL/WNL.  Pt safe to remain on current diet.  No further skilled SLP services warranted at this time to address swallowing function.     Goals:   Multidisciplinary Problems     SLP Goals     Not on file          Multidisciplinary Problems (Resolved)        Problem: SLP Goal    Goal Priority Disciplines Outcome   SLP Goal   (Resolved)     SLP Met                   Plan:     · Plan of Care reviewed with:  patient, spouse   · SLP Follow-Up:  No       Discharge recommendations:  (no further skilled SLP services warranted at this time to address swallowing function)     Time Tracking:     SLP Treatment Date:   01/07/20  Speech Start Time:  0830  Speech Stop Time:  0838     Speech Total Time (min):  8 min    Billable Minutes: Eval Swallow and Oral Function 8    JULIO Mckeon, CCC-SLP  01/07/2020     JULIO Mckeon, CCC-SLP  Speech Language Pathologist  [phone]  1/7/2020

## 2020-01-07 NOTE — ASSESSMENT & PLAN NOTE
Patient's wife reports patient with increased fall frequency. He was recently seen in ED on 1/3 after a fall and was treated for an abrasion on his ear.    Plan:  -Consult PT/OT. Appreciate dispo and DME recommendations.  -Fall precautions

## 2020-01-07 NOTE — ASSESSMENT & PLAN NOTE
Home medications: ibresartan 300 mg qhs, atenolol 50 mg qd, and diltiazem 240 mg qd  BP actually low on admission with associated tachycardia--suspect secondary to mild hypovolemia since now improved with LR administration    Plan:  -Continue atenolol and diltiazem   -Hold home ARB in the setting of MANISH

## 2020-01-07 NOTE — ED PROVIDER NOTES
Encounter Date: 1/6/2020       History     Chief Complaint   Patient presents with    Headache     sent from neuro clinic, hx vasculitis, m[place], [place]     Mr. Nunez is a 59-year-old gentleman with PMH CNS vasculitis, stroke, internuclear ophthalmoplegia, CHASE, DM II, HTN, and HLD who went to his routine follow-up appointment with neurology for his CNS vasculitis and was sent to the ED by neurology for admission for failure to thrive, with recommendations for inpatient consults to neurology and rheumatology and expeditious administration of cyclophosphamide if medically appropriate after missing his last scheduled cyclophosphamide dose on 12/24. Patient complained of 8/10 sharp headache extending across the top of his head around 11:45 AM this morning that lasted several hours but resolved temporarily with 2 advil, temporarily returned, and then spontaneously resolved and is no longer present. This is not the first time he has had a headache like this. He also reports confusion, confirmed by his wife who is at the bedside, that is worse today than previously, though he is confused at baseline. ROS positive for vision changes since August of 2019, dysuria, and one episode of loose stool yesterday. He denies chest pain, dyspnea, abdominal pain, nausea or vomiting.     He has a port and has had a skin cancer excision. Denies family history of vasculitis. He denies smoking cigarettes, drinks 5 drinks per month, and reports remote illicit drug use. Home medications include aclomethasone, alendronate 70 once a week, ASA 81, atenolol 50, atorvastatin 40, diltiazem 240, glargine 25 with sliding scale insulin with meals, irbesartan 300 QHS, liraglutide 1.8, metformin 1,000 BID, methylphenidate 10 BID, omega fatty acid, quetiapine 25 QHS, sertraline 100, and vitamin D 5,000. Denies drug allergies.     History obtained from patient and patient's wife.         Review of patient's allergies indicates:  No Active Allergies  Past  Medical History:   Diagnosis Date    Amblyopia     Cataract     CNS vasculitis 6/2/2017    Follows with Dr. Love By mri.  Several active lesions, many old. 5/13: MRA brain/neck, MRI brain w/wo contrast show no vascular occlusion, multiple foci of hyperintensity in deep cerebral periventricular white matter in pattern of demyelinating process.  5/17: MRI spine performed, no spinal cord lesions. Hospitalization 6/2017. EEG was performed negative for seizures.  Repeat MRI showing new lesion, question whether this was demyelination versus CVA. 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 Findings compatible with a small acute lacunar infarct adjacent to the left frontal horn.  Nonspecific enhancement just inferior to this region and extending into the left basal ganglia, as well as within the inferior aspect of the left cerebellum.  No evidence of a focal mass. 2.  Extensive increased signal intensity involving the periventricular white matter compatible with demyelinating disease versus vasculitis. 3.  Clinical correlation and followup recommended. 4.  This reportedly flattened in the EPIC and the corpus callosum medical record system.    Mixed hyperlipidemia 11/9/2012    NAFLD (nonalcoholic fatty liver disease) 10/11/2013    CHASE (nonalcoholic steatohepatitis)     Obesity     Stroke     Type 2 diabetes mellitus with diabetic polyneuropathy, with long-term current use of insulin 5/14/2017    Type 2 diabetes mellitus with hyperglycemia, with long-term current use of insulin 10/11/2013     Past Surgical History:   Procedure Laterality Date    COLONOSCOPY N/A 5/21/2019    Procedure: COLONOSCOPY;  Surgeon: Alex Ascencio MD;  Location: [facility];  Service: Endoscopy;  Laterality: N/A;  confirmed  appt-sp    INSERTION OF TUNNELED CENTRAL VENOUS CATHETER (CVC) WITH SUBCUTANEOUS PORT N/A 12/7/2018    Procedure: [id number];  Surgeon: Luan Diagnostic Provider;  Location: [facility] OR [address] [place] [address];  Service: Radiology;  Laterality: N/A;    SKIN CANCER EXCISION       Family History   Problem Relation Age of Onset    Heart disease Mother     Hypertension Mother     Heart failure Mother     Cataracts Mother     Cancer Father         lung    Diabetes Maternal Aunt     Stroke Sister     Rheum arthritis Paternal Grandmother     Amblyopia Neg Hx     Blindness Neg Hx     Glaucoma Neg Hx     Macular degeneration Neg Hx     Retinal detachment Neg Hx     Strabismus Neg Hx      Social History     Tobacco Use    Smoking status: Never Smoker    Smokeless tobacco: Never Used   Substance Use Topics    Alcohol use: No     Alcohol/week: 0.0 standard drinks     Frequency: Never     Drinks per session: 1 or 2     Binge frequency: Never    Drug use: No     Review of Systems   Constitutional: Positive for fatigue. Negative for diaphoresis.   Eyes: Positive for visual disturbance. Negative for redness.   Respiratory: Negative for chest tightness and shortness of breath.    Cardiovascular: Negative for chest pain and leg swelling.   Gastrointestinal: Positive for diarrhea (1 episode of loose stool on day prior to admission). Negative for abdominal pain, nausea and vomiting.   Genitourinary: Positive for dysuria.   Musculoskeletal: Negative for neck pain and neck stiffness.   Skin: Negative for rash and wound.   Allergic/Immunologic: Negative for environmental allergies and food allergies.   Neurological: Positive for headaches. Negative for syncope and light-headedness.   Psychiatric/Behavioral: Positive for confusion. Negative for agitation.       Physical Exam     Initial Vitals [01/06/20 1521]   BP Pulse Resp Temp SpO2   102/63 100 18 98.5 °F (36.9 °C) 96 %      MAP       --         Physical  Exam    Constitutional: He is not diaphoretic. No distress.   HENT:   Head: Normocephalic and atraumatic.   Dry mucous membranes   Eyes: Conjunctivae and EOM are normal. Pupils are equal, round, and reactive to light. No scleral icterus.   Neck: Normal range of motion. Neck supple.   Cardiovascular: Normal rate, regular rhythm, normal heart sounds and intact distal pulses.   Pulmonary/Chest: Breath sounds normal. No stridor. No respiratory distress. He has no wheezes. He has no rales.   Abdominal: Soft. Bowel sounds are normal. He exhibits no distension. There is no tenderness.   Musculoskeletal: He exhibits no edema or tenderness.   Neurological: He is alert. He has normal strength. No cranial nerve deficit or sensory deficit. GCS eye subscore is 4. GCS verbal subscore is 4. GCS motor subscore is 6.   Oriented to person, place, thinks it's December of 2019, knows the president, normal finger to nose, negative meningeal signs, negative Brudzinski and negative Kernig   Skin: Skin is warm and dry. No rash noted. No erythema.         ED Course   Procedures  Labs Reviewed   CBC W/ AUTO DIFFERENTIAL - Abnormal; Notable for the following components:       Result Value    RBC 3.94 (*)     Hemoglobin 11.4 (*)     Hematocrit 33.8 (*)     Platelets 129 (*)     All other components within normal limits   COMPREHENSIVE METABOLIC PANEL - Abnormal; Notable for the following components:    Glucose 125 (*)     BUN, Bld 25 (*)     Creatinine 2.3 (*)     eGFR if  34.2 (*)     eGFR if non  29.5 (*)     All other components within normal limits   SEDIMENTATION RATE - Abnormal; Notable for the following components:    Sed Rate 35 (*)     All other components within normal limits   C-REACTIVE PROTEIN   URINALYSIS, REFLEX TO URINE CULTURE          Imaging Results    None          Medical Decision Making:   History:   I obtained history from: someone other than patient.  Old Medical Records: I decided to  obtain old medical records.  Old Records Summarized: records from clinic visits.  Initial Assessment:   59yoM with PMH CNS vasculitis, stroke, internuclear ophthalmoplegia, CHASE, DM II, HTN, and HLD who went to his routine follow-up appointment with neurology for his CNS vasculitis and was sent to the ED by neurology for admission for failure to thrive, with recommendations for inpatient consults to neurology and rheumatology and expeditious administration of cyclophosphamide if medically appropriate after missing his last scheduled cyclophosphamide dose on 12/24. Patient complained of headache and worsening confusion. Found to have MANISH with creatinine 2.3 (previously 1.1). Dry mucous membranes on exam. Neurologic exam normal.   Differential Diagnosis:   CNS vasculitis, tension headache, migraine, dehydration, failure to thrive, acute renal failure   Clinical Tests:   Lab Tests: Reviewed and Ordered       <> Summary of Lab: CBC without leukocytosis. Hemoglobin 11.4 (previously 9.6). Platelets 129 (previously 187). CMP with BUN 25 and creatinine 2.3 (previously 1.1). CRP WNL. ESR elevated to 35. Urinalysis and urine culture pending.  ED Management:  Labs ordered and reviewed as described above. 1 L bolus of LR ordered. To be admitted to IM1.  Other:   I discussed test(s) with the performing physician.  I have discussed this case with another health care provider.              Attending Attestation:   Physician Attestation Statement for Resident:  As the supervising MD   Physician Attestation Statement: I have personally seen and examined this patient.   I agree with the above history. -: Mr. Nunez is a 59-year-old male history of CNS vasculitis here today for admission for failure to thrive and intractable headache.  They have also requested inpatient Neurology and Rheumatology consult.   As the supervising MD I agree with the above PE.    As the supervising MD I agree with the above treatment, course, plan, and  disposition.   -: Labs today with no leukocytosis.  H&H stable. ESR mildly elevated at 35.  CRP normal.  UA negative.  CMP with MANISH.  Patient treated with IV fluids, admitted to Hospital Medicine.  I have reviewed and agree with the residents interpretation of the following: lab data.                                   Clinical Impression:       ICD-10-CM ICD-9-CM   1. Acute renal failure, unspecified acute renal failure type N17.9 584.9   2. Failure to thrive in adult R62.7 783.7   3. Dehydration E86.0 276.51   4. Acute nonintractable headache, unspecified headache type R51 784.0         Disposition:   Disposition: Admitted                     Harriett Pa MD  Resident  01/06/20 2312       Veronika Younger MD  01/07/20 0033

## 2020-01-07 NOTE — ASSESSMENT & PLAN NOTE
Bessy Nunez is 60 yo M with CNS vasculitis diagnosed on MRI. Patient appeared comfortable and AO to self, place, date, and president. Patient is non toxic appearing and afebrile. Hemodynamically stable. CBC without leukocytosis. Most recent MRI on 12/4 with consistent and chronic ischemic changes as noted previously. Patient has failed azathioprine therapy per previous notes. Cyclophosphamide, although successful, is becoming less so with decreasing remission of cognitive flares between treatments. Renal damage is apparent with sCr 2.3 (increased to 2.5 on 1/7) on admission, however, patient has also had poor appetite over the past several weeks and may be prerenal in etiology. May be postrenal in the setting of incontinence. Baseline appears to be between 1.0-1.2. Regardless, per hematology it was time to pursue a different therapy with Rituximab.    In December, patient had a negative hepatitis panel, negative HIV, negative quant gold, UA/micro not concerning for infxn      PLAN  -- Recommend addressing renal impairment and will not start Rituximab until this resolved.   -- When this is resolved will consider starting Rituximab as desired by outpatient providers  -- Underlying infectious process otherwise ruled out with negative workup and non toxic appearance on presentation.

## 2020-01-07 NOTE — SUBJECTIVE & OBJECTIVE
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 Love By mri.  Several active lesions, many old. 5/13: MRA brain/neck, MRI brain w/wo contrast show no vascular occlusion, multiple foci of hyperintensity in deep cerebral periventricular white matter in pattern of demyelinating process.  5/17: MRI spine performed, no spinal cord lesions. Hospitalization 6/2017. EEG was performed negative for seizures.  Repeat MRI showing new lesion, question whether this was demyelination versus CVA. 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 Findings compatible with a small acute lacunar infarct adjacent to the left frontal horn.  Nonspecific enhancement just inferior to this region and extending into the left basal ganglia, as well as within the inferior aspect of the left cerebellum.  No evidence of a focal mass. 2.  Extensive increased signal intensity involving the periventricular white matter compatible with demyelinating disease versus vasculitis. 3.  Clinical correlation and followup recommended. 4.  This reportedly flattened in the EPIC and the corpus callosum medical record system.    Mixed hyperlipidemia 11/9/2012    NAFLD (nonalcoholic fatty liver disease) 10/11/2013    CHASE (nonalcoholic steatohepatitis)     Obesity     Stroke     Type 2 diabetes mellitus with diabetic polyneuropathy, with long-term current use of insulin 5/14/2017    Type 2 diabetes mellitus with hyperglycemia, with long-term current use of insulin 10/11/2013       Past Surgical History:   Procedure Laterality Date    COLONOSCOPY N/A 5/21/2019    Procedure: COLONOSCOPY;  Surgeon: Alex Ascencio MD;  Location: [facility];  Service: Endoscopy;  Laterality: N/A;   confirmed appt-sp    INSERTION OF TUNNELED CENTRAL VENOUS CATHETER (CVC) WITH SUBCUTANEOUS PORT N/A 12/7/2018    Procedure: [id number];  Surgeon: Brucec Diagnostic Provider;  Location: [facility] OR [address] [place] [address];  Service: Radiology;  Laterality: N/A;    SKIN CANCER EXCISION         Review of patient's allergies indicates:  No Known Allergies    No current facility-administered medications on file prior to encounter.      Current Outpatient Medications on File Prior to Encounter   Medication Sig    alclomethasone (ACLOVATE) 0.05 % cream     alendronate (FOSAMAX) 70 MG tablet Take 1 tablet (70 mg total) by mouth every 7 days.    aspirin (ECOTRIN) 81 MG EC tablet Take 81 mg by mouth once daily.    atenolol (TENORMIN) 50 MG tablet Take 1 tablet (50 mg total) by mouth once daily.    atorvastatin (LIPITOR) 40 MG tablet TAKE 1 TABLET BY MOUTH ONCE DAILY    blood sugar diagnostic (TRUE METRIX GLUCOSE TEST STRIP) Strp Test blood sugar four times a day    diltiaZEM (DILT-XR) 240 MG CDCR Take 1 capsule (240 mg total) by mouth once daily.    donepezil (ARICEPT) 5 MG tablet Take 1 tablet (5 mg total) by mouth every evening. Start with 1/2 tab daily x 1 week then whole tablet maintenance    donepezil (ARICEPT) 5 MG tablet Take 1 tablet (5 mg total) by mouth every evening.    flash glucose sensor (FREESTYLE ARELY 14 DAY SENSOR) Kit Glucose checking 4 times a day    FREESTYLE ARELY 14 DAY READER Misc GLUCOSE TESTING : FASTING AND PRIOR TO MEALS    insulin (BASAGLAR KWIKPEN U-100 INSULIN) glargine 100 units/mL (3mL) SubQ pen Inject 25 Units into the skin once daily. Up to 60 BID with cytoxan    insulin aspart U-100 (NOVOLOG) 100 unit/mL (3 mL) InPn pen Inject 1-10 Units into the skin before meals and at bedtime as needed (Hyperglycemia).    insulin syringe-needle U-100 (INSULIN SYRINGE) 1/2 mL 30 gauge x 5/16 Syrg Use 3x/day    irbesartan (AVAPRO) 300 MG tablet TAKE 1 TABLET BY MOUTH ONCE DAILY IN THE EVENING     "ketoconazole (NIZORAL) 2 % cream     lancets 30 gauge Misc Test blood sugar four times a day    liraglutide 0.6 mg/0.1 mL, 18 mg/3 mL, subq PNIJ (VICTOZA 3-TOMASZ) 0.6 mg/0.1 mL (18 mg/3 mL) PnIj Inject 1.8 mg into the skin once daily.    metFORMIN (GLUCOPHAGE-XR) 500 MG 24 hr tablet Take 2 tablets (1,000 mg total) by mouth 2 (two) times daily with meals.    methylphenidate HCl (RITALIN) 10 MG tablet Take 1 tablet (10 mg total) by mouth 2 (two) times daily with meals.    omega-3 fatty acids-vitamin E (FISH OIL) 1,000 mg Cap Take 1 capsule by mouth 2 (two) times daily.    pen needle, diabetic (BD ULTRA-FINE ANA PEN NEEDLE) 32 gauge x 5/32" Ndle 4x daily insulin pen needle, relion    QUEtiapine (SEROQUEL) 25 MG Tab Take 1 tablet (25 mg total) by mouth every evening.    sertraline (ZOLOFT) 100 MG tablet 1.5 tablet daily    vitamin D 1000 units Tab Take 5 tablets (5,000 Units total) by mouth once daily.     Family History     Problem Relation (Age of Onset)    Cancer Father    Cataracts Mother    Diabetes Maternal Aunt    Heart disease Mother    Heart failure Mother    Hypertension Mother    Rheum arthritis Paternal Grandmother    Stroke Sister        Tobacco Use    Smoking status: Never Smoker    Smokeless tobacco: Never Used   Substance and Sexual Activity    Alcohol use: No     Alcohol/week: 0.0 standard drinks     Frequency: Never     Drinks per session: 1 or 2     Binge frequency: Never    Drug use: No    Sexual activity: Yes     Partners: Female     Review of Systems   Unable to perform ROS: Dementia     Objective:     Vital Signs (Most Recent):  Temp: 98 °F (36.7 °C) (01/06/20 2343)  Pulse: 87 (01/06/20 2343)  Resp: 14 (01/06/20 2343)  BP: (!) 140/76 (01/06/20 2343)  SpO2: 100 % (01/06/20 2343) Vital Signs (24h Range):  Temp:  [97.9 °F (36.6 °C)-98.5 °F (36.9 °C)] 98 °F (36.7 °C)  Pulse:  [] 87  Resp:  [14-18] 14  SpO2:  [96 %-100 %] 100 %  BP: ()/(52-76) 140/76     Weight: 95.2 kg (209 lb " 14.1 oz)  Body mass index is 29.27 kg/m².    Physical Exam   Constitutional: He is oriented to person, place, and time. He appears well-developed. No distress.   Comfortable appearing male in no acute distress   HENT:   Head: Normocephalic and atraumatic.   Mouth/Throat: No oropharyngeal exudate.   Dry mucous membranes   Eyes: Pupils are equal, round, and reactive to light. Conjunctivae are normal. No scleral icterus.   Neck: Normal range of motion. Neck supple. No JVD present.   Cardiovascular: Normal rate, regular rhythm, normal heart sounds and intact distal pulses. Exam reveals no gallop and no friction rub.   No murmur heard.  Pulmonary/Chest: Effort normal and breath sounds normal. No respiratory distress. He has no wheezes. He has no rales.   Abdominal: Soft. Bowel sounds are normal. He exhibits no distension. There is no tenderness.   Musculoskeletal: Normal range of motion. He exhibits no edema.   Neurological: He is alert and oriented to person, place, and time.   --He is oriented to person and place but disoriented to date  He is responding to simple questions appropriately but has difficulty giving detailed history of events  -CN II-XII intact aside from impaired rightward gaze with leftward nystagmus   -5/5 strength in upper and lower extremities  -No deficits in sensation to light touch   -Normal finger to nose and heal to shin   Skin: Skin is warm and dry.        Significant Labs:   CBC:   Recent Labs   Lab 01/06/20  1743   WBC 8.31   HGB 11.4*   HCT 33.8*   *     CMP:   Recent Labs   Lab 01/06/20  1743      K 4.2      CO2 23   *   BUN 25*   CREATININE 2.3*   CALCIUM 10.2   PROT 7.4   ALBUMIN 3.9   BILITOT 0.5   ALKPHOS 86   AST 23   ALT 16   ANIONGAP 12   EGFRNONAA 29.5*     Lactic Acid: No results for input(s): LACTATE in the last 48 hours.  Magnesium: No results for input(s): MG in the last 48 hours.  Urine Studies:   Recent Labs   Lab 01/06/20  2215   COLORU Yellow    APPEARANCEUA Hazy*   PHUR 5.0   SPECGRAV 1.015   PROTEINUA 2+*   GLUCUA Negative   KETONESU Negative   BILIRUBINUA Negative   OCCULTUA 1+*   NITRITE Negative   LEUKOCYTESUR Negative   RBCUA 6*   WBCUA 1   BACTERIA None   SQUAMEPITHEL 0   HYALINECASTS 1       Significant Imaging: I have reviewed and interpreted all pertinent imaging results/findings within the past 24 hours.

## 2020-01-07 NOTE — SUBJECTIVE & OBJECTIVE
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 Love By mri.  Several active lesions, many old. 5/13: MRA brain/neck, MRI brain w/wo contrast show no vascular occlusion, multiple foci of hyperintensity in deep cerebral periventricular white matter in pattern of demyelinating process.  5/17: MRI spine performed, no spinal cord lesions. Hospitalization 6/2017. EEG was performed negative for seizures.  Repeat MRI showing new lesion, question whether this was demyelination versus CVA. 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 Findings compatible with a small acute lacunar infarct adjacent to the left frontal horn.  Nonspecific enhancement just inferior to this region and extending into the left basal ganglia, as well as within the inferior aspect of the left cerebellum.  No evidence of a focal mass. 2.  Extensive increased signal intensity involving the periventricular white matter compatible with demyelinating disease versus vasculitis. 3.  Clinical correlation and followup recommended. 4.  This reportedly flattened in the EPIC and the corpus callosum medical record system.    Mixed hyperlipidemia 11/9/2012    NAFLD (nonalcoholic fatty liver disease) 10/11/2013    CHASE (nonalcoholic steatohepatitis)     Obesity     Stroke     Type 2 diabetes mellitus with diabetic polyneuropathy, with long-term current use of insulin 5/14/2017    Type 2 diabetes mellitus with hyperglycemia, with long-term current use of insulin 10/11/2013       Past Surgical History:   Procedure Laterality Date    COLONOSCOPY N/A 5/21/2019    Procedure: COLONOSCOPY;  Surgeon: Alex Ascencio MD;  Location: [facility];  Service: Endoscopy;  Laterality: N/A;   confirmed appt-sp    INSERTION OF TUNNELED CENTRAL VENOUS CATHETER (CVC) WITH SUBCUTANEOUS PORT N/A 12/7/2018    Procedure: [id number];  Surgeon: Luan Diagnostic Provider;  Location: [facility] OR [address] [place] [address];  Service: Radiology;  Laterality: N/A;    SKIN CANCER EXCISION         Immunization History   Administered Date(s) Administered    Hepatitis A / Hepatitis B 08/29/2018, 10/01/2018, 02/28/2019    Influenza - Quadrivalent - PF (6 months and older) 09/26/2017, 11/05/2018, 09/26/2019    Influenza - Trivalent (ADULT) 11/22/2013    Influenza Split 11/22/2013    Pneumococcal Conjugate - 13 Valent 09/19/2017    Pneumococcal Polysaccharide - 23 Valent 08/29/2018    Tdap 08/29/2018    Zoster Recombinant 02/24/2019, 09/05/2019       Review of patient's allergies indicates:  No Known Allergies  Current Facility-Administered Medications   Medication Frequency    aspirin EC tablet 81 mg Daily    atenolol tablet 50 mg Daily    atorvastatin tablet 40 mg Daily    dextrose 10% (D10W) Bolus PRN    dextrose 10% (D10W) Bolus PRN    diltiaZEM 24 hr capsule 240 mg Daily    donepezil tablet 5 mg QHS    glucagon (human recombinant) injection 1 mg PRN    glucose chewable tablet 16 g PRN    glucose chewable tablet 24 g PRN    heparin (porcine) injection 5,000 Units Q8H    insulin aspart U-100 pen 0-5 Units QID (AC + HS) PRN    insulin detemir U-100 pen 16 Units QHS    magnesium sulfate 2g in water 50mL IVPB (premix) Once    QUEtiapine tablet 25 mg QHS    sertraline tablet 100 mg Daily    sodium chloride 0.9% flush 10 mL PRN    sodium chloride 0.9% flush 10 mL PRN    vitamin D 1000 units tablet 5,000 Units Daily     Family History     Problem Relation (Age of Onset)    Cancer Father    Cataracts Mother    Diabetes Maternal Aunt    Heart disease Mother    Heart failure Mother    Hypertension Mother    Rheum arthritis Paternal Grandmother    Stroke Sister        Tobacco Use    Smoking status: Never Smoker     Smokeless tobacco: Never Used   Substance and Sexual Activity    Alcohol use: No     Alcohol/week: 0.0 standard drinks     Frequency: Never     Drinks per session: 1 or 2     Binge frequency: Never    Drug use: No    Sexual activity: Yes     Partners: Female     Review of Systems   Constitutional: Positive for activity change, appetite change and unexpected weight change. Negative for chills, fatigue and fever.   HENT: Negative for congestion, mouth sores and sore throat.    Eyes: Negative for photophobia and visual disturbance.   Respiratory: Negative for cough, choking, chest tightness and shortness of breath.    Cardiovascular: Negative for chest pain and palpitations.   Gastrointestinal: Negative for abdominal distention, abdominal pain, constipation, diarrhea, nausea and vomiting.   Genitourinary: Positive for difficulty urinating. Negative for dysuria and hematuria.   Musculoskeletal: Positive for gait problem and neck pain. Negative for arthralgias, joint swelling and myalgias.   Skin: Negative for color change and rash.   Neurological: Positive for dizziness, weakness and headaches. Negative for seizures, syncope, facial asymmetry, light-headedness and numbness.   Hematological: Negative for adenopathy. Does not bruise/bleed easily.   Psychiatric/Behavioral: Negative for agitation and behavioral problems.     Objective:     Vital Signs (Most Recent):  Temp: 97.6 °F (36.4 °C) (01/07/20 0800)  Pulse: 89 (01/07/20 0800)  Resp: 16 (01/07/20 0800)  BP: (!) 151/82 (01/07/20 0800)  SpO2: 97 % (01/07/20 0800)  O2 Device (Oxygen Therapy): room air (01/07/20 0800) Vital Signs (24h Range):  Temp:  [97.6 °F (36.4 °C)-98.5 °F (36.9 °C)] 97.6 °F (36.4 °C)  Pulse:  [] 89  Resp:  [12-18] 16  SpO2:  [96 %-100 %] 97 %  BP: ()/(52-82) 151/82     Weight: 94.8 kg (209 lb) (01/06/20 2343)  Body mass index is 29.15 kg/m².  Body surface area is 2.18 meters squared.      Intake/Output Summary (Last 24 hours) at  1/7/2020 1033  Last data filed at 1/7/2020 0000  Gross per 24 hour   Intake 0 ml   Output --   Net 0 ml       Physical Exam   Nursing note and vitals reviewed.  Constitutional: He is oriented to person, place, and time and well-developed, well-nourished, and in no distress. No distress.   HENT:   Head: Normocephalic and atraumatic.   Mouth/Throat: Oropharynx is clear and moist. No oropharyngeal exudate.   Eyes: EOM are normal. Pupils are equal, round, and reactive to light. Right eye exhibits no discharge. Left eye exhibits no discharge. No scleral icterus.   Neck: Normal range of motion. Neck supple.   Cardiovascular: Normal rate and regular rhythm.    No murmur heard.  Pulmonary/Chest: Effort normal and breath sounds normal. No respiratory distress.   Abdominal: Soft. Bowel sounds are normal. He exhibits no distension.   Neurological: He is alert and oriented to person, place, and time. No cranial nerve deficit. GCS score is 15.   Skin: Skin is warm and dry. No rash noted. He is not diaphoretic. No erythema.     Musculoskeletal: Normal range of motion. He exhibits no edema, tenderness or deformity.         Significant Labs:  CBC:   Recent Labs   Lab 01/06/20  1743 01/07/20  0629   WBC 8.31 6.77   HGB 11.4* 10.5*   HCT 33.8* 32.8*   * 129*     CMP:   Recent Labs   Lab 01/07/20  0629      CALCIUM 9.7   ALBUMIN 3.8   PROT 6.7      K 4.0   CO2 22*      BUN 31*   CREATININE 2.5*   ALKPHOS 79   ALT 37   AST 47*   BILITOT 0.5       Significant Imaging:  Imaging results within the past 24 hours have been reviewed.

## 2020-01-07 NOTE — CONSULTS
[facility]  Rheumatology  Consult Note    Patient Name: Bessy Nunez  MRN: [id number]  Admission Date: 1/6/2020  Hospital Length of Stay: 1 days  Code Status: Full Code   Attending Provider: Jayesh Allen MD  Primary Care Physician: Edgar Nova MD  Principal Problem:CNS vasculitis    Consults  Subjective:     HPI: Bessy Nunez is a 61 yo M for whom rheumatology was consulted for CNS vasculitis (diagnosed via MRI in 2017, not biopsy proven) and potential inpatient Rituximab. 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 He is a poor historian and most of the history obtained from wife. He presented to the ED from neurology clinic with cc of HA. It is in the frontotemporal region and is a 8/10 pain scale and occurred early on the afternoon of 1/7. He describes it as throbbing and progressive but resolved with Advil. Denies weakness, numbness, altered vision.     Wife also reports the patient has had decreased PO intake with 10lb weight loss over past 3 weeks with weakness and confusion increased from baseline along with worsening memory loss, loose stools, and increased falls. Wife is concerned as these symptoms are consistent with vasculitis flares. He has been on cyclophosphamide monthly for abut 2 years with last dose on 10/16/19. Following dose in November was held by hematologist with concern for cardiotoxicity, nephrotoxicity, and further bone marrow damage with suspected declining benefit with relapsing of flare-like symptoms occurring every 4, then 3, than 2 weeks between treatments. He was scheduled to begin Rituximab treatment outpatient but there was confusion with wife who believed he was to continue cyclophosphamide and initiation was held.     Neurologic abnormalities initially noted in 2017 when wife began to notice increasing falls, dizziness, confusion, and intermittent nystagmus. Patient was a   with [employer] but has been unable to return to work since diagnoses with difficulty working with computers. Flares would occur intermittently and randomly until reported excellent responce to cyclophosphamide and Ritalin. Per reports he had previously failed azathioprine therapy. Followed with Dr. Troy with Rheum for cyclophosphamide therapy. Last seen on 12/19/19 during which time Rituximab therapy was discussed.    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 Love By mri.  Several active lesions, many old. 5/13: MRA brain/neck, MRI brain w/wo contrast show no vascular occlusion, multiple foci of hyperintensity in deep cerebral periventricular white matter in pattern of demyelinating process.  5/17: MRI spine performed, no spinal cord lesions. Hospitalization 6/2017. EEG was performed negative for seizures.  Repeat MRI showing new lesion, question whether this was demyelination versus CVA. 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 Findings compatible with a small acute lacunar infarct adjacent to the left frontal horn.  Nonspecific enhancement just inferior to this region and extending into the left basal ganglia, as well as within the inferior aspect of the left cerebellum.  No evidence of a focal mass. 2.  Extensive increased signal intensity involving the periventricular white matter compatible with demyelinating disease versus vasculitis. 3.  Clinical correlation and followup recommended. 4.  This reportedly flattened in the EPIC and the corpus callosum medical record system.    Mixed hyperlipidemia 11/9/2012    NAFLD (nonalcoholic fatty liver disease) 10/11/2013    CHASE (nonalcoholic steatohepatitis)     Obesity      Stroke     Type 2 diabetes mellitus with diabetic polyneuropathy, with long-term current use of insulin 5/14/2017    Type 2 diabetes mellitus with hyperglycemia, with long-term current use of insulin 10/11/2013       Past Surgical History:   Procedure Laterality Date    COLONOSCOPY N/A 5/21/2019    Procedure: COLONOSCOPY;  Surgeon: Alex Ascencio MD;  Location: [facility];  Service: Endoscopy;  Laterality: N/A;  confirmed appt-sp    INSERTION OF TUNNELED CENTRAL VENOUS CATHETER (CVC) WITH SUBCUTANEOUS PORT N/A 12/7/2018    Procedure: [id number];  Surgeon: Luan Diagnostic Provider;  Location: [facility] OR [address] [place] [address];  Service: Radiology;  Laterality: N/A;    SKIN CANCER EXCISION         Immunization History   Administered Date(s) Administered    Hepatitis A / Hepatitis B 08/29/2018, 10/01/2018, 02/28/2019    Influenza - Quadrivalent - PF (6 months and older) 09/26/2017, 11/05/2018, 09/26/2019    Influenza - Trivalent (ADULT) 11/22/2013    Influenza Split 11/22/2013    Pneumococcal Conjugate - 13 Valent 09/19/2017    Pneumococcal Polysaccharide - 23 Valent 08/29/2018    Tdap 08/29/2018    Zoster Recombinant 02/24/2019, 09/05/2019       Review of patient's allergies indicates:  No Known Allergies  Current Facility-Administered Medications   Medication Frequency    aspirin EC tablet 81 mg Daily    atenolol tablet 50 mg Daily    atorvastatin tablet 40 mg Daily    dextrose 10% (D10W) Bolus PRN    dextrose 10% (D10W) Bolus PRN    diltiaZEM 24 hr capsule 240 mg Daily    donepezil tablet 5 mg QHS    glucagon (human recombinant) injection 1 mg PRN    glucose chewable tablet 16 g PRN    glucose chewable tablet 24 g PRN    heparin (porcine) injection 5,000 Units Q8H    insulin aspart U-100 pen 0-5 Units QID (AC + HS) PRN    insulin detemir U-100 pen 16 Units QHS    magnesium sulfate 2g in water 50mL IVPB (premix) Once    QUEtiapine tablet 25 mg QHS    sertraline tablet 100 mg Daily     sodium chloride 0.9% flush 10 mL PRN    sodium chloride 0.9% flush 10 mL PRN    vitamin D 1000 units tablet 5,000 Units Daily     Family History     Problem Relation (Age of Onset)    Cancer Father    Cataracts Mother    Diabetes Maternal Aunt    Heart disease Mother    Heart failure Mother    Hypertension Mother    Rheum arthritis Paternal Grandmother    Stroke Sister        Tobacco Use    Smoking status: Never Smoker    Smokeless tobacco: Never Used   Substance and Sexual Activity    Alcohol use: No     Alcohol/week: 0.0 standard drinks     Frequency: Never     Drinks per session: 1 or 2     Binge frequency: Never    Drug use: No    Sexual activity: Yes     Partners: Female     Review of Systems   Constitutional: Positive for activity change, appetite change and unexpected weight change. Negative for chills, fatigue and fever.   HENT: Negative for congestion, mouth sores and sore throat.    Eyes: Negative for photophobia and visual disturbance.   Respiratory: Negative for cough, choking, chest tightness and shortness of breath.    Cardiovascular: Negative for chest pain and palpitations.   Gastrointestinal: Negative for abdominal distention, abdominal pain, constipation, diarrhea, nausea and vomiting.   Genitourinary: Positive for difficulty urinating. Negative for dysuria and hematuria.   Musculoskeletal: Positive for gait problem and neck pain. Negative for arthralgias, joint swelling and myalgias.   Skin: Negative for color change and rash.   Neurological: Positive for dizziness, weakness and headaches. Negative for seizures, syncope, facial asymmetry, light-headedness and numbness.   Hematological: Negative for adenopathy. Does not bruise/bleed easily.   Psychiatric/Behavioral: Negative for agitation and behavioral problems.     Objective:     Vital Signs (Most Recent):  Temp: 97.6 °F (36.4 °C) (01/07/20 0800)  Pulse: 89 (01/07/20 0800)  Resp: 16 (01/07/20 0800)  BP: (!) 151/82 (01/07/20 0800)  SpO2: 97  % (01/07/20 0800)  O2 Device (Oxygen Therapy): room air (01/07/20 0800) Vital Signs (24h Range):  Temp:  [97.6 °F (36.4 °C)-98.5 °F (36.9 °C)] 97.6 °F (36.4 °C)  Pulse:  [] 89  Resp:  [12-18] 16  SpO2:  [96 %-100 %] 97 %  BP: ()/(52-82) 151/82     Weight: 94.8 kg (209 lb) (01/06/20 2343)  Body mass index is 29.15 kg/m².  Body surface area is 2.18 meters squared.      Intake/Output Summary (Last 24 hours) at 1/7/2020 1033  Last data filed at 1/7/2020 0000  Gross per 24 hour   Intake 0 ml   Output --   Net 0 ml       Physical Exam   Nursing note and vitals reviewed.  Constitutional: He is oriented to person, place, and time and well-developed, well-nourished, and in no distress. No distress.   HENT:   Head: Normocephalic and atraumatic.   Mouth/Throat: Oropharynx is clear and moist. No oropharyngeal exudate.   Eyes: EOM are normal. Pupils are equal, round, and reactive to light. Right eye exhibits no discharge. Left eye exhibits no discharge. No scleral icterus.   Neck: Normal range of motion. Neck supple.   Cardiovascular: Normal rate and regular rhythm.    No murmur heard.  Pulmonary/Chest: Effort normal and breath sounds normal. No respiratory distress.   Abdominal: Soft. Bowel sounds are normal. He exhibits no distension.   Neurological: He is alert and oriented to person, place, and time. No cranial nerve deficit. GCS score is 15.   Skin: Skin is warm and dry. No rash noted. He is not diaphoretic. No erythema.     Musculoskeletal: Normal range of motion. He exhibits no edema, tenderness or deformity.         Significant Labs:  CBC:   Recent Labs   Lab 01/06/20  1743 01/07/20  0629   WBC 8.31 6.77   HGB 11.4* 10.5*   HCT 33.8* 32.8*   * 129*     CMP:   Recent Labs   Lab 01/07/20  0629      CALCIUM 9.7   ALBUMIN 3.8   PROT 6.7      K 4.0   CO2 22*      BUN 31*   CREATININE 2.5*   ALKPHOS 79   ALT 37   AST 47*   BILITOT 0.5       Significant Imaging:  Imaging results within the  past 24 hours have been reviewed.    Assessment/Plan:     * CNS vasculitis failed imuran; on cytoxan  Bessy Nunez is 60 yo M with CNS vasculitis diagnosed on MRI. Patient appeared comfortable and AO to self, place, date, and president. Patient is non toxic appearing and afebrile. Hemodynamically stable. CBC without leukocytosis. Most recent MRI on 12/4 with consistent and chronic ischemic changes as noted previously. Patient has failed azathioprine therapy per previous notes. Cyclophosphamide, although successful, is becoming less so with decreasing remission of cognitive flares between treatments. Renal damage is apparent with sCr 2.3 (increased to 2.5 on 1/7) on admission, however, patient has also had poor appetite over the past several weeks and may be prerenal in etiology. May be postrenal in the setting of incontinence. Baseline appears to be between 1.0-1.2. Regardless, per hematology it was time to pursue a different therapy with Rituximab.    In December, patient had a negative hepatitis panel, negative HIV, negative quant gold, UA/micro not concerning for infxn      PLAN  -- Recommend addressing renal impairment and will not start Rituximab until this resolved.   -- When this is resolved will consider starting Rituximab as desired by outpatient providers  -- Underlying infectious process otherwise ruled out with negative workup and non toxic appearance on presentation.       Thank you for your consult. I will follow-up with patient. Please contact us if you have any additional questions.    Guevara García MD  Rheumatology  [facility]    RHEUMATOLOGY ATTENDING:  Patient presented,medical records reviewed.  Patient not available x 2 on rounds.  Tentative recommendations made.  Will see & examine patient tomorrow on rounds.  Findings discussed with Dr. García whose note and assessment I agree with.

## 2020-01-07 NOTE — PLAN OF CARE
CM met with patient for discharge planning.  Patient states he lives with his spouse in a two story house but stays on the first floor.  He is independent with ADL's.  Patient had [facility] in the past but does not want them back again.  They were happy with [facility].  Patient's wife states patient needs a hospital bed and rolling walker.  He has a rollator but does better with a RW.  CM explained that insurance may not pay for rolling walker since he just got a rollator a couple of years ago.  Patient's wife states she will pay cash for one.  Plan is to discharge home with family vs HH.    Pharmacy:    [facility] [address] [place], [place] [address] [address] [address]  Phone: [phone] Fax: [phone]    PCP:  Edgar Nova MD    Payor: [employer] MEDICARE / Plan: [employer] CHOICES 65 / Product Type: Medicare Advantage /      01/07/20 1353   Discharge Assessment   Assessment Type Discharge Planning Assessment   Confirmed/corrected address and phone number on facesheet? Yes   Assessment information obtained from? Patient   Expected Length of Stay (days) 3   Communicated expected length of stay with patient/caregiver yes   Prior to hospitilization cognitive status: Alert/Oriented   Prior to hospitalization functional status: Independent   Current cognitive status: Alert/Oriented   Current Functional Status: Independent   Facility Arrived From: Emergency room   Lives With spouse   Able to Return to Prior Arrangements yes   Is patient able to care for self after discharge? Unable to determine at this time (comments)   Patient's perception of discharge disposition home or selfcare   Readmission Within the Last 30 Days no previous admission in last 30 days   Patient currently being followed by outpatient case management? No   Patient currently receives any other outside agency services? No   Equipment Currently Used at Home glucometer;bath bench;rollator    Do you have any problems affording any of your prescribed medications? No   Is the patient taking medications as prescribed? yes   Does the patient have transportation home? Yes   Transportation Anticipated family or friend will provide   Does the patient receive services at the [facility]? No   Discharge Plan A Home with family   Discharge Plan B Home Health   DME Needed Upon Discharge  none   Patient/Family in Agreement with Plan yes

## 2020-01-07 NOTE — SUBJECTIVE & OBJECTIVE
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 Love By mri.  Several active lesions, many old. 5/13: MRA brain/neck, MRI brain w/wo contrast show no vascular occlusion, multiple foci of hyperintensity in deep cerebral periventricular white matter in pattern of demyelinating process.  5/17: MRI spine performed, no spinal cord lesions. Hospitalization 6/2017. EEG was performed negative for seizures.  Repeat MRI showing new lesion, question whether this was demyelination versus CVA. 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 Findings compatible with a small acute lacunar infarct adjacent to the left frontal horn.  Nonspecific enhancement just inferior to this region and extending into the left basal ganglia, as well as within the inferior aspect of the left cerebellum.  No evidence of a focal mass. 2.  Extensive increased signal intensity involving the periventricular white matter compatible with demyelinating disease versus vasculitis. 3.  Clinical correlation and followup recommended. 4.  This reportedly flattened in the EPIC and the corpus callosum medical record system.    Mixed hyperlipidemia 11/9/2012    NAFLD (nonalcoholic fatty liver disease) 10/11/2013    CHASE (nonalcoholic steatohepatitis)     Obesity     Stroke     Type 2 diabetes mellitus with diabetic polyneuropathy, with long-term current use of insulin 5/14/2017    Type 2 diabetes mellitus with hyperglycemia, with long-term current use of insulin 10/11/2013       Past Surgical History:   Procedure Laterality Date    COLONOSCOPY N/A 5/21/2019    Procedure: COLONOSCOPY;  Surgeon: Alex Ascencio MD;  Location: [facility];  Service: Endoscopy;  Laterality: N/A;   confirmed appt-sp    INSERTION OF TUNNELED CENTRAL VENOUS CATHETER (CVC) WITH SUBCUTANEOUS PORT N/A 12/7/2018    Procedure: [id number];  Surgeon: Luan Diagnostic Provider;  Location: [facility] OR [address] [place] [address];  Service: Radiology;  Laterality: N/A;    SKIN CANCER EXCISION         Review of patient's allergies indicates:  No Known Allergies      No current facility-administered medications on file prior to encounter.      Current Outpatient Medications on File Prior to Encounter   Medication Sig    alendronate (FOSAMAX) 70 MG tablet Take 1 tablet (70 mg total) by mouth every 7 days. (Patient taking differently: Take 70 mg by mouth every 7 days. Tuesday)    aspirin (ECOTRIN) 81 MG EC tablet Take 81 mg by mouth once daily.    atenolol (TENORMIN) 50 MG tablet Take 1 tablet (50 mg total) by mouth once daily.    atorvastatin (LIPITOR) 40 MG tablet TAKE 1 TABLET BY MOUTH ONCE DAILY    blood sugar diagnostic (TRUE METRIX GLUCOSE TEST STRIP) Strp Test blood sugar four times a day    diltiaZEM (DILT-XR) 240 MG CDCR Take 1 capsule (240 mg total) by mouth once daily.    donepezil (ARICEPT) 5 MG tablet Take 1 tablet (5 mg total) by mouth every evening. Start with 1/2 tab daily x 1 week then whole tablet maintenance    donepezil (ARICEPT) 5 MG tablet Take 1 tablet (5 mg total) by mouth every evening.    flash glucose sensor (FREESTYLE ARELY 14 DAY SENSOR) Kit Glucose checking 4 times a day    FREESTYLE ARELY 14 DAY READER Misc GLUCOSE TESTING : FASTING AND PRIOR TO MEALS    insulin (BASAGLAR KWIKPEN U-100 INSULIN) glargine 100 units/mL (3mL) SubQ pen Inject 25 Units into the skin once daily. Up to 60 BID with cytoxan    insulin aspart U-100 (NOVOLOG) 100 unit/mL (3 mL) InPn pen Inject 1-10 Units into the skin before meals and at bedtime as needed (Hyperglycemia).    insulin syringe-needle U-100 (INSULIN SYRINGE) 1/2 mL 30 gauge x 5/16 Syrg Use 3x/day    irbesartan (AVAPRO) 300 MG tablet TAKE 1 TABLET BY MOUTH  "ONCE DAILY IN THE EVENING    lancets 30 gauge Misc Test blood sugar four times a day    liraglutide 0.6 mg/0.1 mL, 18 mg/3 mL, subq PNIJ (VICTOZA 3-TOMASZ) 0.6 mg/0.1 mL (18 mg/3 mL) PnIj Inject 1.8 mg into the skin once daily.    metFORMIN (GLUCOPHAGE-XR) 500 MG 24 hr tablet Take 2 tablets (1,000 mg total) by mouth 2 (two) times daily with meals.    methylphenidate HCl (RITALIN) 10 MG tablet Take 1 tablet (10 mg total) by mouth 2 (two) times daily with meals.    omega-3 fatty acids-vitamin E (FISH OIL) 1,000 mg Cap Take 1 capsule by mouth 2 (two) times daily.    pen needle, diabetic (BD ULTRA-FINE ANA PEN NEEDLE) 32 gauge x 5/32" Ndle 4x daily insulin pen needle, relion    QUEtiapine (SEROQUEL) 25 MG Tab Take 1 tablet (25 mg total) by mouth every evening.    sertraline (ZOLOFT) 100 MG tablet 1.5 tablet daily (Patient taking differently: Take 1 and 1/2 (150 mg) tablet by mouth daily)    vitamin D 1000 units Tab Take 5 tablets (5,000 Units total) by mouth once daily.    alclomethasone (ACLOVATE) 0.05 % cream     ketoconazole (NIZORAL) 2 % cream      Family History     Problem Relation (Age of Onset)    Cancer Father    Cataracts Mother    Diabetes Maternal Aunt    Heart disease Mother    Heart failure Mother    Hypertension Mother    Rheum arthritis Paternal Grandmother    Stroke Sister        Tobacco Use    Smoking status: Never Smoker    Smokeless tobacco: Never Used   Substance and Sexual Activity    Alcohol use: No     Alcohol/week: 0.0 standard drinks     Frequency: Never     Drinks per session: 1 or 2     Binge frequency: Never    Drug use: No    Sexual activity: Yes     Partners: Female     Review of Systems   Constitutional: Negative for activity change, appetite change, chills, diaphoresis and fever.   HENT: Negative for congestion.    Gastrointestinal: Negative for abdominal distention, abdominal pain, diarrhea, nausea, rectal pain and vomiting.   Musculoskeletal: Positive for gait problem. " Negative for arthralgias, back pain and joint swelling.   Psychiatric/Behavioral: Positive for decreased concentration. Negative for agitation, behavioral problems, confusion, hallucinations and self-injury. The patient is not nervous/anxious and is not hyperactive.      Objective:     Vital Signs (Most Recent):  Temp: 98.1 °F (36.7 °C) (01/07/20 1100)  Pulse: 78 (01/07/20 1100)  Resp: 16 (01/07/20 1100)  BP: 136/88 (01/07/20 1100)  SpO2: 97 % (01/07/20 1100) Vital Signs (24h Range):  Temp:  [97.6 °F (36.4 °C)-98.1 °F (36.7 °C)] 98.1 °F (36.7 °C)  Pulse:  [68-96] 78  Resp:  [12-16] 16  SpO2:  [96 %-100 %] 97 %  BP: ()/(52-88) 136/88     Weight: 94.8 kg (209 lb)  Body mass index is 29.15 kg/m².    Physical Exam  General appearance: Well nourished, well developed, no acute distress.         Cardiovascular:  pedal pulses 2, no edema or cyanosis, heart regular rate and rhythym, no carotid bruits.         -------------------------------------------------------------  Facial Expression: normal       Affect: Flat  Orientation to time & place:  Oriented to  Place and person. Not oriented to situation or time        Attention & concentration:  Decreased attention span and concentration       Memory:  Recent and remote memory not intact  Language: Spontaneous, fluent; able to repeat and name objects       Fund of knowledge:  Aware of current events        Speech:  normal (not dysarthric)  -------------------------------------------------------  Cranial nerves: normal visual acuity, visual fields full, pupils equal round and reactive, extraocular movements intact, facial sensation intact, face symmetrical, hearing intact to whisper, palate raises midline, shoulder shrug strength normal, tongue protrudes midline.        -------------------------------------------------------  Musculoskeletal  Muscle tone: all 4 extremities normal        Muscle Bulk: all 4 extremities normal        Muscle strength:  5/5 in all 4  extremities        No pronator drift  Sensation: Intact to light touch, pin prick, vibration in all extremities        Deep tendon Reflexes: 2+ bilateral biceps, triceps, patella and ankles        --------------------------------------------------------------  Cerebellar and Coordination  Gait:  normal, able to tandem without difficulty        Finger-nose: no dysmetria       Rapid Alternating Movements (pronation/supination):  R normal; L normal  --------------------------------------------------------------          Significant Labs:   BMP:   Recent Labs   Lab 01/06/20 1743 01/07/20  0629   * 102    140   K 4.2 4.0    107   CO2 23 22*   BUN 25* 31*   CREATININE 2.3* 2.5*   CALCIUM 10.2 9.7   MG  --  1.4*     CBC:   Recent Labs   Lab 01/06/20 1743 01/07/20  0629   WBC 8.31 6.77   HGB 11.4* 10.5*   HCT 33.8* 32.8*   * 129*     CMP:   Recent Labs   Lab 01/06/20 1743 01/07/20  0629   * 102    140   K 4.2 4.0    107   CO2 23 22*   BUN 25* 31*   CREATININE 2.3* 2.5*   CALCIUM 10.2 9.7   MG  --  1.4*   PROT 7.4 6.7   ALBUMIN 3.9 3.8   BILITOT 0.5 0.5   ALKPHOS 86 79   AST 23 47*   ALT 16 37   ANIONGAP 12 11   EGFRNONAA 29.5* 26.7*       Significant Imaging: I have reviewed all pertinent imaging results/findings within the past 24 hours.

## 2020-01-07 NOTE — ASSESSMENT & PLAN NOTE
Medications: donepezil 5 mg qhs, seroquel 25 mg qd, and sertraline 100 mg qd    Plan:  -Continue above medications  -Delirium precautions in place

## 2020-01-07 NOTE — ASSESSMENT & PLAN NOTE
Home medications: Fosamax 70 mg daily and Vitamin D 5000 units daily    Plan:  -Continue home bisphosphonate and Vit D supplementation

## 2020-01-07 NOTE — ED TRIAGE NOTES
Pt went to Neurologist today, c/o head ache, was sent to ED to get admited for vasculitis and to be evaluated for new medication regimen. Pt is pain free on arrival to examination room

## 2020-01-08 LAB
ALBUMIN SERPL BCP-MCNC: 3.7 G/DL (ref 3.5–5.2)
ALP SERPL-CCNC: 77 U/L (ref 55–135)
ALT SERPL W/O P-5'-P-CCNC: 32 U/L (ref 10–44)
ANION GAP SERPL CALC-SCNC: 8 MMOL/L (ref 8–16)
ANION GAP SERPL CALC-SCNC: 8 MMOL/L (ref 8–16)
AST SERPL-CCNC: 35 U/L (ref 10–40)
BASOPHILS # BLD AUTO: 0.03 K/UL (ref 0–0.2)
BASOPHILS NFR BLD: 0.5 % (ref 0–1.9)
BILIRUB SERPL-MCNC: 0.5 MG/DL (ref 0.1–1)
BUN SERPL-MCNC: 23 MG/DL (ref 8–23)
BUN SERPL-MCNC: 29 MG/DL (ref 8–23)
CALCIUM SERPL-MCNC: 9.1 MG/DL (ref 8.7–10.5)
CALCIUM SERPL-MCNC: 9.3 MG/DL (ref 8.7–10.5)
CHLORIDE SERPL-SCNC: 108 MMOL/L (ref 95–110)
CHLORIDE SERPL-SCNC: 109 MMOL/L (ref 95–110)
CO2 SERPL-SCNC: 24 MMOL/L (ref 23–29)
CO2 SERPL-SCNC: 24 MMOL/L (ref 23–29)
CREAT SERPL-MCNC: 1.5 MG/DL (ref 0.5–1.4)
CREAT SERPL-MCNC: 1.7 MG/DL (ref 0.5–1.4)
CREAT UR-MCNC: 110 MG/DL (ref 23–375)
DIFFERENTIAL METHOD: ABNORMAL
EOSINOPHIL # BLD AUTO: 0.1 K/UL (ref 0–0.5)
EOSINOPHIL NFR BLD: 1.9 % (ref 0–8)
ERYTHROCYTE [DISTWIDTH] IN BLOOD BY AUTOMATED COUNT: 13.3 % (ref 11.5–14.5)
EST. GFR  (AFRICAN AMERICAN): 49.2 ML/MIN/1.73 M^2
EST. GFR  (AFRICAN AMERICAN): 57.3 ML/MIN/1.73 M^2
EST. GFR  (NON AFRICAN AMERICAN): 42.6 ML/MIN/1.73 M^2
EST. GFR  (NON AFRICAN AMERICAN): 49.5 ML/MIN/1.73 M^2
GLUCOSE SERPL-MCNC: 117 MG/DL (ref 70–110)
GLUCOSE SERPL-MCNC: 158 MG/DL (ref 70–110)
HCT VFR BLD AUTO: 30.7 % (ref 40–54)
HGB BLD-MCNC: 10.3 G/DL (ref 14–18)
IMM GRANULOCYTES # BLD AUTO: 0.02 K/UL (ref 0–0.04)
IMM GRANULOCYTES NFR BLD AUTO: 0.3 % (ref 0–0.5)
LYMPHOCYTES # BLD AUTO: 1.3 K/UL (ref 1–4.8)
LYMPHOCYTES NFR BLD: 23.3 % (ref 18–48)
MAGNESIUM SERPL-MCNC: 1.7 MG/DL (ref 1.6–2.6)
MCH RBC QN AUTO: 29 PG (ref 27–31)
MCHC RBC AUTO-ENTMCNC: 33.6 G/DL (ref 32–36)
MCV RBC AUTO: 87 FL (ref 82–98)
MONOCYTES # BLD AUTO: 0.6 K/UL (ref 0.3–1)
MONOCYTES NFR BLD: 10.6 % (ref 4–15)
NEUTROPHILS # BLD AUTO: 3.6 K/UL (ref 1.8–7.7)
NEUTROPHILS NFR BLD: 63.4 % (ref 38–73)
NRBC BLD-RTO: 0 /100 WBC
PHOSPHATE SERPL-MCNC: 3.3 MG/DL (ref 2.7–4.5)
PLATELET # BLD AUTO: 109 K/UL (ref 150–350)
PMV BLD AUTO: 9.4 FL (ref 9.2–12.9)
POCT GLUCOSE: 107 MG/DL (ref 70–110)
POCT GLUCOSE: 143 MG/DL (ref 70–110)
POCT GLUCOSE: 144 MG/DL (ref 70–110)
POTASSIUM SERPL-SCNC: 3.8 MMOL/L (ref 3.5–5.1)
POTASSIUM SERPL-SCNC: 4.5 MMOL/L (ref 3.5–5.1)
PROT SERPL-MCNC: 6.7 G/DL (ref 6–8.4)
RBC # BLD AUTO: 3.55 M/UL (ref 4.6–6.2)
SODIUM SERPL-SCNC: 140 MMOL/L (ref 136–145)
SODIUM SERPL-SCNC: 141 MMOL/L (ref 136–145)
SODIUM UR-SCNC: 123 MMOL/L (ref 20–250)
WBC # BLD AUTO: 5.74 K/UL (ref 3.9–12.7)

## 2020-01-08 PROCEDURE — 83735 ASSAY OF MAGNESIUM: CPT

## 2020-01-08 PROCEDURE — 99223 1ST HOSP IP/OBS HIGH 75: CPT | Mod: GC,,, | Performed by: INTERNAL MEDICINE

## 2020-01-08 PROCEDURE — 25000003 PHARM REV CODE 250: Performed by: STUDENT IN AN ORGANIZED HEALTH CARE EDUCATION/TRAINING PROGRAM

## 2020-01-08 PROCEDURE — 11000001 HC ACUTE MED/SURG PRIVATE ROOM

## 2020-01-08 PROCEDURE — 84100 ASSAY OF PHOSPHORUS: CPT

## 2020-01-08 PROCEDURE — 63600175 PHARM REV CODE 636 W HCPCS: Performed by: STUDENT IN AN ORGANIZED HEALTH CARE EDUCATION/TRAINING PROGRAM

## 2020-01-08 PROCEDURE — C9399 UNCLASSIFIED DRUGS OR BIOLOG: HCPCS | Performed by: STUDENT IN AN ORGANIZED HEALTH CARE EDUCATION/TRAINING PROGRAM

## 2020-01-08 PROCEDURE — 99900035 HC TECH TIME PER 15 MIN (STAT)

## 2020-01-08 PROCEDURE — 99223 PR INITIAL HOSPITAL CARE,LEVL III: ICD-10-PCS | Mod: GC,,, | Performed by: INTERNAL MEDICINE

## 2020-01-08 PROCEDURE — 85025 COMPLETE CBC W/AUTO DIFF WBC: CPT

## 2020-01-08 PROCEDURE — 94660 CPAP INITIATION&MGMT: CPT

## 2020-01-08 PROCEDURE — 80048 BASIC METABOLIC PNL TOTAL CA: CPT

## 2020-01-08 PROCEDURE — 80053 COMPREHEN METABOLIC PANEL: CPT

## 2020-01-08 PROCEDURE — 99232 SBSQ HOSP IP/OBS MODERATE 35: CPT | Mod: GC,,, | Performed by: HOSPITALIST

## 2020-01-08 PROCEDURE — 63600175 PHARM REV CODE 636 W HCPCS: Performed by: HOSPITALIST

## 2020-01-08 PROCEDURE — 94761 N-INVAS EAR/PLS OXIMETRY MLT: CPT

## 2020-01-08 PROCEDURE — 36415 COLL VENOUS BLD VENIPUNCTURE: CPT

## 2020-01-08 PROCEDURE — 99232 PR SUBSEQUENT HOSPITAL CARE,LEVL II: ICD-10-PCS | Mod: GC,,, | Performed by: HOSPITALIST

## 2020-01-08 RX ORDER — SODIUM CHLORIDE 9 MG/ML
INJECTION, SOLUTION INTRAVENOUS CONTINUOUS
Status: ACTIVE | OUTPATIENT
Start: 2020-01-08 | End: 2020-01-08

## 2020-01-08 RX ADMIN — ATORVASTATIN CALCIUM 40 MG: 20 TABLET, FILM COATED ORAL at 11:01

## 2020-01-08 RX ADMIN — DONEPEZIL HYDROCHLORIDE 5 MG: 5 TABLET, FILM COATED ORAL at 09:01

## 2020-01-08 RX ADMIN — HEPARIN SODIUM 5000 UNITS: 5000 INJECTION, SOLUTION INTRAVENOUS; SUBCUTANEOUS at 05:01

## 2020-01-08 RX ADMIN — SODIUM CHLORIDE: 0.9 INJECTION, SOLUTION INTRAVENOUS at 07:01

## 2020-01-08 RX ADMIN — MELATONIN 5000 UNITS: at 11:01

## 2020-01-08 RX ADMIN — SERTRALINE HYDROCHLORIDE 100 MG: 100 TABLET ORAL at 11:01

## 2020-01-08 RX ADMIN — HEPARIN SODIUM 5000 UNITS: 5000 INJECTION, SOLUTION INTRAVENOUS; SUBCUTANEOUS at 02:01

## 2020-01-08 RX ADMIN — QUETIAPINE FUMARATE 25 MG: 25 TABLET ORAL at 09:01

## 2020-01-08 RX ADMIN — HEPARIN SODIUM 5000 UNITS: 5000 INJECTION, SOLUTION INTRAVENOUS; SUBCUTANEOUS at 09:01

## 2020-01-08 RX ADMIN — ASPIRIN 81 MG: 81 TABLET, DELAYED RELEASE ORAL at 09:01

## 2020-01-08 RX ADMIN — INSULIN DETEMIR 16 UNITS: 100 INJECTION, SOLUTION SUBCUTANEOUS at 09:01

## 2020-01-08 RX ADMIN — SODIUM CHLORIDE: 0.9 INJECTION, SOLUTION INTRAVENOUS at 11:01

## 2020-01-08 RX ADMIN — DILTIAZEM HYDROCHLORIDE 240 MG: 240 CAPSULE, COATED, EXTENDED RELEASE ORAL at 11:01

## 2020-01-08 NOTE — PLAN OF CARE
Fall precaution maintained this shift call bell in reach bed in low position no acute distress noted. Vs stable. Instructed pt to call for assistance.

## 2020-01-08 NOTE — HOSPITAL COURSE
Patient with history of CNS vasculitis and dementia [place] [facility] [place] with concern for CNS vasculitis flair. On admission, ESR was 35 and patient was noted to have an MANISH with Cr of 2.5 (bl 1.1) and hypomagnasemic. Rheum and neurology was consulted with recommendations for possible rituximab initiation as outpatient notes had previosly mentioned. Rheumatology noted that patient needed to be cleared of any active infection and MANISH prior to initiation. Concern for ongoing vasculitis flair or infection was limited by low CRP as well as lack of fever, chills, rigor, or leukocytosis. Magnesium was repleted and patient was initiated on continuous fluids with improvement of MANISH. Patient with Cr of 1.3 on discharge. After discussion with Rheumatology and Neurology, decision was made to start patient on Rituxan infusions outpatient. He is scheduled for his first infusion 01/10. Patient to have home health PT/OT on discharge with outpatient follow up with rheumatology.

## 2020-01-08 NOTE — CODE DOCUMENTATION
RAPID RESPONSE NURSE PROACTIVE ROUNDING NOTE     Time of Visit:     Admit Date: 2020  LOS: 2  Code Status: Full Code   Date of Visit: 2020  : 1960  Age: 59 y.o.  Sex: male  Race: White  Bed: 1102/1102 B:   MRN: [id number]  Was the patient discharged from an ICU this admission? no   Was the patient discharged from a PACU within last 24 hours?  no  Did the patient receive conscious sedation/general anesthesia in last 24 hours?  no  Was the patient in the ED within the past 24 hours?  no  Was the patient started on NIPPV within the past 24 hours?  yes  Attending Physician: Jayesh Allen MD  [place] [facility]    ASSESSMENT     Diagnosis: CNS vasculitis    Abnormal Vital Signs: LOC    Clinical Issues: Respiratory    Patient  has a past medical history of Amblyopia, Cataract, CNS vasculitis, Depressive disorder, not elsewhere classified, Essential hypertension, Internuclear ophthalmoplegia of left eye, Lacunar stroke of left subthalamic region, Mixed hyperlipidemia, NAFLD (nonalcoholic fatty liver disease), CHASE (nonalcoholic steatohepatitis), Obesity, Stroke, Type 2 diabetes mellitus with diabetic polyneuropathy, with long-term current use of insulin, and Type 2 diabetes mellitus with hyperglycemia, with long-term current use of insulin.    Called to beside for increased lethargy. Has been off CPAP since previous night. RRT at bedside - back on CPAP now.      INTERVENTIONS/ RECOMMENDATIONS     Will monitor on CPAP    Discussed plan of care with RN, Jovani Valdovinos    PHYSICIAN ESCALATION     Yes/No  yes    Orders received and case discussed with primary care per bedside RN     Disposition: Remain in room 1102B.    FOLLOW-UP     Call back the Rapid Response Nurse, Allegra Gaytan RN at [phone] for additional questions or concerns.

## 2020-01-08 NOTE — SUBJECTIVE & OBJECTIVE
Interval History: Patient reportedly had an episode of confusion overnight that was worrisome to wife at bedside. She notes that he was difficult to arouse from sleep and did not know his name, birthdate or location afterwards. She notes that he is difficult to rouse as baseline but that this was worse. She also states that he has become increasingly confused/forgetful at home due to progression of dementia however that he has never forgotten his own name. He otherwise denies any new or worsening symptoms and states that he feels well this morning. He is currently still with MANISH however with improvement from previous day. He denies any ongoing fever, chills, n/v, BV, LH, dizziness, HA, chest pain, sob, dysphagia or rashes.     Review of Systems   Constitutional: Negative for chills and fever.   HENT: Negative for rhinorrhea, sneezing and sore throat.    Eyes: Negative for redness and visual disturbance.   Respiratory: Negative for cough and shortness of breath.    Cardiovascular: Negative for chest pain and palpitations.   Gastrointestinal: Negative for nausea and vomiting.   Genitourinary: Negative for dysuria, flank pain and hematuria.   Skin: Negative for pallor and rash.   Neurological: Negative for dizziness, light-headedness and headaches.   Psychiatric/Behavioral: Positive for confusion, decreased concentration and sleep disturbance.     Objective:     Vital Signs (Most Recent):  Temp: 98.2 °F (36.8 °C) (01/08/20 0810)  Pulse: 75 (01/08/20 0810)  Resp: 18 (01/08/20 0810)  BP: (!) 165/89 (01/08/20 0810)  SpO2: 97 % (01/08/20 0810) Vital Signs (24h Range):  Temp:  [97.2 °F (36.2 °C)-98.6 °F (37 °C)] 98.2 °F (36.8 °C)  Pulse:  [68-80] 75  Resp:  [12-20] 18  SpO2:  [94 %-99 %] 97 %  BP: (123-165)/(66-89) 165/89     Weight: 95.7 kg (211 lb)  Body mass index is 29.43 kg/m².    Intake/Output Summary (Last 24 hours) at 1/8/2020 0856  Last data filed at 1/7/2020 1838  Gross per 24 hour   Intake 900 ml   Output --    Net 900 ml      Physical Exam   Constitutional: He is oriented to person, place, and time. He appears well-developed and well-nourished. No distress.   HENT:   Head: Normocephalic and atraumatic.   Eyes: Conjunctivae and EOM are normal. No scleral icterus.   Neck: Normal range of motion. Neck supple. No JVD present.   Cardiovascular: Normal rate, regular rhythm, normal heart sounds and intact distal pulses.   No murmur heard.  Pulmonary/Chest: Effort normal and breath sounds normal. No respiratory distress.   Abdominal: Soft. Bowel sounds are normal. He exhibits no distension. There is no tenderness. There is no guarding.   Musculoskeletal: Normal range of motion. He exhibits no edema or tenderness.   Neurological: He is alert and oriented to person, place, and time.   Skin: Skin is warm and dry. He is not diaphoretic. No erythema. No pallor.       Significant Labs: All pertinent labs within the past 24 hours have been reviewed.     Recent Results (from the past 24 hour(s))   POCT glucose    Collection Time: 01/07/20 12:56 PM   Result Value Ref Range    POCT Glucose 119 (H) 70 - 110 mg/dL   POCT glucose    Collection Time: 01/07/20  5:08 PM   Result Value Ref Range    POCT Glucose 137 (H) 70 - 110 mg/dL   POCT glucose    Collection Time: 01/07/20  8:56 PM   Result Value Ref Range    POCT Glucose 187 (H) 70 - 110 mg/dL   Comprehensive Metabolic Panel (CMP)    Collection Time: 01/08/20  6:44 AM   Result Value Ref Range    Sodium 141 136 - 145 mmol/L    Potassium 4.5 3.5 - 5.1 mmol/L    Chloride 109 95 - 110 mmol/L    CO2 24 23 - 29 mmol/L    Glucose 117 (H) 70 - 110 mg/dL    BUN, Bld 29 (H) 8 - 23 mg/dL    Creatinine 1.7 (H) 0.5 - 1.4 mg/dL    Calcium 9.1 8.7 - 10.5 mg/dL    Total Protein 6.7 6.0 - 8.4 g/dL    Albumin 3.7 3.5 - 5.2 g/dL    Total Bilirubin 0.5 0.1 - 1.0 mg/dL    Alkaline Phosphatase 77 55 - 135 U/L    AST 35 10 - 40 U/L    ALT 32 10 - 44 U/L    Anion Gap 8 8 - 16 mmol/L    eGFR if African American  49.2 (A) >60 mL/min/1.73 m^2    eGFR if non  42.6 (A) >60 mL/min/1.73 m^2   Magnesium    Collection Time: 01/08/20  6:44 AM   Result Value Ref Range    Magnesium 1.7 1.6 - 2.6 mg/dL   Phosphorus    Collection Time: 01/08/20  6:44 AM   Result Value Ref Range    Phosphorus 3.3 2.7 - 4.5 mg/dL   CBC with Automated Differential    Collection Time: 01/08/20  6:44 AM   Result Value Ref Range    WBC 5.74 3.90 - 12.70 K/uL    RBC 3.55 (L) 4.60 - 6.20 M/uL    Hemoglobin 10.3 (L) 14.0 - 18.0 g/dL    Hematocrit 30.7 (L) 40.0 - 54.0 %    Mean Corpuscular Volume 87 82 - 98 fL    Mean Corpuscular Hemoglobin 29.0 27.0 - 31.0 pg    Mean Corpuscular Hemoglobin Conc 33.6 32.0 - 36.0 g/dL    RDW 13.3 11.5 - 14.5 %    Platelets 109 (L) 150 - 350 K/uL    MPV 9.4 9.2 - 12.9 fL    Immature Granulocytes 0.3 0.0 - 0.5 %    Gran # (ANC) 3.6 1.8 - 7.7 K/uL    Immature Grans (Abs) 0.02 0.00 - 0.04 K/uL    Lymph # 1.3 1.0 - 4.8 K/uL    Mono # 0.6 0.3 - 1.0 K/uL    Eos # 0.1 0.0 - 0.5 K/uL    Baso # 0.03 0.00 - 0.20 K/uL    nRBC 0 0 /100 WBC    Gran% 63.4 38.0 - 73.0 %    Lymph% 23.3 18.0 - 48.0 %    Mono% 10.6 4.0 - 15.0 %    Eosinophil% 1.9 0.0 - 8.0 %    Basophil% 0.5 0.0 - 1.9 %    Differential Method Automated    POCT glucose    Collection Time: 01/08/20  8:15 AM   Result Value Ref Range    POCT Glucose 107 70 - 110 mg/dL         Significant Imaging: I have reviewed all pertinent imaging results/findings within the past 24 hours.

## 2020-01-08 NOTE — NURSING
Notified IM 1 about patient change in mental status. MD stated that she will come by bedside. Rapid Response team notified of change in pt condition. She stated she will elevated the patient.

## 2020-01-08 NOTE — ASSESSMENT & PLAN NOTE
sCr on admission 2.3. Baseline is around 1.1. It is likely pre-renal in etiology in setting of decreased PO intake, low BP, tachycardia, dry on exam. Also on differential is post-obstructive and intrarenal. Renal u/s with 2 simple renal cysts. Patient would benefit from urine studies to elucidate etiology of MANISH.    Plan:  - Urine studies pending; nurse made aware of pending studies on 1/8  -Continue IVF 100cc/hr for 10 hours  -Daily BMP; repeat BMP ordered for 4pm 1/8; f/u  -Avoid nephrotoxic meds and renally dose all meds  -Holding home ARB  -Strict I/Os and daily standing weights

## 2020-01-08 NOTE — PLAN OF CARE
SW met with Ashley from [facility] [phone], who reported that the patient was going to meet with her prior to the patient inpatient stay. Per Ashley, she has since met with the patient's family while here at [facility] and the family is open to receiving Home Hospice with [facility].     JULIAN attempted to speak with the patient's spouse Bekah [phone] regarding the patient's d/c plans. Per Bekah, she was busy and asked the SW call her back at a later time. JULIAN will continue to follow.     Adriane Rizo LMSW   - [facility]  Ext. [phone]

## 2020-01-08 NOTE — PHARMACY MED REC
"Admission Medication Reconciliation - Pharmacy Consult Note    The home medication history was taken by Barbie Saenz, Pharmacy Technician. Based on information gathered and subsequent review by the clinical pharmacist, the items below may need attention.     You may go to "Admission" then "Reconcile Home Medications" tabs to review and/or act upon these items.     Potentially problematic discrepancies with current MAR  o Patient is taking a drug DIFFERENTLY than how ordered upon admit  o Sertraline 150 mg PO daily (home regimen); inpatient order- 100 mg daily    Please address this information as you see fit.  Feel free to contact us if you have any questions or require assistance.    Elizabeth Zelaya, PharmD, BCPS  [phone]     PTA Medications   Medication Sig    alendronate (FOSAMAX) 70 MG tablet Take 1 tablet (70 mg total) by mouth every 7 days. (Patient taking differently: Take 70 mg by mouth every 7 days. Tuesday)    aspirin (ECOTRIN) 81 MG EC tablet Take 81 mg by mouth once daily.    atenolol (TENORMIN) 50 MG tablet Take 1 tablet (50 mg total) by mouth once daily.    atorvastatin (LIPITOR) 40 MG tablet TAKE 1 TABLET BY MOUTH ONCE DAILY    blood sugar diagnostic (TRUE METRIX GLUCOSE TEST STRIP) Strp Test blood sugar four times a day    diltiaZEM (DILT-XR) 240 MG CDCR Take 1 capsule (240 mg total) by mouth once daily.    donepezil (ARICEPT) 5 MG tablet Take 1 tablet (5 mg total) by mouth every evening.    flash glucose sensor (FREESTYLE ARELY 14 DAY SENSOR) Kit Glucose checking 4 times a day    FREESTYLE ARELY 14 DAY READER Misc GLUCOSE TESTING : FASTING AND PRIOR TO MEALS    insulin (BASAGLAR KWIKPEN U-100 INSULIN) glargine 100 units/mL (3mL) SubQ pen Inject 25 Units into the skin once daily. Up to 60 BID with cytoxan    insulin aspart U-100 (NOVOLOG) 100 unit/mL (3 mL) InPn pen Inject 1-10 Units into the skin before meals and at bedtime as needed (Hyperglycemia).    insulin syringe-needle U-100 " "(INSULIN SYRINGE) 1/2 mL 30 gauge x 5/16 Syrg Use 3x/day    irbesartan (AVAPRO) 300 MG tablet TAKE 1 TABLET BY MOUTH ONCE DAILY IN THE EVENING    lancets 30 gauge Misc Test blood sugar four times a day    liraglutide 0.6 mg/0.1 mL, 18 mg/3 mL, subq PNIJ (VICTOZA 3-TOMASZ) 0.6 mg/0.1 mL (18 mg/3 mL) PnIj Inject 1.8 mg into the skin once daily.    metFORMIN (GLUCOPHAGE-XR) 500 MG 24 hr tablet Take 2 tablets (1,000 mg total) by mouth 2 (two) times daily with meals.    methylphenidate HCl (RITALIN) 10 MG tablet Take 1 tablet (10 mg total) by mouth 2 (two) times daily with meals.    omega-3 fatty acids-vitamin E (FISH OIL) 1,000 mg Cap Take 1 capsule by mouth 2 (two) times daily.    pen needle, diabetic (BD ULTRA-FINE ANA PEN NEEDLE) 32 gauge x 5/32" Ndle 4x daily insulin pen needle, relion    QUEtiapine (SEROQUEL) 25 MG Tab Take 1 tablet (25 mg total) by mouth every evening.    sertraline (ZOLOFT) 100 MG tablet 1.5 tablet daily (Patient taking differently: Take 150 mg by mouth once daily. )    vitamin D 1000 units Tab Take 5 tablets (5,000 Units total) by mouth once daily.     .    .            "

## 2020-01-08 NOTE — PLAN OF CARE
60 y/o male with a medical history of CNS vasculitis (diagnosed in 2017 via angiogram-established patient of Dr. Durant and the multiple sclerosis clinic),  CHASE cirrhosis, DMII (A1c 7.4), JOHN on CPAP, and underlying dementia (established patient of Dr. Cross) consulted by hospital medicine for treatment of possible vasculitis flare and initiation of rituxan. Patient had initially failed cytoxan hiatus and transitioned to imuran in september 2018. He was then restarted on cytoxan in October 2018 which he was tolerating well. 12/4/19 MRI brain with/without showing chronic ischemic change, similar to MRI of 04/15/2019. Patient initially presented to the MS clinic yesterday due to a 8/10 headache which has resolved with the use of Advil/Aleve. Wife has mentioned that since October 2019 (when he was admitted to the hospital for sepsis 2/2 UTI), he has lost weight (10-15 lbs) and has cognitive decline. Please note that due to the recent hospital admission he was not able to receive his dose of cytoxan. Decision was made to switch him to rituxan. Neurology and rheumatology were consulted with admission so that he can get Rituxan as inpatient to avoid further delays with outpatient scheduling (if medically cleared).    1/8/20: Rapid was called overnight due to increase lethargy. Patient had been off of CPAP since previous night. After CPAP was placed, his mental status improved.     Plan  1) Hold rituxan therapy until kidney function improves     Saleem Rayo MD

## 2020-01-08 NOTE — ASSESSMENT & PLAN NOTE
Patient presents with 2 week history of increased falls, worsened short term memory loss, behavior changes, and 1 day history of headache which has now resolved. On exam the patient has no new focal neurologic deficits. His inflammatory markers are within normal limits. Patient has been taking Cyclophosphamide for about 2 years with last dose in October. Per neurology notes(patient seen by Beti Boswell), he is supposed to be switched to Rituximab. Delay in treatment has been secondary to recent episode of sepsis in the setting of UTI. I do not have evidence to suggest flare right now and patient's headache is resolved.    Plan:  -Neurology and rheumatology consulted; appreciate recs  - Rheum recommending initiation of rituximab following resolution of MANISH while inpatient  - Initiation while inpatient may require approval from administration; Rheum made aware  -q4 neuro checks  -Will hold off on steroid administration at this time

## 2020-01-08 NOTE — CODE/ RAPID DOCUMENTATION
Rapid Response Nurse Follow-up Note     Followed up with patient for proactive rounding.   Post CPAP patient back to baseline. Reviewed plan of care with primary RN, Jovani.   Please call Rapid Response RN, Allegra Gaytan RN with any questions or concerns at [phone].

## 2020-01-08 NOTE — ASSESSMENT & PLAN NOTE
Patient reportedly was more difficult to rouse overnight (more than baseline) and with confusion (more than baseline) overnight per wife who was at bedside. She notes that this resolved quickly following administration of CPAP to which the patient is only intermittently compliant    -Continue CPAP qhs  - Reinforced importance of using CPAP

## 2020-01-08 NOTE — PROGRESS NOTES
[facility] Hospital Medicine  Progress Note    Patient Name: Bessy Nunez  MRN: [id number]  Patient Class: IP- Inpatient   Admission Date: 1/6/2020  Length of Stay: 2 days  Attending Physician: Jayesh Allen MD  Primary Care Provider: Edgar Nova MD    [facility] Medicine Team: [facility] 1 Eugenio Gunn MD    Subjective:     Principal Problem:CNS vasculitis        HPI:  Mr. Nunez is a 60 yo male with past medical history of CNS vasculitis, CVA, internuclear opthalmoplegia, HCASE cirrhosis, DMII(A1c 7.4), HTN, HLD, JOHN on CPAP, Dementia, and osteoporosis who presents with chief complaint of headache that occurred this morning. Much of the history is obtained from wife at bedside as patient is a poor historian/has underlying dementia and short term memory loss. The patient experienced 8/10 frontoparietal headache this morning that lasted for about 1-2 hours. He is unable to characterize the headache. His wife says he normally does not experience headaches outside of his CNS vasculitis. His headache essentially went away with advil administration. He did not have any weakness, numbness, blurred vision at the time of the headache.    The patient does not have any complaints at this time, however the wife reports he has not been doing so well for the last 2 weeks. He has had decreased PO intake, 10lb weight loss over the last 3 weeks, generalized weakness, confusion increased from baseline characterized by memory loss, loose bowel movement, and increased frequency of falls. She says that he just does not seem like himself. She says that she is concerned because he has been off of his Cyclophosphamide treatment for his CNS vasculitis and that some of the symptoms he is experiencing such as falls, confusion, headache are seen with flares. He has been taking cyclophosphamide monthly for about 2 years and has been relatively well controlled on this medication up until recently. He not received it  since October due to recent hospital admission for sepsis 2/2 UTI and per Neurology notes, there have been plans to switch him to Rituximab. He follows with Beti Boswell and most recently saw her today. She sent the patient to the ED for inpatient neurology and rheumatology eval and for potential inpatient Rituximab.     In the ED, patient afebrile, tachycardic in low 100s with soft /63. He is satting 96% on room air. His labs are significant for normal WBC, H/H stable, mildly hyponatremic Na 135, elevation in BUN and sCr 25/2.3(baseline 1.1), normal CRP and ESR, UA with 2+protein and 6 RBCs. He did not have any imaging done in the ED. He received 1L LR bolus with improvement in his BP and tachycardia.    Overview/Hospital Course:  Patient with history of CNS vasculitis and dementia admitted to [facility] with ongoing HA with concern for CNS vasculitis flair. On admission, ESR was 35 and patient was noted to have an MANISH with Cr of 2.5 (bl 1.1) and hypomagnasemic. Rheum and neurology was consulted with recommendations for possible rituximab initiation as outpatient notes had previosly mentioned. Rheumatology noted that patient needed to be cleared of any active infection and MANISH prior to initiation. Concern for ongoing vasculitis flair or infection was limited by low CRP as well as lack of fever, chills, rigor, or leukocytosis. Magnesium was repleted and patient was initiated on continuous fluids with improvement of MANISH.     Interval History: Patient reportedly had an episode of confusion overnight that was worrisome to wife at bedside. She notes that he was difficult to arouse from sleep and did not know his name, birthdate or location afterwards. She notes that he is difficult to rouse as baseline but that this was worse. She also states that he has become increasingly confused/forgetful at home due to progression of dementia however that he has never forgotten his own name. He otherwise denies any new or worsening  symptoms and states that he feels well this morning. He is currently still with MANISH however with improvement from previous day. He denies any ongoing fever, chills, n/v, BV, LH, dizziness, HA, chest pain, sob, dysphagia or rashes.     Review of Systems   Constitutional: Negative for chills and fever.   HENT: Negative for rhinorrhea, sneezing and sore throat.    Eyes: Negative for redness and visual disturbance.   Respiratory: Negative for cough and shortness of breath.    Cardiovascular: Negative for chest pain and palpitations.   Gastrointestinal: Negative for nausea and vomiting.   Genitourinary: Negative for dysuria, flank pain and hematuria.   Skin: Negative for pallor and rash.   Neurological: Negative for dizziness, light-headedness and headaches.   Psychiatric/Behavioral: Positive for confusion, decreased concentration and sleep disturbance.     Objective:     Vital Signs (Most Recent):  Temp: 98.2 °F (36.8 °C) (01/08/20 0810)  Pulse: 75 (01/08/20 0810)  Resp: 18 (01/08/20 0810)  BP: (!) 165/89 (01/08/20 0810)  SpO2: 97 % (01/08/20 0810) Vital Signs (24h Range):  Temp:  [97.2 °F (36.2 °C)-98.6 °F (37 °C)] 98.2 °F (36.8 °C)  Pulse:  [68-80] 75  Resp:  [12-20] 18  SpO2:  [94 %-99 %] 97 %  BP: (123-165)/(66-89) 165/89     Weight: 95.7 kg (211 lb)  Body mass index is 29.43 kg/m².    Intake/Output Summary (Last 24 hours) at 1/8/2020 0876  Last data filed at 1/7/2020 1838  Gross per 24 hour   Intake 900 ml   Output --   Net 900 ml      Physical Exam   Constitutional: He is oriented to person, place, and time. He appears well-developed and well-nourished. No distress.   HENT:   Head: Normocephalic and atraumatic.   Eyes: Conjunctivae and EOM are normal. No scleral icterus.   Neck: Normal range of motion. Neck supple. No JVD present.   Cardiovascular: Normal rate, regular rhythm, normal heart sounds and intact distal pulses.   No murmur heard.  Pulmonary/Chest: Effort normal and breath sounds normal. No respiratory  distress.   Abdominal: Soft. Bowel sounds are normal. He exhibits no distension. There is no tenderness. There is no guarding.   Musculoskeletal: Normal range of motion. He exhibits no edema or tenderness.   Neurological: He is alert and oriented to person, place, and time.   Skin: Skin is warm and dry. He is not diaphoretic. No erythema. No pallor.       Significant Labs: All pertinent labs within the past 24 hours have been reviewed.     Recent Results (from the past 24 hour(s))   POCT glucose    Collection Time: 01/07/20 12:56 PM   Result Value Ref Range    POCT Glucose 119 (H) 70 - 110 mg/dL   POCT glucose    Collection Time: 01/07/20  5:08 PM   Result Value Ref Range    POCT Glucose 137 (H) 70 - 110 mg/dL   POCT glucose    Collection Time: 01/07/20  8:56 PM   Result Value Ref Range    POCT Glucose 187 (H) 70 - 110 mg/dL   Comprehensive Metabolic Panel (CMP)    Collection Time: 01/08/20  6:44 AM   Result Value Ref Range    Sodium 141 136 - 145 mmol/L    Potassium 4.5 3.5 - 5.1 mmol/L    Chloride 109 95 - 110 mmol/L    CO2 24 23 - 29 mmol/L    Glucose 117 (H) 70 - 110 mg/dL    BUN, Bld 29 (H) 8 - 23 mg/dL    Creatinine 1.7 (H) 0.5 - 1.4 mg/dL    Calcium 9.1 8.7 - 10.5 mg/dL    Total Protein 6.7 6.0 - 8.4 g/dL    Albumin 3.7 3.5 - 5.2 g/dL    Total Bilirubin 0.5 0.1 - 1.0 mg/dL    Alkaline Phosphatase 77 55 - 135 U/L    AST 35 10 - 40 U/L    ALT 32 10 - 44 U/L    Anion Gap 8 8 - 16 mmol/L    eGFR if African American 49.2 (A) >60 mL/min/1.73 m^2    eGFR if non  42.6 (A) >60 mL/min/1.73 m^2   Magnesium    Collection Time: 01/08/20  6:44 AM   Result Value Ref Range    Magnesium 1.7 1.6 - 2.6 mg/dL   Phosphorus    Collection Time: 01/08/20  6:44 AM   Result Value Ref Range    Phosphorus 3.3 2.7 - 4.5 mg/dL   CBC with Automated Differential    Collection Time: 01/08/20  6:44 AM   Result Value Ref Range    WBC 5.74 3.90 - 12.70 K/uL    RBC 3.55 (L) 4.60 - 6.20 M/uL    Hemoglobin 10.3 (L) 14.0 - 18.0 g/dL     Hematocrit 30.7 (L) 40.0 - 54.0 %    Mean Corpuscular Volume 87 82 - 98 fL    Mean Corpuscular Hemoglobin 29.0 27.0 - 31.0 pg    Mean Corpuscular Hemoglobin Conc 33.6 32.0 - 36.0 g/dL    RDW 13.3 11.5 - 14.5 %    Platelets 109 (L) 150 - 350 K/uL    MPV 9.4 9.2 - 12.9 fL    Immature Granulocytes 0.3 0.0 - 0.5 %    Gran # (ANC) 3.6 1.8 - 7.7 K/uL    Immature Grans (Abs) 0.02 0.00 - 0.04 K/uL    Lymph # 1.3 1.0 - 4.8 K/uL    Mono # 0.6 0.3 - 1.0 K/uL    Eos # 0.1 0.0 - 0.5 K/uL    Baso # 0.03 0.00 - 0.20 K/uL    nRBC 0 0 /100 WBC    Gran% 63.4 38.0 - 73.0 %    Lymph% 23.3 18.0 - 48.0 %    Mono% 10.6 4.0 - 15.0 %    Eosinophil% 1.9 0.0 - 8.0 %    Basophil% 0.5 0.0 - 1.9 %    Differential Method Automated    POCT glucose    Collection Time: 01/08/20  8:15 AM   Result Value Ref Range    POCT Glucose 107 70 - 110 mg/dL         Significant Imaging: I have reviewed all pertinent imaging results/findings within the past 24 hours.      Assessment/Plan:      * CNS vasculitis failed imuran; on cytoxan  Patient presents with 2 week history of increased falls, worsened short term memory loss, behavior changes, and 1 day history of headache which has now resolved. On exam the patient has no new focal neurologic deficits. His inflammatory markers are within normal limits. Patient has been taking Cyclophosphamide for about 2 years with last dose in October. Per neurology notes(patient seen by Beti Boswell), he is supposed to be switched to Rituximab. Delay in treatment has been secondary to recent episode of sepsis in the setting of UTI. I do not have evidence to suggest flare right now and patient's headache is resolved.    Plan:  -Neurology and rheumatology consulted; appreciate recs  - Rheum recommending initiation of rituximab following resolution of MANISH while inpatient  - Initiation while inpatient may require approval from administration; Rheum made aware  -q4 neuro checks  -Will hold off on steroid administration at this  time        MANISH (acute kidney injury)  sCr on admission 2.3. Baseline is around 1.1. It is likely pre-renal in etiology in setting of decreased PO intake, low BP, tachycardia, dry on exam. Also on differential is post-obstructive and intrarenal. Renal u/s with 2 simple renal cysts. Patient would benefit from urine studies to elucidate etiology of MANISH.    Plan:  - Urine studies pending; nurse made aware of pending studies on 1/8  -Continue IVF 100cc/hr for 10 hours  -Daily BMP; repeat BMP ordered for 4pm 1/8; f/u  -Avoid nephrotoxic meds and renally dose all meds  -Holding home ARB  -Strict I/Os and daily standing weights           Dementia with behavioral disturbance  Medications: donepezil 5 mg qhs, seroquel 25 mg qd, and sertraline 100 mg qd    Plan:  -Continue above medications  -Delirium precautions in place      Osteoporosis with current pathological fracture with routine healing from steroids; compression fx 2017; 4/2019 bisphosphonate  Home medications: Fosamax 70 mg daily and Vitamin D 5000 units daily    Plan:  -Continue home bisphosphonate and Vit D supplementation      Impaired functional mobility, balance, gait, and endurance  Patient's wife reports patient with increased fall frequency. He was recently seen in ED on 1/3 after a fall and was treated for an abrasion on his ear.    Plan:  -Consult PT/OT. Appreciate dispo and DME recommendations.  -Fall precautions      Type 2 diabetes mellitus with diabetic polyneuropathy, with long-term current use of insulin  A1c 7.4 on 12/4. Home regimen: Insulin basaglar 25 units qhs, Insulin aspart sliding scale, Metformin 500 mg BID, Victoza 1.8mg daily    Plan:  -Hold home oral hyperglycemic agents  -Will continue 80% of long acting insulin--20 U detemir qhs +LDSSI  -Assess sliding scale requirements before introduction of prandial insulin  -Diabetic diet  -POCT glucose ACHS  -Hypoglyemia protocol       JOHN with CPAP at night  Patient reportedly was more difficult  to rouse overnight (more than baseline) and with confusion (more than baseline) overnight per wife who was at bedside. She notes that this resolved quickly following administration of CPAP to which the patient is only intermittently compliant    -Continue CPAP qhs  - Reinforced importance of using CPAP      Essential hypertension 5/2016 TTE diastolic dysfunction  Home medications: ibresartan 300 mg qhs, atenolol 50 mg qd, and diltiazem 240 mg qd  BP actually low on admission with associated tachycardia--suspect secondary to mild hypovolemia since now improved with LR administration    Plan:  -Continue atenolol and diltiazem   -Hold home ARB in the setting of MANISH      Mixed hyperlipidemia  Home medication: atorvastatin 40 mg    Plan:  -Continue home statin        VTE Risk Mitigation (From admission, onward)         Ordered     heparin (porcine) injection 5,000 Units  Every 8 hours      01/07/20 0058     IP VTE LOW RISK PATIENT  Once      01/07/20 0122     Place sequential compression device  Until discontinued      01/06/20 0207                      Eugenio Gunn MD  Department of Hospital Medicine   [facility]

## 2020-01-08 NOTE — SUBJECTIVE & OBJECTIVE
Interval History: Patient is comfortable this AM with wife at bedside. No new complaints. Patient was lethargic and difficult to arouse ON with RR called. Patient improved after CPAP was applied. Wife does state that this was new and lethargy was worse than prior occurrences but also states that he is always tired and sleeping. No HA overnight. Making good urine.     Current Facility-Administered Medications   Medication Frequency    0.9%  NaCl infusion Continuous    acetaminophen tablet 650 mg Q6H PRN    aspirin EC tablet 81 mg Daily    atenolol tablet 50 mg Daily    atorvastatin tablet 40 mg Daily    dextrose 10% (D10W) Bolus PRN    dextrose 10% (D10W) Bolus PRN    diltiaZEM 24 hr capsule 240 mg Daily    donepezil tablet 5 mg QHS    glucagon (human recombinant) injection 1 mg PRN    glucose chewable tablet 16 g PRN    glucose chewable tablet 24 g PRN    heparin (porcine) injection 5,000 Units Q8H    insulin aspart U-100 pen 0-5 Units QID (AC + HS) PRN    insulin detemir U-100 pen 16 Units QHS    QUEtiapine tablet 25 mg QHS    sertraline tablet 100 mg Daily    sodium chloride 0.9% flush 10 mL PRN    sodium chloride 0.9% flush 10 mL PRN    vitamin D 1000 units tablet 5,000 Units Daily     Objective:     Vital Signs (Most Recent):  Temp: 98.2 °F (36.8 °C) (01/08/20 0810)  Pulse: 75 (01/08/20 0810)  Resp: 18 (01/08/20 0810)  BP: (!) 165/89 (01/08/20 0810)  SpO2: 97 % (01/08/20 0810)  O2 Device (Oxygen Therapy): room air (01/08/20 0440) Vital Signs (24h Range):  Temp:  [97.2 °F (36.2 °C)-98.6 °F (37 °C)] 98.2 °F (36.8 °C)  Pulse:  [74-80] 75  Resp:  [12-20] 18  SpO2:  [94 %-97 %] 97 %  BP: (123-165)/(66-89) 165/89     Weight: 95.7 kg (211 lb) (01/08/20 0700)  Body mass index is 29.43 kg/m².  Body surface area is 2.19 meters squared.      Intake/Output Summary (Last 24 hours) at 1/8/2020 1101  Last data filed at 1/7/2020 1838  Gross per 24 hour   Intake 850 ml   Output --   Net 850 ml       Physical  Exam   Nursing note and vitals reviewed.  Constitutional: He is oriented to person, place, and time and well-developed, well-nourished, and in no distress. No distress.   HENT:   Head: Normocephalic and atraumatic.   Eyes: No scleral icterus.   Cardiovascular: Normal rate and regular rhythm.    Pulmonary/Chest: Effort normal and breath sounds normal.   Abdominal: Soft. He exhibits no distension.   Neurological: He is alert and oriented to person, place, and time. No cranial nerve deficit.   AAO x 3.  Knows where he is at, date, time, and the current president   Skin: Skin is warm and dry. No rash noted. No erythema.     Musculoskeletal: Normal range of motion. He exhibits no edema.         Significant Labs:  CBC:   Recent Labs   Lab 01/08/20  0644   WBC 5.74   HGB 10.3*   HCT 30.7*   *     CMP:   Recent Labs   Lab 01/08/20  0644   *   CALCIUM 9.1   ALBUMIN 3.7   PROT 6.7      K 4.5   CO2 24      BUN 29*   CREATININE 1.7*   ALKPHOS 77   ALT 32   AST 35   BILITOT 0.5       Significant Imaging:  Imaging results within the past 24 hours have been reviewed.

## 2020-01-09 ENCOUNTER — TELEPHONE (OUTPATIENT)
Dept: NEUROLOGY | Facility: CLINIC | Age: 60
End: 2020-01-09

## 2020-01-09 VITALS
DIASTOLIC BLOOD PRESSURE: 76 MMHG | TEMPERATURE: 99 F | SYSTOLIC BLOOD PRESSURE: 125 MMHG | RESPIRATION RATE: 18 BRPM | HEIGHT: 71 IN | WEIGHT: 207.25 LBS | HEART RATE: 68 BPM | OXYGEN SATURATION: 98 % | BODY MASS INDEX: 29.02 KG/M2

## 2020-01-09 LAB
ALBUMIN SERPL BCP-MCNC: 3.5 G/DL (ref 3.5–5.2)
ALP SERPL-CCNC: 75 U/L (ref 55–135)
ALT SERPL W/O P-5'-P-CCNC: 31 U/L (ref 10–44)
ANION GAP SERPL CALC-SCNC: 8 MMOL/L (ref 8–16)
AST SERPL-CCNC: 32 U/L (ref 10–40)
BASOPHILS # BLD AUTO: 0.02 K/UL (ref 0–0.2)
BASOPHILS NFR BLD: 0.4 % (ref 0–1.9)
BILIRUB SERPL-MCNC: 0.4 MG/DL (ref 0.1–1)
BUN SERPL-MCNC: 22 MG/DL (ref 8–23)
CALCIUM SERPL-MCNC: 8.9 MG/DL (ref 8.7–10.5)
CHLORIDE SERPL-SCNC: 111 MMOL/L (ref 95–110)
CO2 SERPL-SCNC: 23 MMOL/L (ref 23–29)
CREAT SERPL-MCNC: 1.3 MG/DL (ref 0.5–1.4)
DIFFERENTIAL METHOD: ABNORMAL
EOSINOPHIL # BLD AUTO: 0.1 K/UL (ref 0–0.5)
EOSINOPHIL NFR BLD: 2.8 % (ref 0–8)
ERYTHROCYTE [DISTWIDTH] IN BLOOD BY AUTOMATED COUNT: 13.4 % (ref 11.5–14.5)
EST. GFR  (AFRICAN AMERICAN): >60 ML/MIN/1.73 M^2
EST. GFR  (NON AFRICAN AMERICAN): 58.9 ML/MIN/1.73 M^2
GLUCOSE SERPL-MCNC: 120 MG/DL (ref 70–110)
HCT VFR BLD AUTO: 31.7 % (ref 40–54)
HGB BLD-MCNC: 10.4 G/DL (ref 14–18)
IMM GRANULOCYTES # BLD AUTO: 0.01 K/UL (ref 0–0.04)
IMM GRANULOCYTES NFR BLD AUTO: 0.2 % (ref 0–0.5)
LYMPHOCYTES # BLD AUTO: 1.2 K/UL (ref 1–4.8)
LYMPHOCYTES NFR BLD: 25.6 % (ref 18–48)
MAGNESIUM SERPL-MCNC: 1.5 MG/DL (ref 1.6–2.6)
MCH RBC QN AUTO: 28.6 PG (ref 27–31)
MCHC RBC AUTO-ENTMCNC: 32.8 G/DL (ref 32–36)
MCV RBC AUTO: 87 FL (ref 82–98)
MONOCYTES # BLD AUTO: 0.5 K/UL (ref 0.3–1)
MONOCYTES NFR BLD: 10.7 % (ref 4–15)
NEUTROPHILS # BLD AUTO: 2.8 K/UL (ref 1.8–7.7)
NEUTROPHILS NFR BLD: 60.3 % (ref 38–73)
NRBC BLD-RTO: 0 /100 WBC
PHOSPHATE SERPL-MCNC: 3.5 MG/DL (ref 2.7–4.5)
PLATELET # BLD AUTO: 107 K/UL (ref 150–350)
PMV BLD AUTO: 9.7 FL (ref 9.2–12.9)
POCT GLUCOSE: 117 MG/DL (ref 70–110)
POCT GLUCOSE: 148 MG/DL (ref 70–110)
POTASSIUM SERPL-SCNC: 4.1 MMOL/L (ref 3.5–5.1)
PROT SERPL-MCNC: 6.5 G/DL (ref 6–8.4)
RBC # BLD AUTO: 3.64 M/UL (ref 4.6–6.2)
SODIUM SERPL-SCNC: 142 MMOL/L (ref 136–145)
WBC # BLD AUTO: 4.69 K/UL (ref 3.9–12.7)

## 2020-01-09 PROCEDURE — 97110 THERAPEUTIC EXERCISES: CPT

## 2020-01-09 PROCEDURE — 84100 ASSAY OF PHOSPHORUS: CPT

## 2020-01-09 PROCEDURE — 99900035 HC TECH TIME PER 15 MIN (STAT)

## 2020-01-09 PROCEDURE — 99232 PR SUBSEQUENT HOSPITAL CARE,LEVL II: ICD-10-PCS | Mod: GC,,, | Performed by: INTERNAL MEDICINE

## 2020-01-09 PROCEDURE — 99232 SBSQ HOSP IP/OBS MODERATE 35: CPT | Mod: GC,,, | Performed by: INTERNAL MEDICINE

## 2020-01-09 PROCEDURE — 80053 COMPREHEN METABOLIC PANEL: CPT

## 2020-01-09 PROCEDURE — 99239 HOSP IP/OBS DSCHRG MGMT >30: CPT | Mod: GC,,, | Performed by: HOSPITALIST

## 2020-01-09 PROCEDURE — 99239 PR HOSPITAL DISCHARGE DAY,>30 MIN: ICD-10-PCS | Mod: GC,,, | Performed by: HOSPITALIST

## 2020-01-09 PROCEDURE — 83735 ASSAY OF MAGNESIUM: CPT

## 2020-01-09 PROCEDURE — 97116 GAIT TRAINING THERAPY: CPT

## 2020-01-09 PROCEDURE — 36415 COLL VENOUS BLD VENIPUNCTURE: CPT

## 2020-01-09 PROCEDURE — 97535 SELF CARE MNGMENT TRAINING: CPT

## 2020-01-09 PROCEDURE — 63600175 PHARM REV CODE 636 W HCPCS: Performed by: STUDENT IN AN ORGANIZED HEALTH CARE EDUCATION/TRAINING PROGRAM

## 2020-01-09 PROCEDURE — 94761 N-INVAS EAR/PLS OXIMETRY MLT: CPT

## 2020-01-09 PROCEDURE — 25000003 PHARM REV CODE 250: Performed by: STUDENT IN AN ORGANIZED HEALTH CARE EDUCATION/TRAINING PROGRAM

## 2020-01-09 PROCEDURE — 85025 COMPLETE CBC W/AUTO DIFF WBC: CPT

## 2020-01-09 PROCEDURE — 94660 CPAP INITIATION&MGMT: CPT

## 2020-01-09 RX ORDER — LORAZEPAM/0.9% SODIUM CHLORIDE 100MG/0.1L
2 PLASTIC BAG, INJECTION (ML) INTRAVENOUS ONCE
Status: COMPLETED | OUTPATIENT
Start: 2020-01-09 | End: 2020-01-09

## 2020-01-09 RX ADMIN — MELATONIN 5000 UNITS: at 09:01

## 2020-01-09 RX ADMIN — ATORVASTATIN CALCIUM 40 MG: 20 TABLET, FILM COATED ORAL at 09:01

## 2020-01-09 RX ADMIN — MAGNESIUM SULFATE IN WATER 2 G: 40 INJECTION, SOLUTION INTRAVENOUS at 02:01

## 2020-01-09 RX ADMIN — DILTIAZEM HYDROCHLORIDE 240 MG: 240 CAPSULE, COATED, EXTENDED RELEASE ORAL at 09:01

## 2020-01-09 RX ADMIN — HEPARIN SODIUM 5000 UNITS: 5000 INJECTION, SOLUTION INTRAVENOUS; SUBCUTANEOUS at 05:01

## 2020-01-09 RX ADMIN — ATENOLOL 50 MG: 50 TABLET ORAL at 09:01

## 2020-01-09 RX ADMIN — SERTRALINE HYDROCHLORIDE 100 MG: 100 TABLET ORAL at 09:01

## 2020-01-09 RX ADMIN — ASPIRIN 81 MG: 81 TABLET, DELAYED RELEASE ORAL at 09:01

## 2020-01-09 NOTE — PLAN OF CARE
Quiet and cooperative. Fall and injury free. Incontinent of bladder. Blood sugar checked. No sliding scale coverage needed. Bipap worn most of the night tolerated well. Bed low and locked. Side rails up x 2. Call bell in reach. Family member at bedside.

## 2020-01-09 NOTE — PROGRESS NOTES
[facility]  Neurology  Progress Note    Patient Name: Bessy Nunez  MRN: [id number]  Admission Date: 1/6/2020  Hospital Length of Stay: 3 days  Code Status: Full Code   Attending Provider: Jayesh Allen MD  Primary Care Physician: Edgar Nova MD   Principal Problem:CNS vasculitis      Subjective:     Interval History: Patient to be discharged today. First rituxan therapy on outpatient basis tomorrow.    Current Neurological Medications: None     Current Facility-Administered Medications   Medication Dose Route Frequency Provider Last Rate Last Dose    acetaminophen tablet 650 mg  650 mg Oral Q6H PRN Eugenio Gunn MD        aspirin EC tablet 81 mg  81 mg Oral Daily Jason Hercules MD   81 mg at 01/09/20 0949    atenolol tablet 50 mg  50 mg Oral Daily Jason Hercules MD   50 mg at 01/09/20 0949    atorvastatin tablet 40 mg  40 mg Oral Daily Jason Hercules MD   40 mg at 01/09/20 0950    dextrose 10% (D10W) Bolus  12.5 g Intravenous PRN Constance Rodriguez MD        dextrose 10% (D10W) Bolus  25 g Intravenous PRN Constance Rodriguez MD        diltiaZEM 24 hr capsule 240 mg  240 mg Oral Daily Jason Hercules MD   240 mg at 01/09/20 0950    donepezil tablet 5 mg  5 mg Oral QHS Jason Hercules MD   5 mg at 01/08/20 2100    glucagon (human recombinant) injection 1 mg  1 mg Intramuscular PRN Jason Hercules MD        glucose chewable tablet 16 g  16 g Oral PRN Jason Hercules MD        glucose chewable tablet 24 g  24 g Oral PRN Jason Hercules MD        heparin (porcine) injection 5,000 Units  5,000 Units Subcutaneous Q8H Jason Hercules MD   5,000 Units at 01/09/20 0541    insulin aspart U-100 pen 0-5 Units  0-5 Units Subcutaneous QID (AC + HS) PRN Jason Hercules MD        insulin detemir U-100 pen 16 Units  16 Units Subcutaneous QHS Jason Hercules MD   16 Units at 01/08/20 2136    magnesium sulfate 2 g/50 ml IVPB  2 g Intravenous Once Juana Okor, DO        QUEtiapine tablet 25 mg  25 mg Oral QHS  Jason Hercules MD   25 mg at 01/08/20 2136    [START ON 1/10/2020] sertraline tablet 150 mg  150 mg Oral Daily Juana Ndiaye DO        sodium chloride 0.9% flush 10 mL  10 mL Intravenous PRN Veronika Younger MD        sodium chloride 0.9% flush 10 mL  10 mL Intravenous PRN Jason Hercules MD        vitamin D 1000 units tablet 5,000 Units  5,000 Units Oral Daily Jason Hercules MD   5,000 Units at 01/09/20 0950       Review of Systems   Constitutional: Negative for chills and fever.   HENT: Negative for rhinorrhea, sneezing and sore throat.    Eyes: Negative for redness and visual disturbance.   Respiratory: Negative for cough and shortness of breath.    Cardiovascular: Negative for chest pain and palpitations.   Gastrointestinal: Negative for nausea and vomiting.   Genitourinary: Negative for dysuria, flank pain and hematuria.   Skin: Negative for pallor and rash.   Neurological: Negative for dizziness, light-headedness and headaches.   Psychiatric/Behavioral: Positive for confusion, decreased concentration and sleep disturbance.     Objective:     Vital Signs (Most Recent):  Temp: 98.5 °F (36.9 °C) (01/09/20 1131)  Pulse: 68 (01/09/20 1131)  Resp: 18 (01/09/20 1131)  BP: 125/76 (01/09/20 1131)  SpO2: 98 % (01/09/20 1131) Vital Signs (24h Range):  Temp:  [97.9 °F (36.6 °C)-98.5 °F (36.9 °C)] 98.5 °F (36.9 °C)  Pulse:  [68-78] 68  Resp:  [17-18] 18  SpO2:  [95 %-100 %] 98 %  BP: (125-172)/(76-84) 125/76     Weight: 94 kg (207 lb 3.7 oz)  Body mass index is 28.9 kg/m².    Physical Exam  Physical Exam  General: NAD, well nourished   Eyes: no tearing, discharge, no erythema   Cardiovascular: Warm and well perfused, pulses equal and symmetrical  Lungs: Normal work of breathing, normal chest wall excursions  Skin: No rash, lesions, or breakdown on exposed skin  Abdomen: soft, non tender, non distended  Extremeties: No cyanosis, clubbing or edema.        -------------------------------------------------------------  Facial  Expression: normal       Affect: Flat  Orientation to time & place:  Oriented to  Place and person. Not oriented to situation or time        Attention & concentration:  Decreased attention span and concentration       Memory:  Recent and remote memory not intact  Language: Spontaneous, fluent; able to repeat and name objects       Fund of knowledge:  Aware of current events        Speech:  normal (not dysarthric)  -------------------------------------------------------  Cranial nerves: visual fields full, pupils equal round and reactive, extraocular movements intact, facial sensation intact, face symmetrical, hearing intact to whisper, palate raises midline, shoulder shrug strength normal, tongue protrudes midline.        -------------------------------------------------------  Musculoskeletal  Muscle tone: all 4 extremities normal        Muscle Bulk: all 4 extremities normal        Muscle strength:  5/5 in all 4 extremities        No pronator drift  Sensation: Intact to light touch, pin prick, vibration in all extremities        Deep tendon Reflexes: 2+ bilateral biceps, triceps, patella and ankles        --------------------------------------------------------------  Cerebellar and Coordination  Gait:  deferred    Finger-nose: no dysmetria       --------------------------------------------------------------         Significant Labs:   BMP:   Recent Labs   Lab 01/08/20  0644 01/08/20  1544 01/09/20  0611   * 158* 120*    140 142   K 4.5 3.8 4.1    108 111*   CO2 24 24 23   BUN 29* 23 22   CREATININE 1.7* 1.5* 1.3   CALCIUM 9.1 9.3 8.9   MG 1.7  --  1.5*     CBC:   Recent Labs   Lab 01/08/20  0644 01/09/20  0611   WBC 5.74 4.69   HGB 10.3* 10.4*   HCT 30.7* 31.7*   * 107*     CMP:   Recent Labs   Lab 01/08/20  0644 01/08/20  1544 01/09/20  0611   * 158* 120*    140 142   K 4.5 3.8 4.1    108 111*   CO2 24 24 23   BUN 29* 23 22   CREATININE 1.7* 1.5* 1.3   CALCIUM 9.1 9.3  8.9   MG 1.7  --  1.5*   PROT 6.7  --  6.5   ALBUMIN 3.7  --  3.5   BILITOT 0.5  --  0.4   ALKPHOS 77  --  75   AST 35  --  32   ALT 32  --  31   ANIONGAP 8 8 8   EGFRNONAA 42.6* 49.5* 58.9*       Significant Imaging: I have reviewed all pertinent imaging results/findings within the past 24 hours.    Assessment and Plan:     * CNS vasculitis failed imuran; on cytoxan  60 y/o male with a medical history of CNS vasculitis (diagnosed in 2017 via angiogram-established patient of Dr. Durant and the multiple sclerosis clinic),  CHASE cirrhosis, DMII (A1c 7.4), JOHN on CPAP, and underlying dementia (established patient of Dr. Cross) consulted by hospital medicine for treatment of possible vasculitis flare and initiation of rituxan. Patient had initially failed cytoxan hiatus and transitioned to imuran in september 2018. He was then restarted on cytoxan in October 2018 which he was tolerating well. 12/4/19 MRI brain with/without showing chronic ischemic change, similar to MRI of 04/15/2019. Patient initially presented to the MS clinic yesterday due to a 8/10 headache which has resolved with the use of Advil/Aleve. Wife has mentioned that since October 2019 (when he was admitted to the hospital for sepsis 2/2 UTI), he has lost weight (10-15 lbs) and has cognitive decline. Please note that due to the recent hospital admission he was not able to receive his dose of cytoxan. Decision was made to switch him to rituxan. Neurology and rheumatology were consulted with admission so that he can get Rituxan as inpatient to avoid further delays with outpatient scheduling (if medically cleared). However due to his worsening kidney function would hold the treatment.     Plan   1) Scheduled for rituxan therapy as outpatient on 1/10/20     MANISH (acute kidney injury)  BUN/Cr elevated  -Possibly due to prerenal causes  -Resolving with IV fluid administration           VTE Risk Mitigation (From admission, onward)         Ordered     heparin  (porcine) injection 5,000 Units  Every 8 hours      01/07/20 0058     IP VTE LOW RISK PATIENT  Once      01/07/20 0122     Place sequential compression device  Until discontinued      01/06/20 9404                Saleem Rayo MD  Neurology  [facility]

## 2020-01-09 NOTE — TELEPHONE ENCOUNTER
Spoke with Bekah, pt's wife to schedule F/U with BB. Pt was admitted by AP on January 6; Pt scheduled for February 4, at 340PM.

## 2020-01-09 NOTE — TELEPHONE ENCOUNTER
----- Message from Tami Trivedi sent at 1/9/2020 11:11 AM CST -----  Contact: Pt Wife   Reason: Wife calling to schedule appt and give in update on pt.    Communication: [phone]

## 2020-01-09 NOTE — ASSESSMENT & PLAN NOTE
Patient presents with 2 week history of increased falls, worsened short term memory loss, behavior changes, and 1 day history of headache which has now resolved. On exam the patient has no new focal neurologic deficits. His inflammatory markers are within normal limits. Patient has been taking Cyclophosphamide for about 2 years with last dose in October. Per neurology notes(patient seen by Beti Boswell), he is supposed to be switched to Rituximab. Delay in treatment has been secondary to recent episode of sepsis in the setting of UTI. I do not have evidence to suggest flare right now and patient's headache is resolved.    Plan:  -Neurology and rheumatology consulted; appreciate recs  - Rheum recommending initiation of rituximab following resolution of MANISH while inpatient  - Initiation while inpatient may require approval from administration; Rheum made aware  -q4 neuro checks  -Will hold off on steroid administration at this time  - Given resolution of MANISH, plan to start Rituxan infusion outpatient 01/10.

## 2020-01-09 NOTE — ASSESSMENT & PLAN NOTE
Bessy Nunez is 60 yo M with CNS vasculitis diagnosed on MRI (not biopsy proven).  Patient appeared comfortable and AO to self, place, date, and president. Patient is non toxic appearing and afebrile. Hemodynamically stable. CBC without leukocytosis. Most recent MRI on 12/4 with consistent and chronic ischemic changes as noted previously. Patient has failed azathioprine therapy per previous notes. Cyclophosphamide, although successful, is becoming less so with decreasing remission of cognitive flares between treatments. Renal damage is apparent with sCr 2.3 (increased to 2.5 on 1/7) on admission, however, patient has also had poor appetite over the past several weeks and may be prerenal in etiology given improvement with just hydration. (Cr 1.7 - on admission).  Baseline creatinine 1.0-1.2.     Neurology consulted and evaluated patient.  Agrees with Rituxan therapy outpatient. Patient neurologically improving during admission.     Patient appears to be severely decondition - recommendation for PT      PLAN  -- MANISH improving with sCr 1.3, down trending (not at baseline) with just hydration.  This is likely to be pre-renal.  Renal U/S - only significant for R renal cysts.      -- No signs of infectious process - normal UA and CXR.  Normal WBC and no fever   -- Recommendation for PT  -- Neurology agrees outpatient Rituxan therapy   -- Initial therapy will be 1/10/19 at 9:30 AM  -- Second therapy will be on 1/25/19 at 8:00 AM this will be followed by therapy every 6 months   -- Outpatient rheumatology scheduled for Feb 05 with Dr. Durant @ 10am  -- Continue outpatient follow up with neurology as scheduled for Feb 04 with Dr. Love  -- Patient and wife educated on the importance of medication compliance

## 2020-01-09 NOTE — PT/OT/SLP PROGRESS
Occupational Therapy   Treatment    Name: Bessy Nunez  MRN: [id number]  Admitting Diagnosis:  CNS vasculitis       Recommendations:     Discharge Recommendations: home health OT  Discharge Equipment Recommendations:  walker, rolling, grab bar  Barriers to discharge:  None    Assessment:     Bessy Nunez is a 59 y.o. male with a medical diagnosis of CNS vasculitis.  He presents with impaired ADL and fx'l mobility performance deficits. Pt pleasant in therapy this date with session focus on bed mobility, sit<stand t/f, bedroom and bathroom ambulation, standing grooming tasks, LB dressing, and safe return to bed. Pt demo SBA with bed mobility and mobilized with no AD with CGA. Pt tolerated standing ~5 min at sink with little reported fatigue. Performance deficits affecting function are weakness, impaired endurance, impaired self care skills, gait instability, impaired balance, impaired cardiopulmonary response to activity. Pt would benefit from continued OT skilled services 2x/wk to improve daily living skills to optimize QOL. Pt is recommended to discharge to OT at this time.      Rehab Prognosis:  Good; patient would benefit from acute skilled OT services to address these deficits and reach maximum level of function.       Plan:     Patient to be seen 3 x/week to address the above listed problems via self-care/home management, therapeutic activities, therapeutic exercises  · Plan of Care Expires: 02/07/20  · Plan of Care Reviewed with: patient    Subjective     Pain/Comfort:  · Pain Rating 1: 0/10    Objective:     Communicated with: RN prior to session.  Patient found HOB elevated with bed alarm, peripheral IV upon OT entry to room.    General Precautions: Standard, fall   Orthopedic Precautions:N/A   Braces: N/A     Occupational Performance:     Bed Mobility:    · Patient completed Rolling/Turning to Right with stand by assistance  · Patient completed Scooting/Bridging with stand by assistance  · Patient  completed Supine to Sit with stand by assistance     Functional Mobility/Transfers:  · Patient completed Sit <> Stand Transfer with contact guard assistance  with  No AD  · Patient completed Bed <> Chair Transfer using Step Transfer technique with contact guard assistance with no AD   · Functional Mobility: Pt completed bedroom and bathroom ambulation with no AD and CGA.    Activities of Daily Living:  · Grooming: stand by assistance washing hands and brushing teeth standing at sink.  · Lower Body Dressing: total assistance doff/eugenia new brief as initial brief soiled. completed in standing       [facility] 6 Click ADL: 16    Treatment & Education:  Pt educated on role of occupational therapy, POC, and safety during ADLs and functional mobility. Pt and OT discussed importance of safe, continued mobility to optimize daily living skills. Pt verbalized understanding.   Pt completed the following during session: bed mobility, sit<Stand t/f, bedroom and bathroom mobility, standing grooming tasks (washing hands and brushing teeth), safe return to bed. See above for associated level of mobility performance White board updated during session. Pt given instruction to call for medical staff/nurse for assistance.       Patient left up in chair with all lines intact, call button in reach, RN Melly notified and pt's spouse presentEducation:      GOALS:   Multidisciplinary Problems     Occupational Therapy Goals        Problem: Occupational Therapy Goal    Goal Priority Disciplines Outcome Interventions   Occupational Therapy Goal     OT, PT/OT Ongoing, Progressing    Description:  Goals to be met by: 1/17/19     Patient will increase functional independence with ADLs by performing:      UE Dressing with Set-up Assistance.  LE Dressing with Maximum Assistance.  Grooming while standing with Stand-by Assistance.  Toileting from toilet with Stand-by Assistance for hygiene and clothing management.   Rolling to Bilateral with [place].    Supine to sit with [place].  Step transfer with Stand-by Assistance  Toilet transfer to toilet with Stand-by Assistance.                    Time Tracking:     OT Date of Treatment: 01/09/20  OT Start Time: 1034  OT Stop Time: 1102  OT Total Time (min): 28 min    Billable Minutes:Self Care/Home Management 28 min    Althea Ramos OT  1/9/2020

## 2020-01-09 NOTE — HOSPITAL COURSE
Patient with history of CNS vasculitis and dementia admitted to [facility] with ongoing HA with concern for CNS vasculitis flair. On admission, ESR was 35 and patient was noted to have an MANISH with Cr of 2.5 (bl 1.1) and hypomagnasemic. Rheum and neurology was consulted with recommendations for possible rituximab initiation as outpatient notes had previosly mentioned. Rheumatology and neurology noted that patient needed to be cleared of any active infection and MANISH prior to initiation. After MANISH was resolved, decision made to send patient home today and receive his first dose of rituxan on an outpatient basis

## 2020-01-09 NOTE — PT/OT/SLP PROGRESS
Physical Therapy Treatment    Patient Name:  Bessy Nunez   MRN:  [id number]    Recommendations:     Discharge Recommendations:  home with home health   Discharge Equipment Recommendations: walker, rolling, grab bar   Barriers to discharge: Inaccessible home    Assessment:     Bessy Nunez is a 59 y.o. male admitted with a medical diagnosis of CNS vasculitis.  He presents with the following impairments/functional limitations:  weakness, impaired balance, impaired self care skills, impaired functional mobilty, impaired endurance, gait instability, impaired cardiopulmonary response to activity.  Tolerated session c c/o fatigue.  Demo improved mobility - increased gait distance and decreased assistance level.  Performed mobility c CGA.  Pt amb short household distance and demo decreased gait speed, flat foot contact, mild unsteadiness and c/o fatigue.  Pt safe to amb c assistance of 1x person and RW.  Pt would benefit from continued skilled acute PT 3x/wk to improve functional mobility.      Rehab Prognosis: Good; patient would benefit from acute skilled PT services to address these deficits and reach maximum level of function.    Recent Surgery: * No surgery found *      Plan:     During this hospitalization, patient to be seen 3 x/week to address the identified rehab impairments via gait training, therapeutic activities, therapeutic exercises, neuromuscular re-education and progress toward the following goals:    · Plan of Care Expires:  02/07/20    Subjective     Chief Complaint: fatigue  Patient/Family Comments/goals: to return home  Pain/Comfort:  · Pain Rating 1: 0/10      Objective:     Communicated with RN prior to session.  Patient found HOB elevated with peripheral IV upon PT entry to room.     General Precautions: Standard, fall   Orthopedic Precautions:N/A   Braces: N/A     Functional Mobility:  · Bed Mobility:     · Rolling Left:  contact guard assistance  · Scooting: contact guard assistance  · Supine to  Sit: contact guard assistance  · Sit to Supine: contact guard assistance  · Transfers:     · Sit to Stand:  contact guard assistance with rolling walker  · Gait: 50ft c RW CGA   · demo decreased gait speed, flat foot contact, mild unsteadiness and c/o fatigue  · Balance: sitting (S); standing (CGA      AM-PAC 6 CLICK MOBILITY  Turning over in bed (including adjusting bedclothes, sheets and blankets)?: 4  Sitting down on and standing up from a chair with arms (e.g., wheelchair, bedside commode, etc.): 3  Moving from lying on back to sitting on the side of the bed?: 3  Moving to and from a bed to a chair (including a wheelchair)?: 3  Need to walk in hospital room?: 3  Climbing 3-5 steps with a railing?: 3  Basic Mobility Total Score: 19       Therapeutic Activities and Exercises:  Pt educated on: PT role/POC; safety c mobility; benefits of OOB activities; performing therex; d/c recs - v/u  -sat EOB x3mins  -sit<>stand 5x  -therex (LAQ, hip flex, AP)    Patient left HOB elevated with all lines intact, call button in reach, bed alarm on and RN notified..    GOALS:   Multidisciplinary Problems     Physical Therapy Goals        Problem: Physical Therapy Goal    Goal Priority Disciplines Outcome Goal Variances Interventions   Physical Therapy Goal     PT, PT/OT Ongoing, Progressing     Description:  Goals to be met by: 2019    Patient will increase functional independence with mobility by performin. Sit<>stand with RW with SPV.  2. Gait x 150 feet with RW with SPV.                      Time Tracking:     PT Received On: 20  PT Start Time: 1326     PT Stop Time: 1349  PT Total Time (min): 23 min     Billable Minutes: Gait Training 13 min and Therapeutic Exercise 10 min    Treatment Type: Treatment  PT/PTA: PT           Edgardo Nolasco, PT  2020

## 2020-01-09 NOTE — SUBJECTIVE & OBJECTIVE
Subjective:     Interval History: Patient to be discharged today. First rituxan therapy on outpatient basis tomorrow.    Current Neurological Medications: None     Current Facility-Administered Medications   Medication Dose Route Frequency Provider Last Rate Last Dose    acetaminophen tablet 650 mg  650 mg Oral Q6H PRN Eugenio Gunn MD        aspirin EC tablet 81 mg  81 mg Oral Daily Jason Hercules MD   81 mg at 01/09/20 0949    atenolol tablet 50 mg  50 mg Oral Daily Jason Hercules MD   50 mg at 01/09/20 0949    atorvastatin tablet 40 mg  40 mg Oral Daily Jason Hercules MD   40 mg at 01/09/20 0950    dextrose 10% (D10W) Bolus  12.5 g Intravenous PRN Constance Rodriguez MD        dextrose 10% (D10W) Bolus  25 g Intravenous PRN Constance Rodriguez MD        diltiaZEM 24 hr capsule 240 mg  240 mg Oral Daily Jason Hercules MD   240 mg at 01/09/20 0950    donepezil tablet 5 mg  5 mg Oral QHS Jason Hercules MD   5 mg at 01/08/20 2100    glucagon (human recombinant) injection 1 mg  1 mg Intramuscular PRN Jason Hercules MD        glucose chewable tablet 16 g  16 g Oral PRN Jason Hercules MD        glucose chewable tablet 24 g  24 g Oral PRN Jason Hercules MD        heparin (porcine) injection 5,000 Units  5,000 Units Subcutaneous Q8H Jason Hercules MD   5,000 Units at 01/09/20 0541    insulin aspart U-100 pen 0-5 Units  0-5 Units Subcutaneous QID (AC + HS) PRN Jason Hercules MD        insulin detemir U-100 pen 16 Units  16 Units Subcutaneous QHS Jason Hercules MD   16 Units at 01/08/20 2136    magnesium sulfate 2 g/50 ml IVPB  2 g Intravenous Once Juana Zelda, DO        QUEtiapine tablet 25 mg  25 mg Oral QHS Jason Hercules MD   25 mg at 01/08/20 2136    [START ON 1/10/2020] sertraline tablet 150 mg  150 mg Oral Daily Juana Zelda, DO        sodium chloride 0.9% flush 10 mL  10 mL Intravenous PRN Veronika Younger MD        sodium chloride 0.9% flush 10 mL  10 mL Intravenous PRN Jason Hercules MD        vitamin  D 1000 units tablet 5,000 Units  5,000 Units Oral Daily Jason Hercules MD   5,000 Units at 01/09/20 0950       Review of Systems   Constitutional: Negative for chills and fever.   HENT: Negative for rhinorrhea, sneezing and sore throat.    Eyes: Negative for redness and visual disturbance.   Respiratory: Negative for cough and shortness of breath.    Cardiovascular: Negative for chest pain and palpitations.   Gastrointestinal: Negative for nausea and vomiting.   Genitourinary: Negative for dysuria, flank pain and hematuria.   Skin: Negative for pallor and rash.   Neurological: Negative for dizziness, light-headedness and headaches.   Psychiatric/Behavioral: Positive for confusion, decreased concentration and sleep disturbance.     Objective:     Vital Signs (Most Recent):  Temp: 98.5 °F (36.9 °C) (01/09/20 1131)  Pulse: 68 (01/09/20 1131)  Resp: 18 (01/09/20 1131)  BP: 125/76 (01/09/20 1131)  SpO2: 98 % (01/09/20 1131) Vital Signs (24h Range):  Temp:  [97.9 °F (36.6 °C)-98.5 °F (36.9 °C)] 98.5 °F (36.9 °C)  Pulse:  [68-78] 68  Resp:  [17-18] 18  SpO2:  [95 %-100 %] 98 %  BP: (125-172)/(76-84) 125/76     Weight: 94 kg (207 lb 3.7 oz)  Body mass index is 28.9 kg/m².    Physical Exam  Physical Exam  General: NAD, well nourished   Eyes: no tearing, discharge, no erythema   Cardiovascular: Warm and well perfused, pulses equal and symmetrical  Lungs: Normal work of breathing, normal chest wall excursions  Skin: No rash, lesions, or breakdown on exposed skin  Abdomen: soft, non tender, non distended  Extremeties: No cyanosis, clubbing or edema.        -------------------------------------------------------------  Facial Expression: normal       Affect: Flat  Orientation to time & place:  Oriented to  Place and person. Not oriented to situation or time        Attention & concentration:  Decreased attention span and concentration       Memory:  Recent and remote memory not intact  Language: Spontaneous, fluent; able to  repeat and name objects       Fund of knowledge:  Aware of current events        Speech:  normal (not dysarthric)  -------------------------------------------------------  Cranial nerves: visual fields full, pupils equal round and reactive, extraocular movements intact, facial sensation intact, face symmetrical, hearing intact to whisper, palate raises midline, shoulder shrug strength normal, tongue protrudes midline.        -------------------------------------------------------  Musculoskeletal  Muscle tone: all 4 extremities normal        Muscle Bulk: all 4 extremities normal        Muscle strength:  5/5 in all 4 extremities        No pronator drift  Sensation: Intact to light touch, pin prick, vibration in all extremities        Deep tendon Reflexes: 2+ bilateral biceps, triceps, patella and ankles        --------------------------------------------------------------  Cerebellar and Coordination  Gait:  deferred    Finger-nose: no dysmetria       --------------------------------------------------------------         Significant Labs:   BMP:   Recent Labs   Lab 01/08/20  0644 01/08/20  1544 01/09/20  0611   * 158* 120*    140 142   K 4.5 3.8 4.1    108 111*   CO2 24 24 23   BUN 29* 23 22   CREATININE 1.7* 1.5* 1.3   CALCIUM 9.1 9.3 8.9   MG 1.7  --  1.5*     CBC:   Recent Labs   Lab 01/08/20  0644 01/09/20  0611   WBC 5.74 4.69   HGB 10.3* 10.4*   HCT 30.7* 31.7*   * 107*     CMP:   Recent Labs   Lab 01/08/20  0644 01/08/20  1544 01/09/20  0611   * 158* 120*    140 142   K 4.5 3.8 4.1    108 111*   CO2 24 24 23   BUN 29* 23 22   CREATININE 1.7* 1.5* 1.3   CALCIUM 9.1 9.3 8.9   MG 1.7  --  1.5*   PROT 6.7  --  6.5   ALBUMIN 3.7  --  3.5   BILITOT 0.5  --  0.4   ALKPHOS 77  --  75   AST 35  --  32   ALT 32  --  31   ANIONGAP 8 8 8   EGFRNONAA 42.6* 49.5* 58.9*       Significant Imaging: I have reviewed all pertinent imaging results/findings within the past 24 hours.

## 2020-01-09 NOTE — PLAN OF CARE
CM received notice that patient is to discharge home with outpatient Rituxan infusions.  CM called [phone] to schedule an outpatient infusion for tomorrow.  Infusion Center states that they are overbooked for tomorrow and will call back with a scheduled time.      11:00 AM  Shantell from [facility] called back and stated they are able to take patient tomorrow provided he discharges from the hospital today.  HECTOR confirmed with Dr. Allen that patient will be discharged today.  Shantell called with appointment scheduled for 1/10/2020 at 9:30 AM and follow up appointment scheduled for 1/24/2020 at 8:00 AM.

## 2020-01-09 NOTE — ASSESSMENT & PLAN NOTE
Home medications: ibresartan 300 mg qhs, atenolol 50 mg qd, and diltiazem 240 mg qd  BP actually low on admission with associated tachycardia--suspect secondary to mild hypovolemia since now improved with LR administration    Plan:  -Continue atenolol and diltiazem   -Resume Irbesartan

## 2020-01-09 NOTE — PLAN OF CARE
Problem: Physical Therapy Goal  Goal: Physical Therapy Goal  Description  Goals to be met by: 2019    Patient will increase functional independence with mobility by performin. Sit<>stand with RW with SPV.  2. Gait x 150 feet with RW with SPV.     Outcome: Ongoing, Progressing   Edgardo Nolasco, PT,DPT  2020

## 2020-01-09 NOTE — SUBJECTIVE & OBJECTIVE
Interval History: Patient comfortable this AM. No acute ON events. Slept without issue and lethargy improved after using CPAP per wife. Wife concerned about toe nails as they have been unable to see podiatrist. Wife feels overall patient is improving neurologically. Appetite is also improving.     Current Facility-Administered Medications   Medication Frequency    acetaminophen tablet 650 mg Q6H PRN    aspirin EC tablet 81 mg Daily    atenolol tablet 50 mg Daily    atorvastatin tablet 40 mg Daily    dextrose 10% (D10W) Bolus PRN    dextrose 10% (D10W) Bolus PRN    diltiaZEM 24 hr capsule 240 mg Daily    donepezil tablet 5 mg QHS    glucagon (human recombinant) injection 1 mg PRN    glucose chewable tablet 16 g PRN    glucose chewable tablet 24 g PRN    heparin (porcine) injection 5,000 Units Q8H    insulin aspart U-100 pen 0-5 Units QID (AC + HS) PRN    insulin detemir U-100 pen 16 Units QHS    QUEtiapine tablet 25 mg QHS    sertraline tablet 100 mg Daily    sodium chloride 0.9% flush 10 mL PRN    sodium chloride 0.9% flush 10 mL PRN    vitamin D 1000 units tablet 5,000 Units Daily     Objective:     Vital Signs (Most Recent):  Temp: 98 °F (36.7 °C) (01/09/20 0757)  Pulse: 72 (01/09/20 0757)  Resp: 18 (01/09/20 0757)  BP: (!) 172/84 (01/09/20 0757)  SpO2: 100 % (01/09/20 0757)  O2 Device (Oxygen Therapy): nasal cannula (01/09/20 0757) Vital Signs (24h Range):  Temp:  [97.9 °F (36.6 °C)-98.2 °F (36.8 °C)] 98 °F (36.7 °C)  Pulse:  [70-78] 72  Resp:  [17-18] 18  SpO2:  [93 %-100 %] 100 %  BP: (140-172)/(77-86) 172/84     Weight: 94 kg (207 lb 3.7 oz) (01/09/20 0600)  Body mass index is 28.9 kg/m².  Body surface area is 2.17 meters squared.    No intake or output data in the 24 hours ending 01/09/20 1002    Physical Exam   Nursing note and vitals reviewed.  Constitutional: He is well-developed, well-nourished, and in no distress. No distress.   HENT:   Head: Normocephalic and atraumatic.   Eyes:  Pupils are equal, round, and reactive to light. Right eye exhibits no discharge. Left eye exhibits no discharge. No scleral icterus.   Cardiovascular: Normal rate and regular rhythm.    No murmur heard.  Pulmonary/Chest: Effort normal and breath sounds normal. No respiratory distress.   Abdominal: Soft. Bowel sounds are normal. He exhibits no distension.   Neurological: He is alert.   Oriented to self, date, and president. Did not know he was in [facility].    Skin: Skin is warm and dry. He is not diaphoretic. No erythema.     Some scratch marks noted on the posterior left leg; wife states these are form toe nails   Musculoskeletal: Normal range of motion. He exhibits no edema.         Significant Labs:  CBC:   Recent Labs   Lab 01/09/20  0611   WBC 4.69   HGB 10.4*   HCT 31.7*   *     CMP:   Recent Labs   Lab 01/09/20  0611   *   CALCIUM 8.9   ALBUMIN 3.5   PROT 6.5      K 4.1   CO2 23   *   BUN 22   CREATININE 1.3   ALKPHOS 75   ALT 31   AST 32   BILITOT 0.4       Significant Imaging:  Imaging results within the past 24 hours have been reviewed.

## 2020-01-09 NOTE — ASSESSMENT & PLAN NOTE
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  Patient appeared comfortable and AO to self, place, date, and president. Patient is non toxic appearing and afebrile. Hemodynamically stable. CBC without leukocytosis. Most recent MRI on 12/4 with consistent and chronic ischemic changes as noted previously. Patient has failed azathioprine therapy per previous notes. Cyclophosphamide, although successful, is becoming less so with decreasing remission of cognitive flares between treatments. Renal damage is apparent with sCr 2.3 (increased to 2.5 on 1/7) on admission, however, patient has also had poor appetite over the past several weeks and may be prerenal in etiology given improvement with just hydration. (Cr 1.7 - on admission).  Baseline creatinine 1.0-1.2.     Neurology consulted and evaluated patient.  Agrees with Rituxan therapy outpatient. Patient neurologically improving during admission.     Patient appears to be severely decondition - recommendation for PT      PLAN  -- MANISH improving with sCr 1.3, down trending (not at baseline) with just hydration.  This is likely to be pre-renal.  Renal U/S - only significant for R renal cysts.      -- No signs of infectious process - normal UA and CXR.  Normal WBC and no fever   -- Recommendation for PT  -- Neurology agrees outpatient Rituxan therapy   -- Initial therapy will be 1/10/19 at 9:30 AM  -- Second therapy will be on 1/25/19 at 8:00 AM this will be followed by therapy every 6 months   -- Patient will need to follow with Rheumatology outpatient

## 2020-01-09 NOTE — PLAN OF CARE
Problem: Occupational Therapy Goal  Goal: Occupational Therapy Goal  Description  Goals to be met by: 1/17/19     Patient will increase functional independence with ADLs by performing:      UE Dressing with Set-up Assistance.  LE Dressing with Maximum Assistance.  Grooming while standing with Stand-by Assistance.  Toileting from toilet with Stand-by Assistance for hygiene and clothing management.   Rolling to Bilateral with Barry.   Supine to sit with Barry.  Step transfer with Stand-by Assistance  Toilet transfer to toilet with Stand-by Assistance.   Pt progressing well towards standing grooming goals. Continue OT as tolerated.  Althea Ramos OT  1/9/2020    Outcome: Ongoing, Progressing

## 2020-01-09 NOTE — PROGRESS NOTES
[facility]  Rheumatology  Progress Note    Patient Name: Bessy Nunez  MRN: [id number]  Admission Date: 1/6/2020  Hospital Length of Stay: 3 days  Code Status: Full Code   Attending Provider: Jayesh Allen MD  Primary Care Physician: Edgar Nova MD  Principal Problem: CNS vasculitis    Subjective:     HPI: Bessy Nunez is a 59 yo M for whom rheumatology was consulted for CNS vasculitis (diagnosed via MRI in 2017, not biopsy proven) and potential inpatient Rituximab. 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 He is a poor historian and most of the history obtained from wife. He presented to the ED from neurology clinic with cc of HA. It is in the frontotemporal region and is a 8/10 pain scale and occurred early on the afternoon of 1/7. He describes it as throbbing and progressive but resolved with Advil. Denies weakness, numbness, altered vision.     Wife also reports the patient has had decreased PO intake with 10lb weight loss over past 3 weeks with weakness and confusion increased from baseline along with worsening memory loss, loose stools, and increased falls. Wife is concerned as these symptoms are consistent with vasculitis flares. He has been on cyclophosphamide monthly for abut 2 years with last dose on 10/16/19. Following dose in November was held by hematologist with concern for cardiotoxicity, nephrotoxicity, and further bone marrow damage with suspected declining benefit with relapsing of flare-like symptoms occurring every 4, then 3, than 2 weeks between treatments. He was scheduled to begin Rituximab treatment outpatient but there was confusion with wife who believed he was to continue cyclophosphamide and initiation was held.     Neurologic abnormalities initially noted in 2017 when wife began to notice increasing falls, dizziness, confusion, and intermittent nystagmus. Patient was a  with [employer] but has been unable to return to work since diagnoses with difficulty working with computers. Flares would occur intermittently and randomly until reported excellent responce to cyclophosphamide and Ritalin. Per reports he had previously failed azathioprine therapy. Followed with Dr. Troy with Rheum for cyclophosphamide therapy. Last seen on 12/19/19 during which time Rituximab therapy was discussed.    No new subjective & objective note has been filed under this hospital service since the last note was generated.    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  Patient appeared comfortable and AO to self, place, date, and president. Patient is non toxic appearing and afebrile. Hemodynamically stable. CBC without leukocytosis. Most recent MRI on 12/4 with consistent and chronic ischemic changes as noted previously. Patient has failed azathioprine therapy per previous notes. Cyclophosphamide, although successful, is becoming less so with decreasing remission of cognitive flares between treatments. Renal damage is apparent with sCr 2.3 (increased to 2.5 on 1/7) on admission, however, patient has also had poor appetite over the past several weeks and may be prerenal in etiology given improvement with just hydration. (Cr 1.7 - on admission).  Baseline creatinine 1.0-1.2.     Neurology consulted and evaluated patient.  Agrees with Rituxan therapy outpatient. Patient neurologically improving during admission.     Patient appears to be severely decondition - recommendation for PT      PLAN  -- MANISH improving with sCr 1.3, down trending (not at baseline) with just hydration.  This is likely to be pre-renal.  Renal U/S - only significant for R renal cysts.      -- No signs of infectious process - normal UA and CXR.  Normal WBC and no fever   -- Recommendation for PT  -- Neurology agrees outpatient Rituxan therapy    -- Initial therapy will be 1/10/19 at 9:30 AM  -- Second therapy will be on 1/25/19 at 8:00 AM this will be followed by therapy every 6 months   -- Outpatient rheumatology scheduled for Feb 05 with Dr. Durant @ 10am  -- Continue outpatient follow up with neurology as scheduled for Feb 04 with Dr. Love  -- Patient and wife educated on the importance of medication compliance          Emily Cabrera MD  Rheumatology  [facility]    RHEUMATOLOGY ATTENDING:  Patient presented, personally interviewed and examined, medical records reviewed.  Very much improved today, subjectively & objectively.  Renal function almost normal.  1st dose of RTX arranged for tomorrow.  Has f/u with Sunil Love & Bhargav early next month  Findings & plan discussed with patient, his wife and Dr. Cabrera whose note and assessment I agree with.

## 2020-01-09 NOTE — ASSESSMENT & PLAN NOTE
Patient's wife reports patient with increased fall frequency. He was recently seen in ED on 1/3 after a fall and was treated for an abrasion on his ear.    Plan:  -Consult PT/OT. Appreciate dispo and DME recommendations.  -Fall precautions  -Home health for PT/OT on discharge

## 2020-01-09 NOTE — PROGRESS NOTES
[facility]  Rheumatology  Progress Note    Patient Name: Bessy Nunez  MRN: [id number]  Admission Date: 1/6/2020  Hospital Length of Stay: 3 days  Code Status: Full Code   Attending Provider: Jayesh Allen MD  Primary Care Physician: Edgar Nova MD  Principal Problem: CNS vasculitis    Subjective:     HPI: Bessy Nunez is a 59 yo M for whom rheumatology was consulted for CNS vasculitis (diagnosed via MRI in 2017, not biopsy proven) and potential inpatient Rituximab. 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 He is a poor historian and most of the history obtained from wife. He presented to the ED from neurology clinic with cc of HA. It is in the frontotemporal region and is a 8/10 pain scale and occurred early on the afternoon of 1/7. He describes it as throbbing and progressive but resolved with Advil. Denies weakness, numbness, altered vision.     Wife also reports the patient has had decreased PO intake with 10lb weight loss over past 3 weeks with weakness and confusion increased from baseline along with worsening memory loss, loose stools, and increased falls. Wife is concerned as these symptoms are consistent with vasculitis flares. He has been on cyclophosphamide monthly for abut 2 years with last dose on 10/16/19. Following dose in November was held by hematologist with concern for cardiotoxicity, nephrotoxicity, and further bone marrow damage with suspected declining benefit with relapsing of flare-like symptoms occurring every 4, then 3, than 2 weeks between treatments. He was scheduled to begin Rituximab treatment outpatient but there was confusion with wife who believed he was to continue cyclophosphamide and initiation was held.     Neurologic abnormalities initially noted in 2017 when wife began to notice increasing falls, dizziness, confusion, and intermittent nystagmus. Patient was a  with [employer] but has been unable to return to work since diagnoses with difficulty working with computers. Flares would occur intermittently and randomly until reported excellent responce to cyclophosphamide and Ritalin. Per reports he had previously failed azathioprine therapy. Followed with Dr. Troy with Rheum for cyclophosphamide therapy. Last seen on 12/19/19 during which time Rituximab therapy was discussed.    Interval History: Patient comfortable this AM. No acute ON events. Slept without issue and lethargy improved after using CPAP per wife. Wife concerned about toe nails as they have been unable to see podiatrist. Wife feels overall patient is improving neurologically. Appetite is also improving.     Current Facility-Administered Medications   Medication Frequency    acetaminophen tablet 650 mg Q6H PRN    aspirin EC tablet 81 mg Daily    atenolol tablet 50 mg Daily    atorvastatin tablet 40 mg Daily    dextrose 10% (D10W) Bolus PRN    dextrose 10% (D10W) Bolus PRN    diltiaZEM 24 hr capsule 240 mg Daily    donepezil tablet 5 mg QHS    glucagon (human recombinant) injection 1 mg PRN    glucose chewable tablet 16 g PRN    glucose chewable tablet 24 g PRN    heparin (porcine) injection 5,000 Units Q8H    insulin aspart U-100 pen 0-5 Units QID (AC + HS) PRN    insulin detemir U-100 pen 16 Units QHS    QUEtiapine tablet 25 mg QHS    sertraline tablet 100 mg Daily    sodium chloride 0.9% flush 10 mL PRN    sodium chloride 0.9% flush 10 mL PRN    vitamin D 1000 units tablet 5,000 Units Daily     Objective:     Vital Signs (Most Recent):  Temp: 98 °F (36.7 °C) (01/09/20 0757)  Pulse: 72 (01/09/20 0757)  Resp: 18 (01/09/20 0757)  BP: (!) 172/84 (01/09/20 0757)  SpO2: 100 % (01/09/20 0757)  O2 Device (Oxygen Therapy): nasal cannula (01/09/20 0757) Vital Signs (24h Range):  Temp:  [97.9 °F (36.6 °C)-98.2 °F (36.8 °C)] 98 °F (36.7 °C)  Pulse:  [70-78] 72  Resp:  [17-18] 18  SpO2:  [93 %-100 %] 100  %  BP: (140-172)/(77-86) 172/84     Weight: 94 kg (207 lb 3.7 oz) (01/09/20 0600)  Body mass index is 28.9 kg/m².  Body surface area is 2.17 meters squared.    No intake or output data in the 24 hours ending 01/09/20 1002    Physical Exam   Nursing note and vitals reviewed.  Constitutional: He is well-developed, well-nourished, and in no distress. No distress.   HENT:   Head: Normocephalic and atraumatic.   Eyes: Pupils are equal, round, and reactive to light. Right eye exhibits no discharge. Left eye exhibits no discharge. No scleral icterus.   Cardiovascular: Normal rate and regular rhythm.    No murmur heard.  Pulmonary/Chest: Effort normal and breath sounds normal. No respiratory distress.   Abdominal: Soft. Bowel sounds are normal. He exhibits no distension.   Neurological: He is alert.   Oriented to self, date, and president. Did not know he was in [facility].    Skin: Skin is warm and dry. He is not diaphoretic. No erythema.     Some scratch marks noted on the posterior left leg; wife states these are form toe nails   Musculoskeletal: Normal range of motion. He exhibits no edema.         Significant Labs:  CBC:   Recent Labs   Lab 01/09/20  0611   WBC 4.69   HGB 10.4*   HCT 31.7*   *     CMP:   Recent Labs   Lab 01/09/20  0611   *   CALCIUM 8.9   ALBUMIN 3.5   PROT 6.5      K 4.1   CO2 23   *   BUN 22   CREATININE 1.3   ALKPHOS 75   ALT 31   AST 32   BILITOT 0.4       Significant Imaging:  Imaging results within the past 24 hours have been reviewed.    Assessment/Plan:     * CNS vasculitis failed imuran; on cytoxan  Bessy Nunez is 60 yo M with CNS vasculitis diagnosed on MRI (not biopsy proven).  Patient appeared comfortable and AO to self, place, date, and president. Patient is non toxic appearing and afebrile. Hemodynamically stable. CBC without leukocytosis. Most recent MRI on 12/4 with consistent and chronic ischemic changes as noted previously. Patient has failed azathioprine  therapy per previous notes. Cyclophosphamide, although successful, is becoming less so with decreasing remission of cognitive flares between treatments. Renal damage is apparent with sCr 2.3 (increased to 2.5 on 1/7) on admission, however, patient has also had poor appetite over the past several weeks and may be prerenal in etiology given improvement with just hydration. (Cr 1.7 - on admission).  Baseline creatinine 1.0-1.2.     Neurology consulted and evaluated patient.  Agrees with Rituxan therapy outpatient. Patient neurologically improving during admission.     Patient appears to be severely decondition - recommendation for PT      PLAN  -- MANISH improving with sCr 1.3, down trending (not at baseline) with just hydration.  This is likely to be pre-renal.  Renal U/S - only significant for R renal cysts.      -- No signs of infectious process - normal UA and CXR.  Normal WBC and no fever   -- Recommendation for PT  -- Neurology agrees outpatient Rituxan therapy   -- Initial therapy will be 1/10/19 at 9:30 AM  -- Second therapy will be on 1/25/19 at 8:00 AM this will be followed by therapy every 6 months   -- Patient will need to follow with Rheumatology outpatient     MANISH (acute kidney injury)  Treatment as per primary         Guevara García MD  Rheumatology  [facility]

## 2020-01-09 NOTE — PLAN OF CARE
SW faxed HH referral to [facility] via  for review. JULIAN will continue to follow.      01/09/20 1358   Post-Acute Status   Post-Acute Authorization Home Health/Hospice   Home Health/Hospice Status Referrals Sent     Adriane Rizo LMSW   - [facility]  Ext. [phone]

## 2020-01-09 NOTE — PLAN OF CARE
[facility]    HOME HEALTH ORDERS  FACE TO FACE ENCOUNTER    Patient Name: Bessy Nunez  YOB: 1960    PCP: Edgar Nova MD   PCP Address: [address] / [place] [address]  PCP Phone Number: [phone]  PCP Fax: [phone]    Encounter Date: 01/09/2020    Admit to Home Health    Diagnoses:  Active Hospital Problems    Diagnosis  POA    *CNS vasculitis failed imuran; on cytoxan [I77.6]  Yes     Failed Cytoxan hiatus and transition to Imuran Sept/Oct 2018; restarted on cytoxan  12/4/19 MRI brain with/without: Chronic ischemic change as further detailed above, similar to MRI of 04/15/2019.  Multiple small foci of prior hemorrhage in the cerebellum and jose, possible sequela from hypertension.  No evidence of recent infarction or other acute intracranial pathology.      MANISH (acute kidney injury) [N17.9]  Yes    Dementia with behavioral disturbance [F03.91]  Yes    Osteoporosis with current pathological fracture with routine healing from steroids; compression fx 2017; 4/2019 bisphosphonate [M80.00XD]  Not Applicable    Impaired functional mobility, balance, gait, and endurance [Z74.09]  Yes    Type 2 diabetes mellitus with diabetic polyneuropathy, with long-term current use of insulin [E11.42, Z79.4]  Not Applicable     JOHN with CPAP at night [G47.33]  Yes    Mixed hyperlipidemia [E78.2]  Yes    Essential hypertension 5/2016 TTE diastolic dysfunction [I10]  Yes     5/11/2016 TTE:   1 - Mildly depressed left ventricular systolic function (EF 45-50%).  Mild global hypokinesis at rest. 2 - Eccentric hypertrophy. 3 - Left ventricular diastolic dysfunction.        Resolved Hospital Problems   No resolved problems to display.       Future Appointments   Date Time Provider Department Center   1/10/2020  9:30 AM [facility]   1/24/2020  8:00 AM NURSE 6, [facility]   1/30/2020  1:00 PM Edgar Nova MD [facility]    2/5/2020 10:00 AM Kay Troy MD [facility] RHEUM [place] [facility]   2/7/2020  1:00 PM DC, [facility] OPHTHAL [place] [facility]   2/7/2020  1:30 PM Lobito Willson MD [facility] OPHTHAL [place] [facility]           I have seen and examined this patient face to face today. My clinical findings that support the need for the home health skilled services and home bound status are the following:  Weakness/numbness causing balance and gait disturbance due to Weakness/Debility and CNS Vasculitis making it taxing to leave home.    Allergies:Review of patient's allergies indicates:  No Known Allergies    Diet: diabetic diet: 2000 calorie    Activities: activity as tolerated    Nursing:   SN to complete comprehensive assessment including routine vital signs. Instruct on disease process and s/s of complications to report to MD. Review/verify medication list sent home with the patient at time of discharge  and instruct patient/caregiver as needed. Frequency may be adjusted depending on start of care date.    Notify MD if SBP > 160 or < 90; DBP > 90 or < 50; HR > 120 or < 50; Temp > 101; Other:         CONSULTS:    Physical Therapy to evaluate and treat. Evaluate for home safety and equipment needs; Establish/upgrade home exercise program. Perform / instruct on therapeutic exercises, gait training, transfer training, and Range of Motion.  Occupational Therapy to evaluate and treat. Evaluate home environment for safety and equipment needs. Perform/Instruct on transfers, ADL training, ROM, and therapeutic exercises.  Aide to provide assistance with personal care, ADLs, and vital signs.    MISCELLANEOUS CARE:  n/a    WOUND CARE ORDERS  n/a      Medications: Review discharge medications with patient and family and provide education.      Current Discharge Medication List      CONTINUE these medications which have NOT CHANGED    Details   alendronate (FOSAMAX) 70 MG tablet Take 1 tablet (70 mg total) by mouth every 7 days.  Qty: 4 tablet,  Refills: 11    Associated Diagnoses: Other osteoporosis with current pathological fracture with routine healing, subsequent encounter      aspirin (ECOTRIN) 81 MG EC tablet Take 81 mg by mouth once daily.      atenolol (TENORMIN) 50 MG tablet Take 1 tablet (50 mg total) by mouth once daily.  Qty: 90 tablet, Refills: 3    Associated Diagnoses: Essential hypertension      atorvastatin (LIPITOR) 40 MG tablet TAKE 1 TABLET BY MOUTH ONCE DAILY  Qty: 90 tablet, Refills: 3    Associated Diagnoses: Mixed hyperlipidemia      blood sugar diagnostic (TRUE METRIX GLUCOSE TEST STRIP) Strp Test blood sugar four times a day  Qty: 400 each, Refills: 11    Associated Diagnoses: Controlled type 2 diabetes mellitus with diabetic nephropathy, with long-term current use of insulin      diltiaZEM (DILT-XR) 240 MG CDCR Take 1 capsule (240 mg total) by mouth once daily.  Qty: 90 capsule, Refills: 3    Associated Diagnoses: Essential hypertension      donepezil (ARICEPT) 5 MG tablet Take 1 tablet (5 mg total) by mouth every evening.  Qty: 90 tablet, Refills: 3    Associated Diagnoses: Cognitive impairment      flash glucose sensor (FREESTYLE ARELY 14 DAY SENSOR) Kit Glucose checking 4 times a day  Qty: 2 kit, Refills: 11    Associated Diagnoses: Uncontrolled type 2 diabetes mellitus with hyperglycemia      FREESTYLE ARELY 14 DAY READER Misc GLUCOSE TESTING : FASTING AND PRIOR TO MEALS  Qty: 1 each, Refills: 0    Comments: Please consider 90 day supplies to promote better adherence  Associated Diagnoses: Uncontrolled type 2 diabetes mellitus with hyperglycemia      insulin (BASAGLAR KWIKPEN U-100 INSULIN) glargine 100 units/mL (3mL) SubQ pen Inject 25 Units into the skin once daily. Up to 60 BID with cytoxan  Qty: 2 Box, Refills: 11    Associated Diagnoses: Type 2 diabetes mellitus with hyperglycemia, with long-term current use of insulin; Controlled type 2 diabetes mellitus with diabetic nephropathy, with long-term current use of insulin     "  insulin aspart U-100 (NOVOLOG) 100 unit/mL (3 mL) InPn pen Inject 1-10 Units into the skin before meals and at bedtime as needed (Hyperglycemia).  Qty: 3 mL, Refills: 0      insulin syringe-needle U-100 (INSULIN SYRINGE) 1/2 mL 30 gauge x 5/16 Syrg Use 3x/day  Qty: 300 each, Refills: 3      irbesartan (AVAPRO) 300 MG tablet TAKE 1 TABLET BY MOUTH ONCE DAILY IN THE EVENING  Qty: 90 tablet, Refills: 3    Associated Diagnoses: Essential hypertension      lancets 30 gauge Misc Test blood sugar four times a day  Qty: 100 each, Refills: 11      liraglutide 0.6 mg/0.1 mL, 18 mg/3 mL, subq PNIJ (VICTOZA 3-TOMASZ) 0.6 mg/0.1 mL (18 mg/3 mL) PnIj Inject 1.8 mg into the skin once daily.  Qty: 9 mL, Refills: 11    Associated Diagnoses: Type 2 diabetes mellitus with hyperglycemia, with long-term current use of insulin; Controlled type 2 diabetes mellitus with diabetic nephropathy, with long-term current use of insulin      metFORMIN (GLUCOPHAGE-XR) 500 MG 24 hr tablet Take 2 tablets (1,000 mg total) by mouth 2 (two) times daily with meals.  Qty: 360 tablet, Refills: 3    Associated Diagnoses: Type 2 diabetes mellitus with hyperglycemia, with long-term current use of insulin; Controlled type 2 diabetes mellitus with diabetic nephropathy, with long-term current use of insulin      methylphenidate HCl (RITALIN) 10 MG tablet Take 1 tablet (10 mg total) by mouth 2 (two) times daily with meals.  Qty: 60 tablet, Refills: 0    Associated Diagnoses: Fatigue, unspecified type      omega-3 fatty acids-vitamin E (FISH OIL) 1,000 mg Cap Take 1 capsule by mouth 2 (two) times daily.  Refills: 0    Associated Diagnoses: Hypertriglyceridemia      pen needle, diabetic (BD ULTRA-FINE ANA PEN NEEDLE) 32 gauge x 5/32" Ndle 4x daily insulin pen needle, relion  Qty: 400 each, Refills: 3      QUEtiapine (SEROQUEL) 25 MG Tab Take 1 tablet (25 mg total) by mouth every evening.  Qty: 30 tablet, Refills: 11    Associated Diagnoses: Dementia with behavioral " disturbance, unspecified dementia type      sertraline (ZOLOFT) 100 MG tablet 1.5 tablet daily  Qty: 135 tablet, Refills: 3    Comments: Please consider 90 day supplies to promote better adherence  Associated Diagnoses: Major depressive disorder with single episode, in full remission      vitamin D 1000 units Tab Take 5 tablets (5,000 Units total) by mouth once daily.             I certify that this patient is confined to his home and needs physical therapy and occupational therapy.

## 2020-01-09 NOTE — ASSESSMENT & PLAN NOTE
sCr on admission 2.3. Baseline is around 1.1. It is likely pre-renal in etiology in setting of decreased PO intake, low BP, tachycardia, dry on exam. Also on differential is post-obstructive and intrarenal. Renal u/s with 2 simple renal cysts. Patient would benefit from urine studies to elucidate etiology of MANISH.    Plan:  -Continue IVF 100cc/hr for 10 hours  -Daily BMP; repeat BMP ordered for 4pm 1/8; f/u  -Avoid nephrotoxic meds and renally dose all meds  -Strict I/Os and daily standing weights   - Improved, Cr at 1.3 on discharge.

## 2020-01-09 NOTE — DISCHARGE SUMMARY
[facility] Hospital Medicine  Discharge Summary      Patient Name: Bessy Nunez  MRN: [id number]  Admission Date: 1/6/2020  Hospital Length of Stay: 3 days  Discharge Date and Time:  01/09/2020 3:44 PM  Attending Physician: Jayesh Allen MD   Discharging Provider: Juana Ndiaye DO  Primary Care Provider: Edgar Nova MD  Hospital Medicine Team: [facility] HOSP MED 1 Juana Ndiaye DO    HPI:   Mr. Nunez is a 60 yo male with past medical history of CNS vasculitis, CVA, internuclear opthalmoplegia, CHASE cirrhosis, DMII(A1c 7.4), HTN, HLD, JOHN on CPAP, Dementia, and osteoporosis who presents with chief complaint of headache that occurred this morning. Much of the history is obtained from wife at bedside as patient is a poor historian/has underlying dementia and short term memory loss. The patient experienced 8/10 frontoparietal headache this morning that lasted for about 1-2 hours. He is unable to characterize the headache. His wife says he normally does not experience headaches outside of his CNS vasculitis. His headache essentially went away with advil administration. He did not have any weakness, numbness, blurred vision at the time of the headache.    The patient does not have any complaints at this time, however the wife reports he has not been doing so well for the last 2 weeks. He has had decreased PO intake, 10lb weight loss over the last 3 weeks, generalized weakness, confusion increased from baseline characterized by memory loss, loose bowel movement, and increased frequency of falls. She says that he just does not seem like himself. She says that she is concerned because he has been off of his Cyclophosphamide treatment for his CNS vasculitis and that some of the symptoms he is experiencing such as falls, confusion, headache are seen with flares. He has been taking cyclophosphamide monthly for about 2 years and has been relatively well controlled on this medication up until recently. He not  received it since October due to recent hospital admission for sepsis 2/2 UTI and per Neurology notes, there have been plans to switch him to Rituximab. He follows with Beti Boswell and most recently saw her today. She sent the patient to the ED for inpatient neurology and rheumatology eval and for potential inpatient Rituximab.     In the ED, patient afebrile, tachycardic in low 100s with soft /63. He is satting 96% on room air. His labs are significant for normal WBC, H/H stable, mildly hyponatremic Na 135, elevation in BUN and sCr 25/2.3(baseline 1.1), normal CRP and ESR, UA with 2+protein and 6 RBCs. He did not have any imaging done in the ED. He received 1L LR bolus with improvement in his BP and tachycardia.    * No surgery found *      Hospital Course:   Patient with history of CNS vasculitis and dementia admitted to [facility] with ongoing HA with concern for CNS vasculitis flair. On admission, ESR was 35 and patient was noted to have an MANISH with Cr of 2.5 (bl 1.1) and hypomagnasemic. Rheum and neurology was consulted with recommendations for possible rituximab initiation as outpatient notes had previosly mentioned. Rheumatology noted that patient needed to be cleared of any active infection and MANISH prior to initiation. Concern for ongoing vasculitis flair or infection was limited by low CRP as well as lack of fever, chills, rigor, or leukocytosis. Magnesium was repleted and patient was initiated on continuous fluids with improvement of MANISH. Patient with Cr of 1.3 on discharge. After discussion with Rheumatology and Neurology, decision was made to start patient on Rituxan infusions outpatient. He is scheduled for his first infusion 01/10. Patient to have home health PT/OT on discharge with outpatient follow up with rheumatology.     Consults:   Consults (From admission, onward)        Status Ordering Provider     Inpatient consult to Neurology  Once     Provider:  (Not yet assigned)    Adriano SOTO  EARNESTINE     Inpatient consult to Rheumatology  Once     Provider:  (Not yet assigned)    Completed EARNESTINE SOTO           Physical Exam   Constitutional: He is oriented to person, place, and time. He appears well-developed and well-nourished. No distress.   HENT:   Head: Normocephalic and atraumatic.   Eyes: Conjunctivae and EOM are normal. No scleral icterus.   Neck: Normal range of motion. Neck supple. No JVD present.   Cardiovascular: Normal rate, regular rhythm, normal heart sounds and intact distal pulses.   No murmur heard.  Pulmonary/Chest: Effort normal and breath sounds normal. No respiratory distress.   Abdominal: Soft. Bowel sounds are normal. He exhibits no distension. There is no tenderness. There is no guarding.   Musculoskeletal: Normal range of motion. He exhibits no edema or tenderness.   Neurological: He is alert and oriented to person, place, and time.   Skin: Skin is warm and dry. He is not diaphoretic. No erythema. No pallor.        * CNS vasculitis failed imuran; on cytoxan  Patient presents with 2 week history of increased falls, worsened short term memory loss, behavior changes, and 1 day history of headache which has now resolved. On exam the patient has no new focal neurologic deficits. His inflammatory markers are within normal limits. Patient has been taking Cyclophosphamide for about 2 years with last dose in October. Per neurology notes(patient seen by Beti Boswell), he is supposed to be switched to Rituximab. Delay in treatment has been secondary to recent episode of sepsis in the setting of UTI. I do not have evidence to suggest flare right now and patient's headache is resolved.    Plan:  -Neurology and rheumatology consulted; appreciate recs  - Rheum recommending initiation of rituximab following resolution of MANISH while inpatient  - Initiation while inpatient may require approval from administration; Rheum made aware  -q4 neuro checks  -Will hold off on steroid administration at  this time  - Given resolution of MANISH, plan to start Rituxan infusion outpatient 01/10.        MANISH (acute kidney injury)  sCr on admission 2.3. Baseline is around 1.1. It is likely pre-renal in etiology in setting of decreased PO intake, low BP, tachycardia, dry on exam. Also on differential is post-obstructive and intrarenal. Renal u/s with 2 simple renal cysts. Patient would benefit from urine studies to elucidate etiology of MANISH.    Plan:  -Continue IVF 100cc/hr for 10 hours  -Daily BMP; repeat BMP ordered for 4pm 1/8; f/u  -Avoid nephrotoxic meds and renally dose all meds  -Strict I/Os and daily standing weights   - Improved, Cr at 1.3 on discharge.           Dementia with behavioral disturbance  Medications: donepezil 5 mg qhs, seroquel 25 mg qd, and sertraline 100 mg qd    Plan:  -Continue above medications  -Delirium precautions in place      Osteoporosis with current pathological fracture with routine healing from steroids; compression fx 2017; 4/2019 bisphosphonate  Home medications: Fosamax 70 mg daily and Vitamin D 5000 units daily    Plan:  -Continue home bisphosphonate and Vit D supplementation      Impaired functional mobility, balance, gait, and endurance  Patient's wife reports patient with increased fall frequency. He was recently seen in ED on 1/3 after a fall and was treated for an abrasion on his ear.    Plan:  -Consult PT/OT. Appreciate dispo and DME recommendations.  -Fall precautions  -Home health for PT/OT on discharge       JOHN with CPAP at night  Patient reportedly was more difficult to rouse overnight (more than baseline) and with confusion (more than baseline) overnight per wife who was at bedside. She notes that this resolved quickly following administration of CPAP to which the patient is only intermittently compliant    -Continue CPAP qhs  - Reinforced importance of using CPAP      Essential hypertension 5/2016 TTE diastolic dysfunction  Home medications: ibresartan 300 mg qhs, atenolol  "50 mg qd, and diltiazem 240 mg qd  BP actually low on admission with associated tachycardia--suspect secondary to mild hypovolemia since now improved with LR administration    Plan:  -Continue atenolol and diltiazem   -Resume Irbesartan    Mixed hyperlipidemia  Home medication: atorvastatin 40 mg    Plan:  -Continue home statin        Final Active Diagnoses:    Diagnosis Date Noted POA    PRINCIPAL PROBLEM:  CNS vasculitis failed imuran; on cytoxan [I77.6] 06/02/2017 Yes    MANISH (acute kidney injury) [N17.9] 06/21/2019 Yes    Dementia with behavioral disturbance [F03.91] 04/30/2019 Yes    Osteoporosis with current pathological fracture with routine healing from steroids; compression fx 2017; 4/2019 bisphosphonate [M80.00XD] 04/10/2019 Not Applicable    Impaired functional mobility, balance, gait, and endurance [Z74.09] 06/14/2017 Yes    Type 2 diabetes mellitus with diabetic polyneuropathy, with long-term current use of insulin [E11.42, Z79.4] 05/14/2017 Not Applicable     JOHN with CPAP at night [G47.33] 10/11/2013 Yes    Mixed hyperlipidemia [E78.2] 11/09/2012 Yes    Essential hypertension 5/2016 TTE diastolic dysfunction [I10] 11/09/2012 Yes      Problems Resolved During this Admission:       Discharged Condition: good    Disposition:     Follow Up:    Patient Instructions:      HOSPITAL BED FOR HOME USE     Order Specific Question Answer Comments   Type: Semi-electric    Length of need (1-99 months): 99    Does patient have medical equipment at home? glucometer    Does patient have medical equipment at home? bath bench    Does patient have medical equipment at home? rollator    Height: 5' 11" (1.803 m)    Weight: 94 kg (207 lb 3.7 oz)    Please check all that apply: Patient requires positioning of the body in ways not feasible in an ordinary bed due to a medical condition which is expected to last at least one month.      WALKER FOR HOME USE     Order Specific Question Answer Comments   Type of Walker: " "Adult (5'4"-6'6")    With wheels? Yes    Height: 5' 11" (1.803 m)    Weight: 94 kg (207 lb 3.7 oz)    Length of need (1-99 months): 99    Does patient have medical equipment at home? glucometer    Does patient have medical equipment at home? bath bench    Does patient have medical equipment at home? rollator    Please check all that apply: Patient's condition impairs ambulation.        Significant Diagnostic Studies: Labs:   CMP   Recent Labs   Lab 01/08/20  0644 01/08/20  1544 01/09/20  0611    140 142   K 4.5 3.8 4.1    108 111*   CO2 24 24 23   * 158* 120*   BUN 29* 23 22   CREATININE 1.7* 1.5* 1.3   CALCIUM 9.1 9.3 8.9   PROT 6.7  --  6.5   ALBUMIN 3.7  --  3.5   BILITOT 0.5  --  0.4   ALKPHOS 77  --  75   AST 35  --  32   ALT 32  --  31   ANIONGAP 8 8 8   ESTGFRAFRICA 49.2* 57.3* >60.0   EGFRNONAA 42.6* 49.5* 58.9*    and CBC   Recent Labs   Lab 01/08/20  0644 01/09/20  0611   WBC 5.74 4.69   HGB 10.3* 10.4*   HCT 30.7* 31.7*   * 107*       Pending Diagnostic Studies:     None         Medications:  Reconciled Home Medications:      Medication List      CHANGE how you take these medications    alendronate 70 MG tablet  Commonly known as:  FOSAMAX  Take 1 tablet (70 mg total) by mouth every 7 days.  What changed:  additional instructions     sertraline 100 MG tablet  Commonly known as:  ZOLOFT  1.5 tablet daily  What changed:    · how much to take  · how to take this  · when to take this  · additional instructions        CONTINUE taking these medications    aspirin 81 MG EC tablet  Commonly known as:  ECOTRIN  Take 81 mg by mouth once daily.     atenolol 50 MG tablet  Commonly known as:  TENORMIN  Take 1 tablet (50 mg total) by mouth once daily.     atorvastatin 40 MG tablet  Commonly known as:  LIPITOR  TAKE 1 TABLET BY MOUTH ONCE DAILY     blood sugar diagnostic Strp  Commonly known as:  True Metrix Glucose Test Strip  Test blood sugar four times a day     diltiaZEM 240 MG " "Cdcr  Commonly known as:  DILT-XR  Take 1 capsule (240 mg total) by mouth once daily.     donepezil 5 MG tablet  Commonly known as:  ARICEPT  Take 1 tablet (5 mg total) by mouth every evening.     flash glucose sensor Kit  Commonly known as:  FreeStyle Patrick 14 Day Sensor  Glucose checking 4 times a day     FreeStyle Patrick 14 Day [place] Misc  Generic drug:  flash glucose scanning reader  GLUCOSE TESTING : FASTING AND PRIOR TO MEALS     insulin aspart U-100 100 unit/mL (3 mL) Inpn pen  Commonly known as:  NovoLOG  Inject 1-10 Units into the skin before meals and at bedtime as needed (Hyperglycemia).     insulin glargine 100 units/mL (3mL) SubQ pen  Commonly known as:  Basaglar KwikPen U-100 Insulin  Inject 25 Units into the skin once daily. Up to 60 BID with cytoxan     insulin syringe-needle U-100 0.5 mL 30 gauge x 5/16" Syrg  Commonly known as:  Insulin Syringe  Use 3x/day     irbesartan 300 MG tablet  Commonly known as:  AVAPRO  TAKE 1 TABLET BY MOUTH ONCE DAILY IN THE EVENING     liraglutide 0.6 mg/0.1 mL (18 mg/3 mL) subq PNIJ 0.6 mg/0.1 mL (18 mg/3 mL) Pnij  Commonly known as:  Victoza 3-Matt  Inject 1.8 mg into the skin once daily.     metFORMIN 500 MG 24 hr tablet  Commonly known as:  GLUCOPHAGE-XR  Take 2 tablets (1,000 mg total) by mouth 2 (two) times daily with meals.     methylphenidate HCl 10 MG tablet  Commonly known as:  RITALIN  Take 1 tablet (10 mg total) by mouth 2 (two) times daily with meals.     omega-3 fatty acids-vitamin E 1,000 mg Cap  Commonly known as:  Fish Oil  Take 1 capsule by mouth 2 (two) times daily.     pen needle, diabetic 32 gauge x 5/32" Ndle  Commonly known as:  BD Ultra-Fine Geovanna Pen Needle  4x daily insulin pen needle, relion     QUEtiapine 25 MG Tab  Commonly known as:  SEROQUEL  Take 1 tablet (25 mg total) by mouth every evening.     TRUEplus Lancets 30 gauge Misc  Generic drug:  lancets  Test blood sugar four times a day     vitamin D 1000 units Tab  Commonly known as:  " VITAMIN D3  Take 5 tablets (5,000 Units total) by mouth once daily.            Indwelling Lines/Drains at time of discharge:   Lines/Drains/Airways     Central Venous Catheter Line                 Port A Cath Single Lumen 12/07/18 1219 right internal jugular 398 days                Time spent on the discharge of patient: 60 minutes  Patient was seen and examined on the date of discharge and determined to be suitable for discharge.         Juana Ndiaye DO  Department of Hospital Medicine  [facility]

## 2020-01-09 NOTE — TELEPHONE ENCOUNTER
----- Message from Sotero Mcneill sent at 1/9/2020  2:11 PM CST -----  Contact: Leeanna Ortiz.       The Pt needs a 2 week Hosp F/U appt.    Phone # [phone]

## 2020-01-10 ENCOUNTER — TELEPHONE (OUTPATIENT)
Dept: RHEUMATOLOGY | Facility: CLINIC | Age: 60
End: 2020-01-10

## 2020-01-10 ENCOUNTER — TELEPHONE (OUTPATIENT)
Dept: FAMILY MEDICINE | Facility: CLINIC | Age: 60
End: 2020-01-10

## 2020-01-10 ENCOUNTER — INFUSION (OUTPATIENT)
Dept: INFUSION THERAPY | Facility: HOSPITAL | Age: 60
End: 2020-01-10
Attending: PSYCHIATRY & NEUROLOGY
Payer: MEDICARE

## 2020-01-10 ENCOUNTER — TELEPHONE (OUTPATIENT)
Dept: HOME HEALTH SERVICES | Facility: HOSPITAL | Age: 60
End: 2020-01-10

## 2020-01-10 VITALS
SYSTOLIC BLOOD PRESSURE: 141 MMHG | RESPIRATION RATE: 18 BRPM | HEART RATE: 67 BPM | TEMPERATURE: 98 F | DIASTOLIC BLOOD PRESSURE: 79 MMHG

## 2020-01-10 DIAGNOSIS — I77.6 CNS VASCULITIS: ICD-10-CM

## 2020-01-10 DIAGNOSIS — T45.1X5A CHEMOTHERAPY-INDUCED THROMBOCYTOPENIA: Primary | ICD-10-CM

## 2020-01-10 DIAGNOSIS — D69.59 CHEMOTHERAPY-INDUCED THROMBOCYTOPENIA: Primary | ICD-10-CM

## 2020-01-10 DIAGNOSIS — E11.65 UNCONTROLLED TYPE 2 DIABETES MELLITUS WITH HYPERGLYCEMIA: ICD-10-CM

## 2020-01-10 PROCEDURE — 96375 TX/PRO/DX INJ NEW DRUG ADDON: CPT

## 2020-01-10 PROCEDURE — 63600175 PHARM REV CODE 636 W HCPCS: Mod: JG | Performed by: INTERNAL MEDICINE

## 2020-01-10 PROCEDURE — 96413 CHEMO IV INFUSION 1 HR: CPT

## 2020-01-10 PROCEDURE — 25000003 PHARM REV CODE 250: Performed by: INTERNAL MEDICINE

## 2020-01-10 PROCEDURE — 96415 CHEMO IV INFUSION ADDL HR: CPT

## 2020-01-10 RX ORDER — SODIUM CHLORIDE 0.9 % (FLUSH) 0.9 %
10 SYRINGE (ML) INJECTION
Status: DISCONTINUED | OUTPATIENT
Start: 2020-01-10 | End: 2020-01-10 | Stop reason: HOSPADM

## 2020-01-10 RX ORDER — HEPARIN 100 UNIT/ML
500 SYRINGE INTRAVENOUS
Status: DISCONTINUED | OUTPATIENT
Start: 2020-01-10 | End: 2020-01-10 | Stop reason: HOSPADM

## 2020-01-10 RX ORDER — ACETAMINOPHEN 325 MG/1
650 TABLET ORAL
Status: COMPLETED | OUTPATIENT
Start: 2020-01-10 | End: 2020-01-10

## 2020-01-10 RX ORDER — METHYLPREDNISOLONE SOD SUCC 125 MG
125 VIAL (EA) INJECTION ONCE
Status: CANCELLED | OUTPATIENT
Start: 2020-01-11

## 2020-01-10 RX ORDER — SODIUM CHLORIDE 0.9 % (FLUSH) 0.9 %
10 SYRINGE (ML) INJECTION
Status: CANCELLED | OUTPATIENT
Start: 2020-01-11

## 2020-01-10 RX ORDER — HEPARIN 100 UNIT/ML
500 SYRINGE INTRAVENOUS
Status: CANCELLED | OUTPATIENT
Start: 2020-01-11

## 2020-01-10 RX ORDER — ACETAMINOPHEN 325 MG/1
650 TABLET ORAL
Status: CANCELLED | OUTPATIENT
Start: 2020-01-11

## 2020-01-10 RX ORDER — MEPERIDINE HYDROCHLORIDE 50 MG/ML
25 INJECTION INTRAMUSCULAR; INTRAVENOUS; SUBCUTANEOUS
Status: DISCONTINUED | OUTPATIENT
Start: 2020-01-10 | End: 2020-01-10 | Stop reason: HOSPADM

## 2020-01-10 RX ORDER — METHYLPREDNISOLONE SOD SUCC 125 MG
125 VIAL (EA) INJECTION ONCE
Status: COMPLETED | OUTPATIENT
Start: 2020-01-10 | End: 2020-01-10

## 2020-01-10 RX ORDER — MEPERIDINE HYDROCHLORIDE 50 MG/ML
25 INJECTION INTRAMUSCULAR; INTRAVENOUS; SUBCUTANEOUS
Status: CANCELLED | OUTPATIENT
Start: 2020-01-11

## 2020-01-10 RX ORDER — DIPHENHYDRAMINE HCL 25 MG
25 CAPSULE ORAL
Status: CANCELLED | OUTPATIENT
Start: 2020-01-11

## 2020-01-10 RX ORDER — DIPHENHYDRAMINE HCL 25 MG
25 CAPSULE ORAL
Status: COMPLETED | OUTPATIENT
Start: 2020-01-10 | End: 2020-01-10

## 2020-01-10 RX ORDER — FLASH GLUCOSE SENSOR
KIT MISCELLANEOUS
Qty: 2 KIT | Refills: 11 | Status: SHIPPED | OUTPATIENT
Start: 2020-01-10 | End: 2021-02-01 | Stop reason: SDUPTHER

## 2020-01-10 RX ADMIN — ACETAMINOPHEN 650 MG: 325 TABLET ORAL at 09:01

## 2020-01-10 RX ADMIN — RITUXIMAB 1000 MG: 10 INJECTION, SOLUTION INTRAVENOUS at 10:01

## 2020-01-10 RX ADMIN — SODIUM CHLORIDE: 9 INJECTION, SOLUTION INTRAVENOUS at 09:01

## 2020-01-10 RX ADMIN — DIPHENHYDRAMINE HYDROCHLORIDE 25 MG: 25 CAPSULE ORAL at 09:01

## 2020-01-10 RX ADMIN — HEPARIN 500 UNITS: 100 SYRINGE at 02:01

## 2020-01-10 RX ADMIN — METHYLPREDNISOLONE SODIUM SUCCINATE 125 MG: 125 INJECTION, POWDER, FOR SOLUTION INTRAMUSCULAR; INTRAVENOUS at 09:01

## 2020-01-10 NOTE — TELEPHONE ENCOUNTER
----- Message from Osmel Carter sent at 1/10/2020  3:35 PM CST -----  Contact: Wife- Bekah  Type: Patient Call Back    Who called:Wife    What is the request in detail: He needs to schedule a hospital f/u within one week or by 01/20/2020. He was admitted in the hospital more than once.    Can the clinic reply by [facility]?No    Would the patient rather a call back or a response via [facility]? Call    Best call back number:[phone]    Additional Information:n/a

## 2020-01-10 NOTE — PLAN OF CARE
Pt tolerated Rituxan with no complications. VSS. Pt instructed to call MD with any problems. NAD. Pt discharged home via wheelchair.

## 2020-01-10 NOTE — TELEPHONE ENCOUNTER
"----- Message from Liliane Mast MA sent at 1/10/2020  3:45 PM CST -----  Contact: Bekah Nunez      ----- Message -----  From: Jcarlos Meyers RN  Sent: 1/10/2020   3:11 PM CST  To: Nita RAJAN Staff    Looks like we were helping with care in regards to cytoxan. However, now he is switched to rituxan. Not able to field these questions at this time as he is being followed by Dr. Durant's office currently. I appreciate the help!      ----- Message -----  From: Jessica Gaytan  Sent: 1/10/2020  12:37 PM CST  To: Bryanna Loomis Staff    Consult/Advisory:    Name Of Caller: Bekah   Provider Name: Bryanna Loomis MD   Does patient feel the need to be seen today? No   Relationship to the Pt?:  Contact Preference?: [phone]  What is the nature of the call?:  -- asking about the pts treatment and how much   longer does he have. She is the pts transportation   and accompany him to his appts     Additional Notes:  "Thank you for all that you do for our patients'"        "

## 2020-01-10 NOTE — PLAN OF CARE
Patient discharged home with Om[facility]  set up through [facility].    Future Appointments   Date Time Provider Department Center   1/10/2020  9:30 AM [facility]   1/24/2020  8:00 AM NURSE 6, [facility]   1/30/2020  1:00 PM Edgar Nova MD [facility]   2/4/2020  3:40 PM Shana Love MD [facility] MSC [facility]   2/5/2020 10:00 AM Kay Troy MD [facility] RHEUM [facility]   2/7/2020  1:00 PM DC, VISUAL FIELDS [facility] OPHTHAL [facility]   2/7/2020  1:30 PM Lobito Willson MD [facility] OPHTHAL [facility]        01/10/20 0754   Final Note   Assessment Type Final Discharge Note   Anticipated Discharge Disposition Home-Health   Right Care Referral Info   Post Acute Recommendation Home-care   Referral Type Home Health   Facility Name Replaced by [facility]

## 2020-01-11 PROCEDURE — G0180 PR HOME HEALTH MD CERTIFICATION: ICD-10-PCS | Mod: ,,, | Performed by: INTERNAL MEDICINE

## 2020-01-11 PROCEDURE — G0180 MD CERTIFICATION HHA PATIENT: HCPCS | Mod: ,,, | Performed by: INTERNAL MEDICINE

## 2020-01-13 NOTE — PT/OT/SLP DISCHARGE
Occupational Therapy Discharge Summary    Bessy Nunez  MRN: [id number]   Principal Problem: CNS vasculitis      Patient Discharged from acute Occupational Therapy on 1/10/2020.  Please refer to prior OT note dated 1/9/2020 for functional status.    Assessment:      Goals partially met.    Objective:     GOALS:   Multidisciplinary Problems     Occupational Therapy Goals        Problem: Occupational Therapy Goal    Goal Priority Disciplines Outcome Interventions   Occupational Therapy Goal     OT, PT/OT Ongoing, Progressing    Description:  Goals to be met by: 1/17/19     Patient will increase functional independence with ADLs by performing:      UE Dressing with Set-up Assistance.  LE Dressing with Maximum Assistance.  Grooming while standing with Stand-by Assistance.  Toileting from toilet with Stand-by Assistance for hygiene and clothing management.   Rolling to Bilateral with [place].   Supine to sit with [place].  Step transfer with Stand-by Assistance  Toilet transfer to toilet with Stand-by Assistance.                    Reasons for Discontinuation of Therapy Services  Transfer to alternate level of care.      Plan:     Patient Discharged to: Home with Home Health Service    Althea Ramos OT  1/13/2020

## 2020-01-15 ENCOUNTER — OFFICE VISIT (OUTPATIENT)
Dept: FAMILY MEDICINE | Facility: CLINIC | Age: 60
End: 2020-01-15
Payer: MEDICARE

## 2020-01-15 ENCOUNTER — TELEPHONE (OUTPATIENT)
Dept: FAMILY MEDICINE | Facility: CLINIC | Age: 60
End: 2020-01-15

## 2020-01-15 VITALS
HEART RATE: 84 BPM | HEIGHT: 71 IN | OXYGEN SATURATION: 98 % | WEIGHT: 207 LBS | BODY MASS INDEX: 28.98 KG/M2 | DIASTOLIC BLOOD PRESSURE: 80 MMHG | SYSTOLIC BLOOD PRESSURE: 130 MMHG | TEMPERATURE: 98 F

## 2020-01-15 DIAGNOSIS — S32.040S CLOSED COMPRESSION FRACTURE OF FOURTH LUMBAR VERTEBRA, SEQUELA: ICD-10-CM

## 2020-01-15 DIAGNOSIS — F33.0 MILD EPISODE OF RECURRENT MAJOR DEPRESSIVE DISORDER: ICD-10-CM

## 2020-01-15 DIAGNOSIS — Z79.4 CONTROLLED TYPE 2 DIABETES MELLITUS WITH DIABETIC NEPHROPATHY, WITH LONG-TERM CURRENT USE OF INSULIN: ICD-10-CM

## 2020-01-15 DIAGNOSIS — I10 ESSENTIAL HYPERTENSION: Chronic | ICD-10-CM

## 2020-01-15 DIAGNOSIS — E11.42 TYPE 2 DIABETES MELLITUS WITH DIABETIC POLYNEUROPATHY, WITH LONG-TERM CURRENT USE OF INSULIN: Primary | ICD-10-CM

## 2020-01-15 DIAGNOSIS — M80.80XD OTHER OSTEOPOROSIS WITH CURRENT PATHOLOGICAL FRACTURE WITH ROUTINE HEALING, SUBSEQUENT ENCOUNTER: ICD-10-CM

## 2020-01-15 DIAGNOSIS — N17.9 AKI (ACUTE KIDNEY INJURY): ICD-10-CM

## 2020-01-15 DIAGNOSIS — D84.9 ACQUIRED IMMUNOCOMPROMISED STATE: ICD-10-CM

## 2020-01-15 DIAGNOSIS — I77.6 CNS VASCULITIS: ICD-10-CM

## 2020-01-15 DIAGNOSIS — E11.21 CONTROLLED TYPE 2 DIABETES MELLITUS WITH DIABETIC NEPHROPATHY, WITH LONG-TERM CURRENT USE OF INSULIN: ICD-10-CM

## 2020-01-15 DIAGNOSIS — R53.83 FATIGUE, UNSPECIFIED TYPE: ICD-10-CM

## 2020-01-15 DIAGNOSIS — Z79.4 TYPE 2 DIABETES MELLITUS WITH DIABETIC POLYNEUROPATHY, WITH LONG-TERM CURRENT USE OF INSULIN: Primary | ICD-10-CM

## 2020-01-15 DIAGNOSIS — F01.518 VASCULAR DEMENTIA WITH BEHAVIOR DISTURBANCE: ICD-10-CM

## 2020-01-15 PROBLEM — R65.20 SEVERE SEPSIS: Status: RESOLVED | Noted: 2019-12-03 | Resolved: 2020-01-15

## 2020-01-15 PROBLEM — Z79.630: Status: RESOLVED | Noted: 2018-10-30 | Resolved: 2020-01-15

## 2020-01-15 PROBLEM — A41.9 SEVERE SEPSIS: Status: RESOLVED | Noted: 2019-12-03 | Resolved: 2020-01-15

## 2020-01-15 PROBLEM — R47.01 APHASIA: Status: RESOLVED | Noted: 2017-12-04 | Resolved: 2020-01-15

## 2020-01-15 PROBLEM — A41.9 SEPSIS DUE TO URINARY TRACT INFECTION: Status: RESOLVED | Noted: 2019-12-09 | Resolved: 2020-01-15

## 2020-01-15 PROBLEM — N39.0 SEPSIS DUE TO URINARY TRACT INFECTION: Status: RESOLVED | Noted: 2019-12-09 | Resolved: 2020-01-15

## 2020-01-15 PROBLEM — Z12.11 SCREEN FOR COLON CANCER: Status: RESOLVED | Noted: 2019-05-21 | Resolved: 2020-01-15

## 2020-01-15 PROCEDURE — 99999 PR PBB SHADOW E&M-EST. PATIENT-LVL IV: CPT | Mod: PBBFAC,,, | Performed by: INTERNAL MEDICINE

## 2020-01-15 PROCEDURE — 3075F PR MOST RECENT SYSTOLIC BLOOD PRESS GE 130-139MM HG: ICD-10-PCS | Mod: CPTII,S$GLB,, | Performed by: INTERNAL MEDICINE

## 2020-01-15 PROCEDURE — 3079F PR MOST RECENT DIASTOLIC BLOOD PRESSURE 80-89 MM HG: ICD-10-PCS | Mod: CPTII,S$GLB,, | Performed by: INTERNAL MEDICINE

## 2020-01-15 PROCEDURE — 99999 PR PBB SHADOW E&M-EST. PATIENT-LVL IV: ICD-10-PCS | Mod: PBBFAC,,, | Performed by: INTERNAL MEDICINE

## 2020-01-15 PROCEDURE — 99499 RISK ADDL DX/OHS AUDIT: ICD-10-PCS | Mod: S$GLB,,, | Performed by: INTERNAL MEDICINE

## 2020-01-15 PROCEDURE — 3008F PR BODY MASS INDEX (BMI) DOCUMENTED: ICD-10-PCS | Mod: CPTII,S$GLB,, | Performed by: INTERNAL MEDICINE

## 2020-01-15 PROCEDURE — 99214 OFFICE O/P EST MOD 30 MIN: CPT | Mod: S$GLB,,, | Performed by: INTERNAL MEDICINE

## 2020-01-15 PROCEDURE — 99214 PR OFFICE/OUTPT VISIT, EST, LEVL IV, 30-39 MIN: ICD-10-PCS | Mod: S$GLB,,, | Performed by: INTERNAL MEDICINE

## 2020-01-15 PROCEDURE — 3075F SYST BP GE 130 - 139MM HG: CPT | Mod: CPTII,S$GLB,, | Performed by: INTERNAL MEDICINE

## 2020-01-15 PROCEDURE — 3079F DIAST BP 80-89 MM HG: CPT | Mod: CPTII,S$GLB,, | Performed by: INTERNAL MEDICINE

## 2020-01-15 PROCEDURE — 99499 UNLISTED E&M SERVICE: CPT | Mod: S$GLB,,, | Performed by: INTERNAL MEDICINE

## 2020-01-15 PROCEDURE — 3008F BODY MASS INDEX DOCD: CPT | Mod: CPTII,S$GLB,, | Performed by: INTERNAL MEDICINE

## 2020-01-15 RX ORDER — METHYLPHENIDATE HYDROCHLORIDE 10 MG/1
10 TABLET ORAL 2 TIMES DAILY WITH MEALS
Qty: 60 TABLET | Refills: 0 | Status: SHIPPED | OUTPATIENT
Start: 2020-01-15 | End: 2020-03-13 | Stop reason: SDUPTHER

## 2020-01-15 RX ORDER — ATENOLOL 50 MG/1
50 TABLET ORAL DAILY
Qty: 90 TABLET | Refills: 3 | Status: SHIPPED | OUTPATIENT
Start: 2020-01-15 | End: 2020-08-05 | Stop reason: SDUPTHER

## 2020-01-15 RX ORDER — SYRINGE,SAFETY WITH NEEDLE,1ML 25GX1"
SYRINGE (EA) MISCELLANEOUS
Qty: 300 EACH | Refills: 3 | Status: SHIPPED | OUTPATIENT
Start: 2020-01-15 | End: 2020-04-22

## 2020-01-15 NOTE — TELEPHONE ENCOUNTER
----- Message from Johanna Peoples sent at 1/15/2020  4:32 PM CST -----  Contact: ronn   Name of Who is Calling: ronn with [employer]       What is the request in detail:Per Ronn she is requesting a call back to see what  [facility]osp[facility] the patient  was referred to       Can the clinic reply by [facility]: no      What Number to Call Back if not in [facility]: [phone]

## 2020-01-15 NOTE — PATIENT INSTRUCTIONS
Shoot for morning glucose of     Goal after meals of 160 or less.    No changes in regimen for now

## 2020-01-16 ENCOUNTER — PATIENT OUTREACH (OUTPATIENT)
Dept: ADMINISTRATIVE | Facility: HOSPITAL | Age: 60
End: 2020-01-16

## 2020-01-22 ENCOUNTER — OFFICE VISIT (OUTPATIENT)
Dept: NEUROLOGY | Facility: CLINIC | Age: 60
End: 2020-01-22
Payer: MEDICARE

## 2020-01-22 VITALS
WEIGHT: 208.69 LBS | DIASTOLIC BLOOD PRESSURE: 79 MMHG | BODY MASS INDEX: 29.22 KG/M2 | HEIGHT: 71 IN | SYSTOLIC BLOOD PRESSURE: 121 MMHG | HEART RATE: 84 BPM

## 2020-01-22 DIAGNOSIS — R53.83 FATIGUE, UNSPECIFIED TYPE: ICD-10-CM

## 2020-01-22 DIAGNOSIS — I77.6 CNS VASCULITIS: Primary | ICD-10-CM

## 2020-01-22 PROCEDURE — 3074F SYST BP LT 130 MM HG: CPT | Mod: CPTII,S$GLB,, | Performed by: PSYCHIATRY & NEUROLOGY

## 2020-01-22 PROCEDURE — 99999 PR PBB SHADOW E&M-EST. PATIENT-LVL IV: ICD-10-PCS | Mod: PBBFAC,,, | Performed by: PSYCHIATRY & NEUROLOGY

## 2020-01-22 PROCEDURE — 99999 PR PBB SHADOW E&M-EST. PATIENT-LVL IV: CPT | Mod: PBBFAC,,, | Performed by: PSYCHIATRY & NEUROLOGY

## 2020-01-22 PROCEDURE — 3008F PR BODY MASS INDEX (BMI) DOCUMENTED: ICD-10-PCS | Mod: CPTII,S$GLB,, | Performed by: PSYCHIATRY & NEUROLOGY

## 2020-01-22 PROCEDURE — 99215 PR OFFICE/OUTPT VISIT, EST, LEVL V, 40-54 MIN: ICD-10-PCS | Mod: S$GLB,,, | Performed by: PSYCHIATRY & NEUROLOGY

## 2020-01-22 PROCEDURE — 99215 OFFICE O/P EST HI 40 MIN: CPT | Mod: S$GLB,,, | Performed by: PSYCHIATRY & NEUROLOGY

## 2020-01-22 PROCEDURE — 3078F PR MOST RECENT DIASTOLIC BLOOD PRESSURE < 80 MM HG: ICD-10-PCS | Mod: CPTII,S$GLB,, | Performed by: PSYCHIATRY & NEUROLOGY

## 2020-01-22 PROCEDURE — 3078F DIAST BP <80 MM HG: CPT | Mod: CPTII,S$GLB,, | Performed by: PSYCHIATRY & NEUROLOGY

## 2020-01-22 PROCEDURE — 99499 RISK ADDL DX/OHS AUDIT: ICD-10-PCS | Mod: S$GLB,,, | Performed by: PSYCHIATRY & NEUROLOGY

## 2020-01-22 PROCEDURE — 99499 UNLISTED E&M SERVICE: CPT | Mod: S$GLB,,, | Performed by: PSYCHIATRY & NEUROLOGY

## 2020-01-22 PROCEDURE — 3074F PR MOST RECENT SYSTOLIC BLOOD PRESSURE < 130 MM HG: ICD-10-PCS | Mod: CPTII,S$GLB,, | Performed by: PSYCHIATRY & NEUROLOGY

## 2020-01-22 PROCEDURE — 3008F BODY MASS INDEX DOCD: CPT | Mod: CPTII,S$GLB,, | Performed by: PSYCHIATRY & NEUROLOGY

## 2020-01-22 NOTE — Clinical Note
Michael VILLEGAS Bessy has become more disabled cognitively; his wife is totally overwhelmed and depressed, and now wants to place him in NH.  Would you mind giving her a call to support her in navigating this process?  Thanks, B

## 2020-01-24 ENCOUNTER — EXTERNAL HOME HEALTH (OUTPATIENT)
Dept: HOME HEALTH SERVICES | Facility: HOSPITAL | Age: 60
End: 2020-01-24
Payer: MEDICARE

## 2020-01-24 ENCOUNTER — INFUSION (OUTPATIENT)
Dept: INFUSION THERAPY | Facility: HOSPITAL | Age: 60
End: 2020-01-24
Attending: PSYCHIATRY & NEUROLOGY
Payer: MEDICARE

## 2020-01-24 VITALS
RESPIRATION RATE: 18 BRPM | DIASTOLIC BLOOD PRESSURE: 63 MMHG | SYSTOLIC BLOOD PRESSURE: 111 MMHG | TEMPERATURE: 98 F | HEART RATE: 97 BPM

## 2020-01-24 DIAGNOSIS — I77.6 CNS VASCULITIS: ICD-10-CM

## 2020-01-24 DIAGNOSIS — D69.59 CHEMOTHERAPY-INDUCED THROMBOCYTOPENIA: Primary | ICD-10-CM

## 2020-01-24 DIAGNOSIS — T45.1X5A CHEMOTHERAPY-INDUCED THROMBOCYTOPENIA: Primary | ICD-10-CM

## 2020-01-24 PROCEDURE — A4216 STERILE WATER/SALINE, 10 ML: HCPCS | Performed by: INTERNAL MEDICINE

## 2020-01-24 PROCEDURE — 63600175 PHARM REV CODE 636 W HCPCS: Mod: JG | Performed by: INTERNAL MEDICINE

## 2020-01-24 PROCEDURE — 96415 CHEMO IV INFUSION ADDL HR: CPT

## 2020-01-24 PROCEDURE — 96413 CHEMO IV INFUSION 1 HR: CPT

## 2020-01-24 PROCEDURE — 96375 TX/PRO/DX INJ NEW DRUG ADDON: CPT

## 2020-01-24 PROCEDURE — 96376 TX/PRO/DX INJ SAME DRUG ADON: CPT

## 2020-01-24 PROCEDURE — 25000003 PHARM REV CODE 250: Performed by: INTERNAL MEDICINE

## 2020-01-24 RX ORDER — SODIUM CHLORIDE 0.9 % (FLUSH) 0.9 %
10 SYRINGE (ML) INJECTION
Status: CANCELLED | OUTPATIENT
Start: 2020-01-25

## 2020-01-24 RX ORDER — ACETAMINOPHEN 325 MG/1
650 TABLET ORAL
Status: CANCELLED | OUTPATIENT
Start: 2020-01-25

## 2020-01-24 RX ORDER — SODIUM CHLORIDE 0.9 % (FLUSH) 0.9 %
10 SYRINGE (ML) INJECTION
Status: DISCONTINUED | OUTPATIENT
Start: 2020-01-24 | End: 2020-01-24 | Stop reason: HOSPADM

## 2020-01-24 RX ORDER — HEPARIN 100 UNIT/ML
500 SYRINGE INTRAVENOUS
Status: CANCELLED | OUTPATIENT
Start: 2020-01-25

## 2020-01-24 RX ORDER — HEPARIN 100 UNIT/ML
500 SYRINGE INTRAVENOUS
Status: DISCONTINUED | OUTPATIENT
Start: 2020-01-24 | End: 2020-01-24 | Stop reason: HOSPADM

## 2020-01-24 RX ORDER — METHYLPREDNISOLONE SOD SUCC 125 MG
125 VIAL (EA) INJECTION ONCE
Status: CANCELLED | OUTPATIENT
Start: 2020-01-25

## 2020-01-24 RX ORDER — DIPHENHYDRAMINE HCL 25 MG
25 CAPSULE ORAL
Status: COMPLETED | OUTPATIENT
Start: 2020-01-24 | End: 2020-01-24

## 2020-01-24 RX ORDER — MEPERIDINE HYDROCHLORIDE 50 MG/ML
25 INJECTION INTRAMUSCULAR; INTRAVENOUS; SUBCUTANEOUS
Status: CANCELLED | OUTPATIENT
Start: 2020-01-25

## 2020-01-24 RX ORDER — METHYLPREDNISOLONE SOD SUCC 125 MG
125 VIAL (EA) INJECTION ONCE
Status: COMPLETED | OUTPATIENT
Start: 2020-01-24 | End: 2020-01-24

## 2020-01-24 RX ORDER — ACETAMINOPHEN 325 MG/1
650 TABLET ORAL
Status: COMPLETED | OUTPATIENT
Start: 2020-01-24 | End: 2020-01-24

## 2020-01-24 RX ORDER — DIPHENHYDRAMINE HCL 25 MG
25 CAPSULE ORAL
Status: CANCELLED | OUTPATIENT
Start: 2020-01-25

## 2020-01-24 RX ORDER — MEPERIDINE HYDROCHLORIDE 50 MG/ML
25 INJECTION INTRAMUSCULAR; INTRAVENOUS; SUBCUTANEOUS
Status: DISCONTINUED | OUTPATIENT
Start: 2020-01-24 | End: 2020-01-24 | Stop reason: HOSPADM

## 2020-01-24 RX ADMIN — ACETAMINOPHEN 650 MG: 325 TABLET ORAL at 08:01

## 2020-01-24 RX ADMIN — Medication 10 ML: at 12:01

## 2020-01-24 RX ADMIN — DIPHENHYDRAMINE HYDROCHLORIDE 25 MG: 25 CAPSULE ORAL at 08:01

## 2020-01-24 RX ADMIN — METHYLPREDNISOLONE SODIUM SUCCINATE 125 MG: 125 INJECTION, POWDER, FOR SOLUTION INTRAMUSCULAR; INTRAVENOUS at 08:01

## 2020-01-24 RX ADMIN — HEPARIN 500 UNITS: 100 SYRINGE at 12:01

## 2020-01-24 RX ADMIN — RITUXIMAB 1000 MG: 10 INJECTION, SOLUTION INTRAVENOUS at 09:01

## 2020-01-24 RX ADMIN — SODIUM CHLORIDE: 0.9 INJECTION, SOLUTION INTRAVENOUS at 08:01

## 2020-01-24 NOTE — PLAN OF CARE
1250 patient completed and tolerated treatment well, pt voiced no new complaints or concerns at this time. VSS. AVS given to patient, pt d/c home, NAD.

## 2020-01-26 NOTE — PROGRESS NOTES
Subjective:       Patient ID: Bessy Nunez is a 59 y.o. male who presents today for a routine clinic visit for CNS vasculitis  HPI:  · DMT: Rituximab on 1/10/2020 (first infusion); Second infusion is scheduled for 1/24/2020  · Side effects from DMT? No  · Taking vitamin D3 as recommended? Yes - Dose: 5000units daily  · Wife is concerned that he is sleeping about 20 hours per day but she states he has been out of the Ritalin 10mg bid. He has not been on this since most recent discharge from hospital on 1/9/2020. PCP did refill it on 1/15 but wife has not picked it up.   · Wife does feel that his memory is better since starting Rituximab. His appetite is better since Rituximab. Walking is better since the Rituximab but he is also undergoing physical therapy. He is no longer using a walker. No falls or stumples since Rituximab.    · Wife noticed when he was used CPAP in hospital, he was more alert the next day. He does have diagnosis of JOHN but does not use CPAP.   · She estimates that he is 50% - 90% better since getting Rituxan; second dose Rituxan 1/24/2020;   · Wife overall is very exhausted and interested in getting assistance at home or placement in a facility.    SOCIAL HISTORY  Social History     Tobacco Use    Smoking status: Never Smoker    Smokeless tobacco: Never Used   Substance Use Topics    Alcohol use: No     Alcohol/week: 0.0 standard drinks     Frequency: Never     Drinks per session: 1 or 2     Binge frequency: Never    Drug use: No     Living arrangements - the patient lives with their spouse.  Employment none  ROS:  · Fatigue: Yes -   · Sleep Disturbance: Yes -   · Bladder Dysfunction: No  · Bowel Dysfunction: No  · Spasticity: No  · Visual Symptoms: No  · Cognitive: Yes -   · Mood Disorder: No  · Gait Disturbance: Yes -   · Falls: No  · Hand Dysfunction: No  · Pain: No  · Sexual Dysfunction: Not Assessed  · Skin Breakdown: No  · Tremors: No  · Dysphagia:  No  · Dysarthria:  No  · Heat  sensitivity:  No  · Any un-met adaptive needs? No  · Copay Assist?  No  · Clinical Trial candidate? No            Objective:        In general, the patient is well nourished.    No bruits. Fundi are normal bilaterally.    MENTAL STATUS: docile affect; does not participate in history;  language is generally fluent, delayed recall 2/3, patient is alert and oriented person, and place but not date. He states month was December and year 2020.     CRANIAL NERVE EXAM:  There is no JAYDEN.  Extraocular muscles are intact. Pupils are equal, round, and reactive to light. No facial asymmetry. Facial sensation is intact bilaterally. There is no dysarthria. Uvula is midline, and palate moves symmetrically. Shoulder shrug intact bilaterlly. Tongue protrusion is midline. Hearing is grossly intact. Neck is supple.     MOTOR EXAM: Normal bulk and tone throughout UE and LE bilaterally.   No pronator drift; rapid sequential movements are normal; Strength is  5/5 in all groups in the lower extremities and upper extremities;    REFLEXES: 2+ and symmetric throughout in all four extremeties; toes are down bilaterally    GAIT:  decrease arm swing bilaterally    Imaging:     Results for orders placed during the hospital encounter of 04/15/19   MRI Brain Demyelinating W W/O Contrast    Impression Remote microvascular ischemic changes supratentorial and infratentorial with areas suggesting amyloid angiopathy.    Incidental enhancing tiny venous angioma right parietal lobe.    findings in the cerebral white matter which are not typical for multiple sclerosis.  No new or enhancing lesions to suggest active disease.      Electronically signed by: Wing Galo MD  Date:    04/15/2019  Time:    11:32         Labs:     Lab Results   Component Value Date    AVMWOFDW59BT 47 03/04/2019    XBGSMYTI54SI 36 08/15/2018    QUUXMSPJ94BW 11 (L) 05/17/2017     No results found for: JCVINDEX, JCVANTIBODY  Lab Results   Component Value Date    WH3DXBDU 87.9  (H) 08/15/2018    ABSOLUTECD3 1271 08/15/2018    MV4SSPKY 22.6 08/15/2018    ABSOLUTECD8 327 08/15/2018    RQ8DOLPQ 61.7 (H) 08/15/2018    ABSOLUTECD4 892 08/15/2018    LABCD48 2.73 08/15/2018     Lab Results   Component Value Date    WBC 4.69 01/09/2020    RBC 3.64 (L) 01/09/2020    HGB 10.4 (L) 01/09/2020    HCT 31.7 (L) 01/09/2020    MCV 87 01/09/2020    MCH 28.6 01/09/2020    MCHC 32.8 01/09/2020    RDW 13.4 01/09/2020     (L) 01/09/2020    MPV 9.7 01/09/2020    GRAN 2.8 01/09/2020    GRAN 60.3 01/09/2020    LYMPH 1.2 01/09/2020    LYMPH 25.6 01/09/2020    MONO 0.5 01/09/2020    MONO 10.7 01/09/2020    EOS 0.1 01/09/2020    BASO 0.02 01/09/2020    EOSINOPHIL 2.8 01/09/2020    BASOPHIL 0.4 01/09/2020     Sodium   Date Value Ref Range Status   01/09/2020 142 136 - 145 mmol/L Final     Potassium   Date Value Ref Range Status   01/09/2020 4.1 3.5 - 5.1 mmol/L Final     Chloride   Date Value Ref Range Status   01/09/2020 111 (H) 95 - 110 mmol/L Final     CO2   Date Value Ref Range Status   01/09/2020 23 23 - 29 mmol/L Final     Glucose   Date Value Ref Range Status   01/09/2020 120 (H) 70 - 110 mg/dL Final     BUN, Bld   Date Value Ref Range Status   01/09/2020 22 8 - 23 mg/dL Final     Creatinine   Date Value Ref Range Status   01/09/2020 1.3 0.5 - 1.4 mg/dL Final     Calcium   Date Value Ref Range Status   01/09/2020 8.9 8.7 - 10.5 mg/dL Final     Total Protein   Date Value Ref Range Status   01/09/2020 6.5 6.0 - 8.4 g/dL Final     Albumin   Date Value Ref Range Status   01/09/2020 3.5 3.5 - 5.2 g/dL Final   10/11/2013 4.3 3.6 - 5.1 g/dL Final     Comment:     @ Test Performed By:  [email or web address]  Cici Estrada M.D., [address]   [address] [place] [address]  [facility] #[id number]     Total Bilirubin   Date Value Ref Range Status   01/09/2020 0.4 0.1 - 1.0 mg/dL Final     Comment:     For infants and newborns, interpretation of results should be based  on  gestational age, weight and in agreement with clinical  observations.  Premature Infant recommended reference ranges:  Up to 24 hours.............<8.0 mg/dL  Up to 48 hours............<12.0 mg/dL  3-5 days..................<15.0 mg/dL  6-29 days.................<15.0 mg/dL       Alkaline Phosphatase   Date Value Ref Range Status   01/09/2020 75 55 - 135 U/L Final     AST   Date Value Ref Range Status   01/09/2020 32 10 - 40 U/L Final     ALT   Date Value Ref Range Status   01/09/2020 31 10 - 44 U/L Final     Anion Gap   Date Value Ref Range Status   01/09/2020 8 8 - 16 mmol/L Final     eGFR if    Date Value Ref Range Status   01/09/2020 >60.0 >60 mL/min/1.73 m^2 Final     eGFR if non    Date Value Ref Range Status   01/09/2020 58.9 (A) >60 mL/min/1.73 m^2 Final     Comment:     Calculation used to obtain the estimated glomerular filtration  rate (eGFR) is the CKD-EPI equation.                    Diagnosis/Assessment/Plan:    1. CNS Vasculitis  · Assessment: The patient has recently been started on Rituximab and will get the second dose this week. The wife notes that he is 50-90% better after receiving the first dose. Overall, his walking and memory has improved but the wife is interested in getting assistance at home or placement in a facility. Appreciate Rheumatology Assistance.   · Disease Modifying Therapies:Rituximab; previously treated with cytoxan and Imuran; continue vitamin D    2. CNS Symptom Assessment / Management  · Fatigue: continue ritalin; patient has JOHN but not using CPAP. I suggested to follow-up with PCP for treatment of sleep apnea.   · Cognitive impairment: wife is interested in assistance at home or placement; referral to         Problem List Items Addressed This Visit        Immunology/Multi System    CNS vasculitis      Other Visit Diagnoses     Fatigue, unspecified type              Uli Carrera MD  Neuroimmunology/MS Fellow  [facility]  [place]    I have personally seen and examined the patient today along with Dr. Carrera , and agree with assessment and recommendations.      Shana Love MD  [facility]

## 2020-01-28 ENCOUNTER — TELEPHONE (OUTPATIENT)
Dept: PSYCHIATRY | Facility: CLINIC | Age: 60
End: 2020-01-28

## 2020-01-28 NOTE — TELEPHONE ENCOUNTER
JULIAN received request from JENS Love MD to call pt's wife about her interest in looking into a nursing home for pt.  Bekah says she spoke with pt's son, Marky, and since that time pt has been doing better.  He has home health and some new medical equipment, but she fears he will regress again.  Bekah shared that the HH company gave her a list of private duty caregivers and her friend also recommended a company that provides aides.  JULIAN encouraged Bekah to make this a priority with her son.  Family is reluctant to pursue nursing home placement 2/2 costs.  Finally, she explained that Marky scheduled an appointment for Bekah to see her psychiatrist because she is overwhelmed.  JULIAN will call back next week to check in.

## 2020-01-31 ENCOUNTER — TELEPHONE (OUTPATIENT)
Dept: PSYCHIATRY | Facility: CLINIC | Age: 60
End: 2020-01-31

## 2020-01-31 NOTE — TELEPHONE ENCOUNTER
"SW phoned pt's wife to discuss nursing home placement.  She is considering a short-term SNF-level rehab placement, now.  She prefers [facility], but admissions coordinator advised that wait list is long.  JULIAN will investigate [facility] in-network SNFs and follow up with Bekah.    ----- Message from Jose Wong sent at 1/30/2020  8:43 AM CST -----  Contact: Mrs. Nunez @ [phone]  Mrs. Nunez is asking to speak w/ you about placing  "somewhere". Mrs. Nunez would not confirm what this meant.    "

## 2020-01-31 NOTE — TELEPHONE ENCOUNTER
Regarding possible SNF rehab admission; SW emailed pt's wife a list of [facility] in-network SNFs.  Will follow up Monday.

## 2020-02-03 ENCOUNTER — TELEPHONE (OUTPATIENT)
Dept: PSYCHIATRY | Facility: CLINIC | Age: 60
End: 2020-02-03

## 2020-02-03 NOTE — TELEPHONE ENCOUNTER
SW attempted follow up with pt's wife re: nursing home choices.  She was tired and said she needed more time to look over the list of options.  Awaiting her return call.

## 2020-02-05 ENCOUNTER — OFFICE VISIT (OUTPATIENT)
Dept: RHEUMATOLOGY | Facility: CLINIC | Age: 60
End: 2020-02-05
Payer: MEDICARE

## 2020-02-05 VITALS
BODY MASS INDEX: 30.15 KG/M2 | HEIGHT: 71 IN | DIASTOLIC BLOOD PRESSURE: 70 MMHG | SYSTOLIC BLOOD PRESSURE: 106 MMHG | HEART RATE: 94 BPM | WEIGHT: 215.38 LBS

## 2020-02-05 DIAGNOSIS — I77.6 CNS VASCULITIS: Primary | ICD-10-CM

## 2020-02-05 DIAGNOSIS — D84.9 IMMUNOSUPPRESSION: ICD-10-CM

## 2020-02-05 PROCEDURE — 3008F PR BODY MASS INDEX (BMI) DOCUMENTED: ICD-10-PCS | Mod: CPTII,S$GLB,, | Performed by: INTERNAL MEDICINE

## 2020-02-05 PROCEDURE — 3078F DIAST BP <80 MM HG: CPT | Mod: CPTII,S$GLB,, | Performed by: INTERNAL MEDICINE

## 2020-02-05 PROCEDURE — 99215 OFFICE O/P EST HI 40 MIN: CPT | Mod: S$GLB,,, | Performed by: INTERNAL MEDICINE

## 2020-02-05 PROCEDURE — 3074F PR MOST RECENT SYSTOLIC BLOOD PRESSURE < 130 MM HG: ICD-10-PCS | Mod: CPTII,S$GLB,, | Performed by: INTERNAL MEDICINE

## 2020-02-05 PROCEDURE — 99999 PR PBB SHADOW E&M-EST. PATIENT-LVL IV: ICD-10-PCS | Mod: PBBFAC,,, | Performed by: INTERNAL MEDICINE

## 2020-02-05 PROCEDURE — 3074F SYST BP LT 130 MM HG: CPT | Mod: CPTII,S$GLB,, | Performed by: INTERNAL MEDICINE

## 2020-02-05 PROCEDURE — 3078F PR MOST RECENT DIASTOLIC BLOOD PRESSURE < 80 MM HG: ICD-10-PCS | Mod: CPTII,S$GLB,, | Performed by: INTERNAL MEDICINE

## 2020-02-05 PROCEDURE — 99215 PR OFFICE/OUTPT VISIT, EST, LEVL V, 40-54 MIN: ICD-10-PCS | Mod: S$GLB,,, | Performed by: INTERNAL MEDICINE

## 2020-02-05 PROCEDURE — 99999 PR PBB SHADOW E&M-EST. PATIENT-LVL IV: CPT | Mod: PBBFAC,,, | Performed by: INTERNAL MEDICINE

## 2020-02-05 PROCEDURE — 3008F BODY MASS INDEX DOCD: CPT | Mod: CPTII,S$GLB,, | Performed by: INTERNAL MEDICINE

## 2020-02-05 ASSESSMENT — ROUTINE ASSESSMENT OF PATIENT INDEX DATA (RAPID3)
MDHAQ FUNCTION SCORE: 1.6
PSYCHOLOGICAL DISTRESS SCORE: 3.3
PAIN SCORE: 6.5
AM STIFFNESS SCORE: 1, YES
WHEN YOU AWAKENED IN THE MORNING OVER THE LAST WEEK, PLEASE INDICATE THE AMOUNT OF TIME IT TAKES UNTIL YOU ARE AS LIMBER AS YOU WILL BE FOR THE DAY: 1 HOUR
PATIENT GLOBAL ASSESSMENT SCORE: 5
FATIGUE SCORE: 9
TOTAL RAPID3 SCORE: 5.61

## 2020-02-05 NOTE — PROGRESS NOTES
Subjective:       Patient ID: Bessy Nunez is a 59 y.o. male.    Chief Complaint: CNS Vasculitis    HPI:  Bessy Nunez is a 59 y.o. 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  Treated with Cytoxan monthly since 2017.  After Cytoxan his head is cleared for 2-3 weeks and he falls left.    Dr. Urias is concerned about toxicity of chemotherapy.    Ritalin recently started by Dr. Love has helped to clear his brain up.      Due for Cytoxan 11/13/19 and wife feels that he is having more activity than usual now based on shuffling gait, almost fell and did not put his hands up, he has been hiding his medicine.      Ankles swell if he sits up a great deal.        Note per heme/onc NP Robina:  Mr. Nunez presents to clinic today with his wife for Cytoxan infusion for CNS Vasculitis. This will be dose #28. Cytoxan was stopped in 2018, and neurologic symptoms worsened so cytoxan was resumed. Since resuming Cytoxan, neurologic symptoms have improved. He notes profound chronic fatigue, but states energy levels improved right after receiving cytoxan. Other than moderate cytopenais mid cycles, pt is tolerating this well. Has upcoming rheumatology appt to discuss alternative therapies in light of ongoing concerns to prolonged high dose alkylator exposure. This appt is scheduled for 11/4/19, so he is planned to proceed with next cycle today. He missed his lab appt yesterday due to fatigue, so he was scheduled for labs today instead. Will will proceed with therapy today and schedule him for labs in 2 weeks and another tentative appt in 4 weeks for another cycle. Have informed pt and his spouse to cancel if rheum changes his therapy.        Interval History:  Patient received two infusions of Rituxan (1/10/2020 and 1/24/2020).  Wife feels improvement since Rituxan of 50% or more.  No falls in 2 months.       Review of Systems   Constitutional: Positive for fatigue.   HENT: Negative for drooling.    Eyes:  "Negative.    Respiratory: Negative.    Cardiovascular: Negative.  Negative for chest pain.   Gastrointestinal: Positive for diarrhea.   Endocrine: Negative.    Genitourinary: Negative.    Musculoskeletal: Positive for arthralgias and neck pain (intermittent). Negative for back pain and gait problem.   Skin: Negative.    Neurological: Positive for headaches.   Psychiatric/Behavioral: Decreased concentration:                    Objective:   /70 (BP Location: Left arm, Patient Position: Sitting, BP Method: Large (Automatic))   Pulse 94   Ht 5' 10.5" (1.791 m)   Wt 97.7 kg (215 lb 6.2 oz)   BMI 30.47 kg/m²      Physical Exam   Constitutional: He is oriented to person, place, and time and well-developed, well-nourished, and in no distress.   HENT:   Head: Normocephalic and atraumatic.   Eyes: Conjunctivae and EOM are normal.   Neck: Neck supple.   Cardiovascular: Normal rate, regular rhythm and normal heart sounds.    Pulmonary/Chest: Effort normal and breath sounds normal.   Abdominal: Soft. Bowel sounds are normal.   Neurological: He is alert and oriented to person, place, and time.   Sitting in wheelchair   Skin: Skin is warm and dry.     Psychiatric: Mood and affect normal.   Musculoskeletal: He exhibits no edema, tenderness or deformity.              LABS    Component      Latest Ref Rng & Units 10/16/2019   WBC      3.90 - 12.70 K/uL 7.14   RBC      4.60 - 6.20 M/uL 3.96 (L)   Hemoglobin      14.0 - 18.0 g/dL 11.3 (L)   Hematocrit      40.0 - 54.0 % 33.9 (L)   MCV      82 - 98 fL 86   MCH      27.0 - 31.0 pg 28.5   MCHC      32.0 - 36.0 g/dL 33.3   RDW      11.5 - 14.5 % 15.1 (H)   Platelets      150 - 350 K/uL 140 (L)   MPV      9.2 - 12.9 fL 9.3   Immature Granulocytes      0.0 - 0.5 % 0.4   Gran # (ANC)      1.8 - 7.7 K/uL 4.9   Immature Grans (Abs)      0.00 - 0.04 K/uL 0.03   Lymph #      1.0 - 4.8 K/uL 1.3   Mono #      0.3 - 1.0 K/uL 0.7   Eos #      0.0 - 0.5 K/uL 0.1   Baso #      0.00 - 0.20 " K/uL 0.03   nRBC      0 /100 WBC 0   Gran%      38.0 - 73.0 % 68.8   Lymph%      18.0 - 48.0 % 18.3   Mono%      4.0 - 15.0 % 10.4   Eosinophil%      0.0 - 8.0 % 1.7   Basophil%      0.0 - 1.9 % 0.4   Differential Method       Automated   Sodium      136 - 145 mmol/L 146 (H)   Potassium      3.5 - 5.1 mmol/L 4.3   Chloride      95 - 110 mmol/L 109   CO2      23 - 29 mmol/L 26   Glucose      70 - 110 mg/dL 145 (H)   BUN, Bld      8 - 23 mg/dL 13   Creatinine      0.5 - 1.4 mg/dL 1.3   Calcium      8.7 - 10.5 mg/dL 9.9   PROTEIN TOTAL      6.0 - 8.4 g/dL 7.5   Albumin      3.5 - 5.2 g/dL 4.2   BILIRUBIN TOTAL      0.1 - 1.0 mg/dL 0.8   Alkaline Phosphatase      55 - 135 U/L 82   AST      10 - 40 U/L 21   ALT      10 - 44 U/L 16   Anion Gap      8 - 16 mmol/L 11   eGFR if African American      >60 mL/min/1.73 m:2 >60.0   eGFR if non African American      >60 mL/min/1.73 m:2 58.9 (A)   Group & Rh       O POS   INDIRECT ALBERTO       NEG     Assessment:       1.  CNS vasculitis  2.  Immunosupprsession.  TPMT normal metabolizer in 8/2018 but failed per chart.    3.  Diabetes  4.  Compliance.  He hides his medicine some times  Plan:       1.  Labs  2.  Continue Rituximab   3.  Message to neurology to determine if next infusion every 16 weeks or more often  4. Follow with psychology to deal with stress of disease.      RTO 3 months/prn

## 2020-02-05 NOTE — Clinical Note
Mr. Nunez was in and seems to have done well with Rituxan.  We normal repeat every 16 weeks.  Do you think it should be done on a different schedule?

## 2020-02-06 ENCOUNTER — PATIENT MESSAGE (OUTPATIENT)
Dept: RHEUMATOLOGY | Facility: CLINIC | Age: 60
End: 2020-02-06

## 2020-02-07 ENCOUNTER — CLINICAL SUPPORT (OUTPATIENT)
Dept: OPHTHALMOLOGY | Facility: CLINIC | Age: 60
End: 2020-02-07
Payer: MEDICARE

## 2020-02-07 ENCOUNTER — OFFICE VISIT (OUTPATIENT)
Dept: OPHTHALMOLOGY | Facility: CLINIC | Age: 60
End: 2020-02-07
Payer: MEDICARE

## 2020-02-07 DIAGNOSIS — H53.411 CENTRAL SCOTOMA, RIGHT EYE: Primary | ICD-10-CM

## 2020-02-07 DIAGNOSIS — H53.432 ARCUATE SCOTOMA OF LEFT EYE: ICD-10-CM

## 2020-02-07 LAB
LEFT EYE DM RETINOPATHY: NEGATIVE
RIGHT EYE DM RETINOPATHY: NEGATIVE

## 2020-02-07 PROCEDURE — 92014 COMPRE OPH EXAM EST PT 1/>: CPT | Mod: S$GLB,,, | Performed by: OPHTHALMOLOGY

## 2020-02-07 PROCEDURE — 99999 PR PBB SHADOW E&M-EST. PATIENT-LVL II: ICD-10-PCS | Mod: PBBFAC,,, | Performed by: OPHTHALMOLOGY

## 2020-02-07 PROCEDURE — 92083 HUMPHREY VISUAL FIELD - OU - BOTH EYES: ICD-10-PCS | Mod: S$GLB,,, | Performed by: OPHTHALMOLOGY

## 2020-02-07 PROCEDURE — 92083 EXTENDED VISUAL FIELD XM: CPT | Mod: S$GLB,,, | Performed by: OPHTHALMOLOGY

## 2020-02-07 PROCEDURE — 92014 PR EYE EXAM, EST PATIENT,COMPREHESV: ICD-10-PCS | Mod: S$GLB,,, | Performed by: OPHTHALMOLOGY

## 2020-02-07 PROCEDURE — 99999 PR PBB SHADOW E&M-EST. PATIENT-LVL II: CPT | Mod: PBBFAC,,, | Performed by: OPHTHALMOLOGY

## 2020-02-07 NOTE — PROGRESS NOTES
HPI     Rohit /Oral patient    Pt has h/o cerebral vasculitis and left subthalamic stroke and JAYDEN  Patient has been hospitalized twice spending 21 days in the hospital each   time.    I have personally interviewed the patient, reviewed the history and   examined the patient and agree with the technician's exam.    Last edited by Lobito Willson MD on 2/7/2020  2:07 PM. (History)            Assessment /Plan     For exam results, see Encounter Report.    Central scotoma, right eye  -     Duarte Visual Field - OU - Extended - Both Eyes    Arcuate scotoma of left eye      Mr. Nunez's JAYDEN resolved. His visual field defects are stable. I will repeat his exam and visual field ttesting in one year.

## 2020-02-11 ENCOUNTER — TELEPHONE (OUTPATIENT)
Dept: PSYCHIATRY | Facility: CLINIC | Age: 60
End: 2020-02-11

## 2020-02-12 NOTE — TELEPHONE ENCOUNTER
SW received 2 appointment requests to help get pt into a nursing home and to assist pt's wife in finding psychological care.  JULIAN also spoke with pt's wife, Bekah, this morning regarding these requests and is focusing first on getting pt referred to nursing home for rehab.  Pt's wife prefers [facility].  JULIAN will coordinate with  agency [facility] and will need to submit authorization request to [facility].

## 2020-02-13 ENCOUNTER — TELEPHONE (OUTPATIENT)
Dept: RHEUMATOLOGY | Facility: CLINIC | Age: 60
End: 2020-02-13

## 2020-02-13 NOTE — TELEPHONE ENCOUNTER
----- Message from Liliane Mast MA sent at 2/12/2020 12:27 PM CST -----  Contact: DIANA Goodman - [facility]       ----- Message -----  From: Sophia Silva  Sent: 2/12/2020  12:17 PM CST  To: Nita RAJAN Staff    RN states she sent a message on yesterday in regards to the Rituxan medication Need to know if pt has 6 mos or less on medication as well as needing a plan of care ask for a call    Contact info  [phone] Phone

## 2020-02-13 NOTE — TELEPHONE ENCOUNTER
JULIAN phoned [facility] [phone] to speak with pt's PT, Jordy Moreno, about SNF rehab, per wife's request.  Awaiting return call. Phoned pt's wife to follow up re: SNF rehab.    Jordy moise [facility] called back and was supportive of SNF admission.  He will fax PT notes.

## 2020-02-13 NOTE — TELEPHONE ENCOUNTER
Spoke with Maxine ORTIZ from hospice who wants to know if the patient has a life expectancy of 6 months or more.  I informed her that prognosis would be better obtained from Neurology.  I will inform Neurology that she may be contacting them.

## 2020-02-14 ENCOUNTER — TELEPHONE (OUTPATIENT)
Dept: HOME HEALTH SERVICES | Facility: HOSPITAL | Age: 60
End: 2020-02-14

## 2020-02-17 ENCOUNTER — EXTERNAL HOME HEALTH (OUTPATIENT)
Dept: HOME HEALTH SERVICES | Facility: HOSPITAL | Age: 60
End: 2020-02-17
Payer: MEDICARE

## 2020-02-17 ENCOUNTER — EXTERNAL HOME HEALTH (OUTPATIENT)
Dept: HOME HEALTH SERVICES | Facility: HOSPITAL | Age: 60
End: 2020-02-17

## 2020-02-20 NOTE — TELEPHONE ENCOUNTER
SW faxed SNF referral to [facility], attn: Anaid.  F: [phone], P: [phone].  Attempted to call pt's wife multiple times with this update but seems as though phone may be having service problems.  Called pt's son, Marky, with update and asked that he have his mother call.

## 2020-02-21 ENCOUNTER — TELEPHONE (OUTPATIENT)
Dept: PSYCHIATRY | Facility: CLINIC | Age: 60
End: 2020-02-21

## 2020-02-21 NOTE — TELEPHONE ENCOUNTER
Spoke with pt's wife about referral to [facility].  Spent some time providing support and active listening by phone as wife is very overwhelmed reports increased alcohol use.  Encouraged wife to reconnect with her own psychiatrist and to reschedule pt with MORAIMA Leiva in [facility] as he hasn't been seen there in over 1 year.

## 2020-03-02 ENCOUNTER — TELEPHONE (OUTPATIENT)
Dept: RHEUMATOLOGY | Facility: CLINIC | Age: 60
End: 2020-03-02

## 2020-03-02 ENCOUNTER — TELEPHONE (OUTPATIENT)
Dept: PSYCHIATRY | Facility: CLINIC | Age: 60
End: 2020-03-02

## 2020-03-02 NOTE — TELEPHONE ENCOUNTER
SW received voicemail from [facility] advising pt was denied admission for rehab.  Communicated this with pt's wife, today.

## 2020-03-08 PROCEDURE — 99285 EMERGENCY DEPT VISIT HI MDM: CPT | Mod: ,,, | Performed by: EMERGENCY MEDICINE

## 2020-03-08 PROCEDURE — P9612 CATHETERIZE FOR URINE SPEC: HCPCS

## 2020-03-08 PROCEDURE — 99285 PR EMERGENCY DEPT VISIT,LEVEL V: ICD-10-PCS | Mod: ,,, | Performed by: EMERGENCY MEDICINE

## 2020-03-08 PROCEDURE — 99285 EMERGENCY DEPT VISIT HI MDM: CPT | Mod: 25

## 2020-03-09 ENCOUNTER — HOSPITAL ENCOUNTER (OUTPATIENT)
Facility: HOSPITAL | Age: 60
Discharge: HOME | End: 2020-03-09
Attending: EMERGENCY MEDICINE | Admitting: HOSPITALIST
Payer: MEDICARE

## 2020-03-09 VITALS
HEART RATE: 68 BPM | HEIGHT: 70 IN | BODY MASS INDEX: 29.35 KG/M2 | RESPIRATION RATE: 18 BRPM | SYSTOLIC BLOOD PRESSURE: 128 MMHG | OXYGEN SATURATION: 98 % | DIASTOLIC BLOOD PRESSURE: 61 MMHG | WEIGHT: 205 LBS | TEMPERATURE: 98 F

## 2020-03-09 DIAGNOSIS — N17.9 AKI (ACUTE KIDNEY INJURY): ICD-10-CM

## 2020-03-09 DIAGNOSIS — R53.1 GENERALIZED WEAKNESS: ICD-10-CM

## 2020-03-09 DIAGNOSIS — F03.91 DEMENTIA WITH BEHAVIORAL DISTURBANCE, UNSPECIFIED DEMENTIA TYPE: Chronic | ICD-10-CM

## 2020-03-09 DIAGNOSIS — E11.42 TYPE 2 DIABETES MELLITUS WITH DIABETIC POLYNEUROPATHY, WITH LONG-TERM CURRENT USE OF INSULIN: Chronic | ICD-10-CM

## 2020-03-09 DIAGNOSIS — B37.89 CANDIDA RASH OF GROIN: ICD-10-CM

## 2020-03-09 DIAGNOSIS — Z79.4 TYPE 2 DIABETES MELLITUS WITH DIABETIC POLYNEUROPATHY, WITH LONG-TERM CURRENT USE OF INSULIN: Chronic | ICD-10-CM

## 2020-03-09 DIAGNOSIS — R79.89 ELEVATED TROPONIN: Primary | ICD-10-CM
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
## 2020-03-09 DIAGNOSIS — I10 ESSENTIAL HYPERTENSION: Chronic | ICD-10-CM

## 2020-03-09 PROBLEM — F03.918 DEMENTIA WITH BEHAVIORAL DISTURBANCE: Chronic | Status: ACTIVE | Noted: 2019-04-30

## 2020-03-09 LAB
ALBUMIN SERPL BCP-MCNC: 3.4 G/DL (ref 3.5–5.2)
ALBUMIN SERPL BCP-MCNC: 3.9 G/DL (ref 3.5–5.2)
ALP SERPL-CCNC: 75 U/L (ref 55–135)
ALP SERPL-CCNC: 86 U/L (ref 55–135)
ALT SERPL W/O P-5'-P-CCNC: 13 U/L (ref 10–44)
ALT SERPL W/O P-5'-P-CCNC: 15 U/L (ref 10–44)
ANION GAP SERPL CALC-SCNC: 10 MMOL/L (ref 8–16)
ANION GAP SERPL CALC-SCNC: 12 MMOL/L (ref 8–16)
ASCENDING AORTA: 3.74 CM
AST SERPL-CCNC: 21 U/L (ref 10–40)
AST SERPL-CCNC: 25 U/L (ref 10–40)
AV INDEX (PROSTH): 0.52
AV MEAN GRADIENT: 5 MMHG
AV PEAK GRADIENT: 10 MMHG
AV VALVE AREA: 2.48 CM2
AV VELOCITY RATIO: 0.49
BACTERIA #/AREA URNS AUTO: ABNORMAL /HPF
BASOPHILS # BLD AUTO: 0.02 K/UL (ref 0–0.2)
BASOPHILS # BLD AUTO: 0.02 K/UL (ref 0–0.2)
BASOPHILS NFR BLD: 0.3 % (ref 0–1.9)
BASOPHILS NFR BLD: 0.3 % (ref 0–1.9)
BILIRUB SERPL-MCNC: 0.7 MG/DL (ref 0.1–1)
BILIRUB SERPL-MCNC: 1 MG/DL (ref 0.1–1)
BILIRUB UR QL STRIP: NEGATIVE
BNP SERPL-MCNC: 36 PG/ML (ref 0–99)
BSA FOR ECHO PROCEDURE: 2.14 M2
BUN SERPL-MCNC: 28 MG/DL (ref 8–23)
BUN SERPL-MCNC: 30 MG/DL (ref 8–23)
CALCIUM SERPL-MCNC: 8.4 MG/DL (ref 8.7–10.5)
CALCIUM SERPL-MCNC: 9.8 MG/DL (ref 8.7–10.5)
CHLORIDE SERPL-SCNC: 105 MMOL/L (ref 95–110)
CHLORIDE SERPL-SCNC: 108 MMOL/L (ref 95–110)
CLARITY UR REFRACT.AUTO: ABNORMAL
CO2 SERPL-SCNC: 21 MMOL/L (ref 23–29)
CO2 SERPL-SCNC: 25 MMOL/L (ref 23–29)
COLOR UR AUTO: YELLOW
CREAT SERPL-MCNC: 1.5 MG/DL (ref 0.5–1.4)
CREAT SERPL-MCNC: 1.9 MG/DL (ref 0.5–1.4)
CV ECHO LV RWT: 0.53 CM
DIFFERENTIAL METHOD: ABNORMAL
DIFFERENTIAL METHOD: ABNORMAL
DOP CALC AO PEAK VEL: 1.61 M/S
DOP CALC AO VTI: 31.2 CM
DOP CALC LVOT AREA: 4.8 CM2
DOP CALC LVOT DIAMETER: 2.46 CM
DOP CALC LVOT PEAK VEL: 0.79 M/S
DOP CALC LVOT STROKE VOLUME: 77.48 CM3
DOP CALCLVOT PEAK VEL VTI: 16.31 CM
E WAVE DECELERATION TIME: 210.26 MSEC
E/A RATIO: 0.78
E/E' RATIO: 9.86 M/S
ECHO LV POSTERIOR WALL: 1.28 CM (ref 0.6–1.1)
EOSINOPHIL # BLD AUTO: 0.1 K/UL (ref 0–0.5)
EOSINOPHIL # BLD AUTO: 0.1 K/UL (ref 0–0.5)
EOSINOPHIL NFR BLD: 1.2 % (ref 0–8)
EOSINOPHIL NFR BLD: 1.6 % (ref 0–8)
ERYTHROCYTE [DISTWIDTH] IN BLOOD BY AUTOMATED COUNT: 14.6 % (ref 11.5–14.5)
ERYTHROCYTE [DISTWIDTH] IN BLOOD BY AUTOMATED COUNT: 14.7 % (ref 11.5–14.5)
EST. GFR  (AFRICAN AMERICAN): 42.7 ML/MIN/1.73 M^2
EST. GFR  (AFRICAN AMERICAN): 56.9 ML/MIN/1.73 M^2
EST. GFR  (NON AFRICAN AMERICAN): 37 ML/MIN/1.73 M^2
EST. GFR  (NON AFRICAN AMERICAN): 49.2 ML/MIN/1.73 M^2
ESTIMATED AVG GLUCOSE: 128 MG/DL (ref 68–131)
FRACTIONAL SHORTENING: 30 % (ref 28–44)
GLUCOSE SERPL-MCNC: 101 MG/DL (ref 70–110)
GLUCOSE SERPL-MCNC: 132 MG/DL (ref 70–110)
GLUCOSE UR QL STRIP: NEGATIVE
HBA1C MFR BLD HPLC: 6.1 % (ref 4–5.6)
HCT VFR BLD AUTO: 31.4 % (ref 40–54)
HCT VFR BLD AUTO: 36 % (ref 40–54)
HGB BLD-MCNC: 10.1 G/DL (ref 14–18)
HGB BLD-MCNC: 11.9 G/DL (ref 14–18)
HGB UR QL STRIP: NEGATIVE
HYALINE CASTS UR QL AUTO: 3 /LPF
IMM GRANULOCYTES # BLD AUTO: 0.01 K/UL (ref 0–0.04)
IMM GRANULOCYTES # BLD AUTO: 0.01 K/UL (ref 0–0.04)
IMM GRANULOCYTES NFR BLD AUTO: 0.1 % (ref 0–0.5)
IMM GRANULOCYTES NFR BLD AUTO: 0.1 % (ref 0–0.5)
INTERVENTRICULAR SEPTUM: 1.1 CM (ref 0.6–1.1)
IVRT: 102.76 MSEC
KETONES UR QL STRIP: NEGATIVE
LA MAJOR: 6.3 CM
LA MINOR: 6.27 CM
LA WIDTH: 4.87 CM
LEFT ATRIUM SIZE: 3.98 CM
LEFT ATRIUM VOLUME INDEX: 49.1 ML/M2
LEFT ATRIUM VOLUME: 103.55 CM3
LEFT INTERNAL DIMENSION IN SYSTOLE: 3.37 CM (ref 2.1–4)
LEFT VENTRICLE DIASTOLIC VOLUME INDEX: 50.9 ML/M2
LEFT VENTRICLE DIASTOLIC VOLUME: 107.37 ML
LEFT VENTRICLE MASS INDEX: 103 G/M2
LEFT VENTRICLE SYSTOLIC VOLUME INDEX: 22.1 ML/M2
LEFT VENTRICLE SYSTOLIC VOLUME: 46.59 ML
LEFT VENTRICULAR INTERNAL DIMENSION IN DIASTOLE: 4.8 CM (ref 3.5–6)
LEFT VENTRICULAR MASS: 216.55 G
LEUKOCYTE ESTERASE UR QL STRIP: NEGATIVE
LV LATERAL E/E' RATIO: 9.86 M/S
LV SEPTAL E/E' RATIO: 9.86 M/S
LYMPHOCYTES # BLD AUTO: 0.8 K/UL (ref 1–4.8)
LYMPHOCYTES # BLD AUTO: 1 K/UL (ref 1–4.8)
LYMPHOCYTES NFR BLD: 12.2 % (ref 18–48)
LYMPHOCYTES NFR BLD: 13.6 % (ref 18–48)
MAGNESIUM SERPL-MCNC: 1.6 MG/DL (ref 1.6–2.6)
MCH RBC QN AUTO: 28.5 PG (ref 27–31)
MCH RBC QN AUTO: 29.3 PG (ref 27–31)
MCHC RBC AUTO-ENTMCNC: 32.2 G/DL (ref 32–36)
MCHC RBC AUTO-ENTMCNC: 33.1 G/DL (ref 32–36)
MCV RBC AUTO: 89 FL (ref 82–98)
MCV RBC AUTO: 89 FL (ref 82–98)
MICROSCOPIC COMMENT: ABNORMAL
MONOCYTES # BLD AUTO: 0.7 K/UL (ref 0.3–1)
MONOCYTES # BLD AUTO: 0.8 K/UL (ref 0.3–1)
MONOCYTES NFR BLD: 11.3 % (ref 4–15)
MONOCYTES NFR BLD: 9.7 % (ref 4–15)
MV PEAK A VEL: 0.88 M/S
MV PEAK E VEL: 0.69 M/S
NEUTROPHILS # BLD AUTO: 5.1 K/UL (ref 1.8–7.7)
NEUTROPHILS # BLD AUTO: 5.1 K/UL (ref 1.8–7.7)
NEUTROPHILS NFR BLD: 73.1 % (ref 38–73)
NEUTROPHILS NFR BLD: 76.5 % (ref 38–73)
NITRITE UR QL STRIP: NEGATIVE
NRBC BLD-RTO: 0 /100 WBC
NRBC BLD-RTO: 0 /100 WBC
PH UR STRIP: 5 [PH] (ref 5–8)
PHOSPHATE SERPL-MCNC: 4.1 MG/DL (ref 2.7–4.5)
PISA TR MAX VEL: 2.08 M/S
PLATELET # BLD AUTO: 133 K/UL (ref 150–350)
PLATELET # BLD AUTO: 152 K/UL (ref 150–350)
PMV BLD AUTO: 9.6 FL (ref 9.2–12.9)
PMV BLD AUTO: 9.9 FL (ref 9.2–12.9)
POCT GLUCOSE: 100 MG/DL (ref 70–110)
POCT GLUCOSE: 101 MG/DL (ref 70–110)
POCT GLUCOSE: 126 MG/DL (ref 70–110)
POCT GLUCOSE: 135 MG/DL (ref 70–110)
POCT GLUCOSE: 153 MG/DL (ref 70–110)
POTASSIUM SERPL-SCNC: 3.4 MMOL/L (ref 3.5–5.1)
POTASSIUM SERPL-SCNC: 3.6 MMOL/L (ref 3.5–5.1)
PROT SERPL-MCNC: 6.1 G/DL (ref 6–8.4)
PROT SERPL-MCNC: 7.2 G/DL (ref 6–8.4)
PROT UR QL STRIP: ABNORMAL
PULM VEIN S/D RATIO: 1.28
PV PEAK D VEL: 0.29 M/S
PV PEAK S VEL: 0.37 M/S
RA MAJOR: 5.06 CM
RA PRESSURE: 3 MMHG
RA WIDTH: 3.82 CM
RBC # BLD AUTO: 3.54 M/UL (ref 4.6–6.2)
RBC # BLD AUTO: 4.06 M/UL (ref 4.6–6.2)
RBC #/AREA URNS AUTO: 0 /HPF (ref 0–4)
RIGHT VENTRICULAR END-DIASTOLIC DIMENSION: 3.7 CM
RV TISSUE DOPPLER FREE WALL SYSTOLIC VELOCITY 1 (APICAL 4 CHAMBER VIEW): 10.4 CM/S
SINUS: 4.02 CM
SODIUM SERPL-SCNC: 139 MMOL/L (ref 136–145)
SODIUM SERPL-SCNC: 142 MMOL/L (ref 136–145)
SP GR UR STRIP: 1.02 (ref 1–1.03)
SQUAMOUS #/AREA URNS AUTO: 0 /HPF
STJ: 3.25 CM
TDI LATERAL: 0.07 M/S
TDI SEPTAL: 0.07 M/S
TDI: 0.07 M/S
TR MAX PG: 17 MMHG
TRICUSPID ANNULAR PLANE SYSTOLIC EXCURSION: 2.05 CM
TROPONIN I SERPL DL<=0.01 NG/ML-MCNC: 0.02 NG/ML (ref 0–0.03)
TROPONIN I SERPL DL<=0.01 NG/ML-MCNC: 0.03 NG/ML (ref 0–0.03)
TROPONIN I SERPL DL<=0.01 NG/ML-MCNC: 0.04 NG/ML (ref 0–0.03)
TV REST PULMONARY ARTERY PRESSURE: 20 MMHG
URN SPEC COLLECT METH UR: ABNORMAL
WBC # BLD AUTO: 6.71 K/UL (ref 3.9–12.7)
WBC # BLD AUTO: 6.97 K/UL (ref 3.9–12.7)
WBC #/AREA URNS AUTO: 2 /HPF (ref 0–5)

## 2020-03-09 PROCEDURE — 84100 ASSAY OF PHOSPHORUS: CPT

## 2020-03-09 PROCEDURE — 81001 URINALYSIS AUTO W/SCOPE: CPT

## 2020-03-09 PROCEDURE — 80053 COMPREHEN METABOLIC PANEL: CPT

## 2020-03-09 PROCEDURE — 80053 COMPREHEN METABOLIC PANEL: CPT | Mod: 91

## 2020-03-09 PROCEDURE — 96361 HYDRATE IV INFUSION ADD-ON: CPT

## 2020-03-09 PROCEDURE — G0378 HOSPITAL OBSERVATION PER HR: HCPCS

## 2020-03-09 PROCEDURE — 85025 COMPLETE CBC W/AUTO DIFF WBC: CPT

## 2020-03-09 PROCEDURE — 99220 PR INITIAL OBSERVATION CARE,LEVL III: ICD-10-PCS | Mod: ,,, | Performed by: PHYSICIAN ASSISTANT

## 2020-03-09 PROCEDURE — 96372 THER/PROPH/DIAG INJ SC/IM: CPT | Mod: 59

## 2020-03-09 PROCEDURE — 97802 MEDICAL NUTRITION INDIV IN: CPT | Mod: 59

## 2020-03-09 PROCEDURE — 25000003 PHARM REV CODE 250: Performed by: PHYSICIAN ASSISTANT

## 2020-03-09 PROCEDURE — 83036 HEMOGLOBIN GLYCOSYLATED A1C: CPT

## 2020-03-09 PROCEDURE — 63600175 PHARM REV CODE 636 W HCPCS: Performed by: PHYSICIAN ASSISTANT

## 2020-03-09 PROCEDURE — 63600175 PHARM REV CODE 636 W HCPCS: Performed by: EMERGENCY MEDICINE

## 2020-03-09 PROCEDURE — 84484 ASSAY OF TROPONIN QUANT: CPT

## 2020-03-09 PROCEDURE — 97530 THERAPEUTIC ACTIVITIES: CPT

## 2020-03-09 PROCEDURE — 84484 ASSAY OF TROPONIN QUANT: CPT | Mod: 91

## 2020-03-09 PROCEDURE — 99220 PR INITIAL OBSERVATION CARE,LEVL III: CPT | Mod: ,,, | Performed by: PHYSICIAN ASSISTANT

## 2020-03-09 PROCEDURE — 97165 OT EVAL LOW COMPLEX 30 MIN: CPT

## 2020-03-09 PROCEDURE — 82962 GLUCOSE BLOOD TEST: CPT

## 2020-03-09 PROCEDURE — 96360 HYDRATION IV INFUSION INIT: CPT

## 2020-03-09 PROCEDURE — 94761 N-INVAS EAR/PLS OXIMETRY MLT: CPT

## 2020-03-09 PROCEDURE — 97161 PT EVAL LOW COMPLEX 20 MIN: CPT

## 2020-03-09 PROCEDURE — 83735 ASSAY OF MAGNESIUM: CPT

## 2020-03-09 PROCEDURE — 36415 COLL VENOUS BLD VENIPUNCTURE: CPT

## 2020-03-09 PROCEDURE — 83880 ASSAY OF NATRIURETIC PEPTIDE: CPT

## 2020-03-09 RX ORDER — TALC
6 POWDER (GRAM) TOPICAL NIGHTLY PRN
Status: DISCONTINUED | OUTPATIENT
Start: 2020-03-09 | End: 2020-03-09 | Stop reason: HOSPADM

## 2020-03-09 RX ORDER — ASPIRIN 81 MG/1
81 TABLET ORAL DAILY
Status: DISCONTINUED | OUTPATIENT
Start: 2020-03-09 | End: 2020-03-09 | Stop reason: HOSPADM

## 2020-03-09 RX ORDER — SODIUM CHLORIDE 0.9 % (FLUSH) 0.9 %
5 SYRINGE (ML) INJECTION
Status: DISCONTINUED | OUTPATIENT
Start: 2020-03-09 | End: 2020-03-09 | Stop reason: HOSPADM

## 2020-03-09 RX ORDER — DONEPEZIL HYDROCHLORIDE 5 MG/1
5 TABLET, FILM COATED ORAL NIGHTLY
Status: DISCONTINUED | OUTPATIENT
Start: 2020-03-09 | End: 2020-03-09 | Stop reason: HOSPADM

## 2020-03-09 RX ORDER — AMOXICILLIN 250 MG
1 CAPSULE ORAL 2 TIMES DAILY
Status: DISCONTINUED | OUTPATIENT
Start: 2020-03-09 | End: 2020-03-09 | Stop reason: HOSPADM

## 2020-03-09 RX ORDER — ACETAMINOPHEN 325 MG/1
650 TABLET ORAL EVERY 4 HOURS PRN
Status: DISCONTINUED | OUTPATIENT
Start: 2020-03-09 | End: 2020-03-09 | Stop reason: HOSPADM

## 2020-03-09 RX ORDER — SODIUM CHLORIDE 0.9 % (FLUSH) 0.9 %
10 SYRINGE (ML) INJECTION
Status: DISCONTINUED | OUTPATIENT
Start: 2020-03-09 | End: 2020-03-09 | Stop reason: HOSPADM

## 2020-03-09 RX ORDER — PROMETHAZINE HYDROCHLORIDE 25 MG/1
25 TABLET ORAL EVERY 6 HOURS PRN
Status: DISCONTINUED | OUTPATIENT
Start: 2020-03-09 | End: 2020-03-09 | Stop reason: HOSPADM

## 2020-03-09 RX ORDER — GLUCAGON 1 MG
1 KIT INJECTION
Status: DISCONTINUED | OUTPATIENT
Start: 2020-03-09 | End: 2020-03-09 | Stop reason: HOSPADM

## 2020-03-09 RX ORDER — IRBESARTAN 150 MG/1
300 TABLET ORAL NIGHTLY
Status: DISCONTINUED | OUTPATIENT
Start: 2020-03-09 | End: 2020-03-09

## 2020-03-09 RX ORDER — INSULIN ASPART 100 [IU]/ML
1-10 INJECTION, SOLUTION INTRAVENOUS; SUBCUTANEOUS EVERY 6 HOURS PRN
Status: DISCONTINUED | OUTPATIENT
Start: 2020-03-09 | End: 2020-03-09

## 2020-03-09 RX ORDER — DILTIAZEM HYDROCHLORIDE 240 MG/1
240 CAPSULE, COATED, EXTENDED RELEASE ORAL DAILY
Status: DISCONTINUED | OUTPATIENT
Start: 2020-03-09 | End: 2020-03-09 | Stop reason: HOSPADM

## 2020-03-09 RX ORDER — HEPARIN SODIUM 5000 [USP'U]/ML
5000 INJECTION, SOLUTION INTRAVENOUS; SUBCUTANEOUS EVERY 8 HOURS
Status: DISCONTINUED | OUTPATIENT
Start: 2020-03-09 | End: 2020-03-09 | Stop reason: HOSPADM

## 2020-03-09 RX ORDER — HYDRALAZINE HYDROCHLORIDE 25 MG/1
50 TABLET, FILM COATED ORAL EVERY 6 HOURS PRN
Status: DISCONTINUED | OUTPATIENT
Start: 2020-03-09 | End: 2020-03-09 | Stop reason: HOSPADM

## 2020-03-09 RX ORDER — SODIUM CHLORIDE 9 MG/ML
1000 INJECTION, SOLUTION INTRAVENOUS ONCE
Status: COMPLETED | OUTPATIENT
Start: 2020-03-09 | End: 2020-03-09

## 2020-03-09 RX ORDER — IPRATROPIUM BROMIDE AND ALBUTEROL SULFATE 2.5; .5 MG/3ML; MG/3ML
3 SOLUTION RESPIRATORY (INHALATION) EVERY 4 HOURS PRN
Status: DISCONTINUED | OUTPATIENT
Start: 2020-03-09 | End: 2020-03-09 | Stop reason: HOSPADM

## 2020-03-09 RX ORDER — QUETIAPINE FUMARATE 25 MG/1
25 TABLET, FILM COATED ORAL NIGHTLY
Status: DISCONTINUED | OUTPATIENT
Start: 2020-03-09 | End: 2020-03-09 | Stop reason: HOSPADM

## 2020-03-09 RX ORDER — ATORVASTATIN CALCIUM 20 MG/1
40 TABLET, FILM COATED ORAL DAILY
Status: DISCONTINUED | OUTPATIENT
Start: 2020-03-09 | End: 2020-03-09 | Stop reason: HOSPADM

## 2020-03-09 RX ORDER — INSULIN ASPART 100 [IU]/ML
1-10 INJECTION, SOLUTION INTRAVENOUS; SUBCUTANEOUS
Status: DISCONTINUED | OUTPATIENT
Start: 2020-03-09 | End: 2020-03-09 | Stop reason: HOSPADM

## 2020-03-09 RX ORDER — ONDANSETRON 8 MG/1
8 TABLET, ORALLY DISINTEGRATING ORAL EVERY 8 HOURS PRN
Status: DISCONTINUED | OUTPATIENT
Start: 2020-03-09 | End: 2020-03-09 | Stop reason: HOSPADM

## 2020-03-09 RX ADMIN — ATORVASTATIN CALCIUM 40 MG: 20 TABLET, FILM COATED ORAL at 10:03

## 2020-03-09 RX ADMIN — SODIUM CHLORIDE 1000 ML: 0.9 INJECTION, SOLUTION INTRAVENOUS at 12:03

## 2020-03-09 RX ADMIN — ACETAMINOPHEN 650 MG: 325 TABLET ORAL at 08:03

## 2020-03-09 RX ADMIN — SODIUM CHLORIDE 1000 ML: 0.9 INJECTION, SOLUTION INTRAVENOUS at 06:03

## 2020-03-09 RX ADMIN — HEPARIN SODIUM 5000 UNITS: 5000 INJECTION, SOLUTION INTRAVENOUS; SUBCUTANEOUS at 06:03

## 2020-03-09 RX ADMIN — ASPIRIN 81 MG: 81 TABLET, COATED ORAL at 08:03

## 2020-03-09 RX ADMIN — SERTRALINE HYDROCHLORIDE 150 MG: 100 TABLET ORAL at 08:03

## 2020-03-09 RX ADMIN — DILTIAZEM HYDROCHLORIDE 240 MG: 240 CAPSULE, COATED, EXTENDED RELEASE ORAL at 08:03

## 2020-03-09 RX ADMIN — INSULIN ASPART 2 UNITS: 100 INJECTION, SOLUTION INTRAVENOUS; SUBCUTANEOUS at 12:03

## 2020-03-09 NOTE — PLAN OF CARE
[facility]    HOME HEALTH ORDERS  FACE TO FACE ENCOUNTER    Patient Name: Bessy Nunez  YOB: 1960    PCP: Edgar Nova MD   PCP Address: [address] / [place] [address]  PCP Phone Number: [phone]  PCP Fax: [phone]    Encounter Date: 03/09/2020    Admit to Home Health    Diagnoses:  Active Hospital Problems    Diagnosis  POA    *Elevated troponin [R79.89]  Yes    MANISH (acute kidney injury) [N17.9]  Yes    Dementia with behavioral disturbance [F03.91]  Yes     Chronic    Type 2 diabetes mellitus with diabetic polyneuropathy, with long-term current use of insulin [E11.42, Z79.4]  Not Applicable     Chronic    Mixed hyperlipidemia [E78.2]  Yes     Chronic    Essential hypertension [I10]  Yes     Chronic     5/11/2016 TTE:   1 - Mildly depressed left ventricular systolic function (EF 45-50%).  Mild global hypokinesis at rest. 2 - Eccentric hypertrophy. 3 - Left ventricular diastolic dysfunction.        Resolved Hospital Problems   No resolved problems to display.       Future Appointments   Date Time Provider Department Center   4/24/2020  1:30 PM GABRIEL Somers, CNS [facility]   5/14/2020 11:30 AM Kay Troy MD [facility] [address]     Follow-up Information     Schedule an appointment as soon as possible for a visit with Edgar Nova MD.    Specialty:  Internal Medicine  Contact information:  [address] [id number] [phone]             Kay Troy MD.    Specialty:  Rheumatology  Why:  clinic notified that sooner appointment is needed  Contact information:  [address]  [facility] [address]  [phone]                     I have seen and examined this patient face to face today. My clinical findings that support the need for the home health skilled services and home bound status are the following:  Weakness/numbness causing balance and gait disturbance due to Weakness/Debility making it taxing to  leave home.    Allergies:Review of patient's allergies indicates:  No Known Allergies    Diet: cardiac diet and diabetic diet: 2000 calorie    Activities: activity as tolerated    Nursing:   SN to complete comprehensive assessment including routine vital signs. Instruct on disease process and s/s of complications to report to MD. Review/verify medication list sent home with the patient at time of discharge  and instruct patient/caregiver as needed. Frequency may be adjusted depending on start of care date.    Notify MD if SBP > 160 or < 90; DBP > 90 or < 50; HR > 120 or < 50; Temp > 101; Other:         CONSULTS:    Physical Therapy to evaluate and treat. Evaluate for home safety and equipment needs; Establish/upgrade home exercise program. Perform / instruct on therapeutic exercises, gait training, transfer training, and Range of Motion.  Occupational Therapy to evaluate and treat. Evaluate home environment for safety and equipment needs. Perform/Instruct on transfers, ADL training, ROM, and therapeutic exercises.    MISCELLANEOUS CARE:  N/A    WOUND CARE ORDERS  n/a      Medications: Review discharge medications with patient and family and provide education.      Current Discharge Medication List      CONTINUE these medications which have NOT CHANGED    Details   alendronate (FOSAMAX) 70 MG tablet Take 1 tablet (70 mg total) by mouth every 7 days.  Qty: 4 tablet, Refills: 11    Associated Diagnoses: Other osteoporosis with current pathological fracture with routine healing, subsequent encounter      aspirin (ECOTRIN) 81 MG EC tablet Take 81 mg by mouth once daily.      atenolol (TENORMIN) 50 MG tablet Take 1 tablet (50 mg total) by mouth once daily.  Qty: 90 tablet, Refills: 3    Associated Diagnoses: Essential hypertension      atorvastatin (LIPITOR) 40 MG tablet TAKE 1 TABLET BY MOUTH ONCE DAILY  Qty: 90 tablet, Refills: 3    Associated Diagnoses: Mixed hyperlipidemia      blood sugar diagnostic (TRUE METRIX  "GLUCOSE TEST STRIP) Strp Test blood sugar four times a day  Qty: 400 each, Refills: 11    Associated Diagnoses: Controlled type 2 diabetes mellitus with diabetic nephropathy, with long-term current use of insulin      diltiaZEM (DILT-XR) 240 MG CDCR Take 1 capsule (240 mg total) by mouth once daily.  Qty: 90 capsule, Refills: 3    Associated Diagnoses: Essential hypertension      donepezil (ARICEPT) 5 MG tablet Take 1 tablet (5 mg total) by mouth every evening.  Qty: 90 tablet, Refills: 3    Associated Diagnoses: Cognitive impairment      FREESTYLE ARELY 14 DAY READER Misc GLUCOSE TESTING : FASTING AND PRIOR TO MEALS  Qty: 1 each, Refills: 0    Comments: Please consider 90 day supplies to promote better adherence  Associated Diagnoses: Uncontrolled type 2 diabetes mellitus with hyperglycemia      FREESTYLE ARELY 14 DAY SENSOR Kit GLUCOSE TESTING 4 TIMES A DAY  Qty: 2 kit, Refills: 11    Associated Diagnoses: Uncontrolled type 2 diabetes mellitus with hyperglycemia      insulin (BASAGLAR KWIKPEN U-100 INSULIN) glargine 100 units/mL (3mL) SubQ pen Inject 25 Units into the skin once daily. Up to 60 BID with cytoxan  Qty: 2 Box, Refills: 11    Associated Diagnoses: Type 2 diabetes mellitus with hyperglycemia, with long-term current use of insulin; Controlled type 2 diabetes mellitus with diabetic nephropathy, with long-term current use of insulin      insulin aspart U-100 (NOVOLOG) 100 unit/mL (3 mL) InPn pen Inject 1-10 Units into the skin before meals and at bedtime as needed (Hyperglycemia).  Qty: 3 mL, Refills: 0      insulin syringe-needle U-100 (INSULIN SYRINGE) 0.5 mL 30 gauge x 5/16" Syrg Use 3x/day  Qty: 300 each, Refills: 3    Associated Diagnoses: Type 2 diabetes mellitus with diabetic polyneuropathy, with long-term current use of insulin      irbesartan (AVAPRO) 300 MG tablet TAKE 1 TABLET BY MOUTH ONCE DAILY IN THE EVENING  Qty: 90 tablet, Refills: 3    Associated Diagnoses: Essential hypertension    " "  lancets 30 gauge Misc Test blood sugar four times a day  Qty: 100 each, Refills: 11      liraglutide 0.6 mg/0.1 mL, 18 mg/3 mL, subq PNIJ (VICTOZA 3-TOMASZ) 0.6 mg/0.1 mL (18 mg/3 mL) PnIj Inject 1.8 mg into the skin once daily.  Qty: 9 mL, Refills: 11    Associated Diagnoses: Type 2 diabetes mellitus with hyperglycemia, with long-term current use of insulin; Controlled type 2 diabetes mellitus with diabetic nephropathy, with long-term current use of insulin      metFORMIN (GLUCOPHAGE-XR) 500 MG 24 hr tablet Take 2 tablets (1,000 mg total) by mouth 2 (two) times daily with meals.  Qty: 360 tablet, Refills: 3    Associated Diagnoses: Type 2 diabetes mellitus with hyperglycemia, with long-term current use of insulin; Controlled type 2 diabetes mellitus with diabetic nephropathy, with long-term current use of insulin      methylphenidate HCl (RITALIN) 10 MG tablet Take 1 tablet (10 mg total) by mouth 2 (two) times daily with meals.  Qty: 60 tablet, Refills: 0    Associated Diagnoses: Fatigue, unspecified type      omega-3 fatty acids-vitamin E (FISH OIL) 1,000 mg Cap Take 1 capsule by mouth 2 (two) times daily.  Refills: 0    Associated Diagnoses: Hypertriglyceridemia      pen needle, diabetic (BD ULTRA-FINE ANA PEN NEEDLE) 32 gauge x 5/32" Ndle 4x daily insulin pen needle, relion  Qty: 400 each, Refills: 3      QUEtiapine (SEROQUEL) 25 MG Tab Take 1 tablet (25 mg total) by mouth every evening.  Qty: 30 tablet, Refills: 11    Associated Diagnoses: Dementia with behavioral disturbance, unspecified dementia type      sertraline (ZOLOFT) 100 MG tablet 1.5 tablet daily  Qty: 135 tablet, Refills: 3    Comments: Please consider 90 day supplies to promote better adherence  Associated Diagnoses: Major depressive disorder with single episode, in full remission      vitamin D 1000 units Tab Take 5 tablets (5,000 Units total) by mouth once daily.             I certify that this patient is confined to his home and needs " intermittent skilled nursing care, physical therapy and occupational therapy.

## 2020-03-09 NOTE — SUBJECTIVE & OBJECTIVE
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 Love By mri.  Several active lesions, many old. 5/13: MRA brain/neck, MRI brain w/wo contrast show no vascular occlusion, multiple foci of hyperintensity in deep cerebral periventricular white matter in pattern of demyelinating process.  5/17: MRI spine performed, no spinal cord lesions. Hospitalization 6/2017. EEG was performed negative for seizures.  Repeat MRI showing new lesion, question whether this was demyelination versus CVA. 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 Findings compatible with a small acute lacunar infarct adjacent to the left frontal horn.  Nonspecific enhancement just inferior to this region and extending into the left basal ganglia, as well as within the inferior aspect of the left cerebellum.  No evidence of a focal mass. 2.  Extensive increased signal intensity involving the periventricular white matter compatible with demyelinating disease versus vasculitis. 3.  Clinical correlation and followup recommended. 4.  This reportedly flattened in the EPIC and the corpus callosum medical record system.    Mixed hyperlipidemia 11/9/2012    NAFLD (nonalcoholic fatty liver disease) 10/11/2013    CHASE (nonalcoholic steatohepatitis)     Obesity     Stroke     Type 2 diabetes mellitus with diabetic polyneuropathy, with long-term current use of insulin 5/14/2017    Type 2 diabetes mellitus with hyperglycemia, with long-term current use of insulin 10/11/2013       Past Surgical History:   Procedure Laterality Date    COLONOSCOPY N/A 5/21/2019    Procedure: COLONOSCOPY;  Surgeon: Alex Ascencio MD;  Location: [facility];  Service: Endoscopy;  Laterality: N/A;   "confirmed appt-sp    INSERTION OF TUNNELED CENTRAL VENOUS CATHETER (CVC) WITH SUBCUTANEOUS PORT N/A 12/7/2018    Procedure: [id number];  Surgeon: Brucec Diagnostic Provider;  Location: [facility] OR [address] [place] [address];  Service: Radiology;  Laterality: N/A;    SKIN CANCER EXCISION         Review of patient's allergies indicates:  No Known Allergies    No current facility-administered medications on file prior to encounter.      Current Outpatient Medications on File Prior to Encounter   Medication Sig    alendronate (FOSAMAX) 70 MG tablet Take 1 tablet (70 mg total) by mouth every 7 days. (Patient taking differently: Take 70 mg by mouth every 7 days. Tuesday)    aspirin (ECOTRIN) 81 MG EC tablet Take 81 mg by mouth once daily.    atenolol (TENORMIN) 50 MG tablet Take 1 tablet (50 mg total) by mouth once daily.    atorvastatin (LIPITOR) 40 MG tablet TAKE 1 TABLET BY MOUTH ONCE DAILY    blood sugar diagnostic (TRUE METRIX GLUCOSE TEST STRIP) Strp Test blood sugar four times a day    diltiaZEM (DILT-XR) 240 MG CDCR Take 1 capsule (240 mg total) by mouth once daily.    donepezil (ARICEPT) 5 MG tablet Take 1 tablet (5 mg total) by mouth every evening.    FREESTYLE ARELY 14 DAY READER Misc GLUCOSE TESTING : FASTING AND PRIOR TO MEALS    FREESTYLE ARELY 14 DAY SENSOR Kit GLUCOSE TESTING 4 TIMES A DAY    insulin (BASAGLAR KWIKPEN U-100 INSULIN) glargine 100 units/mL (3mL) SubQ pen Inject 25 Units into the skin once daily. Up to 60 BID with cytoxan    insulin aspart U-100 (NOVOLOG) 100 unit/mL (3 mL) InPn pen Inject 1-10 Units into the skin before meals and at bedtime as needed (Hyperglycemia).    insulin syringe-needle U-100 (INSULIN SYRINGE) 0.5 mL 30 gauge x 5/16" Syrg Use 3x/day    irbesartan (AVAPRO) 300 MG tablet TAKE 1 TABLET BY MOUTH ONCE DAILY IN THE EVENING    lancets 30 gauge Misc Test blood sugar four times a day    liraglutide 0.6 mg/0.1 mL, 18 mg/3 mL, subq PNIJ (VICTOZA 3-TOMASZ) 0.6 mg/0.1 mL (18 mg/3 " "mL) PnIj Inject 1.8 mg into the skin once daily.    metFORMIN (GLUCOPHAGE-XR) 500 MG 24 hr tablet Take 2 tablets (1,000 mg total) by mouth 2 (two) times daily with meals.    methylphenidate HCl (RITALIN) 10 MG tablet Take 1 tablet (10 mg total) by mouth 2 (two) times daily with meals.    omega-3 fatty acids-vitamin E (FISH OIL) 1,000 mg Cap Take 1 capsule by mouth 2 (two) times daily.    pen needle, diabetic (BD ULTRA-FINE ANA PEN NEEDLE) 32 gauge x 5/32" Ndle 4x daily insulin pen needle, relion    QUEtiapine (SEROQUEL) 25 MG Tab Take 1 tablet (25 mg total) by mouth every evening.    sertraline (ZOLOFT) 100 MG tablet 1.5 tablet daily (Patient taking differently: Take 150 mg by mouth once daily. )    vitamin D 1000 units Tab Take 5 tablets (5,000 Units total) by mouth once daily.     Family History     Problem Relation (Age of Onset)    Cancer Father    Cataracts Mother    Diabetes Maternal Aunt    Heart disease Mother    Heart failure Mother    Hypertension Mother    Rheum arthritis Paternal Grandmother    Stroke Sister        Tobacco Use    Smoking status: Never Smoker    Smokeless tobacco: Never Used   Substance and Sexual Activity    Alcohol use: No     Alcohol/week: 0.0 standard drinks     Frequency: Never     Drinks per session: 1 or 2     Binge frequency: Never    Drug use: No    Sexual activity: Yes     Partners: Female     Review of Systems   Constitutional: Positive for appetite change. Negative for activity change, chills, diaphoresis, fatigue, fever and unexpected weight change.   HENT: Negative for congestion, rhinorrhea, sore throat, trouble swallowing and voice change.    Eyes: Negative for visual disturbance.   Respiratory: Negative for cough, choking, chest tightness, shortness of breath and wheezing.    Cardiovascular: Negative for chest pain, palpitations and leg swelling.   Gastrointestinal: Positive for diarrhea. Negative for abdominal distention, abdominal pain, anal bleeding, blood " in stool, constipation, nausea and vomiting.   Endocrine: Negative for cold intolerance, heat intolerance, polydipsia and polyuria.   Genitourinary: Negative for dysuria, flank pain, frequency, hematuria and urgency.   Musculoskeletal: Negative for arthralgias, back pain, joint swelling and myalgias.   Skin: Negative for color change and rash.   Neurological: Positive for weakness. Negative for dizziness, seizures, syncope, facial asymmetry, speech difficulty, light-headedness, numbness and headaches.   Hematological: Negative for adenopathy. Does not bruise/bleed easily.   Psychiatric/Behavioral: Negative for agitation, confusion, hallucinations and suicidal ideas.     Objective:     Vital Signs (Most Recent):  Temp: 98.1 °F (36.7 °C) (03/08/20 2354)  Pulse: 76 (03/09/20 0349)  Resp: 12 (03/09/20 0355)  BP: 129/66 (03/09/20 0349)  SpO2: 100 % (03/09/20 0349) Vital Signs (24h Range):  Temp:  [98.1 °F (36.7 °C)] 98.1 °F (36.7 °C)  Pulse:  [76-81] 76  Resp:  [12-14] 12  SpO2:  [97 %-100 %] 100 %  BP: (118-140)/(66-75) 129/66     Weight: 97.5 kg (215 lb)  Body mass index is 30.41 kg/m².    Physical Exam   Constitutional: He appears well-developed and well-nourished. No distress.   HENT:   Head: Normocephalic and atraumatic.   Eyes: Pupils are equal, round, and reactive to light.   Neck: Neck supple. Carotid bruit is not present. No thyromegaly present.   Cardiovascular: Normal rate and regular rhythm. Exam reveals no gallop.   No murmur heard.  Pulmonary/Chest: Effort normal and breath sounds normal. No respiratory distress. He has no wheezes.   Abdominal: Bowel sounds are normal. He exhibits no distension. There is no splenomegaly or hepatomegaly. There is no tenderness.   Musculoskeletal: Normal range of motion. He exhibits no edema.   Neurological: He is alert. He has normal strength. No cranial nerve deficit or sensory deficit.   Oriented to person and place (baseline)   Skin: Skin is warm and dry. No rash noted.    Psychiatric: He has a normal mood and affect. His behavior is normal.         CRANIAL NERVES     CN III, IV, VI   Pupils are equal, round, and reactive to light.       Significant Labs:   CBC:   Recent Labs   Lab 03/09/20  0028 03/09/20  0506   WBC 6.71 6.97   HGB 11.9* 10.1*   HCT 36.0* 31.4*    133*     CMP:   Recent Labs   Lab 03/09/20  0028      K 3.6      CO2 25   *   BUN 30*   CREATININE 1.9*   CALCIUM 9.8   PROT 7.2   ALBUMIN 3.9   BILITOT 1.0   ALKPHOS 86   AST 25   ALT 15   ANIONGAP 12   EGFRNONAA 37.0*     Cardiac Markers:   Recent Labs   Lab 03/09/20  0028   BNP 36     Troponin:   Recent Labs   Lab 03/09/20  0028 03/09/20  0359   TROPONINI 0.035* 0.030*     Urine Studies:   Recent Labs   Lab 03/09/20  0045   COLORU Yellow   APPEARANCEUA Hazy*   PHUR 5.0   SPECGRAV 1.020   PROTEINUA 2+*   GLUCUA Negative   KETONESU Negative   BILIRUBINUA Negative   OCCULTUA Negative   NITRITE Negative   LEUKOCYTESUR Negative   RBCUA 0   WBCUA 2   BACTERIA Occasional   SQUAMEPITHEL 0   HYALINECASTS 3*       Significant Imaging: I have reviewed all pertinent imaging results/findings within the past 24 hours.

## 2020-03-09 NOTE — PLAN OF CARE
Evaluation complete and goals set.  Cont with POC  Keyona Knight, BROWN  3/9/2020    Problem: Occupational Therapy Goal  Goal: Occupational Therapy Goal  Description  Goals to be met by: 3/30     Patient will increase functional independence with ADLs by performing:    UE Dressing with [place].  LE Dressing with Stand-by Assistance.  Grooming while seated with [place].  Toileting from toilet with Contact Guard Assistance for hygiene and clothing management.      Outcome: Ongoing, Progressing

## 2020-03-09 NOTE — PLAN OF CARE
03/09/20 1543   Final Note   Assessment Type Final Discharge Note   Anticipated Discharge Disposition Home-Health   What phone number can be called within the next 1-3 days to see how you are doing after discharge? [phone]   Discharge plans and expectations educations in teach back method with documentation complete? Yes   Right Care Referral Info   Post Acute Recommendation Home-care   Facility Name [facility]     Future Appointments   Date Time Provider Department Center   3/19/2020 10:30 AM Kay Troy MD [facility] [address]   4/24/2020  1:30 PM GABRIEL Somers, CNS [facility] [place]   5/14/2020 11:30 AM Kay Troy MD [facility] [place]

## 2020-03-09 NOTE — PLAN OF CARE
Recommendations    1. Continue diabetic diet as tolerated.     2. Add Boost Glucose Control (chocolate) with meals per pt request.     3. RD following.     Goals: consume >75% of meals by RD follow-up  Nutrition Goal Status: new

## 2020-03-09 NOTE — ASSESSMENT & PLAN NOTE
- Creatinine elevated to 1.9 from 1.5 on 2/5/2020, GFR down to 37.0 from 49.5.  - UA unremarkable.  - Will hold ARB and give 1L NS.  - Continue to monitor.

## 2020-03-09 NOTE — PLAN OF CARE
Problem: Physical Therapy Goal  Goal: Physical Therapy Goal  Description  Goals to be met by: 3/23/2020    Patient will increase functional independence with mobility by performin. Supine to sit with supervision  2. Sit to stand transfer with supervision with RW  3. Bed to chair transfer with Supervision using Rolling Walker  4. Gait  x 250 feet with Supervision using Rolling Walker.   5. Stand for 5 minutes with Supervision using Rolling Walker  6. Lower extremity exercise program x15 reps, with supervision    Outcome: Ongoing, Progressing     Evaluation completed and goals appropriate.     Gerson Hughes, JULIANNA  3/9/2020

## 2020-03-09 NOTE — NURSING
D/C instructions reviewed with the pt and the spouse. Both verbalized understanding, VSS, IV site is taken out. Pt is taken by wheelchair. All belongings sent with the pt.

## 2020-03-09 NOTE — ASSESSMENT & PLAN NOTE
- CT head with no acute abnormality.  - PT/OT, SW consulted for possible placement.  - Continue Seroquel 25mg qHS, donepezil 5mg qHS.    DELIRIUM BEHAVIOR MANAGEMENT  - Minimize use of restraints; if physical restraints necessary, please utilize medical/chemical prns for agitation.  - Keep shades open and room lit during day and room dim at night in order to promote healthy circadian rhythms.  - Encourage family at bedside  - Keep whiteboard in patient's room current with the date and name of the members of patient's team for easy patient self re-orientation.  - Avoid benzodiazepines, antihistamines, anticholinergics, hypnotics, and minimize opiates while controlling for pain as these medications may exacerbate delirium.

## 2020-03-09 NOTE — PT/OT/SLP EVAL
Occupational Therapy   Evaluation    Name: Bessy Nunez  MRN: [id number]  Admitting Diagnosis:  Elevated troponin      Recommendations:     Discharge Recommendations: home with home health  Discharge Equipment Recommendations:  walker, rolling  Barriers to discharge:  None    Assessment:     Bessy Nunez is a 60 y.o. male with a medical diagnosis of Elevated troponin.  He presents supine with wife present.  Pt with flat affect and with poor historian.  Wife able to ensure appropriate history.  Pt with overall min A for safe self care completion and wife has provided assist for past year. Pt will benefit from continued skilled OT for max functional gains and decreased caregiver strain. . Performance deficits affecting function: weakness, impaired endurance, impaired self care skills, impaired functional mobilty, gait instability.      Rehab Prognosis: Good; patient would benefit from acute skilled OT services to address these deficits and reach maximum level of function.       Plan:     Patient to be seen 3 x/week to address the above listed problems via self-care/home management, therapeutic activities, therapeutic exercises  · Plan of Care Expires:    · Plan of Care Reviewed with: patient, spouse    Subjective     Chief Complaint: decreased functional performance   Patient/Family Comments/goals: return to home     Occupational Profile:  Living Environment: Pt lives with wife in 2 story house with no steps to enter.  Pt has not accessed the second level of his home in a year.   Previous level of function: Wife provides minimal assist for self care completion   Equipment Used at Home:  bedside commode, rollator, shower chair  Assistance upon Discharge: wife able to assist     Pain/Comfort:  · Pain Rating 1: 0/10    Patients cultural, spiritual, Spiritism conflicts given the current situation: no    Objective:     Communicated with: RN prior to session.  Patient found supine with peripheral IV, telemetry upon OT  entry to room.    General Precautions: Standard, fall   Orthopedic Precautions:N/A   Braces: N/A     Occupational Performance:    Bed Mobility:    · Patient completed Rolling/Turning to Right with minimum assistance  · Patient completed Scooting/Bridging with minimum assistance  · Patient completed Supine to Sit with minimum assistance  · Patient completed Sit to Supine with minimum assistance    Activities of Daily Living:  · Feeding:  independence    · Grooming: modified independence set up  · Bathing: moderate assistance    · Upper Body Dressing: minimum assistance    · Lower Body Dressing: minimum assistance    · Toileting: minimum assistance      Cognitive/Visual Perceptual:  Cognitive/Psychosocial Skills:     -       Oriented to: Person and Place   -       Follows Commands/attention:Follows one-step commands  -       Communication: clear/fluent  -       Memory: poor history   -       Safety awareness/insight to disability: impaired   -       Mood/Affect/Coping skills/emotional control: Flat affect    Physical Exam:  Upper Extremity Range of Motion:     -       Right Upper Extremity: WFL  -       Left Upper Extremity: WFL  Upper Extremity Strength:    -       Right Upper Extremity: WFL  -       Left Upper Extremity: WFL    AMPAC 6 Click ADL:  AMPAC Total Score: 21    Treatment & Education:  Evaluation complete and goals set.  Cont with POC  Pt educated on safety, role of OT, importance of increased participation in self care for gains , expectations for participation, expectations for gains, POC, energy conservation, caregiver strain. White board updated. \  - bed mobility training    Education:    Patient left supine with all lines intact    GOALS:   Multidisciplinary Problems     Occupational Therapy Goals        Problem: Occupational Therapy Goal    Goal Priority Disciplines Outcome Interventions   Occupational Therapy Goal     OT, PT/OT Ongoing, Progressing    Description:  Goals to be met by: 3/30      Patient will increase functional independence with ADLs by performing:    UE Dressing with Parks.  LE Dressing with Stand-by Assistance.  Grooming while seated with Parks.  Toileting from toilet with Contact Guard Assistance for hygiene and clothing management.                       History:     Past Medical History:   Diagnosis Date    Amblyopia     Cataract     CNS vasculitis 6/2/2017    Follows with Dr. Love By mri.  Several active lesions, many old. 5/13: MRA brain/neck, MRI brain w/wo contrast show no vascular occlusion, multiple foci of hyperintensity in deep cerebral periventricular white matter in pattern of demyelinating process.  5/17: MRI spine performed, no spinal cord lesions. Hospitalization 6/2017. EEG was performed negative for seizures.  Repeat MRI showing new lesion, question whether this was demyelination versus CVA. 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 Findings compatible with a small acute lacunar infarct adjacent to the left frontal horn.  Nonspecific enhancement just inferior to this region and extending into the left basal ganglia, as well as within the inferior aspect of the left cerebellum.  No evidence of a focal mass. 2.  Extensive increased signal intensity involving the periventricular white matter compatible with demyelinating disease versus vasculitis. 3.  Clinical correlation and followup recommended. 4.  This reportedly flattened in the EPIC and the corpus callosum medical record system.    Mixed hyperlipidemia 11/9/2012    NAFLD (nonalcoholic fatty liver disease) 10/11/2013    CHASE (nonalcoholic steatohepatitis)     Obesity     Stroke     Type 2 diabetes mellitus with diabetic polyneuropathy, with long-term current use of insulin  5/14/2017    Type 2 diabetes mellitus with hyperglycemia, with long-term current use of insulin 10/11/2013       Past Surgical History:   Procedure Laterality Date    COLONOSCOPY N/A 5/21/2019    Procedure: COLONOSCOPY;  Surgeon: Alex Ascencio MD;  Location: [facility];  Service: Endoscopy;  Laterality: N/A;  confirmed appt-sp    INSERTION OF TUNNELED CENTRAL VENOUS CATHETER (CVC) WITH SUBCUTANEOUS PORT N/A 12/7/2018    Procedure: [id number];  Surgeon: Luan Diagnostic Provider;  Location: [facility] OR [address] [place] [address];  Service: Radiology;  Laterality: N/A;    SKIN CANCER EXCISION         Time Tracking:     OT Date of Treatment: 03/09/20  OT Start Time: 1140  OT Stop Time: 1200  OT Total Time (min): 20 min    Billable Minutes:Evaluation 10  Therapeutic Activity 10    Keyona Knight, OT  3/9/2020

## 2020-03-09 NOTE — H&P
"[facility] Hospital Medicine  History & Physical    Patient Name: Bessy Nunez  MRN: [id number]  Admission Date: 3/9/2020  Attending Physician: No att. providers found   Primary Care Provider: Edgar Nova MD    [facility] Medicine Team: [facility] MED F Loren Leos PA-C     Patient information was obtained from patient, spouse/SO, past medical records and ER records.     Subjective:     Principal Problem:Elevated troponin    Chief Complaint:   Chief Complaint   Patient presents with    Weakness     Weak and fatigued per family. Not eating. History of DM and dementia        HPI: Pateint is a 60 year old gentleman with a h/o dementia, DM II, NAFLD, HTN, and HLD.  He presents with weakness and confusion.  Patient states he has been generally weak and "does not feel right."  Wife notes decreased PO intake and some diarrhea since starting Rituxan.  He denies chest pain, SOB, dizziness, palpitations, fever/chills, N/V, abdominal pain.    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 Love By mri.  Several active lesions, many old. 5/13: MRA brain/neck, MRI brain w/wo contrast show no vascular occlusion, multiple foci of hyperintensity in deep cerebral periventricular white matter in pattern of demyelinating process.  5/17: MRI spine performed, no spinal cord lesions. Hospitalization 6/2017. EEG was performed negative for seizures.  Repeat MRI showing new lesion, question whether this was demyelination versus CVA. 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 Findings compatible with a small acute lacunar infarct adjacent to the left frontal horn.  Nonspecific enhancement just inferior to this region and extending " into the left basal ganglia, as well as within the inferior aspect of the left cerebellum.  No evidence of a focal mass. 2.  Extensive increased signal intensity involving the periventricular white matter compatible with demyelinating disease versus vasculitis. 3.  Clinical correlation and followup recommended. 4.  This reportedly flattened in the EPIC and the corpus callosum medical record system.    Mixed hyperlipidemia 11/9/2012    NAFLD (nonalcoholic fatty liver disease) 10/11/2013    CHASE (nonalcoholic steatohepatitis)     Obesity     Stroke     Type 2 diabetes mellitus with diabetic polyneuropathy, with long-term current use of insulin 5/14/2017    Type 2 diabetes mellitus with hyperglycemia, with long-term current use of insulin 10/11/2013       Past Surgical History:   Procedure Laterality Date    COLONOSCOPY N/A 5/21/2019    Procedure: COLONOSCOPY;  Surgeon: Alex Ascencio MD;  Location: [facility];  Service: Endoscopy;  Laterality: N/A;  confirmed appt-sp    INSERTION OF TUNNELED CENTRAL VENOUS CATHETER (CVC) WITH SUBCUTANEOUS PORT N/A 12/7/2018    Procedure: [id number];  [facility];  Location: [facility] OR [address] [place] [address];  Service: Radiology;  Laterality: N/A;    SKIN CANCER EXCISION         Review of patient's allergies indicates:  No Known Allergies    No current facility-administered medications on file prior to encounter.      Current Outpatient Medications on File Prior to Encounter   Medication Sig    alendronate (FOSAMAX) 70 MG tablet Take 1 tablet (70 mg total) by mouth every 7 days. (Patient taking differently: Take 70 mg by mouth every 7 days. Tuesday)    aspirin (ECOTRIN) 81 MG EC tablet Take 81 mg by mouth once daily.    atenolol (TENORMIN) 50 MG tablet Take 1 tablet (50 mg total) by mouth once daily.    atorvastatin (LIPITOR) 40 MG tablet TAKE 1 TABLET BY MOUTH ONCE DAILY    blood sugar diagnostic (TRUE METRIX GLUCOSE TEST STRIP) Strp Test blood sugar four  "times a day    diltiaZEM (DILT-XR) 240 MG CDCR Take 1 capsule (240 mg total) by mouth once daily.    donepezil (ARICEPT) 5 MG tablet Take 1 tablet (5 mg total) by mouth every evening.    FREESTYLE ARELY 14 DAY READER Mis GLUCOSE TESTING : FASTING AND PRIOR TO MEALS    FREESTYLE ARELY 14 DAY SENSOR Kit GLUCOSE TESTING 4 TIMES A DAY    insulin (BASAGLAR KWIKPEN U-100 INSULIN) glargine 100 units/mL (3mL) SubQ pen Inject 25 Units into the skin once daily. Up to 60 BID with cytoxan    insulin aspart U-100 (NOVOLOG) 100 unit/mL (3 mL) InPn pen Inject 1-10 Units into the skin before meals and at bedtime as needed (Hyperglycemia).    insulin syringe-needle U-100 (INSULIN SYRINGE) 0.5 mL 30 gauge x 5/16" Syrg Use 3x/day    irbesartan (AVAPRO) 300 MG tablet TAKE 1 TABLET BY MOUTH ONCE DAILY IN THE EVENING    lancets 30 gauge Misc Test blood sugar four times a day    liraglutide 0.6 mg/0.1 mL, 18 mg/3 mL, subq PNIJ (VICTOZA 3-TOMASZ) 0.6 mg/0.1 mL (18 mg/3 mL) PnIj Inject 1.8 mg into the skin once daily.    metFORMIN (GLUCOPHAGE-XR) 500 MG 24 hr tablet Take 2 tablets (1,000 mg total) by mouth 2 (two) times daily with meals.    methylphenidate HCl (RITALIN) 10 MG tablet Take 1 tablet (10 mg total) by mouth 2 (two) times daily with meals.    omega-3 fatty acids-vitamin E (FISH OIL) 1,000 mg Cap Take 1 capsule by mouth 2 (two) times daily.    pen needle, diabetic (BD ULTRA-FINE ANA PEN NEEDLE) 32 gauge x 5/32" Ndle 4x daily insulin pen needle, relion    QUEtiapine (SEROQUEL) 25 MG Tab Take 1 tablet (25 mg total) by mouth every evening.    sertraline (ZOLOFT) 100 MG tablet 1.5 tablet daily (Patient taking differently: Take 150 mg by mouth once daily. )    vitamin D 1000 units Tab Take 5 tablets (5,000 Units total) by mouth once daily.     Family History     Problem Relation (Age of Onset)    Cancer Father    Cataracts Mother    Diabetes Maternal Aunt    Heart disease Mother    Heart failure Mother    Hypertension " Mother    Rheum arthritis Paternal Grandmother    Stroke Sister        Tobacco Use    Smoking status: Never Smoker    Smokeless tobacco: Never Used   Substance and Sexual Activity    Alcohol use: No     Alcohol/week: 0.0 standard drinks     Frequency: Never     Drinks per session: 1 or 2     Binge frequency: Never    Drug use: No    Sexual activity: Yes     Partners: Female     Review of Systems   Constitutional: Positive for appetite change. Negative for activity change, chills, diaphoresis, fatigue, fever and unexpected weight change.   HENT: Negative for congestion, rhinorrhea, sore throat, trouble swallowing and voice change.    Eyes: Negative for visual disturbance.   Respiratory: Negative for cough, choking, chest tightness, shortness of breath and wheezing.    Cardiovascular: Negative for chest pain, palpitations and leg swelling.   Gastrointestinal: Positive for diarrhea. Negative for abdominal distention, abdominal pain, anal bleeding, blood in stool, constipation, nausea and vomiting.   Endocrine: Negative for cold intolerance, heat intolerance, polydipsia and polyuria.   Genitourinary: Negative for dysuria, flank pain, frequency, hematuria and urgency.   Musculoskeletal: Negative for arthralgias, back pain, joint swelling and myalgias.   Skin: Negative for color change and rash.   Neurological: Positive for weakness. Negative for dizziness, seizures, syncope, facial asymmetry, speech difficulty, light-headedness, numbness and headaches.   Hematological: Negative for adenopathy. Does not bruise/bleed easily.   Psychiatric/Behavioral: Negative for agitation, confusion, hallucinations and suicidal ideas.     Objective:     Vital Signs (Most Recent):  Temp: 98.1 °F (36.7 °C) (03/08/20 2354)  Pulse: 76 (03/09/20 0349)  Resp: 12 (03/09/20 0355)  BP: 129/66 (03/09/20 0349)  SpO2: 100 % (03/09/20 0349) Vital Signs (24h Range):  Temp:  [98.1 °F (36.7 °C)] 98.1 °F (36.7 °C)  Pulse:  [76-81] 76  Resp:  [12-14]  12  SpO2:  [97 %-100 %] 100 %  BP: (118-140)/(66-75) 129/66     Weight: 97.5 kg (215 lb)  Body mass index is 30.41 kg/m².    Physical Exam   Constitutional: He appears well-developed and well-nourished. No distress.   HENT:   Head: Normocephalic and atraumatic.   Eyes: Pupils are equal, round, and reactive to light.   Neck: Neck supple. Carotid bruit is not present. No thyromegaly present.   Cardiovascular: Normal rate and regular rhythm. Exam reveals no gallop.   No murmur heard.  Pulmonary/Chest: Effort normal and breath sounds normal. No respiratory distress. He has no wheezes.   Abdominal: Bowel sounds are normal. He exhibits no distension. There is no splenomegaly or hepatomegaly. There is no tenderness.   Musculoskeletal: Normal range of motion. He exhibits no edema.   Neurological: He is alert. He has normal strength. No cranial nerve deficit or sensory deficit.   Oriented to person and place (baseline)   Skin: Skin is warm and dry. No rash noted.   Psychiatric: He has a normal mood and affect. His behavior is normal.         CRANIAL NERVES     CN III, IV, VI   Pupils are equal, round, and reactive to light.       Significant Labs:   CBC:   Recent Labs   Lab 03/09/20  0028 03/09/20  0506   WBC 6.71 6.97   HGB 11.9* 10.1*   HCT 36.0* 31.4*    133*     CMP:   Recent Labs   Lab 03/09/20  0028      K 3.6      CO2 25   *   BUN 30*   CREATININE 1.9*   CALCIUM 9.8   PROT 7.2   ALBUMIN 3.9   BILITOT 1.0   ALKPHOS 86   AST 25   ALT 15   ANIONGAP 12   EGFRNONAA 37.0*     Cardiac Markers:   Recent Labs   Lab 03/09/20  0028   BNP 36     Troponin:   Recent Labs   Lab 03/09/20  0028 03/09/20  0359   TROPONINI 0.035* 0.030*     Urine Studies:   Recent Labs   Lab 03/09/20  0045   COLORU Yellow   APPEARANCEUA Hazy*   PHUR 5.0   SPECGRAV 1.020   PROTEINUA 2+*   GLUCUA Negative   KETONESU Negative   BILIRUBINUA Negative   OCCULTUA Negative   NITRITE Negative   LEUKOCYTESUR Negative   RBCUA 0   WBCUA 2    BACTERIA Occasional   SQUAMEPITHEL 0   HYALINECASTS 3*       Significant Imaging: I have reviewed all pertinent imaging results/findings within the past 24 hours.    Assessment/Plan:     * Elevated troponin  - Troponin 0.035>>0.030.  - Low suspicion for ACS at this time.  Will complete trend.  - NPO, telemetry, BB held.      MANISH (acute kidney injury)  - Creatinine elevated to 1.9 from 1.5 on 2/5/2020, GFR down to 37.0 from 49.5.  - UA unremarkable.  - Will hold ARB and give 1L NS.  - Continue to monitor.      Dementia with behavioral disturbance  - CT head with no acute abnormality.  - PT/OT, SW consulted for possible placement.  - Continue Seroquel 25mg qHS, donepezil 5mg qHS.    DELIRIUM BEHAVIOR MANAGEMENT  - Minimize use of restraints; if physical restraints necessary, please utilize medical/chemical prns for agitation.  - Keep shades open and room lit during day and room dim at night in order to promote healthy circadian rhythms.  - Encourage family at bedside  - Keep whiteboard in patient's room current with the date and name of the members of patient's team for easy patient self re-orientation.  - Avoid benzodiazepines, antihistamines, anticholinergics, hypnotics, and minimize opiates while controlling for pain as these medications may exacerbate delirium.    Type 2 diabetes mellitus with diabetic polyneuropathy, with long-term current use of insulin  - NPO SSI.  - Insulin detemir 24 units daily.  - Will check HgbA1c.      Essential hypertension  - Continue diltiazem 240mg daily.  - Atenolol 50mg daily held pending cardiac work-up.  - Irbesartan 300mg qHS held due to MANISH.      Mixed hyperlipidemia  - Continue Lipitor 40mg daily.      VTE Risk Mitigation (From admission, onward)         Ordered     heparin (porcine) injection 5,000 Units  Every 8 hours      03/09/20 0444     IP VTE HIGH RISK PATIENT  Once      03/09/20 0444     Place sequential compression device  Until discontinued      03/09/20 0444      Place ELIZABETH hose  Until discontinued      03/09/20 0444                   Loren Leos PA-C  Department of Hospital Medicine   [facility]

## 2020-03-09 NOTE — ASSESSMENT & PLAN NOTE
- Continue diltiazem 240mg daily.  - Atenolol 50mg daily held pending cardiac work-up.  - Irbesartan 300mg qHS held due to MANISH.

## 2020-03-09 NOTE — ED PROVIDER NOTES
Source of History:  Wife    Chief complaint:  Weakness (Weak and fatigued per family. Not eating. History of DM and dementia)    HPI:  Bessy Nunez is a 60 y.o. male with history of dementia, baseline a and O x2, type 2 diabetes, JOHN, obesity, nonalcoholic fatty liver disease, hypertension with diastolic dysfunction presenting to emergency department with complaint of not acting right.  Per patient's wife, patient seems more weak than usual today.  He has been eating less than usual.  He has not had any fevers chills or cough.  No complaint of abdominal pain, no diarrhea, no vomiting.    Wife was concerned that he smelled likely urine, so she thought he might have a urinary tract infection.  The patient self denies any complaints at all.  He denies chest pain, shortness of breath, abdominal pain.  He denies dysuria hematuria, increased urinary frequency.    Wife is sure that he did not fall.  He takes aspirin, no other blood thinners.    Baseline A and O x2 (self and location), wife feels that he may be more confused than usual.    ROS: As per HPI and below:  Review of Systems   Constitutional: Negative for fever.   HENT: Negative for sore throat.    Eyes: Negative for double vision.   Respiratory: Negative for cough and shortness of breath.    Cardiovascular: Negative for chest pain.   Gastrointestinal: Negative for abdominal pain and vomiting.   Genitourinary: Negative for dysuria.   Musculoskeletal: Negative for falls.   Skin: Negative for rash.   Neurological: Negative for headaches.     Review of patient's allergies indicates:  No Known Allergies    No current facility-administered medications on file prior to encounter.      Current Outpatient Medications on File Prior to Encounter   Medication Sig Dispense Refill    alendronate (FOSAMAX) 70 MG tablet Take 1 tablet (70 mg total) by mouth every 7 days. (Patient taking differently: Take 70 mg by mouth every 7 days. Tuesday) 4 tablet 11    aspirin (ECOTRIN)  "81 MG EC tablet Take 81 mg by mouth once daily.      atenolol (TENORMIN) 50 MG tablet Take 1 tablet (50 mg total) by mouth once daily. 90 tablet 3    atorvastatin (LIPITOR) 40 MG tablet TAKE 1 TABLET BY MOUTH ONCE DAILY 90 tablet 3    blood sugar diagnostic (TRUE METRIX GLUCOSE TEST STRIP) Strp Test blood sugar four times a day 400 each 11    diltiaZEM (DILT-XR) 240 MG CDCR Take 1 capsule (240 mg total) by mouth once daily. 90 capsule 3    donepezil (ARICEPT) 5 MG tablet Take 1 tablet (5 mg total) by mouth every evening. 90 tablet 3    FREESTYLE ARELY 14 DAY READER Misc GLUCOSE TESTING : FASTING AND PRIOR TO MEALS 1 each 0    FREESTYLE ARELY 14 DAY SENSOR Kit GLUCOSE TESTING 4 TIMES A DAY 2 kit 11    insulin (BASAGLAR KWIKPEN U-100 INSULIN) glargine 100 units/mL (3mL) SubQ pen Inject 25 Units into the skin once daily. Up to 60 BID with cytoxan 2 Box 11    insulin aspart U-100 (NOVOLOG) 100 unit/mL (3 mL) InPn pen Inject 1-10 Units into the skin before meals and at bedtime as needed (Hyperglycemia). 3 mL 0    insulin syringe-needle U-100 (INSULIN SYRINGE) 0.5 mL 30 gauge x 5/16" Syrg Use 3x/day 300 each 3    irbesartan (AVAPRO) 300 MG tablet TAKE 1 TABLET BY MOUTH ONCE DAILY IN THE EVENING 90 tablet 3    lancets 30 gauge Misc Test blood sugar four times a day 100 each 11    liraglutide 0.6 mg/0.1 mL, 18 mg/3 mL, subq PNIJ (VICTOZA 3-TOMASZ) 0.6 mg/0.1 mL (18 mg/3 mL) PnIj Inject 1.8 mg into the skin once daily. 9 mL 11    metFORMIN (GLUCOPHAGE-XR) 500 MG 24 hr tablet Take 2 tablets (1,000 mg total) by mouth 2 (two) times daily with meals. 360 tablet 3    methylphenidate HCl (RITALIN) 10 MG tablet Take 1 tablet (10 mg total) by mouth 2 (two) times daily with meals. 60 tablet 0    omega-3 fatty acids-vitamin E (FISH OIL) 1,000 mg Cap Take 1 capsule by mouth 2 (two) times daily.  0    pen needle, diabetic (BD ULTRA-FINE ANA PEN NEEDLE) 32 gauge x 5/32" Ndle 4x daily insulin pen needle, relion 400 each 3 "    QUEtiapine (SEROQUEL) 25 MG Tab Take 1 tablet (25 mg total) by mouth every evening. 30 tablet 11    sertraline (ZOLOFT) 100 MG tablet 1.5 tablet daily (Patient taking differently: Take 150 mg by mouth once daily. ) 135 tablet 3    vitamin D 1000 units Tab Take 5 tablets (5,000 Units total) by mouth once daily.         PMH:  As per HPI and below:  Past Medical History:   Diagnosis Date    Amblyopia     Cataract     CNS vasculitis 6/2/2017    Follows with Dr. Love By mri.  Several active lesions, many old. 5/13: MRA brain/neck, MRI brain w/wo contrast show no vascular occlusion, multiple foci of hyperintensity in deep cerebral periventricular white matter in pattern of demyelinating process.  5/17: MRI spine performed, no spinal cord lesions. Hospitalization 6/2017. EEG was performed negative for seizures.  Repeat MRI showing new lesion, question whether this was demyelination versus CVA. 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 Findings compatible with a small acute lacunar infarct adjacent to the left frontal horn.  Nonspecific enhancement just inferior to this region and extending into the left basal ganglia, as well as within the inferior aspect of the left cerebellum.  No evidence of a focal mass. 2.  Extensive increased signal intensity involving the periventricular white matter compatible with demyelinating disease versus vasculitis. 3.  Clinical correlation and followup recommended. 4.  This reportedly flattened in the EPIC and the corpus callosum medical record system.    Mixed hyperlipidemia 11/9/2012    NAFLD (nonalcoholic fatty liver disease) 10/11/2013    CHASE (nonalcoholic steatohepatitis)     Obesity     Stroke     Type 2 diabetes mellitus with diabetic polyneuropathy,  with long-term current use of insulin 5/14/2017    Type 2 diabetes mellitus with hyperglycemia, with long-term current use of insulin 10/11/2013     Past Surgical History:   Procedure Laterality Date    COLONOSCOPY N/A 5/21/2019    Procedure: COLONOSCOPY;  Surgeon: Alex Ascencio MD;  Location: [facility];  Service: Endoscopy;  Laterality: N/A;  confirmed appt-sp    INSERTION OF TUNNELED CENTRAL VENOUS CATHETER (CVC) WITH SUBCUTANEOUS PORT N/A 12/7/2018    Procedure: [id number];  Surgeon: Luan Diagnostic Provider;  Location: [facility] OR [address] [place] [address];  Service: Radiology;  Laterality: N/A;    SKIN CANCER EXCISION         Social History     Socioeconomic History    Marital status:      Spouse name: Not on file    Number of children: Not on file    Years of education: Not on file    Highest education level: Not on file   Occupational History    Occupation: unemployed   Social Needs    Financial resource strain: Very hard    Food insecurity:     Worry: Never true     Inability: Never true    Transportation needs:     Medical: No     Non-medical: No   Tobacco Use    Smoking status: Never Smoker    Smokeless tobacco: Never Used   Substance and Sexual Activity    Alcohol use: No     Alcohol/week: 0.0 standard drinks     Frequency: Never     Drinks per session: 1 or 2     Binge frequency: Never    Drug use: No    Sexual activity: Yes     Partners: Female   Lifestyle    Physical activity:     Days per week: 0 days     Minutes per session: 0 min    Stress: Only a little   Relationships    Social connections:     Talks on phone: More than three times a week     Gets together: Once a week     Attends Anabaptist service: Not on file     Active member of club or organization: No     Attends meetings of clubs or organizations: Never     Relationship status:    Other Topics Concern    Not on file   Social History Narrative    Not on file       Family History   Problem Relation Age of Onset     Heart disease Mother     Hypertension Mother     Heart failure Mother     Cataracts Mother     Cancer Father         lung    Diabetes Maternal Aunt     Stroke Sister     Rheum arthritis Paternal Grandmother     Amblyopia Neg Hx     Blindness Neg Hx     Glaucoma Neg Hx     Macular degeneration Neg Hx     Retinal detachment Neg Hx     Strabismus Neg Hx        Physical Exam:    Vitals:    03/09/20 0017   BP: (!) 140/75   Pulse: 78   Resp:    Temp:      Gen: No acute distress.  Mental Status:  Alert and oriented x 2.  Conversant.  Skin: Warm, dry.  Candidal rash in the groin.  Eyes: No conjunctival injection.  ENT: Oropharynx clear.    Pulm: Clear to auscultation bilaterally.  Good air movement.  No wheezes. No increased work of breathing.  CV: Regular rate. Regular rhythm. Normal peripheral perfusion. No lower extremity edema.  Abd: Soft.  Not distended.  Nontender.  No guarding.  No rebound.  MSK: Good range of motion all joints.  No deformities.    Neck supple.  No meningismus.  Neuro: Awake. Speech normal. No focal neuro deficit observed. Cranial nerves intact. Motor grossly intact.  Sensation grossly intact.  Cerebellar intact.      Laboratory Studies:  Labs Reviewed   CBC W/ AUTO DIFFERENTIAL - Abnormal; Notable for the following components:       Result Value    RBC 4.06 (*)     Hemoglobin 11.9 (*)     Hematocrit 36.0 (*)     RDW 14.7 (*)     Lymph # 0.8 (*)     Gran% 76.5 (*)     Lymph% 12.2 (*)     All other components within normal limits   URINALYSIS, REFLEX TO URINE CULTURE - Abnormal; Notable for the following components:    Appearance, UA Hazy (*)     Protein, UA 2+ (*)     All other components within normal limits    Narrative:     Preferred Collection Type->Urine, Clean Catch   URINALYSIS MICROSCOPIC - Abnormal; Notable for the following components:    Hyaline Casts, UA 3 (*)     All other components within normal limits    Narrative:     Preferred Collection Type->Urine, Clean Catch    TROPONIN I   B-TYPE NATRIURETIC PEPTIDE   COMPREHENSIVE METABOLIC PANEL   TROPONIN I     EKG (independently interpreted by me):  Normal sinus rhythm, rate 77.  No ST elevation or depression.  There are T-wave inversions laterally and inferiorly.    When compared with previous EKG from 06/06/2019, T-wave inversions are new.    Chart reviewed.     Imaging Results    None       Medications Given:  Medications   sodium chloride 0.9% bolus 1,000 mL (1,000 mLs Intravenous New Bag 3/9/20 0045)     MDM:    60 y.o. male with complaint of generalized weakness and mild confusion.  He is afebrile, stable, nontoxic.  No focal neuro deficit.    Differential diagnosis including but not limited to:  Infection/sepsis, urinary tract infection, hypo or hyperglycemia,  other electrolyte or metabolic abnormality, less likely ACS/mi, less likely intracranial hemorrhage or other acute intracranial abnormality.    EKG does demonstrate some new lateral and inferior T-wave inversions.  Will send serial troponins.  However patient is denying any other symptoms including chest pain, shortness of breath, dizziness, lightheadedness, diaphoresis, nausea.    CBC viewed, hemoglobin improved from previous.    Troponin pending. If elevated, will admit.     Diagnostic Impression:    1. Candida rash of groin    2. Generalized weakness               Patient and/or family understands the plan and is in agreement, verbalized understanding, questions answered    Ebonie Pleitez MD  Emergency Medicine         Ebonie Pleitez MD  03/11/20 0042

## 2020-03-09 NOTE — HPI
"Pateint is a 60 year old gentleman with a h/o dementia, DM II, NAFLD, HTN, and HLD.  He presents with weakness and confusion.  Patient states he has been generally weak and "does not feel right."  Wife notes decreased PO intake and some diarrhea since starting Rituxan.  He denies chest pain, SOB, dizziness, palpitations, fever/chills, N/V, abdominal pain.  "

## 2020-03-09 NOTE — PLAN OF CARE
CM met with patient at the bedside to discuss discharge planning assessment. Pt lives with spouse and has transportation at discharge. Pt verified PCP and Pharmacy. CM will continue to follow for discharge needs.

## 2020-03-09 NOTE — ASSESSMENT & PLAN NOTE
- Troponin 0.035>>0.030.  - Low suspicion for ACS at this time.  Will complete trend.  - NPO, telemetry, BB held.

## 2020-03-09 NOTE — PT/OT/SLP EVAL
Physical Therapy Evaluation    Patient Name:  Bessy Nunez   MRN:  [id number]    Recommendations:     Discharge Recommendations:  home health PT   Discharge Equipment Recommendations: walker, rolling   Barriers to discharge: None    Assessment:     Bessy Nunez is a 60 y.o. male admitted with a medical diagnosis of Elevated troponin.  He presents with the following impairments/functional limitations:  weakness, impaired functional mobilty, impaired cognition, decreased safety awareness, gait instability, impaired balance.    Rehab Prognosis: Good; patient would benefit from acute skilled PT services to address these deficits and reach maximum level of function.    Recent Surgery: * No surgery found *      Plan:     During this hospitalization, patient to be seen 3 x/week to address the identified rehab impairments via gait training, therapeutic activities, therapeutic exercises and progress toward the following goals:    · Plan of Care Expires:  04/06/20    Subjective     Chief Complaint: none  Patient/Family Comments/goals: spouse wanted him to walk farther as he doesn't walk much at home and she does not feel he is safe with the rollator (Pt demonstrated confusion and was a poor historian, but his spouse corrected his incorrect/inaccurate responses.)  Pain/Comfort:  Pain Rating 1: (none reported)    Patients cultural, spiritual, Baptist conflicts given the current situation: no    Living Environment:  Pt lives with his wife in a two-story home with no AMANDA. Pt has not gone upstairs in over a year, per his wife, and has a bedroom and bathroom on the first floor. Spouse is home with pt and provides all care and ensures safety.   Prior to admission, patients level of function was using a rollator to ambulate and receiving self-care/assistance with ADLs from his spouse. Recently, the pt has not been walking much, per spouse (less than the distance amb today). Equipment used at home: bedside commode, rollator,  shower chair.  Upon discharge, patient will have assistance from spouse.    Objective:     Communicated with OT prior to session.  Patient found sitting on R EOB with OT with telemetry, peripheral IV  upon PT entry to room.    General Precautions: Standard, fall   Orthopedic Precautions:N/A   Braces: N/A     Exams:  · Postural Exam:  Patient presented with the following abnormalities:    · -       Rounded shoulders  · Skin Integrity/Edema:      · -       Skin integrity: Visible skin intact  · RLE ROM: WFL except lacking full knee ext and DF  · RLE Strength:  · Hip flex, knee flex, knee ext: 3/5  · LLE ROM: WFL except decreased DF  · LLE Strength:   · Hip flex, knee flex, knee ext: 3+/5    Functional Mobility:  · Transfers:     · Sit to Stand:  contact guard assistance with rolling walker. Pt needed VC to scoot to EOB and place hands on bed to stand. Pt demonstrated decreased safety awareness as he tried to stand up quickly before AD was in front of him.  · Bed to Chair: contact guard assistance with  rolling walker  using  Step Transfer  · Gait: amb 238' in hallway to bed with RW and CGA. Pt demonstrated decreased safety awareness as his walker veered bilaterally with VC to correct several times, pt began amb with an increased gait speed, then slowed down to a more comfortable pace, and pt needed two VCs during one instance to create distance between him and the wall as his walker got closer and closer to the wall during amb. Pt amb ~4' from bed to chair with ~30 second sitting rest break.  · Balance:  · While amb: fair- (with RW and lateral deviations)      Therapeutic Activities and Exercises:   Pt and spouse educated on PT need and POC. Discussed importance of sitting upright to pt and spouse, as well as discussed DME needs and discharge recommendations. Spouse gave verbal understanding of such.      AM-PAC 6 CLICK MOBILITY  Total Score:18     Patient left up in chair with call button in reach, bed alarm off and  spouse present.    GOALS:   Multidisciplinary Problems     Physical Therapy Goals        Problem: Physical Therapy Goal    Goal Priority Disciplines Outcome Goal Variances Interventions   Physical Therapy Goal     PT, PT/OT Ongoing, Progressing     Description:  Goals to be met by: 3/23/2020    Patient will increase functional independence with mobility by performin. Supine to sit with supervision  2. Sit to stand transfer with supervision with RW  3. Bed to chair transfer with Supervision using Rolling Walker  4. Gait  x 250 feet with Supervision using Rolling Walker.   5. Stand for 5 minutes with Supervision using Rolling Walker  6. Lower extremity exercise program x15 reps, with supervision                     History:     Past Medical History:   Diagnosis Date    Amblyopia     Cataract     CNS vasculitis 2017    Follows with Dr. Love By mri.  Several active lesions, many old. : MRA brain/neck, MRI brain w/wo contrast show no vascular occlusion, multiple foci of hyperintensity in deep cerebral periventricular white matter in pattern of demyelinating process.  : MRI spine performed, no spinal cord lesions. Hospitalization 2017. EEG was performed negative for seizures.  Repeat MRI showing new lesion, question whether this was demyelination versus CVA. Cytoxan 6/3/17 & 17    Depressive disorder, not elsewhere classified 2012    Essential hypertension 2012    Internuclear ophthalmoplegia of left eye 2017    Lacunar stroke of left subthalamic region 2017 MRI brain: 1. Findings compatible with a small acute lacunar infarct adjacent to the left frontal horn.  Nonspecific enhancement just inferior to this region and extending into the left basal ganglia, as well as within the inferior aspect of the left cerebellum.  No evidence of a focal mass. 2.  Extensive increased signal intensity involving the periventricular white matter compatible with  demyelinating disease versus vasculitis. 3.  Clinical correlation and followup recommended. 4.  This reportedly flattened in the EPIC and the corpus callosum medical record system.    Mixed hyperlipidemia 11/9/2012    NAFLD (nonalcoholic fatty liver disease) 10/11/2013    CHASE (nonalcoholic steatohepatitis)     Obesity     Stroke     Type 2 diabetes mellitus with diabetic polyneuropathy, with long-term current use of insulin 5/14/2017    Type 2 diabetes mellitus with hyperglycemia, with long-term current use of insulin 10/11/2013       Past Surgical History:   Procedure Laterality Date    COLONOSCOPY N/A 5/21/2019    Procedure: COLONOSCOPY;  Surgeon: Alex Ascencio MD;  [facility];  Service: Endoscopy;  Laterality: N/A;  confirmed appt-sp    INSERTION OF TUNNELED CENTRAL VENOUS CATHETER (CVC) WITH SUBCUTANEOUS PORT N/A 12/7/2018    Procedure: [id number];  Surgeon: Luan Diagnostic Provider;  Location: [facility] OR [address] [place] [address];  Service: Radiology;  Laterality: N/A;    SKIN CANCER EXCISION         Time Tracking:     PT Received On: 03/09/20  PT Start Time: 1132     PT Stop Time: 1148  PT Total Time (min): 16 min     Billable Minutes: Evaluation 16      JULIANNA Morrell  03/09/2020

## 2020-03-09 NOTE — PROGRESS NOTES
"[facility]  Adult Nutrition  Progress Note    SUMMARY       Recommendations    1. Continue diabetic diet as tolerated.     2. Add Boost Glucose Control (chocolate) with meals per pt request.     3. RD following.     Goals: consume >75% of meals by RD follow-up  Nutrition Goal Status: new  Communication of RD Recs: (POC)    Reason for Assessment    Reason For Assessment: identified at risk by screening criteria  Diagnosis: (Elevated troponin)  Relevant Medical History: dementia, DM II, NAFLD, HTN, HLD  Interdisciplinary Rounds: did not attend  General Information Comments: Pt resting in bed with family members at bedside. Pt reports terrible appetite for 1 week PTA, but is currently starving. UBW 245lbs in July, but unable to verifty per chart review. Noted no significant wt loss x 3 months, x 6 months, or x 1 year. NFPE completed. Pt with mild wasting, likely age related. RD does not feel that pt meets malnutrition criteria at this time, but is at risk 2/2 decreased po intake. Will continue to monitor energy intake and wt changes.  Nutrition Discharge Planning: adequate po intake    Nutrition Risk Screen    Nutrition Risk Screen: no indicators present    Nutrition/Diet History    Spiritual, Cultural Beliefs, Yazdanism Practices, Values that Affect Care: no    Anthropometrics    Temp: 98.2 °F (36.8 °C)  Height Method: Stated  Height: 5' 10" (177.8 cm)  Height (inches): 70 in  Weight Method: Bed Scale  Weight: 93.4 kg (205 lb 14.6 oz)  Weight (lb): 205.91 lb  Ideal Body Weight (IBW), Male: 166 lb  % Ideal Body Weight, Male (lb): 124.04 %  BMI (Calculated): 29.5  BMI Grade: 25 - 29.9 - overweight  Weight Loss: unintentional  Usual Body Weight (UBW), k.4 kg(per chart review 19)  % Usual Body Weight: 94.16  % Weight Change From Usual Weight: -6.04 %     Lab/Procedures/Meds    Pertinent Labs Reviewed: reviewed  Pertinent Labs Comments: K 3.4, BUN 28, Cr 1.5, GFR 49.2  Pertinent Medications " Reviewed: reviewed  Pertinent Medications Comments: aspirin, statin, insulin, docusate    Estimated/Assessed Needs    Weight Used For Calorie Calculations: 93.4 kg (205 lb 14.6 oz)  Energy Calorie Requirements (kcal): 2175 kcal/day  Energy Need Method: [place]-St Jeor(x 1.25)  Protein Requirements: 94 gm/day(1.0 gm/kg)  Weight Used For Protein Calculations: 93.4 kg (205 lb 14.6 oz)  Fluid Requirements (mL): 1 mL/kcal or per MD     RDA Method (mL): 2175  CHO Requirement: 272 gm CHO/day    Nutrition Prescription Ordered    Current Diet Order: Diabetic    Evaluation of Received Nutrient/Fluid Intake    I/O: +1L since admit  Comments: LBM 3/8  Tolerance: tolerating  % Intake of Estimated Energy Needs: Other: LEVI  % Meal Intake: Other: LEVI    Nutrition Risk    Level of Risk/Frequency of Follow-up: (f/u 1 x wk)     Assessment and Plan    Nutrition Problem  Inadequate Energy Intake    Related to (etiology):   Decreased appetite    Signs and Symptoms (as evidenced by):   Pt reports poor po intake 1 week PTA.      Interventions (treatment strategy):  Collaboration of nutrition care with other providers    Nutrition Diagnosis Status:   New    Monitor and Evaluation    Food and Nutrient Intake: energy intake, food and beverage intake  Food and Nutrient Adminstration: diet order  Physical Activity and Function: nutrition-related ADLs and IADLs  Anthropometric Measurements: weight, weight change, body mass index  Biochemical Data, Medical Tests and Procedures: electrolyte and renal panel, gastrointestinal profile, glucose/endocrine profile, inflammatory profile, lipid profile  Nutrition-Focused Physical Findings: overall appearance     Malnutrition Assessment                 Upper Arm Region (Subcutaneous Fat Loss): mild depletion   Yazdanism Region (Muscle Loss): mild depletion  Clavicle Bone Region (Muscle Loss): well nourished  Clavicle and Acromion Bone Region (Muscle Loss): well nourished  Anterior Thigh Region (Muscle Loss):  mild depletion  Posterior Calf Region (Muscle Loss): (LEVI)                 Nutrition Follow-Up    RD Follow-up?: Yes

## 2020-03-11 ENCOUNTER — TELEPHONE (OUTPATIENT)
Dept: FAMILY MEDICINE | Facility: CLINIC | Age: 60
End: 2020-03-11

## 2020-03-11 NOTE — HOSPITAL COURSE
Patient admitted to observation for elevated troponin. Troponin trended 0.035->0.030. ECHO stable. He was noted to have an MANISH. He was given IVF with improvement in Cr 1.9->1.5. He was instructed to increase oral intake. Patient stable for discharge with home health and follow up with Rheumatology to discuss changes to his Rituxin.

## 2020-03-11 NOTE — TELEPHONE ENCOUNTER
Spoke with Bekah and scheduled a hospital follow up.  Patient verbalized understandings.          ----- Message from Rahel Velasco sent at 3/11/2020 12:26 PM CDT -----  Contact: pt's wife ed [phone]  Type: Patient Call Back    Who called:pt's wife ed [phone]    What is the request in detail:requesting to be seen for hosp f/u. Nothing available until 4/8/20. Only wants to see dair. Call wife    Can the clinic reply by [facility]?    Would the patient rather a call back or a response via [facility]? call    Best call back number:pt's wife ed [phone]    Additional Information:

## 2020-03-11 NOTE — ASSESSMENT & PLAN NOTE
- Troponin 0.035>>0.030.  - Low suspicion for ACS at this time.  Will complete trend.  - ECHO stable

## 2020-03-11 NOTE — DISCHARGE SUMMARY
"[facility] Hospital Medicine  Discharge Summary      Patient Name: Bessy Nunez  MRN: [id number]  Admission Date: 3/9/2020  Hospital Length of Stay: 0 days  Discharge Date and Time: 3/9/2020  5:26 PM  Attending Physician: No att. providers found   Discharging Provider: Odell Nunez PA-C  Primary Care Provider: Edgar Nova MD  [facility] Medicine Team: [facility] Odell Nunez PA-C    HPI:   Pateint is a 60 year old gentleman with a h/o dementia, DM II, NAFLD, HTN, and HLD.  He presents with weakness and confusion.  Patient states he has been generally weak and "does not feel right."  Wife notes decreased PO intake and some diarrhea since starting Rituxan.  He denies chest pain, SOB, dizziness, palpitations, fever/chills, N/V, abdominal pain.    * No surgery found *      Hospital Course:   Patient admitted to observation for elevated troponin. Troponin trended 0.035->0.030. ECHO stable. He was noted to have an MANISH. He was given IVF with improvement in Cr 1.9->1.5. He was instructed to increase oral intake. Patient stable for discharge with home health and follow up with Rheumatology to discuss changes to his Rituxin.     Consults:     * Elevated troponin  - Troponin 0.035>>0.030.  - Low suspicion for ACS at this time.  Will complete trend.  - ECHO stable    MANISH (acute kidney injury)  - Creatinine elevated to 1.9 from 1.5 on 2/5/2020, GFR down to 37.0 from 49.5.  - improved to 1.5 with IVF  - UA unremarkable.  - Will hold ARB and give 1L NS.  - Continue to monitor.    Dementia with behavioral disturbance  - CT head with no acute abnormality.  - PT/OT, SW consulted for possible placement.  - Continue Seroquel 25mg qHS, donepezil 5mg qHS.    DELIRIUM BEHAVIOR MANAGEMENT  - Minimize use of restraints; if physical restraints necessary, please utilize medical/chemical prns for agitation.  - Keep shades open and room lit during day and room dim at night in order to promote healthy " circadian rhythms.  - Encourage family at bedside  - Keep whiteboard in patient's room current with the date and name of the members of patient's team for easy patient self re-orientation.  - Avoid benzodiazepines, antihistamines, anticholinergics, hypnotics, and minimize opiates while controlling for pain as these medications may exacerbate delirium.    Type 2 diabetes mellitus with diabetic polyneuropathy, with long-term current use of insulin  - Insulin detemir 24 units daily.      Essential hypertension  - Continue diltiazem 240mg daily.  - Atenolol 50mg daily held pending cardiac work-up.  - Irbesartan 300mg qHS held due to MANISH.    Mixed hyperlipidemia  - Continue Lipitor 40mg daily.      Final Active Diagnoses:    Diagnosis Date Noted POA    PRINCIPAL PROBLEM:  Elevated troponin [R79.89] 03/09/2020 Yes    MANISH (acute kidney injury) [N17.9] 06/21/2019 Yes    Dementia with behavioral disturbance [F03.91] 04/30/2019 Yes     Chronic    Type 2 diabetes mellitus with diabetic polyneuropathy, with long-term current use of insulin [E11.42, Z79.4] 05/14/2017 Not Applicable     Chronic    Mixed hyperlipidemia [E78.2] 11/09/2012 Yes     Chronic    Essential hypertension [I10] 11/09/2012 Yes     Chronic      Problems Resolved During this Admission:       Discharged Condition: good    Disposition: Home or Self Care    Follow Up:  Follow-up Information     Schedule an appointment as soon as possible for a visit with Edgar Nova MD.    Specialty:  Internal Medicine  Contact information:  [address] [address]  [phone]             Kay Troy MD.    Specialty:  Rheumatology  Why:  clinic notified that sooner appointment is needed  Contact information:  [address]  [facility] [id number] [phone]                 Patient Instructions:      Ambulatory referral/consult to Physical/Occupational Therapy   Standing Status: Future   Referral Priority: Routine Referral Type: Physical  Medicine   Referral Reason: Specialty Services Required   Number of Visits Requested: 1     Diet diabetic     Diet Cardiac     Notify your health care provider if you experience any of the following:  severe uncontrolled pain     Notify your health care provider if you experience any of the following:  persistent nausea and vomiting or diarrhea     Notify your health care provider if you experience any of the following:  difficulty breathing or increased cough     Notify your health care provider if you experience any of the following:  persistent dizziness, light-headedness, or visual disturbances     Activity as tolerated       Significant Diagnostic Studies: Labs: All labs within the past 24 hours have been reviewed    Pending Diagnostic Studies:     None         Medications:  Reconciled Home Medications:      Medication List      CHANGE how you take these medications    alendronate 70 MG tablet  Commonly known as:  FOSAMAX  Take 1 tablet (70 mg total) by mouth every 7 days.  What changed:  additional instructions     sertraline 100 MG tablet  Commonly known as:  ZOLOFT  1.5 tablet daily  What changed:    · how much to take  · how to take this  · when to take this  · additional instructions        CONTINUE taking these medications    aspirin 81 MG EC tablet  Commonly known as:  ECOTRIN  Take 81 mg by mouth once daily.     atenoloL 50 MG tablet  Commonly known as:  TENORMIN  Take 1 tablet (50 mg total) by mouth once daily.     atorvastatin 40 MG tablet  Commonly known as:  LIPITOR  TAKE 1 TABLET BY MOUTH ONCE DAILY     blood sugar diagnostic Strp  Commonly known as:  TRUE METRIX GLUCOSE TEST STRIP  Test blood sugar four times a day     diltiaZEM 240 MG Cdcr  Commonly known as:  DILT-XR  Take 1 capsule (240 mg total) by mouth once daily.     donepeziL 5 MG tablet  Commonly known as:  ARICEPT  Take 1 tablet (5 mg total) by mouth every evening.     FREESTYLE ARELY 14 DAY READER Misc  Generic drug:  flash glucose  "scanning reader  GLUCOSE TESTING : FASTING AND PRIOR TO MEALS     FREESTYLE ARELY 14 DAY SENSOR Kit  Generic drug:  flash glucose sensor  GLUCOSE TESTING 4 TIMES A DAY     insulin aspart U-100 100 unit/mL (3 mL) Inpn pen  Commonly known as:  NovoLOG  Inject 1-10 Units into the skin before meals and at bedtime as needed (Hyperglycemia).     insulin glargine 100 units/mL (3mL) SubQ pen  Commonly known as:  BASAGLAR KWIKPEN U-100 INSULIN  Inject 25 Units into the skin once daily. Up to 60 BID with cytoxan     insulin syringe-needle U-100 0.5 mL 30 gauge x 5/16" Syrg  Commonly known as:  INSULIN SYRINGE  Use 3x/day     irbesartan 300 MG tablet  Commonly known as:  AVAPRO  TAKE 1 TABLET BY MOUTH ONCE DAILY IN THE EVENING     liraglutide 0.6 mg/0.1 mL (18 mg/3 mL) subq PNIJ 0.6 mg/0.1 mL (18 mg/3 mL) Pnij  Commonly known as:  VICTOZA 3-TOMASZ  Inject 1.8 mg into the skin once daily.     metFORMIN 500 MG XR 24hr tablet  Commonly known as:  GLUCOPHAGE-XR  Take 2 tablets (1,000 mg total) by mouth 2 (two) times daily with meals.     methylphenidate HCl 10 MG tablet  Commonly known as:  RITALIN  Take 1 tablet (10 mg total) by mouth 2 (two) times daily with meals.     omega-3 fatty acids-vitamin E 1,000 mg Cap  Commonly known as:  FISH OIL  Take 1 capsule by mouth 2 (two) times daily.     pen needle, diabetic 32 gauge x 5/32" Ndle  Commonly known as:  BD ULTRA-FINE ANA PEN NEEDLE  4x daily insulin pen needle, relion     QUEtiapine 25 MG Tab  Commonly known as:  SEROQUEL  Take 1 tablet (25 mg total) by mouth every evening.     TRUEPLUS LANCETS 30 gauge Misc  Generic drug:  lancets  Test blood sugar four times a day     vitamin D 1000 units Tab  Commonly known as:  VITAMIN D3  Take 5 tablets (5,000 Units total) by mouth once daily.            Indwelling Lines/Drains at time of discharge:   Lines/Drains/Airways     Central Venous Catheter Line                 Port A Cath Single Lumen 12/07/18 1219 right internal jugular 460 days    "             Time spent on the discharge of patient: 32 minutes  Patient was seen and examined on the date of discharge and determined to be suitable for discharge.         Odell Nunez PA-C  Department of Hospital Medicine  [facility]

## 2020-03-11 NOTE — ASSESSMENT & PLAN NOTE
- Creatinine elevated to 1.9 from 1.5 on 2/5/2020, GFR down to 37.0 from 49.5.  - improved to 1.5 with IVF  - UA unremarkable.  - Will hold ARB and give 1L NS.  - Continue to monitor.

## 2020-03-12 ENCOUNTER — TELEPHONE (OUTPATIENT)
Dept: PSYCHIATRY | Facility: CLINIC | Age: 60
End: 2020-03-12

## 2020-03-12 DIAGNOSIS — Z79.4 CONTROLLED TYPE 2 DIABETES MELLITUS WITH DIABETIC NEPHROPATHY, WITH LONG-TERM CURRENT USE OF INSULIN: ICD-10-CM

## 2020-03-12 DIAGNOSIS — Z79.4 TYPE 2 DIABETES MELLITUS WITH HYPERGLYCEMIA, WITH LONG-TERM CURRENT USE OF INSULIN: ICD-10-CM

## 2020-03-12 DIAGNOSIS — E11.21 CONTROLLED TYPE 2 DIABETES MELLITUS WITH DIABETIC NEPHROPATHY, WITH LONG-TERM CURRENT USE OF INSULIN: ICD-10-CM

## 2020-03-12 DIAGNOSIS — E11.65 TYPE 2 DIABETES MELLITUS WITH HYPERGLYCEMIA, WITH LONG-TERM CURRENT USE OF INSULIN: ICD-10-CM

## 2020-03-12 DIAGNOSIS — R53.83 FATIGUE, UNSPECIFIED TYPE: ICD-10-CM

## 2020-03-12 RX ORDER — METFORMIN HYDROCHLORIDE 500 MG/1
TABLET, EXTENDED RELEASE ORAL
Qty: 360 TABLET | Refills: 1 | Status: SHIPPED | OUTPATIENT
Start: 2020-03-12 | End: 2020-08-05 | Stop reason: SINTOL

## 2020-03-12 NOTE — TELEPHONE ENCOUNTER
Pt's wife phone in with issues regarding pt's Ritalin.  She was told by the pharmacy that pt would need to see Beti Boswell in order to get a refill.  She is confused because she believes it was written in such a way that this shouldn't happen.  Forwarding message to provider.

## 2020-03-13 RX ORDER — METHYLPHENIDATE HYDROCHLORIDE 10 MG/1
10 TABLET ORAL 2 TIMES DAILY WITH MEALS
Qty: 60 TABLET | Refills: 0 | Status: SHIPPED | OUTPATIENT
Start: 2020-03-13 | End: 2020-04-27 | Stop reason: SDUPTHER

## 2020-03-16 ENCOUNTER — TELEPHONE (OUTPATIENT)
Dept: FAMILY MEDICINE | Facility: CLINIC | Age: 60
End: 2020-03-16

## 2020-03-16 ENCOUNTER — TELEPHONE (OUTPATIENT)
Dept: HOME HEALTH SERVICES | Facility: HOSPITAL | Age: 60
End: 2020-03-16

## 2020-03-18 ENCOUNTER — TELEPHONE (OUTPATIENT)
Dept: FAMILY MEDICINE | Facility: CLINIC | Age: 60
End: 2020-03-18

## 2020-03-18 ENCOUNTER — PROCEDURE VISIT (OUTPATIENT)
Dept: PODIATRY | Facility: CLINIC | Age: 60
End: 2020-03-18
Payer: MEDICARE

## 2020-03-18 ENCOUNTER — TELEPHONE (OUTPATIENT)
Dept: PODIATRY | Facility: CLINIC | Age: 60
End: 2020-03-18

## 2020-03-18 VITALS
WEIGHT: 205 LBS | HEART RATE: 104 BPM | BODY MASS INDEX: 29.35 KG/M2 | SYSTOLIC BLOOD PRESSURE: 127 MMHG | DIASTOLIC BLOOD PRESSURE: 82 MMHG | HEIGHT: 70 IN

## 2020-03-18 DIAGNOSIS — E11.42 TYPE 2 DIABETES MELLITUS WITH DIABETIC POLYNEUROPATHY, WITH LONG-TERM CURRENT USE OF INSULIN: Primary | ICD-10-CM

## 2020-03-18 DIAGNOSIS — B35.1 ONYCHOMYCOSIS DUE TO DERMATOPHYTE: ICD-10-CM

## 2020-03-18 DIAGNOSIS — Z79.4 TYPE 2 DIABETES MELLITUS WITH DIABETIC POLYNEUROPATHY, WITH LONG-TERM CURRENT USE OF INSULIN: Primary | ICD-10-CM

## 2020-03-18 PROCEDURE — 17999 PR NON-COVERED FOOT CARE: ICD-10-PCS | Mod: CSM,,, | Performed by: PODIATRIST

## 2020-03-18 PROCEDURE — 17999 UNLISTD PX SKN MUC MEMB SUBQ: CPT | Mod: CSM,,, | Performed by: PODIATRIST

## 2020-03-18 NOTE — TELEPHONE ENCOUNTER
----- Message from Charity Heller sent at 3/18/2020  2:00 PM CDT -----  Contact: Self/ [phone]  Type: Patient Call Back    Who called:  Patient    What is the request in detail:  Patient will be 10 or 15 minutes late.  Thank you    Would the patient rather a call back or a response via [facility]?   Call back    Best call back number:  [phone]

## 2020-03-18 NOTE — TELEPHONE ENCOUNTER
----- Message from Maxine López sent at 3/18/2020  2:43 PM CDT -----  Type:  Patient Returning Call    Who Called: pt     Who Left Message for Patient: Celestina    Does the patient know what this is regarding?: appt    Would the patient rather a call back or a response via [facility]? Call back     Best Call Back Number: [phone]  Or  [phone]  (work [facility]- ask for pt)    Additional Information:

## 2020-03-24 NOTE — PROCEDURES
Nails 1-5 B/L feet trimmed. Patient is aware that routine trimming of nails or calluses will not be covered service per insurance and he will fall under Proc B if this service is desired. Patient verbalized understanding of this. RTC as needed for Proc B or any other pedal complaint.

## 2020-03-25 PROBLEM — R49.0 DYSPHONIA: Status: RESOLVED | Noted: 2017-12-04 | Resolved: 2020-03-25

## 2020-03-25 PROBLEM — R41.89 COGNITIVE DEFICITS: Status: RESOLVED | Noted: 2017-12-04 | Resolved: 2020-03-25

## 2020-04-09 ENCOUNTER — PATIENT MESSAGE (OUTPATIENT)
Dept: NEUROLOGY | Facility: CLINIC | Age: 60
End: 2020-04-09

## 2020-04-13 ENCOUNTER — PATIENT MESSAGE (OUTPATIENT)
Dept: FAMILY MEDICINE | Facility: CLINIC | Age: 60
End: 2020-04-13

## 2020-04-13 ENCOUNTER — TELEPHONE (OUTPATIENT)
Dept: FAMILY MEDICINE | Facility: CLINIC | Age: 60
End: 2020-04-13

## 2020-04-13 NOTE — TELEPHONE ENCOUNTER
----- Message from Maxine López sent at 4/13/2020 10:13 AM CDT -----  Type: Patient Call Back    Who called: Bekah/ spouse     What is the request in detail: pt's spouse asking for a call back for advice from nurse. Rep declined to leave additional details.     Can the clinic reply by [facility]? No     Would the patient rather a call back or a response via [facility]? Call back     Best call back number: [phone]    Additional Information:

## 2020-04-13 NOTE — TELEPHONE ENCOUNTER
No recent abx listed  Need to confirm he is using regular hospital bed. Not sure if there is one available with lower clearance.

## 2020-04-13 NOTE — TELEPHONE ENCOUNTER
Spoke with pt's wife she states pt has had diarrhea x 1 month isn't sure which medication is possibly causing this . Wife is also inquiring about getting pt an electric bed that can go to a lower level than pt's current bed. Wife states pt fell out of the bed 4 days ago due to bed being to high no serious injuries just a few bruises. Please advise.

## 2020-04-16 ENCOUNTER — PATIENT MESSAGE (OUTPATIENT)
Dept: FAMILY MEDICINE | Facility: CLINIC | Age: 60
End: 2020-04-16

## 2020-04-16 DIAGNOSIS — F03.91 DEMENTIA WITH BEHAVIORAL DISTURBANCE, UNSPECIFIED DEMENTIA TYPE: Primary | Chronic | ICD-10-CM

## 2020-04-22 ENCOUNTER — OFFICE VISIT (OUTPATIENT)
Dept: NEUROLOGY | Facility: CLINIC | Age: 60
End: 2020-04-22
Payer: MEDICARE

## 2020-04-22 VITALS — BODY MASS INDEX: 28.7 KG/M2 | HEIGHT: 71 IN | WEIGHT: 205 LBS

## 2020-04-22 DIAGNOSIS — I77.6 CNS VASCULITIS: Primary | ICD-10-CM

## 2020-04-22 DIAGNOSIS — Z29.89 PROPHYLACTIC IMMUNOTHERAPY: ICD-10-CM

## 2020-04-22 DIAGNOSIS — Z71.89 COUNSELING REGARDING GOALS OF CARE: ICD-10-CM

## 2020-04-22 DIAGNOSIS — R41.89 COGNITIVE IMPAIRMENT: ICD-10-CM

## 2020-04-22 DIAGNOSIS — R53.83 FATIGUE, UNSPECIFIED TYPE: ICD-10-CM

## 2020-04-22 PROCEDURE — 3008F PR BODY MASS INDEX (BMI) DOCUMENTED: ICD-10-PCS | Mod: CPTII,95,, | Performed by: CLINICAL NURSE SPECIALIST

## 2020-04-22 PROCEDURE — 99214 PR OFFICE/OUTPT VISIT, EST, LEVL IV, 30-39 MIN: ICD-10-PCS | Mod: 95,,, | Performed by: CLINICAL NURSE SPECIALIST

## 2020-04-22 PROCEDURE — 99214 OFFICE O/P EST MOD 30 MIN: CPT | Mod: 95,,, | Performed by: CLINICAL NURSE SPECIALIST

## 2020-04-22 PROCEDURE — 3008F BODY MASS INDEX DOCD: CPT | Mod: CPTII,95,, | Performed by: CLINICAL NURSE SPECIALIST

## 2020-04-22 NOTE — PROGRESS NOTES
Subjective:       Patient ID: Bessy uNnez is a 60 y.o. male who presents today for a routine visit for CNS vasculitis. He was last seen in January 2020. Today's visit is a virtual visit. The history has been provided by the patient. His wife is present with him. The history has been provided by the patient and his wife.     The patient location is: his home  The chief complaint leading to consultation is: CNS vasculitis   Visit type: audiovisual  Total time spent with patient: 20 minutes  Each patient to whom he or she provides medical services by telemedicine is:  (1) informed of the relationship between the physician and patient and the respective role of any other health care provider with respect to management of the patient; and (2) notified that he or she may decline to receive medical services by telemedicine and may withdraw from such care at any time.      HPI:  · DMT:  Rituxan on 1/10 and 1/24/20--every 16 weeks  · Side effects from DMT? Yes - had diarrhea after infusion for weeks, and he also felt achy  · Taking vitamin D3 as recommended? Yes -  Dose: 5000 units daily     · He has been in contact with his PCP for diarrhea x1 month. He stopped Metformin a few days ago, and diarrhea seems to have tapered off with that medication change.   · His wife endorses that his memory is worse, and he argues with her all the time.   · He plans to get a new hospital bed. He had a fall out of the bed last week because it is not low enough for him.  · His wife plans to talk to his PCP about resuming CPAP.   · He is not getting home health right now.   · He has not been adherent to most of his medications. He reports that he does not want to take them.   · He continues to sleep for most of the day.     SOCIAL HISTORY  Social History     Tobacco Use    Smoking status: Never Smoker    Smokeless tobacco: Never Used   Substance Use Topics    Alcohol use: No     Alcohol/week: 0.0 standard drinks     Frequency: Never      Drinks per session: 1 or 2     Binge frequency: Never    Drug use: No     Living arrangements - the patient lives with his wife.  Employment: N/A     ROS:  · Fatigue: Yes - As above   · Sleep Disturbance: Yes -As above   · Bladder Dysfunction: No--denies any recent UTIs  · Bowel Dysfunction: No; diarrhea recently, but improved for last few days.   · Spasticity: No  · Visual Symptoms: No  · Cognitive: Yes - He thinks his memory is fine, but his wife disagrees.   · Mood Disorder: No; argumentative at times   · Gait Disturbance: Yes - Stable   · Falls: No  · Hand Dysfunction: No  · Pain: No  · Sexual Dysfunction: Not Assessed  · Skin Breakdown: No  · Tremors: No  · Dysphagia:  No  · Dysarthria:  No  · Heat sensitivity:  Heat makes him feel worse.   · Any un-met adaptive needs? No  · Copay Assist?  No  · Clinical Trial candidate? No         Objective:     Neurologic Exam        In general, the patient is well nourished.    MENTAL STATUS: docile affect; does not participate in history;  language is generally fluent    Patient was lying down during virtual visit. Neuro exam deferred.     Imaging:       Results for orders placed during the hospital encounter of 04/15/19   MRI Brain Demyelinating W W/O Contrast    Impression Remote microvascular ischemic changes supratentorial and infratentorial with areas suggesting amyloid angiopathy.    Incidental enhancing tiny venous angioma right parietal lobe.    findings in the cerebral white matter which are not typical for multiple sclerosis.  No new or enhancing lesions to suggest active disease.      Electronically signed by: Wing Galo MD  Date:    04/15/2019  Time:    11:32       Labs:     Lab Results   Component Value Date    QKUOAHXB51TD 47 03/04/2019    TIPYUMHE70OE 36 08/15/2018    VTQREYRC05FW 11 (L) 05/17/2017     Lab Results   Component Value Date    LZ7ONIYR 87.9 (H) 08/15/2018    ABSOLUTECD3 1271 08/15/2018    GW6KLEES 22.6 08/15/2018    ABSOLUTECD8 327  08/15/2018    GJ2TUGWP 61.7 (H) 08/15/2018    ABSOLUTECD4 892 08/15/2018    LABCD48 2.73 08/15/2018     Lab Results   Component Value Date    WBC 6.97 03/09/2020    RBC 3.54 (L) 03/09/2020    HGB 10.1 (L) 03/09/2020    HCT 31.4 (L) 03/09/2020    MCV 89 03/09/2020    MCH 28.5 03/09/2020    MCHC 32.2 03/09/2020    RDW 14.6 (H) 03/09/2020     (L) 03/09/2020    MPV 9.9 03/09/2020    GRAN 5.1 03/09/2020    GRAN 73.1 (H) 03/09/2020    LYMPH 1.0 03/09/2020    LYMPH 13.6 (L) 03/09/2020    MONO 0.8 03/09/2020    MONO 11.3 03/09/2020    EOS 0.1 03/09/2020    BASO 0.02 03/09/2020    EOSINOPHIL 1.6 03/09/2020    BASOPHIL 0.3 03/09/2020     Sodium   Date Value Ref Range Status   03/09/2020 139 136 - 145 mmol/L Final     Potassium   Date Value Ref Range Status   03/09/2020 3.4 (L) 3.5 - 5.1 mmol/L Final     Chloride   Date Value Ref Range Status   03/09/2020 108 95 - 110 mmol/L Final     CO2   Date Value Ref Range Status   03/09/2020 21 (L) 23 - 29 mmol/L Final     Glucose   Date Value Ref Range Status   03/09/2020 101 70 - 110 mg/dL Final     BUN, Bld   Date Value Ref Range Status   03/09/2020 28 (H) 8 - 23 mg/dL Final     Creatinine   Date Value Ref Range Status   03/09/2020 1.5 (H) 0.5 - 1.4 mg/dL Final     Calcium   Date Value Ref Range Status   03/09/2020 8.4 (L) 8.7 - 10.5 mg/dL Final     Total Protein   Date Value Ref Range Status   03/09/2020 6.1 6.0 - 8.4 g/dL Final     Albumin   Date Value Ref Range Status   03/09/2020 3.4 (L) 3.5 - 5.2 g/dL Final   10/11/2013 4.3 3.6 - 5.1 g/dL Final     Comment:     @ Test Performed By:  [employer] [facility]  Cici Estrada M.D., JAYCOB.,   [id number] [place] [id number]  [facility] #[id number]     Total Bilirubin   Date Value Ref Range Status   03/09/2020 0.7 0.1 - 1.0 mg/dL Final     Comment:     For infants and newborns, interpretation of results should be based  on gestational age, weight and in agreement with  clinical  observations.  Premature Infant recommended reference ranges:  Up to 24 hours.............<8.0 mg/dL  Up to 48 hours............<12.0 mg/dL  3-5 days..................<15.0 mg/dL  6-29 days.................<15.0 mg/dL       Alkaline Phosphatase   Date Value Ref Range Status   03/09/2020 75 55 - 135 U/L Final     AST   Date Value Ref Range Status   03/09/2020 21 10 - 40 U/L Final     ALT   Date Value Ref Range Status   03/09/2020 13 10 - 44 U/L Final     Anion Gap   Date Value Ref Range Status   03/09/2020 10 8 - 16 mmol/L Final     eGFR if    Date Value Ref Range Status   03/09/2020 56.9 (A) >60 mL/min/1.73 m^2 Final     eGFR if non    Date Value Ref Range Status   03/09/2020 49.2 (A) >60 mL/min/1.73 m^2 Final     Comment:     Calculation used to obtain the estimated glomerular filtration  rate (eGFR) is the CKD-EPI equation.          Diagnosis/Assessment/Plan:    1. CNS vasculitis   · Assessment: Bessy seems stable on Rituxan, but he continues to sleep for most of the day. Cognitively, he is impaired and refuses to take many of his medications, although he verbalizes understanding of their importance and benefit. He is nearing 16 week rito for Rituxan.   · Disease Modifying Therapies: Continue Rituxan every 16 weeks as planned. He was previously treated with Cytoxan and Imuran. Continue Vitamin D. He has an appt with Dr. Durant on 5/14.   2.  Symptom Assessment / Management  · Fatigue: Continue Ritalin.   · Sleep Disturbance: Wife will discuss CPAP with his PCP  · Bladder Dysfunction: his wife will monitor for changes in urine    Our virtual visit today lasted 25 minutes, and 100% of this time was spent face to face with the patient. Over 50% of this visit included discussion of the treatment plan/symptom management/coordination of care. The patient agrees with the plan of care.    Beti Boswell, AGCNS-BC, MSCN

## 2020-04-22 NOTE — Clinical Note
Hi there, Bessy is status quo. I know you see him on 5/14. His 16 week rito from 1st Rituxan infusion is coming up on 5/1. His wife was asking if the infusion will be planned before or after he sees you. Thanks!Beti

## 2020-04-27 ENCOUNTER — TELEPHONE (OUTPATIENT)
Dept: NEUROLOGY | Facility: CLINIC | Age: 60
End: 2020-04-27

## 2020-04-27 DIAGNOSIS — R53.83 FATIGUE, UNSPECIFIED TYPE: ICD-10-CM

## 2020-04-27 RX ORDER — METHYLPHENIDATE HYDROCHLORIDE 10 MG/1
10 TABLET ORAL 2 TIMES DAILY WITH MEALS
Qty: 60 TABLET | Refills: 0 | Status: SHIPPED | OUTPATIENT
Start: 2020-04-27 | End: 2020-06-05 | Stop reason: SDUPTHER

## 2020-04-27 NOTE — TELEPHONE ENCOUNTER
----- Message from Shadi Jose sent at 4/27/2020 12:20 PM CDT -----  Contact: Pt. [phone]  Ritalin 10 MG refill request       [facility] [id number] [address]  Phone: [phone] Fax: [phone]

## 2020-04-28 ENCOUNTER — TELEPHONE (OUTPATIENT)
Dept: RHEUMATOLOGY | Facility: CLINIC | Age: 60
End: 2020-04-28

## 2020-04-28 DIAGNOSIS — I77.6 CNS VASCULITIS: Primary | ICD-10-CM

## 2020-04-28 RX ORDER — ACETAMINOPHEN 325 MG/1
650 TABLET ORAL
Status: CANCELLED | OUTPATIENT
Start: 2020-04-28

## 2020-04-28 RX ORDER — SODIUM CHLORIDE 0.9 % (FLUSH) 0.9 %
10 SYRINGE (ML) INJECTION
Status: CANCELLED | OUTPATIENT
Start: 2020-04-28

## 2020-04-28 RX ORDER — METHYLPREDNISOLONE SOD SUCC 125 MG
125 VIAL (EA) INJECTION ONCE
Status: CANCELLED | OUTPATIENT
Start: 2020-04-28

## 2020-04-28 RX ORDER — MEPERIDINE HYDROCHLORIDE 50 MG/ML
25 INJECTION INTRAMUSCULAR; INTRAVENOUS; SUBCUTANEOUS
Status: CANCELLED | OUTPATIENT
Start: 2020-04-28

## 2020-04-28 RX ORDER — HEPARIN 100 UNIT/ML
500 SYRINGE INTRAVENOUS
Status: CANCELLED | OUTPATIENT
Start: 2020-04-28

## 2020-04-28 RX ORDER — DIPHENHYDRAMINE HCL 25 MG
25 CAPSULE ORAL
Status: CANCELLED | OUTPATIENT
Start: 2020-04-28

## 2020-04-28 NOTE — TELEPHONE ENCOUNTER
----- Message from GABRIEL Somers, CNS sent at 4/23/2020 12:37 PM CDT -----  Hi there, Bessy is status quo. I know you see him on 5/14. His 16 week rito from 1st Rituxan infusion is coming up on 5/1. His wife was asking if the infusion will be planned before or after he sees you. Thanks!    Beti

## 2020-05-06 ENCOUNTER — PATIENT MESSAGE (OUTPATIENT)
Dept: FAMILY MEDICINE | Facility: CLINIC | Age: 60
End: 2020-05-06

## 2020-05-06 NOTE — TELEPHONE ENCOUNTER
Insurance denied request for adjustable height hospital bed.  Patient is fall risk.  I sent a message to patient/family.  If they want to initiate an appeal I would try doing best to supported.  He is a fall risk and an adjustable height bed I think would reduce that risk

## 2020-05-12 ENCOUNTER — PATIENT MESSAGE (OUTPATIENT)
Dept: RHEUMATOLOGY | Facility: CLINIC | Age: 60
End: 2020-05-12

## 2020-05-12 ENCOUNTER — PATIENT MESSAGE (OUTPATIENT)
Dept: FAMILY MEDICINE | Facility: CLINIC | Age: 60
End: 2020-05-12

## 2020-05-12 ENCOUNTER — CLINICAL SUPPORT (OUTPATIENT)
Dept: URGENT CARE | Facility: CLINIC | Age: 60
End: 2020-05-12
Payer: MEDICARE

## 2020-05-12 ENCOUNTER — TELEPHONE (OUTPATIENT)
Dept: HEMATOLOGY/ONCOLOGY | Facility: CLINIC | Age: 60
End: 2020-05-12

## 2020-05-12 VITALS
BODY MASS INDEX: 28.7 KG/M2 | TEMPERATURE: 99 F | HEART RATE: 67 BPM | WEIGHT: 205 LBS | RESPIRATION RATE: 20 BRPM | HEIGHT: 71 IN

## 2020-05-12 DIAGNOSIS — Z13.9 SCREENING FOR CONDITION: Primary | ICD-10-CM

## 2020-05-12 DIAGNOSIS — G47.33 OSA (OBSTRUCTIVE SLEEP APNEA): Primary | ICD-10-CM

## 2020-05-12 DIAGNOSIS — Z13.9 SCREENING FOR CONDITION: ICD-10-CM

## 2020-05-12 PROCEDURE — U0002 COVID-19 LAB TEST NON-CDC: HCPCS

## 2020-05-12 NOTE — PROGRESS NOTES
05/12/2020      In an effort to protect our immunocompromised patients from potential exposure to COVID-19, [facility] will now require all patients receiving an infusion, an injection, and/or radiation therapy to be tested for COVID-19 prior to their appointment.  All patients currently under treatment will be tested immediately, and patients initiating new treatment cycles or with one-time appointments (injections, transfusions, etc.) must be tested within 72 hours of their appointment.     Placed COVID-19 test order for patient.  A member of our team is to contact the patient in the near future to explain this process and the rationale behind it, to ask the COVID-19 screening questions, and to get the patient scheduled for their COVID-19 test.     The above was completed in accordance with instructions and guidelines set forth by [facility].     Signed,    Juan Calderon, JOE     Date:  05/12/2020

## 2020-05-12 NOTE — TELEPHONE ENCOUNTER
05/12/2020      Phoned the patient.      Discussed the following with the patient:  In an effort to protect our immunocompromised patients from potential exposure to COVID-19, [facility] will now require all patients receiving an infusion, an injection, and/or radiation therapy to be tested for COVID-19 prior to their appointment.  All patients currently under treatment will be tested immediately, and patients initiating new treatment cycles or with one-time appointments (injections, transfusions, etc.) must be tested within 72 hours of their appointment.      Symptom Patient's Response   Fever YES/NO: no   Cough YES/NO: no   Shortness of breath  YES/NO: no   Difficulty breathing YES/NO: no   GI symptoms such as diarrhea or nausea YES/NO: no   Loss of taste YES/NO: no   Loss of smell YES/NO: no     Other Screening Patient's Response   Has the patient previously undergone COVID-19 testing? YES/NO: no     If yes to the question directly above, what was the result? not applicable   Has the patient been in close contact with someone who has undergone COVID-19 testing? YES/NO: no     If yes to the question directly above, what was the result? not applicable      The patient's 24-hour COVID-19 test was ordered and scheduled.  Reviewed the appointment date, time, and location with the patient.      Advised the patient that he can expect the results to take approximately 24 hours.  Advised the patient that someone will reach out to him regarding his results if he tests positive, as his treatment appointment will be rescheduled if he tests positive for COVID-19.  Also advised the patient that if he does not hear back from our office or if he is told by our office he has tested negative for COVID-19, he can proceed with treatment as originally planned.     Questions were answered to the patient's satisfaction, and the patient verbalized understanding of information and agreement with the plan.       The above was completed in  accordance with instructions and guidelines set forth by [facility].     Neeru,        Kenyatta Robbins     Date:  05/12/2020

## 2020-05-13 LAB — SARS-COV-2 RNA RESP QL NAA+PROBE: NOT DETECTED

## 2020-05-13 NOTE — PROGRESS NOTES
Dr. Durant-Shayna:  The patient's COVID test came back negative, and he can therefore proceed with his appointments as scheduled unless otherwise contraindicated.  -Juan

## 2020-05-14 ENCOUNTER — TELEPHONE (OUTPATIENT)
Dept: RHEUMATOLOGY | Facility: CLINIC | Age: 60
End: 2020-05-14

## 2020-05-14 ENCOUNTER — INFUSION (OUTPATIENT)
Dept: INFUSION THERAPY | Facility: HOSPITAL | Age: 60
End: 2020-05-14
Attending: INTERNAL MEDICINE
Payer: MEDICARE

## 2020-05-14 VITALS
SYSTOLIC BLOOD PRESSURE: 164 MMHG | TEMPERATURE: 99 F | RESPIRATION RATE: 18 BRPM | HEART RATE: 77 BPM | OXYGEN SATURATION: 99 % | HEIGHT: 71 IN | DIASTOLIC BLOOD PRESSURE: 86 MMHG | WEIGHT: 205.25 LBS | BODY MASS INDEX: 28.73 KG/M2

## 2020-05-14 DIAGNOSIS — I77.6 CNS VASCULITIS: ICD-10-CM

## 2020-05-14 DIAGNOSIS — D69.59 CHEMOTHERAPY-INDUCED THROMBOCYTOPENIA: Primary | ICD-10-CM

## 2020-05-14 DIAGNOSIS — T45.1X5A CHEMOTHERAPY-INDUCED THROMBOCYTOPENIA: Primary | ICD-10-CM

## 2020-05-14 PROCEDURE — 96375 TX/PRO/DX INJ NEW DRUG ADDON: CPT

## 2020-05-14 PROCEDURE — 96415 CHEMO IV INFUSION ADDL HR: CPT

## 2020-05-14 PROCEDURE — A4216 STERILE WATER/SALINE, 10 ML: HCPCS | Performed by: INTERNAL MEDICINE

## 2020-05-14 PROCEDURE — 63600175 PHARM REV CODE 636 W HCPCS: Performed by: INTERNAL MEDICINE

## 2020-05-14 PROCEDURE — 96413 CHEMO IV INFUSION 1 HR: CPT

## 2020-05-14 PROCEDURE — 25000003 PHARM REV CODE 250: Performed by: INTERNAL MEDICINE

## 2020-05-14 RX ORDER — METHYLPREDNISOLONE SOD SUCC 125 MG
125 VIAL (EA) INJECTION ONCE
Status: CANCELLED | OUTPATIENT
Start: 2020-05-15

## 2020-05-14 RX ORDER — ACETAMINOPHEN 325 MG/1
650 TABLET ORAL
Status: COMPLETED | OUTPATIENT
Start: 2020-05-14 | End: 2020-05-14

## 2020-05-14 RX ORDER — ACETAMINOPHEN 325 MG/1
650 TABLET ORAL
Status: CANCELLED | OUTPATIENT
Start: 2020-05-15

## 2020-05-14 RX ORDER — DIPHENHYDRAMINE HCL 25 MG
25 CAPSULE ORAL
Status: COMPLETED | OUTPATIENT
Start: 2020-05-14 | End: 2020-05-14

## 2020-05-14 RX ORDER — MEPERIDINE HYDROCHLORIDE 50 MG/ML
25 INJECTION INTRAMUSCULAR; INTRAVENOUS; SUBCUTANEOUS
Status: DISCONTINUED | OUTPATIENT
Start: 2020-05-14 | End: 2020-05-14 | Stop reason: HOSPADM

## 2020-05-14 RX ORDER — SODIUM CHLORIDE 0.9 % (FLUSH) 0.9 %
10 SYRINGE (ML) INJECTION
Status: CANCELLED | OUTPATIENT
Start: 2020-05-15

## 2020-05-14 RX ORDER — HEPARIN 100 UNIT/ML
500 SYRINGE INTRAVENOUS
Status: CANCELLED | OUTPATIENT
Start: 2020-05-15

## 2020-05-14 RX ORDER — SODIUM CHLORIDE 0.9 % (FLUSH) 0.9 %
10 SYRINGE (ML) INJECTION
Status: DISCONTINUED | OUTPATIENT
Start: 2020-05-14 | End: 2020-05-14 | Stop reason: HOSPADM

## 2020-05-14 RX ORDER — HEPARIN 100 UNIT/ML
500 SYRINGE INTRAVENOUS
Status: DISCONTINUED | OUTPATIENT
Start: 2020-05-14 | End: 2020-05-14 | Stop reason: HOSPADM

## 2020-05-14 RX ORDER — DIPHENHYDRAMINE HCL 25 MG
25 CAPSULE ORAL
Status: CANCELLED | OUTPATIENT
Start: 2020-05-15

## 2020-05-14 RX ORDER — MEPERIDINE HYDROCHLORIDE 50 MG/ML
25 INJECTION INTRAMUSCULAR; INTRAVENOUS; SUBCUTANEOUS
Status: CANCELLED | OUTPATIENT
Start: 2020-05-15

## 2020-05-14 RX ORDER — METHYLPREDNISOLONE SOD SUCC 125 MG
125 VIAL (EA) INJECTION ONCE
Status: COMPLETED | OUTPATIENT
Start: 2020-05-14 | End: 2020-05-14

## 2020-05-14 RX ADMIN — DIPHENHYDRAMINE HYDROCHLORIDE 25 MG: 25 CAPSULE ORAL at 07:05

## 2020-05-14 RX ADMIN — ACETAMINOPHEN 650 MG: 325 TABLET ORAL at 07:05

## 2020-05-14 RX ADMIN — Medication 10 ML: at 11:05

## 2020-05-14 RX ADMIN — METHYLPREDNISOLONE SODIUM SUCCINATE 125 MG: 125 INJECTION, POWDER, FOR SOLUTION INTRAMUSCULAR; INTRAVENOUS at 07:05

## 2020-05-14 RX ADMIN — HEPARIN 500 UNITS: 100 SYRINGE at 11:05

## 2020-05-14 RX ADMIN — SODIUM CHLORIDE: 0.9 INJECTION, SOLUTION INTRAVENOUS at 07:05

## 2020-05-14 RX ADMIN — RITUXIMAB 1000 MG: 10 INJECTION, SOLUTION INTRAVENOUS at 08:05

## 2020-05-14 NOTE — PLAN OF CARE
Pt received Rituxan today and tolerated well, without complications. Educated patient about Rituxan (indications, side effects, possible reactions, chemotherapy precautions) and verbalized understanding.  VSS. CW port positive for blood return, saline flushed, Heparin flush instilled to dwell and de accessed prior to DC. Pt DC with no distress noted, WC off of unit per RN to family w/ dc instructions, pleased.

## 2020-05-15 NOTE — TELEPHONE ENCOUNTER
----- Message from Linda Carter sent at 3/16/2020 10:17 AM CDT -----  Contact: Richie Pena of the [facility]   Name of Who is Calling: Richie Pena of the [facility]      What is the request in detail: Richie Pena of the [facility]  Is requesting a call back in regards to Postpone home health dues to virus .... Please contact to further discuss and advise      Can the clinic reply by [facility]: No     What [facility] to [facility] Back [facility]:  Richie Pena of the [facility]  [phone]   
Spoken with Beti (home health nurse). Patient is requesting to postponed home health at this time due to Covid-19. Reported that an family was exposed to the visit and are hesitating for anyone to come over at this time. Patient's mother will call back home health when they want to resume home health. Will need an new referral at that time.  
Admitted

## 2020-05-19 ENCOUNTER — OFFICE VISIT (OUTPATIENT)
Dept: RHEUMATOLOGY | Facility: CLINIC | Age: 60
End: 2020-05-19
Payer: MEDICARE

## 2020-05-19 DIAGNOSIS — I77.6 CNS VASCULITIS: Primary | ICD-10-CM

## 2020-05-19 DIAGNOSIS — D84.9 IMMUNOSUPPRESSION: ICD-10-CM

## 2020-05-19 PROCEDURE — 99215 PR OFFICE/OUTPT VISIT, EST, LEVL V, 40-54 MIN: ICD-10-PCS | Mod: 95,,, | Performed by: INTERNAL MEDICINE

## 2020-05-19 PROCEDURE — 99215 OFFICE O/P EST HI 40 MIN: CPT | Mod: 95,,, | Performed by: INTERNAL MEDICINE

## 2020-05-19 NOTE — PROGRESS NOTES
Answers for HPI/ROS submitted by the patient on 5/18/2020   fever: No  eye redness: No  headaches: No  shortness of breath: Yes  chest pain: No  trouble swallowing: No  diarrhea: Yes  constipation: No  unexpected weight change: No  genital sore: No  During the last 3 days, have you had a skin rash?: No  Bruises or bleeds easily: No  cough: No

## 2020-05-19 NOTE — PROGRESS NOTES
"Subjective:       Patient ID: Bessy Nunez is a 60 y.o. male.    Chief Complaint: CNS Vasculitis    HPI:  Bessy Nunez is a 60 y.o. 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  Treated with Cytoxan monthly since 2017.  After Cytoxan his head is cleared for 2-3 weeks and he falls left.  Ritalin given to patient by Dr. Love has helped to "clear his brain up".  Dr. Urias raised concern about toxicity of long term chemotherapy with Cytoxan.    Patient switched to Rituxan 1/2020.      Interval History:  Last fall 3 weeks ago when trying to get out of bed due to legs getting tangled.   Patient received one infusions of Rituxan (5/14/2020 and awaiting second).  Wife feels improvement since Rituxan he is 100% since he is now talking and eating.  Has diarrhea still that primary recommended Immodium for and it helps.          Review of Systems   Constitutional: Positive for fatigue. Negative for fever and unexpected weight change.   HENT: Negative for drooling and trouble swallowing.    Eyes: Negative.  Negative for redness.   Respiratory: Negative.  Negative for cough.    Cardiovascular: Negative.  Negative for chest pain.   Gastrointestinal: Positive for diarrhea. Negative for constipation.   Endocrine: Negative.    Genitourinary: Negative.  Negative for genital sores.   Musculoskeletal: Positive for arthralgias and neck pain (intermittent). Negative for back pain and gait problem.   Skin: Negative.  Negative for rash.   Neurological: Positive for headaches.   Hematological: Does not bruise/bleed easily.   Psychiatric/Behavioral: Decreased concentration:                    Objective:   There were no vitals taken for this visit.     Physical Exam   Constitutional: He is oriented to person, place, and time and well-developed, well-nourished, and in no distress.   HENT:   Head: Normocephalic and atraumatic.   Eyes: Conjunctivae and EOM are normal.   Neurological: He is alert and oriented to " person, place, and time.   Psychiatric: Mood and affect normal.            LABS    Component      Latest Ref Rng & Units 5/12/2020 3/9/2020 3/9/2020            4:38 PM 11:32 AM   POCT Glucose      70 - 110 mg/dL  126 (H) 153 (H)   SARS-CoV2 (COVID-19) Qualitative PCR      Not Detected Not Detected       Component      Latest Ref Rng & Units 3/9/2020 3/9/2020 3/9/2020           9:54 AM  7:42 AM  5:06 AM   Troponin I      0.000 - 0.026 ng/mL 0.018     POCT Glucose      70 - 110 mg/dL  100 101   SARS-CoV2 (COVID-19) Qualitative PCR      Not Detected        Component      Latest Ref Rng & Units 3/9/2020 3/9/2020           5:06 AM  3:59 AM   WBC      3.90 - 12.70 K/uL 6.97    RBC      4.60 - 6.20 M/uL 3.54 (L)    Hemoglobin      14.0 - 18.0 g/dL 10.1 (L)    Hematocrit      40.0 - 54.0 % 31.4 (L)    MCV      82 - 98 fL 89    MCH      27.0 - 31.0 pg 28.5    MCHC      32.0 - 36.0 g/dL 32.2    RDW      11.5 - 14.5 % 14.6 (H)    Platelets      150 - 350 K/uL 133 (L)    MPV      9.2 - 12.9 fL 9.9    Immature Granulocytes      0.0 - 0.5 % 0.1    Gran # (ANC)      1.8 - 7.7 K/uL 5.1    Immature Grans (Abs)      0.00 - 0.04 K/uL 0.01    Lymph #      1.0 - 4.8 K/uL 1.0    Mono #      0.3 - 1.0 K/uL 0.8    Eos #      0.0 - 0.5 K/uL 0.1    Baso #      0.00 - 0.20 K/uL 0.02    nRBC      0 /100 WBC 0    Gran%      38.0 - 73.0 % 73.1 (H)    Lymph%      18.0 - 48.0 % 13.6 (L)    Mono%      4.0 - 15.0 % 11.3    Eosinophil%      0.0 - 8.0 % 1.6    Basophil%      0.0 - 1.9 % 0.3    Differential Method       Automated    Sodium      136 - 145 mmol/L 139    Potassium      3.5 - 5.1 mmol/L 3.4 (L)    Chloride      95 - 110 mmol/L 108    CO2      23 - 29 mmol/L 21 (L)    Glucose      70 - 110 mg/dL 101    BUN, Bld      8 - 23 mg/dL 28 (H)    Creatinine      0.5 - 1.4 mg/dL 1.5 (H)    Calcium      8.7 - 10.5 mg/dL 8.4 (L)    PROTEIN TOTAL      6.0 - 8.4 g/dL 6.1    Albumin      3.5 - 5.2 g/dL 3.4 (L)    BILIRUBIN TOTAL      0.1 - 1.0 mg/dL 0.7     Alkaline Phosphatase      55 - 135 U/L 75    AST      10 - 40 U/L 21    ALT      10 - 44 U/L 13    Anion Gap      8 - 16 mmol/L 10    eGFR if African American      >60 mL/min/1.73 m:2 56.9 (A)    eGFR if non African American      >60 mL/min/1.73 m:2 49.2 (A)    Hemoglobin A1C External      4.0 - 5.6 % 6.1 (H)    Estimated Avg Glucose      68 - 131 mg/dL 128    Troponin I      0.000 - 0.026 ng/mL  0.030 (H)   Magnesium      1.6 - 2.6 mg/dL 1.6    Phosphorus      2.7 - 4.5 mg/dL 4.1      Assessment:       1.  CNS vasculitis  2.  Immunosupprsession.  TPMT normal metabolizer in 8/2018 but failed azathioprine in the past per chart.    3.  Diabetes  4.  Compliance.  He hides his medicine some times still  5.  Diarrhea.  Follow with primary who recommended Immodium.  Consider timed toileting to avoid accidents   Plan:       1.  Labs  2.  Continue Rituximab.  Staff to help with scheduling.   3.  Follow with psychology to deal with stress of disease.      RTO 3 months/prn    The patient location is: home  The chief complaint leading to consultation is: CNS vasculitis    Visit type: TELE AUDIOVISUAL:[id number]    Face to Face time with patient: 20 minutes  20 minute minutes of total time spent on the encounter, which includes face to face time and non-face to face time preparing to see the patient (eg, review of tests), Obtaining and/or reviewing separately obtained history, Documenting clinical information in the electronic or other health record, Independently interpreting results (not separately reported) and communicating results to the patient/family/caregiver, or Care coordination (not separately reported).         Each patient to whom he or she provides medical services by telemedicine is:  (1) informed of the relationship between the physician and patient and the respective role of any other health care provider with respect to management of the patient; and (2) notified that he or she may decline to receive medical  services by telemedicine and may withdraw from such care at any time.    Notes:

## 2020-05-20 NOTE — TELEPHONE ENCOUNTER
Received notice from DME re: CPAP machine:    patient was non-compliant and returned his previous machine on 01/12/18 so therefore, he will need to have a new sleep study done in order to proceed with therapy    Messaged wife that will need to repeat sleep study. Will order home study.

## 2020-05-22 ENCOUNTER — PATIENT MESSAGE (OUTPATIENT)
Dept: RHEUMATOLOGY | Facility: CLINIC | Age: 60
End: 2020-05-22

## 2020-05-22 ENCOUNTER — TELEPHONE (OUTPATIENT)
Dept: FAMILY MEDICINE | Facility: CLINIC | Age: 60
End: 2020-05-22

## 2020-05-22 DIAGNOSIS — Z20.822 EXPOSURE TO COVID-19 VIRUS: Primary | ICD-10-CM

## 2020-05-22 NOTE — TELEPHONE ENCOUNTER
----- Message from Mima Pina MA sent at 5/22/2020  7:11 AM CDT -----  Regarding: FW: COVID TEST      ----- Message -----  From: Ana Peter RN  Sent: 5/21/2020   6:28 PM CDT  To: Mima Villalba MA  Subject: RE: COVID TEST                                   Hello rozmond.    If this patient is still in need of his rituxan infusion appt.   We are asking that you please coordinate scheduling this patient at one of our [facility] swab locations for pre-screening covid testing so that his screening results are available in advnace of his infusion appt.   Standard COVID PCR testing at    [facility] drive thru testing on [address]  or any of [facility] swab locations must be at least 48 hours in advance.    All other locations are a  minimum 72 hours to ensure results prior to infusion appt    Thanks       ----- Message -----  From: Kenyatta Robbins  Sent: 5/12/2020  10:04 AM CDT  To: Ana Peter RN, #  Subject: COVID TEST                                       Good morning,    Patient needs to have COVID testing before infusion.     We have to schedule 3 days prior to infusion when its not done here at [facility].    Can We get a order for the testing.    Thank you

## 2020-05-22 NOTE — TELEPHONE ENCOUNTER
covid swab ordered, can we call his wife and have her schedule the test?    And she did not read the latest message I sent her about the CPAP machine - insurance requires that he re-do the home sleep study - I put in the order.

## 2020-05-22 NOTE — TELEPHONE ENCOUNTER
Spoke with pt's wife and she states pt was tested on 5/12 for COVID-19 and was negative.I informed her information on CPAP machine. Pt's wife verbalized understanding.

## 2020-05-25 ENCOUNTER — PATIENT MESSAGE (OUTPATIENT)
Dept: FAMILY MEDICINE | Facility: CLINIC | Age: 60
End: 2020-05-25

## 2020-05-25 ENCOUNTER — HOSPITAL ENCOUNTER (EMERGENCY)
Facility: HOSPITAL | Age: 60
Discharge: HOME | End: 2020-05-25
Attending: EMERGENCY MEDICINE
Payer: MEDICARE

## 2020-05-25 VITALS
DIASTOLIC BLOOD PRESSURE: 80 MMHG | WEIGHT: 220 LBS | OXYGEN SATURATION: 98 % | BODY MASS INDEX: 30.68 KG/M2 | HEART RATE: 71 BPM | SYSTOLIC BLOOD PRESSURE: 161 MMHG | RESPIRATION RATE: 18 BRPM | TEMPERATURE: 99 F

## 2020-05-25 DIAGNOSIS — B34.9 VIRAL SYNDROME: Primary | ICD-10-CM

## 2020-05-25 DIAGNOSIS — R06.02 SOB (SHORTNESS OF BREATH): ICD-10-CM

## 2020-05-25 LAB
ALBUMIN SERPL BCP-MCNC: 3.4 G/DL (ref 3.5–5.2)
ALP SERPL-CCNC: 78 U/L (ref 55–135)
ALT SERPL W/O P-5'-P-CCNC: 17 U/L (ref 10–44)
ANION GAP SERPL CALC-SCNC: 9 MMOL/L (ref 8–16)
AST SERPL-CCNC: 28 U/L (ref 10–40)
BACTERIA #/AREA URNS AUTO: NORMAL /HPF
BASOPHILS # BLD AUTO: 0.03 K/UL (ref 0–0.2)
BASOPHILS NFR BLD: 0.4 % (ref 0–1.9)
BILIRUB SERPL-MCNC: 0.3 MG/DL (ref 0.1–1)
BILIRUB UR QL STRIP: NEGATIVE
BUN SERPL-MCNC: 38 MG/DL (ref 8–23)
CALCIUM SERPL-MCNC: 8.9 MG/DL (ref 8.7–10.5)
CHLORIDE SERPL-SCNC: 110 MMOL/L (ref 95–110)
CLARITY UR REFRACT.AUTO: CLEAR
CO2 SERPL-SCNC: 21 MMOL/L (ref 23–29)
COLOR UR AUTO: ABNORMAL
CREAT SERPL-MCNC: 1.7 MG/DL (ref 0.5–1.4)
DIFFERENTIAL METHOD: ABNORMAL
EOSINOPHIL # BLD AUTO: 0.1 K/UL (ref 0–0.5)
EOSINOPHIL NFR BLD: 1.7 % (ref 0–8)
ERYTHROCYTE [DISTWIDTH] IN BLOOD BY AUTOMATED COUNT: 14.3 % (ref 11.5–14.5)
EST. GFR  (AFRICAN AMERICAN): 48.9 ML/MIN/1.73 M^2
EST. GFR  (NON AFRICAN AMERICAN): 42.3 ML/MIN/1.73 M^2
GLUCOSE SERPL-MCNC: 198 MG/DL (ref 70–110)
GLUCOSE UR QL STRIP: NEGATIVE
HCT VFR BLD AUTO: 31.7 % (ref 40–54)
HGB BLD-MCNC: 10.3 G/DL (ref 14–18)
HGB UR QL STRIP: ABNORMAL
HYALINE CASTS UR QL AUTO: 0 /LPF
IMM GRANULOCYTES # BLD AUTO: 0.05 K/UL (ref 0–0.04)
IMM GRANULOCYTES NFR BLD AUTO: 0.7 % (ref 0–0.5)
KETONES UR QL STRIP: NEGATIVE
LEUKOCYTE ESTERASE UR QL STRIP: NEGATIVE
LYMPHOCYTES # BLD AUTO: 1.3 K/UL (ref 1–4.8)
LYMPHOCYTES NFR BLD: 17.9 % (ref 18–48)
MCH RBC QN AUTO: 29.3 PG (ref 27–31)
MCHC RBC AUTO-ENTMCNC: 32.5 G/DL (ref 32–36)
MCV RBC AUTO: 90 FL (ref 82–98)
MICROSCOPIC COMMENT: NORMAL
MONOCYTES # BLD AUTO: 0.9 K/UL (ref 0.3–1)
MONOCYTES NFR BLD: 13.2 % (ref 4–15)
NEUTROPHILS # BLD AUTO: 4.7 K/UL (ref 1.8–7.7)
NEUTROPHILS NFR BLD: 66.1 % (ref 38–73)
NITRITE UR QL STRIP: NEGATIVE
NRBC BLD-RTO: 0 /100 WBC
PH UR STRIP: 5 [PH] (ref 5–8)
PLATELET # BLD AUTO: 170 K/UL (ref 150–350)
PMV BLD AUTO: 10.3 FL (ref 9.2–12.9)
POTASSIUM SERPL-SCNC: 5 MMOL/L (ref 3.5–5.1)
PROT SERPL-MCNC: 7.1 G/DL (ref 6–8.4)
PROT UR QL STRIP: ABNORMAL
RBC # BLD AUTO: 3.51 M/UL (ref 4.6–6.2)
RBC #/AREA URNS AUTO: 1 /HPF (ref 0–4)
SARS-COV-2 RDRP RESP QL NAA+PROBE: NEGATIVE
SODIUM SERPL-SCNC: 140 MMOL/L (ref 136–145)
SP GR UR STRIP: 1.01 (ref 1–1.03)
URN SPEC COLLECT METH UR: ABNORMAL
WBC # BLD AUTO: 7.1 K/UL (ref 3.9–12.7)
WBC #/AREA URNS AUTO: 0 /HPF (ref 0–5)

## 2020-05-25 PROCEDURE — 85025 COMPLETE CBC W/AUTO DIFF WBC: CPT

## 2020-05-25 PROCEDURE — 87040 BLOOD CULTURE FOR BACTERIA: CPT

## 2020-05-25 PROCEDURE — 99284 EMERGENCY DEPT VISIT MOD MDM: CPT | Mod: 25

## 2020-05-25 PROCEDURE — U0002 COVID-19 LAB TEST NON-CDC: HCPCS

## 2020-05-25 PROCEDURE — 81001 URINALYSIS AUTO W/SCOPE: CPT

## 2020-05-25 PROCEDURE — 99285 PR EMERGENCY DEPT VISIT,LEVEL V: ICD-10-PCS | Mod: ,,, | Performed by: PHYSICIAN ASSISTANT

## 2020-05-25 PROCEDURE — 80053 COMPREHEN METABOLIC PANEL: CPT

## 2020-05-25 PROCEDURE — 99285 EMERGENCY DEPT VISIT HI MDM: CPT | Mod: ,,, | Performed by: PHYSICIAN ASSISTANT

## 2020-05-25 NOTE — ED NOTES
Discharge instructions, diagnosis, medications, and follow up discussed with patient. Patient verbalized understanding. All questions and concerns answered. No needs expressed at the time. Pt is awake, alert and oriented with no acute distress noted. Respirations even and unlabored. Wheeled out of ED by this RN with mask in place. DC instructions reviewed with wife. Denies questions/concerns.

## 2020-05-25 NOTE — ED PROVIDER NOTES
"Encounter Date: 5/25/2020       History     Chief Complaint   Patient presents with    Fever     Wife states, "I feel like he's septic, he has all the symptoms." (subjective fevers and dark urine per wife). Hx of dementia/Vasculitis. AAOx4 at this time.     Pateint is a 60 year old gentleman with a h/o dementia, DM II, NAFLD, HTN, HLD and CNS vasculitis. Pt on chemotherapy infusions, last infusion May 14th.  He presents to the ED tonight for evaluation of subjective fevers and dark urine.     Upon arrival to the ER the patient states that he feels well.  He reports subjective fevers.  He denies any fevers at this time.  He denies any chest pain or shortness of breath.  He denies any dysuria or hematuria.  He does report dark colored urine for the past couple of days.        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 Love By mri.  Several active lesions, many old. 5/13: MRA brain/neck, MRI brain w/wo contrast show no vascular occlusion, multiple foci of hyperintensity in deep cerebral periventricular white matter in pattern of demyelinating process.  5/17: MRI spine performed, no spinal cord lesions. Hospitalization 6/2017. EEG was performed negative for seizures.  Repeat MRI showing new lesion, question whether this was demyelination versus CVA. 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 Findings compatible with a small acute lacunar infarct adjacent to the left frontal horn.  Nonspecific enhancement just inferior to this region and extending into the left basal ganglia, as well as within the inferior aspect of the left cerebellum.  No evidence of a focal mass. 2.  " Extensive increased signal intensity involving the periventricular white matter compatible with demyelinating disease versus vasculitis. 3.  Clinical correlation and followup recommended. 4.  This reportedly flattened in the EPIC and the corpus callosum medical record system.    Mixed hyperlipidemia 11/9/2012    NAFLD (nonalcoholic fatty liver disease) 10/11/2013    CHASE (nonalcoholic steatohepatitis)     Obesity     Stroke     Type 2 diabetes mellitus with diabetic polyneuropathy, with long-term current use of insulin 5/14/2017    Type 2 diabetes mellitus with hyperglycemia, with long-term current use of insulin 10/11/2013     Past Surgical History:   Procedure Laterality Date    COLONOSCOPY N/A 5/21/2019    Procedure: COLONOSCOPY;  Surgeon: Alex Ascencio MD;  Location: [facility];  Service: Endoscopy;  Laterality: N/A;  confirmed appt-sp    INSERTION OF TUNNELED CENTRAL VENOUS CATHETER (CVC) WITH SUBCUTANEOUS PORT N/A 12/7/2018    Procedure: [id number];  Surgeon: Luan Diagnostic Provider;  Location: [facility] OR [address] [place] [address];  Service: Radiology;  Laterality: N/A;    SKIN CANCER EXCISION       Family History   Problem Relation Age of Onset    Heart disease Mother     Hypertension Mother     Heart failure Mother     Cataracts Mother     Cancer Father         lung    Diabetes Maternal Aunt     Stroke Sister     Rheum arthritis Paternal Grandmother     Amblyopia Neg Hx     Blindness Neg Hx     Glaucoma Neg Hx     Macular degeneration Neg Hx     Retinal detachment Neg Hx     Strabismus Neg Hx      Social History     Tobacco Use    Smoking status: Never Smoker    Smokeless tobacco: Never Used   Substance Use Topics    Alcohol use: No     Alcohol/week: 0.0 standard drinks     Frequency: Never     Drinks per session: 1 or 2     Binge frequency: Never    Drug use: No     Review of Systems   Constitutional: Positive for fever.   HENT: Negative for sore throat.    Respiratory: Negative for  shortness of breath.    Cardiovascular: Negative for chest pain.   Gastrointestinal: Negative for nausea.   Genitourinary: Negative for dysuria.        Dark colored urine   Musculoskeletal: Negative for back pain.   Skin: Negative for rash.   Neurological: Negative for weakness.   Hematological: Does not bruise/bleed easily.       Physical Exam     Initial Vitals [05/25/20 0324]   BP Pulse Resp Temp SpO2   (!) 168/94 79 18 98.8 °F (37.1 °C) 96 %      MAP       --         Physical Exam    Constitutional: Vital signs are normal. He appears well-developed and well-nourished. He is not diaphoretic. No distress.   HENT:   Head: Normocephalic and atraumatic.   Right Ear: Hearing and external ear normal.   Left Ear: Hearing and external ear normal.   Eyes: Conjunctivae are normal.   Cardiovascular: Normal rate and regular rhythm. Exam reveals no gallop and no friction rub.    No murmur heard.  Abdominal: Soft. Normal appearance and bowel sounds are normal.   Musculoskeletal: Normal range of motion.   Neurological: He is alert and oriented to person, place, and time.   Skin: Skin is warm and intact.   Psychiatric: He has a normal mood and affect. His speech is normal and behavior is normal. Cognition and memory are normal.         ED Course   Procedures  Labs Reviewed   CBC W/ AUTO DIFFERENTIAL - Abnormal; Notable for the following components:       Result Value    RBC 3.51 (*)     Hemoglobin 10.3 (*)     Hematocrit 31.7 (*)     Immature Granulocytes 0.7 (*)     Immature Grans (Abs) 0.05 (*)     Lymph% 17.9 (*)     All other components within normal limits   COMPREHENSIVE METABOLIC PANEL - Abnormal; Notable for the following components:    CO2 21 (*)     Glucose 198 (*)     BUN, Bld 38 (*)     Creatinine 1.7 (*)     Albumin 3.4 (*)     eGFR if  48.9 (*)     eGFR if non  42.3 (*)     All other components within normal limits   CULTURE, BLOOD   CULTURE, BLOOD   URINALYSIS, REFLEX TO URINE  CULTURE   SARS-COV-2 RNA AMPLIFICATION, QUAL          Imaging Results          X-Ray Chest AP Portable (Final result)  Result time 05/25/20 04:33:16    Final result by Seth Suarez MD (05/25/20 04:33:16)                 Impression:      No acute findings.    No significant change from prior study.      Electronically signed by: Seth Suarez MD  Date:    05/25/2020  Time:    04:33             Narrative:    EXAMINATION:  XR CHEST AP PORTABLE    CLINICAL HISTORY:  Shortness of breath    TECHNIQUE:  Single frontal view of the chest was performed.    COMPARISON:  March 9, 2020.    FINDINGS:  Right-sided port catheter appears unchanged.    Heart and lungs  appear unchanged when allowing for differences in technique and positioning.                                 Medical Decision Making:   History:   Old Medical Records: I decided to obtain old medical records.  Old Records Summarized: records from previous admission(s).  Initial Assessment:   60-year-old male with extensive past medical history presenting for subjective fever and dark urine  Differential Diagnosis:   Covid-19, UTI, pyelonephritis, pneumonia  Independently Interpreted Test(s):   I have ordered and independently interpreted X-rays - see summary below.       <> Summary of X-Ray Reading(s): No acute pneumonia  I have ordered and independently interpreted EKG Reading(s) - see summary below       <> Summary of EKG Reading(s): Normal sinus rhythm, no STEMI  Clinical Tests:   Lab Tests: Ordered and Reviewed  Radiological Study: Ordered and Reviewed  Medical Tests: Ordered and Reviewed  ED Management:  VSS here, pt afebrile  Plan:   Chest x-ray, blood cultures, labs, urinalysis  No acute findings on labs, chest x-ray clear  No acute findings on urinalysis, Covid-19 swab negative  Blood cultures have been drawn.  Patient's vital signs have remained stable and he is afebrile.  Plan to discharge the patient home with close follow-up with his PCP strict  return precautions have been given.  Other:   I discussed test(s) with the performing physician.  I have discussed this case with another health care provider.                                 Clinical Impression:       ICD-10-CM ICD-9-CM   1. Viral syndrome B34.9 079.99   2. SOB (shortness of breath) R06.02 786.05         Disposition:   Disposition: Discharged  Condition: Stable                        William Lobo PA-C  05/25/20 0515

## 2020-05-25 NOTE — ED NOTES
Adult Physical Assessment  LOC: Bessy Nunez, 60 y.o. male verified via two identifiers.  The patient is awake, alert, oriented and speaking appropriately at this time.  APPEARANCE: Patient resting comfortably and appears to be in no acute distress at this time. Patient is clean and well groomed, patient's clothing is properly fastened.  SKIN:The skin is warm and dry, color consistent with ethnicity, patient has normal skin turgor and moist mucus membranes, skin intact, no breakdown or brusing noted.  MUSCULOSKELETAL: Patient moving all extremities well, no obvious swelling or deformities noted.  RESPIRATORY: Airway is open and patent, respirations are spontaneous, patient has a normal effort and rate, no accessory muscle use noted.  CARDIAC: Patient has a normal rate and rhythm, no periphreal edema noted in any extremity, capillary refill < 3 seconds in all extremities  ABDOMEN: Soft and non tender to palpation, no abdominal distention noted. Bowel sounds present in all four quadrants.  NEUROLOGIC: Eyes open spontaneously, behavior appropriate to situation, follows commands, facial expression symmetrical, bilateral hand grasp equal and even, purposeful motor response noted, normal sensation in all extremities when touched with a finger.

## 2020-05-25 NOTE — ED TRIAGE NOTES
"Bessy Nunez, a 60 y.o. male presents to the ED via POV w/ complaint of "feeling hot". Pt states "my wife said I needed to come to the ER that I feel hot and thinks im septic". Pt without complaints upon arrival. Pt AAOx4.     Triage note:  Chief Complaint   Patient presents with    Fever     Wife states, "I feel like he's septic, he has all the symptoms." (subjective fevers and dark urine per wife). Hx of dementia/Vasculitis. AAOx4 at this time.     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 Love By mri.  Several active lesions, many old. 5/13: MRA brain/neck, MRI brain w/wo contrast show no vascular occlusion, multiple foci of hyperintensity in deep cerebral periventricular white matter in pattern of demyelinating process.  5/17: MRI spine performed, no spinal cord lesions. Hospitalization 6/2017. EEG was performed negative for seizures.  Repeat MRI showing new lesion, question whether this was demyelination versus CVA. 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 Findings compatible with a small acute lacunar infarct adjacent to the left frontal horn.  Nonspecific enhancement just inferior to this region and extending into the left basal ganglia, as well as within the inferior aspect of the left cerebellum.  No evidence of a focal mass. 2.  Extensive increased signal intensity involving the periventricular white matter compatible with demyelinating disease versus vasculitis. 3.  Clinical correlation and followup recommended. 4.  This reportedly flattened in the EPIC and the corpus callosum medical record system.    Mixed hyperlipidemia 11/9/2012    NAFLD " (nonalcoholic fatty liver disease) 10/11/2013    CHASE (nonalcoholic steatohepatitis)     Obesity     Stroke     Type 2 diabetes mellitus with diabetic polyneuropathy, with long-term current use of insulin 5/14/2017    Type 2 diabetes mellitus with hyperglycemia, with long-term current use of insulin 10/11/2013

## 2020-05-30 LAB
BACTERIA BLD CULT: NORMAL
BACTERIA BLD CULT: NORMAL

## 2020-06-03 ENCOUNTER — TELEPHONE (OUTPATIENT)
Dept: SLEEP MEDICINE | Facility: OTHER | Age: 60
End: 2020-06-03

## 2020-06-04 ENCOUNTER — TELEPHONE (OUTPATIENT)
Dept: FAMILY MEDICINE | Facility: CLINIC | Age: 60
End: 2020-06-04

## 2020-06-23 ENCOUNTER — APPOINTMENT (OUTPATIENT)
Dept: RADIOLOGY | Facility: OTHER | Age: 60
End: 2020-06-23
Attending: PODIATRIST
Payer: MEDICARE

## 2020-06-23 ENCOUNTER — OFFICE VISIT (OUTPATIENT)
Dept: PODIATRY | Facility: CLINIC | Age: 60
End: 2020-06-23
Payer: MEDICARE

## 2020-06-23 ENCOUNTER — TELEPHONE (OUTPATIENT)
Dept: PODIATRY | Facility: CLINIC | Age: 60
End: 2020-06-23

## 2020-06-23 VITALS
HEIGHT: 71 IN | HEART RATE: 77 BPM | WEIGHT: 205 LBS | DIASTOLIC BLOOD PRESSURE: 63 MMHG | BODY MASS INDEX: 28.7 KG/M2 | SYSTOLIC BLOOD PRESSURE: 109 MMHG

## 2020-06-23 DIAGNOSIS — Z79.4 TYPE 2 DIABETES MELLITUS WITH DIABETIC POLYNEUROPATHY, WITH LONG-TERM CURRENT USE OF INSULIN: ICD-10-CM

## 2020-06-23 DIAGNOSIS — L97.519 ULCER OF GREAT TOE, RIGHT, WITH UNSPECIFIED SEVERITY: Primary | ICD-10-CM

## 2020-06-23 DIAGNOSIS — E11.42 TYPE 2 DIABETES MELLITUS WITH DIABETIC POLYNEUROPATHY, WITH LONG-TERM CURRENT USE OF INSULIN: ICD-10-CM

## 2020-06-23 DIAGNOSIS — L97.519 ULCER OF GREAT TOE, RIGHT, WITH UNSPECIFIED SEVERITY: ICD-10-CM

## 2020-06-23 PROCEDURE — 99213 OFFICE O/P EST LOW 20 MIN: CPT | Mod: S$GLB,,, | Performed by: PODIATRIST

## 2020-06-23 PROCEDURE — 3044F PR MOST RECENT HEMOGLOBIN A1C LEVEL <7.0%: ICD-10-PCS | Mod: CPTII,S$GLB,, | Performed by: PODIATRIST

## 2020-06-23 PROCEDURE — 73630 XR FOOT COMPLETE 3 VIEW RIGHT: ICD-10-PCS | Mod: 26,RT,, | Performed by: RADIOLOGY

## 2020-06-23 PROCEDURE — 99213 PR OFFICE/OUTPT VISIT, EST, LEVL III, 20-29 MIN: ICD-10-PCS | Mod: S$GLB,,, | Performed by: PODIATRIST

## 2020-06-23 PROCEDURE — 99499 UNLISTED E&M SERVICE: CPT | Mod: S$GLB,,, | Performed by: PODIATRIST

## 2020-06-23 PROCEDURE — 73630 X-RAY EXAM OF FOOT: CPT | Mod: 26,RT,, | Performed by: RADIOLOGY

## 2020-06-23 PROCEDURE — 3008F PR BODY MASS INDEX (BMI) DOCUMENTED: ICD-10-PCS | Mod: CPTII,S$GLB,, | Performed by: PODIATRIST

## 2020-06-23 PROCEDURE — 3074F PR MOST RECENT SYSTOLIC BLOOD PRESSURE < 130 MM HG: ICD-10-PCS | Mod: CPTII,S$GLB,, | Performed by: PODIATRIST

## 2020-06-23 PROCEDURE — 99499 RISK ADDL DX/OHS AUDIT: ICD-10-PCS | Mod: S$GLB,,, | Performed by: PODIATRIST

## 2020-06-23 PROCEDURE — 3074F SYST BP LT 130 MM HG: CPT | Mod: CPTII,S$GLB,, | Performed by: PODIATRIST

## 2020-06-23 PROCEDURE — 99999 PR PBB SHADOW E&M-EST. PATIENT-LVL V: CPT | Mod: PBBFAC,,, | Performed by: PODIATRIST

## 2020-06-23 PROCEDURE — 3078F DIAST BP <80 MM HG: CPT | Mod: CPTII,S$GLB,, | Performed by: PODIATRIST

## 2020-06-23 PROCEDURE — 73630 X-RAY EXAM OF FOOT: CPT | Mod: TC,RT

## 2020-06-23 PROCEDURE — 3044F HG A1C LEVEL LT 7.0%: CPT | Mod: CPTII,S$GLB,, | Performed by: PODIATRIST

## 2020-06-23 PROCEDURE — 3078F PR MOST RECENT DIASTOLIC BLOOD PRESSURE < 80 MM HG: ICD-10-PCS | Mod: CPTII,S$GLB,, | Performed by: PODIATRIST

## 2020-06-23 PROCEDURE — 3008F BODY MASS INDEX DOCD: CPT | Mod: CPTII,S$GLB,, | Performed by: PODIATRIST

## 2020-06-23 PROCEDURE — 99999 PR PBB SHADOW E&M-EST. PATIENT-LVL V: ICD-10-PCS | Mod: PBBFAC,,, | Performed by: PODIATRIST

## 2020-06-23 NOTE — PROGRESS NOTES
Subjective:      Patient ID: Bessy Nunez is a 60 y.o. male.    Chief Complaint: Toe Injury (Right 4th digit)    Dark color right 4th toe by toenail.  Gradual onset, worsening over past several days, aggravated by increased weight bearing, shoe gear, pressure.  No previous medical treatment.  No self treatment.  Denies trauma, surgery.     Review of Systems   Constitution: Negative for chills, diaphoresis, fever, malaise/fatigue and night sweats.   Cardiovascular: Negative for claudication, cyanosis, leg swelling and syncope.   Skin: Positive for nail changes and poor wound healing. Negative for color change, dry skin, rash, suspicious lesions and unusual hair distribution.   Musculoskeletal: Negative for falls, joint pain, joint swelling, muscle cramps, muscle weakness and stiffness.   Gastrointestinal: Negative for constipation, diarrhea, nausea and vomiting.   Neurological: Positive for sensory change. Negative for brief paralysis, disturbances in coordination, focal weakness, numbness, paresthesias and tremors.           Objective:      Physical Exam  Constitutional:       General: He is not in acute distress.     Appearance: He is well-developed. He is not diaphoretic.   Cardiovascular:      Pulses:           Dorsalis pedis pulses are 2+ on the right side and 2+ on the left side.        Posterior tibial pulses are 1+ on the right side and 1+ on the left side.      Comments: Toes warm, pink, cap fill <5 seconds.  Musculoskeletal:      Comments: Normal angle, base, station of gait. All ten toes without clubbing, cyanosis, or signs of ischemia.  No pain to palpation bilateral lower extremities.  Range of motion, stability, muscle strength, and muscle tone normal bilateral feet and legs.    Lymphadenopathy:      Comments: Negative lymphadenopathy bilateral popliteal fossa and tarsal tunnel.     Skin:     General: Skin is warm and dry.      Coloration: Skin is not pale.      Findings: No abrasion, bruising,  burn, ecchymosis, erythema, laceration, lesion or rash.      Comments:     Wound: dorsal dipj right 4th    Size:    Pre:0x0x0 - dark dried blister/eschar      Base:  Black eschar with no drainage.  No pus, tracking, fluctuance, malodor, or cardinal signs infection.    Borders:   flat, pink, blanchable skin edges without undermining.       Neurological:      Comments: Diminished/loss of protective sensation all toes bilateral to 10 gram monofilament.     Psychiatric:         Behavior: Behavior is cooperative.               Assessment:       Encounter Diagnoses   Name Primary?    Ulcer of great toe, right, with unspecified severity Yes    Type 2 diabetes mellitus with diabetic polyneuropathy, with long-term current use of insulin          Plan:       Bessy was seen today for toe injury.    Diagnoses and all orders for this visit:    Ulcer of great toe, right, with unspecified severity  -     X-Ray Foot Complete Right; Future  -     DIABETIC SHOES FOR HOME USE    Type 2 diabetes mellitus with diabetic polyneuropathy, with long-term current use of insulin  -     X-Ray Foot Complete Right; Future  -     DIABETIC SHOES FOR HOME USE      I counseled the patient on his conditions, their implications and medical management.    Xray right - no osteolysis 4th toe dipj noted.    Dressed wound with alcohol swab, band aid dressing - change same daily.    Dispense open toe sx shoe right - ambulate minimally in same with casual shoe left.              Follow up in about 1 week (around 6/30/2020) for right 4th toe ulcer.

## 2020-06-24 ENCOUNTER — NURSE TRIAGE (OUTPATIENT)
Dept: ADMINISTRATIVE | Facility: CLINIC | Age: 60
End: 2020-06-24

## 2020-06-24 NOTE — TELEPHONE ENCOUNTER
Spoke with patient's wife Bekah she states that patient has loss control of his bowels and bladder.  States that patient has a sever case of diarrhea stool is black in color.  Advised Bekah to bring patient to ER for further evaluation. Bekah states that she is going to see if she can make patient go to ER.  Expressed urgency to Ms. Godfrey in having patient seen.  Bekah verbalized understanding.    Reason for Disposition   Bloody, black, or tarry bowel movements    Additional Information   Negative: Shock suspected (e.g., cold/pale/clammy skin, too weak to stand, low BP, rapid pulse)   Negative: Difficult to awaken or acting confused (e.g., disoriented, slurred speech)   Negative: Sounds like a life-threatening emergency to the triager   Negative: SEVERE abdominal pain (e.g., excruciating) and present > 1 hour   Negative: SEVERE abdominal pain and age > 60    Protocols used: DIARRHEA-A-OH

## 2020-06-24 NOTE — TELEPHONE ENCOUNTER
Spoke with pt's wife informed her of message below she states pt doesn't want to go to hospital so she won't be bringing him.Informed her that's what was recommended by provider.

## 2020-06-24 NOTE — TELEPHONE ENCOUNTER
Agree with recommendations - if it is black colored diarrhea need to make sure this is not blood.  ER.

## 2020-06-29 ENCOUNTER — PATIENT OUTREACH (OUTPATIENT)
Dept: ADMINISTRATIVE | Facility: OTHER | Age: 60
End: 2020-06-29

## 2020-06-29 NOTE — PROGRESS NOTES
Chart reviewed.   Immunizations: Triggered Imm Registry     Orders placed: n/a  Upcoming appts to satisfy EMERY topics: n/a

## 2020-06-30 ENCOUNTER — OFFICE VISIT (OUTPATIENT)
Dept: PODIATRY | Facility: CLINIC | Age: 60
End: 2020-06-30
Payer: MEDICARE

## 2020-06-30 VITALS
TEMPERATURE: 98 F | HEART RATE: 74 BPM | WEIGHT: 205 LBS | BODY MASS INDEX: 28.7 KG/M2 | DIASTOLIC BLOOD PRESSURE: 67 MMHG | SYSTOLIC BLOOD PRESSURE: 138 MMHG | HEIGHT: 71 IN

## 2020-06-30 DIAGNOSIS — E11.42 TYPE 2 DIABETES MELLITUS WITH DIABETIC POLYNEUROPATHY, WITH LONG-TERM CURRENT USE OF INSULIN: ICD-10-CM

## 2020-06-30 DIAGNOSIS — Z79.4 TYPE 2 DIABETES MELLITUS WITH DIABETIC POLYNEUROPATHY, WITH LONG-TERM CURRENT USE OF INSULIN: ICD-10-CM

## 2020-06-30 DIAGNOSIS — Z87.898 HISTORY OF ULCERATION: Primary | ICD-10-CM

## 2020-06-30 PROCEDURE — 99999 PR PBB SHADOW E&M-EST. PATIENT-LVL V: CPT | Mod: PBBFAC,,, | Performed by: PODIATRIST

## 2020-06-30 PROCEDURE — 99499 RISK ADDL DX/OHS AUDIT: ICD-10-PCS | Mod: S$GLB,,, | Performed by: PODIATRIST

## 2020-06-30 PROCEDURE — 3044F PR MOST RECENT HEMOGLOBIN A1C LEVEL <7.0%: ICD-10-PCS | Mod: CPTII,S$GLB,, | Performed by: PODIATRIST

## 2020-06-30 PROCEDURE — 3078F DIAST BP <80 MM HG: CPT | Mod: CPTII,S$GLB,, | Performed by: PODIATRIST

## 2020-06-30 PROCEDURE — 3075F PR MOST RECENT SYSTOLIC BLOOD PRESS GE 130-139MM HG: ICD-10-PCS | Mod: CPTII,S$GLB,, | Performed by: PODIATRIST

## 2020-06-30 PROCEDURE — 3078F PR MOST RECENT DIASTOLIC BLOOD PRESSURE < 80 MM HG: ICD-10-PCS | Mod: CPTII,S$GLB,, | Performed by: PODIATRIST

## 2020-06-30 PROCEDURE — 3008F PR BODY MASS INDEX (BMI) DOCUMENTED: ICD-10-PCS | Mod: CPTII,S$GLB,, | Performed by: PODIATRIST

## 2020-06-30 PROCEDURE — 3008F BODY MASS INDEX DOCD: CPT | Mod: CPTII,S$GLB,, | Performed by: PODIATRIST

## 2020-06-30 PROCEDURE — 3044F HG A1C LEVEL LT 7.0%: CPT | Mod: CPTII,S$GLB,, | Performed by: PODIATRIST

## 2020-06-30 PROCEDURE — 99212 OFFICE O/P EST SF 10 MIN: CPT | Mod: S$GLB,,, | Performed by: PODIATRIST

## 2020-06-30 PROCEDURE — 99499 UNLISTED E&M SERVICE: CPT | Mod: S$GLB,,, | Performed by: PODIATRIST

## 2020-06-30 PROCEDURE — 99999 PR PBB SHADOW E&M-EST. PATIENT-LVL V: ICD-10-PCS | Mod: PBBFAC,,, | Performed by: PODIATRIST

## 2020-06-30 PROCEDURE — 99212 PR OFFICE/OUTPT VISIT, EST, LEVL II, 10-19 MIN: ICD-10-PCS | Mod: S$GLB,,, | Performed by: PODIATRIST

## 2020-06-30 PROCEDURE — 3075F SYST BP GE 130 - 139MM HG: CPT | Mod: CPTII,S$GLB,, | Performed by: PODIATRIST

## 2020-06-30 NOTE — PROGRESS NOTES
Subjective:      Patient ID: Bessy Nunez is a 60 y.o. male.    Chief Complaint: Foot Ulcer (RIGHT great toe)    Dark color right 4th toe by toenail.  Gradual onset, improved well over past several days, aggravated by increased weight bearing, shoe gear, pressure.  Dressings and protective shoes helping a lot.  No self treatment.  Denies trauma, surgery.     Review of Systems   Constitution: Negative for chills, diaphoresis, fever, malaise/fatigue and night sweats.   Cardiovascular: Negative for claudication, cyanosis, leg swelling and syncope.   Skin: Positive for nail changes and poor wound healing. Negative for color change, dry skin, rash, suspicious lesions and unusual hair distribution.   Musculoskeletal: Negative for falls, joint pain, joint swelling, muscle cramps, muscle weakness and stiffness.   Gastrointestinal: Negative for constipation, diarrhea, nausea and vomiting.   Neurological: Positive for sensory change. Negative for brief paralysis, disturbances in coordination, focal weakness, numbness, paresthesias and tremors.           Objective:      Physical Exam  Constitutional:       General: He is not in acute distress.     Appearance: He is well-developed. He is not diaphoretic.   Cardiovascular:      Pulses:           Dorsalis pedis pulses are 2+ on the right side and 2+ on the left side.        Posterior tibial pulses are 1+ on the right side and 1+ on the left side.      Comments: Toes warm, pink, cap fill <5 seconds.  Musculoskeletal:      Comments: Normal angle, base, station of gait. All ten toes without clubbing, cyanosis, or signs of ischemia.  No pain to palpation bilateral lower extremities.  Range of motion, stability, muscle strength, and muscle tone normal bilateral feet and legs.    Lymphadenopathy:      Comments: Negative lymphadenopathy bilateral popliteal fossa and tarsal tunnel.     Skin:     General: Skin is warm and dry.      Coloration: Skin is not pale.      Findings: No  abrasion, bruising, burn, ecchymosis, erythema, laceration, lesion or rash.      Comments: Wound distal dorsal dipj right 4th toe closed today, epithelialized  without ulceration, drainage, pus, tracking, fluctuance, malodor, or cardinal signs infection.       Neurological:      Sensory: Sensory deficit present.      Comments: Diminished/loss of protective sensation all toes bilateral to 10 gram monofilament.     Psychiatric:         Behavior: Behavior is cooperative.               Assessment:       Encounter Diagnoses   Name Primary?    History of ulceration Yes    Type 2 diabetes mellitus with diabetic polyneuropathy, with long-term current use of insulin          Plan:       Bessy was seen today for foot ulcer.    Diagnoses and all orders for this visit:    History of ulceration    Type 2 diabetes mellitus with diabetic polyneuropathy, with long-term current use of insulin      I counseled the patient on his conditions, their implications and medical management.    Xray right - no osteolysis 4th toe dipj noted.    No dressing needed.    Inspect feet multiple times daily for signs of occurrence/recurrence ulceration.      Dispense open toe sx shoe right - ambulate minimally in same with casual shoe left.  May gradually transition back to DM shoes, inserts.    Rx DM shoes inserts.              Follow up if symptoms worsen or fail to improve.

## 2020-07-02 ENCOUNTER — TELEPHONE (OUTPATIENT)
Dept: SLEEP MEDICINE | Facility: OTHER | Age: 60
End: 2020-07-02

## 2020-07-08 ENCOUNTER — DOCUMENTATION ONLY (OUTPATIENT)
Dept: FAMILY MEDICINE | Facility: CLINIC | Age: 60
End: 2020-07-08

## 2020-07-08 ENCOUNTER — TELEPHONE (OUTPATIENT)
Dept: SLEEP MEDICINE | Facility: OTHER | Age: 60
End: 2020-07-08

## 2020-07-08 DIAGNOSIS — G47.33 OSA (OBSTRUCTIVE SLEEP APNEA): Primary | ICD-10-CM

## 2020-07-22 ENCOUNTER — TELEPHONE (OUTPATIENT)
Dept: NEUROLOGY | Facility: CLINIC | Age: 60
End: 2020-07-22

## 2020-07-22 ENCOUNTER — DOCUMENTATION ONLY (OUTPATIENT)
Dept: NEUROLOGY | Facility: CLINIC | Age: 60
End: 2020-07-22

## 2020-07-22 ENCOUNTER — OFFICE VISIT (OUTPATIENT)
Dept: NEUROLOGY | Facility: CLINIC | Age: 60
End: 2020-07-22
Payer: MEDICARE

## 2020-07-22 VITALS
TEMPERATURE: 98 F | HEART RATE: 70 BPM | SYSTOLIC BLOOD PRESSURE: 122 MMHG | HEIGHT: 70 IN | WEIGHT: 219.69 LBS | DIASTOLIC BLOOD PRESSURE: 72 MMHG | BODY MASS INDEX: 31.45 KG/M2

## 2020-07-22 DIAGNOSIS — I77.6 CNS VASCULITIS: Primary | ICD-10-CM

## 2020-07-22 DIAGNOSIS — Z29.89 PROPHYLACTIC IMMUNOTHERAPY: ICD-10-CM

## 2020-07-22 DIAGNOSIS — Z71.89 COUNSELING REGARDING GOALS OF CARE: ICD-10-CM

## 2020-07-22 DIAGNOSIS — R41.89 COGNITIVE IMPAIRMENT: ICD-10-CM

## 2020-07-22 DIAGNOSIS — Z79.899 HIGH RISK MEDICATION USE: ICD-10-CM

## 2020-07-22 DIAGNOSIS — R53.83 FATIGUE, UNSPECIFIED TYPE: ICD-10-CM

## 2020-07-22 DIAGNOSIS — R35.0 URINARY FREQUENCY: ICD-10-CM

## 2020-07-22 LAB
BACTERIA #/AREA URNS AUTO: ABNORMAL /HPF
BILIRUB UR QL STRIP: NEGATIVE
CLARITY UR REFRACT.AUTO: CLEAR
COLOR UR AUTO: YELLOW
GLUCOSE UR QL STRIP: NEGATIVE
HGB UR QL STRIP: ABNORMAL
HYALINE CASTS UR QL AUTO: 4 /LPF
KETONES UR QL STRIP: NEGATIVE
LEUKOCYTE ESTERASE UR QL STRIP: NEGATIVE
MICROSCOPIC COMMENT: ABNORMAL
NITRITE UR QL STRIP: NEGATIVE
PH UR STRIP: 5 [PH] (ref 5–8)
PROT UR QL STRIP: ABNORMAL
RBC #/AREA URNS AUTO: 0 /HPF (ref 0–4)
SP GR UR STRIP: 1.02 (ref 1–1.03)
URN SPEC COLLECT METH UR: ABNORMAL
WBC #/AREA URNS AUTO: 1 /HPF (ref 0–5)

## 2020-07-22 PROCEDURE — 99999 PR PBB SHADOW E&M-EST. PATIENT-LVL III: CPT | Mod: PBBFAC,,, | Performed by: CLINICAL NURSE SPECIALIST

## 2020-07-22 PROCEDURE — 81001 URINALYSIS AUTO W/SCOPE: CPT

## 2020-07-22 PROCEDURE — 99215 PR OFFICE/OUTPT VISIT, EST, LEVL V, 40-54 MIN: ICD-10-PCS | Mod: S$GLB,,, | Performed by: CLINICAL NURSE SPECIALIST

## 2020-07-22 PROCEDURE — 99999 PR PBB SHADOW E&M-EST. PATIENT-LVL III: ICD-10-PCS | Mod: PBBFAC,,, | Performed by: CLINICAL NURSE SPECIALIST

## 2020-07-22 PROCEDURE — 3078F PR MOST RECENT DIASTOLIC BLOOD PRESSURE < 80 MM HG: ICD-10-PCS | Mod: CPTII,S$GLB,, | Performed by: CLINICAL NURSE SPECIALIST

## 2020-07-22 PROCEDURE — 3074F PR MOST RECENT SYSTOLIC BLOOD PRESSURE < 130 MM HG: ICD-10-PCS | Mod: CPTII,S$GLB,, | Performed by: CLINICAL NURSE SPECIALIST

## 2020-07-22 PROCEDURE — 3074F SYST BP LT 130 MM HG: CPT | Mod: CPTII,S$GLB,, | Performed by: CLINICAL NURSE SPECIALIST

## 2020-07-22 PROCEDURE — 3008F PR BODY MASS INDEX (BMI) DOCUMENTED: ICD-10-PCS | Mod: CPTII,S$GLB,, | Performed by: CLINICAL NURSE SPECIALIST

## 2020-07-22 PROCEDURE — 99215 OFFICE O/P EST HI 40 MIN: CPT | Mod: S$GLB,,, | Performed by: CLINICAL NURSE SPECIALIST

## 2020-07-22 PROCEDURE — 3008F BODY MASS INDEX DOCD: CPT | Mod: CPTII,S$GLB,, | Performed by: CLINICAL NURSE SPECIALIST

## 2020-07-22 PROCEDURE — 3078F DIAST BP <80 MM HG: CPT | Mod: CPTII,S$GLB,, | Performed by: CLINICAL NURSE SPECIALIST

## 2020-07-22 RX ORDER — METHYLPHENIDATE HYDROCHLORIDE 10 MG/1
10 TABLET ORAL 2 TIMES DAILY WITH MEALS
Qty: 60 TABLET | Refills: 0 | Status: SHIPPED | OUTPATIENT
Start: 2020-08-22 | End: 2020-07-24 | Stop reason: SDUPTHER

## 2020-07-22 RX ORDER — METHYLPHENIDATE HYDROCHLORIDE 10 MG/1
10 TABLET ORAL 2 TIMES DAILY WITH MEALS
Qty: 60 TABLET | Refills: 0 | Status: SHIPPED | OUTPATIENT
Start: 2020-07-22 | End: 2020-10-19 | Stop reason: SDUPTHER

## 2020-07-22 RX ORDER — METHYLPHENIDATE HYDROCHLORIDE 10 MG/1
10 TABLET ORAL 2 TIMES DAILY WITH MEALS
Qty: 60 TABLET | Refills: 0 | Status: SHIPPED | OUTPATIENT
Start: 2020-09-22 | End: 2020-07-24 | Stop reason: SDUPTHER

## 2020-07-22 NOTE — PROGRESS NOTES
"Subjective:          Patient ID: Bessy Nunez is a 60 y.o. male who presents today for a routine clinic visit for CNS vasculitis. He was last seen in a virtual visit in April 2020.  He is accompanied by his wife.      HPI:  · DMT: rituximab every 16 weeks--last on 5/14   · Side effects from DMT? Yes - headache  · Taking vitamin D3 as recommended? Yes -  Dose: 5000 units daily   · His wife reports that she does not notice any improvement after Rituxan like she saw with Cytoxan.   · He has had diarrhea for the last several months, at least once, but sometimes multiple times a day. He denies any cramping. He has been wearing Depends. He had a colonoscopy on Monday.   · He has not been exercising.   · His wife has hired an aide from [employer] to help care for Bessy. She pays $900 a month for this, but his wife reports that he has not been cooperating. They do light housework, cook, and play games with Bessy.   · His wife states "he doesn't like to bathe, and he doesn't like to take his medicines.   · He will be having a sleep study next week.     Medications:  Current Outpatient Medications   Medication Sig    alendronate (FOSAMAX) 70 MG tablet Take 1 tablet (70 mg total) by mouth every 7 days. (Patient taking differently: Take 70 mg by mouth every 7 days. Tuesday)    aspirin (ECOTRIN) 81 MG EC tablet Take 81 mg by mouth once daily.    atenolol (TENORMIN) 50 MG tablet Take 1 tablet (50 mg total) by mouth once daily.    atorvastatin (LIPITOR) 40 MG tablet TAKE 1 TABLET BY MOUTH ONCE DAILY    blood sugar diagnostic (TRUE METRIX GLUCOSE TEST STRIP) Strp Test blood sugar four times a day    diltiaZEM (DILT-XR) 240 MG CDCR Take 1 capsule (240 mg total) by mouth once daily.    donepezil (ARICEPT) 5 MG tablet Take 1 tablet (5 mg total) by mouth every evening.    FREESTYLE ARELY 14 DAY READER Misc GLUCOSE TESTING : FASTING AND PRIOR TO MEALS    FREESTYLE ARELY 14 DAY SENSOR Kit GLUCOSE TESTING 4 TIMES A DAY    " "insulin (BASAGLAR KWIKPEN U-100 INSULIN) glargine 100 units/mL (3mL) SubQ pen Inject 25 Units into the skin once daily. Up to 60 BID with cytoxan    insulin aspart U-100 (NOVOLOG) 100 unit/mL (3 mL) InPn pen Inject 1-10 Units into the skin before meals and at bedtime as needed (Hyperglycemia).    irbesartan (AVAPRO) 300 MG tablet TAKE 1 TABLET BY MOUTH ONCE DAILY IN THE EVENING    lancets 30 gauge Misc Test blood sugar four times a day    liraglutide 0.6 mg/0.1 mL, 18 mg/3 mL, subq PNIJ (VICTOZA 3-TOMASZ) 0.6 mg/0.1 mL (18 mg/3 mL) PnIj Inject 1.8 mg into the skin once daily.    methylphenidate HCl (RITALIN) 10 MG tablet Take 1 tablet (10 mg total) by mouth 2 (two) times daily with meals.    omega-3 fatty acids-vitamin E (FISH OIL) 1,000 mg Cap Take 1 capsule by mouth 2 (two) times daily.    pen needle, diabetic (BD ULTRA-FINE ANA PEN NEEDLE) 32 gauge x 5/32" Ndle 4x daily insulin pen needle, relion    sertraline (ZOLOFT) 100 MG tablet 1.5 tablet daily (Patient taking differently: Take 150 mg by mouth once daily. )    vitamin D 1000 units Tab Take 5 tablets (5,000 Units total) by mouth once daily.    metFORMIN (GLUCOPHAGE-XR) 500 MG XR 24hr tablet TAKE 2 TABLETS BY MOUTH TWICE DAILY WITH MEALS (Patient not taking: Reported on 7/22/2020)    QUEtiapine (SEROQUEL) 25 MG Tab Take 1 tablet (25 mg total) by mouth every evening.     SOCIAL HISTORY  Social History     Tobacco Use    Smoking status: Never Smoker    Smokeless tobacco: Never Used   Substance Use Topics    Alcohol use: Not Currently     Alcohol/week: 0.0 standard drinks     Frequency: Never     Drinks per session: 1 or 2     Binge frequency: Never    Drug use: No       Living arrangements - the patient lives with his spouse.      ROS:  · Fatigue: Yes - He feels like the Ritalin helps him. He is able to stay up and watch TV, and he feels that Ritalin helps him to think better.   · Sleep Disturbance: Yes--sleeps a lot   · Bladder Dysfunction: " Yes--denies any recent UTIs; he urinates a lot throughout the day and night, but he has been drinking more water than usual. His wife reports that he does have an odor to the urine.   · Bowel Dysfunction: As above  · Spasticity: No  · Visual Symptoms: No  · Cognitive: Yes - He thinks his memory is fine, but his wife disagrees and states that he has some short-term memory problems   · Mood Disorder: No--he denies depression  · Gait Disturbance: Yes - he endorses some balance problems, stumbles a lot   · Falls: No  · Hand Dysfunction: No  · Pain: No  · Sexual Dysfunction: Not Assessed  · Skin Breakdown: No  · Tremors: No  · Dysphagia:  No; gags sometimes with medication   · Dysarthria:  No  · Heat sensitivity:  Heat makes him feel weak; tries to avoid the heat   · Any un-met adaptive needs? No  · Copay Assist?  $2000 out of pocket for Rituxan           Objective:        1. 25 foot timed walk: 8.7 seconds today without assist    Neurologic Exam    MENTAL STATUS: docile affect; participates a bit in history and discussion of symptoms today;  language is generally fluent. He is aware of the month, year, president, his location, and my name today.     CRANIAL NERVE EXAM:  There is no JAYDEN.  Extraocular muscles are intact. Pupils are equal, round, and reactive to light. No facial asymmetry. Facial sensation is intact bilaterally. There is no dysarthria. Uvula is midline, and palate moves symmetrically. Shoulder shrug intact bilaterlly. Tongue protrusion is midline. Hearing is grossly intact. Neck is supple.      MOTOR EXAM: Normal bulk and tone throughout UE and LE bilaterally.   Rapid sequential movements are normal; Strength is 5/5 in all groups in the lower extremities and upper extremities;     REFLEXES: 2+ and symmetric throughout in all four extremeiies; toes are down bilaterally     GAIT: decrease arm swing bilaterally  *Some difficulty standing from seated position and sitting back down   Imaging:       Results for  orders placed during the hospital encounter of 04/15/19   MRI Brain Demyelinating W W/O Contrast    Impression Remote microvascular ischemic changes supratentorial and infratentorial with areas suggesting amyloid angiopathy.    Incidental enhancing tiny venous angioma right parietal lobe.    findings in the cerebral white matter which are not typical for multiple sclerosis.  No new or enhancing lesions to suggest active disease.      Electronically signed by: Wing Galo MD  Date:    04/15/2019  Time:    11:32       Labs:     Lab Results   Component Value Date    UMQXVIGW13BJ 47 03/04/2019    FNBGQUMO75JC 36 08/15/2018    RDMGGTDO17KM 11 (L) 05/17/2017     Lab Results   Component Value Date    CA7FVNIJ 87.9 (H) 08/15/2018    ABSOLUTECD3 1271 08/15/2018    NU7LSTPX 22.6 08/15/2018    ABSOLUTECD8 327 08/15/2018    ZL6YVBKI 61.7 (H) 08/15/2018    ABSOLUTECD4 892 08/15/2018    LABCD48 2.73 08/15/2018     Lab Results   Component Value Date    WBC 7.10 05/25/2020    RBC 3.51 (L) 05/25/2020    HGB 10.3 (L) 05/25/2020    HCT 31.7 (L) 05/25/2020    MCV 90 05/25/2020    MCH 29.3 05/25/2020    MCHC 32.5 05/25/2020    RDW 14.3 05/25/2020     05/25/2020    MPV 10.3 05/25/2020    GRAN 4.7 05/25/2020    GRAN 66.1 05/25/2020    LYMPH 1.3 05/25/2020    LYMPH 17.9 (L) 05/25/2020    MONO 0.9 05/25/2020    MONO 13.2 05/25/2020    EOS 0.1 05/25/2020    BASO 0.03 05/25/2020    EOSINOPHIL 1.7 05/25/2020    BASOPHIL 0.4 05/25/2020     Sodium   Date Value Ref Range Status   05/25/2020 140 136 - 145 mmol/L Final     Potassium   Date Value Ref Range Status   05/25/2020 5.0 3.5 - 5.1 mmol/L Final     Comment:     *Result may be interfered by visible hemolysis     Chloride   Date Value Ref Range Status   05/25/2020 110 95 - 110 mmol/L Final     CO2   Date Value Ref Range Status   05/25/2020 21 (L) 23 - 29 mmol/L Final     Glucose   Date Value Ref Range Status   05/25/2020 198 (H) 70 - 110 mg/dL Final     BUN, Bld   Date Value Ref  Range Status   05/25/2020 38 (H) 8 - 23 mg/dL Final     Creatinine   Date Value Ref Range Status   05/25/2020 1.7 (H) 0.5 - 1.4 mg/dL Final     Calcium   Date Value Ref Range Status   05/25/2020 8.9 8.7 - 10.5 mg/dL Final     Total Protein   Date Value Ref Range Status   05/25/2020 7.1 6.0 - 8.4 g/dL Final     Comment:     *Result may be interfered by visible hemolysis     Albumin   Date Value Ref Range Status   05/25/2020 3.4 (L) 3.5 - 5.2 g/dL Final   10/11/2013 4.3 3.6 - 5.1 g/dL Final     Comment:     @ Test Performed By:  [employer] [facility]  AMELIA Vázquez M.D.,   [address] [place] [id number]  [facility] #[id number]     Total Bilirubin   Date Value Ref Range Status   05/25/2020 0.3 0.1 - 1.0 mg/dL Final     Comment:     For infants and newborns, interpretation of results should be based  on gestational age, weight and in agreement with clinical  observations.  Premature Infant recommended reference ranges:  Up to 24 hours.............<8.0 mg/dL  Up to 48 hours............<12.0 mg/dL  3-5 days..................<15.0 mg/dL  6-29 days.................<15.0 mg/dL       Alkaline Phosphatase   Date Value Ref Range Status   05/25/2020 78 55 - 135 U/L Final     AST   Date Value Ref Range Status   05/25/2020 28 10 - 40 U/L Final     Comment:     *Result may be interfered by visible hemolysis     ALT   Date Value Ref Range Status   05/25/2020 17 10 - 44 U/L Final     Anion Gap   Date Value Ref Range Status   05/25/2020 9 8 - 16 mmol/L Final     eGFR if    Date Value Ref Range Status   05/25/2020 48.9 (A) >60 mL/min/1.73 m^2 Final     eGFR if non    Date Value Ref Range Status   05/25/2020 42.3 (A) >60 mL/min/1.73 m^2 Final     Comment:     Calculation used to obtain the estimated glomerular filtration  rate (eGFR) is the CKD-EPI equation.        Lab Results   Component Value Date    HEPBSAG Negative 12/16/2019    HEPBSAB Negative  12/16/2019    HEPBCAB Negative 12/16/2019            Impression and Plan:      1.  CNS vasculitis   · Assessment: Bessy seems stable on Rituxan. He is engaged during the visit, and his physical exam is stable.  He remains cognitively impaired.   · Disease Modifying Therapies: Continue Rituxan every 16 weeks as planned. He was previously treated with Cytoxan and Imuran. Continue Vitamin D.   2.    Symptom Assessment / Management  · Fatigue: Continue Ritalin. Rx filled for the next 3 months.   · Bladder Dysfunction:Will check UA today to screen for infection.  · Gait/Endurance: Walk is slow, and he has trouble rising from seated position. Will order home health PT.     Our visit today lasted 40 minutes, and 100% of this time was spent face to face with the patient. Over 50% of this visit included discussion of the treatment plan/exam findings/coordination of care. The patient agrees with the plan of care.    GABRIEL Espino, CNS     Problem List Items Addressed This Visit     CNS vasculitis failed imuran; failed cytoxan; 1/2020 rituxan - Primary    Overview     Failed Cytoxan hiatus and transition to Imuran Sept/Oct 2018; restarted on cytoxan  12/4/19 MRI brain with/without: Chronic ischemic change as further detailed above, similar to MRI of 04/15/2019.  Multiple small foci of prior hemorrhage in the cerebellum and jose, possible sequela from hypertension.  No evidence of recent infarction or other acute intracranial pathology.         Relevant Orders    Ambulatory referral/consult to Home Health      Other Visit Diagnoses     Urinary frequency        Relevant Orders    Urinalysis, Reflex to Urine Culture Urine, Clean Catch (Completed)    Counseling regarding goals of care        Prophylactic immunotherapy        High risk medication use        Cognitive impairment        Fatigue, unspecified type        Relevant Medications    methylphenidate HCl (RITALIN) 10 MG tablet    methylphenidate HCl (RITALIN) 10 MG  tablet (Start on 8/22/2020)    methylphenidate HCl (RITALIN) 10 MG tablet (Start on 9/22/2020)

## 2020-07-22 NOTE — TELEPHONE ENCOUNTER
----- Message from Jose Wong sent at 7/22/2020  1:31 PM CDT -----  Contact: Mrs. Nunez (wife) @ [phone]  Mrs. Nunez is asking medication that Beti will be prescribing be sent to pharmacy below    [address] - [address] [facility] [address] [id number] [facility] [address]  [facility] [address]  Phone: [phone] Fax: [phone]

## 2020-07-24 ENCOUNTER — PATIENT OUTREACH (OUTPATIENT)
Dept: ADMINISTRATIVE | Facility: HOSPITAL | Age: 60
End: 2020-07-24

## 2020-07-24 RX ORDER — METHYLPHENIDATE HYDROCHLORIDE 10 MG/1
10 TABLET ORAL 2 TIMES DAILY WITH MEALS
Qty: 60 TABLET | Refills: 0 | Status: SHIPPED | OUTPATIENT
Start: 2020-08-22 | End: 2020-09-21

## 2020-07-24 RX ORDER — METHYLPHENIDATE HYDROCHLORIDE 10 MG/1
10 TABLET ORAL 2 TIMES DAILY WITH MEALS
Qty: 60 TABLET | Refills: 0 | Status: SHIPPED | OUTPATIENT
Start: 2020-09-22 | End: 2020-10-19 | Stop reason: SDUPTHER

## 2020-07-24 RX ORDER — SODIUM, POTASSIUM,MAG SULFATES 17.5-3.13G
SOLUTION, RECONSTITUTED, ORAL ORAL
Status: ON HOLD | COMMUNITY
Start: 2020-07-15 | End: 2021-02-10

## 2020-07-29 ENCOUNTER — TELEPHONE (OUTPATIENT)
Dept: SLEEP MEDICINE | Facility: OTHER | Age: 60
End: 2020-07-29

## 2020-07-30 ENCOUNTER — DOCUMENT SCAN (OUTPATIENT)
Dept: HOME HEALTH SERVICES | Facility: HOSPITAL | Age: 60
End: 2020-07-30
Payer: MEDICARE

## 2020-07-30 ENCOUNTER — HOSPITAL ENCOUNTER (INPATIENT)
Facility: HOSPITAL | Age: 60
LOS: 4 days | Discharge: HOME HEALTH | DRG: 064 | End: 2020-08-03
Attending: EMERGENCY MEDICINE | Admitting: PSYCHIATRY & NEUROLOGY
Payer: MEDICARE

## 2020-07-30 DIAGNOSIS — F03.91 DEMENTIA WITH BEHAVIORAL DISTURBANCE, UNSPECIFIED DEMENTIA TYPE: Chronic | ICD-10-CM

## 2020-07-30 DIAGNOSIS — I77.6 CNS VASCULITIS: ICD-10-CM

## 2020-07-30 DIAGNOSIS — I63.511 ACUTE RIGHT MCA STROKE: ICD-10-CM

## 2020-07-30 DIAGNOSIS — I63.81 LACUNAR STROKE OF LEFT SUBTHALAMIC REGION: Primary | ICD-10-CM

## 2020-07-30 DIAGNOSIS — I63.9 STROKE: ICD-10-CM

## 2020-07-30 DIAGNOSIS — R53.83 FATIGUE, UNSPECIFIED TYPE: ICD-10-CM

## 2020-07-30 LAB
ALBUMIN SERPL BCP-MCNC: 3.6 G/DL (ref 3.5–5.2)
ALP SERPL-CCNC: 81 U/L (ref 55–135)
ALT SERPL W/O P-5'-P-CCNC: 18 U/L (ref 10–44)
ANION GAP SERPL CALC-SCNC: 6 MMOL/L (ref 8–16)
AST SERPL-CCNC: 21 U/L (ref 10–40)
BACTERIA #/AREA URNS AUTO: NORMAL /HPF
BASOPHILS # BLD AUTO: 0.03 K/UL (ref 0–0.2)
BASOPHILS NFR BLD: 0.4 % (ref 0–1.9)
BILIRUB SERPL-MCNC: 0.4 MG/DL (ref 0.1–1)
BILIRUB UR QL STRIP: NEGATIVE
BUN SERPL-MCNC: 27 MG/DL (ref 8–23)
CALCIUM SERPL-MCNC: 8.7 MG/DL (ref 8.7–10.5)
CHLORIDE SERPL-SCNC: 107 MMOL/L (ref 95–110)
CHOLEST SERPL-MCNC: 175 MG/DL (ref 120–199)
CHOLEST/HDLC SERPL: 3.2 {RATIO} (ref 2–5)
CLARITY UR REFRACT.AUTO: CLEAR
CO2 SERPL-SCNC: 25 MMOL/L (ref 23–29)
COLOR UR AUTO: YELLOW
CREAT SERPL-MCNC: 1.6 MG/DL (ref 0.5–1.4)
DIFFERENTIAL METHOD: ABNORMAL
EOSINOPHIL # BLD AUTO: 0.1 K/UL (ref 0–0.5)
EOSINOPHIL NFR BLD: 1.4 % (ref 0–8)
ERYTHROCYTE [DISTWIDTH] IN BLOOD BY AUTOMATED COUNT: 14.3 % (ref 11.5–14.5)
EST. GFR  (AFRICAN AMERICAN): 52.6 ML/MIN/1.73 M^2
EST. GFR  (NON AFRICAN AMERICAN): 45.5 ML/MIN/1.73 M^2
ESTIMATED AVG GLUCOSE: 157 MG/DL (ref 68–131)
ESTIMATED AVG GLUCOSE: 160 MG/DL (ref 68–131)
GLUCOSE SERPL-MCNC: 187 MG/DL (ref 70–110)
GLUCOSE UR QL STRIP: NEGATIVE
HBA1C MFR BLD HPLC: 7.1 % (ref 4–5.6)
HBA1C MFR BLD HPLC: 7.2 % (ref 4–5.6)
HCT VFR BLD AUTO: 32.8 % (ref 40–54)
HDLC SERPL-MCNC: 55 MG/DL (ref 40–75)
HDLC SERPL: 31.4 % (ref 20–50)
HGB BLD-MCNC: 11 G/DL (ref 14–18)
HGB UR QL STRIP: NEGATIVE
HYALINE CASTS UR QL AUTO: 0 /LPF
IMM GRANULOCYTES # BLD AUTO: 0.03 K/UL (ref 0–0.04)
IMM GRANULOCYTES NFR BLD AUTO: 0.4 % (ref 0–0.5)
INR PPP: 0.9 (ref 0.8–1.2)
KETONES UR QL STRIP: NEGATIVE
LDLC SERPL CALC-MCNC: 65.6 MG/DL (ref 63–159)
LEUKOCYTE ESTERASE UR QL STRIP: ABNORMAL
LYMPHOCYTES # BLD AUTO: 1.4 K/UL (ref 1–4.8)
LYMPHOCYTES NFR BLD: 18.3 % (ref 18–48)
MCH RBC QN AUTO: 29.6 PG (ref 27–31)
MCHC RBC AUTO-ENTMCNC: 33.5 G/DL (ref 32–36)
MCV RBC AUTO: 88 FL (ref 82–98)
MICROSCOPIC COMMENT: NORMAL
MONOCYTES # BLD AUTO: 1 K/UL (ref 0.3–1)
MONOCYTES NFR BLD: 12.9 % (ref 4–15)
NEUTROPHILS # BLD AUTO: 5.1 K/UL (ref 1.8–7.7)
NEUTROPHILS NFR BLD: 66.6 % (ref 38–73)
NITRITE UR QL STRIP: NEGATIVE
NONHDLC SERPL-MCNC: 120 MG/DL
NRBC BLD-RTO: 0 /100 WBC
PH UR STRIP: 5 [PH] (ref 5–8)
PLATELET # BLD AUTO: 165 K/UL (ref 150–350)
PMV BLD AUTO: 9.3 FL (ref 9.2–12.9)
POCT GLUCOSE: 109 MG/DL (ref 70–110)
POCT GLUCOSE: 192 MG/DL (ref 70–110)
POTASSIUM SERPL-SCNC: 4.3 MMOL/L (ref 3.5–5.1)
PROT SERPL-MCNC: 6.9 G/DL (ref 6–8.4)
PROT UR QL STRIP: ABNORMAL
PROTHROMBIN TIME: 10.4 SEC (ref 9–12.5)
RBC # BLD AUTO: 3.72 M/UL (ref 4.6–6.2)
RBC #/AREA URNS AUTO: 1 /HPF (ref 0–4)
SARS-COV-2 RDRP RESP QL NAA+PROBE: NEGATIVE
SODIUM SERPL-SCNC: 138 MMOL/L (ref 136–145)
SP GR UR STRIP: 1.01 (ref 1–1.03)
SQUAMOUS #/AREA URNS AUTO: 0 /HPF
TRIGL SERPL-MCNC: 272 MG/DL (ref 30–150)
TSH SERPL DL<=0.005 MIU/L-ACNC: 3.14 UIU/ML (ref 0.4–4)
URN SPEC COLLECT METH UR: ABNORMAL
WBC # BLD AUTO: 7.59 K/UL (ref 3.9–12.7)
WBC #/AREA URNS AUTO: 2 /HPF (ref 0–5)

## 2020-07-30 PROCEDURE — 93005 ELECTROCARDIOGRAM TRACING: CPT

## 2020-07-30 PROCEDURE — 83036 HEMOGLOBIN GLYCOSYLATED A1C: CPT | Mod: 91

## 2020-07-30 PROCEDURE — 85610 PROTHROMBIN TIME: CPT

## 2020-07-30 PROCEDURE — 25000003 PHARM REV CODE 250: Performed by: STUDENT IN AN ORGANIZED HEALTH CARE EDUCATION/TRAINING PROGRAM

## 2020-07-30 PROCEDURE — 99223 PR INITIAL HOSPITAL CARE,LEVL III: ICD-10-PCS | Mod: AI,,, | Performed by: PSYCHIATRY & NEUROLOGY

## 2020-07-30 PROCEDURE — 99223 1ST HOSP IP/OBS HIGH 75: CPT | Mod: AI,,, | Performed by: PSYCHIATRY & NEUROLOGY

## 2020-07-30 PROCEDURE — 83036 HEMOGLOBIN GLYCOSYLATED A1C: CPT

## 2020-07-30 PROCEDURE — 80053 COMPREHEN METABOLIC PANEL: CPT

## 2020-07-30 PROCEDURE — 36415 COLL VENOUS BLD VENIPUNCTURE: CPT

## 2020-07-30 PROCEDURE — 99285 EMERGENCY DEPT VISIT HI MDM: CPT | Mod: ,,, | Performed by: EMERGENCY MEDICINE

## 2020-07-30 PROCEDURE — 82962 GLUCOSE BLOOD TEST: CPT

## 2020-07-30 PROCEDURE — 93010 EKG 12-LEAD: ICD-10-PCS | Mod: ,,, | Performed by: INTERNAL MEDICINE

## 2020-07-30 PROCEDURE — 99285 EMERGENCY DEPT VISIT HI MDM: CPT | Mod: 25

## 2020-07-30 PROCEDURE — 80061 LIPID PANEL: CPT

## 2020-07-30 PROCEDURE — 84443 ASSAY THYROID STIM HORMONE: CPT

## 2020-07-30 PROCEDURE — 11000001 HC ACUTE MED/SURG PRIVATE ROOM

## 2020-07-30 PROCEDURE — 63600175 PHARM REV CODE 636 W HCPCS: Performed by: STUDENT IN AN ORGANIZED HEALTH CARE EDUCATION/TRAINING PROGRAM

## 2020-07-30 PROCEDURE — U0002 COVID-19 LAB TEST NON-CDC: HCPCS

## 2020-07-30 PROCEDURE — 99285 PR EMERGENCY DEPT VISIT,LEVEL V: ICD-10-PCS | Mod: ,,, | Performed by: EMERGENCY MEDICINE

## 2020-07-30 PROCEDURE — 81001 URINALYSIS AUTO W/SCOPE: CPT

## 2020-07-30 PROCEDURE — 93010 ELECTROCARDIOGRAM REPORT: CPT | Mod: ,,, | Performed by: INTERNAL MEDICINE

## 2020-07-30 PROCEDURE — 85025 COMPLETE CBC W/AUTO DIFF WBC: CPT

## 2020-07-30 RX ORDER — ATORVASTATIN CALCIUM 20 MG/1
40 TABLET, FILM COATED ORAL DAILY
Status: DISCONTINUED | OUTPATIENT
Start: 2020-07-31 | End: 2020-08-03 | Stop reason: HOSPADM

## 2020-07-30 RX ORDER — HEPARIN SODIUM 5000 [USP'U]/ML
5000 INJECTION, SOLUTION INTRAVENOUS; SUBCUTANEOUS EVERY 8 HOURS
Status: DISCONTINUED | OUTPATIENT
Start: 2020-07-30 | End: 2020-08-03 | Stop reason: HOSPADM

## 2020-07-30 RX ORDER — ASPIRIN 81 MG/1
81 TABLET ORAL DAILY
Status: DISCONTINUED | OUTPATIENT
Start: 2020-07-31 | End: 2020-08-03 | Stop reason: HOSPADM

## 2020-07-30 RX ORDER — DONEPEZIL HYDROCHLORIDE 5 MG/1
5 TABLET, FILM COATED ORAL NIGHTLY
Status: DISCONTINUED | OUTPATIENT
Start: 2020-07-30 | End: 2020-08-03 | Stop reason: HOSPADM

## 2020-07-30 RX ORDER — ONDANSETRON 8 MG/1
8 TABLET, ORALLY DISINTEGRATING ORAL EVERY 8 HOURS PRN
Status: DISCONTINUED | OUTPATIENT
Start: 2020-07-30 | End: 2020-08-03 | Stop reason: HOSPADM

## 2020-07-30 RX ORDER — LABETALOL HYDROCHLORIDE 5 MG/ML
10 INJECTION, SOLUTION INTRAVENOUS
Status: DISCONTINUED | OUTPATIENT
Start: 2020-07-30 | End: 2020-08-03 | Stop reason: HOSPADM

## 2020-07-30 RX ORDER — SODIUM CHLORIDE 0.9 % (FLUSH) 0.9 %
10 SYRINGE (ML) INJECTION
Status: DISCONTINUED | OUTPATIENT
Start: 2020-07-30 | End: 2020-08-03 | Stop reason: HOSPADM

## 2020-07-30 RX ORDER — QUETIAPINE FUMARATE 25 MG/1
25 TABLET, FILM COATED ORAL NIGHTLY
Status: DISCONTINUED | OUTPATIENT
Start: 2020-07-30 | End: 2020-08-03 | Stop reason: HOSPADM

## 2020-07-30 RX ORDER — INSULIN ASPART 100 [IU]/ML
0-5 INJECTION, SOLUTION INTRAVENOUS; SUBCUTANEOUS EVERY 6 HOURS PRN
Status: DISCONTINUED | OUTPATIENT
Start: 2020-07-30 | End: 2020-08-03 | Stop reason: HOSPADM

## 2020-07-30 RX ORDER — GLUCAGON 1 MG
1 KIT INJECTION
Status: DISCONTINUED | OUTPATIENT
Start: 2020-07-30 | End: 2020-08-03 | Stop reason: HOSPADM

## 2020-07-30 RX ADMIN — QUETIAPINE FUMARATE 25 MG: 25 TABLET ORAL at 09:07

## 2020-07-30 RX ADMIN — HEPARIN SODIUM 5000 UNITS: 5000 INJECTION INTRAVENOUS; SUBCUTANEOUS at 09:07

## 2020-07-30 RX ADMIN — DONEPEZIL HYDROCHLORIDE 5 MG: 5 TABLET, FILM COATED ORAL at 09:07

## 2020-07-30 NOTE — SUBJECTIVE & OBJECTIVE
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 Love By mri.  Several active lesions, many old. 5/13: MRA brain/neck, MRI brain w/wo contrast show no vascular occlusion, multiple foci of hyperintensity in deep cerebral periventricular white matter in pattern of demyelinating process.  5/17: MRI spine performed, no spinal cord lesions. Hospitalization 6/2017. EEG was performed negative for seizures.  Repeat MRI showing new lesion, question whether this was demyelination versus CVA. 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 Findings compatible with a small acute lacunar infarct adjacent to the left frontal horn.  Nonspecific enhancement just inferior to this region and extending into the left basal ganglia, as well as within the inferior aspect of the left cerebellum.  No evidence of a focal mass. 2.  Extensive increased signal intensity involving the periventricular white matter compatible with demyelinating disease versus vasculitis. 3.  Clinical correlation and followup recommended. 4.  This reportedly flattened in the EPIC and the corpus callosum medical record system.    Mixed hyperlipidemia 11/9/2012    NAFLD (nonalcoholic fatty liver disease) 10/11/2013    CHASE (nonalcoholic steatohepatitis)     Obesity     Stroke     Type 2 diabetes mellitus with diabetic polyneuropathy, with long-term current use of insulin 5/14/2017    Type 2 diabetes mellitus with hyperglycemia, with long-term current use of insulin 10/11/2013     Past Surgical History:   Procedure Laterality Date    COLONOSCOPY N/A 5/21/2019    Procedure: COLONOSCOPY;  Surgeon: Alex Ascencio MD;  Location: [facility];  Service: Endoscopy;  Laterality: N/A;   confirmed appt-sp    INSERTION OF TUNNELED CENTRAL VENOUS CATHETER (CVC) WITH SUBCUTANEOUS PORT N/A 12/7/2018    Procedure: [id number];  Surgeon: Luan Diagnostic Provider;  Location: [facility] OR [address] [place] [address];  Service: Radiology;  Laterality: N/A;    SKIN CANCER EXCISION       Family History   Problem Relation Age of Onset    Heart disease Mother     Hypertension Mother     Heart failure Mother     Cataracts Mother     Cancer Father         lung    Diabetes Maternal Aunt     Stroke Sister     Rheum arthritis Paternal Grandmother     Amblyopia Neg Hx     Blindness Neg Hx     Glaucoma Neg Hx     Macular degeneration Neg Hx     Retinal detachment Neg Hx     Strabismus Neg Hx      Social History     Tobacco Use    Smoking status: Never Smoker    Smokeless tobacco: Never Used   Substance Use Topics    Alcohol use: Not Currently     Alcohol/week: 0.0 standard drinks     Frequency: Never     Drinks per session: 1 or 2     Binge frequency: Never    Drug use: No     Review of patient's allergies indicates:  No Known Allergies    Medications: I have reviewed the current medication administration record.    (Not in a hospital admission)      Review of Systems   Constitutional: Negative for chills and fever.   HENT: Negative for trouble swallowing.    Eyes: Negative for visual disturbance.   Respiratory: Negative for shortness of breath.    Cardiovascular: Negative for chest pain.   Gastrointestinal: Negative for nausea and vomiting.   Genitourinary: Positive for difficulty urinating. Negative for dysuria.   Neurological: Positive for speech difficulty. Negative for dizziness, facial asymmetry, weakness, light-headedness, numbness and headaches.   Psychiatric/Behavioral: Positive for confusion. Negative for agitation.     Objective:     Vital Signs (Most Recent):  Temp: 98.1 °F (36.7 °C) (07/30/20 1227)  Pulse: 61 (07/30/20 1246)  Resp: 18 (07/30/20 1246)  BP: 126/70 (07/30/20 1246)  SpO2: 100 % (07/30/20  1246)    Vital Signs Range (Last 24H):  Temp:  [98.1 °F (36.7 °C)]   Pulse:  [61-62]   Resp:  [16-18]   BP: (104-126)/(61-70)   SpO2:  [98 %-100 %]     Physical Exam  Vitals signs and nursing note reviewed.   Neurological:      Mental Status: He is alert.         Neurological Exam:   LOC: alert  Language: No aphasia  Articulation: Dysarthria  Orientation: Oriented to person, place and year. Not to month or day. Names the president.  Visual Fields: Full  EOM (CN III, IV, VI): Full/intact  Pupils (CN II, III): PERRL  Facial Sensation (CN V): Normal  Facial Movement (CN VII): Symmetric facial expression    Motor: Arm left  Normal 5/5  Arm right  Normal 5/5  Leg right Normal 5/5  Cebellar: No evidence of appendicular or axial ataxia  Sensation: Intact to light touch, temperature and vibration      Laboratory:  CMP: No results for input(s): GLUCOSE, CALCIUM, ALBUMIN, PROT, NA, K, CO2, CL, BUN, CREATININE, ALKPHOS, ALT, AST, BILITOT in the last 24 hours.  CBC: No results for input(s): WBC, RBC, HGB, HCT, PLT, MCV, MCH, MCHC in the last 168 hours.  Lipid Panel: No results for input(s): CHOL, LDLCALC, HDL, TRIG in the last 168 hours.  Coagulation: No results for input(s): PT, INR, APTT in the last 168 hours.  Hgb A1C: No results for input(s): HGBA1C in the last 168 hours.  TSH: No results for input(s): TSH in the last 168 hours.    Diagnostic Results:      Brain/Vessel imaging:    CT Head (7/30/2020) -           Interval development of focal hypodensity right thalamus concerning for lacunar type infarction overall age indeterminate may be recent.  There is intermediate density along the posterior aspect cannot exclude petechial hemorrhage or mineralization.     No evidence for acute extra-axial hemorrhage or hydrocephalus.     Scattered patchy and confluent regions of decreased attenuation supratentorial white matter suggestive for chronic ischemic change with remote left corona radiata infarction.  In addition there are  remote bilateral cerebellar infarcts.  MRI Brain (7/24/2020) -           Acute infarct in the right corona radiata.  Chronic ischemic changes.    Cardiac Evaluation:     TTE (3/9/2020) -     · Concentric left ventricular remodeling. Normal left ventricular systolic function. The estimated ejection fraction is 55%.  · Grade II (moderate) left ventricular diastolic dysfunction consistent with pseudonormalization.  · Severe left atrial enlargement.  · Normal right ventricular systolic function.  · Mild right atrial enlargement.  · Normal central venous pressure (3 mmHg).  · The estimated PA systolic pressure is 20 mmHg.

## 2020-07-30 NOTE — ED PROVIDER NOTES
Encounter Date: 7/30/2020       History     Chief Complaint   Patient presents with    Extremity Weakness     woke up 9am, history of stroke, right sided weakness, slurred speech     HPI     This is a 60-year-old man with a history of CNS vasculitis on rituximab who presents with wake up symptoms of worsening right-sided weakness, difficulty ambulating, and he is brought in by his wife out of concern for stroke.  He has been in his usual state of health recently, which includes a poor baseline mobility status, he has some baseline right-sided weakness and dysarthria, which his wife reports was worse than usual this morning.  History is limited due to patient acuity.  His wife also reports that yesterday he seemed more confused in the sense that he was less talkative.  This morning, he was walking around the house and did not seem to know what he was doing, which is not typical for him.  Normally he knows the month and day and is fully oriented.  She reports his urine has been very concentrated appearing lately and he has been very thirsty.  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 Love By mri.  Several active lesions, many old. 5/13: MRA brain/neck, MRI brain w/wo contrast show no vascular occlusion, multiple foci of hyperintensity in deep cerebral periventricular white matter in pattern of demyelinating process.  5/17: MRI spine performed, no spinal cord lesions. Hospitalization 6/2017. EEG was performed negative for seizures.  Repeat MRI showing new lesion, question whether this was demyelination versus CVA. 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 Findings  compatible with a small acute lacunar infarct adjacent to the left frontal horn.  Nonspecific enhancement just inferior to this region and extending into the left basal ganglia, as well as within the inferior aspect of the left cerebellum.  No evidence of a focal mass. 2.  Extensive increased signal intensity involving the periventricular white matter compatible with demyelinating disease versus vasculitis. 3.  Clinical correlation and followup recommended. 4.  This reportedly flattened in the EPIC and the corpus callosum medical record system.    Mixed hyperlipidemia 11/9/2012    NAFLD (nonalcoholic fatty liver disease) 10/11/2013    CHASE (nonalcoholic steatohepatitis)     Obesity     Stroke     Type 2 diabetes mellitus with diabetic polyneuropathy, with long-term current use of insulin 5/14/2017    Type 2 diabetes mellitus with hyperglycemia, with long-term current use of insulin 10/11/2013     Past Surgical History:   Procedure Laterality Date    COLONOSCOPY N/A 5/21/2019    Procedure: COLONOSCOPY;  Surgeon: Alex Ascencio MD;  Location: [facility];  Service: Endoscopy;  Laterality: N/A;  confirmed appt-sp    INSERTION OF TUNNELED CENTRAL VENOUS CATHETER (CVC) WITH SUBCUTANEOUS PORT N/A 12/7/2018    Procedure: [id number];  Surgeon: Luan Diagnostic Provider;  Location: [facility] OR [address] [place] [address];  Service: Radiology;  Laterality: N/A;    SKIN CANCER EXCISION       Family History   Problem Relation Age of Onset    Heart disease Mother     Hypertension Mother     Heart failure Mother     Cataracts Mother     Cancer Father         lung    Diabetes Maternal Aunt     Stroke Sister     Rheum arthritis Paternal Grandmother     Amblyopia Neg Hx     Blindness Neg Hx     Glaucoma Neg Hx     Macular degeneration Neg Hx     Retinal detachment Neg Hx     Strabismus Neg Hx      Social History     Tobacco Use    Smoking status: Never Smoker    Smokeless tobacco: Never Used   Substance Use Topics     "Alcohol use: Not Currently     Alcohol/week: 0.0 standard drinks     Frequency: Never     Drinks per session: 1 or 2     Binge frequency: Never    Drug use: No     Review of Systems   Unable to perform ROS: Acuity of condition       Physical Exam     Initial Vitals [07/30/20 1227]   BP Pulse Resp Temp SpO2   104/61 62 16 98.1 °F (36.7 °C) 98 %      MAP       --         Physical Exam  Gen: AxOx3, but gives 'November' as month and 'Monday" as day, NAD, well nourished  Eye: sclera anicteric, EOMI, no conjunctivitis, no periorbital edema  ENT: NCAT, OP clear, neck supple with FROM, no stridor  CVS: RRR, no m/r/g, distal pulses intact/symmetric  Pulm: CTAB, no wheezes, rales or rhonchi, no increased work of breathing  Abd: soft, nontender, nondistended, no organomegaly, no CVAT  Ext: no edema, lesions, rashes, or deformity  Neuro: GCS15, cranial nerves 2-12 are grossly intact, the patient's speech is slightly slurred but he is able to communicate.  He has 4/5 strength in the right upper and lower extremities, 5/5 in the left.  He cannot stand without maximal assistance.  He follows commands, gait is not assessed due to instability.  Psych: normal affect, cooperative  ED Course   Procedures  Labs Reviewed   CBC W/ AUTO DIFFERENTIAL - Abnormal; Notable for the following components:       Result Value    RBC 3.72 (*)     Hemoglobin 11.0 (*)     Hematocrit 32.8 (*)     All other components within normal limits   COMPREHENSIVE METABOLIC PANEL - Abnormal; Notable for the following components:    Glucose 187 (*)     BUN, Bld 27 (*)     Creatinine 1.6 (*)     Anion Gap 6 (*)     eGFR if  52.6 (*)     eGFR if non  45.5 (*)     All other components within normal limits   LIPID PANEL - Abnormal; Notable for the following components:    Triglycerides 272 (*)     All other components within normal limits   POCT GLUCOSE - Abnormal; Notable for the following components:    POCT Glucose 192 (*)     All " other components within normal limits   PROTIME-INR   TSH   URINALYSIS, REFLEX TO URINE CULTURE   SARS-COV-2 RNA AMPLIFICATION, QUAL   POCT GLUCOSE, HAND-HELD DEVICE     EKG Readings: (Independently Interpreted)   Sinus rhythm with a prolonged NV interval at 264 milliseconds, there are otherwise no obvious ST segment changes in comparison to prior from May 2020.  The first-degree AV block is new.     ECG Results          ECG 12 lead (Final result)  Result time 07/30/20 13:12:59    Final result by Interface, Lab In [facility] (07/30/20 13:12:59)                 Narrative:    Test Reason : I63.9,    Vent. Rate : 060 BPM     Atrial Rate : 060 BPM     P-R Int : 264 ms          QRS Dur : 078 ms      QT Int : 432 ms       P-R-T Axes : 058 -10 -29 degrees     QTc Int : 432 ms    Sinus rhythm with 1st degree A-V block  Voltage criteria for left ventricular hypertrophy  Nonspecific T wave abnormality  Abnormal ECG  When compared with ECG of 25-MAY-2020 03:36,  NV interval has increased    Confirmed by Sarah SANTOS, Maryjane ([id number]) on 7/30/2020 1:12:49 PM    Referred By: System System           Confirmed By:Maryjane Smith MD                            Imaging Results           MRI Brain Without Contrast (Final result)  Result time 07/30/20 14:09:55    Final result by Norm Michele DO (07/30/20 14:09:55)                 Impression:      Subcentimeter focus of diffusion restriction right parietal periventricular white matter concerning for acute/recent infarction.  In light of history is may be sequela of underlying vasculitis with superimposed severe patchy and confluent T2 FLAIR signal abnormality supratentorial white matter and jose.    Small remote left basal ganglia and bilateral thalamic remote lacunar-type infarct with additional remote inferior cerebellar infarcts with encephalomalacia.    Punctate remote microhemorrhages cerebellar hemispheres and brainstem.    Clinical correlation and follow-up advised.      Electronically  signed by: Norm Michele DO  Date:    07/30/2020  Time:    14:09             Narrative:    EXAMINATION:  MRI BRAIN WITHOUT CONTRAST    CLINICAL HISTORY:  H/o CNS vasculitis. New right thalamic infarct on CT.;    TECHNIQUE:  Sagittal and axial T1, axial T2, axial FLAIR, axial gradient and axial diffusion imaging of the whole brain without contrast.    COMPARISON:  CT 07/30/2020    FINDINGS:  There is a subcentimeter focus of restricted diffusion in the right parietal periventricular white matter concerning for acute infarction.  There is no significant mass effect or midline shift.    Superimposed patchy and confluent T2 FLAIR signal hyperintensity supratentorial white matter and jose while nonspecific in light of history may be sequela of a CNS vasculitis with remote left basal ganglia and bilateral thalamic lacunar type infarcts.  There is no midline shift or mass effect.  Punctate foci of susceptibility within the brainstem primarily within the jose and bilateral cerebellar hemispheres concerning for remote microhemorrhages with superimposed small areas of encephalomalacia inferior cerebellum compatible with remote infarcts.    This report was flagged in Epic as abnormal.                               X-Ray Chest AP Portable (Final result)  Result time 07/30/20 13:15:36    Final result by Harriett rGove MD (07/30/20 13:15:36)                 Impression:      Stable radiographic appearance of the chest.  No acute disease identified.      Electronically signed by: Harriett Grove MD  Date:    07/30/2020  Time:    13:15             Narrative:    EXAMINATION:  XR CHEST AP PORTABLE    CLINICAL HISTORY:  altered mental status;    TECHNIQUE:  Single frontal view of the chest was performed.    COMPARISON:  05/25/2020    FINDINGS:  Port overlies the right axilla.  Attached catheter extends to overlie the mediastinum with its tip near the junction of SVC and right atrium.  The appearance of the device appears similar to  5/20 May 2020.    X-ray beam attenuation and scatter occur in generous overlying soft tissues.    Mediastinal structures are midline. Cardiac silhouette and pulmonary vascular distribution are normal.    Lung volumes are normal and symmetric. I detect no pulmonary disease, pleural fluid, lymph node enlargement, cardiac decompensation, pneumothorax, pneumomediastinum, pneumoperitoneum or significant osseous abnormality.                                CT Head Without Contrast (Final result)  Result time 07/30/20 12:46:29    Final result by Norm Michele DO (07/30/20 12:46:29)                 Impression:      Interval development of focal hypodensity right thalamus concerning for lacunar type infarction overall age indeterminate may be recent.  There is intermediate density along the posterior aspect cannot exclude petechial hemorrhage or mineralization.    No evidence for acute extra-axial hemorrhage or hydrocephalus.    Scattered patchy and confluent regions of decreased attenuation supratentorial white matter suggestive for chronic ischemic change with remote left corona radiata infarction.  In addition there are remote bilateral cerebellar infarcts.    Clinical correlation and further evaluation with MRI if patient compatible.      Electronically signed by: Norm Michele DO  Date:    07/30/2020  Time:    12:46             Narrative:    EXAMINATION:  CT HEAD WITHOUT CONTRAST    CLINICAL HISTORY:  Neuro deficit, acute, stroke suspected;    TECHNIQUE:  Multiple sequential 5 mm axial images of the head without contrast.  Coronal and sagittal reformatted imaging from the axial acquisition.    COMPARISON:  03/09/2020    FINDINGS:  Interval development of focal hypodensity right posterior thalamus concerning for lacunar type infarction which may be acute.  Intermediate density along the posterior aspect cannot exclude petechial hemorrhagic conversion.  Clinical correlation and further evaluation with MRI if patient  compatible.    Superimposed generalized cerebral volume loss with patchy and confluent decreased attenuation supratentorial white matter while nonspecific concerning for chronic microvascular ischemic change similar to prior.  There is remote left corona radiata infarction extending to the basal ganglia.    There is no evidence for acute extra-axial hemorrhage.  Stable mild moderate bilateral cerebellar encephalomalacia compatible with prior infarcts.  Visualized paranasal sinuses and mastoid air cells are clear.  Case discussed with Dr. Foster 07/30/2020 at 12:44    This report was flagged in Epic as abnormal.                                 Medical Decision Making:   History:   I obtained history from: someone other than patient.       <> Summary of History: Patient's wife at bedside  Old Medical Records: I decided to obtain old medical records.  Old Records Summarized: records from another hospital.  Initial Assessment:   This is a 60-year-old man with a history of CNS vasculitis, complicated by multiple strokes, on Rituxan who presents with acute onset right-sided weakness, dysarthria, that is concerning for acute stroke.  A stroke alert was called, he was brought to CT emergently which showed a new since prior thalamic lacune infarct, though did not appear to be acute based on his symptoms.  He underwent an MRI which was also notable for acute stroke.  Given this, he will be admitted to the neurology service for further management and immunosuppressant medication.  As for his other symptoms of concentrated urine, confusion, we have ordered a urinalysis, but it is pending at time of admission to the inpatient team, they will follow up on it and administer antibiotics as needed.                                 Clinical Impression:       ICD-10-CM ICD-9-CM   1. CNS vasculitis  I77.6 447.6   2. Stroke  I63.9 434.91             ED Disposition Condition    Admit                           Elvira Foster,  MD  07/30/20 145

## 2020-07-30 NOTE — ASSESSMENT & PLAN NOTE
-- Disoriented to time today. Per wife, he has a blunt affect at baseline and is fully oriented.  -- UA pending    Delirium Precautions  -Re-orient patient frequently and keep pt's whiteboard up to date with current day and team member's names  -Keep shades open/room lit during day  -Low light at night (close blinds at night)  -Low stimulation, minimize interruptions at night  -Minimize use of restraints  -Encourage family to be at bedside  -Regular bowel movements  -Avoid opiates, benzodiazepines, antihistamines, anticholinergics, hypnotics as much as possible

## 2020-07-30 NOTE — H&P
[facility]  Vascular Neurology  Comprehensive Stroke Center  History & Physical    Inpatient consult to Vascular (Stroke) Neurology  Consult performed by: Abe Kwok MD  Consult ordered by: Elvira Foster MD        Assessment/Plan:     Patient is a 60 y.o. year old male with:    * Acute right MCA stroke  59 y/o male with history of CNS vasculitis on Rituxan, T2DM, HTN, who presented with confusion, slurred speech and right sided weakness. NIHSS 2. LKN yesterday evening (7/29).   MRI brain showed an acute infarct in the right parietal lobe.    Etiology of his current stroke is likely secondary to CNS vasculitis > small vessel disease.    -- Admit to vascular neurology  -- PT/OT/SLP  -- TTE  -- Continue ASA and statin  -- Discuss with Dr Love and Gen neuro team about Steroids if this is a flare of his vasculitis.      Antithrombotics for secondary stroke prevention: Antiplatelets: Aspirin: 81 mg daily    Statins for secondary stroke prevention and hyperlipidemia, if present:   Statins: Atorvastatin- 40 mg daily    Aggressive risk factor modification: HTN, DM, HLD, Exercise, Obesity     Rehab efforts: The patient has been evaluated by a stroke team provider and the therapy needs have been fully considered based off the presenting complaints and exam findings. The following therapy evaluations are needed: PT evaluate and treat, OT evaluate and treat, SLP evaluate and treat    Diagnostics ordered/pending: HgbA1C to assess blood glucose levels, Lipid Profile to assess cholesterol levels, TTE to assess cardiac function/status , TSH to assess thyroid function    VTE prophylaxis: Heparin 5000 units SQ every 8 hours    BP parameters: Infarct: No intervention, SBP <220        CNS vasculitis failed imuran; failed cytoxan; 1/2020 rituxan  Was previously diagnosed with CNS vasculitis in 2017. Angiogram showed multifocal areas of narrowing in the anterior and posterior circulation. LP  showed an elevated protein (93). Currently follows with [place] Love in [place] clinic. Has previously received Cytoxan to which he was responding well. However due to concern for long term exposure to this drug, he was transitioned to Rituxan. Received his first dose on 5/14/2020 and he is scheduled to receive a dose every 16 weeks.    Dementia with behavioral disturbance  -- Continue Aricept 10mg daily    Encephalopathy  -- Disoriented to time today. Per wife, he has a blunt affect at baseline and is fully oriented.  -- UA pending    Delirium Precautions  -Re-orient patient frequently and keep pt's whiteboard up to date with current day and team member's names  -Keep shades open/room lit during day  -Low light at night (close blinds at night)  -Low stimulation, minimize interruptions at night  -Minimize use of restraints  -Encourage family to be at bedside  -Regular bowel movements  -Avoid opiates, benzodiazepines, antihistamines, anticholinergics, hypnotics as much as possible    Type 2 diabetes mellitus with diabetic polyneuropathy, with long-term current use of insulin  -- Stroke risk factor  -- A1c pending (Last in March 2020 was 6.1)  -- LDSSI  -- Diabetic diet when appropriate  -- Hypoglycemia protocol     JOHN with CPAP at night  -- Stroke risk factor  -- CPAP as needed    Essential hypertension 03/09/2020 TTE concentric LV remodeling.  Normal LV systolic function LVEF 55%.  Grade 2 diastolic dysfunction.  Normal CVP.  PA pressure 20.  -- Stroke risk factor  -- SBP<220 for now    Mixed hyperlipidemia  -- Stroke risk factor  -- Continue Lipitor 40mg        STROKE DOCUMENTATION     Acute Stroke Times   Last Known Normal Date: 07/29/20  Last Known Normal Time: (Sometime yesterday evening)  Symptom Onset Date: 07/30/20  Symptom Onset Time: 0930  Stroke Team Called Date: 07/30/20  Stroke Team Called Time: 1226  Stroke Team Arrival Date: 07/30/20  Stroke Team Arrival Time: 1226  CT Interpretation Time: 1239    [facility]  Scale:  1b. LOC Questions: 1-->Answers one question correctly  10. Dysarthria: 1-->Mild-to-moderate dysarthria, patient slurs at least some words and, at worst, can be understood with some difficulty  Total (NIH Stroke Scale): 2     Modified [place] Score: 0  [place] Coma Scale:14   ABCD2 Score:    [id number] Score:   HAS -BLED Score:   ICH Score:   Hunt & Wilson Classification:      Thrombolysis Candidate? No, Out of window , History of CNS vasculitis    Delays to Thrombolysis?  No    Interventional Revascularization Candidate?   Is the patient eligible for mechanical endovascular reperfusion (MELINDA)?  No; No large vessel occlusion    Hemorrhagic change of an Ischemic Stroke: Does this patient have an ischemic stroke with hemorrhagic changes? No         Subjective:     History of Present Illness:  59 y/o male with PMH of CNS vasculitis on Rituxan (last dose on 5/14/2020), HTN, T2DM, who presents to the ER with worsening right sided weakness, slurred speech and confusion per the wife. LKN was yesterday evening (7/29).     Wife says that they got back home from [place] yesterday evening and he appeared to be confused. She assumed he was tired from a long journey. Woke up at 9am today and was still a bit confused. When he was getting out of a car at around 9:30, she noticed that he weak in his right leg and having a hard time walking. He was also having a hard time 'sitting himself up'. His speech was also slurred. At baseline, he has no weakness and speaks clearly per the wife.  In the ER, his vitals were stable. BG was around 190. CT head showed a new right thalamic infarct compared to his prior.    Follows in [place] clinic with Dr Love for CNS vasculitis. Angiogram in 2017 showed multifocal areas of narrowing in the intracranial anterior and posterior vessels. LP showed an elevated protein of 93. Was previously on Cytoxan but transition to Rituxan in 2020. Received his last dose on 5/14.    At baseline, he is able  to carry out his own ADLs.        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 Love By mri.  Several active lesions, many old. 5/13: MRA brain/neck, MRI brain w/wo contrast show no vascular occlusion, multiple foci of hyperintensity in deep cerebral periventricular white matter in pattern of demyelinating process.  5/17: MRI spine performed, no spinal cord lesions. Hospitalization 6/2017. EEG was performed negative for seizures.  Repeat MRI showing new lesion, question whether this was demyelination versus CVA. 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 Findings compatible with a small acute lacunar infarct adjacent to the left frontal horn.  Nonspecific enhancement just inferior to this region and extending into the left basal ganglia, as well as within the inferior aspect of the left cerebellum.  No evidence of a focal mass. 2.  Extensive increased signal intensity involving the periventricular white matter compatible with demyelinating disease versus vasculitis. 3.  Clinical correlation and followup recommended. 4.  This reportedly flattened in the EPIC and the corpus callosum medical record system.    Mixed hyperlipidemia 11/9/2012    NAFLD (nonalcoholic fatty liver disease) 10/11/2013    CHASE (nonalcoholic steatohepatitis)     Obesity     Stroke     Type 2 diabetes mellitus with diabetic polyneuropathy, with long-term current use of insulin 5/14/2017    Type 2 diabetes mellitus with hyperglycemia, with long-term current use of insulin 10/11/2013     Past Surgical History:   Procedure Laterality Date    COLONOSCOPY N/A 5/21/2019    Procedure: COLONOSCOPY;  Surgeon: Alex Ascencio MD;  Location: [facility];  Service:  Endoscopy;  Laterality: N/A;  confirmed appt-sp    INSERTION OF TUNNELED CENTRAL VENOUS CATHETER (CVC) WITH SUBCUTANEOUS PORT N/A 12/7/2018    Procedure: [id number];  Surgeon: Luan Diagnostic Provider;  Location: [facility] [address] [place] [address];  Service: Radiology;  Laterality: N/A;    SKIN CANCER EXCISION       Family History   Problem Relation Age of Onset    Heart disease Mother     Hypertension Mother     Heart failure Mother     Cataracts Mother     Cancer Father         lung    Diabetes Maternal Aunt     Stroke Sister     Rheum arthritis Paternal Grandmother     Amblyopia Neg Hx     Blindness Neg Hx     Glaucoma Neg Hx     Macular degeneration Neg Hx     Retinal detachment Neg Hx     Strabismus Neg Hx      Social History     Tobacco Use    Smoking status: Never Smoker    Smokeless tobacco: Never Used   Substance Use Topics    Alcohol use: Not Currently     Alcohol/week: 0.0 standard drinks     Frequency: Never     Drinks per session: 1 or 2     Binge frequency: Never    Drug use: No     Review of patient's allergies indicates:  No Known Allergies    Medications: I have reviewed the current medication administration record.    (Not in a hospital admission)      Review of Systems   Constitutional: Negative for chills and fever.   HENT: Negative for trouble swallowing.    Eyes: Negative for visual disturbance.   Respiratory: Negative for shortness of breath.    Cardiovascular: Negative for chest pain.   Gastrointestinal: Negative for nausea and vomiting.   Genitourinary: Positive for difficulty urinating. Negative for dysuria.   Neurological: Positive for speech difficulty. Negative for dizziness, facial asymmetry, weakness, light-headedness, numbness and headaches.   Psychiatric/Behavioral: Positive for confusion. Negative for agitation.     Objective:     Vital Signs (Most Recent):  Temp: 98.1 °F (36.7 °C) (07/30/20 1227)  Pulse: 61 (07/30/20 1246)  Resp: 18 (07/30/20 1246)  BP: 126/70 (07/30/20  1246)  SpO2: 100 % (07/30/20 1246)    Vital Signs Range (Last 24H):  Temp:  [98.1 °F (36.7 °C)]   Pulse:  [61-62]   Resp:  [16-18]   BP: (104-126)/(61-70)   SpO2:  [98 %-100 %]     Physical Exam  Vitals signs and nursing note reviewed.   Neurological:      Mental Status: He is alert.         Neurological Exam:   LOC: alert  Language: No aphasia  Articulation: Dysarthria  Orientation: Oriented to person, place and year. Not to month or day. Names the president.  Visual Fields: Full  EOM (CN III, IV, VI): Full/intact  Pupils (CN II, III): PERRL  Facial Sensation (CN V): Normal  Facial Movement (CN VII): Symmetric facial expression    Motor: Arm left  Normal 5/5  Arm right  Normal 5/5  Leg right Normal 5/5  Cebellar: No evidence of appendicular or axial ataxia  Sensation: Intact to light touch, temperature and vibration      Laboratory:  CMP: No results for input(s): GLUCOSE, CALCIUM, ALBUMIN, PROT, NA, K, CO2, CL, BUN, CREATININE, ALKPHOS, ALT, AST, BILITOT in the last 24 hours.  CBC: No results for input(s): WBC, RBC, HGB, HCT, PLT, MCV, MCH, MCHC in the last 168 hours.  Lipid Panel: No results for input(s): CHOL, LDLCALC, HDL, TRIG in the last 168 hours.  Coagulation: No results for input(s): PT, INR, APTT in the last 168 hours.  Hgb A1C: No results for input(s): HGBA1C in the last 168 hours.  TSH: No results for input(s): TSH in the last 168 hours.    Diagnostic Results:      Brain/Vessel imaging:    CT Head (7/30/2020) -           Interval development of focal hypodensity right thalamus concerning for lacunar type infarction overall age indeterminate may be recent.  There is intermediate density along the posterior aspect cannot exclude petechial hemorrhage or mineralization.     No evidence for acute extra-axial hemorrhage or hydrocephalus.     Scattered patchy and confluent regions of decreased attenuation supratentorial white matter suggestive for chronic ischemic change with remote left corona radiata  infarction.  In addition there are remote bilateral cerebellar infarcts.  MRI Brain (7/24/2020) -           Acute infarct in the right corona radiata.  Chronic ischemic changes.    Cardiac Evaluation:     TTE (3/9/2020) -     · Concentric left ventricular remodeling. Normal left ventricular systolic function. The estimated ejection fraction is 55%.  · Grade II (moderate) left ventricular diastolic dysfunction consistent with pseudonormalization.  · Severe left atrial enlargement.  · Normal right ventricular systolic function.  · Mild right atrial enlargement.  · Normal central venous pressure (3 mmHg).  · The estimated PA systolic pressure is 20 mmHg.        Abe Kwok MD  Comprehensive Stroke Center  Department of Vascular Neurology   [facility]

## 2020-07-30 NOTE — ASSESSMENT & PLAN NOTE
59 y/o male with history of CNS vasculitis on Rituxan, T2DM, HTN, who presented with confusion, slurred speech and right sided weakness. NIHSS 2. LKN yesterday evening (7/29).   MRI brain showed an acute infarct in the right parietal lobe.    Etiology of his current stroke is likely secondary to CNS vasculitis > small vessel disease.    -- Admit to vascular neurology  -- PT/OT/SLP  -- TTE  -- Continue ASA and statin  -- Discuss with Dr Love and Gen neuro team about Steroids if this is a flare of his vasculitis.      Antithrombotics for secondary stroke prevention: Antiplatelets: Aspirin: 81 mg daily    Statins for secondary stroke prevention and hyperlipidemia, if present:   Statins: Atorvastatin- 40 mg daily    Aggressive risk factor modification: HTN, DM, HLD, Exercise, Obesity     Rehab efforts: The patient has been evaluated by a stroke team provider and the therapy needs have been fully considered based off the presenting complaints and exam findings. The following therapy evaluations are needed: PT evaluate and treat, OT evaluate and treat, SLP evaluate and treat    Diagnostics ordered/pending: HgbA1C to assess blood glucose levels, Lipid Profile to assess cholesterol levels, TTE to assess cardiac function/status , TSH to assess thyroid function    VTE prophylaxis: Heparin 5000 units SQ every 8 hours    BP parameters: Infarct: No intervention, SBP <220

## 2020-07-30 NOTE — NURSING
Pt admitted to room 950 with wife at bedside.  Pt denies needs.  Call light within reach.  Bed in lowest position.  Report given to oncoming nurse.

## 2020-07-30 NOTE — HPI
61 y/o male with PMH of CNS vasculitis on Rituxan (last dose on 5/14/2020), HTN, T2DM, who presents to the ER with worsening right sided weakness, slurred speech and confusion per the wife. LKN was yesterday evening (7/29).     Wife says that they got back home from [place] yesterday evening and he appeared to be confused. She assumed he was tired from a long journey. Woke up at 9am today and was still a bit confused. When he was getting out of a car at around 9:30, she noticed that he weak in his right leg and having a hard time walking. He was also having a hard time 'sitting himself up'. His speech was also slurred. At baseline, he has no weakness and speaks clearly per the wife.  In the ER, his vitals were stable. BG was around 190. CT head showed a new right thalamic infarct compared to his prior.    Follows in [place] clinic with Dr Love for CNS vasculitis. Angiogram in 2017 showed multifocal areas of narrowing in the intracranial anterior and posterior vessels. LP showed an elevated protein of 93.    At baseline, he is able to carry out his own ADLs.

## 2020-07-30 NOTE — ASSESSMENT & PLAN NOTE
-- Stroke risk factor  -- A1c pending (Last in March 2020 was 6.1)  -- LDSSI  -- Diabetic diet when appropriate  -- Hypoglycemia protocol

## 2020-07-30 NOTE — ASSESSMENT & PLAN NOTE
Was previously diagnosed with CNS vasculitis in 2017. Angiogram showed multifocal areas of narrowing in the anterior and posterior circulation. LP showed an elevated protein (93). Currently follows with Dr Love in [place] clinic. Has previously received Cytoxan to which he was responding well. However due to concern for long term exposure to this drug, he was transitioned to Rituxan. Received his first dose on 5/14/2020 and he is scheduled to receive a dose every 16 weeks.

## 2020-07-31 PROBLEM — G93.6 CYTOTOXIC CEREBRAL EDEMA: Status: ACTIVE | Noted: 2020-07-31

## 2020-07-31 LAB
ALBUMIN SERPL BCP-MCNC: 3.6 G/DL (ref 3.5–5.2)
ALP SERPL-CCNC: 82 U/L (ref 55–135)
ALT SERPL W/O P-5'-P-CCNC: 19 U/L (ref 10–44)
ANION GAP SERPL CALC-SCNC: 8 MMOL/L (ref 8–16)
APTT BLDCRRT: 27.3 SEC (ref 21–32)
ASCENDING AORTA: 3.26 CM
AST SERPL-CCNC: 21 U/L (ref 10–40)
AV INDEX (PROSTH): 0.34
AV MEAN GRADIENT: 6 MMHG
AV PEAK GRADIENT: 11 MMHG
AV VALVE AREA: 1.07 CM2
AV VELOCITY RATIO: 0.39
BASOPHILS # BLD AUTO: 0.02 K/UL (ref 0–0.2)
BASOPHILS NFR BLD: 0.4 % (ref 0–1.9)
BILIRUB SERPL-MCNC: 0.4 MG/DL (ref 0.1–1)
BSA FOR ECHO PROCEDURE: 2.21 M2
BUN SERPL-MCNC: 23 MG/DL (ref 8–23)
CALCIUM SERPL-MCNC: 8.7 MG/DL (ref 8.7–10.5)
CHLORIDE SERPL-SCNC: 108 MMOL/L (ref 95–110)
CK MB SERPL-MCNC: 1.7 NG/ML (ref 0.1–6.5)
CK MB SERPL-RTO: 2.4 % (ref 0–5)
CK SERPL-CCNC: 71 U/L (ref 20–200)
CO2 SERPL-SCNC: 24 MMOL/L (ref 23–29)
CREAT SERPL-MCNC: 1.3 MG/DL (ref 0.5–1.4)
CV ECHO LV RWT: 0.44 CM
DIFFERENTIAL METHOD: ABNORMAL
DOP CALC AO PEAK VEL: 1.66 M/S
DOP CALC AO VTI: 34.22 CM
DOP CALC LVOT AREA: 3.1 CM2
DOP CALC LVOT DIAMETER: 1.99 CM
DOP CALC LVOT PEAK VEL: 0.64 M/S
DOP CALC LVOT STROKE VOLUME: 36.59 CM3
DOP CALCLVOT PEAK VEL VTI: 11.77 CM
E WAVE DECELERATION TIME: 218.76 MSEC
E/A RATIO: 0.99
E/E' RATIO: 10.57 M/S
ECHO LV POSTERIOR WALL: 1.1 CM (ref 0.6–1.1)
EOSINOPHIL # BLD AUTO: 0.1 K/UL (ref 0–0.5)
EOSINOPHIL NFR BLD: 2.5 % (ref 0–8)
ERYTHROCYTE [DISTWIDTH] IN BLOOD BY AUTOMATED COUNT: 14.2 % (ref 11.5–14.5)
EST. GFR  (AFRICAN AMERICAN): >60 ML/MIN/1.73 M^2
EST. GFR  (NON AFRICAN AMERICAN): 58.5 ML/MIN/1.73 M^2
FRACTIONAL SHORTENING: 34 % (ref 28–44)
GLUCOSE SERPL-MCNC: 134 MG/DL (ref 70–110)
HCT VFR BLD AUTO: 33.4 % (ref 40–54)
HGB BLD-MCNC: 11.1 G/DL (ref 14–18)
IMM GRANULOCYTES # BLD AUTO: 0.03 K/UL (ref 0–0.04)
IMM GRANULOCYTES NFR BLD AUTO: 0.5 % (ref 0–0.5)
INR PPP: 1 (ref 0.8–1.2)
INTERVENTRICULAR SEPTUM: 1.05 CM (ref 0.6–1.1)
LA MAJOR: 6.2 CM
LA MINOR: 5.82 CM
LA WIDTH: 4.19 CM
LEFT ATRIUM SIZE: 4.2 CM
LEFT ATRIUM VOLUME INDEX: 41.4 ML/M2
LEFT ATRIUM VOLUME: 89.81 CM3
LEFT INTERNAL DIMENSION IN SYSTOLE: 3.29 CM (ref 2.1–4)
LEFT VENTRICLE DIASTOLIC VOLUME INDEX: 54.81 ML/M2
LEFT VENTRICLE DIASTOLIC VOLUME: 118.9 ML
LEFT VENTRICLE MASS INDEX: 93 G/M2
LEFT VENTRICLE SYSTOLIC VOLUME INDEX: 20.2 ML/M2
LEFT VENTRICLE SYSTOLIC VOLUME: 43.81 ML
LEFT VENTRICULAR INTERNAL DIMENSION IN DIASTOLE: 5.01 CM (ref 3.5–6)
LEFT VENTRICULAR MASS: 201.37 G
LV LATERAL E/E' RATIO: 8.22 M/S
LV SEPTAL E/E' RATIO: 14.8 M/S
LYMPHOCYTES # BLD AUTO: 1.3 K/UL (ref 1–4.8)
LYMPHOCYTES NFR BLD: 23.6 % (ref 18–48)
MAGNESIUM SERPL-MCNC: 2 MG/DL (ref 1.6–2.6)
MCH RBC QN AUTO: 29.6 PG (ref 27–31)
MCHC RBC AUTO-ENTMCNC: 33.2 G/DL (ref 32–36)
MCV RBC AUTO: 89 FL (ref 82–98)
MONOCYTES # BLD AUTO: 0.7 K/UL (ref 0.3–1)
MONOCYTES NFR BLD: 12 % (ref 4–15)
MV PEAK A VEL: 0.75 M/S
MV PEAK E VEL: 0.74 M/S
MV STENOSIS PRESSURE HALF TIME: 63.44 MS
MV VALVE AREA P 1/2 METHOD: 3.47 CM2
NEUTROPHILS # BLD AUTO: 3.5 K/UL (ref 1.8–7.7)
NEUTROPHILS NFR BLD: 61 % (ref 38–73)
NRBC BLD-RTO: 0 /100 WBC
PHOSPHATE SERPL-MCNC: 3.6 MG/DL (ref 2.7–4.5)
PLATELET # BLD AUTO: 142 K/UL (ref 150–350)
PMV BLD AUTO: 9.5 FL (ref 9.2–12.9)
POCT GLUCOSE: 126 MG/DL (ref 70–110)
POCT GLUCOSE: 129 MG/DL (ref 70–110)
POCT GLUCOSE: 151 MG/DL (ref 70–110)
POTASSIUM SERPL-SCNC: 4.3 MMOL/L (ref 3.5–5.1)
PROT SERPL-MCNC: 6.7 G/DL (ref 6–8.4)
PROTHROMBIN TIME: 10.9 SEC (ref 9–12.5)
RA MAJOR: 4.82 CM
RA PRESSURE: 3 MMHG
RA WIDTH: 3.3 CM
RBC # BLD AUTO: 3.75 M/UL (ref 4.6–6.2)
RIGHT VENTRICULAR END-DIASTOLIC DIMENSION: 3.82 CM
SINUS: 3.49 CM
SODIUM SERPL-SCNC: 140 MMOL/L (ref 136–145)
STJ: 3.47 CM
TDI LATERAL: 0.09 M/S
TDI SEPTAL: 0.05 M/S
TDI: 0.07 M/S
TRICUSPID ANNULAR PLANE SYSTOLIC EXCURSION: 2.25 CM
TROPONIN I SERPL DL<=0.01 NG/ML-MCNC: 0.01 NG/ML (ref 0–0.03)
WBC # BLD AUTO: 5.69 K/UL (ref 3.9–12.7)

## 2020-07-31 PROCEDURE — 63600175 PHARM REV CODE 636 W HCPCS: Performed by: STUDENT IN AN ORGANIZED HEALTH CARE EDUCATION/TRAINING PROGRAM

## 2020-07-31 PROCEDURE — 97161 PT EVAL LOW COMPLEX 20 MIN: CPT

## 2020-07-31 PROCEDURE — 99233 SBSQ HOSP IP/OBS HIGH 50: CPT | Mod: GC,,, | Performed by: PSYCHIATRY & NEUROLOGY

## 2020-07-31 PROCEDURE — 92523 SPEECH SOUND LANG COMPREHEN: CPT

## 2020-07-31 PROCEDURE — 82550 ASSAY OF CK (CPK): CPT

## 2020-07-31 PROCEDURE — 97802 MEDICAL NUTRITION INDIV IN: CPT

## 2020-07-31 PROCEDURE — 92610 EVALUATE SWALLOWING FUNCTION: CPT

## 2020-07-31 PROCEDURE — 97535 SELF CARE MNGMENT TRAINING: CPT

## 2020-07-31 PROCEDURE — 99222 1ST HOSP IP/OBS MODERATE 55: CPT | Mod: ,,, | Performed by: NURSE PRACTITIONER

## 2020-07-31 PROCEDURE — 85730 THROMBOPLASTIN TIME PARTIAL: CPT

## 2020-07-31 PROCEDURE — 84100 ASSAY OF PHOSPHORUS: CPT

## 2020-07-31 PROCEDURE — 94761 N-INVAS EAR/PLS OXIMETRY MLT: CPT

## 2020-07-31 PROCEDURE — 83735 ASSAY OF MAGNESIUM: CPT

## 2020-07-31 PROCEDURE — 85610 PROTHROMBIN TIME: CPT

## 2020-07-31 PROCEDURE — 11000001 HC ACUTE MED/SURG PRIVATE ROOM

## 2020-07-31 PROCEDURE — 97116 GAIT TRAINING THERAPY: CPT

## 2020-07-31 PROCEDURE — 63600175 PHARM REV CODE 636 W HCPCS: Performed by: PSYCHIATRY & NEUROLOGY

## 2020-07-31 PROCEDURE — 36415 COLL VENOUS BLD VENIPUNCTURE: CPT

## 2020-07-31 PROCEDURE — 84484 ASSAY OF TROPONIN QUANT: CPT

## 2020-07-31 PROCEDURE — 97165 OT EVAL LOW COMPLEX 30 MIN: CPT

## 2020-07-31 PROCEDURE — 99222 PR INITIAL HOSPITAL CARE,LEVL II: ICD-10-PCS | Mod: ,,, | Performed by: NURSE PRACTITIONER

## 2020-07-31 PROCEDURE — 25000003 PHARM REV CODE 250: Performed by: STUDENT IN AN ORGANIZED HEALTH CARE EDUCATION/TRAINING PROGRAM

## 2020-07-31 PROCEDURE — 82553 CREATINE MB FRACTION: CPT

## 2020-07-31 PROCEDURE — 85025 COMPLETE CBC W/AUTO DIFF WBC: CPT

## 2020-07-31 PROCEDURE — 80053 COMPREHEN METABOLIC PANEL: CPT

## 2020-07-31 PROCEDURE — 99233 PR SUBSEQUENT HOSPITAL CARE,LEVL III: ICD-10-PCS | Mod: GC,,, | Performed by: PSYCHIATRY & NEUROLOGY

## 2020-07-31 RX ADMIN — DONEPEZIL HYDROCHLORIDE 5 MG: 5 TABLET, FILM COATED ORAL at 08:07

## 2020-07-31 RX ADMIN — HEPARIN SODIUM 5000 UNITS: 5000 INJECTION INTRAVENOUS; SUBCUTANEOUS at 08:07

## 2020-07-31 RX ADMIN — QUETIAPINE FUMARATE 25 MG: 25 TABLET ORAL at 08:07

## 2020-07-31 RX ADMIN — HEPARIN SODIUM 5000 UNITS: 5000 INJECTION INTRAVENOUS; SUBCUTANEOUS at 01:07

## 2020-07-31 RX ADMIN — HEPARIN SODIUM 5000 UNITS: 5000 INJECTION INTRAVENOUS; SUBCUTANEOUS at 05:07

## 2020-07-31 RX ADMIN — HUMAN ALBUMIN MICROSPHERES AND PERFLUTREN 0.66 MG: 10; .22 INJECTION, SOLUTION INTRAVENOUS at 11:07

## 2020-07-31 RX ADMIN — ASPIRIN 81 MG: 81 TABLET, COATED ORAL at 08:07

## 2020-07-31 RX ADMIN — ATORVASTATIN CALCIUM 40 MG: 20 TABLET, FILM COATED ORAL at 08:07

## 2020-07-31 NOTE — CONSULTS
Inpatient consult to Physical Medicine Rehab  Consult performed by: Evon Tavera NP  Consult ordered by: Abe Kwok MD  Reason for consult: assess rehab needs        Reviewed patient history and current admission.  Rehab team following.  Full consult to follow.    GABRIEL Arriaza, FNP-C  Physical Medicine & Rehabilitation   07/31/2020

## 2020-07-31 NOTE — SUBJECTIVE & OBJECTIVE
Neurologic Chief Complaint: worsening confusion, slurred speech and right side weakness    Subjective:     Interval History: Patient is seen for follow-up neurological assessment and treatment recommendations: Oriented x 3 today. Mild dysarthria. ASA 81 mg, Lipitor 40 mg. Echo and PT/OT/SLP eval pending. Staff to staff conversation regarding CNS vasculitis pending.     HPI, Past Medical, Family, and Social History remains the same as documented in the initial encounter.     Review of Systems   Constitutional: Negative for chills and fever.   HENT: Negative for trouble swallowing.    Eyes: Negative for visual disturbance.   Respiratory: Negative for shortness of breath.    Cardiovascular: Negative for chest pain.   Gastrointestinal: Negative for diarrhea, nausea and vomiting.   Neurological: Positive for speech difficulty. Negative for facial asymmetry, weakness, numbness and headaches.   Psychiatric/Behavioral: Negative for confusion and decreased concentration.     Scheduled Meds:   aspirin  81 mg Oral Daily    atorvastatin  40 mg Oral Daily    donepeziL  5 mg Oral QHS    heparin (porcine)  5,000 Units Subcutaneous Q8H    QUEtiapine  25 mg Oral QHS     Continuous Infusions:   sodium chloride 0.9%       PRN Meds:dextrose 50%, glucagon (human recombinant), insulin aspart U-100, labetaloL, ondansetron, sodium chloride 0.9%, sodium chloride 0.9%    Objective:     Vital Signs (Most Recent):  Temp: 98 °F (36.7 °C) (07/31/20 0738)  Pulse: 61 (07/31/20 0738)  Resp: 18 (07/31/20 0738)  BP: (!) 153/74 (07/31/20 0738)  SpO2: 97 % (07/31/20 0738)  BP Location: Left arm    Vital Signs Range (Last 24H):  Temp:  [97.6 °F (36.4 °C)-98.6 °F (37 °C)]   Pulse:  [58-65]   Resp:  [12-18]   BP: ()/(61-92)   SpO2:  [96 %-100 %]   BP Location: Left arm    Physical Exam  Vitals signs reviewed.   Constitutional:       Appearance: Normal appearance.   HENT:      Head: Normocephalic.   Eyes:      Pupils: Pupils are equal, round,  and reactive to light.   Cardiovascular:      Rate and Rhythm: Normal rate.   Pulmonary:      Effort: Pulmonary effort is normal.   Skin:     General: Skin is warm and dry.   Neurological:      Mental Status: He is alert and oriented to person, place, and time.   Psychiatric:         Speech: Speech is slurred.         Neurological Exam:   LOC: alert  Attention Span: Good   Language: No aphasia  Articulation: Dysarthria  Orientation: Person, Place, Time   Visual Fields: Full  EOM (CN III, IV, VI): Full/intact  Facial Movement (CN VII): Symmetric facial expression    Motor: Arm left  Normal 5/5  Leg left  Normal 5/5  Arm right  Normal 5/5  Leg right Normal 5/5  Cebellar: No evidence of appendicular or axial ataxia  Sensation: Intact to light touch, temperature and vibration  Tone: Normal tone throughout    Laboratory:  CMP:   Recent Labs   Lab 07/31/20  0450   CALCIUM 8.7   ALBUMIN 3.6   PROT 6.7      K 4.3   CO2 24      BUN 23   CREATININE 1.3   ALKPHOS 82   ALT 19   AST 21   BILITOT 0.4     CBC:   Recent Labs   Lab 07/31/20  0450   WBC 5.69   RBC 3.75*   HGB 11.1*   HCT 33.4*   *   MCV 89   MCH 29.6   MCHC 33.2     Lipid Panel:   Recent Labs   Lab 07/30/20  1305   CHOL 175   LDLCALC 65.6   HDL 55   TRIG 272*     Hgb A1C:   Recent Labs   Lab 07/30/20  1900   HGBA1C 7.2*     TSH:   Recent Labs   Lab 07/30/20  1305   TSH 3.144       Diagnostic Results     Brain/Vessel imaging:     CT Head (7/30/2020) -              Interval development of focal hypodensity right thalamus concerning for lacunar type infarction overall age indeterminate may be recent.  There is intermediate density along the posterior aspect cannot exclude petechial hemorrhage or mineralization.     No evidence for acute extra-axial hemorrhage or hydrocephalus.     Scattered patchy and confluent regions of decreased attenuation supratentorial white matter suggestive for chronic ischemic change with remote left corona radiata  infarction.  In addition there are remote bilateral cerebellar infarcts.  MRI Brain (7/24/2020) -             Acute infarct in the right corona radiata.  Chronic ischemic changes.     Cardiac Evaluation:      TTE (3/9/2020) -      · Concentric left ventricular remodeling. Normal left ventricular systolic function. The estimated ejection fraction is 55%.  · Grade II (moderate) left ventricular diastolic dysfunction consistent with pseudonormalization.  · Severe left atrial enlargement.  · Normal right ventricular systolic function.  · Mild right atrial enlargement.  · Normal central venous pressure (3 mmHg).  · The estimated PA systolic pressure is 20 mmHg.

## 2020-07-31 NOTE — ASSESSMENT & PLAN NOTE
61 y/o male with history of CNS vasculitis on Rituxan, T2DM, HTN, who presented with confusion, slurred speech and right sided weakness. NIHSS 2. LKN the evening of 7/29.   MRI brain showed an acute infarct in the right parietal lobe.    Etiology of his current stroke is likely secondary to CNS vasculitis vs. small vessel disease.    -- Discuss with Dr Love and Gen neuro team about Steroids if this is a flare of his vasculitis.    - Dr. Matias to speak with Dr. Love today       Antithrombotics for secondary stroke prevention: Antiplatelets: Aspirin: 81 mg daily    Statins for secondary stroke prevention and hyperlipidemia, if present:   Statins: Atorvastatin- 40 mg daily     Aggressive risk factor modification: HTN, DM, HLD, Exercise, Obesity     Rehab efforts: The patient has been evaluated by a stroke team provider and the therapy needs have been fully considered based off the presenting complaints and exam findings. The following therapy evaluations are needed: PT evaluate and treat, OT evaluate and treat, SLP evaluate and treat: OT recommending rehab, PT/SLP pending    Diagnostics ordered/pending: TTE to assess cardiac function/status     VTE prophylaxis: Heparin 5000 units SQ every 8 hours, SCDs    BP parameters: Infarct: No intervention, SBP <220

## 2020-07-31 NOTE — PROGRESS NOTES
[facility]  Vascular Neurology  Comprehensive Stroke Center  Progress Note    Assessment/Plan:     * Acute right MCA stroke  59 y/o male with history of CNS vasculitis on Rituxan, T2DM, HTN, who presented with confusion, slurred speech and right sided weakness. NIHSS 2. LKN the evening of 7/29.   MRI brain showed an acute infarct in the right parietal lobe.    Etiology of his current stroke is likely secondary to CNS vasculitis vs. small vessel disease.    -- Discuss with Dr Love and Gen neuro team about Steroids if this is a flare of his vasculitis.    - Dr. Matias to speak with Dr. Love today       Antithrombotics for secondary stroke prevention: Antiplatelets: Aspirin: 81 mg daily    Statins for secondary stroke prevention and hyperlipidemia, if present:   Statins: Atorvastatin- 40 mg daily     Aggressive risk factor modification: HTN, DM, HLD, Exercise, Obesity     Rehab efforts: The patient has been evaluated by a stroke team provider and the therapy needs have been fully considered based off the presenting complaints and exam findings. The following therapy evaluations are needed: PT evaluate and treat, OT evaluate and treat, SLP evaluate and treat: OT recommending rehab, PT/SLP pending    Diagnostics ordered/pending: TTE to assess cardiac function/status     VTE prophylaxis: Heparin 5000 units SQ every 8 hours, SCDs    BP parameters: Infarct: No intervention, SBP <220        Cytotoxic cerebral edema  Area of cytotoxic cerebral edema identified when reviewing brain imaging in the territory of the right middle cerebral artery. There is no mass effect associated with it. We will continue to monitor the patients clinical exam for any worsening of symptoms which may indicate expansion of the stroke or the area of the edema resulting in the clinical change. The pattern is suggestive of small vessel disease vs CNS vasculitis etiology.        Dementia with behavioral disturbance  -- Continue  Aricept 10mg daily    CNS vasculitis failed imuran; failed cytoxan; 1/2020 rituxan  Was previously diagnosed with CNS vasculitis in 2017. Angiogram showed multifocal areas of narrowing in the anterior and posterior circulation. LP showed an elevated protein (93). Currently follows with Dr Love in [place] clinic. Has previously received Cytoxan to which he was responding well. However due to concern for long term exposure to this drug, he was transitioned to Rituxan. Received his first dose on 5/14/2020 and he is scheduled to receive a dose every 16 weeks.    Staff to staff conversation regarding CNS vasculitis and stroke pending.     Encephalopathy  -- Disoriented to time 7/30. Per wife, he has a blunt affect at baseline and is fully oriented.  -- UA negative    Delirium Precautions  -Re-orient patient frequently and keep pt's whiteboard up to date with current day and team member's names  -Keep shades open/room lit during day  -Low light at night (close blinds at night)  -Low stimulation, minimize interruptions at night  -Minimize use of restraints  -Encourage family to be at bedside  -Regular bowel movements  -Avoid opiates, benzodiazepines, antihistamines, anticholinergics, hypnotics as much as possible    Type 2 diabetes mellitus with diabetic polyneuropathy, with long-term current use of insulin  -- Stroke risk factor  -- A1c 7.2  -- LDSSI  -- Diabetic diet when appropriate  -- Hypoglycemia protocol     JOHN with CPAP at night  -- Stroke risk factor  -- CPAP as needed    Essential hypertension 03/09/2020 TTE concentric LV remodeling.  Normal LV systolic function LVEF 55%.  Grade 2 diastolic dysfunction.  Normal CVP.  PA pressure 20.  -- Stroke risk factor  -- SBP<220 for now    Mixed hyperlipidemia  -- Stroke risk factor  -- LDL 65  -- Continue Lipitor 40mg           7/31: Oriented x 3 today. Mild dysarthria. ASA 81 mg, Lipitor 40 mg. Echo and PT/OT/SLP eval pending. Staff to staff conversation regarding CNS  vasculitis pending.     STROKE DOCUMENTATION   Acute Stroke Times   Last Known Normal Date: 07/29/20  Last Known Normal Time: (Sometime yesterday evening)  Symptom Onset Date: 07/30/20  Symptom Onset Time: 0930  Stroke Team Called Date: 07/30/20  Stroke Team Called Time: 1226  Stroke Team Arrival Date: 07/30/20  Stroke Team Arrival Time: 1226  CT Interpretation Time: 1239    NIH Scale:  1a. Level of Consciousness: 0-->Alert, keenly responsive  1b. LOC Questions: 0-->Answers both questions correctly  1c. LOC Commands: 0-->Performs both tasks correctly  2. Best Gaze: 0-->Normal  3. Visual: 0-->No visual loss  4. Facial Palsy: 0-->Normal symmetrical movements  5a. Motor Arm, Left: 0-->No drift, limb holds 90 (or 45) degrees for full 10 secs  5b. Motor Arm, Right: 0-->No drift, limb holds 90 (or 45) degrees for full 10 secs  6a. Motor Leg, Left: 0-->No drift, leg holds 30 degree position for full 5 secs  6b. Motor Leg, Right: 0-->No drift, leg holds 30 degree position for full 5 secs  7. Limb Ataxia: 0-->Absent  8. Sensory: 0-->Normal, no sensory loss  9. Best Language: 0-->No aphasia, normal  10. Dysarthria: 1-->Mild-to-moderate dysarthria, patient slurs at least some words and, at worst, can be understood with some difficulty  11. Extinction and Inattention (formerly Neglect): 0-->No abnormality  Total (NIH Stroke Scale): 1       Modified Gunjan Score: 0  [place] Coma Scale:    ABCD2 Score:    [id number] Score:   HAS -BLED Score:   ICH Score:   Hunt & Wilson Classification:      Hemorrhagic change of an Ischemic Stroke: Does this patient have an ischemic stroke with hemorrhagic changes? No     Neurologic Chief Complaint: worsening confusion, slurred speech and right side weakness    Subjective:     Interval History: Patient is seen for follow-up neurological assessment and treatment recommendations: Oriented x 3 today. Mild dysarthria. ASA 81 mg, Lipitor 40 mg. Echo and PT/OT/SLP eval pending. Staff to staff  conversation regarding CNS vasculitis pending.     HPI, Past Medical, Family, and Social History remains the same as documented in the initial encounter.     Review of Systems   Constitutional: Negative for chills and fever.   HENT: Negative for trouble swallowing.    Eyes: Negative for visual disturbance.   Respiratory: Negative for shortness of breath.    Cardiovascular: Negative for chest pain.   Gastrointestinal: Negative for diarrhea, nausea and vomiting.   Neurological: Positive for speech difficulty. Negative for facial asymmetry, weakness, numbness and headaches.   Psychiatric/Behavioral: Negative for confusion and decreased concentration.     Scheduled Meds:   aspirin  81 mg Oral Daily    atorvastatin  40 mg Oral Daily    donepeziL  5 mg Oral QHS    heparin (porcine)  5,000 Units Subcutaneous Q8H    QUEtiapine  25 mg Oral QHS     Continuous Infusions:   sodium chloride 0.9%       PRN Meds:dextrose 50%, glucagon (human recombinant), insulin aspart U-100, labetaloL, ondansetron, sodium chloride 0.9%, sodium chloride 0.9%    Objective:     Vital Signs (Most Recent):  Temp: 98 °F (36.7 °C) (07/31/20 0738)  Pulse: 61 (07/31/20 0738)  Resp: 18 (07/31/20 0738)  BP: (!) 153/74 (07/31/20 0738)  SpO2: 97 % (07/31/20 0738)  BP Location: Left arm    Vital Signs Range (Last 24H):  Temp:  [97.6 °F (36.4 °C)-98.6 °F (37 °C)]   Pulse:  [58-65]   Resp:  [12-18]   BP: ()/(61-92)   SpO2:  [96 %-100 %]   BP Location: Left arm    Physical Exam  Vitals signs reviewed.   Constitutional:       Appearance: Normal appearance.   HENT:      Head: Normocephalic.   Eyes:      Pupils: Pupils are equal, round, and reactive to light.   Cardiovascular:      Rate and Rhythm: Normal rate.   Pulmonary:      Effort: Pulmonary effort is normal.   Skin:     General: Skin is warm and dry.   Neurological:      Mental Status: He is alert and oriented to person, place, and time.   Psychiatric:         Speech: Speech is slurred.          Neurological Exam:   LOC: alert  Attention Span: Good   Language: No aphasia  Articulation: Dysarthria  Orientation: Person, Place, Time   Visual Fields: Full  EOM (CN III, IV, VI): Full/intact  Facial Movement (CN VII): Symmetric facial expression    Motor: Arm left  Normal 5/5  Leg left  Normal 5/5  Arm right  Normal 5/5  Leg right Normal 5/5  Cebellar: No evidence of appendicular or axial ataxia  Sensation: Intact to light touch, temperature and vibration  Tone: Normal tone throughout    Laboratory:  CMP:   Recent Labs   Lab 07/31/20  0450   CALCIUM 8.7   ALBUMIN 3.6   PROT 6.7      K 4.3   CO2 24      BUN 23   CREATININE 1.3   ALKPHOS 82   ALT 19   AST 21   BILITOT 0.4     CBC:   Recent Labs   Lab 07/31/20  0450   WBC 5.69   RBC 3.75*   HGB 11.1*   HCT 33.4*   *   MCV 89   MCH 29.6   MCHC 33.2     Lipid Panel:   Recent Labs   Lab 07/30/20  1305   CHOL 175   LDLCALC 65.6   HDL 55   TRIG 272*     Hgb A1C:   Recent Labs   Lab 07/30/20  1900   HGBA1C 7.2*     TSH:   Recent Labs   Lab 07/30/20  1305   TSH 3.144       Diagnostic Results     Brain/Vessel imaging:     CT Head (7/30/2020) -              Interval development of focal hypodensity right thalamus concerning for lacunar type infarction overall age indeterminate may be recent.  There is intermediate density along the posterior aspect cannot exclude petechial hemorrhage or mineralization.     No evidence for acute extra-axial hemorrhage or hydrocephalus.     Scattered patchy and confluent regions of decreased attenuation supratentorial white matter suggestive for chronic ischemic change with remote left corona radiata infarction.  In addition there are remote bilateral cerebellar infarcts.  MRI Brain (7/24/2020) -             Acute infarct in the right corona radiata.  Chronic ischemic changes.     Cardiac Evaluation:      TTE (3/9/2020) -      · Concentric left ventricular remodeling. Normal left ventricular systolic function. The  estimated ejection fraction is 55%.  · Grade II (moderate) left ventricular diastolic dysfunction consistent with pseudonormalization.  · Severe left atrial enlargement.  · Normal right ventricular systolic function.  · Mild right atrial enlargement.  · Normal central venous pressure (3 mmHg).  · The estimated PA systolic pressure is 20 mmHg.           Alfa Stephenson NP  [facility]  Department of Vascular Neurology   [facility]

## 2020-07-31 NOTE — HOSPITAL COURSE
07/31/2020: Bed mobility CGA.  Sit to stand CGA & RW.  Ambulated 75 ft CGA[place] & RW.  [place] LBD CGA. Passed bedside swallow evaluation.  SLP recommending regular diet and thin liquids.  8/1: BM, sit to stand, trxs, groom, toilet CGA.       
7/31: Oriented x 3 today. Mild dysarthria. ASA 81 mg, Lipitor 40 mg. Echo and PT/OT/SLP eval pending. Staff to staff conversation regarding CNS vasculitis pending.   8/1: Echo complete. Therapy recommending rehab placement. Started Plavix today.   8/2: No acute events overnight. Pending placement  8/3: patient without any new or worsening symptoms. He is going home with home health today. Restarted home diltiazem on discharge. Discharging with walker and HH.   
WDL

## 2020-07-31 NOTE — PLAN OF CARE
SW met with pt and his wife to complete d/c assessment. Pts wife reports pt requires assistance with ADLs, has rollator, BSC and shower chair. She is requesting a rolling walker upon dc. Pt has assistance at home from wife and has private sitters through [employer], 4 hrs a day, 3x per week. Pts wife is in agreement with therapy recs, inpt rehab. She prefers pt to stay within the [facility] system and would like referral sent to [facility].     Edgar Nova MD  [address] / [place] [id number] [phone]    [facility] - [place] - [address]  [address]  [facility] [address]  Phone: [phone] Fax: [phone]    [facility] - [place] - [address] [facility] [id number]  [place] [address]  Phone: [phone] Fax: [phone]    Payor: [employer] MEDICARE / Plan: Everplans 65 / Product Type: Medicare Advantage /        07/31/20 1513   Discharge Assessment   Assessment Type Discharge Planning Assessment   Confirmed/corrected address and phone number on facesheet? Yes   Assessment information obtained from? Patient;Caregiver   Prior to hospitilization cognitive status: Alert/Oriented   Prior to hospitalization functional status: Needs Assistance   Current cognitive status: Unable to Assess   Current Functional Status: Needs Assistance   Lives With spouse   Able to Return to Prior Arrangements yes   Is patient able to care for self after discharge? No   Who are your caregiver(s) and their phone number(s)? wife Bekah [email or web address]-[phone]   Patient's perception of discharge disposition rehab facility   Readmission Within the Last 30 Days no previous admission in last 30 days   Patient currently being followed by outpatient case management? No   Patient currently receives any other outside agency services? No   Equipment Currently Used at Home 3-in-1 commode;cane, quad;cane, straight;rollator   Do you have any problems affording any of your prescribed medications?  No   Is the patient taking medications as prescribed? yes   Does the patient have transportation home? Yes   Transportation Anticipated family or friend will provide   Does the patient receive services at the [facility]mad[facility]? No   Discharge Plan A Rehab   Discharge Plan B Skilled Nursing Facility   DME Needed Upon Discharge  walker, rolling   Patient/Family in Agreement with Plan yes

## 2020-07-31 NOTE — PLAN OF CARE
Problem: Fall Injury Risk  Goal: Absence of Fall and Fall-Related Injury  Outcome: Ongoing, Progressing  Intervention: Identify and Manage Contributors to Fall Injury Risk  Flowsheets (Taken 7/31/2020 1523)  Self-Care Promotion: independence encouraged  Medication Review/Management: medications reviewed  Intervention: Promote Injury-Free Environment  Flowsheets (Taken 7/31/2020 1523)  Safety Promotion/Fall Prevention:   side rails raised x 2   /camera at bedside  Environmental Safety Modification: assistive device/personal items within reach     Problem: Adjustment to Illness (Stroke, Ischemic/Transient Ischemic Attack)  Goal: Optimal Coping  Outcome: Ongoing, Progressing  Intervention: Support Patient/Family Psychosocial Response to Stroke  Flowsheets (Taken 7/31/2020 1523)  Supportive Measures: active listening utilized  Family/Support System Care: self-care encouraged

## 2020-07-31 NOTE — HPI
Bessy Nunez is a 60-year-old male with PMHx of CNS vasculitis (on Rituxan), T2DM, & HTN.  Patient presented to [facility] on 7/30 with confusion, slurred speech, and R sided weakness. MRI brain showed an acute infarct in the right parietal lobe per stroke team. Per stroke team, etiology of his current stroke is likely secondary to CNS vasculitis > small vessel disease. TTE EF 60 %; mild left atrial enlargement.     Functional History: Patient lives in [place] with wife in a single story home with 1 step to enter.  Prior to admission, (I) with ADLs and mobility. DME: none.

## 2020-07-31 NOTE — PLAN OF CARE
Pt with very flat affect and limited mental flexibility. Speech to continue to follow.     Jagruti Pacheco MS, CCC-SLP  Speech Language Pathologist  Pager: [phone]  Date 7/31/2020

## 2020-07-31 NOTE — ASSESSMENT & PLAN NOTE
Was previously diagnosed with CNS vasculitis in 2017. Angiogram showed multifocal areas of narrowing in the anterior and posterior circulation. LP showed an elevated protein (93). Currently follows with Dr Love in [place] clinic. Has previously received Cytoxan to which he was responding well. However due to concern for long term exposure to this drug, he was transitioned to Rituxan. Received his first dose on 5/14/2020 and he is scheduled to receive a dose every 16 weeks.    Staff to staff conversation regarding CNS vasculitis and stroke pending.

## 2020-07-31 NOTE — PLAN OF CARE
Problem: Occupational Therapy Goal  Goal: Occupational Therapy Goal  Description: Goals set on 7/31 with expiration date 8/14:  Patient will increase functional independence with ADLs by performing:    Supine <> Sit with Stand-by Assistance.  Feeding with Stand-by Assistance.  Grooming while standing at sink with Stand-by Assistance.  UB Dressing with Stand-by Assistance.  LB Dressing with Stand-by Assistance.  Step transfer with Stand-by Assistance with DME as needed.  Pt will demonstrate understanding of education provided regarding energy conservation and task modification through teach-back method.         Outcome: Ongoing, Progressing       Eval complete. Pt tolerated session well. Initiate OT POC.  Danika Seo, JOHNR  07/31/2020

## 2020-07-31 NOTE — PLAN OF CARE
07/31/20 1522   Post-Acute Status   Post-Acute Authorization Placement   Post-Acute Placement Status Referrals Sent   Discharge Delays None known at this time   Discharge Plan   Discharge Plan A Rehab   Discharge Plan B Skilled Nursing Facility     PMR consult placed, ref sent to Amanda.    Agnes Salazar, BRAYAN [id number]

## 2020-07-31 NOTE — ASSESSMENT & PLAN NOTE
Area of cytotoxic cerebral edema identified when reviewing brain imaging in the territory of the right middle cerebral artery. There is no mass effect associated with it. We will continue to monitor the patients clinical exam for any worsening of symptoms which may indicate expansion of the stroke or the area of the edema resulting in the clinical change. The pattern is suggestive of small vessel disease vs CNS vasculitis etiology.

## 2020-07-31 NOTE — CONSULTS
[facility]  Physical Medicine & Rehab  Consult Note    Patient Name: Bessy Nunez  MRN: [id number]  Admission Date: 7/30/2020  Hospital Length of Stay: 1 days  Attending Physician: Isael Matias MD     Inpatient consult to Physical Medicine & Rehabilitation  Consult performed by: Evon Tavera NP  Consult requested by:  Isael Matias MD    Reason for Consult:  assess rehabilitation needs  Consults  Subjective:     Principal Problem: Acute right MCA stroke     HPI: Bessy uNnez is a 60-year-old male with PMHx of CNS vasculitis (on Rituxan q 16 weeks ), T2DM, & HTN.  Patient presented to [facility] on 7/30 with confusion, slurred speech, and R sided weakness. MRI brain showed an acute infarct in the right parietal lobe per stroke team. Per stroke team, etiology of his current stroke is likely secondary to CNS vasculitis > small vessel disease.     Functional History: Patient lives in [place] with wife in a single story home with 1 step to enter.  Prior to admission, (I) with ADLs and mobility. DME: none.    Hospital Course: 07/31/2020: Therapy evaluations pending.     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 Love By mri.  Several active lesions, many old. 5/13: MRA brain/neck, MRI brain w/wo contrast show no vascular occlusion, multiple foci of hyperintensity in deep cerebral periventricular white matter in pattern of demyelinating process.  5/17: MRI spine performed, no spinal cord lesions. Hospitalization 6/2017. EEG was performed negative for seizures.  Repeat MRI showing new lesion, question whether this was demyelination versus CVA. 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 Findings compatible with a small  acute lacunar infarct adjacent to the left frontal horn.  Nonspecific enhancement just inferior to this region and extending into the left basal ganglia, as well as within the inferior aspect of the left cerebellum.  No evidence of a focal mass. 2.  Extensive increased signal intensity involving the periventricular white matter compatible with demyelinating disease versus vasculitis. 3.  Clinical correlation and followup recommended. 4.  This reportedly flattened in the EPIC and the corpus callosum medical record system.    Mixed hyperlipidemia 11/9/2012    NAFLD (nonalcoholic fatty liver disease) 10/11/2013    CHASE (nonalcoholic steatohepatitis)     Obesity     Stroke     Type 2 diabetes mellitus with diabetic polyneuropathy, with long-term current use of insulin 5/14/2017    Type 2 diabetes mellitus with hyperglycemia, with long-term current use of insulin 10/11/2013     Past Surgical History:   Procedure Laterality Date    COLONOSCOPY N/A 5/21/2019    Procedure: COLONOSCOPY;  Surgeon: Alex Ascencio MD;  Location: [facility];  Service: Endoscopy;  Laterality: N/A;  confirmed appt-sp    INSERTION OF TUNNELED CENTRAL VENOUS CATHETER (CVC) WITH SUBCUTANEOUS PORT N/A 12/7/2018    Procedure: [id number];  Surgeon: Dossri Diagnostic Provider;  Location: [facility] OR [address] [place] [address];  Service: Radiology;  Laterality: N/A;    SKIN CANCER EXCISION       Review of patient's allergies indicates:  No Known Allergies    Scheduled Medications:    aspirin  81 mg Oral Daily    atorvastatin  40 mg Oral Daily    donepeziL  5 mg Oral QHS    heparin (porcine)  5,000 Units Subcutaneous Q8H    QUEtiapine  25 mg Oral QHS       PRN Medications: dextrose 50%, glucagon (human recombinant), insulin aspart U-100, labetaloL, ondansetron, sodium chloride 0.9%, sodium chloride 0.9%    Family History     Problem Relation (Age of Onset)    Cancer Father    Cataracts Mother    Diabetes Maternal Aunt    Heart disease Mother    Heart  failure Mother    Hypertension Mother    Rheum arthritis Paternal Grandmother    Stroke Sister        Tobacco Use    Smoking status: Never Smoker    Smokeless tobacco: Never Used   Substance and Sexual Activity    Alcohol use: Not Currently     Alcohol/week: 0.0 standard drinks     Frequency: Never     Drinks per session: 1 or 2     Binge frequency: Never    Drug use: No    Sexual activity: Yes     Partners: Female     Review of Systems   Constitutional: Positive for activity change. Negative for fatigue and fever.   HENT: Negative for trouble swallowing and voice change.    Eyes: Negative for photophobia and visual disturbance.   Respiratory: Negative for cough and shortness of breath.    Cardiovascular: Negative for chest pain and palpitations.   Gastrointestinal: Negative for nausea and vomiting.   Genitourinary: Negative for difficulty urinating and flank pain.   Musculoskeletal: Positive for gait problem. Negative for arthralgias.   Skin: Negative for color change and rash.   Neurological: Positive for weakness. Negative for facial asymmetry, speech difficulty and numbness.   Psychiatric/Behavioral: Positive for confusion. Negative for agitation.     Objective:     Vital Signs (Most Recent):  Temp: 98 °F (36.7 °C) (07/31/20 0738)  Pulse: 61 (07/31/20 0738)  Resp: 18 (07/31/20 0738)  BP: (!) 153/74 (07/31/20 0738)  SpO2: 97 % (07/31/20 0738)    Vital Signs (24h Range):  Temp:  [97.6 °F (36.4 °C)-98.6 °F (37 °C)] 98 °F (36.7 °C)  Pulse:  [58-65] 61  Resp:  [12-18] 18  SpO2:  [96 %-100 %] 97 %  BP: ()/(61-92) 153/74     Body mass index is 31.51 kg/m².    Physical Exam  Constitutional:       General: He is not in acute distress.     Appearance: He is well-developed.      Comments: Wife at bedside   HENT:      Head: Normocephalic and atraumatic.   Eyes:      General:         Right eye: No discharge.         Left eye: No discharge.   Neck:      Musculoskeletal: Neck supple.   Pulmonary:      Effort:  Pulmonary effort is normal. No respiratory distress.   Abdominal:      General: There is no distension.      Palpations: Abdomen is soft.   Musculoskeletal:         General: No deformity.      Comments: Lifts BLE against gravity, RAMIREZ BUEs   Skin:     General: Skin is warm and dry.   Neurological:      Mental Status: He is alert. He is disoriented.      Comments: Follows commands  oriented to self, place but not year    Psychiatric:         Behavior: Behavior normal.         Cognition and Memory: Cognition is impaired.     Diagnostic Results:   Labs: Reviewed  ECG: Reviewed  X-Ray: Reviewed  CT: Reviewed  MRI: Reviewed    Assessment/Plan:     * Acute right MCA stroke  -MRI brain showed an acute infarct in the right parietal lobe per stroke team. Per stroke team, etiology of his current stroke is likely secondary to CNS vasculitis > small vessel disease.     See hospital course for functional, cognitive/speech/language, and nutrition/swallow status.    Recommendations  -  Encourage mobility, OOB in chair, and early ambulation as appropriate   -  PT, OT, SLP evaluate and treat  -  Monitor mood and sleep disturbances and establish consistent sleep-wake cycle  -  Monitor for bowel and bladder dysfunction  -  Monitor for shoulder pain/subluxation and spasticity  -  DVT prophylaxis if appropriate  -  Monitor for and prevent skin breakdown and pressure ulcers  · Early mobility, repositioning/weight shifting every 20-30 minutes when sitting, turn patient every 2 hours, proper mattress/overlay and chair cushioning, pressure relief/heel protector boots      CNS vasculitis failed imuran; failed cytoxan; 1/2020 rituxan  -dx 2017  -Dr Love in [facility]  -s/p first Rituxin 5/14/2020 and he is scheduled to receive a dose every 16 weeks    Therapy evals pending.     Thank you for your consult.     Evon Tavera NP  Department of Physical Medicine & Rehab  [facility]

## 2020-07-31 NOTE — PT/OT/SLP EVAL
"Occupational Therapy   Evaluation    Name: Bessy Nunez  MRN: [id number]  Admitting Diagnosis:  Acute right MCA stroke    Length of Stay: 1 days    Recommendations:     Discharge Recommendations: rehabilitation facility  Discharge Equipment Recommendations:  other (see comments)(TBD)  Barriers to discharge:  Decreased caregiver support(pt not at PLOF, requires increased assistance)    Plan:     Patient to be seen 4 x/week to address the above listed problems via self-care/home management, therapeutic activities, therapeutic exercises, neuromuscular re-education, cognitive retraining, sensory integration  · Plan of Care Expires: 08/30/20  · Plan of Care Reviewed with: patient, spouse    Assessment:     Bessy Nunez is a 60 y.o. male with a medical diagnosis of Acute right MCA stroke.  He presents with the following performance deficits affecting function: weakness, impaired endurance, impaired self care skills, impaired functional mobilty, gait instability, impaired balance, impaired cognition, decreased coordination, decreased upper extremity function, decreased lower extremity function, decreased safety awareness, impaired coordination, impaired fine motor, impaired cardiopulmonary response to activity.      Rehab Prognosis: Good; patient would benefit from acute skilled OT services to address these deficits and reach maximum level of function.       Subjective   Communicated with: RN prior to session.  Patient found HOB elevated with bed alarm, telemetry upon OT entry to room.    Chief Complaint: Extremity Weakness (woke up 9am, history of stroke, right sided weakness, slurred speech)    Patient/Family Comments/goals: pt with flat affect and literal thinking, upon sitting EOB and asked how he's feeling, pt stated "different. i'm sitting now."  Pt with impaired insight into visible fatigue and multi-directional LOB in standing at sink, eyes closing slowly, required cue questions about fatigue level to state "yeah, " "i'm tired"      Pain/Comfort:  · Pain Rating 1: 0/10(flat affect, denied pain, no sign of discomfort)  · Pain Rating Post-Intervention 1: 0/10    Patients cultural, spiritual, Latter day conflicts given the current situation: no    Occupational Profile:  Living Environment: Pt lives with wife in 2STH, all bed/bath on 1st floor, ledge entrance, TBS with tub bench.   Prior Level of Function: Patient reports being Mod I, difficulty with balance and fine motor/gross motor with mobility & with ADLs.   Patient uses DME as follows: bedside commode, cane, quad, cane, straight, rollator, bath bench.   DME owned (not currently used): none.  Roles/Repsonsibilities:   Hand Dominance: right   Work: retired from working at [employer] for over 45 years.   Drive: no.   Managing Medicines/Managing Home: no.   Hobbies: enjoys watching TV.  Equipment Used at Home:  bedside commode, cane, quad, cane, straight, rollator, bath bench    Patient reports they will have assistance from Wife upon discharge.      Objective:     Patient found with: bed alarm, telemetry   General Precautions: Standard, Cardiac aphasia, aspiration, fall   Orthopedic Precautions:N/A   Braces: N/A   Oxygen Device: Room Air  Vitals: BP (!) 153/74 (BP Location: Left arm, Patient Position: Lying)   Pulse 61   Temp 98 °F (36.7 °C) (Oral)   Resp 18   Wt 99.6 kg (219 lb 9.3 oz)   SpO2 97%   BMI 31.51 kg/m²     Cognitive and Psychosocial Function:   · AxOx4 -- Person, Place, Time and Situation   · Follows Commands/attention:inattentive and follows one-step commands with cues for initiation  · Communication:  expressive aphasia and receptive aphasia  · Memory: Impaired STM and Poor immediate recall  · Safety awareness/insight to disability: impaired   · Mood/Affect/Coping skills/emotional control: Cooperative and Flat affect    Hearing: Intact    Vision:  Intact visual fields    Physical Exam:  Postural examination/scapula alignment:    -       Rounded shoulders  -       " Forward head  -       Posterior pelvic tilt  -       Kyphosis  Skin integrity: Visible skin intact     Left UE Right UE   UE Edema absent absent   UE ROM AROM WFL AROM WFL   UE Strength adequate ROM, adequate strength adequate ROM, decreased strength    Strength grasp WNL grasp WFL   Sensation LUE INTACT:WFL RUE IMPAIRED: proprioception   Fine Motor Coordination:  LUE INTACT: WFL RUE IMPAIRED: hand thumb/finger opposition skills  and manipulation of objects   Gross Motor Coordination: LUE INTACT: WFL RUE IMPAIRED:WFL, limited by fatigue and impaired functional endurance     Occupational Performance:  Bed Mobility:    · Patient completed Rolling/Turning to Left with  contact guard assistance and increased time  · Scooting to HOB in supine: contact guard assistance  · Patient completed Supine to Sit with contact guard assistance and increased timing, cues for intiation on L side of bed  · Scooting anteriorly to EOB to have both feet planted on floor: contact guard assistance and pt required multiple cues to bring R foot to floor, pt with impaired sitting stance, unable to self-correct  · Patient completed Sit to Supine with contact guard assistance on L side of bed    Functional Mobility/Transfers:  -Static Sitting EOB: CGA. Cues to situate both feet flat on floor required, increased time required. Pt with kyphotic posture.   -Dynamic Sitting EOB: CGA  -Static Stand: close CGA in standing at sink, pt with multi-directional swaying, pt with R knee buckling, able to correct/catch self using counter of sink/RW.   -Patient completed Sit <> Stand Transfer with contact guard assistance  with  no assistive device - pt improved with balance during use of RW  -Functional Mobility: [place] with RW 2* impaired safety insight, short,shuffled gait, impaired balance, multi-directional instability.      Activities of Daily Living:  · Grooming: minimum assistance and moderate assistance pt unable to sequence fine motor to open  toothpaste, required cues for initiation and sequencing of task.  Pt with flat affect throughout.  Pt with impaired brushing, eyes closing in standing, required cues to attend to visible fatigue - impaired safety insight.  Pt assisted back to room with RW.   · Lower Body Dressing: contact guard assistance donning/doffing socks seated EOB, impaired FM. increased time required.   · Toileting: condom cath in place        AMPAC 6 Click ADL:  AMPA Total Score: 16    Treatment & Education:  -Pt education on OT role and POC   -Importance of E/OOB activity with staff assistance  -Multiple self-care tasks and functional mobility completed -- assistance level noted above  -Safety during functional transfer and mobility ensured  -White board updated, orientation assessed and provided  -Education provided reviewed, questions answered within OT scope of practice.     Patient left HOB elevated with all lines intact, call button in reach, bed alarm on, RN notified and wife and MD present    GOALS:   Multidisciplinary Problems     Occupational Therapy Goals        Problem: Occupational Therapy Goal    Goal Priority Disciplines Outcome Interventions   Occupational Therapy Goal     OT, PT/OT Ongoing, Progressing    Description: Goals set on 7/31 with expiration date 8/14:  Patient will increase functional independence with ADLs by performing:    Supine <> Sit with Stand-by Assistance.  Feeding with Stand-by Assistance.  Grooming while standing at sink with Stand-by Assistance.  UB Dressing with Stand-by Assistance.  LB Dressing with Stand-by Assistance.  Step transfer with Stand-by Assistance with DME as needed.  Pt will demonstrate understanding of education provided regarding energy conservation and task modification through teach-back method.                          History:     Past Medical History:   Diagnosis Date    Amblyopia     Cataract     CNS vasculitis 6/2/2017    Follows with Dr. Love By mri.  Several active  lesions, many old. 5/13: MRA brain/neck, MRI brain w/wo contrast show no vascular occlusion, multiple foci of hyperintensity in deep cerebral periventricular white matter in pattern of demyelinating process.  5/17: MRI spine performed, no spinal cord lesions. Hospitalization 6/2017. EEG was performed negative for seizures.  Repeat MRI showing new lesion, question whether this was demyelination versus CVA. 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 Findings compatible with a small acute lacunar infarct adjacent to the left frontal horn.  Nonspecific enhancement just inferior to this region and extending into the left basal ganglia, as well as within the inferior aspect of the left cerebellum.  No evidence of a focal mass. 2.  Extensive increased signal intensity involving the periventricular white matter compatible with demyelinating disease versus vasculitis. 3.  Clinical correlation and followup recommended. 4.  This reportedly flattened in the EPIC and the corpus callosum medical record system.    Mixed hyperlipidemia 11/9/2012    NAFLD (nonalcoholic fatty liver disease) 10/11/2013    CHASE (nonalcoholic steatohepatitis)     Obesity     Stroke     Type 2 diabetes mellitus with diabetic polyneuropathy, with long-term current use of insulin 5/14/2017    Type 2 diabetes mellitus with hyperglycemia, with long-term current use of insulin 10/11/2013       Past Surgical History:   Procedure Laterality Date    COLONOSCOPY N/A 5/21/2019    Procedure: COLONOSCOPY;  Surgeon: Alex Ascencio MD;  Location: [facility];  Service: Endoscopy;  Laterality: N/A;  confirmed appt-sp    INSERTION OF TUNNELED CENTRAL VENOUS CATHETER (CVC) WITH SUBCUTANEOUS PORT N/A 12/7/2018    Procedure: [id number];   Surgeon: Luan Diagnostic Provider;  Location: [facility] OR [address] [place] [address];  Service: Radiology;  Laterality: N/A;    SKIN CANCER EXCISION         Time Tracking:       OT Date of Treatment: 07/31/20  OT Start Time: 0843  OT Stop Time: 0911  OT Total Time (min): 28 min  Additional staff present: PT      Billable Minutes:Evaluation 5  Self Care/Home Management 23      COLTON Morgan  7/31/2020

## 2020-07-31 NOTE — ASSESSMENT & PLAN NOTE
-MRI brain showed an acute infarct in the right parietal lobe per stroke team. Per stroke team, etiology of his current stroke is likely secondary to CNS vasculitis > small vessel disease.     See hospital course for functional, cognitive/speech/language, and nutrition/swallow status.    Recommendations  -  Encourage mobility, OOB in chair, and early ambulation as appropriate   -  PT, OT, SLP evaluate and treat  -  Monitor mood and sleep disturbances and establish consistent sleep-wake cycle  -  Monitor for bowel and bladder dysfunction  -  Monitor for shoulder pain/subluxation and spasticity  -  DVT prophylaxis if appropriate  -  Monitor for and prevent skin breakdown and pressure ulcers  · Early mobility, repositioning/weight shifting every 20-30 minutes when sitting, turn patient every 2 hours, proper mattress/overlay and chair cushioning, pressure relief/heel protector boots

## 2020-07-31 NOTE — PHYSICIAN QUERY
"PT Name: Bessy Nunez  MR #: [id number]    Physician Query Form - Heart  Condition Clarification     CDS/: Elyssa Murphy RN, CDS              Contact information: [email or web address]  This form is a permanent document in the medical record.     Query Date: July 31, 2020    By submitting this query, we are merely seeking further clarification of documentation. Please utilize your independent clinical judgment when addressing the question(s) below.    The medical record contains the following   Indicators     Supporting Clinical Findings Location in Medical Record    BNP     x EF --EF 60% TTE 7/31   x Radiology findings --Cardiac silhouette and pulmonary vascular distribution are normal   CXR 7/30   x Echo Results · --Technically challenging study  · Normal left ventricular systolic function. The estimated ejection fraction is 60%.  · Concentric left ventricular remodeling.  · Indeterminate left ventricular diastolic function.  · No wall motion abnormalities.  · Normal right ventricular systolic function.  · Mild left atrial enlargement.       -Normal central venous pressure (3 mmHg).    --03/09/2020 TTE concentric LV remodeling.  Normal LV systolic function LVEF 55%.  Grade 2 diastolic dysfunction.  Normal CVP.  PA pressure 20   TTE 7/31                    Vas neuro H&P 7/30      "Ascites" documented      "SOB" or "VALENTIN" documented      "Hypoxia" documented     x Heart Failure documented --Patient also has hx of multiple CVA, DM, HTN, and CHF. ED Provider Note    "Edema" documented      Diuretics/Meds      Treatment:     x Other:    CPK 71   CPK MB 1.7   MB% 2.4   Troponin I 0.009      Labs 7/31   Heart failure (HF) can be acute, chronic or both. It is generally further specificed as systolic, diastolic, or combined. Lastly, it is important to identify an underlying etiology if known or suspected.     Common clues to acute exacerbation:  Rapidly progressive symptoms (w/in 2 weeks of presentation), " using IV diuretics to treat, using supplemental O2, pulmonary edema on Xray, MI w/in 4 weeks, and/or BNP >500    Systolic Heart Failure: is defined as chart documentation of a left ventricular ejection fraction (LVEF) less than 40%     Diastolic Heart Failure: is defined as a left ventricular ejection fraction (LVEF) greater than 40%   +      Evidence of diastolic dysfunction on echocardiography OR    Right heart catheterization wedge pressure above 12 mm Hg OR    Left heart catheterization left ventricular end diastolic pressure 18 mm Hg or above.    References: *American Heart Association    The clinical guidelines noted below are only system guidelines, and do not replace the providers clinical judgment.     Provider, please specify the diagnosis associated with above clinical findings    Please clarify the hx of CHF diagnosis:    [ x  ] Chronic Diastolic Heart Failure - Pre-existing diastolic HF diagnosis.  EF > 40%  without  acute HF symptoms documented     [   ] Other Type of Heart Failure (please specify type): ________________     [   ] Heart Failure Ruled Out     [   ] Other (please specify): ___________     [  ] Clinically Undetermined                           Please document in your progress notes daily for the duration of treatment until resolved and include in your discharge summary.

## 2020-07-31 NOTE — SUBJECTIVE & OBJECTIVE
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 Love By mri.  Several active lesions, many old. 5/13: MRA brain/neck, MRI brain w/wo contrast show no vascular occlusion, multiple foci of hyperintensity in deep cerebral periventricular white matter in pattern of demyelinating process.  5/17: MRI spine performed, no spinal cord lesions. Hospitalization 6/2017. EEG was performed negative for seizures.  Repeat MRI showing new lesion, question whether this was demyelination versus CVA. 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 Findings compatible with a small acute lacunar infarct adjacent to the left frontal horn.  Nonspecific enhancement just inferior to this region and extending into the left basal ganglia, as well as within the inferior aspect of the left cerebellum.  No evidence of a focal mass. 2.  Extensive increased signal intensity involving the periventricular white matter compatible with demyelinating disease versus vasculitis. 3.  Clinical correlation and followup recommended. 4.  This reportedly flattened in the EPIC and the corpus callosum medical record system.    Mixed hyperlipidemia 11/9/2012    NAFLD (nonalcoholic fatty liver disease) 10/11/2013    CHASE (nonalcoholic steatohepatitis)     Obesity     Stroke     Type 2 diabetes mellitus with diabetic polyneuropathy, with long-term current use of insulin 5/14/2017    Type 2 diabetes mellitus with hyperglycemia, with long-term current use of insulin 10/11/2013     Past Surgical History:   Procedure Laterality Date    COLONOSCOPY N/A 5/21/2019    Procedure: COLONOSCOPY;  Surgeon: Alex Ascencio MD;  Location: [facility];  Service: Endoscopy;  Laterality: N/A;   confirmed appt-sp    INSERTION OF TUNNELED CENTRAL VENOUS CATHETER (CVC) WITH SUBCUTANEOUS PORT N/A 12/7/2018    Procedure: [id number];  Surgeon: Luan Diagnostic Provider;  Location: [facility] OR [address] [place] [address];  Service: Radiology;  Laterality: N/A;    SKIN CANCER EXCISION       Review of patient's allergies indicates:  No Known Allergies    Scheduled Medications:    aspirin  81 mg Oral Daily    atorvastatin  40 mg Oral Daily    donepeziL  5 mg Oral QHS    heparin (porcine)  5,000 Units Subcutaneous Q8H    QUEtiapine  25 mg Oral QHS       PRN Medications: dextrose 50%, glucagon (human recombinant), insulin aspart U-100, labetaloL, ondansetron, sodium chloride 0.9%, sodium chloride 0.9%    Family History     Problem Relation (Age of Onset)    Cancer Father    Cataracts Mother    Diabetes Maternal Aunt    Heart disease Mother    Heart failure Mother    Hypertension Mother    Rheum arthritis Paternal Grandmother    Stroke Sister        Tobacco Use    Smoking status: Never Smoker    Smokeless tobacco: Never Used   Substance and Sexual Activity    Alcohol use: Not Currently     Alcohol/week: 0.0 standard drinks     Frequency: Never     Drinks per session: 1 or 2     Binge frequency: Never    Drug use: No    Sexual activity: Yes     Partners: Female     Review of Systems   Constitutional: Positive for activity change. Negative for fatigue and fever.   HENT: Negative for trouble swallowing and voice change.    Eyes: Negative for photophobia and visual disturbance.   Respiratory: Negative for cough and shortness of breath.    Cardiovascular: Negative for chest pain and palpitations.   Gastrointestinal: Negative for nausea and vomiting.   Genitourinary: Negative for difficulty urinating and flank pain.   Musculoskeletal: Positive for gait problem. Negative for arthralgias.   Skin: Negative for color change and rash.   Neurological: Positive for weakness. Negative for facial asymmetry, speech difficulty and  numbness.   Psychiatric/Behavioral: Positive for confusion. Negative for agitation.     Objective:     Vital Signs (Most Recent):  Temp: 98 °F (36.7 °C) (07/31/20 0738)  Pulse: 61 (07/31/20 0738)  Resp: 18 (07/31/20 0738)  BP: (!) 153/74 (07/31/20 0738)  SpO2: 97 % (07/31/20 0738)    Vital Signs (24h Range):  Temp:  [97.6 °F (36.4 °C)-98.6 °F (37 °C)] 98 °F (36.7 °C)  Pulse:  [58-65] 61  Resp:  [12-18] 18  SpO2:  [96 %-100 %] 97 %  BP: ()/(61-92) 153/74     Body mass index is 31.51 kg/m².    Physical Exam  Constitutional:       General: He is not in acute distress.     Appearance: He is well-developed.      Comments: Wife at bedside   HENT:      Head: Normocephalic and atraumatic.   Eyes:      General:         Right eye: No discharge.         Left eye: No discharge.   Neck:      Musculoskeletal: Neck supple.   Pulmonary:      Effort: Pulmonary effort is normal. No respiratory distress.   Abdominal:      General: There is no distension.      Palpations: Abdomen is soft.   Musculoskeletal:         General: No deformity.      Comments: Lifts BLE against gravity, RAMIREZ BUEs   Skin:     General: Skin is warm and dry.   Neurological:      Mental Status: He is alert. He is disoriented.      Comments: Follows commands  oriented to self, place but not year    Psychiatric:         Behavior: Behavior normal.         Cognition and Memory: Cognition is impaired.            Diagnostic Results:   Labs: Reviewed  ECG: Reviewed  X-Ray: Reviewed  CT: Reviewed  MRI: Reviewed

## 2020-07-31 NOTE — PT/OT/SLP EVAL
"Physical Therapy Evaluation    Patient Name:  Bessy Nunez   MRN:  [id number]    Recommendations:     Discharge Recommendations:  rehabilitation facility(may progress)   Discharge Equipment Recommendations: walker, rolling   Barriers to discharge: risk of falls    Assessment:     Bessy Nunez is a 60 y.o. male admitted with a medical diagnosis of Acute right MCA stroke.  He presents with the following impairments/functional limitations:  weakness, impaired endurance, impaired self care skills, impaired functional mobilty, impaired balance, gait instability, decreased coordination, decreased lower extremity function, impaired cognition, decreased safety awareness. The patient has a history of stroke 3 years prior. The patient presents with impaired safety awareness and motor planning, right sided weakness, as well as instability with gait. He ambulated 75' with RW and contact guard assist to minimum assistance for turns and navigation. PTA the patient was requiring 24 hr supervision from wife for safety, she reports that he is more unsteady with gait versus baseline.  Based on the patient's progress with therapy, motivation to participate in treatment, and prior level of independence, they are an excellent candidate for inpatient rehabilitation and they would benefit from rehab to maximize their functional potential.      Rehab Prognosis: Good; patient would benefit from acute skilled PT services to address these deficits and reach maximum level of function.    Recent Surgery: * No surgery found *      Plan:     During this hospitalization, patient to be seen 4 x/week to address the identified rehab impairments via gait training, therapeutic activities, therapeutic exercises, neuromuscular re-education and progress toward the following goals:    · Plan of Care Expires:  08/30/20    Subjective     Chief Complaint: when asked if he felt different sitting up and standing, stated "I feel different", when asked what he " "felt he stated "I feel like I'm sitting", denies dizziness/lightheadedness  Patient/Family Comments/goals: patient did not state, per wife, "I'm worried if he comes home that he'll fall"  Pain/Comfort:  · Pain Rating 1: 0/10    Patients cultural, spiritual, Shinto conflicts given the current situation: no    Living Environment:  The patient lives with his wife in a 2SH, threshold to enter (bed and bath are on 1st floor), tub-shower. The patient is R handed, has not had a recent fall.   Prior to admission, patients level of function was requiring 24 hr supervision from wife, occasional assist with ADLs, patient's wife has help at home from home health aide.  Equipment used at home: bedside commode, cane, straight, cane, quad, bath bench, rollator.  DME owned (not currently used): none, does not consistently use AD at home for gait. Upon discharge, patient will have assistance from wife, HH aide.    Objective:     Communicated with RN prior to session.  Patient found HOB elevated with bed alarm, telemetry  upon PT entry to room.    General Precautions: Standard, aspiration, fall, aphasia   Orthopedic Precautions:N/A   Braces: N/A     Exams:    Cognitive Exam  Patient is A&O x4 and follows 100% of one -step commands- needs significant cuing for navigation with gait, completion of functional tasks, poor insight into safety and motor planning   Fine Motor Coordination   -       WFL heel to shin and rapid alternating toe taps     Postural Exam Patient presented with the following abnormalities:    -       Rounded shoulders  -       Forward head  -       Kyphosis  -       Posterior pelvic tilt   Sensation    -       Light touch intact STEPHANI LE   Skin Integrity/Edema     -       Skin integrity: visibly intact  -       Edema: NA   R LE ROM WFL   R LE Strength 4-/5 hip flexion, knee ext/flex, and ankle DF/PF   L LE ROM WFL   L LE Strength  4-/5 hip flexion, 4/5 knee ext/flex, and ankle DF/PF     Balance          Static " Sitting stand by assistance    Dynamic Sitting stand by assistance    Static Standing contact guard assist with RW     Dynamic Standing contact guard assist to minimum assistance with gait with RW                  Functional Mobility:    Bed Mobility  Supine to Sit on the L side:  contact guard assist   Sit to supine: contact guard assist    Transfers Sit to Stand:  contact guard assist with RW, cues for hand placement and set up   Gait  Gait Distance: 75 ft with RW  Assistance Level: contact guard assist to minimum assistance with turns and to navigate in hallway  Description: pushing RW too far anteriorly, decreased R step length, decreased R foot clearance, pushing RW too far anteriorly, kyphotic posture, not ambulating inside RW with turns, turning to enter another patient's room, path veering to L, inconsistent use of RW    Gait training: cues to ambulate closer to RW, cues and assist for RW management with turns, cued for longer reciprocal strides          Therapeutic Activities and Exercises:   Patient safe to ambulate with RW and RN assist of 1 person, whiteboard updated, RN alerted, RW left in patient's room.   Discussed patient's deficits and fall risk with patient's spouse- discussed therapy rec of rehab but that patient may progress to needing HH with 24 hr assist, she verbalized understanding and agreement with therapy recommendation.   Patient and wife ed to call for assist to mobilize. Discussed therapy weekend schedule- no therapy over weekend; encouraged sitting up in chair 3x/day and ambulating ad tomasa.   Patient and spouse educated on role of therapy, goals of session, benefits of out of bed mobility. Patient agreeable to mobilize with therapy.  Discussed PT plan of care during hospitalization. Patient educated that they need to call for assistance to mobilize out of bed. Whiteboard updated as appropriate. Patient educated on how their diagnosis impacts their mobility within PT scope of practice.      AM-PAC 6 CLICK MOBILITY  Total Score:22     Patient left up in chair with all lines intact, call button in reach and wife present.    GOALS:   Multidisciplinary Problems     Physical Therapy Goals        Problem: Physical Therapy Goal    Goal Priority Disciplines Outcome Goal Variances Interventions   Physical Therapy Goal     PT, PT/OT Ongoing, Progressing     Description: Goals to be met by:      Patient will increase functional independence with mobility by performin. Supine to sit with Set-up [place]  2. Sit to supine with Set-up [place]  3. Sit to stand transfer with Supervision  4. Gait  x 200 feet with Supervision using Rolling Walker.   5. Lower extremity exercise program x15 reps per handout, with independence to improve strength and activity tolerance.                      History:     Past Medical History:   Diagnosis Date    Amblyopia     Cataract     CNS vasculitis 2017    Follows with Dr. Love By mri.  Several active lesions, many old. : MRA brain/neck, MRI brain w/wo contrast show no vascular occlusion, multiple foci of hyperintensity in deep cerebral periventricular white matter in pattern of demyelinating process.  : MRI spine performed, no spinal cord lesions. Hospitalization 2017. EEG was performed negative for seizures.  Repeat MRI showing new lesion, question whether this was demyelination versus CVA. Cytoxan 6/3/17 & 17    Depressive disorder, not elsewhere classified 2012    Essential hypertension 2012    Internuclear ophthalmoplegia of left eye 2017    Lacunar stroke of left subthalamic region 2017 MRI brain: 1. Findings compatible with a small acute lacunar infarct adjacent to the left frontal horn.  Nonspecific enhancement just inferior to this region and extending into the left basal ganglia, as well as within the inferior aspect of the left cerebellum.  No evidence of a focal mass. 2.  Extensive increased  signal intensity involving the periventricular white matter compatible with demyelinating disease versus vasculitis. 3.  Clinical correlation and followup recommended. 4.  This reportedly flattened in the EPIC and the corpus callosum medical record system.    Mixed hyperlipidemia 11/9/2012    NAFLD (nonalcoholic fatty liver disease) 10/11/2013    CHASE (nonalcoholic steatohepatitis)     Obesity     Stroke     Type 2 diabetes mellitus with diabetic polyneuropathy, with long-term current use of insulin 5/14/2017    Type 2 diabetes mellitus with hyperglycemia, with long-term current use of insulin 10/11/2013       Past Surgical History:   Procedure Laterality Date    COLONOSCOPY N/A 5/21/2019    Procedure: COLONOSCOPY;  Surgeon: Alex Ascencio MD;  Location: [facility];  Service: Endoscopy;  Laterality: N/A;  confirmed appt-sp    INSERTION OF TUNNELED CENTRAL VENOUS CATHETER (CVC) WITH SUBCUTANEOUS PORT N/A 12/7/2018    Procedure: [id number];  Surgeon: Luan Diagnostic Provider;  Location: [facility] OR [address] [place] [address];  Service: Radiology;  Laterality: N/A;    SKIN CANCER EXCISION         Time Tracking:     PT Received On: 07/31/20  PT Start Time: 0843     PT Stop Time: 0910  PT Total Time (min): 27 min     Billable Minutes: Evaluation 10 and Gait Training 10 (co-eval with OT)      Amirah Johnston, PT  07/31/2020

## 2020-07-31 NOTE — PLAN OF CARE
Problem: Physical Therapy Goal  Goal: Physical Therapy Goal  Description: Goals to be met by:      Patient will increase functional independence with mobility by performin. Supine to sit with Set-up [place]  2. Sit to supine with Set-up [place]  3. Sit to stand transfer with Supervision  4. Gait  x 200 feet with Supervision using Rolling Walker.   5. Lower extremity exercise program x15 reps per handout, with independence to improve strength and activity tolerance.     Outcome: Ongoing, Progressing   Evaluation completed, initiated plan of care.   Amirah Johnston, PT  2020

## 2020-07-31 NOTE — ASSESSMENT & PLAN NOTE
-- Stroke risk factor  -- A1c 7.2  -- LDSSI  -- Diabetic diet when appropriate  -- Hypoglycemia protocol

## 2020-07-31 NOTE — ASSESSMENT & PLAN NOTE
-dx 2017  -Dr Love in [place] [facility]  -s/p first Rituxin 5/14/2020 and he is scheduled to receive a dose every 16 weeks

## 2020-07-31 NOTE — CONSULTS
Food & Nutrition  Education    Diet Education: cardiac diet  Time Spent: 5 minutes  Learners: patient    Nutrition Education provided with handouts: Stroke nutrition therapy     Comments:  Pt resting in bed, bfst at bedside, % of meal. States he does not follow any diet at home, pt unaware of cardiac diet and low sodium. Discussed restrictions on diet and foods to avoid. Pt states he will follow whatever rules he needs to. Provided handouts to bedside after reviewing with pt. Pt motivated to be compliant with diet. No indications of malnutrition at this time.     All questions and concerns answered. Dietitian's contact information provided.       Follow-Up: 8/7/20    Please Re-consult as needed    Thanks!  Josette Arriola RD, LDN

## 2020-07-31 NOTE — PHYSICIAN QUERY
PT Name: Bsesy Nunez  MR #: [id number]     CDS/: Elyssa Murphy               Contact information:  This form is a permanent document in the medical record.    Query Date: July 31, 2020    Neurological Condition Clarification    By submitting this query, we are merely seeking further clarification of documentation to reflect the severity of illness of your patient. Please utilize your independent clinical judgment when addressing the question(s) below.    The Medical record reflects the following:     Indicators   Supporting Clinical Findings Location in Medical Record   x AMS, Confusion,  LOC, etc.  --His wife also reports that yesterday he seemed more confused in the sense that he was less talkative.  This morning, he was walking around the house and did not seem to know what he was doing, which is not typical for him.     --Patient alert but disoriented to time.   --Encephalopathy     - Disoriented to time today. Per wife, he has a blunt affect at baseline and is fully oriented.   ED Provider Note          Vas Neuro H&P   x Acute/Chronic Illness --Acute right MCA stroke  --history of CNS vasculitis on Rituxan  --Dementia with behavioral disturbance   Vas neuro H&P    Radiology Findings      Electrolyte Imbalance     x Medication --Aricept 10mg daily  --Rituxan. Received his first dose on 5/14/2020 and he is scheduled to receive a dose every 16 weeks.   Vas neuro H&P   x Treatment         --Delirium Precautions   VAs neuro H&P   x Other --UA neg Vas neuro Note 7/31       Encephalopathy- is a general term for any diffuse disease of the brain that alters brain function or structure. Treatment of the cognitive dysfunction varies but is ultimately dependent on the treatment of the underlying condition.    Major Symptoms of Encephalopathy - Decreased level of consciousness, fluctuating alertness/concentration, confusion, agitation, lethargy, somnolence, drowsiness, obtundation, stupor, or coma.  References:  National Institutes of Healths (NIH) National [place] of Neurological Disorders and Strokes;  HCPro 2016; Advisory Board     The noted clinical guidelines are only system guidelines and do not replace the providers clinical judgment.  Provider, please specify the diagnosis or diagnoses associated with above clinical findings.      Please further specify the Encephalopathy diagnosis:     [   ] Dementia with superimposed Encephalopathy, unspecified        [   ] Dementia with superimposed Other Encephalopathy (specify type please): _______     [x   ] Dementia only; no Encephalopathy     [   ] Other Neurological Condition- Includes Post-ictal altered mental status (please specify condition): _________     [   ]  Clinically Undetermined           Please document in your progress notes daily for the duration of treatment until resolved, and include in your discharge summary.

## 2020-07-31 NOTE — ASSESSMENT & PLAN NOTE
-- Disoriented to time 7/30. Per wife, he has a blunt affect at baseline and is fully oriented.  -- UA negative    Delirium Precautions  -Re-orient patient frequently and keep pt's whiteboard up to date with current day and team member's names  -Keep shades open/room lit during day  -Low light at night (close blinds at night)  -Low stimulation, minimize interruptions at night  -Minimize use of restraints  -Encourage family to be at bedside  -Regular bowel movements  -Avoid opiates, benzodiazepines, antihistamines, anticholinergics, hypnotics as much as possible

## 2020-07-31 NOTE — PT/OT/SLP EVAL
Speech Language Pathology Evaluation  Cognitive/Bedside Swallow    Patient Name:  Bessy Nunez   MRN:  [id number]  Admitting Diagnosis: Acute right MCA stroke    Recommendations:                  General Recommendations:  Speech/language therapy and Cognitive-linguistic therapy  Diet recommendations:  Regular, Thin   Aspiration Precautions: Standard aspiration precautions   General Precautions: Standard, aphasia, aspiration  Communication strategies:  provide increased time to answer    History:     Past Medical History:   Diagnosis Date    Amblyopia     Cataract     CNS vasculitis 6/2/2017    Follows with Dr. Love By mri.  Several active lesions, many old. 5/13: MRA brain/neck, MRI brain w/wo contrast show no vascular occlusion, multiple foci of hyperintensity in deep cerebral periventricular white matter in pattern of demyelinating process.  5/17: MRI spine performed, no spinal cord lesions. Hospitalization 6/2017. EEG was performed negative for seizures.  Repeat MRI showing new lesion, question whether this was demyelination versus CVA. 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 Findings compatible with a small acute lacunar infarct adjacent to the left frontal horn.  Nonspecific enhancement just inferior to this region and extending into the left basal ganglia, as well as within the inferior aspect of the left cerebellum.  No evidence of a focal mass. 2.  Extensive increased signal intensity involving the periventricular white matter compatible with demyelinating disease versus vasculitis. 3.  Clinical correlation and followup recommended. 4.  This reportedly flattened in the EPIC and the corpus callosum medical record system.    Mixed hyperlipidemia 11/9/2012    NAFLD (nonalcoholic fatty liver  disease) 10/11/2013    CHASE (nonalcoholic steatohepatitis)     Obesity     Stroke     Type 2 diabetes mellitus with diabetic polyneuropathy, with long-term current use of insulin 5/14/2017    Type 2 diabetes mellitus with hyperglycemia, with long-term current use of insulin 10/11/2013       Past Surgical History:   Procedure Laterality Date    COLONOSCOPY N/A 5/21/2019    Procedure: COLONOSCOPY;  Surgeon: Alex Ascencio MD;  Location: [facility];  Service: Endoscopy;  Laterality: N/A;  confirmed appt-sp    INSERTION OF TUNNELED CENTRAL VENOUS CATHETER (CVC) WITH SUBCUTANEOUS PORT N/A 12/7/2018    Procedure: [id number];  Surgeon: Luan Diagnostic Provider;  Location: [facility] OR [address] [place] [address];  Service: Radiology;  Laterality: N/A;    SKIN CANCER EXCISION         Social History: Patient lives with spouse.    Prior Intubation HX:  None this admission     Modified Barium Swallow: N/A    Prior diet: regular solids and thin liquids    Occupation/hobbies/homemaking: per spouse report, since initial stroke ~ 3 years prior pt is fully dependent on spouse for household management, meal prep finances etc. Pt will play board games with a home health aide weekly     Subjective     Pt awake and pleasant   Spouse at the bedside     Pain/Comfort:  · Pain Rating 1: 0/10  · Pain Rating Post-Intervention 1: 0/10    Objective:     Cognitive Status:    Orientation Person and Place  Memory Delayed1/3 independenlty increased to 2/3 with SLP cue   Problem Solving Categories able to provide 5 items per concrete category w/ SLP cues able to generate 1 additional item , Sequencing 25% acc w/ SLP cues and Solutions pt responses very literal and rigid warranting SLP mod-max cues to draw appropriate conculsions and optrions per each situation provided       Receptive Language:   Comprehension:   Questions Open ended questions 80% acc however notable to mention very concrete responses provided   Commands  two step basic commands 90% acc  with SLP min cues     Pragmatics:    significantly flat, inconsistent eye contact   and monotone      Expressive Language:  Verbal:    Verbal language skills were wfl with no evidence of aphasia.  Pt. Expressed their thoughts coherently in conversation with no evidence of confusion. Word finding deficits present however appearing more related to cognitive impairments and decreased organization skills       Motor Speech:  Dysarthria very mild , speech intelligiblity 100% however spouse reporting pt not yet back to baseline but verbal output has improved since pt was admitted previous day     Voice:   WFL    Visual-Spatial:  WFL    Reading:   not assessed      Written Expression:   not assessed     Oral Musculature Evaluation  · Oral Musculature: WFL  · Dentition: present and adequate  · Secretion Management: adequate  · Oral Labial Strength and Mobility: WFL  · Lingual Strength and Mobility: WFL  · Volitional Cough: strong  · Volitional Swallow: prompt  · Voice Prior to PO Intake: strong and clear    Bedside Swallow Eval:   Consistencies Assessed:  · Thin liquids 4oz via straw   · Solids entire breakfast meal      Oral Phase:   · WFL    Pharyngeal Phase:   · no overt clinical signs/symptoms of aspiration  · no overt clinical signs/symptoms of pharyngeal dysphagia    Compensatory Strategies  · None    Treatment:  Education provided to Pt re: SLP role in acute care setting, overall impressions and therapeutic goals. Whiteboard updated.    Assessment:     Bessy Nunez is a 60 y.o. male with an SLP diagnosis of Cognitive-Linguistic Impairment.      Goals:   Multidisciplinary Problems     SLP Goals        Problem: SLP Goal    Goal Priority Disciplines Outcome   SLP Goal     SLP    Description: Speech Language Pathology Goals  Goals expected to be met by 8/7  1.Pt will tolerate diet of thin liqiuds,and solids without overt clinical signs of aspiration   2. Pt will recall 2/3 items w/ delay to enhance memory   3. Pt will  complete problem solving skills w/ 75 % acc independently to enhance safety awareness   4. Pt will generate 10 items per category to enhance organizations skills   5. Pt will participate in ongoing assessment of speech language and cognitive linguistic skills to help rule out deficits and determine therapeutic plan of care                        Plan:     · Patient to be seen:  4 x/week   · Plan of Care expires:  08/29/20  · Plan of Care reviewed with:  patient, spouse   · SLP Follow-Up:     Yes      Discharge recommendations:    HH vs. Rehab   Barriers to Discharge:  Level of Skilled Assistance Needed      Time Tracking:     SLP Treatment Date:   07/31/20  Speech Start Time:  0919  Speech Stop Time:  0933     Speech Total Time (min):  14 min    Billable Minutes: Eval 7  and Eval Swallow and Oral Function 7    Jagruti Pacheco CCC-SLP  07/31/2020

## 2020-08-01 LAB
ALBUMIN SERPL BCP-MCNC: 3.4 G/DL (ref 3.5–5.2)
ALP SERPL-CCNC: 79 U/L (ref 55–135)
ALT SERPL W/O P-5'-P-CCNC: 21 U/L (ref 10–44)
ANION GAP SERPL CALC-SCNC: 9 MMOL/L (ref 8–16)
AST SERPL-CCNC: 24 U/L (ref 10–40)
BASOPHILS # BLD AUTO: 0.02 K/UL (ref 0–0.2)
BASOPHILS NFR BLD: 0.4 % (ref 0–1.9)
BILIRUB SERPL-MCNC: 0.4 MG/DL (ref 0.1–1)
BUN SERPL-MCNC: 33 MG/DL (ref 8–23)
CALCIUM SERPL-MCNC: 8.8 MG/DL (ref 8.7–10.5)
CHLORIDE SERPL-SCNC: 111 MMOL/L (ref 95–110)
CO2 SERPL-SCNC: 21 MMOL/L (ref 23–29)
CREAT SERPL-MCNC: 1.5 MG/DL (ref 0.5–1.4)
DIFFERENTIAL METHOD: ABNORMAL
EOSINOPHIL # BLD AUTO: 0.1 K/UL (ref 0–0.5)
EOSINOPHIL NFR BLD: 1.7 % (ref 0–8)
ERYTHROCYTE [DISTWIDTH] IN BLOOD BY AUTOMATED COUNT: 13.9 % (ref 11.5–14.5)
EST. GFR  (AFRICAN AMERICAN): 56.9 ML/MIN/1.73 M^2
EST. GFR  (NON AFRICAN AMERICAN): 49.2 ML/MIN/1.73 M^2
GLUCOSE SERPL-MCNC: 137 MG/DL (ref 70–110)
HCT VFR BLD AUTO: 34.8 % (ref 40–54)
HGB BLD-MCNC: 10.9 G/DL (ref 14–18)
IMM GRANULOCYTES # BLD AUTO: 0.03 K/UL (ref 0–0.04)
IMM GRANULOCYTES NFR BLD AUTO: 0.6 % (ref 0–0.5)
LYMPHOCYTES # BLD AUTO: 1.4 K/UL (ref 1–4.8)
LYMPHOCYTES NFR BLD: 26 % (ref 18–48)
MCH RBC QN AUTO: 29.1 PG (ref 27–31)
MCHC RBC AUTO-ENTMCNC: 31.3 G/DL (ref 32–36)
MCV RBC AUTO: 93 FL (ref 82–98)
MONOCYTES # BLD AUTO: 0.7 K/UL (ref 0.3–1)
MONOCYTES NFR BLD: 12.7 % (ref 4–15)
NEUTROPHILS # BLD AUTO: 3.2 K/UL (ref 1.8–7.7)
NEUTROPHILS NFR BLD: 58.6 % (ref 38–73)
NRBC BLD-RTO: 0 /100 WBC
PLATELET # BLD AUTO: 132 K/UL (ref 150–350)
PMV BLD AUTO: 9.2 FL (ref 9.2–12.9)
POCT GLUCOSE: 122 MG/DL (ref 70–110)
POCT GLUCOSE: 224 MG/DL (ref 70–110)
POTASSIUM SERPL-SCNC: 4.3 MMOL/L (ref 3.5–5.1)
PROT SERPL-MCNC: 6.3 G/DL (ref 6–8.4)
RBC # BLD AUTO: 3.74 M/UL (ref 4.6–6.2)
SODIUM SERPL-SCNC: 141 MMOL/L (ref 136–145)
WBC # BLD AUTO: 5.42 K/UL (ref 3.9–12.7)

## 2020-08-01 PROCEDURE — 25000003 PHARM REV CODE 250: Performed by: FAMILY MEDICINE

## 2020-08-01 PROCEDURE — 80053 COMPREHEN METABOLIC PANEL: CPT

## 2020-08-01 PROCEDURE — 99233 PR SUBSEQUENT HOSPITAL CARE,LEVL III: ICD-10-PCS | Mod: GC,,, | Performed by: PSYCHIATRY & NEUROLOGY

## 2020-08-01 PROCEDURE — 99233 SBSQ HOSP IP/OBS HIGH 50: CPT | Mod: GC,,, | Performed by: PSYCHIATRY & NEUROLOGY

## 2020-08-01 PROCEDURE — 97535 SELF CARE MNGMENT TRAINING: CPT

## 2020-08-01 PROCEDURE — 36415 COLL VENOUS BLD VENIPUNCTURE: CPT

## 2020-08-01 PROCEDURE — 11000001 HC ACUTE MED/SURG PRIVATE ROOM

## 2020-08-01 PROCEDURE — 63600175 PHARM REV CODE 636 W HCPCS: Performed by: STUDENT IN AN ORGANIZED HEALTH CARE EDUCATION/TRAINING PROGRAM

## 2020-08-01 PROCEDURE — 94761 N-INVAS EAR/PLS OXIMETRY MLT: CPT

## 2020-08-01 PROCEDURE — 85025 COMPLETE CBC W/AUTO DIFF WBC: CPT

## 2020-08-01 PROCEDURE — 25000003 PHARM REV CODE 250: Performed by: STUDENT IN AN ORGANIZED HEALTH CARE EDUCATION/TRAINING PROGRAM

## 2020-08-01 RX ORDER — CLOPIDOGREL BISULFATE 75 MG/1
75 TABLET ORAL DAILY
Status: DISCONTINUED | OUTPATIENT
Start: 2020-08-01 | End: 2020-08-03 | Stop reason: HOSPADM

## 2020-08-01 RX ADMIN — ASPIRIN 81 MG: 81 TABLET, COATED ORAL at 08:08

## 2020-08-01 RX ADMIN — INSULIN ASPART 2 UNITS: 100 INJECTION, SOLUTION INTRAVENOUS; SUBCUTANEOUS at 11:08

## 2020-08-01 RX ADMIN — CLOPIDOGREL 75 MG: 75 TABLET, FILM COATED ORAL at 11:08

## 2020-08-01 RX ADMIN — DONEPEZIL HYDROCHLORIDE 5 MG: 5 TABLET, FILM COATED ORAL at 09:08

## 2020-08-01 RX ADMIN — HEPARIN SODIUM 5000 UNITS: 5000 INJECTION INTRAVENOUS; SUBCUTANEOUS at 02:08

## 2020-08-01 RX ADMIN — QUETIAPINE FUMARATE 25 MG: 25 TABLET ORAL at 09:08

## 2020-08-01 RX ADMIN — HEPARIN SODIUM 5000 UNITS: 5000 INJECTION INTRAVENOUS; SUBCUTANEOUS at 06:08

## 2020-08-01 RX ADMIN — HEPARIN SODIUM 5000 UNITS: 5000 INJECTION INTRAVENOUS; SUBCUTANEOUS at 09:08

## 2020-08-01 RX ADMIN — ATORVASTATIN CALCIUM 40 MG: 20 TABLET, FILM COATED ORAL at 08:08

## 2020-08-01 NOTE — SUBJECTIVE & OBJECTIVE
Neurologic Chief Complaint: worsening confusion, slurred speech and right side weakness    Subjective:     Interval History: Patient is seen for follow-up neurological assessment and treatment recommendations:     Echo complete. Therapy recommending rehab placement. Started Plavix today.     HPI, Past Medical, Family, and Social History remains the same as documented in the initial encounter.     Review of Systems   Constitutional: Negative for chills and fever.   HENT: Negative for trouble swallowing.    Eyes: Negative for visual disturbance.   Respiratory: Negative for shortness of breath.    Cardiovascular: Negative for chest pain.   Gastrointestinal: Negative for diarrhea, nausea and vomiting.   Neurological: Positive for speech difficulty. Negative for facial asymmetry, weakness, numbness and headaches.   Psychiatric/Behavioral: Negative for confusion and decreased concentration.     Scheduled Meds:   aspirin  81 mg Oral Daily    atorvastatin  40 mg Oral Daily    clopidogreL  75 mg Oral Daily    donepeziL  5 mg Oral QHS    heparin (porcine)  5,000 Units Subcutaneous Q8H    QUEtiapine  25 mg Oral QHS     Continuous Infusions:   sodium chloride 0.9%       PRN Meds:dextrose 50%, glucagon (human recombinant), insulin aspart U-100, labetaloL, ondansetron, sodium chloride 0.9%, sodium chloride 0.9%    Objective:     Vital Signs (Most Recent):  Temp: 97.6 °F (36.4 °C) (08/01/20 1138)  Pulse: 68 (08/01/20 1138)  Resp: 18 (08/01/20 1138)  BP: (!) 157/82 (08/01/20 1138)  SpO2: 95 % (08/01/20 1138)  BP Location: Left arm    Vital Signs Range (Last 24H):  Temp:  [97.6 °F (36.4 °C)-98.5 °F (36.9 °C)]   Pulse:  [65-74]   Resp:  [16-18]   BP: (120-157)/(58-85)   SpO2:  [93 %-97 %]   BP Location: Left arm    Physical Exam  Vitals signs reviewed.   Constitutional:       Appearance: Normal appearance.   HENT:      Head: Normocephalic.   Eyes:      Pupils: Pupils are equal, round, and reactive to light.   Cardiovascular:       Rate and Rhythm: Normal rate.   Pulmonary:      Effort: Pulmonary effort is normal.   Skin:     General: Skin is warm and dry.   Neurological:      Mental Status: He is alert and oriented to person, place, and time.   Psychiatric:         Speech: Speech is slurred.         Neurological Exam:   LOC: alert  Attention Span: Good   Language: No aphasia  Articulation: Dysarthria  Orientation: Person, Place, Time   Visual Fields: Full  EOM (CN III, IV, VI): Full/intact  Facial Movement (CN VII): Symmetric facial expression    Motor: Arm left  Normal 5/5  Leg left  Normal 5/5  Arm right  Normal 5/5  Leg right Normal 5/5  Cebellar: No evidence of appendicular or axial ataxia  Sensation: Intact to light touch, temperature and vibration  Tone: Normal tone throughout    Laboratory:  CMP:   Recent Labs   Lab 08/01/20  0447   CALCIUM 8.8   ALBUMIN 3.4*   PROT 6.3      K 4.3   CO2 21*   *   BUN 33*   CREATININE 1.5*   ALKPHOS 79   ALT 21   AST 24   BILITOT 0.4     CBC:   Recent Labs   Lab 08/01/20  0903   WBC 5.42   RBC 3.74*   HGB 10.9*   HCT 34.8*   *   MCV 93   MCH 29.1   MCHC 31.3*     Lipid Panel:   Recent Labs   Lab 07/30/20  1305   CHOL 175   LDLCALC 65.6   HDL 55   TRIG 272*     Hgb A1C:   Recent Labs   Lab 07/30/20  1900   HGBA1C 7.2*     TSH:   Recent Labs   Lab 07/30/20  1305   TSH 3.144       Diagnostic Results     Brain/Vessel imaging:     CT Head (7/30/2020) -              Interval development of focal hypodensity right thalamus concerning for lacunar type infarction overall age indeterminate may be recent.  There is intermediate density along the posterior aspect cannot exclude petechial hemorrhage or mineralization.     No evidence for acute extra-axial hemorrhage or hydrocephalus.     Scattered patchy and confluent regions of decreased attenuation supratentorial white matter suggestive for chronic ischemic change with remote left corona radiata infarction.  In addition there are remote  bilateral cerebellar infarcts.  MRI Brain (7/24/2020) -             Acute infarct in the right corona radiata.  Chronic ischemic changes.     Cardiac Evaluation:      TTE  7/31/2020  · Technically challenging study  · Normal left ventricular systolic function. The estimated ejection fraction is 60%.  · Concentric left ventricular remodeling.  · Indeterminate left ventricular diastolic function.  · No wall motion abnormalities.  · Normal right ventricular systolic function.  · Mild left atrial enlargement.  · Normal central venous pressure (3 mmHg).     (3/9/2020) -      · Concentric left ventricular remodeling. Normal left ventricular systolic function. The estimated ejection fraction is 55%.  · Grade II (moderate) left ventricular diastolic dysfunction consistent with pseudonormalization.  · Severe left atrial enlargement.  · Normal right ventricular systolic function.  · Mild right atrial enlargement.  · Normal central venous pressure (3 mmHg).  · The estimated PA systolic pressure is 20 mmHg.

## 2020-08-01 NOTE — PLAN OF CARE
Problem: Fall Injury Risk  Goal: Absence of Fall and Fall-Related Injury  Outcome: Ongoing, Progressing  Intervention: Identify and Manage Contributors to Fall Injury Risk  Flowsheets (Taken 8/1/2020 1539)  Medication Review/Management:   medications reviewed   high risk medications identified  Intervention: Promote Injury-Free Environment  Flowsheets (Taken 8/1/2020 1539)  Safety Promotion/Fall Prevention:   assistive device/personal item within reach   side rails raised x 2  Environmental Safety Modification: assistive device/personal items within reach

## 2020-08-01 NOTE — PT/OT/SLP PROGRESS
Occupational Therapy Treatment    Patient Name:  Bessy Nunez   MRN:  [id number]  Admit Date: 7/30/2020  Admitting Diagnosis:  Acute right MCA stroke   Length of Stay: 2 days  Recent Surgery: * No surgery found *      Recommendations:     Discharge Recommendations: home health OT, home health PT, home health speech therapy(24 hr assist  -  family requesting Rehab)  Discharge Equipment Recommendations:  walker, rolling  Barriers to discharge:  Decreased caregiver support    Plan:     Patient to be seen 4 x/week to address the above listed problems via self-care/home management, therapeutic activities, therapeutic exercises, neuromuscular re-education, cognitive retraining, sensory integration  · Plan of Care Expires: 08/30/20  · Plan of Care Reviewed with: patient, spouse    Assessment:   Bessy Nunez is a 60 y.o. male with a medical diagnosis of Acute right MCA stroke.  He presents with the following performance deficits affecting function: weakness, impaired endurance, impaired self care skills, impaired functional mobilty, gait instability, impaired balance, impaired cognition, decreased upper extremity function, decreased lower extremity function, decreased safety awareness, impaired cardiopulmonary response to activity, impaired fine motor.  Pt continues to benefit from a collaborative PT/OT/SLP program to improve quality of life and focus on recovery of impairments.     Pt presents with flat affect, impaired safety insight, literal thinking and mild instability during functional mobility. Improved balance and participation in ADLs requiring CGA and RW.  Patient returning to baseline, patient's spouse present at bedside, stated that patient improved this date in speech and mobility, but continues to request Inpatient Rehab.     Once patient is medically stable, patient is safe to discharge home with continued OT services via HHOT with 24 hr assist.     Rehab Prognosis: Good; patient would benefit from acute  "skilled OT services to address these deficits and reach maximum level of function.        Subjective   Communicated with: RN prior to session.  Patient found HOB elevated with bed alarm, telemetry upon OT entry to room.    Patient: "i'm fine, i'm going to stay"  Pt toileting at toilet level.     Pain/Comfort:  · Pain Rating 1: 0/10  · Pain Rating Post-Intervention 1: 0/10    Objective:   Patient found with: bed alarm, telemetry   General Precautions: Standard, Cardiac aphasia, aspiration, fall   Orthopedic Precautions:N/A   Braces: N/A   Oxygen Device: Room Air  Vitals: BP (!) 149/58 (BP Location: Left arm, Patient Position: Lying)   Pulse 79   Temp 98.9 °F (37.2 °C) (Axillary)   Resp 18   Ht 5' 10" (1.778 m)   Wt 99.3 kg (219 lb)   SpO2 96%   BMI 31.42 kg/m²     Outcome Measures:  [facility] 6 Click ADL: 21    Cognition:   · Cooperative and Flat affect  · Command following: follows one-step commands and requires concrete directions.   · Communication: receptive aphasia and clear/fluent    Occupational Performance:  Bed Mobility:    · Patient completed Rolling/Turning to Left with  contact guard assistance  · Patient completed Rolling/Turning to Right with contact guard assistance  · Scooting to HOB in supine: contact guard assistance  · Patient completed Supine to Sit with contact guard assistance on L side of bed  · Scooting anteriorly to EOB to have both feet planted on floor: contact guard assistance    Functional Mobility/Transfers:  -Static Sitting EOB: SBA  -Dynamic Sitting EOB: CGA seated EOB  -Patient completed Sit <> Stand Transfer with contact guard assistance  with  rolling walker   -Patient completed Bed <> Chair Transfer using Step Transfer technique with contact guard assistance with rolling walker  -Patient completed Toilet Transfer Step Transfer technique with contact guard assistance with  rolling walker  -Functional Mobility: pt ambulated to bathroom to complete toileting at toilet level with " CGA, performed grooming/self-care with CGA at sink level using RW to ambulate.     Activities of Daily Living:  · Grooming: contact guard assistance EOB - requires supervision and cues to attend to task.   · Toileting: contact guard assistance at toilet level    AMPA 6 Click ADL:  AMPAC Total Score: 21    Treatment & Education:  -Pt education on OT role and POC   -Importance of E/OOB activity with staff assistance  -Multiple self-care tasks and functional mobility completed -- assistance level noted above  -Safety during functional transfer and mobility ensured  -White board updated, orientation assessed and provided  -Education provided reviewed, questions answered within OT scope of practice.     Patient left at toilet level with all lines intact, call button in reach, RN notified, wife and RN present and call light at toilet level presetn    GOALS:   Multidisciplinary Problems     Occupational Therapy Goals        Problem: Occupational Therapy Goal    Goal Priority Disciplines Outcome Interventions   Occupational Therapy Goal     OT, PT/OT Ongoing, Progressing    Description: Goals set on 7/31 with expiration date 8/14:  Patient will increase functional independence with ADLs by performing:    Supine <> Sit with Stand-by Assistance.  Feeding with Stand-by Assistance.  Grooming while standing at sink with Stand-by Assistance.  UB Dressing with Stand-by Assistance.  LB Dressing with Stand-by Assistance.  Step transfer with Stand-by Assistance with DME as needed.  Pt will demonstrate understanding of education provided regarding energy conservation and task modification through teach-back method.                          Time Tracking:     OT Date of Treatment: 08/01/20  OT Start Time: 0956  OT Stop Time: 1011  OT Total Time (min): 15 min  Additional staff present: RN      Billable Minutes:Self Care/Home Management 15      COLTON Morgan  8/1/2020

## 2020-08-01 NOTE — PROGRESS NOTES
[facility]  Vascular Neurology  Comprehensive Stroke Center  Progress Note    Assessment/Plan:     * Acute right MCA stroke  59 y/o male with history of CNS vasculitis on Rituxan, T2DM, HTN, who presented with confusion, slurred speech and right sided weakness. NIHSS 2. LKN the evening of 7/29.   MRI brain showed an acute infarct in the right parietal lobe. TTE EF 60 %; mild left atrial enlargement.     Etiology of his current stroke is likely small vessel disease vs. CNS vasculitis. Infarct in small periventricular area in R MCA medullary  branch likely d/t traditional small artery occlusion rather than resurgence of vasculitis.     Antithrombotics for secondary stroke prevention: Antiplatelets: Aspirin: 81 mg daily  Clopidogrel: 75 mg daily    Statins for secondary stroke prevention and hyperlipidemia, if present:   Statins: Atorvastatin- 40 mg daily     Aggressive risk factor modification: HTN, DM, HLD, Exercise, Obesity     Rehab efforts: The patient has been evaluated by a stroke team provider and the therapy needs have been fully considered based off the presenting complaints and exam findings. The following therapy evaluations are needed: PT evaluate and treat, OT evaluate and treat, SLP evaluate and treat: rehab (may progress)     Diagnostics ordered/pending: None     VTE prophylaxis: Heparin 5000 units SQ every 8 hours, SCDs    BP parameters: SBP < 180        Cytotoxic cerebral edema  Area of cytotoxic cerebral edema identified when reviewing brain imaging in the territory of the right middle cerebral artery. There is no mass effect associated with it. We will continue to monitor the patients clinical exam for any worsening of symptoms which may indicate expansion of the stroke or the area of the edema resulting in the clinical change. The pattern is suggestive of small vessel disease vs CNS vasculitis etiology.        Dementia with behavioral disturbance  -- Continue Aricept  10mg daily    CNS vasculitis failed imuran; failed cytoxan; 1/2020 rituxan  Was previously diagnosed with CNS vasculitis in 2017. Angiogram showed multifocal areas of narrowing in the anterior and posterior circulation. LP showed an elevated protein (93). Currently follows with Dr Love in [place] [facility]. Has previously received Cytoxan to which he was responding well. However due to concern for long term exposure to this drug, he was transitioned to Rituxan. Received his first dose on 5/14/2020 and he is scheduled to receive a dose every 16 weeks.    Per Dr. Matias: Infarct in small periventricular area in R MCA medullary  branch likely d/t traditional small artery occlusion rather than resurgence of vasculitis - discussed case w/ Dr. Love who is his neuroimmunologist and knows him well. If he decompensates or strokes again in the near future then the possibility of vasculitis related event increases       Encephalopathy  -- Disoriented to time 7/30. Per wife, he has a blunt affect at baseline and is fully oriented.  -- UA negative    Delirium Precautions  -Re-orient patient frequently and keep pt's whiteboard up to date with current day and team member's names  -Keep shades open/room lit during day  -Low light at night (close blinds at night)  -Low stimulation, minimize interruptions at night  -Minimize use of restraints  -Encourage family to be at bedside  -Regular bowel movements  -Avoid opiates, benzodiazepines, antihistamines, anticholinergics, hypnotics as much as possible    Type 2 diabetes mellitus with diabetic polyneuropathy, with long-term current use of insulin  -- Stroke risk factor  -- A1c 7.2  -- LDSSI  -- Diabetic diet when appropriate  -- Hypoglycemia protocol     JOHN with CPAP at night  -- Stroke risk factor  -- CPAP as needed    Essential hypertension 03/09/2020 TTE concentric LV remodeling.  Normal LV systolic function LVEF 55%.  Grade 2 diastolic dysfunction.  Normal CVP.  PA pressure  20.  -- Stroke risk factor  -- SBP< 180 for now    Mixed hyperlipidemia  -- Stroke risk factor  -- LDL 65  -- Continue Lipitor 40mg           7/31: Oriented x 3 today. Mild dysarthria. ASA 81 mg, Lipitor 40 mg. Echo and PT/OT/SLP eval pending. Staff to staff conversation regarding CNS vasculitis pending.   8/1: Echo complete. Therapy recommending rehab placement. Started Plavix today.     STROKE DOCUMENTATION   Acute Stroke Times   Last Known Normal Date: 07/29/20  Last Known Normal Time: (Sometime yesterday evening)  Symptom Onset Date: 07/30/20  Symptom Onset Time: 0930  Stroke Team Called Date: 07/30/20  Stroke Team Called Time: 1226  Stroke Team Arrival Date: 07/30/20  Stroke Team Arrival Time: 1226  CT Interpretation Time: 1239    NIH Scale:  1a. Level of Consciousness: 0-->Alert, keenly responsive  1b. LOC Questions: 0-->Answers both questions correctly  1c. LOC Commands: 0-->Performs both tasks correctly  2. Best Gaze: 0-->Normal  3. Visual: 0-->No visual loss  4. Facial Palsy: 0-->Normal symmetrical movements  5a. Motor Arm, Left: 0-->No drift, limb holds 90 (or 45) degrees for full 10 secs  5b. Motor Arm, Right: 0-->No drift, limb holds 90 (or 45) degrees for full 10 secs  6a. Motor Leg, Left: 0-->No drift, leg holds 30 degree position for full 5 secs  6b. Motor Leg, Right: 0-->No drift, leg holds 30 degree position for full 5 secs  7. Limb Ataxia: 0-->Absent  8. Sensory: 0-->Normal, no sensory loss  9. Best Language: 0-->No aphasia, normal  10. Dysarthria: 1-->Mild-to-moderate dysarthria, patient slurs at least some words and, at worst, can be understood with some difficulty  11. Extinction and Inattention (formerly Neglect): 0-->No abnormality  Total (NIH Stroke Scale): 1       Modified [place] Score: 0  Jarod Coma Scale:    ABCD2 Score:    [id number] Score:   HAS -BLED Score:   ICH Score:   Hunt & Wilson Classification:      Hemorrhagic change of an Ischemic Stroke: Does this patient have an ischemic  stroke with hemorrhagic changes? No     Neurologic Chief Complaint: worsening confusion, slurred speech and right side weakness    Subjective:     Interval History: Patient is seen for follow-up neurological assessment and treatment recommendations:     Echo complete. Therapy recommending rehab placement. Started Plavix today.     HPI, Past Medical, Family, and Social History remains the same as documented in the initial encounter.     Review of Systems   Constitutional: Negative for chills and fever.   HENT: Negative for trouble swallowing.    Eyes: Negative for visual disturbance.   Respiratory: Negative for shortness of breath.    Cardiovascular: Negative for chest pain.   Gastrointestinal: Negative for diarrhea, nausea and vomiting.   Neurological: Positive for speech difficulty. Negative for facial asymmetry, weakness, numbness and headaches.   Psychiatric/Behavioral: Negative for confusion and decreased concentration.     Scheduled Meds:   aspirin  81 mg Oral Daily    atorvastatin  40 mg Oral Daily    clopidogreL  75 mg Oral Daily    donepeziL  5 mg Oral QHS    heparin (porcine)  5,000 Units Subcutaneous Q8H    QUEtiapine  25 mg Oral QHS     Continuous Infusions:   sodium chloride 0.9%       PRN Meds:dextrose 50%, glucagon (human recombinant), insulin aspart U-100, labetaloL, ondansetron, sodium chloride 0.9%, sodium chloride 0.9%    Objective:     Vital Signs (Most Recent):  Temp: 97.6 °F (36.4 °C) (08/01/20 1138)  Pulse: 68 (08/01/20 1138)  Resp: 18 (08/01/20 1138)  BP: (!) 157/82 (08/01/20 1138)  SpO2: 95 % (08/01/20 1138)  BP Location: Left arm    Vital Signs Range (Last 24H):  Temp:  [97.6 °F (36.4 °C)-98.5 °F (36.9 °C)]   Pulse:  [65-74]   Resp:  [16-18]   BP: (120-157)/(58-85)   SpO2:  [93 %-97 %]   BP Location: Left arm    Physical Exam  Vitals signs reviewed.   Constitutional:       Appearance: Normal appearance.   HENT:      Head: Normocephalic.   Eyes:      Pupils: Pupils are equal, round,  and reactive to light.   Cardiovascular:      Rate and Rhythm: Normal rate.   Pulmonary:      Effort: Pulmonary effort is normal.   Skin:     General: Skin is warm and dry.   Neurological:      Mental Status: He is alert and oriented to person, place, and time.   Psychiatric:         Speech: Speech is slurred.         Neurological Exam:   LOC: alert  Attention Span: Good   Language: No aphasia  Articulation: Dysarthria  Orientation: Person, Place, Time   Visual Fields: Full  EOM (CN III, IV, VI): Full/intact  Facial Movement (CN VII): Symmetric facial expression    Motor: Arm left  Normal 5/5  Leg left  Normal 5/5  Arm right  Normal 5/5  Leg right Normal 5/5  Cebellar: No evidence of appendicular or axial ataxia  Sensation: Intact to light touch, temperature and vibration  Tone: Normal tone throughout    Laboratory:  CMP:   Recent Labs   Lab 08/01/20  0447   CALCIUM 8.8   ALBUMIN 3.4*   PROT 6.3      K 4.3   CO2 21*   *   BUN 33*   CREATININE 1.5*   ALKPHOS 79   ALT 21   AST 24   BILITOT 0.4     CBC:   Recent Labs   Lab 08/01/20  0903   WBC 5.42   RBC 3.74*   HGB 10.9*   HCT 34.8*   *   MCV 93   MCH 29.1   MCHC 31.3*     Lipid Panel:   Recent Labs   Lab 07/30/20  1305   CHOL 175   LDLCALC 65.6   HDL 55   TRIG 272*     Hgb A1C:   Recent Labs   Lab 07/30/20  1900   HGBA1C 7.2*     TSH:   Recent Labs   Lab 07/30/20  1305   TSH 3.144       Diagnostic Results     Brain/Vessel imaging:     CT Head (7/30/2020) -              Interval development of focal hypodensity right thalamus concerning for lacunar type infarction overall age indeterminate may be recent.  There is intermediate density along the posterior aspect cannot exclude petechial hemorrhage or mineralization.     No evidence for acute extra-axial hemorrhage or hydrocephalus.     Scattered patchy and confluent regions of decreased attenuation supratentorial white matter suggestive for chronic ischemic change with remote left corona radiata  infarction.  In addition there are remote bilateral cerebellar infarcts.  MRI Brain (7/24/2020) -             Acute infarct in the right corona radiata.  Chronic ischemic changes.     Cardiac Evaluation:      TTE  7/31/2020  · Technically challenging study  · Normal left ventricular systolic function. The estimated ejection fraction is 60%.  · Concentric left ventricular remodeling.  · Indeterminate left ventricular diastolic function.  · No wall motion abnormalities.  · Normal right ventricular systolic function.  · Mild left atrial enlargement.  · Normal central venous pressure (3 mmHg).     (3/9/2020) -      · Concentric left ventricular remodeling. Normal left ventricular systolic function. The estimated ejection fraction is 55%.  · Grade II (moderate) left ventricular diastolic dysfunction consistent with pseudonormalization.  · Severe left atrial enlargement.  · Normal right ventricular systolic function.  · Mild right atrial enlargement.  · Normal central venous pressure (3 mmHg).  · The estimated PA systolic pressure is 20 mmHg.           Alfa Stephenson NP  Comprehensive Stroke Center  Department of Vascular Neurology   [facility]

## 2020-08-01 NOTE — ASSESSMENT & PLAN NOTE
61 y/o male with history of CNS vasculitis on Rituxan, T2DM, HTN, who presented with confusion, slurred speech and right sided weakness. NIHSS 2. LKN the evening of 7/29.   MRI brain showed an acute infarct in the right parietal lobe. TTE EF 60 %; mild left atrial enlargement.     Etiology of his current stroke is likely small vessel disease vs. CNS vasculitis. Infarct in small periventricular area in R MCA medullary  branch likely d/t traditional small artery occlusion rather than resurgence of vasculitis.     Antithrombotics for secondary stroke prevention: Antiplatelets: Aspirin: 81 mg daily  Clopidogrel: 75 mg daily    Statins for secondary stroke prevention and hyperlipidemia, if present:   Statins: Atorvastatin- 40 mg daily     Aggressive risk factor modification: HTN, DM, HLD, Exercise, Obesity     Rehab efforts: The patient has been evaluated by a stroke team provider and the therapy needs have been fully considered based off the presenting complaints and exam findings. The following therapy evaluations are needed: PT evaluate and treat, OT evaluate and treat, SLP evaluate and treat: rehab (may progress)     Diagnostics ordered/pending: None     VTE prophylaxis: Heparin 5000 units SQ every 8 hours, SCDs    BP parameters: SBP < 180

## 2020-08-01 NOTE — ASSESSMENT & PLAN NOTE
Was previously diagnosed with CNS vasculitis in 2017. Angiogram showed multifocal areas of narrowing in the anterior and posterior circulation. LP showed an elevated protein (93). Currently follows with Dr Love in [place] [facility]. Has previously received Cytoxan to which he was responding well. However due to concern for long term exposure to this drug, he was transitioned to Rituxan. Received his first dose on 5/14/2020 and he is scheduled to receive a dose every 16 weeks.    Per Dr. Matias: Infarct in small periventricular area in R MCA medullary  branch likely d/t traditional small artery occlusion rather than resurgence of vasculitis - discussed case w/ Dr. Love who is his neuroimmunologist and knows him well. If he decompensates or strokes again in the near future then the possibility of vasculitis related event increases

## 2020-08-01 NOTE — CONSULTS
Inpatient consult to Physical Medicine Rehab  Consult performed by: Evon Tavera NP  Consult ordered by: Isael Matias MD  Reason for consult: assess rehab needs      PM&R already following. Please see consult note.       GABRIEL Arriaza, FNP-C  Physical Medicine & Rehabilitation   08/01/2020

## 2020-08-01 NOTE — PLAN OF CARE
Problem: Occupational Therapy Goal  Goal: Occupational Therapy Goal  Description: Goals set on 7/31 with expiration date 8/14:  Patient will increase functional independence with ADLs by performing:    Supine <> Sit with Stand-by Assistance.  Feeding with Stand-by Assistance.  Grooming while standing at sink with Stand-by Assistance.  UB Dressing with Stand-by Assistance.  LB Dressing with Stand-by Assistance.  Step transfer with Stand-by Assistance with DME as needed.  Pt will demonstrate understanding of education provided regarding energy conservation and task modification through teach-back method.         8/1/2020 1626 by Danika Seo OT  Outcome: Ongoing, Progressing    Pt with improved performance this date, required CGA for all mobility,family continuing to request Inpatient Rehab.     Pt progressing towards goals. Cont OT POC  COLTON Morgan  08/01/2020

## 2020-08-02 LAB
POCT GLUCOSE: 114 MG/DL (ref 70–110)
POCT GLUCOSE: 135 MG/DL (ref 70–110)
POCT GLUCOSE: 148 MG/DL (ref 70–110)
POCT GLUCOSE: 185 MG/DL (ref 70–110)
POCT GLUCOSE: 218 MG/DL (ref 70–110)

## 2020-08-02 PROCEDURE — 94761 N-INVAS EAR/PLS OXIMETRY MLT: CPT

## 2020-08-02 PROCEDURE — 25000003 PHARM REV CODE 250: Performed by: FAMILY MEDICINE

## 2020-08-02 PROCEDURE — 97116 GAIT TRAINING THERAPY: CPT | Mod: CQ

## 2020-08-02 PROCEDURE — 63600175 PHARM REV CODE 636 W HCPCS: Performed by: STUDENT IN AN ORGANIZED HEALTH CARE EDUCATION/TRAINING PROGRAM

## 2020-08-02 PROCEDURE — 99233 PR SUBSEQUENT HOSPITAL CARE,LEVL III: ICD-10-PCS | Mod: GC,,, | Performed by: PSYCHIATRY & NEUROLOGY

## 2020-08-02 PROCEDURE — 99233 SBSQ HOSP IP/OBS HIGH 50: CPT | Mod: GC,,, | Performed by: PSYCHIATRY & NEUROLOGY

## 2020-08-02 PROCEDURE — 11000001 HC ACUTE MED/SURG PRIVATE ROOM

## 2020-08-02 PROCEDURE — 25000003 PHARM REV CODE 250: Performed by: STUDENT IN AN ORGANIZED HEALTH CARE EDUCATION/TRAINING PROGRAM

## 2020-08-02 RX ADMIN — QUETIAPINE FUMARATE 25 MG: 25 TABLET ORAL at 09:08

## 2020-08-02 RX ADMIN — HEPARIN SODIUM 5000 UNITS: 5000 INJECTION INTRAVENOUS; SUBCUTANEOUS at 09:08

## 2020-08-02 RX ADMIN — ATORVASTATIN CALCIUM 40 MG: 20 TABLET, FILM COATED ORAL at 08:08

## 2020-08-02 RX ADMIN — DONEPEZIL HYDROCHLORIDE 5 MG: 5 TABLET, FILM COATED ORAL at 09:08

## 2020-08-02 RX ADMIN — ASPIRIN 81 MG: 81 TABLET, COATED ORAL at 08:08

## 2020-08-02 RX ADMIN — HEPARIN SODIUM 5000 UNITS: 5000 INJECTION INTRAVENOUS; SUBCUTANEOUS at 05:08

## 2020-08-02 RX ADMIN — CLOPIDOGREL 75 MG: 75 TABLET, FILM COATED ORAL at 08:08

## 2020-08-02 RX ADMIN — HEPARIN SODIUM 5000 UNITS: 5000 INJECTION INTRAVENOUS; SUBCUTANEOUS at 02:08

## 2020-08-02 NOTE — PLAN OF CARE
Problem: Fall Injury Risk  Goal: Absence of Fall and Fall-Related Injury  Outcome: Ongoing, Progressing  Intervention: Identify and Manage Contributors to Fall Injury Risk  Flowsheets (Taken 8/2/2020 1522)  Self-Care Promotion: independence encouraged  Medication Review/Management: medications reviewed  Intervention: Promote Injury-Free Environment  Flowsheets (Taken 8/2/2020 1522)  Environmental Safety Modification:   assistive device/personal items within reach   lighting adjusted

## 2020-08-02 NOTE — PROGRESS NOTES
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 NIHSS 2. LKN the evening of 7/29.   MRI brain showed an acute infarct in the right parietal lobe. TTE EF 60 %; mild left atrial enlargement.     Etiology of his current stroke is likely small vessel disease vs. CNS vasculitis. Infarct in small periventricular area in R MCA medullary  branch likely d/t traditional small artery occlusion rather than resurgence of vasculitis.     Antithrombotics for secondary stroke prevention: Antiplatelets: Aspirin: 81 mg daily  Clopidogrel: 75 mg daily    Statins for secondary stroke prevention and hyperlipidemia, if present:   Statins: Atorvastatin- 40 mg daily     Aggressive risk factor modification: HTN, DM, HLD, Exercise, Obesity     Rehab efforts: The patient has been evaluated by a stroke team provider and the therapy needs have been fully considered based off the presenting complaints and exam findings. The following therapy evaluations are needed: PT evaluate and treat, OT evaluate and treat, SLP evaluate and treat: rehab (may progress)     Diagnostics ordered/pending: None     VTE prophylaxis: Heparin 5000 units SQ every 8 hours, SCDs    BP parameters: SBP < 180        Cytotoxic cerebral edema  Area of cytotoxic cerebral edema identified when reviewing brain imaging in the territory of the right middle cerebral artery. There is no mass effect associated with it. We will continue to monitor the patients clinical exam for any worsening of symptoms which may indicate expansion of the stroke or the area of the edema resulting in the clinical change. The pattern is suggestive of small vessel disease vs CNS vasculitis etiology.        Dementia with behavioral disturbance  -- Continue Aricept  10mg daily    CNS vasculitis failed imuran; failed cytoxan; 1/2020 rituxan  Was previously diagnosed with CNS vasculitis in 2017. Angiogram showed multifocal areas of narrowing in the anterior and posterior circulation. LP showed an elevated protein (93). Currently follows with Dr Love in [place] [facility]. Has previously received Cytoxan to which he was responding well. However due to concern for long term exposure to this drug, he was transitioned to Rituxan. Received his first dose on 5/14/2020 and he is scheduled to receive a dose every 16 weeks.    Per Dr. Matias: Infarct in small periventricular area in R MCA medullary  branch likely d/t traditional small artery occlusion rather than resurgence of vasculitis - discussed case w/ Dr. Love who is his neuroimmunologist and knows him well. If he decompensates or strokes again in the near future then the possibility of vasculitis related event increases       Encephalopathy  -- Disoriented to time 7/30. Per wife, he has a blunt affect at baseline and is fully oriented.  -- UA negative    Delirium Precautions  -Re-orient patient frequently and keep pt's whiteboard up to date with current day and team member's names  -Keep shades open/room lit during day  -Low light at night (close blinds at night)  -Low stimulation, minimize interruptions at night  -Minimize use of restraints  -Encourage family to be at bedside  -Regular bowel movements  -Avoid opiates, benzodiazepines, antihistamines, anticholinergics, hypnotics as much as possible    Type 2 diabetes mellitus with diabetic polyneuropathy, with long-term current use of insulin  -- Stroke risk factor  -- A1c 7.2  -- LDSSI  -- Diabetic diet when appropriate  -- Hypoglycemia protocol     JOHN with CPAP at night  -- Stroke risk factor  -- CPAP as needed    Essential hypertension 03/09/2020 TTE concentric LV remodeling.  Normal LV systolic function LVEF 55%.  Grade 2 diastolic dysfunction.  Normal CVP.  PA pressure  20.  -- Stroke risk factor  -- SBP< 180 for now  -- BP within goal  -- Resume home meds when appropriate     Mixed hyperlipidemia  -- Stroke risk factor  -- LDL 65  -- Continue Lipitor 40mg           7/31: Oriented x 3 today. Mild dysarthria. ASA 81 mg, Lipitor 40 mg. Echo and PT/OT/SLP eval pending. Staff to staff conversation regarding CNS vasculitis pending.   8/1: Echo complete. Therapy recommending rehab placement. Started Plavix today.   8/2: No acute events overnight. Pending placement    STROKE DOCUMENTATION   Acute Stroke Times   Last Known Normal Date: 07/29/20  Last Known Normal Time: (Sometime yesterday evening)  Symptom Onset Date: 07/30/20  Symptom Onset Time: 0930  Stroke Team Called Date: 07/30/20  Stroke Team Called Time: 1226  Stroke Team Arrival Date: 07/30/20  Stroke Team Arrival Time: 1226  CT Interpretation Time: 1239    NIH Scale:  1a. Level of Consciousness: 0-->Alert, keenly responsive  1b. LOC Questions: 0-->Answers both questions correctly  1c. LOC Commands: 0-->Performs both tasks correctly  2. Best Gaze: 0-->Normal  3. Visual: 0-->No visual loss  4. Facial Palsy: 0-->Normal symmetrical movements  5a. Motor Arm, Left: 0-->No drift, limb holds 90 (or 45) degrees for full 10 secs  5b. Motor Arm, Right: 0-->No drift, limb holds 90 (or 45) degrees for full 10 secs  6a. Motor Leg, Left: 0-->No drift, leg holds 30 degree position for full 5 secs  6b. Motor Leg, Right: 0-->No drift, leg holds 30 degree position for full 5 secs  7. Limb Ataxia: 0-->Absent  8. Sensory: 0-->Normal, no sensory loss  9. Best Language: 0-->No aphasia, normal  10. Dysarthria: 1-->Mild-to-moderate dysarthria, patient slurs at least some words and, at worst, can be understood with some difficulty  11. Extinction and Inattention (formerly Neglect): 0-->No abnormality  Total (NIH Stroke Scale): 1       Modified Gunjan Score: 0  [place] Coma Scale:    ABCD2 Score:    [id number] Score:   HAS -BLED Score:   ICH Score:    Dailey & Wilson Classification:      Hemorrhagic change of an Ischemic Stroke: Does this patient have an ischemic stroke with hemorrhagic changes? No     Neurologic Chief Complaint: worsening confusion, slurred speech and right side weakness    Subjective:     Interval History: Patient is seen for follow-up neurological assessment and treatment recommendations:      No acute events overnight. Pending placement    HPI, Past Medical, Family, and Social History remains the same as documented in the initial encounter.     Review of Systems   Constitutional: Negative for chills and fever.   HENT: Negative for trouble swallowing.    Eyes: Negative for visual disturbance.   Respiratory: Negative for shortness of breath.    Cardiovascular: Negative for chest pain.   Gastrointestinal: Negative for diarrhea, nausea and vomiting.   Neurological: Positive for speech difficulty. Negative for facial asymmetry, weakness, numbness and headaches.   Psychiatric/Behavioral: Negative for confusion and decreased concentration.     Scheduled Meds:   aspirin  81 mg Oral Daily    atorvastatin  40 mg Oral Daily    clopidogreL  75 mg Oral Daily    donepeziL  5 mg Oral QHS    heparin (porcine)  5,000 Units Subcutaneous Q8H    QUEtiapine  25 mg Oral QHS     Continuous Infusions:   sodium chloride 0.9%       PRN Meds:dextrose 50%, glucagon (human recombinant), insulin aspart U-100, labetaloL, ondansetron, sodium chloride 0.9%, sodium chloride 0.9%    Objective:     Vital Signs (Most Recent):  Temp: 98.1 °F (36.7 °C) (08/02/20 1141)  Pulse: 69 (08/02/20 1141)  Resp: 20 (08/02/20 1141)  BP: (!) 167/86 (08/02/20 1141)  SpO2: 99 % (08/02/20 1141)  BP Location: Left arm    Vital Signs Range (Last 24H):  Temp:  [97.1 °F (36.2 °C)-98.9 °F (37.2 °C)]   Pulse:  [59-71]   Resp:  [16-20]   BP: (129-167)/(60-86)   SpO2:  [93 %-99 %]   BP Location: Left arm    Physical Exam  Vitals signs reviewed.   Constitutional:       Appearance: Normal appearance.    HENT:      Head: Normocephalic.   Eyes:      Pupils: Pupils are equal, round, and reactive to light.   Cardiovascular:      Rate and Rhythm: Normal rate.   Pulmonary:      Effort: Pulmonary effort is normal.   Skin:     General: Skin is warm and dry.   Neurological:      Mental Status: He is alert and oriented to person, place, and time.   Psychiatric:         Speech: Speech is slurred.         Neurological Exam:   LOC: alert  Attention Span: Good   Language: No aphasia  Articulation: Dysarthria - improving   Orientation: Person, Place, Time   Visual Fields: Full  EOM (CN III, IV, VI): Full/intact  Facial Movement (CN VII): Symmetric facial expression    Motor: Arm left  Normal 5/5  Leg left  Normal 5/5  Arm right  Normal 5/5  Leg right Normal 5/5  Cebellar: No evidence of appendicular or axial ataxia  Sensation: Intact to light touch, temperature and vibration  Tone: Normal tone throughout    Laboratory:  CMP:   No results for input(s): GLUCOSE, CALCIUM, ALBUMIN, PROT, NA, K, CO2, CL, BUN, CREATININE, ALKPHOS, ALT, AST, BILITOT in the last 24 hours.  CBC:   Recent Labs   Lab 08/01/20  0903   WBC 5.42   RBC 3.74*   HGB 10.9*   HCT 34.8*   *   MCV 93   MCH 29.1   MCHC 31.3*     Lipid Panel:   Recent Labs   Lab 07/30/20  1305   CHOL 175   LDLCALC 65.6   HDL 55   TRIG 272*     Hgb A1C:   Recent Labs   Lab 07/30/20  1900   HGBA1C 7.2*     TSH:   Recent Labs   Lab 07/30/20  1305   TSH 3.144       Diagnostic Results     Brain/Vessel imaging:     CT Head (7/30/2020) -              Interval development of focal hypodensity right thalamus concerning for lacunar type infarction overall age indeterminate may be recent.  There is intermediate density along the posterior aspect cannot exclude petechial hemorrhage or mineralization.     No evidence for acute extra-axial hemorrhage or hydrocephalus.     Scattered patchy and confluent regions of decreased attenuation supratentorial white matter suggestive for chronic  ischemic change with remote left corona radiata infarction.  In addition there are remote bilateral cerebellar infarcts.  MRI Brain (7/24/2020) -             Acute infarct in the right corona radiata.  Chronic ischemic changes.     Cardiac Evaluation:      TTE  7/31/2020  · Technically challenging study  · Normal left ventricular systolic function. The estimated ejection fraction is 60%.  · Concentric left ventricular remodeling.  · Indeterminate left ventricular diastolic function.  · No wall motion abnormalities.  · Normal right ventricular systolic function.  · Mild left atrial enlargement.  · Normal central venous pressure (3 mmHg).     (3/9/2020) -      · Concentric left ventricular remodeling. Normal left ventricular systolic function. The estimated ejection fraction is 55%.  · Grade II (moderate) left ventricular diastolic dysfunction consistent with pseudonormalization.  · Severe left atrial enlargement.  · Normal right ventricular systolic function.  · Mild right atrial enlargement.  · Normal central venous pressure (3 mmHg).  · The estimated PA systolic pressure is 20 mmHg.           Alfa Stephenson NP  Comprehensive Stroke Center  Department of Vascular Neurology   [facility]

## 2020-08-02 NOTE — PLAN OF CARE
Problem: Fall Injury Risk  Goal: Absence of Fall and Fall-Related Injury  Outcome: Ongoing, Progressing     Problem: Hemodynamic Instability (Stroke, Ischemic/Transient Ischemic Attack)  Goal: Vital Signs Remain in Desired Range  Outcome: Ongoing, Progressing     Problem: Adult Inpatient Plan of Care  Goal: Plan of Care Review  Outcome: Ongoing, Progressing     Patient is AAO x4. POC reviewed with patient. Patient verbalized understanding. Patient's breathing is unlabored with equal chest expansion. Patient denies any numbness and tingling. Patient voids per condom catheter. Patient remained free from falls. Patient rested well through shift. Bed in lowest position,bed alarm on, side rails up x3, no complaints or signs of distress. WCTM.  See flowsheets for full assessment and VS info.

## 2020-08-02 NOTE — PLAN OF CARE
Problem: Physical Therapy Goal  Goal: Physical Therapy Goal  Description: Goals to be met by:      Patient will increase functional independence with mobility by performin. Supine to sit with Set-up Wood  2. Sit to supine with Set-up Wood  3. Sit to stand transfer with Supervision  4. Gait  x 200 feet with Supervision using Rolling Walker.   5. Lower extremity exercise program x15 reps per handout, with independence to improve strength and activity tolerance.     Outcome: Ongoing, Progressing       Discharge Recommendations: Rehab     Pt safe to transfer OOB/BTB and amb short distances with RN/PCT: Use RW with min A.    Goals remain appropriate.     Inés Alfonso, PTA.   [phone]   2020

## 2020-08-02 NOTE — SUBJECTIVE & OBJECTIVE
Neurologic Chief Complaint: worsening confusion, slurred speech and right side weakness    Subjective:     Interval History: Patient is seen for follow-up neurological assessment and treatment recommendations:      No acute events overnight. Pending placement    HPI, Past Medical, Family, and Social History remains the same as documented in the initial encounter.     Review of Systems   Constitutional: Negative for chills and fever.   HENT: Negative for trouble swallowing.    Eyes: Negative for visual disturbance.   Respiratory: Negative for shortness of breath.    Cardiovascular: Negative for chest pain.   Gastrointestinal: Negative for diarrhea, nausea and vomiting.   Neurological: Positive for speech difficulty. Negative for facial asymmetry, weakness, numbness and headaches.   Psychiatric/Behavioral: Negative for confusion and decreased concentration.     Scheduled Meds:   aspirin  81 mg Oral Daily    atorvastatin  40 mg Oral Daily    clopidogreL  75 mg Oral Daily    donepeziL  5 mg Oral QHS    heparin (porcine)  5,000 Units Subcutaneous Q8H    QUEtiapine  25 mg Oral QHS     Continuous Infusions:   sodium chloride 0.9%       PRN Meds:dextrose 50%, glucagon (human recombinant), insulin aspart U-100, labetaloL, ondansetron, sodium chloride 0.9%, sodium chloride 0.9%    Objective:     Vital Signs (Most Recent):  Temp: 98.1 °F (36.7 °C) (08/02/20 1141)  Pulse: 69 (08/02/20 1141)  Resp: 20 (08/02/20 1141)  BP: (!) 167/86 (08/02/20 1141)  SpO2: 99 % (08/02/20 1141)  BP Location: Left arm    Vital Signs Range (Last 24H):  Temp:  [97.1 °F (36.2 °C)-98.9 °F (37.2 °C)]   Pulse:  [59-71]   Resp:  [16-20]   BP: (129-167)/(60-86)   SpO2:  [93 %-99 %]   BP Location: Left arm    Physical Exam  Vitals signs reviewed.   Constitutional:       Appearance: Normal appearance.   HENT:      Head: Normocephalic.   Eyes:      Pupils: Pupils are equal, round, and reactive to light.   Cardiovascular:      Rate and Rhythm: Normal  rate.   Pulmonary:      Effort: Pulmonary effort is normal.   Skin:     General: Skin is warm and dry.   Neurological:      Mental Status: He is alert and oriented to person, place, and time.   Psychiatric:         Speech: Speech is slurred.         Neurological Exam:   LOC: alert  Attention Span: Good   Language: No aphasia  Articulation: Dysarthria - improving   Orientation: Person, Place, Time   Visual Fields: Full  EOM (CN III, IV, VI): Full/intact  Facial Movement (CN VII): Symmetric facial expression    Motor: Arm left  Normal 5/5  Leg left  Normal 5/5  Arm right  Normal 5/5  Leg right Normal 5/5  Cebellar: No evidence of appendicular or axial ataxia  Sensation: Intact to light touch, temperature and vibration  Tone: Normal tone throughout    Laboratory:  CMP:   No results for input(s): GLUCOSE, CALCIUM, ALBUMIN, PROT, NA, K, CO2, CL, BUN, CREATININE, ALKPHOS, ALT, AST, BILITOT in the last 24 hours.  CBC:   Recent Labs   Lab 08/01/20  0903   WBC 5.42   RBC 3.74*   HGB 10.9*   HCT 34.8*   *   MCV 93   MCH 29.1   MCHC 31.3*     Lipid Panel:   Recent Labs   Lab 07/30/20  1305   CHOL 175   LDLCALC 65.6   HDL 55   TRIG 272*     Hgb A1C:   Recent Labs   Lab 07/30/20  1900   HGBA1C 7.2*     TSH:   Recent Labs   Lab 07/30/20  1305   TSH 3.144       Diagnostic Results     Brain/Vessel imaging:     CT Head (7/30/2020) -              Interval development of focal hypodensity right thalamus concerning for lacunar type infarction overall age indeterminate may be recent.  There is intermediate density along the posterior aspect cannot exclude petechial hemorrhage or mineralization.     No evidence for acute extra-axial hemorrhage or hydrocephalus.     Scattered patchy and confluent regions of decreased attenuation supratentorial white matter suggestive for chronic ischemic change with remote left corona radiata infarction.  In addition there are remote bilateral cerebellar infarcts.  MRI Brain (7/24/2020) -              Acute infarct in the right corona radiata.  Chronic ischemic changes.     Cardiac Evaluation:      TTE  7/31/2020  · Technically challenging study  · Normal left ventricular systolic function. The estimated ejection fraction is 60%.  · Concentric left ventricular remodeling.  · Indeterminate left ventricular diastolic function.  · No wall motion abnormalities.  · Normal right ventricular systolic function.  · Mild left atrial enlargement.  · Normal central venous pressure (3 mmHg).     (3/9/2020) -      · Concentric left ventricular remodeling. Normal left ventricular systolic function. The estimated ejection fraction is 55%.  · Grade II (moderate) left ventricular diastolic dysfunction consistent with pseudonormalization.  · Severe left atrial enlargement.  · Normal right ventricular systolic function.  · Mild right atrial enlargement.  · Normal central venous pressure (3 mmHg).  · The estimated PA systolic pressure is 20 mmHg.

## 2020-08-02 NOTE — ASSESSMENT & PLAN NOTE
-- Stroke risk factor  -- SBP< 180 for now  -- BP within goal  -- Resume home meds when appropriate

## 2020-08-02 NOTE — PT/OT/SLP PROGRESS
Physical Therapy Treatment    Patient Name:  Bessy Nunez   MRN:  [id number]  Admitting Diagnosis: Acute right MCA stroke  Recent Surgery: * No surgery found *      Recommendations:     Discharge Recommendations:  rehabilitation facility(Pt may progress)   Discharge Equipment Recommendations: walker, rolling   Barriers to discharge: . FALL RISK    Plan:     During this hospitalization, patient to be seen 4 x/week to address the above listed problems via gait training, therapeutic activities, therapeutic exercises, neuromuscular re-education  · Plan of Care Expires:  08/30/20   Plan of Care Reviewed with: patient    This Plan of care has been discussed with the patient who was involved in its development and understands and is in agreement with the identified goals and treatment plan    Subjective     Communicated with nurse (Shana WARREN) prior to session.     Patient comments: Pt with no complaints  Pain/Comfort:  · Pain Rating 1: 0/10  · Pain Rating Post-Intervention 1: 0/10    Objective:     Patient found with: bed alarm, telemetry    Patient found R side lying in bed with nurse assisting him with pericare upon PT entry to room, agreeable to treatment.  No family present in the room.    General Precautions: Standard, aphasia, aspiration, fall   Orthopedic Precautions:N/A   Braces: N/A       BED MOBILITY (vc's for hand placement sequencing of task):        Rolling to the R:  SBA.       Rolling to the L:  SBA.        Sup > sit at the EOB:  SBA for safety, exiting on the L side.       Sit > sup:  Not performed 2* pt left seated UIC                SITTING AT THE EDGE OF THE BED    Assistance Level Required: close sup for safety with B UE support   Postural deviations noted: flexed trunk   Encouraged: upright posture        TRANSFERS  (vc's for hand placement, sequencing of task and safety)   Patient completed Sit <> Stand Transfer from EOB/BS chair with CGA for safety with RW/no AD x3 trial(s)   Patient completed Stand  <> Sit Transfer to BS chair with CGA for safety with RW/no AD        GAIT: in hallway   Patient ambulated: 160ft with RW; 70ft with no AD   Patient required: CGA/SBA with RW; CGA/min A for balance, especially when turning   Patient used:  RW/no AD   Gait Pattern observed: reciprocal gait   Gait Deviation(s): increased justin, decreased step length, decreased stride length, decreased toe-to-floor clearance, decreased weight-shifting ability,festinating gait and instability    Comments: vc's and tc's for decreased justin, directional guidance, B step length, safety (especially when turning)    EDUCATION  Patient provided with daily orientation and goals of this PT session. They were educated to call for assistance and to transfer with hospital staff only.  Also, pt was educated on the effects of prolonged immobility and the importance of performing OOB activity and exercises to promote healing and reduce recovery time    Whiteboard updated with correct mobility information. RN/PCT notified.  Pt safe to transfer OOB/BTB and amb short distances with RN/PCT: Use RW with min A.    Patient left up in chair, with  all lines intact, call button in reach and nurse notified    AM-PAC 6 CLICK MOBILITY  Turning over in bed (including adjusting bedclothes, sheets and blankets)?: 3  Sitting down on and standing up from a chair with arms (e.g., wheelchair, bedside commode, etc.): 3  Moving from lying on back to sitting on the side of the bed?: 3  Moving to and from a bed to a chair (including a wheelchair)?: 3  Need to walk in hospital room?: 3  Climbing 3-5 steps with a railing?: 2  Basic Mobility Total Score: 17     Assessment:     Bessy Nunez is a 60 y.o. male admitted with a medical diagnosis of Acute right MCA stroke.  He presents with the following impairments/functional limitations:  weakness, impaired endurance, impaired self care skills, impaired functional mobilty, gait instability, impaired balance, impaired  cognition, decreased coordination, decreased upper extremity function, decreased lower extremity function, decreased safety awareness, impaired coordination. requiring assistance and verbal cues for bed mob, transfers and gait to prevent falls due to instability.   In light of pt's current functional level and deficits, it is anticipated that pt will need to participate in an intense rehab program consisting of PT and OT in order to achieve full rehab potential to return to previous level of function and roles.  Pt remains motivated to participate in PT session and will cont to benefit from skilled PT intervention.      Rehab Prognosis:  Good; patient would benefit from acute skilled PT services to address these deficits and reach maximum level of function.      GOALS:   Multidisciplinary Problems     Physical Therapy Goals        Problem: Physical Therapy Goal    Goal Priority Disciplines Outcome Goal Variances Interventions   Physical Therapy Goal     PT, PT/OT Ongoing, Progressing     Description: Goals to be met by:      Patient will increase functional independence with mobility by performin. Supine to sit with Set-up [place]  2. Sit to supine with Set-up [place]  3. Sit to stand transfer with Supervision  4. Gait  x 200 feet with Supervision using Rolling Walker.   5. Lower extremity exercise program x15 reps per handout, with independence to improve strength and activity tolerance.                      Time Tracking:     PT Received On: 20  PT Start Time: 1442     PT Stop Time: 1501  PT Total Time (min): 19 min     Billable Minutes: Gait Training 19    Treatment Type: Treatment  PT/PTA: PTA     PTA Visit Number: Nadine Alfonso PTA.  Pager [phone]    2020    .

## 2020-08-03 VITALS
HEIGHT: 70 IN | RESPIRATION RATE: 14 BRPM | BODY MASS INDEX: 31.35 KG/M2 | DIASTOLIC BLOOD PRESSURE: 107 MMHG | OXYGEN SATURATION: 97 % | HEART RATE: 69 BPM | WEIGHT: 219 LBS | TEMPERATURE: 99 F | SYSTOLIC BLOOD PRESSURE: 184 MMHG

## 2020-08-03 LAB
POCT GLUCOSE: 172 MG/DL (ref 70–110)
POCT GLUCOSE: 202 MG/DL (ref 70–110)

## 2020-08-03 PROCEDURE — 99232 SBSQ HOSP IP/OBS MODERATE 35: CPT | Mod: ,,, | Performed by: NURSE PRACTITIONER

## 2020-08-03 PROCEDURE — 99232 PR SUBSEQUENT HOSPITAL CARE,LEVL II: ICD-10-PCS | Mod: ,,, | Performed by: NURSE PRACTITIONER

## 2020-08-03 PROCEDURE — 25000003 PHARM REV CODE 250: Performed by: FAMILY MEDICINE

## 2020-08-03 PROCEDURE — 97530 THERAPEUTIC ACTIVITIES: CPT

## 2020-08-03 PROCEDURE — 99238 HOSP IP/OBS DSCHRG MGMT 30/<: CPT | Mod: GC,,, | Performed by: PSYCHIATRY & NEUROLOGY

## 2020-08-03 PROCEDURE — 99238 PR HOSPITAL DISCHARGE DAY,<30 MIN: ICD-10-PCS | Mod: GC,,, | Performed by: PSYCHIATRY & NEUROLOGY

## 2020-08-03 PROCEDURE — 25000003 PHARM REV CODE 250: Performed by: STUDENT IN AN ORGANIZED HEALTH CARE EDUCATION/TRAINING PROGRAM

## 2020-08-03 PROCEDURE — 97116 GAIT TRAINING THERAPY: CPT

## 2020-08-03 PROCEDURE — 94761 N-INVAS EAR/PLS OXIMETRY MLT: CPT

## 2020-08-03 PROCEDURE — 63600175 PHARM REV CODE 636 W HCPCS: Performed by: STUDENT IN AN ORGANIZED HEALTH CARE EDUCATION/TRAINING PROGRAM

## 2020-08-03 RX ORDER — CLOPIDOGREL BISULFATE 75 MG/1
75 TABLET ORAL DAILY
Qty: 21 TABLET | Refills: 0 | Status: SHIPPED | OUTPATIENT
Start: 2020-08-04 | End: 2020-10-19 | Stop reason: ALTCHOICE

## 2020-08-03 RX ADMIN — HEPARIN SODIUM 5000 UNITS: 5000 INJECTION INTRAVENOUS; SUBCUTANEOUS at 05:08

## 2020-08-03 RX ADMIN — INSULIN ASPART 2 UNITS: 100 INJECTION, SOLUTION INTRAVENOUS; SUBCUTANEOUS at 05:08

## 2020-08-03 RX ADMIN — INSULIN ASPART 1 UNITS: 100 INJECTION, SOLUTION INTRAVENOUS; SUBCUTANEOUS at 12:08

## 2020-08-03 RX ADMIN — ASPIRIN 81 MG: 81 TABLET, COATED ORAL at 09:08

## 2020-08-03 RX ADMIN — ATORVASTATIN CALCIUM 40 MG: 20 TABLET, FILM COATED ORAL at 09:08

## 2020-08-03 RX ADMIN — CLOPIDOGREL 75 MG: 75 TABLET, FILM COATED ORAL at 09:08

## 2020-08-03 NOTE — PLAN OF CARE
08/03/20 1805   Final Note   Assessment Type Final Discharge Note   Anticipated Discharge Disposition Home-Health   What phone number can be called within the next 1-3 days to see how you are doing after discharge? [phone]   Hospital Follow Up  Appt(s) scheduled? Yes  (Clinic to schedule)   SW contacted PHN to follow up on home health agency. [facility] is pending acceptance.     Yoly Parish, HANDY  [facility]

## 2020-08-03 NOTE — PROGRESS NOTES
[facility]  Physical Medicine & Rehab  Progress Note    Patient Name: Bessy Nunez  MRN: [id number]  Admission Date: 7/30/2020  Length of Stay: 4 days  Attending Physician: Isael Matias MD    Subjective:     Principal Problem:Acute right MCA stroke    Hospital Course:   07/31/2020: Bed mobility CGA.  Sit to stand CGA & RW.  Ambulated 75 ft CGA-[place] & RW.  Tyler [place] LBD CGA. Passed bedside swallow evaluation.  SLP recommending regular diet and thin liquids.  8/1: BM, sit to stand, trxs, groom, toilet CGA.     Interval History 8/3/2020:  Patient is seen for follow-up PM&R evaluation and recommendations: Participating w/ therapy.    HPI, Past Medical, Family, and Social History remains the same as documented in the initial encounter.    Scheduled Medications:    aspirin  81 mg Oral Daily    atorvastatin  40 mg Oral Daily    clopidogreL  75 mg Oral Daily    donepeziL  5 mg Oral QHS    heparin (porcine)  5,000 Units Subcutaneous Q8H    QUEtiapine  25 mg Oral QHS       Diagnostic Results: Labs: Reviewed    PRN Medications: dextrose 50%, glucagon (human recombinant), insulin aspart U-100, labetaloL, ondansetron, sodium chloride 0.9%, sodium chloride 0.9%    Review of Systems   Constitutional: Positive for activity change. Negative for fatigue and fever.   HENT: Negative for trouble swallowing and voice change.    Eyes: Negative for photophobia and visual disturbance.   Respiratory: Negative for cough and shortness of breath.    Cardiovascular: Negative for chest pain and palpitations.   Gastrointestinal: Negative for nausea and vomiting.   Genitourinary: Negative for difficulty urinating and flank pain.   Musculoskeletal: Positive for gait problem. Negative for arthralgias.   Skin: Negative for color change and rash.   Neurological: Positive for weakness. Negative for facial asymmetry, speech difficulty and numbness.   Psychiatric/Behavioral: Positive for confusion. Negative for agitation.      Objective:     Vital Signs (Most Recent):  Temp: 98.5 °F (36.9 °C) (08/03/20 0736)  Pulse: 79 (08/03/20 0736)  Resp: 18 (08/03/20 0736)  BP: 122/65 (08/03/20 0736)  SpO2: (!) 91 % (08/03/20 0736)    Vital Signs (24h Range):  Temp:  [97.5 °F (36.4 °C)-98.8 °F (37.1 °C)] 98.5 °F (36.9 °C)  Pulse:  [69-86] 79  Resp:  [18-20] 18  SpO2:  [91 %-99 %] 91 %  BP: (122-198)/(65-94) 122/65     Physical Exam  Constitutional:       General: He is not in acute distress.     Appearance: He is well-developed.      Comments: Wife at bedside   HENT:      Head: Normocephalic and atraumatic.   Eyes:      General:         Right eye: No discharge.         Left eye: No discharge.   Neck:      Musculoskeletal: Neck supple.   Pulmonary:      Effort: Pulmonary effort is normal. No respiratory distress.   Abdominal:      General: There is no distension.      Palpations: Abdomen is soft.   Musculoskeletal:         General: No deformity.      Comments: Lifts BLE against gravity, RAMIREZ BUEs   Skin:     General: Skin is warm and dry.   Neurological:      Mental Status: He is alert. He is disoriented.      Comments: Follows commands  oriented to self, place but not year    Psychiatric:         Behavior: Behavior normal.         Cognition and Memory: Cognition is impaired.   Assessment/Plan:      * Acute right MCA stroke  -MRI brain showed an acute infarct in the right parietal lobe per stroke team. Per stroke team, etiology of his current stroke is likely secondary to CNS vasculitis > small vessel disease.     See hospital course for functional, cognitive/speech/language, and nutrition/swallow status.    Recommendations  -  Encourage mobility, OOB in chair, and early ambulation as appropriate   -  PT, OT, SLP evaluate and treat  -  Monitor mood and sleep disturbances and establish consistent sleep-wake cycle  -  Monitor for bowel and bladder dysfunction  -  Monitor for shoulder pain/subluxation and spasticity  -  DVT prophylaxis if  appropriate  -  Monitor for and prevent skin breakdown and pressure ulcers  · Early mobility, repositioning/weight shifting every 20-30 minutes when sitting, turn patient every 2 hours, proper mattress/overlay and chair cushioning, pressure relief/heel protector boots    Dementia with behavioral disturbance  -on aricept     CNS vasculitis failed imuran; failed cytoxan; 1/2020 rituxan  -dx 2017  -Dr Love in [facility]  -s/p first Rituxin 5/14/2020 and he is scheduled to receive a dose every 16 weeks    PAC recommendation: Inpatient Rehab        Evon Tavera NP  Department of Physical Medicine & Rehab   [facility]

## 2020-08-03 NOTE — PLAN OF CARE
Problem: Fall Injury Risk  Goal: Absence of Fall and Fall-Related Injury  Outcome: Ongoing, Progressing  Intervention: Promote Injury-Free Environment  Flowsheets (Taken 8/3/2020 1510)  Safety Promotion/Fall Prevention:   assistive device/personal item within reach   bed alarm set   family to remain at bedside   medications reviewed   lighting adjusted  Environmental Safety Modification:   assistive device/personal items within reach   clutter free environment maintained   lighting adjusted

## 2020-08-03 NOTE — SUBJECTIVE & OBJECTIVE
Interval History 8/3/2020:  Patient is seen for follow-up PM&R evaluation and recommendations: Participating w/ therapy.    HPI, Past Medical, Family, and Social History remains the same as documented in the initial encounter.    Scheduled Medications:    aspirin  81 mg Oral Daily    atorvastatin  40 mg Oral Daily    clopidogreL  75 mg Oral Daily    donepeziL  5 mg Oral QHS    heparin (porcine)  5,000 Units Subcutaneous Q8H    QUEtiapine  25 mg Oral QHS       Diagnostic Results: Labs: Reviewed    PRN Medications: dextrose 50%, glucagon (human recombinant), insulin aspart U-100, labetaloL, ondansetron, sodium chloride 0.9%, sodium chloride 0.9%    Review of Systems   Constitutional: Positive for activity change. Negative for fatigue and fever.   HENT: Negative for trouble swallowing and voice change.    Eyes: Negative for photophobia and visual disturbance.   Respiratory: Negative for cough and shortness of breath.    Cardiovascular: Negative for chest pain and palpitations.   Gastrointestinal: Negative for nausea and vomiting.   Genitourinary: Negative for difficulty urinating and flank pain.   Musculoskeletal: Positive for gait problem. Negative for arthralgias.   Skin: Negative for color change and rash.   Neurological: Positive for weakness. Negative for facial asymmetry, speech difficulty and numbness.   Psychiatric/Behavioral: Positive for confusion. Negative for agitation.     Objective:     Vital Signs (Most Recent):  Temp: 98.5 °F (36.9 °C) (08/03/20 0736)  Pulse: 79 (08/03/20 0736)  Resp: 18 (08/03/20 0736)  BP: 122/65 (08/03/20 0736)  SpO2: (!) 91 % (08/03/20 0736)    Vital Signs (24h Range):  Temp:  [97.5 °F (36.4 °C)-98.8 °F (37.1 °C)] 98.5 °F (36.9 °C)  Pulse:  [69-86] 79  Resp:  [18-20] 18  SpO2:  [91 %-99 %] 91 %  BP: (122-198)/(65-94) 122/65     Physical Exam  Constitutional:       General: He is not in acute distress.     Appearance: He is well-developed.      Comments: Wife at bedside   HENT:       Head: Normocephalic and atraumatic.   Eyes:      General:         Right eye: No discharge.         Left eye: No discharge.   Neck:      Musculoskeletal: Neck supple.   Pulmonary:      Effort: Pulmonary effort is normal. No respiratory distress.   Abdominal:      General: There is no distension.      Palpations: Abdomen is soft.   Musculoskeletal:         General: No deformity.      Comments: Lifts BLE against gravity, RAMIREZ BUEs   Skin:     General: Skin is warm and dry.   Neurological:      Mental Status: He is alert. He is disoriented.      Comments: Follows commands  oriented to self, place but not year    Psychiatric:         Behavior: Behavior normal.         Cognition and Memory: Cognition is impaired.

## 2020-08-03 NOTE — PLAN OF CARE
08/03/20 1412   Post-Acute Status   Post-Acute Authorization Home Health   Home Health Status Referrals Sent       Referral sent to [facility]. According to their protocol. The referral  for HH is sent top them and they will chose which HH for the patient.  SW in contact with CM and Medical staff. Will continue to follow and offer support as needed.     Ag Craven LMSW  [facility]   Ext. [phone]

## 2020-08-03 NOTE — PLAN OF CARE
[facility]    HOME HEALTH ORDERS  FACE TO FACE ENCOUNTER    Patient Name: Bessy Nunez  YOB: 1960    PCP: Edgar Nova MD   PCP Address: [address] / [place] [address]  PCP Phone Number: [phone]  PCP Fax: [phone]    Encounter Date: 08/03/2020    Admit to Home Health    Diagnoses:  Active Hospital Problems    Diagnosis  POA    *Acute right MCA stroke [I63.511]  Yes    Cytotoxic cerebral edema [G93.6]  Yes    Dementia with behavioral disturbance [F03.91]  Yes     Chronic    CNS vasculitis failed imuran; failed cytoxan; 1/2020 rituxan [I77.6]  Yes     Failed Cytoxan hiatus and transition to Imuran Sept/Oct 2018; restarted on cytoxan  12/4/19 MRI brain with/without: Chronic ischemic change as further detailed above, similar to MRI of 04/15/2019.  Multiple small foci of prior hemorrhage in the cerebellum and jose, possible sequela from hypertension.  No evidence of recent infarction or other acute intracranial pathology.      Encephalopathy [G93.40]  Yes    Type 2 diabetes mellitus with diabetic polyneuropathy, with long-term current use of insulin [E11.42, Z79.4]  Not Applicable     Chronic     JOHN with CPAP at night [G47.33]  Yes    Mixed hyperlipidemia [E78.2]  Yes     Chronic    Essential hypertension 03/09/2020 TTE concentric LV remodeling.  Normal LV systolic function LVEF 55%.  Grade 2 diastolic dysfunction.  Normal CVP.  PA pressure 20. [I10]  Yes     Chronic     5/11/2016 TTE:   1 - Mildly depressed left ventricular systolic function (EF 45-50%).  Mild global hypokinesis at rest. 2 - Eccentric hypertrophy. 3 - Left ventricular diastolic dysfunction.  3/2020 hospitalization for dehydration with mild elevated troponin.  Echo normal.  03/09/2020 TTE concentric LV remodeling.  Normal LV systolic function LVEF 55%.  Grade 2 diastolic dysfunction.  Normal CVP.  PA pressure 20.        Resolved Hospital Problems   No resolved problems to display.        Future Appointments   Date Time Provider Department Center   10/28/2020 11:40 AM Shana Love MD [facility]     Follow-up Information     Edgar Nova MD.    Specialty: Internal Medicine  Why: Outpatient Services  Contact information:  [address] [phone]                     I have seen and examined this patient face to face today. My clinical findings that support the need for the home health skilled services and home bound status are the following:  Weakness/numbness causing balance and gait disturbance due to Stroke making it taxing to leave home.  Requiring assistive device to leave home due to unsteady gait caused by  Stroke.    Allergies:Review of patient's allergies indicates:  No Known Allergies    Diet: diabetic diet: 2000 calorie, thin consistency    Activities: activity as tolerated    Nursing:   SN to complete comprehensive assessment including routine vital signs. Instruct on disease process and s/s of complications to report to MD. Review/verify medication list sent home with the patient at time of discharge  and instruct patient/caregiver as needed. Frequency may be adjusted depending on start of care date.    SBPgoal <140 72 hours following stroke      CONSULTS:    Physical Therapy to evaluate and treat. Evaluate for home safety and equipment needs; Establish/upgrade home exercise program. Perform / instruct on therapeutic exercises, gait training, transfer training, and Range of Motion.  Occupational Therapy to evaluate and treat. Evaluate home environment for safety and equipment needs. Perform/Instruct on transfers, ADL training, ROM, and therapeutic exercises.  Speech Therapy  to evaluate and treat for  Swallowing.    MISCELLANEOUS CARE:  Diabetic Care:   SN to perform and educate Diabetic management with blood glucose monitoring: and Fingerstick blood sugar a.m. and p.m.        Medications: Review discharge medications with patient and family  and provide education.      Current Discharge Medication List      START taking these medications    Details   clopidogreL (PLAVIX) 75 mg tablet Take 1 tablet (75 mg total) by mouth once daily.  Qty: 21 tablet, Refills: 0         CONTINUE these medications which have NOT CHANGED    Details   atenolol (TENORMIN) 50 MG tablet Take 1 tablet (50 mg total) by mouth once daily.  Qty: 90 tablet, Refills: 3    Associated Diagnoses: Essential hypertension      atorvastatin (LIPITOR) 40 MG tablet TAKE 1 TABLET BY MOUTH ONCE DAILY  Qty: 90 tablet, Refills: 3    Associated Diagnoses: Mixed hyperlipidemia      diltiaZEM (DILT-XR) 240 MG CDCR Take 1 capsule (240 mg total) by mouth once daily.  Qty: 90 capsule, Refills: 3    Associated Diagnoses: Essential hypertension      donepezil (ARICEPT) 5 MG tablet Take 1 tablet (5 mg total) by mouth every evening.  Qty: 90 tablet, Refills: 3    Associated Diagnoses: Cognitive impairment      sertraline (ZOLOFT) 100 MG tablet 1.5 tablet daily  Qty: 135 tablet, Refills: 3    Comments: Please consider 90 day supplies to promote better adherence  Associated Diagnoses: Major depressive disorder with single episode, in full remission      alendronate (FOSAMAX) 70 MG tablet Take 1 tablet (70 mg total) by mouth every 7 days.  Qty: 4 tablet, Refills: 11    Associated Diagnoses: Other osteoporosis with current pathological fracture with routine healing, subsequent encounter      aspirin (ECOTRIN) 81 MG EC tablet Take 81 mg by mouth once daily.      blood sugar diagnostic (TRUE METRIX GLUCOSE TEST STRIP) Strp Test blood sugar four times a day  Qty: 400 each, Refills: 11    Associated Diagnoses: Controlled type 2 diabetes mellitus with diabetic nephropathy, with long-term current use of insulin      FREESTYLE ARELY 14 DAY READER Misc GLUCOSE TESTING : FASTING AND PRIOR TO MEALS  Qty: 1 each, Refills: 0    Comments: Please consider 90 day supplies to promote better adherence  Associated Diagnoses:  Uncontrolled type 2 diabetes mellitus with hyperglycemia      FREESTYLE ARELY 14 DAY SENSOR Kit GLUCOSE TESTING 4 TIMES A DAY  Qty: 2 kit, Refills: 11    Associated Diagnoses: Uncontrolled type 2 diabetes mellitus with hyperglycemia      insulin (BASAGLAR KWIKPEN U-100 INSULIN) glargine 100 units/mL (3mL) SubQ pen Inject 25 Units into the skin once daily. Up to 60 BID with cytoxan  Qty: 2 Box, Refills: 11    Associated Diagnoses: Type 2 diabetes mellitus with hyperglycemia, with long-term current use of insulin; Controlled type 2 diabetes mellitus with diabetic nephropathy, with long-term current use of insulin      insulin aspart U-100 (NOVOLOG) 100 unit/mL (3 mL) InPn pen Inject 1-10 Units into the skin before meals and at bedtime as needed (Hyperglycemia).  Qty: 3 mL, Refills: 0      irbesartan (AVAPRO) 300 MG tablet TAKE 1 TABLET BY MOUTH ONCE DAILY IN THE EVENING  Qty: 90 tablet, Refills: 3    Associated Diagnoses: Essential hypertension      lancets 30 gauge Misc Test blood sugar four times a day  Qty: 100 each, Refills: 11      liraglutide 0.6 mg/0.1 mL, 18 mg/3 mL, subq PNIJ (VICTOZA 3-TOMASZ) 0.6 mg/0.1 mL (18 mg/3 mL) PnIj Inject 1.8 mg into the skin once daily.  Qty: 9 mL, Refills: 11    Associated Diagnoses: Type 2 diabetes mellitus with hyperglycemia, with long-term current use of insulin; Controlled type 2 diabetes mellitus with diabetic nephropathy, with long-term current use of insulin      metFORMIN (GLUCOPHAGE-XR) 500 MG XR 24hr tablet TAKE 2 TABLETS BY MOUTH TWICE DAILY WITH MEALS  Qty: 360 tablet, Refills: 1    Associated Diagnoses: Type 2 diabetes mellitus with hyperglycemia, with long-term current use of insulin; Controlled type 2 diabetes mellitus with diabetic nephropathy, with long-term current use of insulin      !! methylphenidate HCl (RITALIN) 10 MG tablet Take 1 tablet (10 mg total) by mouth 2 (two) times daily with meals.  Qty: 60 tablet, Refills: 0    Associated Diagnoses: Fatigue,  "unspecified type      !! methylphenidate HCl (RITALIN) 10 MG tablet Take 1 tablet (10 mg total) by mouth 2 (two) times daily with meals.  Qty: 60 tablet, Refills: 0    Associated Diagnoses: Fatigue, unspecified type      !! methylphenidate HCl (RITALIN) 10 MG tablet Take 1 tablet (10 mg total) by mouth 2 (two) times daily with meals.  Qty: 60 tablet, Refills: 0    Associated Diagnoses: Fatigue, unspecified type      omega-3 fatty acids-vitamin E (FISH OIL) 1,000 mg Cap Take 1 capsule by mouth 2 (two) times daily.  Refills: 0    Associated Diagnoses: Hypertriglyceridemia      pen needle, diabetic 32 gauge x 5/32" Ndle USE 1 PEN NEEDLE 4 TIMES DAILY (  SINGLE  USE)  Qty: 350 each, Refills: 11      QUEtiapine (SEROQUEL) 25 MG Tab Take 1 tablet (25 mg total) by mouth every evening.  Qty: 30 tablet, Refills: 11    Associated Diagnoses: Dementia with behavioral disturbance, unspecified dementia type      SUPREP BOWEL PREP KIT 17.5-3.13-1.6 gram SolR USE AS DIRECTED      vitamin D 1000 units Tab Take 5 tablets (5,000 Units total) by mouth once daily.       !! - Potential duplicate medications found. Please discuss with provider.        Patient to continue ASA and Plavix together for 21 days following discharge from hospital. Will then take ASA alone without Plavix.     I certify that this patient is confined to his home and needs intermittent skilled nursing care, physical therapy, speech therapy and occupational therapy.      "

## 2020-08-03 NOTE — SUBJECTIVE & OBJECTIVE
Neurologic Chief Complaint: worsening confusion, slurred speech and right side weakness    Subjective:     Interval History: Patient is seen for follow-up neurological assessment and treatment recommendations:     Patient without acute events overnight. No new or worsening symptoms. Patient to go home with HH and rolling walker. Restarted home BP regimen on discharge. Follow up in stroke clinic in 4-6 weeks.     HPI, Past Medical, Family, and Social History remains the same as documented in the initial encounter.     Review of Systems   Constitutional: Negative for chills and fever.   HENT: Negative for trouble swallowing.    Eyes: Negative for visual disturbance.   Respiratory: Negative for shortness of breath.    Cardiovascular: Negative for chest pain.   Gastrointestinal: Negative for diarrhea, nausea and vomiting.   Neurological: Positive for speech difficulty. Negative for facial asymmetry, weakness, numbness and headaches.   Psychiatric/Behavioral: Negative for confusion and decreased concentration.     Scheduled Meds:   aspirin  81 mg Oral Daily    atorvastatin  40 mg Oral Daily    clopidogreL  75 mg Oral Daily    donepeziL  5 mg Oral QHS    heparin (porcine)  5,000 Units Subcutaneous Q8H    QUEtiapine  25 mg Oral QHS     Continuous Infusions:   sodium chloride 0.9%       PRN Meds:dextrose 50%, glucagon (human recombinant), insulin aspart U-100, labetaloL, ondansetron, sodium chloride 0.9%, sodium chloride 0.9%    Objective:     Vital Signs (Most Recent):  Temp: 98.5 °F (36.9 °C) (08/03/20 0736)  Pulse: 74 (08/03/20 1141)  Resp: 18 (08/03/20 0736)  BP: 122/65 (08/03/20 0736)  SpO2: (!) 91 % (08/03/20 0736)  BP Location: Right arm    Vital Signs Range (Last 24H):  Temp:  [97.5 °F (36.4 °C)-98.8 °F (37.1 °C)]   Pulse:  [74-86]   Resp:  [18]   BP: (122-198)/(65-94)   SpO2:  [91 %-96 %]   BP Location: Right arm    Physical Exam  Vitals signs reviewed.   Constitutional:       Appearance: Normal appearance.    HENT:      Head: Normocephalic.   Eyes:      Pupils: Pupils are equal, round, and reactive to light.   Cardiovascular:      Rate and Rhythm: Normal rate.   Pulmonary:      Effort: Pulmonary effort is normal.   Skin:     General: Skin is warm and dry.   Neurological:      Mental Status: He is alert and oriented to person, place, and time.   Psychiatric:         Speech: Speech is slurred.         Neurological Exam:   LOC: alert  Attention Span: Good   Language: No aphasia  Articulation: Dysarthria - improving   Orientation: Person, Place, Time   Visual Fields: Full  EOM (CN III, IV, VI): Full/intact  Facial Movement (CN VII): Symmetric facial expression    Motor: Arm left  Normal 5/5  Leg left  Normal 5/5  Arm right  Normal 5/5  Leg right Normal 5/5  Cebellar: No evidence of appendicular or axial ataxia  Sensation: Intact to light touch, temperature and vibration  Tone: Normal tone throughout    Laboratory:  CMP:   No results for input(s): GLUCOSE, CALCIUM, ALBUMIN, PROT, NA, K, CO2, CL, BUN, CREATININE, ALKPHOS, ALT, AST, BILITOT in the last 24 hours.  CBC:   Recent Labs   Lab 08/01/20  0903   WBC 5.42   RBC 3.74*   HGB 10.9*   HCT 34.8*   *   MCV 93   MCH 29.1   MCHC 31.3*     Lipid Panel:   Recent Labs   Lab 07/30/20  1305   CHOL 175   LDLCALC 65.6   HDL 55   TRIG 272*     Hgb A1C:   Recent Labs   Lab 07/30/20  1900   HGBA1C 7.2*     TSH:   Recent Labs   Lab 07/30/20  1305   TSH 3.144       Diagnostic Results     Brain/Vessel imaging:     CT Head (7/30/2020) -              Interval development of focal hypodensity right thalamus concerning for lacunar type infarction overall age indeterminate may be recent.  There is intermediate density along the posterior aspect cannot exclude petechial hemorrhage or mineralization.     No evidence for acute extra-axial hemorrhage or hydrocephalus.     Scattered patchy and confluent regions of decreased attenuation supratentorial white matter suggestive for chronic  ischemic change with remote left corona radiata infarction.  In addition there are remote bilateral cerebellar infarcts.  MRI Brain (7/24/2020) -             Acute infarct in the right corona radiata.  Chronic ischemic changes.     Cardiac Evaluation:      TTE  7/31/2020  · Technically challenging study  · Normal left ventricular systolic function. The estimated ejection fraction is 60%.  · Concentric left ventricular remodeling.  · Indeterminate left ventricular diastolic function.  · No wall motion abnormalities.  · Normal right ventricular systolic function.  · Mild left atrial enlargement.  · Normal central venous pressure (3 mmHg).     (3/9/2020) -      · Concentric left ventricular remodeling. Normal left ventricular systolic function. The estimated ejection fraction is 55%.  · Grade II (moderate) left ventricular diastolic dysfunction consistent with pseudonormalization.  · Severe left atrial enlargement.  · Normal right ventricular systolic function.  · Mild right atrial enlargement.  · Normal central venous pressure (3 mmHg).  · The estimated PA systolic pressure is 20 mmHg.

## 2020-08-03 NOTE — DISCHARGE SUMMARY
[facility]  Vascular Neurology  Comprehensive Stroke Center  Discharge Summary     Summary:     Admit Date: 7/30/2020 12:28 PM    Discharge Date and Time:  08/03/2020 2:30 PM    Attending Physician: Isael Matias MD     Discharge Provider: Eugenio Gunn MD    History of Present Illness: 61 y/o male with PMH of CNS vasculitis on Rituxan (last dose on 5/14/2020), HTN, T2DM, who presents to the ER with worsening right sided weakness, slurred speech and confusion per the wife. LKN was yesterday evening (7/29).     Wife says that they got back home from [place] yesterday evening and he appeared to be confused. She assumed he was tired from a long journey. Woke up at 9am today and was still a bit confused. When he was getting out of a car at around 9:30, she noticed that he weak in his right leg and having a hard time walking. He was also having a hard time 'sitting himself up'. His speech was also slurred. At baseline, he has no weakness and speaks clearly per the wife.  In the ER, his vitals were stable. BG was around 190. CT head showed a new right thalamic infarct compared to his prior.    Follows in [place] clinic with Dr Love for CNS vasculitis. Angiogram in 2017 showed multifocal areas of narrowing in the intracranial anterior and posterior vessels. LP showed an elevated protein of 93.    At baseline, he is able to carry out his own ADLs.    Hospital Course (synopsis of major diagnoses, care, treatment, and services provided during the course of the hospital stay): 7/31: Oriented x 3 today. Mild dysarthria. ASA 81 mg, Lipitor 40 mg. Echo completed. Etiology for neurologic findings believed to be more likely secondary to small vessel disease causing CVA rather than CNS vasculitis however if the patient were to experience similar events in the future it could still be considered. Therapy recommended  for ongoing therapy. Patient without any further new or worsening symptoms. He is going  home with home health on 8/3. Restarted home diltiazem on discharge. Discharging with walker and HH. Discharging with another 21 days of joint DAPT with plavix and ASA followed by ASA monotherapy. Follow up in stroke clinic in 4-6 weeks. All information relayed to patient and family prior to discharge.     Neurologic Chief Complaint: worsening confusion, slurred speech and right side weakness    Subjective:     Interval History: Patient is seen for follow-up neurological assessment and treatment recommendations:     Patient without acute events overnight. No new or worsening symptoms. Patient to go home with HH and rolling walker. Restarted home BP regimen on discharge. Follow up in stroke clinic in 4-6 weeks.     HPI, Past Medical, Family, and Social History remains the same as documented in the initial encounter.     Review of Systems   Constitutional: Negative for chills and fever.   HENT: Negative for trouble swallowing.    Eyes: Negative for visual disturbance.   Respiratory: Negative for shortness of breath.    Cardiovascular: Negative for chest pain.   Gastrointestinal: Negative for diarrhea, nausea and vomiting.   Neurological: Positive for speech difficulty. Negative for facial asymmetry, weakness, numbness and headaches.   Psychiatric/Behavioral: Negative for confusion and decreased concentration.     Scheduled Meds:   aspirin  81 mg Oral Daily    atorvastatin  40 mg Oral Daily    clopidogreL  75 mg Oral Daily    donepeziL  5 mg Oral QHS    heparin (porcine)  5,000 Units Subcutaneous Q8H    QUEtiapine  25 mg Oral QHS     Continuous Infusions:   sodium chloride 0.9%       PRN Meds:dextrose 50%, glucagon (human recombinant), insulin aspart U-100, labetaloL, ondansetron, sodium chloride 0.9%, sodium chloride 0.9%    Objective:     Vital Signs (Most Recent):  Temp: 98.5 °F (36.9 °C) (08/03/20 0736)  Pulse: 74 (08/03/20 1141)  Resp: 18 (08/03/20 0736)  BP: 122/65 (08/03/20 0736)  SpO2: (!) 91 % (08/03/20  0736)  BP Location: Right arm    Vital Signs Range (Last 24H):  Temp:  [97.5 °F (36.4 °C)-98.8 °F (37.1 °C)]   Pulse:  [74-86]   Resp:  [18]   BP: (122-198)/(65-94)   SpO2:  [91 %-96 %]   BP Location: Right arm    Physical Exam  Vitals signs reviewed.   Constitutional:       Appearance: Normal appearance.   HENT:      Head: Normocephalic.   Eyes:      Pupils: Pupils are equal, round, and reactive to light.   Cardiovascular:      Rate and Rhythm: Normal rate.   Pulmonary:      Effort: Pulmonary effort is normal.   Skin:     General: Skin is warm and dry.   Neurological:      Mental Status: He is alert and oriented to person, place, and time.   Psychiatric:         Speech: Speech is slurred.         Neurological Exam:   LOC: alert  Attention Span: Good   Language: No aphasia  Articulation: Dysarthria - improving   Orientation: Person, Place, Time   Visual Fields: Full  EOM (CN III, IV, VI): Full/intact  Facial Movement (CN VII): Symmetric facial expression    Motor: Arm left  Normal 5/5  Leg left  Normal 5/5  Arm right  Normal 5/5  Leg right Normal 5/5  Cebellar: No evidence of appendicular or axial ataxia  Sensation: Intact to light touch, temperature and vibration  Tone: Normal tone throughout    Laboratory:  CMP:   No results for input(s): GLUCOSE, CALCIUM, ALBUMIN, PROT, NA, K, CO2, CL, BUN, CREATININE, ALKPHOS, ALT, AST, BILITOT in the last 24 hours.  CBC:   Recent Labs   Lab 08/01/20  0903   WBC 5.42   RBC 3.74*   HGB 10.9*   HCT 34.8*   *   MCV 93   MCH 29.1   MCHC 31.3*     Lipid Panel:   Recent Labs   Lab 07/30/20  1305   CHOL 175   LDLCALC 65.6   HDL 55   TRIG 272*     Hgb A1C:   Recent Labs   Lab 07/30/20  1900   HGBA1C 7.2*     TSH:   Recent Labs   Lab 07/30/20  1305   TSH 3.144       Diagnostic Results     Brain/Vessel imaging:     CT Head (7/30/2020) -              Interval development of focal hypodensity right thalamus concerning for lacunar type infarction overall age indeterminate may be  recent.  There is intermediate density along the posterior aspect cannot exclude petechial hemorrhage or mineralization.     No evidence for acute extra-axial hemorrhage or hydrocephalus.     Scattered patchy and confluent regions of decreased attenuation supratentorial white matter suggestive for chronic ischemic change with remote left corona radiata infarction.  In addition there are remote bilateral cerebellar infarcts.  MRI Brain (7/24/2020) -             Acute infarct in the right corona radiata.  Chronic ischemic changes.     Cardiac Evaluation:      TTE  7/31/2020  · Technically challenging study  · Normal left ventricular systolic function. The estimated ejection fraction is 60%.  · Concentric left ventricular remodeling.  · Indeterminate left ventricular diastolic function.  · No wall motion abnormalities.  · Normal right ventricular systolic function.  · Mild left atrial enlargement.  · Normal central venous pressure (3 mmHg).     (3/9/2020) -      · Concentric left ventricular remodeling. Normal left ventricular systolic function. The estimated ejection fraction is 55%.  · Grade II (moderate) left ventricular diastolic dysfunction consistent with pseudonormalization.  · Severe left atrial enlargement.  · Normal right ventricular systolic function.  · Mild right atrial enlargement.  · Normal central venous pressure (3 mmHg).  · The estimated PA systolic pressure is 20 mmHg.           Stroke Etiology: Probable Small Artery Occlusion (MAGALI)    STROKE DOCUMENTATION   Acute Stroke Times   Last Known Normal Date: 07/29/20  Last Known Normal Time: (Sometime yesterday evening)  Symptom Onset Date: 07/30/20  Symptom Onset Time: 0930  Stroke Team Called Date: 07/30/20  Stroke Team Called Time: 1226  Stroke Team Arrival Date: 07/30/20  Stroke Team Arrival Time: 1226  CT Interpretation Time: 1239     NIH Scale:  1a. Level of Consciousness: 0-->Alert, keenly responsive  1b. LOC Questions: 0-->Answers both questions  correctly  1c. LOC Commands: 0-->Performs both tasks correctly  2. Best Gaze: 0-->Normal  3. Visual: 0-->No visual loss  4. Facial Palsy: 0-->Normal symmetrical movements  5a. Motor Arm, Left: 0-->No drift, limb holds 90 (or 45) degrees for full 10 secs  5b. Motor Arm, Right: 0-->No drift, limb holds 90 (or 45) degrees for full 10 secs  6a. Motor Leg, Left: 0-->No drift, leg holds 30 degree position for full 5 secs  6b. Motor Leg, Right: 0-->No drift, leg holds 30 degree position for full 5 secs  7. Limb Ataxia: 0-->Absent  8. Sensory: 0-->Normal, no sensory loss  9. Best Language: 0-->No aphasia, normal  10. Dysarthria: 1-->Mild-to-moderate dysarthria, patient slurs at least some words and, at worst, can be understood with some difficulty  11. Extinction and Inattention (formerly Neglect): 0-->No abnormality  Total (NIH Stroke Scale): 1        Modified [place] Score: 0  [place] Coma Scale:    ABCD2 Score:    [id number] Score:   HAS -BLED Score:   ICH Score:   Hunt & Wilson Classification:       Assessment/Plan:     Diagnostic Results:    All pertinent imaging reviewed over hospitalization.    Interventions: None    Complications: None    Disposition: Home or Self Care    Final Active Diagnoses:    Diagnosis Date Noted POA    PRINCIPAL PROBLEM:  Acute right MCA stroke [I63.511] 07/30/2020 Yes    Cytotoxic cerebral edema [G93.6] 07/31/2020 Yes    Dementia with behavioral disturbance [F03.91] 04/30/2019 Yes     Chronic    CNS vasculitis failed imuran; failed cytoxan; 1/2020 rituxan [I77.6] 06/02/2017 Yes    Encephalopathy [G93.40] 05/26/2017 Yes    Type 2 diabetes mellitus with diabetic polyneuropathy, with long-term current use of insulin [E11.42, Z79.4] 05/14/2017 Not Applicable     Chronic     JOHN with CPAP at night [G47.33] 10/11/2013 Yes    Mixed hyperlipidemia [E78.2] 11/09/2012 Yes     Chronic    Essential hypertension 03/09/2020 TTE concentric LV remodeling.  Normal LV systolic function LVEF 55%.   "Grade 2 diastolic dysfunction.  Normal CVP.  PA pressure 20. [I10] 11/09/2012 Yes     Chronic      Problems Resolved During this Admission:     No new Assessment & Plan notes have been filed under this hospital service since the last note was generated.  Service: Vascular Neurology      Recommendations:     Post-discharge complication risks: None    Stroke Education given to: patient and family    Follow-up in Stroke Clinic in 4-6 weeks     Discharge Plan:  Statin: Atorvastatin 40mg  ASA and plavix x21 days followed by ASA monotherapy  Aggresive risk factor modification:  Hypertension  Exercise  Obesity    Follow Up:  Follow-up Information     Edgar Nova MD.    Specialty: Internal Medicine  Why: Outpatient Services  Contact information:  [address] [facility]  [place] [id number] [phone]             [facility] In 1 month.    Specialty: Emergency Medicine  Contact information:  [address] [place]  [facility] [id number] [phone]                 Patient Instructions:      WALKER FOR HOME USE     Order Specific Question Answer Comments   Type of Walker: Adult (5'4"-6'6")    With wheels? Yes    Height: 5' 10" (1.778 m)    Weight: 99.3 kg (219 lb)    Length of need (1-99 months): 99    Does patient have medical equipment at home? 3-in-1 commode    Does patient have medical equipment at home? cane, quad    Does patient have medical equipment at home? rollator    Please check all that apply: Patient is unable to safely ambulate without equipment.      Ambulatory referral/consult to Vascular Neurology   Standing Status: Future   Referral Priority: Routine Referral Type: Consultation   Referral Reason: Specialty Services Required   Requested Specialty: Vascular Neurology   Number of Visits Requested: 1       Medications:  Reconciled Home Medications:      Medication List      START taking these medications    clopidogreL 75 mg tablet  Commonly known as: PLAVIX  Take 1 tablet (75 mg " total) by mouth once daily.  Start taking on: August 4, 2020        CHANGE how you take these medications    alendronate 70 MG tablet  Commonly known as: FOSAMAX  Take 1 tablet (70 mg total) by mouth every 7 days.  What changed: additional instructions     sertraline 100 MG tablet  Commonly known as: ZOLOFT  1.5 tablet daily  What changed:   · how much to take  · how to take this  · when to take this  · additional instructions        CONTINUE taking these medications    aspirin 81 MG EC tablet  Commonly known as: ECOTRIN  Take 81 mg by mouth once daily.     atenoloL 50 MG tablet  Commonly known as: TENORMIN  Take 1 tablet (50 mg total) by mouth once daily.     atorvastatin 40 MG tablet  Commonly known as: LIPITOR  TAKE 1 TABLET BY MOUTH ONCE DAILY     blood sugar diagnostic Strp  Commonly known as: TRUE METRIX GLUCOSE TEST STRIP  Test blood sugar four times a day     diltiaZEM 240 MG Cdcr  Commonly known as: DILT-XR  Take 1 capsule (240 mg total) by mouth once daily.     donepeziL 5 MG tablet  Commonly known as: ARICEPT  Take 1 tablet (5 mg total) by mouth every evening.     FREESTYLE ARELY 14 DAY READER Misc  Generic drug: flash glucose scanning reader  GLUCOSE TESTING : FASTING AND PRIOR TO MEALS     FREESTYLE ARELY 14 DAY SENSOR Kit  Generic drug: flash glucose sensor  GLUCOSE TESTING 4 TIMES A DAY     insulin aspart U-100 100 unit/mL (3 mL) Inpn pen  Commonly known as: NovoLOG  Inject 1-10 Units into the skin before meals and at bedtime as needed (Hyperglycemia).     insulin glargine 100 units/mL (3mL) SubQ pen  Commonly known as: BASAGLAR KWIKPEN U-100 INSULIN  Inject 25 Units into the skin once daily. Up to 60 BID with cytoxan     irbesartan 300 MG tablet  Commonly known as: AVAPRO  TAKE 1 TABLET BY MOUTH ONCE DAILY IN THE EVENING     liraglutide 0.6 mg/0.1 mL (18 mg/3 mL) subq PNIJ 0.6 mg/0.1 mL (18 mg/3 mL) Pnij pen  Commonly known as: VICTOZA 3-TOMASZ  Inject 1.8 mg into the skin once daily.     metFORMIN 500  "MG XR 24hr tablet  Commonly known as: GLUCOPHAGE-XR  TAKE 2 TABLETS BY MOUTH TWICE DAILY WITH MEALS     omega-3 fatty acids-vitamin E 1,000 mg Cap  Commonly known as: FISH OIL  Take 1 capsule by mouth 2 (two) times daily.     pen needle, diabetic 32 gauge x 5/32" Ndle  USE 1 PEN NEEDLE 4 TIMES DAILY (  SINGLE  USE)     QUEtiapine 25 MG Tab  Commonly known as: SEROQUEL  Take 1 tablet (25 mg total) by mouth every evening.     SUPREP BOWEL PREP KIT 17.5-3.13-1.6 gram Solr  Generic drug: sodium,potassium,mag sulfates  USE AS DIRECTED     TRUEPLUS LANCETS 30 gauge Misc  Generic drug: lancets  Test blood sugar four times a day     vitamin D 1000 units Tab  Commonly known as: VITAMIN D3  Take 5 tablets (5,000 Units total) by mouth once daily.        ASK your doctor about these medications    * methylphenidate HCl 10 MG tablet  Commonly known as: RITALIN  Take 1 tablet (10 mg total) by mouth 2 (two) times daily with meals.     * methylphenidate HCl 10 MG tablet  Commonly known as: RITALIN  Take 1 tablet (10 mg total) by mouth 2 (two) times daily with meals.  Start taking on: August 22, 2020     * methylphenidate HCl 10 MG tablet  Commonly known as: RITALIN  Take 1 tablet (10 mg total) by mouth 2 (two) times daily with meals.  Start taking on: September 22, 2020         * This list has 3 medication(s) that are the same as other medications prescribed for you. Read the directions carefully, and ask your doctor or other care provider to review them with you.                Eugenio Gunn MD  [facility]  Department of Vascular Neurology   [facility]   "

## 2020-08-03 NOTE — PLAN OF CARE
Problem: Fall Injury Risk  Goal: Absence of Fall and Fall-Related Injury  Outcome: Ongoing, Progressing     Problem: Hemodynamic Instability (Stroke, Ischemic/Transient Ischemic Attack)  Goal: Vital Signs Remain in Desired Range  Outcome: Ongoing, Progressing     Problem: Adult Inpatient Plan of Care  Goal: Plan of Care Review  Outcome: Ongoing, Progressing     Patient is AAO x4. POC reviewed with patient. Patient verbalized understanding. Patient's breathing is unlabored with equal chest expansion. Patient denies any numbness and tingling. Patient voids per condom catheter. Patient remained free from falls. Patient did not rest as well during this shift. Bed in lowest position,bed alarm on, side rails up x3, no complaints or signs of distress. WCTM.  See flowsheets for full assessment and VS info.

## 2020-08-03 NOTE — PT/OT/SLP PROGRESS
Physical Therapy Treatment    Patient Name:  Bessy Nunez   MRN:  [id number]    Recommendations:     Discharge Recommendations:  home with home health   Discharge Equipment Recommendations: walker, rolling   Barriers to discharge: None    Assessment:     Bessy Nunez is a 60 y.o. male admitted with a medical diagnosis of Acute right MCA stroke.  He presents with the following impairments/functional limitations:  gait instability, impaired endurance, impaired balance, impaired coordination, decreased safety awareness, impaired self care skills, impaired cognition, impaired functional mobilty. Pt tolerated PT treatment well today. He was able to ambulate around 180 feet with CGA-min A using a RW. Pt demonstrated poor safety awareness throughout the session, and needed verbal and tactile cueing for safe use of RW. Had a long discussion with pt's wife about the benefits of home health therapy, and addressed all of her questions and concerns. Pt is progressing well.     Rehab Prognosis: Good; patient would benefit from acute skilled PT services to address these deficits and reach maximum level of function.    Recent Surgery: * No surgery found *      Plan:     During this hospitalization, patient to be seen 4 x/week to address the identified rehab impairments via gait training, therapeutic activities, therapeutic exercises, neuromuscular re-education and progress toward the following goals:    · Plan of Care Expires:  08/30/20    Subjective     Chief Complaint: Tired  Patient/Family Comments/goals: To walk  Pain/Comfort:  · Pain Rating 1: 0/10  · Pain Rating Post-Intervention 1: 0/10      Objective:     Communicated with nursing prior to session.  Patient found supine with bed alarm, telemetry, Condom Catheter upon PT entry to room.     General Precautions: Standard, fall, aspiration   Orthopedic Precautions:N/A   Braces:       Functional Mobility:  · Bed Mobility:     · Rolling Left:  supervision  · Rolling Right:  supervision  · Supine to Sit: stand by assistance  · Transfers:     · Sit to Stand:  contact guard assistance with rolling walker  · Bed to Chair: minimum assistance with  rolling walker  using  Stand Pivot  · Gait: ~180 feet with CGA-min A using a RW. Pt was unsteady at times, and needed verbal and tactile cueing for safet RW negotiation and gait sequencing. He tends to veer to the L with gait training.       AM-PAC 6 CLICK MOBILITY  Turning over in bed (including adjusting bedclothes, sheets and blankets)?: 3  Sitting down on and standing up from a chair with arms (e.g., wheelchair, bedside commode, etc.): 3  Moving from lying on back to sitting on the side of the bed?: 3  Moving to and from a bed to a chair (including a wheelchair)?: 3  Need to walk in hospital room?: 3  Climbing 3-5 steps with a railing?: 3  Basic Mobility Total Score: 18       Therapeutic Activities and Exercises:   Pt educated on PT goals, plan of care, in-room safety, and the importance of mobility. Had a long discussion with pt's wife about her concerns about returning home. She was educated on HH services, and the benefits of in home training for patients with dementia. All questions and concerns were addressed, and she reported feeling much better about possible d/c home.     Patient left reclined in bedside chair with call button in reach and wife present..    GOALS:   Multidisciplinary Problems     Physical Therapy Goals        Problem: Physical Therapy Goal    Goal Priority Disciplines Outcome Goal Variances Interventions   Physical Therapy Goal     PT, PT/OT Ongoing, Progressing     Description: Goals to be met by:      Patient will increase functional independence with mobility by performin. Supine to sit with Set-up [place]  2. Sit to supine with Set-up [place]  3. Sit to stand transfer with Supervision  4. Gait  x 200 feet with Supervision using Rolling Walker.   5. Lower extremity exercise program x15 reps per  handout, with independence to improve strength and activity tolerance.                      Time Tracking:     PT Received On: 08/03/20  PT Start Time: 1023     PT Stop Time: 1047  PT Total Time (min): 24 min     Billable Minutes: Gait Training 12 and Therapeutic Activity 12    Treatment Type: Treatment  PT/PTA: PT     PTA Visit Number: 0     Myra Valderrama, PT  08/03/2020

## 2020-08-04 ENCOUNTER — PATIENT OUTREACH (OUTPATIENT)
Dept: ADMINISTRATIVE | Facility: CLINIC | Age: 60
End: 2020-08-04

## 2020-08-04 PROBLEM — Z86.73 HISTORY OF STROKE: Status: ACTIVE | Noted: 2017-09-14

## 2020-08-04 NOTE — PATIENT INSTRUCTIONS
Discharge Instructions for Stroke  You have been diagnosed with a stroke, or with a TIA (transient ischemic attack). Or you have been identified as having a high risk for stroke. During a stroke, blood stops flowing to part of your brain. This can damage areas in the brain that control other parts of the body. Symptoms after a stroke depend on which part of the brain has been affected.  Stroke risk factors  Once youve had a stroke, youre at greater risk for another one. Listed below are some other factors that can increase your risk for a stroke:  · High blood pressure  · High cholesterol  · Cigarette or cigar smoking  · Diabetes  · Carotid or other artery disease  · Atrial fibrillation, atrial flutter, or other heart disease  · Not being physically active  · Obesity  · Certain blood disorders (such as sickle cell anemia)  · Excessive alcohol use  · Abuse of street drugs  · Race  · Gender  · Family history of stroke  · Diet high in salty, fried, or greasy foods  Changes in daily living  Doing your regular tasks may be difficult after youve had a stroke, but you can learn new ways to manage your daily activities. In fact, doing daily activities may help you to regain muscle strength and bring back function to affected limbs. Be patient, give yourself time to adjust, and appreciate the progress you make.  Daily activities  You may be at risk of falling. Make changes to your home to help you walk more easily. A therapist will decide if you need an assistive device to walk safely.  You may need to see an occupational therapist or physical therapist to learn new ways of doing things. For example, you may need to make adjustments when bathing or dressing:  Tips for showering or bathing  · Test the water temperature with a hand or foot that was not affected by the stroke.  · Use grab bars, a shower seat, a hand-held showerhead, and a long-handled brush.  Tips for getting dressed  · Dress while sitting, starting with  the affected side or limb.  · Wear shirts that pull easily over your head. Wear pants or skirts with elastic waistbands.  · Use zippers with loops attached to the pull tabs.  Lifestyle changes  · Take your medicines exactly as directed. Dont skip doses.  · Begin an exercise program. Ask your provider how to get started. Also ask how much activity you should try to get on a daily or weekly basis. You can benefit from simple activities such as walking or gardening.  · Limit alcohol intake. Men should have no more than 2 alcoholic drinks a day. Women should limit themselves to 1 alcoholic drink per day.  · Know your cholesterol level. Follow your providers recommendations about how to keep cholesterol under control.  · If you are a smoker, quit now. Join a stop-smoking program to improve your chances of success. Ask your provider about medicines or other methods to help you quit.  · Learn stress management techniques to help you deal with stress in your home and work life.  Diet  Your healthcare provider will give you information on dietary changes that you may need to make, based on your situation. Your provider may recommend that you see a registered dietitian for help with diet changes. Changes may include:  · Reducing fat and cholesterol intake  · Reducing salt (sodium) intake, especially if you have high blood pressure  · Eating more fresh vegetables and fruits  · Eating more lean proteins, such as fish, poultry, and beans and peas (legumes)  · Eating less red meat and processed meats  · Using low-fat dairy products  · Limiting vegetable oils and nut oils  · Limiting sweets and processed foods such as chips, cookies, and baked goods  Follow-up care  · Keep your medical appointments. Close follow-up is important to stroke rehabilitation and recovery.  · Some medicines require blood tests to check for progress or problems. Keep follow-up appointments for any blood tests ordered by your providers.  When to call  911  Call 911 right away if you have any of the following symptoms of stroke:  · Weakness, tingling, or loss of feeling on one side of your face or body  · Sudden double vision or trouble seeing in one or both eyes  · Sudden trouble talking or slurred speech  · Trouble understanding others  · Sudden, severe headache  · Dizziness, loss of balance, or a sense of falling  · Blackouts or seizures      F.A.S.T. is an easy way to remember the signs of stroke. When you see these signs, you know that you need to call 911 fast.  F.A.S.T. stands for:  · F is for face drooping. One side of the face is drooping or numb. When the person smiles, the smile is uneven.  · A is for arm weakness. One arm is weak or numb. When the person lifts both arms at the same time, one arm may drift downward.  · S is for speech difficulty. You may notice slurred speech or trouble speaking. The person can't repeat a simple sentence correctly when asked.  · T is for time to call 911. If someone shows any of these symptoms, even if they go away, call 911 immediately. Make note of the time the symptoms first appeared.    © 9209-0453 [employer]. [address] [place] [address], [place] [address]. All rights reserved. This information is not intended as a substitute for professional medical care. Always follow your healthcare professional's instructions.

## 2020-08-05 ENCOUNTER — TELEPHONE (OUTPATIENT)
Dept: NEUROLOGY | Facility: CLINIC | Age: 60
End: 2020-08-05

## 2020-08-05 ENCOUNTER — OFFICE VISIT (OUTPATIENT)
Dept: FAMILY MEDICINE | Facility: CLINIC | Age: 60
End: 2020-08-05
Payer: MEDICARE

## 2020-08-05 VITALS
OXYGEN SATURATION: 96 % | BODY MASS INDEX: 31.42 KG/M2 | HEART RATE: 64 BPM | DIASTOLIC BLOOD PRESSURE: 60 MMHG | HEIGHT: 70 IN | SYSTOLIC BLOOD PRESSURE: 112 MMHG | TEMPERATURE: 98 F

## 2020-08-05 DIAGNOSIS — F03.91 DEMENTIA WITH BEHAVIORAL DISTURBANCE, UNSPECIFIED DEMENTIA TYPE: Chronic | ICD-10-CM

## 2020-08-05 DIAGNOSIS — S32.040S CLOSED COMPRESSION FRACTURE OF FOURTH LUMBAR VERTEBRA, SEQUELA: ICD-10-CM

## 2020-08-05 DIAGNOSIS — Z86.73 HISTORY OF STROKE: Primary | ICD-10-CM

## 2020-08-05 DIAGNOSIS — E11.42 TYPE 2 DIABETES MELLITUS WITH DIABETIC POLYNEUROPATHY, WITH LONG-TERM CURRENT USE OF INSULIN: Chronic | ICD-10-CM

## 2020-08-05 DIAGNOSIS — D69.6 THROMBOCYTOPENIA, UNSPECIFIED: ICD-10-CM

## 2020-08-05 DIAGNOSIS — F32.5 MAJOR DEPRESSIVE DISORDER WITH SINGLE EPISODE, IN FULL REMISSION: Chronic | ICD-10-CM

## 2020-08-05 DIAGNOSIS — D64.81 ANEMIA ASSOCIATED WITH CHEMOTHERAPY: ICD-10-CM

## 2020-08-05 DIAGNOSIS — Z79.4 TYPE 2 DIABETES MELLITUS WITH DIABETIC POLYNEUROPATHY, WITH LONG-TERM CURRENT USE OF INSULIN: Chronic | ICD-10-CM

## 2020-08-05 DIAGNOSIS — Z79.4 CONTROLLED TYPE 2 DIABETES MELLITUS WITH DIABETIC NEPHROPATHY, WITH LONG-TERM CURRENT USE OF INSULIN: ICD-10-CM

## 2020-08-05 DIAGNOSIS — R39.81 URINARY INCONTINENCE DUE TO COGNITIVE IMPAIRMENT: ICD-10-CM

## 2020-08-05 DIAGNOSIS — G93.40 ENCEPHALOPATHY: ICD-10-CM

## 2020-08-05 DIAGNOSIS — D69.59 CHEMOTHERAPY-INDUCED THROMBOCYTOPENIA: ICD-10-CM

## 2020-08-05 DIAGNOSIS — E78.2 MIXED HYPERLIPIDEMIA: Chronic | ICD-10-CM

## 2020-08-05 DIAGNOSIS — E11.21 CONTROLLED TYPE 2 DIABETES MELLITUS WITH DIABETIC NEPHROPATHY, WITH LONG-TERM CURRENT USE OF INSULIN: ICD-10-CM

## 2020-08-05 DIAGNOSIS — I77.6 CNS VASCULITIS: ICD-10-CM

## 2020-08-05 DIAGNOSIS — G93.6 CYTOTOXIC CEREBRAL EDEMA: ICD-10-CM

## 2020-08-05 DIAGNOSIS — R41.89 COGNITIVE IMPAIRMENT: ICD-10-CM

## 2020-08-05 DIAGNOSIS — T45.1X5A CHEMOTHERAPY-INDUCED THROMBOCYTOPENIA: ICD-10-CM

## 2020-08-05 DIAGNOSIS — G47.33 OSA (OBSTRUCTIVE SLEEP APNEA): ICD-10-CM

## 2020-08-05 DIAGNOSIS — I10 ESSENTIAL HYPERTENSION: Chronic | ICD-10-CM

## 2020-08-05 DIAGNOSIS — T45.1X5A ANEMIA ASSOCIATED WITH CHEMOTHERAPY: ICD-10-CM

## 2020-08-05 DIAGNOSIS — M80.80XD OTHER OSTEOPOROSIS WITH CURRENT PATHOLOGICAL FRACTURE WITH ROUTINE HEALING, SUBSEQUENT ENCOUNTER: ICD-10-CM

## 2020-08-05 PROBLEM — I63.511 ACUTE RIGHT MCA STROKE: Status: RESOLVED | Noted: 2020-07-30 | Resolved: 2020-08-05

## 2020-08-05 PROBLEM — R79.89 ELEVATED TROPONIN: Status: RESOLVED | Noted: 2020-03-09 | Resolved: 2020-08-05

## 2020-08-05 PROCEDURE — 99499 RISK ADDL DX/OHS AUDIT: ICD-10-PCS | Mod: S$GLB,,, | Performed by: INTERNAL MEDICINE

## 2020-08-05 PROCEDURE — 99999 PR PBB SHADOW E&M-EST. PATIENT-LVL IV: ICD-10-PCS | Mod: PBBFAC,,, | Performed by: INTERNAL MEDICINE

## 2020-08-05 PROCEDURE — 99495 TRANSJ CARE MGMT MOD F2F 14D: CPT | Mod: S$GLB,,, | Performed by: INTERNAL MEDICINE

## 2020-08-05 PROCEDURE — 99495 TCM SERVICES (MODERATE COMPLEXITY): ICD-10-PCS | Mod: S$GLB,,, | Performed by: INTERNAL MEDICINE

## 2020-08-05 PROCEDURE — 99499 UNLISTED E&M SERVICE: CPT | Mod: S$GLB,,, | Performed by: INTERNAL MEDICINE

## 2020-08-05 PROCEDURE — 99999 PR PBB SHADOW E&M-EST. PATIENT-LVL IV: CPT | Mod: PBBFAC,,, | Performed by: INTERNAL MEDICINE

## 2020-08-05 RX ORDER — SERTRALINE HYDROCHLORIDE 100 MG/1
TABLET, FILM COATED ORAL
Qty: 135 TABLET | Refills: 3 | Status: SHIPPED | OUTPATIENT
Start: 2020-08-05 | End: 2021-02-01 | Stop reason: SDUPTHER

## 2020-08-05 RX ORDER — OXYBUTYNIN CHLORIDE 5 MG/1
5 TABLET, EXTENDED RELEASE ORAL DAILY
Qty: 30 TABLET | Refills: 11 | Status: ON HOLD | OUTPATIENT
Start: 2020-08-05 | End: 2021-08-05

## 2020-08-05 RX ORDER — ATORVASTATIN CALCIUM 40 MG/1
40 TABLET, FILM COATED ORAL DAILY
Qty: 90 TABLET | Refills: 3 | Status: SHIPPED | OUTPATIENT
Start: 2020-08-05 | End: 2021-02-01 | Stop reason: SDUPTHER

## 2020-08-05 RX ORDER — ALENDRONATE SODIUM 70 MG/1
70 TABLET ORAL
Qty: 4 TABLET | Refills: 11 | Status: SHIPPED | OUTPATIENT
Start: 2020-08-05 | End: 2021-01-07 | Stop reason: SDUPTHER

## 2020-08-05 RX ORDER — DONEPEZIL HYDROCHLORIDE 5 MG/1
5 TABLET, FILM COATED ORAL NIGHTLY
Qty: 90 TABLET | Refills: 3 | Status: SHIPPED | OUTPATIENT
Start: 2020-08-05 | End: 2021-02-01 | Stop reason: SDUPTHER

## 2020-08-05 RX ORDER — IRBESARTAN 300 MG/1
300 TABLET ORAL NIGHTLY
Qty: 90 TABLET | Refills: 3 | Status: SHIPPED | OUTPATIENT
Start: 2020-08-05 | End: 2021-02-01 | Stop reason: SDUPTHER

## 2020-08-05 RX ORDER — ATENOLOL 50 MG/1
50 TABLET ORAL DAILY
Qty: 90 TABLET | Refills: 3 | Status: SHIPPED | OUTPATIENT
Start: 2020-08-05 | End: 2021-02-01 | Stop reason: SDUPTHER

## 2020-08-05 RX ORDER — INSULIN GLARGINE 100 [IU]/ML
25 INJECTION, SOLUTION SUBCUTANEOUS 2 TIMES DAILY
Qty: 2 BOX | Refills: 11 | Status: SHIPPED | OUTPATIENT
Start: 2020-08-05 | End: 2020-08-12 | Stop reason: SDUPTHER

## 2020-08-05 RX ORDER — DILTIAZEM HYDROCHLORIDE 240 MG/1
240 CAPSULE, EXTENDED RELEASE ORAL DAILY
Qty: 90 CAPSULE | Refills: 3 | Status: SHIPPED | OUTPATIENT
Start: 2020-08-05 | End: 2021-02-01 | Stop reason: SDUPTHER

## 2020-08-05 RX ORDER — LIRAGLUTIDE 6 MG/ML
1.8 INJECTION SUBCUTANEOUS DAILY
Qty: 9 ML | Refills: 11 | Status: SHIPPED | OUTPATIENT
Start: 2020-08-05 | End: 2021-02-01 | Stop reason: SDUPTHER

## 2020-08-05 RX ORDER — INSULIN ASPART 100 [IU]/ML
10 INJECTION, SOLUTION INTRAVENOUS; SUBCUTANEOUS
Qty: 2 BOX | Refills: 3 | Status: SHIPPED | OUTPATIENT
Start: 2020-08-05 | End: 2020-08-12 | Stop reason: SDUPTHER

## 2020-08-05 NOTE — PROGRESS NOTES
This note was created by combination of typed  and M-Modal dictation.  Transcription errors may be present.  If there are any questions, please contact me.    Assessment and Plan:   History of stroke 9/2017 L thalamus; 7/2020 R thalamus; felt to be due to small vessel disease, not vasculitis  Cytotoxic cerebral edema  Encephalopathy  -discharged on aspirin and Plavix with plans to take dual antiplatelet therapy for 3 weeks and then aspirin monotherapy.  On statin.  Blood pressure controlled.  Felt to be due to small-vessel disease.    Dementia with behavioral disturbance, unspecified dementia type  Cognitive impairment  -continue donepezil low-dose 5 mg  -     donepeziL (ARICEPT) 5 MG tablet; Take 1 tablet (5 mg total) by mouth every evening.  Dispense: 90 tablet; Refill: 3    CNS vasculitis failed imuran; failed cytoxan; 1/2020 rituxan  -managed by Rheumatology and Neurology.  Wife notes that Cytoxan work better than Rituxan but Cytoxan too toxic.  On Ritalin for lethargy managed by Neurology.  Hopefully starting autoPAP will help.    Chemotherapy-induced thrombocytopenia  Anemia associated with chemotherapy  Thrombocytopenia, unspecified  -stable.  Monitor.    Controlled type 2 diabetes mellitus with diabetic nephropathy, with long-term current use of insulin  Type 2 diabetes mellitus with diabetic polyneuropathy, with long-term current use of insulin  -A1c was good.  Diarrhea is better off of metformin.  Stay on Basaglar 25 b.i.d..  NovoLog 10 t.i.d..  And Victoza daily.  Takes higher doses of Basaglar and NovoLog with chemotherapy because of steroid administration.  -     insulin (BASAGLAR KWIKPEN U-100 INSULIN) glargine 100 units/mL (3mL) SubQ pen; Inject 25 Units into the skin 2 (two) times a day. Up to 40 BID with rituxan  Dispense: 2 Box; Refill: 11  -     liraglutide 0.6 mg/0.1 mL, 18 mg/3 mL, subq PNIJ (VICTOZA 3-TOMASZ) 0.6 mg/0.1 mL (18 mg/3 mL) PnIj pen; Inject 1.8 mg into the skin once daily.   Dispense: 9 mL; Refill: 11  -     insulin aspart U-100 (NOVOLOG) 100 unit/mL (3 mL) InPn pen; Inject 10 Units into the skin before meals and at bedtime as needed (Hyperglycemia). Up to 20 BID with chemo  Dispense: 2 Box; Refill: 3    Mixed hyperlipidemia  -labs done during hospital admission giselle on Lipitor 40 no change.  -     atorvastatin (LIPITOR) 40 MG tablet; Take 1 tablet (40 mg total) by mouth once daily.  Dispense: 90 tablet; Refill: 3    Essential hypertension 03/09/2020 TTE concentric LV remodeling.  Normal LV systolic function LVEF 55%.  Grade 2 diastolic dysfunction.  Normal CVP.  PA pressure 20.  -BP is good.  Stay on atenolol, diltiazem, irbesartan, refilled to pharmacy  -     diltiaZEM (DILT-XR) 240 MG CDCR; Take 1 capsule (240 mg total) by mouth once daily.  Dispense: 90 capsule; Refill: 3  -     atenoloL (TENORMIN) 50 MG tablet; Take 1 tablet (50 mg total) by mouth once daily.  Dispense: 90 tablet; Refill: 3  -     irbesartan (AVAPRO) 300 MG tablet; Take 1 tablet (300 mg total) by mouth every evening.  Dispense: 90 tablet; Refill: 3    Urinary incontinence due to cognitive impairment  -wife notes that this is labor intensive.  Trial of Ditropan.  Side effect profile discussed.  -     oxybutynin (DITROPAN-XL) 5 MG TR24; Take 1 tablet (5 mg total) by mouth once daily.  Dispense: 30 tablet; Refill: 11    Closed compression fracture of fourth lumbar vertebra, sequela  Other osteoporosis with current pathological fracture with routine healing, subsequent encounter  -refilled Fosamax.  -     alendronate (FOSAMAX) 70 MG tablet; Take 1 tablet (70 mg total) by mouth every 7 days.  Dispense: 4 tablet; Refill: 11    Major depressive disorder with single episode, in full remission  -stable on Zoloft 150  -     sertraline (ZOLOFT) 100 MG tablet; 1.5 tablet daily  Dispense: 135 tablet; Refill: 3     JOHN with CPAP at night  -had previously been diagnosed with sleep apnea but not using CPAP.  Repeat sleep study  confirms severe sleep apnea.  I sent in a prescription for autoPAP.  -     CPAP FOR HOME USE      Medications Discontinued During This Encounter   Medication Reason    metFORMIN (GLUCOPHAGE-XR) 500 MG XR 24hr tablet Side effects    atorvastatin (LIPITOR) 40 MG tablet Reorder    irbesartan (AVAPRO) 300 MG tablet Reorder    insulin (BASAGLAR KWIKPEN U-100 INSULIN) glargine 100 units/mL (3mL) SubQ pen Reorder    insulin aspart U-100 (NOVOLOG) 100 unit/mL (3 mL) InPn pen Reorder    donepezil (ARICEPT) 5 MG tablet Reorder    liraglutide 0.6 mg/0.1 mL, 18 mg/3 mL, subq PNIJ (VICTOZA 3-TOMASZ) 0.6 mg/0.1 mL (18 mg/3 mL) PnIj Reorder    sertraline (ZOLOFT) 100 MG tablet Reorder    diltiaZEM (DILT-XR) 240 MG CDCR Reorder    alendronate (FOSAMAX) 70 MG tablet Reorder    atenolol (TENORMIN) 50 MG tablet Reorder       meds sent this encounter:  Medications Ordered This Encounter   Medications    alendronate (FOSAMAX) 70 MG tablet     Sig: Take 1 tablet (70 mg total) by mouth every 7 days.     Dispense:  4 tablet     Refill:  11    atenoloL (TENORMIN) 50 MG tablet     Sig: Take 1 tablet (50 mg total) by mouth once daily.     Dispense:  90 tablet     Refill:  3     .    atorvastatin (LIPITOR) 40 MG tablet     Sig: Take 1 tablet (40 mg total) by mouth once daily.     Dispense:  90 tablet     Refill:  3    diltiaZEM (DILT-XR) 240 MG CDCR     Sig: Take 1 capsule (240 mg total) by mouth once daily.     Dispense:  90 capsule     Refill:  3    donepeziL (ARICEPT) 5 MG tablet     Sig: Take 1 tablet (5 mg total) by mouth every evening.     Dispense:  90 tablet     Refill:  3    insulin (BASAGLAR KWIKPEN U-100 INSULIN) glargine 100 units/mL (3mL) SubQ pen     Sig: Inject 25 Units into the skin 2 (two) times a day. Up to 40 BID with rituxan     Dispense:  2 Box     Refill:  11    insulin aspart U-100 (NOVOLOG) 100 unit/mL (3 mL) InPn pen     Sig: Inject 10 Units into the skin before meals and at bedtime as needed  (Hyperglycemia). Up to 20 BID with chemo     Dispense:  2 Box     Refill:  3    irbesartan (AVAPRO) 300 MG tablet     Sig: Take 1 tablet (300 mg total) by mouth every evening.     Dispense:  90 tablet     Refill:  3     .    liraglutide 0.6 mg/0.1 mL, 18 mg/3 mL, subq PNIJ (VICTOZA 3-TOMASZ) 0.6 mg/0.1 mL (18 mg/3 mL) PnIj pen     Sig: Inject 1.8 mg into the skin once daily.     Dispense:  9 mL     Refill:  11    oxybutynin (DITROPAN-XL) 5 MG TR24     Sig: Take 1 tablet (5 mg total) by mouth once daily.     Dispense:  30 tablet     Refill:  11    sertraline (ZOLOFT) 100 MG tablet     Si.5 tablet daily     Dispense:  135 tablet     Refill:  3     Please consider 90 day supplies to promote better adherence       Follow Up: No follow-ups on file.  Follow-up 3 months.  At that time follow-up on new start Ditropan.  And on new start autoPAP.    Subjective:     Chief Complaint   Patient presents with    Hospital Follow Up       HPI  Bessy is a 60 y.o. male, last appointment with this clinic was Visit date not found.    Family and/or Caretaker present at visit?  Yes.  Diagnostic tests reviewed/disposition: No diagnosic tests pending after this hospitalization.  Disease/illness education: none  Home health/community services discussion/referrals: Patient has home health established at [facility].   Establishment or re-establishment of referral orders for community resources: No other necessary community resources.   Discussion with other health care providers: No discussion with other health care providers necessary.     Last OV with me 2020  DM at [place] ttime no change, basaglar 25, novllog 10  HTN stable  Osteoporosis, on bisphosphonate  Depression, zoloft    I had ordered hospital bed fully electric.  Due to falls and difficulty getting into bed. Was denied by his insurance.    3/9 hospitalization for dehydration/MANISH/trop bump, TTE WNL    Saw rheum mid May for f/u CNS vasculitis.  Previously changed from  cytoxan to rituxan.  Ritalin added for chronic fatigue.    ER 5/25 for fever, felt to be viral. covid19 negative.    Saw podiatry for R great toe ulcer  7/6/2020 underwent sleep study showing severe JOHN  Given autopap    Hospitalization 7/30 through 8/3 for R thalamic infarct  Hospital Course (synopsis of major diagnoses, care, treatment, and services provided during the course of the hospital stay): 7/31: Oriented x 3 today. Mild dysarthria. ASA 81 mg, Lipitor 40 mg. Echo completed. Etiology for neurologic findings believed to be more likely secondary to small vessel disease causing CVA rather than CNS vasculitis however if the patient were to experience similar events in the future it could still be considered. Therapy recommended HH for ongoing therapy. Patient without any further new or worsening symptoms. He is going home with home health on 8/3. Restarted home diltiazem on discharge. Discharging with walker and HH. Discharging with another 21 days of joint DAPT with plavix and ASA followed by ASA monotherapy. Follow up in stroke clinic in 4-6 weeks    basaglar 25 BID  novolog 10 with meals  With treatments (gets steroids) - basaglar 40 BID   novolog 20 and SSI.  X about 2 days.     victoza 1.8 daily.    seroquel PRN.  Ritalin daily. No issues with insomnia.  zoloft 150 mg.     7/30 a1c 7.2  7/30 lipid good    Off of metformin - diarrhea improved.     But wife noticing urinary and fecal incontinence.  She sets an alarm to check him every 2 hours.     Ankle edema; that is new.  However on today's exam he exhibits no edema.    rituxan not as good as the cytoxan.  Not as helpful clearing the brain. And gives him headache.    Sleep study showed sleep apnea.  I need to start him on autoPAP.  She is hopeful that this will help with his overall lethargy.    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    Fall 01/03/2020    Abrasion 01/03/2020    Stress 11/04/2019    Diarrhea 07/18/2019    Anemia associated with chemotherapy 07/18/2019    Chemotherapy-induced thrombocytopenia 07/18/2019    MANISH (acute kidney injury) hospitalization 03/2020 responded to IV fluids 06/21/2019    Dementia with behavioral disturbance 04/30/2019    Osteoporosis with current pathological fracture with routine healing from steroids; compression fx 2017; 4/2019 bisphosphonate 04/10/2019    Duodenal ulcer 6/2017 EGD - duodenum and gastric ulcer s/p cautery 03/28/2019 6/2017 EGD showing duodenal and gastric ulcer which were treated with cautery.      Renal cyst bilateral simple and minimally, complicated renal cysts. 03/28/2019 1/7/2020 renal US: Two stable simple to minimally complex right renal cysts.     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    Central scotoma, right eye 02/06/2019    Encounter for chemotherapy management 12/07/2018    Need for Tdap vaccination 08/29/2018    Need for hepatitis A and B vaccination 08/29/2018    Need for pneumococcal vaccination 08/29/2018    Acquired immunocompromised state 08/29/2018     cyclophosphamide      Controlled type 2 diabetes mellitus with diabetic nephropathy, with long-term current use of insulin 08/01/2018    Chemotherapy induced nausea and vomiting 06/12/2018    Dysphasia     Closed compression fracture of L4 lumbar vertebra on MRI 11/2017 11/15/2017     11/2017 MRI L spine: Acute burst type fracture of the L4 vertebra with fracture line extending to the posterior cortex of the L4  vertebra. There is loss of central vertebral body height of the superior end plate of approximately 40%.  IMO Regulatory Load October 2019      Adverse effect of glucocorticoids and synthetic analogues, initial encounter 09/15/2017    History of stroke 9/2017 L thalamus; 7/2020 R thalamus; felt to be due to small vessel disease, not vasculitis 09/14/2017 9/14/2017 MRI brain: 1. Findings compatible with a small acute lacunar infarct adjacent to the left frontal horn.  Nonspecific enhancement just inferior to this region and extending into the left basal ganglia, as well as within the inferior aspect of the left cerebellum.  No evidence of a focal mass.  2.  Extensive increased signal intensity involving the periventricular white matter compatible with demyelinating disease versus vasculitis.  3.  Clinical correlation and followup recommended.  4.  This reportedly flattened in the EPIC and the corpus callosum medical record system.  12/4/19 MRI brain with/without: Chronic ischemic change as further detailed above, similar to MRI of 04/15/2019.  Multiple small foci of prior hemorrhage in the cerebellum and jose, possible sequela from hypertension.  No evidence of recent infarction or other acute intracranial pathology.    7/30/2020 MRI brain: Subcentimeter focus of diffusion restriction right parietal periventricular white matter concerning for acute/recent infarction.  In light of history is may be sequela of underlying vasculitis with superimposed severe patchy and confluent T2 FLAIR signal abnormality supratentorial white matter and jose.  Small remote left basal ganglia and bilateral thalamic remote lacunar-type infarct with additional remote inferior cerebellar infarcts with encephalomalacia.  Punctate remote microhemorrhages cerebellar hemispheres and brainstem.    Etiology for neurologic findings believed to be more likely secondary to small vessel disease causing CVA rather than CNS vasculitis however if  the patient were to experience similar events in the future it could still be considered. Therapy recommended HH for ongoing therapy. Patient without any further new or worsening symptoms. He is going home with home health on 8/3. Restarted home diltiazem on discharge. Discharging with walker and HH. Discharging with another 21 days of joint DAPT with plavix and ASA followed by ASA monotherapy. Follow up in stroke clinic in 4-6 weeks. All information relayed to patient and family prior to discharge.       Episodic confusion      Improved now that back on cyclophosphamide      Cytopenia 08/30/2017    Impaired functional mobility, balance, gait, and endurance 06/14/2017    Urinary incontinence 06/04/2017    CNS vasculitis failed imuran; failed cytoxan; 1/2020 rituxan 06/02/2017     Failed Cytoxan hiatus and transition to Imuran Sept/Oct 2018; restarted on cytoxan  12/4/19 MRI brain with/without: Chronic ischemic change as further detailed above, similar to MRI of 04/15/2019.  Multiple small foci of prior hemorrhage in the cerebellum and jose, possible sequela from hypertension.  No evidence of recent infarction or other acute intracranial pathology.      Encephalopathy 05/26/2017    Type 2 diabetes mellitus with diabetic polyneuropathy, with long-term current use of insulin 05/14/2017    Amblyopia 05/13/2017    Tubular adenoma of colon 2014, 5/2019; 7/2020 C scope mult polyps, path not included; repeat 3 years; Ketan 01/30/2015 2/21/2014 colonoscopy showing mild nonspecific acute and chronic inflammation of the ascending colon.  Hyperplastic polyp. Repeat 3 years.  5/21/2019 colonscopy 8 ascending 4 descending polyps path 2 tubular adenomas, rest are inflammatory polyps; repeat 1 year  7/20/20 C scope 3 polyps sigmoid 4 polyps transverse, several > 10 mm; path pending; repeat 3 years      Lipodystrophy 10/11/2013    NAFLD (nonalcoholic fatty liver disease) 6/12/2015 liver biopsy showing advanced  stage fibrosis with bridging fibrosis and scattered nodules. 10/11/2013     6/12/2015 liver biopsy showing advanced stage fibrosis with bridging fibrosis and scattered nodules.      Obesity (BMI 30-39.9) 10/11/2013    ED (erectile dysfunction) 10/11/2013     JOHN with CPAP at night 10/11/2013     07/30/2020 2 night at home sleep study very severe sleep apnea AHI 54      Episode of recurrent major depressive disorder 11/09/2012    Mixed hyperlipidemia 11/09/2012    Essential hypertension 03/09/2020 TTE concentric LV remodeling.  Normal LV systolic function LVEF 55%.  Grade 2 diastolic dysfunction.  Normal CVP.  PA pressure 20. 11/09/2012 5/11/2016 TTE:   1 - Mildly depressed left ventricular systolic function (EF 45-50%).  Mild global hypokinesis at rest. 2 - Eccentric hypertrophy. 3 - Left ventricular diastolic dysfunction.  3/2020 hospitalization for dehydration with mild elevated troponin.  Echo normal.  03/09/2020 TTE concentric LV remodeling.  Normal LV systolic function LVEF 55%.  Grade 2 diastolic dysfunction.  Normal CVP.  PA pressure 20.         PAST MEDICAL HISTORY:  Past Medical History:   Diagnosis Date    Amblyopia     Cataract     CNS vasculitis 6/2/2017    Follows with Dr. Love By mri.  Several active lesions, many old. 5/13: MRA brain/neck, MRI brain w/wo contrast show no vascular occlusion, multiple foci of hyperintensity in deep cerebral periventricular white matter in pattern of demyelinating process.  5/17: MRI spine performed, no spinal cord lesions. Hospitalization 6/2017. EEG was performed negative for seizures.  Repeat MRI showing new lesion, question whether this was demyelination versus CVA. 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 Findings compatible with a  small acute lacunar infarct adjacent to the left frontal horn.  Nonspecific enhancement just inferior to this region and extending into the left basal ganglia, as well as within the inferior aspect of the left cerebellum.  No evidence of a focal mass. 2.  Extensive increased signal intensity involving the periventricular white matter compatible with demyelinating disease versus vasculitis. 3.  Clinical correlation and followup recommended. 4.  This reportedly flattened in the EPIC and the corpus callosum medical record system.    Mixed hyperlipidemia 11/9/2012    NAFLD (nonalcoholic fatty liver disease) 10/11/2013    CHASE (nonalcoholic steatohepatitis)     Obesity     Stroke     Type 2 diabetes mellitus with diabetic polyneuropathy, with long-term current use of insulin 5/14/2017    Type 2 diabetes mellitus with hyperglycemia, with long-term current use of insulin 10/11/2013       PAST SURGICAL HISTORY:  Past Surgical History:   Procedure Laterality Date    COLONOSCOPY N/A 5/21/2019    Procedure: COLONOSCOPY;  Surgeon: Alex Ascencio MD;  Location: [facility];  Service: Endoscopy;  Laterality: N/A;  confirmed appt-sp    INSERTION OF TUNNELED CENTRAL VENOUS CATHETER (CVC) WITH SUBCUTANEOUS PORT N/A 12/7/2018    Procedure: [id number];  Surgeon: Luan Diagnostic Provider;  Location: [facility] OR [address] [place] [address];  Service: Radiology;  Laterality: N/A;    SKIN CANCER EXCISION         SOCIAL HISTORY:  Social History     Socioeconomic History    Marital status:      Spouse name: Not on file    Number of children: Not on file    Years of education: Not on file    Highest education level: Not on file   Occupational History    Occupation: unemployed   Social Needs    Financial resource strain: Very hard    Food insecurity     Worry: Never true     Inability: Never true    Transportation needs     Medical: No     Non-medical: No   Tobacco Use    Smoking status: Never Smoker    Smokeless tobacco: Never  Used   Substance and Sexual Activity    Alcohol use: Not Currently     Alcohol/week: 0.0 standard drinks     Frequency: Never     Drinks per session: 1 or 2     Binge frequency: Never    Drug use: No    Sexual activity: Yes     Partners: Female   Lifestyle    Physical activity     Days per week: 0 days     Minutes per session: 0 min    Stress: Only a little   Relationships    Social connections     Talks on phone: More than three times a week     Gets together: Once a week     Attends [employer] service: Not on file     Active member of club or organization: No     Attends meetings of clubs or organizations: Never     Relationship status:    Other Topics Concern    Not on file   Social History Narrative    Not on file       ALLERGIES AND MEDICATIONS: updated and reviewed.  Review of patient's allergies indicates:  No Known Allergies    Medication List with Changes/Refills   Current Medications    ALENDRONATE (FOSAMAX) 70 MG TABLET    Take 1 tablet (70 mg total) by mouth every 7 days.    ASPIRIN (ECOTRIN) 81 MG EC TABLET    Take 81 mg by mouth once daily.    ATENOLOL (TENORMIN) 50 MG TABLET    Take 1 tablet (50 mg total) by mouth once daily.    ATORVASTATIN (LIPITOR) 40 MG TABLET    TAKE 1 TABLET BY MOUTH ONCE DAILY    BLOOD SUGAR DIAGNOSTIC (TRUE METRIX GLUCOSE TEST STRIP) STRP    Test blood sugar four times a day    CLOPIDOGREL (PLAVIX) 75 MG TABLET    Take 1 tablet (75 mg total) by mouth once daily.    DILTIAZEM (DILT-XR) 240 MG CDCR    Take 1 capsule (240 mg total) by mouth once daily.    DONEPEZIL (ARICEPT) 5 MG TABLET    Take 1 tablet (5 mg total) by mouth every evening.    FREESTYLE ARELY 14 DAY READER [facility]    GLUCOSE TESTING : FASTING AND PRIOR TO MEALS    FREESTYLE ARELY 14 DAY SENSOR KIT    GLUCOSE TESTING 4 TIMES A DAY    INSULIN (BASAGLAR KWIKPEN U-100 INSULIN) GLARGINE 100 UNITS/ML (3ML) SUBQ PEN    Inject 25 Units into the skin once daily. Up to 60 BID with cytoxan    INSULIN ASPART U-100  "(NOVOLOG) 100 UNIT/ML (3 ML) INPN PEN    Inject 1-10 Units into the skin before meals and at bedtime as needed (Hyperglycemia).    IRBESARTAN (AVAPRO) 300 MG TABLET    TAKE 1 TABLET BY MOUTH ONCE DAILY IN THE EVENING    LANCETS 30 GAUGE MISC    Test blood sugar four times a day    LIRAGLUTIDE 0.6 MG/0.1 ML, 18 MG/3 ML, SUBQ PNIJ (VICTOZA 3-TOMASZ) 0.6 MG/0.1 ML (18 MG/3 ML) PNIJ    Inject 1.8 mg into the skin once daily.    METFORMIN (GLUCOPHAGE-XR) 500 MG XR 24HR TABLET    TAKE 2 TABLETS BY MOUTH TWICE DAILY WITH MEALS    METHYLPHENIDATE HCL (RITALIN) 10 MG TABLET    Take 1 tablet (10 mg total) by mouth 2 (two) times daily with meals.    METHYLPHENIDATE HCL (RITALIN) 10 MG TABLET    Take 1 tablet (10 mg total) by mouth 2 (two) times daily with meals.    METHYLPHENIDATE HCL (RITALIN) 10 MG TABLET    Take 1 tablet (10 mg total) by mouth 2 (two) times daily with meals.    OMEGA-3 FATTY ACIDS-VITAMIN E (FISH OIL) 1,000 MG CAP    Take 1 capsule by mouth 2 (two) times daily.    PEN NEEDLE, DIABETIC 32 GAUGE X 5/32" NDLE    USE 1 PEN NEEDLE 4 TIMES DAILY (  SINGLE  USE)    QUETIAPINE (SEROQUEL) 25 MG TAB    Take 1 tablet (25 mg total) by mouth every evening.    SERTRALINE (ZOLOFT) 100 MG TABLET    1.5 tablet daily    SUPREP BOWEL PREP KIT 17.5-3.13-1.6 GRAM SOLR    USE AS DIRECTED    VITAMIN D 1000 UNITS TAB    Take 5 tablets (5,000 Units total) by mouth once daily.        Review of Systems   Constitutional: Negative for chills and fever.   Respiratory: Negative for shortness of breath.    Cardiovascular: Negative for chest pain.   All other systems reviewed and are negative.      Objective:   Physical Exam   Vitals:    08/05/20 1408   BP: 112/60   BP Location: Left arm   Patient Position: Sitting   BP Method: Medium (Manual)   Pulse: 64   Temp: 97.7 °F (36.5 °C)   TempSrc: Oral   SpO2: 96%   Height: 5' 10" (1.778 m)    Body mass index is 31.42 kg/m².      Height: 5' 10" (177.8 cm)     Physical Exam  Constitutional:       " General: He is not in acute distress.     Appearance: He is well-developed.   Cardiovascular:      Rate and Rhythm: Normal rate and regular rhythm.      Heart sounds: Normal heart sounds. No murmur.   Pulmonary:      Effort: Pulmonary effort is normal.      Breath sounds: Normal breath sounds.   Musculoskeletal: Normal range of motion.      Right lower leg: No edema.      Left lower leg: No edema.   Skin:     General: Skin is warm and dry.   Neurological:      Mental Status: He is alert.      Coordination: Coordination normal.      Comments: Cranial nerves 2-12 intact grossly.   5/5 bilaterally  Patellar DTR absent bilaterally  Leg extension 5/5 against resistance  Pronator drift negative.  Distractible and does not always follow commands  Alert and responds appropriately to most questions   Psychiatric:         Behavior: Behavior normal.         Thought Content: Thought content normal.

## 2020-08-06 ENCOUNTER — TELEPHONE (OUTPATIENT)
Dept: FAMILY MEDICINE | Facility: CLINIC | Age: 60
End: 2020-08-06

## 2020-08-06 PROCEDURE — 99499 NO LOS: ICD-10-PCS | Mod: ,,, | Performed by: INTERNAL MEDICINE

## 2020-08-06 PROCEDURE — G0180 MD CERTIFICATION HHA PATIENT: HCPCS | Mod: ,,, | Performed by: INTERNAL MEDICINE

## 2020-08-06 PROCEDURE — 99499 UNLISTED E&M SERVICE: CPT | Mod: ,,, | Performed by: INTERNAL MEDICINE

## 2020-08-06 PROCEDURE — G0180 PR HOME HEALTH MD CERTIFICATION: ICD-10-PCS | Mod: ,,, | Performed by: INTERNAL MEDICINE

## 2020-08-12 DIAGNOSIS — Z79.4 CONTROLLED TYPE 2 DIABETES MELLITUS WITH DIABETIC NEPHROPATHY, WITH LONG-TERM CURRENT USE OF INSULIN: ICD-10-CM

## 2020-08-12 DIAGNOSIS — E11.21 CONTROLLED TYPE 2 DIABETES MELLITUS WITH DIABETIC NEPHROPATHY, WITH LONG-TERM CURRENT USE OF INSULIN: ICD-10-CM

## 2020-08-12 RX ORDER — INSULIN GLARGINE 100 [IU]/ML
25 INJECTION, SOLUTION SUBCUTANEOUS 2 TIMES DAILY
Qty: 2 BOX | Refills: 11 | Status: SHIPPED | OUTPATIENT
Start: 2020-08-12 | End: 2020-08-14 | Stop reason: CLARIF

## 2020-08-12 RX ORDER — INSULIN ASPART 100 [IU]/ML
10 INJECTION, SOLUTION INTRAVENOUS; SUBCUTANEOUS
Qty: 2 BOX | Refills: 11 | Status: SHIPPED | OUTPATIENT
Start: 2020-08-12 | End: 2020-11-05 | Stop reason: SDUPTHER

## 2020-08-12 NOTE — TELEPHONE ENCOUNTER
----- Message from Rahel Velasco sent at 8/12/2020  8:57 AM CDT -----  Regarding: pt  Type: RX Refill Request    Who Called: pt    Have you contacted your pharmacy:    Refill or New Rx:insulin aspart U-100 (NOVOLOG) 100 unit/mL (3 mL) InPn pen - needs pa    insulin (BASAGLAR KWIKPEN U-100 INSULIN) glargine 100 units/mL (3mL) SubQ pen - needs pa    RX Name and Strength:    How is the patient currently taking it? (ex. 1XDay):    Is this a 30 day or 90 day RX:    Preferred Pharmacy with phone number:  [facility] [address] [place] [phone] [facility] [phone] [facility] [address]  [facility] [address]  Phone: [phone] Fax: [phone]        Local or Mail Order:    Ordering Provider:    Would the patient rather a call back or a response via [facility]? call    Best Call Back Number:[phone] ([place])       Additional Information:

## 2020-08-13 ENCOUNTER — TELEPHONE (OUTPATIENT)
Dept: FAMILY MEDICINE | Facility: CLINIC | Age: 60
End: 2020-08-13

## 2020-08-13 DIAGNOSIS — Z79.4 TYPE 2 DIABETES MELLITUS WITH DIABETIC POLYNEUROPATHY, WITH LONG-TERM CURRENT USE OF INSULIN: Primary | Chronic | ICD-10-CM

## 2020-08-13 DIAGNOSIS — E11.42 TYPE 2 DIABETES MELLITUS WITH DIABETIC POLYNEUROPATHY, WITH LONG-TERM CURRENT USE OF INSULIN: Primary | Chronic | ICD-10-CM

## 2020-08-13 NOTE — TELEPHONE ENCOUNTER
----- Message from Johanna Mcclelland sent at 8/13/2020 11:35 AM CDT -----  Regarding: patient care  Name of Who is Calling: Beti moise [facility]       What is the request in detail: Beti states patient has a fever of 99.9,weak and drowsy and a dry rash on his left cheek she is requesting a call back to discuss       Can the clinic reply by [facility]: no      What Number to Call Back if not in [facility]: [phone]

## 2020-08-13 NOTE — TELEPHONE ENCOUNTER
Beti return call and said that patient is weaker, fever 99.9, more lethargic them yesterday. They did stat and he is at 98%; lungs clear. Noted rash to cheek 2 days ago. Informed to provider and he advise urgent care for evaluation. Home health nurse Beti was informed of provider request and said that she will inform patient.

## 2020-08-14 ENCOUNTER — PATIENT MESSAGE (OUTPATIENT)
Dept: FAMILY MEDICINE | Facility: CLINIC | Age: 60
End: 2020-08-14

## 2020-08-14 RX ORDER — INSULIN GLARGINE 100 [IU]/ML
25 INJECTION, SOLUTION SUBCUTANEOUS 2 TIMES DAILY
Qty: 2 BOX | Refills: 11 | Status: SHIPPED | OUTPATIENT
Start: 2020-08-14 | End: 2020-11-05 | Stop reason: SDUPTHER

## 2020-08-16 NOTE — PLAN OF CARE
Problem: Adult Inpatient Plan of Care  Goal: Plan of Care Review  Outcome: Ongoing (interventions implemented as appropriate)  Pt here for mesna,cytoxan infusion , labs, hx, meds, allergies reviewed, pt with no complaints at this time, reclined in chair, continue to monitor      
Problem: Adult Inpatient Plan of Care  Goal: Plan of Care Review  Outcome: Ongoing (interventions implemented as appropriate)  Pt received treatment, tolerated well. Plan of care reviewed and Pt instructed to contact MD with any further concerns or questions. Pt discharged @ 17:28      
79.4

## 2020-08-19 ENCOUNTER — PATIENT OUTREACH (OUTPATIENT)
Dept: ADMINISTRATIVE | Facility: OTHER | Age: 60
End: 2020-08-19

## 2020-08-19 DIAGNOSIS — Z12.11 SCREENING FOR COLON CANCER: Primary | ICD-10-CM

## 2020-08-20 ENCOUNTER — EXTERNAL HOME HEALTH (OUTPATIENT)
Dept: HOME HEALTH SERVICES | Facility: HOSPITAL | Age: 60
End: 2020-08-20
Payer: MEDICARE

## 2020-08-20 ENCOUNTER — DOCUMENT SCAN (OUTPATIENT)
Dept: HOME HEALTH SERVICES | Facility: HOSPITAL | Age: 60
End: 2020-08-20
Payer: MEDICARE

## 2020-08-24 ENCOUNTER — DOCUMENTATION ONLY (OUTPATIENT)
Dept: NEUROLOGY | Facility: CLINIC | Age: 60
End: 2020-08-24

## 2020-08-27 ENCOUNTER — DOCUMENTATION ONLY (OUTPATIENT)
Dept: NEUROLOGY | Facility: CLINIC | Age: 60
End: 2020-08-27

## 2020-09-01 ENCOUNTER — DOCUMENT SCAN (OUTPATIENT)
Dept: HOME HEALTH SERVICES | Facility: HOSPITAL | Age: 60
End: 2020-09-01
Payer: MEDICARE

## 2020-09-03 ENCOUNTER — DOCUMENT SCAN (OUTPATIENT)
Dept: HOME HEALTH SERVICES | Facility: HOSPITAL | Age: 60
End: 2020-09-03
Payer: MEDICARE

## 2020-09-09 ENCOUNTER — OFFICE VISIT (OUTPATIENT)
Dept: NEUROLOGY | Facility: CLINIC | Age: 60
End: 2020-09-09
Payer: MEDICARE

## 2020-09-09 DIAGNOSIS — I63.511 RIGHT MIDDLE CEREBRAL ARTERY STROKE: ICD-10-CM

## 2020-09-09 PROCEDURE — 99204 PR OFFICE/OUTPT VISIT, NEW, LEVL IV, 45-59 MIN: ICD-10-PCS | Mod: 95,,, | Performed by: NURSE PRACTITIONER

## 2020-09-09 PROCEDURE — 99204 OFFICE O/P NEW MOD 45 MIN: CPT | Mod: 95,,, | Performed by: NURSE PRACTITIONER

## 2020-09-09 NOTE — ASSESSMENT & PLAN NOTE
Patient with known CNS vasculitis and seizures on cytoxan up until October, been off since then with plans to transition to another immunosuppressive. P/w subacute encephalopathy, urinary incontinence and fever, found to have urosepsis. Most likely underlying etiology for encephalopathy but still would rule out worsening of vasculitis, new infarcts, and subclinical seizures.    - recommended MRI of brain which was unremarkable  - recommended routine EEG to rule out NCSE  - continue to treat UTI with antibiotics       yes...

## 2020-09-09 NOTE — PROGRESS NOTES
SUBJECTIVE:  The patient location is: home  The chief complaint leading to consultation is: Follow up for stroke  Visit type: audiovisual  Each patient to whom he or she provides medical services by telemedicine is:  (1) informed of the relationship between the provider and patient and the respective role of any other health care provider with respect to management of the patient; and (2) notified that he or she may decline to receive medical services by telemedicine and may withdraw from such care at any time  Total time spent with patient: 35 minutes    History for Present Illness: Bessy Nunez is a 60 y.o.  male  with PMHx of CNS vasculitis, HTN, HLD and DM  who presents to me via telemedicine today for follow-up after inpatient hospitalization on 07/30/20  for right sided weakness , slurred speech and confusion. He is accompanied to today's visit by his wife.  Bessy Nunez has been recovering well since his discharge. His only residual symptoms include ongoing memory issues, mild dysarthria and gait instability . He denies  dizziness, numbness, diff with word finding, or visual deficits.  Nl balance. Patient ambulates with rolling walker. We discussed Bessy Nunez imaging findings and the etiology of his stroke. The patient has no new concerns at this time.      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 Love By mri.  Several active lesions, many old. 5/13: MRA brain/neck, MRI brain w/wo contrast show no vascular occlusion, multiple foci of hyperintensity in deep cerebral periventricular white matter in pattern of demyelinating process.  5/17: MRI spine performed, no spinal cord lesions. Hospitalization 6/2017. EEG was performed negative for seizures.  Repeat MRI showing new lesion, question whether this was demyelination versus CVA. 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 Findings compatible with a small acute lacunar infarct adjacent to the left frontal horn.  Nonspecific enhancement just inferior to this region and extending into the left basal ganglia, as well as within the inferior aspect of the left cerebellum.  No evidence of a focal mass. 2.  Extensive increased signal intensity involving the periventricular white matter compatible with demyelinating disease versus vasculitis. 3.  Clinical correlation and followup recommended. 4.  This reportedly flattened in the EPIC and the corpus callosum medical record system.    Mixed hyperlipidemia 11/9/2012    NAFLD (nonalcoholic fatty liver disease) 10/11/2013    CHASE (nonalcoholic steatohepatitis)     Obesity     Stroke     Type 2 diabetes mellitus with diabetic polyneuropathy, with long-term current use of insulin 5/14/2017    Type 2 diabetes mellitus with hyperglycemia, with long-term current use of insulin 10/11/2013     Past Surgical History:   Procedure Laterality Date    COLONOSCOPY N/A 5/21/2019    Procedure: COLONOSCOPY;  Surgeon: Alex Ascencio MD;  Location: [facility];  Service: Endoscopy;  Laterality: N/A;  confirmed appt-sp    INSERTION OF TUNNELED CENTRAL VENOUS CATHETER (CVC) WITH SUBCUTANEOUS PORT N/A 12/7/2018    Procedure: [id number];  Surgeon: Luan Diagnostic Provider;  Location: [facility] OR [address] [place] [address];  Service: Radiology;  Laterality: N/A;    SKIN CANCER EXCISION       Family History   Problem Relation Age of Onset    Heart disease Mother     Hypertension Mother     Heart failure Mother     Cataracts Mother     Cancer Father         lung    Diabetes Maternal Aunt     Stroke Sister     Rheum arthritis Paternal Grandmother     Amblyopia Neg Hx     Blindness Neg Hx     Glaucoma Neg Hx     Macular degeneration Neg Hx     Retinal  detachment Neg Hx     Strabismus Neg Hx       Social History     Socioeconomic History    Marital status:      Spouse name: Not on file    Number of children: Not on file    Years of education: Not on file    Highest education level: Not on file   Occupational History    Occupation: unemployed   Social Needs    Financial resource strain: Very hard    Food insecurity     Worry: Never true     Inability: Never true    Transportation needs     Medical: No     Non-medical: No   Tobacco Use    Smoking status: Never Smoker    Smokeless tobacco: Never Used   Substance and Sexual Activity    Alcohol use: Not Currently     Alcohol/week: 0.0 standard drinks     Frequency: Never     Drinks per session: 1 or 2     Binge frequency: Never    Drug use: No    Sexual activity: Yes     Partners: Female   Lifestyle    Physical activity     Days per week: 0 days     Minutes per session: 0 min    Stress: Only a little   Relationships    Social connections     Talks on phone: More than three times a week     Gets together: Once a week     Attends Caodaism service: Not on file     Active member of club or organization: No     Attends meetings of clubs or organizations: Never     Relationship status:    Other Topics Concern    Not on file   Social History Narrative    Not on file       Current Outpatient Medications:     alendronate (FOSAMAX) 70 MG tablet, Take 1 tablet (70 mg total) by mouth every 7 days., Disp: 4 tablet, Rfl: 11    aspirin (ECOTRIN) 81 MG EC tablet, Take 81 mg by mouth once daily., Disp: , Rfl:     atenoloL (TENORMIN) 50 MG tablet, Take 1 tablet (50 mg total) by mouth once daily., Disp: 90 tablet, Rfl: 3    atorvastatin (LIPITOR) 40 MG tablet, Take 1 tablet (40 mg total) by mouth once daily., Disp: 90 tablet, Rfl: 3    blood sugar diagnostic (TRUE METRIX GLUCOSE TEST STRIP) Strp, Test blood sugar four times a day, Disp: 400 each, Rfl: 11    clopidogreL (PLAVIX) 75 mg tablet, Take 1  "tablet (75 mg total) by mouth once daily., Disp: 21 tablet, Rfl: 0    diltiaZEM (DILT-XR) 240 MG CDCR, Take 1 capsule (240 mg total) by mouth once daily., Disp: 90 capsule, Rfl: 3    donepeziL (ARICEPT) 5 MG tablet, Take 1 tablet (5 mg total) by mouth every evening., Disp: 90 tablet, Rfl: 3    FREESTYLE ARELY 14 DAY READER Misc, GLUCOSE TESTING : FASTING AND PRIOR TO MEALS, Disp: 1 each, Rfl: 0    FREESTYLE ARELY 14 DAY SENSOR Kit, GLUCOSE TESTING 4 TIMES A DAY, Disp: 2 kit, Rfl: 11    insulin (LANTUS SOLOSTAR U-100 INSULIN) glargine 100 units/mL (3mL) SubQ pen, Inject 25 Units into the skin 2 (two) times a day. Up to 40 bid with chemotherapy, Disp: 2 Box, Rfl: 11    insulin aspart U-100 (NOVOLOG) 100 unit/mL (3 mL) InPn pen, Inject 10 Units into the skin before meals and at bedtime as needed (Hyperglycemia). Up to 20 BID with chemo, Disp: 2 Box, Rfl: 11    irbesartan (AVAPRO) 300 MG tablet, Take 1 tablet (300 mg total) by mouth every evening., Disp: 90 tablet, Rfl: 3    lancets 30 gauge Misc, Test blood sugar four times a day, Disp: 100 each, Rfl: 11    liraglutide 0.6 mg/0.1 mL, 18 mg/3 mL, subq PNIJ (VICTOZA 3-TOMASZ) 0.6 mg/0.1 mL (18 mg/3 mL) PnIj pen, Inject 1.8 mg into the skin once daily., Disp: 9 mL, Rfl: 11    methylphenidate HCl (RITALIN) 10 MG tablet, Take 1 tablet (10 mg total) by mouth 2 (two) times daily with meals., Disp: 60 tablet, Rfl: 0    [START ON 9/22/2020] methylphenidate HCl (RITALIN) 10 MG tablet, Take 1 tablet (10 mg total) by mouth 2 (two) times daily with meals. (Patient not taking: Reported on 8/5/2020), Disp: 60 tablet, Rfl: 0    omega-3 fatty acids-vitamin E (FISH OIL) 1,000 mg Cap, Take 1 capsule by mouth 2 (two) times daily., Disp: , Rfl: 0    oxybutynin (DITROPAN-XL) 5 MG TR24, Take 1 tablet (5 mg total) by mouth once daily., Disp: 30 tablet, Rfl: 11    pen needle, diabetic 32 gauge x 5/32" Ndle, USE 1 PEN NEEDLE 4 TIMES DAILY (  SINGLE  USE), Disp: 350 each, Rfl: 11    " QUEtiapine (SEROQUEL) 25 MG Tab, Take 1 tablet (25 mg total) by mouth every evening., Disp: 30 tablet, Rfl: 11    sertraline (ZOLOFT) 100 MG tablet, 1.5 tablet daily, Disp: 135 tablet, Rfl: 3    SUPREP BOWEL PREP KIT 17.5-3.13-1.6 gram SolR, USE AS DIRECTED, Disp: , Rfl:     vitamin D 1000 units Tab, Take 5 tablets (5,000 Units total) by mouth once daily., Disp: , Rfl:      Review of Systems:  Constitution: negative for lethargy and weakness; no recent changes in weight  Eyes: no eyesight worsening; no double vision; no eye pain  ENT/Mouth: no nasal congestion or nose bleeds; no sore throat or hoarseness  Cardiovascular:  no chest pain with exertion; no palpitations  Respiratory: Normal effort and rate; no coughing  Gastrointestinal: No N/V; negative abdominal pain; no changes in bowel habits  Genitourinary:  no increased frequency in voiding or incontinence  Musculoskeletal:  No joint pain or swelling; no myalgia   Skin:  negative for skin rash or lesions  Hematologic: negative for bruising  Neurologic: negative headache, dizziness, occasional confusion (baseline)    OBJECTIVE:    THE PATIENT WAS SEEN AS A VIDEO VISIT . PHYSICAL EXAM FINDINGS ARE AS VIEWED BY MYSELF VIA VIDEO .    Physical Exam   Constitution: He appears well nourished. No distress   LOC: Alert and follows request  Head: Normocephalic and atraumatic.   Pulmonary/Chest: Effort normal. No respiratory distress  Musculoskeletal:  No range of motion restrictions elsewhere about the body except that noted in the history of present illness.    Neurologic Exam:  Orientation: person, place and time  Language: No aphasia  Speech: mild dysarthria  Memory: Recent memory intact; Remote memory intact; age correct; month correct  Visual Fields (CN II):  Full  EOM (CN III, IV, VI): Full intact  Facial Movement (CN VII): Symmetrical facial expressions  Shoulder/Neck (CN XI): SCM-Left: Normal; SCM-Right: Normal; Left Shoulder Shrug: Normal/Symmetric ; Right  Shoulder Shrug: Normal/Symmetric  Tongue (CN XII): to midline  Cerebellar:  ALONSOs with slowing     NIH Stroke Scale:1  mRS Score:3  Diagnostic Results:  1.MRI brain 07/24/20 :Acute infarct in the right corona radiata.Chronic ischemic changes    2.TTE 7/31/2020:  · Technically challenging study  · Normal left ventricular systolic function. The estimated ejection fraction is 60%.  · Concentric left ventricular remodeling.  · Indeterminate left ventricular diastolic function.  · No wall motion abnormalities.  · Normal right ventricular systolic function.  · Mild left atrial enlargement.  Normal central venous pressure (3 mmHg).  Assessment:  Bessy Nunez  is a 60 y.o.  male with a history of CNS vasculitis, HTN, HLD and DM.  He  is seen today for follow-up assessment and recommendations. The following recommendations and plan were discussed in depth with  the patient who voiced understanding and was in agreement.  Plan:  1. Right MCA stroke- Probable Small Artery Occlusion   - Antithrombotics: continue DAPT with ASA 81 mg and Plavix 75 mg x 21 days then monotherapy with ASA for life  - Statins: continue atorvastatin 40 mg daily  - Aggressive risk factor modification: HTN, Diet, Exercise, Obesity, carotid stenosis  - Rehab efforts: none  - CNS vasculitis :  Continue to follow up with Dr Keating  -Neuropsych :   Based on wife's concerns regarding decline in memory and behavioral changes , patient may benefit from repeating formal cognitive   testing or follow up with memory clinic.   - Essential Hypertension :  stroke risk factor . Follow up with PCP for blood pressure management for CV risk reduction  -Type 2 diabetes mellitus : stroke risk factor;  Follow up with endocrinology for BS management for CV risk reduction       2. Patient/Family teaching  -A significant amount of time was spent reviewing the patient's stroke risk factors and the importance to modify them in order to reduce the risk of future events.  Also,  the importance of a healthy diet and daily exercise in order to reduce the risk of future strokes.     -We discussed the usual stroke warning signs and symptoms, and the need to activate EMS (call 911) as soon as the symptoms present.  - Sudden onset numbness or weakness of the face, arm, or leg;  especially on one side of the body  - Sudden confusion, trouble speaking, or understanding  - Sudden trouble seeing in one or both eyes  - Sudden trouble walking, dizziness, loss of coordination  - Sudden severe headache with no known cause    3.I will plan on having Bessy return to clinic as needed.  I have given the patient my contact information. He can contact my    office with any questions or concerns they may have as they arise in the interim.       Ilana Babcock, NP-C  Department of Vascular Neurology  [facility]  [phone]

## 2020-09-09 NOTE — LETTER
September 9, 2020      Eugenio Gunn MD  [address]  [facility] [address]           [facility]  [address]  [facility] [address]  Phone: [phone]          Patient: Bessy Nunez   MR Number: [id number]   YOB: 1960   Date of Visit: 9/9/2020       Dear Dr. Eugenio Gunn:    Thank you for referring Bessy Nunez to me for evaluation. Attached you will find relevant portions of my assessment and plan of care.    If you have questions, please do not hesitate to call me. I look forward to following Bessy Nunez along with you.    Sincerely,    Ilana Babcock, NP    Enclosure  CC:  No Recipients    If you would like to receive this communication electronically, please contact [email or web address] or [phone] to request more information on [employer] access.    For providers and/or their staff who would like to refer a patient to [facility], please contact us through our one-stop-shop provider referral line, [facility], at [phone].    If you feel you have received this communication in error or would no longer like to receive these types of communications, please e-mail [email or web address]

## 2020-09-11 ENCOUNTER — DOCUMENT SCAN (OUTPATIENT)
Dept: HOME HEALTH SERVICES | Facility: HOSPITAL | Age: 60
End: 2020-09-11
Payer: MEDICARE

## 2020-09-11 PROCEDURE — 99499 NO LOS: ICD-10-PCS | Mod: ,,, | Performed by: INTERNAL MEDICINE

## 2020-09-11 PROCEDURE — 99499 UNLISTED E&M SERVICE: CPT | Mod: ,,, | Performed by: INTERNAL MEDICINE

## 2020-09-13 ENCOUNTER — DOCUMENT SCAN (OUTPATIENT)
Dept: HOME HEALTH SERVICES | Facility: HOSPITAL | Age: 60
End: 2020-09-13
Payer: MEDICARE

## 2020-09-28 ENCOUNTER — TELEPHONE (OUTPATIENT)
Dept: NEUROLOGY | Facility: CLINIC | Age: 60
End: 2020-09-28

## 2020-10-11 ENCOUNTER — HOSPITAL ENCOUNTER (OUTPATIENT)
Facility: HOSPITAL | Age: 60
LOS: 1 days | Discharge: HOME | End: 2020-10-12
Attending: EMERGENCY MEDICINE | Admitting: INTERNAL MEDICINE
Payer: MEDICARE

## 2020-10-11 DIAGNOSIS — S09.90XA INJURY OF HEAD, INITIAL ENCOUNTER: ICD-10-CM

## 2020-10-11 DIAGNOSIS — N30.00 ACUTE CYSTITIS WITHOUT HEMATURIA: ICD-10-CM

## 2020-10-11 DIAGNOSIS — Z74.09 IMPAIRED FUNCTIONAL MOBILITY, BALANCE, GAIT, AND ENDURANCE: ICD-10-CM

## 2020-10-11 DIAGNOSIS — Z86.73 HISTORY OF STROKE: ICD-10-CM
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
## 2020-10-11 DIAGNOSIS — S22.32XA CLOSED FRACTURE OF ONE RIB OF LEFT SIDE, INITIAL ENCOUNTER: ICD-10-CM
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
## 2020-10-11 DIAGNOSIS — R55 SYNCOPE AND COLLAPSE: ICD-10-CM

## 2020-10-11 DIAGNOSIS — S22.32XD CLOSED FRACTURE OF ONE RIB OF LEFT SIDE WITH ROUTINE HEALING: ICD-10-CM

## 2020-10-11 DIAGNOSIS — N28.9 RENAL INSUFFICIENCY: ICD-10-CM

## 2020-10-11 DIAGNOSIS — R79.89 ELEVATED TROPONIN: ICD-10-CM

## 2020-10-11 PROBLEM — I67.4 HYPERTENSIVE ENCEPHALOPATHY: Status: ACTIVE | Noted: 2018-10-15

## 2020-10-11 PROBLEM — I67.4 HYPERTENSIVE ENCEPHALOPATHY: Status: RESOLVED | Noted: 2018-10-15 | Resolved: 2020-10-11

## 2020-10-11 LAB
ALBUMIN SERPL BCP-MCNC: 4.1 G/DL (ref 3.5–5.2)
ALP SERPL-CCNC: 89 U/L (ref 55–135)
ALT SERPL W/O P-5'-P-CCNC: 16 U/L (ref 10–44)
ANION GAP SERPL CALC-SCNC: 15 MMOL/L (ref 8–16)
APTT BLDCRRT: 27.2 SEC (ref 21–32)
AST SERPL-CCNC: 15 U/L (ref 10–40)
BACTERIA #/AREA URNS HPF: ABNORMAL /HPF
BASOPHILS # BLD AUTO: 0.03 K/UL (ref 0–0.2)
BASOPHILS NFR BLD: 0.3 % (ref 0–1.9)
BILIRUB SERPL-MCNC: 0.9 MG/DL (ref 0.1–1)
BILIRUB UR QL STRIP: NEGATIVE
BUN SERPL-MCNC: 31 MG/DL (ref 8–23)
CALCIUM SERPL-MCNC: 9.8 MG/DL (ref 8.7–10.5)
CHLORIDE SERPL-SCNC: 100 MMOL/L (ref 95–110)
CLARITY UR: ABNORMAL
CO2 SERPL-SCNC: 22 MMOL/L (ref 23–29)
COLOR UR: YELLOW
CREAT SERPL-MCNC: 2.8 MG/DL (ref 0.5–1.4)
DIFFERENTIAL METHOD: ABNORMAL
EOSINOPHIL # BLD AUTO: 0.1 K/UL (ref 0–0.5)
EOSINOPHIL NFR BLD: 0.8 % (ref 0–8)
ERYTHROCYTE [DISTWIDTH] IN BLOOD BY AUTOMATED COUNT: 14 % (ref 11.5–14.5)
EST. GFR  (AFRICAN AMERICAN): 27 ML/MIN/1.73 M^2
EST. GFR  (NON AFRICAN AMERICAN): 23 ML/MIN/1.73 M^2
GLUCOSE SERPL-MCNC: 246 MG/DL (ref 70–110)
GLUCOSE UR QL STRIP: NEGATIVE
HCT VFR BLD AUTO: 34.8 % (ref 40–54)
HGB BLD-MCNC: 12 G/DL (ref 14–18)
HGB UR QL STRIP: ABNORMAL
HYALINE CASTS #/AREA URNS LPF: 0 /LPF
IMM GRANULOCYTES # BLD AUTO: 0.04 K/UL (ref 0–0.04)
IMM GRANULOCYTES NFR BLD AUTO: 0.4 % (ref 0–0.5)
INR PPP: 1 (ref 0.8–1.2)
INR PPP: 1 (ref 0.8–1.2)
KETONES UR QL STRIP: NEGATIVE
LACTATE SERPL-SCNC: 1.6 MMOL/L (ref 0.5–2.2)
LEUKOCYTE ESTERASE UR QL STRIP: ABNORMAL
LYMPHOCYTES # BLD AUTO: 1.1 K/UL (ref 1–4.8)
LYMPHOCYTES NFR BLD: 11 % (ref 18–48)
MCH RBC QN AUTO: 29.7 PG (ref 27–31)
MCHC RBC AUTO-ENTMCNC: 34.5 G/DL (ref 32–36)
MCV RBC AUTO: 86 FL (ref 82–98)
MICROSCOPIC COMMENT: ABNORMAL
MONOCYTES # BLD AUTO: 1.2 K/UL (ref 0.3–1)
MONOCYTES NFR BLD: 12 % (ref 4–15)
NEUTROPHILS # BLD AUTO: 7.7 K/UL (ref 1.8–7.7)
NEUTROPHILS NFR BLD: 75.5 % (ref 38–73)
NITRITE UR QL STRIP: NEGATIVE
NRBC BLD-RTO: 0 /100 WBC
PH UR STRIP: >8 [PH] (ref 5–8)
PLATELET # BLD AUTO: 181 K/UL (ref 150–350)
PMV BLD AUTO: 9.3 FL (ref 9.2–12.9)
POC PH VENOUS: 7.42
POCT GLUCOSE: 176 MG/DL (ref 70–110)
POCT GLUCOSE: 198 MG/DL (ref 70–110)
POCT GLUCOSE: 199 MG/DL (ref 70–110)
POCT GLUCOSE: 211 MG/DL (ref 70–110)
POCT GLUCOSE: 268 MG/DL (ref 70–110)
POTASSIUM SERPL-SCNC: 4.2 MMOL/L (ref 3.5–5.1)
PROT SERPL-MCNC: 7.3 G/DL (ref 6–8.4)
PROT UR QL STRIP: ABNORMAL
PROTHROMBIN TIME: 10.6 SEC (ref 9–12.5)
PROTHROMBIN TIME: 10.6 SEC (ref 9–12.5)
RBC # BLD AUTO: 4.04 M/UL (ref 4.6–6.2)
RBC #/AREA URNS HPF: 4 /HPF (ref 0–4)
SARS-COV-2 RDRP RESP QL NAA+PROBE: NEGATIVE
SODIUM SERPL-SCNC: 137 MMOL/L (ref 136–145)
SP GR UR STRIP: 1.02 (ref 1–1.03)
TROPONIN I SERPL DL<=0.01 NG/ML-MCNC: 0.02 NG/ML (ref 0–0.03)
TROPONIN I SERPL DL<=0.01 NG/ML-MCNC: 0.03 NG/ML (ref 0–0.03)
URN SPEC COLLECT METH UR: ABNORMAL
UROBILINOGEN UR STRIP-ACNC: NEGATIVE EU/DL
WBC # BLD AUTO: 10.16 K/UL (ref 3.9–12.7)
WBC #/AREA URNS HPF: >100 /HPF (ref 0–5)

## 2020-10-11 PROCEDURE — 99285 EMERGENCY DEPT VISIT HI MDM: CPT | Mod: 25

## 2020-10-11 PROCEDURE — 36415 COLL VENOUS BLD VENIPUNCTURE: CPT

## 2020-10-11 PROCEDURE — 80053 COMPREHEN METABOLIC PANEL: CPT

## 2020-10-11 PROCEDURE — 87077 CULTURE AEROBIC IDENTIFY: CPT

## 2020-10-11 PROCEDURE — 85025 COMPLETE CBC W/AUTO DIFF WBC: CPT

## 2020-10-11 PROCEDURE — 25000003 PHARM REV CODE 250: Performed by: SURGERY

## 2020-10-11 PROCEDURE — 99223 1ST HOSP IP/OBS HIGH 75: CPT | Mod: AI,,, | Performed by: INTERNAL MEDICINE

## 2020-10-11 PROCEDURE — 84484 ASSAY OF TROPONIN QUANT: CPT | Mod: 91

## 2020-10-11 PROCEDURE — 94760 N-INVAS EAR/PLS OXIMETRY 1: CPT

## 2020-10-11 PROCEDURE — 25000003 PHARM REV CODE 250: Performed by: INTERNAL MEDICINE

## 2020-10-11 PROCEDURE — 99223 PR INITIAL HOSPITAL CARE,LEVL III: ICD-10-PCS | Mod: AI,,, | Performed by: INTERNAL MEDICINE

## 2020-10-11 PROCEDURE — 87186 SC STD MICRODIL/AGAR DIL: CPT

## 2020-10-11 PROCEDURE — 81000 URINALYSIS NONAUTO W/SCOPE: CPT

## 2020-10-11 PROCEDURE — 85610 PROTHROMBIN TIME: CPT

## 2020-10-11 PROCEDURE — 87086 URINE CULTURE/COLONY COUNT: CPT

## 2020-10-11 PROCEDURE — 63600175 PHARM REV CODE 636 W HCPCS: Performed by: SURGERY

## 2020-10-11 PROCEDURE — 93005 ELECTROCARDIOGRAM TRACING: CPT

## 2020-10-11 PROCEDURE — 82962 GLUCOSE BLOOD TEST: CPT

## 2020-10-11 PROCEDURE — C9399 UNCLASSIFIED DRUGS OR BIOLOG: HCPCS | Performed by: SURGERY

## 2020-10-11 PROCEDURE — 63600175 PHARM REV CODE 636 W HCPCS: Performed by: INTERNAL MEDICINE

## 2020-10-11 PROCEDURE — 83605 ASSAY OF LACTIC ACID: CPT

## 2020-10-11 PROCEDURE — 96367 TX/PROPH/DG ADDL SEQ IV INF: CPT

## 2020-10-11 PROCEDURE — 25000003 PHARM REV CODE 250: Performed by: EMERGENCY MEDICINE

## 2020-10-11 PROCEDURE — 87088 URINE BACTERIA CULTURE: CPT

## 2020-10-11 PROCEDURE — G0427 INPT/ED TELECONSULT70: HCPCS | Mod: GT,,, | Performed by: PSYCHIATRY & NEUROLOGY

## 2020-10-11 PROCEDURE — 96376 TX/PRO/DX INJ SAME DRUG ADON: CPT

## 2020-10-11 PROCEDURE — 97161 PT EVAL LOW COMPLEX 20 MIN: CPT

## 2020-10-11 PROCEDURE — 63600175 PHARM REV CODE 636 W HCPCS: Performed by: EMERGENCY MEDICINE

## 2020-10-11 PROCEDURE — 85730 THROMBOPLASTIN TIME PARTIAL: CPT

## 2020-10-11 PROCEDURE — G0427 PR INPT TELEHEALTH CON 70/>M: ICD-10-PCS | Mod: GT,,, | Performed by: PSYCHIATRY & NEUROLOGY

## 2020-10-11 PROCEDURE — 93010 ELECTROCARDIOGRAM REPORT: CPT | Mod: ,,, | Performed by: INTERNAL MEDICINE

## 2020-10-11 PROCEDURE — 96372 THER/PROPH/DIAG INJ SC/IM: CPT | Mod: 59

## 2020-10-11 PROCEDURE — U0002 COVID-19 LAB TEST NON-CDC: HCPCS

## 2020-10-11 PROCEDURE — 96361 HYDRATE IV INFUSION ADD-ON: CPT

## 2020-10-11 PROCEDURE — 82800 BLOOD PH: CPT | Performed by: EMERGENCY MEDICINE

## 2020-10-11 PROCEDURE — 96365 THER/PROPH/DIAG IV INF INIT: CPT

## 2020-10-11 PROCEDURE — 93010 EKG 12-LEAD: ICD-10-PCS | Mod: ,,, | Performed by: INTERNAL MEDICINE

## 2020-10-11 PROCEDURE — G0378 HOSPITAL OBSERVATION PER HR: HCPCS

## 2020-10-11 RX ORDER — OXYBUTYNIN CHLORIDE 5 MG/1
5 TABLET, EXTENDED RELEASE ORAL DAILY
Status: DISCONTINUED | OUTPATIENT
Start: 2020-10-11 | End: 2020-10-12 | Stop reason: HOSPADM

## 2020-10-11 RX ORDER — ATORVASTATIN CALCIUM 40 MG/1
40 TABLET, FILM COATED ORAL DAILY
Status: DISCONTINUED | OUTPATIENT
Start: 2020-10-12 | End: 2020-10-12 | Stop reason: HOSPADM

## 2020-10-11 RX ORDER — HYDROCODONE BITARTRATE AND ACETAMINOPHEN 5; 325 MG/1; MG/1
1 TABLET ORAL EVERY 4 HOURS PRN
Status: DISCONTINUED | OUTPATIENT
Start: 2020-10-11 | End: 2020-10-12 | Stop reason: HOSPADM

## 2020-10-11 RX ORDER — IBUPROFEN 200 MG
24 TABLET ORAL
Status: DISCONTINUED | OUTPATIENT
Start: 2020-10-11 | End: 2020-10-12 | Stop reason: HOSPADM

## 2020-10-11 RX ORDER — IRBESARTAN 150 MG/1
300 TABLET ORAL NIGHTLY
Status: DISCONTINUED | OUTPATIENT
Start: 2020-10-11 | End: 2020-10-11

## 2020-10-11 RX ORDER — CLOPIDOGREL BISULFATE 75 MG/1
75 TABLET ORAL DAILY
Status: DISCONTINUED | OUTPATIENT
Start: 2020-10-12 | End: 2020-10-12 | Stop reason: HOSPADM

## 2020-10-11 RX ORDER — ASPIRIN 81 MG/1
81 TABLET ORAL DAILY
Status: DISCONTINUED | OUTPATIENT
Start: 2020-10-12 | End: 2020-10-12 | Stop reason: HOSPADM

## 2020-10-11 RX ORDER — DILTIAZEM HYDROCHLORIDE 120 MG/1
240 CAPSULE, COATED, EXTENDED RELEASE ORAL DAILY
Status: DISCONTINUED | OUTPATIENT
Start: 2020-10-12 | End: 2020-10-12 | Stop reason: HOSPADM

## 2020-10-11 RX ORDER — CIPROFLOXACIN 2 MG/ML
400 INJECTION, SOLUTION INTRAVENOUS
Status: DISCONTINUED | OUTPATIENT
Start: 2020-10-11 | End: 2020-10-12 | Stop reason: HOSPADM

## 2020-10-11 RX ORDER — SODIUM CHLORIDE 9 MG/ML
INJECTION, SOLUTION INTRAVENOUS CONTINUOUS
Status: DISCONTINUED | OUTPATIENT
Start: 2020-10-11 | End: 2020-10-12 | Stop reason: HOSPADM

## 2020-10-11 RX ORDER — SODIUM CHLORIDE 0.9 % (FLUSH) 0.9 %
10 SYRINGE (ML) INJECTION
Status: DISCONTINUED | OUTPATIENT
Start: 2020-10-11 | End: 2020-10-11

## 2020-10-11 RX ORDER — GLUCAGON 1 MG
1 KIT INJECTION
Status: DISCONTINUED | OUTPATIENT
Start: 2020-10-11 | End: 2020-10-12 | Stop reason: HOSPADM

## 2020-10-11 RX ORDER — SODIUM CHLORIDE 9 MG/ML
1000 INJECTION, SOLUTION INTRAVENOUS
Status: COMPLETED | OUTPATIENT
Start: 2020-10-11 | End: 2020-10-11

## 2020-10-11 RX ORDER — INSULIN ASPART 100 [IU]/ML
8 INJECTION, SOLUTION INTRAVENOUS; SUBCUTANEOUS
Status: DISCONTINUED | OUTPATIENT
Start: 2020-10-11 | End: 2020-10-12 | Stop reason: HOSPADM

## 2020-10-11 RX ORDER — IBUPROFEN 200 MG
16 TABLET ORAL
Status: DISCONTINUED | OUTPATIENT
Start: 2020-10-11 | End: 2020-10-12 | Stop reason: HOSPADM

## 2020-10-11 RX ORDER — ACETAMINOPHEN 325 MG/1
650 TABLET ORAL EVERY 8 HOURS PRN
Status: DISCONTINUED | OUTPATIENT
Start: 2020-10-11 | End: 2020-10-12 | Stop reason: HOSPADM

## 2020-10-11 RX ORDER — METHYLPHENIDATE HYDROCHLORIDE 5 MG/1
10 TABLET ORAL 2 TIMES DAILY WITH MEALS
Status: DISCONTINUED | OUTPATIENT
Start: 2020-10-11 | End: 2020-10-12 | Stop reason: HOSPADM

## 2020-10-11 RX ORDER — ATENOLOL 25 MG/1
50 TABLET ORAL DAILY
Status: DISCONTINUED | OUTPATIENT
Start: 2020-10-12 | End: 2020-10-12 | Stop reason: HOSPADM

## 2020-10-11 RX ORDER — DONEPEZIL HYDROCHLORIDE 5 MG/1
5 TABLET, FILM COATED ORAL NIGHTLY
Status: DISCONTINUED | OUTPATIENT
Start: 2020-10-11 | End: 2020-10-12 | Stop reason: HOSPADM

## 2020-10-11 RX ORDER — INSULIN ASPART 100 [IU]/ML
0-5 INJECTION, SOLUTION INTRAVENOUS; SUBCUTANEOUS
Status: DISCONTINUED | OUTPATIENT
Start: 2020-10-11 | End: 2020-10-12 | Stop reason: HOSPADM

## 2020-10-11 RX ORDER — SODIUM CHLORIDE 0.9 % (FLUSH) 0.9 %
10 SYRINGE (ML) INJECTION
Status: DISCONTINUED | OUTPATIENT
Start: 2020-10-11 | End: 2020-10-12 | Stop reason: HOSPADM

## 2020-10-11 RX ORDER — ONDANSETRON 2 MG/ML
4 INJECTION INTRAMUSCULAR; INTRAVENOUS EVERY 8 HOURS PRN
Status: DISCONTINUED | OUTPATIENT
Start: 2020-10-11 | End: 2020-10-12 | Stop reason: HOSPADM

## 2020-10-11 RX ORDER — FAMOTIDINE 20 MG/1
40 TABLET, FILM COATED ORAL DAILY
Status: DISCONTINUED | OUTPATIENT
Start: 2020-10-11 | End: 2020-10-12 | Stop reason: HOSPADM

## 2020-10-11 RX ADMIN — SODIUM CHLORIDE: 0.9 INJECTION, SOLUTION INTRAVENOUS at 08:10

## 2020-10-11 RX ADMIN — CIPROFLOXACIN 400 MG: 2 INJECTION, SOLUTION INTRAVENOUS at 11:10

## 2020-10-11 RX ADMIN — OXYBUTYNIN CHLORIDE 5 MG: 5 TABLET, EXTENDED RELEASE ORAL at 12:10

## 2020-10-11 RX ADMIN — INSULIN DETEMIR 18 UNITS: 100 INJECTION, SOLUTION SUBCUTANEOUS at 08:10

## 2020-10-11 RX ADMIN — INSULIN ASPART 8 UNITS: 100 INJECTION, SOLUTION INTRAVENOUS; SUBCUTANEOUS at 11:10

## 2020-10-11 RX ADMIN — INSULIN ASPART 8 UNITS: 100 INJECTION, SOLUTION INTRAVENOUS; SUBCUTANEOUS at 04:10

## 2020-10-11 RX ADMIN — SERTRALINE HYDROCHLORIDE 150 MG: 100 TABLET ORAL at 12:10

## 2020-10-11 RX ADMIN — SODIUM CHLORIDE 1000 ML: 0.9 INJECTION, SOLUTION INTRAVENOUS at 05:10

## 2020-10-11 RX ADMIN — INSULIN ASPART 3 UNITS: 100 INJECTION, SOLUTION INTRAVENOUS; SUBCUTANEOUS at 11:10

## 2020-10-11 RX ADMIN — DONEPEZIL HYDROCHLORIDE 5 MG: 5 TABLET, FILM COATED ORAL at 08:10

## 2020-10-11 RX ADMIN — CIPROFLOXACIN 400 MG: 2 INJECTION, SOLUTION INTRAVENOUS at 10:10

## 2020-10-11 RX ADMIN — CEFTRIAXONE 2 G: 2 INJECTION, SOLUTION INTRAVENOUS at 05:10

## 2020-10-11 RX ADMIN — FAMOTIDINE 40 MG: 20 TABLET, FILM COATED ORAL at 08:10

## 2020-10-11 NOTE — CONSULTS
JULIAN consulted due to patient stating to his nurse that he feels safe at home 'sometimes.' Patient's nurse states that the patient stated that his wife takes care of him sometimes but is sometimes distracted by other tasks and doesn't care for him.     JULINA first met with patient who states that he lives at home with his spouse, Bekah. He states that prior to this admission, he was independent at home but that his wife manages his diabetes and cooks for him. He states that he has equipment at home to include a walker, glucometer, BSC, and shower chair. He states that he has home health coming a 'few days a week' to help clean the house and with whatever other tasks he needs help with. SW inquired about patient not feeling safe at home to which he replied that he does feel safe at home. He states that him and his wife go out quite a bit and that he feels unsafe when he goes out. When SW asked him to explain, patient began rambling about how getting into one fight while out can cause problems and you have to be able to trust the friends that you are around with your life. SW asked for further clarification but patient unable to verbalize. Continues to state that he has everything he needs at home, feels safe there and is cared for at home.     JULIAN then contacted patient's spouse who states that the patient does live with her and that they have been  for over 40 years. She states that he has dementia and has had a recent stroke. She explains that he has difficulty word finding and expressing himself. She states that he will always say that he is fine and doesn't need anything because he wants to be left alone. Patient's spouse indicates that the patient has sitter services through [facility] 3 days per week, 4 hours per day. She has contacted them to inquire about increasing services to 5 days per week, 6 hours per day. She does admit to being tired occasionally with helping her  because she herself has  dealt with some health issues as of late. She states that she got out of the hospital yesterday after receiving 2 units of blood. She states that she is with the patient 24/7 but likes to have the assistance of [facility] for relief. She states that the patient is her best friend and they have agreed to never put the other into a nursing home. She endorses that she assists the patient with managing his diabetes. She also states that the patient is able to complete ADL's physically but requires consistent prompting when performing those tasks. For example, she states that she has to be in the bathroom for showering, brushing teeth and toiletting because the patient will forget why he entered the bathroom. She states that he is incontinent and she sets alarms to remind him to go to the bathroom in an effort to not have to clean up so much after he uses the bathroom. Spouse is agreeable to home health at discharge if ordered by MD. Patient not currently active with home health.     SW review of patient's record indicate that the patient recently had home health care services with [facility] and [facility]. Neither document a concern for the patient's well-being in the home.     Based on information gathered from speaking with patient and spouse, it does appear that the patient is well cared for at home with no indications of abuse or neglect at this time. Spouse does admit to feeling overwhelmed with patient's care as his health is declining but has sought out the help of [facility] for assistance. SW to remain available should other concerns arise during patient's admission. SW to also check-in with patient everyday during inpatient stay to assess safety in the home.     Maryjo Ayala, TAMSW

## 2020-10-11 NOTE — ED PROVIDER NOTES
Encounter Date: 10/11/2020       History     Chief Complaint   Patient presents with    Fall     Discussed with the wife at length was a very poor historian.  The wife states that he was last normal at noon.  Her initial story said his he was last normal at 3:00 a.m. however she later changed his story said at noon he was change in his mental status and he became less talkative.  He has no known with new weakness only shuffling of the feet which is a little bit worse than previously.  He normally show pulses feet but now is shuffling more.  He is also talking last.  He has got no numbness anywhere no new weakness.  No headache.  Then tonight at midnight he was weak and he fell down while he was sweeping.  he fell down onto his back and may have hit his head she is unsure.    When he fell down tonight he said he was sweeping the floor and he fell down.  He landed on his left side and he has pain in his left chest wall.  He denies any chest pain or shortness of breath.        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 Love By mri.  Several active lesions, many old. 5/13: MRA brain/neck, MRI brain w/wo contrast show no vascular occlusion, multiple foci of hyperintensity in deep cerebral periventricular white matter in pattern of demyelinating process.  5/17: MRI spine performed, no spinal cord lesions. Hospitalization 6/2017. EEG was performed negative for seizures.  Repeat MRI showing new lesion, question whether this was demyelination versus CVA. 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 Findings compatible with a small acute lacunar infarct adjacent to the left  frontal horn.  Nonspecific enhancement just inferior to this region and extending into the left basal ganglia, as well as within the inferior aspect of the left cerebellum.  No evidence of a focal mass. 2.  Extensive increased signal intensity involving the periventricular white matter compatible with demyelinating disease versus vasculitis. 3.  Clinical correlation and followup recommended. 4.  This reportedly flattened in the EPIC and the corpus callosum medical record system.    Mixed hyperlipidemia 11/9/2012    NAFLD (nonalcoholic fatty liver disease) 10/11/2013    CHASE (nonalcoholic steatohepatitis)     Obesity     Stroke     Type 2 diabetes mellitus with diabetic polyneuropathy, with long-term current use of insulin 5/14/2017    Type 2 diabetes mellitus with hyperglycemia, with long-term current use of insulin 10/11/2013     Past Surgical History:   Procedure Laterality Date    COLONOSCOPY N/A 5/21/2019    Procedure: COLONOSCOPY;  Surgeon: Alex Ascencio MD;  Location: [facility];  Service: Endoscopy;  Laterality: N/A;  confirmed appt-sp    INSERTION OF TUNNELED CENTRAL VENOUS CATHETER (CVC) WITH SUBCUTANEOUS PORT N/A 12/7/2018    Procedure: [id number];  Surgeon: Luan Diagnostic Provider;  Location: [facility] OR [address] [place] [address];  Service: Radiology;  Laterality: N/A;    SKIN CANCER EXCISION       Family History   Problem Relation Age of Onset    Heart disease Mother     Hypertension Mother     Heart failure Mother     Cataracts Mother     Cancer Father         lung    Diabetes Maternal Aunt     Stroke Sister     Rheum arthritis Paternal Grandmother     Amblyopia Neg Hx     Blindness Neg Hx     Glaucoma Neg Hx     Macular degeneration Neg Hx     Retinal detachment Neg Hx     Strabismus Neg Hx      Social History     Tobacco Use    Smoking status: Never Smoker    Smokeless tobacco: Never Used   Substance Use Topics    Alcohol use: Not Currently     Alcohol/week: 0.0 standard drinks      Frequency: Never     Drinks per session: 1 or 2     Binge frequency: Never    Drug use: No     Review of Systems   Constitutional: Negative for fever.   HENT: Negative for sore throat.    Respiratory: Negative for shortness of breath.    Cardiovascular: Negative for chest pain.   Gastrointestinal: Negative for nausea.   Genitourinary: Negative for dysuria.   Musculoskeletal: Negative for back pain.   Skin: Negative for rash.   Neurological: Positive for weakness.   Hematological: Does not bruise/bleed easily.   All other systems reviewed and are negative.      Physical Exam     Initial Vitals [10/11/20 0321]   BP Pulse Resp Temp SpO2   (!) 98/59 74 18 98.2 °F (36.8 °C) (!) 94 %      MAP       --         Physical Exam    Nursing note and vitals reviewed.  Constitutional: He appears well-developed and well-nourished.   HENT:   Head: Normocephalic and atraumatic.   Eyes: Pupils are equal, round, and reactive to light.   Neck: Normal range of motion.   Cardiovascular: Normal rate and regular rhythm.   Chest wall tender left    Pulmonary/Chest: Breath sounds normal. No respiratory distress. He has no wheezes. He has no rhonchi. He has no rales.   Abdominal: Soft.   Musculoskeletal: Normal range of motion.      Comments: Mid back tednerness minimal    Neurological: He is alert and oriented to person, place, and time. He has normal strength and normal reflexes. GCS score is 15. GCS eye subscore is 4. GCS verbal subscore is 5. GCS motor subscore is 6.   Skin: Skin is warm and dry. Capillary refill takes less than 2 seconds.         ED Course   Procedures  Labs Reviewed   CBC W/ AUTO DIFFERENTIAL - Abnormal; Notable for the following components:       Result Value    RBC 4.04 (*)     Hemoglobin 12.0 (*)     Hematocrit 34.8 (*)     Mono # 1.2 (*)     Gran% 75.5 (*)     Lymph% 11.0 (*)     All other components within normal limits   COMPREHENSIVE METABOLIC PANEL - Abnormal; Notable for the following components:    CO2 22 (*)      Glucose 246 (*)     BUN, Bld 31 (*)     Creatinine 2.8 (*)     eGFR if  27 (*)     eGFR if non  23 (*)     All other components within normal limits   POCT GLUCOSE - Abnormal; Notable for the following components:    POCT Glucose 211 (*)     All other components within normal limits   PROTIME-INR   PROTIME-INR   APTT   TROPONIN I   SARS-COV-2 RNA AMPLIFICATION, QUAL     EKG Readings: (Independently Interpreted)   Initial Reading: No STEMI. Rhythm: Normal Sinus Rhythm.   No st elevation ns t wave abnormality        Imaging Results          CT Chest Abdomen Pelvis Without Contrast (XPD) (In process)                CT Head Without Contrast (In process)  Result time 10/11/20 03:35:53                 Medical Decision Making:   Differential Diagnosis:   Tele stroke evaluated the patient they felt he was not a tPA candidate and he had other reasons to explain his new onset confusion.  A urinary tract infection could also explain his confusion and his falling down his blood pressure was low at 98 systolic and he has some stranding on his kidney therefore with a dirty appearing urine is likely urinary tract infection that will require antibiotics and possible admission.  His creatinine is also elevated to 2.8 above is 1.5 baseline.  CT of the head shows no acute changes no intracranial bleeding.  CT of the ribs shows a lateral aspect left rib fracture.  No pneumothorax.   Clinical Tests:   Lab Tests: Reviewed       <> Summary of Lab: Elevated creat 2.8 and also mild elev of troponin   ED Management:  Not likely cardiac in nature and he has no chest pain.  And he is also on blood thinners.  His elevation in troponin is possibly due to change in kidney function.  His creatinine is 2.8 up from 1.5 baseline.  He has no fever or chills.  His decreased mental status per his wife with decreasing talking is nonfocal and is likely metabolic due to infection.    telestroke does not feel this is a  "stroke , they were called as wife feels he was acting " like his strokes" and wanted a stroke evaluation to be sure.     Other:   I have discussed this case with another health care provider.       <> Summary of the Discussion: RONALD telestroke : not likely CVA no role for TPA did not recommend MRI       Dw Dr Orr and his decrease mental status likely metabolic in nature                    ED Course as of Oct 11 0527   Sun Oct 11, 2020   0446 Creatinine(!): 2.8 [AB]   0525 He has multiple issues today he has a left rib fracture and an elevated creatinine and a urinary tract infection.  I recommend keeping him in the hospital and giving IV antibiotics and monitoring his vital signs.  He has not likely septic.  He has no fever or tachycardia and looks well however his pressure is little bit low and he has poor renal output therefore a keep an evaluate in the hospital.    [AB]      ED Course User Index  [AB] Lobito Chen MD            Clinical Impression:       ICD-10-CM ICD-9-CM   1. Fall, initial encounter  W19.XXXA E888.9   2. Fall  W19.XXXA E888.9   3. Syncope and collapse  R55 780.2   4. Closed fracture of one rib of left side, initial encounter  S22.32XA 807.01   5. Renal insufficiency  N28.9 593.9   6. Elevated troponin  R77.8 790.6   7. Injury of head, initial encounter  S09.90XA 959.01   8. Acute cystitis without hematuria  N30.00 595.0                      Disposition:   Disposition: Admitted  Condition: Stable                          Lobito Chen MD  10/12/20 0034       Lobito Chen MD  10/12/20 0035    "

## 2020-10-11 NOTE — ED NOTES
"After wife was talking about patient's fall to MD who was present at triage, she stated "I think he may be having a stroke because I'm confused. I've been in the hospital......"  No deficits noted. Pt AAOx4.  MD was in triage and ordered Pt be sent straight to CT. Nurse followed to take blood glucose.  "

## 2020-10-11 NOTE — ASSESSMENT & PLAN NOTE
Hold home PO meds/victoza  Levemir 18 units and aspart 8 units TID with meals +SSI  Diabetic diet  accucheck  Adjust insulin as needed

## 2020-10-11 NOTE — H&P
[facility] Medicine  History & Physical    Patient Name: Bessy Nunez  MRN: [id number]  Admission Date: 10/11/2020  Attending Physician: Ivett Orr MD   Primary Care Provider: Edgar Nova MD         Patient information was obtained from patient and ER records.     Subjective:     Principal Problem:Acute metabolic encephalopathy    Chief Complaint:   Chief Complaint   Patient presents with    Fall        HPI: Patient presented to ER with generalized weakness and a fall at home. States he was sweeping cat food off the floor last night and went to push a chair out of the way, didn't realize it was on wheels, and feel onto his left side. In ER was found to have left rib fracture---CT report not yet completed and possible stranding around the kidneys. He denies dysuria, hematuria, frequency, flank pain or fevers. He has BPH sx of incomplete bladder emptying and straining for quite some time. He has h/o CVA and CNS vasculitis with chronic gait abnormalities. This morning he is alert, oriented and denies any memory changes, new onset weakness or gait changes. However, per ER, he was confused on arrival and wife reported new onset shuffling gait. telestroke initiated as wife was concerned for new CVA with these changes. Telestroke diagnosed as acute encephalopathy---no recs for MRI per notes or further TIA/CVA work up.     Labs show no leukocytosis. Cr 2.8 from 1.3-1.5 baseline. Lactic acid normal. Not hypotensive. Urine >100 WBC, cloudy and many bacteria; no casts. Admitted for UTI (possible pyleo pending final CT read), dehydration and metabolic encephalopathy to be treated with IVF, IV antibx. Mental status already seems to be improving.     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 Love By mri.  Several active lesions, many old. 5/13: MRA brain/neck, MRI brain w/wo contrast show no vascular occlusion, multiple  foci of hyperintensity in deep cerebral periventricular white matter in pattern of demyelinating process.  5/17: MRI spine performed, no spinal cord lesions. Hospitalization 6/2017. EEG was performed negative for seizures.  Repeat MRI showing new lesion, question whether this was demyelination versus CVA. 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 Findings compatible with a small acute lacunar infarct adjacent to the left frontal horn.  Nonspecific enhancement just inferior to this region and extending into the left basal ganglia, as well as within the inferior aspect of the left cerebellum.  No evidence of a focal mass. 2.  Extensive increased signal intensity involving the periventricular white matter compatible with demyelinating disease versus vasculitis. 3.  Clinical correlation and followup recommended. 4.  This reportedly flattened in the EPIC and the corpus callosum medical record system.    Mixed hyperlipidemia 11/9/2012    NAFLD (nonalcoholic fatty liver disease) 10/11/2013    CHASE (nonalcoholic steatohepatitis)     Obesity     Stroke     Type 2 diabetes mellitus with diabetic polyneuropathy, with long-term current use of insulin 5/14/2017    Type 2 diabetes mellitus with hyperglycemia, with long-term current use of insulin 10/11/2013       Past Surgical History:   Procedure Laterality Date    COLONOSCOPY N/A 5/21/2019    Procedure: COLONOSCOPY;  Surgeon: Alex Ascencio MD;  Location: [facility];  Service: Endoscopy;  Laterality: N/A;  confirmed appt-sp    INSERTION OF TUNNELED CENTRAL VENOUS CATHETER (CVC) WITH SUBCUTANEOUS PORT N/A 12/7/2018    Procedure: [id number];  Surgeon: Dosc Diagnostic Provider;  Location: [facility] OR [address] [place] [address];  Service: Radiology;  Laterality: N/A;     SKIN CANCER EXCISION         Review of patient's allergies indicates:  No Known Allergies    No current facility-administered medications on file prior to encounter.      Current Outpatient Medications on File Prior to Encounter   Medication Sig    alendronate (FOSAMAX) 70 MG tablet Take 1 tablet (70 mg total) by mouth every 7 days.    aspirin (ECOTRIN) 81 MG EC tablet Take 81 mg by mouth once daily.    atenoloL (TENORMIN) 50 MG tablet Take 1 tablet (50 mg total) by mouth once daily.    atorvastatin (LIPITOR) 40 MG tablet Take 1 tablet (40 mg total) by mouth once daily.    blood sugar diagnostic (TRUE METRIX GLUCOSE TEST STRIP) Strp Test blood sugar four times a day    clopidogreL (PLAVIX) 75 mg tablet Take 1 tablet (75 mg total) by mouth once daily.    diltiaZEM (DILT-XR) 240 MG CDCR Take 1 capsule (240 mg total) by mouth once daily.    donepeziL (ARICEPT) 5 MG tablet Take 1 tablet (5 mg total) by mouth every evening.    FREESTYLE ARELY 14 DAY READER Misc GLUCOSE TESTING : FASTING AND PRIOR TO MEALS    FREESTYLE ARELY 14 DAY SENSOR Kit GLUCOSE TESTING 4 TIMES A DAY    insulin (LANTUS SOLOSTAR U-100 INSULIN) glargine 100 units/mL (3mL) SubQ pen Inject 25 Units into the skin 2 (two) times a day. Up to 40 bid with chemotherapy    insulin aspart U-100 (NOVOLOG) 100 unit/mL (3 mL) InPn pen Inject 10 Units into the skin before meals and at bedtime as needed (Hyperglycemia). Up to 20 BID with chemo    irbesartan (AVAPRO) 300 MG tablet Take 1 tablet (300 mg total) by mouth every evening.    lancets 30 gauge Misc Test blood sugar four times a day    liraglutide 0.6 mg/0.1 mL, 18 mg/3 mL, subq PNIJ (VICTOZA 3-TOMASZ) 0.6 mg/0.1 mL (18 mg/3 mL) PnIj pen Inject 1.8 mg into the skin once daily.    methylphenidate HCl (RITALIN) 10 MG tablet Take 1 tablet (10 mg total) by mouth 2 (two) times daily with meals.    omega-3 fatty acids-vitamin E (FISH OIL) 1,000 mg Cap Take 1 capsule by mouth 2 (two) times daily.     "oxybutynin (DITROPAN-XL) 5 MG TR24 Take 1 tablet (5 mg total) by mouth once daily.    pen needle, diabetic 32 gauge x 5/32" Ndle USE 1 PEN NEEDLE 4 TIMES DAILY (  SINGLE  USE)    sertraline (ZOLOFT) 100 MG tablet 1.5 tablet daily    SUPREP BOWEL PREP KIT 17.5-3.13-1.6 gram SolR USE AS DIRECTED    vitamin D 1000 units Tab Take 5 tablets (5,000 Units total) by mouth once daily.    QUEtiapine (SEROQUEL) 25 MG Tab Take 1 tablet (25 mg total) by mouth every evening.     Family History     Problem Relation (Age of Onset)    Cancer Father    Cataracts Mother    Diabetes Maternal Aunt    Heart disease Mother    Heart failure Mother    Hypertension Mother    Rheum arthritis Paternal Grandmother    Stroke Sister        Tobacco Use    Smoking status: Never Smoker    Smokeless tobacco: Never Used   Substance and Sexual Activity    Alcohol use: Yes     Alcohol/week: 0.0 standard drinks     Frequency: Never     Drinks per session: 1 or 2     Binge frequency: Never     Comment: only on occasion    Drug use: No    Sexual activity: Yes     Partners: Female     Review of Systems   Constitutional: Negative for activity change, fatigue, fever and unexpected weight change.   HENT: Negative for congestion, ear pain, hearing loss, rhinorrhea and sore throat.    Eyes: Negative for pain, redness and visual disturbance.   Respiratory: Negative for cough, shortness of breath and wheezing.    Cardiovascular: Positive for chest pain (left rib pain s/p fall). Negative for palpitations and leg swelling.   Gastrointestinal: Negative for abdominal pain, constipation, diarrhea, nausea and vomiting.   Genitourinary: Positive for difficulty urinating. Negative for decreased urine volume, dysuria, frequency and urgency.   Musculoskeletal: Negative for back pain, joint swelling and neck pain.   Skin: Negative for color change, rash and wound.   Neurological: Positive for weakness (chronic gait changes). Negative for dizziness, tremors, " light-headedness and headaches.     Objective:     Vital Signs (Most Recent):  Temp: 96.9 °F (36.1 °C) (10/11/20 1059)  Pulse: 76 (10/11/20 1059)  Resp: 19 (10/11/20 1059)  BP: (!) 147/81 (10/11/20 1059)  SpO2: 99 % (10/11/20 1059) Vital Signs (24h Range):  Temp:  [96.9 °F (36.1 °C)-98.9 °F (37.2 °C)] 96.9 °F (36.1 °C)  Pulse:  [66-76] 76  Resp:  [17-19] 19  SpO2:  [94 %-99 %] 99 %  BP: ()/(59-81) 147/81     Weight: 99.5 kg (219 lb 5.7 oz)  Body mass index is 31.03 kg/m².    Physical Exam  Vitals signs reviewed.   Constitutional:       General: He is not in acute distress.     Appearance: He is well-developed.   HENT:      Head: Normocephalic and atraumatic.      Right Ear: External ear normal.      Left Ear: External ear normal.      Nose: Nose normal.      Mouth/Throat:      Mouth: Mucous membranes are moist.      Pharynx: Oropharynx is clear.   Eyes:      General:         Right eye: No discharge.         Left eye: No discharge.      Extraocular Movements: Extraocular movements intact.      Conjunctiva/sclera: Conjunctivae normal.      Pupils: Pupils are equal, round, and reactive to light.   Neck:      Musculoskeletal: Neck supple.      Thyroid: No thyromegaly.   Cardiovascular:      Rate and Rhythm: Normal rate and regular rhythm.      Heart sounds: No murmur. No friction rub. No gallop.    Pulmonary:      Effort: Pulmonary effort is normal. No respiratory distress.      Breath sounds: Normal breath sounds. No wheezing or rales.   Abdominal:      General: Bowel sounds are normal. There is no distension.      Palpations: Abdomen is soft.      Tenderness: There is no abdominal tenderness. There is no right CVA tenderness, left CVA tenderness, guarding or rebound.   Lymphadenopathy:      Cervical: No cervical adenopathy.   Skin:     General: Skin is warm and dry.   Neurological:      Mental Status: He is alert and oriented to person, place, and time. Mental status is at baseline.      Comments: Gait not  assessed but PT did eval (see notes)     Psychiatric:         Behavior: Behavior normal.           CRANIAL NERVES     CN III, IV, VI   Pupils are equal, round, and reactive to light.       Significant Labs:   CBC:   Recent Labs   Lab 10/11/20  0348   WBC 10.16   HGB 12.0*   HCT 34.8*        CMP:   Recent Labs   Lab 10/11/20  0348      K 4.2      CO2 22*   *   BUN 31*   CREATININE 2.8*   CALCIUM 9.8   PROT 7.3   ALBUMIN 4.1   BILITOT 0.9   ALKPHOS 89   AST 15   ALT 16   ANIONGAP 15   EGFRNONAA 23*     Cardiac Markers: No results for input(s): CKMB, MYOGLOBIN, BNP, TROPISTAT in the last 48 hours.  Troponin:   Recent Labs   Lab 10/11/20  0348 10/11/20  0509   TROPONINI 0.031* 0.030*     Urine Culture: No results for input(s): LABURIN in the last 48 hours.  Urine Studies:   Recent Labs   Lab 10/11/20  0456   COLORU Yellow   APPEARANCEUA Cloudy*   PHUR >8.0   SPECGRAV 1.025   PROTEINUA 3+*   GLUCUA Negative   KETONESU Negative   BILIRUBINUA Negative   OCCULTUA 1+*   NITRITE Negative   UROBILINOGEN Negative   LEUKOCYTESUR 3+*   RBCUA 4   WBCUA >100*   BACTERIA Many*   HYALINECASTS 0     All pertinent labs within the past 24 hours have been reviewed.    Significant Imaging: I have reviewed all pertinent imaging results/findings within the past 24 hours.    Assessment/Plan:     * Acute metabolic encephalopathy  telestroke initiated in ER--thought to be metabolic and not TIA/CVA  + UA and per ER report CT showing renal stranding conerning for pyelo--start cipro  MANISH with elevated Cr, likely mild dehydration--start IVF  He already seems to be improved this AM  Physical therapy started  Family not at bedside to confirm if he is back to baseline but he is alert and oriented this morning and able to provide history      Acute renal insufficiency  Likely due to dehydartion, UTI  Start IVF  Daily labs  Avoid nephrotoxic agents      Closed fracture of one rib of left side with routine healing  New  problem  S/p fall at home  PRN [place], watch for confusion  No NSAIDs due to renal fxn      Dementia with behavioral disturbance  Cont home aricept  Seems to be at baseline      Osteoporosis with current pathological fracture with routine healing from steroids; compression fx 2017; 4/2019 bisphosphonate  On alendronate at home, weekly      History of stroke 9/2017 L thalamus; 7/2020 R thalamus; felt to be due to small vessel disease, not vasculitis  Cont asa, plavix and statin  Good BP and gluose control  telestroke--no new events  CT head reviewed    Impaired functional mobility, balance, gait, and endurance  Chronic issues with gait and balance  Start PT here, may need home health with PT at discharge again  May also improve as we treat infection and dehydration      CNS vasculitis failed imuran; failed cytoxan; 1/2020 rituxan  History of  No acute neurologic changes to warrant repeat MRI at this time  CT head reviewed      Type 2 diabetes mellitus with diabetic polyneuropathy, with long-term current use of insulin  Hold home PO meds/victoza  Levemir 18 units and aspart 8 units TID with meals +SSI  Diabetic diet  accucheck  Adjust insulin as needed      NAFLD (nonalcoholic fatty liver disease) 6/12/2015 liver biopsy showing advanced stage fibrosis with bridging fibrosis and scattered nodules.  Noted  LFTs normal on admit      Essential hypertension 03/09/2020 TTE concentric LV remodeling.  Normal LV systolic function LVEF 55%.  Grade 2 diastolic dysfunction.  Normal CVP.  PA pressure 20.  Cont home meds of atenolol, diltiazem,--irbesartan held due to MANISH  -150s, dose AM meds now and re-eval  Want good BP control with h/o CVA and no evidence of new TIA/CVA so no need for permissive HTN at this point      Mixed hyperlipidemia  Cont atorvastatin      VTE Risk Mitigation (From admission, onward)         Ordered     IP VTE HIGH RISK PATIENT  Once      10/11/20 0814     Place sequential compression device  Until  discontinued      10/11/20 0814     Place sequential compression device  Until discontinued      10/11/20 0532                   Ivett Orr MD  Department of Hospital Medicine   [facility]

## 2020-10-11 NOTE — ASSESSMENT & PLAN NOTE
Cont home meds of atenolol, diltiazem,--irbesartan held due to MANISH  -150s, dose AM meds now and re-eval  Want good BP control with h/o CVA and no evidence of new TIA/CVA so no need for permissive HTN at this point

## 2020-10-11 NOTE — ASSESSMENT & PLAN NOTE
Cont asa, plavix and statin  Good BP and gluose control  telestroke--no new events  CT head reviewed

## 2020-10-11 NOTE — CONSULTS
[facility]  Vascular Neurology  Comprehensive Stroke Center  Tele-Consultation Note      Consults    Consulting Provider: DALIA RANDHAWA  Current Providers  No providers found    Patient Location:  [facility] EMERGENCY DEPARTMENT Emergency Department  Spoke hospital nurse at bedside with patient assisting consultant.     Patient information was obtained from spouse/SO.         Assessment/Plan:     STROKE DOCUMENTATION     Acute Stroke Times:   Acute Stroke Times   Last Known Normal Date: 10/10/20  Last Known Normal Time: 1200  Stroke Team Arrival Date: 10/11/20(clarifying coonsultaion)  Stroke Team Arrival Time: 0420  CT Interpretation Time: 0433    NIH Scale:  Interval: baseline  1a. Level of Consciousness: 0-->Alert, keenly responsive  1b. LOC Questions: 0-->Answers both questions correctly  1c. LOC Commands: 0-->Performs both tasks correctly  2. Best Gaze: 0-->Normal  3. Visual: 0-->No visual loss  4. Facial Palsy: 0-->Normal symmetrical movements  5a. Motor Arm, Left: 0-->No drift, limb holds 90 (or 45) degrees for full 10 secs  5b. Motor Arm, Right: 0-->No drift, limb holds 90 (or 45) degrees for full 10 secs  6a. Motor Leg, Left: 0-->No drift, leg holds 30 degree position for full 5 secs  6b. Motor Leg, Right: 0-->No drift, leg holds 30 degree position for full 5 secs  7. Limb Ataxia: 0-->Absent  8. Sensory: 0-->Normal, no sensory loss  9. Best Language: 0-->No aphasia, normal  10. Dysarthria: 0-->Normal  11. Extinction and Inattention (formerly Neglect): 0-->No abnormality  Total (NIH Stroke Scale): 0     Modified Mora Score: 0  Jarod Coma Scale:15   ABCD2 Score:    [id number] Score:   HAS -BLED Score:   ICH Score:   Hunt & Wilson Classification:       Diagnoses:   Acute encephalopathy  Not a tPA candidate.  Work up for metabolic enceephalopathy        Blood pressure (!) 98/59, pulse 74, temperature 98.2 °F (36.8 °C), temperature source Oral, resp. rate 18, weight 99.8 kg  (220 lb), SpO2 (!) 94 %.  Alteplase Eligible?: No  Alteplase Recommendation: Alteplase not recommended due to Outside of treatment window   Possible Interventional Revascularization Candidate? No; No significant neurological deficit    Disposition Recommendation: admit to inpatient    Subjective:     History of Present Illness:  telestroke consult requested following fall. Concern for stroke.   Patient fell today at about midnight. Denies focal weakness, no numbness. Slurred speech.  Wife concerned of shuffling gait over the last few weeks  Past Medical History:   Diagnosis Date    Amblyopia     Cataract     CNS vasculitis 6/2/2017    Follows with Dr. Love By mri.  Several active lesions, many old. 5/13: MRA brain/neck, MRI brain w/wo contrast show no vascular occlusion, multiple foci of hyperintensity in deep cerebral periventricular white matter in pattern of demyelinating process.  5/17: MRI spine performed, no spinal cord lesions. Hospitalization 6/2017. EEG was performed negative for seizures.  Repeat MRI showing new lesion, question whether this was demyelination versus CVA. 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 Findings compatible with a small acute lacunar infarct adjacent to the left frontal horn.  Nonspecific enhancement just inferior to this region and extending into the left basal ganglia, as well as within the inferior aspect of the left cerebellum.  No evidence of a focal mass. 2.  Extensive increased signal intensity involving the periventricular white matter compatible with demyelinating disease versus vasculitis. 3.  Clinical correlation and followup recommended. 4.  This reportedly flattened in the EPIC and the corpus callosum medical record system.    Mixed hyperlipidemia  11/9/2012    NAFLD (nonalcoholic fatty liver disease) 10/11/2013    CHASE (nonalcoholic steatohepatitis)     Obesity     Stroke     Type 2 diabetes mellitus with diabetic polyneuropathy, with long-term current use of insulin 5/14/2017    Type 2 diabetes mellitus with hyperglycemia, with long-term current use of insulin 10/11/2013         Woke up with symptoms?: no    Recent bleeding noted: no  Does the patient take any Blood Thinners? no  Medications: Antiplatelets:  aspirin      Past Medical History: hypertension, diabetes and hyperlipidemia    Past Surgical History: no relevant surgical history    Family History: no relevant history    Social History: no smoking, no drinking, no drugs    Allergies: No Known Allergies     Review of Systems   All other systems reviewed and are negative.    Objective:   Vitals: Blood pressure (!) 98/59, pulse 74, temperature 98.2 °F (36.8 °C), temperature source Oral, resp. rate 18, weight 99.8 kg (220 lb), SpO2 (!) 94 %.     CT READ: Yes  No hemmorhage. No mass effect. No early infarct signs.     Physical Exam  Vitals signs reviewed.   Constitutional:       Appearance: Normal appearance.   HENT:      Head: Normocephalic.   Eyes:      Extraocular Movements: Extraocular movements intact.      Pupils: Pupils are equal, round, and reactive to light.   Pulmonary:      Effort: Pulmonary effort is normal.   Neurological:      General: No focal deficit present.      Mental Status: He is alert and oriented to person, place, and time.               Recommended the emergency room physician to have a brief discussion with the patient and/or family if available regarding the risks and benefits of treatment, and to briefly document the occurrence of that discussion in his clinical encounter note.     The attending portion of this evaluation, treatment, and documentation was performed per Ck Chen MD via audiovisual.    Billing code:  ([facility] INPT CONSULT 35MIN)    In  your opinion, this was a: Tier 2 Van Negative    Consult End Time: 4:43 AM     Ck Chen MD  Comprehensive Stroke Center  Vascular Neurology   [facility]

## 2020-10-11 NOTE — ASSESSMENT & PLAN NOTE
New problem  S/p fall at home  PRN [place], watch for confusion  No NSAIDs due to renal fxn

## 2020-10-11 NOTE — PLAN OF CARE
Goals to be met by: 10/16/20    Patient will increase functional independence with mobility by performin. Supine to sit with Independent  2. Sit to supine with Independent  3. Bed to chair transfer with Independentwith or without none using Stand Pivot TECHNIQUE  4. Gait  x ~250  feet with Supervision or Set-up Assistance with or without none  5. Lower extremity exercise program x10 reps per handout, with assistance as needed

## 2020-10-11 NOTE — PLAN OF CARE
Pt admitted with encephalopathy and UTI. CT head negative for anything acute. Urine culture in process. On cipro IV. Afebrile. WBC negative. Pt denies S&S UTI. Creatinine elevated @ 2.8. Placed on continuous  IV fluids. Monitoring output. Pt with history of strokes; spouse states increased confusion at home. Pt alert since admit; mild confusion at times. Fall at home this morning; PT consulted; up with stand by assist. Vitals stable. Sinus rhythm on telemetry. Monitoring blood sugars AC&HS; covering with scheduled and SSI as needed. Remains free from injury/falls. Reviewed plan of care with pt and wife; state agreement.

## 2020-10-11 NOTE — ED NOTES
Pt appears in no distress, awake and alert, VSS, given warm blanket for comfort, states pain is tolerable at this time, VSS, awaiting room transfer.

## 2020-10-11 NOTE — PT/OT/SLP EVAL
"Physical Therapy Evaluation    Patient Name:  Bessy Nunez   MRN:  [id number]    Recommendations:     Discharge Recommendations:  home   Discharge Equipment Recommendations: none   Barriers to discharge: Decreased caregiver support    Assessment:     Bessy Nunez is a 60 y.o. male admitted with a medical diagnosis of Acute metabolic encephalopathy.  He presents with the following impairments/functional limitations:  weakness, gait instability, impaired functional mobilty, impaired balance, pain, impaired self care skills, decreased safety awareness. Patient will benefit from Skilled PT tx to inc safety, inc ind with mobility, gait functions and to return to prior level of functions.    Rehab Prognosis: Fair; patient would benefit from acute skilled PT services to address these deficits and reach maximum level of function.    Recent Surgery: * No surgery found *      Plan:     During this hospitalization, patient to be seen 5 x/week to address the identified rehab impairments via therapeutic activities, therapeutic exercises, gait training and progress toward the following goals:    · Plan of Care Expires:  10/16/20    Subjective     Chief Complaint: left LBP, unsteady gait  Patient/Family Comments/goals: "To get better and to return home".  Pain/Comfort:  · Pain Rating 1: (did not quantify pain accurately)  · Location - Side 1: Left  · Location - Orientation 1: lower  · Location 1: back  · Pain Addressed 1: Reposition, Cessation of Activity  · Pain Rating Post-Intervention 1: (did not quantify pain accurately)    Patients cultural, spiritual, Adventist conflicts given the current situation: no    Living Environment:  Patient lives with wife 2 story house(20 steps with handrails up stairs); no AMANDA.  Prior to admission, patients level of function was Ind with mobility, gait functions with no AD, light household chores such as cleaning/sweeping the house and ADL's.  Equipment used at home: rollator, hospital bed, " bedside commode, shower chair, glucometer.  DME owned (not currently used): none.  Upon discharge, patient will have assistance from wife.    Objective:     Communicated with patient  prior to session.  Patient found supine with telemetry, peripheral IV  upon PT entry to room.    General Precautions: Standard, fall   Orthopedic Precautions:N/A   Braces: N/A     Exams:  · Cognitive Exam:  Patient is oriented to Person, Place and Time  · Fine Motor Coordination:    · -       Intact  · Gross Motor Coordination:  WFL  · Sensation:    · -       Intact  · Skin Integrity/Edema:      · -       Skin integrity: Visible skin intact  · RLE ROM: WFL  · RLE Strength: WFL  · LLE ROM: WFL  · LLE Strength: WFL    Functional Mobility:  · Bed Mobility:     · Rolling Left:  independence  · Rolling Right: independence  · Scooting: supervision  · Supine to Sit: supervision  · Transfers:     · Sit to Stand:  stand by assistance with no AD  · Bed to Chair: stand by assistance with  no AD  using  Stand Pivot and Step Transfer  · Gait: Standby Assistance x ~ 100 feet with no AD. Slight unsteady gait during changing directions.  · Balance: Stand Static: Good-; Dynamic: Good-    Therapeutic Activities and Exercises:   Completed PT eval. Educated patient with safety measures on functional mobility including to use call button to call nursing assistance to go to the bathroom for toileting. Initiated gait functions.    AM-PAC 6 CLICK MOBILITY  Total Score:19     Patient left up in chair with all lines intact, call button in reach and nursing notified.    GOALS:   Multidisciplinary Problems     Physical Therapy Goals        Problem: Physical Therapy Goal    Goal Priority Disciplines Outcome Goal Variances Interventions   Physical Therapy Goal     PT, PT/OT      Description: Goals to be met by: 10/16/20    Patient will increase functional independence with mobility by performin. Supine to sit with Independent  2. Sit to supine with  Independent  3. Bed to chair transfer with Independentwith or without none using Stand Pivot TECHNIQUE  4. Gait  x ~250  feet with Supervision or Set-up Assistance with or without none  5. Lower extremity exercise program x10 reps per handout, with assistance as needed                      History:     Past Medical History:   Diagnosis Date    Amblyopia     Cataract     CNS vasculitis 6/2/2017    Follows with Dr. Love By mri.  Several active lesions, many old. 5/13: MRA brain/neck, MRI brain w/wo contrast show no vascular occlusion, multiple foci of hyperintensity in deep cerebral periventricular white matter in pattern of demyelinating process.  5/17: MRI spine performed, no spinal cord lesions. Hospitalization 6/2017. EEG was performed negative for seizures.  Repeat MRI showing new lesion, question whether this was demyelination versus CVA. 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 Findings compatible with a small acute lacunar infarct adjacent to the left frontal horn.  Nonspecific enhancement just inferior to this region and extending into the left basal ganglia, as well as within the inferior aspect of the left cerebellum.  No evidence of a focal mass. 2.  Extensive increased signal intensity involving the periventricular white matter compatible with demyelinating disease versus vasculitis. 3.  Clinical correlation and followup recommended. 4.  This reportedly flattened in the EPIC and the corpus callosum medical record system.    Mixed hyperlipidemia 11/9/2012    NAFLD (nonalcoholic fatty liver disease) 10/11/2013    CHASE (nonalcoholic steatohepatitis)     Obesity     Stroke     Type 2 diabetes mellitus with diabetic polyneuropathy, with long-term current use of insulin 5/14/2017     Type 2 diabetes mellitus with hyperglycemia, with long-term current use of insulin 10/11/2013       Past Surgical History:   Procedure Laterality Date    COLONOSCOPY N/A 5/21/2019    Procedure: COLONOSCOPY;  Surgeon: Alex Ascencio MD;  Location[facility];  Service: Endoscopy;  Laterality: N/A;  confirmed appt-sp    INSERTION OF TUNNELED CENTRAL VENOUS CATHETER (CVC) WITH SUBCUTANEOUS PORT N/A 12/7/2018    Procedure: [id number];  Surgeon: Luan Diagnostic Provider;  Location: [facility] OR [address] [place] [address];  Service: Radiology;  Laterality: N/A;    SKIN CANCER EXCISION         Time Tracking:     PT Received On: 10/11/20  PT Start Time: 1035     PT Stop Time: 1055  PT Total Time (min): 20 min     Billable Minutes: Evaluation 20      Jcarlos Sanchez, PT  10/11/2020

## 2020-10-11 NOTE — HPI
Patient presented to ER with generalized weakness and a fall at home. States he was sweeping cat food off the floor last night and went to push a chair out of the way, didn't realize it was on wheels, and feel onto his left side. In ER was found to have left rib fracture---CT report not yet completed and possible stranding around the kidneys. He denies dysuria, hematuria, frequency, flank pain or fevers. He has BPH sx of incomplete bladder emptying and straining for quite some time. He has h/o CVA and CNS vasculitis with chronic gait abnormalities. This morning he is alert, oriented and denies any memory changes, new onset weakness or gait changes. However, per ER, he was confused on arrival and wife reported new onset shuffling gait. telestroke initiated as wife was concerned for new CVA with these changes. Telestroke diagnosed as acute encephalopathy---no recs for MRI per notes or further TIA/CVA work up.     Labs show no leukocytosis. Cr 2.8 from 1.3-1.5 baseline. Lactic acid normal. Not hypotensive. Urine >100 WBC, cloudy and many bacteria; no casts. Admitted for UTI (possible pyleo pending final CT read), dehydration and metabolic encephalopathy to be treated with IVF, IV antibx. Mental status already seems to be improving.

## 2020-10-11 NOTE — PLAN OF CARE
10/11/20 1139   Discharge Assessment   Assessment Type Discharge Planning Assessment   Confirmed/corrected address and phone number on facesheet? Yes   Assessment information obtained from? Patient;Caregiver   Prior to hospitilization cognitive status: Alert/Oriented   Prior to hospitalization functional status: Needs Assistance   Current cognitive status: Alert/Oriented   Current Functional Status: Needs Assistance   Facility Arrived From: Home   Lives With spouse   Able to Return to Prior Arrangements yes   Is patient able to care for self after discharge? No   Who are your caregiver(s) and their phone number(s)? Bekah Nunez (Spouse) [phone]   Patient's perception of discharge disposition home health   Readmission Within the Last 30 Days no previous admission in last 30 days   Patient currently being followed by outpatient case management? No   Patient currently receives any other outside agency services? Yes   Name and contact number of agency or person providing outside services [facility]   Equipment Currently Used at Home hospital bed;rollator;glucometer;bedside commode;shower chair   Do you have any problems affording any of your prescribed medications? No   Does the patient have transportation home? Yes   Transportation Anticipated family or friend will provide  (Spouse will provide.)   Discharge Plan A Home with family;Home Health   Discharge Plan B Home with family   DME Needed Upon Discharge  wheelchair   Patient/Family in Agreement with Plan yes       Discharge planning assessment completed with patient and over the phone with spouse. Spouse requesting a wheelchair at discharge and is agreeable to home health if recommended by MD. Please see consult note for further information regarding patient's home environment. SW to remain available as needed for discharge planning.     Maryjo Ayala, HANDY

## 2020-10-11 NOTE — SUBJECTIVE & OBJECTIVE
Woke up with symptoms?: no    Recent bleeding noted: no  Does the patient take any Blood Thinners? no  Medications: Antiplatelets:  aspirin      Past Medical History: hypertension, diabetes and hyperlipidemia    Past Surgical History: no relevant surgical history    Family History: no relevant history    Social History: no smoking, no drinking, no drugs    Allergies: No Known Allergies     Review of Systems   All other systems reviewed and are negative.    Objective:   Vitals: Blood pressure (!) 98/59, pulse 74, temperature 98.2 °F (36.8 °C), temperature source Oral, resp. rate 18, weight 99.8 kg (220 lb), SpO2 (!) 94 %.     CT READ: Yes  No hemmorhage. No mass effect. No early infarct signs.     Physical Exam  Vitals signs reviewed.   Constitutional:       Appearance: Normal appearance.   HENT:      Head: Normocephalic.   Eyes:      Extraocular Movements: Extraocular movements intact.      Pupils: Pupils are equal, round, and reactive to light.   Pulmonary:      Effort: Pulmonary effort is normal.   Neurological:      General: No focal deficit present.      Mental Status: He is alert and oriented to person, place, and time.

## 2020-10-11 NOTE — ASSESSMENT & PLAN NOTE
Chronic issues with gait and balance  Start PT here, may need home health with PT at discharge again  May also improve as we treat infection and dehydration

## 2020-10-11 NOTE — ASSESSMENT & PLAN NOTE
telestroke initiated in ER--thought to be metabolic and not TIA/CVA  + UA and per ER report CT showing renal stranding conerning for pyelo--start cipro  MANISH with elevated Cr, likely mild dehydration--start IVF  He already seems to be improved this AM  Physical therapy started  Family not at bedside to confirm if he is back to baseline but he is alert and oriented this morning and able to provide history

## 2020-10-11 NOTE — ED TRIAGE NOTES
Pt presents after falling backwards and he hurt his back and left side. Used heating pad. Wife states pt fell a couple of hours ago. Pain is 10/10 on his left side and his back. Took aleve for pain.

## 2020-10-11 NOTE — HPI
telestroke consult requested following fall. Concern for stroke.   Patient fell today at about midnight. Denies focal weakness, no numbness. Slurred speech.  Wife concerned of shuffling gait over the last few weeks  Past Medical History:   Diagnosis Date    Amblyopia     Cataract     CNS vasculitis 6/2/2017    Follows with Dr. Love By mri.  Several active lesions, many old. 5/13: MRA brain/neck, MRI brain w/wo contrast show no vascular occlusion, multiple foci of hyperintensity in deep cerebral periventricular white matter in pattern of demyelinating process.  5/17: MRI spine performed, no spinal cord lesions. Hospitalization 6/2017. EEG was performed negative for seizures.  Repeat MRI showing new lesion, question whether this was demyelination versus CVA. 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 Findings compatible with a small acute lacunar infarct adjacent to the left frontal horn.  Nonspecific enhancement just inferior to this region and extending into the left basal ganglia, as well as within the inferior aspect of the left cerebellum.  No evidence of a focal mass. 2.  Extensive increased signal intensity involving the periventricular white matter compatible with demyelinating disease versus vasculitis. 3.  Clinical correlation and followup recommended. 4.  This reportedly flattened in the EPIC and the corpus callosum medical record system.    Mixed hyperlipidemia 11/9/2012    NAFLD (nonalcoholic fatty liver disease) 10/11/2013    CHASE (nonalcoholic steatohepatitis)     Obesity     Stroke     Type 2 diabetes mellitus with diabetic polyneuropathy, with long-term current use of insulin 5/14/2017    Type 2 diabetes mellitus with hyperglycemia, with long-term current use of insulin  10/11/2013

## 2020-10-11 NOTE — SUBJECTIVE & OBJECTIVE
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 Love By mri.  Several active lesions, many old. 5/13: MRA brain/neck, MRI brain w/wo contrast show no vascular occlusion, multiple foci of hyperintensity in deep cerebral periventricular white matter in pattern of demyelinating process.  5/17: MRI spine performed, no spinal cord lesions. Hospitalization 6/2017. EEG was performed negative for seizures.  Repeat MRI showing new lesion, question whether this was demyelination versus CVA. 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 Findings compatible with a small acute lacunar infarct adjacent to the left frontal horn.  Nonspecific enhancement just inferior to this region and extending into the left basal ganglia, as well as within the inferior aspect of the left cerebellum.  No evidence of a focal mass. 2.  Extensive increased signal intensity involving the periventricular white matter compatible with demyelinating disease versus vasculitis. 3.  Clinical correlation and followup recommended. 4.  This reportedly flattened in the EPIC and the corpus callosum medical record system.    Mixed hyperlipidemia 11/9/2012    NAFLD (nonalcoholic fatty liver disease) 10/11/2013    CHASE (nonalcoholic steatohepatitis)     Obesity     Stroke     Type 2 diabetes mellitus with diabetic polyneuropathy, with long-term current use of insulin 5/14/2017    Type 2 diabetes mellitus with hyperglycemia, with long-term current use of insulin 10/11/2013       Past Surgical History:   Procedure Laterality Date    COLONOSCOPY N/A 5/21/2019    Procedure: COLONOSCOPY;  Surgeon: Alex Ascencio MD;  Location: [facility];  Service: Endoscopy;  Laterality: N/A;   confirmed appt-sp    INSERTION OF TUNNELED CENTRAL VENOUS CATHETER (CVC) WITH SUBCUTANEOUS PORT N/A 12/7/2018    Procedure: [id number];  Surgeon: Dosc Diagnostic Provider;  Location: [facility] OR [address] [place] [address];  Service: Radiology;  Laterality: N/A;    SKIN CANCER EXCISION         Review of patient's allergies indicates:  No Known Allergies    No current facility-administered medications on file prior to encounter.      Current Outpatient Medications on File Prior to Encounter   Medication Sig    alendronate (FOSAMAX) 70 MG tablet Take 1 tablet (70 mg total) by mouth every 7 days.    aspirin (ECOTRIN) 81 MG EC tablet Take 81 mg by mouth once daily.    atenoloL (TENORMIN) 50 MG tablet Take 1 tablet (50 mg total) by mouth once daily.    atorvastatin (LIPITOR) 40 MG tablet Take 1 tablet (40 mg total) by mouth once daily.    blood sugar diagnostic (TRUE METRIX GLUCOSE TEST STRIP) Strp Test blood sugar four times a day    clopidogreL (PLAVIX) 75 mg tablet Take 1 tablet (75 mg total) by mouth once daily.    diltiaZEM (DILT-XR) 240 MG CDCR Take 1 capsule (240 mg total) by mouth once daily.    donepeziL (ARICEPT) 5 MG tablet Take 1 tablet (5 mg total) by mouth every evening.    FREESTYLE ARELY 14 DAY READER Misc GLUCOSE TESTING : FASTING AND PRIOR TO MEALS    FREESTYLE ARELY 14 DAY SENSOR Kit GLUCOSE TESTING 4 TIMES A DAY    insulin (LANTUS SOLOSTAR U-100 INSULIN) glargine 100 units/mL (3mL) SubQ pen Inject 25 Units into the skin 2 (two) times a day. Up to 40 bid with chemotherapy    insulin aspart U-100 (NOVOLOG) 100 unit/mL (3 mL) InPn pen Inject 10 Units into the skin before meals and at bedtime as needed (Hyperglycemia). Up to 20 BID with chemo    irbesartan (AVAPRO) 300 MG tablet Take 1 tablet (300 mg total) by mouth every evening.    lancets 30 gauge Misc Test blood sugar four times a day    liraglutide 0.6 mg/0.1 mL, 18 mg/3 mL, subq PNIJ (VICTOZA 3-TOMASZ) 0.6 mg/0.1 mL (18 mg/3 mL) PnIj pen Inject 1.8  "mg into the skin once daily.    methylphenidate HCl (RITALIN) 10 MG tablet Take 1 tablet (10 mg total) by mouth 2 (two) times daily with meals.    omega-3 fatty acids-vitamin E (FISH OIL) 1,000 mg Cap Take 1 capsule by mouth 2 (two) times daily.    oxybutynin (DITROPAN-XL) 5 MG TR24 Take 1 tablet (5 mg total) by mouth once daily.    pen needle, diabetic 32 gauge x 5/32" Ndle USE 1 PEN NEEDLE 4 TIMES DAILY (  SINGLE  USE)    sertraline (ZOLOFT) 100 MG tablet 1.5 tablet daily    SUPREP BOWEL PREP KIT 17.5-3.13-1.6 gram SolR USE AS DIRECTED    vitamin D 1000 units Tab Take 5 tablets (5,000 Units total) by mouth once daily.    QUEtiapine (SEROQUEL) 25 MG Tab Take 1 tablet (25 mg total) by mouth every evening.     Family History     Problem Relation (Age of Onset)    Cancer Father    Cataracts Mother    Diabetes Maternal Aunt    Heart disease Mother    Heart failure Mother    Hypertension Mother    Rheum arthritis Paternal Grandmother    Stroke Sister        Tobacco Use    Smoking status: Never Smoker    Smokeless tobacco: Never Used   Substance and Sexual Activity    Alcohol use: Yes     Alcohol/week: 0.0 standard drinks     Frequency: Never     Drinks per session: 1 or 2     Binge frequency: Never     Comment: only on occasion    Drug use: No    Sexual activity: Yes     Partners: Female     Review of Systems   Constitutional: Negative for activity change, fatigue, fever and unexpected weight change.   HENT: Negative for congestion, ear pain, hearing loss, rhinorrhea and sore throat.    Eyes: Negative for pain, redness and visual disturbance.   Respiratory: Negative for cough, shortness of breath and wheezing.    Cardiovascular: Positive for chest pain (left rib pain s/p fall). Negative for palpitations and leg swelling.   Gastrointestinal: Negative for abdominal pain, constipation, diarrhea, nausea and vomiting.   Genitourinary: Positive for difficulty urinating. Negative for decreased urine volume, " dysuria, frequency and urgency.   Musculoskeletal: Negative for back pain, joint swelling and neck pain.   Skin: Negative for color change, rash and wound.   Neurological: Positive for weakness (chronic gait changes). Negative for dizziness, tremors, light-headedness and headaches.     Objective:     Vital Signs (Most Recent):  Temp: 96.9 °F (36.1 °C) (10/11/20 1059)  Pulse: 76 (10/11/20 1059)  Resp: 19 (10/11/20 1059)  BP: (!) 147/81 (10/11/20 1059)  SpO2: 99 % (10/11/20 1059) Vital Signs (24h Range):  Temp:  [96.9 °F (36.1 °C)-98.9 °F (37.2 °C)] 96.9 °F (36.1 °C)  Pulse:  [66-76] 76  Resp:  [17-19] 19  SpO2:  [94 %-99 %] 99 %  BP: ()/(59-81) 147/81     Weight: 99.5 kg (219 lb 5.7 oz)  Body mass index is 31.03 kg/m².    Physical Exam  Vitals signs reviewed.   Constitutional:       General: He is not in acute distress.     Appearance: He is well-developed.   HENT:      Head: Normocephalic and atraumatic.      Right Ear: External ear normal.      Left Ear: External ear normal.      Nose: Nose normal.      Mouth/Throat:      Mouth: Mucous membranes are moist.      Pharynx: Oropharynx is clear.   Eyes:      General:         Right eye: No discharge.         Left eye: No discharge.      Extraocular Movements: Extraocular movements intact.      Conjunctiva/sclera: Conjunctivae normal.      Pupils: Pupils are equal, round, and reactive to light.   Neck:      Musculoskeletal: Neck supple.      Thyroid: No thyromegaly.   Cardiovascular:      Rate and Rhythm: Normal rate and regular rhythm.      Heart sounds: No murmur. No friction rub. No gallop.    Pulmonary:      Effort: Pulmonary effort is normal. No respiratory distress.      Breath sounds: Normal breath sounds. No wheezing or rales.   Abdominal:      General: Bowel sounds are normal. There is no distension.      Palpations: Abdomen is soft.      Tenderness: There is no abdominal tenderness. There is no right CVA tenderness, left CVA tenderness, guarding or  rebound.   Lymphadenopathy:      Cervical: No cervical adenopathy.   Skin:     General: Skin is warm and dry.   Neurological:      Mental Status: He is alert and oriented to person, place, and time. Mental status is at baseline.      Comments: Gait not assessed but PT did eval (see notes)     Psychiatric:         Behavior: Behavior normal.           CRANIAL NERVES     CN III, IV, VI   Pupils are equal, round, and reactive to light.       Significant Labs:   CBC:   Recent Labs   Lab 10/11/20  0348   WBC 10.16   HGB 12.0*   HCT 34.8*        CMP:   Recent Labs   Lab 10/11/20  0348      K 4.2      CO2 22*   *   BUN 31*   CREATININE 2.8*   CALCIUM 9.8   PROT 7.3   ALBUMIN 4.1   BILITOT 0.9   ALKPHOS 89   AST 15   ALT 16   ANIONGAP 15   EGFRNONAA 23*     Cardiac Markers: No results for input(s): CKMB, MYOGLOBIN, BNP, TROPISTAT in the last 48 hours.  Troponin:   Recent Labs   Lab 10/11/20  0348 10/11/20  0509   TROPONINI 0.031* 0.030*     Urine Culture: No results for input(s): LABURIN in the last 48 hours.  Urine Studies:   Recent Labs   Lab 10/11/20  0456   COLORU Yellow   APPEARANCEUA Cloudy*   PHUR >8.0   SPECGRAV 1.025   PROTEINUA 3+*   GLUCUA Negative   KETONESU Negative   BILIRUBINUA Negative   OCCULTUA 1+*   NITRITE Negative   UROBILINOGEN Negative   LEUKOCYTESUR 3+*   RBCUA 4   WBCUA >100*   BACTERIA Many*   HYALINECASTS 0     All pertinent labs within the past 24 hours have been reviewed.    Significant Imaging: I have reviewed all pertinent imaging results/findings within the past 24 hours.

## 2020-10-11 NOTE — ED NOTES
Pt has equal bilateral arm and leg strength, answers questions, follows commands, no slurred speech, no facial drooping. AAAOx4

## 2020-10-12 VITALS
DIASTOLIC BLOOD PRESSURE: 81 MMHG | BODY MASS INDEX: 30.71 KG/M2 | HEART RATE: 64 BPM | WEIGHT: 219.38 LBS | OXYGEN SATURATION: 97 % | SYSTOLIC BLOOD PRESSURE: 166 MMHG | TEMPERATURE: 99 F | HEIGHT: 71 IN | RESPIRATION RATE: 18 BRPM

## 2020-10-12 LAB
ALBUMIN SERPL BCP-MCNC: 3.3 G/DL (ref 3.5–5.2)
ALP SERPL-CCNC: 85 U/L (ref 55–135)
ALT SERPL W/O P-5'-P-CCNC: 14 U/L (ref 10–44)
ANION GAP SERPL CALC-SCNC: 10 MMOL/L (ref 8–16)
AST SERPL-CCNC: 15 U/L (ref 10–40)
BASOPHILS # BLD AUTO: 0.02 K/UL (ref 0–0.2)
BASOPHILS NFR BLD: 0.3 % (ref 0–1.9)
BILIRUB SERPL-MCNC: 0.7 MG/DL (ref 0.1–1)
BUN SERPL-MCNC: 22 MG/DL (ref 8–23)
CALCIUM SERPL-MCNC: 8.8 MG/DL (ref 8.7–10.5)
CHLORIDE SERPL-SCNC: 110 MMOL/L (ref 95–110)
CO2 SERPL-SCNC: 22 MMOL/L (ref 23–29)
CREAT SERPL-MCNC: 1.6 MG/DL (ref 0.5–1.4)
DIFFERENTIAL METHOD: ABNORMAL
EOSINOPHIL # BLD AUTO: 0.1 K/UL (ref 0–0.5)
EOSINOPHIL NFR BLD: 2.2 % (ref 0–8)
ERYTHROCYTE [DISTWIDTH] IN BLOOD BY AUTOMATED COUNT: 14 % (ref 11.5–14.5)
EST. GFR  (AFRICAN AMERICAN): 53 ML/MIN/1.73 M^2
EST. GFR  (NON AFRICAN AMERICAN): 45 ML/MIN/1.73 M^2
GLUCOSE SERPL-MCNC: 182 MG/DL (ref 70–110)
HCT VFR BLD AUTO: 30.1 % (ref 40–54)
HGB BLD-MCNC: 10.4 G/DL (ref 14–18)
IMM GRANULOCYTES # BLD AUTO: 0.02 K/UL (ref 0–0.04)
IMM GRANULOCYTES NFR BLD AUTO: 0.3 % (ref 0–0.5)
LYMPHOCYTES # BLD AUTO: 0.7 K/UL (ref 1–4.8)
LYMPHOCYTES NFR BLD: 11.1 % (ref 18–48)
MCH RBC QN AUTO: 29.7 PG (ref 27–31)
MCHC RBC AUTO-ENTMCNC: 34.6 G/DL (ref 32–36)
MCV RBC AUTO: 86 FL (ref 82–98)
MONOCYTES # BLD AUTO: 0.8 K/UL (ref 0.3–1)
MONOCYTES NFR BLD: 13 % (ref 4–15)
NEUTROPHILS # BLD AUTO: 4.6 K/UL (ref 1.8–7.7)
NEUTROPHILS NFR BLD: 73.1 % (ref 38–73)
NRBC BLD-RTO: 0 /100 WBC
PLATELET # BLD AUTO: 126 K/UL (ref 150–350)
PMV BLD AUTO: 9.6 FL (ref 9.2–12.9)
POCT GLUCOSE: 180 MG/DL (ref 70–110)
POCT GLUCOSE: 197 MG/DL (ref 70–110)
POTASSIUM SERPL-SCNC: 4.1 MMOL/L (ref 3.5–5.1)
PROT SERPL-MCNC: 6.3 G/DL (ref 6–8.4)
RBC # BLD AUTO: 3.5 M/UL (ref 4.6–6.2)
SODIUM SERPL-SCNC: 142 MMOL/L (ref 136–145)
TROPONIN I SERPL DL<=0.01 NG/ML-MCNC: 0.03 NG/ML (ref 0–0.03)
WBC # BLD AUTO: 6.29 K/UL (ref 3.9–12.7)

## 2020-10-12 PROCEDURE — 96361 HYDRATE IV INFUSION ADD-ON: CPT

## 2020-10-12 PROCEDURE — 96372 THER/PROPH/DIAG INJ SC/IM: CPT | Mod: 59

## 2020-10-12 PROCEDURE — 25000003 PHARM REV CODE 250: Performed by: INTERNAL MEDICINE

## 2020-10-12 PROCEDURE — 63600175 PHARM REV CODE 636 W HCPCS: Performed by: INTERNAL MEDICINE

## 2020-10-12 PROCEDURE — 25000003 PHARM REV CODE 250: Performed by: SURGERY

## 2020-10-12 PROCEDURE — 90686 IIV4 VACC NO PRSV 0.5 ML IM: CPT | Performed by: INTERNAL MEDICINE

## 2020-10-12 PROCEDURE — 80053 COMPREHEN METABOLIC PANEL: CPT

## 2020-10-12 PROCEDURE — 94760 N-INVAS EAR/PLS OXIMETRY 1: CPT

## 2020-10-12 PROCEDURE — 99217 PR OBSERVATION CARE DISCHARGE: ICD-10-PCS | Mod: ,,, | Performed by: INTERNAL MEDICINE

## 2020-10-12 PROCEDURE — 96376 TX/PRO/DX INJ SAME DRUG ADON: CPT

## 2020-10-12 PROCEDURE — 36415 COLL VENOUS BLD VENIPUNCTURE: CPT

## 2020-10-12 PROCEDURE — G0008 ADMIN INFLUENZA VIRUS VAC: HCPCS | Performed by: INTERNAL MEDICINE

## 2020-10-12 PROCEDURE — G0378 HOSPITAL OBSERVATION PER HR: HCPCS

## 2020-10-12 PROCEDURE — 99217 PR OBSERVATION CARE DISCHARGE: CPT | Mod: ,,, | Performed by: INTERNAL MEDICINE

## 2020-10-12 PROCEDURE — 90471 IMMUNIZATION ADMIN: CPT | Performed by: INTERNAL MEDICINE

## 2020-10-12 PROCEDURE — 97116 GAIT TRAINING THERAPY: CPT

## 2020-10-12 PROCEDURE — 85025 COMPLETE CBC W/AUTO DIFF WBC: CPT

## 2020-10-12 PROCEDURE — 84484 ASSAY OF TROPONIN QUANT: CPT

## 2020-10-12 RX ORDER — TRAMADOL HYDROCHLORIDE 50 MG/1
50 TABLET ORAL EVERY 6 HOURS
Qty: 12 TABLET | Refills: 0 | Status: SHIPPED | OUTPATIENT
Start: 2020-10-12 | End: 2020-10-19 | Stop reason: ALTCHOICE

## 2020-10-12 RX ORDER — CIPROFLOXACIN 500 MG/1
500 TABLET ORAL EVERY 12 HOURS
Qty: 28 TABLET | Refills: 0 | Status: SHIPPED | OUTPATIENT
Start: 2020-10-12 | End: 2020-11-05 | Stop reason: ALTCHOICE

## 2020-10-12 RX ADMIN — OXYBUTYNIN CHLORIDE 5 MG: 5 TABLET, EXTENDED RELEASE ORAL at 08:10

## 2020-10-12 RX ADMIN — DILTIAZEM HYDROCHLORIDE 240 MG: 120 CAPSULE, COATED, EXTENDED RELEASE ORAL at 08:10

## 2020-10-12 RX ADMIN — CLOPIDOGREL 75 MG: 75 TABLET, FILM COATED ORAL at 08:10

## 2020-10-12 RX ADMIN — FAMOTIDINE 40 MG: 20 TABLET, FILM COATED ORAL at 08:10

## 2020-10-12 RX ADMIN — ATORVASTATIN CALCIUM 40 MG: 40 TABLET, FILM COATED ORAL at 08:10

## 2020-10-12 RX ADMIN — CIPROFLOXACIN 400 MG: 2 INJECTION, SOLUTION INTRAVENOUS at 11:10

## 2020-10-12 RX ADMIN — ASPIRIN 81 MG: 81 TABLET, COATED ORAL at 08:10

## 2020-10-12 RX ADMIN — ATENOLOL 50 MG: 25 TABLET ORAL at 08:10

## 2020-10-12 RX ADMIN — INSULIN ASPART 8 UNITS: 100 INJECTION, SOLUTION INTRAVENOUS; SUBCUTANEOUS at 07:10

## 2020-10-12 RX ADMIN — SERTRALINE HYDROCHLORIDE 150 MG: 100 TABLET ORAL at 08:10

## 2020-10-12 RX ADMIN — INFLUENZA VIRUS VACCINE 0.5 ML: 15; 15; 15; 15 SUSPENSION INTRAMUSCULAR at 01:10

## 2020-10-12 RX ADMIN — SODIUM CHLORIDE: 0.9 INJECTION, SOLUTION INTRAVENOUS at 07:10

## 2020-10-12 RX ADMIN — INSULIN ASPART 8 UNITS: 100 INJECTION, SOLUTION INTRAVENOUS; SUBCUTANEOUS at 12:10

## 2020-10-12 NOTE — DISCHARGE INSTRUCTIONS
When Your Child Has a Urinary Tract Infection (UTI)   A urinary tract infection (UTI) is a bacterial infection in the urinary tract. The urinary tract is made up of the kidneys, ureters, bladder, and urethra. Children often get UTIs that affect the bladder. UTIs can be uncomfortable and painful. But with treatment, most children recover with no lasting effects.  What is the urinary tract?  The following body parts make up the urinary tract:     A urinary tract infection is caused by bacteria that enter the urinary tract.    · Kidneys filter waste from the blood and make urine.  · Ureters carry urine from the kidneys to the bladder.  · The bladder stores urine.  · The urethra carries urine from the bladder to the outside of the body.  What causes a urinary tract infection?  Most UTIs are caused by bacteria that enter the urinary tract through the urethra. The urinary tracts of boys and girls are slightly different. The urethra is shorter in girls. This makes it easier for bacteria to enter. As a result, girls are more likely than boys to get UTIs.  What are the symptoms of a urinary tract infection?  · If your child has a UTI affecting the bladder (cystitis), symptoms can include:  ¨ Painful urination  ¨ Frequent urination  ¨ Urgent need to urinate  ¨ Blood in the urine  ¨ Daytime wetting or nighttime bedwetting when previously continent  · If your child has a UTI affecting the kidneys (pyelonephritis), symptoms are similar to those of a bladder infection. They can also include:  ¨ Fever  ¨ Abdominal pain  ¨ Nausea and vomiting  ¨ Cloudy urine  ¨ Foul-smelling urine  How is a urinary tract infection diagnosed?  · The doctor asks about your childs symptoms and health history. Your child is examined.  · A lab test, such as a urinalysis, is done. For this test, a urine sample is needed to check for bacteria and other signs of infection. The urine is also sent for a culture, a test that identifies what bacteria is  growing in the urine. It can take 1 to 3 days to get results of a urine culture. If a UTI is suspected, the doctor will likely start treatment even before lab results come back.  · If your child has severe symptoms, other tests may be done. Youll be told more about this, if needed.  How is a urinary tract infection treated?  · Symptoms of a UTI generally go away within 24 to 72 hours of starting treatment.  · The doctor will prescribe antibiotics for your child. Make sure your child takes ALL of the medication even if he or she starts feeling better.   · You can do the following at home to relieve your childs symptoms:  ¨ Give your child over-the-counter (OTC) medications, such as ibuprofen or acetaminophen, to manage pain and fever. Do not give ibuprofen to an infant who is less than 6 months of age, or to a child who is dehydrated or constantly vomiting. Do not give aspirin to a child with a fever. This can put your child at risk of a serious illness called Reyes syndrome.  ¨ Ask your doctor about other medications that can be prescribed to relieve painful urination.  ¨ Give your child plenty of fluids to drink. Cranberry juice may help relieve some pain symptoms.  When you should call your healthcare provider  Call the doctor if your child has any of the following:  · Symptoms that do not improve within 48 hours of starting treatment  · Fever (see Fever and children, below)  · A fever that goes away but returns after starting treatment  · Increased abdominal or back pain  · Signs of dehydration (very dark or little urine, excessive thirst, dry mouth, dizziness)  · Vomiting or inability to tolerate prescribed antibiotics  · Child begins acting sicker  · If a urine culture was done, make sure to get the results from the healthcare provider. Make an appointment to follow up about a week after your child has finished antibiotics.  Fever and children  Always use a digital thermometer to check your childs  temperature. Never use a mercury thermometer.  For infants and toddlers, be sure to use a rectal thermometer correctly. A rectal thermometer may accidentally poke a hole in (perforate) the rectum. It may also pass on germs from the stool. Always follow the product makers directions for proper use. If you dont feel comfortable taking a rectal temperature, use another method. When you talk to your childs healthcare provider, tell him or her which method you used to take your childs temperature.  Here are guidelines for fever temperature. Ear temperatures arent accurate before 6 months of age. Dont take an oral temperature until your child is at least 4 years old.  Infant under 3 months old:  · Ask your childs healthcare provider how you should take the temperature.  · Rectal or forehead (temporal artery) temperature of 100.4°F (38°C) or higher, or as directed by the provider  · Armpit temperature of 99°F (37.2°C) or higher, or as directed by the provider  Child age 3 to 36 months:  · Rectal, forehead (temporal artery), or ear temperature of 102°F (38.9°C) or higher, or as directed by the provider  · Armpit temperature of 101°F (38.3°C) or higher, or as directed by the provider  Child of any age:  · Repeated temperature of 104°F (40°C) or higher, or as directed by the provider  · Fever that lasts more than 24 hours in a child under 2 years old. Or a fever that lasts for 3 days in a child 2 years or older.      How is a urinary tract infection prevented?  · Encourage your child to drink plenty of fluids.  · Encourage your child to empty the bladder all the way when urinating.  · Teach girls to wipe from the front to back when using the bathroom.  · Don't use bubble bath.  · Don't allow your child to become constipated.  · If your child has a UTI, he or she may need ultrasound imaging of the kidneys and bladder. This helps the doctor rule out possible anatomical problems that could cause a UTI. If problems are  found, or if your child has recurrent UTIs, additional imaging tests may be helpful.  Date Last Reviewed: 1/1/2017 © 2000-2017 [employer]. [address] [place] [address], [place] [address]. All rights reserved. This information is not intended as a substitute for professional medical care. Always follow your healthcare professional's instructions.        Bladder Infection, Male (Adult)    You have a bladder infection.  Urine is normally free of bacteria. But bacteria can get into the urinary tract from the skin around the rectum or it may travel in the blood from elsewhere in the body.  This is called a urinary tract infection (UTI). An infection can occur anywhere in the urinary tract. It could be in a kidney (pyelonephritis)or in the bladder (cystitis) and urethra (urethritis). The urethra is the tube that drains the urine from the bladder through the tip of the penis.  The most common place for a UTI is in the bladder. This is called a bladder infection. Most bladder infections are easily treated. They are not serious unless the infection spreads up to the kidney.  The terms bladder infection, UTI, and cystitis are often used to describe the same thing, but they arent always the same. Cystitis is an inflammation of the bladder. The most common cause of cystitis is an infection.   Keep in mind:  · Infections in the urine are called UTIs.  · Cystitis is usually caused by a UTI.  · Not all UTIs and cases of cystitis are bladder infections.  · Bladder infections are the most common type of cystitis.  Symptoms of a bladder infection  The infection causes inflammation in the urethra and bladder. This inflammation causes many of the symptoms. The most common symptoms of a bladder infection are:  · Pain or burning when urinating  · Having to go more often than usual  · Feeling like you need to go right away  · Only a small amount comes out  · Blood in urine  · Discomfort in your belly (abdomen), usually in the  lower abdomen, above the pubic bone  · Cloudy, strong, or bad smelling urine  · Unable to urinate (retention)  · Urinary incontinence  · Fever  · Loss of appetite  Older adults may also feel confused.  Causes of a bladder infection  Bladder infections are not contagious. You can't get one from someone else, from a toilet seat, or from sharing a bath.  The most common cause of bladder infections is bacteria from the bowels. The bacteria get onto the skin around the opening of the urethra. From there they can get into the urine and travel up to the bladder. This causes inflammation and an infection. This usually happens because of:  · An enlarged prostate  · Poor cleaning of the genitals  · Procedures that put a tube in your bladder, like a Toro catheter  · Bowel incontinence  · Older age  · Not emptying your bladder (The urine stays there, giving the bacteria a chance to grow.)  · Dehydration (This allows urine to stay in the bladder longer.)  · Constipation (This can cause the bowels to push on the bladder or urethra and keep the bladder from emptying.)  Treatment  Bladder infections are treated with antibiotics. They usually clear up quickly without complications. Treatment helps prevent a more serious kidney infection.  Medicines  Medicines can help in the treatment of a bladder infection:  · You may have been given phenazopyridine to ease burning when you urinate. It will cause your urine to be bright orange. It can stain clothing.  · You may have been prescribed antibiotics. Take this medicine until you have finished it, even if you feel better. Taking all of the medicine will make sure the infection has cleared.  You can use acetaminophen or ibuprofen for pain, fever, or discomfort, unless another medicine was prescribed. You can also alternate them, or use both together. They work differently and are a different class of medicines, so taking them together is not an overdose. If you have chronic liver or  kidney disease, talk with your healthcare provider before using these medicines. Also talk with your provider if youve had a stomach ulcer or GI bleeding or are taking blood thinner medicines.  Home care  Here are some guidelines to help you care for yourself at home:  · Drink plenty of fluids, unless your healthcare provider told you not to. Fluids will prevent dehydration and flush out your bladder.  · Use good personal hygiene. Wipe from front to back after using the toilet, and clean your penis regularly. If you arent circumcised, retract the foreskin when cleaning.  · Urinate more frequently, and dont try to hold it in for long periods of time, if possible.  · Wear loose-fitting clothes and cotton underwear. Avoid tight-fitting pants. This helps keep you clean and dry.  · Change your diet to prevent constipation. This means eating more fresh foods and more fiber, and less junk and fatty foods.  · Avoid sex until your symptoms are gone.  · Avoid caffeine, alcohol, and spicy foods. These can irritate the bladder.  Follow-up care  Follow up with your healthcare provider, or as advised if all symptoms have not cleared up within 5 days. It is important to keep your follow-up appointment. You can talk with your provider to see if you need more tests of the urinary tract. This is especially important if you have infections that keep coming back.  If a culture was done, you will be told if your treatment needs to be changed. If directed, you can call to find out the results.  If X-rays were taken, you will be told of any findings that may affect your care.  Call 911  Call 911 if any of these occur:  · Trouble breathing  · Difficulty waking up  · Feeling confused  · Fainting or loss of consciousness  · Rapid heart rate  When to seek medical advice  Call your healthcare provider right away if any of these occur:  · Fever of 100.4ºF (38ºC) or higher, or as directed by your healthcare provider  · Your symptoms dont  improve after 2 days of treatment  · Back or abdominal pain that gets worse  · Repeated vomiting, or you arent able to keep medicine down  · Weakness or dizziness  Date Last Reviewed: 10/1/2016  © 0938-8295 [employer]. [address] [place] [address], [place] [address]. All rights reserved. This information is not intended as a substitute for professional medical care. Always follow your healthcare professional's instructions.

## 2020-10-12 NOTE — PT/OT/SLP PROGRESS
"Physical Therapy Treatment    Patient Name:  Bessy Nunez   MRN:  [id number]    Recommendations:     Discharge Recommendations:  home health PT, home with home health   Discharge Equipment Recommendations: none   Barriers to discharge: Decreased caregiver support    Assessment:     Bessy Nunez is a 60 y.o. male admitted with a medical diagnosis of Acute metabolic encephalopathy.  He presents with the following impairments/functional limitations:  weakness, gait instability, impaired functional mobilty, impaired balance, impaired self care skills, decreased safety awareness. Patient has shown good progress  and meeting PT goals established. Performed out of bed and transfers with supervision/independent. Increased gait distance with no assistive device following IV pole to ~250 feet with Supervision. Performed ascending/descending 8 stair steps using bilat handrails with supervision. No sign of fatigue.     Rehab Prognosis: Good; patient would benefit from acute skilled PT services to address these deficits and reach maximum level of function.    Recent Surgery: * No surgery found *      Plan:     During this hospitalization, patient to be seen 5 x/week to address the identified rehab impairments via gait training, therapeutic activities, therapeutic exercises and progress toward the following goals:    · Plan of Care Expires:  10/16/20    Subjective     Chief Complaint: no pain  complain today.  Patient/Family Comments/goals: "To return home today."  Pain/Comfort:  · Pain Rating 1: 0/10  · Pain Rating Post-Intervention 1: 0/10      Objective:     Communicated with patient  prior to session.  Patient found supine with telemetry, peripheral IV upon PT entry to room.     General Precautions: Standard, fall   Orthopedic Precautions:N/A   Braces: N/A     Functional Mobility:  · Bed Mobility:     · Rolling Left:  independence  · Rolling Right: independence  · Scooting: independence  · Supine to Sit: " supervision/independent  · Sit to Supine: supervision/independent  · Transfers:     · Sit to Stand:  supervision/independent with no AD  · Bed to Chair: supervision/independent with  no AD  using  Step Transfer  · Gait: Supervision x ~250 feet with no assistive device. Reciprocal gait and slight trunk lateral  sway at times.  · Balance: Stand Static: Good; Dynamic: Good-  · Stairs:  Pt ascended/descended 8 steps stair(s) with No Assistive Device with bilateral handrails with Supervision or Set-up Assistance and step through pattern.       AM-PAC 6 CLICK MOBILITY  Turning over in bed (including adjusting bedclothes, sheets and blankets)?: 4  Sitting down on and standing up from a chair with arms (e.g., wheelchair, bedside commode, etc.): 4  Moving from lying on back to sitting on the side of the bed?: 4  Moving to and from a bed to a chair (including a wheelchair)?: 4  Need to walk in hospital room?: 4  Climbing 3-5 steps with a railing?: 3  Basic Mobility Total Score: 23       Therapeutic Activities and Exercises:   Gait trng no assisitve device x ~250 ft with Supervision; Stairs mgt with Supervision    Patient left up in chair with all lines intact, call button in reach and nursing notified..    GOALS:   Multidisciplinary Problems     Physical Therapy Goals        Problem: Physical Therapy Goal    Goal Priority Disciplines Outcome Goal Variances Interventions   Physical Therapy Goal     PT, PT/OT Ongoing, Progressing     Description: Goals to be met by: 10/16/20    Patient will increase functional independence with mobility by performin. Supine to sit with Independent  2. Sit to supine with Independent  3. Bed to chair transfer with Independentwith or without none using Stand Pivot TECHNIQUE  4. Gait  x ~250  feet with Supervision or Set-up Assistance with or without none  5. Lower extremity exercise program x10 reps per handout, with assistance as needed                      Time Tracking:     PT Received  On: 10/12/20  PT Start Time: 1245     PT Stop Time: 1303  PT Total Time (min): 18 min     Billable Minutes: Gait Training 18    Treatment Type: Treatment  PT/PTA: PT           Jcarlos Sanchez, PT  10/12/2020

## 2020-10-12 NOTE — PLAN OF CARE
10/12/20 1157   Post-Acute Status   Post-Acute Authorization HME   HME Status Referrals Sent   Discharge Delays None known at this time       Wheelchair order received. Faxed Medical Necessity Form and supporting documentation to [facility]. Awaiting response.     Maryjo Ayala LMSW

## 2020-10-12 NOTE — HOSPITAL COURSE
Pt admitted yesterday on Cipro for UTI (urine culture in process; rocephin 1 dose in ER)) as well as NS at 125ml/hr IVF for dehydration (s/p 1L NS bolus in ER). VSS/afebrile. Creat has imprpved to baseline 2.8>1.6. No leukocytosis.     No pain meds for rib fracture. PT started yesterday for mobility assessment   · :Sit to Stand:  stand by assistance with no AD  · Bed to Chair: stand by assistance with  no AD  using  Stand Pivot and Step Transfer  Gait: Standby Assistance x ~ 100 feet with no AD. Slight unsteady gait during changing directions  Goal is 250ft with supervision

## 2020-10-12 NOTE — PLAN OF CARE
Mr Nunez is here with acute encephalopathy. He lives with his wife and will return home at discharge. He will receive home health services including PT. Discharge brochure provided and CM contact information given. Mr Nunez is able to repeat symptoms requiring a return to the ED. He repeats, SOB, frequent falls, increased forgetfulness, fever, nausea or vomiting.

## 2020-10-12 NOTE — PROGRESS NOTES
[facility] Hospital Medicine  Progress Note    Patient Name: Bessy Nunez  MRN: [id number]  Patient Class: OP- Observation   Admission Date: 10/11/2020  Length of Stay: 1 days  Attending Physician: Ivett Orr MD  Primary Care Provider: Edgar Nova MD        Subjective:     Principal Problem:Acute metabolic encephalopathy        HPI:  Patient presented to ER with generalized weakness and a fall at home. States he was sweeping cat food off the floor last night and went to push a chair out of the way, didn't realize it was on wheels, and feel onto his left side. In ER was found to have left rib fracture---CT report not yet completed and possible stranding around the kidneys. He denies dysuria, hematuria, frequency, flank pain or fevers. He has BPH sx of incomplete bladder emptying and straining for quite some time. He has h/o CVA and CNS vasculitis with chronic gait abnormalities. This morning he is alert, oriented and denies any memory changes, new onset weakness or gait changes. However, per ER, he was confused on arrival and wife reported new onset shuffling gait. telestroke initiated as wife was concerned for new CVA with these changes. Telestroke diagnosed as acute encephalopathy---no recs for MRI per notes or further TIA/CVA work up.     Labs show no leukocytosis. Cr 2.8 from 1.3-1.5 baseline. Lactic acid normal. Not hypotensive. Urine >100 WBC, cloudy and many bacteria; no casts. Admitted for UTI (possible pyleo pending final CT read), dehydration and metabolic encephalopathy to be treated with IVF, IV antibx. Mental status already seems to be improving.     Overview/Hospital Course:  Pt admitted yesterday on Cipro for UTI (urine culture in process; rocephin 1 dose in ER)) as well as NS at 125ml/hr IVF for dehydration (s/p 1L NS bolus in ER). VSS/afebrile. Creat has imprpved to baseline 2.8>1.6. No leukocytosis.     No pain meds for rib fracture. PT started yesterday for mobility  assessment   · :Sit to Stand:  stand by assistance with no AD  · Bed to Chair: stand by assistance with  no AD  using  Stand Pivot and Step Transfer  Gait: Standby Assistance x ~ 100 feet with no AD. Slight unsteady gait during changing directions  Goal is 250ft with supervision      Review of Systems   Constitutional: Negative for activity change, fatigue, fever and unexpected weight change.   HENT: Negative for congestion, ear pain, hearing loss, rhinorrhea and sore throat.    Eyes: Negative for pain, redness and visual disturbance.   Respiratory: Negative for cough, shortness of breath and wheezing.    Cardiovascular: Positive for chest pain (left rib pain s/p fall). Negative for palpitations and leg swelling.   Gastrointestinal: Negative for abdominal pain, constipation, diarrhea, nausea and vomiting.   Genitourinary: Positive for difficulty urinating. Negative for decreased urine volume, dysuria, frequency and urgency.   Musculoskeletal: Negative for back pain, joint swelling and neck pain.   Skin: Negative for color change, rash and wound.   Neurological: Positive for weakness (chronic gait changes). Negative for dizziness, tremors, light-headedness and headaches.     Objective:     Vital Signs (Most Recent):  Temp: 97.5 °F (36.4 °C) (10/12/20 0431)  Pulse: 76 (10/12/20 0431)  Resp: 20 (10/12/20 0431)  BP: (!) 143/89 (10/12/20 0431)  SpO2: 97 % (10/12/20 0713) Vital Signs (24h Range):  Temp:  [96.9 °F (36.1 °C)-98.9 °F (37.2 °C)] 97.5 °F (36.4 °C)  Pulse:  [66-95] 76  Resp:  [17-20] 20  SpO2:  [96 %-99 %] 97 %  BP: (132-157)/(68-89) 143/89     Weight: 99.5 kg (219 lb 5.7 oz)  Body mass index is 31.03 kg/m².    Physical Exam  Vitals signs and nursing note reviewed.   Constitutional:       General: He is not in acute distress.     Appearance: He is well-developed.   HENT:      Head: Normocephalic and atraumatic.      Right Ear: External ear normal.      Left Ear: External ear normal.      Nose: Nose normal.       Mouth/Throat:      Mouth: Mucous membranes are moist.      Pharynx: Oropharynx is clear.   Eyes:      General:         Right eye: No discharge.         Left eye: No discharge.      Extraocular Movements: Extraocular movements intact.      Conjunctiva/sclera: Conjunctivae normal.      Pupils: Pupils are equal, round, and reactive to light.   Neck:      Musculoskeletal: Neck supple.      Thyroid: No thyromegaly.   Cardiovascular:      Rate and Rhythm: Normal rate and regular rhythm.      Heart sounds: No murmur. No friction rub. No gallop.    Pulmonary:      Effort: Pulmonary effort is normal. No respiratory distress.      Breath sounds: Normal breath sounds. No wheezing or rales.   Chest:      Chest wall: Tenderness (left lower rib) present.   Abdominal:      General: Bowel sounds are normal. There is no distension.      Palpations: Abdomen is soft.      Tenderness: There is no abdominal tenderness. There is no right CVA tenderness, left CVA tenderness, guarding or rebound.   Lymphadenopathy:      Cervical: No cervical adenopathy.   Skin:     General: Skin is warm and dry.   Neurological:      Mental Status: He is alert and oriented to person, place, and time. Mental status is at baseline.      Comments: Gait not assessed but PT did eval (see notes)     Psychiatric:         Behavior: Behavior normal.           CRANIAL NERVES     CN III, IV, VI   Pupils are equal, round, and reactive to light.       Significant Labs:   CBC:   Recent Labs   Lab 10/11/20  0348 10/12/20  0523   WBC 10.16 6.29   HGB 12.0* 10.4*   HCT 34.8* 30.1*    126*     CMP:   Recent Labs   Lab 10/11/20  0348 10/12/20  0523    142   K 4.2 4.1    110   CO2 22* 22*   * 182*   BUN 31* 22   CREATININE 2.8* 1.6*   CALCIUM 9.8 8.8   PROT 7.3 6.3   ALBUMIN 4.1 3.3*   BILITOT 0.9 0.7   ALKPHOS 89 85   AST 15 15   ALT 16 14   ANIONGAP 15 10   EGFRNONAA 23* 45*     Troponin:   Recent Labs   Lab 10/11/20  1354 10/11/20  1955  10/12/20  0207   TROPONINI 0.022 0.026 0.025     10/11/2020  Lactic acid 1.6  INR 1.0  Covid screen negative     Urine Studies:   Recent Labs   Lab 10/11/20  0456   COLORU Yellow   APPEARANCEUA Cloudy*   PHUR >8.0   SPECGRAV 1.025   PROTEINUA 3+*   GLUCUA Negative   KETONESU Negative   BILIRUBINUA Negative   OCCULTUA 1+*   NITRITE Negative   UROBILINOGEN Negative   LEUKOCYTESUR 3+*   RBCUA 4   WBCUA >100*   BACTERIA Many*   HYALINECASTS 0   urine culture in process  Blood cultures x 2 NGTD    Significant Imaging:     CT head   1. No evidence of an acute intracranial abnormality or significant interval detrimental change as compared to the prior study.  Clinical correlation further evaluation with MRI, as warranted.  2. Generalized cerebral volume loss with confluent and patchy regions of supratentorial white matter hypoattenuation most compatible with a significant degree of chronic microvascular change, with chronic bilateral thalami, basal ganglia, and cerebellar infarcts again noted.  The preliminary and final reports are concordant    Ct chest abd and pelvis    1. Acute, mildly displaced lateral left 9th rib fracture.  No additional acute fracture or evidence of acute traumatic injury in the chest, abdomen or pelvis.  2. Cholelithiasis without CT evidence of acute cholecystitis.  3. Nonobstructive right nephrolithiasis.  Compared to urinary bladder wall thickening, which may be secondary to incomplete distension, chronic outflow obstruction, cystitis, or neurogenic bladder.  4. Diverticulosis coli without evidence of diverticulitis.    EKG   Normal sinus rhythm   Nonspecific T wave abnormality   Abnormal ECG   When compared with ECG of 30-JUL-2020 12:48,   CA interval has decreased   Confirmed by TABATHA SANTOS, Adis ([id number]) on 10/11/2020 10:09:      Assessment/Plan:      * Acute metabolic encephalopathy  telestroke initiated in ER--thought to be metabolic and not TIA/CVA  + UA and per ER report CT showing renal  stranding conerning for pyelo--start cipro  MANISH with elevated Cr, likely mild dehydration--start IVF  He already seems to be improved this AM  Physical therapy started  Family not at bedside to confirm if he is back to baseline but he is alert and oriented this morning and able to provide history    Pt remains awake and alert. At baseline and stable for d/c home with po antibx for UTI    Acute renal insufficiency  Likely due to dehydartion, UTI  Start IVF  Daily labs  Avoid nephrotoxic agents  Renal function back to his baseline    Closed fracture of one rib of left side with routine healing  New problem  S/p fall at home  PRN [place], watch for confusion  No NSAIDs due to renal fxn  He has not required any prn meds and did well with pT    Home with home health and PT  Tramadol prn pain    Dementia with behavioral disturbance  Cont home aricept and zoloft  Seems to be at baseline      Osteoporosis with current pathological fracture with routine healing from steroids; compression fx 2017; 4/2019 bisphosphonate  On alendronate at home, weekly      History of stroke 9/2017 L thalamus; 7/2020 R thalamus; felt to be due to small vessel disease, not vasculitis  Cont asa, plavix and statin  Good BP and gluose control  telestroke--no new events  CT head reviewed    Impaired functional mobility, balance, gait, and endurance  Chronic issues with gait and balance  Start PT here, may need home health with PT at discharge again  May also improve as we treat infection and dehydration  PT goal;s 250ft without assistance  10/11 Sit to Stand:  stand by assistance with no AD  · Bed to Chair: stand by assistance with  no AD  using  Stand Pivot and Step Transfer  Gait: Standby Assistance x ~ 100 feet with no AD. Slight unsteady gait during changing directions.    Home with home health/PT    CNS vasculitis failed imuran; failed cytoxan; 1/2020 rituxan  History of  No acute neurologic changes to warrant repeat MRI at this time  CT head  reviewed      Type 2 diabetes mellitus with diabetic polyneuropathy, with long-term current use of insulin  Hold home PO meds/victoza  Levemir 18 units and aspart 8 units TID with meals +SSI  Diabetic diet  accucheck  Adjust insulin as needed  -245- resume home meds upoon d/c  Lab Results   Component Value Date    HGBA1C 7.2 (H) 07/30/2020         NAFLD (nonalcoholic fatty liver disease) 6/12/2015 liver biopsy showing advanced stage fibrosis with bridging fibrosis and scattered nodules.  Noted  LFTs normal, follow up PCP      Essential hypertension 03/09/2020 TTE concentric LV remodeling.  Normal LV systolic function LVEF 55%.  Grade 2 diastolic dysfunction.  Normal CVP.  PA pressure 20.  Cont home meds of atenolol, diltiazem,--irbesartan held due to MANISH  -150s, will resume ARB upon d/c renal fucntion back to baseline  Want good BP control with h/o CVA and no evidence of new TIA/CVA so no need for permissive HTN at this point      Mixed hyperlipidemia  Cont atorvastatin      VTE Risk Mitigation (From admission, onward)         Ordered     IP VTE HIGH RISK PATIENT  Once      10/11/20 0814     Place sequential compression device  Until discontinued      10/11/20 0814     Place sequential compression device  Until discontinued      10/11/20 0532                        Ivett Orr MD  Department of Hospital Medicine   [facility]

## 2020-10-12 NOTE — ASSESSMENT & PLAN NOTE
telestroke initiated in ER--thought to be metabolic and not TIA/CVA  + UA and per ER report CT showing renal stranding conerning for pyelo--start cipro  MANISH with elevated Cr, likely mild dehydration--start IVF  He already seems to be improved this AM  Physical therapy started  Family not at bedside to confirm if he is back to baseline but he is alert and oriented this morning and able to provide history    Pt remains awake and alert. At baseline and stable for d/c home with po antibx for UTI

## 2020-10-12 NOTE — ASSESSMENT & PLAN NOTE
Chronic issues with gait and balance  Start PT here, may need home health with PT at discharge again  May also improve as we treat infection and dehydration  PT goal;s 250ft without assistance  10/11 Sit to Stand:  stand by assistance with no AD  · Bed to Chair: stand by assistance with  no AD  using  Stand Pivot and Step Transfer  Gait: Standby Assistance x ~ 100 feet with no AD. Slight unsteady gait during changing directions.    Home with home health/PT

## 2020-10-12 NOTE — SUBJECTIVE & OBJECTIVE
Review of Systems   Constitutional: Negative for activity change, fatigue, fever and unexpected weight change.   HENT: Negative for congestion, ear pain, hearing loss, rhinorrhea and sore throat.    Eyes: Negative for pain, redness and visual disturbance.   Respiratory: Negative for cough, shortness of breath and wheezing.    Cardiovascular: Positive for chest pain (left rib pain s/p fall). Negative for palpitations and leg swelling.   Gastrointestinal: Negative for abdominal pain, constipation, diarrhea, nausea and vomiting.   Genitourinary: Positive for difficulty urinating. Negative for decreased urine volume, dysuria, frequency and urgency.   Musculoskeletal: Negative for back pain, joint swelling and neck pain.   Skin: Negative for color change, rash and wound.   Neurological: Positive for weakness (chronic gait changes). Negative for dizziness, tremors, light-headedness and headaches.     Objective:     Vital Signs (Most Recent):  Temp: 97.5 °F (36.4 °C) (10/12/20 0431)  Pulse: 76 (10/12/20 0431)  Resp: 20 (10/12/20 0431)  BP: (!) 143/89 (10/12/20 0431)  SpO2: 97 % (10/12/20 0713) Vital Signs (24h Range):  Temp:  [96.9 °F (36.1 °C)-98.9 °F (37.2 °C)] 97.5 °F (36.4 °C)  Pulse:  [66-95] 76  Resp:  [17-20] 20  SpO2:  [96 %-99 %] 97 %  BP: (132-157)/(68-89) 143/89     Weight: 99.5 kg (219 lb 5.7 oz)  Body mass index is 31.03 kg/m².    Physical Exam  Vitals signs and nursing note reviewed.   Constitutional:       General: He is not in acute distress.     Appearance: He is well-developed.   HENT:      Head: Normocephalic and atraumatic.      Right Ear: External ear normal.      Left Ear: External ear normal.      Nose: Nose normal.      Mouth/Throat:      Mouth: Mucous membranes are moist.      Pharynx: Oropharynx is clear.   Eyes:      General:         Right eye: No discharge.         Left eye: No discharge.      Extraocular Movements: Extraocular movements intact.      Conjunctiva/sclera: Conjunctivae normal.       Pupils: Pupils are equal, round, and reactive to light.   Neck:      Musculoskeletal: Neck supple.      Thyroid: No thyromegaly.   Cardiovascular:      Rate and Rhythm: Normal rate and regular rhythm.      Heart sounds: No murmur. No friction rub. No gallop.    Pulmonary:      Effort: Pulmonary effort is normal. No respiratory distress.      Breath sounds: Normal breath sounds. No wheezing or rales.   Chest:      Chest wall: Tenderness (left lower rib) present.   Abdominal:      General: Bowel sounds are normal. There is no distension.      Palpations: Abdomen is soft.      Tenderness: There is no abdominal tenderness. There is no right CVA tenderness, left CVA tenderness, guarding or rebound.   Lymphadenopathy:      Cervical: No cervical adenopathy.   Skin:     General: Skin is warm and dry.   Neurological:      Mental Status: He is alert and oriented to person, place, and time. Mental status is at baseline.      Comments: Gait not assessed but PT did eval (see notes)     Psychiatric:         Behavior: Behavior normal.           CRANIAL NERVES     CN III, IV, VI   Pupils are equal, round, and reactive to light.       Significant Labs:   CBC:   Recent Labs   Lab 10/11/20  0348 10/12/20  0523   WBC 10.16 6.29   HGB 12.0* 10.4*   HCT 34.8* 30.1*    126*     CMP:   Recent Labs   Lab 10/11/20  0348 10/12/20  0523    142   K 4.2 4.1    110   CO2 22* 22*   * 182*   BUN 31* 22   CREATININE 2.8* 1.6*   CALCIUM 9.8 8.8   PROT 7.3 6.3   ALBUMIN 4.1 3.3*   BILITOT 0.9 0.7   ALKPHOS 89 85   AST 15 15   ALT 16 14   ANIONGAP 15 10   EGFRNONAA 23* 45*     Troponin:   Recent Labs   Lab 10/11/20  1354 10/11/20  1955 10/12/20  0207   TROPONINI 0.022 0.026 0.025     10/11/2020  Lactic acid 1.6  INR 1.0  Covid screen negative     Urine Studies:   Recent Labs   Lab 10/11/20  0456   COLORU Yellow   APPEARANCEUA Cloudy*   PHUR >8.0   SPECGRAV 1.025   PROTEINUA 3+*   GLUCUA Negative   KETONESU Negative    BILIRUBINUA Negative   OCCULTUA 1+*   NITRITE Negative   UROBILINOGEN Negative   LEUKOCYTESUR 3+*   RBCUA 4   WBCUA >100*   BACTERIA Many*   HYALINECASTS 0   urine culture in process  Blood cultures x 2 NGTD    Significant Imaging:     CT head   1. No evidence of an acute intracranial abnormality or significant interval detrimental change as compared to the prior study.  Clinical correlation further evaluation with MRI, as warranted.  2. Generalized cerebral volume loss with confluent and patchy regions of supratentorial white matter hypoattenuation most compatible with a significant degree of chronic microvascular change, with chronic bilateral thalami, basal ganglia, and cerebellar infarcts again noted.  The preliminary and final reports are concordant    Ct chest abd and pelvis    1. Acute, mildly displaced lateral left 9th rib fracture.  No additional acute fracture or evidence of acute traumatic injury in the chest, abdomen or pelvis.  2. Cholelithiasis without CT evidence of acute cholecystitis.  3. Nonobstructive right nephrolithiasis.  Compared to urinary bladder wall thickening, which may be secondary to incomplete distension, chronic outflow obstruction, cystitis, or neurogenic bladder.  4. Diverticulosis coli without evidence of diverticulitis.    EKG   Normal sinus rhythm   Nonspecific T wave abnormality   Abnormal ECG   When compared with ECG of 30-JUL-2020 12:48,   TN interval has decreased   Confirmed by Adis MAYS MD ([id number]) on 10/11/2020 10:09:

## 2020-10-12 NOTE — PLAN OF CARE
Problem: Physical Therapy Goal  Goal: Physical Therapy Goal  Description: Goals to be met by: 10/16/20    Patient will increase functional independence with mobility by performin. Supine to sit with Independent  2. Sit to supine with Independent  3. Bed to chair transfer with Independentwith or without none using Stand Pivot TECHNIQUE  4. Gait  x ~250  feet with Supervision or Set-up Assistance with or without none - met 10/12/20  5. Lower extremity exercise program x10 reps per handout, with assistance as needed     Outcome: Ongoing, Progressing

## 2020-10-12 NOTE — PT/OT/SLP DISCHARGE
Physical Therapy Discharge Summary    Name: Bessy Nunez  MRN: [id number]   Principal Problem: Acute metabolic encephalopathy     Patient Discharged from acute Physical Therapy on 10/12/20.  Please refer to prior PT noted date on 10/12/20 for functional status.     Assessment:     Patient was discharged unexpectedly.  Information required to complete an accurate discharge summary is unknown.  Refer to therapy initial evaluation and last progress note for initial and most recent functional status and goal achievement.  Recommendations made may be found in medical record.    Objective:     GOALS:   Multidisciplinary Problems     Physical Therapy Goals        Problem: Physical Therapy Goal    Goal Priority Disciplines Outcome Goal Variances Interventions   Physical Therapy Goal     PT, PT/OT Ongoing, Progressing     Description: Goals to be met by: 10/16/20    Patient will increase functional independence with mobility by performin. Supine to sit with Independent  2. Sit to supine with Independent  3. Bed to chair transfer with Independentwith or without none using Stand Pivot TECHNIQUE  4. Gait  x ~250  feet with Supervision or Set-up Assistance with or without none  5. Lower extremity exercise program x10 reps per handout, with assistance as needed                      Reasons for Discontinuation of Therapy Services  Transfer to alternate level of care. and Satisfactory goal achievement.      Plan:     Patient Discharged to: Home with Home Health Service.    Jcarlos Sanchez, PT  10/12/2020

## 2020-10-12 NOTE — PLAN OF CARE
Problem: Adult Inpatient Plan of Care  Goal: Plan of Care Review  Outcome: Ongoing, Progressing     Problem: Fall Injury Risk  Goal: Absence of Fall and Fall-Related Injury  Outcome: Ongoing, Progressing     Problem: Adult Inpatient Plan of Care  Goal: Absence of Hospital-Acquired Illness or Injury  Outcome: Ongoing, Progressing

## 2020-10-12 NOTE — ASSESSMENT & PLAN NOTE
Likely due to dehydartion, UTI  Start IVF  Daily labs  Avoid nephrotoxic agents  Renal function back to his baseline

## 2020-10-12 NOTE — PROGRESS NOTES
Nursing Notes  Bedside hand off report received from DIANA Clifton. Pt lying in bed. Oriented, but forgetful. No signs of distress noted. Pt calm and cooperative. Fluids infusing per MD order. Will continue to monitor.       Huddle Comments

## 2020-10-12 NOTE — PLAN OF CARE
Pt d/c home with po abx and prn tramadol for rib pain. D/c paperwork reviewed with patient and wife. All questions answered. Iv removed, tele d/c.     Both pt and wife state that his phone was in the bed with him this morning and now they cannot find it. Room searched, unable to find phone. Also, PCT and I searched through blue linen bags downstairs and could not locate it. Attempted to call phone and it goes straight to voicemail.    No one has seen the phone in the room. Pts wife will double check for phone at home when she gets there.

## 2020-10-12 NOTE — ASSESSMENT & PLAN NOTE
Cont home meds of atenolol, diltiazem,--irbesartan held due to MANISH  -150s, will resume ARB upon d/c renal fucntion back to baseline  Want good BP control with h/o CVA and no evidence of new TIA/CVA so no need for permissive HTN at this point

## 2020-10-12 NOTE — ASSESSMENT & PLAN NOTE
New problem  S/p fall at home  PRN [place], watch for confusion  No NSAIDs due to renal fxn  He has not required any prn meds and did well with pT    Home with home health and PT  Tramadol prn pain

## 2020-10-12 NOTE — ASSESSMENT & PLAN NOTE
Hold home PO meds/victoza  Levemir 18 units and aspart 8 units TID with meals +SSI  Diabetic diet  accucheck  Adjust insulin as needed  -245- resume home meds upoon d/c  Lab Results   Component Value Date    HGBA1C 7.2 (H) 07/30/2020

## 2020-10-12 NOTE — PLAN OF CARE
10/12/20 1249   Post-Acute Status   Post-Acute Authorization Home Health   Home Health Status Referrals Sent   Discharge Delays None known at this time       Home Health order received. Faxed Medical Necessity Form and supporting documentation to [employer]. Awaiting response.     Maryjo Ayala LMSW

## 2020-10-12 NOTE — PLAN OF CARE
10/12/20 1452   Post-Acute Status   Post-Acute Authorization HME;Home Health   HME Status Pending Payor Review   Home Health Status Pending Payor Review   Discharge Delays None known at this time       Contacted [employer] to inquire about referral sent for home health and a wheelchair. Spoke with Corky who informs that the wheelchair request is under review and the home health order is in process. No decisions at this time. Awaiting decision.     Maryjo Ayala LMSW

## 2020-10-12 NOTE — NURSING
Pharmacy does not carry pts methylphenidate. Wife at bedside and asked to bring in. She states that she will bring it in.

## 2020-10-12 NOTE — ASSESSMENT & PLAN NOTE
New problem  S/p fall at home  PRN [place], watch for confusion  No NSAIDs due to renal fxn  He has not required any prn meds and did well with pT    Home with home health and PT  Tramadol prn pain

## 2020-10-12 NOTE — PLAN OF CARE
10/12/20 1136   Post-Acute Status   Post-Acute Authorization HME;Home Health   HME Status Awaiting Clarification Orders   Home Health Status Awaiting Orders for HH   Discharge Delays None known at this time       Patient discharging today needing home health and spouse requesting a wheelchair for discharge. SW awaiting orders for both to submit to [employer] for authorization.     Maryjo Ayala LMSW

## 2020-10-12 NOTE — DISCHARGE SUMMARY
[facility] Hospital Medicine  Discharge Summary      Patient Name: Bessy Nunez  MRN: [id number]  Admission Date: 10/11/2020  Hospital Length of Stay: 1 days  Discharge Date and Time:  10/12/2020 11:50 AM  Attending Physician: Ivett Orr MD   Discharging Provider: Geno Reyes NP  Primary Care Provider: Edgar Nova MD      HPI:   Patient presented to ER with generalized weakness and a fall at home. States he was sweeping cat food off the floor last night and went to push a chair out of the way, didn't realize it was on wheels, and feel onto his left side. In ER was found to have left rib fracture---CT report not yet completed and possible stranding around the kidneys. He denies dysuria, hematuria, frequency, flank pain or fevers. He has BPH sx of incomplete bladder emptying and straining for quite some time. He has h/o CVA and CNS vasculitis with chronic gait abnormalities. This morning he is alert, oriented and denies any memory changes, new onset weakness or gait changes. However, per ER, he was confused on arrival and wife reported new onset shuffling gait. telestroke initiated as wife was concerned for new CVA with these changes. Telestroke diagnosed as acute encephalopathy---no recs for MRI per notes or further TIA/CVA work up.     Labs show no leukocytosis. Cr 2.8 from 1.3-1.5 baseline. Lactic acid normal. Not hypotensive. Urine >100 WBC, cloudy and many bacteria; no casts. Admitted for UTI (possible pyleo pending final CT read), dehydration and metabolic encephalopathy to be treated with IVF, IV antibx. Mental status already seems to be improving.     * No surgery found *      Hospital Course:   Pt admitted yesterday on Cipro for UTI (urine culture in process; rocephin 1 dose in ER)) as well as NS at 125ml/hr IVF for dehydration (s/p 1L NS bolus in ER). VSS/afebrile. Creat has imprpved to baseline 2.8>1.6. No leukocytosis.     No pain meds for rib fracture. PT started yesterday  for mobility assessment   · :Sit to Stand:  stand by assistance with no AD  · Bed to Chair: stand by assistance with  no AD  using  Stand Pivot and Step Transfer  Gait: Standby Assistance x ~ 100 feet with no AD. Slight unsteady gait during changing directions  Goal is 250ft with supervision     Consults:   Consults (From admission, onward)        Status Ordering Provider     Inpatient consult to Telemedicine-Stroke  Once     Provider:  (Not yet assigned)    Acknowledged DALIA RADNHAWA     IP consult to case management  Once     Provider:  (Not yet assigned)    Completed SAEED BERRY          * Acute metabolic encephalopathy  telestroke initiated in ER--thought to be metabolic and not TIA/CVA  + UA and per ER report CT showing renal stranding conerning for pyelo--start cipro  MANISH with elevated Cr, likely mild dehydration--start IVF  He already seems to be improved this AM  Physical therapy started  Family not at bedside to confirm if he is back to baseline but he is alert and oriented this morning and able to provide history    Pt remains awake and alert. At baseline and stable for d/c home with po antibx for UTI    Acute renal insufficiency  Likely due to dehydartion, UTI  Start IVF  Daily labs  Avoid nephrotoxic agents  Renal function back to his baseline    Closed fracture of one rib of left side with routine healing  New problem  S/p fall at home  PRN [place], watch for confusion  No NSAIDs due to renal fxn  He has not required any prn meds and did well with pT    Home with home health and PT  Tramadol prn pain    Dementia with behavioral disturbance  Cont home aricept and zoloft  Seems to be at baseline      Osteoporosis with current pathological fracture with routine healing from steroids; compression fx 2017; 4/2019 bisphosphonate  On alendronate at home, weekly      History of stroke 9/2017 L thalamus; 7/2020 R thalamus; felt to be due to small vessel disease, not vasculitis  Cont asa, plavix and  statin  Good BP and gluose control  telestroke--no new events  CT head reviewed    Impaired functional mobility, balance, gait, and endurance  Chronic issues with gait and balance  Start PT here, may need home health with PT at discharge again  May also improve as we treat infection and dehydration  PT goal;s 250ft without assistance  10/11 Sit to Stand:  stand by assistance with no AD  · Bed to Chair: stand by assistance with  no AD  using  Stand Pivot and Step Transfer  Gait: Standby Assistance x ~ 100 feet with no AD. Slight unsteady gait during changing directions.    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  Noted  LFTs normal, follow up PCP      Essential hypertension 03/09/2020 TTE concentric LV remodeling.  Normal LV systolic function LVEF 55%.  Grade 2 diastolic dysfunction.  Normal CVP.  PA pressure 20.  Cont home meds of atenolol, diltiazem,--irbesartan held due to MANISH  -150s, will resume ARB upon d/c renal fucntion back to baseline  Want good BP control with h/o CVA and no evidence of new TIA/CVA so no need for permissive HTN at this point      Mixed hyperlipidemia  Cont atorvastatin      Final Active Diagnoses:    Diagnosis Date Noted POA    PRINCIPAL PROBLEM:  Acute metabolic encephalopathy [G93.41]  Yes    Acute renal insufficiency [N28.9] 10/11/2020 Yes    Closed fracture  "of one rib of left side with routine healing [S22.32XD] 01/03/2020 Not Applicable    Dementia with behavioral disturbance [F03.91] 04/30/2019 Yes     Chronic    Osteoporosis with current pathological fracture with routine healing from steroids; compression fx 2017; 4/2019 bisphosphonate [M80.00XD] 04/10/2019 Not Applicable    History of stroke 9/2017 L thalamus; 7/2020 R thalamus; felt to be due to small vessel disease, not vasculitis [Z86.73] 09/14/2017 Not Applicable    Impaired functional mobility, balance, gait, and endurance [Z74.09] 06/14/2017 Yes    CNS vasculitis failed imuran; failed cytoxan; 1/2020 rituxan [I77.6] 06/02/2017 Yes    Type 2 diabetes mellitus with diabetic polyneuropathy, with long-term current use of insulin [E11.42, Z79.4] 05/14/2017 Not Applicable     Chronic    NAFLD (nonalcoholic fatty liver disease) 6/12/2015 liver biopsy showing advanced stage fibrosis with bridging fibrosis and scattered nodules. [K76.0] 10/11/2013 Yes    Mixed hyperlipidemia [E78.2] 11/09/2012 Yes     Chronic    Essential hypertension 03/09/2020 TTE concentric LV remodeling.  Normal LV systolic function LVEF 55%.  Grade 2 diastolic dysfunction.  Normal CVP.  PA pressure 20. [I10] 11/09/2012 Yes     Chronic      Problems Resolved During this Admission:    Diagnosis Date Noted Date Resolved POA    Hypertensive encephalopathy [I67.4] 10/15/2018 10/11/2020 Unknown       Discharged Condition: good    Disposition: Home or Self Care with home health    Follow Up:  Follow-up Information     Edgar Nova MD In 1 week.    Specialty: Internal Medicine  Contact information:  [address] [place] [address]  [place] [address] [phone]                 Patient Instructions:      WHEELCHAIR FOR HOME USE     Order Specific Question Answer Comments   Hours in W/C per day: 8    Type of Wheelchair: Standard    Size(Width): 18"(STD adult)    Leg Support: STD footrests    Lap Belt: Velcro    Cushion: Basic    Height: 5' 10.5" (1.791 m)  "   Weight: 99.5 kg (219 lb 5.7 oz)    Does patient have medical equipment at home? rollator    Does patient have medical equipment at home? hospital bed    Does patient have medical equipment at home? bedside commode    Does patient have medical equipment at home? shower chair    Does patient have medical equipment at home? glucometer    Length of need (1-99 months): 99    Please check all that apply: Caregiver is capable and willing to operate wheelchair safely.    Please check all that apply: Patient's upper body strength is sufficient for propulsion.      Ambulatory referral/consult to Home Health   Referral Priority: Routine Referral Type: Home Health   Referral Reason: Specialty Services Required   Requested Specialty: Home Health Services   Number of Visits Requested: 1       Significant Diagnostic Studies:     Significant Labs:   CBC:        Recent Labs   Lab 10/11/20  0348 10/12/20  0523   WBC 10.16 6.29   HGB 12.0* 10.4*   HCT 34.8* 30.1*    126*      CMP:        Recent Labs   Lab 10/11/20  0348 10/12/20  0523    142   K 4.2 4.1    110   CO2 22* 22*   * 182*   BUN 31* 22   CREATININE 2.8* 1.6*   CALCIUM 9.8 8.8   PROT 7.3 6.3   ALBUMIN 4.1 3.3*   BILITOT 0.9 0.7   ALKPHOS 89 85   AST 15 15   ALT 16 14   ANIONGAP 15 10   EGFRNONAA 23* 45*      Troponin:         Recent Labs   Lab 10/11/20  1354 10/11/20  1955 10/12/20  0207   TROPONINI 0.022 0.026 0.025      10/11/2020  Lactic acid 1.6  INR 1.0  Covid screen negative      Urine Studies:       Recent Labs   Lab 10/11/20  0456   COLORU Yellow   APPEARANCEUA Cloudy*   PHUR >8.0   SPECGRAV 1.025   PROTEINUA 3+*   GLUCUA Negative   KETONESU Negative   BILIRUBINUA Negative   OCCULTUA 1+*   NITRITE Negative   UROBILINOGEN Negative   LEUKOCYTESUR 3+*   RBCUA 4   WBCUA >100*   BACTERIA Many*   HYALINECASTS 0   urine culture in process  Blood cultures x 2 NGTD     Significant Imaging:      CT head   1. No evidence of an acute intracranial  abnormality or significant interval detrimental change as compared to the prior study.  Clinical correlation further evaluation with MRI, as warranted.  2. Generalized cerebral volume loss with confluent and patchy regions of supratentorial white matter hypoattenuation most compatible with a significant degree of chronic microvascular change, with chronic bilateral thalami, basal ganglia, and cerebellar infarcts again noted.  The preliminary and final reports are concordant     Ct chest abd and pelvis    1. Acute, mildly displaced lateral left 9th rib fracture.  No additional acute fracture or evidence of acute traumatic injury in the chest, abdomen or pelvis.  2. Cholelithiasis without CT evidence of acute cholecystitis.  3. Nonobstructive right nephrolithiasis.  Compared to urinary bladder wall thickening, which may be secondary to incomplete distension, chronic outflow obstruction, cystitis, or neurogenic bladder.  4. Diverticulosis coli without evidence of diverticulitis.     EKG   Normal sinus rhythm   Nonspecific T wave abnormality   Abnormal ECG   When compared with ECG of 30-JUL-2020 12:48,   NH interval has decreased   Confirmed by Adis MAYS MD ([id number]) on 10/11/2020 10:09:      Pending Diagnostic Studies:     None         Medications:  Reconciled Home Medications:      Medication List      START taking these medications    ciprofloxacin HCl 500 MG tablet  Commonly known as: CIPRO  Take 1 tablet (500 mg total) by mouth every 12 (twelve) hours.     traMADoL 50 mg tablet  Commonly known as: ULTRAM  Take 1 tablet (50 mg total) by mouth every 6 (six) hours.        CONTINUE taking these medications    alendronate 70 MG tablet  Commonly known as: FOSAMAX  Take 1 tablet (70 mg total) by mouth every 7 days.     aspirin 81 MG EC tablet  Commonly known as: ECOTRIN  Take 81 mg by mouth once daily.     atenoloL 50 MG tablet  Commonly known as: TENORMIN  Take 1 tablet (50 mg total) by mouth once daily.     atorvastatin  "40 MG tablet  Commonly known as: LIPITOR  Take 1 tablet (40 mg total) by mouth once daily.     blood sugar diagnostic Strp  Commonly known as: TRUE METRIX GLUCOSE TEST STRIP  Test blood sugar four times a day     clopidogreL 75 mg tablet  Commonly known as: PLAVIX  Take 1 tablet (75 mg total) by mouth once daily.     diltiaZEM 240 MG Cdcr  Commonly known as: DILT-XR  Take 1 capsule (240 mg total) by mouth once daily.     donepeziL 5 MG tablet  Commonly known as: ARICEPT  Take 1 tablet (5 mg total) by mouth every evening.     FREESTYLE ARELY 14 DAY READER Misc  Generic drug: flash glucose scanning reader  GLUCOSE TESTING : FASTING AND PRIOR TO MEALS     FREESTYLE ARELY 14 DAY SENSOR Kit  Generic drug: flash glucose sensor  GLUCOSE TESTING 4 TIMES A DAY     insulin aspart U-100 100 unit/mL (3 mL) Inpn pen  Commonly known as: NovoLOG  Inject 10 Units into the skin before meals and at bedtime as needed (Hyperglycemia). Up to 20 BID with chemo     irbesartan 300 MG tablet  Commonly known as: AVAPRO  Take 1 tablet (300 mg total) by mouth every evening.     LANTUS SOLOSTAR U-100 INSULIN glargine 100 units/mL (3mL) SubQ pen  Generic drug: insulin  Inject 25 Units into the skin 2 (two) times a day. Up to 40 bid with chemotherapy     methylphenidate HCl 10 MG tablet  Commonly known as: RITALIN  Take 1 tablet (10 mg total) by mouth 2 (two) times daily with meals.     omega-3 fatty acids-vitamin E 1,000 mg Cap  Commonly known as: FISH OIL  Take 1 capsule by mouth 2 (two) times daily.     oxybutynin 5 MG Tr24  Commonly known as: DITROPAN-XL  Take 1 tablet (5 mg total) by mouth once daily.     pen needle, diabetic 32 gauge x 5/32" Ndle  USE 1 PEN NEEDLE 4 TIMES DAILY (  SINGLE  USE)     QUEtiapine 25 MG Tab  Commonly known as: SEROQUEL  Take 1 tablet (25 mg total) by mouth every evening.     sertraline 100 MG tablet  Commonly known as: ZOLOFT  1.5 tablet daily     SUPREP BOWEL PREP KIT 17.5-3.13-1.6 gram Solr  Generic drug: " sodium,potassium,mag sulfates  USE AS DIRECTED     TRUEPLUS LANCETS 30 gauge Misc  Generic drug: lancets  Test blood sugar four times a day     VICTOZA 3-TOMASZ 0.6 mg/0.1 mL (18 mg/3 mL) Pnij pen  Generic drug: liraglutide 0.6 mg/0.1 mL (18 mg/3 mL) subq PNIJ  Inject 1.8 mg into the skin once daily.     vitamin D 1000 units Tab  Commonly known as: VITAMIN D3  Take 5 tablets (5,000 Units total) by mouth once daily.            Indwelling Lines/Drains at time of discharge:   Lines/Drains/Airways     Central Venous Catheter Line                 Port A Cath Single Lumen 12/07/18 1219 right internal jugular 674 days          Drain            Male External Urinary Catheter 08/01/20 1940 Small 71 days                Time spent on the discharge of patient: 20 minutes  Patient was seen and examined on the date of discharge and determined to be suitable for discharge.         Geno Reyes NP  Department of Hospital Medicine  [facility]

## 2020-10-12 NOTE — PLAN OF CARE
10/12/20 0722   GONZALEZ Message   Medicare Outpatient and Observation Notification regarding financial responsibility Given to patient/caregiver;Explained to patient/caregiver;Signed/date by patient/caregiver   Date GONZALEZ was signed 10/11/20   Time GONZALEZ was signed [id number]

## 2020-10-13 LAB — BACTERIA UR CULT: ABNORMAL

## 2020-10-13 NOTE — PLAN OF CARE
10/13/20 0821   Final Note   Assessment Type Final Discharge Note   Anticipated Discharge Disposition Home-Health   What phone number can be called within the next 1-3 days to see how you are doing after discharge? [phone]   Hospital Follow Up  Appt(s) scheduled? Yes   Discharge plans and expectations educations in teach back method with documentation complete? Yes   Post-Acute Status   Post-Acute Authorization HME;Home Health   HME Status Set-up Complete   Home Health Status Set-up Complete   Discharge Delays None known at this time       Patient approved by [employer] for a wheelchair and home health. Patient will obtain the wheelchair through [facility] and home health through [facility].     Maryjo Ayala LMSW

## 2020-10-14 PROCEDURE — G0180 PR HOME HEALTH MD CERTIFICATION: ICD-10-PCS | Mod: ,,, | Performed by: INTERNAL MEDICINE

## 2020-10-14 PROCEDURE — G0180 MD CERTIFICATION HHA PATIENT: HCPCS | Mod: ,,, | Performed by: INTERNAL MEDICINE

## 2020-10-15 ENCOUNTER — TELEPHONE (OUTPATIENT)
Dept: FAMILY MEDICINE | Facility: CLINIC | Age: 60
End: 2020-10-15

## 2020-10-15 NOTE — TELEPHONE ENCOUNTER
----- Message from Edgar Nova MD sent at 10/15/2020  4:24 PM CDT -----  Regarding: FW: Jennifer from [facility]    ----- Message -----  From: Amirah Shell MA  Sent: 10/15/2020  10:18 AM CDT  To: Edgar Nova MD  Subject: FW: Jennifer from [facility]               ----- Message -----  From: Yared Garvin  Sent: 10/15/2020   9:49 AM CDT  To: Rohini Hernández Staff  Subject: Jennifer from [facility]                 Type: Patient Call Back    Who called:Jennifer     What is the request in detail: the patient was discharged from [facility] on 10/12. A referral was put in to [facility] for  home health to admit . She wants to know if Dr. Nova would take over signing his orders. She also stated that the patient had a fall , but no injury was reported. The ambulance was called for him, but the patient declined. Patient does have a hospital follow up with Dr. Stafford today at 2pm at the clinic    Can the clinic reply by [facility]?no    Would the patient rather a call back or a response via [facility]? Call back     Best call back number:[phone]

## 2020-10-15 NOTE — TELEPHONE ENCOUNTER
Son, Marky, calling to notify this office that the patient will be 28 minutes late for his 2 pm appointment today.  Informed that his appointment will need to be rescheduled as it requires 40 minutes.  Verbalized understanding and rescheduled appointment for 10/19/2020 @ 4pm.

## 2020-10-16 ENCOUNTER — PATIENT MESSAGE (OUTPATIENT)
Dept: NEUROLOGY | Facility: CLINIC | Age: 60
End: 2020-10-16

## 2020-10-16 PROBLEM — K80.20 CALCULUS OF GALLBLADDER WITHOUT CHOLECYSTITIS WITHOUT OBSTRUCTION: Status: ACTIVE | Noted: 2020-10-16

## 2020-10-16 PROBLEM — N18.30 CHRONIC KIDNEY DISEASE, STAGE 3: Status: ACTIVE | Noted: 2020-10-16

## 2020-10-16 PROBLEM — N28.9 ACUTE RENAL INSUFFICIENCY: Status: RESOLVED | Noted: 2020-10-11 | Resolved: 2020-10-16

## 2020-10-16 PROBLEM — N20.0 NEPHROLITHIASIS: Status: ACTIVE | Noted: 2020-10-16

## 2020-10-17 NOTE — PROGRESS NOTES
This note was created by combination of typed  and M-Modal dictation.  Transcription errors may be present.  If there are any questions, please contact me.    Assessment and Plan:   Acute metabolic encephalopathy  Acute cystitis without hematuria  MANISH (acute kidney injury) hospitalization 03/2020 responded to IV fluids  Stage 3 chronic kidney disease, unspecified whether stage 3a or 3b CKD  Closed fracture of one rib of left side with routine healing  -s/p hospitalization for encephalopathy, MANISH, and incidental UTI. On cipro to finish out next week.  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  SE profile discussed  Stop the tramadol  Avoid nsaids  Repeat UA on cipro  Check BMP to look for trending down  Wife notes it's difficult to get him to drink fluids - hard to monitor the patient 24/7  -     Urinalysis, Reflex to Urine Culture Urine, Clean Catch; Future; Expected date: 10/19/2020  -     HYDROcodone-acetaminophen (NORCO) 5-325 mg per tablet; Take 1 tablet by mouth every 8 (eight) hours as needed for Pain.  Dispense: 42 tablet; Refill: 0  -     Basic Metabolic Panel; Future; Expected date: 10/19/2020    Abrasion of right foot, initial encounter  -rx for mupirocin.  On cipro. I don't think he needs oral antibiotic at this time.    History of stroke 9/2017 L thalamus; 7/2020 R thalamus; felt to be due to small vessel disease, not vasculitis  -on ASA monotherapy, off of plavix, on statin, BP good, no changes.    Major depressive disorder with single episode, in full remission  -stable on zoloft, seroquel, aricept  Inattentiveness, finds the ritalin helpful, has been filled by neurology, I will fill today, future refills through neuro    Controlled type 2 diabetes mellitus with diabetic nephropathy, with long-term current use of insulin  Type 2 diabetes mellitus with diabetic polyneuropathy, with long-term  current use of insulin  -check a1c. On trulicity, on lantus, PRN use of novolog for glc > 180. No corrective hypoglycemia per wife.  -     Hemoglobin A1C; Future; Expected date: 10/19/2020    Calculus of gallbladder without cholecystitis without obstruction  Nephrolithiasis incidental on CT 10/2020  -incidental.    Medications Discontinued During This Encounter   Medication Reason    traMADoL (ULTRAM) 50 mg tablet Therapy completed    clopidogreL (PLAVIX) 75 mg tablet Therapy completed       meds sent this encounter:  Medications Ordered This Encounter   Medications    HYDROcodone-acetaminophen (NORCO) 5-325 mg per tablet     Sig: Take 1 tablet by mouth every 8 (eight) hours as needed for Pain.     Dispense:  42 tablet     Refill:  0     Quantity prescribed more than 7 day supply? Yes, quantity medically necessary    mupirocin (BACTROBAN) 2 % ointment     Sig: Apply topically 3 (three) times daily.     Dispense:  30 g     Refill:  0       Follow Up: No follow-ups on file.    Subjective:     Chief Complaint   Patient presents with    Hospital Follow Up       HPI  Bessy is a 60 y.o. male, last appointment with this clinic was 8/5/2020.    Last visit with me early August.  Cerebral vasculitis.  Stroke in July due to small-vessel disease and not due to vasculitis.  Was discharged on aspirin and Plavix.  After 3 weeks aspirin monotherapy.  On statin.  BP was controlled.  Cerebral vasculitis.  Managed by Rheumatology and Neurology.  On Rituxan.  Cytoxan work better but was too toxic.  Cognitive impairment.  Dementia.  Donepezil.  Depression, Zoloft  Diabetes.  A1c was good.  Metformin side effect of diarrhea.  On Basaglar and NovoLog and Victoza.  When he gets chemotherapy with steroids they increase his Basaglar and NovoLog.  Hyperlipidemia on statin.  Hypertension at that visit was good.  Osteoporosis with compression fracture on Fosamax  Sleep apnea ordered autoPAP.    Saw vascular Neurology in follow-up.  No  change in plan.  Consider neuropsych testing.    Follow-up for overnight hospitalization for fall.  Lost his balance while cleaning the house and fell.  He exhibited some change in neurologic exam with change in his gait.  Concerns about CVA.  Tele stroke diagnosed him with acute encephalopathy.  Labs showed urine with white blood cells and bacteria.  CT questionable for pyelonephritis.  X-ray showed left rib fracture.  Labs showed acute kidney injury.  Responded to IV fluids.    ucx proteus pan sensitive    Discharged on cipro    12/2019 creatinine 1.0, 1.1 but ever since has been CKD.  Suspect that his kidneys are very slow to recover.  1/2020 renal cysts    Still with pain. Tramadol without significant relief.  Takes 2 at a time TID. Incomplete control.  Will escalate pain medication.    Wears adult diapers.  Also goes to the bathroom.  But forgets to go to the bathroom. Wife checks him often.  It's a struggle to get him to change the underwear out.   Will also have a bowel movement in his underwear as well.     Wife noted foul smell to the urine.  And an almost milky appearance to the urine at the time of the fall. Pt was asymptomatic.     His glucose throughout all this has been holding steady. 127 this AM by recall.     Wife has been giving him advil alternating with the tramadol. Instructed to avoid nsaids.    Somnolent during exam. Baseline. Wife gives him seroquel for agitation.  Ritalin helps with alertness.   zoloft is helpful. Without it - aggressive.    lantus 25 bid still  novolog prn use.  Rare use. When it is around 180 or more. She'll give him 10 units at that time. And no hypoglycemia.     Wife noticed a skin lesion on the right foot with associated swelling, unsure how long it's been there, using neosporin, does see some drainage from it.  Does not think it was from the fall.     Patient Care Team:  Edgar Nova MD as PCP - General (Internal Medicine)  Promise Steele NP as Nurse Practitioner  (Endocrinology)  Shana Love MD as Consulting Physician (Neurology)  Mima Pina MA as Care Coordinator  Mario Aceves MD as Consulting Physician (Gastroenterology)    Patient Active Problem List    Diagnosis Date Noted    Calculus of gallbladder without cholecystitis without obstruction incidental on CT 10/2020 10/16/2020    Nephrolithiasis incidental on CT 10/2020 10/16/2020    Chronic kidney disease, stage 3 10/16/2020    Thrombocytopenia, unspecified 08/05/2020    Urinary incontinence due to cognitive impairment 08/05/2020    Major depressive disorder with single episode, in full remission 08/05/2020    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    Closed fracture of one rib of left side with routine healing 01/03/2020    Abrasion 01/03/2020    Stress 11/04/2019    Diarrhea 07/18/2019    Anemia associated with chemotherapy 07/18/2019    Chemotherapy-induced thrombocytopenia 07/18/2019    MANISH (acute kidney injury) hospitalization 03/2020 responded to IV fluids 06/21/2019    Dementia with behavioral disturbance 04/30/2019    Osteoporosis with current pathological fracture with routine healing from steroids; compression fx 2017; 4/2019 bisphosphonate 04/10/2019    Duodenal ulcer 6/2017 EGD - duodenum and gastric ulcer s/p cautery 03/28/2019 6/2017 EGD showing duodenal and gastric ulcer which were treated with cautery.      Renal cyst bilateral simple and minimally, complicated renal cysts. 03/28/2019 1/7/2020 renal US: Two stable simple to minimally complex right renal cysts.     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    Central scotoma, right eye 02/06/2019    Encounter for chemotherapy management 12/07/2018    Need for Tdap vaccination 08/29/2018    Need for hepatitis A and B vaccination 08/29/2018    Need for pneumococcal vaccination 08/29/2018    Acquired immunocompromised state 08/29/2018     cyclophosphamide      Controlled  type 2 diabetes mellitus with diabetic nephropathy, with long-term current use of insulin 08/01/2018    Chemotherapy induced nausea and vomiting 06/12/2018    Dysphasia     Cognitive impairment 12/04/2017    Closed compression fracture of L4 lumbar vertebra on MRI 11/2017 11/15/2017     11/2017 MRI L spine: Acute burst type fracture of the L4 vertebra with fracture line extending to the posterior cortex of the L4 vertebra. There is loss of central vertebral body height of the superior end plate of approximately 40%.  IMO Regulatory Load October 2019      Adverse effect of glucocorticoids and synthetic analogues, initial encounter 09/15/2017    History of stroke 9/2017 L thalamus; 7/2020 R thalamus; felt to be due to small vessel disease, not vasculitis 09/14/2017 9/14/2017 MRI brain: 1. Findings compatible with a small acute lacunar infarct adjacent to the left frontal horn.  Nonspecific enhancement just inferior to this region and extending into the left basal ganglia, as well as within the inferior aspect of the left cerebellum.  No evidence of a focal mass.  2.  Extensive increased signal intensity involving the periventricular white matter compatible with demyelinating disease versus vasculitis.  3.  Clinical correlation and followup recommended.  4.  This reportedly flattened in the EPIC and the corpus callosum medical record system.  12/4/19 MRI brain with/without: Chronic ischemic change as further detailed above, similar to MRI of 04/15/2019.  Multiple small foci of prior hemorrhage in the cerebellum and jose, possible sequela from hypertension.  No evidence of recent infarction or other acute intracranial pathology.    7/30/2020 MRI brain: Subcentimeter focus of diffusion restriction right parietal periventricular white matter concerning for acute/recent infarction.  In light of history is may be sequela of underlying vasculitis with superimposed severe patchy and confluent T2 FLAIR signal  abnormality supratentorial white matter and jose.  Small remote left basal ganglia and bilateral thalamic remote lacunar-type infarct with additional remote inferior cerebellar infarcts with encephalomalacia.  Punctate remote microhemorrhages cerebellar hemispheres and brainstem.    Etiology for neurologic findings believed to be more likely secondary to small vessel disease causing CVA rather than CNS vasculitis however if the patient were to experience similar events in the future it could still be considered. Therapy recommended HH for ongoing therapy. Patient without any further new or worsening symptoms. He is going home with home health on 8/3. Restarted home diltiazem on discharge. Discharging with walker and HH. Discharging with another 21 days of joint DAPT with plavix and ASA followed by ASA monotherapy. Follow up in stroke clinic in 4-6 weeks. All information relayed to patient and family prior to discharge.       Episodic confusion      Improved now that back on cyclophosphamide      Cytopenia 08/30/2017    Impaired functional mobility, balance, gait, and endurance 06/14/2017    Urinary incontinence 06/04/2017    CNS vasculitis failed imuran; failed cytoxan; 1/2020 rituxan 06/02/2017     Failed Cytoxan hiatus and transition to Imuran Sept/Oct 2018; restarted on cytoxan  12/4/19 MRI brain with/without: Chronic ischemic change as further detailed above, similar to MRI of 04/15/2019.  Multiple small foci of prior hemorrhage in the cerebellum and jose, possible sequela from hypertension.  No evidence of recent infarction or other acute intracranial pathology.      Acute metabolic encephalopathy     Type 2 diabetes mellitus with diabetic polyneuropathy, with long-term current use of insulin 05/14/2017    Amblyopia 05/13/2017    Tubular adenoma of colon 2014, 5/2019; 7/2020 C scope mult polyps, path not included; repeat 3 years; Ketan 01/30/2015 2/21/2014 colonoscopy showing mild nonspecific  acute and chronic inflammation of the ascending colon.  Hyperplastic polyp. Repeat 3 years.  5/21/2019 colonscopy 8 ascending 4 descending polyps path 2 tubular adenomas, rest are inflammatory polyps; repeat 1 year  7/20/20 C scope 3 polyps sigmoid 4 polyps transverse, several > 10 mm; path pending; repeat 3 years      Lipodystrophy 10/11/2013    NAFLD (nonalcoholic fatty liver disease) 6/12/2015 liver biopsy showing advanced stage fibrosis with bridging fibrosis and scattered nodules. 10/11/2013     6/12/2015 liver biopsy showing advanced stage fibrosis with bridging fibrosis and scattered nodules.      Obesity (BMI 30-39.9) 10/11/2013    ED (erectile dysfunction) 10/11/2013     JOHN with CPAP at night 10/11/2013     07/30/2020 2 night at home sleep study very severe sleep apnea AHI 54      Mixed hyperlipidemia 11/09/2012    Essential hypertension 03/09/2020 TTE concentric LV remodeling.  Normal LV systolic function LVEF 55%.  Grade 2 diastolic dysfunction.  Normal CVP.  PA pressure 20. 11/09/2012 5/11/2016 TTE:   1 - Mildly depressed left ventricular systolic function (EF 45-50%).  Mild global hypokinesis at rest. 2 - Eccentric hypertrophy. 3 - Left ventricular diastolic dysfunction.  3/2020 hospitalization for dehydration with mild elevated troponin.  Echo normal.  03/09/2020 TTE concentric LV remodeling.  Normal LV systolic function LVEF 55%.  Grade 2 diastolic dysfunction.  Normal CVP.  PA pressure 20.         PAST MEDICAL HISTORY:  Past Medical History:   Diagnosis Date    Amblyopia     Cataract     CNS vasculitis 6/2/2017    Follows with Dr. Love By mri.  Several active lesions, many old. 5/13: MRA brain/neck, MRI brain w/wo contrast show no vascular occlusion, multiple foci of hyperintensity in deep cerebral periventricular white matter in pattern of demyelinating process.  5/17: MRI spine performed, no spinal cord lesions. Hospitalization 6/2017. EEG was performed negative for seizures.   Repeat MRI showing new lesion, question whether this was demyelination versus CVA. 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 Findings compatible with a small acute lacunar infarct adjacent to the left frontal horn.  Nonspecific enhancement just inferior to this region and extending into the left basal ganglia, as well as within the inferior aspect of the left cerebellum.  No evidence of a focal mass. 2.  Extensive increased signal intensity involving the periventricular white matter compatible with demyelinating disease versus vasculitis. 3.  Clinical correlation and followup recommended. 4.  This reportedly flattened in the EPIC and the corpus callosum medical record system.    Mixed hyperlipidemia 11/9/2012    NAFLD (nonalcoholic fatty liver disease) 10/11/2013    CHASE (nonalcoholic steatohepatitis)     Obesity     Stroke     Type 2 diabetes mellitus with diabetic polyneuropathy, with long-term current use of insulin 5/14/2017    Type 2 diabetes mellitus with hyperglycemia, with long-term current use of insulin 10/11/2013       PAST SURGICAL HISTORY:  Past Surgical History:   Procedure Laterality Date    COLONOSCOPY N/A 5/21/2019    Procedure: COLONOSCOPY;  Surgeon: Alex Ascencio MD;  Location: [facility];  Service: Endoscopy;  Laterality: N/A;  confirmed appt-sp    INSERTION OF TUNNELED CENTRAL VENOUS CATHETER (CVC) WITH SUBCUTANEOUS PORT N/A 12/7/2018    Procedure: [id number];  Surgeon: Luan Diagnostic Provider;  Location: [facility] OR [address] [place] [address];  Service: Radiology;  Laterality: N/A;    SKIN CANCER EXCISION         SOCIAL HISTORY:  Social History     Socioeconomic History    Marital status:      Spouse name: Not on file    Number of children: Not on file    Years of  education: Not on file    Highest education level: Not on file   Occupational History    Occupation: unemployed   Social Needs    Financial resource strain: Very hard    Food insecurity     Worry: Never true     Inability: Never true    Transportation needs     Medical: No     Non-medical: No   Tobacco Use    Smoking status: Never Smoker    Smokeless tobacco: Never Used   Substance and Sexual Activity    Alcohol use: Yes     Alcohol/week: 0.0 standard drinks     Frequency: Never     Drinks per session: 1 or 2     Binge frequency: Never     Comment: only on occasion    Drug use: No    Sexual activity: Yes     Partners: Female   Lifestyle    Physical activity     Days per week: 0 days     Minutes per session: 0 min    Stress: Only a little   Relationships    Social connections     Talks on phone: More than three times a week     Gets together: Once a week     Attends Religion service: Not on file     Active member of club or organization: No     Attends meetings of clubs or organizations: Never     Relationship status:    Other Topics Concern    Not on file   Social History Narrative    Not on file       ALLERGIES AND MEDICATIONS: updated and reviewed.  Review of patient's allergies indicates:  No Known Allergies    Medication List with Changes/Refills   Current Medications    ALENDRONATE (FOSAMAX) 70 MG TABLET    Take 1 tablet (70 mg total) by mouth every 7 days.    ASPIRIN (ECOTRIN) 81 MG EC TABLET    Take 81 mg by mouth once daily.    ATENOLOL (TENORMIN) 50 MG TABLET    Take 1 tablet (50 mg total) by mouth once daily.    ATORVASTATIN (LIPITOR) 40 MG TABLET    Take 1 tablet (40 mg total) by mouth once daily.    BLOOD SUGAR DIAGNOSTIC (TRUE METRIX GLUCOSE TEST STRIP) STRP    Test blood sugar four times a day    CIPROFLOXACIN HCL (CIPRO) 500 MG TABLET    Take 1 tablet (500 mg total) by mouth every 12 (twelve) hours.    CLOPIDOGREL (PLAVIX) 75 MG TABLET    Take 1 tablet (75 mg total) by mouth  "once daily.    DILTIAZEM (DILT-XR) 240 MG CDCR    Take 1 capsule (240 mg total) by mouth once daily.    DONEPEZIL (ARICEPT) 5 MG TABLET    Take 1 tablet (5 mg total) by mouth every evening.    FREESTYLE ARELY 14 DAY READER MISC    GLUCOSE TESTING : FASTING AND PRIOR TO MEALS    FREESTYLE ARELY 14 DAY SENSOR KIT    GLUCOSE TESTING 4 TIMES A DAY    INSULIN (LANTUS SOLOSTAR U-100 INSULIN) GLARGINE 100 UNITS/ML (3ML) SUBQ PEN    Inject 25 Units into the skin 2 (two) times a day. Up to 40 bid with chemotherapy    INSULIN ASPART U-100 (NOVOLOG) 100 UNIT/ML (3 ML) INPN PEN    Inject 10 Units into the skin before meals and at bedtime as needed (Hyperglycemia). Up to 20 BID with chemo    IRBESARTAN (AVAPRO) 300 MG TABLET    Take 1 tablet (300 mg total) by mouth every evening.    LANCETS 30 GAUGE MISC    Test blood sugar four times a day    LIRAGLUTIDE 0.6 MG/0.1 ML, 18 MG/3 ML, SUBQ PNIJ (VICTOZA 3-TOMASZ) 0.6 MG/0.1 ML (18 MG/3 ML) PNIJ PEN    Inject 1.8 mg into the skin once daily.    METHYLPHENIDATE HCL (RITALIN) 10 MG TABLET    Take 1 tablet (10 mg total) by mouth 2 (two) times daily with meals.    OMEGA-3 FATTY ACIDS-VITAMIN E (FISH OIL) 1,000 MG CAP    Take 1 capsule by mouth 2 (two) times daily.    OXYBUTYNIN (DITROPAN-XL) 5 MG TR24    Take 1 tablet (5 mg total) by mouth once daily.    PEN NEEDLE, DIABETIC 32 GAUGE X 5/32" NDLE    USE 1 PEN NEEDLE 4 TIMES DAILY (  SINGLE  USE)    QUETIAPINE (SEROQUEL) 25 MG TAB    Take 1 tablet (25 mg total) by mouth every evening.    SERTRALINE (ZOLOFT) 100 MG TABLET    1.5 tablet daily    SUPREP BOWEL PREP KIT 17.5-3.13-1.6 GRAM SOLR    USE AS DIRECTED    TRAMADOL (ULTRAM) 50 MG TABLET    Take 1 tablet (50 mg total) by mouth every 6 (six) hours.    VITAMIN D 1000 UNITS TAB    Take 5 tablets (5,000 Units total) by mouth once daily.        Review of Systems   All other systems reviewed and are negative.      Objective:   Physical Exam   Vitals:    10/19/20 0943   BP: 134/86   BP " "Location: Left arm   Patient Position: Sitting   BP Method: Medium (Manual)   Pulse: 64   Temp: 97.7 °F (36.5 °C)   TempSrc: Temporal   SpO2: 97%   Weight: 99.8 kg (220 lb)   Height: 5' 10" (1.778 m)    Body mass index is 31.57 kg/m².  Weight: 99.8 kg (220 lb)   Height: 5' 10" (177.8 cm)     Physical Exam  Constitutional:       General: He is not in acute distress.     Appearance: He is well-developed.   Cardiovascular:      Rate and Rhythm: Normal rate and regular rhythm.      Heart sounds: Normal heart sounds. No murmur.   Pulmonary:      Effort: Pulmonary effort is normal.      Breath sounds: Normal breath sounds.   Musculoskeletal: Normal range of motion.   Skin:     General: Skin is warm and dry.      Comments: See right foot photo - on the lateral distal foot with about 1 cm superficial abrasion with underlying skin visible, no connective tissue, minimal surrounding erythema.   Neurological:      Mental Status: He is alert. Mental status is at baseline.      Coordination: Coordination normal.   Psychiatric:         Behavior: Behavior normal.         Thought Content: Thought content normal.             "

## 2020-10-19 ENCOUNTER — TELEPHONE (OUTPATIENT)
Dept: FAMILY MEDICINE | Facility: CLINIC | Age: 60
End: 2020-10-19

## 2020-10-19 ENCOUNTER — LAB VISIT (OUTPATIENT)
Dept: LAB | Facility: HOSPITAL | Age: 60
End: 2020-10-19
Attending: INTERNAL MEDICINE
Payer: MEDICARE

## 2020-10-19 ENCOUNTER — OFFICE VISIT (OUTPATIENT)
Dept: FAMILY MEDICINE | Facility: CLINIC | Age: 60
End: 2020-10-19
Payer: MEDICARE

## 2020-10-19 VITALS
TEMPERATURE: 98 F | HEIGHT: 70 IN | SYSTOLIC BLOOD PRESSURE: 134 MMHG | OXYGEN SATURATION: 97 % | WEIGHT: 220 LBS | BODY MASS INDEX: 31.5 KG/M2 | HEART RATE: 64 BPM | DIASTOLIC BLOOD PRESSURE: 86 MMHG

## 2020-10-19 DIAGNOSIS — R53.83 FATIGUE, UNSPECIFIED TYPE: ICD-10-CM

## 2020-10-19 DIAGNOSIS — N17.9 AKI (ACUTE KIDNEY INJURY): ICD-10-CM

## 2020-10-19 DIAGNOSIS — Z79.4 CONTROLLED TYPE 2 DIABETES MELLITUS WITH DIABETIC NEPHROPATHY, WITH LONG-TERM CURRENT USE OF INSULIN: ICD-10-CM

## 2020-10-19 DIAGNOSIS — S22.32XD CLOSED FRACTURE OF ONE RIB OF LEFT SIDE WITH ROUTINE HEALING: ICD-10-CM

## 2020-10-19 DIAGNOSIS — I77.6 CNS VASCULITIS: ICD-10-CM

## 2020-10-19 DIAGNOSIS — N20.0 NEPHROLITHIASIS: ICD-10-CM

## 2020-10-19 DIAGNOSIS — Z79.4 TYPE 2 DIABETES MELLITUS WITH DIABETIC POLYNEUROPATHY, WITH LONG-TERM CURRENT USE OF INSULIN: Chronic | ICD-10-CM

## 2020-10-19 DIAGNOSIS — G93.41 ACUTE METABOLIC ENCEPHALOPATHY: Primary | ICD-10-CM

## 2020-10-19 DIAGNOSIS — K80.20 CALCULUS OF GALLBLADDER WITHOUT CHOLECYSTITIS WITHOUT OBSTRUCTION: ICD-10-CM

## 2020-10-19 DIAGNOSIS — Z86.73 HISTORY OF STROKE: ICD-10-CM

## 2020-10-19 DIAGNOSIS — D84.9 IMMUNOSUPPRESSION: ICD-10-CM

## 2020-10-19 DIAGNOSIS — N30.00 ACUTE CYSTITIS WITHOUT HEMATURIA: ICD-10-CM

## 2020-10-19 DIAGNOSIS — E11.42 TYPE 2 DIABETES MELLITUS WITH DIABETIC POLYNEUROPATHY, WITH LONG-TERM CURRENT USE OF INSULIN: Chronic | ICD-10-CM

## 2020-10-19 DIAGNOSIS — F32.5 MAJOR DEPRESSIVE DISORDER WITH SINGLE EPISODE, IN FULL REMISSION: Chronic | ICD-10-CM

## 2020-10-19 DIAGNOSIS — N18.30 STAGE 3 CHRONIC KIDNEY DISEASE, UNSPECIFIED WHETHER STAGE 3A OR 3B CKD: ICD-10-CM

## 2020-10-19 DIAGNOSIS — E11.21 CONTROLLED TYPE 2 DIABETES MELLITUS WITH DIABETIC NEPHROPATHY, WITH LONG-TERM CURRENT USE OF INSULIN: ICD-10-CM

## 2020-10-19 DIAGNOSIS — S90.811A ABRASION OF RIGHT FOOT, INITIAL ENCOUNTER: ICD-10-CM

## 2020-10-19 LAB
ANION GAP SERPL CALC-SCNC: 9 MMOL/L (ref 8–16)
BUN SERPL-MCNC: 20 MG/DL (ref 8–23)
CALCIUM SERPL-MCNC: 8.9 MG/DL (ref 8.7–10.5)
CHLORIDE SERPL-SCNC: 106 MMOL/L (ref 95–110)
CO2 SERPL-SCNC: 26 MMOL/L (ref 23–29)
CREAT SERPL-MCNC: 1.3 MG/DL (ref 0.5–1.4)
EST. GFR  (AFRICAN AMERICAN): >60 ML/MIN/1.73 M^2
EST. GFR  (NON AFRICAN AMERICAN): 58.5 ML/MIN/1.73 M^2
ESTIMATED AVG GLUCOSE: 174 MG/DL (ref 68–131)
GLUCOSE SERPL-MCNC: 270 MG/DL (ref 70–110)
HBA1C MFR BLD HPLC: 7.7 % (ref 4–5.6)
IGA SERPL-MCNC: 217 MG/DL (ref 40–350)
IGG SERPL-MCNC: 715 MG/DL (ref 650–1600)
IGM SERPL-MCNC: 28 MG/DL (ref 50–300)
POTASSIUM SERPL-SCNC: 5 MMOL/L (ref 3.5–5.1)
SODIUM SERPL-SCNC: 141 MMOL/L (ref 136–145)

## 2020-10-19 PROCEDURE — 99499 UNLISTED E&M SERVICE: CPT | Mod: S$GLB,,, | Performed by: INTERNAL MEDICINE

## 2020-10-19 PROCEDURE — 83036 HEMOGLOBIN GLYCOSYLATED A1C: CPT

## 2020-10-19 PROCEDURE — 86706 HEP B SURFACE ANTIBODY: CPT

## 2020-10-19 PROCEDURE — 3051F HG A1C>EQUAL 7.0%<8.0%: CPT | Mod: CPTII,S$GLB,, | Performed by: INTERNAL MEDICINE

## 2020-10-19 PROCEDURE — 86803 HEPATITIS C AB TEST: CPT

## 2020-10-19 PROCEDURE — 3008F BODY MASS INDEX DOCD: CPT | Mod: CPTII,S$GLB,, | Performed by: INTERNAL MEDICINE

## 2020-10-19 PROCEDURE — 99999 PR PBB SHADOW E&M-EST. PATIENT-LVL V: ICD-10-PCS | Mod: PBBFAC,,, | Performed by: INTERNAL MEDICINE

## 2020-10-19 PROCEDURE — 3008F PR BODY MASS INDEX (BMI) DOCUMENTED: ICD-10-PCS | Mod: CPTII,S$GLB,, | Performed by: INTERNAL MEDICINE

## 2020-10-19 PROCEDURE — 3075F PR MOST RECENT SYSTOLIC BLOOD PRESS GE 130-139MM HG: ICD-10-PCS | Mod: CPTII,S$GLB,, | Performed by: INTERNAL MEDICINE

## 2020-10-19 PROCEDURE — 82784 ASSAY IGA/IGD/IGG/IGM EACH: CPT | Mod: 59

## 2020-10-19 PROCEDURE — 3079F DIAST BP 80-89 MM HG: CPT | Mod: CPTII,S$GLB,, | Performed by: INTERNAL MEDICINE

## 2020-10-19 PROCEDURE — 86704 HEP B CORE ANTIBODY TOTAL: CPT

## 2020-10-19 PROCEDURE — 80048 BASIC METABOLIC PNL TOTAL CA: CPT

## 2020-10-19 PROCEDURE — 99999 PR PBB SHADOW E&M-EST. PATIENT-LVL V: CPT | Mod: PBBFAC,,, | Performed by: INTERNAL MEDICINE

## 2020-10-19 PROCEDURE — 3079F PR MOST RECENT DIASTOLIC BLOOD PRESSURE 80-89 MM HG: ICD-10-PCS | Mod: CPTII,S$GLB,, | Performed by: INTERNAL MEDICINE

## 2020-10-19 PROCEDURE — 87340 HEPATITIS B SURFACE AG IA: CPT

## 2020-10-19 PROCEDURE — 3075F SYST BP GE 130 - 139MM HG: CPT | Mod: CPTII,S$GLB,, | Performed by: INTERNAL MEDICINE

## 2020-10-19 PROCEDURE — 99214 OFFICE O/P EST MOD 30 MIN: CPT | Mod: S$GLB,,, | Performed by: INTERNAL MEDICINE

## 2020-10-19 PROCEDURE — 99499 RISK ADDL DX/OHS AUDIT: ICD-10-PCS | Mod: S$GLB,,, | Performed by: INTERNAL MEDICINE

## 2020-10-19 PROCEDURE — 99499 RISK ADDL DX/OHS AUDIT: ICD-10-PCS | Mod: ,,, | Performed by: INTERNAL MEDICINE

## 2020-10-19 PROCEDURE — 99214 PR OFFICE/OUTPT VISIT, EST, LEVL IV, 30-39 MIN: ICD-10-PCS | Mod: S$GLB,,, | Performed by: INTERNAL MEDICINE

## 2020-10-19 PROCEDURE — 99499 UNLISTED E&M SERVICE: CPT | Mod: ,,, | Performed by: INTERNAL MEDICINE

## 2020-10-19 PROCEDURE — 36415 COLL VENOUS BLD VENIPUNCTURE: CPT | Mod: PO

## 2020-10-19 PROCEDURE — 3051F PR MOST RECENT HEMOGLOBIN A1C LEVEL 7.0 - < 8.0%: ICD-10-PCS | Mod: CPTII,S$GLB,, | Performed by: INTERNAL MEDICINE

## 2020-10-19 RX ORDER — METHYLPHENIDATE HYDROCHLORIDE 10 MG/1
10 TABLET ORAL 2 TIMES DAILY WITH MEALS
Qty: 60 TABLET | Refills: 0 | Status: SHIPPED | OUTPATIENT
Start: 2020-10-19 | End: 2020-11-05 | Stop reason: SDUPTHER

## 2020-10-19 RX ORDER — METHYLPHENIDATE HYDROCHLORIDE 10 MG/1
10 TABLET ORAL 2 TIMES DAILY WITH MEALS
Qty: 60 TABLET | Refills: 0 | Status: ON HOLD | OUTPATIENT
Start: 2020-10-19 | End: 2020-10-30 | Stop reason: HOSPADM

## 2020-10-19 RX ORDER — HYDROCODONE BITARTRATE AND ACETAMINOPHEN 5; 325 MG/1; MG/1
1 TABLET ORAL EVERY 8 HOURS PRN
Qty: 42 TABLET | Refills: 0 | Status: SHIPPED | OUTPATIENT
Start: 2020-10-19 | End: 2020-11-02

## 2020-10-19 RX ORDER — MUPIROCIN 20 MG/G
OINTMENT TOPICAL 3 TIMES DAILY
Qty: 30 G | Refills: 0 | Status: ON HOLD | OUTPATIENT
Start: 2020-10-19 | End: 2021-09-05 | Stop reason: HOSPADM

## 2020-10-19 NOTE — ASSESSMENT & PLAN NOTE
- CT head with no acute abnormality.  - PT/OT, SW consulted for possible placement.  - Continue Seroquel 25mg qHS, donepezil 5mg qHS.    DELIRIUM BEHAVIOR MANAGEMENT  - Minimize use of restraints; if physical restraints necessary, please utilize medical/chemical prns for agitation.  - Keep shades open and room lit during day and room dim at night in order to promote healthy circadian rhythms.  - Encourage family at bedside  - Keep whiteboard in patient's room current with the date and name of the members of patient's team for easy patient self re-orientation.  - Avoid benzodiazepines, antihistamines, anticholinergics, hypnotics, and minimize opiates while controlling for pain as these medications may exacerbate delirium.   no

## 2020-10-19 NOTE — TELEPHONE ENCOUNTER
----- Message from Yared Garvin sent at 10/19/2020 12:25 PM CDT -----  Regarding: clarification on pain meds for patient  Type:  Pharmacy Calling to Clarify an RX    Name of Caller: Sharifa     Pharmacy Name: [facility]     Prescription Name: HYDROcodone-acetaminophen (NORCO) 5-325 mg per tablet    What do they need to clarify? Sharifa from [facility] is requesting a call back in regards to the patient's medication: HYDROcodone-acetaminophen (NORCO) 5-325 mg per tablet. She needs a diagnoses code and would also like to know why the patient was taken off of tramadol.    Can you be contacted via [facility]?no    Best Call Back Number: [phone]

## 2020-10-20 DIAGNOSIS — Z79.4 TYPE 2 DIABETES MELLITUS WITH DIABETIC POLYNEUROPATHY, WITH LONG-TERM CURRENT USE OF INSULIN: Chronic | ICD-10-CM

## 2020-10-20 DIAGNOSIS — E78.2 MIXED HYPERLIPIDEMIA: Primary | Chronic | ICD-10-CM

## 2020-10-20 DIAGNOSIS — E11.42 TYPE 2 DIABETES MELLITUS WITH DIABETIC POLYNEUROPATHY, WITH LONG-TERM CURRENT USE OF INSULIN: Chronic | ICD-10-CM

## 2020-10-20 LAB
HBV CORE AB SERPL QL IA: NEGATIVE
HBV SURFACE AB SER-ACNC: NEGATIVE M[IU]/ML
HBV SURFACE AG SERPL QL IA: NEGATIVE
HCV AB SERPL QL IA: NEGATIVE

## 2020-10-20 NOTE — PROGRESS NOTES
Metabolic panel creatinine improving, 1.3 from 1.6.  MANISH on CKD.  A1c not ideal 7.7 from previous 7.2.  Hepatitis C negative  Hep B negative.  Not immune  Results to pt/family via mychart. No changes to regimen.

## 2020-10-20 NOTE — PROGRESS NOTES
Patient, Bessy Nunez (MRN #[id number]), presented with a recent Estimated Glumerular Filtration Rate (EGFR) between 30 and 45 consistent with the definition of chronic kidney disease stage 3 - moderate (ICD10 - N18.3).    eGFR if non    Date Value Ref Range Status   10/19/2020 58.5 (A) >60 mL/min/1.73 m^2 Final     Comment:     Calculation used to obtain the estimated glomerular filtration  rate (eGFR) is the CKD-EPI equation.          The patient's chronic kidney disease stage 3 was monitored, evaluated, addressed and/or treated. This addendum to the medical record is made on 10/20/2020.

## 2020-10-21 ENCOUNTER — PATIENT MESSAGE (OUTPATIENT)
Dept: RHEUMATOLOGY | Facility: CLINIC | Age: 60
End: 2020-10-21

## 2020-10-21 ENCOUNTER — DOCUMENT SCAN (OUTPATIENT)
Dept: HOME HEALTH SERVICES | Facility: HOSPITAL | Age: 60
End: 2020-10-21
Payer: MEDICARE

## 2020-10-21 ENCOUNTER — TELEPHONE (OUTPATIENT)
Dept: FAMILY MEDICINE | Facility: CLINIC | Age: 60
End: 2020-10-21

## 2020-10-21 NOTE — TELEPHONE ENCOUNTER
Patient hospitalized in July for stroke felt to be secondary to small-vessel disease.  Can October patient had a one-day stay from the 11 to 12 for generalized weakness and a fall at home he was found to have urinary tract infection. Per primary care doctor know patient currently being treated for UTI and a rib fracture.  Will have staff schedule a follow-up visit in preparation for Rituxan if Neurology agrees with that plan.  Would wait 2 weeks at least after completing antibiotics before proceeding Rituxan.  Patient has a visit with Neurology on October 28th will await their input at that.

## 2020-10-27 ENCOUNTER — EXTERNAL HOME HEALTH (OUTPATIENT)
Dept: HOME HEALTH SERVICES | Facility: HOSPITAL | Age: 60
End: 2020-10-27
Payer: MEDICARE

## 2020-10-29 ENCOUNTER — HOSPITAL ENCOUNTER (OUTPATIENT)
Facility: HOSPITAL | Age: 60
Discharge: STILL ADMITTED OR ADMINISTRATIVE | End: 2020-10-29
Attending: INTERNAL MEDICINE | Admitting: INTERNAL MEDICINE
Payer: MEDICARE

## 2020-10-29 ENCOUNTER — HOSPITAL ENCOUNTER (EMERGENCY)
Facility: HOSPITAL | Age: 60
Discharge: LEFT BEFORE DISCHARGE | End: 2020-10-29
Attending: SURGERY
Payer: MEDICARE

## 2020-10-29 VITALS
DIASTOLIC BLOOD PRESSURE: 66 MMHG | OXYGEN SATURATION: 99 % | SYSTOLIC BLOOD PRESSURE: 132 MMHG | HEART RATE: 62 BPM | TEMPERATURE: 97 F | RESPIRATION RATE: 18 BRPM

## 2020-10-29 DIAGNOSIS — I63.9 STROKE: ICD-10-CM

## 2020-10-29 DIAGNOSIS — Z86.73 HISTORY OF CVA (CEREBROVASCULAR ACCIDENT): ICD-10-CM

## 2020-10-29 DIAGNOSIS — Z53.29 LEFT AGAINST MEDICAL ADVICE: Primary | ICD-10-CM

## 2020-10-29 DIAGNOSIS — R29.810 FACIAL DROOP: ICD-10-CM

## 2020-10-29 LAB
ALBUMIN SERPL BCP-MCNC: 3.6 G/DL (ref 3.5–5.2)
ALP SERPL-CCNC: 100 U/L (ref 55–135)
ALT SERPL W/O P-5'-P-CCNC: 25 U/L (ref 10–44)
ANION GAP SERPL CALC-SCNC: 12 MMOL/L (ref 8–16)
APTT BLDCRRT: 28.3 SEC (ref 21–32)
AST SERPL-CCNC: 32 U/L (ref 10–40)
BASOPHILS # BLD AUTO: 0.05 K/UL (ref 0–0.2)
BASOPHILS NFR BLD: 0.5 % (ref 0–1.9)
BILIRUB SERPL-MCNC: 0.5 MG/DL (ref 0.1–1)
BNP SERPL-MCNC: 39 PG/ML (ref 0–99)
BUN SERPL-MCNC: 26 MG/DL (ref 8–23)
CALCIUM SERPL-MCNC: 9 MG/DL (ref 8.7–10.5)
CHLORIDE SERPL-SCNC: 108 MMOL/L (ref 95–110)
CK MB SERPL-MCNC: 1.5 NG/ML (ref 0.1–6.5)
CK MB SERPL-RTO: 3.2 % (ref 0–5)
CK SERPL-CCNC: 47 U/L (ref 20–200)
CK SERPL-CCNC: 47 U/L (ref 20–200)
CO2 SERPL-SCNC: 22 MMOL/L (ref 23–29)
CREAT SERPL-MCNC: 1.6 MG/DL (ref 0.5–1.4)
DIFFERENTIAL METHOD: ABNORMAL
EOSINOPHIL # BLD AUTO: 0.2 K/UL (ref 0–0.5)
EOSINOPHIL NFR BLD: 2.3 % (ref 0–8)
ERYTHROCYTE [DISTWIDTH] IN BLOOD BY AUTOMATED COUNT: 13.6 % (ref 11.5–14.5)
EST. GFR  (AFRICAN AMERICAN): 53 ML/MIN/1.73 M^2
EST. GFR  (NON AFRICAN AMERICAN): 45 ML/MIN/1.73 M^2
GLUCOSE SERPL-MCNC: 216 MG/DL (ref 70–110)
HCT VFR BLD AUTO: 33.3 % (ref 40–54)
HGB BLD-MCNC: 11.4 G/DL (ref 14–18)
IMM GRANULOCYTES # BLD AUTO: 0.01 K/UL (ref 0–0.04)
IMM GRANULOCYTES NFR BLD AUTO: 0.1 % (ref 0–0.5)
INR PPP: 1 (ref 0.8–1.2)
LYMPHOCYTES # BLD AUTO: 1.5 K/UL (ref 1–4.8)
LYMPHOCYTES NFR BLD: 16.1 % (ref 18–48)
MCH RBC QN AUTO: 29.8 PG (ref 27–31)
MCHC RBC AUTO-ENTMCNC: 34.2 G/DL (ref 32–36)
MCV RBC AUTO: 87 FL (ref 82–98)
MONOCYTES # BLD AUTO: 0.9 K/UL (ref 0.3–1)
MONOCYTES NFR BLD: 9.8 % (ref 4–15)
NEUTROPHILS # BLD AUTO: 6.5 K/UL (ref 1.8–7.7)
NEUTROPHILS NFR BLD: 71.2 % (ref 38–73)
NRBC BLD-RTO: 0 /100 WBC
PLATELET # BLD AUTO: 208 K/UL (ref 150–350)
PMV BLD AUTO: 9 FL (ref 9.2–12.9)
POCT GLUCOSE: 210 MG/DL (ref 70–110)
POTASSIUM SERPL-SCNC: 3.9 MMOL/L (ref 3.5–5.1)
PROT SERPL-MCNC: 6.8 G/DL (ref 6–8.4)
PROTHROMBIN TIME: 11 SEC (ref 9–12.5)
RBC # BLD AUTO: 3.83 M/UL (ref 4.6–6.2)
SARS-COV-2 RDRP RESP QL NAA+PROBE: NEGATIVE
SODIUM SERPL-SCNC: 142 MMOL/L (ref 136–145)
TROPONIN I SERPL DL<=0.01 NG/ML-MCNC: <0.006 NG/ML (ref 0–0.03)
WBC # BLD AUTO: 9.14 K/UL (ref 3.9–12.7)

## 2020-10-29 PROCEDURE — 82553 CREATINE MB FRACTION: CPT

## 2020-10-29 PROCEDURE — 85730 THROMBOPLASTIN TIME PARTIAL: CPT

## 2020-10-29 PROCEDURE — G0425 PR INPT TELEHEALTH CONSULT 30M: ICD-10-PCS | Mod: GT,,, | Performed by: PSYCHIATRY & NEUROLOGY

## 2020-10-29 PROCEDURE — 85610 PROTHROMBIN TIME: CPT

## 2020-10-29 PROCEDURE — 82550 ASSAY OF CK (CPK): CPT

## 2020-10-29 PROCEDURE — 93010 EKG 12-LEAD: ICD-10-PCS | Mod: ,,, | Performed by: INTERNAL MEDICINE

## 2020-10-29 PROCEDURE — 99284 EMERGENCY DEPT VISIT MOD MDM: CPT | Mod: ,,, | Performed by: PHYSICIAN ASSISTANT

## 2020-10-29 PROCEDURE — 25000003 PHARM REV CODE 250: Performed by: SURGERY

## 2020-10-29 PROCEDURE — 93010 ELECTROCARDIOGRAM REPORT: CPT | Mod: ,,, | Performed by: INTERNAL MEDICINE

## 2020-10-29 PROCEDURE — 99285 EMERGENCY DEPT VISIT HI MDM: CPT | Mod: 25

## 2020-10-29 PROCEDURE — G0379 DIRECT REFER HOSPITAL OBSERV: HCPCS

## 2020-10-29 PROCEDURE — 80053 COMPREHEN METABOLIC PANEL: CPT

## 2020-10-29 PROCEDURE — 84484 ASSAY OF TROPONIN QUANT: CPT

## 2020-10-29 PROCEDURE — 93005 ELECTROCARDIOGRAM TRACING: CPT

## 2020-10-29 PROCEDURE — U0002 COVID-19 LAB TEST NON-CDC: HCPCS

## 2020-10-29 PROCEDURE — 99284 PR EMERGENCY DEPT VISIT,LEVEL IV: ICD-10-PCS | Mod: ,,, | Performed by: PHYSICIAN ASSISTANT

## 2020-10-29 PROCEDURE — G0425 INPT/ED TELECONSULT30: HCPCS | Mod: GT,,, | Performed by: PSYCHIATRY & NEUROLOGY

## 2020-10-29 PROCEDURE — 99285 EMERGENCY DEPT VISIT HI MDM: CPT | Mod: 25,27

## 2020-10-29 PROCEDURE — 82962 GLUCOSE BLOOD TEST: CPT

## 2020-10-29 PROCEDURE — 85025 COMPLETE CBC W/AUTO DIFF WBC: CPT

## 2020-10-29 PROCEDURE — 83880 ASSAY OF NATRIURETIC PEPTIDE: CPT

## 2020-10-29 PROCEDURE — G0378 HOSPITAL OBSERVATION PER HR: HCPCS

## 2020-10-29 RX ORDER — ASPIRIN 325 MG
325 TABLET ORAL
Status: COMPLETED | OUTPATIENT
Start: 2020-10-29 | End: 2020-10-29

## 2020-10-29 RX ADMIN — ASPIRIN 325 MG ORAL TABLET 325 MG: 325 PILL ORAL at 02:10

## 2020-10-29 NOTE — CONSULTS
[facility]  Vascular Neurology  Comprehensive Stroke Center  Tele-Consultation Note      Consults    Consulting Provider: NATALY VERGARA  Current Providers  No providers found    Patient Location:  [facility] EMERGENCY DEPARTMENT Emergency Department  Spoke hospital nurse at bedside with patient assisting consultant.     Patient information was obtained from patient and ED MD.         Assessment/Plan:   61 y/o with HTN, HLD, CNS vasculitis, recurrent cerebral infarcts (last stroke 7/30/20), brought in due to acute onset L face-L leg weakness that began 30 min prior to arrival.  NIHSS 2 (LIKELY OLD DEFICITS), CT head without acute abnormality.  Differential includes new small subcortical infarct vs stroke recrudesce. He is not a candidate for treatment with iv thrombolysis due to stroke < 3 months ago.  Recommend completing medical work up, MRI brain, and neurology consult.  Add Plavix to ASA if MRI confirms new stroke and no contraindication for DAPT.              STROKE DOCUMENTATION     Acute Stroke Times:   Acute Stroke Times   Last Known Normal Date: 10/29/20  Last Known Normal Time: 1237  Symptom Onset Date: 10/29/20  Symptom Onset Time: 1230  Stroke Team Called Date: 10/29/20  Stroke Team Called Time: 1305  Stroke Team Arrival Date: 10/29/20  Stroke Team Arrival Time: 1310  CT Interpretation Time: 1310  Decision to Treat Time for Alteplase: (No alteplase)  Decision to Treat Time for IR: (No IR)    NIH Scale:  Interval: baseline  1a. Level of Consciousness: 0-->Alert, keenly responsive  1b. LOC Questions: 0-->Answers both questions correctly  1c. LOC Commands: 0-->Performs both tasks correctly  2. Best Gaze: 0-->Normal  3. Visual: 0-->No visual loss  4. Facial Palsy: 0-->Normal symmetrical movements  5a. Motor Arm, Left: 0-->No drift, limb holds 90 (or 45) degrees for full 10 secs  5b. Motor Arm, Right: 1-->Drift, limb holds 90 (or 45) degrees, but drifts down before full 10  secs, does not hit bed or other support  6a. Motor Leg, Left: 0-->No drift, leg holds 30 degree position for full 5 secs  6b. Motor Leg, Right: 0-->No drift, leg holds 30 degree position for full 5 secs  7. Limb Ataxia: 0-->Absent  8. Sensory: 0-->Normal, no sensory loss  9. Best Language: 0-->No aphasia, normal  10. Dysarthria: 1-->Mild-to-moderate dysarthria, patient slurs at least some words and, at worst, can be understood with some difficulty  11. Extinction and Inattention (formerly Neglect): 0-->No abnormality  Total (NIH Stroke Scale): 2     Modified [place] Score: 0  Jarod Coma Scale:15   ABCD2 Score:    [id number] Score:   HAS -BLED Score:   ICH Score:   Hunt & Wilson Classification:       Diagnoses: L sided weakness. L face droop.  No new Assessment & Plan notes have been filed under this hospital service since the last note was generated.  Service: Vascular Neurology      There were no vitals taken for this visit.  Alteplase Eligible?: No  Alteplase Recommendation: Alteplase not recommended due to Outside of treatment window  and Recent previous stroke   Possible Interventional Revascularization Candidate? No; No significant neurological deficit    Disposition Recommendation: transfer to nearest appropriate facility     Subjective:     History of Present Illness: 59 y/o with HTN, HLD, CNS vasculitis, recurrent cerebral infarcts (last stroke 7/30/20), brought in due to acute onset L face-L leg weakness that began 30 min prior to arrival.  On my exam he has mild R arm drift and mild dysarthria but no ostensible deficits in the L side.          No notes on file      Woke up with symptoms?: no    Recent bleeding noted: no  Does the patient take any Blood Thinners? no  Medications: Antiplatelets:  aspirin      Past Medical History: hypertension, hyperlipidemia, stroke and CNS vasculitis, cognitive impairment    Past Surgical History: intracranial or spinal surgery within the last 3 months    Family History:  no relevant history    Social History: no smoking, no drinking, no drugs    Allergies: No Known Allergies No known drug allergies    Review of Systems   Constitutional: Negative for diaphoresis and fever.   HENT: Negative for hearing loss, tinnitus and trouble swallowing.    Eyes: Negative for visual disturbance.   Respiratory: Negative for shortness of breath.    Cardiovascular: Negative for chest pain and palpitations.   Gastrointestinal: Negative for blood in stool and vomiting.   Endocrine: Negative for cold intolerance.   Musculoskeletal: Negative for neck pain and neck stiffness.   Skin: Negative for rash.   Allergic/Immunologic: Negative for immunocompromised state.   Neurological: Positive for facial asymmetry, speech difficulty and weakness. Negative for dizziness and numbness.   Hematological: Does not bruise/bleed easily.   Psychiatric/Behavioral: Negative for agitation, behavioral problems and confusion.     Objective:   Vitals: There were no vitals taken for this visit. BP: 114/57, Respiratory Rate: 16 and Heart Rate: 78    CT READ: Yes  No hemmorhage. No mass effect. No early infarct signs.     Physical Exam  Vitals signs and nursing note reviewed.   Constitutional:       Appearance: Normal appearance.   HENT:      Head: Normocephalic and atraumatic.      Nose: Nose normal.   Eyes:      Extraocular Movements: Extraocular movements intact.   Neck:      Musculoskeletal: Normal range of motion.   Cardiovascular:      Rate and Rhythm: Normal rate and regular rhythm.   Pulmonary:      Effort: No respiratory distress.   Abdominal:      General: There is no distension.   Genitourinary:     Comments: No performed  Musculoskeletal: Normal range of motion.   Skin:     Findings: No erythema or rash.   Neurological:      Mental Status: He is alert and oriented to person, place, and time.      Cranial Nerves: No cranial nerve deficit.      Sensory: No sensory deficit.      Motor: Weakness present.       Coordination: Coordination normal.   Psychiatric:         Mood and Affect: Mood normal.         Behavior: Behavior normal.               Recommended the emergency room physician to have a brief discussion with the patient and/or family if available regarding the risks and benefits of treatment, and to briefly document the occurrence of that discussion in his clinical encounter note.     The attending portion of this evaluation, treatment, and documentation was performed per Shree Biggs MD via audiovisual.    Billing code:  (non-intervention mild to moderate stroke, TIA, some mimics)    · This patient has a critical neurological condition/illness, with some potential for high morbidity and mortality.  · There is a moderate probability for acute neurological change leading to clinical and possibly life-threatening deterioration requiring highest level of physician preparedness for urgent intervention.  · Care was coordinated with other physicians involved in the patient's care.  · Radiologic studies and laboratory data were reviewed and interpreted, and plan of care was re-assessed based on the results.  · Diagnosis, treatment options and prognosis may have been discussed with the patient and/or family members or caregiver.      In your opinion, this was a: Tier 1 Van Negative    Consult End Time: 130 PM    Shree Biggs MD  Comprehensive Stroke Center  Vascular Neurology   [facility]

## 2020-10-29 NOTE — ED PROVIDER NOTES
[facility]                                                 Chief Complaint  60 y.o. male with Weakness and Facial Droop (LEFT SIDE FACIAL DROOPING)      History of Present Illness  Bessy Nunez presents to the emergency room with left facial droop  Patient with left facial droop for 15 minutes with slurred speech PTA  Patient has had several strokes before, last stroke was July 2020  Patient on exam has minimal facial droop at this time, normal speech  Wife also states he has bilateral leg weakness with the slurred speech  Patient has a long history of CNS vasculitis with multiple CVA history    The history is provided by the patient   device was not used during this ER visit    Past Medical History   -- Amblyopia    -- Cataract    -- CNS vasculitis    -- Depressive disorder, not elsewhere classified    -- Essential hypertension    -- Internuclear ophthalmoplegia of left eye    -- Lacunar stroke of left subthalamic region    -- Mixed hyperlipidemia    -- NAFLD (nonalcoholic fatty liver disease)    -- CHASE (nonalcoholic steatohepatitis)    -- Obesity    -- Stroke    -- Type 2 diabetes mellitus with diabetic polyneuropathy    -- Type 2 diabetes mellitus with hyperglycemia       Surgeries:  Colonoscopy, venous port, skin cancer excision  No Known Allergies     I have reviewed all of this patient's past medical, surgical, family, and social   histories as well as active allergies and medications documented in the  electronic medical record    Review of Systems and Physical Exam      Review of Systems  -- Constitution - no fever, denies fatigue, no weakness, no chills  -- Eyes - no tearing or redness, no visual disturbance  -- Ear, Nose - no tinnitus or earache, no nasal congestion or discharge  -- Mouth,Throat - no sore throat, no toothache, normal voice, normal swallowing  -- Respiratory - denies cough and congestion, no shortness of breath, no VALENTIN  -- Cardiovascular - denies  chest pain, no palpitations, denies claudication  -- Gastrointestinal - denies abdominal pain, nausea, vomiting, or diarrhea  -- Genitourinary - no dysuria, denies flank pain, no hematuria, no STD risk  -- Musculoskeletal - denies back pain, negative for trauma or injury  -- Neurological - left facial droop, stroke symptoms today  -- Skin - denies pallor, rash, or changes in skin. no hives or welts noted    Vital Signs  Blood pressure is 115/76 and his pulse is 88.   His respiration is 16 and oxygen saturation is 100%.     Physical Exam  -- Nursing note and vitals reviewed  -- Constitutional: Appears well-developed and well-nourished  -- Head: Atraumatic. Normocephalic. No obvious abnormality  -- Eyes: Pupils are equal and reactive to light. Normal conjunctiva and lids  -- Cardiac: Normal rate, regular rhythm and normal heart sounds  -- Pulmonary: Normal respiratory effort, breath sounds clear to auscultation  -- Abdominal: Soft, no tenderness. Normal bowel sounds. Normal liver edge  -- Musculoskeletal: Normal range of motion, no effusions. Joints stable   -- Neurological: No focal deficits. Showed good interaction with staff  -- Vascular: Posterior tibial, dorsalis pedis and radial pulses 2+ bilaterally      Emergency Room Course      Lab Results     K 3.9      CO2 22 (L)   BUN 26 (H)   CREATININE 1.6 (H)    (H)   ALKPHOS 100   AST 32   ALT 25   BILITOT 0.5   ALBUMIN 3.6   PROT 6.8   WBC 9.14   HGB 11.4 (L)   HCT 33.3 (L)      CPK 47   CPK 47   CPKMB 1.5   TROPONINI <0.006   INR 1.0   BNP 39   LACTATE 1.6   MG 2.0   TSH 3.144     EKG   -- The EKG findings today were without concerning findings from baseline     CT head  Age-appropriate generalized cerebral volume loss with moderate severe chronic microvascular ischemic disease with remote lacunar-type infarctions in the bilateral basal ganglia in bilateral cerebellar hemispheres, appearing similar to the most recent prior examination of  10/11/2020.  No definite new parenchymal hypoattenuation is seen.  Clinical correlation and further evaluation with MRI as warranted.     Additional Work up  -- Chest x-ray showed no infiltrate and showed no acute pathology    Medications Given  --  MG ASA given in today in the ER    Telemedicine Stroke Consult  -- Patient was seen by Vascular Neurology via telemedicine in the ER today  -- Please see the telemedicine notes for full evaluation of the consult in the ER       ED Physician Management  -- Diagnosis management comments: 60 y.o. male with left facial droop  -- patient with leg weakness and left facial droop, several past strokes   -- aspirin ASAP, head CT appear stable a telemedicine stroke consult  -- patient be transferred for MRI neurology evaluation for stroke symptoms  -- pt is currently not a tPA candidate, see telemedicine neurology note    Diagnosis  [R29.810] Facial droop  [Z86.73] History of CVA (cerebrovascular accident) (Primary)    Disposition and Plan  -- Disposition: transfer  -- Condition: stable    This note is dictated on M*Modal word recognition program.  There are word recognition mistakes that are occasionally missed on review.        Addendum  -- 7:03 PM:  Patient decided he wants to go home and is leaving against medical advice      Jayesh Cho MD  10/29/20 [id number]

## 2020-10-29 NOTE — PLAN OF CARE
(Physician in Lead of Transfers)   Outside Transfer Acceptance Note / Regional Referral Center    Name: Bessy Nunez    Transferring Physician: Jayesh Cho MD///Emergency Medicine    Accepting Physician: LEVI Andrade MD    Date of Acceptance:  October 29, 2020    Transferring Facility:  [facility]    Destination Facility and Admitting Physician:  [facility]///telemetry    Reason for Transfer:  Stroke evaluation    Report from Transferring Physician/Hospital course:  60-year-old male presented to the [facility] emergency department with left facial droop.  With that he had slurred speech.  He has a history of strokes with the most recent being in July of 2020.  Speech was normal on exam.  His wife reported that he had bilateral lower extremity weakness with slurred speech.  He has a history of CNS vasculitis with prior strokes.  CT of the head showed age-appropriate cerebral volume loss with moderate to severe chronic microvascular ischemic disease with remote lacunar infarcts in the bilateral basal ganglia and bilateral cerebellar hemispheres similar to the exam from October 11, 2020.  No definite new hypoattenuation seen.  In the emergency department he received aspirin.  He underwent vascular neurology evaluation.  Was felt not to be a candidate for IV thrombolytics.  Recommendation was for completing medical workup along with neurology consultation.  The ER physician felt the patient was stable for transfer at this time.    VS:  Heart rate 88, respirations 16, blood pressure 115/76, oxygen saturation 100% on room air    Labs: see epic    Radiographs: see epic    To Do List: Admit to     Upon patient arrival to floor, please send SecureChat to [facility] or call extension [phone] (if no answer, this will flip to a beeper, so enter your call back number) for Hospital Medicine admit team assignment and for additional admit orders for the patient.  Do not page the attending  physician associated with the patient on arrival (this physician may not be on duty at the time of arrival).  Rather, always call [phone] to reach the triage physician for orders and team assignment.      LEVI Andrade MD  [facility] Medicine Staff  Cell: [phone]

## 2020-10-29 NOTE — SUBJECTIVE & OBJECTIVE
Woke up with symptoms?: no    Recent bleeding noted: no  Does the patient take any Blood Thinners? no  Medications: Antiplatelets:  aspirin      Past Medical History: hypertension, hyperlipidemia, stroke and CNS vasculitis, cognitive impairment    Past Surgical History: intracranial or spinal surgery within the last 3 months    Family History: no relevant history    Social History: no smoking, no drinking, no drugs    Allergies: No Known Allergies No known drug allergies    Review of Systems   Constitutional: Negative for diaphoresis and fever.   HENT: Negative for hearing loss, tinnitus and trouble swallowing.    Eyes: Negative for visual disturbance.   Respiratory: Negative for shortness of breath.    Cardiovascular: Negative for chest pain and palpitations.   Gastrointestinal: Negative for blood in stool and vomiting.   Endocrine: Negative for cold intolerance.   Musculoskeletal: Negative for neck pain and neck stiffness.   Skin: Negative for rash.   Allergic/Immunologic: Negative for immunocompromised state.   Neurological: Positive for facial asymmetry, speech difficulty and weakness. Negative for dizziness and numbness.   Hematological: Does not bruise/bleed easily.   Psychiatric/Behavioral: Negative for agitation, behavioral problems and confusion.     Objective:   Vitals: There were no vitals taken for this visit. BP: 114/57, Respiratory Rate: 16 and Heart Rate: 78    CT READ: Yes  No hemmorhage. No mass effect. No early infarct signs.     Physical Exam  Vitals signs and nursing note reviewed.   Constitutional:       Appearance: Normal appearance.   HENT:      Head: Normocephalic and atraumatic.      Nose: Nose normal.   Eyes:      Extraocular Movements: Extraocular movements intact.   Neck:      Musculoskeletal: Normal range of motion.   Cardiovascular:      Rate and Rhythm: Normal rate and regular rhythm.   Pulmonary:      Effort: No respiratory distress.   Abdominal:      General: There is no  distension.   Genitourinary:     Comments: No performed  Musculoskeletal: Normal range of motion.   Skin:     Findings: No erythema or rash.   Neurological:      Mental Status: He is alert and oriented to person, place, and time.      Cranial Nerves: No cranial nerve deficit.      Sensory: No sensory deficit.      Motor: Weakness present.      Coordination: Coordination normal.   Psychiatric:         Mood and Affect: Mood normal.         Behavior: Behavior normal.

## 2020-10-30 ENCOUNTER — HOSPITAL ENCOUNTER (OUTPATIENT)
Facility: HOSPITAL | Age: 60
Discharge: HOME HEALTH | End: 2020-10-30
Attending: EMERGENCY MEDICINE | Admitting: HOSPITALIST
Payer: MEDICARE

## 2020-10-30 VITALS
HEART RATE: 65 BPM | WEIGHT: 216.69 LBS | HEIGHT: 70 IN | RESPIRATION RATE: 18 BRPM | SYSTOLIC BLOOD PRESSURE: 116 MMHG | OXYGEN SATURATION: 95 % | TEMPERATURE: 99 F | BODY MASS INDEX: 31.02 KG/M2 | DIASTOLIC BLOOD PRESSURE: 68 MMHG

## 2020-10-30 DIAGNOSIS — Z74.09 IMPAIRED FUNCTIONAL MOBILITY, BALANCE, GAIT, AND ENDURANCE: ICD-10-CM

## 2020-10-30 DIAGNOSIS — Z86.73 HISTORY OF STROKE: ICD-10-CM

## 2020-10-30 DIAGNOSIS — R47.81 SLURRED SPEECH: ICD-10-CM

## 2020-10-30 DIAGNOSIS — R29.818 TRANSIENT NEUROLOGICAL SYMPTOMS: Primary | ICD-10-CM

## 2020-10-30 DIAGNOSIS — E11.42 TYPE 2 DIABETES MELLITUS WITH DIABETIC POLYNEUROPATHY, WITH LONG-TERM CURRENT USE OF INSULIN: ICD-10-CM

## 2020-10-30 DIAGNOSIS — Z79.4 TYPE 2 DIABETES MELLITUS WITH DIABETIC POLYNEUROPATHY, WITH LONG-TERM CURRENT USE OF INSULIN: ICD-10-CM
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
## 2020-10-30 DIAGNOSIS — I77.6 CNS VASCULITIS: ICD-10-CM
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
## 2020-10-30 DIAGNOSIS — R53.1 LEFT-SIDED WEAKNESS: ICD-10-CM

## 2020-10-30 DIAGNOSIS — E78.2 MIXED HYPERLIPIDEMIA: ICD-10-CM

## 2020-10-30 DIAGNOSIS — E66.9 OBESITY (BMI 30-39.9): ICD-10-CM

## 2020-10-30 DIAGNOSIS — R07.9 CHEST PAIN: ICD-10-CM

## 2020-10-30 DIAGNOSIS — R53.1 WEAKNESS: ICD-10-CM

## 2020-10-30 LAB
ALBUMIN SERPL BCP-MCNC: 3.9 G/DL (ref 3.5–5.2)
ALP SERPL-CCNC: 109 U/L (ref 55–135)
ALT SERPL W/O P-5'-P-CCNC: 26 U/L (ref 10–44)
ANION GAP SERPL CALC-SCNC: 12 MMOL/L (ref 8–16)
AST SERPL-CCNC: 28 U/L (ref 10–40)
BACTERIA #/AREA URNS AUTO: NORMAL /HPF
BASOPHILS # BLD AUTO: 0.05 K/UL (ref 0–0.2)
BASOPHILS NFR BLD: 0.6 % (ref 0–1.9)
BILIRUB SERPL-MCNC: 0.5 MG/DL (ref 0.1–1)
BILIRUB UR QL STRIP: NEGATIVE
BUN SERPL-MCNC: 22 MG/DL (ref 8–23)
CALCIUM SERPL-MCNC: 9.2 MG/DL (ref 8.7–10.5)
CHLORIDE SERPL-SCNC: 112 MMOL/L (ref 95–110)
CLARITY UR REFRACT.AUTO: CLEAR
CO2 SERPL-SCNC: 20 MMOL/L (ref 23–29)
COLOR UR AUTO: ABNORMAL
CREAT SERPL-MCNC: 1.3 MG/DL (ref 0.5–1.4)
DIFFERENTIAL METHOD: ABNORMAL
EOSINOPHIL # BLD AUTO: 0.2 K/UL (ref 0–0.5)
EOSINOPHIL NFR BLD: 2.1 % (ref 0–8)
ERYTHROCYTE [DISTWIDTH] IN BLOOD BY AUTOMATED COUNT: 13.4 % (ref 11.5–14.5)
EST. GFR  (AFRICAN AMERICAN): >60 ML/MIN/1.73 M^2
EST. GFR  (NON AFRICAN AMERICAN): 58.5 ML/MIN/1.73 M^2
ESTIMATED AVG GLUCOSE: 163 MG/DL (ref 68–131)
GLUCOSE SERPL-MCNC: 153 MG/DL (ref 70–110)
GLUCOSE UR QL STRIP: NEGATIVE
HBA1C MFR BLD HPLC: 7.3 % (ref 4–5.6)
HCT VFR BLD AUTO: 35.1 % (ref 40–54)
HGB BLD-MCNC: 11.5 G/DL (ref 14–18)
HGB UR QL STRIP: ABNORMAL
HIV 1+2 AB+HIV1 P24 AG SERPL QL IA: NEGATIVE
HYALINE CASTS UR QL AUTO: 0 /LPF
IMM GRANULOCYTES # BLD AUTO: 0.03 K/UL (ref 0–0.04)
IMM GRANULOCYTES NFR BLD AUTO: 0.3 % (ref 0–0.5)
KETONES UR QL STRIP: NEGATIVE
LEUKOCYTE ESTERASE UR QL STRIP: NEGATIVE
LYMPHOCYTES # BLD AUTO: 1.2 K/UL (ref 1–4.8)
LYMPHOCYTES NFR BLD: 13.8 % (ref 18–48)
MAGNESIUM SERPL-MCNC: 1.8 MG/DL (ref 1.6–2.6)
MCH RBC QN AUTO: 29.1 PG (ref 27–31)
MCHC RBC AUTO-ENTMCNC: 32.8 G/DL (ref 32–36)
MCV RBC AUTO: 89 FL (ref 82–98)
MICROSCOPIC COMMENT: NORMAL
MONOCYTES # BLD AUTO: 1 K/UL (ref 0.3–1)
MONOCYTES NFR BLD: 11.9 % (ref 4–15)
NEUTROPHILS # BLD AUTO: 6.1 K/UL (ref 1.8–7.7)
NEUTROPHILS NFR BLD: 71.3 % (ref 38–73)
NITRITE UR QL STRIP: NEGATIVE
NRBC BLD-RTO: 0 /100 WBC
PH UR STRIP: 5 [PH] (ref 5–8)
PHOSPHATE SERPL-MCNC: 3.1 MG/DL (ref 2.7–4.5)
PLATELET # BLD AUTO: 195 K/UL (ref 150–350)
PMV BLD AUTO: 9.1 FL (ref 9.2–12.9)
POCT GLUCOSE: 132 MG/DL (ref 70–110)
POCT GLUCOSE: 139 MG/DL (ref 70–110)
POTASSIUM SERPL-SCNC: 3.7 MMOL/L (ref 3.5–5.1)
PROT SERPL-MCNC: 7.1 G/DL (ref 6–8.4)
PROT UR QL STRIP: ABNORMAL
RBC # BLD AUTO: 3.95 M/UL (ref 4.6–6.2)
RBC #/AREA URNS AUTO: 1 /HPF (ref 0–4)
SODIUM SERPL-SCNC: 144 MMOL/L (ref 136–145)
SP GR UR STRIP: 1.01 (ref 1–1.03)
URN SPEC COLLECT METH UR: ABNORMAL
VIT B12 SERPL-MCNC: 363 PG/ML (ref 210–950)
WBC # BLD AUTO: 8.6 K/UL (ref 3.9–12.7)
WBC #/AREA URNS AUTO: 2 /HPF (ref 0–5)

## 2020-10-30 PROCEDURE — 81001 URINALYSIS AUTO W/SCOPE: CPT

## 2020-10-30 PROCEDURE — 84207 ASSAY OF VITAMIN B-6: CPT

## 2020-10-30 PROCEDURE — 82607 VITAMIN B-12: CPT

## 2020-10-30 PROCEDURE — 87040 BLOOD CULTURE FOR BACTERIA: CPT

## 2020-10-30 PROCEDURE — 96372 THER/PROPH/DIAG INJ SC/IM: CPT

## 2020-10-30 PROCEDURE — 25000003 PHARM REV CODE 250: Performed by: PHYSICIAN ASSISTANT

## 2020-10-30 PROCEDURE — G0378 HOSPITAL OBSERVATION PER HR: HCPCS

## 2020-10-30 PROCEDURE — 84100 ASSAY OF PHOSPHORUS: CPT

## 2020-10-30 PROCEDURE — 86703 HIV-1/HIV-2 1 RESULT ANTBDY: CPT

## 2020-10-30 PROCEDURE — 84446 ASSAY OF VITAMIN E: CPT

## 2020-10-30 PROCEDURE — 99214 OFFICE O/P EST MOD 30 MIN: CPT | Mod: GC,,, | Performed by: PSYCHIATRY & NEUROLOGY

## 2020-10-30 PROCEDURE — 83735 ASSAY OF MAGNESIUM: CPT

## 2020-10-30 PROCEDURE — 99220 PR INITIAL OBSERVATION CARE,LEVL III: CPT | Mod: ,,, | Performed by: PHYSICIAN ASSISTANT

## 2020-10-30 PROCEDURE — 63600175 PHARM REV CODE 636 W HCPCS: Performed by: PHYSICIAN ASSISTANT

## 2020-10-30 PROCEDURE — 80053 COMPREHEN METABOLIC PANEL: CPT

## 2020-10-30 PROCEDURE — 99220 PR INITIAL OBSERVATION CARE,LEVL III: ICD-10-PCS | Mod: ,,, | Performed by: PHYSICIAN ASSISTANT

## 2020-10-30 PROCEDURE — 99214 PR OFFICE/OUTPT VISIT, EST, LEVL IV, 30-39 MIN: ICD-10-PCS | Mod: GC,,, | Performed by: PSYCHIATRY & NEUROLOGY

## 2020-10-30 PROCEDURE — 83036 HEMOGLOBIN GLYCOSYLATED A1C: CPT

## 2020-10-30 PROCEDURE — 86592 SYPHILIS TEST NON-TREP QUAL: CPT

## 2020-10-30 PROCEDURE — 84425 ASSAY OF VITAMIN B-1: CPT

## 2020-10-30 PROCEDURE — 85025 COMPLETE CBC W/AUTO DIFF WBC: CPT

## 2020-10-30 RX ORDER — CHOLECALCIFEROL (VITAMIN D3) 25 MCG
5000 TABLET ORAL DAILY
Status: DISCONTINUED | OUTPATIENT
Start: 2020-10-30 | End: 2020-10-30 | Stop reason: HOSPADM

## 2020-10-30 RX ORDER — IBUPROFEN 200 MG
16 TABLET ORAL
Status: DISCONTINUED | OUTPATIENT
Start: 2020-10-30 | End: 2020-10-30 | Stop reason: HOSPADM

## 2020-10-30 RX ORDER — INSULIN ASPART 100 [IU]/ML
8 INJECTION, SOLUTION INTRAVENOUS; SUBCUTANEOUS
Status: DISCONTINUED | OUTPATIENT
Start: 2020-10-30 | End: 2020-10-30 | Stop reason: HOSPADM

## 2020-10-30 RX ORDER — ATORVASTATIN CALCIUM 40 MG/1
40 TABLET, FILM COATED ORAL DAILY
Status: DISCONTINUED | OUTPATIENT
Start: 2020-10-30 | End: 2020-10-30 | Stop reason: HOSPADM

## 2020-10-30 RX ORDER — IPRATROPIUM BROMIDE AND ALBUTEROL SULFATE 2.5; .5 MG/3ML; MG/3ML
3 SOLUTION RESPIRATORY (INHALATION) EVERY 4 HOURS PRN
Status: DISCONTINUED | OUTPATIENT
Start: 2020-10-30 | End: 2020-10-30 | Stop reason: HOSPADM

## 2020-10-30 RX ORDER — OXYBUTYNIN CHLORIDE 5 MG/1
5 TABLET, EXTENDED RELEASE ORAL DAILY
Status: DISCONTINUED | OUTPATIENT
Start: 2020-10-30 | End: 2020-10-30 | Stop reason: HOSPADM

## 2020-10-30 RX ORDER — GLUCAGON 1 MG
1 KIT INJECTION
Status: DISCONTINUED | OUTPATIENT
Start: 2020-10-30 | End: 2020-10-30 | Stop reason: HOSPADM

## 2020-10-30 RX ORDER — IRBESARTAN 300 MG/1
300 TABLET ORAL NIGHTLY
Status: DISCONTINUED | OUTPATIENT
Start: 2020-10-30 | End: 2020-10-30 | Stop reason: HOSPADM

## 2020-10-30 RX ORDER — IBUPROFEN 200 MG
24 TABLET ORAL
Status: DISCONTINUED | OUTPATIENT
Start: 2020-10-30 | End: 2020-10-30 | Stop reason: HOSPADM

## 2020-10-30 RX ORDER — POLYETHYLENE GLYCOL 3350 17 G/17G
17 POWDER, FOR SOLUTION ORAL DAILY
Status: DISCONTINUED | OUTPATIENT
Start: 2020-10-30 | End: 2020-10-30 | Stop reason: HOSPADM

## 2020-10-30 RX ORDER — BISACODYL 10 MG
10 SUPPOSITORY, RECTAL RECTAL DAILY PRN
Status: DISCONTINUED | OUTPATIENT
Start: 2020-10-30 | End: 2020-10-30 | Stop reason: HOSPADM

## 2020-10-30 RX ORDER — ASPIRIN 81 MG/1
81 TABLET ORAL DAILY
Status: DISCONTINUED | OUTPATIENT
Start: 2020-10-30 | End: 2020-10-30 | Stop reason: HOSPADM

## 2020-10-30 RX ORDER — METHYLPHENIDATE HYDROCHLORIDE 10 MG/1
10 TABLET ORAL 2 TIMES DAILY WITH MEALS
Status: DISCONTINUED | OUTPATIENT
Start: 2020-10-30 | End: 2020-10-30 | Stop reason: HOSPADM

## 2020-10-30 RX ORDER — TALC
6 POWDER (GRAM) TOPICAL NIGHTLY PRN
Status: DISCONTINUED | OUTPATIENT
Start: 2020-10-30 | End: 2020-10-30 | Stop reason: HOSPADM

## 2020-10-30 RX ORDER — DONEPEZIL HYDROCHLORIDE 5 MG/1
5 TABLET, FILM COATED ORAL NIGHTLY
Status: DISCONTINUED | OUTPATIENT
Start: 2020-10-30 | End: 2020-10-30 | Stop reason: HOSPADM

## 2020-10-30 RX ORDER — DILTIAZEM HYDROCHLORIDE 240 MG/1
240 CAPSULE, COATED, EXTENDED RELEASE ORAL DAILY
Status: DISCONTINUED | OUTPATIENT
Start: 2020-10-30 | End: 2020-10-30 | Stop reason: HOSPADM

## 2020-10-30 RX ORDER — ATENOLOL 25 MG/1
50 TABLET ORAL DAILY
Status: DISCONTINUED | OUTPATIENT
Start: 2020-10-30 | End: 2020-10-30 | Stop reason: HOSPADM

## 2020-10-30 RX ORDER — INSULIN ASPART 100 [IU]/ML
0-5 INJECTION, SOLUTION INTRAVENOUS; SUBCUTANEOUS
Status: DISCONTINUED | OUTPATIENT
Start: 2020-10-30 | End: 2020-10-30 | Stop reason: HOSPADM

## 2020-10-30 RX ORDER — ONDANSETRON 8 MG/1
8 TABLET, ORALLY DISINTEGRATING ORAL EVERY 8 HOURS PRN
Status: DISCONTINUED | OUTPATIENT
Start: 2020-10-30 | End: 2020-10-30 | Stop reason: HOSPADM

## 2020-10-30 RX ORDER — ACETAMINOPHEN 325 MG/1
650 TABLET ORAL EVERY 4 HOURS PRN
Status: DISCONTINUED | OUTPATIENT
Start: 2020-10-30 | End: 2020-10-30 | Stop reason: HOSPADM

## 2020-10-30 RX ORDER — HYDROCODONE BITARTRATE AND ACETAMINOPHEN 5; 325 MG/1; MG/1
1 TABLET ORAL EVERY 8 HOURS PRN
Status: DISCONTINUED | OUTPATIENT
Start: 2020-10-30 | End: 2020-10-30 | Stop reason: HOSPADM

## 2020-10-30 RX ADMIN — ASPIRIN 81 MG: 81 TABLET, COATED ORAL at 02:10

## 2020-10-30 RX ADMIN — DILTIAZEM HYDROCHLORIDE 240 MG: 240 CAPSULE, COATED, EXTENDED RELEASE ORAL at 10:10

## 2020-10-30 RX ADMIN — ATORVASTATIN CALCIUM 40 MG: 10 TABLET, FILM COATED ORAL at 10:10

## 2020-10-30 RX ADMIN — INSULIN ASPART 8 UNITS: 100 INJECTION, SOLUTION INTRAVENOUS; SUBCUTANEOUS at 12:10

## 2020-10-30 RX ADMIN — Medication 5000 UNITS: at 10:10

## 2020-10-30 RX ADMIN — METHYLPHENIDATE HYDROCHLORIDE 10 MG: 10 TABLET ORAL at 10:10

## 2020-10-30 RX ADMIN — OXYBUTYNIN CHLORIDE 5 MG: 5 TABLET, EXTENDED RELEASE ORAL at 01:10

## 2020-10-30 RX ADMIN — ATENOLOL 50 MG: 50 TABLET ORAL at 10:10

## 2020-10-30 NOTE — HPI
59 y/o male with PMH of CNS vasculitis on Rituxan (last dose on 5/14/2020), HTN, T2DM, prior small vessel strokes (Last one on 7/31/2020) who presents to the ER after a fall with slurred speech and weakness(?). MRI brain showed no new infarct. Neurology consulted for evaluation given his history of CNS vasculitis.    Patient had presented to [facility] yesterday (10/29) after developed slurred speech , left facial droop. He was seen on Telestroke and was noted to minor deficits in his right arm with NIHSS of 2. CT head was unremarkable and no tPA was given. He was supposed to be transferred to [facility] but he left AMA and then had a fall at home. He denies any weakness or gait instability and is unclear why he fell. His wife then brought him to [facility] for Neurology evaluation. MRI brain showed no new infarct. Hitchcock of ischemic changes looked stable compared to prior MRI in July 2020. No new deficits noted. He hasn't received his next dose of Rituxan as yet as he was diagnosed with a UTI around 2 weeks ago and was on antiobiotics. Is due to follow up with Rheumatology after completing his antibiotic course for initiation of Rituxan.    Diagnosed with CNS vasculitis with Angiogram. LP at the time showed a protein of 93. Was previously on Cytoxan but was taken off it due to concerns about toxicity with long term use of this medication. He was then transitioned to Imuran but did no respond to it. Has been on Rituxan since the beginning of 2020 and is scheduled for a dose q4 months. Of note, wife says that she was told about increasing the frequency of his dose to q2-3 months.     Per the wife, at baseline, he has no weakness and interacts with his wife appropriately.

## 2020-10-30 NOTE — ED NOTES
Notified Tia in WAR room tele box [id number] was placed on pt NSR with heart rate 6 bpm was confirmed by Tia

## 2020-10-30 NOTE — SUBJECTIVE & OBJECTIVE
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 Love By mri.  Several active lesions, many old. 5/13: MRA brain/neck, MRI brain w/wo contrast show no vascular occlusion, multiple foci of hyperintensity in deep cerebral periventricular white matter in pattern of demyelinating process.  5/17: MRI spine performed, no spinal cord lesions. Hospitalization 6/2017. EEG was performed negative for seizures.  Repeat MRI showing new lesion, question whether this was demyelination versus CVA. 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 Findings compatible with a small acute lacunar infarct adjacent to the left frontal horn.  Nonspecific enhancement just inferior to this region and extending into the left basal ganglia, as well as within the inferior aspect of the left cerebellum.  No evidence of a focal mass. 2.  Extensive increased signal intensity involving the periventricular white matter compatible with demyelinating disease versus vasculitis. 3.  Clinical correlation and followup recommended. 4.  This reportedly flattened in the EPIC and the corpus callosum medical record system.    Mixed hyperlipidemia 11/9/2012    NAFLD (nonalcoholic fatty liver disease) 10/11/2013    CHASE (nonalcoholic steatohepatitis)     Obesity     Stroke     Type 2 diabetes mellitus with diabetic polyneuropathy, with long-term current use of insulin 5/14/2017    Type 2 diabetes mellitus with hyperglycemia, with long-term current use of insulin 10/11/2013       Past Surgical History:   Procedure Laterality Date    COLONOSCOPY N/A 5/21/2019    Procedure: COLONOSCOPY;  Surgeon: Alex Ascencio MD;  Location: [facility];  Service: Endoscopy;  Laterality: N/A;   confirmed appt-sp    INSERTION OF TUNNELED CENTRAL VENOUS CATHETER (CVC) WITH SUBCUTANEOUS PORT N/A 12/7/2018    Procedure: [id number];  Surgeon: Brucec Diagnostic Provider;  Location: [facility] OR [address] [place] [address];  Service: Radiology;  Laterality: N/A;    SKIN CANCER EXCISION         Review of patient's allergies indicates:  No Known Allergies    No current facility-administered medications on file prior to encounter.      Current Outpatient Medications on File Prior to Encounter   Medication Sig    alendronate (FOSAMAX) 70 MG tablet Take 1 tablet (70 mg total) by mouth every 7 days.    aspirin (ECOTRIN) 81 MG EC tablet Take 81 mg by mouth once daily.    atenoloL (TENORMIN) 50 MG tablet Take 1 tablet (50 mg total) by mouth once daily.    atorvastatin (LIPITOR) 40 MG tablet Take 1 tablet (40 mg total) by mouth once daily.    ciprofloxacin HCl (CIPRO) 500 MG tablet Take 1 tablet (500 mg total) by mouth every 12 (twelve) hours.    diltiaZEM (DILT-XR) 240 MG CDCR Take 1 capsule (240 mg total) by mouth once daily.    donepeziL (ARICEPT) 5 MG tablet Take 1 tablet (5 mg total) by mouth every evening.    insulin (LANTUS SOLOSTAR U-100 INSULIN) glargine 100 units/mL (3mL) SubQ pen Inject 25 Units into the skin 2 (two) times a day. Up to 40 bid with chemotherapy    insulin aspart U-100 (NOVOLOG) 100 unit/mL (3 mL) InPn pen Inject 10 Units into the skin before meals and at bedtime as needed (Hyperglycemia). Up to 20 BID with chemo    irbesartan (AVAPRO) 300 MG tablet Take 1 tablet (300 mg total) by mouth every evening.    omega-3 fatty acids-vitamin E (FISH OIL) 1,000 mg Cap Take 1 capsule by mouth 2 (two) times daily.    oxybutynin (DITROPAN-XL) 5 MG TR24 Take 1 tablet (5 mg total) by mouth once daily.    sertraline (ZOLOFT) 100 MG tablet 1.5 tablet daily    vitamin D 1000 units Tab Take 5 tablets (5,000 Units total) by mouth once daily.    [DISCONTINUED] methylphenidate HCl (RITALIN) 10 MG tablet Take 1 tablet  "(10 mg total) by mouth 2 (two) times daily with meals.    blood sugar diagnostic (TRUE METRIX GLUCOSE TEST STRIP) Strp Test blood sugar four times a day    FREESTYLE ARELY 14 DAY READER Misc GLUCOSE TESTING : FASTING AND PRIOR TO MEALS    FREESTYLE ARELY 14 DAY SENSOR Kit GLUCOSE TESTING 4 TIMES A DAY    HYDROcodone-acetaminophen (NORCO) 5-325 mg per tablet Take 1 tablet by mouth every 8 (eight) hours as needed for Pain.    lancets 30 gauge Misc Test blood sugar four times a day    liraglutide 0.6 mg/0.1 mL, 18 mg/3 mL, subq PNIJ (VICTOZA 3-TOMASZ) 0.6 mg/0.1 mL (18 mg/3 mL) PnIj pen Inject 1.8 mg into the skin once daily.    methylphenidate HCl (RITALIN) 10 MG tablet Take 1 tablet (10 mg total) by mouth 2 (two) times daily with meals.    mupirocin (BACTROBAN) 2 % ointment Apply topically 3 (three) times daily.    pen needle, diabetic 32 gauge x 5/32" Ndle USE 1 PEN NEEDLE 4 TIMES DAILY (  SINGLE  USE)    QUEtiapine (SEROQUEL) 25 MG Tab Take 1 tablet (25 mg total) by mouth every evening.    SUPREP BOWEL PREP KIT 17.5-3.13-1.6 gram SolR USE AS DIRECTED     Family History     Problem Relation (Age of Onset)    Cancer Father    Cataracts Mother    Diabetes Maternal Aunt    Heart disease Mother    Heart failure Mother    Hypertension Mother    Rheum arthritis Paternal Grandmother    Stroke Sister        Tobacco Use    Smoking status: Never Smoker    Smokeless tobacco: Never Used   Substance and Sexual Activity    Alcohol use: Yes     Alcohol/week: 0.0 standard drinks     Frequency: Never     Drinks per session: 1 or 2     Binge frequency: Never     Comment: only on occasion    Drug use: No    Sexual activity: Yes     Partners: Female     Review of Systems   Constitutional: Negative for activity change, chills and fever.   HENT: Positive for trouble swallowing and voice change. Negative for drooling.    Eyes: Negative for photophobia and visual disturbance.   Respiratory: Negative for chest tightness, " shortness of breath and wheezing.    Cardiovascular: Negative for chest pain, palpitations and leg swelling.   Gastrointestinal: Negative for abdominal pain, constipation, diarrhea, nausea and vomiting.   Genitourinary: Negative for dysuria, frequency, hematuria and urgency.        Urinary incontinence at baseline   Musculoskeletal: Positive for gait problem. Negative for arthralgias and back pain.   Skin: Negative for color change and rash.   Neurological: Positive for facial asymmetry, speech difficulty and weakness. Negative for dizziness, syncope, light-headedness, numbness and headaches.   Psychiatric/Behavioral: Positive for confusion. Negative for agitation. The patient is not nervous/anxious.      Objective:     Vital Signs (Most Recent):  Temp: 98.8 °F (37.1 °C) (10/30/20 1425)  Pulse: 65 (10/30/20 1425)  Resp: 18 (10/30/20 1425)  BP: 116/68 (10/30/20 1425)  SpO2: 95 % (10/30/20 1425) Vital Signs (24h Range):  Temp:  [97.4 °F (36.3 °C)-98.8 °F (37.1 °C)] 98.8 °F (37.1 °C)  Pulse:  [60-67] 65  Resp:  [18-20] 18  SpO2:  [95 %-100 %] 95 %  BP: (116-179)/(63-90) 116/68     Weight: 98.3 kg (216 lb 11.4 oz)  Body mass index is 31.09 kg/m².    Physical Exam  Vitals signs and nursing note reviewed.   Constitutional:       General: He is not in acute distress.     Appearance: He is well-developed. He is obese. He is not ill-appearing or toxic-appearing.   HENT:      Head: Normocephalic and atraumatic.      Mouth/Throat:      Mouth: Mucous membranes are moist.      Pharynx: Oropharynx is clear.   Eyes:      General: No scleral icterus.     Extraocular Movements: Extraocular movements intact.      Conjunctiva/sclera: Conjunctivae normal.   Neck:      Musculoskeletal: Normal range of motion and neck supple.   Cardiovascular:      Rate and Rhythm: Normal rate and regular rhythm.      Heart sounds: Normal heart sounds.   Pulmonary:      Effort: Pulmonary effort is normal. No respiratory distress.      Breath sounds:  Normal breath sounds. No wheezing.      Comments: Exam limited 2/2 pt's body habitus/immobilty.  Abdominal:      General: Bowel sounds are normal. There is no distension.      Palpations: Abdomen is soft.      Tenderness: There is no abdominal tenderness.   Musculoskeletal: Normal range of motion.         General: No tenderness.   Lymphadenopathy:      Cervical: No cervical adenopathy.   Skin:     General: Skin is warm and dry.      Capillary Refill: Capillary refill takes less than 2 seconds.      Findings: No rash.   Neurological:      General: No focal deficit present.      Mental Status: He is alert.      Cranial Nerves: No cranial nerve deficit or facial asymmetry.      Sensory: No sensory deficit.      Coordination: Coordination normal.      Comments: Slurred speech. Alert to person, place & situation; disoriented to date. Baseline confusion per wife.Able to follow commands without difficulty.   Psychiatric:         Speech: Speech is slurred.         Behavior: Behavior is slowed.         Thought Content: Thought content normal.         Judgment: Judgment normal.             Significant Labs:   CBC:   Recent Labs   Lab 10/29/20  1314 10/30/20  0859   WBC 9.14 8.60   HGB 11.4* 11.5*   HCT 33.3* 35.1*    195     CMP:   Recent Labs   Lab 10/29/20  1314 10/30/20  0859    144   K 3.9 3.7    112*   CO2 22* 20*   * 153*   BUN 26* 22   CREATININE 1.6* 1.3   CALCIUM 9.0 9.2   PROT 6.8 7.1   ALBUMIN 3.6 3.9   BILITOT 0.5 0.5   ALKPHOS 100 109   AST 32 28   ALT 25 26   ANIONGAP 12 12   EGFRNONAA 45* 58.5*     Urine Studies:   Recent Labs   Lab 10/30/20  1020   COLORU Straw   APPEARANCEUA Clear   PHUR 5.0   SPECGRAV 1.015   PROTEINUA 1+*   GLUCUA Negative   KETONESU Negative   BILIRUBINUA Negative   OCCULTUA 1+*   NITRITE Negative   LEUKOCYTESUR Negative   RBCUA 1   WBCUA 2   BACTERIA None   HYALINECASTS 0     All pertinent labs within the past 24 hours have been reviewed.    Significant Imaging:  I have reviewed all pertinent imaging results/findings within the past 24 hours.

## 2020-10-30 NOTE — ASSESSMENT & PLAN NOTE
Slurred speech  Left-sided weakness  Fall at home  Patient presents with LE weakness & gait imbalance causing a fall at home earlier today. He presented to [facility] yesterday (10/29) with slurred speech, left facial drop & L-sided weakness. Evaluated by [facility] neuro telemed who recommend transfer here for MRI & neuro rajendra however pt refused & left AMA. Unilateral weakness/facial dropping has resolved; reports slurred speech improving.     - symptoms mostly resolved; no focal neuro deficits or gait abnormalities on exam  - Infectious work-up unrevealing  - MRI without evidence of acute infarct  - Neuro consulted:   -- no further imaging warranted as there are no new deficits to suggest a vasculitis flare.  -- Follow up with Rheumatology  -- Follow up in MS clinic.  -- Continue Vit D  - encourage progressive mobility; up in chair/ ambulate w/ assistance  - fall/delirium precautions  - neuro checks q4h

## 2020-10-30 NOTE — CONSULTS
[facility]  Neurology  Consult Note    Patient Name: Bessy Nunez  MRN: [id number]  Admission Date: 10/30/2020  Hospital Length of Stay: 0 days  Code Status: Full Code   Attending Provider: Carlitos Ruvalcaba MD   Consulting Provider: Abe Kwok MD  Primary Care Physician: Edgar Nova MD  Principal Problem:<principal problem not specified>    Inpatient consult to neurology  Consult performed by: Abe Kwok MD  Consult ordered by: Lynne Garcia PA-C         Subjective:     Chief Complaint:  Slurred speech, falls.    HPI:   59 y/o male with PMH of CNS vasculitis on Rituxan (last dose on 5/14/2020), HTN, T2DM, prior small vessel strokes (Last one on 7/31/2020) who presents to the ER after a fall with slurred speech and weakness(?). MRI brain showed no new infarct. Neurology consulted for evaluation given his history of CNS vasculitis.    Patient had presented to [facility] yesterday (10/29) after developed slurred speech , left facial droop. He was seen on Telestroke and was noted to minor deficits in his right arm with NIHSS of 2. CT head was unremarkable and no tPA was given. [place] [facility] [place] and then had a fall at home. He denies any weakness or gait instability and is unclear why he fell. His wife then [place] [facility] [place] [place]. MRI brain showed no new infarct. [place] of ischemic changes looked stable compared to prior MRI in July 2020. No new deficits noted. He hasn't received his next dose of Rituxan as yet as he was diagnosed with a UTI around 2 weeks ago and was on antiobiotics. Is due to follow up with Rheumatology after completing his antibiotic course for initiation of Rituxan.    Diagnosed with CNS vasculitis with Angiogram. LP at the time showed a protein of 93. Was previously on Cytoxan but was taken off it due to concerns about toxicity with long term use of this medication. He was then  transitioned to Imuran but did no respond to it. Has been on Rituxan since the beginning of 2020 and is scheduled for a dose q4 months. Of note, wife says that she was told about increasing the frequency of his dose to q2-3 months.     Per the wife, at baseline, he has no weakness and interacts with his wife appropriately.     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 Love By mri.  Several active lesions, many old. 5/13: MRA brain/neck, MRI brain w/wo contrast show no vascular occlusion, multiple foci of hyperintensity in deep cerebral periventricular white matter in pattern of demyelinating process.  5/17: MRI spine performed, no spinal cord lesions. Hospitalization 6/2017. EEG was performed negative for seizures.  Repeat MRI showing new lesion, question whether this was demyelination versus CVA. 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 Findings compatible with a small acute lacunar infarct adjacent to the left frontal horn.  Nonspecific enhancement just inferior to this region and extending into the left basal ganglia, as well as within the inferior aspect of the left cerebellum.  No evidence of a focal mass. 2.  Extensive increased signal intensity involving the periventricular white matter compatible with demyelinating disease versus vasculitis. 3.  Clinical correlation and followup recommended. 4.  This reportedly flattened in the EPIC and the corpus callosum medical record system.    Mixed hyperlipidemia 11/9/2012    NAFLD (nonalcoholic fatty liver disease) 10/11/2013    CHASE (nonalcoholic steatohepatitis)     Obesity     Stroke     Type 2 diabetes mellitus with diabetic polyneuropathy, with long-term current use  of insulin 5/14/2017    Type 2 diabetes mellitus with hyperglycemia, with long-term current use of insulin 10/11/2013       Past Surgical History:   Procedure Laterality Date    COLONOSCOPY N/A 5/21/2019    Procedure: COLONOSCOPY;  Surgeon: Alex Ascencio MD;  Location: [facility];  Service: Endoscopy;  Laterality: N/A;  confirmed appt-sp    INSERTION OF TUNNELED CENTRAL VENOUS CATHETER (CVC) WITH SUBCUTANEOUS PORT N/A 12/7/2018    Procedure: [id number];  Surgeon: Luan Diagnostic Provider;  Location: [facility] OR [address] [place] [address];  Service: Radiology;  Laterality: N/A;    SKIN CANCER EXCISION         Review of patient's allergies indicates:  No Known Allergies    Current Neurological Medications: Rituximab, Donepezil    Current Facility-Administered Medications on File Prior to Encounter   Medication    [COMPLETED] aspirin tablet 325 mg     Current Outpatient Medications on File Prior to Encounter   Medication Sig    alendronate (FOSAMAX) 70 MG tablet Take 1 tablet (70 mg total) by mouth every 7 days.    aspirin (ECOTRIN) 81 MG EC tablet Take 81 mg by mouth once daily.    atenoloL (TENORMIN) 50 MG tablet Take 1 tablet (50 mg total) by mouth once daily.    atorvastatin (LIPITOR) 40 MG tablet Take 1 tablet (40 mg total) by mouth once daily.    blood sugar diagnostic (TRUE METRIX GLUCOSE TEST STRIP) Strp Test blood sugar four times a day    ciprofloxacin HCl (CIPRO) 500 MG tablet Take 1 tablet (500 mg total) by mouth every 12 (twelve) hours.    diltiaZEM (DILT-XR) 240 MG CDCR Take 1 capsule (240 mg total) by mouth once daily.    donepeziL (ARICEPT) 5 MG tablet Take 1 tablet (5 mg total) by mouth every evening.    FREESTYLE ARELY 14 DAY READER Misc GLUCOSE TESTING : FASTING AND PRIOR TO MEALS    FREESTYLE ARELY 14 DAY SENSOR Kit GLUCOSE TESTING 4 TIMES A DAY    HYDROcodone-acetaminophen (NORCO) 5-325 mg per tablet Take 1 tablet by mouth every 8 (eight) hours as needed for Pain.    insulin (LANTUS SOLOSTAR  "U-100 INSULIN) glargine 100 units/mL (3mL) SubQ pen Inject 25 Units into the skin 2 (two) times a day. Up to 40 bid with chemotherapy    insulin aspart U-100 (NOVOLOG) 100 unit/mL (3 mL) InPn pen Inject 10 Units into the skin before meals and at bedtime as needed (Hyperglycemia). Up to 20 BID with chemo    irbesartan (AVAPRO) 300 MG tablet Take 1 tablet (300 mg total) by mouth every evening.    lancets 30 gauge Misc Test blood sugar four times a day    liraglutide 0.6 mg/0.1 mL, 18 mg/3 mL, subq PNIJ (VICTOZA 3-TOMASZ) 0.6 mg/0.1 mL (18 mg/3 mL) PnIj pen Inject 1.8 mg into the skin once daily.    methylphenidate HCl (RITALIN) 10 MG tablet Take 1 tablet (10 mg total) by mouth 2 (two) times daily with meals.    methylphenidate HCl (RITALIN) 10 MG tablet Take 1 tablet (10 mg total) by mouth 2 (two) times daily with meals.    mupirocin (BACTROBAN) 2 % ointment Apply topically 3 (three) times daily.    omega-3 fatty acids-vitamin E (FISH OIL) 1,000 mg Cap Take 1 capsule by mouth 2 (two) times daily.    oxybutynin (DITROPAN-XL) 5 MG TR24 Take 1 tablet (5 mg total) by mouth once daily.    pen needle, diabetic 32 gauge x 5/32" Ndle USE 1 PEN NEEDLE 4 TIMES DAILY (  SINGLE  USE)    QUEtiapine (SEROQUEL) 25 MG Tab Take 1 tablet (25 mg total) by mouth every evening.    sertraline (ZOLOFT) 100 MG tablet 1.5 tablet daily    SUPREP BOWEL PREP KIT 17.5-3.13-1.6 gram SolR USE AS DIRECTED    vitamin D 1000 units Tab Take 5 tablets (5,000 Units total) by mouth once daily.     Family History     Problem Relation (Age of Onset)    Cancer Father    Cataracts Mother    Diabetes Maternal Aunt    Heart disease Mother    Heart failure Mother    Hypertension Mother    Rheum arthritis Paternal Grandmother    Stroke Sister        Tobacco Use    Smoking status: Never Smoker    Smokeless tobacco: Never Used   Substance and Sexual Activity    Alcohol use: Yes     Alcohol/week: 0.0 standard drinks     Frequency: Never     Drinks per " session: 1 or 2     Binge frequency: Never     Comment: only on occasion    Drug use: No    Sexual activity: Yes     Partners: Female     Review of Systems  Objective:     Vital Signs (Most Recent):  Temp: 98.1 °F (36.7 °C) (10/30/20 0945)  Pulse: 61 (10/30/20 0945)  Resp: 19 (10/30/20 0945)  BP: (!) 179/90 (10/30/20 0945)  SpO2: 99 % (10/30/20 0945) Vital Signs (24h Range):  Temp:  [97.4 °F (36.3 °C)-98.1 °F (36.7 °C)] 98.1 °F (36.7 °C)  Pulse:  [60-88] 61  Resp:  [16-20] 19  SpO2:  [98 %-100 %] 99 %  BP: (115-179)/(63-90) 179/90     Weight: 99.8 kg (220 lb)  Body mass index is 31.57 kg/m².    Physical Exam  Eyes:      Extraocular Movements: EOM normal.   Neurological:      Coordination: Finger-Nose-Finger Test normal.      Deep Tendon Reflexes:      Reflex Scores:       Bicep reflexes are 3+ on the right side and 3+ on the left side.       Brachioradialis reflexes are 3+ on the right side and 3+ on the left side.       Patellar reflexes are 3+ on the right side and 3+ on the left side.       Achilles reflexes are 2+ on the right side and 2+ on the left side.  Psychiatric:         Speech: Speech is slurred.         NEUROLOGICAL EXAMINATION:     MENTAL STATUS   Oriented to person.   Oriented to place.   Oriented to time.   Follows 1 step commands.   Speech: Normal.   Level of consciousness: alert    CRANIAL NERVES     CN III, IV, VI   Extraocular motions are normal.     CN V   Facial sensation intact.     CN VII   Facial expression full, symmetric.     CN XI   CN XI normal.     CN XII   CN XII normal.     MOTOR EXAM   Overall muscle tone: normal    Strength   Right deltoid: 5/5  Left deltoid: 5/5  Right biceps: 5/5  Left biceps: 5/5  Right triceps: 5/5  Left triceps: 5/5  Right iliopsoas: 5/5  Left iliopsoas: 5/5  Right quadriceps: 5/5  Left quadriceps: 5/5  Right hamstrin/5  Left hamstrin/5  Right anterior tibial: 5/5  Right gastroc: 5/5  Left gastroc: 5/5    REFLEXES     Reflexes   Right brachioradialis:  3+  Left brachioradialis: 3+  Right biceps: 3+  Left biceps: 3+  Right patellar: 3+  Left patellar: 3+  Right achilles: 2+  Left achilles: 2+    SENSORY EXAM   Light touch normal.     GAIT AND COORDINATION      Coordination   Finger to nose coordination: normal  Gait normal.      Significant Labs:   CBC:   Recent Labs   Lab 10/29/20  1314 10/30/20  0859   WBC 9.14 8.60   HGB 11.4* 11.5*   HCT 33.3* 35.1*    195     CMP:   Recent Labs   Lab 10/29/20  1314 10/30/20  0859   * 153*    144   K 3.9 3.7    112*   CO2 22* 20*   BUN 26* 22   CREATININE 1.6* 1.3   CALCIUM 9.0 9.2   MG  --  1.8   PROT 6.8 7.1   ALBUMIN 3.6 3.9   BILITOT 0.5 0.5   ALKPHOS 100 109   AST 32 28   ALT 25 26   ANIONGAP 12 12   EGFRNONAA 45* 58.5*     All pertinent lab results from the past 24 hours have been reviewed.    Significant Imaging: I have reviewed all pertinent imaging results/findings within the past 24 hours.    Assessment and Plan:     CNS vasculitis failed imuran; failed cytoxan; 1/2020 rituxan  61 y/o male with history of CNS vasculitis (Dx in 2017), currently on Rituxan q4 months, small vessel stroke on ASA, T2DM, HTN, HLD who presented with slurred speech, left facial droop to [facility]. Was evaluated by Telestroke with low suspicion for an acute infarct. He left AMA and then sustained a fall and then [place] [facility] [place] Currently, his facial droop has resolved and his slurred speech is reportedly improving.     He was scheduled for his next dose of Rituxan earlier this month but it was delayed as he developed a UTI.    On exam, he has no focal deficits and his gait is normal. He is oriented to person, place and time. And follows commands.    Assessment and Recommendations:  -- No evidence of an infarct on MRI. Do not feel he needs contrast imaging as there are no new deficits to suggest a vasculitis flare.  -- Follow up with Rheumatology  -- Follow up in MS clinic.  -- Already has HH w/ PT in place  --  Continue Vit D    History of stroke 9/2017 L thalamus; 7/2020 R thalamus; felt to be due to small vessel disease, not vasculitis  -- History of prior small vessel stroke.  -- Continue ASA and statin  -- Strict control of vascular risk factors.        VTE Risk Mitigation (From admission, onward)         Ordered     IP VTE HIGH RISK PATIENT  Once      10/30/20 0741                Thank you for your consult. I will sign off. Please contact us if you have any additional questions.    Abe Kwok MD  Neurology  [facility]

## 2020-10-30 NOTE — ASSESSMENT & PLAN NOTE
Impaired functional mobility, gait, & endurance  - follow up w/ outpt rheumatologist ( Dr. Gregg) as previously scheduled  - see above

## 2020-10-30 NOTE — CARE UPDATE
Pt at [facility] and  assigned room on [facility] as part of  transfer process.  Pt decided to leave [place] AMA and upon doing so went home, reported falling, and then had wife drive him to [facility]. Pt leaving AMA from [place] was not communicated properly so that pt was not removed from assigned inpatient room at [facility].  When pt arrived at [facility], it appeared he still had an assigned room and was sent to that room.  Dr. Jacques notified of pt leaving AMA, driving to [facility], and now lying in bed of assigned room (924). Instructed by Dr Jacques to send pt to ER.  Pt informed of appropriate processes and taken to ED via wheelchair.  Unable to remove CSN or undo transfer admit since documentation present in chart.  Pt to be discharged and new admission/ encounter created.

## 2020-10-30 NOTE — PLAN OF CARE
Discharge instructions provided and reviewed with patient/ family  IV removed  Telemetry removed and returned  Pt properly dressed for weather  Pt ate prior to discharge  Pt medicated prior to discharge, refer to MAR for medication administration  Pt left via wheelchair safely out of hospital with son and all personal belongings  Ag Dallas 10/30/2020 6:10 PM

## 2020-10-30 NOTE — ASSESSMENT & PLAN NOTE
-- History of prior small vessel stroke.  -- Continue ASA and statin  -- Strict control of vascular risk factors.

## 2020-10-30 NOTE — SUBJECTIVE & OBJECTIVE
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 Love By mri.  Several active lesions, many old. 5/13: MRA brain/neck, MRI brain w/wo contrast show no vascular occlusion, multiple foci of hyperintensity in deep cerebral periventricular white matter in pattern of demyelinating process.  5/17: MRI spine performed, no spinal cord lesions. Hospitalization 6/2017. EEG was performed negative for seizures.  Repeat MRI showing new lesion, question whether this was demyelination versus CVA. 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 Findings compatible with a small acute lacunar infarct adjacent to the left frontal horn.  Nonspecific enhancement just inferior to this region and extending into the left basal ganglia, as well as within the inferior aspect of the left cerebellum.  No evidence of a focal mass. 2.  Extensive increased signal intensity involving the periventricular white matter compatible with demyelinating disease versus vasculitis. 3.  Clinical correlation and followup recommended. 4.  This reportedly flattened in the EPIC and the corpus callosum medical record system.    Mixed hyperlipidemia 11/9/2012    NAFLD (nonalcoholic fatty liver disease) 10/11/2013    CHASE (nonalcoholic steatohepatitis)     Obesity     Stroke     Type 2 diabetes mellitus with diabetic polyneuropathy, with long-term current use of insulin 5/14/2017    Type 2 diabetes mellitus with hyperglycemia, with long-term current use of insulin 10/11/2013       Past Surgical History:   Procedure Laterality Date    COLONOSCOPY N/A 5/21/2019    Procedure: COLONOSCOPY;  Surgeon: Alex Ascencio MD;  Location: [facility];  Service: Endoscopy;  Laterality: N/A;   confirmed appt-sp    INSERTION OF TUNNELED CENTRAL VENOUS CATHETER (CVC) WITH SUBCUTANEOUS PORT N/A 12/7/2018    Procedure: [id number];  Surgeon: Brucec Diagnostic Provider;  Location: [facility] OR [address] [place] [address];  Service: Radiology;  Laterality: N/A;    SKIN CANCER EXCISION         Review of patient's allergies indicates:  No Known Allergies    Current Neurological Medications: Rituximab, Donepezil    Current Facility-Administered Medications on File Prior to Encounter   Medication    [COMPLETED] aspirin tablet 325 mg     Current Outpatient Medications on File Prior to Encounter   Medication Sig    alendronate (FOSAMAX) 70 MG tablet Take 1 tablet (70 mg total) by mouth every 7 days.    aspirin (ECOTRIN) 81 MG EC tablet Take 81 mg by mouth once daily.    atenoloL (TENORMIN) 50 MG tablet Take 1 tablet (50 mg total) by mouth once daily.    atorvastatin (LIPITOR) 40 MG tablet Take 1 tablet (40 mg total) by mouth once daily.    blood sugar diagnostic (TRUE METRIX GLUCOSE TEST STRIP) Strp Test blood sugar four times a day    ciprofloxacin HCl (CIPRO) 500 MG tablet Take 1 tablet (500 mg total) by mouth every 12 (twelve) hours.    diltiaZEM (DILT-XR) 240 MG CDCR Take 1 capsule (240 mg total) by mouth once daily.    donepeziL (ARICEPT) 5 MG tablet Take 1 tablet (5 mg total) by mouth every evening.    FREESTYLE ARELY 14 DAY READER Misc GLUCOSE TESTING : FASTING AND PRIOR TO MEALS    FREESTYLE ARELY 14 DAY SENSOR Kit GLUCOSE TESTING 4 TIMES A DAY    HYDROcodone-acetaminophen (NORCO) 5-325 mg per tablet Take 1 tablet by mouth every 8 (eight) hours as needed for Pain.    insulin (LANTUS SOLOSTAR U-100 INSULIN) glargine 100 units/mL (3mL) SubQ pen Inject 25 Units into the skin 2 (two) times a day. Up to 40 bid with chemotherapy    insulin aspart U-100 (NOVOLOG) 100 unit/mL (3 mL) InPn pen Inject 10 Units into the skin before meals and at bedtime as needed (Hyperglycemia). Up to 20 BID with chemo    irbesartan  "(AVAPRO) 300 MG tablet Take 1 tablet (300 mg total) by mouth every evening.    lancets 30 gauge Misc Test blood sugar four times a day    liraglutide 0.6 mg/0.1 mL, 18 mg/3 mL, subq PNIJ (VICTOZA 3-TOMASZ) 0.6 mg/0.1 mL (18 mg/3 mL) PnIj pen Inject 1.8 mg into the skin once daily.    methylphenidate HCl (RITALIN) 10 MG tablet Take 1 tablet (10 mg total) by mouth 2 (two) times daily with meals.    methylphenidate HCl (RITALIN) 10 MG tablet Take 1 tablet (10 mg total) by mouth 2 (two) times daily with meals.    mupirocin (BACTROBAN) 2 % ointment Apply topically 3 (three) times daily.    omega-3 fatty acids-vitamin E (FISH OIL) 1,000 mg Cap Take 1 capsule by mouth 2 (two) times daily.    oxybutynin (DITROPAN-XL) 5 MG TR24 Take 1 tablet (5 mg total) by mouth once daily.    pen needle, diabetic 32 gauge x 5/32" Ndle USE 1 PEN NEEDLE 4 TIMES DAILY (  SINGLE  USE)    QUEtiapine (SEROQUEL) 25 MG Tab Take 1 tablet (25 mg total) by mouth every evening.    sertraline (ZOLOFT) 100 MG tablet 1.5 tablet daily    SUPREP BOWEL PREP KIT 17.5-3.13-1.6 gram SolR USE AS DIRECTED    vitamin D 1000 units Tab Take 5 tablets (5,000 Units total) by mouth once daily.     Family History     Problem Relation (Age of Onset)    Cancer Father    Cataracts Mother    Diabetes Maternal Aunt    Heart disease Mother    Heart failure Mother    Hypertension Mother    Rheum arthritis Paternal Grandmother    Stroke Sister        Tobacco Use    Smoking status: Never Smoker    Smokeless tobacco: Never Used   Substance and Sexual Activity    Alcohol use: Yes     Alcohol/week: 0.0 standard drinks     Frequency: Never     Drinks per session: 1 or 2     Binge frequency: Never     Comment: only on occasion    Drug use: No    Sexual activity: Yes     Partners: Female     Review of Systems  Objective:     Vital Signs (Most Recent):  Temp: 98.1 °F (36.7 °C) (10/30/20 0945)  Pulse: 61 (10/30/20 0945)  Resp: 19 (10/30/20 0945)  BP: (!) 179/90 (10/30/20 " 0945)  SpO2: 99 % (10/30/20 0945) Vital Signs (24h Range):  Temp:  [97.4 °F (36.3 °C)-98.1 °F (36.7 °C)] 98.1 °F (36.7 °C)  Pulse:  [60-88] 61  Resp:  [16-20] 19  SpO2:  [98 %-100 %] 99 %  BP: (115-179)/(63-90) 179/90     Weight: 99.8 kg (220 lb)  Body mass index is 31.57 kg/m².    Physical Exam  Eyes:      Extraocular Movements: EOM normal.   Neurological:      Coordination: Finger-Nose-Finger Test normal.      Deep Tendon Reflexes:      Reflex Scores:       Bicep reflexes are 3+ on the right side and 3+ on the left side.       Brachioradialis reflexes are 3+ on the right side and 3+ on the left side.       Patellar reflexes are 3+ on the right side and 3+ on the left side.       Achilles reflexes are 2+ on the right side and 2+ on the left side.  Psychiatric:         Speech: Speech is slurred.         NEUROLOGICAL EXAMINATION:     MENTAL STATUS   Oriented to person.   Oriented to place.   Oriented to time.   Follows 1 step commands.   Speech: slurred   Level of consciousness: alert    CRANIAL NERVES     CN III, IV, VI   Extraocular motions are normal.     CN V   Facial sensation intact.     CN VII   Facial expression full, symmetric.     CN XI   CN XI normal.     CN XII   CN XII normal.     MOTOR EXAM   Overall muscle tone: normal    Strength   Right deltoid: 5/5  Left deltoid: 5/5  Right biceps: 5/5  Left biceps: 5/5  Right triceps: 5/5  Left triceps: 5/5  Right iliopsoas: 5/5  Left iliopsoas: 5/5  Right quadriceps: 5/5  Left quadriceps: 5/5  Right hamstrin/5  Left hamstrin/5  Right anterior tibial: 5/5  Right gastroc: 5/5  Left gastroc: 5/5    REFLEXES     Reflexes   Right brachioradialis: 3+  Left brachioradialis: 3+  Right biceps: 3+  Left biceps: 3+  Right patellar: 3+  Left patellar: 3+  Right achilles: 2+  Left achilles: 2+    SENSORY EXAM   Light touch normal.     GAIT AND COORDINATION      Coordination   Finger to nose coordination: normal      Significant Labs:   CBC:   Recent Labs   Lab  10/29/20  1314 10/30/20  0859   WBC 9.14 8.60   HGB 11.4* 11.5*   HCT 33.3* 35.1*    195     CMP:   Recent Labs   Lab 10/29/20  1314 10/30/20  0859   * 153*    144   K 3.9 3.7    112*   CO2 22* 20*   BUN 26* 22   CREATININE 1.6* 1.3   CALCIUM 9.0 9.2   MG  --  1.8   PROT 6.8 7.1   ALBUMIN 3.6 3.9   BILITOT 0.5 0.5   ALKPHOS 100 109   AST 32 28   ALT 25 26   ANIONGAP 12 12   EGFRNONAA 45* 58.5*     All pertinent lab results from the past 24 hours have been reviewed.    Significant Imaging: I have reviewed all pertinent imaging results/findings within the past 24 hours.

## 2020-10-30 NOTE — HPI
Bessy Nunez is a 60 y.o. male with PMHx of HTN, HLD, DM2, CNS vasculitis on Rituxan (last dose on 5/14/2020), and hx multiple small vessel strokes (most recent 07/2020) who presents to [facility] with complaints of LE weakness & gait instability. Patient presented to [facility] yesterday (10/29) with slurred speech, left facial droop and left-sided weakness. CTH unremarkable, no tPA given. Evaluated by Telestroke, recommended transfer to [facility] for MRI & neurology eval but pt refused & left AMA. He then sustained a fall at home which prompted visit to ED today. Per wife, patient with notable weakness from baseline & shuffling upon arrival home yesterday. He fell onto his right-side when attempting to ambulate, no reported head trauma or LOC. Patient denies LE weakness or gait instability and unsure how/why he fell. He reports improvement in slurred speech and resolution of L-sided weakness & facial droop. He denies fever, chills, CP, SOB, N/V, abdominal pain, urinary symptoms, dizziness, and syncope.    ED: Afebrile and HDS. Hypertensive to SBP 160s, improved w/ home meds. Hgb stable. No significant electrolyte abnormalities. Infectious work-up unrevealing. MRI without evidence of acute infarct. Patient admitted to hospital medicine service for further evaluation and management.

## 2020-10-30 NOTE — ED PROVIDER NOTES
Encounter Date: 10/29/2020       History     Chief Complaint   Patient presents with    Fatigue     Pt reports he has been feeling some weakness and had slurred speech earlier this evening after leaving [facility]. Pt hebert has slightly slurred speech. Pt VAN negative.      60-year-old male arrives to the ER via POV.  The patient was initially supposed to be transferred from outside facility for evaluation by the Neurology Service, MRI, Hospital Medicine admission.  The patient presented to outside facility with left-sided facial droop and weakness.  Symptoms were intermittent and inconsistent.  Workup there was not revealing for acute pathology.  A tele stroke was completed.  The plan was to transfer the patient here for an MRI and admission to medicine with a neurology consult.  The patient left the outside facility against medical advice as he did not want to transfer with EMS.  The patient arrived here and went directly to the floor for admission.  That admission status was rejected when the patient left the outside facility he gets a advice.  The patient then re-presented to the ED.          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 Love By mri.  Several active lesions, many old. 5/13: MRA brain/neck, MRI brain w/wo contrast show no vascular occlusion, multiple foci of hyperintensity in deep cerebral periventricular white matter in pattern of demyelinating process.  5/17: MRI spine performed, no spinal cord lesions. Hospitalization 6/2017. EEG was performed negative for seizures.  Repeat MRI showing new lesion, question whether this was demyelination versus CVA. 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 Findings compatible with a small acute lacunar infarct adjacent to the left frontal horn.  Nonspecific enhancement just inferior to this region and extending into the left basal ganglia, as well as within the inferior aspect of the left cerebellum.  No evidence of a focal mass. 2.  Extensive increased signal intensity involving the periventricular white matter compatible with demyelinating disease versus vasculitis. 3.  Clinical correlation and followup recommended. 4.  This reportedly flattened in the EPIC and the corpus callosum medical record system.    Mixed hyperlipidemia 11/9/2012    NAFLD (nonalcoholic fatty liver disease) 10/11/2013    CHASE (nonalcoholic steatohepatitis)     Obesity     Stroke     Type 2 diabetes mellitus with diabetic polyneuropathy, with long-term current use of insulin 5/14/2017    Type 2 diabetes mellitus with hyperglycemia, with long-term current use of insulin 10/11/2013     Past Surgical History:   Procedure Laterality Date    COLONOSCOPY N/A 5/21/2019    Procedure: COLONOSCOPY;  Surgeon: Alex Ascencio MD;  Location: [facility];  Service: Endoscopy;  Laterality: N/A;  confirmed appt-sp    INSERTION OF TUNNELED CENTRAL VENOUS CATHETER (CVC) WITH SUBCUTANEOUS PORT N/A 12/7/2018    Procedure: [id number];  Surgeon: Luan Diagnostic Provider;  Location: [facility] OR [address] [place] [address];  Service: Radiology;  Laterality: N/A;    SKIN CANCER EXCISION       Family History   Problem Relation Age of Onset    Heart disease Mother     Hypertension Mother     Heart failure Mother     Cataracts Mother     Cancer Father         lung    Diabetes Maternal Aunt     Stroke Sister     Rheum arthritis Paternal Grandmother     Amblyopia Neg Hx     Blindness Neg Hx     Glaucoma Neg Hx     Macular degeneration Neg Hx     Retinal detachment Neg Hx     Strabismus Neg Hx      Social History     Tobacco Use    Smoking status: Never Smoker     Smokeless tobacco: Never Used   Substance Use Topics    Alcohol use: Yes     Alcohol/week: 0.0 standard drinks     Frequency: Never     Drinks per session: 1 or 2     Binge frequency: Never     Comment: only on occasion    Drug use: No     Review of Systems   Constitutional: Negative for fever.   HENT: Negative for sore throat.    Respiratory: Negative for shortness of breath.    Cardiovascular: Negative for chest pain.   Gastrointestinal: Negative for nausea.   Genitourinary: Negative for dysuria.   Musculoskeletal: Negative for back pain.   Skin: Negative for rash.   Neurological: Positive for facial asymmetry and weakness.   Hematological: Does not bruise/bleed easily.       Physical Exam     Initial Vitals [10/29/20 2331]   BP Pulse Resp Temp SpO2   (!) 166/78 65 20 98 °F (36.7 °C) 100 %      MAP       --         Physical Exam    Constitutional: Vital signs are normal. He appears well-developed and well-nourished. He is not diaphoretic. No distress.   HENT:   Head: Normocephalic and atraumatic.   Right Ear: Hearing normal.   Left Ear: Hearing normal.   Eyes: Conjunctivae are normal.   Cardiovascular: Normal rate and regular rhythm.   Abdominal: Soft. Normal appearance and bowel sounds are normal.   Musculoskeletal: Normal range of motion.   Neurological: He is alert and oriented to person, place, and time.   Chronic deficits, no acute focal abnormalities   Skin: Skin is warm and intact.   Psychiatric: He has a normal mood and affect. His speech is normal and behavior is normal. Cognition and memory are normal.         ED Course   Procedures  Labs Reviewed - No data to display       Imaging Results          MRI Brain Without Contrast (Final result)  Result time 10/30/20 04:40:55    Final result by Fabio Richard MD (10/30/20 04:40:55)                 Impression:      Diffuse ischemic changes involving the entire brain including the cerebellum.    No apparent hyperacute or acute ischemic insult is  appreciated.    All of the ischemic areas appear to be the by product of small-vessel involutional changes rather than a geographic infarction of the brain tissue secondary to occlusion of a major arterial vessel.      Electronically signed by: Fabio Richard  Date:    10/30/2020  Time:    04:40             Narrative:    EXAMINATION:  MRI BRAIN WITHOUT CONTRAST    CLINICAL HISTORY:  Neuro deficit, acute, stroke suspected; .    TECHNIQUE:  Noncontrast MRI imaging of the brain was performed utilizing standard imaging protocols..    COMPARISON:  CT scan of the brain dated October 29, 2020    FINDINGS:  The axial set images reveals atrophy on all imaging sequences.  Specifically on T2 and FLAIR weighted imaging sequences, there is significant small-vessel involutional changes of the deep white matter which is confluent along the border of the lateral ventricles.  There are additional areas of small vessel involutional changes in the deep white matter near the gray-white interface.  There is a lacunar infarct near the anterior aspect of the right lateral ventricle.  Diffusion-weighted imaging does not reveal evidence of a hyperacute or acute ischemic event.  It should be noted that there are similar small vessel involutional changes involving the peripheral aspects and central aspects of the right and left cerebellum.    No obvious evidence of a mass, edema or midline shift.  The ventricles are of normal size and contour.  No obvious space-occupying lesion in the cistern at the level of the posterior fossa.    The paranasal air passages are clear.  No obvious irregularity involving the contents of the orbits.                                 Medical Decision Making:   Initial Assessment:   60-year-old male with intermittent facial droop and weakness, now resolved  Differential Diagnosis:   Acute ischemia, chronic deficits, electrolyte derangement  Clinical Tests:   Radiological Study: Ordered and Reviewed  ED  Management:  Case discussed with Hospital Medicine and with transfer center.  An MRI was completed in the ER without findings of an acute stroke.  The patient will be admitted with Hospital Medicine with a neurology consult as originally planned.                             Clinical Impression:     ICD-10-CM ICD-9-CM   1. Weakness  R53.1 780.79                      Disposition:   Disposition: Admitted  Condition: Stable     ED Disposition Condition    Admit                             William Lobo PA-C  10/30/20 0451

## 2020-10-30 NOTE — ED NOTES
Pt identifiers Bessy Nunez were checked and are correct  LOC: The patient is awake, alert, aware of environment with an appropriate affect. Oriented x4, speaking appropriately  APPEARANCE: Pt resting comfortably, in no acute distress, pt is clean and well groomed, clothing properly fastened  SKIN: Skin warm, dry and intact, normal skin turgor, moist mucus membranes  RESPIRATORY: Airway is open and patent, respirations are spontaneous, even and unlabored, normal effort and rate Breath sounds clear effie to all lung fields on auscultation  CARDIAC: Normal rate and rhythm, no peripheral edema noted, capillary refill < 3 seconds, bilateral radial pulses 2+  ABDOMEN: Soft, nontender, nondistended. Bowel sounds present to all four quad of abd on auscultation  NEUROLOGIC:  facial expression is symmetrical, patient moving all extremities spontaneously, normal sensation in all extremities when touched with a finger.  Follows all commands appropriately  MUSCULOSKELETAL: No obvious deformities.

## 2020-10-30 NOTE — ED NOTES
Pt transported to room 745 A on tele box by stretcher with escort Pt condition stable on transport, pt belongings are with pt and pt's family notified of room number

## 2020-10-30 NOTE — PLAN OF CARE
[facility] and informed that the [facility] has been accepted by [facility].  Patient's nurse, Ag, informed of home health acceptance.

## 2020-10-30 NOTE — ASSESSMENT & PLAN NOTE
- hold home meds  - started on weight based insulin regimen: detemir 20U qhs, aspart 8U TIDWM  - LDSSI, ac/hs accuchecks  Lab Results   Component Value Date    HGBA1C 7.3 (H) 10/30/2020

## 2020-10-30 NOTE — H&P
[facility] Hospital Medicine  History & Physical    Patient Name: Bessy Nunez  MRN: [id number]  Admission Date: 10/30/2020  Attending Physician: Carlitos Ruvalcaba MD   Primary Care Provider: Edgar Nova MD    Hospital Medicine Team: [facility] HOSP MED E Lynne Garcia PA-C     Patient information was obtained from patient, spouse/SO and ER records.     Subjective:     Principal Problem:Transient neurological symptoms    Chief Complaint:   Chief Complaint   Patient presents with    Fatigue     Pt reports he has been feeling some weakness and had slurred speech earlier this evening after leaving [facility]. Pt hebert has slightly slurred speech. Pt VAN negative.         HPI: Bessy Nunez is a 60 y.o. male with PMHx of HTN, HLD, DM2, CNS vasculitis on Rituxan (last dose on 5/14/2020), and hx multiple small vessel strokes (most recent 07/2020) who presents to [facility] with complaints of LE weakness & gait instability. Patient presented to [facility] yesterday (10/29) with slurred speech, left facial droop and left-sided weakness. CTH unremarkable, no tPA given. Evaluated by Telestroke, recommended transfer to [facility] for MRI & neurology eval but pt refused & left AMA. He then sustained a fall at home which prompted visit to ED today. Per wife, patient with notable weakness from baseline & shuffling upon arrival home yesterday. He fell onto his right-side when attempting to ambulate, no reported head trauma or LOC. Patient denies LE weakness or gait instability and unsure how/why he fell. He reports improvement in slurred speech and resolution of L-sided weakness & facial droop. He denies fever, chills, CP, SOB, N/V, abdominal pain, urinary symptoms, dizziness, and syncope.    ED: Afebrile and HDS. Hypertensive to SBP 160s, improved w/ home meds. Hgb stable. No significant electrolyte abnormalities. Infectious work-up unrevealing. MRI without evidence of acute infarct. Patient admitted to hospital  medicine service for further evaluation and management.     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 Love By mri.  Several active lesions, many old. 5/13: MRA brain/neck, MRI brain w/wo contrast show no vascular occlusion, multiple foci of hyperintensity in deep cerebral periventricular white matter in pattern of demyelinating process.  5/17: MRI spine performed, no spinal cord lesions. Hospitalization 6/2017. EEG was performed negative for seizures.  Repeat MRI showing new lesion, question whether this was demyelination versus CVA. 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 Findings compatible with a small acute lacunar infarct adjacent to the left frontal horn.  Nonspecific enhancement just inferior to this region and extending into the left basal ganglia, as well as within the inferior aspect of the left cerebellum.  No evidence of a focal mass. 2.  Extensive increased signal intensity involving the periventricular white matter compatible with demyelinating disease versus vasculitis. 3.  Clinical correlation and followup recommended. 4.  This reportedly flattened in the EPIC and the corpus callosum medical record system.    Mixed hyperlipidemia 11/9/2012    NAFLD (nonalcoholic fatty liver disease) 10/11/2013    CHASE (nonalcoholic steatohepatitis)     Obesity     Stroke     Type 2 diabetes mellitus with diabetic polyneuropathy, with long-term current use of insulin 5/14/2017    Type 2 diabetes mellitus with hyperglycemia, with long-term current use of insulin 10/11/2013       Past Surgical History:   Procedure Laterality Date    COLONOSCOPY N/A 5/21/2019    Procedure: COLONOSCOPY;  Surgeon: Alex Ascencio MD;   Location: [facility];  Service: Endoscopy;  Laterality: N/A;  confirmed appt-sp    INSERTION OF TUNNELED CENTRAL VENOUS CATHETER (CVC) WITH SUBCUTANEOUS PORT N/A 12/7/2018    Procedure: [id number]-[id number];  Surgeon: Luan Diagnostic Provider;  Location: [facility] OR [address] [place] [address];  Service: Radiology;  Laterality: N/A;    SKIN CANCER EXCISION         Review of patient's allergies indicates:  No Known Allergies    No current facility-administered medications on file prior to encounter.      Current Outpatient Medications on File Prior to Encounter   Medication Sig    alendronate (FOSAMAX) 70 MG tablet Take 1 tablet (70 mg total) by mouth every 7 days.    aspirin (ECOTRIN) 81 MG EC tablet Take 81 mg by mouth once daily.    atenoloL (TENORMIN) 50 MG tablet Take 1 tablet (50 mg total) by mouth once daily.    atorvastatin (LIPITOR) 40 MG tablet Take 1 tablet (40 mg total) by mouth once daily.    ciprofloxacin HCl (CIPRO) 500 MG tablet Take 1 tablet (500 mg total) by mouth every 12 (twelve) hours.    diltiaZEM (DILT-XR) 240 MG CDCR Take 1 capsule (240 mg total) by mouth once daily.    donepeziL (ARICEPT) 5 MG tablet Take 1 tablet (5 mg total) by mouth every evening.    insulin (LANTUS SOLOSTAR U-100 INSULIN) glargine 100 units/mL (3mL) SubQ pen Inject 25 Units into the skin 2 (two) times a day. Up to 40 bid with chemotherapy    insulin aspart U-100 (NOVOLOG) 100 unit/mL (3 mL) InPn pen Inject 10 Units into the skin before meals and at bedtime as needed (Hyperglycemia). Up to 20 BID with chemo    irbesartan (AVAPRO) 300 MG tablet Take 1 tablet (300 mg total) by mouth every evening.    omega-3 fatty acids-vitamin E (FISH OIL) 1,000 mg Cap Take 1 capsule by mouth 2 (two) times daily.    oxybutynin (DITROPAN-XL) 5 MG TR24 Take 1 tablet (5 mg total) by mouth once daily.    sertraline (ZOLOFT) 100 MG tablet 1.5 tablet daily    vitamin D 1000 units Tab Take 5 tablets (5,000 Units total) by mouth once daily.     "[DISCONTINUED] methylphenidate HCl (RITALIN) 10 MG tablet Take 1 tablet (10 mg total) by mouth 2 (two) times daily with meals.    blood sugar diagnostic (TRUE METRIX GLUCOSE TEST STRIP) Strp Test blood sugar four times a day    FREESTYLE ARELY 14 DAY READER Misc GLUCOSE TESTING : FASTING AND PRIOR TO MEALS    FREESTYLE ARELY 14 DAY SENSOR Kit GLUCOSE TESTING 4 TIMES A DAY    HYDROcodone-acetaminophen (NORCO) 5-325 mg per tablet Take 1 tablet by mouth every 8 (eight) hours as needed for Pain.    lancets 30 gauge Misc Test blood sugar four times a day    liraglutide 0.6 mg/0.1 mL, 18 mg/3 mL, subq PNIJ (VICTOZA 3-TOMASZ) 0.6 mg/0.1 mL (18 mg/3 mL) PnIj pen Inject 1.8 mg into the skin once daily.    methylphenidate HCl (RITALIN) 10 MG tablet Take 1 tablet (10 mg total) by mouth 2 (two) times daily with meals.    mupirocin (BACTROBAN) 2 % ointment Apply topically 3 (three) times daily.    pen needle, diabetic 32 gauge x 5/32" Ndle USE 1 PEN NEEDLE 4 TIMES DAILY (  SINGLE  USE)    QUEtiapine (SEROQUEL) 25 MG Tab Take 1 tablet (25 mg total) by mouth every evening.    SUPREP BOWEL PREP KIT 17.5-3.13-1.6 gram SolR USE AS DIRECTED     Family History     Problem Relation (Age of Onset)    Cancer Father    Cataracts Mother    Diabetes Maternal Aunt    Heart disease Mother    Heart failure Mother    Hypertension Mother    Rheum arthritis Paternal Grandmother    Stroke Sister        Tobacco Use    Smoking status: Never Smoker    Smokeless tobacco: Never Used   Substance and Sexual Activity    Alcohol use: Yes     Alcohol/week: 0.0 standard drinks     Frequency: Never     Drinks per session: 1 or 2     Binge frequency: Never     Comment: only on occasion    Drug use: No    Sexual activity: Yes     Partners: Female     Review of Systems   Constitutional: Negative for activity change, chills and fever.   HENT: Positive for trouble swallowing and voice change. Negative for drooling.    Eyes: Negative for photophobia and " visual disturbance.   Respiratory: Negative for chest tightness, shortness of breath and wheezing.    Cardiovascular: Negative for chest pain, palpitations and leg swelling.   Gastrointestinal: Negative for abdominal pain, constipation, diarrhea, nausea and vomiting.   Genitourinary: Negative for dysuria, frequency, hematuria and urgency.        Urinary incontinence at baseline   Musculoskeletal: Positive for gait problem. Negative for arthralgias and back pain.   Skin: Negative for color change and rash.   Neurological: Positive for facial asymmetry, speech difficulty and weakness. Negative for dizziness, syncope, light-headedness, numbness and headaches.   Psychiatric/Behavioral: Positive for confusion. Negative for agitation. The patient is not nervous/anxious.      Objective:     Vital Signs (Most Recent):  Temp: 98.8 °F (37.1 °C) (10/30/20 1425)  Pulse: 65 (10/30/20 1425)  Resp: 18 (10/30/20 1425)  BP: 116/68 (10/30/20 1425)  SpO2: 95 % (10/30/20 1425) Vital Signs (24h Range):  Temp:  [97.4 °F (36.3 °C)-98.8 °F (37.1 °C)] 98.8 °F (37.1 °C)  Pulse:  [60-67] 65  Resp:  [18-20] 18  SpO2:  [95 %-100 %] 95 %  BP: (116-179)/(63-90) 116/68     Weight: 98.3 kg (216 lb 11.4 oz)  Body mass index is 31.09 kg/m².    Physical Exam  Vitals signs and nursing note reviewed.   Constitutional:       General: He is not in acute distress.     Appearance: He is well-developed. He is obese. He is not ill-appearing or toxic-appearing.   HENT:      Head: Normocephalic and atraumatic.      Mouth/Throat:      Mouth: Mucous membranes are moist.      Pharynx: Oropharynx is clear.   Eyes:      General: No scleral icterus.     Extraocular Movements: Extraocular movements intact.      Conjunctiva/sclera: Conjunctivae normal.   Neck:      Musculoskeletal: Normal range of motion and neck supple.   Cardiovascular:      Rate and Rhythm: Normal rate and regular rhythm.      Heart sounds: Normal heart sounds.   Pulmonary:      Effort: Pulmonary  effort is normal. No respiratory distress.      Breath sounds: Normal breath sounds. No wheezing.      Comments: Exam limited 2/2 pt's body habitus/immobilty.  Abdominal:      General: Bowel sounds are normal. There is no distension.      Palpations: Abdomen is soft.      Tenderness: There is no abdominal tenderness.   Musculoskeletal: Normal range of motion.         General: No tenderness.   Lymphadenopathy:      Cervical: No cervical adenopathy.   Skin:     General: Skin is warm and dry.      Capillary Refill: Capillary refill takes less than 2 seconds.      Findings: No rash.   Neurological:      General: No focal deficit present.      Mental Status: He is alert.      Cranial Nerves: No cranial nerve deficit or facial asymmetry.      Sensory: No sensory deficit.      Coordination: Coordination normal.      Comments: Slurred speech. Alert to person, place & situation; disoriented to date. Baseline confusion per wife.Able to follow commands without difficulty.   Psychiatric:         Speech: Speech is slurred.         Behavior: Behavior is slowed.         Thought Content: Thought content normal.         Judgment: Judgment normal.             Significant Labs:   CBC:   Recent Labs   Lab 10/29/20  1314 10/30/20  0859   WBC 9.14 8.60   HGB 11.4* 11.5*   HCT 33.3* 35.1*    195     CMP:   Recent Labs   Lab 10/29/20  1314 10/30/20  0859    144   K 3.9 3.7    112*   CO2 22* 20*   * 153*   BUN 26* 22   CREATININE 1.6* 1.3   CALCIUM 9.0 9.2   PROT 6.8 7.1   ALBUMIN 3.6 3.9   BILITOT 0.5 0.5   ALKPHOS 100 109   AST 32 28   ALT 25 26   ANIONGAP 12 12   EGFRNONAA 45* 58.5*     Urine Studies:   Recent Labs   Lab 10/30/20  1020   COLORU Straw   APPEARANCEUA Clear   PHUR 5.0   SPECGRAV 1.015   PROTEINUA 1+*   GLUCUA Negative   KETONESU Negative   BILIRUBINUA Negative   OCCULTUA 1+*   NITRITE Negative   LEUKOCYTESUR Negative   RBCUA 1   WBCUA 2   BACTERIA None   HYALINECASTS 0     All pertinent labs within  the past 24 hours have been reviewed.    Significant Imaging: I have reviewed all pertinent imaging results/findings within the past 24 hours.    Assessment/Plan:     * Transient neurological symptoms  Slurred speech  Left-sided weakness  Fall at home  Patient presents with LE weakness & gait imbalance causing a fall at home earlier today. He presented to [facility] yesterday (10/29) with slurred speech, left facial drop & L-sided weakness. Evaluated by [facility] neuro telemed who recommend transfer here for MRI & neuro rajendra however pt refused & left AMA. Unilateral weakness/facial dropping has resolved; reports slurred speech improving.     - symptoms mostly resolved; no focal neuro deficits or gait abnormalities on exam  - Infectious work-up unrevealing  - MRI without evidence of acute infarct  - Neuro consulted:   -- no further imaging warranted as there are no new deficits to suggest a vasculitis flare.  -- Follow up with Rheumatology  -- Follow up in MS clinic.  -- Continue Vit D  - encourage progressive mobility; up in chair/ ambulate w/ assistance  - fall/delirium precautions  - neuro checks q4h    Chronic kidney disease, stage 3  - Cr 1.3/ GFR >60 on admit  - stable at baseline    History of stroke 9/2017 L thalamus; 7/2020 R thalamus; felt to be due to small vessel disease, not vasculitis  - Strict control of vascular risk factors   - continue ASA & statin    CNS vasculitis failed imuran; failed cytoxan; 1/2020 rituxan  Impaired functional mobility, gait, & endurance  - follow up w/ outpt rheumatologist ( Dr. Gregg) as previously scheduled  - see above    Type 2 diabetes mellitus with diabetic polyneuropathy, with long-term current use of insulin  - hold home meds  - started on weight based insulin regimen: detemir 20U qhs, aspart 8U TIDWM  - LDSSI, ac/hs accuchecks  Lab Results   Component Value Date    HGBA1C 7.3 (H) 10/30/2020         JOHN on CPAP  - CPAP qhs    Obesity (BMI 30-39.9)  - BMI 31.09  -  encouraged lifestyle modifications including diet & exercise recommendations    Mixed hyperlipidemia  - continue ASA & statin      VTE Risk Mitigation (From admission, onward)         Ordered     IP VTE HIGH RISK PATIENT  Once      10/30/20 0741                 Discussed with staff, Peg.  Lynne Garcia PA-C  Department of Hospital Medicine   [facility]

## 2020-10-30 NOTE — ED NOTES
Pt continually walking out of room, redirected back to room and reminded of plan of care each time. Pt now requesting to speak to physician and leave. Lynne GRAVES aware, states she will be down to speak to pt within 30 minutes.

## 2020-10-30 NOTE — ASSESSMENT & PLAN NOTE
61 y/o male with history of CNS vasculitis (Dx in 2017), currently on Rituxan q4 months, small vessel stroke on ASA, T2DM, HTN, HLD who presented with slurred speech, left facial droop to [facility]. Was evaluated by Telestroke with low suspicion for an acute infarct. He left AMA and then sustained a fall and then presented to [facility]. Currently, his facial droop has resolved and his slurred speech is reportedly improving.     He was scheduled for his next dose of Rituxan earlier this month but it was delayed as he developed a UTI.    On exam, he has no focal deficits and his gait is normal. He is oriented to person, place and time. And follows commands.    Assessment and Recommendations:  -- No evidence of an infarct on MRI. Do not feel he needs contrast imaging as there are no new deficits to suggest a vasculitis flare.  -- Follow up with Rheumatology  -- Follow up in MS clinic.  -- Already has HH w/ PT in place  -- Continue Vit D

## 2020-10-31 LAB — RPR SER QL: NORMAL

## 2020-11-02 ENCOUNTER — PATIENT MESSAGE (OUTPATIENT)
Dept: PSYCHIATRY | Facility: CLINIC | Age: 60
End: 2020-11-02

## 2020-11-03 LAB
A-TOCOPHEROL VIT E SERPL-MCNC: 1133 UG/DL (ref 500–1800)
PYRIDOXAL SERPL-MCNC: 5 UG/L (ref 5–50)
VIT B1 BLD-MCNC: 54 UG/L (ref 38–122)

## 2020-11-04 LAB
BACTERIA BLD CULT: NORMAL
BACTERIA BLD CULT: NORMAL

## 2020-11-05 ENCOUNTER — OFFICE VISIT (OUTPATIENT)
Dept: FAMILY MEDICINE | Facility: CLINIC | Age: 60
End: 2020-11-05
Payer: MEDICARE

## 2020-11-05 VITALS
BODY MASS INDEX: 31.09 KG/M2 | DIASTOLIC BLOOD PRESSURE: 76 MMHG | TEMPERATURE: 98 F | SYSTOLIC BLOOD PRESSURE: 112 MMHG | OXYGEN SATURATION: 98 % | HEART RATE: 80 BPM | HEIGHT: 70 IN
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
## 2020-11-05 DIAGNOSIS — E11.21 CONTROLLED TYPE 2 DIABETES MELLITUS WITH DIABETIC NEPHROPATHY, WITH LONG-TERM CURRENT USE OF INSULIN: ICD-10-CM

## 2020-11-05 DIAGNOSIS — R53.83 FATIGUE, UNSPECIFIED TYPE: ICD-10-CM

## 2020-11-05 DIAGNOSIS — R41.89 COGNITIVE IMPAIRMENT: ICD-10-CM

## 2020-11-05 DIAGNOSIS — I10 ESSENTIAL HYPERTENSION: ICD-10-CM

## 2020-11-05 DIAGNOSIS — Z79.4 TYPE 2 DIABETES MELLITUS WITH DIABETIC POLYNEUROPATHY, WITH LONG-TERM CURRENT USE OF INSULIN: ICD-10-CM

## 2020-11-05 DIAGNOSIS — N18.30 STAGE 3 CHRONIC KIDNEY DISEASE, UNSPECIFIED WHETHER STAGE 3A OR 3B CKD: ICD-10-CM

## 2020-11-05 DIAGNOSIS — Z74.09 IMPAIRED FUNCTIONAL MOBILITY, BALANCE, GAIT, AND ENDURANCE: ICD-10-CM

## 2020-11-05 DIAGNOSIS — Z79.4 CONTROLLED TYPE 2 DIABETES MELLITUS WITH DIABETIC NEPHROPATHY, WITH LONG-TERM CURRENT USE OF INSULIN: ICD-10-CM
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
## 2020-11-05 DIAGNOSIS — Z86.73 HISTORY OF STROKE: ICD-10-CM

## 2020-11-05 DIAGNOSIS — E78.2 MIXED HYPERLIPIDEMIA: ICD-10-CM

## 2020-11-05 DIAGNOSIS — F03.91 DEMENTIA WITH BEHAVIORAL DISTURBANCE, UNSPECIFIED DEMENTIA TYPE: ICD-10-CM

## 2020-11-05 DIAGNOSIS — S32.040S CLOSED COMPRESSION FRACTURE OF L4 VERTEBRA, SEQUELA: ICD-10-CM

## 2020-11-05 DIAGNOSIS — R29.818 TRANSIENT NEUROLOGICAL SYMPTOMS: Primary | ICD-10-CM

## 2020-11-05 DIAGNOSIS — D69.6 THROMBOCYTOPENIA, UNSPECIFIED: ICD-10-CM

## 2020-11-05 DIAGNOSIS — E11.42 TYPE 2 DIABETES MELLITUS WITH DIABETIC POLYNEUROPATHY, WITH LONG-TERM CURRENT USE OF INSULIN: ICD-10-CM

## 2020-11-05 PROCEDURE — 99999 PR PBB SHADOW E&M-EST. PATIENT-LVL V: ICD-10-PCS | Mod: PBBFAC,,, | Performed by: INTERNAL MEDICINE

## 2020-11-05 PROCEDURE — 99214 OFFICE O/P EST MOD 30 MIN: CPT | Mod: S$GLB,,, | Performed by: INTERNAL MEDICINE

## 2020-11-05 PROCEDURE — 3051F HG A1C>EQUAL 7.0%<8.0%: CPT | Mod: CPTII,S$GLB,, | Performed by: INTERNAL MEDICINE

## 2020-11-05 PROCEDURE — 99499 UNLISTED E&M SERVICE: CPT | Mod: S$GLB,,, | Performed by: INTERNAL MEDICINE

## 2020-11-05 PROCEDURE — 99999 PR PBB SHADOW E&M-EST. PATIENT-LVL V: CPT | Mod: PBBFAC,,, | Performed by: INTERNAL MEDICINE

## 2020-11-05 PROCEDURE — 3074F PR MOST RECENT SYSTOLIC BLOOD PRESSURE < 130 MM HG: ICD-10-PCS | Mod: CPTII,S$GLB,, | Performed by: INTERNAL MEDICINE

## 2020-11-05 PROCEDURE — 3008F PR BODY MASS INDEX (BMI) DOCUMENTED: ICD-10-PCS | Mod: CPTII,S$GLB,, | Performed by: INTERNAL MEDICINE

## 2020-11-05 PROCEDURE — 99499 RISK ADDL DX/OHS AUDIT: ICD-10-PCS | Mod: S$GLB,,, | Performed by: INTERNAL MEDICINE

## 2020-11-05 PROCEDURE — 3051F PR MOST RECENT HEMOGLOBIN A1C LEVEL 7.0 - < 8.0%: ICD-10-PCS | Mod: CPTII,S$GLB,, | Performed by: INTERNAL MEDICINE

## 2020-11-05 PROCEDURE — 3008F BODY MASS INDEX DOCD: CPT | Mod: CPTII,S$GLB,, | Performed by: INTERNAL MEDICINE

## 2020-11-05 PROCEDURE — 3078F PR MOST RECENT DIASTOLIC BLOOD PRESSURE < 80 MM HG: ICD-10-PCS | Mod: CPTII,S$GLB,, | Performed by: INTERNAL MEDICINE

## 2020-11-05 PROCEDURE — 3078F DIAST BP <80 MM HG: CPT | Mod: CPTII,S$GLB,, | Performed by: INTERNAL MEDICINE

## 2020-11-05 PROCEDURE — 3074F SYST BP LT 130 MM HG: CPT | Mod: CPTII,S$GLB,, | Performed by: INTERNAL MEDICINE

## 2020-11-05 PROCEDURE — 99214 PR OFFICE/OUTPT VISIT, EST, LEVL IV, 30-39 MIN: ICD-10-PCS | Mod: S$GLB,,, | Performed by: INTERNAL MEDICINE

## 2020-11-05 RX ORDER — INSULIN ASPART 100 [IU]/ML
10 INJECTION, SOLUTION INTRAVENOUS; SUBCUTANEOUS
Qty: 2 BOX | Refills: 11 | Status: SHIPPED | OUTPATIENT
Start: 2020-11-05 | End: 2020-11-17 | Stop reason: CLARIF

## 2020-11-05 RX ORDER — METHYLPHENIDATE HYDROCHLORIDE 10 MG/1
10 TABLET ORAL 2 TIMES DAILY WITH MEALS
Qty: 60 TABLET | Refills: 0 | Status: SHIPPED | OUTPATIENT
Start: 2020-11-05 | End: 2021-01-20 | Stop reason: SDUPTHER

## 2020-11-05 RX ORDER — INSULIN GLARGINE 100 [IU]/ML
25 INJECTION, SOLUTION SUBCUTANEOUS 2 TIMES DAILY
Qty: 2 BOX | Refills: 11 | Status: SHIPPED | OUTPATIENT
Start: 2020-11-05 | End: 2021-02-01 | Stop reason: SDUPTHER
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
## 2020-11-06 ENCOUNTER — TELEPHONE (OUTPATIENT)
Dept: FAMILY MEDICINE | Facility: CLINIC | Age: 60
End: 2020-11-06

## 2020-11-09 ENCOUNTER — TELEPHONE (OUTPATIENT)
Dept: NEUROLOGY | Facility: CLINIC | Age: 60
End: 2020-11-09

## 2020-11-10 ENCOUNTER — DOCUMENT SCAN (OUTPATIENT)
Dept: HOME HEALTH SERVICES | Facility: HOSPITAL | Age: 60
End: 2020-11-10
Payer: MEDICARE

## 2020-11-10 NOTE — DISCHARGE SUMMARY
[facility] Hospital Medicine  Discharge Summary      Patient Name: Bessy Nunez  MRN: [id number]  Admission Date: 10/30/2020  Hospital Length of Stay: 0 days  Discharge Date and Time: 10/30/2020  8:10 PM  Attending Physician: Carlitos Ruvalcaba  Discharging Provider: Lynne Garcia PA-C  Primary Care Provider: Edgar Nova MD  Hospital Medicine Team: [facility] HOSP MED E Lynne Garcia PA-C    HPI:   Bessy Nunez is a 60 y.o. male with PMHx of HTN, HLD, DM2, CNS vasculitis on Rituxan (last dose on 5/14/2020), and hx multiple small vessel strokes (most recent 07/2020) who presents to [facility] with complaints of LE weakness & gait instability. Patient presented to [facility] yesterday (10/29) with slurred speech, left facial droop and left-sided weakness. CTH unremarkable, no tPA given. Evaluated by [facility], recommended transfer to [facility] for MRI & neurology eval but pt refused & left AMA. He then sustained a fall at home which prompted visit to ED today. Per wife, patient with notable weakness from baseline & shuffling upon arrival home yesterday. He fell onto his right-side when attempting to ambulate, no reported head trauma or LOC. Patient denies LE weakness or gait instability and unsure how/why he fell. He reports improvement in slurred speech and resolution of L-sided weakness & facial droop. He denies fever, chills, CP, SOB, N/V, abdominal pain, urinary symptoms, dizziness, and syncope.    ED: Afebrile and HDS. Hypertensive to SBP 160s, improved w/ home meds. Hgb stable. No significant electrolyte abnormalities. Infectious work-up unrevealing. MRI without evidence of acute infarct. Patient admitted to hospital medicine service for further evaluation and management.     * No surgery found *      Hospital Course:   Mr. Nunez was admitted to hospital medicine service for transient neurological symptoms and fall at home. Symptoms mostly resolved on admission; no focal neuro deficits or  gait abnormalities. Infectious work-up unrevealing. MRI without evidence of acute infarct. Neuro consulted, no further imaging warranted as there are no new deficits to suggest a vasculitis flare. Recommend follow up with Rheumatology & MS clinic as previously scheduled. Continue Vit D supplementation. Symptoms resolved. Patient medically stable for discharge home. Recommend PCP follow up in 1-2 week. Patient verbalized understanding. All questions and concerns addressed.     Consults:   Consults (From admission, onward)        Status Ordering Provider     Inpatient consult to neurology  Once     Provider:  (Not yet assigned)    MARIBEL Hartmann          * Transient neurological symptoms  Slurred speech  Left-sided weakness  Fall at home  - symptoms mostly resolved; no focal neuro deficits or gait abnormalities on exam  - Infectious work-up unrevealing  - MRI without evidence of acute infarct  - Neuro consulted:   -- no further imaging warranted as there are no new deficits to suggest a vasculitis flare.  -- Follow up with Rheumatology  -- Follow up in MS clinic.  -- Continue Vit D  - encourage progressive mobility; up in chair/ ambulate w/ assistance  - PCP follow up in 1-2 weeks    Chronic kidney disease, stage 3  - Cr 1.3/ GFR >60 on admit  - stable at baseline    History of stroke 9/2017 L thalamus; 7/2020 R thalamus; felt to be due to small vessel disease, not vasculitis  - Strict control of vascular risk factors   - continue ASA & statin    CNS vasculitis failed imuran; failed cytoxan; 1/2020 rituxan  Impaired functional mobility, gait, & endurance  - follow up w/ outpt rheumatologist ( Dr. Gregg) as previously scheduled  - see above    Type 2 diabetes mellitus with diabetic polyneuropathy, with long-term current use of insulin  - hold home meds  - started on weight based insulin regimen: detemir 20U qhs, aspart 8U TIDWM  - LDSSI, ac/hs accuchecks  Lab Results   Component Value Date     HGBA1C 7.3 (H) 10/30/2020       JOHN on CPAP  - CPAP qhs    Obesity (BMI 30-39.9)  - BMI 31.09  - encouraged lifestyle modifications including diet & exercise recommendations    Mixed hyperlipidemia  - continue ASA & statin      Final Active Diagnoses:    Diagnosis Date Noted POA    PRINCIPAL PROBLEM:  Transient neurological symptoms [R29.818] 10/30/2020 Yes    Slurred speech [R47.81] 10/30/2020 Yes    Fall at home, initial encounter [W19.XXXA, Y92.009] 10/30/2020 Not Applicable    Chronic kidney disease, stage 3 [N18.30] 10/16/2020 Yes    Left-sided weakness [R53.1] 12/05/2017 Yes    History of stroke 9/2017 L thalamus; 7/2020 R thalamus; felt to be due to small vessel disease, not vasculitis [Z86.73] 09/14/2017 Not Applicable    Impaired functional mobility, balance, gait, and endurance [Z74.09] 06/14/2017 Yes    CNS vasculitis failed imuran; failed cytoxan; 1/2020 rituxan [I77.6] 06/02/2017 Yes    Type 2 diabetes mellitus with diabetic polyneuropathy, with long-term current use of insulin [E11.42, Z79.4] 05/14/2017 Not Applicable    Obesity (BMI 30-39.9) [E66.9] 10/11/2013 Yes    JOHN on CPAP [G47.33, Z99.89] 10/11/2013 Not Applicable    Mixed hyperlipidemia [E78.2] 11/09/2012 Yes      Problems Resolved During this Admission:       Discharged Condition: stable    Disposition: Home-Health Care [facility]    Follow Up:  Follow-up Information     Kay Troy MD In 1 week.    Specialty: Rheumatology  Why: follow up recent hospital admission; eval to possibly restart chemo  Contact information:  [address]  [facility] [id number] [phone]             Edgar Nova MD In 1 week.    Specialty: Internal Medicine  Why: As needed, If symptoms worsen  Contact information:  [address] [place] [address] [phone]             Derrick Agudelo MD In 1 week.    Specialties: Interventional Neurology, Vascular Neurology, Neurology  Why: f/u recent hospital admission  Contact information:  [address]  [facility] [address]  [phone]                 Patient Instructions:      Diet diabetic     Notify your health care provider if you experience any of the following:  temperature >100.4     Notify your health care provider if you experience any of the following:  persistent nausea and vomiting or diarrhea     Notify your health care provider if you experience any of the following:  severe uncontrolled pain     Notify your health care provider if you experience any of the following:  redness, tenderness, or signs of infection (pain, swelling, redness, odor or green/yellow discharge around incision site)     Notify your health care provider if you experience any of the following:  difficulty breathing or increased cough     Notify your health care provider if you experience any of the following:  severe persistent headache     Notify your health care provider if you experience any of the following:  worsening rash     Notify your health care provider if you experience any of the following:  persistent dizziness, light-headedness, or visual disturbances     Notify your health care provider if you experience any of the following:  increased confusion or weakness     Activity as tolerated       Significant Diagnostic Studies: Labs: All labs within the past 24 hours have been reviewed    Pending Diagnostic Studies:     None         Medications:  Reconciled Home Medications:      Medication List      CONTINUE taking these medications    alendronate 70 MG tablet  Commonly known as: FOSAMAX  Take 1 tablet (70 mg total) by mouth every 7 days.     aspirin 81 MG EC tablet  Commonly known as: ECOTRIN  Take 81 mg by mouth once daily.     atenoloL 50 MG tablet  Commonly known as: TENORMIN  Take 1 tablet (50 mg total) by mouth once daily.     atorvastatin 40 MG tablet  Commonly known as: LIPITOR  Take 1 tablet (40 mg total) by mouth once daily.     blood sugar diagnostic Strp  Commonly known as: TRUE METRIX  "GLUCOSE TEST STRIP  Test blood sugar four times a day     diltiaZEM 240 MG Cdcr  Commonly known as: DILT-XR  Take 1 capsule (240 mg total) by mouth once daily.     donepeziL 5 MG tablet  Commonly known as: ARICEPT  Take 1 tablet (5 mg total) by mouth every evening.     FREESTYLE ARELY 14 DAY READER Misc  Generic drug: flash glucose scanning reader  GLUCOSE TESTING : FASTING AND PRIOR TO MEALS     FREESTYLE ARELY 14 DAY SENSOR Kit  Generic drug: flash glucose sensor  GLUCOSE TESTING 4 TIMES A DAY     irbesartan 300 MG tablet  Commonly known as: AVAPRO  Take 1 tablet (300 mg total) by mouth every evening.     mupirocin 2 % ointment  Commonly known as: BACTROBAN  Apply topically 3 (three) times daily.     omega-3 fatty acids-vitamin E 1,000 mg Cap  Commonly known as: FISH OIL  Take 1 capsule by mouth 2 (two) times daily.     oxybutynin 5 MG Tr24  Commonly known as: DITROPAN-XL  Take 1 tablet (5 mg total) by mouth once daily.     pen needle, diabetic 32 gauge x 5/32" Ndle  USE 1 PEN NEEDLE 4 TIMES DAILY (  SINGLE  USE)     QUEtiapine 25 MG Tab  Commonly known as: SEROQUEL  Take 1 tablet (25 mg total) by mouth every evening.     sertraline 100 MG tablet  Commonly known as: ZOLOFT  1.5 tablet daily     SUPREP BOWEL PREP KIT 17.5-3.13-1.6 gram Solr  Generic drug: sodium,potassium,mag sulfates  USE AS DIRECTED     TRUEPLUS LANCETS 30 gauge Misc  Generic drug: lancets  Test blood sugar four times a day     VICTOZA 3-TOMASZ 0.6 mg/0.1 mL (18 mg/3 mL) Pnij pen  Generic drug: liraglutide 0.6 mg/0.1 mL (18 mg/3 mL) subq PNIJ  Inject 1.8 mg into the skin once daily.     vitamin D 1000 units Tab  Commonly known as: VITAMIN D3  Take 5 tablets (5,000 Units total) by mouth once daily.        STOP taking these medications    methylphenidate HCl 10 MG tablet  Commonly known as: RITALIN        ASK your doctor about these medications    HYDROcodone-acetaminophen 5-325 mg per tablet  Commonly known as: NORCO  Take 1 tablet by mouth every 8 " (eight) hours as needed for Pain.  Ask about: Should I take this medication?            Indwelling Lines/Drains at time of discharge:   Lines/Drains/Airways     None                 Time spent on the discharge of patient: 32 minutes  Patient was seen and examined on the date of discharge and determined to be suitable for discharge.       Discussed with staff, Peg Garcia PA-C  Department of Hospital Medicine  [facility]

## 2020-11-10 NOTE — ASSESSMENT & PLAN NOTE
Slurred speech  Left-sided weakness  Fall at home  - symptoms mostly resolved; no focal neuro deficits or gait abnormalities on exam  - Infectious work-up unrevealing  - MRI without evidence of acute infarct  - Neuro consulted:   -- no further imaging warranted as there are no new deficits to suggest a vasculitis flare.  -- Follow up with Rheumatology  -- Follow up in MS clinic.  -- Continue Vit D  - encourage progressive mobility; up in chair/ ambulate w/ assistance  - PCP follow up in 1-2 weeks

## 2020-11-10 NOTE — HOSPITAL COURSE
Mr. Nunez was admitted to hospital medicine service for transient neurological symptoms and fall at home. Symptoms mostly resolved on admission; no focal neuro deficits or gait abnormalities. Infectious work-up unrevealing. MRI without evidence of acute infarct. Neuro consulted, no further imaging warranted as there are no new deficits to suggest a vasculitis flare. Recommend follow up with Rheumatology & MS clinic as previously scheduled. Continue Vit D supplementation. Symptoms resolved. Patient medically stable for discharge home. Recommend PCP follow up in 1-2 week. Patient verbalized understanding. All questions and concerns addressed.

## 2020-11-11 ENCOUNTER — DOCUMENT SCAN (OUTPATIENT)
Dept: HOME HEALTH SERVICES | Facility: HOSPITAL | Age: 60
End: 2020-11-11
Payer: MEDICARE

## 2020-11-15 ENCOUNTER — DOCUMENT SCAN (OUTPATIENT)
Dept: HOME HEALTH SERVICES | Facility: HOSPITAL | Age: 60
End: 2020-11-15
Payer: MEDICARE

## 2020-11-17 ENCOUNTER — TELEPHONE (OUTPATIENT)
Dept: FAMILY MEDICINE | Facility: CLINIC | Age: 60
End: 2020-11-17

## 2020-11-17 ENCOUNTER — PATIENT MESSAGE (OUTPATIENT)
Dept: FAMILY MEDICINE | Facility: CLINIC | Age: 60
End: 2020-11-17

## 2020-11-17 DIAGNOSIS — Z79.4 TYPE 2 DIABETES MELLITUS WITH DIABETIC POLYNEUROPATHY, WITH LONG-TERM CURRENT USE OF INSULIN: Primary | ICD-10-CM

## 2020-11-17 DIAGNOSIS — E11.42 TYPE 2 DIABETES MELLITUS WITH DIABETIC POLYNEUROPATHY, WITH LONG-TERM CURRENT USE OF INSULIN: Primary | ICD-10-CM

## 2020-11-17 DIAGNOSIS — E11.21 CONTROLLED TYPE 2 DIABETES MELLITUS WITH DIABETIC NEPHROPATHY, WITH LONG-TERM CURRENT USE OF INSULIN: ICD-10-CM

## 2020-11-17 DIAGNOSIS — Z79.4 CONTROLLED TYPE 2 DIABETES MELLITUS WITH DIABETIC NEPHROPATHY, WITH LONG-TERM CURRENT USE OF INSULIN: ICD-10-CM

## 2020-11-17 RX ORDER — INSULIN LISPRO 100 [IU]/ML
INJECTION, SOLUTION INTRAVENOUS; SUBCUTANEOUS
Qty: 2 BOX | Refills: 11 | Status: SHIPPED | OUTPATIENT
Start: 2020-11-17 | End: 2021-02-02 | Stop reason: ALTCHOICE

## 2020-11-17 RX ORDER — INSULIN ASPART 100 [IU]/ML
10 INJECTION, SOLUTION INTRAVENOUS; SUBCUTANEOUS
Qty: 2 BOX | Refills: 11 | Status: SHIPPED | OUTPATIENT
Start: 2020-11-17 | End: 2021-02-01 | Stop reason: SDUPTHER

## 2020-11-17 NOTE — TELEPHONE ENCOUNTER
----- Message from Allegra Jackson sent at 11/17/2020  2:06 PM CST -----  Regarding: Patient Call Back  Contact: Wife- Lisseth  Type: Patient Call Back    Who called: Wife- Lisseth     What is the request in detail: Pt is requesting a call back in regards to the pt insulin    Can the clinic reply by [facility]?    Would the patient rather a call back or a response via [facility]? Call back     Best call back number: [phone]

## 2020-11-17 NOTE — TELEPHONE ENCOUNTER
Spoke with patient's wife and she informed me that the insurance want pay for the Humalog but will pay for Novolog.  She wants to know can this be ordered instead of the Humalog.

## 2020-11-17 NOTE — TELEPHONE ENCOUNTER
Spoke with patient's wife and informed her that provider has sent in both the Novolog and the Humalog. She can purchase which one the insurance company will cover.

## 2020-11-18 ENCOUNTER — DOCUMENT SCAN (OUTPATIENT)
Dept: HOME HEALTH SERVICES | Facility: HOSPITAL | Age: 60
End: 2020-11-18
Payer: MEDICARE

## 2020-12-04 ENCOUNTER — TELEPHONE (OUTPATIENT)
Dept: FAMILY MEDICINE | Facility: CLINIC | Age: 60
End: 2020-12-04

## 2020-12-04 NOTE — TELEPHONE ENCOUNTER
----- Message from Bridgette Kirkland sent at 12/4/2020 11:17 AM CST -----  Contact: [facility] [phone]  Type:  Pharmacy Calling to Clarify an RX    Name of Caller: Vivienne    Pharmacy Name: [facility]    Prescription Name: insulin aspart U-100 (NOVOLOG) 100 unit/mL (3 mL) InPn pen    What do they need to clarify? Directions on medication    Can you be contacted via [facility]?Call back    Best Call Back Number: [phone]

## 2020-12-04 NOTE — TELEPHONE ENCOUNTER
----- Message from Bridgette Kirkland sent at 12/4/2020 11:17 AM CST -----  Contact: [facility] [phone]  Type:  Pharmacy Calling to Clarify an RX    Name of Caller: Vivienne    Pharmacy Name: [facility]    Prescription Name: insulin aspart U-100 (NOVOLOG) 100 unit/mL (3 mL) InPn pen    What do they need to clarify? Directions on medication    Can you be contacted via [facility]?Call back    Best Call Back Number: [phone]

## 2020-12-05 ENCOUNTER — DOCUMENT SCAN (OUTPATIENT)
Dept: HOME HEALTH SERVICES | Facility: HOSPITAL | Age: 60
End: 2020-12-05
Payer: MEDICARE

## 2020-12-08 NOTE — TELEPHONE ENCOUNTER
Spoke with Beti RIOS in neurology and was informed that patient did not get Rituxan and does not look good now.  He is pale and eyes are closed with discussion.  He has lost 10 pounds over the lost 3 weeks. She will have him admitted for evaluation of failure to thrive.  Will need to rule out infection, dehydration and anemia as cause of symptoms.  If no cause found would benefit from Rituxan.    eMERGENCY dEPARTMENT eNCOUnter      CHIEF COMPLAINT    Chief Complaint   Patient presents with   • High Blood Sugar Symptomatic       HPI    Brandyn Peralta is a 16 year old male who has ADHD but otherwise healthy who presents with hyperglycemia. The patient was at baseline until last several weeks when he has had increased frequency of urination.  He took his blood sugar today and it was elevated in the 400s.  He has not had a history of hyperglycemia.  He otherwise feels normal.The patient denies any cough or shortness of breath. Patient denies any recent cold symptoms. Patient denies any dysuria. Patient denies any diarrhea.Patient denies any changes in medications or an abilities to take medication as prescribed.    ALLERGIES    ALLERGIES:  No Known Allergies    CURRENT MEDICATIONS    Current Facility-Administered Medications   Medication Dose Route Frequency Provider Last Rate Last Admin   • sodium chloride (NORMAL SALINE) 0.9 % bolus 1,000 mL  1,000 mL Intravenous Once Ozzie Sanchez  mL/hr at 12/07/20 1832 1,000 mL at 12/07/20 1832   • dextrose 50 % injection 25 g  25 g Intravenous PRN Ozzie Sanchez MD       • dextrose 50 % injection 12.5 g  12.5 g Intravenous PRN Ozzie Sanchez MD       • glucagon (GLUCAGEN) injection 1 mg  1 mg Intramuscular PRN Ozzie Sanchez MD       • dextrose (GLUTOSE) 40 % gel 15 g  15 g Oral PRN Ozzie Sanchez MD       • dextrose (GLUTOSE) 40 % gel 30 g  30 g Oral PRN Ozzie Sanchez MD       • insulin regular (human) (HumuLIN R, NovoLIN R) injection 10 Units  10 Units Subcutaneous TID AC Ozzie Sanchez MD         Current Outpatient Medications   Medication Sig Dispense Refill   • Dexmethylphenidate HCl (Focalin XR) 35 MG CAPSULE SR 24 HR Take 1 capsule by mouth every morning. 30 capsule 0   • dexmethylpheniDATE (FOCALIN XR) 15 MG 24 hr capsule Take 1 capsule by mouth daily (at noon). 30 capsule 0   • escitalopram  (LEXAPRO) 20 MG tablet Take 1 tablet by mouth every morning. 30 tablet 5   • escitalopram (LEXAPRO) 5 MG tablet Take 1 tablet by mouth every morning. 30 tablet 5   • topiramate (Topamax) 25 MG tablet Take 1 tablet by mouth nightly. 30 tablet 4   • clindamycin (CLEOCIN-T) 1 % pledgets Apply topically 2 times daily. 60 each 5   • Cholecalciferol 5000 units Cap Take 10,000 units daily 30 capsule    • influenza virus quadrivalent vaccine inactivated, PRESERVATIVE FREE, (FLUZONE QUADRIVALENT) 0.5 ML injection Inject 0.5 mLs into the muscle 1 time for 1 dose 0.5 mL 0   • tretinoin (RETIN-A) 0.1 % cream Apply topically once daily in the evening to the face. 40 g 0   • doxycycline monohydrate (MONODOX) 100 MG capsule Take 1 capsule by mouth twice daily, taking with food. 60 capsule 0   • vitamin - therapeutic multivitamins w/minerals (CENTRUM SILVER,THERA-M) Tab Take 1 tablet by mouth daily.         PAST MEDICAL HISTORY    Past Medical History:   Diagnosis Date   • ADHD (attention deficit hyperactivity disorder)    • Anxiety    • Asperger syndrome    • Sleep difficulties        SURGICAL HISTORY    History reviewed. No pertinent surgical history.    SOCIAL HISTORY    Social History     Tobacco Use   • Smoking status: Never Smoker   • Smokeless tobacco: Never Used   Substance Use Topics   • Alcohol use: No   • Drug use: No     Comment: No caffeine       FAMILY HISTORY    Family History   Problem Relation Age of Onset   • Asthma Mother    • Depression Father         mother reports similar traits as patient   • Psychiatric Father    • Other Father         Aspergers   • Learning disabilities Brother    • Cancer Maternal Grandmother         breast   • Cancer Maternal Grandfather         prostate   • Depression Maternal Grandfather    • High blood pressure Maternal Grandfather    • Substance abuse Maternal Grandfather    • Arthritis Paternal Grandmother    • High blood pressure Paternal Grandmother    • High cholesterol Paternal  Grandmother    • Depression Paternal Grandmother    • Heart disease Paternal Grandfather    • High blood pressure Paternal Grandfather    • High cholesterol Paternal Grandfather    • Depression Paternal Uncle         per mother, possible mild autism   • Bipolar disorder Neg Hx    • Schizophrenia Neg Hx        REVIEW OF SYSTEMS    Hyperglycemia     Constitutional:  Denies fever or chills.   Eyes:  Denies change in visual acuity.   HENT:  Denies nasal congestion or sore throat.   Respiratory:  Denies cough or shortness of breath.   Cardiovascular:  Denies chest pain or edema.   GI:  Denies abdominal pain, bloody stools or diarrhea.   :  Has been urinating more frequently Denies dysuria or other changes.   Musculoskeletal:  Denies back pain or joint pain.   Integument:  Denies rashes or wounds.   Neurologic:  Denies headache, focal weakness or sensory changes.   Endocrine:  Please see history of present illness  Lymphatic:  Denies swollen glands.   Psychiatric:  Denies depression or anxiety.     PHYSICAL EXAM    ED Triage Vitals   BP 12/07/20 1715 132/82   Heart Rate 12/07/20 1714 97   Resp --    Temp 12/07/20 1740 98.2 °F (36.8 °C)   SpO2 12/07/20 1708 94 %     Pulse Ox Interpretation:  Normal on room air  Constitutional:  Well developed, well nourished, no acute distress, and non-toxic appearance.   Eyes:  PERRL and conjunctiva normal.   HENT:  Atraumatic, external ears normal, and nose normal. There are mildly dry mucous membranes.  Neck - normal range of motion, no tenderness, and supple.   Respiratory:  No respiratory distress, normal breath sounds, no rales, and no wheezing.   Cardiovascular:  Normal rate and normal rhythm. No murmurs. No gallops or rubs.   GI:  Soft, nondistended, and nontender. No appreciable organomegaly or masses. No rebound and no guarding.   :  No costovertebral angle tenderness   Musculoskeletal:  No edema, no tenderness, and no deformities. Back - no tenderness.  Integument:  No  lacerations, abrasions, or contusions appreciated. No obvious rashes   Lymphatic:  No lymphadenopathy noted   Neurologic:  Alert and appropriately responsive.  CN 2-12 grossly normal, normal motor function, normal sensory function, no focal deficits noted   Psychiatric:  Speech and behavior appropriate       LABS    Labs Reviewed   COMPREHENSIVE METABOLIC PANEL - Abnormal; Notable for the following components:       Result Value    Chloride 95 (*)     Anion Gap 22 (*)     Glucose 346 (*)     BUN/ Creatinine Ratio 30 (*)     Protein, Total 8.3 (*)     All other components within normal limits   BETA HYDROXYBUTYRATE - Abnormal; Notable for the following components:    Beta Hydroxybutyrate 8.1 (*)     All other components within normal limits   CBC WITH AUTOMATED DIFFERENTIAL (PERFORMABLE ONLY) - Abnormal; Notable for the following components:    RDW-SD 37.0 (*)     All other components within normal limits    Narrative:     This is an appended report.  These results have been appended to a previously verified report.   MAGNESIUM - Normal   CBC WITH DIFFERENTIAL    Narrative:     The following orders were created for panel order CBC with Automated Differential.                  Procedure                               Abnormality         Status                                     ---------                               -----------         ------                                     CBC with Automated Dif...[[id number]]  Abnormal            Final result                                                 Please view results for these tests on the individual orders.   GLYCOHEMOGLOBIN   RAINBOW DRAW    Narrative:     The following orders were created for panel order [place] Draw Light Blue Top Collected? 1 Label; Red Top Collected? No Labels; Light Green Top Collected? 1 Label; Lavender Top Collected? 1 Label; Gold Top Collected? 1 Label; Pink Top Collected? No Labels; Grey Top Collected? No Labels.                  Procedure                                Abnormality         Status                                     ---------                               -----------         ------                                     Light Blue Top[[id number]]                                 In process                                 Light Green Top[[id number]]                                In process                                 Lavender Top[[id number]]                                   In process                                 Gold Top[[id number]]                                       In process                                                   Please view results for these tests on the individual orders.   LIGHT BLUE TOP   LIGHT GREEN TOP   LAVENDER TOP   GOLD TOP   POCT METERED BLOOD GLUCOSE   POCT METERED BLOOD GLUCOSE   POCT METERED BLOOD GLUCOSE   POCT METERED BLOOD GLUCOSE   POCT METERED BLOOD GLUCOSE   POCT METERED BLOOD GLUCOSE   POCT METERED BLOOD GLUCOSE   POCT METERED BLOOD GLUCOSE   POCT METERED BLOOD GLUCOSE       ED COURSE & MEDICAL DECISION MAKING    Patient here with hyperglycemia. Differential diagnosis includes diabetic ketoacidosis as well as secondary causes for hyperglycemia including infection, medication change, other metabolic derangements, or other physiologic stressors.Mild to moderately dehydrated. Accordingly fluid given. Hyperglycemic.  Insulin provided.  Labs ordered to help rule out other metabolic derangements requiring additional treatment.  Initial Education provided.  Will arrange diabetic Education as an outpatient.  Response insulin fluid still pending upon sign-out.  Suspect type 1 diabetes given overall clinical scenario    Interventions: Examination, patient and pair teaching, labs, meds, fluids and observation  Status upon Re-Evaluation:  Still has no symptoms and continues to be hemodynamically stable and otherwise well-appearing.  Vital sign interpretation:   Visit Vitals  /68   Pulse 89   Temp  98.2 °F (36.8 °C) (Oral)   Ht 5' 7\" (1.702 m)   Wt 56.4 kg   SpO2 94%   BMI 19.48 kg/m²     Normal heart rate, normal respiratory rate, normal blood pressure, and good oxygen saturation on room air.  Disposition: Discharge with followup and reevaluation.     Mom called the day later and indicated difficulty getting in to see a physician.  I contacted endocrine who could not offer an appointment.  Mom ultimately did get a primary care doctor follow-up appointment.  Indicated if unable to have an encounter that results in a prescription for insulin needs to come back to the ED.    The patient was apprised of the results, current diagnosis, current prognosis, and the plan. The patient was in agreement with the plan as it occurred and understood the risks and benefits of pursuing further diagnostic testing/treatment and preferred the plan as it is. Detailed discharge instructions including followup were provided and the patient communicated an understanding and acceptance of this.    Diagnosis:   Hyperglycemia - acute and probable 1st diagnosis of diabetes             Ozzie Sanchez MD  12/08/20 0902       Ozzie Sanchez MD  12/08/20 4008

## 2020-12-14 ENCOUNTER — TELEPHONE (OUTPATIENT)
Dept: FAMILY MEDICINE | Facility: CLINIC | Age: 60
End: 2020-12-14

## 2020-12-14 NOTE — TELEPHONE ENCOUNTER
----- Message from Osmel Carter sent at 12/14/2020  1:43 PM CST -----  Regarding: PHARM  Contact: [facility]  Type:  Pharmacy Calling to Clarify an RX    Name of Caller: Catal[place]    Pharmacy Name: [facility]    Prescription Name: INSULIN    What do they need to clarify? Insulin sig needs clarification    Can you be contacted via [facility]? No    Best Call Back Number: [phone] (home)     Additional Information:   [facility] [place] - [address] [facility] [address] [id number] [facility] [address]  [place] [address]  Phone: [phone] Fax: [phone]

## 2020-12-14 NOTE — TELEPHONE ENCOUNTER
Pharmacy was advised that patient can start taking humalog since its cover by insurance company. Patient needs to take 10units of humalog 4 times a day. Up to 20 units 4 a day on days he has chemo.

## 2020-12-15 ENCOUNTER — PATIENT MESSAGE (OUTPATIENT)
Dept: RHEUMATOLOGY | Facility: CLINIC | Age: 60
End: 2020-12-15

## 2020-12-15 ENCOUNTER — TELEPHONE (OUTPATIENT)
Dept: RHEUMATOLOGY | Facility: CLINIC | Age: 60
End: 2020-12-15

## 2020-12-15 NOTE — TELEPHONE ENCOUNTER
Patient did not show for visit.  Staff received message that there was an issue with the vehicle.  Will schedule as a virtual this week.  Left message on patient's wife's cell phone after multiple calls with no answer on all numbers including son's on chart.

## 2020-12-15 NOTE — TELEPHONE ENCOUNTER
----- Message from Wendy Singh LPN sent at 12/15/2020  1:20 PM CST -----  Regarding: FW: Car trouble  Contact: pt wife    ----- Message -----  From: Chanelle ESCAMILLA August  Sent: 12/15/2020  11:18 AM CST  To: Nita RAJAN Staff  Subject: Car trouble                                      Reason:Pt is schedule @# 1:00 but is pulled over waiting for Road side assistance Wife ask if pt can be fit back in schedule?    Communication: [phone]

## 2020-12-15 NOTE — TELEPHONE ENCOUNTER
Called patient to reschedule appt. No answer. LVM requesting patient call back to reschedule. Will try again later.         Thank you,   Wendy

## 2020-12-16 ENCOUNTER — DOCUMENT SCAN (OUTPATIENT)
Dept: HOME HEALTH SERVICES | Facility: HOSPITAL | Age: 60
End: 2020-12-16
Payer: MEDICARE

## 2020-12-16 ENCOUNTER — TELEPHONE (OUTPATIENT)
Dept: RHEUMATOLOGY | Facility: CLINIC | Age: 60
End: 2020-12-16

## 2020-12-16 NOTE — TELEPHONE ENCOUNTER
Patient missed specially scheduled appointment yesterday at 1:00 p.m. due to car trouble.  Staff to see if the patient can do a virtual visit tomorrow at 8:00 a.m..

## 2020-12-16 NOTE — TELEPHONE ENCOUNTER
----- Message from Wendy Singh LPN sent at 12/15/2020  1:15 PM CST -----  Regarding: FW: Car trouble  Contact: pt wife    ----- Message -----  From: Chanelle ESCAMILLA August  Sent: 12/15/2020  11:18 AM CST  To: Nita RAJAN Staff  Subject: Car trouble                                      Reason:Pt is schedule @# 1:00 but is pulled over waiting for Road side assistance Wife ask if pt can be fit back in schedule?    Communication: [phone]

## 2020-12-17 ENCOUNTER — OFFICE VISIT (OUTPATIENT)
Dept: RHEUMATOLOGY | Facility: CLINIC | Age: 60
End: 2020-12-17
Payer: MEDICARE

## 2020-12-17 ENCOUNTER — PATIENT MESSAGE (OUTPATIENT)
Dept: RHEUMATOLOGY | Facility: CLINIC | Age: 60
End: 2020-12-17

## 2020-12-17 ENCOUNTER — DOCUMENT SCAN (OUTPATIENT)
Dept: HOME HEALTH SERVICES | Facility: HOSPITAL | Age: 60
End: 2020-12-17
Payer: MEDICARE

## 2020-12-17 VITALS — HEIGHT: 70 IN | BODY MASS INDEX: 30.92 KG/M2 | WEIGHT: 216 LBS

## 2020-12-17 DIAGNOSIS — D84.9 IMMUNOSUPPRESSION: ICD-10-CM

## 2020-12-17 DIAGNOSIS — Z87.81 HISTORY OF VERTEBRAL FRACTURE: ICD-10-CM

## 2020-12-17 DIAGNOSIS — M81.0 OSTEOPOROSIS, UNSPECIFIED OSTEOPOROSIS TYPE, UNSPECIFIED PATHOLOGICAL FRACTURE PRESENCE: ICD-10-CM

## 2020-12-17 DIAGNOSIS — I77.6 CNS VASCULITIS: Primary | ICD-10-CM

## 2020-12-17 PROCEDURE — 1125F AMNT PAIN NOTED PAIN PRSNT: CPT | Mod: 95,,, | Performed by: INTERNAL MEDICINE

## 2020-12-17 PROCEDURE — 99215 OFFICE O/P EST HI 40 MIN: CPT | Mod: 95,,, | Performed by: INTERNAL MEDICINE

## 2020-12-17 PROCEDURE — 99499 RISK ADDL DX/OHS AUDIT: ICD-10-PCS | Mod: 95,,, | Performed by: INTERNAL MEDICINE

## 2020-12-17 PROCEDURE — 99215 PR OFFICE/OUTPT VISIT, EST, LEVL V, 40-54 MIN: ICD-10-PCS | Mod: 95,,, | Performed by: INTERNAL MEDICINE

## 2020-12-17 PROCEDURE — 99499 UNLISTED E&M SERVICE: CPT | Mod: 95,,, | Performed by: INTERNAL MEDICINE

## 2020-12-17 PROCEDURE — 3008F PR BODY MASS INDEX (BMI) DOCUMENTED: ICD-10-PCS | Mod: CPTII,95,, | Performed by: INTERNAL MEDICINE

## 2020-12-17 PROCEDURE — 3008F BODY MASS INDEX DOCD: CPT | Mod: CPTII,95,, | Performed by: INTERNAL MEDICINE

## 2020-12-17 PROCEDURE — 1125F PR PAIN SEVERITY QUANTIFIED, PAIN PRESENT: ICD-10-PCS | Mod: 95,,, | Performed by: INTERNAL MEDICINE

## 2020-12-17 NOTE — PROGRESS NOTES
Rapid3 Question Responses and Scores 12/16/2020   MDHAQ Score 1.6   Psychologic Score 4.4   Pain Score 3   When you awakened in the morning OVER THE LAST WEEK, did you feel stiff? Yes   If Yes, please indicate the number of hours until you are as limber as you will be for the day -   Fatigue Score 10   Global Health Score 6   RAPID3 Score 4.77

## 2020-12-17 NOTE — PROGRESS NOTES
"Subjective:       Patient ID: Bessy Nunez is a 60 y.o. male.    Chief Complaint: CNS Vasculitis    HPI:  Bessy Nunez is a 60 y.o. 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  Treated with Cytoxan monthly since 2017.  After Cytoxan his head is cleared for 2-3 weeks and he falls left.  Ritalin given to patient by Dr. Love has helped to "clear his brain up".  Dr. Urias raised concern about toxicity of long term chemotherapy with Cytoxan.    Patient switched to Rituxan 1/2020.      Interval History:  Patient lost to follow.  Hospitalized for UTI in October 11, 2020 and seen in ED Oct 29 for slurred speech, facial droop and left sided weakness but left AMA.  Seen again Oct 30,2020 in ED for fall was admitted seen by neuro and MRI done.  Patient had resolution of symptoms and was felt to be back at baseline and was to follow as outpatient with neurology and rheumatology.    Wife reports difficulty with patient walking a month ago.  PT at home helped with this issue.  Had home PT but stopped recently and patient does not like to do the home exercises provided by PT.  Last fall 3-4 weeks ago and stumbles a great deal.  Now with 3-4/10 ache in front of head improves if he does not concentrate as much and worsens if he focuses.  History of fracture of back in the past after fall.  Takes alendronate   Patient received infusiond of Rituxan (1/10/2020, 1/24/2020, 5/14/2020).  Wife feels improvement since Rituxan he is 100% previously but he is now not talking much. He is eating.  He is compliant with medications.  Started CPAP yesterday and seems to helpful.  No UTI symptoms.        Review of Systems   Constitutional: Positive for fatigue. Negative for fever and unexpected weight change.   HENT: Negative for drooling, mouth sores and trouble swallowing.    Eyes: Negative.  Negative for redness.   Respiratory: Negative.  Negative for cough and shortness of breath.    Cardiovascular: Negative.  " Negative for chest pain.   Gastrointestinal: Negative for constipation and diarrhea.   Endocrine: Negative.    Genitourinary: Negative.  Negative for genital sores.   Musculoskeletal: Positive for arthralgias. Negative for back pain, gait problem and neck pain (intermittent).   Skin: Negative.  Negative for rash.   Neurological: Positive for headaches.   Hematological: Does not bruise/bleed easily.   Psychiatric/Behavioral: Decreased concentration:                    Objective:   There were no vitals taken for this visit.     Physical Exam   Constitutional: He is oriented to person, place, and time and well-developed, well-nourished, and in no distress.   HENT:   Head: Normocephalic and atraumatic.   Eyes: Conjunctivae and EOM are normal.   Neurological: He is alert and oriented to person, place, and time.   Psychiatric: Mood and affect normal.            LABS    Component      Latest Ref Rng & Units 10/30/2020   WBC      3.90 - 12.70 K/uL 8.60   RBC      4.60 - 6.20 M/uL 3.95 (L)   Hemoglobin      14.0 - 18.0 g/dL 11.5 (L)   Hematocrit      40.0 - 54.0 % 35.1 (L)   MCV      82 - 98 fL 89   MCH      27.0 - 31.0 pg 29.1   MCHC      32.0 - 36.0 g/dL 32.8   RDW      11.5 - 14.5 % 13.4   Platelets      150 - 350 K/uL 195   MPV      9.2 - 12.9 fL 9.1 (L)   Immature Granulocytes      0.0 - 0.5 % 0.3   Gran # (ANC)      1.8 - 7.7 K/uL 6.1   Immature Grans (Abs)      0.00 - 0.04 K/uL 0.03   Lymph #      1.0 - 4.8 K/uL 1.2   Mono #      0.3 - 1.0 K/uL 1.0   Eos #      0.0 - 0.5 K/uL 0.2   Baso #      0.00 - 0.20 K/uL 0.05   nRBC      0 /100 WBC 0   Gran %      38.0 - 73.0 % 71.3   Lymph %      18.0 - 48.0 % 13.8 (L)   Mono %      4.0 - 15.0 % 11.9   Eosinophil %      0.0 - 8.0 % 2.1   Basophil %      0.0 - 1.9 % 0.6   Differential Method       Automated   Sodium      136 - 145 mmol/L 144   Potassium      3.5 - 5.1 mmol/L 3.7   Chloride      95 - 110 mmol/L 112 (H)   CO2      23 - 29 mmol/L 20 (L)   Glucose      70 - 110  mg/dL 153 (H)   BUN      8 - 23 mg/dL 22   Creatinine      0.5 - 1.4 mg/dL 1.3   Calcium      8.7 - 10.5 mg/dL 9.2   PROTEIN TOTAL      6.0 - 8.4 g/dL 7.1   Albumin      3.5 - 5.2 g/dL 3.9   BILIRUBIN TOTAL      0.1 - 1.0 mg/dL 0.5   Alkaline Phosphatase      55 - 135 U/L 109   AST      10 - 40 U/L 28   ALT      10 - 44 U/L 26   Anion Gap      8 - 16 mmol/L 12   eGFR if African American      >60 mL/min/1.73 m:2 >60.0   eGFR if non African American      >60 mL/min/1.73 m:2 58.5 (A)   Specimen UA       Urine, Clean Catch   Color, UA      Yellow, Straw, Beti Straw   Appearance, UA      Clear Clear   pH, UA      5.0 - 8.0 5.0   Specific Gravity, UA      1.005 - 1.030 1.015   Protein, UA      Negative 1+ (A)   Glucose, UA      Negative Negative   Ketones, UA      Negative Negative   Bilirubin (UA)      Negative Negative   Occult Blood UA      Negative 1+ (A)   NITRITE UA      Negative Negative   Leukocytes, UA      Negative Negative   RBC, UA      0 - 4 /hpf 1   WBC, UA      0 - 5 /hpf 2   Bacteria, UA      None-Occ /hpf None   Hyaline Casts, UA      0-1/lpf /lpf 0   Microscopic Comment       SEE COMMENT   Hemoglobin A1C External      4.0 - 5.6 % 7.3 (H)   Estimated Avg Glucose      68 - 131 mg/dL 163 (H)   Magnesium      1.6 - 2.6 mg/dL 1.8   Phosphorus      2.7 - 4.5 mg/dL 3.1   Vitamin B-12      210 - 950 pg/mL 363   Thiamine      38 - 122 ug/L 54   Vitamin E      500 - 1800 ug/dL 1133   Vitamin B6      5 - 50 ug/L 5   RPR      Non-reactive Non-reactive   HIV 1/2 Ag/Ab      Negative Negative       Assessment:       1.  CNS vasculitis  2.  Immunosupprsession.  TPMT normal metabolizer in 8/2018 but failed azathioprine in the past per chart.    3.  Diabetes  4.  Compliance.  He is no longer hiding medications  5.  Diarrhea.  Improved with bladder medication  6.  Osteoporosis. Vertebral fracture in past. On alendronate  7.  Sleep apnea. On CPAP  Plan:       1.  Labs  2.  Restart Rituximab.  Staff to help with scheduling.    3.  Patient had flu vaccine      RTO 3 months/prn    The patient location is: home  The chief complaint leading to consultation is: CNS vasculitis    Visit type: TELE AUDIOVISUAL:22294    Face to Face time with patient: 25 minutes  30 minutes of total time spent on the encounter, which includes face to face time and non-face to face time preparing to see the patient (eg, review of tests), Obtaining and/or reviewing separately obtained history, Documenting clinical information in the electronic or other health record, Independently interpreting results (not separately reported) and communicating results to the patient/family/caregiver, or Care coordination (not separately reported).         Each patient to whom he or she provides medical services by telemedicine is:  (1) informed of the relationship between the physician and patient and the respective role of any other health care provider with respect to management of the patient; and (2) notified that he or she may decline to receive medical services by telemedicine and may withdraw from such care at any time.    Notes: see above

## 2020-12-22 ENCOUNTER — TELEPHONE (OUTPATIENT)
Dept: RHEUMATOLOGY | Facility: CLINIC | Age: 60
End: 2020-12-22

## 2020-12-22 RX ORDER — HEPARIN 100 UNIT/ML
500 SYRINGE INTRAVENOUS
Status: CANCELLED | OUTPATIENT
Start: 2020-12-22

## 2020-12-22 RX ORDER — METHYLPREDNISOLONE SOD SUCC 125 MG
125 VIAL (EA) INJECTION ONCE
Status: CANCELLED | OUTPATIENT
Start: 2020-12-22

## 2020-12-22 RX ORDER — DIPHENHYDRAMINE HCL 25 MG
25 CAPSULE ORAL
Status: CANCELLED | OUTPATIENT
Start: 2020-12-22

## 2020-12-22 RX ORDER — MEPERIDINE HYDROCHLORIDE 50 MG/ML
25 INJECTION INTRAMUSCULAR; INTRAVENOUS; SUBCUTANEOUS
Status: CANCELLED | OUTPATIENT
Start: 2020-12-22

## 2020-12-22 RX ORDER — ACETAMINOPHEN 325 MG/1
650 TABLET ORAL
Status: CANCELLED | OUTPATIENT
Start: 2020-12-22

## 2020-12-22 RX ORDER — SODIUM CHLORIDE 0.9 % (FLUSH) 0.9 %
10 SYRINGE (ML) INJECTION
Status: CANCELLED | OUTPATIENT
Start: 2020-12-22

## 2021-01-05 ENCOUNTER — DOCUMENT SCAN (OUTPATIENT)
Dept: HOME HEALTH SERVICES | Facility: HOSPITAL | Age: 61
End: 2021-01-05
Payer: MEDICARE

## 2021-01-06 ENCOUNTER — TELEPHONE (OUTPATIENT)
Dept: RHEUMATOLOGY | Facility: CLINIC | Age: 61
End: 2021-01-06

## 2021-01-07 ENCOUNTER — OFFICE VISIT (OUTPATIENT)
Dept: RHEUMATOLOGY | Facility: CLINIC | Age: 61
End: 2021-01-07
Payer: MEDICARE

## 2021-01-07 DIAGNOSIS — M80.80XD OTHER OSTEOPOROSIS WITH CURRENT PATHOLOGICAL FRACTURE WITH ROUTINE HEALING, SUBSEQUENT ENCOUNTER: ICD-10-CM

## 2021-01-07 PROCEDURE — 99215 OFFICE O/P EST HI 40 MIN: CPT | Mod: 95,,, | Performed by: INTERNAL MEDICINE

## 2021-01-07 PROCEDURE — 99215 PR OFFICE/OUTPT VISIT, EST, LEVL V, 40-54 MIN: ICD-10-PCS | Mod: 95,,, | Performed by: INTERNAL MEDICINE

## 2021-01-07 RX ORDER — ALENDRONATE SODIUM 70 MG/1
70 TABLET ORAL
Qty: 4 TABLET | Refills: 11 | Status: SHIPPED | OUTPATIENT
Start: 2021-01-07 | End: 2022-01-07

## 2021-01-08 ENCOUNTER — LAB VISIT (OUTPATIENT)
Dept: LAB | Facility: HOSPITAL | Age: 61
End: 2021-01-08
Attending: INTERNAL MEDICINE
Payer: MEDICARE

## 2021-01-08 DIAGNOSIS — D84.9 IMMUNOSUPPRESSION: ICD-10-CM

## 2021-01-08 DIAGNOSIS — I77.6 CNS VASCULITIS: ICD-10-CM

## 2021-01-08 LAB
ALBUMIN SERPL BCP-MCNC: 3.9 G/DL (ref 3.5–5.2)
ALP SERPL-CCNC: 94 U/L (ref 55–135)
ALT SERPL W/O P-5'-P-CCNC: 16 U/L (ref 10–44)
ANION GAP SERPL CALC-SCNC: 12 MMOL/L (ref 8–16)
AST SERPL-CCNC: 16 U/L (ref 10–40)
BASOPHILS # BLD AUTO: 0.03 K/UL (ref 0–0.2)
BASOPHILS NFR BLD: 0.4 % (ref 0–1.9)
BILIRUB SERPL-MCNC: 0.6 MG/DL (ref 0.1–1)
BUN SERPL-MCNC: 20 MG/DL (ref 8–23)
CALCIUM SERPL-MCNC: 9.5 MG/DL (ref 8.7–10.5)
CHLORIDE SERPL-SCNC: 104 MMOL/L (ref 95–110)
CO2 SERPL-SCNC: 25 MMOL/L (ref 23–29)
CREAT SERPL-MCNC: 1.7 MG/DL (ref 0.5–1.4)
DIFFERENTIAL METHOD: ABNORMAL
EOSINOPHIL # BLD AUTO: 0.1 K/UL (ref 0–0.5)
EOSINOPHIL NFR BLD: 1.6 % (ref 0–8)
ERYTHROCYTE [DISTWIDTH] IN BLOOD BY AUTOMATED COUNT: 14 % (ref 11.5–14.5)
ERYTHROCYTE [SEDIMENTATION RATE] IN BLOOD BY WESTERGREN METHOD: 28 MM/HR (ref 0–10)
EST. GFR  (AFRICAN AMERICAN): 49 ML/MIN/1.73 M^2
EST. GFR  (NON AFRICAN AMERICAN): 42 ML/MIN/1.73 M^2
GLUCOSE SERPL-MCNC: 242 MG/DL (ref 70–110)
HCT VFR BLD AUTO: 35.6 % (ref 40–54)
HGB BLD-MCNC: 12.2 G/DL (ref 14–18)
IMM GRANULOCYTES # BLD AUTO: 0.03 K/UL (ref 0–0.04)
IMM GRANULOCYTES NFR BLD AUTO: 0.4 % (ref 0–0.5)
LYMPHOCYTES # BLD AUTO: 1 K/UL (ref 1–4.8)
LYMPHOCYTES NFR BLD: 14.2 % (ref 18–48)
MCH RBC QN AUTO: 29.5 PG (ref 27–31)
MCHC RBC AUTO-ENTMCNC: 34.3 G/DL (ref 32–36)
MCV RBC AUTO: 86 FL (ref 82–98)
MONOCYTES # BLD AUTO: 0.5 K/UL (ref 0.3–1)
MONOCYTES NFR BLD: 7.7 % (ref 4–15)
NEUTROPHILS # BLD AUTO: 5.1 K/UL (ref 1.8–7.7)
NEUTROPHILS NFR BLD: 75.7 % (ref 38–73)
NRBC BLD-RTO: 0 /100 WBC
PLATELET # BLD AUTO: 177 K/UL (ref 150–350)
PMV BLD AUTO: 8.7 FL (ref 9.2–12.9)
POTASSIUM SERPL-SCNC: 4.5 MMOL/L (ref 3.5–5.1)
PROT SERPL-MCNC: 7.2 G/DL (ref 6–8.4)
RBC # BLD AUTO: 4.13 M/UL (ref 4.6–6.2)
SODIUM SERPL-SCNC: 141 MMOL/L (ref 136–145)
WBC # BLD AUTO: 6.77 K/UL (ref 3.9–12.7)

## 2021-01-08 PROCEDURE — 85025 COMPLETE CBC W/AUTO DIFF WBC: CPT

## 2021-01-08 PROCEDURE — 85651 RBC SED RATE NONAUTOMATED: CPT

## 2021-01-08 PROCEDURE — 86803 HEPATITIS C AB TEST: CPT

## 2021-01-08 PROCEDURE — 86356 MONONUCLEAR CELL ANTIGEN: CPT

## 2021-01-08 PROCEDURE — 80053 COMPREHEN METABOLIC PANEL: CPT

## 2021-01-08 PROCEDURE — 86140 C-REACTIVE PROTEIN: CPT

## 2021-01-08 PROCEDURE — 36415 COLL VENOUS BLD VENIPUNCTURE: CPT

## 2021-01-08 PROCEDURE — 82784 ASSAY IGA/IGD/IGG/IGM EACH: CPT | Mod: 59

## 2021-01-09 LAB
CRP SERPL-MCNC: 4.5 MG/L (ref 0–8.2)
IGA SERPL-MCNC: 208 MG/DL (ref 40–350)
IGG SERPL-MCNC: 751 MG/DL (ref 650–1600)
IGM SERPL-MCNC: 27 MG/DL (ref 50–300)

## 2021-01-11 ENCOUNTER — INFUSION (OUTPATIENT)
Dept: INFUSION THERAPY | Facility: HOSPITAL | Age: 61
End: 2021-01-11
Payer: MEDICARE

## 2021-01-11 ENCOUNTER — TELEPHONE (OUTPATIENT)
Dept: RHEUMATOLOGY | Facility: CLINIC | Age: 61
End: 2021-01-11

## 2021-01-11 ENCOUNTER — PATIENT MESSAGE (OUTPATIENT)
Dept: RHEUMATOLOGY | Facility: CLINIC | Age: 61
End: 2021-01-11

## 2021-01-11 VITALS
WEIGHT: 228.81 LBS | OXYGEN SATURATION: 100 % | SYSTOLIC BLOOD PRESSURE: 149 MMHG | HEART RATE: 80 BPM | TEMPERATURE: 98 F | DIASTOLIC BLOOD PRESSURE: 81 MMHG | RESPIRATION RATE: 18 BRPM | BODY MASS INDEX: 32.76 KG/M2 | HEIGHT: 70 IN

## 2021-01-11 DIAGNOSIS — D84.9 IMMUNOSUPPRESSION: ICD-10-CM

## 2021-01-11 DIAGNOSIS — N28.9 RENAL INSUFFICIENCY: ICD-10-CM

## 2021-01-11 DIAGNOSIS — I77.6 CNS VASCULITIS: Primary | ICD-10-CM

## 2021-01-11 LAB — HCV AB SERPL QL IA: NEGATIVE

## 2021-01-11 PROCEDURE — 96415 CHEMO IV INFUSION ADDL HR: CPT

## 2021-01-11 PROCEDURE — 25000003 PHARM REV CODE 250: Performed by: INTERNAL MEDICINE

## 2021-01-11 PROCEDURE — 96413 CHEMO IV INFUSION 1 HR: CPT

## 2021-01-11 PROCEDURE — 63600175 PHARM REV CODE 636 W HCPCS: Performed by: INTERNAL MEDICINE

## 2021-01-11 PROCEDURE — 96375 TX/PRO/DX INJ NEW DRUG ADDON: CPT

## 2021-01-11 RX ORDER — DIPHENHYDRAMINE HCL 25 MG
25 CAPSULE ORAL
Status: CANCELLED | OUTPATIENT
Start: 2021-01-12

## 2021-01-11 RX ORDER — SODIUM CHLORIDE 0.9 % (FLUSH) 0.9 %
10 SYRINGE (ML) INJECTION
Status: DISCONTINUED | OUTPATIENT
Start: 2021-01-11 | End: 2021-01-11 | Stop reason: HOSPADM

## 2021-01-11 RX ORDER — HEPARIN 100 UNIT/ML
500 SYRINGE INTRAVENOUS
Status: DISCONTINUED | OUTPATIENT
Start: 2021-01-11 | End: 2021-01-11 | Stop reason: HOSPADM

## 2021-01-11 RX ORDER — METHYLPREDNISOLONE SOD SUCC 125 MG
125 VIAL (EA) INJECTION ONCE
Status: CANCELLED | OUTPATIENT
Start: 2021-01-12

## 2021-01-11 RX ORDER — MEPERIDINE HYDROCHLORIDE 50 MG/ML
25 INJECTION INTRAMUSCULAR; INTRAVENOUS; SUBCUTANEOUS
Status: DISCONTINUED | OUTPATIENT
Start: 2021-01-11 | End: 2021-01-11 | Stop reason: HOSPADM

## 2021-01-11 RX ORDER — MEPERIDINE HYDROCHLORIDE 50 MG/ML
25 INJECTION INTRAMUSCULAR; INTRAVENOUS; SUBCUTANEOUS
Status: CANCELLED | OUTPATIENT
Start: 2021-01-12

## 2021-01-11 RX ORDER — METHYLPREDNISOLONE SOD SUCC 125 MG
125 VIAL (EA) INJECTION ONCE
Status: COMPLETED | OUTPATIENT
Start: 2021-01-11 | End: 2021-01-11

## 2021-01-11 RX ORDER — HEPARIN 100 UNIT/ML
500 SYRINGE INTRAVENOUS
Status: CANCELLED | OUTPATIENT
Start: 2021-01-12

## 2021-01-11 RX ORDER — SODIUM CHLORIDE 0.9 % (FLUSH) 0.9 %
10 SYRINGE (ML) INJECTION
Status: CANCELLED | OUTPATIENT
Start: 2021-01-12

## 2021-01-11 RX ORDER — ACETAMINOPHEN 325 MG/1
650 TABLET ORAL
Status: CANCELLED | OUTPATIENT
Start: 2021-01-12

## 2021-01-11 RX ORDER — ACETAMINOPHEN 325 MG/1
650 TABLET ORAL
Status: COMPLETED | OUTPATIENT
Start: 2021-01-11 | End: 2021-01-11

## 2021-01-11 RX ORDER — DIPHENHYDRAMINE HCL 25 MG
25 CAPSULE ORAL
Status: COMPLETED | OUTPATIENT
Start: 2021-01-11 | End: 2021-01-11

## 2021-01-11 RX ADMIN — DIPHENHYDRAMINE HYDROCHLORIDE 25 MG: 25 CAPSULE ORAL at 07:01

## 2021-01-11 RX ADMIN — HEPARIN 500 UNITS: 100 SYRINGE at 12:01

## 2021-01-11 RX ADMIN — RITUXIMAB 1000 MG: 10 INJECTION, SOLUTION INTRAVENOUS at 08:01

## 2021-01-11 RX ADMIN — SODIUM CHLORIDE: 0.9 INJECTION, SOLUTION INTRAVENOUS at 07:01

## 2021-01-11 RX ADMIN — METHYLPREDNISOLONE SODIUM SUCCINATE 125 MG: 125 INJECTION, POWDER, FOR SOLUTION INTRAMUSCULAR; INTRAVENOUS at 07:01

## 2021-01-11 RX ADMIN — ACETAMINOPHEN 650 MG: 325 TABLET ORAL at 07:01

## 2021-01-12 ENCOUNTER — TELEPHONE (OUTPATIENT)
Dept: PODIATRY | Facility: CLINIC | Age: 61
End: 2021-01-12

## 2021-01-13 ENCOUNTER — TELEPHONE (OUTPATIENT)
Dept: RHEUMATOLOGY | Facility: CLINIC | Age: 61
End: 2021-01-13

## 2021-01-14 ENCOUNTER — OFFICE VISIT (OUTPATIENT)
Dept: PODIATRY | Facility: CLINIC | Age: 61
End: 2021-01-14
Payer: MEDICARE

## 2021-01-14 ENCOUNTER — TELEPHONE (OUTPATIENT)
Dept: PODIATRY | Facility: CLINIC | Age: 61
End: 2021-01-14

## 2021-01-14 ENCOUNTER — TELEPHONE (OUTPATIENT)
Dept: RHEUMATOLOGY | Facility: CLINIC | Age: 61
End: 2021-01-14

## 2021-01-14 VITALS
BODY MASS INDEX: 32.64 KG/M2 | HEART RATE: 84 BPM | HEIGHT: 70 IN | SYSTOLIC BLOOD PRESSURE: 129 MMHG | DIASTOLIC BLOOD PRESSURE: 64 MMHG | WEIGHT: 228 LBS

## 2021-01-14 DIAGNOSIS — B35.1 ONYCHOMYCOSIS DUE TO DERMATOPHYTE: ICD-10-CM

## 2021-01-14 DIAGNOSIS — Z79.4 TYPE 2 DIABETES MELLITUS WITH DIABETIC POLYNEUROPATHY, WITH LONG-TERM CURRENT USE OF INSULIN: Primary | ICD-10-CM

## 2021-01-14 DIAGNOSIS — Z87.898 HISTORY OF ULCERATION: ICD-10-CM

## 2021-01-14 DIAGNOSIS — E11.42 TYPE 2 DIABETES MELLITUS WITH DIABETIC POLYNEUROPATHY, WITH LONG-TERM CURRENT USE OF INSULIN: Primary | ICD-10-CM

## 2021-01-14 PROCEDURE — 11721 PR DEBRIDEMENT OF NAILS, 6 OR MORE: ICD-10-PCS | Mod: Q9,S$GLB,, | Performed by: PODIATRIST

## 2021-01-14 PROCEDURE — 11721 DEBRIDE NAIL 6 OR MORE: CPT | Mod: Q9,S$GLB,, | Performed by: PODIATRIST

## 2021-01-14 PROCEDURE — 99214 PR OFFICE/OUTPT VISIT, EST, LEVL IV, 30-39 MIN: ICD-10-PCS | Mod: 25,S$GLB,, | Performed by: PODIATRIST

## 2021-01-14 PROCEDURE — 3051F PR MOST RECENT HEMOGLOBIN A1C LEVEL 7.0 - < 8.0%: ICD-10-PCS | Mod: CPTII,S$GLB,, | Performed by: PODIATRIST

## 2021-01-14 PROCEDURE — 3074F PR MOST RECENT SYSTOLIC BLOOD PRESSURE < 130 MM HG: ICD-10-PCS | Mod: CPTII,S$GLB,, | Performed by: PODIATRIST

## 2021-01-14 PROCEDURE — 3078F DIAST BP <80 MM HG: CPT | Mod: CPTII,S$GLB,, | Performed by: PODIATRIST

## 2021-01-14 PROCEDURE — 1126F PR PAIN SEVERITY QUANTIFIED, NO PAIN PRESENT: ICD-10-PCS | Mod: S$GLB,,, | Performed by: PODIATRIST

## 2021-01-14 PROCEDURE — 3074F SYST BP LT 130 MM HG: CPT | Mod: CPTII,S$GLB,, | Performed by: PODIATRIST

## 2021-01-14 PROCEDURE — 99214 OFFICE O/P EST MOD 30 MIN: CPT | Mod: 25,S$GLB,, | Performed by: PODIATRIST

## 2021-01-14 PROCEDURE — 99999 PR PBB SHADOW E&M-EST. PATIENT-LVL IV: CPT | Mod: PBBFAC,,, | Performed by: PODIATRIST

## 2021-01-14 PROCEDURE — 1126F AMNT PAIN NOTED NONE PRSNT: CPT | Mod: S$GLB,,, | Performed by: PODIATRIST

## 2021-01-14 PROCEDURE — 3078F PR MOST RECENT DIASTOLIC BLOOD PRESSURE < 80 MM HG: ICD-10-PCS | Mod: CPTII,S$GLB,, | Performed by: PODIATRIST

## 2021-01-14 PROCEDURE — 3008F PR BODY MASS INDEX (BMI) DOCUMENTED: ICD-10-PCS | Mod: CPTII,S$GLB,, | Performed by: PODIATRIST

## 2021-01-14 PROCEDURE — 3051F HG A1C>EQUAL 7.0%<8.0%: CPT | Mod: CPTII,S$GLB,, | Performed by: PODIATRIST

## 2021-01-14 PROCEDURE — 99999 PR PBB SHADOW E&M-EST. PATIENT-LVL IV: ICD-10-PCS | Mod: PBBFAC,,, | Performed by: PODIATRIST

## 2021-01-14 PROCEDURE — 3008F BODY MASS INDEX DOCD: CPT | Mod: CPTII,S$GLB,, | Performed by: PODIATRIST

## 2021-01-14 RX ORDER — CICLOPIROX 80 MG/ML
SOLUTION TOPICAL NIGHTLY
Qty: 6.6 ML | Refills: 11 | Status: SHIPPED | OUTPATIENT
Start: 2021-01-14 | End: 2022-06-19 | Stop reason: ALTCHOICE

## 2021-01-17 ENCOUNTER — PATIENT MESSAGE (OUTPATIENT)
Dept: RHEUMATOLOGY | Facility: CLINIC | Age: 61
End: 2021-01-17

## 2021-01-18 PROBLEM — R47.81 SLURRED SPEECH: Status: RESOLVED | Noted: 2020-10-30 | Resolved: 2021-01-18
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
## 2021-01-18 PROBLEM — D69.6 THROMBOCYTOPENIA, UNSPECIFIED: Status: RESOLVED | Noted: 2020-08-05 | Resolved: 2021-01-18

## 2021-01-18 PROBLEM — R29.818 TRANSIENT NEUROLOGICAL SYMPTOMS: Status: RESOLVED | Noted: 2020-10-30 | Resolved: 2021-01-18
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
## 2021-01-18 PROBLEM — R53.1 LEFT-SIDED WEAKNESS: Status: RESOLVED | Noted: 2017-12-05 | Resolved: 2021-01-18

## 2021-01-18 PROBLEM — R53.1 WEAKNESS: Status: RESOLVED | Noted: 2020-10-30 | Resolved: 2021-01-18

## 2021-01-20 DIAGNOSIS — R53.83 FATIGUE, UNSPECIFIED TYPE: ICD-10-CM

## 2021-01-21 RX ORDER — METHYLPHENIDATE HYDROCHLORIDE 10 MG/1
10 TABLET ORAL 2 TIMES DAILY WITH MEALS
Qty: 60 TABLET | Refills: 0 | Status: SHIPPED | OUTPATIENT
Start: 2021-01-21 | End: 2021-02-01 | Stop reason: SDUPTHER

## 2021-01-25 ENCOUNTER — TELEPHONE (OUTPATIENT)
Dept: INFUSION THERAPY | Facility: HOSPITAL | Age: 61
End: 2021-01-25

## 2021-02-01 ENCOUNTER — OFFICE VISIT (OUTPATIENT)
Dept: FAMILY MEDICINE | Facility: CLINIC | Age: 61
End: 2021-02-01
Payer: MEDICARE

## 2021-02-01 VITALS
DIASTOLIC BLOOD PRESSURE: 90 MMHG | HEART RATE: 84 BPM | OXYGEN SATURATION: 97 % | HEIGHT: 70 IN | TEMPERATURE: 98 F | BODY MASS INDEX: 32.71 KG/M2 | SYSTOLIC BLOOD PRESSURE: 138 MMHG

## 2021-02-01 DIAGNOSIS — E11.65 UNCONTROLLED TYPE 2 DIABETES MELLITUS WITH HYPERGLYCEMIA: ICD-10-CM

## 2021-02-01 DIAGNOSIS — E11.21 CONTROLLED TYPE 2 DIABETES MELLITUS WITH DIABETIC NEPHROPATHY, WITH LONG-TERM CURRENT USE OF INSULIN: ICD-10-CM

## 2021-02-01 DIAGNOSIS — F03.91 DEMENTIA WITH BEHAVIORAL DISTURBANCE, UNSPECIFIED DEMENTIA TYPE: ICD-10-CM

## 2021-02-01 DIAGNOSIS — E66.9 OBESITY (BMI 30-39.9): ICD-10-CM

## 2021-02-01 DIAGNOSIS — Z79.4 CONTROLLED TYPE 2 DIABETES MELLITUS WITH DIABETIC NEPHROPATHY, WITH LONG-TERM CURRENT USE OF INSULIN: ICD-10-CM

## 2021-02-01 DIAGNOSIS — D75.9 CYTOPENIA: ICD-10-CM

## 2021-02-01 DIAGNOSIS — T45.1X5A ANEMIA ASSOCIATED WITH CHEMOTHERAPY: ICD-10-CM

## 2021-02-01 DIAGNOSIS — Z86.73 HISTORY OF STROKE: ICD-10-CM

## 2021-02-01 DIAGNOSIS — D64.81 ANEMIA ASSOCIATED WITH CHEMOTHERAPY: ICD-10-CM

## 2021-02-01 DIAGNOSIS — D84.9 ACQUIRED IMMUNOCOMPROMISED STATE: ICD-10-CM

## 2021-02-01 DIAGNOSIS — N18.30 STAGE 3 CHRONIC KIDNEY DISEASE, UNSPECIFIED WHETHER STAGE 3A OR 3B CKD: ICD-10-CM

## 2021-02-01 DIAGNOSIS — E78.2 MIXED HYPERLIPIDEMIA: ICD-10-CM

## 2021-02-01 DIAGNOSIS — T45.1X5A CHEMOTHERAPY-INDUCED THROMBOCYTOPENIA: ICD-10-CM

## 2021-02-01 DIAGNOSIS — L30.9 DERMATITIS: ICD-10-CM

## 2021-02-01 DIAGNOSIS — B35.6 TINEA CRURIS: ICD-10-CM

## 2021-02-01 DIAGNOSIS — E11.42 TYPE 2 DIABETES MELLITUS WITH DIABETIC POLYNEUROPATHY, WITH LONG-TERM CURRENT USE OF INSULIN: ICD-10-CM

## 2021-02-01 DIAGNOSIS — I77.6 CNS VASCULITIS: Primary | ICD-10-CM

## 2021-02-01 DIAGNOSIS — F32.5 MAJOR DEPRESSIVE DISORDER WITH SINGLE EPISODE, IN FULL REMISSION: Chronic | ICD-10-CM

## 2021-02-01 DIAGNOSIS — I10 ESSENTIAL HYPERTENSION: ICD-10-CM

## 2021-02-01 DIAGNOSIS — Z79.4 TYPE 2 DIABETES MELLITUS WITH DIABETIC POLYNEUROPATHY, WITH LONG-TERM CURRENT USE OF INSULIN: ICD-10-CM

## 2021-02-01 DIAGNOSIS — R53.83 FATIGUE, UNSPECIFIED TYPE: ICD-10-CM

## 2021-02-01 DIAGNOSIS — D69.59 CHEMOTHERAPY-INDUCED THROMBOCYTOPENIA: ICD-10-CM

## 2021-02-01 DIAGNOSIS — R39.81 URINARY INCONTINENCE DUE TO COGNITIVE IMPAIRMENT: ICD-10-CM

## 2021-02-01 DIAGNOSIS — E66.01 MORBID (SEVERE) OBESITY DUE TO EXCESS CALORIES: ICD-10-CM

## 2021-02-01 DIAGNOSIS — R41.89 COGNITIVE IMPAIRMENT: ICD-10-CM

## 2021-02-01 PROBLEM — R32 URINARY INCONTINENCE: Status: RESOLVED | Noted: 2017-06-04 | Resolved: 2021-02-01

## 2021-02-01 PROCEDURE — 3008F BODY MASS INDEX DOCD: CPT | Mod: CPTII,S$GLB,, | Performed by: INTERNAL MEDICINE

## 2021-02-01 PROCEDURE — 99999 PR PBB SHADOW E&M-EST. PATIENT-LVL V: CPT | Mod: PBBFAC,,, | Performed by: INTERNAL MEDICINE

## 2021-02-01 PROCEDURE — 99999 PR PBB SHADOW E&M-EST. PATIENT-LVL V: ICD-10-PCS | Mod: PBBFAC,,, | Performed by: INTERNAL MEDICINE

## 2021-02-01 PROCEDURE — 3051F HG A1C>EQUAL 7.0%<8.0%: CPT | Mod: CPTII,S$GLB,, | Performed by: INTERNAL MEDICINE

## 2021-02-01 PROCEDURE — 1126F AMNT PAIN NOTED NONE PRSNT: CPT | Mod: S$GLB,,, | Performed by: INTERNAL MEDICINE

## 2021-02-01 PROCEDURE — 1126F PR PAIN SEVERITY QUANTIFIED, NO PAIN PRESENT: ICD-10-PCS | Mod: S$GLB,,, | Performed by: INTERNAL MEDICINE

## 2021-02-01 PROCEDURE — 3080F DIAST BP >= 90 MM HG: CPT | Mod: CPTII,S$GLB,, | Performed by: INTERNAL MEDICINE

## 2021-02-01 PROCEDURE — 99499 UNLISTED E&M SERVICE: CPT | Mod: S$GLB,,, | Performed by: INTERNAL MEDICINE

## 2021-02-01 PROCEDURE — 99499 RISK ADDL DX/OHS AUDIT: ICD-10-PCS | Mod: S$GLB,,, | Performed by: INTERNAL MEDICINE

## 2021-02-01 PROCEDURE — 99214 PR OFFICE/OUTPT VISIT, EST, LEVL IV, 30-39 MIN: ICD-10-PCS | Mod: S$GLB,,, | Performed by: INTERNAL MEDICINE

## 2021-02-01 PROCEDURE — 3080F PR MOST RECENT DIASTOLIC BLOOD PRESSURE >= 90 MM HG: ICD-10-PCS | Mod: CPTII,S$GLB,, | Performed by: INTERNAL MEDICINE

## 2021-02-01 PROCEDURE — 99214 OFFICE O/P EST MOD 30 MIN: CPT | Mod: S$GLB,,, | Performed by: INTERNAL MEDICINE

## 2021-02-01 PROCEDURE — 3008F PR BODY MASS INDEX (BMI) DOCUMENTED: ICD-10-PCS | Mod: CPTII,S$GLB,, | Performed by: INTERNAL MEDICINE

## 2021-02-01 PROCEDURE — 3051F PR MOST RECENT HEMOGLOBIN A1C LEVEL 7.0 - < 8.0%: ICD-10-PCS | Mod: CPTII,S$GLB,, | Performed by: INTERNAL MEDICINE

## 2021-02-01 PROCEDURE — 3075F SYST BP GE 130 - 139MM HG: CPT | Mod: CPTII,S$GLB,, | Performed by: INTERNAL MEDICINE

## 2021-02-01 PROCEDURE — 3075F PR MOST RECENT SYSTOLIC BLOOD PRESS GE 130-139MM HG: ICD-10-PCS | Mod: CPTII,S$GLB,, | Performed by: INTERNAL MEDICINE

## 2021-02-01 RX ORDER — IRBESARTAN 300 MG/1
300 TABLET ORAL NIGHTLY
Qty: 90 TABLET | Refills: 3 | Status: ON HOLD | OUTPATIENT
Start: 2021-02-01 | End: 2021-09-05 | Stop reason: SDUPTHER

## 2021-02-01 RX ORDER — NYSTATIN 100000 [USP'U]/G
POWDER TOPICAL 2 TIMES DAILY
Qty: 60 G | Refills: 11 | Status: ON HOLD | OUTPATIENT
Start: 2021-02-01 | End: 2021-09-05 | Stop reason: HOSPADM

## 2021-02-01 RX ORDER — LIRAGLUTIDE 6 MG/ML
1.8 INJECTION SUBCUTANEOUS DAILY
Qty: 9 ML | Refills: 11 | Status: ON HOLD | OUTPATIENT
Start: 2021-02-01 | End: 2021-09-05 | Stop reason: SDUPTHER

## 2021-02-01 RX ORDER — SERTRALINE HYDROCHLORIDE 100 MG/1
200 TABLET, FILM COATED ORAL DAILY
Qty: 180 TABLET | Refills: 3 | Status: ON HOLD | OUTPATIENT
Start: 2021-02-01 | End: 2021-09-05 | Stop reason: SDUPTHER

## 2021-02-01 RX ORDER — INSULIN GLARGINE 100 [IU]/ML
25 INJECTION, SOLUTION SUBCUTANEOUS 2 TIMES DAILY
Qty: 2 BOX | Refills: 11 | Status: ON HOLD | OUTPATIENT
Start: 2021-02-01 | End: 2021-03-23 | Stop reason: HOSPADM

## 2021-02-01 RX ORDER — FLASH GLUCOSE SENSOR
KIT MISCELLANEOUS
Qty: 2 KIT | Refills: 11 | Status: SHIPPED | OUTPATIENT
Start: 2021-02-01 | End: 2021-02-03

## 2021-02-01 RX ORDER — DONEPEZIL HYDROCHLORIDE 5 MG/1
5 TABLET, FILM COATED ORAL NIGHTLY
Qty: 90 TABLET | Refills: 3 | Status: ON HOLD | OUTPATIENT
Start: 2021-02-01 | End: 2021-02-10 | Stop reason: CLARIF

## 2021-02-01 RX ORDER — INSULIN LISPRO 100 [IU]/ML
INJECTION, SOLUTION INTRAVENOUS; SUBCUTANEOUS
Qty: 2 BOX | Refills: 11 | Status: CANCELLED | OUTPATIENT
Start: 2021-02-01

## 2021-02-01 RX ORDER — QUETIAPINE FUMARATE 25 MG/1
25 TABLET, FILM COATED ORAL NIGHTLY
Qty: 30 TABLET | Refills: 11 | Status: ON HOLD | OUTPATIENT
Start: 2021-02-01 | End: 2021-03-23 | Stop reason: SDUPTHER

## 2021-02-01 RX ORDER — ATENOLOL 50 MG/1
50 TABLET ORAL DAILY
Qty: 90 TABLET | Refills: 3 | Status: ON HOLD | OUTPATIENT
Start: 2021-02-01 | End: 2021-09-05 | Stop reason: HOSPADM

## 2021-02-01 RX ORDER — DILTIAZEM HYDROCHLORIDE 240 MG/1
240 CAPSULE, EXTENDED RELEASE ORAL DAILY
Qty: 90 CAPSULE | Refills: 3 | Status: ON HOLD | OUTPATIENT
Start: 2021-02-01 | End: 2021-03-25 | Stop reason: HOSPADM

## 2021-02-01 RX ORDER — ATORVASTATIN CALCIUM 40 MG/1
40 TABLET, FILM COATED ORAL DAILY
Qty: 90 TABLET | Refills: 3 | Status: ON HOLD | OUTPATIENT
Start: 2021-02-01 | End: 2021-09-05 | Stop reason: SDUPTHER

## 2021-02-01 RX ORDER — METHYLPHENIDATE HYDROCHLORIDE 10 MG/1
10 TABLET ORAL 2 TIMES DAILY WITH MEALS
Qty: 60 TABLET | Refills: 0 | Status: ON HOLD | OUTPATIENT
Start: 2021-02-01 | End: 2021-02-10

## 2021-02-01 RX ORDER — INSULIN ASPART 100 [IU]/ML
10 INJECTION, SOLUTION INTRAVENOUS; SUBCUTANEOUS
Qty: 2 BOX | Refills: 11 | Status: SHIPPED | OUTPATIENT
Start: 2021-02-01 | End: 2021-02-02 | Stop reason: SDUPTHER

## 2021-02-02 ENCOUNTER — TELEPHONE (OUTPATIENT)
Dept: OPHTHALMOLOGY | Facility: CLINIC | Age: 61
End: 2021-02-02

## 2021-02-02 RX ORDER — INSULIN ASPART 100 [IU]/ML
10 INJECTION, SOLUTION INTRAVENOUS; SUBCUTANEOUS
Qty: 2 BOX | Refills: 11 | Status: ON HOLD | OUTPATIENT
Start: 2021-02-02 | End: 2021-09-14 | Stop reason: HOSPADM

## 2021-02-03 ENCOUNTER — TELEPHONE (OUTPATIENT)
Dept: FAMILY MEDICINE | Facility: CLINIC | Age: 61
End: 2021-02-03

## 2021-02-03 DIAGNOSIS — E11.65 UNCONTROLLED TYPE 2 DIABETES MELLITUS WITH HYPERGLYCEMIA: ICD-10-CM

## 2021-02-03 RX ORDER — FLASH GLUCOSE SENSOR
KIT MISCELLANEOUS
Qty: 2 KIT | Refills: 11 | Status: SHIPPED | OUTPATIENT
Start: 2021-02-03

## 2021-02-06 ENCOUNTER — PATIENT MESSAGE (OUTPATIENT)
Dept: FAMILY MEDICINE | Facility: CLINIC | Age: 61
End: 2021-02-06

## 2021-02-07 ENCOUNTER — HOSPITAL ENCOUNTER (OUTPATIENT)
Facility: HOSPITAL | Age: 61
Discharge: NURSING, REHAB OR LONG-TERM CARE | End: 2021-02-10
Attending: EMERGENCY MEDICINE | Admitting: INTERNAL MEDICINE
Payer: MEDICARE

## 2021-02-07 DIAGNOSIS — R53.81 DEBILITY: Primary | ICD-10-CM

## 2021-02-07 DIAGNOSIS — F03.90 DEMENTIA WITHOUT BEHAVIORAL DISTURBANCE, UNSPECIFIED DEMENTIA TYPE: ICD-10-CM

## 2021-02-07 DIAGNOSIS — M54.2 NECK PAIN: ICD-10-CM

## 2021-02-07 PROBLEM — N18.31 STAGE 3A CHRONIC KIDNEY DISEASE: Chronic | Status: ACTIVE | Noted: 2020-10-16

## 2021-02-07 PROBLEM — N18.31 STAGE 3A CHRONIC KIDNEY DISEASE: Status: ACTIVE | Noted: 2020-10-16

## 2021-02-07 LAB
ALBUMIN SERPL BCP-MCNC: 3.3 G/DL (ref 3.5–5.2)
ALP SERPL-CCNC: 84 U/L (ref 55–135)
ALT SERPL W/O P-5'-P-CCNC: 14 U/L (ref 10–44)
ANION GAP SERPL CALC-SCNC: 10 MMOL/L (ref 8–16)
AST SERPL-CCNC: 23 U/L (ref 10–40)
BACTERIA #/AREA URNS AUTO: ABNORMAL /HPF
BASOPHILS # BLD AUTO: 0.03 K/UL (ref 0–0.2)
BASOPHILS NFR BLD: 0.5 % (ref 0–1.9)
BILIRUB SERPL-MCNC: 0.4 MG/DL (ref 0.1–1)
BILIRUB UR QL STRIP: NEGATIVE
BUN SERPL-MCNC: 17 MG/DL (ref 8–23)
CALCIUM SERPL-MCNC: 8.9 MG/DL (ref 8.7–10.5)
CHLORIDE SERPL-SCNC: 103 MMOL/L (ref 95–110)
CK SERPL-CCNC: 71 U/L (ref 20–200)
CLARITY UR REFRACT.AUTO: CLEAR
CO2 SERPL-SCNC: 24 MMOL/L (ref 23–29)
COLOR UR AUTO: YELLOW
CREAT SERPL-MCNC: 1.5 MG/DL (ref 0.5–1.4)
CTP QC/QA: YES
DIFFERENTIAL METHOD: ABNORMAL
EOSINOPHIL # BLD AUTO: 0.1 K/UL (ref 0–0.5)
EOSINOPHIL NFR BLD: 2.3 % (ref 0–8)
ERYTHROCYTE [DISTWIDTH] IN BLOOD BY AUTOMATED COUNT: 13.7 % (ref 11.5–14.5)
EST. GFR  (AFRICAN AMERICAN): 56.9 ML/MIN/1.73 M^2
EST. GFR  (NON AFRICAN AMERICAN): 49.2 ML/MIN/1.73 M^2
GLUCOSE SERPL-MCNC: 218 MG/DL (ref 70–110)
GLUCOSE UR QL STRIP: ABNORMAL
HCT VFR BLD AUTO: 30.7 % (ref 40–54)
HGB BLD-MCNC: 10.5 G/DL (ref 14–18)
HGB UR QL STRIP: NEGATIVE
HYALINE CASTS UR QL AUTO: 3 /LPF
IMM GRANULOCYTES # BLD AUTO: 0.01 K/UL (ref 0–0.04)
IMM GRANULOCYTES NFR BLD AUTO: 0.2 % (ref 0–0.5)
KETONES UR QL STRIP: NEGATIVE
LACTATE SERPL-SCNC: 1.6 MMOL/L (ref 0.5–2.2)
LEUKOCYTE ESTERASE UR QL STRIP: NEGATIVE
LYMPHOCYTES # BLD AUTO: 1.3 K/UL (ref 1–4.8)
LYMPHOCYTES NFR BLD: 21.8 % (ref 18–48)
MCH RBC QN AUTO: 29.5 PG (ref 27–31)
MCHC RBC AUTO-ENTMCNC: 34.2 G/DL (ref 32–36)
MCV RBC AUTO: 86 FL (ref 82–98)
MICROSCOPIC COMMENT: ABNORMAL
MONOCYTES # BLD AUTO: 0.6 K/UL (ref 0.3–1)
MONOCYTES NFR BLD: 10.1 % (ref 4–15)
NEUTROPHILS # BLD AUTO: 3.8 K/UL (ref 1.8–7.7)
NEUTROPHILS NFR BLD: 65.1 % (ref 38–73)
NITRITE UR QL STRIP: NEGATIVE
NRBC BLD-RTO: 0 /100 WBC
PH UR STRIP: 5 [PH] (ref 5–8)
PLATELET # BLD AUTO: 191 K/UL (ref 150–350)
PMV BLD AUTO: 9.4 FL (ref 9.2–12.9)
POCT GLUCOSE: 162 MG/DL (ref 70–110)
POTASSIUM SERPL-SCNC: 4.6 MMOL/L (ref 3.5–5.1)
PROT SERPL-MCNC: 6.6 G/DL (ref 6–8.4)
PROT UR QL STRIP: ABNORMAL
RBC # BLD AUTO: 3.56 M/UL (ref 4.6–6.2)
RBC #/AREA URNS AUTO: 1 /HPF (ref 0–4)
SARS-COV-2 RDRP RESP QL NAA+PROBE: NEGATIVE
SODIUM SERPL-SCNC: 137 MMOL/L (ref 136–145)
SP GR UR STRIP: 1.01 (ref 1–1.03)
SQUAMOUS #/AREA URNS AUTO: 0 /HPF
URN SPEC COLLECT METH UR: ABNORMAL
WBC # BLD AUTO: 5.77 K/UL (ref 3.9–12.7)
WBC #/AREA URNS AUTO: 2 /HPF (ref 0–5)

## 2021-02-07 PROCEDURE — 99284 PR EMERGENCY DEPT VISIT,LEVEL IV: ICD-10-PCS | Mod: ,,, | Performed by: EMERGENCY MEDICINE

## 2021-02-07 PROCEDURE — G0378 HOSPITAL OBSERVATION PER HR: HCPCS

## 2021-02-07 PROCEDURE — 99220 PR INITIAL OBSERVATION CARE,LEVL III: CPT | Mod: ,,, | Performed by: PHYSICIAN ASSISTANT

## 2021-02-07 PROCEDURE — 82550 ASSAY OF CK (CPK): CPT

## 2021-02-07 PROCEDURE — 63600175 PHARM REV CODE 636 W HCPCS: Performed by: PHYSICIAN ASSISTANT

## 2021-02-07 PROCEDURE — 99285 EMERGENCY DEPT VISIT HI MDM: CPT | Mod: 25

## 2021-02-07 PROCEDURE — 99284 EMERGENCY DEPT VISIT MOD MDM: CPT | Mod: ,,, | Performed by: EMERGENCY MEDICINE

## 2021-02-07 PROCEDURE — 80053 COMPREHEN METABOLIC PANEL: CPT

## 2021-02-07 PROCEDURE — 81001 URINALYSIS AUTO W/SCOPE: CPT

## 2021-02-07 PROCEDURE — C9399 UNCLASSIFIED DRUGS OR BIOLOG: HCPCS | Performed by: PHYSICIAN ASSISTANT

## 2021-02-07 PROCEDURE — 96372 THER/PROPH/DIAG INJ SC/IM: CPT

## 2021-02-07 PROCEDURE — 85025 COMPLETE CBC W/AUTO DIFF WBC: CPT

## 2021-02-07 PROCEDURE — U0002 COVID-19 LAB TEST NON-CDC: HCPCS | Performed by: EMERGENCY MEDICINE

## 2021-02-07 PROCEDURE — 99220 PR INITIAL OBSERVATION CARE,LEVL III: ICD-10-PCS | Mod: ,,, | Performed by: PHYSICIAN ASSISTANT

## 2021-02-07 PROCEDURE — 25000003 PHARM REV CODE 250: Performed by: PHYSICIAN ASSISTANT

## 2021-02-07 PROCEDURE — 83605 ASSAY OF LACTIC ACID: CPT

## 2021-02-07 RX ORDER — PROMETHAZINE HYDROCHLORIDE 25 MG/1
25 TABLET ORAL EVERY 6 HOURS PRN
Status: DISCONTINUED | OUTPATIENT
Start: 2021-02-07 | End: 2021-02-10 | Stop reason: HOSPADM

## 2021-02-07 RX ORDER — ATENOLOL 25 MG/1
50 TABLET ORAL DAILY
Status: DISCONTINUED | OUTPATIENT
Start: 2021-02-08 | End: 2021-02-09

## 2021-02-07 RX ORDER — DILTIAZEM HYDROCHLORIDE 240 MG/1
240 CAPSULE, COATED, EXTENDED RELEASE ORAL DAILY
Refills: 3 | Status: DISCONTINUED | OUTPATIENT
Start: 2021-02-08 | End: 2021-02-10 | Stop reason: HOSPADM

## 2021-02-07 RX ORDER — ATORVASTATIN CALCIUM 40 MG/1
40 TABLET, FILM COATED ORAL DAILY
Status: DISCONTINUED | OUTPATIENT
Start: 2021-02-08 | End: 2021-02-10 | Stop reason: HOSPADM

## 2021-02-07 RX ORDER — AMOXICILLIN 250 MG
1 CAPSULE ORAL 2 TIMES DAILY
Status: DISCONTINUED | OUTPATIENT
Start: 2021-02-07 | End: 2021-02-10 | Stop reason: HOSPADM

## 2021-02-07 RX ORDER — INSULIN ASPART 100 [IU]/ML
1-10 INJECTION, SOLUTION INTRAVENOUS; SUBCUTANEOUS
Status: DISCONTINUED | OUTPATIENT
Start: 2021-02-07 | End: 2021-02-10 | Stop reason: HOSPADM

## 2021-02-07 RX ORDER — OMEGA-3-ACID ETHYL ESTERS 1 G/1
1 CAPSULE, LIQUID FILLED ORAL DAILY
Status: DISCONTINUED | OUTPATIENT
Start: 2021-02-08 | End: 2021-02-10 | Stop reason: HOSPADM

## 2021-02-07 RX ORDER — ACETAMINOPHEN 325 MG/1
650 TABLET ORAL EVERY 4 HOURS PRN
Status: DISCONTINUED | OUTPATIENT
Start: 2021-02-07 | End: 2021-02-10 | Stop reason: HOSPADM

## 2021-02-07 RX ORDER — CHOLECALCIFEROL (VITAMIN D3) 25 MCG
5000 TABLET ORAL DAILY
Status: DISCONTINUED | OUTPATIENT
Start: 2021-02-08 | End: 2021-02-10 | Stop reason: HOSPADM

## 2021-02-07 RX ORDER — IBUPROFEN 200 MG
24 TABLET ORAL
Status: DISCONTINUED | OUTPATIENT
Start: 2021-02-07 | End: 2021-02-10 | Stop reason: HOSPADM

## 2021-02-07 RX ORDER — INSULIN ASPART 100 [IU]/ML
7 INJECTION, SOLUTION INTRAVENOUS; SUBCUTANEOUS
Status: DISCONTINUED | OUTPATIENT
Start: 2021-02-08 | End: 2021-02-10 | Stop reason: HOSPADM

## 2021-02-07 RX ORDER — SODIUM CHLORIDE 0.9 % (FLUSH) 0.9 %
10 SYRINGE (ML) INJECTION
Status: DISCONTINUED | OUTPATIENT
Start: 2021-02-07 | End: 2021-02-10 | Stop reason: HOSPADM

## 2021-02-07 RX ORDER — ENOXAPARIN SODIUM 100 MG/ML
40 INJECTION SUBCUTANEOUS EVERY 24 HOURS
Status: DISCONTINUED | OUTPATIENT
Start: 2021-02-07 | End: 2021-02-10 | Stop reason: HOSPADM

## 2021-02-07 RX ORDER — IPRATROPIUM BROMIDE AND ALBUTEROL SULFATE 2.5; .5 MG/3ML; MG/3ML
3 SOLUTION RESPIRATORY (INHALATION) EVERY 4 HOURS PRN
Status: DISCONTINUED | OUTPATIENT
Start: 2021-02-07 | End: 2021-02-10 | Stop reason: HOSPADM

## 2021-02-07 RX ORDER — LIDOCAINE 50 MG/G
1 PATCH TOPICAL DAILY
Status: DISCONTINUED | OUTPATIENT
Start: 2021-02-07 | End: 2021-02-10 | Stop reason: HOSPADM

## 2021-02-07 RX ORDER — IBUPROFEN 200 MG
16 TABLET ORAL
Status: DISCONTINUED | OUTPATIENT
Start: 2021-02-07 | End: 2021-02-10 | Stop reason: HOSPADM

## 2021-02-07 RX ORDER — IRBESARTAN 150 MG/1
300 TABLET ORAL NIGHTLY
Status: DISCONTINUED | OUTPATIENT
Start: 2021-02-07 | End: 2021-02-10 | Stop reason: HOSPADM

## 2021-02-07 RX ORDER — ONDANSETRON 8 MG/1
8 TABLET, ORALLY DISINTEGRATING ORAL EVERY 8 HOURS PRN
Status: DISCONTINUED | OUTPATIENT
Start: 2021-02-07 | End: 2021-02-10 | Stop reason: HOSPADM

## 2021-02-07 RX ORDER — GLUCAGON 1 MG
1 KIT INJECTION
Status: DISCONTINUED | OUTPATIENT
Start: 2021-02-07 | End: 2021-02-10 | Stop reason: HOSPADM

## 2021-02-07 RX ORDER — TALC
6 POWDER (GRAM) TOPICAL NIGHTLY PRN
Status: DISCONTINUED | OUTPATIENT
Start: 2021-02-07 | End: 2021-02-10 | Stop reason: HOSPADM

## 2021-02-07 RX ORDER — ASPIRIN 81 MG/1
81 TABLET ORAL DAILY
Status: DISCONTINUED | OUTPATIENT
Start: 2021-02-08 | End: 2021-02-10 | Stop reason: HOSPADM

## 2021-02-07 RX ORDER — DONEPEZIL HYDROCHLORIDE 5 MG/1
5 TABLET, FILM COATED ORAL NIGHTLY
Status: DISCONTINUED | OUTPATIENT
Start: 2021-02-07 | End: 2021-02-10 | Stop reason: HOSPADM

## 2021-02-07 RX ORDER — SERTRALINE HYDROCHLORIDE 100 MG/1
200 TABLET, FILM COATED ORAL DAILY
Status: DISCONTINUED | OUTPATIENT
Start: 2021-02-08 | End: 2021-02-10 | Stop reason: HOSPADM

## 2021-02-07 RX ORDER — OXYBUTYNIN CHLORIDE 5 MG/1
5 TABLET, EXTENDED RELEASE ORAL DAILY
Status: DISCONTINUED | OUTPATIENT
Start: 2021-02-08 | End: 2021-02-10 | Stop reason: HOSPADM

## 2021-02-07 RX ORDER — QUETIAPINE FUMARATE 25 MG/1
25 TABLET, FILM COATED ORAL NIGHTLY
Status: DISCONTINUED | OUTPATIENT
Start: 2021-02-07 | End: 2021-02-10 | Stop reason: HOSPADM

## 2021-02-07 RX ORDER — SODIUM CHLORIDE 0.9 % (FLUSH) 0.9 %
5 SYRINGE (ML) INJECTION
Status: DISCONTINUED | OUTPATIENT
Start: 2021-02-07 | End: 2021-02-10 | Stop reason: HOSPADM

## 2021-02-07 RX ADMIN — QUETIAPINE FUMARATE 25 MG: 25 TABLET ORAL at 08:02

## 2021-02-07 RX ADMIN — DONEPEZIL HYDROCHLORIDE 5 MG: 5 TABLET, FILM COATED ORAL at 08:02

## 2021-02-07 RX ADMIN — INSULIN DETEMIR 12 UNITS: 100 INJECTION, SOLUTION SUBCUTANEOUS at 08:02

## 2021-02-07 RX ADMIN — ENOXAPARIN SODIUM 40 MG: 40 INJECTION SUBCUTANEOUS at 08:02

## 2021-02-07 RX ADMIN — IRBESARTAN 300 MG: 150 TABLET ORAL at 09:02

## 2021-02-07 RX ADMIN — DOCUSATE SODIUM 50MG AND SENNOSIDES 8.6MG 1 TABLET: 8.6; 5 TABLET, FILM COATED ORAL at 08:02

## 2021-02-08 ENCOUNTER — TELEPHONE (OUTPATIENT)
Dept: PSYCHIATRY | Facility: CLINIC | Age: 61
End: 2021-02-08

## 2021-02-08 LAB
ALBUMIN SERPL BCP-MCNC: 3.4 G/DL (ref 3.5–5.2)
ALP SERPL-CCNC: 80 U/L (ref 55–135)
ALT SERPL W/O P-5'-P-CCNC: 15 U/L (ref 10–44)
ANION GAP SERPL CALC-SCNC: 10 MMOL/L (ref 8–16)
AST SERPL-CCNC: 18 U/L (ref 10–40)
BASOPHILS # BLD AUTO: 0.03 K/UL (ref 0–0.2)
BASOPHILS NFR BLD: 0.6 % (ref 0–1.9)
BILIRUB SERPL-MCNC: 0.5 MG/DL (ref 0.1–1)
BUN SERPL-MCNC: 18 MG/DL (ref 8–23)
CALCIUM SERPL-MCNC: 9.7 MG/DL (ref 8.7–10.5)
CHLORIDE SERPL-SCNC: 107 MMOL/L (ref 95–110)
CO2 SERPL-SCNC: 27 MMOL/L (ref 23–29)
CREAT SERPL-MCNC: 1.3 MG/DL (ref 0.5–1.4)
DIFFERENTIAL METHOD: ABNORMAL
EOSINOPHIL # BLD AUTO: 0.2 K/UL (ref 0–0.5)
EOSINOPHIL NFR BLD: 3.2 % (ref 0–8)
ERYTHROCYTE [DISTWIDTH] IN BLOOD BY AUTOMATED COUNT: 13.9 % (ref 11.5–14.5)
EST. GFR  (AFRICAN AMERICAN): >60 ML/MIN/1.73 M^2
EST. GFR  (NON AFRICAN AMERICAN): 58.5 ML/MIN/1.73 M^2
ESTIMATED AVG GLUCOSE: 163 MG/DL (ref 68–131)
GLUCOSE SERPL-MCNC: 116 MG/DL (ref 70–110)
HBA1C MFR BLD: 7.3 % (ref 4–5.6)
HCT VFR BLD AUTO: 31.2 % (ref 40–54)
HGB BLD-MCNC: 10.5 G/DL (ref 14–18)
IMM GRANULOCYTES # BLD AUTO: 0.03 K/UL (ref 0–0.04)
IMM GRANULOCYTES NFR BLD AUTO: 0.6 % (ref 0–0.5)
LYMPHOCYTES # BLD AUTO: 1.3 K/UL (ref 1–4.8)
LYMPHOCYTES NFR BLD: 24.9 % (ref 18–48)
MAGNESIUM SERPL-MCNC: 1.7 MG/DL (ref 1.6–2.6)
MCH RBC QN AUTO: 28.9 PG (ref 27–31)
MCHC RBC AUTO-ENTMCNC: 33.7 G/DL (ref 32–36)
MCV RBC AUTO: 86 FL (ref 82–98)
MONOCYTES # BLD AUTO: 0.7 K/UL (ref 0.3–1)
MONOCYTES NFR BLD: 13.1 % (ref 4–15)
NEUTROPHILS # BLD AUTO: 2.9 K/UL (ref 1.8–7.7)
NEUTROPHILS NFR BLD: 57.6 % (ref 38–73)
NRBC BLD-RTO: 0 /100 WBC
PHOSPHATE SERPL-MCNC: 4.1 MG/DL (ref 2.7–4.5)
PLATELET # BLD AUTO: 184 K/UL (ref 150–350)
PMV BLD AUTO: 9.5 FL (ref 9.2–12.9)
POCT GLUCOSE: 115 MG/DL (ref 70–110)
POCT GLUCOSE: 121 MG/DL (ref 70–110)
POCT GLUCOSE: 146 MG/DL (ref 70–110)
POCT GLUCOSE: 148 MG/DL (ref 70–110)
POTASSIUM SERPL-SCNC: 4.2 MMOL/L (ref 3.5–5.1)
PROT SERPL-MCNC: 6.3 G/DL (ref 6–8.4)
RBC # BLD AUTO: 3.63 M/UL (ref 4.6–6.2)
SODIUM SERPL-SCNC: 144 MMOL/L (ref 136–145)
WBC # BLD AUTO: 5.02 K/UL (ref 3.9–12.7)

## 2021-02-08 PROCEDURE — G0378 HOSPITAL OBSERVATION PER HR: HCPCS

## 2021-02-08 PROCEDURE — 25000003 PHARM REV CODE 250: Performed by: PHYSICIAN ASSISTANT

## 2021-02-08 PROCEDURE — 99226 PR SUBSEQUENT OBSERVATION CARE,LEVEL III: CPT | Mod: ,,, | Performed by: PHYSICIAN ASSISTANT

## 2021-02-08 PROCEDURE — 97165 OT EVAL LOW COMPLEX 30 MIN: CPT

## 2021-02-08 PROCEDURE — 30200315 PPD INTRADERMAL TEST REV CODE 302: Performed by: INTERNAL MEDICINE

## 2021-02-08 PROCEDURE — 80053 COMPREHEN METABOLIC PANEL: CPT

## 2021-02-08 PROCEDURE — 97116 GAIT TRAINING THERAPY: CPT

## 2021-02-08 PROCEDURE — 63600175 PHARM REV CODE 636 W HCPCS: Performed by: PHYSICIAN ASSISTANT

## 2021-02-08 PROCEDURE — 97161 PT EVAL LOW COMPLEX 20 MIN: CPT

## 2021-02-08 PROCEDURE — 86580 TB INTRADERMAL TEST: CPT | Performed by: INTERNAL MEDICINE

## 2021-02-08 PROCEDURE — 83735 ASSAY OF MAGNESIUM: CPT

## 2021-02-08 PROCEDURE — 99226 PR SUBSEQUENT OBSERVATION CARE,LEVEL III: ICD-10-PCS | Mod: ,,, | Performed by: PHYSICIAN ASSISTANT

## 2021-02-08 PROCEDURE — 36415 COLL VENOUS BLD VENIPUNCTURE: CPT

## 2021-02-08 PROCEDURE — 83036 HEMOGLOBIN GLYCOSYLATED A1C: CPT

## 2021-02-08 PROCEDURE — 84100 ASSAY OF PHOSPHORUS: CPT

## 2021-02-08 PROCEDURE — 96372 THER/PROPH/DIAG INJ SC/IM: CPT | Mod: 59

## 2021-02-08 PROCEDURE — 85025 COMPLETE CBC W/AUTO DIFF WBC: CPT

## 2021-02-08 RX ADMIN — SERTRALINE HYDROCHLORIDE 200 MG: 100 TABLET ORAL at 08:02

## 2021-02-08 RX ADMIN — INSULIN DETEMIR 12 UNITS: 100 INJECTION, SOLUTION SUBCUTANEOUS at 09:02

## 2021-02-08 RX ADMIN — ATENOLOL 50 MG: 25 TABLET ORAL at 08:02

## 2021-02-08 RX ADMIN — INSULIN ASPART 7 UNITS: 100 INJECTION, SOLUTION INTRAVENOUS; SUBCUTANEOUS at 12:02

## 2021-02-08 RX ADMIN — QUETIAPINE FUMARATE 25 MG: 25 TABLET ORAL at 09:02

## 2021-02-08 RX ADMIN — DILTIAZEM HYDROCHLORIDE 240 MG: 240 CAPSULE, COATED, EXTENDED RELEASE ORAL at 08:02

## 2021-02-08 RX ADMIN — ENOXAPARIN SODIUM 40 MG: 40 INJECTION SUBCUTANEOUS at 05:02

## 2021-02-08 RX ADMIN — Medication 5000 UNITS: at 08:02

## 2021-02-08 RX ADMIN — ATORVASTATIN CALCIUM 40 MG: 40 TABLET, FILM COATED ORAL at 09:02

## 2021-02-08 RX ADMIN — DONEPEZIL HYDROCHLORIDE 5 MG: 5 TABLET, FILM COATED ORAL at 09:02

## 2021-02-08 RX ADMIN — DOCUSATE SODIUM 50MG AND SENNOSIDES 8.6MG 1 TABLET: 8.6; 5 TABLET, FILM COATED ORAL at 09:02

## 2021-02-08 RX ADMIN — LIDOCAINE 1 PATCH: 50 PATCH TOPICAL at 08:02

## 2021-02-08 RX ADMIN — DOCUSATE SODIUM 50MG AND SENNOSIDES 8.6MG 1 TABLET: 8.6; 5 TABLET, FILM COATED ORAL at 08:02

## 2021-02-08 RX ADMIN — ASPIRIN 81 MG: 81 TABLET, COATED ORAL at 08:02

## 2021-02-08 RX ADMIN — OMEGA-3-ACID ETHYL ESTERS 1 G: 1 CAPSULE, LIQUID FILLED ORAL at 08:02

## 2021-02-08 RX ADMIN — INSULIN ASPART 7 UNITS: 100 INJECTION, SOLUTION INTRAVENOUS; SUBCUTANEOUS at 08:02

## 2021-02-08 RX ADMIN — IRBESARTAN 300 MG: 150 TABLET ORAL at 09:02

## 2021-02-08 RX ADMIN — OXYBUTYNIN CHLORIDE 5 MG: 5 TABLET, EXTENDED RELEASE ORAL at 08:02

## 2021-02-08 RX ADMIN — INSULIN ASPART 7 UNITS: 100 INJECTION, SOLUTION INTRAVENOUS; SUBCUTANEOUS at 05:02

## 2021-02-08 RX ADMIN — TUBERCULIN PURIFIED PROTEIN DERIVATIVE 5 UNITS: 5 INJECTION, SOLUTION INTRADERMAL at 01:02

## 2021-02-09 ENCOUNTER — TELEPHONE (OUTPATIENT)
Dept: RHEUMATOLOGY | Facility: CLINIC | Age: 61
End: 2021-02-09

## 2021-02-09 LAB
ALBUMIN SERPL BCP-MCNC: 3.3 G/DL (ref 3.5–5.2)
ALP SERPL-CCNC: 76 U/L (ref 55–135)
ALT SERPL W/O P-5'-P-CCNC: 13 U/L (ref 10–44)
ANION GAP SERPL CALC-SCNC: 9 MMOL/L (ref 8–16)
AST SERPL-CCNC: 16 U/L (ref 10–40)
BASOPHILS # BLD AUTO: 0.02 K/UL (ref 0–0.2)
BASOPHILS NFR BLD: 0.3 % (ref 0–1.9)
BILIRUB SERPL-MCNC: 0.6 MG/DL (ref 0.1–1)
BUN SERPL-MCNC: 18 MG/DL (ref 8–23)
CALCIUM SERPL-MCNC: 9.2 MG/DL (ref 8.7–10.5)
CHLORIDE SERPL-SCNC: 106 MMOL/L (ref 95–110)
CO2 SERPL-SCNC: 26 MMOL/L (ref 23–29)
CREAT SERPL-MCNC: 1.2 MG/DL (ref 0.5–1.4)
DIFFERENTIAL METHOD: ABNORMAL
EOSINOPHIL # BLD AUTO: 0.2 K/UL (ref 0–0.5)
EOSINOPHIL NFR BLD: 2 % (ref 0–8)
ERYTHROCYTE [DISTWIDTH] IN BLOOD BY AUTOMATED COUNT: 14 % (ref 11.5–14.5)
EST. GFR  (AFRICAN AMERICAN): >60 ML/MIN/1.73 M^2
EST. GFR  (NON AFRICAN AMERICAN): >60 ML/MIN/1.73 M^2
GLUCOSE SERPL-MCNC: 83 MG/DL (ref 70–110)
HCT VFR BLD AUTO: 32.3 % (ref 40–54)
HGB BLD-MCNC: 10.8 G/DL (ref 14–18)
IMM GRANULOCYTES # BLD AUTO: 0.03 K/UL (ref 0–0.04)
IMM GRANULOCYTES NFR BLD AUTO: 0.4 % (ref 0–0.5)
LYMPHOCYTES # BLD AUTO: 1.6 K/UL (ref 1–4.8)
LYMPHOCYTES NFR BLD: 21 % (ref 18–48)
MAGNESIUM SERPL-MCNC: 1.8 MG/DL (ref 1.6–2.6)
MCH RBC QN AUTO: 28.8 PG (ref 27–31)
MCHC RBC AUTO-ENTMCNC: 33.4 G/DL (ref 32–36)
MCV RBC AUTO: 86 FL (ref 82–98)
MONOCYTES # BLD AUTO: 0.8 K/UL (ref 0.3–1)
MONOCYTES NFR BLD: 9.9 % (ref 4–15)
NEUTROPHILS # BLD AUTO: 5 K/UL (ref 1.8–7.7)
NEUTROPHILS NFR BLD: 66.4 % (ref 38–73)
NRBC BLD-RTO: 0 /100 WBC
PLATELET # BLD AUTO: 190 K/UL (ref 150–350)
PMV BLD AUTO: 9.6 FL (ref 9.2–12.9)
POCT GLUCOSE: 111 MG/DL (ref 70–110)
POCT GLUCOSE: 132 MG/DL (ref 70–110)
POCT GLUCOSE: 165 MG/DL (ref 70–110)
POCT GLUCOSE: 99 MG/DL (ref 70–110)
POTASSIUM SERPL-SCNC: 4 MMOL/L (ref 3.5–5.1)
PROT SERPL-MCNC: 6.1 G/DL (ref 6–8.4)
RBC # BLD AUTO: 3.75 M/UL (ref 4.6–6.2)
SODIUM SERPL-SCNC: 141 MMOL/L (ref 136–145)
WBC # BLD AUTO: 7.57 K/UL (ref 3.9–12.7)

## 2021-02-09 PROCEDURE — 36415 COLL VENOUS BLD VENIPUNCTURE: CPT

## 2021-02-09 PROCEDURE — 83735 ASSAY OF MAGNESIUM: CPT

## 2021-02-09 PROCEDURE — 25000003 PHARM REV CODE 250: Performed by: PHYSICIAN ASSISTANT

## 2021-02-09 PROCEDURE — 80053 COMPREHEN METABOLIC PANEL: CPT

## 2021-02-09 PROCEDURE — 99226 PR SUBSEQUENT OBSERVATION CARE,LEVEL III: CPT | Mod: ,,, | Performed by: PHYSICIAN ASSISTANT

## 2021-02-09 PROCEDURE — 99226 PR SUBSEQUENT OBSERVATION CARE,LEVEL III: ICD-10-PCS | Mod: ,,, | Performed by: PHYSICIAN ASSISTANT

## 2021-02-09 PROCEDURE — 85025 COMPLETE CBC W/AUTO DIFF WBC: CPT

## 2021-02-09 PROCEDURE — 63600175 PHARM REV CODE 636 W HCPCS: Performed by: PHYSICIAN ASSISTANT

## 2021-02-09 PROCEDURE — C9399 UNCLASSIFIED DRUGS OR BIOLOG: HCPCS | Performed by: PHYSICIAN ASSISTANT

## 2021-02-09 PROCEDURE — G0378 HOSPITAL OBSERVATION PER HR: HCPCS

## 2021-02-09 PROCEDURE — 96372 THER/PROPH/DIAG INJ SC/IM: CPT

## 2021-02-09 RX ORDER — ATENOLOL 25 MG/1
25 TABLET ORAL DAILY
Status: DISCONTINUED | OUTPATIENT
Start: 2021-02-09 | End: 2021-02-10 | Stop reason: HOSPADM

## 2021-02-09 RX ADMIN — LIDOCAINE 1 PATCH: 50 PATCH TOPICAL at 08:02

## 2021-02-09 RX ADMIN — ENOXAPARIN SODIUM 40 MG: 40 INJECTION SUBCUTANEOUS at 04:02

## 2021-02-09 RX ADMIN — INSULIN ASPART 7 UNITS: 100 INJECTION, SOLUTION INTRAVENOUS; SUBCUTANEOUS at 09:02

## 2021-02-09 RX ADMIN — Medication 5000 UNITS: at 08:02

## 2021-02-09 RX ADMIN — OMEGA-3-ACID ETHYL ESTERS 1 G: 1 CAPSULE, LIQUID FILLED ORAL at 08:02

## 2021-02-09 RX ADMIN — INSULIN DETEMIR 12 UNITS: 100 INJECTION, SOLUTION SUBCUTANEOUS at 08:02

## 2021-02-09 RX ADMIN — DILTIAZEM HYDROCHLORIDE 240 MG: 240 CAPSULE, COATED, EXTENDED RELEASE ORAL at 08:02

## 2021-02-09 RX ADMIN — DOCUSATE SODIUM 50MG AND SENNOSIDES 8.6MG 1 TABLET: 8.6; 5 TABLET, FILM COATED ORAL at 08:02

## 2021-02-09 RX ADMIN — IRBESARTAN 300 MG: 150 TABLET ORAL at 09:02

## 2021-02-09 RX ADMIN — OXYBUTYNIN CHLORIDE 5 MG: 5 TABLET, EXTENDED RELEASE ORAL at 08:02

## 2021-02-09 RX ADMIN — INSULIN DETEMIR 12 UNITS: 100 INJECTION, SOLUTION SUBCUTANEOUS at 09:02

## 2021-02-09 RX ADMIN — INSULIN ASPART 7 UNITS: 100 INJECTION, SOLUTION INTRAVENOUS; SUBCUTANEOUS at 04:02

## 2021-02-09 RX ADMIN — DONEPEZIL HYDROCHLORIDE 5 MG: 5 TABLET, FILM COATED ORAL at 09:02

## 2021-02-09 RX ADMIN — ATORVASTATIN CALCIUM 40 MG: 40 TABLET, FILM COATED ORAL at 08:02

## 2021-02-09 RX ADMIN — ATENOLOL 25 MG: 25 TABLET ORAL at 08:02

## 2021-02-09 RX ADMIN — ASPIRIN 81 MG: 81 TABLET, COATED ORAL at 08:02

## 2021-02-09 RX ADMIN — QUETIAPINE FUMARATE 25 MG: 25 TABLET ORAL at 09:02

## 2021-02-09 RX ADMIN — DOCUSATE SODIUM 50MG AND SENNOSIDES 8.6MG 1 TABLET: 8.6; 5 TABLET, FILM COATED ORAL at 09:02

## 2021-02-09 RX ADMIN — SERTRALINE HYDROCHLORIDE 200 MG: 100 TABLET ORAL at 08:02

## 2021-02-09 RX ADMIN — INSULIN ASPART 7 UNITS: 100 INJECTION, SOLUTION INTRAVENOUS; SUBCUTANEOUS at 01:02

## 2021-02-10 ENCOUNTER — TELEPHONE (OUTPATIENT)
Dept: PSYCHIATRY | Facility: CLINIC | Age: 61
End: 2021-02-10

## 2021-02-10 VITALS
HEART RATE: 54 BPM | BODY MASS INDEX: 30 KG/M2 | HEIGHT: 71 IN | SYSTOLIC BLOOD PRESSURE: 136 MMHG | OXYGEN SATURATION: 96 % | DIASTOLIC BLOOD PRESSURE: 64 MMHG | TEMPERATURE: 98 F | WEIGHT: 214.31 LBS | RESPIRATION RATE: 18 BRPM

## 2021-02-10 LAB
ALBUMIN SERPL BCP-MCNC: 3.1 G/DL (ref 3.5–5.2)
ALP SERPL-CCNC: 81 U/L (ref 55–135)
ALT SERPL W/O P-5'-P-CCNC: 10 U/L (ref 10–44)
ANION GAP SERPL CALC-SCNC: 10 MMOL/L (ref 8–16)
AST SERPL-CCNC: 14 U/L (ref 10–40)
BASOPHILS # BLD AUTO: 0.03 K/UL (ref 0–0.2)
BASOPHILS NFR BLD: 0.4 % (ref 0–1.9)
BILIRUB SERPL-MCNC: 0.6 MG/DL (ref 0.1–1)
BUN SERPL-MCNC: 23 MG/DL (ref 8–23)
CALCIUM SERPL-MCNC: 8.8 MG/DL (ref 8.7–10.5)
CHLORIDE SERPL-SCNC: 106 MMOL/L (ref 95–110)
CO2 SERPL-SCNC: 24 MMOL/L (ref 23–29)
CREAT SERPL-MCNC: 1.3 MG/DL (ref 0.5–1.4)
DIFFERENTIAL METHOD: ABNORMAL
EOSINOPHIL # BLD AUTO: 0.1 K/UL (ref 0–0.5)
EOSINOPHIL NFR BLD: 1.8 % (ref 0–8)
ERYTHROCYTE [DISTWIDTH] IN BLOOD BY AUTOMATED COUNT: 13.6 % (ref 11.5–14.5)
EST. GFR  (AFRICAN AMERICAN): >60 ML/MIN/1.73 M^2
EST. GFR  (NON AFRICAN AMERICAN): 58.1 ML/MIN/1.73 M^2
GLUCOSE SERPL-MCNC: 80 MG/DL (ref 70–110)
HCT VFR BLD AUTO: 31.1 % (ref 40–54)
HGB BLD-MCNC: 10.6 G/DL (ref 14–18)
IMM GRANULOCYTES # BLD AUTO: 0.02 K/UL (ref 0–0.04)
IMM GRANULOCYTES NFR BLD AUTO: 0.3 % (ref 0–0.5)
LYMPHOCYTES # BLD AUTO: 1.6 K/UL (ref 1–4.8)
LYMPHOCYTES NFR BLD: 21.5 % (ref 18–48)
MAGNESIUM SERPL-MCNC: 1.8 MG/DL (ref 1.6–2.6)
MCH RBC QN AUTO: 29.2 PG (ref 27–31)
MCHC RBC AUTO-ENTMCNC: 34.1 G/DL (ref 32–36)
MCV RBC AUTO: 86 FL (ref 82–98)
MONOCYTES # BLD AUTO: 0.7 K/UL (ref 0.3–1)
MONOCYTES NFR BLD: 9.8 % (ref 4–15)
NEUTROPHILS # BLD AUTO: 4.8 K/UL (ref 1.8–7.7)
NEUTROPHILS NFR BLD: 66.2 % (ref 38–73)
NRBC BLD-RTO: 0 /100 WBC
PLATELET # BLD AUTO: 181 K/UL (ref 150–350)
PMV BLD AUTO: 9.3 FL (ref 9.2–12.9)
POCT GLUCOSE: 105 MG/DL (ref 70–110)
POCT GLUCOSE: 128 MG/DL (ref 70–110)
POCT GLUCOSE: 152 MG/DL (ref 70–110)
POTASSIUM SERPL-SCNC: 3.8 MMOL/L (ref 3.5–5.1)
PROT SERPL-MCNC: 6.1 G/DL (ref 6–8.4)
RBC # BLD AUTO: 3.63 M/UL (ref 4.6–6.2)
SODIUM SERPL-SCNC: 140 MMOL/L (ref 136–145)
TB INDURATION 48 - 72 HR READ: 0 MM
WBC # BLD AUTO: 7.22 K/UL (ref 3.9–12.7)

## 2021-02-10 PROCEDURE — G0378 HOSPITAL OBSERVATION PER HR: HCPCS

## 2021-02-10 PROCEDURE — 83735 ASSAY OF MAGNESIUM: CPT

## 2021-02-10 PROCEDURE — 99217 PR OBSERVATION CARE DISCHARGE: CPT | Mod: ,,, | Performed by: PHYSICIAN ASSISTANT

## 2021-02-10 PROCEDURE — 85025 COMPLETE CBC W/AUTO DIFF WBC: CPT

## 2021-02-10 PROCEDURE — 80053 COMPREHEN METABOLIC PANEL: CPT

## 2021-02-10 PROCEDURE — 63600175 PHARM REV CODE 636 W HCPCS: Performed by: PHYSICIAN ASSISTANT

## 2021-02-10 PROCEDURE — 99217 PR OBSERVATION CARE DISCHARGE: ICD-10-PCS | Mod: ,,, | Performed by: PHYSICIAN ASSISTANT

## 2021-02-10 PROCEDURE — 25000003 PHARM REV CODE 250: Performed by: PHYSICIAN ASSISTANT

## 2021-02-10 PROCEDURE — 36415 COLL VENOUS BLD VENIPUNCTURE: CPT

## 2021-02-10 PROCEDURE — 96372 THER/PROPH/DIAG INJ SC/IM: CPT

## 2021-02-10 RX ORDER — ACETAMINOPHEN 325 MG/1
325 TABLET ORAL EVERY 6 HOURS PRN
Status: ON HOLD | COMMUNITY
End: 2021-02-10 | Stop reason: HOSPADM

## 2021-02-10 RX ORDER — LOPERAMIDE HCL 2 MG
2 TABLET ORAL 4 TIMES DAILY PRN
Status: ON HOLD | COMMUNITY
End: 2021-02-10 | Stop reason: HOSPADM

## 2021-02-10 RX ADMIN — LIDOCAINE 1 PATCH: 50 PATCH TOPICAL at 08:02

## 2021-02-10 RX ADMIN — DOCUSATE SODIUM 50MG AND SENNOSIDES 8.6MG 1 TABLET: 8.6; 5 TABLET, FILM COATED ORAL at 08:02

## 2021-02-10 RX ADMIN — ASPIRIN 81 MG: 81 TABLET, COATED ORAL at 08:02

## 2021-02-10 RX ADMIN — DILTIAZEM HYDROCHLORIDE 240 MG: 240 CAPSULE, COATED, EXTENDED RELEASE ORAL at 08:02

## 2021-02-10 RX ADMIN — ATENOLOL 25 MG: 25 TABLET ORAL at 08:02

## 2021-02-10 RX ADMIN — OXYBUTYNIN CHLORIDE 5 MG: 5 TABLET, EXTENDED RELEASE ORAL at 08:02

## 2021-02-10 RX ADMIN — OMEGA-3-ACID ETHYL ESTERS 1 G: 1 CAPSULE, LIQUID FILLED ORAL at 08:02

## 2021-02-10 RX ADMIN — INSULIN ASPART 2 UNITS: 100 INJECTION, SOLUTION INTRAVENOUS; SUBCUTANEOUS at 05:02

## 2021-02-10 RX ADMIN — ATORVASTATIN CALCIUM 40 MG: 40 TABLET, FILM COATED ORAL at 08:02

## 2021-02-10 RX ADMIN — INSULIN DETEMIR 12 UNITS: 100 INJECTION, SOLUTION SUBCUTANEOUS at 08:02

## 2021-02-10 RX ADMIN — INSULIN ASPART 7 UNITS: 100 INJECTION, SOLUTION INTRAVENOUS; SUBCUTANEOUS at 08:02

## 2021-02-10 RX ADMIN — Medication 5000 UNITS: at 08:02

## 2021-02-10 RX ADMIN — INSULIN ASPART 7 UNITS: 100 INJECTION, SOLUTION INTRAVENOUS; SUBCUTANEOUS at 12:02

## 2021-02-10 RX ADMIN — SERTRALINE HYDROCHLORIDE 200 MG: 100 TABLET ORAL at 08:02

## 2021-02-10 RX ADMIN — INSULIN ASPART 7 UNITS: 100 INJECTION, SOLUTION INTRAVENOUS; SUBCUTANEOUS at 05:02

## 2021-02-19 ENCOUNTER — TELEPHONE (OUTPATIENT)
Dept: OPHTHALMOLOGY | Facility: CLINIC | Age: 61
End: 2021-02-19

## 2021-02-23 ENCOUNTER — PATIENT MESSAGE (OUTPATIENT)
Dept: RHEUMATOLOGY | Facility: CLINIC | Age: 61
End: 2021-02-23

## 2021-03-02 ENCOUNTER — PATIENT MESSAGE (OUTPATIENT)
Dept: FAMILY MEDICINE | Facility: CLINIC | Age: 61
End: 2021-03-02

## 2021-03-02 ENCOUNTER — LAB VISIT (OUTPATIENT)
Dept: LAB | Facility: OTHER | Age: 61
End: 2021-03-02
Payer: MEDICARE

## 2021-03-02 DIAGNOSIS — Z20.822 ENCOUNTER FOR LABORATORY TESTING FOR COVID-19 VIRUS: ICD-10-CM

## 2021-03-02 PROCEDURE — U0003 INFECTIOUS AGENT DETECTION BY NUCLEIC ACID (DNA OR RNA); SEVERE ACUTE RESPIRATORY SYNDROME CORONAVIRUS 2 (SARS-COV-2) (CORONAVIRUS DISEASE [COVID-19]), AMPLIFIED PROBE TECHNIQUE, MAKING USE OF HIGH THROUGHPUT TECHNOLOGIES AS DESCRIBED BY CMS-2020-01-R: HCPCS

## 2021-03-03 LAB — SARS-COV-2 RNA RESP QL NAA+PROBE: NOT DETECTED

## 2021-03-05 NOTE — ASSESSMENT & PLAN NOTE
Stroke risk factor  A1C 11.4 on 5/13/17  Continue aspart and detemir   on medication compliance prior to discharge   Mercedes Flap Text: The defect edges were debeveled with a #15 scalpel blade. Given the location of the defect, shape of the defect and the proximity to free margins a Mercedes flap was deemed most appropriate. Using a sterile surgical marker, an appropriate advancement flap was drawn incorporating the defect and placing the expected incisions within the relaxed skin tension lines where possible. The area thus outlined was incised deep to adipose tissue with a #15 scalpel blade. The skin margins were undermined to an appropriate distance in all directions utilizing iris scissors.

## 2021-03-09 ENCOUNTER — LAB VISIT (OUTPATIENT)
Dept: LAB | Facility: OTHER | Age: 61
End: 2021-03-09
Payer: MEDICARE

## 2021-03-09 DIAGNOSIS — Z20.822 ENCOUNTER FOR LABORATORY TESTING FOR COVID-19 VIRUS: ICD-10-CM

## 2021-03-09 PROCEDURE — U0003 INFECTIOUS AGENT DETECTION BY NUCLEIC ACID (DNA OR RNA); SEVERE ACUTE RESPIRATORY SYNDROME CORONAVIRUS 2 (SARS-COV-2) (CORONAVIRUS DISEASE [COVID-19]), AMPLIFIED PROBE TECHNIQUE, MAKING USE OF HIGH THROUGHPUT TECHNOLOGIES AS DESCRIBED BY CMS-2020-01-R: HCPCS | Performed by: NURSE PRACTITIONER

## 2021-03-10 ENCOUNTER — PATIENT MESSAGE (OUTPATIENT)
Dept: FAMILY MEDICINE | Facility: CLINIC | Age: 61
End: 2021-03-10

## 2021-03-10 LAB — SARS-COV-2 RNA RESP QL NAA+PROBE: NOT DETECTED

## 2021-03-12 ENCOUNTER — PATIENT MESSAGE (OUTPATIENT)
Dept: FAMILY MEDICINE | Facility: CLINIC | Age: 61
End: 2021-03-12

## 2021-03-12 ENCOUNTER — SSC ENCOUNTER (OUTPATIENT)
Dept: ADMINISTRATIVE | Facility: OTHER | Age: 61
End: 2021-03-12

## 2021-03-16 ENCOUNTER — PATIENT MESSAGE (OUTPATIENT)
Dept: FAMILY MEDICINE | Facility: CLINIC | Age: 61
End: 2021-03-16

## 2021-03-16 ENCOUNTER — LAB VISIT (OUTPATIENT)
Dept: LAB | Facility: OTHER | Age: 61
End: 2021-03-16
Payer: MEDICARE

## 2021-03-16 DIAGNOSIS — Z20.822 ENCOUNTER FOR LABORATORY TESTING FOR COVID-19 VIRUS: ICD-10-CM

## 2021-03-16 PROCEDURE — U0003 INFECTIOUS AGENT DETECTION BY NUCLEIC ACID (DNA OR RNA); SEVERE ACUTE RESPIRATORY SYNDROME CORONAVIRUS 2 (SARS-COV-2) (CORONAVIRUS DISEASE [COVID-19]), AMPLIFIED PROBE TECHNIQUE, MAKING USE OF HIGH THROUGHPUT TECHNOLOGIES AS DESCRIBED BY CMS-2020-01-R: HCPCS | Performed by: NURSE PRACTITIONER

## 2021-03-17 LAB — SARS-COV-2 RNA RESP QL NAA+PROBE: NOT DETECTED

## 2021-03-20 ENCOUNTER — HOSPITAL ENCOUNTER (INPATIENT)
Facility: HOSPITAL | Age: 61
LOS: 2 days | Discharge: HOME | DRG: 151 | End: 2021-03-25
Attending: EMERGENCY MEDICINE | Admitting: FAMILY MEDICINE
Payer: MEDICARE

## 2021-03-20 DIAGNOSIS — E11.42 TYPE 2 DIABETES MELLITUS WITH DIABETIC POLYNEUROPATHY, WITH LONG-TERM CURRENT USE OF INSULIN: ICD-10-CM

## 2021-03-20 DIAGNOSIS — R04.0 EPISTAXIS: ICD-10-CM

## 2021-03-20 DIAGNOSIS — S09.90XA INJURY OF HEAD, INITIAL ENCOUNTER: ICD-10-CM

## 2021-03-20 DIAGNOSIS — D64.9 SYMPTOMATIC ANEMIA: ICD-10-CM

## 2021-03-20 DIAGNOSIS — K92.0 HEMATEMESIS, PRESENCE OF NAUSEA NOT SPECIFIED: ICD-10-CM
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
## 2021-03-20 DIAGNOSIS — K92.1 BLOODY STOOL: ICD-10-CM

## 2021-03-20 DIAGNOSIS — Z79.4 TYPE 2 DIABETES MELLITUS WITH DIABETIC POLYNEUROPATHY, WITH LONG-TERM CURRENT USE OF INSULIN: ICD-10-CM

## 2021-03-20 DIAGNOSIS — K92.2 GI BLEED: ICD-10-CM

## 2021-03-20 DIAGNOSIS — F03.91 DEMENTIA WITH BEHAVIORAL DISTURBANCE, UNSPECIFIED DEMENTIA TYPE: ICD-10-CM

## 2021-03-20 DIAGNOSIS — D62 ACUTE BLOOD LOSS ANEMIA: ICD-10-CM

## 2021-03-20 DIAGNOSIS — K92.2 UPPER GI BLEED: Primary | ICD-10-CM

## 2021-03-20 LAB
ABO + RH BLD: NORMAL
ALBUMIN SERPL BCP-MCNC: 3.4 G/DL (ref 3.5–5.2)
ALP SERPL-CCNC: 57 U/L (ref 55–135)
ALT SERPL W/O P-5'-P-CCNC: 11 U/L (ref 10–44)
ANION GAP SERPL CALC-SCNC: 9 MMOL/L (ref 8–16)
APTT PPP: 31.2 SEC (ref 23.6–33.3)
AST SERPL-CCNC: 13 U/L (ref 10–40)
BASOPHILS # BLD AUTO: 0.03 K/UL (ref 0–0.2)
BASOPHILS # BLD AUTO: 0.04 K/UL (ref 0–0.2)
BASOPHILS NFR BLD: 0.3 % (ref 0–1.9)
BASOPHILS NFR BLD: 0.4 % (ref 0–1.9)
BILIRUB SERPL-MCNC: 0.7 MG/DL (ref 0.1–1)
BLD GP AB SCN CELLS X3 SERPL QL: NORMAL
BNP SERPL-MCNC: 19 PG/ML (ref 0–99)
BUN SERPL-MCNC: 41 MG/DL (ref 8–23)
CALCIUM SERPL-MCNC: 8.9 MG/DL (ref 8.7–10.5)
CHLORIDE SERPL-SCNC: 111 MMOL/L (ref 95–110)
CO2 SERPL-SCNC: 24 MMOL/L (ref 23–29)
CREAT SERPL-MCNC: 1.5 MG/DL (ref 0.5–1.4)
DIFFERENTIAL METHOD: ABNORMAL
DIFFERENTIAL METHOD: ABNORMAL
EOSINOPHIL # BLD AUTO: 0.1 K/UL (ref 0–0.5)
EOSINOPHIL # BLD AUTO: 0.1 K/UL (ref 0–0.5)
EOSINOPHIL NFR BLD: 0.5 % (ref 0–8)
EOSINOPHIL NFR BLD: 1.2 % (ref 0–8)
ERYTHROCYTE [DISTWIDTH] IN BLOOD BY AUTOMATED COUNT: 13.6 % (ref 11.5–14.5)
ERYTHROCYTE [DISTWIDTH] IN BLOOD BY AUTOMATED COUNT: 13.7 % (ref 11.5–14.5)
EST. GFR  (AFRICAN AMERICAN): 56.5 ML/MIN/1.73 M^2
EST. GFR  (NON AFRICAN AMERICAN): 48.8 ML/MIN/1.73 M^2
FERRITIN SERPL-MCNC: 59 NG/ML (ref 20–300)
GLUCOSE SERPL-MCNC: 170 MG/DL (ref 70–110)
GLUCOSE SERPL-MCNC: 175 MG/DL (ref 70–110)
HCT VFR BLD AUTO: 26.6 % (ref 40–54)
HCT VFR BLD AUTO: 28.5 % (ref 40–54)
HCT VFR BLD AUTO: 29.4 % (ref 40–54)
HGB BLD-MCNC: 9 G/DL (ref 14–18)
HGB BLD-MCNC: 9.6 G/DL (ref 14–18)
HGB BLD-MCNC: 9.7 G/DL (ref 14–18)
IMM GRANULOCYTES # BLD AUTO: 0.05 K/UL (ref 0–0.04)
IMM GRANULOCYTES # BLD AUTO: 0.06 K/UL (ref 0–0.04)
IMM GRANULOCYTES NFR BLD AUTO: 0.5 % (ref 0–0.5)
IMM GRANULOCYTES NFR BLD AUTO: 0.6 % (ref 0–0.5)
INR PPP: 1.3
IRON SERPL-MCNC: 67 UG/DL (ref 45–160)
LIPASE SERPL-CCNC: 51 U/L (ref 4–60)
LYMPHOCYTES # BLD AUTO: 1.3 K/UL (ref 1–4.8)
LYMPHOCYTES # BLD AUTO: 1.4 K/UL (ref 1–4.8)
LYMPHOCYTES NFR BLD: 12.8 % (ref 18–48)
LYMPHOCYTES NFR BLD: 13.4 % (ref 18–48)
MAGNESIUM SERPL-MCNC: 1.7 MG/DL (ref 1.6–2.6)
MCH RBC QN AUTO: 28.5 PG (ref 27–31)
MCH RBC QN AUTO: 29.2 PG (ref 27–31)
MCHC RBC AUTO-ENTMCNC: 33 G/DL (ref 32–36)
MCHC RBC AUTO-ENTMCNC: 33.7 G/DL (ref 32–36)
MCV RBC AUTO: 87 FL (ref 82–98)
MCV RBC AUTO: 87 FL (ref 82–98)
MONOCYTES # BLD AUTO: 0.9 K/UL (ref 0.3–1)
MONOCYTES # BLD AUTO: 0.9 K/UL (ref 0.3–1)
MONOCYTES NFR BLD: 8.5 % (ref 4–15)
MONOCYTES NFR BLD: 9.1 % (ref 4–15)
NEUTROPHILS # BLD AUTO: 7.6 K/UL (ref 1.8–7.7)
NEUTROPHILS # BLD AUTO: 7.7 K/UL (ref 1.8–7.7)
NEUTROPHILS NFR BLD: 76.1 % (ref 38–73)
NEUTROPHILS NFR BLD: 76.6 % (ref 38–73)
NRBC BLD-RTO: 0 /100 WBC
NRBC BLD-RTO: 0 /100 WBC
OB PNL STL: POSITIVE
PLATELET # BLD AUTO: 170 K/UL (ref 150–350)
PLATELET # BLD AUTO: 200 K/UL (ref 150–350)
PMV BLD AUTO: 8.6 FL (ref 9.2–12.9)
PMV BLD AUTO: 9.4 FL (ref 9.2–12.9)
POTASSIUM SERPL-SCNC: 3.5 MMOL/L (ref 3.5–5.1)
PROT SERPL-MCNC: 6.3 G/DL (ref 6–8.4)
PROTHROMBIN TIME: 15.2 SEC (ref 10.6–14.8)
RBC # BLD AUTO: 3.29 M/UL (ref 4.6–6.2)
RBC # BLD AUTO: 3.4 M/UL (ref 4.6–6.2)
RETICS/RBC NFR AUTO: 1.4 % (ref 0.4–2)
SARS-COV-2 RDRP RESP QL NAA+PROBE: NEGATIVE
SATURATED IRON: 23 % (ref 20–50)
SODIUM SERPL-SCNC: 144 MMOL/L (ref 136–145)
TOTAL IRON BINDING CAPACITY: 297 UG/DL (ref 250–450)
TRANSFERRIN SERPL-MCNC: 212 MG/DL (ref 200–375)
TROPONIN I SERPL DL<=0.01 NG/ML-MCNC: <0.03 NG/ML
WBC # BLD AUTO: 10.09 K/UL (ref 3.9–12.7)
WBC # BLD AUTO: 9.93 K/UL (ref 3.9–12.7)

## 2021-03-20 PROCEDURE — 25000003 PHARM REV CODE 250: Performed by: NURSE PRACTITIONER

## 2021-03-20 PROCEDURE — 86900 BLOOD TYPING SEROLOGIC ABO: CPT | Performed by: NURSE PRACTITIONER

## 2021-03-20 PROCEDURE — 85730 THROMBOPLASTIN TIME PARTIAL: CPT | Performed by: NURSE PRACTITIONER

## 2021-03-20 PROCEDURE — 80053 COMPREHEN METABOLIC PANEL: CPT | Performed by: NURSE PRACTITIONER

## 2021-03-20 PROCEDURE — 85045 AUTOMATED RETICULOCYTE COUNT: CPT | Performed by: NURSE PRACTITIONER

## 2021-03-20 PROCEDURE — 85014 HEMATOCRIT: CPT | Performed by: NURSE PRACTITIONER

## 2021-03-20 PROCEDURE — 83540 ASSAY OF IRON: CPT | Performed by: INTERNAL MEDICINE

## 2021-03-20 PROCEDURE — U0002 COVID-19 LAB TEST NON-CDC: HCPCS | Performed by: NURSE PRACTITIONER

## 2021-03-20 PROCEDURE — 83735 ASSAY OF MAGNESIUM: CPT | Performed by: NURSE PRACTITIONER

## 2021-03-20 PROCEDURE — 99285 EMERGENCY DEPT VISIT HI MDM: CPT | Mod: 25

## 2021-03-20 PROCEDURE — 85610 PROTHROMBIN TIME: CPT | Performed by: NURSE PRACTITIONER

## 2021-03-20 PROCEDURE — 82728 ASSAY OF FERRITIN: CPT | Performed by: INTERNAL MEDICINE

## 2021-03-20 PROCEDURE — C9113 INJ PANTOPRAZOLE SODIUM, VIA: HCPCS | Performed by: NURSE PRACTITIONER

## 2021-03-20 PROCEDURE — 63600175 PHARM REV CODE 636 W HCPCS: Performed by: NURSE PRACTITIONER

## 2021-03-20 PROCEDURE — 36415 COLL VENOUS BLD VENIPUNCTURE: CPT | Performed by: INTERNAL MEDICINE

## 2021-03-20 PROCEDURE — 93005 ELECTROCARDIOGRAM TRACING: CPT | Performed by: SPECIALIST

## 2021-03-20 PROCEDURE — G0378 HOSPITAL OBSERVATION PER HR: HCPCS

## 2021-03-20 PROCEDURE — 85025 COMPLETE CBC W/AUTO DIFF WBC: CPT | Performed by: NURSE PRACTITIONER

## 2021-03-20 PROCEDURE — 36415 COLL VENOUS BLD VENIPUNCTURE: CPT | Performed by: NURSE PRACTITIONER

## 2021-03-20 PROCEDURE — 85018 HEMOGLOBIN: CPT | Performed by: NURSE PRACTITIONER

## 2021-03-20 PROCEDURE — 83880 ASSAY OF NATRIURETIC PEPTIDE: CPT | Performed by: NURSE PRACTITIONER

## 2021-03-20 PROCEDURE — 96374 THER/PROPH/DIAG INJ IV PUSH: CPT

## 2021-03-20 PROCEDURE — 96375 TX/PRO/DX INJ NEW DRUG ADDON: CPT

## 2021-03-20 PROCEDURE — 82962 GLUCOSE BLOOD TEST: CPT

## 2021-03-20 PROCEDURE — 93010 EKG 12-LEAD: ICD-10-PCS | Mod: ,,, | Performed by: SPECIALIST

## 2021-03-20 PROCEDURE — 84484 ASSAY OF TROPONIN QUANT: CPT | Performed by: NURSE PRACTITIONER

## 2021-03-20 PROCEDURE — 83690 ASSAY OF LIPASE: CPT | Performed by: NURSE PRACTITIONER

## 2021-03-20 PROCEDURE — 96376 TX/PRO/DX INJ SAME DRUG ADON: CPT

## 2021-03-20 PROCEDURE — 82272 OCCULT BLD FECES 1-3 TESTS: CPT | Performed by: NURSE PRACTITIONER

## 2021-03-20 PROCEDURE — 93010 ELECTROCARDIOGRAM REPORT: CPT | Mod: ,,, | Performed by: SPECIALIST

## 2021-03-20 RX ORDER — DONEPEZIL HYDROCHLORIDE 5 MG/1
5 TABLET, FILM COATED ORAL NIGHTLY
Status: DISCONTINUED | OUTPATIENT
Start: 2021-03-20 | End: 2021-03-25 | Stop reason: HOSPADM

## 2021-03-20 RX ORDER — ONDANSETRON 4 MG/1
8 TABLET, ORALLY DISINTEGRATING ORAL EVERY 8 HOURS PRN
Status: DISCONTINUED | OUTPATIENT
Start: 2021-03-20 | End: 2021-03-25 | Stop reason: HOSPADM

## 2021-03-20 RX ORDER — ATORVASTATIN CALCIUM 40 MG/1
40 TABLET, FILM COATED ORAL DAILY
Status: DISCONTINUED | OUTPATIENT
Start: 2021-03-20 | End: 2021-03-25 | Stop reason: HOSPADM

## 2021-03-20 RX ORDER — ATENOLOL 50 MG/1
50 TABLET ORAL DAILY
Status: DISCONTINUED | OUTPATIENT
Start: 2021-03-20 | End: 2021-03-25 | Stop reason: HOSPADM

## 2021-03-20 RX ORDER — SODIUM CHLORIDE 0.9 % (FLUSH) 0.9 %
10 SYRINGE (ML) INJECTION
Status: DISCONTINUED | OUTPATIENT
Start: 2021-03-20 | End: 2021-03-25 | Stop reason: HOSPADM

## 2021-03-20 RX ORDER — DONEPEZIL HYDROCHLORIDE 5 MG/1
5 TABLET, FILM COATED ORAL NIGHTLY
Status: ON HOLD | COMMUNITY
End: 2021-09-05 | Stop reason: SDUPTHER

## 2021-03-20 RX ORDER — SERTRALINE HYDROCHLORIDE 50 MG/1
200 TABLET, FILM COATED ORAL DAILY
Status: DISCONTINUED | OUTPATIENT
Start: 2021-03-21 | End: 2021-03-25 | Stop reason: HOSPADM

## 2021-03-20 RX ORDER — ACETAMINOPHEN 325 MG/1
650 TABLET ORAL EVERY 4 HOURS PRN
Status: DISCONTINUED | OUTPATIENT
Start: 2021-03-20 | End: 2021-03-25 | Stop reason: HOSPADM

## 2021-03-20 RX ORDER — PANTOPRAZOLE SODIUM 40 MG/10ML
80 INJECTION, POWDER, LYOPHILIZED, FOR SOLUTION INTRAVENOUS
Status: COMPLETED | OUTPATIENT
Start: 2021-03-20 | End: 2021-03-20

## 2021-03-20 RX ORDER — ACETAMINOPHEN 325 MG/1
650 TABLET ORAL EVERY 6 HOURS PRN
Status: ON HOLD | COMMUNITY
End: 2021-09-05 | Stop reason: HOSPADM

## 2021-03-20 RX ORDER — OXYBUTYNIN CHLORIDE 5 MG/1
5 TABLET, EXTENDED RELEASE ORAL DAILY
Status: DISCONTINUED | OUTPATIENT
Start: 2021-03-21 | End: 2021-03-25 | Stop reason: HOSPADM

## 2021-03-20 RX ORDER — IRBESARTAN 150 MG/1
300 TABLET ORAL NIGHTLY
Status: DISCONTINUED | OUTPATIENT
Start: 2021-03-20 | End: 2021-03-25 | Stop reason: HOSPADM

## 2021-03-20 RX ORDER — SODIUM CHLORIDE 9 MG/ML
INJECTION, SOLUTION INTRAVENOUS CONTINUOUS
Status: ACTIVE | OUTPATIENT
Start: 2021-03-20 | End: 2021-03-21

## 2021-03-20 RX ADMIN — ATORVASTATIN CALCIUM 40 MG: 40 TABLET, FILM COATED ORAL at 05:03

## 2021-03-20 RX ADMIN — ACETAMINOPHEN 650 MG: 325 TABLET, FILM COATED ORAL at 05:03

## 2021-03-20 RX ADMIN — ATENOLOL 50 MG: 50 TABLET ORAL at 05:03

## 2021-03-20 RX ADMIN — ACETAMINOPHEN 650 MG: 325 TABLET, FILM COATED ORAL at 09:03

## 2021-03-20 RX ADMIN — DONEPEZIL HYDROCHLORIDE 5 MG: 5 TABLET, FILM COATED ORAL at 09:03

## 2021-03-20 RX ADMIN — PANTOPRAZOLE SODIUM 40 MG: 40 INJECTION, POWDER, FOR SOLUTION INTRAVENOUS at 04:03

## 2021-03-20 RX ADMIN — SODIUM CHLORIDE: 0.9 INJECTION, SOLUTION INTRAVENOUS at 04:03

## 2021-03-20 RX ADMIN — SODIUM CHLORIDE 1000 ML: 0.9 INJECTION, SOLUTION INTRAVENOUS at 11:03

## 2021-03-20 RX ADMIN — IRBESARTAN 300 MG: 150 TABLET ORAL at 09:03

## 2021-03-20 RX ADMIN — PANTOPRAZOLE SODIUM 40 MG: 40 INJECTION, POWDER, FOR SOLUTION INTRAVENOUS at 11:03

## 2021-03-20 RX ADMIN — PANTOPRAZOLE SODIUM 80 MG: 40 INJECTION, POWDER, FOR SOLUTION INTRAVENOUS at 11:03

## 2021-03-21 LAB
ANION GAP SERPL CALC-SCNC: 8 MMOL/L (ref 8–16)
APTT PPP: 35.5 SEC (ref 23.6–33.3)
BUN SERPL-MCNC: 27 MG/DL (ref 8–23)
CALCIUM SERPL-MCNC: 8.4 MG/DL (ref 8.7–10.5)
CHLORIDE SERPL-SCNC: 114 MMOL/L (ref 95–110)
CO2 SERPL-SCNC: 23 MMOL/L (ref 23–29)
CREAT SERPL-MCNC: 1.3 MG/DL (ref 0.5–1.4)
EST. GFR  (AFRICAN AMERICAN): >60 ML/MIN/1.73 M^2
EST. GFR  (NON AFRICAN AMERICAN): 58.1 ML/MIN/1.73 M^2
GLUCOSE SERPL-MCNC: 125 MG/DL (ref 70–110)
GLUCOSE SERPL-MCNC: 142 MG/DL (ref 70–110)
GLUCOSE SERPL-MCNC: 79 MG/DL (ref 70–110)
HCT VFR BLD AUTO: 23.4 % (ref 40–54)
HCT VFR BLD AUTO: 24.3 % (ref 40–54)
HCT VFR BLD AUTO: 24.3 % (ref 40–54)
HCT VFR BLD AUTO: 25.2 % (ref 40–54)
HGB BLD-MCNC: 8 G/DL (ref 14–18)
HGB BLD-MCNC: 8.2 G/DL (ref 14–18)
HGB BLD-MCNC: 8.3 G/DL (ref 14–18)
HGB BLD-MCNC: 8.8 G/DL (ref 14–18)
INR PPP: 1.2
MAGNESIUM SERPL-MCNC: 1.7 MG/DL (ref 1.6–2.6)
POTASSIUM SERPL-SCNC: 3.1 MMOL/L (ref 3.5–5.1)
PROTHROMBIN TIME: 15 SEC (ref 10.6–14.8)
SODIUM SERPL-SCNC: 145 MMOL/L (ref 136–145)

## 2021-03-21 PROCEDURE — 97161 PT EVAL LOW COMPLEX 20 MIN: CPT

## 2021-03-21 PROCEDURE — C9113 INJ PANTOPRAZOLE SODIUM, VIA: HCPCS | Performed by: NURSE PRACTITIONER

## 2021-03-21 PROCEDURE — 96376 TX/PRO/DX INJ SAME DRUG ADON: CPT

## 2021-03-21 PROCEDURE — 85610 PROTHROMBIN TIME: CPT | Performed by: NURSE PRACTITIONER

## 2021-03-21 PROCEDURE — 85730 THROMBOPLASTIN TIME PARTIAL: CPT | Performed by: NURSE PRACTITIONER

## 2021-03-21 PROCEDURE — 63600175 PHARM REV CODE 636 W HCPCS: Performed by: NURSE PRACTITIONER

## 2021-03-21 PROCEDURE — 85014 HEMATOCRIT: CPT | Mod: 91 | Performed by: NURSE PRACTITIONER

## 2021-03-21 PROCEDURE — 96372 THER/PROPH/DIAG INJ SC/IM: CPT

## 2021-03-21 PROCEDURE — 25000003 PHARM REV CODE 250: Performed by: NURSE PRACTITIONER

## 2021-03-21 PROCEDURE — 83735 ASSAY OF MAGNESIUM: CPT | Performed by: NURSE PRACTITIONER

## 2021-03-21 PROCEDURE — 85018 HEMOGLOBIN: CPT | Mod: 91 | Performed by: NURSE PRACTITIONER

## 2021-03-21 PROCEDURE — G0378 HOSPITAL OBSERVATION PER HR: HCPCS

## 2021-03-21 PROCEDURE — 25000003 PHARM REV CODE 250: Performed by: INTERNAL MEDICINE

## 2021-03-21 PROCEDURE — C9399 UNCLASSIFIED DRUGS OR BIOLOG: HCPCS | Performed by: NURSE PRACTITIONER

## 2021-03-21 PROCEDURE — 80048 BASIC METABOLIC PNL TOTAL CA: CPT | Performed by: NURSE PRACTITIONER

## 2021-03-21 PROCEDURE — 36415 COLL VENOUS BLD VENIPUNCTURE: CPT | Performed by: NURSE PRACTITIONER

## 2021-03-21 RX ORDER — IBUPROFEN 200 MG
16 TABLET ORAL
Status: DISCONTINUED | OUTPATIENT
Start: 2021-03-21 | End: 2021-03-25 | Stop reason: HOSPADM

## 2021-03-21 RX ORDER — INSULIN ASPART 100 [IU]/ML
0-5 INJECTION, SOLUTION INTRAVENOUS; SUBCUTANEOUS
Status: DISCONTINUED | OUTPATIENT
Start: 2021-03-21 | End: 2021-03-25 | Stop reason: HOSPADM

## 2021-03-21 RX ORDER — OXYMETAZOLINE HCL 0.05 %
2 SPRAY, NON-AEROSOL (ML) NASAL EVERY 6 HOURS
Status: DISPENSED | OUTPATIENT
Start: 2021-03-21 | End: 2021-03-24

## 2021-03-21 RX ORDER — GLUCAGON 1 MG
1 KIT INJECTION
Status: DISCONTINUED | OUTPATIENT
Start: 2021-03-21 | End: 2021-03-25 | Stop reason: HOSPADM

## 2021-03-21 RX ORDER — IBUPROFEN 200 MG
24 TABLET ORAL
Status: DISCONTINUED | OUTPATIENT
Start: 2021-03-21 | End: 2021-03-25 | Stop reason: HOSPADM

## 2021-03-21 RX ADMIN — OXYBUTYNIN CHLORIDE 5 MG: 5 TABLET, EXTENDED RELEASE ORAL at 09:03

## 2021-03-21 RX ADMIN — ACETAMINOPHEN 650 MG: 325 TABLET, FILM COATED ORAL at 12:03

## 2021-03-21 RX ADMIN — Medication 2 SPRAY: at 09:03

## 2021-03-21 RX ADMIN — ACETAMINOPHEN 650 MG: 325 TABLET, FILM COATED ORAL at 07:03

## 2021-03-21 RX ADMIN — DONEPEZIL HYDROCHLORIDE 5 MG: 5 TABLET, FILM COATED ORAL at 09:03

## 2021-03-21 RX ADMIN — ATORVASTATIN CALCIUM 40 MG: 40 TABLET, FILM COATED ORAL at 09:03

## 2021-03-21 RX ADMIN — SERTRALINE HYDROCHLORIDE 200 MG: 50 TABLET ORAL at 09:03

## 2021-03-21 RX ADMIN — IRBESARTAN 300 MG: 150 TABLET ORAL at 09:03

## 2021-03-21 RX ADMIN — INSULIN DETEMIR 12 UNITS: 100 INJECTION, SOLUTION SUBCUTANEOUS at 09:03

## 2021-03-21 RX ADMIN — ATENOLOL 50 MG: 50 TABLET ORAL at 09:03

## 2021-03-21 RX ADMIN — PANTOPRAZOLE SODIUM 40 MG: 40 INJECTION, POWDER, FOR SOLUTION INTRAVENOUS at 12:03

## 2021-03-21 RX ADMIN — PANTOPRAZOLE SODIUM 40 MG: 40 INJECTION, POWDER, FOR SOLUTION INTRAVENOUS at 05:03

## 2021-03-21 RX ADMIN — PANTOPRAZOLE SODIUM 40 MG: 40 INJECTION, POWDER, FOR SOLUTION INTRAVENOUS at 07:03

## 2021-03-21 RX ADMIN — PANTOPRAZOLE SODIUM 40 MG: 40 INJECTION, POWDER, FOR SOLUTION INTRAVENOUS at 09:03

## 2021-03-22 LAB
ALBUMIN SERPL BCP-MCNC: 3.1 G/DL (ref 3.5–5.2)
ALP SERPL-CCNC: 53 U/L (ref 55–135)
ALT SERPL W/O P-5'-P-CCNC: 11 U/L (ref 10–44)
ANION GAP SERPL CALC-SCNC: 7 MMOL/L (ref 8–16)
AST SERPL-CCNC: 15 U/L (ref 10–40)
BASOPHILS # BLD AUTO: 0.02 K/UL (ref 0–0.2)
BASOPHILS NFR BLD: 0.3 % (ref 0–1.9)
BILIRUB SERPL-MCNC: 0.4 MG/DL (ref 0.1–1)
BUN SERPL-MCNC: 17 MG/DL (ref 8–23)
CALCIUM SERPL-MCNC: 8.3 MG/DL (ref 8.7–10.5)
CHLORIDE SERPL-SCNC: 115 MMOL/L (ref 95–110)
CO2 SERPL-SCNC: 23 MMOL/L (ref 23–29)
CREAT SERPL-MCNC: 1.2 MG/DL (ref 0.5–1.4)
DIFFERENTIAL METHOD: ABNORMAL
EOSINOPHIL # BLD AUTO: 0.1 K/UL (ref 0–0.5)
EOSINOPHIL NFR BLD: 1.6 % (ref 0–8)
ERYTHROCYTE [DISTWIDTH] IN BLOOD BY AUTOMATED COUNT: 13.6 % (ref 11.5–14.5)
EST. GFR  (AFRICAN AMERICAN): >60 ML/MIN/1.73 M^2
EST. GFR  (NON AFRICAN AMERICAN): >60 ML/MIN/1.73 M^2
GLUCOSE SERPL-MCNC: 151 MG/DL (ref 70–110)
GLUCOSE SERPL-MCNC: 152 MG/DL (ref 70–110)
GLUCOSE SERPL-MCNC: 174 MG/DL (ref 70–110)
GLUCOSE SERPL-MCNC: 193 MG/DL (ref 70–110)
GLUCOSE SERPL-MCNC: 239 MG/DL (ref 70–110)
HCT VFR BLD AUTO: 22.4 % (ref 40–54)
HGB BLD-MCNC: 7.7 G/DL (ref 14–18)
IMM GRANULOCYTES # BLD AUTO: 0.02 K/UL (ref 0–0.04)
IMM GRANULOCYTES NFR BLD AUTO: 0.3 % (ref 0–0.5)
LYMPHOCYTES # BLD AUTO: 1 K/UL (ref 1–4.8)
LYMPHOCYTES NFR BLD: 15 % (ref 18–48)
MAGNESIUM SERPL-MCNC: 1.7 MG/DL (ref 1.6–2.6)
MCH RBC QN AUTO: 29.4 PG (ref 27–31)
MCHC RBC AUTO-ENTMCNC: 34.4 G/DL (ref 32–36)
MCV RBC AUTO: 86 FL (ref 82–98)
MONOCYTES # BLD AUTO: 0.8 K/UL (ref 0.3–1)
MONOCYTES NFR BLD: 12.3 % (ref 4–15)
NEUTROPHILS # BLD AUTO: 4.7 K/UL (ref 1.8–7.7)
NEUTROPHILS NFR BLD: 70.5 % (ref 38–73)
NRBC BLD-RTO: 0 /100 WBC
PLATELET # BLD AUTO: 175 K/UL (ref 150–350)
PMV BLD AUTO: 9.3 FL (ref 9.2–12.9)
POTASSIUM SERPL-SCNC: 3.2 MMOL/L (ref 3.5–5.1)
PROT SERPL-MCNC: 5.7 G/DL (ref 6–8.4)
RBC # BLD AUTO: 2.62 M/UL (ref 4.6–6.2)
SODIUM SERPL-SCNC: 145 MMOL/L (ref 136–145)
WBC # BLD AUTO: 6.67 K/UL (ref 3.9–12.7)

## 2021-03-22 PROCEDURE — 25000003 PHARM REV CODE 250: Performed by: NURSE PRACTITIONER

## 2021-03-22 PROCEDURE — 96372 THER/PROPH/DIAG INJ SC/IM: CPT | Mod: 59

## 2021-03-22 PROCEDURE — 80053 COMPREHEN METABOLIC PANEL: CPT | Performed by: INTERNAL MEDICINE

## 2021-03-22 PROCEDURE — 97110 THERAPEUTIC EXERCISES: CPT

## 2021-03-22 PROCEDURE — 97116 GAIT TRAINING THERAPY: CPT

## 2021-03-22 PROCEDURE — 85025 COMPLETE CBC W/AUTO DIFF WBC: CPT | Performed by: INTERNAL MEDICINE

## 2021-03-22 PROCEDURE — 63600175 PHARM REV CODE 636 W HCPCS: Performed by: NURSE PRACTITIONER

## 2021-03-22 PROCEDURE — G0378 HOSPITAL OBSERVATION PER HR: HCPCS

## 2021-03-22 PROCEDURE — 83735 ASSAY OF MAGNESIUM: CPT | Performed by: INTERNAL MEDICINE

## 2021-03-22 PROCEDURE — 96376 TX/PRO/DX INJ SAME DRUG ADON: CPT

## 2021-03-22 PROCEDURE — 36415 COLL VENOUS BLD VENIPUNCTURE: CPT | Performed by: INTERNAL MEDICINE

## 2021-03-22 PROCEDURE — C9113 INJ PANTOPRAZOLE SODIUM, VIA: HCPCS | Performed by: NURSE PRACTITIONER

## 2021-03-22 PROCEDURE — 25000003 PHARM REV CODE 250: Performed by: INTERNAL MEDICINE

## 2021-03-22 PROCEDURE — 82962 GLUCOSE BLOOD TEST: CPT

## 2021-03-22 RX ORDER — POTASSIUM CHLORIDE 20 MEQ/1
20 TABLET, EXTENDED RELEASE ORAL
Status: DISCONTINUED | OUTPATIENT
Start: 2021-03-22 | End: 2021-03-25 | Stop reason: HOSPADM

## 2021-03-22 RX ORDER — POTASSIUM CHLORIDE 7.45 MG/ML
40 INJECTION INTRAVENOUS
Status: DISCONTINUED | OUTPATIENT
Start: 2021-03-22 | End: 2021-03-25 | Stop reason: HOSPADM

## 2021-03-22 RX ORDER — MAGNESIUM SULFATE HEPTAHYDRATE 40 MG/ML
2 INJECTION, SOLUTION INTRAVENOUS
Status: DISCONTINUED | OUTPATIENT
Start: 2021-03-22 | End: 2021-03-25 | Stop reason: HOSPADM

## 2021-03-22 RX ORDER — MAGNESIUM SULFATE HEPTAHYDRATE 40 MG/ML
4 INJECTION, SOLUTION INTRAVENOUS
Status: DISCONTINUED | OUTPATIENT
Start: 2021-03-22 | End: 2021-03-25 | Stop reason: HOSPADM

## 2021-03-22 RX ORDER — LANOLIN ALCOHOL/MO/W.PET/CERES
800 CREAM (GRAM) TOPICAL
Status: DISCONTINUED | OUTPATIENT
Start: 2021-03-22 | End: 2021-03-25 | Stop reason: HOSPADM

## 2021-03-22 RX ORDER — POTASSIUM CHLORIDE 20 MEQ/1
40 TABLET, EXTENDED RELEASE ORAL
Status: DISCONTINUED | OUTPATIENT
Start: 2021-03-22 | End: 2021-03-25 | Stop reason: HOSPADM

## 2021-03-22 RX ORDER — POTASSIUM CHLORIDE 7.45 MG/ML
20 INJECTION INTRAVENOUS
Status: DISCONTINUED | OUTPATIENT
Start: 2021-03-22 | End: 2021-03-25 | Stop reason: HOSPADM

## 2021-03-22 RX ORDER — MAGNESIUM SULFATE 1 G/100ML
1 INJECTION INTRAVENOUS
Status: DISCONTINUED | OUTPATIENT
Start: 2021-03-22 | End: 2021-03-25 | Stop reason: HOSPADM

## 2021-03-22 RX ADMIN — MAGNESIUM OXIDE 800 MG: 400 TABLET ORAL at 01:03

## 2021-03-22 RX ADMIN — Medication 2 SPRAY: at 05:03

## 2021-03-22 RX ADMIN — INSULIN DETEMIR 12 UNITS: 100 INJECTION, SOLUTION SUBCUTANEOUS at 09:03

## 2021-03-22 RX ADMIN — MAGNESIUM OXIDE 800 MG: 400 TABLET ORAL at 08:03

## 2021-03-22 RX ADMIN — ACETAMINOPHEN 650 MG: 325 TABLET, FILM COATED ORAL at 01:03

## 2021-03-22 RX ADMIN — Medication 2 SPRAY: at 09:03

## 2021-03-22 RX ADMIN — ACETAMINOPHEN 650 MG: 325 TABLET, FILM COATED ORAL at 12:03

## 2021-03-22 RX ADMIN — ATORVASTATIN CALCIUM 40 MG: 40 TABLET, FILM COATED ORAL at 08:03

## 2021-03-22 RX ADMIN — POTASSIUM CHLORIDE 40 MEQ: 1500 TABLET, EXTENDED RELEASE ORAL at 08:03

## 2021-03-22 RX ADMIN — SERTRALINE HYDROCHLORIDE 200 MG: 50 TABLET ORAL at 08:03

## 2021-03-22 RX ADMIN — IRBESARTAN 300 MG: 150 TABLET ORAL at 09:03

## 2021-03-22 RX ADMIN — ACETAMINOPHEN 650 MG: 325 TABLET, FILM COATED ORAL at 09:03

## 2021-03-22 RX ADMIN — OXYBUTYNIN CHLORIDE 5 MG: 5 TABLET, EXTENDED RELEASE ORAL at 08:03

## 2021-03-22 RX ADMIN — PANTOPRAZOLE SODIUM 40 MG: 40 INJECTION, POWDER, FOR SOLUTION INTRAVENOUS at 02:03

## 2021-03-22 RX ADMIN — ATENOLOL 50 MG: 50 TABLET ORAL at 08:03

## 2021-03-22 RX ADMIN — DONEPEZIL HYDROCHLORIDE 5 MG: 5 TABLET, FILM COATED ORAL at 09:03

## 2021-03-23 PROBLEM — R04.0 EPISTAXIS: Status: ACTIVE | Noted: 2021-03-23

## 2021-03-23 PROBLEM — D62 ACUTE BLOOD LOSS ANEMIA: Status: RESOLVED | Noted: 2017-05-28 | Resolved: 2021-03-23

## 2021-03-23 PROBLEM — K92.2 UPPER GI BLEED: Status: RESOLVED | Noted: 2021-03-20 | Resolved: 2021-03-23

## 2021-03-23 LAB
ALBUMIN SERPL BCP-MCNC: 3.2 G/DL (ref 3.5–5.2)
ALP SERPL-CCNC: 54 U/L (ref 55–135)
ALT SERPL W/O P-5'-P-CCNC: 11 U/L (ref 10–44)
ANION GAP SERPL CALC-SCNC: 10 MMOL/L (ref 8–16)
AST SERPL-CCNC: 14 U/L (ref 10–40)
BASOPHILS # BLD AUTO: 0.02 K/UL (ref 0–0.2)
BASOPHILS NFR BLD: 0.3 % (ref 0–1.9)
BILIRUB SERPL-MCNC: 0.4 MG/DL (ref 0.1–1)
BUN SERPL-MCNC: 18 MG/DL (ref 8–23)
CALCIUM SERPL-MCNC: 8.4 MG/DL (ref 8.7–10.5)
CHLORIDE SERPL-SCNC: 111 MMOL/L (ref 95–110)
CO2 SERPL-SCNC: 24 MMOL/L (ref 23–29)
CREAT SERPL-MCNC: 1.2 MG/DL (ref 0.5–1.4)
DIFFERENTIAL METHOD: ABNORMAL
EOSINOPHIL # BLD AUTO: 0.1 K/UL (ref 0–0.5)
EOSINOPHIL NFR BLD: 1.8 % (ref 0–8)
ERYTHROCYTE [DISTWIDTH] IN BLOOD BY AUTOMATED COUNT: 14 % (ref 11.5–14.5)
EST. GFR  (AFRICAN AMERICAN): >60 ML/MIN/1.73 M^2
EST. GFR  (NON AFRICAN AMERICAN): >60 ML/MIN/1.73 M^2
GLUCOSE SERPL-MCNC: 149 MG/DL (ref 70–110)
GLUCOSE SERPL-MCNC: 165 MG/DL (ref 70–110)
GLUCOSE SERPL-MCNC: 168 MG/DL (ref 70–110)
GLUCOSE SERPL-MCNC: 182 MG/DL (ref 70–110)
HCT VFR BLD AUTO: 24.1 % (ref 40–54)
HGB BLD-MCNC: 8.1 G/DL (ref 14–18)
IMM GRANULOCYTES # BLD AUTO: 0.03 K/UL (ref 0–0.04)
IMM GRANULOCYTES NFR BLD AUTO: 0.5 % (ref 0–0.5)
LYMPHOCYTES # BLD AUTO: 1.3 K/UL (ref 1–4.8)
LYMPHOCYTES NFR BLD: 20.1 % (ref 18–48)
MAGNESIUM SERPL-MCNC: 1.9 MG/DL (ref 1.6–2.6)
MCH RBC QN AUTO: 29.6 PG (ref 27–31)
MCHC RBC AUTO-ENTMCNC: 33.6 G/DL (ref 32–36)
MCV RBC AUTO: 88 FL (ref 82–98)
MONOCYTES # BLD AUTO: 0.7 K/UL (ref 0.3–1)
MONOCYTES NFR BLD: 11.2 % (ref 4–15)
NEUTROPHILS # BLD AUTO: 4.4 K/UL (ref 1.8–7.7)
NEUTROPHILS NFR BLD: 66.1 % (ref 38–73)
NRBC BLD-RTO: 0 /100 WBC
PLATELET # BLD AUTO: 190 K/UL (ref 150–350)
PMV BLD AUTO: 9.4 FL (ref 9.2–12.9)
POTASSIUM SERPL-SCNC: 3.2 MMOL/L (ref 3.5–5.1)
PROT SERPL-MCNC: 6 G/DL (ref 6–8.4)
RBC # BLD AUTO: 2.74 M/UL (ref 4.6–6.2)
SODIUM SERPL-SCNC: 145 MMOL/L (ref 136–145)
WBC # BLD AUTO: 6.61 K/UL (ref 3.9–12.7)

## 2021-03-23 PROCEDURE — 25000003 PHARM REV CODE 250: Performed by: FAMILY MEDICINE

## 2021-03-23 PROCEDURE — 25000003 PHARM REV CODE 250: Performed by: INTERNAL MEDICINE

## 2021-03-23 PROCEDURE — 25000003 PHARM REV CODE 250: Performed by: NURSE PRACTITIONER

## 2021-03-23 PROCEDURE — 83735 ASSAY OF MAGNESIUM: CPT | Performed by: INTERNAL MEDICINE

## 2021-03-23 PROCEDURE — 12000002 HC ACUTE/MED SURGE SEMI-PRIVATE ROOM

## 2021-03-23 PROCEDURE — 97116 GAIT TRAINING THERAPY: CPT | Mod: CQ

## 2021-03-23 PROCEDURE — 80053 COMPREHEN METABOLIC PANEL: CPT | Performed by: INTERNAL MEDICINE

## 2021-03-23 PROCEDURE — 36415 COLL VENOUS BLD VENIPUNCTURE: CPT | Performed by: INTERNAL MEDICINE

## 2021-03-23 PROCEDURE — 85025 COMPLETE CBC W/AUTO DIFF WBC: CPT | Performed by: INTERNAL MEDICINE

## 2021-03-23 RX ORDER — LANOLIN ALCOHOL/MO/W.PET/CERES
800 CREAM (GRAM) TOPICAL ONCE
Status: COMPLETED | OUTPATIENT
Start: 2021-03-23 | End: 2021-03-23

## 2021-03-23 RX ORDER — HYDROCODONE BITARTRATE AND ACETAMINOPHEN 5; 325 MG/1; MG/1
1 TABLET ORAL EVERY 4 HOURS PRN
Status: DISCONTINUED | OUTPATIENT
Start: 2021-03-23 | End: 2021-03-25 | Stop reason: HOSPADM

## 2021-03-23 RX ORDER — QUETIAPINE FUMARATE 25 MG/1
25 TABLET, FILM COATED ORAL NIGHTLY PRN
Status: ON HOLD
Start: 2021-03-23 | End: 2021-09-05 | Stop reason: SDUPTHER

## 2021-03-23 RX ORDER — PANTOPRAZOLE SODIUM 40 MG/1
40 TABLET, DELAYED RELEASE ORAL 2 TIMES DAILY
Status: DISCONTINUED | OUTPATIENT
Start: 2021-03-23 | End: 2021-03-25 | Stop reason: HOSPADM

## 2021-03-23 RX ADMIN — HYDROCODONE BITARTRATE AND ACETAMINOPHEN 1 TABLET: 5; 325 TABLET ORAL at 07:03

## 2021-03-23 RX ADMIN — Medication 2 SPRAY: at 11:03

## 2021-03-23 RX ADMIN — DONEPEZIL HYDROCHLORIDE 5 MG: 5 TABLET, FILM COATED ORAL at 08:03

## 2021-03-23 RX ADMIN — INSULIN DETEMIR 12 UNITS: 100 INJECTION, SOLUTION SUBCUTANEOUS at 08:03

## 2021-03-23 RX ADMIN — MAGNESIUM OXIDE 800 MG: 400 TABLET ORAL at 12:03

## 2021-03-23 RX ADMIN — IRBESARTAN 300 MG: 150 TABLET ORAL at 08:03

## 2021-03-23 RX ADMIN — Medication 2 SPRAY: at 05:03

## 2021-03-23 RX ADMIN — HYDROCODONE BITARTRATE AND ACETAMINOPHEN 1 TABLET: 5; 325 TABLET ORAL at 12:03

## 2021-03-23 RX ADMIN — ATORVASTATIN CALCIUM 40 MG: 40 TABLET, FILM COATED ORAL at 10:03

## 2021-03-23 RX ADMIN — ATENOLOL 50 MG: 50 TABLET ORAL at 10:03

## 2021-03-23 RX ADMIN — POTASSIUM CHLORIDE 40 MEQ: 20 TABLET, EXTENDED RELEASE ORAL at 12:03

## 2021-03-23 RX ADMIN — PANTOPRAZOLE SODIUM 40 MG: 40 TABLET, DELAYED RELEASE ORAL at 12:03

## 2021-03-23 RX ADMIN — ACETAMINOPHEN 650 MG: 325 TABLET, FILM COATED ORAL at 10:03

## 2021-03-23 RX ADMIN — SERTRALINE HYDROCHLORIDE 200 MG: 50 TABLET ORAL at 10:03

## 2021-03-23 RX ADMIN — OXYBUTYNIN CHLORIDE 5 MG: 5 TABLET, EXTENDED RELEASE ORAL at 10:03

## 2021-03-24 LAB
ALBUMIN SERPL BCP-MCNC: 3.2 G/DL (ref 3.5–5.2)
ALP SERPL-CCNC: 53 U/L (ref 55–135)
ALT SERPL W/O P-5'-P-CCNC: 9 U/L (ref 10–44)
ANION GAP SERPL CALC-SCNC: 7 MMOL/L (ref 8–16)
AST SERPL-CCNC: 12 U/L (ref 10–40)
BASOPHILS # BLD AUTO: 0.03 K/UL (ref 0–0.2)
BASOPHILS NFR BLD: 0.4 % (ref 0–1.9)
BILIRUB SERPL-MCNC: 0.6 MG/DL (ref 0.1–1)
BUN SERPL-MCNC: 20 MG/DL (ref 8–23)
CALCIUM SERPL-MCNC: 8.1 MG/DL (ref 8.7–10.5)
CHLORIDE SERPL-SCNC: 114 MMOL/L (ref 95–110)
CO2 SERPL-SCNC: 24 MMOL/L (ref 23–29)
CREAT SERPL-MCNC: 1.2 MG/DL (ref 0.5–1.4)
DIFFERENTIAL METHOD: ABNORMAL
EOSINOPHIL # BLD AUTO: 0.1 K/UL (ref 0–0.5)
EOSINOPHIL NFR BLD: 1.4 % (ref 0–8)
ERYTHROCYTE [DISTWIDTH] IN BLOOD BY AUTOMATED COUNT: 14.3 % (ref 11.5–14.5)
EST. GFR  (AFRICAN AMERICAN): >60 ML/MIN/1.73 M^2
EST. GFR  (NON AFRICAN AMERICAN): >60 ML/MIN/1.73 M^2
GLUCOSE SERPL-MCNC: 153 MG/DL (ref 70–110)
GLUCOSE SERPL-MCNC: 169 MG/DL (ref 70–110)
GLUCOSE SERPL-MCNC: 199 MG/DL (ref 70–110)
GLUCOSE SERPL-MCNC: 233 MG/DL (ref 70–110)
GLUCOSE SERPL-MCNC: 287 MG/DL (ref 70–110)
HCT VFR BLD AUTO: 23 % (ref 40–54)
HGB BLD-MCNC: 7.6 G/DL (ref 14–18)
IMM GRANULOCYTES # BLD AUTO: 0.04 K/UL (ref 0–0.04)
IMM GRANULOCYTES NFR BLD AUTO: 0.5 % (ref 0–0.5)
LYMPHOCYTES # BLD AUTO: 1.3 K/UL (ref 1–4.8)
LYMPHOCYTES NFR BLD: 16.3 % (ref 18–48)
MAGNESIUM SERPL-MCNC: 2 MG/DL (ref 1.6–2.6)
MCH RBC QN AUTO: 29.1 PG (ref 27–31)
MCHC RBC AUTO-ENTMCNC: 33 G/DL (ref 32–36)
MCV RBC AUTO: 88 FL (ref 82–98)
MONOCYTES # BLD AUTO: 0.8 K/UL (ref 0.3–1)
MONOCYTES NFR BLD: 10.4 % (ref 4–15)
NEUTROPHILS # BLD AUTO: 5.6 K/UL (ref 1.8–7.7)
NEUTROPHILS NFR BLD: 71 % (ref 38–73)
NRBC BLD-RTO: 0 /100 WBC
PLATELET # BLD AUTO: 176 K/UL (ref 150–350)
PMV BLD AUTO: 10 FL (ref 9.2–12.9)
POTASSIUM SERPL-SCNC: 3.9 MMOL/L (ref 3.5–5.1)
PROT SERPL-MCNC: 5.9 G/DL (ref 6–8.4)
RBC # BLD AUTO: 2.61 M/UL (ref 4.6–6.2)
SODIUM SERPL-SCNC: 145 MMOL/L (ref 136–145)
WBC # BLD AUTO: 7.91 K/UL (ref 3.9–12.7)

## 2021-03-24 PROCEDURE — 63600175 PHARM REV CODE 636 W HCPCS: Performed by: NURSE PRACTITIONER

## 2021-03-24 PROCEDURE — 25000003 PHARM REV CODE 250: Performed by: FAMILY MEDICINE

## 2021-03-24 PROCEDURE — 30200315 PPD INTRADERMAL TEST REV CODE 302: Performed by: FAMILY MEDICINE

## 2021-03-24 PROCEDURE — 25000003 PHARM REV CODE 250: Performed by: NURSE PRACTITIONER

## 2021-03-24 PROCEDURE — 85025 COMPLETE CBC W/AUTO DIFF WBC: CPT | Performed by: INTERNAL MEDICINE

## 2021-03-24 PROCEDURE — 97110 THERAPEUTIC EXERCISES: CPT | Mod: CQ

## 2021-03-24 PROCEDURE — 86920 COMPATIBILITY TEST SPIN: CPT | Performed by: FAMILY MEDICINE

## 2021-03-24 PROCEDURE — 97116 GAIT TRAINING THERAPY: CPT | Mod: CQ

## 2021-03-24 PROCEDURE — 36415 COLL VENOUS BLD VENIPUNCTURE: CPT | Performed by: INTERNAL MEDICINE

## 2021-03-24 PROCEDURE — 12000002 HC ACUTE/MED SURGE SEMI-PRIVATE ROOM

## 2021-03-24 PROCEDURE — 86580 TB INTRADERMAL TEST: CPT | Performed by: FAMILY MEDICINE

## 2021-03-24 PROCEDURE — 80053 COMPREHEN METABOLIC PANEL: CPT | Performed by: INTERNAL MEDICINE

## 2021-03-24 PROCEDURE — 25000003 PHARM REV CODE 250: Performed by: INTERNAL MEDICINE

## 2021-03-24 PROCEDURE — 83735 ASSAY OF MAGNESIUM: CPT | Performed by: INTERNAL MEDICINE

## 2021-03-24 RX ORDER — BUTALBITAL, ACETAMINOPHEN AND CAFFEINE 50; 325; 40 MG/1; MG/1; MG/1
1 TABLET ORAL ONCE
Status: COMPLETED | OUTPATIENT
Start: 2021-03-24 | End: 2021-03-24

## 2021-03-24 RX ORDER — BUTALBITAL, ACETAMINOPHEN AND CAFFEINE 50; 325; 40 MG/1; MG/1; MG/1
1 TABLET ORAL EVERY 4 HOURS PRN
Status: DISCONTINUED | OUTPATIENT
Start: 2021-03-24 | End: 2021-03-25 | Stop reason: HOSPADM

## 2021-03-24 RX ORDER — AMOXICILLIN AND CLAVULANATE POTASSIUM 875; 125 MG/1; MG/1
1 TABLET, FILM COATED ORAL EVERY 12 HOURS
Status: DISCONTINUED | OUTPATIENT
Start: 2021-03-24 | End: 2021-03-24

## 2021-03-24 RX ORDER — HYDROCODONE BITARTRATE AND ACETAMINOPHEN 500; 5 MG/1; MG/1
TABLET ORAL
Status: DISCONTINUED | OUTPATIENT
Start: 2021-03-24 | End: 2021-03-25 | Stop reason: HOSPADM

## 2021-03-24 RX ORDER — AMOXICILLIN AND CLAVULANATE POTASSIUM 875; 125 MG/1; MG/1
1 TABLET, FILM COATED ORAL EVERY 12 HOURS
Status: DISCONTINUED | OUTPATIENT
Start: 2021-03-24 | End: 2021-03-25 | Stop reason: HOSPADM

## 2021-03-24 RX ADMIN — ATORVASTATIN CALCIUM 40 MG: 40 TABLET, FILM COATED ORAL at 08:03

## 2021-03-24 RX ADMIN — BUTALBITAL, ACETAMINOPHEN AND CAFFEINE 1 TABLET: 50; 325; 40 TABLET ORAL at 03:03

## 2021-03-24 RX ADMIN — INSULIN DETEMIR 12 UNITS: 100 INJECTION, SOLUTION SUBCUTANEOUS at 08:03

## 2021-03-24 RX ADMIN — Medication 2 SPRAY: at 06:03

## 2021-03-24 RX ADMIN — IRBESARTAN 300 MG: 150 TABLET ORAL at 08:03

## 2021-03-24 RX ADMIN — POTASSIUM CHLORIDE 20 MEQ: 20 TABLET, EXTENDED RELEASE ORAL at 08:03

## 2021-03-24 RX ADMIN — DONEPEZIL HYDROCHLORIDE 5 MG: 5 TABLET, FILM COATED ORAL at 08:03

## 2021-03-24 RX ADMIN — AMOXICILLIN AND CLAVULANATE POTASSIUM 1 TABLET: 875; 125 TABLET, FILM COATED ORAL at 06:03

## 2021-03-24 RX ADMIN — PANTOPRAZOLE SODIUM 40 MG: 40 TABLET, DELAYED RELEASE ORAL at 03:03

## 2021-03-24 RX ADMIN — LACTOBACILLUS TAB 4 TABLET: TAB at 06:03

## 2021-03-24 RX ADMIN — PANTOPRAZOLE SODIUM 40 MG: 40 TABLET, DELAYED RELEASE ORAL at 05:03

## 2021-03-24 RX ADMIN — ACETAMINOPHEN 650 MG: 325 TABLET, FILM COATED ORAL at 08:03

## 2021-03-24 RX ADMIN — INSULIN ASPART 1 UNITS: 100 INJECTION, SOLUTION INTRAVENOUS; SUBCUTANEOUS at 08:03

## 2021-03-24 RX ADMIN — SERTRALINE HYDROCHLORIDE 200 MG: 50 TABLET ORAL at 08:03

## 2021-03-24 RX ADMIN — POTASSIUM CHLORIDE 20 MEQ: 1500 TABLET, EXTENDED RELEASE ORAL at 08:03

## 2021-03-24 RX ADMIN — HYDROCODONE BITARTRATE AND ACETAMINOPHEN 1 TABLET: 5; 325 TABLET ORAL at 10:03

## 2021-03-24 RX ADMIN — Medication 2 SPRAY: at 05:03

## 2021-03-24 RX ADMIN — TUBERCULIN PURIFIED PROTEIN DERIVATIVE 5 UNITS: 5 INJECTION, SOLUTION INTRADERMAL at 06:03

## 2021-03-24 RX ADMIN — OXYBUTYNIN CHLORIDE 5 MG: 5 TABLET, EXTENDED RELEASE ORAL at 08:03

## 2021-03-24 RX ADMIN — Medication 2 SPRAY: at 12:03

## 2021-03-24 RX ADMIN — ATENOLOL 50 MG: 50 TABLET ORAL at 08:03

## 2021-03-25 VITALS
BODY MASS INDEX: 29.5 KG/M2 | OXYGEN SATURATION: 100 % | RESPIRATION RATE: 18 BRPM | WEIGHT: 210.75 LBS | HEART RATE: 53 BPM | DIASTOLIC BLOOD PRESSURE: 84 MMHG | SYSTOLIC BLOOD PRESSURE: 159 MMHG | TEMPERATURE: 97 F | HEIGHT: 71 IN

## 2021-03-25 PROBLEM — T45.1X5A ANEMIA ASSOCIATED WITH CHEMOTHERAPY: Status: RESOLVED | Noted: 2019-07-18 | Resolved: 2021-03-25

## 2021-03-25 PROBLEM — R19.7 DIARRHEA: Status: RESOLVED | Noted: 2019-07-18 | Resolved: 2021-03-25

## 2021-03-25 PROBLEM — D64.81 ANEMIA ASSOCIATED WITH CHEMOTHERAPY: Status: RESOLVED | Noted: 2019-07-18 | Resolved: 2021-03-25

## 2021-03-25 PROBLEM — R04.0 EPISTAXIS: Status: RESOLVED | Noted: 2021-03-23 | Resolved: 2021-03-25

## 2021-03-25 LAB
ABO + RH BLD: NORMAL
ALBUMIN SERPL BCP-MCNC: 3.2 G/DL (ref 3.5–5.2)
ALP SERPL-CCNC: 51 U/L (ref 55–135)
ALT SERPL W/O P-5'-P-CCNC: 9 U/L (ref 10–44)
ANION GAP SERPL CALC-SCNC: 8 MMOL/L (ref 8–16)
AST SERPL-CCNC: 12 U/L (ref 10–40)
BASOPHILS # BLD AUTO: 0.04 K/UL (ref 0–0.2)
BASOPHILS NFR BLD: 0.5 % (ref 0–1.9)
BILIRUB SERPL-MCNC: 0.6 MG/DL (ref 0.1–1)
BLD GP AB SCN CELLS X3 SERPL QL: NORMAL
BLD PROD TYP BPU: NORMAL
BLOOD UNIT EXPIRATION DATE: NORMAL
BLOOD UNIT TYPE CODE: 5100
BLOOD UNIT TYPE: NORMAL
BUN SERPL-MCNC: 18 MG/DL (ref 8–23)
CALCIUM SERPL-MCNC: 7.8 MG/DL (ref 8.7–10.5)
CHLORIDE SERPL-SCNC: 110 MMOL/L (ref 95–110)
CO2 SERPL-SCNC: 22 MMOL/L (ref 23–29)
CODING SYSTEM: NORMAL
CREAT SERPL-MCNC: 1.1 MG/DL (ref 0.5–1.4)
DIFFERENTIAL METHOD: ABNORMAL
DISPENSE STATUS: NORMAL
EOSINOPHIL # BLD AUTO: 0.1 K/UL (ref 0–0.5)
EOSINOPHIL NFR BLD: 1.7 % (ref 0–8)
ERYTHROCYTE [DISTWIDTH] IN BLOOD BY AUTOMATED COUNT: 14.6 % (ref 11.5–14.5)
EST. GFR  (AFRICAN AMERICAN): >60 ML/MIN/1.73 M^2
EST. GFR  (NON AFRICAN AMERICAN): >60 ML/MIN/1.73 M^2
GLUCOSE SERPL-MCNC: 117 MG/DL (ref 70–110)
GLUCOSE SERPL-MCNC: 134 MG/DL (ref 70–110)
GLUCOSE SERPL-MCNC: 155 MG/DL (ref 70–110)
HCT VFR BLD AUTO: 22.8 % (ref 40–54)
HCT VFR BLD AUTO: 25.8 % (ref 40–54)
HGB BLD-MCNC: 7.5 G/DL (ref 14–18)
HGB BLD-MCNC: 8.7 G/DL (ref 14–18)
IMM GRANULOCYTES # BLD AUTO: 0.04 K/UL (ref 0–0.04)
IMM GRANULOCYTES NFR BLD AUTO: 0.5 % (ref 0–0.5)
LYMPHOCYTES # BLD AUTO: 1.3 K/UL (ref 1–4.8)
LYMPHOCYTES NFR BLD: 17.2 % (ref 18–48)
MAGNESIUM SERPL-MCNC: 1.9 MG/DL (ref 1.6–2.6)
MCH RBC QN AUTO: 29.2 PG (ref 27–31)
MCHC RBC AUTO-ENTMCNC: 32.9 G/DL (ref 32–36)
MCV RBC AUTO: 89 FL (ref 82–98)
MONOCYTES # BLD AUTO: 0.7 K/UL (ref 0.3–1)
MONOCYTES NFR BLD: 9.5 % (ref 4–15)
NEUTROPHILS # BLD AUTO: 5.5 K/UL (ref 1.8–7.7)
NEUTROPHILS NFR BLD: 70.6 % (ref 38–73)
NRBC BLD-RTO: 0 /100 WBC
NUM UNITS TRANS PACKED RBC: NORMAL
PLATELET # BLD AUTO: 182 K/UL (ref 150–350)
PMV BLD AUTO: 10.2 FL (ref 9.2–12.9)
POTASSIUM SERPL-SCNC: 3.9 MMOL/L (ref 3.5–5.1)
PROT SERPL-MCNC: 6 G/DL (ref 6–8.4)
RBC # BLD AUTO: 2.57 M/UL (ref 4.6–6.2)
SARS-COV-2 RDRP RESP QL NAA+PROBE: NEGATIVE
SODIUM SERPL-SCNC: 140 MMOL/L (ref 136–145)
WBC # BLD AUTO: 7.8 K/UL (ref 3.9–12.7)

## 2021-03-25 PROCEDURE — U0002 COVID-19 LAB TEST NON-CDC: HCPCS | Performed by: FAMILY MEDICINE

## 2021-03-25 PROCEDURE — P9016 RBC LEUKOCYTES REDUCED: HCPCS | Performed by: FAMILY MEDICINE

## 2021-03-25 PROCEDURE — 83735 ASSAY OF MAGNESIUM: CPT | Performed by: INTERNAL MEDICINE

## 2021-03-25 PROCEDURE — 25000003 PHARM REV CODE 250: Performed by: NURSE PRACTITIONER

## 2021-03-25 PROCEDURE — 86900 BLOOD TYPING SEROLOGIC ABO: CPT | Performed by: FAMILY MEDICINE

## 2021-03-25 PROCEDURE — 36430 TRANSFUSION BLD/BLD COMPNT: CPT

## 2021-03-25 PROCEDURE — 80053 COMPREHEN METABOLIC PANEL: CPT | Performed by: INTERNAL MEDICINE

## 2021-03-25 PROCEDURE — 36415 COLL VENOUS BLD VENIPUNCTURE: CPT | Performed by: FAMILY MEDICINE

## 2021-03-25 PROCEDURE — 85014 HEMATOCRIT: CPT | Performed by: FAMILY MEDICINE

## 2021-03-25 PROCEDURE — 25000003 PHARM REV CODE 250: Performed by: INTERNAL MEDICINE

## 2021-03-25 PROCEDURE — 25000003 PHARM REV CODE 250: Performed by: FAMILY MEDICINE

## 2021-03-25 PROCEDURE — 85018 HEMOGLOBIN: CPT | Performed by: FAMILY MEDICINE

## 2021-03-25 PROCEDURE — 97116 GAIT TRAINING THERAPY: CPT

## 2021-03-25 PROCEDURE — 85025 COMPLETE CBC W/AUTO DIFF WBC: CPT | Performed by: INTERNAL MEDICINE

## 2021-03-25 RX ORDER — PANTOPRAZOLE SODIUM 40 MG/1
40 TABLET, DELAYED RELEASE ORAL 2 TIMES DAILY
Qty: 60 TABLET | Refills: 11 | Status: ON HOLD
Start: 2021-03-25 | End: 2021-09-05 | Stop reason: SDUPTHER

## 2021-03-25 RX ORDER — AMOXICILLIN AND CLAVULANATE POTASSIUM 875; 125 MG/1; MG/1
1 TABLET, FILM COATED ORAL EVERY 12 HOURS
Qty: 14 TABLET | Refills: 0 | Status: SHIPPED | OUTPATIENT
Start: 2021-03-25 | End: 2021-04-01

## 2021-03-25 RX ORDER — LACTOBACILLUS COMBINATION NO.4 3B CELL
1 CAPSULE ORAL DAILY
Start: 2021-03-25

## 2021-03-25 RX ADMIN — OXYBUTYNIN CHLORIDE 5 MG: 5 TABLET, EXTENDED RELEASE ORAL at 09:03

## 2021-03-25 RX ADMIN — ATORVASTATIN CALCIUM 40 MG: 40 TABLET, FILM COATED ORAL at 09:03

## 2021-03-25 RX ADMIN — LACTOBACILLUS TAB 4 TABLET: TAB at 09:03

## 2021-03-25 RX ADMIN — PANTOPRAZOLE SODIUM 40 MG: 40 TABLET, DELAYED RELEASE ORAL at 06:03

## 2021-03-25 RX ADMIN — POTASSIUM CHLORIDE 20 MEQ: 1500 TABLET, EXTENDED RELEASE ORAL at 09:03

## 2021-03-25 RX ADMIN — LACTOBACILLUS TAB 4 TABLET: TAB at 12:03

## 2021-03-25 RX ADMIN — SERTRALINE HYDROCHLORIDE 200 MG: 50 TABLET ORAL at 09:03

## 2021-03-25 RX ADMIN — AMOXICILLIN AND CLAVULANATE POTASSIUM 1 TABLET: 875; 125 TABLET, FILM COATED ORAL at 09:03

## 2021-03-31 ENCOUNTER — PATIENT OUTREACH (OUTPATIENT)
Dept: ADMINISTRATIVE | Facility: OTHER | Age: 61
End: 2021-03-31

## 2021-04-05 ENCOUNTER — TELEPHONE (OUTPATIENT)
Dept: RHEUMATOLOGY | Facility: CLINIC | Age: 61
End: 2021-04-05

## 2021-04-06 ENCOUNTER — PATIENT MESSAGE (OUTPATIENT)
Dept: ADMINISTRATIVE | Facility: HOSPITAL | Age: 61
End: 2021-04-06

## 2021-04-19 ENCOUNTER — TELEPHONE (OUTPATIENT)
Dept: FAMILY MEDICINE | Facility: CLINIC | Age: 61
End: 2021-04-19

## 2021-04-19 DIAGNOSIS — I77.6 CNS VASCULITIS: ICD-10-CM

## 2021-04-19 DIAGNOSIS — F03.91 DEMENTIA WITH BEHAVIORAL DISTURBANCE, UNSPECIFIED DEMENTIA TYPE: ICD-10-CM

## 2021-04-19 DIAGNOSIS — G47.33 OSA ON CPAP: ICD-10-CM

## 2021-04-19 DIAGNOSIS — R41.89 COGNITIVE IMPAIRMENT: Primary | ICD-10-CM

## 2021-05-31 ENCOUNTER — PATIENT MESSAGE (OUTPATIENT)
Dept: PSYCHIATRY | Facility: CLINIC | Age: 61
End: 2021-05-31

## 2021-05-31 ENCOUNTER — TELEPHONE (OUTPATIENT)
Dept: PSYCHIATRY | Facility: CLINIC | Age: 61
End: 2021-05-31

## 2021-06-02 ENCOUNTER — TELEPHONE (OUTPATIENT)
Dept: RHEUMATOLOGY | Facility: CLINIC | Age: 61
End: 2021-06-02

## 2021-06-25 ENCOUNTER — PATIENT OUTREACH (OUTPATIENT)
Dept: ADMINISTRATIVE | Facility: OTHER | Age: 61
End: 2021-06-25

## 2021-07-07 ENCOUNTER — PATIENT MESSAGE (OUTPATIENT)
Dept: ADMINISTRATIVE | Facility: HOSPITAL | Age: 61
End: 2021-07-07

## 2021-07-14 ENCOUNTER — PATIENT MESSAGE (OUTPATIENT)
Dept: ADMINISTRATIVE | Facility: HOSPITAL | Age: 61
End: 2021-07-14

## 2021-08-04 DIAGNOSIS — E11.9 TYPE 2 DIABETES MELLITUS WITHOUT COMPLICATION: ICD-10-CM

## 2021-08-16 ENCOUNTER — TELEPHONE (OUTPATIENT)
Dept: NEUROLOGY | Facility: CLINIC | Age: 61
End: 2021-08-16

## 2021-08-22 PROBLEM — S42.352A CLOSED DISPLACED COMMINUTED FRACTURE OF SHAFT OF LEFT HUMERUS: Status: ACTIVE | Noted: 2021-08-22

## 2021-08-24 ENCOUNTER — TELEPHONE (OUTPATIENT)
Dept: FAMILY MEDICINE | Facility: CLINIC | Age: 61
End: 2021-08-24

## 2021-08-27 ENCOUNTER — HOSPITAL ENCOUNTER (OUTPATIENT)
Facility: HOSPITAL | Age: 61
Discharge: NURSING, REHAB OR LONG-TERM CARE | End: 2021-09-15
Attending: EMERGENCY MEDICINE | Admitting: EMERGENCY MEDICINE
Payer: MEDICARE

## 2021-08-27 DIAGNOSIS — E11.21 CONTROLLED TYPE 2 DIABETES MELLITUS WITH DIABETIC NEPHROPATHY, WITH LONG-TERM CURRENT USE OF INSULIN: ICD-10-CM

## 2021-08-27 DIAGNOSIS — F03.91 DEMENTIA WITH BEHAVIORAL DISTURBANCE, UNSPECIFIED DEMENTIA TYPE: ICD-10-CM

## 2021-08-27 DIAGNOSIS — I10 ESSENTIAL HYPERTENSION: ICD-10-CM

## 2021-08-27 DIAGNOSIS — E78.1 HYPERTRIGLYCERIDEMIA: ICD-10-CM

## 2021-08-27 DIAGNOSIS — R39.81 URINARY INCONTINENCE DUE TO COGNITIVE IMPAIRMENT: ICD-10-CM

## 2021-08-27 DIAGNOSIS — S42.352A CLOSED DISPLACED COMMINUTED FRACTURE OF SHAFT OF LEFT HUMERUS, INITIAL ENCOUNTER: ICD-10-CM

## 2021-08-27 DIAGNOSIS — R53.81 DEBILITY: Chronic | ICD-10-CM

## 2021-08-27 DIAGNOSIS — R53.1 WEAKNESS: Primary | ICD-10-CM

## 2021-08-27 DIAGNOSIS — Z75.8 DISCHARGE PLANNING ISSUES: ICD-10-CM
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
## 2021-08-27 DIAGNOSIS — F32.5 MAJOR DEPRESSIVE DISORDER WITH SINGLE EPISODE, IN FULL REMISSION: Chronic | ICD-10-CM

## 2021-08-27 DIAGNOSIS — Z79.4 CONTROLLED TYPE 2 DIABETES MELLITUS WITH DIABETIC NEPHROPATHY, WITH LONG-TERM CURRENT USE OF INSULIN: ICD-10-CM

## 2021-08-27 DIAGNOSIS — E78.2 MIXED HYPERLIPIDEMIA: ICD-10-CM

## 2021-08-27 LAB
ALBUMIN SERPL BCP-MCNC: 3.4 G/DL (ref 3.5–5.2)
ALP SERPL-CCNC: 136 U/L (ref 55–135)
ALT SERPL W/O P-5'-P-CCNC: 18 U/L (ref 10–44)
ANION GAP SERPL CALC-SCNC: 14 MMOL/L (ref 8–16)
AST SERPL-CCNC: 23 U/L (ref 10–40)
BACTERIA #/AREA URNS AUTO: ABNORMAL /HPF
BASOPHILS # BLD AUTO: 0.04 K/UL (ref 0–0.2)
BASOPHILS NFR BLD: 0.4 % (ref 0–1.9)
BILIRUB SERPL-MCNC: 1.1 MG/DL (ref 0.1–1)
BILIRUB UR QL STRIP: NEGATIVE
BNP SERPL-MCNC: 69 PG/ML (ref 0–99)
BUN SERPL-MCNC: 22 MG/DL (ref 8–23)
CALCIUM SERPL-MCNC: 9.1 MG/DL (ref 8.7–10.5)
CAOX CRY UR QL COMP ASSIST: ABNORMAL
CHLORIDE SERPL-SCNC: 107 MMOL/L (ref 95–110)
CLARITY UR REFRACT.AUTO: ABNORMAL
CO2 SERPL-SCNC: 20 MMOL/L (ref 23–29)
COLOR UR AUTO: YELLOW
CREAT SERPL-MCNC: 1.2 MG/DL (ref 0.5–1.4)
CTP QC/QA: YES
DIFFERENTIAL METHOD: ABNORMAL
EOSINOPHIL # BLD AUTO: 0.1 K/UL (ref 0–0.5)
EOSINOPHIL NFR BLD: 0.8 % (ref 0–8)
ERYTHROCYTE [DISTWIDTH] IN BLOOD BY AUTOMATED COUNT: 15.5 % (ref 11.5–14.5)
EST. GFR  (AFRICAN AMERICAN): >60 ML/MIN/1.73 M^2
EST. GFR  (NON AFRICAN AMERICAN): >60 ML/MIN/1.73 M^2
GLUCOSE SERPL-MCNC: 171 MG/DL (ref 70–110)
GLUCOSE UR QL STRIP: NEGATIVE
HCT VFR BLD AUTO: 30.1 % (ref 40–54)
HGB BLD-MCNC: 9.7 G/DL (ref 14–18)
HGB UR QL STRIP: ABNORMAL
HYALINE CASTS UR QL AUTO: 20 /LPF
IMM GRANULOCYTES # BLD AUTO: 0.11 K/UL (ref 0–0.04)
IMM GRANULOCYTES NFR BLD AUTO: 1.2 % (ref 0–0.5)
KETONES UR QL STRIP: NEGATIVE
LEUKOCYTE ESTERASE UR QL STRIP: NEGATIVE
LYMPHOCYTES # BLD AUTO: 0.7 K/UL (ref 1–4.8)
LYMPHOCYTES NFR BLD: 7.2 % (ref 18–48)
MCH RBC QN AUTO: 29.4 PG (ref 27–31)
MCHC RBC AUTO-ENTMCNC: 32.2 G/DL (ref 32–36)
MCV RBC AUTO: 91 FL (ref 82–98)
MICROSCOPIC COMMENT: ABNORMAL
MONOCYTES # BLD AUTO: 0.7 K/UL (ref 0.3–1)
MONOCYTES NFR BLD: 7.6 % (ref 4–15)
NEUTROPHILS # BLD AUTO: 7.4 K/UL (ref 1.8–7.7)
NEUTROPHILS NFR BLD: 82.8 % (ref 38–73)
NITRITE UR QL STRIP: NEGATIVE
NRBC BLD-RTO: 0 /100 WBC
PH UR STRIP: 5 [PH] (ref 5–8)
PLATELET # BLD AUTO: 293 K/UL (ref 150–450)
PMV BLD AUTO: 9.6 FL (ref 9.2–12.9)
POTASSIUM SERPL-SCNC: 4.3 MMOL/L (ref 3.5–5.1)
PROT SERPL-MCNC: 6.5 G/DL (ref 6–8.4)
PROT UR QL STRIP: ABNORMAL
RBC # BLD AUTO: 3.3 M/UL (ref 4.6–6.2)
RBC #/AREA URNS AUTO: 2 /HPF (ref 0–4)
SARS-COV-2 RDRP RESP QL NAA+PROBE: NEGATIVE
SODIUM SERPL-SCNC: 141 MMOL/L (ref 136–145)
SP GR UR STRIP: 1.01 (ref 1–1.03)
SQUAMOUS #/AREA URNS AUTO: 0 /HPF
TROPONIN I SERPL DL<=0.01 NG/ML-MCNC: 0.01 NG/ML (ref 0–0.03)
URN SPEC COLLECT METH UR: ABNORMAL
WBC # BLD AUTO: 8.98 K/UL (ref 3.9–12.7)
WBC #/AREA URNS AUTO: 8 /HPF (ref 0–5)

## 2021-08-27 PROCEDURE — G0378 HOSPITAL OBSERVATION PER HR: HCPCS

## 2021-08-27 PROCEDURE — 83880 ASSAY OF NATRIURETIC PEPTIDE: CPT | Performed by: EMERGENCY MEDICINE

## 2021-08-27 PROCEDURE — 84484 ASSAY OF TROPONIN QUANT: CPT | Performed by: EMERGENCY MEDICINE

## 2021-08-27 PROCEDURE — 99285 EMERGENCY DEPT VISIT HI MDM: CPT | Mod: 25

## 2021-08-27 PROCEDURE — 99285 EMERGENCY DEPT VISIT HI MDM: CPT | Mod: CS,,, | Performed by: EMERGENCY MEDICINE

## 2021-08-27 PROCEDURE — 80053 COMPREHEN METABOLIC PANEL: CPT | Performed by: EMERGENCY MEDICINE

## 2021-08-27 PROCEDURE — 93010 ELECTROCARDIOGRAM REPORT: CPT | Mod: ,,, | Performed by: INTERNAL MEDICINE

## 2021-08-27 PROCEDURE — 99220 PR INITIAL OBSERVATION CARE,LEVL III: CPT | Mod: ,,, | Performed by: PHYSICIAN ASSISTANT

## 2021-08-27 PROCEDURE — U0002 COVID-19 LAB TEST NON-CDC: HCPCS | Performed by: EMERGENCY MEDICINE

## 2021-08-27 PROCEDURE — 93005 ELECTROCARDIOGRAM TRACING: CPT

## 2021-08-27 PROCEDURE — 99285 PR EMERGENCY DEPT VISIT,LEVEL V: ICD-10-PCS | Mod: CS,,, | Performed by: EMERGENCY MEDICINE

## 2021-08-27 PROCEDURE — 99220 PR INITIAL OBSERVATION CARE,LEVL III: ICD-10-PCS | Mod: ,,, | Performed by: PHYSICIAN ASSISTANT

## 2021-08-27 PROCEDURE — 93010 EKG 12-LEAD: ICD-10-PCS | Mod: ,,, | Performed by: INTERNAL MEDICINE

## 2021-08-27 PROCEDURE — 81001 URINALYSIS AUTO W/SCOPE: CPT | Performed by: EMERGENCY MEDICINE

## 2021-08-27 PROCEDURE — 85025 COMPLETE CBC W/AUTO DIFF WBC: CPT | Performed by: EMERGENCY MEDICINE

## 2021-08-28 PROBLEM — R29.6 FREQUENT FALLS: Chronic | Status: ACTIVE | Noted: 2021-08-27

## 2021-08-28 PROBLEM — Z86.73 HISTORY OF STROKE: Chronic | Status: ACTIVE | Noted: 2017-09-14

## 2021-08-28 LAB
ALBUMIN SERPL BCP-MCNC: 3.1 G/DL (ref 3.5–5.2)
ALP SERPL-CCNC: 130 U/L (ref 55–135)
ALT SERPL W/O P-5'-P-CCNC: 17 U/L (ref 10–44)
ANION GAP SERPL CALC-SCNC: 11 MMOL/L (ref 8–16)
AST SERPL-CCNC: 22 U/L (ref 10–40)
BASOPHILS # BLD AUTO: 0.03 K/UL (ref 0–0.2)
BASOPHILS NFR BLD: 0.4 % (ref 0–1.9)
BILIRUB SERPL-MCNC: 0.9 MG/DL (ref 0.1–1)
BUN SERPL-MCNC: 22 MG/DL (ref 8–23)
CALCIUM SERPL-MCNC: 9.1 MG/DL (ref 8.7–10.5)
CHLORIDE SERPL-SCNC: 107 MMOL/L (ref 95–110)
CO2 SERPL-SCNC: 24 MMOL/L (ref 23–29)
CREAT SERPL-MCNC: 1.2 MG/DL (ref 0.5–1.4)
DIFFERENTIAL METHOD: ABNORMAL
EOSINOPHIL # BLD AUTO: 0.1 K/UL (ref 0–0.5)
EOSINOPHIL NFR BLD: 1.8 % (ref 0–8)
ERYTHROCYTE [DISTWIDTH] IN BLOOD BY AUTOMATED COUNT: 15.4 % (ref 11.5–14.5)
EST. GFR  (AFRICAN AMERICAN): >60 ML/MIN/1.73 M^2
EST. GFR  (NON AFRICAN AMERICAN): >60 ML/MIN/1.73 M^2
ESTIMATED AVG GLUCOSE: 105 MG/DL (ref 68–131)
GLUCOSE SERPL-MCNC: 109 MG/DL (ref 70–110)
GLUCOSE SERPL-MCNC: 119 MG/DL (ref 70–110)
HBA1C MFR BLD: 5.3 % (ref 4–5.6)
HCT VFR BLD AUTO: 26.4 % (ref 40–54)
HGB BLD-MCNC: 9.1 G/DL (ref 14–18)
IMM GRANULOCYTES # BLD AUTO: 0.08 K/UL (ref 0–0.04)
IMM GRANULOCYTES NFR BLD AUTO: 1.1 % (ref 0–0.5)
LYMPHOCYTES # BLD AUTO: 1.3 K/UL (ref 1–4.8)
LYMPHOCYTES NFR BLD: 18.3 % (ref 18–48)
MAGNESIUM SERPL-MCNC: 1.9 MG/DL (ref 1.6–2.6)
MCH RBC QN AUTO: 30.3 PG (ref 27–31)
MCHC RBC AUTO-ENTMCNC: 34.5 G/DL (ref 32–36)
MCV RBC AUTO: 88 FL (ref 82–98)
MONOCYTES # BLD AUTO: 0.6 K/UL (ref 0.3–1)
MONOCYTES NFR BLD: 8.4 % (ref 4–15)
NEUTROPHILS # BLD AUTO: 4.9 K/UL (ref 1.8–7.7)
NEUTROPHILS NFR BLD: 70 % (ref 38–73)
NRBC BLD-RTO: 0 /100 WBC
PHOSPHATE SERPL-MCNC: 3 MG/DL (ref 2.7–4.5)
PLATELET # BLD AUTO: 229 K/UL (ref 150–450)
PMV BLD AUTO: 9.1 FL (ref 9.2–12.9)
POCT GLUCOSE: 125 MG/DL (ref 70–110)
POCT GLUCOSE: 126 MG/DL (ref 70–110)
POCT GLUCOSE: 136 MG/DL (ref 70–110)
POCT GLUCOSE: 163 MG/DL (ref 70–110)
POTASSIUM SERPL-SCNC: 4.5 MMOL/L (ref 3.5–5.1)
PROT SERPL-MCNC: 6 G/DL (ref 6–8.4)
RBC # BLD AUTO: 3 M/UL (ref 4.6–6.2)
SODIUM SERPL-SCNC: 142 MMOL/L (ref 136–145)
WBC # BLD AUTO: 7.05 K/UL (ref 3.9–12.7)

## 2021-08-28 PROCEDURE — 99226 PR SUBSEQUENT OBSERVATION CARE,LEVEL III: CPT | Mod: ,,, | Performed by: PHYSICIAN ASSISTANT

## 2021-08-28 PROCEDURE — 83735 ASSAY OF MAGNESIUM: CPT | Performed by: PHYSICIAN ASSISTANT

## 2021-08-28 PROCEDURE — 80053 COMPREHEN METABOLIC PANEL: CPT | Performed by: PHYSICIAN ASSISTANT

## 2021-08-28 PROCEDURE — 96372 THER/PROPH/DIAG INJ SC/IM: CPT | Mod: 59

## 2021-08-28 PROCEDURE — G0378 HOSPITAL OBSERVATION PER HR: HCPCS

## 2021-08-28 PROCEDURE — 99226 PR SUBSEQUENT OBSERVATION CARE,LEVEL III: ICD-10-PCS | Mod: ,,, | Performed by: PHYSICIAN ASSISTANT

## 2021-08-28 PROCEDURE — 82962 GLUCOSE BLOOD TEST: CPT

## 2021-08-28 PROCEDURE — 25000003 PHARM REV CODE 250: Performed by: PHYSICIAN ASSISTANT

## 2021-08-28 PROCEDURE — 84100 ASSAY OF PHOSPHORUS: CPT | Performed by: PHYSICIAN ASSISTANT

## 2021-08-28 PROCEDURE — 36415 COLL VENOUS BLD VENIPUNCTURE: CPT | Performed by: PHYSICIAN ASSISTANT

## 2021-08-28 PROCEDURE — 85025 COMPLETE CBC W/AUTO DIFF WBC: CPT | Performed by: PHYSICIAN ASSISTANT

## 2021-08-28 PROCEDURE — C9399 UNCLASSIFIED DRUGS OR BIOLOG: HCPCS | Performed by: PHYSICIAN ASSISTANT

## 2021-08-28 PROCEDURE — 83036 HEMOGLOBIN GLYCOSYLATED A1C: CPT | Performed by: PHYSICIAN ASSISTANT

## 2021-08-28 PROCEDURE — 63600175 PHARM REV CODE 636 W HCPCS: Performed by: PHYSICIAN ASSISTANT

## 2021-08-28 RX ORDER — L. ACIDOPHILUS/L.BULGARICUS 100MM CELL
1 GRANULES IN PACKET (EA) ORAL 2 TIMES DAILY
Status: DISCONTINUED | OUTPATIENT
Start: 2021-08-28 | End: 2021-09-15 | Stop reason: HOSPADM

## 2021-08-28 RX ORDER — ATENOLOL 50 MG/1
50 TABLET ORAL DAILY
Status: DISCONTINUED | OUTPATIENT
Start: 2021-08-28 | End: 2021-09-04

## 2021-08-28 RX ORDER — PANTOPRAZOLE SODIUM 40 MG/1
40 TABLET, DELAYED RELEASE ORAL 2 TIMES DAILY
Status: DISCONTINUED | OUTPATIENT
Start: 2021-08-28 | End: 2021-09-15 | Stop reason: HOSPADM

## 2021-08-28 RX ORDER — OXYBUTYNIN CHLORIDE 5 MG/1
5 TABLET, EXTENDED RELEASE ORAL DAILY
Status: DISCONTINUED | OUTPATIENT
Start: 2021-08-28 | End: 2021-09-15 | Stop reason: HOSPADM

## 2021-08-28 RX ORDER — PROMETHAZINE HYDROCHLORIDE 25 MG/1
25 TABLET ORAL EVERY 6 HOURS PRN
Status: DISCONTINUED | OUTPATIENT
Start: 2021-08-28 | End: 2021-09-15 | Stop reason: HOSPADM

## 2021-08-28 RX ORDER — TALC
6 POWDER (GRAM) TOPICAL NIGHTLY PRN
Status: DISCONTINUED | OUTPATIENT
Start: 2021-08-28 | End: 2021-09-15 | Stop reason: HOSPADM

## 2021-08-28 RX ORDER — INSULIN ASPART 100 [IU]/ML
4 INJECTION, SOLUTION INTRAVENOUS; SUBCUTANEOUS
Status: DISCONTINUED | OUTPATIENT
Start: 2021-08-28 | End: 2021-09-07

## 2021-08-28 RX ORDER — OMEGA-3/DHA/EPA/FISH OIL 300-1000MG
1 CAPSULE,DELAYED RELEASE (ENTERIC COATED) ORAL DAILY
Status: DISCONTINUED | OUTPATIENT
Start: 2021-08-28 | End: 2021-09-15 | Stop reason: HOSPADM

## 2021-08-28 RX ORDER — IPRATROPIUM BROMIDE AND ALBUTEROL SULFATE 2.5; .5 MG/3ML; MG/3ML
3 SOLUTION RESPIRATORY (INHALATION) EVERY 4 HOURS PRN
Status: DISCONTINUED | OUTPATIENT
Start: 2021-08-28 | End: 2021-09-08

## 2021-08-28 RX ORDER — ONDANSETRON 8 MG/1
8 TABLET, ORALLY DISINTEGRATING ORAL EVERY 8 HOURS PRN
Status: DISCONTINUED | OUTPATIENT
Start: 2021-08-28 | End: 2021-09-15 | Stop reason: HOSPADM

## 2021-08-28 RX ORDER — INSULIN ASPART 100 [IU]/ML
1-10 INJECTION, SOLUTION INTRAVENOUS; SUBCUTANEOUS
Status: DISCONTINUED | OUTPATIENT
Start: 2021-08-28 | End: 2021-09-15 | Stop reason: HOSPADM

## 2021-08-28 RX ORDER — GLUCAGON 1 MG
1 KIT INJECTION
Status: DISCONTINUED | OUTPATIENT
Start: 2021-08-28 | End: 2021-09-15 | Stop reason: HOSPADM

## 2021-08-28 RX ORDER — AMOXICILLIN 250 MG
1 CAPSULE ORAL 2 TIMES DAILY
Status: DISCONTINUED | OUTPATIENT
Start: 2021-08-28 | End: 2021-09-10

## 2021-08-28 RX ORDER — IBUPROFEN 200 MG
16 TABLET ORAL
Status: DISCONTINUED | OUTPATIENT
Start: 2021-08-28 | End: 2021-09-15 | Stop reason: HOSPADM

## 2021-08-28 RX ORDER — ATORVASTATIN CALCIUM 20 MG/1
40 TABLET, FILM COATED ORAL DAILY
Status: DISCONTINUED | OUTPATIENT
Start: 2021-08-28 | End: 2021-09-15 | Stop reason: HOSPADM

## 2021-08-28 RX ORDER — DONEPEZIL HYDROCHLORIDE 5 MG/1
5 TABLET, FILM COATED ORAL NIGHTLY
Status: DISCONTINUED | OUTPATIENT
Start: 2021-08-28 | End: 2021-09-15 | Stop reason: HOSPADM

## 2021-08-28 RX ORDER — MAG HYDROX/ALUMINUM HYD/SIMETH 200-200-20
30 SUSPENSION, ORAL (FINAL DOSE FORM) ORAL EVERY 4 HOURS PRN
Status: DISCONTINUED | OUTPATIENT
Start: 2021-08-28 | End: 2021-09-15 | Stop reason: HOSPADM

## 2021-08-28 RX ORDER — CHOLECALCIFEROL (VITAMIN D3) 25 MCG
5000 TABLET ORAL DAILY
Status: DISCONTINUED | OUTPATIENT
Start: 2021-08-28 | End: 2021-09-02

## 2021-08-28 RX ORDER — LOSARTAN POTASSIUM 50 MG/1
100 TABLET ORAL DAILY
Refills: 3 | Status: DISCONTINUED | OUTPATIENT
Start: 2021-08-28 | End: 2021-09-15 | Stop reason: HOSPADM

## 2021-08-28 RX ORDER — ACETAMINOPHEN 325 MG/1
650 TABLET ORAL EVERY 4 HOURS PRN
Status: DISCONTINUED | OUTPATIENT
Start: 2021-08-28 | End: 2021-09-05

## 2021-08-28 RX ORDER — HYDROCODONE BITARTRATE AND ACETAMINOPHEN 5; 325 MG/1; MG/1
1 TABLET ORAL EVERY 6 HOURS PRN
Status: DISCONTINUED | OUTPATIENT
Start: 2021-08-28 | End: 2021-09-05

## 2021-08-28 RX ORDER — SERTRALINE HYDROCHLORIDE 100 MG/1
200 TABLET, FILM COATED ORAL DAILY
Status: DISCONTINUED | OUTPATIENT
Start: 2021-08-28 | End: 2021-09-15 | Stop reason: HOSPADM

## 2021-08-28 RX ORDER — SODIUM CHLORIDE 0.9 % (FLUSH) 0.9 %
5 SYRINGE (ML) INJECTION
Status: DISCONTINUED | OUTPATIENT
Start: 2021-08-28 | End: 2021-09-15 | Stop reason: HOSPADM

## 2021-08-28 RX ORDER — QUETIAPINE FUMARATE 25 MG/1
25 TABLET, FILM COATED ORAL NIGHTLY PRN
Status: DISCONTINUED | OUTPATIENT
Start: 2021-08-28 | End: 2021-09-15 | Stop reason: HOSPADM

## 2021-08-28 RX ORDER — IBUPROFEN 200 MG
24 TABLET ORAL
Status: DISCONTINUED | OUTPATIENT
Start: 2021-08-28 | End: 2021-09-15 | Stop reason: HOSPADM

## 2021-08-28 RX ADMIN — SENNOSIDES AND DOCUSATE SODIUM 1 TABLET: 8.6; 5 TABLET ORAL at 02:08

## 2021-08-28 RX ADMIN — ATORVASTATIN CALCIUM 40 MG: 20 TABLET, FILM COATED ORAL at 08:08

## 2021-08-28 RX ADMIN — LACTOBACILLUS ACIDOPHILUS / LACTOBACILLUS BULGARICUS 1 EACH: 100 MILLION CFU STRENGTH GRANULES at 09:08

## 2021-08-28 RX ADMIN — INSULIN DETEMIR 10 UNITS: 100 INJECTION, SOLUTION SUBCUTANEOUS at 02:08

## 2021-08-28 RX ADMIN — Medication 1 CAPSULE: at 09:08

## 2021-08-28 RX ADMIN — PANTOPRAZOLE SODIUM 40 MG: 40 TABLET, DELAYED RELEASE ORAL at 08:08

## 2021-08-28 RX ADMIN — SERTRALINE HYDROCHLORIDE 200 MG: 100 TABLET ORAL at 08:08

## 2021-08-28 RX ADMIN — INSULIN DETEMIR 10 UNITS: 100 INJECTION, SOLUTION SUBCUTANEOUS at 09:08

## 2021-08-28 RX ADMIN — DONEPEZIL HYDROCHLORIDE 5 MG: 5 TABLET, FILM COATED ORAL at 09:08

## 2021-08-28 RX ADMIN — SENNOSIDES AND DOCUSATE SODIUM 1 TABLET: 8.6; 5 TABLET ORAL at 08:08

## 2021-08-28 RX ADMIN — Medication 5000 UNITS: at 08:08

## 2021-08-28 RX ADMIN — INSULIN ASPART 1 UNITS: 100 INJECTION, SOLUTION INTRAVENOUS; SUBCUTANEOUS at 09:08

## 2021-08-28 RX ADMIN — OXYBUTYNIN CHLORIDE 5 MG: 5 TABLET, EXTENDED RELEASE ORAL at 08:08

## 2021-08-28 RX ADMIN — LOSARTAN POTASSIUM 100 MG: 50 TABLET, FILM COATED ORAL at 08:08

## 2021-08-28 RX ADMIN — INSULIN ASPART 4 UNITS: 100 INJECTION, SOLUTION INTRAVENOUS; SUBCUTANEOUS at 12:08

## 2021-08-28 RX ADMIN — ATENOLOL 50 MG: 50 TABLET ORAL at 08:08

## 2021-08-28 RX ADMIN — SENNOSIDES AND DOCUSATE SODIUM 1 TABLET: 8.6; 5 TABLET ORAL at 09:08

## 2021-08-28 RX ADMIN — INSULIN ASPART 4 UNITS: 100 INJECTION, SOLUTION INTRAVENOUS; SUBCUTANEOUS at 05:08

## 2021-08-28 RX ADMIN — DONEPEZIL HYDROCHLORIDE 5 MG: 5 TABLET, FILM COATED ORAL at 02:08

## 2021-08-28 RX ADMIN — PANTOPRAZOLE SODIUM 40 MG: 40 TABLET, DELAYED RELEASE ORAL at 09:08

## 2021-08-28 RX ADMIN — INSULIN ASPART 4 UNITS: 100 INJECTION, SOLUTION INTRAVENOUS; SUBCUTANEOUS at 08:08

## 2021-08-29 LAB
ALBUMIN SERPL BCP-MCNC: 3.2 G/DL (ref 3.5–5.2)
ALP SERPL-CCNC: 138 U/L (ref 55–135)
ALT SERPL W/O P-5'-P-CCNC: 19 U/L (ref 10–44)
ANION GAP SERPL CALC-SCNC: 9 MMOL/L (ref 8–16)
AST SERPL-CCNC: 23 U/L (ref 10–40)
BASOPHILS # BLD AUTO: 0.03 K/UL (ref 0–0.2)
BASOPHILS NFR BLD: 0.4 % (ref 0–1.9)
BILIRUB SERPL-MCNC: 1 MG/DL (ref 0.1–1)
BUN SERPL-MCNC: 19 MG/DL (ref 8–23)
CALCIUM SERPL-MCNC: 8.7 MG/DL (ref 8.7–10.5)
CHLORIDE SERPL-SCNC: 107 MMOL/L (ref 95–110)
CO2 SERPL-SCNC: 21 MMOL/L (ref 23–29)
CREAT SERPL-MCNC: 1 MG/DL (ref 0.5–1.4)
DIFFERENTIAL METHOD: ABNORMAL
EOSINOPHIL # BLD AUTO: 0.1 K/UL (ref 0–0.5)
EOSINOPHIL NFR BLD: 1.8 % (ref 0–8)
ERYTHROCYTE [DISTWIDTH] IN BLOOD BY AUTOMATED COUNT: 15.3 % (ref 11.5–14.5)
EST. GFR  (AFRICAN AMERICAN): >60 ML/MIN/1.73 M^2
EST. GFR  (NON AFRICAN AMERICAN): >60 ML/MIN/1.73 M^2
GLUCOSE SERPL-MCNC: 121 MG/DL (ref 70–110)
HCT VFR BLD AUTO: 29.1 % (ref 40–54)
HGB BLD-MCNC: 9.7 G/DL (ref 14–18)
IMM GRANULOCYTES # BLD AUTO: 0.07 K/UL (ref 0–0.04)
IMM GRANULOCYTES NFR BLD AUTO: 1 % (ref 0–0.5)
LYMPHOCYTES # BLD AUTO: 1.3 K/UL (ref 1–4.8)
LYMPHOCYTES NFR BLD: 17.7 % (ref 18–48)
MAGNESIUM SERPL-MCNC: 1.9 MG/DL (ref 1.6–2.6)
MCH RBC QN AUTO: 29.5 PG (ref 27–31)
MCHC RBC AUTO-ENTMCNC: 33.3 G/DL (ref 32–36)
MCV RBC AUTO: 88 FL (ref 82–98)
MONOCYTES # BLD AUTO: 0.6 K/UL (ref 0.3–1)
MONOCYTES NFR BLD: 8.6 % (ref 4–15)
NEUTROPHILS # BLD AUTO: 5 K/UL (ref 1.8–7.7)
NEUTROPHILS NFR BLD: 70.5 % (ref 38–73)
NRBC BLD-RTO: 0 /100 WBC
PLATELET # BLD AUTO: 225 K/UL (ref 150–450)
PMV BLD AUTO: 9.2 FL (ref 9.2–12.9)
POCT GLUCOSE: 113 MG/DL (ref 70–110)
POCT GLUCOSE: 119 MG/DL (ref 70–110)
POCT GLUCOSE: 126 MG/DL (ref 70–110)
POCT GLUCOSE: 142 MG/DL (ref 70–110)
POCT GLUCOSE: 99 MG/DL (ref 70–110)
POTASSIUM SERPL-SCNC: 3.9 MMOL/L (ref 3.5–5.1)
PROT SERPL-MCNC: 6.3 G/DL (ref 6–8.4)
RBC # BLD AUTO: 3.29 M/UL (ref 4.6–6.2)
SODIUM SERPL-SCNC: 137 MMOL/L (ref 136–145)
WBC # BLD AUTO: 7.08 K/UL (ref 3.9–12.7)

## 2021-08-29 PROCEDURE — 99225 PR SUBSEQUENT OBSERVATION CARE,LEVEL II: CPT | Mod: ,,, | Performed by: PHYSICIAN ASSISTANT

## 2021-08-29 PROCEDURE — 97530 THERAPEUTIC ACTIVITIES: CPT

## 2021-08-29 PROCEDURE — G0378 HOSPITAL OBSERVATION PER HR: HCPCS

## 2021-08-29 PROCEDURE — 97166 OT EVAL MOD COMPLEX 45 MIN: CPT

## 2021-08-29 PROCEDURE — 97162 PT EVAL MOD COMPLEX 30 MIN: CPT

## 2021-08-29 PROCEDURE — 25000003 PHARM REV CODE 250: Performed by: PHYSICIAN ASSISTANT

## 2021-08-29 PROCEDURE — 63600175 PHARM REV CODE 636 W HCPCS: Performed by: PHYSICIAN ASSISTANT

## 2021-08-29 PROCEDURE — 97535 SELF CARE MNGMENT TRAINING: CPT

## 2021-08-29 PROCEDURE — 36415 COLL VENOUS BLD VENIPUNCTURE: CPT | Performed by: PHYSICIAN ASSISTANT

## 2021-08-29 PROCEDURE — 99225 PR SUBSEQUENT OBSERVATION CARE,LEVEL II: ICD-10-PCS | Mod: ,,, | Performed by: PHYSICIAN ASSISTANT

## 2021-08-29 PROCEDURE — 96372 THER/PROPH/DIAG INJ SC/IM: CPT | Mod: 59

## 2021-08-29 PROCEDURE — 97116 GAIT TRAINING THERAPY: CPT

## 2021-08-29 PROCEDURE — 85025 COMPLETE CBC W/AUTO DIFF WBC: CPT | Performed by: PHYSICIAN ASSISTANT

## 2021-08-29 PROCEDURE — 83735 ASSAY OF MAGNESIUM: CPT | Performed by: PHYSICIAN ASSISTANT

## 2021-08-29 PROCEDURE — 80053 COMPREHEN METABOLIC PANEL: CPT | Performed by: PHYSICIAN ASSISTANT

## 2021-08-29 RX ADMIN — SENNOSIDES AND DOCUSATE SODIUM 1 TABLET: 8.6; 5 TABLET ORAL at 08:08

## 2021-08-29 RX ADMIN — OXYBUTYNIN CHLORIDE 5 MG: 5 TABLET, EXTENDED RELEASE ORAL at 08:08

## 2021-08-29 RX ADMIN — SERTRALINE HYDROCHLORIDE 200 MG: 100 TABLET ORAL at 08:08

## 2021-08-29 RX ADMIN — PANTOPRAZOLE SODIUM 40 MG: 40 TABLET, DELAYED RELEASE ORAL at 10:08

## 2021-08-29 RX ADMIN — Medication 5000 UNITS: at 08:08

## 2021-08-29 RX ADMIN — PANTOPRAZOLE SODIUM 40 MG: 40 TABLET, DELAYED RELEASE ORAL at 09:08

## 2021-08-29 RX ADMIN — SENNOSIDES AND DOCUSATE SODIUM 1 TABLET: 8.6; 5 TABLET ORAL at 09:08

## 2021-08-29 RX ADMIN — INSULIN DETEMIR 10 UNITS: 100 INJECTION, SOLUTION SUBCUTANEOUS at 09:08

## 2021-08-29 RX ADMIN — INSULIN ASPART 4 UNITS: 100 INJECTION, SOLUTION INTRAVENOUS; SUBCUTANEOUS at 09:08

## 2021-08-29 RX ADMIN — Medication 1 CAPSULE: at 08:08

## 2021-08-29 RX ADMIN — ATENOLOL 50 MG: 50 TABLET ORAL at 08:08

## 2021-08-29 RX ADMIN — INSULIN ASPART 4 UNITS: 100 INJECTION, SOLUTION INTRAVENOUS; SUBCUTANEOUS at 12:08

## 2021-08-29 RX ADMIN — LACTOBACILLUS ACIDOPHILUS / LACTOBACILLUS BULGARICUS 1 EACH: 100 MILLION CFU STRENGTH GRANULES at 09:08

## 2021-08-29 RX ADMIN — ATORVASTATIN CALCIUM 40 MG: 20 TABLET, FILM COATED ORAL at 08:08

## 2021-08-29 RX ADMIN — LACTOBACILLUS ACIDOPHILUS / LACTOBACILLUS BULGARICUS 1 EACH: 100 MILLION CFU STRENGTH GRANULES at 08:08

## 2021-08-29 RX ADMIN — DONEPEZIL HYDROCHLORIDE 5 MG: 5 TABLET, FILM COATED ORAL at 09:08

## 2021-08-29 RX ADMIN — LOSARTAN POTASSIUM 100 MG: 50 TABLET, FILM COATED ORAL at 08:08

## 2021-08-29 RX ADMIN — INSULIN ASPART 4 UNITS: 100 INJECTION, SOLUTION INTRAVENOUS; SUBCUTANEOUS at 05:08

## 2021-08-30 LAB
ALBUMIN SERPL BCP-MCNC: 3.1 G/DL (ref 3.5–5.2)
ALP SERPL-CCNC: 136 U/L (ref 55–135)
ALT SERPL W/O P-5'-P-CCNC: 17 U/L (ref 10–44)
ANION GAP SERPL CALC-SCNC: 10 MMOL/L (ref 8–16)
AST SERPL-CCNC: 23 U/L (ref 10–40)
BASOPHILS # BLD AUTO: 0.03 K/UL (ref 0–0.2)
BASOPHILS NFR BLD: 0.4 % (ref 0–1.9)
BILIRUB SERPL-MCNC: 0.9 MG/DL (ref 0.1–1)
BUN SERPL-MCNC: 20 MG/DL (ref 8–23)
CALCIUM SERPL-MCNC: 8.7 MG/DL (ref 8.7–10.5)
CHLORIDE SERPL-SCNC: 107 MMOL/L (ref 95–110)
CO2 SERPL-SCNC: 22 MMOL/L (ref 23–29)
CREAT SERPL-MCNC: 1 MG/DL (ref 0.5–1.4)
DIFFERENTIAL METHOD: ABNORMAL
EOSINOPHIL # BLD AUTO: 0.1 K/UL (ref 0–0.5)
EOSINOPHIL NFR BLD: 1.4 % (ref 0–8)
ERYTHROCYTE [DISTWIDTH] IN BLOOD BY AUTOMATED COUNT: 15.9 % (ref 11.5–14.5)
EST. GFR  (AFRICAN AMERICAN): >60 ML/MIN/1.73 M^2
EST. GFR  (NON AFRICAN AMERICAN): >60 ML/MIN/1.73 M^2
GLUCOSE SERPL-MCNC: 103 MG/DL (ref 70–110)
HCT VFR BLD AUTO: 29.3 % (ref 40–54)
HGB BLD-MCNC: 9.8 G/DL (ref 14–18)
IMM GRANULOCYTES # BLD AUTO: 0.07 K/UL (ref 0–0.04)
IMM GRANULOCYTES NFR BLD AUTO: 0.9 % (ref 0–0.5)
LYMPHOCYTES # BLD AUTO: 1.5 K/UL (ref 1–4.8)
LYMPHOCYTES NFR BLD: 19.6 % (ref 18–48)
MAGNESIUM SERPL-MCNC: 1.9 MG/DL (ref 1.6–2.6)
MCH RBC QN AUTO: 29.7 PG (ref 27–31)
MCHC RBC AUTO-ENTMCNC: 33.4 G/DL (ref 32–36)
MCV RBC AUTO: 89 FL (ref 82–98)
MONOCYTES # BLD AUTO: 0.7 K/UL (ref 0.3–1)
MONOCYTES NFR BLD: 9.7 % (ref 4–15)
NEUTROPHILS # BLD AUTO: 5.2 K/UL (ref 1.8–7.7)
NEUTROPHILS NFR BLD: 68 % (ref 38–73)
NRBC BLD-RTO: 0 /100 WBC
PLATELET # BLD AUTO: 217 K/UL (ref 150–450)
PMV BLD AUTO: 9.4 FL (ref 9.2–12.9)
POCT GLUCOSE: 106 MG/DL (ref 70–110)
POCT GLUCOSE: 114 MG/DL (ref 70–110)
POCT GLUCOSE: 126 MG/DL (ref 70–110)
POCT GLUCOSE: 127 MG/DL (ref 70–110)
POCT GLUCOSE: 184 MG/DL (ref 70–110)
POTASSIUM SERPL-SCNC: 3.7 MMOL/L (ref 3.5–5.1)
PROT SERPL-MCNC: 6.1 G/DL (ref 6–8.4)
RBC # BLD AUTO: 3.3 M/UL (ref 4.6–6.2)
SODIUM SERPL-SCNC: 139 MMOL/L (ref 136–145)
WBC # BLD AUTO: 7.64 K/UL (ref 3.9–12.7)

## 2021-08-30 PROCEDURE — 96372 THER/PROPH/DIAG INJ SC/IM: CPT | Mod: 59

## 2021-08-30 PROCEDURE — 80053 COMPREHEN METABOLIC PANEL: CPT | Performed by: PHYSICIAN ASSISTANT

## 2021-08-30 PROCEDURE — 99225 PR SUBSEQUENT OBSERVATION CARE,LEVEL II: CPT | Mod: ,,, | Performed by: PHYSICIAN ASSISTANT

## 2021-08-30 PROCEDURE — 25000003 PHARM REV CODE 250: Performed by: PHYSICIAN ASSISTANT

## 2021-08-30 PROCEDURE — 99225 PR SUBSEQUENT OBSERVATION CARE,LEVEL II: ICD-10-PCS | Mod: ,,, | Performed by: PHYSICIAN ASSISTANT

## 2021-08-30 PROCEDURE — G0378 HOSPITAL OBSERVATION PER HR: HCPCS

## 2021-08-30 PROCEDURE — 85025 COMPLETE CBC W/AUTO DIFF WBC: CPT | Performed by: PHYSICIAN ASSISTANT

## 2021-08-30 PROCEDURE — 83735 ASSAY OF MAGNESIUM: CPT | Performed by: PHYSICIAN ASSISTANT

## 2021-08-30 PROCEDURE — 36415 COLL VENOUS BLD VENIPUNCTURE: CPT | Performed by: PHYSICIAN ASSISTANT

## 2021-08-30 RX ADMIN — ATORVASTATIN CALCIUM 40 MG: 20 TABLET, FILM COATED ORAL at 08:08

## 2021-08-30 RX ADMIN — INSULIN ASPART 4 UNITS: 100 INJECTION, SOLUTION INTRAVENOUS; SUBCUTANEOUS at 12:08

## 2021-08-30 RX ADMIN — MELATONIN TAB 3 MG 6 MG: 3 TAB at 10:08

## 2021-08-30 RX ADMIN — OXYBUTYNIN CHLORIDE 5 MG: 5 TABLET, EXTENDED RELEASE ORAL at 08:08

## 2021-08-30 RX ADMIN — LACTOBACILLUS ACIDOPHILUS / LACTOBACILLUS BULGARICUS 1 EACH: 100 MILLION CFU STRENGTH GRANULES at 10:08

## 2021-08-30 RX ADMIN — HYDROCODONE BITARTRATE AND ACETAMINOPHEN 1 TABLET: 5; 325 TABLET ORAL at 10:08

## 2021-08-30 RX ADMIN — Medication 5000 UNITS: at 08:08

## 2021-08-30 RX ADMIN — DONEPEZIL HYDROCHLORIDE 5 MG: 5 TABLET, FILM COATED ORAL at 10:08

## 2021-08-30 RX ADMIN — Medication 1 CAPSULE: at 08:08

## 2021-08-30 RX ADMIN — LOSARTAN POTASSIUM 100 MG: 50 TABLET, FILM COATED ORAL at 08:08

## 2021-08-30 RX ADMIN — LACTOBACILLUS ACIDOPHILUS / LACTOBACILLUS BULGARICUS 1 EACH: 100 MILLION CFU STRENGTH GRANULES at 08:08

## 2021-08-30 RX ADMIN — ATENOLOL 50 MG: 50 TABLET ORAL at 08:08

## 2021-08-30 RX ADMIN — SENNOSIDES AND DOCUSATE SODIUM 1 TABLET: 8.6; 5 TABLET ORAL at 09:08

## 2021-08-30 RX ADMIN — INSULIN ASPART 4 UNITS: 100 INJECTION, SOLUTION INTRAVENOUS; SUBCUTANEOUS at 08:08

## 2021-08-30 RX ADMIN — SENNOSIDES AND DOCUSATE SODIUM 1 TABLET: 8.6; 5 TABLET ORAL at 08:08

## 2021-08-30 RX ADMIN — SERTRALINE HYDROCHLORIDE 200 MG: 100 TABLET ORAL at 08:08

## 2021-08-30 RX ADMIN — PANTOPRAZOLE SODIUM 40 MG: 40 TABLET, DELAYED RELEASE ORAL at 08:08

## 2021-08-30 RX ADMIN — INSULIN DETEMIR 10 UNITS: 100 INJECTION, SOLUTION SUBCUTANEOUS at 10:08

## 2021-08-30 RX ADMIN — PANTOPRAZOLE SODIUM 40 MG: 40 TABLET, DELAYED RELEASE ORAL at 10:08

## 2021-08-30 RX ADMIN — INSULIN ASPART 4 UNITS: 100 INJECTION, SOLUTION INTRAVENOUS; SUBCUTANEOUS at 05:08

## 2021-08-31 LAB
POCT GLUCOSE: 112 MG/DL (ref 70–110)
POCT GLUCOSE: 115 MG/DL (ref 70–110)
POCT GLUCOSE: 145 MG/DL (ref 70–110)
POCT GLUCOSE: 162 MG/DL (ref 70–110)

## 2021-08-31 PROCEDURE — 99225 PR SUBSEQUENT OBSERVATION CARE,LEVEL II: CPT | Mod: ,,, | Performed by: PHYSICIAN ASSISTANT

## 2021-08-31 PROCEDURE — G0378 HOSPITAL OBSERVATION PER HR: HCPCS

## 2021-08-31 PROCEDURE — 96372 THER/PROPH/DIAG INJ SC/IM: CPT | Mod: 59

## 2021-08-31 PROCEDURE — 99225 PR SUBSEQUENT OBSERVATION CARE,LEVEL II: ICD-10-PCS | Mod: ,,, | Performed by: PHYSICIAN ASSISTANT

## 2021-08-31 PROCEDURE — 25000003 PHARM REV CODE 250: Performed by: PHYSICIAN ASSISTANT

## 2021-08-31 RX ADMIN — INSULIN ASPART 4 UNITS: 100 INJECTION, SOLUTION INTRAVENOUS; SUBCUTANEOUS at 09:08

## 2021-08-31 RX ADMIN — LACTOBACILLUS ACIDOPHILUS / LACTOBACILLUS BULGARICUS 1 EACH: 100 MILLION CFU STRENGTH GRANULES at 08:08

## 2021-08-31 RX ADMIN — INSULIN ASPART 4 UNITS: 100 INJECTION, SOLUTION INTRAVENOUS; SUBCUTANEOUS at 12:08

## 2021-08-31 RX ADMIN — LACTOBACILLUS ACIDOPHILUS / LACTOBACILLUS BULGARICUS 1 EACH: 100 MILLION CFU STRENGTH GRANULES at 09:08

## 2021-08-31 RX ADMIN — PANTOPRAZOLE SODIUM 40 MG: 40 TABLET, DELAYED RELEASE ORAL at 08:08

## 2021-08-31 RX ADMIN — OXYBUTYNIN CHLORIDE 5 MG: 5 TABLET, EXTENDED RELEASE ORAL at 08:08

## 2021-08-31 RX ADMIN — SENNOSIDES AND DOCUSATE SODIUM 1 TABLET: 8.6; 5 TABLET ORAL at 08:08

## 2021-08-31 RX ADMIN — Medication 1 CAPSULE: at 08:08

## 2021-08-31 RX ADMIN — INSULIN DETEMIR 10 UNITS: 100 INJECTION, SOLUTION SUBCUTANEOUS at 09:08

## 2021-08-31 RX ADMIN — Medication 5000 UNITS: at 08:08

## 2021-08-31 RX ADMIN — ATORVASTATIN CALCIUM 40 MG: 20 TABLET, FILM COATED ORAL at 08:08

## 2021-08-31 RX ADMIN — LOSARTAN POTASSIUM 100 MG: 50 TABLET, FILM COATED ORAL at 08:08

## 2021-08-31 RX ADMIN — DONEPEZIL HYDROCHLORIDE 5 MG: 5 TABLET, FILM COATED ORAL at 08:08

## 2021-08-31 RX ADMIN — SERTRALINE HYDROCHLORIDE 200 MG: 100 TABLET ORAL at 08:08

## 2021-08-31 RX ADMIN — ATENOLOL 50 MG: 50 TABLET ORAL at 08:08

## 2021-08-31 RX ADMIN — INSULIN ASPART 4 UNITS: 100 INJECTION, SOLUTION INTRAVENOUS; SUBCUTANEOUS at 04:08

## 2021-09-01 ENCOUNTER — PATIENT MESSAGE (OUTPATIENT)
Dept: PSYCHIATRY | Facility: CLINIC | Age: 61
End: 2021-09-01

## 2021-09-01 LAB
ALBUMIN SERPL BCP-MCNC: 3.1 G/DL (ref 3.5–5.2)
ALP SERPL-CCNC: 134 U/L (ref 55–135)
ALT SERPL W/O P-5'-P-CCNC: 14 U/L (ref 10–44)
ANION GAP SERPL CALC-SCNC: 10 MMOL/L (ref 8–16)
AST SERPL-CCNC: 17 U/L (ref 10–40)
BASOPHILS # BLD AUTO: 0.03 K/UL (ref 0–0.2)
BASOPHILS NFR BLD: 0.4 % (ref 0–1.9)
BILIRUB SERPL-MCNC: 0.8 MG/DL (ref 0.1–1)
BUN SERPL-MCNC: 21 MG/DL (ref 8–23)
CALCIUM SERPL-MCNC: 8.6 MG/DL (ref 8.7–10.5)
CHLORIDE SERPL-SCNC: 110 MMOL/L (ref 95–110)
CO2 SERPL-SCNC: 20 MMOL/L (ref 23–29)
CREAT SERPL-MCNC: 1.1 MG/DL (ref 0.5–1.4)
DIFFERENTIAL METHOD: ABNORMAL
EOSINOPHIL # BLD AUTO: 0.1 K/UL (ref 0–0.5)
EOSINOPHIL NFR BLD: 1.5 % (ref 0–8)
ERYTHROCYTE [DISTWIDTH] IN BLOOD BY AUTOMATED COUNT: 16.5 % (ref 11.5–14.5)
EST. GFR  (AFRICAN AMERICAN): >60 ML/MIN/1.73 M^2
EST. GFR  (NON AFRICAN AMERICAN): >60 ML/MIN/1.73 M^2
GLUCOSE SERPL-MCNC: 96 MG/DL (ref 70–110)
HCT VFR BLD AUTO: 31.9 % (ref 40–54)
HGB BLD-MCNC: 10.4 G/DL (ref 14–18)
IMM GRANULOCYTES # BLD AUTO: 0.06 K/UL (ref 0–0.04)
IMM GRANULOCYTES NFR BLD AUTO: 0.8 % (ref 0–0.5)
LYMPHOCYTES # BLD AUTO: 1.5 K/UL (ref 1–4.8)
LYMPHOCYTES NFR BLD: 18.6 % (ref 18–48)
MAGNESIUM SERPL-MCNC: 2 MG/DL (ref 1.6–2.6)
MCH RBC QN AUTO: 29.7 PG (ref 27–31)
MCHC RBC AUTO-ENTMCNC: 32.6 G/DL (ref 32–36)
MCV RBC AUTO: 91 FL (ref 82–98)
MONOCYTES # BLD AUTO: 0.8 K/UL (ref 0.3–1)
MONOCYTES NFR BLD: 10.2 % (ref 4–15)
NEUTROPHILS # BLD AUTO: 5.4 K/UL (ref 1.8–7.7)
NEUTROPHILS NFR BLD: 68.5 % (ref 38–73)
NRBC BLD-RTO: 0 /100 WBC
PLATELET # BLD AUTO: 192 K/UL (ref 150–450)
PMV BLD AUTO: 9.2 FL (ref 9.2–12.9)
POCT GLUCOSE: 106 MG/DL (ref 70–110)
POCT GLUCOSE: 119 MG/DL (ref 70–110)
POCT GLUCOSE: 129 MG/DL (ref 70–110)
POCT GLUCOSE: 154 MG/DL (ref 70–110)
POTASSIUM SERPL-SCNC: 3.5 MMOL/L (ref 3.5–5.1)
PROT SERPL-MCNC: 6 G/DL (ref 6–8.4)
RBC # BLD AUTO: 3.5 M/UL (ref 4.6–6.2)
SODIUM SERPL-SCNC: 140 MMOL/L (ref 136–145)
WBC # BLD AUTO: 7.87 K/UL (ref 3.9–12.7)

## 2021-09-01 PROCEDURE — 36415 COLL VENOUS BLD VENIPUNCTURE: CPT | Performed by: PHYSICIAN ASSISTANT

## 2021-09-01 PROCEDURE — 97530 THERAPEUTIC ACTIVITIES: CPT

## 2021-09-01 PROCEDURE — 97535 SELF CARE MNGMENT TRAINING: CPT | Mod: 59

## 2021-09-01 PROCEDURE — 85025 COMPLETE CBC W/AUTO DIFF WBC: CPT | Performed by: PHYSICIAN ASSISTANT

## 2021-09-01 PROCEDURE — 99225 PR SUBSEQUENT OBSERVATION CARE,LEVEL II: ICD-10-PCS | Mod: ,,, | Performed by: PHYSICIAN ASSISTANT

## 2021-09-01 PROCEDURE — 97116 GAIT TRAINING THERAPY: CPT | Mod: CQ

## 2021-09-01 PROCEDURE — G0378 HOSPITAL OBSERVATION PER HR: HCPCS

## 2021-09-01 PROCEDURE — 97530 THERAPEUTIC ACTIVITIES: CPT | Mod: CQ

## 2021-09-01 PROCEDURE — 96372 THER/PROPH/DIAG INJ SC/IM: CPT | Mod: 59

## 2021-09-01 PROCEDURE — 80053 COMPREHEN METABOLIC PANEL: CPT | Performed by: PHYSICIAN ASSISTANT

## 2021-09-01 PROCEDURE — 25000003 PHARM REV CODE 250: Performed by: PHYSICIAN ASSISTANT

## 2021-09-01 PROCEDURE — 83735 ASSAY OF MAGNESIUM: CPT | Performed by: PHYSICIAN ASSISTANT

## 2021-09-01 PROCEDURE — 99225 PR SUBSEQUENT OBSERVATION CARE,LEVEL II: CPT | Mod: ,,, | Performed by: PHYSICIAN ASSISTANT

## 2021-09-01 RX ADMIN — SENNOSIDES AND DOCUSATE SODIUM 1 TABLET: 8.6; 5 TABLET ORAL at 09:09

## 2021-09-01 RX ADMIN — LACTOBACILLUS ACIDOPHILUS / LACTOBACILLUS BULGARICUS 1 EACH: 100 MILLION CFU STRENGTH GRANULES at 08:09

## 2021-09-01 RX ADMIN — INSULIN ASPART 2 UNITS: 100 INJECTION, SOLUTION INTRAVENOUS; SUBCUTANEOUS at 01:09

## 2021-09-01 RX ADMIN — DONEPEZIL HYDROCHLORIDE 5 MG: 5 TABLET, FILM COATED ORAL at 08:09

## 2021-09-01 RX ADMIN — LOSARTAN POTASSIUM 100 MG: 50 TABLET, FILM COATED ORAL at 09:09

## 2021-09-01 RX ADMIN — HYDROCODONE BITARTRATE AND ACETAMINOPHEN 1 TABLET: 5; 325 TABLET ORAL at 10:09

## 2021-09-01 RX ADMIN — ATENOLOL 50 MG: 50 TABLET ORAL at 09:09

## 2021-09-01 RX ADMIN — OXYBUTYNIN CHLORIDE 5 MG: 5 TABLET, EXTENDED RELEASE ORAL at 09:09

## 2021-09-01 RX ADMIN — Medication 1 CAPSULE: at 09:09

## 2021-09-01 RX ADMIN — Medication 5000 UNITS: at 09:09

## 2021-09-01 RX ADMIN — PANTOPRAZOLE SODIUM 40 MG: 40 TABLET, DELAYED RELEASE ORAL at 08:09

## 2021-09-01 RX ADMIN — INSULIN DETEMIR 10 UNITS: 100 INJECTION, SOLUTION SUBCUTANEOUS at 08:09

## 2021-09-01 RX ADMIN — INSULIN ASPART 4 UNITS: 100 INJECTION, SOLUTION INTRAVENOUS; SUBCUTANEOUS at 09:09

## 2021-09-01 RX ADMIN — INSULIN ASPART 4 UNITS: 100 INJECTION, SOLUTION INTRAVENOUS; SUBCUTANEOUS at 01:09

## 2021-09-01 RX ADMIN — ATORVASTATIN CALCIUM 40 MG: 20 TABLET, FILM COATED ORAL at 09:09

## 2021-09-01 RX ADMIN — SENNOSIDES AND DOCUSATE SODIUM 1 TABLET: 8.6; 5 TABLET ORAL at 08:09

## 2021-09-01 RX ADMIN — SERTRALINE HYDROCHLORIDE 200 MG: 100 TABLET ORAL at 09:09

## 2021-09-01 RX ADMIN — PANTOPRAZOLE SODIUM 40 MG: 40 TABLET, DELAYED RELEASE ORAL at 09:09

## 2021-09-01 RX ADMIN — INSULIN ASPART 4 UNITS: 100 INJECTION, SOLUTION INTRAVENOUS; SUBCUTANEOUS at 06:09

## 2021-09-02 ENCOUNTER — PATIENT MESSAGE (OUTPATIENT)
Dept: PSYCHIATRY | Facility: CLINIC | Age: 61
End: 2021-09-02

## 2021-09-02 LAB
POCT GLUCOSE: 107 MG/DL (ref 70–110)
POCT GLUCOSE: 110 MG/DL (ref 70–110)
POCT GLUCOSE: 121 MG/DL (ref 70–110)
POCT GLUCOSE: 201 MG/DL (ref 70–110)

## 2021-09-02 PROCEDURE — 25000003 PHARM REV CODE 250: Performed by: PHYSICIAN ASSISTANT

## 2021-09-02 PROCEDURE — G0378 HOSPITAL OBSERVATION PER HR: HCPCS

## 2021-09-02 PROCEDURE — 99226 PR SUBSEQUENT OBSERVATION CARE,LEVEL III: CPT | Mod: ,,, | Performed by: NURSE PRACTITIONER

## 2021-09-02 PROCEDURE — 63600175 PHARM REV CODE 636 W HCPCS: Performed by: PHYSICIAN ASSISTANT

## 2021-09-02 PROCEDURE — 99226 PR SUBSEQUENT OBSERVATION CARE,LEVEL III: ICD-10-PCS | Mod: ,,, | Performed by: NURSE PRACTITIONER

## 2021-09-02 PROCEDURE — 96372 THER/PROPH/DIAG INJ SC/IM: CPT | Mod: 59

## 2021-09-02 RX ORDER — ACETAMINOPHEN 500 MG
5000 TABLET ORAL DAILY
Status: DISCONTINUED | OUTPATIENT
Start: 2021-09-03 | End: 2021-09-15 | Stop reason: HOSPADM

## 2021-09-02 RX ADMIN — ATORVASTATIN CALCIUM 40 MG: 20 TABLET, FILM COATED ORAL at 09:09

## 2021-09-02 RX ADMIN — INSULIN DETEMIR 10 UNITS: 100 INJECTION, SOLUTION SUBCUTANEOUS at 09:09

## 2021-09-02 RX ADMIN — LOSARTAN POTASSIUM 100 MG: 50 TABLET, FILM COATED ORAL at 11:09

## 2021-09-02 RX ADMIN — PANTOPRAZOLE SODIUM 40 MG: 40 TABLET, DELAYED RELEASE ORAL at 09:09

## 2021-09-02 RX ADMIN — DONEPEZIL HYDROCHLORIDE 5 MG: 5 TABLET, FILM COATED ORAL at 09:09

## 2021-09-02 RX ADMIN — Medication 5000 UNITS: at 09:09

## 2021-09-02 RX ADMIN — OXYBUTYNIN CHLORIDE 5 MG: 5 TABLET, EXTENDED RELEASE ORAL at 09:09

## 2021-09-02 RX ADMIN — INSULIN ASPART 4 UNITS: 100 INJECTION, SOLUTION INTRAVENOUS; SUBCUTANEOUS at 11:09

## 2021-09-02 RX ADMIN — SENNOSIDES AND DOCUSATE SODIUM 1 TABLET: 8.6; 5 TABLET ORAL at 09:09

## 2021-09-02 RX ADMIN — ATENOLOL 50 MG: 50 TABLET ORAL at 09:09

## 2021-09-02 RX ADMIN — SERTRALINE HYDROCHLORIDE 200 MG: 100 TABLET ORAL at 09:09

## 2021-09-02 RX ADMIN — INSULIN ASPART 4 UNITS: 100 INJECTION, SOLUTION INTRAVENOUS; SUBCUTANEOUS at 08:09

## 2021-09-02 RX ADMIN — LACTOBACILLUS ACIDOPHILUS / LACTOBACILLUS BULGARICUS 1 EACH: 100 MILLION CFU STRENGTH GRANULES at 09:09

## 2021-09-02 RX ADMIN — Medication 1 CAPSULE: at 09:09

## 2021-09-02 RX ADMIN — INSULIN ASPART 4 UNITS: 100 INJECTION, SOLUTION INTRAVENOUS; SUBCUTANEOUS at 05:09

## 2021-09-03 LAB
ABO + RH BLD: NORMAL
ALBUMIN SERPL BCP-MCNC: 3.2 G/DL (ref 3.5–5.2)
ALP SERPL-CCNC: 138 U/L (ref 55–135)
ALT SERPL W/O P-5'-P-CCNC: 12 U/L (ref 10–44)
ANION GAP SERPL CALC-SCNC: 8 MMOL/L (ref 8–16)
AST SERPL-CCNC: 16 U/L (ref 10–40)
BASOPHILS # BLD AUTO: 0.03 K/UL (ref 0–0.2)
BASOPHILS NFR BLD: 0.5 % (ref 0–1.9)
BILIRUB SERPL-MCNC: 0.7 MG/DL (ref 0.1–1)
BLD GP AB SCN CELLS X3 SERPL QL: NORMAL
BUN SERPL-MCNC: 20 MG/DL (ref 8–23)
CALCIUM SERPL-MCNC: 8.7 MG/DL (ref 8.7–10.5)
CHLORIDE SERPL-SCNC: 113 MMOL/L (ref 95–110)
CO2 SERPL-SCNC: 21 MMOL/L (ref 23–29)
CREAT SERPL-MCNC: 1.1 MG/DL (ref 0.5–1.4)
DIFFERENTIAL METHOD: ABNORMAL
EOSINOPHIL # BLD AUTO: 0.1 K/UL (ref 0–0.5)
EOSINOPHIL NFR BLD: 1.1 % (ref 0–8)
ERYTHROCYTE [DISTWIDTH] IN BLOOD BY AUTOMATED COUNT: 16.7 % (ref 11.5–14.5)
EST. GFR  (AFRICAN AMERICAN): >60 ML/MIN/1.73 M^2
EST. GFR  (NON AFRICAN AMERICAN): >60 ML/MIN/1.73 M^2
GLUCOSE SERPL-MCNC: 95 MG/DL (ref 70–110)
HCT VFR BLD AUTO: 29.9 % (ref 40–54)
HGB BLD-MCNC: 9.9 G/DL (ref 14–18)
IMM GRANULOCYTES # BLD AUTO: 0.02 K/UL (ref 0–0.04)
IMM GRANULOCYTES NFR BLD AUTO: 0.3 % (ref 0–0.5)
LYMPHOCYTES # BLD AUTO: 1.3 K/UL (ref 1–4.8)
LYMPHOCYTES NFR BLD: 19.8 % (ref 18–48)
MAGNESIUM SERPL-MCNC: 2.1 MG/DL (ref 1.6–2.6)
MCH RBC QN AUTO: 29.7 PG (ref 27–31)
MCHC RBC AUTO-ENTMCNC: 33.1 G/DL (ref 32–36)
MCV RBC AUTO: 90 FL (ref 82–98)
MONOCYTES # BLD AUTO: 0.7 K/UL (ref 0.3–1)
MONOCYTES NFR BLD: 10.9 % (ref 4–15)
NEUTROPHILS # BLD AUTO: 4.3 K/UL (ref 1.8–7.7)
NEUTROPHILS NFR BLD: 67.4 % (ref 38–73)
NRBC BLD-RTO: 0 /100 WBC
PLATELET # BLD AUTO: 192 K/UL (ref 150–450)
PMV BLD AUTO: 9 FL (ref 9.2–12.9)
POCT GLUCOSE: 105 MG/DL (ref 70–110)
POCT GLUCOSE: 106 MG/DL (ref 70–110)
POCT GLUCOSE: 111 MG/DL (ref 70–110)
POCT GLUCOSE: 143 MG/DL (ref 70–110)
POCT GLUCOSE: 93 MG/DL (ref 70–110)
POTASSIUM SERPL-SCNC: 3.9 MMOL/L (ref 3.5–5.1)
PROT SERPL-MCNC: 6.1 G/DL (ref 6–8.4)
RBC # BLD AUTO: 3.33 M/UL (ref 4.6–6.2)
SARS-COV-2 RNA RESP QL NAA+PROBE: NOT DETECTED
SODIUM SERPL-SCNC: 142 MMOL/L (ref 136–145)
WBC # BLD AUTO: 6.35 K/UL (ref 3.9–12.7)

## 2021-09-03 PROCEDURE — 83735 ASSAY OF MAGNESIUM: CPT | Performed by: PHYSICIAN ASSISTANT

## 2021-09-03 PROCEDURE — G0378 HOSPITAL OBSERVATION PER HR: HCPCS

## 2021-09-03 PROCEDURE — 25000003 PHARM REV CODE 250: Performed by: PHYSICIAN ASSISTANT

## 2021-09-03 PROCEDURE — 99226 PR SUBSEQUENT OBSERVATION CARE,LEVEL III: CPT | Mod: ,,, | Performed by: NURSE PRACTITIONER

## 2021-09-03 PROCEDURE — 36415 COLL VENOUS BLD VENIPUNCTURE: CPT | Performed by: PHYSICIAN ASSISTANT

## 2021-09-03 PROCEDURE — 99226 PR SUBSEQUENT OBSERVATION CARE,LEVEL III: ICD-10-PCS | Mod: ,,, | Performed by: NURSE PRACTITIONER

## 2021-09-03 PROCEDURE — 97530 THERAPEUTIC ACTIVITIES: CPT

## 2021-09-03 PROCEDURE — 86900 BLOOD TYPING SEROLOGIC ABO: CPT | Performed by: PHYSICIAN ASSISTANT

## 2021-09-03 PROCEDURE — 96372 THER/PROPH/DIAG INJ SC/IM: CPT | Mod: 59

## 2021-09-03 PROCEDURE — 25000003 PHARM REV CODE 250: Performed by: INTERNAL MEDICINE

## 2021-09-03 PROCEDURE — U0005 INFEC AGEN DETEC AMPLI PROBE: HCPCS | Performed by: PHYSICIAN ASSISTANT

## 2021-09-03 PROCEDURE — 80053 COMPREHEN METABOLIC PANEL: CPT | Performed by: PHYSICIAN ASSISTANT

## 2021-09-03 PROCEDURE — 85025 COMPLETE CBC W/AUTO DIFF WBC: CPT | Performed by: PHYSICIAN ASSISTANT

## 2021-09-03 PROCEDURE — 97535 SELF CARE MNGMENT TRAINING: CPT

## 2021-09-03 PROCEDURE — U0003 INFECTIOUS AGENT DETECTION BY NUCLEIC ACID (DNA OR RNA); SEVERE ACUTE RESPIRATORY SYNDROME CORONAVIRUS 2 (SARS-COV-2) (CORONAVIRUS DISEASE [COVID-19]), AMPLIFIED PROBE TECHNIQUE, MAKING USE OF HIGH THROUGHPUT TECHNOLOGIES AS DESCRIBED BY CMS-2020-01-R: HCPCS | Performed by: PHYSICIAN ASSISTANT

## 2021-09-03 RX ADMIN — INSULIN ASPART 4 UNITS: 100 INJECTION, SOLUTION INTRAVENOUS; SUBCUTANEOUS at 08:09

## 2021-09-03 RX ADMIN — MELATONIN TAB 3 MG 6 MG: 3 TAB at 09:09

## 2021-09-03 RX ADMIN — SENNOSIDES AND DOCUSATE SODIUM 1 TABLET: 8.6; 5 TABLET ORAL at 09:09

## 2021-09-03 RX ADMIN — Medication 1 CAPSULE: at 08:09

## 2021-09-03 RX ADMIN — PANTOPRAZOLE SODIUM 40 MG: 40 TABLET, DELAYED RELEASE ORAL at 08:09

## 2021-09-03 RX ADMIN — INSULIN DETEMIR 10 UNITS: 100 INJECTION, SOLUTION SUBCUTANEOUS at 09:09

## 2021-09-03 RX ADMIN — ATENOLOL 50 MG: 50 TABLET ORAL at 11:09

## 2021-09-03 RX ADMIN — INSULIN ASPART 4 UNITS: 100 INJECTION, SOLUTION INTRAVENOUS; SUBCUTANEOUS at 04:09

## 2021-09-03 RX ADMIN — LOSARTAN POTASSIUM 100 MG: 50 TABLET, FILM COATED ORAL at 08:09

## 2021-09-03 RX ADMIN — CHOLECALCIFEROL TAB 125 MCG (5000 UNIT) 5000 UNITS: 125 TAB at 08:09

## 2021-09-03 RX ADMIN — LACTOBACILLUS ACIDOPHILUS / LACTOBACILLUS BULGARICUS 1 EACH: 100 MILLION CFU STRENGTH GRANULES at 09:09

## 2021-09-03 RX ADMIN — INSULIN ASPART 4 UNITS: 100 INJECTION, SOLUTION INTRAVENOUS; SUBCUTANEOUS at 11:09

## 2021-09-03 RX ADMIN — DONEPEZIL HYDROCHLORIDE 5 MG: 5 TABLET, FILM COATED ORAL at 09:09

## 2021-09-03 RX ADMIN — OXYBUTYNIN CHLORIDE 5 MG: 5 TABLET, EXTENDED RELEASE ORAL at 11:09

## 2021-09-03 RX ADMIN — LACTOBACILLUS ACIDOPHILUS / LACTOBACILLUS BULGARICUS 1 EACH: 100 MILLION CFU STRENGTH GRANULES at 08:09

## 2021-09-03 RX ADMIN — SERTRALINE HYDROCHLORIDE 200 MG: 100 TABLET ORAL at 08:09

## 2021-09-03 RX ADMIN — PANTOPRAZOLE SODIUM 40 MG: 40 TABLET, DELAYED RELEASE ORAL at 09:09

## 2021-09-03 RX ADMIN — ATORVASTATIN CALCIUM 40 MG: 20 TABLET, FILM COATED ORAL at 08:09

## 2021-09-04 LAB
POCT GLUCOSE: 133 MG/DL (ref 70–110)
POCT GLUCOSE: 151 MG/DL (ref 70–110)
POCT GLUCOSE: 93 MG/DL (ref 70–110)
POCT GLUCOSE: 98 MG/DL (ref 70–110)

## 2021-09-04 PROCEDURE — 96372 THER/PROPH/DIAG INJ SC/IM: CPT | Mod: 59

## 2021-09-04 PROCEDURE — 99226 PR SUBSEQUENT OBSERVATION CARE,LEVEL III: CPT | Mod: ,,, | Performed by: NURSE PRACTITIONER

## 2021-09-04 PROCEDURE — 99226 PR SUBSEQUENT OBSERVATION CARE,LEVEL III: ICD-10-PCS | Mod: ,,, | Performed by: NURSE PRACTITIONER

## 2021-09-04 PROCEDURE — G0378 HOSPITAL OBSERVATION PER HR: HCPCS

## 2021-09-04 PROCEDURE — 25000003 PHARM REV CODE 250: Performed by: INTERNAL MEDICINE

## 2021-09-04 PROCEDURE — 25000003 PHARM REV CODE 250: Performed by: PHYSICIAN ASSISTANT

## 2021-09-04 RX ORDER — CARVEDILOL 6.25 MG/1
6.25 TABLET ORAL 2 TIMES DAILY
Status: DISCONTINUED | OUTPATIENT
Start: 2021-09-05 | End: 2021-09-15 | Stop reason: HOSPADM

## 2021-09-04 RX ADMIN — ATORVASTATIN CALCIUM 40 MG: 20 TABLET, FILM COATED ORAL at 08:09

## 2021-09-04 RX ADMIN — LACTOBACILLUS ACIDOPHILUS / LACTOBACILLUS BULGARICUS 1 EACH: 100 MILLION CFU STRENGTH GRANULES at 08:09

## 2021-09-04 RX ADMIN — INSULIN ASPART 1 UNITS: 100 INJECTION, SOLUTION INTRAVENOUS; SUBCUTANEOUS at 08:09

## 2021-09-04 RX ADMIN — CHOLECALCIFEROL TAB 125 MCG (5000 UNIT) 5000 UNITS: 125 TAB at 08:09

## 2021-09-04 RX ADMIN — SENNOSIDES AND DOCUSATE SODIUM 1 TABLET: 8.6; 5 TABLET ORAL at 08:09

## 2021-09-04 RX ADMIN — INSULIN ASPART 4 UNITS: 100 INJECTION, SOLUTION INTRAVENOUS; SUBCUTANEOUS at 12:09

## 2021-09-04 RX ADMIN — ATENOLOL 50 MG: 50 TABLET ORAL at 08:09

## 2021-09-04 RX ADMIN — INSULIN ASPART 4 UNITS: 100 INJECTION, SOLUTION INTRAVENOUS; SUBCUTANEOUS at 08:09

## 2021-09-04 RX ADMIN — INSULIN ASPART 4 UNITS: 100 INJECTION, SOLUTION INTRAVENOUS; SUBCUTANEOUS at 05:09

## 2021-09-04 RX ADMIN — PANTOPRAZOLE SODIUM 40 MG: 40 TABLET, DELAYED RELEASE ORAL at 08:09

## 2021-09-04 RX ADMIN — DONEPEZIL HYDROCHLORIDE 5 MG: 5 TABLET, FILM COATED ORAL at 08:09

## 2021-09-04 RX ADMIN — LOSARTAN POTASSIUM 100 MG: 50 TABLET, FILM COATED ORAL at 08:09

## 2021-09-04 RX ADMIN — INSULIN DETEMIR 10 UNITS: 100 INJECTION, SOLUTION SUBCUTANEOUS at 08:09

## 2021-09-04 RX ADMIN — SERTRALINE HYDROCHLORIDE 200 MG: 100 TABLET ORAL at 08:09

## 2021-09-04 RX ADMIN — Medication 1 CAPSULE: at 08:09

## 2021-09-04 RX ADMIN — OXYBUTYNIN CHLORIDE 5 MG: 5 TABLET, EXTENDED RELEASE ORAL at 12:09

## 2021-09-04 RX ADMIN — MELATONIN TAB 3 MG 6 MG: 3 TAB at 08:09

## 2021-09-05 LAB
POCT GLUCOSE: 102 MG/DL (ref 70–110)
POCT GLUCOSE: 151 MG/DL (ref 70–110)
POCT GLUCOSE: 152 MG/DL (ref 70–110)
POCT GLUCOSE: 155 MG/DL (ref 70–110)

## 2021-09-05 PROCEDURE — 25000003 PHARM REV CODE 250: Performed by: INTERNAL MEDICINE

## 2021-09-05 PROCEDURE — G0378 HOSPITAL OBSERVATION PER HR: HCPCS

## 2021-09-05 PROCEDURE — 99225 PR SUBSEQUENT OBSERVATION CARE,LEVEL II: ICD-10-PCS | Mod: ,,, | Performed by: NURSE PRACTITIONER

## 2021-09-05 PROCEDURE — 97530 THERAPEUTIC ACTIVITIES: CPT

## 2021-09-05 PROCEDURE — 96372 THER/PROPH/DIAG INJ SC/IM: CPT | Mod: 59

## 2021-09-05 PROCEDURE — 25000003 PHARM REV CODE 250: Performed by: PHYSICIAN ASSISTANT

## 2021-09-05 PROCEDURE — 25000003 PHARM REV CODE 250: Performed by: NURSE PRACTITIONER

## 2021-09-05 PROCEDURE — 99225 PR SUBSEQUENT OBSERVATION CARE,LEVEL II: CPT | Mod: ,,, | Performed by: NURSE PRACTITIONER

## 2021-09-05 PROCEDURE — C9399 UNCLASSIFIED DRUGS OR BIOLOG: HCPCS | Performed by: PHYSICIAN ASSISTANT

## 2021-09-05 RX ORDER — PANTOPRAZOLE SODIUM 40 MG/1
40 TABLET, DELAYED RELEASE ORAL 2 TIMES DAILY
Qty: 14 TABLET | Refills: 0 | Status: SHIPPED | OUTPATIENT
Start: 2021-09-05 | End: 2022-06-19

## 2021-09-05 RX ORDER — SERTRALINE HYDROCHLORIDE 100 MG/1
200 TABLET, FILM COATED ORAL DAILY
Qty: 14 TABLET | Refills: 0 | Status: SHIPPED | OUTPATIENT
Start: 2021-09-05 | End: 2022-06-19

## 2021-09-05 RX ORDER — HYDROCODONE BITARTRATE AND ACETAMINOPHEN 5; 325 MG/1; MG/1
1 TABLET ORAL EVERY 6 HOURS PRN
Qty: 28 TABLET | Refills: 0 | Status: SHIPPED | OUTPATIENT
Start: 2021-09-05 | End: 2021-09-05 | Stop reason: HOSPADM

## 2021-09-05 RX ORDER — ACETAMINOPHEN 500 MG
1000 TABLET ORAL EVERY 8 HOURS
Qty: 126 TABLET | Refills: 0 | Status: SHIPPED | OUTPATIENT
Start: 2021-09-05 | End: 2021-09-26

## 2021-09-05 RX ORDER — IRBESARTAN 300 MG/1
300 TABLET ORAL NIGHTLY
Qty: 7 TABLET | Refills: 0 | Status: ON HOLD | OUTPATIENT
Start: 2021-09-05 | End: 2022-06-21 | Stop reason: HOSPADM

## 2021-09-05 RX ORDER — ACETAMINOPHEN 500 MG
1000 TABLET ORAL EVERY 8 HOURS
Status: DISCONTINUED | OUTPATIENT
Start: 2021-09-05 | End: 2021-09-10

## 2021-09-05 RX ORDER — CARVEDILOL 6.25 MG/1
6.25 TABLET ORAL 2 TIMES DAILY
Qty: 14 TABLET | Refills: 0 | Status: SHIPPED | OUTPATIENT
Start: 2021-09-05 | End: 2022-06-19

## 2021-09-05 RX ORDER — DONEPEZIL HYDROCHLORIDE 5 MG/1
5 TABLET, FILM COATED ORAL NIGHTLY
Qty: 7 TABLET | Refills: 0 | Status: SHIPPED | OUTPATIENT
Start: 2021-09-05 | End: 2022-06-19

## 2021-09-05 RX ORDER — OXYBUTYNIN CHLORIDE 5 MG/1
5 TABLET, EXTENDED RELEASE ORAL DAILY
Qty: 7 TABLET | Refills: 0 | Status: SHIPPED | OUTPATIENT
Start: 2021-09-05 | End: 2022-06-19

## 2021-09-05 RX ORDER — LIRAGLUTIDE 6 MG/ML
1.8 INJECTION SUBCUTANEOUS DAILY
Qty: 9 ML | Refills: 0 | Status: SHIPPED | OUTPATIENT
Start: 2021-09-05 | End: 2021-09-14 | Stop reason: HOSPADM

## 2021-09-05 RX ORDER — OXYCODONE HYDROCHLORIDE 5 MG/1
5 TABLET ORAL EVERY 4 HOURS PRN
Qty: 42 TABLET | Refills: 0 | Status: SHIPPED | OUTPATIENT
Start: 2021-09-05 | End: 2022-06-19 | Stop reason: ALTCHOICE

## 2021-09-05 RX ORDER — ATORVASTATIN CALCIUM 40 MG/1
40 TABLET, FILM COATED ORAL DAILY
Qty: 7 TABLET | Refills: 0 | Status: SHIPPED | OUTPATIENT
Start: 2021-09-05 | End: 2022-06-19

## 2021-09-05 RX ORDER — OXYCODONE HYDROCHLORIDE 5 MG/1
5 TABLET ORAL EVERY 4 HOURS PRN
Status: DISCONTINUED | OUTPATIENT
Start: 2021-09-05 | End: 2021-09-15 | Stop reason: HOSPADM

## 2021-09-05 RX ORDER — INSULIN ASPART 100 [IU]/ML
4 INJECTION, SOLUTION INTRAVENOUS; SUBCUTANEOUS 3 TIMES DAILY
Qty: 3 ML | Refills: 0 | Status: SHIPPED | OUTPATIENT
Start: 2021-09-05 | End: 2021-09-13 | Stop reason: HOSPADM

## 2021-09-05 RX ORDER — ONDANSETRON 4 MG/1
4 TABLET, ORALLY DISINTEGRATING ORAL EVERY 6 HOURS PRN
Qty: 28 TABLET | Refills: 0 | Status: SHIPPED | OUTPATIENT
Start: 2021-09-05 | End: 2021-09-12

## 2021-09-05 RX ORDER — QUETIAPINE FUMARATE 25 MG/1
25 TABLET, FILM COATED ORAL NIGHTLY PRN
Qty: 7 TABLET | Refills: 0 | Status: ON HOLD | OUTPATIENT
Start: 2021-09-05 | End: 2022-06-21 | Stop reason: HOSPADM

## 2021-09-05 RX ADMIN — INSULIN ASPART 4 UNITS: 100 INJECTION, SOLUTION INTRAVENOUS; SUBCUTANEOUS at 04:09

## 2021-09-05 RX ADMIN — INSULIN ASPART 4 UNITS: 100 INJECTION, SOLUTION INTRAVENOUS; SUBCUTANEOUS at 08:09

## 2021-09-05 RX ADMIN — OXYBUTYNIN CHLORIDE 5 MG: 5 TABLET, EXTENDED RELEASE ORAL at 08:09

## 2021-09-05 RX ADMIN — ATORVASTATIN CALCIUM 40 MG: 20 TABLET, FILM COATED ORAL at 08:09

## 2021-09-05 RX ADMIN — CHOLECALCIFEROL TAB 125 MCG (5000 UNIT) 5000 UNITS: 125 TAB at 08:09

## 2021-09-05 RX ADMIN — INSULIN DETEMIR 10 UNITS: 100 INJECTION, SOLUTION SUBCUTANEOUS at 09:09

## 2021-09-05 RX ADMIN — Medication 1 CAPSULE: at 08:09

## 2021-09-05 RX ADMIN — INSULIN ASPART 4 UNITS: 100 INJECTION, SOLUTION INTRAVENOUS; SUBCUTANEOUS at 12:09

## 2021-09-05 RX ADMIN — SENNOSIDES AND DOCUSATE SODIUM 1 TABLET: 8.6; 5 TABLET ORAL at 08:09

## 2021-09-05 RX ADMIN — ACETAMINOPHEN 1000 MG: 500 TABLET ORAL at 02:09

## 2021-09-05 RX ADMIN — CARVEDILOL 6.25 MG: 6.25 TABLET, FILM COATED ORAL at 08:09

## 2021-09-05 RX ADMIN — DONEPEZIL HYDROCHLORIDE 5 MG: 5 TABLET, FILM COATED ORAL at 08:09

## 2021-09-05 RX ADMIN — PANTOPRAZOLE SODIUM 40 MG: 40 TABLET, DELAYED RELEASE ORAL at 09:09

## 2021-09-05 RX ADMIN — ACETAMINOPHEN 1000 MG: 500 TABLET ORAL at 09:09

## 2021-09-05 RX ADMIN — LACTOBACILLUS ACIDOPHILUS / LACTOBACILLUS BULGARICUS 1 EACH: 100 MILLION CFU STRENGTH GRANULES at 09:09

## 2021-09-05 RX ADMIN — LOSARTAN POTASSIUM 100 MG: 50 TABLET, FILM COATED ORAL at 08:09

## 2021-09-05 RX ADMIN — PANTOPRAZOLE SODIUM 40 MG: 40 TABLET, DELAYED RELEASE ORAL at 08:09

## 2021-09-05 RX ADMIN — LACTOBACILLUS ACIDOPHILUS / LACTOBACILLUS BULGARICUS 1 EACH: 100 MILLION CFU STRENGTH GRANULES at 08:09

## 2021-09-05 RX ADMIN — INSULIN ASPART 1 UNITS: 100 INJECTION, SOLUTION INTRAVENOUS; SUBCUTANEOUS at 09:09

## 2021-09-05 RX ADMIN — SERTRALINE HYDROCHLORIDE 200 MG: 100 TABLET ORAL at 08:09

## 2021-09-06 LAB
ALBUMIN SERPL BCP-MCNC: 3.3 G/DL (ref 3.5–5.2)
ALP SERPL-CCNC: 129 U/L (ref 55–135)
ALT SERPL W/O P-5'-P-CCNC: 12 U/L (ref 10–44)
ANION GAP SERPL CALC-SCNC: 9 MMOL/L (ref 8–16)
ANISOCYTOSIS BLD QL SMEAR: SLIGHT
AST SERPL-CCNC: 16 U/L (ref 10–40)
BASOPHILS # BLD AUTO: 0.03 K/UL (ref 0–0.2)
BASOPHILS NFR BLD: 0.6 % (ref 0–1.9)
BILIRUB SERPL-MCNC: 0.7 MG/DL (ref 0.1–1)
BUN SERPL-MCNC: 20 MG/DL (ref 8–23)
CALCIUM SERPL-MCNC: 9.3 MG/DL (ref 8.7–10.5)
CHLORIDE SERPL-SCNC: 109 MMOL/L (ref 95–110)
CO2 SERPL-SCNC: 22 MMOL/L (ref 23–29)
CREAT SERPL-MCNC: 1.1 MG/DL (ref 0.5–1.4)
DIFFERENTIAL METHOD: ABNORMAL
EOSINOPHIL # BLD AUTO: 0.1 K/UL (ref 0–0.5)
EOSINOPHIL NFR BLD: 1.7 % (ref 0–8)
ERYTHROCYTE [DISTWIDTH] IN BLOOD BY AUTOMATED COUNT: 16.8 % (ref 11.5–14.5)
EST. GFR  (AFRICAN AMERICAN): >60 ML/MIN/1.73 M^2
EST. GFR  (NON AFRICAN AMERICAN): >60 ML/MIN/1.73 M^2
GLUCOSE SERPL-MCNC: 91 MG/DL (ref 70–110)
HCT VFR BLD AUTO: 31.4 % (ref 40–54)
HGB BLD-MCNC: 10.1 G/DL (ref 14–18)
HYPOCHROMIA BLD QL SMEAR: ABNORMAL
IMM GRANULOCYTES # BLD AUTO: 0.02 K/UL (ref 0–0.04)
IMM GRANULOCYTES NFR BLD AUTO: 0.4 % (ref 0–0.5)
LYMPHOCYTES # BLD AUTO: 1.4 K/UL (ref 1–4.8)
LYMPHOCYTES NFR BLD: 28.4 % (ref 18–48)
MAGNESIUM SERPL-MCNC: 1.7 MG/DL (ref 1.6–2.6)
MCH RBC QN AUTO: 29.1 PG (ref 27–31)
MCHC RBC AUTO-ENTMCNC: 32.2 G/DL (ref 32–36)
MCV RBC AUTO: 91 FL (ref 82–98)
MONOCYTES # BLD AUTO: 0.5 K/UL (ref 0.3–1)
MONOCYTES NFR BLD: 10.7 % (ref 4–15)
NEUTROPHILS # BLD AUTO: 2.8 K/UL (ref 1.8–7.7)
NEUTROPHILS NFR BLD: 58.2 % (ref 38–73)
NRBC BLD-RTO: 0 /100 WBC
OVALOCYTES BLD QL SMEAR: ABNORMAL
PLATELET # BLD AUTO: 186 K/UL (ref 150–450)
PLATELET BLD QL SMEAR: ABNORMAL
PMV BLD AUTO: 9.4 FL (ref 9.2–12.9)
POCT GLUCOSE: 100 MG/DL (ref 70–110)
POCT GLUCOSE: 128 MG/DL (ref 70–110)
POCT GLUCOSE: 128 MG/DL (ref 70–110)
POCT GLUCOSE: 139 MG/DL (ref 70–110)
POIKILOCYTOSIS BLD QL SMEAR: SLIGHT
POTASSIUM SERPL-SCNC: 3.9 MMOL/L (ref 3.5–5.1)
PROT SERPL-MCNC: 6 G/DL (ref 6–8.4)
RBC # BLD AUTO: 3.47 M/UL (ref 4.6–6.2)
SODIUM SERPL-SCNC: 140 MMOL/L (ref 136–145)
WBC # BLD AUTO: 4.76 K/UL (ref 3.9–12.7)

## 2021-09-06 PROCEDURE — 96374 THER/PROPH/DIAG INJ IV PUSH: CPT

## 2021-09-06 PROCEDURE — 80053 COMPREHEN METABOLIC PANEL: CPT | Performed by: PHYSICIAN ASSISTANT

## 2021-09-06 PROCEDURE — 25000003 PHARM REV CODE 250: Performed by: PHYSICIAN ASSISTANT

## 2021-09-06 PROCEDURE — 99225 PR SUBSEQUENT OBSERVATION CARE,LEVEL II: CPT | Mod: ,,, | Performed by: NURSE PRACTITIONER

## 2021-09-06 PROCEDURE — 63600175 PHARM REV CODE 636 W HCPCS: Performed by: NURSE PRACTITIONER

## 2021-09-06 PROCEDURE — 25000003 PHARM REV CODE 250: Performed by: NURSE PRACTITIONER

## 2021-09-06 PROCEDURE — 96372 THER/PROPH/DIAG INJ SC/IM: CPT | Mod: 59

## 2021-09-06 PROCEDURE — G0378 HOSPITAL OBSERVATION PER HR: HCPCS

## 2021-09-06 PROCEDURE — 36415 COLL VENOUS BLD VENIPUNCTURE: CPT | Performed by: PHYSICIAN ASSISTANT

## 2021-09-06 PROCEDURE — 99225 PR SUBSEQUENT OBSERVATION CARE,LEVEL II: ICD-10-PCS | Mod: ,,, | Performed by: NURSE PRACTITIONER

## 2021-09-06 PROCEDURE — 25000003 PHARM REV CODE 250: Performed by: INTERNAL MEDICINE

## 2021-09-06 PROCEDURE — 83735 ASSAY OF MAGNESIUM: CPT | Performed by: PHYSICIAN ASSISTANT

## 2021-09-06 PROCEDURE — 85025 COMPLETE CBC W/AUTO DIFF WBC: CPT | Performed by: PHYSICIAN ASSISTANT

## 2021-09-06 RX ORDER — MAGNESIUM SULFATE HEPTAHYDRATE 40 MG/ML
2 INJECTION, SOLUTION INTRAVENOUS ONCE
Status: COMPLETED | OUTPATIENT
Start: 2021-09-06 | End: 2021-09-06

## 2021-09-06 RX ORDER — HYDRALAZINE HYDROCHLORIDE 10 MG/1
10 TABLET, FILM COATED ORAL EVERY 8 HOURS PRN
Status: DISCONTINUED | OUTPATIENT
Start: 2021-09-06 | End: 2021-09-15 | Stop reason: HOSPADM

## 2021-09-06 RX ORDER — POTASSIUM CHLORIDE 750 MG/1
20 CAPSULE, EXTENDED RELEASE ORAL ONCE
Status: COMPLETED | OUTPATIENT
Start: 2021-09-06 | End: 2021-09-06

## 2021-09-06 RX ADMIN — ACETAMINOPHEN 1000 MG: 500 TABLET ORAL at 05:09

## 2021-09-06 RX ADMIN — OXYBUTYNIN CHLORIDE 5 MG: 5 TABLET, EXTENDED RELEASE ORAL at 10:09

## 2021-09-06 RX ADMIN — OXYCODONE 5 MG: 5 TABLET ORAL at 03:09

## 2021-09-06 RX ADMIN — INSULIN ASPART 4 UNITS: 100 INJECTION, SOLUTION INTRAVENOUS; SUBCUTANEOUS at 05:09

## 2021-09-06 RX ADMIN — LACTOBACILLUS ACIDOPHILUS / LACTOBACILLUS BULGARICUS 1 EACH: 100 MILLION CFU STRENGTH GRANULES at 09:09

## 2021-09-06 RX ADMIN — INSULIN ASPART 4 UNITS: 100 INJECTION, SOLUTION INTRAVENOUS; SUBCUTANEOUS at 12:09

## 2021-09-06 RX ADMIN — INSULIN DETEMIR 10 UNITS: 100 INJECTION, SOLUTION SUBCUTANEOUS at 09:09

## 2021-09-06 RX ADMIN — PANTOPRAZOLE SODIUM 40 MG: 40 TABLET, DELAYED RELEASE ORAL at 09:09

## 2021-09-06 RX ADMIN — MAGNESIUM SULFATE 2 G: 2 INJECTION INTRAVENOUS at 11:09

## 2021-09-06 RX ADMIN — ACETAMINOPHEN 1000 MG: 500 TABLET ORAL at 01:09

## 2021-09-06 RX ADMIN — SENNOSIDES AND DOCUSATE SODIUM 1 TABLET: 8.6; 5 TABLET ORAL at 10:09

## 2021-09-06 RX ADMIN — INSULIN ASPART 4 UNITS: 100 INJECTION, SOLUTION INTRAVENOUS; SUBCUTANEOUS at 08:09

## 2021-09-06 RX ADMIN — CHOLECALCIFEROL TAB 125 MCG (5000 UNIT) 5000 UNITS: 125 TAB at 10:09

## 2021-09-06 RX ADMIN — LOSARTAN POTASSIUM 100 MG: 50 TABLET, FILM COATED ORAL at 10:09

## 2021-09-06 RX ADMIN — PANTOPRAZOLE SODIUM 40 MG: 40 TABLET, DELAYED RELEASE ORAL at 11:09

## 2021-09-06 RX ADMIN — DONEPEZIL HYDROCHLORIDE 5 MG: 5 TABLET, FILM COATED ORAL at 09:09

## 2021-09-06 RX ADMIN — CARVEDILOL 6.25 MG: 6.25 TABLET, FILM COATED ORAL at 10:09

## 2021-09-06 RX ADMIN — SENNOSIDES AND DOCUSATE SODIUM 1 TABLET: 8.6; 5 TABLET ORAL at 09:09

## 2021-09-06 RX ADMIN — CARVEDILOL 6.25 MG: 6.25 TABLET, FILM COATED ORAL at 09:09

## 2021-09-06 RX ADMIN — POTASSIUM CHLORIDE 20 MEQ: 10 CAPSULE, COATED, EXTENDED RELEASE ORAL at 11:09

## 2021-09-06 RX ADMIN — SERTRALINE HYDROCHLORIDE 200 MG: 100 TABLET ORAL at 10:09

## 2021-09-06 RX ADMIN — ACETAMINOPHEN 1000 MG: 500 TABLET ORAL at 09:09

## 2021-09-06 RX ADMIN — ATORVASTATIN CALCIUM 40 MG: 20 TABLET, FILM COATED ORAL at 10:09

## 2021-09-07 LAB
POCT GLUCOSE: 122 MG/DL (ref 70–110)
POCT GLUCOSE: 126 MG/DL (ref 70–110)
POCT GLUCOSE: 130 MG/DL (ref 70–110)
POCT GLUCOSE: 96 MG/DL (ref 70–110)

## 2021-09-07 PROCEDURE — 25000003 PHARM REV CODE 250: Performed by: NURSE PRACTITIONER

## 2021-09-07 PROCEDURE — 96372 THER/PROPH/DIAG INJ SC/IM: CPT | Mod: 59

## 2021-09-07 PROCEDURE — G0378 HOSPITAL OBSERVATION PER HR: HCPCS

## 2021-09-07 PROCEDURE — 97530 THERAPEUTIC ACTIVITIES: CPT

## 2021-09-07 PROCEDURE — 25000003 PHARM REV CODE 250: Performed by: INTERNAL MEDICINE

## 2021-09-07 PROCEDURE — 99225 PR SUBSEQUENT OBSERVATION CARE,LEVEL II: ICD-10-PCS | Mod: ,,, | Performed by: NURSE PRACTITIONER

## 2021-09-07 PROCEDURE — 25000003 PHARM REV CODE 250: Performed by: PHYSICIAN ASSISTANT

## 2021-09-07 PROCEDURE — 97535 SELF CARE MNGMENT TRAINING: CPT | Mod: 59

## 2021-09-07 PROCEDURE — 99225 PR SUBSEQUENT OBSERVATION CARE,LEVEL II: CPT | Mod: ,,, | Performed by: NURSE PRACTITIONER

## 2021-09-07 RX ADMIN — CHOLECALCIFEROL TAB 125 MCG (5000 UNIT) 5000 UNITS: 125 TAB at 09:09

## 2021-09-07 RX ADMIN — SENNOSIDES AND DOCUSATE SODIUM 1 TABLET: 8.6; 5 TABLET ORAL at 09:09

## 2021-09-07 RX ADMIN — PANTOPRAZOLE SODIUM 40 MG: 40 TABLET, DELAYED RELEASE ORAL at 09:09

## 2021-09-07 RX ADMIN — CARVEDILOL 6.25 MG: 6.25 TABLET, FILM COATED ORAL at 09:09

## 2021-09-07 RX ADMIN — ACETAMINOPHEN 1000 MG: 500 TABLET ORAL at 09:09

## 2021-09-07 RX ADMIN — LOSARTAN POTASSIUM 100 MG: 50 TABLET, FILM COATED ORAL at 09:09

## 2021-09-07 RX ADMIN — ACETAMINOPHEN 1000 MG: 500 TABLET ORAL at 05:09

## 2021-09-07 RX ADMIN — ACETAMINOPHEN 1000 MG: 500 TABLET ORAL at 01:09

## 2021-09-07 RX ADMIN — Medication 1 CAPSULE: at 09:09

## 2021-09-07 RX ADMIN — INSULIN ASPART 4 UNITS: 100 INJECTION, SOLUTION INTRAVENOUS; SUBCUTANEOUS at 08:09

## 2021-09-07 RX ADMIN — LACTOBACILLUS ACIDOPHILUS / LACTOBACILLUS BULGARICUS 1 EACH: 100 MILLION CFU STRENGTH GRANULES at 09:09

## 2021-09-07 RX ADMIN — DONEPEZIL HYDROCHLORIDE 5 MG: 5 TABLET, FILM COATED ORAL at 09:09

## 2021-09-07 RX ADMIN — ATORVASTATIN CALCIUM 40 MG: 20 TABLET, FILM COATED ORAL at 09:09

## 2021-09-07 RX ADMIN — INSULIN DETEMIR 10 UNITS: 100 INJECTION, SOLUTION SUBCUTANEOUS at 09:09

## 2021-09-07 RX ADMIN — SERTRALINE HYDROCHLORIDE 200 MG: 100 TABLET ORAL at 09:09

## 2021-09-07 RX ADMIN — OXYBUTYNIN CHLORIDE 5 MG: 5 TABLET, EXTENDED RELEASE ORAL at 09:09

## 2021-09-08 ENCOUNTER — TELEPHONE (OUTPATIENT)
Dept: NEUROLOGY | Facility: CLINIC | Age: 61
End: 2021-09-08

## 2021-09-08 LAB
ALBUMIN SERPL BCP-MCNC: 3.5 G/DL (ref 3.5–5.2)
ALP SERPL-CCNC: 135 U/L (ref 55–135)
ALT SERPL W/O P-5'-P-CCNC: 13 U/L (ref 10–44)
ANION GAP SERPL CALC-SCNC: 9 MMOL/L (ref 8–16)
AST SERPL-CCNC: 15 U/L (ref 10–40)
BASOPHILS # BLD AUTO: 0.02 K/UL (ref 0–0.2)
BASOPHILS NFR BLD: 0.4 % (ref 0–1.9)
BILIRUB SERPL-MCNC: 0.7 MG/DL (ref 0.1–1)
BUN SERPL-MCNC: 23 MG/DL (ref 8–23)
CALCIUM SERPL-MCNC: 9.4 MG/DL (ref 8.7–10.5)
CHLORIDE SERPL-SCNC: 112 MMOL/L (ref 95–110)
CO2 SERPL-SCNC: 22 MMOL/L (ref 23–29)
CREAT SERPL-MCNC: 1.1 MG/DL (ref 0.5–1.4)
DIFFERENTIAL METHOD: ABNORMAL
EOSINOPHIL # BLD AUTO: 0.1 K/UL (ref 0–0.5)
EOSINOPHIL NFR BLD: 2 % (ref 0–8)
ERYTHROCYTE [DISTWIDTH] IN BLOOD BY AUTOMATED COUNT: 16.9 % (ref 11.5–14.5)
EST. GFR  (AFRICAN AMERICAN): >60 ML/MIN/1.73 M^2
EST. GFR  (NON AFRICAN AMERICAN): >60 ML/MIN/1.73 M^2
GLUCOSE SERPL-MCNC: 97 MG/DL (ref 70–110)
HCT VFR BLD AUTO: 31.9 % (ref 40–54)
HGB BLD-MCNC: 10.4 G/DL (ref 14–18)
IMM GRANULOCYTES # BLD AUTO: 0.02 K/UL (ref 0–0.04)
IMM GRANULOCYTES NFR BLD AUTO: 0.4 % (ref 0–0.5)
LYMPHOCYTES # BLD AUTO: 1.2 K/UL (ref 1–4.8)
LYMPHOCYTES NFR BLD: 24.1 % (ref 18–48)
MAGNESIUM SERPL-MCNC: 1.9 MG/DL (ref 1.6–2.6)
MCH RBC QN AUTO: 29.7 PG (ref 27–31)
MCHC RBC AUTO-ENTMCNC: 32.6 G/DL (ref 32–36)
MCV RBC AUTO: 91 FL (ref 82–98)
MONOCYTES # BLD AUTO: 0.6 K/UL (ref 0.3–1)
MONOCYTES NFR BLD: 11.6 % (ref 4–15)
NEUTROPHILS # BLD AUTO: 3 K/UL (ref 1.8–7.7)
NEUTROPHILS NFR BLD: 61.5 % (ref 38–73)
NRBC BLD-RTO: 0 /100 WBC
PLATELET # BLD AUTO: 174 K/UL (ref 150–450)
PMV BLD AUTO: 9.6 FL (ref 9.2–12.9)
POCT GLUCOSE: 103 MG/DL (ref 70–110)
POCT GLUCOSE: 106 MG/DL (ref 70–110)
POCT GLUCOSE: 182 MG/DL (ref 70–110)
POCT GLUCOSE: 269 MG/DL (ref 70–110)
POTASSIUM SERPL-SCNC: 4 MMOL/L (ref 3.5–5.1)
PROT SERPL-MCNC: 6.2 G/DL (ref 6–8.4)
RBC # BLD AUTO: 3.5 M/UL (ref 4.6–6.2)
SODIUM SERPL-SCNC: 143 MMOL/L (ref 136–145)
WBC # BLD AUTO: 4.93 K/UL (ref 3.9–12.7)

## 2021-09-08 PROCEDURE — 85025 COMPLETE CBC W/AUTO DIFF WBC: CPT | Performed by: PHYSICIAN ASSISTANT

## 2021-09-08 PROCEDURE — 36415 COLL VENOUS BLD VENIPUNCTURE: CPT | Performed by: PHYSICIAN ASSISTANT

## 2021-09-08 PROCEDURE — 96372 THER/PROPH/DIAG INJ SC/IM: CPT | Mod: 59

## 2021-09-08 PROCEDURE — 25000003 PHARM REV CODE 250: Performed by: PHYSICIAN ASSISTANT

## 2021-09-08 PROCEDURE — 83735 ASSAY OF MAGNESIUM: CPT | Performed by: PHYSICIAN ASSISTANT

## 2021-09-08 PROCEDURE — 25000003 PHARM REV CODE 250: Performed by: INTERNAL MEDICINE

## 2021-09-08 PROCEDURE — 80053 COMPREHEN METABOLIC PANEL: CPT | Performed by: PHYSICIAN ASSISTANT

## 2021-09-08 PROCEDURE — 99226 PR SUBSEQUENT OBSERVATION CARE,LEVEL III: ICD-10-PCS | Mod: ,,, | Performed by: NURSE PRACTITIONER

## 2021-09-08 PROCEDURE — G0378 HOSPITAL OBSERVATION PER HR: HCPCS

## 2021-09-08 PROCEDURE — 25000003 PHARM REV CODE 250: Performed by: NURSE PRACTITIONER

## 2021-09-08 PROCEDURE — 99226 PR SUBSEQUENT OBSERVATION CARE,LEVEL III: CPT | Mod: ,,, | Performed by: NURSE PRACTITIONER

## 2021-09-08 RX ORDER — INSULIN ASPART 100 [IU]/ML
4 INJECTION, SOLUTION INTRAVENOUS; SUBCUTANEOUS
Status: DISCONTINUED | OUTPATIENT
Start: 2021-09-08 | End: 2021-09-15 | Stop reason: HOSPADM

## 2021-09-08 RX ADMIN — LOSARTAN POTASSIUM 100 MG: 50 TABLET, FILM COATED ORAL at 09:09

## 2021-09-08 RX ADMIN — SENNOSIDES AND DOCUSATE SODIUM 1 TABLET: 8.6; 5 TABLET ORAL at 09:09

## 2021-09-08 RX ADMIN — SENNOSIDES AND DOCUSATE SODIUM 1 TABLET: 8.6; 5 TABLET ORAL at 08:09

## 2021-09-08 RX ADMIN — ACETAMINOPHEN 1000 MG: 500 TABLET ORAL at 01:09

## 2021-09-08 RX ADMIN — CARVEDILOL 6.25 MG: 6.25 TABLET, FILM COATED ORAL at 09:09

## 2021-09-08 RX ADMIN — CHOLECALCIFEROL TAB 125 MCG (5000 UNIT) 5000 UNITS: 125 TAB at 09:09

## 2021-09-08 RX ADMIN — INSULIN DETEMIR 10 UNITS: 100 INJECTION, SOLUTION SUBCUTANEOUS at 08:09

## 2021-09-08 RX ADMIN — INSULIN ASPART 6 UNITS: 100 INJECTION, SOLUTION INTRAVENOUS; SUBCUTANEOUS at 12:09

## 2021-09-08 RX ADMIN — LACTOBACILLUS ACIDOPHILUS / LACTOBACILLUS BULGARICUS 1 EACH: 100 MILLION CFU STRENGTH GRANULES at 09:09

## 2021-09-08 RX ADMIN — SERTRALINE HYDROCHLORIDE 200 MG: 100 TABLET ORAL at 09:09

## 2021-09-08 RX ADMIN — ACETAMINOPHEN 1000 MG: 500 TABLET ORAL at 05:09

## 2021-09-08 RX ADMIN — ATORVASTATIN CALCIUM 40 MG: 20 TABLET, FILM COATED ORAL at 09:09

## 2021-09-08 RX ADMIN — PANTOPRAZOLE SODIUM 40 MG: 40 TABLET, DELAYED RELEASE ORAL at 08:09

## 2021-09-08 RX ADMIN — DONEPEZIL HYDROCHLORIDE 5 MG: 5 TABLET, FILM COATED ORAL at 08:09

## 2021-09-08 RX ADMIN — CARVEDILOL 6.25 MG: 6.25 TABLET, FILM COATED ORAL at 08:09

## 2021-09-08 RX ADMIN — Medication 1 CAPSULE: at 09:09

## 2021-09-08 RX ADMIN — OXYBUTYNIN CHLORIDE 5 MG: 5 TABLET, EXTENDED RELEASE ORAL at 09:09

## 2021-09-08 RX ADMIN — ACETAMINOPHEN 1000 MG: 500 TABLET ORAL at 09:09

## 2021-09-08 RX ADMIN — PANTOPRAZOLE SODIUM 40 MG: 40 TABLET, DELAYED RELEASE ORAL at 09:09

## 2021-09-09 LAB
POCT GLUCOSE: 112 MG/DL (ref 70–110)
POCT GLUCOSE: 173 MG/DL (ref 70–110)
POCT GLUCOSE: 201 MG/DL (ref 70–110)

## 2021-09-09 PROCEDURE — G0378 HOSPITAL OBSERVATION PER HR: HCPCS

## 2021-09-09 PROCEDURE — 25000003 PHARM REV CODE 250: Performed by: PHYSICIAN ASSISTANT

## 2021-09-09 PROCEDURE — 97535 SELF CARE MNGMENT TRAINING: CPT

## 2021-09-09 PROCEDURE — 25000003 PHARM REV CODE 250: Performed by: INTERNAL MEDICINE

## 2021-09-09 PROCEDURE — 25000003 PHARM REV CODE 250: Performed by: NURSE PRACTITIONER

## 2021-09-09 PROCEDURE — 99226 PR SUBSEQUENT OBSERVATION CARE,LEVEL III: ICD-10-PCS | Mod: ,,, | Performed by: PHYSICIAN ASSISTANT

## 2021-09-09 PROCEDURE — 96372 THER/PROPH/DIAG INJ SC/IM: CPT | Mod: 59

## 2021-09-09 PROCEDURE — 63600175 PHARM REV CODE 636 W HCPCS: Performed by: NURSE PRACTITIONER

## 2021-09-09 PROCEDURE — 97116 GAIT TRAINING THERAPY: CPT | Mod: CQ

## 2021-09-09 PROCEDURE — 99226 PR SUBSEQUENT OBSERVATION CARE,LEVEL III: CPT | Mod: ,,, | Performed by: PHYSICIAN ASSISTANT

## 2021-09-09 RX ADMIN — OXYBUTYNIN CHLORIDE 5 MG: 5 TABLET, EXTENDED RELEASE ORAL at 08:09

## 2021-09-09 RX ADMIN — ACETAMINOPHEN 1000 MG: 500 TABLET ORAL at 09:09

## 2021-09-09 RX ADMIN — PANTOPRAZOLE SODIUM 40 MG: 40 TABLET, DELAYED RELEASE ORAL at 09:09

## 2021-09-09 RX ADMIN — CARVEDILOL 6.25 MG: 6.25 TABLET, FILM COATED ORAL at 08:09

## 2021-09-09 RX ADMIN — LACTOBACILLUS ACIDOPHILUS / LACTOBACILLUS BULGARICUS 1 EACH: 100 MILLION CFU STRENGTH GRANULES at 08:09

## 2021-09-09 RX ADMIN — LACTOBACILLUS ACIDOPHILUS / LACTOBACILLUS BULGARICUS 1 EACH: 100 MILLION CFU STRENGTH GRANULES at 09:09

## 2021-09-09 RX ADMIN — SENNOSIDES AND DOCUSATE SODIUM 1 TABLET: 8.6; 5 TABLET ORAL at 09:09

## 2021-09-09 RX ADMIN — OXYCODONE 5 MG: 5 TABLET ORAL at 09:09

## 2021-09-09 RX ADMIN — ACETAMINOPHEN 1000 MG: 500 TABLET ORAL at 02:09

## 2021-09-09 RX ADMIN — LOSARTAN POTASSIUM 100 MG: 50 TABLET, FILM COATED ORAL at 08:09

## 2021-09-09 RX ADMIN — DONEPEZIL HYDROCHLORIDE 5 MG: 5 TABLET, FILM COATED ORAL at 09:09

## 2021-09-09 RX ADMIN — INSULIN ASPART 4 UNITS: 100 INJECTION, SOLUTION INTRAVENOUS; SUBCUTANEOUS at 06:09

## 2021-09-09 RX ADMIN — SENNOSIDES AND DOCUSATE SODIUM 1 TABLET: 8.6; 5 TABLET ORAL at 08:09

## 2021-09-09 RX ADMIN — CARVEDILOL 6.25 MG: 6.25 TABLET, FILM COATED ORAL at 09:09

## 2021-09-09 RX ADMIN — INSULIN DETEMIR 10 UNITS: 100 INJECTION, SOLUTION SUBCUTANEOUS at 09:09

## 2021-09-09 RX ADMIN — CHOLECALCIFEROL TAB 125 MCG (5000 UNIT) 5000 UNITS: 125 TAB at 08:09

## 2021-09-09 RX ADMIN — ATORVASTATIN CALCIUM 40 MG: 20 TABLET, FILM COATED ORAL at 08:09

## 2021-09-09 RX ADMIN — Medication 1 CAPSULE: at 08:09

## 2021-09-09 RX ADMIN — ACETAMINOPHEN 1000 MG: 500 TABLET ORAL at 05:09

## 2021-09-09 RX ADMIN — SERTRALINE HYDROCHLORIDE 200 MG: 100 TABLET ORAL at 08:09

## 2021-09-09 RX ADMIN — PANTOPRAZOLE SODIUM 40 MG: 40 TABLET, DELAYED RELEASE ORAL at 08:09

## 2021-09-10 LAB
ALBUMIN SERPL BCP-MCNC: 3.3 G/DL (ref 3.5–5.2)
ALP SERPL-CCNC: 116 U/L (ref 55–135)
ALT SERPL W/O P-5'-P-CCNC: 13 U/L (ref 10–44)
ANION GAP SERPL CALC-SCNC: 9 MMOL/L (ref 8–16)
AST SERPL-CCNC: 17 U/L (ref 10–40)
BASOPHILS # BLD AUTO: 0.02 K/UL (ref 0–0.2)
BASOPHILS NFR BLD: 0.4 % (ref 0–1.9)
BILIRUB SERPL-MCNC: 0.5 MG/DL (ref 0.1–1)
BUN SERPL-MCNC: 27 MG/DL (ref 8–23)
CALCIUM SERPL-MCNC: 8.7 MG/DL (ref 8.7–10.5)
CHLORIDE SERPL-SCNC: 113 MMOL/L (ref 95–110)
CO2 SERPL-SCNC: 21 MMOL/L (ref 23–29)
CREAT SERPL-MCNC: 1 MG/DL (ref 0.5–1.4)
DIFFERENTIAL METHOD: ABNORMAL
EOSINOPHIL # BLD AUTO: 0.1 K/UL (ref 0–0.5)
EOSINOPHIL NFR BLD: 2.1 % (ref 0–8)
ERYTHROCYTE [DISTWIDTH] IN BLOOD BY AUTOMATED COUNT: 16.9 % (ref 11.5–14.5)
EST. GFR  (AFRICAN AMERICAN): >60 ML/MIN/1.73 M^2
EST. GFR  (NON AFRICAN AMERICAN): >60 ML/MIN/1.73 M^2
GLUCOSE SERPL-MCNC: 108 MG/DL (ref 70–110)
HCT VFR BLD AUTO: 31.9 % (ref 40–54)
HGB BLD-MCNC: 10.2 G/DL (ref 14–18)
IMM GRANULOCYTES # BLD AUTO: 0.02 K/UL (ref 0–0.04)
IMM GRANULOCYTES NFR BLD AUTO: 0.4 % (ref 0–0.5)
LYMPHOCYTES # BLD AUTO: 1.1 K/UL (ref 1–4.8)
LYMPHOCYTES NFR BLD: 23.7 % (ref 18–48)
MAGNESIUM SERPL-MCNC: 2 MG/DL (ref 1.6–2.6)
MCH RBC QN AUTO: 29.7 PG (ref 27–31)
MCHC RBC AUTO-ENTMCNC: 32 G/DL (ref 32–36)
MCV RBC AUTO: 93 FL (ref 82–98)
MONOCYTES # BLD AUTO: 0.5 K/UL (ref 0.3–1)
MONOCYTES NFR BLD: 11 % (ref 4–15)
NEUTROPHILS # BLD AUTO: 3 K/UL (ref 1.8–7.7)
NEUTROPHILS NFR BLD: 62.4 % (ref 38–73)
NRBC BLD-RTO: 0 /100 WBC
PLATELET # BLD AUTO: 155 K/UL (ref 150–450)
PMV BLD AUTO: 9.5 FL (ref 9.2–12.9)
POCT GLUCOSE: 117 MG/DL (ref 70–110)
POCT GLUCOSE: 120 MG/DL (ref 70–110)
POCT GLUCOSE: 251 MG/DL (ref 70–110)
POCT GLUCOSE: 81 MG/DL (ref 70–110)
POCT GLUCOSE: 90 MG/DL (ref 70–110)
POTASSIUM SERPL-SCNC: 3.9 MMOL/L (ref 3.5–5.1)
PROT SERPL-MCNC: 6.1 G/DL (ref 6–8.4)
RBC # BLD AUTO: 3.43 M/UL (ref 4.6–6.2)
SODIUM SERPL-SCNC: 143 MMOL/L (ref 136–145)
WBC # BLD AUTO: 4.82 K/UL (ref 3.9–12.7)

## 2021-09-10 PROCEDURE — G0378 HOSPITAL OBSERVATION PER HR: HCPCS

## 2021-09-10 PROCEDURE — 25000003 PHARM REV CODE 250: Performed by: INTERNAL MEDICINE

## 2021-09-10 PROCEDURE — 97530 THERAPEUTIC ACTIVITIES: CPT | Mod: CQ

## 2021-09-10 PROCEDURE — 85025 COMPLETE CBC W/AUTO DIFF WBC: CPT | Performed by: PHYSICIAN ASSISTANT

## 2021-09-10 PROCEDURE — 83735 ASSAY OF MAGNESIUM: CPT | Performed by: PHYSICIAN ASSISTANT

## 2021-09-10 PROCEDURE — 99226 PR SUBSEQUENT OBSERVATION CARE,LEVEL III: CPT | Mod: ,,, | Performed by: PHYSICIAN ASSISTANT

## 2021-09-10 PROCEDURE — 25000003 PHARM REV CODE 250: Performed by: NURSE PRACTITIONER

## 2021-09-10 PROCEDURE — 36415 COLL VENOUS BLD VENIPUNCTURE: CPT | Performed by: PHYSICIAN ASSISTANT

## 2021-09-10 PROCEDURE — 99226 PR SUBSEQUENT OBSERVATION CARE,LEVEL III: ICD-10-PCS | Mod: ,,, | Performed by: PHYSICIAN ASSISTANT

## 2021-09-10 PROCEDURE — 96372 THER/PROPH/DIAG INJ SC/IM: CPT | Mod: 59

## 2021-09-10 PROCEDURE — 80053 COMPREHEN METABOLIC PANEL: CPT | Performed by: PHYSICIAN ASSISTANT

## 2021-09-10 PROCEDURE — 97530 THERAPEUTIC ACTIVITIES: CPT

## 2021-09-10 PROCEDURE — 25000003 PHARM REV CODE 250: Performed by: PHYSICIAN ASSISTANT

## 2021-09-10 RX ORDER — SENNOSIDES 8.6 MG/1
8.6 TABLET ORAL NIGHTLY
Status: DISCONTINUED | OUTPATIENT
Start: 2021-09-10 | End: 2021-09-15 | Stop reason: HOSPADM

## 2021-09-10 RX ORDER — ACETAMINOPHEN 500 MG
1000 TABLET ORAL EVERY 8 HOURS PRN
Status: DISCONTINUED | OUTPATIENT
Start: 2021-09-10 | End: 2021-09-15 | Stop reason: HOSPADM

## 2021-09-10 RX ADMIN — ACETAMINOPHEN 1000 MG: 500 TABLET ORAL at 05:09

## 2021-09-10 RX ADMIN — SENNOSIDES 8.6 MG: 8.6 TABLET, FILM COATED ORAL at 08:09

## 2021-09-10 RX ADMIN — CARVEDILOL 6.25 MG: 6.25 TABLET, FILM COATED ORAL at 09:09

## 2021-09-10 RX ADMIN — ATORVASTATIN CALCIUM 40 MG: 20 TABLET, FILM COATED ORAL at 08:09

## 2021-09-10 RX ADMIN — LOSARTAN POTASSIUM 100 MG: 50 TABLET, FILM COATED ORAL at 09:09

## 2021-09-10 RX ADMIN — OXYBUTYNIN CHLORIDE 5 MG: 5 TABLET, EXTENDED RELEASE ORAL at 09:09

## 2021-09-10 RX ADMIN — Medication 1 CAPSULE: at 02:09

## 2021-09-10 RX ADMIN — LACTOBACILLUS ACIDOPHILUS / LACTOBACILLUS BULGARICUS 1 EACH: 100 MILLION CFU STRENGTH GRANULES at 09:09

## 2021-09-10 RX ADMIN — INSULIN ASPART 4 UNITS: 100 INJECTION, SOLUTION INTRAVENOUS; SUBCUTANEOUS at 02:09

## 2021-09-10 RX ADMIN — PANTOPRAZOLE SODIUM 40 MG: 40 TABLET, DELAYED RELEASE ORAL at 09:09

## 2021-09-10 RX ADMIN — SERTRALINE HYDROCHLORIDE 200 MG: 100 TABLET ORAL at 09:09

## 2021-09-10 RX ADMIN — PANTOPRAZOLE SODIUM 40 MG: 40 TABLET, DELAYED RELEASE ORAL at 08:09

## 2021-09-10 RX ADMIN — CHOLECALCIFEROL TAB 125 MCG (5000 UNIT) 5000 UNITS: 125 TAB at 09:09

## 2021-09-10 RX ADMIN — LACTOBACILLUS ACIDOPHILUS / LACTOBACILLUS BULGARICUS 1 EACH: 100 MILLION CFU STRENGTH GRANULES at 08:09

## 2021-09-10 RX ADMIN — DONEPEZIL HYDROCHLORIDE 5 MG: 5 TABLET, FILM COATED ORAL at 08:09

## 2021-09-10 RX ADMIN — INSULIN DETEMIR 10 UNITS: 100 INJECTION, SOLUTION SUBCUTANEOUS at 08:09

## 2021-09-10 RX ADMIN — CARVEDILOL 6.25 MG: 6.25 TABLET, FILM COATED ORAL at 08:09

## 2021-09-11 LAB
POCT GLUCOSE: 115 MG/DL (ref 70–110)
POCT GLUCOSE: 130 MG/DL (ref 70–110)
POCT GLUCOSE: 159 MG/DL (ref 70–110)
POCT GLUCOSE: 170 MG/DL (ref 70–110)
POCT GLUCOSE: 258 MG/DL (ref 70–110)

## 2021-09-11 PROCEDURE — 25000003 PHARM REV CODE 250: Performed by: INTERNAL MEDICINE

## 2021-09-11 PROCEDURE — 99226 PR SUBSEQUENT OBSERVATION CARE,LEVEL III: ICD-10-PCS | Mod: ,,, | Performed by: PHYSICIAN ASSISTANT

## 2021-09-11 PROCEDURE — 25000003 PHARM REV CODE 250: Performed by: NURSE PRACTITIONER

## 2021-09-11 PROCEDURE — 25000003 PHARM REV CODE 250: Performed by: PHYSICIAN ASSISTANT

## 2021-09-11 PROCEDURE — 96372 THER/PROPH/DIAG INJ SC/IM: CPT | Mod: 59

## 2021-09-11 PROCEDURE — C9399 UNCLASSIFIED DRUGS OR BIOLOG: HCPCS | Performed by: PHYSICIAN ASSISTANT

## 2021-09-11 PROCEDURE — 99226 PR SUBSEQUENT OBSERVATION CARE,LEVEL III: CPT | Mod: ,,, | Performed by: PHYSICIAN ASSISTANT

## 2021-09-11 PROCEDURE — G0378 HOSPITAL OBSERVATION PER HR: HCPCS

## 2021-09-11 RX ORDER — OMEGA-3-ACID ETHYL ESTERS 1 G/1
1 CAPSULE, LIQUID FILLED ORAL DAILY
Status: DISCONTINUED | OUTPATIENT
Start: 2021-09-11 | End: 2021-09-15 | Stop reason: HOSPADM

## 2021-09-11 RX ADMIN — INSULIN ASPART 4 UNITS: 100 INJECTION, SOLUTION INTRAVENOUS; SUBCUTANEOUS at 12:09

## 2021-09-11 RX ADMIN — Medication 1 CAPSULE: at 09:09

## 2021-09-11 RX ADMIN — DONEPEZIL HYDROCHLORIDE 5 MG: 5 TABLET, FILM COATED ORAL at 09:09

## 2021-09-11 RX ADMIN — LACTOBACILLUS ACIDOPHILUS / LACTOBACILLUS BULGARICUS 1 EACH: 100 MILLION CFU STRENGTH GRANULES at 09:09

## 2021-09-11 RX ADMIN — PANTOPRAZOLE SODIUM 40 MG: 40 TABLET, DELAYED RELEASE ORAL at 09:09

## 2021-09-11 RX ADMIN — INSULIN ASPART 4 UNITS: 100 INJECTION, SOLUTION INTRAVENOUS; SUBCUTANEOUS at 09:09

## 2021-09-11 RX ADMIN — OMEGA-3-ACID ETHYL ESTERS 1 G: 1 CAPSULE, LIQUID FILLED ORAL at 10:09

## 2021-09-11 RX ADMIN — INSULIN ASPART 6 UNITS: 100 INJECTION, SOLUTION INTRAVENOUS; SUBCUTANEOUS at 05:09

## 2021-09-11 RX ADMIN — CARVEDILOL 6.25 MG: 6.25 TABLET, FILM COATED ORAL at 09:09

## 2021-09-11 RX ADMIN — CHOLECALCIFEROL TAB 125 MCG (5000 UNIT) 5000 UNITS: 125 TAB at 09:09

## 2021-09-11 RX ADMIN — OXYBUTYNIN CHLORIDE 5 MG: 5 TABLET, EXTENDED RELEASE ORAL at 09:09

## 2021-09-11 RX ADMIN — INSULIN ASPART 4 UNITS: 100 INJECTION, SOLUTION INTRAVENOUS; SUBCUTANEOUS at 05:09

## 2021-09-11 RX ADMIN — SENNOSIDES 8.6 MG: 8.6 TABLET, FILM COATED ORAL at 09:09

## 2021-09-11 RX ADMIN — SERTRALINE HYDROCHLORIDE 200 MG: 100 TABLET ORAL at 09:09

## 2021-09-11 RX ADMIN — INSULIN DETEMIR 10 UNITS: 100 INJECTION, SOLUTION SUBCUTANEOUS at 09:09

## 2021-09-11 RX ADMIN — INSULIN ASPART 2 UNITS: 100 INJECTION, SOLUTION INTRAVENOUS; SUBCUTANEOUS at 12:09

## 2021-09-11 RX ADMIN — LOSARTAN POTASSIUM 100 MG: 50 TABLET, FILM COATED ORAL at 09:09

## 2021-09-11 RX ADMIN — ATORVASTATIN CALCIUM 40 MG: 20 TABLET, FILM COATED ORAL at 09:09

## 2021-09-11 RX ADMIN — ACETAMINOPHEN 1000 MG: 500 TABLET ORAL at 05:09

## 2021-09-12 LAB
POCT GLUCOSE: 122 MG/DL (ref 70–110)
POCT GLUCOSE: 124 MG/DL (ref 70–110)
POCT GLUCOSE: 154 MG/DL (ref 70–110)
POCT GLUCOSE: 162 MG/DL (ref 70–110)

## 2021-09-12 PROCEDURE — 99226 PR SUBSEQUENT OBSERVATION CARE,LEVEL III: ICD-10-PCS | Mod: ,,, | Performed by: PHYSICIAN ASSISTANT

## 2021-09-12 PROCEDURE — 96372 THER/PROPH/DIAG INJ SC/IM: CPT | Mod: 59

## 2021-09-12 PROCEDURE — 25000003 PHARM REV CODE 250: Performed by: PHYSICIAN ASSISTANT

## 2021-09-12 PROCEDURE — 99226 PR SUBSEQUENT OBSERVATION CARE,LEVEL III: CPT | Mod: ,,, | Performed by: PHYSICIAN ASSISTANT

## 2021-09-12 PROCEDURE — G0378 HOSPITAL OBSERVATION PER HR: HCPCS

## 2021-09-12 PROCEDURE — 25000003 PHARM REV CODE 250: Performed by: INTERNAL MEDICINE

## 2021-09-12 PROCEDURE — 25000003 PHARM REV CODE 250: Performed by: NURSE PRACTITIONER

## 2021-09-12 RX ORDER — GUAIFENESIN 100 MG/5ML
200 SOLUTION ORAL EVERY 4 HOURS PRN
Status: DISCONTINUED | OUTPATIENT
Start: 2021-09-12 | End: 2021-09-12

## 2021-09-12 RX ADMIN — INSULIN ASPART 2 UNITS: 100 INJECTION, SOLUTION INTRAVENOUS; SUBCUTANEOUS at 12:09

## 2021-09-12 RX ADMIN — INSULIN ASPART 4 UNITS: 100 INJECTION, SOLUTION INTRAVENOUS; SUBCUTANEOUS at 05:09

## 2021-09-12 RX ADMIN — INSULIN ASPART 4 UNITS: 100 INJECTION, SOLUTION INTRAVENOUS; SUBCUTANEOUS at 09:09

## 2021-09-12 RX ADMIN — LACTOBACILLUS ACIDOPHILUS / LACTOBACILLUS BULGARICUS 1 EACH: 100 MILLION CFU STRENGTH GRANULES at 09:09

## 2021-09-12 RX ADMIN — INSULIN DETEMIR 10 UNITS: 100 INJECTION, SOLUTION SUBCUTANEOUS at 09:09

## 2021-09-12 RX ADMIN — OXYBUTYNIN CHLORIDE 5 MG: 5 TABLET, EXTENDED RELEASE ORAL at 09:09

## 2021-09-12 RX ADMIN — OXYCODONE 5 MG: 5 TABLET ORAL at 09:09

## 2021-09-12 RX ADMIN — PANTOPRAZOLE SODIUM 40 MG: 40 TABLET, DELAYED RELEASE ORAL at 09:09

## 2021-09-12 RX ADMIN — INSULIN ASPART 2 UNITS: 100 INJECTION, SOLUTION INTRAVENOUS; SUBCUTANEOUS at 05:09

## 2021-09-12 RX ADMIN — CARVEDILOL 6.25 MG: 6.25 TABLET, FILM COATED ORAL at 09:09

## 2021-09-12 RX ADMIN — SENNOSIDES 8.6 MG: 8.6 TABLET, FILM COATED ORAL at 09:09

## 2021-09-12 RX ADMIN — CHOLECALCIFEROL TAB 125 MCG (5000 UNIT) 5000 UNITS: 125 TAB at 09:09

## 2021-09-12 RX ADMIN — Medication 1 CAPSULE: at 09:09

## 2021-09-12 RX ADMIN — INSULIN ASPART 4 UNITS: 100 INJECTION, SOLUTION INTRAVENOUS; SUBCUTANEOUS at 12:09

## 2021-09-12 RX ADMIN — OMEGA-3-ACID ETHYL ESTERS 1 G: 1 CAPSULE, LIQUID FILLED ORAL at 09:09

## 2021-09-12 RX ADMIN — DONEPEZIL HYDROCHLORIDE 5 MG: 5 TABLET, FILM COATED ORAL at 09:09

## 2021-09-12 RX ADMIN — LOSARTAN POTASSIUM 100 MG: 50 TABLET, FILM COATED ORAL at 09:09

## 2021-09-12 RX ADMIN — QUETIAPINE FUMARATE 25 MG: 25 TABLET ORAL at 09:09

## 2021-09-12 RX ADMIN — SERTRALINE HYDROCHLORIDE 200 MG: 100 TABLET ORAL at 09:09

## 2021-09-12 RX ADMIN — ATORVASTATIN CALCIUM 40 MG: 20 TABLET, FILM COATED ORAL at 09:09

## 2021-09-13 LAB
ALBUMIN SERPL BCP-MCNC: 3.3 G/DL (ref 3.5–5.2)
ALP SERPL-CCNC: 111 U/L (ref 55–135)
ALT SERPL W/O P-5'-P-CCNC: 15 U/L (ref 10–44)
ANION GAP SERPL CALC-SCNC: 10 MMOL/L (ref 8–16)
AST SERPL-CCNC: 19 U/L (ref 10–40)
BASOPHILS # BLD AUTO: 0.03 K/UL (ref 0–0.2)
BASOPHILS NFR BLD: 0.6 % (ref 0–1.9)
BILIRUB SERPL-MCNC: 0.6 MG/DL (ref 0.1–1)
BUN SERPL-MCNC: 26 MG/DL (ref 8–23)
CALCIUM SERPL-MCNC: 9 MG/DL (ref 8.7–10.5)
CHLORIDE SERPL-SCNC: 111 MMOL/L (ref 95–110)
CO2 SERPL-SCNC: 20 MMOL/L (ref 23–29)
CREAT SERPL-MCNC: 1.2 MG/DL (ref 0.5–1.4)
DIFFERENTIAL METHOD: ABNORMAL
EOSINOPHIL # BLD AUTO: 0.1 K/UL (ref 0–0.5)
EOSINOPHIL NFR BLD: 1.9 % (ref 0–8)
ERYTHROCYTE [DISTWIDTH] IN BLOOD BY AUTOMATED COUNT: 16.5 % (ref 11.5–14.5)
EST. GFR  (AFRICAN AMERICAN): >60 ML/MIN/1.73 M^2
EST. GFR  (NON AFRICAN AMERICAN): >60 ML/MIN/1.73 M^2
GLUCOSE SERPL-MCNC: 109 MG/DL (ref 70–110)
HCT VFR BLD AUTO: 30.9 % (ref 40–54)
HGB BLD-MCNC: 10.1 G/DL (ref 14–18)
IMM GRANULOCYTES # BLD AUTO: 0.01 K/UL (ref 0–0.04)
IMM GRANULOCYTES NFR BLD AUTO: 0.2 % (ref 0–0.5)
LYMPHOCYTES # BLD AUTO: 1.3 K/UL (ref 1–4.8)
LYMPHOCYTES NFR BLD: 25.6 % (ref 18–48)
MAGNESIUM SERPL-MCNC: 1.9 MG/DL (ref 1.6–2.6)
MCH RBC QN AUTO: 30.3 PG (ref 27–31)
MCHC RBC AUTO-ENTMCNC: 32.7 G/DL (ref 32–36)
MCV RBC AUTO: 93 FL (ref 82–98)
MONOCYTES # BLD AUTO: 0.7 K/UL (ref 0.3–1)
MONOCYTES NFR BLD: 12.5 % (ref 4–15)
NEUTROPHILS # BLD AUTO: 3.1 K/UL (ref 1.8–7.7)
NEUTROPHILS NFR BLD: 59.2 % (ref 38–73)
NRBC BLD-RTO: 0 /100 WBC
PLATELET # BLD AUTO: 123 K/UL (ref 150–450)
PMV BLD AUTO: 9.9 FL (ref 9.2–12.9)
POCT GLUCOSE: 114 MG/DL (ref 70–110)
POCT GLUCOSE: 123 MG/DL (ref 70–110)
POCT GLUCOSE: 141 MG/DL (ref 70–110)
POCT GLUCOSE: 164 MG/DL (ref 70–110)
POTASSIUM SERPL-SCNC: 4 MMOL/L (ref 3.5–5.1)
PROT SERPL-MCNC: 5.9 G/DL (ref 6–8.4)
RBC # BLD AUTO: 3.33 M/UL (ref 4.6–6.2)
SODIUM SERPL-SCNC: 141 MMOL/L (ref 136–145)
WBC # BLD AUTO: 5.2 K/UL (ref 3.9–12.7)

## 2021-09-13 PROCEDURE — G0378 HOSPITAL OBSERVATION PER HR: HCPCS

## 2021-09-13 PROCEDURE — 25000003 PHARM REV CODE 250: Performed by: NURSE PRACTITIONER

## 2021-09-13 PROCEDURE — 99226 PR SUBSEQUENT OBSERVATION CARE,LEVEL III: ICD-10-PCS | Mod: ,,, | Performed by: PHYSICIAN ASSISTANT

## 2021-09-13 PROCEDURE — 25000003 PHARM REV CODE 250: Performed by: INTERNAL MEDICINE

## 2021-09-13 PROCEDURE — 97530 THERAPEUTIC ACTIVITIES: CPT

## 2021-09-13 PROCEDURE — 99226 PR SUBSEQUENT OBSERVATION CARE,LEVEL III: CPT | Mod: ,,, | Performed by: PHYSICIAN ASSISTANT

## 2021-09-13 PROCEDURE — 83735 ASSAY OF MAGNESIUM: CPT | Performed by: PHYSICIAN ASSISTANT

## 2021-09-13 PROCEDURE — 36415 COLL VENOUS BLD VENIPUNCTURE: CPT | Performed by: PHYSICIAN ASSISTANT

## 2021-09-13 PROCEDURE — 96372 THER/PROPH/DIAG INJ SC/IM: CPT | Mod: 59

## 2021-09-13 PROCEDURE — 25000003 PHARM REV CODE 250: Performed by: PHYSICIAN ASSISTANT

## 2021-09-13 PROCEDURE — 97116 GAIT TRAINING THERAPY: CPT

## 2021-09-13 PROCEDURE — 85025 COMPLETE CBC W/AUTO DIFF WBC: CPT | Performed by: PHYSICIAN ASSISTANT

## 2021-09-13 PROCEDURE — 80053 COMPREHEN METABOLIC PANEL: CPT | Performed by: PHYSICIAN ASSISTANT

## 2021-09-13 RX ADMIN — INSULIN ASPART 4 UNITS: 100 INJECTION, SOLUTION INTRAVENOUS; SUBCUTANEOUS at 09:09

## 2021-09-13 RX ADMIN — Medication 1 CAPSULE: at 12:09

## 2021-09-13 RX ADMIN — LACTOBACILLUS ACIDOPHILUS / LACTOBACILLUS BULGARICUS 1 EACH: 100 MILLION CFU STRENGTH GRANULES at 10:09

## 2021-09-13 RX ADMIN — LOSARTAN POTASSIUM 100 MG: 50 TABLET, FILM COATED ORAL at 09:09

## 2021-09-13 RX ADMIN — PANTOPRAZOLE SODIUM 40 MG: 40 TABLET, DELAYED RELEASE ORAL at 10:09

## 2021-09-13 RX ADMIN — INSULIN ASPART 2 UNITS: 100 INJECTION, SOLUTION INTRAVENOUS; SUBCUTANEOUS at 05:09

## 2021-09-13 RX ADMIN — INSULIN ASPART 4 UNITS: 100 INJECTION, SOLUTION INTRAVENOUS; SUBCUTANEOUS at 12:09

## 2021-09-13 RX ADMIN — LACTOBACILLUS ACIDOPHILUS / LACTOBACILLUS BULGARICUS 1 EACH: 100 MILLION CFU STRENGTH GRANULES at 09:09

## 2021-09-13 RX ADMIN — CHOLECALCIFEROL TAB 125 MCG (5000 UNIT) 5000 UNITS: 125 TAB at 09:09

## 2021-09-13 RX ADMIN — OXYBUTYNIN CHLORIDE 5 MG: 5 TABLET, EXTENDED RELEASE ORAL at 09:09

## 2021-09-13 RX ADMIN — ATORVASTATIN CALCIUM 40 MG: 20 TABLET, FILM COATED ORAL at 09:09

## 2021-09-13 RX ADMIN — SERTRALINE HYDROCHLORIDE 200 MG: 100 TABLET ORAL at 09:09

## 2021-09-13 RX ADMIN — CARVEDILOL 6.25 MG: 6.25 TABLET, FILM COATED ORAL at 09:09

## 2021-09-13 RX ADMIN — DONEPEZIL HYDROCHLORIDE 5 MG: 5 TABLET, FILM COATED ORAL at 10:09

## 2021-09-13 RX ADMIN — CARVEDILOL 6.25 MG: 6.25 TABLET, FILM COATED ORAL at 10:09

## 2021-09-13 RX ADMIN — OMEGA-3-ACID ETHYL ESTERS 1 G: 1 CAPSULE, LIQUID FILLED ORAL at 09:09

## 2021-09-13 RX ADMIN — INSULIN DETEMIR 10 UNITS: 100 INJECTION, SOLUTION SUBCUTANEOUS at 10:09

## 2021-09-13 RX ADMIN — INSULIN ASPART 4 UNITS: 100 INJECTION, SOLUTION INTRAVENOUS; SUBCUTANEOUS at 05:09

## 2021-09-13 RX ADMIN — PANTOPRAZOLE SODIUM 40 MG: 40 TABLET, DELAYED RELEASE ORAL at 09:09

## 2021-09-13 RX ADMIN — SENNOSIDES 8.6 MG: 8.6 TABLET, FILM COATED ORAL at 10:09

## 2021-09-14 LAB
POCT GLUCOSE: 124 MG/DL (ref 70–110)
POCT GLUCOSE: 127 MG/DL (ref 70–110)
POCT GLUCOSE: 140 MG/DL (ref 70–110)
POCT GLUCOSE: 172 MG/DL (ref 70–110)

## 2021-09-14 PROCEDURE — G0378 HOSPITAL OBSERVATION PER HR: HCPCS

## 2021-09-14 PROCEDURE — 25000003 PHARM REV CODE 250: Performed by: INTERNAL MEDICINE

## 2021-09-14 PROCEDURE — 99226 PR SUBSEQUENT OBSERVATION CARE,LEVEL III: CPT | Mod: ,,, | Performed by: PHYSICIAN ASSISTANT

## 2021-09-14 PROCEDURE — 25000003 PHARM REV CODE 250: Performed by: PHYSICIAN ASSISTANT

## 2021-09-14 PROCEDURE — 99226 PR SUBSEQUENT OBSERVATION CARE,LEVEL III: ICD-10-PCS | Mod: ,,, | Performed by: PHYSICIAN ASSISTANT

## 2021-09-14 PROCEDURE — 25000003 PHARM REV CODE 250: Performed by: NURSE PRACTITIONER

## 2021-09-14 PROCEDURE — 96372 THER/PROPH/DIAG INJ SC/IM: CPT | Mod: 59

## 2021-09-14 RX ORDER — INSULIN GLARGINE 100 [IU]/ML
10 INJECTION, SOLUTION SUBCUTANEOUS NIGHTLY
Qty: 3 ML | Refills: 11 | Status: SHIPPED | OUTPATIENT
Start: 2021-09-14 | End: 2022-09-14

## 2021-09-14 RX ADMIN — PANTOPRAZOLE SODIUM 40 MG: 40 TABLET, DELAYED RELEASE ORAL at 08:09

## 2021-09-14 RX ADMIN — INSULIN DETEMIR 10 UNITS: 100 INJECTION, SOLUTION SUBCUTANEOUS at 08:09

## 2021-09-14 RX ADMIN — SERTRALINE HYDROCHLORIDE 200 MG: 100 TABLET ORAL at 08:09

## 2021-09-14 RX ADMIN — CARVEDILOL 6.25 MG: 6.25 TABLET, FILM COATED ORAL at 08:09

## 2021-09-14 RX ADMIN — OXYBUTYNIN CHLORIDE 5 MG: 5 TABLET, EXTENDED RELEASE ORAL at 08:09

## 2021-09-14 RX ADMIN — SENNOSIDES 8.6 MG: 8.6 TABLET, FILM COATED ORAL at 08:09

## 2021-09-14 RX ADMIN — INSULIN ASPART 1 UNITS: 100 INJECTION, SOLUTION INTRAVENOUS; SUBCUTANEOUS at 08:09

## 2021-09-14 RX ADMIN — LOSARTAN POTASSIUM 100 MG: 50 TABLET, FILM COATED ORAL at 08:09

## 2021-09-14 RX ADMIN — INSULIN ASPART 4 UNITS: 100 INJECTION, SOLUTION INTRAVENOUS; SUBCUTANEOUS at 11:09

## 2021-09-14 RX ADMIN — OMEGA-3-ACID ETHYL ESTERS 1 G: 1 CAPSULE, LIQUID FILLED ORAL at 08:09

## 2021-09-14 RX ADMIN — DONEPEZIL HYDROCHLORIDE 5 MG: 5 TABLET, FILM COATED ORAL at 08:09

## 2021-09-14 RX ADMIN — LACTOBACILLUS ACIDOPHILUS / LACTOBACILLUS BULGARICUS 1 EACH: 100 MILLION CFU STRENGTH GRANULES at 08:09

## 2021-09-14 RX ADMIN — CHOLECALCIFEROL TAB 125 MCG (5000 UNIT) 5000 UNITS: 125 TAB at 08:09

## 2021-09-14 RX ADMIN — INSULIN ASPART 4 UNITS: 100 INJECTION, SOLUTION INTRAVENOUS; SUBCUTANEOUS at 08:09

## 2021-09-14 RX ADMIN — INSULIN ASPART 4 UNITS: 100 INJECTION, SOLUTION INTRAVENOUS; SUBCUTANEOUS at 03:09

## 2021-09-14 RX ADMIN — ATORVASTATIN CALCIUM 40 MG: 20 TABLET, FILM COATED ORAL at 08:09

## 2021-09-15 VITALS
HEIGHT: 71 IN | OXYGEN SATURATION: 94 % | WEIGHT: 183.44 LBS | DIASTOLIC BLOOD PRESSURE: 71 MMHG | HEART RATE: 70 BPM | SYSTOLIC BLOOD PRESSURE: 123 MMHG | TEMPERATURE: 98 F | BODY MASS INDEX: 25.68 KG/M2 | RESPIRATION RATE: 18 BRPM

## 2021-09-15 LAB
ALBUMIN SERPL BCP-MCNC: 3.5 G/DL (ref 3.5–5.2)
ALP SERPL-CCNC: 124 U/L (ref 55–135)
ALT SERPL W/O P-5'-P-CCNC: 14 U/L (ref 10–44)
ANION GAP SERPL CALC-SCNC: 10 MMOL/L (ref 8–16)
AST SERPL-CCNC: 18 U/L (ref 10–40)
BASOPHILS # BLD AUTO: 0.03 K/UL (ref 0–0.2)
BASOPHILS NFR BLD: 0.5 % (ref 0–1.9)
BILIRUB SERPL-MCNC: 0.8 MG/DL (ref 0.1–1)
BUN SERPL-MCNC: 23 MG/DL (ref 8–23)
CALCIUM SERPL-MCNC: 9 MG/DL (ref 8.7–10.5)
CHLORIDE SERPL-SCNC: 110 MMOL/L (ref 95–110)
CO2 SERPL-SCNC: 23 MMOL/L (ref 23–29)
CREAT SERPL-MCNC: 1.1 MG/DL (ref 0.5–1.4)
DIFFERENTIAL METHOD: ABNORMAL
EOSINOPHIL # BLD AUTO: 0.1 K/UL (ref 0–0.5)
EOSINOPHIL NFR BLD: 2 % (ref 0–8)
ERYTHROCYTE [DISTWIDTH] IN BLOOD BY AUTOMATED COUNT: 16.3 % (ref 11.5–14.5)
EST. GFR  (AFRICAN AMERICAN): >60 ML/MIN/1.73 M^2
EST. GFR  (NON AFRICAN AMERICAN): >60 ML/MIN/1.73 M^2
GLUCOSE SERPL-MCNC: 103 MG/DL (ref 70–110)
HCT VFR BLD AUTO: 31.3 % (ref 40–54)
HGB BLD-MCNC: 10.3 G/DL (ref 14–18)
IMM GRANULOCYTES # BLD AUTO: 0.02 K/UL (ref 0–0.04)
IMM GRANULOCYTES NFR BLD AUTO: 0.3 % (ref 0–0.5)
LYMPHOCYTES # BLD AUTO: 1.3 K/UL (ref 1–4.8)
LYMPHOCYTES NFR BLD: 21.8 % (ref 18–48)
MAGNESIUM SERPL-MCNC: 1.8 MG/DL (ref 1.6–2.6)
MCH RBC QN AUTO: 29.9 PG (ref 27–31)
MCHC RBC AUTO-ENTMCNC: 32.9 G/DL (ref 32–36)
MCV RBC AUTO: 91 FL (ref 82–98)
MONOCYTES # BLD AUTO: 0.7 K/UL (ref 0.3–1)
MONOCYTES NFR BLD: 11.7 % (ref 4–15)
NEUTROPHILS # BLD AUTO: 3.9 K/UL (ref 1.8–7.7)
NEUTROPHILS NFR BLD: 63.7 % (ref 38–73)
NRBC BLD-RTO: 0 /100 WBC
PLATELET # BLD AUTO: 149 K/UL (ref 150–450)
PMV BLD AUTO: 9.9 FL (ref 9.2–12.9)
POCT GLUCOSE: 114 MG/DL (ref 70–110)
POCT GLUCOSE: 182 MG/DL (ref 70–110)
POTASSIUM SERPL-SCNC: 3.8 MMOL/L (ref 3.5–5.1)
PROT SERPL-MCNC: 5.9 G/DL (ref 6–8.4)
RBC # BLD AUTO: 3.44 M/UL (ref 4.6–6.2)
SARS-COV-2 RNA RESP QL NAA+PROBE: NOT DETECTED
SODIUM SERPL-SCNC: 143 MMOL/L (ref 136–145)
WBC # BLD AUTO: 6.06 K/UL (ref 3.9–12.7)

## 2021-09-15 PROCEDURE — G0378 HOSPITAL OBSERVATION PER HR: HCPCS

## 2021-09-15 PROCEDURE — U0005 INFEC AGEN DETEC AMPLI PROBE: HCPCS | Performed by: INTERNAL MEDICINE

## 2021-09-15 PROCEDURE — 25000003 PHARM REV CODE 250: Performed by: INTERNAL MEDICINE

## 2021-09-15 PROCEDURE — 36415 COLL VENOUS BLD VENIPUNCTURE: CPT | Performed by: PHYSICIAN ASSISTANT

## 2021-09-15 PROCEDURE — U0003 INFECTIOUS AGENT DETECTION BY NUCLEIC ACID (DNA OR RNA); SEVERE ACUTE RESPIRATORY SYNDROME CORONAVIRUS 2 (SARS-COV-2) (CORONAVIRUS DISEASE [COVID-19]), AMPLIFIED PROBE TECHNIQUE, MAKING USE OF HIGH THROUGHPUT TECHNOLOGIES AS DESCRIBED BY CMS-2020-01-R: HCPCS | Performed by: INTERNAL MEDICINE

## 2021-09-15 PROCEDURE — 99217 PR OBSERVATION CARE DISCHARGE: ICD-10-PCS | Mod: ,,, | Performed by: PHYSICIAN ASSISTANT

## 2021-09-15 PROCEDURE — 25000003 PHARM REV CODE 250: Performed by: PHYSICIAN ASSISTANT

## 2021-09-15 PROCEDURE — 25000003 PHARM REV CODE 250: Performed by: NURSE PRACTITIONER

## 2021-09-15 PROCEDURE — 85025 COMPLETE CBC W/AUTO DIFF WBC: CPT | Performed by: PHYSICIAN ASSISTANT

## 2021-09-15 PROCEDURE — 80053 COMPREHEN METABOLIC PANEL: CPT | Performed by: PHYSICIAN ASSISTANT

## 2021-09-15 PROCEDURE — 99217 PR OBSERVATION CARE DISCHARGE: CPT | Mod: ,,, | Performed by: PHYSICIAN ASSISTANT

## 2021-09-15 PROCEDURE — 96372 THER/PROPH/DIAG INJ SC/IM: CPT | Mod: 59

## 2021-09-15 PROCEDURE — 83735 ASSAY OF MAGNESIUM: CPT | Performed by: PHYSICIAN ASSISTANT

## 2021-09-15 RX ADMIN — SERTRALINE HYDROCHLORIDE 200 MG: 100 TABLET ORAL at 09:09

## 2021-09-15 RX ADMIN — LACTOBACILLUS ACIDOPHILUS / LACTOBACILLUS BULGARICUS 1 EACH: 100 MILLION CFU STRENGTH GRANULES at 09:09

## 2021-09-15 RX ADMIN — Medication 1 CAPSULE: at 09:09

## 2021-09-15 RX ADMIN — CHOLECALCIFEROL TAB 125 MCG (5000 UNIT) 5000 UNITS: 125 TAB at 09:09

## 2021-09-15 RX ADMIN — OMEGA-3-ACID ETHYL ESTERS 1 G: 1 CAPSULE, LIQUID FILLED ORAL at 09:09

## 2021-09-15 RX ADMIN — LOSARTAN POTASSIUM 100 MG: 50 TABLET, FILM COATED ORAL at 09:09

## 2021-09-15 RX ADMIN — PANTOPRAZOLE SODIUM 40 MG: 40 TABLET, DELAYED RELEASE ORAL at 09:09

## 2021-09-15 RX ADMIN — INSULIN ASPART 4 UNITS: 100 INJECTION, SOLUTION INTRAVENOUS; SUBCUTANEOUS at 07:09

## 2021-09-15 RX ADMIN — OXYBUTYNIN CHLORIDE 5 MG: 5 TABLET, EXTENDED RELEASE ORAL at 09:09

## 2021-09-15 RX ADMIN — ATORVASTATIN CALCIUM 40 MG: 20 TABLET, FILM COATED ORAL at 09:09

## 2021-09-15 RX ADMIN — CARVEDILOL 6.25 MG: 6.25 TABLET, FILM COATED ORAL at 09:09

## 2021-09-19 ENCOUNTER — HISTORICAL (OUTPATIENT)
Dept: ADMINISTRATIVE | Facility: HOSPITAL | Age: 61
End: 2021-09-19

## 2021-09-19 LAB
ABS NEUT (OLG): 3.7 X10(3)/MCL (ref 1.5–6.9)
ALBUMIN SERPL-MCNC: 3.4 GM/DL (ref 3.4–4.8)
ALBUMIN/GLOB SERPL: 1.4 RATIO (ref 1.1–2)
ALP SERPL-CCNC: 125 UNIT/L (ref 40–150)
ALT SERPL-CCNC: 13 UNIT/L (ref 0–55)
AST SERPL-CCNC: 16 UNIT/L (ref 5–34)
BILIRUB SERPL-MCNC: 0.6 MG/DL
BILIRUBIN DIRECT+TOT PNL SERPL-MCNC: 0.3 MG/DL (ref 0–0.5)
BILIRUBIN DIRECT+TOT PNL SERPL-MCNC: 0.3 MG/DL (ref 0–0.8)
BUN SERPL-MCNC: 14 MG/DL (ref 8.4–25.7)
CALCIUM SERPL-MCNC: 8.7 MG/DL (ref 8.8–10)
CHLORIDE SERPL-SCNC: 114 MMOL/L (ref 98–107)
CHOLEST SERPL-MCNC: 121 MG/DL
CHOLEST/HDLC SERPL: 3 {RATIO} (ref 0–5)
CO2 SERPL-SCNC: 22 MMOL/L (ref 23–31)
CREAT SERPL-MCNC: 1.13 MG/DL (ref 0.73–1.18)
DEPRECATED CALCIDIOL+CALCIFEROL SERPL-MC: 65.3 NG/ML (ref 30–80)
ERYTHROCYTE [DISTWIDTH] IN BLOOD BY AUTOMATED COUNT: 15.6 % (ref 11.5–17)
EST. AVERAGE GLUCOSE BLD GHB EST-MCNC: 91.1 MG/DL
GLOBULIN SER-MCNC: 2.5 GM/DL (ref 2.4–3.5)
GLUCOSE SERPL-MCNC: 143 MG/DL (ref 82–115)
HBA1C MFR BLD: 4.8 %
HCT VFR BLD AUTO: 31.7 % (ref 42–52)
HDLC SERPL-MCNC: 35 MG/DL (ref 35–60)
HGB BLD-MCNC: 10.5 GM/DL (ref 14–18)
LDLC SERPL CALC-MCNC: 56 MG/DL (ref 50–140)
MCH RBC QN AUTO: 30 PG (ref 27–34)
MCHC RBC AUTO-ENTMCNC: 33 GM/DL (ref 31–36)
MCV RBC AUTO: 91 FL (ref 80–99)
PLATELET # BLD AUTO: 157 X10(3)/MCL (ref 140–400)
PMV BLD AUTO: 10.2 FL (ref 6.8–10)
POTASSIUM SERPL-SCNC: 3.7 MMOL/L (ref 3.5–5.1)
PROT SERPL-MCNC: 5.9 GM/DL (ref 5.8–7.6)
PSA SERPL-MCNC: 0.87 NG/ML
RBC # BLD AUTO: 3.49 X10(6)/MCL (ref 4.7–6.1)
SODIUM SERPL-SCNC: 147 MMOL/L (ref 136–145)
TRIGL SERPL-MCNC: 152 MG/DL (ref 34–140)
TSH SERPL-ACNC: 1.56 UIU/ML (ref 0.35–4.94)
VLDLC SERPL CALC-MCNC: 30 MG/DL
WBC # SPEC AUTO: 5.4 X10(3)/MCL (ref 4.5–11.5)

## 2021-09-29 ENCOUNTER — HISTORICAL (OUTPATIENT)
Dept: LAB | Facility: HOSPITAL | Age: 61
End: 2021-09-29

## 2021-09-29 ENCOUNTER — HISTORICAL (OUTPATIENT)
Dept: ADMINISTRATIVE | Facility: HOSPITAL | Age: 61
End: 2021-09-29

## 2021-09-29 DIAGNOSIS — E11.9 TYPE 2 DIABETES MELLITUS WITHOUT COMPLICATION: ICD-10-CM

## 2021-09-29 LAB
ABS NEUT (OLG): 4.18 X10(3)/MCL (ref 2.1–9.2)
ALBUMIN SERPL-MCNC: 3.6 GM/DL (ref 3.4–4.8)
ALBUMIN/GLOB SERPL: 1.2 RATIO (ref 1.1–2)
ALP SERPL-CCNC: 120 UNIT/L (ref 40–150)
ALT SERPL-CCNC: 14 UNIT/L (ref 0–55)
AST SERPL-CCNC: 21 UNIT/L (ref 5–34)
BASOPHILS # BLD AUTO: 0.03 X10(3)/MCL (ref 0–0.2)
BASOPHILS NFR BLD AUTO: 0.5 % (ref 0–0.9)
BILIRUB SERPL-MCNC: 0.6 MG/DL (ref 0.2–1.2)
BILIRUBIN DIRECT+TOT PNL SERPL-MCNC: 0.2 MG/DL (ref 0–0.5)
BILIRUBIN DIRECT+TOT PNL SERPL-MCNC: 0.4 MG/DL (ref 0–0.8)
BUN SERPL-MCNC: 20.7 MG/DL (ref 8.4–25.7)
CALCIUM SERPL-MCNC: 9.6 MG/DL (ref 8.8–10)
CHLORIDE SERPL-SCNC: 106 MMOL/L (ref 98–107)
CO2 SERPL-SCNC: 26 MMOL/L (ref 23–31)
CREAT SERPL-MCNC: 1.59 MG/DL (ref 0.72–1.25)
EOSINOPHIL # BLD AUTO: 0.09 X10(3)/MCL (ref 0–0.9)
EOSINOPHIL NFR BLD AUTO: 1.4 % (ref 0–6.5)
ERYTHROCYTE [DISTWIDTH] IN BLOOD BY AUTOMATED COUNT: 14.9 % (ref 11.5–17)
EST. AVERAGE GLUCOSE BLD GHB EST-MCNC: 99.7 MG/DL
GLOBULIN SER-MCNC: 3.1 GM/DL (ref 2.4–3.5)
GLUCOSE SERPL-MCNC: 139 MG/DL (ref 82–115)
HBA1C MFR BLD: 5.1 %
HCT VFR BLD AUTO: 33.4 % (ref 42–52)
HGB BLD-MCNC: 11 GM/DL (ref 14–18)
IMM GRANULOCYTES # BLD AUTO: 0.02 10*3/UL (ref 0–0.02)
IMM GRANULOCYTES NFR BLD AUTO: 0.3 % (ref 0–0.43)
LYMPHOCYTES # BLD AUTO: 1.26 X10(3)/MCL (ref 0.6–4.6)
LYMPHOCYTES NFR BLD AUTO: 19.9 % (ref 16.2–38.3)
MCH RBC QN AUTO: 29.6 PG (ref 27–31)
MCHC RBC AUTO-ENTMCNC: 32.9 GM/DL (ref 33–36)
MCV RBC AUTO: 89.8 FL (ref 80–94)
MONOCYTES # BLD AUTO: 0.75 X10(3)/MCL (ref 0.1–1.3)
MONOCYTES NFR BLD AUTO: 11.8 % (ref 4.7–11.3)
MRSA SCREEN BY PCR: NEGATIVE
NEUTROPHILS # BLD AUTO: 4.18 X10(3)/MCL (ref 2.1–9.2)
NEUTROPHILS NFR BLD AUTO: 66.1 % (ref 49.1–73.4)
NRBC BLD AUTO-RTO: 0 % (ref 0–0.2)
PLATELET # BLD AUTO: 206 X10(3)/MCL (ref 130–400)
PMV BLD AUTO: 9.8 FL (ref 7.4–10.4)
POTASSIUM SERPL-SCNC: 4.4 MMOL/L (ref 3.5–5.1)
PROT SERPL-MCNC: 6.7 GM/DL (ref 5.8–7.6)
RBC # BLD AUTO: 3.72 X10(6)/MCL (ref 4.7–6.1)
SODIUM SERPL-SCNC: 142 MMOL/L (ref 136–145)
WBC # SPEC AUTO: 6.3 X10(3)/MCL (ref 4.5–11.5)

## 2021-10-04 ENCOUNTER — PATIENT MESSAGE (OUTPATIENT)
Dept: ADMINISTRATIVE | Facility: HOSPITAL | Age: 61
End: 2021-10-04

## 2021-10-05 ENCOUNTER — HISTORICAL (OUTPATIENT)
Dept: ADMINISTRATIVE | Facility: HOSPITAL | Age: 61
End: 2021-10-05

## 2021-10-21 ENCOUNTER — PES CALL (OUTPATIENT)
Dept: ADMINISTRATIVE | Facility: CLINIC | Age: 61
End: 2021-10-21

## 2021-11-30 ENCOUNTER — HISTORICAL (OUTPATIENT)
Dept: LAB | Facility: HOSPITAL | Age: 61
End: 2021-11-30

## 2021-11-30 LAB
ABS NEUT (OLG): 3 X10(3)/MCL (ref 2.1–9.2)
ALBUMIN SERPL-MCNC: 3.7 GM/DL (ref 3.4–4.8)
ALBUMIN/GLOB SERPL: 1.5 RATIO (ref 1.1–2)
ALP SERPL-CCNC: 76 UNIT/L (ref 40–150)
ALT SERPL-CCNC: 13 UNIT/L (ref 0–55)
AST SERPL-CCNC: 18 UNIT/L (ref 5–34)
BASOPHILS # BLD AUTO: 0.02 X10(3)/MCL (ref 0–0.2)
BASOPHILS NFR BLD AUTO: 0.4 % (ref 0–0.9)
BILIRUB SERPL-MCNC: 0.6 MG/DL (ref 0.2–1.2)
BILIRUBIN DIRECT+TOT PNL SERPL-MCNC: 0.2 MG/DL (ref 0–0.5)
BILIRUBIN DIRECT+TOT PNL SERPL-MCNC: 0.4 MG/DL (ref 0–0.8)
BUN SERPL-MCNC: 19 MG/DL (ref 8.4–25.7)
CALCIUM SERPL-MCNC: 9.5 MG/DL (ref 8.8–10)
CHLORIDE SERPL-SCNC: 108 MMOL/L (ref 98–107)
CO2 SERPL-SCNC: 27 MMOL/L (ref 23–31)
CREAT SERPL-MCNC: 1.02 MG/DL (ref 0.72–1.25)
EOSINOPHIL # BLD AUTO: 0.06 X10(3)/MCL (ref 0–0.9)
EOSINOPHIL NFR BLD AUTO: 1.3 % (ref 0–6.5)
ERYTHROCYTE [DISTWIDTH] IN BLOOD BY AUTOMATED COUNT: 13.4 % (ref 11.5–17)
EST. AVERAGE GLUCOSE BLD GHB EST-MCNC: 125.5 MG/DL
GLOBULIN SER-MCNC: 2.5 GM/DL (ref 2.4–3.5)
GLUCOSE SERPL-MCNC: 111 MG/DL (ref 82–115)
HBA1C MFR BLD: 6 %
HCT VFR BLD AUTO: 33.4 % (ref 42–52)
HGB BLD-MCNC: 11.3 GM/DL (ref 14–18)
IMM GRANULOCYTES # BLD AUTO: 0.01 10*3/UL (ref 0–0.02)
IMM GRANULOCYTES NFR BLD AUTO: 0.2 % (ref 0–0.43)
LYMPHOCYTES # BLD AUTO: 1.17 X10(3)/MCL (ref 0.6–4.6)
LYMPHOCYTES NFR BLD AUTO: 24.5 % (ref 16.2–38.3)
MCH RBC QN AUTO: 29.6 PG (ref 27–31)
MCHC RBC AUTO-ENTMCNC: 33.8 GM/DL (ref 33–36)
MCV RBC AUTO: 87.4 FL (ref 80–94)
MONOCYTES # BLD AUTO: 0.52 X10(3)/MCL (ref 0.1–1.3)
MONOCYTES NFR BLD AUTO: 10.9 % (ref 4.7–11.3)
MRSA SCREEN BY PCR: NEGATIVE
NEUTROPHILS # BLD AUTO: 3 X10(3)/MCL (ref 2.1–9.2)
NEUTROPHILS NFR BLD AUTO: 62.7 % (ref 49.1–73.4)
NRBC BLD AUTO-RTO: 0 % (ref 0–0.2)
PLATELET # BLD AUTO: 152 X10(3)/MCL (ref 130–400)
PMV BLD AUTO: 9.9 FL (ref 7.4–10.4)
POTASSIUM SERPL-SCNC: 4.3 MMOL/L (ref 3.5–5.1)
PROT SERPL-MCNC: 6.2 GM/DL (ref 5.8–7.6)
RBC # BLD AUTO: 3.82 X10(6)/MCL (ref 4.7–6.1)
SODIUM SERPL-SCNC: 145 MMOL/L (ref 136–145)
WBC # SPEC AUTO: 4.8 X10(3)/MCL (ref 4.5–11.5)

## 2021-12-07 ENCOUNTER — TELEPHONE (OUTPATIENT)
Dept: FAMILY MEDICINE | Facility: CLINIC | Age: 61
End: 2021-12-07
Payer: MEDICARE

## 2022-01-01 ENCOUNTER — HISTORICAL (OUTPATIENT)
Dept: ADMINISTRATIVE | Facility: HOSPITAL | Age: 62
End: 2022-01-01

## 2022-01-01 LAB
BUN SERPL-MCNC: 21 MG/DL (ref 8.4–25.7)
CALCIUM SERPL-MCNC: 8.5 MG/DL (ref 8.7–10.5)
CHLORIDE SERPL-SCNC: 113 MMOL/L (ref 98–107)
CO2 SERPL-SCNC: 21 MMOL/L (ref 23–31)
CREAT SERPL-MCNC: 0.74 MG/DL (ref 0.73–1.18)
CREAT/UREA NIT SERPL: 28
GLUCOSE SERPL-MCNC: 106 MG/DL (ref 82–115)
POTASSIUM SERPL-SCNC: 3.9 MMOL/L (ref 3.5–5.1)
SODIUM SERPL-SCNC: 144 MMOL/L (ref 136–145)

## 2022-02-14 ENCOUNTER — HISTORICAL (OUTPATIENT)
Dept: ADMINISTRATIVE | Facility: HOSPITAL | Age: 62
End: 2022-02-14

## 2022-02-23 ENCOUNTER — PATIENT OUTREACH (OUTPATIENT)
Dept: ADMINISTRATIVE | Facility: HOSPITAL | Age: 62
End: 2022-02-23
Payer: MEDICARE

## 2022-02-24 NOTE — ED NOTES
History   Chief Complaint:  Shortness of Breath       The history is provided by the patient.      Connor Emerson is a 41 year old male with history of hypertension, lung cancer, and brain metastasis who presents for evaluation of shortness of breath. He reports that he saw his PCP today for a diabetes check and she heard something concerning in his lungs and sent him to the emergency department. The patient reports that he has a lung catheter due to his cancer and states that it has barely been draining the past two days. He also reports recent vomiting and shortness of breath, which is present while laying in the exam room. He notes that he has felt weak today as well. The patient reports chest pain where his catheter is placed. He mentions that he had gamma radiation for his brain cancer last week and will start chemotherapy this coming Monday. The patient states that he has not started taking his pain or nausea medications at home. Of note, he has blood work in process from his earlier appointment.     Review of Systems   Respiratory: Positive for shortness of breath.    Gastrointestinal: Positive for vomiting.   Neurological: Positive for weakness.   All other systems reviewed and are negative.        Allergies:  Vicodin [Hydrocodone-Acetaminophen]    Medications:  Pepcid  Insulin glargine  Metoclopramide  Nystatin  Compazine    Past Medical History:     Atypical chest pain  Complication of anesthesia  Diabetes  Hypertension  Insomnia  GERD  Mediastinal lymphadenopathy  Migraine with aura  Pneumonia  Adjustment disorder with mixed anxiety and depressed mood  Pleural effusion on right  Cellulitis of right hand  Lateral epicondylitis of right elbow  Malignant neoplasm of lower lobe of right lung  Brain metastasis      Past Surgical History:    Bronchoscopy rigid or flexible w transendoscopic endobronchial ultrasound guided  Inject block medial branch cervical/thoracic/lumbar  Bilateral rotator cuff  [place] [id number] confrimed with War Room. Baseline Rhythm and Rate- NSR 76   repair  Pleuroscopy   Insert chest tube     Family History:    Uterine cancer    Social History:  The patient presents with his wife.  Dr. Agrawal at [facility] installed catheter.      Physical Exam     Patient Vitals for the past 24 hrs:   BP Temp Temp src Pulse Resp SpO2   02/24/22 1259 (!) 175/90 98.2  F (36.8  C) Oral 103 16 100 %       Physical Exam  Vitals and nursing note reviewed.   HENT:      Head: Normocephalic.   Eyes:      Pupils: Pupils are equal, round, and reactive to light.   Cardiovascular:      Rate and Rhythm: Normal rate and regular rhythm.   Pulmonary:      Effort: Pulmonary effort is normal.      Breath sounds: Examination of the right-lower field reveals decreased breath sounds. Decreased breath sounds present.      Comments: There is a Pleurx catheter in the right chest.  There is no erythema or swelling.  Minimal drainage in the tube.  Musculoskeletal:         General: Normal range of motion.   Skin:     General: Skin is warm.      Capillary Refill: Capillary refill takes less than 2 seconds.   Neurological:      General: No focal deficit present.      Mental Status: He is alert.   Psychiatric:         Mood and Affect: Mood normal.           Emergency Department Course     Imaging:  CT Chest (PE) Abdomen Pelvis w Contrast   Final Result   IMPRESSION:   1.  No evidence of pulmonary embolism.   2.  Large right lower lobe mass with bilateral pulmonary metastases   along with metastatic disease in the mediastinum and right pulmonary   hilum is grossly similar to the prior PET/CT.   3.  There is a small right pleural effusion that contains a tunneled   drainage catheter. Lack of output from the drainage catheter likely   related to internal loculations.   4.  No acute abnormality demonstrated in the abdomen or pelvis.      REAL ALEXANDER MD            SYSTEM ID:  [id number]      Chest XR,  PA & LAT   Final Result   IMPRESSION: Right Pleurx catheter is unchanged in position. Stable    small right pleural effusion and/or pleural thickening. Stable opacity   in the right mid and lower lung. No pneumothorax. Left upper lobe lung   nodule is unchanged. Left lung otherwise clear.      AMELIA SORENSEN MD            SYSTEM ID:  [id number]      Report per radiology    Laboratory:  Labs Ordered and Resulted from Time of ED Arrival to Time of ED Departure   BASIC METABOLIC PANEL - Abnormal       Result Value    Sodium 133      Potassium 3.8      Chloride 100      Carbon Dioxide (CO2) 27      Anion Gap 6      Urea Nitrogen 23      Creatinine 0.49 (*)     Calcium 8.4 (*)     Glucose 434 (*)     GFR Estimate >90     D DIMER QUANTITATIVE - Abnormal    D-Dimer Quantitative 0.84 (*)    GLUCOSE BY METER - Abnormal    GLUCOSE BY METER POCT 360 (*)    TROPONIN I - Normal    Troponin I High Sensitivity 6         Emergency Department Course:     Reviewed:  I reviewed nursing notes, vitals, past medical history and Care Everywhere    Assessments:  1404 I obtained history and examined the patient as noted above.   1457 I rechecked and updated the patient at this time.   1531 I rechecked the patient and explained findings.   1654 I explained CT findings. At this point I feel that the patient is safe for discharge, and the patient agrees.     Interventions:  1520  mL IV  1609 insulin regular 9.39 units IV    Disposition:  The patient was discharged to home.     Impression & Plan     Medical Decision Making:  Patient presents with concerns for dyspnea in the setting of known primary lung mass.  Patient has a Pleurx catheter in and is not draining.  Chest x-ray does not reveal any large pleural effusion on the right.  Due to history of malignancy pulmonary embolism was evaluated and screened for using D-dimer due to elevated D-dimer and dyspnea CT of the chest was recommended to assess further for VTE and was negative.  Cause of his shortness of breath is unclear though patient has underlying mediastinal adenopathy  and lung mass which may be the underlying cause but resting oxygen saturations are normal no need for admission was identified patient is hyperglycemic but currently on dexamethasone patient has to follow-up as a primary oncology patient to return with worsening condition patient was offered the interventional radiology clinic number if he has ongoing issues with his Pleurx catheter and was discharged in stable condition.    Diagnosis:    ICD-10-CM    1. Dyspnea, unspecified type  R06.00        Discharge Medications:  New Prescriptions    No medications on file     Scribe Disclosure:  I, Trisha Riley, am serving as a scribe at 2:19 PM on 2/24/2022 to document services personally performed by Alex Reese MD based on my observations and the provider's statements to me.            Alex Reese MD  03/01/22 0755

## 2022-03-14 NOTE — TELEPHONE ENCOUNTER
1. Continue with Vitamin B12 sublingual supplements, 3000 mcg daily   2. Vitamin B12 level  3. Follow through with rheumatology evaluation re: elevated rheumatoid factor  4. Follow up in 3 months or sooner if needed. 5. Call if any questions or concerns. lvm

## 2022-03-28 ENCOUNTER — LAB VISIT (OUTPATIENT)
Dept: LAB | Facility: HOSPITAL | Age: 62
End: 2022-03-28
Attending: HOSPITALIST
Payer: MEDICARE

## 2022-03-28 DIAGNOSIS — D50.9 IRON DEFICIENCY ANEMIA, UNSPECIFIED: Primary | ICD-10-CM

## 2022-03-28 LAB — OB PNL STL: NEGATIVE

## 2022-03-28 PROCEDURE — 82272 OCCULT BLD FECES 1-3 TESTS: CPT | Performed by: HOSPITALIST

## 2022-04-05 ENCOUNTER — PES CALL (OUTPATIENT)
Dept: ADMINISTRATIVE | Facility: CLINIC | Age: 62
End: 2022-04-05
Payer: MEDICARE

## 2022-04-11 ENCOUNTER — HISTORICAL (OUTPATIENT)
Dept: ADMINISTRATIVE | Facility: HOSPITAL | Age: 62
End: 2022-04-11
Payer: MEDICARE

## 2022-04-29 VITALS
DIASTOLIC BLOOD PRESSURE: 70 MMHG | HEIGHT: 68 IN | SYSTOLIC BLOOD PRESSURE: 124 MMHG | WEIGHT: 229.94 LBS | BODY MASS INDEX: 34.85 KG/M2

## 2022-05-13 ENCOUNTER — PES CALL (OUTPATIENT)
Dept: ADMINISTRATIVE | Facility: CLINIC | Age: 62
End: 2022-05-13
Payer: MEDICARE

## 2022-05-17 ENCOUNTER — PATIENT OUTREACH (OUTPATIENT)
Dept: ADMINISTRATIVE | Facility: HOSPITAL | Age: 62
End: 2022-05-17
Payer: MEDICARE

## 2022-05-17 DIAGNOSIS — E11.9 TYPE 2 DIABETES MELLITUS WITHOUT COMPLICATION, WITHOUT LONG-TERM CURRENT USE OF INSULIN: Primary | ICD-10-CM

## 2022-05-17 NOTE — PROGRESS NOTES
HM and immunizations reviewed/ updated.  Micro urine needs to be completed.   Diabetic eye exam needs to be scheduled.

## 2022-05-31 ENCOUNTER — PATIENT MESSAGE (OUTPATIENT)
Dept: ADMINISTRATIVE | Facility: HOSPITAL | Age: 62
End: 2022-05-31
Payer: MEDICARE

## 2022-06-19 ENCOUNTER — HOSPITAL ENCOUNTER (OUTPATIENT)
Facility: HOSPITAL | Age: 62
Discharge: HOME | End: 2022-06-21
Attending: STUDENT IN AN ORGANIZED HEALTH CARE EDUCATION/TRAINING PROGRAM | Admitting: STUDENT IN AN ORGANIZED HEALTH CARE EDUCATION/TRAINING PROGRAM
Payer: MEDICARE

## 2022-06-19 DIAGNOSIS — F03.91 DEMENTIA WITH BEHAVIORAL DISTURBANCE, UNSPECIFIED DEMENTIA TYPE: Chronic | ICD-10-CM

## 2022-06-19 DIAGNOSIS — I10 ESSENTIAL HYPERTENSION: Chronic | ICD-10-CM

## 2022-06-19 DIAGNOSIS — Z71.89 ADVANCE CARE PLANNING: ICD-10-CM

## 2022-06-19 DIAGNOSIS — Z79.4 CONTROLLED TYPE 2 DIABETES MELLITUS WITH DIABETIC NEPHROPATHY, WITH LONG-TERM CURRENT USE OF INSULIN: ICD-10-CM

## 2022-06-19 DIAGNOSIS — R53.81 DEBILITY: Chronic | ICD-10-CM

## 2022-06-19 DIAGNOSIS — R62.7 FAILURE TO THRIVE IN ADULT: ICD-10-CM

## 2022-06-19 DIAGNOSIS — R53.1 GENERALIZED WEAKNESS: ICD-10-CM

## 2022-06-19 DIAGNOSIS — I69.359 HEMIPLEGIA AS LATE EFFECT OF CEREBROVASCULAR ACCIDENT (CVA): ICD-10-CM

## 2022-06-19 DIAGNOSIS — E11.21 CONTROLLED TYPE 2 DIABETES MELLITUS WITH DIABETIC NEPHROPATHY, WITH LONG-TERM CURRENT USE OF INSULIN: ICD-10-CM

## 2022-06-19 DIAGNOSIS — N30.01 ACUTE CYSTITIS WITH HEMATURIA: ICD-10-CM

## 2022-06-19 LAB
ALBUMIN SERPL BCP-MCNC: 2.7 G/DL (ref 3.5–5.2)
ALP SERPL-CCNC: 62 U/L (ref 55–135)
ALT SERPL W/O P-5'-P-CCNC: 17 U/L (ref 10–44)
ANION GAP SERPL CALC-SCNC: 11 MMOL/L (ref 8–16)
AST SERPL-CCNC: 25 U/L (ref 10–40)
BACTERIA #/AREA URNS HPF: ABNORMAL /HPF
BASOPHILS # BLD AUTO: 0.01 K/UL (ref 0–0.2)
BASOPHILS NFR BLD: 0.1 % (ref 0–1.9)
BILIRUB SERPL-MCNC: 0.5 MG/DL (ref 0.1–1)
BILIRUB UR QL STRIP: NEGATIVE
BUN SERPL-MCNC: 42 MG/DL (ref 8–23)
CALCIUM SERPL-MCNC: 8.8 MG/DL (ref 8.7–10.5)
CHLORIDE SERPL-SCNC: 115 MMOL/L (ref 95–110)
CLARITY UR: ABNORMAL
CO2 SERPL-SCNC: 19 MMOL/L (ref 23–29)
COLOR UR: YELLOW
CREAT SERPL-MCNC: 1.4 MG/DL (ref 0.5–1.4)
DIFFERENTIAL METHOD: ABNORMAL
EOSINOPHIL # BLD AUTO: 0 K/UL (ref 0–0.5)
EOSINOPHIL NFR BLD: 0.4 % (ref 0–8)
ERYTHROCYTE [DISTWIDTH] IN BLOOD BY AUTOMATED COUNT: 15.5 % (ref 11.5–14.5)
EST. GFR  (AFRICAN AMERICAN): >60 ML/MIN/1.73 M^2
EST. GFR  (NON AFRICAN AMERICAN): 53 ML/MIN/1.73 M^2
GLUCOSE SERPL-MCNC: 300 MG/DL (ref 70–110)
GLUCOSE UR QL STRIP: NEGATIVE
HCT VFR BLD AUTO: 28 % (ref 40–54)
HGB BLD-MCNC: 9.2 G/DL (ref 14–18)
HGB UR QL STRIP: ABNORMAL
HYALINE CASTS #/AREA URNS LPF: 0 /LPF
IMM GRANULOCYTES # BLD AUTO: 0.01 K/UL (ref 0–0.04)
IMM GRANULOCYTES NFR BLD AUTO: 0.1 % (ref 0–0.5)
INFLUENZA A, MOLECULAR: NEGATIVE
INFLUENZA B, MOLECULAR: NEGATIVE
KETONES UR QL STRIP: NEGATIVE
LEUKOCYTE ESTERASE UR QL STRIP: ABNORMAL
LYMPHOCYTES # BLD AUTO: 0.9 K/UL (ref 1–4.8)
LYMPHOCYTES NFR BLD: 13.1 % (ref 18–48)
MCH RBC QN AUTO: 29.4 PG (ref 27–31)
MCHC RBC AUTO-ENTMCNC: 32.9 G/DL (ref 32–36)
MCV RBC AUTO: 90 FL (ref 82–98)
MICROSCOPIC COMMENT: ABNORMAL
MONOCYTES # BLD AUTO: 0.7 K/UL (ref 0.3–1)
MONOCYTES NFR BLD: 10.2 % (ref 4–15)
NEUTROPHILS # BLD AUTO: 5.5 K/UL (ref 1.8–7.7)
NEUTROPHILS NFR BLD: 76.1 % (ref 38–73)
NITRITE UR QL STRIP: POSITIVE
NRBC BLD-RTO: 0 /100 WBC
PH UR STRIP: 6 [PH] (ref 5–8)
PLATELET # BLD AUTO: 129 K/UL (ref 150–450)
PMV BLD AUTO: 9.4 FL (ref 9.2–12.9)
POTASSIUM SERPL-SCNC: 3.9 MMOL/L (ref 3.5–5.1)
PROT SERPL-MCNC: 5.9 G/DL (ref 6–8.4)
PROT UR QL STRIP: ABNORMAL
RBC # BLD AUTO: 3.13 M/UL (ref 4.6–6.2)
RBC #/AREA URNS HPF: >100 /HPF (ref 0–4)
SARS-COV-2 RDRP RESP QL NAA+PROBE: NEGATIVE
SODIUM SERPL-SCNC: 145 MMOL/L (ref 136–145)
SP GR UR STRIP: 1.01 (ref 1–1.03)
SPECIMEN SOURCE: NORMAL
URN SPEC COLLECT METH UR: ABNORMAL
UROBILINOGEN UR STRIP-ACNC: 1 EU/DL
WBC # BLD AUTO: 7.18 K/UL (ref 3.9–12.7)
WBC #/AREA URNS HPF: >100 /HPF (ref 0–5)

## 2022-06-19 PROCEDURE — 99285 EMERGENCY DEPT VISIT HI MDM: CPT | Mod: 25

## 2022-06-19 PROCEDURE — 80053 COMPREHEN METABOLIC PANEL: CPT | Performed by: STUDENT IN AN ORGANIZED HEALTH CARE EDUCATION/TRAINING PROGRAM

## 2022-06-19 PROCEDURE — 96361 HYDRATE IV INFUSION ADD-ON: CPT

## 2022-06-19 PROCEDURE — 87502 INFLUENZA DNA AMP PROBE: CPT | Performed by: STUDENT IN AN ORGANIZED HEALTH CARE EDUCATION/TRAINING PROGRAM

## 2022-06-19 PROCEDURE — 94760 N-INVAS EAR/PLS OXIMETRY 1: CPT

## 2022-06-19 PROCEDURE — 81000 URINALYSIS NONAUTO W/SCOPE: CPT | Performed by: STUDENT IN AN ORGANIZED HEALTH CARE EDUCATION/TRAINING PROGRAM

## 2022-06-19 PROCEDURE — 85025 COMPLETE CBC W/AUTO DIFF WBC: CPT | Performed by: STUDENT IN AN ORGANIZED HEALTH CARE EDUCATION/TRAINING PROGRAM

## 2022-06-19 PROCEDURE — 96365 THER/PROPH/DIAG IV INF INIT: CPT

## 2022-06-19 PROCEDURE — 25000003 PHARM REV CODE 250: Performed by: STUDENT IN AN ORGANIZED HEALTH CARE EDUCATION/TRAINING PROGRAM

## 2022-06-19 PROCEDURE — U0002 COVID-19 LAB TEST NON-CDC: HCPCS | Performed by: STUDENT IN AN ORGANIZED HEALTH CARE EDUCATION/TRAINING PROGRAM

## 2022-06-19 PROCEDURE — 93005 ELECTROCARDIOGRAM TRACING: CPT

## 2022-06-19 PROCEDURE — 83036 HEMOGLOBIN GLYCOSYLATED A1C: CPT | Performed by: STUDENT IN AN ORGANIZED HEALTH CARE EDUCATION/TRAINING PROGRAM

## 2022-06-19 PROCEDURE — 87077 CULTURE AEROBIC IDENTIFY: CPT | Performed by: STUDENT IN AN ORGANIZED HEALTH CARE EDUCATION/TRAINING PROGRAM

## 2022-06-19 PROCEDURE — 87088 URINE BACTERIA CULTURE: CPT | Performed by: STUDENT IN AN ORGANIZED HEALTH CARE EDUCATION/TRAINING PROGRAM

## 2022-06-19 PROCEDURE — 87186 SC STD MICRODIL/AGAR DIL: CPT | Performed by: STUDENT IN AN ORGANIZED HEALTH CARE EDUCATION/TRAINING PROGRAM

## 2022-06-19 PROCEDURE — 93010 EKG 12-LEAD: ICD-10-PCS | Mod: ,,, | Performed by: INTERNAL MEDICINE

## 2022-06-19 PROCEDURE — 87040 BLOOD CULTURE FOR BACTERIA: CPT | Mod: 59 | Performed by: STUDENT IN AN ORGANIZED HEALTH CARE EDUCATION/TRAINING PROGRAM

## 2022-06-19 PROCEDURE — 63600175 PHARM REV CODE 636 W HCPCS: Performed by: STUDENT IN AN ORGANIZED HEALTH CARE EDUCATION/TRAINING PROGRAM

## 2022-06-19 PROCEDURE — G0378 HOSPITAL OBSERVATION PER HR: HCPCS

## 2022-06-19 PROCEDURE — 93010 ELECTROCARDIOGRAM REPORT: CPT | Mod: ,,, | Performed by: INTERNAL MEDICINE

## 2022-06-19 PROCEDURE — 36415 COLL VENOUS BLD VENIPUNCTURE: CPT | Performed by: STUDENT IN AN ORGANIZED HEALTH CARE EDUCATION/TRAINING PROGRAM

## 2022-06-19 PROCEDURE — 87086 URINE CULTURE/COLONY COUNT: CPT | Performed by: STUDENT IN AN ORGANIZED HEALTH CARE EDUCATION/TRAINING PROGRAM

## 2022-06-19 RX ORDER — SODIUM CHLORIDE 9 MG/ML
1000 INJECTION, SOLUTION INTRAVENOUS
Status: COMPLETED | OUTPATIENT
Start: 2022-06-19 | End: 2022-06-19

## 2022-06-19 RX ORDER — FERROUS SULFATE 325(65) MG
325 TABLET ORAL
COMMUNITY

## 2022-06-19 RX ORDER — ACETAMINOPHEN 500 MG
1000 TABLET ORAL
Status: ACTIVE | OUTPATIENT
Start: 2022-06-19 | End: 2022-06-20

## 2022-06-19 RX ORDER — ASCORBIC ACID 500 MG
500 TABLET ORAL DAILY
COMMUNITY

## 2022-06-19 RX ORDER — OXYBUTYNIN CHLORIDE 5 MG/1
10 TABLET ORAL DAILY
Status: ON HOLD | COMMUNITY
End: 2022-06-21 | Stop reason: HOSPADM

## 2022-06-19 RX ORDER — NAPROXEN SODIUM 220 MG/1
81 TABLET, FILM COATED ORAL DAILY
COMMUNITY

## 2022-06-19 RX ADMIN — SODIUM CHLORIDE 1000 ML: 0.9 INJECTION, SOLUTION INTRAVENOUS at 08:06

## 2022-06-19 RX ADMIN — CEFTRIAXONE 2 G: 2 INJECTION, SOLUTION INTRAVENOUS at 11:06

## 2022-06-19 RX ADMIN — SODIUM CHLORIDE 1000 ML: 0.9 INJECTION, SOLUTION INTRAVENOUS at 11:06

## 2022-06-20 PROBLEM — Z71.89 ADVANCE CARE PLANNING: Status: ACTIVE | Noted: 2018-12-07

## 2022-06-20 PROBLEM — N30.01 ACUTE CYSTITIS WITH HEMATURIA: Status: ACTIVE | Noted: 2022-06-20

## 2022-06-20 PROBLEM — I69.359 HEMIPLEGIA AS LATE EFFECT OF CEREBROVASCULAR ACCIDENT (CVA): Status: ACTIVE | Noted: 2022-06-20

## 2022-06-20 LAB
ESTIMATED AVG GLUCOSE: 103 MG/DL (ref 68–131)
HBA1C MFR BLD: 5.2 % (ref 4–5.6)
LACTATE SERPL-SCNC: 1.5 MMOL/L (ref 0.5–2.2)
LACTATE SERPL-SCNC: 2.6 MMOL/L (ref 0.5–2.2)
POCT GLUCOSE: 156 MG/DL (ref 70–110)
POCT GLUCOSE: 160 MG/DL (ref 70–110)
POCT GLUCOSE: 214 MG/DL (ref 70–110)
POCT GLUCOSE: 234 MG/DL (ref 70–110)

## 2022-06-20 PROCEDURE — 83605 ASSAY OF LACTIC ACID: CPT | Performed by: NURSE PRACTITIONER

## 2022-06-20 PROCEDURE — 1158F ADVNC CARE PLAN TLK DOCD: CPT | Mod: CPTII,,, | Performed by: INTERNAL MEDICINE

## 2022-06-20 PROCEDURE — 36415 COLL VENOUS BLD VENIPUNCTURE: CPT | Performed by: STUDENT IN AN ORGANIZED HEALTH CARE EDUCATION/TRAINING PROGRAM

## 2022-06-20 PROCEDURE — 25000003 PHARM REV CODE 250: Performed by: INTERNAL MEDICINE

## 2022-06-20 PROCEDURE — 96366 THER/PROPH/DIAG IV INF ADDON: CPT

## 2022-06-20 PROCEDURE — 99220 PR INITIAL OBSERVATION CARE,LEVL III: ICD-10-PCS | Mod: ,,, | Performed by: INTERNAL MEDICINE

## 2022-06-20 PROCEDURE — 94761 N-INVAS EAR/PLS OXIMETRY MLT: CPT

## 2022-06-20 PROCEDURE — 1158F PR ADVANCE CARE PLANNING DISCUSS DOCUMENTED IN MEDICAL RECORD: ICD-10-PCS | Mod: CPTII,,, | Performed by: INTERNAL MEDICINE

## 2022-06-20 PROCEDURE — 63600175 PHARM REV CODE 636 W HCPCS: Performed by: NURSE PRACTITIONER

## 2022-06-20 PROCEDURE — 97162 PT EVAL MOD COMPLEX 30 MIN: CPT

## 2022-06-20 PROCEDURE — 96372 THER/PROPH/DIAG INJ SC/IM: CPT | Performed by: NURSE PRACTITIONER

## 2022-06-20 PROCEDURE — 99220 PR INITIAL OBSERVATION CARE,LEVL III: CPT | Mod: ,,, | Performed by: INTERNAL MEDICINE

## 2022-06-20 PROCEDURE — 25000003 PHARM REV CODE 250: Performed by: NURSE PRACTITIONER

## 2022-06-20 PROCEDURE — 36415 COLL VENOUS BLD VENIPUNCTURE: CPT | Performed by: NURSE PRACTITIONER

## 2022-06-20 PROCEDURE — G0378 HOSPITAL OBSERVATION PER HR: HCPCS

## 2022-06-20 PROCEDURE — 92610 EVALUATE SWALLOWING FUNCTION: CPT

## 2022-06-20 PROCEDURE — 96361 HYDRATE IV INFUSION ADD-ON: CPT

## 2022-06-20 RX ORDER — TALC
6 POWDER (GRAM) TOPICAL NIGHTLY PRN
Status: DISCONTINUED | OUTPATIENT
Start: 2022-06-20 | End: 2022-06-21 | Stop reason: HOSPADM

## 2022-06-20 RX ORDER — IBUPROFEN 200 MG
16 TABLET ORAL
Status: DISCONTINUED | OUTPATIENT
Start: 2022-06-20 | End: 2022-06-21 | Stop reason: HOSPADM

## 2022-06-20 RX ORDER — DONEPEZIL HYDROCHLORIDE 5 MG/1
5 TABLET, FILM COATED ORAL NIGHTLY
Status: DISCONTINUED | OUTPATIENT
Start: 2022-06-20 | End: 2022-06-21 | Stop reason: HOSPADM

## 2022-06-20 RX ORDER — LANOLIN ALCOHOL/MO/W.PET/CERES
1 CREAM (GRAM) TOPICAL DAILY
Status: DISCONTINUED | OUTPATIENT
Start: 2022-06-20 | End: 2022-06-21 | Stop reason: HOSPADM

## 2022-06-20 RX ORDER — ASCORBIC ACID 500 MG
500 TABLET ORAL DAILY
Status: DISCONTINUED | OUTPATIENT
Start: 2022-06-20 | End: 2022-06-21 | Stop reason: HOSPADM

## 2022-06-20 RX ORDER — GLUCAGON 1 MG
1 KIT INJECTION
Status: DISCONTINUED | OUTPATIENT
Start: 2022-06-20 | End: 2022-06-20

## 2022-06-20 RX ORDER — POLYETHYLENE GLYCOL 3350 17 G/17G
17 POWDER, FOR SOLUTION ORAL DAILY
Status: DISCONTINUED | OUTPATIENT
Start: 2022-06-20 | End: 2022-06-21 | Stop reason: HOSPADM

## 2022-06-20 RX ORDER — SERTRALINE HYDROCHLORIDE 100 MG/1
200 TABLET, FILM COATED ORAL DAILY
Status: DISCONTINUED | OUTPATIENT
Start: 2022-06-20 | End: 2022-06-21 | Stop reason: HOSPADM

## 2022-06-20 RX ORDER — IBUPROFEN 200 MG
24 TABLET ORAL
Status: DISCONTINUED | OUTPATIENT
Start: 2022-06-20 | End: 2022-06-20

## 2022-06-20 RX ORDER — GLUCAGON 1 MG
1 KIT INJECTION
Status: DISCONTINUED | OUTPATIENT
Start: 2022-06-20 | End: 2022-06-21 | Stop reason: HOSPADM

## 2022-06-20 RX ORDER — CHOLECALCIFEROL (VITAMIN D3) 25 MCG
5000 TABLET ORAL DAILY
Status: DISCONTINUED | OUTPATIENT
Start: 2022-06-20 | End: 2022-06-21 | Stop reason: HOSPADM

## 2022-06-20 RX ORDER — ATORVASTATIN CALCIUM 40 MG/1
40 TABLET, FILM COATED ORAL DAILY
Status: DISCONTINUED | OUTPATIENT
Start: 2022-06-20 | End: 2022-06-21 | Stop reason: HOSPADM

## 2022-06-20 RX ORDER — INSULIN ASPART 100 [IU]/ML
0-5 INJECTION, SOLUTION INTRAVENOUS; SUBCUTANEOUS
Status: DISCONTINUED | OUTPATIENT
Start: 2022-06-20 | End: 2022-06-21 | Stop reason: HOSPADM

## 2022-06-20 RX ORDER — INSULIN ASPART 100 [IU]/ML
0-5 INJECTION, SOLUTION INTRAVENOUS; SUBCUTANEOUS
Status: DISCONTINUED | OUTPATIENT
Start: 2022-06-20 | End: 2022-06-20

## 2022-06-20 RX ORDER — SODIUM CHLORIDE 9 MG/ML
INJECTION, SOLUTION INTRAVENOUS CONTINUOUS
Status: DISCONTINUED | OUTPATIENT
Start: 2022-06-20 | End: 2022-06-21 | Stop reason: HOSPADM

## 2022-06-20 RX ORDER — NAPROXEN SODIUM 220 MG/1
81 TABLET, FILM COATED ORAL DAILY
Status: DISCONTINUED | OUTPATIENT
Start: 2022-06-20 | End: 2022-06-21 | Stop reason: HOSPADM

## 2022-06-20 RX ORDER — PANTOPRAZOLE SODIUM 40 MG/1
40 TABLET, DELAYED RELEASE ORAL 2 TIMES DAILY
Status: DISCONTINUED | OUTPATIENT
Start: 2022-06-20 | End: 2022-06-21 | Stop reason: HOSPADM

## 2022-06-20 RX ORDER — IBUPROFEN 200 MG
24 TABLET ORAL
Status: DISCONTINUED | OUTPATIENT
Start: 2022-06-20 | End: 2022-06-21 | Stop reason: HOSPADM

## 2022-06-20 RX ORDER — ENOXAPARIN SODIUM 100 MG/ML
40 INJECTION SUBCUTANEOUS EVERY 24 HOURS
Status: DISCONTINUED | OUTPATIENT
Start: 2022-06-20 | End: 2022-06-21 | Stop reason: HOSPADM

## 2022-06-20 RX ORDER — IBUPROFEN 200 MG
16 TABLET ORAL
Status: DISCONTINUED | OUTPATIENT
Start: 2022-06-20 | End: 2022-06-20

## 2022-06-20 RX ORDER — CARVEDILOL 3.12 MG/1
6.25 TABLET ORAL 2 TIMES DAILY
Status: DISCONTINUED | OUTPATIENT
Start: 2022-06-20 | End: 2022-06-21 | Stop reason: HOSPADM

## 2022-06-20 RX ORDER — OXYBUTYNIN CHLORIDE 5 MG/1
5 TABLET, EXTENDED RELEASE ORAL DAILY
Status: DISCONTINUED | OUTPATIENT
Start: 2022-06-20 | End: 2022-06-21 | Stop reason: HOSPADM

## 2022-06-20 RX ORDER — SODIUM CHLORIDE 0.9 % (FLUSH) 0.9 %
10 SYRINGE (ML) INJECTION
Status: DISCONTINUED | OUTPATIENT
Start: 2022-06-20 | End: 2022-06-21 | Stop reason: HOSPADM

## 2022-06-20 RX ADMIN — SODIUM CHLORIDE: 0.9 INJECTION, SOLUTION INTRAVENOUS at 11:06

## 2022-06-20 RX ADMIN — POLYETHYLENE GLYCOL (3350) 17 G: 17 POWDER, FOR SOLUTION ORAL at 11:06

## 2022-06-20 RX ADMIN — CARVEDILOL 6.25 MG: 3.12 TABLET, FILM COATED ORAL at 09:06

## 2022-06-20 RX ADMIN — DONEPEZIL HYDROCHLORIDE 5 MG: 5 TABLET, FILM COATED ORAL at 09:06

## 2022-06-20 RX ADMIN — SERTRALINE 200 MG: 100 TABLET, FILM COATED ORAL at 11:06

## 2022-06-20 RX ADMIN — PANTOPRAZOLE SODIUM 40 MG: 40 TABLET, DELAYED RELEASE ORAL at 09:06

## 2022-06-20 RX ADMIN — ATORVASTATIN CALCIUM 40 MG: 40 TABLET, FILM COATED ORAL at 11:06

## 2022-06-20 RX ADMIN — CARVEDILOL 6.25 MG: 3.12 TABLET, FILM COATED ORAL at 11:06

## 2022-06-20 RX ADMIN — ENOXAPARIN SODIUM 40 MG: 40 INJECTION SUBCUTANEOUS at 06:06

## 2022-06-20 RX ADMIN — ASPIRIN 81 MG: 81 TABLET, CHEWABLE ORAL at 11:06

## 2022-06-20 RX ADMIN — FERROUS SULFATE TAB 325 MG (65 MG ELEMENTAL FE) 1 EACH: 325 (65 FE) TAB at 11:06

## 2022-06-20 RX ADMIN — OXYCODONE HYDROCHLORIDE AND ACETAMINOPHEN 500 MG: 500 TABLET ORAL at 11:06

## 2022-06-20 RX ADMIN — CEFTRIAXONE 1 G: 1 INJECTION, SOLUTION INTRAVENOUS at 11:06

## 2022-06-20 RX ADMIN — PANTOPRAZOLE SODIUM 40 MG: 40 TABLET, DELAYED RELEASE ORAL at 11:06

## 2022-06-20 RX ADMIN — Medication 5000 UNITS: at 11:06

## 2022-06-20 RX ADMIN — INSULIN ASPART 2 UNITS: 100 INJECTION, SOLUTION INTRAVENOUS; SUBCUTANEOUS at 06:06

## 2022-06-20 RX ADMIN — INSULIN ASPART 1 UNITS: 100 INJECTION, SOLUTION INTRAVENOUS; SUBCUTANEOUS at 09:06

## 2022-06-20 RX ADMIN — OXYBUTYNIN CHLORIDE 5 MG: 5 TABLET, EXTENDED RELEASE ORAL at 11:06

## 2022-06-20 NOTE — ASSESSMENT & PLAN NOTE
Hold lantus 10 units daily and give SSI low dose for now- reduced appetite reported with recent weight loss.

## 2022-06-20 NOTE — ED NOTES
Bed: ED 05  Expected date:   Expected time:   Means of arrival:   Comments:  Nsg home- fever EMS     Jayesh Lay NP  06/19/22 1944

## 2022-06-20 NOTE — SUBJECTIVE & OBJECTIVE
Past Medical History:   Diagnosis Date    Amblyopia     Cataract     CNS vasculitis 6/2/2017    Follows with Dr. Love By mri.  Several active lesions, many old. 5/13: MRA brain/neck, MRI brain w/wo contrast show no vascular occlusion, multiple foci of hyperintensity in deep cerebral periventricular white matter in pattern of demyelinating process.  5/17: MRI spine performed, no spinal cord lesions. Hospitalization 6/2017. EEG was performed negative for seizures.  Repeat MRI showing new lesion, question whether this was demyelination versus CVA. 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 Findings compatible with a small acute lacunar infarct adjacent to the left frontal horn.  Nonspecific enhancement just inferior to this region and extending into the left basal ganglia, as well as within the inferior aspect of the left cerebellum.  No evidence of a focal mass. 2.  Extensive increased signal intensity involving the periventricular white matter compatible with demyelinating disease versus vasculitis. 3.  Clinical correlation and followup recommended. 4.  This reportedly flattened in the EPIC and the corpus callosum medical record system.    Mixed hyperlipidemia 11/9/2012    NAFLD (nonalcoholic fatty liver disease) 10/11/2013    CHASE (nonalcoholic steatohepatitis)     Obesity     Stroke     Type 2 diabetes mellitus with diabetic polyneuropathy, with long-term current use of insulin 5/14/2017    Type 2 diabetes mellitus with hyperglycemia, with long-term current use of insulin 10/11/2013       Past Surgical History:   Procedure Laterality Date    COLONOSCOPY N/A 5/21/2019    Procedure: COLONOSCOPY;  Surgeon: Alex Ascencio MD;  Location: [facility];  Service: Endoscopy;  Laterality: N/A;  confirmed appt-sp     INSERTION OF TUNNELED CENTRAL VENOUS CATHETER (CVC) WITH SUBCUTANEOUS PORT N/A 12/7/2018    Procedure: [id number];  Surgeon: Luan Diagnostic Provider;  Location: [facility] OR [address] [place] [address];  Service: Radiology;  Laterality: N/A;    SKIN CANCER EXCISION         Review of patient's allergies indicates:  No Known Allergies    No current facility-administered medications on file prior to encounter.     Current Outpatient Medications on File Prior to Encounter   Medication Sig    ascorbic acid, vitamin C, (VITAMIN C) 500 MG tablet Take 500 mg by mouth once daily.    aspirin 81 MG Chew Take 81 mg by mouth once daily.    atorvastatin (LIPITOR) 40 MG tablet Take 1 tablet (40 mg total) by mouth once daily. for 7 days    carvediloL (COREG) 6.25 MG tablet Take 1 tablet (6.25 mg total) by mouth 2 (two) times daily. for 7 days    donepeziL (ARICEPT) 5 MG tablet Take 1 tablet (5 mg total) by mouth every evening. for 7 days    ferrous sulfate (FEOSOL) 325 mg (65 mg iron) Tab tablet Take 325 mg by mouth daily with breakfast.    flash glucose sensor (FREESTYLE ARELY 14 DAY SENSOR) Kit USE   TO CHECK GLUCOSE 4 TIMES DAILY    FREESTYLE ARELY 14 DAY READER Misc GLUCOSE TESTING : FASTING AND PRIOR TO MEALS    insulin glargine (LANTUS) 100 unit/mL injection Inject 10 Units into the skin every evening.    irbesartan (AVAPRO) 300 MG tablet Take 1 tablet (300 mg total) by mouth every evening. for 7 days    lactobacillus combination no.4 (PROBIOTIC) 3 billion cell Cap Take 1 capsule by mouth once daily.    lancets 30 gauge Misc Test blood sugar four times a day    omega-3 fatty acids-vitamin E (FISH OIL) 1,000 mg Cap Take 1 capsule by mouth 2 (two) times daily.    oxybutynin (DITROPAN) 5 MG Tab Take 10 mg by mouth once daily.    oxybutynin (DITROPAN-XL) 5 MG TR24 Take 1 tablet (5 mg total) by mouth once daily. for 7 days    pantoprazole (PROTONIX) 40 MG tablet Take 1 tablet (40 mg total) by mouth 2 (two) times daily. for 7 days    pen  "needle, diabetic 32 gauge x 5/32" Ndle USE 1 PEN NEEDLE 4 TIMES DAILY (  SINGLE  USE)    sertraline (ZOLOFT) 100 MG tablet Take 2 tablets (200 mg total) by mouth once daily. for 7 days    vitamin D 1000 units Tab Take 5 tablets (5,000 Units total) by mouth once daily.    alendronate (FOSAMAX) 70 MG tablet Take 1 tablet (70 mg total) by mouth every 7 days.    QUEtiapine (SEROQUEL) 25 MG Tab Take 1 tablet (25 mg total) by mouth nightly as needed (sleep). (Patient not taking: Reported on 6/19/2022)     Family History       Problem Relation (Age of Onset)    Cancer Father    Cataracts Mother    Diabetes Maternal Aunt    Heart disease Mother    Heart failure Mother    Hypertension Mother    Rheum arthritis Paternal Grandmother    Stroke Sister          Tobacco Use    Smoking status: Never Smoker    Smokeless tobacco: Never Used   Substance and Sexual Activity    Alcohol use: Yes     Alcohol/week: 0.0 standard drinks     Comment: only on occasion    Drug use: No    Sexual activity: Not Currently     Partners: Female     Review of Systems   Constitutional:  Positive for activity change and fever. Negative for chills.   HENT:  Negative for congestion, postnasal drip and sore throat.    Eyes:  Negative for photophobia.   Respiratory:  Negative for chest tightness and shortness of breath.    Cardiovascular:  Negative for chest pain.   Gastrointestinal:  Negative for abdominal distention, abdominal pain, blood in stool and vomiting.        Anorexia   Genitourinary:  Negative for dysuria, flank pain and hematuria.   Musculoskeletal:  Negative for back pain.        Contractures   Skin:  Negative for pallor.   Neurological:  Negative for dizziness, seizures, facial asymmetry, speech difficulty and numbness.        Contractures /atrophy   Limited mobility    Hematological:  Does not bruise/bleed easily.   Psychiatric/Behavioral:  Negative for agitation and suicidal ideas. The patient is not nervous/anxious.    Objective:     Vital " Signs (Most Recent):  Temp: 98.8 °F (37.1 °C) (06/20/22 0350)  Pulse: 75 (06/20/22 0350)  Resp: 19 (06/20/22 0350)  BP: 137/75 (06/20/22 0350)  SpO2: (!) 94 % (06/20/22 0654)   Vital Signs (24h Range):  Temp:  [98.8 °F (37.1 °C)-101.3 °F (38.5 °C)] 98.8 °F (37.1 °C)  Pulse:  [70-81] 75  Resp:  [18-20] 19  SpO2:  [94 %-98 %] 94 %  BP: (111-137)/(56-75) 137/75     Weight: 76 kg (167 lb 8.8 oz)  Body mass index is 25.48 kg/m².    Physical Exam  Vitals and nursing note reviewed.   Constitutional:       Appearance: He is well-developed.      Comments: Wife reports losing weight   HENT:      Head: Normocephalic and atraumatic.      Nose: Nose normal.   Eyes:      Conjunctiva/sclera: Conjunctivae normal.      Pupils: Pupils are equal, round, and reactive to light.   Neck:      Thyroid: No thyromegaly.      Vascular: No JVD.   Cardiovascular:      Rate and Rhythm: Normal rate and regular rhythm.      Heart sounds: Normal heart sounds.   Pulmonary:      Effort: Pulmonary effort is normal.      Breath sounds: Normal breath sounds.   Abdominal:      General: Bowel sounds are normal. There is no distension.      Palpations: Abdomen is soft. There is no mass.      Tenderness: There is no abdominal tenderness. There is no guarding.   Musculoskeletal:         General: Normal range of motion.      Cervical back: Normal range of motion and neck supple.      Comments: contractures   Lymphadenopathy:      Cervical: No cervical adenopathy.   Skin:     General: Skin is warm and dry.      Coloration: Skin is not pale.      Findings: No rash.   Neurological:      Mental Status: He is alert.      Cranial Nerves: No cranial nerve deficit.      Deep Tendon Reflexes: Reflexes are normal and symmetric.      Comments: Contractures ;curled up in bed .  Talks well .wife reports dementia          CRANIAL NERVES     CN III, IV, VI   Pupils are equal, round, and reactive to light.     Significant Labs: A1C:   Recent Labs   Lab 06/19/22  1900    HGBA1C 5.2     CBC:   Recent Labs   Lab 06/19/22 2033   WBC 7.18   HGB 9.2*   HCT 28.0*   *     CMP:   Recent Labs   Lab 06/19/22 2033      K 3.9   *   CO2 19*   *   BUN 42*   CREATININE 1.4   CALCIUM 8.8   PROT 5.9*   ALBUMIN 2.7*   BILITOT 0.5   ALKPHOS 62   AST 25   ALT 17   ANIONGAP 11   EGFRNONAA 53*       Urine Studies:   Recent Labs   Lab 06/19/22 2222   COLORU Yellow   APPEARANCEUA Cloudy*   PHUR 6.0   SPECGRAV 1.015   PROTEINUA 1+*   GLUCUA Negative   KETONESU Negative   BILIRUBINUA Negative   OCCULTUA 3+*   NITRITE Positive*   UROBILINOGEN 1.0   LEUKOCYTESUR 3+*   RBCUA >100*   WBCUA >100*   BACTERIA Many*   HYALINECASTS 0     Covid negative   Urine culture in process  Blood cultures in process  Flu negative     Significant Imaging:     X ray abd Moderate gaseous distention of the stomach.     Remote rib fractures of ribs 9 and 10 laterally on the left    CXR Borderline cardiomegaly.  No evidence of pulmonary edema.     Lucency in the upper abdomen.  Dedicated x-ray views of the abdomen may be obtained further evaluation    EKG Normal sinus rhythm  Early QRS transition  Minimal voltage criteria for LVH, may be normal variant  Nonspecific ST and/or T wave abnormalities  Abnormal ECG  When compared with ECG of 27-AUG-2021 16:53,  More normal precordial R wave progression  Confirmed by Brandan Melgar MD ([id number]) on 6/20/2022 9:59:15 AM

## 2022-06-20 NOTE — PT/OT/SLP EVAL
"Physical Therapy Evaluation    Patient Name:  Bessy Nunez   MRN:  [id number]    Recommendations:     Discharge Recommendations:  nursing facility, basic, home with home health   Discharge Equipment Recommendations: lift device   Barriers to discharge: Inaccessible home, Decreased caregiver support and was a [facility] resident. Family do not want patient to go back to [facility].    Assessment:     Bessy Nunez is a 62 y.o. male admitted with a medical diagnosis of Acute cystitis with hematuria.  He presents with the following impairments/functional limitations:  weakness, impaired endurance, impaired self care skills, impaired functional mobilty, gait instability, impaired balance, impaired cognition, decreased lower extremity function, decreased upper extremity function, decreased ROM, pain, impaired coordination, impaired fine motor, abnormal tone, decreased safety awareness. Patient seen at fetal position on right side with limited knees extension due to joint contractures.  Requires maximum assistance on bed mobility and repositioning. Tolerated sitting up at edge of the bed with contact guard assistance for balance. Unable to stand at this time.    Rehab Prognosis: Fair; patient would benefit from acute skilled PT services to address these deficits and reach maximum level of function.    Recent Surgery: * No surgery found *      Plan:     During this hospitalization, patient to be seen 5 x/week to address the identified rehab impairments via therapeutic activities, therapeutic exercises and progress toward the following goals:    · Plan of Care Expires:       Subjective     Chief Complaint: knees joint contractures - per son  Patient/Family Comments/goals: "to inc mobility and go to another nursing home" - per son  Pain/Comfort:  · Pain Rating 1:  (unable to quantify pain scale)  · Location - Side 1: Bilateral  · Location - Orientation 1: generalized  · Location 1: knee (pain upon ROM)  · Pain Addressed 1: " Reposition, Distraction, Cessation of Activity  · Pain Rating Post-Intervention 1: 0/10    Patients cultural, spiritual, Adventism conflicts given the current situation: no    Living Environment:  Nursing home resident at the [place] for ~ 11/2 years.  Prior to admission, patients level of function was wheelchair bound at the nursing home.  Equipment used at home: hospital bed.  DME owned (not currently used): hospital bed.  Upon discharge, patient will have assistance from nursing home staffs/family.    Objective:     Communicated with patient and son prior to session.  Patient found right sidelying with peripheral IV, telemetry  upon PT entry to room.    General Precautions: Standard, fall   Orthopedic Precautions:    Braces:    Respiratory Status: Room air    Exams:  · Cognitive Exam:  Patient is oriented to Person and self  · Fine Motor Coordination:    · -       Impaired  bilareal LE > on left side  · Gross Motor Coordination:  decrease bilareal LE > on left side  · Postural Exam:  Patient presented with the following abnormalities:    · -       fetal position on the bed with maximum knees flexion synergy pattern  · Skin Integrity/Edema:      · -       Skin integrity: Visible skin intact  · RLE ROM: WFL except - 35 degrees extension  · RLE Strength: 2/5  · LLE ROM: WFL except - 35 degrees extension  · LLE Strength: 2-/5    Functional Mobility:  · Bed Mobility:     · Rolling Left:  maximal assistance  · Rolling Right: maximal assistance  · Scooting: dependence  · Supine to Sit: dependence  · Sit to Supine: dependence  · Transfers:     · Sit to Stand:  unable   · Bed to Chair: unable  · Gait: unable  · Balance: sitting static: Fair- at EOB    Therapeutic Activities and Exercises:  Educated pt/son the proper ROM ex and initiated patient with proper bed repositioning..    AM-PAC 6 CLICK MOBILITY  Total Score:8     Patient left left sidelying with all lines intact, call button in reach, bed alarm on, nursing   notified and son present.    GOALS:   Multidisciplinary Problems     Physical Therapy Goals        Problem: Physical Therapy    Goal Priority Disciplines Outcome Goal Variances Interventions   Physical Therapy Goal     PT, PT/OT      Description:   Patient will increase functional independence with mobility by performin. Increase ROM bilateral Knees extension by 5- 10 degrees to prevent further joint contractures and ease body repositioning.  2. Increase bed mobility rolling to sides to minimal assistance  3. Supine <>sit to  moderate/minimal  assistance  and cues  4. Bed to chair transfer with maximal/moderate assistance.with slide/squat TECHNIQUE  5. Lower extremity exercise program x10 reps per handout, with assistance as needed                      History:     Past Medical History:   Diagnosis Date    Amblyopia     Cataract     CNS vasculitis 2017    Follows with Dr. Love By mri.  Several active lesions, many old. : MRA brain/neck, MRI brain w/wo contrast show no vascular occlusion, multiple foci of hyperintensity in deep cerebral periventricular white matter in pattern of demyelinating process.  : MRI spine performed, no spinal cord lesions. Hospitalization 2017. EEG was performed negative for seizures.  Repeat MRI showing new lesion, question whether this was demyelination versus CVA. Cytoxan 6/3/17 & 17    Depressive disorder, not elsewhere classified 2012    Essential hypertension 2012    Internuclear ophthalmoplegia of left eye 2017    Lacunar stroke of left subthalamic region 2017 MRI brain: 1. Findings compatible with a small acute lacunar infarct adjacent to the left frontal horn.  Nonspecific enhancement just inferior to this region and extending into the left basal ganglia, as well as within the inferior aspect of the left cerebellum.  No evidence of a focal mass. 2.  Extensive increased signal intensity involving the periventricular  white matter compatible with demyelinating disease versus vasculitis. 3.  Clinical correlation and followup recommended. 4.  This reportedly flattened in the EPIC and the corpus callosum medical record system.    Mixed hyperlipidemia 11/9/2012    NAFLD (nonalcoholic fatty liver disease) 10/11/2013    CHASE (nonalcoholic steatohepatitis)     Obesity     Stroke     Type 2 diabetes mellitus with diabetic polyneuropathy, with long-term current use of insulin 5/14/2017    Type 2 diabetes mellitus with hyperglycemia, with long-term current use of insulin 10/11/2013       Past Surgical History:   Procedure Laterality Date    COLONOSCOPY N/A 5/21/2019    Procedure: COLONOSCOPY;  Surgeon: Alex Ascencio MD;  Location: [facility];  Service: Endoscopy;  Laterality: N/A;  confirmed appt-sp    INSERTION OF TUNNELED CENTRAL VENOUS CATHETER (CVC) WITH SUBCUTANEOUS PORT N/A 12/7/2018    Procedure: [id number];  Surgeon: Luan Diagnostic Provider;  Location: [facility] OR [address] [place] [address];  Service: Radiology;  Laterality: N/A;    SKIN CANCER EXCISION         Time Tracking:     PT Received On: 06/20/22  PT Start Time: 1240     PT Stop Time: 1300  PT Total Time (min): 20 min     Billable Minutes: Evaluation 20 06/20/2022

## 2022-06-20 NOTE — PLAN OF CARE
[place] - Med Surg (3rd Fl)  Initial Discharge Assessment       Primary Care Provider: Edgar Nova MD    Admission Diagnosis: Failure to thrive in adult [R62.7]  Generalized weakness [R53.1]    Admission Date: 6/19/2022  Expected Discharge Date:     Discharge Barriers Identified: None    Payor: [employer]ARE / Plan: scrible CHOICES 65 / Product Type: Medicare Advantage /     Extended Emergency Contact Information  Primary Emergency Contact: Marky Nunez   [place]  Mobile Phone: [phone]  Relation: Son  Secondary Emergency Contact: Bekah Nunez  Address: [address] [place] [address] [place]  Mobile Phone: [phone]  Relation: Spouse    Discharge Plan A: Home with family, Home Health  Discharge Plan B: Return to Nursing Home      [facility] - [place] - [address]  [address] [facility] [address]  [facility] [address]  Phone: [phone] Fax: [phone]    [facility] - [place] - [address]  [address]  [facility] [address]  Phone: [phone] Fax: [phone]    [facility] [address] [place] [address]  Phone: [phone] Fax: [phone]      Initial Assessment (most recent)     Adult Discharge Assessment - 06/20/22 1056        Discharge Assessment    Assessment Type Discharge Planning Assessment     Confirmed/corrected address, phone number and insurance Yes     Confirmed Demographics Correct on Facesheet     Source of Information family;health record     Communicated BELIA with patient/caregiver Date not available/Unable to determine     Reason For Admission UTI     Lives With facility resident     Facility Arrived From: [facility]     Do you expect to return to your current living situation? Other (see comments)     Do you have help at home or someone to help you manage your care at home? Yes     Who are your caregiver(s) and their phone number(s)? Bekha Nunez (Spouse0  [phone]     Prior to hospitilization cognitive status: Unable to Assess     Current cognitive status: Unable to Assess     Walking or Climbing Stairs Difficulty transferring difficulty, dependent;ambulation difficulty, dependent     Dressing/Bathing Difficulty bathing difficulty, dependent;dressing difficulty, dependent     Equipment Currently Used at Home hospital bed     Readmission within 30 days? No     Patient currently being followed by outpatient case management? No     Do you currently have service(s) that help you manage your care at home? No     Do you take prescription medications? Yes     Do you have prescription coverage? Yes     Coverage [employer]     Do you have any problems affording any of your prescribed medications? No     How do you get to doctors appointments? agency     Are you on dialysis? No     Do you take coumadin? No     Discharge Plan A Home with family;Home Health     Discharge Plan B Return to Nursing Home     DME Needed Upon Discharge  other (see comments)   TBD    Discharge Plan discussed with: Spouse/sig other     Discharge Barriers Identified None                    Discharge assessment completed. Patient is a long-term resident of [facility] where he has been residing for the past year. Patient's spouse is unhappy with the care he is receiving as far as his feedings go with plans to take him home at discharge rather than return to [facility]. Patient is dependent for transferring x3 and spouse recently spent 8 days in the hospital for her own medical problems. It remains very unclear if spouse can care for patient at home safely. Furthermore, patient and [place] very limited resources for assistance due to location. Home health will be difficult to arrange because of this location. PT/OT/ST have been ordered. Discussion will resume with spouse once therapy has evaluated the patient's current functioning. Patient may also  be a good hospice candidate given his dementia and lack of eating. SW to remain available.

## 2022-06-20 NOTE — ED TRIAGE NOTES
62 y.o. male presents to ER ED 05/ED 05   Chief Complaint   Patient presents with    Failure To Thrive     Patient presents to ED via EMS from the [facility]; per ems patient has been declining over the past several months; patient's family states he has not been eating and is losing a lot of weight since his family member being in the hospital; cbg 243; temp 101 F given 500mls of IV fluids en route, 1g of tylenol given at 656pm; hx of MS, Dementia; patient at baseline mental status; NAD   . No acute distress noted.

## 2022-06-20 NOTE — PT/OT/SLP EVAL
Speech Language Pathology Evaluation  Bedside Swallow    Patient Name:  Bessy Nunez   MRN:  [id number]  Admitting Diagnosis: Acute cystitis with hematuria    Recommendations:                 General Recommendations:  SLP f/u for diet tolerance  Diet recommendations:  Regular, Regular Diet - IDDSI Level 7, Thin, Thin liquids - IDDSI Level 0   Aspiration Precautions: Oral care with toothbrush and toothpaste 3x/day, Assistance with meals, HOB to 90 degrees, Meds whole 1 at a time and Monitor for s/s of aspiration   General Precautions: Standard,    Communication strategies:  provide increased time to answer    History:     Past Medical History:   Diagnosis Date    Amblyopia     Cataract     CNS vasculitis 6/2/2017    Follows with Dr. Love By mri.  Several active lesions, many old. 5/13: MRA brain/neck, MRI brain w/wo contrast show no vascular occlusion, multiple foci of hyperintensity in deep cerebral periventricular white matter in pattern of demyelinating process.  5/17: MRI spine performed, no spinal cord lesions. Hospitalization 6/2017. EEG was performed negative for seizures.  Repeat MRI showing new lesion, question whether this was demyelination versus CVA. 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 Findings compatible with a small acute lacunar infarct adjacent to the left frontal horn.  Nonspecific enhancement just inferior to this region and extending into the left basal ganglia, as well as within the inferior aspect of the left cerebellum.  No evidence of a focal mass. 2.  Extensive increased signal intensity involving the periventricular white matter compatible with demyelinating disease versus vasculitis. 3.  Clinical correlation and followup recommended. 4.  This reportedly flattened in  the EPIC and the [facility] medical record system.    Mixed hyperlipidemia 11/9/2012    NAFLD (nonalcoholic fatty liver disease) 10/11/2013    CHASE (nonalcoholic steatohepatitis)     Obesity     Stroke     Type 2 diabetes mellitus with diabetic polyneuropathy, with long-term current use of insulin 5/14/2017    Type 2 diabetes mellitus with hyperglycemia, with long-term current use of insulin 10/11/2013       Past Surgical History:   Procedure Laterality Date    COLONOSCOPY N/A 5/21/2019    Procedure: COLONOSCOPY;  Surgeon: Alex Ascencio MD;  Location: [facility] ENDO;  Service: Endoscopy;  Laterality: N/A;  confirmed appt-sp    INSERTION OF TUNNELED CENTRAL VENOUS CATHETER (CVC) WITH SUBCUTANEOUS PORT N/A 12/7/2018    Procedure: JGAGRIWLG-AELI-U-CATH;  Surgeon: Luan Diagnostic Provider;  Location: [facility] OR [address] [place] [address];  Service: Radiology;  Laterality: N/A;    SKIN CANCER EXCISION         Social History: Patient lives at [facility].    Prior Intubation HX:  None per EMR     Modified Barium Swallow: None per EMR    Chest X-Rays: 6/19/2022  FINDINGS:  There is stable position right-sided chest port tip within the lower SVC.  There are postoperative changes in left shoulder.     The trachea is unremarkable.  The cardiomediastinal silhouette is borderline enlarged.  The stomach is distended.  There is a lucency in the right upper abdominal quadrant.  This presumably within bowel loops.     There no pleural effusions.  There is no evidence of a pneumothorax.  No airspace opacity is present.  There are degenerative changes in the osseous structures.     Impression:     Borderline cardiomegaly.  No evidence of pulmonary edema.     Lucency in the upper abdomen.  Dedicated x-ray views of the abdomen may be obtained further evaluation.       Prior diet: regular solids and thin liquids.    Subjective     Patient found sitting upright in bed, high Kinney's position, with son at bedside upon SLP arrival.     Respiratory Status:  Room air    Objective:     Oral Musculature Evaluation  · Oral Musculature: WFL  · Dentition: present and adequate  · Secretion Management: adequate  · Mucosal Quality: adequate  · Mandibular Strength and Mobility: WFL  · Oral Labial Strength and Mobility: impaired coordination, functional retraction, functional pursing, functional seal  · Lingual Strength and Mobility: WFL  · Velar Elevation: WFL  · Buccal Strength and Mobility: WFL  · Volitional Cough: perceptually productive  · Voice Prior to PO Intake: clear    Bedside Swallow Eval:   Consistencies Assessed:  · Thin liquids  - via consecutive straw sips of 3mL thin liquid   · Puree - via self fed bite of pudding on 5 mL spoon   · Solids - via self fed bite of 1 sqaure inch of randolph cracker     Oral Phase:   · mild lingual residue    Pharyngeal Phase:   · no overt clinical signs/symptoms of aspiration  · no overt clinical signs/symptoms of pharyngeal dysphagia    Compensatory Strategies  · None    Treatment: SLP provided education to patient and son regarding SLP role, recommendations, and plan of care. All verbalized understanding and agreement.     Assessment:     Bessy Nunez is a 62 y.o. male who presents with a functional swallow for continued PO intake. He presents with chronic dysphagia risk factors including DM 2, hx of dysphagia, and hx CVA. He presents with the acute dysphagia risk factor of UTI. He is at decreased risk for development of aspiration related pulmonary complications in the setting of adequate oral health status and perceptually intact laryngeal valve integrity. SLP to f/u x1 for diet tolerance.   Goals:   Multidisciplinary Problems     SLP Goals        Problem: SLP    Goal Priority Disciplines Outcome   SLP Goal     SLP Ongoing, Progressing   Description: Patient will maintain adequate nutrition and hydration while mitigating risk for dysphagia related complications by consuming the least restrictive diet and implementing risk  management strategies as outlined by SLP.                    Plan:     · Patient to be seen:  5 x/week   · Plan of Care expires:  06/27/22  · Plan of Care reviewed with:  patient, son   · SLP Follow-Up:  Yes       Discharge recommendations:  other (see comments) (TBD)   Barriers to Discharge:  None    Time Tracking:     SLP Treatment Date:   06/20/22  Speech Start Time:  1328  Speech Stop Time:  1342     Speech Total Time (min):  14 min    Billable Minutes: Eval Swallow and Oral Function  - 14 min      Ashley Britton CCC-SLP    06/20/2022

## 2022-06-20 NOTE — PLAN OF CARE
Admitted to floor awake, alert and oriented to person only via stretcher in NAD, accompanied by wife. Transferred from stretcher to bed with 3 assist.   Noted contractures and pressure injuries documented in epic.  Will turn every 2hrs, encouraged po fluids and meals.      Problem: Adult Inpatient Plan of Care  Goal: Plan of Care Review  6/20/2022 0529 by Cris James RN  Outcome: Ongoing, Progressing  6/20/2022 0527 by Cris James RN  Outcome: Ongoing, Progressing     Problem: Diabetes Comorbidity  Goal: Blood Glucose Level Within Targeted Range  6/20/2022 0529 by Cris James RN  Outcome: Ongoing, Progressing  6/20/2022 0527 by Cris James RN  Outcome: Ongoing, Progressing     Problem: Fluid and Electrolyte Imbalance (Acute Kidney Injury/Impairment)  Goal: Fluid and Electrolyte Balance  6/20/2022 0529 by Cris James RN  Outcome: Ongoing, Progressing  6/20/2022 0527 by Cris James RN  Outcome: Ongoing, Progressing     Problem: Oral Intake Inadequate (Acute Kidney Injury/Impairment)  Goal: Optimal Nutrition Intake  6/20/2022 0529 by Cris James RN  Outcome: Ongoing, Progressing  6/20/2022 0527 by Cris James RN  Outcome: Ongoing, Progressing     Problem: Renal Function Impairment (Acute Kidney Injury/Impairment)  Goal: Effective Renal Function  6/20/2022 0529 by Cris James RN  Outcome: Ongoing, Progressing  6/20/2022 0527 by Cris James RN  Outcome: Ongoing, Progressing     Problem: Infection  Goal: Absence of Infection Signs and Symptoms  6/20/2022 0529 by Cris James RN  Outcome: Ongoing, Progressing  6/20/2022 0527 by Cris James RN  Outcome: Ongoing, Progressing     Problem: Skin Injury Risk Increased  Goal: Skin Health and Integrity  6/20/2022 0529 by Cris James RN  Outcome: Ongoing, Progressing  6/20/2022 0527 by Cris James RN  Outcome: Ongoing, Progressing     Problem: Impaired Wound Healing  Goal: Optimal Wound  Healing  6/20/2022 0529 by Cris James RN  Outcome: Ongoing, Progressing  6/20/2022 0527 by Cris James RN  Outcome: Ongoing, Progressing     Problem: Cognitive Impairment (Functional Deficit)  Goal: Optimal Functional [place]  Outcome: Ongoing, Progressing     Problem: Muscle Strength Impairment (Functional Deficit)  Goal: Improved Muscle Strength  Outcome: Ongoing, Progressing     Problem: Range of Motion Impairment (Functional Deficit)  Goal: Optimal Range of Motion  Outcome: Ongoing, Progressing

## 2022-06-20 NOTE — ASSESSMENT & PLAN NOTE
We discussed his lapost ; I agree with DNR   Wife and patient discussed his advance directive   DNR

## 2022-06-20 NOTE — ED PROVIDER NOTES
Encounter Date: 6/19/2022       History     Chief Complaint   Patient presents with    Failure To Thrive     Patient presents to ED via EMS from the [facility]; per ems patient has been declining over the past several months; patient's family states he has not been eating and is losing a lot of weight since his family member being in the hospital; cbg 243; temp 101 F given 500mls of IV fluids en route, 1g of tylenol given at 656pm; hx of MS, Dementia; patient at baseline mental status; NAD     62-year-old male with history of previous stroke, hypertension, nonalcoholic fatty liver disease, bedbound, presenting from the [facility] with failure to thrive and fever.  Patient was given Tylenol prior to arrival.  Family has been in the hospital for the last week, and has not been visiting him.  They stated that he has not been eating and is losing weight.  Patient has no complaints at this time, no abdominal pain, chest pain, shortness breath, cough, congestion.  Patient denies any urinary symptoms, but has a catheter in place.        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 Love By mri.  Several active lesions, many old. 5/13: MRA brain/neck, MRI brain w/wo contrast show no vascular occlusion, multiple foci of hyperintensity in deep cerebral periventricular white matter in pattern of demyelinating process.  5/17: MRI spine performed, no spinal cord lesions. Hospitalization 6/2017. EEG was performed negative for seizures.  Repeat MRI showing new lesion, question whether this was demyelination versus CVA. 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 Findings compatible with a small  acute lacunar infarct adjacent to the left frontal horn.  Nonspecific enhancement just inferior to this region and extending into the left basal ganglia, as well as within the inferior aspect of the left cerebellum.  No evidence of a focal mass. 2.  Extensive increased signal intensity involving the periventricular white matter compatible with demyelinating disease versus vasculitis. 3.  Clinical correlation and followup recommended. 4.  This reportedly flattened in the EPIC and the [facility] medical record system.    Mixed hyperlipidemia 11/9/2012    NAFLD (nonalcoholic fatty liver disease) 10/11/2013    CHASE (nonalcoholic steatohepatitis)     Obesity     Stroke     Type 2 diabetes mellitus with diabetic polyneuropathy, with long-term current use of insulin 5/14/2017    Type 2 diabetes mellitus with hyperglycemia, with long-term current use of insulin 10/11/2013     Past Surgical History:   Procedure Laterality Date    COLONOSCOPY N/A 5/21/2019    Procedure: COLONOSCOPY;  Surgeon: Alex Ascencio MD;  Location: [facility];  Service: Endoscopy;  Laterality: N/A;  confirmed appt-sp    INSERTION OF TUNNELED CENTRAL VENOUS CATHETER (CVC) WITH SUBCUTANEOUS PORT N/A 12/7/2018    Procedure: [id number]-PPRE-X-CATH;  Surgeon: Luan Diagnostic Provider;  Location: [facility] OR [address] [place] [address];  Service: Radiology;  Laterality: N/A;    SKIN CANCER EXCISION       Family History   Problem Relation Age of Onset    Heart disease Mother     Hypertension Mother     Heart failure Mother     Cataracts Mother     Cancer Father         lung    Diabetes Maternal Aunt     Stroke Sister     Rheum arthritis Paternal Grandmother     Amblyopia Neg Hx     Blindness Neg Hx     Glaucoma Neg Hx     Macular degeneration Neg Hx     Retinal detachment Neg Hx     Strabismus Neg Hx      Social History     Tobacco Use    Smoking status: Never Smoker    Smokeless tobacco: Never Used   Substance Use Topics    Alcohol use: Yes      Alcohol/week: 0.0 standard drinks     Comment: only on occasion    Drug use: No     Review of Systems   Constitutional: Positive for fatigue and fever.   HENT: Negative for sore throat.    Respiratory: Negative for shortness of breath.    Cardiovascular: Negative for chest pain.   Gastrointestinal: Negative for nausea.   Genitourinary: Negative for dysuria.   Musculoskeletal: Negative for back pain.   Skin: Negative for rash.   Neurological: Negative for weakness.   Hematological: Does not bruise/bleed easily.       Physical Exam     Initial Vitals   BP Pulse Resp Temp SpO2   06/19/22 1952 06/19/22 1948 06/19/22 1948 06/19/22 1948 06/19/22 1948   (!) 111/58 81 20 (!) 101.3 °F (38.5 °C) 95 %      MAP       --                Physical Exam    Nursing note and vitals reviewed.  Constitutional: He is not diaphoretic. No distress.   Eyes: EOM are normal.   Neck:   Normal range of motion.  Cardiovascular:   No murmur heard.  Pulmonary/Chest: No respiratory distress.   Clear lungs bilaterally.  No respiratory distress.  No wheezing or rales.  Good air movement.     Abdominal: He exhibits no distension.   No abdominal tenderness.  No rebound or guarding.   Genitourinary:    Genitourinary Comments: Catheter in place.     Musculoskeletal:         General: Normal range of motion.      Cervical back: Normal range of motion.     Neurological: He is alert and oriented to person, place, and time.   Skin: Skin is warm.   Psychiatric: He has a normal mood and affect.         ED Course   Procedures  Labs Reviewed   CBC W/ AUTO DIFFERENTIAL - Abnormal; Notable for the following components:       Result Value    RBC 3.13 (*)     Hemoglobin 9.2 (*)     Hematocrit 28.0 (*)     RDW 15.5 (*)     Platelets 129 (*)     Lymph # 0.9 (*)     Gran % 76.1 (*)     Lymph % 13.1 (*)     All other components within normal limits   COMPREHENSIVE METABOLIC PANEL - Abnormal; Notable for the following components:    Chloride 115 (*)     CO2 19 (*)      Glucose 300 (*)     BUN 42 (*)     Total Protein 5.9 (*)     Albumin 2.7 (*)     eGFR if non  53 (*)     All other components within normal limits   INFLUENZA A & B BY MOLECULAR   SARS-COV-2 RNA AMPLIFICATION, QUAL   URINALYSIS, REFLEX TO URINE CULTURE     EKG Readings: (Independently Interpreted)   Initial Reading: No STEMI. Rhythm: Normal Sinus Rhythm. Heart Rate: 77. Ectopy: No Ectopy. Conduction: Normal.       Imaging Results          X-Ray Abdomen AP 1 View (KUB) (In process)                X-Ray Chest AP Portable (Final result)  Result time 06/19/22 20:42:30    Final result by Adolfo Cartagena MD (06/19/22 20:42:30)                 Impression:      Borderline cardiomegaly.  No evidence of pulmonary edema.    Lucency in the upper abdomen.  Dedicated x-ray views of the abdomen may be obtained further evaluation.      Electronically signed by: Adolfo Cartagena MD  Date:    06/19/2022  Time:    20:42             Narrative:    EXAMINATION:  XR CHEST AP PORTABLE    CLINICAL HISTORY:  generalized weakness;    TECHNIQUE:  Single frontal view of the chest was performed.    COMPARISON:  02/14/2022.    FINDINGS:  There is stable position right-sided chest port tip within the lower SVC.  There are postoperative changes in left shoulder.    The trachea is unremarkable.  The cardiomediastinal silhouette is borderline enlarged.  The stomach is distended.  There is a lucency in the right upper abdominal quadrant.  This presumably within bowel loops.    There no pleural effusions.  There is no evidence of a pneumothorax.  No airspace opacity is present.  There are degenerative changes in the osseous structures.                                 Medications   acetaminophen tablet 1,000 mg (0 mg Oral Hold 6/19/22 2045)   sodium chloride 0.9% bolus 1,000 mL (0 mLs Intravenous Stopped 6/19/22 2158)     Medical Decision Making:   Differential Diagnosis:   DDX: Generalized weakness - r/o infection, significant anemia, ACS,  arrhythmia, electrolyte abnormality, MANISH, metabolic abnormality. Unlikely ICH/mass given nonfocal neuro exam, no concerning history, but will consider if change in exam.  DX: BMP, CBC, EKG. CXR, UA. NCHCT if change in neuro exam/symptoms.  TX: Analgesia PRN. Treatment/consult as indicated by studies.   DISPO: Pending studies.             ED Course as of 06/19/22 2221   Sun Jun 19, 2022 2025 Failure to thrive wallet [facility] of the last five days.  Not eating.  Dehydrated, fever.  Denies any discomfort. [NB]      ED Course User Index  [NB] Leo Cisse MD             Clinical Impression:   Final diagnoses:  [R53.1] Generalized weakness  [R62.7] Failure to thrive in adult                 Leo Cisse MD  06/19/22 2204       Leo Cisse MD  06/19/22 2221

## 2022-06-20 NOTE — H&P
[facility] ([address] [place] [address] Medicine  History & Physical    Patient Name: Bessy Nunez  MRN: [id number]  Patient Class: OP- Observation  Admission Date: 6/19/2022  Attending Physician: Fabio Rocha MD   Primary Care Provider: Edgar Nova MD         Patient information was obtained from patient, caregiver / friend and ER records.     Subjective:     Principal Problem:Acute cystitis with hematuria    Chief Complaint:   Chief Complaint   Patient presents with    Failure To Thrive     Patient presents to ED via EMS from the [facility]; per ems patient has been declining over the past several months; patient's family states he has not been eating and is losing a lot of weight since his family member being in the hospital; cbg 243; temp 101 F given 500mls of IV fluids en route, 1g of tylenol given at 656pm; hx of MS, Dementia; patient at baseline mental status; NAD        HPI: Bessy Nunez is a 62 y.o. male  patient with hx of DM 2, NAFLD, HLD, CVA, HTN, depression, and CNS vasculitis-MS. HIs wife is at bedside and gives history. She reports that she see him in nursing home twice a day and yesterday he was refusing to eat and was weak. She asked for vitals and he had a fever and low BP. He denies dysuria. Was given 2L NS in ER and started on rocephin for UTI. VSS/101.3 t max . NO eleavted WBC- urine infected culture pending. Also reports that he has become contracted in bed at nursing home. She is not happy with care there. Discussing options for d/c from here with case management, Discussed DNR- La post completed . He will be DNR .      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 Love By mri.  Several active lesions, many old. 5/13: MRA brain/neck, MRI brain w/wo contrast show no vascular occlusion, multiple foci of hyperintensity in deep cerebral periventricular white matter in pattern of demyelinating process.  5/17: MRI spine  performed, no spinal cord lesions. Hospitalization 6/2017. EEG was performed negative for seizures.  Repeat MRI showing new lesion, question whether this was demyelination versus CVA. 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 Findings compatible with a small acute lacunar infarct adjacent to the left frontal horn.  Nonspecific enhancement just inferior to this region and extending into the left basal ganglia, as well as within the inferior aspect of the left cerebellum.  No evidence of a focal mass. 2.  Extensive increased signal intensity involving the periventricular white matter compatible with demyelinating disease versus vasculitis. 3.  Clinical correlation and followup recommended. 4.  This reportedly flattened in the EPIC and the corpus callosum medical record system.    Mixed hyperlipidemia 11/9/2012    NAFLD (nonalcoholic fatty liver disease) 10/11/2013    CHASE (nonalcoholic steatohepatitis)     Obesity     Stroke     Type 2 diabetes mellitus with diabetic polyneuropathy, with long-term current use of insulin 5/14/2017    Type 2 diabetes mellitus with hyperglycemia, with long-term current use of insulin 10/11/2013       Past Surgical History:   Procedure Laterality Date    COLONOSCOPY N/A 5/21/2019    Procedure: COLONOSCOPY;  Surgeon: Alex Ascencio MD;  Location: [facility];  Service: Endoscopy;  Laterality: N/A;  confirmed appt-sp    INSERTION OF TUNNELED CENTRAL VENOUS CATHETER (CVC) WITH SUBCUTANEOUS PORT N/A 12/7/2018    Procedure: [id number]-EOTO-Y-CATH;  Surgeon: Luan Diagnostic Provider;  Location: [address];  Service: Radiology;  Laterality: N/A;    SKIN CANCER EXCISION         Review of patient's allergies indicates:  No Known Allergies    No current  "facility-administered medications on file prior to encounter.     Current Outpatient Medications on File Prior to Encounter   Medication Sig    ascorbic acid, vitamin C, (VITAMIN C) 500 MG tablet Take 500 mg by mouth once daily.    aspirin 81 MG Chew Take 81 mg by mouth once daily.    atorvastatin (LIPITOR) 40 MG tablet Take 1 tablet (40 mg total) by mouth once daily. for 7 days    carvediloL (COREG) 6.25 MG tablet Take 1 tablet (6.25 mg total) by mouth 2 (two) times daily. for 7 days    donepeziL (ARICEPT) 5 MG tablet Take 1 tablet (5 mg total) by mouth every evening. for 7 days    ferrous sulfate (FEOSOL) 325 mg (65 mg iron) Tab tablet Take 325 mg by mouth daily with breakfast.    flash glucose sensor (FREESTYLE ARELY 14 DAY SENSOR) Kit USE   TO CHECK GLUCOSE 4 TIMES DAILY    FREESTYLE ARELY 14 DAY READER Misc GLUCOSE TESTING : FASTING AND PRIOR TO MEALS    insulin glargine (LANTUS) 100 unit/mL injection Inject 10 Units into the skin every evening.    irbesartan (AVAPRO) 300 MG tablet Take 1 tablet (300 mg total) by mouth every evening. for 7 days    lactobacillus combination no.4 (PROBIOTIC) 3 billion cell Cap Take 1 capsule by mouth once daily.    lancets 30 gauge Misc Test blood sugar four times a day    omega-3 fatty acids-vitamin E (FISH OIL) 1,000 mg Cap Take 1 capsule by mouth 2 (two) times daily.    oxybutynin (DITROPAN) 5 MG Tab Take 10 mg by mouth once daily.    oxybutynin (DITROPAN-XL) 5 MG TR24 Take 1 tablet (5 mg total) by mouth once daily. for 7 days    pantoprazole (PROTONIX) 40 MG tablet Take 1 tablet (40 mg total) by mouth 2 (two) times daily. for 7 days    pen needle, diabetic 32 gauge x 5/32" Ndle USE 1 PEN NEEDLE 4 TIMES DAILY (  SINGLE  USE)    sertraline (ZOLOFT) 100 MG tablet Take 2 tablets (200 mg total) by mouth once daily. for 7 days    vitamin D 1000 units Tab Take 5 tablets (5,000 Units total) by mouth once daily.    alendronate (FOSAMAX) 70 MG tablet Take 1 tablet " (70 mg total) by mouth every 7 days.    QUEtiapine (SEROQUEL) 25 MG Tab Take 1 tablet (25 mg total) by mouth nightly as needed (sleep). (Patient not taking: Reported on 6/19/2022)     Family History       Problem Relation (Age of Onset)    Cancer Father    Cataracts Mother    Diabetes Maternal Aunt    Heart disease Mother    Heart failure Mother    Hypertension Mother    Rheum arthritis Paternal Grandmother    Stroke Sister          Tobacco Use    Smoking status: Never Smoker    Smokeless tobacco: Never Used   Substance and Sexual Activity    Alcohol use: Yes     Alcohol/week: 0.0 standard drinks     Comment: only on occasion    Drug use: No    Sexual activity: Not Currently     Partners: Female     Review of Systems   Constitutional:  Positive for activity change and fever. Negative for chills.   HENT:  Negative for congestion, postnasal drip and sore throat.    Eyes:  Negative for photophobia.   Respiratory:  Negative for chest tightness and shortness of breath.    Cardiovascular:  Negative for chest pain.   Gastrointestinal:  Negative for abdominal distention, abdominal pain, blood in stool and vomiting.        Anorexia   Genitourinary:  Negative for dysuria, flank pain and hematuria.   Musculoskeletal:  Negative for back pain.        Contractures   Skin:  Negative for pallor.   Neurological:  Negative for dizziness, seizures, facial asymmetry, speech difficulty and numbness.        Contractures /atrophy   Limited mobility    Hematological:  Does not bruise/bleed easily.   Psychiatric/Behavioral:  Negative for agitation and suicidal ideas. The patient is not nervous/anxious.    Objective:     Vital Signs (Most Recent):  Temp: 98.8 °F (37.1 °C) (06/20/22 0350)  Pulse: 75 (06/20/22 0350)  Resp: 19 (06/20/22 0350)  BP: 137/75 (06/20/22 0350)  SpO2: (!) 94 % (06/20/22 0654)   Vital Signs (24h Range):  Temp:  [98.8 °F (37.1 °C)-101.3 °F (38.5 °C)] 98.8 °F (37.1 °C)  Pulse:  [70-81] 75  Resp:  [18-20] 19  SpO2:   [94 %-98 %] 94 %  BP: (111-137)/(56-75) 137/75     Weight: 76 kg (167 lb 8.8 oz)  Body mass index is 25.48 kg/m².    Physical Exam  Vitals and nursing note reviewed.   Constitutional:       Appearance: He is well-developed.      Comments: Wife reports losing weight   HENT:      Head: Normocephalic and atraumatic.      Nose: Nose normal.   Eyes:      Conjunctiva/sclera: Conjunctivae normal.      Pupils: Pupils are equal, round, and reactive to light.   Neck:      Thyroid: No thyromegaly.      Vascular: No JVD.   Cardiovascular:      Rate and Rhythm: Normal rate and regular rhythm.      Heart sounds: Normal heart sounds.   Pulmonary:      Effort: Pulmonary effort is normal.      Breath sounds: Normal breath sounds.   Abdominal:      General: Bowel sounds are normal. There is no distension.      Palpations: Abdomen is soft. There is no mass.      Tenderness: There is no abdominal tenderness. There is no guarding.   Musculoskeletal:         General: Normal range of motion.      Cervical back: Normal range of motion and neck supple.      Comments: contractures   Lymphadenopathy:      Cervical: No cervical adenopathy.   Skin:     General: Skin is warm and dry.      Coloration: Skin is not pale.      Findings: No rash.   Neurological:      Mental Status: He is alert.      Cranial Nerves: No cranial nerve deficit.      Deep Tendon Reflexes: Reflexes are normal and symmetric.      Comments: Contractures ;curled up in bed .  Talks well .wife reports dementia          CRANIAL NERVES     CN III, IV, VI   Pupils are equal, round, and reactive to light.     Significant Labs: A1C:   Recent Labs   Lab 06/19/22  1900   HGBA1C 5.2     CBC:   Recent Labs   Lab 06/19/22 2033   WBC 7.18   HGB 9.2*   HCT 28.0*   *     CMP:   Recent Labs   Lab 06/19/22 2033      K 3.9   *   CO2 19*   *   BUN 42*   CREATININE 1.4   CALCIUM 8.8   PROT 5.9*   ALBUMIN 2.7*   BILITOT 0.5   ALKPHOS 62   AST 25   ALT 17   ANIONGAP 11    EGFRNONAA 53*       Urine Studies:   Recent Labs   Lab 06/19/22  2222   COLORU Yellow   APPEARANCEUA Cloudy*   PHUR 6.0   SPECGRAV 1.015   PROTEINUA 1+*   GLUCUA Negative   KETONESU Negative   BILIRUBINUA Negative   OCCULTUA 3+*   NITRITE Positive*   UROBILINOGEN 1.0   LEUKOCYTESUR 3+*   RBCUA >100*   WBCUA >100*   BACTERIA Many*   HYALINECASTS 0     Covid negative   Urine culture in process  Blood cultures in process  Flu negative     Significant Imaging:     X ray abd Moderate gaseous distention of the stomach.     Remote rib fractures of ribs 9 and 10 laterally on the left    CXR Borderline cardiomegaly.  No evidence of pulmonary edema.     Lucency in the upper abdomen.  Dedicated x-ray views of the abdomen may be obtained further evaluation    EKG Normal sinus rhythm  Early QRS transition  Minimal voltage criteria for LVH, may be normal variant  Nonspecific ST and/or T wave abnormalities  Abnormal ECG  When compared with ECG of 27-AUG-2021 16:53,  More normal precordial R wave progression  Confirmed by Rmaón SANTOS, Brandan ([id number]) on 6/20/2022 9:59:15 AM    Assessment/Plan:     * Acute cystitis with hematuria  Rocephin started in ER 6/19-- cont  Follow urine culture.      Hemiplegia as late effect of cerebrovascular accident (CVA)  Cont statin and asa.      Debility    PT/OT/ST.    Dementia with behavioral disturbance  Resume aricept .      Advance care planning  We discussed his lapost ; I agree with DNR   Wife and patient discussed his advance directive   DNR       Controlled type 2 diabetes mellitus with diabetic nephropathy, with long-term current use of insulin; metformin intolerable diarrhea  Hold lantus 10 units daily and give SSI low dose for now- reduced appetite reported with recent weight loss.      Essential hypertension 03/09/2020 TTE concentric LV remodeling.  Normal LV systolic function LVEF 55%.  Grade 2 diastolic dysfunction.  Normal CVP.  PA pressure 20.  Cont coreg for now.   Hold ARB.      VTE Risk  Mitigation (From admission, onward)         Ordered     enoxaparin injection 40 mg  Daily         06/20/22 1006     IP VTE LOW RISK PATIENT  Once         06/20/22 0033     Place sequential compression device  Until discontinued         06/20/22 0033                   Helen Rivera MD  Department of Hospital Medicine   [place] [facility] (3rd Fl)

## 2022-06-20 NOTE — HPI
Bessy Nunez is a 62 y.o. male  patient with hx of DM 2, NAFLD, HLD, CVA, HTN, depression, and CNS vasculitis-MS. HIs wife is at bedside and gives history. She reports that she see him in nursing home twice a day and yesterday he was refusing to eat and was weak. She asked for vitals and he had a fever and low BP. He denies dysuria. Was given 2L NS in ER and started on rocephin for UTI. VSS/101.3 t max . NO eleavted WBC- urine infected culture pending. Also reports that he has become contracted in bed at nursing home. She is not happy with care there. Discussing options for d/c from here with case management, Discussed DNR- La post completed . He will be DNR .

## 2022-06-21 ENCOUNTER — PATIENT OUTREACH (OUTPATIENT)
Dept: ADMINISTRATIVE | Facility: HOSPITAL | Age: 62
End: 2022-06-21
Payer: MEDICARE

## 2022-06-21 VITALS
TEMPERATURE: 98 F | SYSTOLIC BLOOD PRESSURE: 130 MMHG | RESPIRATION RATE: 19 BRPM | BODY MASS INDEX: 25.39 KG/M2 | DIASTOLIC BLOOD PRESSURE: 71 MMHG | HEART RATE: 56 BPM | OXYGEN SATURATION: 99 % | WEIGHT: 167.56 LBS | HEIGHT: 68 IN

## 2022-06-21 LAB
POCT GLUCOSE: 170 MG/DL (ref 70–110)
POCT GLUCOSE: 181 MG/DL (ref 70–110)
SARS-COV-2 RDRP RESP QL NAA+PROBE: NEGATIVE

## 2022-06-21 PROCEDURE — 94760 N-INVAS EAR/PLS OXIMETRY 1: CPT

## 2022-06-21 PROCEDURE — 63600175 PHARM REV CODE 636 W HCPCS: Performed by: NURSE PRACTITIONER

## 2022-06-21 PROCEDURE — 99225 PR SUBSEQUENT OBSERVATION CARE,LEVEL II: ICD-10-PCS | Mod: ,,, | Performed by: INTERNAL MEDICINE

## 2022-06-21 PROCEDURE — 97530 THERAPEUTIC ACTIVITIES: CPT

## 2022-06-21 PROCEDURE — 99225 PR SUBSEQUENT OBSERVATION CARE,LEVEL II: CPT | Mod: ,,, | Performed by: INTERNAL MEDICINE

## 2022-06-21 PROCEDURE — 25000003 PHARM REV CODE 250: Performed by: NURSE PRACTITIONER

## 2022-06-21 PROCEDURE — 97166 OT EVAL MOD COMPLEX 45 MIN: CPT

## 2022-06-21 PROCEDURE — 96366 THER/PROPH/DIAG IV INF ADDON: CPT

## 2022-06-21 PROCEDURE — U0002 COVID-19 LAB TEST NON-CDC: HCPCS | Performed by: INTERNAL MEDICINE

## 2022-06-21 PROCEDURE — 96361 HYDRATE IV INFUSION ADD-ON: CPT

## 2022-06-21 PROCEDURE — 25000003 PHARM REV CODE 250: Performed by: INTERNAL MEDICINE

## 2022-06-21 PROCEDURE — G0378 HOSPITAL OBSERVATION PER HR: HCPCS

## 2022-06-21 RX ORDER — CEPHALEXIN 500 MG/1
500 CAPSULE ORAL EVERY 8 HOURS
Qty: 12 CAPSULE | Refills: 0 | Status: SHIPPED | OUTPATIENT
Start: 2022-06-22 | End: 2022-06-26

## 2022-06-21 RX ADMIN — PANTOPRAZOLE SODIUM 40 MG: 40 TABLET, DELAYED RELEASE ORAL at 09:06

## 2022-06-21 RX ADMIN — CEFTRIAXONE 1 G: 1 INJECTION, SOLUTION INTRAVENOUS at 12:06

## 2022-06-21 RX ADMIN — ATORVASTATIN CALCIUM 40 MG: 40 TABLET, FILM COATED ORAL at 09:06

## 2022-06-21 RX ADMIN — CARVEDILOL 6.25 MG: 3.12 TABLET, FILM COATED ORAL at 09:06

## 2022-06-21 RX ADMIN — OXYBUTYNIN CHLORIDE 5 MG: 5 TABLET, EXTENDED RELEASE ORAL at 09:06

## 2022-06-21 RX ADMIN — ASPIRIN 81 MG: 81 TABLET, CHEWABLE ORAL at 09:06

## 2022-06-21 RX ADMIN — OXYCODONE HYDROCHLORIDE AND ACETAMINOPHEN 500 MG: 500 TABLET ORAL at 09:06

## 2022-06-21 RX ADMIN — FERROUS SULFATE TAB 325 MG (65 MG ELEMENTAL FE) 1 EACH: 325 (65 FE) TAB at 09:06

## 2022-06-21 RX ADMIN — SERTRALINE 200 MG: 100 TABLET, FILM COATED ORAL at 09:06

## 2022-06-21 RX ADMIN — Medication 5000 UNITS: at 09:06

## 2022-06-21 NOTE — PT/OT/SLP PROGRESS
"Physical Therapy Treatment    Patient Name:  Bessy Nunez   MRN:  [id number]    Recommendations:     Discharge Recommendations:  nursing facility, basic, nursing facility, skilled   Discharge Equipment Recommendations: lift device, wheelchair   Barriers to discharge: Inaccessible home, Decreased caregiver support and family does not want patient to go back to [facility]    Assessment:     Bessy Nunez is a 62 y.o. male admitted with a medical diagnosis of Acute cystitis with hematuria.  He presents with the following impairments/functional limitations:  weakness, impaired endurance, impaired self care skills, impaired functional mobilty, gait instability, impaired balance, impaired cognition, decreased coordination, decreased upper extremity function, decreased lower extremity function, decreased safety awareness, pain, decreased ROM, impaired coordination, impaired joint extensibility. Patient tolerated gentle PROM ex with combination of stretching.  Followed by sitting up at edge of the bed for repositioning and sitting balance trng.    Rehab Prognosis: Fair; patient would benefit from acute skilled PT services to address these deficits and reach maximum level of function.    Recent Surgery: * No surgery found *      Plan:     During this hospitalization, patient to be seen 5 x/week to address the identified rehab impairments via therapeutic activities, therapeutic exercises and progress toward the following goals:    · Plan of Care Expires:       Subjective     Chief Complaint: dec mobility  Patient/Family Comments/goals: "To go to another nursing home".  Pain/Comfort:  · Pain Rating 1:  (unable to quantify pain scale on left knee upon movement)  · Location - Side 1: Left  · Location - Orientation 1: generalized  · Location 1: knee  · Pain Addressed 1: Reposition, Cessation of Activity, Distraction  · Pain Rating Post-Intervention 1: 0/10      Objective:     Communicated with patient and wife prior to session.  " Patient found right sidelying with peripheral IV, telemetry upon PT entry to room.     General Precautions: Standard, fall   Orthopedic Precautions:    Braces: N/A  Respiratory Status: Room air     Functional Mobility:  · Bed Mobility:     · Rolling Left:  maximal assistance  · Rolling Right: maximal assistance  · Scooting: dependence  · Supine to Sit: maximal assistance  · Sit to Supine: maximal assistance  · Balance: Static  sitting: Fair- with close SBA      AM-PAC 6 CLICK MOBILITY  Turning over in bed (including adjusting bedclothes, sheets and blankets)?: 2  Sitting down on and standing up from a chair with arms (e.g., wheelchair, bedside commode, etc.): 1  Moving from lying on back to sitting on the side of the bed?: 2  Moving to and from a bed to a chair (including a wheelchair)?: 1  Need to walk in hospital room?: 1  Climbing 3-5 steps with a railing?: 1  Basic Mobility Total Score: 8       Therapeutic Activities and Exercises:   Provided with PROM ex with gentle stretching on bilateral LE, bed repositioning and sitting balance and augustine at edge of the bed.    Patient left HOB elevated with all lines intact, call button in reach, bed alarm on, nursing  notified and wife present..    GOALS:   Multidisciplinary Problems     Physical Therapy Goals        Problem: Physical Therapy    Goal Priority Disciplines Outcome Goal Variances Interventions   Physical Therapy Goal     PT, PT/OT      Description:   Patient will increase functional independence with mobility by performin. Increase ROM bilateral Knees extension by 5- 10 degrees to prevent further joint contractures and ease body repositioning.  2. Increase bed mobility rolling to sides to minimal assistance  3. Supine <>sit to  moderate/minimal  assistance  and cues  4. Bed to chair transfer with maximal/moderate assistance.with slide/squat TECHNIQUE  5. Lower extremity exercise program x10 reps per handout, with assistance as needed                       Time Tracking:     PT Received On: 06/21/22  PT Start Time: 0745     PT Stop Time: 0805  PT Total Time (min): 20 min     Billable Minutes: Therapeutic Activity 20     Treatment Type: Treatment  PT/PTA: PT           06/21/2022

## 2022-06-21 NOTE — SUBJECTIVE & OBJECTIVE
Past Medical History:   Diagnosis Date    Amblyopia     Cataract     CNS vasculitis 6/2/2017    Follows with Dr. Love By mri.  Several active lesions, many old. 5/13: MRA brain/neck, MRI brain w/wo contrast show no vascular occlusion, multiple foci of hyperintensity in deep cerebral periventricular white matter in pattern of demyelinating process.  5/17: MRI spine performed, no spinal cord lesions. Hospitalization 6/2017. EEG was performed negative for seizures.  Repeat MRI showing new lesion, question whether this was demyelination versus CVA. 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 Findings compatible with a small acute lacunar infarct adjacent to the left frontal horn.  Nonspecific enhancement just inferior to this region and extending into the left basal ganglia, as well as within the inferior aspect of the left cerebellum.  No evidence of a focal mass. 2.  Extensive increased signal intensity involving the periventricular white matter compatible with demyelinating disease versus vasculitis. 3.  Clinical correlation and followup recommended. 4.  This reportedly flattened in the EPIC and the corpus callosum medical record system.    Mixed hyperlipidemia 11/9/2012    NAFLD (nonalcoholic fatty liver disease) 10/11/2013    CHASE (nonalcoholic steatohepatitis)     Obesity     Stroke     Type 2 diabetes mellitus with diabetic polyneuropathy, with long-term current use of insulin 5/14/2017    Type 2 diabetes mellitus with hyperglycemia, with long-term current use of insulin 10/11/2013       Past Surgical History:   Procedure Laterality Date    COLONOSCOPY N/A 5/21/2019    Procedure: COLONOSCOPY;  Surgeon: Alex Ascencio MD;  [facility];  Service: Endoscopy;  Laterality: N/A;  confirmed appt-sp     "INSERTION OF TUNNELED CENTRAL VENOUS CATHETER (CVC) WITH SUBCUTANEOUS PORT N/A 12/7/2018    Procedure: [id number];  Surgeon: Luan Diagnostic Provider;  Location: [facility] OR [address] [place] [address];  Service: Radiology;  Laterality: N/A;    SKIN CANCER EXCISION         Review of patient's allergies indicates:  No Known Allergies    No current facility-administered medications on file prior to encounter.     Current Outpatient Medications on File Prior to Encounter   Medication Sig    ascorbic acid, vitamin C, (VITAMIN C) 500 MG tablet Take 500 mg by mouth once daily.    aspirin 81 MG Chew Take 81 mg by mouth once daily.    atorvastatin (LIPITOR) 40 MG tablet Take 1 tablet (40 mg total) by mouth once daily. for 7 days    carvediloL (COREG) 6.25 MG tablet Take 1 tablet (6.25 mg total) by mouth 2 (two) times daily. for 7 days    donepeziL (ARICEPT) 5 MG tablet Take 1 tablet (5 mg total) by mouth every evening. for 7 days    ferrous sulfate (FEOSOL) 325 mg (65 mg iron) Tab tablet Take 325 mg by mouth daily with breakfast.    flash glucose sensor (FREESTYLE ARELY 14 DAY SENSOR) Kit USE   TO CHECK GLUCOSE 4 TIMES DAILY    FREESTYLE ARELY 14 DAY READER Misc GLUCOSE TESTING : FASTING AND PRIOR TO MEALS    insulin glargine (LANTUS) 100 unit/mL injection Inject 10 Units into the skin every evening.    lactobacillus combination no.4 (PROBIOTIC) 3 billion cell Cap Take 1 capsule by mouth once daily.    lancets 30 gauge Misc Test blood sugar four times a day    omega-3 fatty acids-vitamin E (FISH OIL) 1,000 mg Cap Take 1 capsule by mouth 2 (two) times daily.    pantoprazole (PROTONIX) 40 MG tablet Take 1 tablet (40 mg total) by mouth 2 (two) times daily. for 7 days    pen needle, diabetic 32 gauge x 5/32" Ndle USE 1 PEN NEEDLE 4 TIMES DAILY (  SINGLE  USE)    sertraline (ZOLOFT) 100 MG tablet Take 2 tablets (200 mg total) by mouth once daily. for 7 days    vitamin D 1000 units Tab Take 5 tablets (5,000 Units total) by mouth once daily. "    [DISCONTINUED] irbesartan (AVAPRO) 300 MG tablet Take 1 tablet (300 mg total) by mouth every evening. for 7 days    [DISCONTINUED] oxybutynin (DITROPAN) 5 MG Tab Take 10 mg by mouth once daily.    alendronate (FOSAMAX) 70 MG tablet Take 1 tablet (70 mg total) by mouth every 7 days.    [DISCONTINUED] QUEtiapine (SEROQUEL) 25 MG Tab Take 1 tablet (25 mg total) by mouth nightly as needed (sleep). (Patient not taking: Reported on 6/19/2022)     Family History       Problem Relation (Age of Onset)    Cancer Father    Cataracts Mother    Diabetes Maternal Aunt    Heart disease Mother    Heart failure Mother    Hypertension Mother    Rheum arthritis Paternal Grandmother    Stroke Sister          Tobacco Use    Smoking status: Never Smoker    Smokeless tobacco: Never Used   Substance and Sexual Activity    Alcohol use: Yes     Alcohol/week: 0.0 standard drinks     Comment: only on occasion    Drug use: No    Sexual activity: Not Currently     Partners: Female     Review of Systems   Constitutional:  Negative for activity change, chills and fever.   HENT:  Negative for congestion, postnasal drip and sore throat.    Eyes:  Negative for photophobia.   Respiratory:  Negative for chest tightness and shortness of breath.    Cardiovascular:  Negative for chest pain.   Gastrointestinal:  Negative for abdominal distention, abdominal pain, blood in stool and vomiting.   Genitourinary:  Negative for dysuria, flank pain and hematuria.   Musculoskeletal:  Negative for back pain.        Contractures   Skin:  Negative for pallor.   Neurological:  Negative for dizziness, seizures, facial asymmetry, speech difficulty and numbness.        Contractures /atrophy   Limited mobility    Hematological:  Does not bruise/bleed easily.   Psychiatric/Behavioral:  Negative for agitation and suicidal ideas. The patient is not nervous/anxious.    Objective:     Vital Signs (Most Recent):  Temp: 98 °F (36.7 °C) (06/21/22 0715)  Pulse: 67 (06/21/22  0715)  Resp: 18 (06/21/22 0715)  BP: (!) 145/73 (06/21/22 0715)  SpO2: 98 % (06/21/22 0715)   Vital Signs (24h Range):  Temp:  [97.6 °F (36.4 °C)-98 °F (36.7 °C)] 98 °F (36.7 °C)  Pulse:  [61-72] 67  Resp:  [18-19] 18  SpO2:  [96 %-100 %] 98 %  BP: (111-145)/(58-73) 145/73     Weight: 76 kg (167 lb 8.8 oz)  Body mass index is 25.48 kg/m².    Physical Exam  Vitals and nursing note reviewed.   Constitutional:       Appearance: He is well-developed.   HENT:      Head: Normocephalic and atraumatic.      Nose: Nose normal.   Eyes:      Conjunctiva/sclera: Conjunctivae normal.      Pupils: Pupils are equal, round, and reactive to light.   Neck:      Thyroid: No thyromegaly.      Vascular: No JVD.   Cardiovascular:      Rate and Rhythm: Normal rate and regular rhythm.      Heart sounds: Normal heart sounds.   Pulmonary:      Effort: Pulmonary effort is normal.      Breath sounds: Normal breath sounds.   Abdominal:      General: Bowel sounds are normal. There is no distension.      Palpations: Abdomen is soft. There is no mass.      Tenderness: There is no abdominal tenderness. There is no guarding.   Musculoskeletal:         General: Normal range of motion.      Cervical back: Normal range of motion and neck supple.      Comments: contractures   Lymphadenopathy:      Cervical: No cervical adenopathy.   Skin:     General: Skin is warm and dry.      Coloration: Skin is not pale.      Findings: No rash.   Neurological:      Mental Status: He is alert.      Cranial Nerves: No cranial nerve deficit.      Deep Tendon Reflexes: Reflexes are normal and symmetric.      Comments: Contractures ;curled up in bed .  Talks well .wife reports dementia          CRANIAL NERVES     CN III, IV, VI   Pupils are equal, round, and reactive to light.     Significant Labs: A1C:   Recent Labs   Lab 06/19/22  1900   HGBA1C 5.2       CBC:   Recent Labs   Lab 06/19/22 2033   WBC 7.18   HGB 9.2*   HCT 28.0*   *       CMP:   Recent Labs   Lab  06/19/22 2033      K 3.9   *   CO2 19*   *   BUN 42*   CREATININE 1.4   CALCIUM 8.8   PROT 5.9*   ALBUMIN 2.7*   BILITOT 0.5   ALKPHOS 62   AST 25   ALT 17   ANIONGAP 11   EGFRNONAA 53*     Lactate 1.5>2.6    Urine Studies:   Recent Labs   Lab 06/19/22 2222   COLORU Yellow   APPEARANCEUA Cloudy*   PHUR 6.0   SPECGRAV 1.015   PROTEINUA 1+*   GLUCUA Negative   KETONESU Negative   BILIRUBINUA Negative   OCCULTUA 3+*   NITRITE Positive*   UROBILINOGEN 1.0   LEUKOCYTESUR 3+*   RBCUA >100*   WBCUA >100*   BACTERIA Many*   HYALINECASTS 0       Covid negative   Urine culture in process  Blood cultures in process  Flu negative     Significant Imaging:     X ray abd Moderate gaseous distention of the stomach.     Remote rib fractures of ribs 9 and 10 laterally on the left    CXR Borderline cardiomegaly.  No evidence of pulmonary edema.     Lucency in the upper abdomen.  Dedicated x-ray views of the abdomen may be obtained further evaluation    EKG Normal sinus rhythm  Early QRS transition  Minimal voltage criteria for LVH, may be normal variant  Nonspecific ST and/or T wave abnormalities  Abnormal ECG  When compared with ECG of 27-AUG-2021 16:53,  More normal precordial R wave progression  Confirmed by Brandan Melgar MD ([id number]) on 6/20/2022 9:59:15 AM

## 2022-06-21 NOTE — ASSESSMENT & PLAN NOTE
PT/OT/ST> [facility] is still [facility]> will d.c back to [place] and will work on transfer to different facility

## 2022-06-21 NOTE — SUBJECTIVE & OBJECTIVE
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 Love By mri.  Several active lesions, many old. 5/13: MRA brain/neck, MRI brain w/wo contrast show no vascular occlusion, multiple foci of hyperintensity in deep cerebral periventricular white matter in pattern of demyelinating process.  5/17: MRI spine performed, no spinal cord lesions. Hospitalization 6/2017. EEG was performed negative for seizures.  Repeat MRI showing new lesion, question whether this was demyelination versus CVA. 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 Findings compatible with a small acute lacunar infarct adjacent to the left frontal horn.  Nonspecific enhancement just inferior to this region and extending into the left basal ganglia, as well as within the inferior aspect of the left cerebellum.  No evidence of a focal mass. 2.  Extensive increased signal intensity involving the periventricular white matter compatible with demyelinating disease versus vasculitis. 3.  Clinical correlation and followup recommended. 4.  This reportedly flattened in the EPIC and the corpus callosum medical record system.    Mixed hyperlipidemia 11/9/2012    NAFLD (nonalcoholic fatty liver disease) 10/11/2013    CHASE (nonalcoholic steatohepatitis)     Obesity     Stroke     Type 2 diabetes mellitus with diabetic polyneuropathy, with long-term current use of insulin 5/14/2017    Type 2 diabetes mellitus with hyperglycemia, with long-term current use of insulin 10/11/2013       Past Surgical History:   Procedure Laterality Date    COLONOSCOPY N/A 5/21/2019    Procedure: COLONOSCOPY;  Surgeon: Alex Ascencio MD;  Location: [facility];  Service: Endoscopy;  Laterality: N/A;   "confirmed appt-sp    INSERTION OF TUNNELED CENTRAL VENOUS CATHETER (CVC) WITH SUBCUTANEOUS PORT N/A 12/7/2018    Procedure: [id number];  Surgeon: Luan Diagnostic Provider;  Location: [facility] OR [address] [place] [address];  Service: Radiology;  Laterality: N/A;    SKIN CANCER EXCISION         Review of patient's allergies indicates:  No Known Allergies    No current facility-administered medications on file prior to encounter.     Current Outpatient Medications on File Prior to Encounter   Medication Sig    ascorbic acid, vitamin C, (VITAMIN C) 500 MG tablet Take 500 mg by mouth once daily.    aspirin 81 MG Chew Take 81 mg by mouth once daily.    atorvastatin (LIPITOR) 40 MG tablet Take 1 tablet (40 mg total) by mouth once daily. for 7 days    carvediloL (COREG) 6.25 MG tablet Take 1 tablet (6.25 mg total) by mouth 2 (two) times daily. for 7 days    donepeziL (ARICEPT) 5 MG tablet Take 1 tablet (5 mg total) by mouth every evening. for 7 days    ferrous sulfate (FEOSOL) 325 mg (65 mg iron) Tab tablet Take 325 mg by mouth daily with breakfast.    flash glucose sensor (FREESTYLE ARELY 14 DAY SENSOR) Kit USE   TO CHECK GLUCOSE 4 TIMES DAILY    FREESTYLE ARELY 14 DAY READER Misc GLUCOSE TESTING : FASTING AND PRIOR TO MEALS    insulin glargine (LANTUS) 100 unit/mL injection Inject 10 Units into the skin every evening.    irbesartan (AVAPRO) 300 MG tablet Take 1 tablet (300 mg total) by mouth every evening. for 7 days    lactobacillus combination no.4 (PROBIOTIC) 3 billion cell Cap Take 1 capsule by mouth once daily.    lancets 30 gauge Misc Test blood sugar four times a day    omega-3 fatty acids-vitamin E (FISH OIL) 1,000 mg Cap Take 1 capsule by mouth 2 (two) times daily.    oxybutynin (DITROPAN) 5 MG Tab Take 10 mg by mouth once daily.    pantoprazole (PROTONIX) 40 MG tablet Take 1 tablet (40 mg total) by mouth 2 (two) times daily. for 7 days    pen needle, diabetic 32 gauge x 5/32" Ndle USE 1 PEN NEEDLE 4 TIMES " DAILY (  SINGLE  USE)    sertraline (ZOLOFT) 100 MG tablet Take 2 tablets (200 mg total) by mouth once daily. for 7 days    vitamin D 1000 units Tab Take 5 tablets (5,000 Units total) by mouth once daily.    alendronate (FOSAMAX) 70 MG tablet Take 1 tablet (70 mg total) by mouth every 7 days.    QUEtiapine (SEROQUEL) 25 MG Tab Take 1 tablet (25 mg total) by mouth nightly as needed (sleep). (Patient not taking: Reported on 6/19/2022)     Family History       Problem Relation (Age of Onset)    Cancer Father    Cataracts Mother    Diabetes Maternal Aunt    Heart disease Mother    Heart failure Mother    Hypertension Mother    Rheum arthritis Paternal Grandmother    Stroke Sister          Tobacco Use    Smoking status: Never Smoker    Smokeless tobacco: Never Used   Substance and Sexual Activity    Alcohol use: Yes     Alcohol/week: 0.0 standard drinks     Comment: only on occasion    Drug use: No    Sexual activity: Not Currently     Partners: Female     Review of Systems   Constitutional:  Positive for activity change and fever. Negative for chills.   HENT:  Negative for congestion, postnasal drip and sore throat.    Eyes:  Negative for photophobia.   Respiratory:  Negative for chest tightness and shortness of breath.    Cardiovascular:  Negative for chest pain.   Gastrointestinal:  Negative for abdominal distention, abdominal pain, blood in stool and vomiting.        Anorexia   Genitourinary:  Negative for dysuria, flank pain and hematuria.   Musculoskeletal:  Negative for back pain.        Contractures   Skin:  Negative for pallor.   Neurological:  Negative for dizziness, seizures, facial asymmetry, speech difficulty and numbness.        Contractures /atrophy   Limited mobility    Hematological:  Does not bruise/bleed easily.   Psychiatric/Behavioral:  Negative for agitation and suicidal ideas. The patient is not nervous/anxious.    Objective:     Vital Signs (Most Recent):  Temp: 98 °F (36.7 °C) (06/21/22  0715)  Pulse: 67 (06/21/22 0715)  Resp: 18 (06/21/22 0715)  BP: (!) 145/73 (06/21/22 0715)  SpO2: 98 % (06/21/22 0715)   Vital Signs (24h Range):  Temp:  [97.6 °F (36.4 °C)-98 °F (36.7 °C)] 98 °F (36.7 °C)  Pulse:  [61-72] 67  Resp:  [18-19] 18  SpO2:  [96 %-100 %] 98 %  BP: (111-145)/(58-73) 145/73     Weight: 76 kg (167 lb 8.8 oz)  Body mass index is 25.48 kg/m².    Physical Exam  Vitals and nursing note reviewed.   Constitutional:       Appearance: He is well-developed.      Comments: Wife reports losing weight   HENT:      Head: Normocephalic and atraumatic.      Nose: Nose normal.   Eyes:      Conjunctiva/sclera: Conjunctivae normal.      Pupils: Pupils are equal, round, and reactive to light.   Neck:      Thyroid: No thyromegaly.      Vascular: No JVD.   Cardiovascular:      Rate and Rhythm: Normal rate and regular rhythm.      Heart sounds: Normal heart sounds.   Pulmonary:      Effort: Pulmonary effort is normal.      Breath sounds: Normal breath sounds.   Abdominal:      General: Bowel sounds are normal. There is no distension.      Palpations: Abdomen is soft. There is no mass.      Tenderness: There is no abdominal tenderness. There is no guarding.   Musculoskeletal:         General: Normal range of motion.      Cervical back: Normal range of motion and neck supple.      Comments: contractures   Lymphadenopathy:      Cervical: No cervical adenopathy.   Skin:     General: Skin is warm and dry.      Coloration: Skin is not pale.      Findings: No rash.   Neurological:      Mental Status: He is alert.      Cranial Nerves: No cranial nerve deficit.      Deep Tendon Reflexes: Reflexes are normal and symmetric.      Comments: Contractures ;curled up in bed .  Talks well .wife reports dementia          CRANIAL NERVES     CN III, IV, VI   Pupils are equal, round, and reactive to light.     Significant Labs: A1C:   Recent Labs   Lab 06/19/22  1900   HGBA1C 5.2       CBC:   Recent Labs   Lab 06/19/22  2033   WBC  7.18   HGB 9.2*   HCT 28.0*   *       CMP:   Recent Labs   Lab 06/19/22 2033      K 3.9   *   CO2 19*   *   BUN 42*   CREATININE 1.4   CALCIUM 8.8   PROT 5.9*   ALBUMIN 2.7*   BILITOT 0.5   ALKPHOS 62   AST 25   ALT 17   ANIONGAP 11   EGFRNONAA 53*     Lactate 1.5>2.6    Urine Studies:   Recent Labs   Lab 06/19/22 2222   COLORU Yellow   APPEARANCEUA Cloudy*   PHUR 6.0   SPECGRAV 1.015   PROTEINUA 1+*   GLUCUA Negative   KETONESU Negative   BILIRUBINUA Negative   OCCULTUA 3+*   NITRITE Positive*   UROBILINOGEN 1.0   LEUKOCYTESUR 3+*   RBCUA >100*   WBCUA >100*   BACTERIA Many*   HYALINECASTS 0       Covid negative   Urine culture in process  Blood cultures in process  Flu negative     Significant Imaging:     X ray abd Moderate gaseous distention of the stomach.     Remote rib fractures of ribs 9 and 10 laterally on the left    CXR Borderline cardiomegaly.  No evidence of pulmonary edema.     Lucency in the upper abdomen.  Dedicated x-ray views of the abdomen may be obtained further evaluation    EKG Normal sinus rhythm  Early QRS transition  Minimal voltage criteria for LVH, may be normal variant  Nonspecific ST and/or T wave abnormalities  Abnormal ECG  When compared with ECG of 27-AUG-2021 16:53,  More normal precordial R wave progression  Confirmed by Brandan Melgar MD ([id number]) on 6/20/2022 9:59:15 AM

## 2022-06-21 NOTE — ASSESSMENT & PLAN NOTE
Rocephin started in ER 6/19-- cont today is day 3- will send home with 2 more days of kelfex   Follow urine culture.

## 2022-06-21 NOTE — ASSESSMENT & PLAN NOTE
PT/OT/ST> he is still [facility]> will d.c back to [place] and will work on transfer to different facility

## 2022-06-21 NOTE — PLAN OF CARE
Patient swallowed med without difficulty. Wife supportive at bedside. Contractures noted to Left wen/wrist na dBLE. SCD's in use. Blanchable redness to sacrum. Repositioned for support and comfort. PT eval and TX. IVF's. IV Rocephin. Incontinence noted. Unable to verify last known BM with [facility]; wife not sure. Glucometer checks. Insulin prn S/S. Wife in agreement with plan of care.     Problem: Adult Inpatient Plan of Care  Goal: Plan of Care Review  Outcome: Ongoing, Progressing  Goal: Patient-Specific Goal (Individualized)  Outcome: Ongoing, Progressing  Goal: Absence of Hospital-Acquired Illness or Injury  Outcome: Ongoing, Progressing  Goal: Optimal Comfort and Wellbeing  Outcome: Ongoing, Progressing  Goal: Readiness for Transition of Care  Outcome: Ongoing, Progressing     Problem: Diabetes Comorbidity  Goal: Blood Glucose Level Within Targeted Range  Outcome: Ongoing, Progressing     Problem: Fluid and Electrolyte Imbalance (Acute Kidney Injury/Impairment)  Goal: Fluid and Electrolyte Balance  Outcome: Ongoing, Progressing     Problem: Oral Intake Inadequate (Acute Kidney Injury/Impairment)  Goal: Optimal Nutrition Intake  Outcome: Ongoing, Progressing     Problem: Infection  Goal: Absence of Infection Signs and Symptoms  Outcome: Ongoing, Progressing     Problem: Renal Function Impairment (Acute Kidney Injury/Impairment)  Goal: Effective Renal Function  Outcome: Ongoing, Progressing     Problem: Skin Injury Risk Increased  Goal: Skin Health and Integrity  Outcome: Ongoing, Progressing     Problem: Impaired Wound Healing  Goal: Optimal Wound Healing  Outcome: Ongoing, Progressing     Problem: Cognitive Impairment (Functional Deficit)  Goal: Optimal Functional [place]  Outcome: Ongoing, Progressing     Problem: Coordination Impairment (Functional Deficit)  Goal: Optimal Coordination  Outcome: Ongoing, Progressing     Problem: Muscle Strength Impairment (Functional Deficit)  Goal: Improved  Muscle Strength  Outcome: Ongoing, Progressing     Problem: Sensory Impairment (Functional Deficit)  Goal: Compensation for Sensory Deficit  Outcome: Ongoing, Progressing     Problem: Range of Motion Impairment (Functional Deficit)  Goal: Optimal Range of Motion  Outcome: Ongoing, Progressing
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
## 2022-06-21 NOTE — PLAN OF CARE
Recommendation:   1. Diabetic diet appropriate   2. Encourage adequate intake of meals as needed   3. If appropriate consider Boost Glucose with meals    Goals: pt to meet at least 85% EEN by f/u  Nutrition Goal Status: new

## 2022-06-21 NOTE — PLAN OF CARE
06/21/22 1311   GONZALEZ Message   Medicare Outpatient and Observation Notification regarding financial responsibility Given to patient/caregiver;Explained to patient/caregiver;Signed/date by patient/caregiver   Date GONZALEZ was signed 06/20/22   Time GONZALEZ was signed 1200
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
## 2022-06-21 NOTE — PROGRESS NOTES
[place] [facility] Surg ([facility])  [facility] Medicine  Progress Note    Patient Name: Bessy Nunez  MRN: [id number]  Patient Class: OP- Observation   Admission Date: 6/19/2022  Length of Stay: 0 days  Attending Physician: Fabio Rocha MD  Primary Care Provider: Edgar Nova MD        Subjective:     Principal Problem:Acute cystitis with hematuria        HPI:  Bessy Nunez is a 62 y.o. male  patient with hx of DM 2, NAFLD, HLD, CVA, HTN, depression, and CNS vasculitis-MS. HIs wife is at bedside and gives history. She reports that she see him in nursing home twice a day and yesterday he was refusing to eat and was weak. She asked for vitals and he had a fever and low BP. He denies dysuria. Was given 2L NS in ER and started on rocephin for UTI. VSS/101.3 t max . NO eleavted WBC- urine infected culture pending. Also reports that he has become contracted in bed at nursing home. She is not happy with care there. Discussing options for d/c from here with case management, Discussed DNR- [place] post completed . He will be DNR .      Overview/Hospital Course:  Pt has not had another fever. Bp stable. Urine growing ecoli. On rocephin day 3. PT notes max assist. Is participating with OT. Needs assist with ADLs. Pt wife working on finding another nursing home for placement.        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 Love By mri.  Several active lesions, many old. 5/13: MRA brain/neck, MRI brain w/wo contrast show no vascular occlusion, multiple foci of hyperintensity in deep cerebral periventricular white matter in pattern of demyelinating process.  5/17: MRI spine performed, no spinal cord lesions. Hospitalization 6/2017. EEG was performed negative for seizures.  Repeat MRI showing new lesion, question whether this was demyelination versus CVA. 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 Findings compatible with a small acute lacunar infarct adjacent to the left frontal horn.  Nonspecific enhancement just inferior to this region and extending into the left basal ganglia, as well as within the inferior aspect of the left cerebellum.  No evidence of a focal mass. 2.  Extensive increased signal intensity involving the periventricular white matter compatible with demyelinating disease versus vasculitis. 3.  Clinical correlation and followup recommended. 4.  This reportedly flattened in the EPIC and the corpus callosum medical record system.    Mixed hyperlipidemia 11/9/2012    NAFLD (nonalcoholic fatty liver disease) 10/11/2013    CHASE (nonalcoholic steatohepatitis)     Obesity     Stroke     Type 2 diabetes mellitus with diabetic polyneuropathy, with long-term current use of insulin 5/14/2017    Type 2 diabetes mellitus with hyperglycemia, with long-term current use of insulin 10/11/2013       Past Surgical History:   Procedure Laterality Date    COLONOSCOPY N/A 5/21/2019    Procedure: COLONOSCOPY;  Surgeon: Alex Ascencio MD;  Location[facility];  Service: Endoscopy;  Laterality: N/A;  confirmed appt-sp    INSERTION OF TUNNELED CENTRAL VENOUS CATHETER (CVC) WITH SUBCUTANEOUS PORT N/A 12/7/2018    Procedure: [id number];  Surgeon: Luan Diagnostic Provider;  Location: [facility] OR [address] [place] [address];  Service: Radiology;  Laterality: N/A;    SKIN CANCER EXCISION         Review of patient's allergies indicates:  No Known Allergies    No current facility-administered medications on file prior to encounter.     Current Outpatient Medications on File Prior to Encounter   Medication Sig    ascorbic acid, vitamin C, (VITAMIN C) 500 MG tablet Take 500 mg by mouth once daily.    aspirin 81 MG Chew Take 81 mg by mouth once daily.    atorvastatin  "(LIPITOR) 40 MG tablet Take 1 tablet (40 mg total) by mouth once daily. for 7 days    carvediloL (COREG) 6.25 MG tablet Take 1 tablet (6.25 mg total) by mouth 2 (two) times daily. for 7 days    donepeziL (ARICEPT) 5 MG tablet Take 1 tablet (5 mg total) by mouth every evening. for 7 days    ferrous sulfate (FEOSOL) 325 mg (65 mg iron) Tab tablet Take 325 mg by mouth daily with breakfast.    flash glucose sensor (FREESTYLE ARELY 14 DAY SENSOR) Kit USE   TO CHECK GLUCOSE 4 TIMES DAILY    FREESTYLE ARELY 14 DAY READER Misc GLUCOSE TESTING : FASTING AND PRIOR TO MEALS    insulin glargine (LANTUS) 100 unit/mL injection Inject 10 Units into the skin every evening.    lactobacillus combination no.4 (PROBIOTIC) 3 billion cell Cap Take 1 capsule by mouth once daily.    lancets 30 gauge Misc Test blood sugar four times a day    omega-3 fatty acids-vitamin E (FISH OIL) 1,000 mg Cap Take 1 capsule by mouth 2 (two) times daily.    pantoprazole (PROTONIX) 40 MG tablet Take 1 tablet (40 mg total) by mouth 2 (two) times daily. for 7 days    pen needle, diabetic 32 gauge x 5/32" Ndle USE 1 PEN NEEDLE 4 TIMES DAILY (  SINGLE  USE)    sertraline (ZOLOFT) 100 MG tablet Take 2 tablets (200 mg total) by mouth once daily. for 7 days    vitamin D 1000 units Tab Take 5 tablets (5,000 Units total) by mouth once daily.    [DISCONTINUED] irbesartan (AVAPRO) 300 MG tablet Take 1 tablet (300 mg total) by mouth every evening. for 7 days    [DISCONTINUED] oxybutynin (DITROPAN) 5 MG Tab Take 10 mg by mouth once daily.    alendronate (FOSAMAX) 70 MG tablet Take 1 tablet (70 mg total) by mouth every 7 days.    [DISCONTINUED] QUEtiapine (SEROQUEL) 25 MG Tab Take 1 tablet (25 mg total) by mouth nightly as needed (sleep). (Patient not taking: Reported on 6/19/2022)     Family History       Problem Relation (Age of Onset)    Cancer Father    Cataracts Mother    Diabetes Maternal Aunt    Heart disease Mother    Heart failure Mother    " Hypertension Mother    Rheum arthritis Paternal Grandmother    Stroke Sister          Tobacco Use    Smoking status: Never Smoker    Smokeless tobacco: Never Used   Substance and Sexual Activity    Alcohol use: Yes     Alcohol/week: 0.0 standard drinks     Comment: only on occasion    Drug use: No    Sexual activity: Not Currently     Partners: Female     Review of Systems   Constitutional:  Negative for activity change, chills and fever.   HENT:  Negative for congestion, postnasal drip and sore throat.    Eyes:  Negative for photophobia.   Respiratory:  Negative for chest tightness and shortness of breath.    Cardiovascular:  Negative for chest pain.   Gastrointestinal:  Negative for abdominal distention, abdominal pain, blood in stool and vomiting.   Genitourinary:  Negative for dysuria, flank pain and hematuria.   Musculoskeletal:  Negative for back pain.        Contractures   Skin:  Negative for pallor.   Neurological:  Negative for dizziness, seizures, facial asymmetry, speech difficulty and numbness.        Contractures /atrophy   Limited mobility    Hematological:  Does not bruise/bleed easily.   Psychiatric/Behavioral:  Negative for agitation and suicidal ideas. The patient is not nervous/anxious.    Objective:     Vital Signs (Most Recent):  Temp: 98 °F (36.7 °C) (06/21/22 0715)  Pulse: 67 (06/21/22 0715)  Resp: 18 (06/21/22 0715)  BP: (!) 145/73 (06/21/22 0715)  SpO2: 98 % (06/21/22 0715)   Vital Signs (24h Range):  Temp:  [97.6 °F (36.4 °C)-98 °F (36.7 °C)] 98 °F (36.7 °C)  Pulse:  [61-72] 67  Resp:  [18-19] 18  SpO2:  [96 %-100 %] 98 %  BP: (111-145)/(58-73) 145/73     Weight: 76 kg (167 lb 8.8 oz)  Body mass index is 25.48 kg/m².    Physical Exam  Vitals and nursing note reviewed.   Constitutional:       Appearance: He is well-developed.   HENT:      Head: Normocephalic and atraumatic.      Nose: Nose normal.   Eyes:      Conjunctiva/sclera: Conjunctivae normal.      Pupils: Pupils are equal,  round, and reactive to light.   Neck:      Thyroid: No thyromegaly.      Vascular: No JVD.   Cardiovascular:      Rate and Rhythm: Normal rate and regular rhythm.      Heart sounds: Normal heart sounds.   Pulmonary:      Effort: Pulmonary effort is normal.      Breath sounds: Normal breath sounds.   Abdominal:      General: Bowel sounds are normal. There is no distension.      Palpations: Abdomen is soft. There is no mass.      Tenderness: There is no abdominal tenderness. There is no guarding.   Musculoskeletal:         General: Normal range of motion.      Cervical back: Normal range of motion and neck supple.      Comments: contractures   Lymphadenopathy:      Cervical: No cervical adenopathy.   Skin:     General: Skin is warm and dry.      Coloration: Skin is not pale.      Findings: No rash.   Neurological:      Mental Status: He is alert.      Cranial Nerves: No cranial nerve deficit.      Deep Tendon Reflexes: Reflexes are normal and symmetric.      Comments: Contractures ;curled up in bed .  Talks well .wife reports dementia          CRANIAL NERVES     CN III, IV, VI   Pupils are equal, round, and reactive to light.     Significant Labs: A1C:   Recent Labs   Lab 06/19/22  1900   HGBA1C 5.2       CBC:   Recent Labs   Lab 06/19/22 2033   WBC 7.18   HGB 9.2*   HCT 28.0*   *       CMP:   Recent Labs   Lab 06/19/22 2033      K 3.9   *   CO2 19*   *   BUN 42*   CREATININE 1.4   CALCIUM 8.8   PROT 5.9*   ALBUMIN 2.7*   BILITOT 0.5   ALKPHOS 62   AST 25   ALT 17   ANIONGAP 11   EGFRNONAA 53*     Lactate 1.5>2.6    Urine Studies:   Recent Labs   Lab 06/19/22 2222   COLORU Yellow   APPEARANCEUA Cloudy*   PHUR 6.0   SPECGRAV 1.015   PROTEINUA 1+*   GLUCUA Negative   KETONESU Negative   BILIRUBINUA Negative   OCCULTUA 3+*   NITRITE Positive*   UROBILINOGEN 1.0   LEUKOCYTESUR 3+*   RBCUA >100*   WBCUA >100*   BACTERIA Many*   HYALINECASTS 0       Covid negative   Urine culture in  process  Blood cultures in process  Flu negative     Significant Imaging:     X ray abd Moderate gaseous distention of the stomach.     Remote rib fractures of ribs 9 and 10 laterally on the left    CXR Borderline cardiomegaly.  No evidence of pulmonary edema.     Lucency in the upper abdomen.  Dedicated x-ray views of the abdomen may be obtained further evaluation    EKG Normal sinus rhythm  Early QRS transition  Minimal voltage criteria for LVH, may be normal variant  Nonspecific ST and/or T wave abnormalities  Abnormal ECG  When compared with ECG of 27-AUG-2021 16:53,  More normal precordial R wave progression  Confirmed by Brandan Melgar MD ([id number]) on 6/20/2022 9:59:15 AM      Assessment/Plan:      * Acute cystitis with hematuria  Rocephin started in ER 6/19-- cont today is day 3- will send home with 2 more days of kelfex   Follow urine culture.      Hemiplegia as late effect of cerebrovascular accident (CVA)  Cont statin and asa.      Debility    PT/OT/ST> he is still max assist needs nursing home> will d.c back to [facility] and will work on transfer to different facility     Dementia with behavioral disturbance  Resume aricept .      Advance care planning  We discussed his lapost ; I agree with DNR   Wife and patient discussed his advance directive   DNR       Controlled type 2 diabetes mellitus with diabetic nephropathy, with long-term current use of insulin; metformin intolerable diarrhea  Hold lantus 10 units daily and give SSI low dose for now- reduced appetite reported with recent weight loss.      Essential hypertension 03/09/2020 TTE concentric LV remodeling.  Normal LV systolic function LVEF 55%.  Grade 2 diastolic dysfunction.  Normal CVP.  PA pressure 20.  Cont coreg for now.   Hold ARB.      VTE Risk Mitigation (From admission, onward)         Ordered     enoxaparin injection 40 mg  Daily         06/20/22 1006     IP VTE LOW RISK PATIENT  Once         06/20/22 0033     Place sequential compression device   Until discontinued         06/20/22 0033                Discharge Planning   BELIA: 6/21/2022     Code Status: DNR   Is the patient medically ready for discharge?:     Reason for patient still in hospital (select all that apply): Pending disposition  Discharge Plan A: Home with family, Home Health                  Ivett Orr MD  Department of Hospital Medicine   [place] - [facility] Surg (3rd Fl)

## 2022-06-21 NOTE — PLAN OF CARE
Problem: Adult Inpatient Plan of Care  Goal: Absence of Hospital-Acquired Illness or Injury  Outcome: Ongoing, Progressing     Problem: Adult Inpatient Plan of Care  Goal: Optimal Comfort and Wellbeing  Outcome: Ongoing, Progressing     Problem: Diabetes Comorbidity  Goal: Blood Glucose Level Within Targeted Range  Outcome: Ongoing, Progressing     Problem: Skin Injury Risk Increased  Goal: Skin Health and Integrity  Outcome: Ongoing, Progressing     Problem: Coordination Impairment (Functional Deficit)  Goal: Optimal Coordination  Outcome: Ongoing, Progressing

## 2022-06-21 NOTE — CONSULTS
Patient returning to [facility] for long-term care as he resided there before this admission. Family unhappy with the care and would like to transition patient to another facility. MD discussed making the transition as an outpatient and that he would need to discharge back to [facility] at this time as he is medically stable for discharge. Spouse agreeable.

## 2022-06-21 NOTE — PLAN OF CARE
Report given to [facility] nurse by Lenora ORTIZ.   [facility] Sent discharge instructions and medications with patient for transport to the [facility].

## 2022-06-21 NOTE — HOSPITAL COURSE
Pt has not had another fever. Bp stable. Urine growing ecoli. On rocephin day 3. PT notes max assist. Is participating with OT. Needs assist with ADLs. Pt wife working on finding another nursing home for placement.

## 2022-06-22 LAB — BACTERIA UR CULT: ABNORMAL

## 2022-06-22 NOTE — PT/OT/SLP DISCHARGE
Physical Therapy Discharge Summary    Name: Bessy Nunez  MRN: [id number]   Principal Problem: Acute cystitis with hematuria     Patient Discharged from acute Physical Therapy on 22.  Please refer to prior PT noted date on 22 for functional status.     Assessment:     Patient was discharged unexpectedly.  Information required to complete an accurate discharge summary is unknown.  Refer to therapy initial evaluation and last progress note for initial and most recent functional status and goal achievement.  Recommendations made may be found in medical record.    Objective:     GOALS:   Multidisciplinary Problems     Physical Therapy Goals        Problem: Physical Therapy    Goal Priority Disciplines Outcome Goal Variances Interventions   Physical Therapy Goal     PT, PT/OT      Description:   Patient will increase functional independence with mobility by performin. Increase ROM bilateral Knees extension by 5- 10 degrees to prevent further joint contractures and ease body repositioning.  2. Increase bed mobility rolling to sides to minimal assistance  3. Supine <>sit to  moderate/minimal  assistance  and cues  4. Bed to chair transfer with maximal/moderate assistance.with slide/squat TECHNIQUE  5. Lower extremity exercise program x10 reps per handout, with assistance as needed                      Reasons for Discontinuation of Therapy Services  Transfer to alternate level of care.      Plan:     Patient Discharged to: Skilled Nursing Facility at [facility].      2022

## 2022-06-22 NOTE — PLAN OF CARE
[place] - Med Surg (3rd Fl)  Discharge Final Note    Primary Care Provider: Edgar Nova MD    Expected Discharge Date: 6/21/2022    Final Discharge Note (most recent)     Final Note - 06/22/22 1200        Final Note    Assessment Type Final Discharge Note     Anticipated Discharge Disposition Home or Self Care     What phone number can be called within the next 1-3 days to see how you are doing after discharge? [phone]        Post-Acute Status    Post-Acute Authorization Other     Other Status No Post-Acute Service Needs     Discharge Delays None known at this time                 Important Message from Medicare             Contact Info     [facility]    [address] [place] [id number]   Phone: [phone]       Next Steps: Follow up          Patient discharging back to [facility] where he resided prior to this admission.

## 2022-06-24 NOTE — PROGRESS NOTES
Please call [facility] and make them aware of his urine culture. It is resistant to the antibiotic that he was discharged with.   MB

## 2022-06-25 LAB
BACTERIA BLD CULT: NORMAL
BACTERIA BLD CULT: NORMAL

## 2022-06-30 ENCOUNTER — TELEPHONE (OUTPATIENT)
Dept: INTERNAL MEDICINE | Facility: CLINIC | Age: 62
End: 2022-06-30
Payer: MEDICARE

## 2022-06-30 NOTE — TELEPHONE ENCOUNTER
Notified annie there is no need for a sooner appointment as this is just an annual wellness. If pt having issues he needs to contact his PCP

## 2022-06-30 NOTE — TELEPHONE ENCOUNTER
----- Message from Jaylene Pate sent at 2022 10:44 AM CDT -----  Contact: Marky Nunez  MRN: [id number]  : 1960  PCP: Edgar Nova  Home Phone      [phone]  Work Phone      Not on file.  Mobile          [phone]  Home Phone      [phone]  Mobile          [phone]      MESSAGE: Called to RS MR Doherty's appt for  and the earliest I can reschedule him is  at 10:45. His son Marky is asking if we have anything earlier.    Marky [phone]

## 2022-07-06 PROBLEM — Z23 NEED FOR TDAP VACCINATION: Status: RESOLVED | Noted: 2018-08-29 | Resolved: 2022-07-06

## 2022-07-06 PROBLEM — Z23 NEED FOR PNEUMOCOCCAL VACCINATION: Status: RESOLVED | Noted: 2018-08-29 | Resolved: 2022-07-06

## 2022-07-06 PROBLEM — Z23 NEED FOR HEPATITIS A AND B VACCINATION: Status: RESOLVED | Noted: 2018-08-29 | Resolved: 2022-07-06

## 2022-07-06 PROBLEM — Z87.81 HISTORY OF HUMERUS FRACTURE: Status: ACTIVE | Noted: 2021-08-22

## 2022-07-11 ENCOUNTER — TELEPHONE (OUTPATIENT)
Dept: FAMILY MEDICINE | Facility: CLINIC | Age: 62
End: 2022-07-11
Payer: MEDICARE

## 2022-07-11 PROBLEM — Z75.8 DISCHARGE PLANNING ISSUES: Status: RESOLVED | Noted: 2021-08-27 | Resolved: 2022-07-11

## 2022-07-11 PROBLEM — S22.32XD CLOSED FRACTURE OF ONE RIB OF LEFT SIDE WITH ROUTINE HEALING: Status: RESOLVED | Noted: 2020-01-03 | Resolved: 2022-07-11

## 2022-07-11 PROBLEM — T14.8XXA ABRASION: Status: RESOLVED | Noted: 2020-01-03 | Resolved: 2022-07-11
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
## 2022-07-12 ENCOUNTER — OFFICE VISIT (OUTPATIENT)
Dept: FAMILY MEDICINE | Facility: CLINIC | Age: 62
End: 2022-07-12
Payer: MEDICARE

## 2022-07-12 DIAGNOSIS — D84.9 ACQUIRED IMMUNOCOMPROMISED STATE: ICD-10-CM

## 2022-07-12 DIAGNOSIS — I77.6 CNS VASCULITIS: ICD-10-CM

## 2022-07-12 DIAGNOSIS — F03.91 DEMENTIA WITH BEHAVIORAL DISTURBANCE, UNSPECIFIED DEMENTIA TYPE: ICD-10-CM

## 2022-07-12 DIAGNOSIS — Z86.73 HISTORY OF STROKE: ICD-10-CM

## 2022-07-12 DIAGNOSIS — Z79.4 TYPE 2 DIABETES MELLITUS WITH DIABETIC POLYNEUROPATHY, WITH LONG-TERM CURRENT USE OF INSULIN: ICD-10-CM

## 2022-07-12 DIAGNOSIS — E11.42 TYPE 2 DIABETES MELLITUS WITH DIABETIC POLYNEUROPATHY, WITH LONG-TERM CURRENT USE OF INSULIN: ICD-10-CM

## 2022-07-12 DIAGNOSIS — L89.159 KENNEDY TERMINAL ULCER OF SACRUM: Primary | ICD-10-CM

## 2022-07-12 DIAGNOSIS — R41.89 COGNITIVE IMPAIRMENT: ICD-10-CM

## 2022-07-12 PROCEDURE — 99214 OFFICE O/P EST MOD 30 MIN: CPT | Mod: 95,,, | Performed by: INTERNAL MEDICINE

## 2022-07-12 PROCEDURE — 1159F PR MEDICATION LIST DOCUMENTED IN MEDICAL RECORD: ICD-10-PCS | Mod: CPTII,95,, | Performed by: INTERNAL MEDICINE

## 2022-07-12 PROCEDURE — 3044F HG A1C LEVEL LT 7.0%: CPT | Mod: CPTII,95,, | Performed by: INTERNAL MEDICINE

## 2022-07-12 PROCEDURE — 4010F ACE/ARB THERAPY RXD/TAKEN: CPT | Mod: CPTII,95,, | Performed by: INTERNAL MEDICINE

## 2022-07-12 PROCEDURE — 1160F RVW MEDS BY RX/DR IN RCRD: CPT | Mod: CPTII,95,, | Performed by: INTERNAL MEDICINE

## 2022-07-12 PROCEDURE — 1159F MED LIST DOCD IN RCRD: CPT | Mod: CPTII,95,, | Performed by: INTERNAL MEDICINE

## 2022-07-12 PROCEDURE — 99499 RISK ADDL DX/OHS AUDIT: ICD-10-PCS | Mod: 95,,, | Performed by: INTERNAL MEDICINE

## 2022-07-12 PROCEDURE — 4010F PR ACE/ARB THEARPY RXD/TAKEN: ICD-10-PCS | Mod: CPTII,95,, | Performed by: INTERNAL MEDICINE

## 2022-07-12 PROCEDURE — 99499 UNLISTED E&M SERVICE: CPT | Mod: 95,,, | Performed by: INTERNAL MEDICINE

## 2022-07-12 PROCEDURE — 3044F PR MOST RECENT HEMOGLOBIN A1C LEVEL <7.0%: ICD-10-PCS | Mod: CPTII,95,, | Performed by: INTERNAL MEDICINE

## 2022-07-12 PROCEDURE — 1160F PR REVIEW ALL MEDS BY PRESCRIBER/CLIN PHARMACIST DOCUMENTED: ICD-10-PCS | Mod: CPTII,95,, | Performed by: INTERNAL MEDICINE

## 2022-07-12 PROCEDURE — 99214 PR OFFICE/OUTPT VISIT, EST, LEVL IV, 30-39 MIN: ICD-10-PCS | Mod: 95,,, | Performed by: INTERNAL MEDICINE

## 2022-08-11 ENCOUNTER — PES CALL (OUTPATIENT)
Dept: ADMINISTRATIVE | Facility: OTHER | Age: 62
End: 2022-08-11
Payer: MEDICARE

## 2022-09-08 NOTE — PROGRESS NOTES
SECTION OF HEMATOLOGY AND BONE MARROW TRANSPLANT    06/21/2019  Referred by:  Dr. Love  Referred for: Cytoxan    HISTORY OF PRESENT ILLNESS:   Presents today to clinic with wife for another cycle of cytoxan chemotherpay for CNS Vasculitis. This would be dose (#23).  Neurologic symptoms worsened when Cytoxan was stopped in 2018. Neurological symptoms have improved overall since restarting Cytoxan.  Plan to continue for now per neurology, he was seen for follow-up 4/22/2019.  He has largely tolerated therapy with no major complications other than brief periods of neutropenia. Today he is feeling well without any complaints. Denies fevers, chills, night sweats, nausea, vomiting, constipation, neuropathy, chest pain, SOB.  Denies HA, or vision changes. Reports neurologic status improved. Denies any falls at home recently (he was falling when cytoxan was stopped). He has occasional diarrhea with cytoxan. He takes imodium OTC PRN which helps.  Recently in ED for dizziness 6/6/19 due to dehydration. Cr up to 1.5 on 6/6/19. Given IVF in ED and discharged home. He does report dizziness when getting up out of bed and minimal oral water intake. His Cr is 1.5 today in clinic.      PAST MEDICAL HISTORY:   Past Medical History:   Diagnosis Date    Amblyopia     Cataract     CNS vasculitis 6/2/2017    Follows with Dr. Love By mri.  Several active lesions, many old. 5/13: MRA brain/neck, MRI brain w/wo contrast show no vascular occlusion, multiple foci of hyperintensity in deep cerebral periventricular white matter in pattern of demyelinating process.  5/17: MRI spine performed, no spinal cord lesions. Hospitalization 6/2017. EEG was performed negative for seizures.  Repeat MRI showing new lesion, question whether this was demyelination versus CVA. 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 Findings compatible with a small acute lacunar infarct adjacent to the left frontal horn.  Nonspecific enhancement just inferior to this region and extending into the left basal ganglia, as well as within the inferior aspect of the left cerebellum.  No evidence of a focal mass. 2.  Extensive increased signal intensity involving the periventricular white matter compatible with demyelinating disease versus vasculitis. 3.  Clinical correlation and followup recommended. 4.  This reportedly flattened in the EPIC and the corpus callosum medical record system.    Mixed hyperlipidemia 11/9/2012    NAFLD (nonalcoholic fatty liver disease) 10/11/2013    CHASE (nonalcoholic steatohepatitis)     Obesity     Stroke     Type 2 diabetes mellitus with diabetic polyneuropathy, with long-term current use of insulin 5/14/2017    Type 2 diabetes mellitus with hyperglycemia, with long-term current use of insulin 10/11/2013       PAST SURGICAL HISTORY:   Past Surgical History:   Procedure Laterality Date    BIOPSY LIVER AND ULTRASOUND N/A 6/9/2015    Performed by [facility] Provider at [facility] OR 2ND FLR    COLONOSCOPY N/A 5/21/2019    Performed by [facility] Ascencio [facility] at [facility] ENDO    COLONOSCOPY N/A 2/21/2014    Performed by Mario Aceves MD at [facility] ENDO    ESOPHAGOGASTRODUODENOSCOPY (EGD) N/A 5/29/2017    Performed by Hai Carter MD at [facility] ENDO (2ND FLR)    [id number]-PYEB-I-CATH N/A 12/7/2018    Performed by [facility] Provider at [facility] OR 2ND FLR    SKIN CANCER EXCISION         PAST SOCIAL HISTORY:   reports that he has never smoked. He has never used smokeless tobacco. He reports that he does not drink alcohol or use drugs.    FAMILY HISTORY:  Family History   Problem Relation Age of Onset    Heart disease Mother     Hypertension Mother     Heart failure Mother     Cataracts Mother     Cancer Father         lung    Diabetes  Maternal Aunt     Stroke Sister     Rheum arthritis Paternal Grandmother     Amblyopia Neg Hx     Blindness Neg Hx     Glaucoma Neg Hx     Macular degeneration Neg Hx     Retinal detachment Neg Hx     Strabismus Neg Hx        CURRENT MEDICATIONS:   Current Outpatient Medications   Medication Sig    alclomethasone (ACLOVATE) 0.05 % cream     alendronate (FOSAMAX) 70 MG tablet Take 1 tablet (70 mg total) by mouth every 7 days.    aspirin (ECOTRIN) 81 MG EC tablet Take 81 mg by mouth once daily.    atenolol (TENORMIN) 50 MG tablet Take 1 tablet (50 mg total) by mouth once daily.    atorvastatin (LIPITOR) 40 MG tablet Take 1 tablet (40 mg total) by mouth once daily.    blood sugar diagnostic (TRUE METRIX GLUCOSE TEST STRIP) Strp Test blood sugar four times a day    diltiaZEM (DILACOR XR) 240 MG CDCR Take 1 capsule (240 mg total) by mouth once daily.    flash glucose sensor (FREESTYLE ARELY 14 DAY SENSOR) Kit Glucose checking 4 times a day    FREESTYLE ARELY 14 DAY READER Misc GLUCOSE TESTING : FASTING AND PRIOR TO MEALS    insulin (BASAGLAR KWIKPEN U-100 INSULIN) glargine 100 units/mL (3mL) SubQ pen Inject 40 Units into the skin 2 (two) times daily. Up to 60 BID with cytoxan    insulin aspart U-100 (NOVOLOG) 100 unit/mL InPn pen Inject 20 Units into the skin 3 (three) times daily with meals. (Patient taking differently: Inject 20 Units into the skin 4 (four) times daily. )    insulin syringe-needle U-100 (INSULIN SYRINGE) 1/2 mL 30 gauge x 5/16 Syrg Use 3x/day    irbesartan (AVAPRO) 300 MG tablet Take 1 tablet (300 mg total) by mouth every evening.    ketoconazole (NIZORAL) 2 % cream     lancets 30 gauge Misc Test blood sugar four times a day    liraglutide 0.6 mg/0.1 mL, 18 mg/3 mL, subq PNIJ (VICTOZA 3-TOMASZ) 0.6 mg/0.1 mL (18 mg/3 mL) PnIj Inject 1.8 mg into the skin once daily.    metFORMIN (GLUCOPHAGE-XR) 500 MG 24 hr tablet Take 2 tablets (1,000 mg total) by mouth 2 (two) times daily with  "meals.    methylphenidate HCl (RITALIN) 5 MG tablet Take 1 tablet (5 mg total) by mouth once daily.    omega-3 fatty acids-vitamin E (FISH OIL) 1,000 mg Cap Take 1 capsule by mouth 2 (two) times daily.    pen needle, diabetic (BD ULTRA-FINE ANA PEN NEEDLE) 32 gauge x 5/32" Ndle 4x daily insulin pen needle, relion    QUEtiapine (SEROQUEL) 25 MG Tab Take 1 tablet (25 mg total) by mouth every evening.    sertraline (ZOLOFT) 100 MG tablet Take 1 tab and half (total 150mg) daily    VICTOZA 2-TOMASZ 0.6 mg/0.1 mL (18 mg/3 mL) PnIj INJECT 1.8 MG INTO THE SKIN ONCE DAILY    vitamin D 1000 units Tab Take 5 tablets (5,000 Units total) by mouth once daily.     No current facility-administered medications for this visit.      Facility-Administered Medications Ordered in Other Visits   Medication    alteplase injection 2 mg    cyclophosphamide (CYTOXAN) 1,500 mg in sodium chloride 0.9% 250 mL chemo infusion    heparin, porcine (PF) 100 unit/mL injection flush 500 Units    sodium chloride 0.9% 1,000 mL infusion    sodium chloride 0.9% flush 10 mL     ALLERGIES:   Review of patient's allergies indicates:  No Known Allergies    REVIEW OF SYSTEMS:   General ROS: Negative  Psychological ROS: Confusion much improved.   Ophthalmic ROS: negative  ENT ROS: negative  Allergy and Immunology ROS: negative  Hematological and Lymphatic ROS: negative  Endocrine ROS: negative  Respiratory ROS: negative  Cardiovascular ROS: negative  Gastrointestinal ROS: negative  Genito-Urinary ROS: negative  Musculoskeletal ROS: negative  Neurological ROS: see HPI  Dermatological ROS: negative    PHYSICAL EXAM:   Vitals:    06/21/19 1328   BP: (!) 145/80   Pulse: 73   Temp: 98.3 °F (36.8 °C)       General - well developed, well nourished; much more interactive today. A+O x3  Head & Face - no sinus tenderness  Eyes - normal conjunctivae and lids   ENT - normal external auditory canals, no mouth sores  Chest and Lung - normal respiratory effort, clear " to auscultation bilaterally   Cardiovascular - RRR with no MGR, normal S1 and S2; no pedal edema  Abdomen -  soft, nontender, no palpable hepatomegaly or splenomegaly  Lymph - no palpable lymphadenopathy  Extremities - unremarkable nails and digits  Heme - no bruising, petechiae, pallor  Skin - no rashes or lesions   Psych - Normal mood and affect. No confusion noted.    ECOG Performance Status: (foot note - ECOG PS provided by Eastern Cooperative Oncology Group) 1 - Symptomatic but completely ambulatory    Karnofsky Performance Score:  80%- Normal Activity with Effort: Some Symptoms of Disease  DATA:   Lab Results   Component Value Date    WBC 6.26 06/21/2019    HGB 10.5 (L) 06/21/2019    HCT 31.2 (L) 06/21/2019    MCV 85 06/21/2019     (L) 06/21/2019     Gran # (ANC)   Date Value Ref Range Status   06/21/2019 4.8 1.8 - 7.7 K/uL Final     Gran%   Date Value Ref Range Status   06/21/2019 76.7 (H) 38.0 - 73.0 % Final     CMP  Sodium   Date Value Ref Range Status   06/21/2019 141 136 - 145 mmol/L Final     Potassium   Date Value Ref Range Status   06/21/2019 3.6 3.5 - 5.1 mmol/L Final     Chloride   Date Value Ref Range Status   06/21/2019 107 95 - 110 mmol/L Final     CO2   Date Value Ref Range Status   06/21/2019 23 23 - 29 mmol/L Final     Glucose   Date Value Ref Range Status   06/21/2019 209 (H) 70 - 110 mg/dL Final     BUN, Bld   Date Value Ref Range Status   06/21/2019 21 8 - 23 mg/dL Final     Creatinine   Date Value Ref Range Status   06/21/2019 1.5 (H) 0.5 - 1.4 mg/dL Final     Calcium   Date Value Ref Range Status   06/21/2019 9.6 8.7 - 10.5 mg/dL Final     Total Protein   Date Value Ref Range Status   06/21/2019 6.8 6.0 - 8.4 g/dL Final     Albumin   Date Value Ref Range Status   06/21/2019 3.7 3.5 - 5.2 g/dL Final   10/11/2013 4.3 3.6 - 5.1 g/dL Final     Comment:     @ Test Performed By:  TRIBAX Verajayson Estrada M.D., FCAP.,   [address] [place] [address]  [facility] #[id number]     Total Bilirubin   Date Value Ref Range Status   06/21/2019 0.7 0.1 - 1.0 mg/dL Final     Comment:     For infants and newborns, interpretation of results should be based  on gestational age, weight and in agreement with clinical  observations.  Premature Infant recommended reference ranges:  Up to 24 hours.............<8.0 mg/dL  Up to 48 hours............<12.0 mg/dL  3-5 days..................<15.0 mg/dL  6-29 days.................<15.0 mg/dL       Alkaline Phosphatase   Date Value Ref Range Status   06/21/2019 68 55 - 135 U/L Final     AST   Date Value Ref Range Status   06/21/2019 17 10 - 40 U/L Final     ALT   Date Value Ref Range Status   06/21/2019 15 10 - 44 U/L Final     Anion Gap   Date Value Ref Range Status   06/21/2019 11 8 - 16 mmol/L Final     eGFR if    Date Value Ref Range Status   06/21/2019 57.3 (A) >60 mL/min/1.73 m^2 Final     eGFR if non    Date Value Ref Range Status   06/21/2019 49.5 (A) >60 mL/min/1.73 m^2 Final     Comment:     Calculation used to obtain the estimated glomerular filtration  rate (eGFR) is the CKD-EPI equation.          ASSESSMENT AND PLAN:   Encounter Diagnoses   Name Primary?    CNS vasculitis Yes    Anemia associated with chemotherapy     Chemotherapy-induced thrombocytopenia     MANISH (acute kidney injury)     Chemotherapy induced diarrhea     Essential hypertension     Type 2 diabetes mellitus with diabetic polyneuropathy, with long-term current use of insulin     Mixed hyperlipidemia        CNS vasculitis  -continue CTX per neuro recs  -proceed with dose #23 Cytoxan was decreased to 600 mg/m2 (due to CrCl) with mesna support for CNS vasculitis, protocol per Dr. Love; improved symptoms since initiation   -will continue per Neurology, failed Cytoxan hiatus and transition to Imuran Sept/Oct 2018. Restarted cytoxan 1/23/2018. Now much improved back on Cytoxan.  - last saw Neurology  4/22/19   -No contraindication to proceed with CTX today    Cytopenias- Anemia/thrombocytopenia  -mild anemia and thromboctyopenia 2/2 chemotherapy, WBC wnl  -Transfuse for Hgb<7 and Plt <10k, no indication to transfuse today    Diarrhea  - imodium OTC PRN    HTN/HLD/DM2  - f/u with PCP  - continue metformin, liraglutide, novolog, irbesartan, diazepam, lipitor, atenolol, ASA  - BG elevated today; pt just ate lunch prior to lab draw    MANISH  - 1L NS upstairs today   - repeat labs in 1 week  - consider IVF with HH if patient does not improve his PO water intake   - secondary to poor oral intake     F/U:  - CBC and CMP in 1 week at [facility]) (lab collect).  - cbc, cmp, lab only in 2 weeks at [facility]) (lab collect).  - CBC, CMP, NP appt or Dr Goldstein, & cytoxan infusion in 4 weeks (clinic collect labs from [facility]).    Future Appointments   Date Time Provider Department Center   6/28/2019 10:10 AM LAB, [facility] STA LAB [facility]   7/5/2019 10:10 AM LAB, [facility] LAB [facility]   7/9/2019  8:30 AM Wood Daley MD [facility]   7/18/2019  8:15 AM INJECTION, [facility]FUSION [facility]   7/18/2019  9:30 AM Yulissa Klein, MORAIMA [facility] BM [facility]   7/18/2019 10:30 AM [facility]   7/23/2019  8:30 AM GABRIEL Somers, CNS [facility] MSC [facility]           Solaraze Counseling:  I discussed with the patient the risks of Solaraze including but not limited to erythema, scaling, itching, weeping, crusting, and pain.

## 2022-09-21 ENCOUNTER — PATIENT OUTREACH (OUTPATIENT)
Dept: ADMINISTRATIVE | Facility: HOSPITAL | Age: 62
End: 2022-09-21
Payer: MEDICARE

## 2022-10-11 ENCOUNTER — PATIENT MESSAGE (OUTPATIENT)
Dept: ADMINISTRATIVE | Facility: HOSPITAL | Age: 62
End: 2022-10-11
Payer: MEDICARE

## 2022-10-13 ENCOUNTER — PATIENT OUTREACH (OUTPATIENT)
Dept: ADMINISTRATIVE | Facility: HOSPITAL | Age: 62
End: 2022-10-13
Payer: MEDICARE

## 2022-10-27 ENCOUNTER — PATIENT OUTREACH (OUTPATIENT)
Dept: ADMINISTRATIVE | Facility: HOSPITAL | Age: 62
End: 2022-10-27
Payer: MEDICARE

## 2022-11-15 ENCOUNTER — PATIENT OUTREACH (OUTPATIENT)
Dept: ADMINISTRATIVE | Facility: HOSPITAL | Age: 62
End: 2022-11-15
Payer: MEDICARE

## 2022-11-25 NOTE — PROGRESS NOTES
"This note was created by combination of typed  and M-Modal dictation.  Transcription errors may be present.  If there are any questions, please contact me.    Assessment & Plan:   Type 2 diabetes mellitus with diabetic polyneuropathy, with long-term current use of insulin  Controlled type 2 diabetes mellitus with diabetic nephropathy, with long-term current use of insulin  -for now no change, Basaglar 25, NovoLog 10 with meals, she give some additional sliding scale if needed for bigger meal.  I would like for goal a.m. blood sugar to be , do not want to have much hypoglycemia.  Future labs ordered for 3 months  -     insulin syringe-needle U-100 (INSULIN SYRINGE) 0.5 mL 30 gauge x 5/16" Syrg; Use 3x/day  Dispense: 300 each; Refill: 3  -     Comprehensive metabolic panel; Future; Expected date: 04/14/2020  -     Lipid panel; Future; Expected date: 04/14/2020  -     Hemoglobin A1c; Future; Expected date: 04/14/2020    Essential hypertension  -stable, refilled atenolol  -     atenolol (TENORMIN) 50 MG tablet; Take 1 tablet (50 mg total) by mouth once daily.  Dispense: 90 tablet; Refill: 3    Vascular dementia with behavior disturbance  Fatigue, unspecified type  Acquired immunocompromised state  CNS vasculitis failed imuran; on cytoxan  -notes overall improvement on the Rituxan.  No obvious side effects so far.  Has an upcoming follow-up with Rheumatology as well as Neurology after his 2nd infusion.  -will be running out of his Ritalin soon.  I have refilled that for this time.  Has been stable on this medication.  Normally this is filled by Neurology.  -     methylphenidate HCl (RITALIN) 10 MG tablet; Take 1 tablet (10 mg total) by mouth 2 (two) times daily with meals.  Dispense: 60 tablet; Refill: 0      MANISH (acute kidney injury)  -improved by time of discharge.    Other osteoporosis with current pathological fracture with routine healing, subsequent encounter  Closed compression fracture of fourth " "lumbar vertebra, sequela  -on bisphosphonate    Mild episode of recurrent major depressive disorder  -zoloft 150            Medications Discontinued During This Encounter   Medication Reason    atenolol (TENORMIN) 50 MG tablet Reorder    methylphenidate HCl (RITALIN) 10 MG tablet Reorder    insulin syringe-needle U-100 (INSULIN SYRINGE) 1/2 mL 30 gauge x 5/16 Syrg Reorder       meds sent this encounter:  Medications Ordered This Encounter   Medications    atenolol (TENORMIN) 50 MG tablet     Sig: Take 1 tablet (50 mg total) by mouth once daily.     Dispense:  90 tablet     Refill:  3    insulin syringe-needle U-100 (INSULIN SYRINGE) 0.5 mL 30 gauge x 5/16" Syrg     Sig: Use 3x/day     Dispense:  300 each     Refill:  3    methylphenidate HCl (RITALIN) 10 MG tablet     Sig: Take 1 tablet (10 mg total) by mouth 2 (two) times daily with meals.     Dispense:  60 tablet     Refill:  0       Follow Up: No follow-ups on file. OV 3 months, previsit labs ordered.    Subjective:     Chief Complaint   Patient presents with    Hospital Follow Up       HPI  Bessy is a 59 y.o. male, last appointment with this clinic was 12/19/2019.    Last visit with me in December  Diabetes Victoza, metformin, insulin, while hospitalized his dose requirements dropped significantly but since discharge I think his sugars were going to go up.  Basaglar 25 and NovoLog 10.  Plan was if increasing, then Basaglar 35.    ER visit January 3rd for fall.    Hospital admission January 6 through January 9th.  Initial presentation of headache.  But subacutely, for the past 2 weeks he was doing poorly with decreased oral intake and weight loss, generalized weakness and confusion.    Hospital Course:   Patient with history of CNS vasculitis and dementia admitted to [facility] with ongoing HA with concern for CNS vasculitis flair. On admission, ESR was 35 and patient was noted to have an MANISH with Cr of 2.5 (bl 1.1) and hypomagnasemic. Rheum and neurology was " consulted with recommendations for possible rituximab initiation as outpatient notes had previosly mentioned. Rheumatology noted that patient needed to be cleared of any active infection and MANISH prior to initiation. Concern for ongoing vasculitis flair or infection was limited by low CRP as well as lack of fever, chills, rigor, or leukocytosis. Magnesium was repleted and patient was initiated on continuous fluids with improvement of MANISH. Patient with Cr of 1.3 on discharge. After discussion with Rheumatology and Neurology, decision was made to start patient on Rituxan infusions outpatient. He is scheduled for his first infusion 01/10. Patient to have home health PT/OT on discharge with outpatient follow up with rheumatology.     Neuro appt 1/22    Cr 1.3 on discharge. Had been up to 2.5  Renal US stable cysts otherwise normal    Comes in today accompanied by his wife.  Patient is much more alert and the wife notes that he is much more alert 2.  Soon after discharge he got his 1st dose of Rituxan.  Plan is another dose of Rituxan and then wait 6 months.  No obvious issues with the Rituxan.  He did have to have coadministration with steroid which raised his blood sugars.  But she notes that he has been more alert, memory is better, he is dressing himself whereas before he was not, appetite is better.  Still needs to remind him to drink more.  Today he is conversant though not overly so, is aware of his upcoming appointments.    Wife has been administering his insulin, Basaglar 25, NovoLog 10 with meals and 1.8 of Victoza.  Her goal is to try get his blood sugars around 80s or 90s.  However given that he is not able to advocate for himself with hypoglycemia I would like to keep him  for morning blood sugars.  She does not think that he has any signs of hypoglycemia.  No diaphoresis, no altered mental status.    I have asked her to dial down her sliding scale.  She will give him sliding scale to try to achieve the  goal of 80 90 in the mornings.  So she may give him 5-10 units extra of NovoLog depending on the size of his meal.  I have asked her to dial it down by maybe 2 units.    On rituxan days - plan is for 40 units of basaglar and 20 of novolog. Day of and day after.     She notes he was aggressive during his hospitalization but she thinks that was because he was not getting his home dose of Zoloft.  He normally gets 150 and she thinks he was only getting 100.  Since discharge and being on the previous dose of Zoloft but also with the Rituxan infusion he is not aggressive.    Denies obvious SE of medication.     Patient Care Team:  Edgar Nova MD as PCP - General (Internal Medicine)  Promise Steele NP as Nurse Practitioner (Endocrinology)  Shana Love MD as Consulting Physician (Neurology)  Mima Pina MA as Care Coordinator    Patient Active Problem List    Diagnosis Date Noted    Fall 01/03/2020    Abrasion 01/03/2020    Sepsis due to urinary tract infection 12/09/2019    Severe sepsis 12/03/2019    Stress 11/04/2019    Diarrhea 07/18/2019    Anemia associated with chemotherapy 07/18/2019    Chemotherapy-induced thrombocytopenia 07/18/2019    MANISH (acute kidney injury) 06/21/2019    Screen for colon cancer 05/21/2019    Dementia with behavioral disturbance 04/30/2019    Osteoporosis with current pathological fracture with routine healing from steroids; compression fx 2017; 4/2019 bisphosphonate 04/10/2019    Duodenal ulcer 6/2017 EGD - duodenum and gastric ulcer s/p cautery 03/28/2019 6/2017 EGD showing duodenal and gastric ulcer which were treated with cautery.      Renal cyst bilateral simple and minimally, complicated renal cysts. 03/28/2019 1/7/2020 renal US: Two stable simple to minimally complex right renal cysts.     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    Central scotoma, right eye 02/06/2019    Encounter for chemotherapy management  12/07/2018    Encounter for long term current use of cyclophosphamide 10/30/2018    Need for Tdap vaccination 08/29/2018    Need for hepatitis A and B vaccination 08/29/2018    Need for pneumococcal vaccination 08/29/2018    Acquired immunocompromised state 08/29/2018     cyclophosphamide      Controlled type 2 diabetes mellitus with diabetic nephropathy, with long-term current use of insulin 08/01/2018    Chemotherapy induced nausea and vomiting 06/12/2018    Dysphasia     Aphasia 12/04/2017    Dysphonia 12/04/2017    Cognitive deficits 12/04/2017    Closed compression fracture of L4 lumbar vertebra on MRI 11/2017 11/15/2017     11/2017 MRI L spine: Acute burst type fracture of the L4 vertebra with fracture line extending to the posterior cortex of the L4 vertebra. There is loss of central vertebral body height of the superior end plate of approximately 40%.  IMO Regulatory Load October 2019      Adverse effect of glucocorticoids and synthetic analogues, initial encounter 09/15/2017    Lacunar stroke of left subthalamic region 09/14/2017 9/14/2017 MRI brain: 1. Findings compatible with a small acute lacunar infarct adjacent to the left frontal horn.  Nonspecific enhancement just inferior to this region and extending into the left basal ganglia, as well as within the inferior aspect of the left cerebellum.  No evidence of a focal mass.  2.  Extensive increased signal intensity involving the periventricular white matter compatible with demyelinating disease versus vasculitis.  3.  Clinical correlation and followup recommended.  4.  This reportedly flattened in the EPIC and the corpus callosum medical record system.  12/4/19 MRI brain with/without: Chronic ischemic change as further detailed above, similar to MRI of 04/15/2019.  Multiple small foci of prior hemorrhage in the cerebellum and jose, possible sequela from hypertension.  No evidence of recent infarction or other acute intracranial  pathology.      Episodic confusion      Improved now that back on cyclophosphamide      Cytopenia 08/30/2017    Impaired functional mobility, balance, gait, and endurance 06/14/2017    Urinary incontinence 06/04/2017    CNS vasculitis failed imuran; on cytoxan 06/02/2017     Failed Cytoxan hiatus and transition to Imuran Sept/Oct 2018; restarted on cytoxan  12/4/19 MRI brain with/without: Chronic ischemic change as further detailed above, similar to MRI of 04/15/2019.  Multiple small foci of prior hemorrhage in the cerebellum and jose, possible sequela from hypertension.  No evidence of recent infarction or other acute intracranial pathology.      Encephalopathy 05/26/2017    Type 2 diabetes mellitus with diabetic polyneuropathy, with long-term current use of insulin 05/14/2017    Amblyopia 05/13/2017    Tubular adenoma of colon 2014 C scope polyp repeat 3 years; 5/21/2019 colonscopy 8 ascending 4 descending polyps path 2 tubular adenomas, rest are inflammatory polyps; repeat 1 year 01/30/2015 2/21/2014 colonoscopy showing mild nonspecific acute and chronic inflammation of the ascending colon.  Hyperplastic polyp. Repeat 3 years.  5/21/2019 colonscopy 8 ascending 4 descending polyps path 2 tubular adenomas, rest are inflammatory polyps; repeat 1 year      Lipodystrophy 10/11/2013    NAFLD (nonalcoholic fatty liver disease) 6/12/2015 liver biopsy showing advanced stage fibrosis with bridging fibrosis and scattered nodules. 10/11/2013     6/12/2015 liver biopsy showing advanced stage fibrosis with bridging fibrosis and scattered nodules.      Obesity (BMI 30-39.9) 10/11/2013    ED (erectile dysfunction) 10/11/2013     JOHN with CPAP at night 10/11/2013    Episode of recurrent major depressive disorder 11/09/2012    Mixed hyperlipidemia 11/09/2012    Essential hypertension 5/2016 TTE diastolic dysfunction 11/09/2012 5/11/2016 TTE:   1 - Mildly depressed left ventricular systolic function (EF  177.8 45-50%).  Mild global hypokinesis at rest. 2 - Eccentric hypertrophy. 3 - Left ventricular diastolic dysfunction.         PAST MEDICAL HISTORY:  Past Medical History:   Diagnosis Date    Amblyopia     Cataract     CNS vasculitis 6/2/2017    Follows with Dr. Love By mri.  Several active lesions, many old. 5/13: MRA brain/neck, MRI brain w/wo contrast show no vascular occlusion, multiple foci of hyperintensity in deep cerebral periventricular white matter in pattern of demyelinating process.  5/17: MRI spine performed, no spinal cord lesions. Hospitalization 6/2017. EEG was performed negative for seizures.  Repeat MRI showing new lesion, question whether this was demyelination versus CVA. 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 Findings compatible with a small acute lacunar infarct adjacent to the left frontal horn.  Nonspecific enhancement just inferior to this region and extending into the left basal ganglia, as well as within the inferior aspect of the left cerebellum.  No evidence of a focal mass. 2.  Extensive increased signal intensity involving the periventricular white matter compatible with demyelinating disease versus vasculitis. 3.  Clinical correlation and followup recommended. 4.  This reportedly flattened in the EPIC and the corpus callosum medical record system.    Mixed hyperlipidemia 11/9/2012    NAFLD (nonalcoholic fatty liver disease) 10/11/2013    CHASE (nonalcoholic steatohepatitis)     Obesity     Stroke     Type 2 diabetes mellitus with diabetic polyneuropathy, with long-term current use of insulin 5/14/2017    Type 2 diabetes mellitus with hyperglycemia, with long-term current use of insulin 10/11/2013       PAST SURGICAL HISTORY:  Past Surgical History:   Procedure  Laterality Date    COLONOSCOPY N/A 5/21/2019    Procedure: COLONOSCOPY;  Surgeon: Alex Ascencio MD;  Location: [facility];  Service: Endoscopy;  Laterality: N/A;  confirmed appt-sp    INSERTION OF TUNNELED CENTRAL VENOUS CATHETER (CVC) WITH SUBCUTANEOUS PORT N/A 12/7/2018    Procedure: [id number];  Surgeon: Luan Diagnostic Provider;  Location: [facility] OR [address] [place] [address];  Service: Radiology;  Laterality: N/A;    SKIN CANCER EXCISION         SOCIAL HISTORY:  Social History     Socioeconomic History    Marital status:      Spouse name: Not on file    Number of children: Not on file    Years of education: Not on file    Highest education level: Not on file   Occupational History    Occupation: unemployed   Social Needs    Financial resource strain: Very hard    Food insecurity:     Worry: Never true     Inability: Never true    Transportation needs:     Medical: No     Non-medical: No   Tobacco Use    Smoking status: Never Smoker    Smokeless tobacco: Never Used   Substance and Sexual Activity    Alcohol use: No     Alcohol/week: 0.0 standard drinks     Frequency: Never     Drinks per session: 1 or 2     Binge frequency: Never    Drug use: No    Sexual activity: Yes     Partners: Female   Lifestyle    Physical activity:     Days per week: 0 days     Minutes per session: 0 min    Stress: Only a little   Relationships    Social connections:     Talks on phone: More than three times a week     Gets together: Once a week     Attends [employer] service: Not on file     Active member of club or organization: No     Attends meetings of clubs or organizations: Never     Relationship status:    Other Topics Concern    Not on file   Social History Narrative    Not on file       ALLERGIES AND MEDICATIONS: updated and reviewed.  Review of patient's allergies indicates:  No Known Allergies  Current Outpatient Medications   Medication Sig Dispense Refill    alendronate (FOSAMAX) 70 MG tablet Take  1 tablet (70 mg total) by mouth every 7 days. (Patient taking differently: Take 70 mg by mouth every 7 days. Tuesday) 4 tablet 11    aspirin (ECOTRIN) 81 MG EC tablet Take 81 mg by mouth once daily.      atenolol (TENORMIN) 50 MG tablet Take 1 tablet (50 mg total) by mouth once daily. 90 tablet 3    atorvastatin (LIPITOR) 40 MG tablet TAKE 1 TABLET BY MOUTH ONCE DAILY 90 tablet 3    blood sugar diagnostic (TRUE METRIX GLUCOSE TEST STRIP) Strp Test blood sugar four times a day 400 each 11    diltiaZEM (DILT-XR) 240 MG CDCR Take 1 capsule (240 mg total) by mouth once daily. 90 capsule 3    donepezil (ARICEPT) 5 MG tablet Take 1 tablet (5 mg total) by mouth every evening. 90 tablet 3    FREESTYLE ARELY 14 DAY READER Mis GLUCOSE TESTING : FASTING AND PRIOR TO MEALS 1 each 0    FREESTYLE ARELY 14 DAY SENSOR Kit GLUCOSE TESTING 4 TIMES A DAY 2 kit 11    insulin (BASAGLAR KWIKPEN U-100 INSULIN) glargine 100 units/mL (3mL) SubQ pen Inject 25 Units into the skin once daily. Up to 60 BID with cytoxan 2 Box 11    insulin aspart U-100 (NOVOLOG) 100 unit/mL (3 mL) InPn pen Inject 1-10 Units into the skin before meals and at bedtime as needed (Hyperglycemia). 3 mL 0    insulin syringe-needle U-100 (INSULIN SYRINGE) 1/2 mL 30 gauge x 5/16 Syrg Use 3x/day 300 each 3    irbesartan (AVAPRO) 300 MG tablet TAKE 1 TABLET BY MOUTH ONCE DAILY IN THE EVENING 90 tablet 3    lancets 30 gauge Misc Test blood sugar four times a day 100 each 11    liraglutide 0.6 mg/0.1 mL, 18 mg/3 mL, subq PNIJ (VICTOZA 3-TOMASZ) 0.6 mg/0.1 mL (18 mg/3 mL) PnIj Inject 1.8 mg into the skin once daily. 9 mL 11    metFORMIN (GLUCOPHAGE-XR) 500 MG 24 hr tablet Take 2 tablets (1,000 mg total) by mouth 2 (two) times daily with meals. 360 tablet 3    methylphenidate HCl (RITALIN) 10 MG tablet Take 1 tablet (10 mg total) by mouth 2 (two) times daily with meals. 60 tablet 0    omega-3 fatty acids-vitamin E (FISH OIL) 1,000 mg Cap Take 1 capsule by mouth  "2 (two) times daily.  0    pen needle, diabetic (BD ULTRA-FINE ANA PEN NEEDLE) 32 gauge x 5/32" Ndle 4x daily insulin pen needle, relion 400 each 3    QUEtiapine (SEROQUEL) 25 MG Tab Take 1 tablet (25 mg total) by mouth every evening. 30 tablet 11    sertraline (ZOLOFT) 100 MG tablet 1.5 tablet daily (Patient taking differently: Take 150 mg by mouth once daily. ) 135 tablet 3    vitamin D 1000 units Tab Take 5 tablets (5,000 Units total) by mouth once daily.       No current facility-administered medications for this visit.        Review of Systems   Constitutional: Negative for fever.   Respiratory: Negative for cough and shortness of breath.    Cardiovascular: Negative for chest pain and palpitations.   Neurological: Negative for headaches.       Objective:   Physical Exam   Vitals:    01/15/20 1411 01/15/20 1433   BP: (!) 142/88 130/80   BP Location: Right arm Left arm   Patient Position: Sitting Sitting   BP Method: Medium (Manual) Large (Manual)   Pulse: 84    Temp: 98.1 °F (36.7 °C)    TempSrc: Oral    SpO2: 98%    Weight: 93.9 kg (207 lb)    Height: 5' 11" (1.803 m)     Body mass index is 28.87 kg/m².  Weight: 93.9 kg (207 lb)   Height: 5' 11" (180.3 cm)     Physical Exam   Constitutional: He appears well-developed and well-nourished. No distress.   Eyes: EOM are normal.   Cardiovascular: Normal rate, regular rhythm and normal heart sounds.   No murmur heard.  Pulmonary/Chest: Effort normal and breath sounds normal.   Musculoskeletal: Normal range of motion. He exhibits no edema.   Neurological: He is alert. Coordination normal.   Skin: Skin is warm and dry.   Psychiatric: He has a normal mood and affect. His behavior is normal. Thought content normal.   Still with paucity of speech but overall more alert and conversant compared to previous  Normal hygiene     "

## 2022-12-28 ENCOUNTER — PATIENT MESSAGE (OUTPATIENT)
Dept: ADMINISTRATIVE | Facility: HOSPITAL | Age: 62
End: 2022-12-28
Payer: MEDICARE

## 2022-12-28 ENCOUNTER — PATIENT OUTREACH (OUTPATIENT)
Dept: ADMINISTRATIVE | Facility: HOSPITAL | Age: 62
End: 2022-12-28
Payer: MEDICARE

## 2023-01-09 ENCOUNTER — PATIENT MESSAGE (OUTPATIENT)
Dept: ADMINISTRATIVE | Facility: HOSPITAL | Age: 63
End: 2023-01-09
Payer: MEDICARE

## 2023-03-15 DIAGNOSIS — E11.9 TYPE 2 DIABETES MELLITUS WITHOUT COMPLICATION: ICD-10-CM

## 2023-04-12 ENCOUNTER — PATIENT MESSAGE (OUTPATIENT)
Dept: ADMINISTRATIVE | Facility: HOSPITAL | Age: 63
End: 2023-04-12
Payer: MEDICARE

## 2023-04-21 NOTE — ED NOTES
[place] Suicide Severity Rating Scale  1. Have you wished you were dead or wished you could go to sleep and not wake up? (past month): No (04/21/23 1302)  2. Have you actually had any thoughts of killing yourself? (past month): No (04/21/23 1302)  6. Have you ever done anything, started to do anything, or prepared to do anything to end your life? (lifetime): No (04/21/23 1302)  Suicide Evaluation: Negative Screen - White (04/21/23 1302)     Recent PHQ 2/9 Score    PHQ 2:  Date Adult PHQ 2 Score Adult PHQ 2 Interpretation   4/21/2023 0 No further screening needed       PHQ 9:     PHQ-2/9 Depression Screening  Little interest or pleasure in activity?: Not at all  Feeling down, depressed or hopeless?: Not at all  Initial depression screening score:: 0  PHQ2 Interpretation: No further screening needed     Health Maintenance Due   Topic Date Due   • Pneumococcal Vaccine 0-64 (1 - PCV) Never done   • Colorectal Cancer Screen-  Never done   • Shingles Vaccine (1 of 2) Never done   • COVID-19 Vaccine (3 - Booster for Pfizer series) 10/24/2021       Patient is due for topics as listed above but is not proceeding with Immunization(s) COVID-19, Pneumococcal and Shingles and Colorectal Cancer Screening: Colonoscopy, iFOBT, Cologuard and Sigmoidoscopy at this time.      Care assumed of pt, agree with assessment of previous nurse. Continuous visual contact continues as before. Pt remains calm and cooperative. Instructed to call for needs and voiced understanding. Denies any pain. Does not need to use the bathroom at this time.

## 2023-04-28 NOTE — TELEPHONE ENCOUNTER
Received call from Jennifer Woodward in the [place] lab notifying us that pt's WBC is 1.08.    Opioid Counseling: I discussed with the patient the potential side effects of opioids including but not limited to addiction, altered mental status, and depression. I stressed avoiding alcohol, benzodiazepines, muscle relaxants and sleep aids unless specifically okayed by a physician. The patient verbalized understanding of the proper use and possible adverse effects of opioids. All of the patient's questions and concerns were addressed. They were instructed to flush the remaining pills down the toilet if they did not need them for pain.

## 2023-05-17 DIAGNOSIS — E11.9 TYPE 2 DIABETES MELLITUS WITHOUT COMPLICATION: ICD-10-CM

## 2023-05-22 ENCOUNTER — PATIENT MESSAGE (OUTPATIENT)
Dept: ADMINISTRATIVE | Facility: HOSPITAL | Age: 63
End: 2023-05-22
Payer: MEDICARE

## 2023-06-18 ENCOUNTER — PATIENT MESSAGE (OUTPATIENT)
Dept: ADMINISTRATIVE | Facility: HOSPITAL | Age: 63
End: 2023-06-18
Payer: MEDICARE

## 2023-06-28 DIAGNOSIS — N18.31 STAGE 3A CHRONIC KIDNEY DISEASE: ICD-10-CM

## 2023-08-04 NOTE — ASSESSMENT & PLAN NOTE
sCr on admission 2.3. Baseline is around 1.1. It is likely pre-renal in etiology in setting of decreased PO intake, low BP, tachycardia, dry on exam. Also on differential is post-obstructive and intrarenal.    Plan:  -LR fluid challenge  -Daily BMP  -Avoid nephrotoxic meds and renally dose all meds  -Holding home ARB  -Strict I/Os and daily standing weights   -Can consider urine lytes and/or US retroperitoneal should patient have no response to fluid administration     Bilobed Transposition Flap Text: Because of the orientation and full-thickness nature of the defect, and in order to avoid distortion of the surrounding structures, a bilobed flap was planned.  After prep and local anesthesia, the flap was then outlined, incised and elevated.  The skin was widely undermined to allow for closure of the donor site defect.  After hemostasis obtained, the flaps were transposed into place and sutured in a layered fashion.

## 2025-04-16 NOTE — ASSESSMENT & PLAN NOTE
The following individual(s) verbally consented to be recorded using ambient AI listening technology and understand that they can each withdraw their consent to this listening technology at any point by asking the clinician to turn off or pause the recording:    Patient name: Jorge RUIZ  Additional names:       HPI:   Jorge RUIZ is a 54 year old female who presents for follow up     History of Present Illness  Jorge RUIZ is a 54-year-old female who presents with urinary urgency and dysuria.    She has been experiencing urinary urgency and dysuria for the past week. Similar symptoms occurred in September and December of the previous year while she was in [facility], for which she was prescribed Cefalexin. She completed the full course of antibiotics on both occasions, and her symptoms resolved. Her symptoms often occur after long journeys, such as a recent three-hour plane ride from the \[facility] [place] [facility]". She initially experienced chills when she felt the pain but denies any current back pain or fever. She has had three episodes of similar symptoms in the last twelve months, including the current one.    No back pain, flank pain, abdominal pain, nausea, vomiting, fever, vaginal discharge, or foul-smelling urine. She has been drinking a lot of water and took Ezo twice on the day of the visit.          Current Outpatient Medications   Medication Sig Dispense Refill    phenazopyridine HCl 95 MG Oral Tab Take 1 tablet (95 mg total) by mouth 3 (three) times daily as needed for Pain.      levothyroxine 75 MCG Oral Tab Take 1 tablet (75 mcg total) by mouth before breakfast.      Cholecalciferol (VITAMIN D3) 75 MCG (3000 UT) Oral Tab Take by mouth.      levothyroxine 50 MCG Oral Tab Take 1 tablet (50 mcg total) by mouth before breakfast. 60 tablet 0    Vitamin B-12 500 MCG Oral Tab Take 500 mcg by mouth daily.      CALCIUM OR Take  by mouth.      cyanocobalamin 1000 MCG Oral Tab Take 1 tablet (1,000 mcg  60 y/o male with a medical history of CNS vasculitis (diagnosed in 2017 via angiogram-established patient of Dr. Durant and the multiple sclerosis clinic),  CHASE cirrhosis, DMII (A1c 7.4), JOHN on CPAP, and underlying dementia (established patient of Dr. Cross) consulted by hospital medicine for treatment of possible vasculitis flare and initiation of rituxan. Patient had initially failed cytoxan hiatus and transitioned to imuran in september 2018. He was then restarted on cytoxan in October 2018 which he was tolerating well. 12/4/19 MRI brain with/without showing chronic ischemic change, similar to MRI of 04/15/2019. Patient initially presented to the MS clinic yesterday due to a 8/10 headache which has resolved with the use of Advil/Aleve. Wife has mentioned that since October 2019 (when he was admitted to the hospital for sepsis 2/2 UTI), he has lost weight (10-15 lbs) and has cognitive decline. Please note that due to the recent hospital admission he was not able to receive his dose of cytoxan. Decision was made to switch him to rituxan. Neurology and rheumatology were consulted with admission so that he can get Rituxan as inpatient to avoid further delays with outpatient scheduling (if medically cleared). However due to his worsening kidney function would hold the treatment.     Plan   1) Hold rituxan therapy until patient's kidney function clears.   2) Hold steroids     total) by mouth daily.        Past Medical History:    HYPOTHYROIDISM      Past Surgical History:   Procedure Laterality Date          Female          Male      Family History   Problem Relation Age of Onset    Hypertension Father     Diabetes Father     Kidney Disease Father         kidney stones    Hypertension Mother     Diabetes Mother         borderline    Arthritis Mother         rheumotoid/ osteoarthritis    Heart Attack Paternal Grandfather       Social History:   Social History     Socioeconomic History    Marital status:    Tobacco Use    Smoking status: Never    Smokeless tobacco: Never   Vaping Use    Vaping status: Never Used   Substance and Sexual Activity    Alcohol use: No    Drug use: No    Sexual activity: Yes     Partners: Male     Occ: . : . Children: 2  Homemaker .   Exercise:  twice per week.  Diet: vegetarian     REVIEW OF SYSTEMS:   GENERAL: feels well otherwise  SKIN: denies any unusual skin lesions  EYES:denies blurred vision or double vision  HEENT: denies nasal congestion, sinus pain or ST  LUNGS: denies shortness of breath with exertion  CARDIOVASCULAR: denies chest pain on exertion  GI: denies abdominal pain,denies heartburn  : denies dysuria, vaginal discharge or itching  MUSCULOSKELETAL: denies back pain  NEURO: denies headaches  PSYCHE: denies depression or anxiety  HEMATOLOGIC: denies hx of anemia  ENDOCRINE: denies thyroid history  ALL/ASTHMA: denies hx of allergy or asthma    EXAM:   /90   Pulse 73   Resp 16   Ht 5' 3.5\" (1.613 m)   Wt 132 lb 3.2 oz (60 kg)   LMP 2016   SpO2 100%   BMI 23.05 kg/m²   Body mass index is 23.05 kg/m².   GENERAL: alert and oriented X 3, well developed, well nourished,in no apparent distress  NEURO: cranial nerves are intact,motor and sensory are grossly intact  HEART: normal  LUNGS: clear  NECK: supple no LAD  ABD: no CVA tenderness, + BS soft NT ND no RRG    ASSESSMENT AND PLAN:   Jorge  SARA is a 54 year old female who presents with     Assessment & Plan  Recurrent Urinary Tract Infection (UTI)  She presents with urgency and dysuria for the past week, consistent with a UTI. Similar episodes occurred in September and December, resolving with Cefalexin. The current episode began post-flight, with no back pain, flank pain, abdominal pain, nausea, vomiting, or fever. Urinalysis shows blood, white blood cells, and nitrates, indicating a probable UTI. Discussed perimenopausal changes, hydration, avoiding constipation, and voiding before and after intercourse. Potential future use of estrogen cream was mentioned. A different antibiotic than Cefalexin is prescribed to prevent resistance and ensure efficacy.  - Send urine culture to confirm bacterial growth and antibiotic sensitivity  - Prescribe a different antibiotic than Cefalexin  - Encourage hydration and regular voiding  - Advise on dietary measures such as consuming yogurt or taking probiotics during antibiotic treatment  - Discuss lifestyle modifications to prevent future UTIs, including hydration, avoiding constipation, and voiding before and after intercourse    Hypertension  Blood pressure was slightly elevated during the visit. Plan to repeat the measurement to confirm.  - Repeat blood pressure measurement      1. Dysuria    - Urine Dip, auto without Micro  - Urine Culture, Routine [E]; Future  - nitrofurantoin monohydrate macro (MACROBID) 100 MG Oral Cap; Take 1 capsule (100 mg total) by mouth 2 (two) times daily for 7 days.  Dispense: 14 capsule; Refill: 0  - Urine Culture, Routine [E]        Questions answered and patient indicates understanding of these issues and agrees to the plan.  Follow up in 3mo or sooner if needed.

## (undated) DEVICE — GOWN SURG 2XL DISP TIE BACK

## (undated) DEVICE — TRAY MINOR GEN SURG

## (undated) DEVICE — SYR DISP LL 5CC

## (undated) DEVICE — BLADE SURG CARBON STEEL SZ11

## (undated) DEVICE — CUP MEDICINE STERILE 2OZ

## (undated) DEVICE — SUT 3-0 12-18IN SILK

## (undated) DEVICE — SYR ONLY LUER LOCK 20CC

## (undated) DEVICE — ELECTRODE REM PLYHSV RETURN 9

## (undated) DEVICE — SEE MEDLINE ITEM 157131

## (undated) DEVICE — SUT PROLENE 0 MO6 30IN BLUE

## (undated) DEVICE — SEE MEDLINE ITEM 157117

## (undated) DEVICE — SET DECANTER MEDICHOICE

## (undated) DEVICE — DRAPE C ARM 42 X 120 10/BX

## (undated) DEVICE — SOL NACL 0.9% INJ PF/50151

## (undated) DEVICE — DRAPE THYROID WITH ARMBOARD

## (undated) DEVICE — SUT MCRYL PLUS 4-0 PS2 27IN

## (undated) DEVICE — SUT VICRYL 3-0 27 SH